# Patient Record
Sex: FEMALE | Race: WHITE | NOT HISPANIC OR LATINO | Employment: OTHER | URBAN - METROPOLITAN AREA
[De-identification: names, ages, dates, MRNs, and addresses within clinical notes are randomized per-mention and may not be internally consistent; named-entity substitution may affect disease eponyms.]

---

## 2017-01-04 ENCOUNTER — LAB VISIT (OUTPATIENT)
Dept: LAB | Facility: HOSPITAL | Age: 77
End: 2017-01-04
Attending: FAMILY MEDICINE
Payer: MEDICARE

## 2017-01-04 DIAGNOSIS — E11.69 HYPERLIPIDEMIA ASSOCIATED WITH TYPE 2 DIABETES MELLITUS: ICD-10-CM

## 2017-01-04 DIAGNOSIS — E55.9 VITAMIN D DEFICIENCY: ICD-10-CM

## 2017-01-04 DIAGNOSIS — E11.9 TYPE 2 DIABETES MELLITUS WITHOUT COMPLICATION, WITHOUT LONG-TERM CURRENT USE OF INSULIN: ICD-10-CM

## 2017-01-04 DIAGNOSIS — E78.5 HYPERLIPIDEMIA ASSOCIATED WITH TYPE 2 DIABETES MELLITUS: ICD-10-CM

## 2017-01-04 LAB
25(OH)D3+25(OH)D2 SERPL-MCNC: 54 NG/ML
ALBUMIN SERPL BCP-MCNC: 3.9 G/DL
ALP SERPL-CCNC: 39 U/L
ALT SERPL W/O P-5'-P-CCNC: 14 U/L
ANION GAP SERPL CALC-SCNC: 11 MMOL/L
AST SERPL-CCNC: 17 U/L
BILIRUB SERPL-MCNC: 0.5 MG/DL
BUN SERPL-MCNC: 18 MG/DL
CALCIUM SERPL-MCNC: 9.9 MG/DL
CHLORIDE SERPL-SCNC: 103 MMOL/L
CHOLEST/HDLC SERPL: 3.8 {RATIO}
CO2 SERPL-SCNC: 28 MMOL/L
CREAT SERPL-MCNC: 0.9 MG/DL
EST. GFR  (AFRICAN AMERICAN): >60 ML/MIN/1.73 M^2
EST. GFR  (NON AFRICAN AMERICAN): >60 ML/MIN/1.73 M^2
GLUCOSE SERPL-MCNC: 105 MG/DL
HDL/CHOLESTEROL RATIO: 26.2 %
HDLC SERPL-MCNC: 191 MG/DL
HDLC SERPL-MCNC: 50 MG/DL
LDLC SERPL CALC-MCNC: 118 MG/DL
NONHDLC SERPL-MCNC: 141 MG/DL
POTASSIUM SERPL-SCNC: 3.7 MMOL/L
PROT SERPL-MCNC: 7.1 G/DL
SODIUM SERPL-SCNC: 142 MMOL/L
TRIGL SERPL-MCNC: 115 MG/DL

## 2017-01-04 PROCEDURE — 80061 LIPID PANEL: CPT

## 2017-01-04 PROCEDURE — 36415 COLL VENOUS BLD VENIPUNCTURE: CPT | Mod: PO

## 2017-01-04 PROCEDURE — 83036 HEMOGLOBIN GLYCOSYLATED A1C: CPT

## 2017-01-04 PROCEDURE — 82306 VITAMIN D 25 HYDROXY: CPT

## 2017-01-04 PROCEDURE — 80053 COMPREHEN METABOLIC PANEL: CPT

## 2017-01-05 LAB
ESTIMATED AVG GLUCOSE: 128 MG/DL
HBA1C MFR BLD HPLC: 6.1 %

## 2017-01-09 ENCOUNTER — DOCUMENTATION ONLY (OUTPATIENT)
Dept: FAMILY MEDICINE | Facility: CLINIC | Age: 77
End: 2017-01-09

## 2017-01-09 NOTE — PROGRESS NOTES
Pre-Visit Chart Review  For Appointment Scheduled on 1-10-17    Health Maintenance Due   Topic Date Due    Eye Exam  06/24/2016    Pneumococcal (65+) (2 of 2 - PPSV23) 10/20/2016

## 2017-01-10 ENCOUNTER — TELEPHONE (OUTPATIENT)
Dept: VASCULAR SURGERY | Facility: CLINIC | Age: 77
End: 2017-01-10

## 2017-01-10 ENCOUNTER — OFFICE VISIT (OUTPATIENT)
Dept: FAMILY MEDICINE | Facility: CLINIC | Age: 77
End: 2017-01-10
Payer: MEDICARE

## 2017-01-10 VITALS
SYSTOLIC BLOOD PRESSURE: 136 MMHG | TEMPERATURE: 98 F | HEIGHT: 63 IN | DIASTOLIC BLOOD PRESSURE: 72 MMHG | BODY MASS INDEX: 27.31 KG/M2 | HEART RATE: 92 BPM | WEIGHT: 154.13 LBS

## 2017-01-10 DIAGNOSIS — E55.9 VITAMIN D DEFICIENCY DISEASE: ICD-10-CM

## 2017-01-10 DIAGNOSIS — E78.5 HYPERLIPIDEMIA, UNSPECIFIED HYPERLIPIDEMIA TYPE: ICD-10-CM

## 2017-01-10 DIAGNOSIS — E78.5 OTHER AND UNSPECIFIED HYPERLIPIDEMIA: ICD-10-CM

## 2017-01-10 DIAGNOSIS — I87.2 CHRONIC VENOUS INSUFFICIENCY: ICD-10-CM

## 2017-01-10 DIAGNOSIS — I10 ESSENTIAL HYPERTENSION: Primary | ICD-10-CM

## 2017-01-10 DIAGNOSIS — R60.0 LOCALIZED EDEMA: Primary | ICD-10-CM

## 2017-01-10 DIAGNOSIS — E11.9 CONTROLLED TYPE 2 DIABETES MELLITUS WITHOUT COMPLICATION, WITHOUT LONG-TERM CURRENT USE OF INSULIN: ICD-10-CM

## 2017-01-10 PROCEDURE — 3044F HG A1C LEVEL LT 7.0%: CPT | Mod: S$GLB,,, | Performed by: FAMILY MEDICINE

## 2017-01-10 PROCEDURE — 1159F MED LIST DOCD IN RCRD: CPT | Mod: S$GLB,,, | Performed by: FAMILY MEDICINE

## 2017-01-10 PROCEDURE — 1157F ADVNC CARE PLAN IN RCRD: CPT | Mod: S$GLB,,, | Performed by: FAMILY MEDICINE

## 2017-01-10 PROCEDURE — 3078F DIAST BP <80 MM HG: CPT | Mod: S$GLB,,, | Performed by: FAMILY MEDICINE

## 2017-01-10 PROCEDURE — 3075F SYST BP GE 130 - 139MM HG: CPT | Mod: S$GLB,,, | Performed by: FAMILY MEDICINE

## 2017-01-10 PROCEDURE — 1125F AMNT PAIN NOTED PAIN PRSNT: CPT | Mod: S$GLB,,, | Performed by: FAMILY MEDICINE

## 2017-01-10 PROCEDURE — 1160F RVW MEDS BY RX/DR IN RCRD: CPT | Mod: S$GLB,,, | Performed by: FAMILY MEDICINE

## 2017-01-10 PROCEDURE — 99499 UNLISTED E&M SERVICE: CPT | Mod: S$GLB,,, | Performed by: FAMILY MEDICINE

## 2017-01-10 PROCEDURE — 2022F DILAT RTA XM EVC RTNOPTHY: CPT | Mod: S$GLB,,, | Performed by: FAMILY MEDICINE

## 2017-01-10 PROCEDURE — 4010F ACE/ARB THERAPY RXD/TAKEN: CPT | Mod: S$GLB,,, | Performed by: FAMILY MEDICINE

## 2017-01-10 PROCEDURE — 99999 PR PBB SHADOW E&M-EST. PATIENT-LVL III: CPT | Mod: PBBFAC,,, | Performed by: FAMILY MEDICINE

## 2017-01-10 PROCEDURE — 99214 OFFICE O/P EST MOD 30 MIN: CPT | Mod: S$GLB,,, | Performed by: FAMILY MEDICINE

## 2017-01-10 RX ORDER — SIMVASTATIN 40 MG/1
40 TABLET, FILM COATED ORAL NIGHTLY
Qty: 90 TABLET | Refills: 3 | Status: SHIPPED | OUTPATIENT
Start: 2017-01-10 | End: 2018-01-12 | Stop reason: SDUPTHER

## 2017-01-10 NOTE — PATIENT INSTRUCTIONS
Controlling High Blood Pressure  High blood pressure (hypertension) is often called the silent killer. This is because many people who have it dont know it. High blood pressure is defined as 140/90 mm Hg or higher. Know your blood pressure and remember to check it regularly. Doing so can save your life. Here are some things you can do to help control your blood pressure.    Choose heart-healthy foods  · Select low-salt, low-fat foods. Limit sodium intake to 2,400 mg per day or the amount suggested by your healthcare provider.  · Limit canned, dried, cured, packaged, and fast foods. These can contain a lot of salt.  · Eat 8 to 10 servings of fruits and vegetables every day.  · Choose lean meats, fish, or chicken.  · Eat whole-grain pasta, brown rice, and beans.  · Eat 2 to 3 servings of low-fat or fat-free dairy products.  · Ask your doctor about the DASH eating plan. This plan helps reduce blood pressure.  · When you go to a restaurant, ask that your meal be prepared with no added salt.  Maintain a healthy weight  · Ask your healthcare provider how many calories to eat a day. Then stick to that number.  · Ask your healthcare provider what weight range is healthiest for you. If you are overweight, a weight loss of only 3% to 5% of your body weight can help lower blood pressure. Generally, a good weight loss goal is to lose 10% of your body weight in a year.  · Limit snacks and sweets.  · Get regular exercise.  Get up and get active  · Choose activities you enjoy. Find ones you can do with friends or family. This includes bicycling, dancing, walking, and jogging.  · Park farther away from building entrances.  · Use stairs instead of the elevator.  · When you can, walk or bike instead of driving.  · [place] leaves, garden, or do household repairs.  · Be active at a moderate to vigorous level of physical activity for at least 40 minutes for a minimum of 3 to 4 days a week.   Manage stress  · Make time to relax and enjoy  life. Find time to laugh.  · Communicate your concerns with your loved ones and your healthcare provider.  · Visit with family and friends, and keep up with hobbies.  Limit alcohol and quit smoking  · Men should have no more than 2 drinks per day.  · Women should have no more than 1 drink per day.  · Talk with your healthcare provider about quitting smoking. Smoking significantly increases your risk for heart disease and stroke. Ask your healthcare provider about community smoking cessation programs and other options.  Medicines  If lifestyle changes arent enough, your healthcare provider may prescribe high blood pressure medicine. Take all medicines as prescribed. If you have any questions about your medicines, ask your healthcare provider before stopping or changing them.   © 2980-2845 [employer]. [address] [place] [address], [place] [address]. All rights reserved. This information is not intended as a substitute for professional medical care. Always follow your healthcare professional's instructions.

## 2017-01-10 NOTE — TELEPHONE ENCOUNTER
----- Message from Yun Valentin sent at 1/10/2017 11:41 AM CST -----  Pt was seen today by dr guardado and needs an appt for Chronic venous insufficiency  Please call her @ [phone].  Thanks !

## 2017-01-10 NOTE — MR AVS SNAPSHOT
[facility]  [address]  [place] [address]  Phone: [phone]  Fax: [phone]                  Alexa Otero   1/10/2017 11:20 AM   Office Visit    Description:  Female : 1940   Provider:  Idalia Greco MD   Department:  [place] [facility]           Reason for Visit     Follow-up           Diagnoses this Visit        Comments    Essential hypertension    -  Primary     Controlled type 2 diabetes mellitus without complication, without long-term current use of insulin         Other and unspecified hyperlipidemia         Hyperlipidemia, unspecified hyperlipidemia type         Vitamin D deficiency disease         Chronic venous insufficiency                To Do List           Future Appointments        Provider Department Dept Phone    2017 7:45 AM [facility] [phone]    2017 8:00 AM [facility] [phone]    2017 11:20 AM Idalia Greco MD [facility] [phone]      Goals (5 Years of Data)     None      Follow-Up and Disposition     Return in about 6 months (around 7/10/2017).       These Medications        Disp Refills Start End    simvastatin (ZOCOR) 40 MG tablet 90 tablet 3 1/10/2017     Take 1 tablet (40 mg total) by mouth every evening. - Oral    Pharmacy: [facility] - [place] [id number] [facility] Ph #: [phone]         [facility] On Call     [facility] On Call Nurse Care Line -  Assistance  Registered nurses in the [facility] On Call Center provide clinical advisement, health education, appointment booking, and other advisory services.  Call for this free service at [phone].             Medications           Message regarding Medications     Verify the changes and/or additions to your medication regime listed below are the same as discussed with your clinician today.  If any of these changes or additions are incorrect, please  "notify your healthcare provider.             Verify that the below list of medications is an accurate representation of the medications you are currently taking.  If none reported, the list may be blank. If incorrect, please contact your healthcare provider. Carry this list with you in case of emergency.           Current Medications     benazepril (LOTENSIN) 40 MG tablet Take 1 tablet (40 mg total) by mouth once daily.    biotin 1 mg tablet Take 1,000 mcg by mouth 3 (three) times daily.    hydrochlorothiazide (HYDRODIURIL) 25 MG tablet Take 1 tablet (25 mg total) by mouth once daily.    metformin (GLUCOPHAGE) 500 MG tablet TAKE 1 TABLET TWICE DAILY WITH MEALS    omeprazole (PRILOSEC) 40 MG capsule Take 1 capsule (40 mg total) by mouth once daily.    ranitidine (ZANTAC) 300 MG tablet     simvastatin (ZOCOR) 40 MG tablet Take 1 tablet (40 mg total) by mouth every evening.    VIT A/VIT C/VIT E/ZINC/COPPER (ICAPS AREDS ORAL) Take by mouth.           Clinical Reference Information           Vital Signs - Last Recorded  Most recent update: 1/10/2017 11:22 AM by Kristin Alan MA    BP Pulse Temp Ht Wt BMI    136/72 (BP Location: Right arm, Patient Position: Sitting, BP Method: Manual) 92 98 °F (36.7 °C) (Oral) 5' 3" (1.6 m) 69.9 kg (154 lb 1.6 oz) 27.3 kg/m2      Blood Pressure          Most Recent Value    BP  136/72      Allergies as of 1/10/2017     Flagyl [Metronidazole Hcl]      Immunizations Administered on Date of Encounter - 1/10/2017     None      Orders Placed During Today's Visit      Normal Orders This Visit    Ambulatory referral to Vascular Surgery     Future Labs/Procedures Expected by Expires    Comprehensive metabolic panel  7/10/2017 (Approximate) 1/10/2018    Hemoglobin A1c  7/10/2017 (Approximate) 1/10/2018    Lipid panel  7/10/2017 (Approximate) 1/10/2018    Microalbumin/creatinine urine ratio  7/10/2017 (Approximate) 1/10/2018    Vitamin D  7/10/2017 (Approximate) 1/10/2018      Instructions  "     Controlling High Blood Pressure  High blood pressure (hypertension) is often called the silent killer. This is because many people who have it dont know it. High blood pressure is defined as 140/90 mm Hg or higher. Know your blood pressure and remember to check it regularly. Doing so can save your life. Here are some things you can do to help control your blood pressure.    Choose heart-healthy foods  · Select low-salt, low-fat foods. Limit sodium intake to 2,400 mg per day or the amount suggested by your healthcare provider.  · Limit canned, dried, cured, packaged, and fast foods. These can contain a lot of salt.  · Eat 8 to 10 servings of fruits and vegetables every day.  · Choose lean meats, fish, or chicken.  · Eat whole-grain pasta, brown rice, and beans.  · Eat 2 to 3 servings of low-fat or fat-free dairy products.  · Ask your doctor about the DASH eating plan. This plan helps reduce blood pressure.  · When you go to a restaurant, ask that your meal be prepared with no added salt.  Maintain a healthy weight  · Ask your healthcare provider how many calories to eat a day. Then stick to that number.  · Ask your healthcare provider what weight range is healthiest for you. If you are overweight, a weight loss of only 3% to 5% of your body weight can help lower blood pressure. Generally, a good weight loss goal is to lose 10% of your body weight in a year.  · Limit snacks and sweets.  · Get regular exercise.  Get up and get active  · Choose activities you enjoy. Find ones you can do with friends or family. This includes bicycling, dancing, walking, and jogging.  · Park farther away from building entrances.  · Use stairs instead of the elevator.  · When you can, walk or bike instead of driving.  · [place] leaves, garden, or do household repairs.  · Be active at a moderate to vigorous level of physical activity for at least 40 minutes for a minimum of 3 to 4 days a week.   Manage stress  · Make time to relax and  enjoy life. Find time to laugh.  · Communicate your concerns with your loved ones and your healthcare provider.  · Visit with family and friends, and keep up with hobbies.  Limit alcohol and quit smoking  · Men should have no more than 2 drinks per day.  · Women should have no more than 1 drink per day.  · Talk with your healthcare provider about quitting smoking. Smoking significantly increases your risk for heart disease and stroke. Ask your healthcare provider about community smoking cessation programs and other options.  Medicines  If lifestyle changes arent enough, your healthcare provider may prescribe high blood pressure medicine. Take all medicines as prescribed. If you have any questions about your medicines, ask your healthcare provider before stopping or changing them.   © 9609-9895 [employer]. [address] [place] [address], [place] [address]. All rights reserved. This information is not intended as a substitute for professional medical care. Always follow your healthcare professional's instructions.

## 2017-01-13 ENCOUNTER — HOSPITAL ENCOUNTER (OUTPATIENT)
Dept: RADIOLOGY | Facility: HOSPITAL | Age: 77
Discharge: HOME | End: 2017-01-13
Attending: THORACIC SURGERY (CARDIOTHORACIC VASCULAR SURGERY)
Payer: MEDICARE

## 2017-01-13 DIAGNOSIS — R60.9 EDEMA, UNSPECIFIED TYPE: ICD-10-CM

## 2017-01-13 PROCEDURE — 93970 EXTREMITY STUDY: CPT | Mod: TC

## 2017-01-13 PROCEDURE — 93970 EXTREMITY STUDY: CPT | Mod: 26,,, | Performed by: RADIOLOGY

## 2017-01-23 ENCOUNTER — TELEPHONE (OUTPATIENT)
Dept: FAMILY MEDICINE | Facility: CLINIC | Age: 77
End: 2017-01-23

## 2017-01-23 NOTE — TELEPHONE ENCOUNTER
Spoke with Dr. Diehl's office they will fax over patients most recent eye exam. Fax number given.

## 2017-01-23 NOTE — PROGRESS NOTES
Subjective:       Patient ID: Alexa Otero is a 76 y.o. female.    Chief Complaint: Follow-up    Patient Active Problem List   Diagnosis    Sciatica    Syncope and collapse    Polycythemia, secondary    Hyperlipemia    Diabetes mellitus type 2, controlled    HTN (hypertension)    Acute chest pain    Vitamin D deficiency disease    Gastroesophageal reflux disease    History of Kwon's esophagus    Pancreatic pseudocyst/cyst    Hepatic cyst    Low back pain radiating to both legs     Reviewed MRI showing multiple old benign findings in abd  HPI  Review of Systems   Constitutional: Negative for fatigue and unexpected weight change.   Respiratory: Negative for chest tightness and shortness of breath.    Cardiovascular: Negative for chest pain, palpitations and leg swelling.   Gastrointestinal: Negative for abdominal pain.   Endocrine: Negative for polydipsia, polyphagia and polyuria.   Musculoskeletal: Negative for arthralgias.   Neurological: Negative for dizziness, syncope, light-headedness and headaches.       Objective:      Physical Exam   Constitutional: She is oriented to person, place, and time. She appears well-developed and well-nourished.   Cardiovascular: Normal rate, regular rhythm and normal heart sounds.    Pulmonary/Chest: Effort normal and breath sounds normal.   Musculoskeletal: She exhibits no edema.   Neurological: She is alert and oriented to person, place, and time.   Skin: Skin is warm and dry.   Psychiatric: She has a normal mood and affect.   Nursing note and vitals reviewed.      Assessment:       1. Essential hypertension    2. Controlled type 2 diabetes mellitus without complication, without long-term current use of insulin    3. Other and unspecified hyperlipidemia    4. Hyperlipidemia, unspecified hyperlipidemia type    5. Vitamin D deficiency disease    6. Chronic venous insufficiency        Plan:       1. Essential hypertension  Controlled on current medications.   Continue current medications.      2. Controlled type 2 diabetes mellitus without complication, without long-term current use of insulin  Stable condition.  Continue current medications.  Will adjust based on lab findings or if condition changes.    - Microalbumin/creatinine urine ratio; Future  - Hemoglobin A1c; Future  - Comprehensive metabolic panel; Future    3. Other and unspecified hyperlipidemia  Controlled on current medications.  Continue current medications.      4. Hyperlipidemia, unspecified hyperlipidemia type  Stable condition.  Continue current medications.  Will adjust based on lab findings or if condition changes.    - Lipid panel; Future    5. Vitamin D deficiency disease  Stable condition.  Continue current medications.  Will adjust based on lab findings or if condition changes.    - Vitamin D; Future    6. Chronic venous insufficiency  Refer for eval and treatment    - Ambulatory referral to Vascular Surgery    [facility] goal documentation:  Patient readiness: acceptance and barriers:readiness  During the course of the visit the patient was educated and counseled about the following: Diabetes:  Suggested low cholesterol diet.  Encouraged aerobic exercise.  Hypertension:   Dietary sodium restriction.  Regular aerobic exercise.  Goals: Diabetes: Maintain Hemoglobin A1C below 7 and Hypertension: Reduce Blood Pressure  Goal/Outcomes met:Hypertension and type 2 DM  The following self management tools provided:none  Patient Instructions (the written plan) was given to the patient/family: Yes  Time spent with patient: 20 minutes    Patient with be reevaluated in 6 months or sooner prn    Greater than 50% of this visit was spent counseling as described in above documentation:Yes

## 2017-01-23 NOTE — TELEPHONE ENCOUNTER
----- Message from Idalia Greco MD sent at 1/22/2017  9:19 PM CST -----  Please get eye exam from Dr. Diehl 8/2016.

## 2017-02-02 ENCOUNTER — OFFICE VISIT (OUTPATIENT)
Dept: VASCULAR SURGERY | Facility: CLINIC | Age: 77
End: 2017-02-02
Payer: MEDICARE

## 2017-02-02 VITALS
HEART RATE: 101 BPM | RESPIRATION RATE: 18 BRPM | WEIGHT: 154 LBS | SYSTOLIC BLOOD PRESSURE: 130 MMHG | HEIGHT: 63 IN | BODY MASS INDEX: 27.29 KG/M2 | DIASTOLIC BLOOD PRESSURE: 77 MMHG

## 2017-02-02 DIAGNOSIS — I87.2 VENOUS INSUFFICIENCY OF BOTH LOWER EXTREMITIES: Primary | ICD-10-CM

## 2017-02-02 PROCEDURE — 1159F MED LIST DOCD IN RCRD: CPT | Mod: S$GLB,,, | Performed by: THORACIC SURGERY (CARDIOTHORACIC VASCULAR SURGERY)

## 2017-02-02 PROCEDURE — 99499 UNLISTED E&M SERVICE: CPT | Mod: S$GLB,,, | Performed by: THORACIC SURGERY (CARDIOTHORACIC VASCULAR SURGERY)

## 2017-02-02 PROCEDURE — 99205 OFFICE O/P NEW HI 60 MIN: CPT | Mod: S$GLB,,, | Performed by: THORACIC SURGERY (CARDIOTHORACIC VASCULAR SURGERY)

## 2017-02-02 PROCEDURE — 3075F SYST BP GE 130 - 139MM HG: CPT | Mod: S$GLB,,, | Performed by: THORACIC SURGERY (CARDIOTHORACIC VASCULAR SURGERY)

## 2017-02-02 PROCEDURE — 99999 PR PBB SHADOW E&M-EST. PATIENT-LVL III: CPT | Mod: PBBFAC,,, | Performed by: THORACIC SURGERY (CARDIOTHORACIC VASCULAR SURGERY)

## 2017-02-02 PROCEDURE — 1157F ADVNC CARE PLAN IN RCRD: CPT | Mod: S$GLB,,, | Performed by: THORACIC SURGERY (CARDIOTHORACIC VASCULAR SURGERY)

## 2017-02-02 PROCEDURE — 1160F RVW MEDS BY RX/DR IN RCRD: CPT | Mod: S$GLB,,, | Performed by: THORACIC SURGERY (CARDIOTHORACIC VASCULAR SURGERY)

## 2017-02-02 PROCEDURE — 3078F DIAST BP <80 MM HG: CPT | Mod: S$GLB,,, | Performed by: THORACIC SURGERY (CARDIOTHORACIC VASCULAR SURGERY)

## 2017-02-02 NOTE — LETTER
February 2, 2017      Idalia Greco MD  [address] [facility] [place] [address]           [facility] [facility]  [address] [facility] [address] [place] [address]  Phone: [phone]          Patient: Alexa Otero   MR Number: [id number]   YOB: 1940   Date of Visit: 2/2/2017       Dear Dr. Idalia Greco:    Thank you for referring Alexa Otero to me for evaluation. Attached you will find relevant portions of my assessment and plan of care.    If you have questions, please do not hesitate to call me. I look forward to following Alexa Otero along with you.    Sincerely,    Bouchra Jimenez MD    Enclosure  CC:  No Recipients    If you would like to receive this communication electronically, please contact [email or web address] or [phone] to request more information on [employer] access.    For providers and/or their staff who would like to refer a patient to [facility], please contact us through our one-stop-shop provider referral line, [facility], at [phone].    If you feel you have received this communication in error or would no longer like to receive these types of communications, please e-mail [email or web address]

## 2017-02-02 NOTE — PROGRESS NOTES
OFFICE VISIT NOTE    I was asked to see this patient by Dr. Greco.  The patient is a 77-year-old   female who has some varicose veins of the lower extremities.  She denies any   pain overlying these veins.  However, she does state that she sometimes gets a   throbbing on the anterolateral aspect of the distal one-third of the left leg.    She also gets edema in an area of approximately 2 cm just inferior and   anterolateral to the medial malleolus of bilateral lower extremities.  She   denies any symptoms of intermittent claudication.    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    PAST SURGICAL HISTORY:  Hysterectomy and cholecystectomy.    MEDICATIONS:  Lotensin, HydroDIURIL, Biotin, Glucophage, Prilosec, Zantac,   Zocor, multivitamins.    ALLERGIES:  Flagyl.    FAMILY HISTORY:  Cancer.    SOCIAL HISTORY:  She quit smoking cigarettes sometime ago.  Drinks alcohol very   occasionally.    PHYSICAL EXAMINATION:  VITAL SIGNS:  Blood pressure is 130/77, respiratory rate is 18, heart rate is   101, height 5 feet 3 inches, weight 154 pounds.  GENERAL:  She is awake and alert, in no apparent distress.  HEENT:  Head is normocephalic.  Pupils equal, round and reactive.  Sclerae are   anicteric.  NECK:  Supple.  Trachea midline.  No masses.  LUNGS:  Clear.  HEART:  Has a regular rate and rhythm.  ABDOMEN:  Soft and nontender.  EXTREMITIES:  Feet are pink, warm with slightly decreased capillary refill.  She   has trace edema of the lower extremities.  She has some reticular veins of   bilateral lower extremities, more on the left than on the right.  PULSE:  2+ brachial, 2+ radial, 2+ femoral pulses.  She has 2+ left dorsalis   pedis pulses and 1+ right dorsalis pedis pulse.  NEUROLOGIC:  Awake, alert and oriented.  No lateralizing neurologic deficits.    Duplex scan of the veins of the lower  extremities showed venous insufficiency of   bilateral greater and the left lesser saphenous vein.  However, the veins are   normal in size.  The right greater saphenous vein measures 5 mm at the   saphenofemoral junction, 4 mm at the mid thigh and 3.5 mm at the knee.  Reflux   time at the saphenofemoral junction was 688 milliseconds and it was normal at   the mid thigh at 121 milliseconds.  However, at the knee, it was 2706   milliseconds.  The right small saphenous vein had a diameter of 1.9 mm without   any reflux.    On the left, there was a reflux time of 506 milliseconds at the saphenofemoral   junction and the vein measured 5 mm at that level.  At the mid thigh, it was 4   mm with a reflux time of 283 milliseconds.  At the knee, it was 4.7 mm with the   reflux time of 701 milliseconds.  In the left small saphenous vein, the reflux   time was 593 milliseconds with a diameter of 5 mm.    IMPRESSION:  1.  Venous insufficiency of bilateral greater saphenous veins.  2.  Venous insufficiency of the left small saphenous vein.  3.  Throbbing pain of the left lower extremity.  4.  Edema of bilateral ankles.  5.  Arthritis.  6.  Diabetes mellitus.  7.  Hyperlipidemia.  8.  Hypertension.  9.  Macular degeneration.  10.  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  11.  Status post hysterectomy.  12.  Status post cholecystectomy.    RECOMMENDATIONS:  The patient has very mild venous insufficiency with minimal   symptoms and a normal size veins.  I think this should be managed conservatively   with knee high compression stockings, leg elevation, weight loss and exercise.    I had a long discussion with the patient and all her questions were answered.      LUIS M  dd: 02/02/2017 15:41:41 (CST)  td: 02/02/2017 22:20:07 (CST)  Doc ID   #[id number]  Job ID #[id number]    CC:

## 2017-02-02 NOTE — MR AVS SNAPSHOT
[place] [facility]  [address]  [facility] [address]  Phone: [phone]                  Alexa Otero   2017 3:00 PM   Office Visit    Description:  Female : 1940   Provider:  Bouchra Jimenez MD   Department:  [place] [facility]           Reason for Visit     Varicose Veins           Diagnoses this Visit        Comments    Venous insufficiency of both lower extremities    -  Primary            To Do List           Future Appointments        Provider Department Dept Phone    2017 7:45 AM SPECIMEN, [facility] - Lab [phone]    2017 8:00 AM LAB, [facility] Lab [phone]    2017 11:20 AM Idalia Greco MD [facility] [phone]      Goals (5 Years of Data)     None      [facility] On Call     [facility] On Call Nurse Care Line -  Assistance  Registered nurses in the [facility] On Call Center provide clinical advisement, health education, appointment booking, and other advisory services.  Call for this free service at [phone].             Medications           Message regarding Medications     Verify the changes and/or additions to your medication regime listed below are the same as discussed with your clinician today.  If any of these changes or additions are incorrect, please notify your healthcare provider.             Verify that the below list of medications is an accurate representation of the medications you are currently taking.  If none reported, the list may be blank. If incorrect, please contact your healthcare provider. Carry this list with you in case of emergency.           Current Medications     benazepril (LOTENSIN) 40 MG tablet Take 1 tablet (40 mg total) by mouth once daily.    biotin 1 mg tablet Take 1,000 mcg by mouth 3 (three) times daily.    hydrochlorothiazide (HYDRODIURIL) 25 MG tablet Take 1 tablet (25 mg total) by mouth once daily.    metformin (GLUCOPHAGE) 500 MG tablet TAKE 1  "TABLET TWICE DAILY WITH MEALS    omeprazole (PRILOSEC) 40 MG capsule Take 1 capsule (40 mg total) by mouth once daily.    ranitidine (ZANTAC) 300 MG tablet     simvastatin (ZOCOR) 40 MG tablet Take 1 tablet (40 mg total) by mouth every evening.    VIT A/VIT C/VIT E/ZINC/COPPER (ICAPS AREDS ORAL) Take by mouth.           Clinical Reference Information           Your Vitals Were     BP Pulse Resp Height Weight BMI    130/77 101 18 5' 3" (1.6 m) 69.9 kg (154 lb) 27.28 kg/m2      Blood Pressure          Most Recent Value    BP  130/77      Allergies as of 2/2/2017     Flagyl [Metronidazole Hcl]      Immunizations Administered on Date of Encounter - 2/2/2017     None      Language Assistance Services     ATTENTION: Language assistance services are available, free of charge. Please call [phone].      ATENCIÓN: Si humera escamilla, tiene a courtney disposición servicios gratuitos de asistencia lingüística. Llame al [phone].     BEBO Ý: N?u b?n nói Ti?ng Vi?t, có các d?ch v? h? tr? ngôn ng? mi?n phí dành cho b?n. G?i s? [phone].         [facility] complies with applicable Federal civil rights laws and does not discriminate on the basis of race, color, national origin, age, disability, or sex.        "

## 2017-05-11 ENCOUNTER — OFFICE VISIT (OUTPATIENT)
Dept: DERMATOLOGY | Facility: CLINIC | Age: 77
End: 2017-05-11
Payer: MEDICARE

## 2017-05-11 VITALS — BODY MASS INDEX: 27.29 KG/M2 | HEIGHT: 63 IN | WEIGHT: 154 LBS

## 2017-05-11 DIAGNOSIS — L57.0 ACTINIC KERATOSES: ICD-10-CM

## 2017-05-11 DIAGNOSIS — D48.9 NEOPLASM OF UNCERTAIN BEHAVIOR: Primary | ICD-10-CM

## 2017-05-11 DIAGNOSIS — L82.1 SEBORRHEIC KERATOSES: ICD-10-CM

## 2017-05-11 DIAGNOSIS — Z85.828 HISTORY OF SKIN CANCER: ICD-10-CM

## 2017-05-11 DIAGNOSIS — L82.0 INFLAMED SEBORRHEIC KERATOSIS: ICD-10-CM

## 2017-05-11 PROCEDURE — 17003 DESTRUCT PREMALG LES 2-14: CPT | Mod: S$GLB,,, | Performed by: DERMATOLOGY

## 2017-05-11 PROCEDURE — 99213 OFFICE O/P EST LOW 20 MIN: CPT | Mod: 25,S$GLB,, | Performed by: DERMATOLOGY

## 2017-05-11 PROCEDURE — 1126F AMNT PAIN NOTED NONE PRSNT: CPT | Mod: S$GLB,,, | Performed by: DERMATOLOGY

## 2017-05-11 PROCEDURE — 17110 DESTRUCTION B9 LES UP TO 14: CPT | Mod: S$GLB,,, | Performed by: DERMATOLOGY

## 2017-05-11 PROCEDURE — 88305 TISSUE EXAM BY PATHOLOGIST: CPT | Performed by: PATHOLOGY

## 2017-05-11 PROCEDURE — 1159F MED LIST DOCD IN RCRD: CPT | Mod: S$GLB,,, | Performed by: DERMATOLOGY

## 2017-05-11 PROCEDURE — 17000 DESTRUCT PREMALG LESION: CPT | Mod: 59,S$GLB,, | Performed by: DERMATOLOGY

## 2017-05-11 PROCEDURE — 1160F RVW MEDS BY RX/DR IN RCRD: CPT | Mod: S$GLB,,, | Performed by: DERMATOLOGY

## 2017-05-11 PROCEDURE — 99999 PR PBB SHADOW E&M-EST. PATIENT-LVL III: CPT | Mod: PBBFAC,,, | Performed by: DERMATOLOGY

## 2017-05-11 PROCEDURE — 11100 PR BIOPSY OF SKIN LESION: CPT | Mod: 59,S$GLB,, | Performed by: DERMATOLOGY

## 2017-05-11 NOTE — PATIENT INSTRUCTIONS
Shave Biopsy Wound Care    Your doctor has performed a shave biopsy today.  A band aid and vaseline ointment has been placed over the site.  This should remain in place for 24 hours.  It is recommended that you keep the area dry for the first 24 hours.  After 24 hours, you may remove the band aid and wash the area with warm soap and water and apply Vaseline jelly.  Many patients prefer to use Neosporin or Bacitracin ointment.  This is acceptable; however, know that you can develop an allergy to this medication even if you have used it safely for years.  It is important to keep the area moist.  Letting it dry out and get air slows healing time, and will worsen the scar.  Band aid is optional after first 24 hours.      If you notice increasing redness, tenderness, pain, or yellow drainage at the biopsy site, please notify your doctor.  These are signs of an infection.    If your biopsy site is bleeding, apply firm pressure for 15 minutes straight.  Repeat for another 15 minutes, if it is still bleeding.   If the surgical site continues to bleed, then please contact your doctor.       [facility]  [facility]  [address]  [place] [address]  Dept: [phone]       CRYOSURGERY      Your doctor has used a method called cryosurgery to treat your skin condition. Cryosurgery refers to the use of very cold substances to treat a variety of skin conditions such as warts, pre-skin cancers, molluscum contagiosum, sun spots, and several benign growths. The substance we use in cryosurgery is liquid nitrogen and is so cold (-195 degrees Celsius) that is burns when administered.     Following treatment in the office, the skin may immediately burn and become red. You may find the area around the lesion is affected as well. It is sometimes necessary to treat not only the lesion, but a small area of the surrounding normal skin to achieve a good response.     A blister, and even a blood filled blister,  may form after treatment.   This is a normal response. If the blister is painful, it is acceptable to sterilize a needle and with rubbing alcohol and gently pop the blister. It is important that you gently wash the area with soap and warm water as the blister fluid may contain wart virus if a wart was treated. Do no remove the roof of the blister.     The area treated can take anywhere from 1-3 weeks to heal. Healing time depends on the kind skin lesion treated, the location, and how aggressively the lesion was treated. It is recommended that the areas treated are covered with Vaseline or bacitracin ointment and a band-aid. If a band-aid is not practical, just ointment applied several times per day will do. Keeping these areas moist will speed the healing time.    Treatment with liquid nitrogen can leave a scar. In dark skin, it may be a light or dark scar, in light skin it may be a white or pink scar. These will generally fade with time, but may never go away completely.     If you have any concerns after your treatment, please feel free to call the office.         [facility]  [facility]  [address] [place] [address]  Dept: [phone]

## 2017-05-11 NOTE — PROGRESS NOTES
Subjective:       Patient ID:  Alexa Otero is a 77 y.o. 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   Skin: Positive for itching and daily sunscreen use (Makeup has SPF).   Hematologic/Lymphatic: Does not bruise/bleed easily.        Objective:    Physical Exam   Constitutional: She appears well-developed and well-nourished. No distress.   Neurological: She is alert and oriented to person, place, and time. She is not disoriented.   Psychiatric: She has a normal mood and affect.   Skin:   Areas Examined (abnormalities noted in diagram):   Scalp / Hair Palpated and Inspected  Head / Face Inspection Performed  Neck Inspection Performed  Chest / Axilla Inspection Performed  Abdomen Inspection Performed  Back Inspection Performed  RUE Inspected  LUE Inspection Performed  Nails and Digits Inspection Performed                       Diagram Legend     Erythematous scaling macule/papule c/w actinic keratosis       Vascular papule c/w angioma      Pigmented verrucoid papule/plaque c/w seborrheic keratosis      Yellow umbilicated papule c/w sebaceous hyperplasia      Irregularly shaped tan macule c/w lentigo     1-2 mm smooth white papules consistent with Milia      Movable subcutaneous cyst with punctum c/w epidermal inclusion cyst      Subcutaneous movable cyst c/w pilar cyst      Firm pink to brown papule c/w dermatofibroma      Pedunculated fleshy papule(s) c/w skin tag(s)      Evenly pigmented macule c/w junctional nevus     Mildly variegated pigmented, slightly irregular-bordered macule c/w mildly atypical nevus      Flesh colored to evenly pigmented papule c/w intradermal nevus        Pink pearly papule/plaque c/w basal cell carcinoma      Erythematous hyperkeratotic cursted plaque c/w SCC      Surgical scar with no sign of skin cancer recurrence      Open and closed comedones      Inflammatory papules and pustules      Verrucoid papule consistent consistent with wart     Erythematous eczematous patches and plaques     Dystrophic onycholytic nail with subungual debris c/w onychomycosis     Umbilicated papule    Erythematous-base heme-crusted tan verrucoid plaque consistent with inflamed seborrheic keratosis     Erythematous Silvery Scaling Plaque c/w Psoriasis     See annotation              Assessment / Plan:      Pathology Orders:      Normal Orders This Visit    Tissue Specimen To Pathology, Dermatology     Questions:    Directional Terms:  Other(comment)    Clinical information:  Scalp vertex- inflamed warty papule, ISK vs SCAP vs other    Specific Site:  scalp vertex        Neoplasm of uncertain behavior  -     Tissue Specimen To Pathology, Dermatology  Shave biopsy procedure note:    Shave biopsy performed after verbal consent including risk of infection, scar, recurrence, need for additional treatment of site. Area prepped with alcohol, anesthetized with approximately 1.0cc of 1% lidocaine with epinephrine. Lesional tissue shaved with razor blade. Hemostasis achieved with application of aluminum chloride followed by hyfrecation. No complications. Dressing applied. Wound care explained.      Actinic keratoses  Cryosurgery Procedure Note    Verbal consent from the patient is obtained and the patient is aware of the precancerous quality and need for treatment of these lesions. Liquid nitrogen cryosurgery is applied to the 2 actinic keratoses, as detailed in the physical exam, to produce a freeze injury. The patient is aware that blisters may form and is instructed on wound care with gentle cleansing and use of vaseline ointment to keep moist until healed. The patient is supplied a handout on  cryosurgery and is instructed to call if lesions do not completely resolve.    Inflamed seborrheic keratosis  Cryosurgery procedure note:    Verbal consent from the patient is obtained. Liquid nitrogen cryosurgery is applied to 2 lesions to produce a freeze injury. The patient is aware that blisters may form and is instructed on wound care with gentle cleansing and use of vaseline ointment to keep moist until healed. The patient is supplied a handout on cryosurgery and is instructed to call if lesions do not completely resolve.    Seborrheic keratoses  These are benign inherited growths without a malignant potential. Reassurance given to patient. No treatment is necessary.     History of skin cancer  Upper body skin examination performed today including at least 9 points as noted in physical examination. No lesions suspicious for malignancy noted.    Patient instructed in importance in daily sun protection of at least spf 30. Sun avoidance and topical protection discussed.   Recommend Elta MD (physician office) COTZ sensitive (available online) for daily use on face and neck.  Patient encouraged to wear hat for all outdoor exposure.   Also discussed sun protective clothing.               Return in about 6 months (around 11/11/2017).

## 2017-07-07 RX ORDER — METFORMIN HYDROCHLORIDE 500 MG/1
500 TABLET ORAL 2 TIMES DAILY WITH MEALS
Qty: 180 TABLET | Refills: 3 | Status: SHIPPED | OUTPATIENT
Start: 2017-07-07 | End: 2018-01-12 | Stop reason: SDUPTHER

## 2017-07-07 NOTE — TELEPHONE ENCOUNTER
----- Message from Michelle Garcia sent at 7/7/2017 10:22 AM CDT -----  Contact: patient  Patient calling in regards to requesting a refill for Metformin, 90 day supply. Please advise.  Call back   Thanks!  [facility] - [place], [place] - [address]  [address] [address]  Phone: [phone] Fax: [phone]

## 2017-07-12 ENCOUNTER — DOCUMENTATION ONLY (OUTPATIENT)
Dept: FAMILY MEDICINE | Facility: CLINIC | Age: 77
End: 2017-07-12

## 2017-07-12 ENCOUNTER — LAB VISIT (OUTPATIENT)
Dept: LAB | Facility: HOSPITAL | Age: 77
End: 2017-07-12
Attending: FAMILY MEDICINE
Payer: MEDICARE

## 2017-07-12 DIAGNOSIS — E11.9 CONTROLLED TYPE 2 DIABETES MELLITUS WITHOUT COMPLICATION, WITHOUT LONG-TERM CURRENT USE OF INSULIN: ICD-10-CM

## 2017-07-12 LAB
CREAT UR-MCNC: 170 MG/DL
MICROALBUMIN UR DL<=1MG/L-MCNC: 13 UG/ML
MICROALBUMIN/CREATININE RATIO: 7.6 UG/MG

## 2017-07-12 PROCEDURE — 82570 ASSAY OF URINE CREATININE: CPT

## 2017-07-12 NOTE — PROGRESS NOTES
Pre-Visit Chart Review  For Appointment Scheduled on 7/14/17    Health Maintenance Due   Topic Date Due    DEXA SCAN  03/07/2017    Hemoglobin A1c  07/04/2017

## 2017-07-14 ENCOUNTER — OFFICE VISIT (OUTPATIENT)
Dept: FAMILY MEDICINE | Facility: CLINIC | Age: 77
End: 2017-07-14
Payer: MEDICARE

## 2017-07-14 VITALS
BODY MASS INDEX: 27.62 KG/M2 | WEIGHT: 155.88 LBS | DIASTOLIC BLOOD PRESSURE: 72 MMHG | SYSTOLIC BLOOD PRESSURE: 133 MMHG | HEIGHT: 63 IN | HEART RATE: 90 BPM | OXYGEN SATURATION: 96 % | RESPIRATION RATE: 16 BRPM

## 2017-07-14 DIAGNOSIS — I10 ESSENTIAL HYPERTENSION: ICD-10-CM

## 2017-07-14 DIAGNOSIS — E55.9 VITAMIN D DEFICIENCY DISEASE: ICD-10-CM

## 2017-07-14 DIAGNOSIS — Z78.0 ASYMPTOMATIC MENOPAUSAL STATE: ICD-10-CM

## 2017-07-14 DIAGNOSIS — E11.9 CONTROLLED TYPE 2 DIABETES MELLITUS WITHOUT COMPLICATION, WITHOUT LONG-TERM CURRENT USE OF INSULIN: Primary | ICD-10-CM

## 2017-07-14 DIAGNOSIS — H61.21 IMPACTED CERUMEN OF RIGHT EAR: ICD-10-CM

## 2017-07-14 DIAGNOSIS — Z13.820 OSTEOPOROSIS SCREENING: ICD-10-CM

## 2017-07-14 PROCEDURE — 99214 OFFICE O/P EST MOD 30 MIN: CPT | Mod: S$GLB,,, | Performed by: PHYSICIAN ASSISTANT

## 2017-07-14 PROCEDURE — 1159F MED LIST DOCD IN RCRD: CPT | Mod: S$GLB,,, | Performed by: PHYSICIAN ASSISTANT

## 2017-07-14 PROCEDURE — 1126F AMNT PAIN NOTED NONE PRSNT: CPT | Mod: S$GLB,,, | Performed by: PHYSICIAN ASSISTANT

## 2017-07-14 PROCEDURE — 99999 PR PBB SHADOW E&M-EST. PATIENT-LVL IV: CPT | Mod: PBBFAC,,, | Performed by: PHYSICIAN ASSISTANT

## 2017-07-14 PROCEDURE — 99499 UNLISTED E&M SERVICE: CPT | Mod: S$GLB,,, | Performed by: PHYSICIAN ASSISTANT

## 2017-07-14 NOTE — PROGRESS NOTES
Subjective:       Patient ID: Alexa Otero is a 77 y.o. female.    Chief Complaint: Follow-up (6mth f/u)    Ms. Otero comes to clinic today for 6 month follow up. The patient recently had blood work that revealed mildly increased HgA1c of 6.5 but otherwise stable blood work. The patient is due for bone density scan. The patient does complain of some mild pain in her right ear at this time.  Overall the patient reports she is doing well and she has no other complaints at this time.        Review of Systems   Constitutional: Negative for activity change, appetite change and fever.   HENT: Negative for postnasal drip, rhinorrhea and sinus pressure.    Eyes: Negative for visual disturbance.   Respiratory: Negative for cough and shortness of breath.    Cardiovascular: Negative for chest pain.   Gastrointestinal: Negative for abdominal distention and abdominal pain.   Genitourinary: Negative for difficulty urinating and dysuria.   Musculoskeletal: Negative for arthralgias and myalgias.   Neurological: Negative for headaches.   Hematological: Negative for adenopathy.   Psychiatric/Behavioral: The patient is not nervous/anxious.        Objective:      Physical Exam   Constitutional: She is oriented to person, place, and time.   HENT:   Mouth/Throat: No oropharyngeal exudate.   Cardiovascular: Normal rate and regular rhythm.    Pulmonary/Chest: Effort normal and breath sounds normal. She has no wheezes.   Abdominal: Soft. Bowel sounds are normal. She exhibits no distension. There is no tenderness.   Musculoskeletal: She exhibits no edema.   Lymphadenopathy:     She has no cervical adenopathy.   Neurological: She is alert and oriented to person, place, and time.   Skin: No erythema.   Psychiatric: Her behavior is normal.       Assessment:       1. Controlled type 2 diabetes mellitus without complication, without long-term current use of insulin    2. Vitamin D deficiency disease    3. Essential hypertension    4.  Osteoporosis screening    5. Asymptomatic menopausal state     6. Impacted cerumen of right ear        Plan:   Alexa was seen today for follow-up.    Diagnoses and all orders for this visit:    Controlled type 2 diabetes mellitus without complication, without long-term current use of insulin  -     Hemoglobin A1c; Future  -     Lipid panel; Future  -     CBC auto differential; Future  -     Comprehensive metabolic panel; Future  Diabetic diet  Continue current medication  Vitamin D deficiency disease  stable  Essential hypertension  -     Hemoglobin A1c; Future  -     Lipid panel; Future  Controlled  Low salt diet  Continue current medication  Osteoporosis screening  -     DXA Bone Density Spine And Hip; Future    Asymptomatic menopausal state   -     DXA Bone Density Spine And Hip; Future    Impacted cerumen of right ear  Patient recommended to try debrox OTC    Patient readiness: acceptance and barriers:none    During the course of the visit the patient was educated and counseled about the following:     Hypertension:   Medication: no change.  Dietary sodium restriction.  Regular aerobic exercise.    Goals: Hypertension: Reduce Blood Pressure    Did patient meet goals/outcomes: Yes    The following self management tools provided: declined    Patient Instructions (the written plan) was given to the patient/family.     Time spent with patient: 30 minutes

## 2017-07-14 NOTE — PATIENT INSTRUCTIONS
Debrox OTC ear drop for ear wax in right ear    Established High Blood Pressure    High blood pressure (hypertension) is a chronic disease. Often health care providers dont know what causes it. But it can be caused by certain health conditions and medicines.  If you have high blood pressure, you may not have any symptoms. If you do have symptoms, they may include headache, dizziness, changes in your vision, chest pain, and shortness of breath. But even without symptoms, high blood pressure thats not treated raises your risk for heart attack and stroke. High blood pressure is a serious health risk and shouldnt be ignored.  A blood pressure reading is made up of two numbers: a higher number over a lower number. The top number is the systolic pressure. The bottom number is the diastolic pressure. A normal blood pressure is less than 120 over less than 80.  High blood pressure is when either the top number is 140 or higher, or the bottom number is 90 or higher. This must be the result when taking your blood pressure a number of times. The blood pressures between normal and high are called prehypertension.  Home care  If you have high blood pressure, you should do what is listed below to lower your blood pressure. If you are taking medicines for high blood pressure, these methods may reduce or end your need for medicines in the future.  · Begin a weight-loss program if you are overweight.  · Cut back on how much salt you get in your diet. Heres how to do this:  ¨ Dont eat foods that have a lot of salt. These include olives, pickles, smoked meats, and salted potato chips.  ¨ Dont add salt to your food at the table.  ¨ Use only small amounts of salt when cooking.  · Begin an exercise program. Talk with your health care provider about the type of exercise program that would be best for you. It doesn't have to be hard. Even brisk walking for 20 minutes 3 times a week is a good form of exercise.  · Dont take  medicines that have heart stimulants. This includes many cold and sinus decongestant pills and sprays, as well as diet pills. Check the warnings about hypertension on the label. Stimulants such as amphetamine or cocaine could be lethal for someone with high blood pressure. Never take these.  · Limit how much caffeine you get in your diet. Switch to caffeine-free products.  · Stop smoking. If you are a long-time smoker, this can be hard. Enroll in a stop-smoking program to make it more likely that you will quit for good.  · Learn how to handle stress. This is an important part of any program to lower blood pressure. Learn about relaxation methods like meditation, yoga, or biofeedback.  · If your provider prescribed medicines, take them exactly as directed. Missing doses may cause your blood pressure get out of control.  · Consider buying an automatic blood pressure machine. You can get one of these at most pharmacies. Use this to watch your blood pressure at home. Give the results to your provider.  Follow-up care  You will need to make regular visits to your health care provider. This is to check your blood pressure and to make changes to your medicines. Make a follow-up appointment as directed.  When to seek medical advice  Call your health care provider right away if any of these occur:  · Chest pain or shortness of breath  · Severe headache  · Throbbing or rushing sound in the ears  · Nosebleed  · Sudden severe pain in your belly (abdomen)  · Extreme drowsiness, confusion, or fainting  · Dizziness or dizziness with a spinning sensation (vertigo)  · Weakness of an arm or leg or one side of the face  · You have problems speaking or seeing   Date Last Reviewed: 11/25/2014  © 6382-5244 [employer]. [address] [place] [address], [place] [address]. All rights reserved. This information is not intended as a substitute for professional medical care. Always follow your healthcare professional's  instructions.            Diabetes (General Information)  Diabetes is a long-term health problem. It means your body does not make enough insulin. Or it may mean that your body cannot use the insulin it makes. Insulin is a hormone in your body. It lets blood sugar (glucose) reach the cells in your body. All of your cells need glucose for fuel.  When you have diabetes, the glucose in your blood builds up because it cannot get into the cells. This buildup is called high blood sugar (hyperglycemia).  Your blood sugar level depends on several things. It depends on what kind of food you eat and how much of it you eat. It also depends on how much exercise you get, and how much insulin you have in your body. Eating too much of the wrong kinds of food or not taking diabetes medicine on time can cause high blood sugar. Infections can cause high blood sugar even if you are taking medicines correctly.  These things can also cause low blood sugar:  · Missing meals  · Not eating enough food  · Taking too much diabetes medicine  Diabetes can cause serious problems over time if you do not get treated. These problems include heart disease, stroke, kidney failure, and blindness. They also include nerve pain or loss of feeling in your legs and feet, and gangrene of the feet. By keeping your blood sugar under control you can prevent or delay these problems.  Normal blood sugar levels are 80 to 100 before a meal and less than 180 in the 1 to 2 hours after a meal.  Home care  Follow these guidelines when caring for yourself at home:  · Follow the diet your healthcare provider gives you. Take insulin or other diabetes medicine exactly as told to.  · Watch your blood sugar as you are told to. Keep a log of your results. This will help your provider change your medicines to keep your blood sugar under control.  · Try to reach your ideal weight. You may be able to cut back on or not have to take diabetes medicine if you eat the right foods  and get exercise.  · Do not smoke. Smoking worsens the effects of diabetes on your circulation. You are much more likely to have a heart attack if you have diabetes and you smoke.  · Take good care of your feet. If you have lost feeling in your feet, you may not see an injury or infection. Check your feet and between your toes at least once a week.  · Wear a medical alert bracelet or necklace, or carry a card in your wallet that says you have diabetes. This will help healthcare providers give you the right care if you get very ill and cannot tell them that you have diabetes.  Sick day plan  If you get a cold, the flu, or a bacterial or viral infection, take these steps:  · Look at your diabetes sick plan and call your healthcare provider as you were told to. You may need to call your provider right away if:  ¨ Your blood sugar is above 240 while taking your diabetes medicine  ¨ Your urine ketone levels are above normal or high  ¨ You have been vomiting more than 6 hours  ¨ You have trouble breathing or your breath ha s a fruity smell  ¨ You have a high fever  ¨ You have a fever for several days and you are not getting better  ¨ You get light-headed and are sleepier than usual  · Keep taking your diabetes pills (oral medicine) even if you have been vomiting and are feeling sick. Call your provider right away because you may need insulin to lower your blood sugar until you recover from your illness.  · Keep taking your insulin even if you have been vomiting and are feeling sick. Call your provider right away to ask if you need to change your insulin dose. This will depend on your blood sugar results.  · Check your blood sugar every 2 to 4 hours, or at least 4 times a day.  · Check your ketones often. If you are vomiting and having diarrhea, watch them more often.  · Do not skip meals. Try to eat small meals on a regular schedule. Do this even if you do not feel like eating.  · Drink water or other liquids that do not  have caffeine or calories. This will keep you from getting dehydrated. If you are nauseated or vomiting, takes small sips every 5 minutes. To prevent dehydration try to drink a cup (8 ounces) of fluids every hour while you are awake.  General care  Always bring a source of fast-acting sugar with you in case you have symptoms of low blood sugar (below 70). At the first sign of low blood sugar, eat or drink 15 to 20 grams of fast-acting sugar to raise your blood sugar. Examples are:  · 3 to 4 glucose tablets. You can buy these at most [employer].  · 4 ounces (1/2 cup) of regular (not diet) soft drinks  · 4 ounces (1/2 cup) of any fruit juice  · 8 ounces (1 cup) of milk  · 5 to 6 pieces of hard candy  · 1 tablespoon of honey  Check your blood sugar 15 minutes after treating yourself. If it is still below 70, take 15 to 20 more grams of fast-acting sugar. Test again in 15 minutes. If it returns to normal (70 or above), eat a snack or meal to keep your blood sugar in a safe range. If it stays low, call your doctor or go to an emergency room.  Follow-up care  Follow-up with your healthcare provider, or as advised. For more information about diabetes, visit the American Diabetes Association website at www.diabetes.org or call 891-436-1764.  When to seek medical advice  Call your healthcare provider right away if you have any of these symptoms of high blood sugar:  · Frequent urination  · Dizziness  · Drowsiness  · Thirst  · Headache  · Nausea or vomiting  · Abdominal pain  · Eyesight changes  · Fast breathing  · Confusion or loss of consciousness  Also call your provider right away if you have any of these signs of low blood sugar:  · Fatigue  · Headache  · Shakes  · Excess sweating  · Hunger  · Feeling anxious or restless  · Eyesight changes  · Drowsiness  · Weakness  · Confusion or loss of consciousness  Call 911  Call for emergency help right away if any of these occur:  · Chest pain or shortness of breath  · Dizziness  or fainting  · Weakness of an arm or leg or one side of the face  · Trouble speaking or seeing   Date Last Reviewed: 6/1/2016  © 2114-5428 [employer]. [address] [place] [address], [place] [address]. All rights reserved. This information is not intended as a substitute for professional medical care. Always follow your healthcare professional's instructions.

## 2017-08-02 ENCOUNTER — HOSPITAL ENCOUNTER (OUTPATIENT)
Dept: RADIOLOGY | Facility: CLINIC | Age: 77
Discharge: HOME | End: 2017-08-02
Payer: MEDICARE

## 2017-08-02 DIAGNOSIS — Z13.820 OSTEOPOROSIS SCREENING: ICD-10-CM

## 2017-08-02 DIAGNOSIS — Z78.0 ASYMPTOMATIC MENOPAUSAL STATE: ICD-10-CM

## 2017-08-02 PROCEDURE — 77080 DXA BONE DENSITY AXIAL: CPT | Mod: 26,,, | Performed by: RADIOLOGY

## 2017-08-02 PROCEDURE — 77080 DXA BONE DENSITY AXIAL: CPT | Mod: TC,PO

## 2017-10-25 ENCOUNTER — LAB VISIT (OUTPATIENT)
Dept: LAB | Facility: HOSPITAL | Age: 77
End: 2017-10-25
Attending: FAMILY MEDICINE
Payer: MEDICARE

## 2017-10-25 DIAGNOSIS — I10 ESSENTIAL HYPERTENSION: ICD-10-CM

## 2017-10-25 DIAGNOSIS — E87.6 HYPOKALEMIA: Primary | ICD-10-CM

## 2017-10-25 DIAGNOSIS — E11.9 CONTROLLED TYPE 2 DIABETES MELLITUS WITHOUT COMPLICATION, WITHOUT LONG-TERM CURRENT USE OF INSULIN: ICD-10-CM

## 2017-10-25 LAB
ALBUMIN SERPL BCP-MCNC: 3.8 G/DL
ALP SERPL-CCNC: 44 U/L
ALT SERPL W/O P-5'-P-CCNC: 15 U/L
ANION GAP SERPL CALC-SCNC: 8 MMOL/L
AST SERPL-CCNC: 16 U/L
BASOPHILS # BLD AUTO: 0.09 K/UL
BASOPHILS NFR BLD: 1.1 %
BILIRUB SERPL-MCNC: 0.5 MG/DL
BUN SERPL-MCNC: 17 MG/DL
CALCIUM SERPL-MCNC: 9.3 MG/DL
CHLORIDE SERPL-SCNC: 103 MMOL/L
CHOLEST SERPL-MCNC: 193 MG/DL
CHOLEST/HDLC SERPL: 3.9 {RATIO}
CO2 SERPL-SCNC: 31 MMOL/L
CREAT SERPL-MCNC: 0.9 MG/DL
DIFFERENTIAL METHOD: NORMAL
EOSINOPHIL # BLD AUTO: 0.2 K/UL
EOSINOPHIL NFR BLD: 2.8 %
ERYTHROCYTE [DISTWIDTH] IN BLOOD BY AUTOMATED COUNT: 13.6 %
EST. GFR  (AFRICAN AMERICAN): >60 ML/MIN/1.73 M^2
EST. GFR  (NON AFRICAN AMERICAN): >60 ML/MIN/1.73 M^2
ESTIMATED AVG GLUCOSE: 131 MG/DL
GLUCOSE SERPL-MCNC: 138 MG/DL
HBA1C MFR BLD HPLC: 6.2 %
HCT VFR BLD AUTO: 42.2 %
HDLC SERPL-MCNC: 49 MG/DL
HDLC SERPL: 25.4 %
HGB BLD-MCNC: 13.6 G/DL
IMM GRANULOCYTES # BLD AUTO: 0.02 K/UL
IMM GRANULOCYTES NFR BLD AUTO: 0.2 %
LDLC SERPL CALC-MCNC: 117.2 MG/DL
LYMPHOCYTES # BLD AUTO: 2.3 K/UL
LYMPHOCYTES NFR BLD: 28.6 %
MCH RBC QN AUTO: 28.3 PG
MCHC RBC AUTO-ENTMCNC: 32.2 G/DL
MCV RBC AUTO: 88 FL
MONOCYTES # BLD AUTO: 0.7 K/UL
MONOCYTES NFR BLD: 8.3 %
NEUTROPHILS # BLD AUTO: 4.8 K/UL
NEUTROPHILS NFR BLD: 59 %
NONHDLC SERPL-MCNC: 144 MG/DL
NRBC BLD-RTO: 0 /100 WBC
PLATELET # BLD AUTO: 299 K/UL
PMV BLD AUTO: 10.6 FL
POTASSIUM SERPL-SCNC: 3.4 MMOL/L
PROT SERPL-MCNC: 7 G/DL
RBC # BLD AUTO: 4.81 M/UL
SODIUM SERPL-SCNC: 142 MMOL/L
TRIGL SERPL-MCNC: 134 MG/DL
WBC # BLD AUTO: 8.17 K/UL

## 2017-10-25 PROCEDURE — 85025 COMPLETE CBC W/AUTO DIFF WBC: CPT

## 2017-10-25 PROCEDURE — 36415 COLL VENOUS BLD VENIPUNCTURE: CPT | Mod: PO

## 2017-10-25 PROCEDURE — 83036 HEMOGLOBIN GLYCOSYLATED A1C: CPT

## 2017-10-25 PROCEDURE — 80053 COMPREHEN METABOLIC PANEL: CPT

## 2017-10-25 PROCEDURE — 80061 LIPID PANEL: CPT

## 2017-11-01 DIAGNOSIS — I10 ESSENTIAL HYPERTENSION: ICD-10-CM

## 2017-11-01 RX ORDER — HYDROCHLOROTHIAZIDE 25 MG/1
25 TABLET ORAL DAILY
Qty: 90 TABLET | Refills: 3 | Status: SHIPPED | OUTPATIENT
Start: 2017-11-01 | End: 2018-01-12 | Stop reason: SDUPTHER

## 2017-11-01 RX ORDER — BENAZEPRIL HYDROCHLORIDE 40 MG/1
40 TABLET ORAL DAILY
Qty: 90 TABLET | Refills: 3 | Status: SHIPPED | OUTPATIENT
Start: 2017-11-01 | End: 2018-01-12 | Stop reason: SDUPTHER

## 2017-11-09 DIAGNOSIS — Z12.31 SCREENING MAMMOGRAM, ENCOUNTER FOR: Primary | ICD-10-CM

## 2017-11-10 ENCOUNTER — TELEPHONE (OUTPATIENT)
Dept: FAMILY MEDICINE | Facility: CLINIC | Age: 77
End: 2017-11-10

## 2017-11-10 NOTE — TELEPHONE ENCOUNTER
----- Message from Lorie Teresa sent at 11/9/2017  2:08 PM CST -----  Contact: patient  Patient, Alexa Otero, [phone] is calling regarding a request for mammogram orders to be added for [facility]. - [address], phone - [phone].  Please advise.  Thanks!

## 2017-12-14 RX ORDER — OMEPRAZOLE 40 MG/1
CAPSULE, DELAYED RELEASE ORAL
Qty: 90 CAPSULE | Refills: 3 | Status: SHIPPED | OUTPATIENT
Start: 2017-12-14 | End: 2018-01-12 | Stop reason: SDUPTHER

## 2018-01-11 ENCOUNTER — DOCUMENTATION ONLY (OUTPATIENT)
Dept: FAMILY MEDICINE | Facility: CLINIC | Age: 78
End: 2018-01-11

## 2018-01-11 NOTE — PROGRESS NOTES
Pre-Visit Chart Review  For Appointment Scheduled on 1-12-18    Health Maintenance Due   Topic Date Due    Influenza Vaccine  08/01/2017    Foot Exam  09/27/2017

## 2018-01-12 ENCOUNTER — OFFICE VISIT (OUTPATIENT)
Dept: FAMILY MEDICINE | Facility: CLINIC | Age: 78
End: 2018-01-12
Payer: MEDICARE

## 2018-01-12 VITALS
BODY MASS INDEX: 27.27 KG/M2 | WEIGHT: 153.88 LBS | SYSTOLIC BLOOD PRESSURE: 130 MMHG | HEART RATE: 89 BPM | HEIGHT: 63 IN | DIASTOLIC BLOOD PRESSURE: 81 MMHG | TEMPERATURE: 98 F

## 2018-01-12 DIAGNOSIS — E78.5 HYPERLIPIDEMIA, UNSPECIFIED HYPERLIPIDEMIA TYPE: Primary | ICD-10-CM

## 2018-01-12 DIAGNOSIS — E55.9 VITAMIN D DEFICIENCY DISEASE: ICD-10-CM

## 2018-01-12 DIAGNOSIS — I10 ESSENTIAL HYPERTENSION: ICD-10-CM

## 2018-01-12 DIAGNOSIS — K21.9 GASTROESOPHAGEAL REFLUX DISEASE, ESOPHAGITIS PRESENCE NOT SPECIFIED: ICD-10-CM

## 2018-01-12 DIAGNOSIS — D75.1 POLYCYTHEMIA, SECONDARY: ICD-10-CM

## 2018-01-12 DIAGNOSIS — Z87.19 HISTORY OF BARRETT'S ESOPHAGUS: ICD-10-CM

## 2018-01-12 DIAGNOSIS — E11.9 CONTROLLED TYPE 2 DIABETES MELLITUS WITHOUT COMPLICATION, WITHOUT LONG-TERM CURRENT USE OF INSULIN: ICD-10-CM

## 2018-01-12 PROCEDURE — 99499 UNLISTED E&M SERVICE: CPT | Mod: S$GLB,,, | Performed by: FAMILY MEDICINE

## 2018-01-12 PROCEDURE — 99999 PR PBB SHADOW E&M-EST. PATIENT-LVL III: CPT | Mod: PBBFAC,,, | Performed by: FAMILY MEDICINE

## 2018-01-12 PROCEDURE — 99214 OFFICE O/P EST MOD 30 MIN: CPT | Mod: S$GLB,,, | Performed by: FAMILY MEDICINE

## 2018-01-12 RX ORDER — HYDROCHLOROTHIAZIDE 25 MG/1
25 TABLET ORAL DAILY
Qty: 90 TABLET | Refills: 3 | Status: SHIPPED | OUTPATIENT
Start: 2018-01-12 | End: 2018-12-17 | Stop reason: SDUPTHER

## 2018-01-12 RX ORDER — METFORMIN HYDROCHLORIDE 500 MG/1
500 TABLET ORAL 2 TIMES DAILY WITH MEALS
Qty: 180 TABLET | Refills: 3 | Status: SHIPPED | OUTPATIENT
Start: 2018-01-12 | End: 2018-12-17 | Stop reason: SDUPTHER

## 2018-01-12 RX ORDER — TRAZODONE HYDROCHLORIDE 50 MG/1
50 TABLET ORAL NIGHTLY PRN
Qty: 30 TABLET | Refills: 5 | Status: SHIPPED | OUTPATIENT
Start: 2018-01-12 | End: 2018-02-08

## 2018-01-12 RX ORDER — BENAZEPRIL HYDROCHLORIDE 40 MG/1
40 TABLET ORAL DAILY
Qty: 90 TABLET | Refills: 3 | Status: SHIPPED | OUTPATIENT
Start: 2018-01-12 | End: 2018-12-17 | Stop reason: SDUPTHER

## 2018-01-12 RX ORDER — SIMVASTATIN 40 MG/1
40 TABLET, FILM COATED ORAL NIGHTLY
Qty: 90 TABLET | Refills: 3 | Status: SHIPPED | OUTPATIENT
Start: 2018-01-12 | End: 2018-12-17 | Stop reason: SDUPTHER

## 2018-01-12 RX ORDER — OMEPRAZOLE 40 MG/1
40 CAPSULE, DELAYED RELEASE ORAL DAILY
Qty: 90 CAPSULE | Refills: 3 | Status: SHIPPED | OUTPATIENT
Start: 2018-01-12 | End: 2018-12-17 | Stop reason: SDUPTHER

## 2018-01-27 NOTE — PROGRESS NOTES
Subjective:       Patient ID: Alexa Otero is a 77 y.o. female.    Chief Complaint: Follow-up    Patient Active Problem List   Diagnosis    Sciatica    Syncope and collapse    Polycythemia, secondary    Hyperlipemia    Diabetes mellitus type 2, controlled    HTN (hypertension)    Acute chest pain    Vitamin D deficiency disease    Gastroesophageal reflux disease    History of Kwon's esophagus    Pancreatic pseudocyst/cyst    Hepatic cyst    Low back pain radiating to both legs   c/o chest discomfort , h/o barretts esophagus. GI  Tony-will make appt-due for surveillance EGD.  Taking ranitidine and omeprazole.  No dysphagia  HPI  Review of Systems   Constitutional: Negative for fatigue and unexpected weight change.   Respiratory: Negative for chest tightness and shortness of breath.    Cardiovascular: Positive for chest pain. Negative for palpitations and leg swelling.   Gastrointestinal: Negative for abdominal pain.   Musculoskeletal: Negative for arthralgias.   Neurological: Negative for dizziness, syncope, light-headedness and headaches.       Objective:      Physical Exam   Constitutional: She is oriented to person, place, and time. She appears well-developed and well-nourished.   Cardiovascular: Normal rate, regular rhythm and normal heart sounds.    Pulses:       Dorsalis pedis pulses are 3+ on the right side, and 3+ on the left side.        Posterior tibial pulses are 3+ on the right side, and 3+ on the left side.   Pulmonary/Chest: Effort normal and breath sounds normal.   Musculoskeletal: She exhibits no edema.        Right foot: There is normal range of motion and no deformity.        Left foot: There is normal range of motion and no deformity.   Feet:   Right Foot:   Protective Sensation: 10 sites tested. 10 sites sensed.   Skin Integrity: Negative for ulcer, blister or skin breakdown.   Left Foot:   Protective Sensation: 10 sites tested. 10 sites sensed.   Skin Integrity: Negative  for ulcer, blister or skin breakdown.   Neurological: She is alert and oriented to person, place, and time.   Skin: Skin is warm and dry.   Psychiatric: She has a normal mood and affect.   Nursing note and vitals reviewed.      Assessment:       1. Hyperlipidemia, unspecified hyperlipidemia type    2. Essential hypertension    3. Controlled type 2 diabetes mellitus without complication, without long-term current use of insulin    4. Vitamin D deficiency disease    5. History of Kwon's esophagus    6. Gastroesophageal reflux disease, esophagitis presence not specified    7. Polycythemia, secondary        Plan:       1. Essential hypertension  Controlled on current medications.  Continue current medications.    - hydroCHLOROthiazide (HYDRODIURIL) 25 MG tablet; Take 1 tablet (25 mg total) by mouth once daily.  Dispense: 90 tablet; Refill: 3  - benazepril (LOTENSIN) 40 MG tablet; Take 1 tablet (40 mg total) by mouth once daily.  Dispense: 90 tablet; Refill: 3    2. Hyperlipidemia, unspecified hyperlipidemia type  Stable condition.  Continue current medications.  Will adjust based on lab findings or if condition changes.    - CBC auto differential; Future  - Comprehensive metabolic panel; Future  - Lipid panel; Future    3. Controlled type 2 diabetes mellitus without complication, without long-term current use of insulin  Controlled on current medications.  Continue current medications.    - Hemoglobin A1c; Future  - Microalbumin/creatinine urine ratio; Future    4. Vitamin D deficiency disease  Stable condition.  Continue current medications.  Will adjust based on lab findings or if condition changes.    - Vitamin D; Future    5. History of Kwon's esophagus  F/u with GI for surveillance EGD asap and cont current meds    6. Gastroesophageal reflux disease, esophagitis presence not specified  Counseled patient on prevention of reflux with changes in diet and behavior.  I recommended avoidance of greasy and spicy  foods, caffeine and eating within 3 hours of bedtime.  I counseled the patient to avoid eating large meals and instead eating more frequent small meals.  I also recommended weight loss and elevation of the head of the bed by 6 inches.  If symptoms persist after these changes medication may be needed to control GERD.        7. Polycythemia, secondary  Cont to monitor and treat as needed    [facility] goal documentation:  Patient readiness: acceptance and barriers:readiness  During the course of the visit the patient was educated and counseled about the following: Diabetes:  Discussed general issues about diabetes pathophysiology and management.  Hypertension:   Dietary sodium restriction.  Regular aerobic exercise.  Goals: Diabetes: Maintain Hemoglobin A1C below 7 and Hypertension: Reduce Blood Pressure  Goal/Outcomes met:Hypertension and type 2 DM  The following self management tools provided:none  Patient Instructions (the written plan) was given to the patient/family: Yes  Time spent with patient: 20 minutes    Patient with be reevaluated in 4 months or sooner prn    Greater than 50% of this visit was spent counseling as described in above documentation:Yes

## 2018-02-08 ENCOUNTER — OFFICE VISIT (OUTPATIENT)
Dept: DERMATOLOGY | Facility: CLINIC | Age: 78
End: 2018-02-08
Payer: MEDICARE

## 2018-02-08 VITALS — WEIGHT: 153 LBS | HEIGHT: 63 IN | BODY MASS INDEX: 27.11 KG/M2

## 2018-02-08 DIAGNOSIS — Z85.828 HISTORY OF SKIN CANCER: ICD-10-CM

## 2018-02-08 DIAGNOSIS — L81.4 SOLAR LENTIGO: ICD-10-CM

## 2018-02-08 DIAGNOSIS — D18.01 CHERRY ANGIOMA: ICD-10-CM

## 2018-02-08 DIAGNOSIS — L82.0 INFLAMED SEBORRHEIC KERATOSIS: ICD-10-CM

## 2018-02-08 DIAGNOSIS — L57.0 ACTINIC KERATOSES: Primary | ICD-10-CM

## 2018-02-08 DIAGNOSIS — L82.1 SEBORRHEIC KERATOSES: ICD-10-CM

## 2018-02-08 PROCEDURE — 99213 OFFICE O/P EST LOW 20 MIN: CPT | Mod: 25,S$GLB,, | Performed by: DERMATOLOGY

## 2018-02-08 PROCEDURE — 99999 PR PBB SHADOW E&M-EST. PATIENT-LVL III: CPT | Mod: PBBFAC,,, | Performed by: DERMATOLOGY

## 2018-02-08 PROCEDURE — 17003 DESTRUCT PREMALG LES 2-14: CPT | Mod: S$GLB,,, | Performed by: DERMATOLOGY

## 2018-02-08 PROCEDURE — 17000 DESTRUCT PREMALG LESION: CPT | Mod: 59,S$GLB,, | Performed by: DERMATOLOGY

## 2018-02-08 PROCEDURE — 1159F MED LIST DOCD IN RCRD: CPT | Mod: S$GLB,,, | Performed by: DERMATOLOGY

## 2018-02-08 PROCEDURE — 3008F BODY MASS INDEX DOCD: CPT | Mod: S$GLB,,, | Performed by: DERMATOLOGY

## 2018-02-08 PROCEDURE — 1126F AMNT PAIN NOTED NONE PRSNT: CPT | Mod: S$GLB,,, | Performed by: DERMATOLOGY

## 2018-02-08 PROCEDURE — 17110 DESTRUCTION B9 LES UP TO 14: CPT | Mod: S$GLB,,, | Performed by: DERMATOLOGY

## 2018-02-08 NOTE — PROGRESS NOTES
Subjective:       Patient ID:  Alexa Otero is a 78 y.o. female who presents for   Chief Complaint   Patient presents with    Skin Check     UBSE    Spot     abdomen     Patient last seen 5/11/2017 with benign lesions and AKs treated with cryo  H/o SCC groin in 80s (Dr Wright)  Remote intense sun exposure, , remote short tanning bed use    Interim melanoma dx in brother  Brother sister and son with h/o SCC    This is a high risk patient here to check for the development of new lesions.      Spot  - Initial  Affected locations: abdomen  Duration: years.  Signs / symptoms: itching  Severity: mild  Timing: constant  Aggravated by: nothing  Relieving factors/Treatments tried: nothing        Review of Systems   Constitutional: Negative for fever, chills and fatigue.   Skin: Positive for daily sunscreen use. Negative for activity-related sunscreen use and wears hat.   Hematologic/Lymphatic: Bruises/bleeds easily.        Objective:    Physical Exam   Constitutional: She appears well-developed and well-nourished. No distress.   Neurological: She is alert and oriented to person, place, and time. She is not disoriented.   Psychiatric: She has a normal mood and affect.   Skin:   Areas Examined (abnormalities noted in diagram):   Scalp / Hair Palpated and Inspected  Head / Face Inspection Performed  Neck Inspection Performed  Chest / Axilla Inspection Performed  Abdomen Inspection Performed  Back Inspection Performed  RUE Inspected  LUE Inspection Performed  Nails and Digits Inspection Performed                       Diagram Legend     Erythematous scaling macule/papule c/w actinic keratosis       Vascular papule c/w angioma      Pigmented verrucoid papule/plaque c/w seborrheic keratosis      Yellow umbilicated papule c/w sebaceous hyperplasia      Irregularly shaped tan macule c/w lentigo     1-2 mm smooth white papules consistent with Milia      Movable subcutaneous cyst with punctum c/w epidermal inclusion  cyst      Subcutaneous movable cyst c/w pilar cyst      Firm pink to brown papule c/w dermatofibroma      Pedunculated fleshy papule(s) c/w skin tag(s)      Evenly pigmented macule c/w junctional nevus     Mildly variegated pigmented, slightly irregular-bordered macule c/w mildly atypical nevus      Flesh colored to evenly pigmented papule c/w intradermal nevus       Pink pearly papule/plaque c/w basal cell carcinoma      Erythematous hyperkeratotic cursted plaque c/w SCC      Surgical scar with no sign of skin cancer recurrence      Open and closed comedones      Inflammatory papules and pustules      Verrucoid papule consistent consistent with wart     Erythematous eczematous patches and plaques     Dystrophic onycholytic nail with subungual debris c/w onychomycosis     Umbilicated papule    Erythematous-base heme-crusted tan verrucoid plaque consistent with inflamed seborrheic keratosis     Erythematous Silvery Scaling Plaque c/w Psoriasis     See annotation      Assessment / Plan:        Actinic keratoses  Cryosurgery Procedure Note    Verbal consent from the patient is obtained and the patient is aware of the precancerous quality and need for treatment of these lesions. Liquid nitrogen cryosurgery is applied to the 4 actinic keratoses, as detailed in the physical exam, to produce a freeze injury. The patient is aware that blisters may form and is instructed on wound care with gentle cleansing and use of vaseline ointment to keep moist until healed. The patient is supplied a handout on cryosurgery and is instructed to call if lesions do not completely resolve.    Inflamed seborrheic keratosis, pruritic, requests tx  Cryosurgery procedure note:    Verbal consent from the patient is obtained. Liquid nitrogen cryosurgery is applied to 3 lesions to produce a freeze injury. The patient is aware that blisters may form and is instructed on wound care with gentle cleansing and use of vaseline ointment to keep moist  until healed. The patient is supplied a handout on cryosurgery and is instructed to call if lesions do not completely resolve.    Seborrheic keratoses  These are benign inherited growths without a malignant potential. Reassurance given to patient. No treatment is necessary.     Cherry angioma  This is a benign vascular lesion. Reassurance given. No treatment required.     Solar lentigo  This is a benign hyperpigmented sun induced lesion. Daily sun protection will reduce the number of new lesions. Treatment of these benign lesions are considered cosmetic.    History of skin cancer  Upper body skin examination performed today including at least 9 points as noted in physical examination. No lesions suspicious for malignancy noted.    Patient instructed in importance in daily sun protection of at least spf 30. Sun avoidance and topical protection discussed.   Recommend Elta MD (physician office) COTZ sensitive (available online) for daily use on face and neck.  Patient encouraged to wear hat for all outdoor exposure.   Also discussed sun protective clothing           Follow-up in about 1 year (around 2/8/2019).

## 2018-02-08 NOTE — PATIENT INSTRUCTIONS

## 2018-04-17 ENCOUNTER — LAB VISIT (OUTPATIENT)
Dept: LAB | Facility: HOSPITAL | Age: 78
End: 2018-04-17
Attending: FAMILY MEDICINE
Payer: MEDICARE

## 2018-04-17 DIAGNOSIS — E55.9 VITAMIN D DEFICIENCY DISEASE: ICD-10-CM

## 2018-04-17 DIAGNOSIS — E11.9 CONTROLLED TYPE 2 DIABETES MELLITUS WITHOUT COMPLICATION, WITHOUT LONG-TERM CURRENT USE OF INSULIN: ICD-10-CM

## 2018-04-17 DIAGNOSIS — E78.5 HYPERLIPIDEMIA, UNSPECIFIED HYPERLIPIDEMIA TYPE: ICD-10-CM

## 2018-04-17 LAB
25(OH)D3+25(OH)D2 SERPL-MCNC: 48 NG/ML
ALBUMIN SERPL BCP-MCNC: 3.9 G/DL
ALP SERPL-CCNC: 45 U/L
ALT SERPL W/O P-5'-P-CCNC: 22 U/L
ANION GAP SERPL CALC-SCNC: 13 MMOL/L
AST SERPL-CCNC: 20 U/L
BASOPHILS # BLD AUTO: 0.06 K/UL
BASOPHILS NFR BLD: 0.7 %
BILIRUB SERPL-MCNC: 0.6 MG/DL
BUN SERPL-MCNC: 24 MG/DL
CALCIUM SERPL-MCNC: 10.2 MG/DL
CHLORIDE SERPL-SCNC: 103 MMOL/L
CHOLEST SERPL-MCNC: 187 MG/DL
CHOLEST/HDLC SERPL: 4 {RATIO}
CO2 SERPL-SCNC: 26 MMOL/L
CREAT SERPL-MCNC: 0.9 MG/DL
DIFFERENTIAL METHOD: NORMAL
EOSINOPHIL # BLD AUTO: 0.2 K/UL
EOSINOPHIL NFR BLD: 2.2 %
ERYTHROCYTE [DISTWIDTH] IN BLOOD BY AUTOMATED COUNT: 13.3 %
EST. GFR  (AFRICAN AMERICAN): >60 ML/MIN/1.73 M^2
EST. GFR  (NON AFRICAN AMERICAN): >60 ML/MIN/1.73 M^2
ESTIMATED AVG GLUCOSE: 137 MG/DL
GLUCOSE SERPL-MCNC: 119 MG/DL
HBA1C MFR BLD HPLC: 6.4 %
HCT VFR BLD AUTO: 41.5 %
HDLC SERPL-MCNC: 47 MG/DL
HDLC SERPL: 25.1 %
HGB BLD-MCNC: 13.4 G/DL
IMM GRANULOCYTES # BLD AUTO: 0.02 K/UL
IMM GRANULOCYTES NFR BLD AUTO: 0.2 %
LDLC SERPL CALC-MCNC: 122.8 MG/DL
LYMPHOCYTES # BLD AUTO: 2.6 K/UL
LYMPHOCYTES NFR BLD: 28.3 %
MCH RBC QN AUTO: 29.1 PG
MCHC RBC AUTO-ENTMCNC: 32.3 G/DL
MCV RBC AUTO: 90 FL
MONOCYTES # BLD AUTO: 0.7 K/UL
MONOCYTES NFR BLD: 7.2 %
NEUTROPHILS # BLD AUTO: 5.6 K/UL
NEUTROPHILS NFR BLD: 61.4 %
NONHDLC SERPL-MCNC: 140 MG/DL
NRBC BLD-RTO: 0 /100 WBC
PLATELET # BLD AUTO: 282 K/UL
PMV BLD AUTO: 10.1 FL
POTASSIUM SERPL-SCNC: 3.9 MMOL/L
PROT SERPL-MCNC: 7.2 G/DL
RBC # BLD AUTO: 4.61 M/UL
SODIUM SERPL-SCNC: 142 MMOL/L
TRIGL SERPL-MCNC: 86 MG/DL
WBC # BLD AUTO: 9.05 K/UL

## 2018-04-17 PROCEDURE — 80053 COMPREHEN METABOLIC PANEL: CPT

## 2018-04-17 PROCEDURE — 85025 COMPLETE CBC W/AUTO DIFF WBC: CPT

## 2018-04-17 PROCEDURE — 82306 VITAMIN D 25 HYDROXY: CPT

## 2018-04-17 PROCEDURE — 80061 LIPID PANEL: CPT

## 2018-04-17 PROCEDURE — 36415 COLL VENOUS BLD VENIPUNCTURE: CPT | Mod: PO

## 2018-04-17 PROCEDURE — 83036 HEMOGLOBIN GLYCOSYLATED A1C: CPT

## 2018-04-18 ENCOUNTER — DOCUMENTATION ONLY (OUTPATIENT)
Dept: FAMILY MEDICINE | Facility: CLINIC | Age: 78
End: 2018-04-18

## 2018-04-18 NOTE — PROGRESS NOTES
Pre-Visit Chart Review  For Appointment Scheduled on 4-19-18    There are no preventive care reminders to display for this patient.

## 2018-04-19 ENCOUNTER — TELEPHONE (OUTPATIENT)
Dept: ORTHOPEDICS | Facility: CLINIC | Age: 78
End: 2018-04-19

## 2018-04-19 ENCOUNTER — OFFICE VISIT (OUTPATIENT)
Dept: FAMILY MEDICINE | Facility: CLINIC | Age: 78
End: 2018-04-19
Payer: MEDICARE

## 2018-04-19 VITALS
HEIGHT: 63 IN | BODY MASS INDEX: 27.57 KG/M2 | DIASTOLIC BLOOD PRESSURE: 76 MMHG | TEMPERATURE: 98 F | HEART RATE: 84 BPM | WEIGHT: 155.63 LBS | SYSTOLIC BLOOD PRESSURE: 132 MMHG

## 2018-04-19 DIAGNOSIS — I10 ESSENTIAL HYPERTENSION: ICD-10-CM

## 2018-04-19 DIAGNOSIS — E11.9 CONTROLLED TYPE 2 DIABETES MELLITUS WITHOUT COMPLICATION, WITHOUT LONG-TERM CURRENT USE OF INSULIN: ICD-10-CM

## 2018-04-19 DIAGNOSIS — D75.1 POLYCYTHEMIA, SECONDARY: ICD-10-CM

## 2018-04-19 DIAGNOSIS — E78.5 HYPERLIPIDEMIA, UNSPECIFIED HYPERLIPIDEMIA TYPE: Primary | ICD-10-CM

## 2018-04-19 DIAGNOSIS — Z87.19 HISTORY OF BARRETT'S ESOPHAGUS: ICD-10-CM

## 2018-04-19 DIAGNOSIS — M25.571 RIGHT ANKLE PAIN, UNSPECIFIED CHRONICITY: Primary | ICD-10-CM

## 2018-04-19 DIAGNOSIS — E55.9 VITAMIN D DEFICIENCY DISEASE: ICD-10-CM

## 2018-04-19 DIAGNOSIS — G89.29 CHRONIC PAIN OF RIGHT ANKLE: ICD-10-CM

## 2018-04-19 DIAGNOSIS — M25.571 CHRONIC PAIN OF RIGHT ANKLE: ICD-10-CM

## 2018-04-19 PROCEDURE — 99999 PR PBB SHADOW E&M-EST. PATIENT-LVL IV: CPT | Mod: PBBFAC,,, | Performed by: FAMILY MEDICINE

## 2018-04-19 PROCEDURE — 3075F SYST BP GE 130 - 139MM HG: CPT | Mod: S$GLB,,, | Performed by: FAMILY MEDICINE

## 2018-04-19 PROCEDURE — 99214 OFFICE O/P EST MOD 30 MIN: CPT | Mod: S$GLB,,, | Performed by: FAMILY MEDICINE

## 2018-04-19 PROCEDURE — 3078F DIAST BP <80 MM HG: CPT | Mod: S$GLB,,, | Performed by: FAMILY MEDICINE

## 2018-04-19 NOTE — PATIENT INSTRUCTIONS
Turmeric , fish oil and glucosamine and chrondroitin supplement.  Try OTC tylenol PM before bedtime

## 2018-04-19 NOTE — PROGRESS NOTES
Subjective:       Patient ID: Alexa Otero is a 78 y.o. female.    Chief Complaint: Follow-up    Patient Active Problem List   Diagnosis    Sciatica    Syncope and collapse    Polycythemia, secondary    Hyperlipemia    Diabetes mellitus type 2, controlled    HTN (hypertension)    Acute chest pain    Vitamin D deficiency disease    Gastroesophageal reflux disease    History of Kwon's esophagus    Pancreatic pseudocyst/cyst    Hepatic cyst    Low back pain radiating to both legs    Primary osteoarthritis of right ankle   GI Dr. Jimenez h/o Olu. Last egd 4/16 and then 8/16.  ts, Had stretta    Trazodone -no help. Slept 3 hours   HPI  Review of Systems   Constitutional: Negative for fatigue and unexpected weight change.   Respiratory: Negative for chest tightness and shortness of breath.    Cardiovascular: Negative for chest pain, palpitations and leg swelling.   Gastrointestinal: Negative for abdominal pain.   Musculoskeletal: Negative for arthralgias.   Neurological: Negative for dizziness, syncope, light-headedness and headaches.       Objective:      Physical Exam   Constitutional: She is oriented to person, place, and time. She appears well-developed and well-nourished.   Cardiovascular: Normal rate, regular rhythm and normal heart sounds.    Pulmonary/Chest: Effort normal and breath sounds normal.   Musculoskeletal: She exhibits no edema.   Neurological: She is alert and oriented to person, place, and time.   Skin: Skin is warm and dry.   Psychiatric: She has a normal mood and affect.   Nursing note and vitals reviewed.      Assessment:       1. Hyperlipidemia, unspecified hyperlipidemia type    2. Essential hypertension    3. Polycythemia, secondary    4. Controlled type 2 diabetes mellitus without complication, without long-term current use of insulin    5. Vitamin D deficiency disease    6. History of Kwon's esophagus    7. Chronic pain of right ankle        Plan:       1.  Hyperlipidemia, unspecified hyperlipidemia type  Stable condition.  Continue current medications.  Will adjust based on lab findings or if condition changes.    - Comprehensive metabolic panel; Future  - Lipid panel; Future    2. Essential hypertension  Controlled on current medications.  Continue current medications.      3. Polycythemia, secondary  Screen and treat as indicated:    - CBC auto differential; Future    4. Controlled type 2 diabetes mellitus without complication, without long-term current use of insulin  Stable condition.  Continue current medications.  Will adjust based on lab findings or if condition changes.    - Hemoglobin A1c; Future  - Microalbumin/creatinine urine ratio; Future    5. Vitamin D deficiency disease  Screen and treat as indicated:    - Vitamin D; Future    6. History of Kwon's esophagus  Refer for surveillance  - Ambulatory referral to Gastroenterology    7. Chronic pain of right ankle  Refer for eval and treat  - Ambulatory referral to Orthopedics    Reeval 6 months or sooner prn

## 2018-04-19 NOTE — TELEPHONE ENCOUNTER
----- Message from Yun Valentin sent at 4/19/2018  2:08 PM CDT -----  Pt was seen today by dr guardado and  Needs an appt for Chronic pain of right ankle  No appts available thru epic  Please call her @ [phone]  Thanks !

## 2018-04-20 ENCOUNTER — HOSPITAL ENCOUNTER (OUTPATIENT)
Dept: RADIOLOGY | Facility: HOSPITAL | Age: 78
Discharge: HOME | End: 2018-04-20
Attending: ORTHOPAEDIC SURGERY
Payer: MEDICARE

## 2018-04-20 ENCOUNTER — OFFICE VISIT (OUTPATIENT)
Dept: ORTHOPEDICS | Facility: CLINIC | Age: 78
End: 2018-04-20
Payer: MEDICARE

## 2018-04-20 VITALS
HEART RATE: 80 BPM | DIASTOLIC BLOOD PRESSURE: 82 MMHG | BODY MASS INDEX: 27.46 KG/M2 | WEIGHT: 155 LBS | HEIGHT: 63 IN | SYSTOLIC BLOOD PRESSURE: 165 MMHG

## 2018-04-20 DIAGNOSIS — M19.071 PRIMARY OSTEOARTHRITIS OF RIGHT ANKLE: ICD-10-CM

## 2018-04-20 DIAGNOSIS — M25.571 RIGHT ANKLE PAIN, UNSPECIFIED CHRONICITY: ICD-10-CM

## 2018-04-20 PROCEDURE — 73610 X-RAY EXAM OF ANKLE: CPT | Mod: 26,RT,, | Performed by: RADIOLOGY

## 2018-04-20 PROCEDURE — 3077F SYST BP >= 140 MM HG: CPT | Mod: S$GLB,,, | Performed by: ORTHOPAEDIC SURGERY

## 2018-04-20 PROCEDURE — 73610 X-RAY EXAM OF ANKLE: CPT | Mod: TC,PO,RT

## 2018-04-20 PROCEDURE — 99203 OFFICE O/P NEW LOW 30 MIN: CPT | Mod: S$GLB,,, | Performed by: ORTHOPAEDIC SURGERY

## 2018-04-20 PROCEDURE — 99999 PR PBB SHADOW E&M-EST. PATIENT-LVL III: CPT | Mod: PBBFAC,,, | Performed by: ORTHOPAEDIC SURGERY

## 2018-04-20 PROCEDURE — 3079F DIAST BP 80-89 MM HG: CPT | Mod: S$GLB,,, | Performed by: ORTHOPAEDIC SURGERY

## 2018-04-20 NOTE — LETTER
April 20, 2018      Idalia Greco MD  [address] [facility] [address]           [facility]  [address] [facility] [address] [place] [address]  Phone: [phone]          Patient: Alexa Otero   MR Number: [id number]   YOB: 1940   Date of Visit: 4/20/2018       Dear Dr. Idalia Greco:    Thank you for referring Alexa Otero to me for evaluation. Attached you will find relevant portions of my assessment and plan of care.    If you have questions, please do not hesitate to call me. I look forward to following Alexa Otero along with you.    Sincerely,    Casey Britton MD    Enclosure  CC:  No Recipients    If you would like to receive this communication electronically, please contact [email or web address] or [phone] to request more information on [facility] access.    For providers and/or their staff who would like to refer a patient to [facility], please contact us through our one-stop-shop provider referral line, [facility] , at [phone].    If you feel you have received this communication in error or would no longer like to receive these types of communications, please e-mail [email or web address]

## 2018-04-20 NOTE — PROGRESS NOTES
"HPI: Alexa Otero is a 78 y.o. female  who was referred to me by Dr. Greco and was seen in consultation today for chronic right ankle pain. She rates her pain as 1/10 today. The pain is worse at night and sometimes wakes her up at night. 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 The patient has T2DM and the last HbA1c was 6.2.    She has h/o of several ankle sprains when she was younger. No recent injury or trauma.     PAST MEDICAL/SURGICAL/FAMILY/SOCIAL/ HISTORY: REVIEWED    ALLERGIES/MEDICATIONS: REVIEWED       Review of Systems:     Constitution: Negative.   HEENT: Negative.   Eyes: Negative.   Cardiovascular: Negative.   Respiratory: Negative.   Endocrine: Negative.   Hematologic/Lymphatic: Negative.   Skin: Negative.   Musculoskeletal: Positive for right ankle pain   Gastrointestinal: Negative.   Genitourinary: Negative.   Neurological: Negative.   Psychiatric/Behavioral: Negative.   Allergic/Immunologic: Negative.       PHYSICAL EXAM:  Vitals:    04/20/18 1034   BP: (!) 165/82   Pulse: 80     Ht Readings from Last 1 Encounters:   04/20/18 5' 3" (1.6 m)     Wt Readings from Last 1 Encounters:   04/20/18 70.3 kg (155 lb)         GENERAL: Well developed, well nourished, no acute distress, very pleasant.  SKIN: Skin is intact. No atrophy, abrasions or lesions are noted.   Neurological: Normal mental status. Appropriate and conversant. Alert and oriented x 3.  GAIT: Walks with non-antalgic gait.    Right lower extremity compared with LLE:  2+ dorsalis pedis pulse.  Capillary refill < 3 seconds.  Decreased range of motion tibiotalar and subtalar joints. Pes cavus. 5/5 strength EHL, FHL, tibialis anterior, gastrocsoleus, tibialis posterior and peroneals. Sensation to light touch intact sural, saphenous, superficial peroneal and deep peroneal nerves. Mild swelling of the ankle, no ecchymosis or deformity. No lymphadenopathy, no masses or tumors palpated.        XRAYS:   3 views of right ankle " obtained and reviewed today reveal moderate to severe osteoarthritis of the ankle joint and moderate osteoarthritis of the subtalar joint and midfoot. No evidence of fractures or dislocations.       ASSESSMENT:        Encounter Diagnosis   Name Primary?    Primary osteoarthritis of right ankle         PLAN:  I spent 15 minutes in consulation with the patient today. More than half the time was spent counseling the patient on her condition and the options for operative versus non-operative care.  She says the pain is not bad enough for injection today. I discussed ankle arthroscopy, total ankle replacement and ankle fusion with her if conservative treatment fails.   She will f/u if she would like injection.

## 2018-04-30 ENCOUNTER — OFFICE VISIT (OUTPATIENT)
Dept: DERMATOLOGY | Facility: CLINIC | Age: 78
End: 2018-04-30
Payer: MEDICARE

## 2018-04-30 VITALS — BODY MASS INDEX: 27.46 KG/M2 | HEIGHT: 63 IN | WEIGHT: 155 LBS

## 2018-04-30 DIAGNOSIS — L82.1 SEBORRHEIC KERATOSES: ICD-10-CM

## 2018-04-30 DIAGNOSIS — L82.0 INFLAMED SEBORRHEIC KERATOSIS: Primary | ICD-10-CM

## 2018-04-30 PROCEDURE — 17110 DESTRUCTION B9 LES UP TO 14: CPT | Mod: S$GLB,,, | Performed by: DERMATOLOGY

## 2018-04-30 PROCEDURE — 99999 PR PBB SHADOW E&M-EST. PATIENT-LVL III: CPT | Mod: PBBFAC,,, | Performed by: DERMATOLOGY

## 2018-04-30 PROCEDURE — 99212 OFFICE O/P EST SF 10 MIN: CPT | Mod: 25,S$GLB,, | Performed by: DERMATOLOGY

## 2018-04-30 NOTE — PATIENT INSTRUCTIONS
XEROSIS (DRY SKIN)    Xerosis is the term for dry skin.  We all have a natural oil coating over our skin produced by the skin oil glands.  If this oil is removed, the skin becomes dry which can lead to cracking, which can lead to inflammation. Xerosis is usually a long-term problem that recurs often, especially in the winter.    1. Cause     Long hot baths or showers can remove our natural oil and lead to xerosis.  One should never take more than one bath or shower a day and for no longer than ten minutes.   Use of harsh soaps such as Zest, Dial, and Ivory can worsen and cause xerosis.   Cold winter weather worsens xerosis because the amount of moisture contained in cold air is much less than the amount of moisture in warm air.    2. Treatment     Treatment is intended to restore the natural oil to your skin.  Keep the skin lubricated.     Do not take more than one bath or shower a day.  Use lukewarm water, not hot.  Hot water dries out the skin.     Use a gentle moisturizing soap such as Cetaphil soap, cerave gentle cleanser, Oil of Olay, Dove sensitive bar, Basis, Ivory moisture care, Restoraderm cleanser.     When toweling dry, dont rub.  Blot the skin so there is still some water left on the skin.  Immediately after toweling, you should apply a moisturizing cream from neck to toes such as Cerave cream, Cetaphil cream, Restoraderm or Eucerin Original Formula cream.  Alpha hydroxyacid lotions, i.e., AmLactin or Cerave SA, also work very well for preventing dry skin, but may burn when used on inflamed or reddened skin.     If you like to swim during the winter months, you should not use soap when getting out of the pool.  When you have finished swimming, rinse off the chlorine with cool to warm water.  If this will be the only shower of the day, then you may use Cetaphil or another mild soap to cleanse your skin.  After the shower, apply a moisturizing cream to all of the skin as above.    3. Additional  recommendation:      Use unscented hypoallergenic detergent for your clothes, avoid softener or dryer sheets unless they are unscented and hypoallergenic (all free and clear; downy free and gentle).    [facility]; [address] [place] [address] Tel: [phone] Fax: [phone]

## 2018-04-30 NOTE — PROGRESS NOTES
Subjective:       Patient ID:  Alexa Otero is a 78 y.o. female who presents for   Chief Complaint   Patient presents with    Spot     Back     Lesion     scalp     Patient last seen 2/2018 with AKs treated with cryo and benign lesions  New additional lesions concerning to patient developed since then        Spot  - Initial  Affected locations: back  Duration: few months.  Signs / symptoms: itching  Severity: mild  Timing: constant  Aggravated by: nothing  Relieving factors/Treatments tried: nothing    Lesion  - Initial  Affected locations: scalp  Duration: years.  Signs / symptoms: asymptomatic  Severity: mild  Timing: constant  Aggravated by: nothing  Relieving factors/Treatments tried: nothing        Review of Systems   Constitutional: Negative for fever, chills, weight loss, weight gain, fatigue, night sweats and malaise.   Skin: Positive for daily sunscreen use and activity-related sunscreen use. Negative for wears hat.   Hematologic/Lymphatic: Does not bruise/bleed easily.        Objective:    Physical Exam   Constitutional: She appears well-developed and well-nourished. No distress.   Neurological: She is alert and oriented to person, place, and time. She is not disoriented.   Psychiatric: She has a normal mood and affect.   Skin:   Areas Examined (abnormalities noted in diagram):   Scalp / Hair Palpated and Inspected  Head / Face Inspection Performed  Neck Inspection Performed  Back Inspection Performed                       Diagram Legend     Erythematous scaling macule/papule c/w actinic keratosis       Vascular papule c/w angioma      Pigmented verrucoid papule/plaque c/w seborrheic keratosis      Yellow umbilicated papule c/w sebaceous hyperplasia      Irregularly shaped tan macule c/w lentigo     1-2 mm smooth white papules consistent with Milia      Movable subcutaneous cyst with punctum c/w epidermal inclusion cyst      Subcutaneous movable cyst c/w pilar cyst      Firm pink to brown papule c/w  dermatofibroma      Pedunculated fleshy papule(s) c/w skin tag(s)      Evenly pigmented macule c/w junctional nevus     Mildly variegated pigmented, slightly irregular-bordered macule c/w mildly atypical nevus      Flesh colored to evenly pigmented papule c/w intradermal nevus       Pink pearly papule/plaque c/w basal cell carcinoma      Erythematous hyperkeratotic cursted plaque c/w SCC      Surgical scar with no sign of skin cancer recurrence      Open and closed comedones      Inflammatory papules and pustules      Verrucoid papule consistent consistent with wart     Erythematous eczematous patches and plaques     Dystrophic onycholytic nail with subungual debris c/w onychomycosis     Umbilicated papule    Erythematous-base heme-crusted tan verrucoid plaque consistent with inflamed seborrheic keratosis     Erythematous Silvery Scaling Plaque c/w Psoriasis     See annotation      Assessment / Plan:        Inflamed seborrheic keratosis, back x 2, itchy, bothersome, requests tx  Cryosurgery procedure note:    Verbal consent from the patient is obtained. Liquid nitrogen cryosurgery is applied to 2 lesions to produce a freeze injury. The patient is aware that blisters may form and is instructed on wound care with gentle cleansing and use of vaseline ointment to keep moist until healed. The patient is supplied a handout on cryosurgery and is instructed to call if lesions do not completely resolve.      Seborrheic keratoses, multiple, scalp  Reassurance    These are benign inherited growths without a malignant potential. Reassurance given to patient. No treatment is necessary.     Patient instructed in importance in daily sun protection of at least spf 30. Sun avoidance and topical protection discussed.   Recommend Elta MD (physician office) COTZ sensitive (available online) for daily use on face and neck.  Patient encouraged to wear hat for all outdoor exposure.   Also discussed sun protective clothing.                Follow-up if symptoms worsen or fail to improve.

## 2018-05-24 ENCOUNTER — INITIAL CONSULT (OUTPATIENT)
Dept: GASTROENTEROLOGY | Facility: CLINIC | Age: 78
End: 2018-05-24
Payer: MEDICARE

## 2018-05-24 VITALS
BODY MASS INDEX: 27.07 KG/M2 | SYSTOLIC BLOOD PRESSURE: 117 MMHG | HEIGHT: 63 IN | HEART RATE: 97 BPM | WEIGHT: 152.75 LBS | DIASTOLIC BLOOD PRESSURE: 70 MMHG

## 2018-05-24 DIAGNOSIS — Z87.19 HISTORY OF BARRETT'S ESOPHAGUS: ICD-10-CM

## 2018-05-24 DIAGNOSIS — Z86.010 HISTORY OF COLON POLYPS: ICD-10-CM

## 2018-05-24 DIAGNOSIS — K21.9 GASTROESOPHAGEAL REFLUX DISEASE, ESOPHAGITIS PRESENCE NOT SPECIFIED: Primary | ICD-10-CM

## 2018-05-24 PROCEDURE — 99204 OFFICE O/P NEW MOD 45 MIN: CPT | Mod: S$GLB,,, | Performed by: INTERNAL MEDICINE

## 2018-05-24 PROCEDURE — 99999 PR PBB SHADOW E&M-EST. PATIENT-LVL III: CPT | Mod: PBBFAC,,, | Performed by: INTERNAL MEDICINE

## 2018-05-24 PROCEDURE — 3078F DIAST BP <80 MM HG: CPT | Mod: S$GLB,,, | Performed by: INTERNAL MEDICINE

## 2018-05-24 PROCEDURE — 3074F SYST BP LT 130 MM HG: CPT | Mod: S$GLB,,, | Performed by: INTERNAL MEDICINE

## 2018-05-24 NOTE — LETTER
May 24, 2018      Idalia Greco MD  [address]  [place] [address]           [facility] [facility]  [address]  [place] [address]  Phone: [phone]          Patient: Alexa Otero   MR Number: [id number]   YOB: 1940   Date of Visit: 5/24/2018       Dear Dr. Idalia Greco:    Thank you for referring Alexa Otero to me for evaluation. Attached you will find relevant portions of my assessment and plan of care.    If you have questions, please do not hesitate to call me. I look forward to following Alexa Otero along with you.    Sincerely,    Sabino Stephenson MD    Enclosure  CC:  No Recipients    If you would like to receive this communication electronically, please contact [email or web address] or [phone] to request more information on [employer] Link access.    For providers and/or their staff who would like to refer a patient to [facility], please contact us through our one-stop-shop provider referral line, [facility], at [phone].    If you feel you have received this communication in error or would no longer like to receive these types of communications, please e-mail [email or web address]

## 2018-05-24 NOTE — PROGRESS NOTES
"Subjective:       Patient ID: Alexa Otero is a 78 y.o. female.    This patient is new to me.  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      Chief Complaint: Kwon's esophagus    Patient seen for Kwon's esophagus, onset in  per notes from Dr. Jimenez which were reviewed, short segment with focal HGD, treated with HALO and Stretta by Dr. Samuel, with subsequent EGD showing no residual Kwon's in 2016, with no current symptoms and no alleviating/exacerbating factors.  She states that she was told she would not need further EGD but she would feel better with continued surveillance given that she had a family member that  of esophageal cancer.  She had colon polyps in the past and last adenoma was noted 3 years ago.      Review of Systems   Constitutional: Negative for chills, fatigue and fever.   HENT: Negative for sore throat and trouble swallowing.    Respiratory: Negative for cough, shortness of breath and wheezing.    Cardiovascular: Negative for chest pain and palpitations.   Gastrointestinal: Negative for abdominal pain, blood in stool, nausea and vomiting.   Genitourinary: Negative for dysuria and hematuria.   Musculoskeletal: Negative for arthralgias and myalgias.   Skin: Negative for color change and rash.   Neurological: Negative for dizziness and headaches.   Hematological: Negative for adenopathy.   Psychiatric/Behavioral: Negative for confusion. The patient is not nervous/anxious.    All other systems reviewed and are negative.      Objective:       Vitals:    18 1506   BP: 117/70   Pulse: 97   Weight: 69.3 kg (152 lb 12.5 oz)   Height: 5' 3" (1.6 m)       Physical Exam   Constitutional: She appears well-developed and well-nourished.   HENT:   Head: Normocephalic and atraumatic.   Eyes: Pupils are equal, round, and reactive to light. No scleral icterus.   Neck: Normal range of motion.   Cardiovascular: Normal rate and regular rhythm.    No murmur heard.  Pulmonary/Chest: " Effort normal and breath sounds normal. She has no wheezes.   Abdominal: Soft. Bowel sounds are normal. She exhibits no distension. There is no tenderness.   Musculoskeletal: She exhibits no edema or tenderness.   Lymphadenopathy:     She has no cervical adenopathy.   Neurological: She is alert.   Skin: Skin is warm and dry. No rash noted.         Lab Results   Component Value Date    WBC 9.05 04/17/2018    HGB 13.4 04/17/2018    HCT 41.5 04/17/2018    MCV 90 04/17/2018     04/17/2018       CMP  Sodium   Date Value Ref Range Status   04/17/2018 142 136 - 145 mmol/L Final     Potassium   Date Value Ref Range Status   04/17/2018 3.9 3.5 - 5.1 mmol/L Final     Chloride   Date Value Ref Range Status   04/17/2018 103 95 - 110 mmol/L Final     CO2   Date Value Ref Range Status   04/17/2018 26 23 - 29 mmol/L Final     Glucose   Date Value Ref Range Status   04/17/2018 119 (H) 70 - 110 mg/dL Final     BUN, Bld   Date Value Ref Range Status   04/17/2018 24 (H) 8 - 23 mg/dL Final     Creatinine   Date Value Ref Range Status   04/17/2018 0.9 0.5 - 1.4 mg/dL Final     Calcium   Date Value Ref Range Status   04/17/2018 10.2 8.7 - 10.5 mg/dL Final     Total Protein   Date Value Ref Range Status   04/17/2018 7.2 6.0 - 8.4 g/dL Final     Albumin   Date Value Ref Range Status   04/17/2018 3.9 3.5 - 5.2 g/dL Final     Total Bilirubin   Date Value Ref Range Status   04/17/2018 0.6 0.1 - 1.0 mg/dL Final     Comment:     For infants and newborns, interpretation of results should be based  on gestational age, weight and in agreement with clinical  observations.  Premature Infant recommended reference ranges:  Up to 24 hours.............<8.0 mg/dL  Up to 48 hours............<12.0 mg/dL  3-5 days..................<15.0 mg/dL  6-29 days.................<15.0 mg/dL       Alkaline Phosphatase   Date Value Ref Range Status   04/17/2018 45 (L) 55 - 135 U/L Final     AST   Date Value Ref Range Status   04/17/2018 20 10 - 40 U/L Final      ALT   Date Value Ref Range Status   04/17/2018 22 10 - 44 U/L Final     Anion Gap   Date Value Ref Range Status   04/17/2018 13 8 - 16 mmol/L Final     eGFR if    Date Value Ref Range Status   04/17/2018 >60.0 >60 mL/min/1.73 m^2 Final     eGFR if non    Date Value Ref Range Status   04/17/2018 >60.0 >60 mL/min/1.73 m^2 Final     Comment:     Calculation used to obtain the estimated glomerular filtration  rate (eGFR) is the CKD-EPI equation.          Images from scope with Dr. Samuel were independently visualized and reviewed by me and showed short segment Kwon's status post Stretta and HALO.      Old records from Dr. Jimenez reviewed and are as summarized above in the HPI.    Assessment:       1. Gastroesophageal reflux disease, esophagitis presence not specified    2. History of Kwon's esophagus    3. History of colon polyps        Plan:       1.  Colonoscopy in 2 years for polyp surveillance  2.  Antireflux precautions including avoidance of late night eating and lying down soon after eating.     3.  Continue PPI  4.  Discussed risks and benefits of EGD and patient agrees to procedure.  Will schedule  5.  Further recommendations to follow after above.  6.  Communication will be sent to the referring MD, Dr. Greco regarding my assessment and plan on this patient via EPIC.

## 2018-06-20 ENCOUNTER — SURGERY (OUTPATIENT)
Age: 78
End: 2018-06-20

## 2018-06-20 ENCOUNTER — ANESTHESIA (OUTPATIENT)
Dept: ENDOSCOPY | Facility: HOSPITAL | Age: 78
End: 2018-06-20
Payer: MEDICARE

## 2018-06-20 ENCOUNTER — HOSPITAL ENCOUNTER (OUTPATIENT)
Facility: HOSPITAL | Age: 78
Discharge: HOME | End: 2018-06-20
Attending: INTERNAL MEDICINE | Admitting: INTERNAL MEDICINE
Payer: MEDICARE

## 2018-06-20 ENCOUNTER — ANESTHESIA EVENT (OUTPATIENT)
Dept: ENDOSCOPY | Facility: HOSPITAL | Age: 78
End: 2018-06-20
Payer: MEDICARE

## 2018-06-20 DIAGNOSIS — K44.9 HIATAL HERNIA: Primary | ICD-10-CM

## 2018-06-20 DIAGNOSIS — K22.70 BARRETT'S ESOPHAGUS: ICD-10-CM

## 2018-06-20 DIAGNOSIS — K31.7 GASTRIC POLYP: ICD-10-CM

## 2018-06-20 DIAGNOSIS — K29.70 GASTRITIS, PRESENCE OF BLEEDING UNSPECIFIED, UNSPECIFIED CHRONICITY, UNSPECIFIED GASTRITIS TYPE: ICD-10-CM

## 2018-06-20 PROCEDURE — 63600175 PHARM REV CODE 636 W HCPCS: Performed by: NURSE ANESTHETIST, CERTIFIED REGISTERED

## 2018-06-20 PROCEDURE — 27201012 HC FORCEPS, HOT/COLD, DISP: Performed by: INTERNAL MEDICINE

## 2018-06-20 PROCEDURE — D9220A PRA ANESTHESIA: Mod: CRNA,,, | Performed by: NURSE ANESTHETIST, CERTIFIED REGISTERED

## 2018-06-20 PROCEDURE — 43239 EGD BIOPSY SINGLE/MULTIPLE: CPT | Performed by: INTERNAL MEDICINE

## 2018-06-20 PROCEDURE — D9220A PRA ANESTHESIA: Mod: ANES,,, | Performed by: ANESTHESIOLOGY

## 2018-06-20 PROCEDURE — 43239 EGD BIOPSY SINGLE/MULTIPLE: CPT | Mod: ,,, | Performed by: INTERNAL MEDICINE

## 2018-06-20 PROCEDURE — 88305 TISSUE EXAM BY PATHOLOGIST: CPT | Mod: 26,,, | Performed by: PATHOLOGY

## 2018-06-20 PROCEDURE — 25000003 PHARM REV CODE 250: Performed by: INTERNAL MEDICINE

## 2018-06-20 PROCEDURE — 37000008 HC ANESTHESIA 1ST 15 MINUTES: Performed by: INTERNAL MEDICINE

## 2018-06-20 PROCEDURE — 88305 TISSUE EXAM BY PATHOLOGIST: CPT | Performed by: PATHOLOGY

## 2018-06-20 RX ORDER — SODIUM CHLORIDE 9 MG/ML
INJECTION, SOLUTION INTRAVENOUS CONTINUOUS
Status: DISCONTINUED | OUTPATIENT
Start: 2018-06-20 | End: 2018-06-20 | Stop reason: HOSPADM

## 2018-06-20 RX ORDER — LIDOCAINE HCL/PF 100 MG/5ML
SYRINGE (ML) INTRAVENOUS
Status: DISCONTINUED | OUTPATIENT
Start: 2018-06-20 | End: 2018-06-20

## 2018-06-20 RX ORDER — PROPOFOL 10 MG/ML
VIAL (ML) INTRAVENOUS
Status: DISCONTINUED | OUTPATIENT
Start: 2018-06-20 | End: 2018-06-20

## 2018-06-20 RX ADMIN — PROPOFOL 80 MG: 10 INJECTION, EMULSION INTRAVENOUS at 10:06

## 2018-06-20 RX ADMIN — SODIUM CHLORIDE: 0.9 INJECTION, SOLUTION INTRAVENOUS at 09:06

## 2018-06-20 RX ADMIN — PROPOFOL 40 MG: 10 INJECTION, EMULSION INTRAVENOUS at 10:06

## 2018-06-20 RX ADMIN — LIDOCAINE HYDROCHLORIDE 50 MG: 20 INJECTION, SOLUTION INTRAVENOUS at 10:06

## 2018-06-20 NOTE — TRANSFER OF CARE
Anesthesia Transfer of Care Note    Patient: Alexa Otero    Procedure(s) Performed: Procedure(s) (LRB):  EGD (ESOPHAGOGASTRODUODENOSCOPY) (N/A)    Patient location: PACU    Anesthesia Type: general    Transport from OR: Transported from OR on 2-3 L/min O2 by NC with adequate spontaneous ventilation    Post pain: adequate analgesia    Post assessment: no apparent anesthetic complications and tolerated procedure well    Post vital signs: stable    Level of consciousness: awake, alert and oriented    Nausea/Vomiting: no nausea/vomiting    Complications: none    Transfer of care protocol was followed      Last vitals:   Visit Vitals  BP (!) 175/85 (BP Location: Left arm, Patient Position: Lying)   Pulse 83   Temp 37.1 °C (98.7 °F) (Tympanic)   Resp 19   SpO2 98%   Breastfeeding? No

## 2018-06-20 NOTE — [employer] PATIENT INSTRUCTIONS
Discharge Summary/Instructions after an Endoscopic Procedure  Patient Name: Alexa Otero  Patient MRN: [id number]  Patient YOB: 1940 Wednesday, June 20, 2018  Sabino Dasilva MD  RESTRICTIONS:  During your procedure today, you received medications for sedation.  These   medications may affect your judgment, balance and coordination.  Therefore,   for 24 hours, you have the following restrictions:   - DO NOT drive a car, operate machinery, make legal/financial decisions,   sign important papers or drink alcohol.    ACTIVITY:  Today: no heavy lifting, straining or running due to procedural   sedation/anesthesia.  The following day: return to full activity including work.  DIET:  Eat and drink normally unless instructed otherwise.     TREATMENT FOR COMMON SIDE EFFECTS:  - Mild abdominal pain, nausea, belching, bloating or excessive gas:  rest,   eat lightly and use a heating pad.  - Sore Throat: treat with throat lozenges and/or gargle with warm salt   water.  - Because air was used during the procedure, expelling large amounts of air   from your rectum or belching is normal.  - If a bowel prep was taken, you may not have a bowel movement for 1-3 days.    This is normal.  SYMPTOMS TO WATCH FOR AND REPORT TO YOUR PHYSICIAN:  1. Abdominal pain or bloating, other than gas cramps.  2. Chest pain.  3. Back pain.  4. Signs of infection such as: chills or fever occurring within 24 hours   after the procedure.  5. Rectal bleeding, which would show as bright red, maroon, or black stools.   (A tablespoon of blood from the rectum is not serious, especially if   hemorrhoids are present.)  6. Vomiting.  7. Weakness or dizziness.  GO DIRECTLY TO THE NEAREST EMERGENCY ROOM IF YOU HAVE ANY OF THE FOLLOWING:      Difficulty breathing              Chills and/or fever over 101 F   Persistent vomiting and/or vomiting blood   Severe abdominal pain   Severe chest pain   Black, tarry stools   Bleeding- more than one  tablespoon   Any other symptom or condition that you feel may need urgent attention  Your doctor recommends these additional instructions:  If any biopsies were taken, your doctors clinic will contact you in 1 to 2   weeks with any results.  - Patient has a contact number available for emergencies.  The signs and   symptoms of potential delayed complications were discussed with the   patient.  Return to normal activities tomorrow.  Written discharge   instructions were provided to the patient.   - Resume previous diet.   - No aspirin, ibuprofen, naproxen, or other non-steroidal anti-inflammatory   drugs.   - Await pathology results.   - Repeat upper endoscopy (date not yet determined) for surveillance.   - Discharge patient to home (ambulatory).   - Follow an antireflux regimen.   - Return to my office after studies are complete.  For questions, problems or results please call your physician - Sabino Dasilva MD at Work:  [phone].  [facility], EMERGENCY ROOM PHONE NUMBER: [phone]  IF A COMPLICATION OR EMERGENCY SITUATION ARISES AND YOU ARE UNABLE TO REACH   YOUR PHYSICIAN - GO DIRECTLY TO THE EMERGENCY ROOM.  Sabino Dasilva MD  6/20/2018 10:11:06 AM  This report has been verified and signed electronically.  [employer]

## 2018-06-20 NOTE — DISCHARGE INSTRUCTIONS
Gastritis (Adult)    Gastritis is inflammation and irritation of the stomach lining. It can be present for a short time (acute) or be long lasting (chronic). Gastritis is often caused by infection with bacteria called H pylori. More than a third of people in the [place] have this bacteria in their bodies. In many cases, H pylori causes no problems or symptoms. In some people, though, the infection irritates the stomach lining and causes gastritis. Other causes of stomach irritation include drinking alcohol or taking pain-relieving medicines called NSAIDs (such as aspirin or ibuprofen).   Symptoms of gastritis can include:  · Abdominal pain or bloating  · Loss of appetite  · Nausea or vomiting  · Vomiting blood or having black stools  · Feeling more tired than usual  An inflamed and irritated stomach lining is more likely to develop a sore called an ulcer. To help prevent this, gastritis should be treated.  Home care  If needed, medicines may be prescribed. If you have H pylori infection, treating it will likely relieve your symptoms. Other changes can help reduce stomach irritation and help it heal.  · If you have been prescribed medicines for H pylori infection, take them as directed. Take all of the medicine until it is finished or your healthcare provider tells you to stop, even if you feel better.  · Your healthcare provider may recommend avoiding NSAIDs. If you take daily aspirin for your heart or other medical reasons, do not stop without talking to your healthcare provider first.  · Avoid drinking alcohol.  · Stop smoking. Smoking can irritate the stomach and delay healing. As much as possible, stay away from second hand smoke.  Follow-up care  Follow up with your healthcare provider, or as advised by our staff. Testing may be needed to check for inflammation or an ulcer.  When to seek medical advice  Call your healthcare provider for any of the following:  · Stomach pain that gets worse or moves to the lower  right abdomen (appendix area)  · Chest pain that appears or gets worse, or spreads to the back, neck, shoulder, or arm  · Frequent vomiting (cant keep down liquids)  · Blood in the stool or vomit (red or black in color)  · Feeling weak or dizzy  · Fever of 100.4ºF (38ºC) or higher, or as directed by your healthcare provider  Date Last Reviewed: 6/22/2015 © 2000-2017 [employer]. [address] [place] [address]. All rights reserved. This information is not intended as a substitute for professional medical care. Always follow your healthcare professional's instructions.        What Is a Hiatal Hernia?    Hiatal hernia is when the area where the stomach and esophagus meet bulges up through the diaphragm into the chest cavity. In some cases, part of the stomach may bulge above the diaphragm. Stomach acid may move up into the esophagus and cause symptoms. The symptoms are often blamed on gastroesophageal reflux disease (GERD). You may only know about the hernia when it shows up on an X-ray taken for other reasons.   What you may feel  The hiatus is a normal hole in the diaphragm. The esophagus passes through this hole and leads to the stomach. In some cases, part of the stomach may bulge above the diaphragm. This bulge is called a hernia. Stomach acid may move up into the esophagus and cause symptoms.  When you eat, the muscle at the hiatus relaxes to allow food to pass into the stomach. It tightens again to keep food and digestive acids in the stomach.  Many people with hiatal hernias have mild symptoms. You may notice the following GERD symptoms:  · Heartburn or other chest discomfort  · A feeling of chest fullness after a meal  · Frequent burping  · Acid taste in the mouth  · Trouble swallowing  Treating symptoms  If you have been diagnosed with hiatal hernia, these suggestions may help improve symptoms:  · Lose excess weight. Extra weight puts pressure on the stomach and esophagus.  · Dont  lie down after eating. Sit up for at least an hour after eating. Lying down after eating can increase symptoms.  · Avoid certain foods and drinks. These include fatty foods, chocolate, coffee, mint, and other foods that cause symptoms for you.  · Dont smoke or drink alcohol. These can worsen symptoms.  · Look at your medicines. Discuss your medicines with your healthcare provider. Many medicines can cause symptoms.  · Consider an antacid medicine. Ask your healthcare provider about over-the-counter and prescription medicines that may help.  · Ask about surgery, if needed. Surgery is a treatment choice for some people. Your healthcare provider can determine if surgery is an option for you.    Date Last Reviewed: 10/1/2016  © 0099-1654 [employer]. [address] [place] [address], [place] [address]. All rights reserved. This information is not intended as a substitute for professional medical care. Always follow your healthcare professional's instructions.        Upper GI Endoscopy     During endoscopy, a long, flexible tube is used to view the inside of your upper GI tract.      Upper GI endoscopy allows your healthcare provider to look directly into the beginning of your gastrointestinal (GI) tract. The esophagus, stomach, and duodenum (the first part of the small intestine) make up the upper GI tract.   Before the exam  Follow these and any other instructions you are given before your endoscopy. If you dont follow the healthcare providers instructions carefully, the test may need to be canceled or done over:  · Don't eat or drink anything after midnight the night before your exam. If your exam is in the afternoon, drink only clear liquids in the morning. Don't eat or drink anything for 8 hours before the exam. In some cases, you may be able to take medicines with sips of water until 2 hours before the procedure. Speak with your healthcare provider about this.   · Bring your X-rays and any other test results  you have.  · Because you will be sedated, arrange for an adult to drive you home after the exam.  · Tell your healthcare provider before the exam if you are taking any medicines or have any medical problems.  The procedure  Here is what to expect:  · You will lie on the endoscopy table. Usually patients lie on the left side.  · You will be monitored and given oxygen.  · Your throat may be numbed with a spray or gargle. You are given medicine through an intravenous (IV) line that will help you relax and remain comfortable. You may be awake or asleep during the procedure.  · The healthcare provider will put the endoscope in your mouth and down your esophagus. It is thinner than most pieces of food that you swallow. It will not affect your breathing. The medicine helps keep you from gagging.  · Air is put into your GI tract to expand it. It can make you burp.  · During the procedure, the healthcare provider can take biopsies (tissue samples), remove abnormalities, such as polyps, or treat abnormalities through a variety of devices placed through the endoscope. You will not feel this.   · The endoscope carries images of your upper GI tract to a video screen. If you are awake, you may be able to look at the images.  · After the procedure is done, you will rest for a time. An adult must drive you home.  When to call your healthcare provider  Contact your healthcare provider if you have:  · Black or tarry stools, or blood in your stool  · Fever  · Pain in your belly that does not go away  · Nausea and vomiting, or vomiting blood   Date Last Reviewed: 7/1/2016  © 7558-6539 [employer]. [address] [place] [address], [place] [address]. All rights reserved. This information is not intended as a substitute for professional medical care. Always follow your healthcare professional's instructions.

## 2018-06-20 NOTE — ANESTHESIA PREPROCEDURE EVALUATION
06/20/2018  Alexa Otero is a 78 y.o., female.    Anesthesia Evaluation    I have reviewed the Patient Summary Reports.    I have reviewed the Nursing Notes.   I have reviewed the Medications.     Review of Systems  Anesthesia Hx:  Denies Family Hx of Anesthesia complications.   Denies Personal Hx of Anesthesia complications.   Cardiovascular:   Hypertension    Hepatic/GI:   GERD    Musculoskeletal:   Arthritis     Endocrine:   Diabetes, type 2        Physical Exam  General:  Well nourished    Airway/Jaw/Neck:  Airway Findings: Mouth Opening: Normal Tongue: Normal  General Airway Assessment: Adult  Mallampati: III  Improves to II with phonation.  TM Distance: Normal, at least 6 cm                 Anesthesia Plan  Type of Anesthesia, risks & benefits discussed:  Anesthesia Type:  general  Patient's Preference:   Intra-op Monitoring Plan: standard ASA monitors  Intra-op Monitoring Plan Comments:   Post Op Pain Control Plan:   Post Op Pain Control Plan Comments:   Induction:   IV  Beta Blocker:  Patient is not currently on a Beta-Blocker (No further documentation required).       Informed Consent: Patient understands risks and agrees with Anesthesia plan.  Questions answered. Anesthesia consent signed with patient.  ASA Score: 2     Day of Surgery Review of History & Physical:    H&P update referred to the provider.         Ready For Surgery From Anesthesia Perspective.

## 2018-06-21 VITALS
RESPIRATION RATE: 16 BRPM | OXYGEN SATURATION: 96 % | HEART RATE: 76 BPM | SYSTOLIC BLOOD PRESSURE: 143 MMHG | DIASTOLIC BLOOD PRESSURE: 72 MMHG | TEMPERATURE: 98 F

## 2018-07-02 DIAGNOSIS — I10 ESSENTIAL HYPERTENSION: ICD-10-CM

## 2018-07-02 RX ORDER — BENAZEPRIL HYDROCHLORIDE 40 MG/1
40 TABLET ORAL DAILY
Qty: 90 TABLET | Refills: 3 | Status: CANCELLED | OUTPATIENT
Start: 2018-07-02

## 2018-07-02 RX ORDER — SIMVASTATIN 40 MG/1
40 TABLET, FILM COATED ORAL NIGHTLY
Qty: 90 TABLET | Refills: 3 | Status: CANCELLED | OUTPATIENT
Start: 2018-07-02

## 2018-09-24 ENCOUNTER — TELEPHONE (OUTPATIENT)
Dept: ADMINISTRATIVE | Facility: HOSPITAL | Age: 78
End: 2018-09-24

## 2018-10-05 ENCOUNTER — LAB VISIT (OUTPATIENT)
Dept: LAB | Facility: HOSPITAL | Age: 78
End: 2018-10-05
Attending: FAMILY MEDICINE
Payer: MEDICARE

## 2018-10-05 DIAGNOSIS — E11.9 CONTROLLED TYPE 2 DIABETES MELLITUS WITHOUT COMPLICATION, WITHOUT LONG-TERM CURRENT USE OF INSULIN: ICD-10-CM

## 2018-10-05 DIAGNOSIS — D75.1 POLYCYTHEMIA, SECONDARY: ICD-10-CM

## 2018-10-05 DIAGNOSIS — E78.5 HYPERLIPIDEMIA, UNSPECIFIED HYPERLIPIDEMIA TYPE: ICD-10-CM

## 2018-10-05 DIAGNOSIS — E55.9 VITAMIN D DEFICIENCY DISEASE: ICD-10-CM

## 2018-10-05 LAB
25(OH)D3+25(OH)D2 SERPL-MCNC: 44 NG/ML
ALBUMIN SERPL BCP-MCNC: 3.9 G/DL
ALP SERPL-CCNC: 41 U/L
ALT SERPL W/O P-5'-P-CCNC: 16 U/L
ANION GAP SERPL CALC-SCNC: 8 MMOL/L
AST SERPL-CCNC: 17 U/L
BASOPHILS # BLD AUTO: 0.05 K/UL
BASOPHILS NFR BLD: 0.7 %
BILIRUB SERPL-MCNC: 0.6 MG/DL
BUN SERPL-MCNC: 19 MG/DL
CALCIUM SERPL-MCNC: 9.9 MG/DL
CHLORIDE SERPL-SCNC: 103 MMOL/L
CHOLEST SERPL-MCNC: 185 MG/DL
CHOLEST/HDLC SERPL: 3.9 {RATIO}
CO2 SERPL-SCNC: 31 MMOL/L
CREAT SERPL-MCNC: 0.9 MG/DL
DIFFERENTIAL METHOD: NORMAL
EOSINOPHIL # BLD AUTO: 0.2 K/UL
EOSINOPHIL NFR BLD: 2.7 %
ERYTHROCYTE [DISTWIDTH] IN BLOOD BY AUTOMATED COUNT: 13.2 %
EST. GFR  (AFRICAN AMERICAN): >60 ML/MIN/1.73 M^2
EST. GFR  (NON AFRICAN AMERICAN): >60 ML/MIN/1.73 M^2
ESTIMATED AVG GLUCOSE: 126 MG/DL
GLUCOSE SERPL-MCNC: 111 MG/DL
HBA1C MFR BLD HPLC: 6 %
HCT VFR BLD AUTO: 40.6 %
HDLC SERPL-MCNC: 47 MG/DL
HDLC SERPL: 25.4 %
HGB BLD-MCNC: 13.2 G/DL
IMM GRANULOCYTES # BLD AUTO: 0.02 K/UL
IMM GRANULOCYTES NFR BLD AUTO: 0.3 %
LDLC SERPL CALC-MCNC: 119.4 MG/DL
LYMPHOCYTES # BLD AUTO: 2.1 K/UL
LYMPHOCYTES NFR BLD: 29.4 %
MCH RBC QN AUTO: 29.3 PG
MCHC RBC AUTO-ENTMCNC: 32.5 G/DL
MCV RBC AUTO: 90 FL
MONOCYTES # BLD AUTO: 0.6 K/UL
MONOCYTES NFR BLD: 9 %
NEUTROPHILS # BLD AUTO: 4.1 K/UL
NEUTROPHILS NFR BLD: 57.9 %
NONHDLC SERPL-MCNC: 138 MG/DL
NRBC BLD-RTO: 0 /100 WBC
PLATELET # BLD AUTO: 271 K/UL
PMV BLD AUTO: 10.3 FL
POTASSIUM SERPL-SCNC: 3.9 MMOL/L
PROT SERPL-MCNC: 7 G/DL
RBC # BLD AUTO: 4.51 M/UL
SODIUM SERPL-SCNC: 142 MMOL/L
TRIGL SERPL-MCNC: 93 MG/DL
WBC # BLD AUTO: 7.14 K/UL

## 2018-10-05 PROCEDURE — 80061 LIPID PANEL: CPT

## 2018-10-05 PROCEDURE — 82306 VITAMIN D 25 HYDROXY: CPT

## 2018-10-05 PROCEDURE — 80053 COMPREHEN METABOLIC PANEL: CPT

## 2018-10-05 PROCEDURE — 83036 HEMOGLOBIN GLYCOSYLATED A1C: CPT

## 2018-10-05 PROCEDURE — 85025 COMPLETE CBC W/AUTO DIFF WBC: CPT

## 2018-10-05 PROCEDURE — 36415 COLL VENOUS BLD VENIPUNCTURE: CPT | Mod: PO

## 2018-10-08 ENCOUNTER — OFFICE VISIT (OUTPATIENT)
Dept: FAMILY MEDICINE | Facility: CLINIC | Age: 78
End: 2018-10-08
Payer: MEDICARE

## 2018-10-08 VITALS
WEIGHT: 152.31 LBS | TEMPERATURE: 98 F | OXYGEN SATURATION: 96 % | HEIGHT: 63 IN | BODY MASS INDEX: 26.99 KG/M2 | SYSTOLIC BLOOD PRESSURE: 135 MMHG | HEART RATE: 87 BPM | RESPIRATION RATE: 14 BRPM | DIASTOLIC BLOOD PRESSURE: 82 MMHG

## 2018-10-08 DIAGNOSIS — E78.5 HYPERLIPIDEMIA ASSOCIATED WITH TYPE 2 DIABETES MELLITUS: ICD-10-CM

## 2018-10-08 DIAGNOSIS — E55.9 VITAMIN D DEFICIENCY DISEASE: ICD-10-CM

## 2018-10-08 DIAGNOSIS — D75.1 POLYCYTHEMIA, SECONDARY: Primary | ICD-10-CM

## 2018-10-08 DIAGNOSIS — I10 ESSENTIAL HYPERTENSION: ICD-10-CM

## 2018-10-08 DIAGNOSIS — E11.9 CONTROLLED TYPE 2 DIABETES MELLITUS WITHOUT COMPLICATION, WITHOUT LONG-TERM CURRENT USE OF INSULIN: ICD-10-CM

## 2018-10-08 DIAGNOSIS — E11.69 HYPERLIPIDEMIA ASSOCIATED WITH TYPE 2 DIABETES MELLITUS: ICD-10-CM

## 2018-10-08 DIAGNOSIS — M48.061 SPINAL STENOSIS OF LUMBAR REGION, UNSPECIFIED WHETHER NEUROGENIC CLAUDICATION PRESENT: ICD-10-CM

## 2018-10-08 PROCEDURE — 99214 OFFICE O/P EST MOD 30 MIN: CPT | Mod: PBBFAC,PO | Performed by: FAMILY MEDICINE

## 2018-10-08 PROCEDURE — 1101F PT FALLS ASSESS-DOCD LE1/YR: CPT | Mod: ,,, | Performed by: FAMILY MEDICINE

## 2018-10-08 PROCEDURE — 3075F SYST BP GE 130 - 139MM HG: CPT | Mod: ,,, | Performed by: FAMILY MEDICINE

## 2018-10-08 PROCEDURE — 99214 OFFICE O/P EST MOD 30 MIN: CPT | Mod: S$PBB,,, | Performed by: FAMILY MEDICINE

## 2018-10-08 PROCEDURE — 99999 PR PBB SHADOW E&M-EST. PATIENT-LVL IV: CPT | Mod: PBBFAC,,, | Performed by: FAMILY MEDICINE

## 2018-10-08 PROCEDURE — 3079F DIAST BP 80-89 MM HG: CPT | Mod: ,,, | Performed by: FAMILY MEDICINE

## 2018-10-08 RX ORDER — METHYLPREDNISOLONE 4 MG/1
TABLET ORAL
Qty: 1 PACKAGE | Refills: 0 | Status: SHIPPED | OUTPATIENT
Start: 2018-10-08 | End: 2018-10-29

## 2018-10-08 NOTE — PROGRESS NOTES
Subjective:       Patient ID: Alexa Otero is a 78 y.o. female.    Chief Complaint: Follow-up (6mth f/u hypertension)    HPI  Review of Systems   Constitutional: Negative for fatigue and unexpected weight change.   Respiratory: Negative for chest tightness and shortness of breath.    Cardiovascular: Negative for chest pain, palpitations and leg swelling.   Gastrointestinal: Negative for abdominal pain.   Musculoskeletal: Positive for back pain and myalgias. Negative for arthralgias.   Neurological: Negative for dizziness, syncope, light-headedness and headaches.       Patient Active Problem List   Diagnosis    Sciatica    Syncope and collapse    Polycythemia, secondary    Hyperlipemia    Diabetes mellitus type 2, controlled    HTN (hypertension)    Acute chest pain    Vitamin D deficiency disease    Gastroesophageal reflux disease    History of Kwon's esophagus    Pancreatic pseudocyst/cyst    Hepatic cyst    Low back pain radiating to both legs    Primary osteoarthritis of right ankle    Kwon's esophagus     Patient is here for a chronic conditions follow up.    Lab Visit on 10/05/2018   Component Date Value Ref Range Status    Microalbum.,U,Random 10/05/2018 <2.5  ug/mL Final    Creatinine, Random Ur 10/05/2018 58.0  15.0 - 325.0 mg/dL Final    Microalb Creat Ratio 10/05/2018 Unable to calculate  0.0 - 30.0 ug/mg Final   Lab Visit on 10/05/2018   Component Date Value Ref Range Status    Sodium 10/05/2018 142  136 - 145 mmol/L Final    Potassium 10/05/2018 3.9  3.5 - 5.1 mmol/L Final    Chloride 10/05/2018 103  95 - 110 mmol/L Final    CO2 10/05/2018 31* 23 - 29 mmol/L Final    Glucose 10/05/2018 111* 70 - 110 mg/dL Final    BUN, Bld 10/05/2018 19  8 - 23 mg/dL Final    Creatinine 10/05/2018 0.9  0.5 - 1.4 mg/dL Final    Calcium 10/05/2018 9.9  8.7 - 10.5 mg/dL Final    Total Protein 10/05/2018 7.0  6.0 - 8.4 g/dL Final    Albumin 10/05/2018 3.9  3.5 - 5.2 g/dL Final    Total  Bilirubin 10/05/2018 0.6  0.1 - 1.0 mg/dL Final    Alkaline Phosphatase 10/05/2018 41* 55 - 135 U/L Final    AST 10/05/2018 17  10 - 40 U/L Final    ALT 10/05/2018 16  10 - 44 U/L Final    Anion Gap 10/05/2018 8  8 - 16 mmol/L Final    eGFR if African American 10/05/2018 >60.0  >60 mL/min/1.73 m^2 Final    eGFR if non African American 10/05/2018 >60.0  >60 mL/min/1.73 m^2 Final    Hemoglobin A1C 10/05/2018 6.0* 4.0 - 5.6 % Final    Estimated Avg Glucose 10/05/2018 126  68 - 131 mg/dL Final    WBC 10/05/2018 7.14  3.90 - 12.70 K/uL Final    RBC 10/05/2018 4.51  4.00 - 5.40 M/uL Final    Hemoglobin 10/05/2018 13.2  12.0 - 16.0 g/dL Final    Hematocrit 10/05/2018 40.6  37.0 - 48.5 % Final    MCV 10/05/2018 90  82 - 98 fL Final    MCH 10/05/2018 29.3  27.0 - 31.0 pg Final    MCHC 10/05/2018 32.5  32.0 - 36.0 g/dL Final    RDW 10/05/2018 13.2  11.5 - 14.5 % Final    Platelets 10/05/2018 271  150 - 350 K/uL Final    MPV 10/05/2018 10.3  9.2 - 12.9 fL Final    Immature Granulocytes 10/05/2018 0.3  0.0 - 0.5 % Final    Gran # (ANC) 10/05/2018 4.1  1.8 - 7.7 K/uL Final    Immature Grans (Abs) 10/05/2018 0.02  0.00 - 0.04 K/uL Final    Lymph # 10/05/2018 2.1  1.0 - 4.8 K/uL Final    Mono # 10/05/2018 0.6  0.3 - 1.0 K/uL Final    Eos # 10/05/2018 0.2  0.0 - 0.5 K/uL Final    Baso # 10/05/2018 0.05  0.00 - 0.20 K/uL Final    nRBC 10/05/2018 0  0 /100 WBC Final    Gran% 10/05/2018 57.9  38.0 - 73.0 % Final    Lymph% 10/05/2018 29.4  18.0 - 48.0 % Final    Mono% 10/05/2018 9.0  4.0 - 15.0 % Final    Eosinophil% 10/05/2018 2.7  0.0 - 8.0 % Final    Basophil% 10/05/2018 0.7  0.0 - 1.9 % Final    Differential Method 10/05/2018 Automated   Final    Cholesterol 10/05/2018 185  120 - 199 mg/dL Final    Triglycerides 10/05/2018 93  30 - 150 mg/dL Final    HDL 10/05/2018 47  40 - 75 mg/dL Final    LDL Cholesterol 10/05/2018 119.4  63.0 - 159.0 mg/dL Final    HDL/Chol Ratio 10/05/2018 25.4  20.0 - 50.0  % Final    Total Cholesterol/HDL Ratio 10/05/2018 3.9  2.0 - 5.0 Final    Non-HDL Cholesterol 10/05/2018 138  mg/dL Final    Vit D, 25-Hydroxy 10/05/2018 44  30 - 96 ng/mL Final   }  Objective:      Physical Exam   Constitutional: She is oriented to person, place, and time. She appears well-developed and well-nourished.   Cardiovascular: Normal rate, regular rhythm and normal heart sounds.   Pulmonary/Chest: Effort normal and breath sounds normal.   Musculoskeletal: She exhibits no edema.   Neurological: She is alert and oriented to person, place, and time.   Skin: Skin is warm and dry.   Psychiatric: She has a normal mood and affect.   Nursing note and vitals reviewed.      Assessment:       1. Polycythemia, secondary    2. Controlled type 2 diabetes mellitus without complication, without long-term current use of insulin    3. Essential hypertension    4. Vitamin D deficiency disease    5. Hyperlipidemia associated with type 2 diabetes mellitus    6. Spinal stenosis of lumbar region, unspecified whether neurogenic claudication present        Plan:         1. Polycythemia, secondary  Resolved    2. Controlled type 2 diabetes mellitus without complication, without long-term current use of insulin  Controlled on current medications.  Continue current medications.    - CBC auto differential; Future  - Comprehensive metabolic panel; Future  - Hemoglobin A1c; Future    3. Essential hypertension  Controlled on current medications.  Continue current medications.      4. Vitamin D deficiency disease  Controlled on current medications.  Continue current medications.    - Vitamin D; Future    5. Hyperlipidemia associated with type 2 diabetes mellitus  Controlled on current medications.  Continue current medications.    - Lipid panel; Future    6. Spinal stenosis of lumbar region, unspecified whether neurogenic claudication present  Treat  - methylPREDNISolone (MEDROL DOSEPACK) 4 mg tablet; use as directed  Dispense: 1  Package; Refill: 0  - Ambulatory Referral to Physical/Occupational Therapy  Home Care:  1. You may need to stay in bed the first few days. But, as soon as possible, begin sitting or walking to avoid problems with prolonged bed rest (muscle weakness, worsening back stiffness and pain, blood clots in the legs).  2. When in bed, try to find a position of comfort. A firm mattress is best. Try lying flat on your back with pillows under your knees. You can also try lying on your side with your knees bent up towards your chest and a pillow between your knees.  3. Avoid prolonged sitting. This puts more stress on the lower back than standing or walking.  4. During the first two days after injury, apply an ICE PACK to the painful area for 20 minutes every 2-4 hours. This will reduce swelling and pain. HEAT (hot shower, hot bath or heating pad) works well for muscle spasm. You can start with ice, then switch to heat after two days. Some patients feel best alternating ice and heat treatments. Use the one method that feels the best to you.  5. You may use acetaminophen (Tylenol) or ibuprofen (Motrin, Advil) to control pain, unless another pain medicine was prescribed. [NOTE: If you have chronic liver or kidney disease or ever had a stomach ulcer or GI bleeding, talk with your doctor before using these medicines.]  6. Be aware of safe lifting methods and do not lift anything over 15 pounds until all the pain is gone          Time spent with patient: 20 minutes    Patient with be reevaluated in 6 months or sooner prn    Greater than 50% of this visit was spent counseling as described in above documentation:Yes

## 2018-10-23 ENCOUNTER — CLINICAL SUPPORT (OUTPATIENT)
Dept: REHABILITATION | Facility: HOSPITAL | Age: 78
End: 2018-10-23
Attending: FAMILY MEDICINE
Payer: MEDICARE

## 2018-10-23 DIAGNOSIS — M48.061 SPINAL STENOSIS OF LUMBAR REGION, UNSPECIFIED WHETHER NEUROGENIC CLAUDICATION PRESENT: Primary | ICD-10-CM

## 2018-10-23 PROBLEM — M54.50 LOW BACK PAIN: Status: ACTIVE | Noted: 2018-10-23

## 2018-10-23 PROCEDURE — 97110 THERAPEUTIC EXERCISES: CPT | Mod: PN

## 2018-10-23 PROCEDURE — 97161 PT EVAL LOW COMPLEX 20 MIN: CPT | Mod: PN

## 2018-10-23 PROCEDURE — G8978 MOBILITY CURRENT STATUS: HCPCS | Mod: CJ,PN

## 2018-10-23 PROCEDURE — G8979 MOBILITY GOAL STATUS: HCPCS | Mod: CJ,PN

## 2018-10-23 NOTE — PLAN OF CARE
TIME RECORD    Date: 10/23/2018    Start Time:  1205  Stop Time:  1250    PROCEDURES:    TIMED  Procedure Time Min.   There ex Start:1235  Stop:1250     Start:  Stop:     Start:  Stop:     Start:  Stop:          UNTIMED  Procedure Time Min.   eval Start:1205  Stop:1235     Start:  Stop:      Total Timed Minutes:  15  Total Timed Units:  1  Total Untimed Units:  1  Charges Billed/# of units:  2    OUTPATIENT PHYSICAL THERAPY   PATIENT EVALUATION  Onset Date: 05/01/18  Primary Diagnosis:   1. Spinal stenosis of lumbar region, unspecified whether neurogenic claudication present       Treatment Diagnosis: debility due to lbp  Past Medical History:   Diagnosis Date    Arthritis     Cataract     Diabetes mellitus type II     Encounter for blood transfusion     GERD (gastroesophageal reflux disease)     Hyperlipidemia     Hypertension     Macular degeneration     Squamous cell carcinoma 1980's    groin area     Precautions: fall  Prior Therapy: none  Medications: Alexa Otero has a current medication list which includes the following prescription(s): benazepril, hydrochlorothiazide, metformin, methylprednisolone, omeprazole, ranitidine, simvastatin, and vit a/vit c/vit e/zinc/copper.  Nutrition:  Normal  History of Present Illness: reports took a fall back in May of 2018 - since then, c/o intermittent aisha. lbp/tightness (particularly after standing for a while); also reports sciatic type pain at night in her rt LE after lying on her rt side for a while - radiates to behind her knee  Prior Level of Function: Independent  Social History: retired   Place of Residence (Steps/Adaptations): lives w/ spouse in 1-story [facility] home  Functional Deficits Leading to Referral/Nature of Injury: difficulty performing everyday activities  Patient Therapy Goals: decrease c/o pain    Subjective     Alexa Otero states that her intermittent lbp limits her ability to perform her everyday activities.    Pain:  Location:  back   Description: Aching and Dull  Activities Which Increase Pain: Standing  Activities Which Decrease Pain: rest  Pain Scale: 0/10 at best 1/10 now  8/10 at worst    Objective     Posture: guarded during transitional mvt  Palpation: pain w/ palpation to affected area  Sensation: numbness 4th and 5th toes rt foot  DTRs:  Range of Motion/Strength: MMT = grossly 4/5 throughout aisha. LE's; AROM = WFL throughout    Flexibility: mild limitation in l/s  Gait: Without AD  Analysis: WFL  Bed Mobility:Assistance - supervision  Transfers: Independent  Special Tests: Oswestry = 17/50  Other:   Treatment: instructed pt. on log rolling technique - min VC needed; x 20 trunk rotations; x 15 PPT; 5 x 10 sec aisha. SKTC; provided pt. w/ HEP - instructed to perform prn    Assessment       Initial Assessment (Pertinent finding, problem list and factors affecting outcome): pt. presents w/ intermittent lbp that limits her ability to perform her everyday activities; pt. would benefit from PT to address these areas and to reinforce the HEP  Rehab Potiential: good    Short Term Goals (2 Weeks):   1.  Blanca. tx session w/out increase in symptoms.  2.  Decrease c/o pain to 0/10  3.  Able to demo proper log rolling technique    Long Term Goals (4 Weeks):   1.  Demo comp w/ HEP  2.  Improve Oswestry score to 13/50  3.  No c/o rt LE radiculopathy    Plan     Certification Period: 10/23/18 to 11/24/18  Recommended Treatment Plan: eval, plus 2 times per week for 4 weeks (starting wk of 10/28/18): Manual Therapy, Moist Heat/ Ice, Neuromuscular Re-ed, Patient Education, Therapeutic Activites, Therapeutic Exercise, Ultrasound and HEP  Other Recommendations: Dry Needling      Therapist: Porter Hernandez, PT    I CERTIFY THE NEED FOR THESE SERVICES FURNISHED UNDER THIS PLAN OF TREATMENT AND WHILE UNDER MY CARE    Physician's comments:  ________________________________________________________________________________________________________________________________________________      Physician's Name: ___________________________________

## 2018-10-31 ENCOUNTER — CLINICAL SUPPORT (OUTPATIENT)
Dept: REHABILITATION | Facility: HOSPITAL | Age: 78
End: 2018-10-31
Attending: FAMILY MEDICINE
Payer: MEDICARE

## 2018-10-31 DIAGNOSIS — G89.29 CHRONIC BILATERAL LOW BACK PAIN WITH RIGHT-SIDED SCIATICA: ICD-10-CM

## 2018-10-31 DIAGNOSIS — M54.41 CHRONIC BILATERAL LOW BACK PAIN WITH RIGHT-SIDED SCIATICA: ICD-10-CM

## 2018-10-31 PROCEDURE — 97110 THERAPEUTIC EXERCISES: CPT | Mod: PN

## 2018-10-31 NOTE — PROGRESS NOTES
"Name: Alexa Otero  [facility] Number: [id number]  Date of Treatment: 10/31/2018   Diagnosis:   Encounter Diagnosis   Name Primary?    Chronic bilateral low back pain with right-sided sciatica        Time in: 0956  Time Out: 1045  Total Treatment Time: 49      Subjective:    Alexa reports no changes since eval, states "My back pain is more of an annoyance."  Patient reports their pain to be 1/10 on a 0-10 scale with 0 being no pain and 10 being the worst pain imaginable.    Objective  Alexa received therapeutic exercises to develop strength, endurance, ROM, flexibility, posture and core stabilization for 49 minutes including:   NuStep 10' L4   Hamstring stretch 3/30s R/L   Piriformis stretch 3/30s R/L   Gastroc stretch 3/30s B   Seated trunk  Fwd and L/R flexion 5/10s with theraball    LTR with tball 3/10   DKTC with tball 3/10   TrA 3/10 supine   Bridges 3/10   PPT 3/10   Ball squeeze 3/10   Supine CLAM 3/10 GTB      Written Home Exercises Provided: Cont with HEP  Pt demo good understanding of the education provided. Alexa demonstrated good return demonstration of activities.     Assessment:   Patient expressed good understanding of process of therapy and at times pain may increase due to DOMs and it's to be expected.  Good tolerance of therex with todays session.    Pt will continue to benefit from skilled PT intervention. Medical Necessity is demonstrated by:  Requires skilled supervision to complete and progress HEP and Weakness.    Patient is making good progress towards established goals.    Plan:  Continue with established Plan of Care towards PT goals.   "

## 2018-11-02 ENCOUNTER — CLINICAL SUPPORT (OUTPATIENT)
Dept: REHABILITATION | Facility: HOSPITAL | Age: 78
End: 2018-11-02
Attending: FAMILY MEDICINE
Payer: MEDICARE

## 2018-11-02 DIAGNOSIS — M48.061 SPINAL STENOSIS OF LUMBAR REGION, UNSPECIFIED WHETHER NEUROGENIC CLAUDICATION PRESENT: Primary | ICD-10-CM

## 2018-11-02 PROCEDURE — 97110 THERAPEUTIC EXERCISES: CPT | Mod: PN

## 2018-11-02 NOTE — PROGRESS NOTES
"Name: Alexa Otero  [facility] Number: [id number]  Date of Treatment: 11/02/2018   Diagnosis:   Encounter Diagnosis   Name Primary?    Spinal stenosis of lumbar region, unspecified whether neurogenic claudication present Yes       Time in: 1355  Time Out: 1455  Total Treatment Time: 60  Group Time: 0      Subjective:    Alexa reports improvement of symptoms.  Patient reports their pain to be 1/10 on a 0-10 scale with 0 being no pain and 10 being the worst pain imaginable.    Objective    Alexa received therapeutic exercises to develop strength, endurance, flexibility and core stabilization for 60 minutes including:     NuStep Lv4 10'  HS stretch 3x30" sit with stool R/L  IT band stretch 3x30" supine with strap R/L  PPT x30  PPT with ball squeeze x30  Bridges 2x10  LTR x30 with Tball  DKTC x30 with Tball  SLR 3x10 R/L  Piriformis stretch 3x30" sit R/L  Trunk flexion 10" x5: fwd, R/L    Pt demo good understanding of the education provided. Alexa demonstrated good return demonstration of activities with cues for proper form.     Assessment:     Pt will continue to benefit from skilled PT intervention. Medical Necessity is demonstrated by:  Pain limits function of effected part for some activities, Unable to participate fully in daily activities, Requires skilled supervision to complete and progress HEP and Weakness.    Patient is making good progress towards established goals.    Plan:    Continue with established Plan of Care towards PT goals.   "

## 2018-11-05 ENCOUNTER — OFFICE VISIT (OUTPATIENT)
Dept: FAMILY MEDICINE | Facility: CLINIC | Age: 78
End: 2018-11-05
Payer: MEDICARE

## 2018-11-05 VITALS
HEART RATE: 91 BPM | WEIGHT: 153 LBS | TEMPERATURE: 98 F | SYSTOLIC BLOOD PRESSURE: 134 MMHG | DIASTOLIC BLOOD PRESSURE: 74 MMHG | HEIGHT: 63 IN | BODY MASS INDEX: 27.11 KG/M2

## 2018-11-05 DIAGNOSIS — I10 ESSENTIAL HYPERTENSION: ICD-10-CM

## 2018-11-05 DIAGNOSIS — M54.50 MIDLINE LOW BACK PAIN WITHOUT SCIATICA, UNSPECIFIED CHRONICITY: ICD-10-CM

## 2018-11-05 DIAGNOSIS — E11.59 HYPERTENSION ASSOCIATED WITH DIABETES: ICD-10-CM

## 2018-11-05 DIAGNOSIS — R05.8 DRY COUGH: ICD-10-CM

## 2018-11-05 DIAGNOSIS — I15.2 HYPERTENSION ASSOCIATED WITH DIABETES: ICD-10-CM

## 2018-11-05 DIAGNOSIS — E11.9 CONTROLLED TYPE 2 DIABETES MELLITUS WITHOUT COMPLICATION, WITHOUT LONG-TERM CURRENT USE OF INSULIN: Primary | ICD-10-CM

## 2018-11-05 PROCEDURE — 1101F PT FALLS ASSESS-DOCD LE1/YR: CPT | Mod: S$GLB,,, | Performed by: PHYSICIAN ASSISTANT

## 2018-11-05 PROCEDURE — 3075F SYST BP GE 130 - 139MM HG: CPT | Mod: S$GLB,,, | Performed by: PHYSICIAN ASSISTANT

## 2018-11-05 PROCEDURE — 99213 OFFICE O/P EST LOW 20 MIN: CPT | Mod: S$GLB,,, | Performed by: PHYSICIAN ASSISTANT

## 2018-11-05 PROCEDURE — 99999 PR PBB SHADOW E&M-EST. PATIENT-LVL IV: CPT | Mod: PBBFAC,,, | Performed by: PHYSICIAN ASSISTANT

## 2018-11-05 PROCEDURE — 3078F DIAST BP <80 MM HG: CPT | Mod: S$GLB,,, | Performed by: PHYSICIAN ASSISTANT

## 2018-11-05 RX ORDER — BENZONATATE 100 MG/1
100 CAPSULE ORAL 3 TIMES DAILY PRN
Qty: 10 CAPSULE | Refills: 0 | Status: SHIPPED | OUTPATIENT
Start: 2018-11-05 | End: 2018-11-15

## 2018-11-05 NOTE — PROGRESS NOTES
Subjective:       Patient ID: Alexa Otero is a 78 y.o. female.    Chief Complaint: Follow-up    Patient with controlled type 2 diabetes mellitus, hypertension, and lumbar spinal stenosis presents for routine follow-up.  She completed a Medrol Dosepak with improvement in her low back pain. She has gone to physical therapy for 3 appointments.  She initially had improvement in her low back pain but after the last physical therapy appointment her pain increased.  She is able to take extra-strength Tylenol with improvement in her pain. She has a new complaint today of dry hacking cough for the past 2 weeks.  Cough is worse at nighttime.  She denies chest pain or shortness of breath.  She has no nasal congestion rhinorrhea or sore throat.  She has not taken anything for the cough.  Patients patient medical/surgical, social and family histories have been reviewed         Review of Systems   Constitutional: Negative for activity change, appetite change, fatigue and unexpected weight change.   HENT: Negative for congestion, postnasal drip, rhinorrhea, sinus pressure, sneezing and sore throat.    Eyes: Negative for visual disturbance.   Respiratory: Positive for cough. Negative for shortness of breath.    Cardiovascular: Negative for chest pain, palpitations and leg swelling.   Gastrointestinal: Negative for abdominal pain, constipation, diarrhea and nausea.   Endocrine: Negative for polydipsia and polyuria.   Genitourinary: Negative for difficulty urinating and dysuria.   Musculoskeletal: Positive for back pain. Negative for arthralgias.   Skin: Negative for rash.   Neurological: Negative for dizziness, light-headedness and headaches.   Psychiatric/Behavioral: Negative for dysphoric mood and sleep disturbance. The patient is not nervous/anxious.        Objective:      Physical Exam   Constitutional: She appears well-developed and well-nourished. She is cooperative. No distress.   Appears uncomfortable and in pain   HENT:    Head: Normocephalic and atraumatic.   Right Ear: Tympanic membrane, external ear and ear canal normal.   Left Ear: Tympanic membrane, external ear and ear canal normal.   Nose: Mucosal edema present.   Mouth/Throat: Oropharynx is clear and moist. Mucous membranes are not dry. No oropharyngeal exudate, posterior oropharyngeal edema or posterior oropharyngeal erythema.   Eyes: Conjunctivae and EOM are normal. Pupils are equal, round, and reactive to light. No scleral icterus.   Cardiovascular: Normal rate, regular rhythm and normal heart sounds.   No murmur heard.  Pulmonary/Chest: Effort normal and breath sounds normal. She has no wheezes. She has no rales.   Abdominal: Soft. Bowel sounds are normal. She exhibits no distension. There is no tenderness.   Musculoskeletal:        Right hip: She exhibits normal range of motion and normal strength.        Left hip: She exhibits normal range of motion and normal strength.        Lumbar back: She exhibits tenderness. She exhibits normal range of motion.        Right lower leg: She exhibits no edema.        Left lower leg: She exhibits no edema.   Straight leg raise negative bilaterally  Tenderness is over the lumbar paraspinal muscles   Lymphadenopathy:     She has no cervical adenopathy.   Neurological: She is alert.   Reflex Scores:       Patellar reflexes are 2+ on the right side and 2+ on the left side.       Achilles reflexes are 2+ on the right side and 2+ on the left side.  Skin: Skin is warm and dry.   Psychiatric: She has a normal mood and affect. Her speech is normal. Judgment normal. Cognition and memory are normal.   Nursing note and vitals reviewed.      Assessment:       1. Controlled type 2 diabetes mellitus without complication, without long-term current use of insulin    2. Essential hypertension    3. Hypertension associated with diabetes    4. Dry cough    5. Midline low back pain without sciatica, unspecified chronicity        Plan:       Alexa was  seen today for follow-up.    Diagnoses and all orders for this visit:    Controlled type 2 diabetes mellitus without complication, without long-term current use of insulin  Continue current medications.  Eye exam is up-to-date will obtain records from   Essential hypertension  At goal continue current medications  Hypertension associated with diabetes  Continue current medications  Dry cough new problem will treat    -     benzonatate (TESSALON) 100 MG capsule; Take 1 capsule (100 mg total) by mouth 3 (three) times daily as needed for Cough.    Midline low back pain without sciatica, unspecified chronicity  Continue current management encouraged physical therapy for 4-6 weeks if pain does not improve will image  Continue Tylenol

## 2018-11-06 ENCOUNTER — CLINICAL SUPPORT (OUTPATIENT)
Dept: REHABILITATION | Facility: HOSPITAL | Age: 78
End: 2018-11-06
Attending: FAMILY MEDICINE
Payer: MEDICARE

## 2018-11-06 DIAGNOSIS — G89.29 CHRONIC BILATERAL LOW BACK PAIN WITH RIGHT-SIDED SCIATICA: Primary | ICD-10-CM

## 2018-11-06 DIAGNOSIS — M54.41 CHRONIC BILATERAL LOW BACK PAIN WITH RIGHT-SIDED SCIATICA: Primary | ICD-10-CM

## 2018-11-06 PROCEDURE — 97110 THERAPEUTIC EXERCISES: CPT | Mod: PN

## 2018-11-06 NOTE — PROGRESS NOTES
"Name: Alexa Otero  [facility] Number: [id number]  Date of Treatment: 11/06/2018   Diagnosis:   Encounter Diagnosis   Name Primary?    Chronic bilateral low back pain with right-sided sciatica Yes       Time in: 1355  Time Out: 1455  Total Treatment Time: 60  Group Time: 0      Subjective:    Alexa reports had exercises soreness from last visit and continued LBP.  Patient reports their pain to be 2/10 on a 0-10 scale with 0 being no pain and 10 being the worst pain imaginable.    Objective    Alexa received therapeutic exercises to develop strength, endurance, flexibility and core stabilization for 60 minutes including:     Bike Lv2 10'  Hamstring stretch 3x30" sit with stool R/L  Piriformis stretch 3x30" sit R/L  Calf stretch 3x30" 1/2 roll B  Seated trunk flexion with Tball 10" x5: fwd, R/L  PPT x30 supine  LTR x30 with Tball supine R/L  DKTC x30 with Tball  PPT with ball squeeze x30  SKTC 10" x5 supine R/L  Bridge x10 with Tball  Sit<>Stand with hands on knees  Tennis ball massage supine on mat 5' to R lumbar area    Written Home Exercises Provided: yes    Pt demo good understanding of the education provided. Alexa demonstrated good return demonstration of activities.     Assessment:     Pt will continue to benefit from skilled PT intervention. Medical Necessity is demonstrated by:  Pain limits function of effected part for some activities, Unable to participate fully in daily activities, Requires skilled supervision to complete and progress HEP and Weakness.    Patient is making fair progress towards established goals.    Plan:    Continue with established Plan of Care towards PT goals.   "

## 2018-11-09 ENCOUNTER — CLINICAL SUPPORT (OUTPATIENT)
Dept: REHABILITATION | Facility: HOSPITAL | Age: 78
End: 2018-11-09
Attending: FAMILY MEDICINE
Payer: MEDICARE

## 2018-11-09 DIAGNOSIS — M54.5 LOW BACK PAIN, UNSPECIFIED BACK PAIN LATERALITY, UNSPECIFIED CHRONICITY, WITH SCIATICA PRESENCE UNSPECIFIED: ICD-10-CM

## 2018-11-09 PROCEDURE — 97110 THERAPEUTIC EXERCISES: CPT | Mod: PN

## 2018-11-09 NOTE — PROGRESS NOTES
Name: Alexa Otero  [facility] Number: [id number]  Date of Treatment: 11/09/2018   Diagnosis:   Encounter Diagnosis   Name Primary?    Low back pain, unspecified back pain laterality, unspecified chronicity, with sciatica presence unspecified        Time in: 1403  Time Out: 1500  Total Treatment Time: 55    Subjective:    Alexa reports improvement of symptoms.  Patient reports their LB discomfort pain to be 1/10 on a 0-10 scale with 0 being no pain and 10 being the worst pain imaginable. Cont to have pain with prolonged R SL which was 6/10 last night, resolves with position changes.    Objective:    Patient received individual therapy to increase strength, endurance, ROM, flexibility, posture and core stabilization with activities as follows:     Alexa received therapeutic exercises to develop strength, endurance, ROM, flexibility, posture and core stabilization for 55 minutes including:     Supine PPT 10/3  Supine bridges 10/3 B  Supine hooklying ballsqueeze hip aDd 10/3  Supine hooklying hip aBd clamshells 10/3 with strap   Supine hooklying trA sets 10/3  Supine DKC with swiss ball 10/3  Supine LTR with swiss ball 10/3  Supine ITB stretches L/R 5 x 30s L/R with strap  Supine hamstring stretches 5 x 15 s L/R  Supine piriformis fig 4 stretches 3/30s L/R  NuStep L2 10' LE's only    Continue HEP daily. Edu on proper sleep positioning for back protection.     Pt demo good understanding of the education provided. Patient demonstrated good return demonstration of activities.     Assessment:     Moving well through routine with cues for recall and proper technique. Discomfort reported after DKC with swiss ball-reviewed importance of notifying therapist anytime an ex causes increase in symptoms.     Pt will continue to benefit from skilled PT intervention. Medical Necessity is demonstrated by:  Requires skilled supervision to complete and progress HEP and Weakness.    Patient is making good progress towards established  goals.    Plan:    Continue with established Plan of Care towards PT goals.

## 2018-11-13 ENCOUNTER — CLINICAL SUPPORT (OUTPATIENT)
Dept: REHABILITATION | Facility: HOSPITAL | Age: 78
End: 2018-11-13
Attending: FAMILY MEDICINE
Payer: MEDICARE

## 2018-11-13 DIAGNOSIS — M54.5 LOW BACK PAIN, UNSPECIFIED BACK PAIN LATERALITY, UNSPECIFIED CHRONICITY, WITH SCIATICA PRESENCE UNSPECIFIED: Primary | ICD-10-CM

## 2018-11-13 PROCEDURE — 97110 THERAPEUTIC EXERCISES: CPT | Mod: PN

## 2018-11-13 NOTE — PROGRESS NOTES
"Name: Alexa Otero  [facility] Number: [id number]  Date of Treatment: 11/13/2018   Diagnosis:   Encounter Diagnosis   Name Primary?    Low back pain, unspecified back pain laterality, unspecified chronicity, with sciatica presence unspecified Yes       Time in: 1400  Time Out: 1455  Total Treatment Time: 55  Group Time: 0      Subjective:    Alexa reports improvement of symptoms and doing HEP most days.  Patient reports their pain to be 0/10 on a 0-10 scale with 0 being no pain and 10 being the worst pain imaginable.    Objective    lAexa received therapeutic exercises to develop strength, endurance, flexibility and core stabilization for 55 minutes including:     Bike Lv3 10'  Hamstring stretch 3x30" sit with stool R/L  Piriformis stretch 3x30" sit R/L  Calf stretch 3x30" 1/2 roll B  PPT with ball squeeze x30  Bridge with Hip abd with blue Tband 3x10  Hip 4-way on mat 2x10 R/L  LTR 3x10 with Tball supine R/L  DKTC 3x10 with Tball  Seated trunk flexion with Tball 5" x5: fwd, R/L    Pt demo good understanding of the education provided. Alexa demonstrated good return demonstration of activities with cues for direction and proper form.     Assessment:     Pt will continue to benefit from skilled PT intervention. Medical Necessity is demonstrated by:  Pain limits function of effected part for some activities, Unable to participate fully in daily activities, Requires skilled supervision to complete and progress HEP and Weakness.    Patient is making good progress towards established goals.    Plan:    Continue with established Plan of Care towards PT goals.   "

## 2018-11-16 ENCOUNTER — CLINICAL SUPPORT (OUTPATIENT)
Dept: REHABILITATION | Facility: HOSPITAL | Age: 78
End: 2018-11-16
Attending: FAMILY MEDICINE
Payer: MEDICARE

## 2018-11-16 DIAGNOSIS — M48.061 SPINAL STENOSIS OF LUMBAR REGION, UNSPECIFIED WHETHER NEUROGENIC CLAUDICATION PRESENT: Primary | ICD-10-CM

## 2018-11-16 PROCEDURE — 97110 THERAPEUTIC EXERCISES: CPT | Mod: PN

## 2018-11-16 NOTE — PROGRESS NOTES
Name: Alexa Otero  Clinic Number: [id number]  Date of Treatment: 11/16/2018   Diagnosis:   Encounter Diagnosis   Name Primary?    Spinal stenosis of lumbar region, unspecified whether neurogenic claudication present Yes       Time in: 1400  Time Out: 1450  Total Treatment Time: 50  Group Time: NA      Subjective:    Alexa reports mild pain levels in l/s.  Patient reports their pain to be 1/10 on a 0-10 scale with 0 being no pain and 10 being the worst pain imaginable.    Objective    Alexa received therapeutic exercises to develop strength, endurance and flexibility for 50 minutes including:     X 20 trunk rotations  X 20 PPT  5 x 10 sec aisha. SKTC  5 x 10 sec aisha. HSS  5 x 10 sec aisha. piriformis stretch  X 20 DKTC on orange t-ball  X 20 core = PPT > alt. LAQ > drop PPT  X 20 core = bridge > open/close knees > drop bridge  X 20 supine aisha. LE there ex = HS, hip ABD/ADD, SLR  X 3 supine to and from sit emphasizing log rolling technique  2 x 20 shuttle mini squats and calf raises (50#)  X 20 SportsArt knee curl (22#)    Assessment:     Mrs. Otero was able to jg. tx session w/out increase in symptoms.    Pt will continue to benefit from skilled PT intervention. Medical Necessity is demonstrated by:  Pain limits function of effected part for some activities, Unable to participate fully in daily activities and Weakness.    Patient is making good progress towards established goals.    New/Revised goals: NA      Plan:  Continue with established Plan of Care towards PT goals.

## 2018-11-21 ENCOUNTER — PES CALL (OUTPATIENT)
Dept: ADMINISTRATIVE | Facility: CLINIC | Age: 78
End: 2018-11-21

## 2018-11-26 ENCOUNTER — PATIENT MESSAGE (OUTPATIENT)
Dept: FAMILY MEDICINE | Facility: CLINIC | Age: 78
End: 2018-11-26

## 2018-11-26 DIAGNOSIS — Z12.39 SCREENING FOR BREAST CANCER: Primary | ICD-10-CM

## 2018-11-29 NOTE — PROGRESS NOTES
REHAB SERVICES OUTPATIENT DISCHARGE SUMMARY  Physical Therapy      Name:  Alexa Otero  Date:  11/29/2018  Date of Evaluation:  10/23/2018  Physician:  Idalia Greco MD  Total # Of Visits:  7  Cancelled:  2  No Shows:  0  Diagnosis:    1. Spinal stenosis of lumbar region, unspecified whether neurogenic claudication present         Physical/Functional Status:  At time of discharge, patient was able to tolerate LE flexibility and strengthening, trunk stability exercises and endurance training.     The patient is to be discharged from our Therapy service for the following reason(s):  Patient has not attended therapy since 11/16/2018    Degree of Goal Achievement:  Patient has partially met goals    Patient Education:  Pt provided with HEP.    Discharge Plan:  Home Program and Other:  Follow-up with MD

## 2018-12-17 ENCOUNTER — TELEPHONE (OUTPATIENT)
Dept: ADMINISTRATIVE | Facility: HOSPITAL | Age: 78
End: 2018-12-17

## 2018-12-17 DIAGNOSIS — I10 ESSENTIAL HYPERTENSION: ICD-10-CM

## 2018-12-17 RX ORDER — SIMVASTATIN 40 MG/1
40 TABLET, FILM COATED ORAL NIGHTLY
Qty: 90 TABLET | Refills: 3 | Status: SHIPPED | OUTPATIENT
Start: 2018-12-17 | End: 2019-03-01 | Stop reason: SDUPTHER

## 2018-12-17 RX ORDER — HYDROCHLOROTHIAZIDE 25 MG/1
25 TABLET ORAL DAILY
Qty: 90 TABLET | Refills: 3 | Status: SHIPPED | OUTPATIENT
Start: 2018-12-17 | End: 2019-03-01 | Stop reason: SDUPTHER

## 2018-12-17 RX ORDER — METFORMIN HYDROCHLORIDE 500 MG/1
500 TABLET ORAL 2 TIMES DAILY WITH MEALS
Qty: 180 TABLET | Refills: 3 | Status: SHIPPED | OUTPATIENT
Start: 2018-12-17 | End: 2019-03-01 | Stop reason: SDUPTHER

## 2018-12-17 RX ORDER — OMEPRAZOLE 40 MG/1
40 CAPSULE, DELAYED RELEASE ORAL DAILY
Qty: 90 CAPSULE | Refills: 3 | Status: SHIPPED | OUTPATIENT
Start: 2018-12-17 | End: 2019-03-01 | Stop reason: SDUPTHER

## 2018-12-17 RX ORDER — BENAZEPRIL HYDROCHLORIDE 40 MG/1
40 TABLET ORAL DAILY
Qty: 90 TABLET | Refills: 3 | Status: SHIPPED | OUTPATIENT
Start: 2018-12-17 | End: 2019-03-01 | Stop reason: SDUPTHER

## 2019-03-01 DIAGNOSIS — I10 ESSENTIAL HYPERTENSION: ICD-10-CM

## 2019-03-01 RX ORDER — HYDROCHLOROTHIAZIDE 25 MG/1
25 TABLET ORAL DAILY
Qty: 90 TABLET | Refills: 3 | Status: SHIPPED | OUTPATIENT
Start: 2019-03-01 | End: 2019-03-11 | Stop reason: SDUPTHER

## 2019-03-01 RX ORDER — BENAZEPRIL HYDROCHLORIDE 40 MG/1
40 TABLET ORAL DAILY
Qty: 90 TABLET | Refills: 3 | Status: SHIPPED | OUTPATIENT
Start: 2019-03-01 | End: 2019-03-11 | Stop reason: SDUPTHER

## 2019-03-01 RX ORDER — SIMVASTATIN 40 MG/1
40 TABLET, FILM COATED ORAL NIGHTLY
Qty: 90 TABLET | Refills: 3 | Status: SHIPPED | OUTPATIENT
Start: 2019-03-01 | End: 2019-03-11 | Stop reason: SDUPTHER

## 2019-03-01 RX ORDER — METFORMIN HYDROCHLORIDE 500 MG/1
500 TABLET ORAL 2 TIMES DAILY WITH MEALS
Qty: 180 TABLET | Refills: 3 | Status: SHIPPED | OUTPATIENT
Start: 2019-03-01 | End: 2019-03-11 | Stop reason: SDUPTHER

## 2019-03-01 RX ORDER — OMEPRAZOLE 40 MG/1
40 CAPSULE, DELAYED RELEASE ORAL DAILY
Qty: 90 CAPSULE | Refills: 3 | Status: SHIPPED | OUTPATIENT
Start: 2019-03-01 | End: 2019-03-11 | Stop reason: SDUPTHER

## 2019-03-11 DIAGNOSIS — I10 ESSENTIAL HYPERTENSION: ICD-10-CM

## 2019-03-11 NOTE — TELEPHONE ENCOUNTER
Patient requesting refill of attached medication. Prescriptions were set to print on 3/1/19 by Dr. Alaniz. Patient requesting prescription via [facility].   LOV--11/5/18 (Carlos Manuel)  FOV--4/29/19 (Angus)

## 2019-03-13 RX ORDER — OMEPRAZOLE 40 MG/1
40 CAPSULE, DELAYED RELEASE ORAL DAILY
Qty: 90 CAPSULE | Refills: 3 | Status: SHIPPED | OUTPATIENT
Start: 2019-03-13 | End: 2020-03-18 | Stop reason: SDUPTHER

## 2019-03-13 RX ORDER — SIMVASTATIN 40 MG/1
40 TABLET, FILM COATED ORAL NIGHTLY
Qty: 90 TABLET | Refills: 3 | Status: SHIPPED | OUTPATIENT
Start: 2019-03-13 | End: 2020-03-18 | Stop reason: SDUPTHER

## 2019-03-13 RX ORDER — HYDROCHLOROTHIAZIDE 25 MG/1
25 TABLET ORAL DAILY
Qty: 90 TABLET | Refills: 3 | Status: SHIPPED | OUTPATIENT
Start: 2019-03-13 | End: 2020-03-18 | Stop reason: SDUPTHER

## 2019-03-13 RX ORDER — BENAZEPRIL HYDROCHLORIDE 40 MG/1
40 TABLET ORAL DAILY
Qty: 90 TABLET | Refills: 3 | Status: SHIPPED | OUTPATIENT
Start: 2019-03-13 | End: 2020-03-18 | Stop reason: SDUPTHER

## 2019-03-13 RX ORDER — METFORMIN HYDROCHLORIDE 500 MG/1
500 TABLET ORAL 2 TIMES DAILY WITH MEALS
Qty: 180 TABLET | Refills: 3 | Status: SHIPPED | OUTPATIENT
Start: 2019-03-13 | End: 2020-03-18 | Stop reason: SDUPTHER

## 2019-04-15 ENCOUNTER — PATIENT OUTREACH (OUTPATIENT)
Dept: ADMINISTRATIVE | Facility: HOSPITAL | Age: 79
End: 2019-04-15

## 2019-04-15 NOTE — PROGRESS NOTES
Health Maintenance Due   Topic Date Due    TETANUS VACCINE  09/01/2018    Foot Exam  01/12/2019    Hemoglobin A1c  04/05/2019

## 2019-04-15 NOTE — LETTER
April 23, 2019    Alexa Otero  [address] [place] [address]             [facility]  [address]  [facility] [address]  Phone: [phone] [facility] is committed to your overall health.  To help you get the most out of each of your visits, we will review your information to make sure you are up to date on all of your recommended tests and/or procedures.       Dr. Idalia Greco MD has found that your chart shows you may be due for a:     DIABETIC FOOT EXAM     If you have had any of the above done at another facility, please bring the records or information with you so that your record at [facility] will be complete.  If you would like to schedule any of these, please contact the clinic at [phone].     If you are currently taking medication, please bring it with you to your appointment for review.     Also, if you have any type of Advanced Directives, please bring them with you to your office visit so we may scan them into your chart.     Thank You,     Your [facility] Team,   MD Cassandra Casas LPN Clinical Care Coordinator   [facility]   [address] [place] [address]   Phone [phone]   Fax [phone]

## 2019-04-15 NOTE — LETTER
April 23, 2019    Alexa Otero  [address] [place] [address]             [facility]  [address]  [facility] [facility] [address]  Phone: [phone] [facility] is committed to your overall health.  To help you get the most out of each of your visits, we will review your information to make sure you are up to date on all of your recommended tests and/or procedures.       Dr. Idalia Greco MD has found that your chart shows you may be due for a:     DIABETIC FOOT EXAM     If you have had any of the above done at another facility, please bring the records or information with you so that your record at [facility] will be complete.  If you would like to schedule any of these, please contact the clinic at [phone].     If you are currently taking medication, please bring it with you to your appointment for review.     Also, if you have any type of Advanced Directives, please bring them with you to your office visit so we may scan them into your chart.     Thank You,     Your [facility] Team,   MD Cassandra Casas LPN Clinical Care Coordinator   [facility]   [address] [place] [address]   Phone [phone]   Fax [phone]

## 2019-04-23 ENCOUNTER — PATIENT MESSAGE (OUTPATIENT)
Dept: FAMILY MEDICINE | Facility: CLINIC | Age: 79
End: 2019-04-23

## 2019-04-23 DIAGNOSIS — E11.9 CONTROLLED TYPE 2 DIABETES MELLITUS WITHOUT COMPLICATION, WITHOUT LONG-TERM CURRENT USE OF INSULIN: Primary | ICD-10-CM

## 2019-04-23 NOTE — PROGRESS NOTES
"Attempted to outreach patient for pre-visit via "[employer]", no answer after a week. Sending outreach via Mail Out Letter now.    "

## 2019-04-24 ENCOUNTER — LAB VISIT (OUTPATIENT)
Dept: LAB | Facility: HOSPITAL | Age: 79
End: 2019-04-24
Attending: FAMILY MEDICINE
Payer: MEDICARE

## 2019-04-24 DIAGNOSIS — E11.9 CONTROLLED TYPE 2 DIABETES MELLITUS WITHOUT COMPLICATION, WITHOUT LONG-TERM CURRENT USE OF INSULIN: ICD-10-CM

## 2019-04-24 DIAGNOSIS — E78.5 HYPERLIPIDEMIA ASSOCIATED WITH TYPE 2 DIABETES MELLITUS: ICD-10-CM

## 2019-04-24 DIAGNOSIS — E11.69 HYPERLIPIDEMIA ASSOCIATED WITH TYPE 2 DIABETES MELLITUS: ICD-10-CM

## 2019-04-24 DIAGNOSIS — E55.9 VITAMIN D DEFICIENCY DISEASE: ICD-10-CM

## 2019-04-24 LAB
25(OH)D3+25(OH)D2 SERPL-MCNC: 40 NG/ML (ref 30–96)
ALBUMIN SERPL BCP-MCNC: 3.9 G/DL (ref 3.5–5.2)
ALBUMIN/CREAT UR: 5.3 UG/MG (ref 0–30)
ALP SERPL-CCNC: 44 U/L (ref 55–135)
ALT SERPL W/O P-5'-P-CCNC: 12 U/L (ref 10–44)
ANION GAP SERPL CALC-SCNC: 13 MMOL/L (ref 8–16)
AST SERPL-CCNC: 14 U/L (ref 10–40)
BASOPHILS # BLD AUTO: 0.06 K/UL (ref 0–0.2)
BASOPHILS NFR BLD: 0.9 % (ref 0–1.9)
BILIRUB SERPL-MCNC: 0.4 MG/DL (ref 0.1–1)
BUN SERPL-MCNC: 30 MG/DL (ref 8–23)
CALCIUM SERPL-MCNC: 10.3 MG/DL (ref 8.7–10.5)
CHLORIDE SERPL-SCNC: 101 MMOL/L (ref 95–110)
CHOLEST SERPL-MCNC: 156 MG/DL (ref 120–199)
CHOLEST/HDLC SERPL: 4.2 {RATIO} (ref 2–5)
CO2 SERPL-SCNC: 27 MMOL/L (ref 23–29)
CREAT SERPL-MCNC: 0.9 MG/DL (ref 0.5–1.4)
CREAT UR-MCNC: 188 MG/DL (ref 15–325)
DIFFERENTIAL METHOD: ABNORMAL
EOSINOPHIL # BLD AUTO: 0.3 K/UL (ref 0–0.5)
EOSINOPHIL NFR BLD: 3.8 % (ref 0–8)
ERYTHROCYTE [DISTWIDTH] IN BLOOD BY AUTOMATED COUNT: 13.2 % (ref 11.5–14.5)
EST. GFR  (AFRICAN AMERICAN): >60 ML/MIN/1.73 M^2
EST. GFR  (NON AFRICAN AMERICAN): >60 ML/MIN/1.73 M^2
ESTIMATED AVG GLUCOSE: 151 MG/DL (ref 68–131)
GLUCOSE SERPL-MCNC: 110 MG/DL (ref 70–110)
HBA1C MFR BLD HPLC: 6.9 % (ref 4–5.6)
HCT VFR BLD AUTO: 43.4 % (ref 37–48.5)
HDLC SERPL-MCNC: 37 MG/DL (ref 40–75)
HDLC SERPL: 23.7 % (ref 20–50)
HGB BLD-MCNC: 13.8 G/DL (ref 12–16)
IMM GRANULOCYTES # BLD AUTO: 0.01 K/UL (ref 0–0.04)
IMM GRANULOCYTES NFR BLD AUTO: 0.2 % (ref 0–0.5)
LDLC SERPL CALC-MCNC: 97.8 MG/DL (ref 63–159)
LYMPHOCYTES # BLD AUTO: 2.5 K/UL (ref 1–4.8)
LYMPHOCYTES NFR BLD: 37.8 % (ref 18–48)
MCH RBC QN AUTO: 29.2 PG (ref 27–31)
MCHC RBC AUTO-ENTMCNC: 31.8 G/DL (ref 32–36)
MCV RBC AUTO: 92 FL (ref 82–98)
MICROALBUMIN UR DL<=1MG/L-MCNC: 10 UG/ML
MONOCYTES # BLD AUTO: 0.5 K/UL (ref 0.3–1)
MONOCYTES NFR BLD: 7.1 % (ref 4–15)
NEUTROPHILS # BLD AUTO: 3.3 K/UL (ref 1.8–7.7)
NEUTROPHILS NFR BLD: 50.2 % (ref 38–73)
NONHDLC SERPL-MCNC: 119 MG/DL
NRBC BLD-RTO: 0 /100 WBC
PLATELET # BLD AUTO: 309 K/UL (ref 150–350)
PMV BLD AUTO: 10.2 FL (ref 9.2–12.9)
POTASSIUM SERPL-SCNC: 3.6 MMOL/L (ref 3.5–5.1)
PROT SERPL-MCNC: 7.2 G/DL (ref 6–8.4)
RBC # BLD AUTO: 4.72 M/UL (ref 4–5.4)
SODIUM SERPL-SCNC: 141 MMOL/L (ref 136–145)
TRIGL SERPL-MCNC: 106 MG/DL (ref 30–150)
WBC # BLD AUTO: 6.58 K/UL (ref 3.9–12.7)

## 2019-04-24 PROCEDURE — 83036 HEMOGLOBIN GLYCOSYLATED A1C: CPT

## 2019-04-24 PROCEDURE — 82043 UR ALBUMIN QUANTITATIVE: CPT

## 2019-04-24 PROCEDURE — 80053 COMPREHEN METABOLIC PANEL: CPT

## 2019-04-24 PROCEDURE — 85025 COMPLETE CBC W/AUTO DIFF WBC: CPT

## 2019-04-24 PROCEDURE — 80061 LIPID PANEL: CPT

## 2019-04-24 PROCEDURE — 82306 VITAMIN D 25 HYDROXY: CPT

## 2019-04-24 PROCEDURE — 36415 COLL VENOUS BLD VENIPUNCTURE: CPT | Mod: PO

## 2019-04-29 ENCOUNTER — OFFICE VISIT (OUTPATIENT)
Dept: FAMILY MEDICINE | Facility: CLINIC | Age: 79
End: 2019-04-29
Payer: MEDICARE

## 2019-04-29 VITALS
DIASTOLIC BLOOD PRESSURE: 80 MMHG | OXYGEN SATURATION: 95 % | TEMPERATURE: 98 F | WEIGHT: 152.31 LBS | HEART RATE: 90 BPM | BODY MASS INDEX: 26.99 KG/M2 | HEIGHT: 63 IN | SYSTOLIC BLOOD PRESSURE: 140 MMHG | RESPIRATION RATE: 12 BRPM

## 2019-04-29 DIAGNOSIS — M72.0 DUPUYTREN'S CONTRACTURE OF BOTH HANDS: ICD-10-CM

## 2019-04-29 DIAGNOSIS — D75.1 POLYCYTHEMIA, SECONDARY: ICD-10-CM

## 2019-04-29 DIAGNOSIS — Z87.19 HISTORY OF BARRETT'S ESOPHAGUS: ICD-10-CM

## 2019-04-29 DIAGNOSIS — E78.5 HYPERLIPIDEMIA, UNSPECIFIED HYPERLIPIDEMIA TYPE: ICD-10-CM

## 2019-04-29 DIAGNOSIS — I10 ESSENTIAL HYPERTENSION: Primary | ICD-10-CM

## 2019-04-29 DIAGNOSIS — E11.9 CONTROLLED TYPE 2 DIABETES MELLITUS WITHOUT COMPLICATION, WITHOUT LONG-TERM CURRENT USE OF INSULIN: ICD-10-CM

## 2019-04-29 DIAGNOSIS — E55.9 VITAMIN D DEFICIENCY DISEASE: ICD-10-CM

## 2019-04-29 PROCEDURE — 3079F DIAST BP 80-89 MM HG: CPT | Mod: CPTII,S$GLB,, | Performed by: FAMILY MEDICINE

## 2019-04-29 PROCEDURE — 99499 RISK ADDL DX/OHS AUDIT: ICD-10-PCS | Mod: S$GLB,,, | Performed by: FAMILY MEDICINE

## 2019-04-29 PROCEDURE — 1101F PT FALLS ASSESS-DOCD LE1/YR: CPT | Mod: CPTII,S$GLB,, | Performed by: FAMILY MEDICINE

## 2019-04-29 PROCEDURE — 99214 PR OFFICE/OUTPT VISIT, EST, LEVL IV, 30-39 MIN: ICD-10-PCS | Mod: S$GLB,,, | Performed by: FAMILY MEDICINE

## 2019-04-29 PROCEDURE — 99499 UNLISTED E&M SERVICE: CPT | Mod: S$GLB,,, | Performed by: FAMILY MEDICINE

## 2019-04-29 PROCEDURE — 3077F SYST BP >= 140 MM HG: CPT | Mod: CPTII,S$GLB,, | Performed by: FAMILY MEDICINE

## 2019-04-29 PROCEDURE — 99999 PR PBB SHADOW E&M-EST. PATIENT-LVL IV: ICD-10-PCS | Mod: PBBFAC,,, | Performed by: FAMILY MEDICINE

## 2019-04-29 PROCEDURE — 99214 OFFICE O/P EST MOD 30 MIN: CPT | Mod: S$GLB,,, | Performed by: FAMILY MEDICINE

## 2019-04-29 PROCEDURE — 3077F PR MOST RECENT SYSTOLIC BLOOD PRESSURE >= 140 MM HG: ICD-10-PCS | Mod: CPTII,S$GLB,, | Performed by: FAMILY MEDICINE

## 2019-04-29 PROCEDURE — 3079F PR MOST RECENT DIASTOLIC BLOOD PRESSURE 80-89 MM HG: ICD-10-PCS | Mod: CPTII,S$GLB,, | Performed by: FAMILY MEDICINE

## 2019-04-29 PROCEDURE — 1101F PR PT FALLS ASSESS DOC 0-1 FALLS W/OUT INJ PAST YR: ICD-10-PCS | Mod: CPTII,S$GLB,, | Performed by: FAMILY MEDICINE

## 2019-04-29 PROCEDURE — 99999 PR PBB SHADOW E&M-EST. PATIENT-LVL IV: CPT | Mod: PBBFAC,,, | Performed by: FAMILY MEDICINE

## 2019-04-29 NOTE — PROGRESS NOTES
Subjective:       Patient ID: Alexa Otero is a 79 y.o. female.    Chief Complaint: Follow-up (6mth f/u hypertensio)    HPI  Review of Systems   Constitutional: Negative for fatigue and unexpected weight change.   Respiratory: Negative for chest tightness and shortness of breath.    Cardiovascular: Negative for chest pain, palpitations and leg swelling.   Gastrointestinal: Negative for abdominal pain.   Musculoskeletal: Negative for arthralgias.   Neurological: Positive for numbness. Negative for dizziness, syncope, light-headedness and headaches.       Patient Active Problem List   Diagnosis    Sciatica    Syncope and collapse    Polycythemia, secondary    Hyperlipemia    Diabetes mellitus type 2, controlled    HTN (hypertension)    Acute chest pain    Vitamin D deficiency disease    Gastroesophageal reflux disease    History of Kwon's esophagus    Pancreatic pseudocyst/cyst    Hepatic cyst    Low back pain radiating to both legs    Primary osteoarthritis of right ankle    Kwon's esophagus    Low back pain    Dupuytren's contracture of both hands     Patient is here for a chronic conditions follow up.    Reviewed labs 4/19-last EGD 5/18    Mild numbness in toes outer and big aisha. occ LBP.  dupuytrens in hands  Objective:      Physical Exam   Constitutional: She is oriented to person, place, and time. She appears well-developed and well-nourished.   Cardiovascular: Normal rate, regular rhythm and normal heart sounds.   Pulses:       Dorsalis pedis pulses are 3+ on the right side, and 3+ on the left side.        Posterior tibial pulses are 3+ on the right side, and 3+ on the left side.   Pulmonary/Chest: Effort normal and breath sounds normal.   Musculoskeletal: She exhibits no edema.        Right foot: There is normal range of motion and no deformity.        Left foot: There is normal range of motion and no deformity.   Feet:   Right Foot:   Protective Sensation: 10 sites tested. 10 sites  sensed.   Skin Integrity: Negative for ulcer, blister or skin breakdown.   Left Foot:   Protective Sensation: 10 sites tested. 10 sites sensed.   Skin Integrity: Negative for ulcer, blister or skin breakdown.   Neurological: She is alert and oriented to person, place, and time.   Skin: Skin is warm and dry.   Psychiatric: She has a normal mood and affect.   Nursing note and vitals reviewed.      Assessment:       1. Essential hypertension    2. Hyperlipidemia, unspecified hyperlipidemia type    3. Polycythemia, secondary    4. Controlled type 2 diabetes mellitus without complication, without long-term current use of insulin    5. Vitamin D deficiency disease    6. History of Kwon's esophagus    7. Dupuytren's contracture of both hands        Plan:         1. Essential hypertension  Controlled on current medications.  Continue current medications.      2. Hyperlipidemia, unspecified hyperlipidemia type  Controlled on current medications.  Continue current medications.      3. Polycythemia, secondary  Normal. monitor    4. Controlled type 2 diabetes mellitus without complication, without long-term current use of insulin  Slightly up due to diet. Controlled on current medications.  Continue current medications.    - CBC auto differential; Future  - Comprehensive metabolic panel; Future  - Hemoglobin A1c; Future    5. Vitamin D deficiency disease  Normal. monitor    6. History of Kwon's esophagus  Cont gi surveillance    7. Dupuytren's contracture of both hands  Chronic. Cont stretching    Time spent with patient: 20 minutes    Patient with be reevaluated in 6 months or sooner prn    Greater than 50% of this visit was spent counseling as described in above documentation:Yes

## 2019-05-23 ENCOUNTER — CLINICAL SUPPORT (OUTPATIENT)
Dept: FAMILY MEDICINE | Facility: CLINIC | Age: 79
End: 2019-05-23
Payer: MEDICARE

## 2019-05-23 VITALS — DIASTOLIC BLOOD PRESSURE: 70 MMHG | SYSTOLIC BLOOD PRESSURE: 130 MMHG

## 2019-05-23 DIAGNOSIS — I10 ESSENTIAL HYPERTENSION: Primary | ICD-10-CM

## 2019-05-23 PROCEDURE — 99999 PR PBB SHADOW E&M-EST. PATIENT-LVL I: ICD-10-PCS | Mod: PBBFAC,,,

## 2019-05-23 PROCEDURE — 99999 PR PBB SHADOW E&M-EST. PATIENT-LVL I: CPT | Mod: PBBFAC,,,

## 2019-05-23 NOTE — PROGRESS NOTES
Pt comes in for a nurse B/P check. Pt's BP was 130/70, manual on right arm, sitting position. Pt's current BP medications are Lotensin 40 mg, 1 tab QD and HCTz 25 mg, 1 tab QD. Advised pt to continue present treatment and f/u as directed.

## 2019-06-17 ENCOUNTER — CLINICAL SUPPORT (OUTPATIENT)
Dept: URGENT CARE | Facility: CLINIC | Age: 79
End: 2019-06-17
Payer: MEDICARE

## 2019-06-17 VITALS
BODY MASS INDEX: 26.75 KG/M2 | HEIGHT: 63 IN | HEART RATE: 92 BPM | RESPIRATION RATE: 16 BRPM | DIASTOLIC BLOOD PRESSURE: 89 MMHG | TEMPERATURE: 97 F | SYSTOLIC BLOOD PRESSURE: 156 MMHG | WEIGHT: 151 LBS | OXYGEN SATURATION: 96 %

## 2019-06-17 DIAGNOSIS — T63.481A INSECT STINGS, ACCIDENTAL OR UNINTENTIONAL, INITIAL ENCOUNTER: Primary | ICD-10-CM

## 2019-06-17 PROCEDURE — 90471 IMMUNIZATION ADMIN: CPT | Mod: S$GLB,,, | Performed by: NURSE PRACTITIONER

## 2019-06-17 PROCEDURE — 90471 TD VACCINE GREATER THAN OR EQUAL TO 7YO WITH PRESERVATIVE IM: ICD-10-PCS | Mod: S$GLB,,, | Performed by: NURSE PRACTITIONER

## 2019-06-17 PROCEDURE — 90714 TD VACCINE GREATER THAN OR EQUAL TO 7YO WITH PRESERVATIVE IM: ICD-10-PCS | Mod: S$GLB,,, | Performed by: NURSE PRACTITIONER

## 2019-06-17 PROCEDURE — 90714 TD VACC NO PRESV 7 YRS+ IM: CPT | Mod: S$GLB,,, | Performed by: NURSE PRACTITIONER

## 2019-06-17 PROCEDURE — 99204 PR OFFICE/OUTPT VISIT, NEW, LEVL IV, 45-59 MIN: ICD-10-PCS | Mod: 25,S$GLB,, | Performed by: NURSE PRACTITIONER

## 2019-06-17 PROCEDURE — 99204 OFFICE O/P NEW MOD 45 MIN: CPT | Mod: 25,S$GLB,, | Performed by: NURSE PRACTITIONER

## 2019-06-17 RX ORDER — DOXYCYCLINE HYCLATE 100 MG
100 TABLET ORAL 2 TIMES DAILY
Qty: 20 TABLET | Refills: 0 | Status: SHIPPED | OUTPATIENT
Start: 2019-06-17 | End: 2019-07-11 | Stop reason: ALTCHOICE

## 2019-06-17 NOTE — PATIENT INSTRUCTIONS
Insect Bite  Insects most often bite to protect themselves or their nests. Certain bugs, like fleas and mosquitoes, bite to feed. In some cases, the actual bite causes no pain. An itchy red welt or swelling may develop at the site of the bite. Most insect bites do not cause illness. And the itching and swelling most often go away without treatment. However, an infection can develop if the bite is scratched and the skin broken. Rarely, a person may have an allergic reaction to an insect bite.  If a stinger is visible at the bite spot, remove it as quickly as possible, as this can decrease the amount of venom that gets into your body. Scrape it out with a dull edge, such as the edge of a credit card. Try not to squeeze it. Do not try to dig it out, as you may damage the skin and also increase the chance of infection.     To help reduce swelling and itching, apply a cold pack or ice in a zip-top plastic bag wrapped in a thin towel.   Home care  · Your healthcare provider may prescribe over-the-counter medicines to help relieve itching and swelling. Use each medicine according to the directions on the package. If the bite becomes infected, you will need an antibiotic. This may be in pill form taken by mouth or as an ointment or cream put directly on the skin. Be sure to use them exactly as prescribed.  · Bite symptoms usually go away on their own within a week or two.  · To help prevent infection, avoid scratching or picking at the bite.  · To help relieve itching and swelling, apply ice in a zip-top plastic bag wrapped in a thin towel to the bites. Do this for up to 10 minutes at a time. Avoid hot showers or baths as these tend to make itching worse.  · An over-the-counter anti-itch medicine such as calamine lotion or an antihistamine cream may be helpful.  · If you suspect you have insects in your home, talk to a licensed pest-control professional. He or she can inspect your home and tell you how to get rid of bugs  safely.  Follow-up care  Follow up with your healthcare provider, or as advised.  Call 911  Call 911 if any of these occur:  · Trouble breathing or swallowing  · Wheezing  · Feeling like your throat is closing up  · Fainting, loss of consciousness  · Swelling around the face or mouth  When to seek medical advice  Call your healthcare provider right away if any of these occur:  · Fever of 100.4°F (38°C) or higher, or as directed by your healthcare provider  · Signs of infection, such as increased swelling and pain, warmth, red streaks, or drainage from the skin  · Signs of allergic reaction, such as hives, a spreading rash, or throat itching  Date Last Reviewed: 10/1/2016  © 5786-2550 [employer]. [address] [place] [address], [place] [address]. All rights reserved. This information is not intended as a substitute for professional medical care. Always follow your healthcare professional's instructions.

## 2019-06-17 NOTE — PROGRESS NOTES
"Subjective:       Patient ID: Alexa Otero is a 79 y.o. female.    Vitals:  height is 5' 3" (1.6 m) and weight is 68.5 kg (151 lb). Her oral temperature is 97.3 °F (36.3 °C). Her blood pressure is 156/89 (abnormal) and her pulse is 92. Her respiration is 16 and oxygen saturation is 96%.     Chief Complaint: Insect Bite    Insect bite to Rt 2nd toe 6/16/19, C/O red, swelling and warm to touch    Insect Bite   This is a new problem. The current episode started yesterday. Associated symptoms include a rash. Pertinent negatives include no arthralgias, chills, coughing, fever, joint swelling or sore throat. Treatments tried: benadryl cream.       Constitution: Negative for chills and fever.   HENT: Negative for facial swelling and sore throat.    Neck: Negative for painful lymph nodes.   Eyes: Negative for eye itching and eyelid swelling.   Respiratory: Negative for cough.    Musculoskeletal: Positive for pain. Negative for joint pain and joint swelling.   Skin: Positive for rash and erythema. Negative for color change, pale, wound, abrasion, laceration, lesion, skin thickening/induration, puncture wound, bruising, abscess, avulsion and hives.   Allergic/Immunologic: Negative for environmental allergies, immunocompromised state and hives.   Hematologic/Lymphatic: Negative for swollen lymph nodes.       Objective:      Physical Exam   Constitutional: She is oriented to person, place, and time. She appears well-developed and well-nourished.   HENT:   Head: Normocephalic and atraumatic. Head is without abrasion, without contusion and without laceration.   Right Ear: External ear normal.   Left Ear: External ear normal.   Nose: Nose normal.   Mouth/Throat: Oropharynx is clear and moist.   Eyes: Pupils are equal, round, and reactive to light. Conjunctivae, EOM and lids are normal.   Neck: Trachea normal, full passive range of motion without pain and phonation normal. Neck supple.   Cardiovascular: Normal rate, regular " rhythm and normal heart sounds.   Pulmonary/Chest: Effort normal and breath sounds normal. No stridor. No respiratory distress.   Musculoskeletal: Normal range of motion.        Feet:    Neurological: She is alert and oriented to person, place, and time.   Skin: Skin is warm, dry and intact. Capillary refill takes less than 2 seconds. No abrasion, no bruising, no burn, no ecchymosis, no laceration, no lesion and no rash noted. There is erythema.   Psychiatric: She has a normal mood and affect. Her speech is normal and behavior is normal. Judgment and thought content normal. Cognition and memory are normal.   Nursing note and vitals reviewed.      Assessment:       1. Insect stings, accidental or unintentional, initial encounter        Plan:         Insect stings, accidental or unintentional, initial encounter  -     (In Office Administered) Td Vaccine  -     doxycycline (VIBRA-TABS) 100 MG tablet; Take 1 tablet (100 mg total) by mouth 2 (two) times daily.  Dispense: 20 tablet; Refill: 0

## 2019-07-11 ENCOUNTER — OFFICE VISIT (OUTPATIENT)
Dept: DERMATOLOGY | Facility: CLINIC | Age: 79
End: 2019-07-11
Payer: MEDICARE

## 2019-07-11 VITALS — BODY MASS INDEX: 26.75 KG/M2 | HEIGHT: 63 IN | WEIGHT: 151 LBS

## 2019-07-11 DIAGNOSIS — L57.0 ACTINIC KERATOSES: Primary | ICD-10-CM

## 2019-07-11 DIAGNOSIS — D22.9 MULTIPLE BENIGN NEVI: ICD-10-CM

## 2019-07-11 DIAGNOSIS — L82.1 SEBORRHEIC KERATOSES: ICD-10-CM

## 2019-07-11 DIAGNOSIS — L82.0 INFLAMED SEBORRHEIC KERATOSIS: ICD-10-CM

## 2019-07-11 PROCEDURE — 17110 DESTRUCTION B9 LES UP TO 14: CPT | Mod: S$GLB,,, | Performed by: DERMATOLOGY

## 2019-07-11 PROCEDURE — 99213 OFFICE O/P EST LOW 20 MIN: CPT | Mod: 25,S$GLB,, | Performed by: DERMATOLOGY

## 2019-07-11 PROCEDURE — 1101F PT FALLS ASSESS-DOCD LE1/YR: CPT | Mod: CPTII,S$GLB,, | Performed by: DERMATOLOGY

## 2019-07-11 PROCEDURE — 17000 DESTRUCT PREMALG LESION: CPT | Mod: 59,S$GLB,, | Performed by: DERMATOLOGY

## 2019-07-11 PROCEDURE — 99213 PR OFFICE/OUTPT VISIT, EST, LEVL III, 20-29 MIN: ICD-10-PCS | Mod: 25,S$GLB,, | Performed by: DERMATOLOGY

## 2019-07-11 PROCEDURE — 1101F PR PT FALLS ASSESS DOC 0-1 FALLS W/OUT INJ PAST YR: ICD-10-PCS | Mod: CPTII,S$GLB,, | Performed by: DERMATOLOGY

## 2019-07-11 PROCEDURE — 17110 PR DESTRUCTION BENIGN LESIONS UP TO 14: ICD-10-PCS | Mod: S$GLB,,, | Performed by: DERMATOLOGY

## 2019-07-11 PROCEDURE — 99999 PR PBB SHADOW E&M-EST. PATIENT-LVL III: ICD-10-PCS | Mod: PBBFAC,,, | Performed by: DERMATOLOGY

## 2019-07-11 PROCEDURE — 99999 PR PBB SHADOW E&M-EST. PATIENT-LVL III: CPT | Mod: PBBFAC,,, | Performed by: DERMATOLOGY

## 2019-07-11 PROCEDURE — 17000 PR DESTRUCTION(LASER SURGERY,CRYOSURGERY,CHEMOSURGERY),PREMALIGNANT LESIONS,FIRST LESION: ICD-10-PCS | Mod: 59,S$GLB,, | Performed by: DERMATOLOGY

## 2019-07-11 NOTE — PATIENT INSTRUCTIONS

## 2019-07-11 NOTE — PROGRESS NOTES
Subjective:       Patient ID:  Alexa Otero is a 79 y.o. 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  Severity: mild  Timing: constant  Aggravated by: nothing  Relieving factors/Treatments tried: nothing        Review of Systems   Constitutional: Negative for fever, chills, weight loss, weight gain, fatigue, night sweats and malaise.   Skin: Positive for daily sunscreen use and activity-related sunscreen use. Negative for wears hat.   Hematologic/Lymphatic: Does not bruise/bleed easily.        Objective:    Physical Exam   Constitutional: She appears well-developed and well-nourished. No distress.   Neurological: She is alert and oriented to person, place, and time. She is not disoriented.   Psychiatric: She has a normal mood and affect.   Skin:   Areas Examined (abnormalities noted in diagram):   Scalp / Hair Palpated and Inspected  Head / Face Inspection Performed  Neck Inspection Performed  Chest / Axilla Inspection Performed  Abdomen Inspection Performed  Back Inspection Performed  RUE Inspected  LUE Inspection Performed  Nails and Digits Inspection Performed                       Diagram Legend     Erythematous scaling macule/papule c/w actinic keratosis       Vascular papule c/w angioma      Pigmented verrucoid papule/plaque c/w seborrheic keratosis      Yellow umbilicated papule c/w sebaceous hyperplasia      Irregularly shaped tan macule c/w lentigo     1-2 mm smooth white papules consistent with Milia      Movable subcutaneous cyst with punctum c/w epidermal inclusion cyst      Subcutaneous movable cyst c/w pilar cyst      Firm pink to brown papule c/w dermatofibroma      Pedunculated fleshy papule(s) c/w skin  tag(s)      Evenly pigmented macule c/w junctional nevus     Mildly variegated pigmented, slightly irregular-bordered macule c/w mildly atypical nevus      Flesh colored to evenly pigmented papule c/w intradermal nevus       Pink pearly papule/plaque c/w basal cell carcinoma      Erythematous hyperkeratotic cursted plaque c/w SCC      Surgical scar with no sign of skin cancer recurrence      Open and closed comedones      Inflammatory papules and pustules      Verrucoid papule consistent consistent with wart     Erythematous eczematous patches and plaques     Dystrophic onycholytic nail with subungual debris c/w onychomycosis     Umbilicated papule    Erythematous-base heme-crusted tan verrucoid plaque consistent with inflamed seborrheic keratosis     Erythematous Silvery Scaling Plaque c/w Psoriasis     See annotation              Assessment / Plan:        Actinic keratoses  Cryosurgery Procedure Note    Verbal consent from the patient is obtained and the patient is aware of the precancerous quality and need for treatment of these lesions. Liquid nitrogen cryosurgery is applied to the 1 actinic keratoses, as detailed in the physical exam, to produce a freeze injury. The patient is aware that blisters may form and is instructed on wound care with gentle cleansing and use of vaseline ointment to keep moist until healed. The patient is supplied a handout on cryosurgery and is instructed to call if lesions do not completely resolve.    Inflamed seborrheic keratosis, pruritic  Cryosurgery procedure note:    Verbal consent from the patient is obtained. Liquid nitrogen cryosurgery is applied to 6 lesions to produce a freeze injury. The patient is aware that blisters may form and is instructed on wound care with gentle cleansing and use of vaseline ointment to keep moist until healed. The patient is supplied a handout on cryosurgery and is instructed to call if lesions do not completely resolve.    Seborrheic  keratoses  These are benign inherited growths without a malignant potential. Reassurance given to patient. No treatment is necessary.     Multiple benign nevi  Monitor for new mole or moles that are becoming bigger, darker, irritated, or developing irregular borders.     Patient instructed in importance in daily sun protection of at least spf 30. Mineral sunscreen ingredients preferred (Zinc +/- Titanium).   Recommend Elta MD for daily use on face and neck.  Patient encouraged to wear hat for all outdoor exposure.   Also discussed sun avoidance and use of protective clothing.               Follow up if symptoms worsen or fail to improve.

## 2019-09-24 ENCOUNTER — OFFICE VISIT (OUTPATIENT)
Dept: ORTHOPEDICS | Facility: CLINIC | Age: 79
End: 2019-09-24
Payer: MEDICARE

## 2019-09-24 ENCOUNTER — HOSPITAL ENCOUNTER (OUTPATIENT)
Dept: RADIOLOGY | Facility: HOSPITAL | Age: 79
Discharge: HOME | End: 2019-09-24
Attending: ORTHOPAEDIC SURGERY
Payer: MEDICARE

## 2019-09-24 VITALS
SYSTOLIC BLOOD PRESSURE: 156 MMHG | WEIGHT: 151 LBS | BODY MASS INDEX: 26.75 KG/M2 | HEIGHT: 63 IN | DIASTOLIC BLOOD PRESSURE: 86 MMHG | HEART RATE: 85 BPM

## 2019-09-24 DIAGNOSIS — M75.81 ROTATOR CUFF TENDINITIS, RIGHT: Primary | ICD-10-CM

## 2019-09-24 DIAGNOSIS — M25.511 RIGHT SHOULDER PAIN, UNSPECIFIED CHRONICITY: Primary | ICD-10-CM

## 2019-09-24 DIAGNOSIS — M25.511 RIGHT SHOULDER PAIN, UNSPECIFIED CHRONICITY: ICD-10-CM

## 2019-09-24 PROCEDURE — 99214 PR OFFICE/OUTPT VISIT, EST, LEVL IV, 30-39 MIN: ICD-10-PCS | Mod: 25,S$GLB,, | Performed by: ORTHOPAEDIC SURGERY

## 2019-09-24 PROCEDURE — 3079F PR MOST RECENT DIASTOLIC BLOOD PRESSURE 80-89 MM HG: ICD-10-PCS | Mod: CPTII,S$GLB,, | Performed by: ORTHOPAEDIC SURGERY

## 2019-09-24 PROCEDURE — 99999 PR PBB SHADOW E&M-EST. PATIENT-LVL III: CPT | Mod: PBBFAC,,, | Performed by: ORTHOPAEDIC SURGERY

## 2019-09-24 PROCEDURE — 73030 X-RAY EXAM OF SHOULDER: CPT | Mod: TC,PN,RT

## 2019-09-24 PROCEDURE — 1101F PR PT FALLS ASSESS DOC 0-1 FALLS W/OUT INJ PAST YR: ICD-10-PCS | Mod: CPTII,S$GLB,, | Performed by: ORTHOPAEDIC SURGERY

## 2019-09-24 PROCEDURE — 3079F DIAST BP 80-89 MM HG: CPT | Mod: CPTII,S$GLB,, | Performed by: ORTHOPAEDIC SURGERY

## 2019-09-24 PROCEDURE — 73030 X-RAY EXAM OF SHOULDER: CPT | Mod: 26,RT,, | Performed by: RADIOLOGY

## 2019-09-24 PROCEDURE — 3077F SYST BP >= 140 MM HG: CPT | Mod: CPTII,S$GLB,, | Performed by: ORTHOPAEDIC SURGERY

## 2019-09-24 PROCEDURE — 1101F PT FALLS ASSESS-DOCD LE1/YR: CPT | Mod: CPTII,S$GLB,, | Performed by: ORTHOPAEDIC SURGERY

## 2019-09-24 PROCEDURE — 99999 PR PBB SHADOW E&M-EST. PATIENT-LVL III: ICD-10-PCS | Mod: PBBFAC,,, | Performed by: ORTHOPAEDIC SURGERY

## 2019-09-24 PROCEDURE — 99214 OFFICE O/P EST MOD 30 MIN: CPT | Mod: 25,S$GLB,, | Performed by: ORTHOPAEDIC SURGERY

## 2019-09-24 PROCEDURE — 20610 LARGE JOINT ASPIRATION/INJECTION: R SUBACROMIAL BURSA: ICD-10-PCS | Mod: RT,S$GLB,, | Performed by: ORTHOPAEDIC SURGERY

## 2019-09-24 PROCEDURE — 20610 DRAIN/INJ JOINT/BURSA W/O US: CPT | Mod: RT,S$GLB,, | Performed by: ORTHOPAEDIC SURGERY

## 2019-09-24 PROCEDURE — 73030 XR SHOULDER TRAUMA 3 VIEW RIGHT: ICD-10-PCS | Mod: 26,RT,, | Performed by: RADIOLOGY

## 2019-09-24 PROCEDURE — 3077F PR MOST RECENT SYSTOLIC BLOOD PRESSURE >= 140 MM HG: ICD-10-PCS | Mod: CPTII,S$GLB,, | Performed by: ORTHOPAEDIC SURGERY

## 2019-09-24 RX ORDER — METHYLPREDNISOLONE ACETATE 40 MG/ML
40 INJECTION, SUSPENSION INTRA-ARTICULAR; INTRALESIONAL; INTRAMUSCULAR; SOFT TISSUE
Status: DISCONTINUED | OUTPATIENT
Start: 2019-09-24 | End: 2019-09-24 | Stop reason: HOSPADM

## 2019-09-24 RX ADMIN — METHYLPREDNISOLONE ACETATE 40 MG: 40 INJECTION, SUSPENSION INTRA-ARTICULAR; INTRALESIONAL; INTRAMUSCULAR; SOFT TISSUE at 11:09

## 2019-09-24 NOTE — PROGRESS NOTES
CC:  79-year-old female presents for evaluation of right shoulder pain. Patient has pain in the right shoulder after waking up about 3 days ago having slept wrong on the right shoulder.  She is not having constant pain but is having sharp severe pain with certain motions.  She states that she will repeat that motion and then have no pain so it is very intermittent.    ROS:    Constitution: Denies chills, fever, and sweats.  HENT: Denies headaches or blurry vision.  Cardiovascular: Denies chest pain or irregular heart beat.  Respiratory: Denies cough or shortness of breath.  Gastrointestinal: Denies abdominal pain, nausea, or vomiting.  Genitourinary:  Denies urinary incontinence, bladder and kidney issues  Musculoskeletal:  Denies muscle cramps.  Positive for right shoulder pain  Neurological: Denies dizziness or focal weakness.  Psychiatric/Behavioral: Normal mental status.  Hematologic/Lymphatic: Denies bleeding problem or easy bruising/bleeding.  Skin: Denies rash or suspicious lesions.    Physical examination     Gen - No acute distress   Eyes - Extraoccular motions intact, pupils equally round and reactive to light and accommodation   ENT - normocephalic, atruamtic, oropharynx clear   Neck - Supple, no abnormal masses   Cardiovascular - regular rate and rhythm   Pulmonary - clear to auscultation bilaterally   Abdomen - soft, non-tender, non-distended, positive bowel sounds   Psych - The patient is alert and oriented x3 with normal mood and affect    Right Upper Extremity Examination     Skin is intact throughout   Motor is intact distally radial, median, ulnar, AIN, PIN   +2 radial and ulnar pulses   Sensation to light touch is intact distally radial, median, and ulnar     Examination of the Right shoulder:   ROM:   For - 150   Abd - 150   Ext - 50   Int - T12     Tenderness to palpation:   Subacromial space - positive  Biceps Tendon - negative  Anterior Glenohumeral Joint - negative  AC joint -  negative  Glenohumeral instability - negative  Empty Can test - negative  Speeds test - negative  Morales/Neers sign - negative  Cross-arm adduction test - negative    X-rays were examined and personally reviewed by me.  The humeral head is well reduced in the glenoid.  There are no acute fractures.  There is some mild arthritis at the AC joint. Overall normal bony anatomy.    Dx:  Rotator cuff tendinitis right shoulder    Plan:  Recommendations for steroid injection. The patient agreed we injected the right shoulder with a mixture of 2, 2, 1.  She tolerated well.  Follow up p.r.n..

## 2019-09-24 NOTE — PROCEDURES
Large Joint Aspiration/Injection: R subacromial bursa  Date/Time: 9/24/2019 11:00 AM  Performed by: Andrew Melendez II, MD  Authorized by: Andrew Melendez II, MD     Consent Done?:  Yes (Verbal)  Timeout: Prior to procedure the correct patient, procedure, and site was verified    Anesthesia  Local anesthesia used  Anesthetic: topical anesthetic    Location:  Shoulder  Site:  R subacromial bursa  Prep: Patient was prepped and draped in usual sterile fashion    Needle size:  22 G  Approach:  Lateral  Medications:  40 mg methylPREDNISolone acetate 40 mg/mL  Patient tolerance:  Patient tolerated the procedure well with no immediate complications

## 2019-11-22 ENCOUNTER — TELEPHONE (OUTPATIENT)
Dept: FAMILY MEDICINE | Facility: CLINIC | Age: 79
End: 2019-11-22

## 2019-11-23 RX ORDER — FAMOTIDINE 40 MG/1
40 TABLET, FILM COATED ORAL NIGHTLY PRN
Qty: 90 TABLET | Refills: 3 | Status: SHIPPED | OUTPATIENT
Start: 2019-11-23 | End: 2020-02-20 | Stop reason: SDUPTHER

## 2019-11-25 ENCOUNTER — LAB VISIT (OUTPATIENT)
Dept: LAB | Facility: HOSPITAL | Age: 79
End: 2019-11-25
Attending: FAMILY MEDICINE
Payer: MEDICARE

## 2019-11-25 DIAGNOSIS — E11.9 CONTROLLED TYPE 2 DIABETES MELLITUS WITHOUT COMPLICATION, WITHOUT LONG-TERM CURRENT USE OF INSULIN: ICD-10-CM

## 2019-11-25 LAB
ALBUMIN SERPL BCP-MCNC: 3.8 G/DL (ref 3.5–5.2)
ALP SERPL-CCNC: 40 U/L (ref 55–135)
ALT SERPL W/O P-5'-P-CCNC: 15 U/L (ref 10–44)
ANION GAP SERPL CALC-SCNC: 10 MMOL/L (ref 8–16)
AST SERPL-CCNC: 18 U/L (ref 10–40)
BASOPHILS # BLD AUTO: 0.04 K/UL (ref 0–0.2)
BASOPHILS NFR BLD: 0.5 % (ref 0–1.9)
BILIRUB SERPL-MCNC: 0.6 MG/DL (ref 0.1–1)
BUN SERPL-MCNC: 22 MG/DL (ref 8–23)
CALCIUM SERPL-MCNC: 9.5 MG/DL (ref 8.7–10.5)
CHLORIDE SERPL-SCNC: 104 MMOL/L (ref 95–110)
CO2 SERPL-SCNC: 28 MMOL/L (ref 23–29)
CREAT SERPL-MCNC: 0.9 MG/DL (ref 0.5–1.4)
DIFFERENTIAL METHOD: ABNORMAL
EOSINOPHIL # BLD AUTO: 0.2 K/UL (ref 0–0.5)
EOSINOPHIL NFR BLD: 2.3 % (ref 0–8)
ERYTHROCYTE [DISTWIDTH] IN BLOOD BY AUTOMATED COUNT: 13.4 % (ref 11.5–14.5)
EST. GFR  (AFRICAN AMERICAN): >60 ML/MIN/1.73 M^2
EST. GFR  (NON AFRICAN AMERICAN): >60 ML/MIN/1.73 M^2
GLUCOSE SERPL-MCNC: 100 MG/DL (ref 70–110)
HCT VFR BLD AUTO: 39 % (ref 37–48.5)
HGB BLD-MCNC: 12.2 G/DL (ref 12–16)
IMM GRANULOCYTES # BLD AUTO: 0.03 K/UL (ref 0–0.04)
IMM GRANULOCYTES NFR BLD AUTO: 0.4 % (ref 0–0.5)
LYMPHOCYTES # BLD AUTO: 1.9 K/UL (ref 1–4.8)
LYMPHOCYTES NFR BLD: 24.7 % (ref 18–48)
MCH RBC QN AUTO: 29 PG (ref 27–31)
MCHC RBC AUTO-ENTMCNC: 31.3 G/DL (ref 32–36)
MCV RBC AUTO: 93 FL (ref 82–98)
MONOCYTES # BLD AUTO: 0.6 K/UL (ref 0.3–1)
MONOCYTES NFR BLD: 8.1 % (ref 4–15)
NEUTROPHILS # BLD AUTO: 5 K/UL (ref 1.8–7.7)
NEUTROPHILS NFR BLD: 64 % (ref 38–73)
NRBC BLD-RTO: 0 /100 WBC
PLATELET # BLD AUTO: 253 K/UL (ref 150–350)
PMV BLD AUTO: 11 FL (ref 9.2–12.9)
POTASSIUM SERPL-SCNC: 3.5 MMOL/L (ref 3.5–5.1)
PROT SERPL-MCNC: 6.6 G/DL (ref 6–8.4)
RBC # BLD AUTO: 4.21 M/UL (ref 4–5.4)
SODIUM SERPL-SCNC: 142 MMOL/L (ref 136–145)
WBC # BLD AUTO: 7.86 K/UL (ref 3.9–12.7)

## 2019-11-25 PROCEDURE — 83036 HEMOGLOBIN GLYCOSYLATED A1C: CPT

## 2019-11-25 PROCEDURE — 80053 COMPREHEN METABOLIC PANEL: CPT

## 2019-11-25 PROCEDURE — 36415 COLL VENOUS BLD VENIPUNCTURE: CPT | Mod: PO

## 2019-11-25 PROCEDURE — 85025 COMPLETE CBC W/AUTO DIFF WBC: CPT

## 2019-11-26 ENCOUNTER — OFFICE VISIT (OUTPATIENT)
Dept: FAMILY MEDICINE | Facility: CLINIC | Age: 79
End: 2019-11-26
Payer: MEDICARE

## 2019-11-26 VITALS
SYSTOLIC BLOOD PRESSURE: 130 MMHG | HEIGHT: 63 IN | OXYGEN SATURATION: 96 % | WEIGHT: 149.5 LBS | DIASTOLIC BLOOD PRESSURE: 68 MMHG | TEMPERATURE: 98 F | BODY MASS INDEX: 26.49 KG/M2 | RESPIRATION RATE: 14 BRPM | HEART RATE: 89 BPM

## 2019-11-26 DIAGNOSIS — E78.5 HYPERLIPIDEMIA, UNSPECIFIED HYPERLIPIDEMIA TYPE: ICD-10-CM

## 2019-11-26 DIAGNOSIS — K21.9 GASTROESOPHAGEAL REFLUX DISEASE, ESOPHAGITIS PRESENCE NOT SPECIFIED: ICD-10-CM

## 2019-11-26 DIAGNOSIS — E11.9 CONTROLLED TYPE 2 DIABETES MELLITUS WITHOUT COMPLICATION, WITHOUT LONG-TERM CURRENT USE OF INSULIN: ICD-10-CM

## 2019-11-26 DIAGNOSIS — D75.1 POLYCYTHEMIA, SECONDARY: ICD-10-CM

## 2019-11-26 DIAGNOSIS — E55.9 VITAMIN D DEFICIENCY DISEASE: ICD-10-CM

## 2019-11-26 DIAGNOSIS — I10 ESSENTIAL HYPERTENSION: Primary | ICD-10-CM

## 2019-11-26 LAB
ESTIMATED AVG GLUCOSE: 128 MG/DL (ref 68–131)
HBA1C MFR BLD HPLC: 6.1 % (ref 4–5.6)

## 2019-11-26 PROCEDURE — 3078F DIAST BP <80 MM HG: CPT | Mod: CPTII,S$GLB,, | Performed by: FAMILY MEDICINE

## 2019-11-26 PROCEDURE — 1159F MED LIST DOCD IN RCRD: CPT | Mod: S$GLB,,, | Performed by: FAMILY MEDICINE

## 2019-11-26 PROCEDURE — 99214 PR OFFICE/OUTPT VISIT, EST, LEVL IV, 30-39 MIN: ICD-10-PCS | Mod: S$GLB,,, | Performed by: FAMILY MEDICINE

## 2019-11-26 PROCEDURE — 1126F PR PAIN SEVERITY QUANTIFIED, NO PAIN PRESENT: ICD-10-PCS | Mod: S$GLB,,, | Performed by: FAMILY MEDICINE

## 2019-11-26 PROCEDURE — 1101F PT FALLS ASSESS-DOCD LE1/YR: CPT | Mod: CPTII,S$GLB,, | Performed by: FAMILY MEDICINE

## 2019-11-26 PROCEDURE — 1159F PR MEDICATION LIST DOCUMENTED IN MEDICAL RECORD: ICD-10-PCS | Mod: S$GLB,,, | Performed by: FAMILY MEDICINE

## 2019-11-26 PROCEDURE — 99999 PR PBB SHADOW E&M-EST. PATIENT-LVL III: CPT | Mod: PBBFAC,,, | Performed by: FAMILY MEDICINE

## 2019-11-26 PROCEDURE — 1101F PR PT FALLS ASSESS DOC 0-1 FALLS W/OUT INJ PAST YR: ICD-10-PCS | Mod: CPTII,S$GLB,, | Performed by: FAMILY MEDICINE

## 2019-11-26 PROCEDURE — 99214 OFFICE O/P EST MOD 30 MIN: CPT | Mod: S$GLB,,, | Performed by: FAMILY MEDICINE

## 2019-11-26 PROCEDURE — 3078F PR MOST RECENT DIASTOLIC BLOOD PRESSURE < 80 MM HG: ICD-10-PCS | Mod: CPTII,S$GLB,, | Performed by: FAMILY MEDICINE

## 2019-11-26 PROCEDURE — 1126F AMNT PAIN NOTED NONE PRSNT: CPT | Mod: S$GLB,,, | Performed by: FAMILY MEDICINE

## 2019-11-26 PROCEDURE — 3075F SYST BP GE 130 - 139MM HG: CPT | Mod: CPTII,S$GLB,, | Performed by: FAMILY MEDICINE

## 2019-11-26 PROCEDURE — 99999 PR PBB SHADOW E&M-EST. PATIENT-LVL III: ICD-10-PCS | Mod: PBBFAC,,, | Performed by: FAMILY MEDICINE

## 2019-11-26 PROCEDURE — 3075F PR MOST RECENT SYSTOLIC BLOOD PRESS GE 130-139MM HG: ICD-10-PCS | Mod: CPTII,S$GLB,, | Performed by: FAMILY MEDICINE

## 2019-11-26 NOTE — PROGRESS NOTES
Subjective:       Patient ID: Alexa Otero is a 79 y.o. female.    Chief Complaint: Follow-up (6mth f/u hypertension / diabetes)    HPI  Review of Systems   Constitutional: Negative for fatigue and unexpected weight change.   Respiratory: Negative for chest tightness and shortness of breath.    Cardiovascular: Negative for chest pain, palpitations and leg swelling.   Gastrointestinal: Negative for abdominal pain.   Musculoskeletal: Negative for arthralgias.   Neurological: Negative for dizziness, syncope, light-headedness and headaches.       Patient Active Problem List   Diagnosis    Sciatica    Syncope and collapse    Polycythemia, secondary    Hyperlipemia    Diabetes mellitus type 2, controlled    HTN (hypertension)    Acute chest pain    Vitamin D deficiency disease    Gastroesophageal reflux disease    History of Kwon's esophagus    Pancreatic pseudocyst/cyst    Hepatic cyst    Low back pain radiating to both legs    Primary osteoarthritis of right ankle    Kwon's esophagus    Low back pain    Dupuytren's contracture of both hands     Patient is here for a chronic conditions follow up.    Reviewed labs 11/19   Objective:      Physical Exam   Constitutional: She is oriented to person, place, and time. She appears well-developed and well-nourished.   Cardiovascular: Normal rate, regular rhythm and normal heart sounds.   Pulmonary/Chest: Effort normal and breath sounds normal.   Musculoskeletal: She exhibits no edema.   Neurological: She is alert and oriented to person, place, and time.   Skin: Skin is warm and dry.   Psychiatric: She has a normal mood and affect.   Nursing note and vitals reviewed.      Assessment:       1. Essential hypertension    2. Hyperlipidemia, unspecified hyperlipidemia type    3. Polycythemia, secondary    4. Controlled type 2 diabetes mellitus without complication, without long-term current use of insulin    5. Vitamin D deficiency disease    6.  Gastroesophageal reflux disease, esophagitis presence not specified        Plan:         1. Essential hypertension  Controlled on current medications.  Continue current medications.      2. Hyperlipidemia, unspecified hyperlipidemia type  Controlled on current medications.  Continue current medications.    - Comprehensive metabolic panel; Future  - Lipid panel; Future    3. Polycythemia, secondary  Normal CBC. Cont to monitor    4. Controlled type 2 diabetes mellitus without complication, without long-term current use of insulin  Controlled on current medications.  Continue current medications.    - CBC auto differential; Future  - Hemoglobin A1c; Future  - Microalbumin/creatinine urine ratio; Future    5. Vitamin D deficiency disease  Screen and treat as indicated:    - Vitamin D; Future    6. Gastroesophageal reflux disease, esophagitis presence not specified  Controlled on current medications.  Continue current medications.          Time spent with patient: 20 minutes    Patient with be reevaluated in 6 months or sooner prn    Greater than 50% of this visit was spent counseling as described in above documentation:Yes

## 2019-12-03 ENCOUNTER — CLINICAL SUPPORT (OUTPATIENT)
Dept: URGENT CARE | Facility: CLINIC | Age: 79
End: 2019-12-03
Payer: MEDICARE

## 2019-12-03 VITALS
HEIGHT: 63 IN | SYSTOLIC BLOOD PRESSURE: 134 MMHG | HEART RATE: 103 BPM | OXYGEN SATURATION: 97 % | WEIGHT: 148 LBS | TEMPERATURE: 98 F | BODY MASS INDEX: 26.22 KG/M2 | RESPIRATION RATE: 16 BRPM | DIASTOLIC BLOOD PRESSURE: 82 MMHG

## 2019-12-03 DIAGNOSIS — J32.9 SINUSITIS, UNSPECIFIED CHRONICITY, UNSPECIFIED LOCATION: ICD-10-CM

## 2019-12-03 DIAGNOSIS — J40 BRONCHITIS: Primary | ICD-10-CM

## 2019-12-03 PROCEDURE — 82962 GLUCOSE BLOOD TEST: CPT | Mod: S$GLB,,, | Performed by: NURSE PRACTITIONER

## 2019-12-03 PROCEDURE — 99214 PR OFFICE/OUTPT VISIT, EST, LEVL IV, 30-39 MIN: ICD-10-PCS | Mod: 25,S$GLB,, | Performed by: NURSE PRACTITIONER

## 2019-12-03 PROCEDURE — 82962 PR  GLUCOSE BLOOD TEST: ICD-10-PCS | Mod: S$GLB,,, | Performed by: NURSE PRACTITIONER

## 2019-12-03 PROCEDURE — 99214 OFFICE O/P EST MOD 30 MIN: CPT | Mod: 25,S$GLB,, | Performed by: NURSE PRACTITIONER

## 2019-12-03 RX ORDER — DEXAMETHASONE SODIUM PHOSPHATE 4 MG/ML
8 INJECTION, SOLUTION INTRA-ARTICULAR; INTRALESIONAL; INTRAMUSCULAR; INTRAVENOUS; SOFT TISSUE
Status: DISCONTINUED | OUTPATIENT
Start: 2019-12-03 | End: 2020-10-15 | Stop reason: ALTCHOICE

## 2019-12-03 RX ORDER — BENZONATATE 100 MG/1
200 CAPSULE ORAL 3 TIMES DAILY PRN
Qty: 30 CAPSULE | Refills: 1 | Status: SHIPPED | OUTPATIENT
Start: 2019-12-03 | End: 2020-03-10

## 2019-12-03 RX ORDER — AZITHROMYCIN 250 MG/1
TABLET, FILM COATED ORAL
Qty: 6 TABLET | Refills: 0 | Status: SHIPPED | OUTPATIENT
Start: 2019-12-03 | End: 2020-03-10

## 2019-12-03 RX ORDER — PREDNISONE 20 MG/1
20 TABLET ORAL 2 TIMES DAILY
Qty: 10 TABLET | Refills: 0 | Status: SHIPPED | OUTPATIENT
Start: 2019-12-03 | End: 2019-12-08

## 2019-12-03 NOTE — PROGRESS NOTES
"Subjective:       Patient ID: Alexa Otero is a 79 y.o. female.    Vitals:  height is 5' 3" (1.6 m) and weight is 67.1 kg (148 lb). Her oral temperature is 97.9 °F (36.6 °C). Her blood pressure is 134/82 and her pulse is 103. Her respiration is 16 and oxygen saturation is 97%.     Chief Complaint: Cough    Pt presents with sinus congestion and cough with yellow sputum production x 2 days. Pt states symptoms are gradually worsening and not improving with OTC meds. She denies f/c/n/v.     Cough   This is a new problem. The current episode started yesterday. The problem has been rapidly worsening. The cough is productive of sputum. Associated symptoms include nasal congestion. Pertinent negatives include no chills, ear pain, eye redness, fever, hemoptysis, myalgias, rash, sore throat, shortness of breath or wheezing. She has tried OTC cough suppressant for the symptoms. The treatment provided mild relief. Her past medical history is significant for bronchitis.       Constitution: Positive for fatigue. Negative for chills, sweating and fever.   HENT: Positive for congestion and sinus pressure. Negative for ear pain, sinus pain, sore throat and voice change.    Neck: Negative for painful lymph nodes.   Eyes: Negative for eye redness.   Respiratory: Positive for cough and sputum production. Negative for chest tightness, bloody sputum, COPD, shortness of breath, stridor, wheezing and asthma.    Gastrointestinal: Negative for nausea and vomiting.   Musculoskeletal: Negative for muscle ache.   Skin: Negative for rash.   Allergic/Immunologic: Negative for seasonal allergies and asthma.   Hematologic/Lymphatic: Negative for swollen lymph nodes.       Objective:      Physical Exam   Constitutional: She is oriented to person, place, and time. She appears well-developed and well-nourished. She is cooperative.  Non-toxic appearance. She does not have a sickly appearance. She does not appear ill. No distress.   HENT:   Head: " Normocephalic and atraumatic.   Right Ear: Hearing, tympanic membrane, external ear and ear canal normal.   Left Ear: Hearing, tympanic membrane, external ear and ear canal normal.   Nose: Mucosal edema and rhinorrhea present. No nasal deformity. No epistaxis. Right sinus exhibits maxillary sinus tenderness. Right sinus exhibits no frontal sinus tenderness. Left sinus exhibits maxillary sinus tenderness. Left sinus exhibits no frontal sinus tenderness.   Mouth/Throat: Uvula is midline, oropharynx is clear and moist and mucous membranes are normal. No trismus in the jaw. Normal dentition. No uvula swelling. No oropharyngeal exudate, posterior oropharyngeal edema or posterior oropharyngeal erythema.   Eyes: Pupils are equal, round, and reactive to light. Conjunctivae, EOM and lids are normal. No scleral icterus.   Neck: Trachea normal, full passive range of motion without pain and phonation normal. Neck supple. No neck rigidity. No edema and no erythema present.   Cardiovascular: Normal rate, regular rhythm, normal heart sounds, intact distal pulses and normal pulses.   Pulmonary/Chest: Effort normal and breath sounds normal. No stridor. No respiratory distress. She has no decreased breath sounds. She has no wheezes. She has no rhonchi. She has no rales. She exhibits no tenderness.   Abdominal: Normal appearance.   Musculoskeletal: Normal range of motion. She exhibits no edema or deformity.   Neurological: She is alert and oriented to person, place, and time. She exhibits normal muscle tone. Coordination normal.   Skin: Skin is warm, dry, intact, not diaphoretic and not pale.   Psychiatric: She has a normal mood and affect. Her speech is normal and behavior is normal. Judgment and thought content normal. Cognition and memory are normal.   Nursing note and vitals reviewed.        Assessment:       1. Bronchitis    2. Sinusitis, unspecified chronicity, unspecified location        Plan:         Bronchitis  -     POCT  Glucose, Hand-Held Device    Sinusitis, unspecified chronicity, unspecified location    Other orders  -     dexamethasone injection 8 mg  -     predniSONE (DELTASONE) 20 MG tablet; Take 1 tablet (20 mg total) by mouth 2 (two) times daily. for 5 days  Dispense: 10 tablet; Refill: 0  -     azithromycin (Z-DEJAN) 250 MG tablet; Take 2 tablets by mouth on day 1; Take 1 tablet by mouth on days 2-5  Dispense: 6 tablet; Refill: 0  -     benzonatate (TESSALON PERLES) 100 MG capsule; Take 2 capsules (200 mg total) by mouth 3 (three) times daily as needed.  Dispense: 30 capsule; Refill: 1

## 2019-12-03 NOTE — PATIENT INSTRUCTIONS
What Is Acute Bronchitis?  Acute bronchitis is when the airways in your lungs (bronchial tubes) become red and swollen (inflamed). It is usually caused by a viral infection. But it can also occur because of a bacteria or allergen. Symptoms include a cough that produces yellow or greenish mucus and can last for days or sometimes weeks.  Inside healthy lungs    Air travels in and out of the lungs through the airways. The linings of these airways produce sticky mucus. This mucus traps particles that enter the lungs. Tiny structures called cilia then sweep the particles out of the airways.     Healthy airway: Airways are normally open. Air moves in and out easily.      Healthy cilia: Tiny, hairlike cilia sweep mucus and particles up and out of the airways.   Lungs with bronchitis  Bronchitis often occurs with a cold or the flu virus. The airways become inflamed (red and swollen). There is a deep hacking cough from the extra mucus. Other symptoms may include:  · Wheezing  · Chest discomfort  · Shortness of breath  · Mild fever  A second infection, this time due to bacteria, may then occur. And airways irritated by allergens or smoke are more likely to get infected.        Inflamed airway: Inflammation and extra mucus narrow the airway, causing shortness of breath.      Impaired cilia: Extra mucus impairs cilia, causing congestion and wheezing. Smoking makes the problem worse.   Making a diagnosis  A physical exam, health history, and certain tests help your healthcare provider make the diagnosis.  Health history  Your healthcare provider will ask you about your symptoms.  The exam  Your provider listens to your chest for signs of congestion. He or she may also check your ears, nose, and throat.  Possible tests  · A sputum test for bacteria. This requires a sample of mucus from your lungs.  · A nasal or throat swab. This tests to see if you have a bacterial infection.  · A chest X-ray. This is done if your healthcare  provider thinks you have pneumonia.  · Tests to check for an underlying condition. Other tests may be done to check for things such as allergies, asthma, or COPD (chronic obstructive pulmonary disease). You may need to see a specialist for more lung function testing.  Treating a cough  The main treatment for bronchitis is easing symptoms. Avoiding smoke, allergens, and other things that trigger coughing can often help. If the infection is bacterial, you may be given antibiotics. During the illness, it's important to get plenty of sleep. To ease symptoms:  · Dont smoke. Also avoid secondhand smoke.  · Use a humidifier. Or try breathing in steam from a hot shower. This may help loosen mucus.  · Drink a lot of water and juice. They can soothe the throat and may help thin mucus.  · Sit up or use extra pillows when in bed. This helps to lessen coughing and congestion.  · Ask your provider about using medicine. Ask about using cough medicine, pain and fever medicine, or a decongestant.  Antibiotics  Most cases of bronchitis are caused by cold or flu viruses. They dont need antibiotics to treat them, even if your mucus is thick and green or yellow. Antibiotics dont treat viral illness and antibiotics have not been shown to have any benefit in cases of acute bronchitis. Taking antibiotics when they are not needed increases your risk of getting an infection later that is antibiotic-resistant. Antibiotics can also cause severe cases of diarrhea that require other antibiotics to treat.  It is important that you accept your healthcare provider's opinion to not use antibiotics. Your provider will prescribe antibiotics if the infection is caused by bacteria. If they are prescribed:  · Take all of the medicine. Take the medicine until it is used up, even if symptoms have improved. If you dont, the bronchitis may come back.  · Take the medicines as directed. For instance, some medicines should be taken with food.  · Ask about  side effects. Ask your provider or pharmacist what side effects are common, and what to do about them.  Follow-up care  You should see your provider again in 2 to 3 weeks. By this time, symptoms should have improved. An infection that lasts longer may mean you have a more serious problem.  Prevention  · Avoid tobacco smoke. If you smoke, quit. Stay away from smoky places. Ask friends and family not to smoke around you, or in your home or car.  · Get checked for allergies.  · Ask your provider about getting a yearly flu shot. Also ask about pneumococcal or pneumonia shots.  · Wash your hands often. This helps reduce the chance of picking up viruses that cause colds and flu.  Call your healthcare provider if:  · Symptoms worsen, or you have new symptoms  · Breathing problems worsen or  become severe  · Symptoms dont get better within a week, or within 3 days of taking antibiotics   Date Last Reviewed: 2/1/2017  © 1785-2315 [employer]. [address] [place] [address]. All rights reserved. This information is not intended as a substitute for professional medical care. Always follow your healthcare professional's instructions.        Sinusitis (Antibiotic Treatment)    The sinuses are air-filled spaces within the bones of the face. They connect to the inside of the nose. Sinusitis is an inflammation of the tissue lining the sinus cavity. Sinus inflammation can occur during a cold. It can also be due to allergies to pollens and other particles in the air. Sinusitis can cause symptoms of sinus congestion and fullness. A sinus infection causes fever, headache and facial pain. There is often green or yellow drainage from the nose or into the back of the throat (post-nasal drip). You have been given antibiotics to treat this condition.  Home care:  · Take the full course of antibiotics as instructed. Do not stop taking them, even if you feel better.  · Drink plenty of water, hot tea, and other  liquids. This may help thin mucus. It also may promote sinus drainage.  · Heat may help soothe painful areas of the face. Use a towel soaked in hot water. Or,  the shower and direct the hot spray onto your face. Using a vaporizer along with a menthol rub at night may also help.   · An expectorant containing guaifenesin may help thin the mucus and promote drainage from the sinuses.  · Over-the-counter decongestants may be used unless a similar medicine was prescribed. Nasal sprays work the fastest. Use one that contains phenylephrine or oxymetazoline. First blow the nose gently. Then use the spray. Do not use these medicines more often than directed on the label or symptoms may get worse. You may also use tablets containing pseudoephedrine. Avoid products that combine ingredients, because side effects may be increased. Read labels. You can also ask the pharmacist for help. (NOTE: Persons with high blood pressure should not use decongestants. They can raise blood pressure.)  · Over-the-counter antihistamines may help if allergies contributed to your sinusitis.    · Do not use nasal rinses or irrigation during an acute sinus infection, unless told to by your health care provider. Rinsing may spread the infection to other sinuses.  · Use acetaminophen or ibuprofen to control pain, unless another pain medicine was prescribed. (If you have chronic liver or kidney disease or ever had a stomach ulcer, talk with your doctor before using these medicines. Aspirin should never be used in anyone under 18 years of age who is ill with a fever. It may cause severe liver damage.)  · Don't smoke. This can worsen symptoms.  Follow-up care  Follow up with your healthcare provider or our staff if you are not improving within the next week.  When to seek medical advice  Call your healthcare provider if any of these occur:  · Facial pain or headache becoming more severe  · Stiff neck  · Unusual drowsiness or confusion  · Swelling  of the forehead or eyelids  · Vision problems, including blurred or double vision  · Fever of 100.4ºF (38ºC) or higher, or as directed by your healthcare provider  · Seizure  · Breathing problems  · Symptoms not resolving within 10 days  Date Last Reviewed: 4/13/2015  © 6976-2908 [employer]. [address] [place] [address], [place] [address]. All rights reserved. This information is not intended as a substitute for professional medical care. Always follow your healthcare professional's instructions.

## 2019-12-11 ENCOUNTER — CLINICAL SUPPORT (OUTPATIENT)
Dept: URGENT CARE | Facility: CLINIC | Age: 79
End: 2019-12-11
Payer: MEDICARE

## 2019-12-11 VITALS
WEIGHT: 149 LBS | HEART RATE: 93 BPM | TEMPERATURE: 98 F | RESPIRATION RATE: 18 BRPM | DIASTOLIC BLOOD PRESSURE: 83 MMHG | HEIGHT: 63 IN | BODY MASS INDEX: 26.4 KG/M2 | SYSTOLIC BLOOD PRESSURE: 149 MMHG | OXYGEN SATURATION: 98 %

## 2019-12-11 DIAGNOSIS — R05.9 COUGH: Primary | ICD-10-CM

## 2019-12-11 PROCEDURE — 99214 OFFICE O/P EST MOD 30 MIN: CPT | Mod: 25,S$GLB,, | Performed by: NURSE PRACTITIONER

## 2019-12-11 PROCEDURE — 71046 PR XRAY, CHEST, 2 VIEWS: ICD-10-PCS | Mod: S$GLB,,, | Performed by: NURSE PRACTITIONER

## 2019-12-11 PROCEDURE — 71046 X-RAY EXAM CHEST 2 VIEWS: CPT | Mod: S$GLB,,, | Performed by: NURSE PRACTITIONER

## 2019-12-11 PROCEDURE — 99214 PR OFFICE/OUTPT VISIT, EST, LEVL IV, 30-39 MIN: ICD-10-PCS | Mod: 25,S$GLB,, | Performed by: NURSE PRACTITIONER

## 2019-12-11 RX ORDER — ALBUTEROL SULFATE 90 UG/1
2 AEROSOL, METERED RESPIRATORY (INHALATION) EVERY 6 HOURS PRN
Qty: 1 INHALER | Refills: 0 | Status: SHIPPED | OUTPATIENT
Start: 2019-12-11 | End: 2020-03-10

## 2019-12-11 NOTE — PROGRESS NOTES
"Subjective:       Patient ID: Alexa Otero is a 79 y.o. female.    Vitals:  height is 5' 3" (1.6 m) and weight is 67.6 kg (149 lb). Her temperature is 98.1 °F (36.7 °C). Her blood pressure is 149/83 (abnormal) and her pulse is 93. Her respiration is 18 and oxygen saturation is 98%.     Chief Complaint: Follow-up    Here last week dx with bronchitis , prescribed prednisone and z-christophe commpleted all. Is still having cough congestion, runny nose, and now ear pain     Follow-up   Associated symptoms include coughing.       Constitution: Negative.   HENT: Positive for hearing loss.    Neck: negative.   Cardiovascular: Negative.    Eyes: Negative.    Respiratory: Positive for cough. Negative for sputum production, COPD, shortness of breath and wheezing.    Genitourinary: Negative.    Musculoskeletal: Negative.    Skin: Negative.  Negative for erythema.   Neurological: Negative.    Psychiatric/Behavioral: Negative.        Objective:      Physical Exam   Constitutional: She is oriented to person, place, and time. She appears well-developed and well-nourished. No distress.   HENT:   Head: Normocephalic.   Right Ear: External ear normal.   Left Ear: External ear normal.   Mouth/Throat: Oropharynx is clear and moist.   Eyes: Pupils are equal, round, and reactive to light. Conjunctivae and EOM are normal.   Neck: Neck supple.   Cardiovascular: Normal rate, regular rhythm, normal heart sounds and intact distal pulses. Exam reveals no gallop and no friction rub.   No murmur heard.  Pulmonary/Chest: Effort normal and breath sounds normal. No stridor. No respiratory distress. She has no wheezes. She has no rales.   Abdominal: Soft. Bowel sounds are normal. She exhibits no distension. There is no tenderness. There is no guarding.   Musculoskeletal: Normal range of motion. She exhibits no edema, tenderness or deformity.   Lymphadenopathy:     She has no cervical adenopathy.   Neurological: She is alert and oriented to person, place, " and time.   Skin: Skin is warm, dry, not diaphoretic, not pale and no rash. Capillary refill takes less than 2 seconds. erythema  Psychiatric: She has a normal mood and affect. Her behavior is normal. Judgment and thought content normal.   Nursing note and vitals reviewed.        Assessment:       1. Cough        Plan:     Vitals stable. Lungs clear. CXR negative. No hypoxia or respiratory distress. Still having mild nonproductive cough. Has bilateral cerumen impactions; however she has an appointment with ENT in 2 weeks. Will prescribe albuterol mdi to help with cough. Recommend ER for worsening.     Cough  -     XR CHEST PA AND LATERAL; Future; Expected date: 12/11/2019  -     albuterol (PROVENTIL HFA) 90 mcg/actuation inhaler; Inhale 2 puffs into the lungs every 6 (six) hours as needed for Wheezing. Rescue  Dispense: 1 Inhaler; Refill: 0

## 2019-12-11 NOTE — PATIENT INSTRUCTIONS
Cough, Chronic, Uncertain Cause (Adult)    Everyone has had a cough as part of the common cold, flu, or bronchitis. This kind of cough occurs along with an achy feeling, low-grade fever, nasal and sinus congestion, and a scratchy or sore throat. This usually gets better in 2 to 3 weeks. A cough that lasts longer than 3 weeks may be due to other causes.  If your cough does not improve over the next 2 weeks, further testing may be needed. Follow up with your healthcare provider as advised. Cough suppressants may be recommended. Based on your exam today, the exact cause of your cough is not certain. Below are some common causes for persistent cough.  Smokers cough  Smokers cough doesnt go away. If you continue to smoke, it only gets worse. The cough is from irritation in the air passages. Talk to your healthcare provider about quitting. Medicines or nicotine-replacement products, like gum or the patch, may make quitting easier.  Postnasal drip  A cough that is worse at night may be due to postnasal drip. Excess mucus in the nose drains from the back of your nose to your throat. This triggers the cough reflex. Postnasal drip may be due to a sinus infection or allergy. Common allergens include dust, tobacco smoke (both inhaled and secondhand smoke), environmental pollutants, pollen, mold, pets, cleaning agents, room deodorizers, and chemical fumes. Over-the-counter antihistamines or decongestants may be helpful for allergies. A sinus infection may requires antibiotic treatment. See your healthcare provider if symptoms continue.  Medicines  Certain prescribed medicines can cause a chronic cough in some people:  · ACE inhibitors for high blood pressure. These include benazepril, captopril, enalapril, fosinopril, lisinopril, quinapril, ramipril, and others.  · Beta-blockers for high blood pressure and other conditions. These include propranolol, atenolol, metoprolol, nadolol, and others.  Let your healthcare provider  know if you are taking any of these.  Asthma  Cough may be the only sign of mild asthma. You may have tests to find out if asthma is causing your cough. You may also take asthma medicine on a trial basis.  Acid reflux (heartburn, GERD)  The esophagus is the tube that carries food from the mouth to the stomach. A valve at its lower end prevents stomach acids from flowing upward. If this valve does not work properly, acid from the stomach enters the esophagus. This may cause a burning pain in the upper abdomen or lower chest, belching, or cough. Symptoms are often worse when lying flat. Avoid eating or drinking before bedtime. Try using extra pillows to raise your upper body, or place 4-inch blocks under the head of your bed. You may try an over-the-counter antacid or an acid-blocking medicine such as famotidine, cimetidine, ranitidine, esomeprazole, lansoprazole, or omeprazole. Stronger medicines for this condition can be prescribed by your healthcare provider.  Follow-up care  Follow up with your healthcare provider, or as advised, if your cough does not improve. Further testing may be needed.  Note: If an X-ray was taken, a specialist will review it. You will be notified of any new findings that may affect your care.  When to seek medical advice  Call your healthcare provider right away if any of these occur:  · Mild wheezing or difficulty breathing  · Fever of 100.4ºF (38ºC) or higher, or as directed by your healthcare provider  · Unexpected weight loss  · Coughing up large amounts of colored sputum  · Night sweats (sheets and pajamas get soaking wet)  Call 911, or get immediate medical care  Contact emergency services right away if any of these occur:  · Coughing up blood  · Moderate to severe trouble breathing or wheezing  Date Last Reviewed: 9/13/2015  © 0676-3713 [employer]. [address] [place] [address], [place] [address]. All rights reserved. This information is not intended as a substitute for  professional medical care. Always follow your healthcare professional's instructions.

## 2019-12-23 ENCOUNTER — OFFICE VISIT (OUTPATIENT)
Dept: OTOLARYNGOLOGY | Facility: CLINIC | Age: 79
End: 2019-12-23
Payer: MEDICARE

## 2019-12-23 VITALS — WEIGHT: 149.06 LBS | HEIGHT: 63 IN | BODY MASS INDEX: 26.41 KG/M2

## 2019-12-23 DIAGNOSIS — H61.23 BILATERAL IMPACTED CERUMEN: Primary | ICD-10-CM

## 2019-12-23 DIAGNOSIS — R09.82 POST-NASAL DRIP: ICD-10-CM

## 2019-12-23 PROCEDURE — 1101F PR PT FALLS ASSESS DOC 0-1 FALLS W/OUT INJ PAST YR: ICD-10-PCS | Mod: CPTII,S$GLB,, | Performed by: NURSE PRACTITIONER

## 2019-12-23 PROCEDURE — 1159F PR MEDICATION LIST DOCUMENTED IN MEDICAL RECORD: ICD-10-PCS | Mod: S$GLB,,, | Performed by: NURSE PRACTITIONER

## 2019-12-23 PROCEDURE — 69210 REMOVE IMPACTED EAR WAX UNI: CPT | Mod: S$GLB,,, | Performed by: NURSE PRACTITIONER

## 2019-12-23 PROCEDURE — 99999 PR PBB SHADOW E&M-EST. PATIENT-LVL III: CPT | Mod: PBBFAC,,, | Performed by: NURSE PRACTITIONER

## 2019-12-23 PROCEDURE — 1159F MED LIST DOCD IN RCRD: CPT | Mod: S$GLB,,, | Performed by: NURSE PRACTITIONER

## 2019-12-23 PROCEDURE — 69210 PR REMOVAL IMPACTED CERUMEN REQUIRING INSTRUMENTATION, UNILATERAL: ICD-10-PCS | Mod: S$GLB,,, | Performed by: NURSE PRACTITIONER

## 2019-12-23 PROCEDURE — 99999 PR PBB SHADOW E&M-EST. PATIENT-LVL III: ICD-10-PCS | Mod: PBBFAC,,, | Performed by: NURSE PRACTITIONER

## 2019-12-23 PROCEDURE — 99203 OFFICE O/P NEW LOW 30 MIN: CPT | Mod: 25,S$GLB,, | Performed by: NURSE PRACTITIONER

## 2019-12-23 PROCEDURE — 1126F AMNT PAIN NOTED NONE PRSNT: CPT | Mod: S$GLB,,, | Performed by: NURSE PRACTITIONER

## 2019-12-23 PROCEDURE — 1126F PR PAIN SEVERITY QUANTIFIED, NO PAIN PRESENT: ICD-10-PCS | Mod: S$GLB,,, | Performed by: NURSE PRACTITIONER

## 2019-12-23 PROCEDURE — 1101F PT FALLS ASSESS-DOCD LE1/YR: CPT | Mod: CPTII,S$GLB,, | Performed by: NURSE PRACTITIONER

## 2019-12-23 PROCEDURE — 99203 PR OFFICE/OUTPT VISIT, NEW, LEVL III, 30-44 MIN: ICD-10-PCS | Mod: 25,S$GLB,, | Performed by: NURSE PRACTITIONER

## 2019-12-23 NOTE — PROGRESS NOTES
Subjective:       Patient ID: Alexa Otero is a 79 y.o. female.    Chief Complaint: Cerumen Impaction    HPI   Patient was recently told at a check-up that she needs to have her ears cleaned. She denies aural fullness, otalgia, otorrhea, hearing loss, or any other ENT symptom or concern at this time.     Review of Systems   Constitutional: Negative.    HENT: Positive for postnasal drip. Negative for congestion and rhinorrhea.    Eyes: Negative.    Respiratory: Positive for cough (spits up phlegm).    Cardiovascular: Negative.    Gastrointestinal: Negative.    Musculoskeletal: Negative.    Skin: Negative.    Neurological: Negative.    Hematological: Negative.    Psychiatric/Behavioral: Negative.        Objective:      Physical Exam   Constitutional: She is oriented to person, place, and time. Vital signs are normal. She appears well-developed and well-nourished. She is cooperative. She does not appear ill. No distress.   HENT:   Head: Normocephalic and atraumatic.   Right Ear: Hearing, tympanic membrane, external ear and ear canal normal. Tympanic membrane is not erythematous. No middle ear effusion.   Left Ear: Hearing, tympanic membrane, external ear and ear canal normal. Tympanic membrane is not erythematous.  No middle ear effusion.   Nose: Nose normal.   Mouth/Throat: Uvula is midline, oropharynx is clear and moist and mucous membranes are normal.     SEPARATE PROCEDURE IN OFFICE:   Procedure: Removal of impacted cerumen, bilateral   Pre Procedure Diagnosis: Cerumen Impaction   Post Procedure Diagnosis: Cerumen Impaction   Verbal informed consent in regards to risk of trauma to ear canal, ear drum or hearing, discomfort during procedure and/or inability to remove cerumen impaction in one session or unforeseen events or complications.   No anesthesia.     Procedure in detail:   Ear canal visualized bilateral with appropriate size ear speculum utilizing Operating Head Binocular Otomicroscope   Utilizing the  following:  Ring curet, delicate alligator forceps, and/or suction cannula was used. The impacted cerumen of the ear canals was removed atraumatically. The TM and EAC were then inspected and found to be clear of wax. See description of TMs/EACs in PE above.   Complications: No   Condition: Improved/Good     Eyes: EOM and lids are normal. Right eye exhibits no discharge. Left eye exhibits no discharge. No scleral icterus.   Neck: Trachea normal and normal range of motion. Neck supple. No tracheal deviation present.   Cardiovascular: Normal rate.   Pulmonary/Chest: Effort normal. No stridor. No respiratory distress. She has no wheezes.   Musculoskeletal: Normal range of motion.   Neurological: She is alert and oriented to person, place, and time. She has normal strength. Coordination and gait normal.   Skin: Skin is warm, dry and intact. She is not diaphoretic. No cyanosis. No pallor.   Psychiatric: She has a normal mood and affect. Her speech is normal and behavior is normal. Judgment and thought content normal. Cognition and memory are normal.   Nursing note and vitals reviewed.      Assessment:     Bilateral cerumen impactions removed    PND  Plan:     Recommend [facility] sinus rinsing (brochure and instructions given)    Return as needed for any further ENT symptoms or concerns

## 2020-01-16 ENCOUNTER — TELEPHONE (OUTPATIENT)
Dept: FAMILY MEDICINE | Facility: CLINIC | Age: 80
End: 2020-01-16

## 2020-01-16 DIAGNOSIS — Z12.31 ENCOUNTER FOR SCREENING MAMMOGRAM FOR BREAST CANCER: Primary | ICD-10-CM

## 2020-01-16 NOTE — TELEPHONE ENCOUNTER
----- Message from Yenifer Vernon sent at 1/16/2020  3:34 PM CST -----  Contact: Valentina with [facility] radiology  Type: Needs Medical Advice    Who Called:  Valentina with [facility]   Symptoms (please be specific):    How long has patient had these symptoms:    Pharmacy name and phone #:    Best Call Back Number: [phone]  Additional Information: Valentina is calling regarding the code for the mammogram. Please change to code Z12.31 it will not let her use the code sent. Please call valentina if any questions. Thanks!

## 2020-01-27 ENCOUNTER — HOSPITAL ENCOUNTER (OUTPATIENT)
Dept: RADIOLOGY | Facility: HOSPITAL | Age: 80
Discharge: HOME | End: 2020-01-27
Attending: FAMILY MEDICINE
Payer: MEDICARE

## 2020-01-27 VITALS — WEIGHT: 148.38 LBS | BODY MASS INDEX: 26.29 KG/M2 | HEIGHT: 63 IN

## 2020-01-27 DIAGNOSIS — Z12.31 ENCOUNTER FOR SCREENING MAMMOGRAM FOR BREAST CANCER: ICD-10-CM

## 2020-01-27 PROCEDURE — 77067 SCR MAMMO BI INCL CAD: CPT | Mod: TC,PO

## 2020-02-20 RX ORDER — FAMOTIDINE 40 MG/1
40 TABLET, FILM COATED ORAL NIGHTLY PRN
Qty: 90 TABLET | Refills: 3 | Status: SHIPPED | OUTPATIENT
Start: 2020-02-20 | End: 2020-02-21 | Stop reason: SDUPTHER

## 2020-02-21 RX ORDER — FAMOTIDINE 40 MG/1
40 TABLET, FILM COATED ORAL NIGHTLY PRN
Qty: 90 TABLET | Refills: 3 | Status: SHIPPED | OUTPATIENT
Start: 2020-02-21 | End: 2020-08-31 | Stop reason: SDUPTHER

## 2020-02-22 ENCOUNTER — TELEPHONE (OUTPATIENT)
Dept: FAMILY MEDICINE | Facility: CLINIC | Age: 80
End: 2020-02-22

## 2020-02-22 NOTE — TELEPHONE ENCOUNTER
----- Message from Beatriz Stein sent at 2/21/2020  4:48 PM CST -----  Contact: pt  Type:  Patient Returning Call    Who Called:  pt  Does the patient know what this is regarding?:  prescription error  Best Call Back Number:    Additional Information:  Please give pt a call back as soon as possible to advise and assist with error on prescription. Thank you

## 2020-03-09 ENCOUNTER — TELEPHONE (OUTPATIENT)
Dept: FAMILY MEDICINE | Facility: CLINIC | Age: 80
End: 2020-03-09

## 2020-03-09 NOTE — TELEPHONE ENCOUNTER
Called pt and offered her an appointment with Dr. Mojica on 3/10/20 @ 9:40 AM. Pt verbalized understanding.

## 2020-03-09 NOTE — TELEPHONE ENCOUNTER
----- Message from Arlyn Hollanddebbie sent at 3/9/2020  3:33 PM CDT -----  Contact: Self  Type:  Sooner Apoointment Request    Caller is requesting a sooner appointment.  Caller declined first available appointment listed below.  Caller will not accept being placed on the waitlist and is requesting a message be sent to doctor.    Name of Caller:  patient  When is the first available appointment?  Next Monday  Symptoms:  Fell on both knees and someone just called her and scheduled an appt for this Wednesday but she just realized she can't make it on Wednesday.  Please call back to reschedule she refused Monday because she said you had a lot more  Best Call Back Number:  [phone]  Additional Information:  na

## 2020-03-10 ENCOUNTER — OFFICE VISIT (OUTPATIENT)
Dept: FAMILY MEDICINE | Facility: CLINIC | Age: 80
End: 2020-03-10
Payer: MEDICARE

## 2020-03-10 ENCOUNTER — HOSPITAL ENCOUNTER (OUTPATIENT)
Dept: RADIOLOGY | Facility: CLINIC | Age: 80
Discharge: HOME | End: 2020-03-10
Attending: FAMILY MEDICINE
Payer: MEDICARE

## 2020-03-10 VITALS
BODY MASS INDEX: 26.45 KG/M2 | WEIGHT: 149.25 LBS | TEMPERATURE: 98 F | DIASTOLIC BLOOD PRESSURE: 60 MMHG | HEART RATE: 89 BPM | HEIGHT: 63 IN | OXYGEN SATURATION: 98 % | SYSTOLIC BLOOD PRESSURE: 122 MMHG

## 2020-03-10 DIAGNOSIS — M17.12 PRIMARY OSTEOARTHRITIS OF LEFT KNEE: ICD-10-CM

## 2020-03-10 DIAGNOSIS — M25.561 ACUTE PAIN OF BOTH KNEES: ICD-10-CM

## 2020-03-10 DIAGNOSIS — M25.562 ACUTE PAIN OF BOTH KNEES: ICD-10-CM

## 2020-03-10 DIAGNOSIS — M23.303 DEGENERATION DISEASE OF MEDIAL MENISCUS, RIGHT: ICD-10-CM

## 2020-03-10 DIAGNOSIS — M17.11 PRIMARY OSTEOARTHRITIS OF RIGHT KNEE: Primary | ICD-10-CM

## 2020-03-10 PROCEDURE — 73564 X-RAY EXAM KNEE 4 OR MORE: CPT | Mod: 26,50,S$GLB, | Performed by: RADIOLOGY

## 2020-03-10 PROCEDURE — 99999 PR PBB SHADOW E&M-EST. PATIENT-LVL IV: ICD-10-PCS | Mod: PBBFAC,,, | Performed by: FAMILY MEDICINE

## 2020-03-10 PROCEDURE — 3074F PR MOST RECENT SYSTOLIC BLOOD PRESSURE < 130 MM HG: ICD-10-PCS | Mod: S$GLB,,, | Performed by: FAMILY MEDICINE

## 2020-03-10 PROCEDURE — 99214 PR OFFICE/OUTPT VISIT, EST, LEVL IV, 30-39 MIN: ICD-10-PCS | Mod: S$GLB,,, | Performed by: FAMILY MEDICINE

## 2020-03-10 PROCEDURE — 3288F FALL RISK ASSESSMENT DOCD: CPT | Mod: S$GLB,,, | Performed by: FAMILY MEDICINE

## 2020-03-10 PROCEDURE — 1125F AMNT PAIN NOTED PAIN PRSNT: CPT | Mod: S$GLB,,, | Performed by: FAMILY MEDICINE

## 2020-03-10 PROCEDURE — 3074F SYST BP LT 130 MM HG: CPT | Mod: S$GLB,,, | Performed by: FAMILY MEDICINE

## 2020-03-10 PROCEDURE — 73564 XR KNEE ORTHO BILAT WITH FLEXION: ICD-10-PCS | Mod: 26,50,S$GLB, | Performed by: RADIOLOGY

## 2020-03-10 PROCEDURE — 1100F PR PT FALLS ASSESS DOC 2+ FALLS/FALL W/INJURY/YR: ICD-10-PCS | Mod: S$GLB,,, | Performed by: FAMILY MEDICINE

## 2020-03-10 PROCEDURE — 1159F PR MEDICATION LIST DOCUMENTED IN MEDICAL RECORD: ICD-10-PCS | Mod: S$GLB,,, | Performed by: FAMILY MEDICINE

## 2020-03-10 PROCEDURE — 73564 X-RAY EXAM KNEE 4 OR MORE: CPT | Mod: TC,50,FY,PO

## 2020-03-10 PROCEDURE — 3078F DIAST BP <80 MM HG: CPT | Mod: S$GLB,,, | Performed by: FAMILY MEDICINE

## 2020-03-10 PROCEDURE — 3078F PR MOST RECENT DIASTOLIC BLOOD PRESSURE < 80 MM HG: ICD-10-PCS | Mod: S$GLB,,, | Performed by: FAMILY MEDICINE

## 2020-03-10 PROCEDURE — 3288F PR FALLS RISK ASSESSMENT DOCUMENTED: ICD-10-PCS | Mod: S$GLB,,, | Performed by: FAMILY MEDICINE

## 2020-03-10 PROCEDURE — 1100F PTFALLS ASSESS-DOCD GE2>/YR: CPT | Mod: S$GLB,,, | Performed by: FAMILY MEDICINE

## 2020-03-10 PROCEDURE — 99999 PR PBB SHADOW E&M-EST. PATIENT-LVL IV: CPT | Mod: PBBFAC,,, | Performed by: FAMILY MEDICINE

## 2020-03-10 PROCEDURE — 1125F PR PAIN SEVERITY QUANTIFIED, PAIN PRESENT: ICD-10-PCS | Mod: S$GLB,,, | Performed by: FAMILY MEDICINE

## 2020-03-10 PROCEDURE — 1159F MED LIST DOCD IN RCRD: CPT | Mod: S$GLB,,, | Performed by: FAMILY MEDICINE

## 2020-03-10 PROCEDURE — 99214 OFFICE O/P EST MOD 30 MIN: CPT | Mod: S$GLB,,, | Performed by: FAMILY MEDICINE

## 2020-03-10 RX ORDER — MELOXICAM 7.5 MG/1
7.5 TABLET ORAL DAILY
Qty: 30 TABLET | Refills: 1 | Status: SHIPPED | OUTPATIENT
Start: 2020-03-10 | End: 2020-03-10

## 2020-03-10 RX ORDER — MELOXICAM 7.5 MG/1
TABLET ORAL
Qty: 90 TABLET | Refills: 1 | Status: SHIPPED | OUTPATIENT
Start: 2020-03-10 | End: 2020-09-18 | Stop reason: CLARIF

## 2020-03-10 NOTE — PATIENT INSTRUCTIONS
Understanding Meniscal Tears    The meniscus is a tough cushion of fibrous tissue called cartilage in the knee joint. It cushions the knee. It absorbs shock and helps spread weight across the knee joint. It also works with other parts of the knee to help keep the joint stable. Injury or aging can cause the meniscus to tear and lead to pain and problems using the knee.   What causes meniscal tears?  A sudden injury can tear the meniscus. This is often because of planting the foot and twisting the knee. Sports such as soccer, football, and basketball are often involved. Repeated actions, such as squatting, may also lead to a tear. Breakdown of the meniscus because of aging can also lead to tears.  Symptoms of meniscal tears  These can include:  · Knee pain  · Knee swelling  · Catching of the knee or inability to straighten the knee  · Unstable feeling in the knee  Treatment for meniscal tears  A tear is unlikely to heal on its own. You often will need surgery to repair a tear. In many cases, your healthcare provider will first try treatments to help relieve symptoms. These may include:  · Rest the knee. This means avoiding any activity that puts stress on the knee joint. These include kneeling, squatting, jogging, and climbing stairs. In some cases, you may need to use crutches for a time to keep body weight off of the knee joint.  · Cold pack. Putting a cold pack on the knee helps reduce pain and swelling.  · Knee brace. Bracing the knee helps support it.  · Medicine. Prescription and over-the-counter pain medicines can help relieve swelling and pain.  · Exercises. Exercises help strengthen the muscles of the leg to help support the knee joint.  If these treatments dont help relieve symptoms or the injury is severe, you may need surgery. This can repair the meniscus to relieve symptoms and restore movement.     When to call your healthcare provider  Call your healthcare provider right away if you have any of  these:  · Fever of 100.4°F (38°C) or higher, or as directed  · Pain or swelling that gets worse, including pain in the calf  · Numbness or tingling in leg or foot  · You suddenly cant put any weight on your leg  · Your knee locks   Date Last Reviewed: 3/10/2016  © 3044-2210 [employer]. [address] [place] [address]. All rights reserved. This information is not intended as a substitute for professional medical care. Always follow your healthcare professional's instructions.

## 2020-03-10 NOTE — PROGRESS NOTES
Subjective:          Chief Complaint: Alexa Otero is a 80 y.o. female who had concerns including Pain and Swelling of the Right Knee and Knee Pain (left dull ache).    Patient here today with complaints of pain in both knees.  She states 2 weeks ago she was in [place] visiting her son that she was bringing in his garbage cans and she fell landing on both knees.  She felt pain in the anterior aspects of the knees and noted some swelling and bruising in the area.  Since that time knees have gradually improved with the left 1 improving more than the right.  She has been using heat and ice as well as over-the-counter Tylenol to help with some relief.  She still has swelling of the right knee despite continuous icing.          Review of Systems   Constitution: Negative for chills and decreased appetite.   HENT: Negative for congestion and sore throat.    Eyes: Negative for blurred vision.   Cardiovascular: Negative for chest pain, dyspnea on exertion and palpitations.   Respiratory: Negative for cough and shortness of breath.    Skin: Negative for rash.   Neurological: Negative for difficulty with concentration, disturbances in coordination and headaches.   Psychiatric/Behavioral: Negative for altered mental status, depression, hallucinations, memory loss and suicidal ideas.                   Objective:        General: Alexa is well-developed, well-nourished, appears stated age, in no acute distress, alert and oriented to time, place and person.     General    Nursing note and vitals reviewed.  Constitutional: She is oriented to person, place, and time. She appears well-developed and well-nourished.   HENT:   Nose: Nose normal.   Eyes: EOM are normal. Pupils are equal, round, and reactive to light.   Neck: Normal range of motion. Neck supple.   Cardiovascular: Normal rate.    Pulmonary/Chest: Effort normal.   Abdominal: Soft.   Neurological: She is alert and oriented to person, place, and time. She has normal  reflexes.   Psychiatric: She has a normal mood and affect. Her behavior is normal. Judgment and thought content normal.     General Musculoskeletal Exam   Gait: normal       Right Knee Exam     Inspection   Swelling: present  Effusion: present  Bruising: present    Tenderness   The patient is tender to palpation of the patella, patellar tendon, medial joint line and lateral joint line.    Crepitus   The patient has crepitus of the patella.    Range of Motion   Extension: normal   Flexion: 120     Tests   Meniscus   Antoinette:  Medial - positive Lateral - positive  Ligament Examination Lachman: normal (-1 to 2mm) PCL-Posterior Drawer: normal (0 to 2mm)     MCL - Valgus: normal (0 to 2mm)  LCL - Varus: normal  Patella   Patellar apprehension: negative  Passive Patellar Tilt: lateral tilt  Patellar Tracking: abnormal    Other   Popliteal (Baker's) Cyst: present  Sensation: normal    Left Knee Exam     Inspection   Swelling: present  Effusion: absent  Bruising: present    Tenderness   The patient is experiencing no tenderness.     Range of Motion   The patient has normal left knee ROM.    Tests   Meniscus   Antoinette:  Medial - negative Lateral - negative  Stability Lachman: normal (-1 to 2mm) PCL-Posterior Drawer: normal (0 to 2mm)  MCL - Valgus: normal (0 to 2mm)  LCL - Varus: normal (0 to 2mm)  Patella   Patellar apprehension: negative  Passive Patellar Tilt: lateral tilt  Patellar Tracking: abnormal    Other   Popliteal (Baker's) Cyst: present  Sensation: normal    Muscle Strength   Right Lower Extremity   Quadriceps:  5/5   Hamstrin/5   Left Lower Extremity   Quadriceps:  5/5   Hamstrin/5     Vascular Exam     Right Pulses    Posterior Tibial:      2+        Left Pulses    Posterior Tibial:      2+            X-ray images ordered obtained interpreted by me.  They show degenerative changes of both knees with osteophytes present in both femoral condyles and both lower retinaculum of the patellae.  There does  not appear to be any acute effusion or fractures noted.        Assessment:       Encounter Diagnoses   Name Primary?    Acute pain of both knees     Primary osteoarthritis of right knee Yes    Primary osteoarthritis of left knee     Degeneration disease of medial meniscus, right           Plan:       Discussed treatment to the patient including rest, ice and heat, oral NSAIDs both over-the-counter and prescription, injection therapy with steroids or viscosupplementation.  Patient elected for conservative measures with prescription NSAIDs in observation.  Prescribed Mobic 7.5 mg to be taken daily as needed.  Patient will follow up in 4 weeks for reassessment of right knee.                    Patient questionnaires may have been collected.

## 2020-03-13 ENCOUNTER — OFFICE VISIT (OUTPATIENT)
Dept: DERMATOLOGY | Facility: CLINIC | Age: 80
End: 2020-03-13
Payer: MEDICARE

## 2020-03-13 DIAGNOSIS — D22.9 MULTIPLE BENIGN NEVI: ICD-10-CM

## 2020-03-13 DIAGNOSIS — L81.4 SOLAR LENTIGO: ICD-10-CM

## 2020-03-13 DIAGNOSIS — L82.1 SEBORRHEIC KERATOSES: ICD-10-CM

## 2020-03-13 DIAGNOSIS — L82.0 INFLAMED SEBORRHEIC KERATOSIS: Primary | ICD-10-CM

## 2020-03-13 DIAGNOSIS — D18.01 CHERRY ANGIOMA: ICD-10-CM

## 2020-03-13 DIAGNOSIS — L30.4 INTERTRIGO: ICD-10-CM

## 2020-03-13 PROCEDURE — 1101F PT FALLS ASSESS-DOCD LE1/YR: CPT | Mod: S$GLB,,, | Performed by: DERMATOLOGY

## 2020-03-13 PROCEDURE — 99213 PR OFFICE/OUTPT VISIT, EST, LEVL III, 20-29 MIN: ICD-10-PCS | Mod: 25,S$GLB,, | Performed by: DERMATOLOGY

## 2020-03-13 PROCEDURE — 1101F PR PT FALLS ASSESS DOC 0-1 FALLS W/OUT INJ PAST YR: ICD-10-PCS | Mod: S$GLB,,, | Performed by: DERMATOLOGY

## 2020-03-13 PROCEDURE — 17110 DESTRUCTION B9 LES UP TO 14: CPT | Mod: S$GLB,,, | Performed by: DERMATOLOGY

## 2020-03-13 PROCEDURE — 99999 PR PBB SHADOW E&M-EST. PATIENT-LVL II: CPT | Mod: PBBFAC,,, | Performed by: DERMATOLOGY

## 2020-03-13 PROCEDURE — 17110 PR DESTRUCTION BENIGN LESIONS UP TO 14: ICD-10-PCS | Mod: S$GLB,,, | Performed by: DERMATOLOGY

## 2020-03-13 PROCEDURE — 99213 OFFICE O/P EST LOW 20 MIN: CPT | Mod: 25,S$GLB,, | Performed by: DERMATOLOGY

## 2020-03-13 PROCEDURE — 1159F PR MEDICATION LIST DOCUMENTED IN MEDICAL RECORD: ICD-10-PCS | Mod: S$GLB,,, | Performed by: DERMATOLOGY

## 2020-03-13 PROCEDURE — 1159F MED LIST DOCD IN RCRD: CPT | Mod: S$GLB,,, | Performed by: DERMATOLOGY

## 2020-03-13 PROCEDURE — 99999 PR PBB SHADOW E&M-EST. PATIENT-LVL II: ICD-10-PCS | Mod: PBBFAC,,, | Performed by: DERMATOLOGY

## 2020-03-13 RX ORDER — KETOCONAZOLE 20 MG/G
CREAM TOPICAL
Qty: 60 G | Refills: 3 | Status: SHIPPED | OUTPATIENT
Start: 2020-03-13 | End: 2020-05-26

## 2020-03-13 NOTE — PROGRESS NOTES
Subjective:       Patient ID:  Alexa Otero is a 80 y.o. female who presents for   Chief Complaint   Patient presents with    Spot     Last OV : 07- - Ak's treated with Cryo      80 y.o. female who presents for spots on her R temple, L cheek, and L side.   R temple - Has been present for years and has been removed twice - nothing bad   L cheek - Has been present for about 6 months and itches   L Side - Has been present for a few months and itches as well.     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   Skin: Positive for daily sunscreen use and activity-related sunscreen use. Negative for itching, rash, dry skin and wears hat.   Hematologic/Lymphatic: Does not bruise/bleed easily.       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 No distress.   Neurological: She is alert and oriented to person, place, and time. She is not disoriented.   Psychiatric: She has a normal mood and affect.   Skin:   Areas Examined (abnormalities noted in diagram):   Scalp / Hair Palpated and Inspected  Head / Face Inspection Performed  Neck Inspection Performed  Chest / Axilla Inspection Performed  Abdomen Inspection Performed  Back Inspection Performed  RUE Inspected  LUE Inspection Performed  Nails and Digits Inspection Performed                       Diagram Legend     Erythematous scaling macule/papule c/w actinic keratosis       Vascular papule c/w angioma      Pigmented verrucoid papule/plaque c/w seborrheic keratosis       Yellow umbilicated papule c/w sebaceous hyperplasia      Irregularly shaped tan macule c/w lentigo     1-2 mm smooth white papules consistent with Milia      Movable subcutaneous cyst with punctum c/w epidermal inclusion cyst      Subcutaneous movable cyst c/w pilar cyst      Firm pink to brown papule c/w dermatofibroma      Pedunculated fleshy papule(s) c/w skin tag(s)      Evenly pigmented macule c/w junctional nevus     Mildly variegated pigmented, slightly irregular-bordered macule c/w mildly atypical nevus      Flesh colored to evenly pigmented papule c/w intradermal nevus       Pink pearly papule/plaque c/w basal cell carcinoma      Erythematous hyperkeratotic cursted plaque c/w SCC      Surgical scar with no sign of skin cancer recurrence      Open and closed comedones      Inflammatory papules and pustules      Verrucoid papule consistent consistent with wart     Erythematous eczematous patches and plaques     Dystrophic onycholytic nail with subungual debris c/w onychomycosis     Umbilicated papule    Erythematous-base heme-crusted tan verrucoid plaque consistent with inflamed seborrheic keratosis     Erythematous Silvery Scaling Plaque c/w Psoriasis     See annotation      Assessment / Plan:        Inflamed seborrheic keratosis  Cryosurgery procedure note:    Verbal consent from the patient is obtained. Liquid nitrogen cryosurgery is applied to 3 lesions to produce a freeze injury. The patient is aware that blisters may form and is instructed on wound care with gentle cleansing and use of vaseline ointment to keep moist until healed. The patient is supplied a handout on cryosurgery and is instructed to call if lesions do not completely resolve.    Seborrheic keratoses  These are benign inherited growths without a malignant potential. Reassurance given to patient. No treatment is necessary.     Solar lentigo  This is a benign hyperpigmented sun induced lesion. Daily sun protection will reduce the number of  new lesions. Treatment of these benign lesions are considered cosmetic.    Cherry angioma  This is a benign vascular lesion. Reassurance given. No treatment required.     Multiple benign nevi  Discussed ABCDE's of nevi.  Monitor for new mole or moles that are becoming bigger, darker, irritated, or developing irregular borders. Brochure provided.    Intertrigo  Pannus fold  Keto cream BID PRN flare  Cool blow dry after showering. Once clear, use Zeasorb AF powder for maintenance to affected area.    Patient instructed in importance in daily sun protection of at least spf 30. Mineral sunscreen ingredients preferred (Zinc +/- Titanium).   Recommend Elta MD for daily use on face and neck.  Patient encouraged to wear hat for all outdoor exposure.   Also discussed sun avoidance and use of protective clothing.             Follow up in about 1 year (around 3/13/2021), or if symptoms worsen or fail to improve.

## 2020-03-18 DIAGNOSIS — I10 ESSENTIAL HYPERTENSION: ICD-10-CM

## 2020-03-19 RX ORDER — HYDROCHLOROTHIAZIDE 25 MG/1
25 TABLET ORAL DAILY
Qty: 90 TABLET | Refills: 3 | Status: SHIPPED | OUTPATIENT
Start: 2020-03-19 | End: 2021-03-08

## 2020-03-19 RX ORDER — SIMVASTATIN 40 MG/1
40 TABLET, FILM COATED ORAL NIGHTLY
Qty: 90 TABLET | Refills: 3 | Status: SHIPPED | OUTPATIENT
Start: 2020-03-19 | End: 2021-03-08

## 2020-03-19 RX ORDER — BENAZEPRIL HYDROCHLORIDE 40 MG/1
40 TABLET ORAL DAILY
Qty: 90 TABLET | Refills: 3 | Status: SHIPPED | OUTPATIENT
Start: 2020-03-19 | End: 2021-03-08

## 2020-03-19 RX ORDER — METFORMIN HYDROCHLORIDE 500 MG/1
500 TABLET ORAL 2 TIMES DAILY WITH MEALS
Qty: 180 TABLET | Refills: 3 | Status: SHIPPED | OUTPATIENT
Start: 2020-03-19 | End: 2020-11-19 | Stop reason: SDUPTHER

## 2020-03-19 RX ORDER — OMEPRAZOLE 40 MG/1
40 CAPSULE, DELAYED RELEASE ORAL DAILY
Qty: 90 CAPSULE | Refills: 3 | Status: SHIPPED | OUTPATIENT
Start: 2020-03-19 | End: 2021-03-08

## 2020-04-15 ENCOUNTER — PATIENT MESSAGE (OUTPATIENT)
Dept: FAMILY MEDICINE | Facility: CLINIC | Age: 80
End: 2020-04-15

## 2020-04-15 ENCOUNTER — OFFICE VISIT (OUTPATIENT)
Dept: FAMILY MEDICINE | Facility: CLINIC | Age: 80
End: 2020-04-15
Payer: MEDICARE

## 2020-04-15 VITALS
HEIGHT: 63 IN | HEART RATE: 92 BPM | RESPIRATION RATE: 18 BRPM | BODY MASS INDEX: 26.21 KG/M2 | WEIGHT: 147.94 LBS | DIASTOLIC BLOOD PRESSURE: 64 MMHG | TEMPERATURE: 98 F | SYSTOLIC BLOOD PRESSURE: 122 MMHG | OXYGEN SATURATION: 95 %

## 2020-04-15 DIAGNOSIS — R30.0 DYSURIA: ICD-10-CM

## 2020-04-15 DIAGNOSIS — I15.2 HYPERTENSION ASSOCIATED WITH DIABETES: ICD-10-CM

## 2020-04-15 DIAGNOSIS — K21.9 GASTROESOPHAGEAL REFLUX DISEASE, ESOPHAGITIS PRESENCE NOT SPECIFIED: ICD-10-CM

## 2020-04-15 DIAGNOSIS — E66.3 OVERWEIGHT (BMI 25.0-29.9): ICD-10-CM

## 2020-04-15 DIAGNOSIS — E11.69 HYPERLIPIDEMIA ASSOCIATED WITH TYPE 2 DIABETES MELLITUS: ICD-10-CM

## 2020-04-15 DIAGNOSIS — N30.00 ACUTE CYSTITIS WITHOUT HEMATURIA: Primary | ICD-10-CM

## 2020-04-15 DIAGNOSIS — E78.5 HYPERLIPIDEMIA ASSOCIATED WITH TYPE 2 DIABETES MELLITUS: ICD-10-CM

## 2020-04-15 DIAGNOSIS — E11.59 HYPERTENSION ASSOCIATED WITH DIABETES: ICD-10-CM

## 2020-04-15 DIAGNOSIS — E11.9 CONTROLLED TYPE 2 DIABETES MELLITUS WITHOUT COMPLICATION, WITHOUT LONG-TERM CURRENT USE OF INSULIN: ICD-10-CM

## 2020-04-15 LAB
BILIRUB SERPL-MCNC: NEGATIVE MG/DL
BLOOD URINE, POC: NEGATIVE
COLOR, POC UA: YELLOW
GLUCOSE UR QL STRIP: NORMAL
KETONES UR QL STRIP: NEGATIVE
LEUKOCYTE ESTERASE URINE, POC: ABNORMAL
NITRITE, POC UA: NEGATIVE
PH, POC UA: 7
PROTEIN, POC: ABNORMAL
SPECIFIC GRAVITY, POC UA: 1
UROBILINOGEN, POC UA: 1

## 2020-04-15 PROCEDURE — 3074F PR MOST RECENT SYSTOLIC BLOOD PRESSURE < 130 MM HG: ICD-10-PCS | Mod: S$GLB,,, | Performed by: NURSE PRACTITIONER

## 2020-04-15 PROCEDURE — 87086 URINE CULTURE/COLONY COUNT: CPT

## 2020-04-15 PROCEDURE — 1126F PR PAIN SEVERITY QUANTIFIED, NO PAIN PRESENT: ICD-10-PCS | Mod: S$GLB,,, | Performed by: NURSE PRACTITIONER

## 2020-04-15 PROCEDURE — 99214 PR OFFICE/OUTPT VISIT, EST, LEVL IV, 30-39 MIN: ICD-10-PCS | Mod: 25,S$GLB,, | Performed by: NURSE PRACTITIONER

## 2020-04-15 PROCEDURE — 99214 OFFICE O/P EST MOD 30 MIN: CPT | Mod: 25,S$GLB,, | Performed by: NURSE PRACTITIONER

## 2020-04-15 PROCEDURE — 81002 POCT URINE DIPSTICK WITHOUT MICROSCOPE: ICD-10-PCS | Mod: S$GLB,,, | Performed by: NURSE PRACTITIONER

## 2020-04-15 PROCEDURE — 1159F MED LIST DOCD IN RCRD: CPT | Mod: S$GLB,,, | Performed by: NURSE PRACTITIONER

## 2020-04-15 PROCEDURE — 1159F PR MEDICATION LIST DOCUMENTED IN MEDICAL RECORD: ICD-10-PCS | Mod: S$GLB,,, | Performed by: NURSE PRACTITIONER

## 2020-04-15 PROCEDURE — 1126F AMNT PAIN NOTED NONE PRSNT: CPT | Mod: S$GLB,,, | Performed by: NURSE PRACTITIONER

## 2020-04-15 PROCEDURE — 99999 PR PBB SHADOW E&M-EST. PATIENT-LVL IV: CPT | Mod: PBBFAC,,, | Performed by: NURSE PRACTITIONER

## 2020-04-15 PROCEDURE — 3078F PR MOST RECENT DIASTOLIC BLOOD PRESSURE < 80 MM HG: ICD-10-PCS | Mod: S$GLB,,, | Performed by: NURSE PRACTITIONER

## 2020-04-15 PROCEDURE — 81002 URINALYSIS NONAUTO W/O SCOPE: CPT | Mod: S$GLB,,, | Performed by: NURSE PRACTITIONER

## 2020-04-15 PROCEDURE — 1101F PR PT FALLS ASSESS DOC 0-1 FALLS W/OUT INJ PAST YR: ICD-10-PCS | Mod: S$GLB,,, | Performed by: NURSE PRACTITIONER

## 2020-04-15 PROCEDURE — 3074F SYST BP LT 130 MM HG: CPT | Mod: S$GLB,,, | Performed by: NURSE PRACTITIONER

## 2020-04-15 PROCEDURE — 99999 PR PBB SHADOW E&M-EST. PATIENT-LVL IV: ICD-10-PCS | Mod: PBBFAC,,, | Performed by: NURSE PRACTITIONER

## 2020-04-15 PROCEDURE — 3078F DIAST BP <80 MM HG: CPT | Mod: S$GLB,,, | Performed by: NURSE PRACTITIONER

## 2020-04-15 PROCEDURE — 1101F PT FALLS ASSESS-DOCD LE1/YR: CPT | Mod: S$GLB,,, | Performed by: NURSE PRACTITIONER

## 2020-04-15 RX ORDER — SULFAMETHOXAZOLE AND TRIMETHOPRIM 800; 160 MG/1; MG/1
1 TABLET ORAL 2 TIMES DAILY
Qty: 14 TABLET | Refills: 0 | Status: SHIPPED | OUTPATIENT
Start: 2020-04-15 | End: 2020-04-22

## 2020-04-15 NOTE — PROGRESS NOTES
Subjective:       Patient ID: Alexa Otero is a 80 y.o. female.    Chief Complaint: No chief complaint on file.    Dysuria    This is a new problem. The current episode started 1 to 4 weeks ago. The problem occurs every urination. The problem has been waxing and waning. The quality of the pain is described as aching. The pain is at a severity of 4/10. The pain is moderate. There has been no fever. She is not sexually active. There is no history of pyelonephritis. Associated symptoms include hesitancy. Pertinent negatives include no behavior changes, chills, discharge, flank pain, frequency, hematuria, nausea, possible pregnancy, sweats, urgency, vomiting, weight loss, constipation, rash or withholding. Associated symptoms comments: Suprapubic pressure. She has tried increased fluids for the symptoms. The treatment provided mild relief. Her past medical history is significant for catheterization, diabetes mellitus and hypertension. There is no history of diabetes insipidus, genitourinary reflux, kidney stones, recurrent UTIs, a single kidney, STD, urinary stasis or a urological procedure.       Past Medical History:   Diagnosis Date    Arthritis     Cataract     Diabetes mellitus type II     Encounter for blood transfusion     GERD (gastroesophageal reflux disease)     Hyperlipidemia     Hypertension     Macular degeneration     Squamous cell carcinoma 1980's    groin area       Review of patient's allergies indicates:  No Known Allergies      Current Outpatient Medications:     benazepriL (LOTENSIN) 40 MG tablet, Take 1 tablet (40 mg total) by mouth once daily., Disp: 90 tablet, Rfl: 3    famotidine (PEPCID) 40 MG tablet, Take 1 tablet (40 mg total) by mouth nightly as needed for Heartburn., Disp: 90 tablet, Rfl: 3    hydroCHLOROthiazide (HYDRODIURIL) 25 MG tablet, Take 1 tablet (25 mg total) by mouth once daily., Disp: 90 tablet, Rfl: 3    ketoconazole (NIZORAL) 2 % cream, AAA pannus fold bid PRN  "flare, Disp: 60 g, Rfl: 3    meloxicam (MOBIC) 7.5 MG tablet, TAKE 1 TABLET(7.5 MG) BY MOUTH EVERY DAY, Disp: 90 tablet, Rfl: 1    metFORMIN (GLUCOPHAGE) 500 MG tablet, Take 1 tablet (500 mg total) by mouth 2 (two) times daily with meals., Disp: 180 tablet, Rfl: 3    omeprazole (PRILOSEC) 40 MG capsule, Take 1 capsule (40 mg total) by mouth once daily., Disp: 90 capsule, Rfl: 3    simvastatin (ZOCOR) 40 MG tablet, Take 1 tablet (40 mg total) by mouth every evening., Disp: 90 tablet, Rfl: 3    sulfamethoxazole-trimethoprim 800-160mg (BACTRIM DS) 800-160 mg Tab, Take 1 tablet by mouth 2 (two) times daily. for 7 days, Disp: 14 tablet, Rfl: 0    VIT A/VIT C/VIT E/ZINC/COPPER (ICAPS AREDS ORAL), Take by mouth., Disp: , Rfl:     Current Facility-Administered Medications:     dexamethasone injection 8 mg, 8 mg, Intramuscular, 1 time in Clinic/HOD, Sudha Briceno NP    Review of Systems   Constitutional: Negative for chills, unexpected weight change and weight loss.   HENT: Negative for trouble swallowing.    Eyes: Negative for visual disturbance.   Respiratory: Negative for shortness of breath.    Cardiovascular: Negative for chest pain, palpitations and leg swelling.   Gastrointestinal: Negative for blood in stool, constipation, nausea and vomiting.   Genitourinary: Positive for dysuria and hesitancy. Negative for flank pain, frequency, hematuria and urgency.   Skin: Negative for rash.   Allergic/Immunologic: Negative for immunocompromised state.   Neurological: Negative for headaches.   Hematological: Does not bruise/bleed easily.   Psychiatric/Behavioral: Negative for agitation. The patient is not nervous/anxious.        Objective:      /64 (BP Location: Left arm, Patient Position: Sitting, BP Method: Medium (Manual))   Pulse 92   Temp 98.2 °F (36.8 °C) (Oral)   Resp 18   Ht 5' 3" (1.6 m)   Wt 67.1 kg (147 lb 14.9 oz)   SpO2 95%   BMI 26.20 kg/m²   Physical Exam   Constitutional: She is " oriented to person, place, and time. She appears well-developed and well-nourished.   Eyes: Pupils are equal, round, and reactive to light. EOM are normal.   Neck: Normal range of motion.   Cardiovascular: Normal rate, regular rhythm and normal heart sounds.   Pulmonary/Chest: Effort normal and breath sounds normal.   Abdominal: Soft. Bowel sounds are normal. There is tenderness in the suprapubic area. There is no CVA tenderness.   Musculoskeletal: Normal range of motion.   Neurological: She is alert and oriented to person, place, and time.   Skin: Skin is warm and dry.   Psychiatric: She has a normal mood and affect. Her behavior is normal. Judgment and thought content normal.       Assessment:       1. Acute cystitis without hematuria    2. Dysuria    3. Controlled type 2 diabetes mellitus without complication, without long-term current use of insulin    4. Hyperlipidemia associated with type 2 diabetes mellitus    5. Hypertension associated with diabetes    6. Gastroesophageal reflux disease, esophagitis presence not specified    7. Overweight (BMI 25.0-29.9)        Plan:       Acute cystitis without hematuria  -     sulfamethoxazole-trimethoprim 800-160mg (BACTRIM DS) 800-160 mg Tab; Take 1 tablet by mouth 2 (two) times daily. for 7 days  Dispense: 14 tablet; Refill: 0    Dysuria  -     POCT URINE DIPSTICK WITHOUT MICROSCOPE  -     Urine culture  -     sulfamethoxazole-trimethoprim 800-160mg (BACTRIM DS) 800-160 mg Tab; Take 1 tablet by mouth 2 (two) times daily. for 7 days  Dispense: 14 tablet; Refill: 0    Controlled type 2 diabetes mellitus without complication, without long-term current use of insulin  Stable, continue medication  Follow the ADA diet  Hemoglobin A1C   Date Value Ref Range Status   11/25/2019 6.1 (H) 4.0 - 5.6 % Final     Comment:     ADA Screening Guidelines:  5.7-6.4%  Consistent with prediabetes  >or=6.5%  Consistent with diabetes  High levels of fetal hemoglobin interfere with the  "HbA1C  assay. Heterozygous hemoglobin variants (HbS, HgC, etc)do  not significantly interfere with this assay.   However, presence of multiple variants may affect accuracy.     04/24/2019 6.9 (H) 4.0 - 5.6 % Final     Comment:     ADA Screening Guidelines:  5.7-6.4%  Consistent with prediabetes  >or=6.5%  Consistent with diabetes  High levels of fetal hemoglobin interfere with the HbA1C  assay. Heterozygous hemoglobin variants (HbS, HgC, etc)do  not significantly interfere with this assay.   However, presence of multiple variants may affect accuracy.     10/05/2018 6.0 (H) 4.0 - 5.6 % Final     Comment:     ADA Screening Guidelines:  5.7-6.4%  Consistent with prediabetes  >or=6.5%  Consistent with diabetes  High levels of fetal hemoglobin interfere with the HbA1C  assay. Heterozygous hemoglobin variants (HbS, HgC, etc)do  not significantly interfere with this assay.   However, presence of multiple variants may affect accuracy.     Hyperlipidemia associated with type 2 diabetes mellitus  On Zocor  Hypertension associated with diabetes  Stable, continue medication  Follow a low sodium diet  BP Readings from Last 3 Encounters:   04/15/20 122/64   03/10/20 122/60   12/11/19 (!) 149/83     Gastroesophageal reflux disease, esophagitis presence not specified  Stable, continue medicaiton  Overweight (BMI 25.0-29.9)  Counseled patient on his ideal body weight, health consequences of being obese and current recommendations including weekly exercise and a heart healthy diet.  Current BMI is:Estimated body mass index is 26.2 kg/m² as calculated from the following:    Height as of this encounter: 5' 3" (1.6 m).    Weight as of this encounter: 67.1 kg (147 lb 14.9 oz)..  Patient is aware that ideal BMI < 25 or Weight in (lb) to have BMI = 25: 140.8.            Patient readiness: acceptance and barriers:none    During the course of the visit the patient was educated and counseled about the following:     Diabetes:  Discussed " general issues about diabetes pathophysiology and management.  Addressed ADA diet.  Encouraged aerobic exercise.  Hypertension:   Dietary sodium restriction.  Regular aerobic exercise.    Goals: Diabetes: Maintain Hemoglobin A1C below 7 and Hypertension: Reduce Blood Pressure    Did patient meet goals/outcomes: No    The following self management tools provided: declined    Patient Instructions (the written plan) was given to the patient/family.     Time spent with patient: 15 minutes    Barriers to medications present (no )    Adverse reactions to current medications (no)    Over the counter medications reviewed (Yes)

## 2020-04-15 NOTE — PATIENT INSTRUCTIONS
"  Bladder Infection, Female (Adult)    Urine is normally doesn't have any bacteria in it. But bacteria can get into the urinary tract from the skin around the rectum. Or they can travel in the blood from elsewhere in the body. Once they are in your urinary tract, they can cause infection in the urethra (urethritis), the bladder (cystitis), or the kidneys (pyelonephritis).  The most common place for an infection is in the bladder. This is called a bladder infection. This is one of the most common infections in women. Most bladder infections are easily treated. They are not serious unless the infection spreads to the kidney.  The phrases "bladder infection," "UTI," and "cystitis" are often used to describe the same thing. But they are not always the same. Cystitis is an inflammation of the bladder. The most common cause of cystitis is an infection.  Symptoms  The infection causes inflammation in the urethra and bladder. This causes many of the symptoms. The most common symptoms of a bladder infection are:  · Pain or burning when urinating  · Having to urinate more often than usual  · Urgent need to urinate  · Only a small amount of urine comes out  · Blood in urine  · Abdominal discomfort. This is usually in the lower abdomen above the pubic bone.  · Cloudy urine  · Strong- or bad-smelling urine  · Unable to urinate (urinary retention)  · Unable to hold urine in (urinary incontinence)  · Fever  · Loss of appetite  · Confusion (in older adults)  Causes  Bladder infections are not contagious. You can't get one from someone else, from a toilet seat, or from sharing a bath.  The most common cause of bladder infections is bacteria from the bowels. The bacteria get onto the skin around the opening of the urethra. From there, they can get into the urine and travel up to the bladder, causing inflammation and infection. This usually happens because of:  · Wiping improperly after urinating. Always wipe from front to " back.  · Bowel incontinence  · Pregnancy  · Procedures such as having a catheter inserted  · Older age  · Not emptying your bladder. This can allow bacteria a chance to grow in your urine.  · Dehydration  · Constipation  · Sex  · Use of a diaphragm for birth control   Treatment  Bladder infections are diagnosed by a urine test. They are treated with antibiotics and usually clear up quickly without complications. Treatment helps prevent a more serious kidney infection.  Medicines  Medicines can help in the treatment of a bladder infection:  · Take antibiotics until they are used up, even if you feel better. It is important to finish them to make sure the infection has cleared.  · You can use acetaminophen or ibuprofen for pain, fever, or discomfort, unless another medicine was prescribed. If you have chronic liver or kidney disease, talk with your healthcare provider before using these medicines. Also talk with your provider if you've ever had a stomach ulcer or gastrointestinal bleeding, or are taking blood-thinner medicines.  · If you are given phenazopydridine to reduce burning with urination, it will cause your urine to become a bright orange color. This can stain clothing.  Care and prevention  These self-care steps can help prevent future infections:  · Drink plenty of fluids to prevent dehydration and flush out your bladder. Do this unless you must restrict fluids for other health reasons, or your doctor told you not to.  · Proper cleaning after going to the bathroom is important. Wipe from front to back after using the toilet to prevent the spread of bacteria.  · Urinate more often. Don't try to hold urine in for a long time.  · Wear loose-fitting clothes and cotton underwear. Avoid tight-fitting pants.  · Improve your diet and prevent constipation. Eat more fresh fruit and vegetables, and fiber, and less junk and fatty foods.  · Avoid sex until your symptoms are gone.  · Avoid caffeine, alcohol, and spicy  foods. These can irritate your bladder.  · Urinate right after intercourse to flush out your bladder.  · If you use birth control pills and have frequent bladder infections, discuss it with your doctor.  Follow-up care  Call your healthcare provider if all symptoms are not gone after 3 days of treatment. This is especially important if you have repeat infections.  If a culture was done, you will be told if your treatment needs to be changed. If directed, you can call to find out the results.  If X-rays were done, you will be told if the results will affect your treatment.  Call 911  Call 911 if any of the following occur:  · Trouble breathing  · Hard to wake up or confusion  · Fainting or loss of consciousness  · Rapid heart rate  When to seek medical advice  Call your healthcare provider right away if any of these occur:  · Fever of 100.4ºF (38.0ºC) or higher, or as directed by your healthcare provider  · Symptoms are not better by the third day of treatment  · Back or belly (abdominal) pain that gets worse  · Repeated vomiting, or unable to keep medicine down  · Weakness or dizziness  · Vaginal discharge  · Pain, redness, or swelling in the outer vaginal area (labia)  Date Last Reviewed: 10/1/2016  © 2903-1336 [employer]. [address] [place] [address], [place] [address]. All rights reserved. This information is not intended as a substitute for professional medical care. Always follow your healthcare professional's instructions.        Urine Culture  Does this test have other names?  Urine culture and sensitivity, urine C&S  What is this test?  This test checks for bacteria in your urine that could be causing an infection in your urinary tract. The urinary tract includes the kidneys, bladder, ureters, and urethra.  The results of a urine culture help your doctor find out what's causing your infection and determine the best way to treat it. Almost 90% of urinary tract infections (UTIs) are caused by E. coli  "bacteria. Other types of bacteria, tuberculosis, and yeast infections can also cause a urinary tract infection.  Why do I need this test?  You may need this test if you have symptoms of a UTI. These include:  · Fever and chills  · Burning pain when urinating  · Pain in the back or lower belly  · Frequent need to urinate  · Cloudy or smelly urine  What other tests might I have along with this test?  Your doctor may also order a urinalysis, which is a urine test to check for white blood cells. He or she may also order a blood test to look for signs of infection in your blood.  What do my test results mean?  Many things may affect your lab test results. These include the method each lab uses to do the test. Even if your test results are different from the normal value, you may not have a problem. To learn what the results mean for you, talk with your healthcare provider.  Urine culture results are given in colony forming units per milliliter (CFU/mL). A negative result means you don't have an infection. A higher bacterial count may mean infection.  How is this test done?  This test requires a "clean-catch" urine sample. To collect this type of sample:  · Carefully wash and dry your hands before removing the cap of the specimen container.  · Clean the area around the opening of your urethra with an antiseptic pad.  · Start urinating directly into the toilet, then urinate into the sterile container to collect a sample.  · Fill the container as instructed.  · Do not let any part of the container touch your genitals or skin.  · Recap the container.  Does this test pose any risks?  This test poses no known risks.  What might affect my test results?  Taking antibiotics right before the test may affect your results.  How do I get ready for this test?  Drink enough water before the test so that you can urinate. Tell your doctor if you have taken antibiotic medicine recently. Be sure your doctor knows about all medicines, " herbs, vitamins, and supplements you are taking. This includes medicines that don't need a prescription and any illicit drugs you may use.   © 3017-1062 [employer], [employer]. [address] [place] [address], [place] [address]. All rights reserved. This information is not intended as a substitute for professional medical care. Always follow your healthcare professional's instructions.

## 2020-04-16 LAB — BACTERIA UR CULT: NO GROWTH

## 2020-05-05 ENCOUNTER — PATIENT MESSAGE (OUTPATIENT)
Dept: ADMINISTRATIVE | Facility: HOSPITAL | Age: 80
End: 2020-05-05

## 2020-05-19 ENCOUNTER — LAB VISIT (OUTPATIENT)
Dept: LAB | Facility: HOSPITAL | Age: 80
End: 2020-05-19
Attending: FAMILY MEDICINE
Payer: MEDICARE

## 2020-05-19 DIAGNOSIS — E78.5 HYPERLIPIDEMIA, UNSPECIFIED HYPERLIPIDEMIA TYPE: ICD-10-CM

## 2020-05-19 DIAGNOSIS — E55.9 VITAMIN D DEFICIENCY DISEASE: ICD-10-CM

## 2020-05-19 DIAGNOSIS — E11.9 CONTROLLED TYPE 2 DIABETES MELLITUS WITHOUT COMPLICATION, WITHOUT LONG-TERM CURRENT USE OF INSULIN: ICD-10-CM

## 2020-05-19 LAB
25(OH)D3+25(OH)D2 SERPL-MCNC: 27 NG/ML (ref 30–96)
ALBUMIN SERPL BCP-MCNC: 4 G/DL (ref 3.5–5.2)
ALP SERPL-CCNC: 45 U/L (ref 55–135)
ALT SERPL W/O P-5'-P-CCNC: 15 U/L (ref 10–44)
ANION GAP SERPL CALC-SCNC: 12 MMOL/L (ref 8–16)
AST SERPL-CCNC: 19 U/L (ref 10–40)
BASOPHILS # BLD AUTO: 0.06 K/UL (ref 0–0.2)
BASOPHILS NFR BLD: 0.8 % (ref 0–1.9)
BILIRUB SERPL-MCNC: 0.5 MG/DL (ref 0.1–1)
BUN SERPL-MCNC: 27 MG/DL (ref 8–23)
CALCIUM SERPL-MCNC: 9.8 MG/DL (ref 8.7–10.5)
CHLORIDE SERPL-SCNC: 102 MMOL/L (ref 95–110)
CHOLEST SERPL-MCNC: 180 MG/DL (ref 120–199)
CHOLEST/HDLC SERPL: 3.6 {RATIO} (ref 2–5)
CO2 SERPL-SCNC: 27 MMOL/L (ref 23–29)
CREAT SERPL-MCNC: 1.1 MG/DL (ref 0.5–1.4)
DIFFERENTIAL METHOD: ABNORMAL
EOSINOPHIL # BLD AUTO: 0.3 K/UL (ref 0–0.5)
EOSINOPHIL NFR BLD: 4 % (ref 0–8)
ERYTHROCYTE [DISTWIDTH] IN BLOOD BY AUTOMATED COUNT: 14.3 % (ref 11.5–14.5)
EST. GFR  (AFRICAN AMERICAN): 55.2 ML/MIN/1.73 M^2
EST. GFR  (NON AFRICAN AMERICAN): 47.9 ML/MIN/1.73 M^2
GLUCOSE SERPL-MCNC: 93 MG/DL (ref 70–110)
HCT VFR BLD AUTO: 42.6 % (ref 37–48.5)
HDLC SERPL-MCNC: 50 MG/DL (ref 40–75)
HDLC SERPL: 27.8 % (ref 20–50)
HGB BLD-MCNC: 13 G/DL (ref 12–16)
IMM GRANULOCYTES # BLD AUTO: 0.01 K/UL (ref 0–0.04)
IMM GRANULOCYTES NFR BLD AUTO: 0.1 % (ref 0–0.5)
LDLC SERPL CALC-MCNC: 117.4 MG/DL (ref 63–159)
LYMPHOCYTES # BLD AUTO: 2.4 K/UL (ref 1–4.8)
LYMPHOCYTES NFR BLD: 34.3 % (ref 18–48)
MCH RBC QN AUTO: 28.3 PG (ref 27–31)
MCHC RBC AUTO-ENTMCNC: 30.5 G/DL (ref 32–36)
MCV RBC AUTO: 93 FL (ref 82–98)
MONOCYTES # BLD AUTO: 0.6 K/UL (ref 0.3–1)
MONOCYTES NFR BLD: 7.8 % (ref 4–15)
NEUTROPHILS # BLD AUTO: 3.7 K/UL (ref 1.8–7.7)
NEUTROPHILS NFR BLD: 53 % (ref 38–73)
NONHDLC SERPL-MCNC: 130 MG/DL
NRBC BLD-RTO: 0 /100 WBC
PLATELET # BLD AUTO: 270 K/UL (ref 150–350)
PMV BLD AUTO: 11 FL (ref 9.2–12.9)
POTASSIUM SERPL-SCNC: 3.7 MMOL/L (ref 3.5–5.1)
PROT SERPL-MCNC: 7.2 G/DL (ref 6–8.4)
RBC # BLD AUTO: 4.6 M/UL (ref 4–5.4)
SODIUM SERPL-SCNC: 141 MMOL/L (ref 136–145)
TRIGL SERPL-MCNC: 63 MG/DL (ref 30–150)
WBC # BLD AUTO: 7.06 K/UL (ref 3.9–12.7)

## 2020-05-19 PROCEDURE — 36415 COLL VENOUS BLD VENIPUNCTURE: CPT | Mod: PO

## 2020-05-19 PROCEDURE — 85025 COMPLETE CBC W/AUTO DIFF WBC: CPT

## 2020-05-19 PROCEDURE — 83036 HEMOGLOBIN GLYCOSYLATED A1C: CPT

## 2020-05-19 PROCEDURE — 80061 LIPID PANEL: CPT

## 2020-05-19 PROCEDURE — 82306 VITAMIN D 25 HYDROXY: CPT

## 2020-05-19 PROCEDURE — 80053 COMPREHEN METABOLIC PANEL: CPT

## 2020-05-20 LAB
ESTIMATED AVG GLUCOSE: 128 MG/DL (ref 68–131)
HBA1C MFR BLD HPLC: 6.1 % (ref 4–5.6)

## 2020-05-24 ENCOUNTER — PATIENT MESSAGE (OUTPATIENT)
Dept: FAMILY MEDICINE | Facility: CLINIC | Age: 80
End: 2020-05-24

## 2020-05-26 ENCOUNTER — OFFICE VISIT (OUTPATIENT)
Dept: FAMILY MEDICINE | Facility: CLINIC | Age: 80
End: 2020-05-26
Payer: MEDICARE

## 2020-05-26 ENCOUNTER — HOSPITAL ENCOUNTER (OUTPATIENT)
Dept: RADIOLOGY | Facility: HOSPITAL | Age: 80
Discharge: HOME | End: 2020-05-26
Attending: FAMILY MEDICINE
Payer: MEDICARE

## 2020-05-26 VITALS
SYSTOLIC BLOOD PRESSURE: 122 MMHG | HEART RATE: 85 BPM | HEIGHT: 63 IN | OXYGEN SATURATION: 97 % | WEIGHT: 147.25 LBS | BODY MASS INDEX: 26.09 KG/M2 | RESPIRATION RATE: 12 BRPM | TEMPERATURE: 97 F | DIASTOLIC BLOOD PRESSURE: 60 MMHG

## 2020-05-26 DIAGNOSIS — E55.9 VITAMIN D DEFICIENCY DISEASE: ICD-10-CM

## 2020-05-26 DIAGNOSIS — E11.9 CONTROLLED TYPE 2 DIABETES MELLITUS WITHOUT COMPLICATION, WITHOUT LONG-TERM CURRENT USE OF INSULIN: ICD-10-CM

## 2020-05-26 DIAGNOSIS — I10 ESSENTIAL HYPERTENSION: Primary | ICD-10-CM

## 2020-05-26 DIAGNOSIS — R10.84 GENERALIZED ABDOMINAL PAIN: ICD-10-CM

## 2020-05-26 DIAGNOSIS — R94.4 DECREASED GFR: ICD-10-CM

## 2020-05-26 DIAGNOSIS — K21.9 GASTROESOPHAGEAL REFLUX DISEASE, ESOPHAGITIS PRESENCE NOT SPECIFIED: ICD-10-CM

## 2020-05-26 DIAGNOSIS — E78.5 HYPERLIPIDEMIA, UNSPECIFIED HYPERLIPIDEMIA TYPE: ICD-10-CM

## 2020-05-26 DIAGNOSIS — D75.1 POLYCYTHEMIA, SECONDARY: ICD-10-CM

## 2020-05-26 PROCEDURE — 99214 PR OFFICE/OUTPT VISIT, EST, LEVL IV, 30-39 MIN: ICD-10-PCS | Mod: S$GLB,,, | Performed by: FAMILY MEDICINE

## 2020-05-26 PROCEDURE — 74178 CT ABD&PLV WO CNTR FLWD CNTR: CPT | Mod: 26,,, | Performed by: RADIOLOGY

## 2020-05-26 PROCEDURE — 3074F SYST BP LT 130 MM HG: CPT | Mod: S$GLB,,, | Performed by: FAMILY MEDICINE

## 2020-05-26 PROCEDURE — 1126F PR PAIN SEVERITY QUANTIFIED, NO PAIN PRESENT: ICD-10-PCS | Mod: S$GLB,,, | Performed by: FAMILY MEDICINE

## 2020-05-26 PROCEDURE — 3288F FALL RISK ASSESSMENT DOCD: CPT | Mod: S$GLB,,, | Performed by: FAMILY MEDICINE

## 2020-05-26 PROCEDURE — 3074F PR MOST RECENT SYSTOLIC BLOOD PRESSURE < 130 MM HG: ICD-10-PCS | Mod: S$GLB,,, | Performed by: FAMILY MEDICINE

## 2020-05-26 PROCEDURE — 1159F MED LIST DOCD IN RCRD: CPT | Mod: S$GLB,,, | Performed by: FAMILY MEDICINE

## 2020-05-26 PROCEDURE — 3078F PR MOST RECENT DIASTOLIC BLOOD PRESSURE < 80 MM HG: ICD-10-PCS | Mod: S$GLB,,, | Performed by: FAMILY MEDICINE

## 2020-05-26 PROCEDURE — 99999 PR PBB SHADOW E&M-EST. PATIENT-LVL IV: CPT | Mod: PBBFAC,,, | Performed by: FAMILY MEDICINE

## 2020-05-26 PROCEDURE — 74178 CT ABD&PLV WO CNTR FLWD CNTR: CPT | Mod: TC

## 2020-05-26 PROCEDURE — 3288F PR FALLS RISK ASSESSMENT DOCUMENTED: ICD-10-PCS | Mod: S$GLB,,, | Performed by: FAMILY MEDICINE

## 2020-05-26 PROCEDURE — 99214 OFFICE O/P EST MOD 30 MIN: CPT | Mod: S$GLB,,, | Performed by: FAMILY MEDICINE

## 2020-05-26 PROCEDURE — 74178 CT ABDOMEN PELVIS W WO CONTRAST: ICD-10-PCS | Mod: 26,,, | Performed by: RADIOLOGY

## 2020-05-26 PROCEDURE — 1100F PR PT FALLS ASSESS DOC 2+ FALLS/FALL W/INJURY/YR: ICD-10-PCS | Mod: S$GLB,,, | Performed by: FAMILY MEDICINE

## 2020-05-26 PROCEDURE — 99999 PR PBB SHADOW E&M-EST. PATIENT-LVL IV: ICD-10-PCS | Mod: PBBFAC,,, | Performed by: FAMILY MEDICINE

## 2020-05-26 PROCEDURE — 1100F PTFALLS ASSESS-DOCD GE2>/YR: CPT | Mod: S$GLB,,, | Performed by: FAMILY MEDICINE

## 2020-05-26 PROCEDURE — 1159F PR MEDICATION LIST DOCUMENTED IN MEDICAL RECORD: ICD-10-PCS | Mod: S$GLB,,, | Performed by: FAMILY MEDICINE

## 2020-05-26 PROCEDURE — 1126F AMNT PAIN NOTED NONE PRSNT: CPT | Mod: S$GLB,,, | Performed by: FAMILY MEDICINE

## 2020-05-26 PROCEDURE — 3078F DIAST BP <80 MM HG: CPT | Mod: S$GLB,,, | Performed by: FAMILY MEDICINE

## 2020-05-26 PROCEDURE — 25500020 PHARM REV CODE 255

## 2020-05-26 RX ORDER — ERGOCALCIFEROL 1.25 MG/1
50000 CAPSULE ORAL
Qty: 12 CAPSULE | Refills: 1 | Status: SHIPPED | OUTPATIENT
Start: 2020-05-26 | End: 2020-09-18 | Stop reason: CLARIF

## 2020-05-26 RX ADMIN — IOHEXOL 1000 ML: 9 SOLUTION ORAL at 06:05

## 2020-05-26 RX ADMIN — IOHEXOL 75 ML: 350 INJECTION, SOLUTION INTRAVENOUS at 06:05

## 2020-05-26 NOTE — PROGRESS NOTES
Subjective:       Patient ID: Alexa Otero is a 80 y.o. female.    Chief Complaint: Follow-up (6MTH F/U HYPERTENSION)    HPI  Review of Systems   Constitutional: Negative for fatigue and fever.   Cardiovascular: Negative for chest pain.   Gastrointestinal: Positive for abdominal pain. Negative for blood in stool, constipation, diarrhea, nausea and vomiting.   Endocrine: Negative for polydipsia, polyphagia and polyuria.   Skin: Negative for pallor.   Neurological: Negative for dizziness, tremors, seizures, speech difficulty, weakness and headaches.   Psychiatric/Behavioral: Negative for confusion. The patient is not nervous/anxious.        Patient Active Problem List   Diagnosis    Sciatica    Syncope and collapse    Polycythemia, secondary    Hyperlipemia    Diabetes mellitus type 2, controlled    HTN (hypertension)    Acute chest pain    Vitamin D deficiency disease    Gastroesophageal reflux disease    History of Kwon's esophagus    Pancreatic pseudocyst/cyst    Hepatic cyst    Low back pain radiating to both legs    Primary osteoarthritis of right ankle    Kwon's esophagus    Low back pain    Dupuytren's contracture of both hands     Patient is here for a chronic conditions follow up.    Reviewed labs 5/20    Has pressure when needs to urinate relieved with urination since 3/20. U/a and c/s unremarkable 4/20.  No bowel issues. Has lower abd pain sometimes sharp and intermittent which is persisting    Type 2 DM- a1c 6.1, urine micro neg, lipids at goal. On ACE I and zocor    Vit d low. Not on vit d supplement    Taking ortho for knee pain- Dr. Mojica started on mobic 3/10/20. ? CHERELLE  Objective:      Physical Exam   Constitutional: She is oriented to person, place, and time. She appears well-developed and well-nourished.   Cardiovascular: Normal rate, regular rhythm and normal heart sounds.   Pulmonary/Chest: Effort normal and breath sounds normal.   Abdominal: Soft. Bowel sounds are normal.  She exhibits no distension and no mass. There is tenderness (LLQ ). There is no guarding.   Musculoskeletal: She exhibits no edema.   Neurological: She is alert and oriented to person, place, and time.   Skin: Skin is warm and dry.   Psychiatric: She has a normal mood and affect.   Nursing note and vitals reviewed.      Assessment:       1. Essential hypertension    2. Hyperlipidemia, unspecified hyperlipidemia type    3. Polycythemia, secondary    4. Vitamin D deficiency disease    5. Controlled type 2 diabetes mellitus without complication, without long-term current use of insulin    6. Gastroesophageal reflux disease, esophagitis presence not specified    7. Decreased GFR    8. Generalized abdominal pain        Plan:       1. Essential hypertension  Controlled on current medications.  Continue current medications.      2. Hyperlipidemia, unspecified hyperlipidemia type  Controlled on current medications.  Continue current medications.      3. Polycythemia, secondary  Normal range H/H. Monitor and treat as indicated    4. Vitamin D deficiency disease  Add and monitor  - ergocalciferol (ERGOCALCIFEROL) 50,000 unit Cap; Take 1 capsule (50,000 Units total) by mouth every 7 days.  Dispense: 12 capsule; Refill: 1    5. Controlled type 2 diabetes mellitus without complication, without long-term current use of insulin  Controlled on current medications.  Continue current medications.      6. Gastroesophageal reflux disease, esophagitis presence not specified  Controlled on current medications.  Continue current medications.      7. Decreased GFR  ? Related to NSAiDS.  Increase fluids. Repeat labs and hold nsaids if persistently depressed GFR  - Basic metabolic panel; Future    8. Generalized abdominal pain  Screen and treat as indicated:    Monitor for worsening symptoms and return to clinic or go to the ER if these occur: fever > 100.4, severe abdominal pain, intractable vomiting, bleeding from the rectum or black tarry  stools, dehydration, lethargy or other worsening symptoms.    - CT Abdomen Pelvis W Wo Contrast; Future        Time spent with patient: 20 minutes    Patient with be reevaluated in 6 months or sooner prn    Greater than 50% of this visit was spent counseling as described in above documentation:Yes

## 2020-05-28 ENCOUNTER — PATIENT MESSAGE (OUTPATIENT)
Dept: FAMILY MEDICINE | Facility: CLINIC | Age: 80
End: 2020-05-28

## 2020-05-28 DIAGNOSIS — N17.9 AKI (ACUTE KIDNEY INJURY): Primary | ICD-10-CM

## 2020-05-29 ENCOUNTER — PATIENT MESSAGE (OUTPATIENT)
Dept: FAMILY MEDICINE | Facility: CLINIC | Age: 80
End: 2020-05-29

## 2020-06-05 ENCOUNTER — LAB VISIT (OUTPATIENT)
Dept: LAB | Facility: HOSPITAL | Age: 80
End: 2020-06-05
Attending: FAMILY MEDICINE
Payer: MEDICARE

## 2020-06-05 DIAGNOSIS — N17.9 AKI (ACUTE KIDNEY INJURY): ICD-10-CM

## 2020-06-05 LAB
ANION GAP SERPL CALC-SCNC: 8 MMOL/L (ref 8–16)
BUN SERPL-MCNC: 24 MG/DL (ref 8–23)
CALCIUM SERPL-MCNC: 9.8 MG/DL (ref 8.7–10.5)
CHLORIDE SERPL-SCNC: 103 MMOL/L (ref 95–110)
CO2 SERPL-SCNC: 29 MMOL/L (ref 23–29)
CREAT SERPL-MCNC: 1 MG/DL (ref 0.5–1.4)
EST. GFR  (AFRICAN AMERICAN): >60 ML/MIN/1.73 M^2
EST. GFR  (NON AFRICAN AMERICAN): 53.7 ML/MIN/1.73 M^2
GLUCOSE SERPL-MCNC: 104 MG/DL (ref 70–110)
POTASSIUM SERPL-SCNC: 4 MMOL/L (ref 3.5–5.1)
SODIUM SERPL-SCNC: 140 MMOL/L (ref 136–145)

## 2020-06-05 PROCEDURE — 36415 COLL VENOUS BLD VENIPUNCTURE: CPT | Mod: PO

## 2020-06-05 PROCEDURE — 80048 BASIC METABOLIC PNL TOTAL CA: CPT

## 2020-06-27 ENCOUNTER — PATIENT MESSAGE (OUTPATIENT)
Dept: FAMILY MEDICINE | Facility: CLINIC | Age: 80
End: 2020-06-27

## 2020-06-27 DIAGNOSIS — R19.00 PELVIC MASS: Primary | ICD-10-CM

## 2020-06-28 DIAGNOSIS — N17.9 AKI (ACUTE KIDNEY INJURY): Primary | ICD-10-CM

## 2020-07-02 ENCOUNTER — LAB VISIT (OUTPATIENT)
Dept: LAB | Facility: HOSPITAL | Age: 80
End: 2020-07-02
Attending: FAMILY MEDICINE
Payer: MEDICARE

## 2020-07-02 DIAGNOSIS — N17.9 AKI (ACUTE KIDNEY INJURY): ICD-10-CM

## 2020-07-02 LAB
ANION GAP SERPL CALC-SCNC: 12 MMOL/L (ref 8–16)
BUN SERPL-MCNC: 22 MG/DL (ref 8–23)
CALCIUM SERPL-MCNC: 9.5 MG/DL (ref 8.7–10.5)
CHLORIDE SERPL-SCNC: 103 MMOL/L (ref 95–110)
CO2 SERPL-SCNC: 25 MMOL/L (ref 23–29)
CREAT SERPL-MCNC: 1 MG/DL (ref 0.5–1.4)
EST. GFR  (AFRICAN AMERICAN): >60 ML/MIN/1.73 M^2
EST. GFR  (NON AFRICAN AMERICAN): 53.7 ML/MIN/1.73 M^2
GLUCOSE SERPL-MCNC: 118 MG/DL (ref 70–110)
POTASSIUM SERPL-SCNC: 3.5 MMOL/L (ref 3.5–5.1)
SODIUM SERPL-SCNC: 140 MMOL/L (ref 136–145)

## 2020-07-02 PROCEDURE — 80048 BASIC METABOLIC PNL TOTAL CA: CPT

## 2020-07-02 PROCEDURE — 36415 COLL VENOUS BLD VENIPUNCTURE: CPT | Mod: PO

## 2020-07-18 ENCOUNTER — HOSPITAL ENCOUNTER (OUTPATIENT)
Dept: RADIOLOGY | Facility: HOSPITAL | Age: 80
Discharge: HOME | End: 2020-07-18
Attending: OBSTETRICS & GYNECOLOGY
Payer: MEDICARE

## 2020-07-18 DIAGNOSIS — R19.09 CENTRAL ABDOMINAL MASS: ICD-10-CM

## 2020-07-18 PROCEDURE — 76830 TRANSVAGINAL US NON-OB: CPT | Mod: TC

## 2020-07-23 ENCOUNTER — LAB VISIT (OUTPATIENT)
Dept: LAB | Facility: HOSPITAL | Age: 80
End: 2020-07-23
Attending: OBSTETRICS & GYNECOLOGY
Payer: MEDICARE

## 2020-07-23 DIAGNOSIS — R19.09 GROIN SWELLING: Primary | ICD-10-CM

## 2020-07-23 PROCEDURE — 36415 COLL VENOUS BLD VENIPUNCTURE: CPT

## 2020-07-23 PROCEDURE — 86304 IMMUNOASSAY TUMOR CA 125: CPT

## 2020-07-23 PROCEDURE — 82378 CARCINOEMBRYONIC ANTIGEN: CPT

## 2020-07-24 LAB
CANCER AG125 SERPL-ACNC: 21 U/ML (ref 0–30)
CEA SERPL-MCNC: 1.3 NG/ML (ref 0–5)

## 2020-08-31 RX ORDER — FAMOTIDINE 40 MG/1
40 TABLET, FILM COATED ORAL NIGHTLY PRN
Qty: 90 TABLET | Refills: 3 | Status: SHIPPED | OUTPATIENT
Start: 2020-08-31 | End: 2020-10-15

## 2020-09-16 ENCOUNTER — PATIENT MESSAGE (OUTPATIENT)
Dept: FAMILY MEDICINE | Facility: CLINIC | Age: 80
End: 2020-09-16

## 2020-09-17 ENCOUNTER — CLINICAL SUPPORT (OUTPATIENT)
Dept: URGENT CARE | Facility: CLINIC | Age: 80
End: 2020-09-17
Payer: MEDICARE

## 2020-09-17 DIAGNOSIS — Z01.818 PRE-OP EXAM: ICD-10-CM

## 2020-09-17 LAB — SARS-COV-2 RNA RESP QL NAA+PROBE: NOT DETECTED

## 2020-09-17 PROCEDURE — U0003 INFECTIOUS AGENT DETECTION BY NUCLEIC ACID (DNA OR RNA); SEVERE ACUTE RESPIRATORY SYNDROME CORONAVIRUS 2 (SARS-COV-2) (CORONAVIRUS DISEASE [COVID-19]), AMPLIFIED PROBE TECHNIQUE, MAKING USE OF HIGH THROUGHPUT TECHNOLOGIES AS DESCRIBED BY CMS-2020-01-R: HCPCS

## 2020-09-21 PROBLEM — R19.03 RIGHT LOWER QUADRANT ABDOMINAL SWELLING, MASS AND LUMP: Status: ACTIVE | Noted: 2020-09-21

## 2020-10-01 PROBLEM — C56.1: Status: ACTIVE | Noted: 2020-10-01

## 2020-10-02 DIAGNOSIS — C56.1 MALIGNANT NEOPLASM OF RIGHT OVARY: Primary | ICD-10-CM

## 2020-10-13 ENCOUNTER — HOSPITAL ENCOUNTER (OUTPATIENT)
Dept: RADIOLOGY | Facility: HOSPITAL | Age: 80
Discharge: HOME | End: 2020-10-13
Attending: OBSTETRICS & GYNECOLOGY
Payer: MEDICARE

## 2020-10-13 VITALS — HEIGHT: 63 IN | BODY MASS INDEX: 26.05 KG/M2 | WEIGHT: 147 LBS

## 2020-10-13 DIAGNOSIS — C56.1 MALIGNANT NEOPLASM OF RIGHT OVARY: ICD-10-CM

## 2020-10-13 LAB — GLUCOSE SERPL-MCNC: 99 MG/DL (ref 70–110)

## 2020-10-13 PROCEDURE — 78815 PET IMAGE W/CT SKULL-THIGH: CPT | Mod: TC,PO,PI

## 2020-10-13 PROCEDURE — 82962 GLUCOSE BLOOD TEST: CPT | Mod: PO

## 2020-10-14 DIAGNOSIS — C56.9 MALIGNANT NEOPLASM OF OVARY, UNSPECIFIED LATERALITY: Primary | ICD-10-CM

## 2020-10-14 DIAGNOSIS — Z51.11 ENCOUNTER FOR ANTINEOPLASTIC CHEMOTHERAPY: ICD-10-CM

## 2020-10-15 ENCOUNTER — DOCUMENTATION ONLY (OUTPATIENT)
Dept: INFUSION THERAPY | Facility: HOSPITAL | Age: 80
End: 2020-10-15

## 2020-10-15 ENCOUNTER — HOSPITAL ENCOUNTER (OUTPATIENT)
Dept: RADIOLOGY | Facility: HOSPITAL | Age: 80
Discharge: HOME | End: 2020-10-15
Attending: SURGERY
Payer: MEDICARE

## 2020-10-15 ENCOUNTER — HOSPITAL ENCOUNTER (OUTPATIENT)
Dept: PREADMISSION TESTING | Facility: HOSPITAL | Age: 80
Discharge: HOME | End: 2020-10-15
Attending: SURGERY
Payer: MEDICARE

## 2020-10-15 VITALS
BODY MASS INDEX: 25.78 KG/M2 | DIASTOLIC BLOOD PRESSURE: 82 MMHG | OXYGEN SATURATION: 97 % | SYSTOLIC BLOOD PRESSURE: 146 MMHG | RESPIRATION RATE: 18 BRPM | HEIGHT: 63 IN | WEIGHT: 145.5 LBS | HEART RATE: 86 BPM | TEMPERATURE: 97 F

## 2020-10-15 DIAGNOSIS — Z01.818 PREOP TESTING: ICD-10-CM

## 2020-10-15 DIAGNOSIS — Z01.818 PREOP TESTING: Primary | ICD-10-CM

## 2020-10-15 DIAGNOSIS — Z41.9 SURGERY, ELECTIVE: Primary | ICD-10-CM

## 2020-10-15 DIAGNOSIS — Z01.818 PRE-OP TESTING: ICD-10-CM

## 2020-10-15 RX ORDER — CEFAZOLIN SODIUM 2 G/50ML
2 SOLUTION INTRAVENOUS ONCE
Status: CANCELLED | OUTPATIENT
Start: 2020-10-19

## 2020-10-15 RX ORDER — FAMOTIDINE 40 MG/1
40 TABLET, FILM COATED ORAL DAILY
COMMUNITY
End: 2021-04-09

## 2020-10-15 NOTE — DISCHARGE INSTRUCTIONS
To confirm, Your doctor has instructed you that surgery is scheduled for: Oct. 19  Covid swab Oct 16 @ 10:45am     Pre-Op will call the afternoon prior to surgery between 4:00 and 6:00 PM with the final arrival time.     1 Person can come with you the day of surgery.  Enter at [facility] [address] and come through front entrance.  You will be screened/ have temperature checked.    You may have 1 visitor who will also be screened.     Check in at registration.   After registration, proceed past gift shop and through glass door ( [facility] [place]) Check in at the nurses station to the left.   Do not eat or drink anything after midnight the night before your surgery - THIS INCLUDES  WATER, GUM, MINTS AND CANDY.  YOU MAY BRUSH YOUR TEETH BUT DO NOT SWALLOW     TAKE ONLY THESE MEDICATIONS WITH A SMALL SIP OF WATER THE MORNING OF YOUR PROCEDURE: Famotidine    Do not take Metformin morning of surgery   ONLY if you are diabetic, check your sugar in the morning before your procedure.       Do not take any diabetic medicines or insulin the morning of surgery .     PLEASE NOTE:  The surgery schedule has many variables which may affect the time of your surgery case.  Family members should be available if your surgery time changes.  Plan to be here the day of your procedure between 4-6 hours.      DO NOT TAKE THESE MEDICATIONS 5-7 DAYS PRIOR to your procedure or per your surgeon's request: ASPIRIN, ALEVE, ADVIL, IBUPROFEN,  MELY SELTZER, BC , FISH OIL , VITAMIN E, HERBALS  (May take Tylenol)                                                          IMPORTANT INSTRUCTIONS      · Do not smoke, vape or drink alcoholic beverages 24 hours prior to your procedure.  · Shower the night before AND the morning of your procedure with a Chlorhexidine wash such as Hibiclens or Dial antibacterial soap from the neck down.   ·  Do not get it on your face or in your eyes.  You may use your own shampoo and face wash. This helps your skin to be as  bacteria free as possible.   ·  DO NOT remove hair from the surgery site.  Do not shave the incision site unless you are given specific instructions to do so.    · Sleep in a bed with clean sheets.  Do not sleep with a pet in the bed.   · If you wear contact lenses, dentures, hearing aids or glasses, bring a container to put them in during surgery and give to a family member for safe keeping.    · Please leave all jewelry, piercing's and valuables at home.   · Wear rubber soled shoes (no flip flops).    · Make arrangements in advance for transportation home by a responsible adult.      · You must make arrangements for transportation, TAXI'S, UBER'S OR LYFTS ARE NOT ALLOWED.        If you have any questions about these instructions, call (Monday - Friday) Pre-Op Admit  Nursing  at [phone] or the Pre-Op Day Surgery Unit at [phone].

## 2020-10-15 NOTE — PROGRESS NOTES
Oncology Nutrition   Chemotherapy Infusion Visit  Alexa Otero   1940    Nutrition Education   This is a 80 y.o.female with a medical diagnosis of carcinoma of right ovary. Pt sent over from Dr. Ray's office. Met with pt today for new patient education. Reviewed role of Nutrition during cancer treatment. Educated on food safety and common nutrition impact symptoms associated with chemotherapy treatment. Reinforced the importance of good hydration. Provided pt with a tour of the infusion suit. Introduced pt to patient navigator. Pt does not identify as being at nutritional risk at this time nor being food insecure.     Answered all nutrition related questions.     RD to f/u with patient throughout treatment as needed.    MEGHAN BISHOP MA, RD, LDN  10/15/2020  5:02 PM

## 2020-10-16 ENCOUNTER — HOSPITAL ENCOUNTER (OUTPATIENT)
Dept: PREADMISSION TESTING | Facility: HOSPITAL | Age: 80
Discharge: HOME | End: 2020-10-16
Attending: SURGERY
Payer: MEDICARE

## 2020-10-16 DIAGNOSIS — Z01.818 PRE-OP TESTING: ICD-10-CM

## 2020-10-16 PROCEDURE — U0003 INFECTIOUS AGENT DETECTION BY NUCLEIC ACID (DNA OR RNA); SEVERE ACUTE RESPIRATORY SYNDROME CORONAVIRUS 2 (SARS-COV-2) (CORONAVIRUS DISEASE [COVID-19]), AMPLIFIED PROBE TECHNIQUE, MAKING USE OF HIGH THROUGHPUT TECHNOLOGIES AS DESCRIBED BY CMS-2020-01-R: HCPCS

## 2020-10-16 RX ORDER — FAMOTIDINE 10 MG/ML
20 INJECTION INTRAVENOUS
Status: CANCELLED | OUTPATIENT
Start: 2020-10-20

## 2020-10-16 RX ORDER — DIPHENHYDRAMINE HYDROCHLORIDE 50 MG/ML
50 INJECTION INTRAMUSCULAR; INTRAVENOUS ONCE AS NEEDED
Status: CANCELLED | OUTPATIENT
Start: 2020-10-20

## 2020-10-16 RX ORDER — SODIUM CHLORIDE 0.9 % (FLUSH) 0.9 %
10 SYRINGE (ML) INJECTION
Status: CANCELLED | OUTPATIENT
Start: 2020-10-20

## 2020-10-16 RX ORDER — EPINEPHRINE 0.3 MG/.3ML
0.3 INJECTION SUBCUTANEOUS ONCE AS NEEDED
Status: CANCELLED | OUTPATIENT
Start: 2020-10-20

## 2020-10-16 RX ORDER — HEPARIN 100 UNIT/ML
500 SYRINGE INTRAVENOUS
Status: CANCELLED | OUTPATIENT
Start: 2020-10-20

## 2020-10-17 LAB — SARS-COV-2 RNA RESP QL NAA+PROBE: NOT DETECTED

## 2020-10-19 ENCOUNTER — ANESTHESIA (OUTPATIENT)
Dept: SURGERY | Facility: HOSPITAL | Age: 80
End: 2020-10-19
Payer: MEDICARE

## 2020-10-19 ENCOUNTER — HOSPITAL ENCOUNTER (OUTPATIENT)
Facility: HOSPITAL | Age: 80
Discharge: HOME | End: 2020-10-19
Attending: SURGERY | Admitting: SURGERY
Payer: MEDICARE

## 2020-10-19 ENCOUNTER — ANESTHESIA EVENT (OUTPATIENT)
Dept: SURGERY | Facility: HOSPITAL | Age: 80
End: 2020-10-19
Payer: MEDICARE

## 2020-10-19 VITALS
TEMPERATURE: 98 F | OXYGEN SATURATION: 96 % | BODY MASS INDEX: 25.69 KG/M2 | SYSTOLIC BLOOD PRESSURE: 135 MMHG | WEIGHT: 145 LBS | DIASTOLIC BLOOD PRESSURE: 73 MMHG | HEIGHT: 63 IN | HEART RATE: 88 BPM | RESPIRATION RATE: 16 BRPM

## 2020-10-19 DIAGNOSIS — Z41.9 SURGERY, ELECTIVE: ICD-10-CM

## 2020-10-19 DIAGNOSIS — C56.1 CARCINOMA OF RIGHT OVARY: Primary | ICD-10-CM

## 2020-10-19 LAB
GLUCOSE SERPL-MCNC: 110 MG/DL (ref 70–110)
GLUCOSE SERPL-MCNC: 122 MG/DL (ref 70–110)

## 2020-10-19 PROCEDURE — 36000706: Performed by: SURGERY

## 2020-10-19 PROCEDURE — C1788 PORT, INDWELLING, IMP: HCPCS | Performed by: SURGERY

## 2020-10-19 PROCEDURE — 36000707: Performed by: SURGERY

## 2020-10-19 PROCEDURE — 25000003 PHARM REV CODE 250: Performed by: SURGERY

## 2020-10-19 PROCEDURE — 27202103: Performed by: ANESTHESIOLOGY

## 2020-10-19 PROCEDURE — 25000003 PHARM REV CODE 250: Performed by: NURSE ANESTHETIST, CERTIFIED REGISTERED

## 2020-10-19 PROCEDURE — 27202105 HC BIS BILATERAL SENSOR: Performed by: ANESTHESIOLOGY

## 2020-10-19 PROCEDURE — 27000673 HC TUBING BLOOD Y: Performed by: ANESTHESIOLOGY

## 2020-10-19 PROCEDURE — 27201423 OPTIME MED/SURG SUP & DEVICES STERILE SUPPLY: Performed by: SURGERY

## 2020-10-19 PROCEDURE — 71000033 HC RECOVERY, INTIAL HOUR: Performed by: SURGERY

## 2020-10-19 PROCEDURE — 37000009 HC ANESTHESIA EA ADD 15 MINS: Performed by: SURGERY

## 2020-10-19 PROCEDURE — 63600175 PHARM REV CODE 636 W HCPCS: Performed by: SURGERY

## 2020-10-19 PROCEDURE — 25000003 PHARM REV CODE 250: Performed by: ANESTHESIOLOGY

## 2020-10-19 PROCEDURE — 27000653 HC CATH, IV CATHLN: Performed by: ANESTHESIOLOGY

## 2020-10-19 PROCEDURE — 63600175 PHARM REV CODE 636 W HCPCS: Performed by: NURSE ANESTHETIST, CERTIFIED REGISTERED

## 2020-10-19 PROCEDURE — 82962 GLUCOSE BLOOD TEST: CPT | Performed by: SURGERY

## 2020-10-19 PROCEDURE — 27000671 HC TUBING MICROBORE EXT: Performed by: ANESTHESIOLOGY

## 2020-10-19 PROCEDURE — 71000015 HC POSTOP RECOV 1ST HR: Performed by: SURGERY

## 2020-10-19 PROCEDURE — 27200651 HC AIRWAY, LMA: Performed by: ANESTHESIOLOGY

## 2020-10-19 PROCEDURE — 37000008 HC ANESTHESIA 1ST 15 MINUTES: Performed by: SURGERY

## 2020-10-19 DEVICE — PORT POWER MRI 8FR 1808000: Type: IMPLANTABLE DEVICE | Site: CHEST  WALL | Status: FUNCTIONAL

## 2020-10-19 RX ORDER — FENTANYL CITRATE 50 UG/ML
25 INJECTION, SOLUTION INTRAMUSCULAR; INTRAVENOUS EVERY 5 MIN PRN
Status: DISCONTINUED | OUTPATIENT
Start: 2020-10-19 | End: 2020-10-19 | Stop reason: HOSPADM

## 2020-10-19 RX ORDER — FENTANYL CITRATE 50 UG/ML
INJECTION, SOLUTION INTRAMUSCULAR; INTRAVENOUS
Status: DISCONTINUED | OUTPATIENT
Start: 2020-10-19 | End: 2020-10-19

## 2020-10-19 RX ORDER — CEFAZOLIN SODIUM 2 G/50ML
2 SOLUTION INTRAVENOUS ONCE
Status: COMPLETED | OUTPATIENT
Start: 2020-10-19 | End: 2020-10-19

## 2020-10-19 RX ORDER — SODIUM CHLORIDE, SODIUM LACTATE, POTASSIUM CHLORIDE, CALCIUM CHLORIDE 600; 310; 30; 20 MG/100ML; MG/100ML; MG/100ML; MG/100ML
INJECTION, SOLUTION INTRAVENOUS CONTINUOUS PRN
Status: DISCONTINUED | OUTPATIENT
Start: 2020-10-19 | End: 2020-10-19

## 2020-10-19 RX ORDER — ACETAMINOPHEN 325 MG/1
650 TABLET ORAL EVERY 4 HOURS PRN
Status: DISCONTINUED | OUTPATIENT
Start: 2020-10-19 | End: 2020-10-19 | Stop reason: HOSPADM

## 2020-10-19 RX ORDER — PROPOFOL 10 MG/ML
INJECTION, EMULSION INTRAVENOUS
Status: DISCONTINUED | OUTPATIENT
Start: 2020-10-19 | End: 2020-10-19

## 2020-10-19 RX ORDER — ACETAMINOPHEN 500 MG
1000 TABLET ORAL ONCE
Status: COMPLETED | OUTPATIENT
Start: 2020-10-19 | End: 2020-10-19

## 2020-10-19 RX ORDER — ONDANSETRON 2 MG/ML
4 INJECTION INTRAMUSCULAR; INTRAVENOUS EVERY 12 HOURS PRN
Status: DISCONTINUED | OUTPATIENT
Start: 2020-10-19 | End: 2020-10-19 | Stop reason: HOSPADM

## 2020-10-19 RX ORDER — FAMOTIDINE 10 MG/ML
INJECTION INTRAVENOUS
Status: DISCONTINUED | OUTPATIENT
Start: 2020-10-19 | End: 2020-10-19

## 2020-10-19 RX ORDER — SODIUM CHLORIDE 9 MG/ML
INJECTION, SOLUTION INTRAVENOUS
Status: COMPLETED | OUTPATIENT
Start: 2020-10-19 | End: 2020-10-19

## 2020-10-19 RX ORDER — ONDANSETRON 2 MG/ML
4 INJECTION INTRAMUSCULAR; INTRAVENOUS DAILY PRN
Status: DISCONTINUED | OUTPATIENT
Start: 2020-10-19 | End: 2020-10-19 | Stop reason: HOSPADM

## 2020-10-19 RX ORDER — ONDANSETRON 2 MG/ML
INJECTION INTRAMUSCULAR; INTRAVENOUS
Status: DISCONTINUED | OUTPATIENT
Start: 2020-10-19 | End: 2020-10-19

## 2020-10-19 RX ORDER — LIDOCAINE HCL/PF 100 MG/5ML
SYRINGE (ML) INTRAVENOUS
Status: DISCONTINUED | OUTPATIENT
Start: 2020-10-19 | End: 2020-10-19

## 2020-10-19 RX ORDER — OXYCODONE HYDROCHLORIDE 5 MG/1
5 TABLET ORAL
Status: DISCONTINUED | OUTPATIENT
Start: 2020-10-19 | End: 2020-10-19 | Stop reason: HOSPADM

## 2020-10-19 RX ORDER — ACETAMINOPHEN 10 MG/ML
1000 INJECTION, SOLUTION INTRAVENOUS ONCE
Status: DISCONTINUED | OUTPATIENT
Start: 2020-10-19 | End: 2020-10-19 | Stop reason: HOSPADM

## 2020-10-19 RX ORDER — SODIUM CHLORIDE 0.9 % (FLUSH) 0.9 %
10 SYRINGE (ML) INJECTION
Status: DISCONTINUED | OUTPATIENT
Start: 2020-10-19 | End: 2020-10-19 | Stop reason: HOSPADM

## 2020-10-19 RX ORDER — BUPIVACAINE HCL/EPINEPHRINE 0.25-.0005
VIAL (ML) INJECTION
Status: DISCONTINUED | OUTPATIENT
Start: 2020-10-19 | End: 2020-10-19 | Stop reason: HOSPADM

## 2020-10-19 RX ORDER — HYDROCODONE BITARTRATE AND ACETAMINOPHEN 5; 325 MG/1; MG/1
1 TABLET ORAL EVERY 8 HOURS PRN
Qty: 12 TABLET | Refills: 0 | Status: SHIPPED | OUTPATIENT
Start: 2020-10-19 | End: 2020-11-24

## 2020-10-19 RX ORDER — HYDROCODONE BITARTRATE AND ACETAMINOPHEN 5; 325 MG/1; MG/1
1 TABLET ORAL EVERY 6 HOURS PRN
Status: DISCONTINUED | OUTPATIENT
Start: 2020-10-19 | End: 2020-10-19 | Stop reason: HOSPADM

## 2020-10-19 RX ORDER — HEPARIN SODIUM 1000 [USP'U]/ML
INJECTION, SOLUTION INTRAVENOUS; SUBCUTANEOUS
Status: DISCONTINUED | OUTPATIENT
Start: 2020-10-19 | End: 2020-10-19 | Stop reason: HOSPADM

## 2020-10-19 RX ADMIN — SODIUM CHLORIDE, SODIUM LACTATE, POTASSIUM CHLORIDE, AND CALCIUM CHLORIDE: .6; .31; .03; .02 INJECTION, SOLUTION INTRAVENOUS at 07:10

## 2020-10-19 RX ADMIN — ACETAMINOPHEN 1000 MG: 500 TABLET, FILM COATED ORAL at 06:10

## 2020-10-19 RX ADMIN — LIDOCAINE HYDROCHLORIDE 100 MG: 20 INJECTION, SOLUTION INTRAVENOUS at 07:10

## 2020-10-19 RX ADMIN — PROPOFOL 120 MG: 10 INJECTION, EMULSION INTRAVENOUS at 07:10

## 2020-10-19 RX ADMIN — SODIUM CHLORIDE, SODIUM LACTATE, POTASSIUM CHLORIDE, AND CALCIUM CHLORIDE: .6; .31; .03; .02 INJECTION, SOLUTION INTRAVENOUS at 08:10

## 2020-10-19 RX ADMIN — CEFAZOLIN SODIUM 2 G: 2 SOLUTION INTRAVENOUS at 07:10

## 2020-10-19 RX ADMIN — FENTANYL CITRATE 50 MCG: 50 INJECTION INTRAMUSCULAR; INTRAVENOUS at 07:10

## 2020-10-19 RX ADMIN — FAMOTIDINE 20 MG: 10 INJECTION, SOLUTION INTRAVENOUS at 07:10

## 2020-10-19 RX ADMIN — ONDANSETRON 4 MG: 2 INJECTION INTRAMUSCULAR; INTRAVENOUS at 07:10

## 2020-10-19 NOTE — OP NOTE
History of right ovarian cancer  Postop same  Procedures port placement  Patient was placed supine on operating table where satisfactory general anesthesia.  Both arms were tucked.  Clavicular areas were prepped and draped in a sterile fashion bilaterally.  Made a transverse parasternal incision on the right developed a pocket on the port on top the muscle.  Stab incision was made below the lateral 3rd of the right clavicle.  Port was sewn in with interrupted permanent suture.  Patient was then placed in Trendelenburg.  Needle was inserted into the subclavian vein a guidewire was threaded into superior vena cava.  This was confirmed with the C-arm.  Catheter was cut to the appropriate length.  Dilator and outer sheath were placed over the guidewire.  The guidewire and dilator were removed.  The catheter was fed down through the sheath which was then removed.  This left the tip of the catheter in the proximal superior vena cava.  Port was aspirated found to be functioning well.  Incisions were closed with absorbable suture.  Patient tolerated procedure well indication.

## 2020-10-19 NOTE — DISCHARGE INSTRUCTIONS
DISCHARGE INSTRUCTIONS    No driving, operating heavy machinery or signing legal documents x 24 hours  No drinking alcohol x 24 hours or while taking pain medication  You should not be driving while taking pain medication  May remove dressing in 48 hours and shower. Leave wound open to air after.  Do Not submerge wound in a tub, any pools of water or swimming pools until wound is completely healed  Keep an eye on wound- edges should be pink and well approximated-  the wound should not be red and inflamed.  Wound edges should not be .   Your wound should not be draining anything green, yellow or foul smelling- for these call the MD  You should not be running a temp greater than 101     Schedule Follow up  Put port card in wallet              Discharge Instructions: After Your Surgery  Youve just had surgery. During surgery, you were given medicine called anesthesia to keep you relaxed and free of pain. After surgery, you may have some pain or nausea. This is common. Here are some tips for feeling better and getting well after surgery.     Stay on schedule with your medicine.   Going home  Your healthcare provider will show you how to take care of yourself when you go home. He or she will also answer your questions. Have an adult family member or friend drive you home. For the first 24 hours after your surgery:  · Do not drive or use heavy equipment.  · Do not make important decisions or sign legal papers.  · Do not drink alcohol.  · Have someone stay with you, if needed. He or she can watch for problems and help keep you safe.  Be sure to go to all follow-up visits with your healthcare provider. And rest after your surgery for as long as your healthcare provider tells you to.  Coping with pain  If you have pain after surgery, pain medicine will help you feel better. Take it as told, before pain becomes severe. Also, ask your healthcare provider or pharmacist about other ways to control pain. This might  be with heat, ice, or relaxation. And follow any other instructions your surgeon or nurse gives you.  Tips for taking pain medicine  To get the best relief possible, remember these points:  · Pain medicines can upset your stomach. Taking them with a little food may help.  · Most pain relievers taken by mouth need at least 20 to 30 minutes to start to work.  · Taking medicine on a schedule can help you remember to take it. Try to time your medicine so that you can take it before starting an activity. This might be before you get dressed, go for a walk, or sit down for dinner.  · Constipation is a common side effect of pain medicines. Call your healthcare provider before taking any medicines such as laxatives or stool softeners to help ease constipation. Also ask if you should skip any foods. Drinking lots of fluids and eating foods such as fruits and vegetables that are high in fiber can also help. Remember, do not take laxatives unless your surgeon has prescribed them.  · Drinking alcohol and taking pain medicine can cause dizziness and slow your breathing. It can even be deadly. Do not drink alcohol while taking pain medicine.  · Pain medicine can make you react more slowly to things. Do not drive or run machinery while taking pain medicine.  Your healthcare provider may tell you to take acetaminophen to help ease your pain. Ask him or her how much you are supposed to take each day. Acetaminophen or other pain relievers may interact with your prescription medicines or other over-the-counter (OTC) medicines. Some prescription medicines have acetaminophen and other ingredients. Using both prescription and OTC acetaminophen for pain can cause you to overdose. Read the labels on your OTC medicines with care. This will help you to clearly know the list of ingredients, how much to take, and any warnings. It may also help you not take too much acetaminophen. If you have questions or do not understand the information, ask  your pharmacist or healthcare provider to explain it to you before you take the OTC medicine.  Managing nausea  Some people have an upset stomach after surgery. This is often because of anesthesia, pain, or pain medicine, or the stress of surgery. These tips will help you handle nausea and eat healthy foods as you get better. If you were on a special food plan before surgery, ask your healthcare provider if you should follow it while you get better. These tips may help:  · Do not push yourself to eat. Your body will tell you when to eat and how much.  · Start off with clear liquids and soup. They are easier to digest.  · Next try semi-solid foods, such as mashed potatoes, applesauce, and gelatin, as you feel ready.  · Slowly move to solid foods. Dont eat fatty, rich, or spicy foods at first.  · Do not force yourself to have 3 large meals a day. Instead eat smaller amounts more often.  · Take pain medicines with a small amount of solid food, such as crackers or toast, to avoid nausea.     Call your surgeon if  · You still have pain an hour after taking medicine. The medicine may not be strong enough.  · You feel too sleepy, dizzy, or groggy. The medicine may be too strong.  · You have side effects like nausea, vomiting, or skin changes, such as rash, itching, or hives.       If you have obstructive sleep apnea  You were given anesthesia medicine during surgery to keep you comfortable and free of pain. After surgery, you may have more apnea spells because of this medicine and other medicines you were given. The spells may last longer than usual.   At home:  · Keep using the continuous positive airway pressure (CPAP) device when you sleep. Unless your healthcare provider tells you not to, use it when you sleep, day or night. CPAP is a common device used to treat obstructive sleep apnea.  · Talk with your provider before taking any pain medicine, muscle relaxants, or sedatives. Your provider will tell you about the  possible dangers of taking these medicines.  Date Last Reviewed: 12/1/2016  © 0582-4000 [employer], "[employer] [address] [place] [address], [place] [address]. All rights reserved. This information is not intended as a substitute for professional medical care. Always follow your healthcare professional's instructions.

## 2020-10-19 NOTE — ANESTHESIA POSTPROCEDURE EVALUATION
Anesthesia Post Evaluation    Patient: Alexa Otero    Procedure(s) Performed: Procedure(s) (LRB):  INSERTION, PORT-A-CATH (Right)    Final Anesthesia Type: general    Patient location during evaluation: PACU  Patient participation: Yes- Able to Participate  Level of consciousness: awake and alert  Post-procedure vital signs: reviewed and stable  Pain management: adequate  Airway patency: patent  MARIZA mitigation strategies: Multimodal analgesia, Extubation while patient is awake, Verification of full reversal of neuromuscular block and Extubation and recovery carried out in lateral, semiupright, or other nonsupine position  PONV status at discharge: No PONV  Anesthetic complications: no      Cardiovascular status: hemodynamically stable  Respiratory status: unassisted, spontaneous ventilation and room air  Hydration status: euvolemic  Follow-up not needed.          Vitals Value Taken Time   /70 10/19/20 0915   Temp 36.3 °C (97.4 °F) 10/19/20 0900   Pulse 90 10/19/20 0921   Resp 6 10/19/20 0901   SpO2 94 % 10/19/20 0921   Vitals shown include unvalidated device data.      Event Time   Out of Recovery 09:22:56         Pain/Jess Score: Pain Rating Prior to Med Admin: 0 (10/19/2020  6:40 AM)  Jess Score: 8 (10/19/2020  9:00 AM)

## 2020-10-19 NOTE — TRANSFER OF CARE
"Anesthesia Transfer of Care Note    Patient: Alexa Otero    Procedure(s) Performed: Procedure(s) (LRB):  INSERTION, PORT-A-CATH (Right)    Patient location: PACU    Anesthesia Type: general    Transport from OR: Transported from OR on room air with adequate spontaneous ventilation    Post pain: adequate analgesia    Post assessment: no apparent anesthetic complications    Post vital signs: stable    Level of consciousness: awake and alert    Nausea/Vomiting: no nausea/vomiting    Complications: none    Transfer of care protocol was followed      Last vitals:   Visit Vitals  BP (!) 155/82 (BP Location: Left arm, Patient Position: Sitting)   Pulse 94   Temp 36.9 °C (98.5 °F) (Oral)   Resp 16   Ht 5' 3" (1.6 m)   Wt 65.8 kg (145 lb)   Breastfeeding No   BMI 25.69 kg/m²     "

## 2020-10-19 NOTE — ANESTHESIA PREPROCEDURE EVALUATION
10/19/2020  Alexa Otero is a 80 y.o., female.    Anesthesia Evaluation    I have reviewed the Patient Summary Reports.    I have reviewed the Nursing Notes.    I have reviewed the Medications.     Review of Systems  Anesthesia Hx:  No previous Anesthesia Hx of Anesthetic complications (PONV)  Neg history of prior surgery. Denies Family Hx of Anesthesia complications.   Denies Personal Hx of Anesthesia complications.   Social:  Former Smoker, Alcohol Use    EENT/Dental:   Mac degeneration   Cardiovascular:   Hypertension hyperlipidemia    Hepatic/GI:   GERD, well controlled Liver disease: Hepatic cyst.    Musculoskeletal:   Arthritis: Right ankle.     Neurological:   Neuromuscular disease: Sciatica.    Endocrine:   Diabetes, type 2        Patient Active Problem List   Diagnosis    Sciatica    Syncope and collapse    Polycythemia, secondary    Hyperlipemia    Diabetes mellitus type 2, controlled    HTN (hypertension)    Acute chest pain    Vitamin D deficiency disease    Gastroesophageal reflux disease    History of Kwon's esophagus    Pancreatic pseudocyst/cyst    Hepatic cyst    Low back pain radiating to both legs    Primary osteoarthritis of right ankle    Kwon's esophagus    Low back pain    Dupuytren's contracture of both hands    Right lower quadrant abdominal swelling, mass and lump    Carcinoma of right ovary-Dr. Ray       Past Surgical History:   Procedure Laterality Date    AUGMENTATION OF BREAST      CHOLECYSTECTOMY      ESOPHAGOGASTRODUODENOSCOPY N/A 6/20/2018    Procedure: EGD (ESOPHAGOGASTRODUODENOSCOPY);  Surgeon: Sabino Stephenson MD;  Location[facility];  Service: Endoscopy;  Laterality: N/A;    HYSTERECTOMY      partial    ROBOT-ASSISTED LAPAROSCOPIC LYMPHADENECTOMY USING DA NELLA XI N/A 9/21/2020    Procedure: XI ROBOTIC LYMPHADENECTOMY-pelvic and  kell-aortic;  Surgeon: Altagracia Yanez MD;  Location: [facility] OR;  Service: OB/GYN;  Laterality: N/A;    ROBOT-ASSISTED LAPAROSCOPIC OMENTECTOMY USING DA NELLA XI N/A 9/21/2020    Procedure: XI ROBOTIC OMENTECTOMY;  Surgeon: Altagracia Yanez MD;  Location: [facility] OR;  Service: OB/GYN;  Laterality: N/A;    ROBOT-ASSISTED LAPAROSCOPIC SALPINGO-OOPHORECTOMY USING DA NELLA XI Bilateral 9/21/2020    Procedure: XI ROBOTIC SALPINGO-OOPHORECTOMY;  Surgeon: Altagracia Yanez MD;  Location: [facility] OR;  Service: OB/GYN;  Laterality: Bilateral;        Tobacco Use:  The patient  reports that she has quit smoking. Her smoking use included cigarettes. She has a 20.00 pack-year smoking history. She has never used smokeless tobacco.     Results for orders placed or performed during the hospital encounter of 09/18/20   EKG 12-lead    Collection Time: 09/18/20 12:10 PM    Narrative    Test Reason : R19.03,    Vent. Rate : 082 BPM     Atrial Rate : 082 BPM     P-R Int : 142 ms          QRS Dur : 068 ms      QT Int : 370 ms       P-R-T Axes : 071 036 071 degrees     QTc Int : 432 ms    Normal sinus rhythm  Normal ECG  When compared with ECG of 27-AUG-2014 13:59,  No significant change was found  Confirmed by Laura LAWSON, Parmjit LAZO ([id number]) on 9/18/2020 3:05:02 PM    Referred By: ALTAGRACIA YANEZ           Confirmed By:Parmjit Sanchez MD             Lab Results   Component Value Date    WBC 6.68 10/15/2020    HGB 12.0 10/15/2020    HCT 37.1 10/15/2020    MCV 88 10/15/2020     10/15/2020     BMP  Lab Results   Component Value Date     10/15/2020    K 3.6 10/15/2020     10/15/2020    CO2 28 10/15/2020    BUN 20 10/15/2020    CREATININE 0.9 10/15/2020    CALCIUM 9.6 10/15/2020    ANIONGAP 8 10/15/2020     (H) 10/15/2020     (H) 09/21/2020     (H) 09/18/2020       No results found for this or any previous visit.        Physical Exam  General:  Well nourished    Airway/Jaw/Neck:  Airway Findings: Mouth  Opening: Normal Tongue: Normal  General Airway Assessment: Adult  Mallampati: II  Improves to II with phonation.  TM Distance: Normal, at least 6 cm  Jaw/Neck Findings:  Neck ROM: Normal ROM       Chest/Lungs:  Chest/Lungs Findings: Clear to auscultation, Normal Respiratory Rate     Heart/Vascular:  Heart Findings: Rate: Normal  Rhythm: Regular Rhythm  Sounds: Normal        Mental Status:  Mental Status Findings:  Cooperative, Alert and Oriented         Anesthesia Plan  Type of Anesthesia, risks & benefits discussed:  Anesthesia Type:  general  Patient's Preference:   Intra-op Monitoring Plan: standard ASA monitors  Intra-op Monitoring Plan Comments:   Post Op Pain Control Plan: multimodal analgesia  Post Op Pain Control Plan Comments:   Induction:   IV  Beta Blocker:  Patient is not currently on a Beta-Blocker (No further documentation required).       Informed Consent: Patient understands risks and agrees with Anesthesia plan.  Questions answered. Anesthesia consent signed with patient.  ASA Score: 3     Day of Surgery Review of History & Physical:    H&P update referred to the surgeon.     Anesthesia Plan Notes: Tylenol 1 gm po ODS  Zofran 4 mg, Pepcid 20 mg,  No Versed,        Ready For Surgery From Anesthesia Perspective.

## 2020-10-20 ENCOUNTER — INFUSION (OUTPATIENT)
Dept: INFUSION THERAPY | Facility: HOSPITAL | Age: 80
End: 2020-10-20
Attending: OBSTETRICS & GYNECOLOGY
Payer: MEDICARE

## 2020-10-20 ENCOUNTER — DOCUMENTATION ONLY (OUTPATIENT)
Dept: INFUSION THERAPY | Facility: HOSPITAL | Age: 80
End: 2020-10-20

## 2020-10-20 VITALS
WEIGHT: 145 LBS | BODY MASS INDEX: 25.69 KG/M2 | HEART RATE: 107 BPM | SYSTOLIC BLOOD PRESSURE: 154 MMHG | RESPIRATION RATE: 16 BRPM | TEMPERATURE: 98 F | DIASTOLIC BLOOD PRESSURE: 82 MMHG | HEIGHT: 63 IN

## 2020-10-20 DIAGNOSIS — C56.1 CARCINOMA OF RIGHT OVARY: Primary | ICD-10-CM

## 2020-10-20 PROCEDURE — 96415 CHEMO IV INFUSION ADDL HR: CPT | Mod: PN

## 2020-10-20 PROCEDURE — 63600175 PHARM REV CODE 636 W HCPCS: Mod: PN | Performed by: OBSTETRICS & GYNECOLOGY

## 2020-10-20 PROCEDURE — A4216 STERILE WATER/SALINE, 10 ML: HCPCS | Mod: PN | Performed by: OBSTETRICS & GYNECOLOGY

## 2020-10-20 PROCEDURE — 25000003 PHARM REV CODE 250: Mod: PN | Performed by: OBSTETRICS & GYNECOLOGY

## 2020-10-20 PROCEDURE — 96413 CHEMO IV INFUSION 1 HR: CPT | Mod: PN

## 2020-10-20 PROCEDURE — 96377 APPLICATON ON-BODY INJECTOR: CPT | Mod: PN,59

## 2020-10-20 PROCEDURE — 96417 CHEMO IV INFUS EACH ADDL SEQ: CPT | Mod: PN

## 2020-10-20 PROCEDURE — 96367 TX/PROPH/DG ADDL SEQ IV INF: CPT | Mod: PN

## 2020-10-20 PROCEDURE — 96375 TX/PRO/DX INJ NEW DRUG ADDON: CPT | Mod: PN

## 2020-10-20 RX ORDER — SODIUM CHLORIDE 0.9 % (FLUSH) 0.9 %
10 SYRINGE (ML) INJECTION
Status: DISCONTINUED | OUTPATIENT
Start: 2020-10-20 | End: 2020-10-20 | Stop reason: HOSPADM

## 2020-10-20 RX ORDER — EPINEPHRINE 0.3 MG/.3ML
0.3 INJECTION SUBCUTANEOUS ONCE AS NEEDED
Status: DISCONTINUED | OUTPATIENT
Start: 2020-10-20 | End: 2020-10-20 | Stop reason: HOSPADM

## 2020-10-20 RX ORDER — HEPARIN 100 UNIT/ML
500 SYRINGE INTRAVENOUS
Status: DISCONTINUED | OUTPATIENT
Start: 2020-10-20 | End: 2020-10-20 | Stop reason: HOSPADM

## 2020-10-20 RX ORDER — DIPHENHYDRAMINE HYDROCHLORIDE 50 MG/ML
50 INJECTION INTRAMUSCULAR; INTRAVENOUS ONCE AS NEEDED
Status: DISCONTINUED | OUTPATIENT
Start: 2020-10-20 | End: 2020-10-20 | Stop reason: HOSPADM

## 2020-10-20 RX ORDER — FAMOTIDINE 10 MG/ML
20 INJECTION INTRAVENOUS
Status: COMPLETED | OUTPATIENT
Start: 2020-10-20 | End: 2020-10-20

## 2020-10-20 RX ADMIN — Medication 10 ML: at 09:10

## 2020-10-20 RX ADMIN — DEXAMETHASONE SODIUM PHOSPHATE 10 MG: 4 INJECTION, SOLUTION INTRA-ARTICULAR; INTRALESIONAL; INTRAMUSCULAR; INTRAVENOUS; SOFT TISSUE at 10:10

## 2020-10-20 RX ADMIN — FAMOTIDINE 20 MG: 10 INJECTION INTRAVENOUS at 10:10

## 2020-10-20 RX ADMIN — PEGFILGRASTIM 6 MG: KIT SUBCUTANEOUS at 03:10

## 2020-10-20 RX ADMIN — PACLITAXEL 288 MG: 6 INJECTION, SOLUTION INTRAVENOUS at 11:10

## 2020-10-20 RX ADMIN — APREPITANT 130 MG: 130 INJECTION, EMULSION INTRAVENOUS at 10:10

## 2020-10-20 RX ADMIN — DIPHENHYDRAMINE HYDROCHLORIDE 50 MG: 50 INJECTION, SOLUTION INTRAMUSCULAR; INTRAVENOUS at 11:10

## 2020-10-20 RX ADMIN — SODIUM CHLORIDE 16 MG: 9 INJECTION, SOLUTION INTRAVENOUS at 11:10

## 2020-10-20 RX ADMIN — SODIUM CHLORIDE, PRESERVATIVE FREE 500 UNITS: 5 INJECTION INTRAVENOUS at 04:10

## 2020-10-20 RX ADMIN — CARBOPLATIN 440 MG: 10 INJECTION, SOLUTION INTRAVENOUS at 03:10

## 2020-10-20 NOTE — PLAN OF CARE
Tolerated treatment well, VSS, schedule reviewed with pt, Amanda OBI instructions reviewed with pt, pt verbalized understanding, ambulated from infusion center, no distress noted

## 2020-10-20 NOTE — PROGRESS NOTES
Oncology Nutrition   Chemotherapy Infusion Visit  Alexa Otero   1940    Nutrition Education   This is a 80 y.o.female with a medical diagnosis of carcinoma of right ovary.  Met w/ pt for first day of chemo infusion. Pt familiar with me from chemo school education. Pt reports continued good appetite. Pt does not identify as being at nutritional risk at this time. RD to continue to monitor.    RD to follow pt throughout treatment. Encouraged pt to contact me if she has any nutritional issues or challenges between treatments, pt v/u.    MEGHAN BISHOP MA, RD, LDN  10/20/2020  10:58 AM

## 2020-10-20 NOTE — PROGRESS NOTES
SW met with pt to complete initial assessment and screen for distress. Pt found in chair for chemo tx. Pt very pleasant and agreeable to SW visit and introduction. SW also introduced Lay Navigator, Roseanne. Pt denies any current problems or distress. Pt supported by her  and daughter (drove her here today). Pt denies any current needs. SW encouraged pt to reach out with any needs or emotional support. SW provided contact information. SW to follow.

## 2020-10-27 ENCOUNTER — LAB VISIT (OUTPATIENT)
Dept: LAB | Facility: HOSPITAL | Age: 80
End: 2020-10-27
Attending: OBSTETRICS & GYNECOLOGY
Payer: MEDICARE

## 2020-10-27 DIAGNOSIS — C56.9 MALIGNANT NEOPLASM OF OVARY, UNSPECIFIED LATERALITY: ICD-10-CM

## 2020-10-27 DIAGNOSIS — Z51.11 ENCOUNTER FOR ANTINEOPLASTIC CHEMOTHERAPY: ICD-10-CM

## 2020-10-27 LAB
BASOPHILS NFR BLD: 0 % (ref 0–1.9)
DIFFERENTIAL METHOD: ABNORMAL
EOSINOPHIL NFR BLD: 2 % (ref 0–8)
ERYTHROCYTE [DISTWIDTH] IN BLOOD BY AUTOMATED COUNT: 13.9 % (ref 11.5–14.5)
GIANT PLATELETS BLD QL SMEAR: PRESENT
HCT VFR BLD AUTO: 37 % (ref 37–48.5)
HGB BLD-MCNC: 11.8 G/DL (ref 12–16)
IMM GRANULOCYTES # BLD AUTO: ABNORMAL K/UL (ref 0–0.04)
IMM GRANULOCYTES NFR BLD AUTO: ABNORMAL % (ref 0–0.5)
LYMPHOCYTES NFR BLD: 13 % (ref 18–48)
MCH RBC QN AUTO: 28.4 PG (ref 27–31)
MCHC RBC AUTO-ENTMCNC: 31.9 G/DL (ref 32–36)
MCV RBC AUTO: 89 FL (ref 82–98)
MONOCYTES NFR BLD: 3 % (ref 4–15)
NEUTROPHILS NFR BLD: 68 % (ref 38–73)
NEUTS BAND NFR BLD MANUAL: 14 %
NRBC BLD-RTO: 0 /100 WBC
PLATELET # BLD AUTO: 213 K/UL (ref 150–350)
PLATELET BLD QL SMEAR: ABNORMAL
PMV BLD AUTO: 10.3 FL (ref 9.2–12.9)
RBC # BLD AUTO: 4.15 M/UL (ref 4–5.4)
WBC # BLD AUTO: 26.83 K/UL (ref 3.9–12.7)

## 2020-10-27 PROCEDURE — 80053 COMPREHEN METABOLIC PANEL: CPT

## 2020-10-27 PROCEDURE — 85007 BL SMEAR W/DIFF WBC COUNT: CPT | Mod: PO

## 2020-10-27 PROCEDURE — 85027 COMPLETE CBC AUTOMATED: CPT | Mod: PO

## 2020-10-27 PROCEDURE — 36415 COLL VENOUS BLD VENIPUNCTURE: CPT | Mod: PO

## 2020-10-27 PROCEDURE — 86304 IMMUNOASSAY TUMOR CA 125: CPT

## 2020-10-28 ENCOUNTER — LAB VISIT (OUTPATIENT)
Dept: LAB | Facility: HOSPITAL | Age: 80
End: 2020-10-28
Attending: OBSTETRICS & GYNECOLOGY
Payer: MEDICARE

## 2020-10-28 DIAGNOSIS — C56.9 MALIGNANT NEOPLASM OF OVARY: Primary | ICD-10-CM

## 2020-10-28 LAB
ALBUMIN SERPL BCP-MCNC: 3.9 G/DL (ref 3.5–5.2)
ALP SERPL-CCNC: 118 U/L (ref 55–135)
ALT SERPL W/O P-5'-P-CCNC: 32 U/L (ref 10–44)
ANION GAP SERPL CALC-SCNC: 16 MMOL/L (ref 8–16)
AST SERPL-CCNC: 34 U/L (ref 10–40)
BACTERIA #/AREA URNS AUTO: NORMAL /HPF
BILIRUB SERPL-MCNC: 0.2 MG/DL (ref 0.1–1)
BILIRUB UR QL STRIP: NEGATIVE
BUN SERPL-MCNC: 18 MG/DL (ref 8–23)
CALCIUM SERPL-MCNC: 9.7 MG/DL (ref 8.7–10.5)
CANCER AG125 SERPL-ACNC: 24 U/ML (ref 0–30)
CHLORIDE SERPL-SCNC: 100 MMOL/L (ref 95–110)
CLARITY UR REFRACT.AUTO: ABNORMAL
CO2 SERPL-SCNC: 25 MMOL/L (ref 23–29)
COLOR UR AUTO: YELLOW
CREAT SERPL-MCNC: 1 MG/DL (ref 0.5–1.4)
EST. GFR  (AFRICAN AMERICAN): >60 ML/MIN/1.73 M^2
EST. GFR  (NON AFRICAN AMERICAN): 53.7 ML/MIN/1.73 M^2
GLUCOSE SERPL-MCNC: 76 MG/DL (ref 70–110)
GLUCOSE UR QL STRIP: NEGATIVE
HGB UR QL STRIP: NEGATIVE
KETONES UR QL STRIP: NEGATIVE
LEUKOCYTE ESTERASE UR QL STRIP: NEGATIVE
MICROSCOPIC COMMENT: NORMAL
NITRITE UR QL STRIP: NEGATIVE
PH UR STRIP: 5 [PH] (ref 5–8)
POTASSIUM SERPL-SCNC: 3.8 MMOL/L (ref 3.5–5.1)
PROT SERPL-MCNC: 6.8 G/DL (ref 6–8.4)
PROT UR QL STRIP: NEGATIVE
RBC #/AREA URNS AUTO: 1 /HPF (ref 0–4)
SODIUM SERPL-SCNC: 141 MMOL/L (ref 136–145)
SP GR UR STRIP: 1.02 (ref 1–1.03)
SQUAMOUS #/AREA URNS AUTO: 14 /HPF
URN SPEC COLLECT METH UR: ABNORMAL
WBC #/AREA URNS AUTO: 3 /HPF (ref 0–5)

## 2020-10-28 PROCEDURE — 81001 URINALYSIS AUTO W/SCOPE: CPT

## 2020-11-02 ENCOUNTER — DOCUMENTATION ONLY (OUTPATIENT)
Dept: INFUSION THERAPY | Facility: HOSPITAL | Age: 80
End: 2020-11-02

## 2020-11-03 ENCOUNTER — LAB VISIT (OUTPATIENT)
Dept: LAB | Facility: HOSPITAL | Age: 80
End: 2020-11-03
Attending: FAMILY MEDICINE
Payer: MEDICARE

## 2020-11-03 ENCOUNTER — TELEPHONE (OUTPATIENT)
Dept: INFUSION THERAPY | Facility: HOSPITAL | Age: 80
End: 2020-11-03

## 2020-11-03 DIAGNOSIS — C56.9 MALIGNANT NEOPLASM OF OVARY, UNSPECIFIED LATERALITY: ICD-10-CM

## 2020-11-03 DIAGNOSIS — Z51.11 ENCOUNTER FOR ANTINEOPLASTIC CHEMOTHERAPY: ICD-10-CM

## 2020-11-03 LAB
BASOPHILS # BLD AUTO: 0.05 K/UL (ref 0–0.2)
BASOPHILS NFR BLD: 0.4 % (ref 0–1.9)
DIFFERENTIAL METHOD: ABNORMAL
EOSINOPHIL # BLD AUTO: 0 K/UL (ref 0–0.5)
EOSINOPHIL NFR BLD: 0.2 % (ref 0–8)
ERYTHROCYTE [DISTWIDTH] IN BLOOD BY AUTOMATED COUNT: 14.2 % (ref 11.5–14.5)
HCT VFR BLD AUTO: 36.9 % (ref 37–48.5)
HGB BLD-MCNC: 11.8 G/DL (ref 12–16)
IMM GRANULOCYTES # BLD AUTO: 0.08 K/UL (ref 0–0.04)
IMM GRANULOCYTES NFR BLD AUTO: 0.6 % (ref 0–0.5)
LYMPHOCYTES # BLD AUTO: 2.3 K/UL (ref 1–4.8)
LYMPHOCYTES NFR BLD: 18.8 % (ref 18–48)
MCH RBC QN AUTO: 28.4 PG (ref 27–31)
MCHC RBC AUTO-ENTMCNC: 32 G/DL (ref 32–36)
MCV RBC AUTO: 89 FL (ref 82–98)
MONOCYTES # BLD AUTO: 0.7 K/UL (ref 0.3–1)
MONOCYTES NFR BLD: 6 % (ref 4–15)
NEUTROPHILS # BLD AUTO: 9.2 K/UL (ref 1.8–7.7)
NEUTROPHILS NFR BLD: 74 % (ref 38–73)
NRBC BLD-RTO: 0 /100 WBC
PLATELET # BLD AUTO: 220 K/UL (ref 150–350)
PMV BLD AUTO: 9.4 FL (ref 9.2–12.9)
RBC # BLD AUTO: 4.16 M/UL (ref 4–5.4)
WBC # BLD AUTO: 12.38 K/UL (ref 3.9–12.7)

## 2020-11-03 PROCEDURE — 86304 IMMUNOASSAY TUMOR CA 125: CPT

## 2020-11-03 PROCEDURE — 85025 COMPLETE CBC W/AUTO DIFF WBC: CPT | Mod: PO

## 2020-11-03 PROCEDURE — 36415 COLL VENOUS BLD VENIPUNCTURE: CPT | Mod: PO

## 2020-11-03 PROCEDURE — 80053 COMPREHEN METABOLIC PANEL: CPT

## 2020-11-03 NOTE — TELEPHONE ENCOUNTER
----- Message from Le Forrester sent at 11/3/2020  9:23 AM CST -----  Regarding: FW: appt  Contact: pt    ----- Message -----  From: Serenity Ruiz  Sent: 11/3/2020   9:07 AM CST  To: [facility] Clinical Support  Subject: appt                                             Patient called and asked for a call back to get clarification on her lab and infusion appts  Pt states they should  be done in [facility] and the dates are incorrect.    Call back    [phone]

## 2020-11-03 NOTE — TELEPHONE ENCOUNTER
Spoke with patient she cancel her weekly labs here patient will get them done in [place] she will only get pre treatment labs here April in Dr Ray's office was notified

## 2020-11-04 LAB
ALBUMIN SERPL BCP-MCNC: 3.9 G/DL (ref 3.5–5.2)
ALP SERPL-CCNC: 83 U/L (ref 55–135)
ALT SERPL W/O P-5'-P-CCNC: 33 U/L (ref 10–44)
ANION GAP SERPL CALC-SCNC: 13 MMOL/L (ref 8–16)
AST SERPL-CCNC: 24 U/L (ref 10–40)
BILIRUB SERPL-MCNC: 0.3 MG/DL (ref 0.1–1)
BUN SERPL-MCNC: 22 MG/DL (ref 8–23)
CALCIUM SERPL-MCNC: 9.5 MG/DL (ref 8.7–10.5)
CANCER AG125 SERPL-ACNC: 23 U/ML (ref 0–30)
CHLORIDE SERPL-SCNC: 101 MMOL/L (ref 95–110)
CO2 SERPL-SCNC: 25 MMOL/L (ref 23–29)
CREAT SERPL-MCNC: 0.9 MG/DL (ref 0.5–1.4)
EST. GFR  (AFRICAN AMERICAN): >60 ML/MIN/1.73 M^2
EST. GFR  (NON AFRICAN AMERICAN): >60 ML/MIN/1.73 M^2
GLUCOSE SERPL-MCNC: 102 MG/DL (ref 70–110)
POTASSIUM SERPL-SCNC: 4.1 MMOL/L (ref 3.5–5.1)
PROT SERPL-MCNC: 7.2 G/DL (ref 6–8.4)
SODIUM SERPL-SCNC: 139 MMOL/L (ref 136–145)

## 2020-11-06 DIAGNOSIS — Z51.11 ENCOUNTER FOR ANTINEOPLASTIC CHEMOTHERAPY: ICD-10-CM

## 2020-11-06 DIAGNOSIS — C56.9 MALIGNANT NEOPLASM OF OVARY, UNSPECIFIED LATERALITY: Primary | ICD-10-CM

## 2020-11-10 ENCOUNTER — NUTRITION (OUTPATIENT)
Dept: INFUSION THERAPY | Facility: HOSPITAL | Age: 80
End: 2020-11-10
Attending: OBSTETRICS & GYNECOLOGY
Payer: MEDICARE

## 2020-11-10 ENCOUNTER — DOCUMENTATION ONLY (OUTPATIENT)
Dept: INFUSION THERAPY | Facility: HOSPITAL | Age: 80
End: 2020-11-10

## 2020-11-10 VITALS
SYSTOLIC BLOOD PRESSURE: 136 MMHG | WEIGHT: 144.63 LBS | BODY MASS INDEX: 25.62 KG/M2 | HEIGHT: 63 IN | HEART RATE: 106 BPM | TEMPERATURE: 98 F | DIASTOLIC BLOOD PRESSURE: 77 MMHG | RESPIRATION RATE: 18 BRPM

## 2020-11-10 DIAGNOSIS — C56.1 CARCINOMA OF RIGHT OVARY: Primary | ICD-10-CM

## 2020-11-10 PROCEDURE — 96415 CHEMO IV INFUSION ADDL HR: CPT | Mod: PN

## 2020-11-10 PROCEDURE — 96375 TX/PRO/DX INJ NEW DRUG ADDON: CPT | Mod: PN

## 2020-11-10 PROCEDURE — 25000003 PHARM REV CODE 250: Mod: PN | Performed by: OBSTETRICS & GYNECOLOGY

## 2020-11-10 PROCEDURE — 25000003 PHARM REV CODE 250: Mod: PN

## 2020-11-10 PROCEDURE — A4216 STERILE WATER/SALINE, 10 ML: HCPCS | Mod: PN | Performed by: OBSTETRICS & GYNECOLOGY

## 2020-11-10 PROCEDURE — 96413 CHEMO IV INFUSION 1 HR: CPT | Mod: PN

## 2020-11-10 PROCEDURE — 63600175 PHARM REV CODE 636 W HCPCS: Mod: JG,PN | Performed by: OBSTETRICS & GYNECOLOGY

## 2020-11-10 PROCEDURE — 96377 APPLICATON ON-BODY INJECTOR: CPT | Mod: PN

## 2020-11-10 PROCEDURE — 96417 CHEMO IV INFUS EACH ADDL SEQ: CPT | Mod: PN

## 2020-11-10 PROCEDURE — 96367 TX/PROPH/DG ADDL SEQ IV INF: CPT | Mod: PN

## 2020-11-10 RX ORDER — HEPARIN 100 UNIT/ML
500 SYRINGE INTRAVENOUS
Status: DISCONTINUED | OUTPATIENT
Start: 2020-11-10 | End: 2020-11-10 | Stop reason: HOSPADM

## 2020-11-10 RX ORDER — SODIUM CHLORIDE 0.9 % (FLUSH) 0.9 %
10 SYRINGE (ML) INJECTION
Status: DISCONTINUED | OUTPATIENT
Start: 2020-11-10 | End: 2020-11-10 | Stop reason: HOSPADM

## 2020-11-10 RX ORDER — EPINEPHRINE 0.3 MG/.3ML
0.3 INJECTION SUBCUTANEOUS ONCE AS NEEDED
Status: DISCONTINUED | OUTPATIENT
Start: 2020-11-10 | End: 2020-11-10 | Stop reason: HOSPADM

## 2020-11-10 RX ORDER — ACETAMINOPHEN 325 MG/1
650 TABLET ORAL
Status: COMPLETED | OUTPATIENT
Start: 2020-11-10 | End: 2020-11-10

## 2020-11-10 RX ORDER — FAMOTIDINE 10 MG/ML
20 INJECTION INTRAVENOUS
Status: COMPLETED | OUTPATIENT
Start: 2020-11-10 | End: 2020-11-10

## 2020-11-10 RX ORDER — DIPHENHYDRAMINE HYDROCHLORIDE 50 MG/ML
50 INJECTION INTRAMUSCULAR; INTRAVENOUS ONCE AS NEEDED
Status: DISCONTINUED | OUTPATIENT
Start: 2020-11-10 | End: 2020-11-10 | Stop reason: HOSPADM

## 2020-11-10 RX ADMIN — ACETAMINOPHEN 650 MG: 325 TABLET, FILM COATED ORAL at 11:11

## 2020-11-10 RX ADMIN — CARBOPLATIN 450 MG: 10 INJECTION, SOLUTION INTRAVENOUS at 01:11

## 2020-11-10 RX ADMIN — HEPARIN 500 UNITS: 100 SYRINGE at 02:11

## 2020-11-10 RX ADMIN — PEGFILGRASTIM 6 MG: KIT SUBCUTANEOUS at 02:11

## 2020-11-10 RX ADMIN — FAMOTIDINE 20 MG: 10 INJECTION INTRAVENOUS at 09:11

## 2020-11-10 RX ADMIN — SODIUM CHLORIDE 16 MG: 9 INJECTION, SOLUTION INTRAVENOUS at 10:11

## 2020-11-10 RX ADMIN — DEXAMETHASONE SODIUM PHOSPHATE 10 MG: 4 INJECTION, SOLUTION INTRA-ARTICULAR; INTRALESIONAL; INTRAMUSCULAR; INTRAVENOUS; SOFT TISSUE at 09:11

## 2020-11-10 RX ADMIN — PACLITAXEL 294 MG: 300 INJECTION, SOLUTION INTRAVENOUS at 10:11

## 2020-11-10 RX ADMIN — Medication 10 ML: at 09:11

## 2020-11-10 RX ADMIN — APREPITANT 130 MG: 130 INJECTION, EMULSION INTRAVENOUS at 09:11

## 2020-11-10 RX ADMIN — DIPHENHYDRAMINE HYDROCHLORIDE 50 MG: 50 INJECTION, SOLUTION INTRAMUSCULAR; INTRAVENOUS at 09:11

## 2020-11-10 NOTE — PROGRESS NOTES
Pt experiencing L hand pain, orders received to give pt 650 Tylenol PO per Марина NP for Dr. Ray.

## 2020-11-10 NOTE — PLAN OF CARE
Problem: Adult Inpatient Plan of Care  Goal: Plan of Care Review  Outcome: Ongoing, Progressing  Flowsheets (Taken 11/10/2020 1631)  Plan of Care Reviewed With: patient  Goal: Patient-Specific Goal (Individualization)  Outcome: Ongoing, Progressing       Pt tolerated tx well this shift. VSS. Neulasta OBI placed to left arm; functioning properly. Calendar printed and reviewed with pt. Pt is safe for discharge.

## 2020-11-10 NOTE — PROGRESS NOTES
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 PMHx reviewed and listed above. Met w/ pt today for follow up on nutritional status. Pt reports continued good appetite, weight remains stable thus far. Denies N/V - however, does report diarrhea a few days after last tx, that has just recently started to subside. Pt is afraid that it will begin again now that she is undergoing cycle two. Explained Enterade, pt willing - provided samples, brochure, coupon code and instructions. Explained probiotics, pt is willing - spoke w/ Dr. Flor Parish's NP who states she is OK with this. Provided pt with a few name brands that RD recommends. Encouraged pt to purchase probiotic to assist with GI distress.     All questions/concerns addressed at time of visit.     Weight: 144lbs  Weight at initiation of tx: 145lbs  Usual body weight: 145lbs  Weight Change: weight stable at this time    Nutrition focused physical findings: Well-nourished    Allergies: Patient has no known allergies.    Current Medications:    Current Outpatient Medications:     benazepriL (LOTENSIN) 40 MG tablet, Take 1 tablet (40 mg total) by mouth once daily. (Patient taking differently: Take 40 mg by mouth every evening. ), Disp: 90 tablet, Rfl: 3    famotidine (PEPCID) 40 MG tablet, Take 40 mg by  mouth once daily., Disp: , Rfl:     hydroCHLOROthiazide (HYDRODIURIL) 25 MG tablet, Take 1 tablet (25 mg total) by mouth once daily., Disp: 90 tablet, Rfl: 3    HYDROcodone-acetaminophen (NORCO) 5-325 mg per tablet, Take 1 tablet by mouth every 8 (eight) hours as needed for Pain., Disp: 12 tablet, Rfl: 0    metFORMIN (GLUCOPHAGE) 500 MG tablet, Take 1 tablet (500 mg total) by mouth 2 (two) times daily with meals., Disp: 180 tablet, Rfl: 3    omeprazole (PRILOSEC) 40 MG capsule, Take 1 capsule (40 mg total) by mouth once daily., Disp: 90 capsule, Rfl: 3    simvastatin (ZOCOR) 40 MG tablet, Take 1 tablet (40 mg total) by mouth every evening., Disp: 90 tablet, Rfl: 3    VIT A/VIT C/VIT E/ZINC/COPPER (ICAPS AREDS ORAL), Take 1 capsule by mouth 2 (two) times a day. , Disp: , Rfl:   No current facility-administered medications for this visit.     Facility-Administered Medications Ordered in Other Visits:     diphenhydrAMINE injection 50 mg, 50 mg, Intravenous, Once PRN, Altagracia Ray MD    EPINEPHrine (EPIPEN) 0.3 mg/0.3 mL pen injection 0.3 mg, 0.3 mg, Intramuscular, Once PRN, Altagracia Ray MD    heparin, porcine (PF) 100 unit/mL injection flush 500 Units, 500 Units, Intravenous, PRN, Altagracia Ray MD, 500 Units at 11/10/20 1450    hydrocortisone sodium succinate injection 100 mg, 100 mg, Intravenous, Once PRN, Altagracia Ray MD    sodium chloride 0.9% flush 10 mL, 10 mL, Intravenous, PRN, Altagracia Ray MD, 10 mL at 11/10/20 0932    Food/medication interactions noted: None noted at this time    Vitamins/Supplements: Reviewed - listed above, rec'd probiotic and Enterade at this visit    Labs: Reviewed    Nutrition Diagnosis    Problem: altered GI function  Etiology (related to): diarrhea  Signs/Symptoms (as evidenced by): patient reports    Nutrition Intervention    Nutrition Prescription   1950 Kcals (30kcal/kg)  98 g protein (1.5g/kg)   1950 mL fluid (1mL/kcal)    Recommendations:  1.  Continuation of current PO intake --> calories, protein and hydration  2. Enterade 1x daily in AM --> 30 mins prior to first meal  3. Probiotic  4. Weight maintenance     Materials Provided/Reviewed Enterade brochure and instructions, probiotics    Nutrition Monitoring and Evaluation    Monitor: energy intake, diet tolerance , weight and symptom management     Goals:   1. GI symptom management   2. Enterade compliance/tolerance  3. Weight maintenance     Motivation to change/anticipated barriers: Motivated    Follow up Next infusion--> Encouraged patient to call RD between infusions to discuss Enterade if it assists, and wants RD to order.    MEGHAN BISHOP MA, RD, LDN  11/11/2020  7:16 AM

## 2020-11-11 LAB
LEFT EYE DM RETINOPATHY: POSITIVE
RIGHT EYE DM RETINOPATHY: POSITIVE

## 2020-11-16 ENCOUNTER — PATIENT OUTREACH (OUTPATIENT)
Dept: ADMINISTRATIVE | Facility: HOSPITAL | Age: 80
End: 2020-11-16

## 2020-11-17 ENCOUNTER — LAB VISIT (OUTPATIENT)
Dept: LAB | Facility: HOSPITAL | Age: 80
End: 2020-11-17
Attending: FAMILY MEDICINE
Payer: MEDICARE

## 2020-11-17 DIAGNOSIS — C56.9 MALIGNANT NEOPLASM OF OVARY, UNSPECIFIED LATERALITY: ICD-10-CM

## 2020-11-17 DIAGNOSIS — Z51.11 ENCOUNTER FOR ANTINEOPLASTIC CHEMOTHERAPY: ICD-10-CM

## 2020-11-17 LAB
BASOPHILS # BLD AUTO: 0.03 K/UL (ref 0–0.2)
BASOPHILS # BLD AUTO: 0.05 K/UL (ref 0–0.2)
BASOPHILS NFR BLD: 0.3 % (ref 0–1.9)
BASOPHILS NFR BLD: 0.5 % (ref 0–1.9)
DIFFERENTIAL METHOD: ABNORMAL
DIFFERENTIAL METHOD: ABNORMAL
EOSINOPHIL # BLD AUTO: 0 K/UL (ref 0–0.5)
EOSINOPHIL # BLD AUTO: 0 K/UL (ref 0–0.5)
EOSINOPHIL NFR BLD: 0.3 % (ref 0–8)
EOSINOPHIL NFR BLD: 0.3 % (ref 0–8)
ERYTHROCYTE [DISTWIDTH] IN BLOOD BY AUTOMATED COUNT: 14.8 % (ref 11.5–14.5)
ERYTHROCYTE [DISTWIDTH] IN BLOOD BY AUTOMATED COUNT: 15.1 % (ref 11.5–14.5)
HCT VFR BLD AUTO: 33 % (ref 37–48.5)
HCT VFR BLD AUTO: 35 % (ref 37–48.5)
HGB BLD-MCNC: 10.7 G/DL (ref 12–16)
HGB BLD-MCNC: 10.9 G/DL (ref 12–16)
IMM GRANULOCYTES # BLD AUTO: 0.06 K/UL (ref 0–0.04)
IMM GRANULOCYTES # BLD AUTO: 0.07 K/UL (ref 0–0.04)
IMM GRANULOCYTES NFR BLD AUTO: 0.6 % (ref 0–0.5)
IMM GRANULOCYTES NFR BLD AUTO: 0.7 % (ref 0–0.5)
LYMPHOCYTES # BLD AUTO: 1.3 K/UL (ref 1–4.8)
LYMPHOCYTES # BLD AUTO: 1.3 K/UL (ref 1–4.8)
LYMPHOCYTES NFR BLD: 13.6 % (ref 18–48)
LYMPHOCYTES NFR BLD: 13.8 % (ref 18–48)
MCH RBC QN AUTO: 28.8 PG (ref 27–31)
MCH RBC QN AUTO: 29.4 PG (ref 27–31)
MCHC RBC AUTO-ENTMCNC: 30.6 G/DL (ref 32–36)
MCHC RBC AUTO-ENTMCNC: 33 G/DL (ref 32–36)
MCV RBC AUTO: 89 FL (ref 82–98)
MCV RBC AUTO: 94 FL (ref 82–98)
MONOCYTES # BLD AUTO: 0.2 K/UL (ref 0.3–1)
MONOCYTES # BLD AUTO: 0.3 K/UL (ref 0.3–1)
MONOCYTES NFR BLD: 2.1 % (ref 4–15)
MONOCYTES NFR BLD: 2.7 % (ref 4–15)
NEUTROPHILS # BLD AUTO: 7.8 K/UL (ref 1.8–7.7)
NEUTROPHILS # BLD AUTO: 7.9 K/UL (ref 1.8–7.7)
NEUTROPHILS NFR BLD: 82.2 % (ref 38–73)
NEUTROPHILS NFR BLD: 82.9 % (ref 38–73)
NRBC BLD-RTO: 0 /100 WBC
NRBC BLD-RTO: 0 /100 WBC
PLATELET # BLD AUTO: 138 K/UL (ref 150–350)
PLATELET # BLD AUTO: 138 K/UL (ref 150–350)
PMV BLD AUTO: 10 FL (ref 9.2–12.9)
PMV BLD AUTO: 10.9 FL (ref 9.2–12.9)
RBC # BLD AUTO: 3.71 M/UL (ref 4–5.4)
RBC # BLD AUTO: 3.72 M/UL (ref 4–5.4)
WBC # BLD AUTO: 9.5 K/UL (ref 3.9–12.7)
WBC # BLD AUTO: 9.51 K/UL (ref 3.9–12.7)

## 2020-11-17 PROCEDURE — 36415 COLL VENOUS BLD VENIPUNCTURE: CPT | Mod: PO

## 2020-11-17 PROCEDURE — 85025 COMPLETE CBC W/AUTO DIFF WBC: CPT | Mod: PO

## 2020-11-17 PROCEDURE — 85025 COMPLETE CBC W/AUTO DIFF WBC: CPT | Mod: 91

## 2020-11-20 DIAGNOSIS — E11.319 DIABETIC RETINOPATHY OF BOTH EYES ASSOCIATED WITH TYPE 2 DIABETES MELLITUS, MACULAR EDEMA PRESENCE UNSPECIFIED, UNSPECIFIED RETINOPATHY SEVERITY: Primary | ICD-10-CM

## 2020-11-24 ENCOUNTER — OFFICE VISIT (OUTPATIENT)
Dept: FAMILY MEDICINE | Facility: CLINIC | Age: 80
End: 2020-11-24
Payer: MEDICARE

## 2020-11-24 ENCOUNTER — LAB VISIT (OUTPATIENT)
Dept: LAB | Facility: HOSPITAL | Age: 80
End: 2020-11-24
Attending: OBSTETRICS & GYNECOLOGY
Payer: MEDICARE

## 2020-11-24 VITALS
HEIGHT: 63 IN | BODY MASS INDEX: 25.24 KG/M2 | RESPIRATION RATE: 12 BRPM | SYSTOLIC BLOOD PRESSURE: 120 MMHG | OXYGEN SATURATION: 96 % | WEIGHT: 142.44 LBS | DIASTOLIC BLOOD PRESSURE: 60 MMHG | HEART RATE: 94 BPM | TEMPERATURE: 97 F

## 2020-11-24 DIAGNOSIS — N95.9 MENOPAUSAL AND POSTMENOPAUSAL DISORDER: ICD-10-CM

## 2020-11-24 DIAGNOSIS — E11.9 CONTROLLED TYPE 2 DIABETES MELLITUS WITHOUT COMPLICATION, WITHOUT LONG-TERM CURRENT USE OF INSULIN: ICD-10-CM

## 2020-11-24 DIAGNOSIS — Z11.59 NEED FOR HEPATITIS C SCREENING TEST: ICD-10-CM

## 2020-11-24 DIAGNOSIS — C56.1 CARCINOMA OF RIGHT OVARY: ICD-10-CM

## 2020-11-24 DIAGNOSIS — Z51.11 ENCOUNTER FOR ANTINEOPLASTIC CHEMOTHERAPY: ICD-10-CM

## 2020-11-24 DIAGNOSIS — E78.5 HYPERLIPIDEMIA, UNSPECIFIED HYPERLIPIDEMIA TYPE: ICD-10-CM

## 2020-11-24 DIAGNOSIS — I10 ESSENTIAL HYPERTENSION: Primary | ICD-10-CM

## 2020-11-24 DIAGNOSIS — C56.9 MALIGNANT NEOPLASM OF OVARY: Primary | ICD-10-CM

## 2020-11-24 DIAGNOSIS — Z23 FLU VACCINE NEED: ICD-10-CM

## 2020-11-24 DIAGNOSIS — D75.1 POLYCYTHEMIA, SECONDARY: ICD-10-CM

## 2020-11-24 DIAGNOSIS — E55.9 VITAMIN D DEFICIENCY DISEASE: ICD-10-CM

## 2020-11-24 LAB
BASOPHILS # BLD AUTO: 0.02 K/UL (ref 0–0.2)
BASOPHILS NFR BLD: 0.4 % (ref 0–1.9)
DIFFERENTIAL METHOD: ABNORMAL
EOSINOPHIL # BLD AUTO: 0 K/UL (ref 0–0.5)
EOSINOPHIL NFR BLD: 0.6 % (ref 0–8)
ERYTHROCYTE [DISTWIDTH] IN BLOOD BY AUTOMATED COUNT: 15.8 % (ref 11.5–14.5)
HCT VFR BLD AUTO: 36.7 % (ref 37–48.5)
HGB BLD-MCNC: 11.3 G/DL (ref 12–16)
IMM GRANULOCYTES # BLD AUTO: 0.01 K/UL (ref 0–0.04)
IMM GRANULOCYTES NFR BLD AUTO: 0.2 % (ref 0–0.5)
LYMPHOCYTES # BLD AUTO: 2 K/UL (ref 1–4.8)
LYMPHOCYTES NFR BLD: 39 % (ref 18–48)
MCH RBC QN AUTO: 29 PG (ref 27–31)
MCHC RBC AUTO-ENTMCNC: 30.8 G/DL (ref 32–36)
MCV RBC AUTO: 94 FL (ref 82–98)
MONOCYTES # BLD AUTO: 0.5 K/UL (ref 0.3–1)
MONOCYTES NFR BLD: 9.6 % (ref 4–15)
NEUTROPHILS # BLD AUTO: 2.6 K/UL (ref 1.8–7.7)
NEUTROPHILS NFR BLD: 50.2 % (ref 38–73)
NRBC BLD-RTO: 0 /100 WBC
PLATELET # BLD AUTO: 163 K/UL (ref 150–350)
PMV BLD AUTO: 9.6 FL (ref 9.2–12.9)
RBC # BLD AUTO: 3.89 M/UL (ref 4–5.4)
WBC # BLD AUTO: 5.13 K/UL (ref 3.9–12.7)

## 2020-11-24 PROCEDURE — 1157F PR ADVANCE CARE PLAN OR EQUIV PRESENT IN MEDICAL RECORD: ICD-10-PCS | Mod: S$GLB,,, | Performed by: FAMILY MEDICINE

## 2020-11-24 PROCEDURE — 3074F SYST BP LT 130 MM HG: CPT | Mod: S$GLB,,, | Performed by: FAMILY MEDICINE

## 2020-11-24 PROCEDURE — 99214 PR OFFICE/OUTPT VISIT, EST, LEVL IV, 30-39 MIN: ICD-10-PCS | Mod: S$GLB,,, | Performed by: FAMILY MEDICINE

## 2020-11-24 PROCEDURE — 1126F AMNT PAIN NOTED NONE PRSNT: CPT | Mod: S$GLB,,, | Performed by: FAMILY MEDICINE

## 2020-11-24 PROCEDURE — 99999 PR PBB SHADOW E&M-EST. PATIENT-LVL IV: ICD-10-PCS | Mod: PBBFAC,,, | Performed by: FAMILY MEDICINE

## 2020-11-24 PROCEDURE — 99999 PR PBB SHADOW E&M-EST. PATIENT-LVL IV: CPT | Mod: PBBFAC,,, | Performed by: FAMILY MEDICINE

## 2020-11-24 PROCEDURE — 3074F PR MOST RECENT SYSTOLIC BLOOD PRESSURE < 130 MM HG: ICD-10-PCS | Mod: S$GLB,,, | Performed by: FAMILY MEDICINE

## 2020-11-24 PROCEDURE — 1159F MED LIST DOCD IN RCRD: CPT | Mod: S$GLB,,, | Performed by: FAMILY MEDICINE

## 2020-11-24 PROCEDURE — 99214 OFFICE O/P EST MOD 30 MIN: CPT | Mod: S$GLB,,, | Performed by: FAMILY MEDICINE

## 2020-11-24 PROCEDURE — 3288F PR FALLS RISK ASSESSMENT DOCUMENTED: ICD-10-PCS | Mod: S$GLB,,, | Performed by: FAMILY MEDICINE

## 2020-11-24 PROCEDURE — 36415 COLL VENOUS BLD VENIPUNCTURE: CPT | Mod: PO

## 2020-11-24 PROCEDURE — 1101F PT FALLS ASSESS-DOCD LE1/YR: CPT | Mod: S$GLB,,, | Performed by: FAMILY MEDICINE

## 2020-11-24 PROCEDURE — 3078F PR MOST RECENT DIASTOLIC BLOOD PRESSURE < 80 MM HG: ICD-10-PCS | Mod: S$GLB,,, | Performed by: FAMILY MEDICINE

## 2020-11-24 PROCEDURE — 1159F PR MEDICATION LIST DOCUMENTED IN MEDICAL RECORD: ICD-10-PCS | Mod: S$GLB,,, | Performed by: FAMILY MEDICINE

## 2020-11-24 PROCEDURE — 1101F PR PT FALLS ASSESS DOC 0-1 FALLS W/OUT INJ PAST YR: ICD-10-PCS | Mod: S$GLB,,, | Performed by: FAMILY MEDICINE

## 2020-11-24 PROCEDURE — 3288F FALL RISK ASSESSMENT DOCD: CPT | Mod: S$GLB,,, | Performed by: FAMILY MEDICINE

## 2020-11-24 PROCEDURE — 1126F PR PAIN SEVERITY QUANTIFIED, NO PAIN PRESENT: ICD-10-PCS | Mod: S$GLB,,, | Performed by: FAMILY MEDICINE

## 2020-11-24 PROCEDURE — 85025 COMPLETE CBC W/AUTO DIFF WBC: CPT

## 2020-11-24 PROCEDURE — 3078F DIAST BP <80 MM HG: CPT | Mod: S$GLB,,, | Performed by: FAMILY MEDICINE

## 2020-11-24 PROCEDURE — 1157F ADVNC CARE PLAN IN RCRD: CPT | Mod: S$GLB,,, | Performed by: FAMILY MEDICINE

## 2020-11-24 NOTE — PROGRESS NOTES
Subjective:       Patient ID: Alexa Otero is a 80 y.o. female.    Chief Complaint: Follow-up (6mth f/u hypertension)    HPI  Review of Systems   Constitutional: Negative for fatigue and unexpected weight change.   Respiratory: Negative for chest tightness and shortness of breath.    Cardiovascular: Negative for chest pain, palpitations and leg swelling.   Gastrointestinal: Negative for abdominal pain.   Musculoskeletal: Negative for arthralgias.   Neurological: Negative for dizziness, syncope, light-headedness and headaches.       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    On last visit she had c/o pressure when needs to urinate relieved with urination since 3/20. U/a and c/s unremarkable 4/20.  No bowel issues. Has lower abd pain sometimes sharp and intermittent which is persisting. Ct abd done 5/20 showing Sigmoid diverticulosis without evidence of diverticulitis.     Unusual appearing chronic pelvic mass which may relate to residual uterine 0 or ovarian tissue or postoperative seroma which does indent the dome of the bladder posteriorly.  This is partially visualized and of similar size to a 2016 MRI suggesting a benign etiology.   Unusual configuration to the pancreatic head with mild biliary ductal dilatation.  This is also stable in this patient status post  cholecystectomy.(had previous bout of pancreatitis)    Decreased hepatic cysts complex cystic lesion and secondary stable hepatic lesion.   Prominent spinal degenerative changes   No evidence of appendicitis or bowel obstruction.   Colonic diverticulosis and some degree of constipation are evident.     Referred to Dr. Ray due to pain in abd and abnl pelvic mass.  U/s showed solid right adnexal mass.  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  Had port acath placed 10/19/20 and is s/p 3 of 6 cycles of chemo . Tolerating well    Type 2 DM- a1c 6.7 9/20, urine micro neg, lipids at goal. On ACE I and zocor     Vit d low. Not on vit d supplement     Taking ortho for knee pain- Dr. Mojica started on mobic 3/10/20. ? CHERELLE  Objective:      Physical Exam  Vitals signs and nursing note reviewed.   Constitutional:       Appearance: She is well-developed.   Cardiovascular:      Rate and Rhythm: Normal rate and regular rhythm.      Heart sounds: Normal heart sounds.   Pulmonary:      Effort: Pulmonary effort is normal.      Breath sounds: Normal breath sounds.   Skin:     General: Skin is warm and dry.   Neurological:      Mental Status: She is alert and oriented to person, place, and time.         Assessment:       1. Essential hypertension    2. Hyperlipidemia, unspecified hyperlipidemia type    3. Polycythemia, secondary    4. Carcinoma of right ovary-Dr. Ray    5. Vitamin D deficiency disease    6. Controlled type 2 diabetes mellitus without complication, without long-term current use of insulin    7. Flu vaccine need    8. Menopausal and postmenopausal disorder    9. Need for hepatitis C screening test        Plan:         1. Essential hypertension  Controlled on current medications.  Continue current medications.      2. Hyperlipidemia, unspecified hyperlipidemia type  Controlled on current medications.  Continue current medications.    - Comprehensive Metabolic Panel; Future  - Lipid Panel;  Future    3. Polycythemia, secondary  Screen and treat as indicated:    - CBC Auto Differential; Future    4. Carcinoma of right ovary-Dr. Flor Parrish onc mgmt    5. Vitamin D deficiency disease  Stable condition.  Continue current medications.  Will adjust based on lab findings or if condition changes.    - Vitamin D; Future    6. Controlled type 2 diabetes mellitus without complication, without long-term current use of insulin  Stable condition.  Continue current medications.  Will adjust based on lab findings or if condition changes.    - Hemoglobin A1C; Future  - Microalbumin/Creatinine Ratio, Urine; Future    7. Flu vaccine need  declined    8. Menopausal and postmenopausal disorder  Screen and treat as indicated:    - DXA Bone Density Spine And Hip; Future    9. Need for hepatitis C screening test  Screen and treat as indicated:    - Hepatitis C Antibody; Future        Time spent with patient: 20 minutes    Patient with be reevaluated in 6 months or sooner prn    Greater than 50% of this visit was spent counseling as described in above documentation:Yes

## 2020-12-01 ENCOUNTER — LAB VISIT (OUTPATIENT)
Dept: LAB | Facility: HOSPITAL | Age: 80
End: 2020-12-01
Attending: OBSTETRICS & GYNECOLOGY
Payer: MEDICARE

## 2020-12-01 DIAGNOSIS — C56.9 MALIGNANT NEOPLASM OF OVARY, UNSPECIFIED LATERALITY: ICD-10-CM

## 2020-12-01 DIAGNOSIS — Z51.11 ENCOUNTER FOR ANTINEOPLASTIC CHEMOTHERAPY: ICD-10-CM

## 2020-12-01 LAB
ALBUMIN SERPL BCP-MCNC: 4.3 G/DL (ref 3.5–5.2)
ALP SERPL-CCNC: 49 U/L (ref 38–145)
ALT SERPL W/O P-5'-P-CCNC: 21 U/L (ref 0–35)
ANION GAP SERPL CALC-SCNC: 6 MMOL/L (ref 8–16)
AST SERPL-CCNC: 28 U/L (ref 14–36)
BASOPHILS # BLD AUTO: 0.02 K/UL (ref 0–0.2)
BASOPHILS NFR BLD: 0.3 % (ref 0–1.9)
BILIRUB SERPL-MCNC: 0.3 MG/DL (ref 0.2–1.3)
CALCIUM SERPL-MCNC: 9.8 MG/DL (ref 8.4–10.2)
CANCER AG125 SERPL-ACNC: 7 U/ML (ref 0–30)
CHLORIDE SERPL-SCNC: 103 MMOL/L (ref 95–110)
CO2 SERPL-SCNC: 32 MMOL/L (ref 22–31)
CREAT SERPL-MCNC: 1.03 MG/DL (ref 0.5–1.4)
DIFFERENTIAL METHOD: ABNORMAL
EOSINOPHIL # BLD AUTO: 0 K/UL (ref 0–0.5)
EOSINOPHIL NFR BLD: 0.5 % (ref 0–8)
ERYTHROCYTE [DISTWIDTH] IN BLOOD BY AUTOMATED COUNT: 16.4 % (ref 11.5–14.5)
EST. GFR  (AFRICAN AMERICAN): 60 ML/MIN/1.73 M^2
EST. GFR  (NON AFRICAN AMERICAN): 52 ML/MIN/1.73 M^2
GLUCOSE SERPL-MCNC: 114 MG/DL (ref 70–110)
HCT VFR BLD AUTO: 34.3 % (ref 37–48.5)
HGB BLD-MCNC: 10.9 G/DL (ref 12–16)
IMM GRANULOCYTES # BLD AUTO: 0.01 K/UL (ref 0–0.04)
IMM GRANULOCYTES NFR BLD AUTO: 0.2 % (ref 0–0.5)
LYMPHOCYTES # BLD AUTO: 1.5 K/UL (ref 1–4.8)
LYMPHOCYTES NFR BLD: 24.6 % (ref 18–48)
MCH RBC QN AUTO: 29.5 PG (ref 27–31)
MCHC RBC AUTO-ENTMCNC: 31.8 G/DL (ref 32–36)
MCV RBC AUTO: 93 FL (ref 82–98)
MONOCYTES # BLD AUTO: 0.5 K/UL (ref 0.3–1)
MONOCYTES NFR BLD: 8.9 % (ref 4–15)
NEUTROPHILS # BLD AUTO: 4 K/UL (ref 1.8–7.7)
NEUTROPHILS NFR BLD: 65.5 % (ref 38–73)
NRBC BLD-RTO: 0 /100 WBC
PLATELET # BLD AUTO: 198 K/UL (ref 150–350)
PMV BLD AUTO: 9 FL (ref 9.2–12.9)
POTASSIUM SERPL-SCNC: 4.1 MMOL/L (ref 3.5–5.1)
PROT SERPL-MCNC: 7.1 G/DL (ref 6–8.4)
RBC # BLD AUTO: 3.69 M/UL (ref 4–5.4)
SODIUM SERPL-SCNC: 141 MMOL/L (ref 136–145)
UUN UR-MCNC: 26 MG/DL (ref 7–18)
WBC # BLD AUTO: 6.1 K/UL (ref 3.9–12.7)

## 2020-12-01 PROCEDURE — 80053 COMPREHEN METABOLIC PANEL: CPT | Mod: PN

## 2020-12-01 PROCEDURE — 36415 COLL VENOUS BLD VENIPUNCTURE: CPT | Mod: PN

## 2020-12-01 PROCEDURE — 80053 COMPREHEN METABOLIC PANEL: CPT

## 2020-12-01 PROCEDURE — 85025 COMPLETE CBC W/AUTO DIFF WBC: CPT | Mod: PN

## 2020-12-01 PROCEDURE — 85025 COMPLETE CBC W/AUTO DIFF WBC: CPT

## 2020-12-01 PROCEDURE — 86304 IMMUNOASSAY TUMOR CA 125: CPT | Mod: PN

## 2020-12-01 PROCEDURE — 86304 IMMUNOASSAY TUMOR CA 125: CPT

## 2020-12-02 ENCOUNTER — NUTRITION (OUTPATIENT)
Dept: INFUSION THERAPY | Facility: HOSPITAL | Age: 80
End: 2020-12-02
Attending: OBSTETRICS & GYNECOLOGY
Payer: MEDICARE

## 2020-12-02 VITALS
RESPIRATION RATE: 16 BRPM | TEMPERATURE: 98 F | HEIGHT: 63 IN | WEIGHT: 144 LBS | HEART RATE: 101 BPM | BODY MASS INDEX: 25.52 KG/M2 | SYSTOLIC BLOOD PRESSURE: 141 MMHG | DIASTOLIC BLOOD PRESSURE: 81 MMHG

## 2020-12-02 DIAGNOSIS — C56.1 CARCINOMA OF RIGHT OVARY: Primary | ICD-10-CM

## 2020-12-02 PROCEDURE — 96375 TX/PRO/DX INJ NEW DRUG ADDON: CPT | Mod: PN

## 2020-12-02 PROCEDURE — 96415 CHEMO IV INFUSION ADDL HR: CPT | Mod: PN

## 2020-12-02 PROCEDURE — 96413 CHEMO IV INFUSION 1 HR: CPT | Mod: PN

## 2020-12-02 PROCEDURE — 63600175 PHARM REV CODE 636 W HCPCS: Mod: PN | Performed by: OBSTETRICS & GYNECOLOGY

## 2020-12-02 PROCEDURE — 25000003 PHARM REV CODE 250: Mod: PN | Performed by: OBSTETRICS & GYNECOLOGY

## 2020-12-02 PROCEDURE — 96417 CHEMO IV INFUS EACH ADDL SEQ: CPT | Mod: PN

## 2020-12-02 PROCEDURE — 96367 TX/PROPH/DG ADDL SEQ IV INF: CPT | Mod: PN

## 2020-12-02 PROCEDURE — 96377 APPLICATON ON-BODY INJECTOR: CPT | Mod: PN,59

## 2020-12-02 RX ORDER — EPINEPHRINE 0.3 MG/.3ML
0.3 INJECTION SUBCUTANEOUS ONCE AS NEEDED
Status: DISCONTINUED | OUTPATIENT
Start: 2020-12-02 | End: 2020-12-02 | Stop reason: HOSPADM

## 2020-12-02 RX ORDER — DIPHENHYDRAMINE HYDROCHLORIDE 50 MG/ML
50 INJECTION INTRAMUSCULAR; INTRAVENOUS ONCE AS NEEDED
Status: DISCONTINUED | OUTPATIENT
Start: 2020-12-02 | End: 2020-12-02 | Stop reason: HOSPADM

## 2020-12-02 RX ORDER — EPINEPHRINE 0.3 MG/.3ML
0.3 INJECTION SUBCUTANEOUS ONCE AS NEEDED
Status: CANCELLED | OUTPATIENT
Start: 2020-12-02

## 2020-12-02 RX ORDER — SODIUM CHLORIDE 0.9 % (FLUSH) 0.9 %
10 SYRINGE (ML) INJECTION
Status: CANCELLED | OUTPATIENT
Start: 2020-12-02

## 2020-12-02 RX ORDER — HEPARIN 100 UNIT/ML
500 SYRINGE INTRAVENOUS
Status: CANCELLED | OUTPATIENT
Start: 2020-12-02

## 2020-12-02 RX ORDER — HEPARIN 100 UNIT/ML
100 SYRINGE INTRAVENOUS
Status: DISCONTINUED | OUTPATIENT
Start: 2020-12-02 | End: 2020-12-02 | Stop reason: HOSPADM

## 2020-12-02 RX ORDER — DIPHENHYDRAMINE HYDROCHLORIDE 50 MG/ML
50 INJECTION INTRAMUSCULAR; INTRAVENOUS ONCE AS NEEDED
Status: CANCELLED | OUTPATIENT
Start: 2020-12-02

## 2020-12-02 RX ORDER — FAMOTIDINE 10 MG/ML
20 INJECTION INTRAVENOUS
Status: COMPLETED | OUTPATIENT
Start: 2020-12-02 | End: 2020-12-02

## 2020-12-02 RX ORDER — SODIUM CHLORIDE 0.9 % (FLUSH) 0.9 %
10 SYRINGE (ML) INJECTION
Status: DISCONTINUED | OUTPATIENT
Start: 2020-12-02 | End: 2020-12-02 | Stop reason: HOSPADM

## 2020-12-02 RX ORDER — FAMOTIDINE 10 MG/ML
20 INJECTION INTRAVENOUS
Status: CANCELLED | OUTPATIENT
Start: 2020-12-02

## 2020-12-02 RX ADMIN — PEGFILGRASTIM 6 MG: KIT SUBCUTANEOUS at 03:12

## 2020-12-02 RX ADMIN — DIPHENHYDRAMINE HYDROCHLORIDE 50 MG: 50 INJECTION, SOLUTION INTRAMUSCULAR; INTRAVENOUS at 10:12

## 2020-12-02 RX ADMIN — PACLITAXEL 294 MG: 6 INJECTION, SOLUTION INTRAVENOUS at 11:12

## 2020-12-02 RX ADMIN — SODIUM CHLORIDE 16 MG: 9 INJECTION, SOLUTION INTRAVENOUS at 10:12

## 2020-12-02 RX ADMIN — HEPARIN 100 UNITS: 100 SYRINGE at 03:12

## 2020-12-02 RX ADMIN — FAMOTIDINE 20 MG: 10 INJECTION INTRAVENOUS at 11:12

## 2020-12-02 RX ADMIN — APREPITANT 130 MG: 130 INJECTION, EMULSION INTRAVENOUS at 11:12

## 2020-12-02 RX ADMIN — DEXAMETHASONE SODIUM PHOSPHATE 10 MG: 4 INJECTION, SOLUTION INTRA-ARTICULAR; INTRALESIONAL; INTRAMUSCULAR; INTRAVENOUS; SOFT TISSUE at 10:12

## 2020-12-02 RX ADMIN — CARBOPLATIN 410 MG: 10 INJECTION, SOLUTION INTRAVENOUS at 02:12

## 2020-12-02 NOTE — PLAN OF CARE
Pt tolerated tx well today. OBI placed to left arm, verified blinking green before discharging pt. Vitals remain stable. Reviewed follow-up appointments. All questions were answered, ambulated independently at d/c.

## 2020-12-02 NOTE — PROGRESS NOTES
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 PMHx reviewed and listed above. Met w/ pt today for follow up on nutritional status. Pt reports continued good appetite, weight remains stable thus far. States she tried the Enterade supplement provided at last visit and she thinks it helped, pt does not want to purchase s/t price. Offered more samples, pt politely declined, reports her diarrhea is intermittent and not prolonged. Pt denies any other nutritional challenges at this time.     Inquired about probiotic purchase - pt stated she forgot to get one, but glad I reminded her and she will purchase this week. Re-explained importance and purpose, pt v/u.     All questions/concerns addressed at time of visit.     Weight: 144lbs  Weight at initiation of tx: 145lbs  Usual body weight: 144lbs  Weight Change: Weight stable    Nutrition focused physical findings: Well-nourished    Allergies: Patient has no known allergies.    Current Medications:    Current Outpatient Medications:     benazepriL (LOTENSIN) 40 MG tablet, Take 1 tablet (40 mg total) by mouth once daily. (Patient taking differently: Take 40 mg by mouth every evening. ), Disp: 90 tablet, Rfl: 3    famotidine (PEPCID) 40 MG tablet, Take 40 mg by mouth once daily., Disp: , Rfl:     hydroCHLOROthiazide  (HYDRODIURIL) 25 MG tablet, Take 1 tablet (25 mg total) by mouth once daily., Disp: 90 tablet, Rfl: 3    metFORMIN (GLUCOPHAGE) 500 MG tablet, Take 1 tablet (500 mg total) by mouth 2 (two) times daily with meals., Disp: 180 tablet, Rfl: 3    omeprazole (PRILOSEC) 40 MG capsule, Take 1 capsule (40 mg total) by mouth once daily., Disp: 90 capsule, Rfl: 3    simvastatin (ZOCOR) 40 MG tablet, Take 1 tablet (40 mg total) by mouth every evening., Disp: 90 tablet, Rfl: 3    VIT A/VIT C/VIT E/ZINC/COPPER (ICAPS AREDS ORAL), Take 1 capsule by mouth 2 (two) times a day. , Disp: , Rfl:   No current facility-administered medications for this visit.     Facility-Administered Medications Ordered in Other Visits:     aprepitant (CINVANTI) injection 130 mg, 130 mg, Intravenous, 1 time in Clinic/HOD, Altagracia Ray MD    CARBOplatin (PARAPLATIN) 410 mg in sodium chloride 0.9% 250 mL chemo infusion, 410 mg, Intravenous, 1 time in Clinic/HOD, Altagracia Ray MD    diphenhydrAMINE injection 50 mg, 50 mg, Intravenous, Once PRN, Altagracia Ray MD    EPINEPHrine (EPIPEN) 0.3 mg/0.3 mL pen injection 0.3 mg, 0.3 mg, Intramuscular, Once PRN, Altagracia Ray MD    famotidine (PF) injection 20 mg, 20 mg, Intravenous, 1 time in Clinic/HOD, Altagracia Ray MD    hydrocortisone sodium succinate injection 100 mg, 100 mg, Intravenous, Once PRN, Altagracia Ray MD    ondansetron (ZOFRAN) 16 mg in NS 50 mL IVPB, 16 mg, Intravenous, Once, Altagracia Ray MD    PACLitaxeL (TAXOL) 175 mg/m2 = 294 mg in sodium chloride 0.9% 500 mL chemo infusion, 175 mg/m2 (Order-Specific), Intravenous, 1 time in Clinic/HOD, Altagracia Ray MD    pegfilgrastim (NEULASTA (ON BODY INJECTOR)) injection 6 mg, 6 mg, Subcutaneous, Once, Altagracia Ray MD    sodium chloride 0.9% flush 10 mL, 10 mL, Intravenous, PRN, Altagracia Ray MD    Labs: Reviewed    Nutrition Diagnosis    Problem: altered GI function  Etiology (related to):  diarrhea  Signs/Symptoms (as evidenced by): report from patient    Nutrition Intervention    Nutrition Prescription   1950 Kcals (30kcal/kg)  98 g protein (1.5g/kg)   1950 mL fluid (1mL/kcal)    Recommendations:  1. Continuation of current PO intake --> calories, protein and fluids   2. Probiotic to assist w/ GI distress  3. Weight maintenance     Materials Provided/Reviewed Probiotic    Nutrition Monitoring and Evaluation    Monitor: energy intake, diet tolerance , weight and symptom management     Goals:   1. GI symptom management  2. Adequate nutritional intake to meet estimated needs    Motivation to change/anticipated barriers: Motivated    Follow up Next infusion     MEGHAN BISHOP MA, RD, LDN  12/02/2020  10:47 AM

## 2020-12-03 ENCOUNTER — PATIENT MESSAGE (OUTPATIENT)
Dept: FAMILY MEDICINE | Facility: CLINIC | Age: 80
End: 2020-12-03

## 2020-12-08 ENCOUNTER — LAB VISIT (OUTPATIENT)
Dept: LAB | Facility: HOSPITAL | Age: 80
End: 2020-12-08
Attending: OBSTETRICS & GYNECOLOGY
Payer: MEDICARE

## 2020-12-08 DIAGNOSIS — C56.9 MALIGNANT NEOPLASM OF OVARY, UNSPECIFIED LATERALITY: ICD-10-CM

## 2020-12-08 DIAGNOSIS — Z51.11 ENCOUNTER FOR ANTINEOPLASTIC CHEMOTHERAPY: ICD-10-CM

## 2020-12-08 LAB
ANISOCYTOSIS BLD QL SMEAR: SLIGHT
BASOPHILS # BLD AUTO: ABNORMAL K/UL (ref 0–0.2)
BASOPHILS NFR BLD: 0 % (ref 0–1.9)
DIFFERENTIAL METHOD: ABNORMAL
EOSINOPHIL # BLD AUTO: ABNORMAL K/UL (ref 0–0.5)
EOSINOPHIL NFR BLD: 1 % (ref 0–8)
ERYTHROCYTE [DISTWIDTH] IN BLOOD BY AUTOMATED COUNT: 17.2 % (ref 11.5–14.5)
HCT VFR BLD AUTO: 32.1 % (ref 37–48.5)
HGB BLD-MCNC: 10.7 G/DL (ref 12–16)
IMM GRANULOCYTES # BLD AUTO: ABNORMAL K/UL (ref 0–0.04)
IMM GRANULOCYTES NFR BLD AUTO: ABNORMAL % (ref 0–0.5)
LYMPHOCYTES # BLD AUTO: ABNORMAL K/UL (ref 1–4.8)
LYMPHOCYTES NFR BLD: 23 % (ref 18–48)
MCH RBC QN AUTO: 30.3 PG (ref 27–31)
MCHC RBC AUTO-ENTMCNC: 33.3 G/DL (ref 32–36)
MCV RBC AUTO: 91 FL (ref 82–98)
MONOCYTES # BLD AUTO: ABNORMAL K/UL (ref 0.3–1)
MONOCYTES NFR BLD: 14 % (ref 4–15)
NEUTROPHILS NFR BLD: 52 % (ref 38–73)
NEUTS BAND NFR BLD MANUAL: 10 %
NRBC BLD-RTO: 0 /100 WBC
PLATELET # BLD AUTO: 198 K/UL (ref 150–350)
PLATELET BLD QL SMEAR: ABNORMAL
PMV BLD AUTO: 9.6 FL (ref 9.2–12.9)
RBC # BLD AUTO: 3.53 M/UL (ref 4–5.4)
WBC # BLD AUTO: 13.07 K/UL (ref 3.9–12.7)

## 2020-12-08 PROCEDURE — 85007 BL SMEAR W/DIFF WBC COUNT: CPT | Mod: PO

## 2020-12-08 PROCEDURE — 85027 COMPLETE CBC AUTOMATED: CPT | Mod: PO

## 2020-12-08 PROCEDURE — 36415 COLL VENOUS BLD VENIPUNCTURE: CPT | Mod: PO

## 2020-12-08 PROCEDURE — 80053 COMPREHEN METABOLIC PANEL: CPT

## 2020-12-08 PROCEDURE — 86304 IMMUNOASSAY TUMOR CA 125: CPT

## 2020-12-09 LAB
ALBUMIN SERPL BCP-MCNC: 3.9 G/DL (ref 3.5–5.2)
ALP SERPL-CCNC: 97 U/L (ref 55–135)
ALT SERPL W/O P-5'-P-CCNC: 38 U/L (ref 10–44)
ANION GAP SERPL CALC-SCNC: 13 MMOL/L (ref 8–16)
AST SERPL-CCNC: 26 U/L (ref 10–40)
BILIRUB SERPL-MCNC: 0.2 MG/DL (ref 0.1–1)
BUN SERPL-MCNC: 27 MG/DL (ref 8–23)
CALCIUM SERPL-MCNC: 9.3 MG/DL (ref 8.7–10.5)
CANCER AG125 SERPL-ACNC: 75 U/ML (ref 0–30)
CHLORIDE SERPL-SCNC: 102 MMOL/L (ref 95–110)
CO2 SERPL-SCNC: 26 MMOL/L (ref 23–29)
CREAT SERPL-MCNC: 1 MG/DL (ref 0.5–1.4)
EST. GFR  (AFRICAN AMERICAN): >60 ML/MIN/1.73 M^2
EST. GFR  (NON AFRICAN AMERICAN): 53.7 ML/MIN/1.73 M^2
GLUCOSE SERPL-MCNC: 86 MG/DL (ref 70–110)
POTASSIUM SERPL-SCNC: 3.5 MMOL/L (ref 3.5–5.1)
PROT SERPL-MCNC: 7 G/DL (ref 6–8.4)
SODIUM SERPL-SCNC: 141 MMOL/L (ref 136–145)

## 2020-12-14 ENCOUNTER — PATIENT MESSAGE (OUTPATIENT)
Dept: FAMILY MEDICINE | Facility: CLINIC | Age: 80
End: 2020-12-14

## 2020-12-14 NOTE — TELEPHONE ENCOUNTER
I received a notification of what I thought was a diabetic camera screening showing aisha retinopathy which apparently was a  eye exam update and she already sees a retinal specialist.  I had the patient called to refer her to a retinal specialist. Can we find another way to document outside eye exams?

## 2020-12-15 ENCOUNTER — LAB VISIT (OUTPATIENT)
Dept: LAB | Facility: HOSPITAL | Age: 80
End: 2020-12-15
Attending: OBSTETRICS & GYNECOLOGY
Payer: MEDICARE

## 2020-12-15 DIAGNOSIS — C56.9 MALIGNANT NEOPLASM OF OVARY, UNSPECIFIED LATERALITY: ICD-10-CM

## 2020-12-15 DIAGNOSIS — Z51.11 ENCOUNTER FOR ANTINEOPLASTIC CHEMOTHERAPY: ICD-10-CM

## 2020-12-15 LAB
ALBUMIN SERPL BCP-MCNC: 3.6 G/DL (ref 3.5–5.2)
ALP SERPL-CCNC: 90 U/L (ref 55–135)
ALT SERPL W/O P-5'-P-CCNC: 34 U/L (ref 10–44)
ANION GAP SERPL CALC-SCNC: 13 MMOL/L (ref 8–16)
AST SERPL-CCNC: 26 U/L (ref 10–40)
BASOPHILS # BLD AUTO: 0.05 K/UL (ref 0–0.2)
BASOPHILS NFR BLD: 0.3 % (ref 0–1.9)
BILIRUB SERPL-MCNC: 0.2 MG/DL (ref 0.1–1)
BUN SERPL-MCNC: 25 MG/DL (ref 8–23)
CALCIUM SERPL-MCNC: 8.4 MG/DL (ref 8.7–10.5)
CANCER AG125 SERPL-ACNC: 13 U/ML (ref 0–30)
CHLORIDE SERPL-SCNC: 103 MMOL/L (ref 95–110)
CO2 SERPL-SCNC: 25 MMOL/L (ref 23–29)
CREAT SERPL-MCNC: 1 MG/DL (ref 0.5–1.4)
DIFFERENTIAL METHOD: ABNORMAL
EOSINOPHIL # BLD AUTO: 0 K/UL (ref 0–0.5)
EOSINOPHIL NFR BLD: 0.1 % (ref 0–8)
ERYTHROCYTE [DISTWIDTH] IN BLOOD BY AUTOMATED COUNT: 17.4 % (ref 11.5–14.5)
EST. GFR  (AFRICAN AMERICAN): >60 ML/MIN/1.73 M^2
EST. GFR  (NON AFRICAN AMERICAN): 53.7 ML/MIN/1.73 M^2
GLUCOSE SERPL-MCNC: 187 MG/DL (ref 70–110)
HCT VFR BLD AUTO: 31.6 % (ref 37–48.5)
HGB BLD-MCNC: 10.2 G/DL (ref 12–16)
IMM GRANULOCYTES # BLD AUTO: 0.4 K/UL (ref 0–0.04)
IMM GRANULOCYTES NFR BLD AUTO: 2.1 % (ref 0–0.5)
LYMPHOCYTES # BLD AUTO: 2.4 K/UL (ref 1–4.8)
LYMPHOCYTES NFR BLD: 12.5 % (ref 18–48)
MCH RBC QN AUTO: 29.6 PG (ref 27–31)
MCHC RBC AUTO-ENTMCNC: 32.3 G/DL (ref 32–36)
MCV RBC AUTO: 92 FL (ref 82–98)
MONOCYTES # BLD AUTO: 1.2 K/UL (ref 0.3–1)
MONOCYTES NFR BLD: 6.3 % (ref 4–15)
NEUTROPHILS # BLD AUTO: 14.9 K/UL (ref 1.8–7.7)
NEUTROPHILS NFR BLD: 78.7 % (ref 38–73)
NRBC BLD-RTO: 0 /100 WBC
PLATELET # BLD AUTO: 193 K/UL (ref 150–350)
PMV BLD AUTO: 9.1 FL (ref 9.2–12.9)
POTASSIUM SERPL-SCNC: 3.2 MMOL/L (ref 3.5–5.1)
PROT SERPL-MCNC: 6.8 G/DL (ref 6–8.4)
RBC # BLD AUTO: 3.45 M/UL (ref 4–5.4)
SODIUM SERPL-SCNC: 141 MMOL/L (ref 136–145)
WBC # BLD AUTO: 18.93 K/UL (ref 3.9–12.7)

## 2020-12-15 PROCEDURE — 80053 COMPREHEN METABOLIC PANEL: CPT

## 2020-12-15 PROCEDURE — 85025 COMPLETE CBC W/AUTO DIFF WBC: CPT | Mod: PO

## 2020-12-15 PROCEDURE — 86304 IMMUNOASSAY TUMOR CA 125: CPT

## 2020-12-15 PROCEDURE — 36415 COLL VENOUS BLD VENIPUNCTURE: CPT | Mod: PO

## 2020-12-17 ENCOUNTER — LAB VISIT (OUTPATIENT)
Dept: LAB | Facility: HOSPITAL | Age: 80
End: 2020-12-17
Attending: OBSTETRICS & GYNECOLOGY
Payer: MEDICARE

## 2020-12-17 DIAGNOSIS — C56.9 MALIGNANT NEOPLASM OF OVARY, UNSPECIFIED LATERALITY: ICD-10-CM

## 2020-12-17 DIAGNOSIS — Z51.11 ENCOUNTER FOR ANTINEOPLASTIC CHEMOTHERAPY: ICD-10-CM

## 2020-12-17 LAB
ALBUMIN SERPL BCP-MCNC: 4.3 G/DL (ref 3.5–5.2)
ALP SERPL-CCNC: 81 U/L (ref 38–145)
ALT SERPL W/O P-5'-P-CCNC: 35 U/L (ref 0–35)
ANION GAP SERPL CALC-SCNC: 8 MMOL/L (ref 8–16)
AST SERPL-CCNC: 32 U/L (ref 14–36)
BASOPHILS # BLD AUTO: 0.04 K/UL (ref 0–0.2)
BASOPHILS NFR BLD: 0.3 % (ref 0–1.9)
BILIRUB SERPL-MCNC: 0.3 MG/DL (ref 0.2–1.3)
CALCIUM SERPL-MCNC: 9.4 MG/DL (ref 8.4–10.2)
CANCER AG125 SERPL-ACNC: 9 U/ML (ref 0–30)
CHLORIDE SERPL-SCNC: 101 MMOL/L (ref 95–110)
CO2 SERPL-SCNC: 31 MMOL/L (ref 22–31)
CREAT SERPL-MCNC: 0.91 MG/DL (ref 0.5–1.4)
DIFFERENTIAL METHOD: ABNORMAL
EOSINOPHIL # BLD AUTO: 0 K/UL (ref 0–0.5)
EOSINOPHIL NFR BLD: 0.1 % (ref 0–8)
ERYTHROCYTE [DISTWIDTH] IN BLOOD BY AUTOMATED COUNT: 17.3 % (ref 11.5–14.5)
EST. GFR  (AFRICAN AMERICAN): >60 ML/MIN/1.73 M^2
EST. GFR  (NON AFRICAN AMERICAN): >60 ML/MIN/1.73 M^2
GLUCOSE SERPL-MCNC: 152 MG/DL (ref 70–110)
HCT VFR BLD AUTO: 33.1 % (ref 37–48.5)
HGB BLD-MCNC: 10.8 G/DL (ref 12–16)
IMM GRANULOCYTES # BLD AUTO: 0.11 K/UL (ref 0–0.04)
IMM GRANULOCYTES NFR BLD AUTO: 0.9 % (ref 0–0.5)
LYMPHOCYTES # BLD AUTO: 2.1 K/UL (ref 1–4.8)
LYMPHOCYTES NFR BLD: 17.3 % (ref 18–48)
MCH RBC QN AUTO: 30.5 PG (ref 27–31)
MCHC RBC AUTO-ENTMCNC: 32.6 G/DL (ref 32–36)
MCV RBC AUTO: 94 FL (ref 82–98)
MONOCYTES # BLD AUTO: 1 K/UL (ref 0.3–1)
MONOCYTES NFR BLD: 8.6 % (ref 4–15)
NEUTROPHILS # BLD AUTO: 8.7 K/UL (ref 1.8–7.7)
NEUTROPHILS NFR BLD: 72.8 % (ref 38–73)
NRBC BLD-RTO: 0 /100 WBC
PLATELET # BLD AUTO: 201 K/UL (ref 150–350)
PMV BLD AUTO: 9.2 FL (ref 9.2–12.9)
POTASSIUM SERPL-SCNC: 3.5 MMOL/L (ref 3.5–5.1)
PROT SERPL-MCNC: 7 G/DL (ref 6–8.4)
RBC # BLD AUTO: 3.54 M/UL (ref 4–5.4)
SODIUM SERPL-SCNC: 140 MMOL/L (ref 136–145)
UUN UR-MCNC: 19 MG/DL (ref 7–18)
WBC # BLD AUTO: 11.96 K/UL (ref 3.9–12.7)

## 2020-12-17 PROCEDURE — 80053 COMPREHEN METABOLIC PANEL: CPT

## 2020-12-17 PROCEDURE — 86304 IMMUNOASSAY TUMOR CA 125: CPT

## 2020-12-17 PROCEDURE — 36415 COLL VENOUS BLD VENIPUNCTURE: CPT | Mod: PN

## 2020-12-17 PROCEDURE — 86304 IMMUNOASSAY TUMOR CA 125: CPT | Mod: PN

## 2020-12-17 PROCEDURE — 85025 COMPLETE CBC W/AUTO DIFF WBC: CPT | Mod: PN

## 2020-12-17 PROCEDURE — 85025 COMPLETE CBC W/AUTO DIFF WBC: CPT

## 2020-12-17 PROCEDURE — 80053 COMPREHEN METABOLIC PANEL: CPT | Mod: PN

## 2020-12-22 ENCOUNTER — PATIENT MESSAGE (OUTPATIENT)
Dept: FAMILY MEDICINE | Facility: CLINIC | Age: 80
End: 2020-12-22

## 2020-12-23 ENCOUNTER — INFUSION (OUTPATIENT)
Dept: INFUSION THERAPY | Facility: HOSPITAL | Age: 80
End: 2020-12-23
Attending: OBSTETRICS & GYNECOLOGY
Payer: MEDICARE

## 2020-12-23 VITALS
WEIGHT: 142.44 LBS | DIASTOLIC BLOOD PRESSURE: 78 MMHG | SYSTOLIC BLOOD PRESSURE: 126 MMHG | HEART RATE: 109 BPM | TEMPERATURE: 98 F | BODY MASS INDEX: 25.24 KG/M2 | RESPIRATION RATE: 17 BRPM | HEIGHT: 63 IN

## 2020-12-23 DIAGNOSIS — C56.1 CARCINOMA OF RIGHT OVARY: Primary | ICD-10-CM

## 2020-12-23 PROCEDURE — 63600175 PHARM REV CODE 636 W HCPCS: Mod: JG,PN | Performed by: OBSTETRICS & GYNECOLOGY

## 2020-12-23 PROCEDURE — 96367 TX/PROPH/DG ADDL SEQ IV INF: CPT | Mod: PN

## 2020-12-23 PROCEDURE — A4216 STERILE WATER/SALINE, 10 ML: HCPCS | Mod: PN | Performed by: OBSTETRICS & GYNECOLOGY

## 2020-12-23 PROCEDURE — 96417 CHEMO IV INFUS EACH ADDL SEQ: CPT | Mod: PN

## 2020-12-23 PROCEDURE — 96413 CHEMO IV INFUSION 1 HR: CPT | Mod: PN

## 2020-12-23 PROCEDURE — 96415 CHEMO IV INFUSION ADDL HR: CPT | Mod: PN

## 2020-12-23 PROCEDURE — 96375 TX/PRO/DX INJ NEW DRUG ADDON: CPT | Mod: PN

## 2020-12-23 PROCEDURE — 96377 APPLICATON ON-BODY INJECTOR: CPT | Mod: PN

## 2020-12-23 PROCEDURE — 25000003 PHARM REV CODE 250: Mod: PN | Performed by: OBSTETRICS & GYNECOLOGY

## 2020-12-23 RX ORDER — DIPHENHYDRAMINE HYDROCHLORIDE 50 MG/ML
50 INJECTION INTRAMUSCULAR; INTRAVENOUS ONCE AS NEEDED
Status: DISCONTINUED | OUTPATIENT
Start: 2020-12-23 | End: 2020-12-23 | Stop reason: HOSPADM

## 2020-12-23 RX ORDER — FAMOTIDINE 10 MG/ML
20 INJECTION INTRAVENOUS
Status: COMPLETED | OUTPATIENT
Start: 2020-12-23 | End: 2020-12-23

## 2020-12-23 RX ORDER — EPINEPHRINE 0.3 MG/.3ML
0.3 INJECTION SUBCUTANEOUS ONCE AS NEEDED
Status: DISCONTINUED | OUTPATIENT
Start: 2020-12-23 | End: 2020-12-23 | Stop reason: HOSPADM

## 2020-12-23 RX ORDER — SODIUM CHLORIDE 0.9 % (FLUSH) 0.9 %
10 SYRINGE (ML) INJECTION
Status: DISCONTINUED | OUTPATIENT
Start: 2020-12-23 | End: 2020-12-23 | Stop reason: HOSPADM

## 2020-12-23 RX ORDER — HEPARIN 100 UNIT/ML
500 SYRINGE INTRAVENOUS
Status: DISCONTINUED | OUTPATIENT
Start: 2020-12-23 | End: 2020-12-23 | Stop reason: HOSPADM

## 2020-12-23 RX ADMIN — SODIUM CHLORIDE 16 MG: 9 INJECTION, SOLUTION INTRAVENOUS at 09:12

## 2020-12-23 RX ADMIN — CARBOPLATIN 440 MG: 10 INJECTION, SOLUTION INTRAVENOUS at 01:12

## 2020-12-23 RX ADMIN — APREPITANT 130 MG: 130 INJECTION, EMULSION INTRAVENOUS at 08:12

## 2020-12-23 RX ADMIN — PACLITAXEL 294 MG: 6 INJECTION, SOLUTION INTRAVENOUS at 10:12

## 2020-12-23 RX ADMIN — PEGFILGRASTIM 6 MG: KIT SUBCUTANEOUS at 02:12

## 2020-12-23 RX ADMIN — SODIUM CHLORIDE: 9 INJECTION, SOLUTION INTRAVENOUS at 08:12

## 2020-12-23 RX ADMIN — DIPHENHYDRAMINE HYDROCHLORIDE 50 MG: 50 INJECTION, SOLUTION INTRAMUSCULAR; INTRAVENOUS at 09:12

## 2020-12-23 RX ADMIN — Medication 10 ML: at 02:12

## 2020-12-23 RX ADMIN — DEXAMETHASONE SODIUM PHOSPHATE 10 MG: 4 INJECTION, SOLUTION INTRA-ARTICULAR; INTRALESIONAL; INTRAMUSCULAR; INTRAVENOUS; SOFT TISSUE at 08:12

## 2020-12-23 RX ADMIN — HEPARIN 500 UNITS: 100 SYRINGE at 02:12

## 2020-12-23 RX ADMIN — FAMOTIDINE 20 MG: 10 INJECTION INTRAVENOUS at 08:12

## 2020-12-23 NOTE — PLAN OF CARE
Problem: Adult Inpatient Plan of Care  Goal: Plan of Care Review  Outcome: Ongoing, Progressing  Flowsheets (Taken 12/23/2020 1515)  Plan of Care Reviewed With: patient     Pt tolerated tx well this shift. VSS. Neulasta obi in place and working properly. Calendar printed and reviewed with pt. Pt is safe for discharge.

## 2020-12-29 ENCOUNTER — LAB VISIT (OUTPATIENT)
Dept: LAB | Facility: HOSPITAL | Age: 80
End: 2020-12-29
Attending: OBSTETRICS & GYNECOLOGY
Payer: MEDICARE

## 2020-12-29 DIAGNOSIS — C56.9 MALIGNANT NEOPLASM OF OVARY, UNSPECIFIED LATERALITY: ICD-10-CM

## 2020-12-29 DIAGNOSIS — Z51.11 ENCOUNTER FOR ANTINEOPLASTIC CHEMOTHERAPY: ICD-10-CM

## 2020-12-29 LAB
ANISOCYTOSIS BLD QL SMEAR: SLIGHT
BASOPHILS # BLD AUTO: ABNORMAL K/UL (ref 0–0.2)
BASOPHILS NFR BLD: 0 % (ref 0–1.9)
DIFFERENTIAL METHOD: ABNORMAL
EOSINOPHIL # BLD AUTO: ABNORMAL K/UL (ref 0–0.5)
EOSINOPHIL NFR BLD: 0 % (ref 0–8)
ERYTHROCYTE [DISTWIDTH] IN BLOOD BY AUTOMATED COUNT: 18.9 % (ref 11.5–14.5)
HCT VFR BLD AUTO: 30.3 % (ref 37–48.5)
HGB BLD-MCNC: 10 G/DL (ref 12–16)
IMM GRANULOCYTES # BLD AUTO: ABNORMAL K/UL (ref 0–0.04)
IMM GRANULOCYTES NFR BLD AUTO: ABNORMAL % (ref 0–0.5)
LYMPHOCYTES # BLD AUTO: ABNORMAL K/UL (ref 1–4.8)
LYMPHOCYTES NFR BLD: 16 % (ref 18–48)
MCH RBC QN AUTO: 31 PG (ref 27–31)
MCHC RBC AUTO-ENTMCNC: 33 G/DL (ref 32–36)
MCV RBC AUTO: 94 FL (ref 82–98)
MONOCYTES # BLD AUTO: ABNORMAL K/UL (ref 0.3–1)
MONOCYTES NFR BLD: 5 % (ref 4–15)
NEUTROPHILS NFR BLD: 73 % (ref 38–73)
NEUTS BAND NFR BLD MANUAL: 6 %
NRBC BLD-RTO: 0 /100 WBC
PLATELET # BLD AUTO: 184 K/UL (ref 150–350)
PLATELET BLD QL SMEAR: ABNORMAL
PMV BLD AUTO: 9.7 FL (ref 9.2–12.9)
RBC # BLD AUTO: 3.23 M/UL (ref 4–5.4)
WBC # BLD AUTO: 28.02 K/UL (ref 3.9–12.7)

## 2020-12-29 PROCEDURE — 85007 BL SMEAR W/DIFF WBC COUNT: CPT | Mod: PO

## 2020-12-29 PROCEDURE — 85027 COMPLETE CBC AUTOMATED: CPT | Mod: PO

## 2020-12-29 PROCEDURE — 36415 COLL VENOUS BLD VENIPUNCTURE: CPT | Mod: PO

## 2021-01-05 ENCOUNTER — LAB VISIT (OUTPATIENT)
Dept: LAB | Facility: HOSPITAL | Age: 81
End: 2021-01-05
Attending: OBSTETRICS & GYNECOLOGY
Payer: MEDICARE

## 2021-01-05 DIAGNOSIS — Z51.11 ENCOUNTER FOR ANTINEOPLASTIC CHEMOTHERAPY: ICD-10-CM

## 2021-01-05 DIAGNOSIS — C56.9 MALIGNANT NEOPLASM OF OVARY, UNSPECIFIED LATERALITY: ICD-10-CM

## 2021-01-05 LAB
BASOPHILS # BLD AUTO: 0.04 K/UL (ref 0–0.2)
BASOPHILS NFR BLD: 0.2 % (ref 0–1.9)
DIFFERENTIAL METHOD: ABNORMAL
EOSINOPHIL # BLD AUTO: 0 K/UL (ref 0–0.5)
EOSINOPHIL NFR BLD: 0.1 % (ref 0–8)
ERYTHROCYTE [DISTWIDTH] IN BLOOD BY AUTOMATED COUNT: 18.7 % (ref 11.5–14.5)
HCT VFR BLD AUTO: 29.8 % (ref 37–48.5)
HGB BLD-MCNC: 9.8 G/DL (ref 12–16)
IMM GRANULOCYTES # BLD AUTO: 0.14 K/UL (ref 0–0.04)
IMM GRANULOCYTES NFR BLD AUTO: 0.8 % (ref 0–0.5)
LYMPHOCYTES # BLD AUTO: 1.8 K/UL (ref 1–4.8)
LYMPHOCYTES NFR BLD: 9.9 % (ref 18–48)
MCH RBC QN AUTO: 31.5 PG (ref 27–31)
MCHC RBC AUTO-ENTMCNC: 32.9 G/DL (ref 32–36)
MCV RBC AUTO: 96 FL (ref 82–98)
MONOCYTES # BLD AUTO: 0.8 K/UL (ref 0.3–1)
MONOCYTES NFR BLD: 4.5 % (ref 4–15)
NEUTROPHILS # BLD AUTO: 15 K/UL (ref 1.8–7.7)
NEUTROPHILS NFR BLD: 84.5 % (ref 38–73)
NRBC BLD-RTO: 0 /100 WBC
PLATELET # BLD AUTO: 148 K/UL (ref 150–350)
PMV BLD AUTO: 9.3 FL (ref 9.2–12.9)
RBC # BLD AUTO: 3.11 M/UL (ref 4–5.4)
WBC # BLD AUTO: 17.74 K/UL (ref 3.9–12.7)

## 2021-01-05 PROCEDURE — 85025 COMPLETE CBC W/AUTO DIFF WBC: CPT | Mod: PO

## 2021-01-05 PROCEDURE — 36415 COLL VENOUS BLD VENIPUNCTURE: CPT | Mod: PO

## 2021-01-06 ENCOUNTER — IMMUNIZATION (OUTPATIENT)
Dept: PRIMARY CARE CLINIC | Facility: CLINIC | Age: 81
End: 2021-01-06
Payer: MEDICARE

## 2021-01-06 DIAGNOSIS — Z23 NEED FOR VACCINATION: ICD-10-CM

## 2021-01-06 PROCEDURE — 0001A COVID-19, MRNA, LNP-S, PF, 30 MCG/0.3 ML DOSE VACCINE: ICD-10-PCS | Mod: S$GLB,,, | Performed by: FAMILY MEDICINE

## 2021-01-06 PROCEDURE — 91300 COVID-19, MRNA, LNP-S, PF, 30 MCG/0.3 ML DOSE VACCINE: ICD-10-PCS | Mod: S$GLB,,, | Performed by: FAMILY MEDICINE

## 2021-01-06 PROCEDURE — 91300 COVID-19, MRNA, LNP-S, PF, 30 MCG/0.3 ML DOSE VACCINE: CPT | Mod: S$GLB,,, | Performed by: FAMILY MEDICINE

## 2021-01-06 PROCEDURE — 0001A COVID-19, MRNA, LNP-S, PF, 30 MCG/0.3 ML DOSE VACCINE: CPT | Mod: S$GLB,,, | Performed by: FAMILY MEDICINE

## 2021-01-11 ENCOUNTER — LAB VISIT (OUTPATIENT)
Dept: LAB | Facility: HOSPITAL | Age: 81
End: 2021-01-11
Attending: OBSTETRICS & GYNECOLOGY
Payer: MEDICARE

## 2021-01-11 DIAGNOSIS — C56.9 MALIGNANT NEOPLASM OF OVARY, UNSPECIFIED LATERALITY: ICD-10-CM

## 2021-01-11 DIAGNOSIS — Z51.11 ENCOUNTER FOR ANTINEOPLASTIC CHEMOTHERAPY: ICD-10-CM

## 2021-01-11 LAB
ALBUMIN SERPL BCP-MCNC: 4.4 G/DL (ref 3.5–5.2)
ALP SERPL-CCNC: 64 U/L (ref 38–145)
ALT SERPL W/O P-5'-P-CCNC: 29 U/L (ref 0–35)
ANION GAP SERPL CALC-SCNC: 9 MMOL/L (ref 8–16)
AST SERPL-CCNC: 29 U/L (ref 14–36)
BASOPHILS # BLD AUTO: 0.02 K/UL (ref 0–0.2)
BASOPHILS NFR BLD: 0.3 % (ref 0–1.9)
BILIRUB SERPL-MCNC: 0.3 MG/DL (ref 0.2–1.3)
CALCIUM SERPL-MCNC: 9.7 MG/DL (ref 8.4–10.2)
CANCER AG125 SERPL-ACNC: 7 U/ML (ref 0–30)
CHLORIDE SERPL-SCNC: 101 MMOL/L (ref 95–110)
CO2 SERPL-SCNC: 30 MMOL/L (ref 22–31)
CREAT SERPL-MCNC: 1 MG/DL (ref 0.5–1.4)
DIFFERENTIAL METHOD: ABNORMAL
EOSINOPHIL # BLD AUTO: 0 K/UL (ref 0–0.5)
EOSINOPHIL NFR BLD: 0.1 % (ref 0–8)
ERYTHROCYTE [DISTWIDTH] IN BLOOD BY AUTOMATED COUNT: 18.8 % (ref 11.5–14.5)
EST. GFR  (AFRICAN AMERICAN): >60 ML/MIN/1.73 M^2
EST. GFR  (NON AFRICAN AMERICAN): 54 ML/MIN/1.73 M^2
GLUCOSE SERPL-MCNC: 132 MG/DL (ref 70–110)
HCT VFR BLD AUTO: 30.9 % (ref 37–48.5)
HGB BLD-MCNC: 10.1 G/DL (ref 12–16)
IMM GRANULOCYTES # BLD AUTO: 0.02 K/UL (ref 0–0.04)
IMM GRANULOCYTES NFR BLD AUTO: 0.3 % (ref 0–0.5)
LYMPHOCYTES # BLD AUTO: 1.3 K/UL (ref 1–4.8)
LYMPHOCYTES NFR BLD: 19.2 % (ref 18–48)
MCH RBC QN AUTO: 31.9 PG (ref 27–31)
MCHC RBC AUTO-ENTMCNC: 32.7 G/DL (ref 32–36)
MCV RBC AUTO: 98 FL (ref 82–98)
MONOCYTES # BLD AUTO: 0.5 K/UL (ref 0.3–1)
MONOCYTES NFR BLD: 7.2 % (ref 4–15)
NEUTROPHILS # BLD AUTO: 5 K/UL (ref 1.8–7.7)
NEUTROPHILS NFR BLD: 72.9 % (ref 38–73)
NRBC BLD-RTO: 0 /100 WBC
PLATELET # BLD AUTO: 104 K/UL (ref 150–350)
PMV BLD AUTO: 8.9 FL (ref 9.2–12.9)
POTASSIUM SERPL-SCNC: 3.4 MMOL/L (ref 3.5–5.1)
PROT SERPL-MCNC: 6.9 G/DL (ref 6–8.4)
RBC # BLD AUTO: 3.17 M/UL (ref 4–5.4)
SODIUM SERPL-SCNC: 140 MMOL/L (ref 136–145)
UUN UR-MCNC: 28 MG/DL (ref 7–18)
WBC # BLD AUTO: 6.81 K/UL (ref 3.9–12.7)

## 2021-01-11 PROCEDURE — 80053 COMPREHEN METABOLIC PANEL: CPT | Mod: PN

## 2021-01-11 PROCEDURE — 85025 COMPLETE CBC W/AUTO DIFF WBC: CPT

## 2021-01-11 PROCEDURE — 36415 COLL VENOUS BLD VENIPUNCTURE: CPT | Mod: PN

## 2021-01-11 PROCEDURE — 85025 COMPLETE CBC W/AUTO DIFF WBC: CPT | Mod: PN

## 2021-01-11 PROCEDURE — 80053 COMPREHEN METABOLIC PANEL: CPT

## 2021-01-11 PROCEDURE — 86304 IMMUNOASSAY TUMOR CA 125: CPT | Mod: PN

## 2021-01-11 PROCEDURE — 86304 IMMUNOASSAY TUMOR CA 125: CPT

## 2021-01-11 RX ORDER — EPINEPHRINE 0.3 MG/.3ML
0.3 INJECTION SUBCUTANEOUS ONCE AS NEEDED
Status: CANCELLED | OUTPATIENT
Start: 2021-01-21

## 2021-01-11 RX ORDER — DIPHENHYDRAMINE HYDROCHLORIDE 50 MG/ML
50 INJECTION INTRAMUSCULAR; INTRAVENOUS ONCE AS NEEDED
Status: CANCELLED | OUTPATIENT
Start: 2021-01-21

## 2021-01-11 RX ORDER — HEPARIN 100 UNIT/ML
500 SYRINGE INTRAVENOUS
Status: CANCELLED | OUTPATIENT
Start: 2021-01-21

## 2021-01-11 RX ORDER — FAMOTIDINE 10 MG/ML
20 INJECTION INTRAVENOUS
Status: CANCELLED | OUTPATIENT
Start: 2021-01-21

## 2021-01-11 RX ORDER — SODIUM CHLORIDE 0.9 % (FLUSH) 0.9 %
10 SYRINGE (ML) INJECTION
Status: CANCELLED | OUTPATIENT
Start: 2021-01-21

## 2021-01-12 ENCOUNTER — DOCUMENTATION ONLY (OUTPATIENT)
Dept: INFUSION THERAPY | Facility: HOSPITAL | Age: 81
End: 2021-01-12

## 2021-01-13 ENCOUNTER — DOCUMENTATION ONLY (OUTPATIENT)
Dept: INFUSION THERAPY | Facility: HOSPITAL | Age: 81
End: 2021-01-13

## 2021-01-13 ENCOUNTER — LAB VISIT (OUTPATIENT)
Dept: LAB | Facility: HOSPITAL | Age: 81
End: 2021-01-13
Attending: OBSTETRICS & GYNECOLOGY
Payer: MEDICARE

## 2021-01-13 DIAGNOSIS — C56.9 MALIGNANT NEOPLASM OF OVARY, UNSPECIFIED LATERALITY: ICD-10-CM

## 2021-01-13 DIAGNOSIS — Z51.11 ENCOUNTER FOR ANTINEOPLASTIC CHEMOTHERAPY: ICD-10-CM

## 2021-01-13 LAB
BASOPHILS # BLD AUTO: 0.03 K/UL (ref 0–0.2)
BASOPHILS NFR BLD: 0.3 % (ref 0–1.9)
DIFFERENTIAL METHOD: ABNORMAL
EOSINOPHIL # BLD AUTO: 0 K/UL (ref 0–0.5)
EOSINOPHIL NFR BLD: 0.1 % (ref 0–8)
ERYTHROCYTE [DISTWIDTH] IN BLOOD BY AUTOMATED COUNT: 18.6 % (ref 11.5–14.5)
HCT VFR BLD AUTO: 29.3 % (ref 37–48.5)
HGB BLD-MCNC: 9.7 G/DL (ref 12–16)
IMM GRANULOCYTES # BLD AUTO: 0.02 K/UL (ref 0–0.04)
IMM GRANULOCYTES NFR BLD AUTO: 0.2 % (ref 0–0.5)
LYMPHOCYTES # BLD AUTO: 1 K/UL (ref 1–4.8)
LYMPHOCYTES NFR BLD: 9.6 % (ref 18–48)
MCH RBC QN AUTO: 32 PG (ref 27–31)
MCHC RBC AUTO-ENTMCNC: 33.1 G/DL (ref 32–36)
MCV RBC AUTO: 97 FL (ref 82–98)
MONOCYTES # BLD AUTO: 0.9 K/UL (ref 0.3–1)
MONOCYTES NFR BLD: 8.6 % (ref 4–15)
NEUTROPHILS # BLD AUTO: 8.6 K/UL (ref 1.8–7.7)
NEUTROPHILS NFR BLD: 81.2 % (ref 38–73)
NRBC BLD-RTO: 0 /100 WBC
PLATELET # BLD AUTO: 98 K/UL (ref 150–350)
PMV BLD AUTO: 9.5 FL (ref 9.2–12.9)
RBC # BLD AUTO: 3.03 M/UL (ref 4–5.4)
WBC # BLD AUTO: 10.63 K/UL (ref 3.9–12.7)

## 2021-01-13 PROCEDURE — 85025 COMPLETE CBC W/AUTO DIFF WBC: CPT

## 2021-01-13 PROCEDURE — 36415 COLL VENOUS BLD VENIPUNCTURE: CPT | Mod: PN

## 2021-01-13 PROCEDURE — 85025 COMPLETE CBC W/AUTO DIFF WBC: CPT | Mod: PN

## 2021-01-15 ENCOUNTER — LAB VISIT (OUTPATIENT)
Dept: LAB | Facility: HOSPITAL | Age: 81
End: 2021-01-15
Attending: OBSTETRICS & GYNECOLOGY
Payer: MEDICARE

## 2021-01-15 ENCOUNTER — DOCUMENTATION ONLY (OUTPATIENT)
Dept: INFUSION THERAPY | Facility: HOSPITAL | Age: 81
End: 2021-01-15

## 2021-01-15 DIAGNOSIS — C56.9 MALIGNANT NEOPLASM OF OVARY, UNSPECIFIED LATERALITY: ICD-10-CM

## 2021-01-15 DIAGNOSIS — Z51.11 ENCOUNTER FOR ANTINEOPLASTIC CHEMOTHERAPY: ICD-10-CM

## 2021-01-15 LAB
BASOPHILS # BLD AUTO: 0.03 K/UL (ref 0–0.2)
BASOPHILS NFR BLD: 0.5 % (ref 0–1.9)
DIFFERENTIAL METHOD: ABNORMAL
EOSINOPHIL # BLD AUTO: 0 K/UL (ref 0–0.5)
EOSINOPHIL NFR BLD: 0.3 % (ref 0–8)
ERYTHROCYTE [DISTWIDTH] IN BLOOD BY AUTOMATED COUNT: 19.1 % (ref 11.5–14.5)
HCT VFR BLD AUTO: 28.7 % (ref 37–48.5)
HGB BLD-MCNC: 9.6 G/DL (ref 12–16)
IMM GRANULOCYTES # BLD AUTO: 0.03 K/UL (ref 0–0.04)
IMM GRANULOCYTES NFR BLD AUTO: 0.5 % (ref 0–0.5)
LYMPHOCYTES # BLD AUTO: 1.3 K/UL (ref 1–4.8)
LYMPHOCYTES NFR BLD: 19.4 % (ref 18–48)
MCH RBC QN AUTO: 32.5 PG (ref 27–31)
MCHC RBC AUTO-ENTMCNC: 33.4 G/DL (ref 32–36)
MCV RBC AUTO: 97 FL (ref 82–98)
MONOCYTES # BLD AUTO: 0.6 K/UL (ref 0.3–1)
MONOCYTES NFR BLD: 9.9 % (ref 4–15)
NEUTROPHILS # BLD AUTO: 4.5 K/UL (ref 1.8–7.7)
NEUTROPHILS NFR BLD: 69.4 % (ref 38–73)
NRBC BLD-RTO: 0 /100 WBC
PLATELET # BLD AUTO: 89 K/UL (ref 150–350)
PMV BLD AUTO: 9.3 FL (ref 9.2–12.9)
RBC # BLD AUTO: 2.95 M/UL (ref 4–5.4)
WBC # BLD AUTO: 6.48 K/UL (ref 3.9–12.7)

## 2021-01-15 PROCEDURE — 36415 COLL VENOUS BLD VENIPUNCTURE: CPT | Mod: PN

## 2021-01-15 PROCEDURE — 85025 COMPLETE CBC W/AUTO DIFF WBC: CPT | Mod: PN

## 2021-01-15 PROCEDURE — 85025 COMPLETE CBC W/AUTO DIFF WBC: CPT

## 2021-01-20 ENCOUNTER — LAB VISIT (OUTPATIENT)
Dept: LAB | Facility: HOSPITAL | Age: 81
End: 2021-01-20
Attending: OBSTETRICS & GYNECOLOGY
Payer: MEDICARE

## 2021-01-20 DIAGNOSIS — Z51.11 ENCOUNTER FOR ANTINEOPLASTIC CHEMOTHERAPY: ICD-10-CM

## 2021-01-20 DIAGNOSIS — C56.9 MALIGNANT NEOPLASM OF OVARY, UNSPECIFIED LATERALITY: ICD-10-CM

## 2021-01-20 LAB
BASOPHILS # BLD AUTO: 0.02 K/UL (ref 0–0.2)
BASOPHILS NFR BLD: 0.3 % (ref 0–1.9)
DIFFERENTIAL METHOD: ABNORMAL
EOSINOPHIL # BLD AUTO: 0.1 K/UL (ref 0–0.5)
EOSINOPHIL NFR BLD: 1.2 % (ref 0–8)
ERYTHROCYTE [DISTWIDTH] IN BLOOD BY AUTOMATED COUNT: 18.7 % (ref 11.5–14.5)
HCT VFR BLD AUTO: 29.8 % (ref 37–48.5)
HGB BLD-MCNC: 9.7 G/DL (ref 12–16)
IMM GRANULOCYTES # BLD AUTO: 0.02 K/UL (ref 0–0.04)
IMM GRANULOCYTES NFR BLD AUTO: 0.3 % (ref 0–0.5)
LYMPHOCYTES # BLD AUTO: 1.8 K/UL (ref 1–4.8)
LYMPHOCYTES NFR BLD: 23.9 % (ref 18–48)
MCH RBC QN AUTO: 32.6 PG (ref 27–31)
MCHC RBC AUTO-ENTMCNC: 32.6 G/DL (ref 32–36)
MCV RBC AUTO: 100 FL (ref 82–98)
MONOCYTES # BLD AUTO: 0.6 K/UL (ref 0.3–1)
MONOCYTES NFR BLD: 8.7 % (ref 4–15)
NEUTROPHILS # BLD AUTO: 4.8 K/UL (ref 1.8–7.7)
NEUTROPHILS NFR BLD: 65.6 % (ref 38–73)
NRBC BLD-RTO: 0 /100 WBC
PLATELET # BLD AUTO: 122 K/UL (ref 150–350)
PMV BLD AUTO: 9.5 FL (ref 9.2–12.9)
RBC # BLD AUTO: 2.98 M/UL (ref 4–5.4)
WBC # BLD AUTO: 7.35 K/UL (ref 3.9–12.7)

## 2021-01-20 PROCEDURE — 85025 COMPLETE CBC W/AUTO DIFF WBC: CPT | Mod: PO

## 2021-01-20 PROCEDURE — 36415 COLL VENOUS BLD VENIPUNCTURE: CPT | Mod: PO

## 2021-01-21 ENCOUNTER — INFUSION (OUTPATIENT)
Dept: INFUSION THERAPY | Facility: HOSPITAL | Age: 81
End: 2021-01-21
Attending: OBSTETRICS & GYNECOLOGY
Payer: MEDICARE

## 2021-01-21 VITALS
SYSTOLIC BLOOD PRESSURE: 132 MMHG | HEIGHT: 63 IN | DIASTOLIC BLOOD PRESSURE: 74 MMHG | TEMPERATURE: 98 F | BODY MASS INDEX: 24.8 KG/M2 | WEIGHT: 140 LBS | HEART RATE: 110 BPM

## 2021-01-21 DIAGNOSIS — C56.1 CARCINOMA OF RIGHT OVARY: Primary | ICD-10-CM

## 2021-01-21 PROCEDURE — 96375 TX/PRO/DX INJ NEW DRUG ADDON: CPT | Mod: PN

## 2021-01-21 PROCEDURE — 96377 APPLICATON ON-BODY INJECTOR: CPT | Mod: PN,59

## 2021-01-21 PROCEDURE — 96367 TX/PROPH/DG ADDL SEQ IV INF: CPT | Mod: PN

## 2021-01-21 PROCEDURE — 96417 CHEMO IV INFUS EACH ADDL SEQ: CPT | Mod: PN

## 2021-01-21 PROCEDURE — 63600175 PHARM REV CODE 636 W HCPCS: Mod: PN | Performed by: OBSTETRICS & GYNECOLOGY

## 2021-01-21 PROCEDURE — 96413 CHEMO IV INFUSION 1 HR: CPT | Mod: PN

## 2021-01-21 PROCEDURE — A4216 STERILE WATER/SALINE, 10 ML: HCPCS | Mod: PN | Performed by: OBSTETRICS & GYNECOLOGY

## 2021-01-21 PROCEDURE — 25000003 PHARM REV CODE 250: Mod: PN | Performed by: OBSTETRICS & GYNECOLOGY

## 2021-01-21 PROCEDURE — 96415 CHEMO IV INFUSION ADDL HR: CPT | Mod: PN

## 2021-01-21 RX ORDER — FAMOTIDINE 10 MG/ML
20 INJECTION INTRAVENOUS
Status: COMPLETED | OUTPATIENT
Start: 2021-01-21 | End: 2021-01-21

## 2021-01-21 RX ORDER — HEPARIN 100 UNIT/ML
500 SYRINGE INTRAVENOUS
Status: DISCONTINUED | OUTPATIENT
Start: 2021-01-21 | End: 2021-01-21 | Stop reason: HOSPADM

## 2021-01-21 RX ORDER — SODIUM CHLORIDE 0.9 % (FLUSH) 0.9 %
10 SYRINGE (ML) INJECTION
Status: DISCONTINUED | OUTPATIENT
Start: 2021-01-21 | End: 2021-01-21 | Stop reason: HOSPADM

## 2021-01-21 RX ADMIN — APREPITANT 130 MG: 130 INJECTION, EMULSION INTRAVENOUS at 12:01

## 2021-01-21 RX ADMIN — CARBOPLATIN 415 MG: 10 INJECTION, SOLUTION INTRAVENOUS at 03:01

## 2021-01-21 RX ADMIN — PEGFILGRASTIM 6 MG: KIT SUBCUTANEOUS at 04:01

## 2021-01-21 RX ADMIN — SODIUM CHLORIDE: 0.9 INJECTION, SOLUTION INTRAVENOUS at 10:01

## 2021-01-21 RX ADMIN — DEXAMETHASONE SODIUM PHOSPHATE 10 MG: 4 INJECTION, SOLUTION INTRA-ARTICULAR; INTRALESIONAL; INTRAMUSCULAR; INTRAVENOUS; SOFT TISSUE at 10:01

## 2021-01-21 RX ADMIN — FAMOTIDINE 20 MG: 10 INJECTION INTRAVENOUS at 12:01

## 2021-01-21 RX ADMIN — DIPHENHYDRAMINE HYDROCHLORIDE 50 MG: 50 INJECTION, SOLUTION INTRAMUSCULAR; INTRAVENOUS at 10:01

## 2021-01-21 RX ADMIN — PACLITAXEL 294 MG: 6 INJECTION, SOLUTION INTRAVENOUS at 12:01

## 2021-01-21 RX ADMIN — SODIUM CHLORIDE, PRESERVATIVE FREE 500 UNITS: 5 INJECTION INTRAVENOUS at 04:01

## 2021-01-21 RX ADMIN — Medication 10 ML: at 04:01

## 2021-01-21 RX ADMIN — SODIUM CHLORIDE 16 MG: 9 INJECTION, SOLUTION INTRAVENOUS at 11:01

## 2021-01-27 ENCOUNTER — LAB VISIT (OUTPATIENT)
Dept: LAB | Facility: HOSPITAL | Age: 81
End: 2021-01-27
Attending: OBSTETRICS & GYNECOLOGY
Payer: MEDICARE

## 2021-01-27 ENCOUNTER — IMMUNIZATION (OUTPATIENT)
Dept: PRIMARY CARE CLINIC | Facility: CLINIC | Age: 81
End: 2021-01-27
Payer: MEDICARE

## 2021-01-27 DIAGNOSIS — Z23 NEED FOR VACCINATION: Primary | ICD-10-CM

## 2021-01-27 DIAGNOSIS — C56.9 MALIGNANT NEOPLASM OF OVARY, UNSPECIFIED LATERALITY: ICD-10-CM

## 2021-01-27 DIAGNOSIS — Z51.11 ENCOUNTER FOR ANTINEOPLASTIC CHEMOTHERAPY: ICD-10-CM

## 2021-01-27 LAB
ANISOCYTOSIS BLD QL SMEAR: SLIGHT
BASOPHILS # BLD AUTO: 0.06 K/UL (ref 0–0.2)
BASOPHILS NFR BLD: 0.6 % (ref 0–1.9)
DIFFERENTIAL METHOD: ABNORMAL
EOSINOPHIL # BLD AUTO: 0.1 K/UL (ref 0–0.5)
EOSINOPHIL NFR BLD: 0.8 % (ref 0–8)
ERYTHROCYTE [DISTWIDTH] IN BLOOD BY AUTOMATED COUNT: 17.2 % (ref 11.5–14.5)
HCT VFR BLD AUTO: 30 % (ref 37–48.5)
HGB BLD-MCNC: 9.5 G/DL (ref 12–16)
IMM GRANULOCYTES # BLD AUTO: 0.08 K/UL (ref 0–0.04)
IMM GRANULOCYTES NFR BLD AUTO: 0.8 % (ref 0–0.5)
LYMPHOCYTES # BLD AUTO: 1.7 K/UL (ref 1–4.8)
LYMPHOCYTES NFR BLD: 16.2 % (ref 18–48)
MCH RBC QN AUTO: 32.6 PG (ref 27–31)
MCHC RBC AUTO-ENTMCNC: 31.7 G/DL (ref 32–36)
MCV RBC AUTO: 103 FL (ref 82–98)
MONOCYTES # BLD AUTO: 0.7 K/UL (ref 0.3–1)
MONOCYTES NFR BLD: 6.9 % (ref 4–15)
NEUTROPHILS # BLD AUTO: 7.7 K/UL (ref 1.8–7.7)
NEUTROPHILS NFR BLD: 74.7 % (ref 38–73)
NRBC BLD-RTO: 0 /100 WBC
OVALOCYTES BLD QL SMEAR: ABNORMAL
PLATELET # BLD AUTO: 70 K/UL (ref 150–350)
PLATELET BLD QL SMEAR: ABNORMAL
PMV BLD AUTO: 10.7 FL (ref 9.2–12.9)
POIKILOCYTOSIS BLD QL SMEAR: SLIGHT
RBC # BLD AUTO: 2.91 M/UL (ref 4–5.4)
WBC # BLD AUTO: 10.34 K/UL (ref 3.9–12.7)

## 2021-01-27 PROCEDURE — 36415 COLL VENOUS BLD VENIPUNCTURE: CPT | Mod: PO

## 2021-01-27 PROCEDURE — 0002A COVID-19, MRNA, LNP-S, PF, 30 MCG/0.3 ML DOSE VACCINE: CPT | Mod: S$GLB,,, | Performed by: FAMILY MEDICINE

## 2021-01-27 PROCEDURE — 91300 COVID-19, MRNA, LNP-S, PF, 30 MCG/0.3 ML DOSE VACCINE: ICD-10-PCS | Mod: S$GLB,,, | Performed by: FAMILY MEDICINE

## 2021-01-27 PROCEDURE — 85025 COMPLETE CBC W/AUTO DIFF WBC: CPT

## 2021-01-27 PROCEDURE — 91300 COVID-19, MRNA, LNP-S, PF, 30 MCG/0.3 ML DOSE VACCINE: CPT | Mod: S$GLB,,, | Performed by: FAMILY MEDICINE

## 2021-01-27 PROCEDURE — 0002A COVID-19, MRNA, LNP-S, PF, 30 MCG/0.3 ML DOSE VACCINE: ICD-10-PCS | Mod: S$GLB,,, | Performed by: FAMILY MEDICINE

## 2021-02-01 ENCOUNTER — PATIENT MESSAGE (OUTPATIENT)
Dept: FAMILY MEDICINE | Facility: CLINIC | Age: 81
End: 2021-02-01

## 2021-02-03 ENCOUNTER — LAB VISIT (OUTPATIENT)
Dept: LAB | Facility: HOSPITAL | Age: 81
End: 2021-02-03
Attending: OBSTETRICS & GYNECOLOGY
Payer: MEDICARE

## 2021-02-03 DIAGNOSIS — C56.9 MALIGNANT NEOPLASM OF OVARY, UNSPECIFIED LATERALITY: ICD-10-CM

## 2021-02-03 DIAGNOSIS — Z51.11 ENCOUNTER FOR ANTINEOPLASTIC CHEMOTHERAPY: ICD-10-CM

## 2021-02-03 LAB
BASOPHILS # BLD AUTO: 0.02 K/UL (ref 0–0.2)
BASOPHILS NFR BLD: 0.2 % (ref 0–1.9)
DIFFERENTIAL METHOD: ABNORMAL
EOSINOPHIL # BLD AUTO: 0 K/UL (ref 0–0.5)
EOSINOPHIL NFR BLD: 0.1 % (ref 0–8)
ERYTHROCYTE [DISTWIDTH] IN BLOOD BY AUTOMATED COUNT: 15.8 % (ref 11.5–14.5)
HCT VFR BLD AUTO: 27 % (ref 37–48.5)
HGB BLD-MCNC: 8.8 G/DL (ref 12–16)
IMM GRANULOCYTES # BLD AUTO: 0.1 K/UL (ref 0–0.04)
IMM GRANULOCYTES NFR BLD AUTO: 1.1 % (ref 0–0.5)
LYMPHOCYTES # BLD AUTO: 1.9 K/UL (ref 1–4.8)
LYMPHOCYTES NFR BLD: 20.7 % (ref 18–48)
MCH RBC QN AUTO: 33.7 PG (ref 27–31)
MCHC RBC AUTO-ENTMCNC: 32.6 G/DL (ref 32–36)
MCV RBC AUTO: 103 FL (ref 82–98)
MONOCYTES # BLD AUTO: 0.7 K/UL (ref 0.3–1)
MONOCYTES NFR BLD: 7.7 % (ref 4–15)
NEUTROPHILS # BLD AUTO: 6.4 K/UL (ref 1.8–7.7)
NEUTROPHILS NFR BLD: 70.2 % (ref 38–73)
NRBC BLD-RTO: 0 /100 WBC
PLATELET # BLD AUTO: 70 K/UL (ref 150–350)
PMV BLD AUTO: 9.8 FL (ref 9.2–12.9)
RBC # BLD AUTO: 2.61 M/UL (ref 4–5.4)
WBC # BLD AUTO: 9.1 K/UL (ref 3.9–12.7)

## 2021-02-03 PROCEDURE — 36415 COLL VENOUS BLD VENIPUNCTURE: CPT | Mod: PO

## 2021-02-03 PROCEDURE — 85025 COMPLETE CBC W/AUTO DIFF WBC: CPT | Mod: PO

## 2021-02-04 ENCOUNTER — OFFICE VISIT (OUTPATIENT)
Dept: FAMILY MEDICINE | Facility: CLINIC | Age: 81
End: 2021-02-04
Payer: MEDICARE

## 2021-02-04 ENCOUNTER — PATIENT MESSAGE (OUTPATIENT)
Dept: FAMILY MEDICINE | Facility: CLINIC | Age: 81
End: 2021-02-04

## 2021-02-04 ENCOUNTER — DOCUMENTATION ONLY (OUTPATIENT)
Dept: INFUSION THERAPY | Facility: HOSPITAL | Age: 81
End: 2021-02-04

## 2021-02-04 VITALS
HEART RATE: 105 BPM | TEMPERATURE: 97 F | WEIGHT: 141.75 LBS | HEIGHT: 63 IN | BODY MASS INDEX: 25.12 KG/M2 | OXYGEN SATURATION: 97 %

## 2021-02-04 DIAGNOSIS — E66.3 OVERWEIGHT (BMI 25.0-29.9): ICD-10-CM

## 2021-02-04 DIAGNOSIS — N76.0 ABSCESS, VAGINA: Primary | ICD-10-CM

## 2021-02-04 DIAGNOSIS — E11.59 HYPERTENSION ASSOCIATED WITH DIABETES: ICD-10-CM

## 2021-02-04 DIAGNOSIS — E11.9 CONTROLLED TYPE 2 DIABETES MELLITUS WITHOUT COMPLICATION, WITHOUT LONG-TERM CURRENT USE OF INSULIN: ICD-10-CM

## 2021-02-04 DIAGNOSIS — E78.5 HYPERLIPIDEMIA ASSOCIATED WITH TYPE 2 DIABETES MELLITUS: ICD-10-CM

## 2021-02-04 DIAGNOSIS — I15.2 HYPERTENSION ASSOCIATED WITH DIABETES: ICD-10-CM

## 2021-02-04 DIAGNOSIS — E11.69 HYPERLIPIDEMIA ASSOCIATED WITH TYPE 2 DIABETES MELLITUS: ICD-10-CM

## 2021-02-04 PROCEDURE — 1126F AMNT PAIN NOTED NONE PRSNT: CPT | Mod: S$GLB,,, | Performed by: NURSE PRACTITIONER

## 2021-02-04 PROCEDURE — 99999 PR PBB SHADOW E&M-EST. PATIENT-LVL III: ICD-10-PCS | Mod: PBBFAC,,, | Performed by: NURSE PRACTITIONER

## 2021-02-04 PROCEDURE — 99214 OFFICE O/P EST MOD 30 MIN: CPT | Mod: S$GLB,,, | Performed by: NURSE PRACTITIONER

## 2021-02-04 PROCEDURE — 99214 PR OFFICE/OUTPT VISIT, EST, LEVL IV, 30-39 MIN: ICD-10-PCS | Mod: S$GLB,,, | Performed by: NURSE PRACTITIONER

## 2021-02-04 PROCEDURE — 1101F PT FALLS ASSESS-DOCD LE1/YR: CPT | Mod: S$GLB,,, | Performed by: NURSE PRACTITIONER

## 2021-02-04 PROCEDURE — 1126F PR PAIN SEVERITY QUANTIFIED, NO PAIN PRESENT: ICD-10-PCS | Mod: S$GLB,,, | Performed by: NURSE PRACTITIONER

## 2021-02-04 PROCEDURE — 3288F FALL RISK ASSESSMENT DOCD: CPT | Mod: S$GLB,,, | Performed by: NURSE PRACTITIONER

## 2021-02-04 PROCEDURE — 99999 PR PBB SHADOW E&M-EST. PATIENT-LVL III: CPT | Mod: PBBFAC,,, | Performed by: NURSE PRACTITIONER

## 2021-02-04 PROCEDURE — 1159F PR MEDICATION LIST DOCUMENTED IN MEDICAL RECORD: ICD-10-PCS | Mod: S$GLB,,, | Performed by: NURSE PRACTITIONER

## 2021-02-04 PROCEDURE — 1157F ADVNC CARE PLAN IN RCRD: CPT | Mod: S$GLB,,, | Performed by: NURSE PRACTITIONER

## 2021-02-04 PROCEDURE — 1101F PR PT FALLS ASSESS DOC 0-1 FALLS W/OUT INJ PAST YR: ICD-10-PCS | Mod: S$GLB,,, | Performed by: NURSE PRACTITIONER

## 2021-02-04 PROCEDURE — 1157F PR ADVANCE CARE PLAN OR EQUIV PRESENT IN MEDICAL RECORD: ICD-10-PCS | Mod: S$GLB,,, | Performed by: NURSE PRACTITIONER

## 2021-02-04 PROCEDURE — 3288F PR FALLS RISK ASSESSMENT DOCUMENTED: ICD-10-PCS | Mod: S$GLB,,, | Performed by: NURSE PRACTITIONER

## 2021-02-04 PROCEDURE — 1159F MED LIST DOCD IN RCRD: CPT | Mod: S$GLB,,, | Performed by: NURSE PRACTITIONER

## 2021-02-04 RX ORDER — DOXYCYCLINE 100 MG/1
100 CAPSULE ORAL 2 TIMES DAILY
Qty: 14 CAPSULE | Refills: 0 | Status: SHIPPED | OUTPATIENT
Start: 2021-02-04 | End: 2021-02-11

## 2021-02-05 ENCOUNTER — LAB VISIT (OUTPATIENT)
Dept: LAB | Facility: HOSPITAL | Age: 81
End: 2021-02-05
Attending: OBSTETRICS & GYNECOLOGY
Payer: MEDICARE

## 2021-02-05 DIAGNOSIS — C56.9 MALIGNANT NEOPLASM OF OVARY, UNSPECIFIED LATERALITY: ICD-10-CM

## 2021-02-05 DIAGNOSIS — Z51.11 ENCOUNTER FOR ANTINEOPLASTIC CHEMOTHERAPY: ICD-10-CM

## 2021-02-05 LAB
BASOPHILS # BLD AUTO: 0.01 K/UL (ref 0–0.2)
BASOPHILS NFR BLD: 0.2 % (ref 0–1.9)
DIFFERENTIAL METHOD: ABNORMAL
EOSINOPHIL # BLD AUTO: 0 K/UL (ref 0–0.5)
EOSINOPHIL NFR BLD: 0.2 % (ref 0–8)
ERYTHROCYTE [DISTWIDTH] IN BLOOD BY AUTOMATED COUNT: 15.7 % (ref 11.5–14.5)
HCT VFR BLD AUTO: 25.2 % (ref 37–48.5)
HGB BLD-MCNC: 8.3 G/DL (ref 12–16)
IMM GRANULOCYTES # BLD AUTO: 0.03 K/UL (ref 0–0.04)
IMM GRANULOCYTES NFR BLD AUTO: 0.5 % (ref 0–0.5)
LYMPHOCYTES # BLD AUTO: 1.7 K/UL (ref 1–4.8)
LYMPHOCYTES NFR BLD: 28.4 % (ref 18–48)
MCH RBC QN AUTO: 33.9 PG (ref 27–31)
MCHC RBC AUTO-ENTMCNC: 32.9 G/DL (ref 32–36)
MCV RBC AUTO: 103 FL (ref 82–98)
MONOCYTES # BLD AUTO: 0.5 K/UL (ref 0.3–1)
MONOCYTES NFR BLD: 8.9 % (ref 4–15)
NEUTROPHILS # BLD AUTO: 3.8 K/UL (ref 1.8–7.7)
NEUTROPHILS NFR BLD: 61.8 % (ref 38–73)
NRBC BLD-RTO: 0 /100 WBC
PLATELET # BLD AUTO: 66 K/UL (ref 150–350)
PMV BLD AUTO: 9.8 FL (ref 9.2–12.9)
RBC # BLD AUTO: 2.45 M/UL (ref 4–5.4)
WBC # BLD AUTO: 6.06 K/UL (ref 3.9–12.7)

## 2021-02-05 PROCEDURE — 85025 COMPLETE CBC W/AUTO DIFF WBC: CPT | Mod: PO

## 2021-02-05 PROCEDURE — 36415 COLL VENOUS BLD VENIPUNCTURE: CPT | Mod: PO

## 2021-02-08 ENCOUNTER — LAB VISIT (OUTPATIENT)
Dept: LAB | Facility: HOSPITAL | Age: 81
End: 2021-02-08
Attending: OBSTETRICS & GYNECOLOGY
Payer: MEDICARE

## 2021-02-08 DIAGNOSIS — Z51.11 ENCOUNTER FOR ANTINEOPLASTIC CHEMOTHERAPY: ICD-10-CM

## 2021-02-08 DIAGNOSIS — C56.9 MALIGNANT NEOPLASM OF OVARY, UNSPECIFIED LATERALITY: ICD-10-CM

## 2021-02-08 PROCEDURE — 85025 COMPLETE CBC W/AUTO DIFF WBC: CPT

## 2021-02-08 PROCEDURE — 36415 COLL VENOUS BLD VENIPUNCTURE: CPT | Mod: PO

## 2021-02-09 LAB
BASOPHILS # BLD AUTO: 0.02 K/UL (ref 0–0.2)
BASOPHILS NFR BLD: 0.3 % (ref 0–1.9)
DIFFERENTIAL METHOD: ABNORMAL
EOSINOPHIL # BLD AUTO: 0 K/UL (ref 0–0.5)
EOSINOPHIL NFR BLD: 0 % (ref 0–8)
ERYTHROCYTE [DISTWIDTH] IN BLOOD BY AUTOMATED COUNT: 16.2 % (ref 11.5–14.5)
HCT VFR BLD AUTO: 24 % (ref 37–48.5)
HGB BLD-MCNC: 7.9 G/DL (ref 12–16)
IMM GRANULOCYTES # BLD AUTO: 0.04 K/UL (ref 0–0.04)
IMM GRANULOCYTES NFR BLD AUTO: 0.6 % (ref 0–0.5)
LYMPHOCYTES # BLD AUTO: 1.7 K/UL (ref 1–4.8)
LYMPHOCYTES NFR BLD: 23.1 % (ref 18–48)
MCH RBC QN AUTO: 33.9 PG (ref 27–31)
MCHC RBC AUTO-ENTMCNC: 32.9 G/DL (ref 32–36)
MCV RBC AUTO: 103 FL (ref 82–98)
MONOCYTES # BLD AUTO: 0.7 K/UL (ref 0.3–1)
MONOCYTES NFR BLD: 9.9 % (ref 4–15)
NEUTROPHILS # BLD AUTO: 4.8 K/UL (ref 1.8–7.7)
NEUTROPHILS NFR BLD: 66.1 % (ref 38–73)
NRBC BLD-RTO: 0 /100 WBC
PLATELET # BLD AUTO: 94 K/UL (ref 150–350)
PMV BLD AUTO: 9.6 FL (ref 9.2–12.9)
RBC # BLD AUTO: 2.33 M/UL (ref 4–5.4)
WBC # BLD AUTO: 7.24 K/UL (ref 3.9–12.7)

## 2021-02-10 ENCOUNTER — LAB VISIT (OUTPATIENT)
Dept: LAB | Facility: HOSPITAL | Age: 81
End: 2021-02-10
Attending: OBSTETRICS & GYNECOLOGY
Payer: MEDICARE

## 2021-02-10 DIAGNOSIS — C56.9 MALIGNANT NEOPLASM OF OVARY, UNSPECIFIED LATERALITY: ICD-10-CM

## 2021-02-10 DIAGNOSIS — Z51.11 ENCOUNTER FOR ANTINEOPLASTIC CHEMOTHERAPY: ICD-10-CM

## 2021-02-10 LAB
ALBUMIN SERPL BCP-MCNC: 4.3 G/DL (ref 3.5–5.2)
ALP SERPL-CCNC: 56 U/L (ref 38–145)
ALT SERPL W/O P-5'-P-CCNC: 24 U/L (ref 0–35)
ANION GAP SERPL CALC-SCNC: 12 MMOL/L (ref 8–16)
AST SERPL-CCNC: 37 U/L (ref 14–36)
BASOPHILS # BLD AUTO: 0.02 K/UL (ref 0–0.2)
BASOPHILS NFR BLD: 0.3 % (ref 0–1.9)
BILIRUB SERPL-MCNC: 0.5 MG/DL (ref 0.2–1.3)
CALCIUM SERPL-MCNC: 9.1 MG/DL (ref 8.4–10.2)
CANCER AG125 SERPL-ACNC: 7 U/ML (ref 0–30)
CHLORIDE SERPL-SCNC: 104 MMOL/L (ref 95–110)
CO2 SERPL-SCNC: 25 MMOL/L (ref 22–31)
CREAT SERPL-MCNC: 1.16 MG/DL (ref 0.5–1.4)
DIFFERENTIAL METHOD: ABNORMAL
EOSINOPHIL # BLD AUTO: 0 K/UL (ref 0–0.5)
EOSINOPHIL NFR BLD: 0 % (ref 0–8)
ERYTHROCYTE [DISTWIDTH] IN BLOOD BY AUTOMATED COUNT: 16.2 % (ref 11.5–14.5)
EST. GFR  (AFRICAN AMERICAN): 51 ML/MIN/1.73 M^2
EST. GFR  (NON AFRICAN AMERICAN): 45 ML/MIN/1.73 M^2
GLUCOSE SERPL-MCNC: 154 MG/DL (ref 70–110)
HCT VFR BLD AUTO: 25 % (ref 37–48.5)
HGB BLD-MCNC: 8 G/DL (ref 12–16)
IMM GRANULOCYTES # BLD AUTO: 0.03 K/UL (ref 0–0.04)
IMM GRANULOCYTES NFR BLD AUTO: 0.5 % (ref 0–0.5)
LYMPHOCYTES # BLD AUTO: 1.5 K/UL (ref 1–4.8)
LYMPHOCYTES NFR BLD: 24.7 % (ref 18–48)
MAGNESIUM SERPL-MCNC: 0.9 MG/DL (ref 1.6–2.6)
MCH RBC QN AUTO: 33.5 PG (ref 27–31)
MCHC RBC AUTO-ENTMCNC: 32 G/DL (ref 32–36)
MCV RBC AUTO: 105 FL (ref 82–98)
MONOCYTES # BLD AUTO: 0.5 K/UL (ref 0.3–1)
MONOCYTES NFR BLD: 8.6 % (ref 4–15)
NEUTROPHILS # BLD AUTO: 4 K/UL (ref 1.8–7.7)
NEUTROPHILS NFR BLD: 65.9 % (ref 38–73)
NRBC BLD-RTO: 0 /100 WBC
PLATELET # BLD AUTO: 118 K/UL (ref 150–350)
PMV BLD AUTO: 9.3 FL (ref 9.2–12.9)
POTASSIUM SERPL-SCNC: 3.8 MMOL/L (ref 3.5–5.1)
PROT SERPL-MCNC: 7 G/DL (ref 6–8.4)
RBC # BLD AUTO: 2.39 M/UL (ref 4–5.4)
SODIUM SERPL-SCNC: 141 MMOL/L (ref 136–145)
UUN UR-MCNC: 29 MG/DL (ref 7–18)
WBC # BLD AUTO: 6.07 K/UL (ref 3.9–12.7)

## 2021-02-10 PROCEDURE — 85025 COMPLETE CBC W/AUTO DIFF WBC: CPT | Mod: PN

## 2021-02-10 PROCEDURE — 80053 COMPREHEN METABOLIC PANEL: CPT | Mod: PN

## 2021-02-10 PROCEDURE — 83735 ASSAY OF MAGNESIUM: CPT | Mod: PN

## 2021-02-10 PROCEDURE — 85025 COMPLETE CBC W/AUTO DIFF WBC: CPT

## 2021-02-10 PROCEDURE — 86304 IMMUNOASSAY TUMOR CA 125: CPT

## 2021-02-10 PROCEDURE — 36415 COLL VENOUS BLD VENIPUNCTURE: CPT | Mod: PN

## 2021-02-10 PROCEDURE — 83735 ASSAY OF MAGNESIUM: CPT

## 2021-02-10 PROCEDURE — 80053 COMPREHEN METABOLIC PANEL: CPT

## 2021-02-10 PROCEDURE — 86304 IMMUNOASSAY TUMOR CA 125: CPT | Mod: PN

## 2021-02-11 ENCOUNTER — HOSPITAL ENCOUNTER (OUTPATIENT)
Dept: RADIOLOGY | Facility: CLINIC | Age: 81
Discharge: HOME | End: 2021-02-11
Attending: FAMILY MEDICINE
Payer: MEDICARE

## 2021-02-11 DIAGNOSIS — N95.9 MENOPAUSAL AND POSTMENOPAUSAL DISORDER: ICD-10-CM

## 2021-02-11 PROCEDURE — 77080 DXA BONE DENSITY AXIAL: CPT | Mod: TC,PO

## 2021-02-11 PROCEDURE — 77080 DXA BONE DENSITY AXIAL: CPT | Mod: 26,,, | Performed by: RADIOLOGY

## 2021-02-11 PROCEDURE — 77080 DEXA BONE DENSITY SPINE HIP: ICD-10-PCS | Mod: 26,,, | Performed by: RADIOLOGY

## 2021-02-11 RX ORDER — DIPHENHYDRAMINE HYDROCHLORIDE 50 MG/ML
50 INJECTION INTRAMUSCULAR; INTRAVENOUS ONCE AS NEEDED
Status: CANCELLED | OUTPATIENT
Start: 2021-02-12

## 2021-02-11 RX ORDER — EPINEPHRINE 0.3 MG/.3ML
0.3 INJECTION SUBCUTANEOUS ONCE AS NEEDED
Status: CANCELLED | OUTPATIENT
Start: 2021-02-12

## 2021-02-12 ENCOUNTER — INFUSION (OUTPATIENT)
Dept: INFUSION THERAPY | Facility: HOSPITAL | Age: 81
End: 2021-02-12
Attending: OBSTETRICS & GYNECOLOGY
Payer: MEDICARE

## 2021-02-12 VITALS
OXYGEN SATURATION: 99 % | BODY MASS INDEX: 25.12 KG/M2 | SYSTOLIC BLOOD PRESSURE: 135 MMHG | TEMPERATURE: 98 F | WEIGHT: 141.75 LBS | RESPIRATION RATE: 18 BRPM | HEIGHT: 63 IN | DIASTOLIC BLOOD PRESSURE: 65 MMHG | HEART RATE: 101 BPM

## 2021-02-12 DIAGNOSIS — C56.1 CARCINOMA OF RIGHT OVARY: Primary | ICD-10-CM

## 2021-02-12 PROCEDURE — 96415 CHEMO IV INFUSION ADDL HR: CPT | Mod: PN

## 2021-02-12 PROCEDURE — 63600175 PHARM REV CODE 636 W HCPCS: Mod: JG,PN | Performed by: OBSTETRICS & GYNECOLOGY

## 2021-02-12 PROCEDURE — 96367 TX/PROPH/DG ADDL SEQ IV INF: CPT | Mod: PN

## 2021-02-12 PROCEDURE — 96368 THER/DIAG CONCURRENT INF: CPT | Mod: PN

## 2021-02-12 PROCEDURE — 96413 CHEMO IV INFUSION 1 HR: CPT | Mod: PN

## 2021-02-12 PROCEDURE — 96377 APPLICATON ON-BODY INJECTOR: CPT | Mod: 59

## 2021-02-12 PROCEDURE — A4216 STERILE WATER/SALINE, 10 ML: HCPCS | Mod: PN | Performed by: OBSTETRICS & GYNECOLOGY

## 2021-02-12 PROCEDURE — 96417 CHEMO IV INFUS EACH ADDL SEQ: CPT | Mod: PN

## 2021-02-12 PROCEDURE — 25000003 PHARM REV CODE 250: Mod: PN | Performed by: OBSTETRICS & GYNECOLOGY

## 2021-02-12 PROCEDURE — 96375 TX/PRO/DX INJ NEW DRUG ADDON: CPT | Mod: PN

## 2021-02-12 RX ORDER — MAGNESIUM SULFATE HEPTAHYDRATE 40 MG/ML
2 INJECTION, SOLUTION INTRAVENOUS ONCE
Status: COMPLETED | OUTPATIENT
Start: 2021-02-12 | End: 2021-02-12

## 2021-02-12 RX ORDER — FAMOTIDINE 10 MG/ML
20 INJECTION INTRAVENOUS
Status: COMPLETED | OUTPATIENT
Start: 2021-02-12 | End: 2021-02-12

## 2021-02-12 RX ORDER — SODIUM CHLORIDE 0.9 % (FLUSH) 0.9 %
10 SYRINGE (ML) INJECTION
Status: DISCONTINUED | OUTPATIENT
Start: 2021-02-12 | End: 2021-02-12 | Stop reason: HOSPADM

## 2021-02-12 RX ORDER — HEPARIN 100 UNIT/ML
500 SYRINGE INTRAVENOUS
Status: DISCONTINUED | OUTPATIENT
Start: 2021-02-12 | End: 2021-02-12 | Stop reason: HOSPADM

## 2021-02-12 RX ADMIN — PACLITAXEL 294 MG: 6 INJECTION, SOLUTION INTRAVENOUS at 11:02

## 2021-02-12 RX ADMIN — DEXAMETHASONE SODIUM PHOSPHATE 20 MG: 4 INJECTION, SOLUTION INTRA-ARTICULAR; INTRALESIONAL; INTRAMUSCULAR; INTRAVENOUS; SOFT TISSUE at 10:02

## 2021-02-12 RX ADMIN — FAMOTIDINE 20 MG: 10 INJECTION INTRAVENOUS at 10:02

## 2021-02-12 RX ADMIN — Medication 10 ML: at 04:02

## 2021-02-12 RX ADMIN — HEPARIN 500 UNITS: 100 SYRINGE at 04:02

## 2021-02-12 RX ADMIN — APREPITANT 130 MG: 130 INJECTION, EMULSION INTRAVENOUS at 10:02

## 2021-02-12 RX ADMIN — PEGFILGRASTIM 6 MG: KIT SUBCUTANEOUS at 03:02

## 2021-02-12 RX ADMIN — CARBOPLATIN 375 MG: 10 INJECTION, SOLUTION INTRAVENOUS at 03:02

## 2021-02-12 RX ADMIN — DIPHENHYDRAMINE HYDROCHLORIDE 50 MG: 50 INJECTION INTRAMUSCULAR; INTRAVENOUS at 10:02

## 2021-02-12 RX ADMIN — ONDANSETRON 16 MG: 2 INJECTION INTRAMUSCULAR; INTRAVENOUS at 09:02

## 2021-02-12 RX ADMIN — MAGNESIUM SULFATE HEPTAHYDRATE 2 G: 40 INJECTION, SOLUTION INTRAVENOUS at 12:02

## 2021-02-12 RX ADMIN — SODIUM CHLORIDE: 9 INJECTION, SOLUTION INTRAVENOUS at 09:02

## 2021-02-17 ENCOUNTER — LAB VISIT (OUTPATIENT)
Dept: LAB | Facility: HOSPITAL | Age: 81
End: 2021-02-17
Attending: FAMILY MEDICINE
Payer: MEDICARE

## 2021-02-17 DIAGNOSIS — C56.9 MALIGNANT NEOPLASM OF OVARY: Primary | ICD-10-CM

## 2021-02-17 DIAGNOSIS — Z51.11 ENCOUNTER FOR ANTINEOPLASTIC CHEMOTHERAPY: ICD-10-CM

## 2021-02-17 DIAGNOSIS — C56.1 MALIGNANT NEOPLASM OF RIGHT OVARY: Primary | ICD-10-CM

## 2021-02-17 LAB
BASOPHILS NFR BLD: 0 % (ref 0–1.9)
DIFFERENTIAL METHOD: ABNORMAL
EOSINOPHIL NFR BLD: 0 % (ref 0–8)
ERYTHROCYTE [DISTWIDTH] IN BLOOD BY AUTOMATED COUNT: 16.4 % (ref 11.5–14.5)
HCT VFR BLD AUTO: 27.7 % (ref 37–48.5)
HGB BLD-MCNC: 8.9 G/DL (ref 12–16)
IMM GRANULOCYTES # BLD AUTO: ABNORMAL K/UL (ref 0–0.04)
IMM GRANULOCYTES NFR BLD AUTO: ABNORMAL % (ref 0–0.5)
LYMPHOCYTES NFR BLD: 11 % (ref 18–48)
MCH RBC QN AUTO: 34.4 PG (ref 27–31)
MCHC RBC AUTO-ENTMCNC: 32.1 G/DL (ref 32–36)
MCV RBC AUTO: 107 FL (ref 82–98)
MONOCYTES NFR BLD: 7 % (ref 4–15)
NEUTROPHILS NFR BLD: 78 % (ref 38–73)
NEUTS BAND NFR BLD MANUAL: 4 %
NRBC BLD-RTO: 0 /100 WBC
PLATELET # BLD AUTO: 246 K/UL (ref 150–350)
PLATELET BLD QL SMEAR: ABNORMAL
PMV BLD AUTO: 10.1 FL (ref 9.2–12.9)
RBC # BLD AUTO: 2.59 M/UL (ref 4–5.4)
WBC # BLD AUTO: 19.59 K/UL (ref 3.9–12.7)

## 2021-02-17 PROCEDURE — 85007 BL SMEAR W/DIFF WBC COUNT: CPT

## 2021-02-17 PROCEDURE — 85027 COMPLETE CBC AUTOMATED: CPT

## 2021-02-17 PROCEDURE — 36415 COLL VENOUS BLD VENIPUNCTURE: CPT | Mod: PO

## 2021-02-19 ENCOUNTER — HOSPITAL ENCOUNTER (OUTPATIENT)
Dept: RADIOLOGY | Facility: HOSPITAL | Age: 81
Discharge: HOME | End: 2021-02-19
Attending: OBSTETRICS & GYNECOLOGY
Payer: MEDICARE

## 2021-02-19 VITALS — WEIGHT: 140 LBS | HEIGHT: 63 IN | BODY MASS INDEX: 24.8 KG/M2

## 2021-02-19 DIAGNOSIS — C56.1 MALIGNANT NEOPLASM OF RIGHT OVARY: ICD-10-CM

## 2021-02-19 LAB — GLUCOSE SERPL-MCNC: 131 MG/DL (ref 70–110)

## 2021-02-19 PROCEDURE — 78815 PET IMAGE W/CT SKULL-THIGH: CPT | Mod: TC,PO

## 2021-02-24 ENCOUNTER — LAB VISIT (OUTPATIENT)
Dept: LAB | Facility: HOSPITAL | Age: 81
End: 2021-02-24
Attending: FAMILY MEDICINE
Payer: MEDICARE

## 2021-02-24 DIAGNOSIS — C56.9 MALIGNANT NEOPLASM OF OVARY: Primary | ICD-10-CM

## 2021-02-24 DIAGNOSIS — Z51.11 ENCOUNTER FOR ANTINEOPLASTIC CHEMOTHERAPY: ICD-10-CM

## 2021-02-24 PROCEDURE — 36415 COLL VENOUS BLD VENIPUNCTURE: CPT | Mod: PO

## 2021-02-24 PROCEDURE — 85025 COMPLETE CBC W/AUTO DIFF WBC: CPT

## 2021-02-25 LAB
BASOPHILS # BLD AUTO: 0.05 K/UL (ref 0–0.2)
BASOPHILS NFR BLD: 0.2 % (ref 0–1.9)
DIFFERENTIAL METHOD: ABNORMAL
EOSINOPHIL # BLD AUTO: 0 K/UL (ref 0–0.5)
EOSINOPHIL NFR BLD: 0 % (ref 0–8)
ERYTHROCYTE [DISTWIDTH] IN BLOOD BY AUTOMATED COUNT: 16.4 % (ref 11.5–14.5)
HCT VFR BLD AUTO: 28.4 % (ref 37–48.5)
HGB BLD-MCNC: 9 G/DL (ref 12–16)
IMM GRANULOCYTES # BLD AUTO: 0.23 K/UL (ref 0–0.04)
IMM GRANULOCYTES NFR BLD AUTO: 1.1 % (ref 0–0.5)
LYMPHOCYTES # BLD AUTO: 2.3 K/UL (ref 1–4.8)
LYMPHOCYTES NFR BLD: 11.1 % (ref 18–48)
MCH RBC QN AUTO: 34.9 PG (ref 27–31)
MCHC RBC AUTO-ENTMCNC: 31.7 G/DL (ref 32–36)
MCV RBC AUTO: 110 FL (ref 82–98)
MONOCYTES # BLD AUTO: 1.4 K/UL (ref 0.3–1)
MONOCYTES NFR BLD: 6.8 % (ref 4–15)
NEUTROPHILS # BLD AUTO: 16.8 K/UL (ref 1.8–7.7)
NEUTROPHILS NFR BLD: 80.8 % (ref 38–73)
NRBC BLD-RTO: 0 /100 WBC
PLATELET # BLD AUTO: 205 K/UL (ref 150–350)
PMV BLD AUTO: 9.9 FL (ref 9.2–12.9)
RBC # BLD AUTO: 2.58 M/UL (ref 4–5.4)
WBC # BLD AUTO: 20.74 K/UL (ref 3.9–12.7)

## 2021-02-26 ENCOUNTER — PATIENT MESSAGE (OUTPATIENT)
Dept: FAMILY MEDICINE | Facility: CLINIC | Age: 81
End: 2021-02-26

## 2021-02-26 DIAGNOSIS — Z12.31 ENCOUNTER FOR SCREENING MAMMOGRAM FOR MALIGNANT NEOPLASM OF BREAST: ICD-10-CM

## 2021-02-26 DIAGNOSIS — Z12.39 ENCOUNTER FOR SCREENING FOR MALIGNANT NEOPLASM OF BREAST, UNSPECIFIED SCREENING MODALITY: Primary | ICD-10-CM

## 2021-03-03 DIAGNOSIS — C56.1 MALIGNANT NEOPLASM OF RIGHT OVARY: Primary | ICD-10-CM

## 2021-03-16 ENCOUNTER — HOSPITAL ENCOUNTER (OUTPATIENT)
Dept: RADIOLOGY | Facility: HOSPITAL | Age: 81
Discharge: HOME | End: 2021-03-16
Attending: FAMILY MEDICINE
Payer: MEDICARE

## 2021-03-16 VITALS — HEIGHT: 63 IN | WEIGHT: 140 LBS | BODY MASS INDEX: 24.8 KG/M2

## 2021-03-16 DIAGNOSIS — Z12.31 ENCOUNTER FOR SCREENING MAMMOGRAM FOR MALIGNANT NEOPLASM OF BREAST: ICD-10-CM

## 2021-03-16 DIAGNOSIS — Z12.39 ENCOUNTER FOR SCREENING FOR MALIGNANT NEOPLASM OF BREAST, UNSPECIFIED SCREENING MODALITY: ICD-10-CM

## 2021-03-16 PROCEDURE — 77067 SCR MAMMO BI INCL CAD: CPT | Mod: TC,PO

## 2021-03-26 ENCOUNTER — INFUSION (OUTPATIENT)
Dept: INFUSION THERAPY | Facility: HOSPITAL | Age: 81
End: 2021-03-26
Attending: OBSTETRICS & GYNECOLOGY
Payer: MEDICARE

## 2021-03-26 DIAGNOSIS — C56.1 CARCINOMA OF RIGHT OVARY: Primary | ICD-10-CM

## 2021-03-26 PROCEDURE — 25000003 PHARM REV CODE 250: Mod: PN | Performed by: OBSTETRICS & GYNECOLOGY

## 2021-03-26 PROCEDURE — 96523 IRRIG DRUG DELIVERY DEVICE: CPT | Mod: PN

## 2021-03-26 PROCEDURE — A4216 STERILE WATER/SALINE, 10 ML: HCPCS | Mod: PN | Performed by: OBSTETRICS & GYNECOLOGY

## 2021-03-26 PROCEDURE — 63600175 PHARM REV CODE 636 W HCPCS: Mod: PN | Performed by: OBSTETRICS & GYNECOLOGY

## 2021-03-26 RX ORDER — SODIUM CHLORIDE 0.9 % (FLUSH) 0.9 %
10 SYRINGE (ML) INJECTION
Status: DISCONTINUED | OUTPATIENT
Start: 2021-03-26 | End: 2021-03-26 | Stop reason: HOSPADM

## 2021-03-26 RX ORDER — HEPARIN 100 UNIT/ML
500 SYRINGE INTRAVENOUS
Status: CANCELLED | OUTPATIENT
Start: 2021-03-26

## 2021-03-26 RX ORDER — SODIUM CHLORIDE 0.9 % (FLUSH) 0.9 %
10 SYRINGE (ML) INJECTION
Status: CANCELLED | OUTPATIENT
Start: 2021-03-26

## 2021-03-26 RX ORDER — HEPARIN 100 UNIT/ML
500 SYRINGE INTRAVENOUS
Status: DISCONTINUED | OUTPATIENT
Start: 2021-03-26 | End: 2021-03-26 | Stop reason: HOSPADM

## 2021-03-26 RX ADMIN — HEPARIN 500 UNITS: 100 SYRINGE at 11:03

## 2021-03-26 RX ADMIN — Medication 10 ML: at 10:03

## 2021-04-05 ENCOUNTER — HOSPITAL ENCOUNTER (OUTPATIENT)
Dept: RADIOLOGY | Facility: HOSPITAL | Age: 81
Discharge: HOME | End: 2021-04-05
Attending: OBSTETRICS & GYNECOLOGY
Payer: MEDICARE

## 2021-04-05 VITALS — BODY MASS INDEX: 24.8 KG/M2 | HEIGHT: 63 IN | WEIGHT: 140 LBS

## 2021-04-05 DIAGNOSIS — C56.1 MALIGNANT NEOPLASM OF RIGHT OVARY: ICD-10-CM

## 2021-04-05 LAB — GLUCOSE SERPL-MCNC: 115 MG/DL (ref 70–110)

## 2021-04-05 PROCEDURE — 78815 PET IMAGE W/CT SKULL-THIGH: CPT | Mod: TC,PO

## 2021-04-07 ENCOUNTER — LAB VISIT (OUTPATIENT)
Dept: LAB | Facility: HOSPITAL | Age: 81
End: 2021-04-07
Attending: OBSTETRICS & GYNECOLOGY
Payer: MEDICARE

## 2021-04-07 DIAGNOSIS — C56.1 MALIGNANT NEOPLASM OF RIGHT OVARY: Primary | ICD-10-CM

## 2021-04-07 DIAGNOSIS — C56.1 MALIGNANT NEOPLASM OF RIGHT OVARY: ICD-10-CM

## 2021-04-07 DIAGNOSIS — Z51.11 ENCOUNTER FOR ANTINEOPLASTIC CHEMOTHERAPY: ICD-10-CM

## 2021-04-07 DIAGNOSIS — C56.9 MALIGNANT NEOPLASM OF OVARY, UNSPECIFIED LATERALITY: ICD-10-CM

## 2021-04-07 LAB
ALBUMIN SERPL BCP-MCNC: 4.2 G/DL (ref 3.5–5.2)
ALP SERPL-CCNC: 61 U/L (ref 38–145)
ALT SERPL W/O P-5'-P-CCNC: 21 U/L (ref 0–35)
ANION GAP SERPL CALC-SCNC: 9 MMOL/L (ref 8–16)
AST SERPL-CCNC: 26 U/L (ref 14–36)
BASOPHILS # BLD AUTO: 0.04 K/UL (ref 0–0.2)
BASOPHILS NFR BLD: 0.7 % (ref 0–1.9)
BILIRUB SERPL-MCNC: 0.3 MG/DL (ref 0.2–1.3)
BILIRUB UR QL STRIP: NEGATIVE
CALCIUM SERPL-MCNC: 9.7 MG/DL (ref 8.4–10.2)
CANCER AG125 SERPL-ACNC: 6 U/ML (ref 0–30)
CHLORIDE SERPL-SCNC: 102 MMOL/L (ref 95–110)
CLARITY UR: CLEAR
CO2 SERPL-SCNC: 29 MMOL/L (ref 22–31)
COLOR UR: YELLOW
CREAT SERPL-MCNC: 1.07 MG/DL (ref 0.5–1.4)
DIFFERENTIAL METHOD: ABNORMAL
EOSINOPHIL # BLD AUTO: 0.2 K/UL (ref 0–0.5)
EOSINOPHIL NFR BLD: 2.4 % (ref 0–8)
ERYTHROCYTE [DISTWIDTH] IN BLOOD BY AUTOMATED COUNT: 13.1 % (ref 11.5–14.5)
EST. GFR  (AFRICAN AMERICAN): 57 ML/MIN/1.73 M^2
EST. GFR  (NON AFRICAN AMERICAN): 49 ML/MIN/1.73 M^2
GLUCOSE SERPL-MCNC: 115 MG/DL (ref 70–110)
GLUCOSE UR QL STRIP: NEGATIVE
HCT VFR BLD AUTO: 32.5 % (ref 37–48.5)
HGB BLD-MCNC: 10.4 G/DL (ref 12–16)
HGB UR QL STRIP: NEGATIVE
IMM GRANULOCYTES # BLD AUTO: 0.01 K/UL (ref 0–0.04)
IMM GRANULOCYTES NFR BLD AUTO: 0.2 % (ref 0–0.5)
KETONES UR QL STRIP: NEGATIVE
LEUKOCYTE ESTERASE UR QL STRIP: NEGATIVE
LYMPHOCYTES # BLD AUTO: 1.4 K/UL (ref 1–4.8)
LYMPHOCYTES NFR BLD: 22 % (ref 18–48)
MAGNESIUM SERPL-MCNC: 1.9 MG/DL (ref 1.6–2.6)
MCH RBC QN AUTO: 34.1 PG (ref 27–31)
MCHC RBC AUTO-ENTMCNC: 32 G/DL (ref 32–36)
MCV RBC AUTO: 107 FL (ref 82–98)
MONOCYTES # BLD AUTO: 0.6 K/UL (ref 0.3–1)
MONOCYTES NFR BLD: 9.4 % (ref 4–15)
NEUTROPHILS # BLD AUTO: 4 K/UL (ref 1.8–7.7)
NEUTROPHILS NFR BLD: 65.3 % (ref 38–73)
NITRITE UR QL STRIP: NEGATIVE
NRBC BLD-RTO: 0 /100 WBC
PH UR STRIP: 6.5 [PH] (ref 5–8)
PLATELET # BLD AUTO: 193 K/UL (ref 150–450)
PMV BLD AUTO: 9.1 FL (ref 9.2–12.9)
POTASSIUM SERPL-SCNC: 4 MMOL/L (ref 3.5–5.1)
PROT SERPL-MCNC: 6.9 G/DL (ref 6–8.4)
PROT UR QL STRIP: NEGATIVE
RBC # BLD AUTO: 3.05 M/UL (ref 4–5.4)
SODIUM SERPL-SCNC: 140 MMOL/L (ref 136–145)
SP GR UR STRIP: 1.02 (ref 1–1.03)
URN SPEC COLLECT METH UR: NORMAL
UROBILINOGEN UR STRIP-ACNC: 0.2 EU/DL
UUN UR-MCNC: 23 MG/DL (ref 7–18)
WBC # BLD AUTO: 6.15 K/UL (ref 3.9–12.7)

## 2021-04-07 PROCEDURE — 86304 IMMUNOASSAY TUMOR CA 125: CPT | Performed by: OBSTETRICS & GYNECOLOGY

## 2021-04-07 PROCEDURE — 83735 ASSAY OF MAGNESIUM: CPT | Performed by: OBSTETRICS & GYNECOLOGY

## 2021-04-07 PROCEDURE — 80053 COMPREHEN METABOLIC PANEL: CPT | Performed by: OBSTETRICS & GYNECOLOGY

## 2021-04-07 PROCEDURE — 85025 COMPLETE CBC W/AUTO DIFF WBC: CPT | Performed by: OBSTETRICS & GYNECOLOGY

## 2021-04-07 PROCEDURE — 86304 IMMUNOASSAY TUMOR CA 125: CPT | Mod: PN | Performed by: OBSTETRICS & GYNECOLOGY

## 2021-04-07 PROCEDURE — 80053 COMPREHEN METABOLIC PANEL: CPT | Mod: PN | Performed by: OBSTETRICS & GYNECOLOGY

## 2021-04-07 PROCEDURE — 85025 COMPLETE CBC W/AUTO DIFF WBC: CPT | Mod: PN | Performed by: OBSTETRICS & GYNECOLOGY

## 2021-04-07 PROCEDURE — 83735 ASSAY OF MAGNESIUM: CPT | Mod: PN | Performed by: OBSTETRICS & GYNECOLOGY

## 2021-04-07 PROCEDURE — 81003 URINALYSIS AUTO W/O SCOPE: CPT | Mod: PN | Performed by: OBSTETRICS & GYNECOLOGY

## 2021-04-07 PROCEDURE — 81003 URINALYSIS AUTO W/O SCOPE: CPT | Performed by: OBSTETRICS & GYNECOLOGY

## 2021-04-07 PROCEDURE — 36415 COLL VENOUS BLD VENIPUNCTURE: CPT | Mod: PN | Performed by: OBSTETRICS & GYNECOLOGY

## 2021-04-08 ENCOUNTER — PATIENT OUTREACH (OUTPATIENT)
Dept: ADMINISTRATIVE | Facility: OTHER | Age: 81
End: 2021-04-08

## 2021-04-09 ENCOUNTER — OFFICE VISIT (OUTPATIENT)
Dept: DERMATOLOGY | Facility: CLINIC | Age: 81
End: 2021-04-09
Payer: MEDICARE

## 2021-04-09 DIAGNOSIS — L82.0 INFLAMED SEBORRHEIC KERATOSIS: Primary | ICD-10-CM

## 2021-04-09 DIAGNOSIS — D22.9 MULTIPLE BENIGN NEVI: ICD-10-CM

## 2021-04-09 DIAGNOSIS — L29.9 PRURITUS: ICD-10-CM

## 2021-04-09 DIAGNOSIS — Z85.828 HISTORY OF NONMELANOMA SKIN CANCER: ICD-10-CM

## 2021-04-09 DIAGNOSIS — L90.5 SCAR: ICD-10-CM

## 2021-04-09 DIAGNOSIS — L82.1 SEBORRHEIC KERATOSES: ICD-10-CM

## 2021-04-09 DIAGNOSIS — D18.01 CHERRY ANGIOMA: ICD-10-CM

## 2021-04-09 PROCEDURE — 1157F ADVNC CARE PLAN IN RCRD: CPT | Mod: S$GLB,,, | Performed by: DERMATOLOGY

## 2021-04-09 PROCEDURE — 1159F PR MEDICATION LIST DOCUMENTED IN MEDICAL RECORD: ICD-10-PCS | Mod: S$GLB,,, | Performed by: DERMATOLOGY

## 2021-04-09 PROCEDURE — 1126F PR PAIN SEVERITY QUANTIFIED, NO PAIN PRESENT: ICD-10-PCS | Mod: S$GLB,,, | Performed by: DERMATOLOGY

## 2021-04-09 PROCEDURE — 1157F PR ADVANCE CARE PLAN OR EQUIV PRESENT IN MEDICAL RECORD: ICD-10-PCS | Mod: S$GLB,,, | Performed by: DERMATOLOGY

## 2021-04-09 PROCEDURE — 99213 OFFICE O/P EST LOW 20 MIN: CPT | Mod: 25,S$GLB,, | Performed by: DERMATOLOGY

## 2021-04-09 PROCEDURE — 99999 PR PBB SHADOW E&M-EST. PATIENT-LVL III: ICD-10-PCS | Mod: PBBFAC,,, | Performed by: DERMATOLOGY

## 2021-04-09 PROCEDURE — 17110 DESTRUCTION B9 LES UP TO 14: CPT | Mod: S$GLB,,, | Performed by: DERMATOLOGY

## 2021-04-09 PROCEDURE — 99213 PR OFFICE/OUTPT VISIT, EST, LEVL III, 20-29 MIN: ICD-10-PCS | Mod: 25,S$GLB,, | Performed by: DERMATOLOGY

## 2021-04-09 PROCEDURE — 17110 PR DESTRUCTION BENIGN LESIONS UP TO 14: ICD-10-PCS | Mod: S$GLB,,, | Performed by: DERMATOLOGY

## 2021-04-09 PROCEDURE — 1159F MED LIST DOCD IN RCRD: CPT | Mod: S$GLB,,, | Performed by: DERMATOLOGY

## 2021-04-09 PROCEDURE — 1126F AMNT PAIN NOTED NONE PRSNT: CPT | Mod: S$GLB,,, | Performed by: DERMATOLOGY

## 2021-04-09 PROCEDURE — 99999 PR PBB SHADOW E&M-EST. PATIENT-LVL III: CPT | Mod: PBBFAC,,, | Performed by: DERMATOLOGY

## 2021-04-09 RX ORDER — GABAPENTIN 100 MG/1
100 CAPSULE ORAL DAILY
COMMUNITY
Start: 2021-03-10 | End: 2021-05-24 | Stop reason: SDUPTHER

## 2021-04-30 ENCOUNTER — PATIENT MESSAGE (OUTPATIENT)
Dept: FAMILY MEDICINE | Facility: CLINIC | Age: 81
End: 2021-04-30

## 2021-05-07 ENCOUNTER — INFUSION (OUTPATIENT)
Dept: INFUSION THERAPY | Facility: HOSPITAL | Age: 81
End: 2021-05-07
Attending: OBSTETRICS & GYNECOLOGY
Payer: MEDICARE

## 2021-05-07 DIAGNOSIS — C56.1 CARCINOMA OF RIGHT OVARY: Primary | ICD-10-CM

## 2021-05-07 PROCEDURE — 25000003 PHARM REV CODE 250: Mod: PN | Performed by: OBSTETRICS & GYNECOLOGY

## 2021-05-07 PROCEDURE — 96523 IRRIG DRUG DELIVERY DEVICE: CPT | Mod: PN

## 2021-05-07 PROCEDURE — A4216 STERILE WATER/SALINE, 10 ML: HCPCS | Mod: PN | Performed by: OBSTETRICS & GYNECOLOGY

## 2021-05-07 PROCEDURE — 63600175 PHARM REV CODE 636 W HCPCS: Mod: PN | Performed by: OBSTETRICS & GYNECOLOGY

## 2021-05-07 RX ORDER — HEPARIN 100 UNIT/ML
500 SYRINGE INTRAVENOUS
Status: CANCELLED | OUTPATIENT
Start: 2021-05-07

## 2021-05-07 RX ORDER — SODIUM CHLORIDE 0.9 % (FLUSH) 0.9 %
10 SYRINGE (ML) INJECTION
Status: DISCONTINUED | OUTPATIENT
Start: 2021-05-07 | End: 2021-05-07 | Stop reason: HOSPADM

## 2021-05-07 RX ORDER — HEPARIN 100 UNIT/ML
500 SYRINGE INTRAVENOUS
Status: DISCONTINUED | OUTPATIENT
Start: 2021-05-07 | End: 2021-05-07 | Stop reason: HOSPADM

## 2021-05-07 RX ORDER — SODIUM CHLORIDE 0.9 % (FLUSH) 0.9 %
10 SYRINGE (ML) INJECTION
Status: CANCELLED | OUTPATIENT
Start: 2021-05-07

## 2021-05-07 RX ADMIN — Medication 10 ML: at 09:05

## 2021-05-07 RX ADMIN — HEPARIN 500 UNITS: 100 SYRINGE at 09:05

## 2021-05-12 LAB
LEFT EYE DM RETINOPATHY: POSITIVE
LEFT EYE DM RETINOPATHY: POSITIVE
RIGHT EYE DM RETINOPATHY: POSITIVE
RIGHT EYE DM RETINOPATHY: POSITIVE

## 2021-05-17 ENCOUNTER — PATIENT OUTREACH (OUTPATIENT)
Dept: ADMINISTRATIVE | Facility: HOSPITAL | Age: 81
End: 2021-05-17

## 2021-05-17 ENCOUNTER — LAB VISIT (OUTPATIENT)
Dept: LAB | Facility: HOSPITAL | Age: 81
End: 2021-05-17
Attending: FAMILY MEDICINE
Payer: MEDICARE

## 2021-05-17 DIAGNOSIS — D75.1 POLYCYTHEMIA, SECONDARY: ICD-10-CM

## 2021-05-17 DIAGNOSIS — E55.9 VITAMIN D DEFICIENCY DISEASE: ICD-10-CM

## 2021-05-17 DIAGNOSIS — Z11.59 NEED FOR HEPATITIS C SCREENING TEST: ICD-10-CM

## 2021-05-17 DIAGNOSIS — E78.5 HYPERLIPIDEMIA, UNSPECIFIED HYPERLIPIDEMIA TYPE: ICD-10-CM

## 2021-05-17 DIAGNOSIS — E11.9 CONTROLLED TYPE 2 DIABETES MELLITUS WITHOUT COMPLICATION, WITHOUT LONG-TERM CURRENT USE OF INSULIN: ICD-10-CM

## 2021-05-17 LAB
25(OH)D3+25(OH)D2 SERPL-MCNC: 26 NG/ML (ref 30–96)
ALBUMIN SERPL BCP-MCNC: 4 G/DL (ref 3.5–5.2)
ALP SERPL-CCNC: 52 U/L (ref 55–135)
ALT SERPL W/O P-5'-P-CCNC: 15 U/L (ref 10–44)
ANION GAP SERPL CALC-SCNC: 9 MMOL/L (ref 8–16)
AST SERPL-CCNC: 20 U/L (ref 10–40)
BASOPHILS # BLD AUTO: 0.04 K/UL (ref 0–0.2)
BASOPHILS NFR BLD: 0.7 % (ref 0–1.9)
BILIRUB SERPL-MCNC: 0.5 MG/DL (ref 0.1–1)
BUN SERPL-MCNC: 22 MG/DL (ref 8–23)
CALCIUM SERPL-MCNC: 10.2 MG/DL (ref 8.7–10.5)
CHLORIDE SERPL-SCNC: 103 MMOL/L (ref 95–110)
CHOLEST SERPL-MCNC: 168 MG/DL (ref 120–199)
CHOLEST/HDLC SERPL: 3.4 {RATIO} (ref 2–5)
CO2 SERPL-SCNC: 28 MMOL/L (ref 23–29)
CREAT SERPL-MCNC: 1 MG/DL (ref 0.5–1.4)
DIFFERENTIAL METHOD: ABNORMAL
EOSINOPHIL # BLD AUTO: 0.3 K/UL (ref 0–0.5)
EOSINOPHIL NFR BLD: 4.9 % (ref 0–8)
ERYTHROCYTE [DISTWIDTH] IN BLOOD BY AUTOMATED COUNT: 12.7 % (ref 11.5–14.5)
EST. GFR  (AFRICAN AMERICAN): >60 ML/MIN/1.73 M^2
EST. GFR  (NON AFRICAN AMERICAN): 53.3 ML/MIN/1.73 M^2
ESTIMATED AVG GLUCOSE: 123 MG/DL (ref 68–131)
GLUCOSE SERPL-MCNC: 109 MG/DL (ref 70–110)
HBA1C MFR BLD: 5.9 % (ref 4–5.6)
HCT VFR BLD AUTO: 35.9 % (ref 37–48.5)
HDLC SERPL-MCNC: 50 MG/DL (ref 40–75)
HDLC SERPL: 29.8 % (ref 20–50)
HGB BLD-MCNC: 11.4 G/DL (ref 12–16)
IMM GRANULOCYTES # BLD AUTO: 0.01 K/UL (ref 0–0.04)
IMM GRANULOCYTES NFR BLD AUTO: 0.2 % (ref 0–0.5)
LDLC SERPL CALC-MCNC: 99.4 MG/DL (ref 63–159)
LYMPHOCYTES # BLD AUTO: 1.4 K/UL (ref 1–4.8)
LYMPHOCYTES NFR BLD: 24.4 % (ref 18–48)
MCH RBC QN AUTO: 31.6 PG (ref 27–31)
MCHC RBC AUTO-ENTMCNC: 31.8 G/DL (ref 32–36)
MCV RBC AUTO: 99 FL (ref 82–98)
MONOCYTES # BLD AUTO: 0.5 K/UL (ref 0.3–1)
MONOCYTES NFR BLD: 8.1 % (ref 4–15)
NEUTROPHILS # BLD AUTO: 3.6 K/UL (ref 1.8–7.7)
NEUTROPHILS NFR BLD: 61.7 % (ref 38–73)
NONHDLC SERPL-MCNC: 118 MG/DL
NRBC BLD-RTO: 0 /100 WBC
PLATELET # BLD AUTO: 202 K/UL (ref 150–450)
PMV BLD AUTO: 9.7 FL (ref 9.2–12.9)
POTASSIUM SERPL-SCNC: 3.8 MMOL/L (ref 3.5–5.1)
PROT SERPL-MCNC: 7 G/DL (ref 6–8.4)
RBC # BLD AUTO: 3.61 M/UL (ref 4–5.4)
SODIUM SERPL-SCNC: 140 MMOL/L (ref 136–145)
TRIGL SERPL-MCNC: 93 MG/DL (ref 30–150)
WBC # BLD AUTO: 5.9 K/UL (ref 3.9–12.7)

## 2021-05-17 PROCEDURE — 80053 COMPREHEN METABOLIC PANEL: CPT | Performed by: FAMILY MEDICINE

## 2021-05-17 PROCEDURE — 86803 HEPATITIS C AB TEST: CPT | Performed by: FAMILY MEDICINE

## 2021-05-17 PROCEDURE — 83036 HEMOGLOBIN GLYCOSYLATED A1C: CPT | Performed by: FAMILY MEDICINE

## 2021-05-17 PROCEDURE — 36415 COLL VENOUS BLD VENIPUNCTURE: CPT | Mod: PO | Performed by: FAMILY MEDICINE

## 2021-05-17 PROCEDURE — 85025 COMPLETE CBC W/AUTO DIFF WBC: CPT | Performed by: FAMILY MEDICINE

## 2021-05-17 PROCEDURE — 82306 VITAMIN D 25 HYDROXY: CPT | Performed by: FAMILY MEDICINE

## 2021-05-17 PROCEDURE — 80061 LIPID PANEL: CPT | Performed by: FAMILY MEDICINE

## 2021-05-18 ENCOUNTER — PATIENT OUTREACH (OUTPATIENT)
Dept: ADMINISTRATIVE | Facility: HOSPITAL | Age: 81
End: 2021-05-18

## 2021-05-18 LAB — HCV AB SERPL QL IA: NEGATIVE

## 2021-05-24 ENCOUNTER — OFFICE VISIT (OUTPATIENT)
Dept: FAMILY MEDICINE | Facility: CLINIC | Age: 81
End: 2021-05-24
Payer: MEDICARE

## 2021-05-24 VITALS
BODY MASS INDEX: 24.96 KG/M2 | DIASTOLIC BLOOD PRESSURE: 70 MMHG | WEIGHT: 140.88 LBS | HEIGHT: 63 IN | HEART RATE: 86 BPM | SYSTOLIC BLOOD PRESSURE: 137 MMHG | TEMPERATURE: 98 F | RESPIRATION RATE: 14 BRPM | OXYGEN SATURATION: 100 %

## 2021-05-24 DIAGNOSIS — I10 ESSENTIAL HYPERTENSION: ICD-10-CM

## 2021-05-24 DIAGNOSIS — G62.0 PERIPHERAL NEUROPATHY DUE TO CHEMOTHERAPY: ICD-10-CM

## 2021-05-24 DIAGNOSIS — T45.1X5A PERIPHERAL NEUROPATHY DUE TO CHEMOTHERAPY: ICD-10-CM

## 2021-05-24 DIAGNOSIS — E78.5 HYPERLIPIDEMIA, UNSPECIFIED HYPERLIPIDEMIA TYPE: ICD-10-CM

## 2021-05-24 DIAGNOSIS — C56.1 CARCINOMA OF RIGHT OVARY: ICD-10-CM

## 2021-05-24 DIAGNOSIS — E55.9 VITAMIN D DEFICIENCY: Primary | ICD-10-CM

## 2021-05-24 DIAGNOSIS — E11.9 CONTROLLED TYPE 2 DIABETES MELLITUS WITHOUT COMPLICATION, WITHOUT LONG-TERM CURRENT USE OF INSULIN: ICD-10-CM

## 2021-05-24 PROCEDURE — 1159F PR MEDICATION LIST DOCUMENTED IN MEDICAL RECORD: ICD-10-PCS | Mod: S$GLB,,, | Performed by: FAMILY MEDICINE

## 2021-05-24 PROCEDURE — 99999 PR PBB SHADOW E&M-EST. PATIENT-LVL III: ICD-10-PCS | Mod: PBBFAC,,, | Performed by: FAMILY MEDICINE

## 2021-05-24 PROCEDURE — 3288F PR FALLS RISK ASSESSMENT DOCUMENTED: ICD-10-PCS | Mod: S$GLB,,, | Performed by: FAMILY MEDICINE

## 2021-05-24 PROCEDURE — 1126F PR PAIN SEVERITY QUANTIFIED, NO PAIN PRESENT: ICD-10-PCS | Mod: S$GLB,,, | Performed by: FAMILY MEDICINE

## 2021-05-24 PROCEDURE — 1126F AMNT PAIN NOTED NONE PRSNT: CPT | Mod: S$GLB,,, | Performed by: FAMILY MEDICINE

## 2021-05-24 PROCEDURE — 99999 PR PBB SHADOW E&M-EST. PATIENT-LVL III: CPT | Mod: PBBFAC,,, | Performed by: FAMILY MEDICINE

## 2021-05-24 PROCEDURE — 1159F MED LIST DOCD IN RCRD: CPT | Mod: S$GLB,,, | Performed by: FAMILY MEDICINE

## 2021-05-24 PROCEDURE — 99214 OFFICE O/P EST MOD 30 MIN: CPT | Mod: S$GLB,,, | Performed by: FAMILY MEDICINE

## 2021-05-24 PROCEDURE — 1157F ADVNC CARE PLAN IN RCRD: CPT | Mod: S$GLB,,, | Performed by: FAMILY MEDICINE

## 2021-05-24 PROCEDURE — 99214 PR OFFICE/OUTPT VISIT, EST, LEVL IV, 30-39 MIN: ICD-10-PCS | Mod: S$GLB,,, | Performed by: FAMILY MEDICINE

## 2021-05-24 PROCEDURE — 1101F PR PT FALLS ASSESS DOC 0-1 FALLS W/OUT INJ PAST YR: ICD-10-PCS | Mod: S$GLB,,, | Performed by: FAMILY MEDICINE

## 2021-05-24 PROCEDURE — 1101F PT FALLS ASSESS-DOCD LE1/YR: CPT | Mod: S$GLB,,, | Performed by: FAMILY MEDICINE

## 2021-05-24 PROCEDURE — 3288F FALL RISK ASSESSMENT DOCD: CPT | Mod: S$GLB,,, | Performed by: FAMILY MEDICINE

## 2021-05-24 PROCEDURE — 1157F PR ADVANCE CARE PLAN OR EQUIV PRESENT IN MEDICAL RECORD: ICD-10-PCS | Mod: S$GLB,,, | Performed by: FAMILY MEDICINE

## 2021-05-24 RX ORDER — GABAPENTIN 100 MG/1
100 CAPSULE ORAL 2 TIMES DAILY
Qty: 180 CAPSULE | Refills: 3 | Status: SHIPPED | OUTPATIENT
Start: 2021-05-24 | End: 2022-02-10 | Stop reason: SDUPTHER

## 2021-05-24 RX ORDER — ERGOCALCIFEROL 1.25 MG/1
50000 CAPSULE ORAL
Qty: 12 CAPSULE | Refills: 1 | Status: SHIPPED | OUTPATIENT
Start: 2021-05-24 | End: 2021-11-04 | Stop reason: ALTCHOICE

## 2021-06-21 ENCOUNTER — INFUSION (OUTPATIENT)
Dept: INFUSION THERAPY | Facility: HOSPITAL | Age: 81
End: 2021-06-21
Attending: OBSTETRICS & GYNECOLOGY
Payer: MEDICARE

## 2021-06-21 VITALS
SYSTOLIC BLOOD PRESSURE: 112 MMHG | BODY MASS INDEX: 25.25 KG/M2 | HEART RATE: 90 BPM | DIASTOLIC BLOOD PRESSURE: 72 MMHG | RESPIRATION RATE: 16 BRPM | TEMPERATURE: 98 F | HEIGHT: 63 IN | WEIGHT: 142.5 LBS

## 2021-06-21 DIAGNOSIS — C56.1 CARCINOMA OF RIGHT OVARY: Primary | ICD-10-CM

## 2021-06-21 PROCEDURE — 96523 IRRIG DRUG DELIVERY DEVICE: CPT

## 2021-06-21 PROCEDURE — 63600175 PHARM REV CODE 636 W HCPCS: Performed by: OBSTETRICS & GYNECOLOGY

## 2021-06-21 RX ORDER — HEPARIN 100 UNIT/ML
500 SYRINGE INTRAVENOUS
Status: DISCONTINUED | OUTPATIENT
Start: 2021-06-21 | End: 2021-06-21 | Stop reason: HOSPADM

## 2021-06-21 RX ORDER — HEPARIN 100 UNIT/ML
500 SYRINGE INTRAVENOUS
Status: CANCELLED | OUTPATIENT
Start: 2021-06-21

## 2021-06-21 RX ORDER — SODIUM CHLORIDE 0.9 % (FLUSH) 0.9 %
10 SYRINGE (ML) INJECTION
Status: CANCELLED | OUTPATIENT
Start: 2021-06-21

## 2021-06-21 RX ADMIN — HEPARIN 500 UNITS: 100 SYRINGE at 09:06

## 2021-07-08 ENCOUNTER — LAB VISIT (OUTPATIENT)
Dept: LAB | Facility: HOSPITAL | Age: 81
End: 2021-07-08
Attending: OBSTETRICS & GYNECOLOGY
Payer: MEDICARE

## 2021-07-08 DIAGNOSIS — C56.1 MALIGNANT NEOPLASM OF RIGHT OVARY: Primary | ICD-10-CM

## 2021-07-08 LAB
ALBUMIN SERPL BCP-MCNC: 4.2 G/DL (ref 3.5–5.2)
ALP SERPL-CCNC: 58 U/L (ref 55–135)
ALT SERPL W/O P-5'-P-CCNC: 13 U/L (ref 10–44)
ANION GAP SERPL CALC-SCNC: 11 MMOL/L (ref 8–16)
AST SERPL-CCNC: 16 U/L (ref 10–40)
BASOPHILS # BLD AUTO: 0.05 K/UL (ref 0–0.2)
BASOPHILS NFR BLD: 0.8 % (ref 0–1.9)
BILIRUB SERPL-MCNC: 0.5 MG/DL (ref 0.1–1)
BUN SERPL-MCNC: 22 MG/DL (ref 8–23)
CALCIUM SERPL-MCNC: 9.9 MG/DL (ref 8.7–10.5)
CANCER AG125 SERPL-ACNC: 10 U/ML (ref 0–30)
CHLORIDE SERPL-SCNC: 103 MMOL/L (ref 95–110)
CO2 SERPL-SCNC: 27 MMOL/L (ref 23–29)
CREAT SERPL-MCNC: 1.2 MG/DL (ref 0.5–1.4)
DIFFERENTIAL METHOD: ABNORMAL
EOSINOPHIL # BLD AUTO: 0.2 K/UL (ref 0–0.5)
EOSINOPHIL NFR BLD: 3.5 % (ref 0–8)
ERYTHROCYTE [DISTWIDTH] IN BLOOD BY AUTOMATED COUNT: 14.1 % (ref 11.5–14.5)
EST. GFR  (AFRICAN AMERICAN): 49.3 ML/MIN/1.73 M^2
EST. GFR  (NON AFRICAN AMERICAN): 42.8 ML/MIN/1.73 M^2
GLUCOSE SERPL-MCNC: 103 MG/DL (ref 70–110)
HCT VFR BLD AUTO: 37.3 % (ref 37–48.5)
HGB BLD-MCNC: 11.6 G/DL (ref 12–16)
IMM GRANULOCYTES # BLD AUTO: 0.02 K/UL (ref 0–0.04)
IMM GRANULOCYTES NFR BLD AUTO: 0.3 % (ref 0–0.5)
LYMPHOCYTES # BLD AUTO: 2 K/UL (ref 1–4.8)
LYMPHOCYTES NFR BLD: 30.3 % (ref 18–48)
MAGNESIUM SERPL-MCNC: 1.6 MG/DL (ref 1.6–2.6)
MCH RBC QN AUTO: 29.9 PG (ref 27–31)
MCHC RBC AUTO-ENTMCNC: 31.1 G/DL (ref 32–36)
MCV RBC AUTO: 96 FL (ref 82–98)
MONOCYTES # BLD AUTO: 0.5 K/UL (ref 0.3–1)
MONOCYTES NFR BLD: 7.7 % (ref 4–15)
NEUTROPHILS # BLD AUTO: 3.8 K/UL (ref 1.8–7.7)
NEUTROPHILS NFR BLD: 57.4 % (ref 38–73)
NRBC BLD-RTO: 0 /100 WBC
PLATELET # BLD AUTO: 230 K/UL (ref 150–450)
PMV BLD AUTO: 9.9 FL (ref 9.2–12.9)
POTASSIUM SERPL-SCNC: 4.2 MMOL/L (ref 3.5–5.1)
PROT SERPL-MCNC: 7.2 G/DL (ref 6–8.4)
RBC # BLD AUTO: 3.88 M/UL (ref 4–5.4)
SODIUM SERPL-SCNC: 141 MMOL/L (ref 136–145)
WBC # BLD AUTO: 6.64 K/UL (ref 3.9–12.7)

## 2021-07-08 PROCEDURE — 80053 COMPREHEN METABOLIC PANEL: CPT | Performed by: NURSE PRACTITIONER

## 2021-07-08 PROCEDURE — 83735 ASSAY OF MAGNESIUM: CPT | Performed by: NURSE PRACTITIONER

## 2021-07-08 PROCEDURE — 85025 COMPLETE CBC W/AUTO DIFF WBC: CPT | Performed by: NURSE PRACTITIONER

## 2021-07-08 PROCEDURE — 36415 COLL VENOUS BLD VENIPUNCTURE: CPT | Mod: PO | Performed by: NURSE PRACTITIONER

## 2021-07-08 PROCEDURE — 86304 IMMUNOASSAY TUMOR CA 125: CPT | Performed by: NURSE PRACTITIONER

## 2021-07-17 ENCOUNTER — OFFICE VISIT (OUTPATIENT)
Dept: URGENT CARE | Facility: CLINIC | Age: 81
End: 2021-07-17
Payer: MEDICARE

## 2021-07-17 VITALS
TEMPERATURE: 98 F | WEIGHT: 140 LBS | DIASTOLIC BLOOD PRESSURE: 72 MMHG | HEART RATE: 96 BPM | SYSTOLIC BLOOD PRESSURE: 130 MMHG | RESPIRATION RATE: 16 BRPM | BODY MASS INDEX: 24.8 KG/M2 | OXYGEN SATURATION: 98 % | HEIGHT: 63 IN

## 2021-07-17 DIAGNOSIS — L03.119 CELLULITIS AND ABSCESS OF LEG: Primary | ICD-10-CM

## 2021-07-17 DIAGNOSIS — L02.419 CELLULITIS AND ABSCESS OF LEG: Primary | ICD-10-CM

## 2021-07-17 PROCEDURE — 99213 OFFICE O/P EST LOW 20 MIN: CPT | Mod: S$GLB,,, | Performed by: NURSE PRACTITIONER

## 2021-07-17 PROCEDURE — 1157F PR ADVANCE CARE PLAN OR EQUIV PRESENT IN MEDICAL RECORD: ICD-10-PCS | Mod: S$GLB,,, | Performed by: NURSE PRACTITIONER

## 2021-07-17 PROCEDURE — 1157F ADVNC CARE PLAN IN RCRD: CPT | Mod: S$GLB,,, | Performed by: NURSE PRACTITIONER

## 2021-07-17 PROCEDURE — 99213 PR OFFICE/OUTPT VISIT, EST, LEVL III, 20-29 MIN: ICD-10-PCS | Mod: S$GLB,,, | Performed by: NURSE PRACTITIONER

## 2021-07-17 RX ORDER — DOXYCYCLINE HYCLATE 100 MG
100 TABLET ORAL 2 TIMES DAILY
Qty: 14 TABLET | Refills: 0 | Status: SHIPPED | OUTPATIENT
Start: 2021-07-17 | End: 2021-07-17

## 2021-07-17 RX ORDER — DOXYCYCLINE 100 MG/1
100 CAPSULE ORAL 2 TIMES DAILY
Qty: 20 CAPSULE | Refills: 0 | Status: SHIPPED | OUTPATIENT
Start: 2021-07-17 | End: 2021-11-04 | Stop reason: ALTCHOICE

## 2021-07-23 ENCOUNTER — OFFICE VISIT (OUTPATIENT)
Dept: FAMILY MEDICINE | Facility: CLINIC | Age: 81
End: 2021-07-23
Payer: MEDICARE

## 2021-07-23 ENCOUNTER — TELEPHONE (OUTPATIENT)
Dept: FAMILY MEDICINE | Facility: CLINIC | Age: 81
End: 2021-07-23

## 2021-07-23 ENCOUNTER — PATIENT MESSAGE (OUTPATIENT)
Dept: FAMILY MEDICINE | Facility: CLINIC | Age: 81
End: 2021-07-23

## 2021-07-23 VITALS
WEIGHT: 142.19 LBS | HEIGHT: 63 IN | HEART RATE: 91 BPM | TEMPERATURE: 99 F | DIASTOLIC BLOOD PRESSURE: 58 MMHG | BODY MASS INDEX: 25.2 KG/M2 | OXYGEN SATURATION: 96 % | SYSTOLIC BLOOD PRESSURE: 116 MMHG

## 2021-07-23 DIAGNOSIS — E11.22 CONTROLLED TYPE 2 DIABETES MELLITUS WITH STAGE 3 CHRONIC KIDNEY DISEASE, WITHOUT LONG-TERM CURRENT USE OF INSULIN: ICD-10-CM

## 2021-07-23 DIAGNOSIS — R94.4 DECREASED GFR: ICD-10-CM

## 2021-07-23 DIAGNOSIS — N18.30 CONTROLLED TYPE 2 DIABETES MELLITUS WITH STAGE 3 CHRONIC KIDNEY DISEASE, WITHOUT LONG-TERM CURRENT USE OF INSULIN: ICD-10-CM

## 2021-07-23 DIAGNOSIS — L03.119 CELLULITIS OF LOWER EXTREMITY, UNSPECIFIED LATERALITY: Primary | ICD-10-CM

## 2021-07-23 PROCEDURE — 99214 OFFICE O/P EST MOD 30 MIN: CPT | Mod: S$GLB,,, | Performed by: NURSE PRACTITIONER

## 2021-07-23 PROCEDURE — 3288F PR FALLS RISK ASSESSMENT DOCUMENTED: ICD-10-PCS | Mod: S$GLB,,, | Performed by: NURSE PRACTITIONER

## 2021-07-23 PROCEDURE — 1101F PT FALLS ASSESS-DOCD LE1/YR: CPT | Mod: S$GLB,,, | Performed by: NURSE PRACTITIONER

## 2021-07-23 PROCEDURE — 99999 PR PBB SHADOW E&M-EST. PATIENT-LVL IV: ICD-10-PCS | Mod: PBBFAC,,, | Performed by: NURSE PRACTITIONER

## 2021-07-23 PROCEDURE — 3288F FALL RISK ASSESSMENT DOCD: CPT | Mod: S$GLB,,, | Performed by: NURSE PRACTITIONER

## 2021-07-23 PROCEDURE — 1126F PR PAIN SEVERITY QUANTIFIED, NO PAIN PRESENT: ICD-10-PCS | Mod: S$GLB,,, | Performed by: NURSE PRACTITIONER

## 2021-07-23 PROCEDURE — 99214 PR OFFICE/OUTPT VISIT, EST, LEVL IV, 30-39 MIN: ICD-10-PCS | Mod: S$GLB,,, | Performed by: NURSE PRACTITIONER

## 2021-07-23 PROCEDURE — 1101F PR PT FALLS ASSESS DOC 0-1 FALLS W/OUT INJ PAST YR: ICD-10-PCS | Mod: S$GLB,,, | Performed by: NURSE PRACTITIONER

## 2021-07-23 PROCEDURE — 1157F ADVNC CARE PLAN IN RCRD: CPT | Mod: S$GLB,,, | Performed by: NURSE PRACTITIONER

## 2021-07-23 PROCEDURE — 1157F PR ADVANCE CARE PLAN OR EQUIV PRESENT IN MEDICAL RECORD: ICD-10-PCS | Mod: S$GLB,,, | Performed by: NURSE PRACTITIONER

## 2021-07-23 PROCEDURE — 99999 PR PBB SHADOW E&M-EST. PATIENT-LVL IV: CPT | Mod: PBBFAC,,, | Performed by: NURSE PRACTITIONER

## 2021-07-23 PROCEDURE — 3074F SYST BP LT 130 MM HG: CPT | Mod: S$GLB,,, | Performed by: NURSE PRACTITIONER

## 2021-07-23 PROCEDURE — 3074F PR MOST RECENT SYSTOLIC BLOOD PRESSURE < 130 MM HG: ICD-10-PCS | Mod: S$GLB,,, | Performed by: NURSE PRACTITIONER

## 2021-07-23 PROCEDURE — 3078F DIAST BP <80 MM HG: CPT | Mod: S$GLB,,, | Performed by: NURSE PRACTITIONER

## 2021-07-23 PROCEDURE — 1159F MED LIST DOCD IN RCRD: CPT | Mod: S$GLB,,, | Performed by: NURSE PRACTITIONER

## 2021-07-23 PROCEDURE — 1126F AMNT PAIN NOTED NONE PRSNT: CPT | Mod: S$GLB,,, | Performed by: NURSE PRACTITIONER

## 2021-07-23 PROCEDURE — 1159F PR MEDICATION LIST DOCUMENTED IN MEDICAL RECORD: ICD-10-PCS | Mod: S$GLB,,, | Performed by: NURSE PRACTITIONER

## 2021-07-23 PROCEDURE — 3078F PR MOST RECENT DIASTOLIC BLOOD PRESSURE < 80 MM HG: ICD-10-PCS | Mod: S$GLB,,, | Performed by: NURSE PRACTITIONER

## 2021-07-23 RX ORDER — SULFAMETHOXAZOLE AND TRIMETHOPRIM 400; 80 MG/1; MG/1
1 TABLET ORAL 2 TIMES DAILY
Qty: 10 TABLET | Refills: 0 | Status: SHIPPED | OUTPATIENT
Start: 2021-07-23 | End: 2021-07-28

## 2021-08-02 ENCOUNTER — INFUSION (OUTPATIENT)
Dept: INFUSION THERAPY | Facility: HOSPITAL | Age: 81
End: 2021-08-02
Attending: OBSTETRICS & GYNECOLOGY
Payer: MEDICARE

## 2021-08-02 VITALS
BODY MASS INDEX: 25.01 KG/M2 | SYSTOLIC BLOOD PRESSURE: 115 MMHG | TEMPERATURE: 98 F | HEART RATE: 97 BPM | DIASTOLIC BLOOD PRESSURE: 68 MMHG | WEIGHT: 141.13 LBS | RESPIRATION RATE: 16 BRPM | HEIGHT: 63 IN

## 2021-08-02 DIAGNOSIS — C56.1 CARCINOMA OF RIGHT OVARY: Primary | ICD-10-CM

## 2021-08-02 PROCEDURE — 63600175 PHARM REV CODE 636 W HCPCS: Performed by: OBSTETRICS & GYNECOLOGY

## 2021-08-02 PROCEDURE — 96523 IRRIG DRUG DELIVERY DEVICE: CPT

## 2021-08-02 PROCEDURE — A4216 STERILE WATER/SALINE, 10 ML: HCPCS | Performed by: OBSTETRICS & GYNECOLOGY

## 2021-08-02 PROCEDURE — 25000003 PHARM REV CODE 250: Performed by: OBSTETRICS & GYNECOLOGY

## 2021-08-02 RX ORDER — HEPARIN 100 UNIT/ML
500 SYRINGE INTRAVENOUS
Status: CANCELLED | OUTPATIENT
Start: 2021-08-02

## 2021-08-02 RX ORDER — SODIUM CHLORIDE 0.9 % (FLUSH) 0.9 %
10 SYRINGE (ML) INJECTION
Status: DISCONTINUED | OUTPATIENT
Start: 2021-08-02 | End: 2021-08-02 | Stop reason: HOSPADM

## 2021-08-02 RX ORDER — SODIUM CHLORIDE 0.9 % (FLUSH) 0.9 %
10 SYRINGE (ML) INJECTION
Status: CANCELLED | OUTPATIENT
Start: 2021-08-02

## 2021-08-02 RX ORDER — HEPARIN 100 UNIT/ML
500 SYRINGE INTRAVENOUS
Status: DISCONTINUED | OUTPATIENT
Start: 2021-08-02 | End: 2021-08-02 | Stop reason: HOSPADM

## 2021-08-02 RX ADMIN — HEPARIN 500 UNITS: 100 SYRINGE at 02:08

## 2021-08-02 RX ADMIN — SODIUM CHLORIDE, PRESERVATIVE FREE 10 ML: 5 INJECTION INTRAVENOUS at 02:08

## 2021-08-04 ENCOUNTER — PATIENT MESSAGE (OUTPATIENT)
Dept: FAMILY MEDICINE | Facility: CLINIC | Age: 81
End: 2021-08-04

## 2021-08-12 ENCOUNTER — PATIENT MESSAGE (OUTPATIENT)
Dept: FAMILY MEDICINE | Facility: CLINIC | Age: 81
End: 2021-08-12

## 2021-08-13 ENCOUNTER — PATIENT MESSAGE (OUTPATIENT)
Dept: FAMILY MEDICINE | Facility: CLINIC | Age: 81
End: 2021-08-13

## 2021-09-13 ENCOUNTER — INFUSION (OUTPATIENT)
Dept: INFUSION THERAPY | Facility: HOSPITAL | Age: 81
End: 2021-09-13
Attending: OBSTETRICS & GYNECOLOGY
Payer: MEDICARE

## 2021-09-13 VITALS
HEART RATE: 91 BPM | HEIGHT: 63 IN | SYSTOLIC BLOOD PRESSURE: 174 MMHG | OXYGEN SATURATION: 100 % | WEIGHT: 142.19 LBS | RESPIRATION RATE: 16 BRPM | DIASTOLIC BLOOD PRESSURE: 82 MMHG | BODY MASS INDEX: 25.2 KG/M2 | TEMPERATURE: 98 F

## 2021-09-13 DIAGNOSIS — C56.1 CARCINOMA OF RIGHT OVARY: Primary | ICD-10-CM

## 2021-09-13 PROCEDURE — A4216 STERILE WATER/SALINE, 10 ML: HCPCS | Performed by: OBSTETRICS & GYNECOLOGY

## 2021-09-13 PROCEDURE — 96523 IRRIG DRUG DELIVERY DEVICE: CPT

## 2021-09-13 PROCEDURE — 25000003 PHARM REV CODE 250: Performed by: OBSTETRICS & GYNECOLOGY

## 2021-09-13 PROCEDURE — 63600175 PHARM REV CODE 636 W HCPCS: Performed by: OBSTETRICS & GYNECOLOGY

## 2021-09-13 RX ORDER — HEPARIN 100 UNIT/ML
500 SYRINGE INTRAVENOUS
Status: DISCONTINUED | OUTPATIENT
Start: 2021-09-13 | End: 2021-09-13 | Stop reason: HOSPADM

## 2021-09-13 RX ORDER — SODIUM CHLORIDE 0.9 % (FLUSH) 0.9 %
10 SYRINGE (ML) INJECTION
Status: CANCELLED | OUTPATIENT
Start: 2021-09-13

## 2021-09-13 RX ORDER — HEPARIN 100 UNIT/ML
500 SYRINGE INTRAVENOUS
Status: CANCELLED | OUTPATIENT
Start: 2021-09-13

## 2021-09-13 RX ORDER — SODIUM CHLORIDE 0.9 % (FLUSH) 0.9 %
10 SYRINGE (ML) INJECTION
Status: DISCONTINUED | OUTPATIENT
Start: 2021-09-13 | End: 2021-09-13 | Stop reason: HOSPADM

## 2021-09-13 RX ADMIN — HEPARIN 500 UNITS: 100 SYRINGE at 08:09

## 2021-09-13 RX ADMIN — SODIUM CHLORIDE, PRESERVATIVE FREE 10 ML: 5 INJECTION INTRAVENOUS at 08:09

## 2021-10-11 ENCOUNTER — LAB VISIT (OUTPATIENT)
Dept: LAB | Facility: HOSPITAL | Age: 81
End: 2021-10-11
Attending: OBSTETRICS & GYNECOLOGY
Payer: MEDICARE

## 2021-10-11 DIAGNOSIS — C56.1 MALIGNANT NEOPLASM OF RIGHT OVARY: Primary | ICD-10-CM

## 2021-10-11 DIAGNOSIS — C56.9 MALIGNANT NEOPLASM OF OVARY, UNSPECIFIED LATERALITY: ICD-10-CM

## 2021-10-11 DIAGNOSIS — Z51.11 ENCOUNTER FOR ANTINEOPLASTIC CHEMOTHERAPY: ICD-10-CM

## 2021-10-11 LAB
ALBUMIN SERPL BCP-MCNC: 3.8 G/DL (ref 3.5–5.2)
ALBUMIN SERPL BCP-MCNC: 3.9 G/DL (ref 3.5–5.2)
ALP SERPL-CCNC: 49 U/L (ref 55–135)
ALP SERPL-CCNC: 50 U/L (ref 55–135)
ALT SERPL W/O P-5'-P-CCNC: 14 U/L (ref 10–44)
ALT SERPL W/O P-5'-P-CCNC: 14 U/L (ref 10–44)
ANION GAP SERPL CALC-SCNC: 14 MMOL/L (ref 8–16)
ANION GAP SERPL CALC-SCNC: 15 MMOL/L (ref 8–16)
AST SERPL-CCNC: 15 U/L (ref 10–40)
AST SERPL-CCNC: 19 U/L (ref 10–40)
BASOPHILS # BLD AUTO: 0.05 K/UL (ref 0–0.2)
BASOPHILS NFR BLD: 0.7 % (ref 0–1.9)
BILIRUB SERPL-MCNC: 0.3 MG/DL (ref 0.1–1)
BILIRUB SERPL-MCNC: 0.3 MG/DL (ref 0.1–1)
BUN SERPL-MCNC: 34 MG/DL (ref 8–23)
BUN SERPL-MCNC: 35 MG/DL (ref 8–23)
CALCIUM SERPL-MCNC: 9.6 MG/DL (ref 8.7–10.5)
CALCIUM SERPL-MCNC: 9.8 MG/DL (ref 8.7–10.5)
CANCER AG125 SERPL-ACNC: 10 U/ML (ref 0–30)
CANCER AG125 SERPL-ACNC: 9 U/ML (ref 0–30)
CHLORIDE SERPL-SCNC: 104 MMOL/L (ref 95–110)
CHLORIDE SERPL-SCNC: 104 MMOL/L (ref 95–110)
CO2 SERPL-SCNC: 22 MMOL/L (ref 23–29)
CO2 SERPL-SCNC: 23 MMOL/L (ref 23–29)
CREAT SERPL-MCNC: 1.5 MG/DL (ref 0.5–1.4)
CREAT SERPL-MCNC: 1.5 MG/DL (ref 0.5–1.4)
DIFFERENTIAL METHOD: ABNORMAL
EOSINOPHIL # BLD AUTO: 0.2 K/UL (ref 0–0.5)
EOSINOPHIL NFR BLD: 2.9 % (ref 0–8)
ERYTHROCYTE [DISTWIDTH] IN BLOOD BY AUTOMATED COUNT: 13.9 % (ref 11.5–14.5)
EST. GFR  (AFRICAN AMERICAN): 37.7 ML/MIN/1.73 M^2
EST. GFR  (AFRICAN AMERICAN): 37.7 ML/MIN/1.73 M^2
EST. GFR  (NON AFRICAN AMERICAN): 32.7 ML/MIN/1.73 M^2
EST. GFR  (NON AFRICAN AMERICAN): 32.7 ML/MIN/1.73 M^2
GLUCOSE SERPL-MCNC: 95 MG/DL (ref 70–110)
GLUCOSE SERPL-MCNC: 98 MG/DL (ref 70–110)
HCT VFR BLD AUTO: 33.7 % (ref 37–48.5)
HGB BLD-MCNC: 10.5 G/DL (ref 12–16)
IMM GRANULOCYTES # BLD AUTO: 0.02 K/UL (ref 0–0.04)
IMM GRANULOCYTES NFR BLD AUTO: 0.3 % (ref 0–0.5)
LYMPHOCYTES # BLD AUTO: 2 K/UL (ref 1–4.8)
LYMPHOCYTES NFR BLD: 28.9 % (ref 18–48)
MAGNESIUM SERPL-MCNC: 1.8 MG/DL (ref 1.6–2.6)
MAGNESIUM SERPL-MCNC: 1.8 MG/DL (ref 1.6–2.6)
MCH RBC QN AUTO: 29.2 PG (ref 27–31)
MCHC RBC AUTO-ENTMCNC: 31.2 G/DL (ref 32–36)
MCV RBC AUTO: 94 FL (ref 82–98)
MONOCYTES # BLD AUTO: 0.6 K/UL (ref 0.3–1)
MONOCYTES NFR BLD: 9.3 % (ref 4–15)
NEUTROPHILS # BLD AUTO: 3.9 K/UL (ref 1.8–7.7)
NEUTROPHILS NFR BLD: 57.9 % (ref 38–73)
NRBC BLD-RTO: 0 /100 WBC
PLATELET # BLD AUTO: 222 K/UL (ref 150–450)
PMV BLD AUTO: 9.8 FL (ref 9.2–12.9)
POTASSIUM SERPL-SCNC: 3.8 MMOL/L (ref 3.5–5.1)
POTASSIUM SERPL-SCNC: 4.1 MMOL/L (ref 3.5–5.1)
PROT SERPL-MCNC: 6.8 G/DL (ref 6–8.4)
PROT SERPL-MCNC: 6.9 G/DL (ref 6–8.4)
RBC # BLD AUTO: 3.59 M/UL (ref 4–5.4)
SODIUM SERPL-SCNC: 141 MMOL/L (ref 136–145)
SODIUM SERPL-SCNC: 141 MMOL/L (ref 136–145)
WBC # BLD AUTO: 6.79 K/UL (ref 3.9–12.7)

## 2021-10-11 PROCEDURE — 85025 COMPLETE CBC W/AUTO DIFF WBC: CPT | Performed by: NURSE PRACTITIONER

## 2021-10-11 PROCEDURE — 86304 IMMUNOASSAY TUMOR CA 125: CPT | Performed by: OBSTETRICS & GYNECOLOGY

## 2021-10-11 PROCEDURE — 86304 IMMUNOASSAY TUMOR CA 125: CPT | Mod: 91 | Performed by: NURSE PRACTITIONER

## 2021-10-11 PROCEDURE — 36415 COLL VENOUS BLD VENIPUNCTURE: CPT | Mod: PO | Performed by: OBSTETRICS & GYNECOLOGY

## 2021-10-11 PROCEDURE — 80053 COMPREHEN METABOLIC PANEL: CPT | Performed by: OBSTETRICS & GYNECOLOGY

## 2021-10-11 PROCEDURE — 80053 COMPREHEN METABOLIC PANEL: CPT | Mod: 91 | Performed by: NURSE PRACTITIONER

## 2021-10-11 PROCEDURE — 83735 ASSAY OF MAGNESIUM: CPT | Mod: 91 | Performed by: NURSE PRACTITIONER

## 2021-10-11 PROCEDURE — 83735 ASSAY OF MAGNESIUM: CPT | Performed by: OBSTETRICS & GYNECOLOGY

## 2021-10-19 ENCOUNTER — IMMUNIZATION (OUTPATIENT)
Dept: PRIMARY CARE CLINIC | Facility: CLINIC | Age: 81
End: 2021-10-19
Payer: MEDICARE

## 2021-10-19 DIAGNOSIS — Z23 NEED FOR VACCINATION: Primary | ICD-10-CM

## 2021-10-19 PROCEDURE — 91300 COVID-19, MRNA, LNP-S, PF, 30 MCG/0.3 ML DOSE VACCINE: CPT | Mod: S$GLB,,, | Performed by: FAMILY MEDICINE

## 2021-10-19 PROCEDURE — 0003A COVID-19, MRNA, LNP-S, PF, 30 MCG/0.3 ML DOSE VACCINE: CPT | Mod: S$GLB,,, | Performed by: FAMILY MEDICINE

## 2021-10-19 PROCEDURE — 91300 COVID-19, MRNA, LNP-S, PF, 30 MCG/0.3 ML DOSE VACCINE: ICD-10-PCS | Mod: S$GLB,,, | Performed by: FAMILY MEDICINE

## 2021-10-19 PROCEDURE — 0003A COVID-19, MRNA, LNP-S, PF, 30 MCG/0.3 ML DOSE VACCINE: ICD-10-PCS | Mod: S$GLB,,, | Performed by: FAMILY MEDICINE

## 2021-10-25 ENCOUNTER — INFUSION (OUTPATIENT)
Dept: INFUSION THERAPY | Facility: HOSPITAL | Age: 81
End: 2021-10-25
Attending: OBSTETRICS & GYNECOLOGY
Payer: MEDICARE

## 2021-10-25 VITALS
TEMPERATURE: 97 F | BODY MASS INDEX: 24.8 KG/M2 | HEIGHT: 63 IN | SYSTOLIC BLOOD PRESSURE: 129 MMHG | WEIGHT: 140 LBS | HEART RATE: 83 BPM | RESPIRATION RATE: 16 BRPM | DIASTOLIC BLOOD PRESSURE: 67 MMHG

## 2021-10-25 DIAGNOSIS — C56.1 CARCINOMA OF RIGHT OVARY: Primary | ICD-10-CM

## 2021-10-25 PROCEDURE — 25000003 PHARM REV CODE 250: Performed by: OBSTETRICS & GYNECOLOGY

## 2021-10-25 PROCEDURE — A4216 STERILE WATER/SALINE, 10 ML: HCPCS | Performed by: OBSTETRICS & GYNECOLOGY

## 2021-10-25 PROCEDURE — 63600175 PHARM REV CODE 636 W HCPCS: Performed by: OBSTETRICS & GYNECOLOGY

## 2021-10-25 PROCEDURE — 96523 IRRIG DRUG DELIVERY DEVICE: CPT

## 2021-10-25 RX ORDER — SODIUM CHLORIDE 0.9 % (FLUSH) 0.9 %
10 SYRINGE (ML) INJECTION
Status: CANCELLED | OUTPATIENT
Start: 2021-10-25

## 2021-10-25 RX ORDER — HEPARIN 100 UNIT/ML
500 SYRINGE INTRAVENOUS
Status: CANCELLED | OUTPATIENT
Start: 2021-10-25

## 2021-10-25 RX ORDER — SODIUM CHLORIDE 0.9 % (FLUSH) 0.9 %
10 SYRINGE (ML) INJECTION
Status: DISCONTINUED | OUTPATIENT
Start: 2021-10-25 | End: 2021-10-25 | Stop reason: HOSPADM

## 2021-10-25 RX ORDER — HEPARIN 100 UNIT/ML
500 SYRINGE INTRAVENOUS
Status: DISCONTINUED | OUTPATIENT
Start: 2021-10-25 | End: 2021-10-25 | Stop reason: HOSPADM

## 2021-10-25 RX ADMIN — HEPARIN 500 UNITS: 100 SYRINGE at 11:10

## 2021-10-25 RX ADMIN — SODIUM CHLORIDE, PRESERVATIVE FREE 10 ML: 5 INJECTION INTRAVENOUS at 11:10

## 2021-10-26 ENCOUNTER — PATIENT MESSAGE (OUTPATIENT)
Dept: FAMILY MEDICINE | Facility: CLINIC | Age: 81
End: 2021-10-26
Payer: MEDICARE

## 2021-10-29 ENCOUNTER — LAB VISIT (OUTPATIENT)
Dept: LAB | Facility: HOSPITAL | Age: 81
End: 2021-10-29
Attending: FAMILY MEDICINE
Payer: MEDICARE

## 2021-10-29 DIAGNOSIS — E11.9 CONTROLLED TYPE 2 DIABETES MELLITUS WITHOUT COMPLICATION, WITHOUT LONG-TERM CURRENT USE OF INSULIN: ICD-10-CM

## 2021-10-29 DIAGNOSIS — E78.5 HYPERLIPIDEMIA, UNSPECIFIED HYPERLIPIDEMIA TYPE: ICD-10-CM

## 2021-10-29 DIAGNOSIS — E55.9 VITAMIN D DEFICIENCY: ICD-10-CM

## 2021-10-29 LAB
25(OH)D3+25(OH)D2 SERPL-MCNC: 48 NG/ML (ref 30–96)
ALBUMIN SERPL BCP-MCNC: 3.9 G/DL (ref 3.5–5.2)
ALP SERPL-CCNC: 45 U/L (ref 55–135)
ALT SERPL W/O P-5'-P-CCNC: 16 U/L (ref 10–44)
ANION GAP SERPL CALC-SCNC: 11 MMOL/L (ref 8–16)
AST SERPL-CCNC: 17 U/L (ref 10–40)
BASOPHILS # BLD AUTO: 0.05 K/UL (ref 0–0.2)
BASOPHILS NFR BLD: 0.7 % (ref 0–1.9)
BILIRUB SERPL-MCNC: 0.6 MG/DL (ref 0.1–1)
BUN SERPL-MCNC: 23 MG/DL (ref 8–23)
CALCIUM SERPL-MCNC: 10.3 MG/DL (ref 8.7–10.5)
CHLORIDE SERPL-SCNC: 103 MMOL/L (ref 95–110)
CHOLEST SERPL-MCNC: 140 MG/DL (ref 120–199)
CHOLEST/HDLC SERPL: 2.7 {RATIO} (ref 2–5)
CO2 SERPL-SCNC: 27 MMOL/L (ref 23–29)
CREAT SERPL-MCNC: 1 MG/DL (ref 0.5–1.4)
DIFFERENTIAL METHOD: ABNORMAL
EOSINOPHIL # BLD AUTO: 0.2 K/UL (ref 0–0.5)
EOSINOPHIL NFR BLD: 3 % (ref 0–8)
ERYTHROCYTE [DISTWIDTH] IN BLOOD BY AUTOMATED COUNT: 14.2 % (ref 11.5–14.5)
EST. GFR  (AFRICAN AMERICAN): >60 ML/MIN/1.73 M^2
EST. GFR  (NON AFRICAN AMERICAN): 53.3 ML/MIN/1.73 M^2
ESTIMATED AVG GLUCOSE: 123 MG/DL (ref 68–131)
GLUCOSE SERPL-MCNC: 93 MG/DL (ref 70–110)
HBA1C MFR BLD: 5.9 % (ref 4–5.6)
HCT VFR BLD AUTO: 32.6 % (ref 37–48.5)
HDLC SERPL-MCNC: 52 MG/DL (ref 40–75)
HDLC SERPL: 37.1 % (ref 20–50)
HGB BLD-MCNC: 10.2 G/DL (ref 12–16)
IMM GRANULOCYTES # BLD AUTO: 0.02 K/UL (ref 0–0.04)
IMM GRANULOCYTES NFR BLD AUTO: 0.3 % (ref 0–0.5)
LDLC SERPL CALC-MCNC: 72.2 MG/DL (ref 63–159)
LYMPHOCYTES # BLD AUTO: 1.6 K/UL (ref 1–4.8)
LYMPHOCYTES NFR BLD: 24 % (ref 18–48)
MCH RBC QN AUTO: 29.3 PG (ref 27–31)
MCHC RBC AUTO-ENTMCNC: 31.3 G/DL (ref 32–36)
MCV RBC AUTO: 94 FL (ref 82–98)
MONOCYTES # BLD AUTO: 0.5 K/UL (ref 0.3–1)
MONOCYTES NFR BLD: 7.3 % (ref 4–15)
NEUTROPHILS # BLD AUTO: 4.4 K/UL (ref 1.8–7.7)
NEUTROPHILS NFR BLD: 64.7 % (ref 38–73)
NONHDLC SERPL-MCNC: 88 MG/DL
NRBC BLD-RTO: 0 /100 WBC
PLATELET # BLD AUTO: 234 K/UL (ref 150–450)
PMV BLD AUTO: 10 FL (ref 9.2–12.9)
POTASSIUM SERPL-SCNC: 3.7 MMOL/L (ref 3.5–5.1)
PROT SERPL-MCNC: 6.7 G/DL (ref 6–8.4)
RBC # BLD AUTO: 3.48 M/UL (ref 4–5.4)
SODIUM SERPL-SCNC: 141 MMOL/L (ref 136–145)
TRIGL SERPL-MCNC: 79 MG/DL (ref 30–150)
WBC # BLD AUTO: 6.75 K/UL (ref 3.9–12.7)

## 2021-10-29 PROCEDURE — 80053 COMPREHEN METABOLIC PANEL: CPT | Performed by: FAMILY MEDICINE

## 2021-10-29 PROCEDURE — 80061 LIPID PANEL: CPT | Performed by: FAMILY MEDICINE

## 2021-10-29 PROCEDURE — 85025 COMPLETE CBC W/AUTO DIFF WBC: CPT | Performed by: FAMILY MEDICINE

## 2021-10-29 PROCEDURE — 82306 VITAMIN D 25 HYDROXY: CPT | Performed by: FAMILY MEDICINE

## 2021-10-29 PROCEDURE — 36415 COLL VENOUS BLD VENIPUNCTURE: CPT | Mod: PO | Performed by: FAMILY MEDICINE

## 2021-10-29 PROCEDURE — 83036 HEMOGLOBIN GLYCOSYLATED A1C: CPT | Performed by: FAMILY MEDICINE

## 2021-11-04 ENCOUNTER — OFFICE VISIT (OUTPATIENT)
Dept: FAMILY MEDICINE | Facility: CLINIC | Age: 81
End: 2021-11-04
Payer: MEDICARE

## 2021-11-04 VITALS
RESPIRATION RATE: 16 BRPM | TEMPERATURE: 98 F | WEIGHT: 142.63 LBS | BODY MASS INDEX: 25.27 KG/M2 | SYSTOLIC BLOOD PRESSURE: 137 MMHG | HEART RATE: 76 BPM | OXYGEN SATURATION: 98 % | DIASTOLIC BLOOD PRESSURE: 60 MMHG | HEIGHT: 63 IN

## 2021-11-04 DIAGNOSIS — I10 PRIMARY HYPERTENSION: Primary | ICD-10-CM

## 2021-11-04 DIAGNOSIS — Z23 FLU VACCINE NEED: ICD-10-CM

## 2021-11-04 DIAGNOSIS — C56.1 CARCINOMA OF RIGHT OVARY: ICD-10-CM

## 2021-11-04 DIAGNOSIS — E55.9 VITAMIN D DEFICIENCY DISEASE: ICD-10-CM

## 2021-11-04 DIAGNOSIS — E11.9 CONTROLLED TYPE 2 DIABETES MELLITUS WITHOUT COMPLICATION, WITHOUT LONG-TERM CURRENT USE OF INSULIN: ICD-10-CM

## 2021-11-04 DIAGNOSIS — D75.1 POLYCYTHEMIA, SECONDARY: ICD-10-CM

## 2021-11-04 DIAGNOSIS — E78.5 HYPERLIPIDEMIA, UNSPECIFIED HYPERLIPIDEMIA TYPE: ICD-10-CM

## 2021-11-04 DIAGNOSIS — K21.9 GASTROESOPHAGEAL REFLUX DISEASE, UNSPECIFIED WHETHER ESOPHAGITIS PRESENT: ICD-10-CM

## 2021-11-04 DIAGNOSIS — Z87.19 HISTORY OF BARRETT'S ESOPHAGUS: ICD-10-CM

## 2021-11-04 PROCEDURE — 99214 OFFICE O/P EST MOD 30 MIN: CPT | Mod: S$GLB,,, | Performed by: FAMILY MEDICINE

## 2021-11-04 PROCEDURE — 3075F SYST BP GE 130 - 139MM HG: CPT | Mod: S$GLB,,, | Performed by: FAMILY MEDICINE

## 2021-11-04 PROCEDURE — 1126F AMNT PAIN NOTED NONE PRSNT: CPT | Mod: S$GLB,,, | Performed by: FAMILY MEDICINE

## 2021-11-04 PROCEDURE — 3288F PR FALLS RISK ASSESSMENT DOCUMENTED: ICD-10-PCS | Mod: S$GLB,,, | Performed by: FAMILY MEDICINE

## 2021-11-04 PROCEDURE — 1101F PT FALLS ASSESS-DOCD LE1/YR: CPT | Mod: S$GLB,,, | Performed by: FAMILY MEDICINE

## 2021-11-04 PROCEDURE — 1157F ADVNC CARE PLAN IN RCRD: CPT | Mod: S$GLB,,, | Performed by: FAMILY MEDICINE

## 2021-11-04 PROCEDURE — 3075F PR MOST RECENT SYSTOLIC BLOOD PRESS GE 130-139MM HG: ICD-10-PCS | Mod: S$GLB,,, | Performed by: FAMILY MEDICINE

## 2021-11-04 PROCEDURE — 1159F MED LIST DOCD IN RCRD: CPT | Mod: S$GLB,,, | Performed by: FAMILY MEDICINE

## 2021-11-04 PROCEDURE — 1157F PR ADVANCE CARE PLAN OR EQUIV PRESENT IN MEDICAL RECORD: ICD-10-PCS | Mod: S$GLB,,, | Performed by: FAMILY MEDICINE

## 2021-11-04 PROCEDURE — 3288F FALL RISK ASSESSMENT DOCD: CPT | Mod: S$GLB,,, | Performed by: FAMILY MEDICINE

## 2021-11-04 PROCEDURE — 1160F RVW MEDS BY RX/DR IN RCRD: CPT | Mod: S$GLB,,, | Performed by: FAMILY MEDICINE

## 2021-11-04 PROCEDURE — 99999 PR PBB SHADOW E&M-EST. PATIENT-LVL IV: ICD-10-PCS | Mod: PBBFAC,,, | Performed by: FAMILY MEDICINE

## 2021-11-04 PROCEDURE — 3078F PR MOST RECENT DIASTOLIC BLOOD PRESSURE < 80 MM HG: ICD-10-PCS | Mod: S$GLB,,, | Performed by: FAMILY MEDICINE

## 2021-11-04 PROCEDURE — 1126F PR PAIN SEVERITY QUANTIFIED, NO PAIN PRESENT: ICD-10-PCS | Mod: S$GLB,,, | Performed by: FAMILY MEDICINE

## 2021-11-04 PROCEDURE — 1101F PR PT FALLS ASSESS DOC 0-1 FALLS W/OUT INJ PAST YR: ICD-10-PCS | Mod: S$GLB,,, | Performed by: FAMILY MEDICINE

## 2021-11-04 PROCEDURE — 99999 PR PBB SHADOW E&M-EST. PATIENT-LVL IV: CPT | Mod: PBBFAC,,, | Performed by: FAMILY MEDICINE

## 2021-11-04 PROCEDURE — 1160F PR REVIEW ALL MEDS BY PRESCRIBER/CLIN PHARMACIST DOCUMENTED: ICD-10-PCS | Mod: S$GLB,,, | Performed by: FAMILY MEDICINE

## 2021-11-04 PROCEDURE — 3078F DIAST BP <80 MM HG: CPT | Mod: S$GLB,,, | Performed by: FAMILY MEDICINE

## 2021-11-04 PROCEDURE — 1159F PR MEDICATION LIST DOCUMENTED IN MEDICAL RECORD: ICD-10-PCS | Mod: S$GLB,,, | Performed by: FAMILY MEDICINE

## 2021-11-04 PROCEDURE — 99214 PR OFFICE/OUTPT VISIT, EST, LEVL IV, 30-39 MIN: ICD-10-PCS | Mod: S$GLB,,, | Performed by: FAMILY MEDICINE

## 2021-11-18 LAB
LEFT EYE DM RETINOPATHY: POSITIVE
RIGHT EYE DM RETINOPATHY: POSITIVE

## 2021-11-23 ENCOUNTER — PATIENT OUTREACH (OUTPATIENT)
Dept: ADMINISTRATIVE | Facility: HOSPITAL | Age: 81
End: 2021-11-23
Payer: MEDICARE

## 2021-11-27 ENCOUNTER — HOSPITAL ENCOUNTER (INPATIENT)
Facility: HOSPITAL | Age: 81
LOS: 5 days | Discharge: NURSING, REHAB OR LONG-TERM CARE | DRG: 481 | End: 2021-12-02
Attending: EMERGENCY MEDICINE
Payer: MEDICARE

## 2021-11-27 DIAGNOSIS — D62 POSTOPERATIVE ANEMIA DUE TO ACUTE BLOOD LOSS: ICD-10-CM

## 2021-11-27 DIAGNOSIS — S72.009A HIP FRACTURE: ICD-10-CM

## 2021-11-27 DIAGNOSIS — S72.001S CLOSED FRACTURE OF RIGHT HIP, SEQUELA: ICD-10-CM

## 2021-11-27 DIAGNOSIS — M25.551 RIGHT HIP PAIN: Primary | ICD-10-CM

## 2021-11-27 DIAGNOSIS — S72.21XA: ICD-10-CM

## 2021-11-27 DIAGNOSIS — S72.001A CLOSED FRACTURE OF RIGHT HIP, INITIAL ENCOUNTER: ICD-10-CM

## 2021-11-27 DIAGNOSIS — R07.9 CHEST PAIN: ICD-10-CM

## 2021-11-27 LAB
ALBUMIN SERPL BCP-MCNC: 3.8 G/DL (ref 3.5–5.2)
ALP SERPL-CCNC: 35 U/L (ref 55–135)
ALT SERPL W/O P-5'-P-CCNC: 18 U/L (ref 10–44)
ANION GAP SERPL CALC-SCNC: 11 MMOL/L (ref 8–16)
AST SERPL-CCNC: 20 U/L (ref 10–40)
BASOPHILS # BLD AUTO: 0.04 K/UL (ref 0–0.2)
BASOPHILS NFR BLD: 0.6 % (ref 0–1.9)
BILIRUB SERPL-MCNC: 0.7 MG/DL (ref 0.1–1)
BNP SERPL-MCNC: 107 PG/ML (ref 0–99)
BUN SERPL-MCNC: 32 MG/DL (ref 8–23)
CALCIUM SERPL-MCNC: 8.7 MG/DL (ref 8.7–10.5)
CHLORIDE SERPL-SCNC: 106 MMOL/L (ref 95–110)
CO2 SERPL-SCNC: 23 MMOL/L (ref 23–29)
CREAT SERPL-MCNC: 1.4 MG/DL (ref 0.5–1.4)
DIFFERENTIAL METHOD: ABNORMAL
EOSINOPHIL # BLD AUTO: 0.2 K/UL (ref 0–0.5)
EOSINOPHIL NFR BLD: 3.3 % (ref 0–8)
ERYTHROCYTE [DISTWIDTH] IN BLOOD BY AUTOMATED COUNT: 14.3 % (ref 11.5–14.5)
EST. GFR  (AFRICAN AMERICAN): 40.9 ML/MIN/1.73 M^2
EST. GFR  (NON AFRICAN AMERICAN): 35.5 ML/MIN/1.73 M^2
GLUCOSE SERPL-MCNC: 122 MG/DL (ref 70–110)
GLUCOSE SERPL-MCNC: 97 MG/DL (ref 70–110)
HCT VFR BLD AUTO: 31.2 % (ref 37–48.5)
HGB BLD-MCNC: 10 G/DL (ref 12–16)
IMM GRANULOCYTES # BLD AUTO: 0.03 K/UL (ref 0–0.04)
IMM GRANULOCYTES NFR BLD AUTO: 0.4 % (ref 0–0.5)
LYMPHOCYTES # BLD AUTO: 2.1 K/UL (ref 1–4.8)
LYMPHOCYTES NFR BLD: 30.4 % (ref 18–48)
MCH RBC QN AUTO: 29.8 PG (ref 27–31)
MCHC RBC AUTO-ENTMCNC: 32.1 G/DL (ref 32–36)
MCV RBC AUTO: 93 FL (ref 82–98)
MONOCYTES # BLD AUTO: 0.6 K/UL (ref 0.3–1)
MONOCYTES NFR BLD: 8.2 % (ref 4–15)
NEUTROPHILS # BLD AUTO: 4 K/UL (ref 1.8–7.7)
NEUTROPHILS NFR BLD: 57.1 % (ref 38–73)
NRBC BLD-RTO: 0 /100 WBC
PLATELET # BLD AUTO: 190 K/UL (ref 150–450)
PMV BLD AUTO: 9.8 FL (ref 9.2–12.9)
POTASSIUM SERPL-SCNC: 3.4 MMOL/L (ref 3.5–5.1)
PROT SERPL-MCNC: 6.1 G/DL (ref 6–8.4)
RBC # BLD AUTO: 3.36 M/UL (ref 4–5.4)
SARS-COV-2 RDRP RESP QL NAA+PROBE: NEGATIVE
SODIUM SERPL-SCNC: 140 MMOL/L (ref 136–145)
WBC # BLD AUTO: 6.93 K/UL (ref 3.9–12.7)

## 2021-11-27 PROCEDURE — 93005 ELECTROCARDIOGRAM TRACING: CPT | Performed by: SPECIALIST

## 2021-11-27 PROCEDURE — 63600175 PHARM REV CODE 636 W HCPCS: Performed by: STUDENT IN AN ORGANIZED HEALTH CARE EDUCATION/TRAINING PROGRAM

## 2021-11-27 PROCEDURE — 99900031 HC PATIENT EDUCATION (STAT)

## 2021-11-27 PROCEDURE — 94760 N-INVAS EAR/PLS OXIMETRY 1: CPT

## 2021-11-27 PROCEDURE — U0002 COVID-19 LAB TEST NON-CDC: HCPCS | Performed by: EMERGENCY MEDICINE

## 2021-11-27 PROCEDURE — 25000003 PHARM REV CODE 250: Performed by: STUDENT IN AN ORGANIZED HEALTH CARE EDUCATION/TRAINING PROGRAM

## 2021-11-27 PROCEDURE — 93010 ELECTROCARDIOGRAM REPORT: CPT | Mod: ,,, | Performed by: SPECIALIST

## 2021-11-27 PROCEDURE — 99285 EMERGENCY DEPT VISIT HI MDM: CPT | Mod: 25

## 2021-11-27 PROCEDURE — 12000002 HC ACUTE/MED SURGE SEMI-PRIVATE ROOM

## 2021-11-27 PROCEDURE — 80053 COMPREHEN METABOLIC PANEL: CPT | Performed by: EMERGENCY MEDICINE

## 2021-11-27 PROCEDURE — 99900035 HC TECH TIME PER 15 MIN (STAT)

## 2021-11-27 PROCEDURE — 93010 EKG 12-LEAD: ICD-10-PCS | Mod: ,,, | Performed by: SPECIALIST

## 2021-11-27 PROCEDURE — 85025 COMPLETE CBC W/AUTO DIFF WBC: CPT | Performed by: EMERGENCY MEDICINE

## 2021-11-27 PROCEDURE — 83880 ASSAY OF NATRIURETIC PEPTIDE: CPT | Performed by: EMERGENCY MEDICINE

## 2021-11-27 RX ORDER — ONDANSETRON 2 MG/ML
4 INJECTION INTRAMUSCULAR; INTRAVENOUS EVERY 8 HOURS PRN
Status: DISCONTINUED | OUTPATIENT
Start: 2021-11-27 | End: 2021-12-02 | Stop reason: HOSPADM

## 2021-11-27 RX ORDER — POTASSIUM CHLORIDE 7.45 MG/ML
40 INJECTION INTRAVENOUS
Status: DISCONTINUED | OUTPATIENT
Start: 2021-11-27 | End: 2021-12-02 | Stop reason: HOSPADM

## 2021-11-27 RX ORDER — POTASSIUM CHLORIDE 20 MEQ/1
20 TABLET, EXTENDED RELEASE ORAL
Status: DISCONTINUED | OUTPATIENT
Start: 2021-11-27 | End: 2021-12-02 | Stop reason: HOSPADM

## 2021-11-27 RX ORDER — ACETAMINOPHEN 325 MG/1
650 TABLET ORAL EVERY 8 HOURS PRN
Status: DISCONTINUED | OUTPATIENT
Start: 2021-11-27 | End: 2021-12-02 | Stop reason: HOSPADM

## 2021-11-27 RX ORDER — POTASSIUM CHLORIDE 7.45 MG/ML
20 INJECTION INTRAVENOUS
Status: DISCONTINUED | OUTPATIENT
Start: 2021-11-27 | End: 2021-12-02 | Stop reason: HOSPADM

## 2021-11-27 RX ORDER — POTASSIUM CHLORIDE 20 MEQ/1
40 TABLET, EXTENDED RELEASE ORAL
Status: DISCONTINUED | OUTPATIENT
Start: 2021-11-27 | End: 2021-12-02 | Stop reason: HOSPADM

## 2021-11-27 RX ORDER — MAGNESIUM SULFATE HEPTAHYDRATE 40 MG/ML
2 INJECTION, SOLUTION INTRAVENOUS
Status: DISCONTINUED | OUTPATIENT
Start: 2021-11-27 | End: 2021-12-02 | Stop reason: HOSPADM

## 2021-11-27 RX ORDER — ATORVASTATIN CALCIUM 20 MG/1
20 TABLET, FILM COATED ORAL NIGHTLY
Status: DISCONTINUED | OUTPATIENT
Start: 2021-11-27 | End: 2021-12-02 | Stop reason: HOSPADM

## 2021-11-27 RX ORDER — MORPHINE SULFATE 2 MG/ML
2 INJECTION, SOLUTION INTRAMUSCULAR; INTRAVENOUS EVERY 4 HOURS PRN
Status: DISCONTINUED | OUTPATIENT
Start: 2021-11-27 | End: 2021-12-02 | Stop reason: HOSPADM

## 2021-11-27 RX ORDER — MAGNESIUM SULFATE HEPTAHYDRATE 40 MG/ML
4 INJECTION, SOLUTION INTRAVENOUS
Status: DISCONTINUED | OUTPATIENT
Start: 2021-11-27 | End: 2021-12-02 | Stop reason: HOSPADM

## 2021-11-27 RX ORDER — HYDRALAZINE HYDROCHLORIDE 20 MG/ML
10 INJECTION INTRAMUSCULAR; INTRAVENOUS EVERY 6 HOURS PRN
Status: DISCONTINUED | OUTPATIENT
Start: 2021-11-27 | End: 2021-12-02 | Stop reason: HOSPADM

## 2021-11-27 RX ORDER — MAGNESIUM SULFATE 1 G/100ML
1 INJECTION INTRAVENOUS
Status: DISCONTINUED | OUTPATIENT
Start: 2021-11-27 | End: 2021-12-02 | Stop reason: HOSPADM

## 2021-11-27 RX ORDER — SODIUM CHLORIDE 0.9 % (FLUSH) 0.9 %
10 SYRINGE (ML) INJECTION
Status: DISCONTINUED | OUTPATIENT
Start: 2021-11-27 | End: 2021-11-28

## 2021-11-27 RX ORDER — TALC
6 POWDER (GRAM) TOPICAL NIGHTLY PRN
Status: DISCONTINUED | OUTPATIENT
Start: 2021-11-27 | End: 2021-12-02 | Stop reason: HOSPADM

## 2021-11-27 RX ORDER — POLYETHYLENE GLYCOL 3350 17 G/17G
17 POWDER, FOR SOLUTION ORAL 2 TIMES DAILY PRN
Status: DISCONTINUED | OUTPATIENT
Start: 2021-11-27 | End: 2021-12-02 | Stop reason: HOSPADM

## 2021-11-27 RX ORDER — LANOLIN ALCOHOL/MO/W.PET/CERES
800 CREAM (GRAM) TOPICAL
Status: DISCONTINUED | OUTPATIENT
Start: 2021-11-27 | End: 2021-12-02 | Stop reason: HOSPADM

## 2021-11-27 RX ADMIN — MORPHINE SULFATE 2 MG: 2 INJECTION, SOLUTION INTRAMUSCULAR; INTRAVENOUS at 08:11

## 2021-11-27 RX ADMIN — POTASSIUM CHLORIDE 40 MEQ: 1500 TABLET, EXTENDED RELEASE ORAL at 10:11

## 2021-11-27 RX ADMIN — MELATONIN 6 MG: at 10:11

## 2021-11-27 RX ADMIN — ATORVASTATIN CALCIUM 20 MG: 20 TABLET, FILM COATED ORAL at 10:11

## 2021-11-27 RX ADMIN — ONDANSETRON 4 MG: 2 INJECTION INTRAMUSCULAR; INTRAVENOUS at 08:11

## 2021-11-28 ENCOUNTER — ANESTHESIA (OUTPATIENT)
Dept: SURGERY | Facility: HOSPITAL | Age: 81
DRG: 481 | End: 2021-11-28
Payer: MEDICARE

## 2021-11-28 ENCOUNTER — ANESTHESIA EVENT (OUTPATIENT)
Dept: SURGERY | Facility: HOSPITAL | Age: 81
DRG: 481 | End: 2021-11-28
Payer: MEDICARE

## 2021-11-28 LAB
ABO + RH BLD: NORMAL
ANION GAP SERPL CALC-SCNC: 10 MMOL/L (ref 8–16)
BASOPHILS # BLD AUTO: 0.04 K/UL (ref 0–0.2)
BASOPHILS NFR BLD: 0.4 % (ref 0–1.9)
BLD GP AB SCN CELLS X3 SERPL QL: NORMAL
BUN SERPL-MCNC: 25 MG/DL (ref 8–23)
CALCIUM SERPL-MCNC: 9.2 MG/DL (ref 8.7–10.5)
CHLORIDE SERPL-SCNC: 103 MMOL/L (ref 95–110)
CO2 SERPL-SCNC: 28 MMOL/L (ref 23–29)
CREAT SERPL-MCNC: 1.2 MG/DL (ref 0.5–1.4)
DIFFERENTIAL METHOD: ABNORMAL
EOSINOPHIL # BLD AUTO: 0.2 K/UL (ref 0–0.5)
EOSINOPHIL NFR BLD: 1.8 % (ref 0–8)
ERYTHROCYTE [DISTWIDTH] IN BLOOD BY AUTOMATED COUNT: 14.3 % (ref 11.5–14.5)
EST. GFR  (AFRICAN AMERICAN): 49.3 ML/MIN/1.73 M^2
EST. GFR  (NON AFRICAN AMERICAN): 42.8 ML/MIN/1.73 M^2
GLUCOSE SERPL-MCNC: 108 MG/DL (ref 70–110)
GLUCOSE SERPL-MCNC: 111 MG/DL (ref 70–110)
GLUCOSE SERPL-MCNC: 124 MG/DL (ref 70–110)
GLUCOSE SERPL-MCNC: 146 MG/DL (ref 70–110)
GLUCOSE SERPL-MCNC: 175 MG/DL (ref 70–110)
GLUCOSE SERPL-MCNC: 198 MG/DL (ref 70–110)
HCT VFR BLD AUTO: 33.4 % (ref 37–48.5)
HGB BLD-MCNC: 10.6 G/DL (ref 12–16)
IMM GRANULOCYTES # BLD AUTO: 0.03 K/UL (ref 0–0.04)
IMM GRANULOCYTES NFR BLD AUTO: 0.3 % (ref 0–0.5)
LYMPHOCYTES # BLD AUTO: 1.7 K/UL (ref 1–4.8)
LYMPHOCYTES NFR BLD: 18.3 % (ref 18–48)
MCH RBC QN AUTO: 29.3 PG (ref 27–31)
MCHC RBC AUTO-ENTMCNC: 31.7 G/DL (ref 32–36)
MCV RBC AUTO: 92 FL (ref 82–98)
MONOCYTES # BLD AUTO: 0.8 K/UL (ref 0.3–1)
MONOCYTES NFR BLD: 8.3 % (ref 4–15)
NEUTROPHILS # BLD AUTO: 6.4 K/UL (ref 1.8–7.7)
NEUTROPHILS NFR BLD: 70.9 % (ref 38–73)
NRBC BLD-RTO: 0 /100 WBC
PLATELET # BLD AUTO: 191 K/UL (ref 150–450)
PMV BLD AUTO: 9.7 FL (ref 9.2–12.9)
POTASSIUM SERPL-SCNC: 4.2 MMOL/L (ref 3.5–5.1)
RBC # BLD AUTO: 3.62 M/UL (ref 4–5.4)
SODIUM SERPL-SCNC: 141 MMOL/L (ref 136–145)
WBC # BLD AUTO: 9.04 K/UL (ref 3.9–12.7)

## 2021-11-28 PROCEDURE — 99900035 HC TECH TIME PER 15 MIN (STAT)

## 2021-11-28 PROCEDURE — 94761 N-INVAS EAR/PLS OXIMETRY MLT: CPT

## 2021-11-28 PROCEDURE — 63600175 PHARM REV CODE 636 W HCPCS: Performed by: NURSE ANESTHETIST, CERTIFIED REGISTERED

## 2021-11-28 PROCEDURE — 71000039 HC RECOVERY, EACH ADD'L HOUR: Performed by: ORTHOPAEDIC SURGERY

## 2021-11-28 PROCEDURE — 27201423 OPTIME MED/SURG SUP & DEVICES STERILE SUPPLY: Performed by: ORTHOPAEDIC SURGERY

## 2021-11-28 PROCEDURE — 27245 TREAT THIGH FRACTURE: CPT | Mod: RT,,, | Performed by: ORTHOPAEDIC SURGERY

## 2021-11-28 PROCEDURE — 86920 COMPATIBILITY TEST SPIN: CPT | Performed by: INTERNAL MEDICINE

## 2021-11-28 PROCEDURE — 25000003 PHARM REV CODE 250: Performed by: ORTHOPAEDIC SURGERY

## 2021-11-28 PROCEDURE — C1769 GUIDE WIRE: HCPCS | Performed by: ORTHOPAEDIC SURGERY

## 2021-11-28 PROCEDURE — 37000009 HC ANESTHESIA EA ADD 15 MINS: Performed by: ORTHOPAEDIC SURGERY

## 2021-11-28 PROCEDURE — 99900031 HC PATIENT EDUCATION (STAT)

## 2021-11-28 PROCEDURE — 27000284 HC CANNULA NASAL: Performed by: ANESTHESIOLOGY

## 2021-11-28 PROCEDURE — 63600175 PHARM REV CODE 636 W HCPCS: Performed by: ANESTHESIOLOGY

## 2021-11-28 PROCEDURE — 94799 UNLISTED PULMONARY SVC/PX: CPT

## 2021-11-28 PROCEDURE — 36000710: Performed by: ORTHOPAEDIC SURGERY

## 2021-11-28 PROCEDURE — 63600175 PHARM REV CODE 636 W HCPCS: Performed by: ORTHOPAEDIC SURGERY

## 2021-11-28 PROCEDURE — 36415 COLL VENOUS BLD VENIPUNCTURE: CPT | Performed by: STUDENT IN AN ORGANIZED HEALTH CARE EDUCATION/TRAINING PROGRAM

## 2021-11-28 PROCEDURE — 82962 GLUCOSE BLOOD TEST: CPT | Performed by: ORTHOPAEDIC SURGERY

## 2021-11-28 PROCEDURE — 37000008 HC ANESTHESIA 1ST 15 MINUTES: Performed by: ORTHOPAEDIC SURGERY

## 2021-11-28 PROCEDURE — 27202107 HC XP QUATRO SENSOR: Performed by: ANESTHESIOLOGY

## 2021-11-28 PROCEDURE — 36000711: Performed by: ORTHOPAEDIC SURGERY

## 2021-11-28 PROCEDURE — 27000221 HC OXYGEN, UP TO 24 HOURS

## 2021-11-28 PROCEDURE — 27000673 HC TUBING BLOOD Y: Performed by: ANESTHESIOLOGY

## 2021-11-28 PROCEDURE — 12000002 HC ACUTE/MED SURGE SEMI-PRIVATE ROOM

## 2021-11-28 PROCEDURE — 99223 1ST HOSP IP/OBS HIGH 75: CPT | Mod: 57,,, | Performed by: ORTHOPAEDIC SURGERY

## 2021-11-28 PROCEDURE — 99223 PR INITIAL HOSPITAL CARE,LEVL III: ICD-10-PCS | Mod: 57,,, | Performed by: ORTHOPAEDIC SURGERY

## 2021-11-28 PROCEDURE — 63600175 PHARM REV CODE 636 W HCPCS: Performed by: STUDENT IN AN ORGANIZED HEALTH CARE EDUCATION/TRAINING PROGRAM

## 2021-11-28 PROCEDURE — C1713 ANCHOR/SCREW BN/BN,TIS/BN: HCPCS | Performed by: ORTHOPAEDIC SURGERY

## 2021-11-28 PROCEDURE — 25000003 PHARM REV CODE 250: Performed by: ANESTHESIOLOGY

## 2021-11-28 PROCEDURE — 27201107 HC STYLET, STANDARD: Performed by: ANESTHESIOLOGY

## 2021-11-28 PROCEDURE — 71000033 HC RECOVERY, INTIAL HOUR: Performed by: ORTHOPAEDIC SURGERY

## 2021-11-28 PROCEDURE — 27100025 HC TUBING, SET FLUID WARMER: Performed by: ANESTHESIOLOGY

## 2021-11-28 PROCEDURE — 85025 COMPLETE CBC W/AUTO DIFF WBC: CPT | Performed by: STUDENT IN AN ORGANIZED HEALTH CARE EDUCATION/TRAINING PROGRAM

## 2021-11-28 PROCEDURE — 80048 BASIC METABOLIC PNL TOTAL CA: CPT | Performed by: STUDENT IN AN ORGANIZED HEALTH CARE EDUCATION/TRAINING PROGRAM

## 2021-11-28 PROCEDURE — 27245 PR OPEN FIX INTER/SUBTROCH FX,IMPLNT: ICD-10-PCS | Mod: RT,,, | Performed by: ORTHOPAEDIC SURGERY

## 2021-11-28 PROCEDURE — 27000671 HC TUBING MICROBORE EXT: Performed by: ANESTHESIOLOGY

## 2021-11-28 PROCEDURE — 25000003 PHARM REV CODE 250: Performed by: NURSE ANESTHETIST, CERTIFIED REGISTERED

## 2021-11-28 PROCEDURE — 36415 COLL VENOUS BLD VENIPUNCTURE: CPT | Performed by: ORTHOPAEDIC SURGERY

## 2021-11-28 PROCEDURE — 86900 BLOOD TYPING SEROLOGIC ABO: CPT | Performed by: ORTHOPAEDIC SURGERY

## 2021-11-28 DEVICE — IMPLANTABLE DEVICE: Type: IMPLANTABLE DEVICE | Site: HIP | Status: FUNCTIONAL

## 2021-11-28 RX ORDER — PHENYLEPHRINE HYDROCHLORIDE 10 MG/ML
INJECTION INTRAVENOUS
Status: DISCONTINUED | OUTPATIENT
Start: 2021-11-28 | End: 2021-11-28

## 2021-11-28 RX ORDER — HYDROCODONE BITARTRATE AND ACETAMINOPHEN 5; 325 MG/1; MG/1
1 TABLET ORAL EVERY 4 HOURS PRN
Status: DISCONTINUED | OUTPATIENT
Start: 2021-11-28 | End: 2021-12-02 | Stop reason: HOSPADM

## 2021-11-28 RX ORDER — ONDANSETRON 2 MG/ML
INJECTION INTRAMUSCULAR; INTRAVENOUS
Status: DISCONTINUED | OUTPATIENT
Start: 2021-11-28 | End: 2021-11-28

## 2021-11-28 RX ORDER — ENOXAPARIN SODIUM 100 MG/ML
40 INJECTION SUBCUTANEOUS EVERY 24 HOURS
Status: DISCONTINUED | OUTPATIENT
Start: 2021-11-29 | End: 2021-12-02 | Stop reason: HOSPADM

## 2021-11-28 RX ORDER — OXYCODONE HYDROCHLORIDE 5 MG/1
5 TABLET ORAL
Status: DISCONTINUED | OUTPATIENT
Start: 2021-11-28 | End: 2021-11-28

## 2021-11-28 RX ORDER — CEFAZOLIN SODIUM 2 G/50ML
2 SOLUTION INTRAVENOUS
Status: COMPLETED | OUTPATIENT
Start: 2021-11-28 | End: 2021-11-28

## 2021-11-28 RX ORDER — MEPERIDINE HYDROCHLORIDE 50 MG/ML
12.5 INJECTION INTRAMUSCULAR; INTRAVENOUS; SUBCUTANEOUS EVERY 10 MIN PRN
Status: DISCONTINUED | OUTPATIENT
Start: 2021-11-28 | End: 2021-11-28

## 2021-11-28 RX ORDER — LIDOCAINE HYDROCHLORIDE 20 MG/ML
INJECTION, SOLUTION EPIDURAL; INFILTRATION; INTRACAUDAL; PERINEURAL
Status: DISCONTINUED | OUTPATIENT
Start: 2021-11-28 | End: 2021-11-28

## 2021-11-28 RX ORDER — DIPHENHYDRAMINE HYDROCHLORIDE 50 MG/ML
12.5 INJECTION INTRAMUSCULAR; INTRAVENOUS
Status: DISCONTINUED | OUTPATIENT
Start: 2021-11-28 | End: 2021-11-28

## 2021-11-28 RX ORDER — GABAPENTIN 100 MG/1
CAPSULE ORAL
Status: DISCONTINUED | OUTPATIENT
Start: 2021-11-28 | End: 2021-11-28

## 2021-11-28 RX ORDER — FENTANYL CITRATE 50 UG/ML
INJECTION, SOLUTION INTRAMUSCULAR; INTRAVENOUS
Status: DISCONTINUED | OUTPATIENT
Start: 2021-11-28 | End: 2021-11-28

## 2021-11-28 RX ORDER — DIPHENHYDRAMINE HYDROCHLORIDE 50 MG/ML
INJECTION INTRAMUSCULAR; INTRAVENOUS
Status: DISCONTINUED | OUTPATIENT
Start: 2021-11-28 | End: 2021-11-28

## 2021-11-28 RX ORDER — ONDANSETRON 2 MG/ML
4 INJECTION INTRAMUSCULAR; INTRAVENOUS DAILY PRN
Status: DISCONTINUED | OUTPATIENT
Start: 2021-11-28 | End: 2021-11-28

## 2021-11-28 RX ORDER — ACETAMINOPHEN 10 MG/ML
INJECTION, SOLUTION INTRAVENOUS
Status: DISCONTINUED | OUTPATIENT
Start: 2021-11-28 | End: 2021-11-28

## 2021-11-28 RX ORDER — SODIUM CHLORIDE 9 MG/ML
INJECTION, SOLUTION INTRAVENOUS CONTINUOUS
Status: DISCONTINUED | OUTPATIENT
Start: 2021-11-28 | End: 2021-11-30

## 2021-11-28 RX ORDER — SODIUM CHLORIDE, SODIUM LACTATE, POTASSIUM CHLORIDE, CALCIUM CHLORIDE 600; 310; 30; 20 MG/100ML; MG/100ML; MG/100ML; MG/100ML
INJECTION, SOLUTION INTRAVENOUS CONTINUOUS PRN
Status: DISCONTINUED | OUTPATIENT
Start: 2021-11-28 | End: 2021-11-28

## 2021-11-28 RX ORDER — GABAPENTIN 300 MG/1
CAPSULE ORAL
Status: COMPLETED
Start: 2021-11-28 | End: 2021-11-28

## 2021-11-28 RX ORDER — SODIUM CHLORIDE 0.9 % (FLUSH) 0.9 %
10 SYRINGE (ML) INJECTION
Status: DISCONTINUED | OUTPATIENT
Start: 2021-11-28 | End: 2021-12-02 | Stop reason: HOSPADM

## 2021-11-28 RX ORDER — DEXAMETHASONE SODIUM PHOSPHATE 4 MG/ML
INJECTION, SOLUTION INTRA-ARTICULAR; INTRALESIONAL; INTRAMUSCULAR; INTRAVENOUS; SOFT TISSUE
Status: DISCONTINUED | OUTPATIENT
Start: 2021-11-28 | End: 2021-11-28

## 2021-11-28 RX ORDER — ROCURONIUM BROMIDE 10 MG/ML
INJECTION, SOLUTION INTRAVENOUS
Status: DISCONTINUED | OUTPATIENT
Start: 2021-11-28 | End: 2021-11-28

## 2021-11-28 RX ORDER — LOPERAMIDE HYDROCHLORIDE 2 MG/1
2 CAPSULE ORAL CONTINUOUS PRN
Status: DISCONTINUED | OUTPATIENT
Start: 2021-11-28 | End: 2021-12-02 | Stop reason: HOSPADM

## 2021-11-28 RX ORDER — LIDOCAINE HYDROCHLORIDE 20 MG/ML
JELLY TOPICAL
Status: DISCONTINUED | OUTPATIENT
Start: 2021-11-28 | End: 2021-11-28

## 2021-11-28 RX ORDER — AMOXICILLIN 250 MG
1 CAPSULE ORAL 2 TIMES DAILY
Status: DISCONTINUED | OUTPATIENT
Start: 2021-11-28 | End: 2021-12-02 | Stop reason: HOSPADM

## 2021-11-28 RX ORDER — MORPHINE SULFATE 2 MG/ML
2 INJECTION, SOLUTION INTRAMUSCULAR; INTRAVENOUS
Status: COMPLETED | OUTPATIENT
Start: 2021-11-28 | End: 2021-11-28

## 2021-11-28 RX ORDER — SODIUM CHLORIDE 0.9 % (FLUSH) 0.9 %
10 SYRINGE (ML) INJECTION
Status: DISCONTINUED | OUTPATIENT
Start: 2021-11-28 | End: 2021-11-28

## 2021-11-28 RX ORDER — SUCCINYLCHOLINE CHLORIDE 20 MG/ML
INJECTION INTRAMUSCULAR; INTRAVENOUS
Status: DISCONTINUED | OUTPATIENT
Start: 2021-11-28 | End: 2021-11-28

## 2021-11-28 RX ORDER — SODIUM CHLORIDE 0.9 G/100ML
IRRIGANT IRRIGATION
Status: DISCONTINUED | OUTPATIENT
Start: 2021-11-28 | End: 2021-11-28 | Stop reason: HOSPADM

## 2021-11-28 RX ORDER — PROPOFOL 10 MG/ML
VIAL (ML) INTRAVENOUS
Status: DISCONTINUED | OUTPATIENT
Start: 2021-11-28 | End: 2021-11-28

## 2021-11-28 RX ORDER — FAMOTIDINE 10 MG/ML
INJECTION INTRAVENOUS
Status: DISCONTINUED | OUTPATIENT
Start: 2021-11-28 | End: 2021-11-28

## 2021-11-28 RX ADMIN — ACETAMINOPHEN 1000 MG: 10 INJECTION, SOLUTION INTRAVENOUS at 07:11

## 2021-11-28 RX ADMIN — ONDANSETRON 4 MG: 2 INJECTION INTRAMUSCULAR; INTRAVENOUS at 07:11

## 2021-11-28 RX ADMIN — MORPHINE SULFATE 2 MG: 2 INJECTION, SOLUTION INTRAMUSCULAR; INTRAVENOUS at 09:11

## 2021-11-28 RX ADMIN — FENTANYL CITRATE 50 MCG: 50 INJECTION INTRAMUSCULAR; INTRAVENOUS at 07:11

## 2021-11-28 RX ADMIN — SENNOSIDES AND DOCUSATE SODIUM 1 TABLET: 50; 8.6 TABLET ORAL at 10:11

## 2021-11-28 RX ADMIN — DEXAMETHASONE SODIUM PHOSPHATE 4 MG: 4 INJECTION, SOLUTION INTRAMUSCULAR; INTRAVENOUS at 07:11

## 2021-11-28 RX ADMIN — MELATONIN 6 MG: at 11:11

## 2021-11-28 RX ADMIN — DEXTROSE 2 G: 50 INJECTION, SOLUTION INTRAVENOUS at 07:11

## 2021-11-28 RX ADMIN — SUCCINYLCHOLINE CHLORIDE 120 MG: 20 INJECTION, SOLUTION INTRAMUSCULAR; INTRAVENOUS at 07:11

## 2021-11-28 RX ADMIN — PHENYLEPHRINE HYDROCHLORIDE 100 MCG: 10 INJECTION INTRAVENOUS at 08:11

## 2021-11-28 RX ADMIN — FAMOTIDINE 20 MG: 10 INJECTION, SOLUTION INTRAVENOUS at 07:11

## 2021-11-28 RX ADMIN — ROCURONIUM BROMIDE 5 MG: 10 INJECTION, SOLUTION INTRAVENOUS at 07:11

## 2021-11-28 RX ADMIN — GABAPENTIN 300 MG: 100 CAPSULE ORAL at 07:11

## 2021-11-28 RX ADMIN — LIDOCAINE HYDROCHLORIDE 3 ML: 20 JELLY TOPICAL at 07:11

## 2021-11-28 RX ADMIN — FENTANYL CITRATE 50 MCG: 50 INJECTION INTRAMUSCULAR; INTRAVENOUS at 08:11

## 2021-11-28 RX ADMIN — MORPHINE SULFATE 2 MG: 2 INJECTION, SOLUTION INTRAMUSCULAR; INTRAVENOUS at 10:11

## 2021-11-28 RX ADMIN — DEXTROSE 2 G: 50 INJECTION, SOLUTION INTRAVENOUS at 04:11

## 2021-11-28 RX ADMIN — SODIUM CHLORIDE: 0.9 INJECTION, SOLUTION INTRAVENOUS at 10:11

## 2021-11-28 RX ADMIN — SODIUM CHLORIDE, SODIUM LACTATE, POTASSIUM CHLORIDE, AND CALCIUM CHLORIDE: .6; .31; .03; .02 INJECTION, SOLUTION INTRAVENOUS at 07:11

## 2021-11-28 RX ADMIN — MORPHINE SULFATE 2 MG: 2 INJECTION, SOLUTION INTRAMUSCULAR; INTRAVENOUS at 03:11

## 2021-11-28 RX ADMIN — DIPHENHYDRAMINE HYDROCHLORIDE 6.25 MG: 50 INJECTION, SOLUTION INTRAMUSCULAR; INTRAVENOUS at 08:11

## 2021-11-28 RX ADMIN — SENNOSIDES AND DOCUSATE SODIUM 1 TABLET: 50; 8.6 TABLET ORAL at 11:11

## 2021-11-28 RX ADMIN — PROPOFOL 100 MG: 10 INJECTION, EMULSION INTRAVENOUS at 07:11

## 2021-11-28 RX ADMIN — LIDOCAINE HYDROCHLORIDE 60 MG: 20 INJECTION, SOLUTION INTRAVENOUS at 07:11

## 2021-11-28 RX ADMIN — DEXTROSE 2 G: 50 INJECTION, SOLUTION INTRAVENOUS at 11:11

## 2021-11-28 RX ADMIN — ATORVASTATIN CALCIUM 20 MG: 20 TABLET, FILM COATED ORAL at 11:11

## 2021-11-28 RX ADMIN — SODIUM CHLORIDE: 0.9 INJECTION, SOLUTION INTRAVENOUS at 11:11

## 2021-11-28 RX ADMIN — OXYCODONE HYDROCHLORIDE 5 MG: 5 TABLET ORAL at 09:11

## 2021-11-29 LAB
ANION GAP SERPL CALC-SCNC: 9 MMOL/L (ref 8–16)
BASOPHILS # BLD AUTO: 0.03 K/UL (ref 0–0.2)
BASOPHILS NFR BLD: 0.4 % (ref 0–1.9)
BUN SERPL-MCNC: 24 MG/DL (ref 8–23)
CALCIUM SERPL-MCNC: 8.2 MG/DL (ref 8.7–10.5)
CHLORIDE SERPL-SCNC: 103 MMOL/L (ref 95–110)
CO2 SERPL-SCNC: 24 MMOL/L (ref 23–29)
CREAT SERPL-MCNC: 1 MG/DL (ref 0.5–1.4)
DIFFERENTIAL METHOD: ABNORMAL
EOSINOPHIL # BLD AUTO: 0.1 K/UL (ref 0–0.5)
EOSINOPHIL NFR BLD: 1 % (ref 0–8)
ERYTHROCYTE [DISTWIDTH] IN BLOOD BY AUTOMATED COUNT: 14.2 % (ref 11.5–14.5)
EST. GFR  (AFRICAN AMERICAN): >60 ML/MIN/1.73 M^2
EST. GFR  (NON AFRICAN AMERICAN): 53.3 ML/MIN/1.73 M^2
GLUCOSE SERPL-MCNC: 124 MG/DL (ref 70–110)
GLUCOSE SERPL-MCNC: 131 MG/DL (ref 70–110)
GLUCOSE SERPL-MCNC: 149 MG/DL (ref 70–110)
GLUCOSE SERPL-MCNC: 152 MG/DL (ref 70–110)
HCT VFR BLD AUTO: 26.2 % (ref 37–48.5)
HGB BLD-MCNC: 8.4 G/DL (ref 12–16)
IMM GRANULOCYTES # BLD AUTO: 0.04 K/UL (ref 0–0.04)
IMM GRANULOCYTES NFR BLD AUTO: 0.5 % (ref 0–0.5)
LYMPHOCYTES # BLD AUTO: 1.3 K/UL (ref 1–4.8)
LYMPHOCYTES NFR BLD: 16.6 % (ref 18–48)
MCH RBC QN AUTO: 30.3 PG (ref 27–31)
MCHC RBC AUTO-ENTMCNC: 32.1 G/DL (ref 32–36)
MCV RBC AUTO: 95 FL (ref 82–98)
MONOCYTES # BLD AUTO: 0.7 K/UL (ref 0.3–1)
MONOCYTES NFR BLD: 8.4 % (ref 4–15)
NEUTROPHILS # BLD AUTO: 5.9 K/UL (ref 1.8–7.7)
NEUTROPHILS NFR BLD: 73.1 % (ref 38–73)
NRBC BLD-RTO: 0 /100 WBC
PLATELET # BLD AUTO: 149 K/UL (ref 150–450)
PMV BLD AUTO: 9.9 FL (ref 9.2–12.9)
POTASSIUM SERPL-SCNC: 3.9 MMOL/L (ref 3.5–5.1)
RBC # BLD AUTO: 2.77 M/UL (ref 4–5.4)
SODIUM SERPL-SCNC: 136 MMOL/L (ref 136–145)
WBC # BLD AUTO: 8.07 K/UL (ref 3.9–12.7)

## 2021-11-29 PROCEDURE — 99900035 HC TECH TIME PER 15 MIN (STAT)

## 2021-11-29 PROCEDURE — 63600175 PHARM REV CODE 636 W HCPCS: Performed by: ORTHOPAEDIC SURGERY

## 2021-11-29 PROCEDURE — 27000221 HC OXYGEN, UP TO 24 HOURS

## 2021-11-29 PROCEDURE — 97530 THERAPEUTIC ACTIVITIES: CPT

## 2021-11-29 PROCEDURE — 25000003 PHARM REV CODE 250: Performed by: ORTHOPAEDIC SURGERY

## 2021-11-29 PROCEDURE — 97161 PT EVAL LOW COMPLEX 20 MIN: CPT

## 2021-11-29 PROCEDURE — 36415 COLL VENOUS BLD VENIPUNCTURE: CPT | Performed by: ORTHOPAEDIC SURGERY

## 2021-11-29 PROCEDURE — 85025 COMPLETE CBC W/AUTO DIFF WBC: CPT | Performed by: ORTHOPAEDIC SURGERY

## 2021-11-29 PROCEDURE — 94761 N-INVAS EAR/PLS OXIMETRY MLT: CPT

## 2021-11-29 PROCEDURE — 80048 BASIC METABOLIC PNL TOTAL CA: CPT | Performed by: ORTHOPAEDIC SURGERY

## 2021-11-29 PROCEDURE — 97165 OT EVAL LOW COMPLEX 30 MIN: CPT

## 2021-11-29 PROCEDURE — 12000002 HC ACUTE/MED SURGE SEMI-PRIVATE ROOM

## 2021-11-29 RX ADMIN — HYDROCODONE BITARTRATE AND ACETAMINOPHEN 1 TABLET: 5; 325 TABLET ORAL at 01:11

## 2021-11-29 RX ADMIN — SENNOSIDES AND DOCUSATE SODIUM 1 TABLET: 50; 8.6 TABLET ORAL at 08:11

## 2021-11-29 RX ADMIN — ACETAMINOPHEN 650 MG: 325 TABLET ORAL at 06:11

## 2021-11-29 RX ADMIN — HYDROCODONE BITARTRATE AND ACETAMINOPHEN 1 TABLET: 5; 325 TABLET ORAL at 08:11

## 2021-11-29 RX ADMIN — SENNOSIDES AND DOCUSATE SODIUM 1 TABLET: 50; 8.6 TABLET ORAL at 09:11

## 2021-11-29 RX ADMIN — ENOXAPARIN SODIUM 40 MG: 40 INJECTION SUBCUTANEOUS at 08:11

## 2021-11-29 RX ADMIN — HYDROCODONE BITARTRATE AND ACETAMINOPHEN 1 TABLET: 5; 325 TABLET ORAL at 02:11

## 2021-11-29 RX ADMIN — ATORVASTATIN CALCIUM 20 MG: 20 TABLET, FILM COATED ORAL at 09:11

## 2021-11-29 RX ADMIN — HYDROCODONE BITARTRATE AND ACETAMINOPHEN 1 TABLET: 5; 325 TABLET ORAL at 09:11

## 2021-11-30 PROBLEM — D62 POSTOPERATIVE ANEMIA DUE TO ACUTE BLOOD LOSS: Status: ACTIVE | Noted: 2021-11-30

## 2021-11-30 LAB
ANION GAP SERPL CALC-SCNC: 9 MMOL/L (ref 8–16)
BASOPHILS # BLD AUTO: 0.02 K/UL (ref 0–0.2)
BASOPHILS NFR BLD: 0.3 % (ref 0–1.9)
BLD PROD TYP BPU: NORMAL
BLOOD UNIT EXPIRATION DATE: NORMAL
BLOOD UNIT TYPE CODE: 6200
BLOOD UNIT TYPE: NORMAL
BUN SERPL-MCNC: 21 MG/DL (ref 8–23)
CALCIUM SERPL-MCNC: 8 MG/DL (ref 8.7–10.5)
CHLORIDE SERPL-SCNC: 106 MMOL/L (ref 95–110)
CO2 SERPL-SCNC: 24 MMOL/L (ref 23–29)
CODING SYSTEM: NORMAL
CREAT SERPL-MCNC: 1.1 MG/DL (ref 0.5–1.4)
DIFFERENTIAL METHOD: ABNORMAL
DISPENSE STATUS: NORMAL
EOSINOPHIL # BLD AUTO: 0.2 K/UL (ref 0–0.5)
EOSINOPHIL NFR BLD: 3.4 % (ref 0–8)
ERYTHROCYTE [DISTWIDTH] IN BLOOD BY AUTOMATED COUNT: 14.5 % (ref 11.5–14.5)
EST. GFR  (AFRICAN AMERICAN): 54.8 ML/MIN/1.73 M^2
EST. GFR  (NON AFRICAN AMERICAN): 47.5 ML/MIN/1.73 M^2
GLUCOSE SERPL-MCNC: 123 MG/DL (ref 70–110)
GLUCOSE SERPL-MCNC: 123 MG/DL (ref 70–110)
GLUCOSE SERPL-MCNC: 169 MG/DL (ref 70–110)
GLUCOSE SERPL-MCNC: 192 MG/DL (ref 70–110)
HCT VFR BLD AUTO: 22.8 % (ref 37–48.5)
HGB BLD-MCNC: 7.3 G/DL (ref 12–16)
IMM GRANULOCYTES # BLD AUTO: 0.03 K/UL (ref 0–0.04)
IMM GRANULOCYTES NFR BLD AUTO: 0.4 % (ref 0–0.5)
LYMPHOCYTES # BLD AUTO: 1.1 K/UL (ref 1–4.8)
LYMPHOCYTES NFR BLD: 16.6 % (ref 18–48)
MCH RBC QN AUTO: 30.2 PG (ref 27–31)
MCHC RBC AUTO-ENTMCNC: 32 G/DL (ref 32–36)
MCV RBC AUTO: 94 FL (ref 82–98)
MONOCYTES # BLD AUTO: 0.6 K/UL (ref 0.3–1)
MONOCYTES NFR BLD: 9.1 % (ref 4–15)
NEUTROPHILS # BLD AUTO: 4.7 K/UL (ref 1.8–7.7)
NEUTROPHILS NFR BLD: 70.2 % (ref 38–73)
NRBC BLD-RTO: 0 /100 WBC
NUM UNITS TRANS PACKED RBC: NORMAL
PLATELET # BLD AUTO: 130 K/UL (ref 150–450)
PMV BLD AUTO: 10.1 FL (ref 9.2–12.9)
POTASSIUM SERPL-SCNC: 3.5 MMOL/L (ref 3.5–5.1)
RBC # BLD AUTO: 2.42 M/UL (ref 4–5.4)
SODIUM SERPL-SCNC: 139 MMOL/L (ref 136–145)
WBC # BLD AUTO: 6.67 K/UL (ref 3.9–12.7)

## 2021-11-30 PROCEDURE — 85025 COMPLETE CBC W/AUTO DIFF WBC: CPT | Performed by: ORTHOPAEDIC SURGERY

## 2021-11-30 PROCEDURE — 63600175 PHARM REV CODE 636 W HCPCS: Performed by: ORTHOPAEDIC SURGERY

## 2021-11-30 PROCEDURE — 97530 THERAPEUTIC ACTIVITIES: CPT

## 2021-11-30 PROCEDURE — 25000003 PHARM REV CODE 250: Performed by: ORTHOPAEDIC SURGERY

## 2021-11-30 PROCEDURE — 27000221 HC OXYGEN, UP TO 24 HOURS

## 2021-11-30 PROCEDURE — 94799 UNLISTED PULMONARY SVC/PX: CPT

## 2021-11-30 PROCEDURE — 94761 N-INVAS EAR/PLS OXIMETRY MLT: CPT

## 2021-11-30 PROCEDURE — 80048 BASIC METABOLIC PNL TOTAL CA: CPT | Performed by: ORTHOPAEDIC SURGERY

## 2021-11-30 PROCEDURE — 99900035 HC TECH TIME PER 15 MIN (STAT)

## 2021-11-30 PROCEDURE — 63600175 PHARM REV CODE 636 W HCPCS: Performed by: INTERNAL MEDICINE

## 2021-11-30 PROCEDURE — 97110 THERAPEUTIC EXERCISES: CPT

## 2021-11-30 PROCEDURE — 25000003 PHARM REV CODE 250: Performed by: INTERNAL MEDICINE

## 2021-11-30 PROCEDURE — 36415 COLL VENOUS BLD VENIPUNCTURE: CPT | Performed by: ORTHOPAEDIC SURGERY

## 2021-11-30 PROCEDURE — 12000002 HC ACUTE/MED SURGE SEMI-PRIVATE ROOM

## 2021-11-30 PROCEDURE — P9016 RBC LEUKOCYTES REDUCED: HCPCS | Performed by: INTERNAL MEDICINE

## 2021-11-30 RX ORDER — HYDROCHLOROTHIAZIDE 25 MG/1
25 TABLET ORAL DAILY
Status: DISCONTINUED | OUTPATIENT
Start: 2021-11-30 | End: 2021-12-02 | Stop reason: HOSPADM

## 2021-11-30 RX ORDER — FUROSEMIDE 10 MG/ML
20 INJECTION INTRAMUSCULAR; INTRAVENOUS ONCE
Status: COMPLETED | OUTPATIENT
Start: 2021-11-30 | End: 2021-11-30

## 2021-11-30 RX ORDER — FAMOTIDINE 20 MG/1
20 TABLET, FILM COATED ORAL DAILY
Status: DISCONTINUED | OUTPATIENT
Start: 2021-11-30 | End: 2021-12-02 | Stop reason: HOSPADM

## 2021-11-30 RX ORDER — HYDROCODONE BITARTRATE AND ACETAMINOPHEN 500; 5 MG/1; MG/1
TABLET ORAL
Status: DISCONTINUED | OUTPATIENT
Start: 2021-11-30 | End: 2021-12-02 | Stop reason: HOSPADM

## 2021-11-30 RX ADMIN — HYDROCHLOROTHIAZIDE 25 MG: 25 TABLET ORAL at 09:11

## 2021-11-30 RX ADMIN — HYDROCODONE BITARTRATE AND ACETAMINOPHEN 1 TABLET: 5; 325 TABLET ORAL at 02:11

## 2021-11-30 RX ADMIN — ONDANSETRON 4 MG: 2 INJECTION INTRAMUSCULAR; INTRAVENOUS at 02:11

## 2021-11-30 RX ADMIN — FUROSEMIDE 20 MG: 10 INJECTION, SOLUTION INTRAVENOUS at 04:11

## 2021-11-30 RX ADMIN — HYDROCODONE BITARTRATE AND ACETAMINOPHEN 1 TABLET: 5; 325 TABLET ORAL at 08:11

## 2021-11-30 RX ADMIN — SENNOSIDES AND DOCUSATE SODIUM 1 TABLET: 50; 8.6 TABLET ORAL at 09:11

## 2021-11-30 RX ADMIN — ATORVASTATIN CALCIUM 20 MG: 20 TABLET, FILM COATED ORAL at 08:11

## 2021-11-30 RX ADMIN — HYDROCODONE BITARTRATE AND ACETAMINOPHEN 1 TABLET: 5; 325 TABLET ORAL at 10:11

## 2021-11-30 RX ADMIN — FAMOTIDINE 20 MG: 20 TABLET, FILM COATED ORAL at 09:11

## 2021-11-30 RX ADMIN — MELATONIN 6 MG: at 12:11

## 2021-11-30 RX ADMIN — MELATONIN 6 MG: at 08:11

## 2021-11-30 RX ADMIN — SENNOSIDES AND DOCUSATE SODIUM 1 TABLET: 50; 8.6 TABLET ORAL at 08:11

## 2021-11-30 RX ADMIN — ENOXAPARIN SODIUM 40 MG: 40 INJECTION SUBCUTANEOUS at 09:11

## 2021-11-30 RX ADMIN — ACETAMINOPHEN 650 MG: 325 TABLET ORAL at 01:11

## 2021-12-01 LAB
ANION GAP SERPL CALC-SCNC: 8 MMOL/L (ref 8–16)
BASOPHILS # BLD AUTO: 0.04 K/UL (ref 0–0.2)
BASOPHILS NFR BLD: 0.5 % (ref 0–1.9)
BUN SERPL-MCNC: 20 MG/DL (ref 8–23)
CALCIUM SERPL-MCNC: 8.5 MG/DL (ref 8.7–10.5)
CHLORIDE SERPL-SCNC: 102 MMOL/L (ref 95–110)
CO2 SERPL-SCNC: 29 MMOL/L (ref 23–29)
CREAT SERPL-MCNC: 1 MG/DL (ref 0.5–1.4)
DIFFERENTIAL METHOD: ABNORMAL
EOSINOPHIL # BLD AUTO: 0.2 K/UL (ref 0–0.5)
EOSINOPHIL NFR BLD: 2.1 % (ref 0–8)
ERYTHROCYTE [DISTWIDTH] IN BLOOD BY AUTOMATED COUNT: 14.4 % (ref 11.5–14.5)
EST. GFR  (AFRICAN AMERICAN): >60 ML/MIN/1.73 M^2
EST. GFR  (NON AFRICAN AMERICAN): 53.3 ML/MIN/1.73 M^2
GLUCOSE SERPL-MCNC: 125 MG/DL (ref 70–110)
HCT VFR BLD AUTO: 28.1 % (ref 37–48.5)
HGB BLD-MCNC: 9.2 G/DL (ref 12–16)
IMM GRANULOCYTES # BLD AUTO: 0.04 K/UL (ref 0–0.04)
IMM GRANULOCYTES NFR BLD AUTO: 0.5 % (ref 0–0.5)
LYMPHOCYTES # BLD AUTO: 0.9 K/UL (ref 1–4.8)
LYMPHOCYTES NFR BLD: 12.6 % (ref 18–48)
MCH RBC QN AUTO: 29.7 PG (ref 27–31)
MCHC RBC AUTO-ENTMCNC: 32.7 G/DL (ref 32–36)
MCV RBC AUTO: 91 FL (ref 82–98)
MONOCYTES # BLD AUTO: 0.7 K/UL (ref 0.3–1)
MONOCYTES NFR BLD: 9.5 % (ref 4–15)
NEUTROPHILS # BLD AUTO: 5.6 K/UL (ref 1.8–7.7)
NEUTROPHILS NFR BLD: 74.8 % (ref 38–73)
NRBC BLD-RTO: 0 /100 WBC
PLATELET # BLD AUTO: 141 K/UL (ref 150–450)
PMV BLD AUTO: 10 FL (ref 9.2–12.9)
POTASSIUM SERPL-SCNC: 3.5 MMOL/L (ref 3.5–5.1)
RBC # BLD AUTO: 3.1 M/UL (ref 4–5.4)
SODIUM SERPL-SCNC: 139 MMOL/L (ref 136–145)
WBC # BLD AUTO: 7.48 K/UL (ref 3.9–12.7)

## 2021-12-01 PROCEDURE — 12000002 HC ACUTE/MED SURGE SEMI-PRIVATE ROOM

## 2021-12-01 PROCEDURE — 97535 SELF CARE MNGMENT TRAINING: CPT

## 2021-12-01 PROCEDURE — 25000003 PHARM REV CODE 250: Performed by: ORTHOPAEDIC SURGERY

## 2021-12-01 PROCEDURE — 80048 BASIC METABOLIC PNL TOTAL CA: CPT | Performed by: ORTHOPAEDIC SURGERY

## 2021-12-01 PROCEDURE — 97530 THERAPEUTIC ACTIVITIES: CPT

## 2021-12-01 PROCEDURE — 63600175 PHARM REV CODE 636 W HCPCS: Performed by: ORTHOPAEDIC SURGERY

## 2021-12-01 PROCEDURE — 25000003 PHARM REV CODE 250: Performed by: INTERNAL MEDICINE

## 2021-12-01 PROCEDURE — 36415 COLL VENOUS BLD VENIPUNCTURE: CPT | Performed by: ORTHOPAEDIC SURGERY

## 2021-12-01 PROCEDURE — 85025 COMPLETE CBC W/AUTO DIFF WBC: CPT | Performed by: ORTHOPAEDIC SURGERY

## 2021-12-01 RX ORDER — SYRING-NEEDL,DISP,INSUL,0.3 ML 29 G X1/2"
296 SYRINGE, EMPTY DISPOSABLE MISCELLANEOUS ONCE
Status: DISCONTINUED | OUTPATIENT
Start: 2021-12-01 | End: 2021-12-02 | Stop reason: HOSPADM

## 2021-12-01 RX ADMIN — HYDROCODONE BITARTRATE AND ACETAMINOPHEN 1 TABLET: 5; 325 TABLET ORAL at 10:12

## 2021-12-01 RX ADMIN — ENOXAPARIN SODIUM 40 MG: 40 INJECTION SUBCUTANEOUS at 09:12

## 2021-12-01 RX ADMIN — SENNOSIDES AND DOCUSATE SODIUM 1 TABLET: 50; 8.6 TABLET ORAL at 09:12

## 2021-12-01 RX ADMIN — POLYETHYLENE GLYCOL 3350 17 G: 17 POWDER, FOR SOLUTION ORAL at 09:12

## 2021-12-01 RX ADMIN — HYDROCHLOROTHIAZIDE 25 MG: 25 TABLET ORAL at 09:12

## 2021-12-01 RX ADMIN — FAMOTIDINE 20 MG: 20 TABLET, FILM COATED ORAL at 09:12

## 2021-12-01 RX ADMIN — HYDROCODONE BITARTRATE AND ACETAMINOPHEN 1 TABLET: 5; 325 TABLET ORAL at 09:12

## 2021-12-01 RX ADMIN — HYDROCODONE BITARTRATE AND ACETAMINOPHEN 1 TABLET: 5; 325 TABLET ORAL at 12:12

## 2021-12-01 RX ADMIN — SENNOSIDES AND DOCUSATE SODIUM 1 TABLET: 50; 8.6 TABLET ORAL at 08:12

## 2021-12-01 RX ADMIN — ATORVASTATIN CALCIUM 20 MG: 20 TABLET, FILM COATED ORAL at 08:12

## 2021-12-01 RX ADMIN — ONDANSETRON 4 MG: 2 INJECTION INTRAMUSCULAR; INTRAVENOUS at 09:12

## 2021-12-01 RX ADMIN — MELATONIN 6 MG: at 08:12

## 2021-12-01 RX ADMIN — ONDANSETRON 4 MG: 2 INJECTION INTRAMUSCULAR; INTRAVENOUS at 02:12

## 2021-12-02 VITALS
RESPIRATION RATE: 18 BRPM | OXYGEN SATURATION: 93 % | TEMPERATURE: 98 F | HEIGHT: 63 IN | WEIGHT: 146.63 LBS | DIASTOLIC BLOOD PRESSURE: 60 MMHG | BODY MASS INDEX: 25.98 KG/M2 | SYSTOLIC BLOOD PRESSURE: 118 MMHG | HEART RATE: 84 BPM

## 2021-12-02 PROBLEM — D62 POSTOPERATIVE ANEMIA DUE TO ACUTE BLOOD LOSS: Status: RESOLVED | Noted: 2021-11-30 | Resolved: 2021-12-02

## 2021-12-02 PROBLEM — S72.001A CLOSED RIGHT HIP FRACTURE: Status: RESOLVED | Noted: 2021-11-27 | Resolved: 2021-12-02

## 2021-12-02 LAB
ANION GAP SERPL CALC-SCNC: 9 MMOL/L (ref 8–16)
BASOPHILS # BLD AUTO: 0.02 K/UL (ref 0–0.2)
BASOPHILS NFR BLD: 0.3 % (ref 0–1.9)
BUN SERPL-MCNC: 23 MG/DL (ref 8–23)
CALCIUM SERPL-MCNC: 8.6 MG/DL (ref 8.7–10.5)
CHLORIDE SERPL-SCNC: 99 MMOL/L (ref 95–110)
CO2 SERPL-SCNC: 30 MMOL/L (ref 23–29)
CREAT SERPL-MCNC: 1.1 MG/DL (ref 0.5–1.4)
DIFFERENTIAL METHOD: ABNORMAL
EOSINOPHIL # BLD AUTO: 0.2 K/UL (ref 0–0.5)
EOSINOPHIL NFR BLD: 2.3 % (ref 0–8)
ERYTHROCYTE [DISTWIDTH] IN BLOOD BY AUTOMATED COUNT: 14.2 % (ref 11.5–14.5)
EST. GFR  (AFRICAN AMERICAN): 54.8 ML/MIN/1.73 M^2
EST. GFR  (NON AFRICAN AMERICAN): 47.5 ML/MIN/1.73 M^2
GLUCOSE SERPL-MCNC: 129 MG/DL (ref 70–110)
GLUCOSE SERPL-MCNC: 139 MG/DL (ref 70–110)
GLUCOSE SERPL-MCNC: 174 MG/DL (ref 70–110)
HCT VFR BLD AUTO: 26.9 % (ref 37–48.5)
HGB BLD-MCNC: 8.9 G/DL (ref 12–16)
IMM GRANULOCYTES # BLD AUTO: 0.02 K/UL (ref 0–0.04)
IMM GRANULOCYTES NFR BLD AUTO: 0.3 % (ref 0–0.5)
LYMPHOCYTES # BLD AUTO: 1 K/UL (ref 1–4.8)
LYMPHOCYTES NFR BLD: 14.7 % (ref 18–48)
MCH RBC QN AUTO: 30.2 PG (ref 27–31)
MCHC RBC AUTO-ENTMCNC: 33.1 G/DL (ref 32–36)
MCV RBC AUTO: 91 FL (ref 82–98)
MONOCYTES # BLD AUTO: 0.7 K/UL (ref 0.3–1)
MONOCYTES NFR BLD: 10.5 % (ref 4–15)
NEUTROPHILS # BLD AUTO: 4.6 K/UL (ref 1.8–7.7)
NEUTROPHILS NFR BLD: 71.9 % (ref 38–73)
NRBC BLD-RTO: 0 /100 WBC
PLATELET # BLD AUTO: 146 K/UL (ref 150–450)
PMV BLD AUTO: 9.4 FL (ref 9.2–12.9)
POTASSIUM SERPL-SCNC: 3.4 MMOL/L (ref 3.5–5.1)
RBC # BLD AUTO: 2.95 M/UL (ref 4–5.4)
SODIUM SERPL-SCNC: 138 MMOL/L (ref 136–145)
WBC # BLD AUTO: 6.45 K/UL (ref 3.9–12.7)

## 2021-12-02 PROCEDURE — 85025 COMPLETE CBC W/AUTO DIFF WBC: CPT | Performed by: ORTHOPAEDIC SURGERY

## 2021-12-02 PROCEDURE — 97116 GAIT TRAINING THERAPY: CPT

## 2021-12-02 PROCEDURE — 25000003 PHARM REV CODE 250: Performed by: ORTHOPAEDIC SURGERY

## 2021-12-02 PROCEDURE — 80048 BASIC METABOLIC PNL TOTAL CA: CPT | Performed by: ORTHOPAEDIC SURGERY

## 2021-12-02 PROCEDURE — 25000003 PHARM REV CODE 250: Performed by: INTERNAL MEDICINE

## 2021-12-02 PROCEDURE — 63600175 PHARM REV CODE 636 W HCPCS: Performed by: ORTHOPAEDIC SURGERY

## 2021-12-02 PROCEDURE — 36415 COLL VENOUS BLD VENIPUNCTURE: CPT | Performed by: ORTHOPAEDIC SURGERY

## 2021-12-02 PROCEDURE — 97535 SELF CARE MNGMENT TRAINING: CPT

## 2021-12-02 RX ORDER — HYDROCODONE BITARTRATE AND ACETAMINOPHEN 5; 325 MG/1; MG/1
1 TABLET ORAL EVERY 12 HOURS PRN
Refills: 0
Start: 2021-12-02 | End: 2021-12-20 | Stop reason: SDUPTHER

## 2021-12-02 RX ADMIN — FAMOTIDINE 20 MG: 20 TABLET, FILM COATED ORAL at 10:12

## 2021-12-02 RX ADMIN — HYDROCODONE BITARTRATE AND ACETAMINOPHEN 1 TABLET: 5; 325 TABLET ORAL at 02:12

## 2021-12-02 RX ADMIN — POTASSIUM CHLORIDE 40 MEQ: 20 TABLET, EXTENDED RELEASE ORAL at 06:12

## 2021-12-02 RX ADMIN — ENOXAPARIN SODIUM 40 MG: 40 INJECTION SUBCUTANEOUS at 10:12

## 2021-12-02 RX ADMIN — HYDROCODONE BITARTRATE AND ACETAMINOPHEN 1 TABLET: 5; 325 TABLET ORAL at 03:12

## 2021-12-02 RX ADMIN — HYDROCHLOROTHIAZIDE 25 MG: 25 TABLET ORAL at 10:12

## 2021-12-02 RX ADMIN — SENNOSIDES AND DOCUSATE SODIUM 1 TABLET: 50; 8.6 TABLET ORAL at 10:12

## 2021-12-02 RX ADMIN — HYDROCODONE BITARTRATE AND ACETAMINOPHEN 1 TABLET: 5; 325 TABLET ORAL at 11:12

## 2021-12-06 ENCOUNTER — HOSPITAL ENCOUNTER (OUTPATIENT)
Dept: RADIOLOGY | Facility: HOSPITAL | Age: 81
Discharge: HOME | End: 2021-12-06
Attending: STUDENT IN AN ORGANIZED HEALTH CARE EDUCATION/TRAINING PROGRAM
Payer: MEDICARE

## 2021-12-06 PROCEDURE — 73501 X-RAY EXAM HIP UNI 1 VIEW: CPT | Mod: 26,RT,, | Performed by: RADIOLOGY

## 2021-12-06 PROCEDURE — 73501 XR HIP WITH PELVIS WHEN PERFORMED, 1 VIEW RIGHT: ICD-10-PCS | Mod: 26,RT,, | Performed by: RADIOLOGY

## 2021-12-13 PROCEDURE — G0180 PR HOME HEALTH MD CERTIFICATION: ICD-10-PCS | Mod: ,,, | Performed by: STUDENT IN AN ORGANIZED HEALTH CARE EDUCATION/TRAINING PROGRAM

## 2021-12-13 PROCEDURE — G0180 MD CERTIFICATION HHA PATIENT: HCPCS | Mod: ,,, | Performed by: STUDENT IN AN ORGANIZED HEALTH CARE EDUCATION/TRAINING PROGRAM

## 2021-12-17 ENCOUNTER — TELEPHONE (OUTPATIENT)
Dept: FAMILY MEDICINE | Facility: CLINIC | Age: 81
End: 2021-12-17
Payer: MEDICARE

## 2021-12-17 ENCOUNTER — EXTERNAL HOME HEALTH (OUTPATIENT)
Dept: HOME HEALTH SERVICES | Facility: HOSPITAL | Age: 81
End: 2021-12-17
Payer: MEDICARE

## 2021-12-20 DIAGNOSIS — M25.551 RIGHT HIP PAIN: Primary | ICD-10-CM

## 2021-12-20 RX ORDER — CYCLOBENZAPRINE HCL 5 MG
5 TABLET ORAL 3 TIMES DAILY PRN
Qty: 30 TABLET | Refills: 0 | Status: SHIPPED | OUTPATIENT
Start: 2021-12-20 | End: 2021-12-21 | Stop reason: SDUPTHER

## 2021-12-20 RX ORDER — HYDROCODONE BITARTRATE AND ACETAMINOPHEN 5; 325 MG/1; MG/1
1 TABLET ORAL EVERY 12 HOURS PRN
Qty: 24 TABLET | Refills: 0 | Status: SHIPPED | OUTPATIENT
Start: 2021-12-20 | End: 2022-03-21

## 2021-12-21 ENCOUNTER — OFFICE VISIT (OUTPATIENT)
Dept: FAMILY MEDICINE | Facility: CLINIC | Age: 81
End: 2021-12-21
Payer: MEDICARE

## 2021-12-21 VITALS
HEART RATE: 100 BPM | DIASTOLIC BLOOD PRESSURE: 60 MMHG | BODY MASS INDEX: 23.79 KG/M2 | WEIGHT: 134.25 LBS | HEIGHT: 63 IN | SYSTOLIC BLOOD PRESSURE: 120 MMHG | TEMPERATURE: 98 F | OXYGEN SATURATION: 98 % | RESPIRATION RATE: 16 BRPM

## 2021-12-21 DIAGNOSIS — M25.559 HIP PAIN: Primary | ICD-10-CM

## 2021-12-21 DIAGNOSIS — I10 PRIMARY HYPERTENSION: ICD-10-CM

## 2021-12-21 DIAGNOSIS — S72.001D CLOSED FRACTURE OF RIGHT HIP WITH ROUTINE HEALING, SUBSEQUENT ENCOUNTER: Primary | ICD-10-CM

## 2021-12-21 DIAGNOSIS — I10 ESSENTIAL HYPERTENSION: ICD-10-CM

## 2021-12-21 DIAGNOSIS — M25.551 RIGHT HIP PAIN: ICD-10-CM

## 2021-12-21 PROCEDURE — 99213 PR OFFICE/OUTPT VISIT, EST, LEVL III, 20-29 MIN: ICD-10-PCS | Mod: S$GLB,,, | Performed by: FAMILY MEDICINE

## 2021-12-21 PROCEDURE — 99213 OFFICE O/P EST LOW 20 MIN: CPT | Mod: S$GLB,,, | Performed by: FAMILY MEDICINE

## 2021-12-21 PROCEDURE — 99999 PR PBB SHADOW E&M-EST. PATIENT-LVL IV: CPT | Mod: PBBFAC,,, | Performed by: FAMILY MEDICINE

## 2021-12-21 PROCEDURE — 99999 PR PBB SHADOW E&M-EST. PATIENT-LVL IV: ICD-10-PCS | Mod: PBBFAC,,, | Performed by: FAMILY MEDICINE

## 2021-12-21 PROCEDURE — 1157F PR ADVANCE CARE PLAN OR EQUIV PRESENT IN MEDICAL RECORD: ICD-10-PCS | Mod: CPTII,S$GLB,, | Performed by: FAMILY MEDICINE

## 2021-12-21 PROCEDURE — 1157F ADVNC CARE PLAN IN RCRD: CPT | Mod: CPTII,S$GLB,, | Performed by: FAMILY MEDICINE

## 2021-12-21 RX ORDER — BENAZEPRIL HYDROCHLORIDE 40 MG/1
40 TABLET ORAL DAILY
Qty: 90 TABLET | Refills: 3 | Status: CANCELLED | OUTPATIENT
Start: 2021-12-21

## 2021-12-21 RX ORDER — CYCLOBENZAPRINE HCL 5 MG
5 TABLET ORAL 3 TIMES DAILY PRN
Qty: 30 TABLET | Refills: 0 | Status: SHIPPED | OUTPATIENT
Start: 2021-12-21 | End: 2021-12-27 | Stop reason: ALTCHOICE

## 2021-12-22 ENCOUNTER — TELEPHONE (OUTPATIENT)
Dept: ORTHOPEDICS | Facility: CLINIC | Age: 81
End: 2021-12-22
Payer: MEDICARE

## 2021-12-27 ENCOUNTER — HOSPITAL ENCOUNTER (OUTPATIENT)
Dept: RADIOLOGY | Facility: HOSPITAL | Age: 81
Discharge: HOME | End: 2021-12-27
Attending: ORTHOPAEDIC SURGERY
Payer: MEDICARE

## 2021-12-27 ENCOUNTER — OFFICE VISIT (OUTPATIENT)
Dept: ORTHOPEDICS | Facility: CLINIC | Age: 81
End: 2021-12-27
Payer: MEDICARE

## 2021-12-27 VITALS — WEIGHT: 134 LBS | RESPIRATION RATE: 18 BRPM | HEIGHT: 63 IN | BODY MASS INDEX: 23.74 KG/M2

## 2021-12-27 DIAGNOSIS — S72.141D CLOSED FRACTURE OF FEMUR, INTERTROCHANTERIC, RIGHT, WITH ROUTINE HEALING, SUBSEQUENT ENCOUNTER: Primary | ICD-10-CM

## 2021-12-27 DIAGNOSIS — M25.559 HIP PAIN: ICD-10-CM

## 2021-12-27 PROCEDURE — 99024 POSTOP FOLLOW-UP VISIT: CPT | Mod: S$GLB,,, | Performed by: ORTHOPAEDIC SURGERY

## 2021-12-27 PROCEDURE — 99999 PR PBB SHADOW E&M-EST. PATIENT-LVL III: ICD-10-PCS | Mod: PBBFAC,,, | Performed by: ORTHOPAEDIC SURGERY

## 2021-12-27 PROCEDURE — 1125F AMNT PAIN NOTED PAIN PRSNT: CPT | Mod: CPTII,S$GLB,, | Performed by: ORTHOPAEDIC SURGERY

## 2021-12-27 PROCEDURE — 1157F PR ADVANCE CARE PLAN OR EQUIV PRESENT IN MEDICAL RECORD: ICD-10-PCS | Mod: CPTII,S$GLB,, | Performed by: ORTHOPAEDIC SURGERY

## 2021-12-27 PROCEDURE — 1101F PR PT FALLS ASSESS DOC 0-1 FALLS W/OUT INJ PAST YR: ICD-10-PCS | Mod: CPTII,S$GLB,, | Performed by: ORTHOPAEDIC SURGERY

## 2021-12-27 PROCEDURE — 1159F PR MEDICATION LIST DOCUMENTED IN MEDICAL RECORD: ICD-10-PCS | Mod: CPTII,S$GLB,, | Performed by: ORTHOPAEDIC SURGERY

## 2021-12-27 PROCEDURE — 73502 X-RAY EXAM HIP UNI 2-3 VIEWS: CPT | Mod: 26,RT,, | Performed by: RADIOLOGY

## 2021-12-27 PROCEDURE — 99999 PR PBB SHADOW E&M-EST. PATIENT-LVL III: CPT | Mod: PBBFAC,,, | Performed by: ORTHOPAEDIC SURGERY

## 2021-12-27 PROCEDURE — 73502 XR HIP WITH PELVIS WHEN PERFORMED, 2 OR 3  VIEWS RIGHT: ICD-10-PCS | Mod: 26,RT,, | Performed by: RADIOLOGY

## 2021-12-27 PROCEDURE — 1157F ADVNC CARE PLAN IN RCRD: CPT | Mod: CPTII,S$GLB,, | Performed by: ORTHOPAEDIC SURGERY

## 2021-12-27 PROCEDURE — 1125F PR PAIN SEVERITY QUANTIFIED, PAIN PRESENT: ICD-10-PCS | Mod: CPTII,S$GLB,, | Performed by: ORTHOPAEDIC SURGERY

## 2021-12-27 PROCEDURE — 73502 X-RAY EXAM HIP UNI 2-3 VIEWS: CPT | Mod: TC,PN,RT

## 2021-12-27 PROCEDURE — 3288F FALL RISK ASSESSMENT DOCD: CPT | Mod: CPTII,S$GLB,, | Performed by: ORTHOPAEDIC SURGERY

## 2021-12-27 PROCEDURE — 1159F MED LIST DOCD IN RCRD: CPT | Mod: CPTII,S$GLB,, | Performed by: ORTHOPAEDIC SURGERY

## 2021-12-27 PROCEDURE — 1101F PT FALLS ASSESS-DOCD LE1/YR: CPT | Mod: CPTII,S$GLB,, | Performed by: ORTHOPAEDIC SURGERY

## 2021-12-27 PROCEDURE — 3288F PR FALLS RISK ASSESSMENT DOCUMENTED: ICD-10-PCS | Mod: CPTII,S$GLB,, | Performed by: ORTHOPAEDIC SURGERY

## 2021-12-27 PROCEDURE — 99024 PR POST-OP FOLLOW-UP VISIT: ICD-10-PCS | Mod: S$GLB,,, | Performed by: ORTHOPAEDIC SURGERY

## 2021-12-27 RX ORDER — TIZANIDINE 2 MG/1
4 TABLET ORAL EVERY 6 HOURS PRN
Qty: 30 TABLET | Refills: 0 | Status: SHIPPED | OUTPATIENT
Start: 2021-12-27 | End: 2021-12-28

## 2021-12-27 RX ORDER — METHOCARBAMOL 500 MG/1
500 TABLET, FILM COATED ORAL 4 TIMES DAILY
Qty: 40 TABLET | Refills: 0 | Status: CANCELLED | OUTPATIENT
Start: 2021-12-27 | End: 2022-01-06

## 2021-12-28 RX ORDER — OMEPRAZOLE 40 MG/1
CAPSULE, DELAYED RELEASE ORAL
Qty: 90 CAPSULE | Refills: 3 | Status: SHIPPED | OUTPATIENT
Start: 2021-12-28 | End: 2022-12-29

## 2021-12-30 RX ORDER — SIMVASTATIN 40 MG/1
TABLET, FILM COATED ORAL
Qty: 90 TABLET | Refills: 3 | Status: SHIPPED | OUTPATIENT
Start: 2021-12-30 | End: 2023-01-28

## 2022-01-01 ENCOUNTER — HOSPITAL ENCOUNTER (EMERGENCY)
Facility: HOSPITAL | Age: 82
Discharge: HOME | End: 2022-01-01
Attending: EMERGENCY MEDICINE
Payer: MEDICARE

## 2022-01-01 VITALS
HEIGHT: 63 IN | RESPIRATION RATE: 18 BRPM | DIASTOLIC BLOOD PRESSURE: 92 MMHG | TEMPERATURE: 98 F | OXYGEN SATURATION: 99 % | HEART RATE: 99 BPM | WEIGHT: 135 LBS | SYSTOLIC BLOOD PRESSURE: 185 MMHG | BODY MASS INDEX: 23.92 KG/M2

## 2022-01-01 DIAGNOSIS — Z20.822 CLOSE EXPOSURE TO COVID-19 VIRUS: Primary | ICD-10-CM

## 2022-01-01 LAB — SARS-COV-2 RDRP RESP QL NAA+PROBE: NEGATIVE

## 2022-01-01 PROCEDURE — 99281 EMR DPT VST MAYX REQ PHY/QHP: CPT

## 2022-01-01 PROCEDURE — U0002 COVID-19 LAB TEST NON-CDC: HCPCS | Performed by: NURSE PRACTITIONER

## 2022-01-01 NOTE — ED PROVIDER NOTES
Encounter Date: 1/1/2022       History     Chief Complaint   Patient presents with    COVID-19 Concerns     EXPOSURE     Phone consult only:    81-year-old female with history of gastroesophageal reflux, hypertension, hyperlipidemia, type 2 diabetes, macular degeneration.  Patient status post ORIF right hip secondary to hip replacement.  Patient was seen emergency department earlier today 2nd to the fact that her  came in with low blood sugar in tested positive for COVID.  At this time patient was seeking a COVID test due to significant contact exposure.  Patient did receive her results per my chart and left prior to being seen by time medical providers.  Patient was phone called and per review of systems patient denied shortness of breath, no chest pain, no nausea, vomiting, no fever.  No other constitutional symptoms.        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;  Service: Endoscopy;  Laterality: N/A;    HYSTERECTOMY      partial    INSERTION OF TUNNELED CENTRAL VENOUS CATHETER (CVC) WITH SUBCUTANEOUS PORT Right 10/19/2020    Procedure: INSERTION, PORT-A-CATH;  Surgeon: Misti Mcfarland MD;  Location[facility];  Service: General;  Laterality: Right;    INTRAMEDULLARY RODDING OF TROCHANTER OF FEMUR Right 11/28/2021    Procedure: INSERTION,  INTRAMEDULLARY LUC, FEMUR, TROCHANTER/RIGHT TFN DR FAJARDO NOTIFIED REP;  Surgeon: Kp Fajardo MD;  [facility];  Service: Orthopedics;  Laterality: Right;  SKIP    ROBOT-ASSISTED LAPAROSCOPIC LYMPHADENECTOMY USING DA NELLA XI N/A 9/21/2020    Procedure: XI ROBOTIC LYMPHADENECTOMY-pelvic and kell-aortic;  Surgeon: Altagracia Ray MD;  [email or web address] OR;  Service: OB/GYN;  Laterality: N/A;    ROBOT-ASSISTED LAPAROSCOPIC OMENTECTOMY USING DA NELLA XI N/A 9/21/2020    Procedure: XI ROBOTIC OMENTECTOMY;  Surgeon: Altagracia Ray MD;  Location: [email or web address] OR;  Service: OB/GYN;  Laterality: N/A;    ROBOT-ASSISTED LAPAROSCOPIC SALPINGO-OOPHORECTOMY USING DA NELLA XI Bilateral 9/21/2020    Procedure: XI ROBOTIC SALPINGO-OOPHORECTOMY;  Surgeon: Altagracia Ray MD;  Location: [email or web address] OR;  Service: OB/GYN;  Laterality: Bilateral;     Family History   Problem Relation Age of Onset    Cancer Mother 57        lung    Cancer Father 56        oral    Skin cancer Sister     Skin cancer Brother     Melanoma Brother     Skin cancer Brother     Breast cancer Paternal Grandmother     Psoriasis Neg Hx     Lupus Neg Hx     Eczema Neg Hx      Social History     Tobacco Use    Smoking status: Former Smoker     Packs/day: 1.00     Years: 20.00     Pack years: 20.00     Types: Cigarettes    Smokeless tobacco: Never Used    Tobacco comment: quit 40 yrs ago   Substance Use Topics    Alcohol use: Yes     Alcohol/week: 2.0 standard drinks     Types: 2 Glasses of wine per week     Comment: occasional    Drug use: No     Review of Systems   Constitutional: Negative for fatigue and fever.   HENT: Negative for sore throat.    Respiratory: Negative for chest tightness and shortness of breath.    Cardiovascular: Negative for chest pain.   Gastrointestinal: Negative for nausea.   Genitourinary: Negative for dysuria.   Musculoskeletal: Negative for back pain.   Skin: Negative for rash.   Neurological: Negative for  weakness.   Hematological: Does not bruise/bleed easily.       Physical Exam     Initial Vitals [01/01/22 1024]   BP Pulse Resp Temp SpO2   (!) 185/92 99 18 98.3 °F (36.8 °C) 99 %      MAP       --         Physical Exam    ED Course   Procedures  Labs Reviewed   SARS-COV-2 RNA AMPLIFICATION, QUAL          Imaging Results    None          Medications - No data to display  Medical Decision Making:   Initial Assessment:   Phone consult only:    81-year-old female with history of gastroesophageal reflux, hypertension, hyperlipidemia, type 2 diabetes, macular degeneration.  Patient status post ORIF right hip secondary to hip replacement.  Patient was seen emergency department earlier today 2nd to the fact that her  came in with low blood sugar in tested positive for COVID.  At this time patient was seeking a COVID test due to significant contact exposure.  Patient did receive her results per my chart and left prior to being seen by time medical providers.  Patient was phone called and per review of systems patient denied shortness of breath, no chest pain, no nausea, vomiting, no fever.  No other constitutional symptoms.        Differential Diagnosis:   COVID-19 exposure, COVID-19  Clinical Tests:   Lab Tests: Ordered and Reviewed  ED Management:  After detailed discussion with patient.  Patient is instructed to continue to quarantine as instructed per CDC guidelines.  She can return to the emergency department problems persist or worsen including shortness of breath, additional concerns or symptoms.  She is instructed to follow with primary care provider next week as well.  Again patient was not examined physically 2nd to the fact that patient did leave prior to physician physical contact.                      Clinical Impression:   Final diagnoses:  [Z20.822] Close exposure to COVID-19 virus (Primary)          ED Disposition Condition    Radha Escoto MD  01/01/22 1430

## 2022-01-04 ENCOUNTER — DOCUMENT SCAN (OUTPATIENT)
Dept: HOME HEALTH SERVICES | Facility: HOSPITAL | Age: 82
End: 2022-01-04
Payer: MEDICARE

## 2022-01-04 DIAGNOSIS — I10 ESSENTIAL HYPERTENSION: ICD-10-CM

## 2022-01-04 RX ORDER — HYDROCHLOROTHIAZIDE 25 MG/1
TABLET ORAL
Qty: 90 TABLET | Refills: 3 | Status: SHIPPED | OUTPATIENT
Start: 2022-01-04 | End: 2022-11-14

## 2022-01-04 NOTE — TELEPHONE ENCOUNTER
No new care gaps identified.  Powered by [employer]. Reference number: [id number].   1/04/2022 8:09:34 AM CST

## 2022-01-17 ENCOUNTER — INFUSION (OUTPATIENT)
Dept: INFUSION THERAPY | Facility: HOSPITAL | Age: 82
End: 2022-01-17
Attending: OBSTETRICS & GYNECOLOGY
Payer: MEDICARE

## 2022-01-17 VITALS
SYSTOLIC BLOOD PRESSURE: 156 MMHG | TEMPERATURE: 98 F | WEIGHT: 135.31 LBS | DIASTOLIC BLOOD PRESSURE: 88 MMHG | RESPIRATION RATE: 18 BRPM | OXYGEN SATURATION: 97 % | HEART RATE: 108 BPM | BODY MASS INDEX: 23.97 KG/M2

## 2022-01-17 DIAGNOSIS — C56.1 CARCINOMA OF RIGHT OVARY: Primary | ICD-10-CM

## 2022-01-17 PROCEDURE — 96523 IRRIG DRUG DELIVERY DEVICE: CPT

## 2022-01-17 PROCEDURE — 25000003 PHARM REV CODE 250: Performed by: OBSTETRICS & GYNECOLOGY

## 2022-01-17 PROCEDURE — 63600175 PHARM REV CODE 636 W HCPCS: Performed by: OBSTETRICS & GYNECOLOGY

## 2022-01-17 PROCEDURE — A4216 STERILE WATER/SALINE, 10 ML: HCPCS | Performed by: OBSTETRICS & GYNECOLOGY

## 2022-01-17 RX ORDER — HEPARIN 100 UNIT/ML
500 SYRINGE INTRAVENOUS
Status: CANCELLED | OUTPATIENT
Start: 2022-01-17

## 2022-01-17 RX ORDER — HEPARIN 100 UNIT/ML
500 SYRINGE INTRAVENOUS
Status: DISCONTINUED | OUTPATIENT
Start: 2022-01-17 | End: 2022-01-17 | Stop reason: HOSPADM

## 2022-01-17 RX ORDER — SODIUM CHLORIDE 0.9 % (FLUSH) 0.9 %
10 SYRINGE (ML) INJECTION
Status: CANCELLED | OUTPATIENT
Start: 2022-01-17

## 2022-01-17 RX ORDER — SODIUM CHLORIDE 0.9 % (FLUSH) 0.9 %
10 SYRINGE (ML) INJECTION
Status: DISCONTINUED | OUTPATIENT
Start: 2022-01-17 | End: 2022-01-17 | Stop reason: HOSPADM

## 2022-01-17 RX ADMIN — HEPARIN 500 UNITS: 100 SYRINGE at 11:01

## 2022-01-17 RX ADMIN — SODIUM CHLORIDE, PRESERVATIVE FREE 10 ML: 5 INJECTION INTRAVENOUS at 11:01

## 2022-01-17 NOTE — PLAN OF CARE
Problem: Fall Injury Risk  Goal: Absence of Fall and Fall-Related Injury  Outcome: Ongoing, Progressing  Intervention: Identify and Manage Contributors  Flowsheets (Taken 1/17/2022 1105)  Self-Care Promotion: independence encouraged  Medication Review/Management: medications reviewed  Intervention: Promote Injury-Free Environment  Flowsheets (Taken 1/17/2022 1105)  Safety Promotion/Fall Prevention: medications reviewed

## 2022-01-28 DIAGNOSIS — I10 ESSENTIAL HYPERTENSION: ICD-10-CM

## 2022-01-28 RX ORDER — BENAZEPRIL HYDROCHLORIDE 40 MG/1
TABLET ORAL
Qty: 90 TABLET | Refills: 3 | Status: SHIPPED | OUTPATIENT
Start: 2022-01-28 | End: 2023-02-27

## 2022-01-28 NOTE — TELEPHONE ENCOUNTER
Care Due:                  Date            Visit Type   Department     Provider  --------------------------------------------------------------------------------                                [facility]  Last Visit: 12-      FOLLOW UP    MEDICINE       Idalia Greco                                           [facility]  Next Visit: 05-      None         MEDICINE       Idalia Greco                                                            Last  Test          Frequency    Reason                     Performed    Due Date  --------------------------------------------------------------------------------    HBA1C.......  6 months...  metFORMIN................  10-   04-    Powered by [employer]. Reference number: [id number].   1/28/2022 8:09:46 AM CST

## 2022-02-01 DIAGNOSIS — S72.141D CLOSED FRACTURE OF FEMUR, INTERTROCHANTERIC, RIGHT, WITH ROUTINE HEALING, SUBSEQUENT ENCOUNTER: Primary | ICD-10-CM

## 2022-02-07 ENCOUNTER — OFFICE VISIT (OUTPATIENT)
Dept: ORTHOPEDICS | Facility: CLINIC | Age: 82
End: 2022-02-07
Payer: MEDICARE

## 2022-02-07 ENCOUNTER — HOSPITAL ENCOUNTER (OUTPATIENT)
Dept: RADIOLOGY | Facility: HOSPITAL | Age: 82
Discharge: HOME | End: 2022-02-07
Attending: ORTHOPAEDIC SURGERY
Payer: MEDICARE

## 2022-02-07 VITALS — HEIGHT: 63 IN | BODY MASS INDEX: 23.92 KG/M2 | RESPIRATION RATE: 18 BRPM | WEIGHT: 135 LBS

## 2022-02-07 DIAGNOSIS — M25.561 RIGHT KNEE PAIN, UNSPECIFIED CHRONICITY: ICD-10-CM

## 2022-02-07 DIAGNOSIS — M25.561 RIGHT KNEE PAIN, UNSPECIFIED CHRONICITY: Primary | ICD-10-CM

## 2022-02-07 DIAGNOSIS — S72.141D CLOSED FRACTURE OF FEMUR, INTERTROCHANTERIC, RIGHT, WITH ROUTINE HEALING, SUBSEQUENT ENCOUNTER: Primary | ICD-10-CM

## 2022-02-07 DIAGNOSIS — S72.141D CLOSED FRACTURE OF FEMUR, INTERTROCHANTERIC, RIGHT, WITH ROUTINE HEALING, SUBSEQUENT ENCOUNTER: ICD-10-CM

## 2022-02-07 DIAGNOSIS — M17.10 ARTHRITIS OF KNEE: ICD-10-CM

## 2022-02-07 PROCEDURE — 99213 OFFICE O/P EST LOW 20 MIN: CPT | Mod: 24,S$GLB,, | Performed by: ORTHOPAEDIC SURGERY

## 2022-02-07 PROCEDURE — 3288F FALL RISK ASSESSMENT DOCD: CPT | Mod: CPTII,S$GLB,, | Performed by: ORTHOPAEDIC SURGERY

## 2022-02-07 PROCEDURE — 73502 XR HIP WITH PELVIS WHEN PERFORMED, 2 OR 3  VIEWS RIGHT: ICD-10-PCS | Mod: 26,RT,, | Performed by: RADIOLOGY

## 2022-02-07 PROCEDURE — 99999 PR PBB SHADOW E&M-EST. PATIENT-LVL III: CPT | Mod: PBBFAC,,, | Performed by: ORTHOPAEDIC SURGERY

## 2022-02-07 PROCEDURE — 1160F RVW MEDS BY RX/DR IN RCRD: CPT | Mod: CPTII,S$GLB,, | Performed by: ORTHOPAEDIC SURGERY

## 2022-02-07 PROCEDURE — 1159F PR MEDICATION LIST DOCUMENTED IN MEDICAL RECORD: ICD-10-PCS | Mod: CPTII,S$GLB,, | Performed by: ORTHOPAEDIC SURGERY

## 2022-02-07 PROCEDURE — 99999 PR PBB SHADOW E&M-EST. PATIENT-LVL III: ICD-10-PCS | Mod: PBBFAC,,, | Performed by: ORTHOPAEDIC SURGERY

## 2022-02-07 PROCEDURE — 1159F MED LIST DOCD IN RCRD: CPT | Mod: CPTII,S$GLB,, | Performed by: ORTHOPAEDIC SURGERY

## 2022-02-07 PROCEDURE — 1101F PT FALLS ASSESS-DOCD LE1/YR: CPT | Mod: CPTII,S$GLB,, | Performed by: ORTHOPAEDIC SURGERY

## 2022-02-07 PROCEDURE — 1157F ADVNC CARE PLAN IN RCRD: CPT | Mod: CPTII,S$GLB,, | Performed by: ORTHOPAEDIC SURGERY

## 2022-02-07 PROCEDURE — 99213 PR OFFICE/OUTPT VISIT, EST, LEVL III, 20-29 MIN: ICD-10-PCS | Mod: 24,S$GLB,, | Performed by: ORTHOPAEDIC SURGERY

## 2022-02-07 PROCEDURE — 73564 X-RAY EXAM KNEE 4 OR MORE: CPT | Mod: 26,RT,, | Performed by: RADIOLOGY

## 2022-02-07 PROCEDURE — 73502 X-RAY EXAM HIP UNI 2-3 VIEWS: CPT | Mod: TC,PN,RT

## 2022-02-07 PROCEDURE — 97760 PR ORTHOTIC MGMT&TRAINJ INITIAL ENC EA 15 MINS: ICD-10-PCS | Mod: GP,S$GLB,, | Performed by: ORTHOPAEDIC SURGERY

## 2022-02-07 PROCEDURE — 3288F PR FALLS RISK ASSESSMENT DOCUMENTED: ICD-10-PCS | Mod: CPTII,S$GLB,, | Performed by: ORTHOPAEDIC SURGERY

## 2022-02-07 PROCEDURE — 1160F PR REVIEW ALL MEDS BY PRESCRIBER/CLIN PHARMACIST DOCUMENTED: ICD-10-PCS | Mod: CPTII,S$GLB,, | Performed by: ORTHOPAEDIC SURGERY

## 2022-02-07 PROCEDURE — 73502 X-RAY EXAM HIP UNI 2-3 VIEWS: CPT | Mod: 26,RT,, | Performed by: RADIOLOGY

## 2022-02-07 PROCEDURE — 73562 X-RAY EXAM OF KNEE 3: CPT | Mod: 26,LT,, | Performed by: RADIOLOGY

## 2022-02-07 PROCEDURE — 73562 XR KNEE ORTHO RIGHT WITH FLEXION: ICD-10-PCS | Mod: 26,LT,, | Performed by: RADIOLOGY

## 2022-02-07 PROCEDURE — 73562 X-RAY EXAM OF KNEE 3: CPT | Mod: 59,TC,PN,LT

## 2022-02-07 PROCEDURE — 1101F PR PT FALLS ASSESS DOC 0-1 FALLS W/OUT INJ PAST YR: ICD-10-PCS | Mod: CPTII,S$GLB,, | Performed by: ORTHOPAEDIC SURGERY

## 2022-02-07 PROCEDURE — 1157F PR ADVANCE CARE PLAN OR EQUIV PRESENT IN MEDICAL RECORD: ICD-10-PCS | Mod: CPTII,S$GLB,, | Performed by: ORTHOPAEDIC SURGERY

## 2022-02-07 PROCEDURE — 97760 ORTHOTIC MGMT&TRAING 1ST ENC: CPT | Mod: GP,S$GLB,, | Performed by: ORTHOPAEDIC SURGERY

## 2022-02-07 PROCEDURE — 1125F PR PAIN SEVERITY QUANTIFIED, PAIN PRESENT: ICD-10-PCS | Mod: CPTII,S$GLB,, | Performed by: ORTHOPAEDIC SURGERY

## 2022-02-07 PROCEDURE — 1125F AMNT PAIN NOTED PAIN PRSNT: CPT | Mod: CPTII,S$GLB,, | Performed by: ORTHOPAEDIC SURGERY

## 2022-02-07 PROCEDURE — 73564 XR KNEE ORTHO RIGHT WITH FLEXION: ICD-10-PCS | Mod: 26,RT,, | Performed by: RADIOLOGY

## 2022-02-07 RX ORDER — MELOXICAM 15 MG/1
15 TABLET ORAL DAILY
Qty: 30 TABLET | Refills: 2 | Status: SHIPPED | OUTPATIENT
Start: 2022-02-07 | End: 2022-05-31

## 2022-02-07 NOTE — PROGRESS NOTES
Past Medical History:   Diagnosis Date    Arthritis     Cataract     Diabetes mellitus type II 2012    Encounter for blood transfusion     Extra-ovarian endometrioid adenocarcinoma 2020    GERD (gastroesophageal reflux disease)     Hyperlipidemia     Hypertension     Macular degeneration     PONV (postoperative nausea and vomiting)     Squamous cell carcinoma 1980's    precancer of cervix       Past Surgical History:   Procedure Laterality Date    AUGMENTATION OF BREAST      CHOLECYSTECTOMY      ESOPHAGOGASTRODUODENOSCOPY N/A 6/20/2018    Procedure: EGD (ESOPHAGOGASTRODUODENOSCOPY);  Surgeon: Sabino Stephenson MD;  Location: [facility];  Service: Endoscopy;  Laterality: N/A;    HYSTERECTOMY      partial    INSERTION OF TUNNELED CENTRAL VENOUS CATHETER (CVC) WITH SUBCUTANEOUS PORT Right 10/19/2020    Procedure: INSERTION, PORT-A-CATH;  Surgeon: Misti Mcfarland MD;  Location: [facility];  Service: General;  Laterality: Right;    INTRAMEDULLARY RODDING OF TROCHANTER OF FEMUR Right 11/28/2021    Procedure: INSERTION, INTRAMEDULLARY LUC, FEMUR, TROCHANTER/RIGHT TFN DR FAJARDO NOTIFIED REP;  Surgeon: Kp Fajardo MD;  Location: [facility];  Service: Orthopedics;  Laterality: Right;  SKIP    ROBOT-ASSISTED LAPAROSCOPIC LYMPHADENECTOMY USING DA NELLA XI N/A 9/21/2020    Procedure: XI ROBOTIC LYMPHADENECTOMY-pelvic and kell-aortic;  Surgeon: Altagracia Ray MD;  Location: [facility];  Service: OB/GYN;  Laterality: N/A;    ROBOT-ASSISTED LAPAROSCOPIC OMENTECTOMY USING DA NELLA XI N/A 9/21/2020    Procedure: XI ROBOTIC OMENTECTOMY;  Surgeon: Altagracia Ray MD;  Location: [facility] OR;  Service: OB/GYN;  Laterality: N/A;    ROBOT-ASSISTED LAPAROSCOPIC SALPINGO-OOPHORECTOMY USING DA NELLA XI Bilateral 9/21/2020    Procedure: XI ROBOTIC SALPINGO-OOPHORECTOMY;  Surgeon: Altagracia Ray MD;  Location: [facility] OR;  Service: OB/GYN;  Laterality: Bilateral;       Current Outpatient Medications   Medication  Sig    benazepriL (LOTENSIN) 40 MG tablet TAKE 1 TABLET(40 MG) BY MOUTH EVERY DAY    gabapentin (NEURONTIN) 100 MG capsule Take 1 capsule (100 mg total) by mouth 2 (two) times daily.    hydroCHLOROthiazide (HYDRODIURIL) 25 MG tablet TAKE 1 TABLET(25 MG) BY MOUTH EVERY DAY    metFORMIN (GLUCOPHAGE) 500 MG tablet TAKE 1 TABLET(500 MG) BY MOUTH TWICE DAILY WITH MEALS    omeprazole (PRILOSEC) 40 MG capsule TAKE 1 CAPSULE(40 MG) BY MOUTH EVERY DAY    simvastatin (ZOCOR) 40 MG tablet TAKE 1 TABLET(40 MG) BY MOUTH EVERY EVENING    tiZANidine (ZANAFLEX) 2 MG tablet TAKE 2 TABLETS(4 MG) BY MOUTH EVERY 6 HOURS AS NEEDED FOR SPASMS    VIT A/VIT C/VIT E/ZINC/COPPER (ICAPS AREDS ORAL) Take 1 capsule by mouth 2 (two) times a day.     HYDROcodone-acetaminophen (NORCO) 5-325 mg per tablet Take 1 tablet by mouth every 12 (twelve) hours as needed for Pain. (Patient not taking: Reported on 2/7/2022)     No current facility-administered medications for this visit.       Review of patient's allergies indicates:  No Known Allergies    Family History   Problem Relation Age of Onset    Cancer Mother 57        lung    Cancer Father 56        oral    Skin cancer Sister     Skin cancer Brother     Melanoma Brother     Skin cancer Brother     Breast cancer Paternal Grandmother     Psoriasis Neg Hx     Lupus Neg Hx     Eczema Neg Hx        Social History     Socioeconomic History    Marital status:    Tobacco Use    Smoking status: Former Smoker     Packs/day: 1.00     Years: 20.00     Pack years: 20.00     Types: Cigarettes    Smokeless tobacco: Never Used    Tobacco comment: quit 40 yrs ago   Substance and Sexual Activity    Alcohol use: Yes     Alcohol/week: 2.0 standard drinks     Types: 2 Glasses of wine per week     Comment: occasional    Drug use: No    Sexual activity: Not Currently     Partners: Male     Social Determinants of Health     Financial Resource Strain: Medium Risk    Difficulty of Paying  Living Expenses: Somewhat hard   Food Insecurity: No Food Insecurity    Worried About Running Out of Food in the Last Year: Never true    Ran Out of Food in the Last Year: Never true   Transportation Needs: No Transportation Needs    Lack of Transportation (Medical): No    Lack of Transportation (Non-Medical): No   Physical Activity: Unknown    Days of Exercise per Week: 0 days   Stress: Stress Concern Present    Feeling of Stress : To some extent   Social Connections: Unknown    Frequency of Communication with Friends and Family: More than three times a week    Frequency of Social Gatherings with Friends and Family: Twice a week    Active Member of Clubs or Organizations: No    Attends Club or Organization Meetings: Never    Marital Status:    Housing Stability: Low Risk     Unable to Pay for Housing in the Last Year: No    Number of Places Lived in the Last Year: 1    Unstable Housing in the Last Year: No       Chief Complaint:   Chief Complaint   Patient presents with    Follow-up     Closed fracture of femur, intertrochanteric, right       Date of surgery:  November 28, 2021    History of present illness:  81-year-old female underwent IM nailing for right intertrochanteric femur fracture.  She is doing pretty well.  Her main complaint is some groin pain when she gets up out of a chair.  She finished home therapy and elected not to do outpatient physical therapy due to COVID concerns.  Rates her pain is a 4/10.  Also complaining of some medial right knee pain.  Feels like she is starting to get more knock kneed.      Review of Systems:    Musculoskeletal:  See HPI        Physical Examination:    Vital Signs:    Vitals:    02/07/22 0936   Resp: 18       Body mass index is 23.91 kg/m².    This a well-developed, well nourished patient in no acute distress.  They are alert and oriented and cooperative to examination.  Pt. walks with a mild antalgic gait without the need of a  walker.    Examination of the right hip shows healed surgical incisions.  No erythema drainage.  Minimal pain with range of motion of the hip.    X-rays:  X-rays of the right hip are ordered and review which show a tati and screw in position.  There is some displacement of a greater trochanteric tip fracture.  Interval healing is noted.  X-rays of the right knee are ordered and reviewed which show some moderate arthritis in both knees with medial joint space narrowing.     Assessment::  Status post IM nailing right intertrochanteric hip fracture  Right knee arthritis    Plan:  Reviewed the x-rays with her today.  Fracture appears to be healing.  I will see her back in 6 weeks with another x-ray of the right hip.  Reviewed the knee x-rays with her as well.  Talked about treatment options for the arthritis.  It overly painful so we decided just on a bio skin brace for some support and Mobic 15 milligrams daily.    We performed a custom orthotic/brace fitting, adjusting and training with the patient. The patient demonstrated understanding and proper care. This was performed for 15 minutes.          This note was created using stylemarks voice recognition software that occasionally misinterpreted phrases or words.

## 2022-02-10 ENCOUNTER — PATIENT MESSAGE (OUTPATIENT)
Dept: FAMILY MEDICINE | Facility: CLINIC | Age: 82
End: 2022-02-10
Payer: MEDICARE

## 2022-02-10 DIAGNOSIS — T45.1X5A PERIPHERAL NEUROPATHY DUE TO CHEMOTHERAPY: ICD-10-CM

## 2022-02-10 DIAGNOSIS — G62.0 PERIPHERAL NEUROPATHY DUE TO CHEMOTHERAPY: ICD-10-CM

## 2022-02-10 NOTE — TELEPHONE ENCOUNTER
No new care gaps identified.  Powered by [employer]. Reference number: [id number].   2/10/2022 4:35:35 PM CST

## 2022-02-11 RX ORDER — GABAPENTIN 100 MG/1
100 CAPSULE ORAL 2 TIMES DAILY
Qty: 180 CAPSULE | Refills: 3 | Status: SHIPPED | OUTPATIENT
Start: 2022-02-11 | End: 2023-01-30

## 2022-02-15 ENCOUNTER — DOCUMENT SCAN (OUTPATIENT)
Dept: HOME HEALTH SERVICES | Facility: HOSPITAL | Age: 82
End: 2022-02-15
Payer: MEDICARE

## 2022-02-28 ENCOUNTER — INFUSION (OUTPATIENT)
Dept: INFUSION THERAPY | Facility: HOSPITAL | Age: 82
End: 2022-02-28
Attending: OBSTETRICS & GYNECOLOGY
Payer: MEDICARE

## 2022-02-28 VITALS
HEART RATE: 98 BPM | BODY MASS INDEX: 23.77 KG/M2 | WEIGHT: 134.19 LBS | RESPIRATION RATE: 18 BRPM | TEMPERATURE: 97 F | DIASTOLIC BLOOD PRESSURE: 64 MMHG | SYSTOLIC BLOOD PRESSURE: 116 MMHG | OXYGEN SATURATION: 99 %

## 2022-02-28 DIAGNOSIS — C56.1 CARCINOMA OF RIGHT OVARY: Primary | ICD-10-CM

## 2022-02-28 PROCEDURE — 36591 DRAW BLOOD OFF VENOUS DEVICE: CPT

## 2022-02-28 PROCEDURE — A4216 STERILE WATER/SALINE, 10 ML: HCPCS | Performed by: OBSTETRICS & GYNECOLOGY

## 2022-02-28 PROCEDURE — 86304 IMMUNOASSAY TUMOR CA 125: CPT | Performed by: OBSTETRICS & GYNECOLOGY

## 2022-02-28 PROCEDURE — 63600175 PHARM REV CODE 636 W HCPCS: Performed by: OBSTETRICS & GYNECOLOGY

## 2022-02-28 PROCEDURE — 25000003 PHARM REV CODE 250: Performed by: OBSTETRICS & GYNECOLOGY

## 2022-02-28 RX ORDER — SODIUM CHLORIDE 0.9 % (FLUSH) 0.9 %
10 SYRINGE (ML) INJECTION
Status: DISCONTINUED | OUTPATIENT
Start: 2022-02-28 | End: 2022-02-28 | Stop reason: HOSPADM

## 2022-02-28 RX ORDER — SODIUM CHLORIDE 0.9 % (FLUSH) 0.9 %
10 SYRINGE (ML) INJECTION
Status: CANCELLED | OUTPATIENT
Start: 2022-02-28

## 2022-02-28 RX ORDER — HEPARIN 100 UNIT/ML
500 SYRINGE INTRAVENOUS
Status: DISCONTINUED | OUTPATIENT
Start: 2022-02-28 | End: 2022-02-28 | Stop reason: HOSPADM

## 2022-02-28 RX ORDER — HEPARIN 100 UNIT/ML
500 SYRINGE INTRAVENOUS
Status: CANCELLED | OUTPATIENT
Start: 2022-02-28

## 2022-02-28 RX ADMIN — SODIUM CHLORIDE, PRESERVATIVE FREE 10 ML: 5 INJECTION INTRAVENOUS at 10:02

## 2022-02-28 RX ADMIN — Medication 500 UNITS: at 10:02

## 2022-03-01 LAB — CANCER AG125 SERPL-ACNC: 7.9 U/ML (ref 0–38.1)

## 2022-03-15 DIAGNOSIS — M25.561 RIGHT KNEE PAIN, UNSPECIFIED CHRONICITY: Primary | ICD-10-CM

## 2022-03-15 DIAGNOSIS — M25.551 RIGHT HIP PAIN: ICD-10-CM

## 2022-03-21 ENCOUNTER — HOSPITAL ENCOUNTER (OUTPATIENT)
Dept: RADIOLOGY | Facility: HOSPITAL | Age: 82
Discharge: HOME | End: 2022-03-21
Attending: ORTHOPAEDIC SURGERY
Payer: MEDICARE

## 2022-03-21 ENCOUNTER — OFFICE VISIT (OUTPATIENT)
Dept: ORTHOPEDICS | Facility: CLINIC | Age: 82
End: 2022-03-21
Payer: MEDICARE

## 2022-03-21 VITALS — HEIGHT: 63 IN | WEIGHT: 134 LBS | RESPIRATION RATE: 18 BRPM | BODY MASS INDEX: 23.74 KG/M2

## 2022-03-21 DIAGNOSIS — M25.551 RIGHT HIP PAIN: ICD-10-CM

## 2022-03-21 DIAGNOSIS — M25.551 RIGHT HIP PAIN: Primary | ICD-10-CM

## 2022-03-21 DIAGNOSIS — S72.141D CLOSED FRACTURE OF FEMUR, INTERTROCHANTERIC, RIGHT, WITH ROUTINE HEALING, SUBSEQUENT ENCOUNTER: ICD-10-CM

## 2022-03-21 PROCEDURE — 1125F AMNT PAIN NOTED PAIN PRSNT: CPT | Mod: CPTII,S$GLB,, | Performed by: ORTHOPAEDIC SURGERY

## 2022-03-21 PROCEDURE — 1159F MED LIST DOCD IN RCRD: CPT | Mod: CPTII,S$GLB,, | Performed by: ORTHOPAEDIC SURGERY

## 2022-03-21 PROCEDURE — 99999 PR PBB SHADOW E&M-EST. PATIENT-LVL III: CPT | Mod: PBBFAC,,, | Performed by: ORTHOPAEDIC SURGERY

## 2022-03-21 PROCEDURE — 1101F PT FALLS ASSESS-DOCD LE1/YR: CPT | Mod: CPTII,S$GLB,, | Performed by: ORTHOPAEDIC SURGERY

## 2022-03-21 PROCEDURE — 99999 PR PBB SHADOW E&M-EST. PATIENT-LVL III: ICD-10-PCS | Mod: PBBFAC,,, | Performed by: ORTHOPAEDIC SURGERY

## 2022-03-21 PROCEDURE — 1160F PR REVIEW ALL MEDS BY PRESCRIBER/CLIN PHARMACIST DOCUMENTED: ICD-10-PCS | Mod: CPTII,S$GLB,, | Performed by: ORTHOPAEDIC SURGERY

## 2022-03-21 PROCEDURE — 1125F PR PAIN SEVERITY QUANTIFIED, PAIN PRESENT: ICD-10-PCS | Mod: CPTII,S$GLB,, | Performed by: ORTHOPAEDIC SURGERY

## 2022-03-21 PROCEDURE — 73502 XR HIP WITH PELVIS WHEN PERFORMED, 2 OR 3  VIEWS RIGHT: ICD-10-PCS | Mod: 26,RT,, | Performed by: RADIOLOGY

## 2022-03-21 PROCEDURE — 99213 OFFICE O/P EST LOW 20 MIN: CPT | Mod: S$GLB,,, | Performed by: ORTHOPAEDIC SURGERY

## 2022-03-21 PROCEDURE — 1157F ADVNC CARE PLAN IN RCRD: CPT | Mod: CPTII,S$GLB,, | Performed by: ORTHOPAEDIC SURGERY

## 2022-03-21 PROCEDURE — 1101F PR PT FALLS ASSESS DOC 0-1 FALLS W/OUT INJ PAST YR: ICD-10-PCS | Mod: CPTII,S$GLB,, | Performed by: ORTHOPAEDIC SURGERY

## 2022-03-21 PROCEDURE — 1157F PR ADVANCE CARE PLAN OR EQUIV PRESENT IN MEDICAL RECORD: ICD-10-PCS | Mod: CPTII,S$GLB,, | Performed by: ORTHOPAEDIC SURGERY

## 2022-03-21 PROCEDURE — 3288F FALL RISK ASSESSMENT DOCD: CPT | Mod: CPTII,S$GLB,, | Performed by: ORTHOPAEDIC SURGERY

## 2022-03-21 PROCEDURE — 1159F PR MEDICATION LIST DOCUMENTED IN MEDICAL RECORD: ICD-10-PCS | Mod: CPTII,S$GLB,, | Performed by: ORTHOPAEDIC SURGERY

## 2022-03-21 PROCEDURE — 1160F RVW MEDS BY RX/DR IN RCRD: CPT | Mod: CPTII,S$GLB,, | Performed by: ORTHOPAEDIC SURGERY

## 2022-03-21 PROCEDURE — 73502 X-RAY EXAM HIP UNI 2-3 VIEWS: CPT | Mod: TC,PN,RT

## 2022-03-21 PROCEDURE — 73502 X-RAY EXAM HIP UNI 2-3 VIEWS: CPT | Mod: 26,RT,, | Performed by: RADIOLOGY

## 2022-03-21 PROCEDURE — 3288F PR FALLS RISK ASSESSMENT DOCUMENTED: ICD-10-PCS | Mod: CPTII,S$GLB,, | Performed by: ORTHOPAEDIC SURGERY

## 2022-03-21 PROCEDURE — 99213 PR OFFICE/OUTPT VISIT, EST, LEVL III, 20-29 MIN: ICD-10-PCS | Mod: S$GLB,,, | Performed by: ORTHOPAEDIC SURGERY

## 2022-03-21 NOTE — PROGRESS NOTES
Past Medical History:   Diagnosis Date    Arthritis     Cataract     Diabetes mellitus type II 2012    Encounter for blood transfusion     Extra-ovarian endometrioid adenocarcinoma 2020    GERD (gastroesophageal reflux disease)     Hyperlipidemia     Hypertension     Macular degeneration     PONV (postoperative nausea and vomiting)     Squamous cell carcinoma 1980's    precancer of cervix       Past Surgical History:   Procedure Laterality Date    AUGMENTATION OF BREAST      CHOLECYSTECTOMY      ESOPHAGOGASTRODUODENOSCOPY N/A 6/20/2018    Procedure: EGD (ESOPHAGOGASTRODUODENOSCOPY);  Surgeon: Sabino Stephenson MD;  Location: [facility];  Service: Endoscopy;  Laterality: N/A;    HYSTERECTOMY      partial    INSERTION OF TUNNELED CENTRAL VENOUS CATHETER (CVC) WITH SUBCUTANEOUS PORT Right 10/19/2020    Procedure: INSERTION, PORT-A-CATH;  Surgeon: Misti Mcfarland MD;  Location: [facility];  Service: General;  Laterality: Right;    INTRAMEDULLARY RODDING OF TROCHANTER OF FEMUR Right 11/28/2021    Procedure: INSERTION, INTRAMEDULLARY LUC, FEMUR, TROCHANTER/RIGHT TFN DR FAJARDO NOTIFIED REP;  Surgeon: Kp Fajardo MD;  Location: [facility];  Service: Orthopedics;  Laterality: Right;  SKIP    ROBOT-ASSISTED LAPAROSCOPIC LYMPHADENECTOMY USING DA NELLA XI N/A 9/21/2020    Procedure: XI ROBOTIC LYMPHADENECTOMY-pelvic and kell-aortic;  Surgeon: Altagracia Ray MD;  Location: [facility];  Service: OB/GYN;  Laterality: N/A;    ROBOT-ASSISTED LAPAROSCOPIC OMENTECTOMY USING DA NELLA XI N/A 9/21/2020    Procedure: XI ROBOTIC OMENTECTOMY;  Surgeon: Altagracia Ray MD;  Location: [facility] OR;  Service: OB/GYN;  Laterality: N/A;    ROBOT-ASSISTED LAPAROSCOPIC SALPINGO-OOPHORECTOMY USING DA NELLA XI Bilateral 9/21/2020    Procedure: XI ROBOTIC SALPINGO-OOPHORECTOMY;  Surgeon: Altagracia Ray MD;  Location: [facility] OR;  Service: OB/GYN;  Laterality: Bilateral;       Current Outpatient Medications   Medication  Sig    benazepriL (LOTENSIN) 40 MG tablet TAKE 1 TABLET(40 MG) BY MOUTH EVERY DAY    gabapentin (NEURONTIN) 100 MG capsule Take 1 capsule (100 mg total) by mouth 2 (two) times daily.    hydroCHLOROthiazide (HYDRODIURIL) 25 MG tablet TAKE 1 TABLET(25 MG) BY MOUTH EVERY DAY    meloxicam (MOBIC) 15 MG tablet Take 1 tablet (15 mg total) by mouth once daily.    metFORMIN (GLUCOPHAGE) 500 MG tablet TAKE 1 TABLET(500 MG) BY MOUTH TWICE DAILY WITH MEALS    omeprazole (PRILOSEC) 40 MG capsule TAKE 1 CAPSULE(40 MG) BY MOUTH EVERY DAY    simvastatin (ZOCOR) 40 MG tablet TAKE 1 TABLET(40 MG) BY MOUTH EVERY EVENING    VIT A/VIT C/VIT E/ZINC/COPPER (ICAPS AREDS ORAL) Take 1 capsule by mouth 2 (two) times a day.     HYDROcodone-acetaminophen (NORCO) 5-325 mg per tablet Take 1 tablet by mouth every 12 (twelve) hours as needed for Pain. (Patient not taking: Reported on 2/7/2022)    tiZANidine (ZANAFLEX) 2 MG tablet TAKE 2 TABLETS(4 MG) BY MOUTH EVERY 6 HOURS AS NEEDED FOR SPASMS     No current facility-administered medications for this visit.       Review of patient's allergies indicates:  No Known Allergies    Family History   Problem Relation Age of Onset    Cancer Mother 57        lung    Cancer Father 56        oral    Skin cancer Sister     Skin cancer Brother     Melanoma Brother     Skin cancer Brother     Breast cancer Paternal Grandmother     Psoriasis Neg Hx     Lupus Neg Hx     Eczema Neg Hx        Social History     Socioeconomic History    Marital status:    Tobacco Use    Smoking status: Former Smoker     Packs/day: 1.00     Years: 20.00     Pack years: 20.00     Types: Cigarettes    Smokeless tobacco: Never Used    Tobacco comment: quit 40 yrs ago   Substance and Sexual Activity    Alcohol use: Yes     Alcohol/week: 2.0 standard drinks     Types: 2 Glasses of wine per week     Comment: occasional    Drug use: No    Sexual activity: Not Currently     Partners: Male     Social  Determinants of Health     Financial Resource Strain: Medium Risk    Difficulty of Paying Living Expenses: Somewhat hard   Food Insecurity: No Food Insecurity    Worried About Running Out of Food in the Last Year: Never true    Ran Out of Food in the Last Year: Never true   Transportation Needs: No Transportation Needs    Lack of Transportation (Medical): No    Lack of Transportation (Non-Medical): No   Physical Activity: Unknown    Days of Exercise per Week: 0 days   Stress: Stress Concern Present    Feeling of Stress : To some extent   Social Connections: Unknown    Frequency of Communication with Friends and Family: More than three times a week    Frequency of Social Gatherings with Friends and Family: Twice a week    Active Member of Clubs or Organizations: No    Attends Club or Organization Meetings: Never    Marital Status:    Housing Stability: Low Risk     Unable to Pay for Housing in the Last Year: No    Number of Places Lived in the Last Year: 1    Unstable Housing in the Last Year: No       Chief Complaint:   Chief Complaint   Patient presents with    Right Hip - Post-op Evaluation       Date of surgery:  November 28, 2021    History of present illness:  82-year-old female underwent IM nailing for right intertrochanteric femur fracture.  She is doing pretty well.  She never did do physical therapy.  Had an episode over the weekend where she had a sharp pain kind of in her buttocks and traveled down the back of her leg to her knee.  Pain was a 10/10 at the time.  It is a 2/10 now.  Feels like the leg is weak.    Review of Systems:    Musculoskeletal:  See HPI        Physical Examination:    Vital Signs:    Vitals:    03/21/22 0922   Resp: 18       Body mass index is 23.74 kg/m².    This a well-developed, well nourished patient in no acute distress.  They are alert and oriented and cooperative to examination.  Pt. walks with a mild antalgic gait without the need of a  walker.    Examination of the right hip shows healed surgical incisions.  No erythema drainage.  Minimal pain with range of motion of the hip.  Is a little tight on the right hip with range of motion    X-rays:  X-rays of the right hip are ordered and review which show a tati and screw in position.  There is some displacement of a greater trochanteric tip fracture.  Interval healing is noted.  X-rays of the right knee are  reviewed which show some moderate arthritis in both knees with medial joint space narrowing.     Assessment::  Status post IM nailing right intertrochanteric hip fracture  Right hip weakness and possible tendon strain    Plan:  Reviewed the x-rays with her today.  Fracture appears to be healing.  Recommended getting her into some physical therapy to work on strengthening in that right leg as well as some flexibility in the muscles around her hip.  I will see her back in 6-8 weeks.  No x-ray needed.    We performed a custom orthotic/brace fitting, adjusting and training with the patient. The patient demonstrated understanding and proper care. This was performed for 15 minutes.          This note was created using [employer] voice recognition software that occasionally misinterpreted phrases or words.

## 2022-03-22 ENCOUNTER — PATIENT MESSAGE (OUTPATIENT)
Dept: FAMILY MEDICINE | Facility: CLINIC | Age: 82
End: 2022-03-22
Payer: MEDICARE

## 2022-03-22 DIAGNOSIS — Z12.31 SCREENING MAMMOGRAM, ENCOUNTER FOR: Primary | ICD-10-CM

## 2022-03-23 ENCOUNTER — PATIENT MESSAGE (OUTPATIENT)
Dept: FAMILY MEDICINE | Facility: CLINIC | Age: 82
End: 2022-03-23
Payer: MEDICARE

## 2022-03-28 ENCOUNTER — CLINICAL SUPPORT (OUTPATIENT)
Dept: REHABILITATION | Facility: HOSPITAL | Age: 82
End: 2022-03-28
Attending: ORTHOPAEDIC SURGERY
Payer: MEDICARE

## 2022-03-28 DIAGNOSIS — R29.898 WEAKNESS OF RIGHT LOWER EXTREMITY: ICD-10-CM

## 2022-03-28 DIAGNOSIS — S72.141D CLOSED FRACTURE OF FEMUR, INTERTROCHANTERIC, RIGHT, WITH ROUTINE HEALING, SUBSEQUENT ENCOUNTER: ICD-10-CM

## 2022-03-28 DIAGNOSIS — M25.551 RIGHT HIP PAIN: ICD-10-CM

## 2022-03-28 DIAGNOSIS — Z74.09 IMPAIRED FUNCTIONAL MOBILITY AND ACTIVITY TOLERANCE: ICD-10-CM

## 2022-03-28 PROCEDURE — 97161 PT EVAL LOW COMPLEX 20 MIN: CPT | Mod: PN

## 2022-03-28 PROCEDURE — 97110 THERAPEUTIC EXERCISES: CPT | Mod: PN

## 2022-03-28 NOTE — PLAN OF CARE
[facility]  Physical Therapy Initial Evaluation    Name: Alexa Otero  [facility] Number: [id number]    Therapy Diagnosis:   Encounter Diagnoses   Name Primary?    Right hip pain     Closed fracture of femur, intertrochanteric, right, with routine healing, subsequent encounter     Weakness of right lower extremity     Impaired functional mobility and activity tolerance        Physician: Kp Gutierres,*  Physician Orders: PT Eval and Treat  Medical Diagnosis from Referral:   M25.551 (ICD-10-CM) - Right hip pain   S72.141D (ICD-10-CM) - Closed fracture of femur, intertrochanteric, right, with routine healing, subsequent encounter     Evaluation Date: 3/28/2022  Authorization Period Start - Expiration: 03/25/22 - 12/31/22  Plan of Care Certification Period: 3/28/22 - 6/28/22  Progress Note Due: 4/28  Visit # / Visits authorized: 1/ 20  (1/12 POC)  FOTO: Issued Visit #: 1    Time In: 1245  Time Out: 130  Total Billable Timed: 45 minutes  Total Billable Un-timed: 0 minutes    Precautions: Standard    Subjective   Date of onset: 11/27/21, Fell and fractured right hip  History of current condition - [place] reports:  She fell and fractured her hip on 11/27/22 with subsequent surgery for placing a tati and  IM nail on 11/28/2021.Patient did go to inpatient rehab after, and home health once discharged. Currently she still gets pain in her groin and around her trochanter. and feels weak. She does not use any assistive device and can do all her ADLS with some increased discomfort.     Medical History:   Past Medical History:   Diagnosis Date    Arthritis     Cataract     Diabetes mellitus type II 2012    Encounter for blood transfusion     Extra-ovarian endometrioid adenocarcinoma 2020    GERD (gastroesophageal reflux disease)     Hyperlipidemia     Hypertension     Macular degeneration     PONV (postoperative nausea and vomiting)     Squamous cell carcinoma 1980's    precancer of  cervix       Surgical History:   Alexa Otero  has a past surgical history that includes Hysterectomy; Cholecystectomy; Esophagogastroduodenoscopy (N/A, 6/20/2018); Augmentation of breast; Robot-assisted laparoscopic salpingo-oophorectomy using da Alexus Xi (Bilateral, 9/21/2020); Robot-assisted laparoscopic lymphadenectomy using da Alexus Xi (N/A, 9/21/2020); Robot-assisted laparoscopic omentectomy using da Alexus Xi (N/A, 9/21/2020); Insertion of tunneled central venous catheter (CVC) with subcutaneous port (Right, 10/19/2020); and Intramedullary rodding of trochanter of femur (Right, 11/28/2021).    Medications:   Alexa has a current medication list which includes the following prescription(s): benazepril, gabapentin, hydrochlorothiazide, meloxicam, metformin, omeprazole, simvastatin, and vit a/vit c/vit e/zinc/copper.    Allergies:   Review of patient's allergies indicates:  No Known Allergies     Imaging, none:    Prior Therapy: Rehab, after surgery  Social History: lives with their spouse  Occupation: Retired  Prior Level of Function: Independent  Current Level of Function: 80% of normal    Pain:  Current 0/10, worst 10/10, best 0/10   Location: right upper legs  Description: Aching, Throbbing, Sharp and Shooting  Aggravating Factors: Sitting, Standing and Walking  Easing Factors: heating pad    Pts goals: get stronger and not limp    Objective     Structural/Postural Inspection: Healed surgical incison, no bruising or edema    Palpation for Condition: Tender to greater trochanter      Active Range of Motion (AROM)/Passive Range of Motion (PROM):     Hip/Knee/Ankle (Degrees) AROM (Right) PROM (Right) AROM (Left) PROM (Left) Comments   Hip Flexion 100       Hip Extension 10       Hip Abduction 10       Hip Adduction 20       Hip Internal Rotation 15       Hip External Rotation 25       Knee Flexion wnl       Knee Extension wnl       Ankle Dorsiflexion wnl       Ankle Plantarflexion wnl       Ankle Inversion wnl        Ankle Eversion wnl           Muscle Length Tension Testing:     Lumbar/Hip/Knee Right  Left  Comments   Dmitriy Test tight     Hamstring Length (SLR) normal     Ely's Test normal     Tacos's Test tight     Piriformis Length Test tight       Manual Muscle Testing:     RLE 5/5 4+/5 4/5 4-/5 3+/5 3/5 3-/5 2+/5 2/5 2-/5 1/5 0   Hip Flexion  x             Hip Extension  x             Hip Abduction   x            Hip Adduction  x             Hip Internal Rotation   x            Hip External Rotation   x            Knee Flexion x              Knee Extension x              Ankle Dorsiflexion x              Ankle Plantarflexion x              Ankle Inversion x              Ankle Eversion x                LLE 5/5 4+/5 4/5 4-/5 3+/5 3/5 3-/5 2+/5 2/5 2-/5 1/5 0   Hip Flexion x              Hip Extension x              Hip Abduction x              Hip Adduction x              Hip Internal Rotation x              Hip External Rotation x              Knee Flexion x              Knee Extension x              Ankle Dorsiflexion x              Ankle Plantarflexion x              Ankle Inversion x              Ankle Eversion x                Special Tests:    Hip Right Left   Trendelenburg Test Negative.    FADDIR Test Negative.    Log Roll Test Negative.    Scouring Negative.    Long Axis Distraction Negative.      Movement Analysis:  SLS on Right:lack stability  SLS on Left:lack stability  Sit <> Stand:normal  Stairs:normal, uses rail  Squat:knee vlagus  Gait:slight antalgic pattern        Neurovascular/Neural Tension:     LE Sensation Right Left Comments   Light Touch Intact.                 Crude Touch Intact.           Proprioception Intact.       Upper Motor Neuron Results  Comments   Elizondo's Negative.    Babinski Negative.    Clonus Negative.            Limitation/Restriction for FOTO LEFS Survey    Therapist reviewed FOTO scores for Alexa Otero on 3/28/2022.   FOTO documents entered into EPIC - see Media  section.    Limitation Score: 31%         TREATMENT   Treatment Time In: 1245  Treatment Time Out: 130  Total Treatment time separate from Evaluation: 15 minutes    Alexa received 1:1 therapeutic exercises to develop strength, endurance, ROM, flexibility and posture for 15 minutes including:  Hip ADD Ball  Hip ABD Green TheraBand   Posterior Pelvic tilts  Hip IR/ER Red TheraBand   +Bridges  +Shuttle  +Step ups  +GS stretch      Patient Education and Home Exercise Program     Education provided:   - Discussed healing process after hip surgery, went over home exercises.     Written Home Exercises Provided: yes.  Exercises were reviewed and Alexa was able to demonstrate them prior to the end of the session.  Alexa demonstrated fair  understanding of the education provided.     See EMR under Patient Instructions for exercises provided 3/28/2022.    Assessment   Alexa is a 82 y.o. female referred to outpatient Physical Therapy with a medical diagnosis of S/P left femur fracture with IM nail and tati. The patient demonstrates weakness of her left hip and Lower Extremity , decreased balance and stability . She ambulates with an antalgic pattern, but does not require any assistive device.     The patient will benefit from skilled outpatient Physical Therapy to address the deficits stated above and in the chart below, provide pt/family education, and to maximize pt's level of independence.     Pt prognosis is Good.     Plan of care discussed with patient: Yes  Pt's spiritual, cultural and educational needs considered and patient is agreeable to the plan of care and goals as stated below:     Anticipated Barriers for therapy: none    Medical Necessity is demonstrated by the following  History  Co-morbidities and personal factors that may impact the plan of care Co-morbidities:   difficulty sleeping and history of cancer    Personal Factors:   no deficits     low   Examination  Body Structures and Functions, activity  limitations and participation restrictions that may impact the plan of care Body Regions:   back  lower extremities    Body Systems:    gross symmetry  ROM  strength  gross coordinated movement  balance  gait  transfers  transitions    Participation Restrictions:   none    Activity limitations:   Learning and applying knowledge  no deficits    General Tasks and Commands  no deficits    Communication  communicating with/receiving non-verbal language    Mobility  lifting and carrying objects    Self care  no deficits    Domestic Life  shopping  doing house work (cleaning house, washing dishes, laundry)    Interactions/Relationships  no deficits    Life Areas  no deficits    Community and Social Life  community life  recreation and leisure         low   Clinical Presentation stable and uncomplicated low   Decision Making/ Complexity Score: low     Goals:  .  Short Term Goals (STG) # weeks Goal Review Date Reviewed Date Met   Pt will demonstrate independence with initial HEP to facilitate therex progression and improvements in functional mobility 1 Initial 3/28/2022    The patient will increase bilateral lower extremity strength to greater than or equal to 1/3 manual muscle grade for all deficient areas in order to facilitate proper lifting mechanics 3 Initial 3/28/2022    Patient will reduce pain level to 2/10 2 Initial 3/28/2022    Patient will be able to ascend/descend stairs with no increased pain. 2 Initial 3/28/2022      Long Term Goals (LTG) # weeks Goal Review Date Reviewed Date Met   The patient will improve the Lower Extremity Functional Scale to less than 31% Disability 6 Initial 3/28/2022    The patient will increase bilateral lower extremity strength to greater than or equal to 1 manual muscle grade for all deficient areas in order to get in and out of vehicle with no limitations 6 Initial 3/28/2022    The patient will increase bilateral hip ROM to WNL in order to improve stride length for more normalized  gait pattern. 6 Initial 3/28/2022    Pt will perform full day of work without pain. 6 Initial 3/28/2022    Pt will return to gym/sports/normal recreational activities  6 Initial 3/28/2022      Plan   Plan of care Certification: 3/28/2022 to 6/28/22.    Outpatient Physical Therapy 2 times weekly for 6 weeks to include the following interventions: Gait Training, Manual Therapy, Moist Heat/ Ice, Neuromuscular Re-ed, Self Care, Therapeutic Activities and Therapeutic Exercise.     Garrett Rawls, PT

## 2022-04-04 ENCOUNTER — CLINICAL SUPPORT (OUTPATIENT)
Dept: REHABILITATION | Facility: HOSPITAL | Age: 82
End: 2022-04-04
Payer: MEDICARE

## 2022-04-04 DIAGNOSIS — Z74.09 IMPAIRED FUNCTIONAL MOBILITY AND ACTIVITY TOLERANCE: ICD-10-CM

## 2022-04-04 DIAGNOSIS — R29.898 WEAKNESS OF RIGHT LOWER EXTREMITY: Primary | ICD-10-CM

## 2022-04-04 PROCEDURE — 97110 THERAPEUTIC EXERCISES: CPT | Mod: PN

## 2022-04-04 NOTE — PROGRESS NOTES
[facility]  Outpatient Physical Therapy Daily Treatment Note      Name: Alexa Otero  Clinic Number: [id number]  Visit Date: 4/4/2022    Therapy Diagnosis: No diagnosis found.    Physician: Kp Gutierres,*  Physician Orders: PT Eval and Treat  Medical Diagnosis from Referral:   M25.551 (ICD-10-CM) - Right hip pain   S72.141D (ICD-10-CM) - Closed fracture of femur, intertrochanteric, right, with routine healing, subsequent encounter      Evaluation Date: 3/28/2022  Authorization Period Start - Expiration: 03/25/22 - 12/31/22  Plan of Care Certification Period: 3/28/22 - 6/28/22  Progress Note Due: 4/28  Visit # / Visits authorized: 2/ 20  (2/12 POC)  FOTO: Issued Visit #: 1         PTA Visit #: 0/5     FOTO Due Visit 5 -  Follow up  FOTO Due Visit 10 - Follow up    Time In: 210  Time Out: 255  Total Billable Timed: 45 minutes  Total Billable Un-timed: 0 minutes    Precautions: Standard    Subjective     The patient presents to therapy says she had increased pain after first visit went away the following day    Response to previous treatment: Eval Only   Functional change: None so far    Pain: 1/10  Location: right upper legs     Objective       Alexa received therapeutic exercises to develop strength, endurance, ROM, flexibility and posture for  minutes including:  short arc quad 3# x 30  Hip ADD ball x 30  Hip ABD Green TheraBand x 30  POSTERIOR PELVIC TILT x 20  Bridges x 20  Hip IR/ER Red TheraBand x 20 ea NT  Bike x 5  Shuttle x 2 x 15  Step Ups NT  GS slant stretch 3 x30sec  + Hip 3 way Yellow TheraBand       Alexa received the following manual therapy techniques: Joint mobilizations and Soft tissue Mobilization were applied to the:  for  minutes, including:      Alexa participated in neuromuscular re-education activities to improve: Balance, Coordination, Sense, Proprioception and Posture for  minutes. The following activities were included:          Patient Education and  HEP     She was compliant with home exercise program.    Education provided:   - Continue with Home Exercise Program, walk as much as possible    Written Home Exercises Provided: Patient instructed to cont prior HEP.  Exercises were reviewed and Alexa was able to demonstrate them prior to the end of the session.  Alexa demonstrated fair  understanding of the education provided.     See EMR under Patient Instructions for exercises provided prior visit.    Assessment     The patient presents after ORIF hip a few months ago. She has lack of IR, weakness in her Lower Extremity and lack of flexibility. Did not tolerate exercises well first visit, was in pain that nigh. Held a few to see if she tolerates better, and will increase her as she can tolerate    Pt will continue to benefit from skilled outpatient physical therapy to address the deficits listed in the problem list box on initial evaluation, provide pt/family education and to maximize pt's level of independence in the home and community environment.     Alexa Is progressing well towards her goals.   Pt prognosis is Good.     Pt's spiritual, cultural and educational needs considered and pt agreeable to plan of care and goals.    Anticipated barriers to physical therapy: none    Goals:   Short Term Goals (STG) # weeks Goal Review Date Reviewed Date Met   Pt will demonstrate independence with initial HEP to facilitate therex progression and improvements in functional mobility 1 Initial 3/28/2022     The patient will increase bilateral lower extremity strength to greater than or equal to 1/3 manual muscle grade for all deficient areas in order to facilitate proper lifting mechanics 3 Initial 3/28/2022     Patient will reduce pain level to 2/10 2 Initial 3/28/2022     Patient will be able to ascend/descend stairs with no increased pain. 2 Initial 3/28/2022        Long Term Goals (LTG) # weeks Goal Review Date Reviewed Date Met   The patient will improve the Lower  Extremity Functional Scale to less than 31% Disability 6 Initial 3/28/2022     The patient will increase bilateral lower extremity strength to greater than or equal to 1 manual muscle grade for all deficient areas in order to get in and out of vehicle with no limitations 6 Initial 3/28/2022     The patient will increase bilateral hip ROM to WNL in order to improve stride length for more normalized gait pattern. 6 Initial 3/28/2022     Pt will perform full day of work without pain. 6 Initial 3/28/2022     Pt will return to gym/sports/normal recreational activities  6 Initial 3/28/2022            Plan     Continue with the plan of care established per initial evaluation    Garrett Rawls, PT

## 2022-04-06 ENCOUNTER — HOSPITAL ENCOUNTER (OUTPATIENT)
Dept: RADIOLOGY | Facility: HOSPITAL | Age: 82
Discharge: HOME | End: 2022-04-06
Attending: FAMILY MEDICINE
Payer: MEDICARE

## 2022-04-06 VITALS — WEIGHT: 134.06 LBS | HEIGHT: 63 IN | BODY MASS INDEX: 23.75 KG/M2

## 2022-04-06 DIAGNOSIS — Z12.31 SCREENING MAMMOGRAM, ENCOUNTER FOR: ICD-10-CM

## 2022-04-06 PROCEDURE — 77063 BREAST TOMOSYNTHESIS BI: CPT | Mod: TC,PO

## 2022-04-06 PROCEDURE — 77067 SCR MAMMO BI INCL CAD: CPT | Mod: TC,PO

## 2022-04-07 ENCOUNTER — CLINICAL SUPPORT (OUTPATIENT)
Dept: REHABILITATION | Facility: HOSPITAL | Age: 82
End: 2022-04-07
Payer: MEDICARE

## 2022-04-07 DIAGNOSIS — R29.898 WEAKNESS OF RIGHT LOWER EXTREMITY: Primary | ICD-10-CM

## 2022-04-07 DIAGNOSIS — Z74.09 IMPAIRED FUNCTIONAL MOBILITY AND ACTIVITY TOLERANCE: ICD-10-CM

## 2022-04-07 PROCEDURE — 97110 THERAPEUTIC EXERCISES: CPT | Mod: PN,CQ

## 2022-04-07 NOTE — PROGRESS NOTES
"[facility]  Outpatient Physical Therapy Daily Treatment Note      Name: Alexa Otero  Clinic Number: [id number]  Visit Date: 4/7/2022    Therapy Diagnosis:   Encounter Diagnoses   Name Primary?    Weakness of right lower extremity Yes    Impaired functional mobility and activity tolerance        Physician: Kp Gutierres,*  Physician Orders: PT Eval and Treat  Medical Diagnosis from Referral:   M25.551 (ICD-10-CM) - Right hip pain   S72.141D (ICD-10-CM) - Closed fracture of femur, intertrochanteric, right, with routine healing, subsequent encounter      Evaluation Date: 3/28/2022  Authorization Period Start - Expiration: 03/25/22 - 12/31/22  Plan of Care Certification Period: 3/28/22 - 6/28/22  Progress Note Due: 4/28  Visit # / Visits authorized: 2/ 20  (2/12 POC)  FOTO: Issued Visit #: 1         PTA Visit #: 1/5     FOTO Due Visit 5 -  Follow up  FOTO Due Visit 10 - Follow up    Time In: 245  Time Out: 323  Total Billable Timed: 38 minutes  Total Billable Un-timed: 0 minutes    Precautions: Standard    Subjective     The patient reports: doing alright upon arrival, has some right groin pain at times. Her right knee is also giving her trouble and has some swelling today.     Response to previous treatment: muscle soreness   Functional change: ongoing     Pain: 1/10  Location: right upper legs     Objective       Alexa received therapeutic exercises to develop strength, endurance, ROM, flexibility and posture for 38 minutes including:    Bike x 5 min     short arc quad 3# x 30  Hip ADD ball x 30  Hip ABD Green TheraBand x 30  POSTERIOR PELVIC TILT x 20  LTR x 20   DKTC with PB x 20   +Hip adductor stretch 3 x 30"  + hip flexor stretch off mat 3 x 30" RLE   Bridges x 20   +Piriformis stretch 2 x 30" B     Standing hip abduction B x 10   Standing calf raises x 15     Not performed today:  Step Ups   Hip IR/ER Red TheraBand x 20 ea        Alexa received the following manual therapy " techniques: Joint mobilizations and Soft tissue Mobilization were applied to the:  for  minutes, including:      Alexa participated in neuromuscular re-education activities to improve: Balance, Coordination, Sense, Proprioception and Posture for  minutes. The following activities were included:          Patient Education and HEP     She was compliant with home exercise program.    Education provided:   - Continue with Home Exercise Program, walk as much as possible, ice to right knee and elevate for swelling     Written Home Exercises Provided: Patient instructed to cont prior HEP.  Exercises were reviewed and Alexa was able to demonstrate them prior to the end of the session.  Alexa demonstrated fair  understanding of the education provided.     See EMR under Patient Instructions for exercises provided prior visit.    Assessment     Patient had better tolerance this visit without increase in discomfort. Hip weakness evident during exercises and gait along with tight hip adductors and psoas. Slight discomfort in right hip during resisted hip abduction in supine. Increase in right knee swelling noted at treatment and patient was educated on icing and elevating at home. Continue to progress as tolerated.     Pt will continue to benefit from skilled outpatient physical therapy to address the deficits listed in the problem list box on initial evaluation, provide pt/family education and to maximize pt's level of independence in the home and community environment.     Alexa Is progressing well towards her goals.   Pt prognosis is Good.     Pt's spiritual, cultural and educational needs considered and pt agreeable to plan of care and goals.    Anticipated barriers to physical therapy: none    Goals:   Short Term Goals (STG) # weeks Goal Review Date Reviewed Date Met   Pt will demonstrate independence with initial HEP to facilitate therex progression and improvements in functional mobility 1 Initial 3/28/2022     The  patient will increase bilateral lower extremity strength to greater than or equal to 1/3 manual muscle grade for all deficient areas in order to facilitate proper lifting mechanics 3 Initial 3/28/2022     Patient will reduce pain level to 2/10 2 Initial 3/28/2022     Patient will be able to ascend/descend stairs with no increased pain. 2 Initial 3/28/2022        Long Term Goals (LTG) # weeks Goal Review Date Reviewed Date Met   The patient will improve the Lower Extremity Functional Scale to less than 31% Disability 6 Initial 3/28/2022     The patient will increase bilateral lower extremity strength to greater than or equal to 1 manual muscle grade for all deficient areas in order to get in and out of vehicle with no limitations 6 Initial 3/28/2022     The patient will increase bilateral hip ROM to WNL in order to improve stride length for more normalized gait pattern. 6 Initial 3/28/2022     Pt will perform full day of work without pain. 6 Initial 3/28/2022     Pt will return to gym/sports/normal recreational activities  6 Initial 3/28/2022            Plan     Continue with the plan of care established per initial evaluation    Susy Euceda PTA

## 2022-04-08 PROBLEM — Z51.11 MAINTENANCE CHEMOTHERAPY: Status: ACTIVE | Noted: 2022-04-08

## 2022-04-11 ENCOUNTER — CLINICAL SUPPORT (OUTPATIENT)
Dept: REHABILITATION | Facility: HOSPITAL | Age: 82
End: 2022-04-11
Payer: MEDICARE

## 2022-04-11 ENCOUNTER — INFUSION (OUTPATIENT)
Dept: INFUSION THERAPY | Facility: HOSPITAL | Age: 82
End: 2022-04-11
Attending: OBSTETRICS & GYNECOLOGY
Payer: MEDICARE

## 2022-04-11 VITALS
DIASTOLIC BLOOD PRESSURE: 79 MMHG | HEART RATE: 88 BPM | SYSTOLIC BLOOD PRESSURE: 146 MMHG | TEMPERATURE: 97 F | WEIGHT: 135.88 LBS | BODY MASS INDEX: 24.07 KG/M2 | OXYGEN SATURATION: 100 %

## 2022-04-11 DIAGNOSIS — Z74.09 IMPAIRED FUNCTIONAL MOBILITY AND ACTIVITY TOLERANCE: ICD-10-CM

## 2022-04-11 DIAGNOSIS — C56.1 CARCINOMA OF RIGHT OVARY: ICD-10-CM

## 2022-04-11 DIAGNOSIS — R29.898 WEAKNESS OF RIGHT LOWER EXTREMITY: Primary | ICD-10-CM

## 2022-04-11 DIAGNOSIS — Z51.11 MAINTENANCE CHEMOTHERAPY: Primary | ICD-10-CM

## 2022-04-11 PROCEDURE — 25000003 PHARM REV CODE 250: Performed by: OBSTETRICS & GYNECOLOGY

## 2022-04-11 PROCEDURE — 63600175 PHARM REV CODE 636 W HCPCS: Performed by: OBSTETRICS & GYNECOLOGY

## 2022-04-11 PROCEDURE — 97110 THERAPEUTIC EXERCISES: CPT | Mod: PN

## 2022-04-11 PROCEDURE — 96523 IRRIG DRUG DELIVERY DEVICE: CPT

## 2022-04-11 PROCEDURE — A4216 STERILE WATER/SALINE, 10 ML: HCPCS | Performed by: OBSTETRICS & GYNECOLOGY

## 2022-04-11 RX ORDER — HEPARIN 100 UNIT/ML
500 SYRINGE INTRAVENOUS
Status: CANCELLED | OUTPATIENT
Start: 2022-04-11

## 2022-04-11 RX ORDER — HEPARIN 100 UNIT/ML
500 SYRINGE INTRAVENOUS
Status: DISCONTINUED | OUTPATIENT
Start: 2022-04-11 | End: 2022-04-11 | Stop reason: HOSPADM

## 2022-04-11 RX ORDER — SODIUM CHLORIDE 0.9 % (FLUSH) 0.9 %
10 SYRINGE (ML) INJECTION
Status: CANCELLED | OUTPATIENT
Start: 2022-04-11

## 2022-04-11 RX ORDER — SODIUM CHLORIDE 0.9 % (FLUSH) 0.9 %
10 SYRINGE (ML) INJECTION
Status: DISCONTINUED | OUTPATIENT
Start: 2022-04-11 | End: 2022-04-11 | Stop reason: HOSPADM

## 2022-04-11 RX ORDER — HEPARIN 100 UNIT/ML
SYRINGE INTRAVENOUS
Status: DISCONTINUED
Start: 2022-04-11 | End: 2022-04-11 | Stop reason: HOSPADM

## 2022-04-11 RX ADMIN — SODIUM CHLORIDE, PRESERVATIVE FREE 10 ML: 5 INJECTION INTRAVENOUS at 11:04

## 2022-04-11 RX ADMIN — Medication 500 UNITS: at 11:04

## 2022-04-11 NOTE — PROGRESS NOTES
"[facility]  Outpatient Physical Therapy Daily Treatment Note      Name: Alexa Otero  Clinic Number: [id number]  Visit Date: 4/11/2022    Therapy Diagnosis:   Encounter Diagnoses   Name Primary?    Weakness of right lower extremity Yes    Impaired functional mobility and activity tolerance        Physician: Kp Gutierres,*  Physician Orders: PT Eval and Treat  Medical Diagnosis from Referral:   M25.551 (ICD-10-CM) - Right hip pain   S72.141D (ICD-10-CM) - Closed fracture of femur, intertrochanteric, right, with routine healing, subsequent encounter      Evaluation Date: 3/28/2022  Authorization Period Start - Expiration: 03/25/22 - 12/31/22  Plan of Care Certification Period: 3/28/22 - 6/28/22  Progress Note Due: 4/28  Visit # / Visits authorized: 3/ 20  (3/12 POC)  FOTO: Issued Visit #: 1         PTA Visit #: 0/5     FOTO Due Visit 5 -  Follow up  FOTO Due Visit 10 - Follow up    Time In: 300  Time Out: 345  Total Billable Timed: 45 minutes  Total Billable Un-timed: 0 minutes    Precautions: Standard    Subjective     The patient reports: doing alright, but still having difficulty sleeping due to pain when lays down    Response to previous treatment: muscle soreness   Functional change: ongoing     Pain: 1/10  Location: right upper legs     Objective       Alexa received therapeutic exercises to develop strength, endurance, ROM, flexibility and posture for 45 minutes including:    Bike x 5 min     short arc quad 3# x 30  Hip ADD ball x 30  Hip ABD Green TheraBand x 30  POSTERIOR PELVIC TILT x 20  LTR x 20   DKTC with PB x 20   Hip adductor stretch 3 x 30"   hip flexor stretch off mat 3 x 30" RLE   Bridges x 20   Piriformis stretch 2 x 30" B     Standing hip abduction B x 10   Standing calf raises x 15   Step ups forward x 20  S/L balance 5x's as long as possible      Not performed today:    Hip IR/ER Red TheraBand x 20 ea        Alexa received the following manual therapy " techniques: Joint mobilizations and Soft tissue Mobilization were applied to the:  for  minutes, including:      Alexa participated in neuromuscular re-education activities to improve: Balance, Coordination, Sense, Proprioception and Posture for  minutes. The following activities were included:          Patient Education and HEP     She was compliant with home exercise program.    Education provided:   - Continue with Home Exercise Program, walk as much as possible, ice to right knee and elevate for swelling     Written Home Exercises Provided: Patient instructed to cont prior HEP.  Exercises were reviewed and Alexa was able to demonstrate them prior to the end of the session.  Alexa demonstrated fair  understanding of the education provided.     See EMR under Patient Instructions for exercises provided prior visit.    Assessment     Patient had better tolerance this visit without increase in discomfort. Hip weakness evident during exercises and gait along with tight hip adductors and psoas. Slight discomfort in right hip during resisted hip abduction in supine. Increase in right knee swelling noted at treatment and patient was educated on icing and elevating at home. Continue to progress as tolerated.     Pt will continue to benefit from skilled outpatient physical therapy to address the deficits listed in the problem list box on initial evaluation, provide pt/family education and to maximize pt's level of independence in the home and community environment.     Alexa Is progressing well towards her goals.   Pt prognosis is Good.     Pt's spiritual, cultural and educational needs considered and pt agreeable to plan of care and goals.    Anticipated barriers to physical therapy: none    Goals:   Short Term Goals (STG) # weeks Goal Review Date Reviewed Date Met   Pt will demonstrate independence with initial HEP to facilitate therex progression and improvements in functional mobility 1 Initial 3/28/2022     The  patient will increase bilateral lower extremity strength to greater than or equal to 1/3 manual muscle grade for all deficient areas in order to facilitate proper lifting mechanics 3 Initial 3/28/2022     Patient will reduce pain level to 2/10 2 Initial 3/28/2022     Patient will be able to ascend/descend stairs with no increased pain. 2 Initial 3/28/2022        Long Term Goals (LTG) # weeks Goal Review Date Reviewed Date Met   The patient will improve the Lower Extremity Functional Scale to less than 31% Disability 6 Initial 3/28/2022     The patient will increase bilateral lower extremity strength to greater than or equal to 1 manual muscle grade for all deficient areas in order to get in and out of vehicle with no limitations 6 Initial 3/28/2022     The patient will increase bilateral hip ROM to WNL in order to improve stride length for more normalized gait pattern. 6 Initial 3/28/2022     Pt will perform full day of work without pain. 6 Initial 3/28/2022     Pt will return to gym/sports/normal recreational activities  6 Initial 3/28/2022            Plan     Continue with the plan of care established per initial evaluation    Garrett Rawls, PT

## 2022-04-14 ENCOUNTER — CLINICAL SUPPORT (OUTPATIENT)
Dept: REHABILITATION | Facility: HOSPITAL | Age: 82
End: 2022-04-14
Payer: MEDICARE

## 2022-04-14 DIAGNOSIS — R29.898 WEAKNESS OF RIGHT LOWER EXTREMITY: Primary | ICD-10-CM

## 2022-04-14 DIAGNOSIS — Z74.09 IMPAIRED FUNCTIONAL MOBILITY AND ACTIVITY TOLERANCE: ICD-10-CM

## 2022-04-14 PROCEDURE — 97110 THERAPEUTIC EXERCISES: CPT | Mod: PN,CQ

## 2022-04-14 NOTE — PROGRESS NOTES
"[facility]  Outpatient Physical Therapy Daily Treatment Note      Name: Alexa Otero  Clinic Number: [id number]  Visit Date: 4/14/2022    Therapy Diagnosis:   Encounter Diagnoses   Name Primary?    Weakness of right lower extremity Yes    Impaired functional mobility and activity tolerance        Physician: Kp Gutierres,*  Physician Orders: PT Eval and Treat  Medical Diagnosis from Referral:   M25.551 (ICD-10-CM) - Right hip pain   S72.141D (ICD-10-CM) - Closed fracture of femur, intertrochanteric, right, with routine healing, subsequent encounter      Evaluation Date: 3/28/2022  Authorization Period Start - Expiration: 03/25/22 - 12/31/22  Plan of Care Certification Period: 3/28/22 - 6/28/22  Progress Note Due: 4/28  Visit # / Visits authorized: 4/ 20  (4/12 POC)  FOTO: Issued Visit #: 1       PTA Visit #: 1/5     FOTO Due Visit 5 -  Follow up  FOTO Due Visit 10 - Follow up    Time In: 245  Time Out: 325  Total Billable Timed: 40 minutes  Total Billable Un-timed: 0 minutes    Precautions: Standard    Subjective     The patient reports: her right groin is bothering her more today, but she did some fast walking the other day to get out of the rain. She slept really good the day after her last tx.     Response to previous treatment: slept good the night after tx    Functional change: ongoing     Pain: 1/10  Location: right upper legs     Objective       Alexa received therapeutic exercises to develop strength, endurance, ROM, flexibility and posture for 40 minutes including:    Recumbent bike x 5 min     Hip ADD ball x 30  PPT with Hip ABD Green TheraBand x 30  POSTERIOR PELVIC TILT x 20  LTR x 20   DKTC with PB x 20   Hip flexor stretch off mat 2 x 30"   Bridges small range x 20   Piriformis stretch push away 2 x 30" B     Standing hip abduction B 2 x 10   Standing calf raises x 15   Step ups forward at staircase x 20  Standing TKEs with ball at wall 15 x 3 sec hold   S/L " "balance 5x's as long as possible      Not performed today:  Hip IR/ER Red TheraBand x 20 ea  short arc quad 3# x 30  Hip adductor stretch 3 x 30"      Alexa received the following manual therapy techniques: Joint mobilizations and Soft tissue Mobilization were applied to the:  for  minutes, including:      Alexa participated in neuromuscular re-education activities to improve: Balance, Coordination, Sense, Proprioception and Posture for  minutes. The following activities were included:        Patient Education and HEP     She was compliant with home exercise program.    Education provided:   - Continue with Home Exercise Program, walk as much as possible, ice to right knee and elevate for swelling     Written Home Exercises Provided: Patient instructed to cont prior HEP.  Exercises were reviewed and Alexa was able to demonstrate them prior to the end of the session.  Alexa demonstrated fair  understanding of the education provided.     See EMR under Patient Instructions for exercises provided prior visit.    Assessment     Alexa continues to have right knee swelling noted with decreased extension upon arrival to session with increased right groin pain today. Patient tolerated exercises and stretches well. Challenged with right hip abduction strength and balance. Continue to progress as patient tolerates without exacerbating right knee swelling.     Pt will continue to benefit from skilled outpatient physical therapy to address the deficits listed in the problem list box on initial evaluation, provide pt/family education and to maximize pt's level of independence in the home and community environment.     Alexa Is progressing well towards her goals.   Pt prognosis is Good.     Pt's spiritual, cultural and educational needs considered and pt agreeable to plan of care and goals.    Anticipated barriers to physical therapy: none    Goals:   Short Term Goals (STG) # weeks Goal Review Date Reviewed Date Met   Pt will " demonstrate independence with initial HEP to facilitate therex progression and improvements in functional mobility 1 Initial 3/28/2022     The patient will increase bilateral lower extremity strength to greater than or equal to 1/3 manual muscle grade for all deficient areas in order to facilitate proper lifting mechanics 3 Initial 3/28/2022     Patient will reduce pain level to 2/10 2 Initial 3/28/2022     Patient will be able to ascend/descend stairs with no increased pain. 2 Initial 3/28/2022        Long Term Goals (LTG) # weeks Goal Review Date Reviewed Date Met   The patient will improve the Lower Extremity Functional Scale to less than 31% Disability 6 Initial 3/28/2022     The patient will increase bilateral lower extremity strength to greater than or equal to 1 manual muscle grade for all deficient areas in order to get in and out of vehicle with no limitations 6 Initial 3/28/2022     The patient will increase bilateral hip ROM to WNL in order to improve stride length for more normalized gait pattern. 6 Initial 3/28/2022     Pt will perform full day of work without pain. 6 Initial 3/28/2022     Pt will return to gym/sports/normal recreational activities  6 Initial 3/28/2022            Plan     Continue with the plan of care established per initial evaluation    Susy Euceda PTA

## 2022-04-18 ENCOUNTER — CLINICAL SUPPORT (OUTPATIENT)
Dept: REHABILITATION | Facility: HOSPITAL | Age: 82
End: 2022-04-18
Payer: MEDICARE

## 2022-04-18 DIAGNOSIS — Z74.09 IMPAIRED FUNCTIONAL MOBILITY AND ACTIVITY TOLERANCE: ICD-10-CM

## 2022-04-18 DIAGNOSIS — R29.898 WEAKNESS OF RIGHT LOWER EXTREMITY: Primary | ICD-10-CM

## 2022-04-18 PROCEDURE — 97112 NEUROMUSCULAR REEDUCATION: CPT | Mod: PN

## 2022-04-18 PROCEDURE — 97110 THERAPEUTIC EXERCISES: CPT | Mod: PN

## 2022-04-18 NOTE — PROGRESS NOTES
"[facility]  Outpatient Physical Therapy Daily Treatment Note      Name: Alexa Otero  Clinic Number: [id number]  Visit Date: 4/18/2022    Therapy Diagnosis:   Encounter Diagnoses   Name Primary?    Weakness of right lower extremity Yes    Impaired functional mobility and activity tolerance        Physician: Kp Gutierres,*  Physician Orders: PT Eval and Treat  Medical Diagnosis from Referral:   M25.551 (ICD-10-CM) - Right hip pain   S72.141D (ICD-10-CM) - Closed fracture of femur, intertrochanteric, right, with routine healing, subsequent encounter      Evaluation Date: 3/28/2022  Authorization Period Start - Expiration: 03/25/22 - 12/31/22  Plan of Care Certification Period: 3/28/22 - 6/28/22  Progress Note Due: 4/28  Visit # / Visits authorized: 4/ 20  (4/12 POC)  FOTO: Issued Visit #: 1       PTA Visit #: 0/5     FOTO Due Visit 5 -  Follow up  FOTO Due Visit 10 - Follow up    Time In: 215  Time Out: 300  Total Billable Timed: 45 minutes  Total Billable Un-timed: 0 minutes    Precautions: Standard    Subjective     The patient reports: Groin still sore, but is sleeping better    Response to previous treatment: slept good the night after tx    Functional change: Sleeping better and walking with less discomfort    Pain: 1/10  Location: right upper legs     Objective       Alexa received therapeutic exercises to develop strength, endurance, ROM, flexibility and posture for 40 minutes including:    Recumbent bike x 5 min     Hip ADD ball x 30  PPT with Hip ABD Green TheraBand x 30  POSTERIOR PELVIC TILT x 20  LTR x 20   DKTC with PB x 20   Hip flexor stretch off mat 2 x 30"   Bridges small range x 20   Piriformis stretch push away 2 x 30" B     Shuttle 37 # x 20, inc weight next visit  Shuttle R leg 12# x 20    Standing hip abduction B 2 x 10   Standing calf raises x 15   Step ups forward at staircase x 20  Standing TKEs with ball at wall 15 x 3 sec hold   S/L balance 5x's as " "long as possible      Not performed today:  Hip IR/ER Red TheraBand x 20 ea  short arc quad 3# x 30  Hip adductor stretch 3 x 30"      Alexa received the following manual therapy techniques: Joint mobilizations and Soft tissue Mobilization were applied to the:  for  minutes, including:      Alexa participated in neuromuscular re-education activities to improve: Balance, Coordination, Sense, Proprioception and Posture for  minutes. The following activities were included:        Patient Education and HEP     She was compliant with home exercise program.    Education provided:   - Continue with Home Exercise Program, walk as much as possible, ice to right knee and elevate for swelling     Written Home Exercises Provided: Patient instructed to cont prior HEP.  Exercises were reviewed and Alexa was able to demonstrate them prior to the end of the session.  Alexa demonstrated fair  understanding of the education provided.     See EMR under Patient Instructions for exercises provided prior visit.    Assessment     Alexa continues to have right knee swelling noted with decreased extension upon arrival to session with increased right groin pain today. Patient tolerated exercises and stretches well. Challenged with right hip abduction strength and balance. Continue to progress as patient tolerates without exacerbating right knee swelling.     Pt will continue to benefit from skilled outpatient physical therapy to address the deficits listed in the problem list box on initial evaluation, provide pt/family education and to maximize pt's level of independence in the home and community environment.     Alexa Is progressing well towards her goals.   Pt prognosis is Good.     Pt's spiritual, cultural and educational needs considered and pt agreeable to plan of care and goals.    Anticipated barriers to physical therapy: none    Goals:   Short Term Goals (STG) # weeks Goal Review Date Reviewed Date Met   Pt will demonstrate " independence with initial HEP to facilitate therex progression and improvements in functional mobility 1 Initial 3/28/2022     The patient will increase bilateral lower extremity strength to greater than or equal to 1/3 manual muscle grade for all deficient areas in order to facilitate proper lifting mechanics 3 Initial 3/28/2022     Patient will reduce pain level to 2/10 2 Initial 3/28/2022     Patient will be able to ascend/descend stairs with no increased pain. 2 Initial 3/28/2022        Long Term Goals (LTG) # weeks Goal Review Date Reviewed Date Met   The patient will improve the Lower Extremity Functional Scale to less than 31% Disability 6 Initial 3/28/2022     The patient will increase bilateral lower extremity strength to greater than or equal to 1 manual muscle grade for all deficient areas in order to get in and out of vehicle with no limitations 6 Initial 3/28/2022     The patient will increase bilateral hip ROM to WNL in order to improve stride length for more normalized gait pattern. 6 Initial 3/28/2022     Pt will perform full day of work without pain. 6 Initial 3/28/2022     Pt will return to gym/sports/normal recreational activities  6 Initial 3/28/2022            Plan     Continue with the plan of care established per initial evaluation    Garrett Rawls, PT

## 2022-04-21 ENCOUNTER — CLINICAL SUPPORT (OUTPATIENT)
Dept: REHABILITATION | Facility: HOSPITAL | Age: 82
End: 2022-04-21
Payer: MEDICARE

## 2022-04-21 DIAGNOSIS — Z74.09 IMPAIRED FUNCTIONAL MOBILITY AND ACTIVITY TOLERANCE: ICD-10-CM

## 2022-04-21 DIAGNOSIS — R29.898 WEAKNESS OF RIGHT LOWER EXTREMITY: Primary | ICD-10-CM

## 2022-04-21 PROCEDURE — 97110 THERAPEUTIC EXERCISES: CPT | Mod: PN,CQ

## 2022-04-21 NOTE — PROGRESS NOTES
"[facility]  Outpatient Physical Therapy Daily Treatment Note      Name: Alexa Otero  Clinic Number: [id number]  Visit Date: 4/21/2022    Therapy Diagnosis:   Encounter Diagnoses   Name Primary?    Weakness of right lower extremity Yes    Impaired functional mobility and activity tolerance        Physician: Kp Gutierres,*  Physician Orders: PT Eval and Treat  Medical Diagnosis from Referral:   M25.551 (ICD-10-CM) - Right hip pain   S72.141D (ICD-10-CM) - Closed fracture of femur, intertrochanteric, right, with routine healing, subsequent encounter      Evaluation Date: 3/28/2022  Authorization Period Start - Expiration: 03/25/22 - 12/31/22  Plan of Care Certification Period: 3/28/22 - 6/28/22  Progress Note Due: 4/28  Visit # / Visits authorized: 5/ 20  (5/12 POC)  FOTO: Issued Visit #: 1       PTA Visit #: 0/5     FOTO Due Visit 5 -  Follow up  FOTO Due Visit 10 - Follow up    Time In: 245  Time Out: 325  Total Billable Timed: 40 minutes  Total Billable Un-timed: 0 minutes    Precautions: Standard    Subjective     The patient reports: had difficulty sleeping last night, but the days prior were really good.   Response to previous treatment: no issues   Functional change: Sleeping better and walking with less discomfort    Pain: 2/10  Location: right hip     Objective       Alexa received therapeutic exercises to develop strength, endurance, ROM, flexibility and posture for 40 minutes including:    Recumbent bike x 5 min     Hip ADD ball x 30  PPT with Hip ABD Green TheraBand x 30  POSTERIOR PELVIC TILT x 20  LTR x 20   DKTC with PB x 20   Bridges small range x 20   Hip flexor stretch off mat 2 x 30"   Piriformis stretch push away 2 x 30" B     Shuttle 50 # x 20,    Shuttle R leg 12# x 20    Standing hip abduction B 2 x 10   Standing calf raises x 15   Step ups forward at staircase x 20  S/L balance 5x's as long as possible        Alexa participated in neuromuscular " re-education activities to improve: Balance, Coordination, Sense, Proprioception and Posture for  minutes. The following activities were included:        Patient Education and HEP     She was compliant with home exercise program.    Education provided:   - Continue with Home Exercise Program, walk as much as possible, ice to right knee and elevate for swelling     Written Home Exercises Provided: Patient instructed to cont prior HEP.  Exercises were reviewed and Alexa was able to demonstrate them prior to the end of the session.  Alexa demonstrated fair  understanding of the education provided.     See EMR under Patient Instructions for exercises provided prior visit.    Assessment     Patient had a minor flare up last night with discomfort while sleeping when laying on right hip. Tolerated exercises well today and left session with decreased pain. Alexa was able to perform step ups with focus on RLE and had improved balance with SLS.  Patient continues to have slight antalgic gait with decreased weightbearing on RLE.     Pt will continue to benefit from skilled outpatient physical therapy to address the deficits listed in the problem list box on initial evaluation, provide pt/family education and to maximize pt's level of independence in the home and community environment.     Alexa Is progressing well towards her goals.   Pt prognosis is Good.     Pt's spiritual, cultural and educational needs considered and pt agreeable to plan of care and goals.    Anticipated barriers to physical therapy: none    Goals:   Short Term Goals (STG) # weeks Goal Review Date Reviewed Date Met   Pt will demonstrate independence with initial HEP to facilitate therex progression and improvements in functional mobility 1 Initial 3/28/2022     The patient will increase bilateral lower extremity strength to greater than or equal to 1/3 manual muscle grade for all deficient areas in order to facilitate proper lifting mechanics 3 Initial  3/28/2022     Patient will reduce pain level to 2/10 2 Initial 3/28/2022     Patient will be able to ascend/descend stairs with no increased pain. 2 Initial 3/28/2022        Long Term Goals (LTG) # weeks Goal Review Date Reviewed Date Met   The patient will improve the Lower Extremity Functional Scale to less than 31% Disability 6 Initial 3/28/2022     The patient will increase bilateral lower extremity strength to greater than or equal to 1 manual muscle grade for all deficient areas in order to get in and out of vehicle with no limitations 6 Initial 3/28/2022     The patient will increase bilateral hip ROM to WNL in order to improve stride length for more normalized gait pattern. 6 Initial 3/28/2022     Pt will perform full day of work without pain. 6 Initial 3/28/2022     Pt will return to gym/sports/normal recreational activities  6 Initial 3/28/2022            Plan     Continue with the plan of care established per initial evaluation    Susy Euceda PTA

## 2022-04-25 ENCOUNTER — CLINICAL SUPPORT (OUTPATIENT)
Dept: REHABILITATION | Facility: HOSPITAL | Age: 82
End: 2022-04-25
Payer: MEDICARE

## 2022-04-25 DIAGNOSIS — R29.898 WEAKNESS OF RIGHT LOWER EXTREMITY: Primary | ICD-10-CM

## 2022-04-25 DIAGNOSIS — Z74.09 IMPAIRED FUNCTIONAL MOBILITY AND ACTIVITY TOLERANCE: ICD-10-CM

## 2022-04-25 PROCEDURE — 97110 THERAPEUTIC EXERCISES: CPT | Mod: PN

## 2022-04-25 NOTE — PROGRESS NOTES
"[facility]  Outpatient Physical Therapy Daily Treatment Note      Name: Alexa Otero  Clinic Number: [id number]  Visit Date: 4/25/2022    Therapy Diagnosis:   Encounter Diagnoses   Name Primary?    Weakness of right lower extremity Yes    Impaired functional mobility and activity tolerance        Physician: Kp Gutierres,*  Physician Orders: PT Eval and Treat  Medical Diagnosis from Referral:   M25.551 (ICD-10-CM) - Right hip pain   S72.141D (ICD-10-CM) - Closed fracture of femur, intertrochanteric, right, with routine healing, subsequent encounter      Evaluation Date: 3/28/2022  Authorization Period Start - Expiration: 03/25/22 - 12/31/22  Plan of Care Certification Period: 3/28/22 - 6/28/22  Progress Note Due: 4/28  Visit # / Visits authorized: 9/ 20  (9/12 POC)  FOTO: Issued Visit #: 1       PTA Visit #: 0/5     FOTO Due Visit 5 -  Follow up  FOTO Due Visit 10 - Follow up    Time In: 215  Time Out: 300  Total Billable Timed: 45 minutes  Total Billable Un-timed: 0 minutes    Precautions: Standard    Subjective     The patient reports: leg has been sore for 2 days reason unknown  Response to previous treatment: no issues   Functional change: Sleeping better and walking with less discomfort    Pain: 2/10  Location: right hip     Objective       Alexa received therapeutic exercises to develop strength, endurance, ROM, flexibility and posture for 45 minutes including:    Recumbent bike x 5 min     Hip ADD ball x 30  PPT with Hip ABD Green TheraBand x 30  POSTERIOR PELVIC TILT x 20  LTR x 20   DKTC with PB x 20   Bridges small range x 20   Hip flexor stretch off mat 2 x 30"   Piriformis stretch push away 2 x 30" B     Shuttle 50 # x 20,    Shuttle R leg 12# x 20  Shuttle donkey kicks R Lower Extremity no Wt x 20    Standing hip abduction B 2 x 10   Standing calf raises x 15   Step ups forward at staircase x 20  S/L balance 5x's as long as possible  CC walk back 10# x " 5        Alexa participated in neuromuscular re-education activities to improve: Balance, Coordination, Sense, Proprioception and Posture for  minutes. The following activities were included:        Patient Education and HEP     She was compliant with home exercise program.    Education provided:   - Continue with Home Exercise Program, walk as much as possible, ice to right knee and elevate for swelling     Written Home Exercises Provided: Patient instructed to cont prior HEP.  Exercises were reviewed and Alexa was able to demonstrate them prior to the end of the session.  Alexa demonstrated fair  understanding of the education provided.     See EMR under Patient Instructions for exercises provided prior visit.    Assessment     Patient had some lack of IR and guarding hamstring today, was better after manual stretching /Range of Motion . Tolerated exercises well today and left session with decreased pain. Alexa was able to perform step ups with focus on RLE and had improved balance with SLS.  Patient continues to have slight antalgic gait with decreased weightbearing on RLE.     Pt will continue to benefit from skilled outpatient physical therapy to address the deficits listed in the problem list box on initial evaluation, provide pt/family education and to maximize pt's level of independence in the home and community environment.     Alexa Is progressing well towards her goals.   Pt prognosis is Good.     Pt's spiritual, cultural and educational needs considered and pt agreeable to plan of care and goals.    Anticipated barriers to physical therapy: none    Goals:   Short Term Goals (STG) # weeks Goal Review Date Reviewed Date Met   Pt will demonstrate independence with initial HEP to facilitate therex progression and improvements in functional mobility 1 Initial 3/28/2022     The patient will increase bilateral lower extremity strength to greater than or equal to 1/3 manual muscle grade for all deficient areas  in order to facilitate proper lifting mechanics 3 Initial 3/28/2022     Patient will reduce pain level to 2/10 2 Initial 3/28/2022     Patient will be able to ascend/descend stairs with no increased pain. 2 Initial 3/28/2022        Long Term Goals (LTG) # weeks Goal Review Date Reviewed Date Met   The patient will improve the Lower Extremity Functional Scale to less than 31% Disability 6 Initial 3/28/2022     The patient will increase bilateral lower extremity strength to greater than or equal to 1 manual muscle grade for all deficient areas in order to get in and out of vehicle with no limitations 6 Initial 3/28/2022     The patient will increase bilateral hip ROM to WNL in order to improve stride length for more normalized gait pattern. 6 Initial 3/28/2022     Pt will perform full day of work without pain. 6 Initial 3/28/2022     Pt will return to gym/sports/normal recreational activities  6 Initial 3/28/2022            Plan     Continue with the plan of care established per initial evaluation    Garrett Rawls, PT

## 2022-04-28 ENCOUNTER — CLINICAL SUPPORT (OUTPATIENT)
Dept: REHABILITATION | Facility: HOSPITAL | Age: 82
End: 2022-04-28
Payer: MEDICARE

## 2022-04-28 ENCOUNTER — LAB VISIT (OUTPATIENT)
Dept: LAB | Facility: HOSPITAL | Age: 82
End: 2022-04-28
Attending: FAMILY MEDICINE
Payer: MEDICARE

## 2022-04-28 DIAGNOSIS — D75.1 POLYCYTHEMIA, SECONDARY: ICD-10-CM

## 2022-04-28 DIAGNOSIS — E55.9 VITAMIN D DEFICIENCY DISEASE: ICD-10-CM

## 2022-04-28 DIAGNOSIS — R29.898 WEAKNESS OF RIGHT LOWER EXTREMITY: Primary | ICD-10-CM

## 2022-04-28 DIAGNOSIS — Z74.09 IMPAIRED FUNCTIONAL MOBILITY AND ACTIVITY TOLERANCE: ICD-10-CM

## 2022-04-28 DIAGNOSIS — E11.9 CONTROLLED TYPE 2 DIABETES MELLITUS WITHOUT COMPLICATION, WITHOUT LONG-TERM CURRENT USE OF INSULIN: ICD-10-CM

## 2022-04-28 DIAGNOSIS — E78.5 HYPERLIPIDEMIA, UNSPECIFIED HYPERLIPIDEMIA TYPE: ICD-10-CM

## 2022-04-28 LAB
BASOPHILS # BLD AUTO: 0.05 K/UL (ref 0–0.2)
BASOPHILS NFR BLD: 0.8 % (ref 0–1.9)
DIFFERENTIAL METHOD: ABNORMAL
EOSINOPHIL # BLD AUTO: 0.3 K/UL (ref 0–0.5)
EOSINOPHIL NFR BLD: 4.5 % (ref 0–8)
ERYTHROCYTE [DISTWIDTH] IN BLOOD BY AUTOMATED COUNT: 14.7 % (ref 11.5–14.5)
HCT VFR BLD AUTO: 37.4 % (ref 37–48.5)
HGB BLD-MCNC: 11.5 G/DL (ref 12–16)
IMM GRANULOCYTES # BLD AUTO: 0.01 K/UL (ref 0–0.04)
IMM GRANULOCYTES NFR BLD AUTO: 0.2 % (ref 0–0.5)
LYMPHOCYTES # BLD AUTO: 1.6 K/UL (ref 1–4.8)
LYMPHOCYTES NFR BLD: 26.3 % (ref 18–48)
MCH RBC QN AUTO: 28.2 PG (ref 27–31)
MCHC RBC AUTO-ENTMCNC: 30.7 G/DL (ref 32–36)
MCV RBC AUTO: 92 FL (ref 82–98)
MONOCYTES # BLD AUTO: 0.4 K/UL (ref 0.3–1)
MONOCYTES NFR BLD: 7 % (ref 4–15)
NEUTROPHILS # BLD AUTO: 3.8 K/UL (ref 1.8–7.7)
NEUTROPHILS NFR BLD: 61.2 % (ref 38–73)
NRBC BLD-RTO: 0 /100 WBC
PLATELET # BLD AUTO: 246 K/UL (ref 150–450)
PMV BLD AUTO: 10 FL (ref 9.2–12.9)
RBC # BLD AUTO: 4.08 M/UL (ref 4–5.4)
WBC # BLD AUTO: 6.17 K/UL (ref 3.9–12.7)

## 2022-04-28 PROCEDURE — 80053 COMPREHEN METABOLIC PANEL: CPT | Performed by: FAMILY MEDICINE

## 2022-04-28 PROCEDURE — 83036 HEMOGLOBIN GLYCOSYLATED A1C: CPT | Performed by: FAMILY MEDICINE

## 2022-04-28 PROCEDURE — 80061 LIPID PANEL: CPT | Performed by: FAMILY MEDICINE

## 2022-04-28 PROCEDURE — 85025 COMPLETE CBC W/AUTO DIFF WBC: CPT | Performed by: FAMILY MEDICINE

## 2022-04-28 PROCEDURE — 97110 THERAPEUTIC EXERCISES: CPT | Mod: PN,CQ

## 2022-04-28 PROCEDURE — 82306 VITAMIN D 25 HYDROXY: CPT | Performed by: FAMILY MEDICINE

## 2022-04-28 PROCEDURE — 36415 COLL VENOUS BLD VENIPUNCTURE: CPT | Mod: PO | Performed by: FAMILY MEDICINE

## 2022-04-28 NOTE — PROGRESS NOTES
"[facility]  Outpatient Physical Therapy Daily Treatment Note      Name: Alexa Otero  Clinic Number: [id number]  Visit Date: 4/28/2022    Therapy Diagnosis:   Encounter Diagnoses   Name Primary?    Weakness of right lower extremity Yes    Impaired functional mobility and activity tolerance        Physician: Kp Gutierres,*  Physician Orders: PT Eval and Treat  Medical Diagnosis from Referral:   M25.551 (ICD-10-CM) - Right hip pain   S72.141D (ICD-10-CM) - Closed fracture of femur, intertrochanteric, right, with routine healing, subsequent encounter      Evaluation Date: 3/28/2022  Authorization Period Start - Expiration: 03/25/22 - 12/31/22  Plan of Care Certification Period: 3/28/22 - 6/28/22  Progress Note Due: 4/28  Visit # / Visits authorized: 8/ 20  (8/12 POC)  FOTO: Issued Visit #: 1, 5     PTA Visit #: 0/5     FOTO Due Visit 5 -  Follow up  FOTO Due Visit 10 - Follow up    Time In: 237  Time Out: 330  Total Billable Timed: 53 minutes  Total Billable Un-timed: 0 minutes    Precautions: Standard    Subjective     The patient reports: she has no pain upon arrival. Sleeping better and through the whole night without interruptions from pain. She feels like the past few sessions have really helped her.   Response to previous treatment: soreness  Functional change: Still some minor discomfort when bending over and/or stepping at times     Pain: 0/10  Location: right hip     Objective       Alexa received therapeutic exercises to develop strength, endurance, ROM, flexibility and posture for 53 minutes including:    Recumbent bike x 5 min     Hip ADD ball x 30  PPT with Hip ABD Green TheraBand x 30  POSTERIOR PELVIC TILT x 20  LTR x 20   DKTC with PB x 30  Bridges small range x 20   Hip flexor stretch off mat 2 x 30"   Piriformis stretch push away 2 x 30" B     Shuttle 50 # x 20,    Shuttle R leg 12# x 20  Shuttle donkey kicks R Lower Extremity no Wt x 20    Standing hip " abduction B 2 x 10 -- add TB next visit   Standing calf raises x 20  Step ups forward at staircase x 20   S/L balance 5x's as long as possible  CC walk back 10# x 5        Alexa participated in neuromuscular re-education activities to improve: Balance, Coordination, Sense, Proprioception and Posture for  minutes. The following activities were included:        Patient Education and HEP     She was compliant with home exercise program.    Education provided:   - Continue with Home Exercise Program, walk as much as possible, ice to right knee and elevate for swelling     Written Home Exercises Provided: Patient instructed to cont prior HEP.  Exercises were reviewed and Alexa was able to demonstrate them prior to the end of the session.  Alexa demonstrated fair  understanding of the education provided.     See EMR under Patient Instructions for exercises provided prior visit.    Assessment     Alexa is showing improvements with hip strength and decreased pain in the right hip. HEP progressed this session to increase carry over to sessions. Progress strength and balance as appropriate.     Pt will continue to benefit from skilled outpatient physical therapy to address the deficits listed in the problem list box on initial evaluation, provide pt/family education and to maximize pt's level of independence in the home and community environment.     Alexa Is progressing well towards her goals.   Pt prognosis is Good.     Pt's spiritual, cultural and educational needs considered and pt agreeable to plan of care and goals.    Anticipated barriers to physical therapy: none    Goals:   Short Term Goals (STG) # weeks Goal Review Date Reviewed Date Met   Pt will demonstrate independence with initial HEP to facilitate therex progression and improvements in functional mobility 1 4/28/2022   3/28/2022     The patient will increase bilateral lower extremity strength to greater than or equal to 1/3 manual muscle grade for all deficient  areas in order to facilitate proper lifting mechanics 3 4/28/2022   3/28/2022     Patient will reduce pain level to 2/10 2 4/28/2022   3/28/2022     Patient will be able to ascend/descend stairs with no increased pain. 2 4/28/2022   3/28/2022        Long Term Goals (LTG) # weeks Goal Review Date Reviewed Date Met   The patient will improve the Lower Extremity Functional Scale to less than 31% Disability 6 4/28/2022    3/28/2022     The patient will increase bilateral lower extremity strength to greater than or equal to 1 manual muscle grade for all deficient areas in order to get in and out of vehicle with no limitations 6 4/28/2022   3/28/2022     The patient will increase bilateral hip ROM to WNL in order to improve stride length for more normalized gait pattern. 6 4/28/2022   3/28/2022     Pt will perform full day of work without pain. 6 4/28/2022   3/28/2022     Pt will return to gym/sports/normal recreational activities  6 4/28/2022   3/28/2022            Plan     Continue with the plan of care established per initial evaluation    Susy Euceda PTA

## 2022-04-29 LAB
25(OH)D3+25(OH)D2 SERPL-MCNC: 27 NG/ML (ref 30–96)
ALBUMIN SERPL BCP-MCNC: 4.1 G/DL (ref 3.5–5.2)
ALP SERPL-CCNC: 67 U/L (ref 55–135)
ALT SERPL W/O P-5'-P-CCNC: 7 U/L (ref 10–44)
ANION GAP SERPL CALC-SCNC: 12 MMOL/L (ref 8–16)
AST SERPL-CCNC: 14 U/L (ref 10–40)
BILIRUB SERPL-MCNC: 0.4 MG/DL (ref 0.1–1)
BUN SERPL-MCNC: 32 MG/DL (ref 8–23)
CALCIUM SERPL-MCNC: 9.7 MG/DL (ref 8.7–10.5)
CHLORIDE SERPL-SCNC: 102 MMOL/L (ref 95–110)
CHOLEST SERPL-MCNC: 177 MG/DL (ref 120–199)
CHOLEST/HDLC SERPL: 3.6 {RATIO} (ref 2–5)
CO2 SERPL-SCNC: 31 MMOL/L (ref 23–29)
CREAT SERPL-MCNC: 1.4 MG/DL (ref 0.5–1.4)
EST. GFR  (AFRICAN AMERICAN): 40.6 ML/MIN/1.73 M^2
EST. GFR  (NON AFRICAN AMERICAN): 35.3 ML/MIN/1.73 M^2
ESTIMATED AVG GLUCOSE: 120 MG/DL (ref 68–131)
GLUCOSE SERPL-MCNC: 95 MG/DL (ref 70–110)
HBA1C MFR BLD: 5.8 % (ref 4–5.6)
HDLC SERPL-MCNC: 49 MG/DL (ref 40–75)
HDLC SERPL: 27.7 % (ref 20–50)
LDLC SERPL CALC-MCNC: 108.2 MG/DL (ref 63–159)
NONHDLC SERPL-MCNC: 128 MG/DL
POTASSIUM SERPL-SCNC: 3.9 MMOL/L (ref 3.5–5.1)
PROT SERPL-MCNC: 7.1 G/DL (ref 6–8.4)
SODIUM SERPL-SCNC: 145 MMOL/L (ref 136–145)
TRIGL SERPL-MCNC: 99 MG/DL (ref 30–150)

## 2022-05-02 ENCOUNTER — CLINICAL SUPPORT (OUTPATIENT)
Dept: REHABILITATION | Facility: HOSPITAL | Age: 82
End: 2022-05-02
Payer: MEDICARE

## 2022-05-02 DIAGNOSIS — R29.898 WEAKNESS OF RIGHT LOWER EXTREMITY: Primary | ICD-10-CM

## 2022-05-02 DIAGNOSIS — Z74.09 IMPAIRED FUNCTIONAL MOBILITY AND ACTIVITY TOLERANCE: ICD-10-CM

## 2022-05-02 PROCEDURE — 97110 THERAPEUTIC EXERCISES: CPT | Mod: PN

## 2022-05-02 NOTE — PROGRESS NOTES
"[facility]  Outpatient Physical Therapy Daily Treatment Note      Name: Alexa Otero  Clinic Number: [id number]  Visit Date: 5/2/2022    Therapy Diagnosis:   Encounter Diagnoses   Name Primary?    Weakness of right lower extremity Yes    Impaired functional mobility and activity tolerance        Physician: Kp Gutierres,*  Physician Orders: PT Eval and Treat  Medical Diagnosis from Referral:   M25.551 (ICD-10-CM) - Right hip pain   S72.141D (ICD-10-CM) - Closed fracture of femur, intertrochanteric, right, with routine healing, subsequent encounter      Evaluation Date: 3/28/2022  Authorization Period Start - Expiration: 03/25/22 - 12/31/22  Plan of Care Certification Period: 3/28/22 - 6/28/22  Progress Note Due: 4/28  Visit # / Visits authorized: 8/ 20  (8/12 POC)  FOTO: Issued Visit #: 1, 5     PTA Visit #: 0/5     FOTO Due Visit 5 -  Follow up  FOTO Due Visit 10 - Follow up    Time In: 215  Time Out: 300  Total Billable Timed: 45 minutes  Total Billable Un-timed: 0 minutes    Precautions: Standard    Subjective     The patient reports: she has some posterior pain today, does not know why, couldn't sleep.  Response to previous treatment: soreness  Functional change: Still some minor discomfort when bending over and/or stepping at times     Pain: 2/10  Location: right hip     Objective       Alexa received therapeutic exercises to develop strength, endurance, ROM, flexibility and posture for 45 minutes including:    Recumbent bike x 5 min     Hip ADD ball x 30  PPT with Hip ABD Green TheraBand x 30  POSTERIOR PELVIC TILT x 20  LTR x 20   DKTC with PB x 30  Bridges small range x 20   Hip flexor stretch off mat 2 x 30"   Piriformis stretch push away 2 x 30" B     Shuttle 50 # x 20,    Shuttle R leg 12# x 20  Shuttle donkey kicks R Lower Extremity no Wt x 20    Standing hip abduction B 2 x 10 -- add TB next visit   Standing calf raises x 20  Step ups forward at staircase x 20 "   S/L balance 5x's as long as possible  CC walk back 10# x 5        Alexa participated in neuromuscular re-education activities to improve: Balance, Coordination, Sense, Proprioception and Posture for  minutes. The following activities were included:        Patient Education and HEP     She was compliant with home exercise program.    Education provided:   - Continue with Home Exercise Program, walk as much as possible, ice to right knee and elevate for swelling     Written Home Exercises Provided: Patient instructed to cont prior HEP.  Exercises were reviewed and Alexa was able to demonstrate them prior to the end of the session.  Alexa demonstrated fair  understanding of the education provided.     See EMR under Patient Instructions for exercises provided prior visit.    Assessment     Alexa is showing improvements with hip strength and decreased pain in the right hip. HEP progressed this session to increase carry over to sessions. Progress strength and balance as appropriate. She had guarding of her hip muscles, felt better after manual stretching    Pt will continue to benefit from skilled outpatient physical therapy to address the deficits listed in the problem list box on initial evaluation, provide pt/family education and to maximize pt's level of independence in the home and community environment.     Alexa Is progressing well towards her goals.   Pt prognosis is Good.     Pt's spiritual, cultural and educational needs considered and pt agreeable to plan of care and goals.    Anticipated barriers to physical therapy: none    Goals:   Short Term Goals (STG) # weeks Goal Review Date Reviewed Date Met   Pt will demonstrate independence with initial HEP to facilitate therex progression and improvements in functional mobility 1 5/2/2022   3/28/2022     The patient will increase bilateral lower extremity strength to greater than or equal to 1/3 manual muscle grade for all deficient areas in order to facilitate  proper lifting mechanics 3 5/2/2022   3/28/2022     Patient will reduce pain level to 2/10 2 5/2/2022   3/28/2022     Patient will be able to ascend/descend stairs with no increased pain. 2 5/2/2022   3/28/2022        Long Term Goals (LTG) # weeks Goal Review Date Reviewed Date Met   The patient will improve the Lower Extremity Functional Scale to less than 31% Disability 6 5/2/2022    3/28/2022     The patient will increase bilateral lower extremity strength to greater than or equal to 1 manual muscle grade for all deficient areas in order to get in and out of vehicle with no limitations 6 5/2/2022   3/28/2022     The patient will increase bilateral hip ROM to WNL in order to improve stride length for more normalized gait pattern. 6 5/2/2022   3/28/2022     Pt will perform full day of work without pain. 6 5/2/2022   3/28/2022     Pt will return to gym/sports/normal recreational activities  6 5/2/2022   3/28/2022            Plan     Continue with the plan of care established per initial evaluation    Garrett Rawls, PT

## 2022-05-05 ENCOUNTER — OFFICE VISIT (OUTPATIENT)
Dept: FAMILY MEDICINE | Facility: CLINIC | Age: 82
End: 2022-05-05
Payer: MEDICARE

## 2022-05-05 ENCOUNTER — CLINICAL SUPPORT (OUTPATIENT)
Dept: REHABILITATION | Facility: HOSPITAL | Age: 82
End: 2022-05-05
Payer: MEDICARE

## 2022-05-05 VITALS
HEIGHT: 63 IN | HEART RATE: 100 BPM | WEIGHT: 134.5 LBS | SYSTOLIC BLOOD PRESSURE: 120 MMHG | RESPIRATION RATE: 16 BRPM | BODY MASS INDEX: 23.83 KG/M2 | OXYGEN SATURATION: 96 % | DIASTOLIC BLOOD PRESSURE: 60 MMHG | TEMPERATURE: 99 F

## 2022-05-05 DIAGNOSIS — R29.898 WEAKNESS OF RIGHT LOWER EXTREMITY: Primary | ICD-10-CM

## 2022-05-05 DIAGNOSIS — Z74.09 IMPAIRED FUNCTIONAL MOBILITY AND ACTIVITY TOLERANCE: ICD-10-CM

## 2022-05-05 DIAGNOSIS — T45.1X5A PERIPHERAL NEUROPATHY DUE TO CHEMOTHERAPY: ICD-10-CM

## 2022-05-05 DIAGNOSIS — I10 PRIMARY HYPERTENSION: Primary | ICD-10-CM

## 2022-05-05 DIAGNOSIS — D75.1 POLYCYTHEMIA, SECONDARY: ICD-10-CM

## 2022-05-05 DIAGNOSIS — Z87.19 HISTORY OF BARRETT'S ESOPHAGUS: ICD-10-CM

## 2022-05-05 DIAGNOSIS — E55.9 VITAMIN D DEFICIENCY DISEASE: ICD-10-CM

## 2022-05-05 DIAGNOSIS — C56.1 CARCINOMA OF RIGHT OVARY: ICD-10-CM

## 2022-05-05 DIAGNOSIS — K21.9 GASTROESOPHAGEAL REFLUX DISEASE, UNSPECIFIED WHETHER ESOPHAGITIS PRESENT: ICD-10-CM

## 2022-05-05 DIAGNOSIS — G62.0 PERIPHERAL NEUROPATHY DUE TO CHEMOTHERAPY: ICD-10-CM

## 2022-05-05 DIAGNOSIS — E78.5 HYPERLIPIDEMIA, UNSPECIFIED HYPERLIPIDEMIA TYPE: ICD-10-CM

## 2022-05-05 DIAGNOSIS — S72.001D CLOSED FRACTURE OF RIGHT HIP WITH ROUTINE HEALING, SUBSEQUENT ENCOUNTER: ICD-10-CM

## 2022-05-05 DIAGNOSIS — E11.9 CONTROLLED TYPE 2 DIABETES MELLITUS WITHOUT COMPLICATION, WITHOUT LONG-TERM CURRENT USE OF INSULIN: ICD-10-CM

## 2022-05-05 PROCEDURE — 3288F PR FALLS RISK ASSESSMENT DOCUMENTED: ICD-10-PCS | Mod: CPTII,S$GLB,, | Performed by: FAMILY MEDICINE

## 2022-05-05 PROCEDURE — 3288F FALL RISK ASSESSMENT DOCD: CPT | Mod: CPTII,S$GLB,, | Performed by: FAMILY MEDICINE

## 2022-05-05 PROCEDURE — 3078F DIAST BP <80 MM HG: CPT | Mod: CPTII,S$GLB,, | Performed by: FAMILY MEDICINE

## 2022-05-05 PROCEDURE — 1160F PR REVIEW ALL MEDS BY PRESCRIBER/CLIN PHARMACIST DOCUMENTED: ICD-10-PCS | Mod: CPTII,S$GLB,, | Performed by: FAMILY MEDICINE

## 2022-05-05 PROCEDURE — 97140 MANUAL THERAPY 1/> REGIONS: CPT | Mod: KX,PN,CQ

## 2022-05-05 PROCEDURE — 1157F ADVNC CARE PLAN IN RCRD: CPT | Mod: CPTII,S$GLB,, | Performed by: FAMILY MEDICINE

## 2022-05-05 PROCEDURE — 99999 PR PBB SHADOW E&M-EST. PATIENT-LVL IV: CPT | Mod: PBBFAC,,, | Performed by: FAMILY MEDICINE

## 2022-05-05 PROCEDURE — 99214 PR OFFICE/OUTPT VISIT, EST, LEVL IV, 30-39 MIN: ICD-10-PCS | Mod: S$GLB,,, | Performed by: FAMILY MEDICINE

## 2022-05-05 PROCEDURE — 1160F RVW MEDS BY RX/DR IN RCRD: CPT | Mod: CPTII,S$GLB,, | Performed by: FAMILY MEDICINE

## 2022-05-05 PROCEDURE — 1159F PR MEDICATION LIST DOCUMENTED IN MEDICAL RECORD: ICD-10-PCS | Mod: CPTII,S$GLB,, | Performed by: FAMILY MEDICINE

## 2022-05-05 PROCEDURE — 1101F PR PT FALLS ASSESS DOC 0-1 FALLS W/OUT INJ PAST YR: ICD-10-PCS | Mod: CPTII,S$GLB,, | Performed by: FAMILY MEDICINE

## 2022-05-05 PROCEDURE — 1159F MED LIST DOCD IN RCRD: CPT | Mod: CPTII,S$GLB,, | Performed by: FAMILY MEDICINE

## 2022-05-05 PROCEDURE — 99214 OFFICE O/P EST MOD 30 MIN: CPT | Mod: S$GLB,,, | Performed by: FAMILY MEDICINE

## 2022-05-05 PROCEDURE — 3074F PR MOST RECENT SYSTOLIC BLOOD PRESSURE < 130 MM HG: ICD-10-PCS | Mod: CPTII,S$GLB,, | Performed by: FAMILY MEDICINE

## 2022-05-05 PROCEDURE — 97110 THERAPEUTIC EXERCISES: CPT | Mod: KX,PN,CQ

## 2022-05-05 PROCEDURE — 1126F AMNT PAIN NOTED NONE PRSNT: CPT | Mod: CPTII,S$GLB,, | Performed by: FAMILY MEDICINE

## 2022-05-05 PROCEDURE — 99999 PR PBB SHADOW E&M-EST. PATIENT-LVL IV: ICD-10-PCS | Mod: PBBFAC,,, | Performed by: FAMILY MEDICINE

## 2022-05-05 PROCEDURE — 1126F PR PAIN SEVERITY QUANTIFIED, NO PAIN PRESENT: ICD-10-PCS | Mod: CPTII,S$GLB,, | Performed by: FAMILY MEDICINE

## 2022-05-05 PROCEDURE — 3078F PR MOST RECENT DIASTOLIC BLOOD PRESSURE < 80 MM HG: ICD-10-PCS | Mod: CPTII,S$GLB,, | Performed by: FAMILY MEDICINE

## 2022-05-05 PROCEDURE — 3074F SYST BP LT 130 MM HG: CPT | Mod: CPTII,S$GLB,, | Performed by: FAMILY MEDICINE

## 2022-05-05 PROCEDURE — 1157F PR ADVANCE CARE PLAN OR EQUIV PRESENT IN MEDICAL RECORD: ICD-10-PCS | Mod: CPTII,S$GLB,, | Performed by: FAMILY MEDICINE

## 2022-05-05 PROCEDURE — 1101F PT FALLS ASSESS-DOCD LE1/YR: CPT | Mod: CPTII,S$GLB,, | Performed by: FAMILY MEDICINE

## 2022-05-05 RX ORDER — ERGOCALCIFEROL 1.25 MG/1
50000 CAPSULE ORAL
Qty: 12 CAPSULE | Refills: 1 | Status: SHIPPED | OUTPATIENT
Start: 2022-05-05 | End: 2022-11-14

## 2022-05-05 NOTE — PROGRESS NOTES
Subjective:       Patient ID: Alexa Otero is a 82 y.o. female.    Chief Complaint: Follow-up (6mth f/u)    HPI  Review of Systems   Constitutional: Negative for fatigue and unexpected weight change.   Respiratory: Negative for chest tightness and shortness of breath.    Cardiovascular: Negative for chest pain, palpitations and leg swelling.   Gastrointestinal: Negative for abdominal pain.   Musculoskeletal: Positive for arthralgias.   Neurological: Negative for dizziness, syncope, light-headedness and headaches.       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    Reviewed labs 4/2022  C/o persistent pain  Right hip-can't sleep at night. Continuing to do PT. Taking gabapentin 100mg bid , mobic 15mg daily. Steps, walking and lying on that side the worst    History:     Had hospitalization 12/2021 after fall . She was admitted with Acute intratrochanteric comminuted fracture of the right femur and had surgery INSERTION, INTRAMEDULLARY LUC, FEMUR, TROCHANTER/RIGHT TFN DR FAJARDO NOTIFIED REP (Right)  by Dr. Fajardo  She suffered from postoperaive anemia and received one unit pRBC  Later pt was discharged to rehab facility for rehab  sessions      DEXA 2/2021 normal      Unusual appearing chronic pelvic mass which may relate to residual uterine 0 or ovarian tissue or postoperative seroma which does indent the dome of the bladder posteriorly.  This is partially visualized and of similar size to a 2016 MRI suggesting a benign etiology.   Unusual configuration to the pancreatic head with mild biliary ductal dilatation.  This is also stable in this patient status post cholecystectomy.(had previous bout of pancreatitis)    Decreased hepatic cysts complex cystic lesion and secondary stable hepatic lesion.   Prominent spinal degenerative changes   No evidence of appendicitis or bowel obstruction.   Colonic diverticulosis and some degree of constipation are evident.      Referred to Dr. Ray due to pain in abd and abnl pelvic mass.  U/s showed solid right adnexal mass.  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  Had port acath placed 10/19/20 . Has now completed chemo 2/2021   Last pet 4/2021 IMPRESSION:  1. Unchanged pleural/parenchymal opacities in the right lung as  outlined above, possibly sequelae of infection/inflammation and/or  scarring, and less likely prostatic disease.  2. Prior hysterectomy with no fluid to specifically suggest  residual/recurrent disease in the abdomen or pelvis.     Type 2 DM- a1c 5.8 urine micro neg, lipids at goal. On ACE I and zocor  Eye Dr. Diehl -[place] deg     mammo 3/2022 neg     Vit d normal     Taking ortho for knee pain- Dr. Mojica started on mobic 3/10/20. ? CHERELLE     GI Dr. Stephenson treating GERD, h/o barretts diagnosed 2015 Dr. Jimenez.  treated with HALO and Stretta by Dr. Samuel.  Last egd 2018 benign.   Objective:      Physical Exam  Vitals and nursing note reviewed.   Constitutional:       Appearance: She is well-developed.   Cardiovascular:      Rate and Rhythm: Normal rate and regular rhythm.      Heart sounds: Normal heart sounds.   Pulmonary:      Effort: Pulmonary effort is  normal.      Breath sounds: Normal breath sounds.   Skin:     General: Skin is warm and dry.   Neurological:      Mental Status: She is alert and oriented to person, place, and time.         Assessment:       1. Primary hypertension    2. Hyperlipidemia, unspecified hyperlipidemia type    3. Gastroesophageal reflux disease, unspecified whether esophagitis present    4. Vitamin D deficiency disease    5. Controlled type 2 diabetes mellitus without complication, without long-term current use of insulin    6. Closed fracture of right hip with routine healing, subsequent encounter    7. 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 Peripheral neuropathy due to chemotherapy    9. Polycythemia, secondary    10. History of Kwon's esophagus        Plan:         1. Primary hypertension  Controlled on current medications.  Continue current medications.      2. Hyperlipidemia, unspecified hyperlipidemia type  Controlled on current medications.  Continue current medications.      3. Gastroesophageal reflux disease, unspecified whether esophagitis present  Counseled patient on prevention of reflux with changes in diet and behavior.  I recommended avoidance of greasy and spicy foods, caffeine and eating within 3 hours of bedtime.  I counseled the patient to avoid eating large meals and instead eating more frequent small meals.  I also recommended weight loss and elevation of the head of the bed by 6 inches.  If symptoms persist after these changes medication may be needed to control GERD.        4. Vitamin D deficiency disease  Screen and treat as indicated:    - Vitamin D; Future  - ergocalciferol (ERGOCALCIFEROL) 50,000 unit Cap; Take 1 capsule (50,000 Units total) by mouth every 7 days.  Dispense: 12 capsule; Refill: 1    5. Controlled type 2 diabetes mellitus without complication, without long-term current use of insulin  Controlled on current medications.  Continue current medications.    - Comprehensive  Metabolic Panel; Future  - Hemoglobin A1C; Future    6. Closed fracture of right hip with routine healing, subsequent encounter  F/u ortho     7. 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 Peripheral neuropathy due to chemotherapy  Cont current mgmt    9. Polycythemia, secondary  Cont surveillance and treatment    10. History of Kwon's esophagus  Cont current mgmt and GI surveillance        Time spent with patient: 20 minutes    Patient with be reevaluated in 6 months or sooner prn    Greater than 50% of this visit was spent counseling as described in above documentation:Yes

## 2022-05-05 NOTE — PROGRESS NOTES
"[facility]  Outpatient Physical Therapy Daily Treatment Note      Name: Alexa Otero  Clinic Number: [id number]  Visit Date: 5/5/2022    Therapy Diagnosis:   Encounter Diagnoses   Name Primary?    Weakness of right lower extremity Yes    Impaired functional mobility and activity tolerance        Physician: Kp Gutierres,*  Physician Orders: PT Eval and Treat  Medical Diagnosis from Referral:   M25.551 (ICD-10-CM) - Right hip pain   S72.141D (ICD-10-CM) - Closed fracture of femur, intertrochanteric, right, with routine healing, subsequent encounter      Evaluation Date: 3/28/2022  Authorization Period Start - Expiration: 03/25/22 - 12/31/22  Plan of Care Certification Period: 3/28/22 - 6/28/22  Progress Note Due: 4/28  Visit # / Visits authorized: 10/ 20  (10/12 POC)  FOTO: Issued Visit #: 1, 5     PTA Visit #: 1/5     FOTO Due Visit 5 -  Follow up  FOTO Due Visit 10 - Follow up    Time In: 1200  Time Out: 1245  Total Billable Timed: 45 minutes  Total Billable Un-timed: 0 minutes    Precautions: Standard    Subjective     The patient reports: finally able to sleep last night in the recliner. Pain continues to go up and down, but feels like therapy is helping. She goes to the doctor next week for a follow up.   Response to previous treatment: felt good after session, but hurting that night   Functional change: pain when sitting for too long or laying on the right side     Pain: 1/10  Location: right low back into right hip and groin     Objective       Alexa received therapeutic exercises to develop strength, endurance, ROM, flexibility and posture for 37 minutes including:      LTR x 30  POSTERIOR PELVIC TILT x 30  Hip ADD ball x 30  PPT with Hip ABD Green TheraBand x 30  DKTC with PB x 30  Bridges small range x 20   Hip flexor stretch off mat 2 x 30"   Piriformis stretch push away 2 x 30" B       Manual x 8 min   Left side lying with roller to right hip musculature by therapist "       Not performed:   Recumbent bike x 6 min   Shuttle 50 # x 20,    Shuttle R leg 12# x 20  Shuttle donkey kicks R Lower Extremity no Wt x 20  Standing hip abduction B 2 x 10   Standing calf raises x 20  Step ups forward at staircase x 20   S/L balance 5x's as long as possible  CC walk back 10# x 5      Alexa participated in neuromuscular re-education activities to improve: Balance, Coordination, Sense, Proprioception and Posture for  minutes. The following activities were included:        Patient Education and HEP     She was compliant with home exercise program.    Education provided:   - Continue with Home Exercise Program, walk as much as possible, ice to right knee and elevate for swelling     Written Home Exercises Provided: Patient instructed to cont prior HEP.  Exercises were reviewed and Alexa was able to demonstrate them prior to the end of the session.  Alexa demonstrated fair  understanding of the education provided.     See EMR under Patient Instructions for exercises provided prior visit.    Assessment     Session focused on strength and flexibility without increasing right hip pain/discomfort. Favorable response to manual performed to help minimize inflammation of the right hip. Alexa still has decreased hip strength and mobility as well as pain with laying on her hip and sitting for long periods of time that can radiate into her right groin. Continue to progress as tolerated.       Pt will continue to benefit from skilled outpatient physical therapy to address the deficits listed in the problem list box on initial evaluation, provide pt/family education and to maximize pt's level of independence in the home and community environment.     Alexa Is progressing well towards her goals.   Pt prognosis is Good.     Pt's spiritual, cultural and educational needs considered and pt agreeable to plan of care and goals.    Anticipated barriers to physical therapy: none    Goals:   Short Term Goals (STG) #  weeks Goal Review Date Reviewed Date Met   Pt will demonstrate independence with initial HEP to facilitate therex progression and improvements in functional mobility 1 5/5/2022   3/28/2022     The patient will increase bilateral lower extremity strength to greater than or equal to 1/3 manual muscle grade for all deficient areas in order to facilitate proper lifting mechanics 3 5/5/2022   3/28/2022     Patient will reduce pain level to 2/10 2 5/5/2022   3/28/2022     Patient will be able to ascend/descend stairs with no increased pain. 2 5/5/2022   3/28/2022        Long Term Goals (LTG) # weeks Goal Review Date Reviewed Date Met   The patient will improve the Lower Extremity Functional Scale to less than 31% Disability 6 5/5/2022    3/28/2022     The patient will increase bilateral lower extremity strength to greater than or equal to 1 manual muscle grade for all deficient areas in order to get in and out of vehicle with no limitations 6 5/5/2022   3/28/2022     The patient will increase bilateral hip ROM to WNL in order to improve stride length for more normalized gait pattern. 6 5/5/2022   3/28/2022     Pt will perform full day of work without pain. 6 5/5/2022   3/28/2022     Pt will return to gym/sports/normal recreational activities  6 5/5/2022   3/28/2022            Plan     Continue with the plan of care established per initial evaluation.     Susy Euceda, PTA

## 2022-05-09 ENCOUNTER — CLINICAL SUPPORT (OUTPATIENT)
Dept: REHABILITATION | Facility: HOSPITAL | Age: 82
End: 2022-05-09
Payer: MEDICARE

## 2022-05-09 DIAGNOSIS — Z74.09 IMPAIRED FUNCTIONAL MOBILITY AND ACTIVITY TOLERANCE: ICD-10-CM

## 2022-05-09 DIAGNOSIS — R29.898 WEAKNESS OF RIGHT LOWER EXTREMITY: Primary | ICD-10-CM

## 2022-05-09 PROCEDURE — 97140 MANUAL THERAPY 1/> REGIONS: CPT | Mod: PN

## 2022-05-09 PROCEDURE — 97110 THERAPEUTIC EXERCISES: CPT | Mod: PN

## 2022-05-09 NOTE — PROGRESS NOTES
"[facility]  Outpatient Physical Therapy Daily Treatment Note      Name: Alexa Otero  Clinic Number: [id number]  Visit Date: 5/9/2022    Therapy Diagnosis:   Encounter Diagnoses   Name Primary?    Weakness of right lower extremity Yes    Impaired functional mobility and activity tolerance        Physician: Kp Gutierres,*  Physician Orders: PT Eval and Treat  Medical Diagnosis from Referral:   M25.551 (ICD-10-CM) - Right hip pain   S72.141D (ICD-10-CM) - Closed fracture of femur, intertrochanteric, right, with routine healing, subsequent encounter      Evaluation Date: 3/28/2022  Authorization Period Start - Expiration: 03/25/22 - 12/31/22  Plan of Care Certification Period: 3/28/22 - 6/28/22  Progress Note Due: 4/28  Visit # / Visits authorized: 11/ 12  (11/12 POC)  FOTO: Issued Visit #: 1, 5     PTA Visit #: 0/5     FOTO Due Visit 5 -  Follow up  FOTO Due Visit 10 - Follow up    Time In: 215  Time Out: 300  Total Billable Timed: 45 minutes  Total Billable Un-timed: 0 minutes    Precautions: Standard    Subjective     The patient reports: slept better last night, sore from walking now  Response to previous treatment: felt good after session,   Functional change: able to sleep on her side    Pain: 1/10  Location: right low back into right hip and groin     Objective       Alexa received therapeutic exercises to develop strength, endurance, ROM, flexibility and posture for 35 minutes including:      LTR x 30  POSTERIOR PELVIC TILT x 30  Hip ADD ball x 30  PPT with Hip ABD Green TheraBand x 30  DKTC with PB x 30  Bridges small range x 20   S/L clam no band x 20  Hip flexor stretch off mat 2 x 30"   Piriformis stretch push away 2 x 30" B       Manual x 10 min   Left side lying with roller to right hip musculature by therapist       Not performed:   Recumbent bike x 6 min   Shuttle 50 # x 20,    Shuttle R leg 12# x 20  Shuttle donkey kicks R Lower Extremity no Wt x 20  Standing hip " abduction B 2 x 10   Standing calf raises x 20  Step ups forward at staircase x 20   S/L balance 5x's as long as possible  CC walk back 10# x 5      Alexa participated in neuromuscular re-education activities to improve: Balance, Coordination, Sense, Proprioception and Posture for  minutes. The following activities were included:        Patient Education and HEP     She was compliant with home exercise program.    Education provided:   - Continue with Home Exercise Program, walk as much as possible, ice to right knee and elevate for swelling     Written Home Exercises Provided: Patient instructed to cont prior HEP.  Exercises were reviewed and Alexa was able to demonstrate them prior to the end of the session.  Alexa demonstrated fair  understanding of the education provided.     See EMR under Patient Instructions for exercises provided prior visit.    Assessment     Session focused on strength and flexibility without increasing right hip pain/discomfort. Favorable response to manual performed to help minimize inflammation of the right hip. Alexa still has decreased hip strength and mobility especially hip extension and IR . Continue to progress as tolerated.       Pt will continue to benefit from skilled outpatient physical therapy to address the deficits listed in the problem list box on initial evaluation, provide pt/family education and to maximize pt's level of independence in the home and community environment.     Alexa Is progressing well towards her goals.   Pt prognosis is Good.     Pt's spiritual, cultural and educational needs considered and pt agreeable to plan of care and goals.    Anticipated barriers to physical therapy: none    Goals:   Short Term Goals (STG) # weeks Goal Review Date Reviewed Date Met   Pt will demonstrate independence with initial HEP to facilitate therex progression and improvements in functional mobility 1 5/9/2022   3/28/2022     The patient will increase bilateral lower  extremity strength to greater than or equal to 1/3 manual muscle grade for all deficient areas in order to facilitate proper lifting mechanics 3 5/9/2022   3/28/2022     Patient will reduce pain level to 2/10 2 5/9/2022   3/28/2022     Patient will be able to ascend/descend stairs with no increased pain. 2 5/9/2022   3/28/2022        Long Term Goals (LTG) # weeks Goal Review Date Reviewed Date Met   The patient will improve the Lower Extremity Functional Scale to less than 31% Disability 6 5/9/2022    3/28/2022     The patient will increase bilateral lower extremity strength to greater than or equal to 1 manual muscle grade for all deficient areas in order to get in and out of vehicle with no limitations 6 5/9/2022   3/28/2022     The patient will increase bilateral hip ROM to WNL in order to improve stride length for more normalized gait pattern. 6 5/9/2022   3/28/2022     Pt will perform full day of work without pain. 6 5/9/2022   3/28/2022     Pt will return to gym/sports/normal recreational activities  6 5/9/2022   3/28/2022            Plan     Continue with the plan of care established per initial evaluation.     Garrett Rawls, PT

## 2022-05-12 ENCOUNTER — CLINICAL SUPPORT (OUTPATIENT)
Dept: REHABILITATION | Facility: HOSPITAL | Age: 82
End: 2022-05-12
Payer: MEDICARE

## 2022-05-12 DIAGNOSIS — R29.898 WEAKNESS OF RIGHT LOWER EXTREMITY: Primary | ICD-10-CM

## 2022-05-12 DIAGNOSIS — Z74.09 IMPAIRED FUNCTIONAL MOBILITY AND ACTIVITY TOLERANCE: ICD-10-CM

## 2022-05-12 PROCEDURE — 97140 MANUAL THERAPY 1/> REGIONS: CPT | Mod: PN

## 2022-05-12 PROCEDURE — 97110 THERAPEUTIC EXERCISES: CPT | Mod: PN

## 2022-05-12 NOTE — PROGRESS NOTES
"[facility]  Outpatient Physical Therapy Daily Treatment Note      Name: Alexa Otero  Clinic Number: [id number]  Visit Date: 5/12/2022    Therapy Diagnosis:   Encounter Diagnoses   Name Primary?    Weakness of right lower extremity Yes    Impaired functional mobility and activity tolerance        Physician: Kp Gutierres,*  Physician Orders: PT Eval and Treat  Medical Diagnosis from Referral:   M25.551 (ICD-10-CM) - Right hip pain   S72.141D (ICD-10-CM) - Closed fracture of femur, intertrochanteric, right, with routine healing, subsequent encounter      Evaluation Date: 3/28/2022  Authorization Period Start - Expiration: 03/25/22 - 12/31/22  Plan of Care Certification Period: 3/28/22 - 6/28/22  Progress Note Due: 4/28  Visit # / Visits authorized: 12/ 12  (12/12 POC)  FOTO: Issued Visit #: 1, 5     PTA Visit #: 0/5     FOTO Due Visit 5 -  Follow up  FOTO Due Visit 10 - Follow up    Time In: 230  Time Out: 315  Total Billable Timed: 45 minutes  Total Billable Un-timed: 0 minutes    Precautions: Standard    Subjective     The patient reports: she was not able to sleep last nigh, hip pain, better today  Response to previous treatment: felt good after session,   Functional change: able to sleep on her side    Pain: 3/10  Location: right low back into right hip and groin     Objective       Alexa received therapeutic exercises to develop strength, endurance, ROM, flexibility and posture for 35 minutes including:      LTR x 30  POSTERIOR PELVIC TILT x 30  Hip ADD ball x 30  PPT with Hip ABD Green TheraBand x 30  DKTC with PB x 30  Bridges small range x 20   S/L clam no band x 20  Hip flexor stretch off mat 2 x 30"   Piriformis stretch push away 2 x 30" B       Manual x 10 min   Left side lying with roller to right hip musculature by therapist         Recumbent bike x 6 min   Shuttle 37 # x 20,    Shuttle R leg 12# x 20  Shuttle donkey kicks R Lower Extremity no Wt x 20  Standing hip " abduction B 2 x 10   Standing calf raises x 20  Step ups forward at staircase x 20   S/L balance 5x's as long as possible  CC walk back 10# x 5      Alexa participated in neuromuscular re-education activities to improve: Balance, Coordination, Sense, Proprioception and Posture for  minutes. The following activities were included:        Patient Education and HEP     She was compliant with home exercise program.    Education provided:   - Continue with Home Exercise Program, walk as much as possible, ice to right knee and elevate for swelling     Written Home Exercises Provided: Patient instructed to cont prior HEP.  Exercises were reviewed and Alexa was able to demonstrate them prior to the end of the session.  Alexa demonstrated fair  understanding of the education provided.     See EMR under Patient Instructions for exercises provided prior visit.    Assessment     Session focused on strength and flexibility without increasing right hip pain/discomfort. Favorable response to manual performed to help minimize inflammation of the right hip. Alexa still has decreased hip strength and mobility especially hip extension and IR . Continue to progress as tolerated. Has follow up with surgeon soon, will try to get more Therapy as she still has hip weakness      Pt will continue to benefit from skilled outpatient physical therapy to address the deficits listed in the problem list box on initial evaluation, provide pt/family education and to maximize pt's level of independence in the home and community environment.     Alexa Is progressing well towards her goals.   Pt prognosis is Good.     Pt's spiritual, cultural and educational needs considered and pt agreeable to plan of care and goals.    Anticipated barriers to physical therapy: none    Goals:   Short Term Goals (STG) # weeks Goal Review Date Reviewed Date Met   Pt will demonstrate independence with initial HEP to facilitate therex progression and improvements in  functional mobility 1 5/12/2022   3/28/2022     The patient will increase bilateral lower extremity strength to greater than or equal to 1/3 manual muscle grade for all deficient areas in order to facilitate proper lifting mechanics 3 5/12/2022   3/28/2022     Patient will reduce pain level to 2/10 2 5/12/2022   3/28/2022     Patient will be able to ascend/descend stairs with no increased pain. 2 5/12/2022   3/28/2022        Long Term Goals (LTG) # weeks Goal Review Date Reviewed Date Met   The patient will improve the Lower Extremity Functional Scale to less than 31% Disability 6 5/12/2022    3/28/2022     The patient will increase bilateral lower extremity strength to greater than or equal to 1 manual muscle grade for all deficient areas in order to get in and out of vehicle with no limitations 6 5/12/2022   3/28/2022     The patient will increase bilateral hip ROM to WNL in order to improve stride length for more normalized gait pattern. 6 5/12/2022   3/28/2022     Pt will perform full day of work without pain. 6 5/12/2022   3/28/2022     Pt will return to gym/sports/normal recreational activities  6 5/12/2022   3/28/2022            Plan     Continue with the plan of care established per initial evaluation.     Garrett Rawls, PT

## 2022-05-16 ENCOUNTER — PATIENT MESSAGE (OUTPATIENT)
Dept: ORTHOPEDICS | Facility: CLINIC | Age: 82
End: 2022-05-16

## 2022-05-16 ENCOUNTER — OFFICE VISIT (OUTPATIENT)
Dept: ORTHOPEDICS | Facility: CLINIC | Age: 82
End: 2022-05-16
Payer: MEDICARE

## 2022-05-16 VITALS — WEIGHT: 134 LBS | RESPIRATION RATE: 18 BRPM | BODY MASS INDEX: 23.74 KG/M2 | HEIGHT: 63 IN

## 2022-05-16 DIAGNOSIS — S72.141D CLOSED FRACTURE OF FEMUR, INTERTROCHANTERIC, RIGHT, WITH ROUTINE HEALING, SUBSEQUENT ENCOUNTER: ICD-10-CM

## 2022-05-16 DIAGNOSIS — M25.551 RIGHT HIP PAIN: Primary | ICD-10-CM

## 2022-05-16 PROCEDURE — 99999 PR PBB SHADOW E&M-EST. PATIENT-LVL III: ICD-10-PCS | Mod: PBBFAC,,, | Performed by: ORTHOPAEDIC SURGERY

## 2022-05-16 PROCEDURE — 1157F PR ADVANCE CARE PLAN OR EQUIV PRESENT IN MEDICAL RECORD: ICD-10-PCS | Mod: CPTII,S$GLB,, | Performed by: ORTHOPAEDIC SURGERY

## 2022-05-16 PROCEDURE — 1101F PR PT FALLS ASSESS DOC 0-1 FALLS W/OUT INJ PAST YR: ICD-10-PCS | Mod: CPTII,S$GLB,, | Performed by: ORTHOPAEDIC SURGERY

## 2022-05-16 PROCEDURE — 1101F PT FALLS ASSESS-DOCD LE1/YR: CPT | Mod: CPTII,S$GLB,, | Performed by: ORTHOPAEDIC SURGERY

## 2022-05-16 PROCEDURE — 99213 OFFICE O/P EST LOW 20 MIN: CPT | Mod: S$GLB,,, | Performed by: ORTHOPAEDIC SURGERY

## 2022-05-16 PROCEDURE — 99213 PR OFFICE/OUTPT VISIT, EST, LEVL III, 20-29 MIN: ICD-10-PCS | Mod: S$GLB,,, | Performed by: ORTHOPAEDIC SURGERY

## 2022-05-16 PROCEDURE — 99999 PR PBB SHADOW E&M-EST. PATIENT-LVL III: CPT | Mod: PBBFAC,,, | Performed by: ORTHOPAEDIC SURGERY

## 2022-05-16 PROCEDURE — 3288F PR FALLS RISK ASSESSMENT DOCUMENTED: ICD-10-PCS | Mod: CPTII,S$GLB,, | Performed by: ORTHOPAEDIC SURGERY

## 2022-05-16 PROCEDURE — 1159F PR MEDICATION LIST DOCUMENTED IN MEDICAL RECORD: ICD-10-PCS | Mod: CPTII,S$GLB,, | Performed by: ORTHOPAEDIC SURGERY

## 2022-05-16 PROCEDURE — 1159F MED LIST DOCD IN RCRD: CPT | Mod: CPTII,S$GLB,, | Performed by: ORTHOPAEDIC SURGERY

## 2022-05-16 PROCEDURE — 3288F FALL RISK ASSESSMENT DOCD: CPT | Mod: CPTII,S$GLB,, | Performed by: ORTHOPAEDIC SURGERY

## 2022-05-16 PROCEDURE — 1157F ADVNC CARE PLAN IN RCRD: CPT | Mod: CPTII,S$GLB,, | Performed by: ORTHOPAEDIC SURGERY

## 2022-05-16 NOTE — PROGRESS NOTES
Past Medical History:   Diagnosis Date    Arthritis     Cataract     Diabetes mellitus type II 2012    Encounter for blood transfusion     Extra-ovarian endometrioid adenocarcinoma 2020    GERD (gastroesophageal reflux disease)     Hyperlipidemia     Hypertension     Macular degeneration     PONV (postoperative nausea and vomiting)     Squamous cell carcinoma 1980's    precancer of cervix       Past Surgical History:   Procedure Laterality Date    AUGMENTATION OF BREAST      CHOLECYSTECTOMY      ESOPHAGOGASTRODUODENOSCOPY N/A 6/20/2018    Procedure: EGD (ESOPHAGOGASTRODUODENOSCOPY);  Surgeon: Sabino Stephenson MD;  Location: [facility];  Service: Endoscopy;  Laterality: N/A;    HYSTERECTOMY      partial    INSERTION OF TUNNELED CENTRAL VENOUS CATHETER (CVC) WITH SUBCUTANEOUS PORT Right 10/19/2020    Procedure: INSERTION, PORT-A-CATH;  Surgeon: Misti Mcfarland MD;  Location: [facility];  Service: General;  Laterality: Right;    INTRAMEDULLARY RODDING OF TROCHANTER OF FEMUR Right 11/28/2021    Procedure: INSERTION, INTRAMEDULLARY LUC, FEMUR, TROCHANTER/RIGHT TFN DR FAJARDO NOTIFIED REP;  Surgeon: Kp Fajardo MD;  Location: [facility];  Service: Orthopedics;  Laterality: Right;  SKIP    ROBOT-ASSISTED LAPAROSCOPIC LYMPHADENECTOMY USING DA NELLA XI N/A 9/21/2020    Procedure: XI ROBOTIC LYMPHADENECTOMY-pelvic and kell-aortic;  Surgeon: Altagracia Ray MD;  Location: [facility];  Service: OB/GYN;  Laterality: N/A;    ROBOT-ASSISTED LAPAROSCOPIC OMENTECTOMY USING DA NELLA XI N/A 9/21/2020    Procedure: XI ROBOTIC OMENTECTOMY;  Surgeon: Altagracia Ray MD;  Location: [facility];  Service: OB/GYN;  Laterality: N/A;    ROBOT-ASSISTED LAPAROSCOPIC SALPINGO-OOPHORECTOMY USING DA NELLA XI Bilateral 9/21/2020    Procedure: XI ROBOTIC SALPINGO-OOPHORECTOMY;  Surgeon: Altagracia Ray MD;  Location: [facility] OR;  Service: OB/GYN;  Laterality: Bilateral;       Current Outpatient Medications   Medication  Sig    benazepriL (LOTENSIN) 40 MG tablet TAKE 1 TABLET(40 MG) BY MOUTH EVERY DAY    ergocalciferol (ERGOCALCIFEROL) 50,000 unit Cap Take 1 capsule (50,000 Units total) by mouth every 7 days.    gabapentin (NEURONTIN) 100 MG capsule Take 1 capsule (100 mg total) by mouth 2 (two) times daily.    hydroCHLOROthiazide (HYDRODIURIL) 25 MG tablet TAKE 1 TABLET(25 MG) BY MOUTH EVERY DAY    meloxicam (MOBIC) 15 MG tablet Take 1 tablet (15 mg total) by mouth once daily.    metFORMIN (GLUCOPHAGE) 500 MG tablet TAKE 1 TABLET(500 MG) BY MOUTH TWICE DAILY WITH MEALS    omeprazole (PRILOSEC) 40 MG capsule TAKE 1 CAPSULE(40 MG) BY MOUTH EVERY DAY    simvastatin (ZOCOR) 40 MG tablet TAKE 1 TABLET(40 MG) BY MOUTH EVERY EVENING    tiZANidine (ZANAFLEX) 2 MG tablet TAKE 2 TABLETS(4 MG) BY MOUTH EVERY 6 HOURS AS NEEDED FOR SPASMS    VIT A/VIT C/VIT E/ZINC/COPPER (ICAPS AREDS ORAL) Take 1 capsule by mouth 2 (two) times a day.      No current facility-administered medications for this visit.       Review of patient's allergies indicates:  No Known Allergies    Family History   Problem Relation Age of Onset    Cancer Mother 57        lung    Cancer Father 56        oral    Skin cancer Sister     Breast cancer Maternal Grandmother     Breast cancer Paternal Grandmother     Skin cancer Brother     Melanoma Brother     Skin cancer Brother     Psoriasis Neg Hx     Lupus Neg Hx     Eczema Neg Hx        Social History     Socioeconomic History    Marital status:    Tobacco Use    Smoking status: Former Smoker     Packs/day: 1.00     Years: 20.00     Pack years: 20.00     Types: Cigarettes    Smokeless tobacco: Never Used    Tobacco comment: quit 40 yrs ago   Substance and Sexual Activity    Alcohol use: Yes     Alcohol/week: 2.0 standard drinks     Types: 2 Glasses of wine per week     Comment: occasional    Drug use: No    Sexual activity: Not Currently     Partners: Male     Social Determinants of Health      Financial Resource Strain: Medium Risk    Difficulty of Paying Living Expenses: Somewhat hard   Food Insecurity: No Food Insecurity    Worried About Running Out of Food in the Last Year: Never true    Ran Out of Food in the Last Year: Never true   Transportation Needs: No Transportation Needs    Lack of Transportation (Medical): No    Lack of Transportation (Non-Medical): No   Physical Activity: Insufficiently Active    Days of Exercise per Week: 2 days    Minutes of Exercise per Session: 40 min   Stress: No Stress Concern Present    Feeling of Stress : Only a little   Social Connections: Unknown    Frequency of Communication with Friends and Family: More than three times a week    Frequency of Social Gatherings with Friends and Family: Three times a week    Active Member of Clubs or Organizations: No    Attends Club or Organization Meetings: Patient refused    Marital Status:    Housing Stability: Low Risk     Unable to Pay for Housing in the Last Year: No    Number of Places Lived in the Last Year: 1    Unstable Housing in the Last Year: No       Chief Complaint:   Chief Complaint   Patient presents with    Post-op Evaluation     IM nailing for right intertrochanteric femur fracture, dos 11/28/21       Date of surgery:  November 28, 2021    History of present illness:  82-year-old female underwent IM nailing for right intertrochanteric femur fracture.  She is doing pretty well.  Had an episode a couple of months before where she had a sharp pain kind of in her buttocks and traveled down the back of her leg to her knee.  Making good progress with physical therapy on strength.  Still having lot of pain in the groin and at night.    Review of Systems:    Musculoskeletal:  See HPI        Physical Examination:    Vital Signs:    Vitals:    05/16/22 0940   Resp: 18       Body mass index is 23.74 kg/m².    This a well-developed, well nourished patient in no acute distress.  They are alert and  oriented and cooperative to examination.  Pt. walks without an antalgic gait.    Examination of the right hip shows healed surgical incisions.  No erythema drainage.  Minimal pain with range of motion of the hip.  Is a little tight on the right hip with range of motion    X-rays:  X-rays of the right hip are  review which show a tati and screw in position.  There is some displacement of a greater trochanteric tip fracture.  Interval healing is noted.  X-rays of the right knee are  reviewed which show some moderate arthritis in both knees with medial joint space narrowing.     Assessment::  Status post IM nailing right intertrochanteric hip fracture  Right hip weakness and possible tendon strain    Plan:  Reviewed the x-rays with her today.  Fracture appears to be healing.  Continue with the physical therapy to work on strengthening in that right leg as well as some flexibility in the muscles around her hip.  We will get the radiologist to do an intra-articular hip injection as well.  Follow-up in 2 months.      This note was created using M [employer] voice recognition software that occasionally misinterpreted phrases or words.

## 2022-05-17 ENCOUNTER — CLINICAL SUPPORT (OUTPATIENT)
Dept: REHABILITATION | Facility: HOSPITAL | Age: 82
End: 2022-05-17
Attending: ORTHOPAEDIC SURGERY
Payer: MEDICARE

## 2022-05-17 DIAGNOSIS — R29.898 WEAKNESS OF RIGHT LOWER EXTREMITY: Primary | ICD-10-CM

## 2022-05-17 DIAGNOSIS — S72.141D CLOSED FRACTURE OF FEMUR, INTERTROCHANTERIC, RIGHT, WITH ROUTINE HEALING, SUBSEQUENT ENCOUNTER: ICD-10-CM

## 2022-05-17 DIAGNOSIS — M25.551 RIGHT HIP PAIN: ICD-10-CM

## 2022-05-17 DIAGNOSIS — Z74.09 IMPAIRED FUNCTIONAL MOBILITY AND ACTIVITY TOLERANCE: ICD-10-CM

## 2022-05-17 PROCEDURE — 97110 THERAPEUTIC EXERCISES: CPT | Mod: PN

## 2022-05-17 NOTE — PROGRESS NOTES
"[facility]  Outpatient Physical Therapy Daily Treatment Note      Name: Alexa Otero  Clinic Number: [id number]  Visit Date: 5/17/2022    Therapy Diagnosis:   Encounter Diagnoses   Name Primary?    Right hip pain     Closed fracture of femur, intertrochanteric, right, with routine healing, subsequent encounter     Weakness of right lower extremity Yes    Impaired functional mobility and activity tolerance        Physician: Kp Gutierres,*  Physician Orders: PT Eval and Treat  Medical Diagnosis from Referral:   M25.551 (ICD-10-CM) - Right hip pain   S72.141D (ICD-10-CM) - Closed fracture of femur, intertrochanteric, right, with routine healing, subsequent encounter      Evaluation Date: 3/28/2022  Authorization Period Start - Expiration: 03/25/22 - 12/31/22  Plan of Care Certification Period: 3/28/22 - 6/28/22  Progress Note Due: 4/28  Visit # / Visits authorized: 12/ 12  (12/12 POC)  FOTO: Issued Visit #: 1, 5     PTA Visit #: 0/5     FOTO Due Visit 5 -  Follow up  FOTO Due Visit 10 - Follow up    Time In: 1515  Time Out: 1555  Total Billable Timed: 40 minutes  Total Billable Un-timed: 0 minutes    Precautions: Standard    Subjective     The patient reports: she was not able to sleep last nigh, hip pain, better today  Response to previous treatment: felt good after session,   Functional change: able to sleep on her side    Pain: 2/10  Location: right low back into right hip and groin     Objective       Alexa received therapeutic exercises to develop strength, endurance, ROM, flexibility and posture for 40 minutes including:      LTR x 30  POSTERIOR PELVIC TILT x 30  Hip ADD ball x 30  PPT with Hip ABD Green TheraBand x 30  DKTC with PB x 30  Bridges small range x 30   S/L clam RTB x 30  Hip flexor stretch off mat 2 x 30"   Piriformis stretch push away 2 x 30" B       Manual x 10 min NP  Left side lying with roller to right hip musculature by therapist         Recumbent bike " x 6 min   Shuttle 25# 5'    Shuttle R leg 12# x 20 NP  Shuttle donkey kicks R Lower Extremity no Wt x 20 NP  Standing hip abduction B 2 x 10   Standing calf raises x 30  Gastroc stretch 3x30  Step ups forward at staircase x 20   S/L balance 5x's as long as possible NP  CC walk back 10# x 5      Alexa participated in neuromuscular re-education activities to improve: Balance, Coordination, Sense, Proprioception and Posture for  minutes. The following activities were included:        Patient Education and HEP     She was compliant with home exercise program.    Education provided:   - Continue with Home Exercise Program, walk as much as possible, ice to right knee and elevate for swelling     Written Home Exercises Provided: Patient instructed to cont prior HEP.  Exercises were reviewed and Alexa was able to demonstrate them prior to the end of the session.  Alexa demonstrated fair  understanding of the education provided.     See EMR under Patient Instructions for exercises provided prior visit.    Assessment     Session focused on strength and flexibility without increasing right hip pain/discomfort. Favorable response to manual performed to help minimize inflammation of the right hip. Alexa still has decreased hip strength and mobility especially hip extension and IR . Continue to progress as tolerated. Has follow up with surgeon soon, will try to get more Therapy as she still has hip weakness      Pt will continue to benefit from skilled outpatient physical therapy to address the deficits listed in the problem list box on initial evaluation, provide pt/family education and to maximize pt's level of independence in the home and community environment.     Alexa Is progressing well towards her goals.   Pt prognosis is Good.     Pt's spiritual, cultural and educational needs considered and pt agreeable to plan of care and goals.    Anticipated barriers to physical therapy: none    Goals:   Short Term Goals (STG) #  weeks Goal Review Date Reviewed Date Met   Pt will demonstrate independence with initial HEP to facilitate therex progression and improvements in functional mobility 1 5/17/2022   3/28/2022     The patient will increase bilateral lower extremity strength to greater than or equal to 1/3 manual muscle grade for all deficient areas in order to facilitate proper lifting mechanics 3 5/17/2022   3/28/2022     Patient will reduce pain level to 2/10 2 5/17/2022   3/28/2022     Patient will be able to ascend/descend stairs with no increased pain. 2 5/17/2022   3/28/2022        Long Term Goals (LTG) # weeks Goal Review Date Reviewed Date Met   The patient will improve the Lower Extremity Functional Scale to less than 31% Disability 6 5/17/2022    3/28/2022     The patient will increase bilateral lower extremity strength to greater than or equal to 1 manual muscle grade for all deficient areas in order to get in and out of vehicle with no limitations 6 5/17/2022   3/28/2022     The patient will increase bilateral hip ROM to WNL in order to improve stride length for more normalized gait pattern. 6 5/17/2022   3/28/2022     Pt will perform full day of work without pain. 6 5/17/2022   3/28/2022     Pt will return to gym/sports/normal recreational activities  6 5/17/2022   3/28/2022            Plan     Continue with the plan of care established per initial evaluation.     Enrrique Johnson, PT

## 2022-05-19 LAB
LEFT EYE DM RETINOPATHY: POSITIVE
RIGHT EYE DM RETINOPATHY: POSITIVE

## 2022-05-20 ENCOUNTER — PATIENT OUTREACH (OUTPATIENT)
Dept: ADMINISTRATIVE | Facility: HOSPITAL | Age: 82
End: 2022-05-20
Payer: MEDICARE

## 2022-05-23 ENCOUNTER — INFUSION (OUTPATIENT)
Dept: INFUSION THERAPY | Facility: HOSPITAL | Age: 82
End: 2022-05-23
Attending: OBSTETRICS & GYNECOLOGY
Payer: MEDICARE

## 2022-05-23 VITALS
BODY MASS INDEX: 24.03 KG/M2 | RESPIRATION RATE: 18 BRPM | TEMPERATURE: 98 F | HEIGHT: 63 IN | DIASTOLIC BLOOD PRESSURE: 73 MMHG | OXYGEN SATURATION: 96 % | HEART RATE: 100 BPM | SYSTOLIC BLOOD PRESSURE: 169 MMHG | WEIGHT: 135.63 LBS

## 2022-05-23 DIAGNOSIS — C56.1 CARCINOMA OF RIGHT OVARY: ICD-10-CM

## 2022-05-23 DIAGNOSIS — Z51.11 MAINTENANCE CHEMOTHERAPY: Primary | ICD-10-CM

## 2022-05-23 PROCEDURE — A4216 STERILE WATER/SALINE, 10 ML: HCPCS | Performed by: OBSTETRICS & GYNECOLOGY

## 2022-05-23 PROCEDURE — 63600175 PHARM REV CODE 636 W HCPCS: Performed by: OBSTETRICS & GYNECOLOGY

## 2022-05-23 PROCEDURE — 36591 DRAW BLOOD OFF VENOUS DEVICE: CPT

## 2022-05-23 PROCEDURE — 25000003 PHARM REV CODE 250: Performed by: OBSTETRICS & GYNECOLOGY

## 2022-05-23 RX ORDER — SODIUM CHLORIDE 0.9 % (FLUSH) 0.9 %
10 SYRINGE (ML) INJECTION
Status: CANCELLED | OUTPATIENT
Start: 2022-05-23

## 2022-05-23 RX ORDER — SODIUM CHLORIDE 0.9 % (FLUSH) 0.9 %
10 SYRINGE (ML) INJECTION
Status: DISCONTINUED | OUTPATIENT
Start: 2022-05-23 | End: 2022-05-23 | Stop reason: HOSPADM

## 2022-05-23 RX ORDER — HEPARIN 100 UNIT/ML
500 SYRINGE INTRAVENOUS
Status: CANCELLED | OUTPATIENT
Start: 2022-05-23

## 2022-05-23 RX ORDER — HEPARIN 100 UNIT/ML
500 SYRINGE INTRAVENOUS
Status: DISCONTINUED | OUTPATIENT
Start: 2022-05-23 | End: 2022-05-23 | Stop reason: HOSPADM

## 2022-05-23 RX ADMIN — Medication 500 UNITS: at 11:05

## 2022-05-23 RX ADMIN — SODIUM CHLORIDE, PRESERVATIVE FREE 10 ML: 5 INJECTION INTRAVENOUS at 11:05

## 2022-05-23 NOTE — PLAN OF CARE
Problem: Fall Injury Risk  Goal: Absence of Fall and Fall-Related Injury  Outcome: Ongoing, Progressing  Intervention: Identify and Manage Contributors  Flowsheets (Taken 5/23/2022 1139)  Self-Care Promotion: independence encouraged  Medication Review/Management: medications reviewed  Intervention: Promote Injury-Free Environment  Flowsheets (Taken 5/23/2022 1139)  Safety Promotion/Fall Prevention: medications reviewed

## 2022-05-24 ENCOUNTER — LAB VISIT (OUTPATIENT)
Dept: LAB | Facility: HOSPITAL | Age: 82
End: 2022-05-24
Attending: OBSTETRICS & GYNECOLOGY
Payer: MEDICARE

## 2022-05-24 ENCOUNTER — CLINICAL SUPPORT (OUTPATIENT)
Dept: REHABILITATION | Facility: HOSPITAL | Age: 82
End: 2022-05-24
Payer: MEDICARE

## 2022-05-24 DIAGNOSIS — Z74.09 IMPAIRED FUNCTIONAL MOBILITY AND ACTIVITY TOLERANCE: ICD-10-CM

## 2022-05-24 DIAGNOSIS — R29.898 WEAKNESS OF RIGHT LOWER EXTREMITY: Primary | ICD-10-CM

## 2022-05-24 DIAGNOSIS — C56.1 MALIGNANT NEOPLASM OF RIGHT OVARY: Primary | ICD-10-CM

## 2022-05-24 PROCEDURE — 86304 IMMUNOASSAY TUMOR CA 125: CPT | Performed by: OBSTETRICS & GYNECOLOGY

## 2022-05-24 PROCEDURE — 36415 COLL VENOUS BLD VENIPUNCTURE: CPT | Performed by: OBSTETRICS & GYNECOLOGY

## 2022-05-24 PROCEDURE — 97110 THERAPEUTIC EXERCISES: CPT | Mod: PN,CQ

## 2022-05-24 NOTE — PROGRESS NOTES
"[facility]  Outpatient Physical Therapy Daily Treatment Note      Name: Alexa Otero  Clinic Number: [id number]  Visit Date: 5/24/2022    Therapy Diagnosis:   Encounter Diagnoses   Name Primary?    Weakness of right lower extremity Yes    Impaired functional mobility and activity tolerance        Physician: Kp Gutierres,*  Physician Orders: PT Eval and Treat  Medical Diagnosis from Referral:   M25.551 (ICD-10-CM) - Right hip pain   S72.141D (ICD-10-CM) - Closed fracture of femur, intertrochanteric, right, with routine healing, subsequent encounter      Evaluation Date: 3/28/2022  Authorization Period Start - Expiration: 03/25/22 - 12/31/22  Plan of Care Certification Period: 3/28/22 - 6/28/22  Progress Note Due: 4/28  Visit # / Visits authorized: 13/ 12  (13/12 POC)  FOTO: Issued Visit #: 1, 5     PTA Visit #: 0/5     FOTO Due Visit 5 -  Follow up  FOTO Due Visit 10 - Follow up    Time In: 830  Time Out: 915  Total Billable Timed: 45 minutes  Total Billable Un-timed: 0 minutes    Precautions: Standard    Subjective     The patient reports: yesterday it was aching all day long, today the ache is in her groin when she walks. She is rarely doing her exercises at home.   Response to previous treatment: no issues reported   Functional change: able to sleep better     Pain: 1/10  Location: right low back into right hip and groin     Objective       Alexa received therapeutic exercises to develop strength, endurance, ROM, flexibility and posture for 40 minutes including:      LTR x 30  POSTERIOR PELVIC TILT x 30  Hip ADD ball x 30  PPT with Hip ABD Green TheraBand x 30  DKTC with PB x 30  Bridges small range x 30   S/L clam with pillow btw legs x 30  Hip flexor stretch off mat 2 x 30"   Piriformis stretch push away 2 x 30" B   Supine Hs stretch with strap 2 x 30" hold B       Roller to right hip musculature x 5 min       Patient Education and HEP     She was compliant with home " exercise program.    Education provided:   - Home Exercise Program, walk as much as possible    Written Home Exercises Provided: Patient instructed to cont prior HEP.  Exercises were reviewed and Alexa was able to demonstrate them prior to the end of the session.  Alexa demonstrated fair  understanding of the education provided.     See EMR under Patient Instructions for exercises provided prior visit.    Assessment     Patient encouraged to perform her HEP to help increase carry over to each session while also decreasing pain and improving strength. Alexa performed exercises well, still challenged with RLE strength and flexibility. Slight discomfort into the right groin during bridges, but decreased once exercise was stopped. Side lying clams performed with pillow between legs to decrease discomfort during eccentric phase of exercise with focus on strengthening hip abductors/extensors. Continue to progress strength in hip abductors/extensors of RLE as well as flexibility.       Pt will continue to benefit from skilled outpatient physical therapy to address the deficits listed in the problem list box on initial evaluation, provide pt/family education and to maximize pt's level of independence in the home and community environment.     Alexa Is progressing well towards her goals.   Pt prognosis is Good.     Pt's spiritual, cultural and educational needs considered and pt agreeable to plan of care and goals.    Anticipated barriers to physical therapy: none    Goals:   Short Term Goals (STG) # weeks Goal Review Date Reviewed Date Met   Pt will demonstrate independence with initial HEP to facilitate therex progression and improvements in functional mobility 1 NOT MET  5/24/2022       The patient will increase bilateral lower extremity strength to greater than or equal to 1/3 manual muscle grade for all deficient areas in order to facilitate proper lifting mechanics 3 NOT MET  5/24/2022       Patient will reduce pain  level to 2/10 2 MET  5/24/2022       Patient will be able to ascend/descend stairs with no increased pain. 2 NOT MET  5/24/2022          Long Term Goals (LTG) # weeks Goal Review Date Reviewed Date Met   The patient will improve the Lower Extremity Functional Scale to less than 31% Disability 6 NOT MET  5/24/2022       The patient will increase bilateral lower extremity strength to greater than or equal to 1 manual muscle grade for all deficient areas in order to get in and out of vehicle with no limitations 6 NOT MET  5/24/2022       The patient will increase bilateral hip ROM to WNL in order to improve stride length for more normalized gait pattern. 6 NOT MET  5/24/2022       Pt will perform full day of work without pain. 6 NOT MET  5/24/2022       Pt will return to gym/sports/normal recreational activities  6 NOT MET  5/24/2022              Plan     Continue with the plan of care.      Susy Euceda, PTA

## 2022-05-26 ENCOUNTER — CLINICAL SUPPORT (OUTPATIENT)
Dept: REHABILITATION | Facility: HOSPITAL | Age: 82
End: 2022-05-26
Payer: MEDICARE

## 2022-05-26 DIAGNOSIS — R29.898 WEAKNESS OF RIGHT LOWER EXTREMITY: Primary | ICD-10-CM

## 2022-05-26 DIAGNOSIS — Z74.09 IMPAIRED FUNCTIONAL MOBILITY AND ACTIVITY TOLERANCE: ICD-10-CM

## 2022-05-26 PROCEDURE — 97140 MANUAL THERAPY 1/> REGIONS: CPT | Mod: PN

## 2022-05-26 PROCEDURE — 97110 THERAPEUTIC EXERCISES: CPT | Mod: PN

## 2022-05-26 NOTE — PROGRESS NOTES
"[facility]  Outpatient Physical Therapy Daily Treatment Note      Name: Alexa Otero  Clinic Number: [id number]  Visit Date: 5/26/2022    Therapy Diagnosis:   Encounter Diagnoses   Name Primary?    Weakness of right lower extremity Yes    Impaired functional mobility and activity tolerance        Physician: Kp Gutierres,*  Physician Orders: PT Eval and Treat  Medical Diagnosis from Referral:   M25.551 (ICD-10-CM) - Right hip pain   S72.141D (ICD-10-CM) - Closed fracture of femur, intertrochanteric, right, with routine healing, subsequent encounter      Evaluation Date: 3/28/2022  Authorization Period Start - Expiration: 03/25/22 - 12/31/22  Plan of Care Certification Period: 3/28/22 - 6/28/22  Progress Note Due: 4/28  Visit # / Visits authorized: 1/ 8  (13/20 POC)  FOTO: Issued Visit #: 1, 5     PTA Visit #: 0/5     FOTO Due Visit 5 -  Follow up  FOTO Due Visit 10 - Follow up    Time In: 800  Time Out: 845  Total Billable Timed: 45 minutes  Total Billable Un-timed: 0 minutes    Precautions: Standard    Subjective     The patient reports: She feels about the same, she is still having groin pain trouble sit to stand   Response to previous treatment: no issues reported   Functional change: able to sleep better     Pain: 1/10  Location: right low back into right hip and groin     Objective       Alexa received therapeutic exercises to develop strength, endurance, ROM, flexibility and posture for 40 minutes including:      LTR x 30  POSTERIOR PELVIC TILT x 30  Hip ADD ball x 30  PPT with Hip ABD Green TheraBand x 30  DKTC with PB x 30  Bridges small range x 30   S/L clam with pillow btw legs x 30  S/L Hip IR block between knees  Hip flexor stretch off mat 2 x 30"   Piriformis stretch push away 2 x 30" B   Supine Hs stretch with strap 2 x 30" hold B   Shuttle 37# x 20  Shuttle 12# x 20  Stand Hip Ext x 20    Manual Hip IR/ER, distraction and Posterior mobs          Roller to right " hip musculature x 5 min       Patient Education and HEP     She was compliant with home exercise program.    Education provided:   - Home Exercise Program, walk as much as possible    Written Home Exercises Provided: Patient instructed to cont prior HEP.  Exercises were reviewed and Alexa was able to demonstrate them prior to the end of the session.  Alexa demonstrated fair  understanding of the education provided.     See EMR under Patient Instructions for exercises provided prior visit.    Assessment     Patient encouraged to perform her HEP to help increase carry over to each session while also decreasing pain and improving strength. Alexa performed exercises well, still challenged with RLE strength and flexibility.  Side lying clams performed with pillow between legs to decrease discomfort during eccentric phase of exercise with focus on strengthening hip abductors/extensors. Poor hip IR and Ext. Continue to progress strength in hip abductors/extensors of RLE as well as flexibility.       Pt will continue to benefit from skilled outpatient physical therapy to address the deficits listed in the problem list box on initial evaluation, provide pt/family education and to maximize pt's level of independence in the home and community environment.     Alexa Is progressing well towards her goals.   Pt prognosis is Good.     Pt's spiritual, cultural and educational needs considered and pt agreeable to plan of care and goals.    Anticipated barriers to physical therapy: none    Goals:   Short Term Goals (STG) # weeks Goal Review Date Reviewed Date Met   Pt will demonstrate independence with initial HEP to facilitate therex progression and improvements in functional mobility 1 NOT MET  5/26/2022       The patient will increase bilateral lower extremity strength to greater than or equal to 1/3 manual muscle grade for all deficient areas in order to facilitate proper lifting mechanics 3 NOT MET  5/26/2022       Patient will  reduce pain level to 2/10 2 MET  5/26/2022       Patient will be able to ascend/descend stairs with no increased pain. 2 NOT MET  5/26/2022          Long Term Goals (LTG) # weeks Goal Review Date Reviewed Date Met   The patient will improve the Lower Extremity Functional Scale to less than 31% Disability 6 NOT MET  5/26/2022       The patient will increase bilateral lower extremity strength to greater than or equal to 1 manual muscle grade for all deficient areas in order to get in and out of vehicle with no limitations 6 NOT MET  5/26/2022       The patient will increase bilateral hip ROM to WNL in order to improve stride length for more normalized gait pattern. 6 NOT MET  5/26/2022       Pt will perform full day of work without pain. 6 NOT MET  5/26/2022       Pt will return to gym/sports/normal recreational activities  6 NOT MET  5/26/2022              Plan     Continue with the plan of care.      Garrett Rawls, PT

## 2022-05-27 LAB — CANCER AG125 SERPL-ACNC: 9.5 U/ML (ref 0–38.1)

## 2022-06-02 ENCOUNTER — CLINICAL SUPPORT (OUTPATIENT)
Dept: REHABILITATION | Facility: HOSPITAL | Age: 82
End: 2022-06-02
Payer: MEDICARE

## 2022-06-02 DIAGNOSIS — Z74.09 IMPAIRED FUNCTIONAL MOBILITY AND ACTIVITY TOLERANCE: ICD-10-CM

## 2022-06-02 DIAGNOSIS — R29.898 WEAKNESS OF RIGHT LOWER EXTREMITY: Primary | ICD-10-CM

## 2022-06-02 PROCEDURE — 97110 THERAPEUTIC EXERCISES: CPT | Mod: PN

## 2022-06-02 NOTE — PROGRESS NOTES
"[facility]  Outpatient Physical Therapy Daily Treatment Note      Name: Alexa Otero  Clinic Number: [id number]  Visit Date: 6/2/2022    Therapy Diagnosis:   Encounter Diagnoses   Name Primary?    Weakness of right lower extremity Yes    Impaired functional mobility and activity tolerance        Physician: Kp Gutierres,*  Physician Orders: PT Eval and Treat  Medical Diagnosis from Referral:   M25.551 (ICD-10-CM) - Right hip pain   S72.141D (ICD-10-CM) - Closed fracture of femur, intertrochanteric, right, with routine healing, subsequent encounter      Evaluation Date: 3/28/2022  Authorization Period Start - Expiration: 03/25/22 - 12/31/22  Plan of Care Certification Period: 3/28/22 - 6/28/22  Progress Note Due: 4/28  Visit # / Visits authorized: 1/ 8  (14/20 POC)  FOTO: Issued Visit #: 1, 5     PTA Visit #: 0/5     FOTO Due Visit 5 -  Follow up  FOTO Due Visit 10 - Follow up    Time In: 230  Time Out: 315  Total Billable Timed: 45 minutes  Total Billable Un-timed: 0 minutes    Precautions: Standard    Subjective     The patient reports: She feels better today, was able to go out of town, car ride was tolerable   Response to previous treatment: no issues reported   Functional change: tolerated long car ride    Pain: 1/10  Location: right low back into right hip and groin     Objective       Alexa received therapeutic exercises to develop strength, endurance, ROM, flexibility and posture for 40 minutes including:      LTR x 30  POSTERIOR PELVIC TILT x 30  Hip ADD ball x 30  PPT with Hip ABD Green TheraBand x 30  DKTC with PB x 30  Bridges small range x 30   S/L clam with pillow btw legs x 30  S/L Hip IR block between knees  Hip flexor stretch off mat 2 x 30"   Piriformis stretch push away 2 x 30" B   Supine Hs stretch with strap 2 x 30" hold B   Shuttle 37# x 20  Shuttle 12# x 20  Stand Hip Ext x 20  Step up 4" x 10  Lateral step up 4" x 10  Mini squats x 10    Manual Hip IR/ER, " distraction and Posterior mobs          Roller to right hip musculature x 5 min       Patient Education and HEP     She was compliant with home exercise program.    Education provided:   - Home Exercise Program, walk as much as possible    Written Home Exercises Provided: Patient instructed to cont prior HEP.  Exercises were reviewed and Alexa was able to demonstrate them prior to the end of the session.  Alexa demonstrated fair  understanding of the education provided.     See EMR under Patient Instructions for exercises provided prior visit.    Assessment     Patient encouraged to perform her HEP to help increase carry over to each session while also decreasing pain and improving strength. Alexa performed exercises well, still challenged with RLE strength and flexibility. Poor hip IR and Ext, her IR is getting better with motion, but still weak. Needs to stay active outside of therapy. Continue to progress strength in hip abductors/extensors of RLE as well as flexibility.       Pt will continue to benefit from skilled outpatient physical therapy to address the deficits listed in the problem list box on initial evaluation, provide pt/family education and to maximize pt's level of independence in the home and community environment.     Alexa Is progressing well towards her goals.   Pt prognosis is Good.     Pt's spiritual, cultural and educational needs considered and pt agreeable to plan of care and goals.    Anticipated barriers to physical therapy: none    Goals:   Short Term Goals (STG) # weeks Goal Review Date Reviewed Date Met   Pt will demonstrate independence with initial HEP to facilitate therex progression and improvements in functional mobility 1 NOT MET  6/2/2022 6/2/2022     The patient will increase bilateral lower extremity strength to greater than or equal to 1/3 manual muscle grade for all deficient areas in order to facilitate proper lifting mechanics 3 NOT MET  6/2/2022       Patient will  reduce pain level to 2/10 2 MET  6/2/2022 6/2/2022      Patient will be able to ascend/descend stairs with no increased pain. 2 NOT MET  6/2/2022          Long Term Goals (LTG) # weeks Goal Review Date Reviewed Date Met   The patient will improve the Lower Extremity Functional Scale to less than 31% Disability 6 NOT MET  6/2/2022       The patient will increase bilateral lower extremity strength to greater than or equal to 1 manual muscle grade for all deficient areas in order to get in and out of vehicle with no limitations 6 NOT MET  6/2/2022       The patient will increase bilateral hip ROM to WNL in order to improve stride length for more normalized gait pattern. 6 NOT MET  6/2/2022       Pt will perform full day of work without pain. 6 NOT MET  6/2/2022       Pt will return to gym/sports/normal recreational activities  6 NOT MET  6/2/2022              Plan     Continue with the plan of care.      Garrett Rawls, PT

## 2022-06-07 ENCOUNTER — PATIENT MESSAGE (OUTPATIENT)
Dept: FAMILY MEDICINE | Facility: CLINIC | Age: 82
End: 2022-06-07
Payer: MEDICARE

## 2022-06-07 ENCOUNTER — CLINICAL SUPPORT (OUTPATIENT)
Dept: REHABILITATION | Facility: HOSPITAL | Age: 82
End: 2022-06-07
Payer: MEDICARE

## 2022-06-07 DIAGNOSIS — Z74.09 IMPAIRED FUNCTIONAL MOBILITY AND ACTIVITY TOLERANCE: ICD-10-CM

## 2022-06-07 DIAGNOSIS — R29.898 WEAKNESS OF RIGHT LOWER EXTREMITY: Primary | ICD-10-CM

## 2022-06-07 PROCEDURE — 97110 THERAPEUTIC EXERCISES: CPT | Mod: PN

## 2022-06-07 NOTE — PROGRESS NOTES
"[facility]  Outpatient Physical Therapy Daily Treatment Note      Name: Alexa Otero  Clinic Number: [id number]  Visit Date: 6/7/2022    Therapy Diagnosis:   Encounter Diagnoses   Name Primary?    Weakness of right lower extremity Yes    Impaired functional mobility and activity tolerance        Physician: Kp Gutierres,*  Physician Orders: PT Eval and Treat  Medical Diagnosis from Referral:   M25.551 (ICD-10-CM) - Right hip pain   S72.141D (ICD-10-CM) - Closed fracture of femur, intertrochanteric, right, with routine healing, subsequent encounter      Evaluation Date: 3/28/2022  Authorization Period Start - Expiration: 03/25/22 - 12/31/22  Plan of Care Certification Period: 3/28/22 - 6/28/22  Progress Note Due: 4/28  Visit # / Visits authorized: 1/ 8  (15/20 POC)  FOTO: Issued Visit #: 1, 5     PTA Visit #: 0/5     FOTO Due Visit 5 -  Follow up  FOTO Due Visit 10 - Follow up    Time In: 225  Time Out: 305  Total Billable Timed: 40 minutes  Total Billable Un-timed: 0 minutes    Precautions: Standard    Subjective     The patient reports: She is still having some hip discomfort  Response to previous treatment: no issues reported   Functional change: tolerated long car ride    Pain: 1/10  Location: right low back into right hip and groin     Objective       Alexa received therapeutic exercises to develop strength, endurance, ROM, flexibility and posture for 40 minutes including:      LTR x 30  POSTERIOR PELVIC TILT x 30  Hip ADD ball x 30  PPT with Hip ABD Green TheraBand x 30  DKTC with PB x 30  Bridges small range x 30   S/L clam with pillow btw legs x 30  S/L Hip IR block between knees  Hip flexor stretch off mat 2 x 30"   Piriformis stretch push away 2 x 30" B   Supine Hs stretch with strap 2 x 30" hold B   Shuttle 37# x 20  Shuttle 12# x 20  Stand Hip Ext x 20  Step up 4" x 10   Lateral step up 4" x 10  Mini squats x 10    Manual Hip IR/ER, distraction and Posterior " lizet          Roller to right hip musculature x 5 min       Patient Education and HEP     She was compliant with home exercise program.    Education provided:   - Home Exercise Program, walk as much as possible    Written Home Exercises Provided: Patient instructed to cont prior HEP.  Exercises were reviewed and Alexa was able to demonstrate them prior to the end of the session.  Alexa demonstrated fair  understanding of the education provided.     See EMR under Patient Instructions for exercises provided prior visit.    Assessment     Patient encouraged to perform her HEP to help increase carry over to each session while also decreasing pain and improving strength. Alexa performed exercises well, still challenged with RLE strength and flexibility. Poor hip IR and Ext, her IR is getting better with motion, but still weak. Needs to stay active outside of therapy. Continue to progress strength in hip abductors/extensors of RLE as well as flexibility.       Pt will continue to benefit from skilled outpatient physical therapy to address the deficits listed in the problem list box on initial evaluation, provide pt/family education and to maximize pt's level of independence in the home and community environment.     Alexa Is progressing well towards her goals.   Pt prognosis is Good.     Pt's spiritual, cultural and educational needs considered and pt agreeable to plan of care and goals.    Anticipated barriers to physical therapy: none    Goals:   Short Term Goals (STG) # weeks Goal Review Date Reviewed Date Met   Pt will demonstrate independence with initial HEP to facilitate therex progression and improvements in functional mobility 1 NOT MET  6/7/2022 6/7/2022     The patient will increase bilateral lower extremity strength to greater than or equal to 1/3 manual muscle grade for all deficient areas in order to facilitate proper lifting mechanics 3 NOT MET  6/7/2022       Patient will reduce pain level to 2/10 2 MET   6/7/2022   6/7/2022      Patient will be able to ascend/descend stairs with no increased pain. 2 NOT MET  6/7/2022          Long Term Goals (LTG) # weeks Goal Review Date Reviewed Date Met   The patient will improve the Lower Extremity Functional Scale to less than 31% Disability 6 NOT MET  6/7/2022       The patient will increase bilateral lower extremity strength to greater than or equal to 1 manual muscle grade for all deficient areas in order to get in and out of vehicle with no limitations 6 NOT MET  6/7/2022       The patient will increase bilateral hip ROM to WNL in order to improve stride length for more normalized gait pattern. 6 NOT MET  6/7/2022       Pt will perform full day of work without pain. 6 NOT MET  6/7/2022       Pt will return to gym/sports/normal recreational activities  6 NOT MET  6/7/2022              Plan     Continue with the plan of care.      Garrett Rawls, PT

## 2022-06-09 ENCOUNTER — CLINICAL SUPPORT (OUTPATIENT)
Dept: REHABILITATION | Facility: HOSPITAL | Age: 82
End: 2022-06-09
Payer: MEDICARE

## 2022-06-09 DIAGNOSIS — R29.898 WEAKNESS OF RIGHT LOWER EXTREMITY: Primary | ICD-10-CM

## 2022-06-09 DIAGNOSIS — Z74.09 IMPAIRED FUNCTIONAL MOBILITY AND ACTIVITY TOLERANCE: ICD-10-CM

## 2022-06-09 PROCEDURE — 97110 THERAPEUTIC EXERCISES: CPT | Mod: PN,CQ

## 2022-06-09 NOTE — PROGRESS NOTES
"[facility]  Outpatient Physical Therapy Daily Treatment Note      Name: Alexa Otero   Clinic Number: [id number]  Visit Date: 6/9/2022    Therapy Diagnosis:   Encounter Diagnoses   Name Primary?    Weakness of right lower extremity Yes    Impaired functional mobility and activity tolerance        Physician: Kp Gutierres,*  Physician Orders: PT Eval and Treat  Medical Diagnosis from Referral:   M25.551 (ICD-10-CM) - Right hip pain   S72.141D (ICD-10-CM) - Closed fracture of femur, intertrochanteric, right, with routine healing, subsequent encounter      Evaluation Date: 3/28/2022  Authorization Period Start - Expiration: 03/25/22 - 12/31/22  Plan of Care Certification Period: 3/28/22 - 6/28/22  Progress Note Due: 4/28  Visit # / Visits authorized: 2/ 8  (16/20 POC)  FOTO: Issued Visit #: 1, 5     PTA Visit #: 0/5     FOTO Due Visit 5 -  Follow up  FOTO Due Visit 10 - Follow up    Time In: 345  Time Out: 425  Total Billable Timed: 40 minutes  Total Billable Un-timed: 0 minutes    Precautions: Standard    Subjective     The patient reports: soreness upon arrival.   Response to previous treatment: no issues reported   Functional change: tolerated long car ride    Pain: 1/10  Location: right low back into right hip and groin     Objective       Alexa received therapeutic exercises to develop strength, endurance, ROM, flexibility and posture for 40 minutes including:    LTR x 30  POSTERIOR PELVIC TILT x 30  Hip ADD ball x 30  DKTC with PB x 30  Bridges small range x 30   S/L Hip IR block between knees  Hip flexor stretch off mat 2 x 30"   Piriformis stretch push away 2 x 30" B    Shuttle 37# x 20  Shuttle 12# x 20  Stand Hip Ext x 20  Step ups on staircase x 10   Lateral step ups on staircase x 10  Mini squats x 20      Manual Hip IR/ER, distraction and Posterior mobs- not today       Patient Education and HEP     She was compliant with home exercise program.    Education provided: "   - Home Exercise Program, walk as much as possible    Written Home Exercises Provided: Patient instructed to cont prior HEP.  Exercises were reviewed and Alexa was able to demonstrate them prior to the end of the session.  Alexa demonstrated fair  understanding of the education provided.     See EMR under Patient Instructions for exercises provided prior visit.    Assessment     Patient continues to have discomfort in the right glut/hip, but tolerates exercises with focus on flexibility and strengthening. Required cueing to decrease right knee genu valgus to improve strength in right knee and hip.     Pt will continue to benefit from skilled outpatient physical therapy to address the deficits listed in the problem list box on initial evaluation, provide pt/family education and to maximize pt's level of independence in the home and community environment.     Alexa Is progressing well towards her goals.   Pt prognosis is Good.     Pt's spiritual, cultural and educational needs considered and pt agreeable to plan of care and goals.    Anticipated barriers to physical therapy: none    Goals:   Short Term Goals (STG) # weeks Goal Review Date Reviewed Date Met   Pt will demonstrate independence with initial HEP to facilitate therex progression and improvements in functional mobility 1 NOT MET  6/9/2022 6/9/2022     The patient will increase bilateral lower extremity strength to greater than or equal to 1/3 manual muscle grade for all deficient areas in order to facilitate proper lifting mechanics 3 NOT MET  6/9/2022       Patient will reduce pain level to 2/10 2 MET  6/9/2022 6/9/2022      Patient will be able to ascend/descend stairs with no increased pain. 2 NOT MET  6/9/2022          Long Term Goals (LTG) # weeks Goal Review Date Reviewed Date Met   The patient will improve the Lower Extremity Functional Scale to less than 31% Disability 6 NOT MET  6/9/2022       The patient will increase bilateral lower  extremity strength to greater than or equal to 1 manual muscle grade for all deficient areas in order to get in and out of vehicle with no limitations 6 NOT MET  6/9/2022       The patient will increase bilateral hip ROM to WNL in order to improve stride length for more normalized gait pattern. 6 NOT MET  6/9/2022       Pt will perform full day of work without pain. 6 NOT MET  6/9/2022       Pt will return to gym/sports/normal recreational activities  6 NOT MET  6/9/2022              Plan     Continue with the plan of care.      Susy Euceda, PTA

## 2022-06-10 ENCOUNTER — OFFICE VISIT (OUTPATIENT)
Dept: FAMILY MEDICINE | Facility: CLINIC | Age: 82
End: 2022-06-10
Payer: MEDICARE

## 2022-06-10 VITALS
TEMPERATURE: 98 F | DIASTOLIC BLOOD PRESSURE: 70 MMHG | OXYGEN SATURATION: 98 % | HEART RATE: 91 BPM | SYSTOLIC BLOOD PRESSURE: 166 MMHG | HEIGHT: 63 IN | BODY MASS INDEX: 23.94 KG/M2 | RESPIRATION RATE: 16 BRPM | WEIGHT: 135.13 LBS

## 2022-06-10 DIAGNOSIS — J40 BRONCHITIS: ICD-10-CM

## 2022-06-10 DIAGNOSIS — R05.9 COUGH: Primary | ICD-10-CM

## 2022-06-10 PROCEDURE — 1159F MED LIST DOCD IN RCRD: CPT | Mod: CPTII,S$GLB,, | Performed by: NURSE PRACTITIONER

## 2022-06-10 PROCEDURE — 3288F PR FALLS RISK ASSESSMENT DOCUMENTED: ICD-10-PCS | Mod: CPTII,S$GLB,, | Performed by: NURSE PRACTITIONER

## 2022-06-10 PROCEDURE — 3077F SYST BP >= 140 MM HG: CPT | Mod: CPTII,S$GLB,, | Performed by: NURSE PRACTITIONER

## 2022-06-10 PROCEDURE — 3288F FALL RISK ASSESSMENT DOCD: CPT | Mod: CPTII,S$GLB,, | Performed by: NURSE PRACTITIONER

## 2022-06-10 PROCEDURE — 3078F DIAST BP <80 MM HG: CPT | Mod: CPTII,S$GLB,, | Performed by: NURSE PRACTITIONER

## 2022-06-10 PROCEDURE — 1160F RVW MEDS BY RX/DR IN RCRD: CPT | Mod: CPTII,S$GLB,, | Performed by: NURSE PRACTITIONER

## 2022-06-10 PROCEDURE — 1157F PR ADVANCE CARE PLAN OR EQUIV PRESENT IN MEDICAL RECORD: ICD-10-PCS | Mod: CPTII,S$GLB,, | Performed by: NURSE PRACTITIONER

## 2022-06-10 PROCEDURE — 1125F PR PAIN SEVERITY QUANTIFIED, PAIN PRESENT: ICD-10-PCS | Mod: CPTII,S$GLB,, | Performed by: NURSE PRACTITIONER

## 2022-06-10 PROCEDURE — 1100F PTFALLS ASSESS-DOCD GE2>/YR: CPT | Mod: CPTII,S$GLB,, | Performed by: NURSE PRACTITIONER

## 2022-06-10 PROCEDURE — 99999 PR PBB SHADOW E&M-EST. PATIENT-LVL IV: CPT | Mod: PBBFAC,,, | Performed by: NURSE PRACTITIONER

## 2022-06-10 PROCEDURE — 99213 OFFICE O/P EST LOW 20 MIN: CPT | Mod: S$GLB,,, | Performed by: NURSE PRACTITIONER

## 2022-06-10 PROCEDURE — 1157F ADVNC CARE PLAN IN RCRD: CPT | Mod: CPTII,S$GLB,, | Performed by: NURSE PRACTITIONER

## 2022-06-10 PROCEDURE — 99999 PR PBB SHADOW E&M-EST. PATIENT-LVL IV: ICD-10-PCS | Mod: PBBFAC,,, | Performed by: NURSE PRACTITIONER

## 2022-06-10 PROCEDURE — 1160F PR REVIEW ALL MEDS BY PRESCRIBER/CLIN PHARMACIST DOCUMENTED: ICD-10-PCS | Mod: CPTII,S$GLB,, | Performed by: NURSE PRACTITIONER

## 2022-06-10 PROCEDURE — 1159F PR MEDICATION LIST DOCUMENTED IN MEDICAL RECORD: ICD-10-PCS | Mod: CPTII,S$GLB,, | Performed by: NURSE PRACTITIONER

## 2022-06-10 PROCEDURE — 1125F AMNT PAIN NOTED PAIN PRSNT: CPT | Mod: CPTII,S$GLB,, | Performed by: NURSE PRACTITIONER

## 2022-06-10 PROCEDURE — 3077F PR MOST RECENT SYSTOLIC BLOOD PRESSURE >= 140 MM HG: ICD-10-PCS | Mod: CPTII,S$GLB,, | Performed by: NURSE PRACTITIONER

## 2022-06-10 PROCEDURE — 1100F PR PT FALLS ASSESS DOC 2+ FALLS/FALL W/INJURY/YR: ICD-10-PCS | Mod: CPTII,S$GLB,, | Performed by: NURSE PRACTITIONER

## 2022-06-10 PROCEDURE — 3078F PR MOST RECENT DIASTOLIC BLOOD PRESSURE < 80 MM HG: ICD-10-PCS | Mod: CPTII,S$GLB,, | Performed by: NURSE PRACTITIONER

## 2022-06-10 PROCEDURE — 99213 PR OFFICE/OUTPT VISIT, EST, LEVL III, 20-29 MIN: ICD-10-PCS | Mod: S$GLB,,, | Performed by: NURSE PRACTITIONER

## 2022-06-10 RX ORDER — BENZONATATE 200 MG/1
200 CAPSULE ORAL 3 TIMES DAILY PRN
Qty: 30 CAPSULE | Refills: 1 | Status: SHIPPED | OUTPATIENT
Start: 2022-06-10 | End: 2022-09-29

## 2022-06-10 NOTE — PROGRESS NOTES
Subjective:       Patient ID: Alexa Otero is a 82 y.o. female.    Chief Complaint: Cough    HPI   81 y/o female patient with medical problems listed as below presents for productive cough with clear phlegm and mild sore throat since this Monday. Cough is intermittent and worse at night. Denies headache, facial pain, earache, nasal symptoms, dizziness, chest pain, sob, abdominal pain, nausea, vomiting, fever, chills, generalized body ache. Patient states has taken tessalon perls for cough from old prescription and it relieves the symptoms and requests refill.   Former smoker, quit 40 years.   Patient last seen in 5/2022    Patient Active Problem List   Diagnosis    Sciatica    Syncope and collapse    Polycythemia, secondary    Hyperlipemia    Diabetes mellitus type 2, controlled    HTN (hypertension)    Acute chest pain    Vitamin D deficiency disease    Gastroesophageal reflux disease    History of Kwon's esophagus    Pancreatic pseudocyst/cyst    Hepatic cyst    Low back pain radiating to both legs    Primary osteoarthritis of right ankle    Kwon's esophagus    Low back pain    Dupuytren's contracture of both hands    Right lower quadrant abdominal swelling, mass and lump    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     Closed fracture of femur, intertrochanteric, right, with routine healing, subsequent encounter    Right hip pain    Weakness of right lower extremity    Impaired functional mobility and activity tolerance    Maintenance chemotherapy        Review of patient's allergies indicates:  No Known Allergies    Past Surgical History:   Procedure Laterality Date    AUGMENTATION OF BREAST      CHOLECYSTECTOMY      ESOPHAGOGASTRODUODENOSCOPY N/A 6/20/2018    Procedure: EGD (ESOPHAGOGASTRODUODENOSCOPY);  Surgeon: Sabino Stephenson MD;  Location: [facility];  Service: Endoscopy;  Laterality: N/A;    HYSTERECTOMY      partial    INSERTION OF TUNNELED CENTRAL VENOUS  CATHETER (CVC) WITH SUBCUTANEOUS PORT Right 10/19/2020    Procedure: INSERTION, PORT-A-CATH;  Surgeon: Misti Mcfarland MD;  Location: [facility] OR;  Service: General;  Laterality: Right;    INTRAMEDULLARY RODDING OF TROCHANTER OF FEMUR Right 11/28/2021    Procedure: INSERTION, INTRAMEDULLARY LUC, FEMUR, TROCHANTER/RIGHT TFN DR FAJARDO NOTIFIED REP;  Surgeon: Kp Fajardo MD;  Location: [facility] OR;  Service: Orthopedics;  Laterality: Right;  SKIP    ROBOT-ASSISTED LAPAROSCOPIC LYMPHADENECTOMY USING DA NELLA XI N/A 9/21/2020    Procedure: XI ROBOTIC LYMPHADENECTOMY-pelvic and kell-aortic;  Surgeon: Altagracia Ray MD;  Location: [facility] OR;  Service: OB/GYN;  Laterality: N/A;    ROBOT-ASSISTED LAPAROSCOPIC OMENTECTOMY USING DA NELLA XI N/A 9/21/2020    Procedure: XI ROBOTIC OMENTECTOMY;  Surgeon: Altagracia Ray MD;  Location: [facility] OR;  Service: OB/GYN;  Laterality: N/A;    ROBOT-ASSISTED LAPAROSCOPIC SALPINGO-OOPHORECTOMY USING DA NELLA XI Bilateral 9/21/2020    Procedure: XI ROBOTIC SALPINGO-OOPHORECTOMY;  Surgeon: Altagracia Ray MD;  Location: [facility] OR;  Service: OB/GYN;  Laterality: Bilateral;        Current Outpatient Medications:     benazepriL (LOTENSIN) 40 MG tablet, TAKE 1 TABLET(40 MG) BY MOUTH EVERY DAY, Disp: 90 tablet, Rfl: 3    ergocalciferol (ERGOCALCIFEROL) 50,000 unit Cap, Take 1 capsule (50,000 Units total) by mouth every 7 days., Disp: 12 capsule, Rfl: 1    gabapentin (NEURONTIN) 100 MG capsule, Take 1 capsule (100 mg total) by mouth 2 (two) times daily., Disp: 180 capsule, Rfl: 3    hydroCHLOROthiazide (HYDRODIURIL) 25 MG tablet, TAKE 1 TABLET(25 MG) BY MOUTH EVERY DAY, Disp: 90 tablet, Rfl: 3    meloxicam (MOBIC) 15 MG tablet, TAKE 1 TABLET(15 MG) BY MOUTH EVERY DAY, Disp: 30 tablet, Rfl: 2    metFORMIN (GLUCOPHAGE) 500 MG tablet, TAKE 1 TABLET(500 MG) BY MOUTH TWICE DAILY WITH MEALS, Disp: 180 tablet, Rfl: 3    omeprazole (PRILOSEC) 40 MG capsule, TAKE 1 CAPSULE(40 MG) BY  "MOUTH EVERY DAY, Disp: 90 capsule, Rfl: 3    simvastatin (ZOCOR) 40 MG tablet, TAKE 1 TABLET(40 MG) BY MOUTH EVERY EVENING, Disp: 90 tablet, Rfl: 3    tiZANidine (ZANAFLEX) 2 MG tablet, TAKE 2 TABLETS(4 MG) BY MOUTH EVERY 6 HOURS AS NEEDED FOR SPASMS, Disp: 30 tablet, Rfl: 0    VIT A/VIT C/VIT E/ZINC/COPPER (ICAPS AREDS ORAL), Take 1 capsule by mouth 2 (two) times a day. , Disp: , Rfl:     benzonatate (TESSALON) 200 MG capsule, Take 1 capsule (200 mg total) by mouth 3 (three) times daily as needed for Cough., Disp: 30 capsule, Rfl: 1    Lab Results   Component Value Date    WBC 6.17 04/28/2022    HGB 11.5 (L) 04/28/2022    HCT 37.4 04/28/2022     04/28/2022    CHOL 177 04/28/2022    TRIG 99 04/28/2022    HDL 49 04/28/2022    ALT 7 (L) 04/28/2022    AST 14 04/28/2022     04/28/2022    K 3.9 04/28/2022     04/28/2022    CREATININE 1.4 04/28/2022    BUN 32 (H) 04/28/2022    CO2 31 (H) 04/28/2022    TSH 0.721 03/17/2016    GLUF 106 10/16/2015    HGBA1C 5.8 (H) 04/28/2022       Review of Systems   Constitutional: Negative for chills, diaphoresis and fever.   HENT: Positive for sore throat. Negative for congestion, ear pain, postnasal drip, rhinorrhea and sinus pain.    Respiratory: Positive for cough. Negative for chest tightness and shortness of breath.    Cardiovascular: Negative for chest pain and palpitations.   Gastrointestinal: Negative for abdominal pain.   Neurological: Negative for dizziness and headaches.       Objective:   BP (!) 166/70 (BP Location: Right arm, Patient Position: Sitting, BP Method: Medium (Manual))   Pulse 91   Temp 97.7 °F (36.5 °C) (Oral)   Resp 16   Ht 5' 3" (1.6 m)   Wt 61.3 kg (135 lb 2.3 oz)   SpO2 98%   BMI 23.94 kg/m²       Physical Exam  Constitutional:       General: She is not in acute distress.     Appearance: Normal appearance.   HENT:      Head: Atraumatic.      Mouth/Throat:      Mouth: Mucous membranes are moist.   Cardiovascular:      Rate and " Rhythm: Normal rate and regular rhythm.      Pulses: Normal pulses.      Heart sounds: Normal heart sounds.   Pulmonary:      Effort: Pulmonary effort is normal.      Breath sounds: Normal breath sounds.   Abdominal:      General: Abdomen is flat. Bowel sounds are normal.      Palpations: Abdomen is soft.   Skin:     General: Skin is warm and dry.   Neurological:      General: No focal deficit present.      Mental Status: She is alert and oriented to person, place, and time.   Psychiatric:         Mood and Affect: Mood normal.         Assessment:       1. Cough    2. Bronchitis        Plan:   1. Cough  - CTAB on exam  - benzonatate (TESSALON) 200 MG capsule; Take 1 capsule (200 mg total) by mouth 3 (three) times daily as needed for Cough.  Dispense: 30 capsule; Refill: 1  - Advised to stay well hydrated  - Advised to call us if no improvement or worsening symptoms    2. Bronchitis, viral    José Miguel DONOVAN

## 2022-06-14 ENCOUNTER — CLINICAL SUPPORT (OUTPATIENT)
Dept: REHABILITATION | Facility: HOSPITAL | Age: 82
End: 2022-06-14
Payer: MEDICARE

## 2022-06-14 DIAGNOSIS — Z74.09 IMPAIRED FUNCTIONAL MOBILITY AND ACTIVITY TOLERANCE: ICD-10-CM

## 2022-06-14 DIAGNOSIS — R29.898 WEAKNESS OF RIGHT LOWER EXTREMITY: Primary | ICD-10-CM

## 2022-06-14 PROCEDURE — 97110 THERAPEUTIC EXERCISES: CPT | Mod: PN

## 2022-06-14 NOTE — PROGRESS NOTES
"[facility]  Outpatient Physical Therapy Daily Treatment Note      Name: Alexa Otero   Clinic Number: [id number]  Visit Date: 6/14/2022    Therapy Diagnosis:   Encounter Diagnoses   Name Primary?    Weakness of right lower extremity Yes    Impaired functional mobility and activity tolerance        Physician: Kp Gutierres,*  Physician Orders: PT Eval and Treat  Medical Diagnosis from Referral:   M25.551 (ICD-10-CM) - Right hip pain   S72.141D (ICD-10-CM) - Closed fracture of femur, intertrochanteric, right, with routine healing, subsequent encounter      Evaluation Date: 3/28/2022  Authorization Period Start - Expiration: 03/25/22 - 12/31/22  Plan of Care Certification Period: 3/28/22 - 6/28/22  Progress Note Due: 4/28  Visit # / Visits authorized: 6/ 8  (18/20 POC)  FOTO: Issued Visit #: 1, 5     PTA Visit #: 0/5     FOTO Due Visit 5 -  Follow up  FOTO Due Visit 10 - Follow up    Time In: 230  Time Out: 315  Total Billable Timed: 45 minutes  Total Billable Un-timed: 0 minutes    Precautions: Standard    Subjective     The patient reports: Knee sore after last visit, better today  Response to previous treatment: no issues reported   Functional change: climbed stairs no pain    Pain: 1/10  Location: right low back into right hip and groin     Objective       Alexa received therapeutic exercises to develop strength, endurance, ROM, flexibility and posture for 40 minutes including:    LTR x 30  POSTERIOR PELVIC TILT x 30  Hip ADD ball x 30  DKTC with PB x 30  Bridges small range x 30   S/L Hip IR block between knees  Hip flexor stretch off mat 2 x 30"   Piriformis stretch push away 2 x 30" B    Shuttle 37# x 20   Shuttle 12# x 20 Hold today  Stand Hip Ext x 20   Step ups on staircase x 10   Lateral step ups on staircase x 10  Mini squats x 20      Manual Hip IR/ER, distraction and Posterior mobs- not today       Patient Education and HEP     She was compliant with home exercise " program.    Education provided:   - Home Exercise Program, walk as much as possible    Written Home Exercises Provided: Patient instructed to cont prior HEP.  Exercises were reviewed and Alexa was able to demonstrate them prior to the end of the session.  Alexa demonstrated fair  understanding of the education provided.     See EMR under Patient Instructions for exercises provided prior visit.    Assessment     Patient continues to have discomfort in the right glut/hip, but tolerates exercises with focus on flexibility and strengthening. Required cueing to decrease right knee genu valgus to improve strength in right knee and hip.     Pt will continue to benefit from skilled outpatient physical therapy to address the deficits listed in the problem list box on initial evaluation, provide pt/family education and to maximize pt's level of independence in the home and community environment.     Alexa Is progressing well towards her goals.   Pt prognosis is Good.     Pt's spiritual, cultural and educational needs considered and pt agreeable to plan of care and goals.    Anticipated barriers to physical therapy: none    Goals:   Short Term Goals (STG) # weeks Goal Review Date Reviewed Date Met   Pt will demonstrate independence with initial HEP to facilitate therex progression and improvements in functional mobility 1 NOT MET  6/14/2022 6/14/2022     The patient will increase bilateral lower extremity strength to greater than or equal to 1/3 manual muscle grade for all deficient areas in order to facilitate proper lifting mechanics 3 NOT MET  6/14/2022       Patient will reduce pain level to 2/10 2 MET  6/14/2022 6/14/2022      Patient will be able to ascend/descend stairs with no increased pain. 2 NOT MET  6/14/2022          Long Term Goals (LTG) # weeks Goal Review Date Reviewed Date Met   The patient will improve the Lower Extremity Functional Scale to less than 31% Disability 6 NOT MET  6/14/2022       The patient  will increase bilateral lower extremity strength to greater than or equal to 1 manual muscle grade for all deficient areas in order to get in and out of vehicle with no limitations 6 NOT MET  6/14/2022       The patient will increase bilateral hip ROM to WNL in order to improve stride length for more normalized gait pattern. 6 NOT MET  6/14/2022       Pt will perform full day of work without pain. 6 NOT MET  6/14/2022       Pt will return to gym/sports/normal recreational activities  6 NOT MET  6/14/2022              Plan     Continue with the plan of care.      Garrett Rawls, PT

## 2022-06-16 ENCOUNTER — CLINICAL SUPPORT (OUTPATIENT)
Dept: REHABILITATION | Facility: HOSPITAL | Age: 82
End: 2022-06-16
Payer: MEDICARE

## 2022-06-16 DIAGNOSIS — Z74.09 IMPAIRED FUNCTIONAL MOBILITY AND ACTIVITY TOLERANCE: ICD-10-CM

## 2022-06-16 DIAGNOSIS — R29.898 WEAKNESS OF RIGHT LOWER EXTREMITY: Primary | ICD-10-CM

## 2022-06-16 PROCEDURE — 97110 THERAPEUTIC EXERCISES: CPT | Mod: PN

## 2022-06-16 NOTE — PROGRESS NOTES
"[facility]  Outpatient Physical Therapy Daily Treatment Note      Name: Alexa Otero   Clinic Number: [id number]  Visit Date: 6/16/2022    Therapy Diagnosis:   Encounter Diagnoses   Name Primary?    Weakness of right lower extremity Yes    Impaired functional mobility and activity tolerance        Physician: Kp Gutierres,*  Physician Orders: PT Eval and Treat  Medical Diagnosis from Referral:   M25.551 (ICD-10-CM) - Right hip pain   S72.141D (ICD-10-CM) - Closed fracture of femur, intertrochanteric, right, with routine healing, subsequent encounter      Evaluation Date: 3/28/2022  Authorization Period Start - Expiration: 03/25/22 - 12/31/22  Plan of Care Certification Period: 3/28/22 - 6/28/22  Progress Note Due: 4/28  Visit # / Visits authorized: 6/ 8  (18/20 POC)  FOTO: Issued Visit #: 1, 5     PTA Visit #: 0/5     FOTO Due Visit 5 -  Follow up  FOTO Due Visit 10 - Follow up    Time In: 230  Time Out: 310  Total Billable Timed: 40 minutes  Total Billable Un-timed: 0 minutes    Precautions: Standard    Subjective     The patient reports: No knee pain after last visit, hip still the same  Response to previous treatment: no issues reported   Functional change: climbed stairs no pain    Pain: 1/10  Location: right low back into right hip and groin     Objective       Alexa received therapeutic exercises to develop strength, endurance, ROM, flexibility and posture for 40 minutes including:    LTR x 30  POSTERIOR PELVIC TILT x 30  Hip ADD ball x 30   DKTC with PB x 30  Bridges small range x 30   S/L Hip IR block between knees  Hip flexor stretch off mat 2 x 30"   Piriformis stretch push away 2 x 30" B    Shuttle 37# x 20   Shuttle 12# x 20 Hold today  Stand Hip Ext x 20   Step ups on staircase x 10   Lateral step ups on staircase x 10 Held today pain  Mini squats x 20      Manual Hip IR/ER, distraction and Posterior mobs- not today       Patient Education and HEP     She was " compliant with home exercise program.    Education provided:   - Home Exercise Program, walk as much as possible    Written Home Exercises Provided: Patient instructed to cont prior HEP.  Exercises were reviewed and Alexa was able to demonstrate them prior to the end of the session.  Alexa demonstrated fair  understanding of the education provided.     See EMR under Patient Instructions for exercises provided prior visit.    Assessment     Patient continues to have discomfort in the right glut/hip, but tolerates exercises with focus on flexibility and strengthening. Required cueing to decrease right knee genu valgus to improve strength in right knee and hip.     Pt will continue to benefit from skilled outpatient physical therapy to address the deficits listed in the problem list box on initial evaluation, provide pt/family education and to maximize pt's level of independence in the home and community environment.     Alexa Is progressing well towards her goals.   Pt prognosis is Good.     Pt's spiritual, cultural and educational needs considered and pt agreeable to plan of care and goals.    Anticipated barriers to physical therapy: none    Goals:   Short Term Goals (STG) # weeks Goal Review Date Reviewed Date Met   Pt will demonstrate independence with initial HEP to facilitate therex progression and improvements in functional mobility 1 NOT MET  6/16/2022 6/16/2022     The patient will increase bilateral lower extremity strength to greater than or equal to 1/3 manual muscle grade for all deficient areas in order to facilitate proper lifting mechanics 3 NOT MET  6/16/2022       Patient will reduce pain level to 2/10 2 MET  6/16/2022 6/16/2022      Patient will be able to ascend/descend stairs with no increased pain. 2 NOT MET  6/16/2022          Long Term Goals (LTG) # weeks Goal Review Date Reviewed Date Met   The patient will improve the Lower Extremity Functional Scale to less than 31% Disability 6 NOT MET   6/16/2022       The patient will increase bilateral lower extremity strength to greater than or equal to 1 manual muscle grade for all deficient areas in order to get in and out of vehicle with no limitations 6 NOT MET  6/16/2022       The patient will increase bilateral hip ROM to WNL in order to improve stride length for more normalized gait pattern. 6 NOT MET  6/16/2022       Pt will perform full day of work without pain. 6 NOT MET  6/16/2022       Pt will return to gym/sports/normal recreational activities  6 NOT MET  6/16/2022              Plan     Continue with the plan of care.      Garrett Rawls, PT

## 2022-06-21 ENCOUNTER — CLINICAL SUPPORT (OUTPATIENT)
Dept: REHABILITATION | Facility: HOSPITAL | Age: 82
End: 2022-06-21
Payer: MEDICARE

## 2022-06-21 DIAGNOSIS — R29.898 WEAKNESS OF RIGHT LOWER EXTREMITY: Primary | ICD-10-CM

## 2022-06-21 DIAGNOSIS — Z74.09 IMPAIRED FUNCTIONAL MOBILITY AND ACTIVITY TOLERANCE: ICD-10-CM

## 2022-06-21 PROCEDURE — 97110 THERAPEUTIC EXERCISES: CPT | Mod: PN,CQ

## 2022-06-21 PROCEDURE — 97140 MANUAL THERAPY 1/> REGIONS: CPT | Mod: PN,CQ

## 2022-06-21 NOTE — PROGRESS NOTES
"[facility]  Outpatient Physical Therapy Daily Treatment Note      Name: Alexa Otero   Clinic Number: [id number]  Visit Date: 6/21/2022    Therapy Diagnosis:   Encounter Diagnoses   Name Primary?    Weakness of right lower extremity Yes    Impaired functional mobility and activity tolerance        Physician: Kp Gutierres,*  Physician Orders: PT Eval and Treat  Medical Diagnosis from Referral:   M25.551 (ICD-10-CM) - Right hip pain   S72.141D (ICD-10-CM) - Closed fracture of femur, intertrochanteric, right, with routine healing, subsequent encounter      Evaluation Date: 3/28/2022  Authorization Period Start - Expiration: 03/25/22 - 12/31/22  Plan of Care Certification Period: 3/28/22 - 6/28/22  Progress Note Due: 4/28  Visit # / Visits authorized: 7/ 8  (18/20 POC)     PTA Visit #: 0/5     FOTO Due Visit 5 -  Follow up  FOTO Due Visit 10 - Follow up    Time In: 215  Time Out: 300  Total Billable Timed: 45 minutes  Total Billable Un-timed: 0 minutes    Precautions: Standard    Subjective     The patient reports: she is still having the hip pain and is unable to sleep well on that side. Still scheduled for the injection next month. Has been doing a lot of walking since her brother has been in the hospital.   Response to previous treatment: no issues reported   Functional change: climbed stairs no pain    Pain: 1/10  Location: right low back into right hip and groin     Objective       Alexa received therapeutic exercises to develop strength, endurance, ROM, flexibility and posture for 37 minutes including:    LTR x 30  POSTERIOR PELVIC TILT x 30  Hip ADD ball with TrA draw x 30   DKTC with PB x 30  Bridges small range x 30   S/L Hip IR block between knees x 30  Hip flexor stretch off mat 2 x 30"   Piriformis stretch push away 2 x 30" B    Stand Hip Ext x 20       Roller to Right hip musculature in side lying x 8 min         Patient Education and HEP     She was compliant with " home exercise program.    Education provided:   - Home Exercise Program, walk as much as possible    Written Home Exercises Provided: Patient instructed to cont prior HEP.  Exercises were reviewed and Alexa was able to demonstrate them prior to the end of the session.  Alexa demonstrated fair  understanding of the education provided.     See EMR under Patient Instructions for exercises provided prior visit.    Assessment     Patient tolerated her last session well with focus on proper form for her HEP. Encouraged to keep her injection visit with hopes it will help relieve some of her hip pain. Alexa will be discharged from therapy today per talking with evaluating PT.     Pt will continue to benefit from skilled outpatient physical therapy to address the deficits listed in the problem list box on initial evaluation, provide pt/family education and to maximize pt's level of independence in the home and community environment.     Alexa Is progressing well towards her goals.   Pt prognosis is Good.     Pt's spiritual, cultural and educational needs considered and pt agreeable to plan of care and goals.    Anticipated barriers to physical therapy: none    Goals:   Short Term Goals (STG) # weeks Goal Review Date Reviewed Date Met   Pt will demonstrate independence with initial HEP to facilitate therex progression and improvements in functional mobility 1 NOT MET  6/21/2022 6/21/2022     The patient will increase bilateral lower extremity strength to greater than or equal to 1/3 manual muscle grade for all deficient areas in order to facilitate proper lifting mechanics 3 NOT MET  6/21/2022       Patient will reduce pain level to 2/10 2 MET  6/21/2022 6/21/2022      Patient will be able to ascend/descend stairs with no increased pain. 2 NOT MET  6/21/2022          Long Term Goals (LTG) # weeks Goal Review Date Reviewed Date Met   The patient will improve the Lower Extremity Functional Scale to less than 31% Disability  6 NOT MET  6/21/2022       The patient will increase bilateral lower extremity strength to greater than or equal to 1 manual muscle grade for all deficient areas in order to get in and out of vehicle with no limitations 6 NOT MET  6/21/2022       The patient will increase bilateral hip ROM to WNL in order to improve stride length for more normalized gait pattern. 6 NOT MET  6/21/2022       Pt will perform full day of work without pain. 6 NOT MET  6/21/2022       Pt will return to gym/sports/normal recreational activities  6 NOT MET  6/21/2022              Plan     Continue with the plan of care.      Susy Euceda, PTA

## 2022-06-27 ENCOUNTER — INFUSION (OUTPATIENT)
Dept: INFUSION THERAPY | Facility: HOSPITAL | Age: 82
End: 2022-06-27
Attending: OBSTETRICS & GYNECOLOGY
Payer: MEDICARE

## 2022-06-27 VITALS
DIASTOLIC BLOOD PRESSURE: 70 MMHG | SYSTOLIC BLOOD PRESSURE: 152 MMHG | RESPIRATION RATE: 16 BRPM | WEIGHT: 129.19 LBS | TEMPERATURE: 97 F | HEART RATE: 104 BPM | OXYGEN SATURATION: 99 % | BODY MASS INDEX: 22.89 KG/M2

## 2022-06-27 DIAGNOSIS — Z51.11 MAINTENANCE CHEMOTHERAPY: Primary | ICD-10-CM

## 2022-06-27 DIAGNOSIS — C56.1 CARCINOMA OF RIGHT OVARY: ICD-10-CM

## 2022-06-27 PROCEDURE — 25000003 PHARM REV CODE 250: Performed by: OBSTETRICS & GYNECOLOGY

## 2022-06-27 PROCEDURE — 96523 IRRIG DRUG DELIVERY DEVICE: CPT

## 2022-06-27 PROCEDURE — A4216 STERILE WATER/SALINE, 10 ML: HCPCS | Performed by: OBSTETRICS & GYNECOLOGY

## 2022-06-27 PROCEDURE — 63600175 PHARM REV CODE 636 W HCPCS: Performed by: OBSTETRICS & GYNECOLOGY

## 2022-06-27 RX ORDER — HEPARIN 100 UNIT/ML
500 SYRINGE INTRAVENOUS
Status: DISCONTINUED | OUTPATIENT
Start: 2022-06-27 | End: 2022-06-27 | Stop reason: HOSPADM

## 2022-06-27 RX ORDER — HEPARIN 100 UNIT/ML
500 SYRINGE INTRAVENOUS
Status: CANCELLED | OUTPATIENT
Start: 2022-06-27

## 2022-06-27 RX ORDER — SODIUM CHLORIDE 0.9 % (FLUSH) 0.9 %
10 SYRINGE (ML) INJECTION
Status: CANCELLED | OUTPATIENT
Start: 2022-06-27

## 2022-06-27 RX ORDER — SODIUM CHLORIDE 0.9 % (FLUSH) 0.9 %
10 SYRINGE (ML) INJECTION
Status: DISCONTINUED | OUTPATIENT
Start: 2022-06-27 | End: 2022-06-27 | Stop reason: HOSPADM

## 2022-06-27 RX ADMIN — SODIUM CHLORIDE, PRESERVATIVE FREE 10 ML: 5 INJECTION INTRAVENOUS at 10:06

## 2022-06-27 RX ADMIN — HEPARIN 500 UNITS: 100 SYRINGE at 10:06

## 2022-07-06 ENCOUNTER — HOSPITAL ENCOUNTER (OUTPATIENT)
Dept: RADIOLOGY | Facility: HOSPITAL | Age: 82
Discharge: HOME | End: 2022-07-06
Attending: ORTHOPAEDIC SURGERY
Payer: MEDICARE

## 2022-07-06 DIAGNOSIS — M25.551 RIGHT HIP PAIN: ICD-10-CM

## 2022-07-06 DIAGNOSIS — S72.141D CLOSED FRACTURE OF FEMUR, INTERTROCHANTERIC, RIGHT, WITH ROUTINE HEALING, SUBSEQUENT ENCOUNTER: ICD-10-CM

## 2022-07-06 PROCEDURE — 77002 FL ASPIRATION INJECTION MAJOR JOINT RIGHT WITH FLUORO: ICD-10-PCS | Mod: 26,,, | Performed by: RADIOLOGY

## 2022-07-06 PROCEDURE — 63600175 PHARM REV CODE 636 W HCPCS: Performed by: RADIOLOGY

## 2022-07-06 PROCEDURE — 77002 NEEDLE LOCALIZATION BY XRAY: CPT | Mod: 26,,, | Performed by: RADIOLOGY

## 2022-07-06 PROCEDURE — 20610 DRAIN/INJ JOINT/BURSA W/O US: CPT | Mod: RT,,, | Performed by: RADIOLOGY

## 2022-07-06 PROCEDURE — 20610 FL ASPIRATION INJECTION MAJOR JOINT RIGHT WITH FLUORO: ICD-10-PCS | Mod: RT,,, | Performed by: RADIOLOGY

## 2022-07-06 PROCEDURE — 25000003 PHARM REV CODE 250: Performed by: RADIOLOGY

## 2022-07-06 PROCEDURE — 77002 NEEDLE LOCALIZATION BY XRAY: CPT

## 2022-07-06 PROCEDURE — 25500020 PHARM REV CODE 255: Performed by: ORTHOPAEDIC SURGERY

## 2022-07-06 RX ORDER — BUPIVACAINE HYDROCHLORIDE 2.5 MG/ML
5 INJECTION, SOLUTION EPIDURAL; INFILTRATION; INTRACAUDAL ONCE
Status: COMPLETED | OUTPATIENT
Start: 2022-07-06 | End: 2022-07-06

## 2022-07-06 RX ORDER — METHYLPREDNISOLONE ACETATE 80 MG/ML
80 INJECTION, SUSPENSION INTRA-ARTICULAR; INTRALESIONAL; INTRAMUSCULAR; SOFT TISSUE ONCE
Status: COMPLETED | OUTPATIENT
Start: 2022-07-06 | End: 2022-07-06

## 2022-07-06 RX ADMIN — METHYLPREDNISOLONE ACETATE 80 MG: 80 INJECTION, SUSPENSION INTRA-ARTICULAR; INTRALESIONAL; INTRAMUSCULAR; SOFT TISSUE at 03:07

## 2022-07-06 RX ADMIN — IOHEXOL 10 ML: 350 INJECTION, SOLUTION INTRAVENOUS at 03:07

## 2022-07-06 RX ADMIN — BUPIVACAINE HYDROCHLORIDE 25 MG: 2.5 INJECTION, SOLUTION EPIDURAL; INFILTRATION; INTRACAUDAL; PERINEURAL at 03:07

## 2022-07-18 ENCOUNTER — OFFICE VISIT (OUTPATIENT)
Dept: ORTHOPEDICS | Facility: CLINIC | Age: 82
End: 2022-07-18
Payer: MEDICARE

## 2022-07-18 VITALS — WEIGHT: 129 LBS | RESPIRATION RATE: 18 BRPM | BODY MASS INDEX: 22.86 KG/M2 | HEIGHT: 63 IN

## 2022-07-18 DIAGNOSIS — S72.141D CLOSED FRACTURE OF FEMUR, INTERTROCHANTERIC, RIGHT, WITH ROUTINE HEALING, SUBSEQUENT ENCOUNTER: Primary | ICD-10-CM

## 2022-07-18 PROCEDURE — 1160F PR REVIEW ALL MEDS BY PRESCRIBER/CLIN PHARMACIST DOCUMENTED: ICD-10-PCS | Mod: CPTII,S$GLB,, | Performed by: ORTHOPAEDIC SURGERY

## 2022-07-18 PROCEDURE — 3288F FALL RISK ASSESSMENT DOCD: CPT | Mod: CPTII,S$GLB,, | Performed by: ORTHOPAEDIC SURGERY

## 2022-07-18 PROCEDURE — 99999 PR PBB SHADOW E&M-EST. PATIENT-LVL II: CPT | Mod: PBBFAC,,, | Performed by: ORTHOPAEDIC SURGERY

## 2022-07-18 PROCEDURE — 99213 PR OFFICE/OUTPT VISIT, EST, LEVL III, 20-29 MIN: ICD-10-PCS | Mod: S$GLB,,, | Performed by: ORTHOPAEDIC SURGERY

## 2022-07-18 PROCEDURE — 1160F RVW MEDS BY RX/DR IN RCRD: CPT | Mod: CPTII,S$GLB,, | Performed by: ORTHOPAEDIC SURGERY

## 2022-07-18 PROCEDURE — 3288F PR FALLS RISK ASSESSMENT DOCUMENTED: ICD-10-PCS | Mod: CPTII,S$GLB,, | Performed by: ORTHOPAEDIC SURGERY

## 2022-07-18 PROCEDURE — 1125F AMNT PAIN NOTED PAIN PRSNT: CPT | Mod: CPTII,S$GLB,, | Performed by: ORTHOPAEDIC SURGERY

## 2022-07-18 PROCEDURE — 1159F PR MEDICATION LIST DOCUMENTED IN MEDICAL RECORD: ICD-10-PCS | Mod: CPTII,S$GLB,, | Performed by: ORTHOPAEDIC SURGERY

## 2022-07-18 PROCEDURE — 99213 OFFICE O/P EST LOW 20 MIN: CPT | Mod: S$GLB,,, | Performed by: ORTHOPAEDIC SURGERY

## 2022-07-18 PROCEDURE — 1157F PR ADVANCE CARE PLAN OR EQUIV PRESENT IN MEDICAL RECORD: ICD-10-PCS | Mod: CPTII,S$GLB,, | Performed by: ORTHOPAEDIC SURGERY

## 2022-07-18 PROCEDURE — 1159F MED LIST DOCD IN RCRD: CPT | Mod: CPTII,S$GLB,, | Performed by: ORTHOPAEDIC SURGERY

## 2022-07-18 PROCEDURE — 1157F ADVNC CARE PLAN IN RCRD: CPT | Mod: CPTII,S$GLB,, | Performed by: ORTHOPAEDIC SURGERY

## 2022-07-18 PROCEDURE — 1101F PT FALLS ASSESS-DOCD LE1/YR: CPT | Mod: CPTII,S$GLB,, | Performed by: ORTHOPAEDIC SURGERY

## 2022-07-18 PROCEDURE — 1101F PR PT FALLS ASSESS DOC 0-1 FALLS W/OUT INJ PAST YR: ICD-10-PCS | Mod: CPTII,S$GLB,, | Performed by: ORTHOPAEDIC SURGERY

## 2022-07-18 PROCEDURE — 1125F PR PAIN SEVERITY QUANTIFIED, PAIN PRESENT: ICD-10-PCS | Mod: CPTII,S$GLB,, | Performed by: ORTHOPAEDIC SURGERY

## 2022-07-18 PROCEDURE — 99999 PR PBB SHADOW E&M-EST. PATIENT-LVL II: ICD-10-PCS | Mod: PBBFAC,,, | Performed by: ORTHOPAEDIC SURGERY

## 2022-07-18 NOTE — PROGRESS NOTES
Past Medical History:   Diagnosis Date    Arthritis     Cataract     Diabetes mellitus type II 2012    Encounter for blood transfusion     Extra-ovarian endometrioid adenocarcinoma 2020    GERD (gastroesophageal reflux disease)     Hyperlipidemia     Hypertension     Macular degeneration     PONV (postoperative nausea and vomiting)     Squamous cell carcinoma 1980's    precancer of cervix       Past Surgical History:   Procedure Laterality Date    AUGMENTATION OF BREAST      CHOLECYSTECTOMY      ESOPHAGOGASTRODUODENOSCOPY N/A 6/20/2018    Procedure: EGD (ESOPHAGOGASTRODUODENOSCOPY);  Surgeon: Sabino Stephenson MD;  Location: [facility];  Service: Endoscopy;  Laterality: N/A;    HYSTERECTOMY      partial    INSERTION OF TUNNELED CENTRAL VENOUS CATHETER (CVC) WITH SUBCUTANEOUS PORT Right 10/19/2020    Procedure: INSERTION, PORT-A-CATH;  Surgeon: Misti Mcfarland MD;  Location: [facility];  Service: General;  Laterality: Right;    INTRAMEDULLARY RODDING OF TROCHANTER OF FEMUR Right 11/28/2021    Procedure: INSERTION, INTRAMEDULLARY LUC, FEMUR, TROCHANTER/RIGHT TFN DR FAJARDO NOTIFIED REP;  Surgeon: Kp Fajardo MD;  Location: [facility];  Service: Orthopedics;  Laterality: Right;  SKIP    ROBOT-ASSISTED LAPAROSCOPIC LYMPHADENECTOMY USING DA NELLA XI N/A 9/21/2020    Procedure: XI ROBOTIC LYMPHADENECTOMY-pelvic and kell-aortic;  Surgeon: Altagracia Ray MD;  Location: [facility];  Service: OB/GYN;  Laterality: N/A;    ROBOT-ASSISTED LAPAROSCOPIC OMENTECTOMY USING DA NELLA XI N/A 9/21/2020    Procedure: XI ROBOTIC OMENTECTOMY;  Surgeon: Altagracia Ray MD;  Location: [facility];  Service: OB/GYN;  Laterality: N/A;    ROBOT-ASSISTED LAPAROSCOPIC SALPINGO-OOPHORECTOMY USING DA NELLA XI Bilateral 9/21/2020    Procedure: XI ROBOTIC SALPINGO-OOPHORECTOMY;  Surgeon: Altagracia Ray MD;  Location: [facility] OR;  Service: OB/GYN;  Laterality: Bilateral;       Current Outpatient Medications   Medication  Sig    benazepriL (LOTENSIN) 40 MG tablet TAKE 1 TABLET(40 MG) BY MOUTH EVERY DAY    benzonatate (TESSALON) 200 MG capsule Take 1 capsule (200 mg total) by mouth 3 (three) times daily as needed for Cough.    ergocalciferol (ERGOCALCIFEROL) 50,000 unit Cap Take 1 capsule (50,000 Units total) by mouth every 7 days.    gabapentin (NEURONTIN) 100 MG capsule Take 1 capsule (100 mg total) by mouth 2 (two) times daily.    hydroCHLOROthiazide (HYDRODIURIL) 25 MG tablet TAKE 1 TABLET(25 MG) BY MOUTH EVERY DAY    meloxicam (MOBIC) 15 MG tablet TAKE 1 TABLET(15 MG) BY MOUTH EVERY DAY    metFORMIN (GLUCOPHAGE) 500 MG tablet TAKE 1 TABLET(500 MG) BY MOUTH TWICE DAILY WITH MEALS    omeprazole (PRILOSEC) 40 MG capsule TAKE 1 CAPSULE(40 MG) BY MOUTH EVERY DAY    simvastatin (ZOCOR) 40 MG tablet TAKE 1 TABLET(40 MG) BY MOUTH EVERY EVENING    tiZANidine (ZANAFLEX) 2 MG tablet TAKE 2 TABLETS(4 MG) BY MOUTH EVERY 6 HOURS AS NEEDED FOR SPASMS    VIT A/VIT C/VIT E/ZINC/COPPER (ICAPS AREDS ORAL) Take 1 capsule by mouth 2 (two) times a day.      No current facility-administered medications for this visit.       Review of patient's allergies indicates:  No Known Allergies    Family History   Problem Relation Age of Onset    Cancer Mother 57        lung    Cancer Father 56        oral    Skin cancer Sister     Breast cancer Maternal Grandmother     Breast cancer Paternal Grandmother     Skin cancer Brother     Melanoma Brother     Skin cancer Brother     Psoriasis Neg Hx     Lupus Neg Hx     Eczema Neg Hx        Social History     Socioeconomic History    Marital status:    Tobacco Use    Smoking status: Former Smoker     Packs/day: 1.00     Years: 20.00     Pack years: 20.00     Types: Cigarettes    Smokeless tobacco: Never Used    Tobacco comment: quit 40 yrs ago   Substance and Sexual Activity    Alcohol use: Yes     Alcohol/week: 2.0 standard drinks     Types: 2 Glasses of wine per week     Comment:  occasional    Drug use: No    Sexual activity: Not Currently     Partners: Male     Social Determinants of Health     Financial Resource Strain: Medium Risk    Difficulty of Paying Living Expenses: Somewhat hard   Food Insecurity: No Food Insecurity    Worried About Running Out of Food in the Last Year: Never true    Ran Out of Food in the Last Year: Never true   Transportation Needs: No Transportation Needs    Lack of Transportation (Medical): No    Lack of Transportation (Non-Medical): No   Physical Activity: Insufficiently Active    Days of Exercise per Week: 2 days    Minutes of Exercise per Session: 40 min   Stress: No Stress Concern Present    Feeling of Stress : Only a little   Social Connections: Unknown    Frequency of Communication with Friends and Family: More than three times a week    Frequency of Social Gatherings with Friends and Family: Three times a week    Active Member of Clubs or Organizations: No    Attends Club or Organization Meetings: Patient refused    Marital Status:    Housing Stability: Low Risk     Unable to Pay for Housing in the Last Year: No    Number of Places Lived in the Last Year: 1    Unstable Housing in the Last Year: No       Chief Complaint:   Chief Complaint   Patient presents with    Right Hip - Pain       Date of surgery:  November 28, 2021    History of present illness:  82-year-old female underwent IM nailing for right intertrochanteric femur fracture.  She is doing pretty well.  Had an episode a couple of months before where she had a sharp pain kind of in her buttocks and traveled down the back of her leg to her knee.  Making good progress with physical therapy on strength.  Still having lot of pain in the groin and at night.  We tried an intra-articular injection to see if the groin pain would resolve and it really has not helped.    Review of Systems:    Musculoskeletal:  See HPI        Physical Examination:    Vital Signs:    Vitals:     07/18/22 1021   Resp: 18       Body mass index is 22.85 kg/m².    This a well-developed, well nourished patient in no acute distress.  They are alert and oriented and cooperative to examination.  Pt. walks without an antalgic gait.    Examination of the right hip shows healed surgical incisions.  No erythema drainage.  Minimal pain with range of motion of the hip.  Is a little tight on the right hip with range of motion    X-rays:  X-rays of the right hip are  review which show a tati and screw in position.  There is some displacement of a greater trochanteric tip fracture.  Interval healing is noted.  X-rays of the right knee are  reviewed which show some moderate arthritis in both knees with medial joint space narrowing.     Assessment::  Status post IM nailing right intertrochanteric hip fracture  Right hip weakness and sciatica versus piriformis syndrome    Plan:  Reviewed the x-rays with her today.  Fracture appears to be healing.  I recommended consultation with Pain to see if  targeted injection near the piriformis or possible epidural steroid injection would be beneficial.      This note was created using [employer] voice recognition software that occasionally misinterpreted phrases or words.

## 2022-08-08 ENCOUNTER — INFUSION (OUTPATIENT)
Dept: INFUSION THERAPY | Facility: HOSPITAL | Age: 82
End: 2022-08-08
Attending: OBSTETRICS & GYNECOLOGY
Payer: MEDICARE

## 2022-08-08 VITALS
BODY MASS INDEX: 24.45 KG/M2 | HEIGHT: 63 IN | RESPIRATION RATE: 17 BRPM | HEART RATE: 92 BPM | OXYGEN SATURATION: 98 % | SYSTOLIC BLOOD PRESSURE: 157 MMHG | TEMPERATURE: 98 F | WEIGHT: 138 LBS | DIASTOLIC BLOOD PRESSURE: 75 MMHG

## 2022-08-08 DIAGNOSIS — Z51.11 MAINTENANCE CHEMOTHERAPY: Primary | ICD-10-CM

## 2022-08-08 DIAGNOSIS — C56.1 CARCINOMA OF RIGHT OVARY: ICD-10-CM

## 2022-08-08 PROCEDURE — 63600175 PHARM REV CODE 636 W HCPCS: Performed by: OBSTETRICS & GYNECOLOGY

## 2022-08-08 PROCEDURE — 86304 IMMUNOASSAY TUMOR CA 125: CPT | Performed by: OBSTETRICS & GYNECOLOGY

## 2022-08-08 PROCEDURE — 25000003 PHARM REV CODE 250: Performed by: OBSTETRICS & GYNECOLOGY

## 2022-08-08 PROCEDURE — A4216 STERILE WATER/SALINE, 10 ML: HCPCS | Performed by: OBSTETRICS & GYNECOLOGY

## 2022-08-08 PROCEDURE — 36591 DRAW BLOOD OFF VENOUS DEVICE: CPT

## 2022-08-08 RX ORDER — SODIUM CHLORIDE 0.9 % (FLUSH) 0.9 %
10 SYRINGE (ML) INJECTION
Status: CANCELLED | OUTPATIENT
Start: 2022-08-08

## 2022-08-08 RX ORDER — HEPARIN 100 UNIT/ML
500 SYRINGE INTRAVENOUS
Status: CANCELLED | OUTPATIENT
Start: 2022-08-08

## 2022-08-08 RX ORDER — SODIUM CHLORIDE 0.9 % (FLUSH) 0.9 %
10 SYRINGE (ML) INJECTION
Status: DISCONTINUED | OUTPATIENT
Start: 2022-08-08 | End: 2022-08-08 | Stop reason: HOSPADM

## 2022-08-08 RX ORDER — HEPARIN 100 UNIT/ML
500 SYRINGE INTRAVENOUS
Status: DISCONTINUED | OUTPATIENT
Start: 2022-08-08 | End: 2022-08-08 | Stop reason: HOSPADM

## 2022-08-08 RX ADMIN — SODIUM CHLORIDE, PRESERVATIVE FREE 10 ML: 5 INJECTION INTRAVENOUS at 11:08

## 2022-08-08 RX ADMIN — HEPARIN 500 UNITS: 100 SYRINGE at 11:08

## 2022-08-08 NOTE — PLAN OF CARE
Problem: Fatigue  Goal: Improved Activity Tolerance  Intervention: Promote Improved Energy  Flowsheets (Taken 8/8/2022 1128)  Fatigue Management:   paced activity encouraged   frequent rest breaks encouraged  Activity Management: Ambulated -L4

## 2022-08-10 LAB — CANCER AG125 SERPL-ACNC: 8.1 U/ML (ref 0–38.1)

## 2022-08-27 ENCOUNTER — OFFICE VISIT (OUTPATIENT)
Dept: URGENT CARE | Facility: CLINIC | Age: 82
End: 2022-08-27
Payer: MEDICARE

## 2022-08-27 VITALS
SYSTOLIC BLOOD PRESSURE: 170 MMHG | HEIGHT: 63 IN | OXYGEN SATURATION: 100 % | BODY MASS INDEX: 24.27 KG/M2 | HEART RATE: 91 BPM | RESPIRATION RATE: 16 BRPM | DIASTOLIC BLOOD PRESSURE: 83 MMHG | TEMPERATURE: 98 F | WEIGHT: 137 LBS

## 2022-08-27 DIAGNOSIS — H93.8X2 EAR FULLNESS, LEFT: ICD-10-CM

## 2022-08-27 DIAGNOSIS — R09.82 POST-NASAL DRIP: Primary | ICD-10-CM

## 2022-08-27 PROCEDURE — 1159F PR MEDICATION LIST DOCUMENTED IN MEDICAL RECORD: ICD-10-PCS | Mod: CPTII,S$GLB,, | Performed by: NURSE PRACTITIONER

## 2022-08-27 PROCEDURE — 3077F PR MOST RECENT SYSTOLIC BLOOD PRESSURE >= 140 MM HG: ICD-10-PCS | Mod: CPTII,S$GLB,, | Performed by: NURSE PRACTITIONER

## 2022-08-27 PROCEDURE — 3079F DIAST BP 80-89 MM HG: CPT | Mod: CPTII,S$GLB,, | Performed by: NURSE PRACTITIONER

## 2022-08-27 PROCEDURE — 1159F MED LIST DOCD IN RCRD: CPT | Mod: CPTII,S$GLB,, | Performed by: NURSE PRACTITIONER

## 2022-08-27 PROCEDURE — 1157F PR ADVANCE CARE PLAN OR EQUIV PRESENT IN MEDICAL RECORD: ICD-10-PCS | Mod: CPTII,S$GLB,, | Performed by: NURSE PRACTITIONER

## 2022-08-27 PROCEDURE — 3079F PR MOST RECENT DIASTOLIC BLOOD PRESSURE 80-89 MM HG: ICD-10-PCS | Mod: CPTII,S$GLB,, | Performed by: NURSE PRACTITIONER

## 2022-08-27 PROCEDURE — 3077F SYST BP >= 140 MM HG: CPT | Mod: CPTII,S$GLB,, | Performed by: NURSE PRACTITIONER

## 2022-08-27 PROCEDURE — 99213 OFFICE O/P EST LOW 20 MIN: CPT | Mod: S$GLB,,, | Performed by: NURSE PRACTITIONER

## 2022-08-27 PROCEDURE — 1160F PR REVIEW ALL MEDS BY PRESCRIBER/CLIN PHARMACIST DOCUMENTED: ICD-10-PCS | Mod: CPTII,S$GLB,, | Performed by: NURSE PRACTITIONER

## 2022-08-27 PROCEDURE — 1157F ADVNC CARE PLAN IN RCRD: CPT | Mod: CPTII,S$GLB,, | Performed by: NURSE PRACTITIONER

## 2022-08-27 PROCEDURE — 1160F RVW MEDS BY RX/DR IN RCRD: CPT | Mod: CPTII,S$GLB,, | Performed by: NURSE PRACTITIONER

## 2022-08-27 PROCEDURE — 99213 PR OFFICE/OUTPT VISIT, EST, LEVL III, 20-29 MIN: ICD-10-PCS | Mod: S$GLB,,, | Performed by: NURSE PRACTITIONER

## 2022-08-27 RX ORDER — AZELASTINE 1 MG/ML
1 SPRAY, METERED NASAL 2 TIMES DAILY
Qty: 30 ML | Refills: 0 | Status: SHIPPED | OUTPATIENT
Start: 2022-08-27 | End: 2022-10-13

## 2022-08-27 RX ORDER — FAMOTIDINE 40 MG/1
40 TABLET, FILM COATED ORAL DAILY
Qty: 30 TABLET | Refills: 0 | Status: SHIPPED | OUTPATIENT
Start: 2022-08-27 | End: 2022-10-13

## 2022-08-27 RX ORDER — FLUTICASONE PROPIONATE 50 MCG
1 SPRAY, SUSPENSION (ML) NASAL DAILY
Qty: 16 G | Refills: 0 | Status: SHIPPED | OUTPATIENT
Start: 2022-08-27 | End: 2022-10-13

## 2022-08-27 NOTE — PROGRESS NOTES
"Subjective:       Patient ID: Alexa Otero is a 82 y.o. female.    Vitals:  height is 5' 3" (1.6 m) and weight is 62.1 kg (137 lb). Her oral temperature is 98.3 °F (36.8 °C). Her blood pressure is 170/83 (abnormal) and her pulse is 91. Her respiration is 16 and oxygen saturation is 100%.     Chief Complaint: Ear Fullness    Pt states "she thinks she has fluid in her left ear." Patient says she has had post nasal drip for months since an illness in June. Recently has been feeling as if there is fluid in her left ear. Not taking anything for the symptoms. No significant worsening of symptoms, they just are not resolving.    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;  Service:                Endoscopy;  Laterality: N/A;  No date: HYSTERECTOMY      Comment:  partial  10/19/2020: INSERTION OF TUNNELED CENTRAL VENOUS CATHETER (CVC) WITH   SUBCUTANEOUS PORT; Right      Comment:  Procedure: INSERTION, PORT-A-CATH;  Surgeon: Misti Mcfarland MD;  Location: [facility];  Service: General;                 Laterality: Right;  11/28/2021: INTRAMEDULLARY RODDING OF TROCHANTER OF FEMUR; Right      Comment:  Procedure: INSERTION, INTRAMEDULLARY LUC, FEMUR,                TROCHANTER/RIGHT TFN DR FAJARDO NOTIFIED REP;  Surgeon:                Kp Fajardo MD;  Location: [facility];  " Service:                Orthopedics;  Laterality: Right;  SKIP  9/21/2020: ROBOT-ASSISTED LAPAROSCOPIC LYMPHADENECTOMY USING DA NELLA   XI; N/A      Comment:  Procedure: XI ROBOTIC LYMPHADENECTOMY-pelvic and                kell-aortic;  Surgeon: Altagracia Ray MD;  Location:                [facility];  Service: OB/GYN;  Laterality: N/A;  9/21/2020: ROBOT-ASSISTED LAPAROSCOPIC OMENTECTOMY USING DA NELLA XI;   N/A      Comment:  Procedure: XI ROBOTIC OMENTECTOMY;  Surgeon: Altagracia Ray MD;  Location: [facility];  Service: OB/GYN;                 Laterality: N/A;  9/21/2020: ROBOT-ASSISTED LAPAROSCOPIC SALPINGO-OOPHORECTOMY USING DA   NELLA XI; Bilateral      Comment:  Procedure: XI ROBOTIC SALPINGO-OOPHORECTOMY;  Surgeon:                Altagracia Ray MD;  Location: [facility];  Service:                OB/GYN;  Laterality: Bilateral;    Review of patient's family history indicates:      Social History    Socioeconomic History      Marital status:     Tobacco Use      Smoking status: Former        Packs/day: 1.00        Years: 20.00        Pack years: 20        Types: Cigarettes      Smokeless tobacco: Never      Tobacco comments: quit 40 yrs ago    Substance and Sexual Activity      Alcohol use: Yes        Alcohol/week: 2.0 standard drinks        Types: 2 Glasses of wine per week        Comment: occasional      Drug use: No      Sexual activity: Not Currently        Partners: Male    Social Determinants of Health  Financial Resource Strain: Medium Risk      Difficulty of Paying Living Expenses: Somewhat hard  Food Insecurity: No Food Insecurity      Worried About Running Out of Food in the Last Year: Never true      Ran Out of Food in the Last Year: Never true  Transportation Needs: No Transportation Needs      Lack of Transportation (Medical): No      Lack of Transportation (Non-Medical): No  Physical Activity: Insufficiently Active      Days of Exercise per Week: 2 days      Minutes of  Exercise per Session: 40 min  Stress: No Stress Concern Present      Feeling of Stress : Only a little  Social Connections: Unknown      Frequency of Communication with Friends and Family: More than three times a week      Frequency of Social Gatherings with Friends and Family: Three times a week      Active Member of Clubs or Organizations: No      Attends Club or Organization Meetings: Patient refused      Marital Status:   Housing Stability: Low Risk       Unable to Pay for Housing in the Last Year: No      Number of Places Lived in the Last Year: 1      Unstable Housing in the Last Year: No    Current Outpatient Medications:  benazepriL (LOTENSIN) 40 MG tablet, TAKE 1 TABLET(40 MG) BY MOUTH EVERY DAY, Disp: 90 tablet, Rfl: 3  ergocalciferol (ERGOCALCIFEROL) 50,000 unit Cap, Take 1 capsule (50,000 Units total) by mouth every 7 days., Disp: 12 capsule, Rfl: 1  gabapentin (NEURONTIN) 100 MG capsule, Take 1 capsule (100 mg total) by mouth 2 (two) times daily., Disp: 180 capsule, Rfl: 3  hydroCHLOROthiazide (HYDRODIURIL) 25 MG tablet, TAKE 1 TABLET(25 MG) BY MOUTH EVERY DAY, Disp: 90 tablet, Rfl: 3  meloxicam (MOBIC) 15 MG tablet, TAKE 1 TABLET(15 MG) BY MOUTH EVERY DAY, Disp: 30 tablet, Rfl: 2  metFORMIN (GLUCOPHAGE) 500 MG tablet, TAKE 1 TABLET(500 MG) BY MOUTH TWICE DAILY WITH MEALS, Disp: 180 tablet, Rfl: 3  simvastatin (ZOCOR) 40 MG tablet, TAKE 1 TABLET(40 MG) BY MOUTH EVERY EVENING, Disp: 90 tablet, Rfl: 3  VIT A/VIT C/VIT E/ZINC/COPPER (ICAPS AREDS ORAL), Take 1 capsule by mouth 2 (two) times a day. , Disp: , Rfl:   azelastine (ASTELIN) 137 mcg (0.1 %) nasal spray, 1 spray (137 mcg total) by Nasal route 2 (two) times daily., Disp: 30 mL, Rfl: 0  benzonatate (TESSALON) 200 MG capsule, Take 1 capsule (200 mg total) by mouth 3 (three) times daily as needed for Cough., Disp: 30 capsule, Rfl: 1  famotidine (PEPCID) 40 MG tablet, Take 1 tablet (40 mg total) by mouth once daily at 6am., Disp: 30 tablet, Rfl:  0  fluticasone propionate (FLONASE) 50 mcg/actuation nasal spray, 1 spray (50 mcg total) by Each Nostril route once daily., Disp: 16 g, Rfl: 0  omeprazole (PRILOSEC) 40 MG capsule, TAKE 1 CAPSULE(40 MG) BY MOUTH EVERY DAY (Patient not taking: Reported on 8/27/2022), Disp: 90 capsule, Rfl: 3  tiZANidine (ZANAFLEX) 2 MG tablet, TAKE 2 TABLETS(4 MG) BY MOUTH EVERY 6 HOURS AS NEEDED FOR SPASMS, Disp: 30 tablet, Rfl: 0    No current facility-administered medications for this visit.      Review of patient's allergies indicates:  No Known Allergies     Ear Fullness   There is pain in the left ear. This is a new problem. The current episode started more than 1 month ago. There has been no fever. Pertinent negatives include no coughing, ear discharge, hearing loss or sore throat.     Constitution: Negative. Negative for sweating, fatigue and fever.   HENT:  Positive for postnasal drip. Negative for ear pain, ear discharge, tinnitus, hearing loss, congestion, sinus pain, sinus pressure and sore throat.    Neck: neck negative.   Cardiovascular: Negative.    Respiratory: Negative.  Negative for cough and sputum production.    Gastrointestinal: Negative.    Neurological: Negative.      Objective:      Physical Exam   Constitutional: She is oriented to person, place, and time.  Non-toxic appearance. No distress.   HENT:   Head: Normocephalic and atraumatic.   Ears:   Right Ear: Tympanic membrane normal. impacted cerumen  Left Ear: Tympanic membrane normal. impacted cerumen  Nose: Rhinorrhea present. No congestion.   Mouth/Throat: No posterior oropharyngeal erythema.   Eyes: Pupils are equal, round, and reactive to light.   Cardiovascular: Normal rate and regular rhythm.   No murmur heard.  Pulmonary/Chest: Effort normal and breath sounds normal. No respiratory distress.   Abdominal: Normal appearance and bowel sounds are normal. There is no abdominal tenderness.   Neurological: She is alert and oriented to person, place, and  time.   Psychiatric: Her behavior is normal. Mood normal.       Assessment:       1. Post-nasal drip    2. Ear fullness, left          Plan:       ENT if not resolving, Follow up with PCP if symptoms are not resolving in 48-72 hours, follow up immediately for new or worsening symptoms, ED precautions discussed.    Post-nasal drip  -     fluticasone propionate (FLONASE) 50 mcg/actuation nasal spray; 1 spray (50 mcg total) by Each Nostril route once daily.  Dispense: 16 g; Refill: 0  -     azelastine (ASTELIN) 137 mcg (0.1 %) nasal spray; 1 spray (137 mcg total) by Nasal route 2 (two) times daily.  Dispense: 30 mL; Refill: 0  -     famotidine (PEPCID) 40 MG tablet; Take 1 tablet (40 mg total) by mouth once daily at 6am.  Dispense: 30 tablet; Refill: 0  -     Ambulatory referral/consult to ENT    Ear fullness, left  -     fluticasone propionate (FLONASE) 50 mcg/actuation nasal spray; 1 spray (50 mcg total) by Each Nostril route once daily.  Dispense: 16 g; Refill: 0  -     azelastine (ASTELIN) 137 mcg (0.1 %) nasal spray; 1 spray (137 mcg total) by Nasal route 2 (two) times daily.  Dispense: 30 mL; Refill: 0

## 2022-08-29 ENCOUNTER — TELEPHONE (OUTPATIENT)
Dept: FAMILY MEDICINE | Facility: CLINIC | Age: 82
End: 2022-08-29
Payer: MEDICARE

## 2022-09-01 ENCOUNTER — OFFICE VISIT (OUTPATIENT)
Dept: PAIN MEDICINE | Facility: CLINIC | Age: 82
End: 2022-09-01
Payer: MEDICARE

## 2022-09-01 VITALS
DIASTOLIC BLOOD PRESSURE: 81 MMHG | BODY MASS INDEX: 24.27 KG/M2 | WEIGHT: 137 LBS | HEART RATE: 86 BPM | SYSTOLIC BLOOD PRESSURE: 166 MMHG | HEIGHT: 63 IN

## 2022-09-01 DIAGNOSIS — M47.896 OTHER SPONDYLOSIS, LUMBAR REGION: ICD-10-CM

## 2022-09-01 DIAGNOSIS — S72.141D CLOSED FRACTURE OF FEMUR, INTERTROCHANTERIC, RIGHT, WITH ROUTINE HEALING, SUBSEQUENT ENCOUNTER: ICD-10-CM

## 2022-09-01 DIAGNOSIS — M53.3 SACROILIAC JOINT PAIN: Primary | ICD-10-CM

## 2022-09-01 PROCEDURE — 99999 PR PBB SHADOW E&M-EST. PATIENT-LVL III: ICD-10-PCS | Mod: PBBFAC,,, | Performed by: ANESTHESIOLOGY

## 2022-09-01 PROCEDURE — 1101F PT FALLS ASSESS-DOCD LE1/YR: CPT | Mod: CPTII,S$GLB,, | Performed by: ANESTHESIOLOGY

## 2022-09-01 PROCEDURE — 99999 PR PBB SHADOW E&M-EST. PATIENT-LVL III: CPT | Mod: PBBFAC,,, | Performed by: ANESTHESIOLOGY

## 2022-09-01 PROCEDURE — 1101F PR PT FALLS ASSESS DOC 0-1 FALLS W/OUT INJ PAST YR: ICD-10-PCS | Mod: CPTII,S$GLB,, | Performed by: ANESTHESIOLOGY

## 2022-09-01 PROCEDURE — 1157F PR ADVANCE CARE PLAN OR EQUIV PRESENT IN MEDICAL RECORD: ICD-10-PCS | Mod: CPTII,S$GLB,, | Performed by: ANESTHESIOLOGY

## 2022-09-01 PROCEDURE — 1157F ADVNC CARE PLAN IN RCRD: CPT | Mod: CPTII,S$GLB,, | Performed by: ANESTHESIOLOGY

## 2022-09-01 PROCEDURE — 3079F DIAST BP 80-89 MM HG: CPT | Mod: CPTII,S$GLB,, | Performed by: ANESTHESIOLOGY

## 2022-09-01 PROCEDURE — 3079F PR MOST RECENT DIASTOLIC BLOOD PRESSURE 80-89 MM HG: ICD-10-PCS | Mod: CPTII,S$GLB,, | Performed by: ANESTHESIOLOGY

## 2022-09-01 PROCEDURE — 1159F PR MEDICATION LIST DOCUMENTED IN MEDICAL RECORD: ICD-10-PCS | Mod: CPTII,S$GLB,, | Performed by: ANESTHESIOLOGY

## 2022-09-01 PROCEDURE — 1125F AMNT PAIN NOTED PAIN PRSNT: CPT | Mod: CPTII,S$GLB,, | Performed by: ANESTHESIOLOGY

## 2022-09-01 PROCEDURE — 99204 OFFICE O/P NEW MOD 45 MIN: CPT | Mod: S$GLB,,, | Performed by: ANESTHESIOLOGY

## 2022-09-01 PROCEDURE — 1159F MED LIST DOCD IN RCRD: CPT | Mod: CPTII,S$GLB,, | Performed by: ANESTHESIOLOGY

## 2022-09-01 PROCEDURE — 99204 PR OFFICE/OUTPT VISIT, NEW, LEVL IV, 45-59 MIN: ICD-10-PCS | Mod: S$GLB,,, | Performed by: ANESTHESIOLOGY

## 2022-09-01 PROCEDURE — 3077F SYST BP >= 140 MM HG: CPT | Mod: CPTII,S$GLB,, | Performed by: ANESTHESIOLOGY

## 2022-09-01 PROCEDURE — 3288F PR FALLS RISK ASSESSMENT DOCUMENTED: ICD-10-PCS | Mod: CPTII,S$GLB,, | Performed by: ANESTHESIOLOGY

## 2022-09-01 PROCEDURE — 1125F PR PAIN SEVERITY QUANTIFIED, PAIN PRESENT: ICD-10-PCS | Mod: CPTII,S$GLB,, | Performed by: ANESTHESIOLOGY

## 2022-09-01 PROCEDURE — 3288F FALL RISK ASSESSMENT DOCD: CPT | Mod: CPTII,S$GLB,, | Performed by: ANESTHESIOLOGY

## 2022-09-01 PROCEDURE — 3077F PR MOST RECENT SYSTOLIC BLOOD PRESSURE >= 140 MM HG: ICD-10-PCS | Mod: CPTII,S$GLB,, | Performed by: ANESTHESIOLOGY

## 2022-09-01 NOTE — PROGRESS NOTES
This note was completed with dictation software and grammatical errors may exist.    Referring Physician: Kp Gutierres,*    PCP: Idalia Greco MD      CC: right buttock pain    HPI:   Alexa Otero is a 82 y.o. female referred to us for right buttock pain.  Patient has significant history of ORIF of the right hip November 2021.  She did well postoperatively.  She does have residual right groin pain.  She presents to us with intermittent throbbing, grabbing, tight pain over her right buttock.  Pain radiates down her right leg at times.  Pain worsens with bending, lying, getting up.  Pain improves with rest.  She has undergone physical therapy with mild benefits.  She has been evaluated by orthopedics.  She referred to us for consideration of sacroiliac joint dysfunction.  However, she states her pain is currently tolerable.  Denies any worsening weakness.  No bowel bladder changes.    ROS:  CONSTITUTIONAL: No fevers, chills, night sweats, wt. loss, appetite changes  SKIN: no rashes or itching  ENT: No headaches, head trauma, vision changes, or eye pain  LYMPH NODES: None noticed   CV: No chest pain, palpitations.   RESP: No shortness of breath, dyspnea on exertion, cough, wheezing, or hemoptysis  GI: No nausea, emesis, diarrhea, constipation, melena, hematochezia, pain.    : No dysuria, hematuria, urgency, or frequency   HEME: No easy bruising, bleeding problems  PSYCHIATRIC: No depression, anxiety, psychosis, hallucinations.  NEURO: No seizures, memory loss, dizziness or difficulty sleeping  MSK:  Positive HPI    Past Medical History:   Diagnosis Date    Arthritis     Cataract     Diabetes mellitus type II 2012    Encounter for blood transfusion     Extra-ovarian endometrioid adenocarcinoma 2020    GERD (gastroesophageal reflux disease)     Hyperlipidemia     Hypertension     Macular degeneration     PONV (postoperative nausea and vomiting)     Squamous cell carcinoma 1980's    precancer of cervix      Past Surgical History:   Procedure Laterality Date    AUGMENTATION OF BREAST      CHOLECYSTECTOMY      ESOPHAGOGASTRODUODENOSCOPY N/A 6/20/2018    Procedure: EGD (ESOPHAGOGASTRODUODENOSCOPY);  Surgeon: Sabino Stephenson MD;  Location: [facility];  Service: Endoscopy;  Laterality: N/A;    HYSTERECTOMY      partial    INSERTION OF TUNNELED CENTRAL VENOUS CATHETER (CVC) WITH SUBCUTANEOUS PORT Right 10/19/2020    Procedure: INSERTION, PORT-A-CATH;  Surgeon: Misti Mcfarland MD;  Location: [facility];  Service: General;  Laterality: Right;    INTRAMEDULLARY RODDING OF TROCHANTER OF FEMUR Right 11/28/2021    Procedure: INSERTION, INTRAMEDULLARY LUC, FEMUR, TROCHANTER/RIGHT TFN DR FAJARDO NOTIFIED REP;  Surgeon: Kp Fajardo MD;  Location: [facility];  Service: Orthopedics;  Laterality: Right;  SKIP    ROBOT-ASSISTED LAPAROSCOPIC LYMPHADENECTOMY USING DA NELLA XI N/A 9/21/2020    Procedure: XI ROBOTIC LYMPHADENECTOMY-pelvic and kell-aortic;  Surgeon: Altagracia Ray MD;  Location: [facility];  Service: OB/GYN;  Laterality: N/A;    ROBOT-ASSISTED LAPAROSCOPIC OMENTECTOMY USING DA NELLA XI N/A 9/21/2020    Procedure: XI ROBOTIC OMENTECTOMY;  Surgeon: Altagracia Ray MD;  Location: [facility];  Service: OB/GYN;  Laterality: N/A;    ROBOT-ASSISTED LAPAROSCOPIC SALPINGO-OOPHORECTOMY USING DA NELLA XI Bilateral 9/21/2020    Procedure: XI ROBOTIC SALPINGO-OOPHORECTOMY;  Surgeon: Altagracia Ray MD;  Location: [facility];  Service: OB/GYN;  Laterality: Bilateral;     Family History   Problem Relation Age of Onset    Cancer Mother 57        lung    Cancer Father 56        oral    Skin cancer Sister     Breast cancer Maternal Grandmother     Breast cancer Paternal Grandmother     Skin cancer Brother     Melanoma Brother     Skin cancer Brother     Psoriasis Neg Hx     Lupus Neg Hx     Eczema Neg Hx      Social History     Socioeconomic History    Marital status:    Tobacco Use    Smoking status: Former     Packs/day:  "1.00     Years: 20.00     Pack years: 20.00     Types: Cigarettes    Smokeless tobacco: Never    Tobacco comments:     quit 40 yrs ago   Substance and Sexual Activity    Alcohol use: Yes     Alcohol/week: 2.0 standard drinks     Types: 2 Glasses of wine per week     Comment: occasional    Drug use: No    Sexual activity: Not Currently     Partners: Male     Social Determinants of Health     Financial Resource Strain: Medium Risk    Difficulty of Paying Living Expenses: Somewhat hard   Food Insecurity: No Food Insecurity    Worried About Running Out of Food in the Last Year: Never true    Ran Out of Food in the Last Year: Never true   Transportation Needs: No Transportation Needs    Lack of Transportation (Medical): No    Lack of Transportation (Non-Medical): No   Physical Activity: Insufficiently Active    Days of Exercise per Week: 2 days    Minutes of Exercise per Session: 40 min   Stress: No Stress Concern Present    Feeling of Stress : Only a little   Social Connections: Unknown    Frequency of Communication with Friends and Family: More than three times a week    Frequency of Social Gatherings with Friends and Family: Three times a week    Active Member of Clubs or Organizations: No    Attends Club or Organization Meetings: Patient refused    Marital Status:    Housing Stability: Low Risk     Unable to Pay for Housing in the Last Year: No    Number of Places Lived in the Last Year: 1    Unstable Housing in the Last Year: No         Medications/Allergies: See med card    Vitals:    09/01/22 0845   BP: (!) 166/81   Pulse: 86   Weight: 62.1 kg (137 lb)   Height: 5' 3" (1.6 m)   PainSc:   2   PainLoc: Back         Physical exam:    GENERAL: A and O x3, the patient appears well groomed and is in no acute distress.  Skin: No rashes or obvious lesions  HEENT: normocephalic, atraumatic  CARDIOVASCULAR:  Palpable peripheral pulses  LUNGS: easy work of breathing  ABDOMEN: soft, nontender   UPPER EXTREMITIES: " Normal alignment, normal range of motion, no atrophy, no skin changes,  hair growth and nail growth normal and equal bilaterally. No swelling, no tenderness.    LOWER EXTREMITIES:  Normal alignment, normal range of motion, no atrophy, no skin changes,  hair growth and nail growth normal and equal bilaterally. No swelling, no tenderness.  LUMBAR SPINE  Lumbar spine: ROM is limited with flexion extension and oblique extension with mild increased pain.    Byron's test causes increased pain on right side.    Supine straight leg raise is negative bilaterally.    Internal and external rotation of the hip causes no increased pain on either side.  Myofascial exam: No tenderness to palpation across lumbar paraspinous muscles.      MENTAL STATUS: normal orientation, speech, language, and fund of knowledge for social situation.  Emotional state appropriate.    MOTOR: Tone and bulk: normal bilateral upper and lower Strength: normal     SENSATION: Light touch and pinprick intact bilaterally  REFLEXES: normal, symmetric, nonbrisk.  Toes down, no clonus. No hoffmans.  GAIT: antalgic gait       Imaging:  Xray L-spine 2016  A dextrocurvature to the lumbar spine is noted.  Retrolisthesis is noted of 5 mm of the L2 and L3 vertebral body.       Intervertebral disk height loss is noted at the L1-L2 L2-L3 L3-L4 levels.       Facet arthropathy is noted at the L3-L4 L4-L5 and L5-S1 levels.    Xray Right Hip 3/2022  The patient has undergone open reduction internal fixation of a right hip fracture with a short intramedullary nail and a screw pin through the nail into the femoral head with excellent apposition and normal angulation.  Fracture lines are not presently seen.  The bones of the pelvis and the left hip are intact    Assessment:  Patient presents with right buttock pain  1. Sacroiliac joint pain    2. Other spondylosis, lumbar region    3. Closed fracture of femur, intertrochanteric, right, with routine healing, subsequent  encounter          Plan:  I have stressed the importance of physical activity and exercise to improve overall health  Reviewed pertinent imaging and records with patient  I do suspect component of right-sided sacroiliac joint pain.  However, patient states pain is currently tolerable.  May consider sacroiliac joint injection under fluoroscopy in the future if pain worsens.  She will follow-up as needed.    Thank you for referring this interesting patient, and I look forward to continuing to collaborate in her care.

## 2022-09-19 ENCOUNTER — INFUSION (OUTPATIENT)
Dept: INFUSION THERAPY | Facility: HOSPITAL | Age: 82
End: 2022-09-19
Attending: OBSTETRICS & GYNECOLOGY
Payer: MEDICARE

## 2022-09-19 VITALS
DIASTOLIC BLOOD PRESSURE: 89 MMHG | OXYGEN SATURATION: 99 % | SYSTOLIC BLOOD PRESSURE: 163 MMHG | TEMPERATURE: 98 F | HEART RATE: 94 BPM | RESPIRATION RATE: 18 BRPM

## 2022-09-19 DIAGNOSIS — Z51.11 MAINTENANCE CHEMOTHERAPY: Primary | ICD-10-CM

## 2022-09-19 DIAGNOSIS — C56.1 CARCINOMA OF RIGHT OVARY: ICD-10-CM

## 2022-09-19 PROCEDURE — 25000003 PHARM REV CODE 250: Performed by: OBSTETRICS & GYNECOLOGY

## 2022-09-19 PROCEDURE — A4216 STERILE WATER/SALINE, 10 ML: HCPCS | Performed by: OBSTETRICS & GYNECOLOGY

## 2022-09-19 PROCEDURE — 96523 IRRIG DRUG DELIVERY DEVICE: CPT

## 2022-09-19 PROCEDURE — 63600175 PHARM REV CODE 636 W HCPCS: Performed by: OBSTETRICS & GYNECOLOGY

## 2022-09-19 RX ORDER — HEPARIN 100 UNIT/ML
500 SYRINGE INTRAVENOUS
Status: DISCONTINUED | OUTPATIENT
Start: 2022-09-19 | End: 2022-09-19 | Stop reason: HOSPADM

## 2022-09-19 RX ORDER — HEPARIN 100 UNIT/ML
500 SYRINGE INTRAVENOUS
Status: CANCELLED | OUTPATIENT
Start: 2022-09-19

## 2022-09-19 RX ORDER — SODIUM CHLORIDE 0.9 % (FLUSH) 0.9 %
10 SYRINGE (ML) INJECTION
Status: DISCONTINUED | OUTPATIENT
Start: 2022-09-19 | End: 2022-09-19 | Stop reason: HOSPADM

## 2022-09-19 RX ORDER — SODIUM CHLORIDE 0.9 % (FLUSH) 0.9 %
10 SYRINGE (ML) INJECTION
Status: CANCELLED | OUTPATIENT
Start: 2022-09-19

## 2022-09-19 RX ADMIN — SODIUM CHLORIDE, PRESERVATIVE FREE 10 ML: 5 INJECTION INTRAVENOUS at 10:09

## 2022-09-19 RX ADMIN — HEPARIN 500 UNITS: 100 SYRINGE at 10:09

## 2022-09-29 ENCOUNTER — OFFICE VISIT (OUTPATIENT)
Dept: DERMATOLOGY | Facility: CLINIC | Age: 82
End: 2022-09-29
Payer: MEDICARE

## 2022-09-29 VITALS — BODY MASS INDEX: 24.27 KG/M2 | HEIGHT: 63 IN | WEIGHT: 137 LBS

## 2022-09-29 DIAGNOSIS — L82.1 SEBORRHEIC KERATOSES: ICD-10-CM

## 2022-09-29 DIAGNOSIS — Z85.828 HISTORY OF NONMELANOMA SKIN CANCER: ICD-10-CM

## 2022-09-29 DIAGNOSIS — L72.0 MILIA: ICD-10-CM

## 2022-09-29 DIAGNOSIS — L57.0 ACTINIC KERATOSES: Primary | ICD-10-CM

## 2022-09-29 DIAGNOSIS — D22.9 MULTIPLE BENIGN NEVI: ICD-10-CM

## 2022-09-29 DIAGNOSIS — D18.01 CHERRY ANGIOMA: ICD-10-CM

## 2022-09-29 PROCEDURE — 3288F PR FALLS RISK ASSESSMENT DOCUMENTED: ICD-10-PCS | Mod: CPTII,S$GLB,, | Performed by: DERMATOLOGY

## 2022-09-29 PROCEDURE — 1159F PR MEDICATION LIST DOCUMENTED IN MEDICAL RECORD: ICD-10-PCS | Mod: CPTII,S$GLB,, | Performed by: DERMATOLOGY

## 2022-09-29 PROCEDURE — 17000 PR DESTRUCTION(LASER SURGERY,CRYOSURGERY,CHEMOSURGERY),PREMALIGNANT LESIONS,FIRST LESION: ICD-10-PCS | Mod: S$GLB,,, | Performed by: DERMATOLOGY

## 2022-09-29 PROCEDURE — 17000 DESTRUCT PREMALG LESION: CPT | Mod: S$GLB,,, | Performed by: DERMATOLOGY

## 2022-09-29 PROCEDURE — 1126F PR PAIN SEVERITY QUANTIFIED, NO PAIN PRESENT: ICD-10-PCS | Mod: CPTII,S$GLB,, | Performed by: DERMATOLOGY

## 2022-09-29 PROCEDURE — 1100F PR PT FALLS ASSESS DOC 2+ FALLS/FALL W/INJURY/YR: ICD-10-PCS | Mod: CPTII,S$GLB,, | Performed by: DERMATOLOGY

## 2022-09-29 PROCEDURE — 1157F PR ADVANCE CARE PLAN OR EQUIV PRESENT IN MEDICAL RECORD: ICD-10-PCS | Mod: CPTII,S$GLB,, | Performed by: DERMATOLOGY

## 2022-09-29 PROCEDURE — 99213 PR OFFICE/OUTPT VISIT, EST, LEVL III, 20-29 MIN: ICD-10-PCS | Mod: 25,S$GLB,, | Performed by: DERMATOLOGY

## 2022-09-29 PROCEDURE — 1159F MED LIST DOCD IN RCRD: CPT | Mod: CPTII,S$GLB,, | Performed by: DERMATOLOGY

## 2022-09-29 PROCEDURE — 17003 DESTRUCT PREMALG LES 2-14: CPT | Mod: S$GLB,,, | Performed by: DERMATOLOGY

## 2022-09-29 PROCEDURE — 99213 OFFICE O/P EST LOW 20 MIN: CPT | Mod: 25,S$GLB,, | Performed by: DERMATOLOGY

## 2022-09-29 PROCEDURE — 1160F PR REVIEW ALL MEDS BY PRESCRIBER/CLIN PHARMACIST DOCUMENTED: ICD-10-PCS | Mod: CPTII,S$GLB,, | Performed by: DERMATOLOGY

## 2022-09-29 PROCEDURE — 99999 PR PBB SHADOW E&M-EST. PATIENT-LVL III: ICD-10-PCS | Mod: PBBFAC,,, | Performed by: DERMATOLOGY

## 2022-09-29 PROCEDURE — 1100F PTFALLS ASSESS-DOCD GE2>/YR: CPT | Mod: CPTII,S$GLB,, | Performed by: DERMATOLOGY

## 2022-09-29 PROCEDURE — 17003 DESTRUCTION, PREMALIGNANT LESIONS; SECOND THROUGH 14 LESIONS: ICD-10-PCS | Mod: S$GLB,,, | Performed by: DERMATOLOGY

## 2022-09-29 PROCEDURE — 3288F FALL RISK ASSESSMENT DOCD: CPT | Mod: CPTII,S$GLB,, | Performed by: DERMATOLOGY

## 2022-09-29 PROCEDURE — 1126F AMNT PAIN NOTED NONE PRSNT: CPT | Mod: CPTII,S$GLB,, | Performed by: DERMATOLOGY

## 2022-09-29 PROCEDURE — 1157F ADVNC CARE PLAN IN RCRD: CPT | Mod: CPTII,S$GLB,, | Performed by: DERMATOLOGY

## 2022-09-29 PROCEDURE — 1160F RVW MEDS BY RX/DR IN RCRD: CPT | Mod: CPTII,S$GLB,, | Performed by: DERMATOLOGY

## 2022-09-29 PROCEDURE — 99999 PR PBB SHADOW E&M-EST. PATIENT-LVL III: CPT | Mod: PBBFAC,,, | Performed by: DERMATOLOGY

## 2022-09-29 NOTE — PATIENT INSTRUCTIONS

## 2022-09-29 NOTE — PROGRESS NOTES
Subjective:       Patient ID:  Alexa Otero is a 82 y.o. female who presents for   Chief Complaint   Patient presents with    Skin Check     TBSC     LOV 4/9/21-SK    Patient here today for TBSC.  No major areas of concern    Derm HX:  H/o SCC groin, 80s  Brother - MM and SCC   Brother 2 - SCC    Recovered from ovarian cancer (stage 1), finished chemo treatment (Feb 2021). Dr Funes ([facility]) did ovary removal late 2020    Current Outpatient Medications:   ·  azelastine (ASTELIN) 137 mcg (0.1 %) nasal spray, 1 spray (137 mcg total) by Nasal route 2 (two) times daily., Disp: 30 mL, Rfl: 0  ·  benazepriL (LOTENSIN) 40 MG tablet, TAKE 1 TABLET(40 MG) BY MOUTH EVERY DAY, Disp: 90 tablet, Rfl: 3  ·  ergocalciferol (ERGOCALCIFEROL) 50,000 unit Cap, Take 1 capsule (50,000 Units total) by mouth every 7 days., Disp: 12 capsule, Rfl: 1  ·  fluticasone propionate (FLONASE) 50 mcg/actuation nasal spray, 1 spray (50 mcg total) by Each Nostril route once daily., Disp: 16 g, Rfl: 0  ·  gabapentin (NEURONTIN) 100 MG capsule, Take 1 capsule (100 mg total) by mouth 2 (two) times daily., Disp: 180 capsule, Rfl: 3  ·  hydroCHLOROthiazide (HYDRODIURIL) 25 MG tablet, TAKE 1 TABLET(25 MG) BY MOUTH EVERY DAY, Disp: 90 tablet, Rfl: 3  ·  meloxicam (MOBIC) 15 MG tablet, TAKE 1 TABLET(15 MG) BY MOUTH EVERY DAY, Disp: 30 tablet, Rfl: 2  ·  metFORMIN (GLUCOPHAGE) 500 MG tablet, TAKE 1 TABLET(500 MG) BY MOUTH TWICE DAILY WITH MEALS, Disp: 180 tablet, Rfl: 3  ·  omeprazole (PRILOSEC) 40 MG capsule, TAKE 1 CAPSULE(40 MG) BY MOUTH EVERY DAY, Disp: 90 capsule, Rfl: 3  ·  simvastatin (ZOCOR) 40 MG tablet, TAKE 1 TABLET(40 MG) BY MOUTH EVERY EVENING, Disp: 90 tablet, Rfl: 3  ·  VIT A/VIT C/VIT E/ZINC/COPPER (ICAPS AREDS ORAL), Take 1 capsule by mouth 2 (two) times a day. , Disp: , Rfl:   ·  benzonatate (TESSALON) 200 MG capsule, Take 1 capsule (200 mg total) by mouth 3 (three) times daily as needed for Cough., Disp: 30 capsule, Rfl: 1  ·   famotidine (PEPCID) 40 MG tablet, Take 1 tablet (40 mg total) by mouth once daily at 6am., Disp: 30 tablet, Rfl: 0  ·  tiZANidine (ZANAFLEX) 2 MG tablet, TAKE 2 TABLETS(4 MG) BY MOUTH EVERY 6 HOURS AS NEEDED FOR SPASMS, Disp: 30 tablet, Rfl: 0          Review of Systems   Constitutional:  Negative for fever, chills, weight loss, weight gain, fatigue, night sweats and malaise.   Skin:  Positive for daily sunscreen use and activity-related sunscreen use. Negative for itching, rash, dry skin and wears hat.   Hematologic/Lymphatic: Does not bruise/bleed easily.      Objective:    Physical Exam   Constitutional: She appears well-developed and well-nourished. No distress.   Neurological: She is alert and oriented to person, place, and time. She is not disoriented.   Psychiatric: She has a normal mood and affect.   Skin:   Areas Examined (abnormalities noted in diagram):   Scalp / Hair Palpated and Inspected  Head / Face Inspection Performed  Neck Inspection Performed  Chest / Axilla Inspection Performed  Abdomen Inspection Performed  Genitals / Buttocks / Groin Inspection Performed  Back Inspection Performed  RUE Inspected  LUE Inspection Performed  RLE Inspected  LLE Inspection Performed  Nails and Digits Inspection Performed                     Diagram Legend     Erythematous scaling macule/papule c/w actinic keratosis       Vascular papule c/w angioma      Pigmented verrucoid papule/plaque c/w seborrheic keratosis      Yellow umbilicated papule c/w sebaceous hyperplasia      Irregularly shaped tan macule c/w lentigo     1-2 mm smooth white papules consistent with Milia      Movable subcutaneous cyst with punctum c/w epidermal inclusion cyst      Subcutaneous movable cyst c/w pilar cyst      Firm pink to brown papule c/w dermatofibroma      Pedunculated fleshy papule(s) c/w skin tag(s)      Evenly pigmented macule c/w junctional nevus     Mildly variegated pigmented, slightly irregular-bordered macule c/w mildly  atypical nevus      Flesh colored to evenly pigmented papule c/w intradermal nevus       Pink pearly papule/plaque c/w basal cell carcinoma      Erythematous hyperkeratotic cursted plaque c/w SCC      Surgical scar with no sign of skin cancer recurrence      Open and closed comedones      Inflammatory papules and pustules      Verrucoid papule consistent consistent with wart     Erythematous eczematous patches and plaques     Dystrophic onycholytic nail with subungual debris c/w onychomycosis     Umbilicated papule    Erythematous-base heme-crusted tan verrucoid plaque consistent with inflamed seborrheic keratosis     Erythematous Silvery Scaling Plaque c/w Psoriasis     See annotation      Assessment / Plan:        Actinic keratoses  Cryosurgery Procedure Note    Verbal consent from the patient is obtained and the patient is aware of the precancerous quality and need for treatment of these lesions. Liquid nitrogen cryosurgery is applied to the 3 actinic keratoses, as detailed in the physical exam, to produce a freeze injury. The patient is aware that blisters may form and is instructed on wound care with gentle cleansing and use of vaseline ointment to keep moist until healed. The patient is supplied a handout on cryosurgery and is instructed to call if lesions do not completely resolve.    Milia  Reassurance given to patient. No treatment is necessary.   Treatment of benign, asymptomatic lesions may be considered cosmetic.    Seborrheic keratoses  These are benign inherited growths without a malignant potential. Reassurance given to patient. No treatment is necessary.     History of nonmelanoma skin cancer  Area of previous SCC  examined. Site well healed with no signs of recurrence.    Total body skin examination performed today including at least 12 points as noted in physical examination. No lesions suspicious for malignancy noted.    Multiple benign nevi  Careful dermoscopy evaluation of nevi performed with  none identified as needing biopsy today  Monitor for new mole or moles that are becoming bigger, darker, irritated, or developing irregular borders.     Cherry angioma  This is a benign vascular lesion. Reassurance given. No treatment required.     Patient instructed in importance in daily broad spectrum sun protection of at least spf 30. Mineral sunscreen ingredients preferred (Zinc +/- Titanium) and can be found OTC.   Recommend Elta MD for daily use on face and neck.  Patient encouraged to wear hat for all outdoor exposure.   Also discussed sun avoidance and use of protective clothing.             Follow up in about 1 year (around 9/29/2023), or if symptoms worsen or fail to improve.

## 2022-10-12 ENCOUNTER — PATIENT MESSAGE (OUTPATIENT)
Dept: FAMILY MEDICINE | Facility: CLINIC | Age: 82
End: 2022-10-12
Payer: MEDICARE

## 2022-10-13 ENCOUNTER — E-VISIT (OUTPATIENT)
Dept: FAMILY MEDICINE | Facility: CLINIC | Age: 82
End: 2022-10-13
Payer: MEDICARE

## 2022-10-13 DIAGNOSIS — R30.0 DYSURIA: Primary | ICD-10-CM

## 2022-10-13 PROCEDURE — 99422 OL DIG E/M SVC 11-20 MIN: CPT | Mod: S$GLB,,, | Performed by: NURSE PRACTITIONER

## 2022-10-13 PROCEDURE — 99422 PR E&M, ONLINE DIGIT, EST, < 7 DAYS,  11-20 MINS: ICD-10-PCS | Mod: S$GLB,,, | Performed by: NURSE PRACTITIONER

## 2022-10-13 RX ORDER — CIPROFLOXACIN 500 MG/1
500 TABLET ORAL EVERY 12 HOURS
Qty: 14 TABLET | Refills: 0 | Status: SHIPPED | OUTPATIENT
Start: 2022-10-13 | End: 2022-10-20

## 2022-10-13 NOTE — PROGRESS NOTES
Patient ID: Alexa Otero is a 82 y.o. female.    Chief Complaint: No chief complaint on file.    The patient initiated a request through [employer] on 10/13/2022 for evaluation and management with a chief complaint of No chief complaint on file.     I evaluated the questionnaire submission on E-visit.    [facility] Peq Evisit Uti Questionnaire    10/13/2022  3:44 PM CDT - Filed by Patient   Do you agree to participate in an E-Visit? Yes   If you have any of the following problems, please present to your local ER or call 911:  I acknowledge   What is the main issue that you would like for your doctor to address today? Urinary issue   Are you able to take your vital signs? No   What symptoms do you currently have? Difficulty passing urine   When did your symptoms first appear? 10/10/2022   List what you have done or taken to help your symptoms. Azo   Please indicate whether you have had the following symptoms during the past 24 hours     Urgent urination (a sudden and uncontrollable urge to urinate) Moderate   Frequent urination of small amounts of urine (going to the toilet very often) Moderate   Burning pain when urinating Mild   Incomplete bladder emptying (still feel like you need to urinate again after urination) Mild   Pain not associated with urination in the lower abdomen below the belly button) None   What does your urine look like? Cloudy   Blood seen in the urine (without menstrual cycle) None   Flank pain (pain in one or both sides of the lower back) None   Abnormal Vaginal Discharge (abnormal amount, color and/or odor) None   Discharge from the urethra (urinary opening) without urination None   High body temperature/fever? None-<99.5   Please rate how much discomfort you have experience because of the symptoms in the past 24 hours: Mild   Please indicate how the symptoms have interfered with your every day activities/work in the past 24 hours: None   Please indicate how these symptoms have interfered with your  social activities (visiting people, meeting with friends, etc.) in the past 24 hours? None   Are you a diabetic? Yes   If you are female, please indicate whether you have the following at the time of completion of this questionnaire: Signs of menopause syndrome (hot flashes)   Is there a chance you could be pregnant? 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    Primary osteoarthritis of right ankle 04/20/2018    Gastroesophageal reflux disease 06/24/2016    History of Kwon's esophagus 06/24/2016    Pancreatic pseudocyst/cyst 06/24/2016    Hepatic cyst 06/24/2016    Low back pain radiating to both legs 06/24/2016    Vitamin D deficiency disease 04/22/2015    HTN (hypertension) 08/27/2014    Acute chest pain 08/27/2014    Hyperlipemia 05/01/2013    Diabetes mellitus type 2, controlled 05/01/2013    Sciatica 04/11/2013    Syncope and collapse 04/11/2013    Polycythemia, secondary 04/11/2013      Recent Labs Obtained:  No visits with results within 7 Day(s) from this visit.   Latest known visit with results is:   Infusion on 08/08/2022   Component Date Value Ref Range Status     08/08/2022 8.1  0.0 - 38.1 U/mL Final    Comment: [employer]  Electrochemiluminescence  Immunoassay (ECLIA)  Values obtained with different assay methods  or kits cannot be used interchangeably.  Results cannot be interpreted as absolute  evidence of the presence or absence of  malignant disease.  Performed at:  [address]  [id number]  : Rajendra De Los Santos MD, Phone:  [phone]         Encounter Diagnosis   Name Primary?    Dysuria Yes        Orders Placed This Encounter   Procedures    Urine culture     Standing Status:   Future     Standing Expiration Date:   12/12/2023    Urinalysis     Standing Status:   Future     Standing Expiration Date:   12/12/2023      Medications Ordered This Encounter   Medications    ciprofloxacin HCl (CIPRO) 500 MG tablet     Sig: Take 1 tablet (500 mg total) by mouth every 12 (twelve) hours. for 7 days     Dispense:  14 tablet     Refill:  0        E-Visit Time Tracking:    Day 1 Time (in minutes): 15     Total Time (in minutes): 15

## 2022-10-14 ENCOUNTER — PATIENT MESSAGE (OUTPATIENT)
Dept: FAMILY MEDICINE | Facility: CLINIC | Age: 82
End: 2022-10-14
Payer: MEDICARE

## 2022-10-31 ENCOUNTER — INFUSION (OUTPATIENT)
Dept: INFUSION THERAPY | Facility: HOSPITAL | Age: 82
End: 2022-10-31
Attending: OBSTETRICS & GYNECOLOGY
Payer: MEDICARE

## 2022-10-31 VITALS
SYSTOLIC BLOOD PRESSURE: 146 MMHG | RESPIRATION RATE: 16 BRPM | HEART RATE: 99 BPM | OXYGEN SATURATION: 98 % | WEIGHT: 129.5 LBS | TEMPERATURE: 98 F | BODY MASS INDEX: 22.95 KG/M2 | DIASTOLIC BLOOD PRESSURE: 80 MMHG | HEIGHT: 63 IN

## 2022-10-31 DIAGNOSIS — Z51.11 MAINTENANCE CHEMOTHERAPY: Primary | ICD-10-CM

## 2022-10-31 DIAGNOSIS — C56.1 CARCINOMA OF RIGHT OVARY: ICD-10-CM

## 2022-10-31 PROCEDURE — 86304 IMMUNOASSAY TUMOR CA 125: CPT | Performed by: OBSTETRICS & GYNECOLOGY

## 2022-10-31 PROCEDURE — A4216 STERILE WATER/SALINE, 10 ML: HCPCS | Performed by: OBSTETRICS & GYNECOLOGY

## 2022-10-31 PROCEDURE — 36591 DRAW BLOOD OFF VENOUS DEVICE: CPT

## 2022-10-31 PROCEDURE — 25000003 PHARM REV CODE 250: Performed by: OBSTETRICS & GYNECOLOGY

## 2022-10-31 PROCEDURE — 63600175 PHARM REV CODE 636 W HCPCS: Performed by: OBSTETRICS & GYNECOLOGY

## 2022-10-31 RX ORDER — SODIUM CHLORIDE 0.9 % (FLUSH) 0.9 %
10 SYRINGE (ML) INJECTION
Status: CANCELLED | OUTPATIENT
Start: 2022-10-31

## 2022-10-31 RX ORDER — SODIUM CHLORIDE 0.9 % (FLUSH) 0.9 %
10 SYRINGE (ML) INJECTION
Status: DISCONTINUED | OUTPATIENT
Start: 2022-10-31 | End: 2022-10-31 | Stop reason: HOSPADM

## 2022-10-31 RX ORDER — HEPARIN 100 UNIT/ML
500 SYRINGE INTRAVENOUS
Status: DISCONTINUED | OUTPATIENT
Start: 2022-10-31 | End: 2022-10-31 | Stop reason: HOSPADM

## 2022-10-31 RX ORDER — HEPARIN 100 UNIT/ML
500 SYRINGE INTRAVENOUS
Status: CANCELLED | OUTPATIENT
Start: 2022-10-31

## 2022-10-31 RX ADMIN — HEPARIN 500 UNITS: 100 SYRINGE at 10:10

## 2022-10-31 RX ADMIN — SODIUM CHLORIDE, PRESERVATIVE FREE 10 ML: 5 INJECTION INTRAVENOUS at 10:10

## 2022-11-01 LAB — CANCER AG125 SERPL-ACNC: 9.8 U/ML (ref 0–38.1)

## 2022-11-04 ENCOUNTER — LAB VISIT (OUTPATIENT)
Dept: LAB | Facility: HOSPITAL | Age: 82
End: 2022-11-04
Attending: FAMILY MEDICINE
Payer: MEDICARE

## 2022-11-04 DIAGNOSIS — E11.9 CONTROLLED TYPE 2 DIABETES MELLITUS WITHOUT COMPLICATION, WITHOUT LONG-TERM CURRENT USE OF INSULIN: ICD-10-CM

## 2022-11-04 DIAGNOSIS — E55.9 VITAMIN D DEFICIENCY DISEASE: ICD-10-CM

## 2022-11-04 LAB
25(OH)D3+25(OH)D2 SERPL-MCNC: 46 NG/ML (ref 30–96)
ALBUMIN SERPL BCP-MCNC: 4 G/DL (ref 3.5–5.2)
ALP SERPL-CCNC: 52 U/L (ref 55–135)
ALT SERPL W/O P-5'-P-CCNC: 10 U/L (ref 10–44)
ANION GAP SERPL CALC-SCNC: 12 MMOL/L (ref 8–16)
AST SERPL-CCNC: 15 U/L (ref 10–40)
BILIRUB SERPL-MCNC: 0.4 MG/DL (ref 0.1–1)
BUN SERPL-MCNC: 30 MG/DL (ref 8–23)
CALCIUM SERPL-MCNC: 10 MG/DL (ref 8.7–10.5)
CHLORIDE SERPL-SCNC: 106 MMOL/L (ref 95–110)
CO2 SERPL-SCNC: 25 MMOL/L (ref 23–29)
CREAT SERPL-MCNC: 1.1 MG/DL (ref 0.5–1.4)
EST. GFR  (NO RACE VARIABLE): 50.5 ML/MIN/1.73 M^2
ESTIMATED AVG GLUCOSE: 117 MG/DL (ref 68–131)
GLUCOSE SERPL-MCNC: 102 MG/DL (ref 70–110)
HBA1C MFR BLD: 5.7 % (ref 4–5.6)
POTASSIUM SERPL-SCNC: 4 MMOL/L (ref 3.5–5.1)
PROT SERPL-MCNC: 7.1 G/DL (ref 6–8.4)
SODIUM SERPL-SCNC: 143 MMOL/L (ref 136–145)

## 2022-11-04 PROCEDURE — 83036 HEMOGLOBIN GLYCOSYLATED A1C: CPT | Performed by: FAMILY MEDICINE

## 2022-11-04 PROCEDURE — 82306 VITAMIN D 25 HYDROXY: CPT | Performed by: FAMILY MEDICINE

## 2022-11-04 PROCEDURE — 80053 COMPREHEN METABOLIC PANEL: CPT | Performed by: FAMILY MEDICINE

## 2022-11-04 PROCEDURE — 36415 COLL VENOUS BLD VENIPUNCTURE: CPT | Mod: PO | Performed by: FAMILY MEDICINE

## 2022-11-11 ENCOUNTER — TELEPHONE (OUTPATIENT)
Dept: FAMILY MEDICINE | Facility: CLINIC | Age: 82
End: 2022-11-11
Payer: MEDICARE

## 2022-11-11 NOTE — TELEPHONE ENCOUNTER
----- Message from Floresita Murphy sent at 11/11/2022  9:35 AM CST -----  Type: Patient Call Back         Who called:Pt          What is the request in detail: Pt called in regarding the appointment that was reschedule for 11/14/22 . Pt is ok with the Date but wanting to know if possible she could come in at 11am ? Due to another appointment on that day for her Brother          Can the clinic reply by [facility]? No          Would the patient rather a call back or a response via [facility]? Call back          Best call back number:[phone] (mobile)          Additional Information:           Thank You

## 2022-11-14 ENCOUNTER — HOSPITAL ENCOUNTER (OUTPATIENT)
Dept: RADIOLOGY | Facility: CLINIC | Age: 82
Discharge: HOME | End: 2022-11-14
Attending: FAMILY MEDICINE
Payer: MEDICARE

## 2022-11-14 ENCOUNTER — OFFICE VISIT (OUTPATIENT)
Dept: FAMILY MEDICINE | Facility: CLINIC | Age: 82
End: 2022-11-14
Payer: MEDICARE

## 2022-11-14 VITALS
BODY MASS INDEX: 23.32 KG/M2 | WEIGHT: 131.63 LBS | SYSTOLIC BLOOD PRESSURE: 150 MMHG | DIASTOLIC BLOOD PRESSURE: 78 MMHG | TEMPERATURE: 98 F | OXYGEN SATURATION: 97 % | RESPIRATION RATE: 14 BRPM | HEIGHT: 63 IN | HEART RATE: 85 BPM

## 2022-11-14 DIAGNOSIS — B96.89 ACUTE BACTERIAL BRONCHITIS: ICD-10-CM

## 2022-11-14 DIAGNOSIS — E78.5 HYPERLIPIDEMIA, UNSPECIFIED HYPERLIPIDEMIA TYPE: Primary | ICD-10-CM

## 2022-11-14 DIAGNOSIS — R05.3 CHRONIC COUGH: ICD-10-CM

## 2022-11-14 DIAGNOSIS — I10 PRIMARY HYPERTENSION: ICD-10-CM

## 2022-11-14 DIAGNOSIS — E11.9 CONTROLLED TYPE 2 DIABETES MELLITUS WITHOUT COMPLICATION, WITHOUT LONG-TERM CURRENT USE OF INSULIN: ICD-10-CM

## 2022-11-14 DIAGNOSIS — C56.1 CARCINOMA OF RIGHT OVARY: ICD-10-CM

## 2022-11-14 DIAGNOSIS — J20.8 ACUTE BACTERIAL BRONCHITIS: ICD-10-CM

## 2022-11-14 DIAGNOSIS — M25.551 RIGHT HIP PAIN: ICD-10-CM

## 2022-11-14 DIAGNOSIS — E55.9 VITAMIN D DEFICIENCY DISEASE: ICD-10-CM

## 2022-11-14 DIAGNOSIS — K21.9 GASTROESOPHAGEAL REFLUX DISEASE, UNSPECIFIED WHETHER ESOPHAGITIS PRESENT: ICD-10-CM

## 2022-11-14 DIAGNOSIS — D75.1 POLYCYTHEMIA, SECONDARY: ICD-10-CM

## 2022-11-14 PROCEDURE — 1101F PT FALLS ASSESS-DOCD LE1/YR: CPT | Mod: CPTII,S$GLB,, | Performed by: FAMILY MEDICINE

## 2022-11-14 PROCEDURE — 3078F PR MOST RECENT DIASTOLIC BLOOD PRESSURE < 80 MM HG: ICD-10-PCS | Mod: CPTII,S$GLB,, | Performed by: FAMILY MEDICINE

## 2022-11-14 PROCEDURE — 3077F SYST BP >= 140 MM HG: CPT | Mod: CPTII,S$GLB,, | Performed by: FAMILY MEDICINE

## 2022-11-14 PROCEDURE — 99999 PR PBB SHADOW E&M-EST. PATIENT-LVL III: ICD-10-PCS | Mod: PBBFAC,,, | Performed by: FAMILY MEDICINE

## 2022-11-14 PROCEDURE — 99499 UNLISTED E&M SERVICE: CPT | Mod: S$GLB,,, | Performed by: FAMILY MEDICINE

## 2022-11-14 PROCEDURE — 3078F DIAST BP <80 MM HG: CPT | Mod: CPTII,S$GLB,, | Performed by: FAMILY MEDICINE

## 2022-11-14 PROCEDURE — 1160F PR REVIEW ALL MEDS BY PRESCRIBER/CLIN PHARMACIST DOCUMENTED: ICD-10-PCS | Mod: CPTII,S$GLB,, | Performed by: FAMILY MEDICINE

## 2022-11-14 PROCEDURE — 1159F PR MEDICATION LIST DOCUMENTED IN MEDICAL RECORD: ICD-10-PCS | Mod: CPTII,S$GLB,, | Performed by: FAMILY MEDICINE

## 2022-11-14 PROCEDURE — 99999 PR PBB SHADOW E&M-EST. PATIENT-LVL III: CPT | Mod: PBBFAC,,, | Performed by: FAMILY MEDICINE

## 2022-11-14 PROCEDURE — 99499 RISK ADDL DX/OHS AUDIT: ICD-10-PCS | Mod: S$GLB,,, | Performed by: FAMILY MEDICINE

## 2022-11-14 PROCEDURE — 1157F ADVNC CARE PLAN IN RCRD: CPT | Mod: CPTII,S$GLB,, | Performed by: FAMILY MEDICINE

## 2022-11-14 PROCEDURE — 3288F FALL RISK ASSESSMENT DOCD: CPT | Mod: CPTII,S$GLB,, | Performed by: FAMILY MEDICINE

## 2022-11-14 PROCEDURE — 1126F PR PAIN SEVERITY QUANTIFIED, NO PAIN PRESENT: ICD-10-PCS | Mod: CPTII,S$GLB,, | Performed by: FAMILY MEDICINE

## 2022-11-14 PROCEDURE — 3288F PR FALLS RISK ASSESSMENT DOCUMENTED: ICD-10-PCS | Mod: CPTII,S$GLB,, | Performed by: FAMILY MEDICINE

## 2022-11-14 PROCEDURE — 71046 X-RAY EXAM CHEST 2 VIEWS: CPT | Mod: 26,,, | Performed by: RADIOLOGY

## 2022-11-14 PROCEDURE — 1126F AMNT PAIN NOTED NONE PRSNT: CPT | Mod: CPTII,S$GLB,, | Performed by: FAMILY MEDICINE

## 2022-11-14 PROCEDURE — 71046 XR CHEST PA AND LATERAL: ICD-10-PCS | Mod: 26,,, | Performed by: RADIOLOGY

## 2022-11-14 PROCEDURE — 1157F PR ADVANCE CARE PLAN OR EQUIV PRESENT IN MEDICAL RECORD: ICD-10-PCS | Mod: CPTII,S$GLB,, | Performed by: FAMILY MEDICINE

## 2022-11-14 PROCEDURE — 71046 X-RAY EXAM CHEST 2 VIEWS: CPT | Mod: TC,FY,PO

## 2022-11-14 PROCEDURE — 1159F MED LIST DOCD IN RCRD: CPT | Mod: CPTII,S$GLB,, | Performed by: FAMILY MEDICINE

## 2022-11-14 PROCEDURE — 99214 PR OFFICE/OUTPT VISIT, EST, LEVL IV, 30-39 MIN: ICD-10-PCS | Mod: S$GLB,,, | Performed by: FAMILY MEDICINE

## 2022-11-14 PROCEDURE — 3077F PR MOST RECENT SYSTOLIC BLOOD PRESSURE >= 140 MM HG: ICD-10-PCS | Mod: CPTII,S$GLB,, | Performed by: FAMILY MEDICINE

## 2022-11-14 PROCEDURE — 1160F RVW MEDS BY RX/DR IN RCRD: CPT | Mod: CPTII,S$GLB,, | Performed by: FAMILY MEDICINE

## 2022-11-14 PROCEDURE — 99214 OFFICE O/P EST MOD 30 MIN: CPT | Mod: S$GLB,,, | Performed by: FAMILY MEDICINE

## 2022-11-14 PROCEDURE — 1101F PR PT FALLS ASSESS DOC 0-1 FALLS W/OUT INJ PAST YR: ICD-10-PCS | Mod: CPTII,S$GLB,, | Performed by: FAMILY MEDICINE

## 2022-11-14 RX ORDER — CHLORTHALIDONE 25 MG/1
25 TABLET ORAL DAILY
Qty: 90 TABLET | Refills: 3 | Status: SHIPPED | OUTPATIENT
Start: 2022-11-14 | End: 2023-03-27

## 2022-11-14 RX ORDER — AMOXICILLIN AND CLAVULANATE POTASSIUM 875; 125 MG/1; MG/1
1 TABLET, FILM COATED ORAL 2 TIMES DAILY
Qty: 14 TABLET | Refills: 0 | Status: SHIPPED | OUTPATIENT
Start: 2022-11-14 | End: 2022-12-22

## 2022-11-14 RX ORDER — VIT C/E/ZN/COPPR/LUTEIN/ZEAXAN 250MG-90MG
1000 CAPSULE ORAL DAILY
Refills: 0 | COMMUNITY
Start: 2022-11-14 | End: 2022-12-22

## 2022-11-14 NOTE — PROGRESS NOTES
Subjective:       Patient ID: Alexa Otero is a 82 y.o. female.    Chief Complaint: Follow-up (6mth f/u hypertension)    HPI  Review of Systems   Constitutional:  Positive for fatigue. Negative for chills and fever.   HENT:  Positive for congestion, postnasal drip, rhinorrhea and sneezing. Negative for ear discharge, ear pain, sinus pressure and sore throat.    Respiratory:  Positive for cough. Negative for shortness of breath and wheezing.      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    Reviewed labs 11/2022  Wet cough since June 2022.  No nasal congestion or h/o allergies. No fever. Clear d/c. No reflux     Pain mgmt Dr. Robles treating SI joint pain.  C/o persistent pain  Right hip-can't sleep at night. Continuing to do PT. Taking gabapentin 100mg bid , mobic 15mg daily. Steps, walking and lying on that side the worst     History:     Had hospitalization 12/2021 after fall . She was admitted with Acute intratrochanteric comminuted fracture of the right femur and had surgery INSERTION, INTRAMEDULLARY LUC, FEMUR, TROCHANTER/RIGHT TFN DR FAJARDO NOTIFIED REP (Right)  by   Bhavik  She suffered from postoperaive anemia and received one unit pRBC  Later pt was discharged to rehab facility for rehab sessions      DEXA 2/2021 normal      Unusual appearing chronic pelvic mass which may relate to residual uterine 0 or ovarian tissue or postoperative seroma which does indent the dome of the bladder posteriorly.  This is partially visualized and of similar size to a 2016 MRI suggesting a benign etiology.   Unusual configuration to the pancreatic head with mild biliary ductal dilatation.  This is also stable in this patient status post cholecystectomy.(had previous bout of pancreatitis)    Decreased hepatic cysts complex cystic lesion and secondary stable hepatic lesion.   Prominent spinal degenerative changes   No evidence of appendicitis or bowel obstruction.   Colonic diverticulosis and some degree of constipation are evident.      Referred to Dr. Ray due to pain in abd and abnl pelvic mass.  U/s showed solid right adnexal mass.  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  Had port acath placed 10/19/20 . Has now completed chemo 2/2021   Last pet 4/2021 IMPRESSION:  1. Unchanged pleural/parenchymal opacities in the right lung as  outlined above, possibly sequelae of infection/inflammation and/or  scarring, and less likely prostatic disease.  2. Prior hysterectomy with no fluid to specifically suggest  residual/recurrent disease in the abdomen or pelvis.     Type 2 DM- a1c 5.7 urine micro neg, lipids at goal. On ACE I and zocor. Dks tage 3     Eye Dr. Diehl -garth deg     mammo 3/2022 neg     Vit d normal     Taking ortho for knee pain- Dr. Mojica started on mobic 3/10/20. ? CHERELLE     GI Dr. Stephenson treating GERD, h/o barretts diagnosed 2015 Dr. Jimenez.  treated with HALO and Stretta by Dr. Samuel.  Last egd 2018 benign.   Objective:      Physical Exam  Vitals and nursing note reviewed.   Constitutional:       Appearance: She is well-developed.   HENT:      Right  Ear: Tympanic membrane and ear canal normal.      Left Ear: Tympanic membrane and ear canal normal.      Nose: Mucosal edema and rhinorrhea present.      Right Sinus: No maxillary sinus tenderness or frontal sinus tenderness.      Left Sinus: No maxillary sinus tenderness or frontal sinus tenderness.      Mouth/Throat:      Pharynx: Posterior oropharyngeal erythema present. No oropharyngeal exudate.      Tonsils: No tonsillar abscesses.   Cardiovascular:      Rate and Rhythm: Normal rate and regular rhythm.      Heart sounds: Normal heart sounds.   Pulmonary:      Effort: Pulmonary effort is normal.      Breath sounds: Normal breath sounds.   Skin:     General: Skin is warm and dry.       Assessment:       1. Hyperlipidemia, unspecified hyperlipidemia type    2. Primary hypertension    3. Polycythemia, secondary    4. 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 Controlled type 2 diabetes mellitus without complication, without long-term current use of insulin    6. Vitamin D deficiency disease    7. Gastroesophageal reflux disease, unspecified whether esophagitis present    8. Right hip pain    9. Acute bacterial bronchitis    10. Chronic cough        Plan:         1. Hyperlipidemia, unspecified hyperlipidemia type  Controlled on current medications.  Continue current medications.    - Comprehensive Metabolic Panel; Future  - Lipid Panel; Future    2. Primary hypertension  D/c hctz and add  - chlorthalidone (HYGROTEN) 25 MG Tab; Take 1 tablet (25 mg total) by mouth once daily.  Dispense: 90 tablet; Refill: 3    3. Polycythemia, secondary  Cont monitoring and treat prn    4. 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 Controlled type 2 diabetes mellitus without complication, without long-term current use of insulin  Controlled on current medications.  Continue current medications.    - CBC Auto Differential; Future  - Hemoglobin A1C; Future  -  Microalbumin/Creatinine Ratio, Urine; Future    6. Vitamin D deficiency disease  D/c weekly dose and start otc d3 1000 u daily  - Vitamin D; Future    7. Gastroesophageal reflux disease, unspecified whether esophagitis present  Counseled patient on prevention of reflux with changes in diet and behavior.  I recommended avoidance of greasy and spicy foods, caffeine and eating within 3 hours of bedtime.  I counseled the patient to avoid eating large meals and instead eating more frequent small meals.  I also recommended weight loss and elevation of the head of the bed by 6 inches.  If symptoms persist after these changes medication may be needed to control GERD.      8. Right hip pain  Cont ortho care and pain mgmt    9. Acute bacterial bronchitis  treat  - amoxicillin-clavulanate 875-125mg (AUGMENTIN) 875-125 mg per tablet; Take 1 tablet by mouth 2 (two) times daily.  Dispense: 14 tablet; Refill: 0    10. Chronic cough  General home care guidelines for cough and congestion:increase fluid intake, get plenty of rest, and use OTC cough and cold medications for relief of symptoms.  I recommended guafenesin for congestion and dextromethorphan as directed for cough.  Brands like Mucinex DM or Chloricidin HBP or similar are recommended.  Avoidance of decongestants is recommended for patients with heart problems and hypertension.  Extra vitamin C may also benefit.  Return to clinic if symptoms last longer than 10 days or sooner if worsen with symptoms like fever > 100.4, severe sinus pain or headache, thick yellow nasal discharge or sputum, dehydration, sudden confusion or mental status changes, shortness of breath, labored breathing, or lethargy. Anti-fever medications can be alternated like OTC ibuprofen or tylenol as needed and as directed unless told to avoid these by your doctor.     - X-Ray Chest PA And Lateral; Future        Time spent with patient: 20 minutes    Patient with be reevaluated in 6 months or sooner  prn    Greater than 50% of this visit was spent counseling as described in above documentation:Yes

## 2022-11-30 LAB
LEFT EYE DM RETINOPATHY: POSITIVE
RIGHT EYE DM RETINOPATHY: POSITIVE

## 2022-12-02 ENCOUNTER — PATIENT MESSAGE (OUTPATIENT)
Dept: FAMILY MEDICINE | Facility: CLINIC | Age: 82
End: 2022-12-02
Payer: MEDICARE

## 2022-12-12 ENCOUNTER — CLINICAL SUPPORT (OUTPATIENT)
Dept: FAMILY MEDICINE | Facility: CLINIC | Age: 82
End: 2022-12-12
Payer: MEDICARE

## 2022-12-12 ENCOUNTER — PATIENT MESSAGE (OUTPATIENT)
Dept: FAMILY MEDICINE | Facility: CLINIC | Age: 82
End: 2022-12-12

## 2022-12-12 ENCOUNTER — TELEPHONE (OUTPATIENT)
Dept: FAMILY MEDICINE | Facility: CLINIC | Age: 82
End: 2022-12-12

## 2022-12-12 ENCOUNTER — INFUSION (OUTPATIENT)
Dept: INFUSION THERAPY | Facility: HOSPITAL | Age: 82
End: 2022-12-12
Attending: OBSTETRICS & GYNECOLOGY
Payer: MEDICARE

## 2022-12-12 ENCOUNTER — TELEPHONE (OUTPATIENT)
Dept: FAMILY MEDICINE | Facility: CLINIC | Age: 82
End: 2022-12-12
Payer: MEDICARE

## 2022-12-12 VITALS
BODY MASS INDEX: 23.06 KG/M2 | TEMPERATURE: 97 F | HEART RATE: 90 BPM | DIASTOLIC BLOOD PRESSURE: 72 MMHG | SYSTOLIC BLOOD PRESSURE: 120 MMHG | RESPIRATION RATE: 18 BRPM | WEIGHT: 130.13 LBS | HEIGHT: 63 IN | OXYGEN SATURATION: 97 %

## 2022-12-12 VITALS — SYSTOLIC BLOOD PRESSURE: 120 MMHG | HEART RATE: 86 BPM | DIASTOLIC BLOOD PRESSURE: 64 MMHG

## 2022-12-12 DIAGNOSIS — C56.1 CARCINOMA OF RIGHT OVARY: ICD-10-CM

## 2022-12-12 DIAGNOSIS — Z51.11 MAINTENANCE CHEMOTHERAPY: Primary | ICD-10-CM

## 2022-12-12 DIAGNOSIS — Z01.30 BP CHECK: Primary | ICD-10-CM

## 2022-12-12 PROCEDURE — 25000003 PHARM REV CODE 250: Performed by: OBSTETRICS & GYNECOLOGY

## 2022-12-12 PROCEDURE — 99999 PR PBB SHADOW E&M-EST. PATIENT-LVL I: CPT | Mod: PBBFAC,,,

## 2022-12-12 PROCEDURE — A4216 STERILE WATER/SALINE, 10 ML: HCPCS | Performed by: OBSTETRICS & GYNECOLOGY

## 2022-12-12 PROCEDURE — 63600175 PHARM REV CODE 636 W HCPCS: Performed by: OBSTETRICS & GYNECOLOGY

## 2022-12-12 PROCEDURE — 99999 PR PBB SHADOW E&M-EST. PATIENT-LVL I: ICD-10-PCS | Mod: PBBFAC,,,

## 2022-12-12 PROCEDURE — 96523 IRRIG DRUG DELIVERY DEVICE: CPT

## 2022-12-12 RX ORDER — SODIUM CHLORIDE 0.9 % (FLUSH) 0.9 %
10 SYRINGE (ML) INJECTION
Status: DISCONTINUED | OUTPATIENT
Start: 2022-12-12 | End: 2022-12-12 | Stop reason: HOSPADM

## 2022-12-12 RX ORDER — HEPARIN 100 UNIT/ML
500 SYRINGE INTRAVENOUS
Status: CANCELLED
Start: 2022-12-12

## 2022-12-12 RX ORDER — SODIUM CHLORIDE 0.9 % (FLUSH) 0.9 %
10 SYRINGE (ML) INJECTION
Status: CANCELLED
Start: 2022-12-12

## 2022-12-12 RX ORDER — HEPARIN 100 UNIT/ML
500 SYRINGE INTRAVENOUS
Status: COMPLETED | OUTPATIENT
Start: 2022-12-12 | End: 2022-12-12

## 2022-12-12 RX ADMIN — SODIUM CHLORIDE, PRESERVATIVE FREE 10 ML: 5 INJECTION INTRAVENOUS at 11:12

## 2022-12-12 RX ADMIN — HEPARIN 500 UNITS: 100 SYRINGE at 11:12

## 2022-12-12 NOTE — PROGRESS NOTES
Alexa Otero 82 y.o. female is here today for Blood Pressure check.   History of HTN yes.    Review of patient's allergies indicates:  No Known Allergies  Creatinine   Date Value Ref Range Status   11/04/2022 1.1 0.5 - 1.4 mg/dL Final     Sodium   Date Value Ref Range Status   11/04/2022 143 136 - 145 mmol/L Final     Potassium   Date Value Ref Range Status   11/04/2022 4.0 3.5 - 5.1 mmol/L Final   ]  Patient verifies taking blood pressure medications on a regular basis at the same time of the day.     Current Outpatient Medications:     amoxicillin-clavulanate 875-125mg (AUGMENTIN) 875-125 mg per tablet, Take 1 tablet by mouth 2 (two) times daily., Disp: 14 tablet, Rfl: 0    benazepriL (LOTENSIN) 40 MG tablet, TAKE 1 TABLET(40 MG) BY MOUTH EVERY DAY, Disp: 90 tablet, Rfl: 3    chlorthalidone (HYGROTEN) 25 MG Tab, Take 1 tablet (25 mg total) by mouth once daily., Disp: 90 tablet, Rfl: 3    cholecalciferol, vitamin D3, (VITAMIN D3) 25 mcg (1,000 unit) capsule, Take 1 capsule (1,000 Units total) by mouth once daily., Disp: , Rfl: 0    gabapentin (NEURONTIN) 100 MG capsule, Take 1 capsule (100 mg total) by mouth 2 (two) times daily., Disp: 180 capsule, Rfl: 3    meloxicam (MOBIC) 15 MG tablet, TAKE 1 TABLET(15 MG) BY MOUTH EVERY DAY, Disp: 30 tablet, Rfl: 2    metFORMIN (GLUCOPHAGE) 500 MG tablet, TAKE 1 TABLET(500 MG) BY MOUTH TWICE DAILY WITH MEALS, Disp: 180 tablet, Rfl: 3    omeprazole (PRILOSEC) 40 MG capsule, TAKE 1 CAPSULE(40 MG) BY MOUTH EVERY DAY, Disp: 90 capsule, Rfl: 3    simvastatin (ZOCOR) 40 MG tablet, TAKE 1 TABLET(40 MG) BY MOUTH EVERY EVENING, Disp: 90 tablet, Rfl: 3    VIT A/VIT C/VIT E/ZINC/COPPER (ICAPS AREDS ORAL), Take 1 capsule by mouth 2 (two) times a day. , Disp: , Rfl:   No current facility-administered medications for this visit.    Does patient have record of home blood pressure readings no., however confirms she went to the infusion center this   morning and her blood pressure was  120/72  Last dose of blood pressure medication was taken at around 9:00 pm last night.  Patient is asymptomatic.     Initial BP today was 126/68  Blood pressure reading after 15 minutes was 120/64, Pulse 86.    1/6/23 Annual Wellness Visit - Марина Lynch  5/15/23 6 mo fuov with ZION Spears Dr. notified.

## 2022-12-14 NOTE — TELEPHONE ENCOUNTER
Blood pressure is at goal. Cont current meds.  Make sure patient has f/u with me or AL in 3 -6 months for BP recheck

## 2022-12-21 ENCOUNTER — PATIENT MESSAGE (OUTPATIENT)
Dept: FAMILY MEDICINE | Facility: CLINIC | Age: 82
End: 2022-12-21
Payer: MEDICARE

## 2022-12-22 ENCOUNTER — HOSPITAL ENCOUNTER (OUTPATIENT)
Dept: RADIOLOGY | Facility: HOSPITAL | Age: 82
Discharge: HOME | End: 2022-12-22
Attending: ORTHOPAEDIC SURGERY
Payer: MEDICARE

## 2022-12-22 ENCOUNTER — OFFICE VISIT (OUTPATIENT)
Dept: ORTHOPEDICS | Facility: CLINIC | Age: 82
End: 2022-12-22
Payer: MEDICARE

## 2022-12-22 ENCOUNTER — PATIENT MESSAGE (OUTPATIENT)
Dept: ORTHOPEDICS | Facility: CLINIC | Age: 82
End: 2022-12-22

## 2022-12-22 ENCOUNTER — TELEPHONE (OUTPATIENT)
Dept: PAIN MEDICINE | Facility: CLINIC | Age: 82
End: 2022-12-22
Payer: MEDICARE

## 2022-12-22 VITALS — WEIGHT: 130 LBS | HEIGHT: 63 IN | RESPIRATION RATE: 18 BRPM | BODY MASS INDEX: 23.04 KG/M2

## 2022-12-22 DIAGNOSIS — S72.141D CLOSED FRACTURE OF FEMUR, INTERTROCHANTERIC, RIGHT, WITH ROUTINE HEALING, SUBSEQUENT ENCOUNTER: ICD-10-CM

## 2022-12-22 DIAGNOSIS — S72.141D CLOSED FRACTURE OF FEMUR, INTERTROCHANTERIC, RIGHT, WITH ROUTINE HEALING, SUBSEQUENT ENCOUNTER: Primary | ICD-10-CM

## 2022-12-22 PROCEDURE — 1125F AMNT PAIN NOTED PAIN PRSNT: CPT | Mod: CPTII,S$GLB,, | Performed by: ORTHOPAEDIC SURGERY

## 2022-12-22 PROCEDURE — 73502 X-RAY EXAM HIP UNI 2-3 VIEWS: CPT | Mod: TC,PN,RT

## 2022-12-22 PROCEDURE — 99999 PR PBB SHADOW E&M-EST. PATIENT-LVL III: CPT | Mod: PBBFAC,,, | Performed by: ORTHOPAEDIC SURGERY

## 2022-12-22 PROCEDURE — 1159F MED LIST DOCD IN RCRD: CPT | Mod: CPTII,S$GLB,, | Performed by: ORTHOPAEDIC SURGERY

## 2022-12-22 PROCEDURE — 99213 PR OFFICE/OUTPT VISIT, EST, LEVL III, 20-29 MIN: ICD-10-PCS | Mod: S$GLB,,, | Performed by: ORTHOPAEDIC SURGERY

## 2022-12-22 PROCEDURE — 99213 OFFICE O/P EST LOW 20 MIN: CPT | Mod: S$GLB,,, | Performed by: ORTHOPAEDIC SURGERY

## 2022-12-22 PROCEDURE — 99999 PR PBB SHADOW E&M-EST. PATIENT-LVL III: ICD-10-PCS | Mod: PBBFAC,,, | Performed by: ORTHOPAEDIC SURGERY

## 2022-12-22 PROCEDURE — 73502 X-RAY EXAM HIP UNI 2-3 VIEWS: CPT | Mod: 26,RT,, | Performed by: RADIOLOGY

## 2022-12-22 PROCEDURE — 1159F PR MEDICATION LIST DOCUMENTED IN MEDICAL RECORD: ICD-10-PCS | Mod: CPTII,S$GLB,, | Performed by: ORTHOPAEDIC SURGERY

## 2022-12-22 PROCEDURE — 1125F PR PAIN SEVERITY QUANTIFIED, PAIN PRESENT: ICD-10-PCS | Mod: CPTII,S$GLB,, | Performed by: ORTHOPAEDIC SURGERY

## 2022-12-22 PROCEDURE — 1157F PR ADVANCE CARE PLAN OR EQUIV PRESENT IN MEDICAL RECORD: ICD-10-PCS | Mod: CPTII,S$GLB,, | Performed by: ORTHOPAEDIC SURGERY

## 2022-12-22 PROCEDURE — 73502 XR HIP WITH PELVIS WHEN PERFORMED, 2 OR 3  VIEWS RIGHT: ICD-10-PCS | Mod: 26,RT,, | Performed by: RADIOLOGY

## 2022-12-22 PROCEDURE — 1160F PR REVIEW ALL MEDS BY PRESCRIBER/CLIN PHARMACIST DOCUMENTED: ICD-10-PCS | Mod: CPTII,S$GLB,, | Performed by: ORTHOPAEDIC SURGERY

## 2022-12-22 PROCEDURE — 1160F RVW MEDS BY RX/DR IN RCRD: CPT | Mod: CPTII,S$GLB,, | Performed by: ORTHOPAEDIC SURGERY

## 2022-12-22 PROCEDURE — 1157F ADVNC CARE PLAN IN RCRD: CPT | Mod: CPTII,S$GLB,, | Performed by: ORTHOPAEDIC SURGERY

## 2022-12-22 NOTE — TELEPHONE ENCOUNTER
2/23/23 at 1030 am. If this is not a good date Rufina will be back Wed, it will not let me override for an earlier date, so if this is not good let us know Wednesday and we will change the date. Thank you

## 2022-12-22 NOTE — TELEPHONE ENCOUNTER
----- Message from Shahida Haddad LPN sent at 12/22/2022  2:25 PM CST -----  Good afternoon,   Dr. Gutierres would like the attached pt to see Dr. Robles again. I am unable to schedule can you please assist.    Thank you,  Shahida

## 2022-12-22 NOTE — PROGRESS NOTES
Past Medical History:   Diagnosis Date    Arthritis     Cataract     Diabetes mellitus type II 2012    Encounter for blood transfusion     Extra-ovarian endometrioid adenocarcinoma 2020    GERD (gastroesophageal reflux disease)     Hyperlipidemia     Hypertension     Macular degeneration     PONV (postoperative nausea and vomiting)     Squamous cell carcinoma 1980's    precancer of cervix       Past Surgical History:   Procedure Laterality Date    AUGMENTATION OF BREAST      CHOLECYSTECTOMY      ESOPHAGOGASTRODUODENOSCOPY N/A 6/20/2018    Procedure: EGD (ESOPHAGOGASTRODUODENOSCOPY);  Surgeon: Sabino Stephenson MD;  Location: [facility];  Service: Endoscopy;  Laterality: N/A;    HYSTERECTOMY      partial    INSERTION OF TUNNELED CENTRAL VENOUS CATHETER (CVC) WITH SUBCUTANEOUS PORT Right 10/19/2020    Procedure: INSERTION, PORT-A-CATH;  Surgeon: Misti Mcfarland MD;  Location: [facility];  Service: General;  Laterality: Right;    INTRAMEDULLARY RODDING OF TROCHANTER OF FEMUR Right 11/28/2021    Procedure: INSERTION, INTRAMEDULLARY LUC, FEMUR, TROCHANTER/RIGHT TFN DR FAJARDO NOTIFIED REP;  Surgeon: Kp Fajardo MD;  Location: [facility] OR;  Service: Orthopedics;  Laterality: Right;  SKIP    ROBOT-ASSISTED LAPAROSCOPIC LYMPHADENECTOMY USING DA NELLA XI N/A 9/21/2020    Procedure: XI ROBOTIC LYMPHADENECTOMY-pelvic and kell-aortic;  Surgeon: Altagracia Ray MD;  [facility] OR;  Service: OB/GYN;  Laterality: N/A;    ROBOT-ASSISTED LAPAROSCOPIC OMENTECTOMY USING DA NELLA XI N/A 9/21/2020    Procedure: XI ROBOTIC OMENTECTOMY;  Surgeon: Altagracia Ray MD;  [facility] OR;  Service: OB/GYN;  Laterality: N/A;    ROBOT-ASSISTED LAPAROSCOPIC SALPINGO-OOPHORECTOMY USING DA NELLA XI Bilateral 9/21/2020    Procedure: XI ROBOTIC SALPINGO-OOPHORECTOMY;  Surgeon: Altagracia Ray MD;  [facility] OR;  Service: OB/GYN;  Laterality: Bilateral;       Current Outpatient Medications   Medication Sig    benazepriL  (LOTENSIN) 40 MG tablet TAKE 1 TABLET(40 MG) BY MOUTH EVERY DAY    chlorthalidone (HYGROTEN) 25 MG Tab Take 1 tablet (25 mg total) by mouth once daily.    gabapentin (NEURONTIN) 100 MG capsule Take 1 capsule (100 mg total) by mouth 2 (two) times daily.    metFORMIN (GLUCOPHAGE) 500 MG tablet TAKE 1 TABLET(500 MG) BY MOUTH TWICE DAILY WITH MEALS    omeprazole (PRILOSEC) 40 MG capsule TAKE 1 CAPSULE(40 MG) BY MOUTH EVERY DAY    simvastatin (ZOCOR) 40 MG tablet TAKE 1 TABLET(40 MG) BY MOUTH EVERY EVENING    VIT A/VIT C/VIT E/ZINC/COPPER (ICAPS AREDS ORAL) Take 1 capsule by mouth 2 (two) times a day.      No current facility-administered medications for this visit.       Review of patient's allergies indicates:  No Known Allergies    Family History   Problem Relation Age of Onset    Cancer Mother 57        lung    Cancer Father 56        oral    Skin cancer Sister     Breast cancer Maternal Grandmother     Breast cancer Paternal Grandmother     Skin cancer Brother     Melanoma Brother     Skin cancer Brother     Psoriasis Neg Hx     Lupus Neg Hx     Eczema Neg Hx        Social History     Socioeconomic History    Marital status:    Tobacco Use    Smoking status: Former     Packs/day: 1.00     Years: 20.00     Pack years: 20.00     Types: Cigarettes    Smokeless tobacco: Never    Tobacco comments:     quit 40 yrs ago   Substance and Sexual Activity    Alcohol use: Yes     Alcohol/week: 2.0 standard drinks     Types: 2 Glasses of wine per week     Comment: occasional    Drug use: No    Sexual activity: Not Currently     Partners: Male     Social Determinants of Health     Financial Resource Strain: Low Risk     Difficulty of Paying Living Expenses: Not very hard   Food Insecurity: No Food Insecurity    Worried About Running Out of Food in the Last Year: Never true    Ran Out of Food in the Last Year: Never true   Transportation Needs: No Transportation Needs    Lack of Transportation (Medical): No    Lack of  Transportation (Non-Medical): No   Physical Activity: Inactive    Days of Exercise per Week: 0 days    Minutes of Exercise per Session: 40 min   Stress: No Stress Concern Present    Feeling of Stress : Only a little   Social Connections: Unknown    Frequency of Communication with Friends and Family: More than three times a week    Frequency of Social Gatherings with Friends and Family: Once a week    Active Member of Clubs or Organizations: No    Attends Club or Organization Meetings: Never    Marital Status:    Housing Stability: Low Risk     Unable to Pay for Housing in the Last Year: No    Number of Places Lived in the Last Year: 1    Unstable Housing in the Last Year: No       Chief Complaint:   No chief complaint on file.      Date of surgery:  November 28, 2021    History of present illness:  82-year-old female underwent IM nailing for right intertrochanteric femur fracture.  She is still having a lot of pain more in the buttock area.  She saw Dr. Robles a few months ago but was not hurting at that time.  Pain is an 8/10.  Pain at night.  Trouble sleeping.  Painful to straighten her leg or to get up from a chair.    Review of Systems:    Musculoskeletal:  See HPI        Physical Examination:    Vital Signs:    There were no vitals filed for this visit.      There is no height or weight on file to calculate BMI.    This a well-developed, well nourished patient in no acute distress.  They are alert and oriented and cooperative to examination.  Pt. walks without an antalgic gait.    Examination of the right hip shows healed surgical incisions.  No erythema drainage.  Minimal pain with range of motion of the hip.  Is a little tight on the right hip with range of motion    X-rays:  X-rays of the right hip are ordered and review which show a tati and screw in position.   Interval healing is noted.  X-rays of the right knee are  reviewed which show some moderate arthritis in both knees with medial joint space  narrowing.     Assessment::  Status post IM nailing right intertrochanteric hip fracture  Right piriformis syndrome versus SI joint issues    Plan:  Reviewed the x-rays with her today.  We will get her back in with Dr. Robles to try an injection.  Follow-up as needed.    This note was created using [employer] voice recognition software that occasionally misinterpreted phrases or words.

## 2022-12-23 ENCOUNTER — OFFICE VISIT (OUTPATIENT)
Dept: PAIN MEDICINE | Facility: CLINIC | Age: 82
End: 2022-12-23
Payer: MEDICARE

## 2022-12-23 DIAGNOSIS — M79.18 ACUTE MYOFASCIAL PAIN: ICD-10-CM

## 2022-12-23 DIAGNOSIS — M46.1 SACROILIITIS: Primary | ICD-10-CM

## 2022-12-23 DIAGNOSIS — M25.551 RIGHT HIP PAIN: ICD-10-CM

## 2022-12-23 PROCEDURE — 1101F PT FALLS ASSESS-DOCD LE1/YR: CPT | Mod: CPTII,S$GLB,,

## 2022-12-23 PROCEDURE — 99499 UNLISTED E&M SERVICE: CPT | Mod: S$GLB,,,

## 2022-12-23 PROCEDURE — 1101F PR PT FALLS ASSESS DOC 0-1 FALLS W/OUT INJ PAST YR: ICD-10-PCS | Mod: CPTII,S$GLB,,

## 2022-12-23 PROCEDURE — 99214 PR OFFICE/OUTPT VISIT, EST, LEVL IV, 30-39 MIN: ICD-10-PCS | Mod: 25,S$GLB,,

## 2022-12-23 PROCEDURE — 99999 PR PBB SHADOW E&M-EST. PATIENT-LVL III: CPT | Mod: PBBFAC,,,

## 2022-12-23 PROCEDURE — 99499 RISK ADDL DX/OHS AUDIT: ICD-10-PCS | Mod: S$GLB,,,

## 2022-12-23 PROCEDURE — 99214 OFFICE O/P EST MOD 30 MIN: CPT | Mod: 25,S$GLB,,

## 2022-12-23 PROCEDURE — 1157F PR ADVANCE CARE PLAN OR EQUIV PRESENT IN MEDICAL RECORD: ICD-10-PCS | Mod: CPTII,S$GLB,,

## 2022-12-23 PROCEDURE — 96372 THER/PROPH/DIAG INJ SC/IM: CPT | Mod: S$GLB,,,

## 2022-12-23 PROCEDURE — 1160F PR REVIEW ALL MEDS BY PRESCRIBER/CLIN PHARMACIST DOCUMENTED: ICD-10-PCS | Mod: CPTII,S$GLB,,

## 2022-12-23 PROCEDURE — 1159F MED LIST DOCD IN RCRD: CPT | Mod: CPTII,S$GLB,,

## 2022-12-23 PROCEDURE — 96372 PR INJECTION,THERAP/PROPH/DIAG2ST, IM OR SUBCUT: ICD-10-PCS | Mod: S$GLB,,,

## 2022-12-23 PROCEDURE — 1159F PR MEDICATION LIST DOCUMENTED IN MEDICAL RECORD: ICD-10-PCS | Mod: CPTII,S$GLB,,

## 2022-12-23 PROCEDURE — 1157F ADVNC CARE PLAN IN RCRD: CPT | Mod: CPTII,S$GLB,,

## 2022-12-23 PROCEDURE — 3288F FALL RISK ASSESSMENT DOCD: CPT | Mod: CPTII,S$GLB,,

## 2022-12-23 PROCEDURE — 1160F RVW MEDS BY RX/DR IN RCRD: CPT | Mod: CPTII,S$GLB,,

## 2022-12-23 PROCEDURE — 3288F PR FALLS RISK ASSESSMENT DOCUMENTED: ICD-10-PCS | Mod: CPTII,S$GLB,,

## 2022-12-23 PROCEDURE — 99999 PR PBB SHADOW E&M-EST. PATIENT-LVL III: ICD-10-PCS | Mod: PBBFAC,,,

## 2022-12-23 RX ORDER — KETOROLAC TROMETHAMINE 30 MG/ML
30 INJECTION, SOLUTION INTRAMUSCULAR; INTRAVENOUS ONCE
Status: COMPLETED | OUTPATIENT
Start: 2022-12-23 | End: 2022-12-23

## 2022-12-23 RX ORDER — METHYLPREDNISOLONE ACETATE 40 MG/ML
40 INJECTION, SUSPENSION INTRA-ARTICULAR; INTRALESIONAL; INTRAMUSCULAR; SOFT TISSUE
Status: COMPLETED | OUTPATIENT
Start: 2022-12-23 | End: 2022-12-23

## 2022-12-23 RX ADMIN — KETOROLAC TROMETHAMINE 30 MG: 30 INJECTION, SOLUTION INTRAMUSCULAR; INTRAVENOUS at 11:12

## 2022-12-23 RX ADMIN — METHYLPREDNISOLONE ACETATE 40 MG: 40 INJECTION, SUSPENSION INTRA-ARTICULAR; INTRALESIONAL; INTRAMUSCULAR; SOFT TISSUE at 11:12

## 2022-12-23 NOTE — PROGRESS NOTES
This note was completed with dictation software and grammatical errors may exist.    Referring Physician: No ref. provider found    PCP: Idalia Greco MD      CC: right buttock pain    HPI:   Alexa Otero is a 82 y.o. female referred to us for right buttock pain.  Patient has significant history of ORIF of the right hip November 2021.  She did well postoperatively.  She does have residual right groin pain.  She presents to us with intermittent throbbing, grabbing, tight pain over her right buttock.  Pain radiates down her right leg at times.  Pain worsens with bending, lying, getting up.  Pain improves with rest.  She has undergone physical therapy with mild benefits.  She has been evaluated by orthopedics.  She referred to us for consideration of sacroiliac joint dysfunction.  However, she states her pain is currently tolerable.  Denies any worsening weakness.  No bowel bladder changes.    INTERVAL HPI:   Alexa Otero is a 82 y.o. female established patient, new to me, for the management of right hip and buttocks pain.  Patient has significant history of ORIF of the right hip November 2021.  The patient presents today with right sided hip pain that radiates down her right buttocks and the anterior portion of her RLE above her knee.  The patient reports pain has recently began to increase. The patient was evaluated by ortho MD yesterday who advised the patient to follow up with Pain management for an injections. She does have residual right groin pain.  She presents to us with intermittent throbbing, grabbing, tight pain over her right buttock.  Pain radiates down her right leg at times.  Pain worsens with bending, lying, getting up.  Pain improves with rest.  She has undergone physical therapy with mild benefits. The patient denies of any significant changes in her health since her last appointment. The patient also denies of any changes in the character of her pain since her last appointment.  Denies any  worsening weakness.  No bowel bladder changes.    ROS:  CONSTITUTIONAL: No fevers, chills, night sweats, wt. loss, appetite changes  SKIN: no rashes or itching  ENT: No headaches, head trauma, vision changes, or eye pain  LYMPH NODES: None noticed   CV: No chest pain, palpitations.   RESP: No shortness of breath, dyspnea on exertion, cough, wheezing, or hemoptysis  GI: No nausea, emesis, diarrhea, constipation, melena, hematochezia, pain.    : No dysuria, hematuria, urgency, or frequency   HEME: No easy bruising, bleeding problems  PSYCHIATRIC: No depression, anxiety, psychosis, hallucinations.  NEURO: No seizures, memory loss, dizziness or difficulty sleeping  MSK:  Positive HPI    MEDICAL, SURGICAL, FAMILY, SOCIAL HX: reviewed    MEDICATIONS/ALLERGIES: reviewed         Medications/Allergies: See med card    There were no vitals filed for this visit.        Physical exam:    GENERAL: A and O x3, the patient appears well groomed and is in no acute distress.  Skin: No rashes or obvious lesions  HEENT: normocephalic, atraumatic  CARDIOVASCULAR:  Palpable peripheral pulses  LUNGS: easy work of breathing  ABDOMEN: soft, nontender   UPPER EXTREMITIES: Normal alignment, normal range of motion, no atrophy, no skin changes,  hair growth and nail growth normal and equal bilaterally. No swelling, no tenderness.    LOWER EXTREMITIES:  Normal alignment, normal range of motion, no atrophy, no skin changes,  hair growth and nail growth normal and equal bilaterally. No swelling, no tenderness.  LUMBAR SPINE  Lumbar spine: ROM is limited with flexion extension and oblique extension with mild increased pain.    Byron's test causes increased pain on right side.    Supine straight leg raise is negative bilaterally.    Internal and external rotation of the hip causes no increased pain on either side.  Myofascial exam: No tenderness to palpation across lumbar paraspinous muscles.      MENTAL STATUS: normal orientation, speech,  language, and fund of knowledge for social situation.  Emotional state appropriate.    MOTOR: Tone and bulk: normal bilateral upper and lower Strength: normal     SENSATION: Light touch and pinprick intact bilaterally  REFLEXES: normal, symmetric, nonbrisk.  Toes down, no clonus. No hoffmans.  GAIT: antalgic gait       Imagin2022: EXAMINATION:  XR HIP WITH PELVIS WHEN PERFORMED, 2 OR 3  VIEWS RIGHT     CLINICAL HISTORY:  Displaced intertrochanteric fracture of right femur, subsequent encounter for closed fracture with routine healing     TECHNIQUE:  AP view of the pelvis and frog leg lateral view of the right hip were performed.     COMPARISON:  2022     FINDINGS:  There is no intramedullary tati with proximal screw.  No new Cherrie prosthetic fractures visualized.  There is no evidence periprosthetic lucency.  Both hips are located.     Impression:     The patient is status post repair of a right intertrochanteric hip fracture.  No new fractures visualized.       Assessment:  Patient presents with right buttock pain and right hip pain. The patient is new to me.  The patient does have some reproducible pain with SI Joint provocation to the right side.    1. Sacroiliitis    2. Right hip pain    3. Acute myofascial pain            Plan:  - Toradol 30 mg given IM in clinic   - Methylprednisolone 40 mg IM given in clinic    - I have stressed the importance of physical activity and exercise to improve overall health  - Reviewed pertinent imaging and records with patient  - Schedule for Right SI joint injection.   - RTC for the procedure as outlined above   Martha Clark NP

## 2022-12-29 ENCOUNTER — PATIENT OUTREACH (OUTPATIENT)
Dept: ADMINISTRATIVE | Facility: HOSPITAL | Age: 82
End: 2022-12-29
Payer: MEDICARE

## 2022-12-29 NOTE — PROGRESS NOTES
External record ( Diabetic Eye Exam ) hyperlinked in Health Maintenance.    Immunization reconciled.

## 2023-01-03 ENCOUNTER — TELEPHONE (OUTPATIENT)
Dept: PAIN MEDICINE | Facility: CLINIC | Age: 83
End: 2023-01-03
Payer: MEDICARE

## 2023-01-06 ENCOUNTER — OFFICE VISIT (OUTPATIENT)
Dept: FAMILY MEDICINE | Facility: CLINIC | Age: 83
End: 2023-01-06
Payer: MEDICARE

## 2023-01-06 VITALS
OXYGEN SATURATION: 96 % | BODY MASS INDEX: 22.73 KG/M2 | HEART RATE: 74 BPM | WEIGHT: 128.31 LBS | SYSTOLIC BLOOD PRESSURE: 118 MMHG | HEIGHT: 63 IN | DIASTOLIC BLOOD PRESSURE: 66 MMHG | TEMPERATURE: 98 F

## 2023-01-06 DIAGNOSIS — G62.0 PERIPHERAL NEUROPATHY DUE TO CHEMOTHERAPY: ICD-10-CM

## 2023-01-06 DIAGNOSIS — C56.1 CARCINOMA OF RIGHT OVARY: ICD-10-CM

## 2023-01-06 DIAGNOSIS — Z00.00 ENCOUNTER FOR PREVENTIVE HEALTH EXAMINATION: Primary | ICD-10-CM

## 2023-01-06 DIAGNOSIS — R15.9 INCONTINENCE OF FECES, UNSPECIFIED FECAL INCONTINENCE TYPE: ICD-10-CM

## 2023-01-06 DIAGNOSIS — E11.59 HYPERTENSION ASSOCIATED WITH DIABETES: ICD-10-CM

## 2023-01-06 DIAGNOSIS — N18.30 CONTROLLED TYPE 2 DIABETES MELLITUS WITH STAGE 3 CHRONIC KIDNEY DISEASE, WITHOUT LONG-TERM CURRENT USE OF INSULIN: ICD-10-CM

## 2023-01-06 DIAGNOSIS — E11.69 HYPERLIPIDEMIA ASSOCIATED WITH TYPE 2 DIABETES MELLITUS: ICD-10-CM

## 2023-01-06 DIAGNOSIS — T45.1X5A PERIPHERAL NEUROPATHY DUE TO CHEMOTHERAPY: ICD-10-CM

## 2023-01-06 DIAGNOSIS — E78.5 HYPERLIPIDEMIA ASSOCIATED WITH TYPE 2 DIABETES MELLITUS: ICD-10-CM

## 2023-01-06 DIAGNOSIS — I70.0 ATHEROSCLEROSIS OF AORTA: ICD-10-CM

## 2023-01-06 DIAGNOSIS — I15.2 HYPERTENSION ASSOCIATED WITH DIABETES: ICD-10-CM

## 2023-01-06 DIAGNOSIS — E11.22 CONTROLLED TYPE 2 DIABETES MELLITUS WITH STAGE 3 CHRONIC KIDNEY DISEASE, WITHOUT LONG-TERM CURRENT USE OF INSULIN: ICD-10-CM

## 2023-01-06 DIAGNOSIS — R19.7 DIARRHEA, UNSPECIFIED TYPE: ICD-10-CM

## 2023-01-06 PROCEDURE — 3288F PR FALLS RISK ASSESSMENT DOCUMENTED: ICD-10-PCS | Mod: HCNC,CPTII,S$GLB, | Performed by: NURSE PRACTITIONER

## 2023-01-06 PROCEDURE — 3078F DIAST BP <80 MM HG: CPT | Mod: HCNC,CPTII,S$GLB, | Performed by: NURSE PRACTITIONER

## 2023-01-06 PROCEDURE — 3074F SYST BP LT 130 MM HG: CPT | Mod: HCNC,CPTII,S$GLB, | Performed by: NURSE PRACTITIONER

## 2023-01-06 PROCEDURE — 1170F PR FUNCTIONAL STATUS ASSESSED: ICD-10-PCS | Mod: HCNC,CPTII,S$GLB, | Performed by: NURSE PRACTITIONER

## 2023-01-06 PROCEDURE — 99999 PR PBB SHADOW E&M-EST. PATIENT-LVL V: ICD-10-PCS | Mod: PBBFAC,HCNC,, | Performed by: NURSE PRACTITIONER

## 2023-01-06 PROCEDURE — 1126F AMNT PAIN NOTED NONE PRSNT: CPT | Mod: HCNC,CPTII,S$GLB, | Performed by: NURSE PRACTITIONER

## 2023-01-06 PROCEDURE — 3074F PR MOST RECENT SYSTOLIC BLOOD PRESSURE < 130 MM HG: ICD-10-PCS | Mod: HCNC,CPTII,S$GLB, | Performed by: NURSE PRACTITIONER

## 2023-01-06 PROCEDURE — G0439 PR MEDICARE ANNUAL WELLNESS SUBSEQUENT VISIT: ICD-10-PCS | Mod: HCNC,S$GLB,, | Performed by: NURSE PRACTITIONER

## 2023-01-06 PROCEDURE — 1126F PR PAIN SEVERITY QUANTIFIED, NO PAIN PRESENT: ICD-10-PCS | Mod: HCNC,CPTII,S$GLB, | Performed by: NURSE PRACTITIONER

## 2023-01-06 PROCEDURE — 1159F MED LIST DOCD IN RCRD: CPT | Mod: HCNC,CPTII,S$GLB, | Performed by: NURSE PRACTITIONER

## 2023-01-06 PROCEDURE — 3288F FALL RISK ASSESSMENT DOCD: CPT | Mod: HCNC,CPTII,S$GLB, | Performed by: NURSE PRACTITIONER

## 2023-01-06 PROCEDURE — 1170F FXNL STATUS ASSESSED: CPT | Mod: HCNC,CPTII,S$GLB, | Performed by: NURSE PRACTITIONER

## 2023-01-06 PROCEDURE — 1101F PR PT FALLS ASSESS DOC 0-1 FALLS W/OUT INJ PAST YR: ICD-10-PCS | Mod: HCNC,CPTII,S$GLB, | Performed by: NURSE PRACTITIONER

## 2023-01-06 PROCEDURE — 1157F ADVNC CARE PLAN IN RCRD: CPT | Mod: HCNC,CPTII,S$GLB, | Performed by: NURSE PRACTITIONER

## 2023-01-06 PROCEDURE — 1101F PT FALLS ASSESS-DOCD LE1/YR: CPT | Mod: HCNC,CPTII,S$GLB, | Performed by: NURSE PRACTITIONER

## 2023-01-06 PROCEDURE — 1160F RVW MEDS BY RX/DR IN RCRD: CPT | Mod: HCNC,CPTII,S$GLB, | Performed by: NURSE PRACTITIONER

## 2023-01-06 PROCEDURE — 99999 PR PBB SHADOW E&M-EST. PATIENT-LVL V: CPT | Mod: PBBFAC,HCNC,, | Performed by: NURSE PRACTITIONER

## 2023-01-06 PROCEDURE — 1160F PR REVIEW ALL MEDS BY PRESCRIBER/CLIN PHARMACIST DOCUMENTED: ICD-10-PCS | Mod: HCNC,CPTII,S$GLB, | Performed by: NURSE PRACTITIONER

## 2023-01-06 PROCEDURE — 1157F PR ADVANCE CARE PLAN OR EQUIV PRESENT IN MEDICAL RECORD: ICD-10-PCS | Mod: HCNC,CPTII,S$GLB, | Performed by: NURSE PRACTITIONER

## 2023-01-06 PROCEDURE — G0439 PPPS, SUBSEQ VISIT: HCPCS | Mod: HCNC,S$GLB,, | Performed by: NURSE PRACTITIONER

## 2023-01-06 PROCEDURE — 3078F PR MOST RECENT DIASTOLIC BLOOD PRESSURE < 80 MM HG: ICD-10-PCS | Mod: HCNC,CPTII,S$GLB, | Performed by: NURSE PRACTITIONER

## 2023-01-06 PROCEDURE — 1159F PR MEDICATION LIST DOCUMENTED IN MEDICAL RECORD: ICD-10-PCS | Mod: HCNC,CPTII,S$GLB, | Performed by: NURSE PRACTITIONER

## 2023-01-06 RX ORDER — HYDROCHLOROTHIAZIDE 25 MG/1
25 TABLET ORAL
COMMUNITY
Start: 2022-12-31 | End: 2023-01-06

## 2023-01-06 NOTE — PROGRESS NOTES
"  Alexa Otero presented for a  Medicare AWV and comprehensive Health Risk Assessment today. The following components were reviewed and updated:    Medical history  Family History  Social history  Allergies and Current Medications  Health Risk Assessment  Health Maintenance  Care Team         ** See Completed Assessments for Annual Wellness Visit within the encounter summary.**         The following assessments were completed:  Living Situation  CAGE  Depression Screening  Timed Get Up and Go  Whisper Test  Cognitive Function Screening  Nutrition Screening  ADL Screening  PAQ Screening          Vitals:    01/06/23 1343   BP: 118/66   Pulse: 74   Temp: 97.7 °F (36.5 °C)   TempSrc: Oral   SpO2: 96%   Weight: 58.2 kg (128 lb 4.9 oz)   Height: 5' 3" (1.6 m)     Body mass index is 22.73 kg/m².  Physical Exam  Constitutional:       Appearance: She is well-developed.   HENT:      Head: Normocephalic and atraumatic.      Nose: Nose normal.   Eyes:      General: Lids are normal.      Conjunctiva/sclera: Conjunctivae normal.      Pupils: Pupils are equal, round, and reactive to light.   Cardiovascular:      Rate and Rhythm: Normal rate.   Pulmonary:      Effort: Pulmonary effort is normal.   Musculoskeletal:      Cervical back: Full passive range of motion without pain.   Skin:     General: Skin is warm and dry.   Neurological:      Mental Status: She is alert and oriented to person, place, and time.   Psychiatric:         Speech: Speech normal.         Behavior: Behavior normal.             Diagnoses and health risks identified today and associated recommendations/orders:    1. Encounter for preventive health examination  Discussed health maintenance guidelines appropriate for age.    Review for Opioid Screening: Patient does not have rx for Opioids.    Review for Substance Use Disorders: Patient does not use substance.      2. Hypertension associated with diabetes  Controlled, continue current medication regimen  Low salt " diet  Increase physical activity  Followed by pcp      3. Hyperlipidemia associated with type 2 diabetes mellitus  Controlled, continue current medication regimen  Patient taking statin  Followed by pcp      4. Atherosclerosis of aorta  Stable, continue to [facility]     5. Incontinence of feces, unspecified fecal incontinence type    - Ambulatory referral/consult to Gastroenterology; Future    6. Diarrhea, unspecified type    - Ambulatory referral/consult to Gastroenterology; Future    7. Peripheral neuropathy due to chemotherapy  Stable, continue to [facility]     8. 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 Controlled type 2 diabetes mellitus with stage 3 chronic kidney disease, without long-term current use of insulin  Controlled, continue current medication regimen  Last HgA1c - 5.7  ADA diet  Increase physical activity   Followed by pcp        Provided Alexa with a 5-10 year written screening schedule and personal prevention plan. Recommendations were developed using the USPSTF age appropriate recommendations. Education, counseling, and referrals were provided as needed. After Visit Summary printed and given to patient which includes a list of additional screenings\tests needed.    No follow-ups on file.    Марина Lynch NP

## 2023-01-06 NOTE — PATIENT INSTRUCTIONS
Counseling and Referral of Other Preventative  (Italic type indicates deductible and co-insurance are waived)    Patient Name: Alexa Otero  Today's Date: 1/6/2023    Health Maintenance       Date Due Completion Date    Diabetes Urine Screening 04/28/2023 4/28/2022    Lipid Panel 04/28/2023 4/28/2022    Hemoglobin A1c 05/04/2023 11/4/2022    Eye Exam 11/30/2023 11/30/2022    Override on 8/31/2016: Done (Dr Diehl   report sent to scanning    kb)    DEXA Scan 02/11/2024 2/11/2021    TETANUS VACCINE 06/17/2029 6/17/2019        No orders of the defined types were placed in this encounter.    The following information is provided to all patients.  This information is to help you find resources for any of the problems found today that may be affecting your health:                Living healthy guide: www.[facility].[place].gov      Understanding Diabetes: www.diabetes.org      Eating healthy: www.cdc.gov/healthyweight      [facility] home safety checklist: www.cdc.gov/steadi/patient.html      Agency on Aging: www.goea.[place].[facility]      Alcoholics anonymous (AA): www.aa.org      Physical Activity: www.estela.nih.gov/zq2jgcg      Tobacco use: www.quitwithusla.org

## 2023-01-06 NOTE — H&P (VIEW-ONLY)
"  Alexa Otero presented for a  Medicare AWV and comprehensive Health Risk Assessment today. The following components were reviewed and updated:    Medical history  Family History  Social history  Allergies and Current Medications  Health Risk Assessment  Health Maintenance  Care Team         ** See Completed Assessments for Annual Wellness Visit within the encounter summary.**         The following assessments were completed:  Living Situation  CAGE  Depression Screening  Timed Get Up and Go  Whisper Test  Cognitive Function Screening  Nutrition Screening  ADL Screening  PAQ Screening          Vitals:    01/06/23 1343   BP: 118/66   Pulse: 74   Temp: 97.7 °F (36.5 °C)   TempSrc: Oral   SpO2: 96%   Weight: 58.2 kg (128 lb 4.9 oz)   Height: 5' 3" (1.6 m)     Body mass index is 22.73 kg/m².  Physical Exam  Constitutional:       Appearance: She is well-developed.   HENT:      Head: Normocephalic and atraumatic.      Nose: Nose normal.   Eyes:      General: Lids are normal.      Conjunctiva/sclera: Conjunctivae normal.      Pupils: Pupils are equal, round, and reactive to light.   Cardiovascular:      Rate and Rhythm: Normal rate.   Pulmonary:      Effort: Pulmonary effort is normal.   Musculoskeletal:      Cervical back: Full passive range of motion without pain.   Skin:     General: Skin is warm and dry.   Neurological:      Mental Status: She is alert and oriented to person, place, and time.   Psychiatric:         Speech: Speech normal.         Behavior: Behavior normal.             Diagnoses and health risks identified today and associated recommendations/orders:    1. Encounter for preventive health examination  Discussed health maintenance guidelines appropriate for age.    Review for Opioid Screening: Patient does not have rx for Opioids.    Review for Substance Use Disorders: Patient does not use substance.      2. Hypertension associated with diabetes  Controlled, continue current medication regimen  Low salt " diet  Increase physical activity  Followed by pcp      3. Hyperlipidemia associated with type 2 diabetes mellitus  Controlled, continue current medication regimen  Patient taking statin  Followed by pcp      4. Atherosclerosis of aorta  Stable, continue to [facility]     5. Incontinence of feces, unspecified fecal incontinence type    - Ambulatory referral/consult to Gastroenterology; Future    6. Diarrhea, unspecified type    - Ambulatory referral/consult to Gastroenterology; Future    7. Peripheral neuropathy due to chemotherapy  Stable, continue to [facility]     8. 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 Controlled type 2 diabetes mellitus with stage 3 chronic kidney disease, without long-term current use of insulin  Controlled, continue current medication regimen  Last HgA1c - 5.7  ADA diet  Increase physical activity   Followed by pcp        Provided Alexa with a 5-10 year written screening schedule and personal prevention plan. Recommendations were developed using the USPSTF age appropriate recommendations. Education, counseling, and referrals were provided as needed. After Visit Summary printed and given to patient which includes a list of additional screenings\tests needed.    No follow-ups on file.    Марина Lynch NP

## 2023-01-06 NOTE — PROGRESS NOTES
"  Alexa Otero presented for a  Medicare AWV and comprehensive Health Risk Assessment today. The following components were reviewed and updated:    Medical history  Family History  Social history  Allergies and Current Medications  Health Risk Assessment  Health Maintenance  Care Team         ** See Completed Assessments for Annual Wellness Visit within the encounter summary.**         The following assessments were completed:  Living Situation  CAGE  Depression Screening  Timed Get Up and Go  Whisper Test  Cognitive Function Screening  Nutrition Screening  ADL Screening  PAQ Screening        Vitals:    01/06/23 1343   BP: 118/66   Pulse: 74   Temp: 97.7 °F (36.5 °C)   TempSrc: Oral   SpO2: 96%   Weight: 58.2 kg (128 lb 4.9 oz)   Height: 5' 3" (1.6 m)             Diagnoses and health risks identified today and associated recommendations/orders:    1. Encounter for preventive health examination  ***    2. Hypertension associated with diabetes  ***    3. Hyperlipidemia associated with type 2 diabetes mellitus  ***    4. Atherosclerosis of aorta  ***    5. Incontinence of feces, unspecified fecal incontinence type  ***  - Ambulatory referral/consult to Gastroenterology; Future    6. Diarrhea, unspecified type  ***  - Ambulatory referral/consult to Gastroenterology; Future    7. Peripheral neuropathy due to chemotherapy  ***    8. 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 Controlled type 2 diabetes mellitus with stage 3 chronic kidney disease, without long-term current use of insulin  ***      Provided Alexa with a 5-10 year written screening schedule and personal prevention plan. Recommendations were developed using the USPSTF age appropriate recommendations. Education, counseling, and referrals were provided as needed. After Visit Summary printed and given to patient which includes a list of additional screenings\tests needed.    No follow-ups on file.    Марина Lynch NP    Alexa Otero " "presented for a  Medicare AWV and comprehensive Health Risk Assessment today. The following components were reviewed and updated:    Medical history  Family History  Social history  Allergies and Current Medications  Health Risk Assessment  Health Maintenance  Care Team         ** See Completed Assessments for Annual Wellness Visit within the encounter summary.**         The following assessments were completed:  Living Situation  CAGE  Depression Screening  Timed Get Up and Go  Whisper Test  Cognitive Function Screening  Nutrition Screening  ADL Screening  PAQ Screening        Vitals:    01/06/23 1343   BP: 118/66   Pulse: 74   Temp: 97.7 °F (36.5 °C)   TempSrc: Oral   SpO2: 96%   Weight: 58.2 kg (128 lb 4.9 oz)   Height: 5' 3" (1.6 m)     Body mass index is 22.73 kg/m².  Physical Exam  Constitutional:       Appearance: She is well-developed.   HENT:      Head: Normocephalic and atraumatic.      Nose: Nose normal.   Eyes:      General: Lids are normal.      Conjunctiva/sclera: Conjunctivae normal.      Pupils: Pupils are equal, round, and reactive to light.   Cardiovascular:      Rate and Rhythm: Normal rate.   Pulmonary:      Effort: Pulmonary effort is normal.   Musculoskeletal:      Cervical back: Full passive range of motion without pain.   Skin:     General: Skin is warm and dry.   Neurological:      Mental Status: She is alert and oriented to person, place, and time.   Psychiatric:         Speech: Speech normal.         Behavior: Behavior normal.             Diagnoses and health risks identified today and associated recommendations/orders:    1. Encounter for preventive health examination  ***    2. Hypertension associated with diabetes  ***    3. Hyperlipidemia associated with type 2 diabetes mellitus  ***    4. Atherosclerosis of aorta  ***    5. Incontinence of feces, unspecified fecal incontinence type  ***  - Ambulatory referral/consult to Gastroenterology; Future    6. Diarrhea, unspecified type  ***  - " Ambulatory referral/consult to Gastroenterology; Future    7. Peripheral neuropathy due to chemotherapy  ***    8. 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 Controlled type 2 diabetes mellitus with stage 3 chronic kidney disease, without long-term current use of insulin  ***      Provided Alexa with a 5-10 year written screening schedule and personal prevention plan. Recommendations were developed using the USPSTF age appropriate recommendations. Education, counseling, and referrals were provided as needed. After Visit Summary printed and given to patient which includes a list of additional screenings\tests needed.    No follow-ups on file.    Марина Lynch NP

## 2023-01-06 NOTE — PROGRESS NOTES
I offered to discuss advanced care planning, including how to pick a person who would make decisions for you if you were unable to make them for yourself, called a health care power of , and what kind of decisions you might make such as use of life sustaining treatments such as ventilators and tube feeding when faced with a life limiting illness recorded on a living will that they will need to know. (How you want to be cared for as you near the end of your natural life)     X  Patient has advanced directives on file, which we reviewed, and they do not wish to make changes.

## 2023-01-23 ENCOUNTER — INFUSION (OUTPATIENT)
Dept: INFUSION THERAPY | Facility: HOSPITAL | Age: 83
End: 2023-01-23
Attending: OBSTETRICS & GYNECOLOGY
Payer: MEDICARE

## 2023-01-23 VITALS
BODY MASS INDEX: 23.25 KG/M2 | TEMPERATURE: 97 F | DIASTOLIC BLOOD PRESSURE: 79 MMHG | SYSTOLIC BLOOD PRESSURE: 160 MMHG | HEIGHT: 63 IN | RESPIRATION RATE: 18 BRPM | OXYGEN SATURATION: 98 % | WEIGHT: 131.19 LBS | HEART RATE: 92 BPM

## 2023-01-23 DIAGNOSIS — C56.1 CARCINOMA OF RIGHT OVARY: ICD-10-CM

## 2023-01-23 DIAGNOSIS — Z51.11 MAINTENANCE CHEMOTHERAPY: Primary | ICD-10-CM

## 2023-01-23 PROCEDURE — 25000003 PHARM REV CODE 250: Performed by: OBSTETRICS & GYNECOLOGY

## 2023-01-23 PROCEDURE — 63600175 PHARM REV CODE 636 W HCPCS: Performed by: OBSTETRICS & GYNECOLOGY

## 2023-01-23 PROCEDURE — A4216 STERILE WATER/SALINE, 10 ML: HCPCS | Performed by: OBSTETRICS & GYNECOLOGY

## 2023-01-23 PROCEDURE — 96523 IRRIG DRUG DELIVERY DEVICE: CPT

## 2023-01-23 RX ORDER — SODIUM CHLORIDE 0.9 % (FLUSH) 0.9 %
10 SYRINGE (ML) INJECTION
Status: DISCONTINUED | OUTPATIENT
Start: 2023-01-23 | End: 2023-01-23 | Stop reason: HOSPADM

## 2023-01-23 RX ORDER — SODIUM CHLORIDE 0.9 % (FLUSH) 0.9 %
10 SYRINGE (ML) INJECTION
Status: CANCELLED
Start: 2023-01-23

## 2023-01-23 RX ORDER — HEPARIN 100 UNIT/ML
500 SYRINGE INTRAVENOUS
Status: COMPLETED | OUTPATIENT
Start: 2023-01-23 | End: 2023-01-23

## 2023-01-23 RX ORDER — HEPARIN 100 UNIT/ML
500 SYRINGE INTRAVENOUS
Status: CANCELLED
Start: 2023-01-23

## 2023-01-23 RX ADMIN — SODIUM CHLORIDE, PRESERVATIVE FREE 10 ML: 5 INJECTION INTRAVENOUS at 02:01

## 2023-01-23 RX ADMIN — HEPARIN 500 UNITS: 100 SYRINGE at 02:01

## 2023-01-23 NOTE — PLAN OF CARE
Problem: Fall Injury Risk  Goal: Absence of Fall and Fall-Related Injury  Outcome: Ongoing, Progressing  Intervention: Identify and Manage Contributors  Flowsheets (Taken 1/23/2023 1420)  Medication Review/Management:   medications reviewed   infusion initiated  Intervention: Promote Injury-Free Environment  Flowsheets (Taken 1/23/2023 1420)  Safety Promotion/Fall Prevention: instructed to call staff for mobility

## 2023-01-24 ENCOUNTER — OFFICE VISIT (OUTPATIENT)
Dept: GASTROENTEROLOGY | Facility: CLINIC | Age: 83
End: 2023-01-24
Payer: MEDICARE

## 2023-01-24 VITALS
HEART RATE: 78 BPM | SYSTOLIC BLOOD PRESSURE: 144 MMHG | DIASTOLIC BLOOD PRESSURE: 78 MMHG | HEIGHT: 63 IN | BODY MASS INDEX: 23.32 KG/M2 | WEIGHT: 131.63 LBS

## 2023-01-24 DIAGNOSIS — R19.7 INTERMITTENT DIARRHEA: ICD-10-CM

## 2023-01-24 DIAGNOSIS — K22.719 BARRETT'S ESOPHAGUS WITH DYSPLASIA: ICD-10-CM

## 2023-01-24 DIAGNOSIS — K59.09 CHRONIC CONSTIPATION WITH OVERFLOW: Primary | ICD-10-CM

## 2023-01-24 DIAGNOSIS — Z86.010 HISTORY OF COLON POLYPS: ICD-10-CM

## 2023-01-24 DIAGNOSIS — K57.90 DIVERTICULOSIS: ICD-10-CM

## 2023-01-24 DIAGNOSIS — R15.2 FECAL URGENCY: ICD-10-CM

## 2023-01-24 DIAGNOSIS — R19.4 CHANGE IN BOWEL HABITS: ICD-10-CM

## 2023-01-24 DIAGNOSIS — K21.9 GASTROESOPHAGEAL REFLUX DISEASE, UNSPECIFIED WHETHER ESOPHAGITIS PRESENT: ICD-10-CM

## 2023-01-24 DIAGNOSIS — R15.9 INCONTINENCE OF FECES, UNSPECIFIED FECAL INCONTINENCE TYPE: ICD-10-CM

## 2023-01-24 PROCEDURE — 1126F AMNT PAIN NOTED NONE PRSNT: CPT | Mod: HCNC,CPTII,S$GLB,

## 2023-01-24 PROCEDURE — 1160F RVW MEDS BY RX/DR IN RCRD: CPT | Mod: HCNC,CPTII,S$GLB,

## 2023-01-24 PROCEDURE — 1126F PR PAIN SEVERITY QUANTIFIED, NO PAIN PRESENT: ICD-10-PCS | Mod: HCNC,CPTII,S$GLB,

## 2023-01-24 PROCEDURE — 99204 PR OFFICE/OUTPT VISIT, NEW, LEVL IV, 45-59 MIN: ICD-10-PCS | Mod: HCNC,S$GLB,,

## 2023-01-24 PROCEDURE — 1159F MED LIST DOCD IN RCRD: CPT | Mod: HCNC,CPTII,S$GLB,

## 2023-01-24 PROCEDURE — 3077F SYST BP >= 140 MM HG: CPT | Mod: HCNC,CPTII,S$GLB,

## 2023-01-24 PROCEDURE — 3078F PR MOST RECENT DIASTOLIC BLOOD PRESSURE < 80 MM HG: ICD-10-PCS | Mod: HCNC,CPTII,S$GLB,

## 2023-01-24 PROCEDURE — 1160F PR REVIEW ALL MEDS BY PRESCRIBER/CLIN PHARMACIST DOCUMENTED: ICD-10-PCS | Mod: HCNC,CPTII,S$GLB,

## 2023-01-24 PROCEDURE — 99204 OFFICE O/P NEW MOD 45 MIN: CPT | Mod: HCNC,S$GLB,,

## 2023-01-24 PROCEDURE — 1159F PR MEDICATION LIST DOCUMENTED IN MEDICAL RECORD: ICD-10-PCS | Mod: HCNC,CPTII,S$GLB,

## 2023-01-24 PROCEDURE — 1157F PR ADVANCE CARE PLAN OR EQUIV PRESENT IN MEDICAL RECORD: ICD-10-PCS | Mod: HCNC,CPTII,S$GLB,

## 2023-01-24 PROCEDURE — 3078F DIAST BP <80 MM HG: CPT | Mod: HCNC,CPTII,S$GLB,

## 2023-01-24 PROCEDURE — 1157F ADVNC CARE PLAN IN RCRD: CPT | Mod: HCNC,CPTII,S$GLB,

## 2023-01-24 PROCEDURE — 99999 PR PBB SHADOW E&M-EST. PATIENT-LVL V: CPT | Mod: PBBFAC,HCNC,,

## 2023-01-24 PROCEDURE — 3077F PR MOST RECENT SYSTOLIC BLOOD PRESSURE >= 140 MM HG: ICD-10-PCS | Mod: HCNC,CPTII,S$GLB,

## 2023-01-24 PROCEDURE — 99999 PR PBB SHADOW E&M-EST. PATIENT-LVL V: ICD-10-PCS | Mod: PBBFAC,HCNC,,

## 2023-01-24 NOTE — PROGRESS NOTES
Subjective:       Patient ID: Alexa Otero is a 82 y.o. female Body mass index is 23.31 kg/m².    Chief Complaint: Diarrhea    This patient is new to me.  Referring Provider: Марина Lynch for diarrhea and incontinence of feces.  Established patient of Dr. Stephenson.     GI Problem  The primary symptoms include diarrhea. Primary symptoms do not include fever, weight loss, fatigue, abdominal pain, nausea, vomiting, melena, hematemesis, jaundice, hematochezia or dysuria.   The diarrhea began more than 1 week ago (chronic intermittent diarrhea that started 1 year ago; occurs 2 to 3 times a week has improve the past 2 weeks; long-term use of metformin). The diarrhea is watery (Rated stool 7 on [place] scale when diarrhea is present). Daily occurrences: Reports having multiple BMs daily when diarrhea is present. Risk factors for illness producing diarrhea include recent antibiotic use (Recently treated with antibiotics for sinus infection; denies suspect food traveling or change in medication).   The illness is also significant for constipation (Reports she has not had a BM for 3 days; intermittently strains; rated stool 1 on [place] scale at times). The illness does not include chills, anorexia, dysphagia, odynophagia or bloating. Associated symptoms comments: Also reports fecal urgency and fecal incontinence; fecal incontinent episodes occurs once every 5-6 weeks; history of Barretts esophagus; last EGD 06/20/18 - Normal upper third of esophagus and middle third of esophagus.  Z-line irregular, 38 cm from the incisors. Biopsied. Small hiatal hernia. A single gastroesophageal junction polyp. Resected and retrieved. Gastritis. Biopsied. Normal examined duodenum; patho: no evidence of active acute inflammation, negative for h. pylori. No evidence of intestinal metaplasia, atypia, or malignancy. Significant associated medical issues include GERD (Currently taking Prilosec 40 mg once daily; well-controlled). Associated  medical issues do not include inflammatory bowel disease, gallstones, liver disease, alcohol abuse, PUD, gastric bypass, bowel resection, irritable bowel syndrome, hemorrhoids or diverticulitis. Associated medical issues comments: hx of ovarian cancer treated with chemo - s/p hysterectomy and oophorectomy.     Review of Systems   Constitutional:  Negative for activity change, appetite change, chills, diaphoresis, fatigue, fever, unexpected weight change and weight loss.   HENT:  Negative for sore throat and trouble swallowing.    Respiratory:  Negative for cough, choking and shortness of breath.    Cardiovascular:  Negative for chest pain.   Gastrointestinal:  Positive for constipation (Reports she has not had a BM for 3 days; intermittently strains; rated stool 1 on [place] scale at times) and diarrhea. Negative for abdominal distention, abdominal pain, anal bleeding, anorexia, bloating, blood in stool, dysphagia, hematemesis, hematochezia, jaundice, melena, nausea, rectal pain and vomiting.   Genitourinary:  Negative for dysuria.       No LMP recorded. Patient has had a hysterectomy.  Past Medical History:   Diagnosis Date    Arthritis     Cataract     Colon polyp     Diabetes mellitus type II 2012    Encounter for blood transfusion     Extra-ovarian endometrioid adenocarcinoma 2020    GERD (gastroesophageal reflux disease)     Hyperlipidemia     Hypertension     Macular degeneration     PONV (postoperative nausea and vomiting)     Squamous cell carcinoma 1980's    precancer of cervix     Past Surgical History:   Procedure Laterality Date    AUGMENTATION OF BREAST      CHOLECYSTECTOMY      COLONOSCOPY      ESOPHAGOGASTRODUODENOSCOPY N/A 06/20/2018    Procedure: EGD (ESOPHAGOGASTRODUODENOSCOPY);  Surgeon: Sabino Stephenson MD;  Location: [facility];  Service: Endoscopy;  Laterality: N/A;    HYSTERECTOMY      partial    INSERTION OF TUNNELED CENTRAL VENOUS CATHETER (CVC) WITH SUBCUTANEOUS PORT Right 10/19/2020     Procedure: INSERTION, PORT-A-CATH;  Surgeon: Misti Mcfarland MD;  Location: [facility] OR;  Service: General;  Laterality: Right;    INTRAMEDULLARY RODDING OF TROCHANTER OF FEMUR Right 11/28/2021    Procedure: INSERTION, INTRAMEDULLARY LUC, FEMUR, TROCHANTER/RIGHT TFN DR FAJARDO NOTIFIED REP;  Surgeon: Kp Fajardo MD;  Location: [facility] OR;  Service: Orthopedics;  Laterality: Right;  SKIP    ROBOT-ASSISTED LAPAROSCOPIC LYMPHADENECTOMY USING DA NELLA XI N/A 09/21/2020    Procedure: XI ROBOTIC LYMPHADENECTOMY-pelvic and kell-aortic;  Surgeon: Altagracia Ray MD;  Location: [facility] OR;  Service: OB/GYN;  Laterality: N/A;    ROBOT-ASSISTED LAPAROSCOPIC OMENTECTOMY USING DA NELLA XI N/A 09/21/2020    Procedure: XI ROBOTIC OMENTECTOMY;  Surgeon: Altagracia Ray MD;  Location: [facility] OR;  Service: OB/GYN;  Laterality: N/A;    ROBOT-ASSISTED LAPAROSCOPIC SALPINGO-OOPHORECTOMY USING DA NELLA XI Bilateral 09/21/2020    Procedure: XI ROBOTIC SALPINGO-OOPHORECTOMY;  Surgeon: Altagracia Ray MD;  Location: [facility] OR;  Service: OB/GYN;  Laterality: Bilateral;    UPPER GASTROINTESTINAL ENDOSCOPY       Family History   Problem Relation Age of Onset    Cancer Mother 57        lung    Cancer Father 56        oral    Skin cancer Sister     Skin cancer Brother     Melanoma Brother     Skin cancer Brother     Breast cancer Maternal Grandmother     Breast cancer Paternal Grandmother     Colon polyps Daughter     Psoriasis Neg Hx     Lupus Neg Hx     Eczema Neg Hx     Colon cancer Neg Hx     Crohn's disease Neg Hx     Esophageal cancer Neg Hx     Stomach cancer Neg Hx     Ulcerative colitis Neg Hx      Social History     Tobacco Use    Smoking status: Former     Packs/day: 1.00     Years: 20.00     Pack years: 20.00     Types: Cigarettes    Smokeless tobacco: Never    Tobacco comments:     quit 40 yrs ago   Substance Use Topics    Alcohol use: Yes     Alcohol/week: 2.0 standard drinks     Types: 2 Glasses of wine per week      Comment: occasional    Drug use: No     Wt Readings from Last 10 Encounters:   01/24/23 59.7 kg (131 lb 9.8 oz)   01/23/23 59.5 kg (131 lb 3.2 oz)   01/06/23 58.2 kg (128 lb 4.9 oz)   12/22/22 59 kg (130 lb)   12/12/22 59 kg (130 lb 1.6 oz)   11/14/22 59.7 kg (131 lb 9.8 oz)   10/31/22 58.7 kg (129 lb 8 oz)   09/29/22 62.1 kg (137 lb)   09/01/22 62.1 kg (137 lb)   08/27/22 62.1 kg (137 lb)     Lab Results   Component Value Date    WBC 6.17 04/28/2022    HGB 11.5 (L) 04/28/2022    HCT 37.4 04/28/2022    MCV 92 04/28/2022     04/28/2022     CMP  Sodium   Date Value Ref Range Status   11/04/2022 143 136 - 145 mmol/L Final     Potassium   Date Value Ref Range Status   11/04/2022 4.0 3.5 - 5.1 mmol/L Final     Chloride   Date Value Ref Range Status   11/04/2022 106 95 - 110 mmol/L Final     CO2   Date Value Ref Range Status   11/04/2022 25 23 - 29 mmol/L Final     Glucose   Date Value Ref Range Status   11/04/2022 102 70 - 110 mg/dL Final     BUN   Date Value Ref Range Status   11/04/2022 30 (H) 8 - 23 mg/dL Final     Creatinine   Date Value Ref Range Status   11/04/2022 1.1 0.5 - 1.4 mg/dL Final     Calcium   Date Value Ref Range Status   11/04/2022 10.0 8.7 - 10.5 mg/dL Final     Total Protein   Date Value Ref Range Status   11/04/2022 7.1 6.0 - 8.4 g/dL Final     Albumin   Date Value Ref Range Status   11/04/2022 4.0 3.5 - 5.2 g/dL Final     Total Bilirubin   Date Value Ref Range Status   11/04/2022 0.4 0.1 - 1.0 mg/dL Final     Comment:     For infants and newborns, interpretation of results should be based  on gestational age, weight and in agreement with clinical  observations.    Premature Infant recommended reference ranges:  Up to 24 hours.............<8.0 mg/dL  Up to 48 hours............<12.0 mg/dL  3-5 days..................<15.0 mg/dL  6-29 days.................<15.0 mg/dL       Alkaline Phosphatase   Date Value Ref Range Status   11/04/2022 52 (L) 55 - 135 U/L Final     AST   Date Value Ref Range  Status   11/04/2022 15 10 - 40 U/L Final     ALT   Date Value Ref Range Status   11/04/2022 10 10 - 44 U/L Final     Anion Gap   Date Value Ref Range Status   11/04/2022 12 8 - 16 mmol/L Final     eGFR if    Date Value Ref Range Status   04/28/2022 40.6 (A) >60 mL/min/1.73 m^2 Final     eGFR if non    Date Value Ref Range Status   04/28/2022 35.3 (A) >60 mL/min/1.73 m^2 Final     Comment:     Calculation used to obtain the estimated glomerular filtration  rate (eGFR) is the CKD-EPI equation.        Lab Results   Component Value Date    AMYLASE 73 06/23/2016     Lab Results   Component Value Date    LIPASE 51 06/23/2016     Lab Results   Component Value Date    TSH 0.721 03/17/2016       Reviewed prior medical records including radiology report of chest x-ray 11/14/2022, abdomen pelvis CT 05/26/2020, MRI abdomen 05/16/2016 & endoscopy history (see surgical history).    Objective:      Physical Exam  Vitals and nursing note reviewed.   Constitutional:       General: She is not in acute distress.     Appearance: Normal appearance. She is normal weight. She is not ill-appearing.   HENT:      Mouth/Throat:      Lips: Pink. No lesions.   Cardiovascular:      Rate and Rhythm: Normal rate and regular rhythm.      Pulses: Normal pulses.   Pulmonary:      Effort: Pulmonary effort is normal. No respiratory distress.   Abdominal:      General: Abdomen is flat. Bowel sounds are normal. There is no distension or abdominal bruit. There are no signs of injury.      Palpations: Abdomen is soft. There is no shifting dullness, fluid wave, hepatomegaly, splenomegaly or mass.      Tenderness: There is no abdominal tenderness. There is no guarding or rebound. Negative signs include Diaz's sign, Rovsing's sign and McBurney's sign.      Hernia: No hernia is present.   Skin:     General: Skin is warm and dry.      Coloration: Skin is not jaundiced or pale.   Neurological:      Mental Status: She is alert  and oriented to person, place, and time.   Psychiatric:         Attention and Perception: Attention normal.         Mood and Affect: Mood normal.         Speech: Speech normal.         Behavior: Behavior normal.       Assessment:       1. Chronic constipation with overflow    2. Intermittent diarrhea    3. Change in bowel habits    4. Fecal urgency    5. Incontinence of feces, unspecified fecal incontinence type    6. Diverticulosis    7. History of colon polyps    8. Kwon's esophagus with dysplasia    9. Gastroesophageal reflux disease, unspecified whether esophagitis present        Plan:       Chronic constipation with overflow, Intermittent diarrhea, & Change in bowel habits  - schedule Colonoscopy, discussed procedure with the patient, including risks and benefits, patient verbalized understanding  -Recommend high fiber diet (20-30 grams of fiber daily)/OTC fiber supplements daily as directed.  -     WBC, Stool; Future; Expected date: 01/24/2023  -     Rotavirus antigen, stool; Future; Expected date: 01/24/2023  -     Adenovirus Molecular Detection, PCR, Non-Blood Stool; Future; Expected date: 01/24/2023  -     Giardia / Cryptosporidum, EIA; Future; Expected date: 01/24/2023  -     Stool Exam-Ova,Cysts,Parasites; Future; Expected date: 01/24/2023  -     Clostridium difficile EIA; Future; Expected date: 01/24/2023  -     Stool culture; Future; Expected date: 01/24/2023  -     X-Ray Abdomen AP 1 View; Future; Expected date: 01/24/2023  -     TSH; Future; Expected date: 01/24/2023  -     Case Request Endoscopy: COLONOSCOPY    Fecal urgency & Incontinence of feces, unspecified fecal incontinence type  - schedule Colonoscopy, discussed procedure with the patient, including risks and benefits, patient verbalized understanding  - Recommended increase fiber in diet, especially soluble fiber since this can help bulk up the stool consistency and may help to slow down how fast the stool goes through the colon and can  prevent diarrhea  -     Case Request Endoscopy: COLONOSCOPY    Diverticulosis  - schedule Colonoscopy, discussed procedure with the patient, including risks and benefits, patient verbalized understanding  -Recommend high fiber diet (20-30 grams of fiber daily)/OTC fiber supplements daily as directed, such as Benefiber or Metamucil.    History of colon polyps  - schedule Colonoscopy, discussed procedure with the patient, including risks and benefits, patient verbalized understanding    Kwon's esophagus with dysplasia  - schedule EGD, discussed procedure with patient, including risks and benefits, patient verbalized understanding  - discussed diagnosis patient and the need for long term reflux medication and surveillance EGDs   -Continue Prilosec 40 mg once daily 30 minutes to an hour before breakfast    Gastroesophageal reflux disease, unspecified whether esophagitis present   - schedule EGD, discussed procedure with patient, including risks and benefits, patient verbalized understanding  - avoid large meals, avoid eating within 2-3 hours of bedtime (avoid late night eating & lying down soon after eating), elevate head of bed if nocturnal symptoms are present, smoking cessation (if current smoker), & weight loss (if overweight).   -avoid known foods which trigger reflux symptoms & to minimize/avoid high-fat foods, chocolate, caffeine, citrus, alcohol, & tomato products.  -avoid/limit use of NSAID's, since they can cause GI upset, bleeding, and/or ulcers. If needed, take with food.   -Continue Prilosec 40 mg once daily 30 minutes to an hour before breakfast    Follow up in about 4 weeks (around 2/21/2023), or if symptoms worsen or fail to improve.      If no improvement in symptoms or symptoms worsen, call/follow-up at clinic or go to ER.        45 minutes of total time spent on the encounter, which includes face to face time and non-face to face time preparing to see the patient (eg, review of tests), Obtaining  and/or reviewing separately obtained history, Documenting clinical information in the electronic or other health record, Independently interpreting results (not separately reported) and communicating results to the patient/family/caregiver, or Care coordination (not separately reported).     A dictation software program was used for this note. Please expect some simple typographical  errors in this note.

## 2023-01-25 ENCOUNTER — LAB VISIT (OUTPATIENT)
Dept: LAB | Facility: HOSPITAL | Age: 83
End: 2023-01-25
Payer: MEDICARE

## 2023-01-25 DIAGNOSIS — R19.7 INTERMITTENT DIARRHEA: ICD-10-CM

## 2023-01-25 DIAGNOSIS — K59.09 CHRONIC CONSTIPATION WITH OVERFLOW: ICD-10-CM

## 2023-01-25 LAB
C DIFF GDH STL QL: NEGATIVE
C DIFF TOX A+B STL QL IA: NEGATIVE

## 2023-01-25 PROCEDURE — 87324 CLOSTRIDIUM AG IA: CPT | Mod: HCNC

## 2023-01-25 PROCEDURE — 87046 STOOL CULTR AEROBIC BACT EA: CPT | Mod: HCNC

## 2023-01-25 PROCEDURE — 87329 GIARDIA AG IA: CPT | Mod: HCNC

## 2023-01-25 PROCEDURE — 87045 FECES CULTURE AEROBIC BACT: CPT | Mod: HCNC

## 2023-01-25 PROCEDURE — 87177 OVA AND PARASITES SMEARS: CPT | Mod: HCNC

## 2023-01-25 PROCEDURE — 87425 ROTAVIRUS AG IA: CPT | Mod: HCNC

## 2023-01-25 PROCEDURE — 87427 SHIGA-LIKE TOXIN AG IA: CPT | Mod: HCNC

## 2023-01-25 PROCEDURE — 87798 DETECT AGENT NOS DNA AMP: CPT | Mod: HCNC

## 2023-01-25 PROCEDURE — 89055 LEUKOCYTE ASSESSMENT FECAL: CPT | Mod: HCNC

## 2023-01-25 PROCEDURE — 87209 SMEAR COMPLEX STAIN: CPT | Mod: HCNC

## 2023-01-26 ENCOUNTER — HOSPITAL ENCOUNTER (OUTPATIENT)
Facility: AMBULARY SURGERY CENTER | Age: 83
Discharge: HOME | End: 2023-01-26
Attending: ANESTHESIOLOGY | Admitting: ANESTHESIOLOGY
Payer: MEDICARE

## 2023-01-26 DIAGNOSIS — M53.3 SACROILIAC JOINT PAIN: Primary | ICD-10-CM

## 2023-01-26 LAB
CRYPTOSP AG STL QL IA: NEGATIVE
E COLI SXT1 STL QL IA: NEGATIVE
E COLI SXT2 STL QL IA: NEGATIVE
G LAMBLIA AG STL QL IA: NEGATIVE
POCT GLUCOSE: 108 MG/DL (ref 70–110)
RV AG STL QL IA.RAPID: NEGATIVE
WBC #/AREA STL HPF: NORMAL /[HPF]

## 2023-01-26 PROCEDURE — 27096 PR INJECTION,SACROILIAC JOINT: ICD-10-PCS | Mod: HCNC,RT,, | Performed by: ANESTHESIOLOGY

## 2023-01-26 PROCEDURE — 27096 INJECT SACROILIAC JOINT: CPT | Mod: HCNC,RT,, | Performed by: ANESTHESIOLOGY

## 2023-01-26 PROCEDURE — 27096 INJECT SACROILIAC JOINT: CPT | Mod: RT | Performed by: ANESTHESIOLOGY

## 2023-01-26 RX ORDER — METHYLPREDNISOLONE ACETATE 80 MG/ML
INJECTION, SUSPENSION INTRA-ARTICULAR; INTRALESIONAL; INTRAMUSCULAR; SOFT TISSUE
Status: DISCONTINUED | OUTPATIENT
Start: 2023-01-26 | End: 2023-01-26 | Stop reason: HOSPADM

## 2023-01-26 RX ORDER — LIDOCAINE HYDROCHLORIDE 10 MG/ML
INJECTION, SOLUTION EPIDURAL; INFILTRATION; INTRACAUDAL; PERINEURAL
Status: DISCONTINUED | OUTPATIENT
Start: 2023-01-26 | End: 2023-01-26 | Stop reason: HOSPADM

## 2023-01-26 RX ORDER — MIDAZOLAM HYDROCHLORIDE 1 MG/ML
INJECTION, SOLUTION INTRAMUSCULAR; INTRAVENOUS
Status: DISCONTINUED | OUTPATIENT
Start: 2023-01-26 | End: 2023-01-26 | Stop reason: HOSPADM

## 2023-01-26 RX ORDER — FENTANYL CITRATE 50 UG/ML
INJECTION, SOLUTION INTRAMUSCULAR; INTRAVENOUS
Status: DISCONTINUED | OUTPATIENT
Start: 2023-01-26 | End: 2023-01-26 | Stop reason: HOSPADM

## 2023-01-26 RX ORDER — BUPIVACAINE HYDROCHLORIDE 2.5 MG/ML
INJECTION, SOLUTION EPIDURAL; INFILTRATION; INTRACAUDAL
Status: DISCONTINUED | OUTPATIENT
Start: 2023-01-26 | End: 2023-01-26 | Stop reason: HOSPADM

## 2023-01-26 RX ORDER — SODIUM CHLORIDE, SODIUM LACTATE, POTASSIUM CHLORIDE, CALCIUM CHLORIDE 600; 310; 30; 20 MG/100ML; MG/100ML; MG/100ML; MG/100ML
INJECTION, SOLUTION INTRAVENOUS ONCE AS NEEDED
Status: COMPLETED | OUTPATIENT
Start: 2023-01-26 | End: 2023-01-26

## 2023-01-26 RX ADMIN — SODIUM CHLORIDE, SODIUM LACTATE, POTASSIUM CHLORIDE, CALCIUM CHLORIDE: 600; 310; 30; 20 INJECTION, SOLUTION INTRAVENOUS at 01:01

## 2023-01-26 NOTE — DISCHARGE SUMMARY
[facility]  Discharge Note  Short Stay    Procedure(s) (LRB):  INJECTION,SACROILIAC JOINT (Right)      OUTCOME: Patient tolerated treatment/procedure well without complication and is now ready for discharge.    DISPOSITION: Home or Self Care    FINAL DIAGNOSIS:  <principal problem not specified>    FOLLOWUP: In clinic    DISCHARGE INSTRUCTIONS:    Discharge Procedure Orders   Notify your health care provider if you experience any of the following:  temperature >100.4     Notify your health care provider if you experience any of the following:  severe uncontrolled pain     Notify your health care provider if you experience any of the following:  redness, tenderness, or signs of infection (pain, swelling, redness, odor or green/yellow discharge around incision site)     Activity as tolerated        TIME SPENT ON DISCHARGE: 30 minutes

## 2023-01-26 NOTE — PLAN OF CARE
Patient is awake alert and says she is ready to go home; her spouse says he is ready to go home and he is driving patient home per private vehicle. Patient's vital signs and injection site area stable. Patient denies apin, nausea weakness or dizziness. All patient belongings have been returned to patient. Patient is in stable condition and being discharged ambulatory with standby nurse assist to private vehicle her spouse is driving.

## 2023-01-26 NOTE — OP NOTE
Procedure Date: 1/26/2023    PROCEDURE:  Right sacroiliac joint injection utilizing fluoroscopy.    DIAGNOSIS: RIght sided sacroiliitis.  Post op diagnosis: same    PHYSICIAN: Agustin Robles MD    MEDICATIONS INJECTED:  Methylprednisone 80mg and 1.5ml Bupivacaine 0.25%.    LOCAL ANESTHETIC USED: Lidocaine 1%,2ml total.     SEDATION MEDICATIONS: RN IV sedation    ESTIMATED BLOOD LOSS: None    COMPLICATIONS: NOne    TECHNIQUE:   Time-out taken to identify patient and procedure side prior to starting the procedure. After placing the patient in the prone position, the patient was prepped and draped in the usual sterile fashion using ChloraPrep and sterile towels.  The area was determined under fluoroscopy in the AP view.  Lidocaine was injected by raising a wheal and going down to the periosteum using a 25-gauge 1.5 inch needle.  The 3.5 inch 22-gauge spinal needle was introduced into inferior opening of the right sacroiliac joint.  Negative pressure applied to confirm no intravascular placement.  0.5 ml of contrast dye was injected to confirm placement and to confirm that there was no vascular uptake. The medication was then injected slowly. The patient tolerated the procedure well.    The patient was monitored after the procedure.  The patient was given post procedure and discharge instructions to follow at home. The patient was discharged in a stable condition

## 2023-01-27 VITALS
HEIGHT: 63 IN | WEIGHT: 130 LBS | HEART RATE: 88 BPM | DIASTOLIC BLOOD PRESSURE: 75 MMHG | RESPIRATION RATE: 18 BRPM | BODY MASS INDEX: 23.04 KG/M2 | OXYGEN SATURATION: 98 % | TEMPERATURE: 98 F | SYSTOLIC BLOOD PRESSURE: 166 MMHG

## 2023-01-28 LAB
BACTERIA STL CULT: NORMAL
HADV DNA SERPL QL NAA+PROBE: NEGATIVE
SPECIMEN SOURCE: NORMAL

## 2023-01-31 LAB — O+P STL MICRO: NORMAL

## 2023-02-02 ENCOUNTER — PATIENT MESSAGE (OUTPATIENT)
Dept: FAMILY MEDICINE | Facility: CLINIC | Age: 83
End: 2023-02-02
Payer: MEDICARE

## 2023-02-12 ENCOUNTER — PATIENT MESSAGE (OUTPATIENT)
Dept: FAMILY MEDICINE | Facility: CLINIC | Age: 83
End: 2023-02-12
Payer: MEDICARE

## 2023-02-13 ENCOUNTER — PATIENT MESSAGE (OUTPATIENT)
Dept: FAMILY MEDICINE | Facility: CLINIC | Age: 83
End: 2023-02-13
Payer: MEDICARE

## 2023-02-14 ENCOUNTER — HOSPITAL ENCOUNTER (OUTPATIENT)
Dept: RADIOLOGY | Facility: HOSPITAL | Age: 83
Discharge: HOME | End: 2023-02-14
Attending: FAMILY MEDICINE
Payer: MEDICARE

## 2023-02-14 ENCOUNTER — OFFICE VISIT (OUTPATIENT)
Dept: FAMILY MEDICINE | Facility: CLINIC | Age: 83
End: 2023-02-14
Payer: MEDICARE

## 2023-02-14 VITALS
RESPIRATION RATE: 16 BRPM | HEIGHT: 63 IN | BODY MASS INDEX: 23.04 KG/M2 | SYSTOLIC BLOOD PRESSURE: 120 MMHG | DIASTOLIC BLOOD PRESSURE: 70 MMHG | OXYGEN SATURATION: 99 % | TEMPERATURE: 98 F | WEIGHT: 130.06 LBS | HEART RATE: 88 BPM

## 2023-02-14 DIAGNOSIS — R09.89 RALES 1/4 WAY UP POSTERIOR CHEST WALL ON RIGHT SIDE: ICD-10-CM

## 2023-02-14 DIAGNOSIS — R05.3 CHRONIC COUGH: ICD-10-CM

## 2023-02-14 DIAGNOSIS — I15.2 HYPERTENSION ASSOCIATED WITH DIABETES: ICD-10-CM

## 2023-02-14 DIAGNOSIS — R05.3 CHRONIC COUGH: Primary | ICD-10-CM

## 2023-02-14 DIAGNOSIS — C56.1 CARCINOMA OF RIGHT OVARY: ICD-10-CM

## 2023-02-14 DIAGNOSIS — E11.59 HYPERTENSION ASSOCIATED WITH DIABETES: ICD-10-CM

## 2023-02-14 PROCEDURE — 1159F MED LIST DOCD IN RCRD: CPT | Mod: HCNC,CPTII,S$GLB, | Performed by: FAMILY MEDICINE

## 2023-02-14 PROCEDURE — 3074F SYST BP LT 130 MM HG: CPT | Mod: HCNC,CPTII,S$GLB, | Performed by: FAMILY MEDICINE

## 2023-02-14 PROCEDURE — 1160F PR REVIEW ALL MEDS BY PRESCRIBER/CLIN PHARMACIST DOCUMENTED: ICD-10-PCS | Mod: HCNC,CPTII,S$GLB, | Performed by: FAMILY MEDICINE

## 2023-02-14 PROCEDURE — 3074F PR MOST RECENT SYSTOLIC BLOOD PRESSURE < 130 MM HG: ICD-10-PCS | Mod: HCNC,CPTII,S$GLB, | Performed by: FAMILY MEDICINE

## 2023-02-14 PROCEDURE — 1101F PT FALLS ASSESS-DOCD LE1/YR: CPT | Mod: HCNC,CPTII,S$GLB, | Performed by: FAMILY MEDICINE

## 2023-02-14 PROCEDURE — 1126F PR PAIN SEVERITY QUANTIFIED, NO PAIN PRESENT: ICD-10-PCS | Mod: HCNC,CPTII,S$GLB, | Performed by: FAMILY MEDICINE

## 2023-02-14 PROCEDURE — 99999 PR PBB SHADOW E&M-EST. PATIENT-LVL III: ICD-10-PCS | Mod: PBBFAC,HCNC,, | Performed by: FAMILY MEDICINE

## 2023-02-14 PROCEDURE — 3078F PR MOST RECENT DIASTOLIC BLOOD PRESSURE < 80 MM HG: ICD-10-PCS | Mod: HCNC,CPTII,S$GLB, | Performed by: FAMILY MEDICINE

## 2023-02-14 PROCEDURE — 1160F RVW MEDS BY RX/DR IN RCRD: CPT | Mod: HCNC,CPTII,S$GLB, | Performed by: FAMILY MEDICINE

## 2023-02-14 PROCEDURE — 71250 CT THORAX DX C-: CPT | Mod: 26,HCNC,, | Performed by: RADIOLOGY

## 2023-02-14 PROCEDURE — 1126F AMNT PAIN NOTED NONE PRSNT: CPT | Mod: HCNC,CPTII,S$GLB, | Performed by: FAMILY MEDICINE

## 2023-02-14 PROCEDURE — 3288F FALL RISK ASSESSMENT DOCD: CPT | Mod: HCNC,CPTII,S$GLB, | Performed by: FAMILY MEDICINE

## 2023-02-14 PROCEDURE — 99999 PR PBB SHADOW E&M-EST. PATIENT-LVL III: CPT | Mod: PBBFAC,HCNC,, | Performed by: FAMILY MEDICINE

## 2023-02-14 PROCEDURE — 99214 PR OFFICE/OUTPT VISIT, EST, LEVL IV, 30-39 MIN: ICD-10-PCS | Mod: HCNC,S$GLB,, | Performed by: FAMILY MEDICINE

## 2023-02-14 PROCEDURE — 1157F ADVNC CARE PLAN IN RCRD: CPT | Mod: HCNC,CPTII,S$GLB, | Performed by: FAMILY MEDICINE

## 2023-02-14 PROCEDURE — 3078F DIAST BP <80 MM HG: CPT | Mod: HCNC,CPTII,S$GLB, | Performed by: FAMILY MEDICINE

## 2023-02-14 PROCEDURE — 1157F PR ADVANCE CARE PLAN OR EQUIV PRESENT IN MEDICAL RECORD: ICD-10-PCS | Mod: HCNC,CPTII,S$GLB, | Performed by: FAMILY MEDICINE

## 2023-02-14 PROCEDURE — 3288F PR FALLS RISK ASSESSMENT DOCUMENTED: ICD-10-PCS | Mod: HCNC,CPTII,S$GLB, | Performed by: FAMILY MEDICINE

## 2023-02-14 PROCEDURE — 99214 OFFICE O/P EST MOD 30 MIN: CPT | Mod: HCNC,S$GLB,, | Performed by: FAMILY MEDICINE

## 2023-02-14 PROCEDURE — 1159F PR MEDICATION LIST DOCUMENTED IN MEDICAL RECORD: ICD-10-PCS | Mod: HCNC,CPTII,S$GLB, | Performed by: FAMILY MEDICINE

## 2023-02-14 PROCEDURE — 1101F PR PT FALLS ASSESS DOC 0-1 FALLS W/OUT INJ PAST YR: ICD-10-PCS | Mod: HCNC,CPTII,S$GLB, | Performed by: FAMILY MEDICINE

## 2023-02-14 PROCEDURE — 71250 CT THORAX DX C-: CPT | Mod: TC,HCNC

## 2023-02-14 PROCEDURE — 71250 CT CHEST WITHOUT CONTRAST: ICD-10-PCS | Mod: 26,HCNC,, | Performed by: RADIOLOGY

## 2023-02-14 NOTE — PROGRESS NOTES
"Subjective:       Patient ID: Alexa Otero is a 83 y.o. female.    Chief Complaint: No chief complaint on file.    New to me patient here for UC visit.  Yet, her CC is a cough x 8 months - deep; produces "a lot" of clear, white mucous.  Denies fever, SOB, pl pain (although after exam she does relate feeling some pin in right low post chest at times) and no hemoptysis.  No hx of allergies; has rare symptoms of GERD.  On Benazapril x many years; Hx of Ovarian CA.    Gamal and Rx'ed x 4 over the months with no change.    CXR COMPARISON:  11/27/2021     FINDINGS:  Heart size and pulmonary vessels are normal.  A vascular access catheter enters from a right subclavian approach with the tip in the superior vena cava.  The lungs are clear.  No pleural effusion or pneumothorax are identified.  Skeletal structures are intact.  Bilateral breast implants are again noted.   Impression:   No radiographic evidence of active chest disease.      Electronically signed by: Andrew Rodriguez MD  Date:                                            11/14/2022  Review of Systems   Constitutional:  Negative for fever.   Respiratory:  Positive for cough. Negative for shortness of breath.    Cardiovascular:  Negative for chest pain.   Gastrointestinal:  Negative for abdominal pain and nausea.   Skin:  Negative for rash.   All other systems reviewed and are negative.    Objective:      Physical Exam  Constitutional:       Appearance: She is well-developed. She is not ill-appearing.   Eyes:      General: No scleral icterus.     Pupils: Pupils are equal, round, and reactive to light.   Cardiovascular:      Rate and Rhythm: Normal rate and regular rhythm.      Heart sounds: No murmur heard.  Pulmonary:      Effort: Pulmonary effort is normal.      Breath sounds: Examination of the right-lower field reveals rhonchi and rales. Rhonchi and rales present.   Musculoskeletal:         General: No tenderness.      Cervical back: Neck supple.      Right " lower leg: No edema.      Left lower leg: No edema.   Lymphadenopathy:      Cervical: No cervical adenopathy.   Skin:     General: Skin is warm and dry.   Neurological:      Mental Status: She is alert.       Assessment:       1. Chronic cough    2. Rales 1/4 way up posterior chest wall on right side    3. 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

## 2023-02-15 DIAGNOSIS — J18.9 PNEUMONIA OF RIGHT LOWER LOBE DUE TO INFECTIOUS ORGANISM: Primary | ICD-10-CM

## 2023-02-15 RX ORDER — ALBUTEROL SULFATE 90 UG/1
2 AEROSOL, METERED RESPIRATORY (INHALATION) 4 TIMES DAILY
Qty: 18 G | Refills: 0 | Status: SHIPPED | OUTPATIENT
Start: 2023-02-15 | End: 2023-02-15

## 2023-02-15 RX ORDER — LEVOFLOXACIN 500 MG/1
500 TABLET, FILM COATED ORAL DAILY
Qty: 10 TABLET | Refills: 0 | Status: ON HOLD | OUTPATIENT
Start: 2023-02-15 | End: 2023-03-21 | Stop reason: HOSPADM

## 2023-02-16 ENCOUNTER — TELEPHONE (OUTPATIENT)
Dept: FAMILY MEDICINE | Facility: CLINIC | Age: 83
End: 2023-02-16
Payer: MEDICARE

## 2023-02-16 NOTE — TELEPHONE ENCOUNTER
----- Message from Veronica Molina, Patient Care Assistant sent at 2/16/2023 10:12 AM CST -----  Contact: self  Type:  Test Results    Who Called:  self  Name of Test (Lab/Mammo/Etc):  CT   Date of Test:  2/14  Ordering Provider:  Dr Deshpande  Where the test was performed:  [email or web address]  Best Call Back Number:  [phone]  Additional Information:  thanks

## 2023-02-16 NOTE — TELEPHONE ENCOUNTER
----- Message from Andrew Deshpande MD sent at 2/15/2023  8:32 AM CST -----  CT shows RLL pneumonia; need her to get a Sputum for culture (ordered) I will Rx an strong oral antibiotic and Albuterol Inhaler to use QID.  F/u in 7-10 days with PCP/Team.

## 2023-02-17 ENCOUNTER — LAB VISIT (OUTPATIENT)
Dept: LAB | Facility: HOSPITAL | Age: 83
End: 2023-02-17
Attending: FAMILY MEDICINE
Payer: MEDICARE

## 2023-02-17 DIAGNOSIS — J18.9 PNEUMONIA OF RIGHT LOWER LOBE DUE TO INFECTIOUS ORGANISM: ICD-10-CM

## 2023-02-17 PROCEDURE — 87205 SMEAR GRAM STAIN: CPT | Mod: HCNC | Performed by: FAMILY MEDICINE

## 2023-02-17 PROCEDURE — 87070 CULTURE OTHR SPECIMN AEROBIC: CPT | Mod: HCNC | Performed by: FAMILY MEDICINE

## 2023-02-20 LAB
BACTERIA SPEC AEROBE CULT: NORMAL
BACTERIA SPEC AEROBE CULT: NORMAL
GRAM STN SPEC: NORMAL

## 2023-02-22 ENCOUNTER — LAB VISIT (OUTPATIENT)
Dept: LAB | Facility: HOSPITAL | Age: 83
End: 2023-02-22
Attending: OBSTETRICS & GYNECOLOGY
Payer: MEDICARE

## 2023-02-22 DIAGNOSIS — C56.1 MALIGNANT NEOPLASM OF RIGHT OVARY: Primary | ICD-10-CM

## 2023-02-22 PROCEDURE — 36415 COLL VENOUS BLD VENIPUNCTURE: CPT | Performed by: OBSTETRICS & GYNECOLOGY

## 2023-02-22 PROCEDURE — 86304 IMMUNOASSAY TUMOR CA 125: CPT | Performed by: OBSTETRICS & GYNECOLOGY

## 2023-02-23 ENCOUNTER — OFFICE VISIT (OUTPATIENT)
Dept: PAIN MEDICINE | Facility: CLINIC | Age: 83
End: 2023-02-23
Payer: MEDICARE

## 2023-02-23 ENCOUNTER — TELEPHONE (OUTPATIENT)
Dept: PAIN MEDICINE | Facility: CLINIC | Age: 83
End: 2023-02-23

## 2023-02-23 VITALS
DIASTOLIC BLOOD PRESSURE: 69 MMHG | SYSTOLIC BLOOD PRESSURE: 104 MMHG | BODY MASS INDEX: 23.04 KG/M2 | HEART RATE: 99 BPM | WEIGHT: 130 LBS | HEIGHT: 63 IN

## 2023-02-23 DIAGNOSIS — S72.141D CLOSED FRACTURE OF FEMUR, INTERTROCHANTERIC, RIGHT, WITH ROUTINE HEALING, SUBSEQUENT ENCOUNTER: ICD-10-CM

## 2023-02-23 DIAGNOSIS — M25.551 RIGHT HIP PAIN: ICD-10-CM

## 2023-02-23 DIAGNOSIS — M46.1 SACROILIITIS: Primary | ICD-10-CM

## 2023-02-23 DIAGNOSIS — M47.896 OTHER SPONDYLOSIS, LUMBAR REGION: Primary | ICD-10-CM

## 2023-02-23 DIAGNOSIS — M47.896 OTHER SPONDYLOSIS, LUMBAR REGION: ICD-10-CM

## 2023-02-23 PROCEDURE — 3074F SYST BP LT 130 MM HG: CPT | Mod: HCNC,CPTII,S$GLB, | Performed by: ANESTHESIOLOGY

## 2023-02-23 PROCEDURE — 99214 OFFICE O/P EST MOD 30 MIN: CPT | Mod: HCNC,S$GLB,, | Performed by: ANESTHESIOLOGY

## 2023-02-23 PROCEDURE — 1101F PR PT FALLS ASSESS DOC 0-1 FALLS W/OUT INJ PAST YR: ICD-10-PCS | Mod: HCNC,CPTII,S$GLB, | Performed by: ANESTHESIOLOGY

## 2023-02-23 PROCEDURE — 1125F AMNT PAIN NOTED PAIN PRSNT: CPT | Mod: HCNC,CPTII,S$GLB, | Performed by: ANESTHESIOLOGY

## 2023-02-23 PROCEDURE — 3078F PR MOST RECENT DIASTOLIC BLOOD PRESSURE < 80 MM HG: ICD-10-PCS | Mod: HCNC,CPTII,S$GLB, | Performed by: ANESTHESIOLOGY

## 2023-02-23 PROCEDURE — 1159F PR MEDICATION LIST DOCUMENTED IN MEDICAL RECORD: ICD-10-PCS | Mod: HCNC,CPTII,S$GLB, | Performed by: ANESTHESIOLOGY

## 2023-02-23 PROCEDURE — 1101F PT FALLS ASSESS-DOCD LE1/YR: CPT | Mod: HCNC,CPTII,S$GLB, | Performed by: ANESTHESIOLOGY

## 2023-02-23 PROCEDURE — 3078F DIAST BP <80 MM HG: CPT | Mod: HCNC,CPTII,S$GLB, | Performed by: ANESTHESIOLOGY

## 2023-02-23 PROCEDURE — 1125F PR PAIN SEVERITY QUANTIFIED, PAIN PRESENT: ICD-10-PCS | Mod: HCNC,CPTII,S$GLB, | Performed by: ANESTHESIOLOGY

## 2023-02-23 PROCEDURE — 99999 PR PBB SHADOW E&M-EST. PATIENT-LVL III: CPT | Mod: PBBFAC,HCNC,, | Performed by: ANESTHESIOLOGY

## 2023-02-23 PROCEDURE — 99214 PR OFFICE/OUTPT VISIT, EST, LEVL IV, 30-39 MIN: ICD-10-PCS | Mod: HCNC,S$GLB,, | Performed by: ANESTHESIOLOGY

## 2023-02-23 PROCEDURE — 99999 PR PBB SHADOW E&M-EST. PATIENT-LVL III: ICD-10-PCS | Mod: PBBFAC,HCNC,, | Performed by: ANESTHESIOLOGY

## 2023-02-23 PROCEDURE — 3288F FALL RISK ASSESSMENT DOCD: CPT | Mod: HCNC,CPTII,S$GLB, | Performed by: ANESTHESIOLOGY

## 2023-02-23 PROCEDURE — 3074F PR MOST RECENT SYSTOLIC BLOOD PRESSURE < 130 MM HG: ICD-10-PCS | Mod: HCNC,CPTII,S$GLB, | Performed by: ANESTHESIOLOGY

## 2023-02-23 PROCEDURE — 1159F MED LIST DOCD IN RCRD: CPT | Mod: HCNC,CPTII,S$GLB, | Performed by: ANESTHESIOLOGY

## 2023-02-23 PROCEDURE — 1157F ADVNC CARE PLAN IN RCRD: CPT | Mod: HCNC,CPTII,S$GLB, | Performed by: ANESTHESIOLOGY

## 2023-02-23 PROCEDURE — 3288F PR FALLS RISK ASSESSMENT DOCUMENTED: ICD-10-PCS | Mod: HCNC,CPTII,S$GLB, | Performed by: ANESTHESIOLOGY

## 2023-02-23 PROCEDURE — 1157F PR ADVANCE CARE PLAN OR EQUIV PRESENT IN MEDICAL RECORD: ICD-10-PCS | Mod: HCNC,CPTII,S$GLB, | Performed by: ANESTHESIOLOGY

## 2023-02-23 NOTE — PROGRESS NOTES
This note was completed with dictation software and grammatical errors may exist.    Referring Physician: No ref. provider found    PCP: Idalia Greco MD      CC: right buttock pain    Interval history  Alexa Otero is a 83 y.o. female referred to us for right buttock pain.  Patient has significant history of ORIF of the right hip November 2021.  She did well postoperatively.  She does have residual right groin pain.  She presents to us with intermittent throbbing, grabbing, tight pain over her right buttock.  Pain radiates down her right leg at times.  Pain worsens with bending, lying, getting up.  Pain improves with rest.  She has undergone physical therapy over the past 3 months with mild benefits.  She has been evaluated by orthopedics.  She underwent right SI joint injection recently.  She states it provided over 60% relief for 2 weeks.  Denies any worsening weakness.  No bowel bladder changes. Pain is rated 8/10 today.     PRIOR HPI:   Alexa Otero is a 82 y.o. female established patient, new to me, for the management of right hip and buttocks pain.  Patient has significant history of ORIF of the right hip November 2021.  The patient presents today with right sided hip pain that radiates down her right buttocks and the anterior portion of her RLE above her knee.  The patient reports pain has recently began to increase. The patient was evaluated by ortho MD yesterday who advised the patient to follow up with Pain management for an injections. She does have residual right groin pain.  She presents to us with intermittent throbbing, grabbing, tight pain over her right buttock.  Pain radiates down her right leg at times.  Pain worsens with bending, lying, getting up.  Pain improves with rest.  She has undergone physical therapy with mild benefits. The patient denies of any significant changes in her health since her last appointment. The patient also denies of any changes in the character of her pain since  "her last appointment.  Denies any worsening weakness.  No bowel bladder changes.    ROS:  CONSTITUTIONAL: No fevers, chills, night sweats, wt. loss, appetite changes  SKIN: no rashes or itching  ENT: No headaches, head trauma, vision changes, or eye pain  LYMPH NODES: None noticed   CV: No chest pain, palpitations.   RESP: No shortness of breath, dyspnea on exertion, cough, wheezing, or hemoptysis  GI: No nausea, emesis, diarrhea, constipation, melena, hematochezia, pain.    : No dysuria, hematuria, urgency, or frequency   HEME: No easy bruising, bleeding problems  PSYCHIATRIC: No depression, anxiety, psychosis, hallucinations.  NEURO: No seizures, memory loss, dizziness or difficulty sleeping  MSK:  Positive HPI    MEDICAL, SURGICAL, FAMILY, SOCIAL HX: reviewed    MEDICATIONS/ALLERGIES: reviewed         Medications/Allergies: See med card    Vitals:    02/23/23 1053   BP: 104/69   Pulse: 99   Weight: 59 kg (130 lb)   Height: 5' 3" (1.6 m)   PainSc:   8   PainLoc: Back           Physical exam:    GENERAL: A and O x3, the patient appears well groomed and is in no acute distress.  Skin: No rashes or obvious lesions  HEENT: normocephalic, atraumatic  CARDIOVASCULAR:  Palpable peripheral pulses  LUNGS: easy work of breathing  ABDOMEN: soft, nontender   UPPER EXTREMITIES: Normal alignment, normal range of motion, no atrophy, no skin changes,  hair growth and nail growth normal and equal bilaterally. No swelling, no tenderness.    LOWER EXTREMITIES:  Normal alignment, normal range of motion, no atrophy, no skin changes,  hair growth and nail growth normal and equal bilaterally. No swelling, no tenderness.  LUMBAR SPINE  Lumbar spine: ROM is limited with flexion extension and oblique extension with mild increased pain.    Byron's test causes increased pain on right side.  +thigh thrust and compression on right side  Supine straight leg raise is negative bilaterally.    Internal and external rotation of the hip causes " no increased pain on either side.  Myofascial exam: No tenderness to palpation across lumbar paraspinous muscles.      MENTAL STATUS: normal orientation, speech, language, and fund of knowledge for social situation.  Emotional state appropriate.    MOTOR: Tone and bulk: normal bilateral upper and lower Strength: normal     SENSATION: Light touch and pinprick intact bilaterally  REFLEXES: normal, symmetric, nonbrisk.  Toes down, no clonus. No hoffmans.  GAIT: antalgic gait       Imagin2022: EXAMINATION:  XR HIP WITH PELVIS WHEN PERFORMED, 2 OR 3  VIEWS RIGHT     CLINICAL HISTORY:  Displaced intertrochanteric fracture of right femur, subsequent encounter for closed fracture with routine healing     TECHNIQUE:  AP view of the pelvis and frog leg lateral view of the right hip were performed.     COMPARISON:  2022     FINDINGS:  There is no intramedullary tati with proximal screw.  No new Cherrie prosthetic fractures visualized.  There is no evidence periprosthetic lucency.  Both hips are located.     Impression:     The patient is status post repair of a right intertrochanteric hip fracture.  No new fractures visualized.       Assessment:  Patient presents with right buttock pain and right hip pain.     1. Sacroiliitis    2. Right hip pain    3. Closed fracture of femur, intertrochanteric, right, with routine healing, subsequent encounter    4. Other spondylosis, lumbar region            Plan:  I have stressed the importance of physical activity and exercise to improve overall health  Reviewed pertinent imaging and records with patient  Patient underwent right SI joint injection under fluoroscopy.  This was diagnostic for her right buttock pain.  However, relief was short-lived.  We discussed performing diagnostic blocks which includes right L5 medial branch, right S1 and S2 lateral branch blocks.  If successful, would proceed with radiofrequency ablation  Follow-up after procedure

## 2023-02-23 NOTE — TELEPHONE ENCOUNTER
Order Date:2/23/2023   Ordering User:MAEGAN ROBLES [[id number]]   Encounter Provider:Maegna Robles MD [[id number]]   Authorizing Provider: Maegan Robles MD [[id number]]   Department:[facility] PAIN MANAGEMENT[[id number]]      Common Order Information   Procedure -> Other (Specify location and laterality) Cmt: RIGHT L5 MBB, S1 and              S2 LBB      Order Spec  Order Date:2/23/2023   Ordering User:MAEGAN ROBLES [[id number]]   Encounter Provider:Maegan Robles MD [[id number]]   Authorizing Provider: Maegan Robles MD [[id number]]   Department:[facility] PAIN MANAGEMENT[[id number]]      Common Order Information   Procedure -> Other (Specify location and laterality) Cmt: RIGHT L5 MBB, S1 and              S2 LBB      Order Specific Information   Order: Procedure Order to Pain Management [Custom: [id number]]  Order #:           [id number]: 1 FUTURE     Priority: Routine  Class: Clinic Performed     Future Order Information       Exp   ires on:02/23/2024            Expected by:02/23/2023                    Associated Diagnoses       M46.1 Sacroiliitis       Facility Name: -> [facility] Information   Order: Procedure Order to Pain Management [Custom: [id number]]  Order #:           [id number]: 1 FUTURE     Priority: Routine  Class: Clinic Performed     Future Order Information       Exp   ires on:02/23/2024            Expected by:02/23/2023                    Associated Diagnoses       M46.1 Sacroiliitis       Facility Name: -> [facility]

## 2023-02-24 DIAGNOSIS — Z85.43 PERSONAL HISTORY OF MALIGNANT NEOPLASM OF OVARY: Primary | ICD-10-CM

## 2023-02-24 LAB — CANCER AG125 SERPL-ACNC: 10.1 U/ML (ref 0–38.1)

## 2023-02-25 ENCOUNTER — OFFICE VISIT (OUTPATIENT)
Dept: URGENT CARE | Facility: CLINIC | Age: 83
End: 2023-02-25
Payer: MEDICARE

## 2023-02-25 ENCOUNTER — NURSE TRIAGE (OUTPATIENT)
Dept: ADMINISTRATIVE | Facility: CLINIC | Age: 83
End: 2023-02-25
Payer: MEDICARE

## 2023-02-25 VITALS
DIASTOLIC BLOOD PRESSURE: 86 MMHG | RESPIRATION RATE: 16 BRPM | TEMPERATURE: 97 F | WEIGHT: 124 LBS | SYSTOLIC BLOOD PRESSURE: 139 MMHG | BODY MASS INDEX: 21.97 KG/M2 | HEIGHT: 63 IN | HEART RATE: 106 BPM | OXYGEN SATURATION: 100 %

## 2023-02-25 DIAGNOSIS — A08.4 VIRAL GASTROENTERITIS: Primary | ICD-10-CM

## 2023-02-25 PROCEDURE — 3075F SYST BP GE 130 - 139MM HG: CPT | Mod: CPTII,S$GLB,, | Performed by: NURSE PRACTITIONER

## 2023-02-25 PROCEDURE — 3079F PR MOST RECENT DIASTOLIC BLOOD PRESSURE 80-89 MM HG: ICD-10-PCS | Mod: CPTII,S$GLB,, | Performed by: NURSE PRACTITIONER

## 2023-02-25 PROCEDURE — 1159F MED LIST DOCD IN RCRD: CPT | Mod: CPTII,S$GLB,, | Performed by: NURSE PRACTITIONER

## 2023-02-25 PROCEDURE — 99214 OFFICE O/P EST MOD 30 MIN: CPT | Mod: S$GLB,,, | Performed by: NURSE PRACTITIONER

## 2023-02-25 PROCEDURE — 1157F ADVNC CARE PLAN IN RCRD: CPT | Mod: CPTII,S$GLB,, | Performed by: NURSE PRACTITIONER

## 2023-02-25 PROCEDURE — 99214 PR OFFICE/OUTPT VISIT, EST, LEVL IV, 30-39 MIN: ICD-10-PCS | Mod: S$GLB,,, | Performed by: NURSE PRACTITIONER

## 2023-02-25 PROCEDURE — 1159F PR MEDICATION LIST DOCUMENTED IN MEDICAL RECORD: ICD-10-PCS | Mod: CPTII,S$GLB,, | Performed by: NURSE PRACTITIONER

## 2023-02-25 PROCEDURE — 1157F PR ADVANCE CARE PLAN OR EQUIV PRESENT IN MEDICAL RECORD: ICD-10-PCS | Mod: CPTII,S$GLB,, | Performed by: NURSE PRACTITIONER

## 2023-02-25 PROCEDURE — 3075F PR MOST RECENT SYSTOLIC BLOOD PRESS GE 130-139MM HG: ICD-10-PCS | Mod: CPTII,S$GLB,, | Performed by: NURSE PRACTITIONER

## 2023-02-25 PROCEDURE — 3079F DIAST BP 80-89 MM HG: CPT | Mod: CPTII,S$GLB,, | Performed by: NURSE PRACTITIONER

## 2023-02-25 RX ORDER — ONDANSETRON 4 MG/1
4 TABLET, ORALLY DISINTEGRATING ORAL EVERY 6 HOURS PRN
Qty: 20 TABLET | Refills: 0 | Status: ON HOLD | OUTPATIENT
Start: 2023-02-25 | End: 2023-03-21 | Stop reason: HOSPADM

## 2023-02-25 NOTE — TELEPHONE ENCOUNTER
"Patient states she has been vomiting for the last two days. She vomited 7 times yesterday and 3 times today. She is now able to keep down fluids such as pedialyte and water. Still throwing up food; tried to eat a scrambled egg. She does complain of weakness, denies dizziness. She last urinated around 7 am. Contacted Dr. Flores who agreed with care disposition to go to [facility]. Relayed information to patient; plans to go to [facility].    Reason for Disposition   [1] MODERATE vomiting (e.g., 3 - 5 times/day) AND [2] age > 60 years    Additional Information   Negative: Shock suspected (e.g., cold/pale/clammy skin, too weak to stand, low BP, rapid pulse)   Negative: Difficult to awaken or acting confused (e.g., disoriented, slurred speech)   Negative: Sounds like a life-threatening emergency to the triager   Negative: [1] Vomiting AND [2] contains red blood or black ("coffee ground") material  (Exception: few red streaks in vomit that only happened once)   Negative: Severe pain in one eye   Negative: Recent head injury (within last 3 days)   Negative: Recent abdominal injury (within last 3 days)   Negative: [1] Insulin-dependent diabetes (Type I) AND [2] glucose > 400 mg/dl (22 mmol/l)   Negative: [1] Vomiting AND [2] hernia is more painful or swollen than usual   Negative: [1] SEVERE vomiting (e.g., 6 or more times/day) AND [2] present > 8 hours (Exception: patient sounds well, is drinking liquids, does not sound dehydrated, and vomiting has lasted less than 24 hours)    Protocols used: Vomiting-A-AH    "

## 2023-02-25 NOTE — PROGRESS NOTES
"Subjective:       Patient ID: Alexa Otero is a 83 y.o. female.    Vitals:  height is 5' 3" (1.6 m) and weight is 56.2 kg (124 lb). Her temperature is 97 °F (36.1 °C). Her blood pressure is 139/86 and her pulse is 106. Her respiration is 16 and oxygen saturation is 100%.     Chief Complaint: Vomiting    Pt states she has been vomiting since Thursday night, has tried Pedialyte and is able to keep it down, but tried egg today and it came back up. Denies any other symptoms.     Gastrointestinal:  Positive for nausea and vomiting. Negative for abdominal trauma, abdominal pain, abdominal bloating, constipation, diarrhea and heartburn (burping noted).   Genitourinary: Negative.      Objective:      Physical Exam   Cardiovascular: Normal rate and regular rhythm.   Pulmonary/Chest: Effort normal and breath sounds normal.   Abdominal: She exhibits no distension and no mass. Soft. flat abdomen There is no abdominal tenderness. There is no rebound and no guarding. No hernia.   Neurological: She is alert.       Assessment:       1. Viral gastroenteritis          Plan:         Viral gastroenteritis    Other orders  -     ondansetron (ZOFRAN-ODT) 4 MG TbDL; Take 1 tablet (4 mg total) by mouth every 6 (six) hours as needed (nausea and vomiting).  Dispense: 20 tablet; Refill: 0           Medical Decision Making:   Initial Assessment:   Nausea and vomiting.   Differential Diagnosis:   Viral gastro  SBO  Constipation  Acute abd  Urgent Care Management:  Viral gastro  Advance diet as tolerated     Completed levaquin  No QT interval changes.     Patient Instructions   Chris Liu     Liquid diet  BRAT diet. Banana rice applesauce toast or like  Soft foods  Nothing greasy or spicy for a week.    "

## 2023-02-25 NOTE — PATIENT INSTRUCTIONS
Chris Liu,     Liquid diet  BRAT diet. Banana rice applesauce toast or like  Soft foods  Nothing greasy or spicy for a week.   
How Severe Is Your Acne?: moderate
Is This A New Presentation, Or A Follow-Up?: Acne

## 2023-02-27 NOTE — TELEPHONE ENCOUNTER
Spoke with patient.   She is feeling much better today   No vomiting.   Urinating OK She had a normal BM today.   She will keep follow up appointment with Ms Mcintosh on 3/3/2023   Instructed to call back if worsens   Verbalizes understanding

## 2023-03-02 ENCOUNTER — TELEPHONE (OUTPATIENT)
Dept: PAIN MEDICINE | Facility: CLINIC | Age: 83
End: 2023-03-02
Payer: MEDICARE

## 2023-03-02 NOTE — TELEPHONE ENCOUNTER
----- Message from Agustin Robles MD sent at 3/2/2023 10:42 AM CST -----  Regarding: RE: Pending Approval Due to Pain Relief Percentage  We can set one up  ----- Message -----  From: Rufina Alvarado LPN  Sent: 3/2/2023   7:29 AM CST  To: Agustin Robles MD  Subject: FW: Pending Approval Due to Pain Relief Perc#    We did resubmit corredct codes but it still needs p2p can you give me dates and times and I will set one up. Please   ----- Message -----  From: Godfrey Santana LPN  Sent: 3/1/2023   4:31 PM CST  To: Rufina Alvarado LPN, Travis Velez Staff  Subject: RE: Pending Approval Due to Pain Relief Perc#    Good Afternoon,      ACTION REQUESTED: To avoid delays in patient care, please contact  [employer] ASAP to schedule a fpve-vs-dtce at [phone]  [employer] Tracking ID <[id number]>  Patient Name: <Alexa Otero>  Date(s) of Service: <03/28/2023 - 06/26/2023>  Whatfs happening? [employer] is requesting to schedule a tehg-dl-hymp conversation  with your physician regarding a prior authorization request.  Why? We need more clinical information regarding this request. This conversation will allow  us to determine the clinical appropriateness of your authorization and make a decision for  timely care.  When? Please contact [employer] to schedule the drzz-le-znpj. We ask that you call us as soon  as possible to find a time that works.  How? Contact [employer] at [phone]. Please have the following information ready:   Tracking ID of the case   Name of MD, PA, or NP we will speak with   Time and date requested (M-F 8-6pm EST)   Primary phone number and extension   Secondary phone number and extension  Please do not respond to this request by fax or email.  More Information about what additional clinical information we need  <Clinical Summary:  Chief complaint, duration, severity: Right Buttocks Pain (Undocumented duration and  severity on VAS score) Pain worsens with bending, lying, getting up.  Treatments attempted &  duration:  Pain improves with rest. She has undergone physical therapy with mild benefits.  Pertinent imaging findings:  Undocumented  Plan of care: Diagnostic blocks which includes right L5 medial branch, right S1 and S2  lateral branch block  Decision Explanation: missing conservative treatment, duration of pain for at least 3  months, imaging report, and pain score. (Received hip imaging and SI injection  treatment. )>  ----- Message -----  From: Rufina Alvarado LPN  Sent: 3/1/2023  11:27 AM CST  To: Godfrey Santana LPN  Subject: RE: Pending Approval Due to Pain Relief Perc#    Got it thank you   ----- Message -----  From: Godfrey Santana LPN  Sent: 3/1/2023  11:20 AM CST  To: Rufina Alvarado LPN  Subject: RE: Pending Approval Due to Pain Relief Perc#    95849 do not require a prior auth. I only had to resubmit for 62656, which is pending review.  ----- Message -----  From: Rufina Alvarado LPN  Sent: 3/1/2023   9:55 AM CST  To: Godfrey Santana LPN  Subject: RE: Pending Approval Due to Pain Relief Perc#    It should be 73504 and 18819   ----- Message -----  From: Godfrey Santana LPN  Sent: 3/1/2023   9:44 AM CST  To: Rufina Alvarado LPN  Subject: RE: Pending Approval Due to Pain Relief Perc#    Good Morning,   New authorization submitted for CPT code 78206 only. Case pending review.  ----- Message -----  From: Rufina Alvarado LPN  Sent: 3/1/2023   7:14 AM CST  To: Godfrey Santana LPN  Subject: RE: Pending Approval Due to Pain Relief Perc#    Ok I just asked the surgery team to remove the extra codes. Please resubmit and keep me updated. Thank you very much   ----- Message -----  From: Godfrey Santana LPN  Sent: 2/28/2023   4:31 PM CST  To: Rufina Alvarado LPN  Subject: RE: Pending Approval Due to Pain Relief Perc#    Only [employer] can determine that.I don't want to say yes and they still end up requesting a Peer to Peer or get a denial. If you remove the code, I will resubmit to try to get  approval.  ----- Message -----  From: Rufina Alvarado LPN  Sent: 2/28/2023   1:43 PM CST  To: Jorge Santana LPN  Subject: RE: Pending Approval Due to Pain Relief Perc#    I understand that 49940 should not be a code.   So if I have it removed then she doesn't need a p2p correct?   ----- Message -----  From: Jorge Santana LPN  Sent: 2/28/2023   1:36 PM CST  To: Rufina Alvarado LPN  Subject: RE: Pending Approval Due to Pain Relief Perc#    I sent all of the codes that was listed on the referral which was 03896, 20633,05152 and 63620. The code was denied because it can not be completed on the same day as an MBB. It's stated at the bottom of the Peer to Peer request.  ----- Message -----  From: Rufina Alvarado LPN  Sent: 2/27/2023   3:52 PM CST  To: Jorge Santana LPN  Subject: RE: Pending Approval Due to Pain Relief Perc#    What codes were sent in? I think its the wrong code. Pt already had 06577. We need this to be 07072 and 49402    ----- Message -----  From: Jorge Santana LPN  Sent: 2/27/2023   3:35 PM CST  To: Travis Franco  Subject: Pending Approval Due to Pain Relief Percenta#    Good Afternoon,    I just want to give you a heads up that code 58190 is pending approval due to relief level not being over 75%. All of the other codes were approved. Will follow-up once the decision is made.     Thank you,  Jorge Santana LPN  [phone] ph

## 2023-03-02 NOTE — TELEPHONE ENCOUNTER
Called [employer] set up p2p for Monday 03/06 at 315 pm they will call your cell phone . Back up number is your extension at work.

## 2023-03-03 ENCOUNTER — OFFICE VISIT (OUTPATIENT)
Dept: FAMILY MEDICINE | Facility: CLINIC | Age: 83
End: 2023-03-03
Payer: MEDICARE

## 2023-03-03 ENCOUNTER — HOSPITAL ENCOUNTER (OUTPATIENT)
Dept: RADIOLOGY | Facility: HOSPITAL | Age: 83
Discharge: HOME | End: 2023-03-03
Attending: NURSE PRACTITIONER
Payer: MEDICARE

## 2023-03-03 DIAGNOSIS — J18.9 PNEUMONIA OF RIGHT LOWER LOBE DUE TO INFECTIOUS ORGANISM: ICD-10-CM

## 2023-03-03 DIAGNOSIS — R05.9 COUGH, UNSPECIFIED TYPE: ICD-10-CM

## 2023-03-03 DIAGNOSIS — Z09 FOLLOW-UP EXAM: Primary | ICD-10-CM

## 2023-03-03 PROCEDURE — 71046 X-RAY EXAM CHEST 2 VIEWS: CPT | Mod: TC

## 2023-03-03 PROCEDURE — 99213 OFFICE O/P EST LOW 20 MIN: CPT | Mod: HCNC,S$GLB,, | Performed by: NURSE PRACTITIONER

## 2023-03-03 PROCEDURE — 1160F PR REVIEW ALL MEDS BY PRESCRIBER/CLIN PHARMACIST DOCUMENTED: ICD-10-PCS | Mod: HCNC,CPTII,S$GLB, | Performed by: NURSE PRACTITIONER

## 2023-03-03 PROCEDURE — 99213 PR OFFICE/OUTPT VISIT, EST, LEVL III, 20-29 MIN: ICD-10-PCS | Mod: HCNC,S$GLB,, | Performed by: NURSE PRACTITIONER

## 2023-03-03 PROCEDURE — 1157F ADVNC CARE PLAN IN RCRD: CPT | Mod: HCNC,CPTII,S$GLB, | Performed by: NURSE PRACTITIONER

## 2023-03-03 PROCEDURE — 1160F RVW MEDS BY RX/DR IN RCRD: CPT | Mod: HCNC,CPTII,S$GLB, | Performed by: NURSE PRACTITIONER

## 2023-03-03 PROCEDURE — 99999 PR PBB SHADOW E&M-EST. PATIENT-LVL III: CPT | Mod: PBBFAC,HCNC,, | Performed by: NURSE PRACTITIONER

## 2023-03-03 PROCEDURE — 1159F PR MEDICATION LIST DOCUMENTED IN MEDICAL RECORD: ICD-10-PCS | Mod: HCNC,CPTII,S$GLB, | Performed by: NURSE PRACTITIONER

## 2023-03-03 PROCEDURE — 1159F MED LIST DOCD IN RCRD: CPT | Mod: HCNC,CPTII,S$GLB, | Performed by: NURSE PRACTITIONER

## 2023-03-03 PROCEDURE — 99999 PR PBB SHADOW E&M-EST. PATIENT-LVL III: ICD-10-PCS | Mod: PBBFAC,HCNC,, | Performed by: NURSE PRACTITIONER

## 2023-03-03 PROCEDURE — 1157F PR ADVANCE CARE PLAN OR EQUIV PRESENT IN MEDICAL RECORD: ICD-10-PCS | Mod: HCNC,CPTII,S$GLB, | Performed by: NURSE PRACTITIONER

## 2023-03-03 RX ORDER — GUAIFENESIN 1200 MG/1
1 TABLET, EXTENDED RELEASE ORAL 2 TIMES DAILY PRN
Qty: 60 TABLET | Refills: 2 | Status: SHIPPED | OUTPATIENT
Start: 2023-03-03 | End: 2023-03-13

## 2023-03-03 NOTE — PROGRESS NOTES
Subjective:       Patient ID: Alexa Otero is a 83 y.o. female.    Chief Complaint: Follow-up (2wk f/u Cough)     HPI   84 y/o female patient with medical problems listed below presents for 2 weeks follow up of pneumonia. Patient was seen in the clinic for cough on 2/14. Chest CT showed RLL pneumonia and prescribed levaquin 500 mg daily for 10 days which she completed. Sputum culture did not show s aureus or pseudomonas. Today she states did not feel improved and still has constant productive cough with clear phlegm. Cough started around 6/2022. She has hx of GERD and on omeprazole. She is on ACE I benazepril for many years. Patient states was given albuterol inhaler but has not used it due to recent stomach flu which she reports improving. Denies chest pain, sob, wheezing, nausea, vomiting, abdominal pain, fever, chills, generalized body ache or weakness.     Follows with Dr. Ray for right ovarian cancer and has scheduled appt of CT PET on 3/9    CT Chest 2/14/2023  Impression:   Dense right lower lobe infiltrate consistent with pneumonia.  Underlying emphysema.  Calcified encapsulated bilateral breast implants.  Venous Port-A-Cath in position.  4.3 cm hypodense lesion of the liver parenchyma, stable.    Chest x-ray 11/15/2022  Impression:   No radiographic evidence of active chest disease.     Patient Active Problem List   Diagnosis    Sciatica    Syncope and collapse    Polycythemia, secondary    Hyperlipidemia associated with type 2 diabetes mellitus    Controlled type 2 diabetes mellitus with stage 3 chronic kidney disease, without long-term current use of insulin    Hypertension associated with diabetes    Vitamin D deficiency disease    Gastroesophageal reflux disease    History of Kwon's esophagus    Pancreatic pseudocyst/cyst    Hepatic cyst    Low back pain radiating to both legs    Primary osteoarthritis of right ankle    Kwon's esophagus    Low back pain    Dupuytren's contracture of both hands     Right lower quadrant abdominal swelling, mass and lump    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     Closed fracture of femur, intertrochanteric, right, with routine healing, subsequent encounter    Right hip pain    Weakness of right lower extremity    Impaired functional mobility and activity tolerance    Maintenance chemotherapy    Atherosclerosis of aorta      Review of patient's allergies indicates:  No Known Allergies    Past Surgical History:   Procedure Laterality Date    AUGMENTATION OF BREAST      CHOLECYSTECTOMY      COLONOSCOPY      ESOPHAGOGASTRODUODENOSCOPY N/A 06/20/2018    Procedure: EGD (ESOPHAGOGASTRODUODENOSCOPY);  Surgeon: Sabino Stephenson MD;  Location: [facility];  Service: Endoscopy;  Laterality: N/A;    HYSTERECTOMY      partial    INJECTION, SACROILIAC JOINT Right 1/26/2023    Procedure: INJECTION,SACROILIAC JOINT;  Surgeon: Agustin Robles MD;  Location: [facility] OR;  Service: Pain Management;  Laterality: Right;    INSERTION OF TUNNELED CENTRAL VENOUS CATHETER (CVC) WITH SUBCUTANEOUS PORT Right 10/19/2020    Procedure: INSERTION, PORT-A-CATH;  Surgeon: Misti Mcfarland MD;  Location: [facility] OR;  Service: General;  Laterality: Right;    INTRAMEDULLARY RODDING OF TROCHANTER OF FEMUR Right 11/28/2021    Procedure: INSERTION, INTRAMEDULLARY LUC, FEMUR, TROCHANTER/RIGHT TFN DR FAJARDO NOTIFIED REP;  Surgeon: Kp Fajardo MD;  Location: [facility] OR;  Service: Orthopedics;  Laterality: Right;  SKIP    ROBOT-ASSISTED LAPAROSCOPIC LYMPHADENECTOMY USING DA NELLA XI N/A 09/21/2020    Procedure: XI ROBOTIC LYMPHADENECTOMY-pelvic and kell-aortic;  Surgeon: Altagracia Ray MD;  Location: [facility] OR;  Service: OB/GYN;  Laterality: N/A;    ROBOT-ASSISTED LAPAROSCOPIC OMENTECTOMY USING DA NELLA XI N/A 09/21/2020    Procedure: XI ROBOTIC OMENTECTOMY;  Surgeon: Altagracia Ray MD;  Location: [facility] OR;  Service: OB/GYN;  Laterality: N/A;    ROBOT-ASSISTED LAPAROSCOPIC SALPINGO-OOPHORECTOMY USING  DA NELLA XI Bilateral 09/21/2020    Procedure: XI ROBOTIC SALPINGO-OOPHORECTOMY;  Surgeon: Altagracia Ray MD;  Location: [facility];  Service: OB/GYN;  Laterality: Bilateral;    UPPER GASTROINTESTINAL ENDOSCOPY          Current Outpatient Medications:     albuterol (PROVENTIL/VENTOLIN HFA) 90 mcg/actuation inhaler, INHALE 2 PUFFS INTO THE LUNGS FOUR TIMES DAILY, Disp: 54 g, Rfl: 0    benazepriL (LOTENSIN) 40 MG tablet, TAKE 1 TABLET(40 MG) BY MOUTH EVERY DAY, Disp: 90 tablet, Rfl: 2    chlorthalidone (HYGROTEN) 25 MG Tab, Take 1 tablet (25 mg total) by mouth once daily., Disp: 90 tablet, Rfl: 3    gabapentin (NEURONTIN) 100 MG capsule, TAKE 1 CAPSULE(100 MG) BY MOUTH TWICE DAILY, Disp: 180 capsule, Rfl: 3    levoFLOXacin (LEVAQUIN) 500 MG tablet, Take 1 tablet (500 mg total) by mouth once daily., Disp: 10 tablet, Rfl: 0    metFORMIN (GLUCOPHAGE) 500 MG tablet, TAKE 1 TABLET(500 MG) BY MOUTH TWICE DAILY WITH MEALS, Disp: 180 tablet, Rfl: 3    omeprazole (PRILOSEC) 40 MG capsule, TAKE 1 CAPSULE(40 MG) BY MOUTH EVERY DAY, Disp: 90 capsule, Rfl: 3    ondansetron (ZOFRAN-ODT) 4 MG TbDL, Take 1 tablet (4 mg total) by mouth every 6 (six) hours as needed (nausea and vomiting)., Disp: 20 tablet, Rfl: 0    simvastatin (ZOCOR) 40 MG tablet, TAKE 1 TABLET(40 MG) BY MOUTH EVERY EVENING, Disp: 90 tablet, Rfl: 0    VIT A/VIT C/VIT E/ZINC/COPPER (ICAPS AREDS ORAL), Take 1 capsule by mouth 2 (two) times a day. , Disp: , Rfl:     guaiFENesin (MUCINEX) 1,200 mg Ta12, Take 1 tablet by mouth 2 (two) times daily as needed (cough)., Disp: 60 tablet, Rfl: 2    Review of Systems   Constitutional:  Negative for chills and fever.   Respiratory:  Positive for cough. Negative for chest tightness and shortness of breath.    Cardiovascular:  Negative for chest pain and palpitations.   Gastrointestinal:  Negative for abdominal pain.   Neurological:  Negative for dizziness and headaches.       Objective:   There were no vitals taken for this  visit.      Physical Exam  Constitutional:       General: She is not in acute distress.     Appearance: Normal appearance.   HENT:      Head: Atraumatic.   Cardiovascular:      Rate and Rhythm: Normal rate and regular rhythm.      Pulses: Normal pulses.      Heart sounds: Normal heart sounds.   Pulmonary:      Effort: Pulmonary effort is normal.      Breath sounds: Rhonchi present. No rales.   Abdominal:      General: Abdomen is flat. Bowel sounds are normal.      Palpations: Abdomen is soft.   Skin:     General: Skin is warm and dry.   Neurological:      General: No focal deficit present.      Mental Status: She is alert and oriented to person, place, and time.   Psychiatric:         Mood and Affect: Mood normal.       Assessment:       1. Follow-up exam    2. Pneumonia of right lower lobe due to infectious organism    3. Cough, unspecified type        Plan:       1. Pneumonia of right lower lobe due to infectious organism  - VSS stable (/60 IN 88 RR 18 BT 97.7F), O2Sat 96%, patient is NAD  - X-Ray Chest PA And Lateral; Future: Lungs are clear  - Complete PFT w/ bronchodilator; Future  - Ambulatory referral/consult to Pulmonology; Future  - Continue with albuterol inhaler as needed for sob or wheezing  - signs & symptoms discussed with patient when to seek emergent care    2. Cough, unspecified type  - guaiFENesin (MUCINEX) 1,200 mg Ta12; Take 1 tablet by mouth 2 (two) times daily as needed (cough).  Dispense: 60 tablet; Refill: 2    3. Follow-up exam      José Miguel DONOVAN

## 2023-03-06 ENCOUNTER — INFUSION (OUTPATIENT)
Dept: INFUSION THERAPY | Facility: HOSPITAL | Age: 83
End: 2023-03-06
Attending: OBSTETRICS & GYNECOLOGY
Payer: MEDICARE

## 2023-03-06 VITALS
RESPIRATION RATE: 18 BRPM | DIASTOLIC BLOOD PRESSURE: 80 MMHG | HEIGHT: 63 IN | SYSTOLIC BLOOD PRESSURE: 153 MMHG | BODY MASS INDEX: 22.79 KG/M2 | HEART RATE: 102 BPM | OXYGEN SATURATION: 96 % | WEIGHT: 128.63 LBS | TEMPERATURE: 97 F

## 2023-03-06 DIAGNOSIS — C56.1 CARCINOMA OF RIGHT OVARY: ICD-10-CM

## 2023-03-06 DIAGNOSIS — Z51.11 MAINTENANCE CHEMOTHERAPY: Primary | ICD-10-CM

## 2023-03-06 PROCEDURE — 25000003 PHARM REV CODE 250: Performed by: OBSTETRICS & GYNECOLOGY

## 2023-03-06 PROCEDURE — 96523 IRRIG DRUG DELIVERY DEVICE: CPT

## 2023-03-06 PROCEDURE — 63600175 PHARM REV CODE 636 W HCPCS: Performed by: OBSTETRICS & GYNECOLOGY

## 2023-03-06 PROCEDURE — A4216 STERILE WATER/SALINE, 10 ML: HCPCS | Performed by: OBSTETRICS & GYNECOLOGY

## 2023-03-06 RX ORDER — HEPARIN 100 UNIT/ML
500 SYRINGE INTRAVENOUS
Status: CANCELLED
Start: 2023-03-06

## 2023-03-06 RX ORDER — SODIUM CHLORIDE 0.9 % (FLUSH) 0.9 %
10 SYRINGE (ML) INJECTION
Status: DISCONTINUED | OUTPATIENT
Start: 2023-03-06 | End: 2023-03-06 | Stop reason: HOSPADM

## 2023-03-06 RX ORDER — HEPARIN 100 UNIT/ML
500 SYRINGE INTRAVENOUS
Status: COMPLETED | OUTPATIENT
Start: 2023-03-06 | End: 2023-03-06

## 2023-03-06 RX ORDER — SODIUM CHLORIDE 0.9 % (FLUSH) 0.9 %
10 SYRINGE (ML) INJECTION
Status: CANCELLED
Start: 2023-03-06

## 2023-03-06 RX ADMIN — SODIUM CHLORIDE, PRESERVATIVE FREE 10 ML: 5 INJECTION INTRAVENOUS at 03:03

## 2023-03-06 RX ADMIN — HEPARIN 500 UNITS: 100 SYRINGE at 03:03

## 2023-03-06 NOTE — PLAN OF CARE
Problem: Fatigue  Goal: Improved Activity Tolerance  Outcome: Ongoing, Progressing  Intervention: Promote Improved Energy  Flowsheets (Taken 3/6/2023 1528)  Fatigue Management:   fatigue-related activity identified   paced activity encouraged   frequent rest breaks encouraged  Sleep/Rest Enhancement:   noise level reduced   relaxation techniques promoted   regular sleep/rest pattern promoted

## 2023-03-07 ENCOUNTER — TELEPHONE (OUTPATIENT)
Dept: PULMONOLOGY | Facility: CLINIC | Age: 83
End: 2023-03-07
Payer: MEDICARE

## 2023-03-08 DIAGNOSIS — R63.4 LOSS OF WEIGHT: ICD-10-CM

## 2023-03-08 DIAGNOSIS — R19.7 DIARRHEA OF PRESUMED INFECTIOUS ORIGIN: ICD-10-CM

## 2023-03-08 DIAGNOSIS — C56.1 MALIGNANT NEOPLASM OF RIGHT OVARY: ICD-10-CM

## 2023-03-08 DIAGNOSIS — Z85.43 PERSONAL HISTORY OF MALIGNANT NEOPLASM OF OVARY: Primary | ICD-10-CM

## 2023-03-08 DIAGNOSIS — M54.50 LOW BACK PAIN: ICD-10-CM

## 2023-03-09 ENCOUNTER — HOSPITAL ENCOUNTER (OUTPATIENT)
Dept: RADIOLOGY | Facility: HOSPITAL | Age: 83
Discharge: HOME | End: 2023-03-09
Attending: OBSTETRICS & GYNECOLOGY
Payer: MEDICARE

## 2023-03-09 VITALS — HEIGHT: 63 IN | WEIGHT: 130 LBS | BODY MASS INDEX: 23.04 KG/M2

## 2023-03-09 DIAGNOSIS — Z85.43 PERSONAL HISTORY OF MALIGNANT NEOPLASM OF OVARY: ICD-10-CM

## 2023-03-09 DIAGNOSIS — M54.50 LOW BACK PAIN: ICD-10-CM

## 2023-03-09 DIAGNOSIS — R63.4 LOSS OF WEIGHT: ICD-10-CM

## 2023-03-09 DIAGNOSIS — R19.7 DIARRHEA OF PRESUMED INFECTIOUS ORIGIN: ICD-10-CM

## 2023-03-09 DIAGNOSIS — C56.1 MALIGNANT NEOPLASM OF RIGHT OVARY: ICD-10-CM

## 2023-03-09 LAB — GLUCOSE SERPL-MCNC: 86 MG/DL (ref 70–110)

## 2023-03-09 PROCEDURE — A9552 F18 FDG: HCPCS | Mod: PO

## 2023-03-13 ENCOUNTER — HOSPITAL ENCOUNTER (OUTPATIENT)
Dept: RADIOLOGY | Facility: CLINIC | Age: 83
Discharge: HOME | End: 2023-03-13
Payer: MEDICARE

## 2023-03-13 DIAGNOSIS — K59.09 CHRONIC CONSTIPATION WITH OVERFLOW: ICD-10-CM

## 2023-03-13 PROCEDURE — 74018 XR ABDOMEN AP 1 VIEW: ICD-10-PCS | Mod: 26,HCNC,, | Performed by: RADIOLOGY

## 2023-03-13 PROCEDURE — 74018 RADEX ABDOMEN 1 VIEW: CPT | Mod: TC,HCNC,FY,PO

## 2023-03-13 PROCEDURE — 74018 RADEX ABDOMEN 1 VIEW: CPT | Mod: 26,HCNC,, | Performed by: RADIOLOGY

## 2023-03-18 ENCOUNTER — HOSPITAL ENCOUNTER (OUTPATIENT)
Facility: HOSPITAL | Age: 83
Discharge: HOME | End: 2023-03-21
Attending: EMERGENCY MEDICINE | Admitting: INTERNAL MEDICINE
Payer: MEDICARE

## 2023-03-18 DIAGNOSIS — R09.02 HYPOXEMIA REQUIRING SUPPLEMENTAL OXYGEN: ICD-10-CM

## 2023-03-18 DIAGNOSIS — Z99.81 HYPOXEMIA REQUIRING SUPPLEMENTAL OXYGEN: ICD-10-CM

## 2023-03-18 DIAGNOSIS — J18.9 PNEUMONIA OF RIGHT LOWER LOBE DUE TO INFECTIOUS ORGANISM: ICD-10-CM

## 2023-03-18 DIAGNOSIS — R11.10 VOMITING: ICD-10-CM

## 2023-03-18 DIAGNOSIS — J18.9 PNEUMONIA OF RIGHT LUNG DUE TO INFECTIOUS ORGANISM, UNSPECIFIED PART OF LUNG: Primary | ICD-10-CM

## 2023-03-18 DIAGNOSIS — R07.9 CHEST PAIN: ICD-10-CM

## 2023-03-18 LAB
ABO + RH BLD: NORMAL
ALBUMIN SERPL BCP-MCNC: 3.6 G/DL (ref 3.5–5.2)
ALP SERPL-CCNC: 42 U/L (ref 55–135)
ALT SERPL W/O P-5'-P-CCNC: 12 U/L (ref 10–44)
ANION GAP SERPL CALC-SCNC: 17 MMOL/L (ref 8–16)
APTT BLDCRRT: 91 SEC (ref 21–32)
AST SERPL-CCNC: 16 U/L (ref 10–40)
BASOPHILS # BLD AUTO: 0.02 K/UL (ref 0–0.2)
BASOPHILS NFR BLD: 0.2 % (ref 0–1.9)
BILIRUB SERPL-MCNC: 0.9 MG/DL (ref 0.1–1)
BILIRUB UR QL STRIP: NEGATIVE
BLD GP AB SCN CELLS X3 SERPL QL: NORMAL
BNP SERPL-MCNC: 138 PG/ML (ref 0–99)
BUN SERPL-MCNC: 20 MG/DL (ref 8–23)
CALCIUM SERPL-MCNC: 8.9 MG/DL (ref 8.7–10.5)
CHLORIDE SERPL-SCNC: 100 MMOL/L (ref 95–110)
CK SERPL-CCNC: 35 U/L (ref 20–180)
CLARITY UR: CLEAR
CO2 SERPL-SCNC: 21 MMOL/L (ref 23–29)
COLOR UR: YELLOW
CREAT SERPL-MCNC: 1.1 MG/DL (ref 0.5–1.4)
DIFFERENTIAL METHOD: ABNORMAL
EOSINOPHIL # BLD AUTO: 0 K/UL (ref 0–0.5)
EOSINOPHIL NFR BLD: 0 % (ref 0–8)
ERYTHROCYTE [DISTWIDTH] IN BLOOD BY AUTOMATED COUNT: 14.7 % (ref 11.5–14.5)
EST. GFR  (NO RACE VARIABLE): 50.2 ML/MIN/1.73 M^2
GLUCOSE SERPL-MCNC: 159 MG/DL (ref 70–110)
GLUCOSE UR QL STRIP: NEGATIVE
HCT VFR BLD AUTO: 36.4 % (ref 37–48.5)
HGB BLD-MCNC: 12 G/DL (ref 12–16)
HGB UR QL STRIP: ABNORMAL
IMM GRANULOCYTES # BLD AUTO: 0.06 K/UL (ref 0–0.04)
IMM GRANULOCYTES NFR BLD AUTO: 0.5 % (ref 0–0.5)
INFLUENZA A, MOLECULAR: NEGATIVE
INFLUENZA B, MOLECULAR: NEGATIVE
INR PPP: 1.2 (ref 0.8–1.2)
KETONES UR QL STRIP: ABNORMAL
LACTATE SERPL-SCNC: 1.4 MMOL/L (ref 0.5–1.9)
LACTATE SERPL-SCNC: 2.1 MMOL/L (ref 0.5–1.9)
LEUKOCYTE ESTERASE UR QL STRIP: NEGATIVE
LIPASE SERPL-CCNC: 29 U/L (ref 4–60)
LYMPHOCYTES # BLD AUTO: 0.7 K/UL (ref 1–4.8)
LYMPHOCYTES NFR BLD: 6.1 % (ref 18–48)
MAGNESIUM SERPL-MCNC: 0.6 MG/DL (ref 1.6–2.6)
MAGNESIUM SERPL-MCNC: 0.6 MG/DL (ref 1.6–2.6)
MCH RBC QN AUTO: 29.6 PG (ref 27–31)
MCHC RBC AUTO-ENTMCNC: 33 G/DL (ref 32–36)
MCV RBC AUTO: 90 FL (ref 82–98)
MONOCYTES # BLD AUTO: 1.2 K/UL (ref 0.3–1)
MONOCYTES NFR BLD: 9.7 % (ref 4–15)
NEUTROPHILS # BLD AUTO: 10.2 K/UL (ref 1.8–7.7)
NEUTROPHILS NFR BLD: 83.5 % (ref 38–73)
NITRITE UR QL STRIP: NEGATIVE
NRBC BLD-RTO: 0 /100 WBC
PH UR STRIP: 5 [PH] (ref 5–8)
PLATELET # BLD AUTO: 236 K/UL (ref 150–450)
PMV BLD AUTO: 9.6 FL (ref 9.2–12.9)
POTASSIUM SERPL-SCNC: 2.8 MMOL/L (ref 3.5–5.1)
PROCALCITONIN SERPL IA-MCNC: 1.42 NG/ML (ref 0–0.5)
PROT SERPL-MCNC: 6.8 G/DL (ref 6–8.4)
PROT UR QL STRIP: ABNORMAL
PROTHROMBIN TIME: 12.5 SEC (ref 9–12.5)
RBC # BLD AUTO: 4.05 M/UL (ref 4–5.4)
SARS-COV-2 RDRP RESP QL NAA+PROBE: NEGATIVE
SODIUM SERPL-SCNC: 138 MMOL/L (ref 136–145)
SP GR UR STRIP: 1.02 (ref 1–1.03)
SPECIMEN SOURCE: NORMAL
TROPONIN I SERPL HS-MCNC: 10.3 PG/ML (ref 0–14.9)
TROPONIN I SERPL HS-MCNC: 12.1 PG/ML (ref 0–14.9)
TSH SERPL DL<=0.005 MIU/L-ACNC: 0.68 UIU/ML (ref 0.34–5.6)
URN SPEC COLLECT METH UR: ABNORMAL
UROBILINOGEN UR STRIP-ACNC: NEGATIVE EU/DL
WBC # BLD AUTO: 12.16 K/UL (ref 3.9–12.7)

## 2023-03-18 PROCEDURE — 85610 PROTHROMBIN TIME: CPT | Performed by: EMERGENCY MEDICINE

## 2023-03-18 PROCEDURE — 86900 BLOOD TYPING SEROLOGIC ABO: CPT | Performed by: EMERGENCY MEDICINE

## 2023-03-18 PROCEDURE — 96375 TX/PRO/DX INJ NEW DRUG ADDON: CPT

## 2023-03-18 PROCEDURE — 99285 EMERGENCY DEPT VISIT HI MDM: CPT | Mod: 25

## 2023-03-18 PROCEDURE — 96367 TX/PROPH/DG ADDL SEQ IV INF: CPT

## 2023-03-18 PROCEDURE — 87040 BLOOD CULTURE FOR BACTERIA: CPT | Performed by: EMERGENCY MEDICINE

## 2023-03-18 PROCEDURE — 96366 THER/PROPH/DIAG IV INF ADDON: CPT

## 2023-03-18 PROCEDURE — 96365 THER/PROPH/DIAG IV INF INIT: CPT | Mod: 59

## 2023-03-18 PROCEDURE — 83690 ASSAY OF LIPASE: CPT | Performed by: EMERGENCY MEDICINE

## 2023-03-18 PROCEDURE — 93010 ELECTROCARDIOGRAM REPORT: CPT | Mod: ,,, | Performed by: INTERNAL MEDICINE

## 2023-03-18 PROCEDURE — 87449 NOS EACH ORGANISM AG IA: CPT | Performed by: INTERNAL MEDICINE

## 2023-03-18 PROCEDURE — 85730 THROMBOPLASTIN TIME PARTIAL: CPT | Performed by: EMERGENCY MEDICINE

## 2023-03-18 PROCEDURE — 25500020 PHARM REV CODE 255: Performed by: EMERGENCY MEDICINE

## 2023-03-18 PROCEDURE — 96368 THER/DIAG CONCURRENT INF: CPT

## 2023-03-18 PROCEDURE — U0002 COVID-19 LAB TEST NON-CDC: HCPCS | Performed by: EMERGENCY MEDICINE

## 2023-03-18 PROCEDURE — 83880 ASSAY OF NATRIURETIC PEPTIDE: CPT | Performed by: EMERGENCY MEDICINE

## 2023-03-18 PROCEDURE — 80053 COMPREHEN METABOLIC PANEL: CPT | Performed by: EMERGENCY MEDICINE

## 2023-03-18 PROCEDURE — 85025 COMPLETE CBC W/AUTO DIFF WBC: CPT | Performed by: EMERGENCY MEDICINE

## 2023-03-18 PROCEDURE — 81003 URINALYSIS AUTO W/O SCOPE: CPT | Performed by: EMERGENCY MEDICINE

## 2023-03-18 PROCEDURE — 25000003 PHARM REV CODE 250: Performed by: EMERGENCY MEDICINE

## 2023-03-18 PROCEDURE — 83605 ASSAY OF LACTIC ACID: CPT | Performed by: EMERGENCY MEDICINE

## 2023-03-18 PROCEDURE — 84443 ASSAY THYROID STIM HORMONE: CPT | Performed by: EMERGENCY MEDICINE

## 2023-03-18 PROCEDURE — 84484 ASSAY OF TROPONIN QUANT: CPT | Mod: 91 | Performed by: EMERGENCY MEDICINE

## 2023-03-18 PROCEDURE — 36415 COLL VENOUS BLD VENIPUNCTURE: CPT | Performed by: EMERGENCY MEDICINE

## 2023-03-18 PROCEDURE — 84145 PROCALCITONIN (PCT): CPT | Performed by: EMERGENCY MEDICINE

## 2023-03-18 PROCEDURE — 63600175 PHARM REV CODE 636 W HCPCS: Mod: TB,JG | Performed by: EMERGENCY MEDICINE

## 2023-03-18 PROCEDURE — 82550 ASSAY OF CK (CPK): CPT | Performed by: EMERGENCY MEDICINE

## 2023-03-18 PROCEDURE — 93010 EKG 12-LEAD: ICD-10-PCS | Mod: ,,, | Performed by: INTERNAL MEDICINE

## 2023-03-18 PROCEDURE — 87502 INFLUENZA DNA AMP PROBE: CPT | Performed by: EMERGENCY MEDICINE

## 2023-03-18 PROCEDURE — 83735 ASSAY OF MAGNESIUM: CPT | Performed by: EMERGENCY MEDICINE

## 2023-03-18 PROCEDURE — 93005 ELECTROCARDIOGRAM TRACING: CPT | Performed by: INTERNAL MEDICINE

## 2023-03-18 RX ORDER — ACETAMINOPHEN 500 MG
1000 TABLET ORAL
Status: DISPENSED | OUTPATIENT
Start: 2023-03-18 | End: 2023-03-19

## 2023-03-18 RX ORDER — ONDANSETRON 2 MG/ML
4 INJECTION INTRAMUSCULAR; INTRAVENOUS
Status: COMPLETED | OUTPATIENT
Start: 2023-03-18 | End: 2023-03-18

## 2023-03-18 RX ORDER — MAGNESIUM SULFATE HEPTAHYDRATE 40 MG/ML
2 INJECTION, SOLUTION INTRAVENOUS ONCE
Status: DISCONTINUED | OUTPATIENT
Start: 2023-03-18 | End: 2023-03-18

## 2023-03-18 RX ORDER — POTASSIUM CHLORIDE 7.45 MG/ML
10 INJECTION INTRAVENOUS ONCE
Status: COMPLETED | OUTPATIENT
Start: 2023-03-18 | End: 2023-03-18

## 2023-03-18 RX ORDER — SODIUM CHLORIDE, SODIUM LACTATE, POTASSIUM CHLORIDE, CALCIUM CHLORIDE 600; 310; 30; 20 MG/100ML; MG/100ML; MG/100ML; MG/100ML
INJECTION, SOLUTION INTRAVENOUS CONTINUOUS
Status: DISCONTINUED | OUTPATIENT
Start: 2023-03-18 | End: 2023-03-19

## 2023-03-18 RX ORDER — MAGNESIUM SULFATE HEPTAHYDRATE 40 MG/ML
2 INJECTION, SOLUTION INTRAVENOUS ONCE
Status: COMPLETED | OUTPATIENT
Start: 2023-03-18 | End: 2023-03-18

## 2023-03-18 RX ORDER — POTASSIUM CHLORIDE 7.45 MG/ML
10 INJECTION INTRAVENOUS ONCE
Status: DISCONTINUED | OUTPATIENT
Start: 2023-03-18 | End: 2023-03-18

## 2023-03-18 RX ORDER — CEFEPIME HYDROCHLORIDE 1 G/50ML
1 INJECTION, SOLUTION INTRAVENOUS ONCE
Status: COMPLETED | OUTPATIENT
Start: 2023-03-18 | End: 2023-03-18

## 2023-03-18 RX ORDER — VANCOMYCIN HCL IN 5 % DEXTROSE 1G/250ML
1000 PLASTIC BAG, INJECTION (ML) INTRAVENOUS ONCE
Status: COMPLETED | OUTPATIENT
Start: 2023-03-18 | End: 2023-03-19

## 2023-03-18 RX ADMIN — IOHEXOL 100 ML: 350 INJECTION, SOLUTION INTRAVENOUS at 11:03

## 2023-03-18 RX ADMIN — VANCOMYCIN HYDROCHLORIDE 1000 MG: 1 INJECTION, POWDER, LYOPHILIZED, FOR SOLUTION INTRAVENOUS at 11:03

## 2023-03-18 RX ADMIN — SODIUM CHLORIDE, SODIUM LACTATE, POTASSIUM CHLORIDE, AND CALCIUM CHLORIDE: .6; .31; .03; .02 INJECTION, SOLUTION INTRAVENOUS at 08:03

## 2023-03-18 RX ADMIN — MAGNESIUM SULFATE HEPTAHYDRATE 2 G: 40 INJECTION, SOLUTION INTRAVENOUS at 09:03

## 2023-03-18 RX ADMIN — SODIUM CHLORIDE, SODIUM LACTATE, POTASSIUM CHLORIDE, AND CALCIUM CHLORIDE 1770 ML: .6; .31; .03; .02 INJECTION, SOLUTION INTRAVENOUS at 10:03

## 2023-03-18 RX ADMIN — POTASSIUM CHLORIDE 10 MEQ: 7.46 INJECTION, SOLUTION INTRAVENOUS at 10:03

## 2023-03-18 RX ADMIN — CEFEPIME HYDROCHLORIDE 1 G: 1 INJECTION, SOLUTION INTRAVENOUS at 10:03

## 2023-03-18 RX ADMIN — ONDANSETRON 4 MG: 2 INJECTION INTRAMUSCULAR; INTRAVENOUS at 08:03

## 2023-03-19 PROBLEM — J18.9 PNEUMONIA: Status: ACTIVE | Noted: 2023-03-19

## 2023-03-19 LAB
ALBUMIN SERPL BCP-MCNC: 2.9 G/DL (ref 3.5–5.2)
ALP SERPL-CCNC: 38 U/L (ref 55–135)
ALT SERPL W/O P-5'-P-CCNC: 12 U/L (ref 10–44)
ANION GAP SERPL CALC-SCNC: 9 MMOL/L (ref 8–16)
AST SERPL-CCNC: 14 U/L (ref 10–40)
BASOPHILS # BLD AUTO: 0.02 K/UL (ref 0–0.2)
BASOPHILS NFR BLD: 0.2 % (ref 0–1.9)
BILIRUB SERPL-MCNC: 0.7 MG/DL (ref 0.1–1)
BUN SERPL-MCNC: 18 MG/DL (ref 8–23)
CALCIUM SERPL-MCNC: 8 MG/DL (ref 8.7–10.5)
CHLORIDE SERPL-SCNC: 100 MMOL/L (ref 95–110)
CO2 SERPL-SCNC: 25 MMOL/L (ref 23–29)
CREAT SERPL-MCNC: 0.9 MG/DL (ref 0.5–1.4)
DIFFERENTIAL METHOD: ABNORMAL
EOSINOPHIL # BLD AUTO: 0 K/UL (ref 0–0.5)
EOSINOPHIL NFR BLD: 0 % (ref 0–8)
ERYTHROCYTE [DISTWIDTH] IN BLOOD BY AUTOMATED COUNT: 14.9 % (ref 11.5–14.5)
EST. GFR  (NO RACE VARIABLE): >60 ML/MIN/1.73 M^2
GLUCOSE SERPL-MCNC: 142 MG/DL (ref 70–110)
HCT VFR BLD AUTO: 32.2 % (ref 37–48.5)
HGB BLD-MCNC: 10.1 G/DL (ref 12–16)
IMM GRANULOCYTES # BLD AUTO: 0.04 K/UL (ref 0–0.04)
IMM GRANULOCYTES NFR BLD AUTO: 0.4 % (ref 0–0.5)
LYMPHOCYTES # BLD AUTO: 0.9 K/UL (ref 1–4.8)
LYMPHOCYTES NFR BLD: 9.5 % (ref 18–48)
MAGNESIUM SERPL-MCNC: 1.7 MG/DL (ref 1.6–2.6)
MCH RBC QN AUTO: 29.1 PG (ref 27–31)
MCHC RBC AUTO-ENTMCNC: 31.4 G/DL (ref 32–36)
MCV RBC AUTO: 93 FL (ref 82–98)
MONOCYTES # BLD AUTO: 0.9 K/UL (ref 0.3–1)
MONOCYTES NFR BLD: 9.4 % (ref 4–15)
MRSA SCREEN BY PCR: NEGATIVE
NEUTROPHILS # BLD AUTO: 7.9 K/UL (ref 1.8–7.7)
NEUTROPHILS NFR BLD: 80.5 % (ref 38–73)
NRBC BLD-RTO: 0 /100 WBC
PLATELET # BLD AUTO: 173 K/UL (ref 150–450)
PMV BLD AUTO: 9.9 FL (ref 9.2–12.9)
POTASSIUM SERPL-SCNC: 3.9 MMOL/L (ref 3.5–5.1)
PROT SERPL-MCNC: 5.3 G/DL (ref 6–8.4)
RBC # BLD AUTO: 3.47 M/UL (ref 4–5.4)
SODIUM SERPL-SCNC: 134 MMOL/L (ref 136–145)
WBC # BLD AUTO: 9.85 K/UL (ref 3.9–12.7)

## 2023-03-19 PROCEDURE — 85025 COMPLETE CBC W/AUTO DIFF WBC: CPT | Performed by: INTERNAL MEDICINE

## 2023-03-19 PROCEDURE — 94664 DEMO&/EVAL PT USE INHALER: CPT

## 2023-03-19 PROCEDURE — G0378 HOSPITAL OBSERVATION PER HR: HCPCS

## 2023-03-19 PROCEDURE — 36415 COLL VENOUS BLD VENIPUNCTURE: CPT | Performed by: INTERNAL MEDICINE

## 2023-03-19 PROCEDURE — 27000221 HC OXYGEN, UP TO 24 HOURS

## 2023-03-19 PROCEDURE — 96376 TX/PRO/DX INJ SAME DRUG ADON: CPT

## 2023-03-19 PROCEDURE — 83735 ASSAY OF MAGNESIUM: CPT | Performed by: INTERNAL MEDICINE

## 2023-03-19 PROCEDURE — 92610 EVALUATE SWALLOWING FUNCTION: CPT

## 2023-03-19 PROCEDURE — 96366 THER/PROPH/DIAG IV INF ADDON: CPT

## 2023-03-19 PROCEDURE — 99900031 HC PATIENT EDUCATION (STAT)

## 2023-03-19 PROCEDURE — 63600175 PHARM REV CODE 636 W HCPCS: Performed by: INTERNAL MEDICINE

## 2023-03-19 PROCEDURE — 87205 SMEAR GRAM STAIN: CPT | Performed by: INTERNAL MEDICINE

## 2023-03-19 PROCEDURE — 96372 THER/PROPH/DIAG INJ SC/IM: CPT | Performed by: INTERNAL MEDICINE

## 2023-03-19 PROCEDURE — 25000003 PHARM REV CODE 250: Performed by: INTERNAL MEDICINE

## 2023-03-19 PROCEDURE — 87641 MR-STAPH DNA AMP PROBE: CPT | Performed by: INTERNAL MEDICINE

## 2023-03-19 PROCEDURE — 99900035 HC TECH TIME PER 15 MIN (STAT)

## 2023-03-19 PROCEDURE — 25000003 PHARM REV CODE 250: Performed by: HOSPITALIST

## 2023-03-19 PROCEDURE — 80053 COMPREHEN METABOLIC PANEL: CPT | Performed by: INTERNAL MEDICINE

## 2023-03-19 PROCEDURE — 63600175 PHARM REV CODE 636 W HCPCS: Performed by: EMERGENCY MEDICINE

## 2023-03-19 PROCEDURE — 87070 CULTURE OTHR SPECIMN AEROBIC: CPT | Performed by: INTERNAL MEDICINE

## 2023-03-19 PROCEDURE — 94799 UNLISTED PULMONARY SVC/PX: CPT

## 2023-03-19 PROCEDURE — 96367 TX/PROPH/DG ADDL SEQ IV INF: CPT

## 2023-03-19 PROCEDURE — 25000003 PHARM REV CODE 250

## 2023-03-19 PROCEDURE — 94761 N-INVAS EAR/PLS OXIMETRY MLT: CPT

## 2023-03-19 RX ORDER — NALOXONE HCL 0.4 MG/ML
0.02 VIAL (ML) INJECTION
Status: DISCONTINUED | OUTPATIENT
Start: 2023-03-19 | End: 2023-03-21 | Stop reason: HOSPADM

## 2023-03-19 RX ORDER — MAGNESIUM SULFATE HEPTAHYDRATE 40 MG/ML
4 INJECTION, SOLUTION INTRAVENOUS
Status: DISCONTINUED | OUTPATIENT
Start: 2023-03-19 | End: 2023-03-21 | Stop reason: HOSPADM

## 2023-03-19 RX ORDER — IBUPROFEN 200 MG
16 TABLET ORAL
Status: DISCONTINUED | OUTPATIENT
Start: 2023-03-19 | End: 2023-03-21 | Stop reason: HOSPADM

## 2023-03-19 RX ORDER — CHLORHEXIDINE GLUCONATE ORAL RINSE 1.2 MG/ML
15 SOLUTION DENTAL 2 TIMES DAILY
Status: DISCONTINUED | OUTPATIENT
Start: 2023-03-19 | End: 2023-03-21 | Stop reason: HOSPADM

## 2023-03-19 RX ORDER — MAGNESIUM SULFATE HEPTAHYDRATE 40 MG/ML
2 INJECTION, SOLUTION INTRAVENOUS
Status: DISCONTINUED | OUTPATIENT
Start: 2023-03-19 | End: 2023-03-21 | Stop reason: HOSPADM

## 2023-03-19 RX ORDER — GLUCAGON 1 MG
1 KIT INJECTION
Status: DISCONTINUED | OUTPATIENT
Start: 2023-03-19 | End: 2023-03-21 | Stop reason: HOSPADM

## 2023-03-19 RX ORDER — SODIUM CHLORIDE, SODIUM LACTATE, POTASSIUM CHLORIDE, CALCIUM CHLORIDE 600; 310; 30; 20 MG/100ML; MG/100ML; MG/100ML; MG/100ML
INJECTION, SOLUTION INTRAVENOUS CONTINUOUS
Status: DISCONTINUED | OUTPATIENT
Start: 2023-03-19 | End: 2023-03-21 | Stop reason: HOSPADM

## 2023-03-19 RX ORDER — SODIUM CHLORIDE 0.9 % (FLUSH) 0.9 %
10 SYRINGE (ML) INJECTION EVERY 12 HOURS PRN
Status: DISCONTINUED | OUTPATIENT
Start: 2023-03-19 | End: 2023-03-21 | Stop reason: HOSPADM

## 2023-03-19 RX ORDER — IBUPROFEN 200 MG
24 TABLET ORAL
Status: DISCONTINUED | OUTPATIENT
Start: 2023-03-19 | End: 2023-03-21 | Stop reason: HOSPADM

## 2023-03-19 RX ORDER — GUAIFENESIN 600 MG/1
1200 TABLET, EXTENDED RELEASE ORAL 2 TIMES DAILY
Status: DISCONTINUED | OUTPATIENT
Start: 2023-03-19 | End: 2023-03-21 | Stop reason: HOSPADM

## 2023-03-19 RX ORDER — POTASSIUM CHLORIDE 7.45 MG/ML
40 INJECTION INTRAVENOUS ONCE
Status: COMPLETED | OUTPATIENT
Start: 2023-03-19 | End: 2023-03-19

## 2023-03-19 RX ORDER — MAGNESIUM SULFATE HEPTAHYDRATE 40 MG/ML
2 INJECTION, SOLUTION INTRAVENOUS ONCE
Status: COMPLETED | OUTPATIENT
Start: 2023-03-19 | End: 2023-03-19

## 2023-03-19 RX ORDER — POTASSIUM CHLORIDE 20 MEQ/1
40 TABLET, EXTENDED RELEASE ORAL EVERY 4 HOURS
Status: COMPLETED | OUTPATIENT
Start: 2023-03-19 | End: 2023-03-19

## 2023-03-19 RX ORDER — LOPERAMIDE HYDROCHLORIDE 2 MG/1
2 CAPSULE ORAL 4 TIMES DAILY PRN
Status: DISCONTINUED | OUTPATIENT
Start: 2023-03-19 | End: 2023-03-21 | Stop reason: HOSPADM

## 2023-03-19 RX ORDER — ACETAMINOPHEN 325 MG/1
650 TABLET ORAL EVERY 6 HOURS PRN
Status: DISCONTINUED | OUTPATIENT
Start: 2023-03-19 | End: 2023-03-21 | Stop reason: HOSPADM

## 2023-03-19 RX ORDER — ONDANSETRON 2 MG/ML
4 INJECTION INTRAMUSCULAR; INTRAVENOUS EVERY 4 HOURS PRN
Status: DISCONTINUED | OUTPATIENT
Start: 2023-03-19 | End: 2023-03-21 | Stop reason: HOSPADM

## 2023-03-19 RX ORDER — MUPIROCIN 20 MG/G
OINTMENT TOPICAL 2 TIMES DAILY
Status: DISCONTINUED | OUTPATIENT
Start: 2023-03-19 | End: 2023-03-21 | Stop reason: HOSPADM

## 2023-03-19 RX ORDER — ENOXAPARIN SODIUM 100 MG/ML
40 INJECTION SUBCUTANEOUS EVERY 24 HOURS
Status: DISCONTINUED | OUTPATIENT
Start: 2023-03-19 | End: 2023-03-21 | Stop reason: HOSPADM

## 2023-03-19 RX ADMIN — POTASSIUM CHLORIDE 40 MEQ: 1500 TABLET, EXTENDED RELEASE ORAL at 01:03

## 2023-03-19 RX ADMIN — MAGNESIUM SULFATE HEPTAHYDRATE 2 G: 40 INJECTION, SOLUTION INTRAVENOUS at 01:03

## 2023-03-19 RX ADMIN — SODIUM CHLORIDE, SODIUM LACTATE, POTASSIUM CHLORIDE, AND CALCIUM CHLORIDE: .6; .31; .03; .02 INJECTION, SOLUTION INTRAVENOUS at 03:03

## 2023-03-19 RX ADMIN — CEFTRIAXONE 1 G: 1 INJECTION, SOLUTION INTRAVENOUS at 07:03

## 2023-03-19 RX ADMIN — MAGNESIUM SULFATE HEPTAHYDRATE 2 G: 40 INJECTION, SOLUTION INTRAVENOUS at 06:03

## 2023-03-19 RX ADMIN — AZITHROMYCIN MONOHYDRATE 500 MG: 500 INJECTION, POWDER, LYOPHILIZED, FOR SOLUTION INTRAVENOUS at 08:03

## 2023-03-19 RX ADMIN — GUAIFENESIN 1200 MG: 600 TABLET, EXTENDED RELEASE ORAL at 08:03

## 2023-03-19 RX ADMIN — POTASSIUM CHLORIDE 40 MEQ: 1500 TABLET, EXTENDED RELEASE ORAL at 06:03

## 2023-03-19 RX ADMIN — CHLORHEXIDINE GLUCONATE 15 ML: 1.2 RINSE ORAL at 08:03

## 2023-03-19 RX ADMIN — ACETAMINOPHEN 650 MG: 325 TABLET ORAL at 08:03

## 2023-03-19 RX ADMIN — POTASSIUM CHLORIDE 10 MEQ: 7.46 INJECTION, SOLUTION INTRAVENOUS at 01:03

## 2023-03-19 RX ADMIN — ENOXAPARIN SODIUM 40 MG: 100 INJECTION SUBCUTANEOUS at 05:03

## 2023-03-19 RX ADMIN — MUPIROCIN 1 G: 20 OINTMENT TOPICAL at 08:03

## 2023-03-19 NOTE — PLAN OF CARE
03/19/23 0200   Patient Assessment/Suction   Level of Consciousness (AVPU) alert   Respiratory Effort Unlabored   Expansion/Accessory Muscles/Retractions expansion symmetric   All Lung Fields Breath Sounds coarse   Rhythm/Pattern, Respiratory depth regular;pattern regular   Cough Frequency infrequent   Cough Type good;nonproductive   PRE-TX-O2   Device (Oxygen Therapy) nasal cannula   $ Is the patient on Low Flow Oxygen? Yes   Flow (L/min) 2   Education   $ Education DME Oxygen;15 min

## 2023-03-19 NOTE — ED NOTES
PT ASSISTED TO BATHROOM. PT AMB WELL ON OWN. BTB MONITOR ON. PT MADE COMFORTABLE AND CALL LIGHT IN REACH. NO NEEDS VOICED AT THIS TIME

## 2023-03-19 NOTE — PLAN OF CARE
Counts include 234 beds at the [facility]  Initial Discharge Assessment       Primary Care Provider: Idalia Greco MD    Admission Diagnosis: Pneumonia of right lung due to infectious organism, unspecified part of lung [J18.9]    Admission Date: 3/18/2023  Expected Discharge Date:      attempted initial assessment at bedside, patient was asleep and hard to wake due to medications. SW contacted spouse/Porter [phone] via phone. Spouse confirmed patients demographics, PCP, and preferred pharmacy. Spouse confirmed patient has no DME, no Home Health, No Outpatient PT, No Dialysis, and No Coumadin.    Discharge Barriers Identified: None    Payor: [employer] MANAGED MEDICARE / Plan: [employer] MEDICARE HMO / Product Type: Capitation /     Extended Emergency Contact Information  Primary Emergency Contact: TRISTON LE  Mobile Phone: [phone]  Relation: Daughter  Secondary Emergency Contact: Porter Otero  Address: [address] [address] [facility]  Home Phone: [phone]  Mobile Phone: [phone]  Relation: Spouse  Preferred language: English   needed? No    Discharge Plan A: Home with family  Discharge Plan B: Home with family      [id number] - [place], [place] - [address] [facility]  [address]  [facility] [address]  Phone: [phone] Fax: [phone]      Initial Assessment (most recent)       Adult Discharge Assessment - 03/19/23 1450          Discharge Assessment    Assessment Type Discharge Planning Assessment     Confirmed/corrected address, phone number and insurance Yes     Confirmed Demographics Correct on Facesheet     Source of Information family     If unable to respond/provide information was family/caregiver contacted? Yes     Contact Name/Number Spouse/Porter [phone]     Does patient/caregiver understand observation status Yes     Communicated ALYCIA with patient/caregiver Date not available/Unable to determine      People in Home spouse     Facility Arrived From: Home     Do you expect to return to your current living situation? Yes     Do you have help at home or someone to help you manage your care at home? Yes     Who are your caregiver(s) and their phone number(s)? Guillermo/Porter [phone]     Prior to hospitilization cognitive status: Unable to Assess     Current cognitive status: Unable to Assess     Home Accessibility wheelchair accessible     Home Layout Able to live on 1st floor     Equipment Currently Used at Home none     Readmission within 30 days? No     Patient currently being followed by outpatient case management? No     Do you currently have service(s) that help you manage your care at home? No     Do you take prescription medications? Yes     Do you have prescription coverage? Yes     Coverage [employer]     Do you have any problems affording any of your prescribed medications? No     Is the patient taking medications as prescribed? yes     Who is going to help you get home at discharge? Guillermo/Porter [phone]     How do you get to doctors appointments? family or friend will provide     Are you on dialysis? No     Do you take coumadin? No     Discharge Plan A Home with family     Discharge Plan B Home with family     Discharge Plan discussed with: Spouse/sig other     Name(s) and Number(s) Guillermo/Porter [phone]     Discharge Barriers Identified None        OTHER    Name(s) of People in Home Guillermo/Porter [phone]

## 2023-03-19 NOTE — H&P
"[facility] - Emergency Dept    History & Physical      Patient Name: Alexa Otero  MRN: [id number]  Admission Date: 3/18/2023  Attending Physician: Ema Newman MD   Primary Care Provider: Idalia Greco MD         Patient information was obtained from patient and ER records.     Subjective:     Principal Problem:Pneumonia    Chief Complaint:   Chief Complaint   Patient presents with    Nausea    Vomiting    Diarrhea        HPI: A  82 y/o female has a history of hypertension, GERD, hyperlipidemia, diabetes, recurrent pneumonia    She presents complaining of body aches, fatigue, chills, poor appetite, nausea vomiting and diarrhea.  The patient has had symptoms over the past 24-36 hours.      She reports 10-12 episodes of emesis and several episodes of loose watery stool.  She denies any abdominal pain or gastrointestinal bleeding.     She has chronic cough which has been progressive, is to be evaluated by pulmonology as an outpatient for this.     In the ER patient had electrolyte deficiencies potassium and magnesium.  Her procalcitonin was elevated and a CT scan showed "Focal area of airspace opacity/consolidation involving the posterior segment right lower lobe no significantly changed when compared to prior study."    10 pt ros o/w negative       Past Medical History:   Diagnosis Date    Arthritis     Cataract     Colon polyp     Diabetes mellitus type II 2012    Encounter for blood transfusion     Extra-ovarian endometrioid adenocarcinoma 2020    GERD (gastroesophageal reflux disease)     Hyperlipidemia     Hypertension     Macular degeneration     PONV (postoperative nausea and vomiting)     Squamous cell carcinoma 1980's    precancer of cervix       Past Surgical History:   Procedure Laterality Date    AUGMENTATION OF BREAST      CHOLECYSTECTOMY      COLONOSCOPY      ESOPHAGOGASTRODUODENOSCOPY N/A 06/20/2018    Procedure: EGD (ESOPHAGOGASTRODUODENOSCOPY);  Surgeon: Sabino Stephenson MD;  " Location: [facility] ENDO;  Service: Endoscopy;  Laterality: N/A;    HYSTERECTOMY      partial    INJECTION, SACROILIAC JOINT Right 1/26/2023    Procedure: INJECTION,SACROILIAC JOINT;  Surgeon: Agustin Robles MD;  Location: [facility] OR;  Service: Pain Management;  Laterality: Right;    INSERTION OF TUNNELED CENTRAL VENOUS CATHETER (CVC) WITH SUBCUTANEOUS PORT Right 10/19/2020    Procedure: INSERTION, PORT-A-CATH;  Surgeon: Misti Mcfarland MD;  Location: [facility] OR;  Service: General;  Laterality: Right;    INTRAMEDULLARY RODDING OF TROCHANTER OF FEMUR Right 11/28/2021    Procedure: INSERTION, INTRAMEDULLARY LUC, FEMUR, TROCHANTER/RIGHT TFN DR FAJARDO NOTIFIED REP;  Surgeon: Kp Fajardo MD;  Location: [facility] OR;  Service: Orthopedics;  Laterality: Right;  SKIP    ROBOT-ASSISTED LAPAROSCOPIC LYMPHADENECTOMY USING DA NELLA XI N/A 09/21/2020    Procedure: XI ROBOTIC LYMPHADENECTOMY-pelvic and kell-aortic;  Surgeon: Altagracia Ray MD;  Location: [facility] OR;  Service: OB/GYN;  Laterality: N/A;    ROBOT-ASSISTED LAPAROSCOPIC OMENTECTOMY USING DA NELLA XI N/A 09/21/2020    Procedure: XI ROBOTIC OMENTECTOMY;  Surgeon: Altagracia Ray MD;  Location: [facility] OR;  Service: OB/GYN;  Laterality: N/A;    ROBOT-ASSISTED LAPAROSCOPIC SALPINGO-OOPHORECTOMY USING DA NELLA XI Bilateral 09/21/2020    Procedure: XI ROBOTIC SALPINGO-OOPHORECTOMY;  Surgeon: Altagracia Ray MD;  Location: [facility] OR;  Service: OB/GYN;  Laterality: Bilateral;    UPPER GASTROINTESTINAL ENDOSCOPY         Review of patient's allergies indicates:  No Known Allergies    No current facility-administered medications on file prior to encounter.     Current Outpatient Medications on File Prior to Encounter   Medication Sig    albuterol (PROVENTIL/VENTOLIN HFA) 90 mcg/actuation inhaler INHALE 2 PUFFS INTO THE LUNGS FOUR TIMES DAILY    benazepriL (LOTENSIN) 40 MG tablet TAKE 1 TABLET(40 MG) BY MOUTH EVERY DAY    chlorthalidone (HYGROTEN) 25 MG Tab Take 1 tablet (25 mg  total) by mouth once daily.    gabapentin (NEURONTIN) 100 MG capsule TAKE 1 CAPSULE(100 MG) BY MOUTH TWICE DAILY    metFORMIN (GLUCOPHAGE) 500 MG tablet TAKE 1 TABLET(500 MG) BY MOUTH TWICE DAILY WITH MEALS    omeprazole (PRILOSEC) 40 MG capsule TAKE 1 CAPSULE(40 MG) BY MOUTH EVERY DAY    simvastatin (ZOCOR) 40 MG tablet TAKE 1 TABLET(40 MG) BY MOUTH EVERY EVENING    VIT A/VIT C/VIT E/ZINC/COPPER (ICAPS AREDS ORAL) Take 1 capsule by mouth 2 (two) times a day.     levoFLOXacin (LEVAQUIN) 500 MG tablet Take 1 tablet (500 mg total) by mouth once daily.    ondansetron (ZOFRAN-ODT) 4 MG TbDL Take 1 tablet (4 mg total) by mouth every 6 (six) hours as needed (nausea and vomiting).     Family History       Problem Relation (Age of Onset)    Breast cancer Maternal Grandmother, Paternal Grandmother    Cancer Mother (57), Father (56)    Colon polyps Daughter    Melanoma Brother    Skin cancer Sister, Brother, Brother          Tobacco Use    Smoking status: Former     Packs/day: 1.00     Years: 20.00     Pack years: 20.00     Types: Cigarettes    Smokeless tobacco: Never    Tobacco comments:     quit 40 yrs ago   Substance and Sexual Activity    Alcohol use: Yes     Alcohol/week: 2.0 standard drinks     Types: 2 Glasses of wine per week     Comment: occasional    Drug use: No    Sexual activity: Not Currently     Partners: Male     Review of Systems  Objective:     Vital Signs (Most Recent):  Temp: 98.8 °F (37.1 °C) (03/18/23 2145)  Pulse: (!) 114 (03/19/23 0033)  Resp: 19 (03/19/23 0033)  BP: (!) 148/67 (03/19/23 0033)  SpO2: 100 % (03/19/23 0033)   Vital Signs (24h Range):  Temp:  [98.8 °F (37.1 °C)-99.3 °F (37.4 °C)] 98.8 °F (37.1 °C)  Pulse:  [109-130] 114  Resp:  [17-22] 19  SpO2:  [95 %-100 %] 100 %  BP: (116-148)/(56-67) 148/67     Weight: 59 kg (130 lb)  Body mass index is 23.03 kg/m².    Physical Exam  Constitutional:       Appearance: Normal appearance. She is normal weight.   HENT:      Head: Normocephalic and  atraumatic.      Right Ear: External ear normal.      Left Ear: External ear normal.      Nose: Nose normal.      Mouth/Throat:      Mouth: Mucous membranes are moist.      Pharynx: Oropharynx is clear.   Eyes:      Extraocular Movements: Extraocular movements intact.      Conjunctiva/sclera: Conjunctivae normal.      Pupils: Pupils are equal, round, and reactive to light.   Cardiovascular:      Rate and Rhythm: Normal rate and regular rhythm.      Pulses: Normal pulses.      Heart sounds: Normal heart sounds.   Pulmonary:      Effort: Pulmonary effort is normal.      Breath sounds: Normal breath sounds.      Comments: R posterior crackles  Abdominal:      General: Abdomen is flat. Bowel sounds are normal.      Palpations: Abdomen is soft.   Musculoskeletal:         General: Normal range of motion.      Cervical back: Normal range of motion.   Skin:     General: Skin is warm.      Capillary Refill: Capillary refill takes less than 2 seconds.   Neurological:      General: No focal deficit present.      Mental Status: She is alert and oriented to person, place, and time. Mental status is at baseline.   Psychiatric:         Mood and Affect: Mood normal.         Behavior: Behavior normal.         CRANIAL NERVES     CN III, IV, VI   Pupils are equal, round, and reactive to light.    Significant Labs: All pertinent labs within the past 24 hours have been reviewed.    Significant Imaging: I have reviewed all pertinent imaging results/findings within the past 24 hours.    Assessment/Plan:     Active Diagnoses:    Diagnosis Date Noted POA    PRINCIPAL PROBLEM:  Pneumonia [J18.9] 03/19/2023 Unknown      Problems Resolved During this Admission:     VTE Risk Mitigation (From admission, onward)      None          RLL PNA (CT chest w/ consolidation and elevated PCT)  Hx chronic cough  -Rocephin azithromycin   -check sp culture, urine legionella, flu, blood cultures  -SLP eval, modified barium swallow given hx Olu's   -OP f/u  with pulm this month for chronic cough  -Acapella, mucinex   -The CT chest in February 14th shows a similar right lower lobe infiltrate, will need to repeat CT scan as an outpatient to ensure resolution      Hypokalemia/Hypomagnesemia  -S/t n/v/d  -Replace/monitor  -Check GI PCR  -tele    lovenox          Mikcey Mai MD  Department of Hospital Medicine   [facility] - Emergency Dept

## 2023-03-19 NOTE — PLAN OF CARE
03/19/23 1453   ALBRECHT Message   Medicare Outpatient and Observation Notification regarding financial responsibility Explained to patient/caregiver;Signed/date by patient/caregiver   Date ALBRECHT was signed 03/19/23   Time ALBRECHT was signed [id number]

## 2023-03-19 NOTE — ED PROVIDER NOTES
Encounter Date: 3/18/2023       History     Chief Complaint   Patient presents with    Nausea    Vomiting    Diarrhea     A  82 y/o female with a history of hypertension, GERD, hyperlipidemia, diabetes presents complaining of body aches, chills, nausea vomiting and diarrhea.  The patient has had symptoms over the past 24-36 hours.  She is had generalized malaise fatigue and decreased energy level today as well as no appetite.  She reports 10-12 episodes of emesis and several episodes of loose watery stool.  She denies any abdominal pain or gastrointestinal bleeding.  She has a chronic ongoing cough for which she has been evaluated by pulmonology, has had several bouts of pneumonia in the past but is not on oxygen.  She has had worsening of this cough since the onset of the body aches chills nausea vomiting and other GI symptoms.  She denies any headache, neck pain or stiffness.  She denies chest pain.  She did not feel more short of breath than normal.  She denies any dysuria, frequency urgency or flank pain.  She has felt lightheaded but denies syncope or near-syncope and denies any focal weakness numbness tingling or paresthesias.  Symptoms are moderate in intensity with no exacerbating or alleviating factors.  She had an elevated temperature of 99.9° in the emergency room.  She would not had any Tylenol or ibuprofen today.      Review of patient's allergies indicates:  No Known Allergies  Past Medical History:   Diagnosis Date    Arthritis     Cataract     Colon polyp     Diabetes mellitus type II 2012    Encounter for blood transfusion     Extra-ovarian endometrioid adenocarcinoma 2020    GERD (gastroesophageal reflux disease)     Hyperlipidemia     Hypertension     Macular degeneration     PONV (postoperative nausea and vomiting)     Squamous cell carcinoma 1980's    precancer of cervix     Past Surgical History:   Procedure Laterality Date    AUGMENTATION OF BREAST      CHOLECYSTECTOMY      COLONOSCOPY       ESOPHAGOGASTRODUODENOSCOPY N/A 06/20/2018    Procedure: EGD (ESOPHAGOGASTRODUODENOSCOPY);  Surgeon: Sabino Stephenson MD;  Location: [facility];  Service: Endoscopy;  Laterality: N/A;    HYSTERECTOMY      partial    INJECTION, SACROILIAC JOINT Right 1/26/2023    Procedure: INJECTION,SACROILIAC JOINT;  Surgeon: Agustin Robles MD;  [facility];  Service: Pain Management;  Laterality: Right;    INSERTION OF TUNNELED CENTRAL VENOUS CATHETER (CVC) WITH SUBCUTANEOUS PORT Right 10/19/2020    Procedure: INSERTION, PORT-A-CATH;  Surgeon: Misti Mcfarland MD;  Location: [facility] OR;  Service: General;  Laterality: Right;    INTRAMEDULLARY RODDING OF TROCHANTER OF FEMUR Right 11/28/2021    Procedure: INSERTION, INTRAMEDULLARY LUC, FEMUR, TROCHANTER/RIGHT TFN DR FAJARDO NOTIFIED REP;  Surgeon: Kp Fajardo MD;  Location: [facility];  Service: Orthopedics;  Laterality: Right;  SKIP    ROBOT-ASSISTED LAPAROSCOPIC LYMPHADENECTOMY USING DA NELLA XI N/A 09/21/2020    Procedure: XI ROBOTIC LYMPHADENECTOMY-pelvic and kell-aortic;  Surgeon: Altagracia Ray MD;  Location: [facility] OR;  Service: OB/GYN;  Laterality: N/A;    ROBOT-ASSISTED LAPAROSCOPIC OMENTECTOMY USING DA NELLA XI N/A 09/21/2020    Procedure: XI ROBOTIC OMENTECTOMY;  Surgeon: Altagracia Ray MD;  Location: [facility] OR;  Service: OB/GYN;  Laterality: N/A;    ROBOT-ASSISTED LAPAROSCOPIC SALPINGO-OOPHORECTOMY USING DA NELLA XI Bilateral 09/21/2020    Procedure: XI ROBOTIC SALPINGO-OOPHORECTOMY;  Surgeon: Altagracia Ray MD;  Location: [facility] OR;  Service: OB/GYN;  Laterality: Bilateral;    UPPER GASTROINTESTINAL ENDOSCOPY       Family History   Problem Relation Age of Onset    Cancer Mother 57        lung    Cancer Father 56        oral    Skin cancer Sister     Skin cancer Brother     Melanoma Brother     Skin cancer Brother     Breast cancer Maternal Grandmother     Breast cancer Paternal Grandmother     Colon polyps Daughter     Psoriasis Neg Hx     Lupus Neg Hx      Eczema Neg Hx     Colon cancer Neg Hx     Crohn's disease Neg Hx     Esophageal cancer Neg Hx     Stomach cancer Neg Hx     Ulcerative colitis Neg Hx      Social History     Tobacco Use    Smoking status: Former     Packs/day: 1.00     Years: 20.00     Pack years: 20.00     Types: Cigarettes    Smokeless tobacco: Never    Tobacco comments:     quit 40 yrs ago   Substance Use Topics    Alcohol use: Yes     Alcohol/week: 2.0 standard drinks     Types: 2 Glasses of wine per week     Comment: occasional    Drug use: No     Review of Systems   Constitutional:  Positive for activity change, appetite change, chills, fatigue and fever.   HENT: Negative.  Negative for congestion, ear pain, rhinorrhea, sore throat and trouble swallowing.    Eyes: Negative.  Negative for photophobia, pain, redness and visual disturbance.   Respiratory:  Positive for cough. Negative for chest tightness, shortness of breath and wheezing.    Cardiovascular: Negative.  Negative for chest pain, palpitations and leg swelling.   Gastrointestinal:  Positive for diarrhea, nausea and vomiting. Negative for abdominal distention, abdominal pain, blood in stool and constipation.   Endocrine: Negative.    Genitourinary: Negative.  Negative for decreased urine volume, difficulty urinating, dysuria, flank pain, frequency, pelvic pain and urgency.   Musculoskeletal: Negative.  Negative for arthralgias, back pain, gait problem, myalgias, neck pain and neck stiffness.   Skin: Negative.  Negative for rash.   Neurological:  Positive for weakness (generalized nonfocal weakness and fatigue). Negative for dizziness, syncope, facial asymmetry, speech difficulty, light-headedness, numbness and headaches.   Hematological:  Does not bruise/bleed easily.   Psychiatric/Behavioral: Negative.  Negative for confusion.    All other systems reviewed and are negative.    Physical Exam     Initial Vitals [03/18/23 2018]   BP Pulse Resp Temp SpO2   137/65 (!) 130 18 99.3 °F (37.4  °C) 95 %      MAP       --         Physical Exam    Nursing note and vitals reviewed.  Constitutional: She is active and cooperative. She appears ill. No distress.   HENT:   Head: Normocephalic and atraumatic.   Right Ear: Tympanic membrane normal.   Left Ear: Tympanic membrane normal.   Nose: Nose normal. No mucosal edema.   Mouth/Throat: Uvula is midline, oropharynx is clear and moist and mucous membranes are normal. No oral lesions. No uvula swelling. No oropharyngeal exudate, posterior oropharyngeal edema or posterior oropharyngeal erythema.   Eyes: Conjunctivae, EOM and lids are normal. Pupils are equal, round, and reactive to light. No scleral icterus.   Neck: Trachea normal and phonation normal. Neck supple. No thyroid mass and no thyromegaly present. No stridor present. No JVD present.   Normal range of motion.   Full passive range of motion without pain.     Cardiovascular:  Normal rate, regular rhythm, normal heart sounds, intact distal pulses and normal pulses.     Exam reveals no gallop, no distant heart sounds and no friction rub.       No murmur heard.  Pulmonary/Chest: Effort normal. No accessory muscle usage or stridor. No tachypnea. No respiratory distress. She has decreased breath sounds in the right middle field and the right lower field. She has no wheezes. She has rhonchi in the right middle field and the right lower field. She has no rales.   Pulse ox 88% on room air, placed on 2 L of oxygen with increased to 96%.   Abdominal: Abdomen is soft. Bowel sounds are normal. She exhibits no distension, no pulsatile midline mass and no mass. There is no abdominal tenderness.   No right CVA tenderness.  No left CVA tenderness. There is no rigidity and no guarding.   Musculoskeletal:         General: No tenderness or edema. Normal range of motion.      Right hand: Normal. Normal range of motion. Normal strength. Normal sensation. Normal capillary refill. Normal pulse.      Left hand: Normal. Normal range  of motion. Normal strength. Normal sensation. Normal capillary refill. Normal pulse.      Cervical back: Normal, full passive range of motion without pain, normal range of motion and neck supple. No edema, erythema, rigidity or bony tenderness. No spinous process tenderness or muscular tenderness. Normal range of motion.      Thoracic back: Normal. No bony tenderness. Normal range of motion.      Lumbar back: Normal. No bony tenderness. Normal range of motion.      Right foot: Normal. Normal range of motion and normal capillary refill. No tenderness or bony tenderness. Normal pulse.      Left foot: Normal. Normal range of motion and normal capillary refill. No tenderness or bony tenderness. Normal pulse.      Comments: Pulses are 2+ throughout, cap refill is less than 2 sec throughout, extremities are nontender throughout with full range of motion. There is no spinal tenderness to palpation.     Neurological: She is alert and oriented to person, place, and time. She has normal strength. She displays normal reflexes. No cranial nerve deficit or sensory deficit. Gait normal.   No focal deficits.   Skin: Skin is warm, dry and intact. Capillary refill takes less than 2 seconds. No ecchymosis, no petechiae and no rash noted. No erythema. No pallor.   Psychiatric: She has a normal mood and affect. Her speech is normal and behavior is normal. Judgment and thought content normal. Cognition and memory are normal.       ED Course   Procedures  Labs Reviewed   MAGNESIUM - Abnormal; Notable for the following components:       Result Value    Magnesium 0.6 (*)     All other components within normal limits    Narrative:      k and mg critical result(s) repeated. Called and verbal readback   obtained from lucia anton rn er  by [facility] 03/18/2023 21:23   URINALYSIS, REFLEX TO URINE CULTURE - Abnormal; Notable for the following components:    Protein, UA Trace (*)     Ketones, UA 1+ (*)     Occult Blood UA Trace (*)     All other  components within normal limits    Narrative:     Specimen Source->Urine   MAGNESIUM - Abnormal; Notable for the following components:    Magnesium 0.6 (*)     All other components within normal limits    Narrative:      k and mg critical result(s) repeated. Called and verbal readback   obtained from lucia anton rn er  by [facility] 03/18/2023 21:23   CBC W/ AUTO DIFFERENTIAL - Abnormal; Notable for the following components:    Hematocrit 36.4 (*)     RDW 14.7 (*)     Gran # (ANC) 10.2 (*)     Immature Grans (Abs) 0.06 (*)     Lymph # 0.7 (*)     Mono # 1.2 (*)     Gran % 83.5 (*)     Lymph % 6.1 (*)     All other components within normal limits   COMPREHENSIVE METABOLIC PANEL - Abnormal; Notable for the following components:    Potassium 2.8 (*)     CO2 21 (*)     Glucose 159 (*)     Alkaline Phosphatase 42 (*)     Anion Gap 17 (*)     eGFR 50.2 (*)     All other components within normal limits    Narrative:      k and mg critical result(s) repeated. Called and verbal readback   obtained from lucia anton rn er  by [facility] 03/18/2023 21:23   B-TYPE NATRIURETIC PEPTIDE - Abnormal; Notable for the following components:     (*)     All other components within normal limits   LACTIC ACID, PLASMA - Abnormal; Notable for the following components:    Lactate (Lactic Acid) 2.1 (*)     All other components within normal limits    Narrative:      lactic acid critical result(s) repeated. Called and verbal readback   obtained from lucia anton  er  by [facility] 03/18/2023 21:22   APTT - Abnormal; Notable for the following components:    aPTT 91.0 (*)     All other components within normal limits   PROCALCITONIN - Abnormal; Notable for the following components:    Procalcitonin 1.42 (*)     All other components within normal limits   CULTURE, BLOOD   CULTURE, BLOOD   MRSA SCREEN BY PCR   CULTURE, RESPIRATORY   CLOSTRIDIUM DIFFICILE   CULTURE, STOOL   LIPASE   CK   TSH   TROPONIN I HIGH SENSITIVITY   PROTIME-INR   INFLUENZA A AND B  ANTIGEN    Narrative:     Specimen Source->Nasopharyngeal Swab   SARS-COV-2 RNA AMPLIFICATION, QUAL   PROCALCITONIN   TROPONIN I HIGH SENSITIVITY   LACTIC ACID, PLASMA   LEGIONELLA ANTIGEN, URINE RANDOM   COMPREHENSIVE METABOLIC PANEL   CBC W/ AUTO DIFFERENTIAL   GASTROINTESTINAL PATHOGENS PANEL, PCR   TYPE & SCREEN          Imaging Results              CT Abdomen Pelvis With Contrast (Final result)  Result time 03/19/23 00:26:43      Final result by Agustin Fontana MD (03/19/23 00:26:43)                   Narrative:    EXAM DESCRIPTION: CT ABDOMEN PELVIS WITH CONTRAST 3/19/2023 12:21 AM CDT    CLINICAL HISTORY: 83 years, Female, Epigastric pain    COMPARISON: None    PROCEDURE:    Contrast-enhanced images of the abdomen and pelvis were performed utilizing 2 mm slice thickness at 2 mm interval reconstruction from the lung bases to the ischial tuberosities after the administration of IV contrast.  In addition multiplanar reformats in the coronal and sagittal plane were obtained and reviewed.  This exam was performed according to our departmental dose-optimization protocol, which includes automated exposure control, adjustment of the mA and/or kV according to patient size and/or use of iterative reconstruction technique.    FINDINGS:    Evaluation of the chest will give superior report    The liver demonstrated presence of subcapsular right hepatic lobe cyst measuring 4.1 x 2.2 cm on image 76. There is a status post cholecystomy. Radiodensity within the peritoneum, anterior to the pancreatic head could correspond perhaps to dystrophic rim calcification/or large calculus from previous cholecystectomy.  The pancreas, spleen and adrenal glands demonstrate to be unremarkable, no focal lesions are noted.  The kidneys demonstrate normal uptake of contrast media. No evidence for nephrolithiasis and/or hydronephrosis. Small bilateral simple renal cysts too small to characterize by CT criteria. No follow-up is  recommended.  Grossly the unopacified stomach, small bowel and large bowel demonstrate to be within normal limits.  There is no evidence for bowel dilatation/or free air.  The appendix is normal. The left site colon is decompressed. There is diverticulosis within the sigmoid colon.  The urinary bladder demonstrate to be unremarkable.  The uterus is absent. There are no adnexal masses.  The aorta demonstrate atherosclerotic disease extending into the aortic location.  There is no retroperitoneal lymphadenopathy. There is no evidence for ascites/or significant abnormal fluid collections..  The bone windows demonstrate diffuse bony osteopenia. Degenerative disc disease at L1-L3. ORIF right hip joint.    IMPRESSION:  No acute intra-abdominal process.    Status post cholecystectomy and hysterectomy.    Hepatic cyst.    Atherosclerotic disease of the aorta.    Sigmoid diverticulosis without evidence of acute diverticulitis.    Degenerative disc disease at L1-L3.    Electronically signed by:  Agustin Fontana MD  3/19/2023 12:26 AM CDT Workstation: [id number]                                     CTA Chest Non-Coronary (PE Studies) (Final result)  Result time 03/19/23 00:21:30      Final result by Agustin Fontana MD (03/19/23 00:21:30)                   Narrative:    EXAM DESCRIPTION: CTA CHEST NON CORONARY (PE STUDIES) 3/19/2023 12:15 AM CDT    CLINICAL HISTORY: 83 years, Female, Pulmonary embolism (PE) suspected, high prob    COMPARISON: None    PROCEDURE:  Multiple transaxial tomograms of the chest were obtained from the lung apices through the lung bases utilizing 1 mm slice thickness at 1 mm interval reconstruction after the administration of 100 cc of Omnipaque 350 for complete opacification of the pulmonary arteries.  Subsequent 3-D maximum intensity projection images were generated in the coronal and sagittal plane for review.  This exam was performed according to our departmental dose-optimization protocol, which  includes automated exposure control, adjustment of the mA and/or kV according to patient size and/or use of iterative reconstruction technique.    FINDINGS:  The lungs parenchyma demonstrate the presence of centrilobular and paraseptal emphysematous changes. There is a focal area of airspace opacity/consolidation involving the posterior segment right lower lobe no significantly changed when compared to prior study. No significant masses, nodules and/or consolidations are identified.  The trachea mainstem bronchus demonstrate to be normal. There is bronchial some of the right lower lobe demonstrate to be patent. There is no significant pericardial or pleural effusions.  The thoracic aorta demonstrate intimal aortic arch ossification. No evidence for thoracic aortic aneurysm/or dissection. The heart is normal in size. There are coronary artery calcification. Minimal aortic valvular calcification. No evidence for right ventricular strain.  There are prominent right hilar lymph node measuring 1.1 cm on image 116, 1.1 cm on image 119, right infrahilar area measuring 1.1 cm on image 143 and subcarinal region measuring 1.2 cm on image 123. The axillary regions demonstrate to be clear.  Pulmonary arteries demonstrate to be normal, no intraluminal defect are seen that would suggest pulmonary embolus.  The bone windows demonstrate demonstrate minimal anterior spondylosis lower thoracic spine. Inferior deformity with minimal increase sclerosis at T11 perhaps suggesting old compression deformity.  There are bilateral breast implants with rim wall calcification.  Evaluation of the abdomen and pelvis will be given in separate report.    IMPRESSION:  No CT evidence for pulmonary embolism.    Focal area of airspace opacity/consolidation involving the posterior segment right lower lobe no significantly changed when compared to prior study.    Emphysematous changes.    Prominent right hilar and subcarinal lymph nodes, may be reactive  in nature.    Inferior deformity with minimal increase sclerosis at T11 perhaps suggesting old compression deformity.    Electronically signed by:  Agustin Fontana MD  3/19/2023 12:21 AM CDT Workstation: [id number]                                     X-Ray Chest AP Portable (In process)                      Medications   lactated ringers infusion (0 mL/hr Intravenous Hold 3/18/23 2130)   acetaminophen tablet 1,000 mg (1,000 mg Oral Not Given 3/18/23 2030)   vancomycin - pharmacy to dose (has no administration in time range)   sodium chloride 0.9% flush 10 mL (has no administration in time range)   naloxone 0.4 mg/mL injection 0.02 mg (has no administration in time range)   glucose chewable tablet 16 g (has no administration in time range)   glucose chewable tablet 24 g (has no administration in time range)   glucagon (human recombinant) injection 1 mg (has no administration in time range)   dextrose 10% bolus 125 mL 125 mL (has no administration in time range)   dextrose 10% bolus 250 mL 250 mL (has no administration in time range)   enoxaparin injection 40 mg (has no administration in time range)   potassium bicarbonate disintegrating tablet 50 mEq (has no administration in time range)   potassium bicarbonate disintegrating tablet 35 mEq (has no administration in time range)   potassium bicarbonate disintegrating tablet 60 mEq (has no administration in time range)   magnesium sulfate 2g in water 50mL IVPB (premix) (2 g Intravenous New Bag 3/19/23 0125)   potassium chloride SA CR tablet 40 mEq (40 mEq Oral Given 3/19/23 0121)   cefTRIAXone (ROCEPHIN) 1 g/50 mL D5W IVPB (has no administration in time range)   azithromycin 500 mg in dextrose 5 % 250 mL IVPB (ready to mix system) (has no administration in time range)   guaiFENesin 12 hr tablet 1,200 mg (has no administration in time range)   ondansetron injection 4 mg (has no administration in time range)   loperamide capsule 2 mg (has no administration in time range)  "  ondansetron injection 4 mg (4 mg Intravenous Given 3/18/23 2036)   lactated ringers bolus 1,770 mL (0 mLs Intravenous Stopped 3/19/23 0109)   cefepime in dextrose 5 % 1 gram/50 mL IVPB 1 g (0 g Intravenous Stopped 3/18/23 2326)   magnesium sulfate 2g in water 50mL IVPB (premix) (0 g Intravenous Stopped 3/18/23 2358)   potassium chloride 10 mEq in 100 mL IVPB (0 mEq Intravenous Stopped 3/18/23 2300)   vancomycin in dextrose 5 % 1 gram/250 mL IVPB 1,000 mg (0 mg Intravenous Stopped 3/19/23 0043)   iohexoL (OMNIPAQUE 350) injection 100 mL (100 mLs Intravenous Given 3/18/23 2325)   potassium chloride 10 mEq in 100 mL IVPB (10 mEq Intravenous New Bag 3/19/23 0125)     Medical Decision Making:   Clinical Tests:   Lab Tests: Ordered and Reviewed  Radiological Study: Ordered and Reviewed  Medical Tests: Ordered and Reviewed  Sepsis Perfusion Assessment: "I attest a sepsis perfusion exam was performed within 6 hours of sepsis, severe sepsis, or septic shock presentation, following fluid resuscitation."  ED Management:  Heart rate has improved with IV fluids as well as decrease in initial oral temperature of 99.9°.  The patient is not exhibiting evidence of shock or  Hypoperfusion.  Lab findings demonstrate hypo magnesemia as well as hypokalemia.  IV replacement initiated.  Lung exam is with rales the right lower lobe, reports having recurrent right lower lobe pneumonia over the past several months.  CT scan with evidence of persistent right lower lobe abnormality with associated mediastinal lymphadenopathy.  Blood cultures have been obtained, IV fluids have been given, patient has shown symptomatic improvement.  Vomiting is improved.  I have discussed the case with the hospitalist provider who has assumed care and will admit.                        Clinical Impression:   Final diagnoses:  [R11.10] Vomiting  [J18.9] Pneumonia of right lung due to infectious organism, unspecified part of lung (Primary)  [R09.02, Z99.81] " Hypoxemia requiring supplemental oxygen        ED Disposition Condition    Observation                 Ema Newman MD  03/19/23 0153       Ema Newman MD  03/19/23 0154

## 2023-03-19 NOTE — PT/OT/SLP EVAL
Speech Language Pathology Evaluation  Bedside Swallow    Patient Name:  Alexa Otero   MRN:  [id number]  Admitting Diagnosis: Pneumonia    Recommendations:                 General Recommendations:  Dysphagia therapy  Diet recommendations:  Regular, Thin   Aspiration Precautions: Alternating bites/sips, Feed only when awake/alert, Frequent oral care, HOB to 90 degrees, and Meds whole 1 at a time   General Precautions: Standard,    Communication strategies:  none    History:     Past Medical History:   Diagnosis Date    Arthritis     Cataract     Colon polyp     Diabetes mellitus type II 2012    Encounter for blood transfusion     Extra-ovarian endometrioid adenocarcinoma 2020    GERD (gastroesophageal reflux disease)     Hyperlipidemia     Hypertension     Macular degeneration     PONV (postoperative nausea and vomiting)     Squamous cell carcinoma 1980's    precancer of cervix       Past Surgical History:   Procedure Laterality Date    AUGMENTATION OF BREAST      CHOLECYSTECTOMY      COLONOSCOPY      ESOPHAGOGASTRODUODENOSCOPY N/A 06/20/2018    Procedure: EGD (ESOPHAGOGASTRODUODENOSCOPY);  Surgeon: Sabino Stephenson MD;  Location: [facility];  Service: Endoscopy;  Laterality: N/A;    HYSTERECTOMY      partial    INJECTION, SACROILIAC JOINT Right 1/26/2023    Procedure: INJECTION,SACROILIAC JOINT;  Surgeon: Agustin Robles MD;  Location: [facility] OR;  Service: Pain Management;  Laterality: Right;    INSERTION OF TUNNELED CENTRAL VENOUS CATHETER (CVC) WITH SUBCUTANEOUS PORT Right 10/19/2020    Procedure: INSERTION, PORT-A-CATH;  Surgeon: Misti Mcfarland MD;  Location: [facility] OR;  Service: General;  Laterality: Right;    INTRAMEDULLARY RODDING OF TROCHANTER OF FEMUR Right 11/28/2021    Procedure: INSERTION, INTRAMEDULLARY LUC, FEMUR, TROCHANTER/RIGHT TFN DR FAJARDO NOTIFIED REP;  Surgeon: Kp Fajardo MD;  Location: [facility] OR;  Service: Orthopedics;  Laterality: Right;  SKIP    ROBOT-ASSISTED LAPAROSCOPIC LYMPHADENECTOMY  USING DA NELLA XI N/A 09/21/2020    Procedure: XI ROBOTIC LYMPHADENECTOMY-pelvic and kell-aortic;  Surgeon: Altagracia Ray MD;  Location: [facility] OR;  Service: OB/GYN;  Laterality: N/A;    ROBOT-ASSISTED LAPAROSCOPIC OMENTECTOMY USING DA NELLA XI N/A 09/21/2020    Procedure: XI ROBOTIC OMENTECTOMY;  Surgeon: Altagracia Ray MD;  Location: [facility] OR;  Service: OB/GYN;  Laterality: N/A;    ROBOT-ASSISTED LAPAROSCOPIC SALPINGO-OOPHORECTOMY USING DA NELLA XI Bilateral 09/21/2020    Procedure: XI ROBOTIC SALPINGO-OOPHORECTOMY;  Surgeon: Altagracia Ray MD;  Location: [facility] OR;  Service: OB/GYN;  Laterality: Bilateral;    UPPER GASTROINTESTINAL ENDOSCOPY         Chest X-Rays: CXR on 3/18/2023     COMPARISON:March 3, 2023     FINDINGS:Right subclavian infusion catheter is noted with tip projecting over the entry point the superior vena cava. Calcified breast implants again projecting over the lower chest. Lungs are well expanded and free of active disease. Mild bibasilar atelectasis/infiltrate more prominent towards the right with improvement.. Pulmonary vascularity is without evidence of engorgement. The thoracic spine mild convexity towards the right.    Prior diet: Unrestricted diet per pt    Subjective   Awake, alert, indep oriented x4. Pt reported cough lasting for longer than a month, w/o correlation to eating and/or drinking. She also reported frequent expectoration of discolored sputum. However, she reported a significant reduction today in the amount of coughing she usually experiences.    Pain/Comfort:  Pain Rating 1: 0/10    Respiratory Status: Room air    Objective:     Oral Musculature Evaluation  Oral Musculature: WFL  Dentition: present and adequate  Secretion Management: adequate  Mucosal Quality: adequate  Mandibular Strength and Mobility: WFL  Oral Labial Strength and Mobility: WFL  Lingual Strength and Mobility: WFL  Velar Elevation: WFL  Buccal Strength and Mobility: WFL  Volitional Cough: WFL;  productive  Volitional Swallow: WFL  Voice Prior to PO Intake: Clear    Bedside Swallow Eval:   Consistencies Assessed:  Thin liquids - 3oz via cup edge  Puree - x3tsp  Soft solids -x2      Oral Phase:   WFL    Pharyngeal Phase:   no overt clinical signs/symptoms of aspiration  no overt clinical signs/symptoms of pharyngeal dysphagia    Compensatory Strategies  None    Treatment: B/s swallow evaluation completed. Across trials, pt demo intact mastication/swallowing function which may be considered adequate for safe consumption of PO intake.     Assessment:     Alexa Otero is a 83 y.o. female with a medical dx of  Pneumonia. She presents with adequate swallowing function for a least restrictive regular consistency diet and thin liquids. ST will follow x2 to assess pt's diet tolerance.     Goals:   Multidisciplinary Problems       SLP Goals          Problem: SLP    Goal Priority Disciplines Outcome   SLP Goal     SLP Ongoing, Progressing   Description: 1. Pt will tolerate a IDDSI level 7 diet and thin liquids w/o overt s/s of aspiration.                        Plan:     Patient to be seen:  2 x/week   Plan of Care expires:     Plan of Care reviewed with:  patient   SLP Follow-Up:  Yes       Discharge recommendations:      Barriers to Discharge:  None    Time Tracking:     SLP Treatment Date:   03/19/23  Speech Start Time:  1223  Speech Stop Time:  1234     Speech Total Time (min):  11 min    Billable Minutes: Eval Swallow and Oral Function 11min    03/19/2023

## 2023-03-20 ENCOUNTER — TELEPHONE (OUTPATIENT)
Dept: FAMILY MEDICINE | Facility: CLINIC | Age: 83
End: 2023-03-20
Payer: MEDICARE

## 2023-03-20 LAB
ALBUMIN SERPL BCP-MCNC: 2.7 G/DL (ref 3.5–5.2)
ALP SERPL-CCNC: 39 U/L (ref 55–135)
ALT SERPL W/O P-5'-P-CCNC: 11 U/L (ref 10–44)
ANION GAP SERPL CALC-SCNC: 9 MMOL/L (ref 8–16)
AST SERPL-CCNC: 14 U/L (ref 10–40)
BASOPHILS # BLD AUTO: 0.02 K/UL (ref 0–0.2)
BASOPHILS NFR BLD: 0.3 % (ref 0–1.9)
BILIRUB SERPL-MCNC: 0.6 MG/DL (ref 0.1–1)
BUN SERPL-MCNC: 16 MG/DL (ref 8–23)
BURR CELLS BLD QL SMEAR: ABNORMAL
CALCIUM SERPL-MCNC: 8.6 MG/DL (ref 8.7–10.5)
CHLORIDE SERPL-SCNC: 104 MMOL/L (ref 95–110)
CO2 SERPL-SCNC: 26 MMOL/L (ref 23–29)
CREAT SERPL-MCNC: 1 MG/DL (ref 0.5–1.4)
DIFFERENTIAL METHOD: ABNORMAL
EOSINOPHIL # BLD AUTO: 0.1 K/UL (ref 0–0.5)
EOSINOPHIL NFR BLD: 1.3 % (ref 0–8)
ERYTHROCYTE [DISTWIDTH] IN BLOOD BY AUTOMATED COUNT: 15.1 % (ref 11.5–14.5)
EST. GFR  (NO RACE VARIABLE): 56.2 ML/MIN/1.73 M^2
GLUCOSE SERPL-MCNC: 107 MG/DL (ref 70–110)
GLUCOSE SERPL-MCNC: 88 MG/DL (ref 70–110)
HCT VFR BLD AUTO: 29.1 % (ref 37–48.5)
HGB BLD-MCNC: 9.2 G/DL (ref 12–16)
IMM GRANULOCYTES # BLD AUTO: 0.02 K/UL (ref 0–0.04)
IMM GRANULOCYTES NFR BLD AUTO: 0.3 % (ref 0–0.5)
LYMPHOCYTES # BLD AUTO: 1 K/UL (ref 1–4.8)
LYMPHOCYTES NFR BLD: 14.3 % (ref 18–48)
MAGNESIUM SERPL-MCNC: 1.9 MG/DL (ref 1.6–2.6)
MCH RBC QN AUTO: 30.2 PG (ref 27–31)
MCHC RBC AUTO-ENTMCNC: 31.6 G/DL (ref 32–36)
MCV RBC AUTO: 95 FL (ref 82–98)
MONOCYTES # BLD AUTO: 0.7 K/UL (ref 0.3–1)
MONOCYTES NFR BLD: 10.6 % (ref 4–15)
NEUTROPHILS # BLD AUTO: 5 K/UL (ref 1.8–7.7)
NEUTROPHILS NFR BLD: 73.2 % (ref 38–73)
NRBC BLD-RTO: 0 /100 WBC
OVALOCYTES BLD QL SMEAR: ABNORMAL
PLATELET # BLD AUTO: 174 K/UL (ref 150–450)
PLATELET BLD QL SMEAR: ABNORMAL
PMV BLD AUTO: 10.2 FL (ref 9.2–12.9)
POTASSIUM SERPL-SCNC: 4.4 MMOL/L (ref 3.5–5.1)
PROT SERPL-MCNC: 5.2 G/DL (ref 6–8.4)
RBC # BLD AUTO: 3.05 M/UL (ref 4–5.4)
SODIUM SERPL-SCNC: 139 MMOL/L (ref 136–145)
WBC # BLD AUTO: 6.77 K/UL (ref 3.9–12.7)

## 2023-03-20 PROCEDURE — 83735 ASSAY OF MAGNESIUM: CPT | Performed by: INTERNAL MEDICINE

## 2023-03-20 PROCEDURE — 36415 COLL VENOUS BLD VENIPUNCTURE: CPT | Performed by: INTERNAL MEDICINE

## 2023-03-20 PROCEDURE — 87449 NOS EACH ORGANISM AG IA: CPT | Performed by: STUDENT IN AN ORGANIZED HEALTH CARE EDUCATION/TRAINING PROGRAM

## 2023-03-20 PROCEDURE — 85025 COMPLETE CBC W/AUTO DIFF WBC: CPT | Performed by: INTERNAL MEDICINE

## 2023-03-20 PROCEDURE — 94761 N-INVAS EAR/PLS OXIMETRY MLT: CPT

## 2023-03-20 PROCEDURE — 27000221 HC OXYGEN, UP TO 24 HOURS

## 2023-03-20 PROCEDURE — 25000003 PHARM REV CODE 250: Performed by: STUDENT IN AN ORGANIZED HEALTH CARE EDUCATION/TRAINING PROGRAM

## 2023-03-20 PROCEDURE — 25000003 PHARM REV CODE 250: Performed by: HOSPITALIST

## 2023-03-20 PROCEDURE — 96366 THER/PROPH/DIAG IV INF ADDON: CPT

## 2023-03-20 PROCEDURE — G0378 HOSPITAL OBSERVATION PER HR: HCPCS

## 2023-03-20 PROCEDURE — 99900035 HC TECH TIME PER 15 MIN (STAT)

## 2023-03-20 PROCEDURE — 87046 STOOL CULTR AEROBIC BACT EA: CPT | Performed by: INTERNAL MEDICINE

## 2023-03-20 PROCEDURE — 25000003 PHARM REV CODE 250: Performed by: INTERNAL MEDICINE

## 2023-03-20 PROCEDURE — 92526 ORAL FUNCTION THERAPY: CPT

## 2023-03-20 PROCEDURE — 96372 THER/PROPH/DIAG INJ SC/IM: CPT | Performed by: INTERNAL MEDICINE

## 2023-03-20 PROCEDURE — 94799 UNLISTED PULMONARY SVC/PX: CPT

## 2023-03-20 PROCEDURE — 87070 CULTURE OTHR SPECIMN AEROBIC: CPT | Mod: 59 | Performed by: STUDENT IN AN ORGANIZED HEALTH CARE EDUCATION/TRAINING PROGRAM

## 2023-03-20 PROCEDURE — 63600175 PHARM REV CODE 636 W HCPCS: Performed by: INTERNAL MEDICINE

## 2023-03-20 PROCEDURE — 25000003 PHARM REV CODE 250

## 2023-03-20 PROCEDURE — 80053 COMPREHEN METABOLIC PANEL: CPT | Performed by: INTERNAL MEDICINE

## 2023-03-20 PROCEDURE — 87205 SMEAR GRAM STAIN: CPT | Performed by: STUDENT IN AN ORGANIZED HEALTH CARE EDUCATION/TRAINING PROGRAM

## 2023-03-20 PROCEDURE — 87045 FECES CULTURE AEROBIC BACT: CPT | Performed by: INTERNAL MEDICINE

## 2023-03-20 RX ORDER — HYDROXYZINE HYDROCHLORIDE 25 MG/1
25 TABLET, FILM COATED ORAL NIGHTLY PRN
Status: DISCONTINUED | OUTPATIENT
Start: 2023-03-20 | End: 2023-03-21 | Stop reason: HOSPADM

## 2023-03-20 RX ORDER — TALC
6 POWDER (GRAM) TOPICAL NIGHTLY PRN
Status: DISCONTINUED | OUTPATIENT
Start: 2023-03-20 | End: 2023-03-21 | Stop reason: HOSPADM

## 2023-03-20 RX ORDER — GABAPENTIN 100 MG/1
100 CAPSULE ORAL 2 TIMES DAILY
Status: DISCONTINUED | OUTPATIENT
Start: 2023-03-20 | End: 2023-03-21 | Stop reason: HOSPADM

## 2023-03-20 RX ADMIN — ACETAMINOPHEN 650 MG: 325 TABLET ORAL at 07:03

## 2023-03-20 RX ADMIN — GABAPENTIN 100 MG: 100 CAPSULE ORAL at 11:03

## 2023-03-20 RX ADMIN — GABAPENTIN 100 MG: 100 CAPSULE ORAL at 08:03

## 2023-03-20 RX ADMIN — CHLORHEXIDINE GLUCONATE 15 ML: 1.2 RINSE ORAL at 08:03

## 2023-03-20 RX ADMIN — MUPIROCIN 1 G: 20 OINTMENT TOPICAL at 08:03

## 2023-03-20 RX ADMIN — AZITHROMYCIN MONOHYDRATE 500 MG: 500 INJECTION, POWDER, LYOPHILIZED, FOR SOLUTION INTRAVENOUS at 09:03

## 2023-03-20 RX ADMIN — ACETAMINOPHEN 650 MG: 325 TABLET ORAL at 09:03

## 2023-03-20 RX ADMIN — SODIUM CHLORIDE, SODIUM LACTATE, POTASSIUM CHLORIDE, AND CALCIUM CHLORIDE: .6; .31; .03; .02 INJECTION, SOLUTION INTRAVENOUS at 08:03

## 2023-03-20 RX ADMIN — GUAIFENESIN 1200 MG: 600 TABLET, EXTENDED RELEASE ORAL at 09:03

## 2023-03-20 RX ADMIN — Medication 6 MG: at 08:03

## 2023-03-20 RX ADMIN — HYDROXYZINE HYDROCHLORIDE 25 MG: 25 TABLET ORAL at 08:03

## 2023-03-20 RX ADMIN — GUAIFENESIN 1200 MG: 600 TABLET, EXTENDED RELEASE ORAL at 08:03

## 2023-03-20 RX ADMIN — ENOXAPARIN SODIUM 40 MG: 100 INJECTION SUBCUTANEOUS at 05:03

## 2023-03-20 RX ADMIN — MUPIROCIN 1 G: 20 OINTMENT TOPICAL at 09:03

## 2023-03-20 RX ADMIN — Medication 6 MG: at 12:03

## 2023-03-20 RX ADMIN — CEFTRIAXONE 1 G: 1 INJECTION, SOLUTION INTRAVENOUS at 06:03

## 2023-03-20 RX ADMIN — CHLORHEXIDINE GLUCONATE 15 ML: 1.2 RINSE ORAL at 09:03

## 2023-03-20 NOTE — PLAN OF CARE
03/19/23 2040   Patient Assessment/Suction   Level of Consciousness (AVPU) alert   Respiratory Effort Unlabored   Expansion/Accessory Muscles/Retractions expansion symmetric   All Lung Fields Breath Sounds coarse   Rhythm/Pattern, Respiratory depth regular;pattern regular   Cough Frequency infrequent   Cough Type good;nonproductive   PRE-TX-O2   Device (Oxygen Therapy) room air   SpO2 98 %   Pulse Oximetry Type Continuous   $ Pulse Oximetry - Multiple Charge Pulse Oximetry - Multiple   Pulse 104   Resp (!) 22   Vibratory PEP Therapy   $ Vibratory PEP Charges Aerobika Therapy   Daily Review of Necessity (PEP Therapy) completed   Type (PEP Therapy) vibratory/oscillatory   Device (PEP Therapy) flutter   Route (PEP Therapy) mouthpiece   Breaths per Cycle (PEP Therapy) 10   Cycles (PEP Therapy) 1   Signs of Intolerance (PEP Therapy) none   Education   $ Education DME Oxygen;15 min

## 2023-03-20 NOTE — NURSING
Patient c/o not sleeping last night. She was having aching pains in her RLQ & LLQ which began giving her anxiety. Melatonin has not been beneficial.    Atarax 25 mg PO PRN at bedtime now ordered.

## 2023-03-20 NOTE — PT/OT/SLP PROGRESS
Speech Language Pathology Treatment    Patient Name:  Alexa Otero   MRN:  [id number]  Admitting Diagnosis: Pneumonia    Recommendations:                 General Recommendations:  Follow-up not indicated  Diet recommendations:  Regular, Liquid Diet Level: Thin   Aspiration Precautions: Standard aspiration precautions   General Precautions: Standard,    Communication strategies:  none    Subjective     Pt awake sitting edge of bed w/ lunch tray. Agreeable to ST.  Patient goals: none stated     Pain/Comfort:       Respiratory Status: Room air    Objective:     Has the patient been evaluated by SLP for swallowing?   Yes  Keep patient NPO? No   Current Respiratory Status:        Pt observed during consumption of lunch tray. Adequate oral clearance, mastication, and no overt s/s aspiration noted across trials. Pt w/ adequate laryngeal elevation/excursion noted via palpation across trials of regular textures and thin liquids.    Assessment:     Alexa Otero is a 83 y.o. female with an SLP diagnosis of  pneumonia .  She presents with adequate oral phases of the swallow and perceived pharyngeal phase of the swallow. No overt s/s aspiration noted across trials. No further ST warranted at this time. Pt tolerating LRD w/o complications.    Goals:   Multidisciplinary Problems       SLP Goals          Problem: SLP    Goal Priority Disciplines Outcome   SLP Goal     SLP Ongoing, Progressing   Description: 1. Pt will tolerate a IDDSI level 7 diet and thin liquids w/o overt s/s of aspiration.                        Plan:     Patient to be seen:  2 x/week   Plan of Care expires:     Plan of Care reviewed with:  patient, other (see comments) (nursing)   SLP Follow-Up:  No       Discharge recommendations:      Barriers to Discharge:  None    Time Tracking:     SLP Treatment Date:   03/20/23  Speech Start Time:  1226  Speech Stop Time:  1241     Speech Total Time (min):  15 min    Billable Minutes: Treatment Swallowing Dysfunction 15  min    03/20/2023

## 2023-03-20 NOTE — NURSING
Stool sent off for culturing to rule out Cdiff. Stool was loose and brown.    Sputum sample sent off for culturing. Pt said she had blood-tinged sputum but flushed it when using the toilet. Sample small but seemed to have more of a pink tinge

## 2023-03-20 NOTE — TELEPHONE ENCOUNTER
Called patient's  in regards to some questions they have. No answer , left voicemail to return call.

## 2023-03-20 NOTE — PROGRESS NOTES
[facility] Medicine  Progress Note    Patient name: Alexa Otero  MRN: [id number]  Admit Date: 3/18/2023   LOS: 0 days     SUBJECTIVE:     Principal problem: Pneumonia, vomiting, diarrhea, electrolyte derangements      Interval History:      3/20- says diarrhea has slowed, denies ever having shortness of breath or chest pain with the recurrent pna.  Afebrile and vitals stable.      Scheduled Meds:   azithromycin  500 mg Intravenous Q24H    cefTRIAXone (ROCEPHIN) IVPB  1 g Intravenous Q24H    chlorhexidine  15 mL Mouth/Throat BID    enoxaparin  40 mg Subcutaneous Daily    gabapentin  100 mg Oral BID    guaiFENesin  1,200 mg Oral BID    mupirocin   Nasal BID     Continuous Infusions:   lactated ringers 125 mL/hr at 03/20/23 1703     PRN Meds:acetaminophen, dextrose 10%, dextrose 10%, glucagon (human recombinant), glucose, glucose, hydrOXYzine HCL, loperamide, magnesium sulfate IVPB, magnesium sulfate IVPB, melatonin, naloxone, ondansetron, potassium bicarbonate, potassium bicarbonate, potassium bicarbonate, sodium chloride 0.9%    Review of patient's allergies indicates:  No Known Allergies    Review of Systems: As per interval history    OBJECTIVE:     Vital Signs (Most Recent)  Temp: 97.9 °F (36.6 °C) (03/20/23 1502)  Pulse: 91 (03/20/23 1702)  Resp: (!) 27 (03/20/23 1702)  BP: 128/69 (03/20/23 1702)  SpO2: 99 % (03/20/23 1702)    Vital Signs Range (Last 24H):  Temp:  [97.9 °F (36.6 °C)-99.7 °F (37.6 °C)]   Pulse:  []   Resp:  [15-32]   BP: ()/(50-69)   SpO2:  [92 %-99 %]     I & O (Last 24H):  Intake/Output Summary (Last 24 hours) at 3/20/2023 1855  Last data filed at 3/20/2023 1703  Gross per 24 hour   Intake 5125 ml   Output 2500 ml   Net 2625 ml       Physical Exam:  General: Patient resting comfortably in no acute distress.   Eyes: +conjunctival injection. No scleral icterus.  ENT: Hearing grossly intact. No discharge from ears. No nasal discharge.   CVS: RRR. No LE edema  BL  Lungs:  No tachypnea or accessory muscle use.  RLL diminished with rales and rhonchi  Abdomen:  Soft, nontender and nondistended.  No organomegaly  Neuro: AOx3. Moves all extremities. Follows commands. Responds appropriately     Laboratory:  I have reviewed all pertinent lab results within the past 24 hours.    Diagnostic Results:  Labs: Reviewed  ECG: Reviewed  X-Ray: Reviewed  CT: Reviewed    ASSESSMENT/PLAN:     A  82 y/o female has a history of hypertension, GERD, hyperlipidemia, diabetes, recurrent pneumonia here with flu-like symptoms including diarrhea and vomiting, around 10-12 episodes.    Active Hospital Problems    Diagnosis  POA    *Pneumonia [J18.9]  Unknown      Resolved Hospital Problems   No resolved problems to display.         Plan:     RLL PNA (CT chest w/ consolidation and elevated PCT)  Hx chronic cough  -denies symptoms r/t pna  -Rocephin azithromycin   -check sp culture, urine legionella, flu, blood cultures  -SLP eval, modified barium swallow given hx Olu's   -OP f/u with pulm for pna was scheduled for 3/22, she cancelled appt when she got admitted  -consult pulmonology  -?obstructive  -Acapella, mucinex   -The CT chest in February 14th shows a similar right lower lobe infiltrate, will need to repeat CT scan as an outpatient to ensure resolution        Hypokalemia/Hypomagnesemia  Nausea, vomiting and diarrhe  -Replace/monitor  -Check cdiff PCR  -tele monitor    VTE Risk Mitigation (From admission, onward)           Ordered     enoxaparin injection 40 mg  Daily         03/19/23 0103     IP VTE HIGH RISK PATIENT  Once         03/19/23 0103     Place sequential compression device  Until discontinued         03/19/23 0103                        Department Hospital Medicine  [facility]  Harpreet Morales MD  Date of service: 03/20/2023

## 2023-03-20 NOTE — TELEPHONE ENCOUNTER
----- Message from Eben Villar sent at 3/20/2023 12:23 PM CDT -----  Regarding: Return Call  Contact: Porter Otero   Type:  Needs Medical Advice    Who Called:  Porter Otero  Symptoms (please be specific):  would like a call from office staff.   How long has patient had these symptoms:    Pharmacy name and phone #:    Would the patient rather a call back or a response via [facility]? call  Best Call Back Number: [phone]  Additional Information: Please call  Porter Otero.

## 2023-03-20 NOTE — TELEPHONE ENCOUNTER
----- Message from Lisbet Quiñonez sent at 3/20/2023 11:54 AM CDT -----  Name of Who is Calling: Porter        What is the request in detail: stated that the pt is currently is admitted to [facility] and he would like to let the staff know about whats going on with her     Can the clinic reply by [facility]:no        What Number to Call Back if not in [facility]:  [phone]

## 2023-03-20 NOTE — NURSING
C/O tingling in feet over night due to neuropathy. Restarted home medication - gabapention 100 mg BID. First dose given at lunch.

## 2023-03-21 VITALS
HEIGHT: 63 IN | SYSTOLIC BLOOD PRESSURE: 137 MMHG | HEART RATE: 90 BPM | TEMPERATURE: 99 F | RESPIRATION RATE: 20 BRPM | WEIGHT: 130 LBS | BODY MASS INDEX: 23.04 KG/M2 | OXYGEN SATURATION: 93 % | DIASTOLIC BLOOD PRESSURE: 63 MMHG

## 2023-03-21 DIAGNOSIS — J69.0 ASPIRATION PNEUMONIA, UNSPECIFIED ASPIRATION PNEUMONIA TYPE, UNSPECIFIED LATERALITY, UNSPECIFIED PART OF LUNG: Primary | ICD-10-CM

## 2023-03-21 LAB
ALBUMIN SERPL BCP-MCNC: 2.8 G/DL (ref 3.5–5.2)
ALP SERPL-CCNC: 39 U/L (ref 55–135)
ALT SERPL W/O P-5'-P-CCNC: 12 U/L (ref 10–44)
ANION GAP SERPL CALC-SCNC: 7 MMOL/L (ref 8–16)
AST SERPL-CCNC: 10 U/L (ref 10–40)
BACTERIA SPEC AEROBE CULT: NORMAL
BASOPHILS # BLD AUTO: 0.01 K/UL (ref 0–0.2)
BASOPHILS NFR BLD: 0.2 % (ref 0–1.9)
BILIRUB SERPL-MCNC: 0.7 MG/DL (ref 0.1–1)
BUN SERPL-MCNC: 13 MG/DL (ref 8–23)
C DIFF GDH STL QL: NEGATIVE
C DIFF TOX A+B STL QL IA: NEGATIVE
CALCIUM SERPL-MCNC: 8.6 MG/DL (ref 8.7–10.5)
CHLORIDE SERPL-SCNC: 104 MMOL/L (ref 95–110)
CO2 SERPL-SCNC: 28 MMOL/L (ref 23–29)
CREAT SERPL-MCNC: 0.9 MG/DL (ref 0.5–1.4)
DIFFERENTIAL METHOD: ABNORMAL
EOSINOPHIL # BLD AUTO: 0.1 K/UL (ref 0–0.5)
EOSINOPHIL NFR BLD: 2.3 % (ref 0–8)
ERYTHROCYTE [DISTWIDTH] IN BLOOD BY AUTOMATED COUNT: 14.9 % (ref 11.5–14.5)
EST. GFR  (NO RACE VARIABLE): >60 ML/MIN/1.73 M^2
GLUCOSE SERPL-MCNC: 95 MG/DL (ref 70–110)
GRAM STN SPEC: NORMAL
HCT VFR BLD AUTO: 29.1 % (ref 37–48.5)
HGB BLD-MCNC: 9.2 G/DL (ref 12–16)
IMM GRANULOCYTES # BLD AUTO: 0.03 K/UL (ref 0–0.04)
IMM GRANULOCYTES NFR BLD AUTO: 0.5 % (ref 0–0.5)
L PNEUMO1 AG UR QL IA: NEGATIVE
LEGIONELLA SPECIMEN SOURCE: NORMAL
LYMPHOCYTES # BLD AUTO: 1 K/UL (ref 1–4.8)
LYMPHOCYTES NFR BLD: 16.6 % (ref 18–48)
MAGNESIUM SERPL-MCNC: 1.6 MG/DL (ref 1.6–2.6)
MCH RBC QN AUTO: 29.7 PG (ref 27–31)
MCHC RBC AUTO-ENTMCNC: 31.6 G/DL (ref 32–36)
MCV RBC AUTO: 94 FL (ref 82–98)
MONOCYTES # BLD AUTO: 0.6 K/UL (ref 0.3–1)
MONOCYTES NFR BLD: 11.2 % (ref 4–15)
NEUTROPHILS # BLD AUTO: 4 K/UL (ref 1.8–7.7)
NEUTROPHILS NFR BLD: 69.2 % (ref 38–73)
NRBC BLD-RTO: 0 /100 WBC
PLATELET # BLD AUTO: 216 K/UL (ref 150–450)
PMV BLD AUTO: 9.7 FL (ref 9.2–12.9)
POTASSIUM SERPL-SCNC: 4 MMOL/L (ref 3.5–5.1)
PROT SERPL-MCNC: 5.6 G/DL (ref 6–8.4)
RBC # BLD AUTO: 3.1 M/UL (ref 4–5.4)
SODIUM SERPL-SCNC: 139 MMOL/L (ref 136–145)
WBC # BLD AUTO: 5.73 K/UL (ref 3.9–12.7)

## 2023-03-21 PROCEDURE — 96366 THER/PROPH/DIAG IV INF ADDON: CPT

## 2023-03-21 PROCEDURE — 99204 PR OFFICE/OUTPT VISIT, NEW, LEVL IV, 45-59 MIN: ICD-10-PCS | Mod: ,,, | Performed by: INTERNAL MEDICINE

## 2023-03-21 PROCEDURE — 25000003 PHARM REV CODE 250: Performed by: INTERNAL MEDICINE

## 2023-03-21 PROCEDURE — 94664 DEMO&/EVAL PT USE INHALER: CPT

## 2023-03-21 PROCEDURE — 63600175 PHARM REV CODE 636 W HCPCS: Performed by: INTERNAL MEDICINE

## 2023-03-21 PROCEDURE — 25000003 PHARM REV CODE 250

## 2023-03-21 PROCEDURE — 85025 COMPLETE CBC W/AUTO DIFF WBC: CPT | Performed by: INTERNAL MEDICINE

## 2023-03-21 PROCEDURE — 99204 OFFICE O/P NEW MOD 45 MIN: CPT | Mod: ,,, | Performed by: INTERNAL MEDICINE

## 2023-03-21 PROCEDURE — 99900035 HC TECH TIME PER 15 MIN (STAT)

## 2023-03-21 PROCEDURE — G0378 HOSPITAL OBSERVATION PER HR: HCPCS | Mod: CS

## 2023-03-21 PROCEDURE — 36415 COLL VENOUS BLD VENIPUNCTURE: CPT | Performed by: INTERNAL MEDICINE

## 2023-03-21 PROCEDURE — 88305 TISSUE EXAM BY PATHOLOGIST: CPT | Mod: TC

## 2023-03-21 PROCEDURE — 80053 COMPREHEN METABOLIC PANEL: CPT | Performed by: INTERNAL MEDICINE

## 2023-03-21 PROCEDURE — 25000003 PHARM REV CODE 250: Performed by: STUDENT IN AN ORGANIZED HEALTH CARE EDUCATION/TRAINING PROGRAM

## 2023-03-21 PROCEDURE — 83735 ASSAY OF MAGNESIUM: CPT | Performed by: INTERNAL MEDICINE

## 2023-03-21 RX ORDER — AMOXICILLIN AND CLAVULANATE POTASSIUM 875; 125 MG/1; MG/1
1 TABLET, FILM COATED ORAL 2 TIMES DAILY
Qty: 14 TABLET | Refills: 0 | Status: SHIPPED | OUTPATIENT
Start: 2023-03-21 | End: 2023-03-28

## 2023-03-21 RX ORDER — DOXYCYCLINE 100 MG/1
100 CAPSULE ORAL EVERY 12 HOURS
Qty: 14 CAPSULE | Refills: 0 | Status: SHIPPED | OUTPATIENT
Start: 2023-03-21 | End: 2023-03-28

## 2023-03-21 RX ADMIN — AZITHROMYCIN MONOHYDRATE 500 MG: 500 INJECTION, POWDER, LYOPHILIZED, FOR SOLUTION INTRAVENOUS at 08:03

## 2023-03-21 RX ADMIN — GABAPENTIN 100 MG: 100 CAPSULE ORAL at 09:03

## 2023-03-21 RX ADMIN — CHLORHEXIDINE GLUCONATE 15 ML: 1.2 RINSE ORAL at 09:03

## 2023-03-21 RX ADMIN — GUAIFENESIN 1200 MG: 600 TABLET, EXTENDED RELEASE ORAL at 09:03

## 2023-03-21 RX ADMIN — MUPIROCIN 1 G: 20 OINTMENT TOPICAL at 09:03

## 2023-03-21 RX ADMIN — CEFTRIAXONE 1 G: 1 INJECTION, SOLUTION INTRAVENOUS at 06:03

## 2023-03-21 NOTE — PLAN OF CARE
PCP f/U with ROSARIO Ca NP added to pts AVS for 3/29/23 at 1:30 pm. AVS updated.    03/21/23 1222   Discharge Assessment   Assessment Type Discharge Planning Reassessment

## 2023-03-21 NOTE — PLAN OF CARE
The pt is cleared for discharge home from case management and has follow up appointments added to her AVS.    03/21/23 1223   Final Note   Assessment Type Final Discharge Note   Anticipated Discharge Disposition Home   What phone number can be called within the next 1-3 days to see how you are doing after discharge? [phone]   Hospital Resources/Appts/Education Provided Appointments scheduled and added to AVS;Appointments scheduled by Navigator/Coordinator   Post-Acute Status   Discharge Delays None known at this time

## 2023-03-21 NOTE — HOSPITAL COURSE
Patient is an 83-year-old female with a history of pneumonia.  She has had a cough for the last couple of months now and right lower lobe infiltrate.  She was admitted for further workup and IV antibiotics.  However, she is on room air and has normal WBC count.  She is afebrile and otherwise does not feel significantly short of breath.  Given the findings on imaging I will continue doxycycline for antibiotic treatment of presumed pneumonia for the next 7 days.  I have placed an urgent referral to Pulmonary for outpatient follow-up.  She will need repeat CT imaging to ensure resolution of the area in question.  If this has not resolved she will need likely bronchoscopy versus biopsy with Pulmonary as an outpatient.  I I discussed this treatment plan with the patient and she is in agreement.  We reviewed the discharge plan of care instructions on the day of discharge.  She was discharged in good condition with plans for close outpatient follow-up.

## 2023-03-21 NOTE — CARE UPDATE
03/20/23 7832   Patient Assessment/Suction   Level of Consciousness (AVPU) alert   Respiratory Effort Unlabored   Expansion/Accessory Muscles/Retractions no use of accessory muscles   All Lung Fields Breath Sounds clear;diminished   Rhythm/Pattern, Respiratory no shortness of breath reported   Cough Frequency no cough   PRE-TX-O2   Device (Oxygen Therapy) room air   SpO2 (!) 94 %   Pulse Oximetry Type Continuous   $ Pulse Oximetry - Multiple Charge Pulse Oximetry - Multiple   Pulse 81   Resp 17   Positioning   Head of Bed (HOB) Positioning HOB elevated;HOB at 30 degrees   Respiratory Evaluation   $ Care Plan Tech Time 15 min   $ Eval/Re-eval Charges Re-evaluation

## 2023-03-21 NOTE — PLAN OF CARE
Problem: Infection  Goal: Absence of Infection Signs and Symptoms  Outcome: Ongoing, Progressing     Problem: Adult Inpatient Plan of Care  Goal: Plan of Care Review  Outcome: Ongoing, Progressing  Goal: Patient-Specific Goal (Individualized)  Outcome: Ongoing, Progressing  Goal: Absence of Hospital-Acquired Illness or Injury  Outcome: Ongoing, Progressing  Goal: Optimal Comfort and Wellbeing  Outcome: Ongoing, Progressing  Goal: Readiness for Transition of Care  Outcome: Ongoing, Progressing     Problem: Fluid Imbalance (Pneumonia)  Goal: Fluid Balance  Outcome: Ongoing, Progressing     Problem: Infection (Pneumonia)  Goal: Resolution of Infection Signs and Symptoms  Outcome: Ongoing, Progressing     Problem: Respiratory Compromise (Pneumonia)  Goal: Effective Oxygenation and Ventilation  Outcome: Ongoing, Progressing     Problem: Fall Injury Risk  Goal: Absence of Fall and Fall-Related Injury  Outcome: Ongoing, Progressing

## 2023-03-21 NOTE — PROGRESS NOTES
Called in Augmentin for aspiration pneumonia per Pulmonary recommendations.  Attempted to call the phone number provided however this is not the correct phone number.

## 2023-03-21 NOTE — DISCHARGE SUMMARY
"[facility] Medicine  Discharge Summary      Patient Name: Alexa Otero  MRN: [id number]  LISA: [id number]  Patient Class: OP- Observation  Admission Date: 3/18/2023  Hospital Length of Stay: 0 days  Discharge Date and Time:  03/21/2023 4:12 PM  Attending Physician: No att. providers found   Discharging Provider: Zac Thurston MD  Primary Care Provider: Idalia Greco MD    Primary Care Team: Networked reference to record PCT     HPI:   A  82 y/o female has a history of hypertension, GERD, hyperlipidemia, diabetes, recurrent pneumonia     She presents complaining of body aches, fatigue, chills, poor appetite, nausea vomiting and diarrhea.  The patient has had symptoms over the past 24-36 hours.       She reports 10-12 episodes of emesis and several episodes of loose watery stool.  She denies any abdominal pain or gastrointestinal bleeding.      She has chronic cough which has been progressive, is to be evaluated by pulmonology as an outpatient for this.      In the ER patient had electrolyte deficiencies potassium and magnesium.  Her procalcitonin was elevated and a CT scan showed "Focal area of airspace opacity/consolidation involving the posterior segment right lower lobe no significantly changed when compared to prior study."     10 pt ros o/w negative      * No surgery found *      Hospital Course:   Patient is an 83-year-old female with a history of pneumonia.  She has had a cough for the last couple of months now and right lower lobe infiltrate.  She was admitted for further workup and IV antibiotics.  However, she is on room air and has normal WBC count.  She is afebrile and otherwise does not feel significantly short of breath.  Given the findings on imaging I will continue doxycycline for antibiotic treatment of presumed pneumonia for the next 7 days.  I have placed an urgent referral to Pulmonary for outpatient follow-up.  She will need repeat CT imaging to ensure resolution of " the area in question.  If this has not resolved she will need likely bronchoscopy versus biopsy with Pulmonary as an outpatient.  I I discussed this treatment plan with the patient and she is in agreement.  We reviewed the discharge plan of care instructions on the day of discharge.  She was discharged in good condition with plans for close outpatient follow-up.       Goals of Care Treatment Preferences:  Code Status: Full Code    Living Will: Yes              Consults:   Consults (From admission, onward)        Status Ordering Provider     Inpatient consult to Pulmonology  Once        Provider:  MD Rowena Beck CHRISTOPHER A.          No new Assessment & Plan notes have been filed under this hospital service since the last note was generated.  Service: Hospital Medicine    Final Active Diagnoses:    Diagnosis Date Noted POA    PRINCIPAL PROBLEM:  Pneumonia [J18.9] 03/19/2023 Unknown      Problems Resolved During this Admission:       Discharged Condition: good    Disposition: Home or Self Care    Follow Up:   Follow-up Information     Idalia Greco MD. Schedule an appointment as soon as possible for a visit on 3/29/2023.    Specialty: Family Medicine  Why: Hospital follow up with Ольга Ca NP at 1:30 pm  Contact information:  [address] [place] [address]  [phone]             Jj Padilla MD. Schedule an appointment as soon as possible for a visit in 1 week(s).    Specialty: Pulmonary Disease  Contact information:  [address]  [id number]  [facility] [place] [address]  [phone]                       Patient Instructions:      Ambulatory referral/consult to Pulmonology   Standing Status: Future   Referral Priority: Urgent Referral Type: Consultation   Referral Reason: Specialty Services Required   Referred to Provider: JJ PADILLA Requested Specialty: Pulmonary Disease   Number of Visits Requested: 1     Diet Adult Regular     No  dressing needed     Activity as tolerated       Significant Diagnostic Studies: Labs:   BMP:   Recent Labs   Lab 03/20/23  0443 03/21/23  0434    95    139   K 4.4 4.0    104   CO2 26 28   BUN 16 13   CREATININE 1.0 0.9   CALCIUM 8.6* 8.6*   MG 1.9 1.6   , CBC   Recent Labs   Lab 03/20/23  0443 03/21/23  0434   WBC 6.77 5.73   HGB 9.2* 9.2*   HCT 29.1* 29.1*    216    and All labs within the past 24 hours have been reviewed    Pending Diagnostic Studies:     Procedure Component Value Units Date/Time    Cytology Specimen- Pulmonary Medical Cytology [[id number]] Collected: 03/21/23 1341    Order Status: Sent Lab Status: No result     Specimen: Sputum          Medications:  Reconciled Home Medications:      Medication List      START taking these medications    doxycycline 100 MG Cap  Commonly known as: VIBRAMYCIN  Take 1 capsule (100 mg total) by mouth every 12 (twelve) hours. for 7 days        CONTINUE taking these medications    albuterol 90 mcg/actuation inhaler  Commonly known as: PROVENTIL/VENTOLIN HFA  INHALE 2 PUFFS INTO THE LUNGS FOUR TIMES DAILY     benazepriL 40 MG tablet  Commonly known as: LOTENSIN  TAKE 1 TABLET(40 MG) BY MOUTH EVERY DAY     chlorthalidone 25 MG Tab  Commonly known as: HYGROTEN  Take 1 tablet (25 mg total) by mouth once daily.     gabapentin 100 MG capsule  Commonly known as: NEURONTIN  TAKE 1 CAPSULE(100 MG) BY MOUTH TWICE DAILY     ICAPS AREDS ORAL  Take 1 capsule by mouth 2 (two) times a day.     metFORMIN 500 MG tablet  Commonly known as: GLUCOPHAGE  TAKE 1 TABLET(500 MG) BY MOUTH TWICE DAILY WITH MEALS     omeprazole 40 MG capsule  Commonly known as: PRILOSEC  TAKE 1 CAPSULE(40 MG) BY MOUTH EVERY DAY     simvastatin 40 MG tablet  Commonly known as: ZOCOR  TAKE 1 TABLET(40 MG) BY MOUTH EVERY EVENING        STOP taking these medications    levoFLOXacin 500 MG tablet  Commonly known as: LEVAQUIN     ondansetron 4 MG Tbdl  Commonly known as: ZOFRAN-ODT             Indwelling Lines/Drains at time of discharge:   Lines/Drains/Airways     None                 Time spent on the discharge of patient: 50 minutes         Zac Thurston MD  Department of Hospital Medicine  [facility]

## 2023-03-21 NOTE — CONSULTS
Pulmonary/Critical Care Consult      PATIENT NAME: Alexa Otero  MRN: [id number]  TODAY'S DATE: 2023  ADMIT DATE: 3/18/2023  AGE: 83 y.o. : 1940    CONSULT REQUESTED BY: Zac Thurston MD    REASON FOR CONSULT:   Non-resolving pneumonia    HISTORY OF PRESENT ILLNESS   Alexa Otero is a 83 y.o. female with a PMH of ovarian cancer s/p hysterectomy and oophorectomy and chemo 2 years ago, Kwon's esophagus, HTN, and DM2 on whom we have been consulted for recurrent pneumonia.    The patient was admitted 3/18/23 with nausea and vomiting, as well as diarrhea. Her chest CT showed RLL opacities similar to 2 months ago, when she was treated for pneumonia. The patient reports she has had a chronic cough for about 2 years productive of clear sputum. In the past week it has become yellow. She also has had abdominal pain and nausea and vomiting for the past few days. However, she has had diarrhea for over a year. She often wakes up in the middle of the night to eat to ease her stomach pain, and she sleeps lying on her right side. Of note, she has a history of Kwon's esophagus requiring ablation in the past, but she denies any sensation of acid reflux ever. She takes omeprazole daily.      SMOKING HISTORY  Quit at age 41. Smoked 1 PPD for 25 years.      REVIEW OF SYSTEMS  As above.    ALLERGIES  Review of patient's allergies indicates:  No Known Allergies    INPATIENT SCHEDULED MEDICATIONS   azithromycin  500 mg Intravenous Q24H    cefTRIAXone (ROCEPHIN) IVPB  1 g Intravenous Q24H    chlorhexidine  15 mL Mouth/Throat BID    enoxaparin  40 mg Subcutaneous Daily    gabapentin  100 mg Oral BID    guaiFENesin  1,200 mg Oral BID    mupirocin   Nasal BID      lactated ringers 125 mL/hr at 23 1703       MEDICAL AND SURGICAL HISTORY  Past Medical History:   Diagnosis Date    Arthritis     Cataract     Colon polyp     Diabetes mellitus type II     Encounter for blood transfusion     Extra-ovarian  endometrioid adenocarcinoma 2020    GERD (gastroesophageal reflux disease)     Hyperlipidemia     Hypertension     Macular degeneration     PONV (postoperative nausea and vomiting)     Squamous cell carcinoma 1980's    precancer of cervix     Past Surgical History:   Procedure Laterality Date    AUGMENTATION OF BREAST      CHOLECYSTECTOMY      COLONOSCOPY      ESOPHAGOGASTRODUODENOSCOPY N/A 06/20/2018    Procedure: EGD (ESOPHAGOGASTRODUODENOSCOPY);  Surgeon: Sabino Stephenson MD;  Location: [facility];  Service: Endoscopy;  Laterality: N/A;    HYSTERECTOMY      partial    INJECTION, SACROILIAC JOINT Right 1/26/2023    Procedure: INJECTION,SACROILIAC JOINT;  Surgeon: Agustin Robles MD;  Location: [facility];  Service: Pain Management;  Laterality: Right;    INSERTION OF TUNNELED CENTRAL VENOUS CATHETER (CVC) WITH SUBCUTANEOUS PORT Right 10/19/2020    Procedure: INSERTION, PORT-A-CATH;  Surgeon: Misti Mcfarland MD;  Location: [facility];  Service: General;  Laterality: Right;    INTRAMEDULLARY RODDING OF TROCHANTER OF FEMUR Right 11/28/2021    Procedure: INSERTION, INTRAMEDULLARY LUC, FEMUR, TROCHANTER/RIGHT TFN DR FAJARDO NOTIFIED REP;  Surgeon: Kp Fajardo MD;  Location: [facility];  Service: Orthopedics;  Laterality: Right;  SKIP    ROBOT-ASSISTED LAPAROSCOPIC LYMPHADENECTOMY USING DA NELLA XI N/A 09/21/2020    Procedure: XI ROBOTIC LYMPHADENECTOMY-pelvic and kell-aortic;  Surgeon: Altagracia Ray MD;  Location: [facility];  Service: OB/GYN;  Laterality: N/A;    ROBOT-ASSISTED LAPAROSCOPIC OMENTECTOMY USING DA NELLA XI N/A 09/21/2020    Procedure: XI ROBOTIC OMENTECTOMY;  Surgeon: Altagracia Ray MD;  Location: [facility] OR;  Service: OB/GYN;  Laterality: N/A;    ROBOT-ASSISTED LAPAROSCOPIC SALPINGO-OOPHORECTOMY USING DA NELLA XI Bilateral 09/21/2020    Procedure: XI ROBOTIC SALPINGO-OOPHORECTOMY;  Surgeon: Altagracia Ray MD;  Location: [facility] OR;  Service: OB/GYN;  Laterality: Bilateral;    UPPER GASTROINTESTINAL  ENDOSCOPY         ALCOHOL, TOBACCO AND DRUG USE  Social History     Tobacco Use   Smoking Status Former    Packs/day: 1.00    Years: 20.00    Pack years: 20.00    Types: Cigarettes   Smokeless Tobacco Never   Tobacco Comments    quit 40 yrs ago     Social History     Substance and Sexual Activity   Alcohol Use Yes    Alcohol/week: 2.0 standard drinks    Types: 2 Glasses of wine per week    Comment: occasional     Social History     Substance and Sexual Activity   Drug Use No       FAMILY HISTORY  Family History   Problem Relation Age of Onset    Cancer Mother 57        lung    Cancer Father 56        oral    Skin cancer Sister     Skin cancer Brother     Melanoma Brother     Skin cancer Brother     Breast cancer Maternal Grandmother     Breast cancer Paternal Grandmother     Colon polyps Daughter     Psoriasis Neg Hx     Lupus Neg Hx     Eczema Neg Hx     Colon cancer Neg Hx     Crohn's disease Neg Hx     Esophageal cancer Neg Hx     Stomach cancer Neg Hx     Ulcerative colitis Neg Hx        VITAL SIGNS (MOST RECENT)  Temp: 99.3 °F (37.4 °C) (03/21/23 0701)  Pulse: (!) 112 (03/21/23 0830)  Resp: (!) 36 (03/21/23 0400)  BP: 138/65 (03/21/23 0830)  SpO2: (!) 73 % (03/21/23 0830)    INTAKE AND OUTPUT (LAST 24 HOURS):  Intake/Output Summary (Last 24 hours) at 3/21/2023 1128  Last data filed at 3/21/2023 0730  Gross per 24 hour   Intake 2368.75 ml   Output 1200 ml   Net 1168.75 ml       WEIGHT  Wt Readings from Last 1 Encounters:   03/19/23 59 kg (130 lb)       PHYSICAL EXAM  GENERAL: A&O. NAD.  HEENT: Extraocular movements intact. Pharynx moist.  NECK: Supple. No JVD or hepatojugular reflux.  HEART: Regular rate and normal rhythm. No murmur or gallop auscultated.  LUNGS: Clear to auscultation and percussion. Lung excursion symmetrical.   ABDOMEN: Soft, non-tender, non-distended, no masses palpated.  EXTREMITIES: Normal muscle tone and joint movement, no cyanosis or clubbing.   LYMPHATICS: No adenopathy palpated, no  edema.  SKIN: Dry, intact, no lesions.   NEURO: No gross deficit.  PSYCH: Appropriate affect    ACUTE PHASE REACTANT (LAST 24 HOURS)  No results for input(s): FERRITIN, CRP, LDH, DDIMER in the last 24 hours.    CBC LAST (LAST 24 HOURS)  Recent Labs   Lab 03/21/23  0434   WBC 5.73   RBC 3.10*   HGB 9.2*   HCT 29.1*   MCV 94   MCH 29.7   MCHC 31.6*   RDW 14.9*      MPV 9.7   GRAN 69.2  4.0   LYMPH 16.6*  1.0   MONO 11.2  0.6   BASO 0.01   NRBC 0       CHEMISTRY LAST (LAST 24 HOURS)  Recent Labs   Lab 03/21/23  0434      K 4.0      CO2 28   ANIONGAP 7*   BUN 13   CREATININE 0.9   GLU 95   CALCIUM 8.6*   MG 1.6   ALBUMIN 2.8*   PROT 5.6*   ALKPHOS 39*   ALT 12   AST 10   BILITOT 0.7       COAGULATION LAST (LAST 24 HOURS)  No results for input(s): LABPT, INR, APTT in the last 24 hours.    CARDIAC PROFILE (LAST 24 HOURS)  Recent Labs   Lab 03/18/23 2031   *   CPK 35       LAST 7 DAYS MICROBIOLOGY   Microbiology Results (last 7 days)       Procedure Component Value Units Date/Time    Stool culture [[id number]] Collected: 03/20/23 1306    Order Status: Completed Specimen: Stool Updated: 03/21/23 0729     Stool Culture Nothing significant to date    Culture, Respiratory with Gram Stain [[id number]] Collected: 03/20/23 1136    Order Status: Completed Specimen: Sputum Updated: 03/21/23 0708     Respiratory Culture Normal respiratory cece     Gram Stain (Respiratory) <10 epithelial cells per low power field.     Gram Stain (Respiratory) Few WBC's     Gram Stain (Respiratory) Moderate Gram positive cocci     Gram Stain (Respiratory) Few Gram negative rods    Narrative:      Patient reports blood tinged    Culture, Respiratory with Gram Stain [[id number]] Collected: 03/19/23 0257    Order Status: Completed Specimen: Respiratory from Sputum, Expectorated Updated: 03/21/23 0707     Respiratory Culture Normal respiratory cece     Gram Stain (Respiratory) <10 epithelial cells per low power field.     Gram  Stain (Respiratory) Few WBC's     Gram Stain (Respiratory) Few Gram positive cocci     Gram Stain (Respiratory) Few Gram negative rods    Blood culture #2 **CANNOT BE ORDERED STAT** [[id number]] Collected: 03/18/23 2031    Order Status: Completed Specimen: Blood from Line, Port A Cath Updated: 03/20/23 2232     Blood Culture, Routine No Growth to date      No Growth to date      No Growth to date    Blood culture #1 **CANNOT BE ORDERED STAT** [[id number]] Collected: 03/18/23 2054    Order Status: Completed Specimen: Blood from Peripheral, Hand, Left Updated: 03/20/23 2232     Blood Culture, Routine No Growth to date      No Growth to date      No Growth to date    Clostridium difficile EIA [[id number]]     Order Status: No result Specimen: Stool     MRSA Screen by PCR [[id number]] Collected: 03/19/23 0257    Order Status: Completed Specimen: Nasopharyngeal Swab from Nasal Updated: 03/19/23 0545     MRSA SCREEN BY PCR Negative    Clostridium difficile EIA [[id number]]     Order Status: Canceled Specimen: Stool             MOST RECENT IMAGING  X-Ray Chest AP Portable  HISTORY: vomiting    COMPARISON:March 3, 2023    FINDINGS:Right subclavian infusion catheter is noted with tip projecting over the entry point the superior vena cava. Calcified breast implants again projecting over the lower chest. Lungs are well expanded and free of active disease. Mild bibasilar atelectasis/infiltrate more prominent towards the right with improvement.. Pulmonary vascularity is without evidence of engorgement. The thoracic spine mild convexity towards the right.    IMPRESSION:  1. No evidence of active process.  2. Right basilar atelectasis/infiltrate with nominal improvement.    Electronically signed by:  Anton Guzman MD  3/19/2023 6:18 AM CDT Workstation: [id number]  CT Abdomen Pelvis With Contrast  EXAM DESCRIPTION: CT ABDOMEN PELVIS WITH CONTRAST 3/19/2023 12:21 AM CDT    CLINICAL HISTORY: 83 years, Female, Epigastric  pain    COMPARISON: None    PROCEDURE:    Contrast-enhanced images of the abdomen and pelvis were performed utilizing 2 mm slice thickness at 2 mm interval reconstruction from the lung bases to the ischial tuberosities after the administration of IV contrast.  In addition multiplanar reformats in the coronal and sagittal plane were obtained and reviewed.  This exam was performed according to our departmental dose-optimization protocol, which includes automated exposure control, adjustment of the mA and/or kV according to patient size and/or use of iterative reconstruction technique.    FINDINGS:    Evaluation of the chest will give superior report    The liver demonstrated presence of subcapsular right hepatic lobe cyst measuring 4.1 x 2.2 cm on image 76. There is a status post cholecystomy. Radiodensity within the peritoneum, anterior to the pancreatic head could correspond perhaps to dystrophic rim calcification/or large calculus from previous cholecystectomy.  The pancreas, spleen and adrenal glands demonstrate to be unremarkable, no focal lesions are noted.  The kidneys demonstrate normal uptake of contrast media. No evidence for nephrolithiasis and/or hydronephrosis. Small bilateral simple renal cysts too small to characterize by CT criteria. No follow-up is recommended.  Grossly the unopacified stomach, small bowel and large bowel demonstrate to be within normal limits.  There is no evidence for bowel dilatation/or free air.  The appendix is normal. The left site colon is decompressed. There is diverticulosis within the sigmoid colon.  The urinary bladder demonstrate to be unremarkable.  The uterus is absent. There are no adnexal masses.  The aorta demonstrate atherosclerotic disease extending into the aortic location.  There is no retroperitoneal lymphadenopathy. There is no evidence for ascites/or significant abnormal fluid collections..  The bone windows demonstrate diffuse bony osteopenia. Degenerative  disc disease at L1-L3. ORIF right hip joint.    IMPRESSION:  No acute intra-abdominal process.    Status post cholecystectomy and hysterectomy.    Hepatic cyst.    Atherosclerotic disease of the aorta.    Sigmoid diverticulosis without evidence of acute diverticulitis.    Degenerative disc disease at L1-L3.    Electronically signed by:  Agustin Fontana MD  3/19/2023 12:26 AM CDT Workstation: [id number]  CTA Chest Non-Coronary (PE Studies)  EXAM DESCRIPTION: CTA CHEST NON CORONARY (PE STUDIES) 3/19/2023 12:15 AM CDT    CLINICAL HISTORY: 83 years, Female, Pulmonary embolism (PE) suspected, high prob    COMPARISON: None    PROCEDURE:  Multiple transaxial tomograms of the chest were obtained from the lung apices through the lung bases utilizing 1 mm slice thickness at 1 mm interval reconstruction after the administration of 100 cc of Omnipaque 350 for complete opacification of the pulmonary arteries.  Subsequent 3-D maximum intensity projection images were generated in the coronal and sagittal plane for review.  This exam was performed according to our departmental dose-optimization protocol, which includes automated exposure control, adjustment of the mA and/or kV according to patient size and/or use of iterative reconstruction technique.    FINDINGS:  The lungs parenchyma demonstrate the presence of centrilobular and paraseptal emphysematous changes. There is a focal area of airspace opacity/consolidation involving the posterior segment right lower lobe no significantly changed when compared to prior study. No significant masses, nodules and/or consolidations are identified.  The trachea mainstem bronchus demonstrate to be normal. There is bronchial some of the right lower lobe demonstrate to be patent. There is no significant pericardial or pleural effusions.  The thoracic aorta demonstrate intimal aortic arch ossification. No evidence for thoracic aortic aneurysm/or dissection. The heart is normal in size. There are  coronary artery calcification. Minimal aortic valvular calcification. No evidence for right ventricular strain.  There are prominent right hilar lymph node measuring 1.1 cm on image 116, 1.1 cm on image 119, right infrahilar area measuring 1.1 cm on image 143 and subcarinal region measuring 1.2 cm on image 123. The axillary regions demonstrate to be clear.  Pulmonary arteries demonstrate to be normal, no intraluminal defect are seen that would suggest pulmonary embolus.  The bone windows demonstrate demonstrate minimal anterior spondylosis lower thoracic spine. Inferior deformity with minimal increase sclerosis at T11 perhaps suggesting old compression deformity.  There are bilateral breast implants with rim wall calcification.  Evaluation of the abdomen and pelvis will be given in separate report.    IMPRESSION:  No CT evidence for pulmonary embolism.    Focal area of airspace opacity/consolidation involving the posterior segment right lower lobe no significantly changed when compared to prior study.    Emphysematous changes.    Prominent right hilar and subcarinal lymph nodes, may be reactive in nature.    Inferior deformity with minimal increase sclerosis at T11 perhaps suggesting old compression deformity.    Electronically signed by:  Agustin Fontana MD  3/19/2023 12:21 AM CDT Workstation: [id number]      CURRENT VISIT EKG  Results for orders placed or performed during the hospital encounter of 03/18/23   EKG 12-lead    Narrative    Test Reason : R11.10,    Vent. Rate : 125 BPM     Atrial Rate : 125 BPM     P-R Int : 134 ms          QRS Dur : 062 ms      QT Int : 304 ms       P-R-T Axes : 057 034 086 degrees     QTc Int : 438 ms    Sinus tachycardia  Nonspecific ST and T wave abnormality  Abnormal ECG  When compared with ECG of 27-NOV-2021 17:03,  ST now depressed in Lateral leads  T wave inversion now evident in Lateral leads  Confirmed by Jose Corcoran MD ([id number]) on 3/19/2023 2:36:57 PM    Referred By:  [email or web address]   SELF           Confirmed By:Jose Corcoran MD       ECHOCARDIOGRAM RESULTS  No results found for this or any previous visit.        RESPIRATORY SUPPORT          LAST ARTERIAL BLOOD GAS  ABG  No results for input(s): PH, PO2, PCO2, HCO3, BE in the last 168 hours.    I have personally reviewed the patient's CTA (PE study) from this admission, as well as her CT chest from 2/14/23, PET CT from 2/2023, and her PET CT from 4/2021. The current CT is similar to those of last month, with dense consolidation in the RLL, which could represent pneumonia that simply has not cleared up on follow up scan yet. Malignancy, such as bronchoalveolar carcinoma is also a possibility. There is abnormal FDG uptake ini the RLL densities, but this is consistent with any hypermetabolic process, including both infection and malignancy. The PET scan of 4/2021 also shows a small amount of subpleural consolidation in the RLL, but if this were malignancy, it would have to be extremely slow growing. There is no FDG-avid LAD on either PET CT.      IMPRESSION AND PLAN  Alexa Otero is a 83 y.o. female with a PMH of ovarian cancer s/p hysterectomy and oophorectomy and chemo 2 years ago, Kwon's esophagus, HTN,and DM2 on whom we have been consulted for recurrent pneumonia.    #Aspiration pneumonia  #Chronic cough  #Subacute and chronic CT abnormalities of RLL  Likely recurrent aspiration and possibly a current pneumonia, considering elevated procalcitonin on 3/18. Please see my interpretation of recent chest imaging above.   - complete 5 days of antibiotics for CAP; may convert to oral upon d/c  - f/u cultures  - f/u Legionella Ag  - f/u SLP eval, Barium swallow  - sputum cytology ordered  - f/u with pulmonary outpatient; was previously scheduled to f/u w/ Dr. Christian    Discussed with RN.    Dispo: safe for d/c from a pulmonary perspective.    Skip Tariq MD  [facility]  [place]  Department of Pulmonology  Date of Service: 03/21/2023  11:28 AM

## 2023-03-21 NOTE — HPI
"A  84 y/o female has a history of hypertension, GERD, hyperlipidemia, diabetes, recurrent pneumonia     She presents complaining of body aches, fatigue, chills, poor appetite, nausea vomiting and diarrhea.  The patient has had symptoms over the past 24-36 hours.       She reports 10-12 episodes of emesis and several episodes of loose watery stool.  She denies any abdominal pain or gastrointestinal bleeding.      She has chronic cough which has been progressive, is to be evaluated by pulmonology as an outpatient for this.      In the ER patient had electrolyte deficiencies potassium and magnesium.  Her procalcitonin was elevated and a CT scan showed "Focal area of airspace opacity/consolidation involving the posterior segment right lower lobe no significantly changed when compared to prior study."     10 pt ros o/w negative  "

## 2023-03-22 LAB
BACTERIA SPEC AEROBE CULT: NORMAL
GRAM STN SPEC: NORMAL

## 2023-03-23 LAB
BACTERIA BLD CULT: NORMAL
BACTERIA BLD CULT: NORMAL
STOOL CULTURE: NORMAL

## 2023-03-27 ENCOUNTER — TELEPHONE (OUTPATIENT)
Dept: PAIN MEDICINE | Facility: CLINIC | Age: 83
End: 2023-03-27
Payer: MEDICARE

## 2023-03-27 ENCOUNTER — OFFICE VISIT (OUTPATIENT)
Dept: PULMONOLOGY | Facility: CLINIC | Age: 83
End: 2023-03-27
Payer: MEDICARE

## 2023-03-27 ENCOUNTER — NURSE TRIAGE (OUTPATIENT)
Dept: ADMINISTRATIVE | Facility: CLINIC | Age: 83
End: 2023-03-27
Payer: MEDICARE

## 2023-03-27 VITALS
OXYGEN SATURATION: 96 % | DIASTOLIC BLOOD PRESSURE: 58 MMHG | WEIGHT: 122.81 LBS | BODY MASS INDEX: 21.76 KG/M2 | HEART RATE: 102 BPM | SYSTOLIC BLOOD PRESSURE: 124 MMHG | TEMPERATURE: 98 F

## 2023-03-27 DIAGNOSIS — J43.2 CENTRILOBULAR EMPHYSEMA: ICD-10-CM

## 2023-03-27 DIAGNOSIS — J69.0 ASPIRATION PNEUMONIA OF RIGHT LOWER LOBE DUE TO GASTRIC SECRETIONS: Primary | ICD-10-CM

## 2023-03-27 PROCEDURE — 99204 PR OFFICE/OUTPT VISIT, NEW, LEVL IV, 45-59 MIN: ICD-10-PCS | Mod: S$GLB,,, | Performed by: INTERNAL MEDICINE

## 2023-03-27 PROCEDURE — 3074F SYST BP LT 130 MM HG: CPT | Mod: CPTII,S$GLB,, | Performed by: INTERNAL MEDICINE

## 2023-03-27 PROCEDURE — 1126F AMNT PAIN NOTED NONE PRSNT: CPT | Mod: CPTII,S$GLB,, | Performed by: INTERNAL MEDICINE

## 2023-03-27 PROCEDURE — 99204 OFFICE O/P NEW MOD 45 MIN: CPT | Mod: S$GLB,,, | Performed by: INTERNAL MEDICINE

## 2023-03-27 PROCEDURE — 1157F PR ADVANCE CARE PLAN OR EQUIV PRESENT IN MEDICAL RECORD: ICD-10-PCS | Mod: CPTII,S$GLB,, | Performed by: INTERNAL MEDICINE

## 2023-03-27 PROCEDURE — 3078F DIAST BP <80 MM HG: CPT | Mod: CPTII,S$GLB,, | Performed by: INTERNAL MEDICINE

## 2023-03-27 PROCEDURE — 1157F ADVNC CARE PLAN IN RCRD: CPT | Mod: CPTII,S$GLB,, | Performed by: INTERNAL MEDICINE

## 2023-03-27 PROCEDURE — 3078F PR MOST RECENT DIASTOLIC BLOOD PRESSURE < 80 MM HG: ICD-10-PCS | Mod: CPTII,S$GLB,, | Performed by: INTERNAL MEDICINE

## 2023-03-27 PROCEDURE — 1126F PR PAIN SEVERITY QUANTIFIED, NO PAIN PRESENT: ICD-10-PCS | Mod: CPTII,S$GLB,, | Performed by: INTERNAL MEDICINE

## 2023-03-27 PROCEDURE — 1159F PR MEDICATION LIST DOCUMENTED IN MEDICAL RECORD: ICD-10-PCS | Mod: CPTII,S$GLB,, | Performed by: INTERNAL MEDICINE

## 2023-03-27 PROCEDURE — 1159F MED LIST DOCD IN RCRD: CPT | Mod: CPTII,S$GLB,, | Performed by: INTERNAL MEDICINE

## 2023-03-27 PROCEDURE — 3074F PR MOST RECENT SYSTOLIC BLOOD PRESSURE < 130 MM HG: ICD-10-PCS | Mod: CPTII,S$GLB,, | Performed by: INTERNAL MEDICINE

## 2023-03-27 RX ORDER — BENZONATATE 100 MG/1
200 CAPSULE ORAL 3 TIMES DAILY PRN
Qty: 90 CAPSULE | Refills: 11 | Status: SHIPPED | OUTPATIENT
Start: 2023-03-27 | End: 2023-04-06

## 2023-03-27 NOTE — TELEPHONE ENCOUNTER
Pt cancelled procedure for 3/28/23 d/t d/c'ed out of hospitasl today for GI issues per pt, surgical center, [facility] , notified. Pt said if decided to do it would call back to reschedule later.

## 2023-03-27 NOTE — TELEPHONE ENCOUNTER
Pt calling states she was discharged from hospital on 3/21/23. Reports she has had diarrhea and vomiting since. Reports she is having about 7 diarrhea episodes a day and vomiting anytime she eats. Reports she is on antibiotic. Pt is concerned as she has colonoscopy scheduled this week. Per protocol advised to be seen within 4 hours. verbalized understanding. Denies any further questions or concerns at this time, advised to call back if they have any that come up.   Advised pt to call back with any other concerns or worsening symptoms. Verbalized understanding and will route message to provider.     Reason for Disposition   SEVERE diarrhea (e.g., 7 or more times / day more than normal)    Additional Information   Negative: Shock suspected (e.g., cold/pale/clammy skin, too weak to stand, low BP, rapid pulse)   Negative: Difficult to awaken or acting confused (e.g., disoriented, slurred speech)   Negative: Sounds like a life-threatening emergency to the triager   Negative: SEVERE abdominal pain (e.g., excruciating)   Negative: [1] Blood in the stool AND [2] moderate or large amount of blood (e.g., any blood clots, passing blood without stool, toilet water turns red)   Negative: Black or tarry bowel movements  (Exception: chronic-unchanged black-grey bowel movements AND is taking iron pills or Pepto-Bismol)   Negative: [1] Drinking very little AND [2] dehydration suspected (e.g., no urine > 12 hours, very dry mouth, very lightheaded)   Negative: Patient sounds very sick or weak to the triager    Protocols used: Diarrhea on Antibiotics-A-

## 2023-03-27 NOTE — TELEPHONE ENCOUNTER
Returned call to patient to follow up. Advise patient that her symptoms were most like associated with the 7 days of antibiotics that she is currently taking patient states that her last doses will one 1 today and 1 tomorrow. Patient advise that she will need to be sure to stay hydrated for the rest of the week as she has diarrhea days and will be doing colonoscopy prep in Thursday. Patient verbalized understanding.

## 2023-03-27 NOTE — PROGRESS NOTES
SUBJECTIVE:    Patient ID: Alexa Otero is a 83 y.o. female.    Chief Complaint: New Parient (New Patient/Hospital follow up pneumonia/)    HPI The patient is here not eating or drinking because her antibiotics are making her sick.  She is on Augmentin and doxycycline.  She vomited as recently as last night.  She has been taking it on an empty stomach. She is not short of breath.  Her cough is better with Tessalon perles.  She has Kwon's esophagitis and eats during the night and sleeeps on her R side.  She is producing clear mucus.  She has a persisting right lower lobe dense pneumonia which was seen on his CT in February and then again in March she does not perceive she has reflux but has Kwon's esophagitis; she eats peppermints to help her cough.      Past Medical History:   Diagnosis Date    Arthritis     Cataract     Colon polyp     Diabetes mellitus type II 2012    Encounter for blood transfusion     Extra-ovarian endometrioid adenocarcinoma 2020    GERD (gastroesophageal reflux disease)     Hyperlipidemia     Hypertension     Macular degeneration     PONV (postoperative nausea and vomiting)     Squamous cell carcinoma 1980's    precancer of cervix     Past Surgical History:   Procedure Laterality Date    AUGMENTATION OF BREAST      CHOLECYSTECTOMY      COLONOSCOPY      ESOPHAGOGASTRODUODENOSCOPY N/A 06/20/2018    Procedure: EGD (ESOPHAGOGASTRODUODENOSCOPY);  Surgeon: Sabino Stephenson MD;  Location: [facility];  Service: Endoscopy;  Laterality: N/A;    HYSTERECTOMY      partial    INJECTION, SACROILIAC JOINT Right 1/26/2023    Procedure: INJECTION,SACROILIAC JOINT;  Surgeon: Agustin Robles MD;  Location: [facility] OR;  Service: Pain Management;  Laterality: Right;    INSERTION OF TUNNELED CENTRAL VENOUS CATHETER (CVC) WITH SUBCUTANEOUS PORT Right 10/19/2020    Procedure: INSERTION, PORT-A-CATH;  Surgeon: Misti Mcfarland MD;  Location: [facility] OR;  Service: General;  Laterality: Right;    INTRAMEDULLARY RODDING OF  TROCHANTER OF FEMUR Right 11/28/2021    Procedure: INSERTION, INTRAMEDULLARY LUC, FEMUR, TROCHANTER/RIGHT TFN DR FAJARDO NOTIFIED REP;  Surgeon: Kp Fajardo MD;  Location: [facility] OR;  Service: Orthopedics;  Laterality: Right;  SKIP    ROBOT-ASSISTED LAPAROSCOPIC LYMPHADENECTOMY USING DA NELLA XI N/A 09/21/2020    Procedure: XI ROBOTIC LYMPHADENECTOMY-pelvic and kell-aortic;  Surgeon: Altagracia Ray MD;  Location: [facility] OR;  Service: OB/GYN;  Laterality: N/A;    ROBOT-ASSISTED LAPAROSCOPIC OMENTECTOMY USING DA NELLA XI N/A 09/21/2020    Procedure: XI ROBOTIC OMENTECTOMY;  Surgeon: Altagracia Ray MD;  Location: [facility] OR;  Service: OB/GYN;  Laterality: N/A;    ROBOT-ASSISTED LAPAROSCOPIC SALPINGO-OOPHORECTOMY USING DA NELLA XI Bilateral 09/21/2020    Procedure: XI ROBOTIC SALPINGO-OOPHORECTOMY;  Surgeon: Altagracia Ray MD;  Location: [facility] OR;  Service: OB/GYN;  Laterality: Bilateral;    UPPER GASTROINTESTINAL ENDOSCOPY       Family History   Problem Relation Age of Onset    Cancer Mother 57        lung    Cancer Father 56        oral    Skin cancer Sister     Skin cancer Brother     Melanoma Brother     Skin cancer Brother     Breast cancer Maternal Grandmother     Breast cancer Paternal Grandmother     Colon polyps Daughter     Psoriasis Neg Hx     Lupus Neg Hx     Eczema Neg Hx     Colon cancer Neg Hx     Crohn's disease Neg Hx     Esophageal cancer Neg Hx     Stomach cancer Neg Hx     Ulcerative colitis Neg Hx         Social History:   Marital Status:   Occupation: Data Unavailable  Alcohol History:  reports current alcohol use of about 2.0 standard drinks per week.  Tobacco History:  reports that she has quit smoking. Her smoking use included cigarettes. She has a 20.00 pack-year smoking history. She has never used smokeless tobacco.  Drug History:  reports no history of drug use.    Review of patient's allergies indicates:  No Known Allergies    Current Outpatient Medications    Medication Sig Dispense Refill    amoxicillin-clavulanate 875-125mg (AUGMENTIN) 875-125 mg per tablet Take 1 tablet by mouth 2 (two) times daily. for 7 days 14 tablet 0    benazepriL (LOTENSIN) 40 MG tablet TAKE 1 TABLET(40 MG) BY MOUTH EVERY DAY 90 tablet 2    doxycycline (VIBRAMYCIN) 100 MG Cap Take 1 capsule (100 mg total) by mouth every 12 (twelve) hours. for 7 days 14 capsule 0    gabapentin (NEURONTIN) 100 MG capsule TAKE 1 CAPSULE(100 MG) BY MOUTH TWICE DAILY 180 capsule 3    metFORMIN (GLUCOPHAGE) 500 MG tablet TAKE 1 TABLET(500 MG) BY MOUTH TWICE DAILY WITH MEALS 180 tablet 3    omeprazole (PRILOSEC) 40 MG capsule TAKE 1 CAPSULE(40 MG) BY MOUTH EVERY DAY 90 capsule 3    simvastatin (ZOCOR) 40 MG tablet TAKE 1 TABLET(40 MG) BY MOUTH EVERY EVENING 90 tablet 0    VIT A/VIT C/VIT E/ZINC/COPPER (ICAPS AREDS ORAL) Take 1 capsule by mouth 2 (two) times a day.       benzonatate (TESSALON) 100 MG capsule Take 2 capsules (200 mg total) by mouth 3 (three) times daily as needed for Cough. 90 capsule 11     No current facility-administered medications for this visit.       Alpha-1 Antitrypsin:  Last PFT:   Last CT:3/18/23  IMPRESSION:  No CT evidence for pulmonary embolism.     Focal area of airspace opacity/consolidation involving the posterior segment right lower lobe no significantly changed when compared to prior study.   Emphysematous changes.   Prominent right hilar and subcarinal lymph nodes, may be reactive in nature.   Inferior deformity with minimal increase sclerosis at T11 perhaps suggesting old compression deformity.    Review of Systems  General: Feeling tired and shaky.  Eyes: Vision is good. She has macular degeneration.  ENT:  No sinusitis or pharyngitis.   Heart:: No chest pain or palpitations.  Lungs: Coughs, produces clear mucus.  Her cough keeps her  up at night  GI: nausea vomiting and diarrhea from antibiotics, she has silent reflux  : No dysuria, hesitancy, or  nocturia.  Musculoskeletal: has R hip pain  Skin: No lesions or rashes.  Neuro: No headaches or neuropathy.  Lymph: No edema or adenopathy.  Psych: No anxiety or depression.  Endo: Weight loss 7-8 pounds down    OBJECTIVE:      BP (!) 124/58 (BP Location: Right arm, Patient Position: Sitting, BP Method: Medium (Manual))   Pulse 102   Temp 97.8 °F (36.6 °C)   Wt 55.7 kg (122 lb 12.8 oz)   SpO2 96%   BMI 21.76 kg/m²     Physical Exam  GENERAL: Older patient in no distress.  HEENT: Pupils equal and reactive. Extraocular movements intact. Nose intact.      Pharynx moist.  NECK: Supple.   HEART: Regular rate and rhythm. No murmur or gallop auscultated.  LUNGS:  Dense crackles in the right base.. Lung excursion symmetrical. No change in fremitus.  ABDOMEN: Bowel sounds present. Non-tender, no masses palpated.  EXTREMITIES: Normal muscle tone and joint movement, no cyanosis or clubbing.   LYMPHATICS: No adenopathy palpated, no edema.  SKIN: Dry, intact, no lesions.   NEURO: Cranial nerves II-XII intact. Motor strength 5/5 bilaterally, upper and lower extremities.  PSYCH: Appropriate affect.    Assessment:       1. Aspiration pneumonia of right lower lobe due to gastric secretions    2. Centrilobular emphysema          Plan:       Aspiration pneumonia of right lower lobe due to gastric secretions  -     X-Ray Chest PA And Lateral; Future  -     benzonatate (TESSALON) 100 MG capsule; Take 2 capsules (200 mg total) by mouth 3 (three) times daily as needed for Cough.  Dispense: 90 capsule; Refill: 11    Centrilobular emphysema         Follow up in about 1 month (around 4/27/2023).  Head of bed up 4 inches  Continue omeprazole  No caffeine, chocolate, or peppermints in the evenings  Stop antibiotics  Will need to workup  with PFTs after she is over this pneumonia

## 2023-03-27 NOTE — TELEPHONE ENCOUNTER
----- Message from Darlene Bolaños sent at 3/27/2023  9:45 AM CDT -----  Contact: pt  Type:  Needs Medical Advice    Who Called: pt  Would the patient rather a call back or a response via [facility]? call  Best Call Back Number: [phone]  Additional Information: States she need a callback as soon as possible from the provider or nurse. Please advise thank you

## 2023-03-27 NOTE — PATIENT INSTRUCTIONS
Follow up in about 1 month (around 4/27/2023).  Bed up 4 inches  Continue omeprazole  No caffeine, chocolate, or peppermints in the evenings

## 2023-03-28 NOTE — TELEPHONE ENCOUNTER
I'm not sure I would advise doing the GI procedures when ill or even having side effects of the abx. I will forward to ZION Craven to advise.  I counseled the patient to increase intake of electrolyte rich fluids.  I advised the patient to avoid dairy products, greasy, high fiber, caffeinated and sugary foods while acutely ill and eat a bland diet.  Probiotics otc may be helpful.  If prescribed, the patient should take anti-emetics as needed as directed for nausea and vomiting.  The patient was warned to return to clinic or go to the emergency room if fever > 100.4, bloody or black tarry stools, severe abdominal pain, lethargy, dehydration, or if symptoms last longer than 10 days.

## 2023-03-28 NOTE — TELEPHONE ENCOUNTER
Call placed to Ms. Liu in regards to colonoscopy scheduled for 3/31 with Dr. Stephenson. I informed her with the symptoms she is having we should reschedule her procedure for a later date. She stated that she thinks it was from the antibiotics she was taking. She stated she was able to eat dinner last night for the first time in days. I informed her it would probably be best to reschedule. She stated she would like us to check with Dr. Christian and see what she thinks. I informed her I would send a message. She verbalized understanding. No further issues noted.

## 2023-03-28 NOTE — TELEPHONE ENCOUNTER
Pt notified that Dr. Christian advised she is okay to have her procedure on Friday. She verbalized understanding. No further issues noted.

## 2023-03-29 ENCOUNTER — OFFICE VISIT (OUTPATIENT)
Dept: FAMILY MEDICINE | Facility: CLINIC | Age: 83
End: 2023-03-29
Payer: MEDICARE

## 2023-03-29 VITALS
HEART RATE: 60 BPM | TEMPERATURE: 98 F | OXYGEN SATURATION: 95 % | DIASTOLIC BLOOD PRESSURE: 60 MMHG | WEIGHT: 123.88 LBS | SYSTOLIC BLOOD PRESSURE: 118 MMHG | BODY MASS INDEX: 22.8 KG/M2 | HEIGHT: 62 IN

## 2023-03-29 DIAGNOSIS — E78.5 HYPERLIPIDEMIA ASSOCIATED WITH TYPE 2 DIABETES MELLITUS: ICD-10-CM

## 2023-03-29 DIAGNOSIS — E11.59 HYPERTENSION ASSOCIATED WITH DIABETES: ICD-10-CM

## 2023-03-29 DIAGNOSIS — E11.22 CONTROLLED TYPE 2 DIABETES MELLITUS WITH STAGE 3 CHRONIC KIDNEY DISEASE, WITHOUT LONG-TERM CURRENT USE OF INSULIN: ICD-10-CM

## 2023-03-29 DIAGNOSIS — E11.69 HYPERLIPIDEMIA ASSOCIATED WITH TYPE 2 DIABETES MELLITUS: ICD-10-CM

## 2023-03-29 DIAGNOSIS — N18.30 CONTROLLED TYPE 2 DIABETES MELLITUS WITH STAGE 3 CHRONIC KIDNEY DISEASE, WITHOUT LONG-TERM CURRENT USE OF INSULIN: ICD-10-CM

## 2023-03-29 DIAGNOSIS — I15.2 HYPERTENSION ASSOCIATED WITH DIABETES: ICD-10-CM

## 2023-03-29 DIAGNOSIS — J69.0 ASPIRATION PNEUMONIA OF RIGHT LOWER LOBE DUE TO GASTRIC SECRETIONS: Primary | ICD-10-CM

## 2023-03-29 PROCEDURE — 99999 PR PBB SHADOW E&M-EST. PATIENT-LVL IV: CPT | Mod: PBBFAC,HCNC,, | Performed by: NURSE PRACTITIONER

## 2023-03-29 PROCEDURE — 99999 PR PBB SHADOW E&M-EST. PATIENT-LVL IV: ICD-10-PCS | Mod: PBBFAC,HCNC,, | Performed by: NURSE PRACTITIONER

## 2023-03-29 PROCEDURE — 1126F AMNT PAIN NOTED NONE PRSNT: CPT | Mod: HCNC,CPTII,S$GLB, | Performed by: NURSE PRACTITIONER

## 2023-03-29 PROCEDURE — 99214 OFFICE O/P EST MOD 30 MIN: CPT | Mod: HCNC,S$GLB,, | Performed by: NURSE PRACTITIONER

## 2023-03-29 PROCEDURE — 1160F RVW MEDS BY RX/DR IN RCRD: CPT | Mod: HCNC,CPTII,S$GLB, | Performed by: NURSE PRACTITIONER

## 2023-03-29 PROCEDURE — 1157F ADVNC CARE PLAN IN RCRD: CPT | Mod: HCNC,CPTII,S$GLB, | Performed by: NURSE PRACTITIONER

## 2023-03-29 PROCEDURE — 1101F PR PT FALLS ASSESS DOC 0-1 FALLS W/OUT INJ PAST YR: ICD-10-PCS | Mod: HCNC,CPTII,S$GLB, | Performed by: NURSE PRACTITIONER

## 2023-03-29 PROCEDURE — 99214 PR OFFICE/OUTPT VISIT, EST, LEVL IV, 30-39 MIN: ICD-10-PCS | Mod: HCNC,S$GLB,, | Performed by: NURSE PRACTITIONER

## 2023-03-29 PROCEDURE — 1160F PR REVIEW ALL MEDS BY PRESCRIBER/CLIN PHARMACIST DOCUMENTED: ICD-10-PCS | Mod: HCNC,CPTII,S$GLB, | Performed by: NURSE PRACTITIONER

## 2023-03-29 PROCEDURE — 1101F PT FALLS ASSESS-DOCD LE1/YR: CPT | Mod: HCNC,CPTII,S$GLB, | Performed by: NURSE PRACTITIONER

## 2023-03-29 PROCEDURE — 3288F PR FALLS RISK ASSESSMENT DOCUMENTED: ICD-10-PCS | Mod: HCNC,CPTII,S$GLB, | Performed by: NURSE PRACTITIONER

## 2023-03-29 PROCEDURE — 1126F PR PAIN SEVERITY QUANTIFIED, NO PAIN PRESENT: ICD-10-PCS | Mod: HCNC,CPTII,S$GLB, | Performed by: NURSE PRACTITIONER

## 2023-03-29 PROCEDURE — 3078F DIAST BP <80 MM HG: CPT | Mod: HCNC,CPTII,S$GLB, | Performed by: NURSE PRACTITIONER

## 2023-03-29 PROCEDURE — 1157F PR ADVANCE CARE PLAN OR EQUIV PRESENT IN MEDICAL RECORD: ICD-10-PCS | Mod: HCNC,CPTII,S$GLB, | Performed by: NURSE PRACTITIONER

## 2023-03-29 PROCEDURE — 1159F MED LIST DOCD IN RCRD: CPT | Mod: HCNC,CPTII,S$GLB, | Performed by: NURSE PRACTITIONER

## 2023-03-29 PROCEDURE — 1159F PR MEDICATION LIST DOCUMENTED IN MEDICAL RECORD: ICD-10-PCS | Mod: HCNC,CPTII,S$GLB, | Performed by: NURSE PRACTITIONER

## 2023-03-29 PROCEDURE — 3074F SYST BP LT 130 MM HG: CPT | Mod: HCNC,CPTII,S$GLB, | Performed by: NURSE PRACTITIONER

## 2023-03-29 PROCEDURE — 3288F FALL RISK ASSESSMENT DOCD: CPT | Mod: HCNC,CPTII,S$GLB, | Performed by: NURSE PRACTITIONER

## 2023-03-29 PROCEDURE — 3078F PR MOST RECENT DIASTOLIC BLOOD PRESSURE < 80 MM HG: ICD-10-PCS | Mod: HCNC,CPTII,S$GLB, | Performed by: NURSE PRACTITIONER

## 2023-03-29 PROCEDURE — 3074F PR MOST RECENT SYSTOLIC BLOOD PRESSURE < 130 MM HG: ICD-10-PCS | Mod: HCNC,CPTII,S$GLB, | Performed by: NURSE PRACTITIONER

## 2023-03-29 NOTE — PROGRESS NOTES
Subjective:       Patient ID: Alexa Otero is a 83 y.o. female.    Chief Complaint: Follow-up    HPI    Admission Date: 3/18/2023  Hospital Length of Stay: 0 days  Discharge Date and Time:  03/21/2023      Patient is an 83-year-old female presents today for hospital follow up. Patient was admitted for Aspiration pneumonia of right lower lobe due to gastric secretions.    3/27/23 Pulmonology-[facility] Follow up in about 1 month (around 4/27/2023).  Head of bed up 4 inches  Continue omeprazole  No caffeine, chocolate, or peppermints in the evenings  Stop antibiotics  Will need to workup  with PFTs after she is over this pneumonia  Past Medical History:   Diagnosis Date    Arthritis     Cataract     Colon polyp     Diabetes mellitus type II 2012    Encounter for blood transfusion     Extra-ovarian endometrioid adenocarcinoma 2020    GERD (gastroesophageal reflux disease)     Hyperlipidemia     Hypertension     Macular degeneration     PONV (postoperative nausea and vomiting)     Squamous cell carcinoma 1980's    precancer of cervix       Review of patient's allergies indicates:  No Known Allergies      Current Outpatient Medications:     benazepriL (LOTENSIN) 40 MG tablet, TAKE 1 TABLET(40 MG) BY MOUTH EVERY DAY, Disp: 90 tablet, Rfl: 2    benzonatate (TESSALON) 100 MG capsule, Take 2 capsules (200 mg total) by mouth 3 (three) times daily as needed for Cough., Disp: 90 capsule, Rfl: 11    gabapentin (NEURONTIN) 100 MG capsule, TAKE 1 CAPSULE(100 MG) BY MOUTH TWICE DAILY, Disp: 180 capsule, Rfl: 3    metFORMIN (GLUCOPHAGE) 500 MG tablet, TAKE 1 TABLET(500 MG) BY MOUTH TWICE DAILY WITH MEALS, Disp: 180 tablet, Rfl: 3    omeprazole (PRILOSEC) 40 MG capsule, TAKE 1 CAPSULE(40 MG) BY MOUTH EVERY DAY, Disp: 90 capsule, Rfl: 3    simvastatin (ZOCOR) 40 MG tablet, TAKE 1 TABLET(40 MG) BY MOUTH EVERY EVENING, Disp: 90 tablet, Rfl: 0    VIT A/VIT C/VIT E/ZINC/COPPER (ICAPS AREDS ORAL), Take 1 capsule by mouth 2 (two) times a day.  ", Disp: , Rfl:     Review of Systems   Constitutional:  Negative for unexpected weight change.   HENT:  Negative for trouble swallowing.    Eyes:  Negative for visual disturbance.   Respiratory:  Negative for shortness of breath.    Cardiovascular:  Negative for chest pain, palpitations and leg swelling.   Gastrointestinal:  Negative for blood in stool.   Genitourinary:  Negative for hematuria.   Skin:  Negative for rash.   Allergic/Immunologic: Negative for immunocompromised state.   Neurological:  Negative for headaches.   Hematological:  Does not bruise/bleed easily.   Psychiatric/Behavioral:  Negative for agitation. The patient is not nervous/anxious.      Objective:      /60 (BP Location: Left arm, Patient Position: Sitting, BP Method: Small (Manual))   Pulse 60   Temp 97.6 °F (36.4 °C) (Oral)   Ht 5' 2" (1.575 m)   Wt 56.2 kg (123 lb 14.4 oz)   SpO2 95%   BMI 22.66 kg/m²   Physical Exam  Constitutional:       Appearance: She is well-developed.   HENT:      Head: Normocephalic.   Cardiovascular:      Rate and Rhythm: Normal rate and regular rhythm.      Heart sounds: Normal heart sounds.   Pulmonary:      Effort: Pulmonary effort is normal.       Musculoskeletal:         General: Normal range of motion.   Skin:     General: Skin is warm and dry.   Neurological:      Mental Status: She is alert and oriented to person, place, and time.   Psychiatric:         Behavior: Behavior normal.         Thought Content: Thought content normal.         Judgment: Judgment normal.       Assessment:       1. Aspiration pneumonia of right lower lobe due to gastric secretions    2. Hypertension associated with diabetes    3. Hyperlipidemia associated with type 2 diabetes mellitus    4. Controlled type 2 diabetes mellitus with stage 3 chronic kidney disease, without long-term current use of insulin    5. BMI 22.0-22.9, adult        Plan:       Aspiration pneumonia of right lower lobe due to gastric secretions  Head of " bed up 4 inches  Continue omeprazole  No caffeine, chocolate, or peppermints in the evenings  Stop antibiotics  Will need to workup  with PFTs after she is over this pneumon  Hypertension associated with diabetes  Stable, continue medication  Low sodium diet  BP Readings from Last 3 Encounters:   03/29/23 118/60   03/27/23 (!) 124/58   03/21/23 137/63        Hyperlipidemia associated with type 2 diabetes mellitus  Stable, on Zocor  Controlled type 2 diabetes mellitus with stage 3 chronic kidney disease, without long-term current use of insulin  Stable, continue management  Follow the ADA diet  Hemoglobin A1C   Date Value Ref Range Status   11/04/2022 5.7 (H) 4.0 - 5.6 % Final     Comment:     ADA Screening Guidelines:  5.7-6.4%  Consistent with prediabetes  >or=6.5%  Consistent with diabetes    High levels of fetal hemoglobin interfere with the HbA1C  assay. Heterozygous hemoglobin variants (HbS, HgC, etc)do  not significantly interfere with this assay.   However, presence of multiple variants may affect accuracy.     04/28/2022 5.8 (H) 4.0 - 5.6 % Final     Comment:     ADA Screening Guidelines:  5.7-6.4%  Consistent with prediabetes  >or=6.5%  Consistent with diabetes    High levels of fetal hemoglobin interfere with the HbA1C  assay. Heterozygous hemoglobin variants (HbS, HgC, etc)do  not significantly interfere with this assay.   However, presence of multiple variants may affect accuracy.     10/29/2021 5.9 (H) 4.0 - 5.6 % Final     Comment:     ADA Screening Guidelines:  5.7-6.4%  Consistent with prediabetes  >or=6.5%  Consistent with diabetes    High levels of fetal hemoglobin interfere with the HbA1C  assay. Heterozygous hemoglobin variants (HbS, HgC, etc)do  not significantly interfere with this assay.   However, presence of multiple variants may affect accuracy.        BMI 22.0-22.9, adult  Stable, continue Ensure supplement      Patient readiness: acceptance and barriers:none    During the course of the  visit the patient was educated and counseled about the following:     Diabetes:  Discussed general issues about diabetes pathophysiology and management.  Addressed ADA diet.  Hypertension:   Dietary sodium restriction.  Regular aerobic exercise.    Goals: Hypertension: Reduce Blood Pressure and Obesity: Reduce calorie intake and BMI    Did patient meet goals/outcomes: Yes    The following self management tools provided: declined    Patient Instructions (the written plan) was given to the patient/family.     Time spent with patient: 15 minutes    Barriers to medications present (no )    Adverse reactions to current medications (no)    Over the counter medications reviewed (Yes)

## 2023-03-31 ENCOUNTER — ANESTHESIA (OUTPATIENT)
Dept: ENDOSCOPY | Facility: HOSPITAL | Age: 83
End: 2023-03-31
Payer: MEDICARE

## 2023-03-31 ENCOUNTER — HOSPITAL ENCOUNTER (OUTPATIENT)
Facility: HOSPITAL | Age: 83
Discharge: HOME | End: 2023-03-31
Attending: INTERNAL MEDICINE | Admitting: INTERNAL MEDICINE
Payer: MEDICARE

## 2023-03-31 ENCOUNTER — ANESTHESIA EVENT (OUTPATIENT)
Dept: ENDOSCOPY | Facility: HOSPITAL | Age: 83
End: 2023-03-31
Payer: MEDICARE

## 2023-03-31 DIAGNOSIS — K44.9 HIATAL HERNIA: ICD-10-CM

## 2023-03-31 DIAGNOSIS — K29.70 GASTRITIS, PRESENCE OF BLEEDING UNSPECIFIED, UNSPECIFIED CHRONICITY, UNSPECIFIED GASTRITIS TYPE: Primary | ICD-10-CM

## 2023-03-31 DIAGNOSIS — K22.719 BARRETT'S ESOPHAGUS WITH DYSPLASIA: ICD-10-CM

## 2023-03-31 DIAGNOSIS — K31.7 GASTRIC POLYPS: ICD-10-CM

## 2023-03-31 PROCEDURE — 27201089 HC SNARE, DISP (ANY): Mod: HCNC | Performed by: INTERNAL MEDICINE

## 2023-03-31 PROCEDURE — 37000008 HC ANESTHESIA 1ST 15 MINUTES: Mod: HCNC | Performed by: INTERNAL MEDICINE

## 2023-03-31 PROCEDURE — D9220A PRA ANESTHESIA: Mod: HCNC,ANES,, | Performed by: ANESTHESIOLOGY

## 2023-03-31 PROCEDURE — 88305 TISSUE EXAM BY PATHOLOGIST: CPT | Mod: 59,HCNC | Performed by: PATHOLOGY

## 2023-03-31 PROCEDURE — 43239 EGD BIOPSY SINGLE/MULTIPLE: CPT | Mod: 59,HCNC,, | Performed by: INTERNAL MEDICINE

## 2023-03-31 PROCEDURE — 25000003 PHARM REV CODE 250: Mod: HCNC | Performed by: STUDENT IN AN ORGANIZED HEALTH CARE EDUCATION/TRAINING PROGRAM

## 2023-03-31 PROCEDURE — 43239 PR EGD, FLEX, W/BIOPSY, SGL/MULTI: ICD-10-PCS | Mod: 59,HCNC,, | Performed by: INTERNAL MEDICINE

## 2023-03-31 PROCEDURE — 88305 TISSUE EXAM BY PATHOLOGIST: ICD-10-PCS | Mod: 26,HCNC,, | Performed by: PATHOLOGY

## 2023-03-31 PROCEDURE — 25000003 PHARM REV CODE 250: Mod: HCNC | Performed by: INTERNAL MEDICINE

## 2023-03-31 PROCEDURE — D9220A PRA ANESTHESIA: Mod: HCNC,CRNA,, | Performed by: STUDENT IN AN ORGANIZED HEALTH CARE EDUCATION/TRAINING PROGRAM

## 2023-03-31 PROCEDURE — 43239 EGD BIOPSY SINGLE/MULTIPLE: CPT | Mod: 59,HCNC | Performed by: INTERNAL MEDICINE

## 2023-03-31 PROCEDURE — 27201012 HC FORCEPS, HOT/COLD, DISP: Mod: HCNC | Performed by: INTERNAL MEDICINE

## 2023-03-31 PROCEDURE — 43251 EGD REMOVE LESION SNARE: CPT | Mod: HCNC | Performed by: INTERNAL MEDICINE

## 2023-03-31 PROCEDURE — 43251 EGD REMOVE LESION SNARE: CPT | Mod: HCNC,,, | Performed by: INTERNAL MEDICINE

## 2023-03-31 PROCEDURE — 63600175 PHARM REV CODE 636 W HCPCS: Mod: HCNC | Performed by: STUDENT IN AN ORGANIZED HEALTH CARE EDUCATION/TRAINING PROGRAM

## 2023-03-31 PROCEDURE — D9220A PRA ANESTHESIA: ICD-10-PCS | Mod: HCNC,ANES,, | Performed by: ANESTHESIOLOGY

## 2023-03-31 PROCEDURE — 43251 PR EGD, FLEX, W/REMOVAL, TUMOR/POLYP/LESION(S), SNARE: ICD-10-PCS | Mod: HCNC,,, | Performed by: INTERNAL MEDICINE

## 2023-03-31 PROCEDURE — D9220A PRA ANESTHESIA: ICD-10-PCS | Mod: HCNC,CRNA,, | Performed by: STUDENT IN AN ORGANIZED HEALTH CARE EDUCATION/TRAINING PROGRAM

## 2023-03-31 PROCEDURE — 88305 TISSUE EXAM BY PATHOLOGIST: CPT | Mod: 26,HCNC,, | Performed by: PATHOLOGY

## 2023-03-31 RX ORDER — LIDOCAINE HYDROCHLORIDE 20 MG/ML
INJECTION INTRAVENOUS
Status: DISCONTINUED | OUTPATIENT
Start: 2023-03-31 | End: 2023-03-31

## 2023-03-31 RX ORDER — SODIUM CHLORIDE 9 MG/ML
INJECTION, SOLUTION INTRAVENOUS CONTINUOUS
Status: DISCONTINUED | OUTPATIENT
Start: 2023-03-31 | End: 2023-03-31 | Stop reason: HOSPADM

## 2023-03-31 RX ORDER — PROPOFOL 10 MG/ML
VIAL (ML) INTRAVENOUS
Status: DISCONTINUED | OUTPATIENT
Start: 2023-03-31 | End: 2023-03-31

## 2023-03-31 RX ADMIN — SODIUM CHLORIDE: 9 INJECTION, SOLUTION INTRAVENOUS at 08:03

## 2023-03-31 RX ADMIN — LIDOCAINE HYDROCHLORIDE 100 MG: 20 INJECTION, SOLUTION INTRAVENOUS at 08:03

## 2023-03-31 RX ADMIN — PROPOFOL 50 MG: 10 INJECTION, EMULSION INTRAVENOUS at 08:03

## 2023-03-31 RX ADMIN — PROPOFOL 100 MG: 10 INJECTION, EMULSION INTRAVENOUS at 08:03

## 2023-03-31 RX ADMIN — PROPOFOL 20 MG: 10 INJECTION, EMULSION INTRAVENOUS at 08:03

## 2023-03-31 NOTE — ANESTHESIA PREPROCEDURE EVALUATION
03/31/2023  Alexa Otero is a 83 y.o., female.    Pre-op Assessment    I have reviewed the Patient Summary Reports.    I have reviewed the Nursing Notes. I have reviewed the NPO Status.   I have reviewed the Medications.     Review of Systems  Anesthesia Hx:  Denies Family Hx of Anesthesia complications.   Denies Personal Hx of Anesthesia complications.   Cardiovascular:   Hypertension    Hepatic/GI:   GERD    Musculoskeletal:   Arthritis     Endocrine:   Diabetes, type 2        Physical Exam  General:  Well nourished, Cooperative, Alert and Oriented      Airway/Jaw/Neck:  Airway Findings: Mouth Opening: Normal   Tongue: Normal   General Airway Assessment: Adult Oropharynx Findings: Normal Mallampati: III  Improves to II with phonation.  TM Distance: Normal, at least 6 cm   Neck Findings: Normal     Dental:  Intact     Chest/Lungs:  Chest/Lungs Findings: Clear to auscultation, Normal Respiratory Rate      Heart/Vascular:  Heart Findings: Rate: Normal  Rhythm: Regular Rhythm  Sounds: Normal  Heart murmur: negative    Abdomen:  Abdomen Findings:     Musculoskeletal:  Musculoskeletal Findings:    Skin:  Skin Findings:          Anesthesia Plan  Type of Anesthesia, risks & benefits discussed:  Anesthesia Type:  Gen Natural Airway    Patient's Preference:   Plan Factors:          Intra-op Monitoring Plan: standard ASA monitors  Intra-op Monitoring Plan Comments:   Post Op Pain Control Plan:   Post Op Pain Control Plan Comments:     Induction:   IV  Beta Blocker:  Patient is not currently on a Beta-Blocker (No further documentation required).       Informed Consent: Informed consent signed with the Patient and all parties understand the risks and agree with anesthesia plan.  All questions answered.  Anesthesia consent signed with patient.  ASA Score: 2     Day of Surgery Review of History & Physical:               Ready For Surgery From Anesthesia Perspective.           Physical Exam  General: Well nourished, Cooperative, Alert and Oriented    Airway:  Mallampati: III / II  Mouth Opening: Normal  TM Distance: Normal, at least 6 cm  Tongue: Normal    Dental:  Intact    Chest/Lungs:  Clear to auscultation, Normal Respiratory Rate    Heart:  Rate: Normal  Rhythm: Regular Rhythm  Sounds: Normal          Anesthesia Plan  Type of Anesthesia, risks & benefits discussed:    Anesthesia Type: Gen Natural Airway  Intra-op Monitoring Plan: standard ASA monitors  Induction:  IV  Informed Consent: Informed consent signed with the Patient and all parties understand the risks and agree with anesthesia plan.  All questions answered.   ASA Score: 2    Ready For Surgery From Anesthesia Perspective.       .

## 2023-03-31 NOTE — H&P (VIEW-ONLY)
CC: Kwon's esophagus    83 year old female with above. States that symptoms are absent, no alleviating/exacerbating factors. No bleeding or weight loss.     ROS:  No headache, no fever/chills, no chest pain/SOB, no nausea/vomiting/diarrhea/constipation/GI bleeding/abdominal pain, no dysuria/hematuria.    VSSAF   Exam:   Alert and oriented x 3; no apparent distress   PERRLA, sclera anicteric  CV: Regular rate/rhythm, normal PMI   Lungs: Clear bilaterally with no wheeze/rales   Abdomen: Soft, NT/ND, normal bowel sounds   Ext: No cyanosis, clubbing     Impression:   As above    Plan:   Proceed with endoscopy. Further recs to follow.

## 2023-03-31 NOTE — PROVATION PATIENT INSTRUCTIONS
Discharge Summary/Instructions after an Endoscopic Procedure  Patient Name: Alexa Otero  Patient MRN: [id number]  Patient YOB: 1940 Friday, March 31, 2023  Sabino Dasilva MD  Dear patient,  As a result of recent federal legislation (The Federal Cures Act), you may   receive lab or pathology results from your procedure in your [facility]   account before your physician is able to contact you. Your physician or   their representative will relay the results to you with their   recommendations at their soonest availability.  Thank you,  RESTRICTIONS:  During your procedure today, you received medications for sedation.  These   medications may affect your judgment, balance and coordination.  Therefore,   for 24 hours, you have the following restrictions:   - DO NOT drive a car, operate machinery, make legal/financial decisions,   sign important papers or drink alcohol.    ACTIVITY:  Today: no heavy lifting, straining or running due to procedural   sedation/anesthesia.  The following day: return to full activity including work.  DIET:  Eat and drink normally unless instructed otherwise.     TREATMENT FOR COMMON SIDE EFFECTS:  - Mild abdominal pain, nausea, belching, bloating or excessive gas:  rest,   eat lightly and use a heating pad.  - Sore Throat: treat with throat lozenges and/or gargle with warm salt   water.  - Because air was used during the procedure, expelling large amounts of air   from your rectum or belching is normal.  - If a bowel prep was taken, you may not have a bowel movement for 1-3 days.    This is normal.  SYMPTOMS TO WATCH FOR AND REPORT TO YOUR PHYSICIAN:  1. Abdominal pain or bloating, other than gas cramps.  2. Chest pain.  3. Back pain.  4. Signs of infection such as: chills or fever occurring within 24 hours   after the procedure.  5. Rectal bleeding, which would show as bright red, maroon, or black stools.   (A tablespoon of blood from the rectum is not serious, especially if    hemorrhoids are present.)  6. Vomiting.  7. Weakness or dizziness.  GO DIRECTLY TO THE NEAREST EMERGENCY ROOM IF YOU HAVE ANY OF THE FOLLOWING:      Difficulty breathing              Chills and/or fever over 101 F   Persistent vomiting and/or vomiting blood   Severe abdominal pain   Severe chest pain   Black, tarry stools   Bleeding- more than one tablespoon   Any other symptom or condition that you feel may need urgent attention  Your doctor recommends these additional instructions:  If any biopsies were taken, your doctors clinic will contact you in 1 to 2   weeks with any results.  - Patient has a contact number available for emergencies.  The signs and   symptoms of potential delayed complications were discussed with the   patient.  Return to normal activities tomorrow.  Written discharge   instructions were provided to the patient.   - Resume previous diet.   - Continue present medications.   - No aspirin, ibuprofen, naproxen, or other non-steroidal anti-inflammatory   drugs.   - Await pathology results.   - Discharge patient to home (ambulatory).   - Follow an antireflux regimen.   - Return to GI office after studies are complete.  For questions, problems or results please call your physician - Sabino Dasilva MD at Work:  [phone].  [facility], EMERGENCY ROOM PHONE NUMBER: [phone]  IF A COMPLICATION OR EMERGENCY SITUATION ARISES AND YOU ARE UNABLE TO REACH   YOUR PHYSICIAN - GO DIRECTLY TO THE EMERGENCY ROOM.  Sabino Dasilva MD  3/31/2023 8:59:27 AM  This report has been verified and signed electronically.  Dear patient,  As a result of recent federal legislation (The Federal Cures Act), you may   receive lab or pathology results from your procedure in your [facility]   account before your physician is able to contact you. Your physician or   their representative will relay the results to you with their   recommendations at their soonest availability.  Thank you,  PROVATION

## 2023-03-31 NOTE — TRANSFER OF CARE
"Anesthesia Transfer of Care Note    Patient: Alexa Otero    Procedure(s) Performed: Procedure(s) (LRB):  EGD (ESOPHAGOGASTRODUODENOSCOPY) (N/A)    Patient location: GI    Anesthesia Type: general    Transport from OR: Transported from OR on room air with adequate spontaneous ventilation    Post pain: adequate analgesia    Post assessment: no apparent anesthetic complications    Post vital signs: stable    Level of consciousness: lethargic and responds to stimulation    Nausea/Vomiting: no nausea/vomiting    Complications: none    Transfer of care protocol was followed      Last vitals:   Visit Vitals  BP (!) 106/55   Pulse 99   Temp 36.9 °C (98.4 °F) (Skin)   Resp 17   Ht 5' 3" (1.6 m)   Wt 55.8 kg (123 lb)   SpO2 100%   Breastfeeding No   BMI 21.79 kg/m²     "

## 2023-03-31 NOTE — ANESTHESIA POSTPROCEDURE EVALUATION
Anesthesia Post Evaluation    Patient: Alexa Otero    Procedure(s) Performed: Procedure(s) (LRB):  EGD (ESOPHAGOGASTRODUODENOSCOPY) (N/A)    Final Anesthesia Type: general      Patient location during evaluation: PACU  Patient participation: Yes- Able to Participate  Level of consciousness: awake and alert and oriented  Post-procedure vital signs: reviewed and stable  Pain management: adequate  Airway patency: patent    PONV status at discharge: No PONV  Anesthetic complications: no      Cardiovascular status: blood pressure returned to baseline  Respiratory status: unassisted, spontaneous ventilation and room air  Hydration status: euvolemic  Follow-up not needed.          Vitals Value Taken Time   /58 03/31/23 0922   Temp 36.7 °C (98.1 °F) 03/31/23 0904   Pulse 84 03/31/23 0924   Resp 16 03/31/23 0904   SpO2 98 % 03/31/23 0924   Vitals shown include unvalidated device data.      No case tracking events are documented in the log.      Pain/Jess Score: Jess Score: 10 (3/31/2023  9:17 AM)

## 2023-03-31 NOTE — OR NURSING
Tram RN in recovery found a debit card in the post op area.  The pt was sitting in a chair and her debit card was found by Tram and she brought it to me.  I called the patient family member Porter (Spouse) and he pulled up in the outpt area and I gave him the card.  He stated this was not the first time.

## 2023-04-03 VITALS
WEIGHT: 123 LBS | TEMPERATURE: 98 F | RESPIRATION RATE: 16 BRPM | HEART RATE: 84 BPM | DIASTOLIC BLOOD PRESSURE: 58 MMHG | SYSTOLIC BLOOD PRESSURE: 117 MMHG | HEIGHT: 63 IN | OXYGEN SATURATION: 98 % | BODY MASS INDEX: 21.79 KG/M2

## 2023-04-03 NOTE — PROGRESS NOTES
Very pleased with care given.  All staff and MD were excellent.   Denies pain or discomfort from EGD.  Still having frequent diarrhea,  Instructed her to call MD office.  Understands no NSAIDS. States she understands discharge instructions.

## 2023-04-06 LAB
FINAL PATHOLOGIC DIAGNOSIS: NORMAL
GROSS: NORMAL
Lab: NORMAL

## 2023-04-11 ENCOUNTER — TELEPHONE (OUTPATIENT)
Dept: PAIN MEDICINE | Facility: CLINIC | Age: 83
End: 2023-04-11
Payer: MEDICARE

## 2023-04-11 DIAGNOSIS — M47.896 OTHER SPONDYLOSIS, LUMBAR REGION: Primary | ICD-10-CM

## 2023-04-13 ENCOUNTER — OFFICE VISIT (OUTPATIENT)
Dept: PULMONOLOGY | Facility: CLINIC | Age: 83
End: 2023-04-13
Payer: MEDICARE

## 2023-04-13 VITALS
OXYGEN SATURATION: 96 % | SYSTOLIC BLOOD PRESSURE: 121 MMHG | HEART RATE: 92 BPM | DIASTOLIC BLOOD PRESSURE: 71 MMHG | HEIGHT: 63 IN | WEIGHT: 123.56 LBS | BODY MASS INDEX: 21.89 KG/M2

## 2023-04-13 DIAGNOSIS — R91.1 SOLITARY PULMONARY NODULE: Primary | ICD-10-CM

## 2023-04-13 DIAGNOSIS — J18.9 PNEUMONIA OF RIGHT LOWER LOBE DUE TO INFECTIOUS ORGANISM: ICD-10-CM

## 2023-04-13 PROCEDURE — 99999 PR PBB SHADOW E&M-EST. PATIENT-LVL IV: ICD-10-PCS | Mod: PBBFAC,HCNC,, | Performed by: INTERNAL MEDICINE

## 2023-04-13 PROCEDURE — 3078F PR MOST RECENT DIASTOLIC BLOOD PRESSURE < 80 MM HG: ICD-10-PCS | Mod: HCNC,CPTII,S$GLB, | Performed by: INTERNAL MEDICINE

## 2023-04-13 PROCEDURE — 1126F PR PAIN SEVERITY QUANTIFIED, NO PAIN PRESENT: ICD-10-PCS | Mod: HCNC,CPTII,S$GLB, | Performed by: INTERNAL MEDICINE

## 2023-04-13 PROCEDURE — 99214 OFFICE O/P EST MOD 30 MIN: CPT | Mod: HCNC,S$GLB,, | Performed by: INTERNAL MEDICINE

## 2023-04-13 PROCEDURE — 1126F AMNT PAIN NOTED NONE PRSNT: CPT | Mod: HCNC,CPTII,S$GLB, | Performed by: INTERNAL MEDICINE

## 2023-04-13 PROCEDURE — 3288F PR FALLS RISK ASSESSMENT DOCUMENTED: ICD-10-PCS | Mod: HCNC,CPTII,S$GLB, | Performed by: INTERNAL MEDICINE

## 2023-04-13 PROCEDURE — 1101F PR PT FALLS ASSESS DOC 0-1 FALLS W/OUT INJ PAST YR: ICD-10-PCS | Mod: HCNC,CPTII,S$GLB, | Performed by: INTERNAL MEDICINE

## 2023-04-13 PROCEDURE — 3074F PR MOST RECENT SYSTOLIC BLOOD PRESSURE < 130 MM HG: ICD-10-PCS | Mod: HCNC,CPTII,S$GLB, | Performed by: INTERNAL MEDICINE

## 2023-04-13 PROCEDURE — 1159F MED LIST DOCD IN RCRD: CPT | Mod: HCNC,CPTII,S$GLB, | Performed by: INTERNAL MEDICINE

## 2023-04-13 PROCEDURE — 3078F DIAST BP <80 MM HG: CPT | Mod: HCNC,CPTII,S$GLB, | Performed by: INTERNAL MEDICINE

## 2023-04-13 PROCEDURE — 1159F PR MEDICATION LIST DOCUMENTED IN MEDICAL RECORD: ICD-10-PCS | Mod: HCNC,CPTII,S$GLB, | Performed by: INTERNAL MEDICINE

## 2023-04-13 PROCEDURE — 3074F SYST BP LT 130 MM HG: CPT | Mod: HCNC,CPTII,S$GLB, | Performed by: INTERNAL MEDICINE

## 2023-04-13 PROCEDURE — 99999 PR PBB SHADOW E&M-EST. PATIENT-LVL IV: CPT | Mod: PBBFAC,HCNC,, | Performed by: INTERNAL MEDICINE

## 2023-04-13 PROCEDURE — 1157F ADVNC CARE PLAN IN RCRD: CPT | Mod: HCNC,CPTII,S$GLB, | Performed by: INTERNAL MEDICINE

## 2023-04-13 PROCEDURE — 1101F PT FALLS ASSESS-DOCD LE1/YR: CPT | Mod: HCNC,CPTII,S$GLB, | Performed by: INTERNAL MEDICINE

## 2023-04-13 PROCEDURE — 1157F PR ADVANCE CARE PLAN OR EQUIV PRESENT IN MEDICAL RECORD: ICD-10-PCS | Mod: HCNC,CPTII,S$GLB, | Performed by: INTERNAL MEDICINE

## 2023-04-13 PROCEDURE — 3288F FALL RISK ASSESSMENT DOCD: CPT | Mod: HCNC,CPTII,S$GLB, | Performed by: INTERNAL MEDICINE

## 2023-04-13 PROCEDURE — 99214 PR OFFICE/OUTPT VISIT, EST, LEVL IV, 30-39 MIN: ICD-10-PCS | Mod: HCNC,S$GLB,, | Performed by: INTERNAL MEDICINE

## 2023-04-13 NOTE — PROGRESS NOTES
Pulmonary Clinic New Patient    HISTORY OF PRESENT ILLNESS   Alexa Otero is a 83 y.o. female with a PMH of ovarian cancer s/p hysterectomy and oophorectomy and chemo 2 years ago, Kwon's esophagus, HTN, and DM2 on whom we have been consulted for recurrent pneumonia.    The patient was admitted 3/18-3/21/23 with nausea and vomiting, as well as diarrhea. Her chest CT showed RLL opacities similar to 2 months prior, when she was treated for pneumonia. During her March admission, sputum culture showed normal cece, cytology was negative for malignant cells, and Legionella Ag was negative. In the hospital, Ms. Otero was evaluated by SLP who noted no abnormality of swallowing.    Shortly after hospital discharge, the patient underwent EGD with findings of small hiatal hernia and no dysplasia or metaplasia on pathology.    The patient reports she has had a chronic cough for about 2 years productive of clear sputum. In the past week it has become yellow. She was treated for pneumonia in December.    She often wakes up in the middle of the night to eat to ease her stomach pain, and she sleeps lying on her right side. Now sleeping with head elevated. Of note, she has a history of Kwon's esophagus requiring ablation in the past, but she denies any sensation of acid reflux ever. She takes omeprazole daily.    She has a sensation of post-nasal drip. No known allergies.    SMOKING HISTORY  Quit at age 41. Smoked 1 PPD for 25 years.    REVIEW OF SYSTEMS  GENERAL: Feeling well. No fevers, chills, or night sweats.  EYES: Vision is good.  ENT: No sinusitis or pharyngitis.   HEART: No chest pain or palpitations.  LUNGS: No cough, sputum, or wheezing.  GI: No abdominal pain, nausea, vomiting, or diarrhea.  : No dysuria, urgency, or frequency.  SKIN: No lesions or rashes.  MUSCULOSKELETAL: No joint pain or myalgias.  NEURO: No headaches or neuropathy.  LYMPH: No edema or adenopathy.  PSYCH: No anxiety or depression.  ENDO: She  has lost 12-15 in the last 3 months unintentionally.    PFSH:  Past Medical History:   Diagnosis Date    Arthritis     Cataract     Colon polyp     Diabetes mellitus type II 2012    Encounter for blood transfusion     Extra-ovarian endometrioid adenocarcinoma 2020    GERD (gastroesophageal reflux disease)     Hyperlipidemia     Hypertension     Macular degeneration     PONV (postoperative nausea and vomiting)     Squamous cell carcinoma 1980's    precancer of cervix         Past Surgical History:   Procedure Laterality Date    AUGMENTATION OF BREAST      CHOLECYSTECTOMY      COLONOSCOPY      ESOPHAGOGASTRODUODENOSCOPY N/A 06/20/2018    Procedure: EGD (ESOPHAGOGASTRODUODENOSCOPY);  Surgeon: Sabino Stephenson MD;  Location: [facility] ENDO;  Service: Endoscopy;  Laterality: N/A;    ESOPHAGOGASTRODUODENOSCOPY N/A 3/31/2023    Procedure: EGD (ESOPHAGOGASTRODUODENOSCOPY);  Surgeon: Sabino Stephenson MD;  Location: [facility] ENDO;  Service: Endoscopy;  Laterality: N/A;    HYSTERECTOMY      partial    INJECTION, SACROILIAC JOINT Right 1/26/2023    Procedure: INJECTION,SACROILIAC JOINT;  Surgeon: Agustin Robles MD;  Location: [facility] OR;  Service: Pain Management;  Laterality: Right;    INSERTION OF TUNNELED CENTRAL VENOUS CATHETER (CVC) WITH SUBCUTANEOUS PORT Right 10/19/2020    Procedure: INSERTION, PORT-A-CATH;  Surgeon: Misti Mcfarland MD;  Location: [facility] OR;  Service: General;  Laterality: Right;    INTRAMEDULLARY RODDING OF TROCHANTER OF FEMUR Right 11/28/2021    Procedure: INSERTION, INTRAMEDULLARY LUC, FEMUR, TROCHANTER/RIGHT TFN DR FAJARDO NOTIFIED REP;  Surgeon: Kp Fajardo MD;  Location: [facility] OR;  Service: Orthopedics;  Laterality: Right;  SKIP    ROBOT-ASSISTED LAPAROSCOPIC LYMPHADENECTOMY USING DA NELLA XI N/A 09/21/2020    Procedure: XI ROBOTIC LYMPHADENECTOMY-pelvic and kell-aortic;  Surgeon: Altagracia Ray MD;  Location: [facility] OR;  Service: OB/GYN;  Laterality: N/A;    ROBOT-ASSISTED LAPAROSCOPIC OMENTECTOMY  USING DA NELLA XI N/A 09/21/2020    Procedure: XI ROBOTIC OMENTECTOMY;  Surgeon: Altagracia Ray MD;  Location: [facility] OR;  Service: OB/GYN;  Laterality: N/A;    ROBOT-ASSISTED LAPAROSCOPIC SALPINGO-OOPHORECTOMY USING DA NELLA XI Bilateral 09/21/2020    Procedure: XI ROBOTIC SALPINGO-OOPHORECTOMY;  Surgeon: Altagracia Ray MD;  Location: [facility] OR;  Service: OB/GYN;  Laterality: Bilateral;    UPPER GASTROINTESTINAL ENDOSCOPY       Social History     Tobacco Use    Smoking status: Former     Packs/day: 1.00     Years: 20.00     Pack years: 20.00     Types: Cigarettes    Smokeless tobacco: Never    Tobacco comments:     quit 40 yrs ago   Substance Use Topics    Alcohol use: Yes     Alcohol/week: 1.0 standard drink     Types: 1 Glasses of wine per week     Comment: occasional    Drug use: No     Family History   Problem Relation Age of Onset    Cancer Mother 57        lung    Cancer Father 56        oral    Skin cancer Sister     Skin cancer Brother     Melanoma Brother     Skin cancer Brother     Breast cancer Maternal Grandmother     Breast cancer Paternal Grandmother     Colon polyps Daughter     Psoriasis Neg Hx     Lupus Neg Hx     Eczema Neg Hx     Colon cancer Neg Hx     Crohn's disease Neg Hx     Esophageal cancer Neg Hx     Stomach cancer Neg Hx     Ulcerative colitis Neg Hx      Review of patient's allergies indicates:  No Known Allergies      VITAL SIGNS (MOST RECENT)  Pulse: 92 (04/13/23 1425)  BP: 121/71 (04/13/23 1425)  SpO2: 96 % (On room air at rest.) (04/13/23 1425)    PHYSICAL EXAM  GENERAL: NAD.  HEENT: Pupils equal and reactive. Extraocular movements intact.   NECK: Supple.   HEART: Regular rate and rhythm. No murmur or gallop auscultated.  LUNGS: Clear to auscultation and percussion. Lung excursion symmetrical.  ABDOMEN: Bowel sounds present. Non-tender, no masses palpated.  EXTREMITIES: Normal muscle tone and joint movement, no cyanosis or clubbing.   LYMPHATICS: No adenopathy palpated, no  edema.  SKIN: Dry, intact, no lesions.   NEURO: No gross cognitive or motor deficits.  PSYCH: Appropriate affect.    Lab Results   Component Value Date    WBC 5.73 03/21/2023    HGB 9.2 (L) 03/21/2023    HCT 29.1 (L) 03/21/2023     03/21/2023    CHOL 177 04/28/2022    TRIG 99 04/28/2022    HDL 49 04/28/2022    ALT 12 03/21/2023    AST 10 03/21/2023     03/21/2023    K 4.0 03/21/2023     03/21/2023    CREATININE 0.9 03/21/2023    BUN 13 03/21/2023    CO2 28 03/21/2023    TSH 0.680 03/18/2023    INR 1.2 03/18/2023    GLUF 106 10/16/2015    HGBA1C 5.7 (H) 11/04/2022       LAST ARTERIAL BLOOD GAS  @LABRCNTIP[PH,PO2,PCO2,HCO3,BE@      LAST 7 DAYS MICROBIOLOGY   Microbiology Results (last 7 days)       ** No results found for the last 168 hours. **            MOST RECENT IMAGING  X-Ray Chest AP Portable  HISTORY: vomiting    COMPARISON:March 3, 2023    FINDINGS:Right subclavian infusion catheter is noted with tip projecting over the entry point the superior vena cava. Calcified breast implants again projecting over the lower chest. Lungs are well expanded and free of active disease. Mild bibasilar atelectasis/infiltrate more prominent towards the right with improvement.. Pulmonary vascularity is without evidence of engorgement. The thoracic spine mild convexity towards the right.    IMPRESSION:  1. No evidence of active process.  2. Right basilar atelectasis/infiltrate with nominal improvement.    Electronically signed by:  Anton Guzman MD  3/19/2023 6:18 AM CDT Workstation: [id number]  CT Abdomen Pelvis With Contrast  EXAM DESCRIPTION: CT ABDOMEN PELVIS WITH CONTRAST 3/19/2023 12:21 AM CDT    CLINICAL HISTORY: 83 years, Female, Epigastric pain    COMPARISON: None    PROCEDURE:    Contrast-enhanced images of the abdomen and pelvis were performed utilizing 2 mm slice thickness at 2 mm interval reconstruction from the lung bases to the ischial tuberosities after the administration of IV contrast.  In  addition multiplanar reformats in the coronal and sagittal plane were obtained and reviewed.  This exam was performed according to our departmental dose-optimization protocol, which includes automated exposure control, adjustment of the mA and/or kV according to patient size and/or use of iterative reconstruction technique.    FINDINGS:    Evaluation of the chest will give superior report    The liver demonstrated presence of subcapsular right hepatic lobe cyst measuring 4.1 x 2.2 cm on image 76. There is a status post cholecystomy. Radiodensity within the peritoneum, anterior to the pancreatic head could correspond perhaps to dystrophic rim calcification/or large calculus from previous cholecystectomy.  The pancreas, spleen and adrenal glands demonstrate to be unremarkable, no focal lesions are noted.  The kidneys demonstrate normal uptake of contrast media. No evidence for nephrolithiasis and/or hydronephrosis. Small bilateral simple renal cysts too small to characterize by CT criteria. No follow-up is recommended.  Grossly the unopacified stomach, small bowel and large bowel demonstrate to be within normal limits.  There is no evidence for bowel dilatation/or free air.  The appendix is normal. The left site colon is decompressed. There is diverticulosis within the sigmoid colon.  The urinary bladder demonstrate to be unremarkable.  The uterus is absent. There are no adnexal masses.  The aorta demonstrate atherosclerotic disease extending into the aortic location.  There is no retroperitoneal lymphadenopathy. There is no evidence for ascites/or significant abnormal fluid collections..  The bone windows demonstrate diffuse bony osteopenia. Degenerative disc disease at L1-L3. ORIF right hip joint.    IMPRESSION:  No acute intra-abdominal process.    Status post cholecystectomy and hysterectomy.    Hepatic cyst.    Atherosclerotic disease of the aorta.    Sigmoid diverticulosis without evidence of acute  diverticulitis.    Degenerative disc disease at L1-L3.    Electronically signed by:  Agustin Fontana MD  3/19/2023 12:26 AM CDT Workstation: [id number]  CTA Chest Non-Coronary (PE Studies)  EXAM DESCRIPTION: CTA CHEST NON CORONARY (PE STUDIES) 3/19/2023 12:15 AM CDT    CLINICAL HISTORY: 83 years, Female, Pulmonary embolism (PE) suspected, high prob    COMPARISON: None    PROCEDURE:  Multiple transaxial tomograms of the chest were obtained from the lung apices through the lung bases utilizing 1 mm slice thickness at 1 mm interval reconstruction after the administration of 100 cc of Omnipaque 350 for complete opacification of the pulmonary arteries.  Subsequent 3-D maximum intensity projection images were generated in the coronal and sagittal plane for review.  This exam was performed according to our departmental dose-optimization protocol, which includes automated exposure control, adjustment of the mA and/or kV according to patient size and/or use of iterative reconstruction technique.    FINDINGS:  The lungs parenchyma demonstrate the presence of centrilobular and paraseptal emphysematous changes. There is a focal area of airspace opacity/consolidation involving the posterior segment right lower lobe no significantly changed when compared to prior study. No significant masses, nodules and/or consolidations are identified.  The trachea mainstem bronchus demonstrate to be normal. There is bronchial some of the right lower lobe demonstrate to be patent. There is no significant pericardial or pleural effusions.  The thoracic aorta demonstrate intimal aortic arch ossification. No evidence for thoracic aortic aneurysm/or dissection. The heart is normal in size. There are coronary artery calcification. Minimal aortic valvular calcification. No evidence for right ventricular strain.  There are prominent right hilar lymph node measuring 1.1 cm on image 116, 1.1 cm on image 119, right infrahilar area measuring 1.1 cm on image  143 and subcarinal region measuring 1.2 cm on image 123. The axillary regions demonstrate to be clear.  Pulmonary arteries demonstrate to be normal, no intraluminal defect are seen that would suggest pulmonary embolus.  The bone windows demonstrate demonstrate minimal anterior spondylosis lower thoracic spine. Inferior deformity with minimal increase sclerosis at T11 perhaps suggesting old compression deformity.  There are bilateral breast implants with rim wall calcification.  Evaluation of the abdomen and pelvis will be given in separate report.    IMPRESSION:  No CT evidence for pulmonary embolism.    Focal area of airspace opacity/consolidation involving the posterior segment right lower lobe no significantly changed when compared to prior study.    Emphysematous changes.    Prominent right hilar and subcarinal lymph nodes, may be reactive in nature.    Inferior deformity with minimal increase sclerosis at T11 perhaps suggesting old compression deformity.    Electronically signed by:  Agustin Fontana MD  3/19/2023 12:21 AM CDT Workstation: [id number]      CURRENT VISIT EKG  No results found for this visit on 04/13/23.    ECHOCARDIOGRAM RESULTS  No results found for this or any previous visit.      Pulmonary Function Tests  No results found for: FEV1, FVC, NPI5KOT, TLC, DLCO    I have personally reviewed recent labs and ABGs, and I have viewed the images of recent chest x-rays and chest CTs.    ASSESSMENT & PLAN   Alexa Otero is a 83 y.o. female with a PMH of ovarian cancer s/p hysterectomy and oophorectomy and chemo 2 years ago, Kwon's esophagus, HTN, and DM2 on whom we have been consulted for recurrent pneumonia.    #Aspiration pneumonia  #Chronic cough  #Subacute and chronic CT abnormalities of RLL  Likely recurrent aspiration and possibly a current pneumonia.  #Post-nasal drip  - start Flonase  - continue sleeping at incline  - continue PPI  - follow with GI  - repeat CT chest in lat e  [place]    #Unexpected weight loss  Low suspicion for pulmonary malignancy.  - scheduled for colonoscopy  - continue follow up with PCP    #Emphysema  Asymptomatic.  - no further workup or treatment at this time    No follow-ups on file.  Follow up in July 2023.    Skip Tariq MD  Pulmonary and Critical Care Medicine  [facility]  Date of Service: 04/13/2023  2:37 PM

## 2023-04-13 NOTE — H&P (VIEW-ONLY)
Pulmonary Clinic New Patient    HISTORY OF PRESENT ILLNESS   Alexa Otero is a 83 y.o. female with a PMH of ovarian cancer s/p hysterectomy and oophorectomy and chemo 2 years ago, Kwon's esophagus, HTN, and DM2 on whom we have been consulted for recurrent pneumonia.    The patient was admitted 3/18-3/21/23 with nausea and vomiting, as well as diarrhea. Her chest CT showed RLL opacities similar to 2 months prior, when she was treated for pneumonia. During her March admission, sputum culture showed normal cece, cytology was negative for malignant cells, and Legionella Ag was negative. In the hospital, Ms. Otero was evaluated by SLP who noted no abnormality of swallowing.    Shortly after hospital discharge, the patient underwent EGD with findings of small hiatal hernia and no dysplasia or metaplasia on pathology.    The patient reports she has had a chronic cough for about 2 years productive of clear sputum. In the past week it has become yellow. She was treated for pneumonia in December.    She often wakes up in the middle of the night to eat to ease her stomach pain, and she sleeps lying on her right side. Now sleeping with head elevated. Of note, she has a history of Kwon's esophagus requiring ablation in the past, but she denies any sensation of acid reflux ever. She takes omeprazole daily.    She has a sensation of post-nasal drip. No known allergies.    SMOKING HISTORY  Quit at age 41. Smoked 1 PPD for 25 years.    REVIEW OF SYSTEMS  GENERAL: Feeling well. No fevers, chills, or night sweats.  EYES: Vision is good.  ENT: No sinusitis or pharyngitis.   HEART: No chest pain or palpitations.  LUNGS: No cough, sputum, or wheezing.  GI: No abdominal pain, nausea, vomiting, or diarrhea.  : No dysuria, urgency, or frequency.  SKIN: No lesions or rashes.  MUSCULOSKELETAL: No joint pain or myalgias.  NEURO: No headaches or neuropathy.  LYMPH: No edema or adenopathy.  PSYCH: No anxiety or depression.  ENDO: She  has lost 12-15 in the last 3 months unintentionally.    PFSH:  Past Medical History:   Diagnosis Date    Arthritis     Cataract     Colon polyp     Diabetes mellitus type II 2012    Encounter for blood transfusion     Extra-ovarian endometrioid adenocarcinoma 2020    GERD (gastroesophageal reflux disease)     Hyperlipidemia     Hypertension     Macular degeneration     PONV (postoperative nausea and vomiting)     Squamous cell carcinoma 1980's    precancer of cervix         Past Surgical History:   Procedure Laterality Date    AUGMENTATION OF BREAST      CHOLECYSTECTOMY      COLONOSCOPY      ESOPHAGOGASTRODUODENOSCOPY N/A 06/20/2018    Procedure: EGD (ESOPHAGOGASTRODUODENOSCOPY);  Surgeon: Sabino Stephenson MD;  Location: [facility] ENDO;  Service: Endoscopy;  Laterality: N/A;    ESOPHAGOGASTRODUODENOSCOPY N/A 3/31/2023    Procedure: EGD (ESOPHAGOGASTRODUODENOSCOPY);  Surgeon: Sabino Stephenson MD;  Location: [facility] ENDO;  Service: Endoscopy;  Laterality: N/A;    HYSTERECTOMY      partial    INJECTION, SACROILIAC JOINT Right 1/26/2023    Procedure: INJECTION,SACROILIAC JOINT;  Surgeon: Agustin Robles MD;  Location: [facility] OR;  Service: Pain Management;  Laterality: Right;    INSERTION OF TUNNELED CENTRAL VENOUS CATHETER (CVC) WITH SUBCUTANEOUS PORT Right 10/19/2020    Procedure: INSERTION, PORT-A-CATH;  Surgeon: Misti Mcfarland MD;  Location: [facility] OR;  Service: General;  Laterality: Right;    INTRAMEDULLARY RODDING OF TROCHANTER OF FEMUR Right 11/28/2021    Procedure: INSERTION, INTRAMEDULLARY LUC, FEMUR, TROCHANTER/RIGHT TFN DR FAJARDO NOTIFIED REP;  Surgeon: Kp Fajardo MD;  Location: [facility] OR;  Service: Orthopedics;  Laterality: Right;  SKIP    ROBOT-ASSISTED LAPAROSCOPIC LYMPHADENECTOMY USING DA NELLA XI N/A 09/21/2020    Procedure: XI ROBOTIC LYMPHADENECTOMY-pelvic and kell-aortic;  Surgeon: Altagracia Ray MD;  Location: [facility] OR;  Service: OB/GYN;  Laterality: N/A;    ROBOT-ASSISTED LAPAROSCOPIC OMENTECTOMY  USING DA NELLA XI N/A 09/21/2020    Procedure: XI ROBOTIC OMENTECTOMY;  Surgeon: Altagracia Ray MD;  Location: [facility] OR;  Service: OB/GYN;  Laterality: N/A;    ROBOT-ASSISTED LAPAROSCOPIC SALPINGO-OOPHORECTOMY USING DA NELLA XI Bilateral 09/21/2020    Procedure: XI ROBOTIC SALPINGO-OOPHORECTOMY;  Surgeon: Altagracia Ray MD;  Location: [facility] OR;  Service: OB/GYN;  Laterality: Bilateral;    UPPER GASTROINTESTINAL ENDOSCOPY       Social History     Tobacco Use    Smoking status: Former     Packs/day: 1.00     Years: 20.00     Pack years: 20.00     Types: Cigarettes    Smokeless tobacco: Never    Tobacco comments:     quit 40 yrs ago   Substance Use Topics    Alcohol use: Yes     Alcohol/week: 1.0 standard drink     Types: 1 Glasses of wine per week     Comment: occasional    Drug use: No     Family History   Problem Relation Age of Onset    Cancer Mother 57        lung    Cancer Father 56        oral    Skin cancer Sister     Skin cancer Brother     Melanoma Brother     Skin cancer Brother     Breast cancer Maternal Grandmother     Breast cancer Paternal Grandmother     Colon polyps Daughter     Psoriasis Neg Hx     Lupus Neg Hx     Eczema Neg Hx     Colon cancer Neg Hx     Crohn's disease Neg Hx     Esophageal cancer Neg Hx     Stomach cancer Neg Hx     Ulcerative colitis Neg Hx      Review of patient's allergies indicates:  No Known Allergies      VITAL SIGNS (MOST RECENT)  Pulse: 92 (04/13/23 1425)  BP: 121/71 (04/13/23 1425)  SpO2: 96 % (On room air at rest.) (04/13/23 1425)    PHYSICAL EXAM  GENERAL: NAD.  HEENT: Pupils equal and reactive. Extraocular movements intact.   NECK: Supple.   HEART: Regular rate and rhythm. No murmur or gallop auscultated.  LUNGS: Clear to auscultation and percussion. Lung excursion symmetrical.  ABDOMEN: Bowel sounds present. Non-tender, no masses palpated.  EXTREMITIES: Normal muscle tone and joint movement, no cyanosis or clubbing.   LYMPHATICS: No adenopathy palpated, no  edema.  SKIN: Dry, intact, no lesions.   NEURO: No gross cognitive or motor deficits.  PSYCH: Appropriate affect.    Lab Results   Component Value Date    WBC 5.73 03/21/2023    HGB 9.2 (L) 03/21/2023    HCT 29.1 (L) 03/21/2023     03/21/2023    CHOL 177 04/28/2022    TRIG 99 04/28/2022    HDL 49 04/28/2022    ALT 12 03/21/2023    AST 10 03/21/2023     03/21/2023    K 4.0 03/21/2023     03/21/2023    CREATININE 0.9 03/21/2023    BUN 13 03/21/2023    CO2 28 03/21/2023    TSH 0.680 03/18/2023    INR 1.2 03/18/2023    GLUF 106 10/16/2015    HGBA1C 5.7 (H) 11/04/2022       LAST ARTERIAL BLOOD GAS  @LABRCNTIP[PH,PO2,PCO2,HCO3,BE@      LAST 7 DAYS MICROBIOLOGY   Microbiology Results (last 7 days)       ** No results found for the last 168 hours. **            MOST RECENT IMAGING  X-Ray Chest AP Portable  HISTORY: vomiting    COMPARISON:March 3, 2023    FINDINGS:Right subclavian infusion catheter is noted with tip projecting over the entry point the superior vena cava. Calcified breast implants again projecting over the lower chest. Lungs are well expanded and free of active disease. Mild bibasilar atelectasis/infiltrate more prominent towards the right with improvement.. Pulmonary vascularity is without evidence of engorgement. The thoracic spine mild convexity towards the right.    IMPRESSION:  1. No evidence of active process.  2. Right basilar atelectasis/infiltrate with nominal improvement.    Electronically signed by:  Anton Guzman MD  3/19/2023 6:18 AM CDT Workstation: [id number]  CT Abdomen Pelvis With Contrast  EXAM DESCRIPTION: CT ABDOMEN PELVIS WITH CONTRAST 3/19/2023 12:21 AM CDT    CLINICAL HISTORY: 83 years, Female, Epigastric pain    COMPARISON: None    PROCEDURE:    Contrast-enhanced images of the abdomen and pelvis were performed utilizing 2 mm slice thickness at 2 mm interval reconstruction from the lung bases to the ischial tuberosities after the administration of IV contrast.  In  addition multiplanar reformats in the coronal and sagittal plane were obtained and reviewed.  This exam was performed according to our departmental dose-optimization protocol, which includes automated exposure control, adjustment of the mA and/or kV according to patient size and/or use of iterative reconstruction technique.    FINDINGS:    Evaluation of the chest will give superior report    The liver demonstrated presence of subcapsular right hepatic lobe cyst measuring 4.1 x 2.2 cm on image 76. There is a status post cholecystomy. Radiodensity within the peritoneum, anterior to the pancreatic head could correspond perhaps to dystrophic rim calcification/or large calculus from previous cholecystectomy.  The pancreas, spleen and adrenal glands demonstrate to be unremarkable, no focal lesions are noted.  The kidneys demonstrate normal uptake of contrast media. No evidence for nephrolithiasis and/or hydronephrosis. Small bilateral simple renal cysts too small to characterize by CT criteria. No follow-up is recommended.  Grossly the unopacified stomach, small bowel and large bowel demonstrate to be within normal limits.  There is no evidence for bowel dilatation/or free air.  The appendix is normal. The left site colon is decompressed. There is diverticulosis within the sigmoid colon.  The urinary bladder demonstrate to be unremarkable.  The uterus is absent. There are no adnexal masses.  The aorta demonstrate atherosclerotic disease extending into the aortic location.  There is no retroperitoneal lymphadenopathy. There is no evidence for ascites/or significant abnormal fluid collections..  The bone windows demonstrate diffuse bony osteopenia. Degenerative disc disease at L1-L3. ORIF right hip joint.    IMPRESSION:  No acute intra-abdominal process.    Status post cholecystectomy and hysterectomy.    Hepatic cyst.    Atherosclerotic disease of the aorta.    Sigmoid diverticulosis without evidence of acute  diverticulitis.    Degenerative disc disease at L1-L3.    Electronically signed by:  Agustin Fontana MD  3/19/2023 12:26 AM CDT Workstation: [id number]  CTA Chest Non-Coronary (PE Studies)  EXAM DESCRIPTION: CTA CHEST NON CORONARY (PE STUDIES) 3/19/2023 12:15 AM CDT    CLINICAL HISTORY: 83 years, Female, Pulmonary embolism (PE) suspected, high prob    COMPARISON: None    PROCEDURE:  Multiple transaxial tomograms of the chest were obtained from the lung apices through the lung bases utilizing 1 mm slice thickness at 1 mm interval reconstruction after the administration of 100 cc of Omnipaque 350 for complete opacification of the pulmonary arteries.  Subsequent 3-D maximum intensity projection images were generated in the coronal and sagittal plane for review.  This exam was performed according to our departmental dose-optimization protocol, which includes automated exposure control, adjustment of the mA and/or kV according to patient size and/or use of iterative reconstruction technique.    FINDINGS:  The lungs parenchyma demonstrate the presence of centrilobular and paraseptal emphysematous changes. There is a focal area of airspace opacity/consolidation involving the posterior segment right lower lobe no significantly changed when compared to prior study. No significant masses, nodules and/or consolidations are identified.  The trachea mainstem bronchus demonstrate to be normal. There is bronchial some of the right lower lobe demonstrate to be patent. There is no significant pericardial or pleural effusions.  The thoracic aorta demonstrate intimal aortic arch ossification. No evidence for thoracic aortic aneurysm/or dissection. The heart is normal in size. There are coronary artery calcification. Minimal aortic valvular calcification. No evidence for right ventricular strain.  There are prominent right hilar lymph node measuring 1.1 cm on image 116, 1.1 cm on image 119, right infrahilar area measuring 1.1 cm on image  143 and subcarinal region measuring 1.2 cm on image 123. The axillary regions demonstrate to be clear.  Pulmonary arteries demonstrate to be normal, no intraluminal defect are seen that would suggest pulmonary embolus.  The bone windows demonstrate demonstrate minimal anterior spondylosis lower thoracic spine. Inferior deformity with minimal increase sclerosis at T11 perhaps suggesting old compression deformity.  There are bilateral breast implants with rim wall calcification.  Evaluation of the abdomen and pelvis will be given in separate report.    IMPRESSION:  No CT evidence for pulmonary embolism.    Focal area of airspace opacity/consolidation involving the posterior segment right lower lobe no significantly changed when compared to prior study.    Emphysematous changes.    Prominent right hilar and subcarinal lymph nodes, may be reactive in nature.    Inferior deformity with minimal increase sclerosis at T11 perhaps suggesting old compression deformity.    Electronically signed by:  Agustin Fontana MD  3/19/2023 12:21 AM CDT Workstation: [id number]      CURRENT VISIT EKG  No results found for this visit on 04/13/23.    ECHOCARDIOGRAM RESULTS  No results found for this or any previous visit.      Pulmonary Function Tests  No results found for: FEV1, FVC, DMG2TPM, TLC, DLCO    I have personally reviewed recent labs and ABGs, and I have viewed the images of recent chest x-rays and chest CTs.    ASSESSMENT & PLAN   Alexa Otero is a 83 y.o. female with a PMH of ovarian cancer s/p hysterectomy and oophorectomy and chemo 2 years ago, Kwon's esophagus, HTN, and DM2 on whom we have been consulted for recurrent pneumonia.    #Aspiration pneumonia  #Chronic cough  #Subacute and chronic CT abnormalities of RLL  Likely recurrent aspiration and possibly a current pneumonia.  #Post-nasal drip  - start Flonase  - continue sleeping at incline  - continue PPI  - follow with GI  - repeat CT chest in lat e  [place]    #Unexpected weight loss  Low suspicion for pulmonary malignancy.  - scheduled for colonoscopy  - continue follow up with PCP    #Emphysema  Asymptomatic.  - no further workup or treatment at this time    No follow-ups on file.  Follow up in July 2023.    Skip Tariq MD  Pulmonary and Critical Care Medicine  [facility]  Date of Service: 04/13/2023  2:37 PM

## 2023-04-17 ENCOUNTER — INFUSION (OUTPATIENT)
Dept: INFUSION THERAPY | Facility: HOSPITAL | Age: 83
End: 2023-04-17
Attending: OBSTETRICS & GYNECOLOGY
Payer: MEDICARE

## 2023-04-17 VITALS
DIASTOLIC BLOOD PRESSURE: 75 MMHG | BODY MASS INDEX: 22.11 KG/M2 | WEIGHT: 124.81 LBS | OXYGEN SATURATION: 97 % | TEMPERATURE: 98 F | HEART RATE: 78 BPM | RESPIRATION RATE: 18 BRPM | SYSTOLIC BLOOD PRESSURE: 125 MMHG | HEIGHT: 63 IN

## 2023-04-17 DIAGNOSIS — Z51.11 MAINTENANCE CHEMOTHERAPY: Primary | ICD-10-CM

## 2023-04-17 DIAGNOSIS — C56.1 CARCINOMA OF RIGHT OVARY: ICD-10-CM

## 2023-04-17 PROCEDURE — 25000003 PHARM REV CODE 250: Performed by: OBSTETRICS & GYNECOLOGY

## 2023-04-17 PROCEDURE — 96523 IRRIG DRUG DELIVERY DEVICE: CPT

## 2023-04-17 PROCEDURE — 63600175 PHARM REV CODE 636 W HCPCS: Performed by: OBSTETRICS & GYNECOLOGY

## 2023-04-17 PROCEDURE — A4216 STERILE WATER/SALINE, 10 ML: HCPCS | Performed by: OBSTETRICS & GYNECOLOGY

## 2023-04-17 RX ORDER — SODIUM CHLORIDE 0.9 % (FLUSH) 0.9 %
10 SYRINGE (ML) INJECTION
Status: DISCONTINUED | OUTPATIENT
Start: 2023-04-17 | End: 2023-04-17 | Stop reason: HOSPADM

## 2023-04-17 RX ORDER — HEPARIN 100 UNIT/ML
500 SYRINGE INTRAVENOUS
Status: COMPLETED | OUTPATIENT
Start: 2023-04-17 | End: 2023-04-17

## 2023-04-17 RX ORDER — HEPARIN 100 UNIT/ML
500 SYRINGE INTRAVENOUS
Status: CANCELLED
Start: 2023-04-17

## 2023-04-17 RX ORDER — SODIUM CHLORIDE 0.9 % (FLUSH) 0.9 %
10 SYRINGE (ML) INJECTION
Status: CANCELLED
Start: 2023-04-17

## 2023-04-17 RX ADMIN — SODIUM CHLORIDE, PRESERVATIVE FREE 10 ML: 5 INJECTION INTRAVENOUS at 01:04

## 2023-04-17 RX ADMIN — HEPARIN 500 UNITS: 100 SYRINGE at 01:04

## 2023-04-17 NOTE — PLAN OF CARE
Problem: Fatigue  Goal: Improved Activity Tolerance  Intervention: Promote Improved Energy  Flowsheets (Taken 4/17/2023 1304)  Fatigue Management: fatigue-related activity identified  Activity Management: Ambulated -L4

## 2023-04-20 ENCOUNTER — HOSPITAL ENCOUNTER (OUTPATIENT)
Facility: HOSPITAL | Age: 83
Discharge: HOME | End: 2023-04-20
Attending: INTERNAL MEDICINE | Admitting: FAMILY MEDICINE
Payer: MEDICARE

## 2023-04-20 ENCOUNTER — ANESTHESIA (OUTPATIENT)
Dept: ENDOSCOPY | Facility: HOSPITAL | Age: 83
End: 2023-04-20
Payer: MEDICARE

## 2023-04-20 ENCOUNTER — ANESTHESIA EVENT (OUTPATIENT)
Dept: ENDOSCOPY | Facility: HOSPITAL | Age: 83
End: 2023-04-20
Payer: MEDICARE

## 2023-04-20 DIAGNOSIS — K59.00 CONSTIPATION, UNSPECIFIED: ICD-10-CM

## 2023-04-20 DIAGNOSIS — K64.8 INTERNAL HEMORRHOIDS: ICD-10-CM

## 2023-04-20 DIAGNOSIS — K57.90 DIVERTICULOSIS: Primary | ICD-10-CM

## 2023-04-20 DIAGNOSIS — K63.5 POLYP OF COLON, UNSPECIFIED PART OF COLON, UNSPECIFIED TYPE: ICD-10-CM

## 2023-04-20 PROCEDURE — 45380 PR COLONOSCOPY,BIOPSY: ICD-10-PCS | Mod: 59,HCNC,, | Performed by: INTERNAL MEDICINE

## 2023-04-20 PROCEDURE — 63600175 PHARM REV CODE 636 W HCPCS: Mod: HCNC | Performed by: NURSE ANESTHETIST, CERTIFIED REGISTERED

## 2023-04-20 PROCEDURE — 25000003 PHARM REV CODE 250: Mod: HCNC | Performed by: INTERNAL MEDICINE

## 2023-04-20 PROCEDURE — 45385 PR COLONOSCOPY,REMV LESN,SNARE: ICD-10-PCS | Mod: HCNC,,, | Performed by: INTERNAL MEDICINE

## 2023-04-20 PROCEDURE — D9220A PRA ANESTHESIA: Mod: ANES,,, | Performed by: ANESTHESIOLOGY

## 2023-04-20 PROCEDURE — 37000008 HC ANESTHESIA 1ST 15 MINUTES: Mod: HCNC | Performed by: INTERNAL MEDICINE

## 2023-04-20 PROCEDURE — 88305 TISSUE EXAM BY PATHOLOGIST: CPT | Mod: 26,,, | Performed by: PATHOLOGY

## 2023-04-20 PROCEDURE — 88305 TISSUE EXAM BY PATHOLOGIST: ICD-10-PCS | Mod: 26,,, | Performed by: PATHOLOGY

## 2023-04-20 PROCEDURE — 27201089 HC SNARE, DISP (ANY): Mod: HCNC | Performed by: INTERNAL MEDICINE

## 2023-04-20 PROCEDURE — D9220A PRA ANESTHESIA: ICD-10-PCS | Mod: ANES,,, | Performed by: ANESTHESIOLOGY

## 2023-04-20 PROCEDURE — 88305 TISSUE EXAM BY PATHOLOGIST: CPT | Performed by: PATHOLOGY

## 2023-04-20 PROCEDURE — 45385 COLONOSCOPY W/LESION REMOVAL: CPT | Mod: HCNC | Performed by: INTERNAL MEDICINE

## 2023-04-20 PROCEDURE — 27201012 HC FORCEPS, HOT/COLD, DISP: Mod: HCNC | Performed by: INTERNAL MEDICINE

## 2023-04-20 PROCEDURE — 45385 COLONOSCOPY W/LESION REMOVAL: CPT | Mod: HCNC,,, | Performed by: INTERNAL MEDICINE

## 2023-04-20 PROCEDURE — 45380 COLONOSCOPY AND BIOPSY: CPT | Mod: 59,HCNC | Performed by: INTERNAL MEDICINE

## 2023-04-20 PROCEDURE — D9220A PRA ANESTHESIA: ICD-10-PCS | Mod: CRNA,,, | Performed by: NURSE ANESTHETIST, CERTIFIED REGISTERED

## 2023-04-20 PROCEDURE — 37000009 HC ANESTHESIA EA ADD 15 MINS: Mod: HCNC | Performed by: INTERNAL MEDICINE

## 2023-04-20 PROCEDURE — 45380 COLONOSCOPY AND BIOPSY: CPT | Mod: 59,HCNC,, | Performed by: INTERNAL MEDICINE

## 2023-04-20 PROCEDURE — D9220A PRA ANESTHESIA: Mod: CRNA,,, | Performed by: NURSE ANESTHETIST, CERTIFIED REGISTERED

## 2023-04-20 RX ORDER — PROPOFOL 10 MG/ML
VIAL (ML) INTRAVENOUS
Status: DISCONTINUED | OUTPATIENT
Start: 2023-04-20 | End: 2023-04-20

## 2023-04-20 RX ORDER — SODIUM CHLORIDE 9 MG/ML
INJECTION, SOLUTION INTRAVENOUS CONTINUOUS
Status: DISCONTINUED | OUTPATIENT
Start: 2023-04-20 | End: 2023-04-20 | Stop reason: HOSPADM

## 2023-04-20 RX ADMIN — PROPOFOL 20 MG: 10 INJECTION, EMULSION INTRAVENOUS at 08:04

## 2023-04-20 RX ADMIN — PROPOFOL 80 MG: 10 INJECTION, EMULSION INTRAVENOUS at 08:04

## 2023-04-20 RX ADMIN — SODIUM CHLORIDE: 9 INJECTION, SOLUTION INTRAVENOUS at 08:04

## 2023-04-20 NOTE — ANESTHESIA POSTPROCEDURE EVALUATION
Anesthesia Post Evaluation    Patient: Alexa Otero    Procedure(s) Performed: Procedure(s) (LRB):  COLONOSCOPY (N/A)    Final Anesthesia Type: general      Patient location during evaluation: PACU  Patient participation: Yes- Able to Participate  Level of consciousness: awake and alert  Post-procedure vital signs: reviewed and stable  Pain management: adequate  Airway patency: patent    PONV status at discharge: No PONV  Anesthetic complications: no      Cardiovascular status: blood pressure returned to baseline  Respiratory status: unassisted  Hydration status: euvolemic  Follow-up not needed.          Vitals Value Taken Time   /66 04/20/23 0928   Temp 36.4 °C (97.5 °F) 04/20/23 0901   Pulse 89 04/20/23 0928   Resp 18 04/20/23 1201   SpO2 99 % 04/20/23 0928         Event Time   Out of Recovery 09:36:00         Pain/Jess Score: Jess Score: 10 (4/20/2023  9:32 AM)

## 2023-04-20 NOTE — ANESTHESIA PREPROCEDURE EVALUATION
04/20/2023  Alexa Otero is a 83 y.o., female.      Pre-op Assessment    I have reviewed the Patient Summary Reports.     I have reviewed the Nursing Notes. I have reviewed the NPO Status.   I have reviewed the Medications.     Review of Systems  Anesthesia Hx:  Denies Family Hx of Anesthesia complications.  Personal Hx of Anesthesia complications, Post-Operative Nausea/Vomiting, in the past, but not with recent anesthetics / prophylaxis   Cardiovascular:   Hypertension    Pulmonary:   Pneumonia    Renal/:   Chronic Renal Disease, CKD    Hepatic/GI:   Bowel Prep. GERD    Musculoskeletal:   Arthritis   Spine Disorders: lumbar Chronic Pain    Endocrine:   Diabetes, type 2        Physical Exam  General: Well nourished, Cooperative, Alert and Oriented    Airway:  Mallampati: II           Anesthesia Plan  Type of Anesthesia, risks & benefits discussed:    Anesthesia Type: Gen Natural Airway  Intra-op Monitoring Plan: Standard ASA Monitors  Induction:  IV  Informed Consent: Informed consent signed with the Patient and all parties understand the risks and agree with anesthesia plan.  All questions answered.   ASA Score: 2    Ready For Surgery From Anesthesia Perspective.     .

## 2023-04-20 NOTE — PROVATION PATIENT INSTRUCTIONS
Discharge Summary/Instructions after an Endoscopic Procedure  Patient Name: Alexa Otero  Patient MRN: [id number]  Patient YOB: 1940 Thursday, April 20, 2023  Sabino Dasilva MD  Dear patient,  As a result of recent federal legislation (The Federal Cures Act), you may   receive lab or pathology results from your procedure in your [facility]   account before your physician is able to contact you. Your physician or   their representative will relay the results to you with their   recommendations at their soonest availability.  Thank you,  RESTRICTIONS:  During your procedure today, you received medications for sedation.  These   medications may affect your judgment, balance and coordination.  Therefore,   for 24 hours, you have the following restrictions:   - DO NOT drive a car, operate machinery, make legal/financial decisions,   sign important papers or drink alcohol.    ACTIVITY:  Today: no heavy lifting, straining or running due to procedural   sedation/anesthesia.  The following day: return to full activity including work.  DIET:  Eat and drink normally unless instructed otherwise.     TREATMENT FOR COMMON SIDE EFFECTS:  - Mild abdominal pain, nausea, belching, bloating or excessive gas:  rest,   eat lightly and use a heating pad.  - Sore Throat: treat with throat lozenges and/or gargle with warm salt   water.  - Because air was used during the procedure, expelling large amounts of air   from your rectum or belching is normal.  - If a bowel prep was taken, you may not have a bowel movement for 1-3 days.    This is normal.  SYMPTOMS TO WATCH FOR AND REPORT TO YOUR PHYSICIAN:  1. Abdominal pain or bloating, other than gas cramps.  2. Chest pain.  3. Back pain.  4. Signs of infection such as: chills or fever occurring within 24 hours   after the procedure.  5. Rectal bleeding, which would show as bright red, maroon, or black stools.   (A tablespoon of blood from the rectum is not serious, especially if    hemorrhoids are present.)  6. Vomiting.  7. Weakness or dizziness.  GO DIRECTLY TO THE NEAREST EMERGENCY ROOM IF YOU HAVE ANY OF THE FOLLOWING:      Difficulty breathing              Chills and/or fever over 101 F   Persistent vomiting and/or vomiting blood   Severe abdominal pain   Severe chest pain   Black, tarry stools   Bleeding- more than one tablespoon   Any other symptom or condition that you feel may need urgent attention  Your doctor recommends these additional instructions:  If any biopsies were taken, your doctors clinic will contact you in 1 to 2   weeks with any results.  - Patient has a contact number available for emergencies.  The signs and   symptoms of potential delayed complications were discussed with the   patient.  Return to normal activities tomorrow.  Written discharge   instructions were provided to the patient.   - High fiber diet.   - Continue present medications.   - Await pathology results.   - No repeat colonoscopy due to age.   - Discharge patient to home (ambulatory).   - Return to GI office after studies are complete.  For questions, problems or results please call your physician - Sabino Dasilva MD at Work:  [phone].  [facility] PHONE NUMBER: [phone]  IF A COMPLICATION OR EMERGENCY SITUATION ARISES AND YOU ARE UNABLE TO REACH   YOUR PHYSICIAN - GO DIRECTLY TO THE EMERGENCY ROOM.  Sabino Dasilva MD  4/20/2023 8:54:59 AM  This report has been verified and signed electronically.  Dear patient,  As a result of recent federal legislation (The Federal Cures Act), you may   receive lab or pathology results from your procedure in your [facility]   account before your physician is able to contact you. Your physician or   their representative will relay the results to you with their   recommendations at their soonest availability.  Thank you,  PROVATION

## 2023-04-20 NOTE — TRANSFER OF CARE
Anesthesia Transfer of Care Note    Patient: Alexa Otero    Procedure(s) Performed: Procedure(s) (LRB):  COLONOSCOPY (N/A)    Patient location: GI    Anesthesia Type: general    Transport from OR: Transported from OR on 2-3 L/min O2 by NC with adequate spontaneous ventilation    Post pain: adequate analgesia    Post assessment: no apparent anesthetic complications    Post vital signs: stable    Level of consciousness: awake    Nausea/Vomiting: no nausea/vomiting    Complications: none    Transfer of care protocol was followed      Last vitals:   Visit Vitals  Temp 36.9 °C (98.4 °F) (Skin)   Wt 56.2 kg (124 lb)   Breastfeeding No   BMI 21.97 kg/m²

## 2023-04-20 NOTE — INTERVAL H&P NOTE
The patient has been examined and the H&P has been reviewed:    I concur with the findings and changes have been noted since the H&P was written: Patient now presents for colonoscopy.    Surgery risks, benefits and alternative options discussed and understood by patient/family.          There are no hospital problems to display for this patient.

## 2023-04-21 VITALS
OXYGEN SATURATION: 99 % | DIASTOLIC BLOOD PRESSURE: 66 MMHG | BODY MASS INDEX: 21.97 KG/M2 | TEMPERATURE: 98 F | SYSTOLIC BLOOD PRESSURE: 135 MMHG | HEART RATE: 89 BPM | WEIGHT: 124 LBS

## 2023-04-23 ENCOUNTER — PATIENT MESSAGE (OUTPATIENT)
Dept: FAMILY MEDICINE | Facility: CLINIC | Age: 83
End: 2023-04-23
Payer: MEDICARE

## 2023-04-23 DIAGNOSIS — R09.89 CHRONIC SINUS COMPLAINTS: Primary | ICD-10-CM

## 2023-04-24 ENCOUNTER — TELEPHONE (OUTPATIENT)
Dept: FAMILY MEDICINE | Facility: CLINIC | Age: 83
End: 2023-04-24
Payer: MEDICARE

## 2023-04-24 DIAGNOSIS — Z12.31 SCREENING MAMMOGRAM, ENCOUNTER FOR: Primary | ICD-10-CM

## 2023-04-25 ENCOUNTER — PATIENT MESSAGE (OUTPATIENT)
Dept: FAMILY MEDICINE | Facility: CLINIC | Age: 83
End: 2023-04-25
Payer: MEDICARE

## 2023-04-27 ENCOUNTER — OFFICE VISIT (OUTPATIENT)
Dept: PULMONOLOGY | Facility: CLINIC | Age: 83
End: 2023-04-27
Payer: MEDICARE

## 2023-04-27 ENCOUNTER — HOSPITAL ENCOUNTER (OUTPATIENT)
Dept: RADIOLOGY | Facility: HOSPITAL | Age: 83
Discharge: HOME | End: 2023-04-27
Attending: INTERNAL MEDICINE
Payer: MEDICARE

## 2023-04-27 ENCOUNTER — TELEPHONE (OUTPATIENT)
Dept: PULMONOLOGY | Facility: CLINIC | Age: 83
End: 2023-04-27

## 2023-04-27 VITALS
BODY MASS INDEX: 21.97 KG/M2 | SYSTOLIC BLOOD PRESSURE: 115 MMHG | DIASTOLIC BLOOD PRESSURE: 65 MMHG | HEIGHT: 63 IN | WEIGHT: 124 LBS | HEART RATE: 100 BPM | OXYGEN SATURATION: 99 %

## 2023-04-27 DIAGNOSIS — J44.9 CHRONIC OBSTRUCTIVE PULMONARY DISEASE, UNSPECIFIED COPD TYPE: ICD-10-CM

## 2023-04-27 DIAGNOSIS — J18.9 PNEUMONIA OF RIGHT LOWER LOBE DUE TO INFECTIOUS ORGANISM: Primary | ICD-10-CM

## 2023-04-27 DIAGNOSIS — J69.0 ASPIRATION PNEUMONIA OF RIGHT LOWER LOBE DUE TO GASTRIC SECRETIONS: ICD-10-CM

## 2023-04-27 PROCEDURE — 1159F MED LIST DOCD IN RCRD: CPT | Mod: CPTII,S$GLB,, | Performed by: INTERNAL MEDICINE

## 2023-04-27 PROCEDURE — 1126F PR PAIN SEVERITY QUANTIFIED, NO PAIN PRESENT: ICD-10-PCS | Mod: CPTII,S$GLB,, | Performed by: INTERNAL MEDICINE

## 2023-04-27 PROCEDURE — 71046 X-RAY EXAM CHEST 2 VIEWS: CPT | Mod: TC

## 2023-04-27 PROCEDURE — 3074F SYST BP LT 130 MM HG: CPT | Mod: CPTII,S$GLB,, | Performed by: INTERNAL MEDICINE

## 2023-04-27 PROCEDURE — 1157F ADVNC CARE PLAN IN RCRD: CPT | Mod: CPTII,S$GLB,, | Performed by: INTERNAL MEDICINE

## 2023-04-27 PROCEDURE — 3078F DIAST BP <80 MM HG: CPT | Mod: CPTII,S$GLB,, | Performed by: INTERNAL MEDICINE

## 2023-04-27 PROCEDURE — 1126F AMNT PAIN NOTED NONE PRSNT: CPT | Mod: CPTII,S$GLB,, | Performed by: INTERNAL MEDICINE

## 2023-04-27 PROCEDURE — 1159F PR MEDICATION LIST DOCUMENTED IN MEDICAL RECORD: ICD-10-PCS | Mod: CPTII,S$GLB,, | Performed by: INTERNAL MEDICINE

## 2023-04-27 PROCEDURE — 99214 OFFICE O/P EST MOD 30 MIN: CPT | Mod: S$GLB,,, | Performed by: INTERNAL MEDICINE

## 2023-04-27 PROCEDURE — 3074F PR MOST RECENT SYSTOLIC BLOOD PRESSURE < 130 MM HG: ICD-10-PCS | Mod: CPTII,S$GLB,, | Performed by: INTERNAL MEDICINE

## 2023-04-27 PROCEDURE — 1157F PR ADVANCE CARE PLAN OR EQUIV PRESENT IN MEDICAL RECORD: ICD-10-PCS | Mod: CPTII,S$GLB,, | Performed by: INTERNAL MEDICINE

## 2023-04-27 PROCEDURE — 99214 PR OFFICE/OUTPT VISIT, EST, LEVL IV, 30-39 MIN: ICD-10-PCS | Mod: S$GLB,,, | Performed by: INTERNAL MEDICINE

## 2023-04-27 PROCEDURE — 3078F PR MOST RECENT DIASTOLIC BLOOD PRESSURE < 80 MM HG: ICD-10-PCS | Mod: CPTII,S$GLB,, | Performed by: INTERNAL MEDICINE

## 2023-04-27 RX ORDER — FLUTICASONE PROPIONATE 50 MCG
1 SPRAY, SUSPENSION (ML) NASAL DAILY
COMMUNITY
End: 2023-06-13

## 2023-04-27 NOTE — PROGRESS NOTES
SUBJECTIVE:    Patient ID: Alexa Otero is a 83 y.o. female.    Chief Complaint: Follow-up    HPI   The patient returns still coughing him producing about half a cup of mucus at night.  She is following with Dr. Tariq concurrently.  She knows she needs to pick 1 of us and move from there.    Past Medical History:   Diagnosis Date    Arthritis     Cataract     Colon polyp     Diabetes mellitus type II 2012    Encounter for blood transfusion     Extra-ovarian endometrioid adenocarcinoma 2020    GERD (gastroesophageal reflux disease)     Hyperlipidemia     Hypertension     Macular degeneration     PONV (postoperative nausea and vomiting)     Squamous cell carcinoma 1980's    precancer of cervix     Past Surgical History:   Procedure Laterality Date    AUGMENTATION OF BREAST      CHOLECYSTECTOMY      COLONOSCOPY      COLONOSCOPY N/A 4/20/2023    Procedure: COLONOSCOPY;  Surgeon: Sabino Stephenson MD;  Location: [facility];  Service: Endoscopy;  Laterality: N/A;    ESOPHAGOGASTRODUODENOSCOPY N/A 06/20/2018    Procedure: EGD (ESOPHAGOGASTRODUODENOSCOPY);  Surgeon: Sabino Stephenson MD;  Location: [facility];  Service: Endoscopy;  Laterality: N/A;    ESOPHAGOGASTRODUODENOSCOPY N/A 3/31/2023    Procedure: EGD (ESOPHAGOGASTRODUODENOSCOPY);  Surgeon: Sabino Stephenson MD;  Location: [facility] ENDO;  Service: Endoscopy;  Laterality: N/A;    HYSTERECTOMY      partial    INJECTION, SACROILIAC JOINT Right 1/26/2023    Procedure: INJECTION,SACROILIAC JOINT;  Surgeon: Agustin Robles MD;  Location: [facility] OR;  Service: Pain Management;  Laterality: Right;    INSERTION OF TUNNELED CENTRAL VENOUS CATHETER (CVC) WITH SUBCUTANEOUS PORT Right 10/19/2020    Procedure: INSERTION, PORT-A-CATH;  Surgeon: Misti Mcfarland MD;  Location: [facility] OR;  Service: General;  Laterality: Right;    INTRAMEDULLARY RODDING OF TROCHANTER OF FEMUR Right 11/28/2021    Procedure: INSERTION, INTRAMEDULLARY LUC, FEMUR, TROCHANTER/RIGHT TFN DR FAJARDO NOTIFIED REP;   Surgeon: Kp Gutierres MD;  Location: [facility] OR;  Service: Orthopedics;  Laterality: Right;  SKIP    ROBOT-ASSISTED LAPAROSCOPIC LYMPHADENECTOMY USING DA NELLA XI N/A 09/21/2020    Procedure: XI ROBOTIC LYMPHADENECTOMY-pelvic and kell-aortic;  Surgeon: Altagracia Ray MD;  Location: [facility] OR;  Service: OB/GYN;  Laterality: N/A;    ROBOT-ASSISTED LAPAROSCOPIC OMENTECTOMY USING DA NELLA XI N/A 09/21/2020    Procedure: XI ROBOTIC OMENTECTOMY;  Surgeon: Altagracia Ray MD;  Location: [facility] OR;  Service: OB/GYN;  Laterality: N/A;    ROBOT-ASSISTED LAPAROSCOPIC SALPINGO-OOPHORECTOMY USING DA NELLA XI Bilateral 09/21/2020    Procedure: XI ROBOTIC SALPINGO-OOPHORECTOMY;  Surgeon: Altagracia Ray MD;  Location: [facility] OR;  Service: OB/GYN;  Laterality: Bilateral;    UPPER GASTROINTESTINAL ENDOSCOPY       Family History   Problem Relation Age of Onset    Cancer Mother 57        lung    Cancer Father 56        oral    Skin cancer Sister     Skin cancer Brother     Melanoma Brother     Skin cancer Brother     Breast cancer Maternal Grandmother     Breast cancer Paternal Grandmother     Colon polyps Daughter     Psoriasis Neg Hx     Lupus Neg Hx     Eczema Neg Hx     Colon cancer Neg Hx     Crohn's disease Neg Hx     Esophageal cancer Neg Hx     Stomach cancer Neg Hx     Ulcerative colitis Neg Hx         Social History:   Marital Status:   Occupation: Data Unavailable  Alcohol History:  reports current alcohol use of about 1.0 standard drink per week.  Tobacco History:  reports that she has quit smoking. Her smoking use included cigarettes. She has a 20.00 pack-year smoking history. She has never used smokeless tobacco.  Drug History:  reports no history of drug use.    Review of patient's allergies indicates:  No Known Allergies    Current Outpatient Medications   Medication Sig Dispense Refill    benazepriL (LOTENSIN) 40 MG tablet TAKE 1 TABLET(40 MG) BY MOUTH EVERY DAY 90 tablet 2    fluticasone  propionate (FLONASE) 50 mcg/actuation nasal spray 1 spray by Each Nostril route once daily.      gabapentin (NEURONTIN) 100 MG capsule TAKE 1 CAPSULE(100 MG) BY MOUTH TWICE DAILY 180 capsule 3    hydroCHLOROthiazide (HYDRODIURIL) 25 MG tablet TAKE 1 TABLET(25 MG) BY MOUTH EVERY DAY 90 tablet 3    metFORMIN (GLUCOPHAGE) 500 MG tablet TAKE 1 TABLET(500 MG) BY MOUTH TWICE DAILY WITH MEALS 180 tablet 3    omeprazole (PRILOSEC) 40 MG capsule TAKE 1 CAPSULE(40 MG) BY MOUTH EVERY DAY 90 capsule 3    simvastatin (ZOCOR) 40 MG tablet TAKE 1 TABLET(40 MG) BY MOUTH EVERY EVENING 90 tablet 0    VIT A/VIT C/VIT E/ZINC/COPPER (ICAPS AREDS ORAL) Take 1 capsule by mouth 2 (two) times a day.        No current facility-administered medications for this visit.       Alpha-1 Antitrypsin:  Last PFT:   Last CT:3/18/23  IMPRESSION:  No CT evidence for pulmonary embolism.     Focal area of airspace opacity/consolidation involving the posterior segment right lower lobe no significantly changed when compared to prior study.   Emphysematous changes.   Prominent right hilar and subcarinal lymph nodes, may be reactive in nature.   Inferior deformity with minimal increase sclerosis at T11 perhaps suggesting old compression deformity.    Review of Systems  General: Feeling ok  Eyes: Vision is good. She has macular degeneration.  ENT:  No sinusitis or pharyngitis.   Heart:: No chest pain or palpitations.  Lungs: Coughs, produces clear mucus.  She is no longer coughing for 5 minutes at a time but is still producing a great quantity of clear mucus through the day and night  GI:  Back to alternating diarrhea and constipation  : No dysuria, hesitancy, or nocturia.  Musculoskeletal: has R hip pain  Skin: No lesions or rashes.  Neuro: No headaches or neuropathy.  Lymph: No edema or adenopathy.  Psych: No anxiety or depression.  Endo: Weight loss 7-8 pounds down    OBJECTIVE:      /65 (BP Location: Left arm, Patient Position: Sitting, BP Method:  "Medium (Manual))   Pulse 100   Ht 5' 3" (1.6 m)   Wt 56.2 kg (124 lb)   SpO2 99%   BMI 21.97 kg/m²     Physical Exam  GENERAL: Older patient in no distress.  HEENT: Pupils equal and reactive. Extraocular movements intact. Nose intact.      Pharynx moist.  NECK: Supple.   HEART: Regular rate and rhythm. No murmur or gallop auscultated.  LUNGS:  Dense crackles in the right base.. Lung excursion symmetrical. No change in fremitus.  ABDOMEN: Bowel sounds present. Non-tender, no masses palpated.  EXTREMITIES: Normal muscle tone and joint movement, no cyanosis or clubbing.   LYMPHATICS: No adenopathy palpated, no edema.  SKIN: Dry, intact, no lesions.   NEURO: Cranial nerves II-XII intact. Motor strength 5/5 bilaterally, upper and lower extremities.  PSYCH: Appropriate affect.    Assessment:       1. Pneumonia of right lower lobe due to infectious organism    2. Chronic obstructive pulmonary disease, unspecified COPD type          The patient is continuing to have dense crackles in the right lower lobe and produce a great deal of mucus.  She has remote smoking history but I am very worried about a bronchoalveolar cell carcinoma.  Is also possible that she has a foreign body that she is aspirated into that right lower lobe that she is not cleared.  Plan:       Pneumonia of right lower lobe due to infectious organism  -     Complete PFT with bronchodilator; Future    Chronic obstructive pulmonary disease, unspecified COPD type  -     Complete PFT with bronchodilator; Future           Follow up in about 3 months (around 7/27/2023).  Head of bed up 4 inches  Continue omeprazole  No caffeine, chocolate, or peppermints in the evenings  CXR today, bronch if not significantly improved  PFT's      "

## 2023-04-28 RX ORDER — SIMVASTATIN 40 MG/1
TABLET, FILM COATED ORAL
Qty: 90 TABLET | Refills: 0 | Status: SHIPPED | OUTPATIENT
Start: 2023-04-28 | End: 2023-07-27 | Stop reason: SDUPTHER

## 2023-04-28 NOTE — TELEPHONE ENCOUNTER
Care Due:                  Date            Visit Type   Department     Provider  --------------------------------------------------------------------------------                                SAME DAY -                              ESTABLISHED   [facility]  Last Visit: 02-      PATIENT      MEDICINE       Andrew Deshpande                              EP -                              PRIMARY      [facility]  Next Visit: 05-      CARE ([facility])   MEDICINE       Idalia Greco                                                            Last  Test          Frequency    Reason                     Performed    Due Date  --------------------------------------------------------------------------------    Lipid Panel.  12 months..  simvastatin..............  04- 04-    [facility] Embedded Care Due Messages. Reference number: [id number].   4/28/2023 5:54:34 AM CDT

## 2023-04-28 NOTE — TELEPHONE ENCOUNTER
Patient's chest x-ray is not improved at all.  This is going on for months.  She needs a bronch.  Will perform on the 11th hopefully at 7:30 a.m..

## 2023-05-01 LAB
FINAL PATHOLOGIC DIAGNOSIS: NORMAL
GROSS: NORMAL
Lab: NORMAL

## 2023-05-03 ENCOUNTER — TELEPHONE (OUTPATIENT)
Dept: OTOLARYNGOLOGY | Facility: CLINIC | Age: 83
End: 2023-05-03
Payer: MEDICARE

## 2023-05-03 ENCOUNTER — HOSPITAL ENCOUNTER (OUTPATIENT)
Dept: RADIOLOGY | Facility: HOSPITAL | Age: 83
Discharge: HOME | End: 2023-05-03
Attending: FAMILY MEDICINE
Payer: MEDICARE

## 2023-05-03 DIAGNOSIS — Z12.31 SCREENING MAMMOGRAM, ENCOUNTER FOR: ICD-10-CM

## 2023-05-03 PROCEDURE — 77067 SCR MAMMO BI INCL CAD: CPT | Mod: TC,PO

## 2023-05-03 NOTE — TELEPHONE ENCOUNTER
Called pt regarding appt with Dr. Jose next week. It looks like pt is scheduled for an outpatient procedure that morning in addition to the appt with Dr. Jose. Pt states that she was actually going to call us and cancel the appt with ENT. States that her Pulmonologist is doing the procedure and stated that she may not need ENT evaluation. Advised pt to let us know if she does wish to reschedule in the future. Appt has been canceled. Thanks, Ileana

## 2023-05-08 ENCOUNTER — LAB VISIT (OUTPATIENT)
Dept: LAB | Facility: HOSPITAL | Age: 83
End: 2023-05-08
Attending: FAMILY MEDICINE
Payer: MEDICARE

## 2023-05-08 DIAGNOSIS — E55.9 VITAMIN D DEFICIENCY DISEASE: ICD-10-CM

## 2023-05-08 DIAGNOSIS — E78.5 HYPERLIPIDEMIA, UNSPECIFIED HYPERLIPIDEMIA TYPE: ICD-10-CM

## 2023-05-08 DIAGNOSIS — E11.9 CONTROLLED TYPE 2 DIABETES MELLITUS WITHOUT COMPLICATION, WITHOUT LONG-TERM CURRENT USE OF INSULIN: ICD-10-CM

## 2023-05-08 LAB
25(OH)D3+25(OH)D2 SERPL-MCNC: 38 NG/ML (ref 30–96)
ALBUMIN SERPL BCP-MCNC: 4.1 G/DL (ref 3.5–5.2)
ALP SERPL-CCNC: 58 U/L (ref 55–135)
ALT SERPL W/O P-5'-P-CCNC: 9 U/L (ref 10–44)
ANION GAP SERPL CALC-SCNC: 13 MMOL/L (ref 8–16)
AST SERPL-CCNC: 15 U/L (ref 10–40)
BASOPHILS # BLD AUTO: 0.05 K/UL (ref 0–0.2)
BASOPHILS NFR BLD: 0.6 % (ref 0–1.9)
BILIRUB SERPL-MCNC: 0.6 MG/DL (ref 0.1–1)
BUN SERPL-MCNC: 17 MG/DL (ref 8–23)
CALCIUM SERPL-MCNC: 10 MG/DL (ref 8.7–10.5)
CHLORIDE SERPL-SCNC: 104 MMOL/L (ref 95–110)
CHOLEST SERPL-MCNC: 169 MG/DL (ref 120–199)
CHOLEST/HDLC SERPL: 3 {RATIO} (ref 2–5)
CO2 SERPL-SCNC: 27 MMOL/L (ref 23–29)
CREAT SERPL-MCNC: 1 MG/DL (ref 0.5–1.4)
DIFFERENTIAL METHOD: ABNORMAL
EOSINOPHIL # BLD AUTO: 0.1 K/UL (ref 0–0.5)
EOSINOPHIL NFR BLD: 1.8 % (ref 0–8)
ERYTHROCYTE [DISTWIDTH] IN BLOOD BY AUTOMATED COUNT: 14.5 % (ref 11.5–14.5)
EST. GFR  (NO RACE VARIABLE): 56.2 ML/MIN/1.73 M^2
ESTIMATED AVG GLUCOSE: 108 MG/DL (ref 68–131)
GLUCOSE SERPL-MCNC: 103 MG/DL (ref 70–110)
HBA1C MFR BLD: 5.4 % (ref 4–5.6)
HCT VFR BLD AUTO: 39.8 % (ref 37–48.5)
HDLC SERPL-MCNC: 57 MG/DL (ref 40–75)
HDLC SERPL: 33.7 % (ref 20–50)
HGB BLD-MCNC: 12.4 G/DL (ref 12–16)
IMM GRANULOCYTES # BLD AUTO: 0.01 K/UL (ref 0–0.04)
IMM GRANULOCYTES NFR BLD AUTO: 0.1 % (ref 0–0.5)
LDLC SERPL CALC-MCNC: 98.2 MG/DL (ref 63–159)
LYMPHOCYTES # BLD AUTO: 1.7 K/UL (ref 1–4.8)
LYMPHOCYTES NFR BLD: 21.6 % (ref 18–48)
MCH RBC QN AUTO: 29.5 PG (ref 27–31)
MCHC RBC AUTO-ENTMCNC: 31.2 G/DL (ref 32–36)
MCV RBC AUTO: 95 FL (ref 82–98)
MONOCYTES # BLD AUTO: 0.6 K/UL (ref 0.3–1)
MONOCYTES NFR BLD: 7.1 % (ref 4–15)
NEUTROPHILS # BLD AUTO: 5.3 K/UL (ref 1.8–7.7)
NEUTROPHILS NFR BLD: 68.8 % (ref 38–73)
NONHDLC SERPL-MCNC: 112 MG/DL
NRBC BLD-RTO: 0 /100 WBC
PLATELET # BLD AUTO: 299 K/UL (ref 150–450)
PMV BLD AUTO: 10 FL (ref 9.2–12.9)
POTASSIUM SERPL-SCNC: 4 MMOL/L (ref 3.5–5.1)
PROT SERPL-MCNC: 7 G/DL (ref 6–8.4)
RBC # BLD AUTO: 4.21 M/UL (ref 4–5.4)
SODIUM SERPL-SCNC: 144 MMOL/L (ref 136–145)
TRIGL SERPL-MCNC: 69 MG/DL (ref 30–150)
WBC # BLD AUTO: 7.72 K/UL (ref 3.9–12.7)

## 2023-05-08 PROCEDURE — 36415 COLL VENOUS BLD VENIPUNCTURE: CPT | Mod: PO | Performed by: FAMILY MEDICINE

## 2023-05-08 PROCEDURE — 80061 LIPID PANEL: CPT | Performed by: FAMILY MEDICINE

## 2023-05-08 PROCEDURE — 85025 COMPLETE CBC W/AUTO DIFF WBC: CPT | Performed by: FAMILY MEDICINE

## 2023-05-08 PROCEDURE — 80053 COMPREHEN METABOLIC PANEL: CPT | Performed by: FAMILY MEDICINE

## 2023-05-08 PROCEDURE — 83036 HEMOGLOBIN GLYCOSYLATED A1C: CPT | Performed by: FAMILY MEDICINE

## 2023-05-08 PROCEDURE — 82306 VITAMIN D 25 HYDROXY: CPT | Performed by: FAMILY MEDICINE

## 2023-05-09 ENCOUNTER — HOSPITAL ENCOUNTER (OUTPATIENT)
Dept: PULMONOLOGY | Facility: HOSPITAL | Age: 83
Discharge: HOME | End: 2023-05-09
Attending: INTERNAL MEDICINE
Payer: MEDICARE

## 2023-05-09 ENCOUNTER — HOSPITAL ENCOUNTER (OUTPATIENT)
Dept: PREADMISSION TESTING | Facility: HOSPITAL | Age: 83
Discharge: HOME | End: 2023-05-09
Attending: INTERNAL MEDICINE
Payer: MEDICARE

## 2023-05-09 VITALS
WEIGHT: 123 LBS | HEART RATE: 83 BPM | DIASTOLIC BLOOD PRESSURE: 75 MMHG | RESPIRATION RATE: 16 BRPM | HEIGHT: 63 IN | TEMPERATURE: 98 F | SYSTOLIC BLOOD PRESSURE: 127 MMHG | BODY MASS INDEX: 21.79 KG/M2 | OXYGEN SATURATION: 98 %

## 2023-05-09 DIAGNOSIS — J18.9 PNEUMONIA OF RIGHT LOWER LOBE DUE TO INFECTIOUS ORGANISM: ICD-10-CM

## 2023-05-09 DIAGNOSIS — J44.9 CHRONIC OBSTRUCTIVE PULMONARY DISEASE, UNSPECIFIED COPD TYPE: ICD-10-CM

## 2023-05-09 PROCEDURE — 94729 DIFFUSING CAPACITY: CPT

## 2023-05-09 PROCEDURE — 94727 GAS DIL/WSHOT DETER LNG VOL: CPT

## 2023-05-09 PROCEDURE — 94010 BREATHING CAPACITY TEST: CPT

## 2023-05-09 RX ORDER — BENZONATATE 100 MG/1
100 CAPSULE ORAL 3 TIMES DAILY PRN
Status: ON HOLD | COMMUNITY
End: 2023-11-05 | Stop reason: HOSPADM

## 2023-05-09 RX ORDER — ASCORBIC ACID 125 MG
1 TABLET,CHEWABLE ORAL NIGHTLY PRN
COMMUNITY

## 2023-05-09 RX ORDER — ACETAMINOPHEN 500 MG
500 TABLET ORAL EVERY 6 HOURS PRN
COMMUNITY
End: 2023-06-13

## 2023-05-09 NOTE — DISCHARGE INSTRUCTIONS
To confirm, Your doctor has instructed you that surgery is scheduled for: Thursday 5/11/23    Endoscopy  will call the afternoon prior to surgery with the final arrival time.  Wednesday  5/10/23    Please report to Outpatient Registration the morning of surgery.     Do not eat or drink anything after midnight the night before your surgery - THIS INCLUDES  WATER, GUM, MINTS AND CANDY. Follow the preop given by the Doctor    YOU MAY BRUSH YOUR TEETH BUT DO NOT SWALLOW     TAKE ONLY THESE MEDICATIONS WITH A SMALL SIP OF WATER THE MORNING OF YOUR PROCEDURE: see medication list    ONLY if you are diabetic, check your sugar in the morning before your procedure.       Do not take any diabetic medicines or insulin the morning of surgery .     PLEASE NOTE:  The surgery schedule has many variables which may affect the time of your surgery case.  Family members should be available if your surgery time changes.  Plan to be here the day of your procedure between 2-3 hours.    DO NOT TAKE THESE MEDICATIONS 5-7 DAYS PRIOR to your procedure or per your surgeon's request: ASPIRIN, ALEVE, ADVIL, IBUPROFEN,  MELY SELTZER, BC , FISH OIL , VITAMIN E, HERBALS  (May take Tylenol)                                                   IMPORTANT INSTRUCTIONS    Do not smoke, vape or drink alcoholic beverages 24 hours prior to your procedure.  Shower the night before with  Dial antibacterial soap from the neck down.   You may use your own shampoo and face wash. This helps your skin to be as bacteria free as possible.    If you wear contact lenses, dentures, hearing aids or glasses, bring a container to put them in during surgery and give to a family member for safe keeping.    Please leave all jewelry, piercing's and valuables at home.   Make arrangements in advance for transportation home by a responsible adult.  You must make arrangements for transportation, TAXI'S, UBER'S OR LYFTS ARE NOT ALLOWED.        If you have any questions about these  instructions, call Pre-Op Admit  Nursing at [phone] or the Endoscopy Department at [phone]

## 2023-05-10 ENCOUNTER — HOSPITAL ENCOUNTER (OUTPATIENT)
Facility: AMBULARY SURGERY CENTER | Age: 83
Discharge: HOME | End: 2023-05-10
Attending: ANESTHESIOLOGY | Admitting: ANESTHESIOLOGY
Payer: MEDICARE

## 2023-05-10 ENCOUNTER — TELEPHONE (OUTPATIENT)
Dept: PULMONOLOGY | Facility: CLINIC | Age: 83
End: 2023-05-10

## 2023-05-10 DIAGNOSIS — M47.896 OTHER SPONDYLOSIS, LUMBAR REGION: ICD-10-CM

## 2023-05-10 LAB — POCT GLUCOSE: 101 MG/DL (ref 70–110)

## 2023-05-10 PROCEDURE — 64494 INJ PARAVERT F JNT L/S 2 LEV: CPT | Mod: RT | Performed by: ANESTHESIOLOGY

## 2023-05-10 PROCEDURE — 64493 INJ PARAVERT F JNT L/S 1 LEV: CPT | Mod: RT | Performed by: ANESTHESIOLOGY

## 2023-05-10 PROCEDURE — 64445 PR NERVE BLOCK INJ, ANES/STEROID, SCIATIC, INCL IMAG GUIDANCE: ICD-10-PCS | Mod: RT,,, | Performed by: ANESTHESIOLOGY

## 2023-05-10 PROCEDURE — 64445 NJX AA&/STRD SCIATIC NRV IMG: CPT | Mod: RT,,, | Performed by: ANESTHESIOLOGY

## 2023-05-10 PROCEDURE — 64451 NJX AA&/STRD NRV NRVTG SI JT: CPT | Mod: RT | Performed by: ANESTHESIOLOGY

## 2023-05-10 RX ORDER — MIDAZOLAM HYDROCHLORIDE 1 MG/ML
INJECTION INTRAMUSCULAR; INTRAVENOUS
Status: DISCONTINUED | OUTPATIENT
Start: 2023-05-10 | End: 2023-05-10 | Stop reason: HOSPADM

## 2023-05-10 RX ORDER — SODIUM CHLORIDE, SODIUM LACTATE, POTASSIUM CHLORIDE, CALCIUM CHLORIDE 600; 310; 30; 20 MG/100ML; MG/100ML; MG/100ML; MG/100ML
INJECTION, SOLUTION INTRAVENOUS ONCE AS NEEDED
Status: COMPLETED | OUTPATIENT
Start: 2023-05-10 | End: 2023-05-10

## 2023-05-10 RX ORDER — BUPIVACAINE HYDROCHLORIDE 5 MG/ML
INJECTION, SOLUTION EPIDURAL; INTRACAUDAL
Status: DISCONTINUED | OUTPATIENT
Start: 2023-05-10 | End: 2023-05-10 | Stop reason: HOSPADM

## 2023-05-10 RX ADMIN — SODIUM CHLORIDE, SODIUM LACTATE, POTASSIUM CHLORIDE, CALCIUM CHLORIDE: 600; 310; 30; 20 INJECTION, SOLUTION INTRAVENOUS at 11:05

## 2023-05-10 NOTE — OP NOTE
PROCEDURE DATE: 5/10/2023      PROCEDURE:  Right L5 medial branch nerve block and Right S1, S2 lateral branch nerve blocks under fluoroscopy      DIAGNOSIS:  Other lumbar spondylosis; Sacroiliac joint pain     Post Op diagnosis: Same     PHYSICIAN: Agustin Robles MD     MEDICATIONS INJECTED: 0.5% bupivicaine, 0.5 ml at each level     SEDATION MEDICATIONS: None     LOCAL ANESTHETIC USED: None     ESTIMATED BLOOD LOSS:  None     COMPLICATIONS:  None     TECHNIQUE: A time out was taken to identify the patient, procedure and side of the procedure. The patient was placed in a prone position, then prepped and draped in the usual sterile fashion using ChloraPrep and sterile towels.  The levels were determined under fluoroscopic guidance and then marked.  A 25-gauge 3.5 inch needle was introduced to the anatomic location of the right L5 medial branch nerve and right S1, S2 lateral branch nerves. The above medication was then injected. The patient tolerated the procedure well.     The patient was monitored after the procedure. Patient was given pain diary to record pain levels at home.      If found to have greater than a 50% recovery and so will be scheduled for a radiofrequency ablation of the corresponding nerves.  Patient was given post procedure and discharge instructions to

## 2023-05-10 NOTE — DISCHARGE SUMMARY
[facility]  Discharge Note  Short Stay    Procedure(s) (LRB):  Block-nerve-Lateral-branch-lumbar (Right)      OUTCOME: Patient tolerated treatment/procedure well without complication and is now ready for discharge.    DISPOSITION: Home or Self Care    FINAL DIAGNOSIS:  <principal problem not specified>    FOLLOWUP: In clinic    DISCHARGE INSTRUCTIONS:    Discharge Procedure Orders   Notify your health care provider if you experience any of the following:  temperature >100.4     Notify your health care provider if you experience any of the following:  severe uncontrolled pain     Notify your health care provider if you experience any of the following:  redness, tenderness, or signs of infection (pain, swelling, redness, odor or green/yellow discharge around incision site)     Activity as tolerated        TIME SPENT ON DISCHARGE: 30 minutes

## 2023-05-11 ENCOUNTER — HOSPITAL ENCOUNTER (OUTPATIENT)
Facility: HOSPITAL | Age: 83
Discharge: HOME | End: 2023-05-11
Attending: INTERNAL MEDICINE | Admitting: INTERNAL MEDICINE
Payer: MEDICARE

## 2023-05-11 ENCOUNTER — ANESTHESIA EVENT (OUTPATIENT)
Dept: SURGERY | Facility: HOSPITAL | Age: 83
End: 2023-05-11
Payer: MEDICARE

## 2023-05-11 ENCOUNTER — TELEPHONE (OUTPATIENT)
Dept: PAIN MEDICINE | Facility: CLINIC | Age: 83
End: 2023-05-11
Payer: MEDICARE

## 2023-05-11 ENCOUNTER — ANESTHESIA (OUTPATIENT)
Dept: SURGERY | Facility: HOSPITAL | Age: 83
End: 2023-05-11
Payer: MEDICARE

## 2023-05-11 VITALS
DIASTOLIC BLOOD PRESSURE: 60 MMHG | TEMPERATURE: 98 F | RESPIRATION RATE: 16 BRPM | SYSTOLIC BLOOD PRESSURE: 121 MMHG | OXYGEN SATURATION: 96 % | HEART RATE: 91 BPM

## 2023-05-11 DIAGNOSIS — M47.896 OTHER SPONDYLOSIS, LUMBAR REGION: Primary | ICD-10-CM

## 2023-05-11 DIAGNOSIS — J18.9 PNEUMONIA: ICD-10-CM

## 2023-05-11 LAB
KOH PREP SPEC: NORMAL

## 2023-05-11 PROCEDURE — 88305 TISSUE EXAM BY PATHOLOGIST: CPT | Mod: TC,59

## 2023-05-11 PROCEDURE — 31623 PR BRONCHOSCOPY,DIAGNOSTIC W BRUSH: ICD-10-PCS | Mod: 51,RT,, | Performed by: INTERNAL MEDICINE

## 2023-05-11 PROCEDURE — 31624 PR BRONCHOSCOPY,DIAG2STIC W LAVAGE: ICD-10-PCS | Mod: 51,RT,, | Performed by: INTERNAL MEDICINE

## 2023-05-11 PROCEDURE — 31624 DX BRONCHOSCOPE/LAVAGE: CPT | Mod: 51,RT,, | Performed by: INTERNAL MEDICINE

## 2023-05-11 PROCEDURE — 31623 DX BRONCHOSCOPE/BRUSH: CPT | Performed by: INTERNAL MEDICINE

## 2023-05-11 PROCEDURE — 87102 FUNGUS ISOLATION CULTURE: CPT | Mod: 59 | Performed by: INTERNAL MEDICINE

## 2023-05-11 PROCEDURE — 31628 PR BRONCHOSCOPY,TRANSBRONCH BIOPSY: ICD-10-PCS | Mod: RT,,, | Performed by: INTERNAL MEDICINE

## 2023-05-11 PROCEDURE — 63600175 PHARM REV CODE 636 W HCPCS: Performed by: NURSE ANESTHETIST, CERTIFIED REGISTERED

## 2023-05-11 PROCEDURE — 27000673 HC TUBING BLOOD Y: Performed by: ANESTHESIOLOGY

## 2023-05-11 PROCEDURE — 31628 BRONCHOSCOPY/LUNG BX EACH: CPT | Mod: RT,,, | Performed by: INTERNAL MEDICINE

## 2023-05-11 PROCEDURE — 25000242 PHARM REV CODE 250 ALT 637 W/ HCPCS: Performed by: INTERNAL MEDICINE

## 2023-05-11 PROCEDURE — 87070 CULTURE OTHR SPECIMN AEROBIC: CPT | Performed by: INTERNAL MEDICINE

## 2023-05-11 PROCEDURE — 37000008 HC ANESTHESIA 1ST 15 MINUTES: Performed by: INTERNAL MEDICINE

## 2023-05-11 PROCEDURE — 31624 DX BRONCHOSCOPE/LAVAGE: CPT | Performed by: INTERNAL MEDICINE

## 2023-05-11 PROCEDURE — D9220A PRA ANESTHESIA: Mod: ANES,,, | Performed by: ANESTHESIOLOGY

## 2023-05-11 PROCEDURE — 25000003 PHARM REV CODE 250: Performed by: INTERNAL MEDICINE

## 2023-05-11 PROCEDURE — 27200944 HC BRONCH FORCEPS DISPOSABLE: Performed by: INTERNAL MEDICINE

## 2023-05-11 PROCEDURE — 37000009 HC ANESTHESIA EA ADD 15 MINS: Performed by: INTERNAL MEDICINE

## 2023-05-11 PROCEDURE — 31623 DX BRONCHOSCOPE/BRUSH: CPT | Mod: 51,RT,, | Performed by: INTERNAL MEDICINE

## 2023-05-11 PROCEDURE — D9220A PRA ANESTHESIA: ICD-10-PCS | Mod: ANES,,, | Performed by: ANESTHESIOLOGY

## 2023-05-11 PROCEDURE — D9220A PRA ANESTHESIA: Mod: CRNA,,, | Performed by: NURSE ANESTHETIST, CERTIFIED REGISTERED

## 2023-05-11 PROCEDURE — 87210 SMEAR WET MOUNT SALINE/INK: CPT | Mod: 91 | Performed by: INTERNAL MEDICINE

## 2023-05-11 PROCEDURE — 27000675 HC TUBING MICRODRIP: Performed by: ANESTHESIOLOGY

## 2023-05-11 PROCEDURE — 27000671 HC TUBING MICROBORE EXT: Performed by: ANESTHESIOLOGY

## 2023-05-11 PROCEDURE — 87116 MYCOBACTERIA CULTURE: CPT | Mod: 59 | Performed by: INTERNAL MEDICINE

## 2023-05-11 PROCEDURE — D9220A PRA ANESTHESIA: ICD-10-PCS | Mod: CRNA,,, | Performed by: NURSE ANESTHETIST, CERTIFIED REGISTERED

## 2023-05-11 PROCEDURE — 87206 SMEAR FLUORESCENT/ACID STAI: CPT | Mod: 91 | Performed by: INTERNAL MEDICINE

## 2023-05-11 PROCEDURE — 27200946 HC BRUSH, CYTOLOGY: Performed by: INTERNAL MEDICINE

## 2023-05-11 PROCEDURE — 87205 SMEAR GRAM STAIN: CPT | Performed by: INTERNAL MEDICINE

## 2023-05-11 PROCEDURE — 31628 BRONCHOSCOPY/LUNG BX EACH: CPT | Performed by: INTERNAL MEDICINE

## 2023-05-11 RX ORDER — DEXAMETHASONE SODIUM PHOSPHATE 4 MG/ML
INJECTION, SOLUTION INTRA-ARTICULAR; INTRALESIONAL; INTRAMUSCULAR; INTRAVENOUS; SOFT TISSUE
Status: DISCONTINUED | OUTPATIENT
Start: 2023-05-11 | End: 2023-05-11

## 2023-05-11 RX ORDER — LIDOCAINE HYDROCHLORIDE 40 MG/ML
10 SOLUTION TOPICAL ONCE
Status: DISCONTINUED | OUTPATIENT
Start: 2023-05-11 | End: 2023-05-11 | Stop reason: HOSPADM

## 2023-05-11 RX ORDER — LIDOCAINE HYDROCHLORIDE 10 MG/ML
20 INJECTION INFILTRATION; PERINEURAL ONCE
Status: DISCONTINUED | OUTPATIENT
Start: 2023-05-11 | End: 2023-05-11 | Stop reason: HOSPADM

## 2023-05-11 RX ORDER — PROPOFOL 10 MG/ML
VIAL (ML) INTRAVENOUS CONTINUOUS PRN
Status: DISCONTINUED | OUTPATIENT
Start: 2023-05-11 | End: 2023-05-11

## 2023-05-11 RX ORDER — FENTANYL CITRATE 50 UG/ML
INJECTION, SOLUTION INTRAMUSCULAR; INTRAVENOUS
Status: DISCONTINUED | OUTPATIENT
Start: 2023-05-11 | End: 2023-05-11

## 2023-05-11 RX ORDER — LIDOCAINE HYDROCHLORIDE 40 MG/ML
5 INJECTION, SOLUTION RETROBULBAR ONCE
Status: COMPLETED | OUTPATIENT
Start: 2023-05-11 | End: 2023-05-11

## 2023-05-11 RX ORDER — ACETAMINOPHEN 10 MG/ML
INJECTION, SOLUTION INTRAVENOUS
Status: DISCONTINUED | OUTPATIENT
Start: 2023-05-11 | End: 2023-05-11

## 2023-05-11 RX ORDER — LIDOCAINE HYDROCHLORIDE 20 MG/ML
JELLY TOPICAL
Status: COMPLETED | OUTPATIENT
Start: 2023-05-11 | End: 2023-05-11

## 2023-05-11 RX ORDER — ONDANSETRON 2 MG/ML
INJECTION INTRAMUSCULAR; INTRAVENOUS
Status: DISCONTINUED | OUTPATIENT
Start: 2023-05-11 | End: 2023-05-11

## 2023-05-11 RX ORDER — ALBUTEROL SULFATE 0.83 MG/ML
2.5 SOLUTION RESPIRATORY (INHALATION) ONCE
Status: COMPLETED | OUTPATIENT
Start: 2023-05-11 | End: 2023-05-11

## 2023-05-11 RX ORDER — ALBUTEROL SULFATE 0.83 MG/ML
2.5 SOLUTION RESPIRATORY (INHALATION) ONCE
Status: DISCONTINUED | OUTPATIENT
Start: 2023-05-11 | End: 2023-05-11 | Stop reason: HOSPADM

## 2023-05-11 RX ORDER — LIDOCAINE HYDROCHLORIDE 20 MG/ML
JELLY TOPICAL
Status: DISCONTINUED | OUTPATIENT
Start: 2023-05-11 | End: 2023-05-11 | Stop reason: HOSPADM

## 2023-05-11 RX ADMIN — DEXAMETHASONE SODIUM PHOSPHATE 8 MG: 4 INJECTION, SOLUTION INTRAMUSCULAR; INTRAVENOUS at 08:05

## 2023-05-11 RX ADMIN — LIDOCAINE HYDROCHLORIDE 5 ML: 40 INJECTION, SOLUTION RETROBULBAR; TOPICAL at 07:05

## 2023-05-11 RX ADMIN — ALBUTEROL SULFATE 2.5 MG: 2.5 SOLUTION RESPIRATORY (INHALATION) at 07:05

## 2023-05-11 RX ADMIN — ONDANSETRON 4 MG: 2 INJECTION INTRAMUSCULAR; INTRAVENOUS at 07:05

## 2023-05-11 RX ADMIN — PROPOFOL 150 MCG/KG/MIN: 10 INJECTION, EMULSION INTRAVENOUS at 07:05

## 2023-05-11 RX ADMIN — ACETAMINOPHEN 1000 MG: 10 INJECTION, SOLUTION INTRAVENOUS at 08:05

## 2023-05-11 RX ADMIN — SODIUM CHLORIDE, SODIUM LACTATE, POTASSIUM CHLORIDE, AND CALCIUM CHLORIDE: .6; .31; .03; .02 INJECTION, SOLUTION INTRAVENOUS at 07:05

## 2023-05-11 RX ADMIN — FENTANYL CITRATE 50 MCG: 50 INJECTION, SOLUTION INTRAMUSCULAR; INTRAVENOUS at 07:05

## 2023-05-11 NOTE — PROCEDURES
Bronchoscopy    Indication:  Persistent dense right lower lobe posterior pneumonia since February   Medications:  Per anesthesia  Specimens:  Bronchial lavage, transbronchial brushes, transbronchial lung tissue biopsies  Complications:  None    The bronchoscope was introduced through the mouth as the scope would not pass through either the right or left nare. The hypopharynx and larynx were unremarkable. The vocal cords were midline and opposed well. The trachea was midline. The dom was sharp. The right upper lobe, right middle lobe, and right lower lobe subdivided into the usual subsegments. There was a long bronchus leading to the right lower lobe.  The right lower lobe basilar subsegments were very edematous at their orifice.  There was no erythema.  There was no mucus.  The left upper lobe and left lower lobe subdivided into the usual subsegments. There were no endobronchial or submucosal abnormalities noted.  The scope was wedged into the right lower lobe posterior subsegment and a lavage of 60 cc was instilled and 20 very clear fluid was returned.  This was followed by 2 transbronchial brushes and 9 transbronchial lung tissue biopsies with the fluoroscopy camera rotated 90° to assure posterior biopsies.  The specimens were sent for C and S, KOH and fungal culture, AFB and mycobacterial culture, cytology,and histopathology. A post procedure x-ray has been ordered. The patient tolerated the procedure well.

## 2023-05-11 NOTE — TRANSFER OF CARE
Anesthesia Transfer of Care Note    Patient: Alexa Otero    Procedure(s) Performed: Procedure(s) (LRB):  BRONCHOSCOPY, WITH FLUOROSCOPY (Right)    Patient location: GI    Anesthesia Type: general    Transport from OR: Transported from OR on 2-3 L/min O2 by NC with adequate spontaneous ventilation    Post pain: adequate analgesia    Post assessment: no apparent anesthetic complications    Post vital signs: stable    Level of consciousness: awake and alert    Nausea/Vomiting: no nausea/vomiting    Complications: none    Transfer of care protocol was followed      Last vitals:   Visit Vitals  /76   Pulse 99   Temp 36.6 °C (97.9 °F)   Resp 16   SpO2 100%      PROGRESS NOTE:   Authored by Luz Marina Verde MD  Pager: [facility] [phone]; [facility] [phone]    Patient is a 63y old  Male who presents with a chief complaint of neck pain (18 Apr 2021 07:20)      SUBJECTIVE / OVERNIGHT EVENTS:  NAEON. Patient continue to refuse medications. To complete decadron course tonight. Pending hospice.     MEDICATIONS  (STANDING):  benztropine 1 milliGRAM(s) Oral two times a day  cholecalciferol 1000 Unit(s) Oral daily  dextrose 40% Gel 15 Gram(s) Oral once  dextrose 50% Injectable 25 Gram(s) IV Push once  glucagon  Injectable 1 milliGRAM(s) IntraMuscular once  haloperidol     Tablet 10 milliGRAM(s) Oral two times a day  heparin   Injectable 5000 Unit(s) SubCutaneous every 12 hours  insulin lispro (ADMELOG) corrective regimen sliding scale   SubCutaneous three times a day before meals  insulin lispro (ADMELOG) corrective regimen sliding scale   SubCutaneous at bedtime  nicotine - 21 mG/24Hr(s) Patch 1 patch Transdermal daily  OLANZapine 20 milliGRAM(s) Oral every 12 hours  traZODone 100 milliGRAM(s) Oral at bedtime    MEDICATIONS  (PRN):  acetaminophen   Tablet .. 650 milliGRAM(s) Oral every 6 hours PRN Temp greater or equal to 38C (100.4F), Mild Pain (1 - 3), Moderate Pain (4 - 6)  haloperidol    Injectable 2 milliGRAM(s) IntraMuscular every 6 hours PRN Agitation  lidocaine   Patch 1 Patch Transdermal daily PRN pain  nicotine  Polacrilex Gum 2 milliGRAM(s) Oral every 2 hours PRN smoking cessation  polyethylene glycol 3350 17 Gram(s) Oral daily PRN Constipation  senna 2 Tablet(s) Oral at bedtime PRN Constipation      CAPILLARY BLOOD GLUCOSE        I&O's Summary      PHYSICAL EXAM:  Vital Signs Last 24 Hrs  T(C): 36.8 (19 Apr 2021 06:28), Max: 36.8 (19 Apr 2021 06:28)  T(F): 98.3 (19 Apr 2021 06:28), Max: 98.3 (19 Apr 2021 06:28)  HR: 91 (19 Apr 2021 06:28) (91 - 91)  BP: 132/93 (19 Apr 2021 06:28) (132/93 - 132/93)  BP(mean): --  RR: 20 (19 Apr 2021 06:28) (20 - 20)  SpO2: 96% (19 Apr 2021 06:28) (96% - 96%)    General: NAD  HEENT: NC/AT; PERRL, clear conjunctiva  Respiratory: tachypneic, auscultation of anterior fields: decreased breath sounds on left, scattered fine crackles on right  Cardiovascular: regular rate, no m/r/g  Abdomen: no ttp, bowel sounds x4  Extremities: no edema  Skin: normal color and turgor; no rash    LABS:                      RADIOLOGY & ADDITIONAL TESTS:  Results Reviewed:   Imaging Personally Reviewed:  Electrocardiogram Personally Reviewed:    COORDINATION OF CARE:  Care Discussed with Consultants/Other Providers [Y/N]:  Prior or Outpatient Records Reviewed [Y/N]:   PROGRESS NOTE:   Authored by Luz Marina Verde MD  Pager: [facility] [phone]; [facility] [phone]    Patient is a 63y old  Male who presents with a chief complaint of neck pain (18 Apr 2021 07:20)      SUBJECTIVE / OVERNIGHT EVENTS:  NAEON. Patient continue to refuse medications. Completed decadron course. Pending hospice.     MEDICATIONS  (STANDING):  benztropine 1 milliGRAM(s) Oral two times a day  cholecalciferol 1000 Unit(s) Oral daily  dextrose 40% Gel 15 Gram(s) Oral once  dextrose 50% Injectable 25 Gram(s) IV Push once  glucagon  Injectable 1 milliGRAM(s) IntraMuscular once  haloperidol     Tablet 10 milliGRAM(s) Oral two times a day  heparin   Injectable 5000 Unit(s) SubCutaneous every 12 hours  insulin lispro (ADMELOG) corrective regimen sliding scale   SubCutaneous three times a day before meals  insulin lispro (ADMELOG) corrective regimen sliding scale   SubCutaneous at bedtime  nicotine - 21 mG/24Hr(s) Patch 1 patch Transdermal daily  OLANZapine 20 milliGRAM(s) Oral every 12 hours  traZODone 100 milliGRAM(s) Oral at bedtime    MEDICATIONS  (PRN):  acetaminophen   Tablet .. 650 milliGRAM(s) Oral every 6 hours PRN Temp greater or equal to 38C (100.4F), Mild Pain (1 - 3), Moderate Pain (4 - 6)  haloperidol    Injectable 2 milliGRAM(s) IntraMuscular every 6 hours PRN Agitation  lidocaine   Patch 1 Patch Transdermal daily PRN pain  nicotine  Polacrilex Gum 2 milliGRAM(s) Oral every 2 hours PRN smoking cessation  polyethylene glycol 3350 17 Gram(s) Oral daily PRN Constipation  senna 2 Tablet(s) Oral at bedtime PRN Constipation      CAPILLARY BLOOD GLUCOSE        I&O's Summary      PHYSICAL EXAM:  Vital Signs Last 24 Hrs  T(C): 36.8 (19 Apr 2021 06:28), Max: 36.8 (19 Apr 2021 06:28)  T(F): 98.3 (19 Apr 2021 06:28), Max: 98.3 (19 Apr 2021 06:28)  HR: 91 (19 Apr 2021 06:28) (91 - 91)  BP: 132/93 (19 Apr 2021 06:28) (132/93 - 132/93)  BP(mean): --  RR: 20 (19 Apr 2021 06:28) (20 - 20)  SpO2: 96% (19 Apr 2021 06:28) (96% - 96%)    General: NAD  HEENT: NC/AT; PERRL, clear conjunctiva  Respiratory: tachypneic, auscultation of anterior fields: decreased breath sounds on left, scattered fine crackles on right  Cardiovascular: regular rate, no m/r/g  Abdomen: no ttp, bowel sounds x4  Extremities: no edema  Skin: normal color and turgor; no rash    LABS:                      RADIOLOGY & ADDITIONAL TESTS:  Results Reviewed:   Imaging Personally Reviewed:  Electrocardiogram Personally Reviewed:    COORDINATION OF CARE:  Care Discussed with Consultants/Other Providers [Y/N]:  Prior or Outpatient Records Reviewed [Y/N]:   PROGRESS NOTE:   Authored by Luz Marina Verde MD  Pager: [facility] [phone]; [facility] [phone]    Patient is a 63y old  Male who presents with a chief complaint of neck pain (18 Apr 2021 07:20)      SUBJECTIVE / OVERNIGHT EVENTS:  NAEON. Patient continued to refuse medications overnight.  This AM,  resting in bed, alert and oriented to self and place. Completed decadron course. Pending hospice.     MEDICATIONS  (STANDING):  benztropine 1 milliGRAM(s) Oral two times a day  cholecalciferol 1000 Unit(s) Oral daily  dextrose 40% Gel 15 Gram(s) Oral once  dextrose 50% Injectable 25 Gram(s) IV Push once  glucagon  Injectable 1 milliGRAM(s) IntraMuscular once  haloperidol     Tablet 10 milliGRAM(s) Oral two times a day  heparin   Injectable 5000 Unit(s) SubCutaneous every 12 hours  insulin lispro (ADMELOG) corrective regimen sliding scale   SubCutaneous three times a day before meals  insulin lispro (ADMELOG) corrective regimen sliding scale   SubCutaneous at bedtime  nicotine - 21 mG/24Hr(s) Patch 1 patch Transdermal daily  OLANZapine 20 milliGRAM(s) Oral every 12 hours  traZODone 100 milliGRAM(s) Oral at bedtime    MEDICATIONS  (PRN):  acetaminophen   Tablet .. 650 milliGRAM(s) Oral every 6 hours PRN Temp greater or equal to 38C (100.4F), Mild Pain (1 - 3), Moderate Pain (4 - 6)  haloperidol    Injectable 2 milliGRAM(s) IntraMuscular every 6 hours PRN Agitation  lidocaine   Patch 1 Patch Transdermal daily PRN pain  nicotine  Polacrilex Gum 2 milliGRAM(s) Oral every 2 hours PRN smoking cessation  polyethylene glycol 3350 17 Gram(s) Oral daily PRN Constipation  senna 2 Tablet(s) Oral at bedtime PRN Constipation      CAPILLARY BLOOD GLUCOSE        I&O's Summary      PHYSICAL EXAM:  Vital Signs Last 24 Hrs  T(C): 36.8 (19 Apr 2021 06:28), Max: 36.8 (19 Apr 2021 06:28)  T(F): 98.3 (19 Apr 2021 06:28), Max: 98.3 (19 Apr 2021 06:28)  HR: 91 (19 Apr 2021 06:28) (91 - 91)  BP: 132/93 (19 Apr 2021 06:28) (132/93 - 132/93)  BP(mean): --  RR: 20 (19 Apr 2021 06:28) (20 - 20)  SpO2: 96% (19 Apr 2021 06:28) (96% - 96%)    General: NAD  HEENT: NC/AT; PERRL, clear conjunctiva  Respiratory: tachypneic, auscultation of anterior fields: decreased breath sounds on left, scattered fine crackles on right  Cardiovascular: regular rate, no m/r/g  Abdomen: no ttp, bowel sounds x4  Extremities: no edema  Skin: normal color and turgor; no rash    LABS:                      RADIOLOGY & ADDITIONAL TESTS:  Results Reviewed:   Imaging Personally Reviewed:  Electrocardiogram Personally Reviewed:    COORDINATION OF CARE:  Care Discussed with Consultants/Other Providers [Y/N]:  Prior or Outpatient Records Reviewed [Y/N]:

## 2023-05-11 NOTE — ANESTHESIA POSTPROCEDURE EVALUATION
Anesthesia Post Evaluation    Patient: Alexa Otero    Procedure(s) Performed: Procedure(s) (LRB):  BRONCHOSCOPY, WITH FLUOROSCOPY (Right)    Final Anesthesia Type: general      Patient location during evaluation: GI PACU  Patient participation: Yes- Able to Participate  Level of consciousness: awake and alert and oriented  Post-procedure vital signs: reviewed and stable  Pain management: adequate  Airway patency: patent    PONV status at discharge: No PONV  Anesthetic complications: no      Cardiovascular status: blood pressure returned to baseline, hemodynamically stable and stable  Respiratory status: unassisted, spontaneous ventilation and room air  Hydration status: euvolemic  Follow-up not needed.          Vitals Value Taken Time   /54 05/11/23 0925   Temp 36.6 °C (97.9 °F) 05/11/23 0900   Pulse 92 05/11/23 0928   Resp 16 05/11/23 0915   SpO2 97 % 05/11/23 0928   Vitals shown include unvalidated device data.      No case tracking events are documented in the log.      Pain/Jess Score: Modified Jess Score: 20 (5/10/2023 12:40 PM)

## 2023-05-11 NOTE — ANESTHESIA PREPROCEDURE EVALUATION
05/11/2023  Alexa Otero is a 83 y.o., female.    Pre-op Assessment    I have reviewed the Patient Summary Reports.    I have reviewed the Nursing Notes. I have reviewed the NPO Status.   I have reviewed the Medications.     Review of Systems  Anesthesia Hx:  Denies Family Hx of Anesthesia complications.  Personal Hx of Anesthesia complications, Post-Operative Nausea/Vomiting, in the past, but not with recent anesthetics / prophylaxis   Social:  Former Smoker, Social Alcohol Use    Hematology/Oncology:         -- Anemia: Current/Recent Cancer. (Ovarian) chemotherapy   EENT/Dental:  EENT/Dental Normal Macular degeneration.  Crowns.   Cardiovascular:   Hypertension, well controlled hyperlipidemia    Pulmonary:   Pneumonia (possible aspiration) Chronic cough with clear sputum   Renal/:  Renal/ Normal     Hepatic/GI:   GERD, well controlled Liver Disease, (Herpetic cyst, no history of liver dysfunction.)    Musculoskeletal:   Arthritis ( Osteoarthritis)     Neurological:   Peripheral Neuropathy ( feet, due to chemotherapy)    Endocrine:   Diabetes, well controlled, type 2    Psych:  Psychiatric Normal           Physical Exam  General:  Well nourished      Airway/Jaw/Neck:  Airway Findings: Mouth Opening: Normal   Tongue: Normal   Mallampati: I TM Distance: Normal, at least 6 cm   Jaw/Neck Findings:  Neck ROM: Normal ROM       Dental:  Dental Findings: In tact     Chest/Lungs:  Chest/Lungs Findings: Clear to auscultation, Normal Respiratory Rate      Heart/Vascular:  Heart Findings: Rate: Normal  Rhythm: Regular Rhythm  Sounds: Normal  Heart murmur: negative       Mental Status:  Mental Status Findings:  Cooperative, Alert and Oriented         Anesthesia Plan  Type of Anesthesia, risks & benefits discussed:  Anesthesia Type:  Gen Natural Airway    Patient's Preference:   Plan Factors:          Intra-op  Monitoring Plan: standard ASA monitors  Intra-op Monitoring Plan Comments:   Post Op Pain Control Plan: IV/PO Opioids PRN and multimodal analgesia  Post Op Pain Control Plan Comments:     Induction:   IV  Beta Blocker:         Informed Consent: Informed consent signed with the Patient and all parties understand the risks and agree with anesthesia plan.  All questions answered.  Anesthesia consent signed with patient.  ASA Score: 2     Day of Surgery Review of History & Physical:        Anesthesia Plan Notes: Multimodal analgesia with ofirmev 1000mg, decadron 8 mg.          Ready For Surgery From Anesthesia Perspective.           Physical Exam  General: Well nourished    Airway:  Mallampati: I   Mouth Opening: Normal  TM Distance: Normal, at least 6 cm  Tongue: Normal  Neck ROM: Normal ROM    Dental:  In tact    Chest/Lungs:  Clear to auscultation, Normal Respiratory Rate    Heart:  Rate: Normal  Rhythm: Regular Rhythm  Sounds: Normal          Anesthesia Plan  Type of Anesthesia, risks & benefits discussed:    Anesthesia Type: Gen Natural Airway  Intra-op Monitoring Plan: standard ASA monitors  Post Op Pain Control Plan: IV/PO Opioids PRN and multimodal analgesia  Induction:  IV  Informed Consent: Informed consent signed with the Patient and all parties understand the risks and agree with anesthesia plan.  All questions answered.   ASA Score: 2  Anesthesia Plan Notes: Multimodal analgesia with ofirmev 1000mg, decadron 8 mg.      Ready For Surgery From Anesthesia Perspective.       .

## 2023-05-11 NOTE — H&P
SUBJECTIVE:    Patient ID: Alexa Otero is a 83 y.o. female.    Chief Complaint: No chief complaint on file.    HPI The patient is here not eating or drinking because her antibiotics are making her sick.  She is on Augmentin and doxycycline.  She vomited as recently as last night.  She has been taking it on an empty stomach. She is not short of breath.  Her cough is better with Tessalon perles.  She has Kwon's esophagitis and eats during the night and sleeeps on her R side.  She is producing clear mucus.  She has a persisting right lower lobe dense pneumonia which was seen on her CT in February and then again in March she does not perceive she has reflux but has Kwon's esophagitis; she eats peppermints to help her cough.          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    precancer of cervix     Past Surgical History:   Procedure Laterality Date    AUGMENTATION OF BREAST      CATARACT EXTRACTION BILATERAL W/ ANTERIOR VITRECTOMY Bilateral     CHOLECYSTECTOMY      COLONOSCOPY      COLONOSCOPY N/A 04/20/2023    Procedure: COLONOSCOPY;  Surgeon: Sabino Stephenson MD;  Location: [facility];  Service: Endoscopy;  Laterality: N/A;    ESOPHAGOGASTRODUODENOSCOPY N/A 06/20/2018    Procedure: EGD (ESOPHAGOGASTRODUODENOSCOPY);  Surgeon: Sabino Stephenson MD;  Location: [facility];  Service: Endoscopy;  Laterality: N/A;    ESOPHAGOGASTRODUODENOSCOPY N/A 03/31/2023    Procedure: EGD (ESOPHAGOGASTRODUODENOSCOPY);  Surgeon: Sabino Stephenson MD;  Location: [facility];  Service: Endoscopy;  Laterality: N/A;    HYSTERECTOMY  1976    partial     INJECTION OF ANESTHETIC AGENT AROUND MEDIAL BRANCH NERVES INNERVATING LUMBAR FACET JOINT Right 5/10/2023    Procedure: Block-nerve-Lateral-branch-lumbar;  Surgeon: Agustin Robles MD;  Location: [facility] OR;  Service: Pain Management;  Laterality: Right;  L5 and s1,s2 LBB    INJECTION, SACROILIAC JOINT Right 01/26/2023    Procedure: INJECTION,SACROILIAC JOINT;  Surgeon: Agustin Robles MD;  Location: [facility] OR;  Service: Pain Management;  Laterality: Right;    INSERTION OF TUNNELED CENTRAL VENOUS CATHETER (CVC) WITH SUBCUTANEOUS PORT Right 10/19/2020    Procedure: INSERTION, PORT-A-CATH;  Surgeon: Misti Mcfarland MD;  Location: [facility] OR;  Service: General;  Laterality: Right;    INTRAMEDULLARY RODDING OF TROCHANTER OF FEMUR Right 11/28/2021    Procedure: INSERTION, INTRAMEDULLARY LUC, FEMUR, TROCHANTER/RIGHT TFN DR FAJARDO NOTIFIED REP;  Surgeon: Kp Fajardo MD;  Location: [facility] OR;  Service: Orthopedics;  Laterality: Right;  SKIP    right hip fracture Right     hardware down to thigh    ROBOT-ASSISTED LAPAROSCOPIC LYMPHADENECTOMY USING DA NELLA XI N/A 09/21/2020    Procedure: XI ROBOTIC LYMPHADENECTOMY-pelvic and kell-aortic;  Surgeon: Altagracia Ray MD;  Location: [facility] OR;  Service: OB/GYN;  Laterality: N/A;    ROBOT-ASSISTED LAPAROSCOPIC OMENTECTOMY USING DA NELLA XI N/A 09/21/2020    Procedure: XI ROBOTIC OMENTECTOMY;  Surgeon: Altagracia Ray MD;  Location: [facility] OR;  Service: OB/GYN;  Laterality: N/A;    ROBOT-ASSISTED LAPAROSCOPIC SALPINGO-OOPHORECTOMY USING DA NELLA XI Bilateral 09/21/2020    Procedure: XI ROBOTIC SALPINGO-OOPHORECTOMY;  Surgeon: Altagracia Ray MD;  Location: [facility] OR;  Service: OB/GYN;  Laterality: Bilateral;    UPPER GASTROINTESTINAL ENDOSCOPY       Family History   Problem Relation Age of Onset    Cancer Mother 57        lung    Cancer Father 56        oral    Skin cancer Sister     Skin cancer Brother     Melanoma Brother     Skin cancer Brother     Breast cancer Maternal  Grandmother     Breast cancer Paternal Grandmother     Colon polyps Daughter     Psoriasis Neg Hx     Lupus Neg Hx     Eczema Neg Hx     Colon cancer Neg Hx     Crohn's disease Neg Hx     Esophageal cancer Neg Hx     Stomach cancer Neg Hx     Ulcerative colitis Neg Hx         Social History:   Marital Status:   Occupation: Data Unavailable  Alcohol History:  reports current alcohol use of about 1.0 standard drink per week.  Tobacco History:  reports that she quit smoking about 42 years ago. Her smoking use included cigarettes. She has a 20.00 pack-year smoking history. She has never used smokeless tobacco.  Drug History:  reports no history of drug use.    Review of patient's allergies indicates:  No Known Allergies    Current Facility-Administered Medications   Medication Dose Route Frequency Provider Last Rate Last Admin    LIDOcaine HCl 2% urojet    PRN Neva Christian MD   3 mL at 05/11/23 0746     Facility-Administered Medications Ordered in Other Encounters   Medication Dose Route Frequency Provider Last Rate Last Admin    fentaNYL injection   Intravenous PRN David Whittington CRNA   50 mcg at 05/11/23 0736    lactated ringers infusion   Intravenous Continuous PRN David Whittington CRNA   New Bag at 05/11/23 0730    ondansetron injection   Intravenous PRN David Whittington CRNA   4 mg at 05/11/23 0736    propofol (DIPRIVAN) 10 mg/mL infusion   Intravenous Continuous PRN David Whittington CRNA   Stopped at 05/11/23 0807       Alpha-1 Antitrypsin:  Last PFT:   Last CT:3/18/23  IMPRESSION:  No CT evidence for pulmonary embolism.     Focal area of airspace opacity/consolidation involving the posterior segment right lower lobe no significantly changed when compared to prior study.   Emphysematous changes.   Prominent right hilar and subcarinal lymph nodes, may be reactive in nature.   Inferior deformity with minimal increase sclerosis at T11 perhaps suggesting old compression deformity.    Review of  Systems  General: Feeling tired and shaky.  Eyes: Vision is good. She has macular degeneration.  ENT:  No sinusitis or pharyngitis.   Heart:: No chest pain or palpitations.  Lungs: Coughs, produces clear mucus.  Her cough keeps her  up at night  GI: nausea vomiting and diarrhea from antibiotics, she has silent reflux  : No dysuria, hesitancy, or nocturia.  Musculoskeletal: has R hip pain  Skin: No lesions or rashes.  Neuro: No headaches or neuropathy.  Lymph: No edema or adenopathy.  Psych: No anxiety or depression.  Endo: Weight loss 7-8 pounds down    OBJECTIVE:      /76   Pulse 99   Temp 97.9 °F (36.6 °C)   Resp 16   SpO2 100%     Physical Exam  GENERAL: Older patient in no distress.  HEENT: Pupils equal and reactive. Extraocular movements intact. Nose intact.      Pharynx moist.  NECK: Supple.   HEART: Regular rate and rhythm. No murmur or gallop auscultated.  LUNGS:  Dense crackles in the right base.. Lung excursion symmetrical. No change in fremitus.  ABDOMEN: Bowel sounds present. Non-tender, no masses palpated.  EXTREMITIES: Normal muscle tone and joint movement, no cyanosis or clubbing.   LYMPHATICS: No adenopathy palpated, no edema.  SKIN: Dry, intact, no lesions.   NEURO: Cranial nerves II-XII intact. Motor strength 5/5 bilaterally, upper and lower extremities.  PSYCH: Appropriate affect.    Assessment:       1. Pneumonia          Plan:       Pneumonia    Other orders  -     FL Flouro Usage; Standing  -     albuterol nebulizer solution 2.5 mg  -     LIDOcaine (PF) 40 mg/mL (4 %) injection 5 mL  -     Vital signs; Standing  -     Verify informed consent; Standing  -     Assess per unit routine; Standing  -     Insert peripheral IV if not present; Standing  -     Remove glasses and/or dentures; Standing  -     Undress from the waist up; Standing  -     Transport patient on stretcher with oxygen available; Standing  -     Notify Physician; Standing  -     Pulse Oximetry Q4H; Standing  -      Full code; Standing  -     Place in Outpatient; Standing  -     Advance diet as tolerated; Standing  -     Discontinue IV; Standing  -     DISCHARGE PATIENT ATTENTION: Patient requires surgical mask upon discharge.; Standing  -     LIDOcaine HCl 2% urojet         No follow-ups on file.  Head of bed up 4 inches  Continue omeprazole  No caffeine, chocolate, or peppermints in the evenings  Stop antibiotics  Will need to workup  with PFTs after she is over this pneumonia

## 2023-05-12 VITALS
BODY MASS INDEX: 21.95 KG/M2 | RESPIRATION RATE: 20 BRPM | WEIGHT: 123.88 LBS | SYSTOLIC BLOOD PRESSURE: 102 MMHG | TEMPERATURE: 98 F | OXYGEN SATURATION: 92 % | HEART RATE: 87 BPM | DIASTOLIC BLOOD PRESSURE: 61 MMHG | HEIGHT: 63 IN

## 2023-05-12 NOTE — TELEPHONE ENCOUNTER
Spoke with pt and she states she had 90% relief for at least 2 hours schedule second block for 05/30/2023

## 2023-05-14 LAB
BACTERIA SPEC AEROBE CULT: NORMAL
GRAM STN SPEC: NORMAL

## 2023-05-15 ENCOUNTER — TELEPHONE (OUTPATIENT)
Dept: PULMONOLOGY | Facility: HOSPITAL | Age: 83
End: 2023-05-15

## 2023-05-15 NOTE — TELEPHONE ENCOUNTER
The patient's bronchoscopy shows neutrophils and degenerating debris no malignancies the alveolar lung parenchyma shows reactive type 2 new most light hyperplasia and bronchial  and bronchiolar walls with no significant histopathologic findings.    Discussed that this is consistent with aspiration.  Discuss the need for her to sleep with the head of her bed elevated all the time and not to eat for at least 3 hours before going to bed.  She is still coughing up great quantities of clear mucus.  Will repeat her chest x-ray in 2 months and see what it looks like.

## 2023-05-16 ENCOUNTER — OFFICE VISIT (OUTPATIENT)
Dept: FAMILY MEDICINE | Facility: CLINIC | Age: 83
End: 2023-05-16
Payer: MEDICARE

## 2023-05-16 VITALS
RESPIRATION RATE: 16 BRPM | SYSTOLIC BLOOD PRESSURE: 130 MMHG | HEIGHT: 63 IN | WEIGHT: 122.38 LBS | OXYGEN SATURATION: 96 % | HEART RATE: 87 BPM | TEMPERATURE: 98 F | BODY MASS INDEX: 21.68 KG/M2 | DIASTOLIC BLOOD PRESSURE: 60 MMHG

## 2023-05-16 DIAGNOSIS — J69.0 ASPIRATION PNEUMONIA, UNSPECIFIED ASPIRATION PNEUMONIA TYPE, UNSPECIFIED LATERALITY, UNSPECIFIED PART OF LUNG: ICD-10-CM

## 2023-05-16 DIAGNOSIS — G89.29 CHRONIC LOW BACK PAIN, UNSPECIFIED BACK PAIN LATERALITY, UNSPECIFIED WHETHER SCIATICA PRESENT: ICD-10-CM

## 2023-05-16 DIAGNOSIS — E11.69 HYPERLIPIDEMIA ASSOCIATED WITH TYPE 2 DIABETES MELLITUS: ICD-10-CM

## 2023-05-16 DIAGNOSIS — C56.1 CARCINOMA OF RIGHT OVARY: ICD-10-CM

## 2023-05-16 DIAGNOSIS — E55.9 VITAMIN D DEFICIENCY DISEASE: ICD-10-CM

## 2023-05-16 DIAGNOSIS — E78.5 HYPERLIPIDEMIA ASSOCIATED WITH TYPE 2 DIABETES MELLITUS: ICD-10-CM

## 2023-05-16 DIAGNOSIS — I15.2 HYPERTENSION ASSOCIATED WITH DIABETES: Primary | ICD-10-CM

## 2023-05-16 DIAGNOSIS — Z86.010 HISTORY OF COLON POLYPS: ICD-10-CM

## 2023-05-16 DIAGNOSIS — D75.1 POLYCYTHEMIA, SECONDARY: ICD-10-CM

## 2023-05-16 DIAGNOSIS — E11.22 CONTROLLED TYPE 2 DIABETES MELLITUS WITH STAGE 3 CHRONIC KIDNEY DISEASE, WITHOUT LONG-TERM CURRENT USE OF INSULIN: ICD-10-CM

## 2023-05-16 DIAGNOSIS — E11.59 HYPERTENSION ASSOCIATED WITH DIABETES: Primary | ICD-10-CM

## 2023-05-16 DIAGNOSIS — I70.0 ATHEROSCLEROSIS OF AORTA: ICD-10-CM

## 2023-05-16 DIAGNOSIS — K21.9 GASTROESOPHAGEAL REFLUX DISEASE, UNSPECIFIED WHETHER ESOPHAGITIS PRESENT: ICD-10-CM

## 2023-05-16 DIAGNOSIS — E11.9 ENCOUNTER FOR DIABETIC FOOT EXAM: ICD-10-CM

## 2023-05-16 DIAGNOSIS — J44.9 CHRONIC OBSTRUCTIVE PULMONARY DISEASE, UNSPECIFIED COPD TYPE: ICD-10-CM

## 2023-05-16 DIAGNOSIS — N18.30 CONTROLLED TYPE 2 DIABETES MELLITUS WITH STAGE 3 CHRONIC KIDNEY DISEASE, WITHOUT LONG-TERM CURRENT USE OF INSULIN: ICD-10-CM

## 2023-05-16 DIAGNOSIS — M54.50 CHRONIC LOW BACK PAIN, UNSPECIFIED BACK PAIN LATERALITY, UNSPECIFIED WHETHER SCIATICA PRESENT: ICD-10-CM

## 2023-05-16 PROBLEM — Z86.0100 HISTORY OF COLON POLYPS: Status: ACTIVE | Noted: 2023-05-16

## 2023-05-16 PROCEDURE — 1159F PR MEDICATION LIST DOCUMENTED IN MEDICAL RECORD: ICD-10-PCS | Mod: CPTII,,, | Performed by: FAMILY MEDICINE

## 2023-05-16 PROCEDURE — 1157F ADVNC CARE PLAN IN RCRD: CPT | Mod: CPTII,,, | Performed by: FAMILY MEDICINE

## 2023-05-16 PROCEDURE — 99999 PR PBB SHADOW E&M-EST. PATIENT-LVL IV: CPT | Mod: PBBFAC,,, | Performed by: FAMILY MEDICINE

## 2023-05-16 PROCEDURE — 1157F PR ADVANCE CARE PLAN OR EQUIV PRESENT IN MEDICAL RECORD: ICD-10-PCS | Mod: CPTII,,, | Performed by: FAMILY MEDICINE

## 2023-05-16 PROCEDURE — 1159F MED LIST DOCD IN RCRD: CPT | Mod: CPTII,,, | Performed by: FAMILY MEDICINE

## 2023-05-16 PROCEDURE — 3078F DIAST BP <80 MM HG: CPT | Mod: CPTII,,, | Performed by: FAMILY MEDICINE

## 2023-05-16 PROCEDURE — 3078F PR MOST RECENT DIASTOLIC BLOOD PRESSURE < 80 MM HG: ICD-10-PCS | Mod: CPTII,,, | Performed by: FAMILY MEDICINE

## 2023-05-16 PROCEDURE — 1160F RVW MEDS BY RX/DR IN RCRD: CPT | Mod: CPTII,,, | Performed by: FAMILY MEDICINE

## 2023-05-16 PROCEDURE — 99999 PR PBB SHADOW E&M-EST. PATIENT-LVL IV: ICD-10-PCS | Mod: PBBFAC,,, | Performed by: FAMILY MEDICINE

## 2023-05-16 PROCEDURE — 1126F AMNT PAIN NOTED NONE PRSNT: CPT | Mod: CPTII,,, | Performed by: FAMILY MEDICINE

## 2023-05-16 PROCEDURE — 3075F PR MOST RECENT SYSTOLIC BLOOD PRESS GE 130-139MM HG: ICD-10-PCS | Mod: CPTII,,, | Performed by: FAMILY MEDICINE

## 2023-05-16 PROCEDURE — 1126F PR PAIN SEVERITY QUANTIFIED, NO PAIN PRESENT: ICD-10-PCS | Mod: CPTII,,, | Performed by: FAMILY MEDICINE

## 2023-05-16 PROCEDURE — 1160F PR REVIEW ALL MEDS BY PRESCRIBER/CLIN PHARMACIST DOCUMENTED: ICD-10-PCS | Mod: CPTII,,, | Performed by: FAMILY MEDICINE

## 2023-05-16 PROCEDURE — 99214 PR OFFICE/OUTPT VISIT, EST, LEVL IV, 30-39 MIN: ICD-10-PCS | Mod: ,,, | Performed by: FAMILY MEDICINE

## 2023-05-16 PROCEDURE — 3075F SYST BP GE 130 - 139MM HG: CPT | Mod: CPTII,,, | Performed by: FAMILY MEDICINE

## 2023-05-16 PROCEDURE — 3288F PR FALLS RISK ASSESSMENT DOCUMENTED: ICD-10-PCS | Mod: CPTII,,, | Performed by: FAMILY MEDICINE

## 2023-05-16 PROCEDURE — 99214 OFFICE O/P EST MOD 30 MIN: CPT | Mod: ,,, | Performed by: FAMILY MEDICINE

## 2023-05-16 PROCEDURE — 1101F PR PT FALLS ASSESS DOC 0-1 FALLS W/OUT INJ PAST YR: ICD-10-PCS | Mod: CPTII,,, | Performed by: FAMILY MEDICINE

## 2023-05-16 PROCEDURE — 3288F FALL RISK ASSESSMENT DOCD: CPT | Mod: CPTII,,, | Performed by: FAMILY MEDICINE

## 2023-05-16 PROCEDURE — 1101F PT FALLS ASSESS-DOCD LE1/YR: CPT | Mod: CPTII,,, | Performed by: FAMILY MEDICINE

## 2023-05-16 RX ORDER — FLUTICASONE FUROATE AND VILANTEROL 100; 25 UG/1; UG/1
1 POWDER RESPIRATORY (INHALATION) DAILY
Qty: 90 EACH | Refills: 3 | Status: SHIPPED | OUTPATIENT
Start: 2023-05-16 | End: 2023-06-13

## 2023-05-16 NOTE — PROGRESS NOTES
Subjective:       Patient ID: Alexa Otero is a 83 y.o. female.    Chief Complaint: Follow-up (6mth f/u)    HPI  Review of Systems   Constitutional:  Negative for fatigue, fever and unexpected weight change.   Respiratory:  Negative for cough, chest tightness, shortness of breath and wheezing.         Congestion and mucous joao in am   Cardiovascular:  Negative for chest pain, palpitations and leg swelling.   Gastrointestinal:  Negative for abdominal pain.   Musculoskeletal:  Negative for arthralgias.   Neurological:  Negative for dizziness, syncope, light-headedness and headaches.     Patient Active Problem List   Diagnosis    Sciatica    Syncope and collapse    Polycythemia, secondary    Hyperlipidemia associated with type 2 diabetes mellitus    Controlled type 2 diabetes mellitus with stage 3 chronic kidney disease, without long-term current use of insulin    Hypertension associated with diabetes    Vitamin D deficiency disease    Gastroesophageal reflux disease    History of Kwon's esophagus    Pancreatic pseudocyst/cyst    Hepatic cyst    Low back pain radiating to both legs    Primary osteoarthritis of right ankle    Kwon's esophagus    Chronic low back pain    Dupuytren's contracture of both hands    Right lower quadrant abdominal swelling, mass and lump    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    Reviewed labs 5/23  Pulm Dr. Christian following h/o aspiration pneumonia (admitted 3/23) and pulm nodule. Suspect COPD-Pfts ordered. S/p bronchoscopy 5/23 neg for cancer/dysplasia. AFB Cx x 3 AND RESP CX x2 NEG     GI Dr. Stephenson  colonoscopy  4/23 -2 polyps 1 tubular adenoma  EGD 3/23 -7 gastric polyps and small hiatal hernia, gastritis-path benign . H pylori neg    Pain mgmt Dr. Robles treating SI joint pain (s/p injection 1/23) and lumbar ddd and spondylosis s/p block 5/23.  C/o persistent pain  Right hip-can't sleep at night. Continuing to do PT. Taking gabapentin 100mg bid , mobic 15mg daily. Steps, walking and lying on that side the worst     History:     Had hospitalization 12/2021 after fall . She was admitted with Acute intratrochanteric comminuted fracture of the right femur and had surgery INSERTION, INTRAMEDULLARY LUC, FEMUR, TROCHANTER/RIGHT TFN DR FAJARDO NOTIFIED REP (Right)  by Dr. Fajardo  She suffered from postoperaive anemia and received one unit pRBC  Later pt was discharged to rehab facility for rehab sessions      DEXA 2/2021 normal      Unusual appearing chronic pelvic mass which may relate to residual uterine 0 or ovarian tissue or postoperative seroma which does indent the dome of the bladder posteriorly.  This is partially visualized and of similar size to a 2016 MRI suggesting a benign etiology.   Unusual configuration to the pancreatic head with mild biliary ductal dilatation.  This is also stable in this patient status post cholecystectomy.(had previous bout of pancreatitis)    Decreased hepatic cysts complex cystic lesion and secondary stable hepatic lesion.   Prominent spinal degenerative changes   No evidence of appendicitis or bowel obstruction.   Colonic diverticulosis and some degree of constipation are evident.      Referred to Dr. Ray due to pain in abd and abnl pelvic mass.  U/s showed solid right adnexal mass.  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  Had port acath placed 10/19/20 . Has now completed chemo 2/2021   Last pet 4/2021 IMPRESSION:  1. Unchanged pleural/parenchymal opacities in the right lung as  outlined above, possibly sequelae of infection/inflammation and/or  scarring, and  less likely prostatic disease.  2. Prior hysterectomy with no fluid to specifically suggest  residual/recurrent disease in the abdomen or pelvis.     Type 2 DM- a1c 5.7 urine micro neg, lipids at goal. On ACE I and zocor. Dks tage 3      Eye Dr. Diehl -mac deg     mammo 3/2022 neg     Vit d normal     Taking ortho for knee pain- Dr. Mojica started on mobic 3/10/20. ? CHERELLE     GI Dr. Stephenson treating GERD, h/o barretts diagnosed 2015 Dr. Jimenez.  treated with HALO and Stretta by Dr. Samuel.  Last egd 2018 benign.   Objective:      Physical Exam  Vitals and nursing note reviewed.   Constitutional:       Appearance: She is well-developed.   Cardiovascular:      Rate and Rhythm: Normal rate and regular rhythm.      Heart sounds: Normal heart sounds.   Pulmonary:      Effort: Pulmonary effort is normal.      Breath sounds: Normal breath sounds.   Skin:     General: Skin is warm and dry.   Neurological:      Mental Status: She is alert and oriented to person, place, and time.       Assessment:       1. Hypertension associated with diabetes    2. Hyperlipidemia associated with type 2 diabetes mellitus    3. Atherosclerosis of aorta    4. Aspiration pneumonia, unspecified aspiration pneumonia type, unspecified laterality, unspecified part of lung    5. Polycythemia, secondary    6. 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 Controlled type 2 diabetes mellitus with stage 3 chronic kidney disease, without long-term current use of insulin    8. Vitamin D deficiency disease    9. Gastroesophageal reflux disease, unspecified whether esophagitis present    10. History of colon polyps    11. Chronic low back pain, unspecified back pain laterality, unspecified whether sciatica present    12. Chronic obstructive pulmonary disease, unspecified COPD type    13. Encounter for diabetic foot exam        Plan:         1. Hypertension associated with diabetes  Controlled on current medications.  Continue current  medications.      2. Hyperlipidemia associated with type 2 diabetes mellitus  Controlled on current medications.  Continue current medications.    - Lipid Panel; Future    3. Atherosclerosis of aorta  Cont to lower risk factors    4. Aspiration pneumonia, unspecified aspiration pneumonia type, unspecified laterality, unspecified part of lung  Resolved.  May be related to GERD. Counseled patient on prevention of reflux with changes in diet and behavior.  I recommended avoidance of greasy and spicy foods, caffeine and eating within 3 hours of bedtime.  I counseled the patient to avoid eating large meals and instead eating more frequent small meals.  I also recommended weight loss and elevation of the head of the bed by 6 inches.  If symptoms persist after these changes medication may be needed to control GERD.      5. Polycythemia, secondary  Cont monitoring and treat as indicated    6. 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 Controlled type 2 diabetes mellitus with stage 3 chronic kidney disease, without long-term current use of insulin  Controlled on current medications.  Continue current medications.    - CBC Auto Differential; Future  - Comprehensive Metabolic Panel; Future  - Hemoglobin A1C; Future    8. Vitamin D deficiency disease  Cont supplement and monitor    9. Gastroesophageal reflux disease, unspecified whether esophagitis present  See above    10. History of colon polyps  Cont GI surveillance    11. Chronic low back pain, unspecified back pain laterality, unspecified whether sciatica present  Cont current mgmt and interventional pain care    12. Chronic obstructive pulmonary disease, unspecified COPD type  Cont pulm consult and treat  - fluticasone furoate-vilanteroL (BREO ELLIPTA) 100-25 mcg/dose diskus inhaler; Inhale 1 puff into the lungs once daily. Controller  Dispense: 90 each; Refill: 3    13. Encounter for diabetic foot exam  Refer for eval and treat  - Ambulatory  referral/consult to Podiatry; Future        Time spent with patient: 20 minutes    Patient with be reevaluated in 6 months or sooner prn    Greater than 50% of this visit was spent counseling as described in above documentation:Yes

## 2023-05-30 ENCOUNTER — HOSPITAL ENCOUNTER (OUTPATIENT)
Facility: AMBULARY SURGERY CENTER | Age: 83
Discharge: HOME | End: 2023-05-30
Attending: ANESTHESIOLOGY | Admitting: ANESTHESIOLOGY
Payer: MEDICARE

## 2023-05-30 ENCOUNTER — TELEPHONE (OUTPATIENT)
Dept: FAMILY MEDICINE | Facility: CLINIC | Age: 83
End: 2023-05-30
Payer: MEDICARE

## 2023-05-30 DIAGNOSIS — M47.896 OTHER SPONDYLOSIS, LUMBAR REGION: ICD-10-CM

## 2023-05-30 LAB — POCT GLUCOSE: 99 MG/DL (ref 70–110)

## 2023-05-30 PROCEDURE — 64451 NJX AA&/STRD NRV NRVTG SI JT: CPT | Mod: RT | Performed by: ANESTHESIOLOGY

## 2023-05-30 PROCEDURE — 64450 NJX AA&/STRD OTHER PN/BRANCH: CPT

## 2023-05-30 PROCEDURE — 64445 NJX AA&/STRD SCIATIC NRV IMG: CPT | Mod: RT,,, | Performed by: ANESTHESIOLOGY

## 2023-05-30 PROCEDURE — 64445 PR NERVE BLOCK INJ, ANES/STEROID, SCIATIC, INCL IMAG GUIDANCE: ICD-10-PCS | Mod: RT,,, | Performed by: ANESTHESIOLOGY

## 2023-05-30 PROCEDURE — 64450 NJX AA&/STRD OTHER PN/BRANCH: CPT | Mod: 59,RT | Performed by: ANESTHESIOLOGY

## 2023-05-30 RX ORDER — SODIUM CHLORIDE, SODIUM LACTATE, POTASSIUM CHLORIDE, CALCIUM CHLORIDE 600; 310; 30; 20 MG/100ML; MG/100ML; MG/100ML; MG/100ML
INJECTION, SOLUTION INTRAVENOUS ONCE AS NEEDED
Status: COMPLETED | OUTPATIENT
Start: 2023-05-30 | End: 2023-05-30

## 2023-05-30 RX ORDER — BUPIVACAINE HYDROCHLORIDE 5 MG/ML
INJECTION, SOLUTION EPIDURAL; INTRACAUDAL
Status: DISCONTINUED | OUTPATIENT
Start: 2023-05-30 | End: 2023-05-30 | Stop reason: HOSPADM

## 2023-05-30 RX ORDER — MIDAZOLAM HYDROCHLORIDE 1 MG/ML
INJECTION INTRAMUSCULAR; INTRAVENOUS
Status: DISCONTINUED | OUTPATIENT
Start: 2023-05-30 | End: 2023-05-30 | Stop reason: HOSPADM

## 2023-05-30 RX ADMIN — SODIUM CHLORIDE, SODIUM LACTATE, POTASSIUM CHLORIDE, CALCIUM CHLORIDE: 600; 310; 30; 20 INJECTION, SOLUTION INTRAVENOUS at 08:05

## 2023-05-30 NOTE — OP NOTE
PROCEDURE DATE: 5/30/2023      PROCEDURE:  Right L5 medial branch nerve block and Right S1, S2 lateral branch nerve blocks under fluoroscopy      DIAGNOSIS:  Other lumbar spondylosis; Sacroiliac joint pain     Post Op diagnosis: Same     PHYSICIAN: Agustin Robles MD     MEDICATIONS INJECTED: 0.5% bupivicaine, 0.5 ml at each level     SEDATION MEDICATIONS: None     LOCAL ANESTHETIC USED: None     ESTIMATED BLOOD LOSS:  None     COMPLICATIONS:  None     TECHNIQUE: A time out was taken to identify the patient, procedure and side of the procedure. The patient was placed in a prone position, then prepped and draped in the usual sterile fashion using ChloraPrep and sterile towels.  The levels were determined under fluoroscopic guidance and then marked.  A 25-gauge 3.5 inch needle was introduced to the anatomic location of the right L5 medial branch nerve and right S1, S2 lateral branch nerves. The above medication was then injected. The patient tolerated the procedure well.     The patient was monitored after the procedure. Patient was given pain diary to record pain levels at home.      If found to have greater than a 50% recovery and so will be scheduled for a radiofrequency ablation of the corresponding nerves.  Patient was given post procedure and discharge instructions to

## 2023-05-30 NOTE — TELEPHONE ENCOUNTER
----- Message from Chandni Medellin sent at 5/30/2023 11:02 AM CDT -----  Type: Needs Medical Advice  Who Called:  pt     Best Call Back Number: [phone]    Additional Information: pt is requesting a call back in regards to the new medication that was talked about , pt states insurance put item on hold and was told to give a call back , please advise

## 2023-05-30 NOTE — PLAN OF CARE
Discharge instructions given to pt, verbalized understanding.  Refused fluids.  IV removed.  No c/o pain.  Ambulating out to   per RN in no distress.

## 2023-05-31 ENCOUNTER — INFUSION (OUTPATIENT)
Dept: INFUSION THERAPY | Facility: HOSPITAL | Age: 83
End: 2023-05-31
Attending: OBSTETRICS & GYNECOLOGY
Payer: MEDICARE

## 2023-05-31 ENCOUNTER — TELEPHONE (OUTPATIENT)
Dept: PAIN MEDICINE | Facility: CLINIC | Age: 83
End: 2023-05-31
Payer: MEDICARE

## 2023-05-31 VITALS
TEMPERATURE: 98 F | RESPIRATION RATE: 17 BRPM | HEART RATE: 91 BPM | OXYGEN SATURATION: 95 % | SYSTOLIC BLOOD PRESSURE: 126 MMHG | WEIGHT: 128 LBS | DIASTOLIC BLOOD PRESSURE: 65 MMHG | BODY MASS INDEX: 22.68 KG/M2 | HEIGHT: 63 IN

## 2023-05-31 DIAGNOSIS — C56.1 CARCINOMA OF RIGHT OVARY: ICD-10-CM

## 2023-05-31 DIAGNOSIS — Z51.11 MAINTENANCE CHEMOTHERAPY: Primary | ICD-10-CM

## 2023-05-31 PROCEDURE — 25000003 PHARM REV CODE 250: Performed by: OBSTETRICS & GYNECOLOGY

## 2023-05-31 PROCEDURE — 96523 IRRIG DRUG DELIVERY DEVICE: CPT

## 2023-05-31 PROCEDURE — 63600175 PHARM REV CODE 636 W HCPCS: Performed by: OBSTETRICS & GYNECOLOGY

## 2023-05-31 PROCEDURE — A4216 STERILE WATER/SALINE, 10 ML: HCPCS | Performed by: OBSTETRICS & GYNECOLOGY

## 2023-05-31 RX ORDER — HEPARIN 100 UNIT/ML
500 SYRINGE INTRAVENOUS
Status: COMPLETED | OUTPATIENT
Start: 2023-05-31 | End: 2023-05-31

## 2023-05-31 RX ORDER — HEPARIN 100 UNIT/ML
500 SYRINGE INTRAVENOUS
Status: CANCELLED
Start: 2023-05-31

## 2023-05-31 RX ORDER — SODIUM CHLORIDE 0.9 % (FLUSH) 0.9 %
10 SYRINGE (ML) INJECTION
Status: CANCELLED
Start: 2023-05-31

## 2023-05-31 RX ORDER — SODIUM CHLORIDE 0.9 % (FLUSH) 0.9 %
10 SYRINGE (ML) INJECTION
Status: DISCONTINUED | OUTPATIENT
Start: 2023-05-31 | End: 2023-05-31 | Stop reason: HOSPADM

## 2023-05-31 RX ADMIN — HEPARIN 500 UNITS: 100 SYRINGE at 04:05

## 2023-05-31 RX ADMIN — SODIUM CHLORIDE, PRESERVATIVE FREE 10 ML: 5 INJECTION INTRAVENOUS at 04:05

## 2023-05-31 NOTE — PATIENT INSTRUCTIONS
Your Diabetes Foot Care Program    Every day you depend on your feet to keep you moving. But when you have diabetes, your feet need special care. Even a small foot problem can become very serious. So dont take your feet for granted. By working with your diabetes healthcare team, you can learn how to protect your feet and keep them healthy.  Evaluating your feet  An evaluation helps your healthcare provider check the condition of your feet. The evaluation includes a review of your diabetes history and overall health. It may also include a foot exam, X-rays, or other tests. These can help show problems beneath the skin that you cant see or feel.  Medical history  You will be asked about your overall health and any history of foot problems. Youll also discuss your diabetes history, such as whether your blood sugar level has changed over time. It also includes questions about sensations of pain, tingling, pins and needles, or numbness. Your healthcare provider will also want to know if you have high blood pressure and heart disease, or if you smoke. Be sure to mention any medicines (including over-the-counter), supplements, or herbal remedies you take.  Foot exam  A foot exam checks the condition of different parts of your foot. First, your skin and nails are examined for any signs of infection. Blood flow is checked by feeling for the pulses in each foot. You may also have tests to study the nerves in the foot. These include using a small filament (wire) to see how sensitive your feet are. In certain cases, you will be asked to walk a short distance to check for bone, joint, and muscle problems.  Diagnostic tests  If needed, your healthcare provider will suggest certain tests to learn more about your feet. These include:  Doppler tests to measure blood flow in the feet and lower leg.  X-rays, which can show bone or joint problems.  Other imaging tests, such as an MRI (magnetic resonance imaging), bone scan, and CT  (computed tomography) scan. These can help show bone infections.  Other tests, such as vascular tests, which study the blood flow in your feet and legs. You may also have nerve studies to learn how sensitive your feet are.  Creating a foot care program  Based on the evaluation, your healthcare provider will create a foot care program for you. Your program may be as simple as starting a daily self-care routine and changing the types of shoes your wear. It may also involve treating minor foot problems, such as a corn or blister. In some cases, surgery will be needed to treat an infection or mechanical problems, such as hammer toes.  Preventing problems  When you have diabetes, its easier to prevent problems than to treat them later on. So see your healthcare team for regular checkups and foot care. Your healthcare team can also help you learn more about caring for your feet at home. For example, you may be told to avoid walking barefoot. Or you may be told that special footwear is needed to protect your feet.  Have regular checkups  Foot problems can develop quickly. So be sure to follow your healthcare teams schedule for regular checkups. During office visits, take off your shoes and socks as soon as you get in the exam room. Ask your healthcare provider to examine your feet for problems. This will make it easier to find and treat small skin irritations before they get worse. Regular checkups can also help keep track of the blood flow and feeling in your feet. If you have neuropathy (lack of feeling in your feet), you will need to have checkups more often.  Learn about self-care  The more you know about diabetes and your feet, the easier it will be to prevent problems. Members of your healthcare team can teach you how to inspect your feet and teach you to look for warning signs. They can also give you other foot care tips. During office visits, be sure to ask any questions you have.  Date Last Reviewed: 7/1/2016  ©  3432-3593 [employer]. [address] [place] [address], [place] [address]. All rights reserved. This information is not intended as a substitute for professional medical care. Always follow your healthcare professional's instructions.

## 2023-06-01 VITALS
WEIGHT: 122.38 LBS | HEART RATE: 82 BPM | RESPIRATION RATE: 18 BRPM | SYSTOLIC BLOOD PRESSURE: 132 MMHG | BODY MASS INDEX: 21.68 KG/M2 | HEIGHT: 63 IN | OXYGEN SATURATION: 94 % | TEMPERATURE: 98 F | DIASTOLIC BLOOD PRESSURE: 60 MMHG

## 2023-06-12 LAB
FUNGUS SPEC CULT: NORMAL

## 2023-06-13 ENCOUNTER — OFFICE VISIT (OUTPATIENT)
Dept: PODIATRY | Facility: CLINIC | Age: 83
End: 2023-06-13
Payer: MEDICARE

## 2023-06-13 VITALS — RESPIRATION RATE: 16 BRPM | WEIGHT: 122 LBS | HEIGHT: 63 IN | BODY MASS INDEX: 21.62 KG/M2 | HEART RATE: 84 BPM

## 2023-06-13 DIAGNOSIS — I87.2 VENOUS INSUFFICIENCY OF BOTH LOWER EXTREMITIES: ICD-10-CM

## 2023-06-13 DIAGNOSIS — E11.42 DIABETIC POLYNEUROPATHY ASSOCIATED WITH TYPE 2 DIABETES MELLITUS: Primary | ICD-10-CM

## 2023-06-13 DIAGNOSIS — E11.9 ENCOUNTER FOR DIABETIC FOOT EXAM: ICD-10-CM

## 2023-06-13 DIAGNOSIS — M21.619 BUNION: ICD-10-CM

## 2023-06-13 PROCEDURE — 1101F PT FALLS ASSESS-DOCD LE1/YR: CPT | Mod: CPTII,S$GLB,, | Performed by: PODIATRIST

## 2023-06-13 PROCEDURE — 1101F PR PT FALLS ASSESS DOC 0-1 FALLS W/OUT INJ PAST YR: ICD-10-PCS | Mod: CPTII,S$GLB,, | Performed by: PODIATRIST

## 2023-06-13 PROCEDURE — 99204 PR OFFICE/OUTPT VISIT, NEW, LEVL IV, 45-59 MIN: ICD-10-PCS | Mod: S$GLB,,, | Performed by: PODIATRIST

## 2023-06-13 PROCEDURE — 3288F PR FALLS RISK ASSESSMENT DOCUMENTED: ICD-10-PCS | Mod: CPTII,S$GLB,, | Performed by: PODIATRIST

## 2023-06-13 PROCEDURE — 1157F PR ADVANCE CARE PLAN OR EQUIV PRESENT IN MEDICAL RECORD: ICD-10-PCS | Mod: CPTII,S$GLB,, | Performed by: PODIATRIST

## 2023-06-13 PROCEDURE — 1125F PR PAIN SEVERITY QUANTIFIED, PAIN PRESENT: ICD-10-PCS | Mod: CPTII,S$GLB,, | Performed by: PODIATRIST

## 2023-06-13 PROCEDURE — 1160F PR REVIEW ALL MEDS BY PRESCRIBER/CLIN PHARMACIST DOCUMENTED: ICD-10-PCS | Mod: CPTII,S$GLB,, | Performed by: PODIATRIST

## 2023-06-13 PROCEDURE — 1160F RVW MEDS BY RX/DR IN RCRD: CPT | Mod: CPTII,S$GLB,, | Performed by: PODIATRIST

## 2023-06-13 PROCEDURE — 99999 PR PBB SHADOW E&M-EST. PATIENT-LVL IV: ICD-10-PCS | Mod: PBBFAC,,, | Performed by: PODIATRIST

## 2023-06-13 PROCEDURE — 1125F AMNT PAIN NOTED PAIN PRSNT: CPT | Mod: CPTII,S$GLB,, | Performed by: PODIATRIST

## 2023-06-13 PROCEDURE — 1159F MED LIST DOCD IN RCRD: CPT | Mod: CPTII,S$GLB,, | Performed by: PODIATRIST

## 2023-06-13 PROCEDURE — 1159F PR MEDICATION LIST DOCUMENTED IN MEDICAL RECORD: ICD-10-PCS | Mod: CPTII,S$GLB,, | Performed by: PODIATRIST

## 2023-06-13 PROCEDURE — 3288F FALL RISK ASSESSMENT DOCD: CPT | Mod: CPTII,S$GLB,, | Performed by: PODIATRIST

## 2023-06-13 PROCEDURE — 1157F ADVNC CARE PLAN IN RCRD: CPT | Mod: CPTII,S$GLB,, | Performed by: PODIATRIST

## 2023-06-13 PROCEDURE — 99999 PR PBB SHADOW E&M-EST. PATIENT-LVL IV: CPT | Mod: PBBFAC,,, | Performed by: PODIATRIST

## 2023-06-13 PROCEDURE — 99204 OFFICE O/P NEW MOD 45 MIN: CPT | Mod: S$GLB,,, | Performed by: PODIATRIST

## 2023-06-13 RX ORDER — CHLORTHALIDONE 25 MG/1
25 TABLET ORAL DAILY
Status: ON HOLD | COMMUNITY
End: 2023-11-05 | Stop reason: HOSPADM

## 2023-06-13 NOTE — PROGRESS NOTES
"    [address]  [place] [address]  Phone: [phone]   [phone]    Patient's PCP:Idalia Greco MD  Referring Provider: Dr. Idalia Greco    Subjective:      Chief Complaint:: Diabetic Foot Exam (Yearly exam)    ELLIE Otero is a 83 y.o. female who presents today for a diabetic foot exam.  Pt has seen Idalia Greco MD on 05/16/2023 who treats them for their diabetes.  Pt has been a diabetic for over five years.  Taking metformin to treat diabetes.      Blood sugar: does not check  Hemoglobin A1C: 5.4        Vitals:    06/13/23 1123   Pulse: 84   Resp: 16   Weight: 55.3 kg (122 lb)   Height: 5' 3" (1.6 m)   PainSc:   2      Shoe Size: 8    Past Surgical History:   Procedure Laterality Date    AUGMENTATION OF BREAST      BRONCHOSCOPY WITH FLUOROSCOPY Right 5/11/2023    Procedure: BRONCHOSCOPY, WITH FLUOROSCOPY;  Surgeon: Neva Christian MD;  Location: [facility];  Service: Pulmonary;  Laterality: Right;    CATARACT EXTRACTION BILATERAL W/ ANTERIOR VITRECTOMY Bilateral     CHOLECYSTECTOMY      COLONOSCOPY      COLONOSCOPY N/A 04/20/2023    Procedure: COLONOSCOPY;  Surgeon: Sabino Stephenson MD;  Location: [facility];  Service: Endoscopy;  Laterality: N/A;    ESOPHAGOGASTRODUODENOSCOPY N/A 06/20/2018    Procedure: EGD (ESOPHAGOGASTRODUODENOSCOPY);  Surgeon: Sabino Stephenson MD;  Location: [facility];  Service: Endoscopy;  Laterality: N/A;    ESOPHAGOGASTRODUODENOSCOPY N/A 03/31/2023    Procedure: EGD (ESOPHAGOGASTRODUODENOSCOPY);  Surgeon: Sabino Stephenson MD;  Location: [facility];  Service: Endoscopy;  Laterality: N/A;    HYSTERECTOMY  1976    partial    INJECTION OF ANESTHETIC AGENT AROUND MEDIAL BRANCH NERVES INNERVATING LUMBAR FACET JOINT Right 5/10/2023    Procedure: Block-nerve-Lateral-branch-lumbar;  Surgeon: Agustin Robles MD;  Location: [facility] OR;  Service: Pain Management;  Laterality: Right;  L5 and s1,s2 LBB    INJECTION OF ANESTHETIC AGENT AROUND MEDIAL BRANCH NERVES " INNERVATING LUMBAR FACET JOINT Right 5/30/2023    Procedure: Block-nerve-medial branch-lumbar;  Surgeon: Agustin Robles MD;  Location: [facility] OR;  Service: Pain Management;  Laterality: Right;  L5, s1 ,s2 LBB #2    INJECTION, SACROILIAC JOINT Right 01/26/2023    Procedure: INJECTION,SACROILIAC JOINT;  Surgeon: Agustin Robles MD;  Location: [facility] OR;  Service: Pain Management;  Laterality: Right;    INSERTION OF TUNNELED CENTRAL VENOUS CATHETER (CVC) WITH SUBCUTANEOUS PORT Right 10/19/2020    Procedure: INSERTION, PORT-A-CATH;  Surgeon: Misti Mcfarland MD;  Location: [facility] OR;  Service: General;  Laterality: Right;    INTRAMEDULLARY RODDING OF TROCHANTER OF FEMUR Right 11/28/2021    Procedure: INSERTION, INTRAMEDULLARY LUC, FEMUR, TROCHANTER/RIGHT TFN DR FAJARDO NOTIFIED REP;  Surgeon: Kp Fajardo MD;  Location: [facility] OR;  Service: Orthopedics;  Laterality: Right;  SKIP    right hip fracture Right     hardware down to thigh    ROBOT-ASSISTED LAPAROSCOPIC LYMPHADENECTOMY USING DA NELLA XI N/A 09/21/2020    Procedure: XI ROBOTIC LYMPHADENECTOMY-pelvic and kell-aortic;  Surgeon: Altagracia Ray MD;  Location: [facility] OR;  Service: OB/GYN;  Laterality: N/A;    ROBOT-ASSISTED LAPAROSCOPIC OMENTECTOMY USING DA NELLA XI N/A 09/21/2020    Procedure: XI ROBOTIC OMENTECTOMY;  Surgeon: Altagracia Ray MD;  Location: [facility] OR;  Service: OB/GYN;  Laterality: N/A;    ROBOT-ASSISTED LAPAROSCOPIC SALPINGO-OOPHORECTOMY USING DA NELLA XI Bilateral 09/21/2020    Procedure: XI ROBOTIC SALPINGO-OOPHORECTOMY;  Surgeon: Altagracia Ray MD;  Location: [facility] OR;  Service: OB/GYN;  Laterality: Bilateral;    UPPER GASTROINTESTINAL ENDOSCOPY       Past Medical History:   Diagnosis Date    Arthritis     Cataract     Colon polyp     Diabetes mellitus type II 2012    Encounter for blood transfusion     Extra-ovarian endometrioid adenocarcinoma 2020    GERD (gastroesophageal reflux disease)     History of chronic cough     Hyperlipidemia      Hypertension     Macular degeneration     Ovarian cancer     PONV (postoperative nausea and vomiting)     Squamous cell carcinoma 1980's    precancer of cervix     Family History   Problem Relation Age of Onset    Cancer Mother 57        lung    Cancer Father 56        oral    Skin cancer Sister     Skin cancer Brother     Melanoma Brother     Skin cancer Brother     Breast cancer Maternal Grandmother     Breast cancer Paternal Grandmother     Colon polyps Daughter     Psoriasis Neg Hx     Lupus Neg Hx     Eczema Neg Hx     Colon cancer Neg Hx     Crohn's disease Neg Hx     Esophageal cancer Neg Hx     Stomach cancer Neg Hx     Ulcerative colitis Neg Hx         Social History:   Marital Status:   Alcohol History:  reports current alcohol use of about 1.0 standard drink per week.  Tobacco History:  reports that she quit smoking about 42 years ago. Her smoking use included cigarettes. She has a 20.00 pack-year smoking history. She has never used smokeless tobacco.  Drug History:  reports no history of drug use.    Review of patient's allergies indicates:  No Known Allergies    Current Outpatient Medications   Medication Sig Dispense Refill    benazepriL (LOTENSIN) 40 MG tablet TAKE 1 TABLET(40 MG) BY MOUTH EVERY DAY (Patient taking differently: every evening.) 90 tablet 2    benzonatate (TESSALON) 100 MG capsule Take by mouth 3 (three) times daily as needed.      chlorthalidone (HYGROTEN) 25 MG Tab Take 25 mg by mouth.      gabapentin (NEURONTIN) 100 MG capsule TAKE 1 CAPSULE(100 MG) BY MOUTH TWICE DAILY 180 capsule 3    hydroCHLOROthiazide (HYDRODIURIL) 25 MG tablet TAKE 1 TABLET(25 MG) BY MOUTH EVERY DAY 90 tablet 3    melatonin 5 mg Chew Take 2 tablets by mouth every evening.      metFORMIN (GLUCOPHAGE) 500 MG tablet TAKE 1 TABLET(500 MG) BY MOUTH TWICE DAILY WITH MEALS 180 tablet 3    omeprazole (PRILOSEC) 40 MG capsule TAKE 1 CAPSULE(40 MG) BY MOUTH EVERY DAY 90 capsule 3    simvastatin (ZOCOR) 40 MG  tablet TAKE 1 TABLET(40 MG) BY MOUTH EVERY EVENING 90 tablet 0    VIT A/VIT C/VIT E/ZINC/COPPER (ICAPS AREDS ORAL) Take 1 capsule by mouth 2 (two) times a day.        No current facility-administered medications for this visit.       Review of Systems   Constitutional:  Negative for chills, fatigue, fever and unexpected weight change.   HENT:  Negative for hearing loss and trouble swallowing.    Eyes:  Negative for photophobia and visual disturbance.   Respiratory:  Negative for cough, shortness of breath and wheezing.    Cardiovascular:  Positive for leg swelling. Negative for chest pain and palpitations.   Gastrointestinal:  Negative for abdominal pain and nausea.   Genitourinary:  Negative for dysuria and frequency.   Musculoskeletal:  Positive for back pain. Negative for arthralgias, gait problem, joint swelling and myalgias.   Skin:  Negative for rash and wound.   Neurological:  Positive for numbness. Negative for tremors, seizures, speech difficulty, weakness and headaches.   Hematological:  Does not bruise/bleed easily.       Objective:        Physical Exam:   Foot Exam    General  General Appearance: appears stated age and healthy   Orientation: alert and oriented to person, place, and time   Affect: appropriate   Gait: unimpaired       Right Foot/Ankle     Inspection and Palpation  Ecchymosis: none  Tenderness: none   Swelling: (Minimal lower extremity edema)  Arch: normal  Skin Exam: skin intact; no drainage, no ulcer and no erythema   Neurovascular  Dorsalis pedis: 2+  Posterior tibial: 2+  Capillary Refill: 2+  Varicose veins: present  Saphenous nerve sensation: diminished  Tibial nerve sensation: diminished  Superficial peroneal nerve sensation: diminished  Deep peroneal nerve sensation: diminished  Sural nerve sensation: diminished    Edema  Type of edema: non-pitting    Muscle Strength  Ankle dorsiflexion: 5  Ankle plantar flexion: 5  Ankle inversion: 5  Ankle eversion: 5  Great toe extension:  5  Great toe flexion: 5    Range of Motion    Normal right ankle ROM    Tests  Anterior drawer: negative   Talar tilt: negative   PT Tinel's sign: negative    Paresthesia: positive  Comments  Skin is healthy.  Normal texture and turgor.    Left Foot/Ankle      Inspection and Palpation  Ecchymosis: none  Tenderness: none   Swelling: (Minimal lower extremity edema)  Arch: normal  Skin Exam: skin intact; no drainage, no ulcer and no erythema   Neurovascular  Dorsalis pedis: 2+  Posterior tibial: 2+  Capillary refill: 2+  Varicose veins: present  Saphenous nerve sensation: diminished  Tibial nerve sensation: diminished  Superficial peroneal nerve sensation: diminished  Deep peroneal nerve sensation: diminished  Sural nerve sensation: diminished    Edema  Type of edema: non-pitting    Muscle Strength  Ankle dorsiflexion: 5  Ankle plantar flexion: 5  Ankle inversion: 5  Ankle eversion: 5  Great toe extension: 5  Great toe flexion: 5    Range of Motion    Normal left ankle ROM    Tests  Anterior drawer: negative   Talar tilt: negative   PT Tinel's sign: negative  Paresthesia: positive  Comments  Skin is healthy.  Normal texture and turgor.  Physical Exam  Cardiovascular:      Pulses:           Dorsalis pedis pulses are 2+ on the right side and 2+ on the left side.        Posterior tibial pulses are 2+ on the right side and 2+ on the left side.   Feet:      Right foot:      Skin integrity: No ulcer or erythema.      Left foot:      Skin integrity: No ulcer or erythema.       Imaging: none            Assessment:       1. Diabetic polyneuropathy associated with type 2 diabetes mellitus    2. Venous insufficiency of both lower extremities    3. Bunion    4. Encounter for diabetic foot exam      Plan:   Diabetic polyneuropathy associated with type 2 diabetes mellitus  -      DIABETES FOOT EXAM    Venous insufficiency of both lower extremities    Bunion    Encounter for diabetic foot exam  -      DIABETES FOOT EXAM  -      Ambulatory referral/consult to Podiatry      Follow up in about 1 year (around 6/13/2024), or if symptoms worsen or fail to improve.    Procedures        Counseled patient on the aspects of diabetes and how it pertains to the feet.  I explained the importance of proper diabetic foot care and how it is essential for the health of their feet.    I discussed the importance of knowing their HGA1c and that the level needs to be as close to 6 as possible.  I discussed the increase complications of high blood sugar including stroke, blindness, heart attack, kidney failure and loss of limb secondary to neuropathy and PVD.    Patient  was made aware of inspecting their feet.  Patient was told to be aware of any breaks in the skin or redness.  With neuropathy, these areas are not recognized early due to the numbness.  I discussed different treatments available to control the symptoms but whcih may not cure the problem.      Shoe inspection. Patient instructed on proper foot hygeine. We discussed wearing proper shoe gear, daily foot inspections, never walking without protective shoe gear, never putting sharp instruments to feet.    I recommend B complex vitamin daily for nerve health    Counseling:     I provided patient education verbally regarding:   Patient diagnosis, treatment options, as well as alternatives, risks, and benefits.     This note was created using Dragon voice recognition software that occasionally misinterpreted phrases or words.

## 2023-06-19 PROBLEM — J18.9 PNEUMONIA: Status: RESOLVED | Noted: 2023-03-19 | Resolved: 2023-06-19

## 2023-06-27 LAB

## 2023-07-10 ENCOUNTER — TELEPHONE (OUTPATIENT)
Dept: PAIN MEDICINE | Facility: CLINIC | Age: 83
End: 2023-07-10
Payer: MEDICARE

## 2023-07-10 ENCOUNTER — INFUSION (OUTPATIENT)
Dept: INFUSION THERAPY | Facility: HOSPITAL | Age: 83
End: 2023-07-10
Attending: OBSTETRICS & GYNECOLOGY
Payer: MEDICARE

## 2023-07-10 VITALS
OXYGEN SATURATION: 98 % | DIASTOLIC BLOOD PRESSURE: 65 MMHG | HEIGHT: 63 IN | SYSTOLIC BLOOD PRESSURE: 115 MMHG | WEIGHT: 125.19 LBS | BODY MASS INDEX: 22.18 KG/M2 | HEART RATE: 99 BPM | RESPIRATION RATE: 16 BRPM | TEMPERATURE: 98 F

## 2023-07-10 DIAGNOSIS — M46.1 SACROILIITIS: Primary | ICD-10-CM

## 2023-07-10 DIAGNOSIS — Z51.11 MAINTENANCE CHEMOTHERAPY: Primary | ICD-10-CM

## 2023-07-10 DIAGNOSIS — C56.1 CARCINOMA OF RIGHT OVARY: ICD-10-CM

## 2023-07-10 PROCEDURE — A4216 STERILE WATER/SALINE, 10 ML: HCPCS | Performed by: OBSTETRICS & GYNECOLOGY

## 2023-07-10 PROCEDURE — 63600175 PHARM REV CODE 636 W HCPCS: Performed by: OBSTETRICS & GYNECOLOGY

## 2023-07-10 PROCEDURE — 25000003 PHARM REV CODE 250: Performed by: OBSTETRICS & GYNECOLOGY

## 2023-07-10 PROCEDURE — 96523 IRRIG DRUG DELIVERY DEVICE: CPT

## 2023-07-10 RX ORDER — SODIUM CHLORIDE 0.9 % (FLUSH) 0.9 %
10 SYRINGE (ML) INJECTION
Status: DISCONTINUED | OUTPATIENT
Start: 2023-07-10 | End: 2023-07-10 | Stop reason: HOSPADM

## 2023-07-10 RX ORDER — HEPARIN 100 UNIT/ML
500 SYRINGE INTRAVENOUS
Status: CANCELLED
Start: 2023-07-10

## 2023-07-10 RX ORDER — SODIUM CHLORIDE 0.9 % (FLUSH) 0.9 %
10 SYRINGE (ML) INJECTION
Status: CANCELLED
Start: 2023-07-10

## 2023-07-10 RX ORDER — HEPARIN 100 UNIT/ML
500 SYRINGE INTRAVENOUS
Status: COMPLETED | OUTPATIENT
Start: 2023-07-10 | End: 2023-07-10

## 2023-07-10 RX ADMIN — HEPARIN 500 UNITS: 100 SYRINGE at 10:07

## 2023-07-10 RX ADMIN — SODIUM CHLORIDE, PRESERVATIVE FREE 10 ML: 5 INJECTION INTRAVENOUS at 10:07

## 2023-07-10 NOTE — PLAN OF CARE
Problem: Infection  Goal: Absence of Infection Signs and Symptoms  Outcome: Ongoing, Progressing  Intervention: Prevent or Manage Infection  Flowsheets (Taken 7/10/2023 1004)  Infection Management: aseptic technique maintained  Isolation Precautions: precautions maintained

## 2023-07-10 NOTE — TELEPHONE ENCOUNTER
----- Message from Leola Vivar LPN sent at 7/10/2023 10:00 AM CDT -----  Contact: self  She is wanting to know about getting scheduled for RFA  ----- Message -----  From: Mil Leggett  Sent: 7/10/2023   8:44 AM CDT  To: Travis Velez Staff    Type: Needs Medical Advice  Who Called: Patient   Best Call Back Number: [phone]  Additional Information: pt states she had 2 injections. Pt wants to know what is the next step in this process plz call to advise. Thanks

## 2023-07-11 NOTE — TELEPHONE ENCOUNTER
Spoke with pt she states she had 80% relief for 3 hours from 2nd LBB      Scheduled for OR 08/16 [t could not do 08/02

## 2023-07-26 ENCOUNTER — OFFICE VISIT (OUTPATIENT)
Dept: PULMONOLOGY | Facility: CLINIC | Age: 83
End: 2023-07-26
Payer: MEDICARE

## 2023-07-26 ENCOUNTER — HOSPITAL ENCOUNTER (OUTPATIENT)
Dept: RADIOLOGY | Facility: HOSPITAL | Age: 83
Discharge: HOME | End: 2023-07-26
Attending: INTERNAL MEDICINE
Payer: MEDICARE

## 2023-07-26 VITALS
WEIGHT: 125 LBS | DIASTOLIC BLOOD PRESSURE: 60 MMHG | OXYGEN SATURATION: 96 % | HEIGHT: 63 IN | SYSTOLIC BLOOD PRESSURE: 110 MMHG | HEART RATE: 90 BPM | BODY MASS INDEX: 22.15 KG/M2

## 2023-07-26 DIAGNOSIS — J18.9 PNEUMONIA OF RIGHT LOWER LOBE DUE TO INFECTIOUS ORGANISM: ICD-10-CM

## 2023-07-26 DIAGNOSIS — J18.9 PNEUMONIA OF RIGHT LOWER LOBE DUE TO INFECTIOUS ORGANISM: Primary | ICD-10-CM

## 2023-07-26 PROCEDURE — 1157F ADVNC CARE PLAN IN RCRD: CPT | Mod: CPTII,S$GLB,, | Performed by: INTERNAL MEDICINE

## 2023-07-26 PROCEDURE — 1126F AMNT PAIN NOTED NONE PRSNT: CPT | Mod: CPTII,S$GLB,, | Performed by: INTERNAL MEDICINE

## 2023-07-26 PROCEDURE — 1126F PR PAIN SEVERITY QUANTIFIED, NO PAIN PRESENT: ICD-10-PCS | Mod: CPTII,S$GLB,, | Performed by: INTERNAL MEDICINE

## 2023-07-26 PROCEDURE — 3078F PR MOST RECENT DIASTOLIC BLOOD PRESSURE < 80 MM HG: ICD-10-PCS | Mod: CPTII,S$GLB,, | Performed by: INTERNAL MEDICINE

## 2023-07-26 PROCEDURE — 3074F SYST BP LT 130 MM HG: CPT | Mod: CPTII,S$GLB,, | Performed by: INTERNAL MEDICINE

## 2023-07-26 PROCEDURE — 1159F MED LIST DOCD IN RCRD: CPT | Mod: CPTII,S$GLB,, | Performed by: INTERNAL MEDICINE

## 2023-07-26 PROCEDURE — 71046 X-RAY EXAM CHEST 2 VIEWS: CPT | Mod: TC

## 2023-07-26 PROCEDURE — 99214 PR OFFICE/OUTPT VISIT, EST, LEVL IV, 30-39 MIN: ICD-10-PCS | Mod: S$GLB,,, | Performed by: INTERNAL MEDICINE

## 2023-07-26 PROCEDURE — 3078F DIAST BP <80 MM HG: CPT | Mod: CPTII,S$GLB,, | Performed by: INTERNAL MEDICINE

## 2023-07-26 PROCEDURE — 1159F PR MEDICATION LIST DOCUMENTED IN MEDICAL RECORD: ICD-10-PCS | Mod: CPTII,S$GLB,, | Performed by: INTERNAL MEDICINE

## 2023-07-26 PROCEDURE — 3074F PR MOST RECENT SYSTOLIC BLOOD PRESSURE < 130 MM HG: ICD-10-PCS | Mod: CPTII,S$GLB,, | Performed by: INTERNAL MEDICINE

## 2023-07-26 PROCEDURE — 1157F PR ADVANCE CARE PLAN OR EQUIV PRESENT IN MEDICAL RECORD: ICD-10-PCS | Mod: CPTII,S$GLB,, | Performed by: INTERNAL MEDICINE

## 2023-07-26 PROCEDURE — 99214 OFFICE O/P EST MOD 30 MIN: CPT | Mod: S$GLB,,, | Performed by: INTERNAL MEDICINE

## 2023-07-26 NOTE — PROGRESS NOTES
SUBJECTIVE:    Patient ID: Alexa Otero is a 83 y.o. female.    Chief Complaint: Follow-up    HPI   She is still coughing.  She is expectorating multiple ounces of clear sputum daily. Her bronchoscopy was consistent with aspiration.  She is not any better.  She has the head of her bed up on blocks.  She is waiting at least 3 hours between supper and lying down.  She continues to expectorate great quantities of clear mucus.    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    precancer of cervix     Past Surgical History:   Procedure Laterality Date    AUGMENTATION OF BREAST      BRONCHOSCOPY WITH FLUOROSCOPY Right 5/11/2023    Procedure: BRONCHOSCOPY, WITH FLUOROSCOPY;  Surgeon: Neva Christian MD;  Location: [facility];  Service: Pulmonary;  Laterality: Right;    CATARACT EXTRACTION BILATERAL W/ ANTERIOR VITRECTOMY Bilateral     CHOLECYSTECTOMY      COLONOSCOPY      COLONOSCOPY N/A 04/20/2023    Procedure: COLONOSCOPY;  Surgeon: Sabino Stephenson MD;  Location: [facility];  Service: Endoscopy;  Laterality: N/A;    ESOPHAGOGASTRODUODENOSCOPY N/A 06/20/2018    Procedure: EGD (ESOPHAGOGASTRODUODENOSCOPY);  Surgeon: Sabino Stephenson MD;  Location: [facility];  Service: Endoscopy;  Laterality: N/A;    ESOPHAGOGASTRODUODENOSCOPY N/A 03/31/2023    Procedure: EGD (ESOPHAGOGASTRODUODENOSCOPY);  Surgeon: Sabino Stephenson MD;  Location: [facility];  Service: Endoscopy;  Laterality: N/A;    HYSTERECTOMY  1976    partial    INJECTION OF ANESTHETIC AGENT AROUND MEDIAL BRANCH NERVES INNERVATING LUMBAR FACET JOINT Right 5/10/2023    Procedure:  Block-nerve-Lateral-branch-lumbar;  Surgeon: Agustin Robles MD;  Location: [facility];  Service: Pain Management;  Laterality: Right;  L5 and s1,s2 LBB    INJECTION OF ANESTHETIC AGENT AROUND MEDIAL BRANCH NERVES INNERVATING LUMBAR FACET JOINT Right 5/30/2023    Procedure: Block-nerve-medial branch-lumbar;  Surgeon: Agustin Robles MD;  Location: [facility];  Service: Pain Management;  Laterality: Right;  L5, s1 ,s2 LBB #2    INJECTION, SACROILIAC JOINT Right 01/26/2023    Procedure: INJECTION,SACROILIAC JOINT;  Surgeon: Agustin Robles MD;  Location: [facility];  Service: Pain Management;  Laterality: Right;    INSERTION OF TUNNELED CENTRAL VENOUS CATHETER (CVC) WITH SUBCUTANEOUS PORT Right 10/19/2020    Procedure: INSERTION, PORT-A-CATH;  Surgeon: Misti Mcfarland MD;  Location: [facility];  Service: General;  Laterality: Right;    INTRAMEDULLARY RODDING OF TROCHANTER OF FEMUR Right 11/28/2021    Procedure: INSERTION, INTRAMEDULLARY LUC, FEMUR, TROCHANTER/RIGHT TFN DR FAJARDO NOTIFIED REP;  Surgeon: Kp Fajardo MD;  Location: [facility];  Service: Orthopedics;  Laterality: Right;  SKIP    right hip fracture Right     hardware down to thigh    ROBOT-ASSISTED LAPAROSCOPIC LYMPHADENECTOMY USING DA NELLA XI N/A 09/21/2020    Procedure: XI ROBOTIC LYMPHADENECTOMY-pelvic and kell-aortic;  Surgeon: Altagracia Ray MD;  Location: [facility] OR;  Service: OB/GYN;  Laterality: N/A;    ROBOT-ASSISTED LAPAROSCOPIC OMENTECTOMY USING DA NELLA XI N/A 09/21/2020    Procedure: XI ROBOTIC OMENTECTOMY;  Surgeon: Altagracia Ray MD;  Location: [facility] OR;  Service: OB/GYN;  Laterality: N/A;    ROBOT-ASSISTED LAPAROSCOPIC SALPINGO-OOPHORECTOMY USING DA NELLA XI Bilateral 09/21/2020    Procedure: XI ROBOTIC SALPINGO-OOPHORECTOMY;  Surgeon: Altagracia Ray MD;  Location: [facility] OR;  Service: OB/GYN;  Laterality: Bilateral;    UPPER GASTROINTESTINAL ENDOSCOPY       Family History   Problem Relation Age of Onset    Cancer Mother 57        lung    Cancer  Father 56        oral    Skin cancer Sister     Skin cancer Brother     Melanoma Brother     Skin cancer Brother     Breast cancer Maternal Grandmother     Breast cancer Paternal Grandmother     Colon polyps Daughter     Psoriasis Neg Hx     Lupus Neg Hx     Eczema Neg Hx     Colon cancer Neg Hx     Crohn's disease Neg Hx     Esophageal cancer Neg Hx     Stomach cancer Neg Hx     Ulcerative colitis Neg Hx         Social History:   Marital Status:   Occupation: Data Unavailable  Alcohol History:  reports current alcohol use of about 1.0 standard drink per week.  Tobacco History:  reports that she quit smoking about 42 years ago. Her smoking use included cigarettes. She has a 20.00 pack-year smoking history. She has never used smokeless tobacco.  Drug History:  reports no history of drug use.    Review of patient's allergies indicates:  No Known Allergies    Current Outpatient Medications   Medication Sig Dispense Refill    benazepriL (LOTENSIN) 40 MG tablet TAKE 1 TABLET(40 MG) BY MOUTH EVERY DAY (Patient taking differently: every evening.) 90 tablet 2    benzonatate (TESSALON) 100 MG capsule Take by mouth 3 (three) times daily as needed.      chlorthalidone (HYGROTEN) 25 MG Tab Take 25 mg by mouth.      gabapentin (NEURONTIN) 100 MG capsule TAKE 1 CAPSULE(100 MG) BY MOUTH TWICE DAILY 180 capsule 3    hydroCHLOROthiazide (HYDRODIURIL) 25 MG tablet TAKE 1 TABLET(25 MG) BY MOUTH EVERY DAY 90 tablet 3    melatonin 5 mg Chew Take 2 tablets by mouth every evening.      metFORMIN (GLUCOPHAGE) 500 MG tablet TAKE 1 TABLET(500 MG) BY MOUTH TWICE DAILY WITH MEALS 180 tablet 3    omeprazole (PRILOSEC) 40 MG capsule TAKE 1 CAPSULE(40 MG) BY MOUTH EVERY DAY 90 capsule 3    simvastatin (ZOCOR) 40 MG tablet TAKE 1 TABLET(40 MG) BY MOUTH EVERY EVENING 90 tablet 0    VIT A/VIT C/VIT E/ZINC/COPPER (ICAPS AREDS ORAL) Take 1 capsule by mouth 2 (two) times a day.        No current facility-administered medications for this visit.  "      Alpha-1 Antitrypsin:  Last PFT:   Last CT:3/18/23  IMPRESSION:  No CT evidence for pulmonary embolism.     Focal area of airspace opacity/consolidation involving the posterior segment right lower lobe no significantly changed when compared to prior study.   Emphysematous changes.   Prominent right hilar and subcarinal lymph nodes, may be reactive in nature.   Inferior deformity with minimal increase sclerosis at T11 perhaps suggesting old compression deformity.    Review of Systems  General: Feeling ok  Eyes: Vision is good. She has macular degeneration.  ENT:  No sinusitis or pharyngitis.   Heart:: No chest pain or palpitations.  Lungs: Coughs, produces clear mucus.  She isstill producing a great quantity of clear mucus through the day and night  GI:  Back to alternating diarrhea and constipation  : No dysuria, hesitancy, or nocturia.  Musculoskeletal: has R hip pain  Skin: No lesions or rashes.  Neuro: No headaches or neuropathy.  Lymph: No edema or adenopathy.  Psych: No anxiety or depression.  Endo: Weight stable    OBJECTIVE:      /60 (BP Location: Right arm, Patient Position: Sitting, BP Method: Medium (Manual))   Pulse 90   Ht 5' 3" (1.6 m)   Wt 56.7 kg (125 lb)   SpO2 96%   BMI 22.14 kg/m²     Physical Exam  GENERAL: Older patient in no distress.  HEENT: Pupils equal and reactive. Extraocular movements intact. Nose intact.      Pharynx moist.  NECK: Supple.   HEART: Regular rate and rhythm. No murmur or gallop auscultated.  LUNGS:  Dense crackles in the right base continuing.. Lung excursion symmetrical. No change in fremitus.  ABDOMEN: Bowel sounds present. Non-tender, no masses palpated.  EXTREMITIES: Normal muscle tone and joint movement, no cyanosis or clubbing.   LYMPHATICS: No adenopathy palpated, no edema.  SKIN: Dry, intact, no lesions.   NEURO: Cranial nerves II-XII intact. Motor strength 5/5 bilaterally, upper and lower extremities.  PSYCH: Appropriate affect.    Assessment:     "   1. Pneumonia of right lower lobe due to infectious organism          The patient is continuing to have dense crackles in the right lower lobe and produce a great deal of mucus.  Despite having a bronchoscopy which was consistent with aspiration, and very concerned about this persisting right lower lobe infiltrate.  Will continue to evaluate this.  She has now been coughing for over year.  Plan:       Pneumonia of right lower lobe due to infectious organism  -     X-Ray Chest PA And Lateral; Future  -     Cytology Specimen- Pulmonary Medical Cytology  -     Cytology Specimen- Pulmonary Medical Cytology  -     Cytology Specimen- Pulmonary Medical Cytology  -     NM PET CT Routine Skull to Mid Thigh; Future           Follow up in about 1 month (around 8/26/2023).

## 2023-07-26 NOTE — PATIENT INSTRUCTIONS
Pneumonia of right lower lobe due to infectious organism  -     X-Ray Chest PA And Lateral; Future  -     Cytology Specimen- Pulmonary Medical Cytology  -     Cytology Specimen- Pulmonary Medical Cytology  -     Cytology Specimen- Pulmonary Medical Cytology  -     NM PET CT Routine Skull to Mid Thigh; Future  Follow up in about 1 month (around 8/26/2023).

## 2023-07-27 ENCOUNTER — TELEPHONE (OUTPATIENT)
Dept: PULMONOLOGY | Facility: CLINIC | Age: 83
End: 2023-07-27

## 2023-07-27 DIAGNOSIS — R91.8 RIGHT LOWER LOBE LUNG MASS: Primary | ICD-10-CM

## 2023-07-27 RX ORDER — SIMVASTATIN 40 MG/1
40 TABLET, FILM COATED ORAL NIGHTLY
Qty: 90 TABLET | Refills: 3 | Status: ON HOLD | OUTPATIENT
Start: 2023-07-27 | End: 2023-11-05 | Stop reason: SINTOL

## 2023-07-27 NOTE — TELEPHONE ENCOUNTER
----- Message from Tricia Lima sent at 7/26/2023  5:01 PM CDT -----  Regarding: PATIENT ORDER NEEDING A NEW CODE THAT WILL MEET MEDICAL NECESSITY    TEST ORDERED:  NM PET CT Routine Skull to Mid Thigh    Please, send a new order with a diagnosis code that meets medical necessity,  or just add a payable code to this order.    FYI:  ##Pneumonia of right lower lobe due to infectious organism [J18.9] is not a payable code for this test##      Thank You !    Tricia Lima  [phone])

## 2023-07-27 NOTE — TELEPHONE ENCOUNTER
No care due was identified.  [facility] Embedded Care Due Messages. Reference number: [id number].   7/27/2023 8:48:08 AM CDT

## 2023-07-31 ENCOUNTER — TELEPHONE (OUTPATIENT)
Dept: PULMONOLOGY | Facility: CLINIC | Age: 83
End: 2023-07-31

## 2023-07-31 DIAGNOSIS — R91.8 RIGHT LOWER LOBE LUNG MASS: Primary | ICD-10-CM

## 2023-07-31 NOTE — TELEPHONE ENCOUNTER
Called patient to tell her she has to recollect her sputums in containers with cytology preservative.  I will make sure she is aware of the need to have the lab give her the correct containers this time.   Called, no answer, left message.

## 2023-07-31 NOTE — TELEPHONE ENCOUNTER
3 sputum specimens were dropped off at our office to [facility] by someone around 11am this morning.  [facility] brought them to me and I asked what they were for who dropped them off and why.  She had no answers for me.    I walked downstairs to the lab and the tech said the specimens were not put in fixative so they can not be processed for cytology.  They said that Dr Leong and Dr Christian had spoken about this already.  The paperwork is on [employer] desk.

## 2023-08-03 ENCOUNTER — TELEPHONE (OUTPATIENT)
Dept: PULMONOLOGY | Facility: CLINIC | Age: 83
End: 2023-08-03

## 2023-08-03 NOTE — TELEPHONE ENCOUNTER
----- Message from [facility]el Palomino sent at 8/3/2023  9:57 AM CDT -----  Regarding: Returned call  Patient called and said that she received a message from  and was returning her call.  Pt stated that it was regarding her test results. She said that she will be back in [place] on Monday if  would like to speak with her. She can be reached at [phone]

## 2023-08-08 ENCOUNTER — LAB VISIT (OUTPATIENT)
Dept: LAB | Facility: HOSPITAL | Age: 83
End: 2023-08-08
Attending: OBSTETRICS & GYNECOLOGY
Payer: MEDICARE

## 2023-08-08 DIAGNOSIS — Z85.43 PERSONAL HISTORY OF MALIGNANT NEOPLASM OF OVARY: Primary | ICD-10-CM

## 2023-08-08 PROCEDURE — 86304 IMMUNOASSAY TUMOR CA 125: CPT | Performed by: OBSTETRICS & GYNECOLOGY

## 2023-08-08 PROCEDURE — 36415 COLL VENOUS BLD VENIPUNCTURE: CPT | Performed by: OBSTETRICS & GYNECOLOGY

## 2023-08-10 LAB — CANCER AG125 SERPL-ACNC: 32.3 U/ML (ref 0–38.1)

## 2023-08-10 NOTE — TELEPHONE ENCOUNTER
Spoke with the patient. She is aware Dr. Christian reached out to let her know she needed to recollect sputums in special cups from the lab

## 2023-08-14 ENCOUNTER — TELEPHONE (OUTPATIENT)
Dept: PAIN MEDICINE | Facility: CLINIC | Age: 83
End: 2023-08-14
Payer: MEDICARE

## 2023-08-14 NOTE — TELEPHONE ENCOUNTER
Spoke with pt advised  of denial. Will do an appeal when I get a denial for her. Thank you Informed OR to cancel

## 2023-08-14 NOTE — TELEPHONE ENCOUNTER
Called and spoke with [employer] this is supposed to go through [employer] not [employer] I called [phone]      [id number].        Called and spoke with [employer] to schedule p2p no p2p available [employer] appeal needs to be done     [phone] will ask  jonatan ceron for the denial so I can make an appeal off of that denial letter.

## 2023-08-14 NOTE — TELEPHONE ENCOUNTER
----- Message from Cassandra Garcia LPN sent at 8/14/2023  6:57 AM CDT -----  Regarding: Denial  Good morning,   [employer] has denied this patient's injection.  A peer to peer can be completed by calling [phone] and referencing [id number].   Thank you,   Cassandra Garcia LPN  [facility]

## 2023-08-15 ENCOUNTER — HOSPITAL ENCOUNTER (OUTPATIENT)
Dept: RADIOLOGY | Facility: HOSPITAL | Age: 83
Discharge: HOME | End: 2023-08-15
Attending: INTERNAL MEDICINE
Payer: MEDICARE

## 2023-08-15 VITALS — HEIGHT: 63 IN | WEIGHT: 125 LBS | BODY MASS INDEX: 22.15 KG/M2

## 2023-08-15 DIAGNOSIS — R91.8 RIGHT LOWER LOBE LUNG MASS: ICD-10-CM

## 2023-08-15 LAB — GLUCOSE SERPL-MCNC: 94 MG/DL (ref 70–110)

## 2023-08-15 PROCEDURE — A9552 F18 FDG: HCPCS | Mod: PO

## 2023-08-17 ENCOUNTER — TELEPHONE (OUTPATIENT)
Dept: PULMONOLOGY | Facility: CLINIC | Age: 83
End: 2023-08-17

## 2023-08-17 NOTE — TELEPHONE ENCOUNTER
Ask patient to come into the office Monday at 2 p.m. so that we can talk about her scans and cytology.

## 2023-08-21 ENCOUNTER — INFUSION (OUTPATIENT)
Dept: INFUSION THERAPY | Facility: HOSPITAL | Age: 83
End: 2023-08-21
Attending: OBSTETRICS & GYNECOLOGY
Payer: MEDICARE

## 2023-08-21 ENCOUNTER — OFFICE VISIT (OUTPATIENT)
Dept: PULMONOLOGY | Facility: CLINIC | Age: 83
End: 2023-08-21
Payer: MEDICARE

## 2023-08-21 VITALS
BODY MASS INDEX: 21.97 KG/M2 | OXYGEN SATURATION: 96 % | HEART RATE: 82 BPM | SYSTOLIC BLOOD PRESSURE: 115 MMHG | WEIGHT: 124 LBS | DIASTOLIC BLOOD PRESSURE: 80 MMHG | HEIGHT: 63 IN

## 2023-08-21 VITALS
RESPIRATION RATE: 18 BRPM | DIASTOLIC BLOOD PRESSURE: 63 MMHG | BODY MASS INDEX: 21.91 KG/M2 | WEIGHT: 123.69 LBS | SYSTOLIC BLOOD PRESSURE: 116 MMHG | HEIGHT: 63 IN | OXYGEN SATURATION: 98 % | TEMPERATURE: 98 F | HEART RATE: 90 BPM

## 2023-08-21 DIAGNOSIS — R94.2 ABNORMAL PET SCAN OF LUNG: Primary | ICD-10-CM

## 2023-08-21 DIAGNOSIS — C56.1 CARCINOMA OF RIGHT OVARY: ICD-10-CM

## 2023-08-21 DIAGNOSIS — Z51.11 MAINTENANCE CHEMOTHERAPY: Primary | ICD-10-CM

## 2023-08-21 DIAGNOSIS — J18.9 PNEUMONIA OF RIGHT LOWER LOBE DUE TO INFECTIOUS ORGANISM: ICD-10-CM

## 2023-08-21 PROCEDURE — 1157F ADVNC CARE PLAN IN RCRD: CPT | Mod: CPTII,S$GLB,, | Performed by: INTERNAL MEDICINE

## 2023-08-21 PROCEDURE — 3079F DIAST BP 80-89 MM HG: CPT | Mod: CPTII,S$GLB,, | Performed by: INTERNAL MEDICINE

## 2023-08-21 PROCEDURE — 25000003 PHARM REV CODE 250: Performed by: OBSTETRICS & GYNECOLOGY

## 2023-08-21 PROCEDURE — 3074F SYST BP LT 130 MM HG: CPT | Mod: CPTII,S$GLB,, | Performed by: INTERNAL MEDICINE

## 2023-08-21 PROCEDURE — 3074F PR MOST RECENT SYSTOLIC BLOOD PRESSURE < 130 MM HG: ICD-10-PCS | Mod: CPTII,S$GLB,, | Performed by: INTERNAL MEDICINE

## 2023-08-21 PROCEDURE — 1157F PR ADVANCE CARE PLAN OR EQUIV PRESENT IN MEDICAL RECORD: ICD-10-PCS | Mod: CPTII,S$GLB,, | Performed by: INTERNAL MEDICINE

## 2023-08-21 PROCEDURE — 99213 OFFICE O/P EST LOW 20 MIN: CPT | Mod: S$GLB,,, | Performed by: INTERNAL MEDICINE

## 2023-08-21 PROCEDURE — 96523 IRRIG DRUG DELIVERY DEVICE: CPT

## 2023-08-21 PROCEDURE — 1126F AMNT PAIN NOTED NONE PRSNT: CPT | Mod: CPTII,S$GLB,, | Performed by: INTERNAL MEDICINE

## 2023-08-21 PROCEDURE — 1159F PR MEDICATION LIST DOCUMENTED IN MEDICAL RECORD: ICD-10-PCS | Mod: CPTII,S$GLB,, | Performed by: INTERNAL MEDICINE

## 2023-08-21 PROCEDURE — 1126F PR PAIN SEVERITY QUANTIFIED, NO PAIN PRESENT: ICD-10-PCS | Mod: CPTII,S$GLB,, | Performed by: INTERNAL MEDICINE

## 2023-08-21 PROCEDURE — A4216 STERILE WATER/SALINE, 10 ML: HCPCS | Performed by: OBSTETRICS & GYNECOLOGY

## 2023-08-21 PROCEDURE — 3079F PR MOST RECENT DIASTOLIC BLOOD PRESSURE 80-89 MM HG: ICD-10-PCS | Mod: CPTII,S$GLB,, | Performed by: INTERNAL MEDICINE

## 2023-08-21 PROCEDURE — 1159F MED LIST DOCD IN RCRD: CPT | Mod: CPTII,S$GLB,, | Performed by: INTERNAL MEDICINE

## 2023-08-21 PROCEDURE — 99213 PR OFFICE/OUTPT VISIT, EST, LEVL III, 20-29 MIN: ICD-10-PCS | Mod: S$GLB,,, | Performed by: INTERNAL MEDICINE

## 2023-08-21 PROCEDURE — 63600175 PHARM REV CODE 636 W HCPCS: Performed by: OBSTETRICS & GYNECOLOGY

## 2023-08-21 RX ORDER — SODIUM CHLORIDE 0.9 % (FLUSH) 0.9 %
10 SYRINGE (ML) INJECTION
Status: DISCONTINUED | OUTPATIENT
Start: 2023-08-21 | End: 2023-08-21 | Stop reason: HOSPADM

## 2023-08-21 RX ORDER — HEPARIN 100 UNIT/ML
500 SYRINGE INTRAVENOUS
Status: COMPLETED | OUTPATIENT
Start: 2023-08-21 | End: 2023-08-21

## 2023-08-21 RX ORDER — HEPARIN 100 UNIT/ML
500 SYRINGE INTRAVENOUS
Status: CANCELLED
Start: 2023-08-21

## 2023-08-21 RX ORDER — SODIUM CHLORIDE 0.9 % (FLUSH) 0.9 %
10 SYRINGE (ML) INJECTION
Status: CANCELLED
Start: 2023-08-21

## 2023-08-21 RX ADMIN — SODIUM CHLORIDE, PRESERVATIVE FREE 10 ML: 5 INJECTION INTRAVENOUS at 11:08

## 2023-08-21 RX ADMIN — HEPARIN 500 UNITS: 100 SYRINGE at 11:08

## 2023-08-21 NOTE — PROGRESS NOTES
SUBJECTIVE:    Patient ID: Alexa Otero is a 83 y.o. female.    Chief Complaint: Follow-up    HPI   The patient's PET scan shows pleural FDG activity and activity in the center of the right lower lobe.  Cytology of her sputum shows degenerating cells.  I am very concerned as her right lower lobe is an airless mass.  Whether this is a malignant mass or dead lung I am not certain.  Either way, it needs to go.  Discussed this with the patient.  Patient is also concerned because her  has risen from 10 to 32.    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    precancer of cervix     Past Surgical History:   Procedure Laterality Date    AUGMENTATION OF BREAST      BRONCHOSCOPY WITH FLUOROSCOPY Right 5/11/2023    Procedure: BRONCHOSCOPY, WITH FLUOROSCOPY;  Surgeon: Neva Christian MD;  Location: [facility];  Service: Pulmonary;  Laterality: Right;    CATARACT EXTRACTION BILATERAL W/ ANTERIOR VITRECTOMY Bilateral     CHOLECYSTECTOMY      COLONOSCOPY      COLONOSCOPY N/A 04/20/2023    Procedure: COLONOSCOPY;  Surgeon: Sabino Stephenson MD;  Location: [facility];  Service: Endoscopy;  Laterality: N/A;    ESOPHAGOGASTRODUODENOSCOPY N/A 06/20/2018    Procedure: EGD (ESOPHAGOGASTRODUODENOSCOPY);  Surgeon: Sabino Stephenson MD;  Location: [facility];  Service: Endoscopy;  Laterality: N/A;    ESOPHAGOGASTRODUODENOSCOPY N/A 03/31/2023    Procedure: EGD (ESOPHAGOGASTRODUODENOSCOPY);  Surgeon: Sabino Stephenson MD;  Location: [facility];  Service: Endoscopy;  Laterality: N/A;    HYSTERECTOMY  1976    partial    INJECTION OF ANESTHETIC AGENT  AROUND MEDIAL BRANCH NERVES INNERVATING LUMBAR FACET JOINT Right 5/10/2023    Procedure: Block-nerve-Lateral-branch-lumbar;  Surgeon: Agustin Robles MD;  Location: [facility];  Service: Pain Management;  Laterality: Right;  L5 and s1,s2 LBB    INJECTION OF ANESTHETIC AGENT AROUND MEDIAL BRANCH NERVES INNERVATING LUMBAR FACET JOINT Right 5/30/2023    Procedure: Block-nerve-medial branch-lumbar;  Surgeon: Agustin Robles MD;  Location: [facility];  Service: Pain Management;  Laterality: Right;  L5, s1 ,s2 LBB #2    INJECTION, SACROILIAC JOINT Right 01/26/2023    Procedure: INJECTION,SACROILIAC JOINT;  Surgeon: Agustin Robles MD;  Location: [facility];  Service: Pain Management;  Laterality: Right;    INSERTION OF TUNNELED CENTRAL VENOUS CATHETER (CVC) WITH SUBCUTANEOUS PORT Right 10/19/2020    Procedure: INSERTION, PORT-A-CATH;  Surgeon: Misti Mcfarland MD;  Location: [facility] OR;  Service: General;  Laterality: Right;    INTRAMEDULLARY RODDING OF TROCHANTER OF FEMUR Right 11/28/2021    Procedure: INSERTION, INTRAMEDULLARY LUC, FEMUR, TROCHANTER/RIGHT TFN DR FAJARDO NOTIFIED REP;  Surgeon: Kp Fajardo MD;  Location: [facility] OR;  Service: Orthopedics;  Laterality: Right;  SKIP    right hip fracture Right     hardware down to thigh    ROBOT-ASSISTED LAPAROSCOPIC LYMPHADENECTOMY USING DA NELLA XI N/A 09/21/2020    Procedure: XI ROBOTIC LYMPHADENECTOMY-pelvic and kell-aortic;  Surgeon: Altagracia Ray MD;  Location: [facility] OR;  Service: OB/GYN;  Laterality: N/A;    ROBOT-ASSISTED LAPAROSCOPIC OMENTECTOMY USING DA NELLA XI N/A 09/21/2020    Procedure: XI ROBOTIC OMENTECTOMY;  Surgeon: Altagracia Ray MD;  Location: [facility] OR;  Service: OB/GYN;  Laterality: N/A;    ROBOT-ASSISTED LAPAROSCOPIC SALPINGO-OOPHORECTOMY USING DA NELLA XI Bilateral 09/21/2020    Procedure: XI ROBOTIC SALPINGO-OOPHORECTOMY;  Surgeon: Altagracia Ray MD;  Location: [facility] OR;  Service: OB/GYN;  Laterality: Bilateral;    UPPER GASTROINTESTINAL ENDOSCOPY        Family History   Problem Relation Age of Onset    Cancer Mother 57        lung    Cancer Father 56        oral    Skin cancer Sister     Skin cancer Brother     Melanoma Brother     Skin cancer Brother     Breast cancer Maternal Grandmother     Breast cancer Paternal Grandmother     Colon polyps Daughter     Psoriasis Neg Hx     Lupus Neg Hx     Eczema Neg Hx     Colon cancer Neg Hx     Crohn's disease Neg Hx     Esophageal cancer Neg Hx     Stomach cancer Neg Hx     Ulcerative colitis Neg Hx         Social History:   Marital Status:   Occupation: Data Unavailable  Alcohol History:  reports current alcohol use of about 1.0 standard drink of alcohol per week.  Tobacco History:  reports that she quit smoking about 42 years ago. Her smoking use included cigarettes. She started smoking about 62 years ago. She has a 20.0 pack-year smoking history. She has never used smokeless tobacco.  Drug History:  reports no history of drug use.    Review of patient's allergies indicates:  No Known Allergies    Current Outpatient Medications   Medication Sig Dispense Refill    benazepriL (LOTENSIN) 40 MG tablet TAKE 1 TABLET(40 MG) BY MOUTH EVERY DAY (Patient taking differently: every evening.) 90 tablet 2    benzonatate (TESSALON) 100 MG capsule Take by mouth 3 (three) times daily as needed.      gabapentin (NEURONTIN) 100 MG capsule TAKE 1 CAPSULE(100 MG) BY MOUTH TWICE DAILY 180 capsule 3    hydroCHLOROthiazide (HYDRODIURIL) 25 MG tablet TAKE 1 TABLET(25 MG) BY MOUTH EVERY DAY 90 tablet 3    melatonin 5 mg Chew Take 2 tablets by mouth every evening.      metFORMIN (GLUCOPHAGE) 500 MG tablet TAKE 1 TABLET(500 MG) BY MOUTH TWICE DAILY WITH MEALS 180 tablet 3    omeprazole (PRILOSEC) 40 MG capsule TAKE 1 CAPSULE(40 MG) BY MOUTH EVERY DAY 90 capsule 3    simvastatin (ZOCOR) 40 MG tablet Take 1 tablet (40 mg total) by mouth every evening. 90 tablet 3    VIT A/VIT C/VIT E/ZINC/COPPER (ICAPS AREDS ORAL) Take 1 capsule by mouth  2 (two) times a day.       chlorthalidone (HYGROTEN) 25 MG Tab Take 25 mg by mouth.       No current facility-administered medications for this visit.       Last PFT: 5/11/23 FEV1 1.72, no obstruction, no restriction, moderate diffusion defect.    Last CT:3/18/23  IMPRESSION:  No CT evidence for pulmonary embolism.   Focal area of airspace opacity/consolidation involving the posterior segment right lower lobe no significantly changed when compared to prior study.   Emphysematous changes.   Prominent right hilar and subcarinal lymph nodes, may be reactive in nature.   Inferior deformity with minimal increase sclerosis at T11 perhaps suggesting old compression deformity.    PET/CT  8/15/23  IMPRESSION:  1. Multifocal right lower lobe and right pleural FDG hypermetabolism as described, with associated right lower lobe consolidation. These are nonspecific and could be of infectious or inflammatory etiology in the clinical setting of chronic pneumonia, and or metastatic disease. Correlation with previous bronchoscopy results is needed.  2. Multiple new non hypermetabolic pulmonary nodules in both lungs, which are generally below size resolution for PET, but suspicious for pulmonary metastases.  3. No additional findings of FDG avid metastatic disease.      Review of Systems  General: Feeling ok  Eyes: Vision is good. She has macular degeneration.  ENT:  No sinusitis or pharyngitis.   Heart:: No chest pain or palpitations.  Lungs: Coughs, produces clear mucus.  She isstill producing a great quantity of clear mucus through the day and night  GI:  Back to alternating diarrhea and constipation  : No dysuria, hesitancy, or nocturia.  Musculoskeletal: has R hip pain  Skin: No lesions or rashes.  Neuro: No headaches or neuropathy.  Lymph: No edema or adenopathy.  Psych: No anxiety or depression.  Endo: Weight stable    OBJECTIVE:      /80 (BP Location: Left arm, Patient Position: Sitting, BP Method: Medium (Manual))    "Pulse 82   Ht 5' 3" (1.6 m)   Wt 56.2 kg (124 lb)   SpO2 96%   BMI 21.97 kg/m²     Physical Exam  GENERAL: Older patient in no distress.  HEENT: Pupils equal and reactive. Extraocular movements intact. Nose intact.      Pharynx moist.  NECK: Supple.   HEART: Regular rate and rhythm. No murmur or gallop auscultated.  LUNGS:  Dense crackles in the right base continuing.. Lung excursion symmetrical. No change in fremitus.  ABDOMEN: Bowel sounds present. Non-tender, no masses palpated.  EXTREMITIES: Normal muscle tone and joint movement, no cyanosis or clubbing.   LYMPHATICS: No adenopathy palpated, no edema.  SKIN: Dry, intact, no lesions.   NEURO: Cranial nerves II-XII intact. Motor strength 5/5 bilaterally, upper and lower extremities.  PSYCH: Appropriate affect.      Cytology, sputum, 8/10/23  SPUTUM   - NEGATIVE FOR MALIGNANT CELLS   - DEBRIS AND DEGENERATING CELLS PRESENT     Assessment:       1. Pneumonia of right lower lobe due to infectious organism    2. Abnormal PET scan of lung            Right lower lobe with PET FDG activity in the right lower lobe and around the right lower lobe.  Cytology showing cellular debris.  The patient's right lower lobe has been down for year.  It is either a malignancy or necrotic lung.  It needs to be resected.  Will send the patient to Dr. Shankar.  Plan:       Pneumonia of right lower lobe due to infectious organism    Abnormal PET scan of lung             Follow up in about 6 weeks (around 10/2/2023).            "

## 2023-08-25 DIAGNOSIS — G47.00 INSOMNIA, UNSPECIFIED TYPE: Primary | ICD-10-CM

## 2023-08-25 RX ORDER — TRAZODONE HYDROCHLORIDE 50 MG/1
50 TABLET ORAL NIGHTLY PRN
Qty: 30 TABLET | Status: ON HOLD | OUTPATIENT
Start: 2023-08-25 | End: 2023-11-05 | Stop reason: HOSPADM

## 2023-08-29 ENCOUNTER — TELEPHONE (OUTPATIENT)
Dept: PULMONOLOGY | Facility: CLINIC | Age: 83
End: 2023-08-29

## 2023-08-29 NOTE — TELEPHONE ENCOUNTER
----- Message from Rena Mccarthy sent at 8/29/2023  3:38 PM CDT -----  Contact: Patient  Type: Needs Medical Advice    Who Called:  Patient  What is this regarding?:  Pt is looking to cancel her apt tomorrow as she was seen last week.  Best Call Back Number:  [phone]  Additional Information:  Please call the patient back at the phone number listed above to advise. Thank you!

## 2023-09-14 NOTE — ANESTHESIA POSTPROCEDURE EVALUATION
32 yo  at 29+1 here for routine ob visit. No contractions, leaking or bleeding. Good fetal movement. Tdap not available in the office today. Plan next visit. Discussed normal third trimester discomforts. Anesthesia Post Evaluation    Patient: Alexa Otero    Procedure(s) Performed: Procedure(s) (LRB):  EGD (ESOPHAGOGASTRODUODENOSCOPY) (N/A)    Final Anesthesia Type: general  Patient location during evaluation: PACU  Patient participation: Yes- Able to Participate  Level of consciousness: awake and alert  Post-procedure vital signs: reviewed and stable  Pain management: adequate  Airway patency: patent  PONV status at discharge: No PONV  Anesthetic complications: no      Cardiovascular status: blood pressure returned to baseline  Respiratory status: unassisted  Hydration status: euvolemic  Follow-up not needed.        Visit Vitals  BP (!) 143/72   Pulse 76   Temp 36.4 °C (97.6 °F)   Resp 16   SpO2 96%   Breastfeeding? No       Pain/Jess Score: Pain Assessment Performed: Yes (6/20/2018 10:18 AM)  Presence of Pain: denies (6/20/2018 10:18 AM)  Pain Rating Prior to Med Admin: 1 (6/20/2018 10:18 AM)  Pain Rating Post Med Admin: 1 (6/20/2018 10:18 AM)  Jess Score: 10 (6/20/2018 10:18 AM)

## 2023-09-15 ENCOUNTER — TELEPHONE (OUTPATIENT)
Dept: CARDIOLOGY | Facility: CLINIC | Age: 83
End: 2023-09-15

## 2023-09-15 NOTE — TELEPHONE ENCOUNTER
Lm joselyn foley that dr wiggins doesn't take new patients, but we can set up with one of our other

## 2023-09-15 NOTE — TELEPHONE ENCOUNTER
----- Message from David Waters sent at 9/15/2023  9:54 AM CDT -----  Contact: Marcie/Dr Shankar's office.  Type: Needs Medical Advice  Who Called:  Marcie/Dr Shankar's office.     Best Call Back Number: [phone]    Additional Information: Called to speak with office about sooner clearance appt for pt. Please call.

## 2023-09-22 ENCOUNTER — TELEPHONE (OUTPATIENT)
Dept: CARDIOLOGY | Facility: CLINIC | Age: 83
End: 2023-09-22
Payer: MEDICARE

## 2023-09-25 RX ORDER — METFORMIN HYDROCHLORIDE 500 MG/1
TABLET ORAL
Qty: 180 TABLET | Refills: 0 | Status: SHIPPED | OUTPATIENT
Start: 2023-09-25 | End: 2023-12-20

## 2023-09-25 NOTE — TELEPHONE ENCOUNTER
Refill Routing Note   Medication(s) are not appropriate for processing by [facility] for the following reason(s):      Drug-disease interaction    [facility] action(s):  Defer Care Due:  None identified     Medication Therapy Plan: FLOS; metFORMIN and Controlled type 2 diabetes mellitus with stage 3 chronic kidney disease, without long-term current use of insulin    Pharmacist review requested: Yes     Appointments  past 12m or future 3m with PCP    Date Provider   Last Visit   5/16/2023 Idalia Greco MD   Next Visit   12/20/2023 Idalia Greco MD   ED visits in past 90 days: 0        Note composed:7:11 AM 09/25/2023

## 2023-09-25 NOTE — TELEPHONE ENCOUNTER
Refill Decision Note   Alexamu Otero  is requesting a refill authorization.  Brief Assessment and Rationale for Refill:  Approve     Medication Therapy Plan:  FLOS    Medication Reconciliation Completed: No   Comments:     No Care Gaps recommended.     Note composed:4:14 PM 09/25/2023

## 2023-10-02 ENCOUNTER — TELEPHONE (OUTPATIENT)
Dept: PULMONOLOGY | Facility: CLINIC | Age: 83
End: 2023-10-02

## 2023-10-02 ENCOUNTER — OFFICE VISIT (OUTPATIENT)
Dept: PULMONOLOGY | Facility: CLINIC | Age: 83
End: 2023-10-02
Payer: MEDICARE

## 2023-10-02 ENCOUNTER — INFUSION (OUTPATIENT)
Dept: INFUSION THERAPY | Facility: HOSPITAL | Age: 83
End: 2023-10-02
Attending: OBSTETRICS & GYNECOLOGY
Payer: MEDICARE

## 2023-10-02 ENCOUNTER — HOSPITAL ENCOUNTER (OUTPATIENT)
Dept: RADIOLOGY | Facility: HOSPITAL | Age: 83
Discharge: HOME | End: 2023-10-02
Attending: INTERNAL MEDICINE
Payer: MEDICARE

## 2023-10-02 VITALS
HEART RATE: 91 BPM | BODY MASS INDEX: 22.06 KG/M2 | RESPIRATION RATE: 17 BRPM | OXYGEN SATURATION: 96 % | WEIGHT: 124.5 LBS | DIASTOLIC BLOOD PRESSURE: 73 MMHG | TEMPERATURE: 98 F | SYSTOLIC BLOOD PRESSURE: 126 MMHG | HEIGHT: 63 IN

## 2023-10-02 VITALS
WEIGHT: 124 LBS | BODY MASS INDEX: 21.97 KG/M2 | OXYGEN SATURATION: 93 % | HEART RATE: 100 BPM | DIASTOLIC BLOOD PRESSURE: 70 MMHG | SYSTOLIC BLOOD PRESSURE: 110 MMHG | HEIGHT: 63 IN

## 2023-10-02 DIAGNOSIS — C56.1 CARCINOMA OF RIGHT OVARY: ICD-10-CM

## 2023-10-02 DIAGNOSIS — J18.9 PNEUMONIA OF RIGHT LOWER LOBE DUE TO INFECTIOUS ORGANISM: Primary | ICD-10-CM

## 2023-10-02 DIAGNOSIS — Z51.11 MAINTENANCE CHEMOTHERAPY: Primary | ICD-10-CM

## 2023-10-02 DIAGNOSIS — J18.9 PNEUMONIA OF RIGHT LOWER LOBE DUE TO INFECTIOUS ORGANISM: ICD-10-CM

## 2023-10-02 PROCEDURE — 1126F AMNT PAIN NOTED NONE PRSNT: CPT | Mod: CPTII,S$GLB,, | Performed by: INTERNAL MEDICINE

## 2023-10-02 PROCEDURE — 3078F DIAST BP <80 MM HG: CPT | Mod: CPTII,S$GLB,, | Performed by: INTERNAL MEDICINE

## 2023-10-02 PROCEDURE — 1126F PR PAIN SEVERITY QUANTIFIED, NO PAIN PRESENT: ICD-10-PCS | Mod: CPTII,S$GLB,, | Performed by: INTERNAL MEDICINE

## 2023-10-02 PROCEDURE — 99214 PR OFFICE/OUTPT VISIT, EST, LEVL IV, 30-39 MIN: ICD-10-PCS | Mod: S$GLB,,, | Performed by: INTERNAL MEDICINE

## 2023-10-02 PROCEDURE — 25000003 PHARM REV CODE 250: Performed by: OBSTETRICS & GYNECOLOGY

## 2023-10-02 PROCEDURE — 3074F SYST BP LT 130 MM HG: CPT | Mod: CPTII,S$GLB,, | Performed by: INTERNAL MEDICINE

## 2023-10-02 PROCEDURE — 99214 OFFICE O/P EST MOD 30 MIN: CPT | Mod: S$GLB,,, | Performed by: INTERNAL MEDICINE

## 2023-10-02 PROCEDURE — 1159F MED LIST DOCD IN RCRD: CPT | Mod: CPTII,S$GLB,, | Performed by: INTERNAL MEDICINE

## 2023-10-02 PROCEDURE — 3078F PR MOST RECENT DIASTOLIC BLOOD PRESSURE < 80 MM HG: ICD-10-PCS | Mod: CPTII,S$GLB,, | Performed by: INTERNAL MEDICINE

## 2023-10-02 PROCEDURE — A4216 STERILE WATER/SALINE, 10 ML: HCPCS | Performed by: OBSTETRICS & GYNECOLOGY

## 2023-10-02 PROCEDURE — 3074F PR MOST RECENT SYSTOLIC BLOOD PRESSURE < 130 MM HG: ICD-10-PCS | Mod: CPTII,S$GLB,, | Performed by: INTERNAL MEDICINE

## 2023-10-02 PROCEDURE — 63600175 PHARM REV CODE 636 W HCPCS: Performed by: OBSTETRICS & GYNECOLOGY

## 2023-10-02 PROCEDURE — 96523 IRRIG DRUG DELIVERY DEVICE: CPT | Mod: XB

## 2023-10-02 PROCEDURE — 1159F PR MEDICATION LIST DOCUMENTED IN MEDICAL RECORD: ICD-10-PCS | Mod: CPTII,S$GLB,, | Performed by: INTERNAL MEDICINE

## 2023-10-02 PROCEDURE — 1157F ADVNC CARE PLAN IN RCRD: CPT | Mod: CPTII,S$GLB,, | Performed by: INTERNAL MEDICINE

## 2023-10-02 PROCEDURE — 1157F PR ADVANCE CARE PLAN OR EQUIV PRESENT IN MEDICAL RECORD: ICD-10-PCS | Mod: CPTII,S$GLB,, | Performed by: INTERNAL MEDICINE

## 2023-10-02 PROCEDURE — 71046 X-RAY EXAM CHEST 2 VIEWS: CPT | Mod: TC

## 2023-10-02 RX ORDER — HEPARIN 100 UNIT/ML
500 SYRINGE INTRAVENOUS
Status: CANCELLED
Start: 2023-10-02

## 2023-10-02 RX ORDER — CLOPIDOGREL BISULFATE 75 MG/1
75 TABLET ORAL DAILY
Status: ON HOLD | COMMUNITY
End: 2023-12-13 | Stop reason: HOSPADM

## 2023-10-02 RX ORDER — SODIUM CHLORIDE 0.9 % (FLUSH) 0.9 %
10 SYRINGE (ML) INJECTION
Status: CANCELLED
Start: 2023-10-02

## 2023-10-02 RX ORDER — HEPARIN 100 UNIT/ML
500 SYRINGE INTRAVENOUS
Status: COMPLETED | OUTPATIENT
Start: 2023-10-02 | End: 2023-10-02

## 2023-10-02 RX ORDER — LEVOFLOXACIN 500 MG/1
500 TABLET, FILM COATED ORAL DAILY
Qty: 7 TABLET | Refills: 0 | Status: SHIPPED | OUTPATIENT
Start: 2023-10-02 | End: 2023-10-13 | Stop reason: CLARIF

## 2023-10-02 RX ORDER — METOPROLOL SUCCINATE 25 MG/1
25 TABLET, EXTENDED RELEASE ORAL DAILY
COMMUNITY

## 2023-10-02 RX ORDER — SODIUM CHLORIDE 0.9 % (FLUSH) 0.9 %
10 SYRINGE (ML) INJECTION
Status: DISCONTINUED | OUTPATIENT
Start: 2023-10-02 | End: 2023-10-02 | Stop reason: HOSPADM

## 2023-10-02 RX ADMIN — HEPARIN 500 UNITS: 100 SYRINGE at 01:10

## 2023-10-02 RX ADMIN — SODIUM CHLORIDE, PRESERVATIVE FREE 10 ML: 5 INJECTION INTRAVENOUS at 01:10

## 2023-10-02 NOTE — TELEPHONE ENCOUNTER
Patient's chest x-ray is significantly worse with infiltrates extending up into the right middle lobe and right upper lobe.  Will call in a week of Levaquin.  Told her.

## 2023-10-02 NOTE — PROGRESS NOTES
SUBJECTIVE:    Patient ID: Alexa Otero is a 83 y.o. female.    Chief Complaint: Follow-up    HPI   The patient returns without having her lobectomy.  Dr Shankar sent her to Dr. Roque who has done one stent and is planning 2 more in 2 weeks.  She has extensive ecchymoses over the R wrist area. No idea when we can get to the lobectomy with new stents.  Can not see anything as her procedure was done at an outpatient center.  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    precancer of cervix     Past Surgical History:   Procedure Laterality Date    AUGMENTATION OF BREAST      BRONCHOSCOPY WITH FLUOROSCOPY Right 5/11/2023    Procedure: BRONCHOSCOPY, WITH FLUOROSCOPY;  Surgeon: Neva Christian MD;  Location: [facility];  Service: Pulmonary;  Laterality: Right;    CATARACT EXTRACTION BILATERAL W/ ANTERIOR VITRECTOMY Bilateral     CHOLECYSTECTOMY      COLONOSCOPY      COLONOSCOPY N/A 04/20/2023    Procedure: COLONOSCOPY;  Surgeon: Sabino Stephenson MD;  Location: [facility];  Service: Endoscopy;  Laterality: N/A;    ESOPHAGOGASTRODUODENOSCOPY N/A 06/20/2018    Procedure: EGD (ESOPHAGOGASTRODUODENOSCOPY);  Surgeon: Sabino Stephenson MD;  Location: [facility];  Service: Endoscopy;  Laterality: N/A;    ESOPHAGOGASTRODUODENOSCOPY N/A 03/31/2023    Procedure: EGD (ESOPHAGOGASTRODUODENOSCOPY);  Surgeon: Sabino Stephenson MD;  Location: [facility];  Service: Endoscopy;  Laterality: N/A;    HYSTERECTOMY  1976    partial    INJECTION OF ANESTHETIC AGENT AROUND MEDIAL BRANCH NERVES INNERVATING LUMBAR FACET JOINT Right 5/10/2023    Procedure:  Block-nerve-Lateral-branch-lumbar;  Surgeon: Agustin Robles MD;  Location: [facility];  Service: Pain Management;  Laterality: Right;  L5 and s1,s2 LBB    INJECTION OF ANESTHETIC AGENT AROUND MEDIAL BRANCH NERVES INNERVATING LUMBAR FACET JOINT Right 5/30/2023    Procedure: Block-nerve-medial branch-lumbar;  Surgeon: Agustin Robles MD;  Location: [facility];  Service: Pain Management;  Laterality: Right;  L5, s1 ,s2 LBB #2    INJECTION, SACROILIAC JOINT Right 01/26/2023    Procedure: INJECTION,SACROILIAC JOINT;  Surgeon: Agustin Robles MD;  Location: [facility];  Service: Pain Management;  Laterality: Right;    INSERTION OF TUNNELED CENTRAL VENOUS CATHETER (CVC) WITH SUBCUTANEOUS PORT Right 10/19/2020    Procedure: INSERTION, PORT-A-CATH;  Surgeon: Misti Mcfarland MD;  Location: [facility];  Service: General;  Laterality: Right;    INTRAMEDULLARY RODDING OF TROCHANTER OF FEMUR Right 11/28/2021    Procedure: INSERTION, INTRAMEDULLARY LUC, FEMUR, TROCHANTER/RIGHT TFN DR FAJARDO NOTIFIED REP;  Surgeon: Kp Fajardo MD;  Location: [facility];  Service: Orthopedics;  Laterality: Right;  SKIP    right hip fracture Right     hardware down to thigh    ROBOT-ASSISTED LAPAROSCOPIC LYMPHADENECTOMY USING DA NELLA XI N/A 09/21/2020    Procedure: XI ROBOTIC LYMPHADENECTOMY-pelvic and kell-aortic;  Surgeon: Altagracia Ray MD;  Location: [facility] OR;  Service: OB/GYN;  Laterality: N/A;    ROBOT-ASSISTED LAPAROSCOPIC OMENTECTOMY USING DA NELLA XI N/A 09/21/2020    Procedure: XI ROBOTIC OMENTECTOMY;  Surgeon: Altagracia Ray MD;  Location: [facility] OR;  Service: OB/GYN;  Laterality: N/A;    ROBOT-ASSISTED LAPAROSCOPIC SALPINGO-OOPHORECTOMY USING DA NELLA XI Bilateral 09/21/2020    Procedure: XI ROBOTIC SALPINGO-OOPHORECTOMY;  Surgeon: Altagracia Ray MD;  Location: [facility] OR;  Service: OB/GYN;  Laterality: Bilateral;    UPPER GASTROINTESTINAL ENDOSCOPY       Family History   Problem Relation Age of Onset    Cancer Mother 57        lung    Cancer  Father 56        oral    Skin cancer Sister     Skin cancer Brother     Melanoma Brother     Skin cancer Brother     Breast cancer Maternal Grandmother     Breast cancer Paternal Grandmother     Colon polyps Daughter     Psoriasis Neg Hx     Lupus Neg Hx     Eczema Neg Hx     Colon cancer Neg Hx     Crohn's disease Neg Hx     Esophageal cancer Neg Hx     Stomach cancer Neg Hx     Ulcerative colitis Neg Hx         Social History:   Marital Status:   Occupation: Data Unavailable  Alcohol History:  reports current alcohol use of about 1.0 standard drink of alcohol per week.  Tobacco History:  reports that she quit smoking about 42 years ago. Her smoking use included cigarettes. She started smoking about 62 years ago. She has a 20.0 pack-year smoking history. She has never used smokeless tobacco.  Drug History:  reports no history of drug use.    Review of patient's allergies indicates:  No Known Allergies    Current Outpatient Medications   Medication Sig Dispense Refill    benazepriL (LOTENSIN) 40 MG tablet TAKE 1 TABLET(40 MG) BY MOUTH EVERY DAY (Patient taking differently: every evening.) 90 tablet 2    benzonatate (TESSALON) 100 MG capsule Take by mouth 3 (three) times daily as needed.      chlorthalidone (HYGROTEN) 25 MG Tab Take 25 mg by mouth.      clopidogreL (PLAVIX) 75 mg tablet Take 75 mg by mouth.      gabapentin (NEURONTIN) 100 MG capsule TAKE 1 CAPSULE(100 MG) BY MOUTH TWICE DAILY 180 capsule 3    hydroCHLOROthiazide (HYDRODIURIL) 25 MG tablet TAKE 1 TABLET(25 MG) BY MOUTH EVERY DAY 90 tablet 3    melatonin 5 mg Chew Take 2 tablets by mouth every evening.      metFORMIN (GLUCOPHAGE) 500 MG tablet TAKE 1 TABLET(500 MG) BY MOUTH TWICE DAILY WITH MEALS 180 tablet 0    metoprolol succinate (TOPROL-XL) 25 MG 24 hr tablet Take 25 mg by mouth.      omeprazole (PRILOSEC) 40 MG capsule TAKE 1 CAPSULE(40 MG) BY MOUTH EVERY DAY 90 capsule 3    simvastatin (ZOCOR) 40 MG tablet Take 1 tablet (40 mg total) by  mouth every evening. 90 tablet 3    traZODone (DESYREL) 50 MG tablet Take 1 tablet (50 mg total) by mouth nightly as needed for Insomnia. 30 tablet PRN    VIT A/VIT C/VIT E/ZINC/COPPER (ICAPS AREDS ORAL) Take 1 capsule by mouth 2 (two) times a day.       vit C/E/Zn/coppr/lutein/zeaxan (OCUVITE LUTEIN AND ZEAXANTHIN ORAL)        No current facility-administered medications for this visit.       Last PFT: 5/11/23 FEV1 1.72, no obstruction, no restriction, moderate diffusion defect.    Last CT:3/18/23  IMPRESSION:  No CT evidence for pulmonary embolism.   Focal area of airspace opacity/consolidation involving the posterior segment right lower lobe no significantly changed when compared to prior study.   Emphysematous changes.   Prominent right hilar and subcarinal lymph nodes, may be reactive in nature.   Inferior deformity with minimal increase sclerosis at T11 perhaps suggesting old compression deformity.    PET/CT  8/15/23  IMPRESSION:  1. Multifocal right lower lobe and right pleural FDG hypermetabolism as described, with associated right lower lobe consolidation. These are nonspecific and could be of infectious or inflammatory etiology in the clinical setting of chronic pneumonia, and or metastatic disease. Correlation with previous bronchoscopy results is needed.  2. Multiple new non hypermetabolic pulmonary nodules in both lungs, which are generally below size resolution for PET, but suspicious for pulmonary metastases.  3. No additional findings of FDG avid metastatic disease.      Review of Systems  General: Feeling ok  Eyes: Vision is good. She has macular degeneration.  ENT:  No sinusitis or pharyngitis.   Heart:: No chest pain or palpitations.  Lungs: Coughs, produces clear mucus.  She isstill producing a great quantity of clear mucus through the day and night  GI:  Back to alternating diarrhea and constipation  : No dysuria, hesitancy, or nocturia.  Musculoskeletal: has R hip pain  Skin: No lesions or  "rashes.  Neuro: No headaches or neuropathy.  Lymph: No edema or adenopathy.  Psych: No anxiety or depression.  Endo: Weight stable    OBJECTIVE:      /70 (BP Location: Left arm, Patient Position: Sitting, BP Method: Medium (Manual))   Pulse 100   Ht 5' 3" (1.6 m)   Wt 56.2 kg (124 lb)   SpO2 (!) 93%   BMI 21.97 kg/m²     Physical Exam  GENERAL: Older patient in no distress.  HEENT: Pupils equal and reactive. Extraocular movements intact. Nose intact.      Pharynx moist.  NECK: Supple.   HEART: Regular rate and rhythm. No murmur or gallop auscultated.  LUNGS:  Dense crackles in the right base continuing.  There are also squeaks in both the right and left base and the right upper posterior thorax. Lung excursion symmetrical. No change in fremitus.  ABDOMEN: Bowel sounds present. Non-tender, no masses palpated.  EXTREMITIES: Normal muscle tone and joint movement, no cyanosis or clubbing.   LYMPHATICS: No adenopathy palpated, no edema.  SKIN: Dry, intact, no lesions.   NEURO: Cranial nerves II-XII intact. Motor strength 5/5 bilaterally, upper and lower extremities.  PSYCH: Appropriate affect.      Cytology, sputum, 8/10/23  SPUTUM   - NEGATIVE FOR MALIGNANT CELLS   - DEBRIS AND DEGENERATING CELLS PRESENT     Assessment:       1. Pneumonia of right lower lobe due to infectious organism              Right lower lobe with PET FDG activity in the right lower lobe and around the right lower lobe.  Cytology showing cellular debris.  The patient's right lower lobe has been down for year.  It is either a malignancy or necrotic lung.  It needs to be resected.  Now the patient is in the midst of coronary artery stenting.  I spoke with Dr. Shankar states he will be able to do her surgery shortly after her stenting with careful bridging of her antiplatelet/anticoagulation.  Plan:       Pneumonia of right lower lobe due to infectious organism  -     X-Ray Chest PA And Lateral; Future               Follow up in about 2 " months (around 12/2/2023).

## 2023-10-02 NOTE — PLAN OF CARE
Problem: Fall Injury Risk  Goal: Absence of Fall and Fall-Related Injury  Outcome: Ongoing, Progressing  Intervention: Identify and Manage Contributors  Flowsheets (Taken 10/2/2023 1346)  Self-Care Promotion: safe use of adaptive equipment encouraged  Medication Review/Management: medications reviewed  Intervention: Promote Injury-Free Environment  Flowsheets (Taken 10/2/2023 1346)  Safety Promotion/Fall Prevention: assistive device/personal item within reach

## 2023-10-03 ENCOUNTER — TELEPHONE (OUTPATIENT)
Dept: PAIN MEDICINE | Facility: CLINIC | Age: 83
End: 2023-10-03
Payer: MEDICARE

## 2023-10-03 NOTE — TELEPHONE ENCOUNTER
----- Message from Carol Jimenez, Patient Care Assistant sent at 10/3/2023 12:00 PM CDT -----  Regarding: advice  Contact: pt  Type: Needs Medical Advice    Who Called:  pt     Pharmacy name and phone #:    [id number] - [place] - [address]  [address]  [place] [address]  Phone: [phone] Fax: [phone]    Best Call Back Number: [phone] (home)     Additional Information: pt states she would like a callback regarding clopidogreL (PLAVIX) 75 mg tablet. Please call to advise. Thanks!

## 2023-10-13 PROBLEM — E83.42 HYPOMAGNESEMIA: Status: ACTIVE | Noted: 2023-10-13

## 2023-10-13 PROBLEM — Z71.89 ACP (ADVANCE CARE PLANNING): Status: ACTIVE | Noted: 2023-10-13

## 2023-10-13 PROBLEM — I46.9 CARDIOPULMONARY ARREST: Status: ACTIVE | Noted: 2023-10-13

## 2023-10-13 PROBLEM — E87.6 HYPOKALEMIA: Status: ACTIVE | Noted: 2023-10-13

## 2023-10-14 PROBLEM — J69.0 ASPIRATION PNEUMONIA OF RIGHT LUNG: Status: ACTIVE | Noted: 2023-10-14

## 2023-10-23 ENCOUNTER — LAB VISIT (OUTPATIENT)
Dept: LAB | Facility: HOSPITAL | Age: 83
End: 2023-10-23
Attending: BEHAVIOR TECHNICIAN
Payer: MEDICARE

## 2023-10-23 DIAGNOSIS — I25.10 CORONARY ATHEROSCLEROSIS OF NATIVE CORONARY ARTERY: Primary | ICD-10-CM

## 2023-10-23 DIAGNOSIS — E83.42 HYPOMAGNESEMIA: ICD-10-CM

## 2023-10-23 LAB
ANION GAP SERPL CALC-SCNC: 10 MMOL/L (ref 8–16)
BUN SERPL-MCNC: 26 MG/DL (ref 8–23)
CALCIUM SERPL-MCNC: 9.2 MG/DL (ref 8.7–10.5)
CHLORIDE SERPL-SCNC: 103 MMOL/L (ref 95–110)
CO2 SERPL-SCNC: 29 MMOL/L (ref 23–29)
CREAT SERPL-MCNC: 1.2 MG/DL (ref 0.5–1.4)
EST. GFR  (NO RACE VARIABLE): 45.2 ML/MIN/1.73 M^2
GLUCOSE SERPL-MCNC: 85 MG/DL (ref 70–110)
MAGNESIUM SERPL-MCNC: 1.3 MG/DL (ref 1.6–2.6)
POTASSIUM SERPL-SCNC: 3.8 MMOL/L (ref 3.5–5.1)
SODIUM SERPL-SCNC: 142 MMOL/L (ref 136–145)

## 2023-10-23 PROCEDURE — 36415 COLL VENOUS BLD VENIPUNCTURE: CPT | Performed by: BEHAVIOR TECHNICIAN

## 2023-10-23 PROCEDURE — 83735 ASSAY OF MAGNESIUM: CPT | Performed by: BEHAVIOR TECHNICIAN

## 2023-10-23 PROCEDURE — 80048 BASIC METABOLIC PNL TOTAL CA: CPT | Performed by: BEHAVIOR TECHNICIAN

## 2023-10-27 ENCOUNTER — ANESTHESIA EVENT (OUTPATIENT)
Dept: SURGERY | Facility: HOSPITAL | Age: 83
DRG: 164 | End: 2023-10-27
Payer: MEDICARE

## 2023-10-27 ENCOUNTER — HOSPITAL ENCOUNTER (OUTPATIENT)
Dept: RADIOLOGY | Facility: HOSPITAL | Age: 83
Discharge: HOME | End: 2023-10-27
Payer: MEDICARE

## 2023-10-27 ENCOUNTER — HOSPITAL ENCOUNTER (OUTPATIENT)
Dept: PREADMISSION TESTING | Facility: HOSPITAL | Age: 83
Discharge: HOME | End: 2023-10-27
Attending: THORACIC SURGERY (CARDIOTHORACIC VASCULAR SURGERY)
Payer: MEDICARE

## 2023-10-27 VITALS
HEART RATE: 86 BPM | HEIGHT: 63 IN | TEMPERATURE: 98 F | RESPIRATION RATE: 18 BRPM | BODY MASS INDEX: 21.26 KG/M2 | DIASTOLIC BLOOD PRESSURE: 76 MMHG | OXYGEN SATURATION: 97 % | WEIGHT: 120 LBS | SYSTOLIC BLOOD PRESSURE: 154 MMHG

## 2023-10-27 DIAGNOSIS — D68.9 COAGULOPATHY: ICD-10-CM

## 2023-10-27 DIAGNOSIS — Z01.818 PRE-OP TESTING: Primary | ICD-10-CM

## 2023-10-27 DIAGNOSIS — Z01.818 PRE-OP TESTING: ICD-10-CM

## 2023-10-27 DIAGNOSIS — Z41.9 SURGERY, ELECTIVE: ICD-10-CM

## 2023-10-27 PROCEDURE — 71046 X-RAY EXAM CHEST 2 VIEWS: CPT | Mod: TC

## 2023-10-27 RX ORDER — CEFAZOLIN SODIUM 2 G/50ML
2 SOLUTION INTRAVENOUS ONCE
Status: CANCELLED | OUTPATIENT
Start: 2023-10-31

## 2023-10-27 RX ORDER — BENAZEPRIL HYDROCHLORIDE 40 MG/1
40 TABLET ORAL NIGHTLY
Status: ON HOLD | COMMUNITY
End: 2023-11-05 | Stop reason: HOSPADM

## 2023-10-27 NOTE — DISCHARGE INSTRUCTIONS
To confirm, Your doctor has instructed you that surgery is scheduled for: Oct. 31     Pre-Op will call the afternoon prior to surgery between 4:00 and 6:00 PM with the final arrival time.       1 Person can come with you the day of surgery.    Please park in the Garage Parking and come through front entrance.      GO TO REGISTRATION     After registration, proceed past gift shop and through glass door ( [facility] [place]) Check in at the nurses station to the left.   Do not eat or drink anything after midnight the night before your surgery - THIS INCLUDES  WATER, GUM, MINTS AND CANDY.  YOU MAY BRUSH YOUR TEETH BUT DO NOT SWALLOW     TAKE ONLY THESE MEDICATIONS WITH A SMALL SIP OF WATER THE MORNING OF YOUR PROCEDURE: Metoprolol Chlorthalidone     Do not take Metformin morning of surgery   Last dose Plavix 10/24/23   Do not remove green blood band prior to surgery       ONLY if you are diabetic, check your sugar in the morning before your procedure.       Do not take any diabetic medicines or insulin the morning of surgery .     PLEASE NOTE:  The surgery schedule has many variables which may affect the time of your surgery case.  Family members should be available if your surgery time changes.  Plan to be here the day of your procedure between 4-6 hours.      DO NOT TAKE THESE MEDICATIONS 5-7 DAYS PRIOR to your procedure or per your surgeon's request: ASPIRIN, ALEVE, ADVIL, IBUPROFEN,  MELY SELTZER, BC , FISH OIL , VITAMIN E, HERBALS  (May take Tylenol)      ONLY if you are prescribed any types of blood thinners such as:  Aspirin, Coumadin, Plavix, Pradaxa, Xarelto, Aggrenox, Effient, Eliquis, Savasya, Brilinta, or any other, ask your surgeon whether you should stop taking them and how long before surgery you should stop.  You may also need to verify with the prescribing physician if it is ok to stop your medication.                                                        IMPORTANT INSTRUCTIONS      Do not smoke,  vape or drink alcoholic beverages 24 hours prior to your procedure.  Shower the night before AND the morning of your procedure with a Chlorhexidine wash such as Hibiclens or Dial antibacterial soap from the neck down.   No lotions, powder or oils on your skin after you shower. DO NOT WEAR DEODORANT   Do not get it on your face or in your eyes.  You may use your own shampoo and face wash. This helps your skin to be as bacteria free as possible.    DO NOT remove hair from the surgery site.  Do not shave the incision site unless you are given specific instructions to do so.    Sleep in a bed with clean sheets.    Do not sleep with a pet in the bed.   If you wear contact lenses, dentures, hearing aids or glasses, bring a container to put them in during surgery and give to a family member for safe keeping.    Please leave all jewelry, piercing's and valuables at home.   Wear rubber soled shoes (no flip flops).  If your doctor has scheduled you for an overnight stay, bring a small overnight bag with any personal items you need.    Make arrangements in advance for transportation home by a responsible adult.      You must make arrangements for transportation, TAXI'S, UBER'S OR LYFTS ARE NOT ALLOWED.        If you have any questions about these instructions, call (Monday - Friday) Pre-Op Admit  Nursing  at [phone] or the Pre-Op Day Surgery Unit at [phone].

## 2023-10-27 NOTE — H&P
Date: October 31, 2023      The patient was reassessed and her preoperative studies were reviewed.  She has remained clinically stable since she was last evaluated in the clinic setting.  She has received additional magnesium oral supplementation.    Plan: Will proceed with right video-assisted thoracoscopy, lung biopsy, and if appropriately indicated, completion right lung lower lobectomy, as previously planned.

## 2023-10-27 NOTE — PRE-PROCEDURE INSTRUCTIONS
Preadmit assessment complete. Review of pt health history and home medications.  Preop education per AVS. Pt voiced understanding       Bruises to arms - also noted lump to right wrist from angiogram site - last dose plavix 1024/23  continuing aspirin - advised not to take benazepril and metformin am of surgery . Advised to take chlorthalidone and metoprolol with sip of water am of surgery .

## 2023-10-30 ENCOUNTER — PATIENT MESSAGE (OUTPATIENT)
Dept: FAMILY MEDICINE | Facility: CLINIC | Age: 83
End: 2023-10-30
Payer: MEDICARE

## 2023-10-31 ENCOUNTER — HOSPITAL ENCOUNTER (INPATIENT)
Facility: HOSPITAL | Age: 83
LOS: 5 days | Discharge: HOME | DRG: 164 | End: 2023-11-05
Attending: THORACIC SURGERY (CARDIOTHORACIC VASCULAR SURGERY) | Admitting: THORACIC SURGERY (CARDIOTHORACIC VASCULAR SURGERY)
Payer: MEDICARE

## 2023-10-31 ENCOUNTER — ANESTHESIA (OUTPATIENT)
Dept: SURGERY | Facility: HOSPITAL | Age: 83
DRG: 164 | End: 2023-10-31
Payer: MEDICARE

## 2023-10-31 DIAGNOSIS — Z90.2 STATUS POST LOBECTOMY OF LUNG: ICD-10-CM

## 2023-10-31 DIAGNOSIS — Z01.818 PREOP TESTING: Primary | ICD-10-CM

## 2023-10-31 DIAGNOSIS — Z41.9 SURGERY, ELECTIVE: ICD-10-CM

## 2023-10-31 DIAGNOSIS — I48.91 AFIB: ICD-10-CM

## 2023-10-31 LAB
ANION GAP SERPL CALC-SCNC: 7 MMOL/L (ref 8–16)
BASOPHILS # BLD AUTO: 0.03 K/UL (ref 0–0.2)
BASOPHILS NFR BLD: 0.2 % (ref 0–1.9)
BUN SERPL-MCNC: 22 MG/DL (ref 8–23)
CALCIUM SERPL-MCNC: 8.6 MG/DL (ref 8.7–10.5)
CHLORIDE SERPL-SCNC: 106 MMOL/L (ref 95–110)
CO2 SERPL-SCNC: 24 MMOL/L (ref 23–29)
CREAT SERPL-MCNC: 1 MG/DL (ref 0.5–1.4)
DIFFERENTIAL METHOD BLD: ABNORMAL
EOSINOPHIL # BLD AUTO: 0 K/UL (ref 0–0.5)
EOSINOPHIL NFR BLD: 0 % (ref 0–8)
ERYTHROCYTE [DISTWIDTH] IN BLOOD BY AUTOMATED COUNT: 15.1 % (ref 11.5–14.5)
EST. GFR  (NO RACE VARIABLE): 56.2 ML/MIN/1.73 M^2
GLUCOSE SERPL-MCNC: 108 MG/DL (ref 70–110)
GLUCOSE SERPL-MCNC: 114 MG/DL (ref 70–110)
GLUCOSE SERPL-MCNC: 120 MG/DL (ref 70–110)
GLUCOSE SERPL-MCNC: 143 MG/DL (ref 70–110)
GLUCOSE SERPL-MCNC: 147 MG/DL (ref 70–110)
GLUCOSE SERPL-MCNC: 183 MG/DL (ref 70–110)
HCO3 UR-SCNC: 24.5 MMOL/L (ref 24–28)
HCO3 UR-SCNC: 26.9 MMOL/L (ref 24–28)
HCO3 UR-SCNC: 27.2 MMOL/L (ref 24–28)
HCT VFR BLD AUTO: 34.9 % (ref 37–48.5)
HCT VFR BLD CALC: 25 %PCV (ref 36–54)
HCT VFR BLD CALC: 26 %PCV (ref 36–54)
HCT VFR BLD CALC: 26 %PCV (ref 36–54)
HGB BLD-MCNC: 11.4 G/DL (ref 12–16)
IMM GRANULOCYTES # BLD AUTO: 0.06 K/UL (ref 0–0.04)
IMM GRANULOCYTES NFR BLD AUTO: 0.4 % (ref 0–0.5)
LYMPHOCYTES # BLD AUTO: 0.5 K/UL (ref 1–4.8)
LYMPHOCYTES NFR BLD: 4 % (ref 18–48)
MAGNESIUM SERPL-MCNC: 1.5 MG/DL (ref 1.6–2.6)
MCH RBC QN AUTO: 29 PG (ref 27–31)
MCHC RBC AUTO-ENTMCNC: 32.7 G/DL (ref 32–36)
MCV RBC AUTO: 89 FL (ref 82–98)
MONOCYTES # BLD AUTO: 0.7 K/UL (ref 0.3–1)
MONOCYTES NFR BLD: 5 % (ref 4–15)
NEUTROPHILS # BLD AUTO: 12.4 K/UL (ref 1.8–7.7)
NEUTROPHILS NFR BLD: 90.4 % (ref 38–73)
NRBC BLD-RTO: 0 /100 WBC
PCO2 BLDA: 39.8 MMHG (ref 35–45)
PCO2 BLDA: 42.2 MMHG (ref 35–45)
PCO2 BLDA: 45.9 MMHG (ref 35–45)
PH SMN: 7.33 [PH] (ref 7.35–7.45)
PH SMN: 7.41 [PH] (ref 7.35–7.45)
PH SMN: 7.44 [PH] (ref 7.35–7.45)
PLATELET # BLD AUTO: 247 K/UL (ref 150–450)
PMV BLD AUTO: 9.2 FL (ref 9.2–12.9)
PO2 BLDA: 152 MMHG (ref 80–100)
PO2 BLDA: 414 MMHG (ref 80–100)
PO2 BLDA: 430 MMHG (ref 80–100)
POC ACTIVATED CLOTTING TIME K: 137 SEC (ref 74–137)
POC BE: -1 MMOL/L
POC BE: 2 MMOL/L
POC BE: 3 MMOL/L
POC IONIZED CALCIUM: 1.19 MMOL/L (ref 1.06–1.42)
POC IONIZED CALCIUM: 1.19 MMOL/L (ref 1.06–1.42)
POC IONIZED CALCIUM: 1.22 MMOL/L (ref 1.06–1.42)
POC SATURATED O2: 100 % (ref 95–100)
POC SATURATED O2: 100 % (ref 95–100)
POC SATURATED O2: 99 % (ref 95–100)
POC TCO2: 26 MMOL/L (ref 23–27)
POC TCO2: 28 MMOL/L (ref 23–27)
POC TCO2: 28 MMOL/L (ref 23–27)
POTASSIUM BLD-SCNC: 2.8 MMOL/L (ref 3.5–5.1)
POTASSIUM BLD-SCNC: 2.9 MMOL/L (ref 3.5–5.1)
POTASSIUM BLD-SCNC: 4.2 MMOL/L (ref 3.5–5.1)
POTASSIUM SERPL-SCNC: 3.8 MMOL/L (ref 3.5–5.1)
POTASSIUM SERPL-SCNC: 3.9 MMOL/L (ref 3.5–5.1)
RBC # BLD AUTO: 3.93 M/UL (ref 4–5.4)
SAMPLE: ABNORMAL
SAMPLE: NORMAL
SODIUM BLD-SCNC: 139 MMOL/L (ref 136–145)
SODIUM BLD-SCNC: 139 MMOL/L (ref 136–145)
SODIUM BLD-SCNC: 140 MMOL/L (ref 136–145)
SODIUM SERPL-SCNC: 137 MMOL/L (ref 136–145)
WBC # BLD AUTO: 13.67 K/UL (ref 3.9–12.7)

## 2023-10-31 PROCEDURE — 87116 MYCOBACTERIA CULTURE: CPT | Performed by: THORACIC SURGERY (CARDIOTHORACIC VASCULAR SURGERY)

## 2023-10-31 PROCEDURE — 37000009 HC ANESTHESIA EA ADD 15 MINS: Performed by: THORACIC SURGERY (CARDIOTHORACIC VASCULAR SURGERY)

## 2023-10-31 PROCEDURE — 87070 CULTURE OTHR SPECIMN AEROBIC: CPT | Performed by: THORACIC SURGERY (CARDIOTHORACIC VASCULAR SURGERY)

## 2023-10-31 PROCEDURE — 99223 1ST HOSP IP/OBS HIGH 75: CPT | Mod: ,,, | Performed by: INTERNAL MEDICINE

## 2023-10-31 PROCEDURE — 25000003 PHARM REV CODE 250: Performed by: ANESTHESIOLOGY

## 2023-10-31 PROCEDURE — 71000039 HC RECOVERY, EACH ADD'L HOUR: Performed by: THORACIC SURGERY (CARDIOTHORACIC VASCULAR SURGERY)

## 2023-10-31 PROCEDURE — D9220A PRA ANESTHESIA: ICD-10-PCS | Mod: ANES,,, | Performed by: ANESTHESIOLOGY

## 2023-10-31 PROCEDURE — 63600175 PHARM REV CODE 636 W HCPCS: Performed by: NURSE ANESTHETIST, CERTIFIED REGISTERED

## 2023-10-31 PROCEDURE — D9220A PRA ANESTHESIA: Mod: CRNA,,, | Performed by: NURSE ANESTHETIST, CERTIFIED REGISTERED

## 2023-10-31 PROCEDURE — 87075 CULTR BACTERIA EXCEPT BLOOD: CPT | Performed by: THORACIC SURGERY (CARDIOTHORACIC VASCULAR SURGERY)

## 2023-10-31 PROCEDURE — D9220A PRA ANESTHESIA: ICD-10-PCS | Mod: CRNA,,, | Performed by: NURSE ANESTHETIST, CERTIFIED REGISTERED

## 2023-10-31 PROCEDURE — 07B70ZX EXCISION OF THORAX LYMPHATIC, OPEN APPROACH, DIAGNOSTIC: ICD-10-PCS | Performed by: THORACIC SURGERY (CARDIOTHORACIC VASCULAR SURGERY)

## 2023-10-31 PROCEDURE — 86920 COMPATIBILITY TEST SPIN: CPT | Performed by: THORACIC SURGERY (CARDIOTHORACIC VASCULAR SURGERY)

## 2023-10-31 PROCEDURE — 71000033 HC RECOVERY, INTIAL HOUR: Performed by: THORACIC SURGERY (CARDIOTHORACIC VASCULAR SURGERY)

## 2023-10-31 PROCEDURE — 27000080 OPTIME MED/SURG SUP & DEVICES GENERAL CLASSIFICATION: Performed by: THORACIC SURGERY (CARDIOTHORACIC VASCULAR SURGERY)

## 2023-10-31 PROCEDURE — 25000003 PHARM REV CODE 250: Performed by: NURSE ANESTHETIST, CERTIFIED REGISTERED

## 2023-10-31 PROCEDURE — 99900031 HC PATIENT EDUCATION (STAT)

## 2023-10-31 PROCEDURE — 36620 INSERTION CATHETER ARTERY: CPT | Mod: 59,,, | Performed by: ANESTHESIOLOGY

## 2023-10-31 PROCEDURE — 63600175 PHARM REV CODE 636 W HCPCS: Performed by: ANESTHESIOLOGY

## 2023-10-31 PROCEDURE — 80048 BASIC METABOLIC PNL TOTAL CA: CPT | Performed by: THORACIC SURGERY (CARDIOTHORACIC VASCULAR SURGERY)

## 2023-10-31 PROCEDURE — D9220A PRA ANESTHESIA: Mod: ANES,,, | Performed by: ANESTHESIOLOGY

## 2023-10-31 PROCEDURE — 83735 ASSAY OF MAGNESIUM: CPT | Performed by: THORACIC SURGERY (CARDIOTHORACIC VASCULAR SURGERY)

## 2023-10-31 PROCEDURE — 36620 ARTERIAL: ICD-10-PCS | Mod: 59,,, | Performed by: ANESTHESIOLOGY

## 2023-10-31 PROCEDURE — 84132 ASSAY OF SERUM POTASSIUM: CPT | Performed by: THORACIC SURGERY (CARDIOTHORACIC VASCULAR SURGERY)

## 2023-10-31 PROCEDURE — 25000003 PHARM REV CODE 250: Performed by: THORACIC SURGERY (CARDIOTHORACIC VASCULAR SURGERY)

## 2023-10-31 PROCEDURE — 87206 SMEAR FLUORESCENT/ACID STAI: CPT | Performed by: THORACIC SURGERY (CARDIOTHORACIC VASCULAR SURGERY)

## 2023-10-31 PROCEDURE — 3E0T3BZ INTRODUCTION OF ANESTHETIC AGENT INTO PERIPHERAL NERVES AND PLEXI, PERCUTANEOUS APPROACH: ICD-10-PCS | Performed by: THORACIC SURGERY (CARDIOTHORACIC VASCULAR SURGERY)

## 2023-10-31 PROCEDURE — P9016 RBC LEUKOCYTES REDUCED: HCPCS | Performed by: THORACIC SURGERY (CARDIOTHORACIC VASCULAR SURGERY)

## 2023-10-31 PROCEDURE — 36000710: Performed by: THORACIC SURGERY (CARDIOTHORACIC VASCULAR SURGERY)

## 2023-10-31 PROCEDURE — C1729 CATH, DRAINAGE: HCPCS | Performed by: THORACIC SURGERY (CARDIOTHORACIC VASCULAR SURGERY)

## 2023-10-31 PROCEDURE — 20000000 HC ICU ROOM

## 2023-10-31 PROCEDURE — 27000221 HC OXYGEN, UP TO 24 HOURS

## 2023-10-31 PROCEDURE — 85025 COMPLETE CBC W/AUTO DIFF WBC: CPT | Performed by: THORACIC SURGERY (CARDIOTHORACIC VASCULAR SURGERY)

## 2023-10-31 PROCEDURE — 63600175 PHARM REV CODE 636 W HCPCS: Performed by: THORACIC SURGERY (CARDIOTHORACIC VASCULAR SURGERY)

## 2023-10-31 PROCEDURE — 0BTF0ZZ RESECTION OF RIGHT LOWER LUNG LOBE, OPEN APPROACH: ICD-10-PCS | Performed by: THORACIC SURGERY (CARDIOTHORACIC VASCULAR SURGERY)

## 2023-10-31 PROCEDURE — 27201423 OPTIME MED/SURG SUP & DEVICES STERILE SUPPLY: Performed by: THORACIC SURGERY (CARDIOTHORACIC VASCULAR SURGERY)

## 2023-10-31 PROCEDURE — 87102 FUNGUS ISOLATION CULTURE: CPT | Performed by: THORACIC SURGERY (CARDIOTHORACIC VASCULAR SURGERY)

## 2023-10-31 PROCEDURE — 36000711: Performed by: THORACIC SURGERY (CARDIOTHORACIC VASCULAR SURGERY)

## 2023-10-31 PROCEDURE — 82962 GLUCOSE BLOOD TEST: CPT | Performed by: THORACIC SURGERY (CARDIOTHORACIC VASCULAR SURGERY)

## 2023-10-31 PROCEDURE — 87205 SMEAR GRAM STAIN: CPT | Performed by: THORACIC SURGERY (CARDIOTHORACIC VASCULAR SURGERY)

## 2023-10-31 PROCEDURE — 88305 TISSUE EXAM BY PATHOLOGIST: CPT | Mod: TC | Performed by: PATHOLOGY

## 2023-10-31 PROCEDURE — 37000008 HC ANESTHESIA 1ST 15 MINUTES: Performed by: THORACIC SURGERY (CARDIOTHORACIC VASCULAR SURGERY)

## 2023-10-31 RX ORDER — DEXMEDETOMIDINE HYDROCHLORIDE 100 UG/ML
INJECTION, SOLUTION INTRAVENOUS
Status: DISCONTINUED | OUTPATIENT
Start: 2023-10-31 | End: 2023-10-31

## 2023-10-31 RX ORDER — FENTANYL CITRATE 50 UG/ML
25 INJECTION, SOLUTION INTRAMUSCULAR; INTRAVENOUS EVERY 5 MIN PRN
Status: DISCONTINUED | OUTPATIENT
Start: 2023-10-31 | End: 2023-10-31 | Stop reason: HOSPADM

## 2023-10-31 RX ORDER — OXYCODONE HYDROCHLORIDE 5 MG/1
5 TABLET ORAL
Status: DISCONTINUED | OUTPATIENT
Start: 2023-10-31 | End: 2023-10-31 | Stop reason: HOSPADM

## 2023-10-31 RX ORDER — GUAIFENESIN 600 MG/1
600 TABLET, EXTENDED RELEASE ORAL 2 TIMES DAILY
Status: DISCONTINUED | OUTPATIENT
Start: 2023-10-31 | End: 2023-11-05 | Stop reason: HOSPADM

## 2023-10-31 RX ORDER — GABAPENTIN 100 MG/1
100 CAPSULE ORAL 2 TIMES DAILY
Status: DISCONTINUED | OUTPATIENT
Start: 2023-10-31 | End: 2023-11-05 | Stop reason: HOSPADM

## 2023-10-31 RX ORDER — DIPHENHYDRAMINE HYDROCHLORIDE 50 MG/ML
12.5 INJECTION INTRAMUSCULAR; INTRAVENOUS ONCE AS NEEDED
Status: DISCONTINUED | OUTPATIENT
Start: 2023-10-31 | End: 2023-10-31 | Stop reason: HOSPADM

## 2023-10-31 RX ORDER — ONDANSETRON HYDROCHLORIDE 2 MG/ML
4 INJECTION, SOLUTION INTRAVENOUS DAILY PRN
Status: DISCONTINUED | OUTPATIENT
Start: 2023-10-31 | End: 2023-10-31 | Stop reason: HOSPADM

## 2023-10-31 RX ORDER — MIDAZOLAM HYDROCHLORIDE 1 MG/ML
INJECTION INTRAMUSCULAR; INTRAVENOUS
Status: DISCONTINUED | OUTPATIENT
Start: 2023-10-31 | End: 2023-10-31

## 2023-10-31 RX ORDER — DEXMEDETOMIDINE HYDROCHLORIDE 100 UG/ML
INJECTION, SOLUTION INTRAVENOUS
Status: DISPENSED
Start: 2023-10-31 | End: 2023-10-31

## 2023-10-31 RX ORDER — FENTANYL CITRATE 50 UG/ML
INJECTION, SOLUTION INTRAMUSCULAR; INTRAVENOUS
Status: DISCONTINUED | OUTPATIENT
Start: 2023-10-31 | End: 2023-10-31

## 2023-10-31 RX ORDER — HYDROCODONE BITARTRATE AND ACETAMINOPHEN 7.5; 325 MG/1; MG/1
1 TABLET ORAL EVERY 4 HOURS PRN
Status: DISCONTINUED | OUTPATIENT
Start: 2023-10-31 | End: 2023-11-05 | Stop reason: HOSPADM

## 2023-10-31 RX ORDER — CEFAZOLIN SODIUM 2 G/50ML
2 SOLUTION INTRAVENOUS ONCE
Status: COMPLETED | OUTPATIENT
Start: 2023-10-31 | End: 2023-10-31

## 2023-10-31 RX ORDER — ONDANSETRON 4 MG/1
4 TABLET, ORALLY DISINTEGRATING ORAL EVERY 6 HOURS PRN
Status: DISCONTINUED | OUTPATIENT
Start: 2023-10-31 | End: 2023-11-05 | Stop reason: HOSPADM

## 2023-10-31 RX ORDER — MAGNESIUM SULFATE HEPTAHYDRATE 500 MG/ML
1 INJECTION, SOLUTION INTRAMUSCULAR; INTRAVENOUS ONCE
Status: DISCONTINUED | OUTPATIENT
Start: 2023-10-31 | End: 2023-10-31

## 2023-10-31 RX ORDER — DEXMEDETOMIDINE HYDROCHLORIDE 100 UG/ML
15 INJECTION, SOLUTION INTRAVENOUS ONCE
Status: DISCONTINUED | OUTPATIENT
Start: 2023-10-31 | End: 2023-11-01

## 2023-10-31 RX ORDER — SODIUM CHLORIDE, SODIUM LACTATE, POTASSIUM CHLORIDE, CALCIUM CHLORIDE 600; 310; 30; 20 MG/100ML; MG/100ML; MG/100ML; MG/100ML
1000 INJECTION, SOLUTION INTRAVENOUS ONCE
Status: DISCONTINUED | OUTPATIENT
Start: 2023-10-31 | End: 2023-11-01

## 2023-10-31 RX ORDER — HYDROCODONE BITARTRATE AND ACETAMINOPHEN 5; 325 MG/1; MG/1
1 TABLET ORAL EVERY 4 HOURS PRN
Status: DISCONTINUED | OUTPATIENT
Start: 2023-10-31 | End: 2023-11-05 | Stop reason: HOSPADM

## 2023-10-31 RX ORDER — LIDOCAINE HYDROCHLORIDE 20 MG/ML
JELLY TOPICAL
Status: DISCONTINUED | OUTPATIENT
Start: 2023-10-31 | End: 2023-10-31

## 2023-10-31 RX ORDER — FAMOTIDINE 10 MG/ML
INJECTION INTRAVENOUS
Status: DISCONTINUED | OUTPATIENT
Start: 2023-10-31 | End: 2023-10-31

## 2023-10-31 RX ORDER — PHENYLEPHRINE HYDROCHLORIDE 10 MG/ML
INJECTION INTRAVENOUS
Status: DISCONTINUED | OUTPATIENT
Start: 2023-10-31 | End: 2023-10-31

## 2023-10-31 RX ORDER — ASPIRIN 81 MG/1
81 TABLET ORAL DAILY
Status: DISCONTINUED | OUTPATIENT
Start: 2023-10-31 | End: 2023-11-05 | Stop reason: HOSPADM

## 2023-10-31 RX ORDER — ROCURONIUM BROMIDE 10 MG/ML
INJECTION, SOLUTION INTRAVENOUS
Status: DISCONTINUED | OUTPATIENT
Start: 2023-10-31 | End: 2023-10-31

## 2023-10-31 RX ORDER — DIPHENHYDRAMINE HYDROCHLORIDE 50 MG/ML
INJECTION INTRAMUSCULAR; INTRAVENOUS
Status: DISCONTINUED | OUTPATIENT
Start: 2023-10-31 | End: 2023-10-31

## 2023-10-31 RX ORDER — MUPIROCIN 20 MG/G
1 OINTMENT TOPICAL 2 TIMES DAILY
Status: DISPENSED | OUTPATIENT
Start: 2023-10-31 | End: 2023-11-05

## 2023-10-31 RX ORDER — FENTANYL 25 UG/1
1 PATCH TRANSDERMAL
Status: DISCONTINUED | OUTPATIENT
Start: 2023-10-31 | End: 2023-10-31 | Stop reason: HOSPADM

## 2023-10-31 RX ORDER — MAGNESIUM SULFATE 1 G/100ML
1 INJECTION INTRAVENOUS ONCE
Status: COMPLETED | OUTPATIENT
Start: 2023-10-31 | End: 2023-10-31

## 2023-10-31 RX ORDER — CEFAZOLIN SODIUM 1 G/3ML
INJECTION, POWDER, FOR SOLUTION INTRAMUSCULAR; INTRAVENOUS
Status: DISCONTINUED | OUTPATIENT
Start: 2023-10-31 | End: 2023-10-31 | Stop reason: HOSPADM

## 2023-10-31 RX ORDER — SCOLOPAMINE TRANSDERMAL SYSTEM 1 MG/1
1 PATCH, EXTENDED RELEASE TRANSDERMAL ONCE
Status: DISCONTINUED | OUTPATIENT
Start: 2023-10-31 | End: 2023-11-01

## 2023-10-31 RX ORDER — METOPROLOL SUCCINATE 25 MG/1
25 TABLET, EXTENDED RELEASE ORAL DAILY
Status: DISCONTINUED | OUTPATIENT
Start: 2023-11-01 | End: 2023-11-05 | Stop reason: HOSPADM

## 2023-10-31 RX ORDER — CLOPIDOGREL BISULFATE 75 MG/1
75 TABLET ORAL DAILY
Status: DISCONTINUED | OUTPATIENT
Start: 2023-11-01 | End: 2023-11-01

## 2023-10-31 RX ORDER — DEXAMETHASONE SODIUM PHOSPHATE 4 MG/ML
INJECTION, SOLUTION INTRA-ARTICULAR; INTRALESIONAL; INTRAMUSCULAR; INTRAVENOUS; SOFT TISSUE
Status: DISCONTINUED | OUTPATIENT
Start: 2023-10-31 | End: 2023-10-31

## 2023-10-31 RX ORDER — PANTOPRAZOLE SODIUM 40 MG/1
40 TABLET, DELAYED RELEASE ORAL DAILY
Status: DISCONTINUED | OUTPATIENT
Start: 2023-10-31 | End: 2023-11-05 | Stop reason: HOSPADM

## 2023-10-31 RX ORDER — LIDOCAINE HYDROCHLORIDE 20 MG/ML
INJECTION, SOLUTION EPIDURAL; INFILTRATION; INTRACAUDAL; PERINEURAL
Status: DISCONTINUED | OUTPATIENT
Start: 2023-10-31 | End: 2023-10-31

## 2023-10-31 RX ORDER — BUPIVACAINE HYDROCHLORIDE 5 MG/ML
INJECTION, SOLUTION EPIDURAL; INTRACAUDAL
Status: DISCONTINUED | OUTPATIENT
Start: 2023-10-31 | End: 2023-10-31 | Stop reason: HOSPADM

## 2023-10-31 RX ORDER — ONDANSETRON 2 MG/ML
INJECTION INTRAMUSCULAR; INTRAVENOUS
Status: DISCONTINUED | OUTPATIENT
Start: 2023-10-31 | End: 2023-10-31

## 2023-10-31 RX ORDER — PHENYLEPHRINE HCL IN 0.9% NACL 1 MG/10 ML
SYRINGE (ML) INTRAVENOUS
Status: DISCONTINUED | OUTPATIENT
Start: 2023-10-31 | End: 2023-10-31

## 2023-10-31 RX ORDER — ACETAMINOPHEN 10 MG/ML
INJECTION, SOLUTION INTRAVENOUS
Status: DISCONTINUED | OUTPATIENT
Start: 2023-10-31 | End: 2023-10-31

## 2023-10-31 RX ORDER — PROPOFOL 10 MG/ML
VIAL (ML) INTRAVENOUS
Status: DISCONTINUED | OUTPATIENT
Start: 2023-10-31 | End: 2023-10-31

## 2023-10-31 RX ORDER — SUCCINYLCHOLINE CHLORIDE 20 MG/ML
INJECTION INTRAMUSCULAR; INTRAVENOUS
Status: DISCONTINUED | OUTPATIENT
Start: 2023-10-31 | End: 2023-10-31

## 2023-10-31 RX ORDER — ATORVASTATIN CALCIUM 40 MG/1
40 TABLET, FILM COATED ORAL NIGHTLY
Status: DISCONTINUED | OUTPATIENT
Start: 2023-10-31 | End: 2023-11-05 | Stop reason: HOSPADM

## 2023-10-31 RX ADMIN — FENTANYL TRANSDERMAL 1 PATCH: 25 PATCH, EXTENDED RELEASE TRANSDERMAL at 09:10

## 2023-10-31 RX ADMIN — Medication 100 MCG: at 07:10

## 2023-10-31 RX ADMIN — SCOPALAMINE 1 PATCH: 1 PATCH, EXTENDED RELEASE TRANSDERMAL at 06:10

## 2023-10-31 RX ADMIN — MAGNESIUM SULFATE HEPTAHYDRATE 1 G: 1 INJECTION, SOLUTION INTRAVENOUS at 09:10

## 2023-10-31 RX ADMIN — SODIUM CHLORIDE: 0.9 INJECTION, SOLUTION INTRAVENOUS at 10:10

## 2023-10-31 RX ADMIN — LIDOCAINE HYDROCHLORIDE 1 EACH: 20 JELLY TOPICAL at 07:10

## 2023-10-31 RX ADMIN — OXYCODONE HYDROCHLORIDE 5 MG: 5 TABLET ORAL at 11:10

## 2023-10-31 RX ADMIN — Medication 120 MG: at 07:10

## 2023-10-31 RX ADMIN — Medication 100 MCG: at 10:10

## 2023-10-31 RX ADMIN — FAMOTIDINE 20 MG: 10 INJECTION, SOLUTION INTRAVENOUS at 08:10

## 2023-10-31 RX ADMIN — ACETAMINOPHEN 1000 MG: 10 INJECTION, SOLUTION INTRAVENOUS at 10:10

## 2023-10-31 RX ADMIN — FENTANYL CITRATE 25 MCG: 50 INJECTION, SOLUTION INTRAMUSCULAR; INTRAVENOUS at 11:10

## 2023-10-31 RX ADMIN — GUAIFENESIN 600 MG: 600 TABLET, EXTENDED RELEASE ORAL at 09:10

## 2023-10-31 RX ADMIN — ONDANSETRON 4 MG: 2 INJECTION INTRAMUSCULAR; INTRAVENOUS at 08:10

## 2023-10-31 RX ADMIN — FENTANYL CITRATE 50 MCG: 50 INJECTION, SOLUTION INTRAMUSCULAR; INTRAVENOUS at 08:10

## 2023-10-31 RX ADMIN — PHENYLEPHRINE HYDROCHLORIDE 100 MCG: 10 INJECTION INTRAVENOUS at 09:10

## 2023-10-31 RX ADMIN — Medication 100 MCG: at 09:10

## 2023-10-31 RX ADMIN — SODIUM CHLORIDE, SODIUM LACTATE, POTASSIUM CHLORIDE, AND CALCIUM CHLORIDE: .6; .31; .03; .02 INJECTION, SOLUTION INTRAVENOUS at 07:10

## 2023-10-31 RX ADMIN — DEXMEDETOMIDINE HYDROCHLORIDE 8 MCG: 100 INJECTION, SOLUTION INTRAVENOUS at 09:10

## 2023-10-31 RX ADMIN — PROPOFOL 140 MG: 10 INJECTION, EMULSION INTRAVENOUS at 07:10

## 2023-10-31 RX ADMIN — ATORVASTATIN CALCIUM 40 MG: 40 TABLET, FILM COATED ORAL at 09:10

## 2023-10-31 RX ADMIN — FENTANYL CITRATE 25 MCG: 50 INJECTION, SOLUTION INTRAMUSCULAR; INTRAVENOUS at 07:10

## 2023-10-31 RX ADMIN — DIPHENHYDRAMINE HYDROCHLORIDE 6.25 MG: 50 INJECTION INTRAMUSCULAR; INTRAVENOUS at 08:10

## 2023-10-31 RX ADMIN — SODIUM CHLORIDE: 0.9 INJECTION, SOLUTION INTRAVENOUS at 07:10

## 2023-10-31 RX ADMIN — DEXMEDETOMIDINE HYDROCHLORIDE 8 MCG: 100 INJECTION, SOLUTION INTRAVENOUS at 08:10

## 2023-10-31 RX ADMIN — ROCURONIUM BROMIDE 30 MG: 10 INJECTION, SOLUTION INTRAVENOUS at 07:10

## 2023-10-31 RX ADMIN — MUPIROCIN 1 G: 20 OINTMENT TOPICAL at 09:10

## 2023-10-31 RX ADMIN — HYDROCODONE BITARTRATE AND ACETAMINOPHEN 1 TABLET: 5; 325 TABLET ORAL at 09:10

## 2023-10-31 RX ADMIN — CEFAZOLIN SODIUM 2 G: 2 SOLUTION INTRAVENOUS at 08:10

## 2023-10-31 RX ADMIN — ROCURONIUM BROMIDE 10 MG: 10 INJECTION, SOLUTION INTRAVENOUS at 07:10

## 2023-10-31 RX ADMIN — DEXAMETHASONE SODIUM PHOSPHATE 8 MG: 4 INJECTION, SOLUTION INTRAMUSCULAR; INTRAVENOUS at 08:10

## 2023-10-31 RX ADMIN — MIDAZOLAM HYDROCHLORIDE 2 MG: 1 INJECTION, SOLUTION INTRAMUSCULAR; INTRAVENOUS at 07:10

## 2023-10-31 RX ADMIN — ROCURONIUM BROMIDE 20 MG: 10 INJECTION, SOLUTION INTRAVENOUS at 08:10

## 2023-10-31 RX ADMIN — FENTANYL CITRATE 25 MCG: 50 INJECTION, SOLUTION INTRAMUSCULAR; INTRAVENOUS at 12:10

## 2023-10-31 RX ADMIN — HYDROCODONE BITARTRATE AND ACETAMINOPHEN 1 TABLET: 7.5; 325 TABLET ORAL at 03:10

## 2023-10-31 RX ADMIN — LIDOCAINE HYDROCHLORIDE 50 MG: 20 INJECTION, SOLUTION INTRAVENOUS at 07:10

## 2023-10-31 RX ADMIN — ROCURONIUM BROMIDE 20 MG: 10 INJECTION, SOLUTION INTRAVENOUS at 09:10

## 2023-10-31 RX ADMIN — GABAPENTIN 100 MG: 100 CAPSULE ORAL at 09:10

## 2023-10-31 RX ADMIN — ROCURONIUM BROMIDE 10 MG: 10 INJECTION, SOLUTION INTRAVENOUS at 08:10

## 2023-10-31 RX ADMIN — SUGAMMADEX 200 MG: 100 INJECTION, SOLUTION INTRAVENOUS at 10:10

## 2023-10-31 RX ADMIN — FENTANYL CITRATE 25 MCG: 50 INJECTION, SOLUTION INTRAMUSCULAR; INTRAVENOUS at 08:10

## 2023-10-31 NOTE — PLAN OF CARE
Nursing Transfer Note      10/31/2023   3:21 PM    Nurse giving handoff:Airam KOEHLER  Nurse receiving handoff:Parker KOEHLER    Reason patient is being transferred: Post-op    Transfer To: [id number]    Transfer via bed    Transfer with 2lpm nc to O2, cardiac monitoring    Transported by Airam KOEHLER      Patient belongings transferred with patient: No    Chart send with patient: Yes    Notified: spouse  Patient reassessed at: 10/31, 1505   Upon arrival to floor: cardiac monitor applied, patient oriented to room, call bell in reach, and bed in lowest position

## 2023-10-31 NOTE — CONSULTS
Pulmonary/Critical Care Consult      PATIENT NAME: Alexa Otero  MRN: [id number]  TODAY'S DATE: 10/31/2023  12:53 PM  ADMIT DATE: 10/31/2023  AGE: 83 y.o. : 1940    CONSULT REQUESTED BY: Washington Shankar MD    REASON FOR CONSULT:   Destroyed right lower lobe    HPI:  The patient is an 83-year-old female who has been coughing and producing purulent mucus for the last year.  She underwent 2 bronchoscopies which both came back with debris.  She did have a PET scan which had some hypermetabolism in the edges of the right lower lobe and an inferior hilar node that was PET warm as well.  The patient has a remote history of endometrioid adenocarcinoma.  Prior to this surgery, the patient has had to have coronary artery stents placed.  Today she was brought to the OR in a right lower lobectomy was done.    REVIEW OF SYSTEMS  GENERAL: Feeling sleepy  EYES: Vision is good.  ENT: No sinusitis or pharyngitis.   HEART: No chest pain or palpitations.  LUNGS:  Her chest is minimally sore at this time  GI: No Nausea, vomiting, constipation, diarrhea, or reflux.  : No dysuria, hesitancy, or nocturia.  SKIN: No lesions or rashes.  MUSCULOSKELETAL: No joint pain or myalgias.  NEURO: No headaches or neuropathy.  LYMPH: No edema or adenopathy.  PSYCH: No anxiety or depression.  ENDO: No weight change.    ALLERGIES  Review of patient's allergies indicates:  No Known Allergies    INPATIENT SCHEDULED MEDICATIONS   aspirin  81 mg Oral Daily    atorvastatin  40 mg Oral QHS    [START ON 2023] clopidogreL  75 mg Oral Daily    dexmedeTOMIDine        dexmedeTOMIDine  15 mcg Intravenous Once    fentaNYL  1 patch Transdermal Q72H    gabapentin  100 mg Oral BID    guaiFENesin  600 mg Oral BID    lactated ringers  1,000 mL Intravenous Once    [START ON 2023] metoprolol succinate  25 mg Oral Daily    mupirocin  1 g Nasal BID    pantoprazole  40 mg Oral Daily    scopolamine  1 patch Transdermal Once         MEDICAL AND SURGICAL  HISTORY  Past Medical History:   Diagnosis Date    Arthritis     Cardiac arrest 10/2023    Cataract     Colon polyp     Diabetes mellitus type II 2012    Encounter for blood transfusion     Extra-ovarian endometrioid adenocarcinoma 2020    GERD (gastroesophageal reflux disease)     History of chronic cough     clear productive    Hyperlipidemia     Hypertension     Macular degeneration     Ovarian cancer     PONV (postoperative nausea and vomiting)     Squamous cell carcinoma 1980's    precancer of cervix     Past Surgical History:   Procedure Laterality Date    AUGMENTATION OF BREAST      BRONCHOSCOPY WITH FLUOROSCOPY Right 05/11/2023    Procedure: BRONCHOSCOPY, WITH FLUOROSCOPY;  Surgeon: Neva Christian MD;  Location: [facility];  Service: Pulmonary;  Laterality: Right;    CATARACT EXTRACTION BILATERAL W/ ANTERIOR VITRECTOMY Bilateral     CHOLECYSTECTOMY      COLONOSCOPY      COLONOSCOPY N/A 04/20/2023    Procedure: COLONOSCOPY;  Surgeon: Sabino Stephenson MD;  Location: [facility];  Service: Endoscopy;  Laterality: N/A;    CORONARY STENT PLACEMENT  10/2023    x 3    ESOPHAGOGASTRODUODENOSCOPY N/A 06/20/2018    Procedure: EGD (ESOPHAGOGASTRODUODENOSCOPY);  Surgeon: Sabino Stephenson MD;  Location: [facility];  Service: Endoscopy;  Laterality: N/A;    ESOPHAGOGASTRODUODENOSCOPY N/A 03/31/2023    Procedure: EGD (ESOPHAGOGASTRODUODENOSCOPY);  Surgeon: Sabino Stephenson MD;  Location: [facility];  Service: Endoscopy;  Laterality: N/A;    HYSTERECTOMY  1976    partial    INJECTION OF ANESTHETIC AGENT AROUND MEDIAL BRANCH NERVES INNERVATING LUMBAR FACET JOINT Right 05/10/2023    Procedure: Block-nerve-Lateral-branch-lumbar;  Surgeon: Agustin Robles MD;  Location: [facility];  Service: Pain Management;  Laterality: Right;  L5 and s1,s2 LBB    INJECTION OF ANESTHETIC AGENT AROUND MEDIAL BRANCH NERVES INNERVATING LUMBAR FACET JOINT Right 05/30/2023    Procedure: Block-nerve-medial branch-lumbar;  Surgeon: Agustin Robles MD;   Location: [facility] OR;  Service: Pain Management;  Laterality: Right;  L5, s1 ,s2 LBB #2    INJECTION, SACROILIAC JOINT Right 2023    Procedure: INJECTION,SACROILIAC JOINT;  Surgeon: Agustin Robles MD;  Location: [facility] OR;  Service: Pain Management;  Laterality: Right;    INSERTION OF TUNNELED CENTRAL VENOUS CATHETER (CVC) WITH SUBCUTANEOUS PORT Right 10/19/2020    Procedure: INSERTION, PORT-A-CATH;  Surgeon: Misti Mcfarland MD;  Location: [facility] OR;  Service: General;  Laterality: Right;    INTRAMEDULLARY RODDING OF TROCHANTER OF FEMUR Right 2021    Procedure: INSERTION, INTRAMEDULLARY LUC, FEMUR, TROCHANTER/RIGHT TFN DR FAJARDO NOTIFIED REP;  Surgeon: Kp Fajardo MD;  Location: [facility] OR;  Service: Orthopedics;  Laterality: Right;  SKIP    ROBOT-ASSISTED LAPAROSCOPIC LYMPHADENECTOMY USING DA NELLA XI N/A 2020    Procedure: XI ROBOTIC LYMPHADENECTOMY-pelvic and kell-aortic;  Surgeon: Altagracia Ray MD;  Location: [facility] OR;  Service: OB/GYN;  Laterality: N/A;    ROBOT-ASSISTED LAPAROSCOPIC OMENTECTOMY USING DA NELLA XI N/A 2020    Procedure: XI ROBOTIC OMENTECTOMY;  Surgeon: Altagracia Ray MD;  Location: [facility] OR;  Service: OB/GYN;  Laterality: N/A;    ROBOT-ASSISTED LAPAROSCOPIC SALPINGO-OOPHORECTOMY USING DA NELLA XI Bilateral 2020    Procedure: XI ROBOTIC SALPINGO-OOPHORECTOMY;  Surgeon: Altagracia Ray MD;  Location: [facility] OR;  Service: OB/GYN;  Laterality: Bilateral;    UPPER GASTROINTESTINAL ENDOSCOPY         ALCOHOL, TOBACCO AND DRUG USE  Social History     Tobacco Use   Smoking Status Former    Current packs/day: 0.00    Average packs/day: 1 pack/day for 20.0 years (20.0 ttl pk-yrs)    Types: Cigarettes    Start date:     Quit date: 1981    Years since quittin.8   Smokeless Tobacco Never   Tobacco Comments    quit 40 yrs ago     Social History     Substance and Sexual Activity   Alcohol Use Yes    Alcohol/week: 1.0 standard drink of alcohol    Types: 1 Glasses  of wine per week    Comment: occasional     Social History     Substance and Sexual Activity   Drug Use No       FAMILY HISTORY  Family History   Problem Relation Age of Onset    Cancer Mother 57        lung    Cancer Father 56        oral    Skin cancer Sister     Skin cancer Brother     Melanoma Brother     Skin cancer Brother     Breast cancer Maternal Grandmother     Breast cancer Paternal Grandmother     Colon polyps Daughter     Psoriasis Neg Hx     Lupus Neg Hx     Eczema Neg Hx     Colon cancer Neg Hx     Crohn's disease Neg Hx     Esophageal cancer Neg Hx     Stomach cancer Neg Hx     Ulcerative colitis Neg Hx        VITAL SIGNS (MOST RECENT)  Temp: 97.4 °F (36.3 °C) (10/31/23 1230)  Pulse: 65 (10/31/23 1230)  Resp: 14 (10/31/23 1248)  BP: (!) 108/59 (10/31/23 1230)  SpO2: 100 % (10/31/23 1230)    INTAKE AND OUTPUT (LAST 24 HOURS):  Intake/Output Summary (Last 24 hours) at 10/31/2023 1253  Last data filed at 10/31/2023 1104  Gross per 24 hour   Intake 2700 ml   Output 570 ml   Net 2130 ml       WEIGHT  Wt Readings from Last 1 Encounters:   10/31/23 54.4 kg (120 lb)       PHYSICAL EXAM  GENERAL: Older patient in no distress.  HEENT: Pupils equal and reactive. Extraocular movements intact. Nose intact. Pharynx moist.  NECK: Supple.   HEART: Regular rate and rhythm. No murmur or gallop auscultated.  LUNGS:  There crackles anteriorly laterally and posteriorly on the right.  The left lung is clear.  Lung excursion symmetrical. No change in fremitus.   Thoracostomy tube:  Serosanguineous drainage.  ABDOMEN: Bowel sounds present. Non-tender, no masses palpated.  : Normal anatomy.  Rausch with yellow urine.  EXTREMITIES: Normal muscle tone and joint movement, no cyanosis or clubbing.   LYMPHATICS: No adenopathy palpated, no edema.  SKIN: Dry, intact, no lesions.   NEURO: Cranial nerves II-XII intact. Motor strength 5/5 bilaterally, upper and lower extremities.  PSYCH: Appropriate affect      CBC LAST (LAST 24  HOURS)  Recent Labs   Lab 10/31/23  0947   HCT 26*       CHEMISTRY LAST (LAST 24 HOURS)  Recent Labs   Lab 10/31/23  0600 10/31/23  0837 10/31/23  0947   K 3.8  --   --    PH  --    < > 7.335*   MG 1.5*  --   --     < > = values in this interval not displayed.       LAST 7 DAYS MICROBIOLOGY   Microbiology Results (last 7 days)       Procedure Component Value Units Date/Time    Tissue culture [[id number]] Collected: 10/31/23 1127    Order Status: No result Specimen: Tissue Updated: 10/31/23 1227    Fungus culture [[id number]] Collected: 10/31/23 1127    Order Status: Sent Specimen: Biopsy from Lung, Right Updated: 10/31/23 1216    AFB Culture & Smear [[id number]] Collected: 10/31/23 1127    Order Status: Sent Specimen: Biopsy from Lung, Right Updated: 10/31/23 1216    Culture, Anaerobe [[id number]] Collected: 10/31/23 1127    Order Status: Sent Specimen: Biopsy from Lung, Right Updated: 10/31/23 1215    Aerobic culture [[id number]] Collected: 10/31/23 1127    Order Status: Canceled Specimen: Tissue from Lung, Right     Gram stain [[id number]] Collected: 10/31/23 1127    Order Status: Canceled Specimen: Biopsy from Lung, Right             MOST RECENT IMAGING  X-Ray Chest AP Single View  Portable chest x-ray at 11:34 AM is compared to prior study dated 10/27/2023    Clinical history is right lower lobectomy    The right IJ Port-A-Cath is in stable position. There are 2 right sided chest tubes. There is no there is faint airspace disease in the right upper lobe. The left lung is clear. Right upper lobe and left lung are clear.  The cardiomediastinal silhouette is normal in size. There are rim calcified breast implants. There are no acute osseous abnormalities. There are surgical clips in the right upper quadrant.    IMPRESSION:  Status post right lower lobectomy with 2 chest tubes in place and airspace disease in the right lower lung. There is no pneumothorax or large pleural effusion    Stable faint groundglass opacity  in the right upper lobe.  Electronically signed by:  Chichi Gann MD  10/31/2023 11:57 AM CDT Workstation: [id number]      CURRENT VISIT EKG  No results found for this visit on 10/31/23.    ECHOCARDIOGRAM RESULTS  Results for orders placed during the hospital encounter of 10/13/23    Echo    Interpretation Summary    Left Ventricle: The left ventricle is normal in size. Normal wall thickness. Normal wall motion. There is normal systolic function with a visually estimated ejection fraction of 55 - 60%. Grade I diastolic dysfunction.    Right Ventricle: Normal right ventricular cavity size. Wall thickness is normal. Right ventricle wall motion  is normal. Systolic function is normal.    Aortic Valve: There is moderate aortic valve sclerosis.    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 There is mild mitral annular calcification present. There is no stenosis. The mean pressure gradient across the mitral valve is 3 mmHg at a heart rate of  bpm. There is mild regurgitation.    Tricuspid Valve: There is mild regurgitation.  No pulmonary hypertension.    IVC/SVC: Normal venous pressure at 3 mmHg.        Oxygen INFORMATION     2 L         LAST ARTERIAL BLOOD GAS  ABG  Recent Labs   Lab 10/31/23  0947   PH 7.335*   PO2 152*   PCO2 45.9*   HCO3 24.5   BE -1       IMPRESSION AND PLAN  Destroyed right lower lobe presumably from chronic aspiration  Remote history of endometrioid adenocarcinoma  - cultures and pathology sent  Status post right lower lobectomy  - currently in no pain  Coronary artery disease with recent stent  - will need to resume antiplatelet therapy soon  Hypomagnesemia  - replace and trend    Neva Christian MD  Date of Service: 10/31/2023  12:53 PM

## 2023-10-31 NOTE — OP NOTE
Date of Operation: October 31, 2023      Pre-Operative Diagnosis:  Right Lung Lower Lobe Mass      Post-Operative Diagnosis: Right Lung Lower Lobe Mass      Operation: 1. Right Lung Lower Lobectomy                    2. Right Video Assisted Thoracoscopy                    3. Intercostal Nerve Block x 6 Levels      Surgeon: Washington Shankar MD      Assistant: MEKHI Gill      Anesthesia: General      Estimated Blood Loss: 400 cc        Indication for Procedure:           The patient is an 83-year-old female former smoker who was referred to the Thoracic Surgery Service by her Pulmonologist, Dr. Christian, for evaluation and management of a chronic right lung lower lobe mass and consolidation, which has elements of hypermetabolic activity on PET scan.   After undergoing outpatient evaluation, the patient decided to proceed with operative therapy. She is recently status post coronary artery interventions in preparation for surgery.  She [facility] to [facility] today for the planned procedure.        Procedure in Detail:          The patient was transferred to the Operating Theater and placed on the operating table in the supine position.  EKG, pulse oximetry monitoring line and a noninvasive blood pressure cuff were applied.  A left radial arterial line was inserted using sterile technique by the Anesthesia Service.  The patient underwent induction, intubation with a double-lumen endotracheal tube and administration of general anesthesia.  Appropriate positioning of the double-lumen endotracheal tube was confirmed by the Anesthesia Service.  Fiberoptic bronchoscopy revealed no obstructive lesions in the right bronchial network.  The patient was placed in a left decubitus position, on a beanbag with appropriate cushioning under all pressure points.  The patient's right chest was scrubbed with Hibiclens scrub solution, followed by Betadine, followed by alcohol and skin .  The patient's right  "chest was draped in a sterile fashion with an Ioban drape.  A " time-out was " performed, confirming the patient's identity and planned procedure.         The patient's right lung was selectively deflated by the Anesthesia Service.  At the seventh intercostal space along the posterior axillary line, a small incision was made and blunt dissection was continued into the pleural cavity.  A thoracoscope was introduced yielding images of the pleural space.  The lower lobe was severely contracted, consolidated and very firm to indirect palpation.  The upper and middle lobes appeared relatively healthy in comparison.  Based on the thoracoscopic findings, the decision was made to proceed directly to right lower lobectomy.  A five inch limited lateral incision was made along the fifth intercostal space.  The serratus anterior muscles were split along the fiber lengths.  The latissimus dorsi muscle was spared.  The intercostal muscles were taken down anteriorly and posteriorly.  A rib  was inserted.  A six level intercostal nerve block using 0.5% Marcaine solution was performed, applied to levels T3, T4, T5, T6, T7 and T8.  The right lower lobe was very firm to palpation.  The major fissure was essentially complete.  Exposure to the pulmonary artery was obtained dissecting into the Leriche's plane.  The inferior pulmonary ligament was divided with electrocautery.  The inferior pulmonary vein was dissected free overlying pleural reflection.  Using a thorascopic MILTON stapler, the inferior pulmonary vein was stapled and divided.  In a similar fashion, exposure to the lower lobe pulmonary artery was achieved, and again a thorascopic MILTON stapler was used to divide the pulmonary artery at this level.  Several lymph nodes were harvested from adjacent to the lower lobe bronchus.  The lower lobe bronchus was clamped with a thorascopic MILTON stapler.  Positive pressure ventilation was applied confirming adequate ventilation of the " middle and upper lobes.  The stapler was fired and the lower lobe bronchus divided.  The lower lobe was removed.  On the back table a portion of the specimen was excised for tissue culture.  The remainder of the lobe was forwarded for routine histopathologic evaluation.  Additional lymph nodes were harvested from the station 10 location and forwarded for routine pathologic review.  Hemostasis was satisfactory.  A liter of warm antibiotic based irrigation was placed in the chest cavity while the lung was ventilated.  There was no evidence of air leak.  This irrigant was suctioned.  A pair of size 32 Upper sorbian chest tubes were inserted through inferior stab incisions, one directed along the lateral sidewall of the chest and the other along the posterior gutter the chest.  Both were secured to skin with number #2 silk suture and later connected to a Pleur-evac drainage container.  Five holes were drilled through the sixth rib.  Through these holes, #1 Vicryl sutures were placed and advanced along the top of the fifth rib.  The ribs were brought back to anatomical location.  The serratus anterior muscle was closed with running 0 Vicryl suture.  The subcuticular layer was closed with 2.0 Vicryl suture and the skin was closed with skin staples.  Sterile dressings were placed over all operative sites.  The patient was placed in the supine position and allowed to awaken from anesthesia.  She was extubated in the Operating Theater and then transferred to the Post Anesthesia Care Unit in stable condition for further care.

## 2023-10-31 NOTE — ANESTHESIA POSTPROCEDURE EVALUATION
Anesthesia Post Evaluation    Patient: Alexa Otero    Procedure(s) Performed: Procedure(s) (LRB):  VATS, WITH PLEURA BIOPSY (Right)  THORACOTOMY (Right)  INTERCOSTAL NERVE BLOCK (Right)    Final Anesthesia Type: general      Patient location during evaluation: PACU  Patient participation: Yes- Able to Participate  Level of consciousness: awake and alert, oriented and awake  Post-procedure vital signs: reviewed and stable  Pain management: adequate  Airway patency: patent    PONV status at discharge: No PONV  Anesthetic complications: no      Cardiovascular status: blood pressure returned to baseline, hemodynamically stable and stable  Respiratory status: nasal cannula  Hydration status: euvolemic  Follow-up not needed.          Vitals Value Taken Time   /55 10/31/23 1300   Temp 36.3 °C (97.4 °F) 10/31/23 1230   Pulse 69 10/31/23 1303   Resp 16 10/31/23 1303   SpO2 99 % 10/31/23 1303   Vitals shown include unvalidated device data.      No case tracking events are documented in the log.      Pain/Jess Score: Pain Rating Prior to Med Admin: 8 (10/31/2023 12:48 PM)  Jess Score: 9 (10/31/2023 12:30 PM)        
yes

## 2023-10-31 NOTE — ANESTHESIA PROCEDURE NOTES
Intubation    Date/Time: 10/31/2023 7:32 AM    Performed by: Renate Guerrero CRNA  Authorized by: Goyo Newman MD    Intubation:     Induction:  Intravenous    Intubated:  Postinduction    Mask Ventilation:  Easy mask    Attempts:  1    Attempted By:  CRNA    Method of Intubation:  Video laryngoscopy and direct    Blade:  Rodríguez 3    Laryngeal View Grade: Grade I - full view of cords      Difficult Airway Encountered?: No      Complications:  None    Airway Device:  Oral endotracheal tube    Airway Device Size:  Other (see comments)    Style/Cuff Inflation:  Cuffed (inflated to minimal occlusive pressure)    Tube secured:  31    Secured at:  The teeth    Placement Verified By:  Capnometry    Complicating Factors:  None    Findings Post-Intubation:  BS equal bilateral and atraumatic/condition of teeth unchanged  Notes:      Atraumatic oral intubation with 39 Left double lumen ETT. Positioned verified per fiberoptic scop per Dr. Newman.

## 2023-10-31 NOTE — TRANSFER OF CARE
"Anesthesia Transfer of Care Note    Patient: Alexa Otero    Procedure(s) Performed: Procedure(s) (LRB):  VATS, WITH PLEURA BIOPSY (Right)  THORACOTOMY (Right)  INTERCOSTAL NERVE BLOCK (Right)    Patient location: PACU    Anesthesia Type: general    Transport from OR: Transported from OR on room air with adequate spontaneous ventilation. Transported from OR on 6-10 L/min O2 by face mask with adequate spontaneous ventilation    Post pain: adequate analgesia    Post assessment: no apparent anesthetic complications    Post vital signs: stable    Level of consciousness: awake and alert    Nausea/Vomiting: no nausea/vomiting    Complications: none    Transfer of care protocol was followed      Last vitals:   Visit Vitals  BP (!) 151/77 (BP Location: Right arm, Patient Position: Lying)   Pulse 81   Temp 36.6 °C (97.9 °F) (Oral)   Resp 17   Ht 5' 3" (1.6 m)   Wt 54.4 kg (120 lb)   SpO2 95%   Breastfeeding No   BMI 21.26 kg/m²     "

## 2023-10-31 NOTE — ANESTHESIA PROCEDURE NOTES
Arterial    Diagnosis: Right Lung Mass    Patient location during procedure: done in OR  Procedure start time: 10/31/2023 7:31 AM  Timeout: 10/31/2023 7:31 AM  Procedure end time: 10/31/2023 7:35 AM    Staffing  Authorizing Provider: Goyo Newman MD  Performing Provider: Goyo Newman MD    Staffing  Performed by: Goyo Newman MD  Authorized by: Goyo Newman MD    Anesthesiologist was present at the time of the procedure.    Preanesthetic Checklist  Completed: patient identified, IV checked, site marked, risks and benefits discussed, surgical consent, monitors and equipment checked, pre-op evaluation, timeout performed and anesthesia consent givenArterial  Skin Prep: chlorhexidine gluconate and isopropyl alcohol  Local Infiltration: none  Orientation: left  Location: radial    Catheter Size: 20 G  Catheter placement by Ultrasound guidance. Heme positive aspiration all ports.   Vessel Caliber: small, patent, compressibility normal  Needle advanced into vessel with real time Ultrasound guidance.  Sterile sheath used.Insertion Attempts: 1  Assessment  Dressing: secured with tape and tegaderm, secured with tape, tegaderm, sutured in place and taped and steri-strips  Patient: Tolerated well

## 2023-11-01 LAB
BASOPHILS # BLD AUTO: 0.02 K/UL (ref 0–0.2)
BASOPHILS NFR BLD: 0.2 % (ref 0–1.9)
DIFFERENTIAL METHOD BLD: ABNORMAL
EOSINOPHIL # BLD AUTO: 0 K/UL (ref 0–0.5)
EOSINOPHIL NFR BLD: 0.1 % (ref 0–8)
ERYTHROCYTE [DISTWIDTH] IN BLOOD BY AUTOMATED COUNT: 15.3 % (ref 11.5–14.5)
HCT VFR BLD AUTO: 30.2 % (ref 37–48.5)
HGB BLD-MCNC: 9.9 G/DL (ref 12–16)
IMM GRANULOCYTES # BLD AUTO: 0.04 K/UL (ref 0–0.04)
IMM GRANULOCYTES NFR BLD AUTO: 0.4 % (ref 0–0.5)
LYMPHOCYTES # BLD AUTO: 0.6 K/UL (ref 1–4.8)
LYMPHOCYTES NFR BLD: 5.8 % (ref 18–48)
MAGNESIUM SERPL-MCNC: 1.5 MG/DL (ref 1.6–2.6)
MCH RBC QN AUTO: 29.1 PG (ref 27–31)
MCHC RBC AUTO-ENTMCNC: 32.8 G/DL (ref 32–36)
MCV RBC AUTO: 89 FL (ref 82–98)
MONOCYTES # BLD AUTO: 0.9 K/UL (ref 0.3–1)
MONOCYTES NFR BLD: 8.6 % (ref 4–15)
NEUTROPHILS # BLD AUTO: 9.1 K/UL (ref 1.8–7.7)
NEUTROPHILS NFR BLD: 84.9 % (ref 38–73)
NRBC BLD-RTO: 0 /100 WBC
PLATELET # BLD AUTO: 215 K/UL (ref 150–450)
PMV BLD AUTO: 9.2 FL (ref 9.2–12.9)
RBC # BLD AUTO: 3.4 M/UL (ref 4–5.4)
WBC # BLD AUTO: 10.69 K/UL (ref 3.9–12.7)

## 2023-11-01 PROCEDURE — 99900031 HC PATIENT EDUCATION (STAT)

## 2023-11-01 PROCEDURE — 21000000 HC CCU ICU ROOM CHARGE

## 2023-11-01 PROCEDURE — 20000000 HC ICU ROOM

## 2023-11-01 PROCEDURE — 94761 N-INVAS EAR/PLS OXIMETRY MLT: CPT

## 2023-11-01 PROCEDURE — 63600175 PHARM REV CODE 636 W HCPCS: Performed by: INTERNAL MEDICINE

## 2023-11-01 PROCEDURE — 99900035 HC TECH TIME PER 15 MIN (STAT)

## 2023-11-01 PROCEDURE — 94799 UNLISTED PULMONARY SVC/PX: CPT

## 2023-11-01 PROCEDURE — 99233 SBSQ HOSP IP/OBS HIGH 50: CPT | Mod: ,,, | Performed by: INTERNAL MEDICINE

## 2023-11-01 PROCEDURE — 25000003 PHARM REV CODE 250: Performed by: THORACIC SURGERY (CARDIOTHORACIC VASCULAR SURGERY)

## 2023-11-01 PROCEDURE — 63600175 PHARM REV CODE 636 W HCPCS: Performed by: THORACIC SURGERY (CARDIOTHORACIC VASCULAR SURGERY)

## 2023-11-01 PROCEDURE — 85025 COMPLETE CBC W/AUTO DIFF WBC: CPT | Performed by: THORACIC SURGERY (CARDIOTHORACIC VASCULAR SURGERY)

## 2023-11-01 PROCEDURE — 25000003 PHARM REV CODE 250: Performed by: INTERNAL MEDICINE

## 2023-11-01 PROCEDURE — 83735 ASSAY OF MAGNESIUM: CPT | Performed by: THORACIC SURGERY (CARDIOTHORACIC VASCULAR SURGERY)

## 2023-11-01 RX ORDER — POTASSIUM CHLORIDE 7.45 MG/ML
40 INJECTION INTRAVENOUS
Status: DISCONTINUED | OUTPATIENT
Start: 2023-11-01 | End: 2023-11-02

## 2023-11-01 RX ORDER — LANOLIN ALCOHOL/MO/W.PET/CERES
400 CREAM (GRAM) TOPICAL DAILY
Status: DISCONTINUED | OUTPATIENT
Start: 2023-11-01 | End: 2023-11-01

## 2023-11-01 RX ORDER — CLOPIDOGREL BISULFATE 75 MG/1
75 TABLET ORAL 2 TIMES DAILY
Status: DISCONTINUED | OUTPATIENT
Start: 2023-11-01 | End: 2023-11-05 | Stop reason: HOSPADM

## 2023-11-01 RX ORDER — POTASSIUM CHLORIDE 7.45 MG/ML
60 INJECTION INTRAVENOUS
Status: DISCONTINUED | OUTPATIENT
Start: 2023-11-01 | End: 2023-11-02

## 2023-11-01 RX ORDER — MAGNESIUM SULFATE HEPTAHYDRATE 40 MG/ML
4 INJECTION, SOLUTION INTRAVENOUS
Status: DISCONTINUED | OUTPATIENT
Start: 2023-11-01 | End: 2023-11-02

## 2023-11-01 RX ORDER — POTASSIUM CHLORIDE 7.45 MG/ML
80 INJECTION INTRAVENOUS
Status: DISCONTINUED | OUTPATIENT
Start: 2023-11-01 | End: 2023-11-02

## 2023-11-01 RX ORDER — CALCIUM GLUCONATE 20 MG/ML
1 INJECTION, SOLUTION INTRAVENOUS
Status: DISCONTINUED | OUTPATIENT
Start: 2023-11-01 | End: 2023-11-02

## 2023-11-01 RX ORDER — CEFAZOLIN SODIUM 1 G/50ML
1 SOLUTION INTRAVENOUS
Status: COMPLETED | OUTPATIENT
Start: 2023-11-01 | End: 2023-11-02

## 2023-11-01 RX ORDER — LANOLIN ALCOHOL/MO/W.PET/CERES
400 CREAM (GRAM) TOPICAL 2 TIMES DAILY
Status: DISCONTINUED | OUTPATIENT
Start: 2023-11-01 | End: 2023-11-02

## 2023-11-01 RX ORDER — CALCIUM GLUCONATE 20 MG/ML
3 INJECTION, SOLUTION INTRAVENOUS
Status: DISCONTINUED | OUTPATIENT
Start: 2023-11-01 | End: 2023-11-02

## 2023-11-01 RX ORDER — HYDROMORPHONE HYDROCHLORIDE 1 MG/ML
0.5 INJECTION, SOLUTION INTRAMUSCULAR; INTRAVENOUS; SUBCUTANEOUS
Status: DISCONTINUED | OUTPATIENT
Start: 2023-11-01 | End: 2023-11-04

## 2023-11-01 RX ORDER — HYDROCODONE BITARTRATE AND ACETAMINOPHEN 5; 325 MG/1; MG/1
1 TABLET ORAL ONCE
Status: COMPLETED | OUTPATIENT
Start: 2023-11-01 | End: 2023-11-01

## 2023-11-01 RX ORDER — SODIUM,POTASSIUM PHOSPHATES 280-250MG
1 POWDER IN PACKET (EA) ORAL ONCE
Status: COMPLETED | OUTPATIENT
Start: 2023-11-01 | End: 2023-11-01

## 2023-11-01 RX ORDER — CALCIUM GLUCONATE 20 MG/ML
2 INJECTION, SOLUTION INTRAVENOUS
Status: DISCONTINUED | OUTPATIENT
Start: 2023-11-01 | End: 2023-11-02

## 2023-11-01 RX ORDER — MAGNESIUM SULFATE HEPTAHYDRATE 40 MG/ML
2 INJECTION, SOLUTION INTRAVENOUS
Status: DISCONTINUED | OUTPATIENT
Start: 2023-11-01 | End: 2023-11-02

## 2023-11-01 RX ADMIN — MAGNESIUM SULFATE 2 G: 2 INJECTION INTRAVENOUS at 01:11

## 2023-11-01 RX ADMIN — ASPIRIN 81 MG: 81 TABLET, COATED ORAL at 09:11

## 2023-11-01 RX ADMIN — MUPIROCIN 1 G: 20 OINTMENT TOPICAL at 08:11

## 2023-11-01 RX ADMIN — ATORVASTATIN CALCIUM 40 MG: 40 TABLET, FILM COATED ORAL at 08:11

## 2023-11-01 RX ADMIN — METOPROLOL SUCCINATE 25 MG: 25 TABLET, EXTENDED RELEASE ORAL at 09:11

## 2023-11-01 RX ADMIN — GUAIFENESIN 600 MG: 600 TABLET, EXTENDED RELEASE ORAL at 09:11

## 2023-11-01 RX ADMIN — CLOPIDOGREL BISULFATE 75 MG: 75 TABLET, FILM COATED ORAL at 09:11

## 2023-11-01 RX ADMIN — GABAPENTIN 100 MG: 100 CAPSULE ORAL at 08:11

## 2023-11-01 RX ADMIN — HYDROCODONE BITARTRATE AND ACETAMINOPHEN 1 TABLET: 7.5; 325 TABLET ORAL at 01:11

## 2023-11-01 RX ADMIN — HYDROCODONE BITARTRATE AND ACETAMINOPHEN 1 TABLET: 7.5; 325 TABLET ORAL at 08:11

## 2023-11-01 RX ADMIN — MUPIROCIN 1 G: 20 OINTMENT TOPICAL at 09:11

## 2023-11-01 RX ADMIN — CLOPIDOGREL BISULFATE 75 MG: 75 TABLET, FILM COATED ORAL at 08:11

## 2023-11-01 RX ADMIN — HYDROMORPHONE HYDROCHLORIDE 0.5 MG: 0.5 INJECTION, SOLUTION INTRAMUSCULAR; INTRAVENOUS; SUBCUTANEOUS at 06:11

## 2023-11-01 RX ADMIN — Medication 400 MG: at 09:11

## 2023-11-01 RX ADMIN — HYDROCODONE BITARTRATE AND ACETAMINOPHEN 1 TABLET: 5; 325 TABLET ORAL at 02:11

## 2023-11-01 RX ADMIN — POTASSIUM, SODIUM PHOSPHATES 280 MG-160 MG-250 MG ORAL POWDER PACKET 1 PACKET: POWDER IN PACKET at 11:11

## 2023-11-01 RX ADMIN — ONDANSETRON 4 MG: 4 TABLET, ORALLY DISINTEGRATING ORAL at 01:11

## 2023-11-01 RX ADMIN — CEFAZOLIN SODIUM 1 G: 1 SOLUTION INTRAVENOUS at 05:11

## 2023-11-01 RX ADMIN — Medication 400 MG: at 08:11

## 2023-11-01 RX ADMIN — HYDROCODONE BITARTRATE AND ACETAMINOPHEN 1 TABLET: 5; 325 TABLET ORAL at 12:11

## 2023-11-01 RX ADMIN — HYDROCODONE BITARTRATE AND ACETAMINOPHEN 1 TABLET: 7.5; 325 TABLET ORAL at 05:11

## 2023-11-01 RX ADMIN — GUAIFENESIN 600 MG: 600 TABLET, EXTENDED RELEASE ORAL at 08:11

## 2023-11-01 RX ADMIN — PANTOPRAZOLE SODIUM 40 MG: 40 TABLET, DELAYED RELEASE ORAL at 05:11

## 2023-11-01 RX ADMIN — GABAPENTIN 100 MG: 100 CAPSULE ORAL at 09:11

## 2023-11-01 NOTE — PLAN OF CARE
10/31/23 2108   Patient Assessment/Suction   Level of Consciousness (AVPU) alert   Respiratory Effort Unlabored   Expansion/Accessory Muscles/Retractions expansion symmetric   All Lung Fields Breath Sounds coarse   Cough Frequency infrequent   Cough Type good;nonproductive   PRE-TX-O2   Device (Oxygen Therapy) nasal cannula   $ Is the patient on Low Flow Oxygen? Yes   Flow (L/min) 2   SpO2 100 %   Pulse Oximetry Type Continuous   Pulse 81   Resp 17   Education   $ Education DME Oxygen;15 min

## 2023-11-01 NOTE — PROGRESS NOTES
Pulmonary/Critical Care Progress Note      PATIENT NAME: Alexa Otero  MRN: [id number]  TODAY'S DATE: 2023  12:53 PM  ADMIT DATE: 10/31/2023  AGE: 83 y.o. : 1940      HPI:  The patient is an 83-year-old female who has been coughing and producing purulent mucus for the last year.  She underwent 2 bronchoscopies which both came back with debris.  She did have a PET scan which had some hypermetabolism in the edges of the right lower lobe and an inferior hilar node that was PET warm as well.  The patient has a remote history of endometrioid adenocarcinoma.  Prior to this surgery, the patient has had to have coronary artery stents placed.  Today she was brought to the OR in a right lower lobectomy was done.     the patient had a fairly uneventful night with the exception of pain control.    REVIEW OF SYSTEMS  GENERAL: Feeling sleepy  EYES: Vision is good.  ENT: No sinusitis or pharyngitis.   HEART: No chest pain or palpitations.  LUNGS:  Her chest is minimally sore at this time  GI: No Nausea, vomiting, constipation, diarrhea, or reflux.  : No dysuria, hesitancy, or nocturia.  SKIN: No lesions or rashes.  MUSCULOSKELETAL: No joint pain or myalgias.  NEURO: No headaches or neuropathy.  LYMPH: No edema or adenopathy.  PSYCH: No anxiety or depression.  ENDO: No weight change.    No change in the patient's Past Medical History, Past Surgical History, Social History or Family History since admission.      VITAL SIGNS (MOST RECENT)  Temp: 97.9 °F (36.6 °C) (23 0300)  Pulse: 84 (23 0800)  Resp: 11 (23 0800)  BP: (!) 108/56 (23 0800)  SpO2: 100 % (23 08)    INTAKE AND OUTPUT (LAST 24 HOURS):  Intake/Output Summary (Last 24 hours) at 2023 0816  Last data filed at 2023 0743  Gross per 24 hour   Intake 3435 ml   Output 2230 ml   Net 1205 ml       WEIGHT  Wt Readings from Last 1 Encounters:   23 57.6 kg (126 lb 15.8 oz)       PHYSICAL EXAM  GENERAL: Older patient in  no distress.  She is sleeping this morning.  She was up all night.  HEENT: Pupils equal and reactive. Extraocular movements intact. Nose intact. Pharynx moist.  NECK: Supple.   HEART: Regular rate and rhythm. No murmur or gallop auscultated.  LUNGS:  There crackles anteriorly laterally and posteriorly on the right.  The left lung is clear.  Lung excursion symmetrical.   Thoracostomy tube:  Serosanguineous drainage.  No leak seen.  ABDOMEN: Bowel sounds present. Non-tender, no masses palpated.  : Normal anatomy.  Rausch with yellow urine.  EXTREMITIES: Normal muscle tone and joint movement, no cyanosis or clubbing.   LYMPHATICS: No adenopathy palpated, no edema.  SKIN: Dry, intact, no lesions.   NEURO: Cranial nerves II-XII intact. Motor strength 5/5 bilaterally, upper and lower extremities.  Per the nurse  PSYCH: Appropriate affect, sleeping at present      CBC LAST (LAST 24 HOURS)  Recent Labs   Lab 11/01/23 0314   WBC 10.69   RBC 3.40*   HGB 9.9*   HCT 30.2*   MCV 89   MCH 29.1   MCHC 32.8   RDW 15.3*      MPV 9.2   GRAN 84.9*  9.1*   LYMPH 5.8*  0.6*   MONO 8.6  0.9   BASO 0.02   NRBC 0       CHEMISTRY LAST (LAST 24 HOURS)  Recent Labs   Lab 10/31/23  0947 10/31/23  1705 11/01/23 0314   NA  --  137  --    K  --  3.9  --    CL  --  106  --    CO2  --  24  --    ANIONGAP  --  7*  --    BUN  --  22  --    CREATININE  --  1.0  --    GLU  --  183*  --    CALCIUM  --  8.6*  --    PH 7.335*  --   --    MG  --   --  1.5*       LAST 7 DAYS MICROBIOLOGY   Microbiology Results (last 7 days)       Procedure Component Value Units Date/Time    Tissue culture [[id number]] Collected: 10/31/23 1127    Order Status: Completed Specimen: Tissue Updated: 10/31/23 1444     Gram Stain Result Rare WBC's      No organisms seen    Narrative:      Right lower lung tissue culture    Fungus culture [[id number]] Collected: 10/31/23 1127    Order Status: Sent Specimen: Biopsy from Lung, Right Updated: 10/31/23 1216    AFB Culture  & Smear [[id number]] Collected: 10/31/23 1127    Order Status: Sent Specimen: Biopsy from Lung, Right Updated: 10/31/23 1216    Culture, Anaerobe [[id number]] Collected: 10/31/23 1127    Order Status: Sent Specimen: Biopsy from Lung, Right Updated: 10/31/23 1215    Aerobic culture [[id number]] Collected: 10/31/23 1127    Order Status: Canceled Specimen: Tissue from Lung, Right     Gram stain [[id number]] Collected: 10/31/23 1127    Order Status: Canceled Specimen: Biopsy from Lung, Right             MOST RECENT IMAGING  X-Ray Chest AP Portable  Reason: Status post Right Lung Lower lobectomy    FINDINGS:    Portable chest with comparison chest x-ray October 31, 2023 show normal cardiomediastinal silhouette. Right subclavian Port-A-Cath again noted. Right chest tube is unchanged.  Right lung low lung volumes. Bilateral mixed interstitial and airspace opacities of the right lung are unchanged. Left lung is clear. Small residual right pneumothorax noted, decreased in size from prior exam. Pulmonary vasculature is normal. No acute osseous abnormality.    IMPRESSION:    No significant change from prior exam.    Electronically signed by:  Kp Leiva DO  11/01/2023 07:12 AM CDT Workstation: [id number]      CURRENT VISIT EKG  No results found for this visit on 10/31/23.    ECHOCARDIOGRAM RESULTS  Results for orders placed during the hospital encounter of 10/13/23    Echo    Interpretation Summary    Left Ventricle: The left ventricle is normal in size. Normal wall thickness. Normal wall motion. There is normal systolic function with a visually estimated ejection fraction of 55 - 60%. Grade I diastolic dysfunction.    Right Ventricle: Normal right ventricular cavity size. Wall thickness is normal. Right ventricle wall motion  is normal. Systolic function is normal.    Aortic Valve: There is moderate aortic valve sclerosis.    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 There is mild mitral annular  calcification present. There is no stenosis. The mean pressure gradient across the mitral valve is 3 mmHg at a heart rate of  bpm. There is mild regurgitation.    Tricuspid Valve: There is mild regurgitation.  No pulmonary hypertension.    IVC/SVC: Normal venous pressure at 3 mmHg.        Oxygen INFORMATION     2 L         LAST ARTERIAL BLOOD GAS  ABG  Recent Labs   Lab 10/31/23  0947   PH 7.335*   PO2 152*   PCO2 45.9*   HCO3 24.5   BE -1       IMPRESSION AND PLAN  Destroyed right lower lobe presumably from chronic aspiration  Remote history of endometrioid adenocarcinoma  - cultures and pathology sent  Status post right lower lobectomy  - pain control was problematic last night  - small apical pneumothorax, improving  Coronary artery disease with recent stent  - will resume antiplatelet therapy today  Hypomagnesemia  - replace and trend  - electrolyte sliding scale placed  Anemia  - worse today  - postop    Will let the patient sleep this morning as she was up all night.  Hopefully will have pathology back today    Neva Christian MD  Date of Service: 11/01/2023  12:53 PM

## 2023-11-01 NOTE — PLAN OF CARE
[facility]  Initial Discharge Assessment       Primary Care Provider: Idalia Greco MD    Admission Diagnosis: Lung mass [R91.8]  Status post lobectomy of lung [Z90.2]    Admission Date: 10/31/2023  Expected Discharge Date:     Pt is an 83-year-old female who arrived from home with No active principal problem. Pt lives with her spouse, Porter Otero [phone]. Pt is oriented to person, place, and time. Pt is capable of performing ADLs without assistance. Pt drives to and from medical appointment. The Pt could not recall last visit with PCP. Pt uses a shower chair and BSC. Pt denies Coumadin, Dialysis, HH; Pt has been readmitted into the hospital in the past thirty day. Pt takes Clopidogrel and all other medication as prescribed; [facility] is pharmacy. Pt , Porter Otero, [phone] will assist patient at discharge. CM will continue to monitor.    Transition of Care Barriers: None    Payor: [employer] MANAGED MEDICARE / Plan: [employer] MEDICARE HMO / Product Type: Capitation /     Extended Emergency Contact Information  Primary Emergency Contact: TRISTON LE  Mobile Phone: [phone]  Relation: Daughter  Secondary Emergency Contact: Porter Otero  Address: [address] [place], [place] [id number] [place] [address]  Home Phone: [phone]  Mobile Phone: [phone]  Relation: Spouse  Preferred language: English   needed? No    Discharge Plan A: Home with family  Discharge Plan B: Home      [id number] [place] - [address] & [facility]  [address]  [place] [address]  Phone: [phone] Fax: [phone]      Initial Assessment (most recent)       Adult Discharge Assessment - 11/01/23 1239          Discharge Assessment    Assessment Type Discharge Planning Assessment     Confirmed/corrected address, phone number and insurance Yes     Confirmed Demographics Correct on Facesheet     Source of Information patient     When  was your last doctors appointment? --   Pt could not recall    Communicated ALYCIA with patient/caregiver Date not available/Unable to determine     Reason For Admission No active principal problem     People in Home spouse     Facility Arrived From: home     Do you expect to return to your current living situation? Yes     Do you have help at home or someone to help you manage your care at home? Yes     Who are your caregiver(s) and their phone number(s)? Porter Otero/spouse [phone]     Prior to hospitilization cognitive status: Alert/Oriented     Current cognitive status: Alert/Oriented     Dressing/Bathing bathing difficulty, requires equipment     Home Accessibility not wheelchair accessible     Equipment Currently Used at Home bedside commode;shower chair     Readmission within 30 days? Yes     Patient currently being followed by outpatient case management? No     Do you currently have service(s) that help you manage your care at home? No     Do you take prescription medications? Yes     Do you have prescription coverage? Yes     Coverage Payor:  [employer] MANAGED MEDICARE - [employer] MEDICARE HMO     Do you have any problems affording any of your prescribed medications? No     Is the patient taking medications as prescribed? yes     Who is going to help you get home at discharge? Porter Otero/spouse [phone]     How do you get to doctors appointments? family or friend will provide     Are you on dialysis? No     Do you take coumadin? No     DME Needed Upon Discharge  none     Discharge Plan discussed with: Patient     Transition of Care Barriers None     Discharge Plan A Home with family     Discharge Plan B Home

## 2023-11-01 NOTE — PROGRESS NOTES
Date: November 1, 2023      POD #1   Right Lung Lower Lobectomy         The patient is doing well clinically.  She is sitting upright in a chair, tolerating oral liquids with room air oxygen saturations of 100%.  She remains in sinus rhythm with stable blood pressure.  Urine output is good.  Total chest tube drainage has been 650 cc of serosanguineous output.  There is no air leak present.  She complains of discomfort, mostly in the area of her chest tube insertion sites.  Chest x-ray is satisfactory.  Labs were reviewed.  Awaiting pathology results of right lung lower lobe.        Assessment: Status post right lung lower lobectomy                        Acute anemia secondary to the expected blood loss of surgery                        Status post recent PCI and stenting of her coronary artery disease                        Type II Diabetes Mellitus      Plan:  Transfer patient to step-down unit            Increase magnesium oxide to 400 mg p.o. b.i.d.            Increase Plavix 75 mg p.o. b.i.d. for the next several days            Continue chest tube drainage            Glucerna supplements

## 2023-11-01 NOTE — CARE UPDATE
CONTINUE OXYGEN   11/01/23 0800   PRE-TX-O2   Device (Oxygen Therapy) nasal cannula   Flow (L/min) 2   Pulse Oximetry Type Continuous   $ Pulse Oximetry - Multiple Charge Pulse Oximetry - Multiple   Education   $ Education DME Oxygen;15 min   Respiratory Evaluation   $ Care Plan Tech Time 15 min   $ Eval/Re-eval Charges Re-evaluation

## 2023-11-02 LAB
BACTERIA SPEC ANAEROBE CULT: NORMAL
BASOPHILS # BLD AUTO: 0.04 K/UL (ref 0–0.2)
BASOPHILS NFR BLD: 0.4 % (ref 0–1.9)
DIFFERENTIAL METHOD BLD: ABNORMAL
EOSINOPHIL # BLD AUTO: 0.2 K/UL (ref 0–0.5)
EOSINOPHIL NFR BLD: 1.9 % (ref 0–8)
ERYTHROCYTE [DISTWIDTH] IN BLOOD BY AUTOMATED COUNT: 15 % (ref 11.5–14.5)
HCT VFR BLD AUTO: 32.9 % (ref 37–48.5)
HGB BLD-MCNC: 10.6 G/DL (ref 12–16)
IMM GRANULOCYTES # BLD AUTO: 0.03 K/UL (ref 0–0.04)
IMM GRANULOCYTES NFR BLD AUTO: 0.3 % (ref 0–0.5)
LYMPHOCYTES # BLD AUTO: 1 K/UL (ref 1–4.8)
LYMPHOCYTES NFR BLD: 10.3 % (ref 18–48)
MAGNESIUM SERPL-MCNC: 2 MG/DL (ref 1.6–2.6)
MCH RBC QN AUTO: 29 PG (ref 27–31)
MCHC RBC AUTO-ENTMCNC: 32.2 G/DL (ref 32–36)
MCV RBC AUTO: 90 FL (ref 82–98)
MONOCYTES # BLD AUTO: 1 K/UL (ref 0.3–1)
MONOCYTES NFR BLD: 10.6 % (ref 4–15)
NEUTROPHILS # BLD AUTO: 7.3 K/UL (ref 1.8–7.7)
NEUTROPHILS NFR BLD: 76.5 % (ref 38–73)
NRBC BLD-RTO: 0 /100 WBC
PLATELET # BLD AUTO: 256 K/UL (ref 150–450)
PMV BLD AUTO: 9.6 FL (ref 9.2–12.9)
RBC # BLD AUTO: 3.66 M/UL (ref 4–5.4)
WBC # BLD AUTO: 9.56 K/UL (ref 3.9–12.7)

## 2023-11-02 PROCEDURE — 99232 SBSQ HOSP IP/OBS MODERATE 35: CPT | Mod: ,,, | Performed by: INTERNAL MEDICINE

## 2023-11-02 PROCEDURE — 21000000 HC CCU ICU ROOM CHARGE

## 2023-11-02 PROCEDURE — 25000003 PHARM REV CODE 250: Performed by: THORACIC SURGERY (CARDIOTHORACIC VASCULAR SURGERY)

## 2023-11-02 PROCEDURE — 85025 COMPLETE CBC W/AUTO DIFF WBC: CPT | Performed by: THORACIC SURGERY (CARDIOTHORACIC VASCULAR SURGERY)

## 2023-11-02 PROCEDURE — 63600175 PHARM REV CODE 636 W HCPCS: Performed by: THORACIC SURGERY (CARDIOTHORACIC VASCULAR SURGERY)

## 2023-11-02 PROCEDURE — 83735 ASSAY OF MAGNESIUM: CPT | Performed by: THORACIC SURGERY (CARDIOTHORACIC VASCULAR SURGERY)

## 2023-11-02 PROCEDURE — 94761 N-INVAS EAR/PLS OXIMETRY MLT: CPT

## 2023-11-02 RX ORDER — TALC
6 POWDER (GRAM) TOPICAL ONCE
Status: COMPLETED | OUTPATIENT
Start: 2023-11-03 | End: 2023-11-03

## 2023-11-02 RX ORDER — LANOLIN ALCOHOL/MO/W.PET/CERES
400 CREAM (GRAM) TOPICAL DAILY
Status: DISCONTINUED | OUTPATIENT
Start: 2023-11-03 | End: 2023-11-05 | Stop reason: HOSPADM

## 2023-11-02 RX ADMIN — HYDROCODONE BITARTRATE AND ACETAMINOPHEN 1 TABLET: 7.5; 325 TABLET ORAL at 12:11

## 2023-11-02 RX ADMIN — MUPIROCIN 1 G: 20 OINTMENT TOPICAL at 09:11

## 2023-11-02 RX ADMIN — ATORVASTATIN CALCIUM 40 MG: 40 TABLET, FILM COATED ORAL at 08:11

## 2023-11-02 RX ADMIN — ASPIRIN 81 MG: 81 TABLET, COATED ORAL at 09:11

## 2023-11-02 RX ADMIN — METOPROLOL SUCCINATE 25 MG: 25 TABLET, EXTENDED RELEASE ORAL at 09:11

## 2023-11-02 RX ADMIN — Medication 400 MG: at 09:11

## 2023-11-02 RX ADMIN — GUAIFENESIN 600 MG: 600 TABLET, EXTENDED RELEASE ORAL at 08:11

## 2023-11-02 RX ADMIN — HYDROMORPHONE HYDROCHLORIDE 0.5 MG: 0.5 INJECTION, SOLUTION INTRAMUSCULAR; INTRAVENOUS; SUBCUTANEOUS at 01:11

## 2023-11-02 RX ADMIN — CEFAZOLIN SODIUM 1 G: 1 SOLUTION INTRAVENOUS at 09:11

## 2023-11-02 RX ADMIN — CLOPIDOGREL BISULFATE 75 MG: 75 TABLET, FILM COATED ORAL at 08:11

## 2023-11-02 RX ADMIN — HYDROCODONE BITARTRATE AND ACETAMINOPHEN 1 TABLET: 5; 325 TABLET ORAL at 08:11

## 2023-11-02 RX ADMIN — GABAPENTIN 100 MG: 100 CAPSULE ORAL at 09:11

## 2023-11-02 RX ADMIN — GABAPENTIN 100 MG: 100 CAPSULE ORAL at 08:11

## 2023-11-02 RX ADMIN — PANTOPRAZOLE SODIUM 40 MG: 40 TABLET, DELAYED RELEASE ORAL at 05:11

## 2023-11-02 RX ADMIN — HYDROCODONE BITARTRATE AND ACETAMINOPHEN 1 TABLET: 7.5; 325 TABLET ORAL at 09:11

## 2023-11-02 RX ADMIN — HYDROCODONE BITARTRATE AND ACETAMINOPHEN 1 TABLET: 7.5; 325 TABLET ORAL at 04:11

## 2023-11-02 RX ADMIN — CEFAZOLIN SODIUM 1 G: 1 SOLUTION INTRAVENOUS at 02:11

## 2023-11-02 RX ADMIN — MUPIROCIN 1 G: 20 OINTMENT TOPICAL at 08:11

## 2023-11-02 RX ADMIN — GUAIFENESIN 600 MG: 600 TABLET, EXTENDED RELEASE ORAL at 09:11

## 2023-11-02 RX ADMIN — CLOPIDOGREL BISULFATE 75 MG: 75 TABLET, FILM COATED ORAL at 09:11

## 2023-11-02 NOTE — PROGRESS NOTES
Pulmonary/Critical Care Progress Note      PATIENT NAME: Alexa Otero  MRN: [id number]  TODAY'S DATE: 2023  12:53 PM  ADMIT DATE: 10/31/2023  AGE: 83 y.o. : 1940      HPI:  The patient is an 83-year-old female who has been coughing and producing purulent mucus for the last year.  She underwent 2 bronchoscopies which both came back with debris.  She did have a PET scan which had some hypermetabolism in the edges of the right lower lobe and an inferior hilar node that was PET warm as well.  The patient has a remote history of endometrioid adenocarcinoma.  Prior to this surgery, the patient has had to have coronary artery stents placed.  Today she was brought to the OR in a right lower lobectomy was done.     the patient had a fairly uneventful night with the exception of pain control.     the patient is awake.  She is having some right lower anterior thoracic pain.  Her thoracostomy tube drainage output is decreasing.  Her pathology is still pending.    REVIEW OF SYSTEMS  GENERAL: Feeling good  EYES: Vision is good.  ENT: No sinusitis or pharyngitis.   HEART: No chest pain or palpitations.  LUNGS:  She does not like taking deep breaths.  GI: No Nausea, vomiting, constipation, diarrhea, or reflux.  : No dysuria, hesitancy, or nocturia.  SKIN: No lesions or rashes.  MUSCULOSKELETAL: No joint pain or myalgias.  NEURO: No headaches or neuropathy.  LYMPH: No edema or adenopathy.  PSYCH: No anxiety or depression.  ENDO: No weight change.    No change in the patient's Past Medical History, Past Surgical History, Social History or Family History since admission.      VITAL SIGNS (MOST RECENT)  Temp: 98 °F (36.7 °C) (23)  Pulse: 91 (23)  Resp: 17 (23)  BP: (!) 119/57 (23)  SpO2: 96 % (23)    INTAKE AND OUTPUT (LAST 24 HOURS):  Intake/Output Summary (Last 24 hours) at 2023 0958  Last data filed at 2023 0976  Gross per 24 hour   Intake 630 ml    Output 720 ml   Net -90 ml       WEIGHT  Wt Readings from Last 1 Encounters:   11/01/23 57.6 kg (126 lb 15.8 oz)       PHYSICAL EXAM  GENERAL: Older patient in no distress.   HEENT: Pupils equal and reactive. Extraocular movements intact. Nose intact. Pharynx moist.  NECK: Supple.   HEART: Regular rate and rhythm. No murmur or gallop auscultated.  LUNGS:  There crackles anteriorly laterally and posteriorly on the right.  The left lung is clear.  Lung excursion symmetrical.   Thoracostomy tube:  Serosanguineous drainage.  No leak seen.  ABDOMEN: Bowel sounds present. Non-tender, no masses palpated.  : Normal anatomy.    EXTREMITIES: Normal muscle tone and joint movement, no cyanosis or clubbing.   LYMPHATICS: No adenopathy palpated, no edema.  SKIN: Dry, intact, no lesions.   NEURO: Cranial nerves II-XII intact. Motor strength 5/5 bilaterally, upper and lower extremities.  Patient is ambulating to the bathroom.  PSYCH: Appropriate affect.      CBC LAST (LAST 24 HOURS)  Recent Labs   Lab 11/02/23  0419   WBC 9.56   RBC 3.66*   HGB 10.6*   HCT 32.9*   MCV 90   MCH 29.0   MCHC 32.2   RDW 15.0*      MPV 9.6   GRAN 76.5*  7.3   LYMPH 10.3*  1.0   MONO 10.6  1.0   BASO 0.04   NRBC 0       CHEMISTRY LAST (LAST 24 HOURS)  Recent Labs   Lab 11/02/23  0419   MG 2.0       LAST 7 DAYS MICROBIOLOGY   Microbiology Results (last 7 days)       Procedure Component Value Units Date/Time    Tissue culture [[id number]] Collected: 10/31/23 1127    Order Status: Completed Specimen: Tissue Updated: 11/01/23 1438     Aerobic Culture - Tissue No growth     Gram Stain Result Rare WBC's      No organisms seen    Narrative:      Right lower lung tissue culture    Fungus culture [[id number]] Collected: 10/31/23 1127    Order Status: Sent Specimen: Biopsy from Lung, Right Updated: 10/31/23 1216    AFB Culture & Smear [[id number]] Collected: 10/31/23 1127    Order Status: Sent Specimen: Biopsy from Lung, Right Updated: 10/31/23 1216     Culture, Anaerobe [[id number]] Collected: 10/31/23 1127    Order Status: Sent Specimen: Biopsy from Lung, Right Updated: 10/31/23 1215    Aerobic culture [[id number]] Collected: 10/31/23 1127    Order Status: Canceled Specimen: Tissue from Lung, Right     Gram stain [[id number]] Collected: 10/31/23 1127    Order Status: Canceled Specimen: Biopsy from Lung, Right             MOST RECENT IMAGING  X-Ray Chest AP Portable  XR CHEST 1 VIEW    CLINICAL HISTORY:  83 years Female Status post Right Lung Lower lobectomy    COMPARISON: November 1, 2023    FINDINGS: Cardiomediastinal silhouette is stable compared to prior. Mild rightward mediastinal shift and right hemidiaphragm elevation consistent with right lung volume loss status post right lower lobectomy. Right-sided chest tubes are in stable position. Right subclavian Mediport is unchanged. Small right apical pneumothorax. Pleural parenchymal opacity at the right lung base consistent with small volume pleural fluid as well as postoperative atelectasis. Left lung is clear. Bilateral calcified breast implants.    IMPRESSION:    Small right apical pneumothorax. Two right-sided chest tubes are stable.    Small-volume right basilar pleural fluid/atelectasis.    Electronically signed by:  Robert Montemayor MD  11/02/2023 07:25 AM CDT Workstation: [id number]      CURRENT VISIT EKG  No results found for this visit on 10/31/23.    ECHOCARDIOGRAM RESULTS  Results for orders placed during the hospital encounter of 10/13/23    Echo    Interpretation Summary    Left Ventricle: The left ventricle is normal in size. Normal wall thickness. Normal wall motion. There is normal systolic function with a visually estimated ejection fraction of 55 - 60%. Grade I diastolic dysfunction.    Right Ventricle: Normal right ventricular cavity size. Wall thickness is normal. Right ventricle wall motion  is normal. Systolic function is normal.    Aortic Valve: There is moderate aortic valve  sclerosis.    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 There is mild mitral annular calcification present. There is no stenosis. The mean pressure gradient across the mitral valve is 3 mmHg at a heart rate of  bpm. There is mild regurgitation.    Tricuspid Valve: There is mild regurgitation.  No pulmonary hypertension.    IVC/SVC: Normal venous pressure at 3 mmHg.      The patient is on room air         LAST ARTERIAL BLOOD GAS  ABG  Recent Labs   Lab 10/31/23  0947   PH 7.335*   PO2 152*   PCO2 45.9*   HCO3 24.5   BE -1       IMPRESSION AND PLAN  Destroyed right lower lobe presumably from chronic aspiration  Remote history of endometrioid adenocarcinoma  - cultures and pathology sent  Status post right lower lobectomy  - pain control better  - small apical pneumothorax, persisting  Coronary artery disease with recent stent  - will resume antiplatelet therapy today  Hypomagnesemia  - corrected  - electrolyte sliding scale in place  Anemia  - stable  - postop    Patient has transfer orders to step-down.  Will inform her when I learn of her path diagnosis    Neva Christian MD  Date of Service: 11/02/2023  12:53 PM

## 2023-11-02 NOTE — NURSING TRANSFER
Nursing Transfer Note      11/2/2023   2:14 PM    Nurse giving handoff: Fang  Nurse receiving handoff: Liz    Reason patient is being transferred: step down    Transfer To: 2525    Transfer via wheelchair    Transfer with cardiac monitoring    Transported by Fang      Telemetry: Rhythm NSR  Order for Tele Monitor? Yes    Additional Lines: Chest Tube    4eyes on Skin: yes    Medicines sent: n/a    Any special needs or follow-up needed: pain control    Patient belongings transferred with patient: Yes    Chart send with patient: Yes    Notified: spouse    Upon arrival to floor: cardiac monitor applied, patient oriented to room, call bell in reach, and bed in lowest position

## 2023-11-02 NOTE — PROGRESS NOTES
Date: November 2, 2023    POD #2   Right Lung Lower Lobectomy         The patient continues to do well clinically.  She was transferred out of the intensive care unit today.  She remains in sinus rhythm.  She is had a total of 1250 cc of serosanguineous drainage from her chest tube thus far.  Her pain is adequately controlled, however she is sore at the chest tube sites.  Appetite is fair.  She voices no major complaints.  Chest x-ray is satisfactory.    Hemoglobin = 10.6  Creatinine = 1.0  Magnesium 2.0  Potassium = 3.9      Assessment:  Status post right lung lower lobectomy                         Acute anemia secondary to the expected blood loss of surgery                         Status post recent PCI and stenting of her coronary artery disease                         Type II Diabetes Mellitus      Plan:  CBC tomorrow            Decrease magnesium oxide to 400 mg p.o. q.day            Continue chest tube drainage            Chest x-ray tomorrow

## 2023-11-02 NOTE — PLAN OF CARE
Problem: Infection  Goal: Absence of Infection Signs and Symptoms  Outcome: Ongoing, Progressing  Intervention: Prevent or Manage Infection  Flowsheets (Taken 11/2/2023 6489)  Infection Management: aseptic technique maintained     Problem: Skin Injury Risk Increased  Goal: Skin Health and Integrity  Outcome: Ongoing, Progressing

## 2023-11-03 ENCOUNTER — PATIENT MESSAGE (OUTPATIENT)
Dept: FAMILY MEDICINE | Facility: CLINIC | Age: 83
End: 2023-11-03
Payer: MEDICARE

## 2023-11-03 LAB
ALBUMIN SERPL BCP-MCNC: 3.1 G/DL (ref 3.5–5.2)
ALP SERPL-CCNC: 41 U/L (ref 55–135)
ALT SERPL W/O P-5'-P-CCNC: 6 U/L (ref 10–44)
ANION GAP SERPL CALC-SCNC: 11 MMOL/L (ref 8–16)
AST SERPL-CCNC: 12 U/L (ref 10–40)
BASOPHILS # BLD AUTO: 0.03 K/UL (ref 0–0.2)
BASOPHILS NFR BLD: 0.4 % (ref 0–1.9)
BILIRUB SERPL-MCNC: 0.4 MG/DL (ref 0.1–1)
BUN SERPL-MCNC: 19 MG/DL (ref 8–23)
CALCIUM SERPL-MCNC: 8.8 MG/DL (ref 8.7–10.5)
CHLORIDE SERPL-SCNC: 99 MMOL/L (ref 95–110)
CO2 SERPL-SCNC: 29 MMOL/L (ref 23–29)
CREAT SERPL-MCNC: 0.8 MG/DL (ref 0.5–1.4)
DIFFERENTIAL METHOD BLD: ABNORMAL
EOSINOPHIL # BLD AUTO: 0.3 K/UL (ref 0–0.5)
EOSINOPHIL NFR BLD: 3.8 % (ref 0–8)
ERYTHROCYTE [DISTWIDTH] IN BLOOD BY AUTOMATED COUNT: 14.7 % (ref 11.5–14.5)
EST. GFR  (NO RACE VARIABLE): >60 ML/MIN/1.73 M^2
GLUCOSE SERPL-MCNC: 130 MG/DL (ref 70–110)
HCT VFR BLD AUTO: 31.2 % (ref 37–48.5)
HGB BLD-MCNC: 10.2 G/DL (ref 12–16)
IMM GRANULOCYTES # BLD AUTO: 0.03 K/UL (ref 0–0.04)
IMM GRANULOCYTES NFR BLD AUTO: 0.4 % (ref 0–0.5)
LYMPHOCYTES # BLD AUTO: 0.9 K/UL (ref 1–4.8)
LYMPHOCYTES NFR BLD: 11.1 % (ref 18–48)
MAGNESIUM SERPL-MCNC: 1.8 MG/DL (ref 1.6–2.6)
MCH RBC QN AUTO: 28.8 PG (ref 27–31)
MCHC RBC AUTO-ENTMCNC: 32.7 G/DL (ref 32–36)
MCV RBC AUTO: 88 FL (ref 82–98)
MONOCYTES # BLD AUTO: 0.8 K/UL (ref 0.3–1)
MONOCYTES NFR BLD: 10.1 % (ref 4–15)
NEUTROPHILS # BLD AUTO: 5.9 K/UL (ref 1.8–7.7)
NEUTROPHILS NFR BLD: 74.2 % (ref 38–73)
NRBC BLD-RTO: 0 /100 WBC
PLATELET # BLD AUTO: 227 K/UL (ref 150–450)
PMV BLD AUTO: 9.6 FL (ref 9.2–12.9)
POTASSIUM SERPL-SCNC: 3.3 MMOL/L (ref 3.5–5.1)
PROT SERPL-MCNC: 5.7 G/DL (ref 6–8.4)
RBC # BLD AUTO: 3.54 M/UL (ref 4–5.4)
SODIUM SERPL-SCNC: 139 MMOL/L (ref 136–145)
WBC # BLD AUTO: 7.9 K/UL (ref 3.9–12.7)

## 2023-11-03 PROCEDURE — 83735 ASSAY OF MAGNESIUM: CPT | Performed by: THORACIC SURGERY (CARDIOTHORACIC VASCULAR SURGERY)

## 2023-11-03 PROCEDURE — 80053 COMPREHEN METABOLIC PANEL: CPT | Performed by: THORACIC SURGERY (CARDIOTHORACIC VASCULAR SURGERY)

## 2023-11-03 PROCEDURE — 94799 UNLISTED PULMONARY SVC/PX: CPT

## 2023-11-03 PROCEDURE — 63600175 PHARM REV CODE 636 W HCPCS: Performed by: THORACIC SURGERY (CARDIOTHORACIC VASCULAR SURGERY)

## 2023-11-03 PROCEDURE — 63600175 PHARM REV CODE 636 W HCPCS

## 2023-11-03 PROCEDURE — 99900035 HC TECH TIME PER 15 MIN (STAT)

## 2023-11-03 PROCEDURE — 93005 ELECTROCARDIOGRAM TRACING: CPT | Performed by: INTERNAL MEDICINE

## 2023-11-03 PROCEDURE — 21000000 HC CCU ICU ROOM CHARGE

## 2023-11-03 PROCEDURE — 63600175 PHARM REV CODE 636 W HCPCS: Performed by: NURSE PRACTITIONER

## 2023-11-03 PROCEDURE — 25000003 PHARM REV CODE 250

## 2023-11-03 PROCEDURE — 25000003 PHARM REV CODE 250: Performed by: INTERNAL MEDICINE

## 2023-11-03 PROCEDURE — 99232 SBSQ HOSP IP/OBS MODERATE 35: CPT | Mod: ,,, | Performed by: INTERNAL MEDICINE

## 2023-11-03 PROCEDURE — 25000003 PHARM REV CODE 250: Performed by: THORACIC SURGERY (CARDIOTHORACIC VASCULAR SURGERY)

## 2023-11-03 PROCEDURE — 85025 COMPLETE CBC W/AUTO DIFF WBC: CPT | Performed by: THORACIC SURGERY (CARDIOTHORACIC VASCULAR SURGERY)

## 2023-11-03 PROCEDURE — 36415 COLL VENOUS BLD VENIPUNCTURE: CPT | Performed by: THORACIC SURGERY (CARDIOTHORACIC VASCULAR SURGERY)

## 2023-11-03 PROCEDURE — 94761 N-INVAS EAR/PLS OXIMETRY MLT: CPT

## 2023-11-03 PROCEDURE — 93010 ELECTROCARDIOGRAM REPORT: CPT | Mod: ,,, | Performed by: INTERNAL MEDICINE

## 2023-11-03 PROCEDURE — 99223 1ST HOSP IP/OBS HIGH 75: CPT | Mod: ,,, | Performed by: INTERNAL MEDICINE

## 2023-11-03 RX ORDER — DILTIAZEM HYDROCHLORIDE 5 MG/ML
INJECTION INTRAVENOUS
Status: COMPLETED
Start: 2023-11-03 | End: 2023-11-03

## 2023-11-03 RX ORDER — LANOLIN ALCOHOL/MO/W.PET/CERES
800 CREAM (GRAM) TOPICAL ONCE
Status: COMPLETED | OUTPATIENT
Start: 2023-11-03 | End: 2023-11-03

## 2023-11-03 RX ORDER — POTASSIUM CHLORIDE 7.45 MG/ML
20 INJECTION INTRAVENOUS ONCE
Status: COMPLETED | OUTPATIENT
Start: 2023-11-03 | End: 2023-11-03

## 2023-11-03 RX ORDER — DIGOXIN 0.25 MG/ML
250 INJECTION INTRAMUSCULAR; INTRAVENOUS ONCE
Status: COMPLETED | OUTPATIENT
Start: 2023-11-03 | End: 2023-11-03

## 2023-11-03 RX ORDER — DILTIAZEM HYDROCHLORIDE 5 MG/ML
10 INJECTION INTRAVENOUS ONCE
Status: COMPLETED | OUTPATIENT
Start: 2023-11-03 | End: 2023-11-03

## 2023-11-03 RX ORDER — POTASSIUM CHLORIDE 20 MEQ/1
20 TABLET, EXTENDED RELEASE ORAL 2 TIMES DAILY
Status: DISCONTINUED | OUTPATIENT
Start: 2023-11-03 | End: 2023-11-05 | Stop reason: HOSPADM

## 2023-11-03 RX ADMIN — Medication 6 MG: at 12:11

## 2023-11-03 RX ADMIN — AMIODARONE HYDROCHLORIDE 0.5 MG/MIN: 1.8 INJECTION, SOLUTION INTRAVENOUS at 04:11

## 2023-11-03 RX ADMIN — DILTIAZEM HYDROCHLORIDE 10 MG: 5 INJECTION INTRAVENOUS at 09:11

## 2023-11-03 RX ADMIN — POTASSIUM CHLORIDE 20 MEQ: 1500 TABLET, EXTENDED RELEASE ORAL at 08:11

## 2023-11-03 RX ADMIN — ONDANSETRON 4 MG: 4 TABLET, ORALLY DISINTEGRATING ORAL at 09:11

## 2023-11-03 RX ADMIN — METOPROLOL SUCCINATE 25 MG: 25 TABLET, EXTENDED RELEASE ORAL at 08:11

## 2023-11-03 RX ADMIN — AMIODARONE HYDROCHLORIDE 360 MG: 1.8 INJECTION, SOLUTION INTRAVENOUS at 11:11

## 2023-11-03 RX ADMIN — PANTOPRAZOLE SODIUM 40 MG: 40 TABLET, DELAYED RELEASE ORAL at 05:11

## 2023-11-03 RX ADMIN — AMIODARONE HYDROCHLORIDE 0.5 MG/MIN: 1.8 INJECTION, SOLUTION INTRAVENOUS at 09:11

## 2023-11-03 RX ADMIN — DIGOXIN 250 MCG: 0.25 INJECTION INTRAMUSCULAR; INTRAVENOUS at 10:11

## 2023-11-03 RX ADMIN — HYDROCODONE BITARTRATE AND ACETAMINOPHEN 1 TABLET: 7.5; 325 TABLET ORAL at 07:11

## 2023-11-03 RX ADMIN — MUPIROCIN 1 G: 20 OINTMENT TOPICAL at 08:11

## 2023-11-03 RX ADMIN — GABAPENTIN 100 MG: 100 CAPSULE ORAL at 08:11

## 2023-11-03 RX ADMIN — ASPIRIN 81 MG: 81 TABLET, COATED ORAL at 08:11

## 2023-11-03 RX ADMIN — GUAIFENESIN 600 MG: 600 TABLET, EXTENDED RELEASE ORAL at 08:11

## 2023-11-03 RX ADMIN — DILTIAZEM HYDROCHLORIDE 10 MG/HR: 100 INJECTION, POWDER, LYOPHILIZED, FOR SOLUTION INTRAVENOUS at 09:11

## 2023-11-03 RX ADMIN — Medication 800 MG: at 08:11

## 2023-11-03 RX ADMIN — HYDROMORPHONE HYDROCHLORIDE 0.5 MG: 0.5 INJECTION, SOLUTION INTRAMUSCULAR; INTRAVENOUS; SUBCUTANEOUS at 07:11

## 2023-11-03 RX ADMIN — HYDROCODONE BITARTRATE AND ACETAMINOPHEN 1 TABLET: 7.5; 325 TABLET ORAL at 04:11

## 2023-11-03 RX ADMIN — CLOPIDOGREL BISULFATE 75 MG: 75 TABLET, FILM COATED ORAL at 08:11

## 2023-11-03 RX ADMIN — POTASSIUM CHLORIDE 20 MEQ: 7.46 INJECTION, SOLUTION INTRAVENOUS at 11:11

## 2023-11-03 RX ADMIN — AMIODARONE HYDROCHLORIDE 150 MG: 1.5 INJECTION, SOLUTION INTRAVENOUS at 10:11

## 2023-11-03 RX ADMIN — SODIUM CHLORIDE 250 ML: 0.9 INJECTION, SOLUTION INTRAVENOUS at 10:11

## 2023-11-03 RX ADMIN — ATORVASTATIN CALCIUM 40 MG: 40 TABLET, FILM COATED ORAL at 08:11

## 2023-11-03 NOTE — PROGRESS NOTES
Pulmonary/Critical Care Progress Note      PATIENT NAME: Alexa Otero  MRN: [id number]  TODAY'S DATE: 2023  12:53 PM  ADMIT DATE: 10/31/2023  AGE: 83 y.o. : 1940      HPI:  The patient is an 83-year-old female who has been coughing and producing purulent mucus for the last year.  She underwent 2 bronchoscopies which both came back with debris.  She did have a PET scan which had some hypermetabolism in the edges of the right lower lobe and an inferior hilar node that was PET warm as well.  The patient has a remote history of endometrioid adenocarcinoma.  Prior to this surgery, the patient has had to have coronary artery stents placed.  Today she was brought to the OR in a right lower lobectomy was done.     the patient had a fairly uneventful night with the exception of pain control.     the patient is awake.  She is having some right lower anterior thoracic pain.  Her thoracostomy tube drainage output is decreasing.  Her pathology is still pending.    11/3 the patient is doing well.  She still has her thoracostomy tube which is continuing to drain serosanguineous fluid.  Her pathology is not yet settled.  Dr. Valentin is sending it out to another reviewer.    REVIEW OF SYSTEMS  GENERAL: Feeling good  EYES: Vision is good.  ENT: No sinusitis or pharyngitis.   HEART: No chest pain or palpitations.  LUNGS:  She is having less pain.  GI: No Nausea, vomiting, constipation, diarrhea, or reflux.  : No dysuria, hesitancy, or nocturia.  SKIN: No lesions or rashes.  MUSCULOSKELETAL: No joint pain or myalgias.  NEURO: No headaches or neuropathy.  LYMPH: No edema or adenopathy.  PSYCH: No anxiety or depression.  ENDO: No weight change.    No change in the patient's Past Medical History, Past Surgical History, Social History or Family History since admission.      VITAL SIGNS (MOST RECENT)  Temp: 97.8 °F (36.6 °C) (23 0735)  Pulse: 81 (23 0808)  Resp: 15 (23 0808)  BP: (!) 123/58 (23  0735)  SpO2: 99 % (11/03/23 0808)    INTAKE AND OUTPUT (LAST 24 HOURS):  Intake/Output Summary (Last 24 hours) at 11/3/2023 0836  Last data filed at 11/3/2023 0535  Gross per 24 hour   Intake 610 ml   Output 400 ml   Net 210 ml       WEIGHT  Wt Readings from Last 1 Encounters:   11/01/23 57.6 kg (126 lb 15.8 oz)       PHYSICAL EXAM  GENERAL: Older patient in no distress.   HEENT: Pupils equal and reactive. Extraocular movements intact. Nose intact. Pharynx moist.  NECK: Supple.   HEART: Regular rate and rhythm. No murmur or gallop auscultated.  LUNGS:  There crackles in the posterior left lower lobe.  On the right there is a pleural rub.  Lung excursion symmetrical.   Thoracostomy tube:  Serosanguineous drainage.  No leak seen.  ABDOMEN: Bowel sounds present. Non-tender, no masses palpated.  : Normal anatomy.    EXTREMITIES: Normal muscle tone and joint movement, no cyanosis or clubbing.   LYMPHATICS: No adenopathy palpated, no edema.  SKIN: Dry, intact, no lesions.   NEURO: Cranial nerves II-XII intact. Motor strength 5/5 bilaterally, upper and lower extremities.  Patient is ambulating to the bathroom.  PSYCH: Appropriate affect.      CBC LAST (LAST 24 HOURS)  Recent Labs   Lab 11/03/23  0329   WBC 7.90   RBC 3.54*   HGB 10.2*   HCT 31.2*   MCV 88   MCH 28.8   MCHC 32.7   RDW 14.7*      MPV 9.6   GRAN 74.2*  5.9   LYMPH 11.1*  0.9*   MONO 10.1  0.8   BASO 0.03   NRBC 0       CHEMISTRY LAST (LAST 24 HOURS)  Recent Labs   Lab 11/03/23  0329      K 3.3*   CL 99   CO2 29   ANIONGAP 11   BUN 19   CREATININE 0.8   *   CALCIUM 8.8   MG 1.8   ALBUMIN 3.1*   PROT 5.7*   ALKPHOS 41*   ALT 6*   AST 12   BILITOT 0.4       LAST 7 DAYS MICROBIOLOGY   Microbiology Results (last 7 days)       Procedure Component Value Units Date/Time    AFB Culture & Smear [[id number]] Collected: 10/31/23 1127    Order Status: Completed Specimen: Tissue from Lung, Right Updated: 11/03/23 0749     AFB CULTURE STAIN No acid  fast bacilli seen.     AFB CULTURE STAIN Testing performed by:     AFB CULTURE STAIN [facility] [place]     [facility] [address] [facility]     [place] [address]     AFB CULTURE STAIN Dr.Brian Filiberto MD    Narrative:      Right lower lung tissue culture    Culture, Anaerobe [[id number]] Collected: 10/31/23 1127    Order Status: Completed Specimen: Tissue from Lung, Right Updated: 11/02/23 1506     Anaerobic Culture No anaerobes isolated    Narrative:      Right lower lung tissue culture    Tissue culture [[id number]] Collected: 10/31/23 1127    Order Status: Completed Specimen: Tissue Updated: 11/02/23 1044     Aerobic Culture - Tissue No growth     Gram Stain Result Rare WBC's      No organisms seen    Narrative:      Right lower lung tissue culture    Fungus culture [[id number]] Collected: 10/31/23 1127    Order Status: Sent Specimen: Biopsy from Lung, Right Updated: 10/31/23 1216    Aerobic culture [[id number]] Collected: 10/31/23 1127    Order Status: Canceled Specimen: Tissue from Lung, Right     Gram stain [[id number]] Collected: 10/31/23 1127    Order Status: Canceled Specimen: Biopsy from Lung, Right             MOST RECENT IMAGING  X-Ray Chest AP Portable  CLINICAL HISTORY:  83 years (1940) Female Status post Right Lung Lower Lobectomy    TECHNIQUE:  Portable AP radiograph the chest. One view.    COMPARISON:  Radiograph from November 2, 2023    FINDINGS:  There is a faint airspace opacity at the right lung base with elevation the right hemidiaphragm. Left lung is essentially clear. There is blunting of the right costophrenic angle consistent with trace pleural effusion. There is a trace subpulmonic right-sided pneumothorax. The heart is normal in size. Atheromatous calcifications are seen at the aortic arch. Osseous structures appear unchanged. The visualized upper abdomen is unremarkable.    Lines and tubes: two right sided chest tubes are present. There is a  right-sided subclavian medical infusion port with tip at the level of the SVC.    IMPRESSION:  Unchanged radiograph of the chest when accounting for differences in imaging technique.    .    Electronically signed by:  Anton Medina MD  11/03/2023 07:13 AM CDT Workstation: [id number]      CURRENT VISIT EKG  No results found for this visit on 10/31/23.    ECHOCARDIOGRAM RESULTS  Results for orders placed during the hospital encounter of 10/13/23    Echo    Interpretation Summary    Left Ventricle: The left ventricle is normal in size. Normal wall thickness. Normal wall motion. There is normal systolic function with a visually estimated ejection fraction of 55 - 60%. Grade I diastolic dysfunction.    Right Ventricle: Normal right ventricular cavity size. Wall thickness is normal. Right ventricle wall motion  is normal. Systolic function is normal.    Aortic Valve: There is moderate aortic valve sclerosis.    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 There is mild mitral annular calcification present. There is no stenosis. The mean pressure gradient across the mitral valve is 3 mmHg at a heart rate of  bpm. There is mild regurgitation.    Tricuspid Valve: There is mild regurgitation.  No pulmonary hypertension.    IVC/SVC: Normal venous pressure at 3 mmHg.      The patient is on room air         LAST ARTERIAL BLOOD GAS  ABG  Recent Labs   Lab 10/31/23  0947   PH 7.335*   PO2 152*   PCO2 45.9*   HCO3 24.5   BE -1       IMPRESSION AND PLAN  Destroyed right lower lobe presumably from chronic aspiration  Remote history of endometrioid adenocarcinoma  - cultures and pathology sent   Status post right lower lobectomy  - pain control better  - small apical pneumothorax, persisting  Coronary artery disease with recent stent  - will resume antiplatelet therapy today  Hypomagnesemia  - a little low today  - electrolyte sliding scale in place  Hypokalemia  - replace an trend  Anemia  - stable  -  postop  Mild hypoalbuminemia    Neva Christian MD  Date of Service: 11/03/2023  12:53 PM

## 2023-11-03 NOTE — CARE UPDATE
11/03/23 0808   PRE-TX-O2   Device (Oxygen Therapy) room air   SpO2 99 %   Pulse Oximetry Type Continuous   $ Pulse Oximetry - Multiple Charge Pulse Oximetry - Multiple   Pulse 81   Resp 15   Respiratory Evaluation   $ Care Plan Tech Time 15 min

## 2023-11-03 NOTE — CONSULTS
[facility]  Department of Cardiology  Consult Note      PATIENT NAME: Alexa Otero  MRN: [id number]  TODAY'S DATE: 11/03/2023  ADMIT DATE: 10/31/2023                          CONSULT REQUESTED BY: Washington Shankar MD    SUBJECTIVE     PRINCIPAL PROBLEM: <principal problem not specified>      REASON FOR CONSULT:    AFRVR      HPI:    Patient is an 83 year old female with past medical history hypertension, former tobacco use, hyperlipidemia, diabetes mellitus, ovarian cancer, endometrioid adenocarcinoma, CAD s/p recent PCI x3 earlier this month with cardiac arrest and defibrillation during intervention, rate lower lobe mass who underwent right lung lower lobectomy, with Dr. Shankar on 10/31.   This admission, patient went into AFib RVR.  No known history of atrial fibrillation.  During examination heart rates ranging from 150-170.  Chest tube in place with serosanguineous output.  Potassium 3.3 this a.m., magnesium 1.8.  Potassium being repleted per protocol.                  Review of patient's allergies indicates:  No Known Allergies    Past Medical History:   Diagnosis Date    Arthritis     Cardiac arrest 10/2023    Cataract     Colon polyp     Diabetes mellitus type II 2012    Encounter for blood transfusion     Extra-ovarian endometrioid adenocarcinoma 2020    GERD (gastroesophageal reflux disease)     History of chronic cough     clear productive    Hyperlipidemia     Hypertension     Macular degeneration     Ovarian cancer     PONV (postoperative nausea and vomiting)     Squamous cell carcinoma 1980's    precancer of cervix     Past Surgical History:   Procedure Laterality Date    AUGMENTATION OF BREAST      BRONCHOSCOPY WITH FLUOROSCOPY Right 05/11/2023    Procedure: BRONCHOSCOPY, WITH FLUOROSCOPY;  Surgeon: Neva Christian MD;  Location: [facility];  Service: Pulmonary;  Laterality: Right;    CATARACT EXTRACTION BILATERAL W/ ANTERIOR VITRECTOMY Bilateral     CHOLECYSTECTOMY      COLONOSCOPY       COLONOSCOPY N/A 04/20/2023    Procedure: COLONOSCOPY;  Surgeon: Sabino Stephenson MD;  Location: [facility] ENDO;  Service: Endoscopy;  Laterality: N/A;    CORONARY STENT PLACEMENT  10/2023    x 3    ESOPHAGOGASTRODUODENOSCOPY N/A 06/20/2018    Procedure: EGD (ESOPHAGOGASTRODUODENOSCOPY);  Surgeon: Sabino Stephenson MD;  Location: [facility] ENDO;  Service: Endoscopy;  Laterality: N/A;    ESOPHAGOGASTRODUODENOSCOPY N/A 03/31/2023    Procedure: EGD (ESOPHAGOGASTRODUODENOSCOPY);  Surgeon: Sabino Stephenson MD;  Location: [facility] ENDO;  Service: Endoscopy;  Laterality: N/A;    HYSTERECTOMY  1976    partial    INJECTION OF ANESTHETIC AGENT AROUND MEDIAL BRANCH NERVES INNERVATING LUMBAR FACET JOINT Right 05/10/2023    Procedure: Block-nerve-Lateral-branch-lumbar;  Surgeon: Agustin Robles MD;  Location: [facility] OR;  Service: Pain Management;  Laterality: Right;  L5 and s1,s2 LBB    INJECTION OF ANESTHETIC AGENT AROUND MEDIAL BRANCH NERVES INNERVATING LUMBAR FACET JOINT Right 05/30/2023    Procedure: Block-nerve-medial branch-lumbar;  Surgeon: Agustin Robles MD;  Location: [facility] OR;  Service: Pain Management;  Laterality: Right;  L5, s1 ,s2 LBB #2    INJECTION OF ANESTHETIC AGENT AROUND NERVE Right 10/31/2023    Procedure: INTERCOSTAL NERVE BLOCK;  Surgeon: Washington Shankar MD;  Location: [facility] OR;  Service: Cardiothoracic;  Laterality: Right;    INJECTION, SACROILIAC JOINT Right 01/26/2023    Procedure: INJECTION,SACROILIAC JOINT;  Surgeon: Agustin Robles MD;  Location: [facility] OR;  Service: Pain Management;  Laterality: Right;    INSERTION OF TUNNELED CENTRAL VENOUS CATHETER (CVC) WITH SUBCUTANEOUS PORT Right 10/19/2020    Procedure: INSERTION, PORT-A-CATH;  Surgeon: Misti Mcfarland MD;  Location: [facility] OR;  Service: General;  Laterality: Right;    INTRAMEDULLARY RODDING OF TROCHANTER OF FEMUR Right 11/28/2021    Procedure: INSERTION, INTRAMEDULLARY LUC, FEMUR, TROCHANTER/RIGHT TFN DR FAJARDO NOTIFIED REP;  Surgeon: Kp Fajardo MD;   Location: [facility] OR;  Service: Orthopedics;  Laterality: Right;  SKIP    ROBOT-ASSISTED LAPAROSCOPIC LYMPHADENECTOMY USING DA NELLA XI N/A 2020    Procedure: XI ROBOTIC LYMPHADENECTOMY-pelvic and kell-aortic;  Surgeon: Altagracia Ray MD;  Location: [facility] OR;  Service: OB/GYN;  Laterality: N/A;    ROBOT-ASSISTED LAPAROSCOPIC OMENTECTOMY USING DA NELLA XI N/A 2020    Procedure: XI ROBOTIC OMENTECTOMY;  Surgeon: Altagracia Ray MD;  Location: [facility] OR;  Service: OB/GYN;  Laterality: N/A;    ROBOT-ASSISTED LAPAROSCOPIC SALPINGO-OOPHORECTOMY USING DA NELLA XI Bilateral 2020    Procedure: XI ROBOTIC SALPINGO-OOPHORECTOMY;  Surgeon: Altagracia Ray MD;  Location: [facility] OR;  Service: OB/GYN;  Laterality: Bilateral;    THORACOSCOPIC BIOPSY OF PLEURA Right 10/31/2023    Procedure: VATS, WITH PLEURA BIOPSY;  Surgeon: Washington Shankar MD;  Location: [facility];  Service: Cardiothoracic;  Laterality: Right;  FROZEN SECTION   **KRISTEN-ASSISDT**    THORACOTOMY Right 10/31/2023    Procedure: THORACOTOMY;  Surgeon: Washington Shankar MD;  Location: [facility];  Service: Cardiothoracic;  Laterality: Right;    UPPER GASTROINTESTINAL ENDOSCOPY       Social History     Tobacco Use    Smoking status: Former     Current packs/day: 0.00     Average packs/day: 1 pack/day for 20.0 years (20.0 ttl pk-yrs)     Types: Cigarettes     Start date:      Quit date: 1981     Years since quittin.8    Smokeless tobacco: Never    Tobacco comments:     quit 40 yrs ago   Substance Use Topics    Alcohol use: Yes     Alcohol/week: 1.0 standard drink of alcohol     Types: 1 Glasses of wine per week     Comment: occasional    Drug use: No        REVIEW OF SYSTEMS    As mentioned in HPI    OBJECTIVE     VITAL SIGNS (Most Recent)  Temp: 97.7 °F (36.5 °C) (23 1110)  Pulse: 88 (23 1253)  Resp: 18 (23 1253)  BP: (!) 126/59 (23 1253)  SpO2: 100 % (23 1253)    VENTILATION STATUS  Resp: 18 (23  1253)  SpO2: 100 % (11/03/23 1253)           I & O (Last 24H):  Intake/Output Summary (Last 24 hours) at 11/3/2023 1447  Last data filed at 11/3/2023 1248  Gross per 24 hour   Intake 880 ml   Output 240 ml   Net 640 ml       WEIGHTS  Wt Readings from Last 3 Encounters:   11/01/23 0450 57.6 kg (126 lb 15.8 oz)   10/31/23 1600 56.2 kg (123 lb 14.4 oz)   10/31/23 0558 54.4 kg (120 lb)   10/27/23 0803 54.4 kg (120 lb)   10/15/23 0357 57 kg (125 lb 10.6 oz)   10/13/23 1810 56.8 kg (125 lb 3.5 oz)   10/13/23 1530 59.9 kg (132 lb)   10/13/23 0927 60.1 kg (132 lb 7.9 oz)       PHYSICAL EXAM    CONSTITUTIONAL: NAD  HEENT: Normocephalic. No pallor  NECK: no JVD  LUNGS: coarse breath sounds; CT in place- serosanguineous drainage  HEART: irregular rate and rhythm, S1, S2 normal, no murmur   ABDOMEN: soft, non-tender, bowel sounds normal  EXTREMITIES: No edema  SKIN: No rash  NEURO: AAO X 3  PSYCH: normal affect      HOME MEDICATIONS:  No current facility-administered medications on file prior to encounter.     Current Outpatient Medications on File Prior to Encounter   Medication Sig Dispense Refill    aspirin (ECOTRIN) 81 MG EC tablet Take 81 mg by mouth once daily.      benzonatate (TESSALON) 100 MG capsule Take 100 mg by mouth 3 (three) times daily as needed for Cough.      chlorthalidone (HYGROTEN) 25 MG Tab Take 25 mg by mouth once daily.      gabapentin (NEURONTIN) 100 MG capsule TAKE 1 CAPSULE(100 MG) BY MOUTH TWICE DAILY (Patient taking differently: Take 100 mg by mouth 2 (two) times daily.) 180 capsule 3    melatonin 5 mg Chew Take 2 tablets by mouth nightly as needed (insomnia).      metFORMIN (GLUCOPHAGE) 500 MG tablet TAKE 1 TABLET(500 MG) BY MOUTH TWICE DAILY WITH MEALS (Patient taking differently: Take 500 mg by mouth 2 (two) times a day.) 180 tablet 0    metoprolol succinate (TOPROL-XL) 25 MG 24 hr tablet Take 25 mg by mouth once daily.      omeprazole (PRILOSEC) 40 MG capsule TAKE 1 CAPSULE(40 MG) BY MOUTH EVERY  "DAY (Patient taking differently: Take 40 mg by mouth once daily.) 90 capsule 3    simvastatin (ZOCOR) 40 MG tablet Take 1 tablet (40 mg total) by mouth every evening. 90 tablet 3    traZODone (DESYREL) 50 MG tablet Take 1 tablet (50 mg total) by mouth nightly as needed for Insomnia. 30 tablet PRN    VIT A/VIT C/VIT E/ZINC/COPPER (ICAPS AREDS ORAL) Take 1 capsule by mouth 2 (two) times a day.       clopidogreL (PLAVIX) 75 mg tablet Take 75 mg by mouth once daily.         SCHEDULED MEDS:   aspirin  81 mg Oral Daily    atorvastatin  40 mg Oral QHS    clopidogreL  75 mg Oral BID    gabapentin  100 mg Oral BID    guaiFENesin  600 mg Oral BID    magnesium oxide  400 mg Oral Daily    metoprolol succinate  25 mg Oral Daily    mupirocin  1 g Nasal BID    pantoprazole  40 mg Oral Daily    potassium chloride  20 mEq Oral BID       CONTINUOUS INFUSIONS:   dilTIAZem 10 mg/hr (11/03/23 0941)       PRN MEDS:HYDROcodone-acetaminophen, HYDROcodone-acetaminophen, HYDROmorphone, ondansetron    LABS AND DIAGNOSTICS     CBC LAST 3 DAYS  Recent Labs   Lab 11/01/23  0314 11/02/23  0419 11/03/23  0329   WBC 10.69 9.56 7.90   RBC 3.40* 3.66* 3.54*   HGB 9.9* 10.6* 10.2*   HCT 30.2* 32.9* 31.2*   MCV 89 90 88   MCH 29.1 29.0 28.8   MCHC 32.8 32.2 32.7   RDW 15.3* 15.0* 14.7*    256 227   MPV 9.2 9.6 9.6   GRAN 84.9*  9.1* 76.5*  7.3 74.2*  5.9   LYMPH 5.8*  0.6* 10.3*  1.0 11.1*  0.9*   MONO 8.6  0.9 10.6  1.0 10.1  0.8   BASO 0.02 0.04 0.03   NRBC 0 0 0       COAGULATION LAST 3 DAYS  No results for input(s): "LABPT", "INR", "APTT" in the last 168 hours.    CHEMISTRY LAST 3 DAYS  Recent Labs   Lab 10/31/23  0600 10/31/23  0837 10/31/23  0848 10/31/23  0947 10/31/23  1705 11/01/23  0314 11/02/23  0419 11/03/23  0329   NA  --   --   --   --  137  --   --  139   K 3.8  --   --   --  3.9  --   --  3.3*   CL  --   --   --   --  106  --   --  99   CO2  --   --   --   --  24  --   --  29   ANIONGAP  --   --   --   --  7*  --   --  " "11   BUN  --   --   --   --  22  --   --  19   CREATININE  --   --   --   --  1.0  --   --  0.8   GLU  --   --   --   --  183*  --   --  130*   CALCIUM  --   --   --   --  8.6*  --   --  8.8   PH  --  7.442 7.412 7.335*  --   --   --   --    MG 1.5*  --   --   --   --  1.5* 2.0 1.8   ALBUMIN  --   --   --   --   --   --   --  3.1*   PROT  --   --   --   --   --   --   --  5.7*   ALKPHOS  --   --   --   --   --   --   --  41*   ALT  --   --   --   --   --   --   --  6*   AST  --   --   --   --   --   --   --  12   BILITOT  --   --   --   --   --   --   --  0.4       CARDIAC PROFILE LAST 3 DAYS  No results for input(s): "BNP", "CPK", "CPKMB", "LDH", "TROPONINI", "TROPONINIHS" in the last 168 hours.    ENDOCRINE LAST 3 DAYS  No results for input(s): "TSH", "PROCAL" in the last 168 hours.    LAST ARTERIAL BLOOD GAS  ABG  Recent Labs   Lab 10/31/23  0947   PH 7.335*   PO2 152*   PCO2 45.9*   HCO3 24.5   BE -1       LAST 7 DAYS MICROBIOLOGY   Microbiology Results (last 7 days)       Procedure Component Value Units Date/Time    Tissue culture [[id number]] Collected: 10/31/23 1127    Order Status: Completed Specimen: Tissue Updated: 11/03/23 1111     Aerobic Culture - Tissue No growth     Gram Stain Result Rare WBC's      No organisms seen    Narrative:      Right lower lung tissue culture    AFB Culture & Smear [[id number]] Collected: 10/31/23 1127    Order Status: Completed Specimen: Tissue from Lung, Right Updated: 11/03/23 0749     AFB CULTURE STAIN No acid fast bacilli seen.     AFB CULTURE STAIN Testing performed by:     AFB CULTURE STAIN Lab [facility] [place]     [facility] [address] [facility]     [place] [address]     AFB CULTURE STAIN Dr.Brian Filiberto MD    Narrative:      Right lower lung tissue culture    Culture, Anaerobe [[id number]] Collected: 10/31/23 1127    Order Status: Completed Specimen: Tissue from Lung, Right Updated: 11/02/23 1506     Anaerobic Culture No anaerobes " isolated    Narrative:      Right lower lung tissue culture    Fungus culture [[id number]] Collected: 10/31/23 1127    Order Status: Sent Specimen: Biopsy from Lung, Right Updated: 10/31/23 1216    Aerobic culture [[id number]] Collected: 10/31/23 1127    Order Status: Canceled Specimen: Tissue from Lung, Right     Gram stain [[id number]] Collected: 10/31/23 1127    Order Status: Canceled Specimen: Biopsy from Lung, Right             MOST RECENT IMAGING  X-Ray Chest AP Portable  CLINICAL HISTORY:  83 years (1940) Female Status post Right Lung Lower Lobectomy    TECHNIQUE:  Portable AP radiograph the chest. One view.    COMPARISON:  Radiograph from November 2, 2023    FINDINGS:  There is a faint airspace opacity at the right lung base with elevation the right hemidiaphragm. Left lung is essentially clear. There is blunting of the right costophrenic angle consistent with trace pleural effusion. There is a trace subpulmonic right-sided pneumothorax. The heart is normal in size. Atheromatous calcifications are seen at the aortic arch. Osseous structures appear unchanged. The visualized upper abdomen is unremarkable.    Lines and tubes: two right sided chest tubes are present. There is a right-sided subclavian medical infusion port with tip at the level of the SVC.    IMPRESSION:  Unchanged radiograph of the chest when accounting for differences in imaging technique.    .    Electronically signed by:  Anton Medina MD  11/03/2023 07:13 AM CDT Workstation: [id number]      ECHOCARDIOGRAM RESULTS (last 5)  Results for orders placed during the hospital encounter of 10/13/23    Echo    Interpretation Summary    Left Ventricle: The left ventricle is normal in size. Normal wall thickness. Normal wall motion. There is normal systolic function with a visually estimated ejection fraction of 55 - 60%. Grade I diastolic dysfunction.    Right Ventricle: Normal right ventricular cavity size. Wall thickness is normal. Right  ventricle wall motion  is normal. Systolic function is normal.    Aortic Valve: There is moderate aortic valve sclerosis.    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 There is mild mitral annular calcification present. There is no stenosis. The mean pressure gradient across the mitral valve is 3 mmHg at a heart rate of  bpm. There is mild regurgitation.    Tricuspid Valve: There is mild regurgitation.  No pulmonary hypertension.    IVC/SVC: Normal venous pressure at 3 mmHg.      CURRENT/PREVIOUS VISIT EKG  Results for orders placed or performed during the hospital encounter of 10/31/23   EKG 12-lead    Collection Time: 11/03/23  9:58 AM    Narrative    Test Reason : I48.91,    Vent. Rate : 136 BPM     Atrial Rate : 000 BPM     P-R Int : 000 ms          QRS Dur : 078 ms      QT Int : 314 ms       P-R-T Axes : 000 -03 037 degrees     QTc Int : 472 ms    Atrial fibrillation with rapid ventricular response  Anterior infarct (cited on or before 13-OCT-2023)  Abnormal ECG  When compared with ECG of 13-OCT-2023 10:53,  Atrial fibrillation has replaced Sinus rhythm  ST now depressed in Inferior leads  ST no longer elevated in Anterior leads  Nonspecific T wave abnormality now evident in Inferior leads  Nonspecific T wave abnormality now evident in Anterior-lateral leads    Referred By: MEHDI ECHAVARRIA           Confirmed By:            ASSESSMENT/PLAN:     There are no active hospital problems to display for this patient.      ASSESSMENT & PLAN:     AFib RVR  S/p right lower lobectomy  CAD s/p PCI x3 10/2023  Hypertension  Hypokalemia  Hypomagnesemia      RECOMMENDATIONS:    S/p right lower lobectomy.  AFib RVR this admission.  No known history of atrial fibrillation.  Rate remains uncontrolled on diltiazem drip.  Replete potassium. Continue to check and replace potassium and magnesium. Goal for potassium is 4.0, and goal for magnesium is 2.0.    One time dose of IV digoxin 250 mcg.  Continue aspirin and  plavix.  Amiodarone bolus and drip initiated.  Recommend systemic anticoagulation when cleared by CTS.   BP is soft. Hold antihypertensives for SBP <100.    Thank you for the consult. We will follow.       Donna Parker NP  Department of Cardiology  Date of Service: 11/03/2023

## 2023-11-04 LAB
BACTERIA TISS AEROBE CULT: NO GROWTH
BASOPHILS # BLD AUTO: 0.02 K/UL (ref 0–0.2)
BASOPHILS NFR BLD: 0.3 % (ref 0–1.9)
BLD PROD TYP BPU: NORMAL
BLD PROD TYP BPU: NORMAL
BLOOD UNIT EXPIRATION DATE: NORMAL
BLOOD UNIT EXPIRATION DATE: NORMAL
BLOOD UNIT TYPE CODE: 6200
BLOOD UNIT TYPE CODE: 6200
BLOOD UNIT TYPE: NORMAL
BLOOD UNIT TYPE: NORMAL
CODING SYSTEM: NORMAL
CODING SYSTEM: NORMAL
CROSSMATCH INTERPRETATION: NORMAL
CROSSMATCH INTERPRETATION: NORMAL
DIFFERENTIAL METHOD BLD: ABNORMAL
DISPENSE STATUS: NORMAL
DISPENSE STATUS: NORMAL
EOSINOPHIL # BLD AUTO: 0.4 K/UL (ref 0–0.5)
EOSINOPHIL NFR BLD: 6.3 % (ref 0–8)
ERYTHROCYTE [DISTWIDTH] IN BLOOD BY AUTOMATED COUNT: 14.6 % (ref 11.5–14.5)
GRAM STN SPEC: NORMAL
GRAM STN SPEC: NORMAL
HCT VFR BLD AUTO: 30.8 % (ref 37–48.5)
HGB BLD-MCNC: 9.8 G/DL (ref 12–16)
IMM GRANULOCYTES # BLD AUTO: 0.03 K/UL (ref 0–0.04)
IMM GRANULOCYTES NFR BLD AUTO: 0.4 % (ref 0–0.5)
LYMPHOCYTES # BLD AUTO: 1 K/UL (ref 1–4.8)
LYMPHOCYTES NFR BLD: 15 % (ref 18–48)
MAGNESIUM SERPL-MCNC: 1.7 MG/DL (ref 1.6–2.6)
MCH RBC QN AUTO: 29.1 PG (ref 27–31)
MCHC RBC AUTO-ENTMCNC: 31.8 G/DL (ref 32–36)
MCV RBC AUTO: 91 FL (ref 82–98)
MONOCYTES # BLD AUTO: 0.7 K/UL (ref 0.3–1)
MONOCYTES NFR BLD: 9.5 % (ref 4–15)
NEUTROPHILS # BLD AUTO: 4.7 K/UL (ref 1.8–7.7)
NEUTROPHILS NFR BLD: 68.5 % (ref 38–73)
NRBC BLD-RTO: 0 /100 WBC
NUM UNITS TRANS PACKED RBC: NORMAL
NUM UNITS TRANS PACKED RBC: NORMAL
PLATELET # BLD AUTO: 243 K/UL (ref 150–450)
PMV BLD AUTO: 9.4 FL (ref 9.2–12.9)
RBC # BLD AUTO: 3.37 M/UL (ref 4–5.4)
WBC # BLD AUTO: 6.93 K/UL (ref 3.9–12.7)

## 2023-11-04 PROCEDURE — 25000003 PHARM REV CODE 250: Performed by: INTERNAL MEDICINE

## 2023-11-04 PROCEDURE — 63600175 PHARM REV CODE 636 W HCPCS: Performed by: THORACIC SURGERY (CARDIOTHORACIC VASCULAR SURGERY)

## 2023-11-04 PROCEDURE — 94761 N-INVAS EAR/PLS OXIMETRY MLT: CPT

## 2023-11-04 PROCEDURE — 94799 UNLISTED PULMONARY SVC/PX: CPT

## 2023-11-04 PROCEDURE — 21000000 HC CCU ICU ROOM CHARGE

## 2023-11-04 PROCEDURE — 83735 ASSAY OF MAGNESIUM: CPT | Performed by: THORACIC SURGERY (CARDIOTHORACIC VASCULAR SURGERY)

## 2023-11-04 PROCEDURE — 99900035 HC TECH TIME PER 15 MIN (STAT)

## 2023-11-04 PROCEDURE — 63600175 PHARM REV CODE 636 W HCPCS

## 2023-11-04 PROCEDURE — 25000003 PHARM REV CODE 250

## 2023-11-04 PROCEDURE — 99233 SBSQ HOSP IP/OBS HIGH 50: CPT | Mod: ,,, | Performed by: INTERNAL MEDICINE

## 2023-11-04 PROCEDURE — 99900031 HC PATIENT EDUCATION (STAT)

## 2023-11-04 PROCEDURE — 99232 SBSQ HOSP IP/OBS MODERATE 35: CPT | Mod: ,,, | Performed by: INTERNAL MEDICINE

## 2023-11-04 PROCEDURE — 25000003 PHARM REV CODE 250: Performed by: THORACIC SURGERY (CARDIOTHORACIC VASCULAR SURGERY)

## 2023-11-04 PROCEDURE — 85025 COMPLETE CBC W/AUTO DIFF WBC: CPT | Performed by: THORACIC SURGERY (CARDIOTHORACIC VASCULAR SURGERY)

## 2023-11-04 RX ORDER — AMIODARONE HYDROCHLORIDE 200 MG/1
200 TABLET ORAL 2 TIMES DAILY
Status: DISCONTINUED | OUTPATIENT
Start: 2023-11-04 | End: 2023-11-05 | Stop reason: HOSPADM

## 2023-11-04 RX ORDER — ADHESIVE BANDAGE
30 BANDAGE TOPICAL ONCE
Status: COMPLETED | OUTPATIENT
Start: 2023-11-04 | End: 2023-11-04

## 2023-11-04 RX ORDER — METFORMIN HYDROCHLORIDE 500 MG/1
500 TABLET ORAL 2 TIMES DAILY WITH MEALS
Status: DISCONTINUED | OUTPATIENT
Start: 2023-11-04 | End: 2023-11-05 | Stop reason: HOSPADM

## 2023-11-04 RX ORDER — TALC
9 POWDER (GRAM) TOPICAL ONCE
Status: COMPLETED | OUTPATIENT
Start: 2023-11-04 | End: 2023-11-04

## 2023-11-04 RX ADMIN — Medication 400 MG: at 08:11

## 2023-11-04 RX ADMIN — MAGNESIUM HYDROXIDE 2400 MG: 400 SUSPENSION ORAL at 02:11

## 2023-11-04 RX ADMIN — GUAIFENESIN 600 MG: 600 TABLET, EXTENDED RELEASE ORAL at 08:11

## 2023-11-04 RX ADMIN — MUPIROCIN 1 G: 20 OINTMENT TOPICAL at 08:11

## 2023-11-04 RX ADMIN — HYDROMORPHONE HYDROCHLORIDE 0.5 MG: 0.5 INJECTION, SOLUTION INTRAMUSCULAR; INTRAVENOUS; SUBCUTANEOUS at 03:11

## 2023-11-04 RX ADMIN — ASPIRIN 81 MG: 81 TABLET, COATED ORAL at 08:11

## 2023-11-04 RX ADMIN — POTASSIUM CHLORIDE 20 MEQ: 1500 TABLET, EXTENDED RELEASE ORAL at 08:11

## 2023-11-04 RX ADMIN — AMIODARONE HYDROCHLORIDE 0.5 MG/MIN: 1.8 INJECTION, SOLUTION INTRAVENOUS at 01:11

## 2023-11-04 RX ADMIN — METOPROLOL SUCCINATE 25 MG: 25 TABLET, EXTENDED RELEASE ORAL at 08:11

## 2023-11-04 RX ADMIN — HYDROCODONE BITARTRATE AND ACETAMINOPHEN 1 TABLET: 5; 325 TABLET ORAL at 12:11

## 2023-11-04 RX ADMIN — METFORMIN HYDROCHLORIDE 500 MG: 500 TABLET ORAL at 04:11

## 2023-11-04 RX ADMIN — AMIODARONE HYDROCHLORIDE 200 MG: 200 TABLET ORAL at 08:11

## 2023-11-04 RX ADMIN — GABAPENTIN 100 MG: 100 CAPSULE ORAL at 08:11

## 2023-11-04 RX ADMIN — PANTOPRAZOLE SODIUM 40 MG: 40 TABLET, DELAYED RELEASE ORAL at 05:11

## 2023-11-04 RX ADMIN — HYDROMORPHONE HYDROCHLORIDE 0.5 MG: 0.5 INJECTION, SOLUTION INTRAMUSCULAR; INTRAVENOUS; SUBCUTANEOUS at 01:11

## 2023-11-04 RX ADMIN — CLOPIDOGREL BISULFATE 75 MG: 75 TABLET, FILM COATED ORAL at 08:11

## 2023-11-04 RX ADMIN — ONDANSETRON 4 MG: 4 TABLET, ORALLY DISINTEGRATING ORAL at 08:11

## 2023-11-04 RX ADMIN — AMIODARONE HYDROCHLORIDE 200 MG: 200 TABLET ORAL at 10:11

## 2023-11-04 RX ADMIN — HYDROCODONE BITARTRATE AND ACETAMINOPHEN 1 TABLET: 7.5; 325 TABLET ORAL at 08:11

## 2023-11-04 RX ADMIN — Medication 9 MG: at 09:11

## 2023-11-04 RX ADMIN — ATORVASTATIN CALCIUM 40 MG: 40 TABLET, FILM COATED ORAL at 08:11

## 2023-11-04 NOTE — PROGRESS NOTES
[facility]  Department of Cardiology  Consult Note      PATIENT NAME: Alexa Otero  MRN: [id number]  TODAY'S DATE: 11/04/2023  ADMIT DATE: 10/31/2023                          CONSULT REQUESTED BY: Washington Shankar MD    SUBJECTIVE     PRINCIPAL PROBLEM: <principal problem not specified>      REASON FOR CONSULT:    AFRVR      HPI:    Patient is an 83 year old female with past medical history hypertension, former tobacco use, hyperlipidemia, diabetes mellitus, ovarian cancer, endometrioid adenocarcinoma, CAD s/p recent PCI x3 earlier this month with cardiac arrest and defibrillation during intervention, rate lower lobe mass who underwent right lung lower lobectomy, with Dr. Shankar on 10/31.   This admission, patient went into AFib RVR.  No known history of atrial fibrillation.  During examination heart rates ranging from 150-170.  Chest tube in place with serosanguineous output.  Potassium 3.3 this a.m., magnesium 1.8.  Potassium being repleted per protocol.      INTERVAL HISTORY    11/4/23    Resting in bed.  NAD.  Remains in SR.  Reports intermittent nausea, relieved with Zofran. CT remains in place.              Review of patient's allergies indicates:  No Known Allergies    Past Medical History:   Diagnosis Date    Arthritis     Cardiac arrest 10/2023    Cataract     Colon polyp     Diabetes mellitus type II 2012    Encounter for blood transfusion     Extra-ovarian endometrioid adenocarcinoma 2020    GERD (gastroesophageal reflux disease)     History of chronic cough     clear productive    Hyperlipidemia     Hypertension     Macular degeneration     Ovarian cancer     PONV (postoperative nausea and vomiting)     Squamous cell carcinoma 1980's    precancer of cervix     Past Surgical History:   Procedure Laterality Date    AUGMENTATION OF BREAST      BRONCHOSCOPY WITH FLUOROSCOPY Right 05/11/2023    Procedure: BRONCHOSCOPY, WITH FLUOROSCOPY;  Surgeon: Neva Christian MD;  Location: [facility];   Service: Pulmonary;  Laterality: Right;    CATARACT EXTRACTION BILATERAL W/ ANTERIOR VITRECTOMY Bilateral     CHOLECYSTECTOMY      COLONOSCOPY      COLONOSCOPY N/A 04/20/2023    Procedure: COLONOSCOPY;  Surgeon: Sabino Stephenson MD;  Location: [facility] ENDO;  Service: Endoscopy;  Laterality: N/A;    CORONARY STENT PLACEMENT  10/2023    x 3    ESOPHAGOGASTRODUODENOSCOPY N/A 06/20/2018    Procedure: EGD (ESOPHAGOGASTRODUODENOSCOPY);  Surgeon: Sabino Stephenson MD;  Location: [facility] ENDO;  Service: Endoscopy;  Laterality: N/A;    ESOPHAGOGASTRODUODENOSCOPY N/A 03/31/2023    Procedure: EGD (ESOPHAGOGASTRODUODENOSCOPY);  Surgeon: Sabino Stephenson MD;  Location: [facility] ENDO;  Service: Endoscopy;  Laterality: N/A;    HYSTERECTOMY  1976    partial    INJECTION OF ANESTHETIC AGENT AROUND MEDIAL BRANCH NERVES INNERVATING LUMBAR FACET JOINT Right 05/10/2023    Procedure: Block-nerve-Lateral-branch-lumbar;  Surgeon: Agustin Robles MD;  Location: [facility];  Service: Pain Management;  Laterality: Right;  L5 and s1,s2 LBB    INJECTION OF ANESTHETIC AGENT AROUND MEDIAL BRANCH NERVES INNERVATING LUMBAR FACET JOINT Right 05/30/2023    Procedure: Block-nerve-medial branch-lumbar;  Surgeon: Agustin Robles MD;  Location: [facility];  Service: Pain Management;  Laterality: Right;  L5, s1 ,s2 LBB #2    INJECTION OF ANESTHETIC AGENT AROUND NERVE Right 10/31/2023    Procedure: INTERCOSTAL NERVE BLOCK;  Surgeon: Washington Shankar MD;  Location: [facility];  Service: Cardiothoracic;  Laterality: Right;    INJECTION, SACROILIAC JOINT Right 01/26/2023    Procedure: INJECTION,SACROILIAC JOINT;  Surgeon: Agustin Robles MD;  Location: [facility] OR;  Service: Pain Management;  Laterality: Right;    INSERTION OF TUNNELED CENTRAL VENOUS CATHETER (CVC) WITH SUBCUTANEOUS PORT Right 10/19/2020    Procedure: INSERTION, PORT-A-CATH;  Surgeon: Misti Mcfarland MD;  Location: [facility] OR;  Service: General;  Laterality: Right;    INTRAMEDULLARY RODDING OF TROCHANTER OF FEMUR Right  2021    Procedure: INSERTION, INTRAMEDULLARY LUC, FEMUR, TROCHANTER/RIGHT TFN DR FAJARDO NOTIFIED REP;  Surgeon: Kp Fajardo MD;  Location: [facility] OR;  Service: Orthopedics;  Laterality: Right;  SKIP    ROBOT-ASSISTED LAPAROSCOPIC LYMPHADENECTOMY USING DA NELLA XI N/A 2020    Procedure: XI ROBOTIC LYMPHADENECTOMY-pelvic and kell-aortic;  Surgeon: Altagracia Ray MD;  Location: [facility] OR;  Service: OB/GYN;  Laterality: N/A;    ROBOT-ASSISTED LAPAROSCOPIC OMENTECTOMY USING DA NELLA XI N/A 2020    Procedure: XI ROBOTIC OMENTECTOMY;  Surgeon: Altagracia Ray MD;  Location: [facility] OR;  Service: OB/GYN;  Laterality: N/A;    ROBOT-ASSISTED LAPAROSCOPIC SALPINGO-OOPHORECTOMY USING DA NELLA XI Bilateral 2020    Procedure: XI ROBOTIC SALPINGO-OOPHORECTOMY;  Surgeon: Altagracia Ray MD;  Location: [facility] OR;  Service: OB/GYN;  Laterality: Bilateral;    THORACOSCOPIC BIOPSY OF PLEURA Right 10/31/2023    Procedure: VATS, WITH PLEURA BIOPSY;  Surgeon: Washington Shankar MD;  Location: [facility] OR;  Service: Cardiothoracic;  Laterality: Right;  FROZEN SECTION   **KRISTEN-ASSISDT**    THORACOTOMY Right 10/31/2023    Procedure: THORACOTOMY;  Surgeon: Washington Shankar MD;  Location: [facility];  Service: Cardiothoracic;  Laterality: Right;    UPPER GASTROINTESTINAL ENDOSCOPY       Social History     Tobacco Use    Smoking status: Former     Current packs/day: 0.00     Average packs/day: 1 pack/day for 20.0 years (20.0 ttl pk-yrs)     Types: Cigarettes     Start date:      Quit date: 1981     Years since quittin.8    Smokeless tobacco: Never    Tobacco comments:     quit 40 yrs ago   Substance Use Topics    Alcohol use: Yes     Alcohol/week: 1.0 standard drink of alcohol     Types: 1 Glasses of wine per week     Comment: occasional    Drug use: No        REVIEW OF SYSTEMS    As mentioned in HPI    OBJECTIVE     VITAL SIGNS (Most Recent)  Temp: 98 °F (36.7 °C) (23 1100)  Pulse: 80  (11/04/23 1100)  Resp: 18 (11/04/23 1100)  BP: 131/62 (11/04/23 1100)  SpO2: 96 % (11/04/23 1226)    VENTILATION STATUS  Resp: 18 (11/04/23 1100)  SpO2: 96 % (11/04/23 1226)           I & O (Last 24H):  Intake/Output Summary (Last 24 hours) at 11/4/2023 1245  Last data filed at 11/4/2023 0903  Gross per 24 hour   Intake 726.01 ml   Output 130 ml   Net 596.01 ml       WEIGHTS  Wt Readings from Last 3 Encounters:   11/01/23 0450 57.6 kg (126 lb 15.8 oz)   10/31/23 1600 56.2 kg (123 lb 14.4 oz)   10/31/23 0558 54.4 kg (120 lb)   10/27/23 0803 54.4 kg (120 lb)   10/15/23 0357 57 kg (125 lb 10.6 oz)   10/13/23 1810 56.8 kg (125 lb 3.5 oz)   10/13/23 1530 59.9 kg (132 lb)   10/13/23 0927 60.1 kg (132 lb 7.9 oz)       PHYSICAL EXAM    CONSTITUTIONAL: NAD  HEENT: Normocephalic. No pallor  NECK: no JVD  LUNGS: coarse breath sounds; CT in place- serosanguineous drainage  HEART: regular rate and rhythm, S1, S2 normal, no murmur   ABDOMEN: soft, non-tender, bowel sounds normal  EXTREMITIES: No edema  SKIN: No rash  NEURO: AAO X 3  PSYCH: normal affect      HOME MEDICATIONS:  No current facility-administered medications on file prior to encounter.     Current Outpatient Medications on File Prior to Encounter   Medication Sig Dispense Refill    aspirin (ECOTRIN) 81 MG EC tablet Take 81 mg by mouth once daily.      benzonatate (TESSALON) 100 MG capsule Take 100 mg by mouth 3 (three) times daily as needed for Cough.      chlorthalidone (HYGROTEN) 25 MG Tab Take 25 mg by mouth once daily.      gabapentin (NEURONTIN) 100 MG capsule TAKE 1 CAPSULE(100 MG) BY MOUTH TWICE DAILY (Patient taking differently: Take 100 mg by mouth 2 (two) times daily.) 180 capsule 3    melatonin 5 mg Chew Take 2 tablets by mouth nightly as needed (insomnia).      metFORMIN (GLUCOPHAGE) 500 MG tablet TAKE 1 TABLET(500 MG) BY MOUTH TWICE DAILY WITH MEALS (Patient taking differently: Take 500 mg by mouth 2 (two) times a day.) 180 tablet 0    metoprolol  "succinate (TOPROL-XL) 25 MG 24 hr tablet Take 25 mg by mouth once daily.      omeprazole (PRILOSEC) 40 MG capsule TAKE 1 CAPSULE(40 MG) BY MOUTH EVERY DAY (Patient taking differently: Take 40 mg by mouth once daily.) 90 capsule 3    simvastatin (ZOCOR) 40 MG tablet Take 1 tablet (40 mg total) by mouth every evening. 90 tablet 3    traZODone (DESYREL) 50 MG tablet Take 1 tablet (50 mg total) by mouth nightly as needed for Insomnia. 30 tablet PRN    VIT A/VIT C/VIT E/ZINC/COPPER (ICAPS AREDS ORAL) Take 1 capsule by mouth 2 (two) times a day.       clopidogreL (PLAVIX) 75 mg tablet Take 75 mg by mouth once daily.         SCHEDULED MEDS:   amiodarone  200 mg Oral BID    aspirin  81 mg Oral Daily    atorvastatin  40 mg Oral QHS    clopidogreL  75 mg Oral BID    gabapentin  100 mg Oral BID    guaiFENesin  600 mg Oral BID    magnesium oxide  400 mg Oral Daily    metoprolol succinate  25 mg Oral Daily    mupirocin  1 g Nasal BID    pantoprazole  40 mg Oral Daily    potassium chloride  20 mEq Oral BID       CONTINUOUS INFUSIONS:        PRN MEDS:HYDROcodone-acetaminophen, HYDROcodone-acetaminophen, HYDROmorphone, ondansetron    LABS AND DIAGNOSTICS     CBC LAST 3 DAYS  Recent Labs   Lab 11/02/23  0419 11/03/23  0329 11/04/23  0445   WBC 9.56 7.90 6.93   RBC 3.66* 3.54* 3.37*   HGB 10.6* 10.2* 9.8*   HCT 32.9* 31.2* 30.8*   MCV 90 88 91   MCH 29.0 28.8 29.1   MCHC 32.2 32.7 31.8*   RDW 15.0* 14.7* 14.6*    227 243   MPV 9.6 9.6 9.4   GRAN 76.5*  7.3 74.2*  5.9 68.5  4.7   LYMPH 10.3*  1.0 11.1*  0.9* 15.0*  1.0   MONO 10.6  1.0 10.1  0.8 9.5  0.7   BASO 0.04 0.03 0.02   NRBC 0 0 0       COAGULATION LAST 3 DAYS  No results for input(s): "LABPT", "INR", "APTT" in the last 168 hours.    CHEMISTRY LAST 3 DAYS  Recent Labs   Lab 10/31/23  0600 10/31/23  0837 10/31/23  0848 10/31/23  0947 10/31/23  1705 11/01/23  0314 11/02/23  0419 11/03/23  0329 11/04/23  0445   NA  --   --   --   --  137  --   --  139  --    K 3.8 " " --   --   --  3.9  --   --  3.3*  --    CL  --   --   --   --  106  --   --  99  --    CO2  --   --   --   --  24  --   --  29  --    ANIONGAP  --   --   --   --  7*  --   --  11  --    BUN  --   --   --   --  22  --   --  19  --    CREATININE  --   --   --   --  1.0  --   --  0.8  --    GLU  --   --   --   --  183*  --   --  130*  --    CALCIUM  --   --   --   --  8.6*  --   --  8.8  --    PH  --  7.442 7.412 7.335*  --   --   --   --   --    MG 1.5*  --   --   --   --    < > 2.0 1.8 1.7   ALBUMIN  --   --   --   --   --   --   --  3.1*  --    PROT  --   --   --   --   --   --   --  5.7*  --    ALKPHOS  --   --   --   --   --   --   --  41*  --    ALT  --   --   --   --   --   --   --  6*  --    AST  --   --   --   --   --   --   --  12  --    BILITOT  --   --   --   --   --   --   --  0.4  --     < > = values in this interval not displayed.       CARDIAC PROFILE LAST 3 DAYS  No results for input(s): "BNP", "CPK", "CPKMB", "LDH", "TROPONINI", "TROPONINIHS" in the last 168 hours.    ENDOCRINE LAST 3 DAYS  No results for input(s): "TSH", "PROCAL" in the last 168 hours.    LAST ARTERIAL BLOOD GAS  ABG  Recent Labs   Lab 10/31/23  0947   PH 7.335*   PO2 152*   PCO2 45.9*   HCO3 24.5   BE -1       LAST 7 DAYS MICROBIOLOGY   Microbiology Results (last 7 days)       Procedure Component Value Units Date/Time    Tissue culture [[id number]] Collected: 10/31/23 1127    Order Status: Completed Specimen: Tissue Updated: 11/04/23 1005     Aerobic Culture - Tissue No growth     Gram Stain Result Rare WBC's      No organisms seen    Narrative:      Right lower lung tissue culture    AFB Culture & Smear [[id number]] Collected: 10/31/23 1127    Order Status: Completed Specimen: Tissue from Lung, Right Updated: 11/03/23 0749     AFB CULTURE STAIN No acid fast bacilli seen.     AFB CULTURE STAIN Testing performed by:     AFB CULTURE STAIN Lab [facility]     AFB CULTURE STAIN [address] [facility]     AFB [place]ULTURE [place]AIN " [place] [address]     AFB CULTURE STAIN Dr.Brian Filiberto MD    Narrative:      Right lower lung tissue culture    Culture, Anaerobe [[id number]] Collected: 10/31/23 1127    Order Status: Completed Specimen: Tissue from Lung, Right Updated: 11/02/23 1506     Anaerobic Culture No anaerobes isolated    Narrative:      Right lower lung tissue culture    Fungus culture [[id number]] Collected: 10/31/23 1127    Order Status: Sent Specimen: Biopsy from Lung, Right Updated: 10/31/23 1216    Aerobic culture [[id number]] Collected: 10/31/23 1127    Order Status: Canceled Specimen: Tissue from Lung, Right     Gram stain [[id number]] Collected: 10/31/23 1127    Order Status: Canceled Specimen: Biopsy from Lung, Right             MOST RECENT IMAGING  X-Ray Chest AP Portable  CLINICAL HISTORY:  83 years (1940) Female Status post Right Lung Lower Lobectomy    TECHNIQUE:  Portable AP radiograph the chest. One view.    COMPARISON:  Radiograph from November 2, 2023    FINDINGS:  There is a faint airspace opacity at the right lung base with elevation the right hemidiaphragm. Left lung is essentially clear. There is blunting of the right costophrenic angle consistent with trace pleural effusion. There is a trace subpulmonic right-sided pneumothorax. The heart is normal in size. Atheromatous calcifications are seen at the aortic arch. Osseous structures appear unchanged. The visualized upper abdomen is unremarkable.    Lines and tubes: two right sided chest tubes are present. There is a right-sided subclavian medical infusion port with tip at the level of the SVC.    IMPRESSION:  Unchanged radiograph of the chest when accounting for differences in imaging technique.    .    Electronically signed by:  Anton Medina MD  11/03/2023 07:13 AM CDT Workstation: [id number]      ECHOCARDIOGRAM RESULTS (last 5)  Results for orders placed during the hospital encounter of 10/13/23    Echo    Interpretation Summary    Left  Ventricle: The left ventricle is normal in size. Normal wall thickness. Normal wall motion. There is normal systolic function with a visually estimated ejection fraction of 55 - 60%. Grade I diastolic dysfunction.    Right Ventricle: Normal right ventricular cavity size. Wall thickness is normal. Right ventricle wall motion  is normal. Systolic function is normal.    Aortic Valve: There is moderate aortic valve sclerosis.    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 There is mild mitral annular calcification present. There is no stenosis. The mean pressure gradient across the mitral valve is 3 mmHg at a heart rate of  bpm. There is mild regurgitation.    Tricuspid Valve: There is mild regurgitation.  No pulmonary hypertension.    IVC/SVC: Normal venous pressure at 3 mmHg.      CURRENT/PREVIOUS VISIT EKG  Results for orders placed or performed during the hospital encounter of 10/31/23   EKG 12-lead    Collection Time: 11/03/23  9:58 AM    Narrative    Test Reason : I48.91,    Vent. Rate : 136 BPM     Atrial Rate : 000 BPM     P-R Int : 000 ms          QRS Dur : 078 ms      QT Int : 314 ms       P-R-T Axes : 000 -03 037 degrees     QTc Int : 472 ms    Atrial fibrillation with rapid ventricular response  Anterior infarct (cited on or before 13-OCT-2023)  Abnormal ECG  When compared with ECG of 13-OCT-2023 10:53,  Atrial fibrillation has replaced Sinus rhythm  ST now depressed in Inferior leads  ST no longer elevated in Anterior leads  Nonspecific T wave abnormality now evident in Inferior leads  Nonspecific T wave abnormality now evident in Anterior-lateral leads    Referred By: MEHDI ECHAVARRIA           Confirmed By:            ASSESSMENT/PLAN:     There are no active hospital problems to display for this patient.      ASSESSMENT & PLAN:     AFib RVR  S/p right lower lobectomy  CAD s/p PCI x3 10/2023  Hypertension  Hypokalemia  Hypomagnesemia      RECOMMENDATIONS:    S/p right lower  lobectomy.  Remains in SR.  Stop IV amiodarone. Transition to PO amio 200 mg BID.  Replete potassium. Continue to check and replace potassium and magnesium. Goal for potassium is 4.0, and goal for magnesium is 2.0.    Continue aspirin and plavix.  Amiodarone bolus and drip initiated.  Recommend systemic anticoagulation when cleared by CTS. When cleared, recommend Eliquis 5 mg BID with Plavix 75 mg daily.   Thank you for the consult. We will follow.       Donna Parker NP  Department of Cardiology  Date of Service: 11/04/2023

## 2023-11-04 NOTE — PROGRESS NOTES
Pulmonary/Critical Care Progress Note      PATIENT NAME: Alexa Otero  MRN: [id number]  TODAY'S DATE: 2023  12:53 PM  ADMIT DATE: 10/31/2023  AGE: 83 y.o. : 1940      HPI:  The patient is an 83-year-old female who has been coughing and producing purulent mucus for the last year.  She underwent 2 bronchoscopies which both came back with debris.  She did have a PET scan which had some hypermetabolism in the edges of the right lower lobe and an inferior hilar node that was PET warm as well.  The patient has a remote history of endometrioid adenocarcinoma.  Prior to this surgery, the patient has had to have coronary artery stents placed.  Today she was brought to the OR in a right lower lobectomy was done.     the patient had a fairly uneventful night with the exception of pain control.     the patient is awake.  She is having some right lower anterior thoracic pain.  Her thoracostomy tube drainage output is decreasing.  Her pathology is still pending.    11/3 the patient is doing well.  She still has her thoracostomy tube which is continuing to drain serosanguineous fluid.  Her pathology is not yet settled.  Dr. Valentin is sending it out to another reviewer.    - her pain is controlled. R chest tube in place w/ minimal serosanguinous output. Path is pending    REVIEW OF SYSTEMS  GENERAL: Feeling good  EYES: Vision is good.  ENT: No sinusitis or pharyngitis.   HEART: No chest pain or palpitations.  LUNGS:  She is having less pain.  GI: No Nausea, vomiting, constipation, diarrhea, or reflux.  : No dysuria, hesitancy, or nocturia.  SKIN: No lesions or rashes.  MUSCULOSKELETAL: No joint pain or myalgias.  NEURO: No headaches or neuropathy.  LYMPH: No edema or adenopathy.  PSYCH: No anxiety or depression.  ENDO: No weight change.    No change in the patient's Past Medical History, Past Surgical History, Social History or Family History since admission.      VITAL SIGNS (MOST RECENT)  Temp: 98 °F  (36.7 °C) (11/04/23 1100)  Pulse: 80 (11/04/23 1100)  Resp: 18 (11/04/23 1100)  BP: 131/62 (11/04/23 1100)  SpO2: 96 % (11/04/23 1500)    INTAKE AND OUTPUT (LAST 24 HOURS):  Intake/Output Summary (Last 24 hours) at 11/4/2023 1528  Last data filed at 11/4/2023 0903  Gross per 24 hour   Intake 486.01 ml   Output 130 ml   Net 356.01 ml         WEIGHT  Wt Readings from Last 1 Encounters:   11/01/23 57.6 kg (126 lb 15.8 oz)       PHYSICAL EXAM  GENERAL: Older patient in no distress.   HEENT: Pupils equal and reactive. Extraocular movements intact. Nose intact. Pharynx moist.  NECK: Supple.   HEART: Regular rate and rhythm. No murmur or gallop auscultated.  LUNGS:  There crackles in the posterior left lower lobe.  On the right there is a pleural rub.  Lung excursion symmetrical.   Thoracostomy tube:  Serosanguineous drainage.  No leak seen.  ABDOMEN: Bowel sounds present. Non-tender, no masses palpated.  : Normal anatomy.    EXTREMITIES: Normal muscle tone and joint movement, no cyanosis or clubbing.   LYMPHATICS: No adenopathy palpated, no edema.  SKIN: Dry, intact, no lesions.   NEURO: Cranial nerves II-XII intact. Motor strength 5/5 bilaterally, upper and lower extremities.  Patient is ambulating to the bathroom.  PSYCH: Appropriate affect.      CBC LAST (LAST 24 HOURS)  Recent Labs   Lab 11/04/23  0445   WBC 6.93   RBC 3.37*   HGB 9.8*   HCT 30.8*   MCV 91   MCH 29.1   MCHC 31.8*   RDW 14.6*      MPV 9.4   GRAN 68.5  4.7   LYMPH 15.0*  1.0   MONO 9.5  0.7   BASO 0.02   NRBC 0         CHEMISTRY LAST (LAST 24 HOURS)  Recent Labs   Lab 11/04/23  0445   MG 1.7         LAST 7 DAYS MICROBIOLOGY   Microbiology Results (last 7 days)       Procedure Component Value Units Date/Time    Tissue culture [[id number]] Collected: 10/31/23 1127    Order Status: Completed Specimen: Tissue Updated: 11/04/23 1005     Aerobic Culture - Tissue No growth     Gram Stain Result Rare WBC's      No organisms seen    Narrative:       Right lower lung tissue culture    AFB Culture & Smear [[id number]] Collected: 10/31/23 1127    Order Status: Completed Specimen: Tissue from Lung, Right Updated: 11/03/23 0749     AFB CULTURE STAIN No acid fast bacilli seen.     AFB CULTURE STAIN Testing performed by:     AFB CULTURE STAIN Lab [facility]     AFB CULTURE STAIN [address] [facility]     [place] [address]     AFB CULTURE STAIN Dr.Brian Filiberto MD    Narrative:      Right lower lung tissue culture    Culture, Anaerobe [[id number]] Collected: 10/31/23 1127    Order Status: Completed Specimen: Tissue from Lung, Right Updated: 11/02/23 1506     Anaerobic Culture No anaerobes isolated    Narrative:      Right lower lung tissue culture    Fungus culture [[id number]] Collected: 10/31/23 1127    Order Status: Sent Specimen: Biopsy from Lung, Right Updated: 10/31/23 1216    Aerobic culture [[id number]] Collected: 10/31/23 1127    Order Status: Canceled Specimen: Tissue from Lung, Right     Gram stain [[id number]] Collected: 10/31/23 1127    Order Status: Canceled Specimen: Biopsy from Lung, Right             MOST RECENT IMAGING  X-Ray Chest AP Portable  CLINICAL HISTORY:  83 years (1940) Female Status post Right Lung Lower Lobectomy    TECHNIQUE:  Portable AP radiograph the chest. One view.    COMPARISON:  Radiograph from November 2, 2023    FINDINGS:  There is a faint airspace opacity at the right lung base with elevation the right hemidiaphragm. Left lung is essentially clear. There is blunting of the right costophrenic angle consistent with trace pleural effusion. There is a trace subpulmonic right-sided pneumothorax. The heart is normal in size. Atheromatous calcifications are seen at the aortic arch. Osseous structures appear unchanged. The visualized upper abdomen is unremarkable.    Lines and tubes: two right sided chest tubes are present. There is a right-sided subclavian medical infusion port with tip at the level  of the SVC.    IMPRESSION:  Unchanged radiograph of the chest when accounting for differences in imaging technique.    .    Electronically signed by:  Anton Medina MD  11/03/2023 07:13 AM CDT Workstation: [id number]      CURRENT VISIT EKG  No results found for this visit on 10/31/23.    ECHOCARDIOGRAM RESULTS  Results for orders placed during the hospital encounter of 10/13/23    Echo    Interpretation Summary    Left Ventricle: The left ventricle is normal in size. Normal wall thickness. Normal wall motion. There is normal systolic function with a visually estimated ejection fraction of 55 - 60%. Grade I diastolic dysfunction.    Right Ventricle: Normal right ventricular cavity size. Wall thickness is normal. Right ventricle wall motion  is normal. Systolic function is normal.    Aortic Valve: There is moderate aortic valve sclerosis.    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 There is mild mitral annular calcification present. There is no stenosis. The mean pressure gradient across the mitral valve is 3 mmHg at a heart rate of  bpm. There is mild regurgitation.    Tricuspid Valve: There is mild regurgitation.  No pulmonary hypertension.    IVC/SVC: Normal venous pressure at 3 mmHg.      The patient is on room air         LAST ARTERIAL BLOOD GAS  ABG  Recent Labs   Lab 10/31/23  0947   PH 7.335*   PO2 152*   PCO2 45.9*   HCO3 24.5   BE -1         IMPRESSION AND PLAN  Destroyed right lower lobe presumably from chronic aspiration  Remote history of endometrioid adenocarcinoma  - cultures and pathology sent   Status post right lower lobectomy  - pain control better  - CXR today is pending  - management of chest tube per surgeon  Coronary artery disease with recent stent  - will resume antiplatelet therapy today  Hypomagnesemia  - a little low today  - electrolyte sliding scale in place  Hypokalemia  - replace an trend  Anemia  - stable  - postop  Mild hypoalbuminemia    Sudha Nuñez,  MD  Date of Service: 11/04/2023  12:53 PM

## 2023-11-04 NOTE — CARE UPDATE
11/04/23 0837   PRE-TX-O2   Device (Oxygen Therapy) room air   SpO2 97 %   Pulse Oximetry Type Continuous   $ Pulse Oximetry - Multiple Charge Pulse Oximetry - Multiple   Pulse 80   Resp 16   Respiratory Evaluation   $ Care Plan Tech Time 15 min

## 2023-11-04 NOTE — PROGRESS NOTES
Date: November 4, 2023      POD #4   Right Lung Lower Lobectomy         The patient had an uneventful night.  She remains in sinus rhythm.  Her pain is adequately controlled.  Pulse ox saturation on room air at rest is 99%.  There has been minimal output from her chest tubes overnight.  There is no air leak evident. She acknowledges flatus but no bowel movement.  She is in good spirits.  Input from the Cardiology Services appreciated.  Her amiodarone drip should be finished shortly.  In view of her recent lobectomy, I am a bit hesitant to start anticoagulation just yet.  Physical examination remains stable.      Creatinine =0.8  Hemoglobin =9.8  Potassium = 3.3  Platelets = 243,000      Assessment:  Status Post Right Lung Lower Lobectomy / Pathology pending                         Acute Anemia Secondary to the Expected Blood Loss of Surgery                         Paroxysmal Atrial Fibrillation / Converted to Sinus Rhythm                         Status Post Recent PCI and Stenting of her Coronary Artery Disease                         History of Type II Diabetes Mellitus      Plan: Milk of Magnesia 30 cc po once today           Chest tubes removed           Okay to shower            Resume metformin 500 mg p.o. b.i.d.            Two-view chest x-ray tomorrow morning            Possible discharge home tomorrow

## 2023-11-04 NOTE — PROGRESS NOTES
Date: November 3, 2023      POD #3   Right Lung Lower Lobectomy          The patient developed atrial fibrillation with rapid ventricular response.  She is converted with the medical therapy as directed by the Cardiology Service.  There has been 250 cc of serosanguineous drainage out the chest tube since I last saw her, now for a total of 1500 cc in the Pleur-evac chamber.  There is no air leak evident.  Her pain is adequately controlled at this point.  Chest x-ray is satisfactory.  Labs were reviewed.      Plan:  Continue chest tube drainage, possible removal of chest tube tomorrow.

## 2023-11-05 VITALS
OXYGEN SATURATION: 96 % | SYSTOLIC BLOOD PRESSURE: 111 MMHG | WEIGHT: 127 LBS | HEIGHT: 63 IN | HEART RATE: 79 BPM | TEMPERATURE: 98 F | RESPIRATION RATE: 17 BRPM | BODY MASS INDEX: 22.5 KG/M2 | DIASTOLIC BLOOD PRESSURE: 58 MMHG

## 2023-11-05 PROBLEM — Z90.2 STATUS POST LOBECTOMY OF LUNG: Status: ACTIVE | Noted: 2023-11-05

## 2023-11-05 PROBLEM — I48.0 PAROXYSMAL ATRIAL FIBRILLATION: Status: ACTIVE | Noted: 2023-11-05

## 2023-11-05 PROBLEM — R91.8 LUNG INFILTRATE: Status: ACTIVE | Noted: 2023-11-05

## 2023-11-05 LAB
BASOPHILS # BLD AUTO: 0.03 K/UL (ref 0–0.2)
BASOPHILS NFR BLD: 0.4 % (ref 0–1.9)
DIFFERENTIAL METHOD BLD: ABNORMAL
EOSINOPHIL # BLD AUTO: 0.5 K/UL (ref 0–0.5)
EOSINOPHIL NFR BLD: 7.2 % (ref 0–8)
ERYTHROCYTE [DISTWIDTH] IN BLOOD BY AUTOMATED COUNT: 14.5 % (ref 11.5–14.5)
HCT VFR BLD AUTO: 30.7 % (ref 37–48.5)
HGB BLD-MCNC: 9.7 G/DL (ref 12–16)
IMM GRANULOCYTES # BLD AUTO: 0.03 K/UL (ref 0–0.04)
IMM GRANULOCYTES NFR BLD AUTO: 0.4 % (ref 0–0.5)
LYMPHOCYTES # BLD AUTO: 1.1 K/UL (ref 1–4.8)
LYMPHOCYTES NFR BLD: 16.8 % (ref 18–48)
MAGNESIUM SERPL-MCNC: 1.9 MG/DL (ref 1.6–2.6)
MCH RBC QN AUTO: 29.4 PG (ref 27–31)
MCHC RBC AUTO-ENTMCNC: 31.6 G/DL (ref 32–36)
MCV RBC AUTO: 93 FL (ref 82–98)
MONOCYTES # BLD AUTO: 0.7 K/UL (ref 0.3–1)
MONOCYTES NFR BLD: 10.2 % (ref 4–15)
NEUTROPHILS # BLD AUTO: 4.4 K/UL (ref 1.8–7.7)
NEUTROPHILS NFR BLD: 65 % (ref 38–73)
NRBC BLD-RTO: 0 /100 WBC
PLATELET # BLD AUTO: 263 K/UL (ref 150–450)
PMV BLD AUTO: 9.3 FL (ref 9.2–12.9)
RBC # BLD AUTO: 3.3 M/UL (ref 4–5.4)
TSH SERPL DL<=0.005 MIU/L-ACNC: 0.64 UIU/ML (ref 0.34–5.6)
WBC # BLD AUTO: 6.79 K/UL (ref 3.9–12.7)

## 2023-11-05 PROCEDURE — 94761 N-INVAS EAR/PLS OXIMETRY MLT: CPT

## 2023-11-05 PROCEDURE — 25000003 PHARM REV CODE 250: Performed by: THORACIC SURGERY (CARDIOTHORACIC VASCULAR SURGERY)

## 2023-11-05 PROCEDURE — 99900035 HC TECH TIME PER 15 MIN (STAT)

## 2023-11-05 PROCEDURE — 85025 COMPLETE CBC W/AUTO DIFF WBC: CPT | Performed by: THORACIC SURGERY (CARDIOTHORACIC VASCULAR SURGERY)

## 2023-11-05 PROCEDURE — 83735 ASSAY OF MAGNESIUM: CPT | Performed by: THORACIC SURGERY (CARDIOTHORACIC VASCULAR SURGERY)

## 2023-11-05 PROCEDURE — 25000003 PHARM REV CODE 250

## 2023-11-05 PROCEDURE — 99231 SBSQ HOSP IP/OBS SF/LOW 25: CPT | Mod: ,,, | Performed by: INTERNAL MEDICINE

## 2023-11-05 PROCEDURE — 84443 ASSAY THYROID STIM HORMONE: CPT | Performed by: INTERNAL MEDICINE

## 2023-11-05 RX ORDER — LANOLIN ALCOHOL/MO/W.PET/CERES
400 CREAM (GRAM) TOPICAL DAILY
Qty: 14 TABLET | Refills: 0
Start: 2023-11-06 | End: 2023-11-20

## 2023-11-05 RX ORDER — POTASSIUM CHLORIDE 20 MEQ/1
20 TABLET, EXTENDED RELEASE ORAL DAILY
Qty: 14 TABLET | Refills: 0
Start: 2023-11-05 | End: 2023-11-19

## 2023-11-05 RX ORDER — FAMOTIDINE 20 MG/1
20 TABLET, FILM COATED ORAL DAILY
Status: DISCONTINUED | OUTPATIENT
Start: 2023-11-06 | End: 2023-11-05 | Stop reason: HOSPADM

## 2023-11-05 RX ORDER — GUAIFENESIN 600 MG/1
600 TABLET, EXTENDED RELEASE ORAL 2 TIMES DAILY
Qty: 30 TABLET | Refills: 0
Start: 2023-11-05 | End: 2023-11-20

## 2023-11-05 RX ORDER — HYDROCODONE BITARTRATE AND ACETAMINOPHEN 5; 325 MG/1; MG/1
1 TABLET ORAL EVERY 6 HOURS PRN
Qty: 28 TABLET | Refills: 0 | Status: ON HOLD
Start: 2023-11-05 | End: 2023-12-06 | Stop reason: HOSPADM

## 2023-11-05 RX ORDER — AMIODARONE HYDROCHLORIDE 200 MG/1
200 TABLET ORAL 2 TIMES DAILY
Qty: 60 TABLET | Refills: 11 | Status: SHIPPED | OUTPATIENT
Start: 2023-11-05 | End: 2024-11-04

## 2023-11-05 RX ORDER — FAMOTIDINE 20 MG/1
20 TABLET, FILM COATED ORAL DAILY
Qty: 30 TABLET | Refills: 0
Start: 2023-11-06 | End: 2023-12-20

## 2023-11-05 RX ADMIN — POTASSIUM CHLORIDE 20 MEQ: 1500 TABLET, EXTENDED RELEASE ORAL at 08:11

## 2023-11-05 RX ADMIN — Medication 400 MG: at 08:11

## 2023-11-05 RX ADMIN — CLOPIDOGREL BISULFATE 75 MG: 75 TABLET, FILM COATED ORAL at 08:11

## 2023-11-05 RX ADMIN — GUAIFENESIN 600 MG: 600 TABLET, EXTENDED RELEASE ORAL at 08:11

## 2023-11-05 RX ADMIN — METFORMIN HYDROCHLORIDE 500 MG: 500 TABLET ORAL at 08:11

## 2023-11-05 RX ADMIN — AMIODARONE HYDROCHLORIDE 200 MG: 200 TABLET ORAL at 08:11

## 2023-11-05 RX ADMIN — METOPROLOL SUCCINATE 25 MG: 25 TABLET, EXTENDED RELEASE ORAL at 08:11

## 2023-11-05 RX ADMIN — PANTOPRAZOLE SODIUM 40 MG: 40 TABLET, DELAYED RELEASE ORAL at 05:11

## 2023-11-05 RX ADMIN — GABAPENTIN 100 MG: 100 CAPSULE ORAL at 08:11

## 2023-11-05 NOTE — DISCHARGE INSTRUCTIONS
No driving for one month.  Note lifting heavy object with right arm for one month.  Call Dr. Shankar's office for any fevers, chills, shortness of breath, redness or drainage from wounds.  Ambulate periodically.  Leave wound dressing in place.  Ok to take a brief shower.  No caffeine.  Obtain chest x-ray on November 15, 2023 at [facility].  Stopped taking Zocor until further notice.

## 2023-11-05 NOTE — PROGRESS NOTES
Pulmonary/Critical Care Progress Note      PATIENT NAME: Alexa Otero  MRN: [id number]  TODAY'S DATE: 2023  12:53 PM  ADMIT DATE: 10/31/2023  AGE: 83 y.o. : 1940      HPI:  The patient is an 83-year-old female who has been coughing and producing purulent mucus for the last year.  She underwent 2 bronchoscopies which both came back with debris.  She did have a PET scan which had some hypermetabolism in the edges of the right lower lobe and an inferior hilar node that was PET warm as well.  The patient has a remote history of endometrioid adenocarcinoma.  Prior to this surgery, the patient has had to have coronary artery stents placed.  Today she was brought to the OR in a right lower lobectomy was done.     the patient had a fairly uneventful night with the exception of pain control.     the patient is awake.  She is having some right lower anterior thoracic pain.  Her thoracostomy tube drainage output is decreasing.  Her pathology is still pending.    11/3 the patient is doing well.  She still has her thoracostomy tube which is continuing to drain serosanguineous fluid.  Her pathology is not yet settled.  Dr. Valentin is sending it out to another reviewer.    - her pain is controlled. R chest tube in place w/ minimal serosanguinous output. Path is pending    - stable on RA, chest tube has been discontinued. She has small apical pneumothorax on CXR, asymptomatic    REVIEW OF SYSTEMS  GENERAL: Feeling good  EYES: Vision is good.  ENT: No sinusitis or pharyngitis.   HEART: No chest pain or palpitations.  LUNGS:  She is having less pain.  GI: No Nausea, vomiting, constipation, diarrhea, or reflux.  : No dysuria, hesitancy, or nocturia.  SKIN: No lesions or rashes.  MUSCULOSKELETAL: No joint pain or myalgias.  NEURO: No headaches or neuropathy.  LYMPH: No edema or adenopathy.  PSYCH: No anxiety or depression.  ENDO: No weight change.    No change in the patient's Past Medical History, Past  Surgical History, Social History or Family History since admission.      VITAL SIGNS (MOST RECENT)  Temp: 97.7 °F (36.5 °C) (11/05/23 0930)  Pulse: 79 (11/05/23 0930)  Resp: 17 (11/05/23 0930)  BP: (!) 111/58 (11/05/23 0930)  SpO2: 96 % (11/05/23 1100)    INTAKE AND OUTPUT (LAST 24 HOURS):No intake or output data in the 24 hours ending 11/05/23 1203      WEIGHT  Wt Readings from Last 1 Encounters:   11/01/23 57.6 kg (126 lb 15.8 oz)       PHYSICAL EXAM  GENERAL: Older patient in no distress.   HEENT: Pupils equal and reactive. Extraocular movements intact. Nose intact. Pharynx moist.  NECK: Supple.   HEART: Regular rate and rhythm. No murmur or gallop auscultated.  LUNGS:  There crackles in the posterior left lower lobe.  On the right there is a pleural rub.  Lung excursion symmetrical.   Thoracostomy tube:  Serosanguineous drainage.  No leak seen.  ABDOMEN: Bowel sounds present. Non-tender, no masses palpated.  : Normal anatomy.    EXTREMITIES: Normal muscle tone and joint movement, no cyanosis or clubbing.   LYMPHATICS: No adenopathy palpated, no edema.  SKIN: Dry, intact, no lesions.   NEURO: Cranial nerves II-XII intact. Motor strength 5/5 bilaterally, upper and lower extremities.  Patient is ambulating to the bathroom.  PSYCH: Appropriate affect.      CBC LAST (LAST 24 HOURS)  Recent Labs   Lab 11/05/23  0503   WBC 6.79   RBC 3.30*   HGB 9.7*   HCT 30.7*   MCV 93   MCH 29.4   MCHC 31.6*   RDW 14.5      MPV 9.3   GRAN 65.0  4.4   LYMPH 16.8*  1.1   MONO 10.2  0.7   BASO 0.03   NRBC 0         CHEMISTRY LAST (LAST 24 HOURS)  Recent Labs   Lab 11/05/23  0503   MG 1.9         LAST 7 DAYS MICROBIOLOGY   Microbiology Results (last 7 days)       Procedure Component Value Units Date/Time    Tissue culture [[id number]] Collected: 10/31/23 1127    Order Status: Completed Specimen: Tissue Updated: 11/04/23 1005     Aerobic Culture - Tissue No growth     Gram Stain Result Rare WBC's      No organisms seen     Narrative:      Right lower lung tissue culture    AFB Culture & Smear [[id number]] Collected: 10/31/23 1127    Order Status: Completed Specimen: Tissue from Lung, Right Updated: 11/03/23 0749     AFB CULTURE STAIN No acid fast bacilli seen.     AFB CULTURE STAIN Testing performed by:     AFB CULTURE STAIN Lab [facility] [place]     AFB CULTURE STAIN [address] [facility]     [place] [address]     AFB CULTURE STAIN Dr.Brian Filiberto MD    Narrative:      Right lower lung tissue culture    Culture, Anaerobe [[id number]] Collected: 10/31/23 1127    Order Status: Completed Specimen: Tissue from Lung, Right Updated: 11/02/23 1506     Anaerobic Culture No anaerobes isolated    Narrative:      Right lower lung tissue culture    Fungus culture [[id number]] Collected: 10/31/23 1127    Order Status: Sent Specimen: Biopsy from Lung, Right Updated: 10/31/23 1216    Aerobic culture [[id number]] Collected: 10/31/23 1127    Order Status: Canceled Specimen: Tissue from Lung, Right     Gram stain [[id number]] Collected: 10/31/23 1127    Order Status: Canceled Specimen: Biopsy from Lung, Right             MOST RECENT IMAGING  X-Ray Chest PA And Lateral  CLINICAL HISTORY:  83 years (1940) Female Status post Right Lung Lower Lobectomy    TECHNIQUE:  Portable AP radiograph the chest. One view.    COMPARISON:  Radiograph November 3, 2023.    FINDINGS:  Small left-sided pneumothorax, slightly worse from the previous exam. Patchy airspace opacity at the right lung base and mild diffuse interstitial opacity throughout the right lung are essentially unchanged when accounting for differences in technique. The left lung is essentially clear (noting an overlying calcified breast implant)  The heart is normal in size. There is a right-sided subclavian medical infusion port with tip at the level of the SVC. Osseous structures appear unchanged. The visualized upper abdomen is unremarkable.    Lines and tubes: Interval  removal of the right-sided chest tubes.    IMPRESSION:  1. Interval removal of the right-sided chest tubes.  2. Small right apical and subpulmonic pneumothorax, slightly worse from the previous exam.  3. Fixed interstitial and alveolar opacities in the right lung, minimally changed from the previous exam.    RESULT NOTIFICATION: These observations were flagged for review, to be communicated to the ordering provider team/RN to be communicated to  MEHDI ECHAVARRIA at 11/5/2023 9:19 AM CST.    Electronically signed by:  Anton Medina MD  11/05/2023 09:20 AM CST Workstation: [id number]      CURRENT VISIT EKG  No results found for this visit on 10/31/23.    ECHOCARDIOGRAM RESULTS  Results for orders placed during the hospital encounter of 10/13/23    Echo    Interpretation Summary    Left Ventricle: The left ventricle is normal in size. Normal wall thickness. Normal wall motion. There is normal systolic function with a visually estimated ejection fraction of 55 - 60%. Grade I diastolic dysfunction.    Right Ventricle: Normal right ventricular cavity size. Wall thickness is normal. Right ventricle wall motion  is normal. Systolic function is normal.    Aortic Valve: There is moderate aortic valve sclerosis.    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 There is mild mitral annular calcification present. There is no stenosis. The mean pressure gradient across the mitral valve is 3 mmHg at a heart rate of  bpm. There is mild regurgitation.    Tricuspid Valve: There is mild regurgitation.  No pulmonary hypertension.    IVC/SVC: Normal venous pressure at 3 mmHg.      The patient is on room air         LAST ARTERIAL BLOOD GAS  ABG  Recent Labs   Lab 10/31/23  0947   PH 7.335*   PO2 152*   PCO2 45.9*   HCO3 24.5   BE -1         IMPRESSION AND PLAN  Destroyed right lower lobe presumably from chronic aspiration  Remote history of endometrioid adenocarcinoma  - cultures and pathology sent     Status post  right lower lobectomy  - pain control better  - chest tube discontinued per surgeon  - tiny apical ptx today but clinically stable and asymptomatic    Coronary artery disease with recent stent  - will resume antiplatelet therapy today    Hypomagnesemia  - a little low today  - electrolyte sliding scale in place    Hypokalemia  - replace an trend  Anemia  - stable  - postop  Mild hypoalbuminemia    Sudha Nuñez MD  Date of Service: 11/05/2023  12:53 PM

## 2023-11-05 NOTE — HOSPITAL COURSE
The patient is an 83-year-old diabetic female former smoker who was referred to the Thoracic Surgery Service, by her Pulmonologist Dr. Neva Christian, for evaluation of a hypermetabolic right lung lower lobe infiltrate or mass.  Recent bronchoscopy revealed necrotic cells.  Fungal and AFB cultures were negative. The patient reports a 14 month duration of cough with clear to whitish phlegm, mostly occurring while lying down at night.  She has a history of Stage Ic Ovarian Carcinoma.  She has a history of hypercholesteremia.  On CT scan of the chest, there were obvious coronary calcifications which prompted a pre-operative Cardiology evaluation.  She is recently status post multivessel coronary artery stenting at [facility].  This was complicated by a a cardiac arrest in the postprocedure area.  She underwent immediate resuscitation and repeat coronary angiography revealing satisfactory coronary status.  Subsequent evaluation at [facility] revealed she had severe hypomagnesia and hypokalemia.  She was treated and discharged home in good condition.  Preoperative pulmonary function studies revealed adequate lung function to tolerate a right lung lower lobectomy.         On October 31, 2023, the patient was admitted to [facility] underwent right video-assisted thorascopic evaluation.  The right lung lower lobe was found to be extremely firm and not amenable to wedge biopsy, however there were no visible contraindications to proceeding with right lung lower lobectomy.  The patient underwent a right lung lower lobectomy via thoracotomy in the same setting.  She tolerated the procedure well & was extubated at the end of the operation.  She has had a progressive recovery, punctuated by acute anemia secondary to the expected blood loss of surgery and an episode of paroxysmal atrial fibrillation which was converted to sinus rhythm with medical therapy.  The Cardiology Service assisted in  managing her atrial fibrillation with the addition of amiodarone.  Her hemoglobin level remained above transfusion threshold.  On POD #4, her chest tubes were removed.  By POD #5, her pulse ox saturations on room air at rest are 98% and she is clinically ready for discharge to home.  At time of discharge, cultures of her right lung lower lobe remain negative, and final surgical pathology result is pending.

## 2023-11-05 NOTE — CARE UPDATE
11/05/23 0706   PRE-TX-O2   Device (Oxygen Therapy) room air   SpO2 96 %   Pulse Oximetry Type Continuous   $ Pulse Oximetry - Multiple Charge Pulse Oximetry - Multiple   Pulse 79   Resp 15   Respiratory Evaluation   $ Care Plan Tech Time 15 min

## 2023-11-05 NOTE — PLAN OF CARE
Patient cleared for discharge from case management standpoint.    Follow up appointment instructions on AVS.    Chart and discharge orders reviewed.  Patient discharged home with no further case management needs.      11/05/23 1249   Final Note   Assessment Type Final Discharge Note   Anticipated Discharge Disposition Home   Post-Acute Status   Discharge Delays None known at this time

## 2023-11-05 NOTE — DISCHARGE SUMMARY
[facility]  Cardiothoracic Surgery  Discharge Summary      Patient Name: Alexa Otero  MRN: [id number]  Admission Date: 10/31/2023  Hospital Length of Stay: 5 days  Discharge Date and Time:  11/05/2023 12:48 PM  Attending Physician: Washington Shankar MD   Discharging Provider: Washington Shankar MD  Primary Care Provider: Idalia Greco MD    HPI:   No notes on file    Procedure(s) (LRB):  VATS, WITH PLEURA BIOPSY (Right)  THORACOTOMY (Right)  INTERCOSTAL NERVE BLOCK (Right)      Indwelling Lines/Drains at time of discharge:   Lines/Drains/Airways     Central Venous Catheter Line  Duration           Port A Cath Single Lumen Subclavian Right -- days              Hospital Course:          The patient is an 83-year-old diabetic female former smoker who was referred to the Thoracic Surgery Service, by her Pulmonologist Dr. Neva Christian, for evaluation of a hypermetabolic right lung lower lobe infiltrate or mass.  Recent bronchoscopy revealed necrotic cells.  Fungal and AFB cultures were negative. The patient reports a 14 month duration of cough with clear to whitish phlegm, mostly occurring while lying down at night.  She has a history of Stage Ic Ovarian Carcinoma.  She has a history of hypercholesteremia.  On CT scan of the chest, there were obvious coronary calcifications which prompted a pre-operative Cardiology evaluation.  She is recently status post multivessel coronary artery stenting at [facility].  This was complicated by a a cardiac arrest in the postprocedure area.  She underwent immediate resuscitation and repeat coronary angiography revealing satisfactory coronary status.  Subsequent evaluation at [facility] revealed she had severe hypomagnesia and hypokalemia.  She was treated and discharged home in good condition.  Preoperative pulmonary function studies revealed adequate lung function to tolerate a right lung lower lobectomy.         On October 31, 2023, the patient was  [facility] underwent right video-assisted thorascopic evaluation.  The right lung lower lobe was found to be extremely firm and not amenable to wedge biopsy, however there were no visible contraindications to proceeding with right lung lower lobectomy.  The patient underwent a right lung lower lobectomy via thoracotomy in the same setting.  She tolerated the procedure well & was extubated at the end of the operation.  She has had a progressive recovery, punctuated by acute anemia secondary to the expected blood loss of surgery and an episode of paroxysmal atrial fibrillation which was converted to sinus rhythm with medical therapy.  The Cardiology Service assisted in managing her atrial fibrillation with the addition of amiodarone.  Her hemoglobin level remained above transfusion threshold.  On POD #4, her chest tubes were removed.  By POD #5, her pulse ox saturations on room air at rest are 98% and she is clinically ready for discharge to home.  At time of discharge, cultures of her right lung lower lobe remain negative, and final surgical pathology result is pending.      Goals of Care Treatment Preferences:  Code Status: Full Code    Living Will: Yes              Consults (From admission, onward)        Status Ordering Provider     Inpatient consult to Cardiology  Once        Provider:  (Not yet assigned)    Completed MEHDI ECHAVARRIA          Significant Diagnostic Studies: Labs:   BMP:   Recent Labs   Lab 11/04/23  0445 11/05/23  0503   MG 1.7 1.9       Pending Diagnostic Studies:     Procedure Component Value Units Date/Time    Specimen to Pathology - Surgery [[id number]] Collected: 10/31/23 1127    Order Status: Sent Lab Status: No result     Specimen: Tissue           No new Assessment & Plan notes have been filed under this hospital service since the last note was generated.  Service: Cardiothoracic Surgery    Final Active Diagnoses:    Diagnosis Date Noted POA    PRINCIPAL  PROBLEM:  Lung infiltrate [R91.8] 11/05/2023 Yes    Paroxysmal atrial fibrillation [I48.0] 11/05/2023 No    Status post lobectomy of lung [Z90.2] 11/05/2023 Not Applicable      Problems Resolved During this Admission:      Discharged Condition: good    Disposition: Home or Self Care    Follow Up:   Follow-up Information     Washington Shankar MD Follow up on 11/16/2023.    Specialty: Cardiothoracic Surgery  Why: Call [phone] to schedule a follow up appointment with Dr. Shankar for November 16, 2023  Contact information:  [id number]   [address]  [place] [address]  [phone]                       Patient Instructions:   No discharge procedures on file.  Medications:  Reconciled Home Medications:      Medication List      START taking these medications    amiodarone 200 MG Tab  Commonly known as: PACERONE  Take 1 tablet (200 mg total) by mouth 2 (two) times daily.     famotidine 20 MG tablet  Commonly known as: PEPCID  Take 1 tablet (20 mg total) by mouth once daily. for 30 doses  Start taking on: November 6, 2023     guaiFENesin 600 mg 12 hr tablet  Commonly known as: MUCINEX  Take 1 tablet (600 mg total) by mouth 2 (two) times daily. for 30 doses     HYDROcodone-acetaminophen 5-325 mg per tablet  Commonly known as: NORCO  Take 1 tablet by mouth every 6 (six) hours as needed for Pain.     magnesium oxide 400 mg (241.3 mg magnesium) tablet  Commonly known as: MAG-OX  Take 1 tablet (400 mg total) by mouth once daily. for 14 doses  Start taking on: November 6, 2023     potassium chloride SA 20 MEQ tablet  Commonly known as: K-DUR,KLOR-CON  Take 1 tablet (20 mEq total) by mouth once daily. for 14 doses        CHANGE how you take these medications    metFORMIN 500 MG tablet  Commonly known as: GLUCOPHAGE  TAKE 1 TABLET(500 MG) BY MOUTH TWICE DAILY WITH MEALS  What changed: when to take this        CONTINUE taking these medications    aspirin 81 MG EC tablet  Commonly known as: ECOTRIN  Take 81 mg by mouth once  daily.     clopidogreL 75 mg tablet  Commonly known as: PLAVIX  Take 75 mg by mouth once daily.     gabapentin 100 MG capsule  Commonly known as: NEURONTIN  TAKE 1 CAPSULE(100 MG) BY MOUTH TWICE DAILY     ICAPS AREDS ORAL  Take 1 capsule by mouth 2 (two) times a day.     melatonin 5 mg Chew  Take 2 tablets by mouth nightly as needed (insomnia).     metoprolol succinate 25 MG 24 hr tablet  Commonly known as: TOPROL-XL  Take 25 mg by mouth once daily.     simvastatin 40 MG tablet  Commonly known as: ZOCOR  Take 1 tablet (40 mg total) by mouth every evening.        STOP taking these medications    benazepriL 40 MG tablet  Commonly known as: LOTENSIN     benzonatate 100 MG capsule  Commonly known as: TESSALON     chlorthalidone 25 MG Tab  Commonly known as: HYGROTEN     omeprazole 40 MG capsule  Commonly known as: PRILOSEC     traZODone 50 MG tablet  Commonly known as: DESYREL          Time spent on the discharge of patient: 20 minutes    Washington Shankar MD  Cardiothoracic Surgery  [facility]

## 2023-11-05 NOTE — NURSING
"Pt drank her Ensure with her breakfast and then we took a walk around the unit. Pt made two laps and did well with no SOB, tachycardia, or lightheadedness. However, upon returning to pts room she got nauseated and threw up approximately 300ml of what appeared to be the Ensure she had with breakfast. Pt did get quite tachycardic into the 130s, but this quickly was corrected after pt settled down and  had a SL Zofran. There did not appear to be any medication in emesis. Pt opted ot lay back down in bed with HOB elevated at this time. She is not currently nauseated and VSS.  at bedside.     0928-pt noted to be in 's on monitor. Pt lying in bed stated she "feels funny". Attempted to vagal but SVT did not break. MD notified via telephone STAT. New orders for Cardizem 10mg OVER TEN MINUTES and then a Cardizem gtt @ 10mg. HR came down into 120-130 ST after 10mg dose. Cardizem gtt started immediately after. HR currently in 120-130s. Pt without any symptoms currently. BP normal.     1011-AFIB RVR confirmed via EKG, rates continue in 120-140 range. Dr. Shankar notified via telephone. Cardiology consult ordered    1345-Cardiology saw pt earlier and pt received Digoxin and Amio gtt plus a 250ml NS Bolus. She also received 20meq of IV KCL since she likely threw up the PO dose this AM. Pt has since converted back to SR with rates sustaining in the 90s. Pt has had no more nausea/vomiting. She is c/o increased discomfort beneath her right breast that is worse on inspiration. She is coughing up pink colored phlegm. Dr. Christian was made aware of this. Her chest tube output so far this shift has been 50ml, still serosangineous.   "
..Nurses Note -- 4 Eyes      11/2/2023   13:21 PM      Skin assessed during: Transfer      [] No Altered Skin Integrity Present    []Prevention Measures Documented      [x] Yes- Altered Skin Integrity Present or Discovered   [] LDA Added if Not in Epic (Describe Wound)   [] New Altered Skin Integrity was Present on Admit and Documented in LDA   [] Wound Image Taken    Wound Care Consulted? No    Attending Nurse:  Liz Jackson RN/Staff Member:   RODO Prajapati         
Chest xray is worse from previos scan called [facility] and left call back number also secured mimi rasmussen to see if she was covering his patients  
Nurses Note -- 4 Eyes      10/31/2023   6:34 PM      Skin assessed during: Admit      [x] No Altered Skin Integrity Present    []Prevention Measures Documented      [] Yes- Altered Skin Integrity Present or Discovered   [] LDA Added if Not in Epic (Describe Wound)   [] New Altered Skin Integrity was Present on Admit and Documented in LDA   [] Wound Image Taken    Wound Care Consulted? No    Attending Nurse:  Parker Jackson RN/Staff Member:  Lenora KOEHLER          
Nurses Note -- 4 Eyes      11/4/2023   5:40 AM      Skin assessed during: Admit      [] No Altered Skin Integrity Present    []Prevention Measures Documented      [x] Yes- Altered Skin Integrity Present or Discovered   [x] LDA Added if Not in Epic (Describe Wound)   [x] New Altered Skin Integrity was Present on Admit and Documented in LDA   [x] Wound Image Taken    Wound Care Consulted? Yes    Attending Nurse:  Cl Jackson RN/Staff Member:   Kiley Isabel          
1-2 cups/cans per day

## 2023-11-06 ENCOUNTER — PATIENT OUTREACH (OUTPATIENT)
Dept: ADMINISTRATIVE | Facility: CLINIC | Age: 83
End: 2023-11-06
Payer: MEDICARE

## 2023-11-06 NOTE — PROGRESS NOTES
C3 nurse spoke with Alexa Otero  for a TCC post hospital discharge follow up call. The patient has a scheduled HOSFU appointment with Vandana Melendrez on 11/7/23. Informed Ms. Liu the provider will call to provide appointment time.

## 2023-11-07 ENCOUNTER — OFFICE VISIT (OUTPATIENT)
Dept: HOME HEALTH SERVICES | Facility: CLINIC | Age: 83
End: 2023-11-07
Payer: MEDICARE

## 2023-11-07 DIAGNOSIS — Z90.2 STATUS POST LOBECTOMY OF LUNG: Primary | ICD-10-CM

## 2023-11-07 DIAGNOSIS — R91.8 LUNG INFILTRATE: ICD-10-CM

## 2023-11-07 PROCEDURE — 3288F PR FALLS RISK ASSESSMENT DOCUMENTED: ICD-10-PCS | Mod: CPTII,S$GLB,, | Performed by: NURSE PRACTITIONER

## 2023-11-07 PROCEDURE — 1159F MED LIST DOCD IN RCRD: CPT | Mod: CPTII,S$GLB,, | Performed by: NURSE PRACTITIONER

## 2023-11-07 PROCEDURE — 99496 TRANSJ CARE MGMT HIGH F2F 7D: CPT | Mod: S$GLB,,, | Performed by: NURSE PRACTITIONER

## 2023-11-07 PROCEDURE — 1157F PR ADVANCE CARE PLAN OR EQUIV PRESENT IN MEDICAL RECORD: ICD-10-PCS | Mod: CPTII,S$GLB,, | Performed by: NURSE PRACTITIONER

## 2023-11-07 PROCEDURE — 1126F AMNT PAIN NOTED NONE PRSNT: CPT | Mod: CPTII,S$GLB,, | Performed by: NURSE PRACTITIONER

## 2023-11-07 PROCEDURE — 1160F PR REVIEW ALL MEDS BY PRESCRIBER/CLIN PHARMACIST DOCUMENTED: ICD-10-PCS | Mod: CPTII,S$GLB,, | Performed by: NURSE PRACTITIONER

## 2023-11-07 PROCEDURE — 1157F ADVNC CARE PLAN IN RCRD: CPT | Mod: CPTII,S$GLB,, | Performed by: NURSE PRACTITIONER

## 2023-11-07 PROCEDURE — 1111F PR DISCHARGE MEDS RECONCILED W/ CURRENT OUTPATIENT MED LIST: ICD-10-PCS | Mod: CPTII,S$GLB,, | Performed by: NURSE PRACTITIONER

## 2023-11-07 PROCEDURE — 1159F PR MEDICATION LIST DOCUMENTED IN MEDICAL RECORD: ICD-10-PCS | Mod: CPTII,S$GLB,, | Performed by: NURSE PRACTITIONER

## 2023-11-07 PROCEDURE — 1111F DSCHRG MED/CURRENT MED MERGE: CPT | Mod: CPTII,S$GLB,, | Performed by: NURSE PRACTITIONER

## 2023-11-07 PROCEDURE — 1101F PT FALLS ASSESS-DOCD LE1/YR: CPT | Mod: CPTII,S$GLB,, | Performed by: NURSE PRACTITIONER

## 2023-11-07 PROCEDURE — 99496 TRANSITIONAL CARE MANAGE SERVICE 7 DAY DISCHARGE: ICD-10-PCS | Mod: S$GLB,,, | Performed by: NURSE PRACTITIONER

## 2023-11-07 PROCEDURE — 3075F SYST BP GE 130 - 139MM HG: CPT | Mod: CPTII,S$GLB,, | Performed by: NURSE PRACTITIONER

## 2023-11-07 PROCEDURE — 3288F FALL RISK ASSESSMENT DOCD: CPT | Mod: CPTII,S$GLB,, | Performed by: NURSE PRACTITIONER

## 2023-11-07 PROCEDURE — 1126F PR PAIN SEVERITY QUANTIFIED, NO PAIN PRESENT: ICD-10-PCS | Mod: CPTII,S$GLB,, | Performed by: NURSE PRACTITIONER

## 2023-11-07 PROCEDURE — 1101F PR PT FALLS ASSESS DOC 0-1 FALLS W/OUT INJ PAST YR: ICD-10-PCS | Mod: CPTII,S$GLB,, | Performed by: NURSE PRACTITIONER

## 2023-11-07 PROCEDURE — 3078F DIAST BP <80 MM HG: CPT | Mod: CPTII,S$GLB,, | Performed by: NURSE PRACTITIONER

## 2023-11-07 PROCEDURE — 3078F PR MOST RECENT DIASTOLIC BLOOD PRESSURE < 80 MM HG: ICD-10-PCS | Mod: CPTII,S$GLB,, | Performed by: NURSE PRACTITIONER

## 2023-11-07 PROCEDURE — 1160F RVW MEDS BY RX/DR IN RCRD: CPT | Mod: CPTII,S$GLB,, | Performed by: NURSE PRACTITIONER

## 2023-11-07 PROCEDURE — 3075F PR MOST RECENT SYSTOLIC BLOOD PRESS GE 130-139MM HG: ICD-10-PCS | Mod: CPTII,S$GLB,, | Performed by: NURSE PRACTITIONER

## 2023-11-08 VITALS
WEIGHT: 120 LBS | HEART RATE: 81 BPM | SYSTOLIC BLOOD PRESSURE: 138 MMHG | RESPIRATION RATE: 20 BRPM | DIASTOLIC BLOOD PRESSURE: 72 MMHG | BODY MASS INDEX: 21.26 KG/M2 | OXYGEN SATURATION: 98 % | TEMPERATURE: 98 F

## 2023-11-08 NOTE — PATIENT INSTRUCTIONS
Instructions:  - Continue all medications, treatments and therapies as ordered.   - Follow all instructions, recommendations as discussed.  - Maintain Safety Precautions at all times.  - Attend all medical appointments as scheduled.  - For worsening symptoms: call Primary Care Physician or Nurse Practitioner.  - For emergencies, call 911 or immediately report to the nearest emergency room.

## 2023-11-08 NOTE — PROGRESS NOTES
[facility]  Transitional Care Management (TCM) Home Visit    Encounter Provider: Vandana Melendrez   PCP: Idalia Greco MD  Consult Requested By: No ref. provider found  Admit Date: 10/31/23   IP Discharge Date: 11/5/23  Hospital Length of Stay:RRHLOS@ 5 days  Days since discharge (from IP or SNF): 2  [facility] Contact Note: 11/6/23  Hospital Diagnosis: No admission diagnoses are documented for this encounter.     HISTORY OF PRESENT ILLNESS      Patient ID: Alexa Otero is a 83 y.o. female was recently admitted to the hospital, this is their TCM encounter.    Hospital Course Synopsis:  Admit: 10/31/23   Discharge: 11/5/23    Procedure(s) (LRB):  VATS, WITH PLEURA BIOPSY (Right)  THORACOTOMY (Right)  INTERCOSTAL NERVE BLOCK (Right)     The patient is an 83-year-old diabetic female former smoker with history of Stage 1c Ovarian Carcinoma, hypercholesterolemia, who was referred to the Thoracic Surgery Service, by her Pulmonologist Dr. Neva Christian, for evaluation of a hypermetabolic right lung lower lobe infiltrate or mass.  Recent bronchoscopy revealed necrotic cells.  Fungal and AFB cultures were negative. She is recently status post multivessel coronary artery stenting at [facility].  This was complicated by a a cardiac arrest in the postprocedure area. She underwent immediate resuscitation and repeat coronary angiography revealing satisfactory coronary status.   On October 31, 2023, the patient was admitted to [facility] underwent right video-assisted thorascopic evaluation. The patient underwent a right lung lower lobectomy via thoracotomy. She has had a progressive recovery, punctuated by acute anemia secondary to the expected blood loss of surgery and an episode of paroxysmal atrial fibrillation which was converted to sinus rhythm with medical therapy.  The Cardiology Service assisted in managing her atrial fibrillation with the addition of amiodarone.    With this hospital follow up  "home NP visit patient is found ambulating around her home unassisted. She reports some right upper chest wall/flank pain near her surgical incision that hurts with movement mainly. States has taken "a few hydrocodone" since being home from hospital but has taken tylenol as well which does relieve the pain. Reports some mild dyspnea with walking around her home but it resolves quickly. She states she gets up and walks around her home once an hour to help prevent "complications:.     Vital signs stable during visit. No distress noted. [facility] contact information provided to patient and left in home.    DECISION MAKING TODAY       Assessment & Plan:  1. Status post lobectomy of lung  Overview:  10/31/23 s/p right lobectomy-Dr. Shankar.    Assessment & Plan:  Stable.  Surgical dressing still in place-to be changed tomorrow by family.  S/s infection education to patient.  Pain is managed mainly with tylenol and occasionally hydrocodone.  To follow up with Dr. Shankar 11/16/23.      2. Lung infiltrate  Assessment & Plan:  S/p right lobectomy-pathology pending.  Followed by Dr. Christian.             Medication List on Discharge:     Medication List            Accurate as of November 7, 2023 11:59 PM. If you have any questions, ask your nurse or doctor.                CHANGE how you take these medications      metFORMIN 500 MG tablet  Commonly known as: GLUCOPHAGE  TAKE 1 TABLET(500 MG) BY MOUTH TWICE DAILY WITH MEALS  What changed: when to take this            CONTINUE taking these medications      amiodarone 200 MG Tab  Commonly known as: PACERONE  Take 1 tablet (200 mg total) by mouth 2 (two) times daily.     aspirin 81 MG EC tablet  Commonly known as: ECOTRIN  Take 81 mg by mouth once daily.     clopidogreL 75 mg tablet  Commonly known as: PLAVIX  Take 75 mg by mouth once daily.     famotidine 20 MG tablet  Commonly known as: PEPCID  Take 1 tablet (20 mg total) by mouth once daily. for 30 doses     gabapentin " 100 MG capsule  Commonly known as: NEURONTIN  TAKE 1 CAPSULE(100 MG) BY MOUTH TWICE DAILY     guaiFENesin 600 mg 12 hr tablet  Commonly known as: MUCINEX  Take 1 tablet (600 mg total) by mouth 2 (two) times daily. for 30 doses     HYDROcodone-acetaminophen 5-325 mg per tablet  Commonly known as: NORCO  Take 1 tablet by mouth every 6 (six) hours as needed for Pain.     ICAPS AREDS ORAL  Take 1 capsule by mouth 2 (two) times a day.     magnesium oxide 400 mg (241.3 mg magnesium) tablet  Commonly known as: MAG-OX  Take 1 tablet (400 mg total) by mouth once daily. for 14 doses     melatonin 5 mg Chew  Take 2 tablets by mouth nightly as needed (insomnia).     metoprolol succinate 25 MG 24 hr tablet  Commonly known as: TOPROL-XL  Take 25 mg by mouth once daily.     potassium chloride SA 20 MEQ tablet  Commonly known as: K-DUR,KLOR-CON  Take 1 tablet (20 mEq total) by mouth once daily. for 14 doses              Medication Reconciliation:  Were medications changed on discharge? Yes  Were medications in the home? Yes  Is the patient taking the medications as directed? Yes  Does the patient understand the medications and changes? Yes  Does updated med list accurately reflects meds patient is currently taking? Yes    ENVIRONMENT OF CARE      Family and/or Caregiver present at visit?  No  Name of Caregiver: Joyce, daughter  History provided by: patient    Advance Care Planning   Advanced Care Planning Status:  Patient has had an ACP conversation  Living Will: Yes  Power of : Yes  [place]: No    Does Caregiver have HCPoA: Yes  Changes today: none       Impression upon entering the home:  Physical Dwelling: single family home   Appearance of home environment: cleaniness: clean, walking pathways: clear, lighting: adequate, and home structure: sound structure  Functional Status: independent  Mobility: ambulatory  Nutritional access: adequate intake and access  Home Health: No, and does not need it at this time   DME/Supplies:  rolling walker but not using    Diagnostic tests reviewed/disposition: No diagnosic tests pending after this hospitalization.  Disease/illness education:  s/p lobectomy, infection/surgical complication prevention s/s  Establishment or re-establishment of referral orders for community resources: No other necessary community resources.   Discussion with other health care providers: No discussion with other health care providers necessary.   Does patient have a PCP at [facility]? Yes   Repatriation plan with PCP? follow-up with PCP within 30d   Does patient have an ostomy (ileostomy, colostomy, suprapubic catheter, nephrostomy tube, tracheostomy, PEG tube, pleurex catheter, cholecystostomy, etc)? No  Were BPAs reviewed? Yes    Social History     Socioeconomic History    Marital status:    Tobacco Use    Smoking status: Former     Current packs/day: 0.00     Average packs/day: 1 pack/day for 20.0 years (20.0 ttl pk-yrs)     Types: Cigarettes     Start date:      Quit date:      Years since quittin.8    Smokeless tobacco: Never    Tobacco comments:     quit 40 yrs ago   Substance and Sexual Activity    Alcohol use: Yes     Alcohol/week: 1.0 standard drink of alcohol     Types: 1 Glasses of wine per week     Comment: occasional    Drug use: No    Sexual activity: Not Currently     Partners: Male     Social Determinants of Health     Financial Resource Strain: Low Risk  (2023)    Overall Financial Resource Strain (CARDIA)     Difficulty of Paying Living Expenses: Not hard at all   Food Insecurity: No Food Insecurity (2023)    Hunger Vital Sign     Worried About Running Out of Food in the Last Year: Never true     Ran Out of Food in the Last Year: Never true   Transportation Needs: No Transportation Needs (2023)    PRAPARE - Transportation     Lack of Transportation (Medical): No     Lack of Transportation (Non-Medical): No   Physical Activity: Inactive (2023)    Exercise Vital Sign     Days  of Exercise per Week: 0 days     Minutes of Exercise per Session: 0 min   Stress: Stress Concern Present (5/16/2023)    [employer] - Occupational Stress Questionnaire     Feeling of Stress : To some extent   Social Connections: Unknown (5/16/2023)    Social Connection and Isolation Panel [NHANES]     Frequency of Communication with Friends and Family: More than three times a week     Frequency of Social Gatherings with Friends and Family: Three times a week     Active Member of Clubs or Organizations: No     Attends Club or Organization Meetings: Patient refused     Marital Status:    Housing Stability: Low Risk  (5/16/2023)    Housing Stability Vital Sign     Unable to Pay for Housing in the Last Year: No     Number of Places Lived in the Last Year: 1     Unstable Housing in the Last Year: No         OBJECTIVE:     Vital Signs:  Vitals:    11/07/23 1050   BP: 138/72   Pulse: 81   Resp: 20   Temp: 98.4 °F (36.9 °C)       Review of Systems   Constitutional:  Positive for activity change, appetite change and fatigue. Negative for fever.   HENT: Negative.     Eyes: Negative.    Respiratory:  Positive for cough (mild, intermittent, white sputum) and shortness of breath (with exertion).    Cardiovascular: Negative.    Gastrointestinal: Negative.    Endocrine: Negative.    Genitourinary: Negative.    Musculoskeletal: Negative.    Skin: Negative.    Neurological: Negative.    Hematological:  Bruises/bleeds easily.   Psychiatric/Behavioral: Negative.         Physical Exam:  Physical Exam  Constitutional:       Appearance: She is ill-appearing.      Comments: Thin, frail, elderly female   HENT:      Head: Normocephalic and atraumatic.      Right Ear: External ear normal.      Left Ear: External ear normal.      Nose: Nose normal.      Mouth/Throat:      Mouth: Mucous membranes are dry.      Pharynx: Oropharynx is clear.   Eyes:      Extraocular Movements: Extraocular movements intact.       Pupils: Pupils are equal, round, and reactive to light.   Cardiovascular:      Rate and Rhythm: Normal rate and regular rhythm.      Pulses: Normal pulses.      Heart sounds: Normal heart sounds.      Comments: Right chest wall port a cath not accessed.  Pulmonary:      Comments: Dyspnea on exertion that resolves with rest. Right lung sounds with mild rales otherwise clear.   Abdominal:      General: Abdomen is flat. Bowel sounds are normal.      Palpations: Abdomen is soft.      Tenderness: There is no abdominal tenderness.   Musculoskeletal:         General: Normal range of motion.      Cervical back: Neck supple.   Skin:     General: Skin is warm and dry.      Capillary Refill: Capillary refill takes 2 to 3 seconds.      Findings: Bruising (bilateral arms) present.             Comments: Right chest wall/lateral with surgical dressings CDI.   Neurological:      Mental Status: She is alert and oriented to person, place, and time.      Motor: No weakness.      Gait: Gait normal.   Psychiatric:         Mood and Affect: Mood normal.         Behavior: Behavior normal.         Thought Content: Thought content normal.         Judgment: Judgment normal.       INSTRUCTIONS FOR PATIENT:     Scheduled Follow-up, Appts Reviewed with Modifications if Needed: Yes  Future Appointments   Date Time Provider Department Center   11/13/2023 11:20 AM INJECTION CHAIR, [facility] CHE[facility] [place]   12/5/2023  3:00 PM Neva Christian MD [facility] [id number]   12/13/2023  9:30 AM LAB, [facility] [place]   12/20/2023  3:30 PM Idalia Greco MD [facility] MED [place]   12/26/2023  1:20 PM INJECTION CHAIR, [facility] CHEMO [facility] [place]   4/5/2024  2:15 PM Cheryl Richards MD [facility] DERM [place]       Signature: Vandana Melendrez NP    Transition of Care Visit:  I have reviewed and updated the history and problem list.  I have reconciled the medication list.  I have discussed the hospitalization and current medical  issues, prognosis and plans with the patient/family.

## 2023-11-09 NOTE — ASSESSMENT & PLAN NOTE
Stable.  Surgical dressing still in place-to be changed tomorrow by family.  S/s infection education to patient.  Pain is managed mainly with tylenol and occasionally hydrocodone.  To follow up with Dr. Shankar 11/16/23.

## 2023-11-13 ENCOUNTER — INFUSION (OUTPATIENT)
Dept: INFUSION THERAPY | Facility: HOSPITAL | Age: 83
End: 2023-11-13
Attending: OBSTETRICS & GYNECOLOGY
Payer: MEDICARE

## 2023-11-13 VITALS
TEMPERATURE: 97 F | SYSTOLIC BLOOD PRESSURE: 145 MMHG | HEIGHT: 63 IN | HEART RATE: 89 BPM | BODY MASS INDEX: 21.3 KG/M2 | WEIGHT: 120.19 LBS | RESPIRATION RATE: 20 BRPM | OXYGEN SATURATION: 97 % | DIASTOLIC BLOOD PRESSURE: 79 MMHG

## 2023-11-13 DIAGNOSIS — Z51.11 MAINTENANCE CHEMOTHERAPY: ICD-10-CM

## 2023-11-13 DIAGNOSIS — C56.1 CARCINOMA OF RIGHT OVARY: Primary | ICD-10-CM

## 2023-11-13 PROCEDURE — 63600175 PHARM REV CODE 636 W HCPCS: Performed by: OBSTETRICS & GYNECOLOGY

## 2023-11-13 PROCEDURE — 25000003 PHARM REV CODE 250: Performed by: OBSTETRICS & GYNECOLOGY

## 2023-11-13 PROCEDURE — 96523 IRRIG DRUG DELIVERY DEVICE: CPT

## 2023-11-13 PROCEDURE — A4216 STERILE WATER/SALINE, 10 ML: HCPCS | Performed by: OBSTETRICS & GYNECOLOGY

## 2023-11-13 RX ORDER — SODIUM CHLORIDE 0.9 % (FLUSH) 0.9 %
10 SYRINGE (ML) INJECTION
Status: CANCELLED
Start: 2023-11-13

## 2023-11-13 RX ORDER — HEPARIN 100 UNIT/ML
500 SYRINGE INTRAVENOUS
Status: COMPLETED | OUTPATIENT
Start: 2023-11-13 | End: 2023-11-13

## 2023-11-13 RX ORDER — SODIUM CHLORIDE 0.9 % (FLUSH) 0.9 %
10 SYRINGE (ML) INJECTION
Status: DISCONTINUED | OUTPATIENT
Start: 2023-11-13 | End: 2023-11-13 | Stop reason: HOSPADM

## 2023-11-13 RX ORDER — HEPARIN 100 UNIT/ML
500 SYRINGE INTRAVENOUS
Status: CANCELLED
Start: 2023-11-13

## 2023-11-13 RX ADMIN — SODIUM CHLORIDE, PRESERVATIVE FREE 10 ML: 5 INJECTION INTRAVENOUS at 11:11

## 2023-11-13 RX ADMIN — HEPARIN 500 UNITS: 100 SYRINGE at 11:11

## 2023-11-15 ENCOUNTER — HOSPITAL ENCOUNTER (OUTPATIENT)
Dept: RADIOLOGY | Facility: HOSPITAL | Age: 83
Discharge: HOME | End: 2023-11-15
Attending: THORACIC SURGERY (CARDIOTHORACIC VASCULAR SURGERY)
Payer: MEDICARE

## 2023-11-15 DIAGNOSIS — Z90.2 STATUS POST LOBECTOMY OF LUNG: Primary | ICD-10-CM

## 2023-11-15 DIAGNOSIS — Z90.2 STATUS POST LOBECTOMY OF LUNG: ICD-10-CM

## 2023-11-15 PROCEDURE — 71046 X-RAY EXAM CHEST 2 VIEWS: CPT | Mod: TC

## 2023-11-21 ENCOUNTER — TELEPHONE (OUTPATIENT)
Dept: PULMONOLOGY | Facility: HOSPITAL | Age: 83
End: 2023-11-21

## 2023-11-21 DIAGNOSIS — C34.90 MUCINOUS ADENOCARCINOMA OF LUNG: Primary | ICD-10-CM

## 2023-11-22 NOTE — TELEPHONE ENCOUNTER
The patient's pathology has finally returned.  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  Her 1 lymph node was negative.  She is a pathologic stage T3 N0.  Will refer her to Oncology for evaluation to see if any further chemotherapy, radiotherapy, or immunotherapy is warranted.  She is aware of this.

## 2023-11-27 ENCOUNTER — TELEPHONE (OUTPATIENT)
Dept: HEMATOLOGY/ONCOLOGY | Facility: CLINIC | Age: 83
End: 2023-11-27

## 2023-11-27 NOTE — TELEPHONE ENCOUNTER
----- Message from Lizette Ibrahim sent at 11/27/2023  9:02 AM CST -----  The patient called to schedule a new patient appointment. She is referred by Dr. Christian.

## 2023-11-28 ENCOUNTER — LAB VISIT (OUTPATIENT)
Dept: LAB | Facility: HOSPITAL | Age: 83
End: 2023-11-28
Attending: FAMILY MEDICINE
Payer: MEDICARE

## 2023-11-28 DIAGNOSIS — N18.30 CONTROLLED TYPE 2 DIABETES MELLITUS WITH STAGE 3 CHRONIC KIDNEY DISEASE, WITHOUT LONG-TERM CURRENT USE OF INSULIN: ICD-10-CM

## 2023-11-28 DIAGNOSIS — E11.22 CONTROLLED TYPE 2 DIABETES MELLITUS WITH STAGE 3 CHRONIC KIDNEY DISEASE, WITHOUT LONG-TERM CURRENT USE OF INSULIN: ICD-10-CM

## 2023-11-28 DIAGNOSIS — E78.5 HYPERLIPIDEMIA ASSOCIATED WITH TYPE 2 DIABETES MELLITUS: ICD-10-CM

## 2023-11-28 DIAGNOSIS — E11.69 HYPERLIPIDEMIA ASSOCIATED WITH TYPE 2 DIABETES MELLITUS: ICD-10-CM

## 2023-11-28 LAB
ALBUMIN SERPL BCP-MCNC: 3.3 G/DL (ref 3.5–5.2)
ALP SERPL-CCNC: 57 U/L (ref 55–135)
ALT SERPL W/O P-5'-P-CCNC: 7 U/L (ref 10–44)
ANION GAP SERPL CALC-SCNC: 9 MMOL/L (ref 8–16)
AST SERPL-CCNC: 13 U/L (ref 10–40)
BASOPHILS # BLD AUTO: 0.05 K/UL (ref 0–0.2)
BASOPHILS NFR BLD: 0.7 % (ref 0–1.9)
BILIRUB SERPL-MCNC: 0.4 MG/DL (ref 0.1–1)
BUN SERPL-MCNC: 24 MG/DL (ref 8–23)
CALCIUM SERPL-MCNC: 9.4 MG/DL (ref 8.7–10.5)
CHLORIDE SERPL-SCNC: 110 MMOL/L (ref 95–110)
CHOLEST SERPL-MCNC: 200 MG/DL (ref 120–199)
CHOLEST/HDLC SERPL: 4.5 {RATIO} (ref 2–5)
CO2 SERPL-SCNC: 25 MMOL/L (ref 23–29)
CREAT SERPL-MCNC: 0.9 MG/DL (ref 0.5–1.4)
DIFFERENTIAL METHOD: ABNORMAL
EOSINOPHIL # BLD AUTO: 0.6 K/UL (ref 0–0.5)
EOSINOPHIL NFR BLD: 8.2 % (ref 0–8)
ERYTHROCYTE [DISTWIDTH] IN BLOOD BY AUTOMATED COUNT: 15.3 % (ref 11.5–14.5)
EST. GFR  (NO RACE VARIABLE): >60 ML/MIN/1.73 M^2
ESTIMATED AVG GLUCOSE: 105 MG/DL (ref 68–131)
GLUCOSE SERPL-MCNC: 101 MG/DL (ref 70–110)
HBA1C MFR BLD: 5.3 % (ref 4–5.6)
HCT VFR BLD AUTO: 39.2 % (ref 37–48.5)
HDLC SERPL-MCNC: 44 MG/DL (ref 40–75)
HDLC SERPL: 22 % (ref 20–50)
HGB BLD-MCNC: 11.7 G/DL (ref 12–16)
IMM GRANULOCYTES # BLD AUTO: 0.02 K/UL (ref 0–0.04)
IMM GRANULOCYTES NFR BLD AUTO: 0.3 % (ref 0–0.5)
LDLC SERPL CALC-MCNC: 137.2 MG/DL (ref 63–159)
LYMPHOCYTES # BLD AUTO: 1.1 K/UL (ref 1–4.8)
LYMPHOCYTES NFR BLD: 16.3 % (ref 18–48)
MCH RBC QN AUTO: 28.7 PG (ref 27–31)
MCHC RBC AUTO-ENTMCNC: 29.8 G/DL (ref 32–36)
MCV RBC AUTO: 96 FL (ref 82–98)
MONOCYTES # BLD AUTO: 0.7 K/UL (ref 0.3–1)
MONOCYTES NFR BLD: 10.2 % (ref 4–15)
NEUTROPHILS # BLD AUTO: 4.3 K/UL (ref 1.8–7.7)
NEUTROPHILS NFR BLD: 64.3 % (ref 38–73)
NONHDLC SERPL-MCNC: 156 MG/DL
NRBC BLD-RTO: 0 /100 WBC
PLATELET # BLD AUTO: 237 K/UL (ref 150–450)
PMV BLD AUTO: 10.2 FL (ref 9.2–12.9)
POTASSIUM SERPL-SCNC: 4.5 MMOL/L (ref 3.5–5.1)
PROT SERPL-MCNC: 6.4 G/DL (ref 6–8.4)
RBC # BLD AUTO: 4.08 M/UL (ref 4–5.4)
SODIUM SERPL-SCNC: 144 MMOL/L (ref 136–145)
TRIGL SERPL-MCNC: 94 MG/DL (ref 30–150)
WBC # BLD AUTO: 6.74 K/UL (ref 3.9–12.7)

## 2023-11-28 PROCEDURE — 80053 COMPREHEN METABOLIC PANEL: CPT | Mod: HCNC | Performed by: FAMILY MEDICINE

## 2023-11-28 PROCEDURE — 80061 LIPID PANEL: CPT | Mod: HCNC | Performed by: FAMILY MEDICINE

## 2023-11-28 PROCEDURE — 83036 HEMOGLOBIN GLYCOSYLATED A1C: CPT | Mod: HCNC | Performed by: FAMILY MEDICINE

## 2023-11-28 PROCEDURE — 36415 COLL VENOUS BLD VENIPUNCTURE: CPT | Mod: HCNC,PO | Performed by: FAMILY MEDICINE

## 2023-11-28 PROCEDURE — 85025 COMPLETE CBC W/AUTO DIFF WBC: CPT | Mod: HCNC | Performed by: FAMILY MEDICINE

## 2023-12-01 ENCOUNTER — OFFICE VISIT (OUTPATIENT)
Dept: HEMATOLOGY/ONCOLOGY | Facility: CLINIC | Age: 83
End: 2023-12-01
Payer: MEDICARE

## 2023-12-01 VITALS
RESPIRATION RATE: 17 BRPM | DIASTOLIC BLOOD PRESSURE: 76 MMHG | WEIGHT: 117.19 LBS | SYSTOLIC BLOOD PRESSURE: 174 MMHG | TEMPERATURE: 97 F | BODY MASS INDEX: 20.76 KG/M2 | HEART RATE: 102 BPM

## 2023-12-01 DIAGNOSIS — C34.90 MUCINOUS ADENOCARCINOMA OF LUNG: ICD-10-CM

## 2023-12-01 DIAGNOSIS — C34.31 MALIGNANT NEOPLASM OF LOWER LOBE OF RIGHT LUNG: Primary | ICD-10-CM

## 2023-12-01 PROCEDURE — 1126F AMNT PAIN NOTED NONE PRSNT: CPT | Mod: CPTII,S$GLB,, | Performed by: INTERNAL MEDICINE

## 2023-12-01 PROCEDURE — 3078F PR MOST RECENT DIASTOLIC BLOOD PRESSURE < 80 MM HG: ICD-10-PCS | Mod: CPTII,S$GLB,, | Performed by: INTERNAL MEDICINE

## 2023-12-01 PROCEDURE — 99205 OFFICE O/P NEW HI 60 MIN: CPT | Mod: S$GLB,,, | Performed by: INTERNAL MEDICINE

## 2023-12-01 PROCEDURE — 3078F DIAST BP <80 MM HG: CPT | Mod: CPTII,S$GLB,, | Performed by: INTERNAL MEDICINE

## 2023-12-01 PROCEDURE — 3077F SYST BP >= 140 MM HG: CPT | Mod: CPTII,S$GLB,, | Performed by: INTERNAL MEDICINE

## 2023-12-01 PROCEDURE — 3077F PR MOST RECENT SYSTOLIC BLOOD PRESSURE >= 140 MM HG: ICD-10-PCS | Mod: CPTII,S$GLB,, | Performed by: INTERNAL MEDICINE

## 2023-12-01 PROCEDURE — 1157F ADVNC CARE PLAN IN RCRD: CPT | Mod: CPTII,S$GLB,, | Performed by: INTERNAL MEDICINE

## 2023-12-01 PROCEDURE — 1126F PR PAIN SEVERITY QUANTIFIED, NO PAIN PRESENT: ICD-10-PCS | Mod: CPTII,S$GLB,, | Performed by: INTERNAL MEDICINE

## 2023-12-01 PROCEDURE — 1111F DSCHRG MED/CURRENT MED MERGE: CPT | Mod: CPTII,S$GLB,, | Performed by: INTERNAL MEDICINE

## 2023-12-01 PROCEDURE — 1101F PT FALLS ASSESS-DOCD LE1/YR: CPT | Mod: CPTII,S$GLB,, | Performed by: INTERNAL MEDICINE

## 2023-12-01 PROCEDURE — 3288F PR FALLS RISK ASSESSMENT DOCUMENTED: ICD-10-PCS | Mod: CPTII,S$GLB,, | Performed by: INTERNAL MEDICINE

## 2023-12-01 PROCEDURE — 1111F PR DISCHARGE MEDS RECONCILED W/ CURRENT OUTPATIENT MED LIST: ICD-10-PCS | Mod: CPTII,S$GLB,, | Performed by: INTERNAL MEDICINE

## 2023-12-01 PROCEDURE — 99205 PR OFFICE/OUTPT VISIT, NEW, LEVL V, 60-74 MIN: ICD-10-PCS | Mod: S$GLB,,, | Performed by: INTERNAL MEDICINE

## 2023-12-01 PROCEDURE — 1159F PR MEDICATION LIST DOCUMENTED IN MEDICAL RECORD: ICD-10-PCS | Mod: CPTII,S$GLB,, | Performed by: INTERNAL MEDICINE

## 2023-12-01 PROCEDURE — 1157F PR ADVANCE CARE PLAN OR EQUIV PRESENT IN MEDICAL RECORD: ICD-10-PCS | Mod: CPTII,S$GLB,, | Performed by: INTERNAL MEDICINE

## 2023-12-01 PROCEDURE — 1101F PR PT FALLS ASSESS DOC 0-1 FALLS W/OUT INJ PAST YR: ICD-10-PCS | Mod: CPTII,S$GLB,, | Performed by: INTERNAL MEDICINE

## 2023-12-01 PROCEDURE — 1159F MED LIST DOCD IN RCRD: CPT | Mod: CPTII,S$GLB,, | Performed by: INTERNAL MEDICINE

## 2023-12-01 PROCEDURE — 3288F FALL RISK ASSESSMENT DOCD: CPT | Mod: CPTII,S$GLB,, | Performed by: INTERNAL MEDICINE

## 2023-12-01 NOTE — PROGRESS NOTES
INITIAL [facility] HEM/ONC CONSULTATION      Subjective:       Patient ID: Alexa Otero is a 83 y.o. female.    8/15/2023 PET:  IMPRESSION:  1. Multifocal right lower lobe and right pleural FDG hypermetabolism as described, with associated right lower lobe consolidation. These are nonspecific and could be of infectious or inflammatory etiology in the clinical setting of chronic pneumonia, and or metastatic disease. Correlation with previous bronchoscopy results is needed.  2. Multiple new non hypermetabolic pulmonary nodules in both lungs, which are generally below size resolution for PET, but suspicious for pulmonary metastases.  3. No additional findings of FDG avid metastatic disease.    10/31/2023 RLL Lobectomy:  LUNG SYNOPTIC REPORT   PROCEDURE:     Lobectomy.   SPECIMEN LATERALITY:    Right   TUMOR SITE:     Lower lobe.   TUMOR SIZE:     Cannot be determined, 4.5 cm area of cavitation but    tumor appears to involve most if not all of the resected lobe.   TUMOR FOCALITY:    Single tumor.   HISTOLOGIC TYPE:    Invasive mucinous adenocarcinoma with pneumonic    pattern.   VISCEROPLEURAL INVASION:   Present   LYMPHOVASCULAR INVASION:   Not identified.   SPREAD OF TUMOR THROUGH AIR SPACES:  Present   DIRECT INVASION OF ADJACENT STRUCTURES: No adjacent structures present.   MARGINS:     All margins are uninvolved by tumor, margins examined:         Bronchial, distance of invasion of tumor from closest         margin: cannot be determined.   TREATMENT EFFECT:    No known presurgical therapy.   ADDITIONAL PATHOLOGIC FINDINGS:  Organized pulmonary emboli, pulmonary    infarct.   REGIONAL LYMPH NODES:    NUMBER OF LYMPH NODES INVOLVED:  Zero, number of lymph nodes examined:     1, yue         structures examined: 10 (hilar)   PATHOLOGIC STAGE CLASSIFICATION    OF PRIMARY TUMOR:    pT3    REGIONAL LYMPH NODES:   pN0     Comment: The sections show invasive mucinous adenocarcinoma with    findings that suggest so-called  pneumonic pattern. there is diffuse    involvement with tumor present in all the histologic sections, often    with a lepidic pattern suggesting spread through the air spaces. The    reported CT findings of a consolidated appearance correlates with the    histologic findings and the reported clinical presentation of a    year-long cough productive of purulent mucus. Mucus is also a common    presentation of this somewhat uncommon pulmonary malignancy. These    tumors are two complete stages pT3 when there appears to be involvement    of the entire lobe; this appears to be appropriate in this case. There    are also organizing/organized pulmonary emboli within the vasculature,    possibly indicating hypercoagulability of malignancy as can be seen    especially with mucinous tumors. The tissue sampled in block 2F is    likely an infarct related to this. The history of endometrioid    adenocarcinoma is noted, but the current tumor represents a primary    lung malignancy and is unrelated to the prior cancer.     10/13/2023-Echo:  Normal systolic function with EF 55-60%  Grade I diastolic dysfunction      Chief Complaint: No chief complaint on file.  Lung cancer    Ms. Otero presented to her PCP in June of 2022 with a cc of progressively worsening coughing.  She was initially treated for infection/inflammation but did not get any relief and was referred to Dr. FENG Christian for this.  She underwent further recommendations to try to decrease this cough but had no improvement.  This was done over the course of over a year.  During this process she was found to have a RLL infiltrative lesion.  She was referred to Dr. 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      Preceding the surgery she underwent cardiac clearance and during this work up was found to have coronary artery disease.  She underwent coronary stenting.  It is notable that she coded during a second angiogram but survived this  without neurologic damage.     Patient has a history of ovarian cancer diagnosed four year ago.  She states this was a stage I and she underwent surgery and chemotherapy with Dr. Noble and has not had further recurrence or problems with this.          Past Medical History:   Diagnosis Date    Arthritis     Cardiac arrest 10/2023    Cataract     Colon polyp     Diabetes mellitus type II 2012    Encounter for blood transfusion     Extra-ovarian endometrioid adenocarcinoma 2020    GERD (gastroesophageal reflux disease)     History of chronic cough     clear productive    Hyperlipidemia     Hypertension     Macular degeneration     Ovarian cancer     PONV (postoperative nausea and vomiting)     Squamous cell carcinoma 1980's    precancer of cervix       Past Surgical History:   Procedure Laterality Date    AUGMENTATION OF BREAST      BRONCHOSCOPY WITH FLUOROSCOPY Right 05/11/2023    Procedure: BRONCHOSCOPY, WITH FLUOROSCOPY;  Surgeon: Neva Christian MD;  Location: [facility];  Service: Pulmonary;  Laterality: Right;    CATARACT EXTRACTION BILATERAL W/ ANTERIOR VITRECTOMY Bilateral     CHOLECYSTECTOMY      COLONOSCOPY      COLONOSCOPY N/A 04/20/2023    Procedure: COLONOSCOPY;  Surgeon: Sabino Stephenson MD;  Location: [facility];  Service: Endoscopy;  Laterality: N/A;    CORONARY STENT PLACEMENT  10/2023    x 3    ESOPHAGOGASTRODUODENOSCOPY N/A 06/20/2018    Procedure: EGD (ESOPHAGOGASTRODUODENOSCOPY);  Surgeon: Sabino Stephenson MD;  Location: [facility];  Service: Endoscopy;  Laterality: N/A;    ESOPHAGOGASTRODUODENOSCOPY N/A 03/31/2023    Procedure: EGD (ESOPHAGOGASTRODUODENOSCOPY);  Surgeon: Sabino Stephenson MD;  Location: [facility];  Service: Endoscopy;  Laterality: N/A;    HYSTERECTOMY  1976    partial    INJECTION OF ANESTHETIC AGENT AROUND MEDIAL BRANCH NERVES INNERVATING LUMBAR FACET JOINT Right 05/10/2023    Procedure: Block-nerve-Lateral-branch-lumbar;  Surgeon: Agustin Robles MD;  Location: [facility];  Service:  Pain Management;  Laterality: Right;  L5 and s1,s2 LBB    INJECTION OF ANESTHETIC AGENT AROUND MEDIAL BRANCH NERVES INNERVATING LUMBAR FACET JOINT Right 05/30/2023    Procedure: Block-nerve-medial branch-lumbar;  Surgeon: Agustin Robles MD;  Location: [facility];  Service: Pain Management;  Laterality: Right;  L5, s1 ,s2 LBB #2    INJECTION OF ANESTHETIC AGENT AROUND NERVE Right 10/31/2023    Procedure: INTERCOSTAL NERVE BLOCK;  Surgeon: Washington Shankar MD;  Location: [facility] OR;  Service: Cardiothoracic;  Laterality: Right;    INJECTION, SACROILIAC JOINT Right 01/26/2023    Procedure: INJECTION,SACROILIAC JOINT;  Surgeon: Agustin Robles MD;  Location: [facility] OR;  Service: Pain Management;  Laterality: Right;    INSERTION OF TUNNELED CENTRAL VENOUS CATHETER (CVC) WITH SUBCUTANEOUS PORT Right 10/19/2020    Procedure: INSERTION, PORT-A-CATH;  Surgeon: Misti Mcfarland MD;  Location: [facility] OR;  Service: General;  Laterality: Right;    INTRAMEDULLARY RODDING OF TROCHANTER OF FEMUR Right 11/28/2021    Procedure: INSERTION, INTRAMEDULLARY LUC, FEMUR, TROCHANTER/RIGHT TFN DR FAJARDO NOTIFIED REP;  Surgeon: Kp Fajardo MD;  Location: [facility] OR;  Service: Orthopedics;  Laterality: Right;  SKIP    ROBOT-ASSISTED LAPAROSCOPIC LYMPHADENECTOMY USING DA NELLA XI N/A 09/21/2020    Procedure: XI ROBOTIC LYMPHADENECTOMY-pelvic and kell-aortic;  Surgeon: Altagracia Ray MD;  Location: [facility] OR;  Service: OB/GYN;  Laterality: N/A;    ROBOT-ASSISTED LAPAROSCOPIC OMENTECTOMY USING DA NELLA XI N/A 09/21/2020    Procedure: XI ROBOTIC OMENTECTOMY;  Surgeon: Altagracia Ray MD;  Location: [facility] OR;  Service: OB/GYN;  Laterality: N/A;    ROBOT-ASSISTED LAPAROSCOPIC SALPINGO-OOPHORECTOMY USING DA NELLA XI Bilateral 09/21/2020    Procedure: XI ROBOTIC SALPINGO-OOPHORECTOMY;  Surgeon: Altagracia Ray MD;  Location: [facility] OR;  Service: OB/GYN;  Laterality: Bilateral;    THORACOSCOPIC BIOPSY OF PLEURA Right 10/31/2023    Procedure: VATS, WITH  PLEURA BIOPSY;  Surgeon: Washington Shankar MD;  Location: [facility];  Service: Cardiothoracic;  Laterality: Right;  FROZEN SECTION   **KRISTEN-ASSISDT**    THORACOTOMY Right 10/31/2023    Procedure: THORACOTOMY;  Surgeon: Washington Shankar MD;  Location: [facility];  Service: Cardiothoracic;  Laterality: Right;    UPPER GASTROINTESTINAL ENDOSCOPY         Social History     Socioeconomic History    Marital status:    Tobacco Use    Smoking status: Former     Current packs/day: 0.00     Average packs/day: 1 pack/day for 20.0 years (20.0 ttl pk-yrs)     Types: Cigarettes     Start date:      Quit date:      Years since quittin.9    Smokeless tobacco: Never    Tobacco comments:     quit 40 yrs ago   Substance and Sexual Activity    Alcohol use: Yes     Alcohol/week: 1.0 standard drink of alcohol     Types: 1 Glasses of wine per week     Comment: occasional    Drug use: No    Sexual activity: Not Currently     Partners: Male     Social Determinants of Health     Financial Resource Strain: Low Risk  (2023)    Overall Financial Resource Strain (CARDIA)     Difficulty of Paying Living Expenses: Not very hard   Food Insecurity: Food Insecurity Present (2023)    Hunger Vital Sign     Worried About Running Out of Food in the Last Year: Sometimes true     Ran Out of Food in the Last Year: Never true   Transportation Needs: No Transportation Needs (2023)    PRAPARE - Transportation     Lack of Transportation (Medical): No     Lack of Transportation (Non-Medical): No   Physical Activity: Inactive (2023)    Exercise Vital Sign     Days of Exercise per Week: 0 days     Minutes of Exercise per Session: 0 min   Stress: Stress Concern Present (2023)    [employer] - Occupational Stress Questionnaire     Feeling of Stress : To some extent   Social Connections: Unknown (2023)    Social Connection and Isolation Panel [NHANES]     Frequency of Communication  with Friends and Family: More than three times a week     Frequency of Social Gatherings with Friends and Family: Once a week     Active Member of Clubs or Organizations: No     Attends Club or Organization Meetings: Never     Marital Status:    Housing Stability: Low Risk  (11/27/2023)    Housing Stability Vital Sign     Unable to Pay for Housing in the Last Year: No     Number of Places Lived in the Last Year: 1     Unstable Housing in the Last Year: No       Family History   Problem Relation Age of Onset    Cancer Mother 57        lung    Cancer Father 56        oral    Skin cancer Sister     Skin cancer Brother     Melanoma Brother     Skin cancer Brother     Breast cancer Maternal Grandmother     Breast cancer Paternal Grandmother     Colon polyps Daughter     Psoriasis Neg Hx     Lupus Neg Hx     Eczema Neg Hx     Colon cancer Neg Hx     Crohn's disease Neg Hx     Esophageal cancer Neg Hx     Stomach cancer Neg Hx     Ulcerative colitis Neg Hx        Review of patient's allergies indicates:  No Known Allergies    Current Outpatient Medications:     amiodarone (PACERONE) 200 MG Tab, Take 1 tablet (200 mg total) by mouth 2 (two) times daily., Disp: 60 tablet, Rfl: 11    aspirin (ECOTRIN) 81 MG EC tablet, Take 81 mg by mouth once daily., Disp: , Rfl:     clopidogreL (PLAVIX) 75 mg tablet, Take 75 mg by mouth once daily., Disp: , Rfl:     famotidine (PEPCID) 20 MG tablet, Take 1 tablet (20 mg total) by mouth once daily. for 30 doses, Disp: 30 tablet, Rfl: 0    gabapentin (NEURONTIN) 100 MG capsule, TAKE 1 CAPSULE(100 MG) BY MOUTH TWICE DAILY (Patient taking differently: Take 100 mg by mouth 2 (two) times daily.), Disp: 180 capsule, Rfl: 3    HYDROcodone-acetaminophen (NORCO) 5-325 mg per tablet, Take 1 tablet by mouth every 6 (six) hours as needed for Pain., Disp: 28 tablet, Rfl: 0    melatonin 5 mg Chew, Take 2 tablets by mouth nightly as needed (insomnia)., Disp: , Rfl:     metFORMIN (GLUCOPHAGE) 500 MG  tablet, TAKE 1 TABLET(500 MG) BY MOUTH TWICE DAILY WITH MEALS (Patient taking differently: Take 500 mg by mouth 2 (two) times a day.), Disp: 180 tablet, Rfl: 0    metoprolol succinate (TOPROL-XL) 25 MG 24 hr tablet, Take 25 mg by mouth once daily., Disp: , Rfl:     VIT A/VIT C/VIT E/ZINC/COPPER (ICAPS AREDS ORAL), Take 1 capsule by mouth 2 (two) times a day. , Disp: , Rfl:     All medications and past history have been reviewed.    Review of Systems   Constitutional:  Positive for appetite change and unexpected weight change.   HENT:  Negative for mouth sores.    Eyes:  Negative for visual disturbance.   Respiratory:  Positive for cough and shortness of breath.    Cardiovascular:  Positive for chest pain.   Gastrointestinal:  Negative for abdominal pain and diarrhea.   Genitourinary:  Negative for frequency.   Musculoskeletal:  Negative for back pain.   Skin:  Negative for rash.   Neurological:  Negative for headaches.   Hematological:  Negative for adenopathy.   Psychiatric/Behavioral:  The patient is not nervous/anxious.        Objective:        BP (!) 174/76   Pulse 102   Temp 97.3 °F (36.3 °C)   Resp 17   Wt 53.2 kg (117 lb 3.2 oz)   BMI 20.76 kg/m²     Physical Exam  Constitutional:       Appearance: Normal appearance. She is well-developed.   HENT:      Head: Normocephalic and atraumatic.      Right Ear: External ear normal.      Left Ear: External ear normal.   Eyes:      Conjunctiva/sclera: Conjunctivae normal.      Pupils: Pupils are equal, round, and reactive to light.   Neck:      Thyroid: No thyromegaly.      Trachea: No tracheal deviation.   Cardiovascular:      Rate and Rhythm: Normal rate and regular rhythm.   Pulmonary:      Effort: Pulmonary effort is normal.      Breath sounds: Normal breath sounds.   Abdominal:      General: Bowel sounds are normal.      Tenderness: There is no abdominal tenderness.   Skin:     Findings: No rash.   Neurological:      Mental Status: She is alert.       Comments: Neuro intact througout   Psychiatric:         Behavior: Behavior normal.         Thought Content: Thought content normal.         Judgment: Judgment normal.           Lab  Recent Results (from the past 336 hour(s))   CBC Auto Differential    Collection Time: 11/28/23  9:24 AM   Result Value Ref Range    WBC 6.74 3.90 - 12.70 K/uL    Hemoglobin 11.7 (L) 12.0 - 16.0 g/dL    Hematocrit 39.2 37.0 - 48.5 %    Platelets 237 150 - 450 K/uL     CMP  Sodium   Date Value Ref Range Status   11/28/2023 144 136 - 145 mmol/L Final   11/28/2023 143 136 - 145 mmol/L Final     Potassium   Date Value Ref Range Status   11/28/2023 4.5 3.5 - 5.1 mmol/L Final   11/28/2023 4.1 3.5 - 5.1 mmol/L Final     Chloride   Date Value Ref Range Status   11/28/2023 110 95 - 110 mmol/L Final   11/28/2023 108 95 - 110 mmol/L Final     CO2   Date Value Ref Range Status   11/28/2023 25 23 - 29 mmol/L Final   11/28/2023 25 23 - 29 mmol/L Final     Glucose   Date Value Ref Range Status   11/28/2023 101 70 - 110 mg/dL Final   11/28/2023 102 70 - 110 mg/dL Final     BUN   Date Value Ref Range Status   11/28/2023 24 (H) 8 - 23 mg/dL Final   11/28/2023 23 8 - 23 mg/dL Final     Creatinine   Date Value Ref Range Status   11/28/2023 0.9 0.5 - 1.4 mg/dL Final   11/28/2023 0.9 0.5 - 1.4 mg/dL Final     Calcium   Date Value Ref Range Status   11/28/2023 9.4 8.7 - 10.5 mg/dL Final   11/28/2023 9.4 8.7 - 10.5 mg/dL Final     Total Protein   Date Value Ref Range Status   11/28/2023 6.4 6.0 - 8.4 g/dL Final     Albumin   Date Value Ref Range Status   11/28/2023 3.3 (L) 3.5 - 5.2 g/dL Final     Total Bilirubin   Date Value Ref Range Status   11/28/2023 0.4 0.1 - 1.0 mg/dL Final     Comment:     For infants and newborns, interpretation of results should be based  on gestational age, weight and in agreement with clinical  observations.    Premature Infant recommended reference ranges:  Up to 24 hours.............<8.0 mg/dL  Up to 48 hours............<12.0  mg/dL  3-5 days..................<15.0 mg/dL  6-29 days.................<15.0 mg/dL       Alkaline Phosphatase   Date Value Ref Range Status   11/28/2023 57 55 - 135 U/L Final     AST   Date Value Ref Range Status   11/28/2023 13 10 - 40 U/L Final     ALT   Date Value Ref Range Status   11/28/2023 7 (L) 10 - 44 U/L Final     Anion Gap   Date Value Ref Range Status   11/28/2023 9 8 - 16 mmol/L Final   11/28/2023 10 8 - 16 mmol/L Final     eGFR if    Date Value Ref Range Status   04/28/2022 40.6 (A) >60 mL/min/1.73 m^2 Final     eGFR if non    Date Value Ref Range Status   04/28/2022 35.3 (A) >60 mL/min/1.73 m^2 Final     Comment:     Calculation used to obtain the estimated glomerular filtration  rate (eGFR) is the CKD-EPI equation.            Specimen (24h ago, onward)      None                  All lab results and imaging results have been reviewed and discussed with the patient.     Assessment:       1. Malignant neoplasm of lower lobe of right lung    2. Mucinous adenocarcinoma of lung      Problem List Items Addressed This Visit       Malignant neoplasm of lower lobe of right lung - Primary     I had a long discussion with Ms. Otero about this diagnosis of Stage IIB adenocarcinoma.  From a stage perspective and disease risk perspective, she is clearly at very high risk of recurrence and a candidate for adjuvant therapy.  Traditionally,  this therapy would include platinum based chemotherapy and I would follow her with a year of IO therapy with pembrolizumab.      We discussed her recent cardiac stent placement and her age of 83, both of which significantly raise her risk associated with chemotherapy.  I also discussed this risk and that a myriad of problems can occur which can lead to death in this situation.  Unfortunately,  I feel her risk of recurrent lung cancer far exceeds this risk and that this therapy should be entertained.  We will of course respect Ms. Otero's wishes  in this matter.     Note is made of her ovarian cancer history but the pathologist is clear that this tumor is of lung origin.      Will also need NGS testing as EGFR and other mutations could play a role in the treatment decisions here. She does have a smoking history but quit this 40 years ago so this testing is very important and will not plan chemotherapy until the result of this testing is known.   Will request this of the labs.                Other Visit Diagnoses       Mucinous adenocarcinoma of lung               Cancer Staging   No matching staging information was found for the patient.      Plan:         Follow up in about 3 weeks (around 12/22/2023).       The plan was discussed with the patient and all questions/concerns have been answered to the patient's satisfaction.

## 2023-12-01 NOTE — ASSESSMENT & PLAN NOTE
I had a long discussion with Ms. Otero about this diagnosis of Stage IIB adenocarcinoma.  From a stage perspective and disease risk perspective, she is clearly at very high risk of recurrence and a candidate for adjuvant therapy.  Traditionally,  this therapy would include platinum based chemotherapy and I would follow her with a year of IO therapy with pembrolizumab.      We discussed her recent cardiac stent placement and her age of 83, both of which significantly raise her risk associated with chemotherapy.  I also discussed this risk and that a myriad of problems can occur which can lead to death in this situation.  Unfortunately,  I feel her risk of recurrent lung cancer far exceeds this risk and that this therapy should be entertained.  We will of course respect Ms. Otero's wishes in this matter.     Note is made of her ovarian cancer history but the pathologist is clear that this tumor is of lung origin.      Will also need NGS testing as EGFR and other mutations could play a role in the treatment decisions here. She does have a smoking history but quit this 40 years ago so this testing is very important and will not plan chemotherapy until the result of this testing is known.   Will request this of the labs.

## 2023-12-03 ENCOUNTER — HOSPITAL ENCOUNTER (INPATIENT)
Facility: HOSPITAL | Age: 83
LOS: 2 days | Discharge: HOME | DRG: 871 | End: 2023-12-06
Attending: STUDENT IN AN ORGANIZED HEALTH CARE EDUCATION/TRAINING PROGRAM | Admitting: STUDENT IN AN ORGANIZED HEALTH CARE EDUCATION/TRAINING PROGRAM
Payer: MEDICARE

## 2023-12-03 DIAGNOSIS — C34.90 BRONCHOGENIC CARCINOMA: ICD-10-CM

## 2023-12-03 DIAGNOSIS — C34.31 MALIGNANT NEOPLASM OF LOWER LOBE OF RIGHT LUNG: ICD-10-CM

## 2023-12-03 DIAGNOSIS — J90 PLEURAL EFFUSION: ICD-10-CM

## 2023-12-03 DIAGNOSIS — R50.9 FEVER: ICD-10-CM

## 2023-12-03 DIAGNOSIS — A41.9 SEPSIS, DUE TO UNSPECIFIED ORGANISM, UNSPECIFIED WHETHER ACUTE ORGAN DYSFUNCTION PRESENT: Primary | ICD-10-CM

## 2023-12-03 DIAGNOSIS — R00.0 TACHYCARDIA: ICD-10-CM

## 2023-12-03 PROBLEM — R79.89 ELEVATED TROPONIN: Status: ACTIVE | Noted: 2023-12-03

## 2023-12-03 LAB
ALBUMIN SERPL BCP-MCNC: 3.4 G/DL (ref 3.5–5.2)
ALP SERPL-CCNC: 45 U/L (ref 55–135)
ALT SERPL W/O P-5'-P-CCNC: 12 U/L (ref 10–44)
ANION GAP SERPL CALC-SCNC: 11 MMOL/L (ref 8–16)
AST SERPL-CCNC: 13 U/L (ref 10–40)
BILIRUB SERPL-MCNC: 0.5 MG/DL (ref 0.1–1)
BNP SERPL-MCNC: 665 PG/ML (ref 0–99)
BUN SERPL-MCNC: 24 MG/DL (ref 8–23)
CALCIUM SERPL-MCNC: 8.8 MG/DL (ref 8.7–10.5)
CHLORIDE SERPL-SCNC: 105 MMOL/L (ref 95–110)
CO2 SERPL-SCNC: 22 MMOL/L (ref 23–29)
CREAT SERPL-MCNC: 1 MG/DL (ref 0.5–1.4)
EST. GFR  (NO RACE VARIABLE): 56.2 ML/MIN/1.73 M^2
GLUCOSE SERPL-MCNC: 123 MG/DL (ref 70–110)
GLUCOSE SERPL-MCNC: 126 MG/DL (ref 70–110)
INFLUENZA A, MOLECULAR: NEGATIVE
INFLUENZA B, MOLECULAR: NEGATIVE
INR PPP: 1.3 (ref 0.8–1.2)
LACTATE SERPL-SCNC: 1.2 MMOL/L (ref 0.5–1.9)
LIPASE SERPL-CCNC: 8 U/L (ref 4–60)
MAGNESIUM SERPL-MCNC: 1.3 MG/DL (ref 1.6–2.6)
POTASSIUM SERPL-SCNC: 3.5 MMOL/L (ref 3.5–5.1)
PROT SERPL-MCNC: 6.2 G/DL (ref 6–8.4)
PROTHROMBIN TIME: 14.3 SEC (ref 9–12.5)
SARS-COV-2 RDRP RESP QL NAA+PROBE: NEGATIVE
SODIUM SERPL-SCNC: 138 MMOL/L (ref 136–145)
SPECIMEN SOURCE: NORMAL
TROPONIN I SERPL HS-MCNC: 16.6 PG/ML (ref 0–14.9)
TSH SERPL DL<=0.005 MIU/L-ACNC: 0.77 UIU/ML (ref 0.34–5.6)

## 2023-12-03 PROCEDURE — G0378 HOSPITAL OBSERVATION PER HR: HCPCS

## 2023-12-03 PROCEDURE — 80053 COMPREHEN METABOLIC PANEL: CPT | Performed by: STUDENT IN AN ORGANIZED HEALTH CARE EDUCATION/TRAINING PROGRAM

## 2023-12-03 PROCEDURE — U0002 COVID-19 LAB TEST NON-CDC: HCPCS | Performed by: STUDENT IN AN ORGANIZED HEALTH CARE EDUCATION/TRAINING PROGRAM

## 2023-12-03 PROCEDURE — 25000003 PHARM REV CODE 250: Performed by: STUDENT IN AN ORGANIZED HEALTH CARE EDUCATION/TRAINING PROGRAM

## 2023-12-03 PROCEDURE — 85610 PROTHROMBIN TIME: CPT | Performed by: STUDENT IN AN ORGANIZED HEALTH CARE EDUCATION/TRAINING PROGRAM

## 2023-12-03 PROCEDURE — 96367 TX/PROPH/DG ADDL SEQ IV INF: CPT

## 2023-12-03 PROCEDURE — 83690 ASSAY OF LIPASE: CPT | Performed by: STUDENT IN AN ORGANIZED HEALTH CARE EDUCATION/TRAINING PROGRAM

## 2023-12-03 PROCEDURE — 87502 INFLUENZA DNA AMP PROBE: CPT | Performed by: STUDENT IN AN ORGANIZED HEALTH CARE EDUCATION/TRAINING PROGRAM

## 2023-12-03 PROCEDURE — 63600175 PHARM REV CODE 636 W HCPCS: Performed by: STUDENT IN AN ORGANIZED HEALTH CARE EDUCATION/TRAINING PROGRAM

## 2023-12-03 PROCEDURE — 83735 ASSAY OF MAGNESIUM: CPT | Performed by: STUDENT IN AN ORGANIZED HEALTH CARE EDUCATION/TRAINING PROGRAM

## 2023-12-03 PROCEDURE — 84443 ASSAY THYROID STIM HORMONE: CPT | Performed by: STUDENT IN AN ORGANIZED HEALTH CARE EDUCATION/TRAINING PROGRAM

## 2023-12-03 PROCEDURE — 85027 COMPLETE CBC AUTOMATED: CPT | Performed by: STUDENT IN AN ORGANIZED HEALTH CARE EDUCATION/TRAINING PROGRAM

## 2023-12-03 PROCEDURE — 36415 COLL VENOUS BLD VENIPUNCTURE: CPT | Performed by: STUDENT IN AN ORGANIZED HEALTH CARE EDUCATION/TRAINING PROGRAM

## 2023-12-03 PROCEDURE — 84484 ASSAY OF TROPONIN QUANT: CPT | Performed by: STUDENT IN AN ORGANIZED HEALTH CARE EDUCATION/TRAINING PROGRAM

## 2023-12-03 PROCEDURE — 83605 ASSAY OF LACTIC ACID: CPT | Performed by: STUDENT IN AN ORGANIZED HEALTH CARE EDUCATION/TRAINING PROGRAM

## 2023-12-03 PROCEDURE — 96366 THER/PROPH/DIAG IV INF ADDON: CPT

## 2023-12-03 PROCEDURE — 93010 ELECTROCARDIOGRAM REPORT: CPT | Mod: ,,, | Performed by: GENERAL PRACTICE

## 2023-12-03 PROCEDURE — 93005 ELECTROCARDIOGRAM TRACING: CPT | Performed by: GENERAL PRACTICE

## 2023-12-03 PROCEDURE — 96365 THER/PROPH/DIAG IV INF INIT: CPT

## 2023-12-03 PROCEDURE — 87040 BLOOD CULTURE FOR BACTERIA: CPT | Mod: 59 | Performed by: STUDENT IN AN ORGANIZED HEALTH CARE EDUCATION/TRAINING PROGRAM

## 2023-12-03 PROCEDURE — 99285 EMERGENCY DEPT VISIT HI MDM: CPT | Mod: 25

## 2023-12-03 PROCEDURE — 83880 ASSAY OF NATRIURETIC PEPTIDE: CPT | Performed by: STUDENT IN AN ORGANIZED HEALTH CARE EDUCATION/TRAINING PROGRAM

## 2023-12-03 PROCEDURE — 85007 BL SMEAR W/DIFF WBC COUNT: CPT | Performed by: STUDENT IN AN ORGANIZED HEALTH CARE EDUCATION/TRAINING PROGRAM

## 2023-12-03 PROCEDURE — 93010 EKG 12-LEAD: ICD-10-PCS | Mod: ,,, | Performed by: GENERAL PRACTICE

## 2023-12-03 RX ORDER — HYDROCODONE BITARTRATE AND ACETAMINOPHEN 10; 325 MG/1; MG/1
1 TABLET ORAL EVERY 4 HOURS PRN
Status: DISCONTINUED | OUTPATIENT
Start: 2023-12-03 | End: 2023-12-06 | Stop reason: HOSPADM

## 2023-12-03 RX ORDER — CLOPIDOGREL BISULFATE 75 MG/1
75 TABLET ORAL DAILY
Status: DISCONTINUED | OUTPATIENT
Start: 2023-12-04 | End: 2023-12-06 | Stop reason: HOSPADM

## 2023-12-03 RX ORDER — ENOXAPARIN SODIUM 100 MG/ML
40 INJECTION SUBCUTANEOUS EVERY 24 HOURS
Status: DISCONTINUED | OUTPATIENT
Start: 2023-12-04 | End: 2023-12-06 | Stop reason: HOSPADM

## 2023-12-03 RX ORDER — LANOLIN ALCOHOL/MO/W.PET/CERES
800 CREAM (GRAM) TOPICAL
Status: DISCONTINUED | OUTPATIENT
Start: 2023-12-03 | End: 2023-12-04

## 2023-12-03 RX ORDER — SODIUM,POTASSIUM PHOSPHATES 280-250MG
2 POWDER IN PACKET (EA) ORAL
Status: DISCONTINUED | OUTPATIENT
Start: 2023-12-03 | End: 2023-12-04

## 2023-12-03 RX ORDER — GLUCAGON 1 MG
1 KIT INJECTION
Status: DISCONTINUED | OUTPATIENT
Start: 2023-12-03 | End: 2023-12-06 | Stop reason: HOSPADM

## 2023-12-03 RX ORDER — GABAPENTIN 100 MG/1
100 CAPSULE ORAL 2 TIMES DAILY
Status: DISCONTINUED | OUTPATIENT
Start: 2023-12-03 | End: 2023-12-06 | Stop reason: HOSPADM

## 2023-12-03 RX ORDER — IBUPROFEN 200 MG
16 TABLET ORAL
Status: DISCONTINUED | OUTPATIENT
Start: 2023-12-03 | End: 2023-12-06 | Stop reason: HOSPADM

## 2023-12-03 RX ORDER — LANOLIN ALCOHOL/MO/W.PET/CERES
400 CREAM (GRAM) TOPICAL DAILY
COMMUNITY
End: 2024-01-22 | Stop reason: SDUPTHER

## 2023-12-03 RX ORDER — INSULIN ASPART 100 [IU]/ML
0-10 INJECTION, SOLUTION INTRAVENOUS; SUBCUTANEOUS
Status: DISCONTINUED | OUTPATIENT
Start: 2023-12-03 | End: 2023-12-06 | Stop reason: HOSPADM

## 2023-12-03 RX ORDER — AMIODARONE HYDROCHLORIDE 200 MG/1
200 TABLET ORAL 2 TIMES DAILY
Status: DISCONTINUED | OUTPATIENT
Start: 2023-12-03 | End: 2023-12-06 | Stop reason: HOSPADM

## 2023-12-03 RX ORDER — FAMOTIDINE 20 MG/1
20 TABLET, FILM COATED ORAL DAILY
Status: DISCONTINUED | OUTPATIENT
Start: 2023-12-04 | End: 2023-12-06 | Stop reason: HOSPADM

## 2023-12-03 RX ORDER — IBUPROFEN 200 MG
24 TABLET ORAL
Status: DISCONTINUED | OUTPATIENT
Start: 2023-12-03 | End: 2023-12-06 | Stop reason: HOSPADM

## 2023-12-03 RX ORDER — METOPROLOL SUCCINATE 25 MG/1
25 TABLET, EXTENDED RELEASE ORAL DAILY
Status: DISCONTINUED | OUTPATIENT
Start: 2023-12-04 | End: 2023-12-06 | Stop reason: HOSPADM

## 2023-12-03 RX ORDER — SODIUM CHLORIDE 0.9 % (FLUSH) 0.9 %
10 SYRINGE (ML) INJECTION
Status: DISCONTINUED | OUTPATIENT
Start: 2023-12-03 | End: 2023-12-06 | Stop reason: HOSPADM

## 2023-12-03 RX ORDER — ONDANSETRON 2 MG/ML
4 INJECTION INTRAMUSCULAR; INTRAVENOUS EVERY 8 HOURS PRN
Status: DISCONTINUED | OUTPATIENT
Start: 2023-12-03 | End: 2023-12-06 | Stop reason: HOSPADM

## 2023-12-03 RX ORDER — SODIUM CHLORIDE, SODIUM LACTATE, POTASSIUM CHLORIDE, CALCIUM CHLORIDE 600; 310; 30; 20 MG/100ML; MG/100ML; MG/100ML; MG/100ML
INJECTION, SOLUTION INTRAVENOUS CONTINUOUS
Status: DISCONTINUED | OUTPATIENT
Start: 2023-12-03 | End: 2023-12-04

## 2023-12-03 RX ORDER — ENOXAPARIN SODIUM 100 MG/ML
40 INJECTION SUBCUTANEOUS EVERY 24 HOURS
Status: DISCONTINUED | OUTPATIENT
Start: 2023-12-03 | End: 2023-12-03

## 2023-12-03 RX ORDER — ACETAMINOPHEN 325 MG/1
650 TABLET ORAL EVERY 8 HOURS PRN
Status: DISCONTINUED | OUTPATIENT
Start: 2023-12-03 | End: 2023-12-06 | Stop reason: HOSPADM

## 2023-12-03 RX ORDER — ASPIRIN 81 MG/1
81 TABLET ORAL DAILY
Status: DISCONTINUED | OUTPATIENT
Start: 2023-12-04 | End: 2023-12-06 | Stop reason: HOSPADM

## 2023-12-03 RX ORDER — TALC
6 POWDER (GRAM) TOPICAL NIGHTLY PRN
Status: DISCONTINUED | OUTPATIENT
Start: 2023-12-03 | End: 2023-12-06 | Stop reason: HOSPADM

## 2023-12-03 RX ORDER — LANOLIN ALCOHOL/MO/W.PET/CERES
400 CREAM (GRAM) TOPICAL DAILY
Status: DISCONTINUED | OUTPATIENT
Start: 2023-12-04 | End: 2023-12-04

## 2023-12-03 RX ORDER — HYDROCODONE BITARTRATE AND ACETAMINOPHEN 5; 325 MG/1; MG/1
1 TABLET ORAL EVERY 4 HOURS PRN
Status: DISCONTINUED | OUTPATIENT
Start: 2023-12-03 | End: 2023-12-06 | Stop reason: HOSPADM

## 2023-12-03 RX ADMIN — GABAPENTIN 100 MG: 100 CAPSULE ORAL at 10:12

## 2023-12-03 RX ADMIN — SODIUM CHLORIDE, SODIUM LACTATE, POTASSIUM CHLORIDE, AND CALCIUM CHLORIDE 1000 ML: .6; .31; .03; .02 INJECTION, SOLUTION INTRAVENOUS at 07:12

## 2023-12-03 RX ADMIN — SODIUM CHLORIDE, SODIUM LACTATE, POTASSIUM CHLORIDE, AND CALCIUM CHLORIDE: .6; .31; .03; .02 INJECTION, SOLUTION INTRAVENOUS at 10:12

## 2023-12-03 RX ADMIN — AMIODARONE HYDROCHLORIDE 200 MG: 200 TABLET ORAL at 10:12

## 2023-12-03 RX ADMIN — VANCOMYCIN HYDROCHLORIDE 1250 MG: 1.25 INJECTION, POWDER, LYOPHILIZED, FOR SOLUTION INTRAVENOUS at 07:12

## 2023-12-03 RX ADMIN — POTASSIUM BICARBONATE 50 MEQ: 977.5 TABLET, EFFERVESCENT ORAL at 10:12

## 2023-12-03 RX ADMIN — CEFEPIME HYDROCHLORIDE 2 G: 2 INJECTION, POWDER, FOR SOLUTION INTRAVENOUS at 07:12

## 2023-12-03 RX ADMIN — Medication 800 MG: at 10:12

## 2023-12-04 ENCOUNTER — PATIENT MESSAGE (OUTPATIENT)
Dept: PULMONOLOGY | Facility: CLINIC | Age: 83
End: 2023-12-04

## 2023-12-04 PROBLEM — J18.9 PNEUMONIA: Status: ACTIVE | Noted: 2023-12-04

## 2023-12-04 PROBLEM — R50.9 FEBRILE: Status: RESOLVED | Noted: 2023-12-03 | Resolved: 2023-12-04

## 2023-12-04 PROBLEM — C34.90 BRONCHOGENIC CARCINOMA: Status: ACTIVE | Noted: 2023-12-04

## 2023-12-04 PROBLEM — J90 PLEURAL EFFUSION: Status: ACTIVE | Noted: 2023-12-04

## 2023-12-04 LAB
ADENOVIRUS: NOT DETECTED
ALBUMIN SERPL BCP-MCNC: 2.8 G/DL (ref 3.5–5.2)
ALP SERPL-CCNC: 36 U/L (ref 55–135)
ALT SERPL W/O P-5'-P-CCNC: 9 U/L (ref 10–44)
ANION GAP SERPL CALC-SCNC: 7 MMOL/L (ref 8–16)
ANISOCYTOSIS BLD QL SMEAR: SLIGHT
ANISOCYTOSIS BLD QL SMEAR: SLIGHT
AST SERPL-CCNC: 10 U/L (ref 10–40)
BACTERIA #/AREA URNS HPF: NEGATIVE /HPF
BASOPHILS NFR BLD: 0 % (ref 0–1.9)
BASOPHILS NFR BLD: 0 % (ref 0–1.9)
BILIRUB SERPL-MCNC: 0.5 MG/DL (ref 0.1–1)
BILIRUB UR QL STRIP: NEGATIVE
BILIRUB UR QL STRIP: NEGATIVE
BORDETELLA PARAPERTUSSIS (IS1001): NOT DETECTED
BORDETELLA PERTUSSIS (PTXP): NOT DETECTED
BUN SERPL-MCNC: 27 MG/DL (ref 8–23)
CALCIUM SERPL-MCNC: 8 MG/DL (ref 8.7–10.5)
CHLAMYDIA PNEUMONIAE: NOT DETECTED
CHLORIDE SERPL-SCNC: 105 MMOL/L (ref 95–110)
CLARITY UR: ABNORMAL
CLARITY UR: ABNORMAL
CO2 SERPL-SCNC: 26 MMOL/L (ref 23–29)
COLOR UR: YELLOW
COLOR UR: YELLOW
CORONAVIRUS 229E, COMMON COLD VIRUS: NOT DETECTED
CORONAVIRUS HKU1, COMMON COLD VIRUS: NOT DETECTED
CORONAVIRUS NL63, COMMON COLD VIRUS: NOT DETECTED
CORONAVIRUS OC43, COMMON COLD VIRUS: NOT DETECTED
CREAT SERPL-MCNC: 0.9 MG/DL (ref 0.5–1.4)
DIFFERENTIAL METHOD: ABNORMAL
DIFFERENTIAL METHOD: ABNORMAL
EOSINOPHIL NFR BLD: 0 % (ref 0–8)
EOSINOPHIL NFR BLD: 0 % (ref 0–8)
ERYTHROCYTE [DISTWIDTH] IN BLOOD BY AUTOMATED COUNT: 14.8 % (ref 11.5–14.5)
ERYTHROCYTE [DISTWIDTH] IN BLOOD BY AUTOMATED COUNT: 14.9 % (ref 11.5–14.5)
ERYTHROCYTE [SEDIMENTATION RATE] IN BLOOD BY WESTERGREN METHOD: 53 MM/HR (ref 0–20)
EST. GFR  (NO RACE VARIABLE): >60 ML/MIN/1.73 M^2
FLUBV RNA NPH QL NAA+NON-PROBE: NOT DETECTED
FUNGUS SPEC CULT: NORMAL
GLUCOSE SERPL-MCNC: 129 MG/DL (ref 70–110)
GLUCOSE SERPL-MCNC: 159 MG/DL (ref 70–110)
GLUCOSE SERPL-MCNC: 194 MG/DL (ref 70–110)
GLUCOSE SERPL-MCNC: 92 MG/DL (ref 70–110)
GLUCOSE SERPL-MCNC: 99 MG/DL (ref 70–110)
GLUCOSE UR QL STRIP: NEGATIVE
GLUCOSE UR QL STRIP: NEGATIVE
HCT VFR BLD AUTO: 28.3 % (ref 37–48.5)
HCT VFR BLD AUTO: 32.7 % (ref 37–48.5)
HGB BLD-MCNC: 10.7 G/DL (ref 12–16)
HGB BLD-MCNC: 8.8 G/DL (ref 12–16)
HGB UR QL STRIP: NEGATIVE
HGB UR QL STRIP: NEGATIVE
HPIV1 RNA NPH QL NAA+NON-PROBE: NOT DETECTED
HPIV2 RNA NPH QL NAA+NON-PROBE: NOT DETECTED
HPIV3 RNA NPH QL NAA+NON-PROBE: NOT DETECTED
HPIV4 RNA NPH QL NAA+NON-PROBE: NOT DETECTED
HUMAN METAPNEUMOVIRUS: NOT DETECTED
HYALINE CASTS #/AREA URNS LPF: 48 /LPF
IMM GRANULOCYTES # BLD AUTO: ABNORMAL K/UL (ref 0–0.04)
IMM GRANULOCYTES # BLD AUTO: ABNORMAL K/UL (ref 0–0.04)
IMM GRANULOCYTES NFR BLD AUTO: ABNORMAL % (ref 0–0.5)
IMM GRANULOCYTES NFR BLD AUTO: ABNORMAL % (ref 0–0.5)
INFLUENZA A (SUBTYPES H1,H1-2009,H3): NOT DETECTED
KETONES UR QL STRIP: ABNORMAL
KETONES UR QL STRIP: ABNORMAL
LEUKOCYTE ESTERASE UR QL STRIP: ABNORMAL
LEUKOCYTE ESTERASE UR QL STRIP: ABNORMAL
LYMPHOCYTES NFR BLD: 10 % (ref 18–48)
LYMPHOCYTES NFR BLD: 18 % (ref 18–48)
MAGNESIUM SERPL-MCNC: 1.3 MG/DL (ref 1.6–2.6)
MCH RBC QN AUTO: 28.3 PG (ref 27–31)
MCH RBC QN AUTO: 29.2 PG (ref 27–31)
MCHC RBC AUTO-ENTMCNC: 31.1 G/DL (ref 32–36)
MCHC RBC AUTO-ENTMCNC: 32.7 G/DL (ref 32–36)
MCV RBC AUTO: 89 FL (ref 82–98)
MCV RBC AUTO: 91 FL (ref 82–98)
MICROSCOPIC COMMENT: ABNORMAL
MONOCYTES NFR BLD: 0 % (ref 4–15)
MONOCYTES NFR BLD: 6 % (ref 4–15)
MRSA SCREEN BY PCR: NEGATIVE
MYCOPLASMA PNEUMONIAE: NOT DETECTED
NEUTROPHILS NFR BLD: 51 % (ref 38–73)
NEUTROPHILS NFR BLD: 59 % (ref 38–73)
NEUTS BAND NFR BLD MANUAL: 25 %
NEUTS BAND NFR BLD MANUAL: 31 %
NITRITE UR QL STRIP: NEGATIVE
NITRITE UR QL STRIP: NEGATIVE
NRBC BLD-RTO: 0 /100 WBC
NRBC BLD-RTO: 0 /100 WBC
PH UR STRIP: 6 [PH] (ref 5–8)
PH UR STRIP: 6 [PH] (ref 5–8)
PLATELET # BLD AUTO: 204 K/UL (ref 150–450)
PLATELET # BLD AUTO: 273 K/UL (ref 150–450)
PLATELET BLD QL SMEAR: ABNORMAL
PLATELET BLD QL SMEAR: ABNORMAL
PMV BLD AUTO: 9.6 FL (ref 9.2–12.9)
PMV BLD AUTO: 9.6 FL (ref 9.2–12.9)
POTASSIUM SERPL-SCNC: 4.3 MMOL/L (ref 3.5–5.1)
PROT SERPL-MCNC: 5.1 G/DL (ref 6–8.4)
PROT UR QL STRIP: ABNORMAL
PROT UR QL STRIP: ABNORMAL
RBC # BLD AUTO: 3.11 M/UL (ref 4–5.4)
RBC # BLD AUTO: 3.67 M/UL (ref 4–5.4)
RBC #/AREA URNS HPF: 2 /HPF (ref 0–4)
RESPIRATORY INFECTION PANEL SOURCE: NORMAL
RSV RNA NPH QL NAA+NON-PROBE: NOT DETECTED
RV+EV RNA NPH QL NAA+NON-PROBE: NOT DETECTED
SARS-COV-2 RNA RESP QL NAA+PROBE: NOT DETECTED
SODIUM SERPL-SCNC: 138 MMOL/L (ref 136–145)
SP GR UR STRIP: >1.03 (ref 1–1.03)
SP GR UR STRIP: >1.03 (ref 1–1.03)
SQUAMOUS #/AREA URNS HPF: 16 /HPF
TROPONIN I SERPL HS-MCNC: 14.4 PG/ML (ref 0–14.9)
URN SPEC COLLECT METH UR: ABNORMAL
URN SPEC COLLECT METH UR: ABNORMAL
UROBILINOGEN UR STRIP-ACNC: NEGATIVE EU/DL
UROBILINOGEN UR STRIP-ACNC: NEGATIVE EU/DL
WBC # BLD AUTO: 6.02 K/UL (ref 3.9–12.7)
WBC # BLD AUTO: 7.14 K/UL (ref 3.9–12.7)
WBC #/AREA URNS HPF: 9 /HPF (ref 0–5)

## 2023-12-04 PROCEDURE — 99900035 HC TECH TIME PER 15 MIN (STAT)

## 2023-12-04 PROCEDURE — 86140 C-REACTIVE PROTEIN: CPT | Performed by: STUDENT IN AN ORGANIZED HEALTH CARE EDUCATION/TRAINING PROGRAM

## 2023-12-04 PROCEDURE — 83735 ASSAY OF MAGNESIUM: CPT | Performed by: STUDENT IN AN ORGANIZED HEALTH CARE EDUCATION/TRAINING PROGRAM

## 2023-12-04 PROCEDURE — 81001 URINALYSIS AUTO W/SCOPE: CPT | Performed by: STUDENT IN AN ORGANIZED HEALTH CARE EDUCATION/TRAINING PROGRAM

## 2023-12-04 PROCEDURE — 80053 COMPREHEN METABOLIC PANEL: CPT | Performed by: STUDENT IN AN ORGANIZED HEALTH CARE EDUCATION/TRAINING PROGRAM

## 2023-12-04 PROCEDURE — 96361 HYDRATE IV INFUSION ADD-ON: CPT

## 2023-12-04 PROCEDURE — 96372 THER/PROPH/DIAG INJ SC/IM: CPT | Performed by: STUDENT IN AN ORGANIZED HEALTH CARE EDUCATION/TRAINING PROGRAM

## 2023-12-04 PROCEDURE — 25500020 PHARM REV CODE 255: Performed by: STUDENT IN AN ORGANIZED HEALTH CARE EDUCATION/TRAINING PROGRAM

## 2023-12-04 PROCEDURE — 25000003 PHARM REV CODE 250: Performed by: STUDENT IN AN ORGANIZED HEALTH CARE EDUCATION/TRAINING PROGRAM

## 2023-12-04 PROCEDURE — 36415 COLL VENOUS BLD VENIPUNCTURE: CPT | Performed by: STUDENT IN AN ORGANIZED HEALTH CARE EDUCATION/TRAINING PROGRAM

## 2023-12-04 PROCEDURE — 99223 PR INITIAL HOSPITAL CARE,LEVL III: ICD-10-PCS | Mod: ,,, | Performed by: INTERNAL MEDICINE

## 2023-12-04 PROCEDURE — 85651 RBC SED RATE NONAUTOMATED: CPT | Performed by: STUDENT IN AN ORGANIZED HEALTH CARE EDUCATION/TRAINING PROGRAM

## 2023-12-04 PROCEDURE — 84484 ASSAY OF TROPONIN QUANT: CPT | Performed by: STUDENT IN AN ORGANIZED HEALTH CARE EDUCATION/TRAINING PROGRAM

## 2023-12-04 PROCEDURE — 27000221 HC OXYGEN, UP TO 24 HOURS

## 2023-12-04 PROCEDURE — 87641 MR-STAPH DNA AMP PROBE: CPT | Performed by: STUDENT IN AN ORGANIZED HEALTH CARE EDUCATION/TRAINING PROGRAM

## 2023-12-04 PROCEDURE — 94760 N-INVAS EAR/PLS OXIMETRY 1: CPT

## 2023-12-04 PROCEDURE — 85027 COMPLETE CBC AUTOMATED: CPT | Performed by: STUDENT IN AN ORGANIZED HEALTH CARE EDUCATION/TRAINING PROGRAM

## 2023-12-04 PROCEDURE — 99222 PR INITIAL HOSPITAL CARE,LEVL II: ICD-10-PCS | Mod: ,,, | Performed by: INTERNAL MEDICINE

## 2023-12-04 PROCEDURE — 96366 THER/PROPH/DIAG IV INF ADDON: CPT

## 2023-12-04 PROCEDURE — 96365 THER/PROPH/DIAG IV INF INIT: CPT | Mod: 59

## 2023-12-04 PROCEDURE — 63600175 PHARM REV CODE 636 W HCPCS: Performed by: STUDENT IN AN ORGANIZED HEALTH CARE EDUCATION/TRAINING PROGRAM

## 2023-12-04 PROCEDURE — 99900031 HC PATIENT EDUCATION (STAT)

## 2023-12-04 PROCEDURE — 12000002 HC ACUTE/MED SURGE SEMI-PRIVATE ROOM

## 2023-12-04 PROCEDURE — 85007 BL SMEAR W/DIFF WBC COUNT: CPT | Performed by: STUDENT IN AN ORGANIZED HEALTH CARE EDUCATION/TRAINING PROGRAM

## 2023-12-04 PROCEDURE — 94799 UNLISTED PULMONARY SVC/PX: CPT

## 2023-12-04 PROCEDURE — 99222 1ST HOSP IP/OBS MODERATE 55: CPT | Mod: ,,, | Performed by: INTERNAL MEDICINE

## 2023-12-04 PROCEDURE — 87633 RESP VIRUS 12-25 TARGETS: CPT | Performed by: STUDENT IN AN ORGANIZED HEALTH CARE EDUCATION/TRAINING PROGRAM

## 2023-12-04 PROCEDURE — 99223 1ST HOSP IP/OBS HIGH 75: CPT | Mod: ,,, | Performed by: INTERNAL MEDICINE

## 2023-12-04 PROCEDURE — 94761 N-INVAS EAR/PLS OXIMETRY MLT: CPT

## 2023-12-04 RX ORDER — MAGNESIUM SULFATE HEPTAHYDRATE 40 MG/ML
4 INJECTION, SOLUTION INTRAVENOUS ONCE
Status: COMPLETED | OUTPATIENT
Start: 2023-12-04 | End: 2023-12-04

## 2023-12-04 RX ORDER — SODIUM CHLORIDE 0.9 % (FLUSH) 0.9 %
10 SYRINGE (ML) INJECTION
Status: DISCONTINUED | OUTPATIENT
Start: 2023-12-04 | End: 2023-12-06 | Stop reason: HOSPADM

## 2023-12-04 RX ORDER — MUPIROCIN 20 MG/G
OINTMENT TOPICAL 2 TIMES DAILY
Status: DISCONTINUED | OUTPATIENT
Start: 2023-12-04 | End: 2023-12-06 | Stop reason: HOSPADM

## 2023-12-04 RX ADMIN — GABAPENTIN 100 MG: 100 CAPSULE ORAL at 09:12

## 2023-12-04 RX ADMIN — SODIUM CHLORIDE 500 ML: 0.9 INJECTION, SOLUTION INTRAVENOUS at 01:12

## 2023-12-04 RX ADMIN — AZITHROMYCIN DIHYDRATE 500 MG: 500 INJECTION, POWDER, LYOPHILIZED, FOR SOLUTION INTRAVENOUS at 02:12

## 2023-12-04 RX ADMIN — Medication 400 MG: at 08:12

## 2023-12-04 RX ADMIN — METOPROLOL SUCCINATE 25 MG: 25 TABLET, EXTENDED RELEASE ORAL at 08:12

## 2023-12-04 RX ADMIN — FAMOTIDINE 20 MG: 20 TABLET ORAL at 08:12

## 2023-12-04 RX ADMIN — AMIODARONE HYDROCHLORIDE 200 MG: 200 TABLET ORAL at 08:12

## 2023-12-04 RX ADMIN — GABAPENTIN 100 MG: 100 CAPSULE ORAL at 08:12

## 2023-12-04 RX ADMIN — ENOXAPARIN SODIUM 40 MG: 40 INJECTION SUBCUTANEOUS at 05:12

## 2023-12-04 RX ADMIN — Medication 800 MG: at 01:12

## 2023-12-04 RX ADMIN — Medication 800 MG: at 08:12

## 2023-12-04 RX ADMIN — IOHEXOL 100 ML: 350 INJECTION, SOLUTION INTRAVENOUS at 12:12

## 2023-12-04 RX ADMIN — MAGNESIUM SULFATE HEPTAHYDRATE 4 G: 40 INJECTION, SOLUTION INTRAVENOUS at 03:12

## 2023-12-04 RX ADMIN — CLOPIDOGREL BISULFATE 75 MG: 75 TABLET, FILM COATED ORAL at 08:12

## 2023-12-04 RX ADMIN — CEFTRIAXONE SODIUM 1 G: 1 INJECTION, POWDER, FOR SOLUTION INTRAMUSCULAR; INTRAVENOUS at 05:12

## 2023-12-04 RX ADMIN — MUPIROCIN 1 G: 20 OINTMENT TOPICAL at 08:12

## 2023-12-04 RX ADMIN — ASPIRIN 81 MG: 81 TABLET, COATED ORAL at 08:12

## 2023-12-04 NOTE — ASSESSMENT & PLAN NOTE
CTA chest negative for PE   Development of moderate size right pleural effusion with diffuse alveolar opacities in the right upper lobe and patchy groundglass opacities in the left upper lobe and left lung base suggestive of multifocal pneumonia  Improvement of the dense alveolar consolidation in the right lower lobe    Blood cultures no growth.  Respiratory infection panel negative.  MRSA screen negative.  Stop vancomycin.  Continue ceftriaxone.  Start azithromycin.  Obtain sputum culture.  Heme-Onc and pulmonology consulted.  Currently on 1 L nasal cannula.  Wean as tolerated.

## 2023-12-04 NOTE — ED PROVIDER NOTES
Encounter Date: 12/3/2023       History     Chief Complaint   Patient presents with    Fatigue     Pt arrived by ems from home, generalized weakness for 2 days. Recent stent placement      HPI    Patient is an 83-year-old female with past medical history significant for non-insulin-dependent diabetes, hyperlipidemia, hypertension, CAD status post recent stenting complicated by postprocedural cardiac arrest, right lung adenocarcinoma status post right lung lower lobectomy who presents endorsing 2 days of fatigue, generalized weakness, vomiting, and chills.  She denies chest pain, dyspnea, cough, rhinorrhea, sore throat, dysuria, abdominal pain, diarrhea.    Review of patient's allergies indicates:  No Known Allergies  Past Medical History:   Diagnosis Date    Arthritis     Cardiac arrest 10/2023    Cataract     Colon polyp     Diabetes mellitus type II 2012    Encounter for blood transfusion     Extra-ovarian endometrioid adenocarcinoma 2020    GERD (gastroesophageal reflux disease)     History of chronic cough     clear productive    Hyperlipidemia     Hypertension     Macular degeneration     Ovarian cancer     PONV (postoperative nausea and vomiting)     Squamous cell carcinoma 1980's    precancer of cervix     Past Surgical History:   Procedure Laterality Date    AUGMENTATION OF BREAST      BRONCHOSCOPY WITH FLUOROSCOPY Right 05/11/2023    Procedure: BRONCHOSCOPY, WITH FLUOROSCOPY;  Surgeon: Neva Christian MD;  Location: [facility];  Service: Pulmonary;  Laterality: Right;    CATARACT EXTRACTION BILATERAL W/ ANTERIOR VITRECTOMY Bilateral     CHOLECYSTECTOMY      COLONOSCOPY      COLONOSCOPY N/A 04/20/2023    Procedure: COLONOSCOPY;  Surgeon: Sabino Stephenson MD;  Location: [facility];  Service: Endoscopy;  Laterality: N/A;    CORONARY STENT PLACEMENT  10/2023    x 3    ESOPHAGOGASTRODUODENOSCOPY N/A 06/20/2018    Procedure: EGD (ESOPHAGOGASTRODUODENOSCOPY);  Surgeon: Sabino Stephenson MD;  Location: [facility];   Service: Endoscopy;  Laterality: N/A;    ESOPHAGOGASTRODUODENOSCOPY N/A 03/31/2023    Procedure: EGD (ESOPHAGOGASTRODUODENOSCOPY);  Surgeon: Sabino Stephenson MD;  Location: [facility];  Service: Endoscopy;  Laterality: N/A;    HYSTERECTOMY  1976    partial    INJECTION OF ANESTHETIC AGENT AROUND MEDIAL BRANCH NERVES INNERVATING LUMBAR FACET JOINT Right 05/10/2023    Procedure: Block-nerve-Lateral-branch-lumbar;  Surgeon: Agustin Robles MD;  Location: [facility];  Service: Pain Management;  Laterality: Right;  L5 and s1,s2 LBB    INJECTION OF ANESTHETIC AGENT AROUND MEDIAL BRANCH NERVES INNERVATING LUMBAR FACET JOINT Right 05/30/2023    Procedure: Block-nerve-medial branch-lumbar;  Surgeon: Agustin Robles MD;  Location: [facility];  Service: Pain Management;  Laterality: Right;  L5, s1 ,s2 LBB #2    INJECTION OF ANESTHETIC AGENT AROUND NERVE Right 10/31/2023    Procedure: INTERCOSTAL NERVE BLOCK;  Surgeon: Washington Shankar MD;  Location: [facility];  Service: Cardiothoracic;  Laterality: Right;    INJECTION, SACROILIAC JOINT Right 01/26/2023    Procedure: INJECTION,SACROILIAC JOINT;  Surgeon: Agustin Robles MD;  Location: [facility];  Service: Pain Management;  Laterality: Right;    INSERTION OF TUNNELED CENTRAL VENOUS CATHETER (CVC) WITH SUBCUTANEOUS PORT Right 10/19/2020    Procedure: INSERTION, PORT-A-CATH;  Surgeon: Misti Mcfarland MD;  Location: [facility] OR;  Service: General;  Laterality: Right;    INTRAMEDULLARY RODDING OF TROCHANTER OF FEMUR Right 11/28/2021    Procedure: INSERTION, INTRAMEDULLARY LUC, FEMUR, TROCHANTER/RIGHT TFN DR FAJARDO NOTIFIED REP;  Surgeon: Kp Fajardo MD;  Location: [facility] OR;  Service: Orthopedics;  Laterality: Right;  SKIP    ROBOT-ASSISTED LAPAROSCOPIC LYMPHADENECTOMY USING DA NELLA XI N/A 09/21/2020    Procedure: XI ROBOTIC LYMPHADENECTOMY-pelvic and kell-aortic;  Surgeon: Altagracia Ray MD;  Location: [facility];  Service: OB/GYN;  Laterality: N/A;    ROBOT-ASSISTED LAPAROSCOPIC OMENTECTOMY  USING DA NELLA XI N/A 2020    Procedure: XI ROBOTIC OMENTECTOMY;  Surgeon: Altagracia Ray MD;  Location: [facility] OR;  Service: OB/GYN;  Laterality: N/A;    ROBOT-ASSISTED LAPAROSCOPIC SALPINGO-OOPHORECTOMY USING DA NELLA XI Bilateral 2020    Procedure: XI ROBOTIC SALPINGO-OOPHORECTOMY;  Surgeon: Altagracia Ray MD;  Location: [facility] OR;  Service: OB/GYN;  Laterality: Bilateral;    THORACOSCOPIC BIOPSY OF PLEURA Right 10/31/2023    Procedure: VATS, WITH PLEURA BIOPSY;  Surgeon: Washington Shankar MD;  Location: [facility] OR;  Service: Cardiothoracic;  Laterality: Right;  FROZEN SECTION   **KRISTEN-ASSISDT**    THORACOTOMY Right 10/31/2023    Procedure: THORACOTOMY;  Surgeon: Washington Shankar MD;  Location: [facility] OR;  Service: Cardiothoracic;  Laterality: Right;    UPPER GASTROINTESTINAL ENDOSCOPY       Family History   Problem Relation Age of Onset    Cancer Mother 57        lung    Cancer Father 56        oral    Skin cancer Sister     Skin cancer Brother     Melanoma Brother     Skin cancer Brother     Breast cancer Maternal Grandmother     Breast cancer Paternal Grandmother     Colon polyps Daughter     Psoriasis Neg Hx     Lupus Neg Hx     Eczema Neg Hx     Colon cancer Neg Hx     Crohn's disease Neg Hx     Esophageal cancer Neg Hx     Stomach cancer Neg Hx     Ulcerative colitis Neg Hx      Social History     Tobacco Use    Smoking status: Former     Current packs/day: 0.00     Average packs/day: 1 pack/day for 20.0 years (20.0 ttl pk-yrs)     Types: Cigarettes     Start date:      Quit date: 1981     Years since quittin.9    Smokeless tobacco: Never    Tobacco comments:     quit 40 yrs ago   Substance Use Topics    Alcohol use: Yes     Alcohol/week: 1.0 standard drink of alcohol     Types: 1 Glasses of wine per week     Comment: occasional    Drug use: No     Review of Systems  As noted in HPI     Physical Exam     Initial Vitals [23 1834]   BP Pulse Resp Temp SpO2   (!) 151/73 (!) 119 16  (!) 100.5 °F (38.1 °C) (!) 89 %      MAP       --         Physical Exam    Vitals reviewed.  Constitutional: She appears well-developed and well-nourished.   Eyes: Conjunctivae are normal.   Neck: Neck supple.   Cardiovascular:            Tachycardic with a regular rate, well-perfused with 2+ distal pulses   Pulmonary/Chest: No respiratory distress. She has no wheezes. She has no rhonchi. She has rales (Mild, diffuse).   Abdominal: Abdomen is soft. There is no abdominal tenderness.   Musculoskeletal:         General: No edema.      Cervical back: Neck supple.     Neurological: She is alert.   Skin: Skin is warm and dry.         ED Course   Procedures  Labs Reviewed   B-TYPE NATRIURETIC PEPTIDE - Abnormal; Notable for the following components:       Result Value     (*)     All other components within normal limits   CBC W/ AUTO DIFFERENTIAL - Abnormal; Notable for the following components:    RBC 3.67 (*)     Hemoglobin 10.7 (*)     Hematocrit 32.7 (*)     RDW 14.9 (*)     All other components within normal limits   COMPREHENSIVE METABOLIC PANEL - Abnormal; Notable for the following components:    CO2 22 (*)     Glucose 123 (*)     BUN 24 (*)     Albumin 3.4 (*)     Alkaline Phosphatase 45 (*)     eGFR 56.2 (*)     All other components within normal limits   MAGNESIUM - Abnormal; Notable for the following components:    Magnesium 1.3 (*)     All other components within normal limits   PROTIME-INR - Abnormal; Notable for the following components:    Prothrombin Time 14.3 (*)     INR 1.3 (*)     All other components within normal limits   TROPONIN I HIGH SENSITIVITY - Abnormal; Notable for the following components:    Troponin I High Sensitivity 16.6 (*)     All other components within normal limits   CULTURE, BLOOD   CULTURE, BLOOD   LIPASE   TSH   LACTIC ACID, PLASMA   SARS-COV-2 RNA AMPLIFICATION, QUAL   INFLUENZA A AND B ANTIGEN    Narrative:     Specimen Source->Nasopharyngeal Swab   URINALYSIS, REFLEX TO  URINE CULTURE          Imaging Results              X-Ray Chest AP Portable (Final result)  Result time 12/03/23 19:33:18      Final result by Marcella Kwon Jr., MD (12/03/23 19:33:18)                   Narrative:    EXAMINATION:  XR CHEST AP PORTABLE. 1 view    CLINICAL INDICATION:  Female, 83 years old. Follow-up pneumothorax    COMPARISON:  November 15, 2023    FINDINGS:  The heart is of normal size. Great vessel silhouette is unremarkable.    Left lung remains clear. Diffuse infiltrative changes in the right lung and right pleural effusion persist. The pleural drainage catheter projects over the right upper chest. The small right apical pneumothorax is unchanged.  Calcified breast implants again noted    IMPRESSION: No change since November 15, 2023. Small right apical pneumothorax persists. Diffuse infiltration in the right lung and right pleural effusion persist.    Electronically signed by:  Marcella Kwon MD  12/03/2023 07:33 PM CST Workstation: [id number]                                     Medications   vancomycin - pharmacy to dose (has no administration in time range)   cefepime 2 g in dextrose 5% 100 mL IVPB (ready to mix) (0 g Intravenous Stopped 12/3/23 1933)   lactated ringers bolus 1,000 mL (1,000 mLs Intravenous New Bag 12/3/23 1903)   vancomycin 1.25 g in dextrose 5% 250 mL IVPB (ready to mix) (1,250 mg Intravenous New Bag 12/3/23 1939)     Medical Decision Making  Amount and/or Complexity of Data Reviewed  Labs: ordered.  Radiology: ordered.    Risk  Decision regarding hospitalization.    Patient is an 83-year-old female with past medical history significant for non-insulin-dependent diabetes, hyperlipidemia, hypertension, CAD status post recent stenting complicated by postprocedural cardiac arrest, right lung adenocarcinoma status post right lung lower lobectomy who presents endorsing 2 days of fatigue, generalized weakness, vomiting, and chills.  Arrives with a temperature of a 100.5°  and tachycardic to 110s, normotensive, sats in upper 80s improved on 1 L nasal cannula.  On exam she is fatigued but awake and alert, conversant, in no acute distress.  She has mild rales throughout all lung fields with even nonlabored respirations.  Though she is septic per her vital signs, 30 cc/kg IV fluids deferred given mild rales on exam and history of recent cardiac arrest, instead treated with 1 L LR with resolution of tachycardia and improvement in her symptoms.  On her labs she is negative for flu and COVID.  Her EKG shows sinus tachycardia at a rate of 112, no ST segment elevations or depressions, no T-wave inversions.  Chest x-ray shows a persistent small right apical pneumothorax and diffuse right lung infiltrates and pleural effusion, stable from prior.  Her labs are notable for no leukocytosis, stable anemia with hemoglobin 10.7, normal platelets.  Troponin mildly elevated to 16.6, BNP elevated at 665.  INR 1.3.  TSH within normal limits.  CMP is without significant abnormality, bicarb mildly decreased at 22, creatinine 1.0, normal LFTs, lipase within normal limits at 8.  Covered broadly with cefepime and vancomycin.  Blood culture sent.  Consulted to medicine for admission for sepsis of yet unknown origin.    Aurea Carrillo MD  [employer] EM PGY3  12/3/2023 21:09                                    Clinical Impression:  Final diagnoses:  [R00.0] Tachycardia  [R50.9] Fever  [A41.9] Sepsis, due to unspecified organism, unspecified whether acute organ dysfunction present (Primary)          ED Disposition Condition    Admit Stable                Aurea Carrillo MD  Resident  12/03/23 5147

## 2023-12-04 NOTE — ASSESSMENT & PLAN NOTE
History of VFib arrest on 10/13/2023, currently on amiodarone    On dual antiplatelet therapy for CAD.

## 2023-12-04 NOTE — PLAN OF CARE
[facility]  Initial Discharge Assessment       Primary Care Provider: Idalia Greco MD    Admission Diagnosis: Sepsis, due to unspecified organism, unspecified whether acute organ dysfunction present [A41.9]    Admission Date: 12/3/2023  Expected Discharge Date: 12/5/2023    met with Pt at bedside to complete discharge assessment. Pt AAOx4s. Demographics, PCP, and insurance verified. No home health. No dialysis. Pt reports ability to complete ADLs without assistance. Pt verbalized plan to discharge home via family transport. Pt has no other needs to be addressed at this time.    Transition of Care Barriers: None    Payor: [employer] MEDICARE / Plan: [employer] MEDICARE HMO / Product Type: Capitation /     Extended Emergency Contact Information  Primary Emergency Contact: TRISTON LE  Mobile Phone: [phone]  Relation: Daughter  Secondary Emergency Contact: Porter Otero  Address: [address] [place] [address] [facility]  Home Phone: [phone]  Mobile Phone: [phone]  Relation: Spouse  Preferred language: English   needed? No    Discharge Plan A: Home with family  Discharge Plan B: Home with family      [id number] - [place], [place] - [address] [facility]  [address]  [facility] [address]  Phone: [phone] Fax: [phone]      Initial Assessment (most recent)       Adult Discharge Assessment - 12/04/23 1131          Discharge Assessment    Assessment Type Discharge Planning Assessment     Confirmed/corrected address, phone number and insurance Yes     Confirmed Demographics Correct on Facesheet     Source of Information patient     Does patient/caregiver understand observation status Yes     Communicated ALYCIA with patient/caregiver Date not available/Unable to determine     Reason For Admission Febrile     People in Home spouse;sibling(s)     Do you expect to return to your current  living situation? Yes     Do you have help at home or someone to help you manage your care at home? Yes     Who are your caregiver(s) and their phone number(s)? TRISTON LE (Daughter) [phone]     Prior to hospitilization cognitive status: Alert/Oriented     Current cognitive status: Alert/Oriented     Home Layout Able to live on 1st floor     Readmission within 30 days? No     Patient currently being followed by outpatient case management? No     Do you currently have service(s) that help you manage your care at home? No     Do you take prescription medications? Yes     Do you have prescription coverage? Yes     Coverage TRISTON LE (Daughter) [phone]     Do you have any problems affording any of your prescribed medications? No     Is the patient taking medications as prescribed? no     Who is going to help you get home at discharge? TRISTON LE (Daughter) [phone]     How do you get to doctors appointments? car, drives self     Are you on dialysis? No     Do you take coumadin? No     DME Needed Upon Discharge  none     Discharge Plan discussed with: Patient     Transition of Care Barriers None     Discharge Plan A Home with family     Discharge Plan B Home with family

## 2023-12-04 NOTE — PROGRESS NOTES
Patient seen and examined.  CTA pending.  Admitted with fatigue.  Recent surgery for lung cancer.  Full consult to follow.

## 2023-12-04 NOTE — CARE UPDATE
12/04/23 0807   Patient Assessment/Suction   Respiratory Effort Unlabored   Expansion/Accessory Muscles/Retractions no retractions;expansion symmetric;no use of accessory muscles   All Lung Fields Breath Sounds Anterior:;Lateral:;diminished;clear   Rhythm/Pattern, Respiratory depth regular;pattern regular;unlabored   PRE-TX-O2   Device (Oxygen Therapy) nasal cannula   $ Is the patient on Low Flow Oxygen? Yes   Flow (L/min) 1   SpO2 97 %   Pulse Oximetry Type Intermittent   $ Pulse Oximetry - Multiple Charge Pulse Oximetry - Multiple   Pulse 100   Resp 18   Education   $ Education 15 min   Respiratory Evaluation   $ Care Plan Tech Time 15 min

## 2023-12-04 NOTE — H&P
[facility] - Emergency Dept  Hospital Medicine  History & Physical    Patient Name: Alexa Otero  MRN: [id number]  Patient Class: OP- Observation  Admission Date: 12/3/2023  Attending Physician: Krishna Garcia MD   Primary Care Provider: Idalia Greco MD         Patient information was obtained from patient and ER records.     Subjective:     Principal Problem:Febrile    Chief Complaint:   Chief Complaint   Patient presents with    Fatigue     Pt arrived by ems from home, generalized weakness for 2 days. Recent stent placement         HPI: Patient is an 83-year-old female with history of CAD status post PCI, VFib arrest, type 2 diabetes, hyperlipidemia, hypertension, right lobe adenocarcinoma status post lobectomy who presents to the emergency room for 2 days of fatigue, generalized weakness, vomiting.  Patient reports sudden onset of symptoms.  Denies any sick contacts.  Reports diarrhea although it is resolved today.  Patient reports not started chemotherapy yet.  Denies chest pain, shortness for breath, abdominal pain.  Temp on admission was 100.5, oxygen saturation was 89% patient was placed on 1 L nasal cannula with improvement.  Chest x-ray showed diffuse infiltrative changes in the right lung and right pleural effusion that is unchanged from prior chest x-ray.  Troponin 16.6.  Patient being admitted for sepsis of unknown origin.  Urinalysis pending.  Given cefepime and vancomycin in the emergency room.        Past Medical History:   Diagnosis Date    Arthritis     Cardiac arrest 10/2023    Cataract     Colon polyp     Diabetes mellitus type II 2012    Encounter for blood transfusion     Extra-ovarian endometrioid adenocarcinoma 2020    GERD (gastroesophageal reflux disease)     History of chronic cough     clear productive    Hyperlipidemia     Hypertension     Macular degeneration     Ovarian cancer     PONV (postoperative nausea and vomiting)     Squamous cell carcinoma 1980's    precancer  of cervix       Past Surgical History:   Procedure Laterality Date    AUGMENTATION OF BREAST      BRONCHOSCOPY WITH FLUOROSCOPY Right 05/11/2023    Procedure: BRONCHOSCOPY, WITH FLUOROSCOPY;  Surgeon: Neva Christian MD;  Location: [facility] ENDO;  Service: Pulmonary;  Laterality: Right;    CATARACT EXTRACTION BILATERAL W/ ANTERIOR VITRECTOMY Bilateral     CHOLECYSTECTOMY      COLONOSCOPY      COLONOSCOPY N/A 04/20/2023    Procedure: COLONOSCOPY;  Surgeon: Sabino Stephenson MD;  Location: [facility] ENDO;  Service: Endoscopy;  Laterality: N/A;    CORONARY STENT PLACEMENT  10/2023    x 3    ESOPHAGOGASTRODUODENOSCOPY N/A 06/20/2018    Procedure: EGD (ESOPHAGOGASTRODUODENOSCOPY);  Surgeon: Sabino Stephenson MD;  Location: [facility] ENDO;  Service: Endoscopy;  Laterality: N/A;    ESOPHAGOGASTRODUODENOSCOPY N/A 03/31/2023    Procedure: EGD (ESOPHAGOGASTRODUODENOSCOPY);  Surgeon: Sabino Stephenson MD;  Location: [facility] ENDO;  Service: Endoscopy;  Laterality: N/A;    HYSTERECTOMY  1976    partial    INJECTION OF ANESTHETIC AGENT AROUND MEDIAL BRANCH NERVES INNERVATING LUMBAR FACET JOINT Right 05/10/2023    Procedure: Block-nerve-Lateral-branch-lumbar;  Surgeon: Agustin Robles MD;  Location: [facility] OR;  Service: Pain Management;  Laterality: Right;  L5 and s1,s2 LBB    INJECTION OF ANESTHETIC AGENT AROUND MEDIAL BRANCH NERVES INNERVATING LUMBAR FACET JOINT Right 05/30/2023    Procedure: Block-nerve-medial branch-lumbar;  Surgeon: Agustin Robles MD;  Location: [facility] OR;  Service: Pain Management;  Laterality: Right;  L5, s1 ,s2 LBB #2    INJECTION OF ANESTHETIC AGENT AROUND NERVE Right 10/31/2023    Procedure: INTERCOSTAL NERVE BLOCK;  Surgeon: Washington Shankar MD;  Location: [facility] OR;  Service: Cardiothoracic;  Laterality: Right;    INJECTION, SACROILIAC JOINT Right 01/26/2023    Procedure: INJECTION,SACROILIAC JOINT;  Surgeon: Agustin Robles MD;  Location: [facility] OR;  Service: Pain Management;  Laterality: Right;    INSERTION OF TUNNELED CENTRAL  VENOUS CATHETER (CVC) WITH SUBCUTANEOUS PORT Right 10/19/2020    Procedure: INSERTION, PORT-A-CATH;  Surgeon: Misti Mcfarland MD;  Location: [facility] OR;  Service: General;  Laterality: Right;    INTRAMEDULLARY RODDING OF TROCHANTER OF FEMUR Right 11/28/2021    Procedure: INSERTION, INTRAMEDULLARY LUC, FEMUR, TROCHANTER/RIGHT TFN DR FAJARDO NOTIFIED REP;  Surgeon: Kp Fajardo MD;  Location: [facility] OR;  Service: Orthopedics;  Laterality: Right;  SKIP    ROBOT-ASSISTED LAPAROSCOPIC LYMPHADENECTOMY USING DA NELLA XI N/A 09/21/2020    Procedure: XI ROBOTIC LYMPHADENECTOMY-pelvic and kell-aortic;  Surgeon: Altagracia Ray MD;  Location: [facility] OR;  Service: OB/GYN;  Laterality: N/A;    ROBOT-ASSISTED LAPAROSCOPIC OMENTECTOMY USING DA NELLA XI N/A 09/21/2020    Procedure: XI ROBOTIC OMENTECTOMY;  Surgeon: Altagracia Ray MD;  Location: [facility] OR;  Service: OB/GYN;  Laterality: N/A;    ROBOT-ASSISTED LAPAROSCOPIC SALPINGO-OOPHORECTOMY USING DA NELLA XI Bilateral 09/21/2020    Procedure: XI ROBOTIC SALPINGO-OOPHORECTOMY;  Surgeon: Altagracia Ray MD;  Location: [facility] OR;  Service: OB/GYN;  Laterality: Bilateral;    THORACOSCOPIC BIOPSY OF PLEURA Right 10/31/2023    Procedure: VATS, WITH PLEURA BIOPSY;  Surgeon: Washington Shankar MD;  Location: [facility];  Service: Cardiothoracic;  Laterality: Right;  FROZEN SECTION   **KRISTEN-ASSISDT**    THORACOTOMY Right 10/31/2023    Procedure: THORACOTOMY;  Surgeon: Washington Shankar MD;  Location: [facility] OR;  Service: Cardiothoracic;  Laterality: Right;    UPPER GASTROINTESTINAL ENDOSCOPY         Review of patient's allergies indicates:  No Known Allergies    No current facility-administered medications on file prior to encounter.     Current Outpatient Medications on File Prior to Encounter   Medication Sig    amiodarone (PACERONE) 200 MG Tab Take 1 tablet (200 mg total) by mouth 2 (two) times daily.    aspirin (ECOTRIN) 81 MG EC tablet Take 81 mg by mouth once daily.     famotidine (PEPCID) 20 MG tablet Take 1 tablet (20 mg total) by mouth once daily. for 30 doses    gabapentin (NEURONTIN) 100 MG capsule TAKE 1 CAPSULE(100 MG) BY MOUTH TWICE DAILY (Patient taking differently: Take 100 mg by mouth 2 (two) times daily.)    magnesium oxide (MAG-OX) 400 mg (241.3 mg magnesium) tablet Take 400 mg by mouth once daily.    melatonin 5 mg Chew Take 2 tablets by mouth nightly as needed (insomnia).    metFORMIN (GLUCOPHAGE) 500 MG tablet TAKE 1 TABLET(500 MG) BY MOUTH TWICE DAILY WITH MEALS (Patient taking differently: Take 500 mg by mouth 2 (two) times a day.)    metoprolol succinate (TOPROL-XL) 25 MG 24 hr tablet Take 25 mg by mouth once daily.    VIT A/VIT C/VIT E/ZINC/COPPER (ICAPS AREDS ORAL) Take 1 capsule by mouth 2 (two) times a day.     clopidogreL (PLAVIX) 75 mg tablet Take 75 mg by mouth once daily.    HYDROcodone-acetaminophen (NORCO) 5-325 mg per tablet Take 1 tablet by mouth every 6 (six) hours as needed for Pain.     Family History       Problem Relation (Age of Onset)    Breast cancer Maternal Grandmother, Paternal Grandmother    Cancer Mother (57), Father (56)    Colon polyps Daughter    Melanoma Brother    Skin cancer Sister, Brother, Brother          Tobacco Use    Smoking status: Former     Current packs/day: 0.00     Average packs/day: 1 pack/day for 20.0 years (20.0 ttl pk-yrs)     Types: Cigarettes     Start date:      Quit date:      Years since quittin.9    Smokeless tobacco: Never    Tobacco comments:     quit 40 yrs ago   Substance and Sexual Activity    Alcohol use: Yes     Alcohol/week: 1.0 standard drink of alcohol     Types: 1 Glasses of wine per week     Comment: occasional    Drug use: No    Sexual activity: Not Currently     Partners: Male     Review of Systems   Constitutional:  Positive for chills and fatigue.   HENT:  Negative for congestion and ear pain.    Respiratory:  Negative for chest tightness and shortness of breath.    Cardiovascular:   Negative for chest pain and palpitations.   Gastrointestinal:  Positive for diarrhea and nausea. Negative for abdominal distention and abdominal pain.   Genitourinary:  Negative for difficulty urinating and dysuria.   Musculoskeletal:  Negative for arthralgias and back pain.   Neurological:  Negative for dizziness.   Psychiatric/Behavioral:  Negative for agitation and behavioral problems.      Objective:     Vital Signs (Most Recent):  Temp: 98.9 °F (37.2 °C) (12/03/23 2200)  Pulse: 93 (12/03/23 2200)  Resp: (!) 23 (12/03/23 2200)  BP: (!) 100/55 (12/03/23 2200)  SpO2: 95 % (12/03/23 2200) Vital Signs (24h Range):  Temp:  [98.9 °F (37.2 °C)-100.5 °F (38.1 °C)] 98.9 °F (37.2 °C)  Pulse:  [] 93  Resp:  [16-26] 23  SpO2:  [89 %-97 %] 95 %  BP: ()/(50-73) 100/55     Weight: 53.1 kg (117 lb)  Body mass index is 20.73 kg/m².     Physical Exam  Vitals reviewed.   Constitutional:       Appearance: She is ill-appearing.   HENT:      Head: Normocephalic and atraumatic.      Right Ear: External ear normal.      Left Ear: External ear normal.      Mouth/Throat:      Mouth: Mucous membranes are dry.   Cardiovascular:      Rate and Rhythm: Regular rhythm. Tachycardia present.   Pulmonary:      Breath sounds: Rhonchi present.   Chest:      Chest wall: No tenderness.   Abdominal:      General: There is no distension.      Tenderness: There is no abdominal tenderness.   Musculoskeletal:         General: Normal range of motion.   Skin:     General: Skin is warm and dry.   Neurological:      General: No focal deficit present.      Mental Status: She is alert and oriented to person, place, and time.                Significant Labs: All pertinent labs within the past 24 hours have been reviewed.  CBC:   Recent Labs   Lab 12/03/23  1856   WBC 7.14   HGB 10.7*   HCT 32.7*        CMP:   Recent Labs   Lab 12/03/23  1856      K 3.5      CO2 22*   *   BUN 24*   CREATININE 1.0   CALCIUM 8.8   PROT 6.2    ALBUMIN 3.4*   BILITOT 0.5   ALKPHOS 45*   AST 13   ALT 12   ANIONGAP 11       Significant Imaging: I have reviewed all pertinent imaging results/findings within the past 24 hours.  Assessment/Plan:     * Febrile  Questionable sepsis, no obvious source at this time.  Infiltrative changes on x-ray but this is unchanged from a month ago.  Follow up urinalysis, blood cultures  Lactic 1.2   Follow up ESR CRP  Tylenol p.r.n.  Continue empiric antibiotics    Elevated troponin  Likely secondary to demand, follow up repeat troponin      Malignant neoplasm of lower lobe of right lung  Follows with Dr. Lloyd outpatient, status post right lower lobe lobectomy.  Has not started chemotherapy yet.      Cardiopulmonary arrest  History of VFib arrest on 10/13/2023, currently on amiodarone    On dual antiplatelet therapy for CAD.    Controlled type 2 diabetes mellitus with stage 3 chronic kidney disease, without long-term current use of insulin  Creatine stable for now. BMP reviewed- noted Estimated Creatinine Clearance: 35.9 mL/min (based on SCr of 1 mg/dL). according to latest data. Based on current GFR, CKD stage is stage 3 - GFR 30-59.  Monitor UOP and serial BMP and adjust therapy as needed. Renally dose meds. Avoid nephrotoxic medications and procedures.    Patient given fluid bolus in the emergency room.  We will start on IV fluids gentle.  Continue with insulin sliding scale.  Home oral medications held.      VTE Risk Mitigation (From admission, onward)           Ordered     enoxaparin injection 40 mg  Daily         12/03/23 2138     IP VTE HIGH RISK PATIENT  Once         12/03/23 2137     Place sequential compression device  Until discontinued         12/03/23 2137                       On 12/03/2023, patient should be placed in hospital observation services under my care.             Krishna Garcia MD  Department of Hospital Medicine  [facility] - Emergency Dept

## 2023-12-04 NOTE — ASSESSMENT & PLAN NOTE
Creatine stable for now. BMP reviewed- noted Estimated Creatinine Clearance: 35.9 mL/min (based on SCr of 1 mg/dL). according to latest data. Based on current GFR, CKD stage is stage 3 - GFR 30-59.  Monitor UOP and serial BMP and adjust therapy as needed. Renally dose meds. Avoid nephrotoxic medications and procedures.    Patient given fluid bolus in the emergency room.  We will start on IV fluids gentle.  Continue with insulin sliding scale.  Home oral medications held.

## 2023-12-04 NOTE — ASSESSMENT & PLAN NOTE
Follows with Dr. Lloyd outpatient, status post right lower lobe lobectomy.  Has not started chemotherapy yet.

## 2023-12-04 NOTE — PLAN OF CARE
12/04/23 1130   ALBRECHT Message   Medicare Outpatient and Observation Notification regarding financial responsibility Given to patient/caregiver;Explained to patient/caregiver;Signed/date by patient/caregiver   Date ALBRECHT was signed 12/04/23   Time ALBRECHT was signed 1007     Pt was explained ALBRECHT. Pt verbalized understanding of ALBRECHT and signed. ALBRECHT scanned to .

## 2023-12-04 NOTE — ASSESSMENT & PLAN NOTE
Follows with Dr. Lloyd outpatient, status post right lower lobe lobectomy.  Has not started chemotherapy yet.  Heme-Onc consulted

## 2023-12-04 NOTE — ASSESSMENT & PLAN NOTE
Questionable sepsis, no obvious source at this time.  Infiltrative changes on x-ray but this is unchanged from a month ago.  Follow up urinalysis, blood cultures  Lactic 1.2   Follow up ESR CRP  Tylenol p.r.n.  Continue empiric antibiotics

## 2023-12-04 NOTE — SUBJECTIVE & OBJECTIVE
Interval History:  Patient does report feeling better today.  Is on 1 L nasal cannula.  CTA negative PE.  Does show increasing pleural effusion and pneumonia.  Heme-Onc and pulmonology consulted.    Review of Systems   Constitutional:  Positive for chills and fatigue.   HENT:  Negative for congestion and ear pain.    Respiratory:  Negative for chest tightness and shortness of breath.    Cardiovascular:  Negative for chest pain and palpitations.   Gastrointestinal:  Positive for diarrhea and nausea. Negative for abdominal distention and abdominal pain.   Genitourinary:  Negative for difficulty urinating and dysuria.   Musculoskeletal:  Negative for arthralgias and back pain.   Neurological:  Negative for dizziness.   Psychiatric/Behavioral:  Negative for agitation and behavioral problems.      Objective:     Vital Signs (Most Recent):  Temp: 98.2 °F (36.8 °C) (12/04/23 1030)  Pulse: 88 (12/04/23 1030)  Resp: 18 (12/04/23 1030)  BP: 96/60 (12/04/23 1030)  SpO2: 96 % (12/04/23 1030) Vital Signs (24h Range):  Temp:  [97.5 °F (36.4 °C)-100.5 °F (38.1 °C)] 98.2 °F (36.8 °C)  Pulse:  [] 88  Resp:  [16-26] 18  SpO2:  [89 %-98 %] 96 %  BP: ()/(47-73) 96/60     Weight: 53.3 kg (117 lb 9.6 oz)  Body mass index is 20.83 kg/m².    Intake/Output Summary (Last 24 hours) at 12/4/2023 1341  Last data filed at 12/3/2023 2148  Gross per 24 hour   Intake 1250 ml   Output --   Net 1250 ml         Physical Exam  Vitals reviewed.   Constitutional:       Appearance: She is ill-appearing.   HENT:      Head: Normocephalic and atraumatic.      Right Ear: External ear normal.      Left Ear: External ear normal.      Mouth/Throat:      Mouth: Mucous membranes are dry.   Cardiovascular:      Rate and Rhythm: Regular rhythm. Tachycardia present.   Pulmonary:      Breath sounds: Rhonchi present.   Chest:      Chest wall: No tenderness.   Abdominal:      General: There is no distension.      Tenderness: There is no abdominal tenderness.    Musculoskeletal:         General: Normal range of motion.   Skin:     General: Skin is warm and dry.   Neurological:      General: No focal deficit present.      Mental Status: She is alert and oriented to person, place, and time.             Significant Labs: All pertinent labs within the past 24 hours have been reviewed.  Recent Lab Results  (Last 5 results in the past 24 hours)        12/04/23  1051   12/04/23  1029   12/04/23  0758   12/04/23  0711   12/04/23  0303        Respiratory Infection Panel Source     NP swab           Adeno Test     Not Detected           Coronavirus 229E, Common Cold Virus     Not Detected           Coronavirus HKU1, Common Cold Virus     Not Detected           Coronavirus NL63, Common Cold Virus     Not Detected           Coronavirus OC43, Common Cold Virus     Not Detected  Comment: Coronavirus strains 229E, HKU1, NL63, and OC43 can cause the common   cold   and are not associated with the respiratory disease outbreak caused   by  the COVID-19 (SARS-CoV-2 novel Coronavirus) strain.              Human Metapneumovirus     Not Detected           Human Rhinovirus/Enterovirus     Not Detected           Influenza A (subtypes H1, H1-2009,H3)     Not Detected           Influenza B     Not Detected           Parainfluenza Virus 1     Not Detected           Parainfluenza Virus 2     Not Detected           Parainfluenza Virus 3     Not Detected           Parainfluenza Virus 4     Not Detected           Respiratory Syncytial Virus     Not Detected           Bordetella Parapertussis (HV5565)     Not Detected           Bordetella pertussis (ptxP)     Not Detected           Chlamydia pneumoniae     Not Detected           Mycoplasma pneumoniae     Not Detected  Comment: Respiratory Infection Panel testing performed by Multiplex PCR.           Albumin         2.8       ALP         36       ALT         9       Anion Gap         7       Aniso         Slight       AST         10       Bands          31.0       Basophil %         0.0       BILIRUBIN TOTAL         0.5  Comment: For infants and newborns, interpretation of results should be based  on gestational age, weight and in agreement with clinical  observations.    Premature Infant recommended reference ranges:  Up to 24 hours.............<8.0 mg/dL  Up to 48 hours............<12.0 mg/dL  3-5 days..................<15.0 mg/dL  6-29 days.................<15.0 mg/dL         BUN         27       Calcium         8.0       Chloride         105       CO2         26       Creatinine         0.9       Differential Method         Manual  Comment: CORRECTED RESULT; previously reported as Automated on 12/04/2023 at   05:16.    [C]       eGFR         >60.0       Eosinophil %         0.0       Glucose         99       Gran %         51.0       Hematocrit         28.3       Hemoglobin         8.8       Immature Grans (Abs)         CANCELED  Comment: Mild elevation in immature granulocytes is non specific and   can be seen in a variety of conditions including stress response,   acute inflammation, trauma and pregnancy. Correlation with other   laboratory and clinical findings is essential.    Result canceled by the ancillary.         Immature Granulocytes         CANCELED  Comment: Result canceled by the ancillary.       Lymph %         18.0       Magnesium          1.3       MCH         28.3       MCHC         31.1       MCV         91       Mono %         0.0       MPV         9.6       MRSA SCREEN BY PCR Negative               nRBC         0       Platelet Estimate         Appears normal       Platelet Count         204       POC Glucose   129     92         Potassium         4.3       PROTEIN TOTAL         5.1       RBC         3.11       RDW         14.8       SARS-CoV2 (COVID-19) Qualitative PCR     Not Detected           Sed Rate         53       Sodium         138       Troponin I High Sensitivity         14.4  Comment: Troponin results differ between methods. Do  not use   results between Troponin methods interchangeably.    Alkaline Phospatase levels above 400 U/L may   cause false positive results.    Access hsTnI should not be used for patients taking   Asfotase lizzeth (Strensiq).         WBC         6.02                               [C] - Corrected Result               Significant Imaging: I have reviewed all pertinent imaging results/findings within the past 24 hours.

## 2023-12-04 NOTE — HPI
Patient is an 83-year-old female with history of CAD status post PCI, VFib arrest, type 2 diabetes, hyperlipidemia, hypertension, right lobe adenocarcinoma status post lobectomy who presents to the emergency room for 2 days of fatigue, generalized weakness, vomiting.  Patient reports sudden onset of symptoms.  Denies any sick contacts.  Reports diarrhea although it is resolved today.  Patient reports not started chemotherapy yet.  Denies chest pain, shortness for breath, abdominal pain.  Temp on admission was 100.5, oxygen saturation was 89% patient was placed on 1 L nasal cannula with improvement.  Chest x-ray showed diffuse infiltrative changes in the right lung and right pleural effusion that is unchanged from prior chest x-ray.  Troponin 16.6.  Patient being admitted for sepsis of unknown origin.  Urinalysis pending.  Given cefepime and vancomycin in the emergency room.

## 2023-12-04 NOTE — PROGRESS NOTES
[facility]  Progress Note    Patient Name: Alexa Otero  MRN: [id number]  Patient Class: OP- Observation   Admission Date: 12/3/2023  Length of Stay: 0 days  Attending Physician: Garrett Jones MD  Primary Care Provider: Idalia Greco MD        Subjective:     Principal Problem:Pneumonia        HPI:  Patient is an 83-year-old female with history of CAD status post PCI, VFib arrest, type 2 diabetes, hyperlipidemia, hypertension, right lobe adenocarcinoma status post lobectomy who presents to the emergency room for 2 days of fatigue, generalized weakness, vomiting.  Patient reports sudden onset of symptoms.  Denies any sick contacts.  Reports diarrhea although it is resolved today.  Patient reports not started chemotherapy yet.  Denies chest pain, shortness for breath, abdominal pain.  Temp on admission was 100.5, oxygen saturation was 89% patient was placed on 1 L nasal cannula with improvement.  Chest x-ray showed diffuse infiltrative changes in the right lung and right pleural effusion that is unchanged from prior chest x-ray.  Troponin 16.6.  Patient being admitted for sepsis of unknown origin.  Urinalysis pending.  Given cefepime and vancomycin in the emergency room.        Overview/Hospital Course:  No notes on file    Interval History:  Patient does report feeling better today.  Is on 1 L nasal cannula.  CTA negative PE.  Does show increasing pleural effusion and pneumonia.  Heme-Onc and pulmonology consulted.    Review of Systems   Constitutional:  Positive for chills and fatigue.   HENT:  Negative for congestion and ear pain.    Respiratory:  Negative for chest tightness and shortness of breath.    Cardiovascular:  Negative for chest pain and palpitations.   Gastrointestinal:  Positive for diarrhea and nausea. Negative for abdominal distention and abdominal pain.   Genitourinary:  Negative for difficulty urinating and dysuria.   Musculoskeletal:  Negative for arthralgias and  back pain.   Neurological:  Negative for dizziness.   Psychiatric/Behavioral:  Negative for agitation and behavioral problems.      Objective:     Vital Signs (Most Recent):  Temp: 98.2 °F (36.8 °C) (12/04/23 1030)  Pulse: 88 (12/04/23 1030)  Resp: 18 (12/04/23 1030)  BP: 96/60 (12/04/23 1030)  SpO2: 96 % (12/04/23 1030) Vital Signs (24h Range):  Temp:  [97.5 °F (36.4 °C)-100.5 °F (38.1 °C)] 98.2 °F (36.8 °C)  Pulse:  [] 88  Resp:  [16-26] 18  SpO2:  [89 %-98 %] 96 %  BP: ()/(47-73) 96/60     Weight: 53.3 kg (117 lb 9.6 oz)  Body mass index is 20.83 kg/m².    Intake/Output Summary (Last 24 hours) at 12/4/2023 1341  Last data filed at 12/3/2023 2148  Gross per 24 hour   Intake 1250 ml   Output --   Net 1250 ml         Physical Exam  Vitals reviewed.   Constitutional:       Appearance: She is ill-appearing.   HENT:      Head: Normocephalic and atraumatic.      Right Ear: External ear normal.      Left Ear: External ear normal.      Mouth/Throat:      Mouth: Mucous membranes are dry.   Cardiovascular:      Rate and Rhythm: Regular rhythm. Tachycardia present.   Pulmonary:      Breath sounds: Rhonchi present.   Chest:      Chest wall: No tenderness.   Abdominal:      General: There is no distension.      Tenderness: There is no abdominal tenderness.   Musculoskeletal:         General: Normal range of motion.   Skin:     General: Skin is warm and dry.   Neurological:      General: No focal deficit present.      Mental Status: She is alert and oriented to person, place, and time.             Significant Labs: All pertinent labs within the past 24 hours have been reviewed.  Recent Lab Results  (Last 5 results in the past 24 hours)        12/04/23  1051   12/04/23  1029   12/04/23  0758   12/04/23  0711   12/04/23  0303        Respiratory Infection Panel Source     NP swab           Adeno Test     Not Detected           Coronavirus 229E, Common Cold Virus     Not Detected           Coronavirus HKU1, Common Cold  Virus     Not Detected           Coronavirus NL63, Common Cold Virus     Not Detected           Coronavirus OC43, Common Cold Virus     Not Detected  Comment: Coronavirus strains 229E, HKU1, NL63, and OC43 can cause the common   cold   and are not associated with the respiratory disease outbreak caused   by  the COVID-19 (SARS-CoV-2 novel Coronavirus) strain.              Human Metapneumovirus     Not Detected           Human Rhinovirus/Enterovirus     Not Detected           Influenza A (subtypes H1, H1-2009,H3)     Not Detected           Influenza B     Not Detected           Parainfluenza Virus 1     Not Detected           Parainfluenza Virus 2     Not Detected           Parainfluenza Virus 3     Not Detected           Parainfluenza Virus 4     Not Detected           Respiratory Syncytial Virus     Not Detected           Bordetella Parapertussis (RO7285)     Not Detected           Bordetella pertussis (ptxP)     Not Detected           Chlamydia pneumoniae     Not Detected           Mycoplasma pneumoniae     Not Detected  Comment: Respiratory Infection Panel testing performed by Multiplex PCR.           Albumin         2.8       ALP         36       ALT         9       Anion Gap         7       Aniso         Slight       AST         10       Bands         31.0       Basophil %         0.0       BILIRUBIN TOTAL         0.5  Comment: For infants and newborns, interpretation of results should be based  on gestational age, weight and in agreement with clinical  observations.    Premature Infant recommended reference ranges:  Up to 24 hours.............<8.0 mg/dL  Up to 48 hours............<12.0 mg/dL  3-5 days..................<15.0 mg/dL  6-29 days.................<15.0 mg/dL         BUN         27       Calcium         8.0       Chloride         105       CO2         26       Creatinine         0.9       Differential Method         Manual  Comment: CORRECTED RESULT; previously reported as Automated on 12/04/2023 at    05:16.    [C]       eGFR         >60.0       Eosinophil %         0.0       Glucose         99       Gran %         51.0       Hematocrit         28.3       Hemoglobin         8.8       Immature Grans (Abs)         CANCELED  Comment: Mild elevation in immature granulocytes is non specific and   can be seen in a variety of conditions including stress response,   acute inflammation, trauma and pregnancy. Correlation with other   laboratory and clinical findings is essential.    Result canceled by the ancillary.         Immature Granulocytes         CANCELED  Comment: Result canceled by the ancillary.       Lymph %         18.0       Magnesium          1.3       MCH         28.3       MCHC         31.1       MCV         91       Mono %         0.0       MPV         9.6       MRSA SCREEN BY PCR Negative               nRBC         0       Platelet Estimate         Appears normal       Platelet Count         204       POC Glucose   129     92         Potassium         4.3       PROTEIN TOTAL         5.1       RBC         3.11       RDW         14.8       SARS-CoV2 (COVID-19) Qualitative PCR     Not Detected           Sed Rate         53       Sodium         138       Troponin I High Sensitivity         14.4  Comment: Troponin results differ between methods. Do not use   results between Troponin methods interchangeably.    Alkaline Phospatase levels above 400 U/L may   cause false positive results.    Access hsTnI should not be used for patients taking   Asfotase lizzeth (Strensiq).         WBC         6.02                               [C] - Corrected Result               Significant Imaging: I have reviewed all pertinent imaging results/findings within the past 24 hours.    Assessment/Plan:      * Pneumonia  CTA chest negative for PE   Development of moderate size right pleural effusion with diffuse alveolar opacities in the right upper lobe and patchy groundglass opacities in the left upper lobe and left lung base  suggestive of multifocal pneumonia  Improvement of the dense alveolar consolidation in the right lower lobe    Blood cultures no growth.  Respiratory infection panel negative.  MRSA screen negative.  Stop vancomycin.  Continue ceftriaxone.  Start azithromycin.  Obtain sputum culture.  Heme-Onc and pulmonology consulted.  Currently on 1 L nasal cannula.  Wean as tolerated.    Malignant neoplasm of lower lobe of right lung  Follows with Dr. Lloyd outpatient, status post right lower lobe lobectomy.  Has not started chemotherapy yet.  Heme-Onc consulted      Elevated troponin  Likely secondary to demand, follow up repeat troponin      Cardiopulmonary arrest  History of VFib arrest on 10/13/2023, currently on amiodarone    On dual antiplatelet therapy for CAD.    Controlled type 2 diabetes mellitus with stage 3 chronic kidney disease, without long-term current use of insulin  Creatine stable for now. BMP reviewed- noted Estimated Creatinine Clearance: 35.9 mL/min (based on SCr of 1 mg/dL). according to latest data. Based on current GFR, CKD stage is stage 3 - GFR 30-59.  Monitor UOP and serial BMP and adjust therapy as needed. Renally dose meds. Avoid nephrotoxic medications and procedures.    Patient given fluid bolus in the emergency room.  We will start on IV fluids gentle.  Continue with insulin sliding scale.  Home oral medications held.      VTE Risk Mitigation (From admission, onward)           Ordered     enoxaparin injection 40 mg  Daily         12/03/23 2138     IP VTE HIGH RISK PATIENT  Once         12/03/23 2137     Place sequential compression device  Until discontinued         12/03/23 2137                    Discharge Planning   ALYCIA: 12/5/2023     Code Status: Full Code   Is the patient medically ready for discharge?:     Reason for patient still in hospital (select all that apply): Treatment  Discharge Plan A: Home with family                  Garrett Jones MD  Department of Hospital Medicine   [place]  [facility] [place]

## 2023-12-04 NOTE — SUBJECTIVE & OBJECTIVE
Past Medical History:   Diagnosis Date    Arthritis     Cardiac arrest 10/2023    Cataract     Colon polyp     Diabetes mellitus type II 2012    Encounter for blood transfusion     Extra-ovarian endometrioid adenocarcinoma 2020    GERD (gastroesophageal reflux disease)     History of chronic cough     clear productive    Hyperlipidemia     Hypertension     Macular degeneration     Ovarian cancer     PONV (postoperative nausea and vomiting)     Squamous cell carcinoma 1980's    precancer of cervix       Past Surgical History:   Procedure Laterality Date    AUGMENTATION OF BREAST      BRONCHOSCOPY WITH FLUOROSCOPY Right 05/11/2023    Procedure: BRONCHOSCOPY, WITH FLUOROSCOPY;  Surgeon: Neva Christian MD;  Location: [facility];  Service: Pulmonary;  Laterality: Right;    CATARACT EXTRACTION BILATERAL W/ ANTERIOR VITRECTOMY Bilateral     CHOLECYSTECTOMY      COLONOSCOPY      COLONOSCOPY N/A 04/20/2023    Procedure: COLONOSCOPY;  Surgeon: Sabino Stephenson MD;  Location: [facility];  Service: Endoscopy;  Laterality: N/A;    CORONARY STENT PLACEMENT  10/2023    x 3    ESOPHAGOGASTRODUODENOSCOPY N/A 06/20/2018    Procedure: EGD (ESOPHAGOGASTRODUODENOSCOPY);  Surgeon: Sabino Stephenson MD;  Location: [facility];  Service: Endoscopy;  Laterality: N/A;    ESOPHAGOGASTRODUODENOSCOPY N/A 03/31/2023    Procedure: EGD (ESOPHAGOGASTRODUODENOSCOPY);  Surgeon: Sabino Stephenson MD;  Location: [facility];  Service: Endoscopy;  Laterality: N/A;    HYSTERECTOMY  1976    partial    INJECTION OF ANESTHETIC AGENT AROUND MEDIAL BRANCH NERVES INNERVATING LUMBAR FACET JOINT Right 05/10/2023    Procedure: Block-nerve-Lateral-branch-lumbar;  Surgeon: Agustin Robles MD;  Location: [facility] OR;  Service: Pain Management;  Laterality: Right;  L5 and s1,s2 LBB    INJECTION OF ANESTHETIC AGENT AROUND MEDIAL BRANCH NERVES INNERVATING LUMBAR FACET JOINT Right 05/30/2023    Procedure: Block-nerve-medial branch-lumbar;  Surgeon: Agustin Robles MD;  Location: [facility]  OR;  Service: Pain Management;  Laterality: Right;  L5, s1 ,s2 LBB #2    INJECTION OF ANESTHETIC AGENT AROUND NERVE Right 10/31/2023    Procedure: INTERCOSTAL NERVE BLOCK;  Surgeon: Washington Shankar MD;  Location: [facility] OR;  Service: Cardiothoracic;  Laterality: Right;    INJECTION, SACROILIAC JOINT Right 01/26/2023    Procedure: INJECTION,SACROILIAC JOINT;  Surgeon: Agustin Robles MD;  Location: [facility] OR;  Service: Pain Management;  Laterality: Right;    INSERTION OF TUNNELED CENTRAL VENOUS CATHETER (CVC) WITH SUBCUTANEOUS PORT Right 10/19/2020    Procedure: INSERTION, PORT-A-CATH;  Surgeon: Misti Mcfarland MD;  Location: [facility] OR;  Service: General;  Laterality: Right;    INTRAMEDULLARY RODDING OF TROCHANTER OF FEMUR Right 11/28/2021    Procedure: INSERTION, INTRAMEDULLARY LUC, FEMUR, TROCHANTER/RIGHT TFN DR FAJARDO NOTIFIED REP;  Surgeon: Kp Fajardo MD;  Location: [facility] OR;  Service: Orthopedics;  Laterality: Right;  SKIP    ROBOT-ASSISTED LAPAROSCOPIC LYMPHADENECTOMY USING DA NELLA XI N/A 09/21/2020    Procedure: XI ROBOTIC LYMPHADENECTOMY-pelvic and kell-aortic;  Surgeon: Altagracia Ray MD;  Location: [facility] OR;  Service: OB/GYN;  Laterality: N/A;    ROBOT-ASSISTED LAPAROSCOPIC OMENTECTOMY USING DA NELLA XI N/A 09/21/2020    Procedure: XI ROBOTIC OMENTECTOMY;  Surgeon: Altagracia Ray MD;  Location: [facility] OR;  Service: OB/GYN;  Laterality: N/A;    ROBOT-ASSISTED LAPAROSCOPIC SALPINGO-OOPHORECTOMY USING DA NELLA XI Bilateral 09/21/2020    Procedure: XI ROBOTIC SALPINGO-OOPHORECTOMY;  Surgeon: Altagracia Ray MD;  Location: [facility] OR;  Service: OB/GYN;  Laterality: Bilateral;    THORACOSCOPIC BIOPSY OF PLEURA Right 10/31/2023    Procedure: VATS, WITH PLEURA BIOPSY;  Surgeon: Washington Shankar MD;  Location: [facility] OR;  Service: Cardiothoracic;  Laterality: Right;  FROZEN SECTION   **KRISTEN-BETOT**    THORACOTOMY Right 10/31/2023    Procedure: THORACOTOMY;  Surgeon: Washington Shankar MD;  Location:  [facility] OR;  Service: Cardiothoracic;  Laterality: Right;    UPPER GASTROINTESTINAL ENDOSCOPY         Review of patient's allergies indicates:  No Known Allergies    No current facility-administered medications on file prior to encounter.     Current Outpatient Medications on File Prior to Encounter   Medication Sig    amiodarone (PACERONE) 200 MG Tab Take 1 tablet (200 mg total) by mouth 2 (two) times daily.    aspirin (ECOTRIN) 81 MG EC tablet Take 81 mg by mouth once daily.    famotidine (PEPCID) 20 MG tablet Take 1 tablet (20 mg total) by mouth once daily. for 30 doses    gabapentin (NEURONTIN) 100 MG capsule TAKE 1 CAPSULE(100 MG) BY MOUTH TWICE DAILY (Patient taking differently: Take 100 mg by mouth 2 (two) times daily.)    magnesium oxide (MAG-OX) 400 mg (241.3 mg magnesium) tablet Take 400 mg by mouth once daily.    melatonin 5 mg Chew Take 2 tablets by mouth nightly as needed (insomnia).    metFORMIN (GLUCOPHAGE) 500 MG tablet TAKE 1 TABLET(500 MG) BY MOUTH TWICE DAILY WITH MEALS (Patient taking differently: Take 500 mg by mouth 2 (two) times a day.)    metoprolol succinate (TOPROL-XL) 25 MG 24 hr tablet Take 25 mg by mouth once daily.    VIT A/VIT C/VIT E/ZINC/COPPER (ICAPS AREDS ORAL) Take 1 capsule by mouth 2 (two) times a day.     clopidogreL (PLAVIX) 75 mg tablet Take 75 mg by mouth once daily.    HYDROcodone-acetaminophen (NORCO) 5-325 mg per tablet Take 1 tablet by mouth every 6 (six) hours as needed for Pain.     Family History       Problem Relation (Age of Onset)    Breast cancer Maternal Grandmother, Paternal Grandmother    Cancer Mother (57), Father (56)    Colon polyps Daughter    Melanoma Brother    Skin cancer Sister, Brother, Brother          Tobacco Use    Smoking status: Former     Current packs/day: 0.00     Average packs/day: 1 pack/day for 20.0 years (20.0 ttl pk-yrs)     Types: Cigarettes     Start date:      Quit date:      Years since quittin.9    Smokeless tobacco: Never     Tobacco comments:     quit 40 yrs ago   Substance and Sexual Activity    Alcohol use: Yes     Alcohol/week: 1.0 standard drink of alcohol     Types: 1 Glasses of wine per week     Comment: occasional    Drug use: No    Sexual activity: Not Currently     Partners: Male     Review of Systems   Constitutional:  Positive for chills and fatigue.   HENT:  Negative for congestion and ear pain.    Respiratory:  Negative for chest tightness and shortness of breath.    Cardiovascular:  Negative for chest pain and palpitations.   Gastrointestinal:  Positive for diarrhea and nausea. Negative for abdominal distention and abdominal pain.   Genitourinary:  Negative for difficulty urinating and dysuria.   Musculoskeletal:  Negative for arthralgias and back pain.   Neurological:  Negative for dizziness.   Psychiatric/Behavioral:  Negative for agitation and behavioral problems.      Objective:     Vital Signs (Most Recent):  Temp: 98.9 °F (37.2 °C) (12/03/23 2200)  Pulse: 93 (12/03/23 2200)  Resp: (!) 23 (12/03/23 2200)  BP: (!) 100/55 (12/03/23 2200)  SpO2: 95 % (12/03/23 2200) Vital Signs (24h Range):  Temp:  [98.9 °F (37.2 °C)-100.5 °F (38.1 °C)] 98.9 °F (37.2 °C)  Pulse:  [] 93  Resp:  [16-26] 23  SpO2:  [89 %-97 %] 95 %  BP: ()/(50-73) 100/55     Weight: 53.1 kg (117 lb)  Body mass index is 20.73 kg/m².     Physical Exam  Vitals reviewed.   Constitutional:       Appearance: She is ill-appearing.   HENT:      Head: Normocephalic and atraumatic.      Right Ear: External ear normal.      Left Ear: External ear normal.      Mouth/Throat:      Mouth: Mucous membranes are dry.   Cardiovascular:      Rate and Rhythm: Regular rhythm. Tachycardia present.   Pulmonary:      Breath sounds: Rhonchi present.   Chest:      Chest wall: No tenderness.   Abdominal:      General: There is no distension.      Tenderness: There is no abdominal tenderness.   Musculoskeletal:         General: Normal range of motion.   Skin:      General: Skin is warm and dry.   Neurological:      General: No focal deficit present.      Mental Status: She is alert and oriented to person, place, and time.                Significant Labs: All pertinent labs within the past 24 hours have been reviewed.  CBC:   Recent Labs   Lab 12/03/23  1856   WBC 7.14   HGB 10.7*   HCT 32.7*        CMP:   Recent Labs   Lab 12/03/23  1856      K 3.5      CO2 22*   *   BUN 24*   CREATININE 1.0   CALCIUM 8.8   PROT 6.2   ALBUMIN 3.4*   BILITOT 0.5   ALKPHOS 45*   AST 13   ALT 12   ANIONGAP 11       Significant Imaging: I have reviewed all pertinent imaging results/findings within the past 24 hours.

## 2023-12-04 NOTE — PROGRESS NOTES
VANCOMYCIN PHARMACOKINETIC NOTE:  Vancomycin Day # 1    Objective/Assessment:    Diagnosis/Indication for Vancomycin:Sepsis      83 y.o., female; Actual Body Weight = 53.3 kg (117 lb 9.6 oz).    The patient has the following labs:  12/4/2023 Estimated Creatinine Clearance: 35.9 mL/min (based on SCr of 1 mg/dL). Lab Results   Component Value Date    BUN 24 (H) 12/03/2023     Lab Results   Component Value Date    WBC 7.14 12/03/2023          Plan:  Adjust vancomycin dose and/or frequency based on the patient's actual weight and renal function:  Initiate Vancomycin 750 mg IV every 24 hours following 1250 mg loading dose.  Orders have been entered into patient's chart.        Vancomycin trough level has been ordered for 12/5 at 1900.    Pharmacy will manage vancomycin therapy, monitor serum vancomycin levels, monitor renal function and adjust regimen as necessary.    Thank you for allowing us to participate in this patient's care.     Marcy Breaux 12/4/2023   Department of Pharmacy  Ext [phone]

## 2023-12-05 PROBLEM — A41.9 SEPSIS: Status: ACTIVE | Noted: 2023-12-05

## 2023-12-05 LAB
ALBUMIN SERPL BCP-MCNC: 2.9 G/DL (ref 3.5–5.2)
ALP SERPL-CCNC: 47 U/L (ref 55–135)
ALT SERPL W/O P-5'-P-CCNC: 10 U/L (ref 10–44)
ANION GAP SERPL CALC-SCNC: 7 MMOL/L (ref 8–16)
APPEARANCE FLD: NORMAL
AST SERPL-CCNC: 9 U/L (ref 10–40)
BASOPHILS # BLD AUTO: 0.02 K/UL (ref 0–0.2)
BASOPHILS NFR BLD: 0.3 % (ref 0–1.9)
BILIRUB SERPL-MCNC: 0.4 MG/DL (ref 0.1–1)
BODY FLD TYPE: NORMAL
BODY FLUID SOURCE, LDH: NORMAL
BUN SERPL-MCNC: 26 MG/DL (ref 8–23)
CALCIUM SERPL-MCNC: 8.4 MG/DL (ref 8.7–10.5)
CHLORIDE SERPL-SCNC: 103 MMOL/L (ref 95–110)
CO2 SERPL-SCNC: 26 MMOL/L (ref 23–29)
COLOR FLD: YELLOW
CREAT SERPL-MCNC: 1.1 MG/DL (ref 0.5–1.4)
CRP SERPL-MCNC: 378.6 MG/L (ref 0–8.2)
DIFFERENTIAL METHOD: ABNORMAL
EOSINOPHIL # BLD AUTO: 0.1 K/UL (ref 0–0.5)
EOSINOPHIL NFR BLD: 2.2 % (ref 0–8)
ERYTHROCYTE [DISTWIDTH] IN BLOOD BY AUTOMATED COUNT: 15.1 % (ref 11.5–14.5)
EST. GFR  (NO RACE VARIABLE): 50.2 ML/MIN/1.73 M^2
GLUCOSE FLD-MCNC: 107 MG/DL
GLUCOSE SERPL-MCNC: 101 MG/DL (ref 70–110)
GLUCOSE SERPL-MCNC: 110 MG/DL (ref 70–110)
GLUCOSE SERPL-MCNC: 128 MG/DL (ref 70–110)
GLUCOSE SERPL-MCNC: 140 MG/DL (ref 70–110)
GLUCOSE SERPL-MCNC: 145 MG/DL (ref 70–110)
HCT VFR BLD AUTO: 29.3 % (ref 37–48.5)
HGB BLD-MCNC: 9.1 G/DL (ref 12–16)
IMM GRANULOCYTES # BLD AUTO: 0.08 K/UL (ref 0–0.04)
IMM GRANULOCYTES NFR BLD AUTO: 1.3 % (ref 0–0.5)
LDH FLD L TO P-CCNC: 107 U/L
LDH SERPL L TO P-CCNC: 147 U/L (ref 110–260)
LYMPHOCYTES # BLD AUTO: 0.7 K/UL (ref 1–4.8)
LYMPHOCYTES NFR BLD: 10.8 % (ref 18–48)
LYMPHOCYTES NFR FLD MANUAL: 18 %
MAGNESIUM SERPL-MCNC: 2.6 MG/DL (ref 1.6–2.6)
MCH RBC QN AUTO: 28.6 PG (ref 27–31)
MCHC RBC AUTO-ENTMCNC: 31.1 G/DL (ref 32–36)
MCV RBC AUTO: 92 FL (ref 82–98)
MESOTHL CELL NFR FLD MANUAL: 1 %
MONOCYTES # BLD AUTO: 0.6 K/UL (ref 0.3–1)
MONOCYTES NFR BLD: 9.9 % (ref 4–15)
MONOS+MACROS NFR FLD MANUAL: 6 %
NEUTROPHILS # BLD AUTO: 4.7 K/UL (ref 1.8–7.7)
NEUTROPHILS NFR BLD: 75.5 % (ref 38–73)
NEUTROPHILS NFR FLD MANUAL: 76 %
NRBC BLD-RTO: 0 /100 WBC
PLATELET # BLD AUTO: 261 K/UL (ref 150–450)
PMV BLD AUTO: 10.1 FL (ref 9.2–12.9)
POTASSIUM SERPL-SCNC: 4.2 MMOL/L (ref 3.5–5.1)
PROT FLD-MCNC: <3 G/DL
PROT SERPL-MCNC: 5.7 G/DL (ref 6–8.4)
RBC # BLD AUTO: 3.18 M/UL (ref 4–5.4)
SODIUM SERPL-SCNC: 136 MMOL/L (ref 136–145)
SPECIMEN SOURCE: NORMAL
SPECIMEN SOURCE: NORMAL
WBC # BLD AUTO: 6.28 K/UL (ref 3.9–12.7)
WBC # FLD: 2791 /CU MM

## 2023-12-05 PROCEDURE — 99232 SBSQ HOSP IP/OBS MODERATE 35: CPT | Mod: 25,,, | Performed by: INTERNAL MEDICINE

## 2023-12-05 PROCEDURE — 84157 ASSAY OF PROTEIN OTHER: CPT | Performed by: INTERNAL MEDICINE

## 2023-12-05 PROCEDURE — 87102 FUNGUS ISOLATION CULTURE: CPT | Performed by: INTERNAL MEDICINE

## 2023-12-05 PROCEDURE — 87070 CULTURE OTHR SPECIMN AEROBIC: CPT | Performed by: STUDENT IN AN ORGANIZED HEALTH CARE EDUCATION/TRAINING PROGRAM

## 2023-12-05 PROCEDURE — 25000003 PHARM REV CODE 250: Performed by: STUDENT IN AN ORGANIZED HEALTH CARE EDUCATION/TRAINING PROGRAM

## 2023-12-05 PROCEDURE — 27000221 HC OXYGEN, UP TO 24 HOURS

## 2023-12-05 PROCEDURE — 63600175 PHARM REV CODE 636 W HCPCS: Performed by: STUDENT IN AN ORGANIZED HEALTH CARE EDUCATION/TRAINING PROGRAM

## 2023-12-05 PROCEDURE — 83615 LACTATE (LD) (LDH) ENZYME: CPT | Performed by: INTERNAL MEDICINE

## 2023-12-05 PROCEDURE — 87116 MYCOBACTERIA CULTURE: CPT | Performed by: INTERNAL MEDICINE

## 2023-12-05 PROCEDURE — 32555 ASPIRATE PLEURA W/ IMAGING: CPT | Mod: RT,,, | Performed by: INTERNAL MEDICINE

## 2023-12-05 PROCEDURE — 63600175 PHARM REV CODE 636 W HCPCS: Performed by: INTERNAL MEDICINE

## 2023-12-05 PROCEDURE — 12000002 HC ACUTE/MED SURGE SEMI-PRIVATE ROOM

## 2023-12-05 PROCEDURE — 88305 TISSUE EXAM BY PATHOLOGIST: CPT | Mod: TC | Performed by: PATHOLOGY

## 2023-12-05 PROCEDURE — 83615 LACTATE (LD) (LDH) ENZYME: CPT | Mod: 91 | Performed by: STUDENT IN AN ORGANIZED HEALTH CARE EDUCATION/TRAINING PROGRAM

## 2023-12-05 PROCEDURE — 94761 N-INVAS EAR/PLS OXIMETRY MLT: CPT

## 2023-12-05 PROCEDURE — 85025 COMPLETE CBC W/AUTO DIFF WBC: CPT | Performed by: STUDENT IN AN ORGANIZED HEALTH CARE EDUCATION/TRAINING PROGRAM

## 2023-12-05 PROCEDURE — 83735 ASSAY OF MAGNESIUM: CPT | Performed by: STUDENT IN AN ORGANIZED HEALTH CARE EDUCATION/TRAINING PROGRAM

## 2023-12-05 PROCEDURE — 80053 COMPREHEN METABOLIC PANEL: CPT | Performed by: STUDENT IN AN ORGANIZED HEALTH CARE EDUCATION/TRAINING PROGRAM

## 2023-12-05 PROCEDURE — 99900035 HC TECH TIME PER 15 MIN (STAT)

## 2023-12-05 PROCEDURE — 87205 SMEAR GRAM STAIN: CPT | Performed by: STUDENT IN AN ORGANIZED HEALTH CARE EDUCATION/TRAINING PROGRAM

## 2023-12-05 PROCEDURE — 87206 SMEAR FLUORESCENT/ACID STAI: CPT | Performed by: INTERNAL MEDICINE

## 2023-12-05 PROCEDURE — 99232 PR SUBSEQUENT HOSPITAL CARE,LEVL II: ICD-10-PCS | Mod: 25,,, | Performed by: INTERNAL MEDICINE

## 2023-12-05 PROCEDURE — 89051 BODY FLUID CELL COUNT: CPT | Performed by: INTERNAL MEDICINE

## 2023-12-05 PROCEDURE — 82945 GLUCOSE OTHER FLUID: CPT | Performed by: INTERNAL MEDICINE

## 2023-12-05 PROCEDURE — 32555 PR THORACEN W/IMAG GUIDANCE: ICD-10-PCS | Mod: RT,,, | Performed by: INTERNAL MEDICINE

## 2023-12-05 RX ORDER — HEPARIN 100 UNIT/ML
5 SYRINGE INTRAVENOUS ONCE
Status: COMPLETED | OUTPATIENT
Start: 2023-12-05 | End: 2023-12-05

## 2023-12-05 RX ADMIN — METOPROLOL SUCCINATE 25 MG: 25 TABLET, EXTENDED RELEASE ORAL at 09:12

## 2023-12-05 RX ADMIN — HEPARIN 500 UNITS: 100 SYRINGE at 09:12

## 2023-12-05 RX ADMIN — AZITHROMYCIN DIHYDRATE 500 MG: 500 INJECTION, POWDER, LYOPHILIZED, FOR SOLUTION INTRAVENOUS at 02:12

## 2023-12-05 RX ADMIN — ASPIRIN 81 MG: 81 TABLET, COATED ORAL at 09:12

## 2023-12-05 RX ADMIN — MUPIROCIN 1 G: 20 OINTMENT TOPICAL at 09:12

## 2023-12-05 RX ADMIN — CLOPIDOGREL BISULFATE 75 MG: 75 TABLET, FILM COATED ORAL at 09:12

## 2023-12-05 RX ADMIN — AMIODARONE HYDROCHLORIDE 200 MG: 200 TABLET ORAL at 09:12

## 2023-12-05 RX ADMIN — GABAPENTIN 100 MG: 100 CAPSULE ORAL at 09:12

## 2023-12-05 RX ADMIN — CEFTRIAXONE SODIUM 1 G: 1 INJECTION, POWDER, FOR SOLUTION INTRAMUSCULAR; INTRAVENOUS at 05:12

## 2023-12-05 RX ADMIN — FAMOTIDINE 20 MG: 20 TABLET ORAL at 09:12

## 2023-12-05 RX ADMIN — Medication 6 MG: at 09:12

## 2023-12-05 RX ADMIN — ENOXAPARIN SODIUM 40 MG: 40 INJECTION SUBCUTANEOUS at 05:12

## 2023-12-05 NOTE — SUBJECTIVE & OBJECTIVE
Oncology Treatment Plan:   [No matching plan found]    Medications:  Continuous Infusions:  Scheduled Meds:   amiodarone  200 mg Oral BID    aspirin  81 mg Oral Daily    azithromycin  500 mg Intravenous Q24H    cefTRIAXone (ROCEPHIN) IVPB  1 g Intravenous Q24H    clopidogreL  75 mg Oral Daily    enoxparin  40 mg Subcutaneous Daily    famotidine  20 mg Oral Daily    gabapentin  100 mg Oral BID    metoprolol succinate  25 mg Oral Daily    mupirocin   Nasal BID     PRN Meds:acetaminophen, dextrose 50%, dextrose 50%, glucagon (human recombinant), glucose, glucose, HYDROcodone-acetaminophen, HYDROcodone-acetaminophen, insulin aspart U-100, melatonin, ondansetron, sodium chloride 0.9%, sodium chloride 0.9%     Review of patient's allergies indicates:  No Known Allergies     Past Medical History:   Diagnosis Date    Arthritis     Cardiac arrest 10/2023    Cataract     Colon polyp     Diabetes mellitus type II 2012    Encounter for blood transfusion     Extra-ovarian endometrioid adenocarcinoma 2020    GERD (gastroesophageal reflux disease)     History of chronic cough     clear productive    Hyperlipidemia     Hypertension     Macular degeneration     Ovarian cancer     PONV (postoperative nausea and vomiting)     Squamous cell carcinoma 1980's    precancer of cervix     Past Surgical History:   Procedure Laterality Date    AUGMENTATION OF BREAST      BRONCHOSCOPY WITH FLUOROSCOPY Right 05/11/2023    Procedure: BRONCHOSCOPY, WITH FLUOROSCOPY;  Surgeon: Neva Christian MD;  Location: [facility] ENDO;  Service: Pulmonary;  Laterality: Right;    CATARACT EXTRACTION BILATERAL W/ ANTERIOR VITRECTOMY Bilateral     CHOLECYSTECTOMY      COLONOSCOPY      COLONOSCOPY N/A 04/20/2023    Procedure: COLONOSCOPY;  Surgeon: Sabino Stephenson MD;  Location: [facility] ENDO;  Service: Endoscopy;  Laterality: N/A;    CORONARY STENT PLACEMENT  10/2023    x 3    ESOPHAGOGASTRODUODENOSCOPY N/A 06/20/2018    Procedure: EGD (ESOPHAGOGASTRODUODENOSCOPY);   Surgeon: Sabino Stephenson MD;  Location: [facility] ENDO;  Service: Endoscopy;  Laterality: N/A;    ESOPHAGOGASTRODUODENOSCOPY N/A 03/31/2023    Procedure: EGD (ESOPHAGOGASTRODUODENOSCOPY);  Surgeon: Sabino Stephenson MD;  Location: [facility] ENDO;  Service: Endoscopy;  Laterality: N/A;    HYSTERECTOMY  1976    partial    INJECTION OF ANESTHETIC AGENT AROUND MEDIAL BRANCH NERVES INNERVATING LUMBAR FACET JOINT Right 05/10/2023    Procedure: Block-nerve-Lateral-branch-lumbar;  Surgeon: Agustin Robles MD;  Location: [facility];  Service: Pain Management;  Laterality: Right;  L5 and s1,s2 LBB    INJECTION OF ANESTHETIC AGENT AROUND MEDIAL BRANCH NERVES INNERVATING LUMBAR FACET JOINT Right 05/30/2023    Procedure: Block-nerve-medial branch-lumbar;  Surgeon: Agustin Robles MD;  Location: [facility];  Service: Pain Management;  Laterality: Right;  L5, s1 ,s2 LBB #2    INJECTION OF ANESTHETIC AGENT AROUND NERVE Right 10/31/2023    Procedure: INTERCOSTAL NERVE BLOCK;  Surgeon: Washington Shankar MD;  Location: [facility];  Service: Cardiothoracic;  Laterality: Right;    INJECTION, SACROILIAC JOINT Right 01/26/2023    Procedure: INJECTION,SACROILIAC JOINT;  Surgeon: Agustin Robles MD;  Location: [facility];  Service: Pain Management;  Laterality: Right;    INSERTION OF TUNNELED CENTRAL VENOUS CATHETER (CVC) WITH SUBCUTANEOUS PORT Right 10/19/2020    Procedure: INSERTION, PORT-A-CATH;  Surgeon: Misti Mcfarland MD;  Location: [facility] OR;  Service: General;  Laterality: Right;    INTRAMEDULLARY RODDING OF TROCHANTER OF FEMUR Right 11/28/2021    Procedure: INSERTION, INTRAMEDULLARY LUC, FEMUR, TROCHANTER/RIGHT TFN DR FAJARDO NOTIFIED REP;  Surgeon: Kp Fajardo MD;  Location: [facility] OR;  Service: Orthopedics;  Laterality: Right;  SKIP    ROBOT-ASSISTED LAPAROSCOPIC LYMPHADENECTOMY USING DA NELLA XI N/A 09/21/2020    Procedure: XI ROBOTIC LYMPHADENECTOMY-pelvic and kell-aortic;  Surgeon: Altagracia Ray MD;  Location: [facility] OR;  Service: OB/GYN;   Laterality: N/A;    ROBOT-ASSISTED LAPAROSCOPIC OMENTECTOMY USING DA NELLA XI N/A 2020    Procedure: XI ROBOTIC OMENTECTOMY;  Surgeon: Altagracia Ray MD;  Location: [facility] OR;  Service: OB/GYN;  Laterality: N/A;    ROBOT-ASSISTED LAPAROSCOPIC SALPINGO-OOPHORECTOMY USING DA NELLA XI Bilateral 2020    Procedure: XI ROBOTIC SALPINGO-OOPHORECTOMY;  Surgeon: Altagracia Ray MD;  Location: [facility] OR;  Service: OB/GYN;  Laterality: Bilateral;    THORACOSCOPIC BIOPSY OF PLEURA Right 10/31/2023    Procedure: VATS, WITH PLEURA BIOPSY;  Surgeon: Washington Shankar MD;  Location: [facility] OR;  Service: Cardiothoracic;  Laterality: Right;  FROZEN SECTION   **KRISTEN-ASSISDT**    THORACOTOMY Right 10/31/2023    Procedure: THORACOTOMY;  Surgeon: Washington Shankar MD;  Location: [facility] OR;  Service: Cardiothoracic;  Laterality: Right;    UPPER GASTROINTESTINAL ENDOSCOPY       Family History       Problem Relation (Age of Onset)    Breast cancer Maternal Grandmother, Paternal Grandmother    Cancer Mother (57), Father (56)    Colon polyps Daughter    Melanoma Brother    Skin cancer Sister, Brother, Brother          Tobacco Use    Smoking status: Former     Current packs/day: 0.00     Average packs/day: 1 pack/day for 20.0 years (20.0 ttl pk-yrs)     Types: Cigarettes     Start date:      Quit date: 1981     Years since quittin.9    Smokeless tobacco: Never    Tobacco comments:     quit 40 yrs ago   Substance and Sexual Activity    Alcohol use: Yes     Alcohol/week: 1.0 standard drink of alcohol     Types: 1 Glasses of wine per week     Comment: occasional    Drug use: No    Sexual activity: Not Currently     Partners: Male       Review of Systems   Constitutional:  Negative for fever.   Respiratory:  Negative for shortness of breath.    Cardiovascular:  Negative for chest pain and leg swelling.   Gastrointestinal:  Negative for abdominal pain and blood in stool.   Genitourinary:  Negative for hematuria.   Skin:   Negative for rash.     Objective:     Vital Signs (Most Recent):  Temp: 97.8 °F (36.6 °C) (12/05/23 0543)  Pulse: 87 (12/05/23 0543)  Resp: 18 (12/05/23 0543)  BP: 137/73 (12/05/23 0543)  SpO2: 95 % (12/05/23 0543) Vital Signs (24h Range):  Temp:  [97.8 °F (36.6 °C)-98.4 °F (36.9 °C)] 97.8 °F (36.6 °C)  Pulse:  [] 87  Resp:  [18] 18  SpO2:  [93 %-97 %] 95 %  BP: ()/(60-73) 137/73     Weight: 53.3 kg (117 lb 9.6 oz)  Body mass index is 20.83 kg/m².  Body surface area is 1.54 meters squared.      Intake/Output Summary (Last 24 hours) at 12/5/2023 0659  Last data filed at 12/4/2023 1806  Gross per 24 hour   Intake 100 ml   Output --   Net 100 ml        Physical Exam  Constitutional:       Appearance: Normal appearance.   HENT:      Head: Normocephalic and atraumatic.   Eyes:      General: No scleral icterus.     Conjunctiva/sclera: Conjunctivae normal.   Cardiovascular:      Rate and Rhythm: Normal rate.   Pulmonary:      Effort: Pulmonary effort is normal.      Breath sounds: Normal breath sounds.   Abdominal:      General: Abdomen is flat.   Neurological:      General: No focal deficit present.      Mental Status: She is alert and oriented to person, place, and time.   Psychiatric:         Mood and Affect: Mood normal.         Behavior: Behavior normal.         Thought Content: Thought content normal.         Judgment: Judgment normal.          Significant Labs:   CBC:   Recent Labs   Lab 12/03/23  1856 12/04/23  0303 12/05/23  0527   WBC 7.14 6.02 6.28   HGB 10.7* 8.8* 9.1*   HCT 32.7* 28.3* 29.3*    204 261    and CMP:   Recent Labs   Lab 12/03/23  1856 12/04/23  0303    138   K 3.5 4.3    105   CO2 22* 26   * 99   BUN 24* 27*   CREATININE 1.0 0.9   CALCIUM 8.8 8.0*   PROT 6.2 5.1*   ALBUMIN 3.4* 2.8*   BILITOT 0.5 0.5   ALKPHOS 45* 36*   AST 13 10   ALT 12 9*   ANIONGAP 11 7*       Diagnostic Results:  None

## 2023-12-05 NOTE — CARE UPDATE
12/04/23 2300   Patient Assessment/Suction   Level of Consciousness (AVPU) alert   Respiratory Effort Normal;Unlabored   Expansion/Accessory Muscles/Retractions no use of accessory muscles   PRE-TX-O2   Device (Oxygen Therapy) nasal cannula   Flow (L/min) 2   SpO2 (!) 94 %   Pulse Oximetry Type Intermittent   $ Pulse Oximetry - Single Charge Pulse Oximetry - Single   $ Pulse Oximetry - Multiple Charge Pulse Oximetry - Multiple   Pulse 85   Resp 18   Positioning Flat   Positioning   Body Position position changed independently   Head of Bed (HOB) Positioning HOB at 30-45 degrees   Education   $ Education Other (see comment);15 min   Respiratory Evaluation   $ Care Plan Tech Time 15 min   $ Eval/Re-eval Charges Evaluation

## 2023-12-05 NOTE — PLAN OF CARE
Problem: Adult Inpatient Plan of Care  Goal: Plan of Care Review  12/5/2023 0149 by Elida Deshpande RN  Outcome: Ongoing, Progressing  12/5/2023 0148 by Elida Deshpande RN  Outcome: Ongoing, Progressing  Goal: Patient-Specific Goal (Individualized)  Outcome: Ongoing, Progressing  Goal: Optimal Comfort and Wellbeing  12/5/2023 0149 by Elida Deshpande RN  Outcome: Ongoing, Progressing  12/5/2023 0148 by Elida Deshpande RN  Outcome: Ongoing, Progressing     Problem: Diabetes Comorbidity  Goal: Blood Glucose Level Within Targeted Range  12/5/2023 0149 by Elida Deshpande RN  Outcome: Ongoing, Progressing  12/5/2023 0148 by Elida Deshpande RN  Outcome: Ongoing, Progressing  Intervention: Monitor and Manage Glycemia  12/5/2023 0149 by Elida Deshpande RN  Flowsheets (Taken 12/5/2023 0149)  Glycemic Management: blood glucose monitored  12/5/2023 0148 by Elida Deshpande RN  Flowsheets (Taken 12/5/2023 0148)  Glycemic Management: blood glucose monitored     Problem: Infection  Goal: Absence of Infection Signs and Symptoms  Outcome: Ongoing, Progressing  Intervention: Prevent or Manage Infection  Flowsheets (Taken 12/5/2023 0149)  Infection Management: aseptic technique maintained     Problem: Fluid Imbalance (Pneumonia)  Goal: Fluid Balance  Outcome: Ongoing, Progressing     Problem: Infection (Pneumonia)  Goal: Resolution of Infection Signs and Symptoms  Outcome: Ongoing, Progressing  Intervention: Prevent Infection Progression  12/5/2023 0149 by Elida Deshpande RN  Flowsheets (Taken 12/5/2023 0149)  Infection Management: aseptic technique maintained  12/5/2023 0148 by Elida Deshpande RN  Flowsheets (Taken 12/5/2023 0148)  Fever Reduction/Comfort Measures: fluid intake increased     Problem: Fall Injury Risk  Goal: Absence of Fall and Fall-Related Injury  Outcome: Ongoing, Progressing  Intervention: Promote Injury-Free Environment  Flowsheets (Taken 12/5/2023 0149)  Safety Promotion/Fall Prevention:   assistive device/personal item  within reach   bed alarm set   side rails raised x 2     Problem: Fatigue  Goal: Improved Activity Tolerance  Outcome: Ongoing, Progressing  Intervention: Promote Improved Energy  Flowsheets (Taken 12/5/2023 0149)  Sleep/Rest Enhancement:   awakenings minimized   consistent schedule promoted  Activity Management: Ambulated in room - L4

## 2023-12-05 NOTE — CONSULTS
Pulmonary/Critical Care Consult      PATIENT NAME: Alexa Otero  MRN: [id number]  TODAY'S DATE: 2023  6:27 PM  ADMIT DATE: 12/3/2023  AGE: 83 y.o. 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  This was resected by Dr. Shankar and the patient was sent to follow up with Dr. Cassidy for chemotherapy considerations.  On Saturday, the patient began feeling very poorly; she was very tired very weak she had nausea and vomiting and was vomiting clear liquid.  She has been anorectic since her surgery.  On  she was too weak to get out of her chair and her  called an ambulance.  She was found to be hypoxemic and to have a significant pneumonia through the right lung with a moderate right-sided pleural effusion.  There is also some infiltrate on the left as well.  When I walked into her room, her oxygen was off and her sats were 84%.    REVIEW OF SYSTEMS  GENERAL: Feeling tired  EYES: Vision is good.  ENT: No sinusitis or pharyngitis.   HEART: No chest pain or palpitations.  LUNGS:  She is coughing, she is producing some mucus.  GI:  She remains very anorectic.  She is very thirsty.  : No dysuria, hesitancy, or nocturia.  SKIN: No lesions or rashes.  MUSCULOSKELETAL: No joint pain or myalgias.  NEURO: No headaches or neuropathy.  LYMPH: No edema or adenopathy.  PSYCH: No anxiety or depression.  ENDO: No weight change.    ALLERGIES  Review of patient's allergies indicates:  No Known Allergies    INPATIENT SCHEDULED MEDICATIONS   amiodarone  200 mg Oral BID    aspirin  81 mg Oral Daily    azithromycin  500 mg Intravenous Q24H    cefTRIAXone (ROCEPHIN) IVPB  1 g Intravenous Q24H    clopidogreL  75 mg Oral Daily    enoxparin  40 mg Subcutaneous Daily    famotidine  20 mg  Oral Daily    gabapentin  100 mg Oral BID    metoprolol succinate  25 mg Oral Daily         MEDICAL AND SURGICAL HISTORY  Past Medical History:   Diagnosis Date    Arthritis     Cardiac arrest 10/2023    Cataract     Colon polyp     Diabetes mellitus type II 2012    Encounter for blood transfusion     Extra-ovarian endometrioid adenocarcinoma 2020    GERD (gastroesophageal reflux disease)     History of chronic cough     clear productive    Hyperlipidemia     Hypertension     Macular degeneration     Ovarian cancer     PONV (postoperative nausea and vomiting)     Squamous cell carcinoma 1980's    precancer of cervix     Past Surgical History:   Procedure Laterality Date    AUGMENTATION OF BREAST      BRONCHOSCOPY WITH FLUOROSCOPY Right 05/11/2023    Procedure: BRONCHOSCOPY, WITH FLUOROSCOPY;  Surgeon: Neva Christian MD;  Location: [facility];  Service: Pulmonary;  Laterality: Right;    CATARACT EXTRACTION BILATERAL W/ ANTERIOR VITRECTOMY Bilateral     CHOLECYSTECTOMY      COLONOSCOPY      COLONOSCOPY N/A 04/20/2023    Procedure: COLONOSCOPY;  Surgeon: Sabino Stephenson MD;  Location: [facility];  Service: Endoscopy;  Laterality: N/A;    CORONARY STENT PLACEMENT  10/2023    x 3    ESOPHAGOGASTRODUODENOSCOPY N/A 06/20/2018    Procedure: EGD (ESOPHAGOGASTRODUODENOSCOPY);  Surgeon: Sabino Stephenson MD;  Location: [facility];  Service: Endoscopy;  Laterality: N/A;    ESOPHAGOGASTRODUODENOSCOPY N/A 03/31/2023    Procedure: EGD (ESOPHAGOGASTRODUODENOSCOPY);  Surgeon: Sabino Stephenson MD;  Location: [facility];  Service: Endoscopy;  Laterality: N/A;    HYSTERECTOMY  1976    partial    INJECTION OF ANESTHETIC AGENT AROUND MEDIAL BRANCH NERVES INNERVATING LUMBAR FACET JOINT Right 05/10/2023    Procedure: Block-nerve-Lateral-branch-lumbar;  Surgeon: Agustin Robles MD;  [facility];  Service: Pain Management;  Laterality: Right;  L5 and s1,s2 LBB    INJECTION OF ANESTHETIC AGENT AROUND MEDIAL BRANCH NERVES INNERVATING  LUMBAR FACET JOINT Right 05/30/2023    Procedure: Block-nerve-medial branch-lumbar;  Surgeon: Agustin Robles MD;  Location: [facility] OR;  Service: Pain Management;  Laterality: Right;  L5, s1 ,s2 LBB #2    INJECTION OF ANESTHETIC AGENT AROUND NERVE Right 10/31/2023    Procedure: INTERCOSTAL NERVE BLOCK;  Surgeon: Washington Shankar MD;  Location: [facility] OR;  Service: Cardiothoracic;  Laterality: Right;    INJECTION, SACROILIAC JOINT Right 01/26/2023    Procedure: INJECTION,SACROILIAC JOINT;  Surgeon: Agustin Robles MD;  Location: [facility] OR;  Service: Pain Management;  Laterality: Right;    INSERTION OF TUNNELED CENTRAL VENOUS CATHETER (CVC) WITH SUBCUTANEOUS PORT Right 10/19/2020    Procedure: INSERTION, PORT-A-CATH;  Surgeon: Misti Mcfarland MD;  Location: [facility];  Service: General;  Laterality: Right;    INTRAMEDULLARY RODDING OF TROCHANTER OF FEMUR Right 11/28/2021    Procedure: INSERTION, INTRAMEDULLARY LUC, FEMUR, TROCHANTER/RIGHT TFN DR FAJARDO NOTIFIED REP;  Surgeon: Kp Fajardo MD;  Location: [facility];  Service: Orthopedics;  Laterality: Right;  SKIP    ROBOT-ASSISTED LAPAROSCOPIC LYMPHADENECTOMY USING DA NELLA XI N/A 09/21/2020    Procedure: XI ROBOTIC LYMPHADENECTOMY-pelvic and kell-aortic;  Surgeon: Altagracia Ray MD;  Location: [facility] OR;  Service: OB/GYN;  Laterality: N/A;    ROBOT-ASSISTED LAPAROSCOPIC OMENTECTOMY USING DA NELLA XI N/A 09/21/2020    Procedure: XI ROBOTIC OMENTECTOMY;  Surgeon: Altagracia Ray MD;  Location: [facility] OR;  Service: OB/GYN;  Laterality: N/A;    ROBOT-ASSISTED LAPAROSCOPIC SALPINGO-OOPHORECTOMY USING DA NELLA XI Bilateral 09/21/2020    Procedure: XI ROBOTIC SALPINGO-OOPHORECTOMY;  Surgeon: Altagracia Ray MD;  Location: [facility] OR;  Service: OB/GYN;  Laterality: Bilateral;    THORACOSCOPIC BIOPSY OF PLEURA Right 10/31/2023    Procedure: VATS, WITH PLEURA BIOPSY;  Surgeon: Washington Shankar MD;  Location: [facility] OR;  Service: Cardiothoracic;  Laterality: Right;  FROZEN SECTION    **KRISTEN-ASSISDT**    THORACOTOMY Right 10/31/2023    Procedure: THORACOTOMY;  Surgeon: Washington Shankar MD;  Location: [facility];  Service: Cardiothoracic;  Laterality: Right;    UPPER GASTROINTESTINAL ENDOSCOPY         ALCOHOL, TOBACCO AND DRUG USE  Social History     Tobacco Use   Smoking Status Former    Current packs/day: 0.00    Average packs/day: 1 pack/day for 20.0 years (20.0 ttl pk-yrs)    Types: Cigarettes    Start date:     Quit date:     Years since quittin.9   Smokeless Tobacco Never   Tobacco Comments    quit 40 yrs ago     Social History     Substance and Sexual Activity   Alcohol Use Yes    Alcohol/week: 1.0 standard drink of alcohol    Types: 1 Glasses of wine per week    Comment: occasional     Social History     Substance and Sexual Activity   Drug Use No       FAMILY HISTORY  Family History   Problem Relation Age of Onset    Cancer Mother 57        lung    Cancer Father 56        oral    Skin cancer Sister     Skin cancer Brother     Melanoma Brother     Skin cancer Brother     Breast cancer Maternal Grandmother     Breast cancer Paternal Grandmother     Colon polyps Daughter     Psoriasis Neg Hx     Lupus Neg Hx     Eczema Neg Hx     Colon cancer Neg Hx     Crohn's disease Neg Hx     Esophageal cancer Neg Hx     Stomach cancer Neg Hx     Ulcerative colitis Neg Hx        VITAL SIGNS (MOST RECENT)  Temp: 98.4 °F (36.9 °C) (23 161)  Pulse: 86 (23 1612)  Resp: 18 (23 161)  BP: 96/60 (23 1030)  SpO2: (!) 93 % (notified nurse geo) (23 161)    INTAKE AND OUTPUT (LAST 24 HOURS):  Intake/Output Summary (Last 24 hours) at 2023 1827  Last data filed at 12/3/2023 2148  Gross per 24 hour   Intake 1250 ml   Output --   Net 1250 ml       WEIGHT  Wt Readings from Last 1 Encounters:   23 53.3 kg (117 lb 9.6 oz)       PHYSICAL EXAM  GENERAL: Older patient in no distress, looking tired  HEENT: Pupils equal and reactive. Extraocular movements intact.  Nose intact. Pharynx moist.  NECK: Supple.   HEART: Regular rate and rhythm. No murmur or gallop auscultated.  LUNGS:  There are crackles in the right posterior lung fields these extend laterally as well.  Lung excursion symmetrical. No change in fremitus.   ABDOMEN: Bowel sounds present. Non-tender, no masses palpated.  : Normal anatomy.  EXTREMITIES: Normal muscle tone and joint movement, no cyanosis or clubbing.   LYMPHATICS: No adenopathy palpated, no edema.  SKIN: Dry, intact, no lesions.   NEURO: Cranial nerves II-XII intact. Motor strength 5/5 bilaterally, upper and lower extremities.  PSYCH: Appropriate affect      CBC LAST (LAST 24 HOURS)  Recent Labs   Lab 12/04/23  0303   WBC 6.02   RBC 3.11*   HGB 8.8*   HCT 28.3*   MCV 91   MCH 28.3   MCHC 31.1*   RDW 14.8*      MPV 9.6   GRAN 51.0   LYMPH 18.0   MONO 0.0*   NRBC 0       CHEMISTRY LAST (LAST 24 HOURS)  Recent Labs   Lab 12/04/23  0303      K 4.3      CO2 26   ANIONGAP 7*   BUN 27*   CREATININE 0.9   GLU 99   CALCIUM 8.0*   MG 1.3*   ALBUMIN 2.8*   PROT 5.1*   ALKPHOS 36*   ALT 9*   AST 10   BILITOT 0.5       COAGULATION LAST (LAST 24 HOURS)  Recent Labs   Lab 12/03/23  1856   INR 1.3*       CARDIAC PROFILE (LAST 24 HOURS)  Recent Labs   Lab 12/03/23  1856 12/04/23  0303   *  --    TROPONINIHS 16.6* 14.4       LAST 7 DAYS MICROBIOLOGY   Microbiology Results (last 7 days)       Procedure Component Value Units Date/Time    Culture, Respiratory with Gram Stain [[id number]]     Order Status: No result Specimen: Respiratory     MRSA Screen by PCR [[id number]] Collected: 12/04/23 1051    Order Status: Completed Specimen: Nasopharyngeal Swab from Nasal Updated: 12/04/23 1312     MRSA SCREEN BY PCR Negative    Respiratory Infection Panel (PCR), Nasopharyngeal [[id number]] Collected: 12/04/23 0758    Order Status: Completed Specimen: Nasopharyngeal Swab Updated: 12/04/23 0948     Respiratory Infection Panel Source NP swab      Adenovirus Not Detected     Coronavirus 229E, Common Cold Virus Not Detected     Coronavirus HKU1, Common Cold Virus Not Detected     Coronavirus NL63, Common Cold Virus Not Detected     Coronavirus OC43, Common Cold Virus Not Detected     Comment: Coronavirus strains 229E, HKU1, NL63, and OC43 can cause the common   cold   and are not associated with the respiratory disease outbreak caused   by  the COVID-19 (SARS-CoV-2 novel Coronavirus) strain.           SARS-CoV2 (COVID-19) Qualitative PCR Not Detected     Human Metapneumovirus Not Detected     Human Rhinovirus/Enterovirus Not Detected     Influenza A (subtypes H1, H1-2009,H3) Not Detected     Influenza B Not Detected     Parainfluenza Virus 1 Not Detected     Parainfluenza Virus 2 Not Detected     Parainfluenza Virus 3 Not Detected     Parainfluenza Virus 4 Not Detected     Respiratory Syncytial Virus Not Detected     Bordetella Parapertussis (TE2153) Not Detected     Bordetella pertussis (ptxP) Not Detected     Chlamydia pneumoniae Not Detected     Mycoplasma pneumoniae Not Detected     Comment: Respiratory Infection Panel testing performed by Multiplex PCR.       Narrative:      Respiratory Infection Panel source->NP Swab    Blood culture [[id number]] Collected: 12/03/23 2123    Order Status: Completed Specimen: Blood Updated: 12/04/23 0358     Blood Culture, Routine No Growth to date    Blood culture [[id number]] Collected: 12/03/23 2108    Order Status: Completed Specimen: Blood from Antecubital, Left Updated: 12/04/23 0358     Blood Culture, Routine No Growth to date            MOST RECENT IMAGING  CTA Chest Non-Coronary (PE Studies)  All CT scans at this facility used dose modulation, iterative reconstruction and/or weight-based dosing when appropriate to reduce radiation doses  as low as reasonably achievable.    HISTORY: Lung cancer, fever, history of PE., Ovarian cancer    COMPARISON: PET/CT dated 8/15/2023    FINDINGS: Thin axial imaging through the  chest was performed with 100 mL Omnipaque 350 IV contrast, with sagittal and coronal reformatted images and 3-D reconstructions performed on an independent workstation, with images stored in the patient's permanent electronic medical record.    This is a high-grade quality study for the evaluation of pulmonary embolism. The pulmonary arteries are normal in appearance without pulmonary emboli noted up to the subsegmental level, noting limitations of CT technique for identifying small isolated subsegmental emboli.    The aorta is normal in caliber without aneurysm or dissection. The heart is normal in size. There is multivessel coronary artery calcification.    LUNGS: There is a moderate size right pleural effusion. There are diffuse groundglass alveolar opacities in the right upper lobe and patchy groundglass opacities in the left upper lobe and left lower lobe.    There is improvement of the alveolar consolidation in the right lung base.  There are emphysematous changes. The trachea and bronchi are normal.    Mediastinum: There is a right IJ Port-A-Cath with the tip in the superior vena cava. There is no hilar, mediastinal or axillary adenopathy.    There are bilateral breast implants. The chest wall is otherwise unremarkable.  The esophagus is normal.  There are degenerative changes of the spine. There are no lytic or blastic lesions.    The visualized portion of the upper abdomen demonstrates a stable simple cysts in the right lobe of the liver. The adrenal glands are normal.    IMPRESSION: No CT evidence pulmonary emboli    Development of moderate size right pleural effusion with diffuse alveolar opacities in the right upper lobe and patchy groundglass opacities in the left upper lobe and left lung base suggestive of multifocal pneumonia    Improvement of the dense alveolar consolidation in the right lower lobe  Coronary artery calcification with diffuse vascular calcification of aorta    Electronically signed  by:  Chichi Gann MD  12/04/2023 01:03 PM CST Workstation: [id number]  X-Ray Chest AP Portable  Chest, single view    HISTORY: Dyspnea.    Comparison is made to the preceding day's examination.    The cardiac silhouette and central pulmonary vasculature are stable. A port-type right-sided central venous catheter is unchanged in position.    Patchy diffuse right-sided interstitial/airspace opacities persist with slight progression in consolidation in the right lung base in the interval. There is a persistent small right-sided pleural effusion which has likewise increased slightly. The left lung is free of confluent infiltrate or significant volume loss.    IMPRESSION:    Persistent diffuse right-sided interstitial and airspace opacities with slight progression of the opacification at the right lung base and increase in small right-sided effusion.    Electronically signed by:  Izabel Ann MD  12/04/2023 08:24 AM CST Workstation: [id number]      CURRENT VISIT EKG  Results for orders placed or performed during the hospital encounter of 12/03/23   EKG 12-lead    Narrative    Test Reason : R00.0,    Vent. Rate : 112 BPM     Atrial Rate : 112 BPM     P-R Int : 114 ms          QRS Dur : 058 ms      QT Int : 346 ms       P-R-T Axes : 031 015 053 degrees     QTc Int : 472 ms    Sinus tachycardia  Junctional ST depression, probably normal  Borderline Abnormal ECG  When compared with ECG of 03-NOV-2023 09:58,  Sinus rhythm has replaced Atrial fibrillation  Criteria for Anterior infarct are no longer Present  ST now depressed in Anterior leads  Nonspecific T wave abnormality, improved in Lateral leads    Referred By: [email or web address]   SELF           Confirmed By:        ECHOCARDIOGRAM RESULTS  Results for orders placed during the hospital encounter of 10/13/23    Echo    Interpretation Summary    Left Ventricle: The left ventricle is normal in size. Normal wall thickness. Normal wall motion. There is normal systolic function with  "a visually estimated ejection fraction of 55 - 60%. Grade I diastolic dysfunction.    Right Ventricle: Normal right ventricular cavity size. Wall thickness is normal. Right ventricle wall motion  is normal. Systolic function is normal.    Aortic Valve: There is moderate aortic valve sclerosis.    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 There is mild mitral annular calcification present. There is no stenosis. The mean pressure gradient across the mitral valve is 3 mmHg at a heart rate of  bpm. There is mild regurgitation.    Tricuspid Valve: There is mild regurgitation.  No pulmonary hypertension.    IVC/SVC: Normal venous pressure at 3 mmHg.        Oxygen INFORMATION     2 L         LAST ARTERIAL BLOOD GAS  ABG  No results for input(s): "PH", "PO2", "PCO2", "HCO3", "BE" in the last 168 hours.    IMPRESSION AND PLAN  Multilobar pneumonia  - on Rocephin and azithromycin  - hopefully this is not spread from her pneumonic adenocarcinoma, however her symptoms are of an acute process  Significant right pleural effusion  - will perform thoracentesis in the morning, given Lovenox at 5:00 p.m., to rule out malignant effusion and empyema and improve her oxygenation  Acute hypoxemic respiratory failure  - not surprising given the amount of parenchymal compression and involvement  Right lower lobe resection with pathology showing a pneumonic pattern adenocarcinoma  Prior PET scan showed small nodules elsewhere in the lungs that were not hypermetabolic but were very small and still worrisome  - Dr. Cassidy is following  Anemia, acute and chronic illness  Prerenal azotemia  Hypomagnesemia  - 4 g given today  - trend  Moderate hypoalbuminemia/malnutrition  - patient has lost 7 lb since surgery    Neva Christian MD  Date of Service: 12/04/2023  6:27 PM   "

## 2023-12-05 NOTE — PROCEDURES
Thoracentesis    Indication:  Pneumonia and R pleural effusion, rule out empyema  Medications:  1% xylocaine  Specimens: Pleural fluid  Complications:  None    The patient was consented. An appropriate timeout was taken. Ultrasound was utilized to locate the more inferior extent of the R pleural effusion. The 8th interspace in the posterior axillary line was prepped and draped in the usual sterile fashion. The area was infused with 5 cc of 1% Xylocaine. A #11 blade was used to incise the skin. The catheter was introduced without difficulty. 650 cc of dark ivette fluid was removed. The fluid was sent for studies. The patient tolerated the procedure well. The procedure was stopped when the patient began experiencing pleurisy.  A post procedure chest x-ray has been ordered.

## 2023-12-05 NOTE — PROGRESS NOTES
Pulmonary/Critical Care Progress Note      PATIENT NAME: Alexa Otero  MRN: [id number]  TODAY'S DATE: 2023  6:27 PM  ADMIT DATE: 12/3/2023  AGE: 83 y.o. 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  This was resected by Dr. Shankar and the patient was sent to follow up with Dr. Cassidy for chemotherapy considerations.  On Saturday, the patient began feeling very poorly; she was very tired very weak she had nausea and vomiting and was vomiting clear liquid.  She has been anorectic since her surgery.  On  she was too weak to get out of her chair and her  called an ambulance.  She was found to be hypoxemic and to have a significant pneumonia through the right lung with a moderate right-sided pleural effusion.  There is also some infiltrate on the left as well.  When I walked into her room, her oxygen was off and her sats were 84%.     the patient is very weak.  She is coughing infrequently and not coughing up any mucus.  She states that she continued to cough up the same mucus she had been doing prior to her surgery, after her surgery but it has gradually lightened and 4 days ago it stopped.  Her thoracentesis revealed clear yellow fluid.  The drainage was stopped at 650 cc as she began developing pleurisy.  The postprocedure chest x-ray shows good elevation of the right hemidiaphragm and some shifting of the mediastinum and persistence of a lateral lower thorax alveolar infiltrate.    REVIEW OF SYSTEMS  GENERAL: Feeling tired  EYES: Vision is good.  ENT: No sinusitis or pharyngitis.   HEART: No chest pain or palpitations.  LUNGS:  She is coughing, she is not producing any mucus.  GI:  She remains very anorectic.   : No dysuria, hesitancy, or  nocturia.  SKIN: No lesions or rashes.  MUSCULOSKELETAL: No joint pain or myalgias.  NEURO: No headaches or neuropathy.  LYMPH: No edema or adenopathy.  PSYCH: No anxiety or depression.  ENDO: No weight change.    No change in the patient's Past Medical History, Past Surgical History, Social History or Family History since admission.    VITAL SIGNS (MOST RECENT)  Temp: 97.3 °F (36.3 °C) (12/05/23 0727)  Pulse: 99 (12/05/23 0820)  Resp: 18 (12/05/23 0820)  BP: 138/65 (12/05/23 0727)  SpO2: 96 % (12/05/23 0820)    INTAKE AND OUTPUT (LAST 24 HOURS):  Intake/Output Summary (Last 24 hours) at 12/5/2023 1019  Last data filed at 12/4/2023 1806  Gross per 24 hour   Intake 100 ml   Output --   Net 100 ml       WEIGHT  Wt Readings from Last 1 Encounters:   12/03/23 53.3 kg (117 lb 9.6 oz)       PHYSICAL EXAM  GENERAL: Older patient in no distress, looking tired  HEENT: Pupils equal and reactive. Extraocular movements intact. Nose intact. Pharynx moist.  NECK: Supple.   HEART: Regular rate and rhythm. No murmur or gallop auscultated.  LUNGS:  There are bibasilar crackles, right greater than left.  Lung excursion symmetrical. No change in fremitus.   ABDOMEN: Bowel sounds present. Non-tender, no masses palpated.  : Normal anatomy.  EXTREMITIES: Normal muscle tone and joint movement, no cyanosis or clubbing.   LYMPHATICS: No adenopathy palpated, no edema.  SKIN: Dry, intact, no lesions.   NEURO: Cranial nerves II-XII intact. Motor strength 5/5 bilaterally, upper and lower extremities.  PSYCH: Appropriate affect      CBC LAST (LAST 24 HOURS)  Recent Labs   Lab 12/05/23 0527   WBC 6.28   RBC 3.18*   HGB 9.1*   HCT 29.3*   MCV 92   MCH 28.6   MCHC 31.1*   RDW 15.1*      MPV 10.1   GRAN 75.5*  4.7   LYMPH 10.8*  0.7*   MONO 9.9  0.6   BASO 0.02   NRBC 0       CHEMISTRY LAST (LAST 24 HOURS)  Recent Labs   Lab 12/05/23  0527      K 4.2      CO2 26   ANIONGAP 7*   BUN 26*   CREATININE 1.1      CALCIUM  8.4*   MG 2.6   ALBUMIN 2.9*   PROT 5.7*   ALKPHOS 47*   ALT 10   AST 9*   BILITOT 0.4           CARDIAC PROFILE (LAST 24 HOURS)  Recent Labs   Lab 12/03/23  1856 12/04/23  0303 12/05/23  0527   *  --   --    LDH  --   --  147   TROPONINIHS 16.6* 14.4  --        LAST 7 DAYS MICROBIOLOGY   Microbiology Results (last 7 days)       Procedure Component Value Units Date/Time    AFB culture (includes stain) [[id number]] Collected: 12/05/23 0945    Order Status: Sent Specimen: Body Fluid from Lung, Right Updated: 12/05/23 1005    Fungus culture [[id number]] Collected: 12/05/23 0945    Order Status: Sent Specimen: Body Fluid from Lung, Right Updated: 12/05/23 1005    Blood culture [[id number]] Collected: 12/03/23 2108    Order Status: Completed Specimen: Blood from Antecubital, Left Updated: 12/04/23 2232     Blood Culture, Routine No Growth to date      No Growth to date    Blood culture [[id number]] Collected: 12/03/23 2123    Order Status: Completed Specimen: Blood Updated: 12/04/23 2232     Blood Culture, Routine No Growth to date      No Growth to date    Culture, Respiratory with Gram Stain [[id number]]     Order Status: No result Specimen: Respiratory     Culture, Respiratory with Gram Stain [[id number]]     Order Status: No result Specimen: Respiratory     MRSA Screen by PCR [[id number]] Collected: 12/04/23 1051    Order Status: Completed Specimen: Nasopharyngeal Swab from Nasal Updated: 12/04/23 1312     MRSA SCREEN BY PCR Negative    Respiratory Infection Panel (PCR), Nasopharyngeal [[id number]] Collected: 12/04/23 0758    Order Status: Completed Specimen: Nasopharyngeal Swab Updated: 12/04/23 0948     Respiratory Infection Panel Source NP swab     Adenovirus Not Detected     Coronavirus 229E, Common Cold Virus Not Detected     Coronavirus HKU1, Common Cold Virus Not Detected     Coronavirus NL63, Common Cold Virus Not Detected     Coronavirus OC43, Common Cold Virus Not Detected     Comment: Coronavirus  strains 229E, HKU1, NL63, and OC43 can cause the common   cold   and are not associated with the respiratory disease outbreak caused   by  the COVID-19 (SARS-CoV-2 novel Coronavirus) strain.           SARS-CoV2 (COVID-19) Qualitative PCR Not Detected     Human Metapneumovirus Not Detected     Human Rhinovirus/Enterovirus Not Detected     Influenza A (subtypes H1, H1-2009,H3) Not Detected     Influenza B Not Detected     Parainfluenza Virus 1 Not Detected     Parainfluenza Virus 2 Not Detected     Parainfluenza Virus 3 Not Detected     Parainfluenza Virus 4 Not Detected     Respiratory Syncytial Virus Not Detected     Bordetella Parapertussis (DK1266) Not Detected     Bordetella pertussis (ptxP) Not Detected     Chlamydia pneumoniae Not Detected     Mycoplasma pneumoniae Not Detected     Comment: Respiratory Infection Panel testing performed by Multiplex PCR.       Narrative:      Respiratory Infection Panel source->NP Swab            MOST RECENT IMAGING  CTA Chest Non-Coronary (PE Studies)  All CT scans at this facility used dose modulation, iterative reconstruction and/or weight-based dosing when appropriate to reduce radiation doses  as low as reasonably achievable.    HISTORY: Lung cancer, fever, history of PE., Ovarian cancer    COMPARISON: PET/CT dated 8/15/2023    FINDINGS: Thin axial imaging through the chest was performed with 100 mL Omnipaque 350 IV contrast, with sagittal and coronal reformatted images and 3-D reconstructions performed on an independent workstation, with images stored in the patient's permanent electronic medical record.    This is a high-grade quality study for the evaluation of pulmonary embolism. The pulmonary arteries are normal in appearance without pulmonary emboli noted up to the subsegmental level, noting limitations of CT technique for identifying small isolated subsegmental emboli.    The aorta is normal in caliber without aneurysm or dissection. The heart is normal in size.  There is multivessel coronary artery calcification.    LUNGS: There is a moderate size right pleural effusion. There are diffuse groundglass alveolar opacities in the right upper lobe and patchy groundglass opacities in the left upper lobe and left lower lobe.    There is improvement of the alveolar consolidation in the right lung base.  There are emphysematous changes. The trachea and bronchi are normal.    Mediastinum: There is a right IJ Port-A-Cath with the tip in the superior vena cava. There is no hilar, mediastinal or axillary adenopathy.    There are bilateral breast implants. The chest wall is otherwise unremarkable.  The esophagus is normal.  There are degenerative changes of the spine. There are no lytic or blastic lesions.    The visualized portion of the upper abdomen demonstrates a stable simple cysts in the right lobe of the liver. The adrenal glands are normal.    IMPRESSION: No CT evidence pulmonary emboli    Development of moderate size right pleural effusion with diffuse alveolar opacities in the right upper lobe and patchy groundglass opacities in the left upper lobe and left lung base suggestive of multifocal pneumonia    Improvement of the dense alveolar consolidation in the right lower lobe  Coronary artery calcification with diffuse vascular calcification of aorta    Electronically signed by:  Chichi Gann MD  12/04/2023 01:03 PM CST Workstation: [id number]  X-Ray Chest AP Portable  Chest, single view    HISTORY: Dyspnea.    Comparison is made to the preceding day's examination.    The cardiac silhouette and central pulmonary vasculature are stable. A port-type right-sided central venous catheter is unchanged in position.    Patchy diffuse right-sided interstitial/airspace opacities persist with slight progression in consolidation in the right lung base in the interval. There is a persistent small right-sided pleural effusion which has likewise increased slightly. The left lung is free of  confluent infiltrate or significant volume loss.    IMPRESSION:    Persistent diffuse right-sided interstitial and airspace opacities with slight progression of the opacification at the right lung base and increase in small right-sided effusion.    Electronically signed by:  Izabel Ann MD  12/04/2023 08:24 AM CST Workstation: [id number]      CURRENT VISIT EKG  Results for orders placed or performed during the hospital encounter of 12/03/23   EKG 12-lead    Narrative    Test Reason : R00.0,    Vent. Rate : 112 BPM     Atrial Rate : 112 BPM     P-R Int : 114 ms          QRS Dur : 058 ms      QT Int : 346 ms       P-R-T Axes : 031 015 053 degrees     QTc Int : 472 ms    Sinus tachycardia  Junctional ST depression, probably normal  Borderline Abnormal ECG  When compared with ECG of 03-NOV-2023 09:58,  Sinus rhythm has replaced Atrial fibrillation  Criteria for Anterior infarct are no longer Present  ST now depressed in Anterior leads  Nonspecific T wave abnormality, improved in Lateral leads    Referred By: [email or web address]   SELF           Confirmed By:        ECHOCARDIOGRAM RESULTS  Results for orders placed during the hospital encounter of 10/13/23    Echo    Interpretation Summary    Left Ventricle: The left ventricle is normal in size. Normal wall thickness. Normal wall motion. There is normal systolic function with a visually estimated ejection fraction of 55 - 60%. Grade I diastolic dysfunction.    Right Ventricle: Normal right ventricular cavity size. Wall thickness is normal. Right ventricle wall motion  is normal. Systolic function is normal.    Aortic Valve: There is moderate aortic valve sclerosis.    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 There is mild mitral annular calcification present. There is no stenosis. The mean pressure gradient across the mitral valve is 3 mmHg at a heart rate of  bpm. There is mild regurgitation.    Tricuspid Valve: There is mild regurgitation.  No pulmonary  "hypertension.    IVC/SVC: Normal venous pressure at 3 mmHg.        Oxygen INFORMATION     2 L         LAST ARTERIAL BLOOD GAS  ABG  No results for input(s): "PH", "PO2", "PCO2", "HCO3", "BE" in the last 168 hours.    IMPRESSION AND PLAN  Multilobar pneumonia  - on Rocephin and azithromycin  - hopefully this is not spread from her pneumonic adenocarcinoma, however her symptoms are of an acute process  Significant right pleural effusion  - 650 cc of bland appearing pleural fluid removed.  Thoracentesis stopped secondary to pleurisy   - await analysis of the pleural fluid  Acute hypoxemic respiratory failure  - not surprising given the amount of parenchymal compression and involvement  Right lower lobe resection with pathology showing a pneumonic pattern adenocarcinoma  Prior PET scan showed small nodules elsewhere in the lungs that were not hypermetabolic but were very small and still worrisome  - Dr. Cassidy is following  Anemia, acute and chronic illness  - stable  Prerenal azotemia  Hypomagnesemia  - treated  Moderate hypoalbuminemia/malnutrition  - patient has lost 7 lb since surgery    Neva Christian MD  Date of Service: 12/05/2023  6:27 PM   "

## 2023-12-05 NOTE — SUBJECTIVE & OBJECTIVE
Interval History:  Patient is on 1 L nasal cannula.  Thoracentesis completed this morning with 650 cc of fluid removed.    Review of Systems   Constitutional:  Positive for chills and fatigue.   HENT:  Negative for congestion and ear pain.    Respiratory:  Negative for chest tightness and shortness of breath.    Cardiovascular:  Negative for chest pain and palpitations.   Gastrointestinal:  Positive for diarrhea and nausea. Negative for abdominal distention and abdominal pain.   Genitourinary:  Negative for difficulty urinating and dysuria.   Musculoskeletal:  Negative for arthralgias and back pain.   Neurological:  Negative for dizziness.   Psychiatric/Behavioral:  Negative for agitation and behavioral problems.      Objective:     Vital Signs (Most Recent):  Temp: 97.7 °F (36.5 °C) (12/05/23 1114)  Pulse: 85 (12/05/23 1114)  Resp: 18 (12/05/23 1114)  BP: 114/61 (12/05/23 1114)  SpO2: 95 % (12/05/23 1114) Vital Signs (24h Range):  Temp:  [97.3 °F (36.3 °C)-98.4 °F (36.9 °C)] 97.7 °F (36.5 °C)  Pulse:  [84-99] 85  Resp:  [18] 18  SpO2:  [93 %-96 %] 95 %  BP: (105-138)/(61-73) 114/61     Weight: 53.3 kg (117 lb 9.6 oz)  Body mass index is 20.83 kg/m².    Intake/Output Summary (Last 24 hours) at 12/5/2023 1135  Last data filed at 12/4/2023 1806  Gross per 24 hour   Intake 100 ml   Output --   Net 100 ml           Physical Exam  Vitals reviewed.   Constitutional:       Appearance: She is ill-appearing.   HENT:      Head: Normocephalic and atraumatic.      Right Ear: External ear normal.      Left Ear: External ear normal.      Mouth/Throat:      Mouth: Mucous membranes are dry.   Cardiovascular:      Rate and Rhythm: Regular rhythm. Tachycardia present.   Pulmonary:      Breath sounds: Rhonchi present.   Chest:      Chest wall: No tenderness.   Abdominal:      General: There is no distension.      Tenderness: There is no abdominal tenderness.   Musculoskeletal:         General: Normal range of motion.   Skin:      General: Skin is warm and dry.   Neurological:      General: No focal deficit present.      Mental Status: She is alert and oriented to person, place, and time.             Significant Labs: All pertinent labs within the past 24 hours have been reviewed.  Recent Lab Results  (Last 5 results in the past 24 hours)        12/05/23  1115   12/05/23  0945   12/05/23  0738   12/05/23  0527   12/04/23 2021        Albumin       2.9         ALP       47         ALT       10         Anion Gap       7         AST       9         Baso #       0.02         Basophil %       0.3         BILIRUBIN TOTAL       0.4  Comment: For infants and newborns, interpretation of results should be based  on gestational age, weight and in agreement with clinical  observations.    Premature Infant recommended reference ranges:  Up to 24 hours.............<8.0 mg/dL  Up to 48 hours............<12.0 mg/dL  3-5 days..................<15.0 mg/dL  6-29 days.................<15.0 mg/dL           Body Fluid Source, Glucose   Thoracentesis             Body Fluid Source, LDH   Thoracentesis             Body Fluid Source, Total Protein   Thoracentesis             Body Fluid, Protein   <3.0  Comment: The reference range and other method performance specifications  have not been established for this test in fluids other than   plasma, serum, CSF, and urine. The test result should be integrated   into the clinical context for proper interpretation.               BUN       26         Calcium       8.4         Chloride       103         CO2       26         Creatinine       1.1         Differential Method       Automated         eGFR       50.2         Eos #       0.1         Eosinophil %       2.2         Glucose       101         Glucose, Fluid   107  Comment: The reference range and other method performance specifications  have not been established for this test in fluids other than   plasma, serum, CSF, and urine. The test result should be integrated   into  the clinical context for proper interpretation.               Gran # (ANC)       4.7         Gran %       75.5         Hematocrit       29.3         Hemoglobin       9.1         Immature Grans (Abs)       0.08  Comment: Mild elevation in immature granulocytes is non specific and   can be seen in a variety of conditions including stress response,   acute inflammation, trauma and pregnancy. Correlation with other   laboratory and clinical findings is essential.           Immature Granulocytes       1.3         LD       147  Comment: Results are increased in hemolyzed samples.         LD, Fluid   107  Comment: The reference range and other method performance specifications  have not been established for this test in fluids other than   plasma, serum, CSF, and urine. The test result should be integrated   into the clinical context for proper interpretation.               Lymph #       0.7         Lymph %       10.8         Magnesium        2.6         MCH       28.6         MCHC       31.1         MCV       92         Mono #       0.6         Mono %       9.9         MPV       10.1         nRBC       0         Platelet Count       261         POC Glucose 145     110     159       Potassium       4.2         PROTEIN TOTAL       5.7         RBC       3.18         RDW       15.1         Sodium       136         WBC       6.28                                Significant Imaging: I have reviewed all pertinent imaging results/findings within the past 24 hours.

## 2023-12-05 NOTE — ASSESSMENT & PLAN NOTE
Pneumonia:  Patient appears to have a multifocal pneumonia and is on azithro/ceftriaxone at this time.  She is currently stable and will leave further decision on abx therapy to primary team.      Lung cancer:  This appears to not be playing a role at this time but will certainly need to re-image her once she has completed therapy for this pneumonia.  She has not had any chemo or radiation therapy at this time but this is being considered for the near future(chemo).     Will follow her up in clinic upon discharge.  Will sign off but please call if there are any other unanswered questions or issues.

## 2023-12-05 NOTE — CONSULTS
"[facility]  Hematology/Oncology  Consult Note    Patient Name: Alexa Otero  MRN: [id number]  Admission Date: 12/3/2023  Hospital Length of Stay: 1 days  Code Status: Full Code   Attending Provider: Garrett Jones MD  Consulting Provider: Virgil Lloyd MD  Primary Care Physician: Idalia Greco MD  Principal Problem:Pneumonia    Consults  Subjective:     HPI:   is a 82yo female whom I met last week in consultation for a new diagnosis of Stage II lung cancer.  She underwent RLL lobectomy on 10/31/2023 and was seen by me for recommendations on adjuvant therapy.   She has not undergone any chemo or radiation therapy at this time.  She presented to the ED with a cc of profound fatigue and weakness which occurred upon waking on Sunday morning.  She underwent CTA of the chest which did not show a PE but did show the following:    "Development of moderate size right pleural effusion with diffuse alveolar opacities in the right upper lobe and patchy groundglass opacities in the left upper lobe and left lung base suggestive of multifocal pneumonia "    Blood and respiratory cultures are negative at this time and her high sensitivity troponin was slightly elevated at 16 on admission.  Note is also made of her elevated BNP of over 600.  (Echo 10/13/2023 showed normal EF of 55-60%)    She had a temp of 100.5 at admit but has been afebrile since this time and her BP has been stable.      Oncology Treatment Plan:   [No matching plan found]    Medications:  Continuous Infusions:  Scheduled Meds:   amiodarone  200 mg Oral BID    aspirin  81 mg Oral Daily    azithromycin  500 mg Intravenous Q24H    cefTRIAXone (ROCEPHIN) IVPB  1 g Intravenous Q24H    clopidogreL  75 mg Oral Daily    enoxparin  40 mg Subcutaneous Daily    famotidine  20 mg Oral Daily    gabapentin  100 mg Oral BID    metoprolol succinate  25 mg Oral Daily    mupirocin   Nasal BID     PRN Meds:acetaminophen, dextrose 50%, dextrose 50%, " glucagon (human recombinant), glucose, glucose, HYDROcodone-acetaminophen, HYDROcodone-acetaminophen, insulin aspart U-100, melatonin, ondansetron, sodium chloride 0.9%, sodium chloride 0.9%     Review of patient's allergies indicates:  No Known Allergies     Past Medical History:   Diagnosis Date    Arthritis     Cardiac arrest 10/2023    Cataract     Colon polyp     Diabetes mellitus type II 2012    Encounter for blood transfusion     Extra-ovarian endometrioid adenocarcinoma 2020    GERD (gastroesophageal reflux disease)     History of chronic cough     clear productive    Hyperlipidemia     Hypertension     Macular degeneration     Ovarian cancer     PONV (postoperative nausea and vomiting)     Squamous cell carcinoma 1980's    precancer of cervix     Past Surgical History:   Procedure Laterality Date    AUGMENTATION OF BREAST      BRONCHOSCOPY WITH FLUOROSCOPY Right 05/11/2023    Procedure: BRONCHOSCOPY, WITH FLUOROSCOPY;  Surgeon: Neva Christian MD;  Location: [facility];  Service: Pulmonary;  Laterality: Right;    CATARACT EXTRACTION BILATERAL W/ ANTERIOR VITRECTOMY Bilateral     CHOLECYSTECTOMY      COLONOSCOPY      COLONOSCOPY N/A 04/20/2023    Procedure: COLONOSCOPY;  Surgeon: Sabino Stephenson MD;  Location: [facility];  Service: Endoscopy;  Laterality: N/A;    CORONARY STENT PLACEMENT  10/2023    x 3    ESOPHAGOGASTRODUODENOSCOPY N/A 06/20/2018    Procedure: EGD (ESOPHAGOGASTRODUODENOSCOPY);  Surgeon: Sabino Stephenson MD;  Location: [facility];  Service: Endoscopy;  Laterality: N/A;    ESOPHAGOGASTRODUODENOSCOPY N/A 03/31/2023    Procedure: EGD (ESOPHAGOGASTRODUODENOSCOPY);  Surgeon: Sabino Stephenson MD;  Location: [facility];  Service: Endoscopy;  Laterality: N/A;    HYSTERECTOMY  1976    partial    INJECTION OF ANESTHETIC AGENT AROUND MEDIAL BRANCH NERVES INNERVATING LUMBAR FACET JOINT Right 05/10/2023    Procedure: Block-nerve-Lateral-branch-lumbar;  Surgeon: Agusitn Robles MD;  Location: [facility] OR;   Service: Pain Management;  Laterality: Right;  L5 and s1,s2 LBB    INJECTION OF ANESTHETIC AGENT AROUND MEDIAL BRANCH NERVES INNERVATING LUMBAR FACET JOINT Right 05/30/2023    Procedure: Block-nerve-medial branch-lumbar;  Surgeon: Agustin Robles MD;  Location: [facility] OR;  Service: Pain Management;  Laterality: Right;  L5, s1 ,s2 LBB #2    INJECTION OF ANESTHETIC AGENT AROUND NERVE Right 10/31/2023    Procedure: INTERCOSTAL NERVE BLOCK;  Surgeon: Washington Shankar MD;  Location: [facility] OR;  Service: Cardiothoracic;  Laterality: Right;    INJECTION, SACROILIAC JOINT Right 01/26/2023    Procedure: INJECTION,SACROILIAC JOINT;  Surgeon: Agustin Robles MD;  Location: [facility] OR;  Service: Pain Management;  Laterality: Right;    INSERTION OF TUNNELED CENTRAL VENOUS CATHETER (CVC) WITH SUBCUTANEOUS PORT Right 10/19/2020    Procedure: INSERTION, PORT-A-CATH;  Surgeon: Misti Mcfarland MD;  Location: [facility] OR;  Service: General;  Laterality: Right;    INTRAMEDULLARY RODDING OF TROCHANTER OF FEMUR Right 11/28/2021    Procedure: INSERTION, INTRAMEDULLARY LUC, FEMUR, TROCHANTER/RIGHT TFN DR FAJARDO NOTIFIED REP;  Surgeon: Kp Fajardo MD;  Location: [facility] OR;  Service: Orthopedics;  Laterality: Right;  SKIP    ROBOT-ASSISTED LAPAROSCOPIC LYMPHADENECTOMY USING DA NELLA XI N/A 09/21/2020    Procedure: XI ROBOTIC LYMPHADENECTOMY-pelvic and kell-aortic;  Surgeon: Altagracia Ray MD;  Location: [facility] OR;  Service: OB/GYN;  Laterality: N/A;    ROBOT-ASSISTED LAPAROSCOPIC OMENTECTOMY USING DA NELLA XI N/A 09/21/2020    Procedure: XI ROBOTIC OMENTECTOMY;  Surgeon: Altagracia Ray MD;  Location: [facility] OR;  Service: OB/GYN;  Laterality: N/A;    ROBOT-ASSISTED LAPAROSCOPIC SALPINGO-OOPHORECTOMY USING DA NELLA XI Bilateral 09/21/2020    Procedure: XI ROBOTIC SALPINGO-OOPHORECTOMY;  Surgeon: Altagracia Ray MD;  Location: [facility] OR;  Service: OB/GYN;  Laterality: Bilateral;    THORACOSCOPIC BIOPSY OF PLEURA Right 10/31/2023    Procedure:  VATS, WITH PLEURA BIOPSY;  Surgeon: Washington Shankar MD;  Location: [facility] OR;  Service: Cardiothoracic;  Laterality: Right;  FROZEN SECTION   **KRISTEN-ASSISDT**    THORACOTOMY Right 10/31/2023    Procedure: THORACOTOMY;  Surgeon: Washington Shankar MD;  Location: [facility] OR;  Service: Cardiothoracic;  Laterality: Right;    UPPER GASTROINTESTINAL ENDOSCOPY       Family History       Problem Relation (Age of Onset)    Breast cancer Maternal Grandmother, Paternal Grandmother    Cancer Mother (57), Father (56)    Colon polyps Daughter    Melanoma Brother    Skin cancer Sister, Brother, Brother          Tobacco Use    Smoking status: Former     Current packs/day: 0.00     Average packs/day: 1 pack/day for 20.0 years (20.0 ttl pk-yrs)     Types: Cigarettes     Start date:      Quit date:      Years since quittin.9    Smokeless tobacco: Never    Tobacco comments:     quit 40 yrs ago   Substance and Sexual Activity    Alcohol use: Yes     Alcohol/week: 1.0 standard drink of alcohol     Types: 1 Glasses of wine per week     Comment: occasional    Drug use: No    Sexual activity: Not Currently     Partners: Male       Review of Systems   Constitutional:  Negative for fever.   Respiratory:  Negative for shortness of breath.    Cardiovascular:  Negative for chest pain and leg swelling.   Gastrointestinal:  Negative for abdominal pain and blood in stool.   Genitourinary:  Negative for hematuria.   Skin:  Negative for rash.     Objective:     Vital Signs (Most Recent):  Temp: 97.8 °F (36.6 °C) (23)  Pulse: 87 (23)  Resp: 18 (23)  BP: 137/73 (23)  SpO2: 95 % (23) Vital Signs (24h Range):  Temp:  [97.8 °F (36.6 °C)-98.4 °F (36.9 °C)] 97.8 °F (36.6 °C)  Pulse:  [] 87  Resp:  [18] 18  SpO2:  [93 %-97 %] 95 %  BP: ()/(60-73) 137/73     Weight: 53.3 kg (117 lb 9.6 oz)  Body mass index is 20.83 kg/m².  Body surface area is 1.54 meters  squared.      Intake/Output Summary (Last 24 hours) at 12/5/2023 0659  Last data filed at 12/4/2023 1806  Gross per 24 hour   Intake 100 ml   Output --   Net 100 ml        Physical Exam  Constitutional:       Appearance: Normal appearance.   HENT:      Head: Normocephalic and atraumatic.   Eyes:      General: No scleral icterus.     Conjunctiva/sclera: Conjunctivae normal.   Cardiovascular:      Rate and Rhythm: Normal rate.   Pulmonary:      Effort: Pulmonary effort is normal.      Breath sounds: Normal breath sounds.   Abdominal:      General: Abdomen is flat.   Neurological:      General: No focal deficit present.      Mental Status: She is alert and oriented to person, place, and time.   Psychiatric:         Mood and Affect: Mood normal.         Behavior: Behavior normal.         Thought Content: Thought content normal.         Judgment: Judgment normal.          Significant Labs:   CBC:   Recent Labs   Lab 12/03/23 1856 12/04/23  0303 12/05/23  0527   WBC 7.14 6.02 6.28   HGB 10.7* 8.8* 9.1*   HCT 32.7* 28.3* 29.3*    204 261    and CMP:   Recent Labs   Lab 12/03/23 1856 12/04/23  0303    138   K 3.5 4.3    105   CO2 22* 26   * 99   BUN 24* 27*   CREATININE 1.0 0.9   CALCIUM 8.8 8.0*   PROT 6.2 5.1*   ALBUMIN 3.4* 2.8*   BILITOT 0.5 0.5   ALKPHOS 45* 36*   AST 13 10   ALT 12 9*   ANIONGAP 11 7*       Diagnostic Results:  None  Assessment/Plan:     * Pneumonia  Pneumonia:  Patient appears to have a multifocal pneumonia and is on azithro/ceftriaxone at this time.  She is currently stable and will leave further decision on abx therapy to primary team.      Lung cancer:  This appears to not be playing a role at this time but will certainly need to re-image her once she has completed therapy for this pneumonia.  She has not had any chemo or radiation therapy at this time but this is being considered for the near future(chemo).     Will follow her up in clinic upon discharge.  Will sign  off but please call if there are any other unanswered questions or issues.          Thank you for your consult. I will follow-up with patient. Please contact us if you have any additional questions.    Virgil Lloyd MD  Hematology/Oncology  [facility]

## 2023-12-05 NOTE — ASSESSMENT & PLAN NOTE
CTA chest negative for PE   Development of moderate size right pleural effusion with diffuse alveolar opacities in the right upper lobe and patchy groundglass opacities in the left upper lobe and left lung base suggestive of multifocal pneumonia  Improvement of the dense alveolar consolidation in the right lower lobe    Blood cultures no growth.  Respiratory infection panel negative.  MRSA screen negative.  Stop vancomycin.  Continue ceftriaxone.  Start azithromycin.  Obtain sputum culture.  Heme-Onc and pulmonology consulted.    Currently on 2 L nasal cannula.  Wean as tolerated.  Thoracentesis completed on December 5th with 650 cc fluid removed

## 2023-12-05 NOTE — PLAN OF CARE
12/05/23 0820   Patient Assessment/Suction   Level of Consciousness (AVPU) alert   Respiratory Effort Unlabored   PRE-TX-O2   Device (Oxygen Therapy) nasal cannula   $ Is the patient on Low Flow Oxygen? Yes   Flow (L/min) 2   SpO2 96 %   Pulse Oximetry Type Intermittent   $ Pulse Oximetry - Multiple Charge Pulse Oximetry - Multiple   Pulse 99   Resp 18   Respiratory Evaluation   $ Care Plan Tech Time 15 min

## 2023-12-05 NOTE — PROGRESS NOTES
[facility] Medicine  Progress Note    Patient Name: Alexa Otero  MRN: [id number]  Patient Class: IP- Inpatient   Admission Date: 12/3/2023  Length of Stay: 1 days  Attending Physician: Garrett Jones MD  Primary Care Provider: Idalia Greco MD        Subjective:     Principal Problem:Pneumonia        HPI:  Patient is an 83-year-old female with history of CAD status post PCI, VFib arrest, type 2 diabetes, hyperlipidemia, hypertension, right lobe adenocarcinoma status post lobectomy who presents to the emergency room for 2 days of fatigue, generalized weakness, vomiting.  Patient reports sudden onset of symptoms.  Denies any sick contacts.  Reports diarrhea although it is resolved today.  Patient reports not started chemotherapy yet.  Denies chest pain, shortness for breath, abdominal pain.  Temp on admission was 100.5, oxygen saturation was 89% patient was placed on 1 L nasal cannula with improvement.  Chest x-ray showed diffuse infiltrative changes in the right lung and right pleural effusion that is unchanged from prior chest x-ray.  Troponin 16.6.  Patient being admitted for sepsis of unknown origin.  Urinalysis pending.  Given cefepime and vancomycin in the emergency room.        Overview/Hospital Course:  No notes on file    Interval History:  Patient is on 1 L nasal cannula.  Thoracentesis completed this morning with 650 cc of fluid removed.    Review of Systems   Constitutional:  Positive for chills and fatigue.   HENT:  Negative for congestion and ear pain.    Respiratory:  Negative for chest tightness and shortness of breath.    Cardiovascular:  Negative for chest pain and palpitations.   Gastrointestinal:  Positive for diarrhea and nausea. Negative for abdominal distention and abdominal pain.   Genitourinary:  Negative for difficulty urinating and dysuria.   Musculoskeletal:  Negative for arthralgias and back pain.   Neurological:  Negative for dizziness.   Psychiatric/Behavioral:   Negative for agitation and behavioral problems.      Objective:     Vital Signs (Most Recent):  Temp: 97.7 °F (36.5 °C) (12/05/23 1114)  Pulse: 85 (12/05/23 1114)  Resp: 18 (12/05/23 1114)  BP: 114/61 (12/05/23 1114)  SpO2: 95 % (12/05/23 1114) Vital Signs (24h Range):  Temp:  [97.3 °F (36.3 °C)-98.4 °F (36.9 °C)] 97.7 °F (36.5 °C)  Pulse:  [84-99] 85  Resp:  [18] 18  SpO2:  [93 %-96 %] 95 %  BP: (105-138)/(61-73) 114/61     Weight: 53.3 kg (117 lb 9.6 oz)  Body mass index is 20.83 kg/m².    Intake/Output Summary (Last 24 hours) at 12/5/2023 1135  Last data filed at 12/4/2023 1806  Gross per 24 hour   Intake 100 ml   Output --   Net 100 ml           Physical Exam  Vitals reviewed.   Constitutional:       Appearance: She is ill-appearing.   HENT:      Head: Normocephalic and atraumatic.      Right Ear: External ear normal.      Left Ear: External ear normal.      Mouth/Throat:      Mouth: Mucous membranes are dry.   Cardiovascular:      Rate and Rhythm: Regular rhythm. Tachycardia present.   Pulmonary:      Breath sounds: Rhonchi present.   Chest:      Chest wall: No tenderness.   Abdominal:      General: There is no distension.      Tenderness: There is no abdominal tenderness.   Musculoskeletal:         General: Normal range of motion.   Skin:     General: Skin is warm and dry.   Neurological:      General: No focal deficit present.      Mental Status: She is alert and oriented to person, place, and time.             Significant Labs: All pertinent labs within the past 24 hours have been reviewed.  Recent Lab Results  (Last 5 results in the past 24 hours)        12/05/23  1115   12/05/23  0945   12/05/23  0738   12/05/23  0527   12/04/23 2021        Albumin       2.9         ALP       47         ALT       10         Anion Gap       7         AST       9         Baso #       0.02         Basophil %       0.3         BILIRUBIN TOTAL       0.4  Comment: For infants and newborns, interpretation of results should be  based  on gestational age, weight and in agreement with clinical  observations.    Premature Infant recommended reference ranges:  Up to 24 hours.............<8.0 mg/dL  Up to 48 hours............<12.0 mg/dL  3-5 days..................<15.0 mg/dL  6-29 days.................<15.0 mg/dL           Body Fluid Source, Glucose   Thoracentesis             Body Fluid Source, LDH   Thoracentesis             Body Fluid Source, Total Protein   Thoracentesis             Body Fluid, Protein   <3.0  Comment: The reference range and other method performance specifications  have not been established for this test in fluids other than   plasma, serum, CSF, and urine. The test result should be integrated   into the clinical context for proper interpretation.               BUN       26         Calcium       8.4         Chloride       103         CO2       26         Creatinine       1.1         Differential Method       Automated         eGFR       50.2         Eos #       0.1         Eosinophil %       2.2         Glucose       101         Glucose, Fluid   107  Comment: The reference range and other method performance specifications  have not been established for this test in fluids other than   plasma, serum, CSF, and urine. The test result should be integrated   into the clinical context for proper interpretation.               Gran # (ANC)       4.7         Gran %       75.5         Hematocrit       29.3         Hemoglobin       9.1         Immature Grans (Abs)       0.08  Comment: Mild elevation in immature granulocytes is non specific and   can be seen in a variety of conditions including stress response,   acute inflammation, trauma and pregnancy. Correlation with other   laboratory and clinical findings is essential.           Immature Granulocytes       1.3         LD       147  Comment: Results are increased in hemolyzed samples.         LD, Fluid   107  Comment: The reference range and other method performance  specifications  have not been established for this test in fluids other than   plasma, serum, CSF, and urine. The test result should be integrated   into the clinical context for proper interpretation.               Lymph #       0.7         Lymph %       10.8         Magnesium        2.6         MCH       28.6         MCHC       31.1         MCV       92         Mono #       0.6         Mono %       9.9         MPV       10.1         nRBC       0         Platelet Count       261         POC Glucose 145     110     159       Potassium       4.2         PROTEIN TOTAL       5.7         RBC       3.18         RDW       15.1         Sodium       136         WBC       6.28                                Significant Imaging: I have reviewed all pertinent imaging results/findings within the past 24 hours.    Assessment/Plan:      * Pneumonia  CTA chest negative for PE   Development of moderate size right pleural effusion with diffuse alveolar opacities in the right upper lobe and patchy groundglass opacities in the left upper lobe and left lung base suggestive of multifocal pneumonia  Improvement of the dense alveolar consolidation in the right lower lobe    Blood cultures no growth.  Respiratory infection panel negative.  MRSA screen negative.  Stop vancomycin.  Continue ceftriaxone.  Start azithromycin.  Obtain sputum culture.  Heme-Onc and pulmonology consulted.    Currently on 2 L nasal cannula.  Wean as tolerated.  Thoracentesis completed on December 5th with 650 cc fluid removed    Malignant neoplasm of lower lobe of right lung  Follows with Dr. Lloyd outpatient, status post right lower lobe lobectomy.  Has not started chemotherapy yet.  Heme-Onc consulted      Pleural effusion  Pulmonology consulted.  Thoracentesis completed.  650 cc of fluid removed on December 5th.  Follow up studies.      Elevated troponin  Likely secondary to demand, follow up repeat troponin      Cardiopulmonary arrest  History of VFib  arrest on 10/13/2023, currently on amiodarone    On dual antiplatelet therapy for CAD.    Controlled type 2 diabetes mellitus with stage 3 chronic kidney disease, without long-term current use of insulin  Creatine stable for now. BMP reviewed- noted Estimated Creatinine Clearance: 35.9 mL/min (based on SCr of 1 mg/dL). according to latest data. Based on current GFR, CKD stage is stage 3 - GFR 30-59.  Monitor UOP and serial BMP and adjust therapy as needed. Renally dose meds. Avoid nephrotoxic medications and procedures.    Patient given fluid bolus in the emergency room.  We will start on IV fluids gentle.  Continue with insulin sliding scale.  Home oral medications held.      VTE Risk Mitigation (From admission, onward)           Ordered     enoxaparin injection 40 mg  Daily         12/03/23 2138     IP VTE HIGH RISK PATIENT  Once         12/03/23 2137     Place sequential compression device  Until discontinued         12/03/23 2137                    Discharge Planning   ALYCIA: 12/7/2023     Code Status: Full Code   Is the patient medically ready for discharge?:     Reason for patient still in hospital (select all that apply): Treatment  Discharge Plan A: Home with family                  Garrett Jones MD  Department of Hospital Medicine   [facility]

## 2023-12-05 NOTE — HPI
" is a 82yo female whom I met last week in consultation for a new diagnosis of Stage II lung cancer.  She underwent RLL lobectomy on 10/31/2023 and was seen by me for recommendations on adjuvant therapy.   She has not undergone any chemo or radiation therapy at this time.  She presented to the ED with a cc of profound fatigue and weakness which occurred upon waking on Sunday morning.  She underwent CTA of the chest which did not show a PE but did show the following:    "Development of moderate size right pleural effusion with diffuse alveolar opacities in the right upper lobe and patchy groundglass opacities in the left upper lobe and left lung base suggestive of multifocal pneumonia "    Blood and respiratory cultures are negative at this time and her high sensitivity troponin was slightly elevated at 16 on admission.  Note is also made of her elevated BNP of over 600.  (Echo 10/13/2023 showed normal EF of 55-60%)    She had a temp of 100.5 at admit but has been afebrile since this time and her BP has been stable.    "

## 2023-12-05 NOTE — ASSESSMENT & PLAN NOTE
Pulmonology consulted.  Thoracentesis completed.  650 cc of fluid removed on December 5th.  Follow up studies.

## 2023-12-06 VITALS
DIASTOLIC BLOOD PRESSURE: 67 MMHG | HEIGHT: 63 IN | WEIGHT: 117.63 LBS | HEART RATE: 83 BPM | BODY MASS INDEX: 20.84 KG/M2 | RESPIRATION RATE: 18 BRPM | SYSTOLIC BLOOD PRESSURE: 142 MMHG | OXYGEN SATURATION: 98 % | TEMPERATURE: 97 F

## 2023-12-06 LAB
ALBUMIN SERPL BCP-MCNC: 2.7 G/DL (ref 3.5–5.2)
ALP SERPL-CCNC: 39 U/L (ref 55–135)
ALT SERPL W/O P-5'-P-CCNC: 11 U/L (ref 10–44)
ANION GAP SERPL CALC-SCNC: 5 MMOL/L (ref 8–16)
AST SERPL-CCNC: 9 U/L (ref 10–40)
BASOPHILS # BLD AUTO: 0.02 K/UL (ref 0–0.2)
BASOPHILS NFR BLD: 0.4 % (ref 0–1.9)
BILIRUB SERPL-MCNC: 0.4 MG/DL (ref 0.1–1)
BUN SERPL-MCNC: 19 MG/DL (ref 8–23)
CALCIUM SERPL-MCNC: 8.4 MG/DL (ref 8.7–10.5)
CHLORIDE SERPL-SCNC: 103 MMOL/L (ref 95–110)
CO2 SERPL-SCNC: 29 MMOL/L (ref 23–29)
CREAT SERPL-MCNC: 0.9 MG/DL (ref 0.5–1.4)
DIFFERENTIAL METHOD: ABNORMAL
EOSINOPHIL # BLD AUTO: 0.2 K/UL (ref 0–0.5)
EOSINOPHIL NFR BLD: 3.4 % (ref 0–8)
ERYTHROCYTE [DISTWIDTH] IN BLOOD BY AUTOMATED COUNT: 14.8 % (ref 11.5–14.5)
EST. GFR  (NO RACE VARIABLE): >60 ML/MIN/1.73 M^2
GLUCOSE SERPL-MCNC: 117 MG/DL (ref 70–110)
GLUCOSE SERPL-MCNC: 129 MG/DL (ref 70–110)
GLUCOSE SERPL-MCNC: 167 MG/DL (ref 70–110)
HCT VFR BLD AUTO: 29.2 % (ref 37–48.5)
HGB BLD-MCNC: 9.1 G/DL (ref 12–16)
IMM GRANULOCYTES # BLD AUTO: 0.03 K/UL (ref 0–0.04)
IMM GRANULOCYTES NFR BLD AUTO: 0.5 % (ref 0–0.5)
LYMPHOCYTES # BLD AUTO: 0.6 K/UL (ref 1–4.8)
LYMPHOCYTES NFR BLD: 11.2 % (ref 18–48)
MAGNESIUM SERPL-MCNC: 2.1 MG/DL (ref 1.6–2.6)
MCH RBC QN AUTO: 28.6 PG (ref 27–31)
MCHC RBC AUTO-ENTMCNC: 31.2 G/DL (ref 32–36)
MCV RBC AUTO: 92 FL (ref 82–98)
MONOCYTES # BLD AUTO: 0.6 K/UL (ref 0.3–1)
MONOCYTES NFR BLD: 10.6 % (ref 4–15)
NEUTROPHILS # BLD AUTO: 4.2 K/UL (ref 1.8–7.7)
NEUTROPHILS NFR BLD: 73.9 % (ref 38–73)
NRBC BLD-RTO: 0 /100 WBC
PLATELET # BLD AUTO: 268 K/UL (ref 150–450)
PMV BLD AUTO: 9.6 FL (ref 9.2–12.9)
POTASSIUM SERPL-SCNC: 3.9 MMOL/L (ref 3.5–5.1)
PROT SERPL-MCNC: 5.5 G/DL (ref 6–8.4)
RBC # BLD AUTO: 3.18 M/UL (ref 4–5.4)
SODIUM SERPL-SCNC: 137 MMOL/L (ref 136–145)
WBC # BLD AUTO: 5.65 K/UL (ref 3.9–12.7)

## 2023-12-06 PROCEDURE — 99232 PR SUBSEQUENT HOSPITAL CARE,LEVL II: ICD-10-PCS | Mod: ,,, | Performed by: INTERNAL MEDICINE

## 2023-12-06 PROCEDURE — 25000003 PHARM REV CODE 250: Performed by: STUDENT IN AN ORGANIZED HEALTH CARE EDUCATION/TRAINING PROGRAM

## 2023-12-06 PROCEDURE — 85025 COMPLETE CBC W/AUTO DIFF WBC: CPT | Performed by: STUDENT IN AN ORGANIZED HEALTH CARE EDUCATION/TRAINING PROGRAM

## 2023-12-06 PROCEDURE — 87205 SMEAR GRAM STAIN: CPT | Performed by: STUDENT IN AN ORGANIZED HEALTH CARE EDUCATION/TRAINING PROGRAM

## 2023-12-06 PROCEDURE — 80053 COMPREHEN METABOLIC PANEL: CPT | Performed by: STUDENT IN AN ORGANIZED HEALTH CARE EDUCATION/TRAINING PROGRAM

## 2023-12-06 PROCEDURE — 94761 N-INVAS EAR/PLS OXIMETRY MLT: CPT

## 2023-12-06 PROCEDURE — 99900035 HC TECH TIME PER 15 MIN (STAT)

## 2023-12-06 PROCEDURE — 99232 SBSQ HOSP IP/OBS MODERATE 35: CPT | Mod: ,,, | Performed by: INTERNAL MEDICINE

## 2023-12-06 PROCEDURE — 83735 ASSAY OF MAGNESIUM: CPT | Performed by: STUDENT IN AN ORGANIZED HEALTH CARE EDUCATION/TRAINING PROGRAM

## 2023-12-06 PROCEDURE — 27000221 HC OXYGEN, UP TO 24 HOURS

## 2023-12-06 PROCEDURE — 87070 CULTURE OTHR SPECIMN AEROBIC: CPT | Performed by: STUDENT IN AN ORGANIZED HEALTH CARE EDUCATION/TRAINING PROGRAM

## 2023-12-06 PROCEDURE — 36415 COLL VENOUS BLD VENIPUNCTURE: CPT | Performed by: STUDENT IN AN ORGANIZED HEALTH CARE EDUCATION/TRAINING PROGRAM

## 2023-12-06 RX ORDER — ALBUTEROL SULFATE 90 UG/1
2 AEROSOL, METERED RESPIRATORY (INHALATION) EVERY 4 HOURS PRN
Qty: 6.7 G | Refills: 1 | Status: SHIPPED | OUTPATIENT
Start: 2023-12-06 | End: 2023-12-28

## 2023-12-06 RX ORDER — AZITHROMYCIN 500 MG/1
500 TABLET, FILM COATED ORAL DAILY
Qty: 1 TABLET | Refills: 0 | Status: SHIPPED | OUTPATIENT
Start: 2023-12-06 | End: 2023-12-20

## 2023-12-06 RX ORDER — CEFDINIR 300 MG/1
300 CAPSULE ORAL 2 TIMES DAILY
Qty: 10 CAPSULE | Refills: 0 | Status: ON HOLD | OUTPATIENT
Start: 2023-12-06 | End: 2023-12-13 | Stop reason: HOSPADM

## 2023-12-06 RX ADMIN — CLOPIDOGREL BISULFATE 75 MG: 75 TABLET, FILM COATED ORAL at 08:12

## 2023-12-06 RX ADMIN — ASPIRIN 81 MG: 81 TABLET, COATED ORAL at 08:12

## 2023-12-06 RX ADMIN — METOPROLOL SUCCINATE 25 MG: 25 TABLET, EXTENDED RELEASE ORAL at 08:12

## 2023-12-06 RX ADMIN — FAMOTIDINE 20 MG: 20 TABLET ORAL at 08:12

## 2023-12-06 RX ADMIN — GABAPENTIN 100 MG: 100 CAPSULE ORAL at 08:12

## 2023-12-06 RX ADMIN — AMIODARONE HYDROCHLORIDE 200 MG: 200 TABLET ORAL at 08:12

## 2023-12-06 RX ADMIN — MUPIROCIN 1 G: 20 OINTMENT TOPICAL at 08:12

## 2023-12-06 NOTE — CARE UPDATE
12/06/23 0732   PRE-TX-O2   Device (Oxygen Therapy) nasal cannula   $ Is the patient on Low Flow Oxygen? Yes   Flow (L/min) 2   SpO2 95 %   Pulse Oximetry Type Intermittent   $ Pulse Oximetry - Multiple Charge Pulse Oximetry - Multiple   Pulse 85   Resp 18   Respiratory Evaluation   $ Care Plan Tech Time 15 min

## 2023-12-06 NOTE — PROGRESS NOTES
Pulmonary/Critical Care Progress Note      PATIENT NAME: Alexa Otero  MRN: [id number]  TODAY'S DATE: 2023  6:27 PM  ADMIT DATE: 12/3/2023  AGE: 83 y.o. 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  This was resected by Dr. Shankar and the patient was sent to follow up with Dr. Cassidy for chemotherapy considerations.  On Saturday, the patient began feeling very poorly; she was very tired very weak she had nausea and vomiting and was vomiting clear liquid.  She has been anorectic since her surgery.  On  she was too weak to get out of her chair and her  called an ambulance.  She was found to be hypoxemic and to have a significant pneumonia through the right lung with a moderate right-sided pleural effusion.  There is also some infiltrate on the left as well.  When I walked into her room, her oxygen was off and her sats were 84%.     the patient is very weak.  She is coughing infrequently and not coughing up any mucus.  She states that she continued to cough up the same mucus she had been doing prior to her surgery, after her surgery but it has gradually lightened and 4 days ago it stopped.  Her thoracentesis revealed clear yellow fluid.  The drainage was stopped at 650 cc as she began developing pleurisy.  The postprocedure chest x-ray shows good elevation of the right hemidiaphragm and some shifting of the mediastinum and persistence of a lateral lower thorax alveolar infiltrate.     the patient is now on room air.  Her chest x-ray shows that her infiltrate is less dense.  Her fluid is a parapneumonic infiltrate.  No malignant cells were seen.    REVIEW OF SYSTEMS  GENERAL: Feeling tired  EYES: Vision is good.  ENT: No sinusitis or pharyngitis.    HEART: No chest pain or palpitations.  LUNGS:  She is coughing, she has had 1 episode of clear mucus expectorated..  GI:  She remains very anorectic.   : No dysuria, hesitancy, or nocturia.  SKIN: No lesions or rashes.  MUSCULOSKELETAL: No joint pain or myalgias.  NEURO: No headaches or neuropathy.  LYMPH: No edema or adenopathy.  PSYCH: No anxiety or depression.  ENDO: No weight change.    No change in the patient's Past Medical History, Past Surgical History, Social History or Family History since admission.    VITAL SIGNS (MOST RECENT)  Temp: 98.3 °F (36.8 °C) (12/06/23 0732)  Pulse: 84 (Simultaneous filing. User may not have seen previous data.) (12/06/23 0732)  Resp: 18 (Simultaneous filing. User may not have seen previous data.) (12/06/23 0732)  BP: 138/68 (12/06/23 0732)  SpO2: (!) 94 % (12/06/23 0828)    INTAKE AND OUTPUT (LAST 24 HOURS):  Intake/Output Summary (Last 24 hours) at 12/6/2023 1044  Last data filed at 12/6/2023 1006  Gross per 24 hour   Intake 340 ml   Output 300 ml   Net 40 ml       WEIGHT  Wt Readings from Last 1 Encounters:   12/03/23 53.3 kg (117 lb 9.6 oz)       PHYSICAL EXAM  GENERAL: Older patient in no distress, looking better  HEENT: Pupils equal and reactive. Extraocular movements intact. Nose intact. Pharynx moist.  NECK: Supple.   HEART: Regular rate and rhythm. No murmur or gallop auscultated.  LUNGS:  There are bibasilar crackles, right greater than left.  Lung excursion symmetrical. No change in fremitus.   ABDOMEN: Bowel sounds present. Non-tender, no masses palpated.  : Normal anatomy.  EXTREMITIES: Normal muscle tone and joint movement, no cyanosis or clubbing.   LYMPHATICS: No adenopathy palpated, no edema.  SKIN: Dry, intact, no lesions.   NEURO: Cranial nerves II-XII intact. Motor strength 5/5 bilaterally, upper and lower extremities.  PSYCH: Appropriate affect      CBC LAST (LAST 24 HOURS)  Recent Labs   Lab 12/06/23  0435   WBC 5.65   RBC 3.18*   HGB 9.1*   HCT 29.2*    MCV 92   MCH 28.6   MCHC 31.2*   RDW 14.8*      MPV 9.6   GRAN 73.9*  4.2   LYMPH 11.2*  0.6*   MONO 10.6  0.6   BASO 0.02   NRBC 0       CHEMISTRY LAST (LAST 24 HOURS)  Recent Labs   Lab 12/06/23  0435      K 3.9      CO2 29   ANIONGAP 5*   BUN 19   CREATININE 0.9   *   CALCIUM 8.4*   MG 2.1   ALBUMIN 2.7*   PROT 5.5*   ALKPHOS 39*   ALT 11   AST 9*   BILITOT 0.4           CARDIAC PROFILE (LAST 24 HOURS)  Recent Labs   Lab 12/03/23  1856 12/04/23  0303 12/05/23 0527   *  --   --    LDH  --   --  147   TROPONINIHS 16.6* 14.4  --        LAST 7 DAYS MICROBIOLOGY   Microbiology Results (last 7 days)       Procedure Component Value Units Date/Time    Culture, Respiratory with Gram Stain [[id number]] Collected: 12/06/23 0921    Order Status: Sent Specimen: Respiratory from Sputum Updated: 12/06/23 0927    Culture, Respiratory with Gram Stain [[id number]] Collected: 12/06/23 0921    Order Status: Sent Specimen: Respiratory from Sputum Updated: 12/06/23 0921    Blood culture [[id number]] Collected: 12/03/23 2108    Order Status: Completed Specimen: Blood from Antecubital, Left Updated: 12/05/23 2232     Blood Culture, Routine No Growth to date      No Growth to date      No Growth to date    Blood culture [[id number]] Collected: 12/03/23 2123    Order Status: Completed Specimen: Blood Updated: 12/05/23 2232     Blood Culture, Routine No Growth to date      No Growth to date      No Growth to date    Culture, Body Fluid (Aerobic) w/ GS [[id number]] Collected: 12/05/23 0945    Order Status: Completed Specimen: Pleural Fluid Updated: 12/05/23 1536     Gram Stain Result Moderate WBC's      No organisms seen    Narrative:      Lung, Right, Pleural Fluid.    AFB culture (includes stain) [[id number]] Collected: 12/05/23 0945    Order Status: Sent Specimen: Body Fluid from Lung, Right Updated: 12/05/23 1005    Fungus culture [[id number]] Collected: 12/05/23 0945    Order Status: Sent  Specimen: Body Fluid from Lung, Right Updated: 12/05/23 1005    MRSA Screen by PCR [[id number]] Collected: 12/04/23 1051    Order Status: Completed Specimen: Nasopharyngeal Swab from Nasal Updated: 12/04/23 1312     MRSA SCREEN BY PCR Negative    Respiratory Infection Panel (PCR), Nasopharyngeal [[id number]] Collected: 12/04/23 0758    Order Status: Completed Specimen: Nasopharyngeal Swab Updated: 12/04/23 0948     Respiratory Infection Panel Source NP swab     Adenovirus Not Detected     Coronavirus 229E, Common Cold Virus Not Detected     Coronavirus HKU1, Common Cold Virus Not Detected     Coronavirus NL63, Common Cold Virus Not Detected     Coronavirus OC43, Common Cold Virus Not Detected     Comment: Coronavirus strains 229E, HKU1, NL63, and OC43 can cause the common   cold   and are not associated with the respiratory disease outbreak caused   by  the COVID-19 (SARS-CoV-2 novel Coronavirus) strain.           SARS-CoV2 (COVID-19) Qualitative PCR Not Detected     Human Metapneumovirus Not Detected     Human Rhinovirus/Enterovirus Not Detected     Influenza A (subtypes H1, H1-2009,H3) Not Detected     Influenza B Not Detected     Parainfluenza Virus 1 Not Detected     Parainfluenza Virus 2 Not Detected     Parainfluenza Virus 3 Not Detected     Parainfluenza Virus 4 Not Detected     Respiratory Syncytial Virus Not Detected     Bordetella Parapertussis (CA2045) Not Detected     Bordetella pertussis (ptxP) Not Detected     Chlamydia pneumoniae Not Detected     Mycoplasma pneumoniae Not Detected     Comment: Respiratory Infection Panel testing performed by Multiplex PCR.       Narrative:      Respiratory Infection Panel source->NP Swab            MOST RECENT IMAGING  X-Ray Chest AP Portable  Portable chest x-ray 10:14 AM is compared to prior study 12/5/2023    Clinical history is pneumonia    The cardiomediastinal silhouette is stable in size. There is a right subclavian vein Port-A-Cath with tip overlying the  superior vena cava.    There is stable alveolar consolidation in the right midlung with diffuse groundglass opacities in the right lung. There are small right pleural effusion.  The left lung is clear.  There are bilateral rim calcified breast implants.    IMPRESSION: Small right pleural effusion with stable airspace disease in the right lung suggestive of pneumonia    Electronically signed by:  Chichi Gann MD  12/06/2023 10:36 AM CST Workstation: [id number]      CURRENT VISIT EKG  Results for orders placed or performed during the hospital encounter of 12/03/23   EKG 12-lead    Narrative    Test Reason : R00.0,    Vent. Rate : 112 BPM     Atrial Rate : 112 BPM     P-R Int : 114 ms          QRS Dur : 058 ms      QT Int : 346 ms       P-R-T Axes : 031 015 053 degrees     QTc Int : 472 ms    Sinus tachycardia  Junctional ST depression, probably normal  Borderline Abnormal ECG  When compared with ECG of 03-NOV-2023 09:58,  Sinus rhythm has replaced Atrial fibrillation  Criteria for Anterior infarct are no longer Present  ST now depressed in Anterior leads  Nonspecific T wave abnormality, improved in Lateral leads    Referred By: [email or web address]   SELF           Confirmed By:        ECHOCARDIOGRAM RESULTS  Results for orders placed during the hospital encounter of 10/13/23    Echo    Interpretation Summary    Left Ventricle: The left ventricle is normal in size. Normal wall thickness. Normal wall motion. There is normal systolic function with a visually estimated ejection fraction of 55 - 60%. Grade I diastolic dysfunction.    Right Ventricle: Normal right ventricular cavity size. Wall thickness is normal. Right ventricle wall motion  is normal. Systolic function is normal.    Aortic Valve: There is moderate aortic valve sclerosis.    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 There is mild mitral annular calcification present. There is no stenosis. The mean pressure gradient across the mitral valve is  3 mmHg at a heart rate of  bpm. There is mild regurgitation.    Tricuspid Valve: There is mild regurgitation.  No pulmonary hypertension.    IVC/SVC: Normal venous pressure at 3 mmHg.      The patient is on room air         IMPRESSION AND PLAN  Multilobar pneumonia  - on Rocephin and azithromycin  - improving radiographically  Significant right pleural effusion  - parapneumonic effusion  Acute hypoxemic respiratory failure  - resolved  Right lower lobe resection with pathology showing a pneumonic pattern adenocarcinoma  Prior PET scan showed small nodules elsewhere in the lungs that were not hypermetabolic but were very small and still worrisome  - Dr. Cassidy is following  Anemia, acute and chronic illness  - stable  Prerenal azotemia  Hypomagnesemia  - treated  Moderate hypoalbuminemia/malnutrition  - patient has lost 7 lb since surgery    No objection to discharging the patient on a week of antibiotics orally.  She needs to follow up with me in the office in 2 weeks with a new chest x-ray.    Neva Christian MD  Date of Service: 12/06/2023  6:27 PM

## 2023-12-06 NOTE — PT/OT/SLP PROGRESS
Physical Therapy      Patient Name:  Alexa Otero   MRN:  [id number]    Patient not seen today secondary to Patient discharged prior to initiation of evaluation.

## 2023-12-06 NOTE — HOSPITAL COURSE
Patient is started on ceftriaxone and azithromycin for pneumonia. CTA chest with No CT evidence pulmonary emboli. Development of moderate size right pleural effusion with diffuse alveolar opacities in the right upper lobe and patchy groundglass opacities in the left upper lobe and left lung base suggestive of multifocal pneumonia.  Heme-Onc and pulmonology consulted.  Thoracentesis completed on December 5th with 650 cc of fluid removed.  Cultures were no growth.  Patient weaned off oxygen.  Patient discharged home on December 6 with cefdinir and azithromycin.

## 2023-12-06 NOTE — DISCHARGE SUMMARY
[facility] Medicine  Discharge Summary      Patient Name: Alexa Otero  MRN: [id number]  LISA: [id number]  Patient Class: IP- Inpatient  Admission Date: 12/3/2023  Hospital Length of Stay: 2 days  Discharge Date and Time:  12/06/2023 1:23 PM  Attending Physician: No att. providers found   Discharging Provider: Garrett Jones MD  Primary Care Provider: Idalia Greco MD    Primary Care Team: Networked reference to record PCT     HPI:   Patient is an 83-year-old female with history of CAD status post PCI, VFib arrest, type 2 diabetes, hyperlipidemia, hypertension, right lobe adenocarcinoma status post lobectomy who presents to the emergency room for 2 days of fatigue, generalized weakness, vomiting.  Patient reports sudden onset of symptoms.  Denies any sick contacts.  Reports diarrhea although it is resolved today.  Patient reports not started chemotherapy yet.  Denies chest pain, shortness for breath, abdominal pain.  Temp on admission was 100.5, oxygen saturation was 89% patient was placed on 1 L nasal cannula with improvement.  Chest x-ray showed diffuse infiltrative changes in the right lung and right pleural effusion that is unchanged from prior chest x-ray.  Troponin 16.6.  Patient being admitted for sepsis of unknown origin.  Urinalysis pending.  Given cefepime and vancomycin in the emergency room.        * No surgery found *      Hospital Course:   Patient is started on ceftriaxone and azithromycin for pneumonia. CTA chest with No CT evidence pulmonary emboli. Development of moderate size right pleural effusion with diffuse alveolar opacities in the right upper lobe and patchy groundglass opacities in the left upper lobe and left lung base suggestive of multifocal pneumonia.  Heme-Onc and pulmonology consulted.  Thoracentesis completed on December 5th with 650 cc of fluid removed.  Cultures were no growth.  Patient weaned off oxygen.  Patient discharged home on December 6 with cefdinir  and azithromycin.     Goals of Care Treatment Preferences:  Code Status: Full Code    Living Will: Yes              Consults:   Consults (From admission, onward)          Status Ordering Provider     Inpatient consult to Pulmonology  Once        Provider:  Sudha Nuñez MD    Completed CANDACE HART     Inpatient consult to Hematology Oncology  Once        Provider:  TONI Garcia MD    Acknowledged CANDACE HART            No new Assessment & Plan notes have been filed under this hospital service since the last note was generated.  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    Significant Diagnostic Studies: N/A    Pending Diagnostic Studies:       Procedure Component Value Units Date/Time    Cytology Specimen- Pulmonary Medical Cytology [[id number]] Collected: 12/05/23 1033    Order Status: Sent Lab Status: No result     Specimen: Other specimen location (comment)     Cytology Specimen- Pulmonary Medical Cytology [[id number]] Collected: 12/05/23 1030    Order Status: Sent Lab Status: No result     Specimen: Other specimen location (comment)            Medications:  Reconciled Home Medications:      Medication List         START taking these medications      albuterol 90 mcg/actuation inhaler  Commonly known as: VENTOLIN HFA  Inhale 2 puffs into the lungs every 4 (four) hours as needed for Wheezing or Shortness of Breath. Rescue     azithromycin 500 MG tablet  Commonly known as: ZITHROMAX  Take 1 tablet (500 mg total) by mouth once daily. for 1 day     cefdinir 300 MG capsule  Commonly known as: OMNICEF  Take 1 capsule (300 mg total) by mouth 2 (two) times daily. for 5 days            CHANGE how you take these medications      metFORMIN 500 MG tablet  Commonly known as: GLUCOPHAGE  TAKE 1 TABLET(500 MG) BY MOUTH TWICE DAILY WITH MEALS  What changed: when to take this            CONTINUE taking these medications      amiodarone 200 MG Tab  Commonly known as: PACERONE  Take 1 tablet (200 mg total) by mouth 2 (two) times daily.     aspirin 81 MG EC tablet  Commonly known as: ECOTRIN  Take 81 mg by mouth once daily.     clopidogreL 75 mg tablet  Commonly known as: PLAVIX  Take 75 mg by mouth once daily.     famotidine 20 MG tablet  Commonly known as: PEPCID  Take 1 tablet (20 mg total) by mouth once daily. for 30 doses     gabapentin 100 MG capsule  Commonly known as: NEURONTIN  TAKE 1 CAPSULE(100 MG) BY MOUTH TWICE DAILY     ICAPS AREDS ORAL  Take 1 capsule by mouth 2 (two) times a day.     magnesium oxide 400 mg (241.3 mg magnesium) tablet  Commonly known as: MAG-OX  Take 400 mg by mouth once daily.     melatonin 5 mg Chew  Take 2 tablets by mouth nightly as needed (insomnia).     metoprolol succinate 25 MG 24 hr tablet  Commonly known as: TOPROL-XL  Take 25 mg by mouth once daily.            STOP taking these medications      HYDROcodone-acetaminophen 5-325 mg per tablet  Commonly known as: NORCO              Indwelling Lines/Drains at time of discharge:   Lines/Drains/Airways       Central Venous Catheter Line  Duration             Port A Cath Single Lumen Subclavian Right -- days                    Time spent on the discharge of  patient: 40 minutes         Garrett Jones MD  Department of Hospital Medicine  [facility]

## 2023-12-06 NOTE — PLAN OF CARE
Discharge orders and chart reviewed with no further post-acute discharge needs identified at this time.  At this time, patient is cleared for discharge from Case Management standpoint.        12/06/23 1126   Final Note   Assessment Type Final Discharge Note   Anticipated Discharge Disposition Home   Hospital Resources/Appts/Education Provided Appointments scheduled and added to AVS   Post-Acute Status   Coverage [employer]   Discharge Delays None known at this time

## 2023-12-07 ENCOUNTER — TELEPHONE (OUTPATIENT)
Dept: PULMONOLOGY | Facility: CLINIC | Age: 83
End: 2023-12-07

## 2023-12-07 NOTE — TELEPHONE ENCOUNTER
----- Message from Joe Saldaña sent at 12/7/2023  2:23 PM CST -----  Regarding: rx  PT received rx in hospital and is unable to fill. Please give her a call [phone].  Thank you

## 2023-12-07 NOTE — TELEPHONE ENCOUNTER
Call patient who has not gotten her prescription filled for 2 days.  Asked her to try to get her Zithromax and take it as soon as possible.  Asked her to call me if she is not continuing to improve.  She has had no antibiotics today or yesterday.

## 2023-12-07 NOTE — TELEPHONE ENCOUNTER
Pt was dc'd on azithromycin but pharmacy is telling her her insurance will not pay for it.  Dr Lopes ordered for her to take ONE PILL ONCE DAILY FOR ONE DAY.  Pt wants to know what to do and if that is correct for her to only get one pill for one day.

## 2023-12-08 LAB
BACTERIA BLD CULT: NORMAL
BACTERIA BLD CULT: NORMAL
BACTERIA SPEC AEROBE CULT: NORMAL
GRAM STN SPEC: NORMAL

## 2023-12-09 LAB
BACTERIA FLD AEROBE CULT: NO GROWTH
GRAM STN SPEC: NORMAL
GRAM STN SPEC: NORMAL

## 2023-12-10 ENCOUNTER — HOSPITAL ENCOUNTER (INPATIENT)
Facility: HOSPITAL | Age: 83
LOS: 1 days | Discharge: HOME | DRG: 392 | End: 2023-12-13
Attending: EMERGENCY MEDICINE | Admitting: STUDENT IN AN ORGANIZED HEALTH CARE EDUCATION/TRAINING PROGRAM
Payer: MEDICARE

## 2023-12-10 DIAGNOSIS — R05.9 COUGH, UNSPECIFIED TYPE: Primary | ICD-10-CM

## 2023-12-10 DIAGNOSIS — R06.02 SOB (SHORTNESS OF BREATH): ICD-10-CM

## 2023-12-10 DIAGNOSIS — D50.0 IRON DEFICIENCY ANEMIA DUE TO CHRONIC BLOOD LOSS: ICD-10-CM

## 2023-12-10 DIAGNOSIS — J18.9 MULTIFOCAL PNEUMONIA: ICD-10-CM

## 2023-12-10 DIAGNOSIS — J18.9 PNEUMONIA OF BOTH LUNGS DUE TO INFECTIOUS ORGANISM, UNSPECIFIED PART OF LUNG: ICD-10-CM

## 2023-12-10 DIAGNOSIS — R10.13 EPIGASTRIC DISCOMFORT: ICD-10-CM

## 2023-12-10 LAB
ALBUMIN SERPL BCP-MCNC: 3 G/DL (ref 3.5–5.2)
ALP SERPL-CCNC: 40 U/L (ref 55–135)
ALT SERPL W/O P-5'-P-CCNC: 25 U/L (ref 10–44)
ANION GAP SERPL CALC-SCNC: 7 MMOL/L (ref 8–16)
AST SERPL-CCNC: 16 U/L (ref 10–40)
BASOPHILS # BLD AUTO: 0.03 K/UL (ref 0–0.2)
BASOPHILS NFR BLD: 0.4 % (ref 0–1.9)
BILIRUB SERPL-MCNC: 0.3 MG/DL (ref 0.1–1)
BNP SERPL-MCNC: 201 PG/ML (ref 0–99)
BUN SERPL-MCNC: 26 MG/DL (ref 8–23)
CALCIUM SERPL-MCNC: 8.7 MG/DL (ref 8.7–10.5)
CHLORIDE SERPL-SCNC: 104 MMOL/L (ref 95–110)
CO2 SERPL-SCNC: 27 MMOL/L (ref 23–29)
CREAT SERPL-MCNC: 1 MG/DL (ref 0.5–1.4)
DIFFERENTIAL METHOD BLD: ABNORMAL
EOSINOPHIL # BLD AUTO: 0.2 K/UL (ref 0–0.5)
EOSINOPHIL NFR BLD: 2.5 % (ref 0–8)
ERYTHROCYTE [DISTWIDTH] IN BLOOD BY AUTOMATED COUNT: 14.8 % (ref 11.5–14.5)
EST. GFR  (NO RACE VARIABLE): 56.2 ML/MIN/1.73 M^2
GLUCOSE SERPL-MCNC: 183 MG/DL (ref 70–110)
HCT VFR BLD AUTO: 31.4 % (ref 37–48.5)
HGB BLD-MCNC: 10 G/DL (ref 12–16)
IMM GRANULOCYTES # BLD AUTO: 0.05 K/UL (ref 0–0.04)
IMM GRANULOCYTES NFR BLD AUTO: 0.7 % (ref 0–0.5)
LACTATE SERPL-SCNC: 0.7 MMOL/L (ref 0.5–1.9)
LIPASE SERPL-CCNC: 34 U/L (ref 4–60)
LYMPHOCYTES # BLD AUTO: 0.7 K/UL (ref 1–4.8)
LYMPHOCYTES NFR BLD: 10.5 % (ref 18–48)
MAGNESIUM SERPL-MCNC: 1.7 MG/DL (ref 1.6–2.6)
MCH RBC QN AUTO: 28.5 PG (ref 27–31)
MCHC RBC AUTO-ENTMCNC: 31.8 G/DL (ref 32–36)
MCV RBC AUTO: 90 FL (ref 82–98)
MONOCYTES # BLD AUTO: 0.5 K/UL (ref 0.3–1)
MONOCYTES NFR BLD: 7.8 % (ref 4–15)
NEUTROPHILS # BLD AUTO: 5.4 K/UL (ref 1.8–7.7)
NEUTROPHILS NFR BLD: 78.1 % (ref 38–73)
NRBC BLD-RTO: 0 /100 WBC
PLATELET # BLD AUTO: 497 K/UL (ref 150–450)
PLATELET BLD QL SMEAR: ABNORMAL
PMV BLD AUTO: 9 FL (ref 9.2–12.9)
POTASSIUM SERPL-SCNC: 3.7 MMOL/L (ref 3.5–5.1)
PROCALCITONIN SERPL IA-MCNC: 0.36 NG/ML (ref 0–0.5)
PROT SERPL-MCNC: 6.2 G/DL (ref 6–8.4)
RBC # BLD AUTO: 3.51 M/UL (ref 4–5.4)
SODIUM SERPL-SCNC: 138 MMOL/L (ref 136–145)
TROPONIN I SERPL HS-MCNC: 9.7 PG/ML (ref 0–14.9)
WBC # BLD AUTO: 6.88 K/UL (ref 3.9–12.7)

## 2023-12-10 PROCEDURE — 86140 C-REACTIVE PROTEIN: CPT | Performed by: EMERGENCY MEDICINE

## 2023-12-10 PROCEDURE — 36415 COLL VENOUS BLD VENIPUNCTURE: CPT | Performed by: EMERGENCY MEDICINE

## 2023-12-10 PROCEDURE — 83880 ASSAY OF NATRIURETIC PEPTIDE: CPT | Performed by: EMERGENCY MEDICINE

## 2023-12-10 PROCEDURE — 25000003 PHARM REV CODE 250: Performed by: STUDENT IN AN ORGANIZED HEALTH CARE EDUCATION/TRAINING PROGRAM

## 2023-12-10 PROCEDURE — 25500020 PHARM REV CODE 255: Performed by: EMERGENCY MEDICINE

## 2023-12-10 PROCEDURE — 96365 THER/PROPH/DIAG IV INF INIT: CPT

## 2023-12-10 PROCEDURE — 94761 N-INVAS EAR/PLS OXIMETRY MLT: CPT

## 2023-12-10 PROCEDURE — G0378 HOSPITAL OBSERVATION PER HR: HCPCS

## 2023-12-10 PROCEDURE — 93010 ELECTROCARDIOGRAM REPORT: CPT | Mod: ,,, | Performed by: GENERAL PRACTICE

## 2023-12-10 PROCEDURE — 84484 ASSAY OF TROPONIN QUANT: CPT | Performed by: EMERGENCY MEDICINE

## 2023-12-10 PROCEDURE — 80053 COMPREHEN METABOLIC PANEL: CPT | Performed by: EMERGENCY MEDICINE

## 2023-12-10 PROCEDURE — A4216 STERILE WATER/SALINE, 10 ML: HCPCS | Performed by: STUDENT IN AN ORGANIZED HEALTH CARE EDUCATION/TRAINING PROGRAM

## 2023-12-10 PROCEDURE — 83735 ASSAY OF MAGNESIUM: CPT | Performed by: EMERGENCY MEDICINE

## 2023-12-10 PROCEDURE — 85025 COMPLETE CBC W/AUTO DIFF WBC: CPT | Performed by: EMERGENCY MEDICINE

## 2023-12-10 PROCEDURE — 87040 BLOOD CULTURE FOR BACTERIA: CPT | Mod: 59 | Performed by: EMERGENCY MEDICINE

## 2023-12-10 PROCEDURE — C9113 INJ PANTOPRAZOLE SODIUM, VIA: HCPCS | Performed by: STUDENT IN AN ORGANIZED HEALTH CARE EDUCATION/TRAINING PROGRAM

## 2023-12-10 PROCEDURE — 36415 COLL VENOUS BLD VENIPUNCTURE: CPT | Performed by: STUDENT IN AN ORGANIZED HEALTH CARE EDUCATION/TRAINING PROGRAM

## 2023-12-10 PROCEDURE — 96375 TX/PRO/DX INJ NEW DRUG ADDON: CPT

## 2023-12-10 PROCEDURE — 63600175 PHARM REV CODE 636 W HCPCS: Performed by: EMERGENCY MEDICINE

## 2023-12-10 PROCEDURE — 96372 THER/PROPH/DIAG INJ SC/IM: CPT | Performed by: STUDENT IN AN ORGANIZED HEALTH CARE EDUCATION/TRAINING PROGRAM

## 2023-12-10 PROCEDURE — 25000003 PHARM REV CODE 250: Performed by: EMERGENCY MEDICINE

## 2023-12-10 PROCEDURE — 63600175 PHARM REV CODE 636 W HCPCS: Performed by: STUDENT IN AN ORGANIZED HEALTH CARE EDUCATION/TRAINING PROGRAM

## 2023-12-10 PROCEDURE — 93005 ELECTROCARDIOGRAM TRACING: CPT | Performed by: GENERAL PRACTICE

## 2023-12-10 PROCEDURE — 99285 EMERGENCY DEPT VISIT HI MDM: CPT | Mod: 25

## 2023-12-10 PROCEDURE — 83605 ASSAY OF LACTIC ACID: CPT | Performed by: EMERGENCY MEDICINE

## 2023-12-10 PROCEDURE — 84145 PROCALCITONIN (PCT): CPT | Performed by: STUDENT IN AN ORGANIZED HEALTH CARE EDUCATION/TRAINING PROGRAM

## 2023-12-10 PROCEDURE — 83690 ASSAY OF LIPASE: CPT | Performed by: EMERGENCY MEDICINE

## 2023-12-10 PROCEDURE — 96367 TX/PROPH/DG ADDL SEQ IV INF: CPT

## 2023-12-10 RX ORDER — HYDROCODONE BITARTRATE AND ACETAMINOPHEN 5; 325 MG/1; MG/1
1 TABLET ORAL EVERY 6 HOURS PRN
Status: DISCONTINUED | OUTPATIENT
Start: 2023-12-10 | End: 2023-12-13 | Stop reason: HOSPADM

## 2023-12-10 RX ORDER — ONDANSETRON HYDROCHLORIDE 2 MG/ML
4 INJECTION, SOLUTION INTRAVENOUS EVERY 8 HOURS PRN
Status: DISCONTINUED | OUTPATIENT
Start: 2023-12-10 | End: 2023-12-13 | Stop reason: HOSPADM

## 2023-12-10 RX ORDER — SODIUM CHLORIDE 0.9 % (FLUSH) 0.9 %
10 SYRINGE (ML) INJECTION EVERY 12 HOURS
Status: DISCONTINUED | OUTPATIENT
Start: 2023-12-10 | End: 2023-12-13 | Stop reason: HOSPADM

## 2023-12-10 RX ORDER — ACETAMINOPHEN 325 MG/1
650 TABLET ORAL EVERY 8 HOURS PRN
Status: DISCONTINUED | OUTPATIENT
Start: 2023-12-10 | End: 2023-12-13 | Stop reason: HOSPADM

## 2023-12-10 RX ORDER — HYDROCODONE BITARTRATE AND ACETAMINOPHEN 5; 325 MG/1; MG/1
1 TABLET ORAL EVERY 6 HOURS PRN
COMMUNITY

## 2023-12-10 RX ORDER — TALC
6 POWDER (GRAM) TOPICAL NIGHTLY PRN
Status: DISCONTINUED | OUTPATIENT
Start: 2023-12-10 | End: 2023-12-13 | Stop reason: HOSPADM

## 2023-12-10 RX ORDER — POTASSIUM CHLORIDE 1500 MG/1
1 TABLET, EXTENDED RELEASE ORAL DAILY
COMMUNITY

## 2023-12-10 RX ORDER — ALBUTEROL SULFATE 90 UG/1
2 AEROSOL, METERED RESPIRATORY (INHALATION) EVERY 4 HOURS PRN
Status: DISCONTINUED | OUTPATIENT
Start: 2023-12-10 | End: 2023-12-10

## 2023-12-10 RX ORDER — AMIODARONE HYDROCHLORIDE 200 MG/1
200 TABLET ORAL DAILY
Status: DISCONTINUED | OUTPATIENT
Start: 2023-12-11 | End: 2023-12-13 | Stop reason: HOSPADM

## 2023-12-10 RX ORDER — AZITHROMYCIN 250 MG/1
500 TABLET, FILM COATED ORAL ONCE
Status: ON HOLD | COMMUNITY
Start: 2023-12-07 | End: 2023-12-13 | Stop reason: HOSPADM

## 2023-12-10 RX ORDER — NEOMYCIN SULFATE, POLYMYXIN B SULFATE AND GRAMICIDIN 1.75; 10000; .025 MG/ML; [USP'U]/ML; MG/ML
1 SOLUTION/ DROPS OPHTHALMIC 4 TIMES DAILY
COMMUNITY
End: 2023-12-10

## 2023-12-10 RX ORDER — GABAPENTIN 100 MG/1
100 CAPSULE ORAL 2 TIMES DAILY
Status: DISCONTINUED | OUTPATIENT
Start: 2023-12-10 | End: 2023-12-13 | Stop reason: HOSPADM

## 2023-12-10 RX ORDER — LEVOFLOXACIN 5 MG/ML
750 INJECTION, SOLUTION INTRAVENOUS
Status: ACTIVE | OUTPATIENT
Start: 2023-12-10 | End: 2023-12-11

## 2023-12-10 RX ORDER — PANTOPRAZOLE SODIUM 40 MG/10ML
40 INJECTION, POWDER, LYOPHILIZED, FOR SOLUTION INTRAVENOUS DAILY
Status: DISCONTINUED | OUTPATIENT
Start: 2023-12-10 | End: 2023-12-11

## 2023-12-10 RX ORDER — ALBUTEROL SULFATE 0.83 MG/ML
2.5 SOLUTION RESPIRATORY (INHALATION) EVERY 4 HOURS PRN
Status: DISCONTINUED | OUTPATIENT
Start: 2023-12-10 | End: 2023-12-13 | Stop reason: HOSPADM

## 2023-12-10 RX ORDER — METOPROLOL SUCCINATE 25 MG/1
25 TABLET, EXTENDED RELEASE ORAL DAILY
Status: DISCONTINUED | OUTPATIENT
Start: 2023-12-11 | End: 2023-12-13 | Stop reason: HOSPADM

## 2023-12-10 RX ORDER — ENOXAPARIN SODIUM 100 MG/ML
40 INJECTION SUBCUTANEOUS EVERY 24 HOURS
Status: DISCONTINUED | OUTPATIENT
Start: 2023-12-10 | End: 2023-12-12

## 2023-12-10 RX ORDER — CLOPIDOGREL BISULFATE 75 MG/1
75 TABLET ORAL DAILY
Status: DISCONTINUED | OUTPATIENT
Start: 2023-12-11 | End: 2023-12-12

## 2023-12-10 RX ORDER — ASPIRIN 81 MG/1
81 TABLET ORAL DAILY
Status: DISCONTINUED | OUTPATIENT
Start: 2023-12-11 | End: 2023-12-13 | Stop reason: HOSPADM

## 2023-12-10 RX ADMIN — IOHEXOL 100 ML: 350 INJECTION, SOLUTION INTRAVENOUS at 01:12

## 2023-12-10 RX ADMIN — CEFTRIAXONE SODIUM 1 G: 1 INJECTION, POWDER, FOR SOLUTION INTRAMUSCULAR; INTRAVENOUS at 06:12

## 2023-12-10 RX ADMIN — PANTOPRAZOLE SODIUM 40 MG: 40 INJECTION, POWDER, LYOPHILIZED, FOR SOLUTION INTRAVENOUS at 06:12

## 2023-12-10 RX ADMIN — ACETAMINOPHEN 650 MG: 325 TABLET ORAL at 06:12

## 2023-12-10 RX ADMIN — VANCOMYCIN HYDROCHLORIDE 1250 MG: 1.25 INJECTION, POWDER, LYOPHILIZED, FOR SOLUTION INTRAVENOUS at 03:12

## 2023-12-10 RX ADMIN — SODIUM CHLORIDE, PRESERVATIVE FREE 10 ML: 5 INJECTION INTRAVENOUS at 09:12

## 2023-12-10 RX ADMIN — ENOXAPARIN SODIUM 40 MG: 40 INJECTION SUBCUTANEOUS at 06:12

## 2023-12-10 RX ADMIN — GABAPENTIN 100 MG: 100 CAPSULE ORAL at 09:12

## 2023-12-10 RX ADMIN — Medication 6 MG: at 09:12

## 2023-12-10 NOTE — PROGRESS NOTES
Automatic Inhaler to Nebulizer Interchange    albuterol (Ventolin, ProAir, or Proventil)  mcg given multiple times per day changed to albuterol 2.5 mg every 4 hours as needed for wheezing or SOB  per [facility] Automatic Therapeutic Substitutions Protocol.    Please contact pharmacy at extension [phone] with any questions.     Thank you,   Nisreen Floyd

## 2023-12-10 NOTE — SUBJECTIVE & OBJECTIVE
Past Medical History:   Diagnosis Date    Arthritis     Cardiac arrest 10/2023    Cataract     Colon polyp     Diabetes mellitus type II 2012    Encounter for blood transfusion     Extra-ovarian endometrioid adenocarcinoma 2020    GERD (gastroesophageal reflux disease)     History of chronic cough     clear productive    Hyperlipidemia     Hypertension     Macular degeneration     Ovarian cancer     PONV (postoperative nausea and vomiting)     Squamous cell carcinoma 1980's    precancer of cervix       Past Surgical History:   Procedure Laterality Date    AUGMENTATION OF BREAST      BRONCHOSCOPY WITH FLUOROSCOPY Right 05/11/2023    Procedure: BRONCHOSCOPY, WITH FLUOROSCOPY;  Surgeon: Neva Christian MD;  Location: [facility];  Service: Pulmonary;  Laterality: Right;    CATARACT EXTRACTION BILATERAL W/ ANTERIOR VITRECTOMY Bilateral     CHOLECYSTECTOMY      COLONOSCOPY      COLONOSCOPY N/A 04/20/2023    Procedure: COLONOSCOPY;  Surgeon: Sabino Stephenson MD;  Location: [facility];  Service: Endoscopy;  Laterality: N/A;    CORONARY STENT PLACEMENT  10/2023    x 3    ESOPHAGOGASTRODUODENOSCOPY N/A 06/20/2018    Procedure: EGD (ESOPHAGOGASTRODUODENOSCOPY);  Surgeon: Sabino Stephenson MD;  Location: [facility];  Service: Endoscopy;  Laterality: N/A;    ESOPHAGOGASTRODUODENOSCOPY N/A 03/31/2023    Procedure: EGD (ESOPHAGOGASTRODUODENOSCOPY);  Surgeon: Sabino Stephenson MD;  Location: [facility];  Service: Endoscopy;  Laterality: N/A;    HYSTERECTOMY  1976    partial    INJECTION OF ANESTHETIC AGENT AROUND MEDIAL BRANCH NERVES INNERVATING LUMBAR FACET JOINT Right 05/10/2023    Procedure: Block-nerve-Lateral-branch-lumbar;  Surgeon: Agustin Robles MD;  [facility];  Service: Pain Management;  Laterality: Right;  L5 and s1,s2 LBB    INJECTION OF ANESTHETIC AGENT AROUND MEDIAL BRANCH NERVES INNERVATING LUMBAR FACET JOINT Right 05/30/2023    Procedure: Block-nerve-medial branch-lumbar;  Surgeon: Agustin Robles MD;  [facility];  Service: Pain Management;  Laterality: Right;  L5, s1 ,s2 LBB #2    INJECTION OF ANESTHETIC AGENT AROUND NERVE Right 10/31/2023    Procedure: INTERCOSTAL NERVE BLOCK;  Surgeon: Washington Shankar MD;  Location: [facility] OR;  Service: Cardiothoracic;  Laterality: Right;    INJECTION, SACROILIAC JOINT Right 01/26/2023    Procedure: INJECTION,SACROILIAC JOINT;  Surgeon: Agustin Robles MD;  Location: [facility] OR;  Service: Pain Management;  Laterality: Right;    INSERTION OF TUNNELED CENTRAL VENOUS CATHETER (CVC) WITH SUBCUTANEOUS PORT Right 10/19/2020    Procedure: INSERTION, PORT-A-CATH;  Surgeon: Misti Mcfarland MD;  Location: [facility] OR;  Service: General;  Laterality: Right;    INTRAMEDULLARY RODDING OF TROCHANTER OF FEMUR Right 11/28/2021    Procedure: INSERTION, INTRAMEDULLARY LUC, FEMUR, TROCHANTER/RIGHT TFN DR FAJARDO NOTIFIED REP;  Surgeon: Kp Fajardo MD;  Location: [facility] OR;  Service: Orthopedics;  Laterality: Right;  SKIP    ROBOT-ASSISTED LAPAROSCOPIC LYMPHADENECTOMY USING DA NELLA XI N/A 09/21/2020    Procedure: XI ROBOTIC LYMPHADENECTOMY-pelvic and kell-aortic;  Surgeon: Altagracia Ray MD;  Location: [facility] OR;  Service: OB/GYN;  Laterality: N/A;    ROBOT-ASSISTED LAPAROSCOPIC OMENTECTOMY USING DA NELLA XI N/A 09/21/2020    Procedure: XI ROBOTIC OMENTECTOMY;  Surgeon: Altagracia Ray MD;  Location: [facility] OR;  Service: OB/GYN;  Laterality: N/A;    ROBOT-ASSISTED LAPAROSCOPIC SALPINGO-OOPHORECTOMY USING DA NELLA XI Bilateral 09/21/2020    Procedure: XI ROBOTIC SALPINGO-OOPHORECTOMY;  Surgeon: Altagracia Ray MD;  Location: [facility] OR;  Service: OB/GYN;  Laterality: Bilateral;    THORACOSCOPIC BIOPSY OF PLEURA Right 10/31/2023    Procedure: VATS, WITH PLEURA BIOPSY;  Surgeon: Washington Shankar MD;  Location: [facility] OR;  Service: Cardiothoracic;  Laterality: Right;  FROZEN SECTION   **KRISTEN-BETOT**    THORACOTOMY Right 10/31/2023    Procedure: THORACOTOMY;  Surgeon: Washington Shankar MD;  Location:  [facility] OR;  Service: Cardiothoracic;  Laterality: Right;    UPPER GASTROINTESTINAL ENDOSCOPY         Review of patient's allergies indicates:  No Known Allergies    No current facility-administered medications on file prior to encounter.     Current Outpatient Medications on File Prior to Encounter   Medication Sig    albuterol (VENTOLIN HFA) 90 mcg/actuation inhaler Inhale 2 puffs into the lungs every 4 (four) hours as needed for Wheezing or Shortness of Breath. Rescue    amiodarone (PACERONE) 200 MG Tab Take 1 tablet (200 mg total) by mouth 2 (two) times daily.    aspirin (ECOTRIN) 81 MG EC tablet Take 81 mg by mouth once daily.    azithromycin (Z-DEJAN) 250 MG tablet Take 500 mg by mouth once.    cefdinir (OMNICEF) 300 MG capsule Take 1 capsule (300 mg total) by mouth 2 (two) times daily. for 5 days    clopidogreL (PLAVIX) 75 mg tablet Take 75 mg by mouth once daily.    famotidine (PEPCID) 20 MG tablet Take 1 tablet (20 mg total) by mouth once daily. for 30 doses    gabapentin (NEURONTIN) 100 MG capsule TAKE 1 CAPSULE(100 MG) BY MOUTH TWICE DAILY (Patient taking differently: Take 100 mg by mouth 2 (two) times daily.)    HYDROcodone-acetaminophen (NORCO) 5-325 mg per tablet Take 1 tablet by mouth every 6 (six) hours as needed for Pain.    magnesium oxide (MAG-OX) 400 mg (241.3 mg magnesium) tablet Take 400 mg by mouth once daily.    melatonin 5 mg Chew Take 2 tablets by mouth nightly as needed (insomnia).    metFORMIN (GLUCOPHAGE) 500 MG tablet TAKE 1 TABLET(500 MG) BY MOUTH TWICE DAILY WITH MEALS (Patient taking differently: Take 500 mg by mouth 2 (two) times a day.)    metoprolol succinate (TOPROL-XL) 25 MG 24 hr tablet Take 25 mg by mouth once daily.    neomycin-polymyxin-gramicidin (NEOSPORIN) ophthalmic solution Place 1 drop into both eyes 4 (four) times daily.    potassium chloride (K-TAB) 20 mEq Take 1 tablet by mouth once daily.    VIT A/VIT C/VIT E/ZINC/COPPER (ICAPS AREDS ORAL) Take 1 capsule by mouth 2  (two) times a day.      Family History       Problem Relation (Age of Onset)    Breast cancer Maternal Grandmother, Paternal Grandmother    Cancer Mother (57), Father (56)    Colon polyps Daughter    Melanoma Brother    Skin cancer Sister, Brother, Brother          Tobacco Use    Smoking status: Former     Current packs/day: 0.00     Average packs/day: 1 pack/day for 20.0 years (20.0 ttl pk-yrs)     Types: Cigarettes     Start date:      Quit date:      Years since quittin.9    Smokeless tobacco: Never    Tobacco comments:     quit 40 yrs ago   Substance and Sexual Activity    Alcohol use: Yes     Alcohol/week: 1.0 standard drink of alcohol     Types: 1 Glasses of wine per week     Comment: occasional    Drug use: No    Sexual activity: Not Currently     Partners: Male     Review of Systems   Constitutional: Negative.    HENT: Negative.     Eyes: Negative.    Respiratory:  Positive for shortness of breath. Negative for wheezing and stridor.    Cardiovascular: Negative.    Gastrointestinal:  Positive for nausea. Negative for constipation, diarrhea and vomiting.        Epigastric discomfort   Endocrine: Negative.    Genitourinary: Negative.    Musculoskeletal: Negative.    Skin: Negative.    Allergic/Immunologic: Positive for immunocompromised state.   Neurological: Negative.    Hematological: Negative.    Psychiatric/Behavioral: Negative.       Objective:     Vital Signs (Most Recent):  Temp: 97.8 °F (36.6 °C) (12/10/23 1020)  Pulse: 92 (12/10/23 1400)  Resp: (!) 29 (12/10/23 1400)  BP: (!) 171/80 (12/10/23 1400)  SpO2: 98 % (12/10/23 1400) Vital Signs (24h Range):  Temp:  [97.8 °F (36.6 °C)] 97.8 °F (36.6 °C)  Pulse:  [82-97] 92  Resp:  [18-29] 29  SpO2:  [94 %-98 %] 98 %  BP: (134-171)/(63-87) 171/80     Weight: 52.2 kg (115 lb)  Body mass index is 20.37 kg/m².     Physical Exam  Vitals and nursing note reviewed.   Constitutional:       General: She is not in acute distress.     Appearance: Normal  appearance.   HENT:      Head: Normocephalic and atraumatic.      Nose: Nose normal.   Eyes:      Extraocular Movements: Extraocular movements intact.      Conjunctiva/sclera: Conjunctivae normal.   Cardiovascular:      Rate and Rhythm: Normal rate and regular rhythm.   Pulmonary:      Effort: Pulmonary effort is normal. No respiratory distress.      Breath sounds: No stridor. Rales present. No wheezing.   Abdominal:      General: Bowel sounds are normal. There is no distension.      Palpations: Abdomen is soft.      Tenderness: There is no abdominal tenderness.   Musculoskeletal:         General: No swelling or tenderness. Normal range of motion.      Cervical back: Normal range of motion and neck supple.      Right lower leg: No edema.      Left lower leg: No edema.   Skin:     General: Skin is warm and dry.   Neurological:      General: No focal deficit present.      Mental Status: She is alert and oriented to person, place, and time.                Significant Labs: All pertinent labs within the past 24 hours have been reviewed.  Recent Lab Results         12/10/23  1105        Albumin 3.0       ALP 40       ALT 25       Anion Gap 7       AST 16       Baso # 0.03       Basophil % 0.4       BILIRUBIN TOTAL 0.3  Comment: For infants and newborns, interpretation of results should be based  on gestational age, weight and in agreement with clinical  observations.    Premature Infant recommended reference ranges:  Up to 24 hours.............<8.0 mg/dL  Up to 48 hours............<12.0 mg/dL  3-5 days..................<15.0 mg/dL  6-29 days.................<15.0 mg/dL           Comment: Values of less than 100 pg/ml are consistent with non-CHF populations.       BUN 26       Calcium 8.7       Chloride 104       CO2 27       Creatinine 1.0       Differential Method Automated       eGFR 56.2       Eos # 0.2       Eosinophil % 2.5       Glucose 183       Gran # (ANC) 5.4       Gran % 78.1       Hematocrit 31.4        Hemoglobin 10.0       Immature Grans (Abs) 0.05  Comment: Mild elevation in immature granulocytes is non specific and   can be seen in a variety of conditions including stress response,   acute inflammation, trauma and pregnancy. Correlation with other   laboratory and clinical findings is essential.         Immature Granulocytes 0.7       Lipase 34       Lymph # 0.7       Lymph % 10.5       Magnesium  1.7       MCH 28.5       MCHC 31.8       MCV 90       Mono # 0.5       Mono % 7.8       MPV 9.0       nRBC 0       Platelet Estimate Increased       Platelet Count 497  Comment: Delta check review       Potassium 3.7       PROTEIN TOTAL 6.2       RBC 3.51       RDW 14.8       Sodium 138       Troponin I High Sensitivity 9.7  Comment: Troponin results differ between methods. Do not use   results between Troponin methods interchangeably.    Alkaline Phospatase levels above 400 U/L may   cause false positive results.    Access hsTnI should not be used for patients taking   Asfotase lizzeth (Strensiq).         WBC 6.88               Significant Imaging: I have reviewed all pertinent imaging results/findings within the past 24 hours.

## 2023-12-10 NOTE — ASSESSMENT & PLAN NOTE
Patient was recently treated for multifocal pneumonia and pleural effusion status post thoracentesis.  Patient was discharged home on December 6 with cefdinir and azithromycin.  Patient was unable to  the antibiotics from pharmacy.  CT shows progression of airspace disease and moderate right pleural effusion.  Repeat blood cultures.  Continue ceftriaxone and azithromycin.  Albuterol p.r.n..  Pulmonology consulted.

## 2023-12-10 NOTE — PHARMACY MED REC
"              .        Admission Medication History     The home medication history was taken by Sofie Robertson.    You may go to "Admission" then "Reconcile Home Medications" tabs to review and/or act upon these items.     The home medication list has been updated by the Pharmacy department.   Please read ALL comments highlighted in yellow.   Please address this information as you see fit.    Feel free to contact us if you have any questions or require assistance.          Medications listed below were obtained from: Patient/family and Analytic software- [employer]  No current facility-administered medications on file prior to encounter.     Current Outpatient Medications on File Prior to Encounter   Medication Sig Dispense Refill    albuterol (VENTOLIN HFA) 90 mcg/actuation inhaler Inhale 2 puffs into the lungs every 4 (four) hours as needed for Wheezing or Shortness of Breath. Rescue 6.7 g 1    amiodarone (PACERONE) 200 MG Tab Take 1 tablet (200 mg total) by mouth 2 (two) times daily. 60 tablet 11    aspirin (ECOTRIN) 81 MG EC tablet Take 81 mg by mouth once daily.      clopidogreL (PLAVIX) 75 mg tablet Take 75 mg by mouth once daily.      famotidine (PEPCID) 20 MG tablet Take 1 tablet (20 mg total) by mouth once daily. for 30 doses 30 tablet 0    gabapentin (NEURONTIN) 100 MG capsule TAKE 1 CAPSULE(100 MG) BY MOUTH TWICE DAILY (Patient taking differently: Take 100 mg by mouth 2 (two) times daily.) 180 capsule 3    magnesium oxide (MAG-OX) 400 mg (241.3 mg magnesium) tablet Take 400 mg by mouth once daily.      melatonin 5 mg Chew Take 1 tablet by mouth nightly as needed (insomnia).      metFORMIN (GLUCOPHAGE) 500 MG tablet TAKE 1 TABLET(500 MG) BY MOUTH TWICE DAILY WITH MEALS (Patient taking differently: Take 500 mg by mouth 2 (two) times a day.) 180 tablet 0    metoprolol succinate (TOPROL-XL) 25 MG 24 hr tablet Take 25 mg by mouth once daily.      VIT A/VIT C/VIT E/ZINC/COPPER (ICAPS AREDS ORAL) Take " 1 capsule by mouth 2 (two) times a day.       azithromycin (Z-DEJAN) 250 MG tablet Take 500 mg by mouth once.      cefdinir (OMNICEF) 300 MG capsule Take 1 capsule (300 mg total) by mouth 2 (two) times daily. for 5 days (Patient not taking: Reported on 12/10/2023) 10 capsule 0    HYDROcodone-acetaminophen (NORCO) 5-325 mg per tablet Take 1 tablet by mouth every 6 (six) hours as needed for Pain.      potassium chloride (K-TAB) 20 mEq Take 1 tablet by mouth once daily.      [DISCONTINUED] neomycin-polymyxin-gramicidin (NEOSPORIN) ophthalmic solution Place 1 drop into both eyes 4 (four) times daily.         Potential issues to be addressed PRIOR TO DISCHARGE  Patient reported not taking the following medications: (Hydrocodone, Cefdinir, Zpak, Potassium). These medications remain on the home medication list. Please address accordingly.     Sofie Robertson  EXT 1924

## 2023-12-10 NOTE — HPI
Patient is an 83-year-old female with history of CAD status post PCI, VFib arrest, type 2 diabetes, hyperlipidemia, hypertension, right lobe adenocarcinoma status post lobectomy who presents with epigastric discomfort.  Patient says this began about 3 days ago and have been worsening.  Patient denies any abdominal pain but says it is very uncomfortable.  Has had associated nausea but no vomiting.  Patient says she is constantly burping.  Denies any constipation or diarrhea.  Patient was recently treated for multifocal pneumonia and pleural effusion and discharged home on December 6 with cefdinir and azithromycin.  Patient says she never took any of the antibiotics at home as she says her  did not pick them up.  Patient does have shortness of breath with exertion.  Denies any fevers, chills, or cough.  In ED, patient was 94% on room air.  WBC 6.8 and hemoglobin 10.0.  Creatinine 1.0.  .    Chest x-ray  Progression of the alveolar consolidation in the right mid and lower lung with small right pleural effusion.  There are groundglass and interstitial opacities in the right upper lobe    CT chest abdomen pelvis   Progression of the alveolar consolidation in the right mid and lower lung with moderate size right pleural effusion. There is progression of the airspace disease within the right upper lobe with patchy groundglass opacity left upper lobe and left lung base. Findings are compatible with multifocal.  Moderate vascular calcification of the coronary arteries and aorta  Fatty infiltration of the liver with stable hypodense lesions within the liver compatible with cysts  Colonic diverticulosis without diverticulitis  Prior cholecystectomy and hysterectomy  Stable rim calcified mass in the right upper quadrant  Degenerative changes of the spine

## 2023-12-10 NOTE — ED PROVIDER NOTES
Encounter Date: 12/10/2023       History     Chief Complaint   Patient presents with    Nausea     FEELING THE NEED TO CONSTANTLY BURP    Cough     Dx with pneumonia recently    Shortness of Breath     With exertion     83-year-old female who has a prior history of diabetes mellitus, hyperlipidemia, hypertension, ovarian cancer, right lung cancer for which she had a right lower lobectomy, presents with complaints of having nausea and cough which is for the most part nonproductive.  No history of any wheezing.  She does complain of feeling weak.  Appetite has been diminished.  States she is lost approximately 10 lb over the last 2 months.  No history any fever.  The patient has had surgery on her lung but no history any radiation or chemo.  She also complains of epigastric discomfort in his sensation that she has to belch but unable to do so.  Bowel habits are normal.  Patient states that subsequent to her lung surgery she would an effusion that required drainage and developed pneumonia there afterwards.  She is had no recent fever.  Currently denies any complaints of any shortness of breath chest pain or wheezing.  No pleuritic symptoms.      Review of patient's allergies indicates:  No Known Allergies  Past Medical History:   Diagnosis Date    Arthritis     Cardiac arrest 10/2023    Cataract     Colon polyp     Diabetes mellitus type II 2012    Encounter for blood transfusion     Extra-ovarian endometrioid adenocarcinoma 2020    GERD (gastroesophageal reflux disease)     History of chronic cough     clear productive    Hyperlipidemia     Hypertension     Macular degeneration     Ovarian cancer     PONV (postoperative nausea and vomiting)     Squamous cell carcinoma 1980's    precancer of cervix     Past Surgical History:   Procedure Laterality Date    AUGMENTATION OF BREAST      BRONCHOSCOPY WITH FLUOROSCOPY Right 05/11/2023    Procedure: BRONCHOSCOPY, WITH FLUOROSCOPY;  Surgeon: Neva Christian MD;  Location:  [facility] ENDO;  Service: Pulmonary;  Laterality: Right;    CATARACT EXTRACTION BILATERAL W/ ANTERIOR VITRECTOMY Bilateral     CHOLECYSTECTOMY      COLONOSCOPY      COLONOSCOPY N/A 04/20/2023    Procedure: COLONOSCOPY;  Surgeon: Sabino Stephenson MD;  Location: [facility] ENDO;  Service: Endoscopy;  Laterality: N/A;    CORONARY STENT PLACEMENT  10/2023    x 3    ESOPHAGOGASTRODUODENOSCOPY N/A 06/20/2018    Procedure: EGD (ESOPHAGOGASTRODUODENOSCOPY);  Surgeon: Sabino Stephenson MD;  Location: [facility] ENDO;  Service: Endoscopy;  Laterality: N/A;    ESOPHAGOGASTRODUODENOSCOPY N/A 03/31/2023    Procedure: EGD (ESOPHAGOGASTRODUODENOSCOPY);  Surgeon: Sabino Stephenson MD;  Location: [facility] ENDO;  Service: Endoscopy;  Laterality: N/A;    HYSTERECTOMY  1976    partial    INJECTION OF ANESTHETIC AGENT AROUND MEDIAL BRANCH NERVES INNERVATING LUMBAR FACET JOINT Right 05/10/2023    Procedure: Block-nerve-Lateral-branch-lumbar;  Surgeon: Agustin Robles MD;  Location: [facility];  Service: Pain Management;  Laterality: Right;  L5 and s1,s2 LBB    INJECTION OF ANESTHETIC AGENT AROUND MEDIAL BRANCH NERVES INNERVATING LUMBAR FACET JOINT Right 05/30/2023    Procedure: Block-nerve-medial branch-lumbar;  Surgeon: Agustin Robles MD;  Location: [facility];  Service: Pain Management;  Laterality: Right;  L5, s1 ,s2 LBB #2    INJECTION OF ANESTHETIC AGENT AROUND NERVE Right 10/31/2023    Procedure: INTERCOSTAL NERVE BLOCK;  Surgeon: Washington Shankar MD;  Location: [facility];  Service: Cardiothoracic;  Laterality: Right;    INJECTION, SACROILIAC JOINT Right 01/26/2023    Procedure: INJECTION,SACROILIAC JOINT;  Surgeon: Agustin Robles MD;  Location: [facility];  Service: Pain Management;  Laterality: Right;    INSERTION OF TUNNELED CENTRAL VENOUS CATHETER (CVC) WITH SUBCUTANEOUS PORT Right 10/19/2020    Procedure: INSERTION, PORT-A-CATH;  Surgeon: Misti Mcfarland MD;  Location: [facility];  Service: General;  Laterality: Right;    INTRAMEDULLARY RODDING OF TROCHANTER OF FEMUR  Right 11/28/2021    Procedure: INSERTION, INTRAMEDULLARY LUC, FEMUR, TROCHANTER/RIGHT TFN DR FAJARDO NOTIFIED REP;  Surgeon: Kp Fajardo MD;  Location: [facility] OR;  Service: Orthopedics;  Laterality: Right;  SKIP    ROBOT-ASSISTED LAPAROSCOPIC LYMPHADENECTOMY USING DA NELLA XI N/A 09/21/2020    Procedure: XI ROBOTIC LYMPHADENECTOMY-pelvic and kell-aortic;  Surgeon: Altagracia Ray MD;  Location: [facility] OR;  Service: OB/GYN;  Laterality: N/A;    ROBOT-ASSISTED LAPAROSCOPIC OMENTECTOMY USING DA NELLA XI N/A 09/21/2020    Procedure: XI ROBOTIC OMENTECTOMY;  Surgeon: Altagracia Ray MD;  Location: [facility] OR;  Service: OB/GYN;  Laterality: N/A;    ROBOT-ASSISTED LAPAROSCOPIC SALPINGO-OOPHORECTOMY USING DA NELLA XI Bilateral 09/21/2020    Procedure: XI ROBOTIC SALPINGO-OOPHORECTOMY;  Surgeon: Altagracia Ray MD;  Location: [facility] OR;  Service: OB/GYN;  Laterality: Bilateral;    THORACOSCOPIC BIOPSY OF PLEURA Right 10/31/2023    Procedure: VATS, WITH PLEURA BIOPSY;  Surgeon: Washington Shankar MD;  Location: [facility];  Service: Cardiothoracic;  Laterality: Right;  FROZEN SECTION   **KRISTEN-ASSISDT**    THORACOTOMY Right 10/31/2023    Procedure: THORACOTOMY;  Surgeon: Washington Shankar MD;  Location: [facility];  Service: Cardiothoracic;  Laterality: Right;    UPPER GASTROINTESTINAL ENDOSCOPY       Family History   Problem Relation Age of Onset    Cancer Mother 57        lung    Cancer Father 56        oral    Skin cancer Sister     Skin cancer Brother     Melanoma Brother     Skin cancer Brother     Breast cancer Maternal Grandmother     Breast cancer Paternal Grandmother     Colon polyps Daughter     Psoriasis Neg Hx     Lupus Neg Hx     Eczema Neg Hx     Colon cancer Neg Hx     Crohn's disease Neg Hx     Esophageal cancer Neg Hx     Stomach cancer Neg Hx     Ulcerative colitis Neg Hx      Social History     Tobacco Use    Smoking status: Former     Current packs/day: 0.00     Average packs/day: 1 pack/day for  20.0 years (20.0 ttl pk-yrs)     Types: Cigarettes     Start date:      Quit date: 1981     Years since quittin.9    Smokeless tobacco: Never    Tobacco comments:     quit 40 yrs ago   Substance Use Topics    Alcohol use: Yes     Alcohol/week: 1.0 standard drink of alcohol     Types: 1 Glasses of wine per week     Comment: occasional    Drug use: No     Review of Systems   Constitutional:  Positive for appetite change. Negative for activity change, chills, diaphoresis and fever.   HENT:  Negative for congestion, ear pain, postnasal drip, rhinorrhea, sinus pressure, sinus pain, sore throat and trouble swallowing.    Respiratory:  Positive for cough. Negative for shortness of breath.    Cardiovascular:  Negative for chest pain and palpitations.   Gastrointestinal:  Positive for abdominal pain. Negative for constipation, diarrhea, nausea and vomiting.   Genitourinary:  Negative for difficulty urinating and dysuria.   Musculoskeletal:  Negative for back pain.   Skin:  Negative for color change, pallor and rash.   Neurological:  Negative for weakness and headaches.   Hematological:  Does not bruise/bleed easily.   All other systems reviewed and are negative.      Physical Exam     Initial Vitals [12/10/23 1020]   BP Pulse Resp Temp SpO2   (!) 157/87 97 (!) 22 97.8 °F (36.6 °C) (!) 94 %      MAP       --         Physical Exam    Vitals reviewed.  Constitutional: She appears well-developed and well-nourished. She is not diaphoretic. No distress.   Intermittent coughing   HENT:   Head: Normocephalic and atraumatic.   Nose: Nose normal.   Mouth/Throat: Oropharynx is clear and moist. No oropharyngeal exudate.   Eyes: Conjunctivae are normal. Pupils are equal, round, and reactive to light.   Neck: Neck supple. No JVD present.   Normal range of motion.  Cardiovascular:  Normal rate, regular rhythm, normal heart sounds and intact distal pulses.     Exam reveals no gallop and no friction rub.       No murmur  heard.  Pulmonary/Chest: She has no rhonchi. She has rales. She exhibits no tenderness.   Patient has very hard breast prosthesis   Abdominal: Abdomen is soft. Bowel sounds are normal. She exhibits no distension and no mass. There is no abdominal tenderness. There is no rebound and no guarding.   Musculoskeletal:         General: No tenderness or edema. Normal range of motion.      Cervical back: Normal range of motion and neck supple.     Lymphadenopathy:     She has no cervical adenopathy.   Neurological: She is alert and oriented to person, place, and time. She has normal strength. GCS score is 15. GCS eye subscore is 4. GCS verbal subscore is 5. GCS motor subscore is 6.   Skin: Skin is warm and dry. Capillary refill takes less than 2 seconds. No rash noted. No erythema. No pallor.   Psychiatric: She has a normal mood and affect. Her behavior is normal. Judgment and thought content normal.         ED Course   Procedures  Labs Reviewed   CBC W/ AUTO DIFFERENTIAL - Abnormal; Notable for the following components:       Result Value    RBC 3.51 (*)     Hemoglobin 10.0 (*)     Hematocrit 31.4 (*)     MCHC 31.8 (*)     RDW 14.8 (*)     Platelets 497 (*)     MPV 9.0 (*)     Immature Granulocytes 0.7 (*)     Immature Grans (Abs) 0.05 (*)     Lymph # 0.7 (*)     Gran % 78.1 (*)     Lymph % 10.5 (*)     Platelet Estimate Increased (*)     All other components within normal limits   COMPREHENSIVE METABOLIC PANEL - Abnormal; Notable for the following components:    Glucose 183 (*)     BUN 26 (*)     Albumin 3.0 (*)     Alkaline Phosphatase 40 (*)     eGFR 56.2 (*)     Anion Gap 7 (*)     All other components within normal limits   B-TYPE NATRIURETIC PEPTIDE - Abnormal; Notable for the following components:     (*)     All other components within normal limits   CULTURE, BLOOD   CULTURE, BLOOD   MAGNESIUM   TROPONIN I HIGH SENSITIVITY   LIPASE   LACTIC ACID, PLASMA        ECG Results              EKG 12-lead (In  process)  Result time 12/10/23 10:54:02      In process by Interface, Lab In [facility] (12/10/23 10:54:02)                   Narrative:    Test Reason : R06.02,    Vent. Rate : 095 BPM     Atrial Rate : 095 BPM     P-R Int : 128 ms          QRS Dur : 064 ms      QT Int : 376 ms       P-R-T Axes : 060 041 088 degrees     QTc Int : 472 ms    Sinus rhythm with Premature supraventricular complexes  Anteroseptal infarct ,age undetermined  Abnormal ECG  When compared with ECG of 03-DEC-2023 18:38,  Premature supraventricular complexes are now Present  Anteroseptal infarct is now Present    Referred By: [email or web address]   SELF           Confirmed By:                                   Imaging Results              CT Chest Abdomen Pelvis With IV Contrast (XPD) NO Oral Contrast (Edited Result - FINAL)  Result time 12/10/23 13:42:34   Procedure changed from CT Abdomen Pelvis With IV Contrast NO Oral Contrast     Edited Result - FINAL by Chichi Gann MD (12/10/23 13:42:34)                   Narrative:         ADDENDUM #1       This is a correction to the impression    Findings are compatible with multifocal pneumonia.    Electronically signed by:  Chichi Gann MD  12/10/2023 01:42 PM CST Workstation: [id number]       ORIGINAL REPORT       CMS MANDATED QUALITY DATA - CT RADIATION - 436    All CT scans at this facility utilize dose modulation, iterative reconstruction, and/or weight based dosing when appropriate to reduce radiation dose to as low as reasonably achievable.        Reason: Upper Abdominal pain , lung cancer, right thoracotomy    TECHNIQUE: CT thorax, abdomen, and pelvis with 100 mL Omnipaque 350.    COMPARISON: CTA chest dated 12/4/2023 PET/CT dated 8/15/2023    CT THORAX:  Base of the neck is unremarkable. There are stable tiny thyroid nodules. There is no supraclavicular adenopathy.  There is a right IJ Port-A-Cath with the tip in the superior vena cava.    LUNGS: Progression of the alveolar consolidation in the  right mid and lower lobe with moderate size right pleural effusion. There is progression of the groundglass and interstitial opacities in the right upper lobe.  Patchy groundglass opacities in the lateral left upper lobe and posterior left lower lobe.  There are mild emphysematous changes.    Heart is normal in size. There is multivessel coronary artery calcification. Aorta is normal in caliber.  Mediastinum: There is no pathologic adenopathy. There are mildly prominent mediastinal lymph nodes which may be reactive.    There are bilateral rim calcified breast implants. The musculature is normal.  There are degenerative changes of the spine. There are no lytic or blastic lesions.    CT ABDOMEN:  Liver is normal in size and contour. The liver is hypodense compatible with fatty infiltration.  There is a stable 13 mm low-density lesion within the anterior segment of the right lobe of the liver. There is a 4 cm cyst within the posterior segment of the right lobe of the liver.  There is no biliary duct dilatation. The gallbladder is absent.    Spleen is normal in size and contour    Pancreas demonstrate normal enhancement. There is no focal mass.    There are no thick-walled or dilated bowel loops. There is colonic diverticulosis without diverticulitis.  There is no mesenteric or retroperitoneal adenopathy.  There is a stable 3.1 cm rim calcified mass in the right upper quadrant  The aorta is normal in caliber. There is moderate vascular calcification.    There is no free fluid or free air.  The musculature is normal.  There is rightward curvature of the lumbar spine. There are degenerative changes of the spine without lytic or blastic lesions.    CT PELVIS:  The bladder is normal. There is colonic diverticulosis. There is no pelvic adenopathy. There is hardware within the right hip.    IMPRESSION:  Progression of the alveolar consolidation in the right mid and lower lung with moderate size right pleural effusion. There  is progression of the airspace disease within the right upper lobe with patchy groundglass opacity left upper lobe and left lung base. Findings are compatible with multifocal.    Moderate vascular calcification of the coronary arteries and aorta    Fatty infiltration of the liver with stable hypodense lesions within the liver compatible with cysts    Colonic diverticulosis without diverticulitis    Prior cholecystectomy and hysterectomy    Stable rim calcified mass in the right upper quadrant    Degenerative changes of the spine    Electronically signed by:  Chichi Gann MD  12/10/2023 01:29 PM CST Workstation: [id number]                      Final result by Chichi Gann MD (12/10/23 13:29:30)                   Narrative:    CMS MANDATED QUALITY DATA - CT RADIATION - 436    All CT scans at this facility utilize dose modulation, iterative reconstruction, and/or weight based dosing when appropriate to reduce radiation dose to as low as reasonably achievable.        Reason: Upper Abdominal pain , lung cancer, right thoracotomy    TECHNIQUE: CT thorax, abdomen, and pelvis with 100 mL Omnipaque 350.    COMPARISON: CTA chest dated 12/4/2023 PET/CT dated 8/15/2023    CT THORAX:  Base of the neck is unremarkable. There are stable tiny thyroid nodules. There is no supraclavicular adenopathy.  There is a right IJ Port-A-Cath with the tip in the superior vena cava.    LUNGS: Progression of the alveolar consolidation in the right mid and lower lobe with moderate size right pleural effusion. There is progression of the groundglass and interstitial opacities in the right upper lobe.  Patchy groundglass opacities in the lateral left upper lobe and posterior left lower lobe.  There are mild emphysematous changes.    Heart is normal in size. There is multivessel coronary artery calcification. Aorta is normal in caliber.  Mediastinum: There is no pathologic adenopathy. There are mildly prominent mediastinal lymph nodes  which may be reactive.    There are bilateral rim calcified breast implants. The musculature is normal.  There are degenerative changes of the spine. There are no lytic or blastic lesions.    CT ABDOMEN:  Liver is normal in size and contour. The liver is hypodense compatible with fatty infiltration.  There is a stable 13 mm low-density lesion within the anterior segment of the right lobe of the liver. There is a 4 cm cyst within the posterior segment of the right lobe of the liver.  There is no biliary duct dilatation. The gallbladder is absent.    Spleen is normal in size and contour    Pancreas demonstrate normal enhancement. There is no focal mass.    There are no thick-walled or dilated bowel loops. There is colonic diverticulosis without diverticulitis.  There is no mesenteric or retroperitoneal adenopathy.  There is a stable 3.1 cm rim calcified mass in the right upper quadrant  The aorta is normal in caliber. There is moderate vascular calcification.    There is no free fluid or free air.  The musculature is normal.  There is rightward curvature of the lumbar spine. There are degenerative changes of the spine without lytic or blastic lesions.    CT PELVIS:  The bladder is normal. There is colonic diverticulosis. There is no pelvic adenopathy. There is hardware within the right hip.    IMPRESSION:  Progression of the alveolar consolidation in the right mid and lower lung with moderate size right pleural effusion. There is progression of the airspace disease within the right upper lobe with patchy groundglass opacity left upper lobe and left lung base. Findings are compatible with multifocal.    Moderate vascular calcification of the coronary arteries and aorta    Fatty infiltration of the liver with stable hypodense lesions within the liver compatible with cysts    Colonic diverticulosis without diverticulitis    Prior cholecystectomy and hysterectomy    Stable rim calcified mass in the right upper  quadrant    Degenerative changes of the spine    Electronically signed by:  Chichi Gann MD  12/10/2023 01:29 PM CST Workstation: [id number]                                     X-Ray Chest AP Portable (Final result)  Result time 12/10/23 11:54:03      Final result by Chichi Gann MD (12/10/23 11:54:03)                   Narrative:    Portable chest x-ray 11:06 AM is compared to prior study dated 12/6/2023    Clinical history is cough and shortness of breath    There is a right IJ Port-A-Cath in stable position.  The cardiomediastinal silhouette is stable.  There is progression of the alveolar consolidation in the right mid and lower lung with small right pleural effusion. There are stable interstitial and groundglass opacity in the right upper lobe.  The left lung is clear.  There are bilateral breast implants. There are no acute osseous abnormalities.    IMPRESSION: Progression of the alveolar consolidation in the right mid and lower lung with small right pleural effusion.  There are groundglass and interstitial opacities in the right upper lobe    Electronically signed by:  Chichi Gann MD  12/10/2023 11:54 AM CST Workstation: [id number]                                     Medications   levoFLOXacin 750 mg/150 mL IVPB 750 mg (has no administration in time range)   cefepime 1g in dextrose 5% 100 mL IVPB (ready to mix) (has no administration in time range)   vancomycin - pharmacy to dose (has no administration in time range)   iohexoL (OMNIPAQUE 350) injection 100 mL (100 mLs Intravenous Given 12/10/23 1306)     Medical Decision Making  Amount and/or Complexity of Data Reviewed  Labs: ordered.  Radiology: ordered.    Risk  Prescription drug management.              Attending Attestation:             Attending ED Notes:   ED course and MDM:  This 83-year-old female who has had a prior history of lung cancer and who has had recent hospitalizations for pneumonia and fusion that require drainage, presents  again with complaints of nausea and cough.  No history any recent fever.  The ED course showed a infiltrate in the right base along with a small effusion and the patient had a CT scan showing a multifocal pneumonia with a moderate effusion.  Patient had no oxygen desaturation has a normal white count of 6.88.  The H&H is 10 and 31.  Lactic acid and blood cultures obtained.  The patient was empirically started on antibiotics and hospital Medicine consulted.                             Clinical Impression:  Final diagnoses:  [R06.02] SOB (shortness of breath)  [R05.9] Cough, unspecified type (Primary)  [J18.9] Multifocal pneumonia          ED Disposition Condition    Observation Stable                Jasen Young Jr., MD  12/10/23 [id number]

## 2023-12-10 NOTE — H&P
[facility] - Emergency Dept  Hospital Medicine  History & Physical    Patient Name: Alexa Otero  MRN: [id number]  Patient Class: OP- Observation  Admission Date: 12/10/2023  Attending Physician: Garrett Jones MD   Primary Care Provider: Idalia Greco MD         Patient information was obtained from patient, past medical records, and ER records.     Subjective:     Principal Problem:Pneumonia    Chief Complaint:   Chief Complaint   Patient presents with    Nausea     FEELING THE NEED TO CONSTANTLY BURP    Cough     Dx with pneumonia recently    Shortness of Breath     With exertion        HPI: Patient is an 83-year-old female with history of CAD status post PCI, VFib arrest, type 2 diabetes, hyperlipidemia, hypertension, right lobe adenocarcinoma status post lobectomy who presents with epigastric discomfort.  Patient says this began about 3 days ago and have been worsening.  Patient denies any abdominal pain but says it is very uncomfortable.  Has had associated nausea but no vomiting.  Patient says she is constantly burping.  Denies any constipation or diarrhea.  Patient was recently treated for multifocal pneumonia and pleural effusion and discharged home on December 6 with cefdinir and azithromycin.  Patient says she never took any of the antibiotics at home as she says her  did not pick them up.  Patient does have shortness of breath with exertion.  Denies any fevers, chills, or cough.  In ED, patient was 94% on room air.  WBC 6.8 and hemoglobin 10.0.  Creatinine 1.0.  .    Chest x-ray  Progression of the alveolar consolidation in the right mid and lower lung with small right pleural effusion.  There are groundglass and interstitial opacities in the right upper lobe    CT chest abdomen pelvis   Progression of the alveolar consolidation in the right mid and lower lung with moderate size right pleural effusion. There is progression of the airspace disease within the right upper  lobe with patchy groundglass opacity left upper lobe and left lung base. Findings are compatible with multifocal.  Moderate vascular calcification of the coronary arteries and aorta  Fatty infiltration of the liver with stable hypodense lesions within the liver compatible with cysts  Colonic diverticulosis without diverticulitis  Prior cholecystectomy and hysterectomy  Stable rim calcified mass in the right upper quadrant  Degenerative changes of the spine    Past Medical History:   Diagnosis Date    Arthritis     Cardiac arrest 10/2023    Cataract     Colon polyp     Diabetes mellitus type II 2012    Encounter for blood transfusion     Extra-ovarian endometrioid adenocarcinoma 2020    GERD (gastroesophageal reflux disease)     History of chronic cough     clear productive    Hyperlipidemia     Hypertension     Macular degeneration     Ovarian cancer     PONV (postoperative nausea and vomiting)     Squamous cell carcinoma 1980's    precancer of cervix       Past Surgical History:   Procedure Laterality Date    AUGMENTATION OF BREAST      BRONCHOSCOPY WITH FLUOROSCOPY Right 05/11/2023    Procedure: BRONCHOSCOPY, WITH FLUOROSCOPY;  Surgeon: Neva Christian MD;  Location: [facility];  Service: Pulmonary;  Laterality: Right;    CATARACT EXTRACTION BILATERAL W/ ANTERIOR VITRECTOMY Bilateral     CHOLECYSTECTOMY      COLONOSCOPY      COLONOSCOPY N/A 04/20/2023    Procedure: COLONOSCOPY;  Surgeon: Sabino Stephenson MD;  Location: [facility];  Service: Endoscopy;  Laterality: N/A;    CORONARY STENT PLACEMENT  10/2023    x 3    ESOPHAGOGASTRODUODENOSCOPY N/A 06/20/2018    Procedure: EGD (ESOPHAGOGASTRODUODENOSCOPY);  Surgeon: Sabino Stephenson MD;  Location: [facility];  Service: Endoscopy;  Laterality: N/A;    ESOPHAGOGASTRODUODENOSCOPY N/A 03/31/2023    Procedure: EGD (ESOPHAGOGASTRODUODENOSCOPY);  Surgeon: Sabino Stephenson MD;  Location: [facility];  Service: Endoscopy;  Laterality: N/A;    HYSTERECTOMY  1976    partial     INJECTION OF ANESTHETIC AGENT AROUND MEDIAL BRANCH NERVES INNERVATING LUMBAR FACET JOINT Right 05/10/2023    Procedure: Block-nerve-Lateral-branch-lumbar;  Surgeon: Agustin Robles MD;  Location: [facility] OR;  Service: Pain Management;  Laterality: Right;  L5 and s1,s2 LBB    INJECTION OF ANESTHETIC AGENT AROUND MEDIAL BRANCH NERVES INNERVATING LUMBAR FACET JOINT Right 05/30/2023    Procedure: Block-nerve-medial branch-lumbar;  Surgeon: Agustin Robles MD;  Location: [facility] OR;  Service: Pain Management;  Laterality: Right;  L5, s1 ,s2 LBB #2    INJECTION OF ANESTHETIC AGENT AROUND NERVE Right 10/31/2023    Procedure: INTERCOSTAL NERVE BLOCK;  Surgeon: Washington Shankar MD;  Location: [facility] OR;  Service: Cardiothoracic;  Laterality: Right;    INJECTION, SACROILIAC JOINT Right 01/26/2023    Procedure: INJECTION,SACROILIAC JOINT;  Surgeon: Agustin Robles MD;  Location: [facility] OR;  Service: Pain Management;  Laterality: Right;    INSERTION OF TUNNELED CENTRAL VENOUS CATHETER (CVC) WITH SUBCUTANEOUS PORT Right 10/19/2020    Procedure: INSERTION, PORT-A-CATH;  Surgeon: Misti Mcfarland MD;  Location: [facility] OR;  Service: General;  Laterality: Right;    INTRAMEDULLARY RODDING OF TROCHANTER OF FEMUR Right 11/28/2021    Procedure: INSERTION, INTRAMEDULLARY LUC, FEMUR, TROCHANTER/RIGHT TFN DR FAJARDO NOTIFIED REP;  Surgeon: Kp Fajardo MD;  Location: [facility];  Service: Orthopedics;  Laterality: Right;  SKIP    ROBOT-ASSISTED LAPAROSCOPIC LYMPHADENECTOMY USING DA NELLA XI N/A 09/21/2020    Procedure: XI ROBOTIC LYMPHADENECTOMY-pelvic and kell-aortic;  Surgeon: Altagracia Ray MD;  Location: [facility] OR;  Service: OB/GYN;  Laterality: N/A;    ROBOT-ASSISTED LAPAROSCOPIC OMENTECTOMY USING DA NELLA XI N/A 09/21/2020    Procedure: XI ROBOTIC OMENTECTOMY;  Surgeon: Altagracia Ray MD;  Location: [facility] OR;  Service: OB/GYN;  Laterality: N/A;    ROBOT-ASSISTED LAPAROSCOPIC SALPINGO-OOPHORECTOMY USING DA NELLA XI Bilateral 09/21/2020     Procedure: XI ROBOTIC SALPINGO-OOPHORECTOMY;  Surgeon: Altagracia Ray MD;  Location: [facility] OR;  Service: OB/GYN;  Laterality: Bilateral;    THORACOSCOPIC BIOPSY OF PLEURA Right 10/31/2023    Procedure: VATS, WITH PLEURA BIOPSY;  Surgeon: Washington Shankar MD;  Location: [facility] OR;  Service: Cardiothoracic;  Laterality: Right;  FROZEN SECTION   **KRISTEN-ASSISDT**    THORACOTOMY Right 10/31/2023    Procedure: THORACOTOMY;  Surgeon: Washington Shankar MD;  Location: [facility] OR;  Service: Cardiothoracic;  Laterality: Right;    UPPER GASTROINTESTINAL ENDOSCOPY         Review of patient's allergies indicates:  No Known Allergies    No current facility-administered medications on file prior to encounter.     Current Outpatient Medications on File Prior to Encounter   Medication Sig    albuterol (VENTOLIN HFA) 90 mcg/actuation inhaler Inhale 2 puffs into the lungs every 4 (four) hours as needed for Wheezing or Shortness of Breath. Rescue    amiodarone (PACERONE) 200 MG Tab Take 1 tablet (200 mg total) by mouth 2 (two) times daily.    aspirin (ECOTRIN) 81 MG EC tablet Take 81 mg by mouth once daily.    azithromycin (Z-DEJAN) 250 MG tablet Take 500 mg by mouth once.    cefdinir (OMNICEF) 300 MG capsule Take 1 capsule (300 mg total) by mouth 2 (two) times daily. for 5 days    clopidogreL (PLAVIX) 75 mg tablet Take 75 mg by mouth once daily.    famotidine (PEPCID) 20 MG tablet Take 1 tablet (20 mg total) by mouth once daily. for 30 doses    gabapentin (NEURONTIN) 100 MG capsule TAKE 1 CAPSULE(100 MG) BY MOUTH TWICE DAILY (Patient taking differently: Take 100 mg by mouth 2 (two) times daily.)    HYDROcodone-acetaminophen (NORCO) 5-325 mg per tablet Take 1 tablet by mouth every 6 (six) hours as needed for Pain.    magnesium oxide (MAG-OX) 400 mg (241.3 mg magnesium) tablet Take 400 mg by mouth once daily.    melatonin 5 mg Chew Take 2 tablets by mouth nightly as needed (insomnia).    metFORMIN (GLUCOPHAGE) 500 MG tablet TAKE 1  TABLET(500 MG) BY MOUTH TWICE DAILY WITH MEALS (Patient taking differently: Take 500 mg by mouth 2 (two) times a day.)    metoprolol succinate (TOPROL-XL) 25 MG 24 hr tablet Take 25 mg by mouth once daily.    neomycin-polymyxin-gramicidin (NEOSPORIN) ophthalmic solution Place 1 drop into both eyes 4 (four) times daily.    potassium chloride (K-TAB) 20 mEq Take 1 tablet by mouth once daily.    VIT A/VIT C/VIT E/ZINC/COPPER (ICAPS AREDS ORAL) Take 1 capsule by mouth 2 (two) times a day.      Family History       Problem Relation (Age of Onset)    Breast cancer Maternal Grandmother, Paternal Grandmother    Cancer Mother (57), Father (56)    Colon polyps Daughter    Melanoma Brother    Skin cancer Sister, Brother, Brother          Tobacco Use    Smoking status: Former     Current packs/day: 0.00     Average packs/day: 1 pack/day for 20.0 years (20.0 ttl pk-yrs)     Types: Cigarettes     Start date:      Quit date:      Years since quittin.9    Smokeless tobacco: Never    Tobacco comments:     quit 40 yrs ago   Substance and Sexual Activity    Alcohol use: Yes     Alcohol/week: 1.0 standard drink of alcohol     Types: 1 Glasses of wine per week     Comment: occasional    Drug use: No    Sexual activity: Not Currently     Partners: Male     Review of Systems   Constitutional: Negative.    HENT: Negative.     Eyes: Negative.    Respiratory:  Positive for shortness of breath. Negative for wheezing and stridor.    Cardiovascular: Negative.    Gastrointestinal:  Positive for nausea. Negative for constipation, diarrhea and vomiting.        Epigastric discomfort   Endocrine: Negative.    Genitourinary: Negative.    Musculoskeletal: Negative.    Skin: Negative.    Allergic/Immunologic: Positive for immunocompromised state.   Neurological: Negative.    Hematological: Negative.    Psychiatric/Behavioral: Negative.       Objective:     Vital Signs (Most Recent):  Temp: 97.8 °F (36.6 °C) (12/10/23 1020)  Pulse: 92  (12/10/23 1400)  Resp: (!) 29 (12/10/23 1400)  BP: (!) 171/80 (12/10/23 1400)  SpO2: 98 % (12/10/23 1400) Vital Signs (24h Range):  Temp:  [97.8 °F (36.6 °C)] 97.8 °F (36.6 °C)  Pulse:  [82-97] 92  Resp:  [18-29] 29  SpO2:  [94 %-98 %] 98 %  BP: (134-171)/(63-87) 171/80     Weight: 52.2 kg (115 lb)  Body mass index is 20.37 kg/m².     Physical Exam  Vitals and nursing note reviewed.   Constitutional:       General: She is not in acute distress.     Appearance: Normal appearance.   HENT:      Head: Normocephalic and atraumatic.      Nose: Nose normal.   Eyes:      Extraocular Movements: Extraocular movements intact.      Conjunctiva/sclera: Conjunctivae normal.   Cardiovascular:      Rate and Rhythm: Normal rate and regular rhythm.   Pulmonary:      Effort: Pulmonary effort is normal. No respiratory distress.      Breath sounds: No stridor. Rales present. No wheezing.   Abdominal:      General: Bowel sounds are normal. There is no distension.      Palpations: Abdomen is soft.      Tenderness: There is no abdominal tenderness.   Musculoskeletal:         General: No swelling or tenderness. Normal range of motion.      Cervical back: Normal range of motion and neck supple.      Right lower leg: No edema.      Left lower leg: No edema.   Skin:     General: Skin is warm and dry.   Neurological:      General: No focal deficit present.      Mental Status: She is alert and oriented to person, place, and time.                Significant Labs: All pertinent labs within the past 24 hours have been reviewed.  Recent Lab Results         12/10/23  1105        Albumin 3.0       ALP 40       ALT 25       Anion Gap 7       AST 16       Baso # 0.03       Basophil % 0.4       BILIRUBIN TOTAL 0.3  Comment: For infants and newborns, interpretation of results should be based  on gestational age, weight and in agreement with clinical  observations.    Premature Infant recommended reference ranges:  Up to 24 hours.............<8.0 mg/dL  Up  to 48 hours............<12.0 mg/dL  3-5 days..................<15.0 mg/dL  6-29 days.................<15.0 mg/dL           Comment: Values of less than 100 pg/ml are consistent with non-CHF populations.       BUN 26       Calcium 8.7       Chloride 104       CO2 27       Creatinine 1.0       Differential Method Automated       eGFR 56.2       Eos # 0.2       Eosinophil % 2.5       Glucose 183       Gran # (ANC) 5.4       Gran % 78.1       Hematocrit 31.4       Hemoglobin 10.0       Immature Grans (Abs) 0.05  Comment: Mild elevation in immature granulocytes is non specific and   can be seen in a variety of conditions including stress response,   acute inflammation, trauma and pregnancy. Correlation with other   laboratory and clinical findings is essential.         Immature Granulocytes 0.7       Lipase 34       Lymph # 0.7       Lymph % 10.5       Magnesium  1.7       MCH 28.5       MCHC 31.8       MCV 90       Mono # 0.5       Mono % 7.8       MPV 9.0       nRBC 0       Platelet Estimate Increased       Platelet Count 497  Comment: Delta check review       Potassium 3.7       PROTEIN TOTAL 6.2       RBC 3.51       RDW 14.8       Sodium 138       Troponin I High Sensitivity 9.7  Comment: Troponin results differ between methods. Do not use   results between Troponin methods interchangeably.    Alkaline Phospatase levels above 400 U/L may   cause false positive results.    Access hsTnI should not be used for patients taking   Asfotase lizzeth (Strensiq).         WBC 6.88               Significant Imaging: I have reviewed all pertinent imaging results/findings within the past 24 hours.  Assessment/Plan:     * Pneumonia  Patient was recently treated for multifocal pneumonia and pleural effusion status post thoracentesis.  Patient was discharged home on December 6 with cefdinir and azithromycin.  Patient was unable to  the antibiotics from pharmacy.  CT shows progression of airspace disease and moderate right  pleural effusion.  Repeat blood cultures.  Continue ceftriaxone and azithromycin.  Albuterol p.r.n..  Pulmonology consulted.        Epigastric discomfort  Start IV ppi.  Zofran p.r.n. for nausea.  NPO midnight for GI evaluation.      Malignant neoplasm of lower lobe of right lung  Follows with Dr. Lloyd outpatient, status post right lower lobe lobectomy.  Has not started chemotherapy yet.     Cardiopulmonary arrest  History of VFib arrest on 10/13/2023, currently on amiodarone  On dual antiplatelet therapy for CAD.      VTE Risk Mitigation (From admission, onward)           Ordered     enoxaparin injection 40 mg  Daily         12/10/23 1403     IP VTE HIGH RISK PATIENT  Once         12/10/23 1403     Place sequential compression device  Until discontinued         12/10/23 1403                       On 12/10/2023, patient should be placed in hospital observation services under my care.             Garrett Jones MD  Department of Hospital Medicine  [facility] - Emergency Dept

## 2023-12-11 ENCOUNTER — ANESTHESIA EVENT (OUTPATIENT)
Dept: SURGERY | Facility: HOSPITAL | Age: 83
DRG: 392 | End: 2023-12-11
Payer: MEDICARE

## 2023-12-11 ENCOUNTER — ANESTHESIA (OUTPATIENT)
Dept: SURGERY | Facility: HOSPITAL | Age: 83
DRG: 392 | End: 2023-12-11
Payer: MEDICARE

## 2023-12-11 LAB
ALBUMIN SERPL BCP-MCNC: 2.8 G/DL (ref 3.5–5.2)
ALP SERPL-CCNC: 36 U/L (ref 55–135)
ALT SERPL W/O P-5'-P-CCNC: 19 U/L (ref 10–44)
ANION GAP SERPL CALC-SCNC: 6 MMOL/L (ref 8–16)
AST SERPL-CCNC: 11 U/L (ref 10–40)
BASOPHILS # BLD AUTO: 0.05 K/UL (ref 0–0.2)
BASOPHILS NFR BLD: 0.8 % (ref 0–1.9)
BILIRUB SERPL-MCNC: 0.2 MG/DL (ref 0.1–1)
BUN SERPL-MCNC: 20 MG/DL (ref 8–23)
CALCIUM SERPL-MCNC: 8.4 MG/DL (ref 8.7–10.5)
CHLORIDE SERPL-SCNC: 105 MMOL/L (ref 95–110)
CO2 SERPL-SCNC: 28 MMOL/L (ref 23–29)
CREAT SERPL-MCNC: 0.9 MG/DL (ref 0.5–1.4)
CRP SERPL-MCNC: 39.3 MG/L (ref 0–8.2)
CRP SERPL-MCNC: 65.7 MG/L (ref 0–8.2)
DIFFERENTIAL METHOD BLD: ABNORMAL
EOSINOPHIL # BLD AUTO: 0.3 K/UL (ref 0–0.5)
EOSINOPHIL NFR BLD: 4.3 % (ref 0–8)
ERYTHROCYTE [DISTWIDTH] IN BLOOD BY AUTOMATED COUNT: 14.7 % (ref 11.5–14.5)
EST. GFR  (NO RACE VARIABLE): >60 ML/MIN/1.73 M^2
FERRITIN SERPL-MCNC: 134.1 NG/ML (ref 20–300)
GLUCOSE SERPL-MCNC: 106 MG/DL (ref 70–110)
HCT VFR BLD AUTO: 27.9 % (ref 37–48.5)
HGB BLD-MCNC: 8.8 G/DL (ref 12–16)
IMM GRANULOCYTES # BLD AUTO: 0.06 K/UL (ref 0–0.04)
IMM GRANULOCYTES NFR BLD AUTO: 0.9 % (ref 0–0.5)
IRON SERPL-MCNC: 20 UG/DL (ref 30–160)
LYMPHOCYTES # BLD AUTO: 1.1 K/UL (ref 1–4.8)
LYMPHOCYTES NFR BLD: 16.3 % (ref 18–48)
MCH RBC QN AUTO: 28.5 PG (ref 27–31)
MCHC RBC AUTO-ENTMCNC: 31.5 G/DL (ref 32–36)
MCV RBC AUTO: 90 FL (ref 82–98)
MONOCYTES # BLD AUTO: 0.6 K/UL (ref 0.3–1)
MONOCYTES NFR BLD: 9.6 % (ref 4–15)
NEUTROPHILS # BLD AUTO: 4.4 K/UL (ref 1.8–7.7)
NEUTROPHILS NFR BLD: 68.1 % (ref 38–73)
NRBC BLD-RTO: 0 /100 WBC
PLATELET # BLD AUTO: 462 K/UL (ref 150–450)
PMV BLD AUTO: 8.8 FL (ref 9.2–12.9)
POTASSIUM SERPL-SCNC: 3.6 MMOL/L (ref 3.5–5.1)
PROT SERPL-MCNC: 5.7 G/DL (ref 6–8.4)
RBC # BLD AUTO: 3.09 M/UL (ref 4–5.4)
SATURATED IRON: 11 % (ref 20–50)
SODIUM SERPL-SCNC: 139 MMOL/L (ref 136–145)
TOTAL IRON BINDING CAPACITY: 186 UG/DL (ref 250–450)
TRANSFERRIN SERPL-MCNC: 133 MG/DL (ref 200–375)
WBC # BLD AUTO: 6.44 K/UL (ref 3.9–12.7)

## 2023-12-11 PROCEDURE — 96367 TX/PROPH/DG ADDL SEQ IV INF: CPT | Mod: 59

## 2023-12-11 PROCEDURE — A4216 STERILE WATER/SALINE, 10 ML: HCPCS | Performed by: STUDENT IN AN ORGANIZED HEALTH CARE EDUCATION/TRAINING PROGRAM

## 2023-12-11 PROCEDURE — 0D758ZZ DILATION OF ESOPHAGUS, VIA NATURAL OR ARTIFICIAL OPENING ENDOSCOPIC: ICD-10-PCS | Performed by: INTERNAL MEDICINE

## 2023-12-11 PROCEDURE — G0378 HOSPITAL OBSERVATION PER HR: HCPCS

## 2023-12-11 PROCEDURE — 37000008 HC ANESTHESIA 1ST 15 MINUTES: Performed by: INTERNAL MEDICINE

## 2023-12-11 PROCEDURE — 63600175 PHARM REV CODE 636 W HCPCS: Performed by: STUDENT IN AN ORGANIZED HEALTH CARE EDUCATION/TRAINING PROGRAM

## 2023-12-11 PROCEDURE — 25000003 PHARM REV CODE 250: Performed by: STUDENT IN AN ORGANIZED HEALTH CARE EDUCATION/TRAINING PROGRAM

## 2023-12-11 PROCEDURE — 80053 COMPREHEN METABOLIC PANEL: CPT | Performed by: STUDENT IN AN ORGANIZED HEALTH CARE EDUCATION/TRAINING PROGRAM

## 2023-12-11 PROCEDURE — 63600175 PHARM REV CODE 636 W HCPCS: Performed by: NURSE ANESTHETIST, CERTIFIED REGISTERED

## 2023-12-11 PROCEDURE — D9220A PRA ANESTHESIA: Mod: CRNA,,, | Performed by: NURSE ANESTHETIST, CERTIFIED REGISTERED

## 2023-12-11 PROCEDURE — D9220A PRA ANESTHESIA: ICD-10-PCS | Mod: ANES,,, | Performed by: ANESTHESIOLOGY

## 2023-12-11 PROCEDURE — 88305 TISSUE EXAM BY PATHOLOGIST: CPT | Mod: TC | Performed by: PATHOLOGY

## 2023-12-11 PROCEDURE — 99900031 HC PATIENT EDUCATION (STAT)

## 2023-12-11 PROCEDURE — 82728 ASSAY OF FERRITIN: CPT | Performed by: STUDENT IN AN ORGANIZED HEALTH CARE EDUCATION/TRAINING PROGRAM

## 2023-12-11 PROCEDURE — 83540 ASSAY OF IRON: CPT | Performed by: STUDENT IN AN ORGANIZED HEALTH CARE EDUCATION/TRAINING PROGRAM

## 2023-12-11 PROCEDURE — 87338 HPYLORI STOOL AG IA: CPT | Performed by: STUDENT IN AN ORGANIZED HEALTH CARE EDUCATION/TRAINING PROGRAM

## 2023-12-11 PROCEDURE — D9220A PRA ANESTHESIA: Mod: ANES,,, | Performed by: ANESTHESIOLOGY

## 2023-12-11 PROCEDURE — 36415 COLL VENOUS BLD VENIPUNCTURE: CPT | Performed by: STUDENT IN AN ORGANIZED HEALTH CARE EDUCATION/TRAINING PROGRAM

## 2023-12-11 PROCEDURE — D9220A PRA ANESTHESIA: ICD-10-PCS | Mod: CRNA,,, | Performed by: NURSE ANESTHETIST, CERTIFIED REGISTERED

## 2023-12-11 PROCEDURE — 87070 CULTURE OTHR SPECIMN AEROBIC: CPT | Performed by: STUDENT IN AN ORGANIZED HEALTH CARE EDUCATION/TRAINING PROGRAM

## 2023-12-11 PROCEDURE — 0DB68ZX EXCISION OF STOMACH, VIA NATURAL OR ARTIFICIAL OPENING ENDOSCOPIC, DIAGNOSTIC: ICD-10-PCS | Performed by: INTERNAL MEDICINE

## 2023-12-11 PROCEDURE — 99900035 HC TECH TIME PER 15 MIN (STAT)

## 2023-12-11 PROCEDURE — 86140 C-REACTIVE PROTEIN: CPT | Performed by: STUDENT IN AN ORGANIZED HEALTH CARE EDUCATION/TRAINING PROGRAM

## 2023-12-11 PROCEDURE — 96372 THER/PROPH/DIAG INJ SC/IM: CPT | Performed by: STUDENT IN AN ORGANIZED HEALTH CARE EDUCATION/TRAINING PROGRAM

## 2023-12-11 PROCEDURE — 85025 COMPLETE CBC W/AUTO DIFF WBC: CPT | Performed by: STUDENT IN AN ORGANIZED HEALTH CARE EDUCATION/TRAINING PROGRAM

## 2023-12-11 PROCEDURE — 94760 N-INVAS EAR/PLS OXIMETRY 1: CPT

## 2023-12-11 PROCEDURE — 37000009 HC ANESTHESIA EA ADD 15 MINS: Performed by: INTERNAL MEDICINE

## 2023-12-11 PROCEDURE — 25000003 PHARM REV CODE 250: Performed by: INTERNAL MEDICINE

## 2023-12-11 PROCEDURE — 27200043 HC FORCEPS, BIOPSY: Performed by: INTERNAL MEDICINE

## 2023-12-11 PROCEDURE — 27000221 HC OXYGEN, UP TO 24 HOURS

## 2023-12-11 PROCEDURE — 43239 EGD BIOPSY SINGLE/MULTIPLE: CPT | Performed by: INTERNAL MEDICINE

## 2023-12-11 PROCEDURE — 25000003 PHARM REV CODE 250: Performed by: NURSE ANESTHETIST, CERTIFIED REGISTERED

## 2023-12-11 PROCEDURE — 43450 DILATE ESOPHAGUS 1/MULT PASS: CPT | Performed by: INTERNAL MEDICINE

## 2023-12-11 PROCEDURE — 87205 SMEAR GRAM STAIN: CPT | Performed by: STUDENT IN AN ORGANIZED HEALTH CARE EDUCATION/TRAINING PROGRAM

## 2023-12-11 RX ORDER — LIDOCAINE HYDROCHLORIDE 20 MG/ML
INJECTION INTRAVENOUS
Status: DISCONTINUED | OUTPATIENT
Start: 2023-12-11 | End: 2023-12-11

## 2023-12-11 RX ORDER — SODIUM CHLORIDE, SODIUM LACTATE, POTASSIUM CHLORIDE, CALCIUM CHLORIDE 600; 310; 30; 20 MG/100ML; MG/100ML; MG/100ML; MG/100ML
INJECTION, SOLUTION INTRAVENOUS CONTINUOUS PRN
Status: DISCONTINUED | OUTPATIENT
Start: 2023-12-11 | End: 2023-12-11

## 2023-12-11 RX ORDER — PROPOFOL 10 MG/ML
INJECTION, EMULSION INTRAVENOUS
Status: DISCONTINUED | OUTPATIENT
Start: 2023-12-11 | End: 2023-12-11

## 2023-12-11 RX ORDER — SIMETHICONE 80 MG
1 TABLET,CHEWABLE ORAL
Status: DISCONTINUED | OUTPATIENT
Start: 2023-12-11 | End: 2023-12-13 | Stop reason: HOSPADM

## 2023-12-11 RX ORDER — FAMOTIDINE 20 MG/1
20 TABLET, FILM COATED ORAL DAILY
Status: DISCONTINUED | OUTPATIENT
Start: 2023-12-11 | End: 2023-12-13 | Stop reason: HOSPADM

## 2023-12-11 RX ADMIN — SODIUM CHLORIDE, SODIUM LACTATE, POTASSIUM CHLORIDE, AND CALCIUM CHLORIDE: .6; .31; .03; .02 INJECTION, SOLUTION INTRAVENOUS at 01:12

## 2023-12-11 RX ADMIN — ENOXAPARIN SODIUM 40 MG: 40 INJECTION SUBCUTANEOUS at 06:12

## 2023-12-11 RX ADMIN — ACETAMINOPHEN 650 MG: 325 TABLET ORAL at 11:12

## 2023-12-11 RX ADMIN — AMIODARONE HYDROCHLORIDE 200 MG: 200 TABLET ORAL at 08:12

## 2023-12-11 RX ADMIN — SODIUM CHLORIDE, PRESERVATIVE FREE 10 ML: 5 INJECTION INTRAVENOUS at 09:12

## 2023-12-11 RX ADMIN — Medication 6 MG: at 10:12

## 2023-12-11 RX ADMIN — FAMOTIDINE 20 MG: 20 TABLET ORAL at 04:12

## 2023-12-11 RX ADMIN — GABAPENTIN 100 MG: 100 CAPSULE ORAL at 08:12

## 2023-12-11 RX ADMIN — CEFTRIAXONE SODIUM 1 G: 1 INJECTION, POWDER, FOR SOLUTION INTRAMUSCULAR; INTRAVENOUS at 06:12

## 2023-12-11 RX ADMIN — PROPOFOL 50 MG: 10 INJECTION, EMULSION INTRAVENOUS at 01:12

## 2023-12-11 RX ADMIN — SIMETHICONE 80 MG: 80 TABLET, CHEWABLE ORAL at 07:12

## 2023-12-11 RX ADMIN — AZITHROMYCIN DIHYDRATE 500 MG: 500 INJECTION, POWDER, LYOPHILIZED, FOR SOLUTION INTRAVENOUS at 08:12

## 2023-12-11 RX ADMIN — GABAPENTIN 100 MG: 100 CAPSULE ORAL at 10:12

## 2023-12-11 RX ADMIN — METOPROLOL SUCCINATE 25 MG: 25 TABLET, EXTENDED RELEASE ORAL at 08:12

## 2023-12-11 RX ADMIN — LIDOCAINE HYDROCHLORIDE 20 MG: 20 INJECTION, SOLUTION INTRAVENOUS at 01:12

## 2023-12-11 NOTE — PLAN OF CARE
12/11/23 0811   Patient Assessment/Suction   Level of Consciousness (AVPU) alert   Respiratory Effort Normal;Unlabored   Expansion/Accessory Muscles/Retractions no use of accessory muscles   All Lung Fields Breath Sounds diminished   Rhythm/Pattern, Respiratory pattern regular   PRE-TX-O2   Device (Oxygen Therapy) room air   SpO2 (!) 92 %   Pulse Oximetry Type Intermittent   $ Pulse Oximetry - Single Charge Pulse Oximetry - Single   Pulse 82   Resp 18   Aerosol Therapy   $ Aerosol Therapy Charges PRN treatment not required   Education   $ Education 15 min  (SpO2)

## 2023-12-11 NOTE — CONSULTS
GASTROENTEROLOGY INPATIENT CONSULT NOTE  Patient Name: Alexa Otero  Patient MRN: [id number]  Patient : 1940    Admit Date: 12/10/2023  Service date: 2023    Reason for Consult:  Anemia epigastric pain    PCP: Idalia Greco MD    Chief Complaint   Patient presents with    Nausea     FEELING THE NEED TO CONSTANTLY BURP    Cough     Dx with pneumonia recently    Shortness of Breath     With exertion       HPI: Patient is a  Patient is an 83-year-old female with history of CAD status post PCI, VFib arrest, type 2 diabetes, hyperlipidemia, hypertension, right lobe adenocarcinoma status post lobectomy who presents with epigastric discomfort.  Patient says this began about 3 days ago and have been worsening.  Patient denies any abdominal pain but says it is very uncomfortable.  Has had associated nausea but no vomiting.  Patient says she is constantly burping.  Denies any constipation or diarrhea.  Patient was recently treated for multifocal pneumonia and pleural effusion and discharged home on  with cefdinir and azithromycin.  Patient says she never took any of the antibiotics at home as she says her  did not pick them up.  Patient does have shortness of breath with exertion.  Denies any fevers, chills, or cough.  In ED, patient was 94% on room air.  WBC 6.8 and hemoglobin 10.0.  Creatinine 1.0.  .     Chest x-ray  Progression of the alveolar consolidation in the right mid and lower lung with small right pleural effusion.  There are groundglass and interstitial opacities in the right upper lobe     CT chest abdomen pelvis   Progression of the alveolar consolidation in the right mid and lower lung with moderate size right pleural effusion. There is progression of the airspace disease within the right upper lobe with patchy groundglass opacity left upper lobe and left lung base. Findings are compatible with multifocal.  Moderate vascular calcification of the coronary arteries and  aorta  Fatty infiltration of the liver with stable hypodense lesions within the liver compatible with cysts  Colonic diverticulosis without diverticulitis  Prior cholecystectomy and hysterectomy  Stable rim calcified mass in the right upper quadrant  Degenerative changes of the spine    Patient reports sensation of anorexia nausea she is had some slow steady weight loss over the year.  She denies any melena or hematochezia.  She denies any esophageal dysphagia.  She simply feels like the sensation of a trapped belch or burp.    Past Medical History:  Past Medical History:   Diagnosis Date    Arthritis     Cardiac arrest 10/2023    Cataract     Colon polyp     Diabetes mellitus type II 2012    Encounter for blood transfusion     Extra-ovarian endometrioid adenocarcinoma 2020    GERD (gastroesophageal reflux disease)     History of chronic cough     clear productive    Hyperlipidemia     Hypertension     Macular degeneration     Ovarian cancer     PONV (postoperative nausea and vomiting)     Squamous cell carcinoma 1980's    precancer of cervix        Past Surgical History:  Past Surgical History:   Procedure Laterality Date    AUGMENTATION OF BREAST      BRONCHOSCOPY WITH FLUOROSCOPY Right 05/11/2023    Procedure: BRONCHOSCOPY, WITH FLUOROSCOPY;  Surgeon: Neva Christian MD;  Location: [facility];  Service: Pulmonary;  Laterality: Right;    CATARACT EXTRACTION BILATERAL W/ ANTERIOR VITRECTOMY Bilateral     CHOLECYSTECTOMY      COLONOSCOPY      COLONOSCOPY N/A 04/20/2023    Procedure: COLONOSCOPY;  Surgeon: Sabino Stephenson MD;  Location: [facility];  Service: Endoscopy;  Laterality: N/A;    CORONARY STENT PLACEMENT  10/2023    x 3    ESOPHAGOGASTRODUODENOSCOPY N/A 06/20/2018    Procedure: EGD (ESOPHAGOGASTRODUODENOSCOPY);  Surgeon: Sabino Stephenson MD;  Location: [facility];  Service: Endoscopy;  Laterality: N/A;    ESOPHAGOGASTRODUODENOSCOPY N/A 03/31/2023    Procedure: EGD (ESOPHAGOGASTRODUODENOSCOPY);  Surgeon:  Sabino Stephenson MD;  Location: [facility];  Service: Endoscopy;  Laterality: N/A;    HYSTERECTOMY  1976    partial    INJECTION OF ANESTHETIC AGENT AROUND MEDIAL BRANCH NERVES INNERVATING LUMBAR FACET JOINT Right 05/10/2023    Procedure: Block-nerve-Lateral-branch-lumbar;  Surgeon: Agustin Robles MD;  Location: [facility] OR;  Service: Pain Management;  Laterality: Right;  L5 and s1,s2 LBB    INJECTION OF ANESTHETIC AGENT AROUND MEDIAL BRANCH NERVES INNERVATING LUMBAR FACET JOINT Right 05/30/2023    Procedure: Block-nerve-medial branch-lumbar;  Surgeon: Agustin Robles MD;  Location: [facility]placed by [facility];  Service: Pain Management;  Laterality: Right;  L5, s1 ,s2 LBB #2    INJECTION OF ANESTHETIC AGENT AROUND NERVE Right 10/31/2023    Procedure: INTERCOSTAL NERVE BLOCK;  Surgeon: Washington Shankar MD;  Location: [facility];  Service: Cardiothoracic;  Laterality: Right;    INJECTION, SACROILIAC JOINT Right 01/26/2023    Procedure: INJECTION,SACROILIAC JOINT;  Surgeon: Agustin Robles MD;  Location: Replaced by [facility];  Service: Pain Management;  Laterality: Right;    INSERTION OF TUNNELED CENTRAL VENOUS CATHETER (CVC) WITH SUBCUTANEOUS PORT Right 10/19/2020    Procedure: INSERTION, PORT-A-CATH;  Surgeon: Misti Mcfarland MD;  Location: [facility];  Service: General;  Laterality: Right;    INTRAMEDULLARY RODDING OF TROCHANTER OF FEMUR Right 11/28/2021    Procedure: INSERTION, INTRAMEDULLARY LUC, FEMUR, TROCHANTER/RIGHT TFN DR FAJARDO NOTIFIED REP;  Surgeon: Kp Fajardo MD;  Location: [facility] OR;  Service: Orthopedics;  Laterality: Right;  SKIP    ROBOT-ASSISTED LAPAROSCOPIC LYMPHADENECTOMY USING DA NELLA XI N/A 09/21/2020    Procedure: XI ROBOTIC LYMPHADENECTOMY-pelvic and kell-aortic;  Surgeon: Altagracia Ray MD;  Location: [facility] OR;  Service: OB/GYN;  Laterality: N/A;    ROBOT-ASSISTED LAPAROSCOPIC OMENTECTOMY USING DA NELLA XI N/A 09/21/2020    Procedure: XI ROBOTIC OMENTECTOMY;  Surgeon: Altagracia Ray MD;  Location: [facility] OR;  Service: OB/GYN;   Laterality: N/A;    ROBOT-ASSISTED LAPAROSCOPIC SALPINGO-OOPHORECTOMY USING DA NELLA XI Bilateral 09/21/2020    Procedure: XI ROBOTIC SALPINGO-OOPHORECTOMY;  Surgeon: Altagracia Ray MD;  Location: [facility];  Service: OB/GYN;  Laterality: Bilateral;    THORACOSCOPIC BIOPSY OF PLEURA Right 10/31/2023    Procedure: VATS, WITH PLEURA BIOPSY;  Surgeon: Washington Shankar MD;  Location: [facility] OR;  Service: Cardiothoracic;  Laterality: Right;  FROZEN SECTION   **KRISTEN-ASSISDT**    THORACOTOMY Right 10/31/2023    Procedure: THORACOTOMY;  Surgeon: Washington Shankar MD;  Location: [facility] OR;  Service: Cardiothoracic;  Laterality: Right;    UPPER GASTROINTESTINAL ENDOSCOPY          Home Medications:  Medications Prior to Admission   Medication Sig Dispense Refill Last Dose    albuterol (VENTOLIN HFA) 90 mcg/actuation inhaler Inhale 2 puffs into the lungs every 4 (four) hours as needed for Wheezing or Shortness of Breath. Rescue 6.7 g 1 Past Week    amiodarone (PACERONE) 200 MG Tab Take 1 tablet (200 mg total) by mouth 2 (two) times daily. 60 tablet 11 12/9/2023    aspirin (ECOTRIN) 81 MG EC tablet Take 81 mg by mouth once daily.   12/9/2023    clopidogreL (PLAVIX) 75 mg tablet Take 75 mg by mouth once daily.   12/9/2023    famotidine (PEPCID) 20 MG tablet Take 1 tablet (20 mg total) by mouth once daily. for 30 doses 30 tablet 0 12/9/2023    gabapentin (NEURONTIN) 100 MG capsule TAKE 1 CAPSULE(100 MG) BY MOUTH TWICE DAILY (Patient taking differently: Take 100 mg by mouth 2 (two) times daily.) 180 capsule 3 12/9/2023    magnesium oxide (MAG-OX) 400 mg (241.3 mg magnesium) tablet Take 400 mg by mouth once daily.   12/9/2023    melatonin 5 mg Chew Take 1 tablet by mouth nightly as needed (insomnia).   12/9/2023    metFORMIN (GLUCOPHAGE) 500 MG tablet TAKE 1 TABLET(500 MG) BY MOUTH TWICE DAILY WITH MEALS (Patient taking differently: Take 500 mg by mouth 2 (two) times a day.) 180 tablet 0 12/9/2023    metoprolol succinate  (TOPROL-XL) 25 MG 24 hr tablet Take 25 mg by mouth once daily.   2023    VIT A/VIT C/VIT E/ZINC/COPPER (ICAPS AREDS ORAL) Take 1 capsule by mouth 2 (two) times a day.    2023    azithromycin (Z-DEJAN) 250 MG tablet Take 500 mg by mouth once.       cefdinir (OMNICEF) 300 MG capsule Take 1 capsule (300 mg total) by mouth 2 (two) times daily. for 5 days (Patient not taking: Reported on 12/10/2023) 10 capsule 0 Not Taking    HYDROcodone-acetaminophen (NORCO) 5-325 mg per tablet Take 1 tablet by mouth every 6 (six) hours as needed for Pain.       potassium chloride (K-TAB) 20 mEq Take 1 tablet by mouth once daily.          Inpatient Medications:   amiodarone  200 mg Oral Daily    aspirin  81 mg Oral Daily    azithromycin  500 mg Intravenous Q24H    cefTRIAXone (ROCEPHIN) IVPB  1 g Intravenous Q24H    clopidogreL  75 mg Oral Daily    enoxparin  40 mg Subcutaneous Daily    gabapentin  100 mg Oral BID    metoprolol succinate  25 mg Oral Daily    pantoprazole  40 mg Intravenous Daily    simethicone  1 tablet Oral TID PC    sodium chloride 0.9%  10 mL Intravenous Q12H     acetaminophen, albuterol sulfate, HYDROcodone-acetaminophen, melatonin, ondansetron    Review of patient's allergies indicates:  No Known Allergies    Social History:   Social History     Occupational History    Not on file   Tobacco Use    Smoking status: Former     Current packs/day: 0.00     Average packs/day: 1 pack/day for 20.0 years (20.0 ttl pk-yrs)     Types: Cigarettes     Start date:      Quit date:      Years since quittin.9    Smokeless tobacco: Never    Tobacco comments:     quit 40 yrs ago   Substance and Sexual Activity    Alcohol use: Yes     Alcohol/week: 1.0 standard drink of alcohol     Types: 1 Glasses of wine per week     Comment: occasional    Drug use: No    Sexual activity: Not Currently     Partners: Male       Family History:   Family History   Problem Relation Age of Onset    Cancer Mother 57        lung     "Cancer Father 56        oral    Skin cancer Sister     Skin cancer Brother     Melanoma Brother     Skin cancer Brother     Breast cancer Maternal Grandmother     Breast cancer Paternal Grandmother     Colon polyps Daughter     Psoriasis Neg Hx     Lupus Neg Hx     Eczema Neg Hx     Colon cancer Neg Hx     Crohn's disease Neg Hx     Esophageal cancer Neg Hx     Stomach cancer Neg Hx     Ulcerative colitis Neg Hx        Review of Systems:  A 10 point review of systems was performed and was normal, except as mentioned in the HPI, including constitutional, HEENT, heme, lymph, cardiovascular, respiratory, gastrointestinal, genitourinary, neurologic, endocrine, psychiatric and musculoskeletal.      OBJECTIVE:    Physical Exam:  24 Hour Vital Sign Ranges: Temp:  [97.6 °F (36.4 °C)-98.6 °F (37 °C)] 98.6 °F (37 °C)  Pulse:  [80-93] 82  Resp:  [16-23] 18  SpO2:  [92 %-98 %] 92 %  BP: (117-172)/(57-86) 129/57  Most recent vitals: BP (!) 129/57   Pulse 82   Temp 98.6 °F (37 °C) (Oral)   Resp 18   Ht 5' 3" (1.6 m)   Wt 56.5 kg (124 lb 9 oz)   SpO2 (!) 92%   BMI 22.06 kg/m²    GEN: well-developed, well-nourished, awake and alert, non-toxic appearing adult  HEENT: PERRL, sclera anicteric, oral mucosa pink and moist without lesion  NECK: trachea midline; Good ROM  CV: regular rate and rhythm, no murmurs or gallops  RESP: clear to auscultation bilaterally, no wheezes, rhonci or rales  ABD: soft, non-tender, non-distended, normal bowel sounds  EXT: no swelling or edema, 2+ pulses distally  SKIN: no rashes or jaundice  PSYCH: normal affect    Labs:   Recent Labs     12/10/23  1105 12/11/23  0413   WBC 6.88 6.44   MCV 90 90   * 462*     Recent Labs     12/10/23  1105 12/11/23  0413    139   K 3.7 3.6    105   CO2 27 28   BUN 26* 20   * 106     No results for input(s): "ALB" in the last 72 hours.    Invalid input(s): "ALKP", "SGOT", "SGPT", "TBIL", "DBIL", "TPRO"  No results for input(s): "PT", "INR", " ""PTT" in the last 72 hours.      Radiology Review:  CT Chest Abdomen Pelvis With IV Contrast (XPD) NO Oral Contrast   Final Result      X-Ray Chest AP Portable   Final Result          Chart review:     04/20/2023 colonoscopy Dr. doyle diverticulosis.  Small polyps in the sigmoid and ascending colon.  Random biopsies of normal mucosa  EGD 03/31/2023 normal esophagus irregular Z-line small hiatal hernia gastric polyps  EGD 06/20/2018.  Normal esophagus.  Irregular Z-line.  Small hiatal hernia.  Gastroesophageal polyp    IMPRESSION / RECOMMENDATIONS:  83-year-old female patient with a history of coronary disease status post PCI, VFib arrest, type 2 diabetes, hyperlipidemia with recent right lobe adenocarcinoma status post lobectomy presents with eructation belching which may be manifestation of pleural effusion seen on imaging.    Differential also considers a gastroparesis associated with patients with lung cancer.      Patient is also noted to have a drop in her hemoglobin down to 8.8  We will expedite EGD today with abbreviated evaluation considering recent Plavix administration but better to optimize further management of her pleural effusion and anemia.      Bronchoscopy is planned for tomorrow    .    Thank you for this consult.    Pretty Salgado  12/11/2023  11:11 AM       "

## 2023-12-11 NOTE — ANESTHESIA PREPROCEDURE EVALUATION
12/11/2023  Alexa Otero is a 83 y.o., female.      Tobacco Use:  The patient  reports that she quit smoking about 42 years ago. Her smoking use included cigarettes. She started smoking about 62 years ago. She has a 20.0 pack-year smoking history. She has never used smokeless tobacco.     Results for orders placed or performed during the hospital encounter of 12/10/23   EKG 12-lead    Collection Time: 12/10/23 10:23 AM    Narrative    Test Reason : R06.02,    Vent. Rate : 095 BPM     Atrial Rate : 095 BPM     P-R Int : 128 ms          QRS Dur : 064 ms      QT Int : 376 ms       P-R-T Axes : 060 041 088 degrees     QTc Int : 472 ms    Sinus rhythm with Premature supraventricular complexes  Anteroseptal infarct ,age undetermined  Abnormal ECG  When compared with ECG of 03-DEC-2023 18:38,  Premature supraventricular complexes are now Present  Anteroseptal infarct is now Present  Confirmed by Luis Eduardo LAWSON, Narciso SAMANIEGO ([id number]) on 12/10/2023 9:37:25 PM    Referred By: [id number]   SELF           Confirmed By:Narciso Hoff MD        Imaging Results              CT Chest Abdomen Pelvis With IV Contrast (XPD) NO Oral Contrast (Edited Result - FINAL)  Result time 12/10/23 13:42:34   Procedure changed from CT Abdomen Pelvis With IV Contrast NO Oral Contrast     Edited Result - FINAL by Chichi Gann MD (12/10/23 13:42:34)                   Narrative:         ADDENDUM #1       This is a correction to the impression    Findings are compatible with multifocal pneumonia.    Electronically signed by:  Chichi Gann MD  12/10/2023 01:42 PM CST Workstation: [id number]       ORIGINAL REPORT       CMS MANDATED QUALITY DATA - CT RADIATION - 436    All CT scans at this facility utilize dose modulation, iterative reconstruction, and/or weight based dosing when appropriate to reduce radiation dose to as low as reasonably  achievable.        Reason: Upper Abdominal pain , lung cancer, right thoracotomy    TECHNIQUE: CT thorax, abdomen, and pelvis with 100 mL Omnipaque 350.    COMPARISON: CTA chest dated 12/4/2023 PET/CT dated 8/15/2023    CT THORAX:  Base of the neck is unremarkable. There are stable tiny thyroid nodules. There is no supraclavicular adenopathy.  There is a right IJ Port-A-Cath with the tip in the superior vena cava.    LUNGS: Progression of the alveolar consolidation in the right mid and lower lobe with moderate size right pleural effusion. There is progression of the groundglass and interstitial opacities in the right upper lobe.  Patchy groundglass opacities in the lateral left upper lobe and posterior left lower lobe.  There are mild emphysematous changes.    Heart is normal in size. There is multivessel coronary artery calcification. Aorta is normal in caliber.  Mediastinum: There is no pathologic adenopathy. There are mildly prominent mediastinal lymph nodes which may be reactive.    There are bilateral rim calcified breast implants. The musculature is normal.  There are degenerative changes of the spine. There are no lytic or blastic lesions.    CT ABDOMEN:  Liver is normal in size and contour. The liver is hypodense compatible with fatty infiltration.  There is a stable 13 mm low-density lesion within the anterior segment of the right lobe of the liver. There is a 4 cm cyst within the posterior segment of the right lobe of the liver.  There is no biliary duct dilatation. The gallbladder is absent.    Spleen is normal in size and contour    Pancreas demonstrate normal enhancement. There is no focal mass.    There are no thick-walled or dilated bowel loops. There is colonic diverticulosis without diverticulitis.  There is no mesenteric or retroperitoneal adenopathy.  There is a stable 3.1 cm rim calcified mass in the right upper quadrant  The aorta is normal in caliber. There is moderate vascular  calcification.    There is no free fluid or free air.  The musculature is normal.  There is rightward curvature of the lumbar spine. There are degenerative changes of the spine without lytic or blastic lesions.    CT PELVIS:  The bladder is normal. There is colonic diverticulosis. There is no pelvic adenopathy. There is hardware within the right hip.    IMPRESSION:  Progression of the alveolar consolidation in the right mid and lower lung with moderate size right pleural effusion. There is progression of the airspace disease within the right upper lobe with patchy groundglass opacity left upper lobe and left lung base. Findings are compatible with multifocal.    Moderate vascular calcification of the coronary arteries and aorta    Fatty infiltration of the liver with stable hypodense lesions within the liver compatible with cysts    Colonic diverticulosis without diverticulitis    Prior cholecystectomy and hysterectomy    Stable rim calcified mass in the right upper quadrant    Degenerative changes of the spine    Electronically signed by:  Chichi Gann MD  12/10/2023 01:29 PM CST Workstation: [id number]                      Final result by Chichi Gann MD (12/10/23 13:29:30)                   Narrative:    CMS MANDATED QUALITY DATA - CT RADIATION - 436    All CT scans at this facility utilize dose modulation, iterative reconstruction, and/or weight based dosing when appropriate to reduce radiation dose to as low as reasonably achievable.        Reason: Upper Abdominal pain , lung cancer, right thoracotomy    TECHNIQUE: CT thorax, abdomen, and pelvis with 100 mL Omnipaque 350.    COMPARISON: CTA chest dated 12/4/2023 PET/CT dated 8/15/2023    CT THORAX:  Base of the neck is unremarkable. There are stable tiny thyroid nodules. There is no supraclavicular adenopathy.  There is a right IJ Port-A-Cath with the tip in the superior vena cava.    LUNGS: Progression of the alveolar consolidation in the right mid  and lower lobe with moderate size right pleural effusion. There is progression of the groundglass and interstitial opacities in the right upper lobe.  Patchy groundglass opacities in the lateral left upper lobe and posterior left lower lobe.  There are mild emphysematous changes.    Heart is normal in size. There is multivessel coronary artery calcification. Aorta is normal in caliber.  Mediastinum: There is no pathologic adenopathy. There are mildly prominent mediastinal lymph nodes which may be reactive.    There are bilateral rim calcified breast implants. The musculature is normal.  There are degenerative changes of the spine. There are no lytic or blastic lesions.    CT ABDOMEN:  Liver is normal in size and contour. The liver is hypodense compatible with fatty infiltration.  There is a stable 13 mm low-density lesion within the anterior segment of the right lobe of the liver. There is a 4 cm cyst within the posterior segment of the right lobe of the liver.  There is no biliary duct dilatation. The gallbladder is absent.    Spleen is normal in size and contour    Pancreas demonstrate normal enhancement. There is no focal mass.    There are no thick-walled or dilated bowel loops. There is colonic diverticulosis without diverticulitis.  There is no mesenteric or retroperitoneal adenopathy.  There is a stable 3.1 cm rim calcified mass in the right upper quadrant  The aorta is normal in caliber. There is moderate vascular calcification.    There is no free fluid or free air.  The musculature is normal.  There is rightward curvature of the lumbar spine. There are degenerative changes of the spine without lytic or blastic lesions.    CT PELVIS:  The bladder is normal. There is colonic diverticulosis. There is no pelvic adenopathy. There is hardware within the right hip.    IMPRESSION:  Progression of the alveolar consolidation in the right mid and lower lung with moderate size right pleural effusion. There is  progression of the airspace disease within the right upper lobe with patchy groundglass opacity left upper lobe and left lung base. Findings are compatible with multifocal.    Moderate vascular calcification of the coronary arteries and aorta    Fatty infiltration of the liver with stable hypodense lesions within the liver compatible with cysts    Colonic diverticulosis without diverticulitis    Prior cholecystectomy and hysterectomy    Stable rim calcified mass in the right upper quadrant    Degenerative changes of the spine    Electronically signed by:  Chichi Gann MD  12/10/2023 01:29 PM CST Workstation: [id number]                                     X-Ray Chest AP Portable (Final result)  Result time 12/10/23 11:54:03      Final result by Chichi Gann MD (12/10/23 11:54:03)                   Narrative:    Portable chest x-ray 11:06 AM is compared to prior study dated 12/6/2023    Clinical history is cough and shortness of breath    There is a right IJ Port-A-Cath in stable position.  The cardiomediastinal silhouette is stable.  There is progression of the alveolar consolidation in the right mid and lower lung with small right pleural effusion. There are stable interstitial and groundglass opacity in the right upper lobe.  The left lung is clear.  There are bilateral breast implants. There are no acute osseous abnormalities.    IMPRESSION: Progression of the alveolar consolidation in the right mid and lower lung with small right pleural effusion.  There are groundglass and interstitial opacities in the right upper lobe    Electronically signed by:  Chichi Gann MD  12/10/2023 11:54 AM CST Workstation: [id number]                                     Lab Results   Component Value Date    WBC 6.44 12/11/2023    HGB 8.8 (L) 12/11/2023    HCT 27.9 (L) 12/11/2023    MCV 90 12/11/2023     (H) 12/11/2023     BMP  Lab Results   Component Value Date     12/11/2023    K 3.6 12/11/2023      12/11/2023    CO2 28 12/11/2023    BUN 20 12/11/2023    CREATININE 0.9 12/11/2023    CALCIUM 8.4 (L) 12/11/2023    ANIONGAP 6 (L) 12/11/2023     12/11/2023     (H) 12/10/2023     (H) 12/06/2023       Results for orders placed during the hospital encounter of 10/13/23    Echo    Interpretation Summary    Left Ventricle: The left ventricle is normal in size. Normal wall thickness. Normal wall motion. There is normal systolic function with a visually estimated ejection fraction of 55 - 60%. Grade I diastolic dysfunction.    Right Ventricle: Normal right ventricular cavity size. Wall thickness is normal. Right ventricle wall motion  is normal. Systolic function is normal.    Aortic Valve: There is moderate aortic valve sclerosis.    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 There is mild mitral annular calcification present. There is no stenosis. The mean pressure gradient across the mitral valve is 3 mmHg at a heart rate of  bpm. There is mild regurgitation.    Tricuspid Valve: There is mild regurgitation.  No pulmonary hypertension.    IVC/SVC: Normal venous pressure at 3 mmHg.     10/27/23 08:52   Group & Rh A POS   INDIRECT ELIAS NEG   Specimen Outdate 11/10/2023 23:59       Pre-op Assessment    I have reviewed the Patient Summary Reports.     I have reviewed the Nursing Notes. I have reviewed the NPO Status.   I have reviewed the Medications.     Review of Systems  Anesthesia Hx:  No problems with previous Anesthesia             Denies Family Hx of Anesthesia complications.   Personal Hx of Anesthesia complications, Post-Operative Nausea/Vomiting, in the past, but not with recent anesthetics / prophylaxis                    Social:  Alcohol Use, Former Smoker       Hematology/Oncology:                   Hematology Comments: Plavix Therapy       --  Cancer in past history:              surgery and chemotherapy   Oncology Comments: Extra-ovarian endometrioid  adenocarcinoma    Squamous cell carcinoma     EENT/Dental:  EENT/Dental Normal  Macular degeneration Eyes: Visual Impairment   Has Bilateral and S/P Extraction - Bilateral Catarract                  Cardiovascular:     Hypertension, poorly controlled   CAD  asymptomatic CABG/stent        hyperlipidemia   ECG has been reviewed. Hx of cardiac arrest secondary to ventricular fibrillation during cardiac stent placement     Patient cleared for subsequent surgery by Dr. Jluio                          Pulmonary:         History of chronic cough    Hx Aspiration pneumonia of right lung               Renal/:  Chronic Renal Disease        Kidney Function/Disease, Chronic Kidney Disease (CKD) , CKD Stage III (GFR 30-59)            Hepatic/GI:     GERD, well controlled Liver Disease,  History of colon polyps    Pancreatic pseudocyst/cyst    Mid-epigastric pain this admission  Esophageal / Stomach Disorders Esophageal Disorder, Kwon's Esophagus        Pancreatic Disease   Musculoskeletal:  Arthritis      Joint Disease:  Arthritis, Osteoarthritis     Spine Disorders: lumbar Chronic Pain           Neurological:    Neuromuscular Disease,       Sciatica    Weakness of right lower extremity    Osteoarthritis                           Endocrine:  Diabetes, type 2           Dermatological:  Skin Normal    Psych:  Psychiatric Normal                    Physical Exam  General: Well nourished, Cooperative, Alert and Oriented    Airway:  Mallampati: III / II  Mouth Opening: Normal  TM Distance: > 6 cm  Tongue: Normal  Neck ROM: Normal ROM    Dental:  Intact, Caps / Implants    Chest/Lungs:  Clear to auscultation, Normal Respiratory Rate    Heart:  Rate: Normal  Rhythm: Regular Rhythm        Anesthesia Plan  Type of Anesthesia, risks & benefits discussed:    Anesthesia Type: Gen Natural Airway  Intra-op Monitoring Plan: Standard ASA Monitors  Post Op Pain Control Plan:   (medical reason for not using multimodal pain  management)  Induction:  IV  Airway Plan: Direct and Video, Post-Induction  Informed Consent: Informed consent signed with the Patient and all parties understand the risks and agree with anesthesia plan.  All questions answered. Patient consented to blood products? Yes  ASA Score: 3 Emergent  Anesthesia Plan Notes:     GNA  Propofol  POM  Zofran     Ready For Surgery From Anesthesia Perspective.     .

## 2023-12-11 NOTE — PROGRESS NOTES
[facility]  Progress Note    Patient Name: Alexa Otero  MRN: [id number]  Patient Class: OP- Observation   Admission Date: 12/10/2023  Length of Stay: 0 days  Attending Physician: Harinder Lemon MD  Primary Care Provider: Idalia Greco MD        Subjective:     Principal Problem:Pneumonia        HPI:  Patient is an 83-year-old female with history of CAD status post PCI, VFib arrest, type 2 diabetes, hyperlipidemia, hypertension, right lobe adenocarcinoma status post lobectomy who presents with epigastric discomfort.  Patient says this began about 3 days ago and have been worsening.  Patient denies any abdominal pain but says it is very uncomfortable.  Has had associated nausea but no vomiting.  Patient says she is constantly burping.  Denies any constipation or diarrhea.  Patient was recently treated for multifocal pneumonia and pleural effusion and discharged home on December 6 with cefdinir and azithromycin.  Patient says she never took any of the antibiotics at home as she says her  did not pick them up.  Patient does have shortness of breath with exertion.  Denies any fevers, chills, or cough.  In ED, patient was 94% on room air.  WBC 6.8 and hemoglobin 10.0.  Creatinine 1.0.  .    Chest x-ray  Progression of the alveolar consolidation in the right mid and lower lung with small right pleural effusion.  There are groundglass and interstitial opacities in the right upper lobe    CT chest abdomen pelvis   Progression of the alveolar consolidation in the right mid and lower lung with moderate size right pleural effusion. There is progression of the airspace disease within the right upper lobe with patchy groundglass opacity left upper lobe and left lung base. Findings are compatible with multifocal.  Moderate vascular calcification of the coronary arteries and aorta  Fatty infiltration of the liver with stable hypodense lesions within the liver compatible with  cysts  Colonic diverticulosis without diverticulitis  Prior cholecystectomy and hysterectomy  Stable rim calcified mass in the right upper quadrant  Degenerative changes of the spine    Overview/Hospital Course:  Ms. Otero is a 82 yo w/pmhx of CAD, Vfib arrest, T2DM, R lobe adenocarcinoma s/p lobectomy who is admitted for recurrent pneumonia and belching. She was recenlty treated for a pneumonia and discharged on cefdinir and azithromycin. However, patient was unable to  her medications. Cxr revealed progression of the R mid and lower lung consolidation and small pleural effusion. Pulm was consulted and plan for bronch/BAL. GI also consulted for belching and EGD scheduled for 12/11.     Interval History: Patient denies any SOB this AM. She continues to have abdominal discomfort but reports improved from yesterday. No n/v. Denies any melena/hematochezia.     Review of Systems   All other systems reviewed and are negative.    Objective:     Vital Signs (Most Recent):  Temp: 98.2 °F (36.8 °C) (12/11/23 1335)  Pulse: 74 (12/11/23 1350)  Resp: 20 (12/11/23 1350)  BP: (!) 91/48 (12/11/23 1350)  SpO2: (!) 94 % (12/11/23 1350) Vital Signs (24h Range):  Temp:  [97.6 °F (36.4 °C)-98.9 °F (37.2 °C)] 98.2 °F (36.8 °C)  Pulse:  [70-93] 74  Resp:  [16-23] 20  SpO2:  [92 %-100 %] 94 %  BP: ()/(45-86) 91/48     Weight: 54.4 kg (120 lb)  Body mass index is 21.26 kg/m².    Intake/Output Summary (Last 24 hours) at 12/11/2023 1358  Last data filed at 12/11/2023 1333  Gross per 24 hour   Intake 550.05 ml   Output --   Net 550.05 ml         Physical Exam  Constitutional:       Appearance: Normal appearance.   HENT:      Head: Normocephalic and atraumatic.      Right Ear: External ear normal.      Left Ear: External ear normal.      Nose: Nose normal.      Mouth/Throat:      Mouth: Mucous membranes are moist.   Eyes:      Extraocular Movements: Extraocular movements intact.   Cardiovascular:      Rate and Rhythm: Normal rate  and regular rhythm.      Heart sounds: No murmur heard.  Pulmonary:      Effort: No respiratory distress.      Breath sounds: No wheezing.   Abdominal:      General: Abdomen is flat. Bowel sounds are normal. There is no distension.   Musculoskeletal:      Right lower leg: No edema.      Left lower leg: No edema.   Skin:     General: Skin is warm.   Neurological:      General: No focal deficit present.      Mental Status: She is alert and oriented to person, place, and time.   Psychiatric:         Mood and Affect: Mood normal.         Behavior: Behavior normal.             Significant Labs: All pertinent labs within the past 24 hours have been reviewed.    Significant Imaging: I have reviewed all pertinent imaging results/findings within the past 24 hours.    Assessment/Plan:      * Pneumonia  Patient was recently treated for multifocal pneumonia and pleural effusion status post thoracentesis.  Patient was discharged home on December 6 with cefdinir and azithromycin.  Patient was unable to  the antibiotics from pharmacy.  CT shows progression of airspace disease and moderate right pleural effusion.  -pulm consulted   -bronch w/BAL on 12/12  -ctx/azithro         Epigastric discomfort  -IV PPI   -GI consulted   -EGD on 12/11  -stool h.pylori sent       Malignant neoplasm of lower lobe of right lung  Follows with Dr. Lloyd outpatient, status post right lower lobe lobectomy.  Has not started chemotherapy yet.     Cardiopulmonary arrest  History of VFib arrest on 10/13/2023, currently on amiodarone  On dual antiplatelet therapy for CAD.      VTE Risk Mitigation (From admission, onward)           Ordered     enoxaparin injection 40 mg  Daily         12/10/23 1403     IP VTE HIGH RISK PATIENT  Once         12/10/23 1403     Place sequential compression device  Until discontinued         12/10/23 1403                    Discharge Planning   ALYCIA: 12/12/2023     Code Status: Full Code   Is the patient medically ready  for discharge?:     Reason for patient still in hospital (select all that apply): Treatment and Consult recommendations  Discharge Plan A: Home with family                  Harinder Lemon MD  Department of Hospital Medicine   [facility]

## 2023-12-11 NOTE — ANESTHESIA PREPROCEDURE EVALUATION
12/11/2023  Alexa Otero is a 83 y.o., female.      Tobacco Use:  The patient  reports that she quit smoking about 42 years ago. Her smoking use included cigarettes. She started smoking about 62 years ago. She has a 20.0 pack-year smoking history. She has never used smokeless tobacco.     Results for orders placed or performed during the hospital encounter of 12/10/23   EKG 12-lead    Collection Time: 12/10/23 10:23 AM    Narrative    Test Reason : R06.02,    Vent. Rate : 095 BPM     Atrial Rate : 095 BPM     P-R Int : 128 ms          QRS Dur : 064 ms      QT Int : 376 ms       P-R-T Axes : 060 041 088 degrees     QTc Int : 472 ms    Sinus rhythm with Premature supraventricular complexes  Anteroseptal infarct ,age undetermined  Abnormal ECG  When compared with ECG of 03-DEC-2023 18:38,  Premature supraventricular complexes are now Present  Anteroseptal infarct is now Present  Confirmed by Luis Eduardo LAWSON, Narciso SAMANIEGO ([id number]) on 12/10/2023 9:37:25 PM    Referred By: [email or web address]   SELF           Confirmed By:Narciso Hoff MD        Imaging Results              CT Chest Abdomen Pelvis With IV Contrast (XPD) NO Oral Contrast (Edited Result - FINAL)  Result time 12/10/23 13:42:34   Procedure changed from CT Abdomen Pelvis With IV Contrast NO Oral Contrast     Edited Result - FINAL by Chichi Gann MD (12/10/23 13:42:34)                   Narrative:         ADDENDUM #1       This is a correction to the impression    Findings are compatible with multifocal pneumonia.    Electronically signed by:  Chichi Gann MD  12/10/2023 01:42 PM CST Workstation: [id number]       ORIGINAL REPORT       CMS MANDATED QUALITY DATA - CT RADIATION - 436    All CT scans at this facility utilize dose modulation, iterative reconstruction, and/or weight based dosing when appropriate to reduce radiation dose to as low as reasonably  achievable.        Reason: Upper Abdominal pain , lung cancer, right thoracotomy    TECHNIQUE: CT thorax, abdomen, and pelvis with 100 mL Omnipaque 350.    COMPARISON: CTA chest dated 12/4/2023 PET/CT dated 8/15/2023    CT THORAX:  Base of the neck is unremarkable. There are stable tiny thyroid nodules. There is no supraclavicular adenopathy.  There is a right IJ Port-A-Cath with the tip in the superior vena cava.    LUNGS: Progression of the alveolar consolidation in the right mid and lower lobe with moderate size right pleural effusion. There is progression of the groundglass and interstitial opacities in the right upper lobe.  Patchy groundglass opacities in the lateral left upper lobe and posterior left lower lobe.  There are mild emphysematous changes.    Heart is normal in size. There is multivessel coronary artery calcification. Aorta is normal in caliber.  Mediastinum: There is no pathologic adenopathy. There are mildly prominent mediastinal lymph nodes which may be reactive.    There are bilateral rim calcified breast implants. The musculature is normal.  There are degenerative changes of the spine. There are no lytic or blastic lesions.    CT ABDOMEN:  Liver is normal in size and contour. The liver is hypodense compatible with fatty infiltration.  There is a stable 13 mm low-density lesion within the anterior segment of the right lobe of the liver. There is a 4 cm cyst within the posterior segment of the right lobe of the liver.  There is no biliary duct dilatation. The gallbladder is absent.    Spleen is normal in size and contour    Pancreas demonstrate normal enhancement. There is no focal mass.    There are no thick-walled or dilated bowel loops. There is colonic diverticulosis without diverticulitis.  There is no mesenteric or retroperitoneal adenopathy.  There is a stable 3.1 cm rim calcified mass in the right upper quadrant  The aorta is normal in caliber. There is moderate vascular  calcification.    There is no free fluid or free air.  The musculature is normal.  There is rightward curvature of the lumbar spine. There are degenerative changes of the spine without lytic or blastic lesions.    CT PELVIS:  The bladder is normal. There is colonic diverticulosis. There is no pelvic adenopathy. There is hardware within the right hip.    IMPRESSION:  Progression of the alveolar consolidation in the right mid and lower lung with moderate size right pleural effusion. There is progression of the airspace disease within the right upper lobe with patchy groundglass opacity left upper lobe and left lung base. Findings are compatible with multifocal.    Moderate vascular calcification of the coronary arteries and aorta    Fatty infiltration of the liver with stable hypodense lesions within the liver compatible with cysts    Colonic diverticulosis without diverticulitis    Prior cholecystectomy and hysterectomy    Stable rim calcified mass in the right upper quadrant    Degenerative changes of the spine    Electronically signed by:  Chichi Gann MD  12/10/2023 01:29 PM CST Workstation: [id number]                      Final result by Chichi Gann MD (12/10/23 13:29:30)                   Narrative:    CMS MANDATED QUALITY DATA - CT RADIATION - 436    All CT scans at this facility utilize dose modulation, iterative reconstruction, and/or weight based dosing when appropriate to reduce radiation dose to as low as reasonably achievable.        Reason: Upper Abdominal pain , lung cancer, right thoracotomy    TECHNIQUE: CT thorax, abdomen, and pelvis with 100 mL Omnipaque 350.    COMPARISON: CTA chest dated 12/4/2023 PET/CT dated 8/15/2023    CT THORAX:  Base of the neck is unremarkable. There are stable tiny thyroid nodules. There is no supraclavicular adenopathy.  There is a right IJ Port-A-Cath with the tip in the superior vena cava.    LUNGS: Progression of the alveolar consolidation in the right mid  and lower lobe with moderate size right pleural effusion. There is progression of the groundglass and interstitial opacities in the right upper lobe.  Patchy groundglass opacities in the lateral left upper lobe and posterior left lower lobe.  There are mild emphysematous changes.    Heart is normal in size. There is multivessel coronary artery calcification. Aorta is normal in caliber.  Mediastinum: There is no pathologic adenopathy. There are mildly prominent mediastinal lymph nodes which may be reactive.    There are bilateral rim calcified breast implants. The musculature is normal.  There are degenerative changes of the spine. There are no lytic or blastic lesions.    CT ABDOMEN:  Liver is normal in size and contour. The liver is hypodense compatible with fatty infiltration.  There is a stable 13 mm low-density lesion within the anterior segment of the right lobe of the liver. There is a 4 cm cyst within the posterior segment of the right lobe of the liver.  There is no biliary duct dilatation. The gallbladder is absent.    Spleen is normal in size and contour    Pancreas demonstrate normal enhancement. There is no focal mass.    There are no thick-walled or dilated bowel loops. There is colonic diverticulosis without diverticulitis.  There is no mesenteric or retroperitoneal adenopathy.  There is a stable 3.1 cm rim calcified mass in the right upper quadrant  The aorta is normal in caliber. There is moderate vascular calcification.    There is no free fluid or free air.  The musculature is normal.  There is rightward curvature of the lumbar spine. There are degenerative changes of the spine without lytic or blastic lesions.    CT PELVIS:  The bladder is normal. There is colonic diverticulosis. There is no pelvic adenopathy. There is hardware within the right hip.    IMPRESSION:  Progression of the alveolar consolidation in the right mid and lower lung with moderate size right pleural effusion. There is  progression of the airspace disease within the right upper lobe with patchy groundglass opacity left upper lobe and left lung base. Findings are compatible with multifocal.    Moderate vascular calcification of the coronary arteries and aorta    Fatty infiltration of the liver with stable hypodense lesions within the liver compatible with cysts    Colonic diverticulosis without diverticulitis    Prior cholecystectomy and hysterectomy    Stable rim calcified mass in the right upper quadrant    Degenerative changes of the spine    Electronically signed by:  Chichi Gann MD  12/10/2023 01:29 PM CST Workstation: [id number]                                     X-Ray Chest AP Portable (Final result)  Result time 12/10/23 11:54:03      Final result by Chichi Gann MD (12/10/23 11:54:03)                   Narrative:    Portable chest x-ray 11:06 AM is compared to prior study dated 12/6/2023    Clinical history is cough and shortness of breath    There is a right IJ Port-A-Cath in stable position.  The cardiomediastinal silhouette is stable.  There is progression of the alveolar consolidation in the right mid and lower lung with small right pleural effusion. There are stable interstitial and groundglass opacity in the right upper lobe.  The left lung is clear.  There are bilateral breast implants. There are no acute osseous abnormalities.    IMPRESSION: Progression of the alveolar consolidation in the right mid and lower lung with small right pleural effusion.  There are groundglass and interstitial opacities in the right upper lobe    Electronically signed by:  Chichi Gann MD  12/10/2023 11:54 AM CST Workstation: [id number]                                     Lab Results   Component Value Date    WBC 6.44 12/11/2023    HGB 8.8 (L) 12/11/2023    HCT 27.9 (L) 12/11/2023    MCV 90 12/11/2023     (H) 12/11/2023     BMP  Lab Results   Component Value Date     12/11/2023    K 3.6 12/11/2023      12/11/2023    CO2 28 12/11/2023    BUN 20 12/11/2023    CREATININE 0.9 12/11/2023    CALCIUM 8.4 (L) 12/11/2023    ANIONGAP 6 (L) 12/11/2023     12/11/2023     (H) 12/10/2023     (H) 12/06/2023       Results for orders placed during the hospital encounter of 10/13/23    Echo    Interpretation Summary    Left Ventricle: The left ventricle is normal in size. Normal wall thickness. Normal wall motion. There is normal systolic function with a visually estimated ejection fraction of 55 - 60%. Grade I diastolic dysfunction.    Right Ventricle: Normal right ventricular cavity size. Wall thickness is normal. Right ventricle wall motion  is normal. Systolic function is normal.    Aortic Valve: There is moderate aortic valve sclerosis.    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 There is mild mitral annular calcification present. There is no stenosis. The mean pressure gradient across the mitral valve is 3 mmHg at a heart rate of  bpm. There is mild regurgitation.    Tricuspid Valve: There is mild regurgitation.  No pulmonary hypertension.    IVC/SVC: Normal venous pressure at 3 mmHg.     10/27/23 08:52   Group & Rh A POS   INDIRECT ELIAS NEG   Specimen Outdate 11/10/2023 23:59       Pre-op Assessment    I have reviewed the Patient Summary Reports.     I have reviewed the Nursing Notes. I have reviewed the NPO Status.   I have reviewed the Medications.     Review of Systems  Anesthesia Hx:  No problems with previous Anesthesia             Denies Family Hx of Anesthesia complications.   Personal Hx of Anesthesia complications, Post-Operative Nausea/Vomiting, in the past, but not with recent anesthetics / prophylaxis                    Social:  Alcohol Use, Former Smoker       Hematology/Oncology:       -- Anemia:               Hematology Comments: Plavix Therapy       --  Cancer in past history:              surgery and chemotherapy   Oncology Comments: Extra-ovarian endometrioid  adenocarcinoma    Squamous cell carcinoma     EENT/Dental:  EENT/Dental Normal  Macular degeneration Eyes: Visual Impairment   Has Bilateral and S/P Extraction - Bilateral Catarract                  Cardiovascular:     Hypertension, poorly controlled   CAD  asymptomatic CABG/stent        hyperlipidemia   ECG has been reviewed. Hx of cardiac arrest secondary to ventricular fibrillation during cardiac stent placement     Patient cleared for subsequent surgery by Dr. Julio                          Pulmonary:  Pneumonia       History of chronic cough    Hx Aspiration pneumonia of right lung               Renal/:  Chronic Renal Disease        Kidney Function/Disease, Chronic Kidney Disease (CKD) , CKD Stage III (GFR 30-59)            Hepatic/GI:     GERD, well controlled Liver Disease,  History of colon polyps    Pancreatic pseudocyst/cyst    Mid-epigastric pain this admission s/p EGD 12/11/23  Esophageal / Stomach Disorders Esophageal Disorder, Kwon's Esophagus        Pancreatic Disease   Musculoskeletal:  Arthritis      Joint Disease:  Arthritis, Osteoarthritis     Spine Disorders: lumbar Chronic Pain           Neurological:    Neuromuscular Disease,       Sciatica    Weakness of right lower extremity    Osteoarthritis                           Endocrine:  Diabetes, type 2           Dermatological:  Skin Normal    Psych:  Psychiatric Normal                    Physical Exam  General: Well nourished, Cooperative, Alert and Oriented    Airway:  Mallampati: III / II  Mouth Opening: Normal  TM Distance: > 6 cm  Tongue: Normal  Neck ROM: Normal ROM    Dental:  Intact, Caps / Implants    Chest/Lungs:  Clear to auscultation, Normal Respiratory Rate    Heart:  Rate: Normal  Rhythm: Regular Rhythm        Anesthesia Plan  Type of Anesthesia, risks & benefits discussed:    Anesthesia Type: Gen Natural Airway  Intra-op Monitoring Plan: Standard ASA Monitors  Post Op Pain Control Plan:   (medical reason for not using  multimodal pain management)  Induction:  IV  Airway Plan: Direct and Video, Post-Induction  Informed Consent: Informed consent signed with the Patient and all parties understand the risks and agree with anesthesia plan.  All questions answered. Patient consented to blood products? Yes  ASA Score: 3  Anesthesia Plan Notes: GNA  Propofol  POM  Zofran     Ready For Surgery From Anesthesia Perspective.     .

## 2023-12-11 NOTE — PROVATION PATIENT INSTRUCTIONS
Discharge Summary/Instructions after an Endoscopic Procedure  Patient Name: Alexa Otero  Patient MRN: [id number]  Patient YOB: 1940 Monday, December 11, 2023  Pretty Salgado MD  RESTRICTIONS:  During your procedure today, you received medications for sedation.  These   medications may affect your judgment, balance and coordination.  Therefore,   for 24 hours, you have the following restrictions:   - DO NOT drive a car, operate machinery, make legal/financial decisions,   sign important papers or drink alcohol.    ACTIVITY:  Today: no heavy lifting, straining or running due to procedural   sedation/anesthesia.  The following day: return to full activity including work.  DIET:  Eat and drink normally unless instructed otherwise.     TREATMENT FOR COMMON SIDE EFFECTS:  - Mild abdominal pain, nausea, belching, bloating or excessive gas:  rest,   eat lightly and use a heating pad.  - Sore Throat: treat with throat lozenges and/or gargle with warm salt   water.  - Because air was used during the procedure, expelling large amounts of air   from your rectum or belching is normal.  - If a bowel prep was taken, you may not have a bowel movement for 1-3 days.    This is normal.  SYMPTOMS TO WATCH FOR AND REPORT TO YOUR PHYSICIAN:  1. Abdominal pain or bloating, other than gas cramps.  2. Chest pain.  3. Back pain.  4. Signs of infection such as: chills or fever occurring within 24 hours   after the procedure.  5. Rectal bleeding, which would show as bright red, maroon, or black stools.   (A tablespoon of blood from the rectum is not serious, especially if   hemorrhoids are present.)  6. Vomiting.  7. Weakness or dizziness.  GO DIRECTLY TO THE NEAREST EMERGENCY ROOM IF YOU HAVE ANY OF THE FOLLOWING:      Difficulty breathing              Chills and/or fever over 101 F   Persistent vomiting and/or vomiting blood   Severe abdominal pain   Severe chest pain   Black, tarry stools   Bleeding- more than one  tablespoon   Any other symptom or condition that you feel may need urgent attention  Your doctor recommends these additional instructions:  If any biopsies were taken, your doctors clinic will contact you in 1 to 2   weeks with any results.  - Await pathology results.   - Use Pepcid (famotidine) 40 mg PO daily for 2 months.   - Return patient to hospital dee for ongoing care.  For questions, problems or results please call your physician - Pretty Salgado MD at Work:  [phone].  [facility], EMERGENCY ROOM PHONE NUMBER: [phone]  IF A COMPLICATION OR EMERGENCY SITUATION ARISES AND YOU ARE UNABLE TO REACH   YOUR PHYSICIAN - GO DIRECTLY TO THE EMERGENCY ROOM.  Pretty Salgado MD  12/11/2023 1:38:36 PM  This report has been verified and signed electronically.  Dear patient,  As a result of recent federal legislation (The Federal Cures Act), you may   receive lab or pathology results from your procedure in your [facility]   account before your physician is able to contact you. Your physician or   their representative will relay the results to you with their   recommendations at their soonest availability.  Thank you,  PROVATION

## 2023-12-11 NOTE — PLAN OF CARE
[facility]  Initial Discharge Assessment       Primary Care Provider: Idalia Greco MD    Admission Diagnosis: Epigastric discomfort [R10.13]    Admission Date: 12/10/2023  Expected Discharge Date: 12/12/2023    Transition of Care Barriers: None     completed discharge assessment with Pt at bedside. Pt AAOx4s. Pt lives with spouse at address in chart. Demographics, PCP, and insurance verified. No home health. No dialysis. Pt completes ADLs without assistance. DME listed below. Pt to discharge home via family transport. Pt has no other needs to be addressed at this time. CM will continue to follow.     Payor: [employer] MEDICARE / Plan: [employer] MEDICARE HMO / Product Type: Capitation /     Extended Emergency Contact Information  Primary Emergency Contact: TRISTON LE  Mobile Phone: [phone]  Relation: Daughter  Secondary Emergency Contact: Porter Otero  Address: [address] [place] [address] [facility]  Home Phone: [phone]  Mobile Phone: [phone]  Relation: Spouse  Preferred language: English   needed? No    Discharge Plan A: Home with family  Discharge Plan B: Home with family      [id number] - [place], [place] - [address] [facility] [address]  [address]  [facility] [address]  Phone: [phone] Fax: [phone]      Initial Assessment (most recent)       Adult Discharge Assessment - 12/11/23 1156          Discharge Assessment    Assessment Type Discharge Planning Assessment     Confirmed/corrected address, phone number and insurance Yes     Confirmed Demographics Correct on Facesheet     Source of Information patient;health record     Does patient/caregiver understand observation status Yes     Reason For Admission Pneumonia     People in Home spouse     Facility Arrived From: home     Do you expect to return to your current living situation? Yes     Do you have help at home or someone to  help you manage your care at home? Yes     Who are your caregiver(s) and their phone number(s)? Porter Otero (spouse)     Prior to hospitilization cognitive status: Unable to Assess     Current cognitive status: Alert/Oriented     Walking or Climbing Stairs Difficulty no     Dressing/Bathing Difficulty yes     Dressing/Bathing bathing difficulty, requires equipment     Dressing/Bathing Management shower chair and bedside commode     Equipment Currently Used at Home bedside commode;shower chair     Readmission within 30 days? Yes     Patient currently being followed by outpatient case management? No     Do you currently have service(s) that help you manage your care at home? No     Do you take prescription medications? Yes     Do you have prescription coverage? Yes     Coverage [employer] MANAGED MEDICARE - [employer] MEDICARE HMO -     Do you have any problems affording any of your prescribed medications? No     Is the patient taking medications as prescribed? yes     Who is going to help you get home at discharge? Spouse     How do you get to doctors appointments? family or friend will provide     Are you on dialysis? No     Do you take coumadin? No     Discharge Plan A Home with family     Discharge Plan B Home with family     DME Needed Upon Discharge  none     Discharge Plan discussed with: Patient     Transition of Care Barriers None

## 2023-12-11 NOTE — ANESTHESIA POSTPROCEDURE EVALUATION
Anesthesia Post Evaluation    Patient: Alexa Otero    Procedure(s) Performed: Procedure(s) (LRB):  EGD (ESOPHAGOGASTRODUODENOSCOPY) (N/A)    Final Anesthesia Type: general      Patient location during evaluation: GI PACU  Patient participation: Yes- Able to Participate  Level of consciousness: awake and alert and oriented  Post-procedure vital signs: reviewed and stable  Pain management: adequate  Airway patency: patent    PONV status at discharge: No PONV  Anesthetic complications: no      Cardiovascular status: blood pressure returned to baseline  Respiratory status: unassisted, spontaneous ventilation and room air  Hydration status: euvolemic  Follow-up not needed.              Vitals Value Taken Time   /56 12/11/23 1355   Temp 36.8 °C (98.2 °F) 12/11/23 1335   Pulse 73 12/11/23 1357   Resp 23 12/11/23 1357   SpO2 92 % 12/11/23 1357   Vitals shown include unvalidated device data.      No case tracking events are documented in the log.      Pain/Jess Score: Pain Rating Prior to Med Admin: 3 (12/10/2023  6:35 PM)  Jess Score: 10 (12/11/2023  1:45 PM)

## 2023-12-11 NOTE — SUBJECTIVE & OBJECTIVE
Interval History: Patient denies any SOB this AM. She continues to have abdominal discomfort but reports improved from yesterday. No n/v. Denies any melena/hematochezia.     Review of Systems   All other systems reviewed and are negative.    Objective:     Vital Signs (Most Recent):  Temp: 98.2 °F (36.8 °C) (12/11/23 1335)  Pulse: 74 (12/11/23 1350)  Resp: 20 (12/11/23 1350)  BP: (!) 91/48 (12/11/23 1350)  SpO2: (!) 94 % (12/11/23 1350) Vital Signs (24h Range):  Temp:  [97.6 °F (36.4 °C)-98.9 °F (37.2 °C)] 98.2 °F (36.8 °C)  Pulse:  [70-93] 74  Resp:  [16-23] 20  SpO2:  [92 %-100 %] 94 %  BP: ()/(45-86) 91/48     Weight: 54.4 kg (120 lb)  Body mass index is 21.26 kg/m².    Intake/Output Summary (Last 24 hours) at 12/11/2023 1358  Last data filed at 12/11/2023 1333  Gross per 24 hour   Intake 550.05 ml   Output --   Net 550.05 ml         Physical Exam  Constitutional:       Appearance: Normal appearance.   HENT:      Head: Normocephalic and atraumatic.      Right Ear: External ear normal.      Left Ear: External ear normal.      Nose: Nose normal.      Mouth/Throat:      Mouth: Mucous membranes are moist.   Eyes:      Extraocular Movements: Extraocular movements intact.   Cardiovascular:      Rate and Rhythm: Normal rate and regular rhythm.      Heart sounds: No murmur heard.  Pulmonary:      Effort: No respiratory distress.      Breath sounds: No wheezing.   Abdominal:      General: Abdomen is flat. Bowel sounds are normal. There is no distension.   Musculoskeletal:      Right lower leg: No edema.      Left lower leg: No edema.   Skin:     General: Skin is warm.   Neurological:      General: No focal deficit present.      Mental Status: She is alert and oriented to person, place, and time.   Psychiatric:         Mood and Affect: Mood normal.         Behavior: Behavior normal.             Significant Labs: All pertinent labs within the past 24 hours have been reviewed.    Significant Imaging: I have reviewed  all pertinent imaging results/findings within the past 24 hours.

## 2023-12-11 NOTE — HOSPITAL COURSE
Ms. 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 She was recenlty treated for a pneumonia at [facility] for 4 days and discharged on cefdinir and azithromycin. However, patient was unable to  her last day of antibiotics. Cxr revealed progression of the R mid and lower lung consolidation and small pleural effusion. Pulm was consulted and patient bronched on 12/12. Respiratory culture w/normal cece. 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 GI also consulted for belching and EGD relatively normal s/p esophageal dilation to see if that would help. Belching thought to be possibly 2/2 gastroparesis. Pulm plans for BAL on Friday while allowing for the plavix to wash out. Plavix was discontinued and patient was started on eliquis. She will take her last dose on 12/13 and no more doses until the BAL. She will return outpatient for the BAL and will be given instructions then on when to resume plavix. Since bacterial pneumonia was thought to be less likely, patient was discharged w/out antibiotics.

## 2023-12-11 NOTE — PLAN OF CARE
Pt was explained ALBRECHT. Pt verbalized understanding of ALBRECHT and signed. ALBRECHT scanned to .    12/11/23 1200   ALBRECHT Message   Medicare Outpatient and Observation Notification regarding financial responsibility Given to patient/caregiver;Explained to patient/caregiver;Signed/date by patient/caregiver   Date ALBRECHT was signed 12/11/23   Time ALBRECHT was signed [id number]

## 2023-12-11 NOTE — PLAN OF CARE
Problem: Adult Inpatient Plan of Care  Goal: Absence of Hospital-Acquired Illness or Injury  Outcome: Ongoing, Progressing  Patient positions herself independently often  Goal: Optimal Comfort and Wellbeing  Outcome: Ongoing, Progressing   Patient ambulating to the toilet with minimal/standby assist. She verbalized feeling much stronger than she believed she was

## 2023-12-11 NOTE — TRANSFER OF CARE
"Anesthesia Transfer of Care Note    Patient: Alexa Otero    Procedure(s) Performed: Procedure(s) (LRB):  EGD (ESOPHAGOGASTRODUODENOSCOPY) (N/A)    Patient location: GI    Anesthesia Type: general    Transport from OR: Transported from OR on room air with adequate spontaneous ventilation    Post pain: adequate analgesia    Post assessment: no apparent anesthetic complications    Post vital signs: stable    Level of consciousness: awake and alert    Nausea/Vomiting: no nausea/vomiting    Complications: none    Transfer of care protocol was followed      Last vitals: Visit Vitals  BP (!) 180/76 (BP Location: Right arm, Patient Position: Lying)   Pulse 75   Temp 37.2 °C (98.9 °F) (Temporal)   Resp 16   Ht 5' 3" (1.6 m)   Wt 54.4 kg (120 lb)   SpO2 (!) 93%   Breastfeeding No   BMI 21.26 kg/m²     "

## 2023-12-11 NOTE — CONSULTS
Pulmonary/Critical Care Consult      PATIENT NAME: Alexa Otero  MRN: [id number]  TODAY'S DATE: 2023  10:20 AM  ADMIT DATE: 12/10/2023  AGE: 83 y.o. 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  She developed a right middle lobe right upper lobe pneumonic infiltrate with parapneumonic effusion last month.  She was treated with 4 days of antibiotics in the hospital and then did not get her 5th day as an outpatient.  The CT shows significant progression of the right middle lobe process with increasing infiltrate in the left lower lobe and 2 cavitary nodular areas in the left upper lobe that are fairly unchanged from November but were not there in August.  She also has a moderate fusion again.  This was a parapneumonic effusion last month.  The patient is coughing up a handful of clear mucus every hour.  This is what she was coughing up before her right lower lobe was resected.  I am very concerned that this is more of her adenocarcinoma.  The patient had recent cardiac stents placed right before her right lower lobe resection and is on dual antiplatelet therapy.  The patient does not appear toxic.  She was admitted to the hospital with abdominal pain.    REVIEW OF SYSTEMS  GENERAL: Feeling tired  EYES: Vision is good.  ENT: No sinusitis or pharyngitis.   HEART: No chest pain or palpitations.  LUNGS:  She is expectorating cupfuls of clear mucus a day.  GI:  She feels like she has gas but is not distended.  She has been nauseated.  : No dysuria, hesitancy, or nocturia.  SKIN: No lesions or rashes.  MUSCULOSKELETAL: No joint pain or myalgias.  NEURO: No headaches or neuropathy.  LYMPH: No edema or adenopathy.  PSYCH: No anxiety or depression.  ENDO: No weight change.    ALLERGIES  Review of patient's allergies indicates:  No Known  Allergies    INPATIENT SCHEDULED MEDICATIONS   amiodarone  200 mg Oral Daily    aspirin  81 mg Oral Daily    azithromycin  500 mg Intravenous Q24H    cefTRIAXone (ROCEPHIN) IVPB  1 g Intravenous Q24H    clopidogreL  75 mg Oral Daily    enoxparin  40 mg Subcutaneous Daily    gabapentin  100 mg Oral BID    metoprolol succinate  25 mg Oral Daily    pantoprazole  40 mg Intravenous Daily    simethicone  1 tablet Oral TID PC    sodium chloride 0.9%  10 mL Intravenous Q12H         MEDICAL AND SURGICAL HISTORY  Past Medical History:   Diagnosis Date    Arthritis     Cardiac arrest 10/2023    Cataract     Colon polyp     Diabetes mellitus type II 2012    Encounter for blood transfusion     Extra-ovarian endometrioid adenocarcinoma 2020    GERD (gastroesophageal reflux disease)     History of chronic cough     clear productive    Hyperlipidemia     Hypertension     Macular degeneration     Ovarian cancer     PONV (postoperative nausea and vomiting)     Squamous cell carcinoma 1980's    precancer of cervix     Past Surgical History:   Procedure Laterality Date    AUGMENTATION OF BREAST      BRONCHOSCOPY WITH FLUOROSCOPY Right 05/11/2023    Procedure: BRONCHOSCOPY, WITH FLUOROSCOPY;  Surgeon: Neva Christian MD;  Location: [facility];  Service: Pulmonary;  Laterality: Right;    CATARACT EXTRACTION BILATERAL W/ ANTERIOR VITRECTOMY Bilateral     CHOLECYSTECTOMY      COLONOSCOPY      COLONOSCOPY N/A 04/20/2023    Procedure: COLONOSCOPY;  Surgeon: Sabino Stephenson MD;  Location: [facility];  Service: Endoscopy;  Laterality: N/A;    CORONARY STENT PLACEMENT  10/2023    x 3    ESOPHAGOGASTRODUODENOSCOPY N/A 06/20/2018    Procedure: EGD (ESOPHAGOGASTRODUODENOSCOPY);  Surgeon: Sabino Stephenson MD;  Location: [facility];  Service: Endoscopy;  Laterality: N/A;    ESOPHAGOGASTRODUODENOSCOPY N/A 03/31/2023    Procedure: EGD (ESOPHAGOGASTRODUODENOSCOPY);  Surgeon: Sabino Stephenson MD;  Location: [facility];  Service: Endoscopy;   Laterality: N/A;    HYSTERECTOMY  1976    partial    INJECTION OF ANESTHETIC AGENT AROUND MEDIAL BRANCH NERVES INNERVATING LUMBAR FACET JOINT Right 05/10/2023    Procedure: Block-nerve-Lateral-branch-lumbar;  Surgeon: Agustin Robles MD;  Location: [facility];  Service: Pain Management;  Laterality: Right;  L5 and s1,s2 LBB    INJECTION OF ANESTHETIC AGENT AROUND MEDIAL BRANCH NERVES INNERVATING LUMBAR FACET JOINT Right 05/30/2023    Procedure: Block-nerve-medial branch-lumbar;  Surgeon: Agustin Robles MD;  Location: [facility] OR;  Service: Pain Management;  Laterality: Right;  L5, s1 ,s2 LBB #2    INJECTION OF ANESTHETIC AGENT AROUND NERVE Right 10/31/2023    Procedure: INTERCOSTAL NERVE BLOCK;  Surgeon: Washington Shankar MD;  Location: [facility];  Service: Cardiothoracic;  Laterality: Right;    INJECTION, SACROILIAC JOINT Right 01/26/2023    Procedure: INJECTION,SACROILIAC JOINT;  Surgeon: Agustin Robles MD;  Location: [facility] OR;  Service: Pain Management;  Laterality: Right;    INSERTION OF TUNNELED CENTRAL VENOUS CATHETER (CVC) WITH SUBCUTANEOUS PORT Right 10/19/2020    Procedure: INSERTION, PORT-A-CATH;  Surgeon: Misti Mcfarland MD;  Location: [facility] OR;  Service: General;  Laterality: Right;    INTRAMEDULLARY RODDING OF TROCHANTER OF FEMUR Right 11/28/2021    Procedure: INSERTION, INTRAMEDULLARY LUC, FEMUR, TROCHANTER/RIGHT TFN DR FAJARDO NOTIFIED REP;  Surgeon: Kp Fajardo MD;  Location: [facility] OR;  Service: Orthopedics;  Laterality: Right;  SKIP    ROBOT-ASSISTED LAPAROSCOPIC LYMPHADENECTOMY USING DA NELLA XI N/A 09/21/2020    Procedure: XI ROBOTIC LYMPHADENECTOMY-pelvic and kell-aortic;  Surgeon: Altagracia Ray MD;  Location: [facility] OR;  Service: OB/GYN;  Laterality: N/A;    ROBOT-ASSISTED LAPAROSCOPIC OMENTECTOMY USING DA NELLA XI N/A 09/21/2020    Procedure: XI ROBOTIC OMENTECTOMY;  Surgeon: Altagracia Ray MD;  Location: [facility] OR;  Service: OB/GYN;  Laterality: N/A;    ROBOT-ASSISTED LAPAROSCOPIC  SALPINGO-OOPHORECTOMY USING DA NELLA XI Bilateral 2020    Procedure: XI ROBOTIC SALPINGO-OOPHORECTOMY;  Surgeon: Altagracia Ray MD;  Location: [facility];  Service: OB/GYN;  Laterality: Bilateral;    THORACOSCOPIC BIOPSY OF PLEURA Right 10/31/2023    Procedure: VATS, WITH PLEURA BIOPSY;  Surgeon: Washington Shankar MD;  Location: [facility] OR;  Service: Cardiothoracic;  Laterality: Right;  FROZEN SECTION   **KRISTEN-ASSISDT**    THORACOTOMY Right 10/31/2023    Procedure: THORACOTOMY;  Surgeon: Washington Shankar MD;  Location: [facility] OR;  Service: Cardiothoracic;  Laterality: Right;    UPPER GASTROINTESTINAL ENDOSCOPY         ALCOHOL, TOBACCO AND DRUG USE  Social History     Tobacco Use   Smoking Status Former    Current packs/day: 0.00    Average packs/day: 1 pack/day for 20.0 years (20.0 ttl pk-yrs)    Types: Cigarettes    Start date:     Quit date:     Years since quittin.9   Smokeless Tobacco Never   Tobacco Comments    quit 40 yrs ago     Social History     Substance and Sexual Activity   Alcohol Use Yes    Alcohol/week: 1.0 standard drink of alcohol    Types: 1 Glasses of wine per week    Comment: occasional     Social History     Substance and Sexual Activity   Drug Use No       FAMILY HISTORY  Family History   Problem Relation Age of Onset    Cancer Mother 57        lung    Cancer Father 56        oral    Skin cancer Sister     Skin cancer Brother     Melanoma Brother     Skin cancer Brother     Breast cancer Maternal Grandmother     Breast cancer Paternal Grandmother     Colon polyps Daughter     Psoriasis Neg Hx     Lupus Neg Hx     Eczema Neg Hx     Colon cancer Neg Hx     Crohn's disease Neg Hx     Esophageal cancer Neg Hx     Stomach cancer Neg Hx     Ulcerative colitis Neg Hx        VITAL SIGNS (MOST RECENT)  Temp: 98.6 °F (37 °C) (23)  Pulse: 82 (23)  Resp: 18 (23)  BP: (!) 129/57 (23)  SpO2: (!) 92 % (23)    INTAKE AND OUTPUT (LAST 24  HOURS):  Intake/Output Summary (Last 24 hours) at 12/11/2023 1020  Last data filed at 12/11/2023 0915  Gross per 24 hour   Intake 350.05 ml   Output --   Net 350.05 ml       WEIGHT  Wt Readings from Last 1 Encounters:   12/10/23 56.5 kg (124 lb 9 oz)       PHYSICAL EXAM  GENERAL: Older patient in no distress.  HEENT: Pupils equal and reactive. Extraocular movements intact. Nose intact. Pharynx moist.  NECK: Supple.   HEART: Regular rate and rhythm. No murmur or gallop auscultated.  LUNGS:  There are dense crackles in both bases.  There are rhonchi over the right anterior thorax.  Lung excursion symmetrical. No change in fremitus.  ABDOMEN: Bowel sounds present. Non-tender, no masses palpated.  : Normal anatomy.  EXTREMITIES: Normal muscle tone and joint movement, no cyanosis or clubbing.   LYMPHATICS: No adenopathy palpated, no edema.  SKIN: Dry, intact, no lesions.   NEURO: Cranial nerves II-XII intact. Motor strength 5/5 bilaterally, upper and lower extremities.  PSYCH: Appropriate affect    ACUTE PHASE REACTANT (LAST 24 HOURS)  Recent Labs   Lab 12/11/23  0413   FERRITIN 134.1   CRP 39.3*       CBC LAST (LAST 24 HOURS)  Recent Labs   Lab 12/11/23  0413   WBC 6.44   RBC 3.09*   HGB 8.8*   HCT 27.9*   MCV 90   MCH 28.5   MCHC 31.5*   RDW 14.7*   *   MPV 8.8*   GRAN 68.1  4.4   LYMPH 16.3*  1.1   MONO 9.6  0.6   BASO 0.05   NRBC 0       CHEMISTRY LAST (LAST 24 HOURS)  Recent Labs   Lab 12/10/23  1105 12/11/23  0413    139   K 3.7 3.6    105   CO2 27 28   ANIONGAP 7* 6*   BUN 26* 20   CREATININE 1.0 0.9   * 106   CALCIUM 8.7 8.4*   MG 1.7  --    ALBUMIN 3.0* 2.8*   PROT 6.2 5.7*   ALKPHOS 40* 36*   ALT 25 19   AST 16 11   BILITOT 0.3 0.2         CARDIAC PROFILE (LAST 24 HOURS)  Recent Labs   Lab 12/05/23  0527 12/10/23  1105   BNP  --  201*     --    TROPONINIHS  --  9.7       LAST 7 DAYS MICROBIOLOGY   Microbiology Results (last 7 days)       Procedure Component Value Units  Date/Time    Blood culture #1 **CANNOT BE ORDERED STAT** [[id number]] Collected: 12/10/23 1506    Order Status: Completed Specimen: Blood from Antecubital, Right Updated: 12/10/23 2158     Blood Culture, Routine No Growth to date    Blood culture #2 **CANNOT BE ORDERED STAT** [[id number]] Collected: 12/10/23 1458    Order Status: Completed Specimen: Blood Updated: 12/10/23 2158     Blood Culture, Routine No Growth to date            MOST RECENT IMAGING  CT Chest Abdomen Pelvis With IV Contrast (XPD) NO Oral Contrast   ADDENDUM #1     This is a correction to the impression    Findings are compatible with multifocal pneumonia.    Electronically signed by:  Chichi Gann MD  12/10/2023 01:42 PM CST Workstation: [id number]     ORIGINAL REPORT     CMS MANDATED QUALITY DATA - CT RADIATION - 436    All CT scans at this facility utilize dose modulation, iterative reconstruction, and/or weight based dosing when appropriate to reduce radiation dose to as low as reasonably achievable.    Reason: Upper Abdominal pain , lung cancer, right thoracotomy    TECHNIQUE: CT thorax, abdomen, and pelvis with 100 mL Omnipaque 350.    COMPARISON: CTA chest dated 12/4/2023 PET/CT dated 8/15/2023    CT THORAX:  Base of the neck is unremarkable. There are stable tiny thyroid nodules. There is no supraclavicular adenopathy.  There is a right IJ Port-A-Cath with the tip in the superior vena cava.    LUNGS: Progression of the alveolar consolidation in the right mid and lower lobe with moderate size right pleural effusion. There is progression of the groundglass and interstitial opacities in the right upper lobe.  Patchy groundglass opacities in the lateral left upper lobe and posterior left lower lobe.  There are mild emphysematous changes.    Heart is normal in size. There is multivessel coronary artery calcification. Aorta is normal in caliber.  Mediastinum: There is no pathologic adenopathy. There are mildly prominent mediastinal lymph  nodes which may be reactive.    There are bilateral rim calcified breast implants. The musculature is normal.  There are degenerative changes of the spine. There are no lytic or blastic lesions.    CT ABDOMEN:  Liver is normal in size and contour. The liver is hypodense compatible with fatty infiltration.  There is a stable 13 mm low-density lesion within the anterior segment of the right lobe of the liver. There is a 4 cm cyst within the posterior segment of the right lobe of the liver.  There is no biliary duct dilatation. The gallbladder is absent.    Spleen is normal in size and contour    Pancreas demonstrate normal enhancement. There is no focal mass.    There are no thick-walled or dilated bowel loops. There is colonic diverticulosis without diverticulitis.  There is no mesenteric or retroperitoneal adenopathy.  There is a stable 3.1 cm rim calcified mass in the right upper quadrant  The aorta is normal in caliber. There is moderate vascular calcification.    There is no free fluid or free air.  The musculature is normal.  There is rightward curvature of the lumbar spine. There are degenerative changes of the spine without lytic or blastic lesions.    CT PELVIS:  The bladder is normal. There is colonic diverticulosis. There is no pelvic adenopathy. There is hardware within the right hip.    IMPRESSION:  Progression of the alveolar consolidation in the right mid and lower lung with moderate size right pleural effusion. There is progression of the airspace disease within the right upper lobe with patchy groundglass opacity left upper lobe and left lung base. Findings are compatible with multifocal.    Moderate vascular calcification of the coronary arteries and aorta    Fatty infiltration of the liver with stable hypodense lesions within the liver compatible with cysts    Colonic diverticulosis without diverticulitis    Prior cholecystectomy and hysterectomy    Stable rim calcified mass in the right upper  quadrant    Degenerative changes of the spine    Electronically signed by:  Chichi Gann MD  12/10/2023 01:29 PM CST Workstation: [id number]  X-Ray Chest AP Portable  Portable chest x-ray 11:06 AM is compared to prior study dated 12/6/2023    Clinical history is cough and shortness of breath    There is a right IJ Port-A-Cath in stable position.  The cardiomediastinal silhouette is stable.  There is progression of the alveolar consolidation in the right mid and lower lung with small right pleural effusion. There are stable interstitial and groundglass opacity in the right upper lobe.  The left lung is clear.  There are bilateral breast implants. There are no acute osseous abnormalities.    IMPRESSION: Progression of the alveolar consolidation in the right mid and lower lung with small right pleural effusion.  There are groundglass and interstitial opacities in the right upper lobe    Electronically signed by:  Chichi Gann MD  12/10/2023 11:54 AM CST Workstation: [id number]      CURRENT VISIT EKG  Results for orders placed or performed during the hospital encounter of 12/10/23   EKG 12-lead    Narrative    Test Reason : R06.02,    Vent. Rate : 095 BPM     Atrial Rate : 095 BPM     P-R Int : 128 ms          QRS Dur : 064 ms      QT Int : 376 ms       P-R-T Axes : 060 041 088 degrees     QTc Int : 472 ms    Sinus rhythm with Premature supraventricular complexes  Anteroseptal infarct ,age undetermined  Abnormal ECG  When compared with ECG of 03-DEC-2023 18:38,  Premature supraventricular complexes are now Present  Anteroseptal infarct is now Present  Confirmed by Luis Eduardo LAWSON, Narciso SAMANIEGO ([id number]) on 12/10/2023 9:37:25 PM    Referred By: [email or web address]   SELF           Confirmed By:Narciso Hoff MD       ECHOCARDIOGRAM RESULTS  Results for orders placed during the hospital encounter of 10/13/23    Echo    Interpretation Summary    Left Ventricle: The left ventricle is normal in size. Normal wall thickness. Normal wall  "motion. There is normal systolic function with a visually estimated ejection fraction of 55 - 60%. Grade I diastolic dysfunction.    Right Ventricle: Normal right ventricular cavity size. Wall thickness is normal. Right ventricle wall motion  is normal. Systolic function is normal.    Aortic Valve: There is moderate aortic valve sclerosis.    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 There is mild mitral annular calcification present. There is no stenosis. The mean pressure gradient across the mitral valve is 3 mmHg at a heart rate of  bpm. There is mild regurgitation.    Tricuspid Valve: There is mild regurgitation.  No pulmonary hypertension.    IVC/SVC: Normal venous pressure at 3 mmHg.        The patient is on room air              LAST ARTERIAL BLOOD GAS  ABG  No results for input(s): "PH", "PO2", "PCO2", "HCO3", "BE" in the last 168 hours.    IMPRESSION AND PLAN  Progression of right upper lobe right middle lobe left lower lobe and left upper lobe infiltrates.  The right middle lobe is densely consolidated.  There is a moderate right pleural effusion.  There is more left lower lobe infiltrate.  I am very concerned that this could be her pneumonic adenocarcinoma as she is bringing up handfuls of clear mucus which is what she did before her resection.  However it is very soon for this to be progressing this fast.  Abdominal discomfort  Anemia, iron deficiency, worse with hydration    Will continue Rocephin and azithromycin.  Will do a bronchoscopy in the morning with BAL as she is on antiplatelet therapy for her CABG.  Will plan to proceed to transbronchial biopsies if we do not get an answer on the BAL.  GI to evaluate her abdominal discomfort    Neva Christian MD  Date of Service: 12/11/2023  10:20 AM   "

## 2023-12-11 NOTE — ASSESSMENT & PLAN NOTE
Patient was recently treated for multifocal pneumonia and pleural effusion status post thoracentesis.  Patient was discharged home on December 6 with cefdinir and azithromycin.  Patient was unable to  the antibiotics from pharmacy.  CT shows progression of airspace disease and moderate right pleural effusion.  -pulm consulted   -bronch w/BAL on 12/12  -ctx/amyro

## 2023-12-12 ENCOUNTER — ANESTHESIA (OUTPATIENT)
Dept: SURGERY | Facility: HOSPITAL | Age: 83
DRG: 392 | End: 2023-12-12
Payer: MEDICARE

## 2023-12-12 PROBLEM — J18.9 MULTIFOCAL PNEUMONIA: Status: ACTIVE | Noted: 2023-12-12

## 2023-12-12 PROBLEM — D50.0 IRON DEFICIENCY ANEMIA DUE TO CHRONIC BLOOD LOSS: Status: ACTIVE | Noted: 2023-12-12

## 2023-12-12 LAB
ALBUMIN SERPL BCP-MCNC: 2.8 G/DL (ref 3.5–5.2)
ALP SERPL-CCNC: 37 U/L (ref 55–135)
ALT SERPL W/O P-5'-P-CCNC: 15 U/L (ref 10–44)
ANION GAP SERPL CALC-SCNC: 6 MMOL/L (ref 8–16)
AST SERPL-CCNC: 10 U/L (ref 10–40)
BASOPHILS # BLD AUTO: 0.03 K/UL (ref 0–0.2)
BASOPHILS NFR BLD: 0.4 % (ref 0–1.9)
BILIRUB SERPL-MCNC: 0.2 MG/DL (ref 0.1–1)
BUN SERPL-MCNC: 20 MG/DL (ref 8–23)
CALCIUM SERPL-MCNC: 8.3 MG/DL (ref 8.7–10.5)
CHLORIDE SERPL-SCNC: 106 MMOL/L (ref 95–110)
CO2 SERPL-SCNC: 28 MMOL/L (ref 23–29)
CREAT SERPL-MCNC: 1 MG/DL (ref 0.5–1.4)
DIFFERENTIAL METHOD BLD: ABNORMAL
EOSINOPHIL # BLD AUTO: 0.2 K/UL (ref 0–0.5)
EOSINOPHIL NFR BLD: 3.4 % (ref 0–8)
ERYTHROCYTE [DISTWIDTH] IN BLOOD BY AUTOMATED COUNT: 14.9 % (ref 11.5–14.5)
EST. GFR  (NO RACE VARIABLE): 56.2 ML/MIN/1.73 M^2
GLUCOSE SERPL-MCNC: 120 MG/DL (ref 70–110)
HCT VFR BLD AUTO: 28.7 % (ref 37–48.5)
HGB BLD-MCNC: 8.9 G/DL (ref 12–16)
IMM GRANULOCYTES # BLD AUTO: 0.04 K/UL (ref 0–0.04)
IMM GRANULOCYTES NFR BLD AUTO: 0.6 % (ref 0–0.5)
KOH PREP SPEC: NORMAL
LYMPHOCYTES # BLD AUTO: 1.2 K/UL (ref 1–4.8)
LYMPHOCYTES NFR BLD: 17.2 % (ref 18–48)
MCH RBC QN AUTO: 28.3 PG (ref 27–31)
MCHC RBC AUTO-ENTMCNC: 31 G/DL (ref 32–36)
MCV RBC AUTO: 91 FL (ref 82–98)
MONOCYTES # BLD AUTO: 0.7 K/UL (ref 0.3–1)
MONOCYTES NFR BLD: 9.5 % (ref 4–15)
NEUTROPHILS # BLD AUTO: 4.9 K/UL (ref 1.8–7.7)
NEUTROPHILS NFR BLD: 68.9 % (ref 38–73)
NRBC BLD-RTO: 0 /100 WBC
PLATELET # BLD AUTO: 442 K/UL (ref 150–450)
PMV BLD AUTO: 8.9 FL (ref 9.2–12.9)
POTASSIUM SERPL-SCNC: 4 MMOL/L (ref 3.5–5.1)
PROT SERPL-MCNC: 5.5 G/DL (ref 6–8.4)
RBC # BLD AUTO: 3.14 M/UL (ref 4–5.4)
SODIUM SERPL-SCNC: 140 MMOL/L (ref 136–145)
WBC # BLD AUTO: 7.14 K/UL (ref 3.9–12.7)

## 2023-12-12 PROCEDURE — 25000003 PHARM REV CODE 250: Performed by: STUDENT IN AN ORGANIZED HEALTH CARE EDUCATION/TRAINING PROGRAM

## 2023-12-12 PROCEDURE — 36415 COLL VENOUS BLD VENIPUNCTURE: CPT | Performed by: STUDENT IN AN ORGANIZED HEALTH CARE EDUCATION/TRAINING PROGRAM

## 2023-12-12 PROCEDURE — 87206 SMEAR FLUORESCENT/ACID STAI: CPT | Performed by: INTERNAL MEDICINE

## 2023-12-12 PROCEDURE — 99232 SBSQ HOSP IP/OBS MODERATE 35: CPT | Mod: 25,,, | Performed by: INTERNAL MEDICINE

## 2023-12-12 PROCEDURE — 94761 N-INVAS EAR/PLS OXIMETRY MLT: CPT

## 2023-12-12 PROCEDURE — D9220A PRA ANESTHESIA: Mod: CRNA,,, | Performed by: NURSE ANESTHETIST, CERTIFIED REGISTERED

## 2023-12-12 PROCEDURE — 87070 CULTURE OTHR SPECIMN AEROBIC: CPT | Performed by: INTERNAL MEDICINE

## 2023-12-12 PROCEDURE — 37000009 HC ANESTHESIA EA ADD 15 MINS: Performed by: INTERNAL MEDICINE

## 2023-12-12 PROCEDURE — 87210 SMEAR WET MOUNT SALINE/INK: CPT | Performed by: INTERNAL MEDICINE

## 2023-12-12 PROCEDURE — 37000008 HC ANESTHESIA 1ST 15 MINUTES: Performed by: INTERNAL MEDICINE

## 2023-12-12 PROCEDURE — 87116 MYCOBACTERIA CULTURE: CPT | Performed by: INTERNAL MEDICINE

## 2023-12-12 PROCEDURE — 12000002 HC ACUTE/MED SURGE SEMI-PRIVATE ROOM

## 2023-12-12 PROCEDURE — 25000242 PHARM REV CODE 250 ALT 637 W/ HCPCS: Performed by: STUDENT IN AN ORGANIZED HEALTH CARE EDUCATION/TRAINING PROGRAM

## 2023-12-12 PROCEDURE — 27000221 HC OXYGEN, UP TO 24 HOURS

## 2023-12-12 PROCEDURE — 99900035 HC TECH TIME PER 15 MIN (STAT)

## 2023-12-12 PROCEDURE — 25000003 PHARM REV CODE 250: Performed by: INTERNAL MEDICINE

## 2023-12-12 PROCEDURE — D9220A PRA ANESTHESIA: ICD-10-PCS | Mod: CRNA,,, | Performed by: NURSE ANESTHETIST, CERTIFIED REGISTERED

## 2023-12-12 PROCEDURE — A4216 STERILE WATER/SALINE, 10 ML: HCPCS | Performed by: STUDENT IN AN ORGANIZED HEALTH CARE EDUCATION/TRAINING PROGRAM

## 2023-12-12 PROCEDURE — 85025 COMPLETE CBC W/AUTO DIFF WBC: CPT | Performed by: STUDENT IN AN ORGANIZED HEALTH CARE EDUCATION/TRAINING PROGRAM

## 2023-12-12 PROCEDURE — 80053 COMPREHEN METABOLIC PANEL: CPT | Performed by: STUDENT IN AN ORGANIZED HEALTH CARE EDUCATION/TRAINING PROGRAM

## 2023-12-12 PROCEDURE — 31624 DX BRONCHOSCOPE/LAVAGE: CPT | Mod: ,,, | Performed by: INTERNAL MEDICINE

## 2023-12-12 PROCEDURE — 88305 TISSUE EXAM BY PATHOLOGIST: CPT | Mod: TC | Performed by: PATHOLOGY

## 2023-12-12 PROCEDURE — 63600175 PHARM REV CODE 636 W HCPCS: Performed by: NURSE ANESTHETIST, CERTIFIED REGISTERED

## 2023-12-12 PROCEDURE — 63600175 PHARM REV CODE 636 W HCPCS: Performed by: STUDENT IN AN ORGANIZED HEALTH CARE EDUCATION/TRAINING PROGRAM

## 2023-12-12 PROCEDURE — D9220A PRA ANESTHESIA: Mod: ANES,,, | Performed by: STUDENT IN AN ORGANIZED HEALTH CARE EDUCATION/TRAINING PROGRAM

## 2023-12-12 PROCEDURE — 31624 DX BRONCHOSCOPE/LAVAGE: CPT | Performed by: INTERNAL MEDICINE

## 2023-12-12 PROCEDURE — 87102 FUNGUS ISOLATION CULTURE: CPT | Performed by: INTERNAL MEDICINE

## 2023-12-12 PROCEDURE — D9220A PRA ANESTHESIA: ICD-10-PCS | Mod: ANES,,, | Performed by: STUDENT IN AN ORGANIZED HEALTH CARE EDUCATION/TRAINING PROGRAM

## 2023-12-12 PROCEDURE — 0B9D8ZX DRAINAGE OF RIGHT MIDDLE LUNG LOBE, VIA NATURAL OR ARTIFICIAL OPENING ENDOSCOPIC, DIAGNOSTIC: ICD-10-PCS | Performed by: INTERNAL MEDICINE

## 2023-12-12 PROCEDURE — 87205 SMEAR GRAM STAIN: CPT | Performed by: INTERNAL MEDICINE

## 2023-12-12 PROCEDURE — 27201114 HC TRAP (ANY): Performed by: INTERNAL MEDICINE

## 2023-12-12 RX ORDER — SODIUM CHLORIDE, SODIUM LACTATE, POTASSIUM CHLORIDE, CALCIUM CHLORIDE 600; 310; 30; 20 MG/100ML; MG/100ML; MG/100ML; MG/100ML
INJECTION, SOLUTION INTRAVENOUS CONTINUOUS PRN
Status: DISCONTINUED | OUTPATIENT
Start: 2023-12-12 | End: 2023-12-12

## 2023-12-12 RX ORDER — PROPOFOL 10 MG/ML
INJECTION, EMULSION INTRAVENOUS
Status: DISCONTINUED | OUTPATIENT
Start: 2023-12-12 | End: 2023-12-12

## 2023-12-12 RX ORDER — MUPIROCIN 20 MG/G
OINTMENT TOPICAL 2 TIMES DAILY
Status: DISCONTINUED | OUTPATIENT
Start: 2023-12-12 | End: 2023-12-13 | Stop reason: HOSPADM

## 2023-12-12 RX ORDER — LIDOCAINE HYDROCHLORIDE 40 MG/ML
10 SOLUTION TOPICAL ONCE
Status: DISCONTINUED | OUTPATIENT
Start: 2023-12-12 | End: 2023-12-12

## 2023-12-12 RX ORDER — LIDOCAINE HYDROCHLORIDE 20 MG/ML
JELLY TOPICAL
Status: DISCONTINUED | OUTPATIENT
Start: 2023-12-12 | End: 2023-12-13 | Stop reason: HOSPADM

## 2023-12-12 RX ORDER — ALBUTEROL SULFATE 0.83 MG/ML
2.5 SOLUTION RESPIRATORY (INHALATION) ONCE
Status: DISCONTINUED | OUTPATIENT
Start: 2023-12-12 | End: 2023-12-13

## 2023-12-12 RX ORDER — LIDOCAINE HYDROCHLORIDE 40 MG/ML
SOLUTION TOPICAL
Status: DISCONTINUED | OUTPATIENT
Start: 2023-12-12 | End: 2023-12-12 | Stop reason: HOSPADM

## 2023-12-12 RX ORDER — LIDOCAINE HYDROCHLORIDE 20 MG/ML
10 SOLUTION OROPHARYNGEAL ONCE
Status: DISCONTINUED | OUTPATIENT
Start: 2023-12-12 | End: 2023-12-13

## 2023-12-12 RX ORDER — LIDOCAINE HYDROCHLORIDE 10 MG/ML
20 INJECTION, SOLUTION EPIDURAL; INFILTRATION; INTRACAUDAL; PERINEURAL ONCE
Status: DISCONTINUED | OUTPATIENT
Start: 2023-12-12 | End: 2023-12-13

## 2023-12-12 RX ADMIN — APIXABAN 2.5 MG: 2.5 TABLET, FILM COATED ORAL at 03:12

## 2023-12-12 RX ADMIN — SIMETHICONE 80 MG: 80 TABLET, CHEWABLE ORAL at 07:12

## 2023-12-12 RX ADMIN — SIMETHICONE 80 MG: 80 TABLET, CHEWABLE ORAL at 03:12

## 2023-12-12 RX ADMIN — GABAPENTIN 100 MG: 100 CAPSULE ORAL at 09:12

## 2023-12-12 RX ADMIN — ACETAMINOPHEN 650 MG: 325 TABLET ORAL at 10:12

## 2023-12-12 RX ADMIN — SODIUM CHLORIDE, SODIUM LACTATE, POTASSIUM CHLORIDE, AND CALCIUM CHLORIDE: .6; .31; .03; .02 INJECTION, SOLUTION INTRAVENOUS at 09:12

## 2023-12-12 RX ADMIN — CEFTRIAXONE SODIUM 1 G: 1 INJECTION, POWDER, FOR SOLUTION INTRAMUSCULAR; INTRAVENOUS at 06:12

## 2023-12-12 RX ADMIN — MUPIROCIN 1 G: 20 OINTMENT TOPICAL at 09:12

## 2023-12-12 RX ADMIN — PROPOFOL 50 MG: 10 INJECTION, EMULSION INTRAVENOUS at 09:12

## 2023-12-12 RX ADMIN — SODIUM CHLORIDE, PRESERVATIVE FREE 10 ML: 5 INJECTION INTRAVENOUS at 09:12

## 2023-12-12 RX ADMIN — APIXABAN 2.5 MG: 2.5 TABLET, FILM COATED ORAL at 09:12

## 2023-12-12 RX ADMIN — Medication 6 MG: at 09:12

## 2023-12-12 NOTE — SUBJECTIVE & OBJECTIVE
Interval History: Patient underwent bronch this AM. Denies any complications from bronch. Denies any SOB or chest pain this AM.     Review of Systems   All other systems reviewed and are negative.    Objective:     Vital Signs (Most Recent):  Temp: 98.2 °F (36.8 °C) (12/12/23 1230)  Pulse: 81 (12/12/23 1230)  Resp: 18 (12/12/23 1230)  BP: (!) 158/71 (12/12/23 1230)  SpO2: 97 % (12/12/23 1230) Vital Signs (24h Range):  Temp:  [97.4 °F (36.3 °C)-99.3 °F (37.4 °C)] 98.2 °F (36.8 °C)  Pulse:  [74-94] 81  Resp:  [12-19] 18  SpO2:  [94 %-100 %] 97 %  BP: (121-173)/(58-82) 158/71     Weight: 54.4 kg (120 lb)  Body mass index is 21.26 kg/m².    Intake/Output Summary (Last 24 hours) at 12/12/2023 1615  Last data filed at 12/12/2023 0958  Gross per 24 hour   Intake 100 ml   Output --   Net 100 ml           Physical Exam  Constitutional:       Appearance: Normal appearance.   HENT:      Head: Normocephalic and atraumatic.      Right Ear: External ear normal.      Left Ear: External ear normal.      Nose: Nose normal.      Mouth/Throat:      Mouth: Mucous membranes are moist.   Eyes:      Extraocular Movements: Extraocular movements intact.   Cardiovascular:      Rate and Rhythm: Normal rate and regular rhythm.      Heart sounds: No murmur heard.  Pulmonary:      Effort: No respiratory distress.      Breath sounds: Rhonchi present. No wheezing.      Comments: NC in place but oxygen turned off  Abdominal:      General: Abdomen is flat. Bowel sounds are normal. There is no distension.   Musculoskeletal:      Right lower leg: No edema.      Left lower leg: No edema.   Skin:     General: Skin is warm.   Neurological:      General: No focal deficit present.      Mental Status: She is alert and oriented to person, place, and time.   Psychiatric:         Mood and Affect: Mood normal.         Behavior: Behavior normal.             Significant Labs: All pertinent labs within the past 24 hours have been reviewed.    Significant Imaging:  I have reviewed all pertinent imaging results/findings within the past 24 hours.

## 2023-12-12 NOTE — PROGRESS NOTES
Pulmonary/Critical Care Consult      PATIENT NAME: Alexa Otero  MRN: [id number]  TODAY'S DATE: 2023  10:20 AM  ADMIT DATE: 12/10/2023  AGE: 83 y.o. 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  She developed a right middle lobe right upper lobe pneumonic infiltrate with parapneumonic effusion last month.  She was treated with 4 days of antibiotics in the hospital and then did not get her 5th day as an outpatient.  The CT shows significant progression of the right middle lobe process with increasing infiltrate in the left lower lobe and 2 cavitary nodular areas in the left upper lobe that are fairly unchanged from November but were not there in August.  She also has a moderate fusion again.  This was a parapneumonic effusion last month.  The patient is coughing up a handful of clear mucus every hour.  This is what she was coughing up before her right lower lobe was resected.  I am very concerned that this is more of her adenocarcinoma.  The patient had recent cardiac stents placed right before her right lower lobe resection and is on dual antiplatelet therapy.  The patient does not appear toxic.  She was admitted to the hospital with abdominal pain.     The patient states she is still expectorating a handful of clear mucus hourly.  She underwent bronchoscopy which had no inflammation, mucus, or edema.   I am desperately worried that this is her cancer not a pneumonia.  I am holding her Plavix and using the regimen Dr. Shankar did for her right lower lobe resection for anticoagulation and will plan on Friday to do transbronchial biopsies.  Even this may not be revealing as her adenocarcinoma is so well differentiated.    REVIEW OF SYSTEMS  GENERAL: Feeling tired  EYES: Vision is good.  ENT: No sinusitis or pharyngitis.   HEART: No  chest pain or palpitations.  LUNGS:  She is expectorating cupfuls of clear mucus a day.  GI:  She feels like she has gas but is not distended.  She has been nauseated.  : No dysuria, hesitancy, or nocturia.  SKIN: No lesions or rashes.  MUSCULOSKELETAL: No joint pain or myalgias.  NEURO: No headaches or neuropathy.  LYMPH: No edema or adenopathy.  PSYCH: No anxiety or depression.  ENDO: No weight change.    No change in the patient's Past Medical History, Past Surgical History, Social History or Family History since admission.    VITAL SIGNS (MOST RECENT)  Temp: 99.3 °F (37.4 °C) (12/12/23 1005)  Pulse: 84 (12/12/23 1005)  Resp: 18 (12/12/23 1005)  BP: (!) 129/59 (12/12/23 1005)  SpO2: 100 % (12/12/23 1005)    INTAKE AND OUTPUT (LAST 24 HOURS):  Intake/Output Summary (Last 24 hours) at 12/12/2023 1014  Last data filed at 12/12/2023 0958  Gross per 24 hour   Intake 300 ml   Output --   Net 300 ml       WEIGHT  Wt Readings from Last 1 Encounters:   12/12/23 54.4 kg (120 lb)       PHYSICAL EXAM  GENERAL: Older patient in no distress.  HEENT: Pupils equal and reactive. Extraocular movements intact. Nose intact. Pharynx moist.  NECK: Supple.   HEART: Regular rate and rhythm. No murmur or gallop auscultated.  LUNGS:  There are dense crackles in both bases.  There are rhonchi over the right anterior thorax.  Lung excursion symmetrical. No change in fremitus.  ABDOMEN: Bowel sounds present. Non-tender, no masses palpated.  : Normal anatomy.  EXTREMITIES: Normal muscle tone and joint movement, no cyanosis or clubbing.   LYMPHATICS: No adenopathy palpated, no edema.  SKIN: Dry, intact, no lesions.   NEURO: Cranial nerves II-XII intact. Motor strength 5/5 bilaterally, upper and lower extremities.  PSYCH: Appropriate affect        CBC LAST (LAST 24 HOURS)  Recent Labs   Lab 12/12/23  0418   WBC 7.14   RBC 3.14*   HGB 8.9*   HCT 28.7*   MCV 91   MCH 28.3   MCHC 31.0*   RDW 14.9*      MPV 8.9*   GRAN 68.9  4.9   LYMPH  17.2*  1.2   MONO 9.5  0.7   BASO 0.03   NRBC 0       CHEMISTRY LAST (LAST 24 HOURS)  Recent Labs   Lab 12/12/23  0418      K 4.0      CO2 28   ANIONGAP 6*   BUN 20   CREATININE 1.0   *   CALCIUM 8.3*   ALBUMIN 2.8*   PROT 5.5*   ALKPHOS 37*   ALT 15   AST 10   BILITOT 0.2         CARDIAC PROFILE (LAST 24 HOURS)  Recent Labs   Lab 12/10/23  1105   *   TROPONINIHS 9.7       LAST 7 DAYS MICROBIOLOGY   Microbiology Results (last 7 days)       Procedure Component Value Units Date/Time    Culture, Respiratory [[id number]]     Order Status: No result Specimen: Respiratory from Bronchial Wash     Gram stain [[id number]]     Order Status: No result Specimen: Body Fluid     AFB Culture & Smear [[id number]]     Order Status: No result Specimen: Bronchial Alveolar Lavage from Bronchial Wash     Fungus culture [[id number]]     Order Status: No result Specimen: Bronchial Alveolar Lavage from Bronchial Wash     JUSTICE prep [[id number]]     Order Status: No result Specimen: Bronchial Alveolar Lavage from Bronchial Wash     Culture, Respiratory with Gram Stain [[id number]] Collected: 12/11/23 1118    Order Status: Completed Specimen: Respiratory from Sputum Updated: 12/12/23 0747     Respiratory Culture Normal respiratory cece     Gram Stain (Respiratory) <10 epithelial cells per low power field.     Gram Stain (Respiratory) Rare WBC's     Gram Stain (Respiratory) Rare Gram positive cocci     Gram Stain (Respiratory) Rare Gram negative rods     Gram Stain (Respiratory) Rare budding yeast     Comment: Previous comment was modified by [facility] at 07:47 on 12/12/2023 <10   epithelial cells per low power field.No WBC's or organisms seen         Blood culture #1 **CANNOT BE ORDERED STAT** [[id number]] Collected: 12/10/23 1506    Order Status: Completed Specimen: Blood from Antecubital, Right Updated: 12/11/23 1632     Blood Culture, Routine No Growth to date      No Growth to date    Blood culture #2 **CANNOT BE  ORDERED STAT** [[id number]] Collected: 12/10/23 1458    Order Status: Completed Specimen: Blood Updated: 12/11/23 1632     Blood Culture, Routine No Growth to date      No Growth to date            MOST RECENT IMAGING  CT Chest Abdomen Pelvis With IV Contrast (XPD) NO Oral Contrast   ADDENDUM #1     This is a correction to the impression    Findings are compatible with multifocal pneumonia.    Electronically signed by:  Chichi Gann MD  12/10/2023 01:42 PM CST Workstation: [id number]     ORIGINAL REPORT     CMS MANDATED QUALITY DATA - CT RADIATION - 436    All CT scans at this facility utilize dose modulation, iterative reconstruction, and/or weight based dosing when appropriate to reduce radiation dose to as low as reasonably achievable.    Reason: Upper Abdominal pain , lung cancer, right thoracotomy    TECHNIQUE: CT thorax, abdomen, and pelvis with 100 mL Omnipaque 350.    COMPARISON: CTA chest dated 12/4/2023 PET/CT dated 8/15/2023    CT THORAX:  Base of the neck is unremarkable. There are stable tiny thyroid nodules. There is no supraclavicular adenopathy.  There is a right IJ Port-A-Cath with the tip in the superior vena cava.    LUNGS: Progression of the alveolar consolidation in the right mid and lower lobe with moderate size right pleural effusion. There is progression of the groundglass and interstitial opacities in the right upper lobe.  Patchy groundglass opacities in the lateral left upper lobe and posterior left lower lobe.  There are mild emphysematous changes.    Heart is normal in size. There is multivessel coronary artery calcification. Aorta is normal in caliber.  Mediastinum: There is no pathologic adenopathy. There are mildly prominent mediastinal lymph nodes which may be reactive.    There are bilateral rim calcified breast implants. The musculature is normal.  There are degenerative changes of the spine. There are no lytic or blastic lesions.    CT ABDOMEN:  Liver is normal in size and  contour. The liver is hypodense compatible with fatty infiltration.  There is a stable 13 mm low-density lesion within the anterior segment of the right lobe of the liver. There is a 4 cm cyst within the posterior segment of the right lobe of the liver.  There is no biliary duct dilatation. The gallbladder is absent.    Spleen is normal in size and contour    Pancreas demonstrate normal enhancement. There is no focal mass.    There are no thick-walled or dilated bowel loops. There is colonic diverticulosis without diverticulitis.  There is no mesenteric or retroperitoneal adenopathy.  There is a stable 3.1 cm rim calcified mass in the right upper quadrant  The aorta is normal in caliber. There is moderate vascular calcification.    There is no free fluid or free air.  The musculature is normal.  There is rightward curvature of the lumbar spine. There are degenerative changes of the spine without lytic or blastic lesions.    CT PELVIS:  The bladder is normal. There is colonic diverticulosis. There is no pelvic adenopathy. There is hardware within the right hip.    IMPRESSION:  Progression of the alveolar consolidation in the right mid and lower lung with moderate size right pleural effusion. There is progression of the airspace disease within the right upper lobe with patchy groundglass opacity left upper lobe and left lung base. Findings are compatible with multifocal.    Moderate vascular calcification of the coronary arteries and aorta    Fatty infiltration of the liver with stable hypodense lesions within the liver compatible with cysts    Colonic diverticulosis without diverticulitis    Prior cholecystectomy and hysterectomy    Stable rim calcified mass in the right upper quadrant    Degenerative changes of the spine    Electronically signed by:  Chichi Gann MD  12/10/2023 01:29 PM CST Workstation: [id number]  X-Ray Chest AP Portable  Portable chest x-ray 11:06 AM is compared to prior study dated  12/6/2023    Clinical history is cough and shortness of breath    There is a right IJ Port-A-Cath in stable position.  The cardiomediastinal silhouette is stable.  There is progression of the alveolar consolidation in the right mid and lower lung with small right pleural effusion. There are stable interstitial and groundglass opacity in the right upper lobe.  The left lung is clear.  There are bilateral breast implants. There are no acute osseous abnormalities.    IMPRESSION: Progression of the alveolar consolidation in the right mid and lower lung with small right pleural effusion.  There are groundglass and interstitial opacities in the right upper lobe    Electronically signed by:  Chichi Gann MD  12/10/2023 11:54 AM CST Workstation: [id number]      CURRENT VISIT EKG  Results for orders placed or performed during the hospital encounter of 12/10/23   EKG 12-lead    Narrative    Test Reason : R06.02,    Vent. Rate : 095 BPM     Atrial Rate : 095 BPM     P-R Int : 128 ms          QRS Dur : 064 ms      QT Int : 376 ms       P-R-T Axes : 060 041 088 degrees     QTc Int : 472 ms    Sinus rhythm with Premature supraventricular complexes  Anteroseptal infarct ,age undetermined  Abnormal ECG  When compared with ECG of 03-DEC-2023 18:38,  Premature supraventricular complexes are now Present  Anteroseptal infarct is now Present  Confirmed by Luis Eduardo LAWSON, Narciso SAMANIEGO ([id number]) on 12/10/2023 9:37:25 PM    Referred By: [email or web address]   SELF           Confirmed By:Narciso Hoff MD       ECHOCARDIOGRAM RESULTS  Results for orders placed during the hospital encounter of 10/13/23    Echo    Interpretation Summary    Left Ventricle: The left ventricle is normal in size. Normal wall thickness. Normal wall motion. There is normal systolic function with a visually estimated ejection fraction of 55 - 60%. Grade I diastolic dysfunction.    Right Ventricle: Normal right ventricular cavity size. Wall thickness is normal. Right ventricle wall  "motion  is normal. Systolic function is normal.    Aortic Valve: There is moderate aortic valve sclerosis.    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 There is mild mitral annular calcification present. There is no stenosis. The mean pressure gradient across the mitral valve is 3 mmHg at a heart rate of  bpm. There is mild regurgitation.    Tricuspid Valve: There is mild regurgitation.  No pulmonary hypertension.    IVC/SVC: Normal venous pressure at 3 mmHg.        The patient is on room air              LAST ARTERIAL BLOOD GAS  ABG  No results for input(s): "PH", "PO2", "PCO2", "HCO3", "BE" in the last 168 hours.    IMPRESSION AND PLAN  Progression of right upper lobe right middle lobe left lower lobe and left upper lobe infiltrates.  The right middle lobe is densely consolidated.  There is a moderate right pleural effusion.  There is more left lower lobe infiltrate.  I am very concerned that this could be her pneumonic adenocarcinoma as she is bringing up handfuls of clear mucus which is what she did before her resection.  However it is very soon for this to be progressing this fast.  Abdominal discomfort, negative EGD  Anemia, iron deficiency, worse with hydration   Anticoagulated with Plavix and aspirin following stents and cardiopulmonary arrest.  Discussed with Dr. Shankar his regimen for her lobectomy.  He did 2 days of 2.5 mg Eliquis and then 2 days of full-dose Lovenox and then 1 day of nothing and then surgery and then restarted her antiplatelet regimen.  Will do this for her transbronchial biopsies.  Her last dose of Plavix was on the 9th.    Will continue Rocephin and azithromycin.  Will do a bronchoscopy in the morning with BAL as she is on antiplatelet therapy for her CABG.  Will plan to proceed to transbronchial biopsies on Friday    Neva Christian MD  Date of Service: 12/12/2023  10:20 AM   "

## 2023-12-12 NOTE — TRANSFER OF CARE
"Anesthesia Transfer of Care Note    Patient: Alexa Otero    Procedure(s) Performed: Procedure(s) (LRB):  BRONCHOSCOPY (N/A)    Patient location: GI    Anesthesia Type: general    Transport from OR: Transported from OR on 2-3 L/min O2 by NC with adequate spontaneous ventilation    Post pain: adequate analgesia    Post assessment: no apparent anesthetic complications and tolerated procedure well    Post vital signs: stable    Level of consciousness: awake    Nausea/Vomiting: no nausea/vomiting    Complications: none    Transfer of care protocol was followed      Last vitals: Visit Vitals  BP (!) 161/69   Pulse 80   Temp 37.1 °C (98.7 °F)   Resp 16   Ht 5' 3" (1.6 m)   Wt 54.4 kg (120 lb)   SpO2 (!) 94%   Breastfeeding No   BMI 21.26 kg/m²     "

## 2023-12-12 NOTE — NURSING
Received pt from endo wing. AAOx4. 1/2L NC air. No s/s of respiratory distress noted. No pain voiced. Family at bedside. Oriented to room. VS taken being performed see flowsheet.  Safety measures maintained. Call light within reach. Will continue to monitor.

## 2023-12-12 NOTE — ASSESSMENT & PLAN NOTE
Patient was recently treated for multifocal pneumonia and pleural effusion status post thoracentesis.  Patient was discharged home on December 6 with cefdinir and azithromycin.  Patient was unable to pick last day of abx from pharmacy.  CT shows progression of airspace disease and moderate right pleural effusion.  -pulm consulted   -bronch performed on 12/12  -plan for BAL on Friday after plavix washout   -ctx/azithro

## 2023-12-12 NOTE — ASSESSMENT & PLAN NOTE
History of VFib arrest on 10/13/2023 after outpatient PCI, currently on amiodarone  On dual antiplatelet therapy for CAD - plavix being held for BAL on Friday   Placed on eliquis w/plans to transition to therapeutic lovenox

## 2023-12-12 NOTE — ASSESSMENT & PLAN NOTE
-GI consulted - believe may be 2/2 gastroparesis   -EGD on 12/11, s/p esophageal dilation   -stool h.pylori sent, pending   -famotidine

## 2023-12-12 NOTE — ASSESSMENT & PLAN NOTE
Follows with Dr. Lloyd outpatient, status post right lower lobe lobectomy.  Has not started chemotherapy yet. Pulm concerned this may be mnemonic adenocarcinoma returning

## 2023-12-12 NOTE — PROGRESS NOTES
[facility]  Progress Note    Patient Name: Alexa Otero  MRN: [id number]  Patient Class: IP- Inpatient   Admission Date: 12/10/2023  Length of Stay: 0 days  Attending Physician: Harinder Lemon MD  Primary Care Provider: Idalia Greco MD        Subjective:     Principal Problem:Pneumonia        HPI:  Patient is an 83-year-old female with history of CAD status post PCI, VFib arrest, type 2 diabetes, hyperlipidemia, hypertension, right lobe adenocarcinoma status post lobectomy who presents with epigastric discomfort.  Patient says this began about 3 days ago and have been worsening.  Patient denies any abdominal pain but says it is very uncomfortable.  Has had associated nausea but no vomiting.  Patient says she is constantly burping.  Denies any constipation or diarrhea.  Patient was recently treated for multifocal pneumonia and pleural effusion and discharged home on December 6 with cefdinir and azithromycin.  Patient says she never took any of the antibiotics at home as she says her  did not pick them up.  Patient does have shortness of breath with exertion.  Denies any fevers, chills, or cough.  In ED, patient was 94% on room air.  WBC 6.8 and hemoglobin 10.0.  Creatinine 1.0.  .    Chest x-ray  Progression of the alveolar consolidation in the right mid and lower lung with small right pleural effusion.  There are groundglass and interstitial opacities in the right upper lobe    CT chest abdomen pelvis   Progression of the alveolar consolidation in the right mid and lower lung with moderate size right pleural effusion. There is progression of the airspace disease within the right upper lobe with patchy groundglass opacity left upper lobe and left lung base. Findings are compatible with multifocal.  Moderate vascular calcification of the coronary arteries and aorta  Fatty infiltration of the liver with stable hypodense lesions within the liver compatible with  cysts  Colonic diverticulosis without diverticulitis  Prior cholecystectomy and hysterectomy  Stable rim calcified mass in the right upper quadrant  Degenerative changes of the spine    Overview/Hospital Course:  Ms. Otero is a 82 yo w/pmhx of CAD, Vfib arrest in 10/2023 after outpatient PCI, T2DM, R lobe adenocarcinoma s/p lobectomy on 10/2023 who is admitted for recurrent pneumonia and belching. She was recenlty treated for a pneumonia at [facility] for 4 days and discharged on cefdinir and azithromycin. However, patient was unable to  her medications. Cxr revealed progression of the R mid and lower lung consolidation and small pleural effusion. Pulm was consulted and patient bronched on 12/12. Pulm concerned this may be her mnemonic adenocarcinoma. GI also consulted for belching and EGD relatively normal s/p esophageal dilation to see if that would help. Belching thought to be possibly 2/2 gastroparesis. Plan for BAL on Friday while allowing for the plavix to wash out.         Interval History: Patient underwent bronch this AM. Denies any complications from bronch. Denies any SOB or chest pain this AM.     Review of Systems   All other systems reviewed and are negative.    Objective:     Vital Signs (Most Recent):  Temp: 98.2 °F (36.8 °C) (12/12/23 1230)  Pulse: 81 (12/12/23 1230)  Resp: 18 (12/12/23 1230)  BP: (!) 158/71 (12/12/23 1230)  SpO2: 97 % (12/12/23 1230) Vital Signs (24h Range):  Temp:  [97.4 °F (36.3 °C)-99.3 °F (37.4 °C)] 98.2 °F (36.8 °C)  Pulse:  [74-94] 81  Resp:  [12-19] 18  SpO2:  [94 %-100 %] 97 %  BP: (121-173)/(58-82) 158/71     Weight: 54.4 kg (120 lb)  Body mass index is 21.26 kg/m².    Intake/Output Summary (Last 24 hours) at 12/12/2023 0661  Last data filed at 12/12/2023 0958  Gross per 24 hour   Intake 100 ml   Output --   Net 100 ml           Physical Exam  Constitutional:       Appearance: Normal appearance.   HENT:      Head: Normocephalic and atraumatic.      Right Ear: External ear  normal.      Left Ear: External ear normal.      Nose: Nose normal.      Mouth/Throat:      Mouth: Mucous membranes are moist.   Eyes:      Extraocular Movements: Extraocular movements intact.   Cardiovascular:      Rate and Rhythm: Normal rate and regular rhythm.      Heart sounds: No murmur heard.  Pulmonary:      Effort: No respiratory distress.      Breath sounds: Rhonchi present. No wheezing.      Comments: NC in place but oxygen turned off  Abdominal:      General: Abdomen is flat. Bowel sounds are normal. There is no distension.   Musculoskeletal:      Right lower leg: No edema.      Left lower leg: No edema.   Skin:     General: Skin is warm.   Neurological:      General: No focal deficit present.      Mental Status: She is alert and oriented to person, place, and time.   Psychiatric:         Mood and Affect: Mood normal.         Behavior: Behavior normal.             Significant Labs: All pertinent labs within the past 24 hours have been reviewed.    Significant Imaging: I have reviewed all pertinent imaging results/findings within the past 24 hours.    Assessment/Plan:      * Pneumonia  Patient was recently treated for multifocal pneumonia and pleural effusion status post thoracentesis.  Patient was discharged home on December 6 with cefdinir and azithromycin.  Patient was unable to pick last day of abx from pharmacy.  CT shows progression of airspace disease and moderate right pleural effusion.  -pulm consulted   -bronch performed on 12/12  -plan for BAL on Friday after plavix washout   -ctx/azithro         Multifocal pneumonia  -ctx/azithro       Iron deficiency anemia due to chronic blood loss  Noted     Epigastric discomfort  -GI consulted - believe may be 2/2 gastroparesis   -EGD on 12/11, s/p esophageal dilation   -stool h.pylori sent, pending   -famotidine       Malignant neoplasm of lower lobe of right lung  Follows with Dr. Lloyd outpatient, status post right lower lobe lobectomy.  Has not  started chemotherapy yet. Pulm concerned this may be mnemonic adenocarcinoma returning     Cardiopulmonary arrest  History of VFib arrest on 10/13/2023 after outpatient PCI, currently on amiodarone  On dual antiplatelet therapy for CAD - plavix being held for BAL on Friday   Placed on eliquis w/plans to transition to therapeutic lovenox       VTE Risk Mitigation (From admission, onward)           Ordered     apixaban tablet 2.5 mg  2 times daily         12/12/23 1011     IP VTE HIGH RISK PATIENT  Once         12/10/23 1403     Place sequential compression device  Until discontinued         12/10/23 1403                    Discharge Planning   ALYCIA: 12/13/2023     Code Status: Full Code   Is the patient medically ready for discharge?:     Reason for patient still in hospital (select all that apply): Treatment and Consult recommendations  Discharge Plan A: Home with family                  Harinder Lemon MD  Department of Hospital Medicine   [facility]

## 2023-12-12 NOTE — ANESTHESIA POSTPROCEDURE EVALUATION
Anesthesia Post Evaluation    Patient: Alexa Otero    Procedure(s) Performed: Procedure(s) (LRB):  BRONCHOSCOPY (N/A)    Final Anesthesia Type: general      Patient location during evaluation: GI PACU  Patient participation: Yes- Able to Participate  Level of consciousness: awake and alert  Post-procedure vital signs: reviewed and stable  Pain management: adequate  Airway patency: patent    PONV status at discharge: No PONV  Anesthetic complications: no      Cardiovascular status: hemodynamically stable  Respiratory status: unassisted, spontaneous ventilation and room air  Hydration status: euvolemic  Follow-up not needed.              Vitals Value Taken Time   /69 12/12/23 1110   Temp  12/12/23 1134   Pulse 83 12/12/23 1053   Resp 5 12/12/23 1053   SpO2 96 % 12/12/23 1053   Vitals shown include unvalidated device data.      Event Time   Out of Recovery 12/12/2023 10:42:54         Pain/Jess Score: Pain Rating Prior to Med Admin: 3 (12/11/2023 11:01 PM)  Pain Rating Post Med Admin: 0 (12/11/2023 11:53 PM)  Jess Score: 9 (12/12/2023 10:06 AM)

## 2023-12-12 NOTE — PROCEDURES
Bronchoscopy    Indication:  Nonresolving pneumonia   Medications: Per anesthesia  Specimens:  Bronchial lavage  Complications: None    The bronchoscope was introduced through the left nare. The hypopharynx and larynx were unremarkable. The vocal cords were midline and opposed well. The trachea was midline. The dom was sharp. The right upper lobe And right middle lobe subdivided into the usual subsegments.  The site of the resection of the right lower lobe was noted and is healing well.There were no endobronchial or submucosal abnormalities noted.   There was no mucus.  There was no erythema.  There was no edema.The left upper lobe and left lower lobe subdivided into the usual subsegments. There were no endobronchial or submucosal abnormalities noted.  A lavage of the right middle lobe was undertaken with 60 cc of saline , 20 were returned.  This was very bland with the exception of some trauma from suctioning.. The specimens were sent for C and S, KOH and fungal culture, AFB and mycobacterial culture,  and cytology. The patient tolerated the procedure well.

## 2023-12-12 NOTE — PLAN OF CARE
"Met with pt who had spouse present. Discussed pt not obtaining antibiotics after last admission and discussed her having them filled at hospital to be delivered to room at discharge. She and spouse in agreement with this and pharmacy choice placed. CM discussed HH as pt spouse states that it would be helpful for medications. In discussing this, pt states that it was a problem in the past but is not at this time. States that her daughter assisted and she now has a "spreadsheet" that she follows daily.  She notes that her other daughter will be in this weekend to stay with them and will verify that she has the correct meds/times then as well.  Declines HH at this time.  Pt states that she and spouse will be sure to review discharge medication list in discharge paperwork.  Denies current discharge needs and plans to discharge home with spouse and outpt f/u.  Will continue to follow.       12/12/23 1126   Post-Acute Status   Post-Acute Authorization Home Health   Home Health Status Patient declined/refused   Discharge Plan   Discharge Plan A Home with family   Discharge Plan B Home with family       "

## 2023-12-13 VITALS
RESPIRATION RATE: 18 BRPM | HEIGHT: 63 IN | HEART RATE: 84 BPM | OXYGEN SATURATION: 93 % | BODY MASS INDEX: 21.26 KG/M2 | DIASTOLIC BLOOD PRESSURE: 71 MMHG | WEIGHT: 120 LBS | TEMPERATURE: 98 F | SYSTOLIC BLOOD PRESSURE: 146 MMHG

## 2023-12-13 LAB
ALBUMIN SERPL BCP-MCNC: 2.9 G/DL (ref 3.5–5.2)
ALP SERPL-CCNC: 40 U/L (ref 55–135)
ALT SERPL W/O P-5'-P-CCNC: 13 U/L (ref 10–44)
ANION GAP SERPL CALC-SCNC: 6 MMOL/L (ref 8–16)
AST SERPL-CCNC: 10 U/L (ref 10–40)
BACTERIA SPEC AEROBE CULT: NORMAL
BASOPHILS # BLD AUTO: 0.03 K/UL (ref 0–0.2)
BASOPHILS NFR BLD: 0.3 % (ref 0–1.9)
BILIRUB SERPL-MCNC: 0.2 MG/DL (ref 0.1–1)
BUN SERPL-MCNC: 16 MG/DL (ref 8–23)
CALCIUM SERPL-MCNC: 8.7 MG/DL (ref 8.7–10.5)
CHLORIDE SERPL-SCNC: 107 MMOL/L (ref 95–110)
CO2 SERPL-SCNC: 28 MMOL/L (ref 23–29)
CREAT SERPL-MCNC: 1 MG/DL (ref 0.5–1.4)
DIFFERENTIAL METHOD BLD: ABNORMAL
EOSINOPHIL # BLD AUTO: 0.2 K/UL (ref 0–0.5)
EOSINOPHIL NFR BLD: 2.4 % (ref 0–8)
ERYTHROCYTE [DISTWIDTH] IN BLOOD BY AUTOMATED COUNT: 14.8 % (ref 11.5–14.5)
EST. GFR  (NO RACE VARIABLE): 56.2 ML/MIN/1.73 M^2
GLUCOSE SERPL-MCNC: 124 MG/DL (ref 70–110)
GRAM STN SPEC: NORMAL
HCT VFR BLD AUTO: 31.3 % (ref 37–48.5)
HGB BLD-MCNC: 9.5 G/DL (ref 12–16)
IMM GRANULOCYTES # BLD AUTO: 0.05 K/UL (ref 0–0.04)
IMM GRANULOCYTES NFR BLD AUTO: 0.5 % (ref 0–0.5)
LYMPHOCYTES # BLD AUTO: 1.1 K/UL (ref 1–4.8)
LYMPHOCYTES NFR BLD: 11.2 % (ref 18–48)
MCH RBC QN AUTO: 28.4 PG (ref 27–31)
MCHC RBC AUTO-ENTMCNC: 30.4 G/DL (ref 32–36)
MCV RBC AUTO: 93 FL (ref 82–98)
MONOCYTES # BLD AUTO: 0.7 K/UL (ref 0.3–1)
MONOCYTES NFR BLD: 7.7 % (ref 4–15)
NEUTROPHILS # BLD AUTO: 7.4 K/UL (ref 1.8–7.7)
NEUTROPHILS NFR BLD: 77.9 % (ref 38–73)
NRBC BLD-RTO: 0 /100 WBC
PLATELET # BLD AUTO: 418 K/UL (ref 150–450)
PMV BLD AUTO: 9.6 FL (ref 9.2–12.9)
POTASSIUM SERPL-SCNC: 4 MMOL/L (ref 3.5–5.1)
PROT SERPL-MCNC: 5.9 G/DL (ref 6–8.4)
RBC # BLD AUTO: 3.35 M/UL (ref 4–5.4)
SODIUM SERPL-SCNC: 141 MMOL/L (ref 136–145)
WBC # BLD AUTO: 9.45 K/UL (ref 3.9–12.7)

## 2023-12-13 PROCEDURE — 25000003 PHARM REV CODE 250: Performed by: STUDENT IN AN ORGANIZED HEALTH CARE EDUCATION/TRAINING PROGRAM

## 2023-12-13 PROCEDURE — 25000003 PHARM REV CODE 250: Performed by: INTERNAL MEDICINE

## 2023-12-13 PROCEDURE — 80053 COMPREHEN METABOLIC PANEL: CPT | Performed by: STUDENT IN AN ORGANIZED HEALTH CARE EDUCATION/TRAINING PROGRAM

## 2023-12-13 PROCEDURE — 36415 COLL VENOUS BLD VENIPUNCTURE: CPT | Performed by: STUDENT IN AN ORGANIZED HEALTH CARE EDUCATION/TRAINING PROGRAM

## 2023-12-13 PROCEDURE — 85025 COMPLETE CBC W/AUTO DIFF WBC: CPT | Performed by: STUDENT IN AN ORGANIZED HEALTH CARE EDUCATION/TRAINING PROGRAM

## 2023-12-13 PROCEDURE — A4216 STERILE WATER/SALINE, 10 ML: HCPCS | Performed by: STUDENT IN AN ORGANIZED HEALTH CARE EDUCATION/TRAINING PROGRAM

## 2023-12-13 PROCEDURE — 99233 SBSQ HOSP IP/OBS HIGH 50: CPT | Mod: ,,, | Performed by: INTERNAL MEDICINE

## 2023-12-13 PROCEDURE — 94760 N-INVAS EAR/PLS OXIMETRY 1: CPT

## 2023-12-13 PROCEDURE — 99900035 HC TECH TIME PER 15 MIN (STAT)

## 2023-12-13 PROCEDURE — 63600175 PHARM REV CODE 636 W HCPCS: Performed by: STUDENT IN AN ORGANIZED HEALTH CARE EDUCATION/TRAINING PROGRAM

## 2023-12-13 RX ORDER — GUAIFENESIN 100 MG/5ML
200 SOLUTION ORAL 3 TIMES DAILY PRN
Qty: 100 ML | Refills: 0 | Status: SHIPPED | OUTPATIENT
Start: 2023-12-13 | End: 2024-01-11 | Stop reason: SDUPTHER

## 2023-12-13 RX ADMIN — ASPIRIN 81 MG: 81 TABLET, COATED ORAL at 09:12

## 2023-12-13 RX ADMIN — MUPIROCIN 1 G: 20 OINTMENT TOPICAL at 09:12

## 2023-12-13 RX ADMIN — AZITHROMYCIN DIHYDRATE 500 MG: 500 INJECTION, POWDER, LYOPHILIZED, FOR SOLUTION INTRAVENOUS at 09:12

## 2023-12-13 RX ADMIN — METOPROLOL SUCCINATE 25 MG: 25 TABLET, EXTENDED RELEASE ORAL at 09:12

## 2023-12-13 RX ADMIN — GABAPENTIN 100 MG: 100 CAPSULE ORAL at 09:12

## 2023-12-13 RX ADMIN — SODIUM CHLORIDE, PRESERVATIVE FREE 10 ML: 5 INJECTION INTRAVENOUS at 09:12

## 2023-12-13 RX ADMIN — APIXABAN 2.5 MG: 2.5 TABLET, FILM COATED ORAL at 09:12

## 2023-12-13 RX ADMIN — SIMETHICONE 80 MG: 80 TABLET, CHEWABLE ORAL at 09:12

## 2023-12-13 RX ADMIN — AMIODARONE HYDROCHLORIDE 200 MG: 200 TABLET ORAL at 09:12

## 2023-12-13 RX ADMIN — FAMOTIDINE 20 MG: 20 TABLET ORAL at 09:12

## 2023-12-13 NOTE — CARE UPDATE
12/13/23 0723   Patient Assessment/Suction   Level of Consciousness (AVPU) alert   Respiratory Effort Normal;Unlabored   Expansion/Accessory Muscles/Retractions no use of accessory muscles;no retractions;expansion symmetric   All Lung Fields Breath Sounds diminished   Rhythm/Pattern, Respiratory unlabored;pattern regular;depth regular   PRE-TX-O2   Device (Oxygen Therapy) room air   SpO2 98 %   Pulse Oximetry Type Intermittent   $ Pulse Oximetry - Single Charge Pulse Oximetry - Single   Aerosol Therapy   $ Aerosol Therapy Charges PRN treatment not required

## 2023-12-13 NOTE — PLAN OF CARE
Discharge orders and chart reviewed with no further post-acute discharge needs identified at this time.  At this time, patient is cleared for discharge from Case Management standpoint.     Has PCP follow up appt 12/20/23 12/13/23 1208   Final Note   Assessment Type Final Discharge Note   Anticipated Discharge Disposition Home   Post-Acute Status   Post-Acute Authorization Other   Home Health Status Patient declined/refused   Other Status No Post-Acute Service Needs   Discharge Delays None known at this time

## 2023-12-13 NOTE — PROGRESS NOTES
Pulmonary/Critical Care Consult      PATIENT NAME: Alexa Otero  MRN: [id number]  TODAY'S DATE: 2023  10:20 AM  ADMIT DATE: 12/10/2023  AGE: 83 y.o. 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  She developed a right middle lobe right upper lobe pneumonic infiltrate with parapneumonic effusion last month.  She was treated with 4 days of antibiotics in the hospital and then did not get her 5th day as an outpatient.  The CT shows significant progression of the right middle lobe process with increasing infiltrate in the left lower lobe and 2 cavitary nodular areas in the left upper lobe that are fairly unchanged from November but were not there in August.  She also has a moderate fusion again.  This was a parapneumonic effusion last month.  The patient is coughing up a handful of clear mucus every hour.  This is what she was coughing up before her right lower lobe was resected.  I am very concerned that this is more of her adenocarcinoma.  The patient had recent cardiac stents placed right before her right lower lobe resection and is on dual antiplatelet therapy.  The patient does not appear toxic.  She was admitted to the hospital with abdominal pain.     The patient states she is still expectorating a handful of clear mucus hourly.  She underwent bronchoscopy which had no inflammation, mucus, or edema.   I am desperately worried that this is her cancer not a pneumonia.  I am holding her Plavix and using the regimen Dr. Shankar did for her right lower lobe resection for anticoagulation and will plan on Friday to do transbronchial biopsies.  Even this may not be revealing as her adenocarcinoma is so well differentiated.     the patient's bronchoscopy does not show any infectious component.  Her bronchoscopy did not look at all  inflamed mucousy etc..  There was no reason for the patient to stay in the hospital to receive anymore antibiotics.  These can be stopped.  I am planning to do a bronch with transbronchial biopsies on Friday.  Hopefully this will give us an answer but not necessarily.  I think the patient may benefit from an outpatient PET scan.  Dr. Cassidy is her oncologist and I am reaching out to him.    REVIEW OF SYSTEMS  GENERAL: Feeling ok  EYES: Vision is good.  ENT: No sinusitis or pharyngitis.   HEART: No chest pain or palpitations.  LUNGS:  She is expectorating cupfuls of clear mucus a day.  GI:  She feels like she has gas but is not distended.  She has been nauseated.  She feels worse after she eats.  : No dysuria, hesitancy, or nocturia.  SKIN: No lesions or rashes.  MUSCULOSKELETAL: No joint pain or myalgias.  NEURO: No headaches or neuropathy.  LYMPH: No edema or adenopathy.  PSYCH: No anxiety or depression.  ENDO: No weight change.    No change in the patient's Past Medical History, Past Surgical History, Social History or Family History since admission.    VITAL SIGNS (MOST RECENT)  Temp: 97.5 °F (36.4 °C) (12/13/23 0701)  Pulse: (P) 80 (12/13/23 0919)  Resp: 18 (12/13/23 0701)  BP: (!) (P) 147/69 (12/13/23 0919)  SpO2: 98 % (12/13/23 0723)    INTAKE AND OUTPUT (LAST 24 HOURS):  Intake/Output Summary (Last 24 hours) at 12/13/2023 0920  Last data filed at 12/13/2023 0833  Gross per 24 hour   Intake 940 ml   Output --   Net 940 ml       WEIGHT  Wt Readings from Last 1 Encounters:   12/12/23 54.4 kg (120 lb)       PHYSICAL EXAM  GENERAL: Older patient in no distress.  HEENT: Pupils equal and reactive. Extraocular movements intact. Nose intact. Pharynx moist.  NECK: Supple.   HEART: Regular rate and rhythm. No murmur or gallop auscultated.  LUNGS:  There are dense crackles in both bases.  There are rhonchi over the right anterior thorax.  Lung excursion symmetrical. No change in fremitus.  ABDOMEN: Bowel sounds  present. Non-tender, no masses palpated.  : Normal anatomy.  EXTREMITIES: Normal muscle tone and joint movement, no cyanosis or clubbing.   LYMPHATICS: No adenopathy palpated, no edema.  SKIN: Dry, intact, no lesions.   NEURO: Cranial nerves II-XII intact. Motor strength 5/5 bilaterally, upper and lower extremities.  PSYCH: Appropriate affect        CBC LAST (LAST 24 HOURS)  Recent Labs   Lab 12/13/23  0641   WBC 9.45   RBC 3.35*   HGB 9.5*   HCT 31.3*   MCV 93   MCH 28.4   MCHC 30.4*   RDW 14.8*      MPV 9.6   GRAN 77.9*  7.4   LYMPH 11.2*  1.1   MONO 7.7  0.7   BASO 0.03   NRBC 0       CHEMISTRY LAST (LAST 24 HOURS)  Recent Labs   Lab 12/13/23  0641      K 4.0      CO2 28   ANIONGAP 6*   BUN 16   CREATININE 1.0   *   CALCIUM 8.7   ALBUMIN 2.9*   PROT 5.9*   ALKPHOS 40*   ALT 13   AST 10   BILITOT 0.2         CARDIAC PROFILE (LAST 24 HOURS)  Recent Labs   Lab 12/10/23  1105   *   TROPONINIHS 9.7       LAST 7 DAYS MICROBIOLOGY   Microbiology Results (last 7 days)       Procedure Component Value Units Date/Time    Culture, Respiratory [[id number]] Collected: 12/12/23 1018    Order Status: Completed Specimen: Respiratory from Bronchial Wash Updated: 12/13/23 0841     Respiratory Culture Normal respiratory cece     Gram Stain (Respiratory) <10 epithelial cells per low power field.     Gram Stain (Respiratory) Few WBC's     Gram Stain (Respiratory) Few Gram positive cocci    Narrative:      Bronchial Wash , Lung, RML    Culture, Respiratory with Gram Stain [[id number]] Collected: 12/11/23 1118    Order Status: Completed Specimen: Respiratory from Sputum Updated: 12/13/23 0826     Respiratory Culture Normal respiratory cece     Gram Stain (Respiratory) <10 epithelial cells per low power field.     Gram Stain (Respiratory) Rare WBC's     Gram Stain (Respiratory) Rare Gram positive cocci     Gram Stain (Respiratory) Rare Gram negative rods     Gram Stain (Respiratory) Rare budding  yeast    Blood culture #2 **CANNOT BE ORDERED STAT** [[id number]] Collected: 12/10/23 1458    Order Status: Completed Specimen: Blood Updated: 12/12/23 1632     Blood Culture, Routine No Growth to date      No Growth to date      No Growth to date    Blood culture #1 **CANNOT BE ORDERED STAT** [[id number]] Collected: 12/10/23 1506    Order Status: Completed Specimen: Blood from Antecubital, Right Updated: 12/12/23 1632     Blood Culture, Routine No Growth to date      No Growth to date      No Growth to date    JUSTICE prep [[id number]] Collected: 12/12/23 1018    Order Status: Completed Specimen: Respiratory from Bronchial Wash Updated: 12/12/23 1315     KOH Prep No yeast or fungal elements seen    Narrative:      Bronchial Wash , Lung, RML    Fungus culture [[id number]] Collected: 12/12/23 1018    Order Status: Sent Specimen: Respiratory from Bronchial Wash Updated: 12/12/23 1035    AFB Culture & Smear [[id number]] Collected: 12/12/23 1018    Order Status: Sent Specimen: Respiratory from Bronchial Wash Updated: 12/12/23 1035    Gram stain [[id number]] Collected: 12/12/23 1018    Order Status: Canceled Specimen: Respiratory from Lung, RML             MOST RECENT IMAGING  CT Chest Abdomen Pelvis With IV Contrast (XPD) NO Oral Contrast   ADDENDUM #1     This is a correction to the impression    Findings are compatible with multifocal pneumonia.    Electronically signed by:  Chichi Gann MD  12/10/2023 01:42 PM CST Workstation: [id number]     ORIGINAL REPORT     CMS MANDATED QUALITY DATA - CT RADIATION - 436    All CT scans at this facility utilize dose modulation, iterative reconstruction, and/or weight based dosing when appropriate to reduce radiation dose to as low as reasonably achievable.    Reason: Upper Abdominal pain , lung cancer, right thoracotomy    TECHNIQUE: CT thorax, abdomen, and pelvis with 100 mL Omnipaque 350.    COMPARISON: CTA chest dated 12/4/2023 PET/CT dated 8/15/2023    CT THORAX:  Base of the  neck is unremarkable. There are stable tiny thyroid nodules. There is no supraclavicular adenopathy.  There is a right IJ Port-A-Cath with the tip in the superior vena cava.    LUNGS: Progression of the alveolar consolidation in the right mid and lower lobe with moderate size right pleural effusion. There is progression of the groundglass and interstitial opacities in the right upper lobe.  Patchy groundglass opacities in the lateral left upper lobe and posterior left lower lobe.  There are mild emphysematous changes.    Heart is normal in size. There is multivessel coronary artery calcification. Aorta is normal in caliber.  Mediastinum: There is no pathologic adenopathy. There are mildly prominent mediastinal lymph nodes which may be reactive.    There are bilateral rim calcified breast implants. The musculature is normal.  There are degenerative changes of the spine. There are no lytic or blastic lesions.    CT ABDOMEN:  Liver is normal in size and contour. The liver is hypodense compatible with fatty infiltration.  There is a stable 13 mm low-density lesion within the anterior segment of the right lobe of the liver. There is a 4 cm cyst within the posterior segment of the right lobe of the liver.  There is no biliary duct dilatation. The gallbladder is absent.    Spleen is normal in size and contour    Pancreas demonstrate normal enhancement. There is no focal mass.    There are no thick-walled or dilated bowel loops. There is colonic diverticulosis without diverticulitis.  There is no mesenteric or retroperitoneal adenopathy.  There is a stable 3.1 cm rim calcified mass in the right upper quadrant  The aorta is normal in caliber. There is moderate vascular calcification.    There is no free fluid or free air.  The musculature is normal.  There is rightward curvature of the lumbar spine. There are degenerative changes of the spine without lytic or blastic lesions.    CT PELVIS:  The bladder is normal. There is  colonic diverticulosis. There is no pelvic adenopathy. There is hardware within the right hip.    IMPRESSION:  Progression of the alveolar consolidation in the right mid and lower lung with moderate size right pleural effusion. There is progression of the airspace disease within the right upper lobe with patchy groundglass opacity left upper lobe and left lung base. Findings are compatible with multifocal.    Moderate vascular calcification of the coronary arteries and aorta    Fatty infiltration of the liver with stable hypodense lesions within the liver compatible with cysts    Colonic diverticulosis without diverticulitis    Prior cholecystectomy and hysterectomy    Stable rim calcified mass in the right upper quadrant    Degenerative changes of the spine    Electronically signed by:  Chichi Gann MD  12/10/2023 01:29 PM CST Workstation: [id number]  X-Ray Chest AP Portable  Portable chest x-ray 11:06 AM is compared to prior study dated 12/6/2023    Clinical history is cough and shortness of breath    There is a right IJ Port-A-Cath in stable position.  The cardiomediastinal silhouette is stable.  There is progression of the alveolar consolidation in the right mid and lower lung with small right pleural effusion. There are stable interstitial and groundglass opacity in the right upper lobe.  The left lung is clear.  There are bilateral breast implants. There are no acute osseous abnormalities.    IMPRESSION: Progression of the alveolar consolidation in the right mid and lower lung with small right pleural effusion.  There are groundglass and interstitial opacities in the right upper lobe    Electronically signed by:  Chichi Gann MD  12/10/2023 11:54 AM CST Workstation: [id number]      CURRENT VISIT EKG  Results for orders placed or performed during the hospital encounter of 12/10/23   EKG 12-lead    Narrative    Test Reason : R06.02,    Vent. Rate : 095 BPM     Atrial Rate : 095 BPM     P-R Int : 128 ms      "     QRS Dur : 064 ms      QT Int : 376 ms       P-R-T Axes : 060 041 088 degrees     QTc Int : 472 ms    Sinus rhythm with Premature supraventricular complexes  Anteroseptal infarct ,age undetermined  Abnormal ECG  When compared with ECG of 03-DEC-2023 18:38,  Premature supraventricular complexes are now Present  Anteroseptal infarct is now Present  Confirmed by Luis Eduardo LAWSON, Narciso SAMANIEGO ([id number]) on 12/10/2023 9:37:25 PM    Referred By: [email or web address]   SELF           Confirmed By:Narciso Hoff MD       ECHOCARDIOGRAM RESULTS  Results for orders placed during the hospital encounter of 10/13/23    Echo    Interpretation Summary    Left Ventricle: The left ventricle is normal in size. Normal wall thickness. Normal wall motion. There is normal systolic function with a visually estimated ejection fraction of 55 - 60%. Grade I diastolic dysfunction.    Right Ventricle: Normal right ventricular cavity size. Wall thickness is normal. Right ventricle wall motion  is normal. Systolic function is normal.    Aortic Valve: There is moderate aortic valve sclerosis.    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 There is mild mitral annular calcification present. There is no stenosis. The mean pressure gradient across the mitral valve is 3 mmHg at a heart rate of  bpm. There is mild regurgitation.    Tricuspid Valve: There is mild regurgitation.  No pulmonary hypertension.    IVC/SVC: Normal venous pressure at 3 mmHg.        The patient is on room air              LAST ARTERIAL BLOOD GAS  ABG  No results for input(s): "PH", "PO2", "PCO2", "HCO3", "BE" in the last 168 hours.    IMPRESSION AND PLAN  Progression of right upper lobe right middle lobe left lower lobe and left upper lobe infiltrates.  The right middle lobe is densely consolidated.  There is a moderate right pleural effusion.  There is more left lower lobe infiltrate.  I am very concerned that this could be her pneumonic adenocarcinoma as she is bringing up " handfuls of clear mucus which is what she did before her resection.  However it is very soon for this to be progressing this fast.    Abdominal discomfort, negative EGD, likely the antibiotics  Anemia, iron deficiency, worse with hydration   Anticoagulated with Plavix and aspirin following stents and cardiopulmonary arrest.  Last dose of Plavix was on the 9th.  Continue Eliquis through today than nothing Thursday for bronchoscopy on Friday with transbronchial biopsies  No more antibiotics  Appropriate to discharge home  Discussed this with patient and Dr. Lemon  Trying to reach Dr. Cassidy to discuss outpatient PET scan in case the bronchoscopy is nondiagnostic    Neva Christian MD  Date of Service: 12/13/2023  10:20 AM

## 2023-12-13 NOTE — DISCHARGE INSTRUCTIONS
You will have a BAL on Friday. Take 1 more dose of eliquis tonight. Do not take any blood thinner (plavix or eliquis) tomorrow or Friday. Return on Friday for the BAL. Discuss with Dr. Christian when you can start taking the plavix again after the procedure.

## 2023-12-13 NOTE — NURSING
12/13/2023      Assumed care of patient.   Assessment and vital signs assessed.   Labs and meds reviewed.  Will continue to monitor this shift.        Pt awake in the bed, looking at tv, call bell in reach, bed in lowest position, no pain or concerns at this time.

## 2023-12-13 NOTE — PHYSICIAN QUERY
PT Name: Alexa Otero  MR #: [id number]    DOCUMENTATION CLARIFICATION     CDS/: [facility]               Contact information [phone]  This form is a permanent document in the medical record.     Query Date: December 13, 2023    By submitting this query, we are merely seeking further clarification of documentation.  Please utilize your independent clinical judgment when addressing the question(s) below.    The medical record contains the following:  Pathology Findings Location in Medical Record   RIGHT LOWER LOBE LUNG MASS:   --INVASIVE MUCINOUS LUNG ADENOCARCINOMA.  Path Report       Please clarify the pathology findings.  [ x ] Pathology findings noted above are ruled in/confirmed as diagnoses   [  ] Pathology findings noted above are not confirmed as diagnoses

## 2023-12-13 NOTE — PLAN OF CARE
Problem: Adult Inpatient Plan of Care  Goal: Plan of Care Review  Outcome: Ongoing, Progressing  Goal: Patient-Specific Goal (Individualized)  Outcome: Ongoing, Progressing  Goal: Absence of Hospital-Acquired Illness or Injury  Outcome: Ongoing, Progressing  Goal: Optimal Comfort and Wellbeing  Outcome: Ongoing, Progressing  Goal: Readiness for Transition of Care  Outcome: Ongoing, Progressing     Problem: Diabetes Comorbidity  Goal: Blood Glucose Level Within Targeted Range  Outcome: Ongoing, Progressing     Problem: Adjustment to Illness (Sepsis/Septic Shock)  Goal: Optimal Coping  Outcome: Ongoing, Progressing     Problem: Bleeding (Sepsis/Septic Shock)  Goal: Absence of Bleeding  Outcome: Ongoing, Progressing     Problem: Glycemic Control Impaired (Sepsis/Septic Shock)  Goal: Blood Glucose Level Within Desired Range  Outcome: Ongoing, Progressing     Problem: Infection Progression (Sepsis/Septic Shock)  Goal: Absence of Infection Signs and Symptoms  Outcome: Ongoing, Progressing     Problem: Nutrition Impaired (Sepsis/Septic Shock)  Goal: Optimal Nutrition Intake  Outcome: Ongoing, Progressing     Problem: Fluid Imbalance (Pneumonia)  Goal: Fluid Balance  Outcome: Ongoing, Progressing     Problem: Infection (Pneumonia)  Goal: Resolution of Infection Signs and Symptoms  Outcome: Ongoing, Progressing     Problem: Respiratory Compromise (Pneumonia)  Goal: Effective Oxygenation and Ventilation  Outcome: Ongoing, Progressing     Problem: Infection  Goal: Absence of Infection Signs and Symptoms  Outcome: Ongoing, Progressing

## 2023-12-13 NOTE — NURSING
Discharge instructions provided to pt- verbalized understanding- iv removed intact - pt escorted out via w/c with personal belongings to personal vehicle with prescriptions in hand in stable condition

## 2023-12-14 ENCOUNTER — PATIENT OUTREACH (OUTPATIENT)
Dept: ADMINISTRATIVE | Facility: CLINIC | Age: 83
End: 2023-12-14
Payer: MEDICARE

## 2023-12-14 LAB
BACTERIA SPEC AEROBE CULT: NORMAL
GRAM STN SPEC: NORMAL
H PYLORI AG STL QL IA: NEGATIVE

## 2023-12-14 NOTE — DISCHARGE SUMMARY
[facility]  Discharge Summary      Patient Name: Alexa Otero  MRN: [id number]  [facility]: [id number]  Patient Class: IP- Inpatient  Admission Date: 12/10/2023  Hospital Length of Stay: 1 days  Discharge Date and Time:  12/13/2023 9:41 PM  Attending Physician: No att. providers found   Discharging Provider: Harinder Lemon MD  Primary Care Provider: Idalia Greco MD    Primary Care Team: Networked reference to record PCT     HPI:   Patient is an 83-year-old female with history of CAD status post PCI, VFib arrest, type 2 diabetes, hyperlipidemia, hypertension, right lobe adenocarcinoma status post lobectomy who presents with epigastric discomfort.  Patient says this began about 3 days ago and have been worsening.  Patient denies any abdominal pain but says it is very uncomfortable.  Has had associated nausea but no vomiting.  Patient says she is constantly burping.  Denies any constipation or diarrhea.  Patient was recently treated for multifocal pneumonia and pleural effusion and discharged home on December 6 with cefdinir and azithromycin.  Patient says she never took any of the antibiotics at home as she says her  did not pick them up.  Patient does have shortness of breath with exertion.  Denies any fevers, chills, or cough.  In ED, patient was 94% on room air.  WBC 6.8 and hemoglobin 10.0.  Creatinine 1.0.  .    Chest x-ray  Progression of the alveolar consolidation in the right mid and lower lung with small right pleural effusion.  There are groundglass and interstitial opacities in the right upper lobe    CT chest abdomen pelvis   Progression of the alveolar consolidation in the right mid and lower lung with moderate size right pleural effusion. There is progression of the airspace disease within the right upper lobe with patchy groundglass opacity left upper lobe and left lung base. Findings are compatible with multifocal.  Moderate vascular calcification of the  coronary arteries and aorta  Fatty infiltration of the liver with stable hypodense lesions within the liver compatible with cysts  Colonic diverticulosis without diverticulitis  Prior cholecystectomy and hysterectomy  Stable rim calcified mass in the right upper quadrant  Degenerative changes of the spine    Procedure(s) (LRB):  BRONCHOSCOPY (N/A)      Hospital Course:   Ms. Otero is a 82 yo w/pmhx of CAD, Vfib arrest in 10/2023 after outpatient PCI, T2DM, R lobe adenocarcinoma s/p lobectomy on 10/2023 who is admitted for recurrent pneumonia and belching. She was recenlty treated for a pneumonia at [facility] for 4 days and discharged on cefdinir and azithromycin. However, patient was unable to  her last day of antibiotics. Cxr revealed progression of the R mid and lower lung consolidation and small pleural effusion. Pulm was consulted and patient bronched on 12/12. Respiratory culture w/normal cece. 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 GI also consulted for belching and EGD relatively normal s/p esophageal dilation to see if that would help. Belching thought to be possibly 2/2 gastroparesis. Pulm plans for BAL on Friday while allowing for the plavix to wash out. Plavix was discontinued and patient was started on eliquis. She will take her last dose on 12/13 and no more doses until the BAL. She will return outpatient for the BAL and will be given instructions then on when to resume plavix. Since bacterial pneumonia was thought to be less likely, patient was discharged w/out antibiotics.          Goals of Care Treatment Preferences:  Code Status: Full Code    Living Will: Yes              Consults:   Consults (From admission, onward)          Status Ordering Provider     Inpatient consult to Pulmonology  Once        Provider:  Sudha Nuñez MD Completed ALLAN, DAVID     Inpatient consult to Gastroenterology  Once        Provider:  Pretty Salgado MD     Completed CANDACE HART            No new Assessment & Plan notes have been filed under this hospital service since the last note was generated.  Service: Hospital Medicine    Final Active Diagnoses:    Diagnosis Date Noted POA    PRINCIPAL PROBLEM:  Pneumonia [J18.9] 12/04/2023 Yes    Iron deficiency anemia due to chronic blood loss [D50.0] 12/12/2023 Yes    Multifocal pneumonia [J18.9] 12/12/2023 Yes    Epigastric discomfort [R10.13] 12/10/2023 Yes    Malignant neoplasm of lower lobe of right lung [C34.31] 12/01/2023 Yes    Cardiopulmonary arrest [I46.9] 10/13/2023 Yes      Problems Resolved During this Admission:       Discharged Condition: stable    Disposition: Home or Self Care    Follow Up:    Patient Instructions:      Ambulatory referral/consult to Outpatient Case Management   Referral Priority: Routine Referral Type: Consultation   Referral Reason: Specialty Services Required   Number of Visits Requested: 1       Significant Diagnostic Studies: Labs: BMP:   Recent Labs   Lab 12/12/23  0418 12/13/23  0641   * 124*    141   K 4.0 4.0    107   CO2 28 28   BUN 20 16   CREATININE 1.0 1.0   CALCIUM 8.3* 8.7    and CBC   Recent Labs   Lab 12/12/23  0418 12/13/23  0641   WBC 7.14 9.45   HGB 8.9* 9.5*   HCT 28.7* 31.3*    418       Pending Diagnostic Studies:       Procedure Component Value Units Date/Time    H. pylori antigen, stool [[id number]] Collected: 12/11/23 1643    Order Status: Sent Lab Status: In process Updated: 12/11/23 1649    Specimen: Stool            Medications:  Reconciled Home Medications:      Medication List        START taking these medications      ELIQUIS 2.5 mg Tab  Generic drug: apixaban  Take 1 tablet (2.5 mg total) by mouth 2 (two) times daily. for 1 dose     guaiFENesin 100 mg/5 ml 100 mg/5 mL syrup  Commonly known as: ROBITUSSIN  Take 10 mLs (200 mg total) by mouth 3 (three) times daily as needed for Cough.            CHANGE how you take these medications       metFORMIN 500 MG tablet  Commonly known as: GLUCOPHAGE  TAKE 1 TABLET(500 MG) BY MOUTH TWICE DAILY WITH MEALS  What changed: when to take this            CONTINUE taking these medications      albuterol 90 mcg/actuation inhaler  Commonly known as: VENTOLIN HFA  Inhale 2 puffs into the lungs every 4 (four) hours as needed for Wheezing or Shortness of Breath. Rescue     amiodarone 200 MG Tab  Commonly known as: PACERONE  Take 1 tablet (200 mg total) by mouth 2 (two) times daily.     aspirin 81 MG EC tablet  Commonly known as: ECOTRIN  Take 81 mg by mouth once daily.     famotidine 20 MG tablet  Commonly known as: PEPCID  Take 1 tablet (20 mg total) by mouth once daily. for 30 doses     gabapentin 100 MG capsule  Commonly known as: NEURONTIN  TAKE 1 CAPSULE(100 MG) BY MOUTH TWICE DAILY     HYDROcodone-acetaminophen 5-325 mg per tablet  Commonly known as: NORCO  Take 1 tablet by mouth every 6 (six) hours as needed for Pain.     ICAPS AREDS ORAL  Take 1 capsule by mouth 2 (two) times a day.     magnesium oxide 400 mg (241.3 mg magnesium) tablet  Commonly known as: MAG-OX  Take 400 mg by mouth once daily.     melatonin 5 mg Chew  Take 1 tablet by mouth nightly as needed (insomnia).     metoprolol succinate 25 MG 24 hr tablet  Commonly known as: TOPROL-XL  Take 25 mg by mouth once daily.     potassium chloride 20 mEq  Commonly known as: K-TAB  Take 1 tablet by mouth once daily.            STOP taking these medications      azithromycin 250 MG tablet  Commonly known as: Z-DEJAN     cefdinir 300 MG capsule  Commonly known as: OMNICEF     clopidogreL 75 mg tablet  Commonly known as: PLAVIX              Indwelling Lines/Drains at time of discharge:   Lines/Drains/Airways       Central Venous Catheter Line  Duration             Port A Cath Single Lumen Subclavian Right -- days                    Physical Exam:  Cardiac: RRR  Lungs: coarse breath sounds, on RA  Abdomen: soft, non-tender     Time spent on the discharge of  patient: 34 minutes         Harinder Lemon MD  Department of Hospital Medicine  [facility]

## 2023-12-14 NOTE — PROGRESS NOTES
C3 nurse spoke with Alexa Otero for a TCC post hospital discharge follow up call. The pt denies any new symptoms or complaints. The patient has a scheduled HOSFU with her PCP, Idalia Greco MD on 12/20/23 at 1530. No messages routed at this time.

## 2023-12-15 ENCOUNTER — ANESTHESIA (OUTPATIENT)
Dept: SURGERY | Facility: HOSPITAL | Age: 83
End: 2023-12-15
Payer: MEDICARE

## 2023-12-15 ENCOUNTER — HOSPITAL ENCOUNTER (OUTPATIENT)
Facility: HOSPITAL | Age: 83
Discharge: HOME | End: 2023-12-15
Attending: INTERNAL MEDICINE | Admitting: INTERNAL MEDICINE
Payer: MEDICARE

## 2023-12-15 ENCOUNTER — ANESTHESIA EVENT (OUTPATIENT)
Dept: SURGERY | Facility: HOSPITAL | Age: 83
End: 2023-12-15
Payer: MEDICARE

## 2023-12-15 VITALS
HEART RATE: 100 BPM | TEMPERATURE: 97 F | SYSTOLIC BLOOD PRESSURE: 137 MMHG | WEIGHT: 115 LBS | RESPIRATION RATE: 16 BRPM | HEART RATE: 107 BPM | HEIGHT: 63 IN | DIASTOLIC BLOOD PRESSURE: 79 MMHG | SYSTOLIC BLOOD PRESSURE: 104 MMHG | BODY MASS INDEX: 20.38 KG/M2 | OXYGEN SATURATION: 99 % | DIASTOLIC BLOOD PRESSURE: 67 MMHG | OXYGEN SATURATION: 100 %

## 2023-12-15 DIAGNOSIS — J18.9: ICD-10-CM

## 2023-12-15 DIAGNOSIS — J18.9 PNEUMONIA: ICD-10-CM

## 2023-12-15 LAB
ACID FAST MOD KINY STN SPEC: NORMAL
BACTERIA BLD CULT: NORMAL
BACTERIA BLD CULT: NORMAL
KOH PREP SPEC: NORMAL
KOH PREP SPEC: NORMAL
MYCOBACTERIUM SPEC QL CULT: NORMAL

## 2023-12-15 PROCEDURE — 25000242 PHARM REV CODE 250 ALT 637 W/ HCPCS: Performed by: INTERNAL MEDICINE

## 2023-12-15 PROCEDURE — 87102 FUNGUS ISOLATION CULTURE: CPT | Performed by: INTERNAL MEDICINE

## 2023-12-15 PROCEDURE — 31624 DX BRONCHOSCOPE/LAVAGE: CPT | Performed by: INTERNAL MEDICINE

## 2023-12-15 PROCEDURE — 27200944 HC BRONCH FORCEPS DISPOSABLE: Performed by: INTERNAL MEDICINE

## 2023-12-15 PROCEDURE — 31628 BRONCHOSCOPY/LUNG BX EACH: CPT | Mod: RT,,, | Performed by: INTERNAL MEDICINE

## 2023-12-15 PROCEDURE — 25000003 PHARM REV CODE 250: Performed by: INTERNAL MEDICINE

## 2023-12-15 PROCEDURE — 31623 DX BRONCHOSCOPE/BRUSH: CPT | Mod: 51,,, | Performed by: INTERNAL MEDICINE

## 2023-12-15 PROCEDURE — 87205 SMEAR GRAM STAIN: CPT | Performed by: INTERNAL MEDICINE

## 2023-12-15 PROCEDURE — 31624 DX BRONCHOSCOPE/LAVAGE: CPT | Mod: 51,,, | Performed by: INTERNAL MEDICINE

## 2023-12-15 PROCEDURE — 31623 DX BRONCHOSCOPE/BRUSH: CPT | Performed by: INTERNAL MEDICINE

## 2023-12-15 PROCEDURE — D9220A PRA ANESTHESIA: ICD-10-PCS | Mod: CRNA,,, | Performed by: NURSE ANESTHETIST, CERTIFIED REGISTERED

## 2023-12-15 PROCEDURE — 87210 SMEAR WET MOUNT SALINE/INK: CPT | Mod: 91 | Performed by: INTERNAL MEDICINE

## 2023-12-15 PROCEDURE — 37000009 HC ANESTHESIA EA ADD 15 MINS: Performed by: INTERNAL MEDICINE

## 2023-12-15 PROCEDURE — 25000003 PHARM REV CODE 250: Performed by: NURSE ANESTHETIST, CERTIFIED REGISTERED

## 2023-12-15 PROCEDURE — 87070 CULTURE OTHR SPECIMN AEROBIC: CPT | Performed by: INTERNAL MEDICINE

## 2023-12-15 PROCEDURE — 37000008 HC ANESTHESIA 1ST 15 MINUTES: Performed by: INTERNAL MEDICINE

## 2023-12-15 PROCEDURE — 31628 BRONCHOSCOPY/LUNG BX EACH: CPT | Performed by: INTERNAL MEDICINE

## 2023-12-15 PROCEDURE — D9220A PRA ANESTHESIA: Mod: ANES,,, | Performed by: ANESTHESIOLOGY

## 2023-12-15 PROCEDURE — 63600175 PHARM REV CODE 636 W HCPCS: Performed by: NURSE ANESTHETIST, CERTIFIED REGISTERED

## 2023-12-15 PROCEDURE — 87116 MYCOBACTERIA CULTURE: CPT | Mod: 59 | Performed by: INTERNAL MEDICINE

## 2023-12-15 PROCEDURE — 27200946 HC BRUSH, CYTOLOGY: Performed by: INTERNAL MEDICINE

## 2023-12-15 PROCEDURE — 27201114 HC TRAP (ANY): Performed by: INTERNAL MEDICINE

## 2023-12-15 PROCEDURE — 88305 TISSUE EXAM BY PATHOLOGIST: CPT | Mod: TC | Performed by: PATHOLOGY

## 2023-12-15 PROCEDURE — D9220A PRA ANESTHESIA: ICD-10-PCS | Mod: ANES,,, | Performed by: ANESTHESIOLOGY

## 2023-12-15 PROCEDURE — D9220A PRA ANESTHESIA: Mod: CRNA,,, | Performed by: NURSE ANESTHETIST, CERTIFIED REGISTERED

## 2023-12-15 PROCEDURE — 87206 SMEAR FLUORESCENT/ACID STAI: CPT | Performed by: INTERNAL MEDICINE

## 2023-12-15 RX ORDER — LIDOCAINE HYDROCHLORIDE 10 MG/ML
INJECTION, SOLUTION EPIDURAL; INFILTRATION; INTRACAUDAL; PERINEURAL
Status: DISCONTINUED | OUTPATIENT
Start: 2023-12-15 | End: 2023-12-15 | Stop reason: HOSPADM

## 2023-12-15 RX ORDER — ACETAMINOPHEN 325 MG/1
650 TABLET ORAL EVERY 6 HOURS PRN
Status: DISCONTINUED | OUTPATIENT
Start: 2023-12-15 | End: 2023-12-15 | Stop reason: HOSPADM

## 2023-12-15 RX ORDER — ALBUTEROL SULFATE 0.83 MG/ML
2.5 SOLUTION RESPIRATORY (INHALATION) ONCE
Status: DISCONTINUED | OUTPATIENT
Start: 2023-12-15 | End: 2023-12-15 | Stop reason: HOSPADM

## 2023-12-15 RX ORDER — LIDOCAINE HYDROCHLORIDE 40 MG/ML
10 SOLUTION TOPICAL ONCE
Status: CANCELLED | OUTPATIENT
Start: 2023-12-15 | End: 2023-12-15

## 2023-12-15 RX ORDER — LIDOCAINE HYDROCHLORIDE 20 MG/ML
JELLY TOPICAL
Status: DISCONTINUED | OUTPATIENT
Start: 2023-12-15 | End: 2023-12-15 | Stop reason: HOSPADM

## 2023-12-15 RX ORDER — ONDANSETRON 2 MG/ML
INJECTION INTRAMUSCULAR; INTRAVENOUS
Status: DISCONTINUED | OUTPATIENT
Start: 2023-12-15 | End: 2023-12-15

## 2023-12-15 RX ORDER — LIDOCAINE HYDROCHLORIDE 10 MG/ML
20 INJECTION INFILTRATION; PERINEURAL ONCE
Status: CANCELLED | OUTPATIENT
Start: 2023-12-15 | End: 2023-12-15

## 2023-12-15 RX ORDER — LIDOCAINE HYDROCHLORIDE 40 MG/ML
5 INJECTION, SOLUTION RETROBULBAR ONCE
Status: COMPLETED | OUTPATIENT
Start: 2023-12-15 | End: 2023-12-15

## 2023-12-15 RX ORDER — LIDOCAINE HYDROCHLORIDE 10 MG/ML
20 INJECTION INFILTRATION; PERINEURAL ONCE
Status: DISCONTINUED | OUTPATIENT
Start: 2023-12-15 | End: 2023-12-15 | Stop reason: HOSPADM

## 2023-12-15 RX ORDER — PROPOFOL 10 MG/ML
VIAL (ML) INTRAVENOUS
Status: DISCONTINUED | OUTPATIENT
Start: 2023-12-15 | End: 2023-12-15

## 2023-12-15 RX ORDER — ALBUTEROL SULFATE 0.83 MG/ML
2.5 SOLUTION RESPIRATORY (INHALATION) ONCE
Status: COMPLETED | OUTPATIENT
Start: 2023-12-15 | End: 2023-12-15

## 2023-12-15 RX ORDER — LIDOCAINE HYDROCHLORIDE 20 MG/ML
JELLY TOPICAL
Status: CANCELLED | OUTPATIENT
Start: 2023-12-15

## 2023-12-15 RX ADMIN — ALBUTEROL SULFATE 2.5 MG: 2.5 SOLUTION RESPIRATORY (INHALATION) at 08:12

## 2023-12-15 RX ADMIN — PROPOFOL 40 MG: 10 INJECTION, EMULSION INTRAVENOUS at 09:12

## 2023-12-15 RX ADMIN — PROPOFOL 30 MG: 10 INJECTION, EMULSION INTRAVENOUS at 09:12

## 2023-12-15 RX ADMIN — ACETAMINOPHEN 650 MG: 325 TABLET ORAL at 02:12

## 2023-12-15 RX ADMIN — LIDOCAINE HYDROCHLORIDE 5 ML: 40 INJECTION, SOLUTION RETROBULBAR; TOPICAL at 08:12

## 2023-12-15 RX ADMIN — PROPOFOL 50 MG: 10 INJECTION, EMULSION INTRAVENOUS at 09:12

## 2023-12-15 RX ADMIN — SODIUM CHLORIDE: 0.9 INJECTION, SOLUTION INTRAVENOUS at 09:12

## 2023-12-15 RX ADMIN — GLYCOPYRROLATE 0.2 MG: 0.2 INJECTION, SOLUTION INTRAMUSCULAR; INTRAVITREAL at 09:12

## 2023-12-15 RX ADMIN — ONDANSETRON 4 MG: 2 INJECTION INTRAMUSCULAR; INTRAVENOUS at 09:12

## 2023-12-15 NOTE — ANESTHESIA PREPROCEDURE EVALUATION
12/15/2023  Alexa Otero is a 83 y.o., female.      Tobacco Use:  The patient  reports that she quit smoking about 42 years ago. Her smoking use included cigarettes. She started smoking about 62 years ago. She has a 20.0 pack-year smoking history. She has never used smokeless tobacco.     Results for orders placed or performed during the hospital encounter of 12/10/23   EKG 12-lead    Collection Time: 12/10/23 10:23 AM    Narrative    Test Reason : R06.02,    Vent. Rate : 095 BPM     Atrial Rate : 095 BPM     P-R Int : 128 ms          QRS Dur : 064 ms      QT Int : 376 ms       P-R-T Axes : 060 041 088 degrees     QTc Int : 472 ms    Sinus rhythm with Premature supraventricular complexes  Anteroseptal infarct ,age undetermined  Abnormal ECG  When compared with ECG of 03-DEC-2023 18:38,  Premature supraventricular complexes are now Present  Anteroseptal infarct is now Present  Confirmed by Luis Eduardo LAWSON, Narciso SAMANIEGO ([id number]) on 12/10/2023 9:37:25 PM    Referred By: [email or web address]   SELF           Confirmed By:Narciso Hoff MD               Lab Results   Component Value Date    WBC 9.45 12/13/2023    HGB 9.5 (L) 12/13/2023    HCT 31.3 (L) 12/13/2023    MCV 93 12/13/2023     12/13/2023     BMP  Lab Results   Component Value Date     12/13/2023    K 4.0 12/13/2023     12/13/2023    CO2 28 12/13/2023    BUN 16 12/13/2023    CREATININE 1.0 12/13/2023    CALCIUM 8.7 12/13/2023    ANIONGAP 6 (L) 12/13/2023     (H) 12/13/2023     (H) 12/12/2023     12/11/2023       Results for orders placed during the hospital encounter of 10/13/23    Echo    Interpretation Summary    Left Ventricle: The left ventricle is normal in size. Normal wall thickness. Normal wall motion. There is normal systolic function with a visually estimated ejection fraction of 55 - 60%. Grade I diastolic dysfunction.     Right Ventricle: Normal right ventricular cavity size. Wall thickness is normal. Right ventricle wall motion  is normal. Systolic function is normal.    Aortic Valve: There is moderate aortic valve sclerosis.    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 There is mild mitral annular calcification present. There is no stenosis. The mean pressure gradient across the mitral valve is 3 mmHg at a heart rate of  bpm. There is mild regurgitation.    Tricuspid Valve: There is mild regurgitation.  No pulmonary hypertension.    IVC/SVC: Normal venous pressure at 3 mmHg.     10/27/23 08:52   Group & Rh A POS   INDIRECT ELIAS NEG   Specimen Outdate 11/10/2023 23:59       Pre-op Assessment    I have reviewed the Patient Summary Reports.     I have reviewed the Nursing Notes. I have reviewed the NPO Status.   I have reviewed the Medications.     Review of Systems  Anesthesia Hx:  No problems with previous Anesthesia             Denies Family Hx of Anesthesia complications.   Personal Hx of Anesthesia complications, Post-Operative Nausea/Vomiting, in the past, but not with recent anesthetics / prophylaxis                    Social:  Alcohol Use, Former Smoker       Hematology/Oncology:                   Hematology Comments: Plavix Therapy       --  Cancer in past history:              surgery and chemotherapy   Oncology Comments: Extra-ovarian endometrioid adenocarcinoma    Squamous cell carcinoma     EENT/Dental:  EENT/Dental Normal  Macular degeneration Eyes: Visual Impairment   Has Bilateral and S/P Extraction - Bilateral Catarract                  Cardiovascular:     Hypertension, poorly controlled   CAD  asymptomatic CABG/stent        hyperlipidemia   ECG has been reviewed. Hx of cardiac arrest secondary to ventricular fibrillation during cardiac stent placement     Patient cleared for subsequent surgery by Dr. Julio                          Pulmonary:         History of chronic cough    Hx Aspiration  pneumonia of right lung               Renal/:  Chronic Renal Disease        Kidney Function/Disease, Chronic Kidney Disease (CKD) , CKD Stage III (GFR 30-59)            Hepatic/GI:     GERD, well controlled Liver Disease,  History of colon polyps    Pancreatic pseudocyst/cyst    Mid-epigastric pain   Esophageal / Stomach Disorders Esophageal Disorder, Kwon's Esophagus        Pancreatic Disease   Musculoskeletal:  Arthritis      Joint Disease:  Arthritis, Osteoarthritis     Spine Disorders: lumbar Chronic Pain           Neurological:    Neuromuscular Disease,       Sciatica    Weakness of right lower extremity    Osteoarthritis                           Endocrine:  Diabetes, type 2           Dermatological:  Skin Normal    Psych:  Psychiatric Normal                    Physical Exam  General: Well nourished, Cooperative, Alert and Oriented    Airway:  Mallampati: III / II  Mouth Opening: Normal  TM Distance: > 6 cm  Tongue: Normal  Neck ROM: Normal ROM    Dental:  Intact, Caps / Implants    Chest/Lungs:  Clear to auscultation, Normal Respiratory Rate    Heart:  Rate: Normal  Rhythm: Regular Rhythm        Anesthesia Plan  Type of Anesthesia, risks & benefits discussed:    Anesthesia Type: Gen Natural Airway  Intra-op Monitoring Plan: Standard ASA Monitors  Post Op Pain Control Plan:   (medical reason for not using multimodal pain management)  Induction:  IV  Airway Plan: Direct and Video, Post-Induction  Informed Consent: Informed consent signed with the Patient and all parties understand the risks and agree with anesthesia plan.  All questions answered. Patient consented to blood products? Yes  ASA Score: 3 Emergent  Anesthesia Plan Notes:     GNA  Propofol  POM  Zofran     Ready For Surgery From Anesthesia Perspective.     .

## 2023-12-15 NOTE — PROCEDURES
Bronchoscopy    Indication:  Concern of further pneumonic adenocarcinoma  Medications:  Per anesthesia  Specimens:  Lavage, transbronchial brushes, 9 transbronchial lung tissue biopsies  Complications:  None    The bronchoscope was introduced through the left nare. The hypopharynx and larynx were unremarkable. The vocal cords were midline and opposed well. The trachea was midline. The dom was sharp. The right upper lobe and right middle lobe subdivided into the usual subsegments.  The surgical closure of the right lower lobe bronchus was viewed in his healing well.  The orifice to the right middle lobe appears more irregular and with submucosal hoping them was present previously.  I am not sure if this is just rotational changes from the right lower lobe resection or true submucosal disease The left upper lobe and left lower lobe subdivided into the usual subsegments. There were no endobronchial or submucosal abnormalities noted.  The scope was wedged into the right middle lobe and a lavage of 60 cc was instilled and 40 was returned.  This was followed by 2 transbronchial brushes and non transbronchial lung tissue biopsies.  The specimens were sent for C and S, KOH and fungal culture, AFB and mycobacterial culture, cytology,and histopathology. A post procedure x-ray has been ordered.  The patient did have a little more bleeding then 1 would usually expect but she would only been off the Eliquis for 36 hours.  The patient tolerated the procedure well.

## 2023-12-15 NOTE — ANESTHESIA POSTPROCEDURE EVALUATION
Anesthesia Post Evaluation    Patient: Alexa Otero    Procedure(s) Performed: Procedure(s) (LRB):  BRONCHOSCOPY, WITH FLUOROSCOPY (N/A)    Final Anesthesia Type: MAC      Patient location during evaluation: GI PACU  Patient participation: Yes- Able to Participate  Level of consciousness: awake and alert and oriented  Post-procedure vital signs: reviewed and stable  Pain management: adequate  Airway patency: patent    PONV status at discharge: No PONV  Anesthetic complications: no      Cardiovascular status: blood pressure returned to baseline  Respiratory status: unassisted, spontaneous ventilation and room air  Hydration status: euvolemic  Follow-up not needed.              Vitals Value Taken Time   /68 12/15/23 1035   Temp 36.6 12/15/23 1040   Pulse 102 12/15/23 1038   Resp 14 12/15/23 1040   SpO2 99 % 12/15/23 1038   Vitals shown include unvalidated device data.      No case tracking events are documented in the log.      Pain/Jess Score: No data recorded

## 2023-12-15 NOTE — H&P
History and Physical      PATIENT NAME: Alexa Otero  MRN: [id number]  TODAY'S DATE: 12/15/2023  10:20 AM  ADMIT DATE: 12/15/2023  AGE: 83 y.o. 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  She developed a right middle lobe right upper lobe pneumonic infiltrate with parapneumonic effusion last month.  She was treated with 4 days of antibiotics in the hospital and then did not get her 5th day as an outpatient.  The CT shows significant progression of the right middle lobe process with increasing infiltrate in the left lower lobe and 2 cavitary nodular areas in the left upper lobe that are fairly unchanged from November but were not there in August.  She also has a moderate fusion again.  This was a parapneumonic effusion last month.  The patient is coughing up a handful of clear mucus every hour.  This is what she was coughing up before her right lower lobe was resected.  I am very concerned that this is more of her adenocarcinoma.  The patient had recent cardiac stents placed right before her right lower lobe resection and is on dual antiplatelet therapy.  The patient does not appear toxic.  She was admitted to the hospital with abdominal pain.      REVIEW OF SYSTEMS  GENERAL: Feeling ok  EYES: Vision is good.  ENT: No sinusitis or pharyngitis.   HEART: No chest pain or palpitations.  LUNGS:  She is expectorating cupfuls of clear mucus a day.  GI:  She feels like she has gas but is not distended.  She has been nauseated.  She feels worse after she eats.  : No dysuria, hesitancy, or nocturia.  SKIN: No lesions or rashes.  MUSCULOSKELETAL: No joint pain or myalgias.  NEURO: No headaches or neuropathy.  LYMPH: No edema or adenopathy.  PSYCH: No anxiety or depression.  ENDO: No weight change.    No change in the patient's Past Medical History, Past Surgical History, Social History or Family History since admission.    VITAL  SIGNS (MOST RECENT)  Temp: 98.7 °F (37.1 °C) (12/15/23 0730)  Pulse: 88 (12/15/23 0730)  Resp: 18 (12/15/23 0730)  BP: (!) 187/83 (12/15/23 0731)  SpO2: 95 % (12/15/23 0730)        WEIGHT  Wt Readings from Last 1 Encounters:   12/15/23 52.2 kg (115 lb)       PHYSICAL EXAM  GENERAL: Older patient in no distress.  HEENT: Pupils equal and reactive. Extraocular movements intact. Nose intact. Pharynx moist.  NECK: Supple.   HEART: Regular rate and rhythm. No murmur or gallop auscultated.  LUNGS:  There are dense crackles in both bases.  There are rhonchi over the right anterior thorax.  Lung excursion symmetrical. No change in fremitus.  ABDOMEN: Bowel sounds present. Non-tender, no masses palpated.  : Normal anatomy.  EXTREMITIES: Normal muscle tone and joint movement, no cyanosis or clubbing.   LYMPHATICS: No adenopathy palpated, no edema.  SKIN: Dry, intact, no lesions.   NEURO: Cranial nerves II-XII intact. Motor strength 5/5 bilaterally, upper and lower extremities.  PSYCH: Appropriate affect            CARDIAC PROFILE (LAST 24 HOURS)  Recent Labs   Lab 12/10/23  1105   *   TROPONINIHS 9.7       LAST 7 DAYS MICROBIOLOGY   Microbiology Results (last 7 days)       ** No results found for the last 168 hours. **            MOST RECENT IMAGING  CT Chest Abdomen Pelvis With IV Contrast (XPD) NO Oral Contrast   ADDENDUM #1     This is a correction to the impression    Findings are compatible with multifocal pneumonia.    Electronically signed by:  Chichi Gann MD  12/10/2023 01:42 PM [facility] Workstation: [id number]     ORIGINAL REPORT     CMS MANDATED QUALITY DATA - CT RADIATION - 436    All CT scans at this facility utilize dose modulation, iterative reconstruction, and/or weight based dosing when appropriate to reduce radiation dose to as low as reasonably achievable.    Reason: Upper Abdominal pain , lung cancer, right thoracotomy    TECHNIQUE: CT thorax, abdomen, and pelvis with 100 mL Omnipaque  350.    COMPARISON: CTA chest dated 12/4/2023 PET/CT dated 8/15/2023    CT THORAX:  Base of the neck is unremarkable. There are stable tiny thyroid nodules. There is no supraclavicular adenopathy.  There is a right IJ Port-A-Cath with the tip in the superior vena cava.    LUNGS: Progression of the alveolar consolidation in the right mid and lower lobe with moderate size right pleural effusion. There is progression of the groundglass and interstitial opacities in the right upper lobe.  Patchy groundglass opacities in the lateral left upper lobe and posterior left lower lobe.  There are mild emphysematous changes.    Heart is normal in size. There is multivessel coronary artery calcification. Aorta is normal in caliber.  Mediastinum: There is no pathologic adenopathy. There are mildly prominent mediastinal lymph nodes which may be reactive.    There are bilateral rim calcified breast implants. The musculature is normal.  There are degenerative changes of the spine. There are no lytic or blastic lesions.    CT ABDOMEN:  Liver is normal in size and contour. The liver is hypodense compatible with fatty infiltration.  There is a stable 13 mm low-density lesion within the anterior segment of the right lobe of the liver. There is a 4 cm cyst within the posterior segment of the right lobe of the liver.  There is no biliary duct dilatation. The gallbladder is absent.    Spleen is normal in size and contour    Pancreas demonstrate normal enhancement. There is no focal mass.    There are no thick-walled or dilated bowel loops. There is colonic diverticulosis without diverticulitis.  There is no mesenteric or retroperitoneal adenopathy.  There is a stable 3.1 cm rim calcified mass in the right upper quadrant  The aorta is normal in caliber. There is moderate vascular calcification.    There is no free fluid or free air.  The musculature is normal.  There is rightward curvature of the lumbar spine. There are degenerative changes  of the spine without lytic or blastic lesions.    CT PELVIS:  The bladder is normal. There is colonic diverticulosis. There is no pelvic adenopathy. There is hardware within the right hip.    IMPRESSION:  Progression of the alveolar consolidation in the right mid and lower lung with moderate size right pleural effusion. There is progression of the airspace disease within the right upper lobe with patchy groundglass opacity left upper lobe and left lung base. Findings are compatible with multifocal.    Moderate vascular calcification of the coronary arteries and aorta    Fatty infiltration of the liver with stable hypodense lesions within the liver compatible with cysts    Colonic diverticulosis without diverticulitis    Prior cholecystectomy and hysterectomy    Stable rim calcified mass in the right upper quadrant    Degenerative changes of the spine    Electronically signed by:  Chichi Gann MD  12/10/2023 01:29 PM CST Workstation: [id number]  X-Ray Chest AP Portable  Portable chest x-ray 11:06 AM is compared to prior study dated 12/6/2023    Clinical history is cough and shortness of breath    There is a right IJ Port-A-Cath in stable position.  The cardiomediastinal silhouette is stable.  There is progression of the alveolar consolidation in the right mid and lower lung with small right pleural effusion. There are stable interstitial and groundglass opacity in the right upper lobe.  The left lung is clear.  There are bilateral breast implants. There are no acute osseous abnormalities.    IMPRESSION: Progression of the alveolar consolidation in the right mid and lower lung with small right pleural effusion.  There are groundglass and interstitial opacities in the right upper lobe    Electronically signed by:  Chichi Gann MD  12/10/2023 11:54 AM CST Workstation: [id number]      CURRENT VISIT EKG  Results for orders placed or performed during the hospital encounter of 12/10/23   EKG 12-lead    Narrative     Test Reason : R06.02,    Vent. Rate : 095 BPM     Atrial Rate : 095 BPM     P-R Int : 128 ms          QRS Dur : 064 ms      QT Int : 376 ms       P-R-T Axes : 060 041 088 degrees     QTc Int : 472 ms    Sinus rhythm with Premature supraventricular complexes  Anteroseptal infarct ,age undetermined  Abnormal ECG  When compared with ECG of 03-DEC-2023 18:38,  Premature supraventricular complexes are now Present  Anteroseptal infarct is now Present  Confirmed by Luis Eduardo LAWSON, Narciso SAMANIEGO ([id number]) on 12/10/2023 9:37:25 PM    Referred By: [email or web address]   SELF           Confirmed By:Narciso Hoff MD       ECHOCARDIOGRAM RESULTS  Results for orders placed during the hospital encounter of 10/13/23    Echo    Interpretation Summary    Left Ventricle: The left ventricle is normal in size. Normal wall thickness. Normal wall motion. There is normal systolic function with a visually estimated ejection fraction of 55 - 60%. Grade I diastolic dysfunction.    Right Ventricle: Normal right ventricular cavity size. Wall thickness is normal. Right ventricle wall motion  is normal. Systolic function is normal.    Aortic Valve: There is moderate aortic valve sclerosis.    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 There is mild mitral annular calcification present. There is no stenosis. The mean pressure gradient across the mitral valve is 3 mmHg at a heart rate of  bpm. There is mild regurgitation.    Tricuspid Valve: There is mild regurgitation.  No pulmonary hypertension.    IVC/SVC: Normal venous pressure at 3 mmHg.        The patient is on room air                IMPRESSION AND PLAN  Progression of right upper lobe right middle lobe left lower lobe and left upper lobe infiltrates.  The right middle lobe is densely consolidated.  There is a moderate right pleural effusion.  There is more left lower lobe infiltrate.  I am very concerned that this could be her pneumonic adenocarcinoma as she is bringing up handfuls of clear  mucus which is what she did before her resection.  However it is very soon for this to be progressing this fast.    Bronch with transbronchial biopsies of GLORY Christian MD  Date of Service: 12/15/2023  10:20 AM

## 2023-12-15 NOTE — TRANSFER OF CARE
"Anesthesia Transfer of Care Note    Patient: Alexa Otero    Procedure(s) Performed: Procedure(s) (LRB):  BRONCHOSCOPY, WITH FLUOROSCOPY (N/A)    Patient location: GI    Anesthesia Type: MAC    Transport from OR: Transported from OR on room air with adequate spontaneous ventilation    Post pain: adequate analgesia    Post assessment: no apparent anesthetic complications and tolerated procedure well    Post vital signs: stable    Level of consciousness: awake    Nausea/Vomiting: no nausea/vomiting    Complications: none    Transfer of care protocol was followed      Last vitals: Visit Vitals  BP (!) 187/83   Pulse 88   Temp 37.1 °C (98.7 °F)   Resp 16   Ht 5' 3" (1.6 m)   Wt 52.2 kg (115 lb)   SpO2 95%   Breastfeeding No   BMI 20.37 kg/m²     "

## 2023-12-17 LAB
BACTERIA SPEC AEROBE CULT: NORMAL
GRAM STN SPEC: NORMAL

## 2023-12-19 ENCOUNTER — TELEPHONE (OUTPATIENT)
Dept: PULMONOLOGY | Facility: CLINIC | Age: 83
End: 2023-12-19

## 2023-12-20 ENCOUNTER — OFFICE VISIT (OUTPATIENT)
Dept: FAMILY MEDICINE | Facility: CLINIC | Age: 83
End: 2023-12-20
Payer: MEDICARE

## 2023-12-20 VITALS
HEIGHT: 63 IN | SYSTOLIC BLOOD PRESSURE: 137 MMHG | DIASTOLIC BLOOD PRESSURE: 60 MMHG | BODY MASS INDEX: 20.43 KG/M2 | OXYGEN SATURATION: 96 % | HEART RATE: 94 BPM | RESPIRATION RATE: 12 BRPM | WEIGHT: 115.31 LBS | TEMPERATURE: 98 F

## 2023-12-20 DIAGNOSIS — Z90.2 STATUS POST LOBECTOMY OF LUNG: ICD-10-CM

## 2023-12-20 DIAGNOSIS — C56.1 CARCINOMA OF RIGHT OVARY: ICD-10-CM

## 2023-12-20 DIAGNOSIS — N18.30 CONTROLLED TYPE 2 DIABETES MELLITUS WITH STAGE 3 CHRONIC KIDNEY DISEASE, WITHOUT LONG-TERM CURRENT USE OF INSULIN: ICD-10-CM

## 2023-12-20 DIAGNOSIS — I15.2 HYPERTENSION ASSOCIATED WITH DIABETES: ICD-10-CM

## 2023-12-20 DIAGNOSIS — Z95.820 S/P ANGIOPLASTY WITH STENT: ICD-10-CM

## 2023-12-20 DIAGNOSIS — I25.10 CORONARY ARTERY DISEASE, UNSPECIFIED VESSEL OR LESION TYPE, UNSPECIFIED WHETHER ANGINA PRESENT, UNSPECIFIED WHETHER NATIVE OR TRANSPLANTED HEART: ICD-10-CM

## 2023-12-20 DIAGNOSIS — K21.9 GASTROESOPHAGEAL REFLUX DISEASE, UNSPECIFIED WHETHER ESOPHAGITIS PRESENT: Primary | ICD-10-CM

## 2023-12-20 DIAGNOSIS — E78.5 HYPERLIPIDEMIA ASSOCIATED WITH TYPE 2 DIABETES MELLITUS: ICD-10-CM

## 2023-12-20 DIAGNOSIS — C34.31 MALIGNANT NEOPLASM OF LOWER LOBE OF RIGHT LUNG: ICD-10-CM

## 2023-12-20 DIAGNOSIS — G47.00 INSOMNIA, UNSPECIFIED TYPE: ICD-10-CM

## 2023-12-20 DIAGNOSIS — J69.0 ASPIRATION PNEUMONIA OF RIGHT LUNG, UNSPECIFIED ASPIRATION PNEUMONIA TYPE, UNSPECIFIED PART OF LUNG: ICD-10-CM

## 2023-12-20 DIAGNOSIS — E11.59 HYPERTENSION ASSOCIATED WITH DIABETES: ICD-10-CM

## 2023-12-20 DIAGNOSIS — E11.22 CONTROLLED TYPE 2 DIABETES MELLITUS WITH STAGE 3 CHRONIC KIDNEY DISEASE, WITHOUT LONG-TERM CURRENT USE OF INSULIN: ICD-10-CM

## 2023-12-20 DIAGNOSIS — E11.69 HYPERLIPIDEMIA ASSOCIATED WITH TYPE 2 DIABETES MELLITUS: ICD-10-CM

## 2023-12-20 PROCEDURE — 1160F PR REVIEW ALL MEDS BY PRESCRIBER/CLIN PHARMACIST DOCUMENTED: ICD-10-PCS | Mod: HCNC,CPTII,S$GLB, | Performed by: FAMILY MEDICINE

## 2023-12-20 PROCEDURE — 99999 PR PBB SHADOW E&M-EST. PATIENT-LVL III: ICD-10-PCS | Mod: PBBFAC,HCNC,, | Performed by: FAMILY MEDICINE

## 2023-12-20 PROCEDURE — 99214 OFFICE O/P EST MOD 30 MIN: CPT | Mod: HCNC,S$GLB,, | Performed by: FAMILY MEDICINE

## 2023-12-20 PROCEDURE — 3075F PR MOST RECENT SYSTOLIC BLOOD PRESS GE 130-139MM HG: ICD-10-PCS | Mod: HCNC,CPTII,S$GLB, | Performed by: FAMILY MEDICINE

## 2023-12-20 PROCEDURE — 1160F RVW MEDS BY RX/DR IN RCRD: CPT | Mod: HCNC,CPTII,S$GLB, | Performed by: FAMILY MEDICINE

## 2023-12-20 PROCEDURE — 3078F PR MOST RECENT DIASTOLIC BLOOD PRESSURE < 80 MM HG: ICD-10-PCS | Mod: HCNC,CPTII,S$GLB, | Performed by: FAMILY MEDICINE

## 2023-12-20 PROCEDURE — 1125F AMNT PAIN NOTED PAIN PRSNT: CPT | Mod: HCNC,CPTII,S$GLB, | Performed by: FAMILY MEDICINE

## 2023-12-20 PROCEDURE — 3075F SYST BP GE 130 - 139MM HG: CPT | Mod: HCNC,CPTII,S$GLB, | Performed by: FAMILY MEDICINE

## 2023-12-20 PROCEDURE — 1111F DSCHRG MED/CURRENT MED MERGE: CPT | Mod: HCNC,CPTII,S$GLB, | Performed by: FAMILY MEDICINE

## 2023-12-20 PROCEDURE — 1125F PR PAIN SEVERITY QUANTIFIED, PAIN PRESENT: ICD-10-PCS | Mod: HCNC,CPTII,S$GLB, | Performed by: FAMILY MEDICINE

## 2023-12-20 PROCEDURE — 99999 PR PBB SHADOW E&M-EST. PATIENT-LVL III: CPT | Mod: PBBFAC,HCNC,, | Performed by: FAMILY MEDICINE

## 2023-12-20 PROCEDURE — 1157F PR ADVANCE CARE PLAN OR EQUIV PRESENT IN MEDICAL RECORD: ICD-10-PCS | Mod: HCNC,CPTII,S$GLB, | Performed by: FAMILY MEDICINE

## 2023-12-20 PROCEDURE — 99214 PR OFFICE/OUTPT VISIT, EST, LEVL IV, 30-39 MIN: ICD-10-PCS | Mod: HCNC,S$GLB,, | Performed by: FAMILY MEDICINE

## 2023-12-20 PROCEDURE — 3078F DIAST BP <80 MM HG: CPT | Mod: HCNC,CPTII,S$GLB, | Performed by: FAMILY MEDICINE

## 2023-12-20 PROCEDURE — 1159F MED LIST DOCD IN RCRD: CPT | Mod: HCNC,CPTII,S$GLB, | Performed by: FAMILY MEDICINE

## 2023-12-20 PROCEDURE — 1111F PR DISCHARGE MEDS RECONCILED W/ CURRENT OUTPATIENT MED LIST: ICD-10-PCS | Mod: HCNC,CPTII,S$GLB, | Performed by: FAMILY MEDICINE

## 2023-12-20 PROCEDURE — 1157F ADVNC CARE PLAN IN RCRD: CPT | Mod: HCNC,CPTII,S$GLB, | Performed by: FAMILY MEDICINE

## 2023-12-20 PROCEDURE — 1159F PR MEDICATION LIST DOCUMENTED IN MEDICAL RECORD: ICD-10-PCS | Mod: HCNC,CPTII,S$GLB, | Performed by: FAMILY MEDICINE

## 2023-12-20 RX ORDER — OMEPRAZOLE 40 MG/1
40 CAPSULE, DELAYED RELEASE ORAL DAILY
Qty: 90 CAPSULE | Refills: 3 | Status: SHIPPED | OUTPATIENT
Start: 2023-12-20 | End: 2024-03-20 | Stop reason: ALTCHOICE

## 2023-12-20 RX ORDER — LORAZEPAM 1 MG/1
1 TABLET ORAL EVERY 6 HOURS PRN
Qty: 30 TABLET | Refills: 2 | Status: SHIPPED | OUTPATIENT
Start: 2023-12-20 | End: 2024-03-05 | Stop reason: SDUPTHER

## 2023-12-20 RX ORDER — CLOPIDOGREL BISULFATE 75 MG/1
75 TABLET ORAL DAILY
COMMUNITY
Start: 2023-12-20 | End: 2024-03-20 | Stop reason: SDUPTHER

## 2023-12-20 RX ORDER — BENAZEPRIL HYDROCHLORIDE 40 MG/1
TABLET ORAL
Qty: 45 TABLET | Refills: 3 | COMMUNITY
Start: 2023-12-20 | End: 2024-03-20 | Stop reason: SDUPTHER

## 2023-12-20 NOTE — TELEPHONE ENCOUNTER
The patient's biopsy shows atypical adenomatous hyperplasia which is undoubtedly her well differentiated lepidic adenocarcinoma but the pathologist did not feel they had enough tissue to call this.  This is obviously not a pneumonia.  Spoke with Dr. Cassidy who will get her into his office soon so that chemotherapy can be begun.

## 2023-12-20 NOTE — PROGRESS NOTES
Subjective:       Patient ID: Alexa Otero is a 83 y.o. female.    Chief Complaint: Transitional Care    HPI  Review of Systems   Constitutional:  Positive for fatigue and unexpected weight change. Negative for fever.   Respiratory:  Negative for chest tightness and shortness of breath.    Cardiovascular:  Negative for chest pain, palpitations and leg swelling.   Gastrointestinal:  Negative for abdominal pain.   Musculoskeletal:  Negative for arthralgias.   Neurological:  Positive for weakness. Negative for dizziness, syncope, light-headedness and headaches.       Patient Active Problem List   Diagnosis    Sciatica    Syncope and collapse    Polycythemia, secondary    Hyperlipidemia associated with type 2 diabetes mellitus    Controlled type 2 diabetes mellitus with stage 3 chronic kidney disease, without long-term current use of insulin    Hypertension associated with diabetes    Vitamin D deficiency disease    Gastroesophageal reflux disease    History of Kwon's esophagus    Pancreatic pseudocyst/cyst    Hepatic cyst    Low back pain radiating to both legs    Primary osteoarthritis of right ankle    Kwon's esophagus    Chronic low back pain    Dupuytren's contracture of both hands    Right lower quadrant abdominal swelling, mass and lump    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     Closed fracture of femur, intertrochanteric, right, with routine healing, subsequent encounter    Right hip pain    Weakness of right lower extremity    Impaired functional mobility and activity tolerance    Maintenance chemotherapy    Atherosclerosis of aorta    History of colon polyps    Hypomagnesemia    Hypokalemia    Cardiopulmonary arrest    ACP (advance care planning)    Aspiration pneumonia of right lung    Paroxysmal atrial fibrillation    Lung infiltrate    Status post lobectomy of lung    Malignant neoplasm of lower lobe of right lung    Elevated troponin    Pneumonia    Pleural effusion    Bronchogenic  carcinoma    Sepsis    Epigastric discomfort    Iron deficiency anemia due to chronic blood loss    Multifocal pneumonia     Patient is here for a chronic conditions follow up.    Admitted 10/2023 [facility] for CP process after CAD stenting.  Electrolytes (hypomagnesemia and hypokalemia) heavily replaced as these were thought to be the cause of patient's VFib arrest. Patient also continued on empiric antibiotic therapy for RLL infiltrate (chronic, with suspected underlying jhon-process).       PET scan 8/23 showed hypermetabolic right lobe lesion. Dr. Christian did bronchoscopy with path showing necrotic cells.  Fungal and AFB cultures were negative. The patient reports a 14 month duration of cough with clear to whitish phlegm, mostly occurring while lying down at night.  On October 31, 2023, the patient was admitted to [facility] underwent right video-assisted thorascopic evaluation.  The right lung lower lobe was found to be extremely firm and not amenable to wedge biopsy, however there were no visible contraindications to proceeding with right lung lower lobectomy.  The patient underwent a right lung lower lobectomy via thoracotomy in the same setting.  She tolerated the procedure well & was extubated at the end of the operation.  She has had a progressive recovery, punctuated by acute anemia secondary to the expected blood loss of surgery and an episode of paroxysmal atrial fibrillation which was converted to sinus rhythm with medical therapy.  The Cardiology Service assisted in managing her atrial fibrillation with the addition of amiodarone.  Her hemoglobin level remained above transfusion threshold.  On POD #4, her chest tubes were removed.      Admitted 12/6/23 for right pneumonia with pleural effusion . Thoracentesis performed with 650 cc removed, cx neg. Responded to abx and d/c'd.  Readmitted pneumonia 12/15/23.  Cxr revealed progression of the R mid and lower lung consolidation and small  pleural effusion. Pulm was consulted and patient bronched on 12/12. Respiratory culture w/normal cece. 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 GI also consulted for belching and EGD relatively normal s/p esophageal dilation to see if that would help. Belching thought to be possibly 2/2 gastroparesis. Pulm plans for BAL on Friday while allowing for the plavix to wash out. Plavix was discontinued and patient was started on eliquis. She will take her last dose on 12/13 and no more doses until the BAL. She will return outpatient for the BAL and will be given instructions then on when to resume plavix. Since bacterial pneumonia was thought to be less likely, patient was discharged w/out antibiotics.    Plan is to start chemotherapy asap  Main complaint today is upper abd bloating, discomfort and belching despite use of pepcid.  present who is primary caregiver.        Card Dr. Julio CAD, [facility]    Pulm Dr. Christian following h/o aspiration pneumonia (admitted 3/23) and pulm nodule. Suspect COPD-Pfts ordered. S/p bronchoscopy 5/23 neg for cancer/dysplasia. AFB Cx x 3 AND RESP CX x2 NEG     GI Dr. Stephenson  colonoscopy 4/23 -2 polyps 1 tubular adenoma  EGD 3/23 -7 gastric polyps and small hiatal hernia, gastritis-path benign . H pylori neg     Pain mgmt Dr. Robles treating SI joint pain (s/p injection 1/23) and lumbar ddd and spondylosis s/p block 5/23.  C/o persistent pain  Right hip-can't sleep at night. Continuing to do PT. Taking gabapentin 100mg bid , mobic 15mg daily. Steps, walking and lying on that side the worst     History:     Had hospitalization 12/2021 after fall . She was admitted with Acute intratrochanteric comminuted fracture of the right femur and had surgery INSERTION, INTRAMEDULLARY LUC, FEMUR, TROCHANTER/RIGHT TFN DR FAJARDO NOTIFIED REP (Right)  by Dr. Fajardo  She suffered from postoperaive anemia and received one unit pRBC  Later pt was discharged to rehab  facility for rehab sessions      DEXA 2/2021 normal      Heme/onc Dr. Cassidy and gyn onc Dr. Ray -      Underwent right SO 9/20 which path revealed right ovarian endometrioid ca.  . completed chemo 2/2021  Right lung cancer s/p lobectomy diagnosed 2023 (see HPI)      Type 2 DM- a1c 5.3 urine micro neg, lipids at goal. On ACE I and zocor. Dks tage 3      Eye Dr. Diehl -[facility]    Podiatry Dr. Ding DM neuropathy 6/23     mammo 3/2022 neg     Vit d normal     Taking ortho for knee pain- Dr. Mojica started on mobic 3/10/20. ? CHERELLE     GI Dr. Stephenson treating GERD, h/o barretts diagnosed 2015 Dr. Jimenez.  treated with HALO and Stretta by Dr. Samuel.  Last egd 2018 benign.   Objective:      Physical Exam  Vitals and nursing note reviewed.   Constitutional:       Appearance: She is well-developed.   Cardiovascular:      Rate and Rhythm: Normal rate and regular rhythm.      Heart sounds: Normal heart sounds.   Pulmonary:      Effort: Pulmonary effort is normal.      Breath sounds: Normal breath sounds.   Skin:     General: Skin is warm and dry.   Neurological:      Mental Status: She is alert and oriented to person, place, and time.         Assessment:       1. Gastroesophageal reflux disease, unspecified whether esophagitis present    2. Insomnia, unspecified type    3. Hypertension associated with diabetes    4. Hyperlipidemia associated with type 2 diabetes mellitus    5. Coronary artery disease, unspecified vessel or lesion type, unspecified whether angina present, unspecified whether native or transplanted heart    6. S/P angioplasty with stent    7. Carcinoma of right ovary    8. Controlled type 2 diabetes mellitus with stage 3 chronic kidney disease, without long-term current use of insulin    9. Aspiration pneumonia of right lung, unspecified aspiration pneumonia type, unspecified part of lung    10. Malignant neoplasm of lower lobe of right lung    11. Status post lobectomy of lung        Plan:         1.  Gastroesophageal reflux disease, unspecified whether esophagitis present  D/c pepcid.  add  - omeprazole (PRILOSEC) 40 MG capsule; Take 1 capsule (40 mg total) by mouth once daily.  Dispense: 90 capsule; Refill: 3    2. Insomnia, unspecified type  Use prn  - LORazepam (ATIVAN) 1 MG tablet; Take 1 tablet (1 mg total) by mouth every 6 (six) hours as needed for Anxiety (insomnia).  Dispense: 30 tablet; Refill: 2    3. Hypertension associated with diabetes  Controlled on current medications.  Continue current medications.  Hold if BP < 100/60  - benazepriL (LOTENSIN) 40 MG tablet; 1/2 po qd  Dispense: 45 tablet; Refill: 3    4. Hyperlipidemia associated with type 2 diabetes mellitus  Hold statin until chemo completed    5. Coronary artery disease, unspecified vessel or lesion type, unspecified whether angina present, unspecified whether native or transplanted heart  continue  - clopidogreL (PLAVIX) 75 mg tablet; Take 1 tablet (75 mg total) by mouth once daily.    6. S/P angioplasty with stent  Cont plavix and asa. F/u card    7. Carcinoma of right ovary  Cont onc monitoring and care    8. Controlled type 2 diabetes mellitus with stage 3 chronic kidney disease, without long-term current use of insulin  Controlled with diet. Stop metformin  .  9. Aspiration pneumonia of right lung, unspecified aspiration pneumonia type, unspecified part of lung  resolved    10. Malignant neoplasm of lower lobe of right lung  Cont onc care and mgmt    11. Status post lobectomy of lung  Cont ct surgery post op care and pulm mgmt        Time spent with patient: 20 minutes    Patient with be reevaluated in 3 months or sooner prn    Greater than 50% of this visit was spent counseling as described in above documentation:Yes

## 2023-12-21 NOTE — ED PROVIDER NOTES
Encounter Date: 12/3/2023       History     Chief Complaint   Patient presents with    Fatigue     Pt arrived by ems from home, generalized weakness for 2 days. Recent stent placement      HPI  Review of patient's allergies indicates:  No Known Allergies  Past Medical History:   Diagnosis Date    Arthritis     Cardiac arrest 10/2023    Cataract     Colon polyp     Diabetes mellitus type II 2012    Encounter for blood transfusion     Extra-ovarian endometrioid adenocarcinoma 2020    GERD (gastroesophageal reflux disease)     History of chronic cough     clear productive    Hyperlipidemia     Hypertension     Macular degeneration     Ovarian cancer     PONV (postoperative nausea and vomiting)     Squamous cell carcinoma 1980's    precancer of cervix     Past Surgical History:   Procedure Laterality Date    AUGMENTATION OF BREAST      BRONCHOSCOPY N/A 12/12/2023    Procedure: BRONCHOSCOPY;  Surgeon: Neva Christian MD;  Location: [facility];  Service: ENT;  Laterality: N/A;    BRONCHOSCOPY WITH FLUOROSCOPY Right 05/11/2023    Procedure: BRONCHOSCOPY, WITH FLUOROSCOPY;  Surgeon: Neva Christian MD;  Location: [facility];  Service: Pulmonary;  Laterality: Right;    BRONCHOSCOPY WITH FLUOROSCOPY N/A 12/15/2023    Procedure: BRONCHOSCOPY, WITH FLUOROSCOPY;  Surgeon: Neva Christian MD;  Location: [facility];  Service: Pulmonary;  Laterality: N/A;    CATARACT EXTRACTION BILATERAL W/ ANTERIOR VITRECTOMY Bilateral     CHOLECYSTECTOMY      COLONOSCOPY      COLONOSCOPY N/A 04/20/2023    Procedure: COLONOSCOPY;  Surgeon: Sabino Stephenson MD;  Location: [facility];  Service: Endoscopy;  Laterality: N/A;    CORONARY STENT PLACEMENT  10/2023    x 3    ESOPHAGOGASTRODUODENOSCOPY N/A 06/20/2018    Procedure: EGD (ESOPHAGOGASTRODUODENOSCOPY);  Surgeon: Sabino Stephenson MD;  Location: [facility];  Service: Endoscopy;  Laterality: N/A;    ESOPHAGOGASTRODUODENOSCOPY N/A 03/31/2023    Procedure: EGD (ESOPHAGOGASTRODUODENOSCOPY);   Surgeon: Sabino Stephenson MD;  Location: [facility] ENDO;  Service: Endoscopy;  Laterality: N/A;    ESOPHAGOGASTRODUODENOSCOPY N/A 12/11/2023    Procedure: EGD (ESOPHAGOGASTRODUODENOSCOPY);  Surgeon: Pretty Salgado MD;  Location: [facility] ENDO;  Service: Endoscopy;  Laterality: N/A;    HYSTERECTOMY  1976    partial    INJECTION OF ANESTHETIC AGENT AROUND MEDIAL BRANCH NERVES INNERVATING LUMBAR FACET JOINT Right 05/10/2023    Procedure: Block-nerve-Lateral-branch-lumbar;  Surgeon: Agustin Robles MD;  Location: [facility];  Service: Pain Management;  Laterality: Right;  L5 and s1,s2 LBB    INJECTION OF ANESTHETIC AGENT AROUND MEDIAL BRANCH NERVES INNERVATING LUMBAR FACET JOINT Right 05/30/2023    Procedure: Block-nerve-medial branch-lumbar;  Surgeon: Agustin Robles MD;  Location: [facility];  Service: Pain Management;  Laterality: Right;  L5, s1 ,s2 LBB #2    INJECTION OF ANESTHETIC AGENT AROUND NERVE Right 10/31/2023    Procedure: INTERCOSTAL NERVE BLOCK;  Surgeon: Washington Shankar MD;  Location: [facility] OR;  Service: Cardiothoracic;  Laterality: Right;    INJECTION, SACROILIAC JOINT Right 01/26/2023    Procedure: INJECTION,SACROILIAC JOINT;  Surgeon: Agustin Robles MD;  Location: [facility];  Service: Pain Management;  Laterality: Right;    INSERTION OF TUNNELED CENTRAL VENOUS CATHETER (CVC) WITH SUBCUTANEOUS PORT Right 10/19/2020    Procedure: INSERTION, PORT-A-CATH;  Surgeon: Misti Mcfarland MD;  Location: [facility];  Service: General;  Laterality: Right;    INTRAMEDULLARY RODDING OF TROCHANTER OF FEMUR Right 11/28/2021    Procedure: INSERTION, INTRAMEDULLARY LUC, FEMUR, TROCHANTER/RIGHT TFN DR FAJARDO NOTIFIED REP;  Surgeon: Kp Fajardo MD;  Location: [facility] OR;  Service: Orthopedics;  Laterality: Right;  SKIP    ROBOT-ASSISTED LAPAROSCOPIC LYMPHADENECTOMY USING DA NELLA XI N/A 09/21/2020    Procedure: XI ROBOTIC LYMPHADENECTOMY-pelvic and kell-aortic;  Surgeon: Altagracia Ray MD;  Location: [facility] OR;  Service: OB/GYN;   Laterality: N/A;    ROBOT-ASSISTED LAPAROSCOPIC OMENTECTOMY USING DA NELLA XI N/A 2020    Procedure: XI ROBOTIC OMENTECTOMY;  Surgeon: Altagracia Ray MD;  [facility]: [email or web address] OR;  Service: OB/GYN;  Laterality: N/A;    ROBOT-ASSISTED LAPAROSCOPIC SALPINGO-OOPHORECTOMY USING DA NELLA XI Bilateral 2020    Procedure: XI ROBOTIC SALPINGO-OOPHORECTOMY;  Surgeon: Altagracia Ray MD;  Location: [email or web address] OR;  Service: OB/GYN;  Laterality: Bilateral;    THORACOSCOPIC BIOPSY OF PLEURA Right 10/31/2023    Procedure: VATS, WITH PLEURA BIOPSY;  Surgeon: Washington Shankar MD;  Location: [facility] OR;  Service: Cardiothoracic;  Laterality: Right;  FROZEN SECTION   **KRISTEN-ASSISDT**    THORACOTOMY Right 10/31/2023    Procedure: THORACOTOMY;  Surgeon: Washington Shankar MD;  Location: [facility] OR;  Service: Cardiothoracic;  Laterality: Right;    UPPER GASTROINTESTINAL ENDOSCOPY       Family History   Problem Relation Age of Onset    Cancer Mother 57        lung    Cancer Father 56        oral    Skin cancer Sister     Skin cancer Brother     Melanoma Brother     Skin cancer Brother     Breast cancer Maternal Grandmother     Breast cancer Paternal Grandmother     Colon polyps Daughter     Psoriasis Neg Hx     Lupus Neg Hx     Eczema Neg Hx     Colon cancer Neg Hx     Crohn's disease Neg Hx     Esophageal cancer Neg Hx     Stomach cancer Neg Hx     Ulcerative colitis Neg Hx      Social History     Tobacco Use    Smoking status: Former     Current packs/day: 0.00     Average packs/day: 1 pack/day for 20.0 years (20.0 ttl pk-yrs)     Types: Cigarettes     Start date:      Quit date: 1981     Years since quittin.9    Smokeless tobacco: Never    Tobacco comments:     quit 40 yrs ago   Substance Use Topics    Alcohol use: Yes     Alcohol/week: 1.0 standard drink of alcohol     Types: 1 Glasses of wine per week     Comment: occasional    Drug use: No     Review of Systems    Physical Exam     Initial Vitals [23 1834]   BP  Pulse Resp Temp SpO2   (!) 151/73 (!) 119 16 (!) 100.5 °F (38.1 °C) (!) 89 %      MAP       --         Physical Exam    ED Course   Procedures  Labs Reviewed   B-TYPE NATRIURETIC PEPTIDE - Abnormal; Notable for the following components:       Result Value     (*)     All other components within normal limits   COMPREHENSIVE METABOLIC PANEL - Abnormal; Notable for the following components:    CO2 22 (*)     Glucose 123 (*)     BUN 24 (*)     Albumin 3.4 (*)     Alkaline Phosphatase 45 (*)     eGFR 56.2 (*)     All other components within normal limits   MAGNESIUM - Abnormal; Notable for the following components:    Magnesium 1.3 (*)     All other components within normal limits   PROTIME-INR - Abnormal; Notable for the following components:    Prothrombin Time 14.3 (*)     INR 1.3 (*)     All other components within normal limits   TROPONIN I HIGH SENSITIVITY - Abnormal; Notable for the following components:    Troponin I High Sensitivity 16.6 (*)     All other components within normal limits   LIPASE   TSH   LACTIC ACID, PLASMA   SARS-COV-2 RNA AMPLIFICATION, QUAL   INFLUENZA A AND B ANTIGEN    Narrative:     Specimen Source->Nasopharyngeal Swab        ECG Results              EKG 12-lead (Final result)  Result time 12/14/23 22:29:31      Final result by Interface, Lab In [facility] (12/14/23 22:29:31)                   Narrative:    Test Reason : R00.0,    Vent. Rate : 112 BPM     Atrial Rate : 112 BPM     P-R Int : 114 ms          QRS Dur : 058 ms      QT Int : 346 ms       P-R-T Axes : 031 015 053 degrees     QTc Int : 472 ms    Sinus tachycardia  Junctional ST depression, probably normal  Borderline Abnormal ECG  When compared with ECG of 03-NOV-2023 09:58,  Sinus rhythm has replaced Atrial fibrillation  Criteria for Anterior infarct are no longer Present  ST now depressed in Anterior leads  Nonspecific T wave abnormality, improved in Lateral leads  Confirmed by Narciso Hoff MD ([id number]) on 12/14/2023  10:29:19 PM    Referred By: [email or web address]   SELF           Confirmed By:Narciso Hoff MD                                  Imaging Results              X-Ray Chest AP Portable (Final result)  Result time 12/03/23 19:33:18      Final result by Marcella Kwon Jr., MD (12/03/23 19:33:18)                   Narrative:    EXAMINATION:  XR CHEST AP PORTABLE. 1 view    CLINICAL INDICATION:  Female, 83 years old. Follow-up pneumothorax    COMPARISON:  November 15, 2023    FINDINGS:  The heart is of normal size. Great vessel silhouette is unremarkable.    Left lung remains clear. Diffuse infiltrative changes in the right lung and right pleural effusion persist. The pleural drainage catheter projects over the right upper chest. The small right apical pneumothorax is unchanged.  Calcified breast implants again noted    IMPRESSION: No change since November 15, 2023. Small right apical pneumothorax persists. Diffuse infiltration in the right lung and right pleural effusion persist.    Electronically signed by:  Marcella Kwon MD  12/03/2023 07:33 PM CST Workstation: [id number]                                     Medications   cefepime 2 g in dextrose 5% 100 mL IVPB (ready to mix) (0 g Intravenous Stopped 12/3/23 1933)   lactated ringers bolus 1,000 mL (0 mLs Intravenous Stopped 12/3/23 2148)   vancomycin 1.25 g in dextrose 5% 250 mL IVPB (ready to mix) (0 mg Intravenous Stopped 12/3/23 2148)   sodium chloride 0.9% bolus 500 mL 500 mL (0 mLs Intravenous Stopped 12/4/23 0242)   iohexoL (OMNIPAQUE 350) injection 100 mL (100 mLs Intravenous Given 12/4/23 1233)   magnesium sulfate 2g in water 50mL IVPB (premix) (0 g Intravenous Stopped 12/4/23 1706)   heparin, porcine (PF) 100 unit/mL injection flush 500 Units (500 Units Intravenous Given 12/5/23 0945)   heparin, porcine (PF) 100 unit/mL injection flush 500 Units (500 Units Intravenous Given 12/5/23 0945)     Medical Decision Making  Amount and/or Complexity of Data  Reviewed  Labs: ordered.  Radiology: ordered.              Attending Attestation:         Attending Critical Care:   Critical Care Times:   Direct Patient Care (initial evaluation, reassessments, and time considering the case)................................................................10 minutes.   Additional History from reviewing old medical records or taking additional history from the family, EMS, PCP, etc.......................5 minutes.   Ordering, Reviewing, and Interpreting Diagnostic Studies...............................................................................................................10 minutes.   Documentation..................................................................................................................................................................................5 minutes.   Consultation with other Physicians. .................................................................................................................................................5 minutes.   ==============================================================  Total Critical Care Time - exclusive of procedural time: 35 minutes.  ==============================================================  Critical care was necessary to treat or prevent imminent or life-threatening deterioration of the following conditions: sepsis.   Critical care was time spent personally by me on the following activities: examination of patient, ordering lab, x-rays, and/or EKG, evaluation of patient's response to treatment and discussion with consultants.   Critical Care Condition: critical                                  Clinical Impression:  Final diagnoses:  [R00.0] Tachycardia  [R50.9] Fever  [A41.9] Sepsis, due to unspecified organism, unspecified whether acute organ dysfunction present (Primary)          ED Disposition Condition    Observation                 Peters, Martha ENCINAS MD  12/21/23 0159

## 2023-12-22 ENCOUNTER — OUTPATIENT CASE MANAGEMENT (OUTPATIENT)
Dept: ADMINISTRATIVE | Facility: OTHER | Age: 83
End: 2023-12-22
Payer: MEDICARE

## 2023-12-22 ENCOUNTER — OFFICE VISIT (OUTPATIENT)
Dept: HEMATOLOGY/ONCOLOGY | Facility: CLINIC | Age: 83
End: 2023-12-22
Payer: MEDICARE

## 2023-12-22 VITALS
DIASTOLIC BLOOD PRESSURE: 73 MMHG | RESPIRATION RATE: 16 BRPM | BODY MASS INDEX: 20.67 KG/M2 | WEIGHT: 116.69 LBS | SYSTOLIC BLOOD PRESSURE: 166 MMHG | HEART RATE: 92 BPM | TEMPERATURE: 98 F

## 2023-12-22 DIAGNOSIS — C34.31 MALIGNANT NEOPLASM OF LOWER LOBE OF RIGHT LUNG: Primary | ICD-10-CM

## 2023-12-22 PROCEDURE — 99214 PR OFFICE/OUTPT VISIT, EST, LEVL IV, 30-39 MIN: ICD-10-PCS | Mod: S$GLB,,, | Performed by: INTERNAL MEDICINE

## 2023-12-22 PROCEDURE — 1157F ADVNC CARE PLAN IN RCRD: CPT | Mod: CPTII,S$GLB,, | Performed by: INTERNAL MEDICINE

## 2023-12-22 PROCEDURE — 3288F PR FALLS RISK ASSESSMENT DOCUMENTED: ICD-10-PCS | Mod: CPTII,S$GLB,, | Performed by: INTERNAL MEDICINE

## 2023-12-22 PROCEDURE — 3078F DIAST BP <80 MM HG: CPT | Mod: CPTII,S$GLB,, | Performed by: INTERNAL MEDICINE

## 2023-12-22 PROCEDURE — 1159F MED LIST DOCD IN RCRD: CPT | Mod: CPTII,S$GLB,, | Performed by: INTERNAL MEDICINE

## 2023-12-22 PROCEDURE — 3288F FALL RISK ASSESSMENT DOCD: CPT | Mod: CPTII,S$GLB,, | Performed by: INTERNAL MEDICINE

## 2023-12-22 PROCEDURE — 1125F AMNT PAIN NOTED PAIN PRSNT: CPT | Mod: CPTII,S$GLB,, | Performed by: INTERNAL MEDICINE

## 2023-12-22 PROCEDURE — 1125F PR PAIN SEVERITY QUANTIFIED, PAIN PRESENT: ICD-10-PCS | Mod: CPTII,S$GLB,, | Performed by: INTERNAL MEDICINE

## 2023-12-22 PROCEDURE — 1159F PR MEDICATION LIST DOCUMENTED IN MEDICAL RECORD: ICD-10-PCS | Mod: CPTII,S$GLB,, | Performed by: INTERNAL MEDICINE

## 2023-12-22 PROCEDURE — 1111F PR DISCHARGE MEDS RECONCILED W/ CURRENT OUTPATIENT MED LIST: ICD-10-PCS | Mod: CPTII,S$GLB,, | Performed by: INTERNAL MEDICINE

## 2023-12-22 PROCEDURE — 1111F DSCHRG MED/CURRENT MED MERGE: CPT | Mod: CPTII,S$GLB,, | Performed by: INTERNAL MEDICINE

## 2023-12-22 PROCEDURE — 3077F PR MOST RECENT SYSTOLIC BLOOD PRESSURE >= 140 MM HG: ICD-10-PCS | Mod: CPTII,S$GLB,, | Performed by: INTERNAL MEDICINE

## 2023-12-22 PROCEDURE — 3078F PR MOST RECENT DIASTOLIC BLOOD PRESSURE < 80 MM HG: ICD-10-PCS | Mod: CPTII,S$GLB,, | Performed by: INTERNAL MEDICINE

## 2023-12-22 PROCEDURE — 1101F PR PT FALLS ASSESS DOC 0-1 FALLS W/OUT INJ PAST YR: ICD-10-PCS | Mod: CPTII,S$GLB,, | Performed by: INTERNAL MEDICINE

## 2023-12-22 PROCEDURE — 99214 OFFICE O/P EST MOD 30 MIN: CPT | Mod: S$GLB,,, | Performed by: INTERNAL MEDICINE

## 2023-12-22 PROCEDURE — 3077F SYST BP >= 140 MM HG: CPT | Mod: CPTII,S$GLB,, | Performed by: INTERNAL MEDICINE

## 2023-12-22 PROCEDURE — 1101F PT FALLS ASSESS-DOCD LE1/YR: CPT | Mod: CPTII,S$GLB,, | Performed by: INTERNAL MEDICINE

## 2023-12-22 PROCEDURE — 1157F PR ADVANCE CARE PLAN OR EQUIV PRESENT IN MEDICAL RECORD: ICD-10-PCS | Mod: CPTII,S$GLB,, | Performed by: INTERNAL MEDICINE

## 2023-12-22 NOTE — LETTER
Alexa Otero  [address] [place] [id number]      Dear Alexa Otero,     I work with [facility] Outpatient Care Management Department. We received a referral to call you to discuss your medical history. These services are free of charge and are offered to [facility] patients who have recently been discharged from any of our facilities or who have medical conditions that may require the skill of a nurse to assist with management.     I am a Registered Nurse who specializes in connecting patients with available medical and financial resources as well as addressing any educational needs that may be indicated.    I attempted to reach you by telephone, but I was unsuccessful. Please call our department so that we can go over some questions with you, regarding your health.    The Outpatient Care Management Department can be reached at [phone], from 8:00AM to 4:30 PM, on Monday thru Friday.     Additionally, [facility] also has a program where a nurse is available 24/7 to answer questions or provide medical advice, their number is [phone].      Thanks,        Hui Hayes RN  Outpatient Care Management  Phone #: [phone]

## 2023-12-22 NOTE — ASSESSMENT & PLAN NOTE
Patient appears to have disease now in the right middle lobe of the lung.   Bronchoscopy result is not definitive but I feel it is very suggestive and given this, I will look further for treatment options.      NGS is pending at this time and is expected on 1/1/2024.  I will review for targets and make a further decision on what would be the best treatment moving forward.  I will plan to have her back in early January to discuss and proceed from there.

## 2023-12-22 NOTE — PROGRESS NOTES
12/22/23 - [facility] RN left message with contact information along with referral received; missed enrollment call letter sent via portal.

## 2023-12-22 NOTE — PROGRESS NOTES
PROGRESS NOTE    Subjective:       Patient ID: Alexa Otero is a 83 y.o. female.    8/15/2023 PET:  IMPRESSION:  1. Multifocal right lower lobe and right pleural FDG hypermetabolism as described, with associated right lower lobe consolidation. These are nonspecific and could be of infectious or inflammatory etiology in the clinical setting of chronic pneumonia, and or metastatic disease. Correlation with previous bronchoscopy results is needed.  2. Multiple new non hypermetabolic pulmonary nodules in both lungs, which are generally below size resolution for PET, but suspicious for pulmonary metastases.  3. No additional findings of FDG avid metastatic disease.    10/31/2023 RLL Lobectomy:  LUNG SYNOPTIC REPORT   PROCEDURE:     Lobectomy.   SPECIMEN LATERALITY:    Right   TUMOR SITE:     Lower lobe.   TUMOR SIZE:     Cannot be determined, 4.5 cm area of cavitation but    tumor appears to involve most if not all of the resected lobe.   TUMOR FOCALITY:    Single tumor.   HISTOLOGIC TYPE:    Invasive mucinous adenocarcinoma with pneumonic    pattern.   VISCEROPLEURAL INVASION:   Present   LYMPHOVASCULAR INVASION:   Not identified.   SPREAD OF TUMOR THROUGH AIR SPACES:  Present   DIRECT INVASION OF ADJACENT STRUCTURES: No adjacent structures present.   MARGINS:     All margins are uninvolved by tumor, margins examined:         Bronchial, distance of invasion of tumor from closest         margin: cannot be determined.   TREATMENT EFFECT:    No known presurgical therapy.   ADDITIONAL PATHOLOGIC FINDINGS:  Organized pulmonary emboli, pulmonary    infarct.   REGIONAL LYMPH NODES:    NUMBER OF LYMPH NODES INVOLVED:  Zero, number of lymph nodes examined:     1, yue         structures examined: 10 (hilar)   PATHOLOGIC STAGE CLASSIFICATION    OF PRIMARY TUMOR:    pT3    REGIONAL LYMPH NODES:   pN0     Comment: The sections show invasive mucinous adenocarcinoma with     findings that suggest so-called pneumonic pattern. there is diffuse    involvement with tumor present in all the histologic sections, often    with a lepidic pattern suggesting spread through the air spaces. The    reported CT findings of a consolidated appearance correlates with the    histologic findings and the reported clinical presentation of a    year-long cough productive of purulent mucus. Mucus is also a common    presentation of this somewhat uncommon pulmonary malignancy. These    tumors are two complete stages pT3 when there appears to be involvement    of the entire lobe; this appears to be appropriate in this case. There    are also organizing/organized pulmonary emboli within the vasculature,    possibly indicating hypercoagulability of malignancy as can be seen    especially with mucinous tumors. The tissue sampled in block 2F is    likely an infarct related to this. The history of endometrioid    adenocarcinoma is noted, but the current tumor represents a primary    lung malignancy and is unrelated to the prior cancer.     10/13/2023-Echo:  Normal systolic function with EF 55-60%  Grade I diastolic dysfunction    12/10/2023-CT CAP:  Progression of the alveolar consolidation in the right mid and lower lung with moderate size right pleural effusion. There is progression of the airspace disease within the right upper lobe with patchy groundglass opacity left upper lobe and left lung base. Findings are compatible with multifocal.     12/15/2023-Bronchoscopy:  LUNG, RIGHT MIDDLE LOBE, BIOPSY:   - ATYPICAL ADENOMATOUS HYPERPLASIA.   - IN A BACKGROUND OF REACTIVE FIBROSIS AND FIBRIN.     Comment:   Given the patient's history, this lesion is concerning for well    differentiated lepidic type adenocarcinoma but is quantitatively    insufficient (approximately 1 mm) for a definite diagnosis.     Multiple additional leveled sections were examined.       Chief Complaint:  No chief complaint on file.  Lung  cancer follow up    History of Present Illness:   Alexa Otero is a 83 y.o. female who presents for follow up of lung cancer.        Family and Social history reviewed and is unchanged from 12/1/2023             Current Outpatient Medications:     albuterol (VENTOLIN HFA) 90 mcg/actuation inhaler, Inhale 2 puffs into the lungs every 4 (four) hours as needed for Wheezing or Shortness of Breath. Rescue, Disp: 6.7 g, Rfl: 1    amiodarone (PACERONE) 200 MG Tab, Take 1 tablet (200 mg total) by mouth 2 (two) times daily., Disp: 60 tablet, Rfl: 11    aspirin (ECOTRIN) 81 MG EC tablet, Take 81 mg by mouth once daily., Disp: , Rfl:     benazepriL (LOTENSIN) 40 MG tablet, 1/2 po qd, Disp: 45 tablet, Rfl: 3    clopidogreL (PLAVIX) 75 mg tablet, Take 1 tablet (75 mg total) by mouth once daily., Disp: , Rfl:     gabapentin (NEURONTIN) 100 MG capsule, TAKE 1 CAPSULE(100 MG) BY MOUTH TWICE DAILY (Patient taking differently: Take 100 mg by mouth 2 (two) times daily.), Disp: 180 capsule, Rfl: 3    guaiFENesin 100 mg/5 ml (ROBITUSSIN) 100 mg/5 mL syrup, Take 10 mLs (200 mg total) by mouth 3 (three) times daily as needed for Cough., Disp: 100 mL, Rfl: 0    HYDROcodone-acetaminophen (NORCO) 5-325 mg per tablet, Take 1 tablet by mouth every 6 (six) hours as needed for Pain., Disp: , Rfl:     LORazepam (ATIVAN) 1 MG tablet, Take 1 tablet (1 mg total) by mouth every 6 (six) hours as needed for Anxiety (insomnia)., Disp: 30 tablet, Rfl: 2    magnesium oxide (MAG-OX) 400 mg (241.3 mg magnesium) tablet, Take 400 mg by mouth once daily., Disp: , Rfl:     melatonin 5 mg Chew, Take 1 tablet by mouth nightly as needed (insomnia)., Disp: , Rfl:     metoprolol succinate (TOPROL-XL) 25 MG 24 hr tablet, Take 25 mg by mouth once daily., Disp: , Rfl:     omeprazole (PRILOSEC) 40 MG capsule, Take 1 capsule (40 mg total) by mouth once daily., Disp: 90 capsule, Rfl: 3    potassium chloride (K-TAB) 20 mEq, Take 1 tablet by mouth once daily., Disp: ,  Rfl:     VIT A/VIT C/VIT E/ZINC/COPPER (ICAPS AREDS ORAL), Take 1 capsule by mouth 2 (two) times a day. , Disp: , Rfl:         Objective:       Physical Examination:     BP (!) 166/73   Pulse 92   Temp 97.9 °F (36.6 °C)   Resp 16   Wt 52.9 kg (116 lb 11.2 oz)   BMI 20.67 kg/m²     Physical Exam  Constitutional:       Appearance: Normal appearance.   HENT:      Head: Normocephalic and atraumatic.   Eyes:      General: No scleral icterus.     Conjunctiva/sclera: Conjunctivae normal.   Cardiovascular:      Rate and Rhythm: Normal rate.   Pulmonary:      Effort: Pulmonary effort is normal.   Abdominal:      General: Abdomen is flat.   Neurological:      General: No focal deficit present.      Mental Status: She is alert and oriented to person, place, and time.   Psychiatric:         Mood and Affect: Mood normal.         Behavior: Behavior normal.         Thought Content: Thought content normal.         Judgment: Judgment normal.         Labs:   Recent Results (from the past 336 hour(s))   CBC auto differential    Collection Time: 12/13/23  6:41 AM   Result Value Ref Range    WBC 9.45 3.90 - 12.70 K/uL    Hemoglobin 9.5 (L) 12.0 - 16.0 g/dL    Hematocrit 31.3 (L) 37.0 - 48.5 %    Platelets 418 150 - 450 K/uL   CBC auto differential    Collection Time: 12/12/23  4:18 AM   Result Value Ref Range    WBC 7.14 3.90 - 12.70 K/uL    Hemoglobin 8.9 (L) 12.0 - 16.0 g/dL    Hematocrit 28.7 (L) 37.0 - 48.5 %    Platelets 442 150 - 450 K/uL   CBC auto differential    Collection Time: 12/11/23  4:13 AM   Result Value Ref Range    WBC 6.44 3.90 - 12.70 K/uL    Hemoglobin 8.8 (L) 12.0 - 16.0 g/dL    Hematocrit 27.9 (L) 37.0 - 48.5 %    Platelets 462 (H) 150 - 450 K/uL     CMP  Sodium   Date Value Ref Range Status   12/13/2023 141 136 - 145 mmol/L Final     Potassium   Date Value Ref Range Status   12/13/2023 4.0 3.5 - 5.1 mmol/L Final     Chloride   Date Value Ref Range Status   12/13/2023 107 95 - 110 mmol/L Final     CO2   Date  "Value Ref Range Status   12/13/2023 28 23 - 29 mmol/L Final     Glucose   Date Value Ref Range Status   12/13/2023 124 (H) 70 - 110 mg/dL Final     BUN   Date Value Ref Range Status   12/13/2023 16 8 - 23 mg/dL Final     Creatinine   Date Value Ref Range Status   12/13/2023 1.0 0.5 - 1.4 mg/dL Final     Calcium   Date Value Ref Range Status   12/13/2023 8.7 8.7 - 10.5 mg/dL Final     Total Protein   Date Value Ref Range Status   12/13/2023 5.9 (L) 6.0 - 8.4 g/dL Final     Albumin   Date Value Ref Range Status   12/13/2023 2.9 (L) 3.5 - 5.2 g/dL Final     Total Bilirubin   Date Value Ref Range Status   12/13/2023 0.2 0.1 - 1.0 mg/dL Final     Comment:     For infants and newborns, interpretation of results should be based  on gestational age, weight and in agreement with clinical  observations.    Premature Infant recommended reference ranges:  Up to 24 hours.............<8.0 mg/dL  Up to 48 hours............<12.0 mg/dL  3-5 days..................<15.0 mg/dL  6-29 days.................<15.0 mg/dL       Alkaline Phosphatase   Date Value Ref Range Status   12/13/2023 40 (L) 55 - 135 U/L Final     AST   Date Value Ref Range Status   12/13/2023 10 10 - 40 U/L Final     ALT   Date Value Ref Range Status   12/13/2023 13 10 - 44 U/L Final     Anion Gap   Date Value Ref Range Status   12/13/2023 6 (L) 8 - 16 mmol/L Final     eGFR if    Date Value Ref Range Status   04/28/2022 40.6 (A) >60 mL/min/1.73 m^2 Final     eGFR if non    Date Value Ref Range Status   04/28/2022 35.3 (A) >60 mL/min/1.73 m^2 Final     Comment:     Calculation used to obtain the estimated glomerular filtration  rate (eGFR) is the CKD-EPI equation.        Lab Results   Component Value Date    CEA 1.3 07/23/2020     No results found for: "PSA"        Assessment/Plan:     Problem List Items Addressed This Visit       Malignant neoplasm of lower lobe of right lung - Primary     Patient appears to have disease now in the right " middle lobe of the lung.   Bronchoscopy result is not definitive but I feel it is very suggestive and given this, I will look further for treatment options.      NGS is pending at this time and is expected on 1/1/2024.  I will review for targets and make a further decision on what would be the best treatment moving forward.  I will plan to have her back in early January to discuss and proceed from there.              Discussion:     Follow up in about 3 weeks (around 1/12/2024).      Electronically signed by Virgil Lloyd

## 2023-12-24 NOTE — TELEPHONE ENCOUNTER
No care due was identified.  Health [facility] Embedded Care Due Messages. Reference number: [id number].   12/24/2023 5:57:40 AM CST

## 2023-12-26 ENCOUNTER — INFUSION (OUTPATIENT)
Dept: INFUSION THERAPY | Facility: HOSPITAL | Age: 83
End: 2023-12-26
Attending: OBSTETRICS & GYNECOLOGY
Payer: MEDICARE

## 2023-12-26 VITALS
WEIGHT: 116.38 LBS | DIASTOLIC BLOOD PRESSURE: 76 MMHG | SYSTOLIC BLOOD PRESSURE: 156 MMHG | HEART RATE: 94 BPM | BODY MASS INDEX: 20.62 KG/M2 | TEMPERATURE: 97 F | OXYGEN SATURATION: 96 % | HEIGHT: 63 IN | RESPIRATION RATE: 18 BRPM

## 2023-12-26 DIAGNOSIS — C56.1 CARCINOMA OF RIGHT OVARY: Primary | ICD-10-CM

## 2023-12-26 DIAGNOSIS — Z51.11 MAINTENANCE CHEMOTHERAPY: ICD-10-CM

## 2023-12-26 PROCEDURE — A4216 STERILE WATER/SALINE, 10 ML: HCPCS | Performed by: OBSTETRICS & GYNECOLOGY

## 2023-12-26 PROCEDURE — 25000003 PHARM REV CODE 250: Performed by: OBSTETRICS & GYNECOLOGY

## 2023-12-26 PROCEDURE — 63600175 PHARM REV CODE 636 W HCPCS: Performed by: OBSTETRICS & GYNECOLOGY

## 2023-12-26 PROCEDURE — 96523 IRRIG DRUG DELIVERY DEVICE: CPT

## 2023-12-26 RX ORDER — SODIUM CHLORIDE 0.9 % (FLUSH) 0.9 %
10 SYRINGE (ML) INJECTION
Status: CANCELLED
Start: 2023-12-26

## 2023-12-26 RX ORDER — HEPARIN 100 UNIT/ML
500 SYRINGE INTRAVENOUS
Status: COMPLETED | OUTPATIENT
Start: 2023-12-26 | End: 2023-12-26

## 2023-12-26 RX ORDER — HEPARIN 100 UNIT/ML
500 SYRINGE INTRAVENOUS
Status: CANCELLED
Start: 2023-12-26

## 2023-12-26 RX ORDER — METFORMIN HYDROCHLORIDE 500 MG/1
TABLET ORAL
Qty: 180 TABLET | Refills: 0 | OUTPATIENT
Start: 2023-12-26

## 2023-12-26 RX ORDER — SODIUM CHLORIDE 0.9 % (FLUSH) 0.9 %
10 SYRINGE (ML) INJECTION
Status: DISCONTINUED | OUTPATIENT
Start: 2023-12-26 | End: 2023-12-26 | Stop reason: HOSPADM

## 2023-12-26 RX ADMIN — HEPARIN 500 UNITS: 100 SYRINGE at 01:12

## 2023-12-26 RX ADMIN — SODIUM CHLORIDE, PRESERVATIVE FREE 10 ML: 5 INJECTION INTRAVENOUS at 01:12

## 2023-12-26 NOTE — TELEPHONE ENCOUNTER
Refill Decision Note   Alexamu Otero  is requesting a refill authorization.  Brief Assessment and Rationale for Refill:  Quick Discontinue     Medication Therapy Plan:  Med d/c on 12/20/23 by PCP; [facility]      Comments:     Note composed:6:26 AM 12/26/2023

## 2023-12-27 ENCOUNTER — OFFICE VISIT (OUTPATIENT)
Dept: PULMONOLOGY | Facility: CLINIC | Age: 83
End: 2023-12-27
Payer: MEDICARE

## 2023-12-27 VITALS
SYSTOLIC BLOOD PRESSURE: 142 MMHG | OXYGEN SATURATION: 93 % | BODY MASS INDEX: 20.51 KG/M2 | DIASTOLIC BLOOD PRESSURE: 74 MMHG | HEART RATE: 83 BPM | WEIGHT: 115.81 LBS

## 2023-12-27 DIAGNOSIS — R06.02 SOB (SHORTNESS OF BREATH): Primary | ICD-10-CM

## 2023-12-27 DIAGNOSIS — C34.90 ADENOCARCINOMA OF LUNG, UNSPECIFIED LATERALITY: ICD-10-CM

## 2023-12-27 PROCEDURE — 3078F DIAST BP <80 MM HG: CPT | Mod: CPTII,S$GLB,, | Performed by: INTERNAL MEDICINE

## 2023-12-27 PROCEDURE — 1157F ADVNC CARE PLAN IN RCRD: CPT | Mod: CPTII,S$GLB,, | Performed by: INTERNAL MEDICINE

## 2023-12-27 PROCEDURE — 3077F PR MOST RECENT SYSTOLIC BLOOD PRESSURE >= 140 MM HG: ICD-10-PCS | Mod: CPTII,S$GLB,, | Performed by: INTERNAL MEDICINE

## 2023-12-27 PROCEDURE — 3078F PR MOST RECENT DIASTOLIC BLOOD PRESSURE < 80 MM HG: ICD-10-PCS | Mod: CPTII,S$GLB,, | Performed by: INTERNAL MEDICINE

## 2023-12-27 PROCEDURE — 3077F SYST BP >= 140 MM HG: CPT | Mod: CPTII,S$GLB,, | Performed by: INTERNAL MEDICINE

## 2023-12-27 PROCEDURE — 1157F PR ADVANCE CARE PLAN OR EQUIV PRESENT IN MEDICAL RECORD: ICD-10-PCS | Mod: CPTII,S$GLB,, | Performed by: INTERNAL MEDICINE

## 2023-12-27 PROCEDURE — 1159F PR MEDICATION LIST DOCUMENTED IN MEDICAL RECORD: ICD-10-PCS | Mod: CPTII,S$GLB,, | Performed by: INTERNAL MEDICINE

## 2023-12-27 PROCEDURE — 1111F DSCHRG MED/CURRENT MED MERGE: CPT | Mod: CPTII,S$GLB,, | Performed by: INTERNAL MEDICINE

## 2023-12-27 PROCEDURE — 1111F PR DISCHARGE MEDS RECONCILED W/ CURRENT OUTPATIENT MED LIST: ICD-10-PCS | Mod: CPTII,S$GLB,, | Performed by: INTERNAL MEDICINE

## 2023-12-27 PROCEDURE — 1126F PR PAIN SEVERITY QUANTIFIED, NO PAIN PRESENT: ICD-10-PCS | Mod: CPTII,S$GLB,, | Performed by: INTERNAL MEDICINE

## 2023-12-27 PROCEDURE — 99214 OFFICE O/P EST MOD 30 MIN: CPT | Mod: S$GLB,,, | Performed by: INTERNAL MEDICINE

## 2023-12-27 PROCEDURE — 1126F AMNT PAIN NOTED NONE PRSNT: CPT | Mod: CPTII,S$GLB,, | Performed by: INTERNAL MEDICINE

## 2023-12-27 PROCEDURE — 99214 PR OFFICE/OUTPT VISIT, EST, LEVL IV, 30-39 MIN: ICD-10-PCS | Mod: S$GLB,,, | Performed by: INTERNAL MEDICINE

## 2023-12-27 PROCEDURE — 1159F MED LIST DOCD IN RCRD: CPT | Mod: CPTII,S$GLB,, | Performed by: INTERNAL MEDICINE

## 2023-12-27 NOTE — PROGRESS NOTES
SUBJECTIVE:    Patient ID: Alexa Otero is a 83 y.o. female.    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  The patient is awaiting decisions from Dr. Cassidy as to what regimen she will be offered.  She states she did well with chemotherapy before and is not afraid to try again.  She states she feels awful.  She continues to expectorate large volumes of clear mucus.  Past Medical History:   Diagnosis Date    Arthritis     Cardiac arrest 10/2023    Cataract     Colon polyp     Diabetes mellitus type II 2012    Encounter for blood transfusion     Extra-ovarian endometrioid adenocarcinoma 2020    GERD (gastroesophageal reflux disease)     History of chronic cough     clear productive    Hyperlipidemia     Hypertension     Macular degeneration     Ovarian cancer     PONV (postoperative nausea and vomiting)     Squamous cell carcinoma 1980's    precancer of cervix     Past Surgical History:   Procedure Laterality Date    AUGMENTATION OF BREAST      BRONCHOSCOPY N/A 12/12/2023    Procedure: BRONCHOSCOPY;  Surgeon: Neva Christian MD;  Location: [facility];  Service: ENT;  Laterality: N/A;    BRONCHOSCOPY WITH FLUOROSCOPY Right 05/11/2023    Procedure: BRONCHOSCOPY, WITH FLUOROSCOPY;  Surgeon: Neva Christian MD;  Location: [facility];  Service: Pulmonary;  Laterality: Right;    BRONCHOSCOPY WITH FLUOROSCOPY N/A 12/15/2023    Procedure: BRONCHOSCOPY, WITH FLUOROSCOPY;  Surgeon: Neva Christian MD;  Location: [facility];  Service: Pulmonary;  Laterality: N/A;    CATARACT EXTRACTION BILATERAL W/ ANTERIOR VITRECTOMY Bilateral     CHOLECYSTECTOMY      COLONOSCOPY      COLONOSCOPY N/A 04/20/2023    Procedure: COLONOSCOPY;  Surgeon: Sabino Stephenson MD;  Location: [facility];  Service: Endoscopy;  Laterality: N/A;     CORONARY STENT PLACEMENT  10/2023    x 3    ESOPHAGOGASTRODUODENOSCOPY N/A 06/20/2018    Procedure: EGD (ESOPHAGOGASTRODUODENOSCOPY);  Surgeon: Sabino Stephenson MD;  Location: [facility] ENDO;  Service: Endoscopy;  Laterality: N/A;    ESOPHAGOGASTRODUODENOSCOPY N/A 03/31/2023    Procedure: EGD (ESOPHAGOGASTRODUODENOSCOPY);  Surgeon: Sabino Stephenson MD;  Location: [facility] ENDO;  Service: Endoscopy;  Laterality: N/A;    ESOPHAGOGASTRODUODENOSCOPY N/A 12/11/2023    Procedure: EGD (ESOPHAGOGASTRODUODENOSCOPY);  Surgeon: Pretty Salgado MD;  Location: [facility];  Service: Endoscopy;  Laterality: N/A;    HYSTERECTOMY  1976    partial    INJECTION OF ANESTHETIC AGENT AROUND MEDIAL BRANCH NERVES INNERVATING LUMBAR FACET JOINT Right 05/10/2023    Procedure: Block-nerve-Lateral-branch-lumbar;  Surgeon: Agustin Robles MD;  Location: [facility];  Service: Pain Management;  Laterality: Right;  L5 and s1,s2 LBB    INJECTION OF ANESTHETIC AGENT AROUND MEDIAL BRANCH NERVES INNERVATING LUMBAR FACET JOINT Right 05/30/2023    Procedure: Block-nerve-medial branch-lumbar;  Surgeon: Agustin Robles MD;  Location: [facility];  Service: Pain Management;  Laterality: Right;  L5, s1 ,s2 LBB #2    INJECTION OF ANESTHETIC AGENT AROUND NERVE Right 10/31/2023    Procedure: INTERCOSTAL NERVE BLOCK;  Surgeon: Washington Shankar MD;  Location: [facility];  Service: Cardiothoracic;  Laterality: Right;    INJECTION, SACROILIAC JOINT Right 01/26/2023    Procedure: INJECTION,SACROILIAC JOINT;  Surgeon: Agustin Robles MD;  Location: [facility] OR;  Service: Pain Management;  Laterality: Right;    INSERTION OF TUNNELED CENTRAL VENOUS CATHETER (CVC) WITH SUBCUTANEOUS PORT Right 10/19/2020    Procedure: INSERTION, PORT-A-CATH;  Surgeon: Misti Mcfarland MD;  Location: [facility] OR;  Service: General;  Laterality: Right;    INTRAMEDULLARY RODDING OF TROCHANTER OF FEMUR Right 11/28/2021    Procedure: INSERTION, INTRAMEDULLARY LUC, FEMUR, TROCHANTER/RIGHT TFN DR FAJARDO NOTIFIED REP;   Surgeon: Kp Gutierres MD;  Location: [facility];  Service: Orthopedics;  Laterality: Right;  SKIP    ROBOT-ASSISTED LAPAROSCOPIC LYMPHADENECTOMY USING DA NELLA XI N/A 09/21/2020    Procedure: XI ROBOTIC LYMPHADENECTOMY-pelvic and kell-aortic;  Surgeon: Altagracia Ray MD;  Location: [facility] OR;  Service: OB/GYN;  Laterality: N/A;    ROBOT-ASSISTED LAPAROSCOPIC OMENTECTOMY USING DA NELLA XI N/A 09/21/2020    Procedure: XI ROBOTIC OMENTECTOMY;  Surgeon: Altagracia Ray MD;  Location: [facility] OR;  Service: OB/GYN;  Laterality: N/A;    ROBOT-ASSISTED LAPAROSCOPIC SALPINGO-OOPHORECTOMY USING DA NELLA XI Bilateral 09/21/2020    Procedure: XI ROBOTIC SALPINGO-OOPHORECTOMY;  Surgeon: Altagracia Ray MD;  Location: [facility] OR;  Service: OB/GYN;  Laterality: Bilateral;    THORACOSCOPIC BIOPSY OF PLEURA Right 10/31/2023    Procedure: VATS, WITH PLEURA BIOPSY;  Surgeon: Washington Shankar MD;  Location: [facility];  Service: Cardiothoracic;  Laterality: Right;  FROZEN SECTION   **KRISTEN-ASSISDT**    THORACOTOMY Right 10/31/2023    Procedure: THORACOTOMY;  Surgeon: Washington Shankar MD;  Location: [facility];  Service: Cardiothoracic;  Laterality: Right;    UPPER GASTROINTESTINAL ENDOSCOPY       Family History   Problem Relation Age of Onset    Cancer Mother 57        lung    Cancer Father 56        oral    Skin cancer Sister     Skin cancer Brother     Melanoma Brother     Skin cancer Brother     Breast cancer Maternal Grandmother     Breast cancer Paternal Grandmother     Colon polyps Daughter     Psoriasis Neg Hx     Lupus Neg Hx     Eczema Neg Hx     Colon cancer Neg Hx     Crohn's disease Neg Hx     Esophageal cancer Neg Hx     Stomach cancer Neg Hx     Ulcerative colitis Neg Hx         Social History:   Marital Status:   Occupation: Data Unavailable  Alcohol History:  reports current alcohol use of about 1.0 standard drink of alcohol per week.  Tobacco History:  reports that she quit smoking about 43 years ago.  Her smoking use included cigarettes. She started smoking about 63 years ago. She has a 20.0 pack-year smoking history. She has never used smokeless tobacco.  Drug History:  reports no history of drug use.    Review of patient's allergies indicates:  No Known Allergies    Current Outpatient Medications   Medication Sig Dispense Refill    amiodarone (PACERONE) 200 MG Tab Take 1 tablet (200 mg total) by mouth 2 (two) times daily. 60 tablet 11    aspirin (ECOTRIN) 81 MG EC tablet Take 81 mg by mouth once daily.      benazepriL (LOTENSIN) 40 MG tablet 1/2 po qd 45 tablet 3    clopidogreL (PLAVIX) 75 mg tablet Take 1 tablet (75 mg total) by mouth once daily.      gabapentin (NEURONTIN) 100 MG capsule TAKE 1 CAPSULE(100 MG) BY MOUTH TWICE DAILY (Patient taking differently: Take 100 mg by mouth 2 (two) times daily.) 180 capsule 3    magnesium oxide (MAG-OX) 400 mg (241.3 mg magnesium) tablet Take 400 mg by mouth once daily.      metoprolol succinate (TOPROL-XL) 25 MG 24 hr tablet Take 25 mg by mouth once daily.      omeprazole (PRILOSEC) 40 MG capsule Take 1 capsule (40 mg total) by mouth once daily. 90 capsule 3    potassium chloride (K-TAB) 20 mEq Take 1 tablet by mouth once daily.      VIT A/VIT C/VIT E/ZINC/COPPER (ICAPS AREDS ORAL) Take 1 capsule by mouth 2 (two) times a day.       albuterol (VENTOLIN HFA) 90 mcg/actuation inhaler Inhale 2 puffs into the lungs every 4 (four) hours as needed for Wheezing or Shortness of Breath. Rescue 6.7 g 1    HYDROcodone-acetaminophen (NORCO) 5-325 mg per tablet Take 1 tablet by mouth every 6 (six) hours as needed for Pain.      LORazepam (ATIVAN) 1 MG tablet Take 1 tablet (1 mg total) by mouth every 6 (six) hours as needed for Anxiety (insomnia). (Patient not taking: Reported on 12/27/2023) 30 tablet 2    melatonin 5 mg Chew Take 1 tablet by mouth nightly as needed (insomnia).       No current facility-administered medications for this visit.       Last PFT: 5/11/23 FEV1 1.72, no  obstruction, no restriction, moderate diffusion defect.    Last CT:3/18/23  IMPRESSION:  No CT evidence for pulmonary embolism.   Focal area of airspace opacity/consolidation involving the posterior segment right lower lobe no significantly changed when compared to prior study.   Emphysematous changes.   Prominent right hilar and subcarinal lymph nodes, may be reactive in nature.   Inferior deformity with minimal increase sclerosis at T11 perhaps suggesting old compression deformity.    PET/CT  8/15/23  IMPRESSION:  1. Multifocal right lower lobe and right pleural FDG hypermetabolism as described, with associated right lower lobe consolidation. These are nonspecific and could be of infectious or inflammatory etiology in the clinical setting of chronic pneumonia, and or metastatic disease. Correlation with previous bronchoscopy results is needed.  2. Multiple new non hypermetabolic pulmonary nodules in both lungs, which are generally below size resolution for PET, but suspicious for pulmonary metastases.  3. No additional findings of FDG avid metastatic disease.      Review of Systems  General: Feeling ok  Eyes: Vision is good. She has macular degeneration.  ENT:  No sinusitis or pharyngitis.   Heart:: No chest pain or palpitations.  Lungs: Coughs, produces clear mucus.  She isstill producing a great quantity of clear mucus through the day and night  GI:  Back to alternating diarrhea and constipation  : No dysuria, hesitancy, or nocturia.  Musculoskeletal: has R hip pain  Skin: No lesions or rashes.  Neuro: No headaches or neuropathy.  Lymph: No edema or adenopathy.  Psych: No anxiety or depression.  Endo:  She is down 9 lb since her last office visit.    OBJECTIVE:      BP (!) 142/74 (BP Location: Right arm, Patient Position: Sitting, BP Method: Medium (Manual))   Pulse 83   Wt 52.5 kg (115 lb 12.8 oz)   SpO2 (!) 93%   BMI 20.51 kg/m²     Physical Exam  GENERAL: Older patient in no distress.  HEENT: Pupils  equal and reactive. Extraocular movements intact. Nose intact.      Pharynx moist.  NECK: Supple.   HEART: Regular rate and rhythm. No murmur or gallop auscultated.  LUNGS:  There are decreased breath sounds in the right base.  Above this there crackles.  The left base also crackles.  Lung excursion symmetrical. No change in fremitus.  ABDOMEN: Bowel sounds present. Non-tender, no masses palpated.  EXTREMITIES: Normal muscle tone and joint movement, no cyanosis or clubbing.   LYMPHATICS: No adenopathy palpated, no edema.  SKIN: Dry, intact, no lesions.   NEURO: Cranial nerves II-XII intact. Motor strength 5/5 bilaterally, upper and lower extremities.  PSYCH: Appropriate affect.          Assessment:       1. SOB (shortness of breath)    2. Adenocarcinoma of lung, unspecified laterality            Plan:       SOB (shortness of breath)  -     Stress test, pulmonary; Future; Expected date: 12/27/2023    Adenocarcinoma of lung, unspecified laterality  Comments:  Mostly right middle lobe, some right upper lobe, some left upper lobe and left lower lobe              6 minute walk to assess the need for oxygen  The patient knows to call if she needs anything  Discussed hospice if chemotherapy and/or immunotherapy are not doable for her   Follow up in about 3 months (around 3/27/2024).

## 2023-12-28 ENCOUNTER — OUTPATIENT CASE MANAGEMENT (OUTPATIENT)
Dept: ADMINISTRATIVE | Facility: OTHER | Age: 83
End: 2023-12-28
Payer: MEDICARE

## 2023-12-28 NOTE — PROGRESS NOTES
Outpatient Care Management  Initial Patient Assessment    Patient: Alexa Otero  MRN: [id number]  Date of Service: 12/28/2023  Completed by: Hui Hayes RN  Referral Date: 12/12/2023  Date of Eligibility: 12/13/2023  Program: High Risk  Status: Ongoing  Effective Dates: 12/28/2023 - present  Responsible Staff: Hui Hayes, RN        Reason for Visit   Patient presents with    OPCM Enrollment Call    Nursing Assessment       Brief Summary:  Alexa Otero was referred by Dr. Lemon for cough. Patient qualifies for program based on high risk score 60.5%.   Active problem list, medical, surgical and social history reviewed. Active comorbidities include HTN, HLD, DM2, CAD s/p PCI, Right lobe adenocarcinoma s/p lobectomy. Areas of need identified by patient include shortness of breath, lack of oxygen.   Next steps:   Mrs. Otero agreed to OPCM RN follow up on or around 1/04/2024.  Contact [facility]'s pharmacy regarding potassium; called pharmacy on 12/29 and medication will be ready for pickup this afternoon; notified Mrs. Otero in regards to this matter.   PATIENT SELF MANAGEMENT PLAN:   Mrs. Otero agreed to adhere to medication regimen as ordered by next call.    Message Dr. Christian to check on status of approval for 6 minute walk  Message Hem/Onc to see if earlier appointment is available (after 1/1, when NSG will be resulted)  Education on oxygen safety via portal (prevention) and fatigue, review on follow up call.  PATIENT SELF MANAGEMENT PLAN:  Mrs. Otero agrees to utilize DME appropriately daily.    Disability Status  Is the patient alert and oriented (person, place, time, and situation)?: Alert and oriented x 4  Hearing Difficulty or Deaf: no  Visual Difficulty or Blind: yes  Visual and Hearing Needs Conclusion: history of macular degeneration;wears prescription glasses and uses OTC readers;visits Dr. Diehl every 6 months;no hearing issues  Difficulty Concentrating, Remembering or Making Decisions: yes  (trouble remembering things; feels it is due to being in the hospital for so long; example trouble recalling names or things)  Communication Difficulty: no  Eating/Swallowing Difficulty: no  Walking or Climbing Stairs Difficulty: no  Walking or Climbing Stairs: ambulation difficulty, requires equipment; stair climbing difficulty, assistance 1 person; transferring difficulty, requires equipment  Dressing/Bathing Difficulty: no  Dressing/Bathing: bathing difficulty, requires equipment  Dressing/Bathing Management: shower chair and bedside commode  Toileting : Independent  Continence : Incontience - Bladder  Difficulty Managing Errands Independently: yes  Equipment Currently Used at Home: bedside commode; blood pressure machine; shower chair; lift device; other (see comments); grab bar; rollator (lift that assists pt in and out of tub; pulse oximeter, grab bars in bathroom and bedroom)  ADL Conclusion Statement: Independent of ADL's; wears incontinence pads for urine leakage; uses grab bars in the bathroom and bedroom for safety  Change in Functional Status Since Onset of Current Illness/Injury: yes (Increased shortness of breath related to cancer diagnosis)        Spiritual Beliefs  Spiritual, Cultural Beliefs, Restorationist Practices, Values that Affect Care: no      Social History     Socioeconomic History    Marital status:    Tobacco Use    Smoking status: Former     Current packs/day: 0.00     Average packs/day: 1 pack/day for 20.0 years (20.0 ttl pk-yrs)     Types: Cigarettes     Start date:      Quit date: 1981     Years since quittin.0    Smokeless tobacco: Never    Tobacco comments:     quit 40 yrs ago   Substance and Sexual Activity    Alcohol use: Yes     Alcohol/week: 1.0 standard drink of alcohol     Types: 1 Glasses of wine per week     Comment: occasional    Drug use: No    Sexual activity: Not Currently     Partners: Male     Social Determinants of Health     Financial Resource Strain: Low  Risk  (11/27/2023)    Overall Financial Resource Strain (CARDIA)     Difficulty of Paying Living Expenses: Not very hard   Food Insecurity: Food Insecurity Present (11/27/2023)    Hunger Vital Sign     Worried About Running Out of Food in the Last Year: Sometimes true     Ran Out of Food in the Last Year: Never true   Transportation Needs: No Transportation Needs (11/27/2023)    PRAPARE - Transportation     Lack of Transportation (Medical): No     Lack of Transportation (Non-Medical): No   Physical Activity: Inactive (11/27/2023)    Exercise Vital Sign     Days of Exercise per Week: 0 days     Minutes of Exercise per Session: 0 min   Stress: Stress Concern Present (11/27/2023)    [employer] - Occupational Stress Questionnaire     Feeling of Stress : To some extent   Social Connections: Unknown (11/27/2023)    Social Connection and Isolation Panel [NHANES]     Frequency of Communication with Friends and Family: More than three times a week     Frequency of Social Gatherings with Friends and Family: Once a week     Active Member of Clubs or Organizations: No     Attends Club or Organization Meetings: Never     Marital Status:    Housing Stability: Low Risk  (11/27/2023)    Housing Stability Vital Sign     Unable to Pay for Housing in the Last Year: No     Number of Places Lived in the Last Year: 1     Unstable Housing in the Last Year: No       Roles and Relationships  Primary Source of Support/Comfort: spouse; child(michael)  Name of Support/Comfort Primary Source: Porter Otero spouse  Secondary Source of Support/Comfort: child(michael) (Joyce Christian, daughter)      Advance Directives (For Healthcare)  Advance Directive  (If Adv Dir status is received, view document under Adv Dir in header or Chart Review Media tab): Advance Directive currently in Epic.        Patient Reported Insurance  Verified current insurance plan:: [employer]vantage  [employer] benefits discussed:: Silver Sneakers;  Transportation; Well [employer]; OTC Prescription Discounts; Mail Order Pharmacy            12/27/2023     3:33 PM 12/22/2023    11:16 AM 12/1/2023     9:21 AM 5/16/2023    11:39 AM 4/13/2023     2:29 PM 2/23/2023    10:54 AM 1/6/2023     1:36 PM   Depression Patient Health Questionnaire   Over the last two weeks how often have you been bothered by little interest or pleasure in doing things Not at all Not at all Not at all Not at all Not at all Not at all Not at all   Over the last two weeks how often have you been bothered by feeling down, depressed or hopeless Not at all Not at all Not at all Not at all Not at all Not at all Not at all   PHQ-2 Total Score 0 0 0 0 0 0 0       Learning Assessment       12/29/2023 1111 [facility] (12/28/2023 - Present)   Created by Hui Hayes RN -  (Nurse) Status: Complete                 PRIMARY LEARNER     Primary Learner Name:  Alexa Otero DB - 12/29/2023 1111    Relationship:  Patient DB - 12/29/2023 1111    Does the primary learner have any barriers to learning?:  Visual DB - 12/29/2023 1111    What is the preferred language of the primary learner?:  English DB - 12/29/2023 1111    Is an  required?:  No DB - 12/29/2023 1111    How does the primary learner prefer to learn new concepts?:  Listening, Reading DB - 12/29/2023 1111    How often do you need to have someone help you read instructions, pamphlets, or written material from your doctor or pharmacy?:  Never DB - 12/29/2023 1111        CO-LEARNER #1     No question answered        CO-LEARNER #2     No question answered        SPECIAL TOPICS     No question answered        ANSWERED BY:     No question answered        Edit History       Hui Hayes, RN -  (Nurse)   12/29/2023 1111

## 2023-12-28 NOTE — PHYSICIAN QUERY
PT Name: Alexa Otero  MR #: [id number]     DOCUMENTATION CLARIFICATION      CDS/: Le Alaniz               Contact information: [phone]    This form is a permanent document in the medical record.     Query Date: December 28, 2023    Dear Provider,  By submitting this query, we are merely seeking further clarification of documentation.  Please utilize your independent clinical judgment when addressing the question(s) below.     The Medical Record contains the following:    Supporting Clinical Findings Location in Medical Record   Patient being admitted for sepsis of unknown origin     Questionable sepsis, no obvious source at this time.     Pneumonia  Pleural Effusion H&P, IM PN, DS    H&P    Pulmonology Consult, IM PN 12/04 - DS   Temp -  100.5    WBC -  7.14, 6.02, 6.28, 5.65    CRP = 378.6     Creatinine - 1.0, 0.9, 1.1, 0.9    Bilirubin - 0.5, 0.5, 0.4, 0.4    Platelets - 273, 204, 261, 268    Blood Cultures - Negative    MAP - 71, 94.33, 87.33, 92    P/F Ratio - 280   Vitals, Epic Labs, and Microbiology 12/03-12/06             Please clarify if the Sepsis diagnosis has been:    [  x] Ruled In   [  ] Ruled Out   [  ] Other/Clarification of findings (please specify): _______________

## 2023-12-28 NOTE — LETTER
Alexa Otero  [address] [place] [id number]    Dear Alexa Otero,     Welcome to [facility] Outpatient Care Management Program. We are here to assist patients with multiple long-term (chronic) conditions who often need more personalized healthcare.    It was a pleasure talking with you today. My name is Hui Hayes RN. I look forward to working with you as your Care Manager. I will be contacting you by telephone routinely to help coordinate care and resolve issues.    My goal is to help you function at the healthiest and highest level possible. You can contact me directly at [phone].    As an [facility] patient with [employer], some of the services we provide, at no cost to you, include:     Development of an individualized care plan with a Registered Nurse   Connection with a   Assistance from a Community Health Worker  Connection with available resources and services    Coordinate communication among your care team members   Provide coaching and education  Help you understand your doctor's treatment plan  Help you obtain information about your insurance coverage.    All services provided by [facility]s Outpatient Care Managers and other care team members are coordinated with and communicated to your primary care team.      As part of your enrollment, you will be receiving education materials and more information about these services in your [facility] account, by phone, or through the mail. If you do not wish to participate or receive information, you can Opt Out by contacting our office at [phone].      Sincerely,        Hui Hayes RN  [facility]   Outpatient Care Management

## 2023-12-29 ENCOUNTER — TELEPHONE (OUTPATIENT)
Dept: FAMILY MEDICINE | Facility: CLINIC | Age: 83
End: 2023-12-29
Payer: MEDICARE

## 2023-12-29 ENCOUNTER — PATIENT MESSAGE (OUTPATIENT)
Dept: ADMINISTRATIVE | Facility: OTHER | Age: 83
End: 2023-12-29
Payer: MEDICARE

## 2023-12-29 NOTE — TELEPHONE ENCOUNTER
"----- Message from Hui Hayes RN sent at 12/29/2023 11:46 AM CST -----  Good morning,     I recently enrolled your patient, Mrs. Otero, in our OPCM program as she is a great candidate for our services.    Mrs. Otero was recently hospitalized from 12/10/23 to 12/13/23 - had EGD on 12/11/23 for epigastric discomfort; previous hospitalization on 12/3/23 to 12/6/23 for pneumonia and pleural effusion; on 12/12/23, she had a bronchoscopy which repeated on 12/15/23.     During my assessment we completed medication reconciliation. She reported that she forgets to take her morning medications on occasion; sometimes due to "feeling sick". Compliance was encouraged.     In addition, she reported she has lost 20 lbs over the last 6 months (current weight 115 lbs). She notes her appetite fluctuates.    Furthermore, she is waiting on an oxygen evaluation with pulmonology as she is sob with activity.  Education was provided on oxygen therapy preemptively while waiting for evaluation.    I have individualized nursing care plans focused on fatigue and medication adherence.      If you have any questions, please reach out to me at [phone] or via in-basket messaging.    Kind regards,    Hui Hayes RN, BSN, CCM [facility]  [phone]      "

## 2024-01-01 ENCOUNTER — HOSPITAL ENCOUNTER (INPATIENT)
Facility: HOSPITAL | Age: 84
LOS: 1 days | DRG: 951 | End: 2024-11-19
Attending: INTERNAL MEDICINE | Admitting: INTERNAL MEDICINE
Payer: MEDICARE

## 2024-01-01 ENCOUNTER — HOSPITAL ENCOUNTER (INPATIENT)
Facility: HOSPITAL | Age: 84
LOS: 17 days | Discharge: HOSPICE | DRG: 871 | End: 2024-11-18
Attending: EMERGENCY MEDICINE | Admitting: HOSPITALIST
Payer: MEDICARE

## 2024-01-01 VITALS
WEIGHT: 109.13 LBS | RESPIRATION RATE: 16 BRPM | OXYGEN SATURATION: 91 % | TEMPERATURE: 98 F | BODY MASS INDEX: 18.63 KG/M2 | DIASTOLIC BLOOD PRESSURE: 75 MMHG | SYSTOLIC BLOOD PRESSURE: 152 MMHG | HEIGHT: 64 IN | HEART RATE: 74 BPM

## 2024-01-01 VITALS
WEIGHT: 109.13 LBS | SYSTOLIC BLOOD PRESSURE: 152 MMHG | RESPIRATION RATE: 10 BRPM | TEMPERATURE: 98 F | DIASTOLIC BLOOD PRESSURE: 75 MMHG | HEART RATE: 81 BPM | HEIGHT: 64 IN | OXYGEN SATURATION: 41 % | BODY MASS INDEX: 18.63 KG/M2

## 2024-01-01 DIAGNOSIS — R07.9 CHEST PAIN: ICD-10-CM

## 2024-01-01 DIAGNOSIS — Z51.5 END OF LIFE CARE: ICD-10-CM

## 2024-01-01 DIAGNOSIS — J18.9 PNEUMONIA DUE TO INFECTIOUS ORGANISM, UNSPECIFIED LATERALITY, UNSPECIFIED PART OF LUNG: Primary | ICD-10-CM

## 2024-01-01 DIAGNOSIS — R06.02 SHORTNESS OF BREATH: ICD-10-CM

## 2024-01-01 DIAGNOSIS — J96.21 ACUTE ON CHRONIC HYPOXIC RESPIRATORY FAILURE: ICD-10-CM

## 2024-01-01 LAB
ABO + RH BLD: NORMAL
ADENOVIRUS: NOT DETECTED
ALBUMIN SERPL BCP-MCNC: 2.3 G/DL (ref 3.5–5.2)
ALBUMIN SERPL BCP-MCNC: 2.4 G/DL (ref 3.5–5.2)
ALBUMIN SERPL BCP-MCNC: 2.5 G/DL (ref 3.5–5.2)
ALBUMIN SERPL BCP-MCNC: 2.6 G/DL (ref 3.5–5.2)
ALBUMIN SERPL BCP-MCNC: 2.7 G/DL (ref 3.5–5.2)
ALBUMIN SERPL BCP-MCNC: 2.7 G/DL (ref 3.5–5.2)
ALBUMIN SERPL BCP-MCNC: 2.9 G/DL (ref 3.5–5.2)
ALBUMIN SERPL BCP-MCNC: 3 G/DL (ref 3.5–5.2)
ALBUMIN SERPL BCP-MCNC: 3 G/DL (ref 3.5–5.2)
ALLENS TEST: ABNORMAL
ALP SERPL-CCNC: 42 U/L (ref 55–135)
ALP SERPL-CCNC: 43 U/L (ref 55–135)
ALP SERPL-CCNC: 43 U/L (ref 55–135)
ALP SERPL-CCNC: 44 U/L (ref 55–135)
ALP SERPL-CCNC: 44 U/L (ref 55–135)
ALP SERPL-CCNC: 45 U/L (ref 55–135)
ALP SERPL-CCNC: 45 U/L (ref 55–135)
ALP SERPL-CCNC: 46 U/L (ref 55–135)
ALP SERPL-CCNC: 47 U/L (ref 55–135)
ALP SERPL-CCNC: 47 U/L (ref 55–135)
ALP SERPL-CCNC: 48 U/L (ref 55–135)
ALP SERPL-CCNC: 48 U/L (ref 55–135)
ALP SERPL-CCNC: 50 U/L (ref 55–135)
ALP SERPL-CCNC: 50 U/L (ref 55–135)
ALP SERPL-CCNC: 57 U/L (ref 55–135)
ALT SERPL W/O P-5'-P-CCNC: 10 U/L (ref 10–44)
ALT SERPL W/O P-5'-P-CCNC: 15 U/L (ref 10–44)
ALT SERPL W/O P-5'-P-CCNC: 16 U/L (ref 10–44)
ALT SERPL W/O P-5'-P-CCNC: 16 U/L (ref 10–44)
ALT SERPL W/O P-5'-P-CCNC: 17 U/L (ref 10–44)
ALT SERPL W/O P-5'-P-CCNC: 18 U/L (ref 10–44)
ALT SERPL W/O P-5'-P-CCNC: 18 U/L (ref 10–44)
ALT SERPL W/O P-5'-P-CCNC: 19 U/L (ref 10–44)
ALT SERPL W/O P-5'-P-CCNC: 20 U/L (ref 10–44)
ALT SERPL W/O P-5'-P-CCNC: 20 U/L (ref 10–44)
ALT SERPL W/O P-5'-P-CCNC: 21 U/L (ref 10–44)
ALT SERPL W/O P-5'-P-CCNC: 30 U/L (ref 10–44)
ALT SERPL W/O P-5'-P-CCNC: 8 U/L (ref 10–44)
ALT SERPL W/O P-5'-P-CCNC: 9 U/L (ref 10–44)
ALT SERPL W/O P-5'-P-CCNC: 9 U/L (ref 10–44)
ANION GAP SERPL CALC-SCNC: 1 MMOL/L (ref 8–16)
ANION GAP SERPL CALC-SCNC: 10 MMOL/L (ref 8–16)
ANION GAP SERPL CALC-SCNC: 12 MMOL/L (ref 8–16)
ANION GAP SERPL CALC-SCNC: 2 MMOL/L (ref 8–16)
ANION GAP SERPL CALC-SCNC: 2 MMOL/L (ref 8–16)
ANION GAP SERPL CALC-SCNC: 4 MMOL/L (ref 8–16)
ANION GAP SERPL CALC-SCNC: 5 MMOL/L (ref 8–16)
ANION GAP SERPL CALC-SCNC: 6 MMOL/L (ref 8–16)
ANION GAP SERPL CALC-SCNC: 7 MMOL/L (ref 8–16)
ANION GAP SERPL CALC-SCNC: 8 MMOL/L (ref 8–16)
ANION GAP SERPL CALC-SCNC: 8 MMOL/L (ref 8–16)
ANION GAP SERPL CALC-SCNC: 9 MMOL/L (ref 8–16)
ANISOCYTOSIS BLD QL SMEAR: SLIGHT
ANISOCYTOSIS BLD QL SMEAR: SLIGHT
AST SERPL-CCNC: 10 U/L (ref 10–40)
AST SERPL-CCNC: 12 U/L (ref 10–40)
AST SERPL-CCNC: 12 U/L (ref 10–40)
AST SERPL-CCNC: 13 U/L (ref 10–40)
AST SERPL-CCNC: 15 U/L (ref 10–40)
AST SERPL-CCNC: 16 U/L (ref 10–40)
AST SERPL-CCNC: 18 U/L (ref 10–40)
AST SERPL-CCNC: 18 U/L (ref 10–40)
BACTERIA #/AREA URNS HPF: ABNORMAL /HPF
BACTERIA BLD CULT: NORMAL
BACTERIA BLD CULT: NORMAL
BASOPHILS # BLD AUTO: 0 K/UL (ref 0–0.2)
BASOPHILS # BLD AUTO: 0.01 K/UL (ref 0–0.2)
BASOPHILS NFR BLD: 0 % (ref 0–1.9)
BASOPHILS NFR BLD: 0.1 % (ref 0–1.9)
BILIRUB SERPL-MCNC: 0.3 MG/DL (ref 0.1–1)
BILIRUB SERPL-MCNC: 0.4 MG/DL (ref 0.1–1)
BILIRUB SERPL-MCNC: 0.5 MG/DL (ref 0.1–1)
BILIRUB SERPL-MCNC: 0.5 MG/DL (ref 0.1–1)
BILIRUB SERPL-MCNC: 0.6 MG/DL (ref 0.1–1)
BILIRUB UR QL STRIP: NEGATIVE
BLD GP AB SCN CELLS X3 SERPL QL: NORMAL
BLD PROD TYP BPU: NORMAL
BLOOD UNIT EXPIRATION DATE: NORMAL
BLOOD UNIT TYPE CODE: 6200
BLOOD UNIT TYPE: NORMAL
BNP SERPL-MCNC: 501 PG/ML (ref 0–99)
BORDETELLA PARAPERTUSSIS (IS1001): NOT DETECTED
BORDETELLA PERTUSSIS (PTXP): NOT DETECTED
BUN SERPL-MCNC: 22 MG/DL (ref 8–23)
BUN SERPL-MCNC: 24 MG/DL (ref 8–23)
BUN SERPL-MCNC: 28 MG/DL (ref 8–23)
BUN SERPL-MCNC: 37 MG/DL (ref 8–23)
BUN SERPL-MCNC: 39 MG/DL (ref 8–23)
BUN SERPL-MCNC: 42 MG/DL (ref 8–23)
BUN SERPL-MCNC: 44 MG/DL (ref 8–23)
BUN SERPL-MCNC: 46 MG/DL (ref 8–23)
BUN SERPL-MCNC: 48 MG/DL (ref 8–23)
BUN SERPL-MCNC: 50 MG/DL (ref 8–23)
BUN SERPL-MCNC: 50 MG/DL (ref 8–23)
BUN SERPL-MCNC: 51 MG/DL (ref 8–23)
BUN SERPL-MCNC: 53 MG/DL (ref 8–23)
BUN SERPL-MCNC: 54 MG/DL (ref 8–23)
BUN SERPL-MCNC: 56 MG/DL (ref 8–23)
BUN SERPL-MCNC: 58 MG/DL (ref 8–23)
CALCIUM SERPL-MCNC: 7.9 MG/DL (ref 8.7–10.5)
CALCIUM SERPL-MCNC: 7.9 MG/DL (ref 8.7–10.5)
CALCIUM SERPL-MCNC: 8 MG/DL (ref 8.7–10.5)
CALCIUM SERPL-MCNC: 8.3 MG/DL (ref 8.7–10.5)
CALCIUM SERPL-MCNC: 8.4 MG/DL (ref 8.7–10.5)
CALCIUM SERPL-MCNC: 8.6 MG/DL (ref 8.7–10.5)
CALCIUM SERPL-MCNC: 8.6 MG/DL (ref 8.7–10.5)
CALCIUM SERPL-MCNC: 8.7 MG/DL (ref 8.7–10.5)
CALCIUM SERPL-MCNC: 8.7 MG/DL (ref 8.7–10.5)
CALCIUM SERPL-MCNC: 8.8 MG/DL (ref 8.7–10.5)
CALCIUM SERPL-MCNC: 9.1 MG/DL (ref 8.7–10.5)
CALCIUM SERPL-MCNC: 9.3 MG/DL (ref 8.7–10.5)
CHLAMYDIA PNEUMONIAE: NOT DETECTED
CHLORIDE SERPL-SCNC: 92 MMOL/L (ref 95–110)
CHLORIDE SERPL-SCNC: 93 MMOL/L (ref 95–110)
CHLORIDE SERPL-SCNC: 95 MMOL/L (ref 95–110)
CHLORIDE SERPL-SCNC: 95 MMOL/L (ref 95–110)
CHLORIDE SERPL-SCNC: 96 MMOL/L (ref 95–110)
CHLORIDE SERPL-SCNC: 97 MMOL/L (ref 95–110)
CHLORIDE SERPL-SCNC: 97 MMOL/L (ref 95–110)
CHLORIDE SERPL-SCNC: 98 MMOL/L (ref 95–110)
CHLORIDE SERPL-SCNC: 99 MMOL/L (ref 95–110)
CLARITY UR: CLEAR
CO2 SERPL-SCNC: 29 MMOL/L (ref 23–29)
CO2 SERPL-SCNC: 30 MMOL/L (ref 23–29)
CO2 SERPL-SCNC: 31 MMOL/L (ref 23–29)
CO2 SERPL-SCNC: 31 MMOL/L (ref 23–29)
CO2 SERPL-SCNC: 32 MMOL/L (ref 23–29)
CO2 SERPL-SCNC: 33 MMOL/L (ref 23–29)
CO2 SERPL-SCNC: 35 MMOL/L (ref 23–29)
CO2 SERPL-SCNC: 35 MMOL/L (ref 23–29)
CO2 SERPL-SCNC: 36 MMOL/L (ref 23–29)
CO2 SERPL-SCNC: 37 MMOL/L (ref 23–29)
CO2 SERPL-SCNC: 38 MMOL/L (ref 23–29)
CODING SYSTEM: NORMAL
COLOR UR: YELLOW
CORONAVIRUS 229E, COMMON COLD VIRUS: NOT DETECTED
CORONAVIRUS HKU1, COMMON COLD VIRUS: NOT DETECTED
CORONAVIRUS NL63, COMMON COLD VIRUS: NOT DETECTED
CORONAVIRUS OC43, COMMON COLD VIRUS: NOT DETECTED
CREAT SERPL-MCNC: 1 MG/DL (ref 0.5–1.4)
CREAT SERPL-MCNC: 1 MG/DL (ref 0.5–1.4)
CREAT SERPL-MCNC: 1.1 MG/DL (ref 0.5–1.4)
CREAT SERPL-MCNC: 1.3 MG/DL (ref 0.5–1.4)
CREAT SERPL-MCNC: 1.4 MG/DL (ref 0.5–1.4)
CREAT SERPL-MCNC: 1.5 MG/DL (ref 0.5–1.4)
CREAT SERPL-MCNC: 1.5 MG/DL (ref 0.5–1.4)
CROSSMATCH INTERPRETATION: NORMAL
CRP SERPL-MCNC: 36.9 MG/DL
DELSYS: ABNORMAL
DIFFERENTIAL METHOD BLD: ABNORMAL
DISPENSE STATUS: NORMAL
EOSINOPHIL # BLD AUTO: 0 K/UL (ref 0–0.5)
EOSINOPHIL NFR BLD: 0 % (ref 0–8)
EOSINOPHIL NFR BLD: 0.1 % (ref 0–8)
EOSINOPHIL NFR BLD: 0.1 % (ref 0–8)
EOSINOPHIL NFR BLD: 0.2 % (ref 0–8)
ERYTHROCYTE [DISTWIDTH] IN BLOOD BY AUTOMATED COUNT: 17.6 % (ref 11.5–14.5)
ERYTHROCYTE [DISTWIDTH] IN BLOOD BY AUTOMATED COUNT: 17.7 % (ref 11.5–14.5)
ERYTHROCYTE [DISTWIDTH] IN BLOOD BY AUTOMATED COUNT: 17.7 % (ref 11.5–14.5)
ERYTHROCYTE [DISTWIDTH] IN BLOOD BY AUTOMATED COUNT: 17.8 % (ref 11.5–14.5)
ERYTHROCYTE [DISTWIDTH] IN BLOOD BY AUTOMATED COUNT: 17.8 % (ref 11.5–14.5)
ERYTHROCYTE [DISTWIDTH] IN BLOOD BY AUTOMATED COUNT: 17.9 % (ref 11.5–14.5)
ERYTHROCYTE [DISTWIDTH] IN BLOOD BY AUTOMATED COUNT: 18 % (ref 11.5–14.5)
ERYTHROCYTE [DISTWIDTH] IN BLOOD BY AUTOMATED COUNT: 18 % (ref 11.5–14.5)
ERYTHROCYTE [DISTWIDTH] IN BLOOD BY AUTOMATED COUNT: 18.1 % (ref 11.5–14.5)
ERYTHROCYTE [DISTWIDTH] IN BLOOD BY AUTOMATED COUNT: 18.1 % (ref 11.5–14.5)
ERYTHROCYTE [DISTWIDTH] IN BLOOD BY AUTOMATED COUNT: 18.2 % (ref 11.5–14.5)
ERYTHROCYTE [DISTWIDTH] IN BLOOD BY AUTOMATED COUNT: 18.2 % (ref 11.5–14.5)
ERYTHROCYTE [DISTWIDTH] IN BLOOD BY AUTOMATED COUNT: 18.5 % (ref 11.5–14.5)
ERYTHROCYTE [DISTWIDTH] IN BLOOD BY AUTOMATED COUNT: 18.5 % (ref 11.5–14.5)
ERYTHROCYTE [DISTWIDTH] IN BLOOD BY AUTOMATED COUNT: 18.6 % (ref 11.5–14.5)
EST. GFR  (NO RACE VARIABLE): 34.4 ML/MIN/1.73 M^2
EST. GFR  (NO RACE VARIABLE): 34.4 ML/MIN/1.73 M^2
EST. GFR  (NO RACE VARIABLE): 37.3 ML/MIN/1.73 M^2
EST. GFR  (NO RACE VARIABLE): 40.8 ML/MIN/1.73 M^2
EST. GFR  (NO RACE VARIABLE): 49.9 ML/MIN/1.73 M^2
EST. GFR  (NO RACE VARIABLE): 55.9 ML/MIN/1.73 M^2
EST. GFR  (NO RACE VARIABLE): 55.9 ML/MIN/1.73 M^2
FLOW: 6
FLUBV RNA NPH QL NAA+NON-PROBE: NOT DETECTED
GLUCOSE SERPL-MCNC: 123 MG/DL (ref 70–110)
GLUCOSE SERPL-MCNC: 140 MG/DL (ref 70–110)
GLUCOSE SERPL-MCNC: 141 MG/DL (ref 70–110)
GLUCOSE SERPL-MCNC: 143 MG/DL (ref 70–110)
GLUCOSE SERPL-MCNC: 146 MG/DL (ref 70–110)
GLUCOSE SERPL-MCNC: 156 MG/DL (ref 70–110)
GLUCOSE SERPL-MCNC: 191 MG/DL (ref 70–110)
GLUCOSE SERPL-MCNC: 192 MG/DL (ref 70–110)
GLUCOSE SERPL-MCNC: 227 MG/DL (ref 70–110)
GLUCOSE SERPL-MCNC: 230 MG/DL (ref 70–110)
GLUCOSE SERPL-MCNC: 233 MG/DL (ref 70–110)
GLUCOSE SERPL-MCNC: 240 MG/DL (ref 70–110)
GLUCOSE SERPL-MCNC: 246 MG/DL (ref 70–110)
GLUCOSE SERPL-MCNC: 261 MG/DL (ref 70–110)
GLUCOSE SERPL-MCNC: 262 MG/DL (ref 70–110)
GLUCOSE SERPL-MCNC: 265 MG/DL (ref 70–110)
GLUCOSE UR QL STRIP: ABNORMAL
HCO3 UR-SCNC: 34.7 MMOL/L (ref 24–28)
HCT VFR BLD AUTO: 23.3 % (ref 37–48.5)
HCT VFR BLD AUTO: 23.7 % (ref 37–48.5)
HCT VFR BLD AUTO: 23.9 % (ref 37–48.5)
HCT VFR BLD AUTO: 24.9 % (ref 37–48.5)
HCT VFR BLD AUTO: 25.5 % (ref 37–48.5)
HCT VFR BLD AUTO: 26.1 % (ref 37–48.5)
HCT VFR BLD AUTO: 26.7 % (ref 37–48.5)
HCT VFR BLD AUTO: 27 % (ref 37–48.5)
HCT VFR BLD AUTO: 27.9 % (ref 37–48.5)
HCT VFR BLD AUTO: 27.9 % (ref 37–48.5)
HCT VFR BLD AUTO: 28.5 % (ref 37–48.5)
HCT VFR BLD AUTO: 28.5 % (ref 37–48.5)
HCT VFR BLD AUTO: 31.8 % (ref 37–48.5)
HGB BLD-MCNC: 7.3 G/DL (ref 12–16)
HGB BLD-MCNC: 7.5 G/DL (ref 12–16)
HGB BLD-MCNC: 7.6 G/DL (ref 12–16)
HGB BLD-MCNC: 7.6 G/DL (ref 12–16)
HGB BLD-MCNC: 7.7 G/DL (ref 12–16)
HGB BLD-MCNC: 7.9 G/DL (ref 12–16)
HGB BLD-MCNC: 8.3 G/DL (ref 12–16)
HGB BLD-MCNC: 8.4 G/DL (ref 12–16)
HGB BLD-MCNC: 8.4 G/DL (ref 12–16)
HGB BLD-MCNC: 8.5 G/DL (ref 12–16)
HGB BLD-MCNC: 8.5 G/DL (ref 12–16)
HGB BLD-MCNC: 8.7 G/DL (ref 12–16)
HGB BLD-MCNC: 9 G/DL (ref 12–16)
HGB BLD-MCNC: 9.1 G/DL (ref 12–16)
HGB BLD-MCNC: 9.7 G/DL (ref 12–16)
HGB UR QL STRIP: NEGATIVE
HPIV1 RNA NPH QL NAA+NON-PROBE: NOT DETECTED
HPIV2 RNA NPH QL NAA+NON-PROBE: NOT DETECTED
HPIV3 RNA NPH QL NAA+NON-PROBE: NOT DETECTED
HPIV4 RNA NPH QL NAA+NON-PROBE: NOT DETECTED
HUMAN METAPNEUMOVIRUS: NOT DETECTED
HYALINE CASTS #/AREA URNS LPF: 0 /LPF
IMM GRANULOCYTES # BLD AUTO: 0.06 K/UL (ref 0–0.04)
IMM GRANULOCYTES # BLD AUTO: 0.07 K/UL (ref 0–0.04)
IMM GRANULOCYTES # BLD AUTO: 0.08 K/UL (ref 0–0.04)
IMM GRANULOCYTES # BLD AUTO: 0.08 K/UL (ref 0–0.04)
IMM GRANULOCYTES # BLD AUTO: 0.09 K/UL (ref 0–0.04)
IMM GRANULOCYTES # BLD AUTO: 0.1 K/UL (ref 0–0.04)
IMM GRANULOCYTES # BLD AUTO: 0.1 K/UL (ref 0–0.04)
IMM GRANULOCYTES # BLD AUTO: 0.12 K/UL (ref 0–0.04)
IMM GRANULOCYTES # BLD AUTO: 0.16 K/UL (ref 0–0.04)
IMM GRANULOCYTES # BLD AUTO: 0.17 K/UL (ref 0–0.04)
IMM GRANULOCYTES # BLD AUTO: 0.2 K/UL (ref 0–0.04)
IMM GRANULOCYTES # BLD AUTO: 0.2 K/UL (ref 0–0.04)
IMM GRANULOCYTES # BLD AUTO: 0.25 K/UL (ref 0–0.04)
IMM GRANULOCYTES # BLD AUTO: 0.31 K/UL (ref 0–0.04)
IMM GRANULOCYTES # BLD AUTO: ABNORMAL K/UL (ref 0–0.04)
IMM GRANULOCYTES NFR BLD AUTO: 0.8 % (ref 0–0.5)
IMM GRANULOCYTES NFR BLD AUTO: 0.9 % (ref 0–0.5)
IMM GRANULOCYTES NFR BLD AUTO: 1 % (ref 0–0.5)
IMM GRANULOCYTES NFR BLD AUTO: 1.1 % (ref 0–0.5)
IMM GRANULOCYTES NFR BLD AUTO: 1.2 % (ref 0–0.5)
IMM GRANULOCYTES NFR BLD AUTO: 1.3 % (ref 0–0.5)
IMM GRANULOCYTES NFR BLD AUTO: 1.4 % (ref 0–0.5)
IMM GRANULOCYTES NFR BLD AUTO: 1.5 % (ref 0–0.5)
IMM GRANULOCYTES NFR BLD AUTO: 1.8 % (ref 0–0.5)
IMM GRANULOCYTES NFR BLD AUTO: 1.8 % (ref 0–0.5)
IMM GRANULOCYTES NFR BLD AUTO: 2.4 % (ref 0–0.5)
IMM GRANULOCYTES NFR BLD AUTO: 3.1 % (ref 0–0.5)
IMM GRANULOCYTES NFR BLD AUTO: ABNORMAL % (ref 0–0.5)
INFLUENZA A (SUBTYPES H1,H1-2009,H3): NOT DETECTED
KETONES UR QL STRIP: NEGATIVE
LACTATE SERPL-SCNC: 0.7 MMOL/L (ref 0.5–1.9)
LDH SERPL L TO P-CCNC: 2.17 MMOL/L (ref 0.5–2.2)
LDH SERPL L TO P-CCNC: 413 U/L (ref 110–260)
LEUKOCYTE ESTERASE UR QL STRIP: NEGATIVE
LYMPHOCYTES # BLD AUTO: 0.1 K/UL (ref 1–4.8)
LYMPHOCYTES # BLD AUTO: 0.1 K/UL (ref 1–4.8)
LYMPHOCYTES # BLD AUTO: 0.2 K/UL (ref 1–4.8)
LYMPHOCYTES # BLD AUTO: 0.3 K/UL (ref 1–4.8)
LYMPHOCYTES # BLD AUTO: 0.3 K/UL (ref 1–4.8)
LYMPHOCYTES # BLD AUTO: 0.4 K/UL (ref 1–4.8)
LYMPHOCYTES # BLD AUTO: 0.4 K/UL (ref 1–4.8)
LYMPHOCYTES # BLD AUTO: 0.6 K/UL (ref 1–4.8)
LYMPHOCYTES # BLD AUTO: 0.6 K/UL (ref 1–4.8)
LYMPHOCYTES # BLD AUTO: 0.7 K/UL (ref 1–4.8)
LYMPHOCYTES # BLD AUTO: 0.7 K/UL (ref 1–4.8)
LYMPHOCYTES NFR BLD: 0.7 % (ref 18–48)
LYMPHOCYTES NFR BLD: 0.9 % (ref 18–48)
LYMPHOCYTES NFR BLD: 1 % (ref 18–48)
LYMPHOCYTES NFR BLD: 1.1 % (ref 18–48)
LYMPHOCYTES NFR BLD: 1.3 % (ref 18–48)
LYMPHOCYTES NFR BLD: 10 % (ref 18–48)
LYMPHOCYTES NFR BLD: 10.7 % (ref 18–48)
LYMPHOCYTES NFR BLD: 3.5 % (ref 18–48)
LYMPHOCYTES NFR BLD: 4.9 % (ref 18–48)
LYMPHOCYTES NFR BLD: 5 % (ref 18–48)
LYMPHOCYTES NFR BLD: 5.1 % (ref 18–48)
LYMPHOCYTES NFR BLD: 5.5 % (ref 18–48)
LYMPHOCYTES NFR BLD: 6.8 % (ref 18–48)
LYMPHOCYTES NFR BLD: 7.7 % (ref 18–48)
LYMPHOCYTES NFR BLD: 8.5 % (ref 18–48)
MAGNESIUM SERPL-MCNC: 1.7 MG/DL (ref 1.6–2.6)
MAGNESIUM SERPL-MCNC: 1.8 MG/DL (ref 1.6–2.6)
MAGNESIUM SERPL-MCNC: 1.9 MG/DL (ref 1.6–2.6)
MAGNESIUM SERPL-MCNC: 2 MG/DL (ref 1.6–2.6)
MAGNESIUM SERPL-MCNC: 2 MG/DL (ref 1.6–2.6)
MAGNESIUM SERPL-MCNC: 2.1 MG/DL (ref 1.6–2.6)
MAGNESIUM SERPL-MCNC: 2.2 MG/DL (ref 1.6–2.6)
MCH RBC QN AUTO: 31.5 PG (ref 27–31)
MCH RBC QN AUTO: 31.5 PG (ref 27–31)
MCH RBC QN AUTO: 31.6 PG (ref 27–31)
MCH RBC QN AUTO: 31.7 PG (ref 27–31)
MCH RBC QN AUTO: 32.1 PG (ref 27–31)
MCH RBC QN AUTO: 32.1 PG (ref 27–31)
MCH RBC QN AUTO: 32.3 PG (ref 27–31)
MCH RBC QN AUTO: 32.5 PG (ref 27–31)
MCH RBC QN AUTO: 32.6 PG (ref 27–31)
MCHC RBC AUTO-ENTMCNC: 30.5 G/DL (ref 32–36)
MCHC RBC AUTO-ENTMCNC: 30.9 G/DL (ref 32–36)
MCHC RBC AUTO-ENTMCNC: 31.3 G/DL (ref 32–36)
MCHC RBC AUTO-ENTMCNC: 31.5 G/DL (ref 32–36)
MCHC RBC AUTO-ENTMCNC: 31.5 G/DL (ref 32–36)
MCHC RBC AUTO-ENTMCNC: 31.6 G/DL (ref 32–36)
MCHC RBC AUTO-ENTMCNC: 31.7 G/DL (ref 32–36)
MCHC RBC AUTO-ENTMCNC: 31.8 G/DL (ref 32–36)
MCHC RBC AUTO-ENTMCNC: 31.9 G/DL (ref 32–36)
MCHC RBC AUTO-ENTMCNC: 32.2 G/DL (ref 32–36)
MCHC RBC AUTO-ENTMCNC: 32.3 G/DL (ref 32–36)
MCHC RBC AUTO-ENTMCNC: 32.5 G/DL (ref 32–36)
MCV RBC AUTO: 100 FL (ref 82–98)
MCV RBC AUTO: 101 FL (ref 82–98)
MCV RBC AUTO: 101 FL (ref 82–98)
MCV RBC AUTO: 102 FL (ref 82–98)
MCV RBC AUTO: 102 FL (ref 82–98)
MCV RBC AUTO: 103 FL (ref 82–98)
MCV RBC AUTO: 104 FL (ref 82–98)
MCV RBC AUTO: 105 FL (ref 82–98)
MICROSCOPIC COMMENT: ABNORMAL
MODE: ABNORMAL
MONOCYTES # BLD AUTO: 0.1 K/UL (ref 0.3–1)
MONOCYTES # BLD AUTO: 0.1 K/UL (ref 0.3–1)
MONOCYTES # BLD AUTO: 0.2 K/UL (ref 0.3–1)
MONOCYTES # BLD AUTO: 0.5 K/UL (ref 0.3–1)
MONOCYTES # BLD AUTO: 0.6 K/UL (ref 0.3–1)
MONOCYTES # BLD AUTO: 0.7 K/UL (ref 0.3–1)
MONOCYTES # BLD AUTO: 0.7 K/UL (ref 0.3–1)
MONOCYTES # BLD AUTO: 1.2 K/UL (ref 0.3–1)
MONOCYTES # BLD AUTO: 1.3 K/UL (ref 0.3–1)
MONOCYTES # BLD AUTO: 1.3 K/UL (ref 0.3–1)
MONOCYTES NFR BLD: 0 % (ref 4–15)
MONOCYTES NFR BLD: 0.4 % (ref 4–15)
MONOCYTES NFR BLD: 0.8 % (ref 4–15)
MONOCYTES NFR BLD: 1.9 % (ref 4–15)
MONOCYTES NFR BLD: 10.8 % (ref 4–15)
MONOCYTES NFR BLD: 13.3 % (ref 4–15)
MONOCYTES NFR BLD: 13.6 % (ref 4–15)
MONOCYTES NFR BLD: 14.3 % (ref 4–15)
MONOCYTES NFR BLD: 17.8 % (ref 4–15)
MONOCYTES NFR BLD: 2.2 % (ref 4–15)
MONOCYTES NFR BLD: 22.5 % (ref 4–15)
MONOCYTES NFR BLD: 3.5 % (ref 4–15)
MONOCYTES NFR BLD: 4.2 % (ref 4–15)
MONOCYTES NFR BLD: 4.4 % (ref 4–15)
MONOCYTES NFR BLD: 7.2 % (ref 4–15)
MRSA SCREEN BY PCR: NEGATIVE
MYCOPLASMA PNEUMONIAE: NOT DETECTED
NEUTROPHILS # BLD AUTO: 10.2 K/UL (ref 1.8–7.7)
NEUTROPHILS # BLD AUTO: 10.4 K/UL (ref 1.8–7.7)
NEUTROPHILS # BLD AUTO: 11.6 K/UL (ref 1.8–7.7)
NEUTROPHILS # BLD AUTO: 14.5 K/UL (ref 1.8–7.7)
NEUTROPHILS # BLD AUTO: 15.3 K/UL (ref 1.8–7.7)
NEUTROPHILS # BLD AUTO: 16 K/UL (ref 1.8–7.7)
NEUTROPHILS # BLD AUTO: 16.4 K/UL (ref 1.8–7.7)
NEUTROPHILS # BLD AUTO: 2.8 K/UL (ref 1.8–7.7)
NEUTROPHILS # BLD AUTO: 3.4 K/UL (ref 1.8–7.7)
NEUTROPHILS # BLD AUTO: 3.5 K/UL (ref 1.8–7.7)
NEUTROPHILS # BLD AUTO: 3.7 K/UL (ref 1.8–7.7)
NEUTROPHILS # BLD AUTO: 5.3 K/UL (ref 1.8–7.7)
NEUTROPHILS # BLD AUTO: 6.8 K/UL (ref 1.8–7.7)
NEUTROPHILS # BLD AUTO: 7.7 K/UL (ref 1.8–7.7)
NEUTROPHILS NFR BLD: 65.5 % (ref 38–73)
NEUTROPHILS NFR BLD: 71.1 % (ref 38–73)
NEUTROPHILS NFR BLD: 75.5 % (ref 38–73)
NEUTROPHILS NFR BLD: 77 % (ref 38–73)
NEUTROPHILS NFR BLD: 77.6 % (ref 38–73)
NEUTROPHILS NFR BLD: 82.8 % (ref 38–73)
NEUTROPHILS NFR BLD: 87 % (ref 38–73)
NEUTROPHILS NFR BLD: 90.2 % (ref 38–73)
NEUTROPHILS NFR BLD: 91.8 % (ref 38–73)
NEUTROPHILS NFR BLD: 93.7 % (ref 38–73)
NEUTROPHILS NFR BLD: 94.1 % (ref 38–73)
NEUTROPHILS NFR BLD: 94.6 % (ref 38–73)
NEUTROPHILS NFR BLD: 95 % (ref 38–73)
NEUTROPHILS NFR BLD: 95.6 % (ref 38–73)
NEUTROPHILS NFR BLD: 96.8 % (ref 38–73)
NITRITE UR QL STRIP: NEGATIVE
NRBC BLD-RTO: 0 /100 WBC
NUM UNITS TRANS PACKED RBC: NORMAL
OHS QRS DURATION: 56 MS
OHS QTC CALCULATION: 482 MS
PCO2 BLDA: 50.9 MMHG (ref 35–45)
PH SMN: 7.44 [PH] (ref 7.35–7.45)
PH UR STRIP: 7 [PH] (ref 5–8)
PLATELET # BLD AUTO: 101 K/UL (ref 150–450)
PLATELET # BLD AUTO: 103 K/UL (ref 150–450)
PLATELET # BLD AUTO: 105 K/UL (ref 150–450)
PLATELET # BLD AUTO: 117 K/UL (ref 150–450)
PLATELET # BLD AUTO: 137 K/UL (ref 150–450)
PLATELET # BLD AUTO: 137 K/UL (ref 150–450)
PLATELET # BLD AUTO: 163 K/UL (ref 150–450)
PLATELET # BLD AUTO: 179 K/UL (ref 150–450)
PLATELET # BLD AUTO: 195 K/UL (ref 150–450)
PLATELET # BLD AUTO: 233 K/UL (ref 150–450)
PLATELET # BLD AUTO: 253 K/UL (ref 150–450)
PLATELET # BLD AUTO: 73 K/UL (ref 150–450)
PLATELET # BLD AUTO: 78 K/UL (ref 150–450)
PLATELET # BLD AUTO: 85 K/UL (ref 150–450)
PLATELET # BLD AUTO: 96 K/UL (ref 150–450)
PLATELET BLD QL SMEAR: ABNORMAL
PLATELET BLD QL SMEAR: ABNORMAL
PMV BLD AUTO: 10.1 FL (ref 9.2–12.9)
PMV BLD AUTO: 10.2 FL (ref 9.2–12.9)
PMV BLD AUTO: 10.4 FL (ref 9.2–12.9)
PMV BLD AUTO: 10.4 FL (ref 9.2–12.9)
PMV BLD AUTO: 10.5 FL (ref 9.2–12.9)
PMV BLD AUTO: 10.6 FL (ref 9.2–12.9)
PMV BLD AUTO: 10.7 FL (ref 9.2–12.9)
PMV BLD AUTO: 11 FL (ref 9.2–12.9)
PMV BLD AUTO: 8.9 FL (ref 9.2–12.9)
PMV BLD AUTO: 9.3 FL (ref 9.2–12.9)
PMV BLD AUTO: 9.3 FL (ref 9.2–12.9)
PMV BLD AUTO: 9.4 FL (ref 9.2–12.9)
PMV BLD AUTO: 9.8 FL (ref 9.2–12.9)
PO2 BLDA: 69 MMHG (ref 80–100)
POC BE: 11 MMOL/L
POC SATURATED O2: 94 % (ref 95–100)
POC TCO2: 36 MMOL/L (ref 23–27)
POCT GLUCOSE: 126 MG/DL (ref 70–110)
POCT GLUCOSE: 147 MG/DL (ref 70–110)
POCT GLUCOSE: 155 MG/DL (ref 70–110)
POCT GLUCOSE: 181 MG/DL (ref 70–110)
POCT GLUCOSE: 208 MG/DL (ref 70–110)
POCT GLUCOSE: 213 MG/DL (ref 70–110)
POCT GLUCOSE: 220 MG/DL (ref 70–110)
POCT GLUCOSE: 228 MG/DL (ref 70–110)
POCT GLUCOSE: 232 MG/DL (ref 70–110)
POCT GLUCOSE: 254 MG/DL (ref 70–110)
POCT GLUCOSE: 259 MG/DL (ref 70–110)
POCT GLUCOSE: 259 MG/DL (ref 70–110)
POCT GLUCOSE: 260 MG/DL (ref 70–110)
POCT GLUCOSE: 262 MG/DL (ref 70–110)
POCT GLUCOSE: 262 MG/DL (ref 70–110)
POCT GLUCOSE: 263 MG/DL (ref 70–110)
POCT GLUCOSE: 266 MG/DL (ref 70–110)
POCT GLUCOSE: 266 MG/DL (ref 70–110)
POCT GLUCOSE: 267 MG/DL (ref 70–110)
POCT GLUCOSE: 269 MG/DL (ref 70–110)
POCT GLUCOSE: 270 MG/DL (ref 70–110)
POCT GLUCOSE: 272 MG/DL (ref 70–110)
POCT GLUCOSE: 277 MG/DL (ref 70–110)
POCT GLUCOSE: 281 MG/DL (ref 70–110)
POCT GLUCOSE: 281 MG/DL (ref 70–110)
POCT GLUCOSE: 286 MG/DL (ref 70–110)
POCT GLUCOSE: 286 MG/DL (ref 70–110)
POCT GLUCOSE: 287 MG/DL (ref 70–110)
POCT GLUCOSE: 288 MG/DL (ref 70–110)
POCT GLUCOSE: 293 MG/DL (ref 70–110)
POCT GLUCOSE: 298 MG/DL (ref 70–110)
POCT GLUCOSE: 306 MG/DL (ref 70–110)
POCT GLUCOSE: 306 MG/DL (ref 70–110)
POCT GLUCOSE: 309 MG/DL (ref 70–110)
POCT GLUCOSE: 311 MG/DL (ref 70–110)
POCT GLUCOSE: 311 MG/DL (ref 70–110)
POCT GLUCOSE: 316 MG/DL (ref 70–110)
POCT GLUCOSE: 320 MG/DL (ref 70–110)
POCT GLUCOSE: 323 MG/DL (ref 70–110)
POCT GLUCOSE: 325 MG/DL (ref 70–110)
POCT GLUCOSE: 328 MG/DL (ref 70–110)
POCT GLUCOSE: 330 MG/DL (ref 70–110)
POCT GLUCOSE: 338 MG/DL (ref 70–110)
POCT GLUCOSE: 339 MG/DL (ref 70–110)
POCT GLUCOSE: 341 MG/DL (ref 70–110)
POCT GLUCOSE: 342 MG/DL (ref 70–110)
POCT GLUCOSE: 345 MG/DL (ref 70–110)
POCT GLUCOSE: 360 MG/DL (ref 70–110)
POCT GLUCOSE: 363 MG/DL (ref 70–110)
POCT GLUCOSE: 369 MG/DL (ref 70–110)
POCT GLUCOSE: 371 MG/DL (ref 70–110)
POCT GLUCOSE: 372 MG/DL (ref 70–110)
POCT GLUCOSE: 435 MG/DL (ref 70–110)
POCT GLUCOSE: 456 MG/DL (ref 70–110)
POTASSIUM SERPL-SCNC: 3.4 MMOL/L (ref 3.5–5.1)
POTASSIUM SERPL-SCNC: 4.2 MMOL/L (ref 3.5–5.1)
POTASSIUM SERPL-SCNC: 4.3 MMOL/L (ref 3.5–5.1)
POTASSIUM SERPL-SCNC: 4.3 MMOL/L (ref 3.5–5.1)
POTASSIUM SERPL-SCNC: 4.4 MMOL/L (ref 3.5–5.1)
POTASSIUM SERPL-SCNC: 4.4 MMOL/L (ref 3.5–5.1)
POTASSIUM SERPL-SCNC: 4.6 MMOL/L (ref 3.5–5.1)
POTASSIUM SERPL-SCNC: 4.7 MMOL/L (ref 3.5–5.1)
POTASSIUM SERPL-SCNC: 4.7 MMOL/L (ref 3.5–5.1)
POTASSIUM SERPL-SCNC: 4.8 MMOL/L (ref 3.5–5.1)
POTASSIUM SERPL-SCNC: 4.9 MMOL/L (ref 3.5–5.1)
POTASSIUM SERPL-SCNC: 5 MMOL/L (ref 3.5–5.1)
POTASSIUM SERPL-SCNC: 5 MMOL/L (ref 3.5–5.1)
POTASSIUM SERPL-SCNC: 5.2 MMOL/L (ref 3.5–5.1)
POTASSIUM SERPL-SCNC: 5.2 MMOL/L (ref 3.5–5.1)
POTASSIUM SERPL-SCNC: 5.3 MMOL/L (ref 3.5–5.1)
PROCALCITONIN SERPL IA-MCNC: 2.95 NG/ML (ref 0–0.5)
PROT SERPL-MCNC: 4.5 G/DL (ref 6–8.4)
PROT SERPL-MCNC: 4.6 G/DL (ref 6–8.4)
PROT SERPL-MCNC: 4.9 G/DL (ref 6–8.4)
PROT SERPL-MCNC: 4.9 G/DL (ref 6–8.4)
PROT SERPL-MCNC: 5 G/DL (ref 6–8.4)
PROT SERPL-MCNC: 5.1 G/DL (ref 6–8.4)
PROT SERPL-MCNC: 5.3 G/DL (ref 6–8.4)
PROT SERPL-MCNC: 5.4 G/DL (ref 6–8.4)
PROT SERPL-MCNC: 5.5 G/DL (ref 6–8.4)
PROT SERPL-MCNC: 5.6 G/DL (ref 6–8.4)
PROT SERPL-MCNC: 5.7 G/DL (ref 6–8.4)
PROT SERPL-MCNC: 6 G/DL (ref 6–8.4)
PROT SERPL-MCNC: 6.4 G/DL (ref 6–8.4)
PROT SERPL-MCNC: 6.7 G/DL (ref 6–8.4)
PROT SERPL-MCNC: 6.7 G/DL (ref 6–8.4)
PROT UR QL STRIP: ABNORMAL
RBC # BLD AUTO: 2.3 M/UL (ref 4–5.4)
RBC # BLD AUTO: 2.32 M/UL (ref 4–5.4)
RBC # BLD AUTO: 2.35 M/UL (ref 4–5.4)
RBC # BLD AUTO: 2.37 M/UL (ref 4–5.4)
RBC # BLD AUTO: 2.4 M/UL (ref 4–5.4)
RBC # BLD AUTO: 2.43 M/UL (ref 4–5.4)
RBC # BLD AUTO: 2.55 M/UL (ref 4–5.4)
RBC # BLD AUTO: 2.6 M/UL (ref 4–5.4)
RBC # BLD AUTO: 2.62 M/UL (ref 4–5.4)
RBC # BLD AUTO: 2.65 M/UL (ref 4–5.4)
RBC # BLD AUTO: 2.69 M/UL (ref 4–5.4)
RBC # BLD AUTO: 2.76 M/UL (ref 4–5.4)
RBC # BLD AUTO: 2.77 M/UL (ref 4–5.4)
RBC # BLD AUTO: 2.82 M/UL (ref 4–5.4)
RBC # BLD AUTO: 3 M/UL (ref 4–5.4)
RBC #/AREA URNS HPF: 3 /HPF (ref 0–4)
RESPIRATORY INFECTION PANEL SOURCE: NORMAL
RSV RNA NPH QL NAA+NON-PROBE: NOT DETECTED
RV+EV RNA NPH QL NAA+NON-PROBE: NOT DETECTED
SAMPLE: ABNORMAL
SAMPLE: NORMAL
SARS-COV-2 RNA RESP QL NAA+PROBE: NOT DETECTED
SITE: ABNORMAL
SODIUM SERPL-SCNC: 132 MMOL/L (ref 136–145)
SODIUM SERPL-SCNC: 133 MMOL/L (ref 136–145)
SODIUM SERPL-SCNC: 135 MMOL/L (ref 136–145)
SODIUM SERPL-SCNC: 135 MMOL/L (ref 136–145)
SODIUM SERPL-SCNC: 136 MMOL/L (ref 136–145)
SODIUM SERPL-SCNC: 137 MMOL/L (ref 136–145)
SODIUM SERPL-SCNC: 137 MMOL/L (ref 136–145)
SODIUM SERPL-SCNC: 138 MMOL/L (ref 136–145)
SP GR UR STRIP: >1.03 (ref 1–1.03)
SPECIMEN OUTDATE: NORMAL
SQUAMOUS #/AREA URNS HPF: 3 /HPF
TROPONIN I SERPL HS-MCNC: 46.9 PG/ML (ref 0–14.9)
URN SPEC COLLECT METH UR: ABNORMAL
UROBILINOGEN UR STRIP-ACNC: NEGATIVE EU/DL
VANCOMYCIN SERPL-MCNC: 6 UG/ML
WBC # BLD AUTO: 10.84 K/UL (ref 3.9–12.7)
WBC # BLD AUTO: 12.28 K/UL (ref 3.9–12.7)
WBC # BLD AUTO: 12.3 K/UL (ref 3.9–12.7)
WBC # BLD AUTO: 15.42 K/UL (ref 3.9–12.7)
WBC # BLD AUTO: 16.89 K/UL (ref 3.9–12.7)
WBC # BLD AUTO: 16.97 K/UL (ref 3.9–12.7)
WBC # BLD AUTO: 16.97 K/UL (ref 3.9–12.7)
WBC # BLD AUTO: 2.92 K/UL (ref 3.9–12.7)
WBC # BLD AUTO: 3.76 K/UL (ref 3.9–12.7)
WBC # BLD AUTO: 3.85 K/UL (ref 3.9–12.7)
WBC # BLD AUTO: 4.7 K/UL (ref 3.9–12.7)
WBC # BLD AUTO: 5.34 K/UL (ref 3.9–12.7)
WBC # BLD AUTO: 7.43 K/UL (ref 3.9–12.7)
WBC # BLD AUTO: 8.39 K/UL (ref 3.9–12.7)
WBC # BLD AUTO: 8.79 K/UL (ref 3.9–12.7)
WBC #/AREA URNS HPF: 1 /HPF (ref 0–5)

## 2024-01-01 PROCEDURE — 99900031 HC PATIENT EDUCATION (STAT)

## 2024-01-01 PROCEDURE — 99233 SBSQ HOSP IP/OBS HIGH 50: CPT | Mod: ,,, | Performed by: INTERNAL MEDICINE

## 2024-01-01 PROCEDURE — 25000003 PHARM REV CODE 250: Performed by: NURSE PRACTITIONER

## 2024-01-01 PROCEDURE — 94799 UNLISTED PULMONARY SVC/PX: CPT

## 2024-01-01 PROCEDURE — 21400001 HC TELEMETRY ROOM

## 2024-01-01 PROCEDURE — 85025 COMPLETE CBC W/AUTO DIFF WBC: CPT | Performed by: HOSPITALIST

## 2024-01-01 PROCEDURE — 27100171 HC OXYGEN HIGH FLOW UP TO 24 HOURS

## 2024-01-01 PROCEDURE — 94761 N-INVAS EAR/PLS OXIMETRY MLT: CPT

## 2024-01-01 PROCEDURE — 99233 SBSQ HOSP IP/OBS HIGH 50: CPT | Mod: GW,,, | Performed by: INTERNAL MEDICINE

## 2024-01-01 PROCEDURE — 93005 ELECTROCARDIOGRAM TRACING: CPT | Performed by: INTERNAL MEDICINE

## 2024-01-01 PROCEDURE — 97530 THERAPEUTIC ACTIVITIES: CPT | Mod: CQ

## 2024-01-01 PROCEDURE — 30233N1 TRANSFUSION OF NONAUTOLOGOUS RED BLOOD CELLS INTO PERIPHERAL VEIN, PERCUTANEOUS APPROACH: ICD-10-PCS | Performed by: NURSE PRACTITIONER

## 2024-01-01 PROCEDURE — 83735 ASSAY OF MAGNESIUM: CPT | Performed by: HOSPITALIST

## 2024-01-01 PROCEDURE — 63600175 PHARM REV CODE 636 W HCPCS: Performed by: NURSE PRACTITIONER

## 2024-01-01 PROCEDURE — 25000242 PHARM REV CODE 250 ALT 637 W/ HCPCS: Performed by: STUDENT IN AN ORGANIZED HEALTH CARE EDUCATION/TRAINING PROGRAM

## 2024-01-01 PROCEDURE — 99233 SBSQ HOSP IP/OBS HIGH 50: CPT | Mod: GW,,, | Performed by: NURSE PRACTITIONER

## 2024-01-01 PROCEDURE — 36415 COLL VENOUS BLD VENIPUNCTURE: CPT | Performed by: HOSPITALIST

## 2024-01-01 PROCEDURE — 94640 AIRWAY INHALATION TREATMENT: CPT

## 2024-01-01 PROCEDURE — 87581 M.PNEUMON DNA AMP PROBE: CPT | Performed by: INTERNAL MEDICINE

## 2024-01-01 PROCEDURE — 36430 TRANSFUSION BLD/BLD COMPNT: CPT

## 2024-01-01 PROCEDURE — 99232 SBSQ HOSP IP/OBS MODERATE 35: CPT | Mod: ,,, | Performed by: INTERNAL MEDICINE

## 2024-01-01 PROCEDURE — 63600175 PHARM REV CODE 636 W HCPCS: Performed by: EMERGENCY MEDICINE

## 2024-01-01 PROCEDURE — 97165 OT EVAL LOW COMPLEX 30 MIN: CPT

## 2024-01-01 PROCEDURE — 85007 BL SMEAR W/DIFF WBC COUNT: CPT | Performed by: HOSPITALIST

## 2024-01-01 PROCEDURE — 85025 COMPLETE CBC W/AUTO DIFF WBC: CPT | Performed by: EMERGENCY MEDICINE

## 2024-01-01 PROCEDURE — 25000003 PHARM REV CODE 250: Performed by: INTERNAL MEDICINE

## 2024-01-01 PROCEDURE — 25000003 PHARM REV CODE 250: Performed by: HOSPITALIST

## 2024-01-01 PROCEDURE — 27000221 HC OXYGEN, UP TO 24 HOURS

## 2024-01-01 PROCEDURE — 97535 SELF CARE MNGMENT TRAINING: CPT

## 2024-01-01 PROCEDURE — 36415 COLL VENOUS BLD VENIPUNCTURE: CPT

## 2024-01-01 PROCEDURE — 63600175 PHARM REV CODE 636 W HCPCS: Performed by: HOSPITALIST

## 2024-01-01 PROCEDURE — 63600175 PHARM REV CODE 636 W HCPCS

## 2024-01-01 PROCEDURE — 99221 1ST HOSP IP/OBS SF/LOW 40: CPT | Mod: ,,, | Performed by: FAMILY MEDICINE

## 2024-01-01 PROCEDURE — 80053 COMPREHEN METABOLIC PANEL: CPT | Performed by: EMERGENCY MEDICINE

## 2024-01-01 PROCEDURE — 63600175 PHARM REV CODE 636 W HCPCS: Performed by: INTERNAL MEDICINE

## 2024-01-01 PROCEDURE — 99223 1ST HOSP IP/OBS HIGH 75: CPT | Mod: GW,,, | Performed by: NURSE PRACTITIONER

## 2024-01-01 PROCEDURE — 87641 MR-STAPH DNA AMP PROBE: CPT | Performed by: INTERNAL MEDICINE

## 2024-01-01 PROCEDURE — 5A09357 ASSISTANCE WITH RESPIRATORY VENTILATION, LESS THAN 24 CONSECUTIVE HOURS, CONTINUOUS POSITIVE AIRWAY PRESSURE: ICD-10-PCS | Performed by: STUDENT IN AN ORGANIZED HEALTH CARE EDUCATION/TRAINING PROGRAM

## 2024-01-01 PROCEDURE — 97530 THERAPEUTIC ACTIVITIES: CPT

## 2024-01-01 PROCEDURE — 84484 ASSAY OF TROPONIN QUANT: CPT | Performed by: EMERGENCY MEDICINE

## 2024-01-01 PROCEDURE — 94660 CPAP INITIATION&MGMT: CPT

## 2024-01-01 PROCEDURE — 99900035 HC TECH TIME PER 15 MIN (STAT)

## 2024-01-01 PROCEDURE — 5A0935A ASSISTANCE WITH RESPIRATORY VENTILATION, LESS THAN 24 CONSECUTIVE HOURS, HIGH NASAL FLOW/VELOCITY: ICD-10-PCS | Performed by: HOSPITALIST

## 2024-01-01 PROCEDURE — 97535 SELF CARE MNGMENT TRAINING: CPT | Mod: CO

## 2024-01-01 PROCEDURE — 51798 US URINE CAPACITY MEASURE: CPT

## 2024-01-01 PROCEDURE — 25000003 PHARM REV CODE 250

## 2024-01-01 PROCEDURE — 63600175 PHARM REV CODE 636 W HCPCS: Performed by: STUDENT IN AN ORGANIZED HEALTH CARE EDUCATION/TRAINING PROGRAM

## 2024-01-01 PROCEDURE — 51702 INSERT TEMP BLADDER CATH: CPT

## 2024-01-01 PROCEDURE — 99285 EMERGENCY DEPT VISIT HI MDM: CPT | Mod: 25

## 2024-01-01 PROCEDURE — 36415 COLL VENOUS BLD VENIPUNCTURE: CPT | Performed by: EMERGENCY MEDICINE

## 2024-01-01 PROCEDURE — 99223 1ST HOSP IP/OBS HIGH 75: CPT | Mod: ,,, | Performed by: STUDENT IN AN ORGANIZED HEALTH CARE EDUCATION/TRAINING PROGRAM

## 2024-01-01 PROCEDURE — 86920 COMPATIBILITY TEST SPIN: CPT | Performed by: NURSE PRACTITIONER

## 2024-01-01 PROCEDURE — 80053 COMPREHEN METABOLIC PANEL: CPT | Performed by: HOSPITALIST

## 2024-01-01 PROCEDURE — P9016 RBC LEUKOCYTES REDUCED: HCPCS | Performed by: NURSE PRACTITIONER

## 2024-01-01 PROCEDURE — 94799 UNLISTED PULMONARY SVC/PX: CPT | Mod: XB

## 2024-01-01 PROCEDURE — 83615 LACTATE (LD) (LDH) ENZYME: CPT | Performed by: INTERNAL MEDICINE

## 2024-01-01 PROCEDURE — 86901 BLOOD TYPING SEROLOGIC RH(D): CPT | Performed by: NURSE PRACTITIONER

## 2024-01-01 PROCEDURE — 51701 INSERT BLADDER CATHETER: CPT

## 2024-01-01 PROCEDURE — 36415 COLL VENOUS BLD VENIPUNCTURE: CPT | Performed by: NURSE PRACTITIONER

## 2024-01-01 PROCEDURE — 99231 SBSQ HOSP IP/OBS SF/LOW 25: CPT | Mod: ,,, | Performed by: INTERNAL MEDICINE

## 2024-01-01 PROCEDURE — 99233 SBSQ HOSP IP/OBS HIGH 50: CPT | Mod: ,,, | Performed by: STUDENT IN AN ORGANIZED HEALTH CARE EDUCATION/TRAINING PROGRAM

## 2024-01-01 PROCEDURE — 86140 C-REACTIVE PROTEIN: CPT | Performed by: INTERNAL MEDICINE

## 2024-01-01 PROCEDURE — 82962 GLUCOSE BLOOD TEST: CPT

## 2024-01-01 PROCEDURE — 83880 ASSAY OF NATRIURETIC PEPTIDE: CPT | Performed by: EMERGENCY MEDICINE

## 2024-01-01 PROCEDURE — 99223 1ST HOSP IP/OBS HIGH 75: CPT | Mod: ,,, | Performed by: INTERNAL MEDICINE

## 2024-01-01 PROCEDURE — 87040 BLOOD CULTURE FOR BACTERIA: CPT | Performed by: EMERGENCY MEDICINE

## 2024-01-01 PROCEDURE — 83735 ASSAY OF MAGNESIUM: CPT | Performed by: EMERGENCY MEDICINE

## 2024-01-01 PROCEDURE — 36415 COLL VENOUS BLD VENIPUNCTURE: CPT | Performed by: INTERNAL MEDICINE

## 2024-01-01 PROCEDURE — 97162 PT EVAL MOD COMPLEX 30 MIN: CPT

## 2024-01-01 PROCEDURE — 94760 N-INVAS EAR/PLS OXIMETRY 1: CPT

## 2024-01-01 PROCEDURE — 83605 ASSAY OF LACTIC ACID: CPT | Performed by: EMERGENCY MEDICINE

## 2024-01-01 PROCEDURE — 25000003 PHARM REV CODE 250: Performed by: STUDENT IN AN ORGANIZED HEALTH CARE EDUCATION/TRAINING PROGRAM

## 2024-01-01 PROCEDURE — 99497 ADVNCD CARE PLAN 30 MIN: CPT | Mod: 25,GW,, | Performed by: NURSE PRACTITIONER

## 2024-01-01 PROCEDURE — 80048 BASIC METABOLIC PNL TOTAL CA: CPT

## 2024-01-01 PROCEDURE — 81001 URINALYSIS AUTO W/SCOPE: CPT | Performed by: EMERGENCY MEDICINE

## 2024-01-01 PROCEDURE — 85027 COMPLETE CBC AUTOMATED: CPT | Performed by: HOSPITALIST

## 2024-01-01 PROCEDURE — 36600 WITHDRAWAL OF ARTERIAL BLOOD: CPT

## 2024-01-01 PROCEDURE — 97530 THERAPEUTIC ACTIVITIES: CPT | Mod: CO

## 2024-01-01 PROCEDURE — 94761 N-INVAS EAR/PLS OXIMETRY MLT: CPT | Mod: XB

## 2024-01-01 PROCEDURE — 93010 ELECTROCARDIOGRAM REPORT: CPT | Mod: ,,, | Performed by: INTERNAL MEDICINE

## 2024-01-01 PROCEDURE — 25500020 PHARM REV CODE 255: Performed by: HOSPITALIST

## 2024-01-01 PROCEDURE — 80202 ASSAY OF VANCOMYCIN: CPT | Performed by: HOSPITALIST

## 2024-01-01 PROCEDURE — 82803 BLOOD GASES ANY COMBINATION: CPT

## 2024-01-01 PROCEDURE — 84145 PROCALCITONIN (PCT): CPT | Performed by: INTERNAL MEDICINE

## 2024-01-01 RX ORDER — ONDANSETRON HYDROCHLORIDE 2 MG/ML
4 INJECTION, SOLUTION INTRAVENOUS EVERY 6 HOURS PRN
Status: CANCELLED | OUTPATIENT
Start: 2024-01-01

## 2024-01-01 RX ORDER — SODIUM CHLORIDE 9 MG/ML
INJECTION, SOLUTION INTRAVENOUS CONTINUOUS
Status: DISCONTINUED | OUTPATIENT
Start: 2024-01-01 | End: 2024-01-01

## 2024-01-01 RX ORDER — DIPHENHYDRAMINE HCL 25 MG
25 CAPSULE ORAL ONCE
Status: COMPLETED | OUTPATIENT
Start: 2024-01-01 | End: 2024-01-01

## 2024-01-01 RX ORDER — LANOLIN ALCOHOL/MO/W.PET/CERES
800 CREAM (GRAM) TOPICAL
Status: DISCONTINUED | OUTPATIENT
Start: 2024-01-01 | End: 2024-01-01

## 2024-01-01 RX ORDER — GLUCAGON 1 MG
1 KIT INJECTION
Status: DISCONTINUED | OUTPATIENT
Start: 2024-01-01 | End: 2024-01-01

## 2024-01-01 RX ORDER — MORPHINE SULFATE 2 MG/ML
2 INJECTION, SOLUTION INTRAMUSCULAR; INTRAVENOUS
Status: CANCELLED | OUTPATIENT
Start: 2024-01-01

## 2024-01-01 RX ORDER — LORAZEPAM 0.5 MG/1
0.5 TABLET ORAL EVERY 4 HOURS PRN
Status: DISCONTINUED | OUTPATIENT
Start: 2024-01-01 | End: 2024-01-01 | Stop reason: HOSPADM

## 2024-01-01 RX ORDER — METOPROLOL SUCCINATE 25 MG/1
25 TABLET, EXTENDED RELEASE ORAL DAILY
Status: DISCONTINUED | OUTPATIENT
Start: 2024-01-01 | End: 2024-01-01

## 2024-01-01 RX ORDER — SODIUM,POTASSIUM PHOSPHATES 280-250MG
2 POWDER IN PACKET (EA) ORAL
Status: DISCONTINUED | OUTPATIENT
Start: 2024-01-01 | End: 2024-01-01

## 2024-01-01 RX ORDER — LORAZEPAM 0.5 MG/1
0.5 TABLET ORAL ONCE
Status: COMPLETED | OUTPATIENT
Start: 2024-01-01 | End: 2024-01-01

## 2024-01-01 RX ORDER — PREDNISONE 20 MG/1
20 TABLET ORAL ONCE
Status: COMPLETED | OUTPATIENT
Start: 2024-01-01 | End: 2024-01-01

## 2024-01-01 RX ORDER — CALCIUM GLUCONATE 20 MG/ML
1 INJECTION, SOLUTION INTRAVENOUS ONCE
Status: COMPLETED | OUTPATIENT
Start: 2024-01-01 | End: 2024-01-01

## 2024-01-01 RX ORDER — INSULIN ASPART 100 [IU]/ML
5 INJECTION, SOLUTION INTRAVENOUS; SUBCUTANEOUS
Status: DISCONTINUED | OUTPATIENT
Start: 2024-01-01 | End: 2024-01-01

## 2024-01-01 RX ORDER — SULFAMETHOXAZOLE AND TRIMETHOPRIM 800; 160 MG/1; MG/1
1 TABLET ORAL EVERY OTHER DAY
Status: DISCONTINUED | OUTPATIENT
Start: 2024-01-01 | End: 2024-01-01

## 2024-01-01 RX ORDER — ONDANSETRON HYDROCHLORIDE 2 MG/ML
4 INJECTION, SOLUTION INTRAVENOUS EVERY 6 HOURS PRN
Status: DISCONTINUED | OUTPATIENT
Start: 2024-01-01 | End: 2024-01-01 | Stop reason: HOSPADM

## 2024-01-01 RX ORDER — MORPHINE SULFATE 2 MG/ML
5 INJECTION, SOLUTION INTRAMUSCULAR; INTRAVENOUS
Status: DISCONTINUED | OUTPATIENT
Start: 2024-01-01 | End: 2024-01-01 | Stop reason: HOSPADM

## 2024-01-01 RX ORDER — LORAZEPAM 2 MG/ML
1 INJECTION INTRAMUSCULAR
Status: DISCONTINUED | OUTPATIENT
Start: 2024-01-01 | End: 2024-01-01

## 2024-01-01 RX ORDER — LORAZEPAM 0.5 MG/1
0.5 TABLET ORAL EVERY 4 HOURS PRN
Status: CANCELLED | OUTPATIENT
Start: 2024-01-01

## 2024-01-01 RX ORDER — PREDNISONE 20 MG/1
40 TABLET ORAL DAILY
Status: DISCONTINUED | OUTPATIENT
Start: 2024-01-01 | End: 2024-01-01

## 2024-01-01 RX ORDER — ACETAMINOPHEN 325 MG/1
650 TABLET ORAL EVERY 8 HOURS PRN
Status: DISCONTINUED | OUTPATIENT
Start: 2024-01-01 | End: 2024-01-01

## 2024-01-01 RX ORDER — ACETAMINOPHEN 325 MG/1
650 TABLET ORAL EVERY 4 HOURS PRN
Status: CANCELLED | OUTPATIENT
Start: 2024-01-01

## 2024-01-01 RX ORDER — METHYLPREDNISOLONE SOD SUCC 125 MG
60 VIAL (EA) INJECTION EVERY 6 HOURS
Status: DISCONTINUED | OUTPATIENT
Start: 2024-01-01 | End: 2024-01-01

## 2024-01-01 RX ORDER — INSULIN ASPART 100 [IU]/ML
0-10 INJECTION, SOLUTION INTRAVENOUS; SUBCUTANEOUS
Status: DISCONTINUED | OUTPATIENT
Start: 2024-01-01 | End: 2024-01-01 | Stop reason: HOSPADM

## 2024-01-01 RX ORDER — PANTOPRAZOLE SODIUM 40 MG/1
40 TABLET, DELAYED RELEASE ORAL DAILY
Status: DISCONTINUED | OUTPATIENT
Start: 2024-01-01 | End: 2024-01-01

## 2024-01-01 RX ORDER — LORAZEPAM 2 MG/ML
1 INJECTION INTRAMUSCULAR
Status: DISCONTINUED | OUTPATIENT
Start: 2024-01-01 | End: 2024-01-01 | Stop reason: HOSPADM

## 2024-01-01 RX ORDER — INSULIN ASPART 100 [IU]/ML
0-10 INJECTION, SOLUTION INTRAVENOUS; SUBCUTANEOUS
Status: CANCELLED | OUTPATIENT
Start: 2024-01-01

## 2024-01-01 RX ORDER — FUROSEMIDE 10 MG/ML
20 INJECTION INTRAMUSCULAR; INTRAVENOUS ONCE
Status: COMPLETED | OUTPATIENT
Start: 2024-01-01 | End: 2024-01-01

## 2024-01-01 RX ORDER — IPRATROPIUM BROMIDE AND ALBUTEROL SULFATE 2.5; .5 MG/3ML; MG/3ML
3 SOLUTION RESPIRATORY (INHALATION)
Status: DISCONTINUED | OUTPATIENT
Start: 2024-01-01 | End: 2024-01-01 | Stop reason: HOSPADM

## 2024-01-01 RX ORDER — GABAPENTIN 100 MG/1
100 CAPSULE ORAL 2 TIMES DAILY
Status: DISCONTINUED | OUTPATIENT
Start: 2024-01-01 | End: 2024-01-01

## 2024-01-01 RX ORDER — DIAZEPAM 10 MG/2ML
7.5 INJECTION INTRAMUSCULAR ONCE
Status: COMPLETED | OUTPATIENT
Start: 2024-01-01 | End: 2024-01-01

## 2024-01-01 RX ORDER — TALC
6 POWDER (GRAM) TOPICAL NIGHTLY PRN
Status: DISCONTINUED | OUTPATIENT
Start: 2024-01-01 | End: 2024-01-01 | Stop reason: HOSPADM

## 2024-01-01 RX ORDER — ALUMINUM HYDROXIDE, MAGNESIUM HYDROXIDE, AND SIMETHICONE 1200; 120; 1200 MG/30ML; MG/30ML; MG/30ML
30 SUSPENSION ORAL 4 TIMES DAILY PRN
Status: DISCONTINUED | OUTPATIENT
Start: 2024-01-01 | End: 2024-01-01

## 2024-01-01 RX ORDER — FOLIC ACID 1 MG/1
1 TABLET ORAL DAILY
Status: DISCONTINUED | OUTPATIENT
Start: 2024-01-01 | End: 2024-01-01

## 2024-01-01 RX ORDER — IPRATROPIUM BROMIDE AND ALBUTEROL SULFATE 2.5; .5 MG/3ML; MG/3ML
3 SOLUTION RESPIRATORY (INHALATION)
Status: CANCELLED | OUTPATIENT
Start: 2024-01-01

## 2024-01-01 RX ORDER — IBUPROFEN 200 MG
24 TABLET ORAL
Status: DISCONTINUED | OUTPATIENT
Start: 2024-01-01 | End: 2024-01-01

## 2024-01-01 RX ORDER — AMIODARONE HYDROCHLORIDE 200 MG/1
200 TABLET ORAL 2 TIMES DAILY
Status: DISCONTINUED | OUTPATIENT
Start: 2024-01-01 | End: 2024-01-01

## 2024-01-01 RX ORDER — DOXYCYCLINE 100 MG/1
100 CAPSULE ORAL EVERY 12 HOURS
Status: DISCONTINUED | OUTPATIENT
Start: 2024-01-01 | End: 2024-01-01

## 2024-01-01 RX ORDER — LACTULOSE 10 G/15ML
20 SOLUTION ORAL EVERY 6 HOURS PRN
Status: DISCONTINUED | OUTPATIENT
Start: 2024-01-01 | End: 2024-01-01 | Stop reason: HOSPADM

## 2024-01-01 RX ORDER — MORPHINE SULFATE 2 MG/ML
2 INJECTION, SOLUTION INTRAMUSCULAR; INTRAVENOUS EVERY 4 HOURS PRN
Status: DISCONTINUED | OUTPATIENT
Start: 2024-01-01 | End: 2024-01-01

## 2024-01-01 RX ORDER — AMOXICILLIN 250 MG
2 CAPSULE ORAL 2 TIMES DAILY PRN
Status: DISCONTINUED | OUTPATIENT
Start: 2024-01-01 | End: 2024-01-01

## 2024-01-01 RX ORDER — POLYETHYLENE GLYCOL 3350 17 G/17G
17 POWDER, FOR SOLUTION ORAL DAILY
Status: DISCONTINUED | OUTPATIENT
Start: 2024-01-01 | End: 2024-01-01

## 2024-01-01 RX ORDER — SUCRALFATE 1 G/10ML
1 SUSPENSION ORAL 4 TIMES DAILY
Status: DISCONTINUED | OUTPATIENT
Start: 2024-01-01 | End: 2024-01-01

## 2024-01-01 RX ORDER — INSULIN ASPART 100 [IU]/ML
0-5 INJECTION, SOLUTION INTRAVENOUS; SUBCUTANEOUS
Status: DISCONTINUED | OUTPATIENT
Start: 2024-01-01 | End: 2024-01-01

## 2024-01-01 RX ORDER — SCOLOPAMINE TRANSDERMAL SYSTEM 1 MG/1
1 PATCH, EXTENDED RELEASE TRANSDERMAL
Status: CANCELLED | OUTPATIENT
Start: 2024-11-21

## 2024-01-01 RX ORDER — TAMSULOSIN HYDROCHLORIDE 0.4 MG/1
0.4 CAPSULE ORAL DAILY
Status: DISCONTINUED | OUTPATIENT
Start: 2024-01-01 | End: 2024-01-01

## 2024-01-01 RX ORDER — ACETAMINOPHEN 325 MG/1
650 TABLET ORAL EVERY 4 HOURS PRN
Status: DISCONTINUED | OUTPATIENT
Start: 2024-01-01 | End: 2024-01-01 | Stop reason: HOSPADM

## 2024-01-01 RX ORDER — LORAZEPAM 0.5 MG/1
0.5 TABLET ORAL EVERY 8 HOURS PRN
Status: DISCONTINUED | OUTPATIENT
Start: 2024-01-01 | End: 2024-01-01

## 2024-01-01 RX ORDER — LANOLIN ALCOHOL/MO/W.PET/CERES
400 CREAM (GRAM) TOPICAL DAILY
Status: DISCONTINUED | OUTPATIENT
Start: 2024-01-01 | End: 2024-01-01

## 2024-01-01 RX ORDER — SODIUM CHLORIDE 9 MG/ML
INJECTION, SOLUTION INTRAVENOUS CONTINUOUS
Status: ACTIVE | OUTPATIENT
Start: 2024-01-01 | End: 2024-01-01

## 2024-01-01 RX ORDER — CEFEPIME HYDROCHLORIDE 1 G/1
1 INJECTION, POWDER, FOR SOLUTION INTRAMUSCULAR; INTRAVENOUS
Status: DISCONTINUED | OUTPATIENT
Start: 2024-01-01 | End: 2024-01-01

## 2024-01-01 RX ORDER — LORAZEPAM 2 MG/ML
1 INJECTION INTRAMUSCULAR
Status: CANCELLED | OUTPATIENT
Start: 2024-01-01

## 2024-01-01 RX ORDER — MUPIROCIN 20 MG/G
OINTMENT TOPICAL 2 TIMES DAILY
Status: DISPENSED | OUTPATIENT
Start: 2024-01-01 | End: 2024-01-01

## 2024-01-01 RX ORDER — LORAZEPAM 2 MG/ML
2 INJECTION INTRAMUSCULAR
Status: DISCONTINUED | OUTPATIENT
Start: 2024-01-01 | End: 2024-01-01 | Stop reason: HOSPADM

## 2024-01-01 RX ORDER — MORPHINE SULFATE 2 MG/ML
2 INJECTION, SOLUTION INTRAMUSCULAR; INTRAVENOUS
Status: DISCONTINUED | OUTPATIENT
Start: 2024-01-01 | End: 2024-01-01

## 2024-01-01 RX ORDER — IPRATROPIUM BROMIDE AND ALBUTEROL SULFATE 2.5; .5 MG/3ML; MG/3ML
3 SOLUTION RESPIRATORY (INHALATION)
Status: DISCONTINUED | OUTPATIENT
Start: 2024-01-01 | End: 2024-01-01

## 2024-01-01 RX ORDER — POLYETHYLENE GLYCOL 3350 17 G/17G
17 POWDER, FOR SOLUTION ORAL 2 TIMES DAILY
Status: DISCONTINUED | OUTPATIENT
Start: 2024-01-01 | End: 2024-01-01

## 2024-01-01 RX ORDER — SODIUM CHLORIDE 0.9 % (FLUSH) 0.9 %
2 SYRINGE (ML) INJECTION EVERY 12 HOURS PRN
Status: DISCONTINUED | OUTPATIENT
Start: 2024-01-01 | End: 2024-01-01

## 2024-01-01 RX ORDER — BISACODYL 10 MG/1
10 SUPPOSITORY RECTAL ONCE
Status: COMPLETED | OUTPATIENT
Start: 2024-01-01 | End: 2024-01-01

## 2024-01-01 RX ORDER — MORPHINE SULFATE 2 MG/ML
2 INJECTION, SOLUTION INTRAMUSCULAR; INTRAVENOUS
Status: DISCONTINUED | OUTPATIENT
Start: 2024-01-01 | End: 2024-01-01 | Stop reason: HOSPADM

## 2024-01-01 RX ORDER — CEFEPIME HYDROCHLORIDE 2 G/1
2 INJECTION, POWDER, FOR SOLUTION INTRAVENOUS
Status: COMPLETED | OUTPATIENT
Start: 2024-01-01 | End: 2024-01-01

## 2024-01-01 RX ORDER — HYDROCODONE BITARTRATE AND ACETAMINOPHEN 500; 5 MG/1; MG/1
TABLET ORAL
Status: DISCONTINUED | OUTPATIENT
Start: 2024-01-01 | End: 2024-01-01

## 2024-01-01 RX ORDER — NALOXONE HCL 0.4 MG/ML
0.02 VIAL (ML) INJECTION
Status: DISCONTINUED | OUTPATIENT
Start: 2024-01-01 | End: 2024-01-01

## 2024-01-01 RX ORDER — SCOLOPAMINE TRANSDERMAL SYSTEM 1 MG/1
1 PATCH, EXTENDED RELEASE TRANSDERMAL
Status: DISCONTINUED | OUTPATIENT
Start: 2024-11-21 | End: 2024-01-01 | Stop reason: HOSPADM

## 2024-01-01 RX ORDER — MORPHINE SULFATE 2 MG/ML
1 INJECTION, SOLUTION INTRAMUSCULAR; INTRAVENOUS EVERY 4 HOURS PRN
Status: DISCONTINUED | OUTPATIENT
Start: 2024-01-01 | End: 2024-01-01

## 2024-01-01 RX ORDER — SCOLOPAMINE TRANSDERMAL SYSTEM 1 MG/1
1 PATCH, EXTENDED RELEASE TRANSDERMAL
Status: DISCONTINUED | OUTPATIENT
Start: 2024-01-01 | End: 2024-01-01 | Stop reason: HOSPADM

## 2024-01-01 RX ORDER — LACTULOSE 10 G/15ML
20 SOLUTION ORAL EVERY 6 HOURS PRN
Status: CANCELLED | OUTPATIENT
Start: 2024-01-01

## 2024-01-01 RX ORDER — DOCUSATE SODIUM 100 MG
200 CAPSULE ORAL
Status: DISCONTINUED | OUTPATIENT
Start: 2024-01-01 | End: 2024-01-01

## 2024-01-01 RX ORDER — VANCOMYCIN HCL IN 5 % DEXTROSE 1G/250ML
1000 PLASTIC BAG, INJECTION (ML) INTRAVENOUS ONCE
Status: COMPLETED | OUTPATIENT
Start: 2024-01-01 | End: 2024-01-01

## 2024-01-01 RX ORDER — FUROSEMIDE 10 MG/ML
40 INJECTION INTRAMUSCULAR; INTRAVENOUS ONCE
Status: COMPLETED | OUTPATIENT
Start: 2024-01-01 | End: 2024-01-01

## 2024-01-01 RX ORDER — IBUPROFEN 200 MG
16 TABLET ORAL
Status: DISCONTINUED | OUTPATIENT
Start: 2024-01-01 | End: 2024-01-01

## 2024-01-01 RX ORDER — PREDNISONE 20 MG/1
60 TABLET ORAL DAILY
Status: DISCONTINUED | OUTPATIENT
Start: 2024-01-01 | End: 2024-01-01

## 2024-01-01 RX ORDER — TALC
6 POWDER (GRAM) TOPICAL NIGHTLY PRN
Status: CANCELLED | OUTPATIENT
Start: 2024-01-01

## 2024-01-01 RX ORDER — HYDROCODONE BITARTRATE AND ACETAMINOPHEN 5; 325 MG/1; MG/1
1 TABLET ORAL EVERY 6 HOURS PRN
Status: DISCONTINUED | OUTPATIENT
Start: 2024-01-01 | End: 2024-01-01

## 2024-01-01 RX ADMIN — INSULIN ASPART 4 UNITS: 100 INJECTION, SOLUTION INTRAVENOUS; SUBCUTANEOUS at 09:11

## 2024-01-01 RX ADMIN — LORAZEPAM 0.5 MG: 0.5 TABLET ORAL at 09:11

## 2024-01-01 RX ADMIN — IPRATROPIUM BROMIDE AND ALBUTEROL SULFATE 3 ML: .5; 3 SOLUTION RESPIRATORY (INHALATION) at 07:11

## 2024-01-01 RX ADMIN — LORAZEPAM 0.5 MG: 0.5 TABLET ORAL at 08:11

## 2024-01-01 RX ADMIN — Medication 200 ML: at 12:11

## 2024-01-01 RX ADMIN — SUCRALFATE 1 G: 1 SUSPENSION ORAL at 12:11

## 2024-01-01 RX ADMIN — Medication 400 MG: at 10:11

## 2024-01-01 RX ADMIN — SUCRALFATE 1 G: 1 SUSPENSION ORAL at 05:11

## 2024-01-01 RX ADMIN — AMIODARONE HYDROCHLORIDE 200 MG: 200 TABLET ORAL at 08:11

## 2024-01-01 RX ADMIN — IPRATROPIUM BROMIDE AND ALBUTEROL SULFATE 3 ML: .5; 3 SOLUTION RESPIRATORY (INHALATION) at 01:11

## 2024-01-01 RX ADMIN — PANTOPRAZOLE SODIUM 40 MG: 40 TABLET, DELAYED RELEASE ORAL at 06:11

## 2024-01-01 RX ADMIN — SUCRALFATE 1 G: 1 SUSPENSION ORAL at 01:11

## 2024-01-01 RX ADMIN — Medication 200 ML: at 05:11

## 2024-01-01 RX ADMIN — METHYLPREDNISOLONE SODIUM SUCCINATE 60 MG: 125 INJECTION INTRAMUSCULAR; INTRAVENOUS at 12:11

## 2024-01-01 RX ADMIN — INSULIN ASPART 5 UNITS: 100 INJECTION, SOLUTION INTRAVENOUS; SUBCUTANEOUS at 04:11

## 2024-01-01 RX ADMIN — TAMSULOSIN HYDROCHLORIDE 0.4 MG: 0.4 CAPSULE ORAL at 08:11

## 2024-01-01 RX ADMIN — INSULIN ASPART 5 UNITS: 100 INJECTION, SOLUTION INTRAVENOUS; SUBCUTANEOUS at 06:11

## 2024-01-01 RX ADMIN — PANTOPRAZOLE SODIUM 40 MG: 40 TABLET, DELAYED RELEASE ORAL at 05:11

## 2024-01-01 RX ADMIN — METOPROLOL SUCCINATE 25 MG: 25 TABLET, FILM COATED, EXTENDED RELEASE ORAL at 08:11

## 2024-01-01 RX ADMIN — SUCRALFATE 1 G: 1 SUSPENSION ORAL at 04:11

## 2024-01-01 RX ADMIN — IPRATROPIUM BROMIDE AND ALBUTEROL SULFATE 3 ML: .5; 3 SOLUTION RESPIRATORY (INHALATION) at 08:11

## 2024-01-01 RX ADMIN — Medication 400 MG: at 08:11

## 2024-01-01 RX ADMIN — INSULIN ASPART 5 UNITS: 100 INJECTION, SOLUTION INTRAVENOUS; SUBCUTANEOUS at 08:11

## 2024-01-01 RX ADMIN — METHYLPREDNISOLONE SODIUM SUCCINATE 40 MG: 40 INJECTION, POWDER, FOR SOLUTION INTRAMUSCULAR; INTRAVENOUS at 05:11

## 2024-01-01 RX ADMIN — GABAPENTIN 100 MG: 100 CAPSULE ORAL at 08:11

## 2024-01-01 RX ADMIN — FOLIC ACID 1 MG: 1 TABLET ORAL at 08:11

## 2024-01-01 RX ADMIN — INSULIN ASPART 10 UNITS: 100 INJECTION, SOLUTION INTRAVENOUS; SUBCUTANEOUS at 05:11

## 2024-01-01 RX ADMIN — MORPHINE SULFATE 2 MG: 2 INJECTION, SOLUTION INTRAMUSCULAR; INTRAVENOUS at 11:11

## 2024-01-01 RX ADMIN — PANTOPRAZOLE SODIUM 40 MG: 40 TABLET, DELAYED RELEASE ORAL at 07:11

## 2024-01-01 RX ADMIN — INSULIN ASPART 6 UNITS: 100 INJECTION, SOLUTION INTRAVENOUS; SUBCUTANEOUS at 08:11

## 2024-01-01 RX ADMIN — SULFAMETHOXAZOLE AND TRIMETHOPRIM 1 TABLET: 800; 160 TABLET ORAL at 08:11

## 2024-01-01 RX ADMIN — DOXYCYCLINE HYCLATE 100 MG: 100 CAPSULE ORAL at 08:11

## 2024-01-01 RX ADMIN — GABAPENTIN 100 MG: 100 CAPSULE ORAL at 09:11

## 2024-01-01 RX ADMIN — APIXABAN 2.5 MG: 2.5 TABLET, FILM COATED ORAL at 08:11

## 2024-01-01 RX ADMIN — IPRATROPIUM BROMIDE AND ALBUTEROL SULFATE 3 ML: .5; 3 SOLUTION RESPIRATORY (INHALATION) at 02:11

## 2024-01-01 RX ADMIN — INSULIN ASPART 6 UNITS: 100 INJECTION, SOLUTION INTRAVENOUS; SUBCUTANEOUS at 05:11

## 2024-01-01 RX ADMIN — INSULIN ASPART 2 UNITS: 100 INJECTION, SOLUTION INTRAVENOUS; SUBCUTANEOUS at 09:11

## 2024-01-01 RX ADMIN — MUPIROCIN 1 G: 20 OINTMENT TOPICAL at 09:11

## 2024-01-01 RX ADMIN — PANTOPRAZOLE SODIUM 40 MG: 40 TABLET, DELAYED RELEASE ORAL at 04:11

## 2024-01-01 RX ADMIN — SUCRALFATE 1 G: 1 SUSPENSION ORAL at 08:11

## 2024-01-01 RX ADMIN — TAMSULOSIN HYDROCHLORIDE 0.4 MG: 0.4 CAPSULE ORAL at 05:11

## 2024-01-01 RX ADMIN — POLYETHYLENE GLYCOL 3350 17 G: 17 POWDER, FOR SOLUTION ORAL at 08:11

## 2024-01-01 RX ADMIN — Medication 200 ML: at 08:11

## 2024-01-01 RX ADMIN — Medication 200 ML: at 04:11

## 2024-01-01 RX ADMIN — ONDANSETRON 4 MG: 2 INJECTION INTRAMUSCULAR; INTRAVENOUS at 11:11

## 2024-01-01 RX ADMIN — CEFEPIME 1 G: 1 INJECTION, POWDER, FOR SOLUTION INTRAMUSCULAR; INTRAVENOUS at 12:11

## 2024-01-01 RX ADMIN — Medication 200 ML: at 10:11

## 2024-01-01 RX ADMIN — GABAPENTIN 100 MG: 100 CAPSULE ORAL at 10:11

## 2024-01-01 RX ADMIN — BISACODYL 10 MG: 10 SUPPOSITORY RECTAL at 12:11

## 2024-01-01 RX ADMIN — INSULIN ASPART 4 UNITS: 100 INJECTION, SOLUTION INTRAVENOUS; SUBCUTANEOUS at 08:11

## 2024-01-01 RX ADMIN — INSULIN ASPART 4 UNITS: 100 INJECTION, SOLUTION INTRAVENOUS; SUBCUTANEOUS at 02:11

## 2024-01-01 RX ADMIN — PANTOPRAZOLE SODIUM 40 MG: 40 TABLET, DELAYED RELEASE ORAL at 02:11

## 2024-01-01 RX ADMIN — SODIUM ZIRCONIUM CYCLOSILICATE 10 G: 10 POWDER, FOR SUSPENSION ORAL at 10:11

## 2024-01-01 RX ADMIN — PREDNISONE 40 MG: 20 TABLET ORAL at 10:11

## 2024-01-01 RX ADMIN — TAMSULOSIN HYDROCHLORIDE 0.4 MG: 0.4 CAPSULE ORAL at 10:11

## 2024-01-01 RX ADMIN — SENNOSIDES AND DOCUSATE SODIUM 2 TABLET: 8.6; 5 TABLET ORAL at 05:11

## 2024-01-01 RX ADMIN — INSULIN ASPART 5 UNITS: 100 INJECTION, SOLUTION INTRAVENOUS; SUBCUTANEOUS at 12:11

## 2024-01-01 RX ADMIN — CEFEPIME 1 G: 1 INJECTION, POWDER, FOR SOLUTION INTRAMUSCULAR; INTRAVENOUS at 01:11

## 2024-01-01 RX ADMIN — SUCRALFATE 1 G: 1 SUSPENSION ORAL at 09:11

## 2024-01-01 RX ADMIN — LORAZEPAM 1 MG: 2 INJECTION INTRAMUSCULAR; INTRAVENOUS at 06:11

## 2024-01-01 RX ADMIN — METOPROLOL SUCCINATE 25 MG: 25 TABLET, FILM COATED, EXTENDED RELEASE ORAL at 10:11

## 2024-01-01 RX ADMIN — LORAZEPAM 0.5 MG: 0.5 TABLET ORAL at 10:11

## 2024-01-01 RX ADMIN — METHYLPREDNISOLONE SODIUM SUCCINATE 40 MG: 40 INJECTION, POWDER, FOR SOLUTION INTRAMUSCULAR; INTRAVENOUS at 06:11

## 2024-01-01 RX ADMIN — SUCRALFATE 1 G: 1 SUSPENSION ORAL at 10:11

## 2024-01-01 RX ADMIN — MORPHINE SULFATE 2 MG: 2 INJECTION, SOLUTION INTRAMUSCULAR; INTRAVENOUS at 05:11

## 2024-01-01 RX ADMIN — MUPIROCIN 1 G: 20 OINTMENT TOPICAL at 08:11

## 2024-01-01 RX ADMIN — IPRATROPIUM BROMIDE AND ALBUTEROL SULFATE 3 ML: .5; 3 SOLUTION RESPIRATORY (INHALATION) at 05:11

## 2024-01-01 RX ADMIN — HYDROCODONE BITARTRATE AND ACETAMINOPHEN 1 TABLET: 5; 325 TABLET ORAL at 08:11

## 2024-01-01 RX ADMIN — INSULIN ASPART 5 UNITS: 100 INJECTION, SOLUTION INTRAVENOUS; SUBCUTANEOUS at 11:11

## 2024-01-01 RX ADMIN — DIPHENHYDRAMINE HYDROCHLORIDE 25 MG: 25 CAPSULE ORAL at 02:11

## 2024-01-01 RX ADMIN — METHYLPREDNISOLONE SODIUM SUCCINATE 40 MG: 40 INJECTION, POWDER, FOR SOLUTION INTRAMUSCULAR; INTRAVENOUS at 02:11

## 2024-01-01 RX ADMIN — Medication 400 MG: at 02:11

## 2024-01-01 RX ADMIN — Medication 100 ML: at 05:11

## 2024-01-01 RX ADMIN — METHYLPREDNISOLONE SODIUM SUCCINATE 40 MG: 40 INJECTION, POWDER, FOR SOLUTION INTRAMUSCULAR; INTRAVENOUS at 09:11

## 2024-01-01 RX ADMIN — MORPHINE SULFATE 2 MG: 2 INJECTION, SOLUTION INTRAMUSCULAR; INTRAVENOUS at 02:11

## 2024-01-01 RX ADMIN — METHYLPREDNISOLONE SODIUM SUCCINATE 60 MG: 125 INJECTION INTRAMUSCULAR; INTRAVENOUS at 11:11

## 2024-01-01 RX ADMIN — AMIODARONE HYDROCHLORIDE 200 MG: 200 TABLET ORAL at 09:11

## 2024-01-01 RX ADMIN — CALCIUM GLUCONATE 1 G: 20 INJECTION, SOLUTION INTRAVENOUS at 10:11

## 2024-01-01 RX ADMIN — APIXABAN 2.5 MG: 2.5 TABLET, FILM COATED ORAL at 10:11

## 2024-01-01 RX ADMIN — HYDROCODONE BITARTRATE AND ACETAMINOPHEN 1 TABLET: 5; 325 TABLET ORAL at 10:11

## 2024-01-01 RX ADMIN — LORAZEPAM 0.5 MG: 0.5 TABLET ORAL at 12:11

## 2024-01-01 RX ADMIN — POLYETHYLENE GLYCOL 3350 17 G: 17 POWDER, FOR SOLUTION ORAL at 10:11

## 2024-01-01 RX ADMIN — METHYLPREDNISOLONE SODIUM SUCCINATE 60 MG: 125 INJECTION INTRAMUSCULAR; INTRAVENOUS at 06:11

## 2024-01-01 RX ADMIN — SCOPOLAMINE 1 PATCH: 1.5 PATCH, EXTENDED RELEASE TRANSDERMAL at 11:11

## 2024-01-01 RX ADMIN — FOLIC ACID 1 MG: 1 TABLET ORAL at 10:11

## 2024-01-01 RX ADMIN — SUCRALFATE 1 G: 1 SUSPENSION ORAL at 06:11

## 2024-01-01 RX ADMIN — Medication 6 MG: at 10:11

## 2024-01-01 RX ADMIN — FOLIC ACID 1 MG: 1 TABLET ORAL at 09:11

## 2024-01-01 RX ADMIN — IPRATROPIUM BROMIDE AND ALBUTEROL SULFATE 3 ML: .5; 3 SOLUTION RESPIRATORY (INHALATION) at 06:11

## 2024-01-01 RX ADMIN — IOHEXOL 100 ML: 350 INJECTION, SOLUTION INTRAVENOUS at 12:11

## 2024-01-01 RX ADMIN — INSULIN ASPART 8 UNITS: 100 INJECTION, SOLUTION INTRAVENOUS; SUBCUTANEOUS at 12:11

## 2024-01-01 RX ADMIN — DEXTROSE MONOHYDRATE 1000 MG: 50 INJECTION, SOLUTION INTRAVENOUS at 02:11

## 2024-01-01 RX ADMIN — MORPHINE SULFATE 2 MG: 2 INJECTION, SOLUTION INTRAMUSCULAR; INTRAVENOUS at 07:11

## 2024-01-01 RX ADMIN — METHYLPREDNISOLONE SODIUM SUCCINATE 40 MG: 40 INJECTION, POWDER, FOR SOLUTION INTRAMUSCULAR; INTRAVENOUS at 10:11

## 2024-01-01 RX ADMIN — INSULIN ASPART 10 UNITS: 100 INJECTION, SOLUTION INTRAVENOUS; SUBCUTANEOUS at 11:11

## 2024-01-01 RX ADMIN — POLYETHYLENE GLYCOL 3350 17 G: 17 POWDER, FOR SOLUTION ORAL at 09:11

## 2024-01-01 RX ADMIN — INSULIN ASPART 6 UNITS: 100 INJECTION, SOLUTION INTRAVENOUS; SUBCUTANEOUS at 12:11

## 2024-01-01 RX ADMIN — METHYLPREDNISOLONE SODIUM SUCCINATE 20 MG: 40 INJECTION, POWDER, FOR SOLUTION INTRAMUSCULAR; INTRAVENOUS at 09:11

## 2024-01-01 RX ADMIN — METOPROLOL SUCCINATE 25 MG: 25 TABLET, FILM COATED, EXTENDED RELEASE ORAL at 09:11

## 2024-01-01 RX ADMIN — INSULIN ASPART 10 UNITS: 100 INJECTION, SOLUTION INTRAVENOUS; SUBCUTANEOUS at 04:11

## 2024-01-01 RX ADMIN — MUPIROCIN 1 G: 20 OINTMENT TOPICAL at 10:11

## 2024-01-01 RX ADMIN — METHYLPREDNISOLONE SODIUM SUCCINATE 60 MG: 125 INJECTION INTRAMUSCULAR; INTRAVENOUS at 05:11

## 2024-01-01 RX ADMIN — APIXABAN 2.5 MG: 2.5 TABLET, FILM COATED ORAL at 09:11

## 2024-01-01 RX ADMIN — Medication 200 ML: at 06:11

## 2024-01-01 RX ADMIN — VANCOMYCIN HYDROCHLORIDE 750 MG: 750 INJECTION, POWDER, LYOPHILIZED, FOR SOLUTION INTRAVENOUS at 04:11

## 2024-01-01 RX ADMIN — INSULIN ASPART 6 UNITS: 100 INJECTION, SOLUTION INTRAVENOUS; SUBCUTANEOUS at 06:11

## 2024-01-01 RX ADMIN — ACETAMINOPHEN 650 MG: 325 TABLET ORAL at 03:11

## 2024-01-01 RX ADMIN — POTASSIUM BICARBONATE 35 MEQ: 391 TABLET, EFFERVESCENT ORAL at 06:11

## 2024-01-01 RX ADMIN — POTASSIUM BICARBONATE 35 MEQ: 391 TABLET, EFFERVESCENT ORAL at 10:11

## 2024-01-01 RX ADMIN — DOXYCYCLINE HYCLATE 100 MG: 100 CAPSULE ORAL at 10:11

## 2024-01-01 RX ADMIN — INSULIN ASPART 3 UNITS: 100 INJECTION, SOLUTION INTRAVENOUS; SUBCUTANEOUS at 11:11

## 2024-01-01 RX ADMIN — MORPHINE SULFATE 2 MG: 2 INJECTION, SOLUTION INTRAMUSCULAR; INTRAVENOUS at 03:11

## 2024-01-01 RX ADMIN — VANCOMYCIN HYDROCHLORIDE 750 MG: 750 INJECTION, POWDER, LYOPHILIZED, FOR SOLUTION INTRAVENOUS at 05:11

## 2024-01-01 RX ADMIN — INSULIN ASPART 3 UNITS: 100 INJECTION, SOLUTION INTRAVENOUS; SUBCUTANEOUS at 10:11

## 2024-01-01 RX ADMIN — Medication 200 ML: at 11:11

## 2024-01-01 RX ADMIN — CEFEPIME 1 G: 1 INJECTION, POWDER, FOR SOLUTION INTRAMUSCULAR; INTRAVENOUS at 02:11

## 2024-01-01 RX ADMIN — TAMSULOSIN HYDROCHLORIDE 0.4 MG: 0.4 CAPSULE ORAL at 09:11

## 2024-01-01 RX ADMIN — LORAZEPAM 1 MG/HR: 2 INJECTION INTRAMUSCULAR; INTRAVENOUS at 11:11

## 2024-01-01 RX ADMIN — AMIODARONE HYDROCHLORIDE 200 MG: 200 TABLET ORAL at 10:11

## 2024-01-01 RX ADMIN — INSULIN ASPART 2 UNITS: 100 INJECTION, SOLUTION INTRAVENOUS; SUBCUTANEOUS at 08:11

## 2024-01-01 RX ADMIN — ACETAMINOPHEN 650 MG: 325 TABLET ORAL at 08:11

## 2024-01-01 RX ADMIN — PREDNISONE 40 MG: 20 TABLET ORAL at 08:11

## 2024-01-01 RX ADMIN — SULFAMETHOXAZOLE AND TRIMETHOPRIM 1 TABLET: 800; 160 TABLET ORAL at 09:11

## 2024-01-01 RX ADMIN — PREDNISONE 20 MG: 20 TABLET ORAL at 10:11

## 2024-01-01 RX ADMIN — INSULIN ASPART 5 UNITS: 100 INJECTION, SOLUTION INTRAVENOUS; SUBCUTANEOUS at 05:11

## 2024-01-01 RX ADMIN — MORPHINE SULFATE 2 MG: 2 INJECTION, SOLUTION INTRAMUSCULAR; INTRAVENOUS at 06:11

## 2024-01-01 RX ADMIN — Medication 200 ML: at 09:11

## 2024-01-01 RX ADMIN — METHYLPREDNISOLONE SODIUM SUCCINATE 20 MG: 40 INJECTION, POWDER, FOR SOLUTION INTRAMUSCULAR; INTRAVENOUS at 02:11

## 2024-01-01 RX ADMIN — Medication 6 MG: at 08:11

## 2024-01-01 RX ADMIN — HYDROCODONE BITARTRATE AND ACETAMINOPHEN 1 TABLET: 5; 325 TABLET ORAL at 03:11

## 2024-01-01 RX ADMIN — SUCRALFATE 1 G: 1 SUSPENSION ORAL at 11:11

## 2024-01-01 RX ADMIN — CEFEPIME HYDROCHLORIDE 2 G: 2 INJECTION, POWDER, FOR SOLUTION INTRAVENOUS at 02:11

## 2024-01-01 RX ADMIN — LORAZEPAM 0.5 MG: 0.5 TABLET ORAL at 11:11

## 2024-01-01 RX ADMIN — DIAZEPAM 7.5 MG: 5 INJECTION, SOLUTION INTRAMUSCULAR; INTRAVENOUS at 10:11

## 2024-01-01 RX ADMIN — ALUMINUM HYDROXIDE, MAGNESIUM HYDROXIDE, AND SIMETHICONE 30 ML: 200; 200; 20 SUSPENSION ORAL at 08:11

## 2024-01-01 RX ADMIN — Medication 400 MG: at 09:11

## 2024-01-01 RX ADMIN — INSULIN ASPART 8 UNITS: 100 INJECTION, SOLUTION INTRAVENOUS; SUBCUTANEOUS at 05:11

## 2024-01-01 RX ADMIN — Medication 6 MG: at 09:11

## 2024-01-01 RX ADMIN — DIAZEPAM 7.5 MG: 5 INJECTION, SOLUTION INTRAMUSCULAR; INTRAVENOUS at 02:11

## 2024-01-01 RX ADMIN — ACETAMINOPHEN 650 MG: 325 TABLET ORAL at 06:11

## 2024-01-01 RX ADMIN — LORAZEPAM 1 MG: 2 INJECTION INTRAMUSCULAR; INTRAVENOUS at 09:11

## 2024-01-01 RX ADMIN — ACETAMINOPHEN 650 MG: 325 TABLET ORAL at 12:11

## 2024-01-01 RX ADMIN — FOLIC ACID 1 MG: 1 TABLET ORAL at 02:11

## 2024-01-01 RX ADMIN — DOXYCYCLINE HYCLATE 100 MG: 100 CAPSULE ORAL at 02:11

## 2024-01-01 RX ADMIN — ACETAMINOPHEN 650 MG: 325 TABLET ORAL at 11:11

## 2024-01-01 RX ADMIN — HYDROCODONE BITARTRATE AND ACETAMINOPHEN 1 TABLET: 5; 325 TABLET ORAL at 02:11

## 2024-01-01 RX ADMIN — ACETAMINOPHEN 650 MG: 325 TABLET ORAL at 09:11

## 2024-01-01 RX ADMIN — MORPHINE SULFATE 5 MG: 2 INJECTION, SOLUTION INTRAMUSCULAR; INTRAVENOUS at 02:11

## 2024-01-01 RX ADMIN — INSULIN ASPART 6 UNITS: 100 INJECTION, SOLUTION INTRAVENOUS; SUBCUTANEOUS at 01:11

## 2024-01-01 RX ADMIN — MORPHINE SULFATE 2 MG/HR: 10 INJECTION INTRAVENOUS at 08:11

## 2024-01-01 RX ADMIN — LORAZEPAM 1 MG: 2 INJECTION INTRAMUSCULAR; INTRAVENOUS at 03:11

## 2024-01-01 RX ADMIN — METHYLPREDNISOLONE SODIUM SUCCINATE 20 MG: 40 INJECTION, POWDER, FOR SOLUTION INTRAMUSCULAR; INTRAVENOUS at 06:11

## 2024-01-01 RX ADMIN — METHYLPREDNISOLONE SODIUM SUCCINATE 40 MG: 40 INJECTION, POWDER, FOR SOLUTION INTRAMUSCULAR; INTRAVENOUS at 03:11

## 2024-01-01 RX ADMIN — DOXYCYCLINE HYCLATE 100 MG: 100 CAPSULE ORAL at 09:11

## 2024-01-01 RX ADMIN — LORAZEPAM 1 MG: 2 INJECTION INTRAMUSCULAR; INTRAVENOUS at 08:11

## 2024-01-01 RX ADMIN — MORPHINE SULFATE 2 MG: 2 INJECTION, SOLUTION INTRAMUSCULAR; INTRAVENOUS at 10:11

## 2024-01-01 RX ADMIN — METHYLPREDNISOLONE SODIUM SUCCINATE 60 MG: 125 INJECTION INTRAMUSCULAR; INTRAVENOUS at 01:11

## 2024-01-01 RX ADMIN — ACETAMINOPHEN 650 MG: 325 TABLET ORAL at 10:11

## 2024-01-01 RX ADMIN — FUROSEMIDE 20 MG: 10 INJECTION, SOLUTION INTRAMUSCULAR; INTRAVENOUS at 08:11

## 2024-01-01 RX ADMIN — LORAZEPAM 1 MG: 2 INJECTION INTRAMUSCULAR; INTRAVENOUS at 01:11

## 2024-01-01 RX ADMIN — ONDANSETRON 4 MG: 2 INJECTION INTRAMUSCULAR; INTRAVENOUS at 08:11

## 2024-01-01 RX ADMIN — FUROSEMIDE 40 MG: 10 INJECTION, SOLUTION INTRAVENOUS at 12:11

## 2024-01-01 RX ADMIN — INSULIN ASPART 5 UNITS: 100 INJECTION, SOLUTION INTRAVENOUS; SUBCUTANEOUS at 10:11

## 2024-01-01 RX ADMIN — INSULIN ASPART 3 UNITS: 100 INJECTION, SOLUTION INTRAVENOUS; SUBCUTANEOUS at 05:11

## 2024-01-01 RX ADMIN — IPRATROPIUM BROMIDE AND ALBUTEROL SULFATE 3 ML: .5; 3 SOLUTION RESPIRATORY (INHALATION) at 12:11

## 2024-01-01 RX ADMIN — INSULIN ASPART 4 UNITS: 100 INJECTION, SOLUTION INTRAVENOUS; SUBCUTANEOUS at 04:11

## 2024-01-01 RX ADMIN — MORPHINE SULFATE 2 MG: 2 INJECTION, SOLUTION INTRAMUSCULAR; INTRAVENOUS at 08:11

## 2024-01-01 RX ADMIN — Medication 6 MG: at 12:11

## 2024-01-01 RX ADMIN — SODIUM CHLORIDE: 0.9 INJECTION, SOLUTION INTRAVENOUS at 02:11

## 2024-01-01 RX ADMIN — Medication 200 ML: at 01:11

## 2024-01-01 RX ADMIN — PREDNISONE 60 MG: 20 TABLET ORAL at 10:11

## 2024-01-01 RX ADMIN — METHYLPREDNISOLONE SODIUM SUCCINATE 60 MG: 125 INJECTION INTRAMUSCULAR; INTRAVENOUS at 07:11

## 2024-01-01 RX ADMIN — MUPIROCIN 1 G: 20 OINTMENT TOPICAL at 02:11

## 2024-01-01 RX ADMIN — PREDNISONE 40 MG: 20 TABLET ORAL at 12:11

## 2024-01-01 RX ADMIN — INSULIN ASPART 3 UNITS: 100 INJECTION, SOLUTION INTRAVENOUS; SUBCUTANEOUS at 09:11

## 2024-01-03 ENCOUNTER — HOSPITAL ENCOUNTER (OUTPATIENT)
Dept: PULMONOLOGY | Facility: HOSPITAL | Age: 84
Discharge: HOME | End: 2024-01-03
Attending: INTERNAL MEDICINE
Payer: MEDICARE

## 2024-01-03 VITALS — WEIGHT: 115 LBS | BODY MASS INDEX: 20.38 KG/M2 | HEIGHT: 63 IN

## 2024-01-03 DIAGNOSIS — R06.02 SOB (SHORTNESS OF BREATH): ICD-10-CM

## 2024-01-03 PROCEDURE — 94618 PULMONARY STRESS TESTING: CPT

## 2024-01-03 NOTE — CARE UPDATE
"   01/03/24 1137   6MW Test   Ordering Provider Neva Christian MD   Diagnosis Shortness of Breath;Qualify for Oxygen   Height 5' 3" (1.6 m)   Weight 52.2 kg (115 lb)   BMI (Calculated) 20.4   Predicted Distance Meters (Calculated) 411.18 meters   Patient Race    6MWT Status completed with stops   Patient Reported Dyspnea   Was O2 used? Yes   Delivery Method Pull Tank;Cannula;Continuous Flow   6MW Distance walked (feet) 1200 feet   Distance walked (meters) 365.76 meters   Did patient stop? Yes   How many times? 1   Did patient restart? Yes   Type of assistive device(s) used? no assistive devices   Is extra documentation required for this patient? Yes   Pre-Exercise   Oxygen Saturation 98 %   Supplemental Oxygen Room Air   Heart Rate 89 bpm   Darlene Dyspnea Rating  moderate   Exercise 1 Minute   Oxygen Saturation 92 %   Supplemental Oxygen Room Air   Heart Rate 109 bpm   Exercise 2 Minutes   Oxygen Saturation 88 %   Supplemental Oxygen Room Air   Heart Rate 109 bpm   Exercise 3 Minutes   Oxygen Saturation 87 %   Supplemental Oxygen Room Air   Heart Rate 110 bpm   Exercise 4 Minutes   Oxygen Saturation 92 %   Supplemental Oxygen 2 L/M   Heart Rate 99 bpm   Exercise 5 Minutes   Oxygen Saturation 96 %   Supplemental Oxygen 2 L/M   Heart Rate 100 bpm   Post Exercise   Oxygen Saturation 96 %   Supplemental Oxygen 2 L/M   Heart Rate 101 bpm   Darlene Dyspnea Rating  heavy   Recovery   Oxygen Saturation 98 %   Supplemental Oxygen 2 L/M   Heart Rate 100 bpm   Darlene Dyspnea Rating  somewhat heavy   Interpretation   Is procedure ready for interpretation? Yes   Did the patient stop or pause? No   Total Laps Walked 6   Final Partial Lap Distance (feet) 0 feet   Total Distance Feet (Calculated) 1200 feet   Total Distance Meters (Calculated) 365.76 meters   Percentage of Predicted (Calculated) 88.95   Peak VO2 (Calculated) 14.95   Mets 4.27   Has The Patient Had a Previous Six Minute Walk Test? No   Comments This is a " hypoxemic 6 min. walk.   Patient requires 2 liters of oxygen to adequately oxygenate with ambulation.   Oxygen Qualification   Oxygen Qualification? Yes

## 2024-01-04 ENCOUNTER — TELEPHONE (OUTPATIENT)
Dept: PULMONOLOGY | Facility: CLINIC | Age: 84
End: 2024-01-04

## 2024-01-04 DIAGNOSIS — R09.02 HYPOXEMIA: Primary | ICD-10-CM

## 2024-01-04 DIAGNOSIS — C34.90 BRONCHOGENIC CARCINOMA: ICD-10-CM

## 2024-01-04 NOTE — PHYSICIAN QUERY
PT Name: Alexa Otero  MR #: [id number]     DOCUMENTATION CLARIFICATION      CDS/: Chelsie Del Angel               Contact information:    This form is a permanent document in the medical record.     Query Date: January 4, 2024    Dear Provider,  By submitting this query, we are merely seeking further clarification of documentation.  Please utilize your independent clinical judgment when addressing the question(s) below.     The Medical Record contains the following:    Supporting Clinical Findings Location in Medical Record   WBC and Respiratory Culture all WNL Lab Results   12/13 the patient's bronchoscopy does not show any infectious component.  Her bronchoscopy did not look at all inflamed mucousy etc..  There was no reason for the patient to stay in the hospital to receive anymore antibiotics.  These can be stopped.  I am planning to do a bronch with transbronchial biopsies on Friday.  Hopefully this will give us an answer but not necessarily.  I think the patient may benefit from an outpatient PET scan.  Dr. Cassidy is her oncologist and I am reaching out to him.  Progress Notes by Neva Christian MD at 12/13/2023    Pulm was consulted and patient bronched on 12/12. Respiratory culture w/normal cece. 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  Discharge Summary   Since bacterial pneumonia was thought to be less likely, patient was discharged w/out antibiotics.     Discharge Summary   Pneumonia  Discharge Summary     Please clarify if the Pneumonia diagnosis has been:    [  ] Ruled In   [  ] Ruled In, Now Resolved   [x  ] Ruled Out   [  ] Still to be ruled out at the time of discharge   [  ] Other/Clarification of findings (please specify):

## 2024-01-05 ENCOUNTER — TELEPHONE (OUTPATIENT)
Dept: PULMONOLOGY | Facility: CLINIC | Age: 84
End: 2024-01-05

## 2024-01-05 ENCOUNTER — OUTPATIENT CASE MANAGEMENT (OUTPATIENT)
Dept: ADMINISTRATIVE | Facility: OTHER | Age: 84
End: 2024-01-05
Payer: MEDICARE

## 2024-01-05 NOTE — PROGRESS NOTES
01/05/24 - [facility] in[facility] message from Mr. Otero in re: to Mrs. Kim needed oxygen; reviewed chart and pulm note.  Called Erin at [facility] at [phone]; she denied having the order and they do not have access to the EMR; she requested the order be sent to [phone] twice just as a precaution; she will be on the look out for it; she notes that it takes a couple of hours to process once they receive it.  OPCM RN sent in basket message to pulm office with the above info request.    OPCM RN contacted Mrs. Kim and informed her of above.  OPCM RN will follow up.

## 2024-01-05 NOTE — TELEPHONE ENCOUNTER
Called patient she said have not heard any thing about Oxygen they do not have the order.   Told her Order is in [facility] work Que they will send to [facility] usually takes 7 working days to get approval, after that they will contact you.  If you do not hear anything from them within a week please give us a call.  She verbalized an understanding.

## 2024-01-05 NOTE — TELEPHONE ENCOUNTER
----- Message from Joe Saldaña sent at 1/5/2024  9:58 AM CST -----  Regarding: Oxygen  Ms. Liu called regarding the walk test and her oxygen. Please give her a call [phone].  Thank you

## 2024-01-06 ENCOUNTER — NURSE TRIAGE (OUTPATIENT)
Dept: ADMINISTRATIVE | Facility: CLINIC | Age: 84
End: 2024-01-06
Payer: MEDICARE

## 2024-01-06 NOTE — TELEPHONE ENCOUNTER
Reason for Disposition   [1] Prescription refill request for ESSENTIAL medicine (i.e., likelihood of harm to patient if not taken) AND [2] triager unable to refill per department policy    Protocols used: Medication Refill and Renewal Call-A-AH  Pt called stating at end of oct had lobectomy with dr espinoza (non [facility] surgeon). Pt reports she was given magnesium and its not refillable. Pt states [facility] but hasn't heard anything. Pt denies any new or worsening sx. Pt states wants to know if she needs to still take it. Pt states she has not contacted dr Espinoza's office. Pt warm transferred to speak with answering service.

## 2024-01-08 LAB — FUNGUS SPEC CULT: NORMAL

## 2024-01-08 NOTE — TELEPHONE ENCOUNTER
Called patient and advised she needs to call Dr Galvez's office in regards to magnesium prescription. Verbalized understanding.

## 2024-01-09 NOTE — PROGRESS NOTES
PROGRESS NOTE    Subjective:       Patient ID: Alexa Otero is a 83 y.o. female.    8/15/2023 PET:  IMPRESSION:  1. Multifocal right lower lobe and right pleural FDG hypermetabolism as described, with associated right lower lobe consolidation. These are nonspecific and could be of infectious or inflammatory etiology in the clinical setting of chronic pneumonia, and or metastatic disease. Correlation with previous bronchoscopy results is needed.  2. Multiple new non hypermetabolic pulmonary nodules in both lungs, which are generally below size resolution for PET, but suspicious for pulmonary metastases.  3. No additional findings of FDG avid metastatic disease.    10/31/2023 RLL Lobectomy:  LUNG SYNOPTIC REPORT   PROCEDURE:     Lobectomy.   SPECIMEN LATERALITY:    Right   TUMOR SITE:     Lower lobe.   TUMOR SIZE:     Cannot be determined, 4.5 cm area of cavitation but    tumor appears to involve most if not all of the resected lobe.   TUMOR FOCALITY:    Single tumor.   HISTOLOGIC TYPE:    Invasive mucinous adenocarcinoma with pneumonic    pattern.   VISCEROPLEURAL INVASION:   Present   LYMPHOVASCULAR INVASION:   Not identified.   SPREAD OF TUMOR THROUGH AIR SPACES:  Present   DIRECT INVASION OF ADJACENT STRUCTURES: No adjacent structures present.   MARGINS:     All margins are uninvolved by tumor, margins examined:         Bronchial, distance of invasion of tumor from closest         margin: cannot be determined.   TREATMENT EFFECT:    No known presurgical therapy.   ADDITIONAL PATHOLOGIC FINDINGS:  Organized pulmonary emboli, pulmonary    infarct.   REGIONAL LYMPH NODES:    NUMBER OF LYMPH NODES INVOLVED:  Zero, number of lymph nodes examined:     1, yue         structures examined: 10 (hilar)   PATHOLOGIC STAGE CLASSIFICATION    OF PRIMARY TUMOR:    pT3    REGIONAL LYMPH NODES:   pN0     Comment: The sections show invasive mucinous adenocarcinoma with     findings that suggest so-called pneumonic pattern. there is diffuse    involvement with tumor present in all the histologic sections, often    with a lepidic pattern suggesting spread through the air spaces. The    reported CT findings of a consolidated appearance correlates with the    histologic findings and the reported clinical presentation of a    year-long cough productive of purulent mucus. Mucus is also a common    presentation of this somewhat uncommon pulmonary malignancy. These    tumors are two complete stages pT3 when there appears to be involvement    of the entire lobe; this appears to be appropriate in this case. There    are also organizing/organized pulmonary emboli within the vasculature,    possibly indicating hypercoagulability of malignancy as can be seen    especially with mucinous tumors. The tissue sampled in block 2F is    likely an infarct related to this. The history of endometrioid    adenocarcinoma is noted, but the current tumor represents a primary    lung malignancy and is unrelated to the prior cancer.     10/13/2023-Echo:  Normal systolic function with EF 55-60%  Grade I diastolic dysfunction    12/10/2023-CT CAP:  Progression of the alveolar consolidation in the right mid and lower lung with moderate size right pleural effusion. There is progression of the airspace disease within the right upper lobe with patchy groundglass opacity left upper lobe and left lung base. Findings are compatible with multifocal.     12/15/2023-Bronchoscopy:  LUNG, RIGHT MIDDLE LOBE, BIOPSY:   - ATYPICAL ADENOMATOUS HYPERPLASIA.   - IN A BACKGROUND OF REACTIVE FIBROSIS AND FIBRIN.     Comment:   Given the patient's history, this lesion is concerning for well    differentiated lepidic type adenocarcinoma but is quantitatively    insufficient (approximately 1 mm) for a definite diagnosis.     Multiple additional leveled sections were examined.     Caris Molecular testing negative for any specific  molecular mutations as well as PDL1 Negatvie    Chief Complaint:  Lung cancer follow up    History of Present Illness:   Alexa Otero is a 83 y.o. female who presents for follow up of lung cancer.      Patient is here to discuss the results of the PET scan and caris molecular testing. PET scan shows pleural effusion and multiple lung nodules. Caris is negative for any molecular mutations and PDL1. Per Dr. Cassidy discussed starting treatment with Carboplain and Alimta at 75% dosing.      Family and Social history reviewed and is unchanged from 12/1/2023             Current Outpatient Medications:     amiodarone (PACERONE) 200 MG Tab, Take 1 tablet (200 mg total) by mouth 2 (two) times daily., Disp: 60 tablet, Rfl: 11    aspirin (ECOTRIN) 81 MG EC tablet, Take 81 mg by mouth once daily., Disp: , Rfl:     benazepriL (LOTENSIN) 40 MG tablet, 1/2 po qd, Disp: 45 tablet, Rfl: 3    clopidogreL (PLAVIX) 75 mg tablet, Take 1 tablet (75 mg total) by mouth once daily., Disp: , Rfl:     gabapentin (NEURONTIN) 100 MG capsule, TAKE 1 CAPSULE(100 MG) BY MOUTH TWICE DAILY (Patient taking differently: Take 100 mg by mouth 2 (two) times daily.), Disp: 180 capsule, Rfl: 3    HYDROcodone-acetaminophen (NORCO) 5-325 mg per tablet, Take 1 tablet by mouth every 6 (six) hours as needed for Pain., Disp: , Rfl:     LORazepam (ATIVAN) 1 MG tablet, Take 1 tablet (1 mg total) by mouth every 6 (six) hours as needed for Anxiety (insomnia)., Disp: 30 tablet, Rfl: 2    magnesium oxide (MAG-OX) 400 mg (241.3 mg magnesium) tablet, Take 400 mg by mouth once daily. Patient requested refill, Disp: , Rfl:     melatonin 5 mg Chew, Take 1 tablet by mouth nightly as needed (insomnia)., Disp: , Rfl:     metoprolol succinate (TOPROL-XL) 25 MG 24 hr tablet, Take 25 mg by mouth once daily., Disp: , Rfl:     omeprazole (PRILOSEC) 40 MG capsule, Take 1 capsule (40 mg total) by mouth once daily., Disp: 90 capsule, Rfl: 3    potassium chloride (K-TAB) 20 mEq,  "Take 1 tablet by mouth once daily., Disp: , Rfl:     VIT A/VIT C/VIT E/ZINC/COPPER (ICAPS AREDS ORAL), Take 1 capsule by mouth 2 (two) times a day. , Disp: , Rfl:     albuterol (VENTOLIN HFA) 90 mcg/actuation inhaler, Inhale 2 puffs into the lungs every 4 (four) hours as needed for Wheezing or Shortness of Breath. Rescue, Disp: 6.7 g, Rfl: 1    folic acid (FOLVITE) 1 MG tablet, Take 1 tablet (1 mg total) by mouth once daily., Disp: 100 tablet, Rfl: 3    ondansetron (ZOFRAN) 8 MG tablet, Take 1 tablet (8 mg total) by mouth every 8 (eight) hours as needed., Disp: 30 tablet, Rfl: 5    promethazine (PHENERGAN) 25 MG tablet, Take 1 tablet (25 mg total) by mouth every 4 (four) hours as needed for Nausea., Disp: 30 tablet, Rfl: 5        Objective:       Physical Examination:     BP (!) 184/80   Pulse 82   Temp 96.5 °F (35.8 °C)   Resp 20   Ht 5' 3" (1.6 m)   Wt 52.6 kg (116 lb)   SpO2 98% Comment: 2lts O2  BMI 20.55 kg/m²     Physical Exam  Constitutional:       Appearance: She is ill-appearing.   HENT:      Head: Normocephalic and atraumatic.      Nose: Nose normal.      Mouth/Throat:      Mouth: Mucous membranes are moist.   Eyes:      General: No scleral icterus.     Conjunctiva/sclera: Conjunctivae normal.   Cardiovascular:      Rate and Rhythm: Normal rate.      Comments: Portable oxygen  Pulmonary:      Effort: Pulmonary effort is normal.   Abdominal:      General: Abdomen is flat.   Skin:     General: Skin is warm and dry.   Neurological:      General: No focal deficit present.      Mental Status: She is alert and oriented to person, place, and time.   Psychiatric:         Mood and Affect: Mood normal.         Behavior: Behavior normal.         Thought Content: Thought content normal.         Judgment: Judgment normal.         Labs:   Recent Results (from the past 336 hour(s))   CBC Auto Differential    Collection Time: 01/11/24  2:48 PM   Result Value Ref Range    WBC 9.22 3.90 - 12.70 K/uL    Hemoglobin " 10.7 (L) 12.0 - 16.0 g/dL    Hematocrit 36.3 (L) 37.0 - 48.5 %    Platelets 378 150 - 450 K/uL       CMP  Sodium   Date Value Ref Range Status   01/11/2024 139 136 - 145 mmol/L Final     Potassium   Date Value Ref Range Status   01/11/2024 3.9 3.5 - 5.1 mmol/L Final     Chloride   Date Value Ref Range Status   01/11/2024 102 95 - 110 mmol/L Final     CO2   Date Value Ref Range Status   01/11/2024 28 23 - 29 mmol/L Final     Glucose   Date Value Ref Range Status   01/11/2024 141 (H) 70 - 110 mg/dL Final     BUN   Date Value Ref Range Status   01/11/2024 25 (H) 8 - 23 mg/dL Final     Creatinine   Date Value Ref Range Status   01/11/2024 1.0 0.5 - 1.4 mg/dL Final     Calcium   Date Value Ref Range Status   01/11/2024 9.6 8.7 - 10.5 mg/dL Final     Total Protein   Date Value Ref Range Status   01/11/2024 7.0 6.0 - 8.4 g/dL Final     Albumin   Date Value Ref Range Status   01/11/2024 3.5 3.5 - 5.2 g/dL Final     Total Bilirubin   Date Value Ref Range Status   01/11/2024 0.3 0.1 - 1.0 mg/dL Final     Comment:     For infants and newborns, interpretation of results should be based  on gestational age, weight and in agreement with clinical  observations.    Premature Infant recommended reference ranges:  Up to 24 hours.............<8.0 mg/dL  Up to 48 hours............<12.0 mg/dL  3-5 days..................<15.0 mg/dL  6-29 days.................<15.0 mg/dL       Alkaline Phosphatase   Date Value Ref Range Status   01/11/2024 53 (L) 55 - 135 U/L Final     AST   Date Value Ref Range Status   01/11/2024 12 10 - 40 U/L Final     ALT   Date Value Ref Range Status   01/11/2024 14 10 - 44 U/L Final     Anion Gap   Date Value Ref Range Status   01/11/2024 9 8 - 16 mmol/L Final     eGFR if    Date Value Ref Range Status   04/28/2022 40.6 (A) >60 mL/min/1.73 m^2 Final     eGFR if non    Date Value Ref Range Status   04/28/2022 35.3 (A) >60 mL/min/1.73 m^2 Final     Comment:     Calculation used to  "obtain the estimated glomerular filtration  rate (eGFR) is the CKD-EPI equation.        Lab Results   Component Value Date    CEA 1.3 07/23/2020     No results found for: "PSA"        Assessment/Plan:     Problem List Items Addressed This Visit          Oncology    Malignant neoplasm of lower lobe of right lung - Primary    Relevant Medications    ondansetron (ZOFRAN) 8 MG tablet    promethazine (PHENERGAN) 25 MG tablet    folic acid (FOLVITE) 1 MG tablet    Other Relevant Orders    OXYGEN FOR HOME USE    CBC Auto Differential    CMP    Magnesium     Other Visit Diagnoses       Nausea              Lung Cancer- Start treatment with Carboplatin and Alimta; Amb ref to Chemo school (will do day of treatment), CBC, CMP and Mag weekly; Start Folic acid and B12 daily  2.  Nausea- Zofran and Phenergan PRN      Discussion:     Follow up in about 3 weeks (around 2/1/2024) for with Dr. Cassidy and 6 weeks with me.      Electronically signed by Patito Gibson, MSN, APRN, AGNP-C, OCN      "

## 2024-01-10 ENCOUNTER — OUTPATIENT CASE MANAGEMENT (OUTPATIENT)
Dept: ADMINISTRATIVE | Facility: OTHER | Age: 84
End: 2024-01-10
Payer: MEDICARE

## 2024-01-10 ENCOUNTER — TELEPHONE (OUTPATIENT)
Dept: FAMILY MEDICINE | Facility: CLINIC | Age: 84
End: 2024-01-10
Payer: MEDICARE

## 2024-01-10 NOTE — PROGRESS NOTES
Outpatient Care Management  Plan of Care Follow Up Visit    Patient: Alexa Otero  MRN: [id number]  Date of Service: 01/10/2024  Completed by: Hui Hayes RN  Referral Date: 12/12/2023    No chief complaint on file.      Brief Summary: OPCM RN followed up with Mrs. Liu today for care plan review.    Next Steps:   Mrs. Liu agreed to OPCM RN follow up on or around 1/17/24.  Message PCP re: need for magox to be continued or not; will need Rx if continues.  Follow up post hemoc visit for review of treatment plan.  PATIENT SELF MANAGEMENT PLAN:  Mrs. Liu agreed to continue comply with medication regimen within the next 2 weeks.  Mrs. Otero agrees to utilize DME appropriately daily.

## 2024-01-10 NOTE — TELEPHONE ENCOUNTER
----- Message from Hui Hayes RN sent at 1/10/2024  2:03 PM CST -----  Good afternoon,    I recently followed up with Mrs. Otero and wanted to provide an update.  She has received oxygen and is using 2 lpm nc continuously.  Her oxygen saturations on the 2 lpm is averaging 95 to 96%.  Her biggest concern is her nor her spouse being able to lift the portable oxygen tank.  She was provided Baileyu's contact # to see if it's covered through her insurance.    In addition, she has a follow up tomorrow, 1/11/24, with hemoc and I will follow up next week with her regarding reviewing her treatment plan.    Furthermore, she voiced she was prescribed MagOx 400 mg daily and received no refills.  She is requesting to know if she should still be taking this and if she is, she will need a Rx.    #Please reach out to Mrs. Otero re: MagOx#.    Kind regards,    Hui Hayes RN, BSN, CCM [facility]  [phone]

## 2024-01-11 ENCOUNTER — LAB VISIT (OUTPATIENT)
Dept: LAB | Facility: HOSPITAL | Age: 84
End: 2024-01-11
Attending: INTERNAL MEDICINE
Payer: MEDICARE

## 2024-01-11 ENCOUNTER — OFFICE VISIT (OUTPATIENT)
Dept: HEMATOLOGY/ONCOLOGY | Facility: CLINIC | Age: 84
End: 2024-01-11
Payer: MEDICARE

## 2024-01-11 ENCOUNTER — TELEPHONE (OUTPATIENT)
Dept: PULMONOLOGY | Facility: CLINIC | Age: 84
End: 2024-01-11

## 2024-01-11 VITALS
RESPIRATION RATE: 20 BRPM | HEIGHT: 63 IN | TEMPERATURE: 97 F | HEART RATE: 82 BPM | SYSTOLIC BLOOD PRESSURE: 184 MMHG | WEIGHT: 116 LBS | DIASTOLIC BLOOD PRESSURE: 80 MMHG | BODY MASS INDEX: 20.55 KG/M2 | OXYGEN SATURATION: 98 %

## 2024-01-11 DIAGNOSIS — C34.31 MALIGNANT NEOPLASM OF LOWER LOBE OF RIGHT LUNG: Primary | ICD-10-CM

## 2024-01-11 DIAGNOSIS — C34.31 MALIGNANT NEOPLASM OF LOWER LOBE OF RIGHT LUNG: ICD-10-CM

## 2024-01-11 DIAGNOSIS — R05.9 COUGH, UNSPECIFIED TYPE: Primary | ICD-10-CM

## 2024-01-11 DIAGNOSIS — R11.0 NAUSEA: ICD-10-CM

## 2024-01-11 LAB
ALBUMIN SERPL BCP-MCNC: 3.5 G/DL (ref 3.5–5.2)
ALP SERPL-CCNC: 53 U/L (ref 55–135)
ALT SERPL W/O P-5'-P-CCNC: 14 U/L (ref 10–44)
ANION GAP SERPL CALC-SCNC: 9 MMOL/L (ref 8–16)
AST SERPL-CCNC: 12 U/L (ref 10–40)
BASOPHILS # BLD AUTO: 0.04 K/UL (ref 0–0.2)
BASOPHILS NFR BLD: 0.4 % (ref 0–1.9)
BILIRUB SERPL-MCNC: 0.3 MG/DL (ref 0.1–1)
BUN SERPL-MCNC: 25 MG/DL (ref 8–23)
CALCIUM SERPL-MCNC: 9.6 MG/DL (ref 8.7–10.5)
CHLORIDE SERPL-SCNC: 102 MMOL/L (ref 95–110)
CO2 SERPL-SCNC: 28 MMOL/L (ref 23–29)
CREAT SERPL-MCNC: 1 MG/DL (ref 0.5–1.4)
DIFFERENTIAL METHOD BLD: ABNORMAL
EOSINOPHIL # BLD AUTO: 0.1 K/UL (ref 0–0.5)
EOSINOPHIL NFR BLD: 1.1 % (ref 0–8)
ERYTHROCYTE [DISTWIDTH] IN BLOOD BY AUTOMATED COUNT: 15.9 % (ref 11.5–14.5)
EST. GFR  (NO RACE VARIABLE): 56.2 ML/MIN/1.73 M^2
GLUCOSE SERPL-MCNC: 141 MG/DL (ref 70–110)
HCT VFR BLD AUTO: 36.3 % (ref 37–48.5)
HGB BLD-MCNC: 10.7 G/DL (ref 12–16)
IMM GRANULOCYTES # BLD AUTO: 0.04 K/UL (ref 0–0.04)
IMM GRANULOCYTES NFR BLD AUTO: 0.4 % (ref 0–0.5)
LYMPHOCYTES # BLD AUTO: 1 K/UL (ref 1–4.8)
LYMPHOCYTES NFR BLD: 11.1 % (ref 18–48)
MAGNESIUM SERPL-MCNC: 1.7 MG/DL (ref 1.6–2.6)
MCH RBC QN AUTO: 26.2 PG (ref 27–31)
MCHC RBC AUTO-ENTMCNC: 29.5 G/DL (ref 32–36)
MCV RBC AUTO: 89 FL (ref 82–98)
MONOCYTES # BLD AUTO: 0.8 K/UL (ref 0.3–1)
MONOCYTES NFR BLD: 8.4 % (ref 4–15)
NEUTROPHILS # BLD AUTO: 7.3 K/UL (ref 1.8–7.7)
NEUTROPHILS NFR BLD: 78.6 % (ref 38–73)
NRBC BLD-RTO: 0 /100 WBC
PLATELET # BLD AUTO: 378 K/UL (ref 150–450)
PMV BLD AUTO: 8.9 FL (ref 9.2–12.9)
POTASSIUM SERPL-SCNC: 3.9 MMOL/L (ref 3.5–5.1)
PROT SERPL-MCNC: 7 G/DL (ref 6–8.4)
RBC # BLD AUTO: 4.08 M/UL (ref 4–5.4)
SODIUM SERPL-SCNC: 139 MMOL/L (ref 136–145)
WBC # BLD AUTO: 9.22 K/UL (ref 3.9–12.7)

## 2024-01-11 PROCEDURE — 85025 COMPLETE CBC W/AUTO DIFF WBC: CPT | Performed by: NURSE PRACTITIONER

## 2024-01-11 PROCEDURE — 1126F AMNT PAIN NOTED NONE PRSNT: CPT | Mod: CPTII,S$GLB,, | Performed by: NURSE PRACTITIONER

## 2024-01-11 PROCEDURE — 1159F MED LIST DOCD IN RCRD: CPT | Mod: CPTII,S$GLB,, | Performed by: NURSE PRACTITIONER

## 2024-01-11 PROCEDURE — 80053 COMPREHEN METABOLIC PANEL: CPT | Performed by: NURSE PRACTITIONER

## 2024-01-11 PROCEDURE — 3288F FALL RISK ASSESSMENT DOCD: CPT | Mod: CPTII,S$GLB,, | Performed by: NURSE PRACTITIONER

## 2024-01-11 PROCEDURE — 1101F PT FALLS ASSESS-DOCD LE1/YR: CPT | Mod: CPTII,S$GLB,, | Performed by: NURSE PRACTITIONER

## 2024-01-11 PROCEDURE — 99215 OFFICE O/P EST HI 40 MIN: CPT | Mod: S$GLB,,, | Performed by: NURSE PRACTITIONER

## 2024-01-11 PROCEDURE — 1157F ADVNC CARE PLAN IN RCRD: CPT | Mod: CPTII,S$GLB,, | Performed by: NURSE PRACTITIONER

## 2024-01-11 PROCEDURE — 3079F DIAST BP 80-89 MM HG: CPT | Mod: CPTII,S$GLB,, | Performed by: NURSE PRACTITIONER

## 2024-01-11 PROCEDURE — 83735 ASSAY OF MAGNESIUM: CPT | Performed by: NURSE PRACTITIONER

## 2024-01-11 PROCEDURE — 3077F SYST BP >= 140 MM HG: CPT | Mod: CPTII,S$GLB,, | Performed by: NURSE PRACTITIONER

## 2024-01-11 PROCEDURE — 1160F RVW MEDS BY RX/DR IN RCRD: CPT | Mod: CPTII,S$GLB,, | Performed by: NURSE PRACTITIONER

## 2024-01-11 PROCEDURE — 1111F DSCHRG MED/CURRENT MED MERGE: CPT | Mod: CPTII,S$GLB,, | Performed by: NURSE PRACTITIONER

## 2024-01-11 PROCEDURE — 36415 COLL VENOUS BLD VENIPUNCTURE: CPT | Performed by: NURSE PRACTITIONER

## 2024-01-11 RX ORDER — ONDANSETRON HYDROCHLORIDE 8 MG/1
8 TABLET, FILM COATED ORAL EVERY 8 HOURS PRN
Qty: 30 TABLET | Refills: 5 | Status: SHIPPED | OUTPATIENT
Start: 2024-01-11 | End: 2025-01-10

## 2024-01-11 RX ORDER — GUAIFENESIN 100 MG/5ML
200 SOLUTION ORAL 3 TIMES DAILY PRN
Qty: 100 ML | Refills: 0 | Status: SHIPPED | OUTPATIENT
Start: 2024-01-11 | End: 2024-01-22

## 2024-01-11 RX ORDER — FOLIC ACID 1 MG/1
1 TABLET ORAL DAILY
Qty: 100 TABLET | Refills: 3 | Status: SHIPPED | OUTPATIENT
Start: 2024-01-11 | End: 2025-01-10

## 2024-01-11 RX ORDER — PROMETHAZINE HYDROCHLORIDE 25 MG/1
25 TABLET ORAL EVERY 4 HOURS PRN
Qty: 30 TABLET | Refills: 5 | Status: SHIPPED | OUTPATIENT
Start: 2024-01-11

## 2024-01-11 NOTE — TELEPHONE ENCOUNTER
Magnesium tested by oncology Dr. Lloyd and results pending. Will let them lead on advice about oxygen and magnesium

## 2024-01-11 NOTE — PROGRESS NOTES
I have reviewed her chart and note is made of recent bronchoscopy results finding suspicious cells from the RML.  I feel this is diagnostic of cancer and also feel that she is a greater stage then appears.  The effusion is likely to be malignant and the LLL areas are possibly cancer spread.  I feel she should be treated as a stage IV.      This being said,  care must be taken given her advanced age and overall compromised lung function given she essentially has one lobe.  I think that therapy could be started with chemo only, carbo/pemetrexed and would begin at 75% dose.  Would consider adding in pembro in the future if she does ok with this but not overall excited about IO given the lack of PDL1 positivity and low TMB.  Additionally,  if she were to get pneumonitis from this drug, this could be a fatal event.      She will visit with ZION Gibson today to discuss further as I am out with illness at this time.

## 2024-01-12 ENCOUNTER — TELEPHONE (OUTPATIENT)
Dept: HEMATOLOGY/ONCOLOGY | Facility: CLINIC | Age: 84
End: 2024-01-12

## 2024-01-12 DIAGNOSIS — C34.31 MALIGNANT NEOPLASM OF LOWER LOBE OF RIGHT LUNG: Primary | ICD-10-CM

## 2024-01-15 PROBLEM — J69.0 ASPIRATION PNEUMONIA OF RIGHT LUNG: Status: RESOLVED | Noted: 2023-10-14 | Resolved: 2024-01-15

## 2024-01-15 LAB
FUNGUS SPEC CULT: NORMAL

## 2024-01-15 PROCEDURE — G0180 MD CERTIFICATION HHA PATIENT: HCPCS | Mod: ,,, | Performed by: NURSE PRACTITIONER

## 2024-01-17 ENCOUNTER — TELEPHONE (OUTPATIENT)
Dept: PULMONOLOGY | Facility: CLINIC | Age: 84
End: 2024-01-17

## 2024-01-17 NOTE — TELEPHONE ENCOUNTER
Dr Christian/ Misti Salvador NP had put in [facility] org oxygen orders on 1/4/24 which [facility] was to take care of getting pt set up. On 1/8/24 pt had not received oxygen and pt spoke with [facility] who her insurance prefers to use and they said they had not received the orders so our office personally faxed orders to [facility].  Pts chart shows on 1/11/24 another oxygen order was put in by NP Maya Gibson and then on 1/12/24 pt was ordered home health.

## 2024-01-19 LAB
ACID FAST MOD KINY STN SPEC: NORMAL
MYCOBACTERIUM SPEC QL CULT: NORMAL

## 2024-01-20 ENCOUNTER — PATIENT MESSAGE (OUTPATIENT)
Dept: FAMILY MEDICINE | Facility: CLINIC | Age: 84
End: 2024-01-20
Payer: MEDICARE

## 2024-01-20 ENCOUNTER — PATIENT MESSAGE (OUTPATIENT)
Dept: HEMATOLOGY/ONCOLOGY | Facility: CLINIC | Age: 84
End: 2024-01-20

## 2024-01-22 ENCOUNTER — TELEPHONE (OUTPATIENT)
Dept: HEMATOLOGY/ONCOLOGY | Facility: CLINIC | Age: 84
End: 2024-01-22

## 2024-01-22 NOTE — TELEPHONE ENCOUNTER
----- Message from Debi Galeana sent at 1/22/2024 12:42 PM CST -----  Regarding: refill  Type:  RX Refill Request    Who Called: pt    Refill or New Rx:refill    RX Name and Strength:magnesium oxide (MAG-OX) 400 mg (241.3 mg magnesium) tablet - -  -   Sig - Route: Take 400 mg by mouth once daily. Patient requested refill - Oral         How is the patient currently taking it? (ex. 1XDay):see above    Is this a 30 day or 90 day RX:see above    Preferred Pharmacy with phone number:      [facility]  [address] [address]  Phone: [phone] Fax: [phone]        Local or Mail Order:Local    Ordering Provider:Angus Castillo Call Back Number:see above    Additional Information:  Please call to discuss.

## 2024-01-22 NOTE — TELEPHONE ENCOUNTER
No care due was identified.  Health [facility] Embedded Care Due Messages. Reference number: [id number].   1/22/2024 12:52:35 PM CST

## 2024-01-22 NOTE — TELEPHONE ENCOUNTER
Per Patito Gibson NP-C's verbal order, patient called and made aware that chemotherapy orders have been placed and we are currently waiting on authorization. Patient made aware of above and verbalized understanding.

## 2024-01-23 ENCOUNTER — TELEPHONE (OUTPATIENT)
Dept: HEMATOLOGY/ONCOLOGY | Facility: CLINIC | Age: 84
End: 2024-01-23

## 2024-01-23 ENCOUNTER — PATIENT MESSAGE (OUTPATIENT)
Dept: HEMATOLOGY/ONCOLOGY | Facility: CLINIC | Age: 84
End: 2024-01-23

## 2024-01-23 ENCOUNTER — OUTPATIENT CASE MANAGEMENT (OUTPATIENT)
Dept: ADMINISTRATIVE | Facility: OTHER | Age: 84
End: 2024-01-23
Payer: MEDICARE

## 2024-01-23 DIAGNOSIS — G62.0 PERIPHERAL NEUROPATHY DUE TO CHEMOTHERAPY: ICD-10-CM

## 2024-01-23 DIAGNOSIS — T45.1X5A PERIPHERAL NEUROPATHY DUE TO CHEMOTHERAPY: ICD-10-CM

## 2024-01-23 NOTE — PROGRESS NOTES
Outpatient Care Management  Plan of Care Follow Up Visit    Patient: Alexa Otero  MRN: [id number]  Date of Service: 01/23/2024  Completed by: Hui Hayes RN  Referral Date: 12/12/2023    Reason for Visit   Patient presents with    OPCM RN Follow Up Call       Brief Summary: OPCM RN followed up with Mrs. Otero today for care plan review.    Next Steps:   Mrs. Otero agreed to OPCM RN follow up on or around 01/30/24.  Message hemoc to reach out to Mrs. Otero re: spouse having recent pneumonia as noted above, nose bleeds (not active bleeding) and use of saline gel recommendation.  Follow up post 1st chemotherapy session.  PATIENT SELF MANAGEMENT PLAN:  Mrs. Otero agreed to be compliant with appointments until next follow up.

## 2024-01-23 NOTE — TELEPHONE ENCOUNTER
No care due was identified.  Health [facility] Embedded Care Due Messages. Reference number: [id number].   1/23/2024 7:00:50 AM CST

## 2024-01-23 NOTE — TELEPHONE ENCOUNTER
----- Message from JEREMIAH Hussein sent at 1/23/2024  1:26 PM CST -----  I would continue the oral magnesium. The current regimen can decrease her mag levels. We will be checking it weekly. As long as her  isnt contagious there should be no issues for her with the pneumonia and risk for infection. If he was covid or flu positive then she needs to stay away from him and wash hadns eetc.  ----- Message -----  From: Lenora Vallejo LPN  Sent: 1/23/2024   1:07 PM CST  To: JEREMIAH Hussein    Please read and advise. Thanks  ----- Message -----  From: Hui Hayes RN  Sent: 1/23/2024  12:52 PM CST  To: Ange Herman Staff    Good afternoon,    I recently spoke with Mrs. Otero for follow up on our care plans that we developed for OPCM services.  She was previously on oral Magnesium and would like to know whether she needs to restart it.  Her PCP preferred you to lead on that advice.  I see where she had her labs completed.  Please reach out to Mrs. Otero in regards to your recommendation.    In further discussion, she is scheduled for to start chemo on 1/25/24 and she reported Mr. Porter Otero, her spouse, went into the hospital overnight; diagnosis pneumonia; has been on antibiotics x 4 days; still experiencing a cough.  I reviewed infection prevention with her.  Please reach out to her re: your advisement of her  having pneumonia and her starting chemotherapy.    Thank you for your assistance.    Kind regards,    Hui Hayes, RN, BSN, CCM [facility]  [phone]    Spoke to [place] and verified that she is taking magnesium as before. She will get here labs rechecked in the next few days. As well.

## 2024-01-24 ENCOUNTER — LAB VISIT (OUTPATIENT)
Dept: LAB | Facility: HOSPITAL | Age: 84
End: 2024-01-24
Attending: NURSE PRACTITIONER
Payer: MEDICARE

## 2024-01-24 DIAGNOSIS — C34.31 MALIGNANT NEOPLASM OF LOWER LOBE OF RIGHT LUNG: ICD-10-CM

## 2024-01-24 LAB
ALBUMIN SERPL BCP-MCNC: 3.5 G/DL (ref 3.5–5.2)
ALP SERPL-CCNC: 58 U/L (ref 55–135)
ALT SERPL W/O P-5'-P-CCNC: 11 U/L (ref 10–44)
ANION GAP SERPL CALC-SCNC: 9 MMOL/L (ref 8–16)
AST SERPL-CCNC: 12 U/L (ref 10–40)
BASOPHILS # BLD AUTO: 0.05 K/UL (ref 0–0.2)
BASOPHILS NFR BLD: 0.5 % (ref 0–1.9)
BILIRUB SERPL-MCNC: 0.3 MG/DL (ref 0.1–1)
BUN SERPL-MCNC: 26 MG/DL (ref 8–23)
CALCIUM SERPL-MCNC: 9.1 MG/DL (ref 8.7–10.5)
CHLORIDE SERPL-SCNC: 102 MMOL/L (ref 95–110)
CO2 SERPL-SCNC: 28 MMOL/L (ref 23–29)
CREAT SERPL-MCNC: 1 MG/DL (ref 0.5–1.4)
DIFFERENTIAL METHOD BLD: ABNORMAL
EOSINOPHIL # BLD AUTO: 0.1 K/UL (ref 0–0.5)
EOSINOPHIL NFR BLD: 0.9 % (ref 0–8)
ERYTHROCYTE [DISTWIDTH] IN BLOOD BY AUTOMATED COUNT: 15.7 % (ref 11.5–14.5)
EST. GFR  (NO RACE VARIABLE): 56.2 ML/MIN/1.73 M^2
GLUCOSE SERPL-MCNC: 108 MG/DL (ref 70–110)
HCT VFR BLD AUTO: 36.6 % (ref 37–48.5)
HGB BLD-MCNC: 10.8 G/DL (ref 12–16)
IMM GRANULOCYTES # BLD AUTO: 0.03 K/UL (ref 0–0.04)
IMM GRANULOCYTES NFR BLD AUTO: 0.3 % (ref 0–0.5)
LYMPHOCYTES # BLD AUTO: 1.2 K/UL (ref 1–4.8)
LYMPHOCYTES NFR BLD: 11.2 % (ref 18–48)
MAGNESIUM SERPL-MCNC: 1.5 MG/DL (ref 1.6–2.6)
MCH RBC QN AUTO: 26.2 PG (ref 27–31)
MCHC RBC AUTO-ENTMCNC: 29.5 G/DL (ref 32–36)
MCV RBC AUTO: 89 FL (ref 82–98)
MONOCYTES # BLD AUTO: 0.8 K/UL (ref 0.3–1)
MONOCYTES NFR BLD: 7.7 % (ref 4–15)
NEUTROPHILS # BLD AUTO: 8.4 K/UL (ref 1.8–7.7)
NEUTROPHILS NFR BLD: 79.4 % (ref 38–73)
NRBC BLD-RTO: 0 /100 WBC
PLATELET # BLD AUTO: 333 K/UL (ref 150–450)
PMV BLD AUTO: 9.4 FL (ref 9.2–12.9)
POTASSIUM SERPL-SCNC: 4.1 MMOL/L (ref 3.5–5.1)
PROT SERPL-MCNC: 6.8 G/DL (ref 6–8.4)
RBC # BLD AUTO: 4.12 M/UL (ref 4–5.4)
SODIUM SERPL-SCNC: 139 MMOL/L (ref 136–145)
WBC # BLD AUTO: 10.51 K/UL (ref 3.9–12.7)

## 2024-01-24 PROCEDURE — 36415 COLL VENOUS BLD VENIPUNCTURE: CPT | Performed by: NURSE PRACTITIONER

## 2024-01-24 PROCEDURE — 80053 COMPREHEN METABOLIC PANEL: CPT | Performed by: NURSE PRACTITIONER

## 2024-01-24 PROCEDURE — 85025 COMPLETE CBC W/AUTO DIFF WBC: CPT | Performed by: NURSE PRACTITIONER

## 2024-01-24 PROCEDURE — 83735 ASSAY OF MAGNESIUM: CPT | Performed by: NURSE PRACTITIONER

## 2024-01-24 RX ORDER — GABAPENTIN 100 MG/1
100 CAPSULE ORAL 2 TIMES DAILY
Qty: 180 CAPSULE | Refills: 3 | Status: SHIPPED | OUTPATIENT
Start: 2024-01-24 | End: 2024-03-20 | Stop reason: SDUPTHER

## 2024-01-24 RX ORDER — HEPARIN 100 UNIT/ML
500 SYRINGE INTRAVENOUS
Status: CANCELLED | OUTPATIENT
Start: 2024-01-25

## 2024-01-24 RX ORDER — CYANOCOBALAMIN 1000 UG/ML
1000 INJECTION, SOLUTION INTRAMUSCULAR; SUBCUTANEOUS
Status: CANCELLED | OUTPATIENT
Start: 2024-01-25

## 2024-01-24 RX ORDER — SODIUM CHLORIDE 0.9 % (FLUSH) 0.9 %
10 SYRINGE (ML) INJECTION
Status: CANCELLED | OUTPATIENT
Start: 2024-01-25

## 2024-01-24 RX ORDER — LANOLIN ALCOHOL/MO/W.PET/CERES
400 CREAM (GRAM) TOPICAL DAILY
Qty: 90 TABLET | Refills: 3 | Status: SHIPPED | OUTPATIENT
Start: 2024-01-24 | End: 2024-03-20 | Stop reason: SDUPTHER

## 2024-01-25 ENCOUNTER — INFUSION (OUTPATIENT)
Dept: INFUSION THERAPY | Facility: HOSPITAL | Age: 84
End: 2024-01-25
Attending: INTERNAL MEDICINE
Payer: MEDICARE

## 2024-01-25 ENCOUNTER — DOCUMENTATION ONLY (OUTPATIENT)
Dept: HEMATOLOGY/ONCOLOGY | Facility: CLINIC | Age: 84
End: 2024-01-25

## 2024-01-25 ENCOUNTER — SOCIAL WORK (OUTPATIENT)
Dept: HEMATOLOGY/ONCOLOGY | Facility: CLINIC | Age: 84
End: 2024-01-25

## 2024-01-25 VITALS
HEART RATE: 88 BPM | OXYGEN SATURATION: 96 % | DIASTOLIC BLOOD PRESSURE: 65 MMHG | BODY MASS INDEX: 20.29 KG/M2 | TEMPERATURE: 98 F | SYSTOLIC BLOOD PRESSURE: 120 MMHG | RESPIRATION RATE: 18 BRPM | WEIGHT: 114.5 LBS | HEIGHT: 63 IN

## 2024-01-25 DIAGNOSIS — C34.31 PRIMARY MALIGNANT NEOPLASM OF BRONCHUS OF RIGHT LOWER LOBE: Primary | ICD-10-CM

## 2024-01-25 DIAGNOSIS — C56.1 CARCINOMA OF RIGHT OVARY: Primary | ICD-10-CM

## 2024-01-25 LAB
ACID FAST MOD KINY STN SPEC: NORMAL
MYCOBACTERIUM SPEC QL CULT: NORMAL

## 2024-01-25 PROCEDURE — 96372 THER/PROPH/DIAG INJ SC/IM: CPT

## 2024-01-25 PROCEDURE — A4216 STERILE WATER/SALINE, 10 ML: HCPCS | Performed by: INTERNAL MEDICINE

## 2024-01-25 PROCEDURE — 96367 TX/PROPH/DG ADDL SEQ IV INF: CPT

## 2024-01-25 PROCEDURE — 96413 CHEMO IV INFUSION 1 HR: CPT

## 2024-01-25 PROCEDURE — 96375 TX/PRO/DX INJ NEW DRUG ADDON: CPT

## 2024-01-25 PROCEDURE — 25000003 PHARM REV CODE 250: Performed by: INTERNAL MEDICINE

## 2024-01-25 PROCEDURE — 63600175 PHARM REV CODE 636 W HCPCS: Performed by: INTERNAL MEDICINE

## 2024-01-25 PROCEDURE — 96411 CHEMO IV PUSH ADDL DRUG: CPT

## 2024-01-25 RX ORDER — HEPARIN 100 UNIT/ML
500 SYRINGE INTRAVENOUS
Status: DISCONTINUED | OUTPATIENT
Start: 2024-01-25 | End: 2024-01-25 | Stop reason: HOSPADM

## 2024-01-25 RX ORDER — CYANOCOBALAMIN 1000 UG/ML
1000 INJECTION, SOLUTION INTRAMUSCULAR; SUBCUTANEOUS
Status: COMPLETED | OUTPATIENT
Start: 2024-01-25 | End: 2024-01-25

## 2024-01-25 RX ORDER — SODIUM CHLORIDE 0.9 % (FLUSH) 0.9 %
10 SYRINGE (ML) INJECTION
Status: DISCONTINUED | OUTPATIENT
Start: 2024-01-25 | End: 2024-01-25 | Stop reason: HOSPADM

## 2024-01-25 RX ADMIN — APREPITANT 130 MG: 130 INJECTION, EMULSION INTRAVENOUS at 08:01

## 2024-01-25 RX ADMIN — PEMETREXED DISODIUM 575 MG: 500 INJECTION, POWDER, LYOPHILIZED, FOR SOLUTION INTRAVENOUS at 09:01

## 2024-01-25 RX ADMIN — SODIUM CHLORIDE, PRESERVATIVE FREE 10 ML: 5 INJECTION INTRAVENOUS at 10:01

## 2024-01-25 RX ADMIN — DEXAMETHASONE SODIUM PHOSPHATE 0.25 MG: 4 INJECTION, SOLUTION INTRA-ARTICULAR; INTRALESIONAL; INTRAMUSCULAR; INTRAVENOUS; SOFT TISSUE at 08:01

## 2024-01-25 RX ADMIN — SODIUM CHLORIDE: 9 INJECTION, SOLUTION INTRAVENOUS at 08:01

## 2024-01-25 RX ADMIN — CYANOCOBALAMIN 1000 MCG: 1000 INJECTION, SOLUTION INTRAMUSCULAR at 08:01

## 2024-01-25 RX ADMIN — CARBOPLATIN 230 MG: 10 INJECTION, SOLUTION INTRAVENOUS at 09:01

## 2024-01-25 RX ADMIN — HEPARIN 500 UNITS: 100 SYRINGE at 10:01

## 2024-01-25 NOTE — NURSING
Pt and wife instructed on taking Magnesium Oxide 400mg daily as instructed by FENG Gibson NP for low Magnesium levels. Both state understanding

## 2024-01-25 NOTE — PROGRESS NOTES
Alexa Otero is a 83 year old diagnosed with ovarian and lung cancer.  I met with patient during her chemo infusion to complete new patient orientation and to complete the NCCN Distress Screening; patient indicated a rating of 2.  Patient denied needing psychosocial support at this time.  I provided patient with the [facility] community events flyer and my contact information in the event supportive services arise in the future.

## 2024-01-25 NOTE — PROGRESS NOTES
CHEMO SCHOOL- NUTRITION    Alexa JESE Otero is a 83 y.o. female with lung cancer with pmh of ovarian cancer. Pt will be receiving carboplatin and alimta. I met with Mrs. Otero for chemo school today. Pt reports decreased appetite and intake since diagnosis. She is currently eating two meals per day which usually consists of a breakfast bar  and a small dinner. She does supplement with Premier Protein daily. She is working on increasing her hydration. She denies having any nutrition related questions or concerns at the current time.     CW: 114#.     Food Insecurity:  Patient is worried whether their food would run out before they have more money to buy more: Never  The food they bought just didn't last and they didn't have money to buy more: Never      Plan:   Reviewed chemo school packet & provided copy to pt.   Discussed importance of maintaining wt & staying hydrated.   Reviewed food safety guidelines recommended during treatment.   Provided decreased appetite packet and reviewed with patient  Recommended small, frequent meals and snacks q 2-3hrs  Discussed examples of high calorie/high protein foods  Recommended switching to high calorie ONS or increasing Premier Protein shakes to BID  Provided RD contact info & encouraged pt to call with any questions/concerns.   Will f/u as needed.       Electronically signed by: Dixie Melgar MBA, EILEENN, LDN

## 2024-01-25 NOTE — PLAN OF CARE
Problem: Fall Injury Risk  Goal: Absence of Fall and Fall-Related Injury  Outcome: Ongoing, Progressing  Intervention: Identify and Manage Contributors  Flowsheets (Taken 1/25/2024 0836)  Self-Care Promotion: safe use of adaptive equipment encouraged  Intervention: Promote Injury-Free Environment  Flowsheets (Taken 1/25/2024 0836)  Safety Promotion/Fall Prevention: Fall Risk reviewed with patient/family

## 2024-01-29 ENCOUNTER — OFFICE VISIT (OUTPATIENT)
Dept: HEMATOLOGY/ONCOLOGY | Facility: CLINIC | Age: 84
End: 2024-01-29
Payer: MEDICARE

## 2024-01-29 VITALS
HEART RATE: 100 BPM | TEMPERATURE: 97 F | SYSTOLIC BLOOD PRESSURE: 130 MMHG | WEIGHT: 118.5 LBS | DIASTOLIC BLOOD PRESSURE: 63 MMHG | RESPIRATION RATE: 18 BRPM | BODY MASS INDEX: 20.99 KG/M2

## 2024-01-29 DIAGNOSIS — Z99.81 OXYGEN DEPENDENT: ICD-10-CM

## 2024-01-29 DIAGNOSIS — C34.31 PRIMARY MALIGNANT NEOPLASM OF BRONCHUS OF RIGHT LOWER LOBE: Primary | ICD-10-CM

## 2024-01-29 LAB

## 2024-01-29 PROCEDURE — 3075F SYST BP GE 130 - 139MM HG: CPT | Mod: CPTII,S$GLB,, | Performed by: INTERNAL MEDICINE

## 2024-01-29 PROCEDURE — 99214 OFFICE O/P EST MOD 30 MIN: CPT | Mod: S$GLB,,, | Performed by: INTERNAL MEDICINE

## 2024-01-29 PROCEDURE — 3078F DIAST BP <80 MM HG: CPT | Mod: CPTII,S$GLB,, | Performed by: INTERNAL MEDICINE

## 2024-01-29 PROCEDURE — 3288F FALL RISK ASSESSMENT DOCD: CPT | Mod: CPTII,S$GLB,, | Performed by: INTERNAL MEDICINE

## 2024-01-29 PROCEDURE — 1157F ADVNC CARE PLAN IN RCRD: CPT | Mod: CPTII,S$GLB,, | Performed by: INTERNAL MEDICINE

## 2024-01-29 PROCEDURE — 1159F MED LIST DOCD IN RCRD: CPT | Mod: CPTII,S$GLB,, | Performed by: INTERNAL MEDICINE

## 2024-01-29 PROCEDURE — 1126F AMNT PAIN NOTED NONE PRSNT: CPT | Mod: CPTII,S$GLB,, | Performed by: INTERNAL MEDICINE

## 2024-01-29 PROCEDURE — 1160F RVW MEDS BY RX/DR IN RCRD: CPT | Mod: CPTII,S$GLB,, | Performed by: INTERNAL MEDICINE

## 2024-01-29 PROCEDURE — 1101F PT FALLS ASSESS-DOCD LE1/YR: CPT | Mod: CPTII,S$GLB,, | Performed by: INTERNAL MEDICINE

## 2024-01-29 NOTE — PROGRESS NOTES
PROGRESS NOTE    Subjective:       Patient ID: Alexa Otero is a 84 y.o. female.    8/15/2023 PET:  IMPRESSION:  1. Multifocal right lower lobe and right pleural FDG hypermetabolism as described, with associated right lower lobe consolidation. These are nonspecific and could be of infectious or inflammatory etiology in the clinical setting of chronic pneumonia, and or metastatic disease. Correlation with previous bronchoscopy results is needed.  2. Multiple new non hypermetabolic pulmonary nodules in both lungs, which are generally below size resolution for PET, but suspicious for pulmonary metastases.  3. No additional findings of FDG avid metastatic disease.    10/31/2023 RLL Lobectomy:  LUNG SYNOPTIC REPORT   PROCEDURE:     Lobectomy.   SPECIMEN LATERALITY:    Right   TUMOR SITE:     Lower lobe.   TUMOR SIZE:     Cannot be determined, 4.5 cm area of cavitation but    tumor appears to involve most if not all of the resected lobe.   TUMOR FOCALITY:    Single tumor.   HISTOLOGIC TYPE:    Invasive mucinous adenocarcinoma with pneumonic    pattern.   VISCEROPLEURAL INVASION:   Present   LYMPHOVASCULAR INVASION:   Not identified.   SPREAD OF TUMOR THROUGH AIR SPACES:  Present   DIRECT INVASION OF ADJACENT STRUCTURES: No adjacent structures present.   MARGINS:     All margins are uninvolved by tumor, margins examined:         Bronchial, distance of invasion of tumor from closest         margin: cannot be determined.   TREATMENT EFFECT:    No known presurgical therapy.   ADDITIONAL PATHOLOGIC FINDINGS:  Organized pulmonary emboli, pulmonary    infarct.   REGIONAL LYMPH NODES:    NUMBER OF LYMPH NODES INVOLVED:  Zero, number of lymph nodes examined:     1, yue         structures examined: 10 (hilar)   PATHOLOGIC STAGE CLASSIFICATION    OF PRIMARY TUMOR:    pT3    REGIONAL LYMPH NODES:   pN0     Comment: The sections show invasive mucinous adenocarcinoma with     findings that suggest so-called pneumonic pattern. there is diffuse    involvement with tumor present in all the histologic sections, often    with a lepidic pattern suggesting spread through the air spaces. The    reported CT findings of a consolidated appearance correlates with the    histologic findings and the reported clinical presentation of a    year-long cough productive of purulent mucus. Mucus is also a common    presentation of this somewhat uncommon pulmonary malignancy. These    tumors are two complete stages pT3 when there appears to be involvement    of the entire lobe; this appears to be appropriate in this case. There    are also organizing/organized pulmonary emboli within the vasculature,    possibly indicating hypercoagulability of malignancy as can be seen    especially with mucinous tumors. The tissue sampled in block 2F is    likely an infarct related to this. The history of endometrioid    adenocarcinoma is noted, but the current tumor represents a primary    lung malignancy and is unrelated to the prior cancer.     10/13/2023-Echo:  Normal systolic function with EF 55-60%  Grade I diastolic dysfunction    12/10/2023-CT CAP:  Progression of the alveolar consolidation in the right mid and lower lung with moderate size right pleural effusion. There is progression of the airspace disease within the right upper lobe with patchy groundglass opacity left upper lobe and left lung base. Findings are compatible with multifocal.     12/15/2023-Bronchoscopy:  LUNG, RIGHT MIDDLE LOBE, BIOPSY:   - ATYPICAL ADENOMATOUS HYPERPLASIA.   - IN A BACKGROUND OF REACTIVE FIBROSIS AND FIBRIN.     Comment:   Given the patient's history, this lesion is concerning for well    differentiated lepidic type adenocarcinoma but is quantitatively    insufficient (approximately 1 mm) for a definite diagnosis.     Multiple additional leveled sections were examined.     Carbo/Pemetrexed:  Cycle 1: 1/25/2024  Cycle  2: 2/15/2024-due    Chief Complaint:  No chief complaint on file.  Stage IV Lung cancer follow up    History of Present Illness:   Alexa Otero is a 84 y.o. female who presents for follow up of lung cancer.    Ms. Otero has had one cycle of chemotherapy.  Has had some nausea and loss of appetite.        Family and Social history reviewed and is unchanged from 12/1/2023             Current Outpatient Medications:     albuterol (VENTOLIN HFA) 90 mcg/actuation inhaler, Inhale 2 puffs into the lungs every 4 (four) hours as needed for Wheezing or Shortness of Breath. Rescue, Disp: 6.7 g, Rfl: 1    amiodarone (PACERONE) 200 MG Tab, Take 1 tablet (200 mg total) by mouth 2 (two) times daily., Disp: 60 tablet, Rfl: 11    aspirin (ECOTRIN) 81 MG EC tablet, Take 81 mg by mouth once daily., Disp: , Rfl:     benazepriL (LOTENSIN) 40 MG tablet, 1/2 po qd, Disp: 45 tablet, Rfl: 3    clopidogreL (PLAVIX) 75 mg tablet, Take 1 tablet (75 mg total) by mouth once daily., Disp: , Rfl:     folic acid (FOLVITE) 1 MG tablet, Take 1 tablet (1 mg total) by mouth once daily., Disp: 100 tablet, Rfl: 3    gabapentin (NEURONTIN) 100 MG capsule, Take 1 capsule (100 mg total) by mouth 2 (two) times daily., Disp: 180 capsule, Rfl: 3    HYDROcodone-acetaminophen (NORCO) 5-325 mg per tablet, Take 1 tablet by mouth every 6 (six) hours as needed for Pain., Disp: , Rfl:     LORazepam (ATIVAN) 1 MG tablet, Take 1 tablet (1 mg total) by mouth every 6 (six) hours as needed for Anxiety (insomnia)., Disp: 30 tablet, Rfl: 2    magnesium oxide (MAG-OX) 400 mg (241.3 mg magnesium) tablet, Take 1 tablet (400 mg total) by mouth once daily. Patient requested refill, Disp: 90 tablet, Rfl: 3    melatonin 5 mg Chew, Take 1 tablet by mouth nightly as needed (insomnia)., Disp: , Rfl:     metoprolol succinate (TOPROL-XL) 25 MG 24 hr tablet, Take 25 mg by mouth once daily., Disp: , Rfl:     omeprazole (PRILOSEC) 40 MG capsule, Take 1 capsule (40 mg total) by mouth  once daily., Disp: 90 capsule, Rfl: 3    ondansetron (ZOFRAN) 8 MG tablet, Take 1 tablet (8 mg total) by mouth every 8 (eight) hours as needed., Disp: 30 tablet, Rfl: 5    potassium chloride (K-TAB) 20 mEq, Take 1 tablet by mouth once daily., Disp: , Rfl:     promethazine (PHENERGAN) 25 MG tablet, Take 1 tablet (25 mg total) by mouth every 4 (four) hours as needed for Nausea., Disp: 30 tablet, Rfl: 5    VIT A/VIT C/VIT E/ZINC/COPPER (ICAPS AREDS ORAL), Take 1 capsule by mouth 2 (two) times a day. , Disp: , Rfl:         Objective:       Physical Examination:     /63   Pulse 100   Temp 97.3 °F (36.3 °C)   Resp 18   Wt 53.8 kg (118 lb 8 oz)   BMI 20.99 kg/m²     Physical Exam  Constitutional:       Appearance: Normal appearance.   HENT:      Head: Normocephalic and atraumatic.   Eyes:      General: No scleral icterus.     Conjunctiva/sclera: Conjunctivae normal.   Cardiovascular:      Rate and Rhythm: Normal rate.   Pulmonary:      Effort: Pulmonary effort is normal.   Abdominal:      General: Abdomen is flat.   Neurological:      General: No focal deficit present.      Mental Status: She is alert and oriented to person, place, and time.   Psychiatric:         Mood and Affect: Mood normal.         Behavior: Behavior normal.         Thought Content: Thought content normal.         Judgment: Judgment normal.         Labs:   Recent Results (from the past 336 hour(s))   CBC Auto Differential    Collection Time: 01/24/24  3:35 PM   Result Value Ref Range    WBC 10.51 3.90 - 12.70 K/uL    Hemoglobin 10.8 (L) 12.0 - 16.0 g/dL    Hematocrit 36.6 (L) 37.0 - 48.5 %    Platelets 333 150 - 450 K/uL     CMP  Sodium   Date Value Ref Range Status   01/24/2024 139 136 - 145 mmol/L Final     Potassium   Date Value Ref Range Status   01/24/2024 4.1 3.5 - 5.1 mmol/L Final     Chloride   Date Value Ref Range Status   01/24/2024 102 95 - 110 mmol/L Final     CO2   Date Value Ref Range Status   01/24/2024 28 23 - 29 mmol/L Final  "    Glucose   Date Value Ref Range Status   01/24/2024 108 70 - 110 mg/dL Final     BUN   Date Value Ref Range Status   01/24/2024 26 (H) 8 - 23 mg/dL Final     Creatinine   Date Value Ref Range Status   01/24/2024 1.0 0.5 - 1.4 mg/dL Final     Calcium   Date Value Ref Range Status   01/24/2024 9.1 8.7 - 10.5 mg/dL Final     Total Protein   Date Value Ref Range Status   01/24/2024 6.8 6.0 - 8.4 g/dL Final     Albumin   Date Value Ref Range Status   01/24/2024 3.5 3.5 - 5.2 g/dL Final     Total Bilirubin   Date Value Ref Range Status   01/24/2024 0.3 0.1 - 1.0 mg/dL Final     Comment:     For infants and newborns, interpretation of results should be based  on gestational age, weight and in agreement with clinical  observations.    Premature Infant recommended reference ranges:  Up to 24 hours.............<8.0 mg/dL  Up to 48 hours............<12.0 mg/dL  3-5 days..................<15.0 mg/dL  6-29 days.................<15.0 mg/dL       Alkaline Phosphatase   Date Value Ref Range Status   01/24/2024 58 55 - 135 U/L Final     AST   Date Value Ref Range Status   01/24/2024 12 10 - 40 U/L Final     ALT   Date Value Ref Range Status   01/24/2024 11 10 - 44 U/L Final     Anion Gap   Date Value Ref Range Status   01/24/2024 9 8 - 16 mmol/L Final     eGFR if    Date Value Ref Range Status   04/28/2022 40.6 (A) >60 mL/min/1.73 m^2 Final     eGFR if non    Date Value Ref Range Status   04/28/2022 35.3 (A) >60 mL/min/1.73 m^2 Final     Comment:     Calculation used to obtain the estimated glomerular filtration  rate (eGFR) is the CKD-EPI equation.        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  Was given at reduced dose and at " this point I think I would continue this dose for cycle 2.  Can see her back with me after the next cycle to see if we can dose-adjust.  Doing well o/w.          Oxygen dependent     Patient continues on O2 and has concentrator with her today.  Will continue to monitor this closely and avoiding IO therapy for now.           Discussion:     Follow up in about 5 weeks (around 3/5/2024).      Electronically signed by Virgil Lloyd

## 2024-01-29 NOTE — ASSESSMENT & PLAN NOTE
Patient has started carbo/pemetrexed and is tolerating with expected toxicity.  Was given at reduced dose and at this point I think I would continue this dose for cycle 2.  Can see her back with me after the next cycle to see if we can dose-adjust.  Doing well o/w.

## 2024-01-29 NOTE — ASSESSMENT & PLAN NOTE
Patient continues on O2 and has concentrator with her today.  Will continue to monitor this closely and avoiding IO therapy for now.

## 2024-01-31 ENCOUNTER — LAB VISIT (OUTPATIENT)
Dept: LAB | Facility: HOSPITAL | Age: 84
End: 2024-01-31
Attending: NURSE PRACTITIONER
Payer: MEDICARE

## 2024-01-31 DIAGNOSIS — C34.31 MALIGNANT NEOPLASM OF LOWER LOBE OF RIGHT LUNG: ICD-10-CM

## 2024-01-31 LAB
ALBUMIN SERPL BCP-MCNC: 3.3 G/DL (ref 3.5–5.2)
ALP SERPL-CCNC: 41 U/L (ref 55–135)
ALT SERPL W/O P-5'-P-CCNC: 16 U/L (ref 10–44)
ANION GAP SERPL CALC-SCNC: 10 MMOL/L (ref 8–16)
AST SERPL-CCNC: 15 U/L (ref 10–40)
BASOPHILS # BLD AUTO: 0.02 K/UL (ref 0–0.2)
BASOPHILS NFR BLD: 0.5 % (ref 0–1.9)
BILIRUB SERPL-MCNC: 0.2 MG/DL (ref 0.1–1)
BUN SERPL-MCNC: 31 MG/DL (ref 8–23)
CALCIUM SERPL-MCNC: 8.8 MG/DL (ref 8.7–10.5)
CHLORIDE SERPL-SCNC: 101 MMOL/L (ref 95–110)
CO2 SERPL-SCNC: 29 MMOL/L (ref 23–29)
CREAT SERPL-MCNC: 1 MG/DL (ref 0.5–1.4)
DIFFERENTIAL METHOD BLD: ABNORMAL
EOSINOPHIL # BLD AUTO: 0.1 K/UL (ref 0–0.5)
EOSINOPHIL NFR BLD: 2.3 % (ref 0–8)
ERYTHROCYTE [DISTWIDTH] IN BLOOD BY AUTOMATED COUNT: 15.5 % (ref 11.5–14.5)
EST. GFR  (NO RACE VARIABLE): 55.9 ML/MIN/1.73 M^2
GLUCOSE SERPL-MCNC: 110 MG/DL (ref 70–110)
HCT VFR BLD AUTO: 32.4 % (ref 37–48.5)
HGB BLD-MCNC: 9.5 G/DL (ref 12–16)
IMM GRANULOCYTES # BLD AUTO: 0.04 K/UL (ref 0–0.04)
IMM GRANULOCYTES NFR BLD AUTO: 1 % (ref 0–0.5)
LYMPHOCYTES # BLD AUTO: 0.8 K/UL (ref 1–4.8)
LYMPHOCYTES NFR BLD: 20.2 % (ref 18–48)
MAGNESIUM SERPL-MCNC: 1.8 MG/DL (ref 1.6–2.6)
MCH RBC QN AUTO: 26.2 PG (ref 27–31)
MCHC RBC AUTO-ENTMCNC: 29.3 G/DL (ref 32–36)
MCV RBC AUTO: 89 FL (ref 82–98)
MONOCYTES # BLD AUTO: 0.2 K/UL (ref 0.3–1)
MONOCYTES NFR BLD: 3.8 % (ref 4–15)
NEUTROPHILS # BLD AUTO: 2.8 K/UL (ref 1.8–7.7)
NEUTROPHILS NFR BLD: 72.2 % (ref 38–73)
NRBC BLD-RTO: 0 /100 WBC
PLATELET # BLD AUTO: 218 K/UL (ref 150–450)
PMV BLD AUTO: 9.5 FL (ref 9.2–12.9)
POTASSIUM SERPL-SCNC: 4.2 MMOL/L (ref 3.5–5.1)
PROT SERPL-MCNC: 6.3 G/DL (ref 6–8.4)
RBC # BLD AUTO: 3.63 M/UL (ref 4–5.4)
SODIUM SERPL-SCNC: 140 MMOL/L (ref 136–145)
WBC # BLD AUTO: 3.91 K/UL (ref 3.9–12.7)

## 2024-01-31 PROCEDURE — 80053 COMPREHEN METABOLIC PANEL: CPT | Performed by: NURSE PRACTITIONER

## 2024-01-31 PROCEDURE — 83735 ASSAY OF MAGNESIUM: CPT | Performed by: NURSE PRACTITIONER

## 2024-01-31 PROCEDURE — 36415 COLL VENOUS BLD VENIPUNCTURE: CPT | Performed by: NURSE PRACTITIONER

## 2024-01-31 PROCEDURE — 85025 COMPLETE CBC W/AUTO DIFF WBC: CPT | Performed by: NURSE PRACTITIONER

## 2024-02-02 ENCOUNTER — TELEPHONE (OUTPATIENT)
Dept: FAMILY MEDICINE | Facility: CLINIC | Age: 84
End: 2024-02-02
Payer: MEDICARE

## 2024-02-02 ENCOUNTER — OUTPATIENT CASE MANAGEMENT (OUTPATIENT)
Dept: ADMINISTRATIVE | Facility: OTHER | Age: 84
End: 2024-02-02
Payer: MEDICARE

## 2024-02-02 DIAGNOSIS — I25.10 CORONARY ARTERY DISEASE, UNSPECIFIED VESSEL OR LESION TYPE, UNSPECIFIED WHETHER ANGINA PRESENT, UNSPECIFIED WHETHER NATIVE OR TRANSPLANTED HEART: Primary | ICD-10-CM

## 2024-02-02 DIAGNOSIS — Z95.820 S/P ANGIOPLASTY WITH STENT: ICD-10-CM

## 2024-02-02 NOTE — TELEPHONE ENCOUNTER
"----- Message from Hui Hayes RN sent at 2/2/2024 12:56 PM CST -----  Good afternoon,    I recently spoke with Mrs. Otero for an OPCM RN follow up call and she voiced she is doing "pretty good". Please see the following update:     - Compliant with her medication regimen.   - Hemoc visit, went through chemo school, met with the dietician and SW.   - First chemo was on 1/25/24 and voiced she developed some nausea a few days later but since has resolved.     - Next chemo is 2/15/24.     - Will follow up with hemoc in March after her cycle.   - Continues to wear oxygen.   - Visited Dr. Julio, Cardiologist, on 1/31/24.  She informed him she would like to see a cardio in [place] as his office is a bit far for her.  He did not have a recommendation for her.  She would like to stay in the [facility] organization.    Please reach out to Mrs. Otero and provide a recommendation for an [facility]ologist in [place] to establish care.    Kind regards,    Hui Hayes, RN, BSN, CCM [facility]  [phone]      "

## 2024-02-02 NOTE — PROGRESS NOTES
Outpatient Care Management  Plan of Care Follow Up Visit    Patient: Alexa Otero  MRN: [id number]  Date of Service: 02/02/2024  Completed by: Hui Hayes RN  Referral Date: 12/12/2023    Reason for Visit   Patient presents with    OPCM RN Follow Up Call       Brief Summary: OPCM RN followed up with Mrs. Liu today for care plan review.    Next Steps:   Mrs. Liu agreed to OPCM RN follow up on or around 02/15/26.  Follow up with respiratory status; sob.  Message PCP with update and cardio recommendation for [facility].  PATIENT SELF MANAGEMENT PLAN:  Mrs. Liu agreed to obtain saline gel or spray by next follow up call to assist with reduction in nose bleeds.

## 2024-02-08 ENCOUNTER — LAB VISIT (OUTPATIENT)
Dept: LAB | Facility: HOSPITAL | Age: 84
End: 2024-02-08
Attending: NURSE PRACTITIONER
Payer: MEDICARE

## 2024-02-08 DIAGNOSIS — C34.31 MALIGNANT NEOPLASM OF LOWER LOBE OF RIGHT LUNG: ICD-10-CM

## 2024-02-08 LAB
ALBUMIN SERPL BCP-MCNC: 3.2 G/DL (ref 3.5–5.2)
ALP SERPL-CCNC: 46 U/L (ref 55–135)
ALT SERPL W/O P-5'-P-CCNC: 12 U/L (ref 10–44)
ANION GAP SERPL CALC-SCNC: 9 MMOL/L (ref 8–16)
AST SERPL-CCNC: 10 U/L (ref 10–40)
BASOPHILS # BLD AUTO: 0 K/UL (ref 0–0.2)
BASOPHILS NFR BLD: 0 % (ref 0–1.9)
BILIRUB SERPL-MCNC: 0.3 MG/DL (ref 0.1–1)
BUN SERPL-MCNC: 23 MG/DL (ref 8–23)
CALCIUM SERPL-MCNC: 9 MG/DL (ref 8.7–10.5)
CHLORIDE SERPL-SCNC: 104 MMOL/L (ref 95–110)
CO2 SERPL-SCNC: 28 MMOL/L (ref 23–29)
CREAT SERPL-MCNC: 1.1 MG/DL (ref 0.5–1.4)
DIFFERENTIAL METHOD BLD: ABNORMAL
EOSINOPHIL # BLD AUTO: 0 K/UL (ref 0–0.5)
EOSINOPHIL NFR BLD: 0.8 % (ref 0–8)
ERYTHROCYTE [DISTWIDTH] IN BLOOD BY AUTOMATED COUNT: 15.8 % (ref 11.5–14.5)
EST. GFR  (NO RACE VARIABLE): 49.9 ML/MIN/1.73 M^2
GLUCOSE SERPL-MCNC: 133 MG/DL (ref 70–110)
HCT VFR BLD AUTO: 31.5 % (ref 37–48.5)
HGB BLD-MCNC: 9.3 G/DL (ref 12–16)
IMM GRANULOCYTES # BLD AUTO: 0.01 K/UL (ref 0–0.04)
IMM GRANULOCYTES NFR BLD AUTO: 0.3 % (ref 0–0.5)
LYMPHOCYTES # BLD AUTO: 0.5 K/UL (ref 1–4.8)
LYMPHOCYTES NFR BLD: 14.5 % (ref 18–48)
MAGNESIUM SERPL-MCNC: 1.8 MG/DL (ref 1.6–2.6)
MCH RBC QN AUTO: 26.4 PG (ref 27–31)
MCHC RBC AUTO-ENTMCNC: 29.5 G/DL (ref 32–36)
MCV RBC AUTO: 90 FL (ref 82–98)
MONOCYTES # BLD AUTO: 0.5 K/UL (ref 0.3–1)
MONOCYTES NFR BLD: 13.1 % (ref 4–15)
NEUTROPHILS # BLD AUTO: 2.6 K/UL (ref 1.8–7.7)
NEUTROPHILS NFR BLD: 71.3 % (ref 38–73)
NRBC BLD-RTO: 0 /100 WBC
PLATELET # BLD AUTO: 105 K/UL (ref 150–450)
PMV BLD AUTO: 10.2 FL (ref 9.2–12.9)
POTASSIUM SERPL-SCNC: 4.2 MMOL/L (ref 3.5–5.1)
PROT SERPL-MCNC: 6.7 G/DL (ref 6–8.4)
RBC # BLD AUTO: 3.52 M/UL (ref 4–5.4)
SODIUM SERPL-SCNC: 141 MMOL/L (ref 136–145)
WBC # BLD AUTO: 3.66 K/UL (ref 3.9–12.7)

## 2024-02-08 PROCEDURE — 85025 COMPLETE CBC W/AUTO DIFF WBC: CPT | Performed by: NURSE PRACTITIONER

## 2024-02-08 PROCEDURE — 80053 COMPREHEN METABOLIC PANEL: CPT | Performed by: NURSE PRACTITIONER

## 2024-02-08 PROCEDURE — 83735 ASSAY OF MAGNESIUM: CPT | Performed by: NURSE PRACTITIONER

## 2024-02-08 PROCEDURE — 36415 COLL VENOUS BLD VENIPUNCTURE: CPT | Performed by: NURSE PRACTITIONER

## 2024-02-12 ENCOUNTER — TELEPHONE (OUTPATIENT)
Dept: FAMILY MEDICINE | Facility: CLINIC | Age: 84
End: 2024-02-12
Payer: MEDICARE

## 2024-02-12 NOTE — TELEPHONE ENCOUNTER
Called patient to schedule clinic follow up due to recent fall. No answer, left voicemail to return call.    No

## 2024-02-12 NOTE — TELEPHONE ENCOUNTER
----- Message from Vijay Pereira sent at 2/12/2024 12:18 PM CST -----  Type: Needs Medical Advice  Who Called:  Saray/ [facility]   Symptoms (please be specific):  Left shoulder pain, chipped tooth and bruised lip from fall  How long has patient had these symptoms:  Pt fell on 02/09/247    Best Call Back Number: [phone]  Additional Information:

## 2024-02-14 ENCOUNTER — LAB VISIT (OUTPATIENT)
Dept: LAB | Facility: HOSPITAL | Age: 84
End: 2024-02-14
Attending: NURSE PRACTITIONER
Payer: MEDICARE

## 2024-02-14 DIAGNOSIS — C34.31 MALIGNANT NEOPLASM OF LOWER LOBE OF RIGHT LUNG: ICD-10-CM

## 2024-02-14 LAB
ALBUMIN SERPL BCP-MCNC: 3.3 G/DL (ref 3.5–5.2)
ALP SERPL-CCNC: 48 U/L (ref 55–135)
ALT SERPL W/O P-5'-P-CCNC: 10 U/L (ref 10–44)
ANION GAP SERPL CALC-SCNC: 12 MMOL/L (ref 8–16)
AST SERPL-CCNC: 12 U/L (ref 10–40)
BASOPHILS # BLD AUTO: 0.02 K/UL (ref 0–0.2)
BASOPHILS NFR BLD: 0.4 % (ref 0–1.9)
BILIRUB SERPL-MCNC: 0.4 MG/DL (ref 0.1–1)
BUN SERPL-MCNC: 18 MG/DL (ref 8–23)
CALCIUM SERPL-MCNC: 9.4 MG/DL (ref 8.7–10.5)
CHLORIDE SERPL-SCNC: 101 MMOL/L (ref 95–110)
CO2 SERPL-SCNC: 26 MMOL/L (ref 23–29)
CREAT SERPL-MCNC: 1 MG/DL (ref 0.5–1.4)
DIFFERENTIAL METHOD BLD: ABNORMAL
EOSINOPHIL # BLD AUTO: 0.1 K/UL (ref 0–0.5)
EOSINOPHIL NFR BLD: 1.1 % (ref 0–8)
ERYTHROCYTE [DISTWIDTH] IN BLOOD BY AUTOMATED COUNT: 16.8 % (ref 11.5–14.5)
EST. GFR  (NO RACE VARIABLE): 55.9 ML/MIN/1.73 M^2
GLUCOSE SERPL-MCNC: 112 MG/DL (ref 70–110)
HCT VFR BLD AUTO: 28.9 % (ref 37–48.5)
HGB BLD-MCNC: 8.7 G/DL (ref 12–16)
IMM GRANULOCYTES # BLD AUTO: 0.03 K/UL (ref 0–0.04)
IMM GRANULOCYTES NFR BLD AUTO: 0.6 % (ref 0–0.5)
LYMPHOCYTES # BLD AUTO: 0.6 K/UL (ref 1–4.8)
LYMPHOCYTES NFR BLD: 11.5 % (ref 18–48)
MAGNESIUM SERPL-MCNC: 1.7 MG/DL (ref 1.6–2.6)
MCH RBC QN AUTO: 26.2 PG (ref 27–31)
MCHC RBC AUTO-ENTMCNC: 30.1 G/DL (ref 32–36)
MCV RBC AUTO: 87 FL (ref 82–98)
MONOCYTES # BLD AUTO: 0.9 K/UL (ref 0.3–1)
MONOCYTES NFR BLD: 16.3 % (ref 4–15)
NEUTROPHILS # BLD AUTO: 3.7 K/UL (ref 1.8–7.7)
NEUTROPHILS NFR BLD: 70.1 % (ref 38–73)
NRBC BLD-RTO: 0 /100 WBC
PLATELET # BLD AUTO: 379 K/UL (ref 150–450)
PLATELET BLD QL SMEAR: ABNORMAL
PMV BLD AUTO: 8.9 FL (ref 9.2–12.9)
POTASSIUM SERPL-SCNC: 4.1 MMOL/L (ref 3.5–5.1)
PROT SERPL-MCNC: 6.3 G/DL (ref 6–8.4)
RBC # BLD AUTO: 3.32 M/UL (ref 4–5.4)
SODIUM SERPL-SCNC: 139 MMOL/L (ref 136–145)
WBC # BLD AUTO: 5.29 K/UL (ref 3.9–12.7)

## 2024-02-14 PROCEDURE — 85025 COMPLETE CBC W/AUTO DIFF WBC: CPT | Performed by: NURSE PRACTITIONER

## 2024-02-14 PROCEDURE — 80053 COMPREHEN METABOLIC PANEL: CPT | Performed by: NURSE PRACTITIONER

## 2024-02-14 PROCEDURE — 83735 ASSAY OF MAGNESIUM: CPT | Performed by: NURSE PRACTITIONER

## 2024-02-14 RX ORDER — HEPARIN 100 UNIT/ML
500 SYRINGE INTRAVENOUS
Status: CANCELLED | OUTPATIENT
Start: 2024-02-15

## 2024-02-14 RX ORDER — SODIUM CHLORIDE 0.9 % (FLUSH) 0.9 %
10 SYRINGE (ML) INJECTION
Status: CANCELLED | OUTPATIENT
Start: 2024-02-15

## 2024-02-15 ENCOUNTER — INFUSION (OUTPATIENT)
Dept: INFUSION THERAPY | Facility: HOSPITAL | Age: 84
End: 2024-02-15
Attending: INTERNAL MEDICINE
Payer: MEDICARE

## 2024-02-15 VITALS
WEIGHT: 112.63 LBS | DIASTOLIC BLOOD PRESSURE: 68 MMHG | HEIGHT: 63 IN | HEART RATE: 82 BPM | SYSTOLIC BLOOD PRESSURE: 116 MMHG | RESPIRATION RATE: 18 BRPM | BODY MASS INDEX: 19.96 KG/M2 | TEMPERATURE: 97 F

## 2024-02-15 DIAGNOSIS — C34.31 PRIMARY MALIGNANT NEOPLASM OF BRONCHUS OF RIGHT LOWER LOBE: Primary | ICD-10-CM

## 2024-02-15 PROCEDURE — 63600175 PHARM REV CODE 636 W HCPCS: Performed by: INTERNAL MEDICINE

## 2024-02-15 PROCEDURE — 96367 TX/PROPH/DG ADDL SEQ IV INF: CPT

## 2024-02-15 PROCEDURE — A4216 STERILE WATER/SALINE, 10 ML: HCPCS | Performed by: INTERNAL MEDICINE

## 2024-02-15 PROCEDURE — 96417 CHEMO IV INFUS EACH ADDL SEQ: CPT

## 2024-02-15 PROCEDURE — 96375 TX/PRO/DX INJ NEW DRUG ADDON: CPT

## 2024-02-15 PROCEDURE — 96413 CHEMO IV INFUSION 1 HR: CPT

## 2024-02-15 PROCEDURE — 25000003 PHARM REV CODE 250: Performed by: INTERNAL MEDICINE

## 2024-02-15 RX ORDER — HEPARIN 100 UNIT/ML
500 SYRINGE INTRAVENOUS
Status: DISCONTINUED | OUTPATIENT
Start: 2024-02-15 | End: 2024-02-15 | Stop reason: HOSPADM

## 2024-02-15 RX ORDER — SODIUM CHLORIDE 0.9 % (FLUSH) 0.9 %
10 SYRINGE (ML) INJECTION
Status: DISCONTINUED | OUTPATIENT
Start: 2024-02-15 | End: 2024-02-15 | Stop reason: HOSPADM

## 2024-02-15 RX ADMIN — HEPARIN 500 UNITS: 100 SYRINGE at 10:02

## 2024-02-15 RX ADMIN — PALONOSETRON HYDROCHLORIDE 0.25 MG: 0.25 INJECTION, SOLUTION INTRAVENOUS at 08:02

## 2024-02-15 RX ADMIN — SODIUM CHLORIDE: 9 INJECTION, SOLUTION INTRAVENOUS at 08:02

## 2024-02-15 RX ADMIN — PEMETREXED 575 MG: 100 INJECTION, POWDER, LYOPHILIZED, FOR SOLUTION INTRAVENOUS at 09:02

## 2024-02-15 RX ADMIN — CARBOPLATIN 300 MG: 10 INJECTION, SOLUTION INTRAVENOUS at 09:02

## 2024-02-15 RX ADMIN — Medication 10 ML: at 10:02

## 2024-02-15 RX ADMIN — APREPITANT 130 MG: 130 INJECTION, EMULSION INTRAVENOUS at 08:02

## 2024-02-15 NOTE — PLAN OF CARE
Problem: Fatigue  Goal: Improved Activity Tolerance  2/15/2024 0846 by Trang Caceres, RN  Outcome: Met  2/15/2024 0845 by Trang Caceres, RN  Outcome: Ongoing, Progressing

## 2024-02-16 ENCOUNTER — EXTERNAL HOME HEALTH (OUTPATIENT)
Dept: HOME HEALTH SERVICES | Facility: HOSPITAL | Age: 84
End: 2024-02-16
Payer: MEDICARE

## 2024-02-19 ENCOUNTER — OUTPATIENT CASE MANAGEMENT (OUTPATIENT)
Dept: ADMINISTRATIVE | Facility: OTHER | Age: 84
End: 2024-02-19
Payer: MEDICARE

## 2024-02-19 NOTE — PROGRESS NOTES
Outpatient Care Management  Plan of Care Follow Up Visit    Patient: Alexa Otero  MRN: [id number]  Date of Service: 02/19/2024  Completed by: Hui Hayes RN  Referral Date: 12/12/2023    Reason for Visit   Patient presents with    OPCM RN Follow Up Call       Brief Summary: OPCM RN followed up with Mrs. Liu today for care plan review.    Next Steps:   Mrs. Liu agreed to OPCM RN follow up on or around 03/01/24.  Message hemoc re: dietary consult/nutritional needs; weight loss.  PATIENT SELF MANAGEMENT PLAN:  Mrs. Liu agreed for OPCM RN to reach out to hemoc re: nutrition needs.

## 2024-02-21 ENCOUNTER — LAB VISIT (OUTPATIENT)
Dept: LAB | Facility: HOSPITAL | Age: 84
End: 2024-02-21
Attending: NURSE PRACTITIONER
Payer: MEDICARE

## 2024-02-21 ENCOUNTER — TELEPHONE (OUTPATIENT)
Dept: HEMATOLOGY/ONCOLOGY | Facility: CLINIC | Age: 84
End: 2024-02-21

## 2024-02-21 DIAGNOSIS — C34.31 MALIGNANT NEOPLASM OF LOWER LOBE OF RIGHT LUNG: ICD-10-CM

## 2024-02-21 LAB
ALBUMIN SERPL BCP-MCNC: 3.3 G/DL (ref 3.5–5.2)
ALP SERPL-CCNC: 35 U/L (ref 55–135)
ALT SERPL W/O P-5'-P-CCNC: 10 U/L (ref 10–44)
ANION GAP SERPL CALC-SCNC: 8 MMOL/L (ref 8–16)
AST SERPL-CCNC: 12 U/L (ref 10–40)
BASOPHILS NFR BLD: 0 % (ref 0–1.9)
BILIRUB SERPL-MCNC: 0.3 MG/DL (ref 0.1–1)
BUN SERPL-MCNC: 30 MG/DL (ref 8–23)
BURR CELLS BLD QL SMEAR: ABNORMAL
CALCIUM SERPL-MCNC: 9 MG/DL (ref 8.7–10.5)
CHLORIDE SERPL-SCNC: 101 MMOL/L (ref 95–110)
CO2 SERPL-SCNC: 30 MMOL/L (ref 23–29)
CREAT SERPL-MCNC: 1 MG/DL (ref 0.5–1.4)
DACRYOCYTES BLD QL SMEAR: ABNORMAL
DIFFERENTIAL METHOD BLD: ABNORMAL
EOSINOPHIL NFR BLD: 0 % (ref 0–8)
ERYTHROCYTE [DISTWIDTH] IN BLOOD BY AUTOMATED COUNT: 17.2 % (ref 11.5–14.5)
EST. GFR  (NO RACE VARIABLE): 55.9 ML/MIN/1.73 M^2
GLUCOSE SERPL-MCNC: 118 MG/DL (ref 70–110)
HCT VFR BLD AUTO: 28.2 % (ref 37–48.5)
HGB BLD-MCNC: 8.6 G/DL (ref 12–16)
IMM GRANULOCYTES # BLD AUTO: ABNORMAL K/UL (ref 0–0.04)
IMM GRANULOCYTES NFR BLD AUTO: ABNORMAL % (ref 0–0.5)
LYMPHOCYTES NFR BLD: 24 % (ref 18–48)
MAGNESIUM SERPL-MCNC: 1.4 MG/DL (ref 1.6–2.6)
MCH RBC QN AUTO: 26.2 PG (ref 27–31)
MCHC RBC AUTO-ENTMCNC: 30.5 G/DL (ref 32–36)
MCV RBC AUTO: 86 FL (ref 82–98)
MONOCYTES NFR BLD: 0 % (ref 4–15)
NEUTROPHILS NFR BLD: 76 % (ref 38–73)
NRBC BLD-RTO: 0 /100 WBC
OVALOCYTES BLD QL SMEAR: ABNORMAL
PLATELET # BLD AUTO: 277 K/UL (ref 150–450)
PLATELET BLD QL SMEAR: ABNORMAL
PMV BLD AUTO: 9.2 FL (ref 9.2–12.9)
POIKILOCYTOSIS BLD QL SMEAR: ABNORMAL
POTASSIUM SERPL-SCNC: 4.1 MMOL/L (ref 3.5–5.1)
PROT SERPL-MCNC: 6.6 G/DL (ref 6–8.4)
RBC # BLD AUTO: 3.28 M/UL (ref 4–5.4)
SODIUM SERPL-SCNC: 139 MMOL/L (ref 136–145)
WBC # BLD AUTO: 1.93 K/UL (ref 3.9–12.7)

## 2024-02-21 PROCEDURE — 80053 COMPREHEN METABOLIC PANEL: CPT | Performed by: NURSE PRACTITIONER

## 2024-02-21 PROCEDURE — 36415 COLL VENOUS BLD VENIPUNCTURE: CPT | Performed by: NURSE PRACTITIONER

## 2024-02-21 PROCEDURE — 83735 ASSAY OF MAGNESIUM: CPT | Performed by: NURSE PRACTITIONER

## 2024-02-21 PROCEDURE — 85027 COMPLETE CBC AUTOMATED: CPT | Performed by: NURSE PRACTITIONER

## 2024-02-21 PROCEDURE — 85007 BL SMEAR W/DIFF WBC COUNT: CPT | Performed by: NURSE PRACTITIONER

## 2024-02-21 NOTE — TELEPHONE ENCOUNTER
Patito Gibson, ZION-C also reviewed patient's magnesium level and per her verbal order, patient to start taking magnesium twice daily and to let the office know if she is unable to tolerate the medication. Patient made aware of above and of previous note and verbalized understanding.

## 2024-02-21 NOTE — TELEPHONE ENCOUNTER
Critical WBC of 1.93 called from Joyce at [facility]. JEREMIAH Hussein and per her verbal order patient to get labs rechecked next week. Attempted to make patient aware of above. No answer. Voicemail left with instructions to call back.

## 2024-02-22 ENCOUNTER — TELEPHONE (OUTPATIENT)
Dept: FAMILY MEDICINE | Facility: CLINIC | Age: 84
End: 2024-02-22
Payer: MEDICARE

## 2024-02-22 DIAGNOSIS — G89.29 CHRONIC LOW BACK PAIN, UNSPECIFIED BACK PAIN LATERALITY, UNSPECIFIED WHETHER SCIATICA PRESENT: Primary | ICD-10-CM

## 2024-02-22 DIAGNOSIS — M54.50 CHRONIC LOW BACK PAIN, UNSPECIFIED BACK PAIN LATERALITY, UNSPECIFIED WHETHER SCIATICA PRESENT: Primary | ICD-10-CM

## 2024-02-22 DIAGNOSIS — R26.81 GAIT INSTABILITY: ICD-10-CM

## 2024-02-23 ENCOUNTER — LAB VISIT (OUTPATIENT)
Dept: LAB | Facility: HOSPITAL | Age: 84
End: 2024-02-23
Attending: NURSE PRACTITIONER
Payer: MEDICARE

## 2024-02-23 ENCOUNTER — DOCUMENT SCAN (OUTPATIENT)
Dept: HOME HEALTH SERVICES | Facility: HOSPITAL | Age: 84
End: 2024-02-23
Payer: MEDICARE

## 2024-02-23 DIAGNOSIS — C34.31 MALIGNANT NEOPLASM OF LOWER LOBE OF RIGHT LUNG: ICD-10-CM

## 2024-02-23 LAB
ALBUMIN SERPL BCP-MCNC: 3.3 G/DL (ref 3.5–5.2)
ALP SERPL-CCNC: 38 U/L (ref 55–135)
ALT SERPL W/O P-5'-P-CCNC: 16 U/L (ref 10–44)
ANION GAP SERPL CALC-SCNC: 8 MMOL/L (ref 8–16)
AST SERPL-CCNC: 15 U/L (ref 10–40)
BASOPHILS # BLD AUTO: 0.01 K/UL (ref 0–0.2)
BASOPHILS NFR BLD: 0.5 % (ref 0–1.9)
BILIRUB SERPL-MCNC: 0.3 MG/DL (ref 0.1–1)
BUN SERPL-MCNC: 31 MG/DL (ref 8–23)
CALCIUM SERPL-MCNC: 8.9 MG/DL (ref 8.7–10.5)
CHLORIDE SERPL-SCNC: 101 MMOL/L (ref 95–110)
CO2 SERPL-SCNC: 29 MMOL/L (ref 23–29)
CREAT SERPL-MCNC: 1.1 MG/DL (ref 0.5–1.4)
DIFFERENTIAL METHOD BLD: ABNORMAL
EOSINOPHIL # BLD AUTO: 0 K/UL (ref 0–0.5)
EOSINOPHIL NFR BLD: 0 % (ref 0–8)
ERYTHROCYTE [DISTWIDTH] IN BLOOD BY AUTOMATED COUNT: 17 % (ref 11.5–14.5)
EST. GFR  (NO RACE VARIABLE): 49.9 ML/MIN/1.73 M^2
GLUCOSE SERPL-MCNC: 113 MG/DL (ref 70–110)
HCT VFR BLD AUTO: 28.6 % (ref 37–48.5)
HGB BLD-MCNC: 8.5 G/DL (ref 12–16)
IMM GRANULOCYTES # BLD AUTO: 0.02 K/UL (ref 0–0.04)
IMM GRANULOCYTES NFR BLD AUTO: 1 % (ref 0–0.5)
LYMPHOCYTES # BLD AUTO: 0.7 K/UL (ref 1–4.8)
LYMPHOCYTES NFR BLD: 34.3 % (ref 18–48)
MAGNESIUM SERPL-MCNC: 1.4 MG/DL (ref 1.6–2.6)
MCH RBC QN AUTO: 26.5 PG (ref 27–31)
MCHC RBC AUTO-ENTMCNC: 29.7 G/DL (ref 32–36)
MCV RBC AUTO: 89 FL (ref 82–98)
MONOCYTES # BLD AUTO: 0.4 K/UL (ref 0.3–1)
MONOCYTES NFR BLD: 17.6 % (ref 4–15)
NEUTROPHILS # BLD AUTO: 1 K/UL (ref 1.8–7.7)
NEUTROPHILS NFR BLD: 46.6 % (ref 38–73)
NRBC BLD-RTO: 0 /100 WBC
PLATELET # BLD AUTO: 193 K/UL (ref 150–450)
PMV BLD AUTO: 9.5 FL (ref 9.2–12.9)
POTASSIUM SERPL-SCNC: 4.4 MMOL/L (ref 3.5–5.1)
PROT SERPL-MCNC: 6.7 G/DL (ref 6–8.4)
RBC # BLD AUTO: 3.21 M/UL (ref 4–5.4)
SODIUM SERPL-SCNC: 138 MMOL/L (ref 136–145)
WBC # BLD AUTO: 2.1 K/UL (ref 3.9–12.7)

## 2024-02-23 PROCEDURE — 85025 COMPLETE CBC W/AUTO DIFF WBC: CPT | Performed by: NURSE PRACTITIONER

## 2024-02-23 PROCEDURE — 36415 COLL VENOUS BLD VENIPUNCTURE: CPT | Performed by: NURSE PRACTITIONER

## 2024-02-23 PROCEDURE — 83735 ASSAY OF MAGNESIUM: CPT | Performed by: NURSE PRACTITIONER

## 2024-02-23 PROCEDURE — 80053 COMPREHEN METABOLIC PANEL: CPT | Performed by: NURSE PRACTITIONER

## 2024-02-23 NOTE — TELEPHONE ENCOUNTER
[facility]

## 2024-02-28 ENCOUNTER — CLINICAL SUPPORT (OUTPATIENT)
Dept: HEMATOLOGY/ONCOLOGY | Facility: CLINIC | Age: 84
End: 2024-02-28
Payer: MEDICARE

## 2024-02-28 ENCOUNTER — LAB VISIT (OUTPATIENT)
Dept: LAB | Facility: HOSPITAL | Age: 84
End: 2024-02-28
Attending: NURSE PRACTITIONER
Payer: MEDICARE

## 2024-02-28 ENCOUNTER — TELEPHONE (OUTPATIENT)
Dept: HEMATOLOGY/ONCOLOGY | Facility: CLINIC | Age: 84
End: 2024-02-28

## 2024-02-28 VITALS — BODY MASS INDEX: 20.19 KG/M2 | WEIGHT: 114 LBS

## 2024-02-28 DIAGNOSIS — D64.9 SYMPTOMATIC ANEMIA: Primary | ICD-10-CM

## 2024-02-28 DIAGNOSIS — C34.31 PRIMARY MALIGNANT NEOPLASM OF BRONCHUS OF RIGHT LOWER LOBE: Primary | ICD-10-CM

## 2024-02-28 DIAGNOSIS — C34.31 MALIGNANT NEOPLASM OF LOWER LOBE OF RIGHT LUNG: ICD-10-CM

## 2024-02-28 LAB
ALBUMIN SERPL BCP-MCNC: 3.2 G/DL (ref 3.5–5.2)
ALP SERPL-CCNC: 36 U/L (ref 55–135)
ALT SERPL W/O P-5'-P-CCNC: 12 U/L (ref 10–44)
ANION GAP SERPL CALC-SCNC: 9 MMOL/L (ref 8–16)
ANISOCYTOSIS BLD QL SMEAR: SLIGHT
AST SERPL-CCNC: 9 U/L (ref 10–40)
BASOPHILS # BLD AUTO: 0.01 K/UL (ref 0–0.2)
BASOPHILS NFR BLD: 0.3 % (ref 0–1.9)
BILIRUB SERPL-MCNC: 0.2 MG/DL (ref 0.1–1)
BUN SERPL-MCNC: 25 MG/DL (ref 8–23)
CALCIUM SERPL-MCNC: 8.9 MG/DL (ref 8.7–10.5)
CHLORIDE SERPL-SCNC: 102 MMOL/L (ref 95–110)
CO2 SERPL-SCNC: 28 MMOL/L (ref 23–29)
CREAT SERPL-MCNC: 1.1 MG/DL (ref 0.5–1.4)
DACRYOCYTES BLD QL SMEAR: ABNORMAL
DIFFERENTIAL METHOD BLD: ABNORMAL
EOSINOPHIL # BLD AUTO: 0 K/UL (ref 0–0.5)
EOSINOPHIL NFR BLD: 0 % (ref 0–8)
ERYTHROCYTE [DISTWIDTH] IN BLOOD BY AUTOMATED COUNT: 16.8 % (ref 11.5–14.5)
EST. GFR  (NO RACE VARIABLE): 49.9 ML/MIN/1.73 M^2
GLUCOSE SERPL-MCNC: 130 MG/DL (ref 70–110)
HCT VFR BLD AUTO: 26 % (ref 37–48.5)
HGB BLD-MCNC: 7.6 G/DL (ref 12–16)
IMM GRANULOCYTES # BLD AUTO: 0.01 K/UL (ref 0–0.04)
IMM GRANULOCYTES NFR BLD AUTO: 0.3 % (ref 0–0.5)
LYMPHOCYTES # BLD AUTO: 0.7 K/UL (ref 1–4.8)
LYMPHOCYTES NFR BLD: 22 % (ref 18–48)
MAGNESIUM SERPL-MCNC: 1.6 MG/DL (ref 1.6–2.6)
MCH RBC QN AUTO: 26.3 PG (ref 27–31)
MCHC RBC AUTO-ENTMCNC: 29.2 G/DL (ref 32–36)
MCV RBC AUTO: 90 FL (ref 82–98)
MONOCYTES # BLD AUTO: 0.5 K/UL (ref 0.3–1)
MONOCYTES NFR BLD: 15.2 % (ref 4–15)
NEUTROPHILS # BLD AUTO: 2 K/UL (ref 1.8–7.7)
NEUTROPHILS NFR BLD: 62.2 % (ref 38–73)
NRBC BLD-RTO: 0 /100 WBC
OVALOCYTES BLD QL SMEAR: ABNORMAL
PLATELET # BLD AUTO: 58 K/UL (ref 150–450)
PLATELET BLD QL SMEAR: ABNORMAL
PMV BLD AUTO: 10.8 FL (ref 9.2–12.9)
POIKILOCYTOSIS BLD QL SMEAR: ABNORMAL
POTASSIUM SERPL-SCNC: 4.2 MMOL/L (ref 3.5–5.1)
PROT SERPL-MCNC: 6.6 G/DL (ref 6–8.4)
RBC # BLD AUTO: 2.89 M/UL (ref 4–5.4)
SODIUM SERPL-SCNC: 139 MMOL/L (ref 136–145)
WBC # BLD AUTO: 3.22 K/UL (ref 3.9–12.7)

## 2024-02-28 PROCEDURE — 36415 COLL VENOUS BLD VENIPUNCTURE: CPT | Performed by: NURSE PRACTITIONER

## 2024-02-28 PROCEDURE — 80053 COMPREHEN METABOLIC PANEL: CPT | Performed by: NURSE PRACTITIONER

## 2024-02-28 PROCEDURE — 85025 COMPLETE CBC W/AUTO DIFF WBC: CPT | Performed by: NURSE PRACTITIONER

## 2024-02-28 PROCEDURE — 83735 ASSAY OF MAGNESIUM: CPT | Performed by: NURSE PRACTITIONER

## 2024-02-28 RX ORDER — DIPHENHYDRAMINE HYDROCHLORIDE 50 MG/ML
25 INJECTION INTRAMUSCULAR; INTRAVENOUS ONCE
Status: CANCELLED | OUTPATIENT
Start: 2024-02-28

## 2024-02-28 RX ORDER — HYDROCODONE BITARTRATE AND ACETAMINOPHEN 500; 5 MG/1; MG/1
TABLET ORAL ONCE
Status: CANCELLED | OUTPATIENT
Start: 2024-02-28 | End: 2024-02-28

## 2024-02-28 RX ORDER — ACETAMINOPHEN 325 MG/1
650 TABLET ORAL ONCE
Status: CANCELLED | OUTPATIENT
Start: 2024-02-28

## 2024-02-28 RX ORDER — FUROSEMIDE 10 MG/ML
20 INJECTION INTRAMUSCULAR; INTRAVENOUS ONCE
Status: CANCELLED | OUTPATIENT
Start: 2024-02-28

## 2024-02-28 NOTE — TELEPHONE ENCOUNTER
Patito Gibson, NP-C reviewed patient's labs and per her verbal order, patient to get 1 unit of blood if experiencing SOB with exertion or fatigue. Patient made aware of above, reports she is experiencing increased SOB and fatigue. [facility] made aware to contact patient with appointment. Patient to come today or tomorrow to get type and screen.

## 2024-02-28 NOTE — PROGRESS NOTES
Medical Nutrition Therapy Oncology Progress Note      Patient's PCP:Idalia Greco MD  Referring Provider: No ref. provider found  Subjective:        Patient ID: Alexa Otero is a 84 y.o. female.    Chief Complaint: Unintentional Weight Loss    Past Medical History:   Diagnosis Date    Arthritis     Cardiac arrest 10/2023    Cataract     Colon polyp     Diabetes mellitus type II 2012    Encounter for blood transfusion     Extra-ovarian endometrioid adenocarcinoma 2020    GERD (gastroesophageal reflux disease)     History of chronic cough     clear productive    Hyperlipidemia     Hypertension     Macular degeneration     Ovarian cancer     PONV (postoperative nausea and vomiting)     Squamous cell carcinoma 1980's    precancer of cervix       Past Surgical History:   Procedure Laterality Date    AUGMENTATION OF BREAST      BRONCHOSCOPY N/A 12/12/2023    Procedure: BRONCHOSCOPY;  Surgeon: Neva Christian MD;  Location: [facility];  Service: ENT;  Laterality: N/A;    BRONCHOSCOPY WITH FLUOROSCOPY Right 05/11/2023    Procedure: BRONCHOSCOPY, WITH FLUOROSCOPY;  Surgeon: Neva Christian MD;  Location: [facility];  Service: Pulmonary;  Laterality: Right;    BRONCHOSCOPY WITH FLUOROSCOPY N/A 12/15/2023    Procedure: BRONCHOSCOPY, WITH FLUOROSCOPY;  Surgeon: Neva Christian MD;  Location: [facility];  Service: Pulmonary;  Laterality: N/A;    CATARACT EXTRACTION BILATERAL W/ ANTERIOR VITRECTOMY Bilateral     CHOLECYSTECTOMY      COLONOSCOPY      COLONOSCOPY N/A 04/20/2023    Procedure: COLONOSCOPY;  Surgeon: Sabino Stephenson MD;  Location: [facility];  Service: Endoscopy;  Laterality: N/A;    CORONARY STENT PLACEMENT  10/2023    x 3    ESOPHAGOGASTRODUODENOSCOPY N/A 06/20/2018    Procedure: EGD (ESOPHAGOGASTRODUODENOSCOPY);  Surgeon: Sabino Stephenson MD;  Location: [facility];  Service: Endoscopy;  Laterality: N/A;    ESOPHAGOGASTRODUODENOSCOPY N/A 03/31/2023    Procedure: EGD  (ESOPHAGOGASTRODUODENOSCOPY);  Surgeon: Sabino Stephenson MD;  Location: [facility] ENDO;  Service: Endoscopy;  Laterality: N/A;    ESOPHAGOGASTRODUODENOSCOPY N/A 12/11/2023    Procedure: EGD (ESOPHAGOGASTRODUODENOSCOPY);  Surgeon: Pretty Salgado MD;  Location: [facility] ENDO;  Service: Endoscopy;  Laterality: N/A;    HYSTERECTOMY  1976    partial    INJECTION OF ANESTHETIC AGENT AROUND MEDIAL BRANCH NERVES INNERVATING LUMBAR FACET JOINT Right 05/10/2023    Procedure: Block-nerve-Lateral-branch-lumbar;  Surgeon: Agustin Robles MD;  Location: [facility] OR;  Service: Pain Management;  Laterality: Right;  L5 and s1,s2 LBB    INJECTION OF ANESTHETIC AGENT AROUND MEDIAL BRANCH NERVES INNERVATING LUMBAR FACET JOINT Right 05/30/2023    Procedure: Block-nerve-medial branch-lumbar;  Surgeon: Agustin Robles MD;  Location: [facility];  Service: Pain Management;  Laterality: Right;  L5, s1 ,s2 LBB #2    INJECTION OF ANESTHETIC AGENT AROUND NERVE Right 10/31/2023    Procedure: INTERCOSTAL NERVE BLOCK;  Surgeon: Washington Shankar MD;  Location: [facility];  Service: Cardiothoracic;  Laterality: Right;    INJECTION, SACROILIAC JOINT Right 01/26/2023    Procedure: INJECTION,SACROILIAC JOINT;  Surgeon: Agustin Robles MD;  Location: [facility] OR;  Service: Pain Management;  Laterality: Right;    INSERTION OF TUNNELED CENTRAL VENOUS CATHETER (CVC) WITH SUBCUTANEOUS PORT Right 10/19/2020    Procedure: INSERTION, PORT-A-CATH;  Surgeon: Misti Mcfarland MD;  Location: [facility] OR;  Service: General;  Laterality: Right;    INTRAMEDULLARY RODDING OF TROCHANTER OF FEMUR Right 11/28/2021    Procedure: INSERTION, INTRAMEDULLARY LUC, FEMUR, TROCHANTER/RIGHT TFN DR FAJARDO NOTIFIED REP;  Surgeon: Kp Fajardo MD;  Location: [facility] OR;  Service: Orthopedics;  Laterality: Right;  SKIP    ROBOT-ASSISTED LAPAROSCOPIC LYMPHADENECTOMY USING DA NELLA XI N/A 09/21/2020    Procedure: XI ROBOTIC LYMPHADENECTOMY-pelvic and kell-aortic;  Surgeon: Altagracia Ray MD;  Location:  [facility] OR;  Service: OB/GYN;  Laterality: N/A;    ROBOT-ASSISTED LAPAROSCOPIC OMENTECTOMY USING DA NELLA XI N/A 2020    Procedure: XI ROBOTIC OMENTECTOMY;  Surgeon: Altagracia Ray MD;  Location: [facility] OR;  Service: OB/GYN;  Laterality: N/A;    ROBOT-ASSISTED LAPAROSCOPIC SALPINGO-OOPHORECTOMY USING DA NELLA XI Bilateral 2020    Procedure: XI ROBOTIC SALPINGO-OOPHORECTOMY;  Surgeon: Altagracia Ray MD;  Location: [facility] OR;  Service: OB/GYN;  Laterality: Bilateral;    THORACOSCOPIC BIOPSY OF PLEURA Right 10/31/2023    Procedure: VATS, WITH PLEURA BIOPSY;  Surgeon: Washington Shankar MD;  Location: [facility] OR;  Service: Cardiothoracic;  Laterality: Right;  FROZEN SECTION   **KRISTEN-ASSISDT**    THORACOTOMY Right 10/31/2023    Procedure: THORACOTOMY;  Surgeon: Washington Shankar MD;  Location: [facility] OR;  Service: Cardiothoracic;  Laterality: Right;    UPPER GASTROINTESTINAL ENDOSCOPY         Social History     Socioeconomic History    Marital status:    Tobacco Use    Smoking status: Former     Current packs/day: 0.00     Average packs/day: 1 pack/day for 20.0 years (20.0 ttl pk-yrs)     Types: Cigarettes     Start date:      Quit date:      Years since quittin.1    Smokeless tobacco: Never    Tobacco comments:     quit 40 yrs ago   Substance and Sexual Activity    Alcohol use: Yes     Alcohol/week: 1.0 standard drink of alcohol     Types: 1 Glasses of wine per week     Comment: occasional    Drug use: No    Sexual activity: Not Currently     Partners: Male     Social Determinants of Health     Financial Resource Strain: Low Risk  (2023)    Overall Financial Resource Strain (CARDIA)     Difficulty of Paying Living Expenses: Not very hard   Food Insecurity: Food Insecurity Present (2023)    Hunger Vital Sign     Worried About Running Out of Food in the Last Year: Sometimes true     Ran Out of Food in the Last Year: Never true   Transportation Needs: No Transportation Needs  (11/27/2023)    PRAPARE - Transportation     Lack of Transportation (Medical): No     Lack of Transportation (Non-Medical): No   Physical Activity: Unknown (11/27/2023)    Exercise Vital Sign     Days of Exercise per Week: 0 days   Recent Concern: Physical Activity - Inactive (11/27/2023)    Exercise Vital Sign     Days of Exercise per Week: 0 days     Minutes of Exercise per Session: 0 min   Stress: Stress Concern Present (11/27/2023)    [employer] - Occupational Stress Questionnaire     Feeling of Stress : To some extent   Social Connections: Unknown (11/27/2023)    Social Connection and Isolation Panel [NHANES]     Frequency of Communication with Friends and Family: More than three times a week     Frequency of Social Gatherings with Friends and Family: Once a week     Active Member of Clubs or Organizations: No     Attends Club or Organization Meetings: Never     Marital Status:    Housing Stability: Low Risk  (11/27/2023)    Housing Stability Vital Sign     Unable to Pay for Housing in the Last Year: No     Number of Places Lived in the Last Year: 1     Unstable Housing in the Last Year: No       Family History   Problem Relation Age of Onset    Cancer Mother 57        lung    Cancer Father 56        oral    Skin cancer Sister     Skin cancer Brother     Melanoma Brother     Skin cancer Brother     Breast cancer Maternal Grandmother     Breast cancer Paternal Grandmother     Colon polyps Daughter     Psoriasis Neg Hx     Lupus Neg Hx     Eczema Neg Hx     Colon cancer Neg Hx     Crohn's disease Neg Hx     Esophageal cancer Neg Hx     Stomach cancer Neg Hx     Ulcerative colitis Neg Hx        Review of patient's allergies indicates:  No Known Allergies    Current Outpatient Medications:     albuterol (VENTOLIN HFA) 90 mcg/actuation inhaler, Inhale 2 puffs into the lungs every 4 (four) hours as needed for Wheezing or Shortness of Breath. Rescue, Disp: 6.7 g, Rfl: 1     amiodarone (PACERONE) 200 MG Tab, Take 1 tablet (200 mg total) by mouth 2 (two) times daily., Disp: 60 tablet, Rfl: 11    aspirin (ECOTRIN) 81 MG EC tablet, Take 81 mg by mouth once daily., Disp: , Rfl:     benazepriL (LOTENSIN) 40 MG tablet, 1/2 po qd, Disp: 45 tablet, Rfl: 3    clopidogreL (PLAVIX) 75 mg tablet, Take 1 tablet (75 mg total) by mouth once daily., Disp: , Rfl:     folic acid (FOLVITE) 1 MG tablet, Take 1 tablet (1 mg total) by mouth once daily., Disp: 100 tablet, Rfl: 3    gabapentin (NEURONTIN) 100 MG capsule, Take 1 capsule (100 mg total) by mouth 2 (two) times daily., Disp: 180 capsule, Rfl: 3    HYDROcodone-acetaminophen (NORCO) 5-325 mg per tablet, Take 1 tablet by mouth every 6 (six) hours as needed for Pain., Disp: , Rfl:     LORazepam (ATIVAN) 1 MG tablet, Take 1 tablet (1 mg total) by mouth every 6 (six) hours as needed for Anxiety (insomnia)., Disp: 30 tablet, Rfl: 2    magnesium oxide (MAG-OX) 400 mg (241.3 mg magnesium) tablet, Take 1 tablet (400 mg total) by mouth once daily. Patient requested refill, Disp: 90 tablet, Rfl: 3    melatonin 5 mg Chew, Take 1 tablet by mouth nightly as needed (insomnia)., Disp: , Rfl:     metoprolol succinate (TOPROL-XL) 25 MG 24 hr tablet, Take 25 mg by mouth once daily., Disp: , Rfl:     omeprazole (PRILOSEC) 40 MG capsule, Take 1 capsule (40 mg total) by mouth once daily., Disp: 90 capsule, Rfl: 3    ondansetron (ZOFRAN) 8 MG tablet, Take 1 tablet (8 mg total) by mouth every 8 (eight) hours as needed., Disp: 30 tablet, Rfl: 5    potassium chloride (K-TAB) 20 mEq, Take 1 tablet by mouth once daily., Disp: , Rfl:     promethazine (PHENERGAN) 25 MG tablet, Take 1 tablet (25 mg total) by mouth every 4 (four) hours as needed for Nausea., Disp: 30 tablet, Rfl: 5    VIT A/VIT C/VIT E/ZINC/COPPER (ICAPS AREDS ORAL), Take 1 capsule by mouth 2 (two) times a day. , Disp: , Rfl:     All medications and past history have been reviewed.    OP NSCLC PEMETREXED +  "CARBOPLATIN (AUC) Q3W      Treatment Goal:   Control      Status:   Active      Start Date:   1/25/2024      End Date:   3/28/2024 (Planned)      Provider:   Virgil Cassidy MD      Chemotherapy:   CARBOplatin (PARAPLATIN) 230 mg in sodium chloride 0.9% 308 mL chemo infusion, 230 mg (75 % of original dose 305 mg), Intravenous, Clinic/HOD 1 time, 2 of 4 cycles    Dose modification:   (original dose 305 mg, Cycle 1), 228.75 mg (75 % of original dose 305 mg, Cycle 1, Reason: Dose Not Tolerated),   (original dose 305 mg, Cycle 2, Reason: MD Discretion)    Administration: 230 mg (1/25/2024), 300 mg (2/15/2024)        PEMEtrexed disodium (ALIMTA) 575 mg in sodium chloride 0.9% SolP 100 mL chemo infusion, 375 mg/m2 = 575 mg (75 % of original dose 500 mg/m2), Intravenous, Clinic/HOD 1 time, 2 of 4 cycles    Dose modification: 375 mg/m2 (75 % of original dose 500 mg/m2, Cycle 1, Reason: Dose Not Tolerated)    Administration: 575 mg (1/25/2024), 575 mg (2/15/2024)      Objective:      Wt Readings from Last 3 Encounters:   02/28/24 51.7 kg (114 lb)   02/15/24 51.1 kg (112 lb 10.5 oz)   01/29/24 53.8 kg (118 lb 8 oz)     Last Labs:  Last Labs:  Glucose   Date Value Ref Range Status   02/28/2024 130 (H) 70 - 110 mg/dL Final   02/23/2024 113 (H) 70 - 110 mg/dL Final     BUN   Date Value Ref Range Status   02/28/2024 25 (H) 8 - 23 mg/dL Final   02/23/2024 31 (H) 8 - 23 mg/dL Final     Creatinine   Date Value Ref Range Status   02/28/2024 1.1 0.5 - 1.4 mg/dL Final   02/23/2024 1.1 0.5 - 1.4 mg/dL Final     Sodium   Date Value Ref Range Status   02/28/2024 139 136 - 145 mmol/L Final   02/23/2024 138 136 - 145 mmol/L Final     Potassium   Date Value Ref Range Status   02/28/2024 4.2 3.5 - 5.1 mmol/L Final   02/23/2024 4.4 3.5 - 5.1 mmol/L Final     No results found for: "PHOS"  Calcium   Date Value Ref Range Status   02/28/2024 8.9 8.7 - 10.5 mg/dL Final   02/23/2024 8.9 8.7 - 10.5 mg/dL Final     No results found for: " ""PREALBUMIN"  Total Protein   Date Value Ref Range Status   02/28/2024 6.6 6.0 - 8.4 g/dL Final   02/23/2024 6.7 6.0 - 8.4 g/dL Final     Cholesterol   Date Value Ref Range Status   11/28/2023 200 (H) 120 - 199 mg/dL Final     Comment:     The National Cholesterol Education Program (NCEP) has set the  following guidelines (reference ranges) for Cholesterol:  Optimal.....................<200 mg/dL  Borderline High.............200-239 mg/dL  High........................> or = 240 mg/dL     05/08/2023 169 120 - 199 mg/dL Final     Comment:     The National Cholesterol Education Program (NCEP) has set the  following guidelines (reference ranges) for Cholesterol:  Optimal.....................<200 mg/dL  Borderline High.............200-239 mg/dL  High........................> or = 240 mg/dL       Hemoglobin A1C   Date Value Ref Range Status   11/28/2023 5.3 4.0 - 5.6 % Final     Comment:     ADA Screening Guidelines:  5.7-6.4%  Consistent with prediabetes  >or=6.5%  Consistent with diabetes    High levels of fetal hemoglobin interfere with the HbA1C  assay. Heterozygous hemoglobin variants (HbS, HgC, etc)do  not significantly interfere with this assay.   However, presence of multiple variants may affect accuracy.     10/13/2023 5.8 (H) 0.0 - 5.6 % Final     Comment:     Reference Interval:  5.0 - 5.6 Normal   5.7 - 6.4 High Risk   > 6.5 Diabetic      Hgb A1c results are standardized based on the (NGSP) National   Glycohemoglobin Standardization Program.      Hemoglobin A1C levels are related to mean serum/plasma glucose   during the preceding 2-3 months.          Hemoglobin   Date Value Ref Range Status   02/23/2024 8.5 (L) 12.0 - 16.0 g/dL Final   02/21/2024 8.6 (L) 12.0 - 16.0 g/dL Final     POC Hematocrit   Date Value Ref Range Status   10/31/2023 26 (L) 36 - 54 %PCV Final   10/31/2023 26 (L) 36 - 54 %PCV Final     Hematocrit   Date Value Ref Range Status   02/23/2024 28.6 (L) 37.0 - 48.5 % Final   02/21/2024 28.2 (L) " "37.0 - 48.5 % Final     Iron   Date Value Ref Range Status   12/11/2023 20 (L) 30 - 160 ug/dL Final     No components found for: "FROLATE"  Vit D, 25-Hydroxy   Date Value Ref Range Status   05/08/2023 38 30 - 96 ng/mL Final     Comment:     Vitamin D deficiency.........<10 ng/mL                              Vitamin D insufficiency......10-29 ng/mL       Vitamin D sufficiency........> or equal to 30 ng/mL  Vitamin D toxicity............>100 ng/mL     11/04/2022 46 30 - 96 ng/mL Final     Comment:     Vitamin D deficiency.........<10 ng/mL                              Vitamin D insufficiency......10-29 ng/mL       Vitamin D sufficiency........> or equal to 30 ng/mL  Vitamin D toxicity............>100 ng/mL       WBC   Date Value Ref Range Status   02/23/2024 2.10 (L) 3.90 - 12.70 K/uL Final   02/21/2024 1.93 (LL) 3.90 - 12.70 K/uL Final     Comment:     WBC critical result(s) called and verbal readback obtained from   Ghada Gaona RN  by [place] 02/21/2024 14:23         Assessment:     Nutrition/Diet History     Patient Reported Diet/Restrictions/Preferences: regular diet  Food Allergies: NKFA  Factors Affecting Nutritional Intake: Decreased appetite, N/V    Estimated/Assessed Needs     Weight Used For Calorie Calculations: 52 kg (114 lb)  Energy Calorie Requirements (kcal): 4912-1791 kcal/day   Energy Need Method: 30-35 Kcal/kg  Protein Requirements: 62-78 g/day   Protein Need Method: 1.2-1.5 g/kg  Fluid Requirements: 1500 ml/day  Estimated Fluid Requirement Method: 1ml/kcal      Nutrition Support  N/A    Evaluation of Received Nutrient/Fluid Intake     Calorie Intake: not meeting needs  Protein Intake: not meeting needs  Fluid Intake: meeting needs  Tolerance: tolerating  % Intake of Estimated Energy Needs: 50-75 %      Nutrition Diagnosis Related to (Etiology) As Evidenced By (Signs/Symptoms)   Inadequate energy intake Decreased ability to consume sufficient energy Decreased appetite, N/V, unintentional weight " loss     RD Notes  Mrs. Alexa Otero is an 83y/o female with lung cancer currently receiving carboplatin and alimta. Pt reports decreased appetite due to N/V after her chemo which has led to some unintentional weight loss. She eats 2-3 small meals per day and does drink Premier Protein shakes 1-2x/day. She does have her decreased appetite packet at home but has not read it. She also did not take her antiemetics as prescribed after her last chemo. CW: 114#    Nutrition Intervention:      Nutrition Intervention Energy-modified diet, Protein-modified diet, and Schedule of food/fluids   Goals/Expected Outcomes Initiate small, frequent meals and snacks with high calorie/protein food choices to meet estimated nutritional needs   Progress Initial     Nutrition Intervention Medical food supplement: Commercial beverage   Goals/Expected Outcomes Initiate high calorie ONS BID to assist in meeting estimated nutritional needs and prevent unintentional weight loss   Progress Initial     Plan  Discussed examples of high calorie/protein foods and strategies to increase nutrient density of current intake  Recommended high calorie ONS BID  Recommended taking antiemetics as prescribed to prevent nausea  Recommended small, frequent meals and snacks  Provided RD contact info. Encouraged pt to call with questions or concerns    Monitoring/Evaluation:     Monitor: po intake, weight, BMs, N/V    Next Visit: f/u as needed      I have explained and the patient understands all of  the current recommendation(s). I have answered all of their questions to the best of my ability and to their complete satisfaction.   The patient is to continue with the current management plan.    Electronically signed by: Dixie Melgar MBA, RDN, LDN

## 2024-02-29 ENCOUNTER — LAB VISIT (OUTPATIENT)
Dept: LAB | Facility: HOSPITAL | Age: 84
End: 2024-02-29
Attending: NURSE PRACTITIONER
Payer: MEDICARE

## 2024-02-29 DIAGNOSIS — D64.9 SYMPTOMATIC ANEMIA: ICD-10-CM

## 2024-02-29 LAB
ABO + RH BLD: NORMAL
BLD GP AB SCN CELLS X3 SERPL QL: NORMAL
SPECIMEN OUTDATE: NORMAL

## 2024-02-29 PROCEDURE — 86850 RBC ANTIBODY SCREEN: CPT | Performed by: NURSE PRACTITIONER

## 2024-02-29 PROCEDURE — 36415 COLL VENOUS BLD VENIPUNCTURE: CPT | Performed by: NURSE PRACTITIONER

## 2024-03-01 ENCOUNTER — INFUSION (OUTPATIENT)
Dept: INFUSION THERAPY | Facility: HOSPITAL | Age: 84
End: 2024-03-01
Attending: NURSE PRACTITIONER
Payer: MEDICARE

## 2024-03-01 VITALS
WEIGHT: 114.31 LBS | SYSTOLIC BLOOD PRESSURE: 118 MMHG | BODY MASS INDEX: 20.25 KG/M2 | HEART RATE: 79 BPM | DIASTOLIC BLOOD PRESSURE: 60 MMHG | TEMPERATURE: 97 F | OXYGEN SATURATION: 97 % | RESPIRATION RATE: 16 BRPM

## 2024-03-01 DIAGNOSIS — D64.9 SYMPTOMATIC ANEMIA: ICD-10-CM

## 2024-03-01 LAB
BLD PROD TYP BPU: NORMAL
BLOOD UNIT EXPIRATION DATE: NORMAL
BLOOD UNIT TYPE CODE: 600
BLOOD UNIT TYPE: NORMAL
CODING SYSTEM: NORMAL
CROSSMATCH INTERPRETATION: NORMAL
DISPENSE STATUS: NORMAL
NUM UNITS TRANS PACKED RBC: NORMAL

## 2024-03-01 PROCEDURE — 36430 TRANSFUSION BLD/BLD COMPNT: CPT

## 2024-03-01 PROCEDURE — 86920 COMPATIBILITY TEST SPIN: CPT | Performed by: NURSE PRACTITIONER

## 2024-03-01 PROCEDURE — 96374 THER/PROPH/DIAG INJ IV PUSH: CPT

## 2024-03-01 PROCEDURE — 25000003 PHARM REV CODE 250: Performed by: NURSE PRACTITIONER

## 2024-03-01 PROCEDURE — 63600175 PHARM REV CODE 636 W HCPCS: Performed by: NURSE PRACTITIONER

## 2024-03-01 PROCEDURE — P9040 RBC LEUKOREDUCED IRRADIATED: HCPCS | Performed by: NURSE PRACTITIONER

## 2024-03-01 RX ORDER — DIPHENHYDRAMINE HYDROCHLORIDE 50 MG/ML
25 INJECTION INTRAMUSCULAR; INTRAVENOUS ONCE
Status: COMPLETED | OUTPATIENT
Start: 2024-03-01 | End: 2024-03-01

## 2024-03-01 RX ORDER — HYDROCODONE BITARTRATE AND ACETAMINOPHEN 500; 5 MG/1; MG/1
TABLET ORAL ONCE
Status: COMPLETED | OUTPATIENT
Start: 2024-03-01 | End: 2024-03-01

## 2024-03-01 RX ORDER — ACETAMINOPHEN 325 MG/1
650 TABLET ORAL ONCE
Status: COMPLETED | OUTPATIENT
Start: 2024-03-01 | End: 2024-03-01

## 2024-03-01 RX ORDER — FUROSEMIDE 10 MG/ML
20 INJECTION INTRAMUSCULAR; INTRAVENOUS ONCE
Status: DISCONTINUED | OUTPATIENT
Start: 2024-03-01 | End: 2024-03-21 | Stop reason: SDUPTHER

## 2024-03-01 RX ADMIN — SODIUM CHLORIDE: 9 INJECTION, SOLUTION INTRAVENOUS at 09:03

## 2024-03-01 RX ADMIN — DIPHENHYDRAMINE HYDROCHLORIDE 25 MG: 50 INJECTION INTRAMUSCULAR; INTRAVENOUS at 07:03

## 2024-03-01 RX ADMIN — ACETAMINOPHEN 650 MG: 325 TABLET ORAL at 07:03

## 2024-03-01 NOTE — PLAN OF CARE
Problem: Adult Inpatient Plan of Care  Goal: Optimal Comfort and Wellbeing  Outcome: Ongoing, Progressing  Intervention: Provide Person-Centered Care  Flowsheets (Taken 3/1/2024 8704)  Trust Relationship/Rapport:   care explained   choices provided   emotional support provided   empathic listening provided   questions answered   questions encouraged   reassurance provided   thoughts/feelings acknowledged

## 2024-03-04 ENCOUNTER — OUTPATIENT CASE MANAGEMENT (OUTPATIENT)
Dept: ADMINISTRATIVE | Facility: OTHER | Age: 84
End: 2024-03-04
Payer: MEDICARE

## 2024-03-04 NOTE — PROGRESS NOTES
Outpatient Care Management  Plan of Care Follow Up Visit    Patient: Alexa Otero  MRN: [id number]  Date of Service: 03/04/2024  Completed by: Hui Hayes RN  Referral Date: 12/12/2023    Reason for Visit   Patient presents with    OPCM RN Follow Up Call       Brief Summary: OPCM RN followed up with Mrs. Liu today for care plan review.    Next Steps:   Mrs. Liu agreed to OPCM RN follow up on or around 03/25/24.  Follow up post hemoc visit and review plan of care.  PATIENT SELF MANAGEMENT PLAN:  Mrs. Liu agreed to continue to drink Ensure for increased protein and nutrition daily.

## 2024-03-05 ENCOUNTER — TELEPHONE (OUTPATIENT)
Dept: CARDIOLOGY | Facility: CLINIC | Age: 84
End: 2024-03-05
Payer: MEDICARE

## 2024-03-05 ENCOUNTER — OFFICE VISIT (OUTPATIENT)
Dept: HEMATOLOGY/ONCOLOGY | Facility: CLINIC | Age: 84
End: 2024-03-05
Payer: MEDICARE

## 2024-03-05 VITALS
HEART RATE: 94 BPM | TEMPERATURE: 98 F | SYSTOLIC BLOOD PRESSURE: 165 MMHG | DIASTOLIC BLOOD PRESSURE: 72 MMHG | RESPIRATION RATE: 16 BRPM | WEIGHT: 116.88 LBS | BODY MASS INDEX: 20.71 KG/M2

## 2024-03-05 DIAGNOSIS — D50.0 IRON DEFICIENCY ANEMIA DUE TO CHRONIC BLOOD LOSS: ICD-10-CM

## 2024-03-05 DIAGNOSIS — D69.6 THROMBOCYTOPENIA: ICD-10-CM

## 2024-03-05 DIAGNOSIS — C34.31 MALIGNANT NEOPLASM OF LOWER LOBE OF RIGHT LUNG: Primary | ICD-10-CM

## 2024-03-05 DIAGNOSIS — G47.00 INSOMNIA, UNSPECIFIED TYPE: ICD-10-CM

## 2024-03-05 PROBLEM — C34.90 BRONCHOGENIC CARCINOMA: Status: RESOLVED | Noted: 2023-12-04 | Resolved: 2024-03-05

## 2024-03-05 PROCEDURE — 1126F AMNT PAIN NOTED NONE PRSNT: CPT | Mod: CPTII,S$GLB,, | Performed by: INTERNAL MEDICINE

## 2024-03-05 PROCEDURE — 3078F DIAST BP <80 MM HG: CPT | Mod: CPTII,S$GLB,, | Performed by: INTERNAL MEDICINE

## 2024-03-05 PROCEDURE — 1157F ADVNC CARE PLAN IN RCRD: CPT | Mod: CPTII,S$GLB,, | Performed by: INTERNAL MEDICINE

## 2024-03-05 PROCEDURE — 3077F SYST BP >= 140 MM HG: CPT | Mod: CPTII,S$GLB,, | Performed by: INTERNAL MEDICINE

## 2024-03-05 PROCEDURE — 1101F PT FALLS ASSESS-DOCD LE1/YR: CPT | Mod: CPTII,S$GLB,, | Performed by: INTERNAL MEDICINE

## 2024-03-05 PROCEDURE — 3288F FALL RISK ASSESSMENT DOCD: CPT | Mod: CPTII,S$GLB,, | Performed by: INTERNAL MEDICINE

## 2024-03-05 PROCEDURE — 99214 OFFICE O/P EST MOD 30 MIN: CPT | Mod: S$GLB,,, | Performed by: INTERNAL MEDICINE

## 2024-03-05 PROCEDURE — 1159F MED LIST DOCD IN RCRD: CPT | Mod: CPTII,S$GLB,, | Performed by: INTERNAL MEDICINE

## 2024-03-05 RX ORDER — LORAZEPAM 1 MG/1
1 TABLET ORAL EVERY 6 HOURS PRN
Qty: 30 TABLET | Refills: 2 | Status: SHIPPED | OUTPATIENT
Start: 2024-03-05 | End: 2024-04-24

## 2024-03-05 NOTE — ASSESSMENT & PLAN NOTE
This is certainly chemotherapy related but need to monitor given she is on Plavix therapy.  Will check again this week.  Would hold if less than 50k.

## 2024-03-05 NOTE — ASSESSMENT & PLAN NOTE
Patient is now s/p 2 cycles of chemotherapy and seems to be doing ok with this.  Does have cytopenias which are to be expected but doing ok with this.  Will have labs this week and I feel she can continue with therapy if they are appropriate to do so.  Discussed this today.

## 2024-03-05 NOTE — PROGRESS NOTES
PROGRESS NOTE    Subjective:       Patient ID: Alexa Otero is a 84 y.o. female.    8/15/2023 PET:  IMPRESSION:  1. Multifocal right lower lobe and right pleural FDG hypermetabolism as described, with associated right lower lobe consolidation. These are nonspecific and could be of infectious or inflammatory etiology in the clinical setting of chronic pneumonia, and or metastatic disease. Correlation with previous bronchoscopy results is needed.  2. Multiple new non hypermetabolic pulmonary nodules in both lungs, which are generally below size resolution for PET, but suspicious for pulmonary metastases.  3. No additional findings of FDG avid metastatic disease.    10/31/2023 RLL Lobectomy:  LUNG SYNOPTIC REPORT   PROCEDURE:     Lobectomy.   SPECIMEN LATERALITY:    Right   TUMOR SITE:     Lower lobe.   TUMOR SIZE:     Cannot be determined, 4.5 cm area of cavitation but    tumor appears to involve most if not all of the resected lobe.   TUMOR FOCALITY:    Single tumor.   HISTOLOGIC TYPE:    Invasive mucinous adenocarcinoma with pneumonic    pattern.   VISCEROPLEURAL INVASION:   Present   LYMPHOVASCULAR INVASION:   Not identified.   SPREAD OF TUMOR THROUGH AIR SPACES:  Present   DIRECT INVASION OF ADJACENT STRUCTURES: No adjacent structures present.   MARGINS:     All margins are uninvolved by tumor, margins examined:         Bronchial, distance of invasion of tumor from closest         margin: cannot be determined.   TREATMENT EFFECT:    No known presurgical therapy.   ADDITIONAL PATHOLOGIC FINDINGS:  Organized pulmonary emboli, pulmonary    infarct.   REGIONAL LYMPH NODES:    NUMBER OF LYMPH NODES INVOLVED:  Zero, number of lymph nodes examined:     1, yue         structures examined: 10 (hilar)   PATHOLOGIC STAGE CLASSIFICATION    OF PRIMARY TUMOR:    pT3    REGIONAL LYMPH NODES:   pN0     Comment: The sections show invasive mucinous adenocarcinoma with     findings that suggest so-called pneumonic pattern. there is diffuse    involvement with tumor present in all the histologic sections, often    with a lepidic pattern suggesting spread through the air spaces. The    reported CT findings of a consolidated appearance correlates with the    histologic findings and the reported clinical presentation of a    year-long cough productive of purulent mucus. Mucus is also a common    presentation of this somewhat uncommon pulmonary malignancy. These    tumors are two complete stages pT3 when there appears to be involvement    of the entire lobe; this appears to be appropriate in this case. There    are also organizing/organized pulmonary emboli within the vasculature,    possibly indicating hypercoagulability of malignancy as can be seen    especially with mucinous tumors. The tissue sampled in block 2F is    likely an infarct related to this. The history of endometrioid    adenocarcinoma is noted, but the current tumor represents a primary    lung malignancy and is unrelated to the prior cancer.     10/13/2023-Echo:  Normal systolic function with EF 55-60%  Grade I diastolic dysfunction    12/10/2023-CT CAP:  Progression of the alveolar consolidation in the right mid and lower lung with moderate size right pleural effusion. There is progression of the airspace disease within the right upper lobe with patchy groundglass opacity left upper lobe and left lung base. Findings are compatible with multifocal.     12/15/2023-Bronchoscopy:  LUNG, RIGHT MIDDLE LOBE, BIOPSY:   - ATYPICAL ADENOMATOUS HYPERPLASIA.   - IN A BACKGROUND OF REACTIVE FIBROSIS AND FIBRIN.     Comment:   Given the patient's history, this lesion is concerning for well    differentiated lepidic type adenocarcinoma but is quantitatively    insufficient (approximately 1 mm) for a definite diagnosis.     Multiple additional leveled sections were examined.     Carbo/Pemetrexed:  Cycle 1: 1/25/2024  Cycle  2: 2/15/2024  Cycle 3: 3/7/2024-due    Chief Complaint:  No chief complaint on file.  Stage IV Lung cancer follow up    History of Present Illness:   Alexa Otero is a 84 y.o. female who presents for follow up of lung cancer.    Ms. Otero has now had 2 cycles of chemotherapy    She needed blood last week and developed a trembling episode the evening after which resolved by morning .       Family and Social history reviewed and is unchanged from 12/1/2023             Current Outpatient Medications:     albuterol (VENTOLIN HFA) 90 mcg/actuation inhaler, Inhale 2 puffs into the lungs every 4 (four) hours as needed for Wheezing or Shortness of Breath. Rescue, Disp: 6.7 g, Rfl: 1    amiodarone (PACERONE) 200 MG Tab, Take 1 tablet (200 mg total) by mouth 2 (two) times daily., Disp: 60 tablet, Rfl: 11    aspirin (ECOTRIN) 81 MG EC tablet, Take 81 mg by mouth once daily., Disp: , Rfl:     benazepriL (LOTENSIN) 40 MG tablet, 1/2 po qd, Disp: 45 tablet, Rfl: 3    clopidogreL (PLAVIX) 75 mg tablet, Take 1 tablet (75 mg total) by mouth once daily., Disp: , Rfl:     folic acid (FOLVITE) 1 MG tablet, Take 1 tablet (1 mg total) by mouth once daily., Disp: 100 tablet, Rfl: 3    gabapentin (NEURONTIN) 100 MG capsule, Take 1 capsule (100 mg total) by mouth 2 (two) times daily., Disp: 180 capsule, Rfl: 3    HYDROcodone-acetaminophen (NORCO) 5-325 mg per tablet, Take 1 tablet by mouth every 6 (six) hours as needed for Pain., Disp: , Rfl:     LORazepam (ATIVAN) 1 MG tablet, Take 1 tablet (1 mg total) by mouth every 6 (six) hours as needed for Anxiety (insomnia)., Disp: 30 tablet, Rfl: 2    magnesium oxide (MAG-OX) 400 mg (241.3 mg magnesium) tablet, Take 1 tablet (400 mg total) by mouth once daily. Patient requested refill, Disp: 90 tablet, Rfl: 3    melatonin 5 mg Chew, Take 1 tablet by mouth nightly as needed (insomnia)., Disp: , Rfl:     metoprolol succinate (TOPROL-XL) 25 MG 24 hr tablet, Take 25 mg by mouth once daily., Disp: ,  Rfl:     omeprazole (PRILOSEC) 40 MG capsule, Take 1 capsule (40 mg total) by mouth once daily., Disp: 90 capsule, Rfl: 3    ondansetron (ZOFRAN) 8 MG tablet, Take 1 tablet (8 mg total) by mouth every 8 (eight) hours as needed., Disp: 30 tablet, Rfl: 5    potassium chloride (K-TAB) 20 mEq, Take 1 tablet by mouth once daily., Disp: , Rfl:     promethazine (PHENERGAN) 25 MG tablet, Take 1 tablet (25 mg total) by mouth every 4 (four) hours as needed for Nausea., Disp: 30 tablet, Rfl: 5    VIT A/VIT C/VIT E/ZINC/COPPER (ICAPS AREDS ORAL), Take 1 capsule by mouth 2 (two) times a day. , Disp: , Rfl:     Current Facility-Administered Medications:     furosemide injection 20 mg, 20 mg, Intravenous, Once, Patito Gibson, NP-C        Objective:       Physical Examination:     BP (!) 165/72   Pulse 94   Temp 98 °F (36.7 °C)   Resp 16   Wt 53 kg (116 lb 14.4 oz)   BMI 20.71 kg/m²     Physical Exam  Constitutional:       Appearance: Normal appearance.   HENT:      Head: Normocephalic and atraumatic.   Eyes:      General: No scleral icterus.     Conjunctiva/sclera: Conjunctivae normal.   Cardiovascular:      Rate and Rhythm: Normal rate.   Pulmonary:      Effort: Pulmonary effort is normal.   Abdominal:      General: Abdomen is flat.   Neurological:      General: No focal deficit present.      Mental Status: She is alert and oriented to person, place, and time.   Psychiatric:         Mood and Affect: Mood normal.         Behavior: Behavior normal.         Thought Content: Thought content normal.         Judgment: Judgment normal.         Labs:   Recent Results (from the past 336 hour(s))   CBC Auto Differential    Collection Time: 02/28/24 11:02 AM   Result Value Ref Range    WBC 3.22 (L) 3.90 - 12.70 K/uL    Hemoglobin 7.6 (L) 12.0 - 16.0 g/dL    Hematocrit 26.0 (L) 37.0 - 48.5 %    Platelets 58 (L) 150 - 450 K/uL   CBC Auto Differential    Collection Time: 02/23/24 12:33 PM   Result Value Ref Range    WBC 2.10 (L) 3.90 -  12.70 K/uL    Hemoglobin 8.5 (L) 12.0 - 16.0 g/dL    Hematocrit 28.6 (L) 37.0 - 48.5 %    Platelets 193 150 - 450 K/uL   CBC Auto Differential    Collection Time: 02/21/24 11:13 AM   Result Value Ref Range    WBC 1.93 (LL) 3.90 - 12.70 K/uL    Hemoglobin 8.6 (L) 12.0 - 16.0 g/dL    Hematocrit 28.2 (L) 37.0 - 48.5 %    Platelets 277 150 - 450 K/uL     CMP  Sodium   Date Value Ref Range Status   02/28/2024 139 136 - 145 mmol/L Final     Potassium   Date Value Ref Range Status   02/28/2024 4.2 3.5 - 5.1 mmol/L Final     Chloride   Date Value Ref Range Status   02/28/2024 102 95 - 110 mmol/L Final     CO2   Date Value Ref Range Status   02/28/2024 28 23 - 29 mmol/L Final     Glucose   Date Value Ref Range Status   02/28/2024 130 (H) 70 - 110 mg/dL Final     BUN   Date Value Ref Range Status   02/28/2024 25 (H) 8 - 23 mg/dL Final     Creatinine   Date Value Ref Range Status   02/28/2024 1.1 0.5 - 1.4 mg/dL Final     Calcium   Date Value Ref Range Status   02/28/2024 8.9 8.7 - 10.5 mg/dL Final     Total Protein   Date Value Ref Range Status   02/28/2024 6.6 6.0 - 8.4 g/dL Final     Albumin   Date Value Ref Range Status   02/28/2024 3.2 (L) 3.5 - 5.2 g/dL Final     Total Bilirubin   Date Value Ref Range Status   02/28/2024 0.2 0.1 - 1.0 mg/dL Final     Comment:     For infants and newborns, interpretation of results should be based  on gestational age, weight and in agreement with clinical  observations.    Premature Infant recommended reference ranges:  Up to 24 hours.............<8.0 mg/dL  Up to 48 hours............<12.0 mg/dL  3-5 days..................<15.0 mg/dL  6-29 days.................<15.0 mg/dL       Alkaline Phosphatase   Date Value Ref Range Status   02/28/2024 36 (L) 55 - 135 U/L Final     AST   Date Value Ref Range Status   02/28/2024 9 (L) 10 - 40 U/L Final     ALT   Date Value Ref Range Status   02/28/2024 12 10 - 44 U/L Final     Anion Gap   Date Value Ref Range Status   02/28/2024 9 8 - 16 mmol/L Final  "    eGFR if    Date Value Ref Range Status   04/28/2022 40.6 (A) >60 mL/min/1.73 m^2 Final     eGFR if non    Date Value Ref Range Status   04/28/2022 35.3 (A) >60 mL/min/1.73 m^2 Final     Comment:     Calculation used to obtain the estimated glomerular filtration  rate (eGFR) is the CKD-EPI equation.        Lab Results   Component Value Date    CEA 1.3 07/23/2020     No results found for: "PSA"        Assessment/Plan:     Problem List Items Addressed This Visit       Thrombocytopenia     This is certainly chemotherapy related but need to monitor given she is on Plavix therapy.  Will check again this week.  Would hold if less than 50k.           Malignant neoplasm of lower lobe of right lung - Primary     Patient is now s/p 2 cycles of chemotherapy and seems to be doing ok with this.  Does have cytopenias which are to be expected but doing ok with this.  Will have labs this week and I feel she can continue with therapy if they are appropriate to do so.  Discussed this today.          Iron deficiency anemia due to chronic blood loss     She did have blood transfusion.  May have had a brief febrile reactions.  Doing well now.           Other Visit Diagnoses       Insomnia, unspecified type        Relevant Medications    LORazepam (ATIVAN) 1 MG tablet          Discussion:     Follow up in about 3 weeks (around 3/26/2024) for for NP visit and 6 with me. .      Electronically signed by Virgil Lloyd      "

## 2024-03-05 NOTE — TELEPHONE ENCOUNTER
----- Message from Erin Medeiros sent at 3/5/2024 10:42 AM CST -----  Regarding: Pt called to speak to the nurse regarding her 3/20/24 appt and to also discuss a medical release so that the doctor can her her medical records sent from her previous cardiology provider  Patient Advice:    Pt called to speak to the nurse regarding her 3/20/24 appt and to also discuss a medical release so that the doctor can her her medical records sent from her previous cardiology provider    Pt can be reached at  [phone]

## 2024-03-06 ENCOUNTER — LAB VISIT (OUTPATIENT)
Dept: LAB | Facility: HOSPITAL | Age: 84
End: 2024-03-06
Attending: NURSE PRACTITIONER
Payer: MEDICARE

## 2024-03-06 DIAGNOSIS — C34.31 MALIGNANT NEOPLASM OF LOWER LOBE OF RIGHT LUNG: ICD-10-CM

## 2024-03-06 LAB
ALBUMIN SERPL BCP-MCNC: 3 G/DL (ref 3.5–5.2)
ALP SERPL-CCNC: 40 U/L (ref 55–135)
ALT SERPL W/O P-5'-P-CCNC: 11 U/L (ref 10–44)
ANION GAP SERPL CALC-SCNC: 8 MMOL/L (ref 8–16)
AST SERPL-CCNC: 9 U/L (ref 10–40)
BASOPHILS # BLD AUTO: 0.01 K/UL (ref 0–0.2)
BASOPHILS NFR BLD: 0.2 % (ref 0–1.9)
BILIRUB SERPL-MCNC: 0.2 MG/DL (ref 0.1–1)
BUN SERPL-MCNC: 24 MG/DL (ref 8–23)
CALCIUM SERPL-MCNC: 8.6 MG/DL (ref 8.7–10.5)
CHLORIDE SERPL-SCNC: 100 MMOL/L (ref 95–110)
CO2 SERPL-SCNC: 29 MMOL/L (ref 23–29)
CREAT SERPL-MCNC: 1 MG/DL (ref 0.5–1.4)
DIFFERENTIAL METHOD BLD: ABNORMAL
EOSINOPHIL # BLD AUTO: 0 K/UL (ref 0–0.5)
EOSINOPHIL NFR BLD: 0.2 % (ref 0–8)
ERYTHROCYTE [DISTWIDTH] IN BLOOD BY AUTOMATED COUNT: 18 % (ref 11.5–14.5)
EST. GFR  (NO RACE VARIABLE): 55.9 ML/MIN/1.73 M^2
GLUCOSE SERPL-MCNC: 200 MG/DL (ref 70–110)
HCT VFR BLD AUTO: 28.9 % (ref 37–48.5)
HGB BLD-MCNC: 8.6 G/DL (ref 12–16)
IMM GRANULOCYTES # BLD AUTO: 0.03 K/UL (ref 0–0.04)
IMM GRANULOCYTES NFR BLD AUTO: 0.6 % (ref 0–0.5)
LYMPHOCYTES # BLD AUTO: 0.5 K/UL (ref 1–4.8)
LYMPHOCYTES NFR BLD: 9.6 % (ref 18–48)
MAGNESIUM SERPL-MCNC: 1.7 MG/DL (ref 1.6–2.6)
MCH RBC QN AUTO: 27 PG (ref 27–31)
MCHC RBC AUTO-ENTMCNC: 29.8 G/DL (ref 32–36)
MCV RBC AUTO: 91 FL (ref 82–98)
MONOCYTES # BLD AUTO: 0.7 K/UL (ref 0.3–1)
MONOCYTES NFR BLD: 13.5 % (ref 4–15)
NEUTROPHILS # BLD AUTO: 3.7 K/UL (ref 1.8–7.7)
NEUTROPHILS NFR BLD: 75.9 % (ref 38–73)
NRBC BLD-RTO: 0 /100 WBC
PLATELET # BLD AUTO: 387 K/UL (ref 150–450)
PMV BLD AUTO: 8.8 FL (ref 9.2–12.9)
POTASSIUM SERPL-SCNC: 4.2 MMOL/L (ref 3.5–5.1)
PROT SERPL-MCNC: 6.3 G/DL (ref 6–8.4)
RBC # BLD AUTO: 3.19 M/UL (ref 4–5.4)
SODIUM SERPL-SCNC: 137 MMOL/L (ref 136–145)
WBC # BLD AUTO: 4.89 K/UL (ref 3.9–12.7)

## 2024-03-06 PROCEDURE — 83735 ASSAY OF MAGNESIUM: CPT | Performed by: NURSE PRACTITIONER

## 2024-03-06 PROCEDURE — 36415 COLL VENOUS BLD VENIPUNCTURE: CPT | Performed by: NURSE PRACTITIONER

## 2024-03-06 PROCEDURE — 80053 COMPREHEN METABOLIC PANEL: CPT | Performed by: NURSE PRACTITIONER

## 2024-03-06 PROCEDURE — 85025 COMPLETE CBC W/AUTO DIFF WBC: CPT | Performed by: NURSE PRACTITIONER

## 2024-03-06 RX ORDER — SODIUM CHLORIDE 0.9 % (FLUSH) 0.9 %
10 SYRINGE (ML) INJECTION
Status: CANCELLED | OUTPATIENT
Start: 2024-03-07

## 2024-03-06 RX ORDER — HEPARIN 100 UNIT/ML
500 SYRINGE INTRAVENOUS
Status: CANCELLED | OUTPATIENT
Start: 2024-03-07

## 2024-03-07 ENCOUNTER — INFUSION (OUTPATIENT)
Dept: INFUSION THERAPY | Facility: HOSPITAL | Age: 84
End: 2024-03-07
Attending: INTERNAL MEDICINE
Payer: MEDICARE

## 2024-03-07 VITALS
WEIGHT: 115.31 LBS | HEART RATE: 81 BPM | TEMPERATURE: 99 F | BODY MASS INDEX: 20.43 KG/M2 | HEIGHT: 63 IN | RESPIRATION RATE: 16 BRPM | DIASTOLIC BLOOD PRESSURE: 60 MMHG | OXYGEN SATURATION: 100 % | SYSTOLIC BLOOD PRESSURE: 119 MMHG

## 2024-03-07 DIAGNOSIS — C34.31 PRIMARY MALIGNANT NEOPLASM OF BRONCHUS OF RIGHT LOWER LOBE: Primary | ICD-10-CM

## 2024-03-07 PROCEDURE — 25000003 PHARM REV CODE 250: Performed by: NURSE PRACTITIONER

## 2024-03-07 PROCEDURE — 96367 TX/PROPH/DG ADDL SEQ IV INF: CPT

## 2024-03-07 PROCEDURE — A4216 STERILE WATER/SALINE, 10 ML: HCPCS | Performed by: NURSE PRACTITIONER

## 2024-03-07 PROCEDURE — 96375 TX/PRO/DX INJ NEW DRUG ADDON: CPT

## 2024-03-07 PROCEDURE — 96413 CHEMO IV INFUSION 1 HR: CPT

## 2024-03-07 PROCEDURE — 63600175 PHARM REV CODE 636 W HCPCS: Performed by: NURSE PRACTITIONER

## 2024-03-07 PROCEDURE — 96411 CHEMO IV PUSH ADDL DRUG: CPT

## 2024-03-07 RX ORDER — HEPARIN 100 UNIT/ML
500 SYRINGE INTRAVENOUS
Status: DISCONTINUED | OUTPATIENT
Start: 2024-03-07 | End: 2024-03-07 | Stop reason: HOSPADM

## 2024-03-07 RX ORDER — SODIUM CHLORIDE 0.9 % (FLUSH) 0.9 %
10 SYRINGE (ML) INJECTION
Status: DISCONTINUED | OUTPATIENT
Start: 2024-03-07 | End: 2024-03-07 | Stop reason: HOSPADM

## 2024-03-07 RX ADMIN — Medication 10 ML: at 10:03

## 2024-03-07 RX ADMIN — APREPITANT 130 MG: 130 INJECTION, EMULSION INTRAVENOUS at 08:03

## 2024-03-07 RX ADMIN — SODIUM CHLORIDE: 9 INJECTION, SOLUTION INTRAVENOUS at 08:03

## 2024-03-07 RX ADMIN — PEMETREXED DISODIUM 575 MG: 500 INJECTION, POWDER, LYOPHILIZED, FOR SOLUTION INTRAVENOUS at 08:03

## 2024-03-07 RX ADMIN — DEXAMETHASONE SODIUM PHOSPHATE 0.25 MG: 4 INJECTION, SOLUTION INTRA-ARTICULAR; INTRALESIONAL; INTRAMUSCULAR; INTRAVENOUS; SOFT TISSUE at 08:03

## 2024-03-07 RX ADMIN — HEPARIN 500 UNITS: 100 SYRINGE at 10:03

## 2024-03-07 RX ADMIN — CARBOPLATIN 300 MG: 10 INJECTION, SOLUTION INTRAVENOUS at 09:03

## 2024-03-07 NOTE — PLAN OF CARE
Problem: Fall Injury Risk  Goal: Absence of Fall and Fall-Related Injury  Outcome: Ongoing, Progressing  Intervention: Identify and Manage Contributors  Flowsheets (Taken 3/7/2024 0750)  Self-Care Promotion: independence encouraged  Medication Review/Management: medications reviewed  Intervention: Promote Injury-Free Environment  Flowsheets (Taken 3/7/2024 0750)  Safety Promotion/Fall Prevention: medications reviewed

## 2024-03-09 ENCOUNTER — DOCUMENT SCAN (OUTPATIENT)
Dept: HOME HEALTH SERVICES | Facility: HOSPITAL | Age: 84
End: 2024-03-09
Payer: MEDICARE

## 2024-03-11 PROBLEM — J18.9 PNEUMONIA: Status: RESOLVED | Noted: 2023-12-04 | Resolved: 2024-03-11

## 2024-03-11 PROBLEM — A41.9 SEPSIS: Status: RESOLVED | Noted: 2023-12-05 | Resolved: 2024-03-11

## 2024-03-13 ENCOUNTER — LAB VISIT (OUTPATIENT)
Dept: LAB | Facility: HOSPITAL | Age: 84
End: 2024-03-13
Attending: NURSE PRACTITIONER
Payer: MEDICARE

## 2024-03-13 DIAGNOSIS — C34.31 MALIGNANT NEOPLASM OF LOWER LOBE OF RIGHT LUNG: ICD-10-CM

## 2024-03-13 DIAGNOSIS — Z78.0 MENOPAUSE: ICD-10-CM

## 2024-03-13 LAB
ALBUMIN SERPL BCP-MCNC: 3.2 G/DL (ref 3.5–5.2)
ALP SERPL-CCNC: 39 U/L (ref 55–135)
ALT SERPL W/O P-5'-P-CCNC: 12 U/L (ref 10–44)
ANION GAP SERPL CALC-SCNC: 10 MMOL/L (ref 8–16)
AST SERPL-CCNC: 11 U/L (ref 10–40)
BASOPHILS # BLD AUTO: 0.01 K/UL (ref 0–0.2)
BASOPHILS NFR BLD: 0.4 % (ref 0–1.9)
BILIRUB SERPL-MCNC: 0.3 MG/DL (ref 0.1–1)
BUN SERPL-MCNC: 31 MG/DL (ref 8–23)
CALCIUM SERPL-MCNC: 8.9 MG/DL (ref 8.7–10.5)
CHLORIDE SERPL-SCNC: 102 MMOL/L (ref 95–110)
CO2 SERPL-SCNC: 27 MMOL/L (ref 23–29)
CREAT SERPL-MCNC: 1 MG/DL (ref 0.5–1.4)
DIFFERENTIAL METHOD BLD: ABNORMAL
EOSINOPHIL # BLD AUTO: 0 K/UL (ref 0–0.5)
EOSINOPHIL NFR BLD: 0.4 % (ref 0–8)
ERYTHROCYTE [DISTWIDTH] IN BLOOD BY AUTOMATED COUNT: 18.6 % (ref 11.5–14.5)
EST. GFR  (NO RACE VARIABLE): 55.9 ML/MIN/1.73 M^2
GLUCOSE SERPL-MCNC: 154 MG/DL (ref 70–110)
HCT VFR BLD AUTO: 29.9 % (ref 37–48.5)
HGB BLD-MCNC: 8.8 G/DL (ref 12–16)
IMM GRANULOCYTES # BLD AUTO: 0.06 K/UL (ref 0–0.04)
IMM GRANULOCYTES NFR BLD AUTO: 2.4 % (ref 0–0.5)
LYMPHOCYTES # BLD AUTO: 0.5 K/UL (ref 1–4.8)
LYMPHOCYTES NFR BLD: 21.3 % (ref 18–48)
MAGNESIUM SERPL-MCNC: 1.7 MG/DL (ref 1.6–2.6)
MCH RBC QN AUTO: 26.9 PG (ref 27–31)
MCHC RBC AUTO-ENTMCNC: 29.4 G/DL (ref 32–36)
MCV RBC AUTO: 91 FL (ref 82–98)
MONOCYTES # BLD AUTO: 0.1 K/UL (ref 0.3–1)
MONOCYTES NFR BLD: 4.3 % (ref 4–15)
NEUTROPHILS # BLD AUTO: 1.8 K/UL (ref 1.8–7.7)
NEUTROPHILS NFR BLD: 71.2 % (ref 38–73)
NRBC BLD-RTO: 0 /100 WBC
PLATELET # BLD AUTO: 355 K/UL (ref 150–450)
PMV BLD AUTO: 8.6 FL (ref 9.2–12.9)
POTASSIUM SERPL-SCNC: 4.3 MMOL/L (ref 3.5–5.1)
PROT SERPL-MCNC: 6.9 G/DL (ref 6–8.4)
RBC # BLD AUTO: 3.27 M/UL (ref 4–5.4)
SODIUM SERPL-SCNC: 139 MMOL/L (ref 136–145)
WBC # BLD AUTO: 2.54 K/UL (ref 3.9–12.7)

## 2024-03-13 PROCEDURE — 83735 ASSAY OF MAGNESIUM: CPT | Performed by: NURSE PRACTITIONER

## 2024-03-13 PROCEDURE — 85025 COMPLETE CBC W/AUTO DIFF WBC: CPT | Performed by: NURSE PRACTITIONER

## 2024-03-13 PROCEDURE — 36415 COLL VENOUS BLD VENIPUNCTURE: CPT | Performed by: NURSE PRACTITIONER

## 2024-03-13 PROCEDURE — 80053 COMPREHEN METABOLIC PANEL: CPT | Performed by: NURSE PRACTITIONER

## 2024-03-16 DIAGNOSIS — T45.1X5A PERIPHERAL NEUROPATHY DUE TO CHEMOTHERAPY: ICD-10-CM

## 2024-03-16 DIAGNOSIS — G62.0 PERIPHERAL NEUROPATHY DUE TO CHEMOTHERAPY: ICD-10-CM

## 2024-03-16 DIAGNOSIS — K21.9 GASTROESOPHAGEAL REFLUX DISEASE, UNSPECIFIED WHETHER ESOPHAGITIS PRESENT: ICD-10-CM

## 2024-03-16 NOTE — TELEPHONE ENCOUNTER
No care due was identified.  [facility] Embedded Care Due Messages. Reference number: [id number].   3/16/2024 8:43:33 AM CDT

## 2024-03-18 ENCOUNTER — TELEPHONE (OUTPATIENT)
Dept: PULMONOLOGY | Facility: CLINIC | Age: 84
End: 2024-03-18

## 2024-03-18 ENCOUNTER — HOSPITAL ENCOUNTER (OUTPATIENT)
Dept: RADIOLOGY | Facility: HOSPITAL | Age: 84
Discharge: HOME | End: 2024-03-18
Attending: INTERNAL MEDICINE
Payer: MEDICARE

## 2024-03-18 ENCOUNTER — OFFICE VISIT (OUTPATIENT)
Dept: PULMONOLOGY | Facility: CLINIC | Age: 84
End: 2024-03-18
Payer: MEDICARE

## 2024-03-18 VITALS
HEART RATE: 74 BPM | WEIGHT: 115 LBS | DIASTOLIC BLOOD PRESSURE: 62 MMHG | OXYGEN SATURATION: 97 % | BODY MASS INDEX: 20.37 KG/M2 | SYSTOLIC BLOOD PRESSURE: 130 MMHG

## 2024-03-18 DIAGNOSIS — C34.90 MUCINOUS ADENOCARCINOMA OF LUNG: ICD-10-CM

## 2024-03-18 DIAGNOSIS — J96.11 CHRONIC RESPIRATORY FAILURE WITH HYPOXIA: ICD-10-CM

## 2024-03-18 DIAGNOSIS — C34.90 BRONCHOGENIC CARCINOMA: ICD-10-CM

## 2024-03-18 DIAGNOSIS — C34.90 BRONCHOGENIC CARCINOMA: Primary | ICD-10-CM

## 2024-03-18 PROBLEM — J18.9 MULTIFOCAL PNEUMONIA: Status: RESOLVED | Noted: 2023-12-12 | Resolved: 2024-03-18

## 2024-03-18 PROCEDURE — 71046 X-RAY EXAM CHEST 2 VIEWS: CPT | Mod: TC

## 2024-03-18 PROCEDURE — 3078F DIAST BP <80 MM HG: CPT | Mod: CPTII,S$GLB,, | Performed by: INTERNAL MEDICINE

## 2024-03-18 PROCEDURE — 3075F SYST BP GE 130 - 139MM HG: CPT | Mod: CPTII,S$GLB,, | Performed by: INTERNAL MEDICINE

## 2024-03-18 PROCEDURE — 1159F MED LIST DOCD IN RCRD: CPT | Mod: CPTII,S$GLB,, | Performed by: INTERNAL MEDICINE

## 2024-03-18 PROCEDURE — 1157F ADVNC CARE PLAN IN RCRD: CPT | Mod: CPTII,S$GLB,, | Performed by: INTERNAL MEDICINE

## 2024-03-18 PROCEDURE — 99214 OFFICE O/P EST MOD 30 MIN: CPT | Mod: S$GLB,,, | Performed by: INTERNAL MEDICINE

## 2024-03-18 PROCEDURE — 1126F AMNT PAIN NOTED NONE PRSNT: CPT | Mod: CPTII,S$GLB,, | Performed by: INTERNAL MEDICINE

## 2024-03-18 RX ORDER — LANOLIN ALCOHOL/MO/W.PET/CERES
400 CREAM (GRAM) TOPICAL DAILY
Qty: 90 TABLET | Refills: 3 | OUTPATIENT
Start: 2024-03-18

## 2024-03-18 RX ORDER — OMEPRAZOLE 40 MG/1
40 CAPSULE, DELAYED RELEASE ORAL DAILY
Qty: 90 CAPSULE | Refills: 3 | OUTPATIENT
Start: 2024-03-18 | End: 2025-03-18

## 2024-03-18 RX ORDER — GABAPENTIN 100 MG/1
100 CAPSULE ORAL 2 TIMES DAILY
Qty: 180 CAPSULE | Refills: 3 | OUTPATIENT
Start: 2024-03-18

## 2024-03-18 NOTE — PROGRESS NOTES
SUBJECTIVE:    Patient ID: Alexa Otero is a 84 y.o. female.    Chief Complaint: Follow-up (Follow up SOB)    HPI   The patient returns feeling extremely tired. She is on 2 liters of oxygen.  She is still coughing up mucus, but it is much less.  She is tolerating her chemotherapy.  She is anorectic. She is holding her weight, though.  Past Medical History:   Diagnosis Date    Arthritis     Cardiac arrest 10/2023    Cataract     Colon polyp     Diabetes mellitus type II 2012    Encounter for blood transfusion     Extra-ovarian endometrioid adenocarcinoma 2020    GERD (gastroesophageal reflux disease)     History of chronic cough     clear productive    Hyperlipidemia     Hypertension     Macular degeneration     Ovarian cancer     PONV (postoperative nausea and vomiting)     Squamous cell carcinoma 1980's    precancer of cervix     Past Surgical History:   Procedure Laterality Date    AUGMENTATION OF BREAST      BRONCHOSCOPY N/A 12/12/2023    Procedure: BRONCHOSCOPY;  Surgeon: Neva Christian MD;  Location: [facility];  Service: ENT;  Laterality: N/A;    BRONCHOSCOPY WITH FLUOROSCOPY Right 05/11/2023    Procedure: BRONCHOSCOPY, WITH FLUOROSCOPY;  Surgeon: Neva Christian MD;  Location: [facility];  Service: Pulmonary;  Laterality: Right;    BRONCHOSCOPY WITH FLUOROSCOPY N/A 12/15/2023    Procedure: BRONCHOSCOPY, WITH FLUOROSCOPY;  Surgeon: Neva Christian MD;  Location: [facility];  Service: Pulmonary;  Laterality: N/A;    CATARACT EXTRACTION BILATERAL W/ ANTERIOR VITRECTOMY Bilateral     CHOLECYSTECTOMY      COLONOSCOPY      COLONOSCOPY N/A 04/20/2023    Procedure: COLONOSCOPY;  Surgeon: Sabino Stephenson MD;  Location: [facility];  Service: Endoscopy;  Laterality: N/A;    CORONARY STENT PLACEMENT  10/2023    x 3    ESOPHAGOGASTRODUODENOSCOPY N/A 06/20/2018    Procedure: EGD (ESOPHAGOGASTRODUODENOSCOPY);  Surgeon: Sabino Stephenson MD;  Location: [facility];  Service: Endoscopy;  Laterality: N/A;     ESOPHAGOGASTRODUODENOSCOPY N/A 03/31/2023    Procedure: EGD (ESOPHAGOGASTRODUODENOSCOPY);  Surgeon: Sabino Stephenson MD;  Location: [facility];  Service: Endoscopy;  Laterality: N/A;    ESOPHAGOGASTRODUODENOSCOPY N/A 12/11/2023    Procedure: EGD (ESOPHAGOGASTRODUODENOSCOPY);  Surgeon: Pretty Salgado MD;  Location: [facility];  Service: Endoscopy;  Laterality: N/A;    HYSTERECTOMY  1976    partial    INJECTION OF ANESTHETIC AGENT AROUND MEDIAL BRANCH NERVES INNERVATING LUMBAR FACET JOINT Right 05/10/2023    Procedure: Block-nerve-Lateral-branch-lumbar;  Surgeon: Agustin Robles MD;  Location: [facility] OR;  Service: Pain Management;  Laterality: Right;  L5 and s1,s2 LBB    INJECTION OF ANESTHETIC AGENT AROUND MEDIAL BRANCH NERVES INNERVATING LUMBAR FACET JOINT Right 05/30/2023    Procedure: Block-nerve-medial branch-lumbar;  Surgeon: Agustin Robles MD;  Location[facility];  Service: Pain Management;  Laterality: Right;  L5, s1 ,s2 LBB #2    INJECTION OF ANESTHETIC AGENT AROUND NERVE Right 10/31/2023    Procedure: INTERCOSTAL NERVE BLOCK;  Surgeon: Washington Shankar MD;  Location: [facility];  Service: Cardiothoracic;  Laterality: Right;    INJECTION, SACROILIAC JOINT Right 01/26/2023    Procedure: INJECTION,SACROILIAC JOINT;  Surgeon: Agustin Robles MD;  Location: [facility] OR;  Service: Pain Management;  Laterality: Right;    INSERTION OF TUNNELED CENTRAL VENOUS CATHETER (CVC) WITH SUBCUTANEOUS PORT Right 10/19/2020    Procedure: INSERTION, PORT-A-CATH;  Surgeon: Misti Mcfarland MD;  Location: [facility] OR;  Service: General;  Laterality: Right;    INTRAMEDULLARY RODDING OF TROCHANTER OF FEMUR Right 11/28/2021    Procedure: INSERTION, INTRAMEDULLARY LUC, FEMUR, TROCHANTER/RIGHT TFN DR FAJARDO NOTIFIED REP;  Surgeon: Kp Fajardo MD;  Location: [facility];  Service: Orthopedics;  Laterality: Right;  SKIP    ROBOT-ASSISTED LAPAROSCOPIC LYMPHADENECTOMY USING DA NELLA XI N/A 09/21/2020    Procedure: XI ROBOTIC LYMPHADENECTOMY-pelvic and  kell-aortic;  Surgeon: Altagracia Ray MD;  Location: [facility] OR;  Service: OB/GYN;  Laterality: N/A;    ROBOT-ASSISTED LAPAROSCOPIC OMENTECTOMY USING DA NELLA XI N/A 09/21/2020    Procedure: XI ROBOTIC OMENTECTOMY;  Surgeon: Altagracia Ray MD;  Location: [facility] OR;  Service: OB/GYN;  Laterality: N/A;    ROBOT-ASSISTED LAPAROSCOPIC SALPINGO-OOPHORECTOMY USING DA NELLA XI Bilateral 09/21/2020    Procedure: XI ROBOTIC SALPINGO-OOPHORECTOMY;  Surgeon: Altagracia Ray MD;  Location: [facility] OR;  Service: OB/GYN;  Laterality: Bilateral;    THORACOSCOPIC BIOPSY OF PLEURA Right 10/31/2023    Procedure: VATS, WITH PLEURA BIOPSY;  Surgeon: Washington Shankar MD;  Location: [facility] OR;  Service: Cardiothoracic;  Laterality: Right;  FROZEN SECTION   **KRISTEN-ASSISDT**    THORACOTOMY Right 10/31/2023    Procedure: THORACOTOMY;  Surgeon: Washington Shankar MD;  Location: [facility];  Service: Cardiothoracic;  Laterality: Right;    UPPER GASTROINTESTINAL ENDOSCOPY       Family History   Problem Relation Age of Onset    Cancer Mother 57        lung    Cancer Father 56        oral    Skin cancer Sister     Skin cancer Brother     Melanoma Brother     Skin cancer Brother     Breast cancer Maternal Grandmother     Breast cancer Paternal Grandmother     Colon polyps Daughter     Psoriasis Neg Hx     Lupus Neg Hx     Eczema Neg Hx     Colon cancer Neg Hx     Crohn's disease Neg Hx     Esophageal cancer Neg Hx     Stomach cancer Neg Hx     Ulcerative colitis Neg Hx         Social History:   Marital Status:   Occupation: Data Unavailable  Alcohol History:  reports current alcohol use of about 1.0 standard drink of alcohol per week.  Tobacco History:  reports that she quit smoking about 43 years ago. Her smoking use included cigarettes. She started smoking about 63 years ago. She has a 20.0 pack-year smoking history. She has never used smokeless tobacco.  Drug History:  reports no history of drug use.    Review of patient's allergies  indicates:  No Known Allergies    Current Outpatient Medications   Medication Sig Dispense Refill    amiodarone (PACERONE) 200 MG Tab Take 1 tablet (200 mg total) by mouth 2 (two) times daily. 60 tablet 11    aspirin (ECOTRIN) 81 MG EC tablet Take 81 mg by mouth once daily.      benazepriL (LOTENSIN) 40 MG tablet 1/2 po qd 45 tablet 3    clopidogreL (PLAVIX) 75 mg tablet Take 1 tablet (75 mg total) by mouth once daily.      folic acid (FOLVITE) 1 MG tablet Take 1 tablet (1 mg total) by mouth once daily. 100 tablet 3    gabapentin (NEURONTIN) 100 MG capsule Take 1 capsule (100 mg total) by mouth 2 (two) times daily. 180 capsule 3    LORazepam (ATIVAN) 1 MG tablet Take 1 tablet (1 mg total) by mouth every 6 (six) hours as needed for Anxiety (insomnia). 30 tablet 2    melatonin 5 mg Chew Take 1 tablet by mouth nightly as needed (insomnia).      metoprolol succinate (TOPROL-XL) 25 MG 24 hr tablet Take 25 mg by mouth once daily.      omeprazole (PRILOSEC) 40 MG capsule Take 1 capsule (40 mg total) by mouth once daily. 90 capsule 3    ondansetron (ZOFRAN) 8 MG tablet Take 1 tablet (8 mg total) by mouth every 8 (eight) hours as needed. 30 tablet 5    potassium chloride (K-TAB) 20 mEq Take 1 tablet by mouth once daily.      promethazine (PHENERGAN) 25 MG tablet Take 1 tablet (25 mg total) by mouth every 4 (four) hours as needed for Nausea. 30 tablet 5    VIT A/VIT C/VIT E/ZINC/COPPER (ICAPS AREDS ORAL) Take 1 capsule by mouth 2 (two) times a day.       albuterol (VENTOLIN HFA) 90 mcg/actuation inhaler Inhale 2 puffs into the lungs every 4 (four) hours as needed for Wheezing or Shortness of Breath. Rescue 6.7 g 1    HYDROcodone-acetaminophen (NORCO) 5-325 mg per tablet Take 1 tablet by mouth every 6 (six) hours as needed for Pain.      magnesium oxide (MAG-OX) 400 mg (241.3 mg magnesium) tablet Take 1 tablet (400 mg total) by mouth once daily. Patient requested refill (Patient not taking: Reported on 3/18/2024) 90 tablet 3      Current Facility-Administered Medications   Medication Dose Route Frequency Provider Last Rate Last Admin    furosemide injection 20 mg  20 mg Intravenous Once Patito Gibson, NP-C           Last PFT: 5/11/23 FEV1 1.72, no obstruction, no restriction, moderate diffusion defect.    Last CT:3/18/23  IMPRESSION:  No CT evidence for pulmonary embolism.   Focal area of airspace opacity/consolidation involving the posterior segment right lower lobe no significantly changed when compared to prior study.   Emphysematous changes.   Prominent right hilar and subcarinal lymph nodes, may be reactive in nature.   Inferior deformity with minimal increase sclerosis at T11 perhaps suggesting old compression deformity.    PET/CT  8/15/23  IMPRESSION:  1. Multifocal right lower lobe and right pleural FDG hypermetabolism as described, with associated right lower lobe consolidation. These are nonspecific and could be of infectious or inflammatory etiology in the clinical setting of chronic pneumonia, and or metastatic disease. Correlation with previous bronchoscopy results is needed.  2. Multiple new non hypermetabolic pulmonary nodules in both lungs, which are generally below size resolution for PET, but suspicious for pulmonary metastases.  3. No additional findings of FDG avid metastatic disease.      Review of Systems  General: Feeling ok  Eyes: Vision is good. She has macular degeneration.  ENT:  No sinusitis or pharyngitis.   Heart:: No chest pain or palpitations.  Lungs: Coughs, produces clear mucus.  She isstill producing a great quantity of clear mucus through the day and night  GI:  Back to alternating diarrhea and constipation  : No dysuria, hesitancy, or nocturia.  Musculoskeletal: has R hip pain  Skin: No lesions or rashes.  Neuro: No headaches or neuropathy.  Lymph: No edema or adenopathy.  Psych: No anxiety or depression.  Endo:  her weight is stable    OBJECTIVE:      /62 (BP Location: Right arm, Patient  Position: Sitting, BP Method: Medium (Manual))   Pulse 74   Wt 52.2 kg (115 lb)   SpO2 97% Comment: On 2 liters of Oxygen  BMI 20.37 kg/m²     Physical Exam  GENERAL: Older patient in no distress.  HEENT: Pupils equal and reactive. Extraocular movements intact. Nose intact.      Pharynx moist.  NECK: Supple.   HEART: Regular rate and rhythm. No murmur or gallop auscultated.  LUNGS:  There are decreased breath sounds in the right base.   Lung excursion symmetrical. No change in fremitus.  ABDOMEN: Bowel sounds present. Non-tender, no masses palpated.  EXTREMITIES: Normal muscle tone and joint movement, no cyanosis or clubbing.   LYMPHATICS: No adenopathy palpated, no edema.  SKIN: Dry, intact, no lesions.   NEURO: Cranial nerves II-XII intact. Motor strength 5/5 bilaterally, upper and lower extremities.  PSYCH: Appropriate affect.          Assessment:       1. Bronchogenic carcinoma    2. Chronic respiratory failure with hypoxia    3. Mucinous adenocarcinoma of lung              Plan:       Bronchogenic carcinoma  -     X-Ray Chest PA And Lateral; Future    Chronic respiratory failure with hypoxia    Mucinous adenocarcinoma of lung        The patient knows to call if she needs anything  CXR  Follow up in about 3 months (around 6/18/2024).

## 2024-03-18 NOTE — TELEPHONE ENCOUNTER
Chest x-ray shows progression of pleural fluid with a large apical cap of fluid now.  There is some clearing in the right lower thorax.  Told her.

## 2024-03-19 ENCOUNTER — PATIENT MESSAGE (OUTPATIENT)
Dept: FAMILY MEDICINE | Facility: CLINIC | Age: 84
End: 2024-03-19
Payer: MEDICARE

## 2024-03-20 ENCOUNTER — LAB VISIT (OUTPATIENT)
Dept: LAB | Facility: HOSPITAL | Age: 84
End: 2024-03-20
Attending: NURSE PRACTITIONER
Payer: MEDICARE

## 2024-03-20 ENCOUNTER — TELEPHONE (OUTPATIENT)
Dept: HEMATOLOGY/ONCOLOGY | Facility: CLINIC | Age: 84
End: 2024-03-20

## 2024-03-20 ENCOUNTER — OFFICE VISIT (OUTPATIENT)
Dept: FAMILY MEDICINE | Facility: CLINIC | Age: 84
End: 2024-03-20
Payer: MEDICARE

## 2024-03-20 ENCOUNTER — OFFICE VISIT (OUTPATIENT)
Dept: CARDIOLOGY | Facility: CLINIC | Age: 84
End: 2024-03-20
Payer: MEDICARE

## 2024-03-20 VITALS
HEIGHT: 63 IN | SYSTOLIC BLOOD PRESSURE: 130 MMHG | WEIGHT: 114.88 LBS | TEMPERATURE: 99 F | BODY MASS INDEX: 20.36 KG/M2 | OXYGEN SATURATION: 96 % | DIASTOLIC BLOOD PRESSURE: 60 MMHG | HEART RATE: 86 BPM

## 2024-03-20 VITALS
DIASTOLIC BLOOD PRESSURE: 89 MMHG | BODY MASS INDEX: 20.31 KG/M2 | HEIGHT: 63 IN | HEART RATE: 82 BPM | SYSTOLIC BLOOD PRESSURE: 152 MMHG | WEIGHT: 114.63 LBS

## 2024-03-20 DIAGNOSIS — Z95.820 S/P ANGIOPLASTY WITH STENT: ICD-10-CM

## 2024-03-20 DIAGNOSIS — K21.9 GASTROESOPHAGEAL REFLUX DISEASE, UNSPECIFIED WHETHER ESOPHAGITIS PRESENT: ICD-10-CM

## 2024-03-20 DIAGNOSIS — C56.1 CARCINOMA OF RIGHT OVARY: ICD-10-CM

## 2024-03-20 DIAGNOSIS — Z90.2 STATUS POST LOBECTOMY OF LUNG: ICD-10-CM

## 2024-03-20 DIAGNOSIS — T45.1X5A PERIPHERAL NEUROPATHY DUE TO CHEMOTHERAPY: ICD-10-CM

## 2024-03-20 DIAGNOSIS — I25.10 CORONARY ARTERY DISEASE, UNSPECIFIED VESSEL OR LESION TYPE, UNSPECIFIED WHETHER ANGINA PRESENT, UNSPECIFIED WHETHER NATIVE OR TRANSPLANTED HEART: ICD-10-CM

## 2024-03-20 DIAGNOSIS — J44.9 CHRONIC OBSTRUCTIVE PULMONARY DISEASE, UNSPECIFIED COPD TYPE: ICD-10-CM

## 2024-03-20 DIAGNOSIS — M46.1 SACROILIITIS, NOT ELSEWHERE CLASSIFIED: ICD-10-CM

## 2024-03-20 DIAGNOSIS — E78.5 HYPERLIPIDEMIA ASSOCIATED WITH TYPE 2 DIABETES MELLITUS: ICD-10-CM

## 2024-03-20 DIAGNOSIS — E11.69 HYPERLIPIDEMIA ASSOCIATED WITH TYPE 2 DIABETES MELLITUS: ICD-10-CM

## 2024-03-20 DIAGNOSIS — Z99.81 CHRONIC RESPIRATORY FAILURE WITH HYPOXIA, ON HOME O2 THERAPY: ICD-10-CM

## 2024-03-20 DIAGNOSIS — J96.11 CHRONIC RESPIRATORY FAILURE WITH HYPOXIA, ON HOME O2 THERAPY: ICD-10-CM

## 2024-03-20 DIAGNOSIS — D61.818 PANCYTOPENIA: ICD-10-CM

## 2024-03-20 DIAGNOSIS — C34.31 MALIGNANT NEOPLASM OF LOWER LOBE OF RIGHT LUNG: ICD-10-CM

## 2024-03-20 DIAGNOSIS — E11.22 CONTROLLED TYPE 2 DIABETES MELLITUS WITH STAGE 3 CHRONIC KIDNEY DISEASE, WITHOUT LONG-TERM CURRENT USE OF INSULIN: ICD-10-CM

## 2024-03-20 DIAGNOSIS — I15.2 HYPERTENSION ASSOCIATED WITH DIABETES: ICD-10-CM

## 2024-03-20 DIAGNOSIS — D64.9 SYMPTOMATIC ANEMIA: Primary | ICD-10-CM

## 2024-03-20 DIAGNOSIS — E11.59 HYPERTENSION ASSOCIATED WITH DIABETES: ICD-10-CM

## 2024-03-20 DIAGNOSIS — D64.9 SYMPTOMATIC ANEMIA: ICD-10-CM

## 2024-03-20 DIAGNOSIS — G62.0 PERIPHERAL NEUROPATHY DUE TO CHEMOTHERAPY: ICD-10-CM

## 2024-03-20 DIAGNOSIS — I15.2 HYPERTENSION ASSOCIATED WITH DIABETES: Primary | ICD-10-CM

## 2024-03-20 DIAGNOSIS — I48.0 PAROXYSMAL ATRIAL FIBRILLATION: ICD-10-CM

## 2024-03-20 DIAGNOSIS — I70.0 ATHEROSCLEROSIS OF AORTA: ICD-10-CM

## 2024-03-20 DIAGNOSIS — N18.30 CONTROLLED TYPE 2 DIABETES MELLITUS WITH STAGE 3 CHRONIC KIDNEY DISEASE, WITHOUT LONG-TERM CURRENT USE OF INSULIN: ICD-10-CM

## 2024-03-20 DIAGNOSIS — E11.59 HYPERTENSION ASSOCIATED WITH DIABETES: Primary | ICD-10-CM

## 2024-03-20 DIAGNOSIS — C34.31 PRIMARY MALIGNANT NEOPLASM OF BRONCHUS OF RIGHT LOWER LOBE: Primary | ICD-10-CM

## 2024-03-20 LAB
ABO + RH BLD: NORMAL
ALBUMIN SERPL BCP-MCNC: 3.3 G/DL (ref 3.5–5.2)
ALP SERPL-CCNC: 39 U/L (ref 55–135)
ALT SERPL W/O P-5'-P-CCNC: 12 U/L (ref 10–44)
ANION GAP SERPL CALC-SCNC: 11 MMOL/L (ref 8–16)
AST SERPL-CCNC: 12 U/L (ref 10–40)
BASOPHILS # BLD AUTO: 0 K/UL (ref 0–0.2)
BASOPHILS NFR BLD: 0 % (ref 0–1.9)
BILIRUB SERPL-MCNC: 0.4 MG/DL (ref 0.1–1)
BLD GP AB SCN CELLS X3 SERPL QL: NORMAL
BUN SERPL-MCNC: 23 MG/DL (ref 8–23)
CALCIUM SERPL-MCNC: 9.2 MG/DL (ref 8.7–10.5)
CHLORIDE SERPL-SCNC: 102 MMOL/L (ref 95–110)
CO2 SERPL-SCNC: 26 MMOL/L (ref 23–29)
CREAT SERPL-MCNC: 1.2 MG/DL (ref 0.5–1.4)
DIFFERENTIAL METHOD BLD: ABNORMAL
EOSINOPHIL # BLD AUTO: 0 K/UL (ref 0–0.5)
EOSINOPHIL NFR BLD: 0 % (ref 0–8)
ERYTHROCYTE [DISTWIDTH] IN BLOOD BY AUTOMATED COUNT: 18.4 % (ref 11.5–14.5)
EST. GFR  (NO RACE VARIABLE): 44.9 ML/MIN/1.73 M^2
GLUCOSE SERPL-MCNC: 149 MG/DL (ref 70–110)
HCT VFR BLD AUTO: 25.3 % (ref 37–48.5)
HGB BLD-MCNC: 7.7 G/DL (ref 12–16)
IMM GRANULOCYTES # BLD AUTO: 0.04 K/UL (ref 0–0.04)
IMM GRANULOCYTES NFR BLD AUTO: 1 % (ref 0–0.5)
LYMPHOCYTES # BLD AUTO: 0.6 K/UL (ref 1–4.8)
LYMPHOCYTES NFR BLD: 16.3 % (ref 18–48)
MAGNESIUM SERPL-MCNC: 1.8 MG/DL (ref 1.6–2.6)
MCH RBC QN AUTO: 27.4 PG (ref 27–31)
MCHC RBC AUTO-ENTMCNC: 30.4 G/DL (ref 32–36)
MCV RBC AUTO: 90 FL (ref 82–98)
MONOCYTES # BLD AUTO: 0.9 K/UL (ref 0.3–1)
MONOCYTES NFR BLD: 24.1 % (ref 4–15)
NEUTROPHILS # BLD AUTO: 2.2 K/UL (ref 1.8–7.7)
NEUTROPHILS NFR BLD: 58.6 % (ref 38–73)
NRBC BLD-RTO: 0 /100 WBC
PLATELET # BLD AUTO: 103 K/UL (ref 150–450)
PLATELET BLD QL SMEAR: ABNORMAL
PMV BLD AUTO: 9.3 FL (ref 9.2–12.9)
POTASSIUM SERPL-SCNC: 4.7 MMOL/L (ref 3.5–5.1)
PROT SERPL-MCNC: 7.2 G/DL (ref 6–8.4)
RBC # BLD AUTO: 2.81 M/UL (ref 4–5.4)
SODIUM SERPL-SCNC: 139 MMOL/L (ref 136–145)
SPECIMEN OUTDATE: NORMAL
WBC # BLD AUTO: 3.81 K/UL (ref 3.9–12.7)

## 2024-03-20 PROCEDURE — 1159F MED LIST DOCD IN RCRD: CPT | Mod: HCNC,CPTII,S$GLB, | Performed by: INTERNAL MEDICINE

## 2024-03-20 PROCEDURE — 1159F MED LIST DOCD IN RCRD: CPT | Mod: HCNC,CPTII,S$GLB, | Performed by: FAMILY MEDICINE

## 2024-03-20 PROCEDURE — 1157F ADVNC CARE PLAN IN RCRD: CPT | Mod: HCNC,CPTII,S$GLB, | Performed by: FAMILY MEDICINE

## 2024-03-20 PROCEDURE — 3288F FALL RISK ASSESSMENT DOCD: CPT | Mod: HCNC,CPTII,S$GLB, | Performed by: FAMILY MEDICINE

## 2024-03-20 PROCEDURE — 86901 BLOOD TYPING SEROLOGIC RH(D): CPT | Performed by: NURSE PRACTITIONER

## 2024-03-20 PROCEDURE — 99999 PR PBB SHADOW E&M-EST. PATIENT-LVL IV: CPT | Mod: PBBFAC,HCNC,, | Performed by: FAMILY MEDICINE

## 2024-03-20 PROCEDURE — 86920 COMPATIBILITY TEST SPIN: CPT | Performed by: NURSE PRACTITIONER

## 2024-03-20 PROCEDURE — 85025 COMPLETE CBC W/AUTO DIFF WBC: CPT | Performed by: NURSE PRACTITIONER

## 2024-03-20 PROCEDURE — 1101F PT FALLS ASSESS-DOCD LE1/YR: CPT | Mod: HCNC,CPTII,S$GLB, | Performed by: INTERNAL MEDICINE

## 2024-03-20 PROCEDURE — 3288F FALL RISK ASSESSMENT DOCD: CPT | Mod: HCNC,CPTII,S$GLB, | Performed by: INTERNAL MEDICINE

## 2024-03-20 PROCEDURE — 99214 OFFICE O/P EST MOD 30 MIN: CPT | Mod: HCNC,S$GLB,, | Performed by: FAMILY MEDICINE

## 2024-03-20 PROCEDURE — 83735 ASSAY OF MAGNESIUM: CPT | Performed by: NURSE PRACTITIONER

## 2024-03-20 PROCEDURE — 1157F ADVNC CARE PLAN IN RCRD: CPT | Mod: HCNC,CPTII,S$GLB, | Performed by: INTERNAL MEDICINE

## 2024-03-20 PROCEDURE — 99999 PR PBB SHADOW E&M-EST. PATIENT-LVL IV: CPT | Mod: PBBFAC,HCNC,, | Performed by: INTERNAL MEDICINE

## 2024-03-20 PROCEDURE — 99215 OFFICE O/P EST HI 40 MIN: CPT | Mod: HCNC,S$GLB,, | Performed by: INTERNAL MEDICINE

## 2024-03-20 PROCEDURE — 3079F DIAST BP 80-89 MM HG: CPT | Mod: HCNC,CPTII,S$GLB, | Performed by: INTERNAL MEDICINE

## 2024-03-20 PROCEDURE — 36415 COLL VENOUS BLD VENIPUNCTURE: CPT | Performed by: NURSE PRACTITIONER

## 2024-03-20 PROCEDURE — 3075F SYST BP GE 130 - 139MM HG: CPT | Mod: HCNC,CPTII,S$GLB, | Performed by: FAMILY MEDICINE

## 2024-03-20 PROCEDURE — 1126F AMNT PAIN NOTED NONE PRSNT: CPT | Mod: HCNC,CPTII,S$GLB, | Performed by: FAMILY MEDICINE

## 2024-03-20 PROCEDURE — 80053 COMPREHEN METABOLIC PANEL: CPT | Performed by: NURSE PRACTITIONER

## 2024-03-20 PROCEDURE — 1126F AMNT PAIN NOTED NONE PRSNT: CPT | Mod: HCNC,CPTII,S$GLB, | Performed by: INTERNAL MEDICINE

## 2024-03-20 PROCEDURE — 3077F SYST BP >= 140 MM HG: CPT | Mod: HCNC,CPTII,S$GLB, | Performed by: INTERNAL MEDICINE

## 2024-03-20 PROCEDURE — 1160F RVW MEDS BY RX/DR IN RCRD: CPT | Mod: HCNC,CPTII,S$GLB, | Performed by: INTERNAL MEDICINE

## 2024-03-20 PROCEDURE — 3078F DIAST BP <80 MM HG: CPT | Mod: HCNC,CPTII,S$GLB, | Performed by: FAMILY MEDICINE

## 2024-03-20 PROCEDURE — 1160F RVW MEDS BY RX/DR IN RCRD: CPT | Mod: HCNC,CPTII,S$GLB, | Performed by: FAMILY MEDICINE

## 2024-03-20 PROCEDURE — 1101F PT FALLS ASSESS-DOCD LE1/YR: CPT | Mod: HCNC,CPTII,S$GLB, | Performed by: FAMILY MEDICINE

## 2024-03-20 PROCEDURE — G2211 COMPLEX E/M VISIT ADD ON: HCPCS | Mod: HCNC,S$GLB,, | Performed by: FAMILY MEDICINE

## 2024-03-20 PROCEDURE — 93000 ELECTROCARDIOGRAM COMPLETE: CPT | Mod: HCNC,S$GLB,, | Performed by: GENERAL PRACTICE

## 2024-03-20 RX ORDER — LANOLIN ALCOHOL/MO/W.PET/CERES
400 CREAM (GRAM) TOPICAL DAILY
Qty: 90 TABLET | Refills: 3 | Status: CANCELLED | OUTPATIENT
Start: 2024-03-20

## 2024-03-20 RX ORDER — CLOPIDOGREL BISULFATE 75 MG/1
75 TABLET ORAL DAILY
Qty: 90 TABLET | Refills: 2 | Status: SHIPPED | OUTPATIENT
Start: 2024-03-20

## 2024-03-20 RX ORDER — BENAZEPRIL HYDROCHLORIDE 40 MG/1
20 TABLET ORAL DAILY
Qty: 45 TABLET | Refills: 3 | Status: SHIPPED | OUTPATIENT
Start: 2024-03-20

## 2024-03-20 RX ORDER — GABAPENTIN 100 MG/1
100 CAPSULE ORAL 2 TIMES DAILY
Qty: 180 CAPSULE | Refills: 3 | Status: SHIPPED | OUTPATIENT
Start: 2024-03-20

## 2024-03-20 RX ORDER — FUROSEMIDE 10 MG/ML
20 INJECTION INTRAMUSCULAR; INTRAVENOUS ONCE
Status: CANCELLED | OUTPATIENT
Start: 2024-03-20

## 2024-03-20 RX ORDER — DIPHENHYDRAMINE HYDROCHLORIDE 50 MG/ML
25 INJECTION INTRAMUSCULAR; INTRAVENOUS ONCE
Status: CANCELLED | OUTPATIENT
Start: 2024-03-20

## 2024-03-20 RX ORDER — HYDROCODONE BITARTRATE AND ACETAMINOPHEN 500; 5 MG/1; MG/1
TABLET ORAL ONCE
Status: CANCELLED | OUTPATIENT
Start: 2024-03-20 | End: 2024-03-20

## 2024-03-20 RX ORDER — ACETAMINOPHEN 325 MG/1
650 TABLET ORAL ONCE
Status: CANCELLED | OUTPATIENT
Start: 2024-03-20

## 2024-03-20 RX ORDER — LANOLIN ALCOHOL/MO/W.PET/CERES
400 CREAM (GRAM) TOPICAL DAILY
Qty: 90 TABLET | Refills: 3 | Status: SHIPPED | OUTPATIENT
Start: 2024-03-20

## 2024-03-20 NOTE — TELEPHONE ENCOUNTER
----- Message from JEREMIAH Hussein sent at 3/20/2024 11:41 AM CDT -----  Please notify the patient that their hgb dropped again. It is due to the chemo. Make sure no bleeding.  Can transfuse again if she is having fatigue and worsening SOB on exertion.

## 2024-03-20 NOTE — PROGRESS NOTES
Please notify the patient that their hgb dropped again. It is due to the chemo. Make sure no bleeding.  Can transfuse again if she is having fatigue and worsening SOB on exertion.

## 2024-03-20 NOTE — PROGRESS NOTES
Subjective:    Patient ID:  Alexa Otero is a 84 y.o. female patient here for evaluation Atrial Fibrillation, Hypertension, and Hyperlipidemia      History of Present Illness:     84-year-old female came here for establishment of care.  This patient is new to me.  She was following up with Dr. Julio in [place].  She wants to establish care in [place] from now on.  Patient with past medical history of diabetes, hypertension, hyperlipidemia, lung cancer status post right lower lobectomy.  Patient stated s\he was evaluated by Cardiology for preop for lung cancer surgery.  She had an angiogram and required 3 stents in September/October 2023.  Records from Dr. Hendrix office reviewed.  Normal EF on echo. After the stent she had VFib arrest in recovery presumably secondary to hypomagnesemia and she is currently on supplementation.  She was also evaluated by Cardiology in [facility] for paroxysmal atrial fibrillation.  She was prescribed Eliquis at the time however currently she is not taking Eliquis.  She is on aspirin and Plavix and takes medications regularly.  She is on home oxygen since the surgery.  She has 1 more chemotherapy session pending next month.  She is following up with Hematology/Oncology and pulmonology.  She denies any anginal symptoms currently.  She has occasional dependent ankle edema.      Review of patient's allergies indicates:  No Known Allergies    Past Medical History:   Diagnosis Date    Arthritis     Cardiac arrest 10/2023    Cataract     Colon polyp     Diabetes mellitus type II 2012    Encounter for blood transfusion     Extra-ovarian endometrioid adenocarcinoma 2020    GERD (gastroesophageal reflux disease)     History of chronic cough     clear productive    Hyperlipidemia     Hypertension     Macular degeneration     Ovarian cancer     PONV (postoperative nausea and vomiting)     Squamous cell carcinoma 1980's    precancer of cervix     Past Surgical History:    Procedure Laterality Date    AUGMENTATION OF BREAST      BRONCHOSCOPY N/A 12/12/2023    Procedure: BRONCHOSCOPY;  Surgeon: Neva Christian MD;  Location: [facility] ENDO;  Service: ENT;  Laterality: N/A;    BRONCHOSCOPY WITH FLUOROSCOPY Right 05/11/2023    Procedure: BRONCHOSCOPY, WITH FLUOROSCOPY;  Surgeon: Neva Christian MD;  Location: [facility] ENDO;  Service: Pulmonary;  Laterality: Right;    BRONCHOSCOPY WITH FLUOROSCOPY N/A 12/15/2023    Procedure: BRONCHOSCOPY, WITH FLUOROSCOPY;  Surgeon: Neva Christian MD;  Location: [facility] ENDO;  Service: Pulmonary;  Laterality: N/A;    CATARACT EXTRACTION BILATERAL W/ ANTERIOR VITRECTOMY Bilateral     CHOLECYSTECTOMY      COLONOSCOPY      COLONOSCOPY N/A 04/20/2023    Procedure: COLONOSCOPY;  Surgeon: Sabino Stephenson MD;  Location: [facility];  Service: Endoscopy;  Laterality: N/A;    CORONARY STENT PLACEMENT  10/2023    x 3    ESOPHAGOGASTRODUODENOSCOPY N/A 06/20/2018    Procedure: EGD (ESOPHAGOGASTRODUODENOSCOPY);  Surgeon: Sabino Stephenson MD;  Location: [facility];  Service: Endoscopy;  Laterality: N/A;    ESOPHAGOGASTRODUODENOSCOPY N/A 03/31/2023    Procedure: EGD (ESOPHAGOGASTRODUODENOSCOPY);  Surgeon: Sabino Stephenson MD;  Location: [facility];  Service: Endoscopy;  Laterality: N/A;    ESOPHAGOGASTRODUODENOSCOPY N/A 12/11/2023    Procedure: EGD (ESOPHAGOGASTRODUODENOSCOPY);  Surgeon: rPetty Salgado MD;  Location: [facility];  Service: Endoscopy;  Laterality: N/A;    HYSTERECTOMY  1976    partial    INJECTION OF ANESTHETIC AGENT AROUND MEDIAL BRANCH NERVES INNERVATING LUMBAR FACET JOINT Right 05/10/2023    Procedure: Block-nerve-Lateral-branch-lumbar;  Surgeon: Agustin Robles MD;  Location: [facility];  Service: Pain Management;  Laterality: Right;  L5 and s1,s2 LBB    INJECTION OF ANESTHETIC AGENT AROUND MEDIAL BRANCH NERVES INNERVATING LUMBAR FACET JOINT Right 05/30/2023    Procedure: Block-nerve-medial branch-lumbar;  Surgeon: Agustin Robles MD;  Location: [facility];   Service: Pain Management;  Laterality: Right;  L5, s1 ,s2 LBB #2    INJECTION OF ANESTHETIC AGENT AROUND NERVE Right 10/31/2023    Procedure: INTERCOSTAL NERVE BLOCK;  Surgeon: Washington Shankar MD;  Location: [facility] OR;  Service: Cardiothoracic;  Laterality: Right;    INJECTION, SACROILIAC JOINT Right 01/26/2023    Procedure: INJECTION,SACROILIAC JOINT;  Surgeon: Agustin Robles MD;  Location: [facility] OR;  Service: Pain Management;  Laterality: Right;    INSERTION OF TUNNELED CENTRAL VENOUS CATHETER (CVC) WITH SUBCUTANEOUS PORT Right 10/19/2020    Procedure: INSERTION, PORT-A-CATH;  Surgeon: Misti Mcfarland MD;  Location: [facility] OR;  Service: General;  Laterality: Right;    INTRAMEDULLARY RODDING OF TROCHANTER OF FEMUR Right 11/28/2021    Procedure: INSERTION, INTRAMEDULLARY LUC, FEMUR, TROCHANTER/RIGHT TFN DR FAJARDO NOTIFIED REP;  Surgeon: Kp Fajardo MD;  Location: [facility];  Service: Orthopedics;  Laterality: Right;  SKIP    ROBOT-ASSISTED LAPAROSCOPIC LYMPHADENECTOMY USING DA NELLA XI N/A 09/21/2020    Procedure: XI ROBOTIC LYMPHADENECTOMY-pelvic and kell-aortic;  Surgeon: Altagracia Ray MD;  Location: [facility] OR;  Service: OB/GYN;  Laterality: N/A;    ROBOT-ASSISTED LAPAROSCOPIC OMENTECTOMY USING DA NELLA XI N/A 09/21/2020    Procedure: XI ROBOTIC OMENTECTOMY;  Surgeon: Altagracia Ray MD;  Location: [facility] OR;  Service: OB/GYN;  Laterality: N/A;    ROBOT-ASSISTED LAPAROSCOPIC SALPINGO-OOPHORECTOMY USING DA NELLA XI Bilateral 09/21/2020    Procedure: XI ROBOTIC SALPINGO-OOPHORECTOMY;  Surgeon: Altagracia Ray MD;  Location: [facility] OR;  Service: OB/GYN;  Laterality: Bilateral;    THORACOSCOPIC BIOPSY OF PLEURA Right 10/31/2023    Procedure: VATS, WITH PLEURA BIOPSY;  Surgeon: Washington Shankar MD;  Location: [facility] OR;  Service: Cardiothoracic;  Laterality: Right;  FROZEN SECTION   **KRISTEN-ASSISDT**    THORACOTOMY Right 10/31/2023    Procedure: THORACOTOMY;  Surgeon: Washington Shankar MD;  Location: [facility]  OR;  Service: Cardiothoracic;  Laterality: Right;    UPPER GASTROINTESTINAL ENDOSCOPY       Social History     Tobacco Use    Smoking status: Former     Current packs/day: 0.00     Average packs/day: 1 pack/day for 20.0 years (20.0 ttl pk-yrs)     Types: Cigarettes     Start date:      Quit date:      Years since quittin.2    Smokeless tobacco: Never    Tobacco comments:     quit 40 yrs ago   Substance Use Topics    Alcohol use: Yes     Alcohol/week: 1.0 standard drink of alcohol     Types: 1 Glasses of wine per week     Comment: occasional    Drug use: No        Review of Systems   Negative except as mentioned in HPI         Objective        Vitals:    24 0909   BP: (!) 152/89   Pulse: 82       LIPIDS - LAST 2   Lab Results   Component Value Date    CHOL 200 (H) 2023    CHOL 169 2023    HDL 44 2023    HDL 57 2023    LDLCALC 137.2 2023    LDLCALC 98.2 2023    TRIG 94 2023    TRIG 69 2023    CHOLHDL 22.0 2023    CHOLHDL 33.7 2023       CBC - LAST 2  Lab Results   Component Value Date    WBC 2.54 (L) 2024    WBC 4.89 2024    RBC 3.27 (L) 2024    RBC 3.19 (L) 2024    HGB 8.8 (L) 2024    HGB 8.6 (L) 2024    HCT 29.9 (L) 2024    HCT 28.9 (L) 2024    MCV 91 2024    MCV 91 2024    MCH 26.9 (L) 2024    MCH 27.0 2024    MCHC 29.4 (L) 2024    MCHC 29.8 (L) 2024    RDW 18.6 (H) 2024    RDW 18.0 (H) 2024     2024     2024    MPV 8.6 (L) 2024    MPV 8.8 (L) 2024    GRAN 1.8 2024    GRAN 71.2 2024    LYMPH 0.5 (L) 2024    LYMPH 21.3 2024    MONO 0.1 (L) 2024    MONO 4.3 2024    BASO 0.01 2024    BASO 0.01 2024    NRBC 0 2024    NRBC 0 2024       CHEMISTRY & LIVER FUNCTION - LAST 2  Lab Results   Component Value Date     2024     2024    K  4.3 03/13/2024    K 4.2 03/06/2024     03/13/2024     03/06/2024    CO2 27 03/13/2024    CO2 29 03/06/2024    ANIONGAP 10 03/13/2024    ANIONGAP 8 03/06/2024    BUN 31 (H) 03/13/2024    BUN 24 (H) 03/06/2024    CREATININE 1.0 03/13/2024    CREATININE 1.0 03/06/2024     (H) 03/13/2024     (H) 03/06/2024    CALCIUM 8.9 03/13/2024    CALCIUM 8.6 (L) 03/06/2024    PH 7.335 (L) 10/31/2023    PH 7.412 10/31/2023    MG 1.7 03/13/2024    MG 1.7 03/06/2024    ALBUMIN 3.2 (L) 03/13/2024    ALBUMIN 3.0 (L) 03/06/2024    PROT 6.9 03/13/2024    PROT 6.3 03/06/2024    ALKPHOS 39 (L) 03/13/2024    ALKPHOS 40 (L) 03/06/2024    ALT 12 03/13/2024    ALT 11 03/06/2024    AST 11 03/13/2024    AST 9 (L) 03/06/2024    BILITOT 0.3 03/13/2024    BILITOT 0.2 03/06/2024        CARDIAC PROFILE - LAST 2  Lab Results   Component Value Date     (H) 12/10/2023     (H) 12/03/2023    CPK 35 03/18/2023     12/05/2023    TROPONINI 0.149 (HH) 10/13/2023    TROPONINI 0.033 10/13/2023    TROPONINIHS 9.7 12/10/2023    TROPONINIHS 14.4 12/04/2023        COAGULATION - LAST 2  Lab Results   Component Value Date    LABPT 14.4 10/15/2023    LABPT 14.0 10/14/2023    INR 1.3 (H) 12/03/2023    INR 0.9 10/27/2023    APTT 27.2 10/27/2023    APTT 180.9 (HH) 10/13/2023       ENDOCRINE & PSA - LAST 2  Lab Results   Component Value Date    HGBA1C 5.3 11/28/2023    HGBA1C 5.8 (H) 10/13/2023    TSH 0.772 12/03/2023    TSH 0.637 11/05/2023    PROCAL 0.362 12/10/2023    PROCAL 1.42 (H) 03/18/2023        ECHOCARDIOGRAM RESULTS  Results for orders placed during the hospital encounter of 10/13/23    Echo    Interpretation Summary    Left Ventricle: The left ventricle is normal in size. Normal wall thickness. Normal wall motion. There is normal systolic function with a visually estimated ejection fraction of 55 - 60%. Grade I diastolic dysfunction.    Right Ventricle: Normal right ventricular cavity size. Wall thickness is  normal. Right ventricle wall motion  is normal. Systolic function is normal.    Aortic Valve: There is moderate aortic valve sclerosis.    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 There is mild mitral annular calcification present. There is no stenosis. The mean pressure gradient across the mitral valve is 3 mmHg at a heart rate of  bpm. There is mild regurgitation.    Tricuspid Valve: There is mild regurgitation.  No pulmonary hypertension.    IVC/SVC: Normal venous pressure at 3 mmHg.      CURRENT/PREVIOUS VISIT EKG  Results for orders placed or performed during the hospital encounter of 12/10/23   EKG 12-lead    Collection Time: 12/10/23 10:23 AM    Narrative    Test Reason : R06.02,    Vent. Rate : 095 BPM     Atrial Rate : 095 BPM     P-R Int : 128 ms          QRS Dur : 064 ms      QT Int : 376 ms       P-R-T Axes : 060 041 088 degrees     QTc Int : 472 ms    Sinus rhythm with Premature supraventricular complexes  Anteroseptal infarct ,age undetermined  Abnormal ECG  When compared with ECG of 03-DEC-2023 18:38,  Premature supraventricular complexes are now Present  Anteroseptal infarct is now Present  Confirmed by Luis Eduardo LAWSON, Narciso SAMANIEGO ([id number]) on 12/10/2023 9:37:25 PM    Referred By: [email or web address]   SELF           Confirmed By:Narciso Hoff MD     No valid procedures specified.   No results found for this or any previous visit.    No valid procedures specified.          PREVIOUS STRESS TEST              PREVIOUS ANGIOGRAM        PHYSICAL EXAM    CONSTITUTIONAL: Well built, well nourished in no apparent distress  HEENT: No pallor  NECK: no JVD  LUNGS: CTA b/l  HEART: regular rate and rhythm, S1, S2 normal, no murmur   ABDOMEN: soft  EXTREMITIES: No edema  NEURO: AAO X 3   Psych:  Normal affect    I HAVE REVIEWED :    The vital signs, nurses notes, and all the pertinent radiology and labs.        Current Outpatient Medications   Medication Instructions    albuterol (VENTOLIN HFA) 90  mcg/actuation inhaler 2 puffs, Inhalation, Every 4 hours PRN, Rescue    amiodarone (PACERONE) 200 mg, Oral, 2 times daily    aspirin (ECOTRIN) 81 mg, Oral, Daily    benazepriL (LOTENSIN) 40 MG tablet 1/2 po qd    clopidogreL (PLAVIX) 75 mg, Oral, Daily    folic acid (FOLVITE) 1 mg, Oral, Daily    gabapentin (NEURONTIN) 100 mg, Oral, 2 times daily    HYDROcodone-acetaminophen (NORCO) 5-325 mg per tablet 1 tablet, Oral, Every 6 hours PRN    LORazepam (ATIVAN) 1 mg, Oral, Every 6 hours PRN    magnesium oxide (MAG-OX) 400 mg, Oral, Daily, Patient requested refill    melatonin 5 mg Chew 1 tablet, Oral, Nightly PRN    metoprolol succinate (TOPROL-XL) 25 mg, Oral, Daily    ondansetron (ZOFRAN) 8 mg, Oral, Every 8 hours PRN    potassium chloride (K-TAB) 20 mEq 1 tablet, Oral, Daily    promethazine (PHENERGAN) 25 mg, Oral, Every 4 hours PRN    VIT A/VIT C/VIT E/ZINC/COPPER (ICAPS AREDS ORAL) 1 capsule, Oral, 2 times daily        ECG reviewed by me:  Normal sinus rhythm  Assessment & Plan     History of CAD status post PCI of LAD/circumflex/RCA in September/October 2023- denies angina  VFib after PCI- presumably from hypomagnesemia  Lung cancer status post right lower lobectomy, currently on chemo  Hypertension-BP normal at home  Hyperlipidemia- on simvastatin  Paroxysmal atrial fibrillation-currently not on anticoagulation and in sinus rhythm      Plan:  She is currently not taking Eliquis probably due to thrombocytopenia from chemotherapy.  She has 1 more session of chemotherapy next month.  Advised to continue aspirin, Plavix for now.   will reconsider starting Eliquis after the chemotherapy if platelets are stabilized.  Continue potassium and magnesium supplementation.  Continue benazepril, metoprolol, simvastatin, amiodarone  Home BP monitoring  Advised to stop omeprazole given the interaction with Plavix.  Advised to follow up with the PCP for alternative PPI therapy and further blood work    Follow up in about 3 months  (around 6/20/2024).     Total time of 44 minutes was spent on this encounter including history, physical exam, assessment and plan

## 2024-03-20 NOTE — PROGRESS NOTES
Subjective:       Patient ID: Alexa Otero is a 84 y.o. female.    Chief Complaint: Follow-up    HPI  Review of Systems   Constitutional:  Negative for fatigue and unexpected weight change.   Respiratory:  Negative for chest tightness and shortness of breath.    Cardiovascular:  Negative for chest pain, palpitations and leg swelling.   Gastrointestinal:  Negative for abdominal pain.   Musculoskeletal:  Negative for arthralgias.   Neurological:  Negative for dizziness, syncope, light-headedness and headaches.       Patient Active Problem List   Diagnosis    Sciatica    Syncope and collapse    Polycythemia, secondary    Hyperlipidemia associated with type 2 diabetes mellitus    Controlled type 2 diabetes mellitus with stage 3 chronic kidney disease, without long-term current use of insulin    Hypertension associated with diabetes    Vitamin D deficiency disease    Gastroesophageal reflux disease    History of Kwon's esophagus    Pancreatic pseudocyst/cyst    Hepatic cyst    Low back pain radiating to both legs    Primary osteoarthritis of right ankle    Kwon's esophagus    Chronic low back pain    Dupuytren's contracture of both hands    Right lower quadrant abdominal swelling, mass and lump    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     Closed fracture of femur, intertrochanteric, right, with routine healing, subsequent encounter    Right hip pain    Weakness of right lower extremity    Impaired functional mobility and activity tolerance    Maintenance chemotherapy    Atherosclerosis of aorta    History of colon polyps    Hypomagnesemia    Hypokalemia    Cardiopulmonary arrest    ACP (advance care planning)    Paroxysmal atrial fibrillation    Lung infiltrate    Status post lobectomy of lung    Malignant neoplasm of lower lobe of right lung    Elevated troponin    Pleural effusion    Epigastric discomfort    Iron deficiency anemia due to chronic blood loss    Primary malignant neoplasm of bronchus  of right lower lobe    Oxygen dependent    Thrombocytopenia    Coronary artery disease    Peripheral neuropathy due to chemotherapy    S/P angioplasty with stent    Chronic respiratory failure with hypoxia, on home O2 therapy    Chronic obstructive pulmonary disease, unspecified COPD type    Sacroiliitis, not elsewhere classified     Patient is here for a chronic conditions follow up.    Interim hx:  Pulm Dr. Christian doing chemo currently for bronchogenic ca/mucinous adenoca lung . Has chronic hypoxia requiring oxygen per NC 2 L.  Feels SOb and weak.  Had labs today showing decrease H/H to 7.7/25.3. Onc ordered transfusion. Last PFT: 5/11/23 FEV1 1.72, no obstruction, no restriction, moderate diffusion defect.     HPI  Admitted 10/[facility]23 [facility] for CP arrest/vfib following angiogram with stents placed 10/2023. Had vfib arrest after stent possibly due to low mag.  Found to have RLL infiltrate likely jhon process. VATS with bx attempted unsuccessfully so lobectomy done. Bx confirmed adeno ca  PET scan 8/23 showed hypermetabolic right lobe lesion. Dr. Christian did bronchoscopy with path showing necrotic cells.  Fungal and AFB cultures were negative. The patient reports a 14 month duration of cough with clear to whitish phlegm, mostly occurring while lying down at night.  Had VATS with right lobectomy 10/31/23 complicated by acute anemia secondary to the expected blood loss of surgery and an episode of paroxysmal atrial fibrillation which was converted to sinus rhythm with medical therapy.  The Cardiology Service assisted in managing her atrial fibrillation with the addition of amiodarone.      Admitted 12/6/23 for right pneumonia with pleural effusion . Thoracentesis performed with 650 cc removed, cx neg. Responded to abx and d/c'd.  Readmitted pneumonia 12/15/23.  Cxr revealed progression of the R mid and lower lung consolidation and small pleural effusion. Pulm was consulted and patient bronched on 12/12. Respiratory  culture w/normal cece. 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 GI also consulted for belching and EGD relatively normal s/p esophageal dilation to see if that would help. Belching thought to be possibly 2/2 gastroparesis. Discharged on pepcid       History:  Card Dr. Julio/ Rosio established today CAD, HPL. Afib after VATS. On pacerone , ASA, plavix and toprol. S/p angiogram with stents placed 10/2023. Had vfib arrest after stent possibly due to low mag.  Found to have RLL infiltrate likely jhon process. VATS with bx attempted, unsuccessful so lobectomy done. Bx confirmed adeno ca but developed a fib after surgery.  Was on eliquis but now asa and plavix        GI Dr. Stephenson  colonoscopy 4/23 -2 polyps 1 tubular adenoma  EGD 3/23 -7 gastric polyps and small hiatal hernia, gastritis-path benign . H pylori neg.  Pepcid d/c'd and prilosec added 12/23  GI Dr. Stephenson treating GERD, h/o barretts diagnosed 2015 Dr. Jimenez.  treated with HALO and Stretta by Dr. Samuel.         Pain mgmt Dr. Robles treating SI joint pain (s/p injection 1/23) and lumbar ddd and spondylosis s/p block 5/23.  C/o persistent pain  Right hip-can't sleep at night. Continuing to do PT. Taking gabapentin 100mg bid , mobic 15mg daily. Steps, walking and lying on that side the worst       Had hospitalization 12/2021 after fall . She was admitted with Acute intratrochanteric comminuted fracture of the right femur and had surgery INSERTION, INTRAMEDULLARY LUC, FEMUR, TROCHANTER/RIGHT TFN DR GUTIERRES NOTIFIED REP (Right)  by Dr. Gutierres  She suffered from postoperaive anemia and received one unit pRBC  Later pt was discharged to rehab facility for rehab sessions      DEXA 2/2021 normal      Heme/onc Dr. Cassidy and gyn onc Dr. Ray -      Underwent right SO 9/20 which path revealed right ovarian endometrioid ca.  . completed chemo 2/2021  Right lung cancer s/p lobectomy diagnosed 2023 (see HPI)      Type 2 DM- a1c  5.3 11/23 urine micro neg, lipids at goal. On ACE I and zocor. Dks tage 3      Eye Dr. Diehl -[facility]     Podiatry Dr. Ding DM neuropathy 6/23     mammo 3/2022 neg     Vit d normal     Taking ortho for knee pain- Dr. Mojica started on mobic 3/10/20. ? CHERELLE     GI Dr. Stephenson treating GERD, h/o barretts diagnosed 2015 Dr. Jimenez.  treated with HALO and Stretta by Dr. Samuel.  Last egd 2018 benign.   Objective:      Physical Exam  Vitals and nursing note reviewed.   Constitutional:       Appearance: She is well-developed.   Cardiovascular:      Rate and Rhythm: Normal rate and regular rhythm.      Heart sounds: Normal heart sounds.   Pulmonary:      Effort: Pulmonary effort is normal.      Breath sounds: Normal breath sounds.   Skin:     General: Skin is warm and dry.   Neurological:      Mental Status: She is alert and oriented to person, place, and time.         Assessment:       1. Primary malignant neoplasm of bronchus of right lower lobe    2. Hypertension associated with diabetes    3. Peripheral neuropathy due to chemotherapy    4. Carcinoma of right ovary    5. Coronary artery disease, unspecified vessel or lesion type, unspecified whether angina present, unspecified whether native or transplanted heart    6. S/P angioplasty with stent    7. Hyperlipidemia associated with type 2 diabetes mellitus    8. Gastroesophageal reflux disease, unspecified whether esophagitis present    9. Controlled type 2 diabetes mellitus with stage 3 chronic kidney disease, without long-term current use of insulin    10. Status post lobectomy of lung    11. Chronic respiratory failure with hypoxia, on home O2 therapy    12. Chronic obstructive pulmonary disease, unspecified COPD type    13. Sacroiliitis, not elsewhere classified    14. Atherosclerosis of aorta    15. Pancytopenia        Plan:         1. Primary malignant neoplasm of bronchus of right lower lobe  Cont chemo and onc mgmt as tolerated    2. Hypertension associated  with diabetes  Controlled on current medications.  Continue current medications.  Hold if BP < 100/60  - benazepriL (LOTENSIN) 40 MG tablet; 1/2 po qd  Dispense: 45 tablet; Refill: 3    3. Peripheral neuropathy due to chemotherapy  continue  - gabapentin (NEURONTIN) 100 MG capsule; Take 1 capsule (100 mg total) by mouth 2 (two) times daily.  Dispense: 180 capsule; Refill: 3    4. Carcinoma of right ovary  S/p treatment. Cont onc monitoring    5. Coronary artery disease, unspecified vessel or lesion type, unspecified whether angina present, unspecified whether native or transplanted heart  Cont card care.  Cont plavix and asa.      6. S/P angioplasty with stent  Cont current care    7. Hyperlipidemia associated with type 2 diabetes mellitus  Not on statin currently. monitor    8. Gastroesophageal reflux disease, unspecified whether esophagitis present  Hold prilosec. If sx recur then restart otc nexium or protonix    9. Controlled type 2 diabetes mellitus with stage 3 chronic kidney disease, without long-term current use of insulin  Controlled with diet    10. Status post lobectomy of lung  Cont pulm care and mgmt    11. Chronic respiratory failure with hypoxia, on home O2 therapy  Cont supplemental o2    12. Chronic obstructive pulmonary disease, unspecified COPD type  Cont current mgmt    13. Sacroiliitis, not elsewhere classified  Cont current mgmt    14. Atherosclerosis of aorta  Cont lower risk factors    15. Pancytopenia  Cont heme/onc monitoring. Transfusion scheduled tomorrow        Time spent with patient: 20 minutes    Patient with be reevaluated in 3 months or sooner prn    Greater than 50% of this visit was spent counseling as described in above documentation:Yes

## 2024-03-20 NOTE — TELEPHONE ENCOUNTER
Spoke with patient who reports that she is feeling fatigued and SOB but oxygen does help. Notified Patito Gibson NP-C and per her verbal order, patient to get 2 units of blood with pre meds and lasix between units. Patient made aware of above and verbalized understanding. Reports she will get T&S today.

## 2024-03-21 ENCOUNTER — INFUSION (OUTPATIENT)
Dept: INFUSION THERAPY | Facility: HOSPITAL | Age: 84
End: 2024-03-21
Attending: NURSE PRACTITIONER
Payer: MEDICARE

## 2024-03-21 VITALS
RESPIRATION RATE: 16 BRPM | OXYGEN SATURATION: 100 % | HEART RATE: 79 BPM | TEMPERATURE: 98 F | DIASTOLIC BLOOD PRESSURE: 69 MMHG | SYSTOLIC BLOOD PRESSURE: 153 MMHG

## 2024-03-21 DIAGNOSIS — Z51.11 MAINTENANCE CHEMOTHERAPY: ICD-10-CM

## 2024-03-21 DIAGNOSIS — C56.1 CARCINOMA OF RIGHT OVARY: Primary | ICD-10-CM

## 2024-03-21 DIAGNOSIS — D64.9 SYMPTOMATIC ANEMIA: ICD-10-CM

## 2024-03-21 LAB
BLD PROD TYP BPU: NORMAL
BLD PROD TYP BPU: NORMAL
BLOOD UNIT EXPIRATION DATE: NORMAL
BLOOD UNIT EXPIRATION DATE: NORMAL
BLOOD UNIT TYPE CODE: 6200
BLOOD UNIT TYPE CODE: 6200
BLOOD UNIT TYPE: NORMAL
BLOOD UNIT TYPE: NORMAL
CODING SYSTEM: NORMAL
CODING SYSTEM: NORMAL
CROSSMATCH INTERPRETATION: NORMAL
CROSSMATCH INTERPRETATION: NORMAL
DISPENSE STATUS: NORMAL
DISPENSE STATUS: NORMAL
NUM UNITS TRANS PACKED RBC: NORMAL
NUM UNITS TRANS PACKED RBC: NORMAL

## 2024-03-21 PROCEDURE — 25000003 PHARM REV CODE 250: Performed by: NURSE PRACTITIONER

## 2024-03-21 PROCEDURE — 63600175 PHARM REV CODE 636 W HCPCS: Performed by: OBSTETRICS & GYNECOLOGY

## 2024-03-21 PROCEDURE — P9016 RBC LEUKOCYTES REDUCED: HCPCS | Performed by: NURSE PRACTITIONER

## 2024-03-21 PROCEDURE — 36430 TRANSFUSION BLD/BLD COMPNT: CPT

## 2024-03-21 RX ORDER — DIPHENHYDRAMINE HYDROCHLORIDE 50 MG/ML
25 INJECTION INTRAMUSCULAR; INTRAVENOUS ONCE
Status: ACTIVE | OUTPATIENT
Start: 2024-03-21

## 2024-03-21 RX ORDER — HYDROCODONE BITARTRATE AND ACETAMINOPHEN 500; 5 MG/1; MG/1
TABLET ORAL ONCE
Status: DISCONTINUED | OUTPATIENT
Start: 2024-03-21 | End: 2024-03-21

## 2024-03-21 RX ORDER — FUROSEMIDE 10 MG/ML
20 INJECTION INTRAMUSCULAR; INTRAVENOUS ONCE
Status: DISCONTINUED | OUTPATIENT
Start: 2024-03-21 | End: 2024-03-21

## 2024-03-21 RX ORDER — ACETAMINOPHEN 325 MG/1
650 TABLET ORAL ONCE
Status: DISCONTINUED | OUTPATIENT
Start: 2024-03-21 | End: 2024-03-21

## 2024-03-21 RX ORDER — HEPARIN 100 UNIT/ML
500 SYRINGE INTRAVENOUS
Status: CANCELLED
Start: 2024-03-21

## 2024-03-21 RX ORDER — SODIUM CHLORIDE 0.9 % (FLUSH) 0.9 %
10 SYRINGE (ML) INJECTION
Status: CANCELLED
Start: 2024-03-21

## 2024-03-21 RX ORDER — SODIUM CHLORIDE 0.9 % (FLUSH) 0.9 %
10 SYRINGE (ML) INJECTION
Status: DISCONTINUED | OUTPATIENT
Start: 2024-03-21 | End: 2024-03-21 | Stop reason: HOSPADM

## 2024-03-21 RX ORDER — HEPARIN 100 UNIT/ML
500 SYRINGE INTRAVENOUS
Status: COMPLETED | OUTPATIENT
Start: 2024-03-21 | End: 2024-03-21

## 2024-03-21 RX ADMIN — HEPARIN 500 UNITS: 100 SYRINGE at 11:03

## 2024-03-21 RX ADMIN — SODIUM CHLORIDE: 9 INJECTION, SOLUTION INTRAVENOUS at 07:03

## 2024-03-21 NOTE — PROGRESS NOTES
Pt received 2 unit PRBC through a PAC without difficulties.  Signs and symptoms of transfusion reaction dissussed with patient.  Pt verbalized an understanding.  Pt refused Tylenol and Benadryl. Pt tolerated procedure well and exited the department ambulatory in 's care.

## 2024-03-21 NOTE — PLAN OF CARE
Problem: Adult Inpatient Plan of Care  Goal: Plan of Care Review  Outcome: Ongoing, Progressing  Goal: Optimal Comfort and Wellbeing  Outcome: Ongoing, Progressing  Intervention: Provide Person-Centered Care  Flowsheets (Taken 3/21/2024 8942)  Trust Relationship/Rapport:   care explained   reassurance provided   choices provided   thoughts/feelings acknowledged   emotional support provided   empathic listening provided   questions answered   questions encouraged

## 2024-03-26 ENCOUNTER — OUTPATIENT CASE MANAGEMENT (OUTPATIENT)
Dept: ADMINISTRATIVE | Facility: OTHER | Age: 84
End: 2024-03-26
Payer: MEDICARE

## 2024-03-26 ENCOUNTER — LAB VISIT (OUTPATIENT)
Dept: LAB | Facility: HOSPITAL | Age: 84
End: 2024-03-26
Attending: NURSE PRACTITIONER
Payer: MEDICARE

## 2024-03-26 DIAGNOSIS — C34.31 MALIGNANT NEOPLASM OF LOWER LOBE OF RIGHT LUNG: ICD-10-CM

## 2024-03-26 LAB
ALBUMIN SERPL BCP-MCNC: 3.1 G/DL (ref 3.5–5.2)
ALP SERPL-CCNC: 39 U/L (ref 55–135)
ALT SERPL W/O P-5'-P-CCNC: 10 U/L (ref 10–44)
ANION GAP SERPL CALC-SCNC: 9 MMOL/L (ref 8–16)
AST SERPL-CCNC: 10 U/L (ref 10–40)
BASOPHILS # BLD AUTO: 0.01 K/UL (ref 0–0.2)
BASOPHILS NFR BLD: 0.2 % (ref 0–1.9)
BILIRUB SERPL-MCNC: 0.3 MG/DL (ref 0.1–1)
BUN SERPL-MCNC: 19 MG/DL (ref 8–23)
CALCIUM SERPL-MCNC: 9 MG/DL (ref 8.7–10.5)
CHLORIDE SERPL-SCNC: 100 MMOL/L (ref 95–110)
CO2 SERPL-SCNC: 31 MMOL/L (ref 23–29)
CREAT SERPL-MCNC: 1.1 MG/DL (ref 0.5–1.4)
DIFFERENTIAL METHOD BLD: ABNORMAL
EOSINOPHIL # BLD AUTO: 0 K/UL (ref 0–0.5)
EOSINOPHIL NFR BLD: 0.4 % (ref 0–8)
ERYTHROCYTE [DISTWIDTH] IN BLOOD BY AUTOMATED COUNT: 18.7 % (ref 11.5–14.5)
EST. GFR  (NO RACE VARIABLE): 49.9 ML/MIN/1.73 M^2
GLUCOSE SERPL-MCNC: 180 MG/DL (ref 70–110)
HCT VFR BLD AUTO: 34 % (ref 37–48.5)
HGB BLD-MCNC: 10.6 G/DL (ref 12–16)
IMM GRANULOCYTES # BLD AUTO: 0.02 K/UL (ref 0–0.04)
IMM GRANULOCYTES NFR BLD AUTO: 0.4 % (ref 0–0.5)
LYMPHOCYTES # BLD AUTO: 0.6 K/UL (ref 1–4.8)
LYMPHOCYTES NFR BLD: 11.2 % (ref 18–48)
MAGNESIUM SERPL-MCNC: 1.9 MG/DL (ref 1.6–2.6)
MCH RBC QN AUTO: 29.3 PG (ref 27–31)
MCHC RBC AUTO-ENTMCNC: 31.2 G/DL (ref 32–36)
MCV RBC AUTO: 94 FL (ref 82–98)
MONOCYTES # BLD AUTO: 0.7 K/UL (ref 0.3–1)
MONOCYTES NFR BLD: 14.8 % (ref 4–15)
NEUTROPHILS # BLD AUTO: 3.6 K/UL (ref 1.8–7.7)
NEUTROPHILS NFR BLD: 73 % (ref 38–73)
NRBC BLD-RTO: 0 /100 WBC
PLATELET # BLD AUTO: 191 K/UL (ref 150–450)
PLATELET BLD QL SMEAR: ABNORMAL
PMV BLD AUTO: 9.1 FL (ref 9.2–12.9)
POTASSIUM SERPL-SCNC: 4 MMOL/L (ref 3.5–5.1)
PROT SERPL-MCNC: 6.6 G/DL (ref 6–8.4)
RBC # BLD AUTO: 3.62 M/UL (ref 4–5.4)
SODIUM SERPL-SCNC: 140 MMOL/L (ref 136–145)
WBC # BLD AUTO: 4.99 K/UL (ref 3.9–12.7)

## 2024-03-26 PROCEDURE — 85025 COMPLETE CBC W/AUTO DIFF WBC: CPT | Performed by: NURSE PRACTITIONER

## 2024-03-26 PROCEDURE — 36415 COLL VENOUS BLD VENIPUNCTURE: CPT | Performed by: NURSE PRACTITIONER

## 2024-03-26 PROCEDURE — 83735 ASSAY OF MAGNESIUM: CPT | Performed by: NURSE PRACTITIONER

## 2024-03-26 PROCEDURE — 80053 COMPREHEN METABOLIC PANEL: CPT | Performed by: NURSE PRACTITIONER

## 2024-03-27 ENCOUNTER — OFFICE VISIT (OUTPATIENT)
Dept: HEMATOLOGY/ONCOLOGY | Facility: CLINIC | Age: 84
End: 2024-03-27
Payer: MEDICARE

## 2024-03-27 VITALS
WEIGHT: 118.19 LBS | RESPIRATION RATE: 17 BRPM | TEMPERATURE: 98 F | HEART RATE: 66 BPM | SYSTOLIC BLOOD PRESSURE: 176 MMHG | BODY MASS INDEX: 20.94 KG/M2 | DIASTOLIC BLOOD PRESSURE: 69 MMHG

## 2024-03-27 DIAGNOSIS — R53.83 FATIGUE, UNSPECIFIED TYPE: ICD-10-CM

## 2024-03-27 DIAGNOSIS — E11.22 CONTROLLED TYPE 2 DIABETES MELLITUS WITH STAGE 3 CHRONIC KIDNEY DISEASE, WITHOUT LONG-TERM CURRENT USE OF INSULIN: ICD-10-CM

## 2024-03-27 DIAGNOSIS — Z99.81 OXYGEN DEPENDENT: ICD-10-CM

## 2024-03-27 DIAGNOSIS — T45.1X5A ANEMIA ASSOCIATED WITH CHEMOTHERAPY: ICD-10-CM

## 2024-03-27 DIAGNOSIS — C34.31 MALIGNANT NEOPLASM OF LOWER LOBE OF RIGHT LUNG: Primary | ICD-10-CM

## 2024-03-27 DIAGNOSIS — G62.0 PERIPHERAL NEUROPATHY DUE TO CHEMOTHERAPY: ICD-10-CM

## 2024-03-27 DIAGNOSIS — R11.0 NAUSEA: ICD-10-CM

## 2024-03-27 DIAGNOSIS — N18.30 CONTROLLED TYPE 2 DIABETES MELLITUS WITH STAGE 3 CHRONIC KIDNEY DISEASE, WITHOUT LONG-TERM CURRENT USE OF INSULIN: ICD-10-CM

## 2024-03-27 DIAGNOSIS — D64.81 ANEMIA ASSOCIATED WITH CHEMOTHERAPY: ICD-10-CM

## 2024-03-27 DIAGNOSIS — T45.1X5A PERIPHERAL NEUROPATHY DUE TO CHEMOTHERAPY: ICD-10-CM

## 2024-03-27 PROCEDURE — 1159F MED LIST DOCD IN RCRD: CPT | Mod: CPTII,S$GLB,, | Performed by: NURSE PRACTITIONER

## 2024-03-27 PROCEDURE — 1160F RVW MEDS BY RX/DR IN RCRD: CPT | Mod: CPTII,S$GLB,, | Performed by: NURSE PRACTITIONER

## 2024-03-27 PROCEDURE — 1157F ADVNC CARE PLAN IN RCRD: CPT | Mod: CPTII,S$GLB,, | Performed by: NURSE PRACTITIONER

## 2024-03-27 PROCEDURE — 3077F SYST BP >= 140 MM HG: CPT | Mod: CPTII,S$GLB,, | Performed by: NURSE PRACTITIONER

## 2024-03-27 PROCEDURE — 3078F DIAST BP <80 MM HG: CPT | Mod: CPTII,S$GLB,, | Performed by: NURSE PRACTITIONER

## 2024-03-27 PROCEDURE — 99214 OFFICE O/P EST MOD 30 MIN: CPT | Mod: S$GLB,,, | Performed by: NURSE PRACTITIONER

## 2024-03-27 PROCEDURE — 1126F AMNT PAIN NOTED NONE PRSNT: CPT | Mod: CPTII,S$GLB,, | Performed by: NURSE PRACTITIONER

## 2024-03-27 NOTE — PROGRESS NOTES
PROGRESS NOTE    Subjective:       Patient ID: Alexa Otero is a 84 y.o. female.    8/15/2023 PET:  IMPRESSION:  1. Multifocal right lower lobe and right pleural FDG hypermetabolism as described, with associated right lower lobe consolidation. These are nonspecific and could be of infectious or inflammatory etiology in the clinical setting of chronic pneumonia, and or metastatic disease. Correlation with previous bronchoscopy results is needed.  2. Multiple new non hypermetabolic pulmonary nodules in both lungs, which are generally below size resolution for PET, but suspicious for pulmonary metastases.  3. No additional findings of FDG avid metastatic disease.    10/31/2023 RLL Lobectomy:  LUNG SYNOPTIC REPORT   PROCEDURE:     Lobectomy.   SPECIMEN LATERALITY:    Right   TUMOR SITE:     Lower lobe.   TUMOR SIZE:     Cannot be determined, 4.5 cm area of cavitation but    tumor appears to involve most if not all of the resected lobe.   TUMOR FOCALITY:    Single tumor.   HISTOLOGIC TYPE:    Invasive mucinous adenocarcinoma with pneumonic    pattern.   VISCEROPLEURAL INVASION:   Present   LYMPHOVASCULAR INVASION:   Not identified.   SPREAD OF TUMOR THROUGH AIR SPACES:  Present   DIRECT INVASION OF ADJACENT STRUCTURES: No adjacent structures present.   MARGINS:     All margins are uninvolved by tumor, margins examined:         Bronchial, distance of invasion of tumor from closest         margin: cannot be determined.   TREATMENT EFFECT:    No known presurgical therapy.   ADDITIONAL PATHOLOGIC FINDINGS:  Organized pulmonary emboli, pulmonary    infarct.   REGIONAL LYMPH NODES:    NUMBER OF LYMPH NODES INVOLVED:  Zero, number of lymph nodes examined:     1, yue         structures examined: 10 (hilar)   PATHOLOGIC STAGE CLASSIFICATION    OF PRIMARY TUMOR:    pT3    REGIONAL LYMPH NODES:   pN0     Comment: The sections show invasive mucinous adenocarcinoma with     findings that suggest so-called pneumonic pattern. there is diffuse    involvement with tumor present in all the histologic sections, often    with a lepidic pattern suggesting spread through the air spaces. The    reported CT findings of a consolidated appearance correlates with the    histologic findings and the reported clinical presentation of a    year-long cough productive of purulent mucus. Mucus is also a common    presentation of this somewhat uncommon pulmonary malignancy. These    tumors are two complete stages pT3 when there appears to be involvement    of the entire lobe; this appears to be appropriate in this case. There    are also organizing/organized pulmonary emboli within the vasculature,    possibly indicating hypercoagulability of malignancy as can be seen    especially with mucinous tumors. The tissue sampled in block 2F is    likely an infarct related to this. The history of endometrioid    adenocarcinoma is noted, but the current tumor represents a primary    lung malignancy and is unrelated to the prior cancer.     10/13/2023-Echo:  Normal systolic function with EF 55-60%  Grade I diastolic dysfunction    12/10/2023-CT CAP:  Progression of the alveolar consolidation in the right mid and lower lung with moderate size right pleural effusion. There is progression of the airspace disease within the right upper lobe with patchy groundglass opacity left upper lobe and left lung base. Findings are compatible with multifocal.     12/15/2023-Bronchoscopy:  LUNG, RIGHT MIDDLE LOBE, BIOPSY:   - ATYPICAL ADENOMATOUS HYPERPLASIA.   - IN A BACKGROUND OF REACTIVE FIBROSIS AND FIBRIN.     Comment:   Given the patient's history, this lesion is concerning for well    differentiated lepidic type adenocarcinoma but is quantitatively    insufficient (approximately 1 mm) for a definite diagnosis.     Multiple additional leveled sections were examined.     Carbo/Pemetrexed:  Cycle 1: 1/25/2024  Cycle  2: 2/15/2024  Cycle 3: 3/7/2024  Cycle 4: 4/11/2024- Due    Chief Complaint:  Stage IV Lung cancer follow up    History of Present Illness:   Alexa Otero is a 84 y.o. female who presents for follow up of lung cancer.    Ms. Otero has now had 3 cycles of chemotherapy    She needed blood last week no issues with fevers. She was feeling better yesterday but has nausea and fatigue today. Using Zofran with relief.     She does continue to have neuropathy that is stable bilateral hands and feet.    She continues on portable oxygen at 2 L via NC.    Family and Social history reviewed and is unchanged from 12/1/2023       Current Outpatient Medications:     albuterol (VENTOLIN HFA) 90 mcg/actuation inhaler, Inhale 2 puffs into the lungs every 4 (four) hours as needed for Wheezing or Shortness of Breath. Rescue, Disp: 6.7 g, Rfl: 1    amiodarone (PACERONE) 200 MG Tab, Take 1 tablet (200 mg total) by mouth 2 (two) times daily., Disp: 60 tablet, Rfl: 11    aspirin (ECOTRIN) 81 MG EC tablet, Take 81 mg by mouth once daily., Disp: , Rfl:     benazepriL (LOTENSIN) 40 MG tablet, Take 0.5 tablets (20 mg total) by mouth once daily., Disp: 45 tablet, Rfl: 3    clopidogreL (PLAVIX) 75 mg tablet, Take 1 tablet (75 mg total) by mouth once daily., Disp: 90 tablet, Rfl: 2    folic acid (FOLVITE) 1 MG tablet, Take 1 tablet (1 mg total) by mouth once daily., Disp: 100 tablet, Rfl: 3    gabapentin (NEURONTIN) 100 MG capsule, Take 1 capsule (100 mg total) by mouth 2 (two) times daily., Disp: 180 capsule, Rfl: 3    HYDROcodone-acetaminophen (NORCO) 5-325 mg per tablet, Take 1 tablet by mouth every 6 (six) hours as needed for Pain., Disp: , Rfl:     LORazepam (ATIVAN) 1 MG tablet, Take 1 tablet (1 mg total) by mouth every 6 (six) hours as needed for Anxiety (insomnia)., Disp: 30 tablet, Rfl: 2    magnesium oxide (MAG-OX) 400 mg (241.3 mg magnesium) tablet, Take 1 tablet (400 mg total) by mouth once daily. Patient requested refill, Disp: 90  tablet, Rfl: 3    melatonin 5 mg Chew, Take 1 tablet by mouth nightly as needed (insomnia)., Disp: , Rfl:     metoprolol succinate (TOPROL-XL) 25 MG 24 hr tablet, Take 25 mg by mouth once daily., Disp: , Rfl:     ondansetron (ZOFRAN) 8 MG tablet, Take 1 tablet (8 mg total) by mouth every 8 (eight) hours as needed. (Patient not taking: Reported on 3/20/2024), Disp: 30 tablet, Rfl: 5    potassium chloride (K-TAB) 20 mEq, Take 1 tablet by mouth once daily., Disp: , Rfl:     promethazine (PHENERGAN) 25 MG tablet, Take 1 tablet (25 mg total) by mouth every 4 (four) hours as needed for Nausea. (Patient not taking: Reported on 3/20/2024), Disp: 30 tablet, Rfl: 5    VIT A/VIT C/VIT E/ZINC/COPPER (ICAPS AREDS ORAL), Take 1 capsule by mouth 2 (two) times a day. , Disp: , Rfl:     Current Facility-Administered Medications:     diphenhydrAMINE injection 25 mg, 25 mg, Intravenous, Once, Patito Gibson NP-C    Review of Systems   Constitutional:  Positive for malaise/fatigue. Negative for fever.   HENT:  Negative for hearing loss.    Respiratory:  Positive for cough and shortness of breath.    Cardiovascular:  Negative for chest pain and leg swelling.   Gastrointestinal:  Positive for nausea. Negative for constipation, diarrhea and vomiting.   Genitourinary:  Negative for urgency.   Musculoskeletal:  Positive for myalgias.   Skin:  Negative for rash.   Neurological:  Positive for weakness. Negative for headaches.   Psychiatric/Behavioral:  Negative for depression.        Objective:       Physical Examination:     BP (!) 176/69   Pulse 66   Temp 97.7 °F (36.5 °C)   Resp 17   Wt 53.6 kg (118 lb 3.2 oz)   BMI 20.94 kg/m²     Physical Exam  Constitutional:       Appearance: Normal appearance.   HENT:      Head: Normocephalic and atraumatic.      Right Ear: External ear normal.      Left Ear: External ear normal.      Nose: Rhinorrhea present.      Mouth/Throat:      Mouth: Mucous membranes are moist.   Eyes:      General: No  scleral icterus.     Conjunctiva/sclera: Conjunctivae normal.   Cardiovascular:      Rate and Rhythm: Normal rate.   Pulmonary:      Effort: Pulmonary effort is normal.      Comments: Portable oxygen at 2L via NC  Abdominal:      General: Abdomen is flat.   Neurological:      General: No focal deficit present.      Mental Status: She is alert and oriented to person, place, and time.   Psychiatric:         Mood and Affect: Mood normal.         Behavior: Behavior normal.         Thought Content: Thought content normal.         Judgment: Judgment normal.         Labs:   Recent Results (from the past 336 hour(s))   CBC Auto Differential    Collection Time: 03/26/24 11:14 AM   Result Value Ref Range    WBC 4.99 3.90 - 12.70 K/uL    Hemoglobin 10.6 (L) 12.0 - 16.0 g/dL    Hematocrit 34.0 (L) 37.0 - 48.5 %    Platelets 191 150 - 450 K/uL   CBC Auto Differential    Collection Time: 03/20/24 10:39 AM   Result Value Ref Range    WBC 3.81 (L) 3.90 - 12.70 K/uL    Hemoglobin 7.7 (L) 12.0 - 16.0 g/dL    Hematocrit 25.3 (L) 37.0 - 48.5 %    Platelets 103 (L) 150 - 450 K/uL     CMP  Sodium   Date Value Ref Range Status   03/26/2024 140 136 - 145 mmol/L Final     Potassium   Date Value Ref Range Status   03/26/2024 4.0 3.5 - 5.1 mmol/L Final     Chloride   Date Value Ref Range Status   03/26/2024 100 95 - 110 mmol/L Final     CO2   Date Value Ref Range Status   03/26/2024 31 (H) 23 - 29 mmol/L Final     Glucose   Date Value Ref Range Status   03/26/2024 180 (H) 70 - 110 mg/dL Final     BUN   Date Value Ref Range Status   03/26/2024 19 8 - 23 mg/dL Final     Creatinine   Date Value Ref Range Status   03/26/2024 1.1 0.5 - 1.4 mg/dL Final     Calcium   Date Value Ref Range Status   03/26/2024 9.0 8.7 - 10.5 mg/dL Final     Total Protein   Date Value Ref Range Status   03/26/2024 6.6 6.0 - 8.4 g/dL Final     Albumin   Date Value Ref Range Status   03/26/2024 3.1 (L) 3.5 - 5.2 g/dL Final     Total Bilirubin   Date Value Ref Range Status  "  03/26/2024 0.3 0.1 - 1.0 mg/dL Final     Comment:     For infants and newborns, interpretation of results should be based  on gestational age, weight and in agreement with clinical  observations.    Premature Infant recommended reference ranges:  Up to 24 hours.............<8.0 mg/dL  Up to 48 hours............<12.0 mg/dL  3-5 days..................<15.0 mg/dL  6-29 days.................<15.0 mg/dL       Alkaline Phosphatase   Date Value Ref Range Status   03/26/2024 39 (L) 55 - 135 U/L Final     AST   Date Value Ref Range Status   03/26/2024 10 10 - 40 U/L Final     ALT   Date Value Ref Range Status   03/26/2024 10 10 - 44 U/L Final     Anion Gap   Date Value Ref Range Status   03/26/2024 9 8 - 16 mmol/L Final     eGFR if    Date Value Ref Range Status   04/28/2022 40.6 (A) >60 mL/min/1.73 m^2 Final     eGFR if non    Date Value Ref Range Status   04/28/2022 35.3 (A) >60 mL/min/1.73 m^2 Final     Comment:     Calculation used to obtain the estimated glomerular filtration  rate (eGFR) is the CKD-EPI equation.        Lab Results   Component Value Date    CEA 1.3 07/23/2020     No results found for: "PSA"        Assessment/Plan:     Problem List Items Addressed This Visit          Neuro    Peripheral neuropathy due to chemotherapy       Pulmonary    Oxygen dependent       Oncology    Malignant neoplasm of lower lobe of right lung - Primary    Relevant Orders    NM PET CT FDG Skull Base to Mid Thigh       Endocrine    Controlled type 2 diabetes mellitus with stage 3 chronic kidney disease, without long-term current use of insulin     Other Visit Diagnoses       Nausea        Fatigue, unspecified type        Anemia associated with chemotherapy              Lung Cancer- Continue with Cycle 4 Carbo and Alimta PET scan prior to MD visit 4/17/24  2. Nausea- Continue with Zofran and Phenergan  3. Hypomagnesemia- Continue Mag Oxide BID  4. Anemia- Continue weekly CBC and transfuse as " needed.    Discussion:     Follow up in about 3 weeks (around 4/17/2024) for with Dr. Cassidy.    Electronically signed by Patito Gibson, MSN, APRN, AGNP-C, OCN

## 2024-03-28 ENCOUNTER — OUTPATIENT CASE MANAGEMENT (OUTPATIENT)
Dept: ADMINISTRATIVE | Facility: OTHER | Age: 84
End: 2024-03-28
Payer: MEDICARE

## 2024-03-28 NOTE — LETTER
Alexa Otero  [address] [place] [id number]      Dear Alexa Otero,     I am your nurse with [facility]s Outpatient Care Management Department. I have been unsuccessful at reaching you since we spoke on 03/04/24.  At your earliest convenience, I would like to discuss your healthcare progress.      Please contact me at [phone] from 8:00AM to 4:30 PM on Monday thru Friday.     As you know, [facility] is a program offered to you through [facility] where a nurse is available 24/7 to answer questions or provide medical advice, their number is [phone].    Thanks,      Hui Hayes, RN  Outpatient Care Management

## 2024-03-28 NOTE — PROGRESS NOTES
3/28/2024  2nd attempt to complete Follow-Up  for Outpatient Care Management, left message.  Will send via portal - unable to assess letter.

## 2024-04-05 ENCOUNTER — OUTPATIENT CASE MANAGEMENT (OUTPATIENT)
Dept: ADMINISTRATIVE | Facility: OTHER | Age: 84
End: 2024-04-05
Payer: MEDICARE

## 2024-04-05 NOTE — PROGRESS NOTES
Outpatient Care Management  Plan of Care Follow Up Visit    Patient: Alexa Otero  MRN: [id number]  Date of Service: 04/05/2024  Completed by: Hui Hayes RN  Referral Date: 12/12/2023    Reason for Visit   Patient presents with    [facility] RN Follow Up Call    Case Closure       Brief Summary: Program graduation; case closed.

## 2024-04-10 ENCOUNTER — LAB VISIT (OUTPATIENT)
Dept: LAB | Facility: HOSPITAL | Age: 84
End: 2024-04-10
Attending: NURSE PRACTITIONER
Payer: MEDICARE

## 2024-04-10 DIAGNOSIS — C34.31 MALIGNANT NEOPLASM OF LOWER LOBE OF RIGHT LUNG: ICD-10-CM

## 2024-04-10 LAB
ALBUMIN SERPL BCP-MCNC: 3.2 G/DL (ref 3.5–5.2)
ALP SERPL-CCNC: 50 U/L (ref 55–135)
ALT SERPL W/O P-5'-P-CCNC: 9 U/L (ref 10–44)
ANION GAP SERPL CALC-SCNC: 11 MMOL/L (ref 8–16)
AST SERPL-CCNC: 10 U/L (ref 10–40)
BASOPHILS # BLD AUTO: 0.06 K/UL (ref 0–0.2)
BASOPHILS NFR BLD: 0.5 % (ref 0–1.9)
BILIRUB SERPL-MCNC: 0.3 MG/DL (ref 0.1–1)
BUN SERPL-MCNC: 19 MG/DL (ref 8–23)
CALCIUM SERPL-MCNC: 8.8 MG/DL (ref 8.7–10.5)
CHLORIDE SERPL-SCNC: 101 MMOL/L (ref 95–110)
CO2 SERPL-SCNC: 28 MMOL/L (ref 23–29)
CREAT SERPL-MCNC: 1 MG/DL (ref 0.5–1.4)
DIFFERENTIAL METHOD BLD: ABNORMAL
EOSINOPHIL # BLD AUTO: 0.1 K/UL (ref 0–0.5)
EOSINOPHIL NFR BLD: 0.5 % (ref 0–8)
ERYTHROCYTE [DISTWIDTH] IN BLOOD BY AUTOMATED COUNT: 18.7 % (ref 11.5–14.5)
EST. GFR  (NO RACE VARIABLE): 55.9 ML/MIN/1.73 M^2
GLUCOSE SERPL-MCNC: 107 MG/DL (ref 70–110)
HCT VFR BLD AUTO: 32.4 % (ref 37–48.5)
HGB BLD-MCNC: 9.8 G/DL (ref 12–16)
IMM GRANULOCYTES # BLD AUTO: 0.03 K/UL (ref 0–0.04)
IMM GRANULOCYTES NFR BLD AUTO: 0.3 % (ref 0–0.5)
LYMPHOCYTES # BLD AUTO: 1.2 K/UL (ref 1–4.8)
LYMPHOCYTES NFR BLD: 10.5 % (ref 18–48)
MAGNESIUM SERPL-MCNC: 1.8 MG/DL (ref 1.6–2.6)
MCH RBC QN AUTO: 29.3 PG (ref 27–31)
MCHC RBC AUTO-ENTMCNC: 30.2 G/DL (ref 32–36)
MCV RBC AUTO: 97 FL (ref 82–98)
MONOCYTES # BLD AUTO: 1.5 K/UL (ref 0.3–1)
MONOCYTES NFR BLD: 13.2 % (ref 4–15)
NEUTROPHILS # BLD AUTO: 8.4 K/UL (ref 1.8–7.7)
NEUTROPHILS NFR BLD: 75 % (ref 38–73)
NRBC BLD-RTO: 0 /100 WBC
PLATELET # BLD AUTO: 292 K/UL (ref 150–450)
PMV BLD AUTO: 9 FL (ref 9.2–12.9)
POTASSIUM SERPL-SCNC: 3.6 MMOL/L (ref 3.5–5.1)
PROT SERPL-MCNC: 7.1 G/DL (ref 6–8.4)
RBC # BLD AUTO: 3.34 M/UL (ref 4–5.4)
SODIUM SERPL-SCNC: 140 MMOL/L (ref 136–145)
WBC # BLD AUTO: 11.19 K/UL (ref 3.9–12.7)

## 2024-04-10 PROCEDURE — 85025 COMPLETE CBC W/AUTO DIFF WBC: CPT | Performed by: NURSE PRACTITIONER

## 2024-04-10 PROCEDURE — 36415 COLL VENOUS BLD VENIPUNCTURE: CPT | Performed by: NURSE PRACTITIONER

## 2024-04-10 PROCEDURE — 83735 ASSAY OF MAGNESIUM: CPT | Performed by: NURSE PRACTITIONER

## 2024-04-10 PROCEDURE — 80053 COMPREHEN METABOLIC PANEL: CPT | Performed by: NURSE PRACTITIONER

## 2024-04-10 RX ORDER — CYANOCOBALAMIN 1000 UG/ML
1000 INJECTION, SOLUTION INTRAMUSCULAR; SUBCUTANEOUS
Status: CANCELLED | OUTPATIENT
Start: 2024-04-10

## 2024-04-10 RX ORDER — SODIUM CHLORIDE 0.9 % (FLUSH) 0.9 %
10 SYRINGE (ML) INJECTION
Status: CANCELLED | OUTPATIENT
Start: 2024-04-10

## 2024-04-10 RX ORDER — HEPARIN 100 UNIT/ML
500 SYRINGE INTRAVENOUS
Status: CANCELLED | OUTPATIENT
Start: 2024-04-10

## 2024-04-11 ENCOUNTER — INFUSION (OUTPATIENT)
Dept: INFUSION THERAPY | Facility: HOSPITAL | Age: 84
End: 2024-04-11
Attending: INTERNAL MEDICINE
Payer: MEDICARE

## 2024-04-11 VITALS
WEIGHT: 112.69 LBS | HEART RATE: 77 BPM | RESPIRATION RATE: 16 BRPM | BODY MASS INDEX: 19.97 KG/M2 | OXYGEN SATURATION: 100 % | DIASTOLIC BLOOD PRESSURE: 74 MMHG | SYSTOLIC BLOOD PRESSURE: 150 MMHG | TEMPERATURE: 98 F | HEIGHT: 63 IN

## 2024-04-11 DIAGNOSIS — C34.31 PRIMARY MALIGNANT NEOPLASM OF BRONCHUS OF RIGHT LOWER LOBE: Primary | ICD-10-CM

## 2024-04-11 PROCEDURE — 63600175 PHARM REV CODE 636 W HCPCS: Performed by: NURSE PRACTITIONER

## 2024-04-11 PROCEDURE — 96411 CHEMO IV PUSH ADDL DRUG: CPT

## 2024-04-11 PROCEDURE — 96375 TX/PRO/DX INJ NEW DRUG ADDON: CPT

## 2024-04-11 PROCEDURE — 96372 THER/PROPH/DIAG INJ SC/IM: CPT | Mod: 59

## 2024-04-11 PROCEDURE — 96367 TX/PROPH/DG ADDL SEQ IV INF: CPT

## 2024-04-11 PROCEDURE — 25000003 PHARM REV CODE 250: Performed by: NURSE PRACTITIONER

## 2024-04-11 PROCEDURE — 96413 CHEMO IV INFUSION 1 HR: CPT

## 2024-04-11 RX ORDER — SODIUM CHLORIDE 0.9 % (FLUSH) 0.9 %
10 SYRINGE (ML) INJECTION
Status: DISCONTINUED | OUTPATIENT
Start: 2024-04-11 | End: 2024-04-11 | Stop reason: HOSPADM

## 2024-04-11 RX ORDER — HEPARIN 100 UNIT/ML
500 SYRINGE INTRAVENOUS
Status: DISCONTINUED | OUTPATIENT
Start: 2024-04-11 | End: 2024-04-11 | Stop reason: HOSPADM

## 2024-04-11 RX ORDER — CYANOCOBALAMIN 1000 UG/ML
1000 INJECTION, SOLUTION INTRAMUSCULAR; SUBCUTANEOUS
Status: COMPLETED | OUTPATIENT
Start: 2024-04-11 | End: 2024-04-11

## 2024-04-11 RX ADMIN — DEXAMETHASONE SODIUM PHOSPHATE 0.25 MG: 4 INJECTION, SOLUTION INTRA-ARTICULAR; INTRALESIONAL; INTRAMUSCULAR; INTRAVENOUS; SOFT TISSUE at 08:04

## 2024-04-11 RX ADMIN — HEPARIN 500 UNITS: 100 SYRINGE at 09:04

## 2024-04-11 RX ADMIN — APREPITANT 130 MG: 130 INJECTION, EMULSION INTRAVENOUS at 08:04

## 2024-04-11 RX ADMIN — PEMETREXED DISODIUM 575 MG: 500 INJECTION, POWDER, LYOPHILIZED, FOR SOLUTION INTRAVENOUS at 08:04

## 2024-04-11 RX ADMIN — CARBOPLATIN 300 MG: 10 INJECTION, SOLUTION INTRAVENOUS at 08:04

## 2024-04-11 RX ADMIN — CYANOCOBALAMIN 1000 MCG: 1000 INJECTION, SOLUTION INTRAMUSCULAR at 08:04

## 2024-04-11 RX ADMIN — SODIUM CHLORIDE: 9 INJECTION, SOLUTION INTRAVENOUS at 08:04

## 2024-04-11 NOTE — PLAN OF CARE
Problem: Anemia  Goal: Anemia Symptom Improvement  Outcome: Ongoing, Progressing  Intervention: Monitor and Manage Anemia  Flowsheets (Taken 4/11/2024 0819)  Oral Nutrition Promotion: rest periods promoted  Fatigue Management: activity schedule adjusted

## 2024-04-17 ENCOUNTER — OFFICE VISIT (OUTPATIENT)
Dept: HEMATOLOGY/ONCOLOGY | Facility: CLINIC | Age: 84
End: 2024-04-17
Payer: MEDICARE

## 2024-04-17 ENCOUNTER — LAB VISIT (OUTPATIENT)
Dept: LAB | Facility: HOSPITAL | Age: 84
End: 2024-04-17
Attending: NURSE PRACTITIONER
Payer: MEDICARE

## 2024-04-17 ENCOUNTER — NURSE TRIAGE (OUTPATIENT)
Dept: ADMINISTRATIVE | Facility: CLINIC | Age: 84
End: 2024-04-17
Payer: MEDICARE

## 2024-04-17 VITALS
HEART RATE: 96 BPM | WEIGHT: 112 LBS | BODY MASS INDEX: 19.84 KG/M2 | DIASTOLIC BLOOD PRESSURE: 61 MMHG | RESPIRATION RATE: 16 BRPM | SYSTOLIC BLOOD PRESSURE: 119 MMHG | TEMPERATURE: 98 F

## 2024-04-17 DIAGNOSIS — C34.31 PRIMARY MALIGNANT NEOPLASM OF BRONCHUS OF RIGHT LOWER LOBE: Primary | ICD-10-CM

## 2024-04-17 DIAGNOSIS — J96.11 CHRONIC RESPIRATORY FAILURE WITH HYPOXIA, ON HOME O2 THERAPY: ICD-10-CM

## 2024-04-17 DIAGNOSIS — Z99.81 CHRONIC RESPIRATORY FAILURE WITH HYPOXIA, ON HOME O2 THERAPY: ICD-10-CM

## 2024-04-17 DIAGNOSIS — C34.31 MALIGNANT NEOPLASM OF LOWER LOBE OF RIGHT LUNG: ICD-10-CM

## 2024-04-17 LAB
ALBUMIN SERPL BCP-MCNC: 3 G/DL (ref 3.5–5.2)
ALP SERPL-CCNC: 43 U/L (ref 55–135)
ALT SERPL W/O P-5'-P-CCNC: 8 U/L (ref 10–44)
ANION GAP SERPL CALC-SCNC: 9 MMOL/L (ref 8–16)
AST SERPL-CCNC: 11 U/L (ref 10–40)
BASOPHILS # BLD AUTO: 0.02 K/UL (ref 0–0.2)
BASOPHILS NFR BLD: 0.4 % (ref 0–1.9)
BILIRUB SERPL-MCNC: 0.4 MG/DL (ref 0.1–1)
BUN SERPL-MCNC: 25 MG/DL (ref 8–23)
CALCIUM SERPL-MCNC: 8.7 MG/DL (ref 8.7–10.5)
CHLORIDE SERPL-SCNC: 99 MMOL/L (ref 95–110)
CO2 SERPL-SCNC: 29 MMOL/L (ref 23–29)
CREAT SERPL-MCNC: 1.2 MG/DL (ref 0.5–1.4)
DIFFERENTIAL METHOD BLD: ABNORMAL
EOSINOPHIL # BLD AUTO: 0 K/UL (ref 0–0.5)
EOSINOPHIL NFR BLD: 0.8 % (ref 0–8)
ERYTHROCYTE [DISTWIDTH] IN BLOOD BY AUTOMATED COUNT: 18.3 % (ref 11.5–14.5)
EST. GFR  (NO RACE VARIABLE): 44.9 ML/MIN/1.73 M^2
GLUCOSE SERPL-MCNC: 151 MG/DL (ref 70–110)
HCT VFR BLD AUTO: 30.1 % (ref 37–48.5)
HGB BLD-MCNC: 9.4 G/DL (ref 12–16)
IMM GRANULOCYTES # BLD AUTO: 0.02 K/UL (ref 0–0.04)
IMM GRANULOCYTES NFR BLD AUTO: 0.4 % (ref 0–0.5)
LYMPHOCYTES # BLD AUTO: 0.5 K/UL (ref 1–4.8)
LYMPHOCYTES NFR BLD: 9.1 % (ref 18–48)
MAGNESIUM SERPL-MCNC: 1.7 MG/DL (ref 1.6–2.6)
MCH RBC QN AUTO: 29.7 PG (ref 27–31)
MCHC RBC AUTO-ENTMCNC: 31.2 G/DL (ref 32–36)
MCV RBC AUTO: 95 FL (ref 82–98)
MONOCYTES # BLD AUTO: 0.2 K/UL (ref 0.3–1)
MONOCYTES NFR BLD: 3.8 % (ref 4–15)
NEUTROPHILS # BLD AUTO: 4.5 K/UL (ref 1.8–7.7)
NEUTROPHILS NFR BLD: 85.5 % (ref 38–73)
NRBC BLD-RTO: 0 /100 WBC
PLATELET # BLD AUTO: 205 K/UL (ref 150–450)
PMV BLD AUTO: 8.8 FL (ref 9.2–12.9)
POTASSIUM SERPL-SCNC: 3.9 MMOL/L (ref 3.5–5.1)
PROT SERPL-MCNC: 6.7 G/DL (ref 6–8.4)
RBC # BLD AUTO: 3.16 M/UL (ref 4–5.4)
SODIUM SERPL-SCNC: 137 MMOL/L (ref 136–145)
WBC # BLD AUTO: 5.3 K/UL (ref 3.9–12.7)

## 2024-04-17 PROCEDURE — 1101F PT FALLS ASSESS-DOCD LE1/YR: CPT | Mod: CPTII,S$GLB,, | Performed by: INTERNAL MEDICINE

## 2024-04-17 PROCEDURE — 83735 ASSAY OF MAGNESIUM: CPT | Performed by: NURSE PRACTITIONER

## 2024-04-17 PROCEDURE — 1159F MED LIST DOCD IN RCRD: CPT | Mod: CPTII,S$GLB,, | Performed by: INTERNAL MEDICINE

## 2024-04-17 PROCEDURE — 36415 COLL VENOUS BLD VENIPUNCTURE: CPT | Performed by: NURSE PRACTITIONER

## 2024-04-17 PROCEDURE — 1126F AMNT PAIN NOTED NONE PRSNT: CPT | Mod: CPTII,S$GLB,, | Performed by: INTERNAL MEDICINE

## 2024-04-17 PROCEDURE — 85025 COMPLETE CBC W/AUTO DIFF WBC: CPT | Performed by: NURSE PRACTITIONER

## 2024-04-17 PROCEDURE — 99214 OFFICE O/P EST MOD 30 MIN: CPT | Mod: S$GLB,,, | Performed by: INTERNAL MEDICINE

## 2024-04-17 PROCEDURE — 3288F FALL RISK ASSESSMENT DOCD: CPT | Mod: CPTII,S$GLB,, | Performed by: INTERNAL MEDICINE

## 2024-04-17 PROCEDURE — 3078F DIAST BP <80 MM HG: CPT | Mod: CPTII,S$GLB,, | Performed by: INTERNAL MEDICINE

## 2024-04-17 PROCEDURE — 80053 COMPREHEN METABOLIC PANEL: CPT | Performed by: NURSE PRACTITIONER

## 2024-04-17 PROCEDURE — 1157F ADVNC CARE PLAN IN RCRD: CPT | Mod: CPTII,S$GLB,, | Performed by: INTERNAL MEDICINE

## 2024-04-17 PROCEDURE — 3074F SYST BP LT 130 MM HG: CPT | Mod: CPTII,S$GLB,, | Performed by: INTERNAL MEDICINE

## 2024-04-17 RX ORDER — METOPROLOL SUCCINATE 25 MG/1
25 TABLET, EXTENDED RELEASE ORAL DAILY
Qty: 90 TABLET | Refills: 3 | Status: CANCELLED | OUTPATIENT
Start: 2024-04-17

## 2024-04-17 RX ORDER — METOPROLOL SUCCINATE 25 MG/1
25 TABLET, EXTENDED RELEASE ORAL DAILY
Qty: 90 TABLET | Refills: 3 | Status: SHIPPED | OUTPATIENT
Start: 2024-04-17

## 2024-04-17 NOTE — TELEPHONE ENCOUNTER
Pt requesting refill for her metoprolol, she has been out of the med for a few weeks. She is unsure who prescribed the medication. Care advice discuss with pcp and call back within 1 hour. Pt verbalized understanding.   Reason for Disposition   Prescription refill request for ESSENTIAL medicine (i.e., likelihood of harm to patient if not taken) and triager unable to refill per department policy    Protocols used: Medication Refill and Renewal Call-A-[place]

## 2024-04-17 NOTE — ASSESSMENT & PLAN NOTE
She overall seems stable but a little worse symptomatically which I suspect if from chemotherapy.  Continue current care approach.

## 2024-04-17 NOTE — PROGRESS NOTES
PROGRESS NOTE    Subjective:       Patient ID: Alexa Otero is a 84 y.o. female.    9/21/2020  Bilateral oophorectomy  Right endometrioid Carcinoma  [id number]  S/p Carbo/Taxol x 6    8/15/2023 PET:  IMPRESSION:  1. Multifocal right lower lobe and right pleural FDG hypermetabolism as described, with associated right lower lobe consolidation. These are nonspecific and could be of infectious or inflammatory etiology in the clinical setting of chronic pneumonia, and or metastatic disease. Correlation with previous bronchoscopy results is needed.  2. Multiple new non hypermetabolic pulmonary nodules in both lungs, which are generally below size resolution for PET, but suspicious for pulmonary metastases.  3. No additional findings of FDG avid metastatic disease.    10/31/2023 RLL Lobectomy:  LUNG SYNOPTIC REPORT   PROCEDURE:     Lobectomy.   SPECIMEN LATERALITY:    Right   TUMOR SITE:     Lower lobe.   TUMOR SIZE:     Cannot be determined, 4.5 cm area of cavitation but    tumor appears to involve most if not all of the resected lobe.   TUMOR FOCALITY:    Single tumor.   HISTOLOGIC TYPE:    Invasive mucinous adenocarcinoma with pneumonic    pattern.   VISCEROPLEURAL INVASION:   Present   LYMPHOVASCULAR INVASION:   Not identified.   SPREAD OF TUMOR THROUGH AIR SPACES:  Present   DIRECT INVASION OF ADJACENT STRUCTURES: No adjacent structures present.   MARGINS:     All margins are uninvolved by tumor, margins examined:         Bronchial, distance of invasion of tumor from closest         margin: cannot be determined.   TREATMENT EFFECT:    No known presurgical therapy.   ADDITIONAL PATHOLOGIC FINDINGS:  Organized pulmonary emboli, pulmonary    infarct.   REGIONAL LYMPH NODES:    NUMBER OF LYMPH NODES INVOLVED:  Zero, number of lymph nodes examined:     1, yue         structures examined: 10 (hilar)   PATHOLOGIC STAGE CLASSIFICATION    OF PRIMARY TUMOR:    pT3     REGIONAL LYMPH NODES:   pN0     Comment: The sections show invasive mucinous adenocarcinoma with    findings that suggest so-called pneumonic pattern. there is diffuse    involvement with tumor present in all the histologic sections, often    with a lepidic pattern suggesting spread through the air spaces. The    reported CT findings of a consolidated appearance correlates with the    histologic findings and the reported clinical presentation of a    year-long cough productive of purulent mucus. Mucus is also a common    presentation of this somewhat uncommon pulmonary malignancy. These    tumors are two complete stages pT3 when there appears to be involvement    of the entire lobe; this appears to be appropriate in this case. There    are also organizing/organized pulmonary emboli within the vasculature,    possibly indicating hypercoagulability of malignancy as can be seen    especially with mucinous tumors. The tissue sampled in block 2F is    likely an infarct related to this. The history of endometrioid    adenocarcinoma is noted, but the current tumor represents a primary    lung malignancy and is unrelated to the prior cancer.     10/13/2023-Echo:  Normal systolic function with EF 55-60%  Grade I diastolic dysfunction    12/10/2023-CT CAP:  Progression of the alveolar consolidation in the right mid and lower lung with moderate size right pleural effusion. There is progression of the airspace disease within the right upper lobe with patchy groundglass opacity left upper lobe and left lung base. Findings are compatible with multifocal.     12/15/2023-Bronchoscopy:  LUNG, RIGHT MIDDLE LOBE, BIOPSY:   - ATYPICAL ADENOMATOUS HYPERPLASIA.   - IN A BACKGROUND OF REACTIVE FIBROSIS AND FIBRIN.     Comment:   Given the patient's history, this lesion is concerning for well    differentiated lepidic type adenocarcinoma but is quantitatively    insufficient (approximately 1 mm) for a definite diagnosis.     Multiple  additional leveled sections were examined.     Carbo/Pemetrexed x 4  1/25/2024-4/11/2024      Chief Complaint:  No chief complaint on file.  Stage IV Lung cancer follow up    History of Present Illness:   Alexa Otero is a 84 y.o. female who presents for follow up of lung cancer.      Patient has completed the carbo/pem.  She has been very fatigued but is doing better now.  She feels her breathing is worsening.       Family and Social history reviewed and is unchanged from 12/1/2023             Current Outpatient Medications:     albuterol (VENTOLIN HFA) 90 mcg/actuation inhaler, Inhale 2 puffs into the lungs every 4 (four) hours as needed for Wheezing or Shortness of Breath. Rescue, Disp: 6.7 g, Rfl: 1    amiodarone (PACERONE) 200 MG Tab, Take 1 tablet (200 mg total) by mouth 2 (two) times daily., Disp: 60 tablet, Rfl: 11    aspirin (ECOTRIN) 81 MG EC tablet, Take 81 mg by mouth once daily., Disp: , Rfl:     benazepriL (LOTENSIN) 40 MG tablet, Take 0.5 tablets (20 mg total) by mouth once daily., Disp: 45 tablet, Rfl: 3    clopidogreL (PLAVIX) 75 mg tablet, Take 1 tablet (75 mg total) by mouth once daily., Disp: 90 tablet, Rfl: 2    folic acid (FOLVITE) 1 MG tablet, Take 1 tablet (1 mg total) by mouth once daily., Disp: 100 tablet, Rfl: 3    gabapentin (NEURONTIN) 100 MG capsule, Take 1 capsule (100 mg total) by mouth 2 (two) times daily., Disp: 180 capsule, Rfl: 3    HYDROcodone-acetaminophen (NORCO) 5-325 mg per tablet, Take 1 tablet by mouth every 6 (six) hours as needed for Pain., Disp: , Rfl:     potassium chloride SA (K-DUR,KLOR-CON) 20 MEQ tablet, Take 1 tablet daily, Disp: 90 tablet, Rfl: 1    LORazepam (ATIVAN) 1 MG tablet, Take 1 tablet (1 mg total) by mouth every 6 (six) hours as needed for Anxiety (insomnia)., Disp: 30 tablet, Rfl: 2    magnesium oxide (MAG-OX) 400 mg (241.3 mg magnesium) tablet, Take 1 tablet (400 mg total) by mouth once daily. Patient requested refill, Disp: 90 tablet, Rfl: 3     melatonin 5 mg Chew, Take 1 tablet by mouth nightly as needed (insomnia)., Disp: , Rfl:     metoprolol succinate (TOPROL-XL) 25 MG 24 hr tablet, Take 25 mg by mouth once daily., Disp: , Rfl:     omeprazole (PRILOSEC) 40 MG capsule, Take 1 capsule by mouth daily., Disp: 90 capsule, Rfl: 2    ondansetron (ZOFRAN) 8 MG tablet, Take 1 tablet (8 mg total) by mouth every 8 (eight) hours as needed. (Patient not taking: Reported on 3/20/2024), Disp: 30 tablet, Rfl: 5    potassium chloride (K-TAB) 20 mEq, Take 1 tablet by mouth once daily., Disp: , Rfl:     promethazine (PHENERGAN) 25 MG tablet, Take 1 tablet (25 mg total) by mouth every 4 (four) hours as needed for Nausea. (Patient not taking: Reported on 3/20/2024), Disp: 30 tablet, Rfl: 5    VIT A/VIT C/VIT E/ZINC/COPPER (ICAPS AREDS ORAL), Take 1 capsule by mouth 2 (two) times a day. , Disp: , Rfl:     Current Facility-Administered Medications:     diphenhydrAMINE injection 25 mg, 25 mg, Intravenous, Once, Patito Gibson, NP-C        Objective:       Physical Examination:     /61   Pulse 96   Temp 98 °F (36.7 °C)   Resp 16   Wt 50.8 kg (112 lb)   BMI 19.84 kg/m²     Physical Exam  Constitutional:       Appearance: Normal appearance.   HENT:      Head: Normocephalic and atraumatic.   Eyes:      General: No scleral icterus.     Conjunctiva/sclera: Conjunctivae normal.   Cardiovascular:      Rate and Rhythm: Normal rate.   Pulmonary:      Effort: Pulmonary effort is normal.   Abdominal:      General: Abdomen is flat.   Neurological:      General: No focal deficit present.      Mental Status: She is alert and oriented to person, place, and time.   Psychiatric:         Mood and Affect: Mood normal.         Behavior: Behavior normal.         Thought Content: Thought content normal.         Judgment: Judgment normal.         Labs:   Recent Results (from the past 336 hour(s))   CBC Auto Differential    Collection Time: 04/17/24  9:11 AM   Result Value Ref Range     WBC 5.30 3.90 - 12.70 K/uL    Hemoglobin 9.4 (L) 12.0 - 16.0 g/dL    Hematocrit 30.1 (L) 37.0 - 48.5 %    Platelets 205 150 - 450 K/uL   CBC Auto Differential    Collection Time: 04/10/24  3:23 PM   Result Value Ref Range    WBC 11.19 3.90 - 12.70 K/uL    Hemoglobin 9.8 (L) 12.0 - 16.0 g/dL    Hematocrit 32.4 (L) 37.0 - 48.5 %    Platelets 292 150 - 450 K/uL     CMP  Sodium   Date Value Ref Range Status   04/10/2024 140 136 - 145 mmol/L Final     Potassium   Date Value Ref Range Status   04/10/2024 3.6 3.5 - 5.1 mmol/L Final     Chloride   Date Value Ref Range Status   04/10/2024 101 95 - 110 mmol/L Final     CO2   Date Value Ref Range Status   04/10/2024 28 23 - 29 mmol/L Final     Glucose   Date Value Ref Range Status   04/10/2024 107 70 - 110 mg/dL Final     BUN   Date Value Ref Range Status   04/10/2024 19 8 - 23 mg/dL Final     Creatinine   Date Value Ref Range Status   04/10/2024 1.0 0.5 - 1.4 mg/dL Final     Calcium   Date Value Ref Range Status   04/10/2024 8.8 8.7 - 10.5 mg/dL Final     Total Protein   Date Value Ref Range Status   04/10/2024 7.1 6.0 - 8.4 g/dL Final     Albumin   Date Value Ref Range Status   04/10/2024 3.2 (L) 3.5 - 5.2 g/dL Final     Total Bilirubin   Date Value Ref Range Status   04/10/2024 0.3 0.1 - 1.0 mg/dL Final     Comment:     For infants and newborns, interpretation of results should be based  on gestational age, weight and in agreement with clinical  observations.    Premature Infant recommended reference ranges:  Up to 24 hours.............<8.0 mg/dL  Up to 48 hours............<12.0 mg/dL  3-5 days..................<15.0 mg/dL  6-29 days.................<15.0 mg/dL       Alkaline Phosphatase   Date Value Ref Range Status   04/10/2024 50 (L) 55 - 135 U/L Final     AST   Date Value Ref Range Status   04/10/2024 10 10 - 40 U/L Final     ALT   Date Value Ref Range Status   04/10/2024 9 (L) 10 - 44 U/L Final     Anion Gap   Date Value Ref Range Status   04/10/2024 11 8 - 16 mmol/L  "Final     eGFR if    Date Value Ref Range Status   04/28/2022 40.6 (A) >60 mL/min/1.73 m^2 Final     eGFR if non    Date Value Ref Range Status   04/28/2022 35.3 (A) >60 mL/min/1.73 m^2 Final     Comment:     Calculation used to obtain the estimated glomerular filtration  rate (eGFR) is the CKD-EPI equation.        Lab Results   Component Value Date    CEA 1.3 07/23/2020     No results found for: "PSA"        Assessment/Plan:     Problem List Items Addressed This Visit       Primary malignant neoplasm of bronchus of right lower lobe - Primary     Patient has completed the carbo/alimta and has done reasonably well with this.  She will have PET scan done next week and will see what this shows prior to deciding on path forward.  Could be maintenance therapy with chemo or change in chemo.  Still would consider IO therapy but this would be pushed to later lines given her existing pulmonary disease and risk for pneumonitis with this drug.  Discussed all this today.  Will see her again in four weeks.          Chronic respiratory failure with hypoxia, on home O2 therapy     She overall seems stable but a little worse symptomatically which I suspect if from chemotherapy.  Continue current care approach.             Discussion:     Follow up in about 4 weeks (around 5/15/2024).      Electronically signed by Virgil Lloyd      "

## 2024-04-17 NOTE — ASSESSMENT & PLAN NOTE
Patient has completed the carbo/alimta and has done reasonably well with this.  She will have PET scan done next week and will see what this shows prior to deciding on path forward.  Could be maintenance therapy with chemo or change in chemo.  Still would consider IO therapy but this would be pushed to later lines given her existing pulmonary disease and risk for pneumonitis with this drug.  Discussed all this today.  Will see her again in four weeks.

## 2024-04-23 ENCOUNTER — HOSPITAL ENCOUNTER (OUTPATIENT)
Dept: RADIOLOGY | Facility: HOSPITAL | Age: 84
Discharge: HOME | End: 2024-04-23
Attending: NURSE PRACTITIONER
Payer: MEDICARE

## 2024-04-23 VITALS — BODY MASS INDEX: 19.84 KG/M2 | HEIGHT: 63 IN | WEIGHT: 112 LBS

## 2024-04-23 DIAGNOSIS — C34.31 MALIGNANT NEOPLASM OF LOWER LOBE OF RIGHT LUNG: ICD-10-CM

## 2024-04-23 LAB — GLUCOSE SERPL-MCNC: 110 MG/DL (ref 70–110)

## 2024-04-23 PROCEDURE — 78815 PET IMAGE W/CT SKULL-THIGH: CPT | Mod: 26,PS,, | Performed by: RADIOLOGY

## 2024-04-23 PROCEDURE — A9552 F18 FDG: HCPCS | Mod: PO | Performed by: NURSE PRACTITIONER

## 2024-04-23 PROCEDURE — 78815 PET IMAGE W/CT SKULL-THIGH: CPT | Mod: TC,PS,PO

## 2024-04-23 PROCEDURE — 82962 GLUCOSE BLOOD TEST: CPT | Mod: PO

## 2024-04-23 RX ORDER — FLUDEOXYGLUCOSE F18 500 MCI/ML
12.2 INJECTION INTRAVENOUS
Status: COMPLETED | OUTPATIENT
Start: 2024-04-23 | End: 2024-04-23

## 2024-04-23 RX ADMIN — FLUDEOXYGLUCOSE F-18 12.2 MILLICURIE: 500 INJECTION INTRAVENOUS at 09:04

## 2024-04-24 ENCOUNTER — LAB VISIT (OUTPATIENT)
Dept: LAB | Facility: HOSPITAL | Age: 84
End: 2024-04-24
Attending: NURSE PRACTITIONER
Payer: MEDICARE

## 2024-04-24 DIAGNOSIS — C34.31 MALIGNANT NEOPLASM OF LOWER LOBE OF RIGHT LUNG: ICD-10-CM

## 2024-04-24 LAB
ALBUMIN SERPL BCP-MCNC: 3.2 G/DL (ref 3.5–5.2)
ALP SERPL-CCNC: 46 U/L (ref 55–135)
ALT SERPL W/O P-5'-P-CCNC: 12 U/L (ref 10–44)
ANION GAP SERPL CALC-SCNC: 9 MMOL/L (ref 8–16)
AST SERPL-CCNC: 11 U/L (ref 10–40)
BASOPHILS # BLD AUTO: 0.01 K/UL (ref 0–0.2)
BASOPHILS NFR BLD: 0.2 % (ref 0–1.9)
BILIRUB SERPL-MCNC: 0.4 MG/DL (ref 0.1–1)
BUN SERPL-MCNC: 23 MG/DL (ref 8–23)
CALCIUM SERPL-MCNC: 8.8 MG/DL (ref 8.7–10.5)
CHLORIDE SERPL-SCNC: 103 MMOL/L (ref 95–110)
CO2 SERPL-SCNC: 27 MMOL/L (ref 23–29)
CREAT SERPL-MCNC: 1.1 MG/DL (ref 0.5–1.4)
DIFFERENTIAL METHOD BLD: ABNORMAL
EOSINOPHIL # BLD AUTO: 0 K/UL (ref 0–0.5)
EOSINOPHIL NFR BLD: 0.2 % (ref 0–8)
ERYTHROCYTE [DISTWIDTH] IN BLOOD BY AUTOMATED COUNT: 18 % (ref 11.5–14.5)
EST. GFR  (NO RACE VARIABLE): 49.9 ML/MIN/1.73 M^2
GLUCOSE SERPL-MCNC: 159 MG/DL (ref 70–110)
HCT VFR BLD AUTO: 27.4 % (ref 37–48.5)
HGB BLD-MCNC: 8.5 G/DL (ref 12–16)
IMM GRANULOCYTES # BLD AUTO: 0.02 K/UL (ref 0–0.04)
IMM GRANULOCYTES NFR BLD AUTO: 0.4 % (ref 0–0.5)
LYMPHOCYTES # BLD AUTO: 0.7 K/UL (ref 1–4.8)
LYMPHOCYTES NFR BLD: 14.1 % (ref 18–48)
MAGNESIUM SERPL-MCNC: 1.7 MG/DL (ref 1.6–2.6)
MCH RBC QN AUTO: 30.2 PG (ref 27–31)
MCHC RBC AUTO-ENTMCNC: 31 G/DL (ref 32–36)
MCV RBC AUTO: 98 FL (ref 82–98)
MONOCYTES # BLD AUTO: 0.6 K/UL (ref 0.3–1)
MONOCYTES NFR BLD: 13.9 % (ref 4–15)
NEUTROPHILS # BLD AUTO: 3.3 K/UL (ref 1.8–7.7)
NEUTROPHILS NFR BLD: 71.2 % (ref 38–73)
NRBC BLD-RTO: 0 /100 WBC
PLATELET # BLD AUTO: 82 K/UL (ref 150–450)
PMV BLD AUTO: 9.3 FL (ref 9.2–12.9)
POTASSIUM SERPL-SCNC: 4.2 MMOL/L (ref 3.5–5.1)
PROT SERPL-MCNC: 6.6 G/DL (ref 6–8.4)
RBC # BLD AUTO: 2.81 M/UL (ref 4–5.4)
SODIUM SERPL-SCNC: 139 MMOL/L (ref 136–145)
WBC # BLD AUTO: 4.62 K/UL (ref 3.9–12.7)

## 2024-04-24 PROCEDURE — 83735 ASSAY OF MAGNESIUM: CPT | Performed by: NURSE PRACTITIONER

## 2024-04-24 PROCEDURE — 80053 COMPREHEN METABOLIC PANEL: CPT | Performed by: NURSE PRACTITIONER

## 2024-04-24 PROCEDURE — 36415 COLL VENOUS BLD VENIPUNCTURE: CPT | Performed by: NURSE PRACTITIONER

## 2024-04-24 PROCEDURE — 85025 COMPLETE CBC W/AUTO DIFF WBC: CPT | Performed by: NURSE PRACTITIONER

## 2024-04-30 LAB
OHS QRS DURATION: 68 MS
OHS QTC CALCULATION: 437 MS

## 2024-05-01 ENCOUNTER — LAB VISIT (OUTPATIENT)
Dept: LAB | Facility: HOSPITAL | Age: 84
End: 2024-05-01
Attending: NURSE PRACTITIONER
Payer: MEDICARE

## 2024-05-01 DIAGNOSIS — C34.31 MALIGNANT NEOPLASM OF LOWER LOBE OF RIGHT LUNG: ICD-10-CM

## 2024-05-01 LAB
ALBUMIN SERPL BCP-MCNC: 3.3 G/DL (ref 3.5–5.2)
ALP SERPL-CCNC: 50 U/L (ref 55–135)
ALT SERPL W/O P-5'-P-CCNC: 7 U/L (ref 10–44)
ANION GAP SERPL CALC-SCNC: 8 MMOL/L (ref 8–16)
AST SERPL-CCNC: 13 U/L (ref 10–40)
BASOPHILS # BLD AUTO: 0.02 K/UL (ref 0–0.2)
BASOPHILS NFR BLD: 0.4 % (ref 0–1.9)
BILIRUB SERPL-MCNC: 0.3 MG/DL (ref 0.1–1)
BUN SERPL-MCNC: 22 MG/DL (ref 8–23)
CALCIUM SERPL-MCNC: 8.6 MG/DL (ref 8.7–10.5)
CHLORIDE SERPL-SCNC: 102 MMOL/L (ref 95–110)
CO2 SERPL-SCNC: 28 MMOL/L (ref 23–29)
CREAT SERPL-MCNC: 1.1 MG/DL (ref 0.5–1.4)
DIFFERENTIAL METHOD BLD: ABNORMAL
EOSINOPHIL # BLD AUTO: 0 K/UL (ref 0–0.5)
EOSINOPHIL NFR BLD: 0.4 % (ref 0–8)
ERYTHROCYTE [DISTWIDTH] IN BLOOD BY AUTOMATED COUNT: 19.5 % (ref 11.5–14.5)
EST. GFR  (NO RACE VARIABLE): 49.9 ML/MIN/1.73 M^2
GLUCOSE SERPL-MCNC: 111 MG/DL (ref 70–110)
HCT VFR BLD AUTO: 27 % (ref 37–48.5)
HGB BLD-MCNC: 8.3 G/DL (ref 12–16)
IMM GRANULOCYTES # BLD AUTO: 0.02 K/UL (ref 0–0.04)
IMM GRANULOCYTES NFR BLD AUTO: 0.4 % (ref 0–0.5)
LYMPHOCYTES # BLD AUTO: 0.7 K/UL (ref 1–4.8)
LYMPHOCYTES NFR BLD: 13 % (ref 18–48)
MAGNESIUM SERPL-MCNC: 2 MG/DL (ref 1.6–2.6)
MCH RBC QN AUTO: 30.7 PG (ref 27–31)
MCHC RBC AUTO-ENTMCNC: 30.7 G/DL (ref 32–36)
MCV RBC AUTO: 100 FL (ref 82–98)
MONOCYTES # BLD AUTO: 0.7 K/UL (ref 0.3–1)
MONOCYTES NFR BLD: 13.5 % (ref 4–15)
NEUTROPHILS # BLD AUTO: 3.8 K/UL (ref 1.8–7.7)
NEUTROPHILS NFR BLD: 72.3 % (ref 38–73)
NRBC BLD-RTO: 0 /100 WBC
PLATELET # BLD AUTO: 165 K/UL (ref 150–450)
PMV BLD AUTO: 9.4 FL (ref 9.2–12.9)
POTASSIUM SERPL-SCNC: 4.4 MMOL/L (ref 3.5–5.1)
PROT SERPL-MCNC: 7 G/DL (ref 6–8.4)
RBC # BLD AUTO: 2.7 M/UL (ref 4–5.4)
SODIUM SERPL-SCNC: 138 MMOL/L (ref 136–145)
WBC # BLD AUTO: 5.24 K/UL (ref 3.9–12.7)

## 2024-05-01 PROCEDURE — 36415 COLL VENOUS BLD VENIPUNCTURE: CPT | Performed by: NURSE PRACTITIONER

## 2024-05-01 PROCEDURE — 83735 ASSAY OF MAGNESIUM: CPT | Performed by: NURSE PRACTITIONER

## 2024-05-01 PROCEDURE — 85025 COMPLETE CBC W/AUTO DIFF WBC: CPT | Performed by: NURSE PRACTITIONER

## 2024-05-01 PROCEDURE — 80053 COMPREHEN METABOLIC PANEL: CPT | Performed by: NURSE PRACTITIONER

## 2024-05-07 NOTE — PROGRESS NOTES
PROGRESS NOTE    Subjective:       Patient ID: Alexa Otero is a 84 y.o. female.    9/21/2020  Bilateral oophorectomy  Right endometrioid Carcinoma  [id number]  S/p Carbo/Taxol x 6    8/15/2023 PET:  IMPRESSION:  1. Multifocal right lower lobe and right pleural FDG hypermetabolism as described, with associated right lower lobe consolidation. These are nonspecific and could be of infectious or inflammatory etiology in the clinical setting of chronic pneumonia, and or metastatic disease. Correlation with previous bronchoscopy results is needed.  2. Multiple new non hypermetabolic pulmonary nodules in both lungs, which are generally below size resolution for PET, but suspicious for pulmonary metastases.  3. No additional findings of FDG avid metastatic disease.    10/31/2023 RLL Lobectomy:  LUNG SYNOPTIC REPORT   PROCEDURE:     Lobectomy.   SPECIMEN LATERALITY:    Right   TUMOR SITE:     Lower lobe.   TUMOR SIZE:     Cannot be determined, 4.5 cm area of cavitation but    tumor appears to involve most if not all of the resected lobe.   TUMOR FOCALITY:    Single tumor.   HISTOLOGIC TYPE:    Invasive mucinous adenocarcinoma with pneumonic    pattern.   VISCEROPLEURAL INVASION:   Present   LYMPHOVASCULAR INVASION:   Not identified.   SPREAD OF TUMOR THROUGH AIR SPACES:  Present   DIRECT INVASION OF ADJACENT STRUCTURES: No adjacent structures present.   MARGINS:     All margins are uninvolved by tumor, margins examined:         Bronchial, distance of invasion of tumor from closest         margin: cannot be determined.   TREATMENT EFFECT:    No known presurgical therapy.   ADDITIONAL PATHOLOGIC FINDINGS:  Organized pulmonary emboli, pulmonary    infarct.   REGIONAL LYMPH NODES:    NUMBER OF LYMPH NODES INVOLVED:  Zero, number of lymph nodes examined:     1, yue         structures examined: 10 (hilar)   PATHOLOGIC STAGE CLASSIFICATION    OF PRIMARY TUMOR:    pT3     REGIONAL LYMPH NODES:   pN0     Comment: The sections show invasive mucinous adenocarcinoma with    findings that suggest so-called pneumonic pattern. there is diffuse    involvement with tumor present in all the histologic sections, often    with a lepidic pattern suggesting spread through the air spaces. The    reported CT findings of a consolidated appearance correlates with the    histologic findings and the reported clinical presentation of a    year-long cough productive of purulent mucus. Mucus is also a common    presentation of this somewhat uncommon pulmonary malignancy. These    tumors are two complete stages pT3 when there appears to be involvement    of the entire lobe; this appears to be appropriate in this case. There    are also organizing/organized pulmonary emboli within the vasculature,    possibly indicating hypercoagulability of malignancy as can be seen    especially with mucinous tumors. The tissue sampled in block 2F is    likely an infarct related to this. The history of endometrioid    adenocarcinoma is noted, but the current tumor represents a primary    lung malignancy and is unrelated to the prior cancer.     10/13/2023-Echo:  Normal systolic function with EF 55-60%  Grade I diastolic dysfunction    12/10/2023-CT CAP:  Progression of the alveolar consolidation in the right mid and lower lung with moderate size right pleural effusion. There is progression of the airspace disease within the right upper lobe with patchy groundglass opacity left upper lobe and left lung base. Findings are compatible with multifocal.     12/15/2023-Bronchoscopy:  LUNG, RIGHT MIDDLE LOBE, BIOPSY:   - ATYPICAL ADENOMATOUS HYPERPLASIA.   - IN A BACKGROUND OF REACTIVE FIBROSIS AND FIBRIN.     Comment:   Given the patient's history, this lesion is concerning for well    differentiated lepidic type adenocarcinoma but is quantitatively    insufficient (approximately 1 mm) for a definite diagnosis.     Multiple  additional leveled sections were examined.     Carbo/Pemetrexed x 4  1/25/2024-4/11/2024      Chief Complaint:  No chief complaint on file.  Stage IV Lung cancer follow up    History of Present Illness:   Alexa Otero is a 84 y.o. female who presents for follow up of lung cancer.      Patient has completed the carbo/pem.  She has been very fatigued but is doing better now.  She feels her breathing is stable. Reviewed the PET with the aptient and her  which has shown good response to treatment. Patient continues to have anemia.      Family and Social history reviewed and is unchanged from 12/1/2023             Current Outpatient Medications:     albuterol (VENTOLIN HFA) 90 mcg/actuation inhaler, Inhale 2 puffs into the lungs every 4 (four) hours as needed for Wheezing or Shortness of Breath. Rescue, Disp: 6.7 g, Rfl: 1    amiodarone (PACERONE) 200 MG Tab, Take 1 tablet (200 mg total) by mouth 2 (two) times daily., Disp: 60 tablet, Rfl: 11    aspirin (ECOTRIN) 81 MG EC tablet, Take 81 mg by mouth once daily., Disp: , Rfl:     benazepriL (LOTENSIN) 40 MG tablet, Take 0.5 tablets (20 mg total) by mouth once daily., Disp: 45 tablet, Rfl: 3    clopidogreL (PLAVIX) 75 mg tablet, Take 1 tablet (75 mg total) by mouth once daily., Disp: 90 tablet, Rfl: 2    folic acid (FOLVITE) 1 MG tablet, Take 1 tablet (1 mg total) by mouth once daily., Disp: 100 tablet, Rfl: 3    gabapentin (NEURONTIN) 100 MG capsule, Take 1 capsule (100 mg total) by mouth 2 (two) times daily., Disp: 180 capsule, Rfl: 3    HYDROcodone-acetaminophen (NORCO) 5-325 mg per tablet, Take 1 tablet by mouth every 6 (six) hours as needed for Pain., Disp: , Rfl:     potassium chloride SA (K-DUR,KLOR-CON) 20 MEQ tablet, Take 1 tablet daily, Disp: 90 tablet, Rfl: 1    LORazepam (ATIVAN) 1 MG tablet, Take 1 tablet (1 mg total) by mouth every 6 (six) hours as needed for Anxiety (insomnia)., Disp: 30 tablet, Rfl: 2    magnesium oxide (MAG-OX) 400 mg (241.3 mg  magnesium) tablet, Take 1 tablet (400 mg total) by mouth once daily. Patient requested refill, Disp: 90 tablet, Rfl: 3    melatonin 5 mg Chew, Take 1 tablet by mouth nightly as needed (insomnia)., Disp: , Rfl:     metoprolol succinate (TOPROL-XL) 25 MG 24 hr tablet, Take 1 tablet (25 mg total) by mouth once daily., Disp: 90 tablet, Rfl: 3    omeprazole (PRILOSEC) 40 MG capsule, Take 1 capsule by mouth daily., Disp: 90 capsule, Rfl: 2    ondansetron (ZOFRAN) 8 MG tablet, Take 1 tablet (8 mg total) by mouth every 8 (eight) hours as needed. (Patient not taking: Reported on 3/20/2024), Disp: 30 tablet, Rfl: 5    potassium chloride (K-TAB) 20 mEq, Take 1 tablet by mouth once daily., Disp: , Rfl:     promethazine (PHENERGAN) 25 MG tablet, Take 1 tablet (25 mg total) by mouth every 4 (four) hours as needed for Nausea. (Patient not taking: Reported on 3/20/2024), Disp: 30 tablet, Rfl: 5    VIT A/VIT C/VIT E/ZINC/COPPER (ICAPS AREDS ORAL), Take 1 capsule by mouth 2 (two) times a day. , Disp: , Rfl:     Current Facility-Administered Medications:     diphenhydrAMINE injection 25 mg, 25 mg, Intravenous, Once, Patito Gibson NP-C        Objective:       Physical Examination:     BP (!) 159/67   Pulse 67   Temp 97.5 °F (36.4 °C)   Resp 16   Wt 52.5 kg (115 lb 11.2 oz)   BMI 20.50 kg/m²     Physical Exam  Constitutional:       Appearance: Normal appearance.   HENT:      Head: Normocephalic and atraumatic.      Nose: Nose normal.      Mouth/Throat:      Mouth: Mucous membranes are moist.   Eyes:      General: No scleral icterus.     Conjunctiva/sclera: Conjunctivae normal.   Cardiovascular:      Rate and Rhythm: Normal rate.   Pulmonary:      Comments: Portable oxygen  Abdominal:      General: Abdomen is flat.   Neurological:      General: No focal deficit present.      Mental Status: She is alert and oriented to person, place, and time.   Psychiatric:         Mood and Affect: Mood normal.         Behavior: Behavior normal.          Thought Content: Thought content normal.         Judgment: Judgment normal.         Labs:   Recent Results (from the past 336 hour(s))   CBC Auto Differential    Collection Time: 05/08/24 11:27 AM   Result Value Ref Range    WBC 8.93 3.90 - 12.70 K/uL    Hemoglobin 8.6 (L) 12.0 - 16.0 g/dL    Hematocrit 27.5 (L) 37.0 - 48.5 %    Platelets 319 150 - 450 K/uL   CBC Auto Differential    Collection Time: 05/01/24 11:18 AM   Result Value Ref Range    WBC 5.24 3.90 - 12.70 K/uL    Hemoglobin 8.3 (L) 12.0 - 16.0 g/dL    Hematocrit 27.0 (L) 37.0 - 48.5 %    Platelets 165 150 - 450 K/uL     CMP  Sodium   Date Value Ref Range Status   05/08/2024 140 136 - 145 mmol/L Final     Potassium   Date Value Ref Range Status   05/08/2024 4.1 3.5 - 5.1 mmol/L Final     Chloride   Date Value Ref Range Status   05/08/2024 102 95 - 110 mmol/L Final     CO2   Date Value Ref Range Status   05/08/2024 29 23 - 29 mmol/L Final     Glucose   Date Value Ref Range Status   05/08/2024 124 (H) 70 - 110 mg/dL Final     BUN   Date Value Ref Range Status   05/08/2024 22 8 - 23 mg/dL Final     Creatinine   Date Value Ref Range Status   05/08/2024 1.0 0.5 - 1.4 mg/dL Final     Calcium   Date Value Ref Range Status   05/08/2024 8.9 8.7 - 10.5 mg/dL Final     Total Protein   Date Value Ref Range Status   05/08/2024 6.9 6.0 - 8.4 g/dL Final     Albumin   Date Value Ref Range Status   05/08/2024 3.2 (L) 3.5 - 5.2 g/dL Final     Total Bilirubin   Date Value Ref Range Status   05/08/2024 0.4 0.1 - 1.0 mg/dL Final     Comment:     For infants and newborns, interpretation of results should be based  on gestational age, weight and in agreement with clinical  observations.    Premature Infant recommended reference ranges:  Up to 24 hours.............<8.0 mg/dL  Up to 48 hours............<12.0 mg/dL  3-5 days..................<15.0 mg/dL  6-29 days.................<15.0 mg/dL       Alkaline Phosphatase   Date Value Ref Range Status   05/08/2024 56 55 - 662  U/L Final     AST   Date Value Ref Range Status   05/08/2024 12 10 - 40 U/L Final     ALT   Date Value Ref Range Status   05/08/2024 10 10 - 44 U/L Final     Anion Gap   Date Value Ref Range Status   05/08/2024 9 8 - 16 mmol/L Final     eGFR if    Date Value Ref Range Status   04/28/2022 40.6 (A) >60 mL/min/1.73 m^2 Final     eGFR if non    Date Value Ref Range Status   04/28/2022 35.3 (A) >60 mL/min/1.73 m^2 Final     Comment:     Calculation used to obtain the estimated glomerular filtration  rate (eGFR) is the CKD-EPI equation.        Lab Results   Component Value Date    CEA 1.3 07/23/2020             Assessment/Plan:     Problem List Items Addressed This Visit          Pulmonary    Chronic obstructive pulmonary disease, unspecified COPD type    Pleural effusion, right       Oncology    Primary malignant neoplasm of bronchus of right lower lobe - Primary       Endocrine    Controlled type 2 diabetes mellitus with stage 3 chronic kidney disease, without long-term current use of insulin     Other Visit Diagnoses       Anemia in neoplastic disease                Lung Cancer- Will discuss further treatment vs observation with Dr. Murphy  2. Anemia- repeat labs today  3. COPD- Continue on portable oxygen  4. DM- Continue follow up with PCP  5. Pleural Effusion- continue to monitor    Discussion:     Follow up in about 3 weeks (around 5/29/2024) for with Dr. Murphy.      Electronically signed by Patito Gibson, MSN, APRN, AGNP-C, OCN

## 2024-05-08 ENCOUNTER — OFFICE VISIT (OUTPATIENT)
Dept: HEMATOLOGY/ONCOLOGY | Facility: CLINIC | Age: 84
End: 2024-05-08
Payer: MEDICARE

## 2024-05-08 ENCOUNTER — LAB VISIT (OUTPATIENT)
Dept: LAB | Facility: HOSPITAL | Age: 84
End: 2024-05-08
Attending: NURSE PRACTITIONER
Payer: MEDICARE

## 2024-05-08 VITALS
WEIGHT: 115.69 LBS | RESPIRATION RATE: 16 BRPM | DIASTOLIC BLOOD PRESSURE: 67 MMHG | HEART RATE: 67 BPM | BODY MASS INDEX: 20.5 KG/M2 | SYSTOLIC BLOOD PRESSURE: 159 MMHG | TEMPERATURE: 98 F

## 2024-05-08 DIAGNOSIS — C34.31 PRIMARY MALIGNANT NEOPLASM OF BRONCHUS OF RIGHT LOWER LOBE: Primary | ICD-10-CM

## 2024-05-08 DIAGNOSIS — J90 PLEURAL EFFUSION, RIGHT: ICD-10-CM

## 2024-05-08 DIAGNOSIS — J44.9 CHRONIC OBSTRUCTIVE PULMONARY DISEASE, UNSPECIFIED COPD TYPE: ICD-10-CM

## 2024-05-08 DIAGNOSIS — D63.0 ANEMIA IN NEOPLASTIC DISEASE: ICD-10-CM

## 2024-05-08 DIAGNOSIS — N18.30 CONTROLLED TYPE 2 DIABETES MELLITUS WITH STAGE 3 CHRONIC KIDNEY DISEASE, WITHOUT LONG-TERM CURRENT USE OF INSULIN: ICD-10-CM

## 2024-05-08 DIAGNOSIS — C34.31 MALIGNANT NEOPLASM OF LOWER LOBE OF RIGHT LUNG: ICD-10-CM

## 2024-05-08 DIAGNOSIS — E11.22 CONTROLLED TYPE 2 DIABETES MELLITUS WITH STAGE 3 CHRONIC KIDNEY DISEASE, WITHOUT LONG-TERM CURRENT USE OF INSULIN: ICD-10-CM

## 2024-05-08 LAB
ALBUMIN SERPL BCP-MCNC: 3.2 G/DL (ref 3.5–5.2)
ALP SERPL-CCNC: 56 U/L (ref 55–135)
ALT SERPL W/O P-5'-P-CCNC: 10 U/L (ref 10–44)
ANION GAP SERPL CALC-SCNC: 9 MMOL/L (ref 8–16)
AST SERPL-CCNC: 12 U/L (ref 10–40)
BASOPHILS # BLD AUTO: 0.03 K/UL (ref 0–0.2)
BASOPHILS NFR BLD: 0.3 % (ref 0–1.9)
BILIRUB SERPL-MCNC: 0.4 MG/DL (ref 0.1–1)
BUN SERPL-MCNC: 22 MG/DL (ref 8–23)
CALCIUM SERPL-MCNC: 8.9 MG/DL (ref 8.7–10.5)
CHLORIDE SERPL-SCNC: 102 MMOL/L (ref 95–110)
CO2 SERPL-SCNC: 29 MMOL/L (ref 23–29)
CREAT SERPL-MCNC: 1 MG/DL (ref 0.5–1.4)
DIFFERENTIAL METHOD BLD: ABNORMAL
EOSINOPHIL # BLD AUTO: 0 K/UL (ref 0–0.5)
EOSINOPHIL NFR BLD: 0.3 % (ref 0–8)
ERYTHROCYTE [DISTWIDTH] IN BLOOD BY AUTOMATED COUNT: 19.8 % (ref 11.5–14.5)
EST. GFR  (NO RACE VARIABLE): 55.9 ML/MIN/1.73 M^2
GLUCOSE SERPL-MCNC: 124 MG/DL (ref 70–110)
HCT VFR BLD AUTO: 27.5 % (ref 37–48.5)
HGB BLD-MCNC: 8.6 G/DL (ref 12–16)
IMM GRANULOCYTES # BLD AUTO: 0.06 K/UL (ref 0–0.04)
IMM GRANULOCYTES NFR BLD AUTO: 0.7 % (ref 0–0.5)
LYMPHOCYTES # BLD AUTO: 0.8 K/UL (ref 1–4.8)
LYMPHOCYTES NFR BLD: 8.4 % (ref 18–48)
MAGNESIUM SERPL-MCNC: 1.7 MG/DL (ref 1.6–2.6)
MCH RBC QN AUTO: 31.7 PG (ref 27–31)
MCHC RBC AUTO-ENTMCNC: 31.3 G/DL (ref 32–36)
MCV RBC AUTO: 102 FL (ref 82–98)
MONOCYTES # BLD AUTO: 1 K/UL (ref 0.3–1)
MONOCYTES NFR BLD: 11.2 % (ref 4–15)
NEUTROPHILS # BLD AUTO: 7.1 K/UL (ref 1.8–7.7)
NEUTROPHILS NFR BLD: 79.1 % (ref 38–73)
NRBC BLD-RTO: 0 /100 WBC
PLATELET # BLD AUTO: 319 K/UL (ref 150–450)
PMV BLD AUTO: 9 FL (ref 9.2–12.9)
POTASSIUM SERPL-SCNC: 4.1 MMOL/L (ref 3.5–5.1)
PROT SERPL-MCNC: 6.9 G/DL (ref 6–8.4)
RBC # BLD AUTO: 2.71 M/UL (ref 4–5.4)
SODIUM SERPL-SCNC: 140 MMOL/L (ref 136–145)
WBC # BLD AUTO: 8.93 K/UL (ref 3.9–12.7)

## 2024-05-08 PROCEDURE — 85025 COMPLETE CBC W/AUTO DIFF WBC: CPT | Performed by: NURSE PRACTITIONER

## 2024-05-08 PROCEDURE — 83735 ASSAY OF MAGNESIUM: CPT | Performed by: NURSE PRACTITIONER

## 2024-05-08 PROCEDURE — 80053 COMPREHEN METABOLIC PANEL: CPT | Performed by: NURSE PRACTITIONER

## 2024-05-08 PROCEDURE — 3288F FALL RISK ASSESSMENT DOCD: CPT | Mod: CPTII,S$GLB,, | Performed by: NURSE PRACTITIONER

## 2024-05-08 PROCEDURE — 3078F DIAST BP <80 MM HG: CPT | Mod: CPTII,S$GLB,, | Performed by: NURSE PRACTITIONER

## 2024-05-08 PROCEDURE — 1159F MED LIST DOCD IN RCRD: CPT | Mod: CPTII,S$GLB,, | Performed by: NURSE PRACTITIONER

## 2024-05-08 PROCEDURE — 1126F AMNT PAIN NOTED NONE PRSNT: CPT | Mod: CPTII,S$GLB,, | Performed by: NURSE PRACTITIONER

## 2024-05-08 PROCEDURE — 1160F RVW MEDS BY RX/DR IN RCRD: CPT | Mod: CPTII,S$GLB,, | Performed by: NURSE PRACTITIONER

## 2024-05-08 PROCEDURE — 3077F SYST BP >= 140 MM HG: CPT | Mod: CPTII,S$GLB,, | Performed by: NURSE PRACTITIONER

## 2024-05-08 PROCEDURE — 1101F PT FALLS ASSESS-DOCD LE1/YR: CPT | Mod: CPTII,S$GLB,, | Performed by: NURSE PRACTITIONER

## 2024-05-08 PROCEDURE — 36415 COLL VENOUS BLD VENIPUNCTURE: CPT | Performed by: NURSE PRACTITIONER

## 2024-05-08 PROCEDURE — 1157F ADVNC CARE PLAN IN RCRD: CPT | Mod: CPTII,S$GLB,, | Performed by: NURSE PRACTITIONER

## 2024-05-08 PROCEDURE — 99215 OFFICE O/P EST HI 40 MIN: CPT | Mod: S$GLB,,, | Performed by: NURSE PRACTITIONER

## 2024-05-15 ENCOUNTER — LAB VISIT (OUTPATIENT)
Dept: LAB | Facility: HOSPITAL | Age: 84
End: 2024-05-15
Attending: NURSE PRACTITIONER
Payer: MEDICARE

## 2024-05-15 DIAGNOSIS — C34.31 MALIGNANT NEOPLASM OF LOWER LOBE OF RIGHT LUNG: ICD-10-CM

## 2024-05-15 LAB
ALBUMIN SERPL BCP-MCNC: 3.3 G/DL (ref 3.5–5.2)
ALP SERPL-CCNC: 57 U/L (ref 55–135)
ALT SERPL W/O P-5'-P-CCNC: 8 U/L (ref 10–44)
ANION GAP SERPL CALC-SCNC: 8 MMOL/L (ref 8–16)
AST SERPL-CCNC: 11 U/L (ref 10–40)
BASOPHILS # BLD AUTO: 0.02 K/UL (ref 0–0.2)
BASOPHILS NFR BLD: 0.3 % (ref 0–1.9)
BILIRUB SERPL-MCNC: 0.3 MG/DL (ref 0.1–1)
BUN SERPL-MCNC: 23 MG/DL (ref 8–23)
CALCIUM SERPL-MCNC: 8.6 MG/DL (ref 8.7–10.5)
CHLORIDE SERPL-SCNC: 105 MMOL/L (ref 95–110)
CO2 SERPL-SCNC: 28 MMOL/L (ref 23–29)
CREAT SERPL-MCNC: 1.1 MG/DL (ref 0.5–1.4)
DIFFERENTIAL METHOD BLD: ABNORMAL
EOSINOPHIL # BLD AUTO: 0 K/UL (ref 0–0.5)
EOSINOPHIL NFR BLD: 0.6 % (ref 0–8)
ERYTHROCYTE [DISTWIDTH] IN BLOOD BY AUTOMATED COUNT: 19.3 % (ref 11.5–14.5)
EST. GFR  (NO RACE VARIABLE): 49.9 ML/MIN/1.73 M^2
GLUCOSE SERPL-MCNC: 130 MG/DL (ref 70–110)
HCT VFR BLD AUTO: 27.5 % (ref 37–48.5)
HGB BLD-MCNC: 8.3 G/DL (ref 12–16)
IMM GRANULOCYTES # BLD AUTO: 0.03 K/UL (ref 0–0.04)
IMM GRANULOCYTES NFR BLD AUTO: 0.5 % (ref 0–0.5)
LYMPHOCYTES # BLD AUTO: 0.8 K/UL (ref 1–4.8)
LYMPHOCYTES NFR BLD: 11.7 % (ref 18–48)
MAGNESIUM SERPL-MCNC: 1.9 MG/DL (ref 1.6–2.6)
MCH RBC QN AUTO: 32 PG (ref 27–31)
MCHC RBC AUTO-ENTMCNC: 30.2 G/DL (ref 32–36)
MCV RBC AUTO: 106 FL (ref 82–98)
MONOCYTES # BLD AUTO: 1 K/UL (ref 0.3–1)
MONOCYTES NFR BLD: 15.5 % (ref 4–15)
NEUTROPHILS # BLD AUTO: 4.6 K/UL (ref 1.8–7.7)
NEUTROPHILS NFR BLD: 71.4 % (ref 38–73)
NRBC BLD-RTO: 0 /100 WBC
PLATELET # BLD AUTO: 315 K/UL (ref 150–450)
PMV BLD AUTO: 9.4 FL (ref 9.2–12.9)
POTASSIUM SERPL-SCNC: 4 MMOL/L (ref 3.5–5.1)
PROT SERPL-MCNC: 6.7 G/DL (ref 6–8.4)
RBC # BLD AUTO: 2.59 M/UL (ref 4–5.4)
SODIUM SERPL-SCNC: 141 MMOL/L (ref 136–145)
WBC # BLD AUTO: 6.4 K/UL (ref 3.9–12.7)

## 2024-05-15 PROCEDURE — 83735 ASSAY OF MAGNESIUM: CPT | Performed by: NURSE PRACTITIONER

## 2024-05-15 PROCEDURE — 36415 COLL VENOUS BLD VENIPUNCTURE: CPT | Performed by: NURSE PRACTITIONER

## 2024-05-15 PROCEDURE — 85025 COMPLETE CBC W/AUTO DIFF WBC: CPT | Performed by: NURSE PRACTITIONER

## 2024-05-15 PROCEDURE — 80053 COMPREHEN METABOLIC PANEL: CPT | Performed by: NURSE PRACTITIONER

## 2024-05-23 ENCOUNTER — INFUSION (OUTPATIENT)
Dept: INFUSION THERAPY | Facility: HOSPITAL | Age: 84
End: 2024-05-23
Attending: OBSTETRICS & GYNECOLOGY
Payer: MEDICARE

## 2024-05-23 ENCOUNTER — TELEPHONE (OUTPATIENT)
Facility: CLINIC | Age: 84
End: 2024-05-23
Payer: MEDICARE

## 2024-05-23 VITALS
RESPIRATION RATE: 15 BRPM | TEMPERATURE: 98 F | DIASTOLIC BLOOD PRESSURE: 67 MMHG | HEIGHT: 63 IN | BODY MASS INDEX: 20.49 KG/M2 | OXYGEN SATURATION: 100 % | SYSTOLIC BLOOD PRESSURE: 130 MMHG | HEART RATE: 69 BPM | WEIGHT: 115.63 LBS

## 2024-05-23 DIAGNOSIS — Z51.11 MAINTENANCE CHEMOTHERAPY: ICD-10-CM

## 2024-05-23 DIAGNOSIS — C34.31 MALIGNANT NEOPLASM OF LOWER LOBE OF RIGHT LUNG: ICD-10-CM

## 2024-05-23 DIAGNOSIS — C56.1 CARCINOMA OF RIGHT OVARY: Primary | ICD-10-CM

## 2024-05-23 LAB
ALBUMIN SERPL BCP-MCNC: 3.2 G/DL (ref 3.5–5.2)
ALP SERPL-CCNC: 51 U/L (ref 55–135)
ALT SERPL W/O P-5'-P-CCNC: 7 U/L (ref 10–44)
ANION GAP SERPL CALC-SCNC: 7 MMOL/L (ref 8–16)
AST SERPL-CCNC: 11 U/L (ref 10–40)
BASOPHILS # BLD AUTO: 0.04 K/UL (ref 0–0.2)
BASOPHILS NFR BLD: 0.5 % (ref 0–1.9)
BILIRUB SERPL-MCNC: 0.3 MG/DL (ref 0.1–1)
BUN SERPL-MCNC: 22 MG/DL (ref 8–23)
CALCIUM SERPL-MCNC: 8.6 MG/DL (ref 8.7–10.5)
CHLORIDE SERPL-SCNC: 103 MMOL/L (ref 95–110)
CO2 SERPL-SCNC: 29 MMOL/L (ref 23–29)
CREAT SERPL-MCNC: 1.1 MG/DL (ref 0.5–1.4)
DIFFERENTIAL METHOD BLD: ABNORMAL
EOSINOPHIL # BLD AUTO: 0 K/UL (ref 0–0.5)
EOSINOPHIL NFR BLD: 0.3 % (ref 0–8)
ERYTHROCYTE [DISTWIDTH] IN BLOOD BY AUTOMATED COUNT: 17 % (ref 11.5–14.5)
EST. GFR  (NO RACE VARIABLE): 49.9 ML/MIN/1.73 M^2
GLUCOSE SERPL-MCNC: 123 MG/DL (ref 70–110)
HCT VFR BLD AUTO: 24.9 % (ref 37–48.5)
HGB BLD-MCNC: 7.8 G/DL (ref 12–16)
IMM GRANULOCYTES # BLD AUTO: 0.04 K/UL (ref 0–0.04)
IMM GRANULOCYTES NFR BLD AUTO: 0.5 % (ref 0–0.5)
LYMPHOCYTES # BLD AUTO: 0.6 K/UL (ref 1–4.8)
LYMPHOCYTES NFR BLD: 8 % (ref 18–48)
MAGNESIUM SERPL-MCNC: 1.8 MG/DL (ref 1.6–2.6)
MCH RBC QN AUTO: 32.6 PG (ref 27–31)
MCHC RBC AUTO-ENTMCNC: 31.3 G/DL (ref 32–36)
MCV RBC AUTO: 104 FL (ref 82–98)
MONOCYTES # BLD AUTO: 1 K/UL (ref 0.3–1)
MONOCYTES NFR BLD: 13 % (ref 4–15)
NEUTROPHILS # BLD AUTO: 5.9 K/UL (ref 1.8–7.7)
NEUTROPHILS NFR BLD: 77.7 % (ref 38–73)
NRBC BLD-RTO: 0 /100 WBC
PLATELET # BLD AUTO: 313 K/UL (ref 150–450)
PMV BLD AUTO: 8.7 FL (ref 9.2–12.9)
POTASSIUM SERPL-SCNC: 4.2 MMOL/L (ref 3.5–5.1)
PROT SERPL-MCNC: 6.4 G/DL (ref 6–8.4)
RBC # BLD AUTO: 2.39 M/UL (ref 4–5.4)
SODIUM SERPL-SCNC: 139 MMOL/L (ref 136–145)
WBC # BLD AUTO: 7.61 K/UL (ref 3.9–12.7)

## 2024-05-23 PROCEDURE — 80053 COMPREHEN METABOLIC PANEL: CPT | Performed by: NURSE PRACTITIONER

## 2024-05-23 PROCEDURE — 25000003 PHARM REV CODE 250: Performed by: OBSTETRICS & GYNECOLOGY

## 2024-05-23 PROCEDURE — 83735 ASSAY OF MAGNESIUM: CPT | Performed by: NURSE PRACTITIONER

## 2024-05-23 PROCEDURE — 36591 DRAW BLOOD OFF VENOUS DEVICE: CPT

## 2024-05-23 PROCEDURE — 85025 COMPLETE CBC W/AUTO DIFF WBC: CPT | Performed by: NURSE PRACTITIONER

## 2024-05-23 PROCEDURE — A4216 STERILE WATER/SALINE, 10 ML: HCPCS | Performed by: OBSTETRICS & GYNECOLOGY

## 2024-05-23 PROCEDURE — 63600175 PHARM REV CODE 636 W HCPCS: Performed by: OBSTETRICS & GYNECOLOGY

## 2024-05-23 RX ORDER — HEPARIN 100 UNIT/ML
500 SYRINGE INTRAVENOUS
Status: COMPLETED | OUTPATIENT
Start: 2024-05-23 | End: 2024-05-23

## 2024-05-23 RX ORDER — HEPARIN 100 UNIT/ML
500 SYRINGE INTRAVENOUS
Start: 2024-05-23

## 2024-05-23 RX ORDER — SODIUM CHLORIDE 0.9 % (FLUSH) 0.9 %
10 SYRINGE (ML) INJECTION
Start: 2024-05-23

## 2024-05-23 RX ORDER — SODIUM CHLORIDE 0.9 % (FLUSH) 0.9 %
10 SYRINGE (ML) INJECTION
Status: DISCONTINUED | OUTPATIENT
Start: 2024-05-23 | End: 2024-05-23 | Stop reason: HOSPADM

## 2024-05-23 RX ADMIN — SODIUM CHLORIDE, PRESERVATIVE FREE 10 ML: 5 INJECTION INTRAVENOUS at 11:05

## 2024-05-23 RX ADMIN — HEPARIN 500 UNITS: 100 SYRINGE at 11:05

## 2024-05-23 NOTE — TELEPHONE ENCOUNTER
----- Message from JEREMIAH Hussein sent at 5/23/2024  1:09 PM CDT -----  See how she is feeling. Can set her up with a unit of PRBC's.    Patito      Spoke to patient. She said she feels pretty good and doesn't want a transfusion at this time. She plans to recheck her labs again next week but will call us back before if she starts feeling worse.

## 2024-05-23 NOTE — PLAN OF CARE
Problem: Infection  Goal: Absence of Infection Signs and Symptoms  Outcome: Progressing  Intervention: Prevent or Manage Infection  Flowsheets (Taken 5/23/2024 1140)  Infection Management: aseptic technique maintained  Isolation Precautions:   precautions initiated   precautions maintained

## 2024-05-27 ENCOUNTER — TELEPHONE (OUTPATIENT)
Facility: CLINIC | Age: 84
End: 2024-05-27
Payer: MEDICARE

## 2024-05-27 NOTE — TELEPHONE ENCOUNTER
Patient called in and just wanted to let JEREMIAH Hussein know that patient would be getting her blood rechecked on Wednesday.

## 2024-05-29 ENCOUNTER — LAB VISIT (OUTPATIENT)
Dept: LAB | Facility: HOSPITAL | Age: 84
End: 2024-05-29
Attending: NURSE PRACTITIONER
Payer: MEDICARE

## 2024-05-29 DIAGNOSIS — C34.31 MALIGNANT NEOPLASM OF LOWER LOBE OF RIGHT LUNG: ICD-10-CM

## 2024-05-29 LAB
ALBUMIN SERPL BCP-MCNC: 3.3 G/DL (ref 3.5–5.2)
ALP SERPL-CCNC: 55 U/L (ref 55–135)
ALT SERPL W/O P-5'-P-CCNC: 7 U/L (ref 10–44)
ANION GAP SERPL CALC-SCNC: 13 MMOL/L (ref 8–16)
AST SERPL-CCNC: 12 U/L (ref 10–40)
BASOPHILS # BLD AUTO: 0.04 K/UL (ref 0–0.2)
BASOPHILS NFR BLD: 0.4 % (ref 0–1.9)
BILIRUB SERPL-MCNC: 0.3 MG/DL (ref 0.1–1)
BUN SERPL-MCNC: 22 MG/DL (ref 8–23)
CALCIUM SERPL-MCNC: 8.9 MG/DL (ref 8.7–10.5)
CHLORIDE SERPL-SCNC: 104 MMOL/L (ref 95–110)
CO2 SERPL-SCNC: 24 MMOL/L (ref 23–29)
CREAT SERPL-MCNC: 1.1 MG/DL (ref 0.5–1.4)
DIFFERENTIAL METHOD BLD: ABNORMAL
EOSINOPHIL # BLD AUTO: 0.1 K/UL (ref 0–0.5)
EOSINOPHIL NFR BLD: 0.5 % (ref 0–8)
ERYTHROCYTE [DISTWIDTH] IN BLOOD BY AUTOMATED COUNT: 16.2 % (ref 11.5–14.5)
EST. GFR  (NO RACE VARIABLE): 49.9 ML/MIN/1.73 M^2
GLUCOSE SERPL-MCNC: 165 MG/DL (ref 70–110)
HCT VFR BLD AUTO: 29.6 % (ref 37–48.5)
HGB BLD-MCNC: 9 G/DL (ref 12–16)
IMM GRANULOCYTES # BLD AUTO: 0.05 K/UL (ref 0–0.04)
IMM GRANULOCYTES NFR BLD AUTO: 0.5 % (ref 0–0.5)
LYMPHOCYTES # BLD AUTO: 0.7 K/UL (ref 1–4.8)
LYMPHOCYTES NFR BLD: 6.4 % (ref 18–48)
MAGNESIUM SERPL-MCNC: 1.7 MG/DL (ref 1.6–2.6)
MCH RBC QN AUTO: 32.1 PG (ref 27–31)
MCHC RBC AUTO-ENTMCNC: 30.4 G/DL (ref 32–36)
MCV RBC AUTO: 106 FL (ref 82–98)
MONOCYTES # BLD AUTO: 0.9 K/UL (ref 0.3–1)
MONOCYTES NFR BLD: 8.3 % (ref 4–15)
NEUTROPHILS # BLD AUTO: 8.9 K/UL (ref 1.8–7.7)
NEUTROPHILS NFR BLD: 83.9 % (ref 38–73)
NRBC BLD-RTO: 0 /100 WBC
PLATELET # BLD AUTO: 298 K/UL (ref 150–450)
PMV BLD AUTO: 9.2 FL (ref 9.2–12.9)
POTASSIUM SERPL-SCNC: 4.2 MMOL/L (ref 3.5–5.1)
PROT SERPL-MCNC: 7 G/DL (ref 6–8.4)
RBC # BLD AUTO: 2.8 M/UL (ref 4–5.4)
SODIUM SERPL-SCNC: 141 MMOL/L (ref 136–145)
WBC # BLD AUTO: 10.54 K/UL (ref 3.9–12.7)

## 2024-05-29 PROCEDURE — 80053 COMPREHEN METABOLIC PANEL: CPT | Performed by: NURSE PRACTITIONER

## 2024-05-29 PROCEDURE — 85025 COMPLETE CBC W/AUTO DIFF WBC: CPT | Performed by: NURSE PRACTITIONER

## 2024-05-29 PROCEDURE — 36415 COLL VENOUS BLD VENIPUNCTURE: CPT | Performed by: NURSE PRACTITIONER

## 2024-05-29 PROCEDURE — 83735 ASSAY OF MAGNESIUM: CPT | Performed by: NURSE PRACTITIONER

## 2024-05-31 ENCOUNTER — TELEPHONE (OUTPATIENT)
Facility: CLINIC | Age: 84
End: 2024-05-31
Payer: MEDICARE

## 2024-05-31 NOTE — TELEPHONE ENCOUNTER
----- Message from JEREMIAH Hussein sent at 5/31/2024 12:44 PM CDT -----  Labs are stable hgb better after blood at 9    josephine  ----- Message -----  From: Ghada Gaona RN  Sent: 5/31/2024  10:57 AM CDT  To: JEREMIAH Hussein    What can I tell her?  ----- Message -----  From: Rissa Woody  Sent: 5/31/2024  10:51 AM CDT  To: [facility] Staff    Pt would like a call back with lab results     [phone]

## 2024-06-03 ENCOUNTER — OFFICE VISIT (OUTPATIENT)
Facility: CLINIC | Age: 84
End: 2024-06-03
Payer: MEDICARE

## 2024-06-03 VITALS
DIASTOLIC BLOOD PRESSURE: 71 MMHG | HEART RATE: 69 BPM | BODY MASS INDEX: 20.62 KG/M2 | TEMPERATURE: 98 F | SYSTOLIC BLOOD PRESSURE: 158 MMHG | WEIGHT: 116.38 LBS | RESPIRATION RATE: 18 BRPM

## 2024-06-03 DIAGNOSIS — I15.2 HYPERTENSION ASSOCIATED WITH DIABETES: ICD-10-CM

## 2024-06-03 DIAGNOSIS — E11.59 HYPERTENSION ASSOCIATED WITH DIABETES: ICD-10-CM

## 2024-06-03 DIAGNOSIS — C34.31 MALIGNANT NEOPLASM OF LOWER LOBE OF RIGHT LUNG: Primary | ICD-10-CM

## 2024-06-03 DIAGNOSIS — D50.0 IRON DEFICIENCY ANEMIA DUE TO CHRONIC BLOOD LOSS: ICD-10-CM

## 2024-06-03 PROCEDURE — 99215 OFFICE O/P EST HI 40 MIN: CPT | Mod: HCNC,S$GLB,, | Performed by: INTERNAL MEDICINE

## 2024-06-03 PROCEDURE — 3078F DIAST BP <80 MM HG: CPT | Mod: HCNC,CPTII,S$GLB, | Performed by: INTERNAL MEDICINE

## 2024-06-03 PROCEDURE — 1101F PT FALLS ASSESS-DOCD LE1/YR: CPT | Mod: HCNC,CPTII,S$GLB, | Performed by: INTERNAL MEDICINE

## 2024-06-03 PROCEDURE — 1159F MED LIST DOCD IN RCRD: CPT | Mod: HCNC,CPTII,S$GLB, | Performed by: INTERNAL MEDICINE

## 2024-06-03 PROCEDURE — 1157F ADVNC CARE PLAN IN RCRD: CPT | Mod: HCNC,CPTII,S$GLB, | Performed by: INTERNAL MEDICINE

## 2024-06-03 PROCEDURE — 3077F SYST BP >= 140 MM HG: CPT | Mod: HCNC,CPTII,S$GLB, | Performed by: INTERNAL MEDICINE

## 2024-06-03 PROCEDURE — 1126F AMNT PAIN NOTED NONE PRSNT: CPT | Mod: HCNC,CPTII,S$GLB, | Performed by: INTERNAL MEDICINE

## 2024-06-03 PROCEDURE — 99999 PR PBB SHADOW E&M-EST. PATIENT-LVL III: CPT | Mod: PBBFAC,HCNC,, | Performed by: INTERNAL MEDICINE

## 2024-06-03 PROCEDURE — 3288F FALL RISK ASSESSMENT DOCD: CPT | Mod: HCNC,CPTII,S$GLB, | Performed by: INTERNAL MEDICINE

## 2024-06-03 RX ORDER — SODIUM CHLORIDE 0.9 % (FLUSH) 0.9 %
10 SYRINGE (ML) INJECTION
OUTPATIENT
Start: 2024-06-06

## 2024-06-03 RX ORDER — CYANOCOBALAMIN 1000 UG/ML
1000 INJECTION, SOLUTION INTRAMUSCULAR; SUBCUTANEOUS
OUTPATIENT
Start: 2024-06-06

## 2024-06-03 RX ORDER — HEPARIN 100 UNIT/ML
500 SYRINGE INTRAVENOUS
OUTPATIENT
Start: 2024-06-06

## 2024-06-03 NOTE — ASSESSMENT & PLAN NOTE
BP is up a bit and this is likely an element of white coat syndrome.  I discussed this today and will have patient check this at home.  Will call if this remains high.

## 2024-06-03 NOTE — ASSESSMENT & PLAN NOTE
I viewed the PET images and reviewed the report.  She appears to have had a positive response to the chemotherapy.  I discussed the use of maintenance therapy with single agent Alimta today and she feels comfortable moving forward with this.  Will plan on imaging every three months and will continue to see her every 3 weeks for chemotherapy monitoring.  Will resume therapy this week if scheduling allows.

## 2024-06-03 NOTE — PROGRESS NOTES
PROGRESS NOTE    Subjective:       Patient ID: Alexa Otero is a 84 y.o. female.    9/21/2020  Bilateral oophorectomy  Right endometrioid Carcinoma  [id number]  S/p Carbo/Taxol x 6    8/15/2023 PET:  IMPRESSION:  1. Multifocal right lower lobe and right pleural FDG hypermetabolism as described, with associated right lower lobe consolidation. These are nonspecific and could be of infectious or inflammatory etiology in the clinical setting of chronic pneumonia, and or metastatic disease. Correlation with previous bronchoscopy results is needed.  2. Multiple new non hypermetabolic pulmonary nodules in both lungs, which are generally below size resolution for PET, but suspicious for pulmonary metastases.  3. No additional findings of FDG avid metastatic disease.    10/31/2023 RLL Lobectomy:  LUNG SYNOPTIC REPORT   PROCEDURE:     Lobectomy.   SPECIMEN LATERALITY:    Right   TUMOR SITE:     Lower lobe.   TUMOR SIZE:     Cannot be determined, 4.5 cm area of cavitation but    tumor appears to involve most if not all of the resected lobe.   TUMOR FOCALITY:    Single tumor.   HISTOLOGIC TYPE:    Invasive mucinous adenocarcinoma with pneumonic    pattern.   VISCEROPLEURAL INVASION:   Present   LYMPHOVASCULAR INVASION:   Not identified.   SPREAD OF TUMOR THROUGH AIR SPACES:  Present   DIRECT INVASION OF ADJACENT STRUCTURES: No adjacent structures present.   MARGINS:     All margins are uninvolved by tumor, margins examined:         Bronchial, distance of invasion of tumor from closest         margin: cannot be determined.   TREATMENT EFFECT:    No known presurgical therapy.   ADDITIONAL PATHOLOGIC FINDINGS:  Organized pulmonary emboli, pulmonary    infarct.   REGIONAL LYMPH NODES:    NUMBER OF LYMPH NODES INVOLVED:  Zero, number of lymph nodes examined:     1, yue         structures examined: 10 (hilar)   PATHOLOGIC STAGE CLASSIFICATION    OF PRIMARY TUMOR:    pT3     REGIONAL LYMPH NODES:   pN0     Comment: The sections show invasive mucinous adenocarcinoma with    findings that suggest so-called pneumonic pattern. there is diffuse    involvement with tumor present in all the histologic sections, often    with a lepidic pattern suggesting spread through the air spaces. The    reported CT findings of a consolidated appearance correlates with the    histologic findings and the reported clinical presentation of a    year-long cough productive of purulent mucus. Mucus is also a common    presentation of this somewhat uncommon pulmonary malignancy. These    tumors are two complete stages pT3 when there appears to be involvement    of the entire lobe; this appears to be appropriate in this case. There    are also organizing/organized pulmonary emboli within the vasculature,    possibly indicating hypercoagulability of malignancy as can be seen    especially with mucinous tumors. The tissue sampled in block 2F is    likely an infarct related to this. The history of endometrioid    adenocarcinoma is noted, but the current tumor represents a primary    lung malignancy and is unrelated to the prior cancer.     10/13/2023-Echo:  Normal systolic function with EF 55-60%  Grade I diastolic dysfunction    12/10/2023-CT CAP:  Progression of the alveolar consolidation in the right mid and lower lung with moderate size right pleural effusion. There is progression of the airspace disease within the right upper lobe with patchy groundglass opacity left upper lobe and left lung base. Findings are compatible with multifocal.     12/15/2023-Bronchoscopy:  LUNG, RIGHT MIDDLE LOBE, BIOPSY:   - ATYPICAL ADENOMATOUS HYPERPLASIA.   - IN A BACKGROUND OF REACTIVE FIBROSIS AND FIBRIN.     Comment:   Given the patient's history, this lesion is concerning for well    differentiated lepidic type adenocarcinoma but is quantitatively    insufficient (approximately 1 mm) for a definite diagnosis.     Multiple  additional leveled sections were examined.     Carbo/Pemetrexed x 4  1/25/2024-4/11/2024 4/23/2024-PET:  1. Interval improvement in hypermetabolic right lung airspace opacities, presumably reflecting infectious or inflammatory etiology.  2. No convincing evidence of recurrent neoplasm or metastatic disease.  3. Interval increased size of right pleural effusion.  4. Additional stable observations as described.      Chief Complaint:  No chief complaint on file.  Stage IV Lung cancer follow up    History of Present Illness:   Alexa Otero is a 84 y.o. female who presents for follow up of lung cancer.      Patient has completed the carbo/pem.    Her scans have improved after chemotherapy.     Family and Social history reviewed and is unchanged from 12/1/2023             Current Outpatient Medications:     albuterol (VENTOLIN HFA) 90 mcg/actuation inhaler, Inhale 2 puffs into the lungs every 4 (four) hours as needed for Wheezing or Shortness of Breath. Rescue, Disp: 6.7 g, Rfl: 1    amiodarone (PACERONE) 200 MG Tab, Take 1 tablet (200 mg total) by mouth 2 (two) times daily., Disp: 60 tablet, Rfl: 11    aspirin (ECOTRIN) 81 MG EC tablet, Take 81 mg by mouth once daily., Disp: , Rfl:     benazepriL (LOTENSIN) 40 MG tablet, Take 0.5 tablets (20 mg total) by mouth once daily., Disp: 45 tablet, Rfl: 3    clopidogreL (PLAVIX) 75 mg tablet, Take 1 tablet (75 mg total) by mouth once daily., Disp: 90 tablet, Rfl: 2    folic acid (FOLVITE) 1 MG tablet, Take 1 tablet (1 mg total) by mouth once daily., Disp: 100 tablet, Rfl: 3    gabapentin (NEURONTIN) 100 MG capsule, Take 1 capsule (100 mg total) by mouth 2 (two) times daily., Disp: 180 capsule, Rfl: 3    HYDROcodone-acetaminophen (NORCO) 5-325 mg per tablet, Take 1 tablet by mouth every 6 (six) hours as needed for Pain., Disp: , Rfl:     potassium chloride SA (K-DUR,KLOR-CON) 20 MEQ tablet, Take 1 tablet daily, Disp: 90 tablet, Rfl: 1    LORazepam (ATIVAN) 1 MG tablet, Take 1  tablet (1 mg total) by mouth every 6 (six) hours as needed for Anxiety (insomnia)., Disp: 30 tablet, Rfl: 2    magnesium oxide (MAG-OX) 400 mg (241.3 mg magnesium) tablet, Take 1 tablet (400 mg total) by mouth once daily. Patient requested refill, Disp: 90 tablet, Rfl: 3    melatonin 5 mg Chew, Take 1 tablet by mouth nightly as needed (insomnia)., Disp: , Rfl:     metoprolol succinate (TOPROL-XL) 25 MG 24 hr tablet, Take 1 tablet (25 mg total) by mouth once daily., Disp: 90 tablet, Rfl: 3    omeprazole (PRILOSEC) 40 MG capsule, Take 1 capsule by mouth daily., Disp: 90 capsule, Rfl: 2    ondansetron (ZOFRAN) 8 MG tablet, Take 1 tablet (8 mg total) by mouth every 8 (eight) hours as needed. (Patient not taking: Reported on 3/20/2024), Disp: 30 tablet, Rfl: 5    potassium chloride (K-TAB) 20 mEq, Take 1 tablet by mouth once daily., Disp: , Rfl:     promethazine (PHENERGAN) 25 MG tablet, Take 1 tablet (25 mg total) by mouth every 4 (four) hours as needed for Nausea. (Patient not taking: Reported on 3/20/2024), Disp: 30 tablet, Rfl: 5    VIT A/VIT C/VIT E/ZINC/COPPER (ICAPS AREDS ORAL), Take 1 capsule by mouth 2 (two) times a day. , Disp: , Rfl:     Current Facility-Administered Medications:     diphenhydrAMINE injection 25 mg, 25 mg, Intravenous, Once, Patito Gibson, ZION-C        Objective:       Physical Examination:     BP (!) 158/71   Pulse 69   Temp 98.3 °F (36.8 °C)   Resp 18   Wt 52.8 kg (116 lb 6.4 oz)   BMI 20.62 kg/m²     Physical Exam  Constitutional:       Appearance: Normal appearance.   HENT:      Head: Normocephalic and atraumatic.   Eyes:      General: No scleral icterus.     Conjunctiva/sclera: Conjunctivae normal.   Cardiovascular:      Rate and Rhythm: Normal rate.   Pulmonary:      Effort: Pulmonary effort is normal.   Abdominal:      General: Abdomen is flat.   Neurological:      General: No focal deficit present.      Mental Status: She is alert and oriented to person, place, and time.    Psychiatric:         Mood and Affect: Mood normal.         Behavior: Behavior normal.         Thought Content: Thought content normal.         Judgment: Judgment normal.         Labs:   Recent Results (from the past 336 hour(s))   CBC Auto Differential    Collection Time: 05/29/24 11:05 AM   Result Value Ref Range    WBC 10.54 3.90 - 12.70 K/uL    Hemoglobin 9.0 (L) 12.0 - 16.0 g/dL    Hematocrit 29.6 (L) 37.0 - 48.5 %    Platelets 298 150 - 450 K/uL   CBC Auto Differential    Collection Time: 05/23/24 11:48 AM   Result Value Ref Range    WBC 7.61 3.90 - 12.70 K/uL    Hemoglobin 7.8 (L) 12.0 - 16.0 g/dL    Hematocrit 24.9 (L) 37.0 - 48.5 %    Platelets 313 150 - 450 K/uL     CMP  Sodium   Date Value Ref Range Status   05/29/2024 141 136 - 145 mmol/L Final     Potassium   Date Value Ref Range Status   05/29/2024 4.2 3.5 - 5.1 mmol/L Final     Chloride   Date Value Ref Range Status   05/29/2024 104 95 - 110 mmol/L Final     CO2   Date Value Ref Range Status   05/29/2024 24 23 - 29 mmol/L Final     Glucose   Date Value Ref Range Status   05/29/2024 165 (H) 70 - 110 mg/dL Final     BUN   Date Value Ref Range Status   05/29/2024 22 8 - 23 mg/dL Final     Creatinine   Date Value Ref Range Status   05/29/2024 1.1 0.5 - 1.4 mg/dL Final     Calcium   Date Value Ref Range Status   05/29/2024 8.9 8.7 - 10.5 mg/dL Final     Total Protein   Date Value Ref Range Status   05/29/2024 7.0 6.0 - 8.4 g/dL Final     Albumin   Date Value Ref Range Status   05/29/2024 3.3 (L) 3.5 - 5.2 g/dL Final     Total Bilirubin   Date Value Ref Range Status   05/29/2024 0.3 0.1 - 1.0 mg/dL Final     Comment:     For infants and newborns, interpretation of results should be based  on gestational age, weight and in agreement with clinical  observations.    Premature Infant recommended reference ranges:  Up to 24 hours.............<8.0 mg/dL  Up to 48 hours............<12.0 mg/dL  3-5 days..................<15.0 mg/dL  6-29 days.................<15.0  "mg/dL       Alkaline Phosphatase   Date Value Ref Range Status   05/29/2024 55 55 - 135 U/L Final     AST   Date Value Ref Range Status   05/29/2024 12 10 - 40 U/L Final     ALT   Date Value Ref Range Status   05/29/2024 7 (L) 10 - 44 U/L Final     Anion Gap   Date Value Ref Range Status   05/29/2024 13 8 - 16 mmol/L Final     eGFR if    Date Value Ref Range Status   04/28/2022 40.6 (A) >60 mL/min/1.73 m^2 Final     eGFR if non    Date Value Ref Range Status   04/28/2022 35.3 (A) >60 mL/min/1.73 m^2 Final     Comment:     Calculation used to obtain the estimated glomerular filtration  rate (eGFR) is the CKD-EPI equation.        Lab Results   Component Value Date    CEA 1.3 07/23/2020     No results found for: "PSA"        Assessment/Plan:     Problem List Items Addressed This Visit       Malignant neoplasm of lower lobe of right lung - Primary     I viewed the PET images and reviewed the report.  She appears to have had a positive response to the chemotherapy.  I discussed the use of maintenance therapy with single agent Alimta today and she feels comfortable moving forward with this.  Will plan on imaging every three months and will continue to see her every 3 weeks for chemotherapy monitoring.  Will resume therapy this week if scheduling allows.           Iron deficiency anemia due to chronic blood loss     Patient is doing ok.  She was down to 7.8g/dl and is now up to 9g/dl without transfusion.  Will need to monitor this closely during therapy.  Will likely need transfusion at higher levels given her pulmonary disease.          Hypertension associated with diabetes     BP is up a bit and this is likely an element of white coat syndrome.  I discussed this today and will have patient check this at home.  Will call if this remains high.                 Discussion:     Follow up in about 3 weeks (around 6/24/2024) for NP visit and 6 with me.      Electronically signed by Virgil BAE" Prema

## 2024-06-03 NOTE — ASSESSMENT & PLAN NOTE
Patient is doing ok.  She was down to 7.8g/dl and is now up to 9g/dl without transfusion.  Will need to monitor this closely during therapy.  Will likely need transfusion at higher levels given her pulmonary disease.

## 2024-06-05 ENCOUNTER — LAB VISIT (OUTPATIENT)
Dept: LAB | Facility: HOSPITAL | Age: 84
End: 2024-06-05
Attending: NURSE PRACTITIONER
Payer: MEDICARE

## 2024-06-05 DIAGNOSIS — C34.31 MALIGNANT NEOPLASM OF LOWER LOBE OF RIGHT LUNG: ICD-10-CM

## 2024-06-05 LAB
ALBUMIN SERPL BCP-MCNC: 3.4 G/DL (ref 3.5–5.2)
ALP SERPL-CCNC: 59 U/L (ref 55–135)
ALT SERPL W/O P-5'-P-CCNC: 7 U/L (ref 10–44)
ANION GAP SERPL CALC-SCNC: 6 MMOL/L (ref 8–16)
AST SERPL-CCNC: 11 U/L (ref 10–40)
BASOPHILS # BLD AUTO: 0.03 K/UL (ref 0–0.2)
BASOPHILS NFR BLD: 0.5 % (ref 0–1.9)
BILIRUB SERPL-MCNC: 0.3 MG/DL (ref 0.1–1)
BUN SERPL-MCNC: 24 MG/DL (ref 8–23)
CALCIUM SERPL-MCNC: 9.2 MG/DL (ref 8.7–10.5)
CHLORIDE SERPL-SCNC: 103 MMOL/L (ref 95–110)
CO2 SERPL-SCNC: 31 MMOL/L (ref 23–29)
CREAT SERPL-MCNC: 1.2 MG/DL (ref 0.5–1.4)
DIFFERENTIAL METHOD BLD: ABNORMAL
EOSINOPHIL # BLD AUTO: 0.1 K/UL (ref 0–0.5)
EOSINOPHIL NFR BLD: 1.3 % (ref 0–8)
ERYTHROCYTE [DISTWIDTH] IN BLOOD BY AUTOMATED COUNT: 15.4 % (ref 11.5–14.5)
EST. GFR  (NO RACE VARIABLE): 44.9 ML/MIN/1.73 M^2
GLUCOSE SERPL-MCNC: 128 MG/DL (ref 70–110)
HCT VFR BLD AUTO: 31.2 % (ref 37–48.5)
HGB BLD-MCNC: 9.4 G/DL (ref 12–16)
IMM GRANULOCYTES # BLD AUTO: 0.02 K/UL (ref 0–0.04)
IMM GRANULOCYTES NFR BLD AUTO: 0.3 % (ref 0–0.5)
LYMPHOCYTES # BLD AUTO: 0.7 K/UL (ref 1–4.8)
LYMPHOCYTES NFR BLD: 10.5 % (ref 18–48)
MAGNESIUM SERPL-MCNC: 1.8 MG/DL (ref 1.6–2.6)
MCH RBC QN AUTO: 31.5 PG (ref 27–31)
MCHC RBC AUTO-ENTMCNC: 30.1 G/DL (ref 32–36)
MCV RBC AUTO: 105 FL (ref 82–98)
MONOCYTES # BLD AUTO: 0.7 K/UL (ref 0.3–1)
MONOCYTES NFR BLD: 10.8 % (ref 4–15)
NEUTROPHILS # BLD AUTO: 4.9 K/UL (ref 1.8–7.7)
NEUTROPHILS NFR BLD: 76.6 % (ref 38–73)
NRBC BLD-RTO: 0 /100 WBC
PLATELET # BLD AUTO: 269 K/UL (ref 150–450)
PMV BLD AUTO: 8.8 FL (ref 9.2–12.9)
POTASSIUM SERPL-SCNC: 4.3 MMOL/L (ref 3.5–5.1)
PROT SERPL-MCNC: 7 G/DL (ref 6–8.4)
RBC # BLD AUTO: 2.98 M/UL (ref 4–5.4)
SODIUM SERPL-SCNC: 140 MMOL/L (ref 136–145)
WBC # BLD AUTO: 6.4 K/UL (ref 3.9–12.7)

## 2024-06-05 PROCEDURE — 83735 ASSAY OF MAGNESIUM: CPT | Performed by: NURSE PRACTITIONER

## 2024-06-05 PROCEDURE — 36415 COLL VENOUS BLD VENIPUNCTURE: CPT | Performed by: NURSE PRACTITIONER

## 2024-06-05 PROCEDURE — 80053 COMPREHEN METABOLIC PANEL: CPT | Performed by: NURSE PRACTITIONER

## 2024-06-05 PROCEDURE — 85025 COMPLETE CBC W/AUTO DIFF WBC: CPT | Performed by: NURSE PRACTITIONER

## 2024-06-12 ENCOUNTER — LAB VISIT (OUTPATIENT)
Dept: LAB | Facility: HOSPITAL | Age: 84
End: 2024-06-12
Attending: NURSE PRACTITIONER
Payer: MEDICARE

## 2024-06-12 DIAGNOSIS — C34.31 MALIGNANT NEOPLASM OF LOWER LOBE OF RIGHT LUNG: ICD-10-CM

## 2024-06-12 LAB
ALBUMIN SERPL BCP-MCNC: 3.5 G/DL (ref 3.5–5.2)
ALP SERPL-CCNC: 63 U/L (ref 55–135)
ALT SERPL W/O P-5'-P-CCNC: 9 U/L (ref 10–44)
ANION GAP SERPL CALC-SCNC: 9 MMOL/L (ref 8–16)
AST SERPL-CCNC: 13 U/L (ref 10–40)
BASOPHILS # BLD AUTO: 0.05 K/UL (ref 0–0.2)
BASOPHILS NFR BLD: 0.6 % (ref 0–1.9)
BILIRUB SERPL-MCNC: 0.4 MG/DL (ref 0.1–1)
BUN SERPL-MCNC: 23 MG/DL (ref 8–23)
CALCIUM SERPL-MCNC: 9.1 MG/DL (ref 8.7–10.5)
CHLORIDE SERPL-SCNC: 104 MMOL/L (ref 95–110)
CO2 SERPL-SCNC: 28 MMOL/L (ref 23–29)
CREAT SERPL-MCNC: 1.2 MG/DL (ref 0.5–1.4)
DIFFERENTIAL METHOD BLD: ABNORMAL
EOSINOPHIL # BLD AUTO: 0.1 K/UL (ref 0–0.5)
EOSINOPHIL NFR BLD: 1.2 % (ref 0–8)
ERYTHROCYTE [DISTWIDTH] IN BLOOD BY AUTOMATED COUNT: 14.8 % (ref 11.5–14.5)
EST. GFR  (NO RACE VARIABLE): 44.9 ML/MIN/1.73 M^2
GLUCOSE SERPL-MCNC: 138 MG/DL (ref 70–110)
HCT VFR BLD AUTO: 33.4 % (ref 37–48.5)
HGB BLD-MCNC: 9.9 G/DL (ref 12–16)
IMM GRANULOCYTES # BLD AUTO: 0.03 K/UL (ref 0–0.04)
IMM GRANULOCYTES NFR BLD AUTO: 0.4 % (ref 0–0.5)
LYMPHOCYTES # BLD AUTO: 0.9 K/UL (ref 1–4.8)
LYMPHOCYTES NFR BLD: 12.1 % (ref 18–48)
MAGNESIUM SERPL-MCNC: 1.9 MG/DL (ref 1.6–2.6)
MCH RBC QN AUTO: 30.8 PG (ref 27–31)
MCHC RBC AUTO-ENTMCNC: 29.6 G/DL (ref 32–36)
MCV RBC AUTO: 104 FL (ref 82–98)
MONOCYTES # BLD AUTO: 0.9 K/UL (ref 0.3–1)
MONOCYTES NFR BLD: 10.9 % (ref 4–15)
NEUTROPHILS # BLD AUTO: 5.8 K/UL (ref 1.8–7.7)
NEUTROPHILS NFR BLD: 74.8 % (ref 38–73)
NRBC BLD-RTO: 0 /100 WBC
PLATELET # BLD AUTO: 288 K/UL (ref 150–450)
PMV BLD AUTO: 8.8 FL (ref 9.2–12.9)
POTASSIUM SERPL-SCNC: 3.9 MMOL/L (ref 3.5–5.1)
PROT SERPL-MCNC: 7.2 G/DL (ref 6–8.4)
RBC # BLD AUTO: 3.21 M/UL (ref 4–5.4)
SODIUM SERPL-SCNC: 141 MMOL/L (ref 136–145)
WBC # BLD AUTO: 7.79 K/UL (ref 3.9–12.7)

## 2024-06-12 PROCEDURE — 80053 COMPREHEN METABOLIC PANEL: CPT | Performed by: NURSE PRACTITIONER

## 2024-06-12 PROCEDURE — 36415 COLL VENOUS BLD VENIPUNCTURE: CPT | Performed by: NURSE PRACTITIONER

## 2024-06-12 PROCEDURE — 85025 COMPLETE CBC W/AUTO DIFF WBC: CPT | Performed by: NURSE PRACTITIONER

## 2024-06-12 PROCEDURE — 83735 ASSAY OF MAGNESIUM: CPT | Performed by: NURSE PRACTITIONER

## 2024-06-19 ENCOUNTER — OFFICE VISIT (OUTPATIENT)
Facility: CLINIC | Age: 84
End: 2024-06-19
Payer: MEDICARE

## 2024-06-19 ENCOUNTER — HOSPITAL ENCOUNTER (OUTPATIENT)
Dept: RADIOLOGY | Facility: HOSPITAL | Age: 84
Discharge: HOME | End: 2024-06-19
Attending: INTERNAL MEDICINE
Payer: MEDICARE

## 2024-06-19 ENCOUNTER — TELEPHONE (OUTPATIENT)
Dept: PULMONOLOGY | Facility: HOSPITAL | Age: 84
End: 2024-06-19

## 2024-06-19 ENCOUNTER — OFFICE VISIT (OUTPATIENT)
Dept: PULMONOLOGY | Facility: CLINIC | Age: 84
End: 2024-06-19
Payer: MEDICARE

## 2024-06-19 VITALS
SYSTOLIC BLOOD PRESSURE: 150 MMHG | WEIGHT: 115.19 LBS | DIASTOLIC BLOOD PRESSURE: 62 MMHG | OXYGEN SATURATION: 97 % | HEART RATE: 74 BPM | BODY MASS INDEX: 20.41 KG/M2

## 2024-06-19 VITALS
HEART RATE: 69 BPM | RESPIRATION RATE: 16 BRPM | HEIGHT: 63 IN | BODY MASS INDEX: 20.34 KG/M2 | DIASTOLIC BLOOD PRESSURE: 77 MMHG | TEMPERATURE: 98 F | SYSTOLIC BLOOD PRESSURE: 184 MMHG | WEIGHT: 114.81 LBS

## 2024-06-19 DIAGNOSIS — C34.91 BRONCHOGENIC CANCER OF RIGHT LUNG: Primary | ICD-10-CM

## 2024-06-19 DIAGNOSIS — C34.91 BRONCHOGENIC CANCER OF RIGHT LUNG: ICD-10-CM

## 2024-06-19 DIAGNOSIS — C34.31 MALIGNANT NEOPLASM OF LOWER LOBE OF RIGHT LUNG: Primary | ICD-10-CM

## 2024-06-19 DIAGNOSIS — J44.9 CHRONIC OBSTRUCTIVE PULMONARY DISEASE, UNSPECIFIED COPD TYPE: ICD-10-CM

## 2024-06-19 DIAGNOSIS — I10 HYPERTENSION, UNSPECIFIED TYPE: ICD-10-CM

## 2024-06-19 PROCEDURE — 1159F MED LIST DOCD IN RCRD: CPT | Mod: HCNC,CPTII,S$GLB, | Performed by: INTERNAL MEDICINE

## 2024-06-19 PROCEDURE — 1126F AMNT PAIN NOTED NONE PRSNT: CPT | Mod: HCNC,CPTII,S$GLB, | Performed by: INTERNAL MEDICINE

## 2024-06-19 PROCEDURE — 99999 PR PBB SHADOW E&M-EST. PATIENT-LVL IV: CPT | Mod: PBBFAC,HCNC,, | Performed by: INTERNAL MEDICINE

## 2024-06-19 PROCEDURE — 3078F DIAST BP <80 MM HG: CPT | Mod: HCNC,CPTII,S$GLB, | Performed by: NURSE PRACTITIONER

## 2024-06-19 PROCEDURE — 3077F SYST BP >= 140 MM HG: CPT | Mod: HCNC,CPTII,S$GLB, | Performed by: NURSE PRACTITIONER

## 2024-06-19 PROCEDURE — 1160F RVW MEDS BY RX/DR IN RCRD: CPT | Mod: HCNC,CPTII,S$GLB, | Performed by: NURSE PRACTITIONER

## 2024-06-19 PROCEDURE — 99215 OFFICE O/P EST HI 40 MIN: CPT | Mod: HCNC,S$GLB,, | Performed by: NURSE PRACTITIONER

## 2024-06-19 PROCEDURE — 71046 X-RAY EXAM CHEST 2 VIEWS: CPT | Mod: TC

## 2024-06-19 PROCEDURE — 1157F ADVNC CARE PLAN IN RCRD: CPT | Mod: HCNC,CPTII,S$GLB, | Performed by: NURSE PRACTITIONER

## 2024-06-19 PROCEDURE — 1157F ADVNC CARE PLAN IN RCRD: CPT | Mod: HCNC,CPTII,S$GLB, | Performed by: INTERNAL MEDICINE

## 2024-06-19 PROCEDURE — 3077F SYST BP >= 140 MM HG: CPT | Mod: HCNC,CPTII,S$GLB, | Performed by: INTERNAL MEDICINE

## 2024-06-19 PROCEDURE — 1101F PT FALLS ASSESS-DOCD LE1/YR: CPT | Mod: HCNC,CPTII,S$GLB, | Performed by: NURSE PRACTITIONER

## 2024-06-19 PROCEDURE — 3288F FALL RISK ASSESSMENT DOCD: CPT | Mod: HCNC,CPTII,S$GLB, | Performed by: NURSE PRACTITIONER

## 2024-06-19 PROCEDURE — 99999 PR PBB SHADOW E&M-EST. PATIENT-LVL IV: CPT | Mod: PBBFAC,HCNC,, | Performed by: NURSE PRACTITIONER

## 2024-06-19 PROCEDURE — G2211 COMPLEX E/M VISIT ADD ON: HCPCS | Mod: HCNC,S$GLB,, | Performed by: NURSE PRACTITIONER

## 2024-06-19 PROCEDURE — 1159F MED LIST DOCD IN RCRD: CPT | Mod: HCNC,CPTII,S$GLB, | Performed by: NURSE PRACTITIONER

## 2024-06-19 PROCEDURE — 1126F AMNT PAIN NOTED NONE PRSNT: CPT | Mod: HCNC,CPTII,S$GLB, | Performed by: NURSE PRACTITIONER

## 2024-06-19 PROCEDURE — 1101F PT FALLS ASSESS-DOCD LE1/YR: CPT | Mod: HCNC,CPTII,S$GLB, | Performed by: INTERNAL MEDICINE

## 2024-06-19 PROCEDURE — 71046 X-RAY EXAM CHEST 2 VIEWS: CPT | Mod: 26,,, | Performed by: RADIOLOGY

## 2024-06-19 PROCEDURE — 3078F DIAST BP <80 MM HG: CPT | Mod: HCNC,CPTII,S$GLB, | Performed by: INTERNAL MEDICINE

## 2024-06-19 PROCEDURE — 3288F FALL RISK ASSESSMENT DOCD: CPT | Mod: HCNC,CPTII,S$GLB, | Performed by: INTERNAL MEDICINE

## 2024-06-19 PROCEDURE — 99213 OFFICE O/P EST LOW 20 MIN: CPT | Mod: HCNC,S$GLB,, | Performed by: INTERNAL MEDICINE

## 2024-06-19 NOTE — TELEPHONE ENCOUNTER
The patient's chest x-ray is stable with right-sided disease and a clear left lung.  Told the patient.

## 2024-06-19 NOTE — PROGRESS NOTES
PROGRESS NOTE    Subjective:       Patient ID: Alexa Otero is a 84 y.o. female.    9/21/2020  Bilateral oophorectomy  Right endometrioid Carcinoma  [id number]  S/p Carbo/Taxol x 6    8/15/2023 PET:  IMPRESSION:  1. Multifocal right lower lobe and right pleural FDG hypermetabolism as described, with associated right lower lobe consolidation. These are nonspecific and could be of infectious or inflammatory etiology in the clinical setting of chronic pneumonia, and or metastatic disease. Correlation with previous bronchoscopy results is needed.  2. Multiple new non hypermetabolic pulmonary nodules in both lungs, which are generally below size resolution for PET, but suspicious for pulmonary metastases.  3. No additional findings of FDG avid metastatic disease.    10/31/2023 RLL Lobectomy:  LUNG SYNOPTIC REPORT   PROCEDURE:     Lobectomy.   SPECIMEN LATERALITY:    Right   TUMOR SITE:     Lower lobe.   TUMOR SIZE:     Cannot be determined, 4.5 cm area of cavitation but    tumor appears to involve most if not all of the resected lobe.   TUMOR FOCALITY:    Single tumor.   HISTOLOGIC TYPE:    Invasive mucinous adenocarcinoma with pneumonic    pattern.   VISCEROPLEURAL INVASION:   Present   LYMPHOVASCULAR INVASION:   Not identified.   SPREAD OF TUMOR THROUGH AIR SPACES:  Present   DIRECT INVASION OF ADJACENT STRUCTURES: No adjacent structures present.   MARGINS:     All margins are uninvolved by tumor, margins examined:         Bronchial, distance of invasion of tumor from closest         margin: cannot be determined.   TREATMENT EFFECT:    No known presurgical therapy.   ADDITIONAL PATHOLOGIC FINDINGS:  Organized pulmonary emboli, pulmonary    infarct.   REGIONAL LYMPH NODES:    NUMBER OF LYMPH NODES INVOLVED:  Zero, number of lymph nodes examined:     1, yue         structures examined: 10 (hilar)   PATHOLOGIC STAGE CLASSIFICATION    OF PRIMARY TUMOR:    pT3     REGIONAL LYMPH NODES:   pN0     Comment: The sections show invasive mucinous adenocarcinoma with    findings that suggest so-called pneumonic pattern. there is diffuse    involvement with tumor present in all the histologic sections, often    with a lepidic pattern suggesting spread through the air spaces. The    reported CT findings of a consolidated appearance correlates with the    histologic findings and the reported clinical presentation of a    year-long cough productive of purulent mucus. Mucus is also a common    presentation of this somewhat uncommon pulmonary malignancy. These    tumors are two complete stages pT3 when there appears to be involvement    of the entire lobe; this appears to be appropriate in this case. There    are also organizing/organized pulmonary emboli within the vasculature,    possibly indicating hypercoagulability of malignancy as can be seen    especially with mucinous tumors. The tissue sampled in block 2F is    likely an infarct related to this. The history of endometrioid    adenocarcinoma is noted, but the current tumor represents a primary    lung malignancy and is unrelated to the prior cancer.     10/13/2023-Echo:  Normal systolic function with EF 55-60%  Grade I diastolic dysfunction    12/10/2023-CT CAP:  Progression of the alveolar consolidation in the right mid and lower lung with moderate size right pleural effusion. There is progression of the airspace disease within the right upper lobe with patchy groundglass opacity left upper lobe and left lung base. Findings are compatible with multifocal.     12/15/2023-Bronchoscopy:  LUNG, RIGHT MIDDLE LOBE, BIOPSY:   - ATYPICAL ADENOMATOUS HYPERPLASIA.   - IN A BACKGROUND OF REACTIVE FIBROSIS AND FIBRIN.     Comment:   Given the patient's history, this lesion is concerning for well    differentiated lepidic type adenocarcinoma but is quantitatively    insufficient (approximately 1 mm) for a definite diagnosis.     Multiple  additional leveled sections were examined.     Carbo/Pemetrexed x 4  1/25/2024-4/11/2024 4/23/2024-PET:  1. Interval improvement in hypermetabolic right lung airspace opacities, presumably reflecting infectious or inflammatory etiology.  2. No convincing evidence of recurrent neoplasm or metastatic disease.  3. Interval increased size of right pleural effusion.  4. Additional stable observations as described.      Chief Complaint:  Stage IV Lung cancer follow up    History of Present Illness:   Alexa Otero is a 84 y.o. female who presents for follow up of lung cancer.      Patient has completed the carbo/pem- Disucused restarting treatment with Alimta single agent.  She is feeling better and her labs are recovering.    Family and Social history reviewed and is unchanged from 12/1/2023             Current Outpatient Medications:     amiodarone (PACERONE) 200 MG Tab, Take 1 tablet (200 mg total) by mouth 2 (two) times daily., Disp: 60 tablet, Rfl: 11    aspirin (ECOTRIN) 81 MG EC tablet, Take 81 mg by mouth once daily., Disp: , Rfl:     benazepriL (LOTENSIN) 40 MG tablet, Take 0.5 tablets (20 mg total) by mouth once daily., Disp: 45 tablet, Rfl: 3    clopidogreL (PLAVIX) 75 mg tablet, Take 1 tablet (75 mg total) by mouth once daily., Disp: 90 tablet, Rfl: 2    folic acid (FOLVITE) 1 MG tablet, Take 1 tablet (1 mg total) by mouth once daily., Disp: 100 tablet, Rfl: 3    gabapentin (NEURONTIN) 100 MG capsule, Take 1 capsule (100 mg total) by mouth 2 (two) times daily., Disp: 180 capsule, Rfl: 3    HYDROcodone-acetaminophen (NORCO) 5-325 mg per tablet, Take 1 tablet by mouth every 6 (six) hours as needed for Pain. (Patient not taking: Reported on 6/19/2024), Disp: , Rfl:     magnesium oxide (MAG-OX) 400 mg (241.3 mg magnesium) tablet, Take 1 tablet (400 mg total) by mouth once daily. Patient requested refill, Disp: 90 tablet, Rfl: 3    melatonin 5 mg Chew, Take 1 tablet by mouth nightly as needed (insomnia)., Disp:  ", Rfl:     metoprolol succinate (TOPROL-XL) 25 MG 24 hr tablet, Take 1 tablet (25 mg total) by mouth once daily., Disp: 90 tablet, Rfl: 3    omeprazole (PRILOSEC) 40 MG capsule, Take 1 capsule by mouth daily., Disp: 90 capsule, Rfl: 2    potassium chloride (K-TAB) 20 mEq, Take 1 tablet by mouth once daily., Disp: , Rfl:     potassium chloride SA (K-DUR,KLOR-CON) 20 MEQ tablet, Take 1 tablet daily, Disp: 90 tablet, Rfl: 1    VIT A/VIT C/VIT E/ZINC/COPPER (ICAPS AREDS ORAL), Take 1 capsule by mouth 2 (two) times a day. , Disp: , Rfl:     LORazepam (ATIVAN) 1 MG tablet, Take 1 tablet (1 mg total) by mouth every 6 (six) hours as needed for Anxiety (insomnia)., Disp: 30 tablet, Rfl: 2    ondansetron (ZOFRAN) 8 MG tablet, Take 1 tablet (8 mg total) by mouth every 8 (eight) hours as needed. (Patient not taking: Reported on 3/20/2024), Disp: 30 tablet, Rfl: 5    promethazine (PHENERGAN) 25 MG tablet, Take 1 tablet (25 mg total) by mouth every 4 (four) hours as needed for Nausea. (Patient not taking: Reported on 3/20/2024), Disp: 30 tablet, Rfl: 5    Current Facility-Administered Medications:     diphenhydrAMINE injection 25 mg, 25 mg, Intravenous, Once, Patito Gibson, NP-C    Review of Systems   Constitutional:  Positive for malaise/fatigue. Negative for fever.   HENT:  Negative for hearing loss.    Respiratory:  Positive for cough and shortness of breath.    Cardiovascular:  Negative for chest pain and leg swelling.   Gastrointestinal:  Negative for abdominal pain, constipation and diarrhea.   Genitourinary:  Negative for dysuria and hematuria.   Musculoskeletal:  Negative for joint pain and myalgias.   Neurological:  Positive for weakness. Negative for headaches.   Psychiatric/Behavioral:  Negative for depression.          Objective:       Physical Examination:     BP (!) 184/77   Pulse 69   Temp 97.7 °F (36.5 °C)   Resp 16   Ht 5' 3" (1.6 m)   Wt 52.1 kg (114 lb 12.8 oz)   BMI 20.34 kg/m²     Physical " Exam  Constitutional:       Appearance: Normal appearance.   HENT:      Head: Normocephalic and atraumatic.      Nose: Nose normal.      Mouth/Throat:      Mouth: Mucous membranes are moist.   Eyes:      General: No scleral icterus.     Conjunctiva/sclera: Conjunctivae normal.   Cardiovascular:      Rate and Rhythm: Normal rate.   Pulmonary:      Effort: Pulmonary effort is normal.      Comments: Portable oxygen  Abdominal:      General: Abdomen is flat.   Musculoskeletal:      Right lower leg: No edema.      Left lower leg: No edema.   Skin:     General: Skin is warm and dry.   Neurological:      General: No focal deficit present.      Mental Status: She is alert and oriented to person, place, and time.   Psychiatric:         Mood and Affect: Mood normal.         Behavior: Behavior normal.         Thought Content: Thought content normal.         Judgment: Judgment normal.         Labs:   Recent Results (from the past 336 hour(s))   CBC Auto Differential    Collection Time: 06/19/24 10:28 AM   Result Value Ref Range    WBC 9.04 3.90 - 12.70 K/uL    Hemoglobin 10.3 (L) 12.0 - 16.0 g/dL    Hematocrit 34.0 (L) 37.0 - 48.5 %    Platelets 255 150 - 450 K/uL   CBC Auto Differential    Collection Time: 06/12/24 11:03 AM   Result Value Ref Range    WBC 7.79 3.90 - 12.70 K/uL    Hemoglobin 9.9 (L) 12.0 - 16.0 g/dL    Hematocrit 33.4 (L) 37.0 - 48.5 %    Platelets 288 150 - 450 K/uL     CMP  Sodium   Date Value Ref Range Status   06/19/2024 141 136 - 145 mmol/L Final     Potassium   Date Value Ref Range Status   06/19/2024 3.9 3.5 - 5.1 mmol/L Final     Chloride   Date Value Ref Range Status   06/19/2024 104 95 - 110 mmol/L Final     CO2   Date Value Ref Range Status   06/19/2024 30 (H) 23 - 29 mmol/L Final     Glucose   Date Value Ref Range Status   06/19/2024 136 (H) 70 - 110 mg/dL Final     BUN   Date Value Ref Range Status   06/19/2024 25 (H) 8 - 23 mg/dL Final     Creatinine   Date Value Ref Range Status   06/19/2024 1.1  "0.5 - 1.4 mg/dL Final     Calcium   Date Value Ref Range Status   06/19/2024 9.2 8.7 - 10.5 mg/dL Final     Total Protein   Date Value Ref Range Status   06/19/2024 7.0 6.0 - 8.4 g/dL Final     Albumin   Date Value Ref Range Status   06/19/2024 3.5 3.5 - 5.2 g/dL Final     Total Bilirubin   Date Value Ref Range Status   06/19/2024 0.3 0.1 - 1.0 mg/dL Final     Comment:     For infants and newborns, interpretation of results should be based  on gestational age, weight and in agreement with clinical  observations.    Premature Infant recommended reference ranges:  Up to 24 hours.............<8.0 mg/dL  Up to 48 hours............<12.0 mg/dL  3-5 days..................<15.0 mg/dL  6-29 days.................<15.0 mg/dL       Alkaline Phosphatase   Date Value Ref Range Status   06/19/2024 59 55 - 135 U/L Final     AST   Date Value Ref Range Status   06/19/2024 11 10 - 40 U/L Final     ALT   Date Value Ref Range Status   06/19/2024 8 (L) 10 - 44 U/L Final     Anion Gap   Date Value Ref Range Status   06/19/2024 7 (L) 8 - 16 mmol/L Final     eGFR if    Date Value Ref Range Status   04/28/2022 40.6 (A) >60 mL/min/1.73 m^2 Final     eGFR if non    Date Value Ref Range Status   04/28/2022 35.3 (A) >60 mL/min/1.73 m^2 Final     Comment:     Calculation used to obtain the estimated glomerular filtration  rate (eGFR) is the CKD-EPI equation.        Lab Results   Component Value Date    CEA 1.3 07/23/2020     No results found for: "PSA"        Assessment/Plan:     Problem List Items Addressed This Visit          Pulmonary    Chronic obstructive pulmonary disease, unspecified COPD type       Cardiac/Vascular    HTN (hypertension)       Oncology    Malignant neoplasm of lower lobe of right lung - Primary       Lung Cancer- Restart Alimta cont B12 and folic Acid  2. HTN- Will check BP at home and follow up with PCP  3. SOB- Continue f//u with pulm and portable oxygen.    Discussion:     Follow up in " about 3 weeks (around 7/10/2024) for with Dr. Cassidy and 6 weeks with me.      Electronically signed by Patito Gibson, MSN, APRN, AGNP-C, OCN

## 2024-06-19 NOTE — PROGRESS NOTES
SUBJECTIVE:    Patient ID: Alexa Otero is a 84 y.o. female.    Chief Complaint: Follow-up (3 month follow up Bronchogenic carcinoma)    HPI   The patient returns doing very well.She is going to start Alimpta.Still coughing up a lot of mucus.   Past Medical History:   Diagnosis Date    Arthritis     Cardiac arrest 10/2023    Cataract     Chronic obstructive pulmonary disease, unspecified COPD type 3/20/2024    Colon polyp     Coronary artery disease 3/20/2024    Diabetes mellitus type II 2012    Encounter for blood transfusion     Extra-ovarian endometrioid adenocarcinoma 2020    GERD (gastroesophageal reflux disease)     History of chronic cough     clear productive    Hyperlipidemia     Hypertension     Macular degeneration     Ovarian cancer     PONV (postoperative nausea and vomiting)     Squamous cell carcinoma 1980's    precancer of cervix     Past Surgical History:   Procedure Laterality Date    AUGMENTATION OF BREAST      BRONCHOSCOPY N/A 12/12/2023    Procedure: BRONCHOSCOPY;  Surgeon: Neva Christian MD;  Location: [facility];  Service: ENT;  Laterality: N/A;    BRONCHOSCOPY WITH FLUOROSCOPY Right 05/11/2023    Procedure: BRONCHOSCOPY, WITH FLUOROSCOPY;  Surgeon: Neva Christian MD;  Location: [facility];  Service: Pulmonary;  Laterality: Right;    BRONCHOSCOPY WITH FLUOROSCOPY N/A 12/15/2023    Procedure: BRONCHOSCOPY, WITH FLUOROSCOPY;  Surgeon: Neva Christian MD;  Location: [facility];  Service: Pulmonary;  Laterality: N/A;    CATARACT EXTRACTION BILATERAL W/ ANTERIOR VITRECTOMY Bilateral     CHOLECYSTECTOMY      COLONOSCOPY      COLONOSCOPY N/A 04/20/2023    Procedure: COLONOSCOPY;  Surgeon: Sabino Stephenson MD;  Location: [facility];  Service: Endoscopy;  Laterality: N/A;    CORONARY STENT PLACEMENT  10/2023    x 3    ESOPHAGOGASTRODUODENOSCOPY N/A 06/20/2018    Procedure: EGD (ESOPHAGOGASTRODUODENOSCOPY);  Surgeon: Sabino Stephenson MD;  Location: [facility];  Service: Endoscopy;   Laterality: N/A;    ESOPHAGOGASTRODUODENOSCOPY N/A 03/31/2023    Procedure: EGD (ESOPHAGOGASTRODUODENOSCOPY);  Surgeon: Sabino Stephenson MD;  Location: [facility];  Service: Endoscopy;  Laterality: N/A;    ESOPHAGOGASTRODUODENOSCOPY N/A 12/11/2023    Procedure: EGD (ESOPHAGOGASTRODUODENOSCOPY);  Surgeon: Pretty Salgado MD;  Location: [facility];  Service: Endoscopy;  Laterality: N/A;    HYSTERECTOMY  1976    partial    INJECTION OF ANESTHETIC AGENT AROUND MEDIAL BRANCH NERVES INNERVATING LUMBAR FACET JOINT Right 05/10/2023    Procedure: Block-nerve-Lateral-branch-lumbar;  Surgeon: Agustin Robles MD;  Location: [facility];  Service: Pain Management;  Laterality: Right;  L5 and s1,s2 LBB    INJECTION OF ANESTHETIC AGENT AROUND MEDIAL BRANCH NERVES INNERVATING LUMBAR FACET JOINT Right 05/30/2023    Procedure: Block-nerve-medial branch-lumbar;  Surgeon: Agustin Robles MD;  Location: [facility];  Service: Pain Management;  Laterality: Right;  L5, s1 ,s2 LBB #2    INJECTION OF ANESTHETIC AGENT AROUND NERVE Right 10/31/2023    Procedure: INTERCOSTAL NERVE BLOCK;  Surgeon: Washington Shankar MD;  Location: [facility];  Service: Cardiothoracic;  Laterality: Right;    INJECTION, SACROILIAC JOINT Right 01/26/2023    Procedure: INJECTION,SACROILIAC JOINT;  Surgeon: Agustin Robles MD;  Location: [facility];  Service: Pain Management;  Laterality: Right;    INSERTION OF TUNNELED CENTRAL VENOUS CATHETER (CVC) WITH SUBCUTANEOUS PORT Right 10/19/2020    Procedure: INSERTION, PORT-A-CATH;  Surgeon: Misti Mcfarland MD;  Location: [facility] OR;  Service: General;  Laterality: Right;    INTRAMEDULLARY RODDING OF TROCHANTER OF FEMUR Right 11/28/2021    Procedure: INSERTION, INTRAMEDULLARY LUC, FEMUR, TROCHANTER/RIGHT TFN DR FAJARDO NOTIFIED REP;  Surgeon: Kp Fajardo MD;  Location: [facility];  Service: Orthopedics;  Laterality: Right;  SKIP    ROBOT-ASSISTED LAPAROSCOPIC LYMPHADENECTOMY USING DA NELLA XI N/A 09/21/2020    Procedure: XI ROBOTIC  LYMPHADENECTOMY-pelvic and kell-aortic;  Surgeon: Altagracia Ray MD;  Location: [facility] OR;  Service: OB/GYN;  Laterality: N/A;    ROBOT-ASSISTED LAPAROSCOPIC OMENTECTOMY USING DA NELLA XI N/A 09/21/2020    Procedure: XI ROBOTIC OMENTECTOMY;  Surgeon: Altagracia Ray MD;  Location: [facility] OR;  Service: OB/GYN;  Laterality: N/A;    ROBOT-ASSISTED LAPAROSCOPIC SALPINGO-OOPHORECTOMY USING DA NELLA XI Bilateral 09/21/2020    Procedure: XI ROBOTIC SALPINGO-OOPHORECTOMY;  Surgeon: Altagracia Ray MD;  Location: [facility] OR;  Service: OB/GYN;  Laterality: Bilateral;    THORACOSCOPIC BIOPSY OF PLEURA Right 10/31/2023    Procedure: VATS, WITH PLEURA BIOPSY;  Surgeon: Washington Shankar MD;  Location: [facility];  Service: Cardiothoracic;  Laterality: Right;  FROZEN SECTION   **KRISTEN-ASSISDT**    THORACOTOMY Right 10/31/2023    Procedure: THORACOTOMY;  Surgeon: Washington Shankar MD;  Location: [facility] OR;  Service: Cardiothoracic;  Laterality: Right;    UPPER GASTROINTESTINAL ENDOSCOPY       Family History   Problem Relation Name Age of Onset    Cancer Mother  57        lung    Cancer Father  56        oral    Skin cancer Sister      Skin cancer Brother      Melanoma Brother      Skin cancer Brother      Breast cancer Maternal Grandmother      Breast cancer Paternal Grandmother      Colon polyps Daughter      Psoriasis Neg Hx      Lupus Neg Hx      Eczema Neg Hx      Colon cancer Neg Hx      Crohn's disease Neg Hx      Esophageal cancer Neg Hx      Stomach cancer Neg Hx      Ulcerative colitis Neg Hx          Social History:   Marital Status:   Occupation: Data Unavailable  Alcohol History:  reports current alcohol use of about 1.0 standard drink of alcohol per week.  Tobacco History:  reports that she quit smoking about 43 years ago. Her smoking use included cigarettes. She started smoking about 63 years ago. She has a 20 pack-year smoking history. She has never used smokeless tobacco.  Drug History:  reports no history  of drug use.    Review of patient's allergies indicates:  No Known Allergies    Current Outpatient Medications   Medication Sig Dispense Refill    amiodarone (PACERONE) 200 MG Tab Take 1 tablet (200 mg total) by mouth 2 (two) times daily. 60 tablet 11    aspirin (ECOTRIN) 81 MG EC tablet Take 81 mg by mouth once daily.      benazepriL (LOTENSIN) 40 MG tablet Take 0.5 tablets (20 mg total) by mouth once daily. 45 tablet 3    clopidogreL (PLAVIX) 75 mg tablet Take 1 tablet (75 mg total) by mouth once daily. 90 tablet 2    folic acid (FOLVITE) 1 MG tablet Take 1 tablet (1 mg total) by mouth once daily. 100 tablet 3    gabapentin (NEURONTIN) 100 MG capsule Take 1 capsule (100 mg total) by mouth 2 (two) times daily. 180 capsule 3    magnesium oxide (MAG-OX) 400 mg (241.3 mg magnesium) tablet Take 1 tablet (400 mg total) by mouth once daily. Patient requested refill 90 tablet 3    melatonin 5 mg Chew Take 1 tablet by mouth nightly as needed (insomnia).      metoprolol succinate (TOPROL-XL) 25 MG 24 hr tablet Take 1 tablet (25 mg total) by mouth once daily. 90 tablet 3    omeprazole (PRILOSEC) 40 MG capsule Take 1 capsule by mouth daily. 90 capsule 2    potassium chloride (K-TAB) 20 mEq Take 1 tablet by mouth once daily.      VIT A/VIT C/VIT E/ZINC/COPPER (ICAPS AREDS ORAL) Take 1 capsule by mouth 2 (two) times a day.       albuterol (VENTOLIN HFA) 90 mcg/actuation inhaler Inhale 2 puffs into the lungs every 4 (four) hours as needed for Wheezing or Shortness of Breath. Rescue 6.7 g 1    HYDROcodone-acetaminophen (NORCO) 5-325 mg per tablet Take 1 tablet by mouth every 6 (six) hours as needed for Pain. (Patient not taking: Reported on 6/19/2024)      LORazepam (ATIVAN) 1 MG tablet Take 1 tablet (1 mg total) by mouth every 6 (six) hours as needed for Anxiety (insomnia). 30 tablet 2    ondansetron (ZOFRAN) 8 MG tablet Take 1 tablet (8 mg total) by mouth every 8 (eight) hours as needed. (Patient not taking: Reported on  3/20/2024) 30 tablet 5    potassium chloride SA (K-DUR,KLOR-CON) 20 MEQ tablet Take 1 tablet daily 90 tablet 1    promethazine (PHENERGAN) 25 MG tablet Take 1 tablet (25 mg total) by mouth every 4 (four) hours as needed for Nausea. (Patient not taking: Reported on 3/20/2024) 30 tablet 5     Current Facility-Administered Medications   Medication Dose Route Frequency Provider Last Rate Last Admin    diphenhydrAMINE injection 25 mg  25 mg Intravenous Once Patito Gibson, ZION-C           Last PFT: 5/11/23 FEV1 1.72, no obstruction, no restriction, moderate diffusion defect.    Last CT:3/18/23  IMPRESSION:  No CT evidence for pulmonary embolism.   Focal area of airspace opacity/consolidation involving the posterior segment right lower lobe no significantly changed when compared to prior study.   Emphysematous changes.   Prominent right hilar and subcarinal lymph nodes, may be reactive in nature.   Inferior deformity with minimal increase sclerosis at T11 perhaps suggesting old compression deformity.    PET/CT  4/23/24    1. Interval improvement in hypermetabolic right lung airspace opacities, presumably reflecting infectious or inflammatory etiology.  2. No convincing evidence of recurrent neoplasm or metastatic disease.  3. Interval increased size of right pleural effusion.  4. Additional stable observations as described.    Review of Systems  General: Feeling ok  Eyes: Vision is good. She has macular degeneration.  ENT:  No sinusitis or pharyngitis.   Heart:: No chest pain or palpitations.  Lungs: Coughs, produces clear mucus.  She is still producing a great quantity of clear mucus through the day and night, but it is slightly thinner.  GI:  Back to mostly normal bowel movements with bid stool softerner  : No dysuria, hesitancy, or nocturia.  Musculoskeletal: has R hip pain from time to time  Skin: No lesions or rashes.  Neuro: No headaches or neuropathy.  Lymph: No edema or adenopathy.  Psych: No anxiety or  depression.  Endo:  her weight is stable    OBJECTIVE:      BP (!) 150/62 (BP Location: Right arm, Patient Position: Sitting, BP Method: Medium (Manual))   Pulse 74   Wt 52.3 kg (115 lb 3.2 oz)   SpO2 97% Comment: On 2 liters of Oxygen  BMI 20.41 kg/m²     Physical Exam  GENERAL: Older patient in no distress.  HEENT: Pupils equal and reactive. Extraocular movements intact. Nose intact.      Pharynx moist.  NECK: Supple.   HEART: Regular rate and rhythm. No murmur or gallop auscultated.  LUNGS:  There are decreased breath sounds in the right base and apex there are crackles in the L base. Lung excursion symmetrical. No change in fremitus.  ABDOMEN: Bowel sounds present. Non-tender, no masses palpated.  EXTREMITIES: Normal muscle tone and joint movement, no cyanosis or clubbing.   LYMPHATICS: No adenopathy palpated, no edema.  SKIN: Dry, intact, no lesions.   NEURO: Cranial nerves II-XII intact. Motor strength 5/5 bilaterally, upper and lower extremities.  PSYCH: Appropriate affect.          Assessment:       1. Bronchogenic cancer of right lung                Plan:       Bronchogenic cancer of right lung  -     X-Ray Chest PA And Lateral; Future          The patient knows to call if she needs anything  CXR today  Follow up in about 3 months (around 9/19/2024).

## 2024-06-21 ENCOUNTER — OFFICE VISIT (OUTPATIENT)
Dept: FAMILY MEDICINE | Facility: CLINIC | Age: 84
End: 2024-06-21
Payer: MEDICARE

## 2024-06-21 DIAGNOSIS — E11.69 HYPERLIPIDEMIA ASSOCIATED WITH TYPE 2 DIABETES MELLITUS: ICD-10-CM

## 2024-06-21 DIAGNOSIS — Z95.820 S/P ANGIOPLASTY WITH STENT: ICD-10-CM

## 2024-06-21 DIAGNOSIS — E11.22 CONTROLLED TYPE 2 DIABETES MELLITUS WITH STAGE 3 CHRONIC KIDNEY DISEASE, WITHOUT LONG-TERM CURRENT USE OF INSULIN: ICD-10-CM

## 2024-06-21 DIAGNOSIS — I15.2 HYPERTENSION ASSOCIATED WITH DIABETES: Primary | ICD-10-CM

## 2024-06-21 DIAGNOSIS — E78.5 HYPERLIPIDEMIA ASSOCIATED WITH TYPE 2 DIABETES MELLITUS: ICD-10-CM

## 2024-06-21 DIAGNOSIS — C34.31 PRIMARY MALIGNANT NEOPLASM OF BRONCHUS OF RIGHT LOWER LOBE: ICD-10-CM

## 2024-06-21 DIAGNOSIS — C56.1 CARCINOMA OF RIGHT OVARY: ICD-10-CM

## 2024-06-21 DIAGNOSIS — N18.30 CONTROLLED TYPE 2 DIABETES MELLITUS WITH STAGE 3 CHRONIC KIDNEY DISEASE, WITHOUT LONG-TERM CURRENT USE OF INSULIN: ICD-10-CM

## 2024-06-21 DIAGNOSIS — K21.9 GASTROESOPHAGEAL REFLUX DISEASE, UNSPECIFIED WHETHER ESOPHAGITIS PRESENT: ICD-10-CM

## 2024-06-21 DIAGNOSIS — E11.59 HYPERTENSION ASSOCIATED WITH DIABETES: Primary | ICD-10-CM

## 2024-06-21 DIAGNOSIS — I25.10 CORONARY ARTERY DISEASE, UNSPECIFIED VESSEL OR LESION TYPE, UNSPECIFIED WHETHER ANGINA PRESENT, UNSPECIFIED WHETHER NATIVE OR TRANSPLANTED HEART: ICD-10-CM

## 2024-06-21 PROCEDURE — 3288F FALL RISK ASSESSMENT DOCD: CPT | Mod: HCNC,CPTII,S$GLB, | Performed by: FAMILY MEDICINE

## 2024-06-21 PROCEDURE — 1159F MED LIST DOCD IN RCRD: CPT | Mod: HCNC,CPTII,S$GLB, | Performed by: FAMILY MEDICINE

## 2024-06-21 PROCEDURE — 3075F SYST BP GE 130 - 139MM HG: CPT | Mod: HCNC,CPTII,S$GLB, | Performed by: FAMILY MEDICINE

## 2024-06-21 PROCEDURE — 3079F DIAST BP 80-89 MM HG: CPT | Mod: HCNC,CPTII,S$GLB, | Performed by: FAMILY MEDICINE

## 2024-06-21 PROCEDURE — 99999 PR PBB SHADOW E&M-EST. PATIENT-LVL III: CPT | Mod: PBBFAC,HCNC,, | Performed by: FAMILY MEDICINE

## 2024-06-21 PROCEDURE — 1101F PT FALLS ASSESS-DOCD LE1/YR: CPT | Mod: HCNC,CPTII,S$GLB, | Performed by: FAMILY MEDICINE

## 2024-06-21 PROCEDURE — G2211 COMPLEX E/M VISIT ADD ON: HCPCS | Mod: HCNC,S$GLB,, | Performed by: FAMILY MEDICINE

## 2024-06-21 PROCEDURE — 99214 OFFICE O/P EST MOD 30 MIN: CPT | Mod: HCNC,S$GLB,, | Performed by: FAMILY MEDICINE

## 2024-06-21 PROCEDURE — 1157F ADVNC CARE PLAN IN RCRD: CPT | Mod: HCNC,CPTII,S$GLB, | Performed by: FAMILY MEDICINE

## 2024-06-21 PROCEDURE — 1160F RVW MEDS BY RX/DR IN RCRD: CPT | Mod: HCNC,CPTII,S$GLB, | Performed by: FAMILY MEDICINE

## 2024-06-21 NOTE — PROGRESS NOTES
Subjective:       Patient ID: Alexa Otero is a 84 y.o. female.    Chief Complaint: Follow-up (3mth f/u)    HPI  Review of Systems   Constitutional:  Positive for fatigue. Negative for unexpected weight change.   Respiratory:  Negative for chest tightness and shortness of breath.    Cardiovascular:  Negative for chest pain, palpitations and leg swelling.   Gastrointestinal:  Negative for abdominal pain.   Musculoskeletal:  Negative for arthralgias.   Neurological:  Negative for dizziness, syncope, light-headedness and headaches.       Patient Active Problem List   Diagnosis    Sciatica    Syncope and collapse    Polycythemia, secondary    Hyperlipidemia associated with type 2 diabetes mellitus    Controlled type 2 diabetes mellitus with stage 3 chronic kidney disease, without long-term current use of insulin    Hypertension associated with diabetes    Vitamin D deficiency disease    Gastroesophageal reflux disease    History of Kwon's esophagus    Pancreatic pseudocyst/cyst    Hepatic cyst    Low back pain radiating to both legs    Primary osteoarthritis of right ankle    Kwon's esophagus    Chronic low back pain    Dupuytren's contracture of both hands    Right lower quadrant abdominal swelling, mass and lump    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     Closed fracture of femur, intertrochanteric, right, with routine healing, subsequent encounter    Right hip pain    Weakness of right lower extremity    Impaired functional mobility and activity tolerance    Maintenance chemotherapy    Atherosclerosis of aorta    History of colon polyps    Hypomagnesemia    Hypokalemia    Cardiopulmonary arrest    ACP (advance care planning)    Paroxysmal atrial fibrillation    Lung infiltrate    Status post lobectomy of lung    Malignant neoplasm of lower lobe of right lung    Elevated troponin    Pleural effusion    Epigastric discomfort    Iron deficiency anemia due to chronic blood loss    Primary malignant  neoplasm of bronchus of right lower lobe    Oxygen dependent    Thrombocytopenia    Coronary artery disease    Peripheral neuropathy due to chemotherapy    S/P angioplasty with stent    Chronic obstructive pulmonary disease, unspecified COPD type    Sacroiliitis, not elsewhere classified    Pleural effusion, right    HTN (hypertension)     Patient is here for a chronic conditions follow up.    85 y/o female with past med h/o ovairan endometrioid ca s/p excision/chemo 2021, currently undergoing treatment for bronchogenic lung ca. ,CAD s/p stents, afib and cardiac arrest/vfib after VATS 10/2023 with lobectomy, type 2 DM    Reviewed labs 6/2024  CKD stage 3, mild anemia    Interim hx:  Pulm Dr. Christian doing chemo currently for bronchogenic ca/mucinous adenoca lung . Has chronic hypoxia requiring oxygen per NC 2 L   Heme/onc Dr. Cassidy and gyn onc Dr. Ray -      Underwent right SO 9/20 which path revealed right ovarian endometrioid ca.  . completed chemo 2/2021  Right lung cancer s/p lobectomy diagnosed 2023 (see HPI)        Card Dr. Julio/ [facility] CAD, HPL. Afib after VATS. On pacerone , ASA, plavix and toprol. S/p angiogram with stents placed 10/2023 complicated by vfib and CP arrest after stent possibly due to low mag.   Was on eliquis but now asa and plavix        GI Dr. Stephenson  colonoscopy 4/23 -2 polyps 1 tubular adenoma  EGD 3/23 -7 gastric polyps and small hiatal hernia, gastritis-path benign . H pylori neg.  Pepcid d/c'd and prilosec added 12/23  GI Dr. Stephenson treating GERD, h/o barretts diagnosed 2015 Dr. Jimenez.  treated with HALO and Stretta by Dr. Samuel.        Pain mgmt Dr. Robles treating SI joint pain (s/p injection 1/23) and lumbar ddd and spondylosis s/p block 5/23.  C/o persistent pain  Right hip-can't sleep at night. Continuing to do PT. Taking gabapentin 100mg bid , mobic 15mg daily. Steps, walking and lying on that side the worst   h/o right hip fx 2021          Heme/onc Dr. aCssidy and gyn onc  Dr. Ray -      Underwent right SO 9/20 which path revealed right ovarian endometrioid ca.  . completed chemo 2/2021  Right lung cancer s/p lobectomy diagnosed 2023 (see HPI)      Endo Type 2 DM- a1c 5.3 11/23 urine micro neg, lipids at goal. On ACE I and zocor. Dks tage 3      Eye Dr. Diehl -[facility]     Podiatry Dr. Ding DM neuropathy 6/23        Ortho knee pain- Dr. Mojica started on mobic 3/10/20. ? CHERELLE       Objective:      Physical Exam  Vitals and nursing note reviewed.   Constitutional:       Appearance: She is well-developed.      Comments: On 2L NC oxygen   Cardiovascular:      Rate and Rhythm: Normal rate and regular rhythm.      Heart sounds: Normal heart sounds.   Pulmonary:      Effort: Pulmonary effort is normal.      Breath sounds: Normal breath sounds.   Skin:     General: Skin is warm and dry.   Neurological:      Mental Status: She is alert and oriented to person, place, and time.         Assessment:       1. Hypertension associated with diabetes    2. Primary malignant neoplasm of bronchus of right lower lobe    3. Carcinoma of right ovary    4. Coronary artery disease, unspecified vessel or lesion type, unspecified whether angina present, unspecified whether native or transplanted heart    5. S/P angioplasty with stent    6. Hyperlipidemia associated with type 2 diabetes mellitus    7. Gastroesophageal reflux disease, unspecified whether esophagitis present    8. Controlled type 2 diabetes mellitus with stage 3 chronic kidney disease, without long-term current use of insulin        Plan:       1. Hypertension associated with diabetes  Controlled on current medications.  Continue current medications.      2. Primary malignant neoplasm of bronchus of right lower lobe  Cont current onc treatment    3. Carcinoma of right ovary  S/p resection    4. Coronary artery disease, unspecified vessel or lesion type, unspecified whether angina present, unspecified whether native or transplanted heart  Cont  card monitoring and lower risk factors    5. S/P angioplasty with stent  Cont current mgmt    6. Hyperlipidemia associated with type 2 diabetes mellitus  I recommend a heart healthy diet rich in fiber, fresh vegetables and fruit and low in saturated fats (fried foods, red meat, etc.).  I also recommend regular exercise including a minimum of 150 minutes of cardio exercise per week and 2-30 minute workouts of strength training like light weights, yoga, pilates, etc.  You should work toward a body mass index of < 25.      - Lipid Panel; Future    7. Gastroesophageal reflux disease, unspecified whether esophagitis present  Controlled on current medications.  Continue current medications.      8. Controlled type 2 diabetes mellitus with stage 3 chronic kidney disease, without long-term current use of insulin  Controlled on current medications.  Continue current medications.    - Comprehensive Metabolic Panel; Future  - Hemoglobin A1C; Future  - Microalbumin/Creatinine Ratio, Urine; Future          Time spent with patient: 20 minutes    Patient with be reevaluated in 4 months or sooner prn    Greater than 50% of this visit was spent counseling as described in above documentation:Yes

## 2024-06-24 VITALS
TEMPERATURE: 98 F | OXYGEN SATURATION: 97 % | DIASTOLIC BLOOD PRESSURE: 89 MMHG | WEIGHT: 115.31 LBS | SYSTOLIC BLOOD PRESSURE: 138 MMHG | HEIGHT: 63 IN | HEART RATE: 77 BPM | BODY MASS INDEX: 20.43 KG/M2

## 2024-06-24 PROBLEM — J96.11 CHRONIC RESPIRATORY FAILURE WITH HYPOXIA, ON HOME O2 THERAPY: Status: RESOLVED | Noted: 2024-03-20 | Resolved: 2024-06-24

## 2024-06-24 PROBLEM — Z99.81 CHRONIC RESPIRATORY FAILURE WITH HYPOXIA, ON HOME O2 THERAPY: Status: RESOLVED | Noted: 2024-03-20 | Resolved: 2024-06-24

## 2024-06-26 ENCOUNTER — LAB VISIT (OUTPATIENT)
Dept: LAB | Facility: HOSPITAL | Age: 84
End: 2024-06-26
Attending: NURSE PRACTITIONER
Payer: MEDICARE

## 2024-06-26 ENCOUNTER — TELEPHONE (OUTPATIENT)
Facility: CLINIC | Age: 84
End: 2024-06-26
Payer: MEDICARE

## 2024-06-26 ENCOUNTER — OFFICE VISIT (OUTPATIENT)
Dept: CARDIOLOGY | Facility: CLINIC | Age: 84
End: 2024-06-26
Payer: MEDICARE

## 2024-06-26 VITALS
SYSTOLIC BLOOD PRESSURE: 151 MMHG | HEART RATE: 77 BPM | BODY MASS INDEX: 19.92 KG/M2 | DIASTOLIC BLOOD PRESSURE: 72 MMHG | HEIGHT: 63 IN | WEIGHT: 112.44 LBS

## 2024-06-26 DIAGNOSIS — E78.5 HYPERLIPIDEMIA ASSOCIATED WITH TYPE 2 DIABETES MELLITUS: ICD-10-CM

## 2024-06-26 DIAGNOSIS — C34.31 MALIGNANT NEOPLASM OF LOWER LOBE OF RIGHT LUNG: ICD-10-CM

## 2024-06-26 DIAGNOSIS — C34.31 MALIGNANT NEOPLASM OF LOWER LOBE OF RIGHT LUNG: Primary | ICD-10-CM

## 2024-06-26 DIAGNOSIS — N18.30 CONTROLLED TYPE 2 DIABETES MELLITUS WITH STAGE 3 CHRONIC KIDNEY DISEASE, WITHOUT LONG-TERM CURRENT USE OF INSULIN: ICD-10-CM

## 2024-06-26 DIAGNOSIS — I48.0 PAROXYSMAL ATRIAL FIBRILLATION: Primary | ICD-10-CM

## 2024-06-26 DIAGNOSIS — I25.10 CORONARY ARTERY DISEASE, UNSPECIFIED VESSEL OR LESION TYPE, UNSPECIFIED WHETHER ANGINA PRESENT, UNSPECIFIED WHETHER NATIVE OR TRANSPLANTED HEART: ICD-10-CM

## 2024-06-26 DIAGNOSIS — D63.0 ANEMIA IN NEOPLASTIC DISEASE: ICD-10-CM

## 2024-06-26 DIAGNOSIS — E11.22 CONTROLLED TYPE 2 DIABETES MELLITUS WITH STAGE 3 CHRONIC KIDNEY DISEASE, WITHOUT LONG-TERM CURRENT USE OF INSULIN: ICD-10-CM

## 2024-06-26 DIAGNOSIS — E11.69 HYPERLIPIDEMIA ASSOCIATED WITH TYPE 2 DIABETES MELLITUS: ICD-10-CM

## 2024-06-26 DIAGNOSIS — Z95.820 S/P ANGIOPLASTY WITH STENT: ICD-10-CM

## 2024-06-26 LAB
ALBUMIN SERPL BCP-MCNC: 3.6 G/DL (ref 3.5–5.2)
ALBUMIN SERPL BCP-MCNC: 3.6 G/DL (ref 3.5–5.2)
ALBUMIN/CREAT UR: 92.3 UG/MG (ref 0–30)
ALP SERPL-CCNC: 65 U/L (ref 55–135)
ALP SERPL-CCNC: 65 U/L (ref 55–135)
ALT SERPL W/O P-5'-P-CCNC: 9 U/L (ref 10–44)
ALT SERPL W/O P-5'-P-CCNC: 9 U/L (ref 10–44)
ANION GAP SERPL CALC-SCNC: 10 MMOL/L (ref 8–16)
ANION GAP SERPL CALC-SCNC: 10 MMOL/L (ref 8–16)
AST SERPL-CCNC: 12 U/L (ref 10–40)
AST SERPL-CCNC: 12 U/L (ref 10–40)
BASOPHILS # BLD AUTO: 0.04 K/UL (ref 0–0.2)
BASOPHILS NFR BLD: 0.4 % (ref 0–1.9)
BILIRUB SERPL-MCNC: 0.3 MG/DL (ref 0.1–1)
BILIRUB SERPL-MCNC: 0.3 MG/DL (ref 0.1–1)
BUN SERPL-MCNC: 21 MG/DL (ref 8–23)
BUN SERPL-MCNC: 21 MG/DL (ref 8–23)
CALCIUM SERPL-MCNC: 9.2 MG/DL (ref 8.7–10.5)
CALCIUM SERPL-MCNC: 9.2 MG/DL (ref 8.7–10.5)
CHLORIDE SERPL-SCNC: 101 MMOL/L (ref 95–110)
CHLORIDE SERPL-SCNC: 101 MMOL/L (ref 95–110)
CHOLEST SERPL-MCNC: 220 MG/DL (ref 120–199)
CHOLEST/HDLC SERPL: 4 {RATIO} (ref 2–5)
CO2 SERPL-SCNC: 28 MMOL/L (ref 23–29)
CO2 SERPL-SCNC: 28 MMOL/L (ref 23–29)
CREAT SERPL-MCNC: 1.1 MG/DL (ref 0.5–1.4)
CREAT SERPL-MCNC: 1.1 MG/DL (ref 0.5–1.4)
CREAT UR-MCNC: 82.1 MG/DL (ref 15–325)
DIFFERENTIAL METHOD BLD: ABNORMAL
EOSINOPHIL # BLD AUTO: 0.1 K/UL (ref 0–0.5)
EOSINOPHIL NFR BLD: 0.6 % (ref 0–8)
ERYTHROCYTE [DISTWIDTH] IN BLOOD BY AUTOMATED COUNT: 14.3 % (ref 11.5–14.5)
EST. GFR  (NO RACE VARIABLE): 49.9 ML/MIN/1.73 M^2
EST. GFR  (NO RACE VARIABLE): 49.9 ML/MIN/1.73 M^2
GLUCOSE SERPL-MCNC: 127 MG/DL (ref 70–110)
GLUCOSE SERPL-MCNC: 127 MG/DL (ref 70–110)
HCT VFR BLD AUTO: 34.6 % (ref 37–48.5)
HDLC SERPL-MCNC: 55 MG/DL (ref 40–75)
HDLC SERPL: 25 % (ref 20–50)
HGB BLD-MCNC: 10.6 G/DL (ref 12–16)
IMM GRANULOCYTES # BLD AUTO: 0.03 K/UL (ref 0–0.04)
IMM GRANULOCYTES NFR BLD AUTO: 0.3 % (ref 0–0.5)
LDLC SERPL CALC-MCNC: 148 MG/DL (ref 63–159)
LYMPHOCYTES # BLD AUTO: 0.6 K/UL (ref 1–4.8)
LYMPHOCYTES NFR BLD: 6.3 % (ref 18–48)
MAGNESIUM SERPL-MCNC: 1.7 MG/DL (ref 1.6–2.6)
MCH RBC QN AUTO: 30.5 PG (ref 27–31)
MCHC RBC AUTO-ENTMCNC: 30.6 G/DL (ref 32–36)
MCV RBC AUTO: 100 FL (ref 82–98)
MICROALBUMIN UR DL<=1MG/L-MCNC: 75.8 UG/ML
MONOCYTES # BLD AUTO: 0.8 K/UL (ref 0.3–1)
MONOCYTES NFR BLD: 8.1 % (ref 4–15)
NEUTROPHILS # BLD AUTO: 8.4 K/UL (ref 1.8–7.7)
NEUTROPHILS NFR BLD: 84.3 % (ref 38–73)
NONHDLC SERPL-MCNC: 165 MG/DL
NRBC BLD-RTO: 0 /100 WBC
PLATELET # BLD AUTO: 295 K/UL (ref 150–450)
PMV BLD AUTO: 8.7 FL (ref 9.2–12.9)
POTASSIUM SERPL-SCNC: 4.1 MMOL/L (ref 3.5–5.1)
POTASSIUM SERPL-SCNC: 4.1 MMOL/L (ref 3.5–5.1)
PROT SERPL-MCNC: 7.4 G/DL (ref 6–8.4)
PROT SERPL-MCNC: 7.4 G/DL (ref 6–8.4)
RBC # BLD AUTO: 3.47 M/UL (ref 4–5.4)
SODIUM SERPL-SCNC: 139 MMOL/L (ref 136–145)
SODIUM SERPL-SCNC: 139 MMOL/L (ref 136–145)
TRIGL SERPL-MCNC: 85 MG/DL (ref 30–150)
WBC # BLD AUTO: 9.96 K/UL (ref 3.9–12.7)

## 2024-06-26 PROCEDURE — 85025 COMPLETE CBC W/AUTO DIFF WBC: CPT | Performed by: NURSE PRACTITIONER

## 2024-06-26 PROCEDURE — 1157F ADVNC CARE PLAN IN RCRD: CPT | Mod: HCNC,CPTII,S$GLB, | Performed by: INTERNAL MEDICINE

## 2024-06-26 PROCEDURE — 3288F FALL RISK ASSESSMENT DOCD: CPT | Mod: HCNC,CPTII,S$GLB, | Performed by: INTERNAL MEDICINE

## 2024-06-26 PROCEDURE — 83735 ASSAY OF MAGNESIUM: CPT | Performed by: NURSE PRACTITIONER

## 2024-06-26 PROCEDURE — 36415 COLL VENOUS BLD VENIPUNCTURE: CPT | Performed by: NURSE PRACTITIONER

## 2024-06-26 PROCEDURE — 82570 ASSAY OF URINE CREATININE: CPT | Performed by: FAMILY MEDICINE

## 2024-06-26 PROCEDURE — 3078F DIAST BP <80 MM HG: CPT | Mod: HCNC,CPTII,S$GLB, | Performed by: INTERNAL MEDICINE

## 2024-06-26 PROCEDURE — 1101F PT FALLS ASSESS-DOCD LE1/YR: CPT | Mod: HCNC,CPTII,S$GLB, | Performed by: INTERNAL MEDICINE

## 2024-06-26 PROCEDURE — 99999 PR PBB SHADOW E&M-EST. PATIENT-LVL IV: CPT | Mod: PBBFAC,HCNC,, | Performed by: INTERNAL MEDICINE

## 2024-06-26 PROCEDURE — 80053 COMPREHEN METABOLIC PANEL: CPT | Performed by: NURSE PRACTITIONER

## 2024-06-26 PROCEDURE — 80061 LIPID PANEL: CPT | Performed by: FAMILY MEDICINE

## 2024-06-26 PROCEDURE — 83036 HEMOGLOBIN GLYCOSYLATED A1C: CPT | Performed by: FAMILY MEDICINE

## 2024-06-26 PROCEDURE — 82043 UR ALBUMIN QUANTITATIVE: CPT | Performed by: FAMILY MEDICINE

## 2024-06-26 PROCEDURE — 1159F MED LIST DOCD IN RCRD: CPT | Mod: HCNC,CPTII,S$GLB, | Performed by: INTERNAL MEDICINE

## 2024-06-26 PROCEDURE — 1126F AMNT PAIN NOTED NONE PRSNT: CPT | Mod: HCNC,CPTII,S$GLB, | Performed by: INTERNAL MEDICINE

## 2024-06-26 PROCEDURE — 99214 OFFICE O/P EST MOD 30 MIN: CPT | Mod: HCNC,S$GLB,, | Performed by: INTERNAL MEDICINE

## 2024-06-26 PROCEDURE — 3077F SYST BP >= 140 MM HG: CPT | Mod: HCNC,CPTII,S$GLB, | Performed by: INTERNAL MEDICINE

## 2024-06-26 NOTE — PROGRESS NOTES
Subjective:    Patient ID:  Alexa Otero is a 84 y.o. female patient here for evaluation Hypertension, Coronary Artery Disease, Atrial Fibrillation, and Hyperlipidemia    6/26/24:  Still on chemo.  Compliant with aspirin Plavix.  Reports easy bruising of skin.  Denies palpitations    History of Present Illness:     84-year-old female came here for establishment of care.  This patient is new to me.  She was following up with Dr. Julio in [place].  She wants to establish care in [place] from now on.  Patient with past medical history of diabetes, hypertension, hyperlipidemia, lung cancer status post right lower lobectomy.  Patient stated s\he was evaluated by Cardiology for preop for lung cancer surgery.  She had an angiogram and required 3 stents in September/October 2023.  Records from Dr. Hendrix office reviewed.  Normal EF on echo. After the stent she had VFib arrest in recovery presumably secondary to hypomagnesemia and she is currently on supplementation.  She was also evaluated by Cardiology in [facility] for paroxysmal atrial fibrillation.  She was prescribed Eliquis at the time however currently she is not taking Eliquis.  She is on aspirin and Plavix and takes medications regularly.  She is on home oxygen since the surgery.  She has 1 more chemotherapy session pending next month.  She is following up with Hematology/Oncology and pulmonology.  She denies any anginal symptoms currently.  She has occasional dependent ankle edema.      Review of patient's allergies indicates:  No Known Allergies    Past Medical History:   Diagnosis Date    Arthritis     Cardiac arrest 10/2023    Cataract     Chronic obstructive pulmonary disease, unspecified COPD type 3/20/2024    Colon polyp     Coronary artery disease 3/20/2024    Diabetes mellitus type II 2012    Encounter for blood transfusion     Extra-ovarian endometrioid adenocarcinoma 2020    GERD (gastroesophageal reflux disease)     History of  chronic cough     clear productive    Hyperlipidemia     Hypertension     Macular degeneration     Ovarian cancer     PONV (postoperative nausea and vomiting)     Squamous cell carcinoma 1980's    precancer of cervix     Past Surgical History:   Procedure Laterality Date    AUGMENTATION OF BREAST      BRONCHOSCOPY N/A 12/12/2023    Procedure: BRONCHOSCOPY;  Surgeon: Neva Christian MD;  Location: [facility];  Service: ENT;  Laterality: N/A;    BRONCHOSCOPY WITH FLUOROSCOPY Right 05/11/2023    Procedure: BRONCHOSCOPY, WITH FLUOROSCOPY;  Surgeon: Neva Christian MD;  Location: [facility] ENDO;  Service: Pulmonary;  Laterality: Right;    BRONCHOSCOPY WITH FLUOROSCOPY N/A 12/15/2023    Procedure: BRONCHOSCOPY, WITH FLUOROSCOPY;  Surgeon: Neva Christian MD;  Location: [facility] ENDO;  Service: Pulmonary;  Laterality: N/A;    CATARACT EXTRACTION BILATERAL W/ ANTERIOR VITRECTOMY Bilateral     CHOLECYSTECTOMY      COLONOSCOPY      COLONOSCOPY N/A 04/20/2023    Procedure: COLONOSCOPY;  Surgeon: Sabino Stephenson MD;  Location: [facility];  Service: Endoscopy;  Laterality: N/A;    CORONARY STENT PLACEMENT  10/2023    x 3    ESOPHAGOGASTRODUODENOSCOPY N/A 06/20/2018    Procedure: EGD (ESOPHAGOGASTRODUODENOSCOPY);  Surgeon: Sabino Stephenson MD;  Location: [facility];  Service: Endoscopy;  Laterality: N/A;    ESOPHAGOGASTRODUODENOSCOPY N/A 03/31/2023    Procedure: EGD (ESOPHAGOGASTRODUODENOSCOPY);  Surgeon: Sabino Stephenson MD;  Location: [facility];  Service: Endoscopy;  Laterality: N/A;    ESOPHAGOGASTRODUODENOSCOPY N/A 12/11/2023    Procedure: EGD (ESOPHAGOGASTRODUODENOSCOPY);  Surgeon: Pretty Salgado MD;  Location: [facility];  Service: Endoscopy;  Laterality: N/A;    HYSTERECTOMY  1976    partial    INJECTION OF ANESTHETIC AGENT AROUND MEDIAL BRANCH NERVES INNERVATING LUMBAR FACET JOINT Right 05/10/2023    Procedure: Block-nerve-Lateral-branch-lumbar;  Surgeon: Agustin Robles MD;  [facility];  Service: Pain Management;   Laterality: Right;  L5 and s1,s2 LBB    INJECTION OF ANESTHETIC AGENT AROUND MEDIAL BRANCH NERVES INNERVATING LUMBAR FACET JOINT Right 05/30/2023    Procedure: Block-nerve-medial branch-lumbar;  Surgeon: Agustin Robles MD;  Location: [facility] OR;  Service: Pain Management;  Laterality: Right;  L5, s1 ,s2 LBB #2    INJECTION OF ANESTHETIC AGENT AROUND NERVE Right 10/31/2023    Procedure: INTERCOSTAL NERVE BLOCK;  Surgeon: Washington Shankar MD;  Location: [facility] OR;  Service: Cardiothoracic;  Laterality: Right;    INJECTION, SACROILIAC JOINT Right 01/26/2023    Procedure: INJECTION,SACROILIAC JOINT;  Surgeon: Agustin Robles MD;  Location: [facility] OR;  Service: Pain Management;  Laterality: Right;    INSERTION OF TUNNELED CENTRAL VENOUS CATHETER (CVC) WITH SUBCUTANEOUS PORT Right 10/19/2020    Procedure: INSERTION, PORT-A-CATH;  Surgeon: Misti Mcfarland MD;  Location: [facility] OR;  Service: General;  Laterality: Right;    INTRAMEDULLARY RODDING OF TROCHANTER OF FEMUR Right 11/28/2021    Procedure: INSERTION, INTRAMEDULLARY LUC, FEMUR, TROCHANTER/RIGHT TFN DR FAJARDO NOTIFIED REP;  Surgeon: Kp Fajardo MD;  Location: [facility] OR;  Service: Orthopedics;  Laterality: Right;  SKIP    ROBOT-ASSISTED LAPAROSCOPIC LYMPHADENECTOMY USING DA NELLA XI N/A 09/21/2020    Procedure: XI ROBOTIC LYMPHADENECTOMY-pelvic and kell-aortic;  Surgeon: Altagracia Ray MD;  Location: [facility] OR;  Service: OB/GYN;  Laterality: N/A;    ROBOT-ASSISTED LAPAROSCOPIC OMENTECTOMY USING DA NELLA XI N/A 09/21/2020    Procedure: XI ROBOTIC OMENTECTOMY;  Surgeon: Altagracia Ray MD;  Location: [facility] OR;  Service: OB/GYN;  Laterality: N/A;    ROBOT-ASSISTED LAPAROSCOPIC SALPINGO-OOPHORECTOMY USING DA NELLA XI Bilateral 09/21/2020    Procedure: XI ROBOTIC SALPINGO-OOPHORECTOMY;  Surgeon: Altagracia Ray MD;  Location: [facility] OR;  Service: OB/GYN;  Laterality: Bilateral;    THORACOSCOPIC BIOPSY OF PLEURA Right 10/31/2023    Procedure: VATS, WITH PLEURA BIOPSY;   Surgeon: Washington Shankar MD;  Location: [facility];  Service: Cardiothoracic;  Laterality: Right;  FROZEN SECTION   **KRISTEN-ASSISDT**    THORACOTOMY Right 10/31/2023    Procedure: THORACOTOMY;  Surgeon: [place] Shankar MD;  Location: [facility];  Service: Cardiothoracic;  Laterality: Right;    UPPER GASTROINTESTINAL ENDOSCOPY       Social History     Tobacco Use    Smoking status: Former     Current packs/day: 0.00     Average packs/day: 1 pack/day for 20.0 years (20.0 ttl pk-yrs)     Types: Cigarettes     Start date:      Quit date:      Years since quittin.5    Smokeless tobacco: Never    Tobacco comments:     quit 40 yrs ago   Substance Use Topics    Alcohol use: Yes     Alcohol/week: 1.0 standard drink of alcohol     Types: 1 Glasses of wine per week     Comment: occasional    Drug use: No        Review of Systems   Negative except as mentioned in HPI         Objective        Vitals:    24 1105   BP: (!) 151/72   Pulse: 77       LIPIDS - LAST 2   Lab Results   Component Value Date    CHOL 200 (H) 2023    CHOL 169 2023    HDL 44 2023    HDL 57 2023    LDLCALC 137.2 2023    LDLCALC 98.2 2023    TRIG 94 2023    TRIG 69 2023    CHOLHDL 22.0 2023    CHOLHDL 33.7 2023       CBC - LAST 2  Lab Results   Component Value Date    WBC 9.96 2024    WBC 9.04 2024    RBC 3.47 (L) 2024    RBC 3.35 (L) 2024    HGB 10.6 (L) 2024    HGB 10.3 (L) 2024    HCT 34.6 (L) 2024    HCT 34.0 (L) 2024     (H) 2024     (H) 2024    MCH 30.5 2024    MCH 30.7 2024    MCHC 30.6 (L) 2024    MCHC 30.3 (L) 2024    RDW 14.3 2024    RDW 14.6 (H) 2024     2024     2024    MPV 8.7 (L) 2024    MPV 8.9 (L) 2024    GRAN 8.4 (H) 2024    GRAN 84.3 (H) 2024    LYMPH 0.6 (L) 2024    LYMPH 6.3 (L) 2024    MONO 0.8  06/26/2024    MONO 8.1 06/26/2024    BASO 0.04 06/26/2024    BASO 0.05 06/19/2024    NRBC 0 06/26/2024    NRBC 0 06/19/2024       CHEMISTRY & LIVER FUNCTION - LAST 2  Lab Results   Component Value Date     06/19/2024     06/12/2024    K 3.9 06/19/2024    K 3.9 06/12/2024     06/19/2024     06/12/2024    CO2 30 (H) 06/19/2024    CO2 28 06/12/2024    ANIONGAP 7 (L) 06/19/2024    ANIONGAP 9 06/12/2024    BUN 25 (H) 06/19/2024    BUN 23 06/12/2024    CREATININE 1.1 06/19/2024    CREATININE 1.2 06/12/2024     (H) 06/19/2024     (H) 06/12/2024    CALCIUM 9.2 06/19/2024    CALCIUM 9.1 06/12/2024    PH 7.335 (L) 10/31/2023    PH 7.412 10/31/2023    MG 1.8 06/19/2024    MG 1.9 06/12/2024    ALBUMIN 3.5 06/19/2024    ALBUMIN 3.5 06/12/2024    PROT 7.0 06/19/2024    PROT 7.2 06/12/2024    ALKPHOS 59 06/19/2024    ALKPHOS 63 06/12/2024    ALT 8 (L) 06/19/2024    ALT 9 (L) 06/12/2024    AST 11 06/19/2024    AST 13 06/12/2024    BILITOT 0.3 06/19/2024    BILITOT 0.4 06/12/2024        CARDIAC PROFILE - LAST 2  Lab Results   Component Value Date     (H) 12/10/2023     (H) 12/03/2023    CPK 35 03/18/2023     12/05/2023    TROPONINI 0.149 (HH) 10/13/2023    TROPONINI 0.033 10/13/2023    TROPONINIHS 9.7 12/10/2023    TROPONINIHS 14.4 12/04/2023        COAGULATION - LAST 2  Lab Results   Component Value Date    LABPT 14.4 10/15/2023    LABPT 14.0 10/14/2023    INR 1.3 (H) 12/03/2023    INR 0.9 10/27/2023    APTT 27.2 10/27/2023    APTT 180.9 (HH) 10/13/2023       ENDOCRINE & PSA - LAST 2  Lab Results   Component Value Date    HGBA1C 5.3 11/28/2023    HGBA1C 5.8 (H) 10/13/2023    TSH 0.772 12/03/2023    TSH 0.637 11/05/2023    PROCAL 0.362 12/10/2023    PROCAL 1.42 (H) 03/18/2023        ECHOCARDIOGRAM RESULTS  Results for orders placed during the hospital encounter of 10/13/23    Echo    Interpretation Summary    Left Ventricle: The left ventricle is normal in size. Normal wall  thickness. Normal wall motion. There is normal systolic function with a visually estimated ejection fraction of 55 - 60%. Grade I diastolic dysfunction.    Right Ventricle: Normal right ventricular cavity size. Wall thickness is normal. Right ventricle wall motion  is normal. Systolic function is normal.    Aortic Valve: There is moderate aortic valve sclerosis.    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 There is mild mitral annular calcification present. There is no stenosis. The mean pressure gradient across the mitral valve is 3 mmHg at a heart rate of  bpm. There is mild regurgitation.    Tricuspid Valve: There is mild regurgitation.  No pulmonary hypertension.    IVC/SVC: Normal venous pressure at 3 mmHg.      CURRENT/PREVIOUS VISIT EKG  Results for orders placed or performed in visit on 03/20/24   IN OFFICE EKG 12-LEAD (to [employer])    Collection Time: 03/20/24  9:25 AM   Result Value Ref Range    QRS Duration 68 ms    [facility] QTC Calculation 437 ms    Narrative    Test Reason : Z95.820,    Vent. Rate : 085 BPM     Atrial Rate : 085 BPM     P-R Int : 132 ms          QRS Dur : 068 ms      QT Int : 368 ms       P-R-T Axes : 055 020 062 degrees     QTc Int : 437 ms    Normal sinus rhythm  Septal infarct (cited on or before 13-OCT-2023)  Abnormal ECG  When compared with ECG of 10-DEC-2023 10:23,  Premature supraventricular complexes are no longer Present  Confirmed by Luis Eduardo LAWSON, Narciso SAMANIEGO ([id number]) on 4/30/2024 9:25:56 PM    Referred By: YOSSI ISLAS           Confirmed By:Narciso Hoff MD     No valid procedures specified.   No results found for this or any previous visit.    No valid procedures specified.          PREVIOUS STRESS TEST              PREVIOUS ANGIOGRAM        PHYSICAL EXAM    CONSTITUTIONAL: Well built, well nourished in no apparent distress  NECK: no JVD  LUNGS: CTA b/l  HEART: regular rate and rhythm, S1, S2 normal, no murmur   ABDOMEN:  Distended  EXTREMITIES: No edema  NEURO: AAO  X 3   Psych:  Normal affect    I HAVE REVIEWED :    The vital signs, nurses notes, and all the pertinent radiology and labs.        Current Outpatient Medications   Medication Instructions    amiodarone (PACERONE) 200 mg, Oral, 2 times daily    aspirin (ECOTRIN) 81 mg, Oral, Daily    benazepriL (LOTENSIN) 20 mg, Oral, Daily    clopidogreL (PLAVIX) 75 mg, Oral, Daily    folic acid (FOLVITE) 1 mg, Oral, Daily    gabapentin (NEURONTIN) 100 mg, Oral, 2 times daily    HYDROcodone-acetaminophen (NORCO) 5-325 mg per tablet 1 tablet, Oral, Every 6 hours PRN    LORazepam (ATIVAN) 1 mg, Oral, Every 6 hours PRN    magnesium oxide (MAG-OX) 400 mg, Oral, Daily, Patient requested refill    melatonin 5 mg Chew 1 tablet, Oral, Nightly PRN    metoprolol succinate (TOPROL-XL) 25 mg, Oral, Daily    omeprazole (PRILOSEC) 40 MG capsule Take 1 capsule by mouth daily.    ondansetron (ZOFRAN) 8 mg, Oral, Every 8 hours PRN    potassium chloride (K-TAB) 20 mEq 1 tablet, Oral, Daily    potassium chloride SA (K-DUR,KLOR-CON) 20 MEQ tablet Take 1 tablet daily    promethazine (PHENERGAN) 25 mg, Oral, Every 4 hours PRN    VIT A/VIT C/VIT E/ZINC/COPPER (ICAPS AREDS ORAL) 1 capsule, Oral, 2 times daily        Assessment & Plan     History of CAD status post PCI of LAD/circumflex/RCA in September/October 2023- denies angina  VFib after PCI- presumably from hypomagnesemia  Lung cancer status post right lower lobectomy, currently on chemo  Hypertension  Hyperlipidemia- on simvastatin  Paroxysmal atrial fibrillation-currently not on anticoagulation.  Denies palpitations      Plan:  Not on anticoagulation possibly due to thrombocytopenia from chemotherapy.  platelets better however still on chemo.  Advised to continue aspirin, Plavix for now.   2 week event monitor to evaluate any recurrence of Afib  Referred to EP regarding long-term anticoagulation.   Continue potassium and magnesium supplementation.  Continue benazepril, metoprolol, simvastatin,  amiodarone  Home BP monitoring.      Follow up in about 3 months (around 9/26/2024).

## 2024-06-27 ENCOUNTER — HOSPITAL ENCOUNTER (OUTPATIENT)
Dept: CARDIOLOGY | Facility: CLINIC | Age: 84
Discharge: HOME | End: 2024-06-27
Attending: INTERNAL MEDICINE
Payer: MEDICARE

## 2024-06-27 ENCOUNTER — TELEPHONE (OUTPATIENT)
Facility: CLINIC | Age: 84
End: 2024-06-27
Payer: MEDICARE

## 2024-06-27 DIAGNOSIS — I48.0 PAROXYSMAL ATRIAL FIBRILLATION: ICD-10-CM

## 2024-06-27 DIAGNOSIS — D63.0 ANEMIA IN NEOPLASTIC DISEASE: ICD-10-CM

## 2024-06-27 DIAGNOSIS — C34.31 MALIGNANT NEOPLASM OF LOWER LOBE OF RIGHT LUNG: Primary | ICD-10-CM

## 2024-06-27 LAB
ESTIMATED AVG GLUCOSE: 111 MG/DL (ref 68–131)
HBA1C MFR BLD: 5.5 % (ref 4.5–6.2)

## 2024-07-01 ENCOUNTER — PATIENT MESSAGE (OUTPATIENT)
Facility: CLINIC | Age: 84
End: 2024-07-01
Payer: MEDICARE

## 2024-07-08 ENCOUNTER — INFUSION (OUTPATIENT)
Dept: INFUSION THERAPY | Facility: HOSPITAL | Age: 84
End: 2024-07-08
Attending: INTERNAL MEDICINE
Payer: MEDICARE

## 2024-07-08 VITALS
TEMPERATURE: 98 F | WEIGHT: 115.19 LBS | DIASTOLIC BLOOD PRESSURE: 68 MMHG | SYSTOLIC BLOOD PRESSURE: 188 MMHG | OXYGEN SATURATION: 99 % | BODY MASS INDEX: 20.41 KG/M2 | RESPIRATION RATE: 18 BRPM | HEIGHT: 63 IN | HEART RATE: 67 BPM

## 2024-07-08 DIAGNOSIS — C34.31 MALIGNANT NEOPLASM OF LOWER LOBE OF RIGHT LUNG: ICD-10-CM

## 2024-07-08 DIAGNOSIS — D63.0 ANEMIA IN NEOPLASTIC DISEASE: ICD-10-CM

## 2024-07-08 DIAGNOSIS — C34.31 PRIMARY MALIGNANT NEOPLASM OF BRONCHUS OF RIGHT LOWER LOBE: Primary | ICD-10-CM

## 2024-07-08 LAB
ALBUMIN SERPL BCP-MCNC: 3.2 G/DL (ref 3.5–5.2)
ALP SERPL-CCNC: 56 U/L (ref 55–135)
ALT SERPL W/O P-5'-P-CCNC: 9 U/L (ref 10–44)
ANION GAP SERPL CALC-SCNC: 8 MMOL/L (ref 8–16)
AST SERPL-CCNC: 11 U/L (ref 10–40)
BASOPHILS # BLD AUTO: 0.02 K/UL (ref 0–0.2)
BASOPHILS NFR BLD: 0.3 % (ref 0–1.9)
BILIRUB SERPL-MCNC: 0.2 MG/DL (ref 0.1–1)
BUN SERPL-MCNC: 23 MG/DL (ref 8–23)
CALCIUM SERPL-MCNC: 8.4 MG/DL (ref 8.7–10.5)
CHLORIDE SERPL-SCNC: 104 MMOL/L (ref 95–110)
CO2 SERPL-SCNC: 28 MMOL/L (ref 23–29)
CREAT SERPL-MCNC: 1 MG/DL (ref 0.5–1.4)
DIFFERENTIAL METHOD BLD: ABNORMAL
EOSINOPHIL # BLD AUTO: 0.1 K/UL (ref 0–0.5)
EOSINOPHIL NFR BLD: 0.7 % (ref 0–8)
ERYTHROCYTE [DISTWIDTH] IN BLOOD BY AUTOMATED COUNT: 14.2 % (ref 11.5–14.5)
EST. GFR  (NO RACE VARIABLE): 55.9 ML/MIN/1.73 M^2
GLUCOSE SERPL-MCNC: 152 MG/DL (ref 70–110)
HCT VFR BLD AUTO: 30.5 % (ref 37–48.5)
HGB BLD-MCNC: 9.5 G/DL (ref 12–16)
IMM GRANULOCYTES # BLD AUTO: 0.04 K/UL (ref 0–0.04)
IMM GRANULOCYTES NFR BLD AUTO: 0.5 % (ref 0–0.5)
LYMPHOCYTES # BLD AUTO: 0.7 K/UL (ref 1–4.8)
LYMPHOCYTES NFR BLD: 9.4 % (ref 18–48)
MAGNESIUM SERPL-MCNC: 1.7 MG/DL (ref 1.6–2.6)
MCH RBC QN AUTO: 30.1 PG (ref 27–31)
MCHC RBC AUTO-ENTMCNC: 31.1 G/DL (ref 32–36)
MCV RBC AUTO: 97 FL (ref 82–98)
MONOCYTES # BLD AUTO: 0.7 K/UL (ref 0.3–1)
MONOCYTES NFR BLD: 9.1 % (ref 4–15)
NEUTROPHILS # BLD AUTO: 6.1 K/UL (ref 1.8–7.7)
NEUTROPHILS NFR BLD: 80 % (ref 38–73)
NRBC BLD-RTO: 0 /100 WBC
PLATELET # BLD AUTO: 289 K/UL (ref 150–450)
PMV BLD AUTO: 8.7 FL (ref 9.2–12.9)
POTASSIUM SERPL-SCNC: 3.8 MMOL/L (ref 3.5–5.1)
PROT SERPL-MCNC: 6.3 G/DL (ref 6–8.4)
RBC # BLD AUTO: 3.16 M/UL (ref 4–5.4)
SODIUM SERPL-SCNC: 140 MMOL/L (ref 136–145)
WBC # BLD AUTO: 7.67 K/UL (ref 3.9–12.7)

## 2024-07-08 PROCEDURE — 83735 ASSAY OF MAGNESIUM: CPT | Performed by: NURSE PRACTITIONER

## 2024-07-08 PROCEDURE — A4216 STERILE WATER/SALINE, 10 ML: HCPCS | Performed by: INTERNAL MEDICINE

## 2024-07-08 PROCEDURE — 85025 COMPLETE CBC W/AUTO DIFF WBC: CPT | Performed by: NURSE PRACTITIONER

## 2024-07-08 PROCEDURE — 96409 CHEMO IV PUSH SNGL DRUG: CPT

## 2024-07-08 PROCEDURE — 96372 THER/PROPH/DIAG INJ SC/IM: CPT | Mod: 59

## 2024-07-08 PROCEDURE — 96369 SC THER INFUSION UP TO 1 HR: CPT

## 2024-07-08 PROCEDURE — 63600175 PHARM REV CODE 636 W HCPCS: Mod: JZ,JG | Performed by: INTERNAL MEDICINE

## 2024-07-08 PROCEDURE — 96375 TX/PRO/DX INJ NEW DRUG ADDON: CPT

## 2024-07-08 PROCEDURE — 96367 TX/PROPH/DG ADDL SEQ IV INF: CPT

## 2024-07-08 PROCEDURE — 80053 COMPREHEN METABOLIC PANEL: CPT | Performed by: NURSE PRACTITIONER

## 2024-07-08 PROCEDURE — 25000003 PHARM REV CODE 250: Performed by: INTERNAL MEDICINE

## 2024-07-08 RX ORDER — CYANOCOBALAMIN 1000 UG/ML
1000 INJECTION, SOLUTION INTRAMUSCULAR; SUBCUTANEOUS
Status: COMPLETED | OUTPATIENT
Start: 2024-07-08 | End: 2024-07-08

## 2024-07-08 RX ORDER — HEPARIN 100 UNIT/ML
500 SYRINGE INTRAVENOUS
Status: DISCONTINUED | OUTPATIENT
Start: 2024-07-08 | End: 2024-07-08 | Stop reason: HOSPADM

## 2024-07-08 RX ORDER — SODIUM CHLORIDE 0.9 % (FLUSH) 0.9 %
10 SYRINGE (ML) INJECTION
Status: DISCONTINUED | OUTPATIENT
Start: 2024-07-08 | End: 2024-07-08 | Stop reason: HOSPADM

## 2024-07-08 RX ADMIN — SODIUM CHLORIDE: 9 INJECTION, SOLUTION INTRAVENOUS at 02:07

## 2024-07-08 RX ADMIN — HEPARIN 500 UNITS: 100 SYRINGE at 03:07

## 2024-07-08 RX ADMIN — Medication 10 ML: at 03:07

## 2024-07-08 RX ADMIN — PALONOSETRON HYDROCHLORIDE 0.25 MG: 0.25 INJECTION INTRAVENOUS at 02:07

## 2024-07-08 RX ADMIN — CYANOCOBALAMIN 1000 MCG: 1000 INJECTION, SOLUTION INTRAMUSCULAR at 02:07

## 2024-07-08 RX ADMIN — APREPITANT 130 MG: 130 INJECTION, EMULSION INTRAVENOUS at 02:07

## 2024-07-08 RX ADMIN — PEMETREXED DISODIUM 575 MG: 500 INJECTION, POWDER, LYOPHILIZED, FOR SOLUTION INTRAVENOUS at 03:07

## 2024-07-08 NOTE — PLAN OF CARE
Problem: Fall Injury Risk  Goal: Absence of Fall and Fall-Related Injury  Outcome: Progressing  Intervention: Identify and Manage Contributors  Flowsheets (Taken 7/8/2024 1400)  Self-Care Promotion: independence encouraged  Medication Review/Management: medications reviewed  Intervention: Promote Injury-Free Environment  Flowsheets (Taken 7/8/2024 1400)  Safety Promotion/Fall Prevention: medications reviewed

## 2024-07-10 DIAGNOSIS — G47.00 INSOMNIA, UNSPECIFIED TYPE: ICD-10-CM

## 2024-07-11 ENCOUNTER — OFFICE VISIT (OUTPATIENT)
Facility: CLINIC | Age: 84
End: 2024-07-11
Payer: MEDICARE

## 2024-07-11 VITALS
HEART RATE: 69 BPM | TEMPERATURE: 98 F | DIASTOLIC BLOOD PRESSURE: 70 MMHG | BODY MASS INDEX: 20.34 KG/M2 | HEIGHT: 63 IN | WEIGHT: 114.81 LBS | RESPIRATION RATE: 16 BRPM | SYSTOLIC BLOOD PRESSURE: 160 MMHG

## 2024-07-11 DIAGNOSIS — D50.0 IRON DEFICIENCY ANEMIA DUE TO CHRONIC BLOOD LOSS: ICD-10-CM

## 2024-07-11 DIAGNOSIS — C34.31 PRIMARY MALIGNANT NEOPLASM OF BRONCHUS OF RIGHT LOWER LOBE: Primary | ICD-10-CM

## 2024-07-11 DIAGNOSIS — J44.9 CHRONIC OBSTRUCTIVE PULMONARY DISEASE, UNSPECIFIED COPD TYPE: ICD-10-CM

## 2024-07-11 PROCEDURE — 1160F RVW MEDS BY RX/DR IN RCRD: CPT | Mod: HCNC,CPTII,S$GLB, | Performed by: INTERNAL MEDICINE

## 2024-07-11 PROCEDURE — 1101F PT FALLS ASSESS-DOCD LE1/YR: CPT | Mod: HCNC,CPTII,S$GLB, | Performed by: INTERNAL MEDICINE

## 2024-07-11 PROCEDURE — 3077F SYST BP >= 140 MM HG: CPT | Mod: HCNC,CPTII,S$GLB, | Performed by: INTERNAL MEDICINE

## 2024-07-11 PROCEDURE — 3288F FALL RISK ASSESSMENT DOCD: CPT | Mod: HCNC,CPTII,S$GLB, | Performed by: INTERNAL MEDICINE

## 2024-07-11 PROCEDURE — 3078F DIAST BP <80 MM HG: CPT | Mod: HCNC,CPTII,S$GLB, | Performed by: INTERNAL MEDICINE

## 2024-07-11 PROCEDURE — G2211 COMPLEX E/M VISIT ADD ON: HCPCS | Mod: HCNC,S$GLB,, | Performed by: INTERNAL MEDICINE

## 2024-07-11 PROCEDURE — 1159F MED LIST DOCD IN RCRD: CPT | Mod: HCNC,CPTII,S$GLB, | Performed by: INTERNAL MEDICINE

## 2024-07-11 PROCEDURE — 1126F AMNT PAIN NOTED NONE PRSNT: CPT | Mod: HCNC,CPTII,S$GLB, | Performed by: INTERNAL MEDICINE

## 2024-07-11 PROCEDURE — 99999 PR PBB SHADOW E&M-EST. PATIENT-LVL IV: CPT | Mod: PBBFAC,HCNC,, | Performed by: INTERNAL MEDICINE

## 2024-07-11 PROCEDURE — 99215 OFFICE O/P EST HI 40 MIN: CPT | Mod: HCNC,S$GLB,, | Performed by: INTERNAL MEDICINE

## 2024-07-11 PROCEDURE — 1157F ADVNC CARE PLAN IN RCRD: CPT | Mod: HCNC,CPTII,S$GLB, | Performed by: INTERNAL MEDICINE

## 2024-07-11 RX ORDER — LORAZEPAM 1 MG/1
1 TABLET ORAL EVERY 6 HOURS PRN
Qty: 30 TABLET | Refills: 2 | Status: SHIPPED | OUTPATIENT
Start: 2024-07-11 | End: 2024-08-10

## 2024-07-11 NOTE — PROGRESS NOTES
PROGRESS NOTE    Subjective:       Patient ID: Alexa Otero is a 84 y.o. female.    9/21/2020  Bilateral oophorectomy  Right endometrioid Carcinoma  [id number]  S/p Carbo/Taxol x 6    8/15/2023 PET:  IMPRESSION:  1. Multifocal right lower lobe and right pleural FDG hypermetabolism as described, with associated right lower lobe consolidation. These are nonspecific and could be of infectious or inflammatory etiology in the clinical setting of chronic pneumonia, and or metastatic disease. Correlation with previous bronchoscopy results is needed.  2. Multiple new non hypermetabolic pulmonary nodules in both lungs, which are generally below size resolution for PET, but suspicious for pulmonary metastases.  3. No additional findings of FDG avid metastatic disease.    10/31/2023 RLL Lobectomy:  LUNG SYNOPTIC REPORT   PROCEDURE:     Lobectomy.   SPECIMEN LATERALITY:    Right   TUMOR SITE:     Lower lobe.   TUMOR SIZE:     Cannot be determined, 4.5 cm area of cavitation but    tumor appears to involve most if not all of the resected lobe.   TUMOR FOCALITY:    Single tumor.   HISTOLOGIC TYPE:    Invasive mucinous adenocarcinoma with pneumonic    pattern.   VISCEROPLEURAL INVASION:   Present   LYMPHOVASCULAR INVASION:   Not identified.   SPREAD OF TUMOR THROUGH AIR SPACES:  Present   DIRECT INVASION OF ADJACENT STRUCTURES: No adjacent structures present.   MARGINS:     All margins are uninvolved by tumor, margins examined:         Bronchial, distance of invasion of tumor from closest         margin: cannot be determined.   TREATMENT EFFECT:    No known presurgical therapy.   ADDITIONAL PATHOLOGIC FINDINGS:  Organized pulmonary emboli, pulmonary    infarct.   REGIONAL LYMPH NODES:    NUMBER OF LYMPH NODES INVOLVED:  Zero, number of lymph nodes examined:     1, yue         structures examined: 10 (hilar)   PATHOLOGIC STAGE CLASSIFICATION    OF PRIMARY TUMOR:    pT3     REGIONAL LYMPH NODES:   pN0     Comment: The sections show invasive mucinous adenocarcinoma with    findings that suggest so-called pneumonic pattern. there is diffuse    involvement with tumor present in all the histologic sections, often    with a lepidic pattern suggesting spread through the air spaces. The    reported CT findings of a consolidated appearance correlates with the    histologic findings and the reported clinical presentation of a    year-long cough productive of purulent mucus. Mucus is also a common    presentation of this somewhat uncommon pulmonary malignancy. These    tumors are two complete stages pT3 when there appears to be involvement    of the entire lobe; this appears to be appropriate in this case. There    are also organizing/organized pulmonary emboli within the vasculature,    possibly indicating hypercoagulability of malignancy as can be seen    especially with mucinous tumors. The tissue sampled in block 2F is    likely an infarct related to this. The history of endometrioid    adenocarcinoma is noted, but the current tumor represents a primary    lung malignancy and is unrelated to the prior cancer.     10/13/2023-Echo:  Normal systolic function with EF 55-60%  Grade I diastolic dysfunction    12/10/2023-CT CAP:  Progression of the alveolar consolidation in the right mid and lower lung with moderate size right pleural effusion. There is progression of the airspace disease within the right upper lobe with patchy groundglass opacity left upper lobe and left lung base. Findings are compatible with multifocal.     12/15/2023-Bronchoscopy:  LUNG, RIGHT MIDDLE LOBE, BIOPSY:   - ATYPICAL ADENOMATOUS HYPERPLASIA.   - IN A BACKGROUND OF REACTIVE FIBROSIS AND FIBRIN.     Comment:   Given the patient's history, this lesion is concerning for well    differentiated lepidic type adenocarcinoma but is quantitatively    insufficient (approximately 1 mm) for a definite diagnosis.     Multiple  additional leveled sections were examined.     Carbo/Pemetrexed x 4  1/25/2024-4/11/2024    Maintenance Pemetrexed:  Cycle 5: 7/8/2024 4/23/2024-PET:  1. Interval improvement in hypermetabolic right lung airspace opacities, presumably reflecting infectious or inflammatory etiology.  2. No convincing evidence of recurrent neoplasm or metastatic disease.  3. Interval increased size of right pleural effusion.  4. Additional stable observations as described.      Chief Complaint:  No chief complaint on file.  Stage IV Lung cancer follow up    History of Present Illness:   Alexa Otero is a 84 y.o. female who presents for follow up of lung cancer.      Patient started on maintenance pem since last visit. .     Family and Social history reviewed and is unchanged from 12/1/2023             Current Outpatient Medications:     amiodarone (PACERONE) 200 MG Tab, Take 1 tablet (200 mg total) by mouth 2 (two) times daily., Disp: 60 tablet, Rfl: 11    aspirin (ECOTRIN) 81 MG EC tablet, Take 81 mg by mouth once daily., Disp: , Rfl:     benazepriL (LOTENSIN) 40 MG tablet, Take 0.5 tablets (20 mg total) by mouth once daily., Disp: 45 tablet, Rfl: 3    clopidogreL (PLAVIX) 75 mg tablet, Take 1 tablet (75 mg total) by mouth once daily., Disp: 90 tablet, Rfl: 2    folic acid (FOLVITE) 1 MG tablet, Take 1 tablet (1 mg total) by mouth once daily., Disp: 100 tablet, Rfl: 3    gabapentin (NEURONTIN) 100 MG capsule, Take 1 capsule (100 mg total) by mouth 2 (two) times daily., Disp: 180 capsule, Rfl: 3    LORazepam (ATIVAN) 1 MG tablet, Take 1 tablet (1 mg total) by mouth every 6 (six) hours as needed for Anxiety (insomnia)., Disp: 30 tablet, Rfl: 2    magnesium oxide (MAG-OX) 400 mg (241.3 mg magnesium) tablet, Take 1 tablet (400 mg total) by mouth once daily. Patient requested refill, Disp: 90 tablet, Rfl: 3    melatonin 5 mg Chew, Take 1 tablet by mouth nightly as needed (insomnia)., Disp: , Rfl:     metoprolol succinate (TOPROL-XL)  "25 MG 24 hr tablet, Take 1 tablet (25 mg total) by mouth once daily., Disp: 90 tablet, Rfl: 3    ondansetron (ZOFRAN) 8 MG tablet, Take 1 tablet (8 mg total) by mouth every 8 (eight) hours as needed., Disp: 30 tablet, Rfl: 5    potassium chloride (K-TAB) 20 mEq, Take 1 tablet by mouth once daily., Disp: , Rfl:     potassium chloride SA (K-DUR,KLOR-CON) 20 MEQ tablet, Take 1 tablet daily, Disp: 90 tablet, Rfl: 1    promethazine (PHENERGAN) 25 MG tablet, Take 1 tablet (25 mg total) by mouth every 4 (four) hours as needed for Nausea., Disp: 30 tablet, Rfl: 5    VIT A/VIT C/VIT E/ZINC/COPPER (ICAPS AREDS ORAL), Take 1 capsule by mouth 2 (two) times a day. , Disp: , Rfl:     HYDROcodone-acetaminophen (NORCO) 5-325 mg per tablet, Take 1 tablet by mouth every 6 (six) hours as needed for Pain. (Patient not taking: Reported on 7/11/2024), Disp: , Rfl:     omeprazole (PRILOSEC) 40 MG capsule, Take 1 capsule by mouth daily., Disp: 90 capsule, Rfl: 2    Current Facility-Administered Medications:     diphenhydrAMINE injection 25 mg, 25 mg, Intravenous, Once, Patito Gibson, NP-C        Objective:       Physical Examination:     BP (!) 160/70   Pulse 69   Temp 98.4 °F (36.9 °C)   Resp 16   Ht 5' 3" (1.6 m)   Wt 52.1 kg (114 lb 12.8 oz)   BMI 20.34 kg/m²     Physical Exam  Constitutional:       Appearance: Normal appearance.   HENT:      Head: Normocephalic and atraumatic.   Eyes:      General: No scleral icterus.     Conjunctiva/sclera: Conjunctivae normal.   Cardiovascular:      Rate and Rhythm: Normal rate.   Pulmonary:      Effort: Pulmonary effort is normal.   Abdominal:      General: Abdomen is flat.   Neurological:      General: No focal deficit present.      Mental Status: She is alert and oriented to person, place, and time.   Psychiatric:         Mood and Affect: Mood normal.         Behavior: Behavior normal.         Thought Content: Thought content normal.         Judgment: Judgment normal.         Labs: "   Recent Results (from the past 336 hour(s))   CBC W/ AUTO DIFFERENTIAL    Collection Time: 07/08/24  2:25 PM   Result Value Ref Range    WBC 7.67 3.90 - 12.70 K/uL    Hemoglobin 9.5 (L) 12.0 - 16.0 g/dL    Hematocrit 30.5 (L) 37.0 - 48.5 %    Platelets 289 150 - 450 K/uL     CMP  Sodium   Date Value Ref Range Status   07/08/2024 140 136 - 145 mmol/L Final     Potassium   Date Value Ref Range Status   07/08/2024 3.8 3.5 - 5.1 mmol/L Final     Chloride   Date Value Ref Range Status   07/08/2024 104 95 - 110 mmol/L Final     CO2   Date Value Ref Range Status   07/08/2024 28 23 - 29 mmol/L Final     Glucose   Date Value Ref Range Status   07/08/2024 152 (H) 70 - 110 mg/dL Final     BUN   Date Value Ref Range Status   07/08/2024 23 8 - 23 mg/dL Final     Creatinine   Date Value Ref Range Status   07/08/2024 1.0 0.5 - 1.4 mg/dL Final     Calcium   Date Value Ref Range Status   07/08/2024 8.4 (L) 8.7 - 10.5 mg/dL Final     Total Protein   Date Value Ref Range Status   07/08/2024 6.3 6.0 - 8.4 g/dL Final     Albumin   Date Value Ref Range Status   07/08/2024 3.2 (L) 3.5 - 5.2 g/dL Final     Total Bilirubin   Date Value Ref Range Status   07/08/2024 0.2 0.1 - 1.0 mg/dL Final     Comment:     For infants and newborns, interpretation of results should be based  on gestational age, weight and in agreement with clinical  observations.    Premature Infant recommended reference ranges:  Up to 24 hours.............<8.0 mg/dL  Up to 48 hours............<12.0 mg/dL  3-5 days..................<15.0 mg/dL  6-29 days.................<15.0 mg/dL       Alkaline Phosphatase   Date Value Ref Range Status   07/08/2024 56 55 - 135 U/L Final     AST   Date Value Ref Range Status   07/08/2024 11 10 - 40 U/L Final     ALT   Date Value Ref Range Status   07/08/2024 9 (L) 10 - 44 U/L Final     Anion Gap   Date Value Ref Range Status   07/08/2024 8 8 - 16 mmol/L Final     eGFR if    Date Value Ref Range Status   04/28/2022 40.6 (A)  ">60 mL/min/1.73 m^2 Final     eGFR if non    Date Value Ref Range Status   04/28/2022 35.3 (A) >60 mL/min/1.73 m^2 Final     Comment:     Calculation used to obtain the estimated glomerular filtration  rate (eGFR) is the CKD-EPI equation.        Lab Results   Component Value Date    CEA 1.3 07/23/2020     No results found for: "PSA"        Assessment/Plan:     Problem List Items Addressed This Visit       Primary malignant neoplasm of bronchus of right lower lobe - Primary     Patient is now on Alimta maintenance therapy and is doing well with this.  Labs look ok and she is feeling ok overall.  Will continue therapy, labs allowing.           Iron deficiency anemia due to chronic blood loss     Hb is 9.5g/dl currently.  Will need to watch closely as she may need blood at a higher Hb given her pulmonary disease.          Chronic obstructive pulmonary disease, unspecified COPD type     She is doing ok currently.  On continuous 02 and doing ok with this.  Will need to watch this closely throughout her therapy.                 Discussion:     Follow up in about 3 weeks (around 8/1/2024).      Electronically signed by Virgil Lloyd      "

## 2024-07-11 NOTE — ASSESSMENT & PLAN NOTE
Hb is 9.5g/dl currently.  Will need to watch closely as she may need blood at a higher Hb given her pulmonary disease.

## 2024-07-11 NOTE — ASSESSMENT & PLAN NOTE
She is doing ok currently.  On continuous 02 and doing ok with this.  Will need to watch this closely throughout her therapy.

## 2024-07-11 NOTE — ASSESSMENT & PLAN NOTE
Patient is now on Alimta maintenance therapy and is doing well with this.  Labs look ok and she is feeling ok overall.  Will continue therapy, labs allowing.

## 2024-07-17 ENCOUNTER — LAB VISIT (OUTPATIENT)
Dept: LAB | Facility: HOSPITAL | Age: 84
End: 2024-07-17
Attending: NURSE PRACTITIONER
Payer: MEDICARE

## 2024-07-17 DIAGNOSIS — D63.0 ANEMIA IN NEOPLASTIC DISEASE: ICD-10-CM

## 2024-07-17 DIAGNOSIS — C34.31 MALIGNANT NEOPLASM OF LOWER LOBE OF RIGHT LUNG: ICD-10-CM

## 2024-07-17 LAB
ALBUMIN SERPL BCP-MCNC: 3.1 G/DL (ref 3.5–5.2)
ALP SERPL-CCNC: 53 U/L (ref 55–135)
ALT SERPL W/O P-5'-P-CCNC: 14 U/L (ref 10–44)
ANION GAP SERPL CALC-SCNC: 9 MMOL/L (ref 8–16)
AST SERPL-CCNC: 14 U/L (ref 10–40)
BASOPHILS # BLD AUTO: 0.02 K/UL (ref 0–0.2)
BASOPHILS NFR BLD: 0.6 % (ref 0–1.9)
BILIRUB SERPL-MCNC: 0.3 MG/DL (ref 0.1–1)
BUN SERPL-MCNC: 22 MG/DL (ref 8–23)
CALCIUM SERPL-MCNC: 8.5 MG/DL (ref 8.7–10.5)
CHLORIDE SERPL-SCNC: 101 MMOL/L (ref 95–110)
CO2 SERPL-SCNC: 29 MMOL/L (ref 23–29)
CREAT SERPL-MCNC: 1 MG/DL (ref 0.5–1.4)
DIFFERENTIAL METHOD BLD: ABNORMAL
EOSINOPHIL # BLD AUTO: 0 K/UL (ref 0–0.5)
EOSINOPHIL NFR BLD: 0.8 % (ref 0–8)
ERYTHROCYTE [DISTWIDTH] IN BLOOD BY AUTOMATED COUNT: 14 % (ref 11.5–14.5)
EST. GFR  (NO RACE VARIABLE): 55.9 ML/MIN/1.73 M^2
GLUCOSE SERPL-MCNC: 145 MG/DL (ref 70–110)
HCT VFR BLD AUTO: 32.6 % (ref 37–48.5)
HGB BLD-MCNC: 9.7 G/DL (ref 12–16)
IMM GRANULOCYTES # BLD AUTO: 0.02 K/UL (ref 0–0.04)
IMM GRANULOCYTES NFR BLD AUTO: 0.6 % (ref 0–0.5)
LYMPHOCYTES # BLD AUTO: 0.5 K/UL (ref 1–4.8)
LYMPHOCYTES NFR BLD: 13.6 % (ref 18–48)
MAGNESIUM SERPL-MCNC: 1.7 MG/DL (ref 1.6–2.6)
MCH RBC QN AUTO: 29.2 PG (ref 27–31)
MCHC RBC AUTO-ENTMCNC: 29.8 G/DL (ref 32–36)
MCV RBC AUTO: 98 FL (ref 82–98)
MONOCYTES # BLD AUTO: 0.8 K/UL (ref 0.3–1)
MONOCYTES NFR BLD: 23.2 % (ref 4–15)
NEUTROPHILS # BLD AUTO: 2.2 K/UL (ref 1.8–7.7)
NEUTROPHILS NFR BLD: 61.2 % (ref 38–73)
NRBC BLD-RTO: 0 /100 WBC
PLATELET # BLD AUTO: 143 K/UL (ref 150–450)
PMV BLD AUTO: 9.7 FL (ref 9.2–12.9)
POTASSIUM SERPL-SCNC: 4.1 MMOL/L (ref 3.5–5.1)
PROT SERPL-MCNC: 6.4 G/DL (ref 6–8.4)
RBC # BLD AUTO: 3.32 M/UL (ref 4–5.4)
SODIUM SERPL-SCNC: 139 MMOL/L (ref 136–145)
WBC # BLD AUTO: 3.54 K/UL (ref 3.9–12.7)

## 2024-07-17 PROCEDURE — 80053 COMPREHEN METABOLIC PANEL: CPT | Performed by: NURSE PRACTITIONER

## 2024-07-17 PROCEDURE — 36415 COLL VENOUS BLD VENIPUNCTURE: CPT | Performed by: NURSE PRACTITIONER

## 2024-07-17 PROCEDURE — 85025 COMPLETE CBC W/AUTO DIFF WBC: CPT | Performed by: NURSE PRACTITIONER

## 2024-07-17 PROCEDURE — 83735 ASSAY OF MAGNESIUM: CPT | Performed by: NURSE PRACTITIONER

## 2024-07-25 NOTE — PROGRESS NOTES
Electrophysiology Clinic Note    Reason for new patient visit: Evaluation and recommendations regarding paroxysmal atrial fibrillation.     PRESENTING HISTORY:     History of Present Illness:  Ms. Alexa Otero is a mercedes 84-year-old woman who presents today for evaluation and recommendations regarding paroxysmal atrial fibrillation. She has a past medical history significant for reported postoperative paroxysmal atrial fibrillation, coronary artery disease s/p prior PCI of the LAD, LCX, and RCA in October 2023, a history of VF arrest during her prior angiogram with revascularization, postoperative atrial fibrillation noted following resuscitation, hypertension, hyperlipidemia, lung cancer s/p right lower lobectomy, presently maintained on continued chemotherapy and 2L of home oxygen, COPD, type II diabetes mellitus, CKD stage III, osteoarthritis, GERD, macular degeneration, and cataracts.     She is followed in general cardiology with Dr. Avelar and was recently seen in clinic on 6/26/2024. At that encounter, they discussed her history of lung cancer and the need to undergo a thoracentesis followed subsequently by a right lobe lobectomy. As part of her preoperative evaluation, she underwent assessment of her coronary arteries, and underwent placement of PCI to the LAD, LCX, and RCA at [facility] in [place]. Per chart review, her systolic function has remained normal on echocardiogram assessment. Per her report, this procedure was complicated by an event of VF arrest with revascularization, and she subsequently had an event of postoperative atrial fibrillation following resuscitation. She continues to see her general cardiologist in [place], Dr. Julio, but has established here in [place], as this is closer to her home. Initially following her diagnosis of postoperative atrial fibrillation, she was prescribed apixaban; however, she has remained on aspirin and clopidogrel therapy  "without apixaban at the instruction of Dr. Julio, once she has not evidenced any recurrent events of atrial fibrillation. She continues to follow with Hematology/Oncology and Pulmonology. She has since been discontinued from her prior amiodarone therapy. Since her surgery and evaluation with Dr. Avelar, she has completed a 30-day ambulatory event monitor that did not reveal any subsequent events of paroxysmal atrial fibrillation. She denies any symptoms of subsequent palpitations.     In discussion with Ms. Otero today, she tells me that she is feeling overall quite well. She denies any episodes of dizziness, lightheadedness, syncope/presyncope, chest pain or chest discomfort, recurrent palpitations, nausea or vomiting, orthopnea, or PND. She reports baseline shortness of breath and dyspnea with exertion that has remained stable on her current 2L of home oxygen therapy. She can climb 1 flight of stairs prior to needing to take a break, but would be "winded" at the top. She reports baseline trace bilateral pedal edema that is unchanged. She continues to attempt to increase her level of physical activity.     Review of Systems:  Review of Systems   Constitutional:  Positive for fatigue. Negative for activity change.        Fatigue reported with ongoing chemotherapy.    HENT:  Negative for nasal congestion, nosebleeds, postnasal drip, rhinorrhea, sinus pressure/congestion, sneezing and sore throat.    Respiratory:  Positive for shortness of breath. Negative for apnea, cough, chest tightness and wheezing.    Cardiovascular:  Positive for leg swelling. Negative for chest pain, palpitations and claudication.   Gastrointestinal:  Positive for reflux. Negative for abdominal distention, abdominal pain, blood in stool, change in bowel habit, constipation, diarrhea, nausea and vomiting.   Genitourinary:  Negative for dysuria and hematuria.   Musculoskeletal:  Positive for arthralgias and back pain. Negative for gait problem. "   Neurological:  Negative for dizziness, seizures, syncope, weakness, light-headedness, headaches and coordination difficulties.        PAST HISTORY:     Past Medical History:   Diagnosis Date    Arthritis     Cardiac arrest 10/2023    Cataract     Chronic obstructive pulmonary disease, unspecified COPD type 3/20/2024    Colon polyp     Coronary artery disease 3/20/2024    Diabetes mellitus type II 2012    Encounter for blood transfusion     Extra-ovarian endometrioid adenocarcinoma 2020    GERD (gastroesophageal reflux disease)     History of chronic cough     clear productive    Hyperlipidemia     Hypertension     Macular degeneration     Ovarian cancer     PONV (postoperative nausea and vomiting)     Squamous cell carcinoma 1980's    precancer of cervix       Past Surgical History:   Procedure Laterality Date    AUGMENTATION OF BREAST      BRONCHOSCOPY N/A 12/12/2023    Procedure: BRONCHOSCOPY;  Surgeon: Neva Christian MD;  Location: [facility];  Service: ENT;  Laterality: N/A;    BRONCHOSCOPY WITH FLUOROSCOPY Right 05/11/2023    Procedure: BRONCHOSCOPY, WITH FLUOROSCOPY;  Surgeon: Neva Christian MD;  Location: [facility];  Service: Pulmonary;  Laterality: Right;    BRONCHOSCOPY WITH FLUOROSCOPY N/A 12/15/2023    Procedure: BRONCHOSCOPY, WITH FLUOROSCOPY;  Surgeon: Neva Christian MD;  Location: [facility];  Service: Pulmonary;  Laterality: N/A;    CATARACT EXTRACTION BILATERAL W/ ANTERIOR VITRECTOMY Bilateral     CHOLECYSTECTOMY      COLONOSCOPY      COLONOSCOPY N/A 04/20/2023    Procedure: COLONOSCOPY;  Surgeon: Sabino Stephenson MD;  Location: [facility];  Service: Endoscopy;  Laterality: N/A;    CORONARY STENT PLACEMENT  10/2023    x 3    ESOPHAGOGASTRODUODENOSCOPY N/A 06/20/2018    Procedure: EGD (ESOPHAGOGASTRODUODENOSCOPY);  Surgeon: Sabino Stephenson MD;  Location: [facility];  Service: Endoscopy;  Laterality: N/A;    ESOPHAGOGASTRODUODENOSCOPY N/A 03/31/2023    Procedure: EGD  (ESOPHAGOGASTRODUODENOSCOPY);  Surgeon: Sabino Stephenson MD;  Location: [facility] ENDO;  Service: Endoscopy;  Laterality: N/A;    ESOPHAGOGASTRODUODENOSCOPY N/A 12/11/2023    Procedure: EGD (ESOPHAGOGASTRODUODENOSCOPY);  Surgeon: Pretty Salgado MD;  Location: [facility] ENDO;  Service: Endoscopy;  Laterality: N/A;    HYSTERECTOMY  1976    partial    INJECTION OF ANESTHETIC AGENT AROUND MEDIAL BRANCH NERVES INNERVATING LUMBAR FACET JOINT Right 05/10/2023    Procedure: Block-nerve-Lateral-branch-lumbar;  Surgeon: Agustin Robles MD;  Location: [facility] OR;  Service: Pain Management;  Laterality: Right;  L5 and s1,s2 LBB    INJECTION OF ANESTHETIC AGENT AROUND MEDIAL BRANCH NERVES INNERVATING LUMBAR FACET JOINT Right 05/30/2023    Procedure: Block-nerve-medial branch-lumbar;  Surgeon: Agustin Robles MD;  Location: [facility];  Service: Pain Management;  Laterality: Right;  L5, s1 ,s2 LBB #2    INJECTION OF ANESTHETIC AGENT AROUND NERVE Right 10/31/2023    Procedure: INTERCOSTAL NERVE BLOCK;  Surgeon: Washington Shankar MD;  Location: [facility];  Service: Cardiothoracic;  Laterality: Right;    INJECTION, SACROILIAC JOINT Right 01/26/2023    Procedure: INJECTION,SACROILIAC JOINT;  Surgeon: Agustin Robles MD;  Location: [facility] OR;  Service: Pain Management;  Laterality: Right;    INSERTION OF TUNNELED CENTRAL VENOUS CATHETER (CVC) WITH SUBCUTANEOUS PORT Right 10/19/2020    Procedure: INSERTION, PORT-A-CATH;  Surgeon: Misti Mcfarland MD;  Location: [facility] OR;  Service: General;  Laterality: Right;    INTRAMEDULLARY RODDING OF TROCHANTER OF FEMUR Right 11/28/2021    Procedure: INSERTION, INTRAMEDULLARY LUC, FEMUR, TROCHANTER/RIGHT TFN DR FAJARDO NOTIFIED REP;  Surgeon: Kp Fajardo MD;  Location: [facility] OR;  Service: Orthopedics;  Laterality: Right;  SKIP    ROBOT-ASSISTED LAPAROSCOPIC LYMPHADENECTOMY USING DA NELLA XI N/A 09/21/2020    Procedure: XI ROBOTIC LYMPHADENECTOMY-pelvic and kell-aortic;  Surgeon: Altagracia Ray MD;  Location:  [facility] OR;  Service: OB/GYN;  Laterality: N/A;    ROBOT-ASSISTED LAPAROSCOPIC OMENTECTOMY USING DA NELLA XI N/A 09/21/2020    Procedure: XI ROBOTIC OMENTECTOMY;  Surgeon: Altagracia Ray MD;  Location: [facility] OR;  Service: OB/GYN;  Laterality: N/A;    ROBOT-ASSISTED LAPAROSCOPIC SALPINGO-OOPHORECTOMY USING DA NELLA XI Bilateral 09/21/2020    Procedure: XI ROBOTIC SALPINGO-OOPHORECTOMY;  Surgeon: Altagracia Rya MD;  Location: [facility] OR;  Service: OB/GYN;  Laterality: Bilateral;    THORACOSCOPIC BIOPSY OF PLEURA Right 10/31/2023    Procedure: VATS, WITH PLEURA BIOPSY;  Surgeon: Washington Shankar MD;  Location: [facility] OR;  Service: Cardiothoracic;  Laterality: Right;  FROZEN SECTION   **KRISTEN-ASSISDT**    THORACOTOMY Right 10/31/2023    Procedure: THORACOTOMY;  Surgeon: Washington Shankar MD;  Location: [facility] OR;  Service: Cardiothoracic;  Laterality: Right;    UPPER GASTROINTESTINAL ENDOSCOPY         Family History:  Family History   Problem Relation Name Age of Onset    Cancer Mother  57        lung    Cancer Father  56        oral    Skin cancer Sister      Skin cancer Brother      Melanoma Brother      Skin cancer Brother      Breast cancer Maternal Grandmother      Breast cancer Paternal Grandmother      Colon polyps Daughter      Psoriasis Neg Hx      Lupus Neg Hx      Eczema Neg Hx      Colon cancer Neg Hx      Crohn's disease Neg Hx      Esophageal cancer Neg Hx      Stomach cancer Neg Hx      Ulcerative colitis Neg Hx         Social History:  She  reports that she quit smoking about 43 years ago. Her smoking use included cigarettes. She started smoking about 63 years ago. She has a 20 pack-year smoking history. She has never used smokeless tobacco. She reports current alcohol use of about 1.0 standard drink of alcohol per week. She reports that she does not use drugs.      MEDICATIONS & ALLERGIES:     Review of patient's allergies indicates:  No Known Allergies    Current Outpatient Medications on File Prior to  Visit   Medication Sig Dispense Refill    amiodarone (PACERONE) 200 MG Tab Take 1 tablet (200 mg total) by mouth 2 (two) times daily. 60 tablet 11    aspirin (ECOTRIN) 81 MG EC tablet Take 81 mg by mouth once daily.      benazepriL (LOTENSIN) 40 MG tablet Take 0.5 tablets (20 mg total) by mouth once daily. 45 tablet 3    clopidogreL (PLAVIX) 75 mg tablet Take 1 tablet (75 mg total) by mouth once daily. 90 tablet 2    folic acid (FOLVITE) 1 MG tablet Take 1 tablet (1 mg total) by mouth once daily. 100 tablet 3    gabapentin (NEURONTIN) 100 MG capsule Take 1 capsule (100 mg total) by mouth 2 (two) times daily. 180 capsule 3    HYDROcodone-acetaminophen (NORCO) 5-325 mg per tablet Take 1 tablet by mouth every 6 (six) hours as needed for Pain. (Patient not taking: Reported on 7/11/2024)      LORazepam (ATIVAN) 1 MG tablet Take 1 tablet (1 mg total) by mouth every 6 (six) hours as needed for Anxiety (insomnia). 30 tablet 2    magnesium oxide (MAG-OX) 400 mg (241.3 mg magnesium) tablet Take 1 tablet (400 mg total) by mouth once daily. Patient requested refill 90 tablet 3    melatonin 5 mg Chew Take 1 tablet by mouth nightly as needed (insomnia).      metoprolol succinate (TOPROL-XL) 25 MG 24 hr tablet Take 1 tablet (25 mg total) by mouth once daily. 90 tablet 3    omeprazole (PRILOSEC) 40 MG capsule Take 1 capsule by mouth daily. 90 capsule 2    ondansetron (ZOFRAN) 8 MG tablet Take 1 tablet (8 mg total) by mouth every 8 (eight) hours as needed. 30 tablet 5    potassium chloride (K-TAB) 20 mEq Take 1 tablet by mouth once daily.      potassium chloride SA (K-DUR,KLOR-CON) 20 MEQ tablet Take 1 tablet daily 90 tablet 1    promethazine (PHENERGAN) 25 MG tablet Take 1 tablet (25 mg total) by mouth every 4 (four) hours as needed for Nausea. 30 tablet 5    VIT A/VIT C/VIT E/ZINC/COPPER (ICAPS AREDS ORAL) Take 1 capsule by mouth 2 (two) times a day.        Current Facility-Administered Medications on File Prior to Visit  "  Medication Dose Route Frequency Provider Last Rate Last Admin    diphenhydrAMINE injection 25 mg  25 mg Intravenous Once Patito Gibson, NP-C            OBJECTIVE:     Vital Signs:  BP (!) 170/68 (BP Location: Right arm, Patient Position: Sitting)   Pulse 74   Ht 5' 3" (1.6 m)   Wt 52 kg (114 lb 10.2 oz)   BMI 20.31 kg/m²     Physical Exam:  Physical Exam  Constitutional:       General: She is not in acute distress.     Appearance: Normal appearance. She is not ill-appearing or diaphoretic.      Comments: Well-appearing woman on 2L oxygen via nasal cannula.   HENT:      Head: Normocephalic and atraumatic.      Nose: Nose normal.      Mouth/Throat:      Mouth: Mucous membranes are moist.      Pharynx: Oropharynx is clear.   Eyes:      Pupils: Pupils are equal, round, and reactive to light.   Cardiovascular:      Rate and Rhythm: Normal rate and regular rhythm.      Pulses: Normal pulses.      Heart sounds: Normal heart sounds. No murmur heard.     No friction rub. No gallop.   Pulmonary:      Effort: Pulmonary effort is normal. No respiratory distress.      Breath sounds: Normal breath sounds. No wheezing, rhonchi or rales.      Comments: Diminished breath sounds in the RLL base.   Chest:      Chest wall: No tenderness.   Abdominal:      General: There is no distension.      Palpations: Abdomen is soft.      Tenderness: There is no abdominal tenderness.   Musculoskeletal:         General: No swelling or tenderness.      Cervical back: Normal range of motion.      Right lower leg: No edema.      Left lower leg: No edema.   Skin:     General: Skin is warm and dry.      Findings: No erythema, lesion or rash.   Neurological:      General: No focal deficit present.      Mental Status: She is alert and oriented to person, place, and time. Mental status is at baseline.      Motor: No weakness.      Gait: Gait normal.   Psychiatric:         Mood and Affect: Mood normal.         Behavior: Behavior normal.      "   Laboratory Data:  Lab Results   Component Value Date    WBC 3.54 (L) 07/17/2024    HGB 9.7 (L) 07/17/2024    HCT 32.6 (L) 07/17/2024    MCV 98 07/17/2024     (L) 07/17/2024     Lab Results   Component Value Date     (H) 07/17/2024     07/17/2024    K 4.1 07/17/2024     07/17/2024    CO2 29 07/17/2024    BUN 22 07/17/2024    CREATININE 1.0 07/17/2024    CALCIUM 8.5 (L) 07/17/2024    MG 1.7 07/17/2024     Lab Results   Component Value Date    INR 1.3 (H) 12/03/2023    INR 0.9 10/27/2023    INR 1.1 10/15/2023       Pertinent Cardiac Data:  ECG: Normal sinus rhythm with rate of 85 bpm,  ms, QRS 68 ms, QT/QTc 368/437 ms.     Exercise Nuclear Cardiac Stress Test - 9/3/2014:  1. The perfusion scan is free of evidence for myocardial ischemia or injury.   2. Resting wall motion is physiologic.   3. Resting LV function is normal.  (normal is 55 - 69)   4. The ventricular volumes are normal at rest and stress.   5. The extracardiac distribution of radioactivity is normal.     Resting 2D Transthoracic Echocardiogram - 10/13/2023:    Left Ventricle: The left ventricle is normal in size. Normal wall thickness. Normal wall motion. There is normal systolic function with a visually estimated ejection fraction of 55 - 60%. Grade I diastolic dysfunction.    Right Ventricle: Normal right ventricular cavity size. Wall thickness is normal. Right ventricle wall motion  is normal. Systolic function is normal.    Aortic Valve: There is moderate aortic valve sclerosis.    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 There is mild mitral annular calcification present. There is no stenosis. The mean pressure gradient across the mitral valve is 3 mmHg at a heart rate of  bpm. There is mild regurgitation.    Tricuspid Valve: There is mild regurgitation.  No pulmonary hypertension.    IVC/SVC: Normal venous pressure at 3 mmHg.    30-Day Ambulatory Event Monitor - 6/27/2024:    Predominant  underlying rhythm was Sinus Rhythm.    Patient had a min HR of 52 bpm, max HR of 122 bpm, and avg HR of 68 bpm.    5 Supraventricular Tachycardia runs occurred, the run with the fastest interval lasting 5 beats with a max rate of 122 bpm, the longest lasting 10 beats with an avg rate of 104 bpm.    Isolated SVEs were rare (<1.0%, 1016), SVE Couplets were rare (<1.0%, 30), and SVE Triplets were rare (<1.0%, 5).    Isolated VEs were rare (<1.0%, 13), and no VE Couplets or VE Triplets were present.    There were no patient reported events.    There were 0 auto-triggered events.      ASSESSMENT & PLAN:   Ms. Alexa Otero is a mercedes 84-year-old woman who presents today for evaluation and recommendations regarding paroxysmal atrial fibrillation. She has a past medical history significant for reported postoperative paroxysmal atrial fibrillation, coronary artery disease s/p prior PCI of the LAD, LCX, and RCA in October 2023, a history of VF arrest during her prior angiogram with revascularization, postoperative atrial fibrillation noted following resuscitation, hypertension, hyperlipidemia, lung cancer s/p right lower lobectomy, presently maintained on continued chemotherapy and 2L of home oxygen, COPD, type II diabetes mellitus, CKD stage III, osteoarthritis, GERD, macular degeneration, and cataracts.     We reviewed her recent coronary angiogram with revascularization that was complicated by VF arrest and postoperative atrial fibrillation. We discussed that this event of arrhythmia was likely secondary to her coronary revascularization with reported low magnesium at that time. She has not had any subsequent events of arrhythmia, and was successfully discontinued from amiodarone therapy per her cardiologist in [place]. Given her history of lung cancer requiring oxygen therapy, we will attempt to avoid long-term amiodarone use. She remains on metoprolol succinate 25mg po daily. Review of her post-arrest echocardiogram  reveals preserved systolic function, LVEF 55-60%, with no evidence of regional wall motion abnormalities. Her ECG was reviewed with a narrow intrinsic QRSd. She has remained in sinus rhythm on all subsequent ECGs, and we reviewed the results of her recent 30-day ambulatory event monitor with no subsequent atrial fibrillation. She remains on DAPT with aspirin and clopidogrel following her stent placement, and remains off of apixaban therapy at this time. We discussed that should she evidence future atrial fibrillation, we will need to stop her aspirin and resume apixaban therapy in an effort to avoid triple therapy. We will continue to monitor for any future atrial fibrillation events. She remains on statin therapy for her hyperlipidemia, with BP at goal today on her current antihypertensive regimen.      This patient will return to clinic with me in six months with continued PCP, Hematology/Oncology, Pulmonary, and General Cardiology visits in the interim. All questions and concerns were addressed at this encounter.     Signing Physician:       JESE Villanueva MD  Electrophysiology Attending

## 2024-07-26 ENCOUNTER — LAB VISIT (OUTPATIENT)
Dept: LAB | Facility: HOSPITAL | Age: 84
End: 2024-07-26
Attending: NURSE PRACTITIONER
Payer: MEDICARE

## 2024-07-26 ENCOUNTER — OFFICE VISIT (OUTPATIENT)
Dept: CARDIOLOGY | Facility: CLINIC | Age: 84
End: 2024-07-26
Payer: MEDICARE

## 2024-07-26 VITALS
SYSTOLIC BLOOD PRESSURE: 170 MMHG | WEIGHT: 114.63 LBS | HEART RATE: 74 BPM | HEIGHT: 63 IN | DIASTOLIC BLOOD PRESSURE: 68 MMHG | BODY MASS INDEX: 20.31 KG/M2

## 2024-07-26 DIAGNOSIS — K21.9 GASTROESOPHAGEAL REFLUX DISEASE, UNSPECIFIED WHETHER ESOPHAGITIS PRESENT: ICD-10-CM

## 2024-07-26 DIAGNOSIS — I97.89 POSTOPERATIVE ATRIAL FIBRILLATION: Primary | ICD-10-CM

## 2024-07-26 DIAGNOSIS — G62.0 PERIPHERAL NEUROPATHY DUE TO CHEMOTHERAPY: ICD-10-CM

## 2024-07-26 DIAGNOSIS — I25.10 CORONARY ARTERY DISEASE INVOLVING NATIVE CORONARY ARTERY OF NATIVE HEART WITHOUT ANGINA PECTORIS: ICD-10-CM

## 2024-07-26 DIAGNOSIS — I10 PRIMARY HYPERTENSION: ICD-10-CM

## 2024-07-26 DIAGNOSIS — I70.0 ATHEROSCLEROSIS OF AORTA: ICD-10-CM

## 2024-07-26 DIAGNOSIS — E11.22 CONTROLLED TYPE 2 DIABETES MELLITUS WITH STAGE 3 CHRONIC KIDNEY DISEASE, WITHOUT LONG-TERM CURRENT USE OF INSULIN: ICD-10-CM

## 2024-07-26 DIAGNOSIS — D63.0 ANEMIA IN NEOPLASTIC DISEASE: ICD-10-CM

## 2024-07-26 DIAGNOSIS — N18.30 STAGE 3 CHRONIC KIDNEY DISEASE, UNSPECIFIED WHETHER STAGE 3A OR 3B CKD: ICD-10-CM

## 2024-07-26 DIAGNOSIS — Z90.2 STATUS POST LOBECTOMY OF LUNG: ICD-10-CM

## 2024-07-26 DIAGNOSIS — Z95.5 HISTORY OF CORONARY ANGIOPLASTY WITH INSERTION OF STENT: ICD-10-CM

## 2024-07-26 DIAGNOSIS — I46.9 CARDIOPULMONARY ARREST: ICD-10-CM

## 2024-07-26 DIAGNOSIS — I48.91 POSTOPERATIVE ATRIAL FIBRILLATION: Primary | ICD-10-CM

## 2024-07-26 DIAGNOSIS — C34.31 MALIGNANT NEOPLASM OF LOWER LOBE OF RIGHT LUNG: ICD-10-CM

## 2024-07-26 DIAGNOSIS — E78.2 MIXED HYPERLIPIDEMIA: ICD-10-CM

## 2024-07-26 DIAGNOSIS — I49.01 VF (VENTRICULAR FIBRILLATION): ICD-10-CM

## 2024-07-26 DIAGNOSIS — Z95.820 S/P ANGIOPLASTY WITH STENT: ICD-10-CM

## 2024-07-26 DIAGNOSIS — Z51.11 MAINTENANCE CHEMOTHERAPY: ICD-10-CM

## 2024-07-26 DIAGNOSIS — J44.9 CHRONIC OBSTRUCTIVE PULMONARY DISEASE, UNSPECIFIED COPD TYPE: ICD-10-CM

## 2024-07-26 DIAGNOSIS — T45.1X5A PERIPHERAL NEUROPATHY DUE TO CHEMOTHERAPY: ICD-10-CM

## 2024-07-26 DIAGNOSIS — N18.30 CONTROLLED TYPE 2 DIABETES MELLITUS WITH STAGE 3 CHRONIC KIDNEY DISEASE, WITHOUT LONG-TERM CURRENT USE OF INSULIN: ICD-10-CM

## 2024-07-26 DIAGNOSIS — M19.071 PRIMARY OSTEOARTHRITIS OF RIGHT ANKLE: ICD-10-CM

## 2024-07-26 LAB
ALBUMIN SERPL BCP-MCNC: 3.2 G/DL (ref 3.5–5.2)
ALP SERPL-CCNC: 63 U/L (ref 55–135)
ALT SERPL W/O P-5'-P-CCNC: 10 U/L (ref 10–44)
ANION GAP SERPL CALC-SCNC: 10 MMOL/L (ref 8–16)
AST SERPL-CCNC: 13 U/L (ref 10–40)
BASOPHILS # BLD AUTO: 0.01 K/UL (ref 0–0.2)
BASOPHILS NFR BLD: 0.1 % (ref 0–1.9)
BILIRUB SERPL-MCNC: 0.2 MG/DL (ref 0.1–1)
BUN SERPL-MCNC: 23 MG/DL (ref 8–23)
CALCIUM SERPL-MCNC: 8.6 MG/DL (ref 8.7–10.5)
CHLORIDE SERPL-SCNC: 103 MMOL/L (ref 95–110)
CO2 SERPL-SCNC: 26 MMOL/L (ref 23–29)
CREAT SERPL-MCNC: 1.1 MG/DL (ref 0.5–1.4)
DIFFERENTIAL METHOD BLD: ABNORMAL
EOSINOPHIL # BLD AUTO: 0.1 K/UL (ref 0–0.5)
EOSINOPHIL NFR BLD: 0.7 % (ref 0–8)
ERYTHROCYTE [DISTWIDTH] IN BLOOD BY AUTOMATED COUNT: 15 % (ref 11.5–14.5)
EST. GFR  (NO RACE VARIABLE): 49.9 ML/MIN/1.73 M^2
GLUCOSE SERPL-MCNC: 151 MG/DL (ref 70–110)
HCT VFR BLD AUTO: 31.2 % (ref 37–48.5)
HGB BLD-MCNC: 9.4 G/DL (ref 12–16)
IMM GRANULOCYTES # BLD AUTO: 0.05 K/UL (ref 0–0.04)
IMM GRANULOCYTES NFR BLD AUTO: 0.6 % (ref 0–0.5)
LYMPHOCYTES # BLD AUTO: 0.7 K/UL (ref 1–4.8)
LYMPHOCYTES NFR BLD: 7.5 % (ref 18–48)
MAGNESIUM SERPL-MCNC: 1.9 MG/DL (ref 1.6–2.6)
MCH RBC QN AUTO: 30 PG (ref 27–31)
MCHC RBC AUTO-ENTMCNC: 30.1 G/DL (ref 32–36)
MCV RBC AUTO: 100 FL (ref 82–98)
MONOCYTES # BLD AUTO: 1 K/UL (ref 0.3–1)
MONOCYTES NFR BLD: 11.2 % (ref 4–15)
NEUTROPHILS # BLD AUTO: 6.9 K/UL (ref 1.8–7.7)
NEUTROPHILS NFR BLD: 79.9 % (ref 38–73)
NRBC BLD-RTO: 0 /100 WBC
PLATELET # BLD AUTO: 329 K/UL (ref 150–450)
PMV BLD AUTO: 9.4 FL (ref 9.2–12.9)
POTASSIUM SERPL-SCNC: 4.1 MMOL/L (ref 3.5–5.1)
PROT SERPL-MCNC: 6.8 G/DL (ref 6–8.4)
RBC # BLD AUTO: 3.13 M/UL (ref 4–5.4)
SODIUM SERPL-SCNC: 139 MMOL/L (ref 136–145)
WBC # BLD AUTO: 8.63 K/UL (ref 3.9–12.7)

## 2024-07-26 PROCEDURE — 3078F DIAST BP <80 MM HG: CPT | Mod: HCNC,CPTII,S$GLB, | Performed by: STUDENT IN AN ORGANIZED HEALTH CARE EDUCATION/TRAINING PROGRAM

## 2024-07-26 PROCEDURE — 36415 COLL VENOUS BLD VENIPUNCTURE: CPT | Performed by: NURSE PRACTITIONER

## 2024-07-26 PROCEDURE — 3288F FALL RISK ASSESSMENT DOCD: CPT | Mod: HCNC,CPTII,S$GLB, | Performed by: STUDENT IN AN ORGANIZED HEALTH CARE EDUCATION/TRAINING PROGRAM

## 2024-07-26 PROCEDURE — 99999 PR PBB SHADOW E&M-EST. PATIENT-LVL IV: CPT | Mod: PBBFAC,HCNC,, | Performed by: STUDENT IN AN ORGANIZED HEALTH CARE EDUCATION/TRAINING PROGRAM

## 2024-07-26 PROCEDURE — 99205 OFFICE O/P NEW HI 60 MIN: CPT | Mod: HCNC,S$GLB,, | Performed by: STUDENT IN AN ORGANIZED HEALTH CARE EDUCATION/TRAINING PROGRAM

## 2024-07-26 PROCEDURE — 1157F ADVNC CARE PLAN IN RCRD: CPT | Mod: HCNC,CPTII,S$GLB, | Performed by: STUDENT IN AN ORGANIZED HEALTH CARE EDUCATION/TRAINING PROGRAM

## 2024-07-26 PROCEDURE — 80053 COMPREHEN METABOLIC PANEL: CPT | Performed by: NURSE PRACTITIONER

## 2024-07-26 PROCEDURE — 83735 ASSAY OF MAGNESIUM: CPT | Performed by: NURSE PRACTITIONER

## 2024-07-26 PROCEDURE — 3077F SYST BP >= 140 MM HG: CPT | Mod: HCNC,CPTII,S$GLB, | Performed by: STUDENT IN AN ORGANIZED HEALTH CARE EDUCATION/TRAINING PROGRAM

## 2024-07-26 PROCEDURE — 1126F AMNT PAIN NOTED NONE PRSNT: CPT | Mod: HCNC,CPTII,S$GLB, | Performed by: STUDENT IN AN ORGANIZED HEALTH CARE EDUCATION/TRAINING PROGRAM

## 2024-07-26 PROCEDURE — 85025 COMPLETE CBC W/AUTO DIFF WBC: CPT | Performed by: NURSE PRACTITIONER

## 2024-07-26 PROCEDURE — 1159F MED LIST DOCD IN RCRD: CPT | Mod: HCNC,CPTII,S$GLB, | Performed by: STUDENT IN AN ORGANIZED HEALTH CARE EDUCATION/TRAINING PROGRAM

## 2024-07-26 PROCEDURE — 1101F PT FALLS ASSESS-DOCD LE1/YR: CPT | Mod: HCNC,CPTII,S$GLB, | Performed by: STUDENT IN AN ORGANIZED HEALTH CARE EDUCATION/TRAINING PROGRAM

## 2024-07-26 RX ORDER — CYANOCOBALAMIN 1000 UG/ML
1000 INJECTION, SOLUTION INTRAMUSCULAR; SUBCUTANEOUS
OUTPATIENT
Start: 2024-07-29

## 2024-07-26 RX ORDER — HEPARIN 100 UNIT/ML
500 SYRINGE INTRAVENOUS
OUTPATIENT
Start: 2024-07-29

## 2024-07-26 RX ORDER — SODIUM CHLORIDE 0.9 % (FLUSH) 0.9 %
10 SYRINGE (ML) INJECTION
OUTPATIENT
Start: 2024-07-29

## 2024-07-27 PROBLEM — E78.2 MIXED HYPERLIPIDEMIA: Status: ACTIVE | Noted: 2024-07-27

## 2024-07-27 PROBLEM — I97.89 POSTOPERATIVE ATRIAL FIBRILLATION: Status: ACTIVE | Noted: 2023-11-05

## 2024-07-27 PROBLEM — I48.91 POSTOPERATIVE ATRIAL FIBRILLATION: Status: ACTIVE | Noted: 2023-11-05

## 2024-07-27 PROBLEM — R79.89 ELEVATED TROPONIN: Status: RESOLVED | Noted: 2023-12-03 | Resolved: 2024-07-27

## 2024-07-27 PROBLEM — I48.0 PAROXYSMAL ATRIAL FIBRILLATION: Status: RESOLVED | Noted: 2023-11-05 | Resolved: 2024-07-27

## 2024-07-29 ENCOUNTER — INFUSION (OUTPATIENT)
Dept: INFUSION THERAPY | Facility: HOSPITAL | Age: 84
End: 2024-07-29
Attending: INTERNAL MEDICINE
Payer: MEDICARE

## 2024-07-29 VITALS
HEIGHT: 63 IN | RESPIRATION RATE: 16 BRPM | HEART RATE: 62 BPM | OXYGEN SATURATION: 96 % | WEIGHT: 116 LBS | SYSTOLIC BLOOD PRESSURE: 166 MMHG | DIASTOLIC BLOOD PRESSURE: 74 MMHG | TEMPERATURE: 98 F | BODY MASS INDEX: 20.55 KG/M2

## 2024-07-29 DIAGNOSIS — C34.31 PRIMARY MALIGNANT NEOPLASM OF BRONCHUS OF RIGHT LOWER LOBE: Primary | ICD-10-CM

## 2024-07-29 PROCEDURE — A4216 STERILE WATER/SALINE, 10 ML: HCPCS | Performed by: INTERNAL MEDICINE

## 2024-07-29 PROCEDURE — 96409 CHEMO IV PUSH SNGL DRUG: CPT

## 2024-07-29 PROCEDURE — 96372 THER/PROPH/DIAG INJ SC/IM: CPT | Mod: 59

## 2024-07-29 PROCEDURE — 25000003 PHARM REV CODE 250: Performed by: INTERNAL MEDICINE

## 2024-07-29 PROCEDURE — 63600175 PHARM REV CODE 636 W HCPCS: Performed by: INTERNAL MEDICINE

## 2024-07-29 PROCEDURE — 96367 TX/PROPH/DG ADDL SEQ IV INF: CPT

## 2024-07-29 PROCEDURE — 96375 TX/PRO/DX INJ NEW DRUG ADDON: CPT

## 2024-07-29 RX ORDER — SODIUM CHLORIDE 0.9 % (FLUSH) 0.9 %
10 SYRINGE (ML) INJECTION
Status: DISCONTINUED | OUTPATIENT
Start: 2024-07-29 | End: 2024-07-29 | Stop reason: HOSPADM

## 2024-07-29 RX ORDER — HEPARIN 100 UNIT/ML
500 SYRINGE INTRAVENOUS
Status: DISCONTINUED | OUTPATIENT
Start: 2024-07-29 | End: 2024-07-29 | Stop reason: HOSPADM

## 2024-07-29 RX ORDER — CYANOCOBALAMIN 1000 UG/ML
1000 INJECTION, SOLUTION INTRAMUSCULAR; SUBCUTANEOUS
Status: COMPLETED | OUTPATIENT
Start: 2024-07-29 | End: 2024-07-29

## 2024-07-29 RX ADMIN — SODIUM CHLORIDE, PRESERVATIVE FREE 10 ML: 5 INJECTION INTRAVENOUS at 03:07

## 2024-07-29 RX ADMIN — PALONOSETRON HYDROCHLORIDE 0.25 MG: 0.25 INJECTION INTRAVENOUS at 02:07

## 2024-07-29 RX ADMIN — APREPITANT 130 MG: 130 INJECTION, EMULSION INTRAVENOUS at 02:07

## 2024-07-29 RX ADMIN — CYANOCOBALAMIN 1000 MCG: 1000 INJECTION, SOLUTION INTRAMUSCULAR at 03:07

## 2024-07-29 RX ADMIN — HEPARIN 500 UNITS: 100 SYRINGE at 03:07

## 2024-07-29 RX ADMIN — PEMETREXED DISODIUM 575 MG: 500 INJECTION, POWDER, LYOPHILIZED, FOR SOLUTION INTRAVENOUS at 03:07

## 2024-07-29 NOTE — PLAN OF CARE
Problem: Fatigue  Goal: Improved Activity Tolerance  Outcome: Progressing  Intervention: Promote Improved Energy  Flowsheets (Taken 7/29/2024 5638)  Sleep/Rest Enhancement:   relaxation techniques promoted   regular sleep/rest pattern promoted   reading promoted  Environmental Support: rest periods encouraged

## 2024-07-30 NOTE — PROGRESS NOTES
PROGRESS NOTE    Subjective:       Patient ID: Alexa Otero is a 84 y.o. female.    9/21/2020  Bilateral oophorectomy  Right endometrioid Carcinoma  [id number]  S/p Carbo/Taxol x 6    8/15/2023 PET:  IMPRESSION:  1. Multifocal right lower lobe and right pleural FDG hypermetabolism as described, with associated right lower lobe consolidation. These are nonspecific and could be of infectious or inflammatory etiology in the clinical setting of chronic pneumonia, and or metastatic disease. Correlation with previous bronchoscopy results is needed.  2. Multiple new non hypermetabolic pulmonary nodules in both lungs, which are generally below size resolution for PET, but suspicious for pulmonary metastases.  3. No additional findings of FDG avid metastatic disease.    10/31/2023 RLL Lobectomy:  LUNG SYNOPTIC REPORT   PROCEDURE:     Lobectomy.   SPECIMEN LATERALITY:    Right   TUMOR SITE:     Lower lobe.   TUMOR SIZE:     Cannot be determined, 4.5 cm area of cavitation but    tumor appears to involve most if not all of the resected lobe.   TUMOR FOCALITY:    Single tumor.   HISTOLOGIC TYPE:    Invasive mucinous adenocarcinoma with pneumonic    pattern.   VISCEROPLEURAL INVASION:   Present   LYMPHOVASCULAR INVASION:   Not identified.   SPREAD OF TUMOR THROUGH AIR SPACES:  Present   DIRECT INVASION OF ADJACENT STRUCTURES: No adjacent structures present.   MARGINS:     All margins are uninvolved by tumor, margins examined:         Bronchial, distance of invasion of tumor from closest         margin: cannot be determined.   TREATMENT EFFECT:    No known presurgical therapy.   ADDITIONAL PATHOLOGIC FINDINGS:  Organized pulmonary emboli, pulmonary    infarct.   REGIONAL LYMPH NODES:    NUMBER OF LYMPH NODES INVOLVED:  Zero, number of lymph nodes examined:     1, yue         structures examined: 10 (hilar)   PATHOLOGIC STAGE CLASSIFICATION    OF PRIMARY TUMOR:    pT3     REGIONAL LYMPH NODES:   pN0     Comment: The sections show invasive mucinous adenocarcinoma with    findings that suggest so-called pneumonic pattern. there is diffuse    involvement with tumor present in all the histologic sections, often    with a lepidic pattern suggesting spread through the air spaces. The    reported CT findings of a consolidated appearance correlates with the    histologic findings and the reported clinical presentation of a    year-long cough productive of purulent mucus. Mucus is also a common    presentation of this somewhat uncommon pulmonary malignancy. These    tumors are two complete stages pT3 when there appears to be involvement    of the entire lobe; this appears to be appropriate in this case. There    are also organizing/organized pulmonary emboli within the vasculature,    possibly indicating hypercoagulability of malignancy as can be seen    especially with mucinous tumors. The tissue sampled in block 2F is    likely an infarct related to this. The history of endometrioid    adenocarcinoma is noted, but the current tumor represents a primary    lung malignancy and is unrelated to the prior cancer.     10/13/2023-Echo:  Normal systolic function with EF 55-60%  Grade I diastolic dysfunction    12/10/2023-CT CAP:  Progression of the alveolar consolidation in the right mid and lower lung with moderate size right pleural effusion. There is progression of the airspace disease within the right upper lobe with patchy groundglass opacity left upper lobe and left lung base. Findings are compatible with multifocal.     12/15/2023-Bronchoscopy:  LUNG, RIGHT MIDDLE LOBE, BIOPSY:   - ATYPICAL ADENOMATOUS HYPERPLASIA.   - IN A BACKGROUND OF REACTIVE FIBROSIS AND FIBRIN.     Comment:   Given the patient's history, this lesion is concerning for well    differentiated lepidic type adenocarcinoma but is quantitatively    insufficient (approximately 1 mm) for a definite diagnosis.     Multiple  additional leveled sections were examined.     Carbo/Pemetrexed x 4  1/25/2024-4/11/2024    Maintenance Pemetrexed:  Cycle 5: 7/8/2024  Cycle 6: 7/29/2024  Cycle 7: 8/19/2024- Due    4/23/2024-PET:  1. Interval improvement in hypermetabolic right lung airspace opacities, presumably reflecting infectious or inflammatory etiology.  2. No convincing evidence of recurrent neoplasm or metastatic disease.  3. Interval increased size of right pleural effusion.  4. Additional stable observations as described.      Chief Complaint:  Stage IV Lung cancer follow up    History of Present Illness:   Alexa Oetro is a 84 y.o. female who presents for follow up of lung cancer.      Patient started on maintenance pem since last visit and is tolerating it well. She denies any fever, CP, SOB is stable. She denies any rash,  diarrhea or nausea.     Family and Social history reviewed and is unchanged from 12/1/2023             Current Outpatient Medications:     aspirin (ECOTRIN) 81 MG EC tablet, Take 81 mg by mouth once daily., Disp: , Rfl:     benazepriL (LOTENSIN) 40 MG tablet, Take 0.5 tablets (20 mg total) by mouth once daily., Disp: 45 tablet, Rfl: 3    clopidogreL (PLAVIX) 75 mg tablet, Take 1 tablet (75 mg total) by mouth once daily., Disp: 90 tablet, Rfl: 2    folic acid (FOLVITE) 1 MG tablet, Take 1 tablet (1 mg total) by mouth once daily., Disp: 100 tablet, Rfl: 3    gabapentin (NEURONTIN) 100 MG capsule, Take 1 capsule (100 mg total) by mouth 2 (two) times daily., Disp: 180 capsule, Rfl: 3    HYDROcodone-acetaminophen (NORCO) 5-325 mg per tablet, Take 1 tablet by mouth every 6 (six) hours as needed for Pain. (Patient not taking: Reported on 7/11/2024), Disp: , Rfl:     LORazepam (ATIVAN) 1 MG tablet, Take 1 tablet (1 mg total) by mouth every 6 (six) hours as needed for Anxiety (insomnia)., Disp: 30 tablet, Rfl: 2    magnesium oxide (MAG-OX) 400 mg (241.3 mg magnesium) tablet, Take 1 tablet (400 mg total) by mouth once  daily. Patient requested refill, Disp: 90 tablet, Rfl: 3    melatonin 5 mg Chew, Take 1 tablet by mouth nightly as needed (insomnia)., Disp: , Rfl:     metoprolol succinate (TOPROL-XL) 25 MG 24 hr tablet, Take 1 tablet (25 mg total) by mouth once daily., Disp: 90 tablet, Rfl: 3    ondansetron (ZOFRAN) 8 MG tablet, Take 1 tablet (8 mg total) by mouth every 8 (eight) hours as needed., Disp: 30 tablet, Rfl: 5    potassium chloride (K-TAB) 20 mEq, Take 1 tablet by mouth once daily., Disp: , Rfl:     potassium chloride SA (K-DUR,KLOR-CON) 20 MEQ tablet, Take 1 tablet daily, Disp: 90 tablet, Rfl: 1    promethazine (PHENERGAN) 25 MG tablet, Take 1 tablet (25 mg total) by mouth every 4 (four) hours as needed for Nausea., Disp: 30 tablet, Rfl: 5    VIT A/VIT C/VIT E/ZINC/COPPER (ICAPS AREDS ORAL), Take 1 capsule by mouth 2 (two) times a day. , Disp: , Rfl:     Current Facility-Administered Medications:     diphenhydrAMINE injection 25 mg, 25 mg, Intravenous, Once, Patito Gibson, [facility]-C        Objective:       Physical Examination:     BP (!) 175/75   Pulse 61   Temp 97.7 °F (36.5 °C)   Resp 17   Wt 53.6 kg (118 lb 3.2 oz)   BMI 20.94 kg/m²     Physical Exam  Constitutional:       Appearance: Normal appearance.   HENT:      Head: Normocephalic and atraumatic.   Eyes:      General: No scleral icterus.     Conjunctiva/sclera: Conjunctivae normal.   Cardiovascular:      Rate and Rhythm: Normal rate.   Pulmonary:      Effort: Pulmonary effort is normal.      Comments: Portable Oxygen  Abdominal:      General: Abdomen is flat.   Skin:     General: Skin is warm and dry.   Neurological:      General: No focal deficit present.      Mental Status: She is alert and oriented to person, place, and time.   Psychiatric:         Mood and Affect: Mood normal.         Behavior: Behavior normal.         Thought Content: Thought content normal.         Judgment: Judgment normal.         Labs:   Recent Results (from the past 336 hour(s))    CBC W/ AUTO DIFFERENTIAL    Collection Time: 07/26/24 12:01 PM   Result Value Ref Range    WBC 8.63 3.90 - 12.70 K/uL    Hemoglobin 9.4 (L) 12.0 - 16.0 g/dL    Hematocrit 31.2 (L) 37.0 - 48.5 %    Platelets 329 150 - 450 K/uL     CMP  Sodium   Date Value Ref Range Status   07/26/2024 139 136 - 145 mmol/L Final     Potassium   Date Value Ref Range Status   07/26/2024 4.1 3.5 - 5.1 mmol/L Final     Chloride   Date Value Ref Range Status   07/26/2024 103 95 - 110 mmol/L Final     CO2   Date Value Ref Range Status   07/26/2024 26 23 - 29 mmol/L Final     Glucose   Date Value Ref Range Status   07/26/2024 151 (H) 70 - 110 mg/dL Final     BUN   Date Value Ref Range Status   07/26/2024 23 8 - 23 mg/dL Final     Creatinine   Date Value Ref Range Status   07/26/2024 1.1 0.5 - 1.4 mg/dL Final     Calcium   Date Value Ref Range Status   07/26/2024 8.6 (L) 8.7 - 10.5 mg/dL Final     Total Protein   Date Value Ref Range Status   07/26/2024 6.8 6.0 - 8.4 g/dL Final     Albumin   Date Value Ref Range Status   07/26/2024 3.2 (L) 3.5 - 5.2 g/dL Final     Total Bilirubin   Date Value Ref Range Status   07/26/2024 0.2 0.1 - 1.0 mg/dL Final     Comment:     For infants and newborns, interpretation of results should be based  on gestational age, weight and in agreement with clinical  observations.    Premature Infant recommended reference ranges:  Up to 24 hours.............<8.0 mg/dL  Up to 48 hours............<12.0 mg/dL  3-5 days..................<15.0 mg/dL  6-29 days.................<15.0 mg/dL       Alkaline Phosphatase   Date Value Ref Range Status   07/26/2024 63 55 - 135 U/L Final     AST   Date Value Ref Range Status   07/26/2024 13 10 - 40 U/L Final     ALT   Date Value Ref Range Status   07/26/2024 10 10 - 44 U/L Final     Anion Gap   Date Value Ref Range Status   07/26/2024 10 8 - 16 mmol/L Final     eGFR if    Date Value Ref Range Status   04/28/2022 40.6 (A) >60 mL/min/1.73 m^2 Final     eGFR if non  "   Date Value Ref Range Status   04/28/2022 35.3 (A) >60 mL/min/1.73 m^2 Final     Comment:     Calculation used to obtain the estimated glomerular filtration  rate (eGFR) is the CKD-EPI equation.        Lab Results   Component Value Date    CEA 1.3 07/23/2020     No results found for: "PSA"        Assessment/Plan:     Problem List Items Addressed This Visit          Neuro    Peripheral neuropathy due to chemotherapy       Pulmonary    Chronic obstructive pulmonary disease, unspecified COPD type       Cardiac/Vascular    HTN (hypertension)       Oncology    Primary malignant neoplasm of bronchus of right lower lobe - Primary    Relevant Orders    NM PET CT FDG Skull Base to Mid Thigh     Other Visit Diagnoses       Constipation, unspecified constipation type                Lung cancer- Continue with Alimta maintenance due 8/19/2024; PET scan in   2. HTN- Follow up with PCP; Keep BP log  3. Constipation- Continue Dulcolax      Discussion:     No follow-ups on file.      Electronically signed by Patito Gibson, MSN, APRN, AGNP-C, OCN        "

## 2024-07-31 ENCOUNTER — OFFICE VISIT (OUTPATIENT)
Facility: CLINIC | Age: 84
End: 2024-07-31
Payer: MEDICARE

## 2024-07-31 ENCOUNTER — PATIENT MESSAGE (OUTPATIENT)
Dept: FAMILY MEDICINE | Facility: CLINIC | Age: 84
End: 2024-07-31
Payer: MEDICARE

## 2024-07-31 VITALS
TEMPERATURE: 98 F | DIASTOLIC BLOOD PRESSURE: 75 MMHG | BODY MASS INDEX: 20.94 KG/M2 | HEART RATE: 61 BPM | WEIGHT: 118.19 LBS | RESPIRATION RATE: 17 BRPM | SYSTOLIC BLOOD PRESSURE: 175 MMHG

## 2024-07-31 DIAGNOSIS — T45.1X5A PERIPHERAL NEUROPATHY DUE TO CHEMOTHERAPY: ICD-10-CM

## 2024-07-31 DIAGNOSIS — C34.31 PRIMARY MALIGNANT NEOPLASM OF BRONCHUS OF RIGHT LOWER LOBE: Primary | ICD-10-CM

## 2024-07-31 DIAGNOSIS — K59.00 CONSTIPATION, UNSPECIFIED CONSTIPATION TYPE: ICD-10-CM

## 2024-07-31 DIAGNOSIS — G62.0 PERIPHERAL NEUROPATHY DUE TO CHEMOTHERAPY: ICD-10-CM

## 2024-07-31 DIAGNOSIS — I10 HYPERTENSION, UNSPECIFIED TYPE: ICD-10-CM

## 2024-07-31 DIAGNOSIS — J44.9 CHRONIC OBSTRUCTIVE PULMONARY DISEASE, UNSPECIFIED COPD TYPE: ICD-10-CM

## 2024-07-31 PROCEDURE — 99215 OFFICE O/P EST HI 40 MIN: CPT | Mod: HCNC,S$GLB,, | Performed by: NURSE PRACTITIONER

## 2024-07-31 PROCEDURE — 1160F RVW MEDS BY RX/DR IN RCRD: CPT | Mod: HCNC,CPTII,S$GLB, | Performed by: NURSE PRACTITIONER

## 2024-07-31 PROCEDURE — 3077F SYST BP >= 140 MM HG: CPT | Mod: HCNC,CPTII,S$GLB, | Performed by: NURSE PRACTITIONER

## 2024-07-31 PROCEDURE — G2211 COMPLEX E/M VISIT ADD ON: HCPCS | Mod: HCNC,S$GLB,, | Performed by: NURSE PRACTITIONER

## 2024-07-31 PROCEDURE — 1126F AMNT PAIN NOTED NONE PRSNT: CPT | Mod: HCNC,CPTII,S$GLB, | Performed by: NURSE PRACTITIONER

## 2024-07-31 PROCEDURE — 3078F DIAST BP <80 MM HG: CPT | Mod: HCNC,CPTII,S$GLB, | Performed by: NURSE PRACTITIONER

## 2024-07-31 PROCEDURE — 99999 PR PBB SHADOW E&M-EST. PATIENT-LVL IV: CPT | Mod: PBBFAC,HCNC,, | Performed by: NURSE PRACTITIONER

## 2024-07-31 PROCEDURE — 1157F ADVNC CARE PLAN IN RCRD: CPT | Mod: HCNC,CPTII,S$GLB, | Performed by: NURSE PRACTITIONER

## 2024-07-31 PROCEDURE — 1101F PT FALLS ASSESS-DOCD LE1/YR: CPT | Mod: HCNC,CPTII,S$GLB, | Performed by: NURSE PRACTITIONER

## 2024-07-31 PROCEDURE — 3288F FALL RISK ASSESSMENT DOCD: CPT | Mod: HCNC,CPTII,S$GLB, | Performed by: NURSE PRACTITIONER

## 2024-07-31 PROCEDURE — 1159F MED LIST DOCD IN RCRD: CPT | Mod: HCNC,CPTII,S$GLB, | Performed by: NURSE PRACTITIONER

## 2024-08-07 ENCOUNTER — LAB VISIT (OUTPATIENT)
Dept: LAB | Facility: HOSPITAL | Age: 84
End: 2024-08-07
Attending: NURSE PRACTITIONER
Payer: MEDICARE

## 2024-08-07 DIAGNOSIS — D63.0 ANEMIA IN NEOPLASTIC DISEASE: ICD-10-CM

## 2024-08-07 DIAGNOSIS — C34.31 MALIGNANT NEOPLASM OF LOWER LOBE OF RIGHT LUNG: ICD-10-CM

## 2024-08-07 LAB
ALBUMIN SERPL BCP-MCNC: 3.1 G/DL (ref 3.5–5.2)
ALP SERPL-CCNC: 48 U/L (ref 55–135)
ALT SERPL W/O P-5'-P-CCNC: 12 U/L (ref 10–44)
ANION GAP SERPL CALC-SCNC: 8 MMOL/L (ref 8–16)
AST SERPL-CCNC: 12 U/L (ref 10–40)
BASOPHILS # BLD AUTO: 0.01 K/UL (ref 0–0.2)
BASOPHILS NFR BLD: 0.3 % (ref 0–1.9)
BILIRUB SERPL-MCNC: 0.3 MG/DL (ref 0.1–1)
BUN SERPL-MCNC: 22 MG/DL (ref 8–23)
CALCIUM SERPL-MCNC: 8.5 MG/DL (ref 8.7–10.5)
CHLORIDE SERPL-SCNC: 103 MMOL/L (ref 95–110)
CO2 SERPL-SCNC: 29 MMOL/L (ref 23–29)
CREAT SERPL-MCNC: 1.1 MG/DL (ref 0.5–1.4)
DIFFERENTIAL METHOD BLD: ABNORMAL
EOSINOPHIL # BLD AUTO: 0 K/UL (ref 0–0.5)
EOSINOPHIL NFR BLD: 0.3 % (ref 0–8)
ERYTHROCYTE [DISTWIDTH] IN BLOOD BY AUTOMATED COUNT: 15 % (ref 11.5–14.5)
EST. GFR  (NO RACE VARIABLE): 49.9 ML/MIN/1.73 M^2
GLUCOSE SERPL-MCNC: 130 MG/DL (ref 70–110)
HCT VFR BLD AUTO: 29.4 % (ref 37–48.5)
HGB BLD-MCNC: 8.8 G/DL (ref 12–16)
IMM GRANULOCYTES # BLD AUTO: 0.04 K/UL (ref 0–0.04)
IMM GRANULOCYTES NFR BLD AUTO: 1.3 % (ref 0–0.5)
LYMPHOCYTES # BLD AUTO: 0.5 K/UL (ref 1–4.8)
LYMPHOCYTES NFR BLD: 15.8 % (ref 18–48)
MAGNESIUM SERPL-MCNC: 1.7 MG/DL (ref 1.6–2.6)
MCH RBC QN AUTO: 29.2 PG (ref 27–31)
MCHC RBC AUTO-ENTMCNC: 29.9 G/DL (ref 32–36)
MCV RBC AUTO: 98 FL (ref 82–98)
MONOCYTES # BLD AUTO: 0.7 K/UL (ref 0.3–1)
MONOCYTES NFR BLD: 23.8 % (ref 4–15)
NEUTROPHILS # BLD AUTO: 1.7 K/UL (ref 1.8–7.7)
NEUTROPHILS NFR BLD: 58.5 % (ref 38–73)
NRBC BLD-RTO: 0 /100 WBC
PLATELET # BLD AUTO: 142 K/UL (ref 150–450)
PMV BLD AUTO: 9.1 FL (ref 9.2–12.9)
POTASSIUM SERPL-SCNC: 3.8 MMOL/L (ref 3.5–5.1)
PROT SERPL-MCNC: 6.5 G/DL (ref 6–8.4)
RBC # BLD AUTO: 3.01 M/UL (ref 4–5.4)
SODIUM SERPL-SCNC: 140 MMOL/L (ref 136–145)
WBC # BLD AUTO: 2.98 K/UL (ref 3.9–12.7)

## 2024-08-07 PROCEDURE — 80053 COMPREHEN METABOLIC PANEL: CPT | Performed by: NURSE PRACTITIONER

## 2024-08-07 PROCEDURE — 36415 COLL VENOUS BLD VENIPUNCTURE: CPT | Performed by: NURSE PRACTITIONER

## 2024-08-07 PROCEDURE — 85025 COMPLETE CBC W/AUTO DIFF WBC: CPT | Performed by: NURSE PRACTITIONER

## 2024-08-07 PROCEDURE — 83735 ASSAY OF MAGNESIUM: CPT | Performed by: NURSE PRACTITIONER

## 2024-08-15 ENCOUNTER — LAB VISIT (OUTPATIENT)
Dept: LAB | Facility: HOSPITAL | Age: 84
End: 2024-08-15
Attending: NURSE PRACTITIONER
Payer: MEDICARE

## 2024-08-15 DIAGNOSIS — D63.0 ANEMIA IN NEOPLASTIC DISEASE: ICD-10-CM

## 2024-08-15 DIAGNOSIS — C34.31 MALIGNANT NEOPLASM OF LOWER LOBE OF RIGHT LUNG: ICD-10-CM

## 2024-08-15 LAB
ALBUMIN SERPL BCP-MCNC: 3.2 G/DL (ref 3.5–5.2)
ALP SERPL-CCNC: 57 U/L (ref 55–135)
ALT SERPL W/O P-5'-P-CCNC: 16 U/L (ref 10–44)
ANION GAP SERPL CALC-SCNC: 9 MMOL/L (ref 8–16)
AST SERPL-CCNC: 16 U/L (ref 10–40)
BASOPHILS # BLD AUTO: 0.01 K/UL (ref 0–0.2)
BASOPHILS NFR BLD: 0.1 % (ref 0–1.9)
BILIRUB SERPL-MCNC: 0.3 MG/DL (ref 0.1–1)
BUN SERPL-MCNC: 23 MG/DL (ref 8–23)
CALCIUM SERPL-MCNC: 9 MG/DL (ref 8.7–10.5)
CHLORIDE SERPL-SCNC: 104 MMOL/L (ref 95–110)
CO2 SERPL-SCNC: 28 MMOL/L (ref 23–29)
CREAT SERPL-MCNC: 1.1 MG/DL (ref 0.5–1.4)
DIFFERENTIAL METHOD BLD: ABNORMAL
EOSINOPHIL # BLD AUTO: 0 K/UL (ref 0–0.5)
EOSINOPHIL NFR BLD: 0.3 % (ref 0–8)
ERYTHROCYTE [DISTWIDTH] IN BLOOD BY AUTOMATED COUNT: 16.6 % (ref 11.5–14.5)
EST. GFR  (NO RACE VARIABLE): 49.9 ML/MIN/1.73 M^2
GLUCOSE SERPL-MCNC: 123 MG/DL (ref 70–110)
HCT VFR BLD AUTO: 31.8 % (ref 37–48.5)
HGB BLD-MCNC: 9.5 G/DL (ref 12–16)
IMM GRANULOCYTES # BLD AUTO: 0.12 K/UL (ref 0–0.04)
IMM GRANULOCYTES NFR BLD AUTO: 1 % (ref 0–0.5)
LYMPHOCYTES # BLD AUTO: 0.8 K/UL (ref 1–4.8)
LYMPHOCYTES NFR BLD: 6.8 % (ref 18–48)
MAGNESIUM SERPL-MCNC: 1.8 MG/DL (ref 1.6–2.6)
MCH RBC QN AUTO: 29.4 PG (ref 27–31)
MCHC RBC AUTO-ENTMCNC: 29.9 G/DL (ref 32–36)
MCV RBC AUTO: 99 FL (ref 82–98)
MONOCYTES # BLD AUTO: 1.1 K/UL (ref 0.3–1)
MONOCYTES NFR BLD: 9.6 % (ref 4–15)
NEUTROPHILS # BLD AUTO: 9.6 K/UL (ref 1.8–7.7)
NEUTROPHILS NFR BLD: 82.2 % (ref 38–73)
NRBC BLD-RTO: 0 /100 WBC
PLATELET # BLD AUTO: 323 K/UL (ref 150–450)
PMV BLD AUTO: 9 FL (ref 9.2–12.9)
POTASSIUM SERPL-SCNC: 4.3 MMOL/L (ref 3.5–5.1)
PROT SERPL-MCNC: 7.3 G/DL (ref 6–8.4)
RBC # BLD AUTO: 3.23 M/UL (ref 4–5.4)
SODIUM SERPL-SCNC: 141 MMOL/L (ref 136–145)
WBC # BLD AUTO: 11.62 K/UL (ref 3.9–12.7)

## 2024-08-15 PROCEDURE — 36415 COLL VENOUS BLD VENIPUNCTURE: CPT | Performed by: NURSE PRACTITIONER

## 2024-08-15 PROCEDURE — 83735 ASSAY OF MAGNESIUM: CPT | Performed by: NURSE PRACTITIONER

## 2024-08-15 PROCEDURE — 85025 COMPLETE CBC W/AUTO DIFF WBC: CPT | Performed by: NURSE PRACTITIONER

## 2024-08-15 PROCEDURE — 80053 COMPREHEN METABOLIC PANEL: CPT | Performed by: NURSE PRACTITIONER

## 2024-08-16 RX ORDER — HEPARIN 100 UNIT/ML
500 SYRINGE INTRAVENOUS
OUTPATIENT
Start: 2024-08-19

## 2024-08-16 RX ORDER — CYANOCOBALAMIN 1000 UG/ML
1000 INJECTION, SOLUTION INTRAMUSCULAR; SUBCUTANEOUS
OUTPATIENT
Start: 2024-08-19

## 2024-08-16 RX ORDER — SODIUM CHLORIDE 0.9 % (FLUSH) 0.9 %
10 SYRINGE (ML) INJECTION
OUTPATIENT
Start: 2024-08-19

## 2024-08-19 ENCOUNTER — INFUSION (OUTPATIENT)
Dept: INFUSION THERAPY | Facility: HOSPITAL | Age: 84
End: 2024-08-19
Attending: INTERNAL MEDICINE
Payer: MEDICARE

## 2024-08-19 VITALS
OXYGEN SATURATION: 100 % | WEIGHT: 117.38 LBS | TEMPERATURE: 98 F | RESPIRATION RATE: 18 BRPM | DIASTOLIC BLOOD PRESSURE: 55 MMHG | BODY MASS INDEX: 20.8 KG/M2 | HEART RATE: 81 BPM | HEIGHT: 63 IN | SYSTOLIC BLOOD PRESSURE: 130 MMHG

## 2024-08-19 DIAGNOSIS — C34.31 PRIMARY MALIGNANT NEOPLASM OF BRONCHUS OF RIGHT LOWER LOBE: Primary | ICD-10-CM

## 2024-08-19 PROCEDURE — 63600175 PHARM REV CODE 636 W HCPCS: Performed by: INTERNAL MEDICINE

## 2024-08-19 PROCEDURE — 96372 THER/PROPH/DIAG INJ SC/IM: CPT | Mod: 59

## 2024-08-19 PROCEDURE — 25000003 PHARM REV CODE 250: Performed by: INTERNAL MEDICINE

## 2024-08-19 PROCEDURE — 96409 CHEMO IV PUSH SNGL DRUG: CPT

## 2024-08-19 PROCEDURE — 96375 TX/PRO/DX INJ NEW DRUG ADDON: CPT

## 2024-08-19 PROCEDURE — 96367 TX/PROPH/DG ADDL SEQ IV INF: CPT

## 2024-08-19 RX ORDER — SODIUM CHLORIDE 0.9 % (FLUSH) 0.9 %
10 SYRINGE (ML) INJECTION
Status: DISCONTINUED | OUTPATIENT
Start: 2024-08-19 | End: 2024-08-19 | Stop reason: HOSPADM

## 2024-08-19 RX ORDER — HEPARIN 100 UNIT/ML
500 SYRINGE INTRAVENOUS
Status: DISCONTINUED | OUTPATIENT
Start: 2024-08-19 | End: 2024-08-19 | Stop reason: HOSPADM

## 2024-08-19 RX ORDER — CYANOCOBALAMIN 1000 UG/ML
1000 INJECTION, SOLUTION INTRAMUSCULAR; SUBCUTANEOUS
Status: COMPLETED | OUTPATIENT
Start: 2024-08-19 | End: 2024-08-19

## 2024-08-19 RX ADMIN — PEMETREXED DISODIUM 575 MG: 500 INJECTION, POWDER, LYOPHILIZED, FOR SOLUTION INTRAVENOUS at 02:08

## 2024-08-19 RX ADMIN — PALONOSETRON HYDROCHLORIDE 0.25 MG: 0.25 INJECTION INTRAVENOUS at 02:08

## 2024-08-19 RX ADMIN — HEPARIN 500 UNITS: 100 SYRINGE at 02:08

## 2024-08-19 RX ADMIN — APREPITANT 130 MG: 130 INJECTION, EMULSION INTRAVENOUS at 02:08

## 2024-08-19 RX ADMIN — CYANOCOBALAMIN 1000 MCG: 1000 INJECTION, SOLUTION INTRAMUSCULAR at 02:08

## 2024-08-19 NOTE — PLAN OF CARE
Problem: Fatigue  Goal: Improved Activity Tolerance  Outcome: Progressing  Intervention: Promote Improved Energy  Flowsheets (Taken 8/19/2024 4778)  Fatigue Management:   fatigue-related activity identified   paced activity encouraged   frequent rest breaks encouraged  Sleep/Rest Enhancement:   noise level reduced   relaxation techniques promoted   regular sleep/rest pattern promoted  Activity Management:   Ambulated -L4   Up in chair - L3  Environmental Support:   distractions minimized   calm environment promoted   rest periods encouraged

## 2024-08-21 ENCOUNTER — OFFICE VISIT (OUTPATIENT)
Facility: CLINIC | Age: 84
End: 2024-08-21
Payer: MEDICARE

## 2024-08-21 ENCOUNTER — LAB VISIT (OUTPATIENT)
Dept: LAB | Facility: HOSPITAL | Age: 84
End: 2024-08-21
Attending: NURSE PRACTITIONER
Payer: MEDICARE

## 2024-08-21 VITALS
SYSTOLIC BLOOD PRESSURE: 147 MMHG | BODY MASS INDEX: 20.99 KG/M2 | DIASTOLIC BLOOD PRESSURE: 68 MMHG | HEART RATE: 80 BPM | RESPIRATION RATE: 18 BRPM | TEMPERATURE: 98 F | WEIGHT: 118.5 LBS

## 2024-08-21 DIAGNOSIS — J44.9 CHRONIC OBSTRUCTIVE PULMONARY DISEASE, UNSPECIFIED COPD TYPE: ICD-10-CM

## 2024-08-21 DIAGNOSIS — C34.31 MALIGNANT NEOPLASM OF LOWER LOBE OF RIGHT LUNG: ICD-10-CM

## 2024-08-21 DIAGNOSIS — C34.31 PRIMARY MALIGNANT NEOPLASM OF BRONCHUS OF RIGHT LOWER LOBE: Primary | ICD-10-CM

## 2024-08-21 DIAGNOSIS — D63.0 ANEMIA IN NEOPLASTIC DISEASE: ICD-10-CM

## 2024-08-21 LAB
ALBUMIN SERPL BCP-MCNC: 3.1 G/DL (ref 3.5–5.2)
ALP SERPL-CCNC: 51 U/L (ref 55–135)
ALT SERPL W/O P-5'-P-CCNC: 14 U/L (ref 10–44)
ANION GAP SERPL CALC-SCNC: 9 MMOL/L (ref 8–16)
AST SERPL-CCNC: 16 U/L (ref 10–40)
BASOPHILS # BLD AUTO: 0.03 K/UL (ref 0–0.2)
BASOPHILS NFR BLD: 0.2 % (ref 0–1.9)
BILIRUB SERPL-MCNC: 0.3 MG/DL (ref 0.1–1)
BUN SERPL-MCNC: 32 MG/DL (ref 8–23)
CALCIUM SERPL-MCNC: 8.8 MG/DL (ref 8.7–10.5)
CHLORIDE SERPL-SCNC: 102 MMOL/L (ref 95–110)
CO2 SERPL-SCNC: 29 MMOL/L (ref 23–29)
CREAT SERPL-MCNC: 1.2 MG/DL (ref 0.5–1.4)
DIFFERENTIAL METHOD BLD: ABNORMAL
EOSINOPHIL # BLD AUTO: 0 K/UL (ref 0–0.5)
EOSINOPHIL NFR BLD: 0.3 % (ref 0–8)
ERYTHROCYTE [DISTWIDTH] IN BLOOD BY AUTOMATED COUNT: 17.4 % (ref 11.5–14.5)
EST. GFR  (NO RACE VARIABLE): 44.9 ML/MIN/1.73 M^2
GLUCOSE SERPL-MCNC: 114 MG/DL (ref 70–110)
HCT VFR BLD AUTO: 30.3 % (ref 37–48.5)
HGB BLD-MCNC: 9 G/DL (ref 12–16)
IMM GRANULOCYTES # BLD AUTO: 0.05 K/UL (ref 0–0.04)
IMM GRANULOCYTES NFR BLD AUTO: 0.4 % (ref 0–0.5)
LYMPHOCYTES # BLD AUTO: 0.9 K/UL (ref 1–4.8)
LYMPHOCYTES NFR BLD: 6.7 % (ref 18–48)
MAGNESIUM SERPL-MCNC: 1.9 MG/DL (ref 1.6–2.6)
MCH RBC QN AUTO: 29.4 PG (ref 27–31)
MCHC RBC AUTO-ENTMCNC: 29.7 G/DL (ref 32–36)
MCV RBC AUTO: 99 FL (ref 82–98)
MONOCYTES # BLD AUTO: 0.9 K/UL (ref 0.3–1)
MONOCYTES NFR BLD: 7.1 % (ref 4–15)
NEUTROPHILS # BLD AUTO: 11.4 K/UL (ref 1.8–7.7)
NEUTROPHILS NFR BLD: 85.3 % (ref 38–73)
NRBC BLD-RTO: 0 /100 WBC
PLATELET # BLD AUTO: 429 K/UL (ref 150–450)
PMV BLD AUTO: 8.9 FL (ref 9.2–12.9)
POTASSIUM SERPL-SCNC: 3.8 MMOL/L (ref 3.5–5.1)
PROT SERPL-MCNC: 6.8 G/DL (ref 6–8.4)
RBC # BLD AUTO: 3.06 M/UL (ref 4–5.4)
SODIUM SERPL-SCNC: 140 MMOL/L (ref 136–145)
WBC # BLD AUTO: 13.33 K/UL (ref 3.9–12.7)

## 2024-08-21 PROCEDURE — 99999 PR PBB SHADOW E&M-EST. PATIENT-LVL III: CPT | Mod: PBBFAC,HCNC,, | Performed by: INTERNAL MEDICINE

## 2024-08-21 PROCEDURE — 1159F MED LIST DOCD IN RCRD: CPT | Mod: HCNC,CPTII,S$GLB, | Performed by: INTERNAL MEDICINE

## 2024-08-21 PROCEDURE — 36415 COLL VENOUS BLD VENIPUNCTURE: CPT | Performed by: NURSE PRACTITIONER

## 2024-08-21 PROCEDURE — 1157F ADVNC CARE PLAN IN RCRD: CPT | Mod: HCNC,CPTII,S$GLB, | Performed by: INTERNAL MEDICINE

## 2024-08-21 PROCEDURE — 3077F SYST BP >= 140 MM HG: CPT | Mod: HCNC,CPTII,S$GLB, | Performed by: INTERNAL MEDICINE

## 2024-08-21 PROCEDURE — G2211 COMPLEX E/M VISIT ADD ON: HCPCS | Mod: HCNC,S$GLB,, | Performed by: INTERNAL MEDICINE

## 2024-08-21 PROCEDURE — 83735 ASSAY OF MAGNESIUM: CPT | Performed by: NURSE PRACTITIONER

## 2024-08-21 PROCEDURE — 1101F PT FALLS ASSESS-DOCD LE1/YR: CPT | Mod: HCNC,CPTII,S$GLB, | Performed by: INTERNAL MEDICINE

## 2024-08-21 PROCEDURE — 80053 COMPREHEN METABOLIC PANEL: CPT | Performed by: NURSE PRACTITIONER

## 2024-08-21 PROCEDURE — 99215 OFFICE O/P EST HI 40 MIN: CPT | Mod: HCNC,S$GLB,, | Performed by: INTERNAL MEDICINE

## 2024-08-21 PROCEDURE — 1126F AMNT PAIN NOTED NONE PRSNT: CPT | Mod: HCNC,CPTII,S$GLB, | Performed by: INTERNAL MEDICINE

## 2024-08-21 PROCEDURE — 1160F RVW MEDS BY RX/DR IN RCRD: CPT | Mod: HCNC,CPTII,S$GLB, | Performed by: INTERNAL MEDICINE

## 2024-08-21 PROCEDURE — 3288F FALL RISK ASSESSMENT DOCD: CPT | Mod: HCNC,CPTII,S$GLB, | Performed by: INTERNAL MEDICINE

## 2024-08-21 PROCEDURE — 85025 COMPLETE CBC W/AUTO DIFF WBC: CPT | Performed by: NURSE PRACTITIONER

## 2024-08-21 PROCEDURE — 3078F DIAST BP <80 MM HG: CPT | Mod: HCNC,CPTII,S$GLB, | Performed by: INTERNAL MEDICINE

## 2024-08-21 NOTE — ASSESSMENT & PLAN NOTE
She is doing ok at this time.  Is chronically O2 dependent but stable.  Need to monitor this closely given complexity of treatment and health issues.

## 2024-08-21 NOTE — ASSESSMENT & PLAN NOTE
Patient is doing well and remains on the Alimta maintenance.  She will be due for imaging and PET has been ordered.  Her labs look ok and will continue aggressive follow up of labs and visits.

## 2024-08-21 NOTE — PROGRESS NOTES
PROGRESS NOTE    Subjective:       Patient ID: Alexa Otero is a 84 y.o. female.    9/21/2020  Bilateral oophorectomy  Right endometrioid Carcinoma  [id number]  S/p Carbo/Taxol x 6    8/15/2023 PET:  IMPRESSION:  1. Multifocal right lower lobe and right pleural FDG hypermetabolism as described, with associated right lower lobe consolidation. These are nonspecific and could be of infectious or inflammatory etiology in the clinical setting of chronic pneumonia, and or metastatic disease. Correlation with previous bronchoscopy results is needed.  2. Multiple new non hypermetabolic pulmonary nodules in both lungs, which are generally below size resolution for PET, but suspicious for pulmonary metastases.  3. No additional findings of FDG avid metastatic disease.    10/31/2023 RLL Lobectomy:  LUNG SYNOPTIC REPORT   PROCEDURE:     Lobectomy.   SPECIMEN LATERALITY:    Right   TUMOR SITE:     Lower lobe.   TUMOR SIZE:     Cannot be determined, 4.5 cm area of cavitation but    tumor appears to involve most if not all of the resected lobe.   TUMOR FOCALITY:    Single tumor.   HISTOLOGIC TYPE:    Invasive mucinous adenocarcinoma with pneumonic    pattern.   VISCEROPLEURAL INVASION:   Present   LYMPHOVASCULAR INVASION:   Not identified.   SPREAD OF TUMOR THROUGH AIR SPACES:  Present   DIRECT INVASION OF ADJACENT STRUCTURES: No adjacent structures present.   MARGINS:     All margins are uninvolved by tumor, margins examined:         Bronchial, distance of invasion of tumor from closest         margin: cannot be determined.   TREATMENT EFFECT:    No known presurgical therapy.   ADDITIONAL PATHOLOGIC FINDINGS:  Organized pulmonary emboli, pulmonary    infarct.   REGIONAL LYMPH NODES:    NUMBER OF LYMPH NODES INVOLVED:  Zero, number of lymph nodes examined:     1, yue         structures examined: 10 (hilar)   PATHOLOGIC STAGE CLASSIFICATION    OF PRIMARY TUMOR:    pT3     REGIONAL LYMPH NODES:   pN0     Comment: The sections show invasive mucinous adenocarcinoma with    findings that suggest so-called pneumonic pattern. there is diffuse    involvement with tumor present in all the histologic sections, often    with a lepidic pattern suggesting spread through the air spaces. The    reported CT findings of a consolidated appearance correlates with the    histologic findings and the reported clinical presentation of a    year-long cough productive of purulent mucus. Mucus is also a common    presentation of this somewhat uncommon pulmonary malignancy. These    tumors are two complete stages pT3 when there appears to be involvement    of the entire lobe; this appears to be appropriate in this case. There    are also organizing/organized pulmonary emboli within the vasculature,    possibly indicating hypercoagulability of malignancy as can be seen    especially with mucinous tumors. The tissue sampled in block 2F is    likely an infarct related to this. The history of endometrioid    adenocarcinoma is noted, but the current tumor represents a primary    lung malignancy and is unrelated to the prior cancer.     10/13/2023-Echo:  Normal systolic function with EF 55-60%  Grade I diastolic dysfunction    12/10/2023-CT CAP:  Progression of the alveolar consolidation in the right mid and lower lung with moderate size right pleural effusion. There is progression of the airspace disease within the right upper lobe with patchy groundglass opacity left upper lobe and left lung base. Findings are compatible with multifocal.     12/15/2023-Bronchoscopy:  LUNG, RIGHT MIDDLE LOBE, BIOPSY:   - ATYPICAL ADENOMATOUS HYPERPLASIA.   - IN A BACKGROUND OF REACTIVE FIBROSIS AND FIBRIN.     Comment:   Given the patient's history, this lesion is concerning for well    differentiated lepidic type adenocarcinoma but is quantitatively    insufficient (approximately 1 mm) for a definite diagnosis.     Multiple  additional leveled sections were examined.     Carbo/Pemetrexed x 4  1/25/2024-4/11/2024    Maintenance Pemetrexed:  Cycle 6: 7/8/2024  Cycle 7: 8/19/2024  Cycle 8: 9/9/2024-due    4/23/2024-PET:  1. Interval improvement in hypermetabolic right lung airspace opacities, presumably reflecting infectious or inflammatory etiology.  2. No convincing evidence of recurrent neoplasm or metastatic disease.  3. Interval increased size of right pleural effusion.  4. Additional stable observations as described.      Chief Complaint:  No chief complaint on file.  Stage IV Lung cancer follow up    History of Present Illness:   Alexa Otero is a 84 y.o. female who presents for follow up of lung cancer.      Patient is doing ok today.  Remains on Alimta maintenance.  Feels like she tolerates this ok.      Pet scheduled for 10/1/2024.     Family and Social history reviewed and is unchanged from 12/1/2023             Current Outpatient Medications:     aspirin (ECOTRIN) 81 MG EC tablet, Take 81 mg by mouth once daily., Disp: , Rfl:     benazepriL (LOTENSIN) 40 MG tablet, Take 0.5 tablets (20 mg total) by mouth once daily., Disp: 45 tablet, Rfl: 3    clopidogreL (PLAVIX) 75 mg tablet, Take 1 tablet (75 mg total) by mouth once daily., Disp: 90 tablet, Rfl: 2    folic acid (FOLVITE) 1 MG tablet, Take 1 tablet (1 mg total) by mouth once daily., Disp: 100 tablet, Rfl: 3    gabapentin (NEURONTIN) 100 MG capsule, Take 1 capsule (100 mg total) by mouth 2 (two) times daily., Disp: 180 capsule, Rfl: 3    HYDROcodone-acetaminophen (NORCO) 5-325 mg per tablet, Take 1 tablet by mouth every 6 (six) hours as needed for Pain. (Patient not taking: Reported on 7/11/2024), Disp: , Rfl:     LORazepam (ATIVAN) 1 MG tablet, Take 1 tablet (1 mg total) by mouth every 6 (six) hours as needed for Anxiety (insomnia)., Disp: 30 tablet, Rfl: 2    magnesium oxide (MAG-OX) 400 mg (241.3 mg magnesium) tablet, Take 1 tablet (400 mg total) by mouth once daily.  Patient requested refill, Disp: 90 tablet, Rfl: 3    melatonin 5 mg Chew, Take 1 tablet by mouth nightly as needed (insomnia)., Disp: , Rfl:     metoprolol succinate (TOPROL-XL) 25 MG 24 hr tablet, Take 1 tablet (25 mg total) by mouth once daily., Disp: 90 tablet, Rfl: 3    ondansetron (ZOFRAN) 8 MG tablet, Take 1 tablet (8 mg total) by mouth every 8 (eight) hours as needed., Disp: 30 tablet, Rfl: 5    potassium chloride (K-TAB) 20 mEq, Take 1 tablet by mouth once daily., Disp: , Rfl:     potassium chloride SA (K-DUR,KLOR-CON) 20 MEQ tablet, Take 1 tablet daily, Disp: 90 tablet, Rfl: 1    promethazine (PHENERGAN) 25 MG tablet, Take 1 tablet (25 mg total) by mouth every 4 (four) hours as needed for Nausea., Disp: 30 tablet, Rfl: 5    VIT A/VIT C/VIT E/ZINC/COPPER (ICAPS AREDS ORAL), Take 1 capsule by mouth 2 (two) times a day. , Disp: , Rfl:     Current Facility-Administered Medications:     diphenhydrAMINE injection 25 mg, 25 mg, Intravenous, Once, Patito Gibson NP-C        Objective:       Physical Examination:     BP (!) 147/68   Pulse 80   Temp 97.5 °F (36.4 °C)   Resp 18   Wt 53.8 kg (118 lb 8 oz)   BMI 20.99 kg/m²     Physical Exam  Constitutional:       Appearance: Normal appearance.   HENT:      Head: Normocephalic and atraumatic.   Eyes:      General: No scleral icterus.     Conjunctiva/sclera: Conjunctivae normal.   Cardiovascular:      Rate and Rhythm: Normal rate.   Pulmonary:      Effort: Pulmonary effort is normal.   Abdominal:      General: Abdomen is flat.   Neurological:      General: No focal deficit present.      Mental Status: She is alert and oriented to person, place, and time.   Psychiatric:         Mood and Affect: Mood normal.         Behavior: Behavior normal.         Thought Content: Thought content normal.         Judgment: Judgment normal.         Labs:   Recent Results (from the past 336 hour(s))   CBC W/ AUTO DIFFERENTIAL    Collection Time: 08/21/24  9:59 AM   Result Value Ref  Range    WBC 13.33 (H) 3.90 - 12.70 K/uL    Hemoglobin 9.0 (L) 12.0 - 16.0 g/dL    Hematocrit 30.3 (L) 37.0 - 48.5 %    Platelets 429 150 - 450 K/uL   CBC W/ AUTO DIFFERENTIAL    Collection Time: 08/15/24 11:08 AM   Result Value Ref Range    WBC 11.62 3.90 - 12.70 K/uL    Hemoglobin 9.5 (L) 12.0 - 16.0 g/dL    Hematocrit 31.8 (L) 37.0 - 48.5 %    Platelets 323 150 - 450 K/uL   CBC W/ AUTO DIFFERENTIAL    Collection Time: 08/07/24 10:59 AM   Result Value Ref Range    WBC 2.98 (L) 3.90 - 12.70 K/uL    Hemoglobin 8.8 (L) 12.0 - 16.0 g/dL    Hematocrit 29.4 (L) 37.0 - 48.5 %    Platelets 142 (L) 150 - 450 K/uL     CMP  Sodium   Date Value Ref Range Status   08/15/2024 141 136 - 145 mmol/L Final     Potassium   Date Value Ref Range Status   08/15/2024 4.3 3.5 - 5.1 mmol/L Final     Chloride   Date Value Ref Range Status   08/15/2024 104 95 - 110 mmol/L Final     CO2   Date Value Ref Range Status   08/15/2024 28 23 - 29 mmol/L Final     Glucose   Date Value Ref Range Status   08/15/2024 123 (H) 70 - 110 mg/dL Final     BUN   Date Value Ref Range Status   08/15/2024 23 8 - 23 mg/dL Final     Creatinine   Date Value Ref Range Status   08/15/2024 1.1 0.5 - 1.4 mg/dL Final     Calcium   Date Value Ref Range Status   08/15/2024 9.0 8.7 - 10.5 mg/dL Final     Total Protein   Date Value Ref Range Status   08/15/2024 7.3 6.0 - 8.4 g/dL Final     Albumin   Date Value Ref Range Status   08/15/2024 3.2 (L) 3.5 - 5.2 g/dL Final     Total Bilirubin   Date Value Ref Range Status   08/15/2024 0.3 0.1 - 1.0 mg/dL Final     Comment:     For infants and newborns, interpretation of results should be based  on gestational age, weight and in agreement with clinical  observations.    Premature Infant recommended reference ranges:  Up to 24 hours.............<8.0 mg/dL  Up to 48 hours............<12.0 mg/dL  3-5 days..................<15.0 mg/dL  6-29 days.................<15.0 mg/dL       Alkaline Phosphatase   Date Value Ref Range Status  "  08/15/2024 57 55 - 135 U/L Final     AST   Date Value Ref Range Status   08/15/2024 16 10 - 40 U/L Final     ALT   Date Value Ref Range Status   08/15/2024 16 10 - 44 U/L Final     Anion Gap   Date Value Ref Range Status   08/15/2024 9 8 - 16 mmol/L Final     eGFR if    Date Value Ref Range Status   04/28/2022 40.6 (A) >60 mL/min/1.73 m^2 Final     eGFR if non    Date Value Ref Range Status   04/28/2022 35.3 (A) >60 mL/min/1.73 m^2 Final     Comment:     Calculation used to obtain the estimated glomerular filtration  rate (eGFR) is the CKD-EPI equation.        Lab Results   Component Value Date    CEA 1.3 07/23/2020     No results found for: "PSA"        Assessment/Plan:     Problem List Items Addressed This Visit       Primary malignant neoplasm of bronchus of right lower lobe - Primary     Patient is doing well and remains on the Alimta maintenance.  She will be due for imaging and PET has been ordered.  Her labs look ok and will continue aggressive follow up of labs and visits.           Chronic obstructive pulmonary disease, unspecified COPD type     She is doing ok at this time.  Is chronically O2 dependent but stable.  Need to monitor this closely given complexity of treatment and health issues.                   Discussion:     Follow up in about 6 weeks (around 10/2/2024).      Electronically signed by Virgil Lloyd      "

## 2024-08-28 ENCOUNTER — LAB VISIT (OUTPATIENT)
Dept: LAB | Facility: HOSPITAL | Age: 84
End: 2024-08-28
Attending: NURSE PRACTITIONER
Payer: MEDICARE

## 2024-08-28 DIAGNOSIS — C34.31 MALIGNANT NEOPLASM OF LOWER LOBE OF RIGHT LUNG: ICD-10-CM

## 2024-08-28 DIAGNOSIS — D63.0 ANEMIA IN NEOPLASTIC DISEASE: ICD-10-CM

## 2024-08-28 LAB
ALBUMIN SERPL BCP-MCNC: 3.1 G/DL (ref 3.5–5.2)
ALP SERPL-CCNC: 47 U/L (ref 55–135)
ALT SERPL W/O P-5'-P-CCNC: 21 U/L (ref 10–44)
ANION GAP SERPL CALC-SCNC: 12 MMOL/L (ref 8–16)
AST SERPL-CCNC: 18 U/L (ref 10–40)
BASOPHILS # BLD AUTO: 0.02 K/UL (ref 0–0.2)
BASOPHILS NFR BLD: 0.5 % (ref 0–1.9)
BILIRUB SERPL-MCNC: 0.4 MG/DL (ref 0.1–1)
BUN SERPL-MCNC: 24 MG/DL (ref 8–23)
CALCIUM SERPL-MCNC: 8.8 MG/DL (ref 8.7–10.5)
CHLORIDE SERPL-SCNC: 102 MMOL/L (ref 95–110)
CO2 SERPL-SCNC: 26 MMOL/L (ref 23–29)
CREAT SERPL-MCNC: 1.1 MG/DL (ref 0.5–1.4)
DIFFERENTIAL METHOD BLD: ABNORMAL
EOSINOPHIL # BLD AUTO: 0 K/UL (ref 0–0.5)
EOSINOPHIL NFR BLD: 0.5 % (ref 0–8)
ERYTHROCYTE [DISTWIDTH] IN BLOOD BY AUTOMATED COUNT: 17 % (ref 11.5–14.5)
EST. GFR  (NO RACE VARIABLE): 49.9 ML/MIN/1.73 M^2
GLUCOSE SERPL-MCNC: 127 MG/DL (ref 70–110)
HCT VFR BLD AUTO: 28.4 % (ref 37–48.5)
HGB BLD-MCNC: 8.5 G/DL (ref 12–16)
IMM GRANULOCYTES # BLD AUTO: 0.05 K/UL (ref 0–0.04)
IMM GRANULOCYTES NFR BLD AUTO: 1.2 % (ref 0–0.5)
LYMPHOCYTES # BLD AUTO: 0.6 K/UL (ref 1–4.8)
LYMPHOCYTES NFR BLD: 14.1 % (ref 18–48)
MAGNESIUM SERPL-MCNC: 1.9 MG/DL (ref 1.6–2.6)
MCH RBC QN AUTO: 29.5 PG (ref 27–31)
MCHC RBC AUTO-ENTMCNC: 29.9 G/DL (ref 32–36)
MCV RBC AUTO: 99 FL (ref 82–98)
MONOCYTES # BLD AUTO: 0.9 K/UL (ref 0.3–1)
MONOCYTES NFR BLD: 22.2 % (ref 4–15)
NEUTROPHILS # BLD AUTO: 2.5 K/UL (ref 1.8–7.7)
NEUTROPHILS NFR BLD: 61.5 % (ref 38–73)
NRBC BLD-RTO: 0 /100 WBC
PLATELET # BLD AUTO: 166 K/UL (ref 150–450)
PMV BLD AUTO: 9.4 FL (ref 9.2–12.9)
POTASSIUM SERPL-SCNC: 4.3 MMOL/L (ref 3.5–5.1)
PROT SERPL-MCNC: 6.7 G/DL (ref 6–8.4)
RBC # BLD AUTO: 2.88 M/UL (ref 4–5.4)
SODIUM SERPL-SCNC: 140 MMOL/L (ref 136–145)
WBC # BLD AUTO: 4.05 K/UL (ref 3.9–12.7)

## 2024-08-28 PROCEDURE — 85025 COMPLETE CBC W/AUTO DIFF WBC: CPT | Performed by: NURSE PRACTITIONER

## 2024-08-28 PROCEDURE — 36415 COLL VENOUS BLD VENIPUNCTURE: CPT | Performed by: NURSE PRACTITIONER

## 2024-08-28 PROCEDURE — 80053 COMPREHEN METABOLIC PANEL: CPT | Performed by: NURSE PRACTITIONER

## 2024-08-28 PROCEDURE — 83735 ASSAY OF MAGNESIUM: CPT | Performed by: NURSE PRACTITIONER

## 2024-09-04 ENCOUNTER — LAB VISIT (OUTPATIENT)
Dept: LAB | Facility: HOSPITAL | Age: 84
End: 2024-09-04
Attending: NURSE PRACTITIONER
Payer: MEDICARE

## 2024-09-04 DIAGNOSIS — D63.0 ANEMIA IN NEOPLASTIC DISEASE: ICD-10-CM

## 2024-09-04 DIAGNOSIS — C34.31 MALIGNANT NEOPLASM OF LOWER LOBE OF RIGHT LUNG: ICD-10-CM

## 2024-09-04 LAB
ALBUMIN SERPL BCP-MCNC: 3.1 G/DL (ref 3.5–5.2)
ALP SERPL-CCNC: 56 U/L (ref 55–135)
ALT SERPL W/O P-5'-P-CCNC: 12 U/L (ref 10–44)
ANION GAP SERPL CALC-SCNC: 13 MMOL/L (ref 8–16)
AST SERPL-CCNC: 14 U/L (ref 10–40)
BASOPHILS # BLD AUTO: 0.01 K/UL (ref 0–0.2)
BASOPHILS NFR BLD: 0.1 % (ref 0–1.9)
BILIRUB SERPL-MCNC: 0.3 MG/DL (ref 0.1–1)
BUN SERPL-MCNC: 21 MG/DL (ref 8–23)
CALCIUM SERPL-MCNC: 8.7 MG/DL (ref 8.7–10.5)
CHLORIDE SERPL-SCNC: 103 MMOL/L (ref 95–110)
CO2 SERPL-SCNC: 24 MMOL/L (ref 23–29)
CREAT SERPL-MCNC: 1.1 MG/DL (ref 0.5–1.4)
DIFFERENTIAL METHOD BLD: ABNORMAL
EOSINOPHIL # BLD AUTO: 0 K/UL (ref 0–0.5)
EOSINOPHIL NFR BLD: 0.4 % (ref 0–8)
ERYTHROCYTE [DISTWIDTH] IN BLOOD BY AUTOMATED COUNT: 19 % (ref 11.5–14.5)
EST. GFR  (NO RACE VARIABLE): 49.9 ML/MIN/1.73 M^2
GLUCOSE SERPL-MCNC: 182 MG/DL (ref 70–110)
HCT VFR BLD AUTO: 28.3 % (ref 37–48.5)
HGB BLD-MCNC: 8.4 G/DL (ref 12–16)
IMM GRANULOCYTES # BLD AUTO: 0.09 K/UL (ref 0–0.04)
IMM GRANULOCYTES NFR BLD AUTO: 1 % (ref 0–0.5)
LYMPHOCYTES # BLD AUTO: 0.8 K/UL (ref 1–4.8)
LYMPHOCYTES NFR BLD: 8.3 % (ref 18–48)
MAGNESIUM SERPL-MCNC: 1.8 MG/DL (ref 1.6–2.6)
MCH RBC QN AUTO: 29.7 PG (ref 27–31)
MCHC RBC AUTO-ENTMCNC: 29.7 G/DL (ref 32–36)
MCV RBC AUTO: 100 FL (ref 82–98)
MONOCYTES # BLD AUTO: 1 K/UL (ref 0.3–1)
MONOCYTES NFR BLD: 10.8 % (ref 4–15)
NEUTROPHILS # BLD AUTO: 7.4 K/UL (ref 1.8–7.7)
NEUTROPHILS NFR BLD: 79.4 % (ref 38–73)
NRBC BLD-RTO: 0 /100 WBC
PLATELET # BLD AUTO: 316 K/UL (ref 150–450)
PMV BLD AUTO: 8.9 FL (ref 9.2–12.9)
POTASSIUM SERPL-SCNC: 4.1 MMOL/L (ref 3.5–5.1)
PROT SERPL-MCNC: 6.7 G/DL (ref 6–8.4)
RBC # BLD AUTO: 2.83 M/UL (ref 4–5.4)
SODIUM SERPL-SCNC: 140 MMOL/L (ref 136–145)
WBC # BLD AUTO: 9.29 K/UL (ref 3.9–12.7)

## 2024-09-04 PROCEDURE — 80053 COMPREHEN METABOLIC PANEL: CPT | Performed by: NURSE PRACTITIONER

## 2024-09-04 PROCEDURE — 36415 COLL VENOUS BLD VENIPUNCTURE: CPT | Performed by: NURSE PRACTITIONER

## 2024-09-04 PROCEDURE — 83735 ASSAY OF MAGNESIUM: CPT | Performed by: NURSE PRACTITIONER

## 2024-09-04 PROCEDURE — 85025 COMPLETE CBC W/AUTO DIFF WBC: CPT | Performed by: NURSE PRACTITIONER

## 2024-09-06 RX ORDER — SODIUM CHLORIDE 0.9 % (FLUSH) 0.9 %
10 SYRINGE (ML) INJECTION
OUTPATIENT
Start: 2024-09-09

## 2024-09-06 RX ORDER — HEPARIN 100 UNIT/ML
500 SYRINGE INTRAVENOUS
OUTPATIENT
Start: 2024-09-09

## 2024-09-06 RX ORDER — CYANOCOBALAMIN 1000 UG/ML
1000 INJECTION, SOLUTION INTRAMUSCULAR; SUBCUTANEOUS
OUTPATIENT
Start: 2024-09-09

## 2024-09-09 ENCOUNTER — INFUSION (OUTPATIENT)
Dept: INFUSION THERAPY | Facility: HOSPITAL | Age: 84
End: 2024-09-09
Attending: INTERNAL MEDICINE
Payer: MEDICARE

## 2024-09-09 VITALS
DIASTOLIC BLOOD PRESSURE: 64 MMHG | OXYGEN SATURATION: 100 % | WEIGHT: 118.81 LBS | BODY MASS INDEX: 21.05 KG/M2 | SYSTOLIC BLOOD PRESSURE: 133 MMHG | RESPIRATION RATE: 18 BRPM | HEART RATE: 75 BPM | HEIGHT: 63 IN | TEMPERATURE: 97 F

## 2024-09-09 DIAGNOSIS — C34.31 PRIMARY MALIGNANT NEOPLASM OF BRONCHUS OF RIGHT LOWER LOBE: Primary | ICD-10-CM

## 2024-09-09 PROCEDURE — 96409 CHEMO IV PUSH SNGL DRUG: CPT

## 2024-09-09 PROCEDURE — 96372 THER/PROPH/DIAG INJ SC/IM: CPT | Mod: 59

## 2024-09-09 PROCEDURE — 25000003 PHARM REV CODE 250: Performed by: INTERNAL MEDICINE

## 2024-09-09 PROCEDURE — 63600175 PHARM REV CODE 636 W HCPCS: Mod: JZ,JG | Performed by: INTERNAL MEDICINE

## 2024-09-09 PROCEDURE — 96367 TX/PROPH/DG ADDL SEQ IV INF: CPT

## 2024-09-09 RX ORDER — CYANOCOBALAMIN 1000 UG/ML
1000 INJECTION, SOLUTION INTRAMUSCULAR; SUBCUTANEOUS
Status: COMPLETED | OUTPATIENT
Start: 2024-09-09 | End: 2024-09-09

## 2024-09-09 RX ORDER — SODIUM CHLORIDE 0.9 % (FLUSH) 0.9 %
10 SYRINGE (ML) INJECTION
Status: DISCONTINUED | OUTPATIENT
Start: 2024-09-09 | End: 2024-09-09 | Stop reason: HOSPADM

## 2024-09-09 RX ORDER — HEPARIN 100 UNIT/ML
500 SYRINGE INTRAVENOUS
Status: DISCONTINUED | OUTPATIENT
Start: 2024-09-09 | End: 2024-09-09 | Stop reason: HOSPADM

## 2024-09-09 RX ADMIN — APREPITANT 130 MG: 130 INJECTION, EMULSION INTRAVENOUS at 01:09

## 2024-09-09 RX ADMIN — HEPARIN 500 UNITS: 100 SYRINGE at 02:09

## 2024-09-09 RX ADMIN — PALONOSETRON HYDROCHLORIDE 0.25 MG: 0.25 INJECTION INTRAVENOUS at 01:09

## 2024-09-09 RX ADMIN — CYANOCOBALAMIN 1000 MCG: 1000 INJECTION, SOLUTION INTRAMUSCULAR at 01:09

## 2024-09-09 RX ADMIN — PEMETREXED DISODIUM 575 MG: 500 INJECTION, POWDER, LYOPHILIZED, FOR SOLUTION INTRAVENOUS at 01:09

## 2024-09-09 NOTE — PLAN OF CARE
Problem: Fatigue  Goal: Improved Activity Tolerance  Intervention: Promote Improved Energy  Flowsheets (Taken 9/9/2024 1404)  Fatigue Management: fatigue-related activity identified  Activity Management: Ambulated -L4

## 2024-09-10 NOTE — TELEPHONE ENCOUNTER
Care Due:                  Date            Visit Type   Department     Provider  --------------------------------------------------------------------------------                                EP -                              PRIMARY      [facility]  Last Visit: 05-      CARE ([facility])   BENJAMIN Greco                              EP -                              PRIMARY      [facility]  Next Visit: 12-      CARE ([facility])   BENJAMIN Greco                                                            Last  Test          Frequency    Reason                     Performed    Due Date  --------------------------------------------------------------------------------    HBA1C.......  6 months...  metFORMIN................  05- 11-    [employer] Embedded Care Due Messages. Reference number: [id number].   9/25/2023 5:50:30 AM CDT   electronic

## 2024-09-11 ENCOUNTER — OFFICE VISIT (OUTPATIENT)
Facility: CLINIC | Age: 84
End: 2024-09-11
Payer: MEDICARE

## 2024-09-11 ENCOUNTER — LAB VISIT (OUTPATIENT)
Dept: LAB | Facility: HOSPITAL | Age: 84
End: 2024-09-11
Attending: NURSE PRACTITIONER
Payer: MEDICARE

## 2024-09-11 VITALS
BODY MASS INDEX: 21.43 KG/M2 | RESPIRATION RATE: 18 BRPM | HEART RATE: 75 BPM | TEMPERATURE: 98 F | SYSTOLIC BLOOD PRESSURE: 167 MMHG | DIASTOLIC BLOOD PRESSURE: 73 MMHG | WEIGHT: 121 LBS

## 2024-09-11 DIAGNOSIS — T45.1X5A PERIPHERAL NEUROPATHY DUE TO CHEMOTHERAPY: ICD-10-CM

## 2024-09-11 DIAGNOSIS — G62.0 PERIPHERAL NEUROPATHY DUE TO CHEMOTHERAPY: ICD-10-CM

## 2024-09-11 DIAGNOSIS — D64.9 ANEMIA, UNSPECIFIED TYPE: Primary | ICD-10-CM

## 2024-09-11 DIAGNOSIS — D63.0 ANEMIA IN NEOPLASTIC DISEASE: ICD-10-CM

## 2024-09-11 DIAGNOSIS — E43 UNSPECIFIED SEVERE PROTEIN-CALORIE MALNUTRITION: ICD-10-CM

## 2024-09-11 DIAGNOSIS — C34.31 MALIGNANT NEOPLASM OF LOWER LOBE OF RIGHT LUNG: ICD-10-CM

## 2024-09-11 LAB
ALBUMIN SERPL BCP-MCNC: 3.2 G/DL (ref 3.5–5.2)
ALP SERPL-CCNC: 58 U/L (ref 55–135)
ALT SERPL W/O P-5'-P-CCNC: 10 U/L (ref 10–44)
ANION GAP SERPL CALC-SCNC: 10 MMOL/L (ref 8–16)
AST SERPL-CCNC: 16 U/L (ref 10–40)
BASOPHILS # BLD AUTO: 0.06 K/UL (ref 0–0.2)
BASOPHILS NFR BLD: 0.6 % (ref 0–1.9)
BILIRUB SERPL-MCNC: 0.3 MG/DL (ref 0.1–1)
BUN SERPL-MCNC: 30 MG/DL (ref 8–23)
CALCIUM SERPL-MCNC: 8.6 MG/DL (ref 8.7–10.5)
CHLORIDE SERPL-SCNC: 104 MMOL/L (ref 95–110)
CO2 SERPL-SCNC: 28 MMOL/L (ref 23–29)
CREAT SERPL-MCNC: 1.2 MG/DL (ref 0.5–1.4)
DIFFERENTIAL METHOD BLD: ABNORMAL
EOSINOPHIL # BLD AUTO: 0.1 K/UL (ref 0–0.5)
EOSINOPHIL NFR BLD: 0.6 % (ref 0–8)
ERYTHROCYTE [DISTWIDTH] IN BLOOD BY AUTOMATED COUNT: 20.4 % (ref 11.5–14.5)
EST. GFR  (NO RACE VARIABLE): 44.9 ML/MIN/1.73 M^2
FERRITIN SERPL-MCNC: 390.4 NG/ML (ref 20–300)
FOLATE SERPL-MCNC: >22.3 NG/ML (ref 4–24)
GLUCOSE SERPL-MCNC: 101 MG/DL (ref 70–110)
HCT VFR BLD AUTO: 28.5 % (ref 37–48.5)
HGB BLD-MCNC: 8.4 G/DL (ref 12–16)
IMM GRANULOCYTES # BLD AUTO: 0.06 K/UL (ref 0–0.04)
IMM GRANULOCYTES NFR BLD AUTO: 0.6 % (ref 0–0.5)
LYMPHOCYTES # BLD AUTO: 0.8 K/UL (ref 1–4.8)
LYMPHOCYTES NFR BLD: 7.6 % (ref 18–48)
MAGNESIUM SERPL-MCNC: 2 MG/DL (ref 1.6–2.6)
MCH RBC QN AUTO: 29.9 PG (ref 27–31)
MCHC RBC AUTO-ENTMCNC: 29.5 G/DL (ref 32–36)
MCV RBC AUTO: 101 FL (ref 82–98)
MONOCYTES # BLD AUTO: 0.8 K/UL (ref 0.3–1)
MONOCYTES NFR BLD: 7.3 % (ref 4–15)
NEUTROPHILS # BLD AUTO: 8.6 K/UL (ref 1.8–7.7)
NEUTROPHILS NFR BLD: 83.3 % (ref 38–73)
NRBC BLD-RTO: 0 /100 WBC
PLATELET # BLD AUTO: 328 K/UL (ref 150–450)
PMV BLD AUTO: 8.5 FL (ref 9.2–12.9)
POTASSIUM SERPL-SCNC: 4.4 MMOL/L (ref 3.5–5.1)
PROT SERPL-MCNC: 6.6 G/DL (ref 6–8.4)
RBC # BLD AUTO: 2.81 M/UL (ref 4–5.4)
SODIUM SERPL-SCNC: 142 MMOL/L (ref 136–145)
VIT B12 SERPL-MCNC: >1500 PG/ML (ref 210–950)
WBC # BLD AUTO: 10.35 K/UL (ref 3.9–12.7)

## 2024-09-11 PROCEDURE — 82728 ASSAY OF FERRITIN: CPT | Performed by: NURSE PRACTITIONER

## 2024-09-11 PROCEDURE — 82607 VITAMIN B-12: CPT | Performed by: NURSE PRACTITIONER

## 2024-09-11 PROCEDURE — 1159F MED LIST DOCD IN RCRD: CPT | Mod: HCNC,CPTII,S$GLB, | Performed by: NURSE PRACTITIONER

## 2024-09-11 PROCEDURE — 3077F SYST BP >= 140 MM HG: CPT | Mod: HCNC,CPTII,S$GLB, | Performed by: NURSE PRACTITIONER

## 2024-09-11 PROCEDURE — 82746 ASSAY OF FOLIC ACID SERUM: CPT | Performed by: NURSE PRACTITIONER

## 2024-09-11 PROCEDURE — 99999 PR PBB SHADOW E&M-EST. PATIENT-LVL III: CPT | Mod: PBBFAC,HCNC,, | Performed by: NURSE PRACTITIONER

## 2024-09-11 PROCEDURE — 1160F RVW MEDS BY RX/DR IN RCRD: CPT | Mod: HCNC,CPTII,S$GLB, | Performed by: NURSE PRACTITIONER

## 2024-09-11 PROCEDURE — 36415 COLL VENOUS BLD VENIPUNCTURE: CPT | Performed by: NURSE PRACTITIONER

## 2024-09-11 PROCEDURE — 1101F PT FALLS ASSESS-DOCD LE1/YR: CPT | Mod: HCNC,CPTII,S$GLB, | Performed by: NURSE PRACTITIONER

## 2024-09-11 PROCEDURE — 1157F ADVNC CARE PLAN IN RCRD: CPT | Mod: HCNC,CPTII,S$GLB, | Performed by: NURSE PRACTITIONER

## 2024-09-11 PROCEDURE — 3288F FALL RISK ASSESSMENT DOCD: CPT | Mod: HCNC,CPTII,S$GLB, | Performed by: NURSE PRACTITIONER

## 2024-09-11 PROCEDURE — 99215 OFFICE O/P EST HI 40 MIN: CPT | Mod: HCNC,S$GLB,, | Performed by: NURSE PRACTITIONER

## 2024-09-11 PROCEDURE — 3078F DIAST BP <80 MM HG: CPT | Mod: HCNC,CPTII,S$GLB, | Performed by: NURSE PRACTITIONER

## 2024-09-11 PROCEDURE — 80053 COMPREHEN METABOLIC PANEL: CPT | Performed by: NURSE PRACTITIONER

## 2024-09-11 PROCEDURE — 83735 ASSAY OF MAGNESIUM: CPT | Performed by: NURSE PRACTITIONER

## 2024-09-11 PROCEDURE — 1126F AMNT PAIN NOTED NONE PRSNT: CPT | Mod: HCNC,CPTII,S$GLB, | Performed by: NURSE PRACTITIONER

## 2024-09-11 PROCEDURE — G2211 COMPLEX E/M VISIT ADD ON: HCPCS | Mod: HCNC,S$GLB,, | Performed by: NURSE PRACTITIONER

## 2024-09-11 PROCEDURE — 85025 COMPLETE CBC W/AUTO DIFF WBC: CPT | Performed by: NURSE PRACTITIONER

## 2024-09-11 NOTE — PROGRESS NOTES
PROGRESS NOTE    Subjective:       Patient ID: Alexa Otero is a 84 y.o. female.    9/21/2020  Bilateral oophorectomy  Right endometrioid Carcinoma  [id number]  S/p Carbo/Taxol x 6    8/15/2023 PET:  IMPRESSION:  1. Multifocal right lower lobe and right pleural FDG hypermetabolism as described, with associated right lower lobe consolidation. These are nonspecific and could be of infectious or inflammatory etiology in the clinical setting of chronic pneumonia, and or metastatic disease. Correlation with previous bronchoscopy results is needed.  2. Multiple new non hypermetabolic pulmonary nodules in both lungs, which are generally below size resolution for PET, but suspicious for pulmonary metastases.  3. No additional findings of FDG avid metastatic disease.    10/31/2023 RLL Lobectomy:  LUNG SYNOPTIC REPORT   PROCEDURE:     Lobectomy.   SPECIMEN LATERALITY:    Right   TUMOR SITE:     Lower lobe.   TUMOR SIZE:     Cannot be determined, 4.5 cm area of cavitation but    tumor appears to involve most if not all of the resected lobe.   TUMOR FOCALITY:    Single tumor.   HISTOLOGIC TYPE:    Invasive mucinous adenocarcinoma with pneumonic    pattern.   VISCEROPLEURAL INVASION:   Present   LYMPHOVASCULAR INVASION:   Not identified.   SPREAD OF TUMOR THROUGH AIR SPACES:  Present   DIRECT INVASION OF ADJACENT STRUCTURES: No adjacent structures present.   MARGINS:     All margins are uninvolved by tumor, margins examined:         Bronchial, distance of invasion of tumor from closest         margin: cannot be determined.   TREATMENT EFFECT:    No known presurgical therapy.   ADDITIONAL PATHOLOGIC FINDINGS:  Organized pulmonary emboli, pulmonary    infarct.   REGIONAL LYMPH NODES:    NUMBER OF LYMPH NODES INVOLVED:  Zero, number of lymph nodes examined:     1, yue         structures examined: 10 (hilar)   PATHOLOGIC STAGE CLASSIFICATION    OF PRIMARY TUMOR:    pT3     REGIONAL LYMPH NODES:   pN0     Comment: The sections show invasive mucinous adenocarcinoma with    findings that suggest so-called pneumonic pattern. there is diffuse    involvement with tumor present in all the histologic sections, often    with a lepidic pattern suggesting spread through the air spaces. The    reported CT findings of a consolidated appearance correlates with the    histologic findings and the reported clinical presentation of a    year-long cough productive of purulent mucus. Mucus is also a common    presentation of this somewhat uncommon pulmonary malignancy. These    tumors are two complete stages pT3 when there appears to be involvement    of the entire lobe; this appears to be appropriate in this case. There    are also organizing/organized pulmonary emboli within the vasculature,    possibly indicating hypercoagulability of malignancy as can be seen    especially with mucinous tumors. The tissue sampled in block 2F is    likely an infarct related to this. The history of endometrioid    adenocarcinoma is noted, but the current tumor represents a primary    lung malignancy and is unrelated to the prior cancer.     10/13/2023-Echo:  Normal systolic function with EF 55-60%  Grade I diastolic dysfunction    12/10/2023-CT CAP:  Progression of the alveolar consolidation in the right mid and lower lung with moderate size right pleural effusion. There is progression of the airspace disease within the right upper lobe with patchy groundglass opacity left upper lobe and left lung base. Findings are compatible with multifocal.     12/15/2023-Bronchoscopy:  LUNG, RIGHT MIDDLE LOBE, BIOPSY:   - ATYPICAL ADENOMATOUS HYPERPLASIA.   - IN A BACKGROUND OF REACTIVE FIBROSIS AND FIBRIN.     Comment:   Given the patient's history, this lesion is concerning for well    differentiated lepidic type adenocarcinoma but is quantitatively    insufficient (approximately 1 mm) for a definite diagnosis.     Multiple  additional leveled sections were examined.     Carbo/Pemetrexed x 4  1/25/2024-4/11/2024    Maintenance Pemetrexed:  Cycle 6: 7/8/2024  Cycle 7: 8/19/2024  Cycle 8: 9/9/2024  Cycle 9: 9/30/2024- Due    4/23/2024-PET:  1. Interval improvement in hypermetabolic right lung airspace opacities, presumably reflecting infectious or inflammatory etiology.  2. No convincing evidence of recurrent neoplasm or metastatic disease.  3. Interval increased size of right pleural effusion.  4. Additional stable observations as described.      Chief Complaint:  Stage IV Lung cancer follow up    History of Present Illness:   Alexa Otero is a 84 y.o. female who presents for follow up of lung cancer.      Patient is doing ok today.  Remains on Alimta maintenance.  Feels like she tolerates this ok.  She does have some fatigue for a few days after each treatment. Denies nay nausea, CP.    Continue on home portable oxygen.    Pet scheduled for 10/1/2024.     Family and Social history reviewed and is unchanged from 12/1/2023       Current Outpatient Medications:     aspirin (ECOTRIN) 81 MG EC tablet, Take 81 mg by mouth once daily., Disp: , Rfl:     benazepriL (LOTENSIN) 40 MG tablet, Take 0.5 tablets (20 mg total) by mouth once daily., Disp: 45 tablet, Rfl: 3    clopidogreL (PLAVIX) 75 mg tablet, Take 1 tablet (75 mg total) by mouth once daily., Disp: 90 tablet, Rfl: 2    folic acid (FOLVITE) 1 MG tablet, Take 1 tablet (1 mg total) by mouth once daily., Disp: 100 tablet, Rfl: 3    gabapentin (NEURONTIN) 100 MG capsule, Take 1 capsule (100 mg total) by mouth 2 (two) times daily., Disp: 180 capsule, Rfl: 3    HYDROcodone-acetaminophen (NORCO) 5-325 mg per tablet, Take 1 tablet by mouth every 6 (six) hours as needed for Pain. (Patient not taking: Reported on 7/11/2024), Disp: , Rfl:     LORazepam (ATIVAN) 1 MG tablet, Take 1 tablet (1 mg total) by mouth every 6 (six) hours as needed for Anxiety (insomnia)., Disp: 30 tablet, Rfl: 2     magnesium oxide (MAG-OX) 400 mg (241.3 mg magnesium) tablet, Take 1 tablet (400 mg total) by mouth once daily. Patient requested refill, Disp: 90 tablet, Rfl: 3    melatonin 5 mg Chew, Take 1 tablet by mouth nightly as needed (insomnia)., Disp: , Rfl:     metoprolol succinate (TOPROL-XL) 25 MG 24 hr tablet, Take 1 tablet (25 mg total) by mouth once daily., Disp: 90 tablet, Rfl: 3    ondansetron (ZOFRAN) 8 MG tablet, Take 1 tablet (8 mg total) by mouth every 8 (eight) hours as needed., Disp: 30 tablet, Rfl: 5    potassium chloride (K-TAB) 20 mEq, Take 1 tablet by mouth once daily., Disp: , Rfl:     potassium chloride SA (K-DUR,KLOR-CON) 20 MEQ tablet, Take 1 tablet daily, Disp: 90 tablet, Rfl: 1    promethazine (PHENERGAN) 25 MG tablet, Take 1 tablet (25 mg total) by mouth every 4 (four) hours as needed for Nausea., Disp: 30 tablet, Rfl: 5    VIT A/VIT C/VIT E/ZINC/COPPER (ICAPS AREDS ORAL), Take 1 capsule by mouth 2 (two) times a day. , Disp: , Rfl:     Current Facility-Administered Medications:     diphenhydrAMINE injection 25 mg, 25 mg, Intravenous, Once, Patito Gibson, NP-C        Objective:       Physical Examination:     BP (!) 167/73   Pulse 75   Temp 98 °F (36.7 °C)   Resp 18   Wt 54.9 kg (121 lb)   BMI 21.43 kg/m²     Physical Exam  Constitutional:       Appearance: Normal appearance.   HENT:      Head: Normocephalic and atraumatic.      Right Ear: External ear normal.      Left Ear: External ear normal.      Nose: Nose normal.      Mouth/Throat:      Mouth: Mucous membranes are moist.   Eyes:      General: No scleral icterus.     Conjunctiva/sclera: Conjunctivae normal.   Cardiovascular:      Rate and Rhythm: Normal rate.   Pulmonary:      Effort: Pulmonary effort is normal.      Comments: Portable oxygen via NC 2 L  Abdominal:      General: Abdomen is flat.   Neurological:      General: No focal deficit present.      Mental Status: She is alert and oriented to person, place, and time.   Psychiatric:          Mood and Affect: Mood normal.         Behavior: Behavior normal.         Thought Content: Thought content normal.         Judgment: Judgment normal.         Labs:   Recent Results (from the past 336 hour(s))   CBC W/ AUTO DIFFERENTIAL    Collection Time: 09/11/24 10:42 AM   Result Value Ref Range    WBC 10.35 3.90 - 12.70 K/uL    Hemoglobin 8.4 (L) 12.0 - 16.0 g/dL    Hematocrit 28.5 (L) 37.0 - 48.5 %    Platelets 328 150 - 450 K/uL   CBC W/ AUTO DIFFERENTIAL    Collection Time: 09/04/24 11:18 AM   Result Value Ref Range    WBC 9.29 3.90 - 12.70 K/uL    Hemoglobin 8.4 (L) 12.0 - 16.0 g/dL    Hematocrit 28.3 (L) 37.0 - 48.5 %    Platelets 316 150 - 450 K/uL     CMP  Sodium   Date Value Ref Range Status   09/11/2024 142 136 - 145 mmol/L Final     Potassium   Date Value Ref Range Status   09/11/2024 4.4 3.5 - 5.1 mmol/L Final     Chloride   Date Value Ref Range Status   09/11/2024 104 95 - 110 mmol/L Final     CO2   Date Value Ref Range Status   09/11/2024 28 23 - 29 mmol/L Final     Glucose   Date Value Ref Range Status   09/11/2024 101 70 - 110 mg/dL Final     BUN   Date Value Ref Range Status   09/11/2024 30 (H) 8 - 23 mg/dL Final     Creatinine   Date Value Ref Range Status   09/11/2024 1.2 0.5 - 1.4 mg/dL Final     Calcium   Date Value Ref Range Status   09/11/2024 8.6 (L) 8.7 - 10.5 mg/dL Final     Total Protein   Date Value Ref Range Status   09/11/2024 6.6 6.0 - 8.4 g/dL Final     Albumin   Date Value Ref Range Status   09/11/2024 3.2 (L) 3.5 - 5.2 g/dL Final     Total Bilirubin   Date Value Ref Range Status   09/11/2024 0.3 0.1 - 1.0 mg/dL Final     Comment:     For infants and newborns, interpretation of results should be based  on gestational age, weight and in agreement with clinical  observations.    Premature Infant recommended reference ranges:  Up to 24 hours.............<8.0 mg/dL  Up to 48 hours............<12.0 mg/dL  3-5 days..................<15.0 mg/dL  6-29 days.................<15.0  mg/dL       Alkaline Phosphatase   Date Value Ref Range Status   09/11/2024 58 55 - 135 U/L Final     AST   Date Value Ref Range Status   09/11/2024 16 10 - 40 U/L Final     ALT   Date Value Ref Range Status   09/11/2024 10 10 - 44 U/L Final     Anion Gap   Date Value Ref Range Status   09/11/2024 10 8 - 16 mmol/L Final     eGFR if    Date Value Ref Range Status   04/28/2022 40.6 (A) >60 mL/min/1.73 m^2 Final     eGFR if non    Date Value Ref Range Status   04/28/2022 35.3 (A) >60 mL/min/1.73 m^2 Final     Comment:     Calculation used to obtain the estimated glomerular filtration  rate (eGFR) is the CKD-EPI equation.              Assessment/Plan:     Problem List Items Addressed This Visit          Neuro    Peripheral neuropathy due to chemotherapy     Other Visit Diagnoses       Anemia, unspecified type    -  Primary    Relevant Orders    Vitamin B12    FOLATE    FERRITIN    Unspecified severe protein-calorie malnutrition        Relevant Orders    Vitamin B12    FOLATE    FERRITIN          Lung cancer- Continue with maint Alimta and PET as scheduled 10/1  Anemia- B12, folate and iron studies  3. Fatigue- Encouraged activity      Discussion:     Follow up in about 3 weeks (around 10/2/2024) for with Dr. Cassidy and 6 weeks with me.      Electronically signed by Patito Gibson, MSN, APRN, AGNP-C, OCN           No

## 2024-09-18 ENCOUNTER — LAB VISIT (OUTPATIENT)
Dept: LAB | Facility: HOSPITAL | Age: 84
End: 2024-09-18
Attending: NURSE PRACTITIONER
Payer: MEDICARE

## 2024-09-18 DIAGNOSIS — C34.31 MALIGNANT NEOPLASM OF LOWER LOBE OF RIGHT LUNG: ICD-10-CM

## 2024-09-18 DIAGNOSIS — D64.9 ANEMIA, UNSPECIFIED TYPE: ICD-10-CM

## 2024-09-18 DIAGNOSIS — E43 UNSPECIFIED SEVERE PROTEIN-CALORIE MALNUTRITION: ICD-10-CM

## 2024-09-18 DIAGNOSIS — D63.0 ANEMIA IN NEOPLASTIC DISEASE: ICD-10-CM

## 2024-09-18 LAB
ALBUMIN SERPL BCP-MCNC: 3.1 G/DL (ref 3.5–5.2)
ALP SERPL-CCNC: 52 U/L (ref 55–135)
ALT SERPL W/O P-5'-P-CCNC: 9 U/L (ref 10–44)
ANION GAP SERPL CALC-SCNC: 11 MMOL/L (ref 8–16)
AST SERPL-CCNC: 11 U/L (ref 10–40)
BASOPHILS # BLD AUTO: 0.02 K/UL (ref 0–0.2)
BASOPHILS NFR BLD: 0.3 % (ref 0–1.9)
BILIRUB SERPL-MCNC: 0.5 MG/DL (ref 0.1–1)
BUN SERPL-MCNC: 28 MG/DL (ref 8–23)
CALCIUM SERPL-MCNC: 8.6 MG/DL (ref 8.7–10.5)
CHLORIDE SERPL-SCNC: 103 MMOL/L (ref 95–110)
CO2 SERPL-SCNC: 26 MMOL/L (ref 23–29)
CREAT SERPL-MCNC: 1.3 MG/DL (ref 0.5–1.4)
DIFFERENTIAL METHOD BLD: ABNORMAL
EOSINOPHIL # BLD AUTO: 0 K/UL (ref 0–0.5)
EOSINOPHIL NFR BLD: 0.3 % (ref 0–8)
ERYTHROCYTE [DISTWIDTH] IN BLOOD BY AUTOMATED COUNT: 19.1 % (ref 11.5–14.5)
EST. GFR  (NO RACE VARIABLE): 40.8 ML/MIN/1.73 M^2
FERRITIN SERPL-MCNC: 1451.3 NG/ML (ref 20–300)
FOLATE SERPL-MCNC: >22.3 NG/ML (ref 4–24)
GLUCOSE SERPL-MCNC: 189 MG/DL (ref 70–110)
HCT VFR BLD AUTO: 28.1 % (ref 37–48.5)
HGB BLD-MCNC: 8.3 G/DL (ref 12–16)
IMM GRANULOCYTES # BLD AUTO: 0.03 K/UL (ref 0–0.04)
IMM GRANULOCYTES NFR BLD AUTO: 0.5 % (ref 0–0.5)
LYMPHOCYTES # BLD AUTO: 1.1 K/UL (ref 1–4.8)
LYMPHOCYTES NFR BLD: 18.6 % (ref 18–48)
MAGNESIUM SERPL-MCNC: 1.9 MG/DL (ref 1.6–2.6)
MCH RBC QN AUTO: 30.2 PG (ref 27–31)
MCHC RBC AUTO-ENTMCNC: 29.5 G/DL (ref 32–36)
MCV RBC AUTO: 102 FL (ref 82–98)
MONOCYTES # BLD AUTO: 1.3 K/UL (ref 0.3–1)
MONOCYTES NFR BLD: 22.9 % (ref 4–15)
NEUTROPHILS # BLD AUTO: 3.4 K/UL (ref 1.8–7.7)
NEUTROPHILS NFR BLD: 57.4 % (ref 38–73)
NRBC BLD-RTO: 0 /100 WBC
PLATELET # BLD AUTO: 188 K/UL (ref 150–450)
PMV BLD AUTO: 9.3 FL (ref 9.2–12.9)
POTASSIUM SERPL-SCNC: 4.3 MMOL/L (ref 3.5–5.1)
PROT SERPL-MCNC: 6.8 G/DL (ref 6–8.4)
RBC # BLD AUTO: 2.75 M/UL (ref 4–5.4)
SODIUM SERPL-SCNC: 140 MMOL/L (ref 136–145)
VIT B12 SERPL-MCNC: >1500 PG/ML (ref 210–950)
WBC # BLD AUTO: 5.85 K/UL (ref 3.9–12.7)

## 2024-09-18 PROCEDURE — 82607 VITAMIN B-12: CPT | Performed by: NURSE PRACTITIONER

## 2024-09-18 PROCEDURE — 82728 ASSAY OF FERRITIN: CPT | Performed by: NURSE PRACTITIONER

## 2024-09-18 PROCEDURE — 36415 COLL VENOUS BLD VENIPUNCTURE: CPT | Performed by: NURSE PRACTITIONER

## 2024-09-18 PROCEDURE — 85025 COMPLETE CBC W/AUTO DIFF WBC: CPT | Performed by: NURSE PRACTITIONER

## 2024-09-18 PROCEDURE — 82746 ASSAY OF FOLIC ACID SERUM: CPT | Performed by: NURSE PRACTITIONER

## 2024-09-18 PROCEDURE — 80053 COMPREHEN METABOLIC PANEL: CPT | Performed by: NURSE PRACTITIONER

## 2024-09-18 PROCEDURE — 83735 ASSAY OF MAGNESIUM: CPT | Performed by: NURSE PRACTITIONER

## 2024-09-19 ENCOUNTER — OFFICE VISIT (OUTPATIENT)
Dept: PULMONOLOGY | Facility: CLINIC | Age: 84
End: 2024-09-19
Payer: MEDICARE

## 2024-09-19 VITALS
BODY MASS INDEX: 21.09 KG/M2 | HEART RATE: 103 BPM | OXYGEN SATURATION: 95 % | DIASTOLIC BLOOD PRESSURE: 60 MMHG | WEIGHT: 119 LBS | SYSTOLIC BLOOD PRESSURE: 110 MMHG | HEIGHT: 63 IN

## 2024-09-19 DIAGNOSIS — C34.90 MUCINOUS ADENOCARCINOMA OF LUNG: ICD-10-CM

## 2024-09-19 DIAGNOSIS — I48.0 PAROXYSMAL ATRIAL FIBRILLATION: Primary | ICD-10-CM

## 2024-09-19 DIAGNOSIS — J96.11 CHRONIC RESPIRATORY FAILURE WITH HYPOXIA: ICD-10-CM

## 2024-09-19 DIAGNOSIS — C34.91 BRONCHOGENIC CANCER OF RIGHT LUNG: ICD-10-CM

## 2024-09-19 PROCEDURE — 99999 PR PBB SHADOW E&M-EST. PATIENT-LVL III: CPT | Mod: PBBFAC,HCNC,, | Performed by: INTERNAL MEDICINE

## 2024-09-19 RX ORDER — AMIODARONE HYDROCHLORIDE 200 MG/1
200 TABLET ORAL DAILY
Qty: 60 TABLET | Refills: 11 | Status: SHIPPED | OUTPATIENT
Start: 2024-09-19 | End: 2026-09-09

## 2024-09-19 NOTE — PROGRESS NOTES
SUBJECTIVE:    Patient ID: Alexa Otero is a 84 y.o. female.    Chief Complaint: Follow-up    Follow-up       The patient returns doing well.  She is taking the Alimta.  She has stopped producing mucus.  She still coughs but it is now a dry cough.  She has gained 4 lb.  Her next PET scan is scheduled for October 1st.  Past Medical History:   Diagnosis Date    Arthritis     Cardiac arrest 10/2023    Cataract     Chronic obstructive pulmonary disease, unspecified COPD type 3/20/2024    Colon polyp     Coronary artery disease 3/20/2024    Diabetes mellitus type II 2012    Encounter for blood transfusion     Extra-ovarian endometrioid adenocarcinoma 2020    GERD (gastroesophageal reflux disease)     History of chronic cough     clear productive    Hyperlipidemia     Hypertension     Macular degeneration     Ovarian cancer     PONV (postoperative nausea and vomiting)     Squamous cell carcinoma 1980's    precancer of cervix     Past Surgical History:   Procedure Laterality Date    AUGMENTATION OF BREAST      BRONCHOSCOPY N/A 12/12/2023    Procedure: BRONCHOSCOPY;  Surgeon: Neva Christian MD;  Location: [facility];  Service: ENT;  Laterality: N/A;    BRONCHOSCOPY WITH FLUOROSCOPY Right 05/11/2023    Procedure: BRONCHOSCOPY, WITH FLUOROSCOPY;  Surgeon: Neva Christian MD;  Location: [facility];  Service: Pulmonary;  Laterality: Right;    BRONCHOSCOPY WITH FLUOROSCOPY N/A 12/15/2023    Procedure: BRONCHOSCOPY, WITH FLUOROSCOPY;  Surgeon: Neva Christian MD;  Location: [facility];  Service: Pulmonary;  Laterality: N/A;    CATARACT EXTRACTION BILATERAL W/ ANTERIOR VITRECTOMY Bilateral     CHOLECYSTECTOMY      COLONOSCOPY      COLONOSCOPY N/A 04/20/2023    Procedure: COLONOSCOPY;  Surgeon: Sabino Stephenson MD;  Location: [facility];  Service: Endoscopy;  Laterality: N/A;    CORONARY STENT PLACEMENT  10/2023    x 3    ESOPHAGOGASTRODUODENOSCOPY N/A 06/20/2018    Procedure: EGD (ESOPHAGOGASTRODUODENOSCOPY);  Surgeon:  Sabino Stephenson MD;  Location: [facility] ENDO;  Service: Endoscopy;  Laterality: N/A;    ESOPHAGOGASTRODUODENOSCOPY N/A 03/31/2023    Procedure: EGD (ESOPHAGOGASTRODUODENOSCOPY);  Surgeon: Sabino Stephenson MD;  Location: [facility] ENDO;  Service: Endoscopy;  Laterality: N/A;    ESOPHAGOGASTRODUODENOSCOPY N/A 12/11/2023    Procedure: EGD (ESOPHAGOGASTRODUODENOSCOPY);  Surgeon: Pretty Salgado MD;  Location: [facility];  Service: Endoscopy;  Laterality: N/A;    HYSTERECTOMY  1976    partial    INJECTION OF ANESTHETIC AGENT AROUND MEDIAL BRANCH NERVES INNERVATING LUMBAR FACET JOINT Right 05/10/2023    Procedure: Block-nerve-Lateral-branch-lumbar;  Surgeon: Agustin Robles MD;  Location: [facility];  Service: Pain Management;  Laterality: Right;  L5 and s1,s2 LBB    INJECTION OF ANESTHETIC AGENT AROUND MEDIAL BRANCH NERVES INNERVATING LUMBAR FACET JOINT Right 05/30/2023    Procedure: Block-nerve-medial branch-lumbar;  Surgeon: Agustin Robles MD;  Location: [facility];  Service: Pain Management;  Laterality: Right;  L5, s1 ,s2 LBB #2    INJECTION OF ANESTHETIC AGENT AROUND NERVE Right 10/31/2023    Procedure: INTERCOSTAL NERVE BLOCK;  Surgeon: Washington Shankar MD;  Location: [facility];  Service: Cardiothoracic;  Laterality: Right;    INJECTION, SACROILIAC JOINT Right 01/26/2023    Procedure: INJECTION,SACROILIAC JOINT;  Surgeon: Agustin Robles MD;  Location: [facility] OR;  Service: Pain Management;  Laterality: Right;    INSERTION OF TUNNELED CENTRAL VENOUS CATHETER (CVC) WITH SUBCUTANEOUS PORT Right 10/19/2020    Procedure: INSERTION, PORT-A-CATH;  Surgeon: Misti Mcfarland MD;  Location: [facility] OR;  Service: General;  Laterality: Right;    INTRAMEDULLARY RODDING OF TROCHANTER OF FEMUR Right 11/28/2021    Procedure: INSERTION, INTRAMEDULLARY LUC, FEMUR, TROCHANTER/RIGHT TFN DR FAJARDO NOTIFIED REP;  Surgeon: Kp Fajardo MD;  Location: [facility];  Service: Orthopedics;  Laterality: Right;  SKIP    ROBOT-ASSISTED LAPAROSCOPIC LYMPHADENECTOMY  USING DA NELLA XI N/A 09/21/2020    Procedure: XI ROBOTIC LYMPHADENECTOMY-pelvic and kell-aortic;  Surgeon: Altagracia Ray MD;  Location: [facility] OR;  Service: OB/GYN;  Laterality: N/A;    ROBOT-ASSISTED LAPAROSCOPIC OMENTECTOMY USING DA NELLA XI N/A 09/21/2020    Procedure: XI ROBOTIC OMENTECTOMY;  Surgeon: Altagracia Ray MD;  Location: [facility] OR;  Service: OB/GYN;  Laterality: N/A;    ROBOT-ASSISTED LAPAROSCOPIC SALPINGO-OOPHORECTOMY USING DA NELLA XI Bilateral 09/21/2020    Procedure: XI ROBOTIC SALPINGO-OOPHORECTOMY;  Surgeon: Altagracia Ray MD;  Location: [facility] OR;  Service: OB/GYN;  Laterality: Bilateral;    THORACOSCOPIC BIOPSY OF PLEURA Right 10/31/2023    Procedure: VATS, WITH PLEURA BIOPSY;  Surgeon: Washington Shankar MD;  Location: [facility] OR;  Service: Cardiothoracic;  Laterality: Right;  FROZEN SECTION   **KRISTEN-ASSISDT**    THORACOTOMY Right 10/31/2023    Procedure: THORACOTOMY;  Surgeon: Washington Shankar MD;  Location: [facility] OR;  Service: Cardiothoracic;  Laterality: Right;    UPPER GASTROINTESTINAL ENDOSCOPY       Family History   Problem Relation Name Age of Onset    Cancer Mother  57        lung    Cancer Father  56        oral    Skin cancer Sister      Skin cancer Brother      Melanoma Brother      Skin cancer Brother      Breast cancer Maternal Grandmother      Breast cancer Paternal Grandmother      Colon polyps Daughter      Psoriasis Neg Hx      Lupus Neg Hx      Eczema Neg Hx      Colon cancer Neg Hx      Crohn's disease Neg Hx      Esophageal cancer Neg Hx      Stomach cancer Neg Hx      Ulcerative colitis Neg Hx          Social History:   Marital Status:   Occupation: Data Unavailable  Alcohol History:  reports current alcohol use of about 1.0 standard drink of alcohol per week.  Tobacco History:  reports that she quit smoking about 43 years ago. Her smoking use included cigarettes. She started smoking about 63 years ago. She has a 20 pack-year smoking history. She has never  used smokeless tobacco.  Drug History:  reports no history of drug use.    Review of patient's allergies indicates:  No Known Allergies    Current Outpatient Medications   Medication Sig Dispense Refill    aspirin (ECOTRIN) 81 MG EC tablet Take 81 mg by mouth once daily.      benazepriL (LOTENSIN) 40 MG tablet Take 0.5 tablets (20 mg total) by mouth once daily. 45 tablet 3    clopidogreL (PLAVIX) 75 mg tablet Take 1 tablet (75 mg total) by mouth once daily. 90 tablet 2    folic acid (FOLVITE) 1 MG tablet Take 1 tablet (1 mg total) by mouth once daily. 100 tablet 3    gabapentin (NEURONTIN) 100 MG capsule Take 1 capsule (100 mg total) by mouth 2 (two) times daily. 180 capsule 3    LORazepam (ATIVAN) 1 MG tablet Take 1 tablet (1 mg total) by mouth every 6 (six) hours as needed for Anxiety (insomnia). 30 tablet 2    magnesium oxide (MAG-OX) 400 mg (241.3 mg magnesium) tablet Take 1 tablet (400 mg total) by mouth once daily. Patient requested refill 90 tablet 3    melatonin 5 mg Chew Take 1 tablet by mouth nightly as needed (insomnia).      metoprolol succinate (TOPROL-XL) 25 MG 24 hr tablet Take 1 tablet (25 mg total) by mouth once daily. 90 tablet 3    ondansetron (ZOFRAN) 8 MG tablet Take 1 tablet (8 mg total) by mouth every 8 (eight) hours as needed. 30 tablet 5    potassium chloride (K-TAB) 20 mEq Take 1 tablet by mouth once daily.      potassium chloride SA (K-DUR,KLOR-CON) 20 MEQ tablet Take 1 tablet daily 90 tablet 1    promethazine (PHENERGAN) 25 MG tablet Take 1 tablet (25 mg total) by mouth every 4 (four) hours as needed for Nausea. 30 tablet 5    VIT A/VIT C/VIT E/ZINC/COPPER (ICAPS AREDS ORAL) Take 1 capsule by mouth 2 (two) times a day.       amiodarone (PACERONE) 200 MG Tab Take 1 tablet (200 mg total) by mouth once daily. 60 tablet 11    apixaban (ELIQUIS) 5 mg Tab Take 1 tablet (5 mg total) by mouth 2 (two) times daily. 2 tablets twice a day for a week, then decrease to 1 tablet twice a day 60 tablet 11  "   HYDROcodone-acetaminophen (NORCO) 5-325 mg per tablet Take 1 tablet by mouth every 6 (six) hours as needed for Pain. (Patient not taking: Reported on 7/11/2024)       Current Facility-Administered Medications   Medication Dose Route Frequency Provider Last Rate Last Admin    diphenhydrAMINE injection 25 mg  25 mg Intravenous Once Patito Gibson NP-C           Last PFT: 5/11/23 FEV1 1.72, no obstruction, no restriction, moderate diffusion defect.    Last CT:3/18/23  IMPRESSION:  No CT evidence for pulmonary embolism.   Focal area of airspace opacity/consolidation involving the posterior segment right lower lobe no significantly changed when compared to prior study.   Emphysematous changes.   Prominent right hilar and subcarinal lymph nodes, may be reactive in nature.   Inferior deformity with minimal increase sclerosis at T11 perhaps suggesting old compression deformity.    PET/CT  4/23/24    1. Interval improvement in hypermetabolic right lung airspace opacities, presumably reflecting infectious or inflammatory etiology.  2. No convincing evidence of recurrent neoplasm or metastatic disease.  3. Interval increased size of right pleural effusion.  4. Additional stable observations as described.    Review of Systems  General: Feeling ok  Eyes: Vision is good. She has macular degeneration.  ENT:  No sinusitis or pharyngitis.   Heart:: No chest pain or palpitations.  Lungs: Coughs, but now dry.  She is on her 2 liters of oxygen  GI:  Back to mostly normal bowel movements with bid stool softerner  : No dysuria, hesitancy, or nocturia.  Musculoskeletal: has R hip pain from time to time  Skin: No lesions or rashes.  Neuro: No headaches or neuropathy.  Lymph: No edema or adenopathy.  Psych: No anxiety or depression.  Endo:  she has gained 4lbs    OBJECTIVE:      /60 (BP Location: Right arm, Patient Position: Sitting, BP Method: Small (Manual))   Pulse 103   Ht 5' 3" (1.6 m)   Wt 54 kg (119 lb)   SpO2 95% " Comment: 2LPM PD  BMI 21.08 kg/m²     Physical Exam  GENERAL: Older patient in no distress.  HEENT: Pupils equal and reactive. Extraocular movements intact. Nose intact.      Pharynx moist.  NECK: Supple.   HEART:  Significantly tachycardic rate and rhythm.  Her rate is over 120.  No murmur or gallop auscultated.  LUNGS:  There are decreased breath sounds in the right posterior thorax. Lung excursion symmetrical. No change in fremitus.  ABDOMEN: Bowel sounds present. Non-tender, no masses palpated.  EXTREMITIES: Normal muscle tone and joint movement, no cyanosis or clubbing.   LYMPHATICS: No adenopathy palpated, no edema.  SKIN: Dry, intact, no lesions.   NEURO: Cranial nerves II-XII intact. Motor strength 5/5 bilaterally, upper and lower extremities.  PSYCH: Appropriate affect.      EKG shows AFib with RVR    Assessment:       1. Paroxysmal atrial fibrillation    2. Bronchogenic cancer of right lung    3. Chronic respiratory failure with hypoxia    4. Mucinous adenocarcinoma of lung          The patient is back in atrial fib with RVR.  She had seen Dr. Villanueva electrophysiologist, over the summer.  I will restart her amiodarone and put her on Eliquis.  She is taking Plavix now.  Will continue this as well.  She did not know she was in AFib she is not short of breath or edematous.  Her saturations are good on her 2 L nasal cannula.  Have no idea how long she has been in the AFib as she does not feel it.  Have asked the patient to call her tomorrow for an appointment and will secure chat her as well.        Plan:       Paroxysmal atrial fibrillation  -     apixaban (ELIQUIS) 5 mg Tab; Take 1 tablet (5 mg total) by mouth 2 (two) times daily. 2 tablets twice a day for a week, then decrease to 1 tablet twice a day  Dispense: 60 tablet; Refill: 11  -     amiodarone (PACERONE) 200 MG Tab; Take 1 tablet (200 mg total) by mouth once daily.  Dispense: 60 tablet; Refill: 11    Bronchogenic cancer of right lung    Chronic  respiratory failure with hypoxia    Mucinous adenocarcinoma of lung            Start Eliquis 10mg twice a day, then 5mg bid after a week  Amiodarone 200mg twice a day  Call Dr. Villanueva   Secure chat Dr. Villanueva  Follow up in about 3 months (around 12/19/2024).

## 2024-09-25 ENCOUNTER — TELEPHONE (OUTPATIENT)
Facility: CLINIC | Age: 84
End: 2024-09-25
Payer: MEDICARE

## 2024-09-25 ENCOUNTER — LAB VISIT (OUTPATIENT)
Dept: LAB | Facility: HOSPITAL | Age: 84
End: 2024-09-25
Attending: NURSE PRACTITIONER
Payer: MEDICARE

## 2024-09-25 DIAGNOSIS — C34.31 MALIGNANT NEOPLASM OF LOWER LOBE OF RIGHT LUNG: ICD-10-CM

## 2024-09-25 DIAGNOSIS — D64.9 ANEMIA, UNSPECIFIED TYPE: Primary | ICD-10-CM

## 2024-09-25 DIAGNOSIS — D63.0 ANEMIA IN NEOPLASTIC DISEASE: ICD-10-CM

## 2024-09-25 DIAGNOSIS — T45.1X5A ANEMIA ASSOCIATED WITH CHEMOTHERAPY: ICD-10-CM

## 2024-09-25 DIAGNOSIS — D63.0 ANEMIA IN NEOPLASTIC DISEASE: Primary | ICD-10-CM

## 2024-09-25 DIAGNOSIS — N18.9 CHRONIC KIDNEY DISEASE, UNSPECIFIED CKD STAGE: ICD-10-CM

## 2024-09-25 DIAGNOSIS — D64.81 ANEMIA ASSOCIATED WITH CHEMOTHERAPY: ICD-10-CM

## 2024-09-25 LAB
ALBUMIN SERPL BCP-MCNC: 3.2 G/DL (ref 3.5–5.2)
ALP SERPL-CCNC: 53 U/L (ref 55–135)
ALT SERPL W/O P-5'-P-CCNC: 11 U/L (ref 10–44)
ANION GAP SERPL CALC-SCNC: 11 MMOL/L (ref 8–16)
AST SERPL-CCNC: 14 U/L (ref 10–40)
BASOPHILS # BLD AUTO: 0.01 K/UL (ref 0–0.2)
BASOPHILS NFR BLD: 0.1 % (ref 0–1.9)
BILIRUB SERPL-MCNC: 0.3 MG/DL (ref 0.1–1)
BUN SERPL-MCNC: 23 MG/DL (ref 8–23)
CALCIUM SERPL-MCNC: 8.6 MG/DL (ref 8.7–10.5)
CHLORIDE SERPL-SCNC: 105 MMOL/L (ref 95–110)
CO2 SERPL-SCNC: 24 MMOL/L (ref 23–29)
CREAT SERPL-MCNC: 1.3 MG/DL (ref 0.5–1.4)
DIFFERENTIAL METHOD BLD: ABNORMAL
EOSINOPHIL # BLD AUTO: 0 K/UL (ref 0–0.5)
EOSINOPHIL NFR BLD: 0.2 % (ref 0–8)
ERYTHROCYTE [DISTWIDTH] IN BLOOD BY AUTOMATED COUNT: 21.2 % (ref 11.5–14.5)
EST. GFR  (NO RACE VARIABLE): 40.8 ML/MIN/1.73 M^2
GLUCOSE SERPL-MCNC: 151 MG/DL (ref 70–110)
HCT VFR BLD AUTO: 25.6 % (ref 37–48.5)
HGB BLD-MCNC: 7.6 G/DL (ref 12–16)
IMM GRANULOCYTES # BLD AUTO: 0.12 K/UL (ref 0–0.04)
IMM GRANULOCYTES NFR BLD AUTO: 1.1 % (ref 0–0.5)
LYMPHOCYTES # BLD AUTO: 0.8 K/UL (ref 1–4.8)
LYMPHOCYTES NFR BLD: 7 % (ref 18–48)
MAGNESIUM SERPL-MCNC: 2 MG/DL (ref 1.6–2.6)
MCH RBC QN AUTO: 31.3 PG (ref 27–31)
MCHC RBC AUTO-ENTMCNC: 29.7 G/DL (ref 32–36)
MCV RBC AUTO: 105 FL (ref 82–98)
MONOCYTES # BLD AUTO: 1.3 K/UL (ref 0.3–1)
MONOCYTES NFR BLD: 11.5 % (ref 4–15)
NEUTROPHILS # BLD AUTO: 8.9 K/UL (ref 1.8–7.7)
NEUTROPHILS NFR BLD: 80.1 % (ref 38–73)
NRBC BLD-RTO: 0 /100 WBC
PLATELET # BLD AUTO: 329 K/UL (ref 150–450)
PMV BLD AUTO: 8.5 FL (ref 9.2–12.9)
POTASSIUM SERPL-SCNC: 4.7 MMOL/L (ref 3.5–5.1)
PROT SERPL-MCNC: 6.6 G/DL (ref 6–8.4)
RBC # BLD AUTO: 2.43 M/UL (ref 4–5.4)
SODIUM SERPL-SCNC: 140 MMOL/L (ref 136–145)
WBC # BLD AUTO: 11.05 K/UL (ref 3.9–12.7)

## 2024-09-25 PROCEDURE — 85025 COMPLETE CBC W/AUTO DIFF WBC: CPT | Performed by: NURSE PRACTITIONER

## 2024-09-25 PROCEDURE — 83735 ASSAY OF MAGNESIUM: CPT | Performed by: NURSE PRACTITIONER

## 2024-09-25 PROCEDURE — 36415 COLL VENOUS BLD VENIPUNCTURE: CPT | Performed by: NURSE PRACTITIONER

## 2024-09-25 PROCEDURE — 80053 COMPREHEN METABOLIC PANEL: CPT | Performed by: NURSE PRACTITIONER

## 2024-09-25 RX ORDER — HYDROCODONE BITARTRATE AND ACETAMINOPHEN 500; 5 MG/1; MG/1
TABLET ORAL ONCE
Status: CANCELLED | OUTPATIENT
Start: 2024-09-25 | End: 2024-09-25

## 2024-09-25 RX ORDER — DIPHENHYDRAMINE HYDROCHLORIDE 50 MG/ML
25 INJECTION INTRAMUSCULAR; INTRAVENOUS
Status: CANCELLED | OUTPATIENT
Start: 2024-09-25

## 2024-09-25 RX ORDER — ACETAMINOPHEN 325 MG/1
650 TABLET ORAL
Status: CANCELLED | OUTPATIENT
Start: 2024-09-25

## 2024-09-25 NOTE — TELEPHONE ENCOUNTER
Called patient on regard to laboratory work, per Patito Gibson NP patient will need 2 units of RBC, also repeat CMP and Erythropoietin, due to kidney function out of range, a referral to Dr. Mendoza has been place. Patient stated understanding.

## 2024-09-26 ENCOUNTER — LAB VISIT (OUTPATIENT)
Dept: LAB | Facility: HOSPITAL | Age: 84
End: 2024-09-26
Attending: NURSE PRACTITIONER
Payer: MEDICARE

## 2024-09-26 DIAGNOSIS — T45.1X5A ANEMIA ASSOCIATED WITH CHEMOTHERAPY: ICD-10-CM

## 2024-09-26 DIAGNOSIS — D63.0 ANEMIA IN NEOPLASTIC DISEASE: ICD-10-CM

## 2024-09-26 DIAGNOSIS — D64.81 ANEMIA ASSOCIATED WITH CHEMOTHERAPY: ICD-10-CM

## 2024-09-26 LAB
ABO + RH BLD: NORMAL
ALBUMIN SERPL BCP-MCNC: 2.4 G/DL (ref 3.5–5.2)
ALP SERPL-CCNC: 57 U/L (ref 55–135)
ALT SERPL W/O P-5'-P-CCNC: 11 U/L (ref 10–44)
ANION GAP SERPL CALC-SCNC: 12 MMOL/L (ref 8–16)
AST SERPL-CCNC: 17 U/L (ref 10–40)
BILIRUB SERPL-MCNC: 0.3 MG/DL (ref 0.1–1)
BLD GP AB SCN CELLS X3 SERPL QL: NORMAL
BUN SERPL-MCNC: 20 MG/DL (ref 8–23)
CALCIUM SERPL-MCNC: 9.1 MG/DL (ref 8.7–10.5)
CHLORIDE SERPL-SCNC: 105 MMOL/L (ref 95–110)
CO2 SERPL-SCNC: 23 MMOL/L (ref 23–29)
CREAT SERPL-MCNC: 1.2 MG/DL (ref 0.5–1.4)
EST. GFR  (NO RACE VARIABLE): 45 ML/MIN/1.73 M^2
GLUCOSE SERPL-MCNC: 149 MG/DL (ref 70–110)
POTASSIUM SERPL-SCNC: 5 MMOL/L (ref 3.5–5.1)
PROT SERPL-MCNC: 6.6 G/DL (ref 6–8.4)
SODIUM SERPL-SCNC: 140 MMOL/L (ref 136–145)
SPECIMEN OUTDATE: NORMAL

## 2024-09-26 PROCEDURE — 82668 ASSAY OF ERYTHROPOIETIN: CPT | Performed by: NURSE PRACTITIONER

## 2024-09-26 PROCEDURE — 80053 COMPREHEN METABOLIC PANEL: CPT | Performed by: NURSE PRACTITIONER

## 2024-09-26 PROCEDURE — 86850 RBC ANTIBODY SCREEN: CPT | Performed by: NURSE PRACTITIONER

## 2024-09-26 PROCEDURE — 86900 BLOOD TYPING SEROLOGIC ABO: CPT | Performed by: NURSE PRACTITIONER

## 2024-09-26 RX ORDER — CYANOCOBALAMIN 1000 UG/ML
1000 INJECTION, SOLUTION INTRAMUSCULAR; SUBCUTANEOUS
OUTPATIENT
Start: 2024-09-30

## 2024-09-26 RX ORDER — HEPARIN 100 UNIT/ML
500 SYRINGE INTRAVENOUS
OUTPATIENT
Start: 2024-09-30

## 2024-09-26 RX ORDER — SODIUM CHLORIDE 0.9 % (FLUSH) 0.9 %
10 SYRINGE (ML) INJECTION
OUTPATIENT
Start: 2024-09-30

## 2024-09-27 ENCOUNTER — INFUSION (OUTPATIENT)
Dept: INFUSION THERAPY | Facility: HOSPITAL | Age: 84
End: 2024-09-27
Attending: NURSE PRACTITIONER
Payer: MEDICARE

## 2024-09-27 VITALS
TEMPERATURE: 99 F | DIASTOLIC BLOOD PRESSURE: 71 MMHG | SYSTOLIC BLOOD PRESSURE: 120 MMHG | WEIGHT: 119 LBS | HEIGHT: 63 IN | BODY MASS INDEX: 21.09 KG/M2 | HEART RATE: 105 BPM | OXYGEN SATURATION: 99 % | RESPIRATION RATE: 18 BRPM

## 2024-09-27 DIAGNOSIS — Z51.11 MAINTENANCE CHEMOTHERAPY: ICD-10-CM

## 2024-09-27 DIAGNOSIS — D63.0 ANEMIA IN NEOPLASTIC DISEASE: ICD-10-CM

## 2024-09-27 DIAGNOSIS — C56.1 CARCINOMA OF RIGHT OVARY: Primary | ICD-10-CM

## 2024-09-27 PROCEDURE — 25000003 PHARM REV CODE 250: Performed by: NURSE PRACTITIONER

## 2024-09-27 PROCEDURE — P9016 RBC LEUKOCYTES REDUCED: HCPCS | Performed by: NURSE PRACTITIONER

## 2024-09-27 PROCEDURE — 63600175 PHARM REV CODE 636 W HCPCS: Performed by: OBSTETRICS & GYNECOLOGY

## 2024-09-27 PROCEDURE — 36430 TRANSFUSION BLD/BLD COMPNT: CPT

## 2024-09-27 RX ORDER — HEPARIN 100 UNIT/ML
500 SYRINGE INTRAVENOUS
Status: COMPLETED | OUTPATIENT
Start: 2024-09-27 | End: 2024-09-27

## 2024-09-27 RX ORDER — SODIUM CHLORIDE 0.9 % (FLUSH) 0.9 %
10 SYRINGE (ML) INJECTION
Status: DISCONTINUED | OUTPATIENT
Start: 2024-09-27 | End: 2024-09-27 | Stop reason: HOSPADM

## 2024-09-27 RX ORDER — ACETAMINOPHEN 325 MG/1
650 TABLET ORAL
Status: COMPLETED | OUTPATIENT
Start: 2024-09-27 | End: 2024-09-27

## 2024-09-27 RX ORDER — HEPARIN 100 UNIT/ML
500 SYRINGE INTRAVENOUS
Start: 2024-09-27

## 2024-09-27 RX ORDER — HYDROCODONE BITARTRATE AND ACETAMINOPHEN 500; 5 MG/1; MG/1
TABLET ORAL ONCE
Status: COMPLETED | OUTPATIENT
Start: 2024-09-27 | End: 2024-09-27

## 2024-09-27 RX ORDER — SODIUM CHLORIDE 0.9 % (FLUSH) 0.9 %
10 SYRINGE (ML) INJECTION
Start: 2024-09-27

## 2024-09-27 RX ORDER — DIPHENHYDRAMINE HYDROCHLORIDE 50 MG/ML
25 INJECTION INTRAMUSCULAR; INTRAVENOUS
Status: ACTIVE | OUTPATIENT
Start: 2024-09-27

## 2024-09-27 RX ADMIN — SODIUM CHLORIDE: 9 INJECTION, SOLUTION INTRAVENOUS at 09:09

## 2024-09-27 RX ADMIN — HEPARIN 500 UNITS: 100 SYRINGE at 11:09

## 2024-09-27 RX ADMIN — ACETAMINOPHEN 650 MG: 325 TABLET ORAL at 09:09

## 2024-09-27 NOTE — PLAN OF CARE
Problem: Fatigue  Goal: Improved Activity Tolerance  Outcome: Progressing     Problem: Adult Inpatient Plan of Care  Goal: Plan of Care Review  Outcome: Progressing  Goal: Patient-Specific Goal (Individualized)  Outcome: Progressing  Goal: Absence of Hospital-Acquired Illness or Injury  Outcome: Progressing  Goal: Optimal Comfort and Wellbeing  Outcome: Progressing  Goal: Readiness for Transition of Care  Outcome: Progressing

## 2024-09-30 ENCOUNTER — INFUSION (OUTPATIENT)
Dept: INFUSION THERAPY | Facility: HOSPITAL | Age: 84
End: 2024-09-30
Attending: INTERNAL MEDICINE
Payer: MEDICARE

## 2024-09-30 VITALS
RESPIRATION RATE: 15 BRPM | BODY MASS INDEX: 21.51 KG/M2 | OXYGEN SATURATION: 99 % | SYSTOLIC BLOOD PRESSURE: 112 MMHG | HEART RATE: 118 BPM | DIASTOLIC BLOOD PRESSURE: 66 MMHG | TEMPERATURE: 98 F | WEIGHT: 121.38 LBS | HEIGHT: 63 IN

## 2024-09-30 DIAGNOSIS — C34.31 PRIMARY MALIGNANT NEOPLASM OF BRONCHUS OF RIGHT LOWER LOBE: Primary | ICD-10-CM

## 2024-09-30 LAB — EPO SERPL-ACNC: 58.4 MIU/ML (ref 2.6–18.5)

## 2024-09-30 PROCEDURE — A4216 STERILE WATER/SALINE, 10 ML: HCPCS | Performed by: INTERNAL MEDICINE

## 2024-09-30 PROCEDURE — 96375 TX/PRO/DX INJ NEW DRUG ADDON: CPT

## 2024-09-30 PROCEDURE — 96409 CHEMO IV PUSH SNGL DRUG: CPT

## 2024-09-30 PROCEDURE — 96367 TX/PROPH/DG ADDL SEQ IV INF: CPT

## 2024-09-30 PROCEDURE — 25000003 PHARM REV CODE 250: Performed by: INTERNAL MEDICINE

## 2024-09-30 PROCEDURE — 63600175 PHARM REV CODE 636 W HCPCS: Performed by: INTERNAL MEDICINE

## 2024-09-30 PROCEDURE — 96372 THER/PROPH/DIAG INJ SC/IM: CPT | Mod: 59

## 2024-09-30 RX ORDER — SODIUM CHLORIDE 0.9 % (FLUSH) 0.9 %
10 SYRINGE (ML) INJECTION
Status: DISCONTINUED | OUTPATIENT
Start: 2024-09-30 | End: 2024-09-30 | Stop reason: HOSPADM

## 2024-09-30 RX ORDER — HEPARIN 100 UNIT/ML
500 SYRINGE INTRAVENOUS
Status: DISCONTINUED | OUTPATIENT
Start: 2024-09-30 | End: 2024-09-30 | Stop reason: HOSPADM

## 2024-09-30 RX ORDER — CYANOCOBALAMIN 1000 UG/ML
1000 INJECTION, SOLUTION INTRAMUSCULAR; SUBCUTANEOUS
Status: COMPLETED | OUTPATIENT
Start: 2024-09-30 | End: 2024-09-30

## 2024-09-30 RX ADMIN — SODIUM CHLORIDE, PRESERVATIVE FREE 10 ML: 5 INJECTION INTRAVENOUS at 02:09

## 2024-09-30 RX ADMIN — PALONOSETRON HYDROCHLORIDE 0.25 MG: 0.25 INJECTION INTRAVENOUS at 02:09

## 2024-09-30 RX ADMIN — PEMETREXED DISODIUM 575 MG: 500 INJECTION, POWDER, LYOPHILIZED, FOR SOLUTION INTRAVENOUS at 02:09

## 2024-09-30 RX ADMIN — CYANOCOBALAMIN 1000 MCG: 1000 INJECTION, SOLUTION INTRAMUSCULAR at 01:09

## 2024-09-30 RX ADMIN — HEPARIN 500 UNITS: 100 SYRINGE at 02:09

## 2024-09-30 RX ADMIN — APREPITANT 130 MG: 130 INJECTION, EMULSION INTRAVENOUS at 01:09

## 2024-09-30 NOTE — NURSING
Educated pt to monitor HR at home and to seek immediate medical attention if needed for increased HR over 130s that sustains due to AFIB. Pt stated understanding.

## 2024-09-30 NOTE — PLAN OF CARE
Problem: Fall Injury Risk  Goal: Absence of Fall and Fall-Related Injury  Outcome: Progressing  Intervention: Identify and Manage Contributors  Flowsheets (Taken 9/30/2024 [id number])  Self-Care Promotion:   independence encouraged   BADL personal objects within reach   adaptive equipment use encouraged  Medication Review/Management: medications reviewed

## 2024-09-30 NOTE — NURSING
Pt currently in a fib -130s sustaining. Pt stated she was put on eliquis and amiodarone at her office visit with Dr. Christian 2 weeks ago. Pt has follow up with MD Ange and cardiologist. Per ZION Gibson ok to treat today.

## 2024-10-01 ENCOUNTER — HOSPITAL ENCOUNTER (OUTPATIENT)
Dept: RADIOLOGY | Facility: HOSPITAL | Age: 84
Discharge: HOME | End: 2024-10-01
Attending: NURSE PRACTITIONER
Payer: MEDICARE

## 2024-10-01 VITALS — WEIGHT: 119 LBS | BODY MASS INDEX: 21.09 KG/M2 | HEIGHT: 63 IN

## 2024-10-01 DIAGNOSIS — C34.31 PRIMARY MALIGNANT NEOPLASM OF BRONCHUS OF RIGHT LOWER LOBE: ICD-10-CM

## 2024-10-01 LAB — POCT GLUCOSE: 158 MG/DL (ref 70–110)

## 2024-10-01 PROCEDURE — 82962 GLUCOSE BLOOD TEST: CPT | Mod: PO

## 2024-10-01 PROCEDURE — 78815 PET IMAGE W/CT SKULL-THIGH: CPT | Mod: 26,PS,, | Performed by: RADIOLOGY

## 2024-10-01 PROCEDURE — A9552 F18 FDG: HCPCS | Mod: PO | Performed by: NURSE PRACTITIONER

## 2024-10-01 PROCEDURE — 78815 PET IMAGE W/CT SKULL-THIGH: CPT | Mod: TC,PO

## 2024-10-01 RX ORDER — FLUDEOXYGLUCOSE F18 500 MCI/ML
12.8 INJECTION INTRAVENOUS
Status: COMPLETED | OUTPATIENT
Start: 2024-10-01 | End: 2024-10-01

## 2024-10-01 RX ADMIN — FLUDEOXYGLUCOSE F-18 12.8 MILLICURIE: 500 INJECTION INTRAVENOUS at 01:10

## 2024-10-02 ENCOUNTER — OFFICE VISIT (OUTPATIENT)
Facility: CLINIC | Age: 84
End: 2024-10-02
Payer: MEDICARE

## 2024-10-02 VITALS
WEIGHT: 124.69 LBS | DIASTOLIC BLOOD PRESSURE: 70 MMHG | TEMPERATURE: 98 F | HEART RATE: 138 BPM | BODY MASS INDEX: 22.09 KG/M2 | SYSTOLIC BLOOD PRESSURE: 130 MMHG | RESPIRATION RATE: 17 BRPM

## 2024-10-02 DIAGNOSIS — C34.31 PRIMARY MALIGNANT NEOPLASM OF BRONCHUS OF RIGHT LOWER LOBE: Primary | ICD-10-CM

## 2024-10-02 DIAGNOSIS — R00.0 TACHYCARDIA: ICD-10-CM

## 2024-10-02 PROCEDURE — 1101F PT FALLS ASSESS-DOCD LE1/YR: CPT | Mod: HCNC,CPTII,S$GLB, | Performed by: INTERNAL MEDICINE

## 2024-10-02 PROCEDURE — 1157F ADVNC CARE PLAN IN RCRD: CPT | Mod: HCNC,CPTII,S$GLB, | Performed by: INTERNAL MEDICINE

## 2024-10-02 PROCEDURE — 1159F MED LIST DOCD IN RCRD: CPT | Mod: HCNC,CPTII,S$GLB, | Performed by: INTERNAL MEDICINE

## 2024-10-02 PROCEDURE — 1126F AMNT PAIN NOTED NONE PRSNT: CPT | Mod: HCNC,CPTII,S$GLB, | Performed by: INTERNAL MEDICINE

## 2024-10-02 PROCEDURE — 99999 PR PBB SHADOW E&M-EST. PATIENT-LVL IV: CPT | Mod: PBBFAC,HCNC,, | Performed by: INTERNAL MEDICINE

## 2024-10-02 PROCEDURE — 99215 OFFICE O/P EST HI 40 MIN: CPT | Mod: HCNC,S$GLB,, | Performed by: INTERNAL MEDICINE

## 2024-10-02 PROCEDURE — 3288F FALL RISK ASSESSMENT DOCD: CPT | Mod: HCNC,CPTII,S$GLB, | Performed by: INTERNAL MEDICINE

## 2024-10-02 PROCEDURE — G2211 COMPLEX E/M VISIT ADD ON: HCPCS | Mod: HCNC,S$GLB,, | Performed by: INTERNAL MEDICINE

## 2024-10-02 PROCEDURE — 3075F SYST BP GE 130 - 139MM HG: CPT | Mod: HCNC,CPTII,S$GLB, | Performed by: INTERNAL MEDICINE

## 2024-10-02 PROCEDURE — 3078F DIAST BP <80 MM HG: CPT | Mod: HCNC,CPTII,S$GLB, | Performed by: INTERNAL MEDICINE

## 2024-10-02 NOTE — ASSESSMENT & PLAN NOTE
Her HR is in the 130s but she is feeling fine with no symptoms.  Will message her cardiology group for recommendations.  Continue close monitoring as well.     Manual recheck done personally was 110.  Dr. Villanueva notified and will reach out to patient.

## 2024-10-02 NOTE — ASSESSMENT & PLAN NOTE
Patient is doing well with this maintenance therapy with apparent good response of the cancer on imaging.  She is having a more difficult time tolerating this so will begin her treatments now at 28 day cycle.  Discussed this today and will continue to watch her very closely.  Continue weekly labs.  Note is made of chemo induced anemia and will give blood transfusions as needed.

## 2024-10-02 NOTE — PROGRESS NOTES
PROGRESS NOTE    Subjective:       Patient ID: Alexa Otero is a 84 y.o. female.    9/21/2020  Bilateral oophorectomy  Right endometrioid Carcinoma  [id number]  S/p Carbo/Taxol x 6    8/15/2023 PET:  IMPRESSION:  1. Multifocal right lower lobe and right pleural FDG hypermetabolism as described, with associated right lower lobe consolidation. These are nonspecific and could be of infectious or inflammatory etiology in the clinical setting of chronic pneumonia, and or metastatic disease. Correlation with previous bronchoscopy results is needed.  2. Multiple new non hypermetabolic pulmonary nodules in both lungs, which are generally below size resolution for PET, but suspicious for pulmonary metastases.  3. No additional findings of FDG avid metastatic disease.    10/31/2023 RLL Lobectomy:  LUNG SYNOPTIC REPORT   PROCEDURE:     Lobectomy.   SPECIMEN LATERALITY:    Right   TUMOR SITE:     Lower lobe.   TUMOR SIZE:     Cannot be determined, 4.5 cm area of cavitation but    tumor appears to involve most if not all of the resected lobe.   TUMOR FOCALITY:    Single tumor.   HISTOLOGIC TYPE:    Invasive mucinous adenocarcinoma with pneumonic    pattern.   VISCEROPLEURAL INVASION:   Present   LYMPHOVASCULAR INVASION:   Not identified.   SPREAD OF TUMOR THROUGH AIR SPACES:  Present   DIRECT INVASION OF ADJACENT STRUCTURES: No adjacent structures present.   MARGINS:     All margins are uninvolved by tumor, margins examined:         Bronchial, distance of invasion of tumor from closest         margin: cannot be determined.   TREATMENT EFFECT:    No known presurgical therapy.   ADDITIONAL PATHOLOGIC FINDINGS:  Organized pulmonary emboli, pulmonary    infarct.   REGIONAL LYMPH NODES:    NUMBER OF LYMPH NODES INVOLVED:  Zero, number of lymph nodes examined:     1, yue         structures examined: 10 (hilar)   PATHOLOGIC STAGE CLASSIFICATION    OF PRIMARY TUMOR:    pT3     REGIONAL LYMPH NODES:   pN0     Comment: The sections show invasive mucinous adenocarcinoma with    findings that suggest so-called pneumonic pattern. there is diffuse    involvement with tumor present in all the histologic sections, often    with a lepidic pattern suggesting spread through the air spaces. The    reported CT findings of a consolidated appearance correlates with the    histologic findings and the reported clinical presentation of a    year-long cough productive of purulent mucus. Mucus is also a common    presentation of this somewhat uncommon pulmonary malignancy. These    tumors are two complete stages pT3 when there appears to be involvement    of the entire lobe; this appears to be appropriate in this case. There    are also organizing/organized pulmonary emboli within the vasculature,    possibly indicating hypercoagulability of malignancy as can be seen    especially with mucinous tumors. The tissue sampled in block 2F is    likely an infarct related to this. The history of endometrioid    adenocarcinoma is noted, but the current tumor represents a primary    lung malignancy and is unrelated to the prior cancer.     10/13/2023-Echo:  Normal systolic function with EF 55-60%  Grade I diastolic dysfunction    12/10/2023-CT CAP:  Progression of the alveolar consolidation in the right mid and lower lung with moderate size right pleural effusion. There is progression of the airspace disease within the right upper lobe with patchy groundglass opacity left upper lobe and left lung base. Findings are compatible with multifocal.     12/15/2023-Bronchoscopy:  LUNG, RIGHT MIDDLE LOBE, BIOPSY:   - ATYPICAL ADENOMATOUS HYPERPLASIA.   - IN A BACKGROUND OF REACTIVE FIBROSIS AND FIBRIN.     Comment:   Given the patient's history, this lesion is concerning for well    differentiated lepidic type adenocarcinoma but is quantitatively    insufficient (approximately 1 mm) for a definite diagnosis.     Multiple  additional leveled sections were examined.     Carbo/Pemetrexed x 4  1/25/2024-4/11/2024    Maintenance Pemetrexed:  Cycle 6: 7/8/2024  Cycle 7: 8/19/2024  Cycle 8: 9/9/2024  Cycle 9: 9/30/2024  Cycle 10: 10/21/2024-due    4/23/2024-PET:  1. Interval improvement in hypermetabolic right lung airspace opacities, presumably reflecting infectious or inflammatory etiology.  2. No convincing evidence of recurrent neoplasm or metastatic disease.  3. Interval increased size of right pleural effusion.  4. Additional stable observations as described.    10/1/2024-PET:  Lung opacities improved  No new disease    9/25/2024  Newly diagnosed with afib      Chief Complaint:  No chief complaint on file.  Stage IV Lung cancer follow up    History of Present Illness:   Alexa Otero is a 84 y.o. female who presents for follow up of lung cancer.      Patient is doing ok today.  Remains on Alimta maintenance.  She is doing ok at this time and has no new complaints.  Last chemo made her very tired.    She did have 2 units of blood on 9/29/2024.     Pet scheduled for 10/1/2024.     Family and Social history reviewed and is unchanged from 12/1/2023             Current Outpatient Medications:     amiodarone (PACERONE) 200 MG Tab, Take 1 tablet (200 mg total) by mouth once daily., Disp: 60 tablet, Rfl: 11    apixaban (ELIQUIS) 5 mg Tab, Take 1 tablet (5 mg total) by mouth 2 (two) times daily. 2 tablets twice a day for a week, then decrease to 1 tablet twice a day, Disp: 60 tablet, Rfl: 11    aspirin (ECOTRIN) 81 MG EC tablet, Take 81 mg by mouth once daily., Disp: , Rfl:     benazepriL (LOTENSIN) 40 MG tablet, Take 0.5 tablets (20 mg total) by mouth once daily., Disp: 45 tablet, Rfl: 3    clopidogreL (PLAVIX) 75 mg tablet, Take 1 tablet (75 mg total) by mouth once daily., Disp: 90 tablet, Rfl: 2    folic acid (FOLVITE) 1 MG tablet, Take 1 tablet (1 mg total) by mouth once daily., Disp: 100 tablet, Rfl: 3    gabapentin (NEURONTIN) 100 MG  capsule, Take 1 capsule (100 mg total) by mouth 2 (two) times daily., Disp: 180 capsule, Rfl: 3    HYDROcodone-acetaminophen (NORCO) 5-325 mg per tablet, Take 1 tablet by mouth every 6 (six) hours as needed for Pain. (Patient not taking: Reported on 7/11/2024), Disp: , Rfl:     LORazepam (ATIVAN) 1 MG tablet, Take 1 tablet (1 mg total) by mouth every 6 (six) hours as needed for Anxiety (insomnia)., Disp: 30 tablet, Rfl: 2    magnesium oxide (MAG-OX) 400 mg (241.3 mg magnesium) tablet, Take 1 tablet (400 mg total) by mouth once daily. Patient requested refill, Disp: 90 tablet, Rfl: 3    melatonin 5 mg Chew, Take 1 tablet by mouth nightly as needed (insomnia)., Disp: , Rfl:     metoprolol succinate (TOPROL-XL) 25 MG 24 hr tablet, Take 1 tablet (25 mg total) by mouth once daily., Disp: 90 tablet, Rfl: 3    ondansetron (ZOFRAN) 8 MG tablet, Take 1 tablet (8 mg total) by mouth every 8 (eight) hours as needed., Disp: 30 tablet, Rfl: 5    potassium chloride (K-TAB) 20 mEq, Take 1 tablet by mouth once daily., Disp: , Rfl:     potassium chloride SA (K-DUR,KLOR-CON) 20 MEQ tablet, Take 1 tablet daily, Disp: 90 tablet, Rfl: 1    promethazine (PHENERGAN) 25 MG tablet, Take 1 tablet (25 mg total) by mouth every 4 (four) hours as needed for Nausea., Disp: 30 tablet, Rfl: 5    VIT A/VIT C/VIT E/ZINC/COPPER (ICAPS AREDS ORAL), Take 1 capsule by mouth 2 (two) times a day. , Disp: , Rfl:     Current Facility-Administered Medications:     diphenhydrAMINE injection 25 mg, 25 mg, Intravenous, Once, Patito Gibson NP-C    diphenhydrAMINE injection 25 mg, 25 mg, Intravenous, PRN, Patito Gibson NP-C        Objective:       Physical Examination:     /70   Pulse (!) 138   Temp 97.7 °F (36.5 °C)   Resp 17   Wt 56.6 kg (124 lb 11.2 oz)   BMI 22.09 kg/m²     Physical Exam  Constitutional:       Appearance: Normal appearance.   HENT:      Head: Normocephalic and atraumatic.   Eyes:      General: No scleral icterus.      Conjunctiva/sclera: Conjunctivae normal.   Cardiovascular:      Rate and Rhythm: Normal rate.   Pulmonary:      Effort: Pulmonary effort is normal.   Abdominal:      General: Abdomen is flat.   Neurological:      General: No focal deficit present.      Mental Status: She is alert and oriented to person, place, and time.   Psychiatric:         Mood and Affect: Mood normal.         Behavior: Behavior normal.         Thought Content: Thought content normal.         Judgment: Judgment normal.         Labs:   Recent Results (from the past 2 weeks)   CBC W/ AUTO DIFFERENTIAL    Collection Time: 09/25/24  1:12 PM   Result Value Ref Range    WBC 11.05 3.90 - 12.70 K/uL    Hemoglobin 7.6 (L) 12.0 - 16.0 g/dL    Hematocrit 25.6 (L) 37.0 - 48.5 %    Platelets 329 150 - 450 K/uL     CMP  Sodium   Date Value Ref Range Status   09/26/2024 140 136 - 145 mmol/L Final     Potassium   Date Value Ref Range Status   09/26/2024 5.0 3.5 - 5.1 mmol/L Final     Chloride   Date Value Ref Range Status   09/26/2024 105 95 - 110 mmol/L Final     CO2   Date Value Ref Range Status   09/26/2024 23 23 - 29 mmol/L Final     Glucose   Date Value Ref Range Status   09/26/2024 149 (H) 70 - 110 mg/dL Final     BUN   Date Value Ref Range Status   09/26/2024 20 8 - 23 mg/dL Final     Creatinine   Date Value Ref Range Status   09/26/2024 1.2 0.5 - 1.4 mg/dL Final     Calcium   Date Value Ref Range Status   09/26/2024 9.1 8.7 - 10.5 mg/dL Final     Total Protein   Date Value Ref Range Status   09/26/2024 6.6 6.0 - 8.4 g/dL Final     Albumin   Date Value Ref Range Status   09/26/2024 2.4 (L) 3.5 - 5.2 g/dL Final     Total Bilirubin   Date Value Ref Range Status   09/26/2024 0.3 0.1 - 1.0 mg/dL Final     Comment:     For infants and newborns, interpretation of results should be based  on gestational age, weight and in agreement with clinical  observations.    Premature Infant recommended reference ranges:  Up to 24 hours.............<8.0 mg/dL  Up to 48  "hours............<12.0 mg/dL  3-5 days..................<15.0 mg/dL  6-29 days.................<15.0 mg/dL       Alkaline Phosphatase   Date Value Ref Range Status   09/26/2024 57 55 - 135 U/L Final     AST   Date Value Ref Range Status   09/26/2024 17 10 - 40 U/L Final     ALT   Date Value Ref Range Status   09/26/2024 11 10 - 44 U/L Final     Anion Gap   Date Value Ref Range Status   09/26/2024 12 8 - 16 mmol/L Final     eGFR if    Date Value Ref Range Status   04/28/2022 40.6 (A) >60 mL/min/1.73 m^2 Final     eGFR if non    Date Value Ref Range Status   04/28/2022 35.3 (A) >60 mL/min/1.73 m^2 Final     Comment:     Calculation used to obtain the estimated glomerular filtration  rate (eGFR) is the CKD-EPI equation.        Lab Results   Component Value Date    CEA 1.3 07/23/2020     No results found for: "PSA"        Assessment/Plan:     Problem List Items Addressed This Visit       Tachycardia     Her HR is in the 130s but she is feeling fine with no symptoms.  Will message her cardiology group for recommendations.  Continue close monitoring as well.     Manual recheck done personally was 110.  Dr. Villanueva notified and will reach out to patient.          Primary malignant neoplasm of bronchus of right lower lobe - Primary     Patient is doing well with this maintenance therapy with apparent good response of the cancer on imaging.  She is having a more difficult time tolerating this so will begin her treatments now at 28 day cycle.  Discussed this today and will continue to watch her very closely.  Continue weekly labs.  Note is made of chemo induced anemia and will give blood transfusions as needed.                     Discussion:     No follow-ups on file.      Electronically signed by Virgil Lloyd      "

## 2024-10-07 ENCOUNTER — OFFICE VISIT (OUTPATIENT)
Dept: FAMILY MEDICINE | Facility: CLINIC | Age: 84
End: 2024-10-07
Payer: MEDICARE

## 2024-10-07 ENCOUNTER — HOSPITAL ENCOUNTER (INPATIENT)
Facility: HOSPITAL | Age: 84
LOS: 4 days | Discharge: HOME HEALTH | DRG: 308 | End: 2024-10-11
Attending: STUDENT IN AN ORGANIZED HEALTH CARE EDUCATION/TRAINING PROGRAM | Admitting: HOSPITALIST
Payer: MEDICARE

## 2024-10-07 VITALS
HEIGHT: 63 IN | TEMPERATURE: 98 F | HEART RATE: 69 BPM | SYSTOLIC BLOOD PRESSURE: 124 MMHG | BODY MASS INDEX: 21.13 KG/M2 | WEIGHT: 119.25 LBS | OXYGEN SATURATION: 91 % | DIASTOLIC BLOOD PRESSURE: 70 MMHG

## 2024-10-07 DIAGNOSIS — R19.7 DIARRHEA, UNSPECIFIED TYPE: ICD-10-CM

## 2024-10-07 DIAGNOSIS — I48.91 ATRIAL FIBRILLATION, UNSPECIFIED TYPE: Primary | ICD-10-CM

## 2024-10-07 DIAGNOSIS — I48.91 ATRIAL FIBRILLATION WITH RAPID VENTRICULAR RESPONSE: ICD-10-CM

## 2024-10-07 DIAGNOSIS — R53.83 FATIGUE, UNSPECIFIED TYPE: ICD-10-CM

## 2024-10-07 DIAGNOSIS — I48.91 ATRIAL FIBRILLATION WITH RVR: ICD-10-CM

## 2024-10-07 DIAGNOSIS — R07.9 CHEST PAIN: ICD-10-CM

## 2024-10-07 DIAGNOSIS — R10.9 ABDOMINAL PAIN, UNSPECIFIED ABDOMINAL LOCATION: Primary | ICD-10-CM

## 2024-10-07 DIAGNOSIS — R10.9 ABDOMINAL PAIN, UNSPECIFIED ABDOMINAL LOCATION: ICD-10-CM

## 2024-10-07 DIAGNOSIS — J18.9 PNEUMONIA OF BOTH LUNGS DUE TO INFECTIOUS ORGANISM, UNSPECIFIED PART OF LUNG: ICD-10-CM

## 2024-10-07 PROBLEM — C34.91: Status: ACTIVE | Noted: 2024-10-07

## 2024-10-07 PROBLEM — R10.84 GENERALIZED ABDOMINAL PAIN: Status: ACTIVE | Noted: 2024-10-07

## 2024-10-07 LAB
ADENOVIRUS: NOT DETECTED
ALBUMIN SERPL BCP-MCNC: 3 G/DL (ref 3.5–5.2)
ALP SERPL-CCNC: 49 U/L (ref 55–135)
ALT SERPL W/O P-5'-P-CCNC: 13 U/L (ref 10–44)
ANION GAP SERPL CALC-SCNC: 9 MMOL/L (ref 8–16)
AST SERPL-CCNC: 13 U/L (ref 10–40)
BACTERIA #/AREA URNS HPF: NORMAL /HPF
BASOPHILS # BLD AUTO: 0.01 K/UL (ref 0–0.2)
BASOPHILS NFR BLD: 0.3 % (ref 0–1.9)
BILIRUB SERPL-MCNC: 0.5 MG/DL (ref 0.1–1)
BILIRUB UR QL STRIP: NEGATIVE
BNP SERPL-MCNC: 532 PG/ML (ref 0–99)
BORDETELLA PARAPERTUSSIS (IS1001): NOT DETECTED
BORDETELLA PERTUSSIS (PTXP): NOT DETECTED
BUN SERPL-MCNC: 28 MG/DL (ref 8–23)
CALCIUM SERPL-MCNC: 8.9 MG/DL (ref 8.7–10.5)
CHLAMYDIA PNEUMONIAE: NOT DETECTED
CHLORIDE SERPL-SCNC: 99 MMOL/L (ref 95–110)
CLARITY UR: CLEAR
CO2 SERPL-SCNC: 29 MMOL/L (ref 23–29)
COLOR UR: YELLOW
CORONAVIRUS 229E, COMMON COLD VIRUS: NOT DETECTED
CORONAVIRUS HKU1, COMMON COLD VIRUS: NOT DETECTED
CORONAVIRUS NL63, COMMON COLD VIRUS: NOT DETECTED
CORONAVIRUS OC43, COMMON COLD VIRUS: NOT DETECTED
CREAT SERPL-MCNC: 1.1 MG/DL (ref 0.5–1.4)
CREAT SERPL-MCNC: 1.2 MG/DL (ref 0.5–1.4)
DIFFERENTIAL METHOD BLD: ABNORMAL
EOSINOPHIL # BLD AUTO: 0 K/UL (ref 0–0.5)
EOSINOPHIL NFR BLD: 0.3 % (ref 0–8)
ERYTHROCYTE [DISTWIDTH] IN BLOOD BY AUTOMATED COUNT: 18.3 % (ref 11.5–14.5)
EST. GFR  (NO RACE VARIABLE): 44.9 ML/MIN/1.73 M^2
FLUBV RNA NPH QL NAA+NON-PROBE: NOT DETECTED
GLUCOSE SERPL-MCNC: 109 MG/DL (ref 70–110)
GLUCOSE UR QL STRIP: NEGATIVE
HCT VFR BLD AUTO: 33.8 % (ref 37–48.5)
HGB BLD-MCNC: 10.1 G/DL (ref 12–16)
HGB UR QL STRIP: ABNORMAL
HPIV1 RNA NPH QL NAA+NON-PROBE: NOT DETECTED
HPIV2 RNA NPH QL NAA+NON-PROBE: NOT DETECTED
HPIV3 RNA NPH QL NAA+NON-PROBE: NOT DETECTED
HPIV4 RNA NPH QL NAA+NON-PROBE: NOT DETECTED
HUMAN METAPNEUMOVIRUS: NOT DETECTED
HYALINE CASTS #/AREA URNS LPF: 0 /LPF
IMM GRANULOCYTES # BLD AUTO: 0.05 K/UL (ref 0–0.04)
IMM GRANULOCYTES NFR BLD AUTO: 1.5 % (ref 0–0.5)
INFLUENZA A (SUBTYPES H1,H1-2009,H3): NOT DETECTED
INFLUENZA A, MOLECULAR: NEGATIVE
INFLUENZA B, MOLECULAR: NEGATIVE
INR PPP: 1.1 (ref 0.8–1.2)
KETONES UR QL STRIP: NEGATIVE
LDH SERPL L TO P-CCNC: 1.01 MMOL/L (ref 0.5–2.2)
LEUKOCYTE ESTERASE UR QL STRIP: NEGATIVE
LIPASE SERPL-CCNC: 40 U/L (ref 4–60)
LYMPHOCYTES # BLD AUTO: 0.3 K/UL (ref 1–4.8)
LYMPHOCYTES NFR BLD: 9.1 % (ref 18–48)
MAGNESIUM SERPL-MCNC: 1.8 MG/DL (ref 1.6–2.6)
MCH RBC QN AUTO: 30.8 PG (ref 27–31)
MCHC RBC AUTO-ENTMCNC: 29.9 G/DL (ref 32–36)
MCV RBC AUTO: 103 FL (ref 82–98)
MICROSCOPIC COMMENT: NORMAL
MONOCYTES # BLD AUTO: 0.2 K/UL (ref 0.3–1)
MONOCYTES NFR BLD: 6.1 % (ref 4–15)
MYCOPLASMA PNEUMONIAE: NOT DETECTED
NEUTROPHILS # BLD AUTO: 2.7 K/UL (ref 1.8–7.7)
NEUTROPHILS NFR BLD: 82.7 % (ref 38–73)
NITRITE UR QL STRIP: NEGATIVE
NRBC BLD-RTO: 0 /100 WBC
PH UR STRIP: 6 [PH] (ref 5–8)
PHOSPHATE SERPL-MCNC: 3.2 MG/DL (ref 2.7–4.5)
PLATELET # BLD AUTO: 171 K/UL (ref 150–450)
PMV BLD AUTO: 9.3 FL (ref 9.2–12.9)
POTASSIUM SERPL-SCNC: 4.1 MMOL/L (ref 3.5–5.1)
PROT SERPL-MCNC: 6.9 G/DL (ref 6–8.4)
PROT UR QL STRIP: ABNORMAL
PROTHROMBIN TIME: 12.4 SEC (ref 9–12.5)
RBC # BLD AUTO: 3.28 M/UL (ref 4–5.4)
RBC #/AREA URNS HPF: 2 /HPF (ref 0–4)
RESPIRATORY INFECTION PANEL SOURCE: NORMAL
RSV RNA NPH QL NAA+NON-PROBE: NOT DETECTED
RV+EV RNA NPH QL NAA+NON-PROBE: NOT DETECTED
SAMPLE: NORMAL
SAMPLE: NORMAL
SARS-COV-2 RDRP RESP QL NAA+PROBE: NEGATIVE
SARS-COV-2 RNA RESP QL NAA+PROBE: NOT DETECTED
SODIUM SERPL-SCNC: 137 MMOL/L (ref 136–145)
SP GR UR STRIP: 1.03 (ref 1–1.03)
SPECIMEN SOURCE: NORMAL
SQUAMOUS #/AREA URNS HPF: 4 /HPF
TROPONIN I SERPL HS-MCNC: 13.3 PG/ML (ref 0–14.9)
TSH SERPL DL<=0.005 MIU/L-ACNC: 0.88 UIU/ML (ref 0.34–5.6)
URN SPEC COLLECT METH UR: ABNORMAL
UROBILINOGEN UR STRIP-ACNC: NEGATIVE EU/DL
WBC # BLD AUTO: 3.29 K/UL (ref 3.9–12.7)
WBC #/AREA URNS HPF: 2 /HPF (ref 0–5)

## 2024-10-07 PROCEDURE — 85610 PROTHROMBIN TIME: CPT | Performed by: STUDENT IN AN ORGANIZED HEALTH CARE EDUCATION/TRAINING PROGRAM

## 2024-10-07 PROCEDURE — 93005 ELECTROCARDIOGRAM TRACING: CPT | Performed by: GENERAL PRACTICE

## 2024-10-07 PROCEDURE — 1157F ADVNC CARE PLAN IN RCRD: CPT | Mod: HCNC,CPTII,S$GLB, | Performed by: NURSE PRACTITIONER

## 2024-10-07 PROCEDURE — 21000000 HC CCU ICU ROOM CHARGE

## 2024-10-07 PROCEDURE — U0002 COVID-19 LAB TEST NON-CDC: HCPCS | Performed by: STUDENT IN AN ORGANIZED HEALTH CARE EDUCATION/TRAINING PROGRAM

## 2024-10-07 PROCEDURE — 1125F AMNT PAIN NOTED PAIN PRSNT: CPT | Mod: HCNC,CPTII,S$GLB, | Performed by: NURSE PRACTITIONER

## 2024-10-07 PROCEDURE — 93005 ELECTROCARDIOGRAM TRACING: CPT | Mod: HCNC,S$GLB,, | Performed by: NURSE PRACTITIONER

## 2024-10-07 PROCEDURE — 63600175 PHARM REV CODE 636 W HCPCS: Performed by: STUDENT IN AN ORGANIZED HEALTH CARE EDUCATION/TRAINING PROGRAM

## 2024-10-07 PROCEDURE — 99285 EMERGENCY DEPT VISIT HI MDM: CPT | Mod: 25

## 2024-10-07 PROCEDURE — 87040 BLOOD CULTURE FOR BACTERIA: CPT | Performed by: STUDENT IN AN ORGANIZED HEALTH CARE EDUCATION/TRAINING PROGRAM

## 2024-10-07 PROCEDURE — 96366 THER/PROPH/DIAG IV INF ADDON: CPT

## 2024-10-07 PROCEDURE — 82565 ASSAY OF CREATININE: CPT

## 2024-10-07 PROCEDURE — 3078F DIAST BP <80 MM HG: CPT | Mod: HCNC,CPTII,S$GLB, | Performed by: NURSE PRACTITIONER

## 2024-10-07 PROCEDURE — 83690 ASSAY OF LIPASE: CPT | Performed by: STUDENT IN AN ORGANIZED HEALTH CARE EDUCATION/TRAINING PROGRAM

## 2024-10-07 PROCEDURE — 25500020 PHARM REV CODE 255: Performed by: STUDENT IN AN ORGANIZED HEALTH CARE EDUCATION/TRAINING PROGRAM

## 2024-10-07 PROCEDURE — 87502 INFLUENZA DNA AMP PROBE: CPT | Performed by: STUDENT IN AN ORGANIZED HEALTH CARE EDUCATION/TRAINING PROGRAM

## 2024-10-07 PROCEDURE — 84100 ASSAY OF PHOSPHORUS: CPT | Performed by: STUDENT IN AN ORGANIZED HEALTH CARE EDUCATION/TRAINING PROGRAM

## 2024-10-07 PROCEDURE — 93010 ELECTROCARDIOGRAM REPORT: CPT | Mod: ,,, | Performed by: GENERAL PRACTICE

## 2024-10-07 PROCEDURE — 84484 ASSAY OF TROPONIN QUANT: CPT | Performed by: STUDENT IN AN ORGANIZED HEALTH CARE EDUCATION/TRAINING PROGRAM

## 2024-10-07 PROCEDURE — 85025 COMPLETE CBC W/AUTO DIFF WBC: CPT | Performed by: STUDENT IN AN ORGANIZED HEALTH CARE EDUCATION/TRAINING PROGRAM

## 2024-10-07 PROCEDURE — 99214 OFFICE O/P EST MOD 30 MIN: CPT | Mod: HCNC,S$GLB,, | Performed by: NURSE PRACTITIONER

## 2024-10-07 PROCEDURE — 96375 TX/PRO/DX INJ NEW DRUG ADDON: CPT

## 2024-10-07 PROCEDURE — 3288F FALL RISK ASSESSMENT DOCD: CPT | Mod: HCNC,CPTII,S$GLB, | Performed by: NURSE PRACTITIONER

## 2024-10-07 PROCEDURE — 81001 URINALYSIS AUTO W/SCOPE: CPT | Performed by: STUDENT IN AN ORGANIZED HEALTH CARE EDUCATION/TRAINING PROGRAM

## 2024-10-07 PROCEDURE — 93010 ELECTROCARDIOGRAM REPORT: CPT | Mod: 76,,, | Performed by: GENERAL PRACTICE

## 2024-10-07 PROCEDURE — 25000003 PHARM REV CODE 250: Performed by: HOSPITALIST

## 2024-10-07 PROCEDURE — 36415 COLL VENOUS BLD VENIPUNCTURE: CPT | Performed by: STUDENT IN AN ORGANIZED HEALTH CARE EDUCATION/TRAINING PROGRAM

## 2024-10-07 PROCEDURE — 83880 ASSAY OF NATRIURETIC PEPTIDE: CPT | Performed by: STUDENT IN AN ORGANIZED HEALTH CARE EDUCATION/TRAINING PROGRAM

## 2024-10-07 PROCEDURE — 93010 ELECTROCARDIOGRAM REPORT: CPT | Mod: HCWC,S$GLB,, | Performed by: INTERNAL MEDICINE

## 2024-10-07 PROCEDURE — 84443 ASSAY THYROID STIM HORMONE: CPT | Performed by: STUDENT IN AN ORGANIZED HEALTH CARE EDUCATION/TRAINING PROGRAM

## 2024-10-07 PROCEDURE — 96365 THER/PROPH/DIAG IV INF INIT: CPT

## 2024-10-07 PROCEDURE — 1101F PT FALLS ASSESS-DOCD LE1/YR: CPT | Mod: HCNC,CPTII,S$GLB, | Performed by: NURSE PRACTITIONER

## 2024-10-07 PROCEDURE — 87486 CHLMYD PNEUM DNA AMP PROBE: CPT | Performed by: NURSE PRACTITIONER

## 2024-10-07 PROCEDURE — 25000003 PHARM REV CODE 250: Performed by: STUDENT IN AN ORGANIZED HEALTH CARE EDUCATION/TRAINING PROGRAM

## 2024-10-07 PROCEDURE — 25000003 PHARM REV CODE 250: Performed by: NURSE PRACTITIONER

## 2024-10-07 PROCEDURE — 96367 TX/PROPH/DG ADDL SEQ IV INF: CPT

## 2024-10-07 PROCEDURE — 83735 ASSAY OF MAGNESIUM: CPT | Performed by: STUDENT IN AN ORGANIZED HEALTH CARE EDUCATION/TRAINING PROGRAM

## 2024-10-07 PROCEDURE — 96376 TX/PRO/DX INJ SAME DRUG ADON: CPT

## 2024-10-07 PROCEDURE — 99999 PR PBB SHADOW E&M-EST. PATIENT-LVL V: CPT | Mod: PBBFAC,HCNC,, | Performed by: NURSE PRACTITIONER

## 2024-10-07 PROCEDURE — 3074F SYST BP LT 130 MM HG: CPT | Mod: HCNC,CPTII,S$GLB, | Performed by: NURSE PRACTITIONER

## 2024-10-07 PROCEDURE — 80053 COMPREHEN METABOLIC PANEL: CPT | Performed by: STUDENT IN AN ORGANIZED HEALTH CARE EDUCATION/TRAINING PROGRAM

## 2024-10-07 PROCEDURE — 87798 DETECT AGENT NOS DNA AMP: CPT | Performed by: NURSE PRACTITIONER

## 2024-10-07 PROCEDURE — 87581 M.PNEUMON DNA AMP PROBE: CPT | Performed by: NURSE PRACTITIONER

## 2024-10-07 RX ORDER — LANOLIN ALCOHOL/MO/W.PET/CERES
800 CREAM (GRAM) TOPICAL
Status: DISCONTINUED | OUTPATIENT
Start: 2024-10-07 | End: 2024-10-11 | Stop reason: HOSPADM

## 2024-10-07 RX ORDER — SODIUM,POTASSIUM PHOSPHATES 280-250MG
2 POWDER IN PACKET (EA) ORAL
Status: DISCONTINUED | OUTPATIENT
Start: 2024-10-07 | End: 2024-10-11 | Stop reason: HOSPADM

## 2024-10-07 RX ORDER — ASPIRIN 81 MG/1
81 TABLET ORAL DAILY
Status: DISCONTINUED | OUTPATIENT
Start: 2024-10-08 | End: 2024-10-08

## 2024-10-07 RX ORDER — MORPHINE SULFATE 2 MG/ML
1 INJECTION, SOLUTION INTRAMUSCULAR; INTRAVENOUS EVERY 4 HOURS PRN
Status: DISCONTINUED | OUTPATIENT
Start: 2024-10-07 | End: 2024-10-11 | Stop reason: HOSPADM

## 2024-10-07 RX ORDER — CLOPIDOGREL BISULFATE 75 MG/1
75 TABLET ORAL DAILY
Status: DISCONTINUED | OUTPATIENT
Start: 2024-10-08 | End: 2024-10-08

## 2024-10-07 RX ORDER — MORPHINE SULFATE 2 MG/ML
1 INJECTION, SOLUTION INTRAMUSCULAR; INTRAVENOUS EVERY 4 HOURS PRN
Status: DISCONTINUED | OUTPATIENT
Start: 2024-10-07 | End: 2024-10-07

## 2024-10-07 RX ORDER — ACETAMINOPHEN 325 MG/1
650 TABLET ORAL EVERY 4 HOURS PRN
Status: DISCONTINUED | OUTPATIENT
Start: 2024-10-07 | End: 2024-10-11 | Stop reason: HOSPADM

## 2024-10-07 RX ORDER — HYDROCODONE BITARTRATE AND ACETAMINOPHEN 10; 325 MG/1; MG/1
1 TABLET ORAL EVERY 6 HOURS PRN
Status: DISCONTINUED | OUTPATIENT
Start: 2024-10-07 | End: 2024-10-07

## 2024-10-07 RX ORDER — METOPROLOL TARTRATE 1 MG/ML
5 INJECTION, SOLUTION INTRAVENOUS EVERY 5 MIN PRN
Status: COMPLETED | OUTPATIENT
Start: 2024-10-07 | End: 2024-10-07

## 2024-10-07 RX ORDER — LANOLIN ALCOHOL/MO/W.PET/CERES
400 CREAM (GRAM) TOPICAL DAILY
Status: DISCONTINUED | OUTPATIENT
Start: 2024-10-08 | End: 2024-10-11 | Stop reason: HOSPADM

## 2024-10-07 RX ORDER — LORAZEPAM 1 MG/1
1 TABLET ORAL EVERY 6 HOURS PRN
Status: CANCELLED | OUTPATIENT
Start: 2024-10-07

## 2024-10-07 RX ORDER — VANCOMYCIN HCL IN 5 % DEXTROSE 1G/250ML
20 PLASTIC BAG, INJECTION (ML) INTRAVENOUS ONCE
Status: COMPLETED | OUTPATIENT
Start: 2024-10-07 | End: 2024-10-07

## 2024-10-07 RX ORDER — IBUPROFEN 200 MG
16 TABLET ORAL
Status: DISCONTINUED | OUTPATIENT
Start: 2024-10-07 | End: 2024-10-11 | Stop reason: HOSPADM

## 2024-10-07 RX ORDER — TALC
6 POWDER (GRAM) TOPICAL NIGHTLY PRN
Status: DISCONTINUED | OUTPATIENT
Start: 2024-10-07 | End: 2024-10-11 | Stop reason: HOSPADM

## 2024-10-07 RX ORDER — AMIODARONE HYDROCHLORIDE 200 MG/1
200 TABLET ORAL DAILY
Status: DISCONTINUED | OUTPATIENT
Start: 2024-10-08 | End: 2024-10-07

## 2024-10-07 RX ORDER — METOPROLOL SUCCINATE 25 MG/1
25 TABLET, EXTENDED RELEASE ORAL DAILY
Status: DISCONTINUED | OUTPATIENT
Start: 2024-10-08 | End: 2024-10-08

## 2024-10-07 RX ORDER — MUPIROCIN 20 MG/G
OINTMENT TOPICAL 2 TIMES DAILY
Status: DISCONTINUED | OUTPATIENT
Start: 2024-10-07 | End: 2024-10-11 | Stop reason: HOSPADM

## 2024-10-07 RX ORDER — GLUCAGON 1 MG
1 KIT INJECTION
Status: DISCONTINUED | OUTPATIENT
Start: 2024-10-07 | End: 2024-10-11 | Stop reason: HOSPADM

## 2024-10-07 RX ORDER — IBUPROFEN 200 MG
24 TABLET ORAL
Status: DISCONTINUED | OUTPATIENT
Start: 2024-10-07 | End: 2024-10-11 | Stop reason: HOSPADM

## 2024-10-07 RX ORDER — GABAPENTIN 100 MG/1
100 CAPSULE ORAL 2 TIMES DAILY
Status: DISCONTINUED | OUTPATIENT
Start: 2024-10-07 | End: 2024-10-11 | Stop reason: HOSPADM

## 2024-10-07 RX ORDER — ONDANSETRON HYDROCHLORIDE 2 MG/ML
4 INJECTION, SOLUTION INTRAVENOUS EVERY 6 HOURS PRN
Status: DISCONTINUED | OUTPATIENT
Start: 2024-10-07 | End: 2024-10-11 | Stop reason: HOSPADM

## 2024-10-07 RX ORDER — HYDROCODONE BITARTRATE AND ACETAMINOPHEN 5; 325 MG/1; MG/1
1 TABLET ORAL EVERY 6 HOURS PRN
Status: DISCONTINUED | OUTPATIENT
Start: 2024-10-07 | End: 2024-10-11 | Stop reason: HOSPADM

## 2024-10-07 RX ORDER — ALUMINUM HYDROXIDE, MAGNESIUM HYDROXIDE, AND SIMETHICONE 1200; 120; 1200 MG/30ML; MG/30ML; MG/30ML
30 SUSPENSION ORAL 4 TIMES DAILY PRN
Status: DISCONTINUED | OUTPATIENT
Start: 2024-10-07 | End: 2024-10-11 | Stop reason: HOSPADM

## 2024-10-07 RX ORDER — ACETAMINOPHEN 325 MG/1
650 TABLET ORAL EVERY 8 HOURS PRN
Status: DISCONTINUED | OUTPATIENT
Start: 2024-10-07 | End: 2024-10-11 | Stop reason: HOSPADM

## 2024-10-07 RX ORDER — FOLIC ACID 1 MG/1
1 TABLET ORAL DAILY
Status: DISCONTINUED | OUTPATIENT
Start: 2024-10-08 | End: 2024-10-11 | Stop reason: HOSPADM

## 2024-10-07 RX ORDER — IPRATROPIUM BROMIDE AND ALBUTEROL SULFATE 2.5; .5 MG/3ML; MG/3ML
3 SOLUTION RESPIRATORY (INHALATION) EVERY 6 HOURS PRN
Status: DISCONTINUED | OUTPATIENT
Start: 2024-10-07 | End: 2024-10-11 | Stop reason: HOSPADM

## 2024-10-07 RX ORDER — SODIUM CHLORIDE 0.9 % (FLUSH) 0.9 %
2 SYRINGE (ML) INJECTION
Status: DISCONTINUED | OUTPATIENT
Start: 2024-10-07 | End: 2024-10-11 | Stop reason: HOSPADM

## 2024-10-07 RX ORDER — NALOXONE HCL 0.4 MG/ML
0.02 VIAL (ML) INJECTION
Status: DISCONTINUED | OUTPATIENT
Start: 2024-10-07 | End: 2024-10-11 | Stop reason: HOSPADM

## 2024-10-07 RX ORDER — METOPROLOL TARTRATE 1 MG/ML
5 INJECTION, SOLUTION INTRAVENOUS
Status: COMPLETED | OUTPATIENT
Start: 2024-10-07 | End: 2024-10-07

## 2024-10-07 RX ORDER — LISINOPRIL 20 MG/1
40 TABLET ORAL DAILY
Status: DISCONTINUED | OUTPATIENT
Start: 2024-10-08 | End: 2024-10-08

## 2024-10-07 RX ORDER — DILTIAZEM HYDROCHLORIDE 5 MG/ML
0.25 INJECTION INTRAVENOUS ONCE
Status: COMPLETED | OUTPATIENT
Start: 2024-10-07 | End: 2024-10-07

## 2024-10-07 RX ADMIN — VANCOMYCIN HYDROCHLORIDE 1000 MG: 1 INJECTION, POWDER, LYOPHILIZED, FOR SOLUTION INTRAVENOUS at 10:10

## 2024-10-07 RX ADMIN — CEFEPIME 2 G: 2 INJECTION, POWDER, FOR SOLUTION INTRAVENOUS at 09:10

## 2024-10-07 RX ADMIN — METOPROLOL TARTRATE 5 MG: 1 INJECTION, SOLUTION INTRAVENOUS at 05:10

## 2024-10-07 RX ADMIN — GABAPENTIN 100 MG: 100 CAPSULE ORAL at 09:10

## 2024-10-07 RX ADMIN — DILTIAZEM HYDROCHLORIDE 13.5 MG: 5 INJECTION INTRAVENOUS at 09:10

## 2024-10-07 RX ADMIN — MUPIROCIN 1 G: 20 OINTMENT TOPICAL at 09:10

## 2024-10-07 RX ADMIN — APIXABAN 5 MG: 5 TABLET, FILM COATED ORAL at 09:10

## 2024-10-07 RX ADMIN — METOPROLOL TARTRATE 5 MG: 1 INJECTION, SOLUTION INTRAVENOUS at 07:10

## 2024-10-07 RX ADMIN — IOHEXOL 100 ML: 350 INJECTION, SOLUTION INTRAVENOUS at 06:10

## 2024-10-07 RX ADMIN — PIPERACILLIN SODIUM AND TAZOBACTAM SODIUM 4.5 G: 4; .5 INJECTION, POWDER, LYOPHILIZED, FOR SOLUTION INTRAVENOUS at 07:10

## 2024-10-07 RX ADMIN — METOPROLOL TARTRATE 5 MG: 1 INJECTION, SOLUTION INTRAVENOUS at 08:10

## 2024-10-07 RX ADMIN — DEXTROSE MONOHYDRATE 2.5 MG/HR: 50 INJECTION, SOLUTION INTRAVENOUS at 09:10

## 2024-10-07 NOTE — PROGRESS NOTES
Subjective:       Patient ID: Alexa Otero is a 84 y.o. female.    Chief Complaint: Abdominal Pain, Fatigue, Diarrhea, and Nausea     HPI:   85 y/o female patient with right lower lung cancer following pulmonary and hematology/oncology and currently on chemo q 4 weeks and chronic hypoxia with home oxygen 2L per NC presents for fatigue, belching, b/l abdominal soreness, nausea, off and on diarrhea, and fatigue for 2 weeks. Patient is here with . Patient is on chemo tx q 4 weeks and states last chemo was 2 weeks ago. Denies dizziness, chest pain, sob, palpitation. Patient states takes eliquis, aspirin and plavix for a fib.   Patient is on toprol xl 25 mg qd and amiodarone.     Labs reviewed- Hgb/Hct 7.6/25.6    Patient Active Problem List   Diagnosis    Sciatica    Syncope and collapse    Polycythemia, secondary    Hyperlipidemia associated with type 2 diabetes mellitus    Controlled type 2 diabetes mellitus with stage 3 chronic kidney disease, without long-term current use of insulin    Hypertension associated with diabetes    Vitamin D deficiency disease    Gastroesophageal reflux disease    History of Kwon's esophagus    Pancreatic pseudocyst/cyst    Hepatic cyst    Low back pain radiating to both legs    Primary osteoarthritis of right ankle    Kwon's esophagus    Chronic low back pain    Dupuytren's contracture of both hands    Right lower quadrant abdominal swelling, mass and lump    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     Closed fracture of femur, intertrochanteric, right, with routine healing, subsequent encounter    Right hip pain    Weakness of right lower extremity    Impaired functional mobility and activity tolerance    Maintenance chemotherapy    Atherosclerosis of aorta    History of colon polyps    Hypomagnesemia    Hypokalemia    Cardiopulmonary arrest    ACP (advance care planning)    Postoperative atrial fibrillation    Lung infiltrate    Status post lobectomy of lung     Malignant neoplasm of lower lobe of right lung    Pleural effusion    Epigastric discomfort    Iron deficiency anemia due to chronic blood loss    Primary malignant neoplasm of bronchus of right lower lobe    Oxygen dependent    Thrombocytopenia    Coronary artery disease    Peripheral neuropathy due to chemotherapy    S/P angioplasty with stent    Chronic obstructive pulmonary disease, unspecified COPD type    Sacroiliitis, not elsewhere classified    Pleural effusion, right    HTN (hypertension)    Mixed hyperlipidemia    Tachycardia      Review of patient's allergies indicates:  No Known Allergies    Past Surgical History:   Procedure Laterality Date    AUGMENTATION OF BREAST      BRONCHOSCOPY N/A 12/12/2023    Procedure: BRONCHOSCOPY;  Surgeon: Neva Christian MD;  Location: [facility];  Service: ENT;  Laterality: N/A;    BRONCHOSCOPY WITH FLUOROSCOPY Right 05/11/2023    Procedure: BRONCHOSCOPY, WITH FLUOROSCOPY;  Surgeon: Neva Christian MD;  Location: [facility] ENDO;  Service: Pulmonary;  Laterality: Right;    BRONCHOSCOPY WITH FLUOROSCOPY N/A 12/15/2023    Procedure: BRONCHOSCOPY, WITH FLUOROSCOPY;  Surgeon: Neva Christian MD;  Location: [facility];  Service: Pulmonary;  Laterality: N/A;    CATARACT EXTRACTION BILATERAL W/ ANTERIOR VITRECTOMY Bilateral     CHOLECYSTECTOMY      COLONOSCOPY      COLONOSCOPY N/A 04/20/2023    Procedure: COLONOSCOPY;  Surgeon: Sabino Stephenson MD;  Location: [facility];  Service: Endoscopy;  Laterality: N/A;    CORONARY STENT PLACEMENT  10/2023    x 3    ESOPHAGOGASTRODUODENOSCOPY N/A 06/20/2018    Procedure: EGD (ESOPHAGOGASTRODUODENOSCOPY);  Surgeon: Sabino Stephenson MD;  Location: [facility];  Service: Endoscopy;  Laterality: N/A;    ESOPHAGOGASTRODUODENOSCOPY N/A 03/31/2023    Procedure: EGD (ESOPHAGOGASTRODUODENOSCOPY);  Surgeon: Sabino Stephenson MD;  Location: [facility];  Service: Endoscopy;  Laterality: N/A;    ESOPHAGOGASTRODUODENOSCOPY N/A 12/11/2023    Procedure:  EGD (ESOPHAGOGASTRODUODENOSCOPY);  Surgeon: Pretty Salgado MD;  Location: [facility] ENDO;  Service: Endoscopy;  Laterality: N/A;    HYSTERECTOMY  1976    partial    INJECTION OF ANESTHETIC AGENT AROUND MEDIAL BRANCH NERVES INNERVATING LUMBAR FACET JOINT Right 05/10/2023    Procedure: Block-nerve-Lateral-branch-lumbar;  Surgeon: Agustin Robles MD;  Location: [facility] OR;  Service: Pain Management;  Laterality: Right;  L5 and s1,s2 LBB    INJECTION OF ANESTHETIC AGENT AROUND MEDIAL BRANCH NERVES INNERVATING LUMBAR FACET JOINT Right 05/30/2023    Procedure: Block-nerve-medial branch-lumbar;  Surgeon: Agustin Robles MD;  Location: [facility] OR;  Service: Pain Management;  Laterality: Right;  L5, s1 ,s2 LBB #2    INJECTION OF ANESTHETIC AGENT AROUND NERVE Right 10/31/2023    Procedure: INTERCOSTAL NERVE BLOCK;  Surgeon: Washington Shankar MD;  Location: [facility] OR;  Service: Cardiothoracic;  Laterality: Right;    INJECTION, SACROILIAC JOINT Right 01/26/2023    Procedure: INJECTION,SACROILIAC JOINT;  Surgeon: Agustin Robles MD;  Location: [facility] OR;  Service: Pain Management;  Laterality: Right;    INSERTION OF TUNNELED CENTRAL VENOUS CATHETER (CVC) WITH SUBCUTANEOUS PORT Right 10/19/2020    Procedure: INSERTION, PORT-A-CATH;  Surgeon: Misti Mcfarland MD;  Location: [facility] OR;  Service: General;  Laterality: Right;    INTRAMEDULLARY RODDING OF TROCHANTER OF FEMUR Right 11/28/2021    Procedure: INSERTION, INTRAMEDULLARY LUC, FEMUR, TROCHANTER/RIGHT TFN DR FAJARDO NOTIFIED REP;  Surgeon: Kp Fajardo MD;  Location: [facility] OR;  Service: Orthopedics;  Laterality: Right;  SKIP    ROBOT-ASSISTED LAPAROSCOPIC LYMPHADENECTOMY USING DA NELLA XI N/A 09/21/2020    Procedure: XI ROBOTIC LYMPHADENECTOMY-pelvic and kell-aortic;  Surgeon: Altagracia Ray MD;  Location: [facility] OR;  Service: OB/GYN;  Laterality: N/A;    ROBOT-ASSISTED LAPAROSCOPIC OMENTECTOMY USING DA NELLA XI N/A 09/21/2020    Procedure: XI ROBOTIC OMENTECTOMY;  Surgeon: Altagracia ESQUEDA  MD Flor;  Location: [facility] OR;  Service: OB/GYN;  Laterality: N/A;    ROBOT-ASSISTED LAPAROSCOPIC SALPINGO-OOPHORECTOMY USING DA NELLA XI Bilateral 09/21/2020    Procedure: XI ROBOTIC SALPINGO-OOPHORECTOMY;  Surgeon: Altagracia Ray MD;  Location: [facility] OR;  Service: OB/GYN;  Laterality: Bilateral;    THORACOSCOPIC BIOPSY OF PLEURA Right 10/31/2023    Procedure: VATS, WITH PLEURA BIOPSY;  Surgeon: Washington Shankar MD;  Location: [facility] OR;  Service: Cardiothoracic;  Laterality: Right;  FROZEN SECTION   **KRISTEN-ASSISDT**    THORACOTOMY Right 10/31/2023    Procedure: THORACOTOMY;  Surgeon: Washington Shankar MD;  Location: [facility] OR;  Service: Cardiothoracic;  Laterality: Right;    UPPER GASTROINTESTINAL ENDOSCOPY          Current Facility-Administered Medications:     diphenhydrAMINE injection 25 mg, 25 mg, Intravenous, Once, Patito Gibson NP-SABRINA    diphenhydrAMINE injection 25 mg, 25 mg, Intravenous, PRN, Patito Gibson NP-SABRINA    Current Outpatient Medications:     amiodarone (PACERONE) 200 MG Tab, Take 1 tablet (200 mg total) by mouth once daily., Disp: 60 tablet, Rfl: 11    apixaban (ELIQUIS) 5 mg Tab, Take 1 tablet (5 mg total) by mouth 2 (two) times daily. 2 tablets twice a day for a week, then decrease to 1 tablet twice a day, Disp: 60 tablet, Rfl: 11    aspirin (ECOTRIN) 81 MG EC tablet, Take 81 mg by mouth once daily., Disp: , Rfl:     benazepriL (LOTENSIN) 40 MG tablet, Take 0.5 tablets (20 mg total) by mouth once daily., Disp: 45 tablet, Rfl: 3    clopidogreL (PLAVIX) 75 mg tablet, Take 1 tablet (75 mg total) by mouth once daily., Disp: 90 tablet, Rfl: 2    folic acid (FOLVITE) 1 MG tablet, Take 1 tablet (1 mg total) by mouth once daily., Disp: 100 tablet, Rfl: 3    gabapentin (NEURONTIN) 100 MG capsule, Take 1 capsule (100 mg total) by mouth 2 (two) times daily., Disp: 180 capsule, Rfl: 3    HYDROcodone-acetaminophen (NORCO) 5-325 mg per tablet, Take 1 tablet by mouth every 6 (six) hours as needed for  "Pain., Disp: , Rfl:     magnesium oxide (MAG-OX) 400 mg (241.3 mg magnesium) tablet, Take 1 tablet (400 mg total) by mouth once daily. Patient requested refill, Disp: 90 tablet, Rfl: 3    melatonin 5 mg Chew, Take 1 tablet by mouth nightly as needed (insomnia)., Disp: , Rfl:     metoprolol succinate (TOPROL-XL) 25 MG 24 hr tablet, Take 1 tablet (25 mg total) by mouth once daily., Disp: 90 tablet, Rfl: 3    ondansetron (ZOFRAN) 8 MG tablet, Take 1 tablet (8 mg total) by mouth every 8 (eight) hours as needed., Disp: 30 tablet, Rfl: 5    potassium chloride (K-TAB) 20 mEq, Take 1 tablet by mouth once daily., Disp: , Rfl:     potassium chloride SA (K-DUR,KLOR-CON) 20 MEQ tablet, Take 1 tablet daily, Disp: 90 tablet, Rfl: 1    promethazine (PHENERGAN) 25 MG tablet, Take 1 tablet (25 mg total) by mouth every 4 (four) hours as needed for Nausea., Disp: 30 tablet, Rfl: 5    VIT A/VIT C/VIT E/ZINC/COPPER (ICAPS AREDS ORAL), Take 1 capsule by mouth 2 (two) times a day. , Disp: , Rfl:     LORazepam (ATIVAN) 1 MG tablet, Take 1 tablet (1 mg total) by mouth every 6 (six) hours as needed for Anxiety (insomnia)., Disp: 30 tablet, Rfl: 2    Review of Systems   Constitutional:  Positive for fatigue. Negative for chills and fever.   Respiratory:  Negative for cough and shortness of breath.    Cardiovascular:  Negative for chest pain and palpitations.   Gastrointestinal:  Positive for abdominal pain, diarrhea and nausea.   Neurological:  Negative for dizziness and headaches.       Objective:   /70 (BP Location: Right arm, Patient Position: Sitting)   Pulse 69   Temp 97.9 °F (36.6 °C) (Oral)   Ht 5' 3" (1.6 m)   Wt 54.1 kg (119 lb 4.3 oz)   SpO2 (!) 91%   BMI 21.13 kg/m²         Physical Exam  Constitutional:       Appearance: Normal appearance. She is ill-appearing.   HENT:      Head: Atraumatic.   Cardiovascular:      Rate and Rhythm: Tachycardia present. Rhythm irregular.      Pulses: Normal pulses.      Heart sounds: " Normal heart sounds.   Pulmonary:      Effort: Pulmonary effort is normal.      Breath sounds: Normal breath sounds.   Abdominal:      General: Abdomen is flat. Bowel sounds are normal.      Palpations: Abdomen is soft.      Tenderness: There is abdominal tenderness.   Neurological:      Mental Status: She is oriented to person, place, and time.         Assessment:       1. Abdominal pain, unspecified abdominal location    2. Diarrhea, unspecified type    3. Fatigue, unspecified type    4. Atrial fibrillation with rapid ventricular response        Plan:       1. Abdominal pain, unspecified abdominal location (Primary)    2. Diarrhea, unspecified type    3. Fatigue, unspecified type  - EKG 12-lead: A fib with rapid ventricular response,   - Will send patient to ED for further evaluation of A fib with RVR via EMS    4. Atrial fibrillation with rapid ventricular response    Patient with be reevaluated in  after ED evaluation  or sooner prem Valdez NP

## 2024-10-07 NOTE — Clinical Note
Diagnosis: Atrial fibrillation with RVR [[id number]]   Future Attending Provider: CHAPINCITO HERNANDEZ [[id number]]   Admit to which facility:: [facility] [[id number]]   Reason for IP Medical Treatment  (Clinical interventions that can only be accomplished in the IP setting? ) :: txmt   Plans for Post-Acute care--if anticipated (pick the single best option):: A. No post acute care anticipated at this time

## 2024-10-07 NOTE — ED PROVIDER NOTES
Encounter Date: 10/7/2024       History     Chief Complaint   Patient presents with    Abdominal Pain    Tachycardia     Patient brought in by EMS. Patient was at clinic for abd pain N/V/D x roughly 3 days. Upon nurses assessment at clinic patiets HR elevated and patient in A-fib RVR  On monitor here and with EMS patient shows A-fib RVR     HPI  84-year-old presenting with abdominal pain.  Reports that she was had this abdominal pain for 2 weeks and it has not been worsening but has stayed persistent.  She was never had abdominal pain like this before.  Itis like a band across the center of her abdomen.  Reports that she was had her gallbladder and her appendix removed.  She said that she was at her family doctor's office and they noticed that she was in AFib with RVR and therefore she was sent to the ER.  She reports that she was never had chest pain, shortness of breath, palpitations with her AFib including today.  She said that she was feeling fine today was apprised that she was told that she needed to go to the hospital.  She said that she did have nausea and vomiting and vomited all day yesterday.  She vomited once today.  She reports that the pain does not worsened but she does have severe vomiting that seems to be triggered by eating.  Otherwise she does not know what makes her pain better or worse.  She was not had any fevers, chills, chest pain, shortness of breath, palpitations, lightheadedness, dizzy, dysuria, urinary frequency, diarrhea,.  She says that she has been somewhat constipated recently but did have a bowel movement today.  She was no rash or leg swelling anywhere.  She reports that she was currently being treated for lung cancer.  Status post lobectomy last October.  She reports that she gets chemotherapy every 3 weeks and last had his about 2 weeks ago.  She says that she was never had too many bothersome side effects from her chemo before.  Review of patient's allergies indicates:  No Known  Allergies  Past Medical History:   Diagnosis Date    Arthritis     Cardiac arrest 10/2023    Cataract     Chronic obstructive pulmonary disease, unspecified COPD type 3/20/2024    Colon polyp     Coronary artery disease 3/20/2024    Diabetes mellitus type II 2012    Encounter for blood transfusion     Extra-ovarian endometrioid adenocarcinoma 2020    GERD (gastroesophageal reflux disease)     History of chronic cough     clear productive    Hyperlipidemia     Hypertension     Macular degeneration     Ovarian cancer     PONV (postoperative nausea and vomiting)     Squamous cell carcinoma 1980's    precancer of cervix     Past Surgical History:   Procedure Laterality Date    AUGMENTATION OF BREAST      BRONCHOSCOPY N/A 12/12/2023    Procedure: BRONCHOSCOPY;  Surgeon: Neva Christian MD;  Location: [facility] ENDO;  Service: ENT;  Laterality: N/A;    BRONCHOSCOPY WITH FLUOROSCOPY Right 05/11/2023    Procedure: BRONCHOSCOPY, WITH FLUOROSCOPY;  Surgeon: Neva Christian MD;  Location: [facility] ENDO;  Service: Pulmonary;  Laterality: Right;    BRONCHOSCOPY WITH FLUOROSCOPY N/A 12/15/2023    Procedure: BRONCHOSCOPY, WITH FLUOROSCOPY;  Surgeon: Neva Christian MD;  Location: [facility] ENDO;  Service: Pulmonary;  Laterality: N/A;    CATARACT EXTRACTION BILATERAL W/ ANTERIOR VITRECTOMY Bilateral     CHOLECYSTECTOMY      COLONOSCOPY      COLONOSCOPY N/A 04/20/2023    Procedure: COLONOSCOPY;  Surgeon: Sabino Stephenson MD;  Location: [facility];  Service: Endoscopy;  Laterality: N/A;    CORONARY STENT PLACEMENT  10/2023    x 3    ESOPHAGOGASTRODUODENOSCOPY N/A 06/20/2018    Procedure: EGD (ESOPHAGOGASTRODUODENOSCOPY);  Surgeon: Sabino Stephenson MD;  Location: [facility] ENDO;  Service: Endoscopy;  Laterality: N/A;    ESOPHAGOGASTRODUODENOSCOPY N/A 03/31/2023    Procedure: EGD (ESOPHAGOGASTRODUODENOSCOPY);  Surgeon: Sabino Stephenson MD;  Location: [facility] ENDO;  Service: Endoscopy;  Laterality: N/A;    ESOPHAGOGASTRODUODENOSCOPY N/A 12/11/2023     Procedure: EGD (ESOPHAGOGASTRODUODENOSCOPY);  Surgeon: Pretty Salgado MD;  Location: [facility] ENDO;  Service: Endoscopy;  Laterality: N/A;    HYSTERECTOMY  1976    partial    INJECTION OF ANESTHETIC AGENT AROUND MEDIAL BRANCH NERVES INNERVATING LUMBAR FACET JOINT Right 05/10/2023    Procedure: Block-nerve-Lateral-branch-lumbar;  Surgeon: Agustin Robles MD;  Location: [facility] OR;  Service: Pain Management;  Laterality: Right;  L5 and s1,s2 LBB    INJECTION OF ANESTHETIC AGENT AROUND MEDIAL BRANCH NERVES INNERVATING LUMBAR FACET JOINT Right 05/30/2023    Procedure: Block-nerve-medial branch-lumbar;  Surgeon: Agustin Robles MD;  Location: [facility] OR;  Service: Pain Management;  Laterality: Right;  L5, s1 ,s2 LBB #2    INJECTION OF ANESTHETIC AGENT AROUND NERVE Right 10/31/2023    Procedure: INTERCOSTAL NERVE BLOCK;  Surgeon: Washington Shankar MD;  Location: [facility] OR;  Service: Cardiothoracic;  Laterality: Right;    INJECTION, SACROILIAC JOINT Right 01/26/2023    Procedure: INJECTION,SACROILIAC JOINT;  Surgeon: Agustin Robles MD;  Location: [facility] OR;  Service: Pain Management;  Laterality: Right;    INSERTION OF TUNNELED CENTRAL VENOUS CATHETER (CVC) WITH SUBCUTANEOUS PORT Right 10/19/2020    Procedure: INSERTION, PORT-A-CATH;  Surgeon: Misti Mcfarland MD;  Location: [facility] OR;  Service: General;  Laterality: Right;    INTRAMEDULLARY RODDING OF TROCHANTER OF FEMUR Right 11/28/2021    Procedure: INSERTION, INTRAMEDULLARY LUC, FEMUR, TROCHANTER/RIGHT TFN DR FAJARDO NOTIFIED REP;  Surgeon: Kp Fajardo MD;  Location: [facility] OR;  Service: Orthopedics;  Laterality: Right;  SKIP    ROBOT-ASSISTED LAPAROSCOPIC LYMPHADENECTOMY USING DA NELLA XI N/A 09/21/2020    Procedure: XI ROBOTIC LYMPHADENECTOMY-pelvic and kell-aortic;  Surgeon: Altagracia Rya MD;  Location: [facility] OR;  Service: OB/GYN;  Laterality: N/A;    ROBOT-ASSISTED LAPAROSCOPIC OMENTECTOMY USING DA NELLA XI N/A 09/21/2020    Procedure: XI ROBOTIC OMENTECTOMY;  Surgeon:  Altagracia Ray MD;  Location: [facility] OR;  Service: OB/GYN;  Laterality: N/A;    ROBOT-ASSISTED LAPAROSCOPIC SALPINGO-OOPHORECTOMY USING DA NELLA XI Bilateral 2020    Procedure: XI ROBOTIC SALPINGO-OOPHORECTOMY;  Surgeon: Altagracia Ray MD;  Location: [facility] OR;  Service: OB/GYN;  Laterality: Bilateral;    THORACOSCOPIC BIOPSY OF PLEURA Right 10/31/2023    Procedure: VATS, WITH PLEURA BIOPSY;  Surgeon: Washington Shankar MD;  Location: [facility] OR;  Service: Cardiothoracic;  Laterality: Right;  FROZEN SECTION   **KRISTEN-ASSISDT**    THORACOTOMY Right 10/31/2023    Procedure: THORACOTOMY;  Surgeon: Washington Shankar MD;  Location: [facility];  Service: Cardiothoracic;  Laterality: Right;    UPPER GASTROINTESTINAL ENDOSCOPY       Family History   Problem Relation Name Age of Onset    Cancer Mother  57        lung    Cancer Father  56        oral    Skin cancer Sister      Skin cancer Brother      Melanoma Brother      Skin cancer Brother      Breast cancer Maternal Grandmother      Breast cancer Paternal Grandmother      Colon polyps Daughter      Psoriasis Neg Hx      Lupus Neg Hx      Eczema Neg Hx      Colon cancer Neg Hx      Crohn's disease Neg Hx      Esophageal cancer Neg Hx      Stomach cancer Neg Hx      Ulcerative colitis Neg Hx       Social History     Tobacco Use    Smoking status: Former     Current packs/day: 0.00     Average packs/day: 1 pack/day for 20.0 years (20.0 ttl pk-yrs)     Types: Cigarettes     Start date:      Quit date: 1981     Years since quittin.7    Smokeless tobacco: Never    Tobacco comments:     quit 40 yrs ago   Substance Use Topics    Alcohol use: Yes     Alcohol/week: 1.0 standard drink of alcohol     Types: 1 Glasses of wine per week     Comment: occasional    Drug use: No     Review of Systems   Constitutional:  Negative for chills and fever.   HENT:  Negative for congestion and sore throat.    Eyes:  Negative for redness and visual disturbance.   Respiratory:   Negative for cough and shortness of breath.    Cardiovascular:  Negative for chest pain, palpitations and leg swelling.   Gastrointestinal:  Positive for abdominal pain, nausea and vomiting. Negative for blood in stool, constipation and diarrhea.   Genitourinary:  Negative for dysuria, frequency and hematuria.   Musculoskeletal:  Negative for back pain, joint swelling, neck pain and neck stiffness.   Skin:  Negative for rash and wound.   Neurological:  Negative for weakness and numbness.   Psychiatric/Behavioral:  Negative for confusion.        Physical Exam     Initial Vitals   BP Pulse Resp Temp SpO2   10/07/24 1708 10/07/24 1708 10/07/24 1708 10/07/24 1716 10/07/24 1708   (!) 152/89 (!) 151 (!) 22 99 °F (37.2 °C) 100 %      MAP       --                Physical Exam    Nursing note and vitals reviewed.  Constitutional: She appears well-developed. She is not diaphoretic.   HENT:   Head: Normocephalic.   Eyes: Right eye exhibits no discharge. Left eye exhibits no discharge. No scleral icterus.   Neck: Neck supple. No tracheal deviation present.   Cardiovascular:            AFib with RVR in the 140s and 150s   Pulmonary/Chest: Breath sounds normal. No stridor. No respiratory distress. She has no wheezes. She has no rhonchi. She has no rales.   Abdominal: Abdomen is soft. She exhibits no distension. There is abdominal tenderness (mild, lower quadrants bilta). There is no rebound and no guarding.   Musculoskeletal:         General: No edema.      Cervical back: Neck supple.     Neurological: She is alert and oriented to person, place, and time.   Skin: Skin is warm and dry.         ED Course   Critical Care    Date/Time: 10/7/2024 9:59 PM    Performed by: France Leo MD  Authorized by: France Leo MD  Direct patient critical care time: 20 minutes  Additional history critical care time: 5 minutes  Ordering / reviewing critical care time: 3 minutes  Documentation critical care time: 7 minutes  Consulting other  physicians critical care time: 5 minutes  Total critical care time (exclusive of procedural time) : 40 minutes  Critical care was necessary to treat or prevent imminent or life-threatening deterioration of the following conditions: afib requiring iv meds.        Labs Reviewed   URINALYSIS, REFLEX TO URINE CULTURE - Abnormal       Result Value    Specimen UA Urine, Clean Catch      Color, UA Yellow      Appearance, UA Clear      pH, UA 6.0      Specific Gravity, UA 1.030      Protein, UA 1+ (*)     Glucose, UA Negative      Ketones, UA Negative      Bilirubin (UA) Negative      Occult Blood UA TRACE      Nitrite, UA Negative      Urobilinogen, UA Negative      Leukocytes, UA Negative      Narrative:     Specimen Source->Urine   CBC W/ AUTO DIFFERENTIAL - Abnormal    WBC 3.29 (*)     RBC 3.28 (*)     Hemoglobin 10.1 (*)     Hematocrit 33.8 (*)      (*)     MCH 30.8      MCHC 29.9 (*)     RDW 18.3 (*)     Platelets 171      MPV 9.3      Immature Granulocytes 1.5 (*)     Gran # (ANC) 2.7      Immature Grans (Abs) 0.05 (*)     Lymph # 0.3 (*)     Mono # 0.2 (*)     Eos # 0.0      Baso # 0.01      nRBC 0      Gran % 82.7 (*)     Lymph % 9.1 (*)     Mono % 6.1      Eosinophil % 0.3      Basophil % 0.3      Differential Method Automated     COMPREHENSIVE METABOLIC PANEL - Abnormal    Sodium 137      Potassium 4.1      Chloride 99      CO2 29      Glucose 109      BUN 28 (*)     Creatinine 1.2      Calcium 8.9      Total Protein 6.9      Albumin 3.0 (*)     Total Bilirubin 0.5      Alkaline Phosphatase 49 (*)     AST 13      ALT 13      eGFR 44.9 (*)     Anion Gap 9     B-TYPE NATRIURETIC PEPTIDE - Abnormal     (*)    RESPIRATORY INFECTION PANEL (PCR), NASOPHARYNGEAL    Respiratory Infection Panel Source NP swab      Narrative:     Respiratory Infection Panel source->NP Swab   CULTURE, BLOOD   CULTURE, BLOOD   CBC W/ AUTO DIFFERENTIAL   COMPREHENSIVE METABOLIC PANEL   INFLUENZA A AND B ANTIGEN    Influenza A,  Molecular Negative      Influenza B, Molecular Negative      Flu A & B Source Nasal swab      Narrative:     Specimen Source->Nasopharyngeal Swab   MAGNESIUM   PHOSPHORUS   PROTIME-INR   TROPONIN I HIGH SENSITIVITY   LIPASE   B-TYPE NATRIURETIC PEPTIDE   SARS-COV-2 RNA AMPLIFICATION, QUAL    SARS-CoV-2 RNA, Amplification, Qual Negative     TSH   MAGNESIUM    Magnesium 1.8     PHOSPHORUS    Phosphorus 3.2     TROPONIN I HIGH SENSITIVITY    Troponin I High Sensitivity 13.3     LIPASE    Lipase 40     TSH    TSH 0.876     PROTIME-INR    Prothrombin Time 12.4      INR 1.1     URINALYSIS MICROSCOPIC    RBC, UA 2      WBC, UA 2      Bacteria Occasional      Squam Epithel, UA 4      Hyaline Casts, UA 0      Microscopic Comment SEE COMMENT      Narrative:     Specimen Source->Urine   LACTIC ACID, PLASMA   ISTAT LACTATE    POC Lactate 1.01      Sample VENOUS     ISTAT CREATININE    POC Creatinine 1.1      Sample VENOUS     POCT LACTATE   POCT CREATININE          Imaging Results              X-Ray Chest AP Portable (Final result)  Result time 10/07/24 19:05:03      Final result by Virgil Gongora MD (10/07/24 19:05:03)                   Impression:      Abnormal chest radiograph as above.      Electronically signed by: Virgil Gongora MD  Date:    10/07/2024  Time:    19:05               Narrative:    EXAMINATION:  XR CHEST AP PORTABLE    CLINICAL HISTORY:  Sepsis;    TECHNIQUE:  Single frontal view of the chest was performed.    COMPARISON:  06/19/2024    FINDINGS:  There is a right-sided MediPort catheter in similar position.  Cardiac monitoring leads overlie the chest.  There is stable mild rightward deviation of the trachea.  Cardiac silhouette is stable in size and configuration.  There is aortic atherosclerosis.  There is a large loculated right-sided pleural effusion.  There are diffuse patchy airspace opacities throughout the aerated right lung as well as the left lung which may reflect edema, aspiration, or  multifocal infection.  No large volume of left-sided pleural fluid appreciated radiographically.  There are bilateral calcified breast prosthesis present.  No definite pneumothorax identified.  Osseous structures demonstrate degenerative changes.                                        CTA Chest Abdomen Pelvis (Final result)  Result time 10/07/24 19:09:26      Final result by Joycelyn Taveras MD (10/07/24 19:09:26)                   Impression:      Postsurgical changes of right lower lobectomy.  Loculated right pleural effusion similar to prior.  New left pleural effusion and worsening diffuse bilateral airspace opacities interlobular septal thickening, concerning for progression of malignancy and or superimposed pneumonia.    New mesenteric stranding worse along the ascending colon.  Findings could be due to volume overload or infectious/inflammatory colitis.    Severe aortic atheromatous plaque.  No aortic aneurysm or dissection.  The renal and mesenteric arteries are patent.    This report was flagged in Epic as abnormal.      Electronically signed by: Joycelyn Taveras  Date:    10/07/2024  Time:    19:09               Narrative:    EXAMINATION:  CTA CHEST ABDOMEN PELVIS    CLINICAL HISTORY:  mesenteric ischemia eval vs aortic eval;    TECHNIQUE:  Low dose axial images, sagittal and coronal reformations were obtained from the thoracic inlet through the pelvis following the IV administration of 100 mL of Omnipaque 350 .  No oral contrast was administered.    COMPARISON:  Chest radiograph 06/19/2024, CT chest abdomen pelvis 12/10/2023    FINDINGS:  Cardiomegaly.  No pericardial effusion.  No thoracic aortic aneurysm.  No acute pulmonary embolus.    No enlarged axillary or mediastinal nodes.  No enlarged hilar lymph nodes.    Loculated right pleural effusion similar to prior.  New small left pleural effusion.  Basilar predominant consolidation.  Central airways are clear.  Postsurgical changes of right lower lobectomy.   Diffuse ground-glass opacities, interlobular septal thickening and bronchial wall thickening increased compared to prior exam and now involving the left lung.  Bronchocentric consolidation in the right upper lobe.  Pulmonary emphysema.    Liver is normal in morphology and with smooth contour.  Stable right hepatic lobe cyst.    The spleen, pancreas, adrenal glands and kidneys are normal.  No intra or extrahepatic biliary ductal dilatation.  The gallbladder is surgically absent.  Right renal cysts better evaluated on prior CT.    Abdominal aorta with severe atherosclerotic calcification..  No aortic aneurysm or dissection.  The renal arteries and mesenteric arteries are patent.  No enlarged retroperitoneal lymph nodes.    Colonic diverticula.  New mesenteric stranding, worse about the ascending colon.  No bowel dilatation.  No organized fluid collection or free air.    Bladder is smooth walled.  Uterus is absent.  No enlarged pelvic lymph nodes.    Postsurgical changes of right femur ORIF with hardware intact.  Dextroscoliosis of the lumbar spine.  Bilateral breast implants in place.                                       Medications   vancomycin - pharmacy to dose (has no administration in time range)   vancomycin in dextrose 5 % 1 gram/250 mL IVPB 1,000 mg (has no administration in time range)   apixaban tablet 5 mg (5 mg Oral Given 10/7/24 2153)   aspirin EC tablet 81 mg (has no administration in time range)   lisinopriL tablet 40 mg (has no administration in time range)   clopidogreL tablet 75 mg (has no administration in time range)   folic acid tablet 1 mg (has no administration in time range)   gabapentin capsule 100 mg (100 mg Oral Given 10/7/24 2153)   HYDROcodone-acetaminophen 5-325 mg per tablet 1 tablet (has no administration in time range)   metoprolol succinate (TOPROL-XL) 24 hr tablet 25 mg (has no administration in time range)   magnesium oxide tablet 400 mg (has no administration in time range)    sodium chloride 0.9% flush 2 mL (has no administration in time range)   melatonin tablet 6 mg (has no administration in time range)   ondansetron injection 4 mg (has no administration in time range)   acetaminophen tablet 650 mg (has no administration in time range)   aluminum-magnesium hydroxide-simethicone 200-200-20 mg/5 mL suspension 30 mL (has no administration in time range)   acetaminophen tablet 650 mg (has no administration in time range)   naloxone 0.4 mg/mL injection 0.02 mg (has no administration in time range)   potassium bicarbonate disintegrating tablet 50 mEq (has no administration in time range)   potassium bicarbonate disintegrating tablet 35 mEq (has no administration in time range)   potassium bicarbonate disintegrating tablet 60 mEq (has no administration in time range)   magnesium oxide tablet 800 mg (has no administration in time range)   magnesium oxide tablet 800 mg (has no administration in time range)   potassium, sodium phosphates 280-160-250 mg packet 2 packet (has no administration in time range)   potassium, sodium phosphates 280-160-250 mg packet 2 packet (has no administration in time range)   potassium, sodium phosphates 280-160-250 mg packet 2 packet (has no administration in time range)   glucose chewable tablet 16 g (has no administration in time range)   glucose chewable tablet 24 g (has no administration in time range)   dextrose 50% injection 12.5 g (has no administration in time range)   dextrose 50% injection 25 g (has no administration in time range)   glucagon (human recombinant) injection 1 mg (has no administration in time range)   albuterol-ipratropium 2.5 mg-0.5 mg/3 mL nebulizer solution 3 mL (has no administration in time range)   mupirocin 2 % ointment (1 g Nasal Given 10/7/24 2153)   diltiaZEM 100 mg in dextrose 5% 100 mL IVPB (ready to mix) (titrating) (2.5 mg/hr Intravenous New Bag 10/7/24 2152)   cefepime 1g in dextrose 5% 100 mL IVPB (ready to mix) (has no  administration in time range)   morphine injection 1 mg (has no administration in time range)   metoprolol injection 5 mg (5 mg Intravenous Given 10/7/24 1734)   metoprolol injection 5 mg (5 mg Intravenous Given 10/7/24 2058)   iohexoL (OMNIPAQUE 350) injection 100 mL (100 mLs Intravenous Given 10/7/24 1835)   cefepime 2 g in dextrose 5% 100 mL IVPB (ready to mix) (0 g Intravenous Stopped 10/7/24 2133)   diltiaZEM injection 13.5 mg (13.5 mg Intravenous Given 10/7/24 2148)     Medical Decision Making  On my independent interpretation EKG with rate of, normal intervals, no sign of acute occlusion myocardial infarction      On my independent interpretation labs CBC, CMP, INR, BNP, magnesium, lipase, TSH, phos, troponin, flu, COVID, lactic, urinalysis within acceptable limits or near patient's baseline except for , BUN 28, WBC 3.29, previously 5-11    Per radiology CT abdomen and pelvis and chest x-ray within acceptable limits except for Postsurgical changes of right lower lobectomy.  Loculated right pleural effusion similar to prior.  New left pleural effusion and worsening diffuse bilateral airspace opacities interlobular septal thickening, concerning for progression of malignancy and or superimposed pneumonia.     New mesenteric stranding worse along the ascending colon.  Findings could be due to volume overload or infectious/inflammatory colitis.     Severe aortic atheromatous plaque.  No aortic aneurysm or dissection.  The renal and mesenteric arteries are patent.    Due to temp 99, tachycardia, afib with rvr, decreased in wbc <4 sepsis workup completed. Patient's abdominal pain most likely from possible colitis versus fluid overload secondary to her AFib which is likely triggered by her superimposed pneumonia on lung lying Yun as well as new left pleural effusion.  Initially due to patient reporting no chest pain, shortness breath, fevers, cough and it only abdominal pain patient was empirically covered  with Zosyn but once found to have superimposed pneumonia and or worsening malignancy Covered with cefepime, vanc as well.  Patient taking metoprolol at home therefore attempted heart rate control with metoprolol IV.  After 1st dose of metoprolol blood pressure stable and heart rate in the 120s but soon after increasing therefore given another 5 mg with decreased to 120s and then pt again in upper 130's. I asked nurse to give pt 5mg metoprolol but there was concern about her bp. We trended her bp and she remained in the 120's-130's systolic so I asked nurse to give metoprolol and consulted hospital medicine with plan to order pt's home dose metoprolol as well. Hospitalist NP Kelsy reported 3rd dose metoprolol had not been given and she again asked for it to be given now and would rather start a dilt drip now. Pt admitted after I updated pt . Pt reports she is dnr If her heart stops but if needs intubation prior for any other reason not sure if she wants this or not    France Leo MD  Emergency Medicine Staff Physician                                      Clinical Impression:  Final diagnoses:  [I48.91] Atrial fibrillation, unspecified type (Primary)  [J18.9] Pneumonia of both lungs due to infectious organism, unspecified part of lung  [R10.9] Abdominal pain, unspecified abdominal location          ED Disposition Condition    Admit Stable                France Leo MD  10/07/24 1682

## 2024-10-08 ENCOUNTER — TELEPHONE (OUTPATIENT)
Dept: PULMONOLOGY | Facility: CLINIC | Age: 84
End: 2024-10-08
Payer: MEDICARE

## 2024-10-08 ENCOUNTER — CLINICAL SUPPORT (OUTPATIENT)
Dept: CARDIOLOGY | Facility: HOSPITAL | Age: 84
End: 2024-10-08
Attending: HOSPITALIST
Payer: MEDICARE

## 2024-10-08 LAB
ALBUMIN SERPL BCP-MCNC: 2.5 G/DL (ref 3.5–5.2)
ALP SERPL-CCNC: 38 U/L (ref 55–135)
ALT SERPL W/O P-5'-P-CCNC: 8 U/L (ref 10–44)
ANION GAP SERPL CALC-SCNC: 6 MMOL/L (ref 8–16)
AORTIC ROOT ANNULUS: 2.3 CM
AORTIC VALVE CUSP SEPERATION: 1 CM
APICAL FOUR CHAMBER EJECTION FRACTION: 42 %
APICAL TWO CHAMBER EJECTION FRACTION: 47 %
AST SERPL-CCNC: 11 U/L (ref 10–40)
AV INDEX (PROSTH): 0.62
AV MEAN GRADIENT: 5 MMHG
AV PEAK GRADIENT: 9 MMHG
AV VALVE AREA BY VELOCITY RATIO: 1.2 CM²
AV VALVE AREA: 1.2 CM²
AV VELOCITY RATIO: 0.6
BASOPHILS # BLD AUTO: 0.01 K/UL (ref 0–0.2)
BASOPHILS NFR BLD: 0.4 % (ref 0–1.9)
BILIRUB SERPL-MCNC: 0.5 MG/DL (ref 0.1–1)
BSA FOR ECHO PROCEDURE: 1.55 M2
BUN SERPL-MCNC: 22 MG/DL (ref 8–23)
CALCIUM SERPL-MCNC: 7.9 MG/DL (ref 8.7–10.5)
CHLORIDE SERPL-SCNC: 102 MMOL/L (ref 95–110)
CO2 SERPL-SCNC: 29 MMOL/L (ref 23–29)
CREAT SERPL-MCNC: 1 MG/DL (ref 0.5–1.4)
CV ECHO LV RWT: 0.53 CM
DIFFERENTIAL METHOD BLD: ABNORMAL
DOP CALC AO PEAK VEL: 1.5 M/S
DOP CALC AO VTI: 21.1 CM
DOP CALC LVOT AREA: 2 CM2
DOP CALC LVOT DIAMETER: 1.6 CM
DOP CALC LVOT PEAK VEL: 0.9 M/S
DOP CALC LVOT STROKE VOLUME: 26.1 CM3
DOP CALC MV VTI: 36.8 CM
DOP CALCLVOT PEAK VEL VTI: 13 CM
E WAVE DECELERATION TIME: 254 MSEC
E/E' RATIO: 15.05 M/S
ECHO LV POSTERIOR WALL: 0.9 CM (ref 0.6–1.1)
EOSINOPHIL # BLD AUTO: 0 K/UL (ref 0–0.5)
EOSINOPHIL NFR BLD: 0.4 % (ref 0–8)
ERYTHROCYTE [DISTWIDTH] IN BLOOD BY AUTOMATED COUNT: 18 % (ref 11.5–14.5)
EST. GFR  (NO RACE VARIABLE): 55.9 ML/MIN/1.73 M^2
FRACTIONAL SHORTENING: 14.7 % (ref 28–44)
GLUCOSE SERPL-MCNC: 110 MG/DL (ref 70–110)
HCT VFR BLD AUTO: 26.1 % (ref 37–48.5)
HGB BLD-MCNC: 8 G/DL (ref 12–16)
IMM GRANULOCYTES # BLD AUTO: 0.03 K/UL (ref 0–0.04)
IMM GRANULOCYTES NFR BLD AUTO: 1.2 % (ref 0–0.5)
INTERVENTRICULAR SEPTUM: 1.3 CM (ref 0.6–1.1)
IVC DIAMETER: 1.79 CM
LEFT INTERNAL DIMENSION IN SYSTOLE: 2.9 CM (ref 2.1–4)
LEFT VENTRICLE DIASTOLIC VOLUME INDEX: 30.23 ML/M2
LEFT VENTRICLE DIASTOLIC VOLUME: 47.77 ML
LEFT VENTRICLE END DIASTOLIC VOLUME APICAL 2 CHAMBER: 62.5 ML
LEFT VENTRICLE END DIASTOLIC VOLUME APICAL 4 CHAMBER: 32.5 ML
LEFT VENTRICLE MASS INDEX: 72.2 G/M2
LEFT VENTRICLE SYSTOLIC VOLUME INDEX: 19.5 ML/M2
LEFT VENTRICLE SYSTOLIC VOLUME: 30.87 ML
LEFT VENTRICULAR INTERNAL DIMENSION IN DIASTOLE: 3.4 CM (ref 3.5–6)
LEFT VENTRICULAR MASS: 114 G
LV LATERAL E/E' RATIO: 13.17 M/S
LV SEPTAL E/E' RATIO: 17.56 M/S
LVED V (TEICH): 47.77 ML
LVES V (TEICH): 30.87 ML
LVOT MG: 2 MMHG
LVOT MV: 0.56 CM/S
LYMPHOCYTES # BLD AUTO: 0.5 K/UL (ref 1–4.8)
LYMPHOCYTES NFR BLD: 20.3 % (ref 18–48)
MCH RBC QN AUTO: 31.5 PG (ref 27–31)
MCHC RBC AUTO-ENTMCNC: 30.7 G/DL (ref 32–36)
MCV RBC AUTO: 103 FL (ref 82–98)
MONOCYTES # BLD AUTO: 0.3 K/UL (ref 0.3–1)
MONOCYTES NFR BLD: 10.8 % (ref 4–15)
MV MEAN GRADIENT: 8 MMHG
MV PEAK E VEL: 1.58 M/S
MV PEAK GRADIENT: 15 MMHG
MV VALVE AREA BY CONTINUITY EQUATION: 0.71 CM2
NEUTROPHILS # BLD AUTO: 1.6 K/UL (ref 1.8–7.7)
NEUTROPHILS NFR BLD: 66.9 % (ref 38–73)
NRBC BLD-RTO: 0 /100 WBC
OHS LV EJECTION FRACTION SIMPSONS BIPLANE MOD: 40 %
OHS QRS DURATION: 64 MS
OHS QTC CALCULATION: 431 MS
PISA MRMAX VEL: 4.42 M/S
PISA TR MAX VEL: 2.53 M/S
PLATELET # BLD AUTO: 121 K/UL (ref 150–450)
PMV BLD AUTO: 9.4 FL (ref 9.2–12.9)
POTASSIUM SERPL-SCNC: 3.8 MMOL/L (ref 3.5–5.1)
PROT SERPL-MCNC: 5.5 G/DL (ref 6–8.4)
PV MV: 0.72 M/S
PV PEAK GRADIENT: 4 MMHG
PV PEAK VELOCITY: 0.96 M/S
RA PRESSURE ESTIMATED: 3 MMHG
RBC # BLD AUTO: 2.54 M/UL (ref 4–5.4)
RV TB RVSP: 6 MMHG
RV TISSUE DOPPLER FREE WALL SYSTOLIC VELOCITY 1 (APICAL 4 CHAMBER VIEW): 8.49 CM/S
SODIUM SERPL-SCNC: 137 MMOL/L (ref 136–145)
TDI LATERAL: 0.12 M/S
TDI SEPTAL: 0.09 M/S
TDI: 0.11 M/S
TR MAX PG: 26 MMHG
TRICUSPID ANNULAR PLANE SYSTOLIC EXCURSION: 1.4 CM
TV REST PULMONARY ARTERY PRESSURE: 29 MMHG
VANCOMYCIN SERPL-MCNC: 6.6 UG/ML
WBC # BLD AUTO: 2.41 K/UL (ref 3.9–12.7)
Z-SCORE OF LEFT VENTRICULAR DIMENSION IN END DIASTOLE: -2.86
Z-SCORE OF LEFT VENTRICULAR DIMENSION IN END SYSTOLE: 0.26

## 2024-10-08 PROCEDURE — 96375 TX/PRO/DX INJ NEW DRUG ADDON: CPT

## 2024-10-08 PROCEDURE — 85025 COMPLETE CBC W/AUTO DIFF WBC: CPT | Performed by: NURSE PRACTITIONER

## 2024-10-08 PROCEDURE — 99900031 HC PATIENT EDUCATION (STAT)

## 2024-10-08 PROCEDURE — 96366 THER/PROPH/DIAG IV INF ADDON: CPT

## 2024-10-08 PROCEDURE — 99900035 HC TECH TIME PER 15 MIN (STAT)

## 2024-10-08 PROCEDURE — 25000003 PHARM REV CODE 250: Performed by: NURSE PRACTITIONER

## 2024-10-08 PROCEDURE — 25000003 PHARM REV CODE 250: Performed by: HOSPITALIST

## 2024-10-08 PROCEDURE — 80053 COMPREHEN METABOLIC PANEL: CPT | Performed by: NURSE PRACTITIONER

## 2024-10-08 PROCEDURE — 99223 1ST HOSP IP/OBS HIGH 75: CPT | Mod: ,,, | Performed by: INTERNAL MEDICINE

## 2024-10-08 PROCEDURE — 27000221 HC OXYGEN, UP TO 24 HOURS

## 2024-10-08 PROCEDURE — 93306 TTE W/DOPPLER COMPLETE: CPT | Mod: 26,,, | Performed by: INTERNAL MEDICINE

## 2024-10-08 PROCEDURE — 93306 TTE W/DOPPLER COMPLETE: CPT

## 2024-10-08 PROCEDURE — 94799 UNLISTED PULMONARY SVC/PX: CPT

## 2024-10-08 PROCEDURE — 94761 N-INVAS EAR/PLS OXIMETRY MLT: CPT

## 2024-10-08 PROCEDURE — 63600175 PHARM REV CODE 636 W HCPCS: Performed by: NURSE PRACTITIONER

## 2024-10-08 PROCEDURE — 63600175 PHARM REV CODE 636 W HCPCS: Performed by: INTERNAL MEDICINE

## 2024-10-08 PROCEDURE — 99406 BEHAV CHNG SMOKING 3-10 MIN: CPT

## 2024-10-08 PROCEDURE — 80202 ASSAY OF VANCOMYCIN: CPT | Performed by: HOSPITALIST

## 2024-10-08 PROCEDURE — 36415 COLL VENOUS BLD VENIPUNCTURE: CPT | Performed by: HOSPITALIST

## 2024-10-08 PROCEDURE — 21000000 HC CCU ICU ROOM CHARGE

## 2024-10-08 RX ORDER — FUROSEMIDE 10 MG/ML
40 INJECTION INTRAMUSCULAR; INTRAVENOUS ONCE
Status: COMPLETED | OUTPATIENT
Start: 2024-10-08 | End: 2024-10-08

## 2024-10-08 RX ADMIN — MUPIROCIN 1 G: 20 OINTMENT TOPICAL at 09:10

## 2024-10-08 RX ADMIN — CLOPIDOGREL BISULFATE 75 MG: 75 TABLET, FILM COATED ORAL at 09:10

## 2024-10-08 RX ADMIN — FOLIC ACID 1 MG: 1 TABLET ORAL at 09:10

## 2024-10-08 RX ADMIN — Medication 400 MG: at 09:10

## 2024-10-08 RX ADMIN — CEFEPIME 1 G: 1 INJECTION, POWDER, FOR SOLUTION INTRAMUSCULAR; INTRAVENOUS at 09:10

## 2024-10-08 RX ADMIN — APIXABAN 5 MG: 5 TABLET, FILM COATED ORAL at 09:10

## 2024-10-08 RX ADMIN — FUROSEMIDE 40 MG: 10 INJECTION, SOLUTION INTRAMUSCULAR; INTRAVENOUS at 12:10

## 2024-10-08 RX ADMIN — GABAPENTIN 100 MG: 100 CAPSULE ORAL at 08:10

## 2024-10-08 RX ADMIN — ASPIRIN 81 MG: 81 TABLET, COATED ORAL at 09:10

## 2024-10-08 RX ADMIN — DEXTROSE MONOHYDRATE 7.5 MG/HR: 50 INJECTION, SOLUTION INTRAVENOUS at 12:10

## 2024-10-08 RX ADMIN — MUPIROCIN 1 G: 20 OINTMENT TOPICAL at 08:10

## 2024-10-08 RX ADMIN — ACETAMINOPHEN 650 MG: 325 TABLET ORAL at 09:10

## 2024-10-08 RX ADMIN — DEXTROSE MONOHYDRATE 12.5 MG/HR: 50 INJECTION, SOLUTION INTRAVENOUS at 09:10

## 2024-10-08 RX ADMIN — APIXABAN 5 MG: 5 TABLET, FILM COATED ORAL at 08:10

## 2024-10-08 RX ADMIN — GABAPENTIN 100 MG: 100 CAPSULE ORAL at 09:10

## 2024-10-08 NOTE — ASSESSMENT & PLAN NOTE
Patient has paroxysmal (<7 days) atrial fibrillation. Patient is currently in atrial fibrillation. [id number] Score: 5. The patients heart rate in the last 24 hours is as follows:  Pulse  Min: 69  Max: 152     Antiarrhythmics  metoprolol succinate (TOPROL-XL) 24 hr tablet 25 mg, Daily, Oral  diltiaZEM injection 13.5 mg, Once, Intravenous  diltiaZEM 100 mg in dextrose 5% 100 mL IVPB (ready to mix) (titrating), Continuous, Intravenous    Anticoagulants  apixaban tablet 5 mg, 2 times daily, Oral    Plan  - Replete lytes with a goal of K>4, Mg >2  - Patient is anticoagulated, see medications listed above.  - Patient's afib is currently uncontrolled. Will adjust treatment as follows:  Start diltiazem weight based bolus with titratable drip .  We will keep Lopressor 5 mg IV push for breakthrough wants patient is at max of 15 milligrams/hour  hold patient's amiodarone continue patient's Eliquis restart p.o. metoprolol in a.m.

## 2024-10-08 NOTE — TELEPHONE ENCOUNTER
----- Message from Amira sent at 10/8/2024  9:44 AM CDT -----  Regarding: Patient update  Patient called to tell doctor that as of yesterday she has been admitted in to the hospital with Afip

## 2024-10-08 NOTE — PROGRESS NOTES
VANCOMYCIN PHARMACOKINETIC NOTE:  Vancomycin Day # 1    Objective/Assessment:    Diagnosis/Indication for Vancomycin:Pneumonia      84 y.o., female; Actual Body Weight = 54.9 kg (121 lb 0.5 oz).    The patient has the following labs:  10/8/2024 Estimated Creatinine Clearance: 30.7 mL/min (based on SCr of 1.2 mg/dL). Lab Results   Component Value Date    BUN 28 (H) 10/07/2024     Lab Results   Component Value Date    WBC 3.29 (L) 10/07/2024          Plan:  Adjust vancomycin dose and/or frequency based on the patient's actual weight and renal function:  Initiate Vancomycin 1000 mg IV once with subsequent dose following random level. Orders have been entered into patient's chart.        Vancomycin random level has been ordered for 2100 on 10/08.    Pharmacy will manage vancomycin therapy, monitor serum vancomycin levels, monitor renal function and adjust regimen as necessary.    Thank you for allowing us to participate in this patient's care.     Marcy Breaux 10/8/2024   Department of Pharmacy  Ext [phone]

## 2024-10-08 NOTE — ASSESSMENT & PLAN NOTE
Patient has paroxysmal (<7 days) atrial fibrillation. Patient is currently in atrial fibrillation. [id number] Score: 5. The patients heart rate in the last 24 hours is as follows:  Pulse  Min: 69  Max: 152     Antiarrhythmics  diltiaZEM 100 mg in dextrose 5% 100 mL IVPB (ready to mix) (titrating), Continuous, Intravenous    Anticoagulants  apixaban tablet 5 mg, 2 times daily, Oral    Plan  - Replete lytes with a goal of K>4, Mg >2  - Patient is anticoagulated, see medications listed above.  - Patient's afib is currently uncontrolled. Will adjust treatment as follows:  Start diltiazem weight based bolus with titratable drip .  We will keep Lopressor 5 mg IV push for breakthrough wants patient is at max of 15 milligrams/hour  hold patient's amiodarone continue patient's Eliquis, continue metoprolol

## 2024-10-08 NOTE — ASSESSMENT & PLAN NOTE
Supplemental oxygen   Antibiotic treatments  May improve with conversion of atrial fibrillation holding off on IV diuretic at this time

## 2024-10-08 NOTE — PROGRESS NOTES
Pharmacist Renal Dose Adjustment Note    Alexa Otero is a 84 y.o. female being treated with the medication Cefepime.    Patient Data:    Vital Signs (Most Recent):  Temp: 99 °F (37.2 °C) (10/07/24 1716)  Pulse: (!) 122 (10/07/24 1926)  Resp: 20 (10/07/24 1916)  BP: 131/69 ( & Ruebn NP wanted another dose to be given) (10/07/24 2058)  SpO2: 100 % (10/07/24 1926) Vital Signs (72h Range):  Temp:  [97.9 °F (36.6 °C)-99 °F (37.2 °C)]   Pulse:  []   Resp:  [20-26]   BP: (124-152)/(69-93)   SpO2:  [91 %-100 %]      Recent Labs   Lab 10/07/24  1713   CREATININE 1.2     Serum creatinine: 1.2 mg/dL 10/07/24 1713  Estimated creatinine clearance: 30.7 mL/min    Cefepime 1 gram IV every 24 hours will be changed to Cefepime 1 gram IV every 12 hours due to CrCl- 10-50 ml/min.    Pharmacist's Name: Nisreen Floyd  Pharmacist's Extension: [phone]

## 2024-10-08 NOTE — SUBJECTIVE & OBJECTIVE
Past Medical History:   Diagnosis Date    Arthritis     Cardiac arrest 10/2023    Cataract     Chronic obstructive pulmonary disease, unspecified COPD type 3/20/2024    Colon polyp     Coronary artery disease 3/20/2024    Diabetes mellitus type II 2012    Encounter for blood transfusion     Extra-ovarian endometrioid adenocarcinoma 2020    GERD (gastroesophageal reflux disease)     History of chronic cough     clear productive    Hyperlipidemia     Hypertension     Macular degeneration     Ovarian cancer     PONV (postoperative nausea and vomiting)     Squamous cell carcinoma 1980's    precancer of cervix       Past Surgical History:   Procedure Laterality Date    AUGMENTATION OF BREAST      BRONCHOSCOPY N/A 12/12/2023    Procedure: BRONCHOSCOPY;  Surgeon: Neva Christian MD;  Location: [facility] ENDO;  Service: ENT;  Laterality: N/A;    BRONCHOSCOPY WITH FLUOROSCOPY Right 05/11/2023    Procedure: BRONCHOSCOPY, WITH FLUOROSCOPY;  Surgeon: Neva Christian MD;  Location: [facility] ENDO;  Service: Pulmonary;  Laterality: Right;    BRONCHOSCOPY WITH FLUOROSCOPY N/A 12/15/2023    Procedure: BRONCHOSCOPY, WITH FLUOROSCOPY;  Surgeon: Neva Christian MD;  Location: [facility] ENDO;  Service: Pulmonary;  Laterality: N/A;    CATARACT EXTRACTION BILATERAL W/ ANTERIOR VITRECTOMY Bilateral     CHOLECYSTECTOMY      COLONOSCOPY      COLONOSCOPY N/A 04/20/2023    Procedure: COLONOSCOPY;  Surgeon: Sabino Stephenson MD;  Location: [facility];  Service: Endoscopy;  Laterality: N/A;    CORONARY STENT PLACEMENT  10/2023    x 3    ESOPHAGOGASTRODUODENOSCOPY N/A 06/20/2018    Procedure: EGD (ESOPHAGOGASTRODUODENOSCOPY);  Surgeon: Sabino Stephenson MD;  Location: [facility] ENDO;  Service: Endoscopy;  Laterality: N/A;    ESOPHAGOGASTRODUODENOSCOPY N/A 03/31/2023    Procedure: EGD (ESOPHAGOGASTRODUODENOSCOPY);  Surgeon: Sabino Stephenson MD;  Location: [facility] ENDO;  Service: Endoscopy;  Laterality: N/A;    ESOPHAGOGASTRODUODENOSCOPY N/A 12/11/2023     Procedure: EGD (ESOPHAGOGASTRODUODENOSCOPY);  Surgeon: Pretty Salgado MD;  Location: [facility] ENDO;  Service: Endoscopy;  Laterality: N/A;    HYSTERECTOMY  1976    partial    INJECTION OF ANESTHETIC AGENT AROUND MEDIAL BRANCH NERVES INNERVATING LUMBAR FACET JOINT Right 05/10/2023    Procedure: Block-nerve-Lateral-branch-lumbar;  Surgeon: Agustin Robles MD;  Location: [facility] OR;  Service: Pain Management;  Laterality: Right;  L5 and s1,s2 LBB    INJECTION OF ANESTHETIC AGENT AROUND MEDIAL BRANCH NERVES INNERVATING LUMBAR FACET JOINT Right 05/30/2023    Procedure: Block-nerve-medial branch-lumbar;  Surgeon: Agustin Robles MD;  Location: [facility] OR;  Service: Pain Management;  Laterality: Right;  L5, s1 ,s2 LBB #2    INJECTION OF ANESTHETIC AGENT AROUND NERVE Right 10/31/2023    Procedure: INTERCOSTAL NERVE BLOCK;  Surgeon: Washington Shankar MD;  Location: [facility] OR;  Service: Cardiothoracic;  Laterality: Right;    INJECTION, SACROILIAC JOINT Right 01/26/2023    Procedure: INJECTION,SACROILIAC JOINT;  Surgeon: Agustin Robles MD;  Location: [facility] OR;  Service: Pain Management;  Laterality: Right;    INSERTION OF TUNNELED CENTRAL VENOUS CATHETER (CVC) WITH SUBCUTANEOUS PORT Right 10/19/2020    Procedure: INSERTION, PORT-A-CATH;  Surgeon: Misti Mcfarland MD;  Location: [facility] OR;  Service: General;  Laterality: Right;    INTRAMEDULLARY RODDING OF TROCHANTER OF FEMUR Right 11/28/2021    Procedure: INSERTION, INTRAMEDULLARY LUC, FEMUR, TROCHANTER/RIGHT TFN DR FAJARDO NOTIFIED REP;  Surgeon: Kp Fajardo MD;  Location: [facility] OR;  Service: Orthopedics;  Laterality: Right;  SKIP    ROBOT-ASSISTED LAPAROSCOPIC LYMPHADENECTOMY USING DA NELLA XI N/A 09/21/2020    Procedure: XI ROBOTIC LYMPHADENECTOMY-pelvic and kell-aortic;  Surgeon: Altagracia Ray MD;  Location: [facility] OR;  Service: OB/GYN;  Laterality: N/A;    ROBOT-ASSISTED LAPAROSCOPIC OMENTECTOMY USING DA NELLA XI N/A 09/21/2020    Procedure: XI ROBOTIC OMENTECTOMY;  Surgeon:  Altagracia Ray MD;  Location: [facility] OR;  Service: OB/GYN;  Laterality: N/A;    ROBOT-ASSISTED LAPAROSCOPIC SALPINGO-OOPHORECTOMY USING DA NELLA XI Bilateral 09/21/2020    Procedure: XI ROBOTIC SALPINGO-OOPHORECTOMY;  Surgeon: Altagracia Ray MD;  Location: [facility] OR;  Service: OB/GYN;  Laterality: Bilateral;    THORACOSCOPIC BIOPSY OF PLEURA Right 10/31/2023    Procedure: VATS, WITH PLEURA BIOPSY;  Surgeon: Washington Shankar MD;  Location: [facility] OR;  Service: Cardiothoracic;  Laterality: Right;  FROZEN SECTION   **KRISTEN-ASSISDT**    THORACOTOMY Right 10/31/2023    Procedure: THORACOTOMY;  Surgeon: Washington Shankar MD;  Location: [facility] OR;  Service: Cardiothoracic;  Laterality: Right;    UPPER GASTROINTESTINAL ENDOSCOPY         Review of patient's allergies indicates:  No Known Allergies    Current Facility-Administered Medications on File Prior to Encounter   Medication    diphenhydrAMINE injection 25 mg    diphenhydrAMINE injection 25 mg     Current Outpatient Medications on File Prior to Encounter   Medication Sig    amiodarone (PACERONE) 200 MG Tab Take 1 tablet (200 mg total) by mouth once daily.    apixaban (ELIQUIS) 5 mg Tab Take 1 tablet (5 mg total) by mouth 2 (two) times daily. 2 tablets twice a day for a week, then decrease to 1 tablet twice a day    aspirin (ECOTRIN) 81 MG EC tablet Take 81 mg by mouth once daily.    benazepriL (LOTENSIN) 40 MG tablet Take 0.5 tablets (20 mg total) by mouth once daily.    clopidogreL (PLAVIX) 75 mg tablet Take 1 tablet (75 mg total) by mouth once daily.    folic acid (FOLVITE) 1 MG tablet Take 1 tablet (1 mg total) by mouth once daily.    gabapentin (NEURONTIN) 100 MG capsule Take 1 capsule (100 mg total) by mouth 2 (two) times daily.    HYDROcodone-acetaminophen (NORCO) 5-325 mg per tablet Take 1 tablet by mouth every 6 (six) hours as needed for Pain.    LORazepam (ATIVAN) 1 MG tablet Take 1 tablet (1 mg total) by mouth every 6 (six) hours as needed for Anxiety  (insomnia).    magnesium oxide (MAG-OX) 400 mg (241.3 mg magnesium) tablet Take 1 tablet (400 mg total) by mouth once daily. Patient requested refill    metoprolol succinate (TOPROL-XL) 25 MG 24 hr tablet Take 1 tablet (25 mg total) by mouth once daily.    potassium chloride (K-TAB) 20 mEq Take 1 tablet by mouth once daily.    promethazine (PHENERGAN) 25 MG tablet Take 1 tablet (25 mg total) by mouth every 4 (four) hours as needed for Nausea.    VIT A/VIT C/VIT E/ZINC/COPPER (ICAPS AREDS ORAL) Take 1 capsule by mouth 2 (two) times a day.     [DISCONTINUED] melatonin 5 mg Chew Take 1 tablet by mouth nightly as needed (insomnia).    [DISCONTINUED] ondansetron (ZOFRAN) 8 MG tablet Take 1 tablet (8 mg total) by mouth every 8 (eight) hours as needed.    [DISCONTINUED] potassium chloride SA (K-DUR,KLOR-CON) 20 MEQ tablet Take 1 tablet daily     Family History       Problem Relation (Age of Onset)    Breast cancer Maternal Grandmother, Paternal Grandmother    Cancer Mother (57), Father (56)    Colon polyps Daughter    Melanoma Brother    Skin cancer Sister, Brother, Brother          Tobacco Use    Smoking status: Former     Current packs/day: 0.00     Average packs/day: 1 pack/day for 20.0 years (20.0 ttl pk-yrs)     Types: Cigarettes     Start date:      Quit date: 1981     Years since quittin.7    Smokeless tobacco: Never    Tobacco comments:     quit 40 yrs ago   Substance and Sexual Activity    Alcohol use: Yes     Alcohol/week: 1.0 standard drink of alcohol     Types: 1 Glasses of wine per week     Comment: occasional    Drug use: No    Sexual activity: Not Currently     Partners: Male     Review of Systems   Respiratory:          Reports having stage IV lung cancer   Cardiovascular:  Negative for palpitations.   Gastrointestinal:  Positive for abdominal pain, diarrhea, nausea and vomiting.     Objective:     Vital Signs (Most Recent):  Temp: 99 °F (37.2 °C) (10/07/24 1716)  Pulse: (!) 122 (10/07/24  1926)  Resp: 20 (10/07/24 1916)  BP: 131/69 ( & Ruben NP wanted another dose to be given) (10/07/24 2058)  SpO2: 100 % (10/07/24 1926) Vital Signs (24h Range):  Temp:  [97.9 °F (36.6 °C)-99 °F (37.2 °C)] 99 °F (37.2 °C)  Pulse:  [] 122  Resp:  [20-26] 20  SpO2:  [91 %-100 %] 100 %  BP: (124-152)/(69-93) 131/69     Weight: 54.9 kg (121 lb 0.5 oz)  Body mass index is 20.78 kg/m².     Physical Exam  Constitutional:       General: She is awake.      Appearance: She is cachectic.      Interventions: Nasal cannula in place.   HENT:      Head: Normocephalic and atraumatic.   Eyes:      Extraocular Movements: Extraocular movements intact.      Pupils: Pupils are equal, round, and reactive to light.   Cardiovascular:      Rate and Rhythm: Tachycardia present. Rhythm irregular.      Pulses:           Radial pulses are 2+ on the right side and 2+ on the left side.        Posterior tibial pulses are 2+ on the right side and 2+ on the left side.   Pulmonary:      Effort: Accessory muscle usage present.      Breath sounds: Decreased air movement present.   Abdominal:      General: Abdomen is scaphoid.      Palpations: Abdomen is soft.      Tenderness: There is generalized abdominal tenderness.   Musculoskeletal:      Cervical back: Normal range of motion and neck supple.   Skin:     General: Skin is warm.      Capillary Refill: Capillary refill takes less than 2 seconds.   Neurological:      General: No focal deficit present.      Mental Status: She is alert and oriented to person, place, and time. Mental status is at baseline.   Psychiatric:         Attention and Perception: Attention normal.         Mood and Affect: Mood normal.         Speech: Speech normal.         Behavior: Behavior is cooperative.         Thought Content: Thought content normal.         Cognition and Memory: Cognition normal.              CRANIAL NERVES     CN III, IV, VI   Pupils are equal, round, and reactive to light.       Significant  Labs: All pertinent labs within the past 24 hours have been reviewed.  Recent Lab Results  (Last 5 results in the past 24 hours)        10/07/24  1914   10/07/24  1808   10/07/24  1802   10/07/24  1757   10/07/24  1713        Influenza A, Molecular       Negative         Influenza B, Molecular       Negative         Albumin         3.0       ALP         49       ALT         13       Anion Gap         9       Appearance, UA Clear               AST         13       Bacteria, UA Occasional               Baso #         0.01       Basophil %         0.3       Bilirubin (UA) Negative               BILIRUBIN TOTAL         0.5  Comment: For infants and newborns, interpretation of results should be based  on gestational age, weight and in agreement with clinical  observations.    Premature Infant recommended reference ranges:  Up to 24 hours.............<8.0 mg/dL  Up to 48 hours............<12.0 mg/dL  3-5 days..................<15.0 mg/dL  6-29 days.................<15.0 mg/dL         BNP         532  Comment: Values of less than 100 pg/ml are consistent with non-CHF populations.       BUN         28       Calcium         8.9       Chloride         99       CO2         29       Color, UA Yellow               Creatinine         1.2       Differential Method         Automated       eGFR         44.9       Eos #         0.0       Eos %         0.3       Flu A & B Source       Nasal swab         Glucose         109       Glucose, UA Negative               Gran # (ANC)         2.7       Gran %         82.7       Hematocrit         33.8       Hemoglobin         10.1       Hyaline Casts, UA 0               Immature Grans (Abs)         0.05  Comment: Mild elevation in immature granulocytes is non specific and   can be seen in a variety of conditions including stress response,   acute inflammation, trauma and pregnancy. Correlation with other   laboratory and clinical findings is essential.         Immature Granulocytes          1.5       INR         1.1  Comment: Coumadin Therapy:  2.0 - 3.0 for INR for all indicators except mechanical heart valves  and antiphospholipid syndromes which should use 2.5 - 3.5.         Ketones, UA Negative               Leukocyte Esterase, UA Negative               Lipase         40       Lymph #         0.3       Lymph %         9.1       Magnesium          1.8       MCH         30.8       MCHC         29.9       MCV         103       Microscopic Comment SEE COMMENT  Comment: Other formed elements not mentioned in the report are not   present in the microscopic examination.                  Mono #         0.2       Mono %         6.1       MPV         9.3       NITRITE UA Negative               nRBC         0       Blood, UA TRACE               pH, UA 6.0               Phosphorus Level         3.2       Platelet Count         171       POC Creatinine   1.1             POC Lactate     1.01           Potassium         4.1       PROTEIN TOTAL         6.9       Protein, UA 1+  Comment: Recommend a 24 hour urine protein or a urine   protein/creatinine ratio if globulin induced proteinuria is  clinically suspected.                 PT         12.4       RBC         3.28       RBC, UA 2               RDW         18.3       Sample   VENOUS   VENOUS           SARS-CoV-2 RNA, Amplification, Qual       Negative  Comment: This test utilizes isothermal nucleic acid amplification technology   to   detect the SARS-CoV-2 RdRp nucleic acid segment. The analytical   sensitivity   (limit of detection) is 500 copies/swab.     A POSITIVE result is indicative of the presence of SARS-CoV-2 RNA;   clinical   correlation with patient history and other diagnostic information is   necessary to determine patient infection status.    A NEGATIVE result means that SARS-CoV-2 nucleic acids are not present   above   the limit of detection. A NEGATIVE result should be treated as   presumptive.   It does not rule out the possibility of COVID-19  and should not be   the sole   basis for treatment decisions. If COVID-19 is strongly suspected   based on   clinical and exposure history, re-testing using an alternate   molecular assay   should be considered.     This test is FDA approved.Performance characteristics of this test   has been   independently verified by [facility] in conjunction   with   [facility] Department of Pathology and Laboratory   Medicine.           Sodium         137       Spec Grav UA 1.030               Specimen UA Urine, Clean Catch               Squam Epithel, UA 4               Troponin I High Sensitivity         13.3  Comment: Troponin results differ between methods. Do not use   results between Troponin methods interchangeably.    Alkaline Phospatase levels above 400 U/L may   cause false positive results.    Access hsTnI should not be used for patients taking   Asfotase lizzeth (Strensiq).         TSH         0.876       UROBILINOGEN UA Negative               WBC, UA 2               WBC         3.29                              Significant Imaging: I have reviewed all pertinent imaging results/findings within the past 24 hours.    CTA Chest Abdomen Pelvis  Narrative: EXAMINATION:  CTA CHEST ABDOMEN PELVIS    CLINICAL HISTORY:  mesenteric ischemia eval vs aortic eval;    TECHNIQUE:  Low dose axial images, sagittal and coronal reformations were obtained from the thoracic inlet through the pelvis following the IV administration of 100 mL of Omnipaque 350 .  No oral contrast was administered.    COMPARISON:  Chest radiograph 06/19/2024, CT chest abdomen pelvis 12/10/2023    FINDINGS:  Cardiomegaly.  No pericardial effusion.  No thoracic aortic aneurysm.  No acute pulmonary embolus.    No enlarged axillary or mediastinal nodes.  No enlarged hilar lymph nodes.    Loculated right pleural effusion similar to prior.  New small left pleural effusion.  Basilar predominant consolidation.  Central airways are clear.   Postsurgical changes of right lower lobectomy.  Diffuse ground-glass opacities, interlobular septal thickening and bronchial wall thickening increased compared to prior exam and now involving the left lung.  Bronchocentric consolidation in the right upper lobe.  Pulmonary emphysema.    Liver is normal in morphology and with smooth contour.  Stable right hepatic lobe cyst.    The spleen, pancreas, adrenal glands and kidneys are normal.  No intra or extrahepatic biliary ductal dilatation.  The gallbladder is surgically absent.  Right renal cysts better evaluated on prior CT.    Abdominal aorta with severe atherosclerotic calcification..  No aortic aneurysm or dissection.  The renal arteries and mesenteric arteries are patent.  No enlarged retroperitoneal lymph nodes.    Colonic diverticula.  New mesenteric stranding, worse about the ascending colon.  No bowel dilatation.  No organized fluid collection or free air.    Bladder is smooth walled.  Uterus is absent.  No enlarged pelvic lymph nodes.    Postsurgical changes of right femur ORIF with hardware intact.  Dextroscoliosis of the lumbar spine.  Bilateral breast implants in place.  Impression: Postsurgical changes of right lower lobectomy.  Loculated right pleural effusion similar to prior.  New left pleural effusion and worsening diffuse bilateral airspace opacities interlobular septal thickening, concerning for progression of malignancy and or superimposed pneumonia.    New mesenteric stranding worse along the ascending colon.  Findings could be due to volume overload or infectious/inflammatory colitis.    Severe aortic atheromatous plaque.  No aortic aneurysm or dissection.  The renal and mesenteric arteries are patent.    This report was flagged in Epic as abnormal.    Electronically signed by: Joycelyn Taveras  Date:    10/07/2024  Time:    19:09  X-Ray Chest AP Portable  Narrative: EXAMINATION:  XR CHEST AP PORTABLE    CLINICAL  HISTORY:  Sepsis;    TECHNIQUE:  Single frontal view of the chest was performed.    COMPARISON:  06/19/2024    FINDINGS:  There is a right-sided MediPort catheter in similar position.  Cardiac monitoring leads overlie the chest.  There is stable mild rightward deviation of the trachea.  Cardiac silhouette is stable in size and configuration.  There is aortic atherosclerosis.  There is a large loculated right-sided pleural effusion.  There are diffuse patchy airspace opacities throughout the aerated right lung as well as the left lung which may reflect edema, aspiration, or multifocal infection.  No large volume of left-sided pleural fluid appreciated radiographically.  There are bilateral calcified breast prosthesis present.  No definite pneumothorax identified.  Osseous structures demonstrate degenerative changes.  Impression: Abnormal chest radiograph as above.    Electronically signed by: Virgil Gongora MD  Date:    10/07/2024  Time:    19:05

## 2024-10-08 NOTE — ASSESSMENT & PLAN NOTE
"Patient has a diagnosis of pneumonia. The cause of the pneumonia is unknown at this time.  Pneumonia superimposed on stage IV lung cancer and immunocompromised patient.  The pneumonia is  initial assessment . The patient has the following signs/symptoms of pneumonia: persistent hypoxia  and shortness of breath. The patient does have a current oxygen requirement and the patient does have a home oxygen requirement. I have reviewed the pertinent imaging. The following cultures have been collected: Sputum culture The culture results are listed below.     Current antimicrobial regimen consists of the antibiotics listed below. Will monitor patient closely and continue current treatment plan unchanged.    Antibiotics (From admission, onward)      Start     Stop Route Frequency Ordered    10/08/24 0900  cefepime 1g in dextrose 5% 100 mL IVPB (ready to mix)         -- IV Every 12 hours (non-standard times) 10/07/24 2118    10/07/24 2215  mupirocin 2 % ointment         10/12/24 2059 Nasl 2 times daily 10/07/24 2107    10/07/24 2100  cefepime 2 g in dextrose 5% 100 mL IVPB (ready to mix)         -- IV Once 10/07/24 1955    10/07/24 2100  vancomycin in dextrose 5 % 1 gram/250 mL IVPB 1,000 mg         -- IV Once 10/07/24 2000    10/07/24 2054  vancomycin - pharmacy to dose  (vancomycin IVPB (PEDS and ADULTS))        Placed in "And" Linked Group    -- IV pharmacy to manage frequency 10/07/24 1955            Microbiology Results (last 7 days)       Procedure Component Value Units Date/Time    Respiratory Infection Panel (PCR), Nasopharyngeal [[id number]]     Order Status: No result Specimen: Nasopharyngeal Swab     Blood culture x two cultures. Draw prior to antibiotics. [[id number]] Collected: 10/07/24 1753    Order Status: Sent Specimen: Blood from Peripheral, Hand, Left Updated: 10/07/24 1806    Blood culture x two cultures. Draw prior to antibiotics. [[id number]] Collected: 10/07/24 1758    Order Status: Sent Specimen: Blood " from Peripheral, Hand, Right Updated: 10/07/24 0059

## 2024-10-08 NOTE — HPI
84-year-old female who presented to the ED from outpatient clinic via EMS with reports of abdominal pain, N/V/D x3 days found to be in atrial fib with RVR.  Patient reports that she has stage IV lung cancer and went to her clinic for follow up.  She states that she was having and has had severe abdominal pain with NVD since her last chemotherapy.  Patient denies any feelings of chest palpitations.  Had discussion with patient regarding being placed on a ventilator or having chest compressions. She stated that she did not want to have those things done and she was made a DNR in the system.     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  Loculated right pleural effusion similar to prior.  New left pleural effusion and worsening diffuse bilateral airspace opacities interlobular septal thickening concerning for progression of malignancy or superimposed pneumonia.  New mesenteric stranding worse along the ascending colon.  Could be volume overload or infectious/inflammatory colitis.  Severe aortic atheromatous plaque.  Blood work performed in ED BUN 28, WBCs 3.29, hemoglobin 10.1, hematocrit 33.8, platelets 171, .  Initial troponin 13.3

## 2024-10-08 NOTE — ASSESSMENT & PLAN NOTE
Patients blood pressure range in the last 24 hours was: BP  Min: 124/70  Max: 152/89.The patient's inpatient anti-hypertensive regimen is listed below:  Current Antihypertensives  lisinopriL tablet 40 mg, Daily, Oral  metoprolol succinate (TOPROL-XL) 24 hr tablet 25 mg, Daily, Oral  diltiaZEM injection 13.5 mg, Once, Intravenous  diltiaZEM 100 mg in dextrose 5% 100 mL IVPB (ready to mix) (titrating), Continuous, Intravenous    Plan  - BP is uncontrolled, will adjust as follows:  See list above holding lisinopril and metoprolol today we will restart p.o. metoprolol in a.m. patient placed on Cardizem drip

## 2024-10-08 NOTE — HOSPITAL COURSE
The patient was admitted and placed on a cardizem drip. Echo and cardiology consult were ordered. Her home beta blocker was restarted. She was seen by pulmonology, who did not feel patient has pneumonia, and stopped abx. Pulmonology felt pleural effusion was due to afib with RVR. One dose of IV lasix was given. Her kidney function was monitored.  Seen by Cardiology and loading dose of amiodarone started in addition to weaning diltiazem drip.  Underwent VASILIY/CV 10/10 with return to sinus rhythm.  Cardiology recommends:   Continue amiodarone 200 mg BID  Continue Toprol-XL 25 mg daily  Anticoagulated w/ Eliquis, dose reduced to 2.5 mg b.i.d., presumably d/t platelet drop  Continue to hold Plavix and aspirin and closely monitor CBC (daily)  Echocardiogram showed Efx 40-45%, moderate MV stenosis  Okay to discharge from Cardiology standpoint with follow up in the clinic in 1-2 weeks  Repeat CBC in next few days outpatient.

## 2024-10-08 NOTE — ASSESSMENT & PLAN NOTE
Creatine stable for now. BMP reviewed- noted Estimated Creatinine Clearance: 30.7 mL/min (based on SCr of 1.2 mg/dL). according to latest data. Based on current GFR, CKD stage is stage 3 - GFR 30-59.  Monitor UOP and serial BMP and adjust therapy as needed. Renally dose meds. Avoid nephrotoxic medications and procedures.     Most Recent Today 11 d ago 12 d ago 2 wk ago 3 wk ago 1 mo ago 1 mo ago   CHEM PROFILE   BUN 28 High  (Today) 28 High  20 23 28 High  30 High  21 24 High    Creatinine 1.2 (Today) 1.2 1.2 1.3 1.3 1.2 1.1 1.1   eGFR if non  35.3 Abnormal   (2 yr ago)          eGFR if African American 40.6 Abnormal  (2 yr ago)

## 2024-10-08 NOTE — ASSESSMENT & PLAN NOTE
With c/o N/V/D since her last treatment  Zofran PRN   No diarrhea currently  Reports slightly better at this time  Morphine 1 mg IV q4h prn

## 2024-10-08 NOTE — PROGRESS NOTES
Pulmonary/Critical Care Consult      PATIENT NAME: Alexa Otero  MRN: [id number]  TODAY'S DATE: 10/08/2024  9:41 AM  ADMIT DATE: 10/7/2024  AGE: 84 y.o. 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  Two weeks ago, shortly after her treatment, she developed severe abdominal pain which is precluding her from eating.  She has become very weak. She has been in paroxysmal A fib at least since  when she was supposed to follow up with her electrophysiologist but hasn't.  I had restarted her amiodarone and eliquis.  Today, she is still in A fib c RVR. Her CXR shows a new L effusion and increased ground glass markings bilaterally.  This is probably due to the AFib with RVR as opposed to a pneumonia which she has no symptoms of or progression of her disease which her markedly improved PET scan of 10/1 would argue against.  The patient occasionally produces sputum but much less now that her cancer has been treated.  She is not produced any purulent sputum.  Will try to get a sputum culture just for completeness sake.    REVIEW OF SYSTEMS  GENERAL: Feeling weak  EYES: Vision is good.  ENT: No sinusitis or pharyngitis.   HEART:  She can not feel her atrial fib  LUNGS:  She is coughing up some clear mucus  GI:  The severe abdominal pain she has had for 2 weeks has relented.  She had 1 large diarrheal stool.  She vomited everything she had eaten on .  : No dysuria, hesitancy, or nocturia.  SKIN: No lesions or rashes.  MUSCULOSKELETAL:  Her  is having to help her stand up because she was so weak.  NEURO: No headaches or neuropathy.  LYMPH: No edema or adenopathy.  PSYCH: No anxiety or depression.  ENDO: No weight change.    ALLERGIES  Review of patient's allergies indicates:  No Known Allergies    INPATIENT SCHEDULED MEDICATIONS    apixaban  5 mg Oral BID    aspirin  81 mg Oral Daily    ceFEPime IV (PEDS and ADULTS)  1 g Intravenous Q12H    clopidogreL  75 mg Oral Daily    diphenhydrAMINE  25 mg Intravenous Once    folic acid  1 mg Oral Daily    gabapentin  100 mg Oral BID    magnesium oxide  400 mg Oral Daily    mupirocin   Nasal BID      dilTIAZem  0-15 mg/hr Intravenous Continuous 9 mL/hr at 10/08/24 0700 9 mg/hr at 10/08/24 0700       MEDICAL AND SURGICAL HISTORY  Past Medical History:   Diagnosis Date    Arthritis     Cardiac arrest 10/2023    Cataract     Chronic obstructive pulmonary disease, unspecified COPD type 3/20/2024    Colon polyp     Coronary artery disease 3/20/2024    Diabetes mellitus type II 2012    Encounter for blood transfusion     Extra-ovarian endometrioid adenocarcinoma 2020    GERD (gastroesophageal reflux disease)     History of chronic cough     clear productive    Hyperlipidemia     Hypertension     Macular degeneration     Ovarian cancer     PONV (postoperative nausea and vomiting)     Squamous cell carcinoma 1980's    precancer of cervix     Past Surgical History:   Procedure Laterality Date    AUGMENTATION OF BREAST      BRONCHOSCOPY N/A 12/12/2023    Procedure: BRONCHOSCOPY;  Surgeon: Neva Christian MD;  Location: [facility];  Service: ENT;  Laterality: N/A;    BRONCHOSCOPY WITH FLUOROSCOPY Right 05/11/2023    Procedure: BRONCHOSCOPY, WITH FLUOROSCOPY;  Surgeon: Neva Christian MD;  Location: [facility];  Service: Pulmonary;  Laterality: Right;    BRONCHOSCOPY WITH FLUOROSCOPY N/A 12/15/2023    Procedure: BRONCHOSCOPY, WITH FLUOROSCOPY;  Surgeon: Neva Christian MD;  Location: [facility];  Service: Pulmonary;  Laterality: N/A;    CATARACT EXTRACTION BILATERAL W/ ANTERIOR VITRECTOMY Bilateral     CHOLECYSTECTOMY      COLONOSCOPY      COLONOSCOPY N/A 04/20/2023    Procedure: COLONOSCOPY;  Surgeon: Sabino Stephenson MD;  Location: [facility];  Service: Endoscopy;  Laterality: N/A;    CORONARY STENT PLACEMENT   10/2023    x 3    ESOPHAGOGASTRODUODENOSCOPY N/A 06/20/2018    Procedure: EGD (ESOPHAGOGASTRODUODENOSCOPY);  Surgeon: Sabino Stephenson MD;  Location: [facility] ENDO;  Service: Endoscopy;  Laterality: N/A;    ESOPHAGOGASTRODUODENOSCOPY N/A 03/31/2023    Procedure: EGD (ESOPHAGOGASTRODUODENOSCOPY);  Surgeon: Sabino Stephenson MD;  Location: [facility] ENDO;  Service: Endoscopy;  Laterality: N/A;    ESOPHAGOGASTRODUODENOSCOPY N/A 12/11/2023    Procedure: EGD (ESOPHAGOGASTRODUODENOSCOPY);  Surgeon: Pretty Salgado MD;  Location: [facility];  Service: Endoscopy;  Laterality: N/A;    HYSTERECTOMY  1976    partial    INJECTION OF ANESTHETIC AGENT AROUND MEDIAL BRANCH NERVES INNERVATING LUMBAR FACET JOINT Right 05/10/2023    Procedure: Block-nerve-Lateral-branch-lumbar;  Surgeon: Agustin Robles MD;  Location: [facility];  Service: Pain Management;  Laterality: Right;  L5 and s1,s2 LBB    INJECTION OF ANESTHETIC AGENT AROUND MEDIAL BRANCH NERVES INNERVATING LUMBAR FACET JOINT Right 05/30/2023    Procedure: Block-nerve-medial branch-lumbar;  Surgeon: Agustin Robles MD;  Location: [facility] beds [facility] OR;  Service: Pain Management;  Laterality: Right;  L5, s1 ,s2 LBB #2    INJECTION OF ANESTHETIC AGENT AROUND NERVE Right 10/31/2023    Procedure: INTERCOSTAL NERVE BLOCK;  Surgeon: Washington Shankar MD;  Location: [facility] OR;  Service: Cardiothoracic;  Laterality: Right;    INJECTION, SACROILIAC JOINT Right 01/26/2023    Procedure: INJECTION,SACROILIAC JOINT;  Surgeon: Agustin Robles MD;  Location: [facility] beds at [facility] OR;  Service: Pain Management;  Laterality: Right;    INSERTION OF TUNNELED CENTRAL VENOUS CATHETER (CVC) WITH SUBCUTANEOUS PORT Right 10/19/2020    Procedure: INSERTION, PORT-A-CATH;  Surgeon: Misti Mcfarland MD;  Location: [facility] OR;  Service: General;  Laterality: Right;    INTRAMEDULLARY RODDING OF TROCHANTER OF FEMUR Right 11/28/2021    Procedure: INSERTION, INTRAMEDULLARY LUC, FEMUR, TROCHANTER/RIGHT TFN DR FAJARDO NOTIFIED REP;  Surgeon: Kp Fajardo MD;   Location: [facility];  Service: Orthopedics;  Laterality: Right;  SKIP    ROBOT-ASSISTED LAPAROSCOPIC LYMPHADENECTOMY USING DA NELLA XI N/A 2020    Procedure: XI ROBOTIC LYMPHADENECTOMY-pelvic and kell-aortic;  Surgeon: Altagracia Ray MD;  Location: [facility] OR;  Service: OB/GYN;  Laterality: N/A;    ROBOT-ASSISTED LAPAROSCOPIC OMENTECTOMY USING DA NELLA XI N/A 2020    Procedure: XI ROBOTIC OMENTECTOMY;  Surgeon: Altagracia Ray MD;  Location: [facility] OR;  Service: OB/GYN;  Laterality: N/A;    ROBOT-ASSISTED LAPAROSCOPIC SALPINGO-OOPHORECTOMY USING DA NELLA XI Bilateral 2020    Procedure: XI ROBOTIC SALPINGO-OOPHORECTOMY;  Surgeon: Altagracia Ray MD;  Location: [facility] OR;  Service: OB/GYN;  Laterality: Bilateral;    THORACOSCOPIC BIOPSY OF PLEURA Right 10/31/2023    Procedure: VATS, WITH PLEURA BIOPSY;  Surgeon: Washington Shankar MD;  Location: [facility];  Service: Cardiothoracic;  Laterality: Right;  FROZEN SECTION   **KRISTEN-ASSISDT**    THORACOTOMY Right 10/31/2023    Procedure: THORACOTOMY;  Surgeon: Washington Shankar MD;  Location: [facility];  Service: Cardiothoracic;  Laterality: Right;    UPPER GASTROINTESTINAL ENDOSCOPY         ALCOHOL, TOBACCO AND DRUG USE  Social History     Tobacco Use   Smoking Status Former    Current packs/day: 0.00    Average packs/day: 1 pack/day for 20.0 years (20.0 ttl pk-yrs)    Types: Cigarettes    Start date:     Quit date:     Years since quittin.7   Smokeless Tobacco Never   Tobacco Comments    quit 40 yrs ago     Social History     Substance and Sexual Activity   Alcohol Use Yes    Alcohol/week: 1.0 standard drink of alcohol    Types: 1 Glasses of wine per week    Comment: occasional     Social History     Substance and Sexual Activity   Drug Use No       FAMILY HISTORY  Family History   Problem Relation Name Age of Onset    Cancer Mother  57        lung    Cancer Father  56        oral    Skin cancer Sister      Skin cancer Brother       Melanoma Brother      Skin cancer Brother      Breast cancer Maternal Grandmother      Breast cancer Paternal Grandmother      Colon polyps Daughter      Psoriasis Neg Hx      Lupus Neg Hx      Eczema Neg Hx      Colon cancer Neg Hx      Crohn's disease Neg Hx      Esophageal cancer Neg Hx      Stomach cancer Neg Hx      Ulcerative colitis Neg Hx         VITAL SIGNS (MOST RECENT)  Temp: 99 °F (37.2 °C) (10/07/24 1716)  Pulse: 110 (10/08/24 0830)  Resp: 17 (10/08/24 0830)  BP: 103/65 (10/08/24 0830)  SpO2: 100 % (10/08/24 0830)    INTAKE AND OUTPUT (LAST 24 HOURS):No intake or output data in the 24 hours ending 10/08/24 0941    WEIGHT  Wt Readings from Last 1 Encounters:   10/07/24 54.9 kg (121 lb 0.5 oz)       PHYSICAL EXAM  GENERAL: Older patient in no distress.  HEENT: Pupils equal and reactive. Extraocular movements intact. Nose intact. Pharynx moist.  NECK: Supple.   HEART:  Irregularly irregular and tachycardic rate and rhythm. No murmur or gallop auscultated.  LUNGS:  The right lung has consolidated breath sounds and decreased breath sounds throughout.  There are crackles in the left base.  Lung excursion symmetrical. No change in fremitus.   ABDOMEN: Bowel sounds present. Non-tender, no masses palpated.  : Normal anatomy.  EXTREMITIES: Normal muscle tone and joint movement, no cyanosis or clubbing.   LYMPHATICS: No adenopathy palpated, no edema.  SKIN: Dry, intact, no lesions.   NEURO: Cranial nerves II-XII intact. Motor strength 5/5 bilaterally, upper and lower extremities.  PSYCH: Appropriate affect      CBC LAST (LAST 24 HOURS)  Recent Labs   Lab 10/08/24  0513   WBC 2.41*   RBC 2.54*   HGB 8.0*   HCT 26.1*   *   MCH 31.5*   MCHC 30.7*   RDW 18.0*   *   MPV 9.4   GRAN 66.9  1.6*   LYMPH 20.3  0.5*   MONO 10.8  0.3   BASO 0.01   NRBC 0       CHEMISTRY LAST (LAST 24 HOURS)  Recent Labs   Lab 10/07/24  1713 10/08/24  0513    137   K 4.1 3.8   CL 99 102   CO2 29 29   ANIONGAP 9 6*    BUN 28* 22   CREATININE 1.2 1.0    110   CALCIUM 8.9 7.9*   MG 1.8  --    ALBUMIN 3.0* 2.5*   PROT 6.9 5.5*   ALKPHOS 49* 38*   ALT 13 8*   AST 13 11   BILITOT 0.5 0.5       COAGULATION LAST (LAST 24 HOURS)  Recent Labs   Lab 10/07/24  1713   INR 1.1       CARDIAC PROFILE (LAST 24 HOURS)  Recent Labs   Lab 10/07/24  1713   *   TROPONINIHS 13.3       LAST 7 DAYS MICROBIOLOGY   Microbiology Results (last 7 days)       Procedure Component Value Units Date/Time    Blood culture x two cultures. Draw prior to antibiotics. [[id number]] Collected: 10/07/24 1753    Order Status: Completed Specimen: Blood from Peripheral, Hand, Left Updated: 10/08/24 0158     Blood Culture, Routine No Growth to date    Narrative:      Aerobic and anaerobic    Blood culture x two cultures. Draw prior to antibiotics. [[id number]] Collected: 10/07/24 1758    Order Status: Completed Specimen: Blood from Peripheral, Hand, Right Updated: 10/08/24 0158     Blood Culture, Routine No Growth to date    Narrative:      Aerobic and anaerobic    Respiratory Infection Panel (PCR), Nasopharyngeal [[id number]] Collected: 10/07/24 2135    Order Status: Completed Specimen: Nasopharyngeal Swab Updated: 10/07/24 2323     Respiratory Infection Panel Source NP swab     Adenovirus Not Detected     Coronavirus 229E, Common Cold Virus Not Detected     Coronavirus HKU1, Common Cold Virus Not Detected     Coronavirus NL63, Common Cold Virus Not Detected     Coronavirus OC43, Common Cold Virus Not Detected     Comment: Coronavirus strains 229E, HKU1, NL63, and OC43 can cause the common   cold   and are not associated with the respiratory disease outbreak caused   by  the COVID-19 (SARS-CoV-2 novel Coronavirus) strain.           SARS-CoV2 (COVID-19) Qualitative PCR Not Detected     Human Metapneumovirus Not Detected     Human Rhinovirus/Enterovirus Not Detected     Influenza A (subtypes H1, H1-2009,H3) Not Detected     Influenza B Not Detected      Parainfluenza Virus 1 Not Detected     Parainfluenza Virus 2 Not Detected     Parainfluenza Virus 3 Not Detected     Parainfluenza Virus 4 Not Detected     Respiratory Syncytial Virus Not Detected     Bordetella Parapertussis (NI3950) Not Detected     Bordetella pertussis (ptxP) Not Detected     Chlamydia pneumoniae Not Detected     Mycoplasma pneumoniae Not Detected     Comment: Respiratory Infection Panel testing performed by Multiplex PCR.       Narrative:      Respiratory Infection Panel source->NP Swab            MOST RECENT IMAGING  CTA Chest Abdomen Pelvis  Narrative: EXAMINATION:  CTA CHEST ABDOMEN PELVIS    CLINICAL HISTORY:  mesenteric ischemia eval vs aortic eval;    TECHNIQUE:  Low dose axial images, sagittal and coronal reformations were obtained from the thoracic inlet through the pelvis following the IV administration of 100 mL of Omnipaque 350 .  No oral contrast was administered.    COMPARISON:  Chest radiograph 06/19/2024, CT chest abdomen pelvis 12/10/2023    FINDINGS:  Cardiomegaly.  No pericardial effusion.  No thoracic aortic aneurysm.  No acute pulmonary embolus.    No enlarged axillary or mediastinal nodes.  No enlarged hilar lymph nodes.    Loculated right pleural effusion similar to prior.  New small left pleural effusion.  Basilar predominant consolidation.  Central airways are clear.  Postsurgical changes of right lower lobectomy.  Diffuse ground-glass opacities, interlobular septal thickening and bronchial wall thickening increased compared to prior exam and now involving the left lung.  Bronchocentric consolidation in the right upper lobe.  Pulmonary emphysema.    Liver is normal in morphology and with smooth contour.  Stable right hepatic lobe cyst.    The spleen, pancreas, adrenal glands and kidneys are normal.  No intra or extrahepatic biliary ductal dilatation.  The gallbladder is surgically absent.  Right renal cysts better evaluated on prior CT.    Abdominal aorta with severe  atherosclerotic calcification..  No aortic aneurysm or dissection.  The renal arteries and mesenteric arteries are patent.  No enlarged retroperitoneal lymph nodes.    Colonic diverticula.  New mesenteric stranding, worse about the ascending colon.  No bowel dilatation.  No organized fluid collection or free air.    Bladder is smooth walled.  Uterus is absent.  No enlarged pelvic lymph nodes.    Postsurgical changes of right femur ORIF with hardware intact.  Dextroscoliosis of the lumbar spine.  Bilateral breast implants in place.  Impression: Postsurgical changes of right lower lobectomy.  Loculated right pleural effusion similar to prior.  New left pleural effusion and worsening diffuse bilateral airspace opacities interlobular septal thickening, concerning for progression of malignancy and or superimposed pneumonia.    New mesenteric stranding worse along the ascending colon.  Findings could be due to volume overload or infectious/inflammatory colitis.    Severe aortic atheromatous plaque.  No aortic aneurysm or dissection.  The renal and mesenteric arteries are patent.    This report was flagged in Epic as abnormal.    Electronically signed by: Joycelyn Taveras  Date:    10/07/2024  Time:    19:09  X-Ray Chest AP Portable  Narrative: EXAMINATION:  XR CHEST AP PORTABLE    CLINICAL HISTORY:  Sepsis;    TECHNIQUE:  Single frontal view of the chest was performed.    COMPARISON:  06/19/2024    FINDINGS:  There is a right-sided MediPort catheter in similar position.  Cardiac monitoring leads overlie the chest.  There is stable mild rightward deviation of the trachea.  Cardiac silhouette is stable in size and configuration.  There is aortic atherosclerosis.  There is a large loculated right-sided pleural effusion.  There are diffuse patchy airspace opacities throughout the aerated right lung as well as the left lung which may reflect edema, aspiration, or multifocal infection.  No large volume of left-sided pleural fluid  appreciated radiographically.  There are bilateral calcified breast prosthesis present.  No definite pneumothorax identified.  Osseous structures demonstrate degenerative changes.  Impression: Abnormal chest radiograph as above.    Electronically signed by: Virgil Gongora MD  Date:    10/07/2024  Time:    19:05      CURRENT VISIT EKG  Results for orders placed or performed during the hospital encounter of 10/07/24   EKG 12-lead   Result Value Ref Range    QRS Duration 66 ms    [facility] QTC Calculation 453 ms    Narrative    Test Reason : R07.9,    Vent. Rate : 137 BPM     Atrial Rate : 000 BPM     P-R Int : 000 ms          QRS Dur : 066 ms      QT Int : 300 ms       P-R-T Axes : 000 009 090 degrees     QTc Int : 453 ms    Atrial fibrillation with rapid ventricular response  Anteroseptal infarct (cited on or before 13-OCT-2023)  Abnormal ECG  When compared with ECG of 07-OCT-2024 17:03,  No significant change was found    Referred By: [email or web address]   SELF           Confirmed By:        ECHOCARDIOGRAM RESULTS  Results for orders placed during the hospital encounter of 10/13/23    Echo    Interpretation Summary    Left Ventricle: The left ventricle is normal in size. Normal wall thickness. Normal wall motion. There is normal systolic function with a visually estimated ejection fraction of 55 - 60%. Grade I diastolic dysfunction.    Right Ventricle: Normal right ventricular cavity size. Wall thickness is normal. Right ventricle wall motion  is normal. Systolic function is normal.    Aortic Valve: There is moderate aortic valve sclerosis.    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 There is mild mitral annular calcification present. There is no stenosis. The mean pressure gradient across the mitral valve is 3 mmHg at a heart rate of  bpm. There is mild regurgitation.    Tricuspid Valve: There is mild regurgitation.  No pulmonary hypertension.    IVC/SVC: Normal venous pressure at 3 mmHg.        Oxygen   "INFORMATION     3 L, the patient wears 2 at home         LAST ARTERIAL BLOOD GAS  ABG  No results for input(s): "PH", "PO2", "PCO2", "HCO3", "BE" in the last 168 hours.    IMPRESSION AND PLAN  Mild pulmonary edema with new left pleural effusion  - secondary to AFib and moderate malnutrition  -   - Lasix  - do not see a need for antibiotics  Paroxysmal atrial fib  - present since at least 9 19  - patient was supposed to follow up with Dr. Smith, but has not  - patient treated with amiodarone in the past from her electrophysiologist, so that is what I restarted  - continue Eliquis  Moderate malnutrition, moderate hypoalbuminemia  - has not been eating secondary to abdominal pain  Weakness  Lepidic pattern adenocarcinoma  - on Alimta  - improved PET scan  Anemia  - significant drop overnight  Leukopenia  - ANC adequate  Thrombocytopenia  - trend    Neva Christian MD  Date of Service: 10/08/2024  9:41 AM   "

## 2024-10-08 NOTE — ASSESSMENT & PLAN NOTE
With c/o N/V/D since her last treatment  Zofran PRN no bouts of nause/vomiting in ED  No diarrhea while in the ED  Reports slightly better at this time  Morphine 1 mg IV q4h prn

## 2024-10-08 NOTE — SUBJECTIVE & OBJECTIVE
Interval History: Patient states she feels a little better.    Review of Systems   Constitutional:  Negative for activity change, appetite change, chills and fever.   HENT:  Negative for congestion, ear pain, nosebleeds and sinus pain.    Eyes:  Negative for discharge and itching.   Respiratory:  Negative for apnea, cough, chest tightness and shortness of breath.    Cardiovascular:  Negative for chest pain, palpitations and leg swelling.   Gastrointestinal:  Positive for abdominal pain, diarrhea, nausea and vomiting. Negative for abdominal distention.   Genitourinary:  Negative for difficulty urinating, dysuria, flank pain and frequency.   Musculoskeletal:  Negative for arthralgias, back pain, joint swelling and myalgias.   Skin:  Negative for color change, pallor and rash.   Neurological:  Negative for dizziness, weakness, light-headedness and headaches.   Psychiatric/Behavioral:  Negative for agitation, behavioral problems, confusion and suicidal ideas.      Objective:     Vital Signs (Most Recent):  Temp: 99 °F (37.2 °C) (10/07/24 1716)  Pulse: (!) 111 (10/08/24 1144)  Resp: 16 (10/08/24 1144)  BP: (!) 103/55 (10/08/24 1144)  SpO2: 100 % (10/08/24 1144) Vital Signs (24h Range):  Temp:  [97.9 °F (36.6 °C)-99 °F (37.2 °C)] 99 °F (37.2 °C)  Pulse:  [] 111  Resp:  [4-26] 16  SpO2:  [91 %-100 %] 100 %  BP: ()/(52-93) 103/55     Weight: 54.9 kg (121 lb 0.5 oz)  Body mass index is 20.78 kg/m².    Intake/Output Summary (Last 24 hours) at 10/8/2024 1237  Last data filed at 10/8/2024 1203  Gross per 24 hour   Intake 200 ml   Output --   Net 200 ml         Physical Exam  Vitals reviewed.   Constitutional:       General: She is not in acute distress.     Appearance: Normal appearance. She is normal weight. She is not ill-appearing.      Comments: Cachectic   HENT:      Head: Normocephalic and atraumatic.      Nose: Nose normal.      Mouth/Throat:      Mouth: Mucous membranes are moist.      Pharynx: Oropharynx is  clear.   Eyes:      General: No scleral icterus.     Extraocular Movements: Extraocular movements intact.      Conjunctiva/sclera: Conjunctivae normal.      Pupils: Pupils are equal, round, and reactive to light.   Cardiovascular:      Rate and Rhythm: Tachycardia present. Rhythm irregular.      Pulses: Normal pulses.      Heart sounds: Normal heart sounds. No murmur heard.     No gallop.   Pulmonary:      Effort: Pulmonary effort is normal. No respiratory distress.      Breath sounds: No wheezing, rhonchi or rales.      Comments: Decreased air movement bases  Chest:      Chest wall: No tenderness.   Abdominal:      General: Abdomen is flat. There is no distension.      Palpations: Abdomen is soft.      Tenderness: There is abdominal tenderness.   Musculoskeletal:         General: No swelling or tenderness.      Cervical back: Normal range of motion and neck supple. No rigidity or tenderness.      Right lower leg: No edema.      Left lower leg: No edema.   Skin:     General: Skin is warm and dry.   Neurological:      General: No focal deficit present.      Mental Status: She is alert and oriented to person, place, and time. Mental status is at baseline.      Motor: No weakness.   Psychiatric:         Mood and Affect: Mood normal.         Behavior: Behavior normal.         Thought Content: Thought content normal.             Significant Labs: All pertinent labs within the past 24 hours have been reviewed.    Significant Imaging: I have reviewed all pertinent imaging results/findings within the past 24 hours.

## 2024-10-08 NOTE — ASSESSMENT & PLAN NOTE
Supplemental oxygen     May improve with conversion of atrial fibrillation   One dose of IV lasix given

## 2024-10-08 NOTE — PLAN OF CARE
[facility] - Emergency Dept  Initial Discharge Assessment       Primary Care Provider: Idalia Greco MD    Admission Diagnosis: Atrial fibrillation with RVR [I48.91]    Admission Date: 10/7/2024  Expected Discharge Date: 10/10/2024     met with the patient at the bedside to complete the initial discharge assessment plan. Demographics, PCP, and other information on the face sheet verified by patient as being correct.        The patient reported the following:       She has an Advance Directive and Living Will on file, her spouse is her HC POA. She lives with her spouse in a single story home.  DME: Home oxygen (portable concentrator and home stationary concentrator), 2 LPM, wih Rotek. Portable 02 concentrator at bedside. Pharmacy:[facility]. PCP: Idalia Greco- last seen about 6 months ago. She is independent in ADLs but reported that her daughter, Katerine, comes to the home every other week to help with meal prep, grocery shopping, and medication administration. She denies coumadin, HHC, and HD. Her spouse transport to/from MD appointments and will drive her home at discharge.      Anticipated DC Disposition: Home with family       CM will continue to follow and assess DC needs throughout this hospitalization.     Transition of Care Barriers: None    Payor: HU[employer]NA MANAGED MEDICARE / Plan: HU[employer]NA SNP HMO PPO SPECIAL NEEDS / Product Type: Medicare Advantage /     Extended Emergency Contact Information  Primary Emergency Contact: TRISTON LE  Address: [address] [place] [address], [place] [address] [place]  Mobile Phone: [phone]  Relation: Daughter  Secondary Emergency Contact: Porter Otero  Address: [address], [place] [address] [place] [address]  Home Phone: [phone]  Mobile Phone: [phone]  Relation: Spouse  Preferred language: English   needed? No    Discharge Plan A: Home with family  Discharge Plan B: Home Health      [facility]  [address]  [facility] [address]  Phone: [phone] Fax: [phone]    [facility] - [place], [place] - [address]  [address] [address]  Phone: [phone] Fax: [phone]      Initial Assessment (most recent)       Adult Discharge Assessment - 10/08/24 1247          Discharge Assessment    Assessment Type Discharge Planning Assessment     Confirmed/corrected address, phone number and insurance Yes     Confirmed Demographics Correct on Facesheet     Source of Information patient     When was your last doctors appointment? 04/10/24     Communicated ALYCIA with patient/caregiver Yes     Reason For Admission Bilateral Pneumonia     People in Home spouse     Do you expect to return to your current living situation? Yes     Do you have help at home or someone to help you manage your care at home? Yes     Who are your caregiver(s) and their phone number(s)? Porter Otero (spouse) [phone], Katerine (daughter)     Prior to hospitilization cognitive status: Alert/Oriented     Current cognitive status: Alert/Oriented     Walking or Climbing Stairs Difficulty no     Dressing/Bathing Difficulty no     Home Layout Able to live on 1st floor     Equipment Currently Used at Home oxygen     Readmission within 30 days? No     Do you currently have service(s) that help you manage your care at home? No     Do you take prescription medications? Yes     Do you have prescription coverage? Yes     Do you have any problems affording any of your prescribed medications? No     Who is going to help you get home at discharge? Spouse     How do you get to doctors appointments? family or friend will provide     Are you on dialysis? No     Do you take coumadin? No     Discharge Plan A Home with family     Discharge Plan B Home Health     Discharge Plan discussed with: Patient     Transition of Care Barriers None        Physical Activity    On average, how many  days per week do you engage in moderate to strenuous exercise (like a brisk walk)? 0 days     On average, how many minutes do you engage in exercise at this level? 0 min        Financial Resource Strain    How hard is it for you to pay for the very basics like food, housing, medical care, and heating? Not hard at all        Housing Stability    In the last 12 months, was there a time when you were not able to pay the mortgage or rent on time? No     At any time in the past 12 months, were you homeless or living in a shelter (including now)? No        Transportation Needs    Has the lack of transportation kept you from medical appointments, meetings, work or from getting things needed for daily living? No        Food Insecurity    Within the past 12 months, you worried that your food would run out before you got the money to buy more. Never true     Within the past 12 months, the food you bought just didn't last and you didn't have money to get more. Never true        Stress    Do you feel stress - tense, restless, nervous, or anxious, or unable to sleep at night because your mind is troubled all the time - these days? Not at all        Social Isolation    How often do you feel lonely or isolated from those around you?  Never        Alcohol Use    Q1: How often do you have a drink containing alcohol? Monthly or less     Q2: How many drinks containing alcohol do you have on a typical day when you are drinking? 1 or 2     Q3: How often do you have six or more drinks on one occasion? Never        Utilities    In the past 12 months has the electric, gas, oil, or water company threatened to shut off services in your home? No        Health Literacy    How often do you need to have someone help you when you read instructions, pamphlets, or other written material from your doctor or pharmacy? Never

## 2024-10-08 NOTE — PROGRESS NOTES
[facility] - Emergency Dept  Hospital Medicine  Progress Note    Patient Name: Alexa Otero  MRN: [id number]  Patient Class: IP- Inpatient   Admission Date: 10/7/2024  Length of Stay: 1 days  Attending Physician: Idalia Strong MD  Primary Care Provider: Idalia Greco MD        Subjective:     Principal Problem:Bilateral pneumonia        HPI:  84-year-old female who presented to the ED from outpatient clinic via EMS with reports of abdominal pain, N/V/D x3 days found to be in atrial fib with RVR.  Patient reports that she has stage IV lung cancer and went to her clinic for follow up.  She states that she was having and has had severe abdominal pain with NVD since her last chemotherapy.  Patient denies any feelings of chest palpitations.  Had discussion with patient regarding being placed on a ventilator or having chest compressions. She stated that she did not want to have those things done and she was made a DNR in the system.     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  Loculated right pleural effusion similar to prior.  New left pleural effusion and worsening diffuse bilateral airspace opacities interlobular septal thickening concerning for progression of malignancy or superimposed pneumonia.  New mesenteric stranding worse along the ascending colon.  Could be volume overload or infectious/inflammatory colitis.  Severe aortic atheromatous plaque.  Blood work performed in ED BUN 28, WBCs 3.29, hemoglobin 10.1, hematocrit 33.8, platelets 171, .  Initial troponin 13.3    Overview/Hospital Course:  The patient was admitted and placed on a cardizem drip. Echo and cardiology consult were ordered. Her home beta blocker was restarted. She was seen by pulmonology, who did not feel patient has pneumonia, and stopped abx. Pulmonology felt pleural effusion was due to afib with RVR. One dose of IV lasix was given. Her kidney function was monitored.    Interval History: Patient  states she feels a little better.    Review of Systems   Constitutional:  Negative for activity change, appetite change, chills and fever.   HENT:  Negative for congestion, ear pain, nosebleeds and sinus pain.    Eyes:  Negative for discharge and itching.   Respiratory:  Negative for apnea, cough, chest tightness and shortness of breath.    Cardiovascular:  Negative for chest pain, palpitations and leg swelling.   Gastrointestinal:  Positive for abdominal pain, diarrhea, nausea and vomiting. Negative for abdominal distention.   Genitourinary:  Negative for difficulty urinating, dysuria, flank pain and frequency.   Musculoskeletal:  Negative for arthralgias, back pain, joint swelling and myalgias.   Skin:  Negative for color change, pallor and rash.   Neurological:  Negative for dizziness, weakness, light-headedness and headaches.   Psychiatric/Behavioral:  Negative for agitation, behavioral problems, confusion and suicidal ideas.      Objective:     Vital Signs (Most Recent):  Temp: 99 °F (37.2 °C) (10/07/24 1716)  Pulse: (!) 111 (10/08/24 1144)  Resp: 16 (10/08/24 1144)  BP: (!) 103/55 (10/08/24 1144)  SpO2: 100 % (10/08/24 1144) Vital Signs (24h Range):  Temp:  [97.9 °F (36.6 °C)-99 °F (37.2 °C)] 99 °F (37.2 °C)  Pulse:  [] 111  Resp:  [4-26] 16  SpO2:  [91 %-100 %] 100 %  BP: ()/(52-93) 103/55     Weight: 54.9 kg (121 lb 0.5 oz)  Body mass index is 20.78 kg/m².    Intake/Output Summary (Last 24 hours) at 10/8/2024 1237  Last data filed at 10/8/2024 1203  Gross per 24 hour   Intake 200 ml   Output --   Net 200 ml         Physical Exam  Vitals reviewed.   Constitutional:       General: She is not in acute distress.     Appearance: Normal appearance. She is normal weight. She is not ill-appearing.      Comments: Cachectic   HENT:      Head: Normocephalic and atraumatic.      Nose: Nose normal.      Mouth/Throat:      Mouth: Mucous membranes are moist.      Pharynx: Oropharynx is clear.   Eyes:       General: No scleral icterus.     Extraocular Movements: Extraocular movements intact.      Conjunctiva/sclera: Conjunctivae normal.      Pupils: Pupils are equal, round, and reactive to light.   Cardiovascular:      Rate and Rhythm: Tachycardia present. Rhythm irregular.      Pulses: Normal pulses.      Heart sounds: Normal heart sounds. No murmur heard.     No gallop.   Pulmonary:      Effort: Pulmonary effort is normal. No respiratory distress.      Breath sounds: No wheezing, rhonchi or rales.      Comments: Decreased air movement bases  Chest:      Chest wall: No tenderness.   Abdominal:      General: Abdomen is flat. There is no distension.      Palpations: Abdomen is soft.      Tenderness: There is abdominal tenderness.   Musculoskeletal:         General: No swelling or tenderness.      Cervical back: Normal range of motion and neck supple. No rigidity or tenderness.      Right lower leg: No edema.      Left lower leg: No edema.   Skin:     General: Skin is warm and dry.   Neurological:      General: No focal deficit present.      Mental Status: She is alert and oriented to person, place, and time. Mental status is at baseline.      Motor: No weakness.   Psychiatric:         Mood and Affect: Mood normal.         Behavior: Behavior normal.         Thought Content: Thought content normal.             Significant Labs: All pertinent labs within the past 24 hours have been reviewed.    Significant Imaging: I have reviewed all pertinent imaging results/findings within the past 24 hours.    Assessment/Plan:      * Bilateral pneumonia  Ruled out by pulmonology      Malignant neoplasm of right lung stage 4  Noted         Generalized abdominal pain  With c/o N/V/D since her last treatment  Zofran PRN   No diarrhea currently  Reports slightly better at this time  Morphine 1 mg IV q4h prn     HTN (hypertension)  Patients blood pressure range in the last 24 hours was: BP  Min: 124/70  Max: 152/89.The patient's inpatient  anti-hypertensive regimen is listed below:  Current Antihypertensives  lisinopriL tablet 40 mg, Daily, Oral  metoprolol succinate (TOPROL-XL) 24 hr tablet 25 mg, Daily, Oral  diltiaZEM injection 13.5 mg, Once, Intravenous  diltiaZEM 100 mg in dextrose 5% 100 mL IVPB (ready to mix) (titrating), Continuous, Intravenous    Plan  - BP is uncontrolled, will adjust as follows:  See list above holding lisinopril and metoprolol today we will restart p.o. metoprolol in a.m. patient placed on Cardizem drip    Bilateral pleural effusion  Supplemental oxygen     May improve with conversion of atrial fibrillation   One dose of IV lasix given    Oxygen dependent  Continue oxygen as needed      Atrial fibrillation with RVR  Patient has paroxysmal (<7 days) atrial fibrillation. Patient is currently in atrial fibrillation. [id number] Score: 5. The patients heart rate in the last 24 hours is as follows:  Pulse  Min: 69  Max: 152     Antiarrhythmics  diltiaZEM 100 mg in dextrose 5% 100 mL IVPB (ready to mix) (titrating), Continuous, Intravenous    Anticoagulants  apixaban tablet 5 mg, 2 times daily, Oral    Plan  - Replete lytes with a goal of K>4, Mg >2  - Patient is anticoagulated, see medications listed above.  - Patient's afib is currently uncontrolled. Will adjust treatment as follows:  Start diltiazem weight based bolus with titratable drip .  We will keep Lopressor 5 mg IV push for breakthrough wants patient is at max of 15 milligrams/hour  hold patient's amiodarone continue patient's Eliquis, continue metoprolol          ACP (advance care planning)  Discussed code status with the patient she does not wish to be on the ventilator or have chest compressions.  She was changed to a DNR      CKD stage 3a, GFR 45-59 ml/min  Creatine stable for now. BMP reviewed- noted Estimated Creatinine Clearance: 30.7 mL/min (based on SCr of 1.2 mg/dL). according to latest data. Based on current GFR, CKD stage is stage 3 - GFR 30-59.  Monitor UOP  and serial BMP and adjust therapy as needed. Renally dose meds. Avoid nephrotoxic medications and procedures.     Most Recent Today 11 d ago 12 d ago 2 wk ago 3 wk ago 1 mo ago 1 mo ago   CHEM PROFILE   BUN 28 High  (Today) 28 High  20 23 28 High  30 High  21 24 High    Creatinine 1.2 (Today) 1.2 1.2 1.3 1.3 1.2 1.1 1.1   eGFR if non  35.3 Abnormal   (2 yr ago)          eGFR if African American 40.6 Abnormal  (2 yr ago)                VTE Risk Mitigation (From admission, onward)           Ordered     apixaban tablet 5 mg  2 times daily         10/07/24 2029     Reason for No Pharmacological VTE Prophylaxis  Once        Question:  Reasons:  Answer:  Already adequately anticoagulated on oral Anticoagulants    10/07/24 2029     IP VTE HIGH RISK PATIENT  Once         10/07/24 2029     Place sequential compression device  Until discontinued         10/07/24 2029                    Discharge Planning   ALYCIA:      Code Status: DNR   Is the patient medically ready for discharge?:     Reason for patient still in hospital (select all that apply): Patient trending condition                     Idalia Strong MD, MD  Department of Hospital Medicine   [facility] - Emergency Dept

## 2024-10-08 NOTE — PHARMACY MED REC
"Admission Medication History     The home medication history was taken by Sofie Robertson.    You may go to "Admission" then "Reconcile Home Medications" tabs to review and/or act upon these items.     The home medication list has been updated by the Pharmacy department.   Please read ALL comments highlighted in yellow.   Please address this information as you see fit.    Feel free to contact us if you have any questions or require assistance.      The medications listed below were removed from the home medication list. Please reorder if appropriate:  Patient reports no longer taking the following medication(s):  Melatonin  Zofran      Medications listed below were obtained from: Patient/family and Analytic software- [employer]  Current Facility-Administered Medications on File Prior to Encounter   Medication Dose Route Frequency Provider Last Rate Last Admin    diphenhydrAMINE injection 25 mg  25 mg Intravenous Once Patito Gibson NP-C        diphenhydrAMINE injection 25 mg  25 mg Intravenous PRN Patito Gibson NP-C         Current Outpatient Medications on File Prior to Encounter   Medication Sig Dispense Refill    amiodarone (PACERONE) 200 MG Tab Take 1 tablet (200 mg total) by mouth once daily. 60 tablet 11    apixaban (ELIQUIS) 5 mg Tab Take 1 tablet (5 mg total) by mouth 2 (two) times daily. 2 tablets twice a day for a week, then decrease to 1 tablet twice a day 60 tablet 11    aspirin (ECOTRIN) 81 MG EC tablet Take 81 mg by mouth once daily.      benazepriL (LOTENSIN) 40 MG tablet Take 0.5 tablets (20 mg total) by mouth once daily. 45 tablet 3    clopidogreL (PLAVIX) 75 mg tablet Take 1 tablet (75 mg total) by mouth once daily. 90 tablet 2    folic acid (FOLVITE) 1 MG tablet Take 1 tablet (1 mg total) by mouth once daily. 100 tablet 3    gabapentin (NEURONTIN) 100 MG capsule Take 1 capsule (100 mg total) by mouth 2 (two) times daily. 180 capsule 3    HYDROcodone-acetaminophen (NORCO) 5-325 mg per tablet " Take 1 tablet by mouth every 6 (six) hours as needed for Pain.      LORazepam (ATIVAN) 1 MG tablet Take 1 tablet (1 mg total) by mouth every 6 (six) hours as needed for Anxiety (insomnia). 30 tablet 2    magnesium oxide (MAG-OX) 400 mg (241.3 mg magnesium) tablet Take 1 tablet (400 mg total) by mouth once daily. Patient requested refill 90 tablet 3    metoprolol succinate (TOPROL-XL) 25 MG 24 hr tablet Take 1 tablet (25 mg total) by mouth once daily. 90 tablet 3    potassium chloride (K-TAB) 20 mEq Take 1 tablet by mouth once daily.      promethazine (PHENERGAN) 25 MG tablet Take 1 tablet (25 mg total) by mouth every 4 (four) hours as needed for Nausea. 30 tablet 5    VIT A/VIT C/VIT E/ZINC/COPPER (ICAPS AREDS ORAL) Take 1 capsule by mouth 2 (two) times a day.       [DISCONTINUED] melatonin 5 mg Chew Take 1 tablet by mouth nightly as needed (insomnia).      [DISCONTINUED] ondansetron (ZOFRAN) 8 MG tablet Take 1 tablet (8 mg total) by mouth every 8 (eight) hours as needed. 30 tablet 5    [DISCONTINUED] potassium chloride SA (K-DUR,KLOR-CON) 20 MEQ tablet Take 1 tablet daily 90 tablet 1           Sofie Robertson  EXT 1923                  .

## 2024-10-08 NOTE — CONSULTS
[facility]  Department of Cardiology  Consult Note      PATIENT NAME: Alexa Otero  MRN: [id number]  TODAY'S DATE: 10/08/2024  ADMIT DATE: 10/7/2024                          CONSULT REQUESTED BY: Idalia Strong MD    SUBJECTIVE     PRINCIPAL PROBLEM: Bilateral pneumonia      REASON FOR CONSULT:  Afib with RVR    HPI:  84-year-old female with PMH CAD, lung cancer stage IV, DM 2, who presented to the ED via EMS with reports of abdominal pain, N/V/D x3 days found to be in atrial fib with RVR.  Patient reports that she has stage IV lung cancer and went to her clinic for follow up.  She states that she was having and has had severe abdominal pain with NVD since her last chemotherapy.     Patient noted to be in atrial fibrillation with RVR on arrival to ER.  She denies prior history of atrial fibrillation but clinic notes indicate prior history.  She states she wore a heart monitor in the past that she thinks was normal.  She has been diagnosed with bilateral pneumonia.  She currently on IV Cardizem rate is variable from 100-120 bpm.  She states she does not feel palpitations or chest pain.  Her breathing is comfortable on low-flow oxygen.      Review of patient's allergies indicates:  No Known Allergies    Past Medical History:   Diagnosis Date    Arthritis     Cardiac arrest 10/2023    Cataract     Chronic obstructive pulmonary disease, unspecified COPD type 3/20/2024    Colon polyp     Coronary artery disease 3/20/2024    Diabetes mellitus type II 2012    Encounter for blood transfusion     Extra-ovarian endometrioid adenocarcinoma 2020    GERD (gastroesophageal reflux disease)     History of chronic cough     clear productive    Hyperlipidemia     Hypertension     Macular degeneration     Ovarian cancer     PONV (postoperative nausea and vomiting)     Squamous cell carcinoma 1980's    precancer of cervix     Past Surgical History:   Procedure Laterality Date    AUGMENTATION OF BREAST       BRONCHOSCOPY N/A 12/12/2023    Procedure: BRONCHOSCOPY;  Surgeon: Neva Christian MD;  Location: [facility] ENDO;  Service: ENT;  Laterality: N/A;    BRONCHOSCOPY WITH FLUOROSCOPY Right 05/11/2023    Procedure: BRONCHOSCOPY, WITH FLUOROSCOPY;  Surgeon: Neva Christian MD;  Location: [facility] ENDO;  Service: Pulmonary;  Laterality: Right;    BRONCHOSCOPY WITH FLUOROSCOPY N/A 12/15/2023    Procedure: BRONCHOSCOPY, WITH FLUOROSCOPY;  Surgeon: Neva Christian MD;  Location: [facility] ENDO;  Service: Pulmonary;  Laterality: N/A;    CATARACT EXTRACTION BILATERAL W/ ANTERIOR VITRECTOMY Bilateral     CHOLECYSTECTOMY      COLONOSCOPY      COLONOSCOPY N/A 04/20/2023    Procedure: COLONOSCOPY;  Surgeon: Sabino Stephenson MD;  Location: [facility] ENDO;  Service: Endoscopy;  Laterality: N/A;    CORONARY STENT PLACEMENT  10/2023    x 3    ESOPHAGOGASTRODUODENOSCOPY N/A 06/20/2018    Procedure: EGD (ESOPHAGOGASTRODUODENOSCOPY);  Surgeon: Sabino Stephenson MD;  Location: [facility];  Service: Endoscopy;  Laterality: N/A;    ESOPHAGOGASTRODUODENOSCOPY N/A 03/31/2023    Procedure: EGD (ESOPHAGOGASTRODUODENOSCOPY);  Surgeon: Sabino Stephenson MD;  Location: [facility];  Service: Endoscopy;  Laterality: N/A;    ESOPHAGOGASTRODUODENOSCOPY N/A 12/11/2023    Procedure: EGD (ESOPHAGOGASTRODUODENOSCOPY);  Surgeon: Pretty Salgado MD;  Location: [facility];  Service: Endoscopy;  Laterality: N/A;    HYSTERECTOMY  1976    partial    INJECTION OF ANESTHETIC AGENT AROUND MEDIAL BRANCH NERVES INNERVATING LUMBAR FACET JOINT Right 05/10/2023    Procedure: Block-nerve-Lateral-branch-lumbar;  Surgeon: Agustin Robles MD;  Location: [facility] OR;  Service: Pain Management;  Laterality: Right;  L5 and s1,s2 LBB    INJECTION OF ANESTHETIC AGENT AROUND MEDIAL BRANCH NERVES INNERVATING LUMBAR FACET JOINT Right 05/30/2023    Procedure: Block-nerve-medial branch-lumbar;  Surgeon: Agustin Robles MD;  Location: [facility] OR;  Service: Pain Management;  Laterality: Right;  L5, s1 ,s2 LBB  #2    INJECTION OF ANESTHETIC AGENT AROUND NERVE Right 10/31/2023    Procedure: INTERCOSTAL NERVE BLOCK;  Surgeon: Washington Shankar MD;  Location: [facility] OR;  Service: Cardiothoracic;  Laterality: Right;    INJECTION, SACROILIAC JOINT Right 01/26/2023    Procedure: INJECTION,SACROILIAC JOINT;  Surgeon: Agustin Robles MD;  Location: [facility] OR;  Service: Pain Management;  Laterality: Right;    INSERTION OF TUNNELED CENTRAL VENOUS CATHETER (CVC) WITH SUBCUTANEOUS PORT Right 10/19/2020    Procedure: INSERTION, PORT-A-CATH;  Surgeon: Misti Mcfarland MD;  Location: [facility] OR;  Service: General;  Laterality: Right;    INTRAMEDULLARY RODDING OF TROCHANTER OF FEMUR Right 11/28/2021    Procedure: INSERTION, INTRAMEDULLARY LUC, FEMUR, TROCHANTER/RIGHT TFN DR FAJARDO NOTIFIED REP;  Surgeon: Kp Fajardo MD;  Location: [facility];  Service: Orthopedics;  Laterality: Right;  SKIP    ROBOT-ASSISTED LAPAROSCOPIC LYMPHADENECTOMY USING DA NELLA XI N/A 09/21/2020    Procedure: XI ROBOTIC LYMPHADENECTOMY-pelvic and kell-aortic;  Surgeon: Altagracia Ray MD;  Location: [facility] OR;  Service: OB/GYN;  Laterality: N/A;    ROBOT-ASSISTED LAPAROSCOPIC OMENTECTOMY USING DA NELLA XI N/A 09/21/2020    Procedure: XI ROBOTIC OMENTECTOMY;  Surgeon: Altagracia Ray MD;  Location: [facility] OR;  Service: OB/GYN;  Laterality: N/A;    ROBOT-ASSISTED LAPAROSCOPIC SALPINGO-OOPHORECTOMY USING DA NELLA XI Bilateral 09/21/2020    Procedure: XI ROBOTIC SALPINGO-OOPHORECTOMY;  Surgeon: Altagracia Ray MD;  Location: [facility] OR;  Service: OB/GYN;  Laterality: Bilateral;    THORACOSCOPIC BIOPSY OF PLEURA Right 10/31/2023    Procedure: VATS, WITH PLEURA BIOPSY;  Surgeon: Washington Shankar MD;  Location: [facility] OR;  Service: Cardiothoracic;  Laterality: Right;  FROZEN SECTION   **KRISTEN-ASSISDT**    THORACOTOMY Right 10/31/2023    Procedure: THORACOTOMY;  Surgeon: Washington Shankar MD;  Location: [facility];  Service: Cardiothoracic;  Laterality: Right;    UPPER  GASTROINTESTINAL ENDOSCOPY       Social History     Tobacco Use    Smoking status: Former     Current packs/day: 0.00     Average packs/day: 1 pack/day for 20.0 years (20.0 ttl pk-yrs)     Types: Cigarettes     Start date:      Quit date:      Years since quittin.7    Smokeless tobacco: Never    Tobacco comments:     quit 40 yrs ago   Substance Use Topics    Alcohol use: Yes     Alcohol/week: 1.0 standard drink of alcohol     Types: 1 Glasses of wine per week     Comment: occasional    Drug use: No        REVIEW OF SYSTEMS  Negative except as mentioned in HPI    OBJECTIVE     VITAL SIGNS (Most Recent)  Temp: 99 °F (37.2 °C) (10/07/24 1716)  Pulse: 110 (10/08/24 0830)  Resp: 17 (10/08/24 0830)  BP: 103/65 (10/08/24 0830)  SpO2: 100 % (10/08/24 0830)    VENTILATION STATUS  Resp: 17 (10/08/24 0830)  SpO2: 100 % (10/08/24 0830)           I & O (Last 24H):No intake or output data in the 24 hours ending 10/08/24 0926    WEIGHTS  Wt Readings from Last 3 Encounters:   10/07/24 1750 54.9 kg (121 lb 0.5 oz)   10/07/24 1535 54.1 kg (119 lb 4.3 oz)   10/02/24 1026 56.6 kg (124 lb 11.2 oz)       PHYSICAL EXAM    GENERAL:  Elderly Female resting comfortably in bed in no apparent distress.  HEENT: Normocephalic.    NECK: No JVD.   CARDIAC:  Irregular tachycardic rate and rhythm. S1 is normal.S2 is normal.No gallops, clicks or murmurs noted at this time.  CHEST ANATOMY: normal.   LUNGS:  No cough, no conversational dyspnea, low-flow O2 in use. Breath sounds diminished at bases  ABDOMEN: Soft.   .  Normal bowel sounds.    EXTREMITIES: No edema  CENTRAL NERVOUS SYSTEM: AAO x 3  SKIN: No rash     HOME MEDICATIONS:  Current Facility-Administered Medications on File Prior to Encounter   Medication Dose Route Frequency Provider Last Rate Last Admin    diphenhydrAMINE injection 25 mg  25 mg Intravenous Once Patito Gibson NP-SABRINA        diphenhydrAMINE injection 25 mg  25 mg Intravenous PRN Patito Gibson NP-C          Current Outpatient Medications on File Prior to Encounter   Medication Sig Dispense Refill    amiodarone (PACERONE) 200 MG Tab Take 1 tablet (200 mg total) by mouth once daily. 60 tablet 11    apixaban (ELIQUIS) 5 mg Tab Take 1 tablet (5 mg total) by mouth 2 (two) times daily. 2 tablets twice a day for a week, then decrease to 1 tablet twice a day 60 tablet 11    aspirin (ECOTRIN) 81 MG EC tablet Take 81 mg by mouth once daily.      benazepriL (LOTENSIN) 40 MG tablet Take 0.5 tablets (20 mg total) by mouth once daily. 45 tablet 3    clopidogreL (PLAVIX) 75 mg tablet Take 1 tablet (75 mg total) by mouth once daily. 90 tablet 2    folic acid (FOLVITE) 1 MG tablet Take 1 tablet (1 mg total) by mouth once daily. 100 tablet 3    gabapentin (NEURONTIN) 100 MG capsule Take 1 capsule (100 mg total) by mouth 2 (two) times daily. 180 capsule 3    HYDROcodone-acetaminophen (NORCO) 5-325 mg per tablet Take 1 tablet by mouth every 6 (six) hours as needed for Pain.      LORazepam (ATIVAN) 1 MG tablet Take 1 tablet (1 mg total) by mouth every 6 (six) hours as needed for Anxiety (insomnia). 30 tablet 2    magnesium oxide (MAG-OX) 400 mg (241.3 mg magnesium) tablet Take 1 tablet (400 mg total) by mouth once daily. Patient requested refill 90 tablet 3    metoprolol succinate (TOPROL-XL) 25 MG 24 hr tablet Take 1 tablet (25 mg total) by mouth once daily. 90 tablet 3    potassium chloride (K-TAB) 20 mEq Take 1 tablet by mouth once daily.      promethazine (PHENERGAN) 25 MG tablet Take 1 tablet (25 mg total) by mouth every 4 (four) hours as needed for Nausea. 30 tablet 5    VIT A/VIT C/VIT E/ZINC/COPPER (ICAPS AREDS ORAL) Take 1 capsule by mouth 2 (two) times a day.          SCHEDULED MEDS:   apixaban  5 mg Oral BID    aspirin  81 mg Oral Daily    ceFEPime IV (PEDS and ADULTS)  1 g Intravenous Q12H    clopidogreL  75 mg Oral Daily    diphenhydrAMINE  25 mg Intravenous Once    folic acid  1 mg Oral Daily    gabapentin  100 mg Oral  BID    magnesium oxide  400 mg Oral Daily    mupirocin   Nasal BID       CONTINUOUS INFUSIONS:   dilTIAZem  0-15 mg/hr Intravenous Continuous 9 mL/hr at 10/08/24 0700 9 mg/hr at 10/08/24 0700       PRN MEDS:  Current Facility-Administered Medications:     acetaminophen, 650 mg, Oral, Q8H PRN    acetaminophen, 650 mg, Oral, Q4H PRN    albuterol-ipratropium, 3 mL, Nebulization, Q6H PRN    aluminum-magnesium hydroxide-simethicone, 30 mL, Oral, QID PRN    dextrose 50%, 12.5 g, Intravenous, PRN    dextrose 50%, 25 g, Intravenous, PRN    diphenhydrAMINE, 25 mg, Intravenous, PRN    glucagon (human recombinant), 1 mg, Intramuscular, PRN    glucose, 16 g, Oral, PRN    glucose, 24 g, Oral, PRN    HYDROcodone-acetaminophen, 1 tablet, Oral, Q6H PRN    magnesium oxide, 800 mg, Oral, PRN    magnesium oxide, 800 mg, Oral, PRN    melatonin, 6 mg, Oral, Nightly PRN    morphine, 1 mg, Intravenous, Q4H PRN    naloxone, 0.02 mg, Intravenous, PRN    ondansetron, 4 mg, Intravenous, Q6H PRN    potassium bicarbonate, 35 mEq, Oral, PRN    potassium bicarbonate, 50 mEq, Oral, PRN    potassium bicarbonate, 60 mEq, Oral, PRN    potassium, sodium phosphates, 2 packet, Oral, PRN    potassium, sodium phosphates, 2 packet, Oral, PRN    potassium, sodium phosphates, 2 packet, Oral, PRN    sodium chloride 0.9%, 2 mL, Intravenous, PRN    Pharmacy to dose Vancomycin consult, , , Once **AND** vancomycin - pharmacy to dose, , Intravenous, pharmacy to manage frequency    LABS AND DIAGNOSTICS     CBC LAST 3 DAYS  Recent Labs   Lab 10/07/24  1713 10/08/24  0513   WBC 3.29* 2.41*   RBC 3.28* 2.54*   HGB 10.1* 8.0*   HCT 33.8* 26.1*   * 103*   MCH 30.8 31.5*   MCHC 29.9* 30.7*   RDW 18.3* 18.0*    121*   MPV 9.3 9.4   GRAN 82.7*  2.7 66.9  1.6*   LYMPH 9.1*  0.3* 20.3  0.5*   MONO 6.1  0.2* 10.8  0.3   BASO 0.01 0.01   NRBC 0 0       COAGULATION LAST 3 DAYS  Recent Labs   Lab 10/07/24  1713   INR 1.1       CHEMISTRY LAST 3 DAYS  Recent  "Labs   Lab 10/07/24  1713 10/08/24  0513    137   K 4.1 3.8   CL 99 102   CO2 29 29   ANIONGAP 9 6*   BUN 28* 22   CREATININE 1.2 1.0    110   CALCIUM 8.9 7.9*   MG 1.8  --    ALBUMIN 3.0* 2.5*   PROT 6.9 5.5*   ALKPHOS 49* 38*   ALT 13 8*   AST 13 11   BILITOT 0.5 0.5       CARDIAC PROFILE LAST 3 DAYS  Recent Labs   Lab 10/07/24  1713   *       ENDOCRINE LAST 3 DAYS  Recent Labs   Lab 10/07/24  1713   TSH 0.876       LAST ARTERIAL BLOOD GAS  ABG  No results for input(s): "PH", "PO2", "PCO2", "HCO3", "BE" in the last 168 hours.    LAST 7 DAYS MICROBIOLOGY   Microbiology Results (last 7 days)       Procedure Component Value Units Date/Time    Blood culture x two cultures. Draw prior to antibiotics. [[id number]] Collected: 10/07/24 1753    Order Status: Completed Specimen: Blood from Peripheral, Hand, Left Updated: 10/08/24 0158     Blood Culture, Routine No Growth to date    Narrative:      Aerobic and anaerobic    Blood culture x two cultures. Draw prior to antibiotics. [[id number]] Collected: 10/07/24 1758    Order Status: Completed Specimen: Blood from Peripheral, Hand, Right Updated: 10/08/24 0158     Blood Culture, Routine No Growth to date    Narrative:      Aerobic and anaerobic    Respiratory Infection Panel (PCR), Nasopharyngeal [[id number]] Collected: 10/07/24 2135    Order Status: Completed Specimen: Nasopharyngeal Swab Updated: 10/07/24 2323     Respiratory Infection Panel Source NP swab     Adenovirus Not Detected     Coronavirus 229E, Common Cold Virus Not Detected     Coronavirus HKU1, Common Cold Virus Not Detected     Coronavirus NL63, Common Cold Virus Not Detected     Coronavirus OC43, Common Cold Virus Not Detected     Comment: Coronavirus strains 229E, HKU1, NL63, and OC43 can cause the common   cold   and are not associated with the respiratory disease outbreak caused   by  the COVID-19 (SARS-CoV-2 novel Coronavirus) strain.           SARS-CoV2 (COVID-19) Qualitative PCR Not " Detected     Human Metapneumovirus Not Detected     Human Rhinovirus/Enterovirus Not Detected     Influenza A (subtypes H1, H1-2009,H3) Not Detected     Influenza B Not Detected     Parainfluenza Virus 1 Not Detected     Parainfluenza Virus 2 Not Detected     Parainfluenza Virus 3 Not Detected     Parainfluenza Virus 4 Not Detected     Respiratory Syncytial Virus Not Detected     Bordetella Parapertussis (HR9319) Not Detected     Bordetella pertussis (ptxP) Not Detected     Chlamydia pneumoniae Not Detected     Mycoplasma pneumoniae Not Detected     Comment: Respiratory Infection Panel testing performed by Multiplex PCR.       Narrative:      Respiratory Infection Panel source->NP Swab            MOST RECENT IMAGING  CTA Chest Abdomen Pelvis  Narrative: EXAMINATION:  CTA CHEST ABDOMEN PELVIS    CLINICAL HISTORY:  mesenteric ischemia eval vs aortic eval;    TECHNIQUE:  Low dose axial images, sagittal and coronal reformations were obtained from the thoracic inlet through the pelvis following the IV administration of 100 mL of Omnipaque 350 .  No oral contrast was administered.    COMPARISON:  Chest radiograph 06/19/2024, CT chest abdomen pelvis 12/10/2023    FINDINGS:  Cardiomegaly.  No pericardial effusion.  No thoracic aortic aneurysm.  No acute pulmonary embolus.    No enlarged axillary or mediastinal nodes.  No enlarged hilar lymph nodes.    Loculated right pleural effusion similar to prior.  New small left pleural effusion.  Basilar predominant consolidation.  Central airways are clear.  Postsurgical changes of right lower lobectomy.  Diffuse ground-glass opacities, interlobular septal thickening and bronchial wall thickening increased compared to prior exam and now involving the left lung.  Bronchocentric consolidation in the right upper lobe.  Pulmonary emphysema.    Liver is normal in morphology and with smooth contour.  Stable right hepatic lobe cyst.    The spleen, pancreas, adrenal glands and kidneys are  normal.  No intra or extrahepatic biliary ductal dilatation.  The gallbladder is surgically absent.  Right renal cysts better evaluated on prior CT.    Abdominal aorta with severe atherosclerotic calcification..  No aortic aneurysm or dissection.  The renal arteries and mesenteric arteries are patent.  No enlarged retroperitoneal lymph nodes.    Colonic diverticula.  New mesenteric stranding, worse about the ascending colon.  No bowel dilatation.  No organized fluid collection or free air.    Bladder is smooth walled.  Uterus is absent.  No enlarged pelvic lymph nodes.    Postsurgical changes of right femur ORIF with hardware intact.  Dextroscoliosis of the lumbar spine.  Bilateral breast implants in place.  Impression: Postsurgical changes of right lower lobectomy.  Loculated right pleural effusion similar to prior.  New left pleural effusion and worsening diffuse bilateral airspace opacities interlobular septal thickening, concerning for progression of malignancy and or superimposed pneumonia.    New mesenteric stranding worse along the ascending colon.  Findings could be due to volume overload or infectious/inflammatory colitis.    Severe aortic atheromatous plaque.  No aortic aneurysm or dissection.  The renal and mesenteric arteries are patent.    This report was flagged in Epic as abnormal.    Electronically signed by: Joycelyn Taveras  Date:    10/07/2024  Time:    19:09  X-Ray Chest AP Portable  Narrative: EXAMINATION:  XR CHEST AP PORTABLE    CLINICAL HISTORY:  Sepsis;    TECHNIQUE:  Single frontal view of the chest was performed.    COMPARISON:  06/19/2024    FINDINGS:  There is a right-sided MediPort catheter in similar position.  Cardiac monitoring leads overlie the chest.  There is stable mild rightward deviation of the trachea.  Cardiac silhouette is stable in size and configuration.  There is aortic atherosclerosis.  There is a large loculated right-sided pleural effusion.  There are diffuse patchy  airspace opacities throughout the aerated right lung as well as the left lung which may reflect edema, aspiration, or multifocal infection.  No large volume of left-sided pleural fluid appreciated radiographically.  There are bilateral calcified breast prosthesis present.  No definite pneumothorax identified.  Osseous structures demonstrate degenerative changes.  Impression: Abnormal chest radiograph as above.    Electronically signed by: Virgil Gongora MD  Date:    10/07/2024  Time:    19:05      ECHOCARDIOGRAM RESULTS (last 5)  Results for orders placed during the hospital encounter of 10/13/23    Echo    Interpretation Summary    Left Ventricle: The left ventricle is normal in size. Normal wall thickness. Normal wall motion. There is normal systolic function with a visually estimated ejection fraction of 55 - 60%. Grade I diastolic dysfunction.    Right Ventricle: Normal right ventricular cavity size. Wall thickness is normal. Right ventricle wall motion  is normal. Systolic function is normal.    Aortic Valve: There is moderate aortic valve sclerosis.    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 There is mild mitral annular calcification present. There is no stenosis. The mean pressure gradient across the mitral valve is 3 mmHg at a heart rate of  bpm. There is mild regurgitation.    Tricuspid Valve: There is mild regurgitation.  No pulmonary hypertension.    IVC/SVC: Normal venous pressure at 3 mmHg.      CURRENT/PREVIOUS VISIT EKG  Results for orders placed or performed during the hospital encounter of 10/07/24   EKG 12-lead    Collection Time: 10/07/24  8:41 PM   Result Value Ref Range    QRS Duration 66 ms    [facility] QTC Calculation 453 ms    Narrative    Test Reason : R07.9,    Vent. Rate : 137 BPM     Atrial Rate : 000 BPM     P-R Int : 000 ms          QRS Dur : 066 ms      QT Int : 300 ms       P-R-T Axes : 000 009 090 degrees     QTc Int : 453 ms    Atrial fibrillation with rapid  ventricular response  Anteroseptal infarct (cited on or before 13-OCT-2023)  Abnormal ECG  When compared with ECG of 07-OCT-2024 17:03,  No significant change was found    Referred By: [email or web address]   SELF           Confirmed By:            ASSESSMENT/PLAN:     Active Hospital Problems    Diagnosis    *Bilateral pneumonia    Generalized abdominal pain    Malignant neoplasm of right lung stage 4    HTN (hypertension)    Bilateral pleural effusion    Oxygen dependent    Atrial fibrillation with RVR    ACP (advance care planning)    CKD stage 3a, GFR 45-59 ml/min       ASSESSMENT & PLAN:   CAD  Atrial fibrillation with RVR  HTN  Pneumonia  CKD  Right lung Ca stage 4      RECOMMENDATIONS:  Monitor response to IV lasix; obtain strict I/O & daily weight  Continue IV Cardizem drip for now  Meets criteria for anticoagulation: Continue Eliquis 5 mg BID  Hold Plavix and aspirin and closely monitor CBC (daily)  Echocardiogram was completed today, results are pending  TSH was normal this admission  Monitor Potassium & Magnesium daily; Follow electrolytes replacement protocol to keep K>/= 4 and magnesium >/= 2.       Erika Valdivia NP  [facility]  Department of Cardiology  Date of Service: 10/08/2024

## 2024-10-09 ENCOUNTER — ANESTHESIA (OUTPATIENT)
Dept: LONG TERM CARE ACUTE | Facility: HOSPITAL | Age: 84
End: 2024-10-09
Payer: MEDICARE

## 2024-10-09 ENCOUNTER — ANESTHESIA EVENT (OUTPATIENT)
Dept: LONG TERM CARE ACUTE | Facility: HOSPITAL | Age: 84
End: 2024-10-09
Payer: MEDICARE

## 2024-10-09 LAB
ALBUMIN SERPL BCP-MCNC: 2.8 G/DL (ref 3.5–5.2)
ALP SERPL-CCNC: 43 U/L (ref 55–135)
ALT SERPL W/O P-5'-P-CCNC: 10 U/L (ref 10–44)
ANION GAP SERPL CALC-SCNC: 9 MMOL/L (ref 8–16)
AST SERPL-CCNC: 13 U/L (ref 10–40)
BASOPHILS # BLD AUTO: 0.01 K/UL (ref 0–0.2)
BASOPHILS NFR BLD: 0.3 % (ref 0–1.9)
BILIRUB SERPL-MCNC: 0.6 MG/DL (ref 0.1–1)
BUN SERPL-MCNC: 23 MG/DL (ref 8–23)
CALCIUM SERPL-MCNC: 8.6 MG/DL (ref 8.7–10.5)
CHLORIDE SERPL-SCNC: 97 MMOL/L (ref 95–110)
CO2 SERPL-SCNC: 30 MMOL/L (ref 23–29)
CREAT SERPL-MCNC: 1.3 MG/DL (ref 0.5–1.4)
DIFFERENTIAL METHOD BLD: ABNORMAL
EOSINOPHIL # BLD AUTO: 0 K/UL (ref 0–0.5)
EOSINOPHIL NFR BLD: 0.3 % (ref 0–8)
ERYTHROCYTE [DISTWIDTH] IN BLOOD BY AUTOMATED COUNT: 17.7 % (ref 11.5–14.5)
EST. GFR  (NO RACE VARIABLE): 40.8 ML/MIN/1.73 M^2
GLUCOSE SERPL-MCNC: 136 MG/DL (ref 70–110)
HCT VFR BLD AUTO: 31.1 % (ref 37–48.5)
HGB BLD-MCNC: 9.4 G/DL (ref 12–16)
IMM GRANULOCYTES # BLD AUTO: 0.05 K/UL (ref 0–0.04)
IMM GRANULOCYTES NFR BLD AUTO: 1.7 % (ref 0–0.5)
LYMPHOCYTES # BLD AUTO: 0.4 K/UL (ref 1–4.8)
LYMPHOCYTES NFR BLD: 14.2 % (ref 18–48)
MAGNESIUM SERPL-MCNC: 1.6 MG/DL (ref 1.6–2.6)
MCH RBC QN AUTO: 30.3 PG (ref 27–31)
MCHC RBC AUTO-ENTMCNC: 30.2 G/DL (ref 32–36)
MCV RBC AUTO: 100 FL (ref 82–98)
MONOCYTES # BLD AUTO: 0.7 K/UL (ref 0.3–1)
MONOCYTES NFR BLD: 22.4 % (ref 4–15)
NEUTROPHILS # BLD AUTO: 1.8 K/UL (ref 1.8–7.7)
NEUTROPHILS NFR BLD: 61.1 % (ref 38–73)
NRBC BLD-RTO: 0 /100 WBC
PLATELET # BLD AUTO: 116 K/UL (ref 150–450)
PMV BLD AUTO: 9.7 FL (ref 9.2–12.9)
POTASSIUM SERPL-SCNC: 3.8 MMOL/L (ref 3.5–5.1)
PROT SERPL-MCNC: 6.3 G/DL (ref 6–8.4)
RBC # BLD AUTO: 3.1 M/UL (ref 4–5.4)
SODIUM SERPL-SCNC: 136 MMOL/L (ref 136–145)
WBC # BLD AUTO: 2.95 K/UL (ref 3.9–12.7)

## 2024-10-09 PROCEDURE — 27000221 HC OXYGEN, UP TO 24 HOURS

## 2024-10-09 PROCEDURE — 25000003 PHARM REV CODE 250: Performed by: NURSE PRACTITIONER

## 2024-10-09 PROCEDURE — 63600175 PHARM REV CODE 636 W HCPCS: Performed by: NURSE PRACTITIONER

## 2024-10-09 PROCEDURE — 94761 N-INVAS EAR/PLS OXIMETRY MLT: CPT

## 2024-10-09 PROCEDURE — 36415 COLL VENOUS BLD VENIPUNCTURE: CPT | Performed by: NURSE PRACTITIONER

## 2024-10-09 PROCEDURE — 99233 SBSQ HOSP IP/OBS HIGH 50: CPT | Mod: ,,, | Performed by: INTERNAL MEDICINE

## 2024-10-09 PROCEDURE — 21000000 HC CCU ICU ROOM CHARGE

## 2024-10-09 PROCEDURE — 85025 COMPLETE CBC W/AUTO DIFF WBC: CPT | Performed by: NURSE PRACTITIONER

## 2024-10-09 PROCEDURE — 36415 COLL VENOUS BLD VENIPUNCTURE: CPT | Performed by: STUDENT IN AN ORGANIZED HEALTH CARE EDUCATION/TRAINING PROGRAM

## 2024-10-09 PROCEDURE — 25000003 PHARM REV CODE 250: Performed by: HOSPITALIST

## 2024-10-09 PROCEDURE — 99232 SBSQ HOSP IP/OBS MODERATE 35: CPT | Mod: ,,, | Performed by: INTERNAL MEDICINE

## 2024-10-09 PROCEDURE — 99900035 HC TECH TIME PER 15 MIN (STAT)

## 2024-10-09 PROCEDURE — 80053 COMPREHEN METABOLIC PANEL: CPT | Performed by: NURSE PRACTITIONER

## 2024-10-09 PROCEDURE — 83735 ASSAY OF MAGNESIUM: CPT | Performed by: STUDENT IN AN ORGANIZED HEALTH CARE EDUCATION/TRAINING PROGRAM

## 2024-10-09 PROCEDURE — 99900031 HC PATIENT EDUCATION (STAT)

## 2024-10-09 RX ADMIN — DEXTROSE MONOHYDRATE 15 MG/HR: 50 INJECTION, SOLUTION INTRAVENOUS at 04:10

## 2024-10-09 RX ADMIN — MUPIROCIN 1 G: 20 OINTMENT TOPICAL at 08:10

## 2024-10-09 RX ADMIN — ACETAMINOPHEN 650 MG: 325 TABLET ORAL at 12:10

## 2024-10-09 RX ADMIN — POTASSIUM BICARBONATE 50 MEQ: 978 TABLET, EFFERVESCENT ORAL at 05:10

## 2024-10-09 RX ADMIN — Medication 400 MG: at 09:10

## 2024-10-09 RX ADMIN — AMIODARONE HYDROCHLORIDE 150 MG: 1.5 INJECTION, SOLUTION INTRAVENOUS at 11:10

## 2024-10-09 RX ADMIN — AMIODARONE HYDROCHLORIDE 1 MG/MIN: 1.8 INJECTION, SOLUTION INTRAVENOUS at 12:10

## 2024-10-09 RX ADMIN — GABAPENTIN 100 MG: 100 CAPSULE ORAL at 08:10

## 2024-10-09 RX ADMIN — APIXABAN 5 MG: 5 TABLET, FILM COATED ORAL at 08:10

## 2024-10-09 RX ADMIN — MUPIROCIN 1 G: 20 OINTMENT TOPICAL at 09:10

## 2024-10-09 RX ADMIN — DEXTROSE MONOHYDRATE 10 MG/HR: 50 INJECTION, SOLUTION INTRAVENOUS at 11:10

## 2024-10-09 RX ADMIN — Medication 800 MG: at 03:10

## 2024-10-09 RX ADMIN — DEXTROSE MONOHYDRATE 10 MG/HR: 50 INJECTION, SOLUTION INTRAVENOUS at 10:10

## 2024-10-09 RX ADMIN — GABAPENTIN 100 MG: 100 CAPSULE ORAL at 09:10

## 2024-10-09 RX ADMIN — Medication 800 MG: at 12:10

## 2024-10-09 RX ADMIN — AMIODARONE HYDROCHLORIDE 0.5 MG/MIN: 1.8 INJECTION, SOLUTION INTRAVENOUS at 05:10

## 2024-10-09 RX ADMIN — APIXABAN 5 MG: 5 TABLET, FILM COATED ORAL at 09:10

## 2024-10-09 RX ADMIN — FOLIC ACID 1 MG: 1 TABLET ORAL at 09:10

## 2024-10-09 NOTE — PROGRESS NOTES
Pulmonary/Critical Care Progress Note      PATIENT NAME: Alexa Otero  MRN: [id number]  TODAY'S DATE: 10/09/2024  9:41 AM  ADMIT DATE: 10/7/2024  AGE: 84 y.o. 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  Two weeks ago, shortly after her treatment, she developed severe abdominal pain which is precluding her from eating.  She has become very weak. She has been in paroxysmal A fib at least since  when she was supposed to follow up with her electrophysiologist but hasn't.  I had restarted her amiodarone and eliquis.  Today, she is still in A fib c RVR. Her CXR shows a new L effusion and increased ground glass markings bilaterally.  This is probably due to the AFib with RVR as opposed to a pneumonia which she has no symptoms of or progression of her disease which her markedly improved PET scan of 10/1 would argue against.  The patient occasionally produces sputum but much less now that her cancer has been treated.  She is not produced any purulent sputum.  Will try to get a sputum culture just for completeness sake.    10/9 the patient is tachycardic in the 120s.  She remains in AFib.  She is on 15 mg of Cardizem.  Her appointment with her electrophysiologist is not till .    REVIEW OF SYSTEMS  GENERAL: Feeling weak  EYES: Vision is good.  ENT: No sinusitis or pharyngitis.   HEART:  She can not feel her atrial fib  LUNGS:  She is coughing up some clear mucus  GI:  The severe abdominal pain she has had for 2 weeks has relented.  She had 1 large diarrheal stool.  She vomited everything she had eaten on .  : No dysuria, hesitancy, or nocturia.  SKIN: No lesions or rashes.  MUSCULOSKELETAL:  Her  is having to help her stand up because she was so weak.  NEURO: No headaches or neuropathy.  LYMPH: No edema or  adenopathy.  PSYCH: No anxiety or depression.  ENDO: No weight change.    No change in the patient's Past Medical History, Past Surgical History, Social History or Family History since admission.      VITAL SIGNS (MOST RECENT)  Temp: 98 °F (36.7 °C) (10/09/24 0300)  Pulse: 96 (10/09/24 0746)  Resp: 17 (10/09/24 0746)  BP: 105/60 (10/09/24 0701)  SpO2: 100 % (10/09/24 0746)    INTAKE AND OUTPUT (LAST 24 HOURS):  Intake/Output Summary (Last 24 hours) at 10/9/2024 0848  Last data filed at 10/9/2024 0656  Gross per 24 hour   Intake 560 ml   Output 500 ml   Net 60 ml       WEIGHT  Wt Readings from Last 1 Encounters:   10/09/24 54.9 kg (121 lb 0.5 oz)       PHYSICAL EXAM  GENERAL: Older patient in no distress.  HEENT: Pupils equal and reactive. Extraocular movements intact. Nose intact. Pharynx moist.  NECK: Supple.   HEART:  Irregularly irregular and tachycardic rate and rhythm. No murmur or gallop auscultated.  LUNGS:  The right lung has consolidated breath sounds and decreased breath sounds throughout.  There are much fewer crackles in the left base.  Lung excursion symmetrical. No change in fremitus.   ABDOMEN: Bowel sounds present. Non-tender, no masses palpated.  : Normal anatomy.  EXTREMITIES: Normal muscle tone and joint movement, no cyanosis or clubbing.   LYMPHATICS: No adenopathy palpated, no edema.  SKIN: Dry, intact, no lesions.   NEURO: Cranial nerves II-XII intact. Motor strength 5/5 bilaterally, upper and lower extremities.  PSYCH: Appropriate affect      CBC LAST (LAST 24 HOURS)  Recent Labs   Lab 10/09/24  0346   WBC 2.95*   RBC 3.10*   HGB 9.4*   HCT 31.1*   *   MCH 30.3   MCHC 30.2*   RDW 17.7*   *   MPV 9.7   GRAN 61.1  1.8   LYMPH 14.2*  0.4*   MONO 22.4*  0.7   BASO 0.01   NRBC 0       CHEMISTRY LAST (LAST 24 HOURS)  Recent Labs   Lab 10/09/24  0346      K 3.8   CL 97   CO2 30*   ANIONGAP 9   BUN 23   CREATININE 1.3   *   CALCIUM 8.6*   ALBUMIN 2.8*   PROT 6.3    ALKPHOS 43*   ALT 10   AST 13   BILITOT 0.6         CARDIAC PROFILE (LAST 24 HOURS)  Recent Labs   Lab 10/07/24  1713   *   TROPONINIHS 13.3       LAST 7 DAYS MICROBIOLOGY   Microbiology Results (last 7 days)       Procedure Component Value Units Date/Time    Blood culture x two cultures. Draw prior to antibiotics. [[id number]] Collected: 10/07/24 1753    Order Status: Completed Specimen: Blood from Peripheral, Hand, Left Updated: 10/08/24 2032     Blood Culture, Routine No Growth to date      No Growth to date    Narrative:      Aerobic and anaerobic    Blood culture x two cultures. Draw prior to antibiotics. [[id number]] Collected: 10/07/24 1758    Order Status: Completed Specimen: Blood from Peripheral, Hand, Right Updated: 10/08/24 2032     Blood Culture, Routine No Growth to date      No Growth to date    Narrative:      Aerobic and anaerobic    Respiratory Infection Panel (PCR), Nasopharyngeal [[id number]] Collected: 10/07/24 2135    Order Status: Completed Specimen: Nasopharyngeal Swab Updated: 10/07/24 2323     Respiratory Infection Panel Source NP swab     Adenovirus Not Detected     Coronavirus 229E, Common Cold Virus Not Detected     Coronavirus HKU1, Common Cold Virus Not Detected     Coronavirus NL63, Common Cold Virus Not Detected     Coronavirus OC43, Common Cold Virus Not Detected     Comment: Coronavirus strains 229E, HKU1, NL63, and OC43 can cause the common   cold   and are not associated with the respiratory disease outbreak caused   by  the COVID-19 (SARS-CoV-2 novel Coronavirus) strain.           SARS-CoV2 (COVID-19) Qualitative PCR Not Detected     Human Metapneumovirus Not Detected     Human Rhinovirus/Enterovirus Not Detected     Influenza A (subtypes H1, H1-2009,H3) Not Detected     Influenza B Not Detected     Parainfluenza Virus 1 Not Detected     Parainfluenza Virus 2 Not Detected     Parainfluenza Virus 3 Not Detected     Parainfluenza Virus 4 Not Detected     Respiratory  Syncytial Virus Not Detected     Bordetella Parapertussis (WA4523) Not Detected     Bordetella pertussis (ptxP) Not Detected     Chlamydia pneumoniae Not Detected     Mycoplasma pneumoniae Not Detected     Comment: Respiratory Infection Panel testing performed by Multiplex PCR.       Narrative:      Respiratory Infection Panel source->NP Swab            MOST RECENT IMAGING  Echo    Left Ventricle: The left ventricle is normal in size. Mildly increased   wall thickness. There is mild concentric hypertrophy. Moderate global   hypokinesis present. There is mildly reduced systolic function with a   visually estimated ejection fraction of 40 - 45%. There is normal   diastolic function.    Right Ventricle: Normal right ventricular cavity size. Wall thickness   is normal. Systolic function is normal.    Left Atrium: Left atrium is moderately dilated.    Mitral Valve: There is bileaflet sclerosis. There is moderate mitral   annular calcification present. There is moderate stenosis. The mean   pressure gradient across the mitral valve is 8 mmHg at a heart rate of    bpm. There is mild regurgitation with a centrally directed jet.    Pulmonary Artery: The estimated pulmonary artery systolic pressure is   29 mmHg.    IVC/SVC: Normal venous pressure at 3 mmHg.      CURRENT VISIT EKG  Results for orders placed or performed during the hospital encounter of 10/07/24   EKG 12-lead   Result Value Ref Range    QRS Duration 66 ms    [facility] QTC Calculation 453 ms    Narrative    Test Reason : R07.9,    Vent. Rate : 137 BPM     Atrial Rate : 000 BPM     P-R Int : 000 ms          QRS Dur : 066 ms      QT Int : 300 ms       P-R-T Axes : 000 009 090 degrees     QTc Int : 453 ms    Atrial fibrillation with rapid ventricular response  Anteroseptal infarct (cited on or before 13-OCT-2023)  Abnormal ECG  When compared with ECG of 07-OCT-2024 17:03,  No significant change was found    Referred By: [email or web address]   SELF           Confirmed By:         ECHOCARDIOGRAM RESULTS  Results for orders placed during the hospital encounter of 10/13/23    Echo    Interpretation Summary    Left Ventricle: The left ventricle is normal in size. Normal wall thickness. Normal wall motion. There is normal systolic function with a visually estimated ejection fraction of 55 - 60%. Grade I diastolic dysfunction.    Right Ventricle: Normal right ventricular cavity size. Wall thickness is normal. Right ventricle wall motion  is normal. Systolic function is normal.    Aortic Valve: There is moderate aortic valve sclerosis.    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 There is mild mitral annular calcification present. There is no stenosis. The mean pressure gradient across the mitral valve is 3 mmHg at a heart rate of  bpm. There is mild regurgitation.    Tricuspid Valve: There is mild regurgitation.  No pulmonary hypertension.    IVC/SVC: Normal venous pressure at 3 mmHg.        Oxygen  INFORMATION     1 L, the patient wears 2 at home         IMPRESSION AND PLAN  Mild pulmonary edema with new left pleural effusion  - secondary to AFib and moderate malnutrition  -   - Lasix  - do not see a need for antibiotics  - CXR in am  Paroxysmal atrial fib  - present since at least 9 19  - patient was supposed to follow up with Dr. Smith, but has not, her appointment is 11/1  - patient treated with amiodarone in the past from her electrophysiologist, so that is what I restarted  - continue Eliquis  - maxed on Cardizem  Moderate malnutrition, moderate hypoalbuminemia  - has not been eating secondary to abdominal pain  Weakness  Lepidic pattern adenocarcinoma  - on Alimta  - improved PET scan  Anemia  - a bit better  Leukopenia  - ANC adequate  Thrombocytopenia  - trend    Neva Christian MD  Date of Service: 10/09/2024  9:41 AM

## 2024-10-09 NOTE — PLAN OF CARE
Problem: Adult Inpatient Plan of Care  Goal: Plan of Care Review  Outcome: Progressing  Goal: Patient-Specific Goal (Individualized)  Outcome: Progressing  Goal: Absence of Hospital-Acquired Illness or Injury  Outcome: Progressing  Goal: Optimal Comfort and Wellbeing  Outcome: Progressing  Goal: Readiness for Transition of Care  Outcome: Progressing     Problem: Pneumonia  Goal: Fluid Balance  Outcome: Progressing  Goal: Resolution of Infection Signs and Symptoms  Outcome: Progressing  Goal: Effective Oxygenation and Ventilation  Outcome: Progressing     Problem: Infection  Goal: Absence of Infection Signs and Symptoms  Outcome: Progressing     Problem: Fall Injury Risk  Goal: Absence of Fall and Fall-Related Injury  Outcome: Progressing     Problem: Skin Injury Risk Increased  Goal: Skin Health and Integrity  Outcome: Progressing     Problem: Oral Intake Inadequate  Goal: Improved Oral Intake  Outcome: Progressing

## 2024-10-09 NOTE — ASSESSMENT & PLAN NOTE
Patient has paroxysmal (<7 days) atrial fibrillation. Patient is currently in atrial fibrillation. [id number] Score: 5. The patients heart rate in the last 24 hours is as follows:  Pulse  Min: 91  Max: 122     Antiarrhythmics  diltiaZEM 100 mg in dextrose 5% 100 mL IVPB (ready to mix) (titrating), Continuous, Intravenous  amiodarone 360 mg/200 mL (1.8 mg/mL) infusion, Continuous, Intravenous  amiodarone 360 mg/200 mL (1.8 mg/mL) infusion, Continuous, Intravenous    Anticoagulants  apixaban tablet 5 mg, 2 times daily, Oral    Plan  - Replete lytes with a goal of K>4, Mg >2  - Patient is anticoagulated, see medications listed above.  - still requiring diltiazem drip, seen by Cardiology and added loading dose of amiodarone and continue to wean drip.  Plan for VASILIY/CV 10/10.

## 2024-10-09 NOTE — PROGRESS NOTES
[facility]  Progress Note    Patient Name: Alexa Otero  MRN: [id number]  Patient Class: IP- Inpatient   Admission Date: 10/7/2024  Length of Stay: 2 days  Attending Physician: Karine Goodrich DO  Primary Care Provider: Idalia Greco MD        Subjective:     Principal Problem:Bilateral pneumonia        HPI:  84-year-old female who presented to the ED from outpatient clinic via EMS with reports of abdominal pain, N/V/D x3 days found to be in atrial fib with RVR.  Patient reports that she has stage IV lung cancer and went to her clinic for follow up.  She states that she was having and has had severe abdominal pain with NVD since her last chemotherapy.  Patient denies any feelings of chest palpitations.  Had discussion with patient regarding being placed on a ventilator or having chest compressions. She stated that she did not want to have those things done and she was made a DNR in the system.     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  Loculated right pleural effusion similar to prior.  New left pleural effusion and worsening diffuse bilateral airspace opacities interlobular septal thickening concerning for progression of malignancy or superimposed pneumonia.  New mesenteric stranding worse along the ascending colon.  Could be volume overload or infectious/inflammatory colitis.  Severe aortic atheromatous plaque.  Blood work performed in ED BUN 28, WBCs 3.29, hemoglobin 10.1, hematocrit 33.8, platelets 171, .  Initial troponin 13.3    Overview/Hospital Course:  The patient was admitted and placed on a cardizem drip. Echo and cardiology consult were ordered. Her home beta blocker was restarted. She was seen by pulmonology, who did not feel patient has pneumonia, and stopped abx. Pulmonology felt pleural effusion was due to afib with RVR. One dose of IV lasix was given. Her kidney function was monitored.  Seen by Cardiology and loading dose of  amiodarone started in addition to weaning diltiazem drip.  Plan for VASILIY/CV 10/10.  Kidney function is improving.    Interval History: Patient states she feels a little better.    Review of Systems   Constitutional:  Negative for activity change, appetite change, chills and fever.   HENT:  Negative for congestion, ear pain, nosebleeds and sinus pain.    Eyes:  Negative for discharge and itching.   Respiratory:  Negative for apnea, cough, chest tightness and shortness of breath.    Cardiovascular:  Negative for chest pain, palpitations and leg swelling.   Gastrointestinal:  Positive for abdominal pain. Negative for abdominal distention, diarrhea, nausea and vomiting.   Genitourinary:  Negative for difficulty urinating, dysuria, flank pain and frequency.   Musculoskeletal:  Negative for arthralgias, back pain, joint swelling and myalgias.   Skin:  Negative for color change, pallor and rash.   Neurological:  Negative for dizziness, weakness, light-headedness and headaches.   Psychiatric/Behavioral:  Negative for agitation, behavioral problems, confusion and suicidal ideas.      Objective:     Vital Signs (Most Recent):  Temp: 97.5 °F (36.4 °C) (10/09/24 0706)  Pulse: 91 (10/09/24 1500)  Resp: 17 (10/09/24 1500)  BP: (!) 103/59 (10/09/24 1500)  SpO2: 100 % (10/09/24 1500) Vital Signs (24h Range):  Temp:  [97.5 °F (36.4 °C)-98.1 °F (36.7 °C)] 97.5 °F (36.4 °C)  Pulse:  [] 91  Resp:  [11-23] 17  SpO2:  [95 %-100 %] 100 %  BP: ()/(51-79) 103/59     Weight: 54.9 kg (121 lb 0.5 oz)  Body mass index is 20.78 kg/m².    Intake/Output Summary (Last 24 hours) at 10/9/2024 1530  Last data filed at 10/9/2024 1203  Gross per 24 hour   Intake 818 ml   Output 650 ml   Net 168 ml         Physical Exam  Vitals reviewed.   Constitutional:       General: She is not in acute distress.     Appearance: Normal appearance. She is normal weight. She is not ill-appearing.      Comments: Cachectic   HENT:      Head: Normocephalic and  atraumatic.      Nose: Nose normal.      Mouth/Throat:      Mouth: Mucous membranes are moist.      Pharynx: Oropharynx is clear.   Eyes:      General: No scleral icterus.     Extraocular Movements: Extraocular movements intact.      Conjunctiva/sclera: Conjunctivae normal.      Pupils: Pupils are equal, round, and reactive to light.   Cardiovascular:      Rate and Rhythm: Tachycardia present. Rhythm irregular.      Pulses: Normal pulses.      Heart sounds: Normal heart sounds. No murmur heard.     No gallop.   Pulmonary:      Effort: Pulmonary effort is normal. No respiratory distress.      Breath sounds: No wheezing, rhonchi or rales.      Comments: Decreased air movement bases  Chest:      Chest wall: No tenderness.   Abdominal:      General: Abdomen is flat. There is no distension.      Palpations: Abdomen is soft.      Tenderness: There is abdominal tenderness.   Musculoskeletal:         General: No swelling or tenderness.      Cervical back: Normal range of motion and neck supple. No rigidity or tenderness.      Right lower leg: No edema.      Left lower leg: No edema.   Skin:     General: Skin is warm and dry.   Neurological:      General: No focal deficit present.      Mental Status: She is alert and oriented to person, place, and time. Mental status is at baseline.      Motor: No weakness.   Psychiatric:         Mood and Affect: Mood normal.         Behavior: Behavior normal.         Thought Content: Thought content normal.             Significant Labs: All pertinent labs within the past 24 hours have been reviewed.  Recent Lab Results         10/09/24  0346   10/09/24  0345   10/08/24  2100        Albumin 2.8           ALP 43           ALT 10           Anion Gap 9           AST 13           Baso # 0.01           Basophil % 0.3           BILIRUBIN TOTAL 0.6  Comment: For infants and newborns, interpretation of results should be based  on gestational age, weight and in agreement with  clinical  observations.    Premature Infant recommended reference ranges:  Up to 24 hours.............<8.0 mg/dL  Up to 48 hours............<12.0 mg/dL  3-5 days..................<15.0 mg/dL  6-29 days.................<15.0 mg/dL             BUN 23           Calcium 8.6           Chloride 97           CO2 30           Creatinine 1.3           Differential Method Automated           eGFR 40.8           Eos # 0.0           Eos % 0.3           Glucose 136           Gran # (ANC) 1.8           Gran % 61.1           Hematocrit 31.1           Hemoglobin 9.4           Immature Grans (Abs) 0.05  Comment: Mild elevation in immature granulocytes is non specific and   can be seen in a variety of conditions including stress response,   acute inflammation, trauma and pregnancy. Correlation with other   laboratory and clinical findings is essential.             Immature Granulocytes 1.7           Lymph # 0.4           Lymph % 14.2           Magnesium    1.6         MCH 30.3           MCHC 30.2                      Mono # 0.7           Mono % 22.4           MPV 9.7           nRBC 0           Platelet Count 116           Potassium 3.8           PROTEIN TOTAL 6.3           RBC 3.10           RDW 17.7           Sodium 136           Vancomycin, Random     6.6       WBC 2.95                   Significant Imaging: I have reviewed all pertinent imaging results/findings within the past 24 hours.  Imaging Results              X-Ray Chest AP Portable (Final result)  Result time 10/07/24 19:05:03      Final result by Virgil Gongora MD (10/07/24 19:05:03)                   Impression:      Abnormal chest radiograph as above.      Electronically signed by: Virgil Gongora MD  Date:    10/07/2024  Time:    19:05               Narrative:    EXAMINATION:  XR CHEST AP PORTABLE    CLINICAL HISTORY:  Sepsis;    TECHNIQUE:  Single frontal view of the chest was performed.    COMPARISON:  06/19/2024    FINDINGS:  There is a right-sided  MediPort catheter in similar position.  Cardiac monitoring leads overlie the chest.  There is stable mild rightward deviation of the trachea.  Cardiac silhouette is stable in size and configuration.  There is aortic atherosclerosis.  There is a large loculated right-sided pleural effusion.  There are diffuse patchy airspace opacities throughout the aerated right lung as well as the left lung which may reflect edema, aspiration, or multifocal infection.  No large volume of left-sided pleural fluid appreciated radiographically.  There are bilateral calcified breast prosthesis present.  No definite pneumothorax identified.  Osseous structures demonstrate degenerative changes.                                        CTA Chest Abdomen Pelvis (Final result)  Result time 10/07/24 19:09:26      Final result by Joycelyn Taveras MD (10/07/24 19:09:26)                   Impression:      Postsurgical changes of right lower lobectomy.  Loculated right pleural effusion similar to prior.  New left pleural effusion and worsening diffuse bilateral airspace opacities interlobular septal thickening, concerning for progression of malignancy and or superimposed pneumonia.    New mesenteric stranding worse along the ascending colon.  Findings could be due to volume overload or infectious/inflammatory colitis.    Severe aortic atheromatous plaque.  No aortic aneurysm or dissection.  The renal and mesenteric arteries are patent.    This report was flagged in Epic as abnormal.      Electronically signed by: Joycelyn Taveras  Date:    10/07/2024  Time:    19:09               Narrative:    EXAMINATION:  CTA CHEST ABDOMEN PELVIS    CLINICAL HISTORY:  mesenteric ischemia eval vs aortic eval;    TECHNIQUE:  Low dose axial images, sagittal and coronal reformations were obtained from the thoracic inlet through the pelvis following the IV administration of 100 mL of Omnipaque 350 .  No oral contrast was administered.    COMPARISON:  Chest radiograph  06/19/2024, CT chest abdomen pelvis 12/10/2023    FINDINGS:  Cardiomegaly.  No pericardial effusion.  No thoracic aortic aneurysm.  No acute pulmonary embolus.    No enlarged axillary or mediastinal nodes.  No enlarged hilar lymph nodes.    Loculated right pleural effusion similar to prior.  New small left pleural effusion.  Basilar predominant consolidation.  Central airways are clear.  Postsurgical changes of right lower lobectomy.  Diffuse ground-glass opacities, interlobular septal thickening and bronchial wall thickening increased compared to prior exam and now involving the left lung.  Bronchocentric consolidation in the right upper lobe.  Pulmonary emphysema.    Liver is normal in morphology and with smooth contour.  Stable right hepatic lobe cyst.    The spleen, pancreas, adrenal glands and kidneys are normal.  No intra or extrahepatic biliary ductal dilatation.  The gallbladder is surgically absent.  Right renal cysts better evaluated on prior CT.    Abdominal aorta with severe atherosclerotic calcification..  No aortic aneurysm or dissection.  The renal arteries and mesenteric arteries are patent.  No enlarged retroperitoneal lymph nodes.    Colonic diverticula.  New mesenteric stranding, worse about the ascending colon.  No bowel dilatation.  No organized fluid collection or free air.    Bladder is smooth walled.  Uterus is absent.  No enlarged pelvic lymph nodes.    Postsurgical changes of right femur ORIF with hardware intact.  Dextroscoliosis of the lumbar spine.  Bilateral breast implants in place.                                        Assessment/Plan:      * Bilateral pneumonia  Ruled out by pulmonology      Malignant neoplasm of right lung stage 4  Noted         Generalized abdominal pain  With c/o N/V/D since her last treatment  Zofran PRN   No diarrhea currently  Reports slightly better at this time  Morphine 1 mg IV q4h prn     HTN (hypertension)  Patients blood pressure range in the last 24  hours was: BP  Min: 124/70  Max: 152/89.The patient's inpatient anti-hypertensive regimen is listed below:  Current Antihypertensives  lisinopriL tablet 40 mg, Daily, Oral  metoprolol succinate (TOPROL-XL) 24 hr tablet 25 mg, Daily, Oral  diltiaZEM injection 13.5 mg, Once, Intravenous  diltiaZEM 100 mg in dextrose 5% 100 mL IVPB (ready to mix) (titrating), Continuous, Intravenous    Plan  - BP is uncontrolled, will adjust as follows:  See list above holding lisinopril and metoprolol today we will restart p.o. metoprolol in a.m. patient placed on Cardizem drip    Bilateral pleural effusion  Supplemental oxygen     May improve with conversion of atrial fibrillation   One dose of IV lasix given    Oxygen dependent  Continue oxygen as needed      Atrial fibrillation with RVR  Patient has paroxysmal (<7 days) atrial fibrillation. Patient is currently in atrial fibrillation. [id number] Score: 5. The patients heart rate in the last 24 hours is as follows:  Pulse  Min: 91  Max: 122     Antiarrhythmics  diltiaZEM 100 mg in dextrose 5% 100 mL IVPB (ready to mix) (titrating), Continuous, Intravenous  amiodarone 360 mg/200 mL (1.8 mg/mL) infusion, Continuous, Intravenous  amiodarone 360 mg/200 mL (1.8 mg/mL) infusion, Continuous, Intravenous    Anticoagulants  apixaban tablet 5 mg, 2 times daily, Oral    Plan  - Replete lytes with a goal of K>4, Mg >2  - Patient is anticoagulated, see medications listed above.  - still requiring diltiazem drip, seen by Cardiology and added loading dose of amiodarone and continue to wean drip.  Plan for VASILIY/CV 10/10.        ACP (advance care planning)  Discussed code status with the patient she does not wish to be on the ventilator or have chest compressions.  She was changed to a DNR      CKD stage 3a, GFR 45-59 ml/min  Creatine stable for now. BMP reviewed- noted Estimated Creatinine Clearance: 30.7 mL/min (based on SCr of 1.2 mg/dL). according to latest data. Based on current GFR, CKD stage  is stage 3 - GFR 30-59.  Monitor UOP and serial BMP and adjust therapy as needed. Renally dose meds. Avoid nephrotoxic medications and procedures.     Most Recent Today 11 d ago 12 d ago 2 wk ago 3 wk ago 1 mo ago 1 mo ago   CHEM PROFILE   BUN 28 High  (Today) 28 High  20 23 28 High  30 High  21 24 High    Creatinine 1.2 (Today) 1.2 1.2 1.3 1.3 1.2 1.1 1.1   eGFR if non  35.3 Abnormal   (2 yr ago)          eGFR if African American 40.6 Abnormal  (2 yr ago)                VTE Risk Mitigation (From admission, onward)           Ordered     apixaban tablet 5 mg  2 times daily         10/07/24 2029     Reason for No Pharmacological VTE Prophylaxis  Once        Question:  Reasons:  Answer:  Already adequately anticoagulated on oral Anticoagulants    10/07/24 2029     IP VTE HIGH RISK PATIENT  Once         10/07/24 2029     Place sequential compression device  Until discontinued         10/07/24 2029                    Discharge Planning   ALYCIA:      Code Status: DNR   Is the patient medically ready for discharge?:     Reason for patient still in hospital (select all that apply): Treatment, Consult recommendations, and Pending disposition  Discharge Plan A: Home with family                  Karine Goodrich DO  Department of Hospital Medicine   [facility]

## 2024-10-09 NOTE — PROGRESS NOTES
"[facility]  Adult Nutrition   Progress Note (Initial Assessment)     SUMMARY     Recommendations  1) RD recommends to change pts diet to a regular and add Ensure HP with meals d/t pt with lung cancer.   2)  to obtain/honor pts daily food selections.    Goals:   Pt to meet 75% or more of her EEN/EPN by follow up.    Nutrition Goal Status: goal not met    Communication of RD Recs: other (comment)    Nutrition Diagnosis PES Statement: Inadequate oral intake related to catabolism energy increases as evidenced by cancer pt with bilateral pneumonia.     Dietitian Rounds Brief  MST 3: Pt is an 84 yowf admitted with bilateral pneumonia with lepidic adenocarcinoma who has been under treatment with Alimta. Two weeks ago, shortly after her treatment, she developed severe abdominal pain which is precluding her from eating. She has become very weak. She has a PMH of HTN, MD, GERD, HLD, SCC, PONV, extra-ovarian endometrial adenocarcinoma, DMll, colon polyp cardiac arrest, CAD and COPD.     She is still not eating very well which is why her diet was changed to a regular with Ensure HP TID to assist in meeting her nutritional needs. Her skin appears to be intact and her LBM was on 10/7. Her labs have been reviewed and will follow biweekly and prn.    Nutrition Related Social Determinants of Health: SDOH: None Identified    Diet order:   Current Diet Order: Cardiac/1200 mL FR      Evaluation of Received Nutrient/Fluid Intake  Energy Calories Required: not meeting needs  Protein Required: not meeting needs  Fluid Required: not meeting needs  Tolerance: not tolerating     % Intake of Estimated Energy Needs: 0%  % Meal Intake: 0 - 25 %      Intake/Output Summary (Last 24 hours) at 10/9/2024 1025  Last data filed at 10/9/2024 0913  Gross per 24 hour   Intake 578 ml   Output 650 ml   Net -72 ml        Anthropometrics  Temp: 97.5 °F (36.4 °C)  Height Method: Stated  Height: 5' 4" (162.6 cm)  Height (inches): 64 " in  Weight Method: Bed Scale  Weight: 54.9 kg (121 lb 0.5 oz)  Weight (lb): 121.03 lb  Ideal Body Weight (IBW), Female: 120 lb  BMI (Calculated): 20.8  BMI Grade: 18.5-24.9 - normal       Estimated/Assessed Needs  Weight Used For Calorie Calculations: 54.9 kg (121 lb 0.5 oz)  Energy Calorie Requirements (kcal): 6926-2011 kcals/day (25-30 kcals/kg ABW)  Energy Need Method: Kcal/kg  Protein Requirements:  g/day (1.5-2.0 g/kg ABW)  Weight Used For Protein Calculations: 54.9 kg (121 lb 0.5 oz)     Estimated Fluid Requirement Method: RDA Method  RDA Method (mL): 1647       Reason for Assessment  Reason For Assessment: identified at risk by screening criteria (MST 3)  Diagnosis: other (see comments) (Bilateral pneumonia)  General Information Comments: PMH: Hypertension, Arthritis, Cataract, Macular degeneration, GERD (gastroesophageal reflux disease), Encounter for blood transfusion, Hyperlipidemia, 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, PONV (postoperative nausea and vomiting), Extra-ovarian endometrioid adenocarcinoma, Diabetes mellitus type II, Colon polyp, History of chronic cough - clear productive, Ovarian cancer, Cardiac arrest, Coronary artery disease, Chronic obstructive pulmonary disease, unspecified COPD type  Nutrition Discharge Planning: Cardiac/1200 mL FR diet with Ensure HP prn    Nutrition/Diet History  Spiritual, Cultural Beliefs, Faith Practices, Values that Affect Care: no  Food Allergies: NKFA  Factors Affecting Nutritional Intake: abdominal pain, nausea/vomiting, diarrhea    Nutrition Risk Screen  Nutrition Risk Screen: no indicators present     MST Score: 3  Have you recently lost weight without trying?: Unsure  Weight loss score: 2  Have you been eating poorly because of a decreased appetite?: Yes  Appetite score: 1       Weight History:  Wt Readings from Last 10 Encounters:   10/09/24 54.9 kg (121 lb 0.5 oz)   10/07/24 54.1 kg (119 lb 4.3 oz)   10/02/24 56.6 kg (124 lb 11.2  oz)   10/01/24 54 kg (119 lb)   09/30/24 55.1 kg (121 lb 6.4 oz)   09/27/24 54 kg (119 lb)   09/19/24 54 kg (119 lb)   09/11/24 54.9 kg (121 lb)   09/09/24 53.9 kg (118 lb 12.8 oz)   08/21/24 53.8 kg (118 lb 8 oz)        Lab/Procedures/Meds: Pertinent Labs/Meds Reviewed    Medications:Pertinent Medications Reviewed  Scheduled Meds:   apixaban  5 mg Oral BID    folic acid  1 mg Oral Daily    gabapentin  100 mg Oral BID    magnesium oxide  400 mg Oral Daily    mupirocin   Nasal BID     Continuous Infusions:   dilTIAZem  0-15 mg/hr Intravenous Continuous 15 mL/hr at 10/09/24 0403 15 mg/hr at 10/09/24 0403     PRN Meds:.  Current Facility-Administered Medications:     acetaminophen, 650 mg, Oral, Q8H PRN    acetaminophen, 650 mg, Oral, Q4H PRN    albuterol-ipratropium, 3 mL, Nebulization, Q6H PRN    aluminum-magnesium hydroxide-simethicone, 30 mL, Oral, QID PRN    dextrose 50%, 12.5 g, Intravenous, PRN    dextrose 50%, 25 g, Intravenous, PRN    glucagon (human recombinant), 1 mg, Intramuscular, PRN    glucose, 16 g, Oral, PRN    glucose, 24 g, Oral, PRN    HYDROcodone-acetaminophen, 1 tablet, Oral, Q6H PRN    magnesium oxide, 800 mg, Oral, PRN    magnesium oxide, 800 mg, Oral, PRN    melatonin, 6 mg, Oral, Nightly PRN    morphine, 1 mg, Intravenous, Q4H PRN    naloxone, 0.02 mg, Intravenous, PRN    ondansetron, 4 mg, Intravenous, Q6H PRN    potassium bicarbonate, 35 mEq, Oral, PRN    potassium bicarbonate, 50 mEq, Oral, PRN    potassium bicarbonate, 60 mEq, Oral, PRN    potassium, sodium phosphates, 2 packet, Oral, PRN    potassium, sodium phosphates, 2 packet, Oral, PRN    potassium, sodium phosphates, 2 packet, Oral, PRN    sodium chloride 0.9%, 2 mL, Intravenous, PRN    Labs: Pertinent Labs Reviewed  Clinical Chemistry:  Recent Labs   Lab 10/07/24  1713 10/08/24  0513 10/09/24  0346    137 136   K 4.1 3.8 3.8   CL 99 102 97   CO2 29 29 30*    110 136*   BUN 28* 22 23   CREATININE 1.2 1.0 1.3   CALCIUM  8.9 7.9* 8.6*   PROT 6.9 5.5* 6.3   ALBUMIN 3.0* 2.5* 2.8*   BILITOT 0.5 0.5 0.6   ALKPHOS 49* 38* 43*   AST 13 11 13   ALT 13 8* 10   ANIONGAP 9 6* 9   MG 1.8  --   --    PHOS 3.2  --   --    LIPASE 40  --   --      CBC:   Recent Labs   Lab 10/09/24  0346   WBC 2.95*   RBC 3.10*   HGB 9.4*   HCT 31.1*   *   *   MCH 30.3   MCHC 30.2*       Cardiac Profile:  Recent Labs   Lab 10/07/24  1713   *     Thyroid & Parathyroid:  Recent Labs   Lab 10/07/24  1713   TSH 0.876       Monitor and Evaluation  Food and Nutrient Intake: food and beverage intake, energy intake  Food and Nutrient Adminstration: diet order  Knowledge/Beliefs/Attitudes: food and nutrition knowledge/skill, beliefs and attitudes  Physical Activity and Function: nutrition-related ADLs and IADLs, factors affecting access to physical activity  Anthropometric Measurements: weight, weight change, body mass index  Biochemical Data, Medical Tests and Procedures: lipid profile, inflammatory profile, glucose/endocrine profile, gastrointestinal profile, electrolyte and renal panel  Nutrition-Focused Physical Findings: overall appearance     Nutrition Risk  Level of Risk/Frequency of Follow-up: high     Nutrition Follow-Up  RD Follow-up?: Yes

## 2024-10-09 NOTE — CARE UPDATE
10/08/24 2009   Patient Assessment/Suction   Level of Consciousness (AVPU) alert   Respiratory Effort Normal   Expansion/Accessory Muscles/Retractions no use of accessory muscles   PRE-TX-O2   Device (Oxygen Therapy) nasal cannula   $ Is the patient on Low Flow Oxygen? Yes   Flow (L/min) (Oxygen Therapy) 2   SpO2 100 %   Pulse Oximetry Type Continuous   $ Pulse Oximetry - Multiple Charge Pulse Oximetry - Multiple   Pulse (!) 118   Resp (!) 21   BP (!) 130/59   Aerosol Therapy   $ Aerosol Therapy Charges PRN treatment not required   Education   $ Education Bronchodilator;15 min;Oxygen   Tobacco Cessation Intervention   Do you use any type of tobacco product? No   $ Smoking Cessation Charges Smoking Cessation - Intermediate (Non-CTTS)   Respiratory Evaluation   $ Care Plan Tech Time 15 min   $ Respiratory Evaluation Complete   Evaluation For New Orders   Admitting Diagnosis BILAT PNA   Cardiac Diagnosis AFIB/RVR   Pulmonary Diagnosis COPD, PULM EDEMA W/ LT EFFUSION   Home Oxygen   Has Home Oxygen? Yes   Liter Flow 2   Duration continuous   Route nasal cannula   Mode continuous   Home Aerosol, MDI, DPI, and Other Treatments/Therapies   Home Respiratory Therapy Per Patient/Review of Chart No   Oxygen Care Plan   Oxygen Care Plan Per Protocol   Rationale Maintain Home oxygen   Bronchodilator Care Plan   Bronchodilator Care Plan Aerosol   Aerosol Meds w/ frequency Albuterol - Ipratropium (DUO-NEB) 0.5mg-3mg(2.5ml) 3ml Nebulizer Solution2.5mg PRN   Rationale Bronchitis/COPD   Atelectasis Care Plan   Atelectasis Care Plan Incentive Spiromentry   Frequency PRN   I.S. Goal (ml) 1000 ml   I.S. Minimum (ml) 760 ml   Rationale Diminished   Airway Clearance Care Plan   Airway Clearance Care Plan Other   Rationale No rationale found

## 2024-10-09 NOTE — SUBJECTIVE & OBJECTIVE
Interval History: Patient states she feels a little better.    Review of Systems   Constitutional:  Negative for activity change, appetite change, chills and fever.   HENT:  Negative for congestion, ear pain, nosebleeds and sinus pain.    Eyes:  Negative for discharge and itching.   Respiratory:  Negative for apnea, cough, chest tightness and shortness of breath.    Cardiovascular:  Negative for chest pain, palpitations and leg swelling.   Gastrointestinal:  Positive for abdominal pain. Negative for abdominal distention, diarrhea, nausea and vomiting.   Genitourinary:  Negative for difficulty urinating, dysuria, flank pain and frequency.   Musculoskeletal:  Negative for arthralgias, back pain, joint swelling and myalgias.   Skin:  Negative for color change, pallor and rash.   Neurological:  Negative for dizziness, weakness, light-headedness and headaches.   Psychiatric/Behavioral:  Negative for agitation, behavioral problems, confusion and suicidal ideas.      Objective:     Vital Signs (Most Recent):  Temp: 97.5 °F (36.4 °C) (10/09/24 0706)  Pulse: 91 (10/09/24 1500)  Resp: 17 (10/09/24 1500)  BP: (!) 103/59 (10/09/24 1500)  SpO2: 100 % (10/09/24 1500) Vital Signs (24h Range):  Temp:  [97.5 °F (36.4 °C)-98.1 °F (36.7 °C)] 97.5 °F (36.4 °C)  Pulse:  [] 91  Resp:  [11-23] 17  SpO2:  [95 %-100 %] 100 %  BP: ()/(51-79) 103/59     Weight: 54.9 kg (121 lb 0.5 oz)  Body mass index is 20.78 kg/m².    Intake/Output Summary (Last 24 hours) at 10/9/2024 1530  Last data filed at 10/9/2024 1203  Gross per 24 hour   Intake 818 ml   Output 650 ml   Net 168 ml         Physical Exam  Vitals reviewed.   Constitutional:       General: She is not in acute distress.     Appearance: Normal appearance. She is normal weight. She is not ill-appearing.      Comments: Cachectic   HENT:      Head: Normocephalic and atraumatic.      Nose: Nose normal.      Mouth/Throat:      Mouth: Mucous membranes are moist.      Pharynx:  Oropharynx is clear.   Eyes:      General: No scleral icterus.     Extraocular Movements: Extraocular movements intact.      Conjunctiva/sclera: Conjunctivae normal.      Pupils: Pupils are equal, round, and reactive to light.   Cardiovascular:      Rate and Rhythm: Tachycardia present. Rhythm irregular.      Pulses: Normal pulses.      Heart sounds: Normal heart sounds. No murmur heard.     No gallop.   Pulmonary:      Effort: Pulmonary effort is normal. No respiratory distress.      Breath sounds: No wheezing, rhonchi or rales.      Comments: Decreased air movement bases  Chest:      Chest wall: No tenderness.   Abdominal:      General: Abdomen is flat. There is no distension.      Palpations: Abdomen is soft.      Tenderness: There is abdominal tenderness.   Musculoskeletal:         General: No swelling or tenderness.      Cervical back: Normal range of motion and neck supple. No rigidity or tenderness.      Right lower leg: No edema.      Left lower leg: No edema.   Skin:     General: Skin is warm and dry.   Neurological:      General: No focal deficit present.      Mental Status: She is alert and oriented to person, place, and time. Mental status is at baseline.      Motor: No weakness.   Psychiatric:         Mood and Affect: Mood normal.         Behavior: Behavior normal.         Thought Content: Thought content normal.             Significant Labs: All pertinent labs within the past 24 hours have been reviewed.  Recent Lab Results         10/09/24  0346   10/09/24  0345   10/08/24  2100        Albumin 2.8           ALP 43           ALT 10           Anion Gap 9           AST 13           Baso # 0.01           Basophil % 0.3           BILIRUBIN TOTAL 0.6  Comment: For infants and newborns, interpretation of results should be based  on gestational age, weight and in agreement with clinical  observations.    Premature Infant recommended reference ranges:  Up to 24 hours.............<8.0 mg/dL  Up to 48  hours............<12.0 mg/dL  3-5 days..................<15.0 mg/dL  6-29 days.................<15.0 mg/dL             BUN 23           Calcium 8.6           Chloride 97           CO2 30           Creatinine 1.3           Differential Method Automated           eGFR 40.8           Eos # 0.0           Eos % 0.3           Glucose 136           Gran # (ANC) 1.8           Gran % 61.1           Hematocrit 31.1           Hemoglobin 9.4           Immature Grans (Abs) 0.05  Comment: Mild elevation in immature granulocytes is non specific and   can be seen in a variety of conditions including stress response,   acute inflammation, trauma and pregnancy. Correlation with other   laboratory and clinical findings is essential.             Immature Granulocytes 1.7           Lymph # 0.4           Lymph % 14.2           Magnesium    1.6         MCH 30.3           MCHC 30.2                      Mono # 0.7           Mono % 22.4           MPV 9.7           nRBC 0           Platelet Count 116           Potassium 3.8           PROTEIN TOTAL 6.3           RBC 3.10           RDW 17.7           Sodium 136           Vancomycin, Random     6.6       WBC 2.95                   Significant Imaging: I have reviewed all pertinent imaging results/findings within the past 24 hours.  Imaging Results              X-Ray Chest AP Portable (Final result)  Result time 10/07/24 19:05:03      Final result by Virgil Gongora MD (10/07/24 19:05:03)                   Impression:      Abnormal chest radiograph as above.      Electronically signed by: Virgil Gongora MD  Date:    10/07/2024  Time:    19:05               Narrative:    EXAMINATION:  XR CHEST AP PORTABLE    CLINICAL HISTORY:  Sepsis;    TECHNIQUE:  Single frontal view of the chest was performed.    COMPARISON:  06/19/2024    FINDINGS:  There is a right-sided MediPort catheter in similar position.  Cardiac monitoring leads overlie the chest.  There is stable mild rightward deviation of  the trachea.  Cardiac silhouette is stable in size and configuration.  There is aortic atherosclerosis.  There is a large loculated right-sided pleural effusion.  There are diffuse patchy airspace opacities throughout the aerated right lung as well as the left lung which may reflect edema, aspiration, or multifocal infection.  No large volume of left-sided pleural fluid appreciated radiographically.  There are bilateral calcified breast prosthesis present.  No definite pneumothorax identified.  Osseous structures demonstrate degenerative changes.                                        CTA Chest Abdomen Pelvis (Final result)  Result time 10/07/24 19:09:26      Final result by Joycelyn Taveras MD (10/07/24 19:09:26)                   Impression:      Postsurgical changes of right lower lobectomy.  Loculated right pleural effusion similar to prior.  New left pleural effusion and worsening diffuse bilateral airspace opacities interlobular septal thickening, concerning for progression of malignancy and or superimposed pneumonia.    New mesenteric stranding worse along the ascending colon.  Findings could be due to volume overload or infectious/inflammatory colitis.    Severe aortic atheromatous plaque.  No aortic aneurysm or dissection.  The renal and mesenteric arteries are patent.    This report was flagged in [employer] as abnormal.      Electronically signed by: Joycelyn Taveras  Date:    10/07/2024  Time:    19:09               Narrative:    EXAMINATION:  CTA CHEST ABDOMEN PELVIS    CLINICAL HISTORY:  mesenteric ischemia eval vs aortic eval;    TECHNIQUE:  Low dose axial images, sagittal and coronal reformations were obtained from the thoracic inlet through the pelvis following the IV administration of 100 mL of Omnipaque 350 .  No oral contrast was administered.    COMPARISON:  Chest radiograph 06/19/2024, CT chest abdomen pelvis 12/10/2023    FINDINGS:  Cardiomegaly.  No pericardial effusion.  No thoracic aortic aneurysm.   No acute pulmonary embolus.    No enlarged axillary or mediastinal nodes.  No enlarged hilar lymph nodes.    Loculated right pleural effusion similar to prior.  New small left pleural effusion.  Basilar predominant consolidation.  Central airways are clear.  Postsurgical changes of right lower lobectomy.  Diffuse ground-glass opacities, interlobular septal thickening and bronchial wall thickening increased compared to prior exam and now involving the left lung.  Bronchocentric consolidation in the right upper lobe.  Pulmonary emphysema.    Liver is normal in morphology and with smooth contour.  Stable right hepatic lobe cyst.    The spleen, pancreas, adrenal glands and kidneys are normal.  No intra or extrahepatic biliary ductal dilatation.  The gallbladder is surgically absent.  Right renal cysts better evaluated on prior CT.    Abdominal aorta with severe atherosclerotic calcification..  No aortic aneurysm or dissection.  The renal arteries and mesenteric arteries are patent.  No enlarged retroperitoneal lymph nodes.    Colonic diverticula.  New mesenteric stranding, worse about the ascending colon.  No bowel dilatation.  No organized fluid collection or free air.    Bladder is smooth walled.  Uterus is absent.  No enlarged pelvic lymph nodes.    Postsurgical changes of right femur ORIF with hardware intact.  Dextroscoliosis of the lumbar spine.  Bilateral breast implants in place.

## 2024-10-09 NOTE — PROGRESS NOTES
[facility]  Department of Cardiology  Consult Note      PATIENT NAME: Alexa Otero  MRN: [id number]  TODAY'S DATE: 10/09/2024  ADMIT DATE: 10/7/2024                          CONSULT REQUESTED BY: Karine Goodrich DO    SUBJECTIVE     PRINCIPAL PROBLEM: Bilateral pneumonia      REASON FOR CONSULT:  Afib with RVR    INTERVAL HISTORY:  10/9/24  HR trended up on max Cardizem  PO amiodarone started per pulmonology        HPI:  84-year-old female with PMH CAD, lung cancer stage IV, DM 2, who presented to the ED via EMS with reports of abdominal pain, N/V/D x3 days found to be in atrial fib with RVR.  Patient reports that she has stage IV lung cancer and went to her clinic for follow up.  She states that she was having and has had severe abdominal pain with NVD since her last chemotherapy.     Patient noted to be in atrial fibrillation with RVR on arrival to ER.  She denies prior history of atrial fibrillation but clinic notes indicate prior history.  She states she wore a heart monitor in the past that she thinks was normal.  She has been diagnosed with bilateral pneumonia.  She currently on IV Cardizem rate is variable from 100-120 bpm.  She states she does not feel palpitations or chest pain.  Her breathing is comfortable on low-flow oxygen.      Review of patient's allergies indicates:  No Known Allergies    Past Medical History:   Diagnosis Date    Arthritis     Cardiac arrest 10/2023    Cataract     Chronic obstructive pulmonary disease, unspecified COPD type 3/20/2024    Colon polyp     Coronary artery disease 3/20/2024    Diabetes mellitus type II 2012    Encounter for blood transfusion     Extra-ovarian endometrioid adenocarcinoma 2020    GERD (gastroesophageal reflux disease)     History of chronic cough     clear productive    Hyperlipidemia     Hypertension     Macular degeneration     Ovarian cancer     PONV (postoperative nausea and vomiting)     Squamous cell carcinoma 1980's    precancer of  cervix     Past Surgical History:   Procedure Laterality Date    AUGMENTATION OF BREAST      BRONCHOSCOPY N/A 12/12/2023    Procedure: BRONCHOSCOPY;  Surgeon: Neva Christian MD;  Location: [facility];  Service: ENT;  Laterality: N/A;    BRONCHOSCOPY WITH FLUOROSCOPY Right 05/11/2023    Procedure: BRONCHOSCOPY, WITH FLUOROSCOPY;  Surgeon: Neva Christian MD;  Location: [facility];  Service: Pulmonary;  Laterality: Right;    BRONCHOSCOPY WITH FLUOROSCOPY N/A 12/15/2023    Procedure: BRONCHOSCOPY, WITH FLUOROSCOPY;  Surgeon: Neva Christian MD;  Location: [facility];  Service: Pulmonary;  Laterality: N/A;    CATARACT EXTRACTION BILATERAL W/ ANTERIOR VITRECTOMY Bilateral     CHOLECYSTECTOMY      COLONOSCOPY      COLONOSCOPY N/A 04/20/2023    Procedure: COLONOSCOPY;  Surgeon: Sabino Stephenson MD;  Location: [facility];  Service: Endoscopy;  Laterality: N/A;    CORONARY STENT PLACEMENT  10/2023    x 3    ESOPHAGOGASTRODUODENOSCOPY N/A 06/20/2018    Procedure: EGD (ESOPHAGOGASTRODUODENOSCOPY);  Surgeon: Sabino Stephenson MD;  Location: [facility];  Service: Endoscopy;  Laterality: N/A;    ESOPHAGOGASTRODUODENOSCOPY N/A 03/31/2023    Procedure: EGD (ESOPHAGOGASTRODUODENOSCOPY);  Surgeon: Sabino Stephenson MD;  Location: [facility];  Service: Endoscopy;  Laterality: N/A;    ESOPHAGOGASTRODUODENOSCOPY N/A 12/11/2023    Procedure: EGD (ESOPHAGOGASTRODUODENOSCOPY);  Surgeon: Pretty Salgado MD;  Location: [facility];  Service: Endoscopy;  Laterality: N/A;    HYSTERECTOMY  1976    partial    INJECTION OF ANESTHETIC AGENT AROUND MEDIAL BRANCH NERVES INNERVATING LUMBAR FACET JOINT Right 05/10/2023    Procedure: Block-nerve-Lateral-branch-lumbar;  Surgeon: Agustin Robles MD;  Location: [facility] OR;  Service: Pain Management;  Laterality: Right;  L5 and s1,s2 LBB    INJECTION OF ANESTHETIC AGENT AROUND MEDIAL BRANCH NERVES INNERVATING LUMBAR FACET JOINT Right 05/30/2023    Procedure: Block-nerve-medial branch-lumbar;  Surgeon: Agustin  LILIBETH Robles MD;  Location: [facility] OR;  Service: Pain Management;  Laterality: Right;  L5, s1 ,s2 LBB #2    INJECTION OF ANESTHETIC AGENT AROUND NERVE Right 10/31/2023    Procedure: INTERCOSTAL NERVE BLOCK;  Surgeon: Washington Shankar MD;  Location: [facility] OR;  Service: Cardiothoracic;  Laterality: Right;    INJECTION, SACROILIAC JOINT Right 01/26/2023    Procedure: INJECTION,SACROILIAC JOINT;  Surgeon: Agustin Robles MD;  Location: [facility] OR;  Service: Pain Management;  Laterality: Right;    INSERTION OF TUNNELED CENTRAL VENOUS CATHETER (CVC) WITH SUBCUTANEOUS PORT Right 10/19/2020    Procedure: INSERTION, PORT-A-CATH;  Surgeon: Misti Mcfarland MD;  Location: [facility];  Service: General;  Laterality: Right;    INTRAMEDULLARY RODDING OF TROCHANTER OF FEMUR Right 11/28/2021    Procedure: INSERTION, INTRAMEDULLARY LUC, FEMUR, TROCHANTER/RIGHT TFN DR FAJARDO NOTIFIED REP;  Surgeon: Kp Fajardo MD;  Location: [facility];  Service: Orthopedics;  Laterality: Right;  SKIP    ROBOT-ASSISTED LAPAROSCOPIC LYMPHADENECTOMY USING DA NELLA XI N/A 09/21/2020    Procedure: XI ROBOTIC LYMPHADENECTOMY-pelvic and kell-aortic;  Surgeon: Altagracia Ray MD;  Location: [facility] OR;  Service: OB/GYN;  Laterality: N/A;    ROBOT-ASSISTED LAPAROSCOPIC OMENTECTOMY USING DA NELLA XI N/A 09/21/2020    Procedure: XI ROBOTIC OMENTECTOMY;  Surgeon: Altagracia Ray MD;  Location: [facility] OR;  Service: OB/GYN;  Laterality: N/A;    ROBOT-ASSISTED LAPAROSCOPIC SALPINGO-OOPHORECTOMY USING DA NELLA XI Bilateral 09/21/2020    Procedure: XI ROBOTIC SALPINGO-OOPHORECTOMY;  Surgeon: Altagracia Ray MD;  Location: [facility] OR;  Service: OB/GYN;  Laterality: Bilateral;    THORACOSCOPIC BIOPSY OF PLEURA Right 10/31/2023    Procedure: VATS, WITH PLEURA BIOPSY;  Surgeon: Washington Shankar MD;  Location: [facility] OR;  Service: Cardiothoracic;  Laterality: Right;  FROZEN SECTION   **KRISTEN-ASSTIFFT**    THORACOTOMY Right 10/31/2023    Procedure: THORACOTOMY;  Surgeon: Raad  Washington WELCH MD;  Location: [facility] OR;  Service: Cardiothoracic;  Laterality: Right;    UPPER GASTROINTESTINAL ENDOSCOPY       Social History     Tobacco Use    Smoking status: Former     Current packs/day: 0.00     Average packs/day: 1 pack/day for 20.0 years (20.0 ttl pk-yrs)     Types: Cigarettes     Start date:      Quit date:      Years since quittin.8    Smokeless tobacco: Never    Tobacco comments:     quit 40 yrs ago   Substance Use Topics    Alcohol use: Yes     Alcohol/week: 1.0 standard drink of alcohol     Types: 1 Glasses of wine per week     Comment: occasional    Drug use: No        REVIEW OF SYSTEMS  Negative except as mentioned in HPI    OBJECTIVE     VITAL SIGNS (Most Recent)  Temp: 97.5 °F (36.4 °C) (10/09/24 0706)  Pulse: 109 (10/09/24 0900)  Resp: 20 (10/09/24 0900)  BP: (!) 95/51 (10/09/24 0900)  SpO2: 100 % (10/09/24 0900)    VENTILATION STATUS  Resp: 20 (10/09/24 0900)  SpO2: 100 % (10/09/24 0900)           I & O (Last 24H):  Intake/Output Summary (Last 24 hours) at 10/9/2024 1048  Last data filed at 10/9/2024 0913  Gross per 24 hour   Intake 578 ml   Output 650 ml   Net -72 ml       WEIGHTS  Wt Readings from Last 3 Encounters:   10/09/24 0707 54.9 kg (121 lb 0.5 oz)   10/07/24 1750 54.9 kg (121 lb 0.5 oz)   10/07/24 1535 54.1 kg (119 lb 4.3 oz)   10/02/24 1026 56.6 kg (124 lb 11.2 oz)       PHYSICAL EXAM    GENERAL:  Elderly Female resting comfortably in bed in no apparent distress.  HEENT: Normocephalic.    NECK: No JVD.   CARDIAC:  Irregular tachycardic rate and rhythm. S1 is normal.S2 is normal.No gallops, clicks or murmurs noted at this time.  CHEST ANATOMY: normal. Right ant chest wall port in place  LUNGS:  No cough, no conversational dyspnea, low-flow O2 in use. Breath sounds diminished at bases  ABDOMEN: Soft.   .  Normal bowel sounds.    EXTREMITIES: No edema  CENTRAL NERVOUS SYSTEM: AAO x 3  SKIN: No rash     HOME MEDICATIONS:  No current facility-administered medications  on file prior to encounter.     Current Outpatient Medications on File Prior to Encounter   Medication Sig Dispense Refill    amiodarone (PACERONE) 200 MG Tab Take 1 tablet (200 mg total) by mouth once daily. 60 tablet 11    apixaban (ELIQUIS) 5 mg Tab Take 1 tablet (5 mg total) by mouth 2 (two) times daily. 2 tablets twice a day for a week, then decrease to 1 tablet twice a day 60 tablet 11    aspirin (ECOTRIN) 81 MG EC tablet Take 81 mg by mouth once daily.      benazepriL (LOTENSIN) 40 MG tablet Take 0.5 tablets (20 mg total) by mouth once daily. 45 tablet 3    clopidogreL (PLAVIX) 75 mg tablet Take 1 tablet (75 mg total) by mouth once daily. 90 tablet 2    folic acid (FOLVITE) 1 MG tablet Take 1 tablet (1 mg total) by mouth once daily. 100 tablet 3    gabapentin (NEURONTIN) 100 MG capsule Take 1 capsule (100 mg total) by mouth 2 (two) times daily. 180 capsule 3    HYDROcodone-acetaminophen (NORCO) 5-325 mg per tablet Take 1 tablet by mouth every 6 (six) hours as needed for Pain.      LORazepam (ATIVAN) 1 MG tablet Take 1 tablet (1 mg total) by mouth every 6 (six) hours as needed for Anxiety (insomnia). 30 tablet 2    magnesium oxide (MAG-OX) 400 mg (241.3 mg magnesium) tablet Take 1 tablet (400 mg total) by mouth once daily. Patient requested refill 90 tablet 3    metoprolol succinate (TOPROL-XL) 25 MG 24 hr tablet Take 1 tablet (25 mg total) by mouth once daily. 90 tablet 3    potassium chloride (K-TAB) 20 mEq Take 1 tablet by mouth once daily.      promethazine (PHENERGAN) 25 MG tablet Take 1 tablet (25 mg total) by mouth every 4 (four) hours as needed for Nausea. 30 tablet 5    VIT A/VIT C/VIT E/ZINC/COPPER (ICAPS AREDS ORAL) Take 1 capsule by mouth 2 (two) times a day.          SCHEDULED MEDS:   amiodarone in dextrose  150 mg Intravenous Once    apixaban  5 mg Oral BID    folic acid  1 mg Oral Daily    gabapentin  100 mg Oral BID    magnesium oxide  400 mg Oral Daily    mupirocin   Nasal BID       CONTINUOUS  INFUSIONS:   amiodarone in dextrose 5%  1 mg/min Intravenous Continuous        amiodarone in dextrose 5%  0.5 mg/min Intravenous Continuous        dilTIAZem  10 mg/hr Intravenous Continuous 10 mL/hr at 10/09/24 1032 10 mg/hr at 10/09/24 1032       PRN MEDS:  Current Facility-Administered Medications:     acetaminophen, 650 mg, Oral, Q8H PRN    acetaminophen, 650 mg, Oral, Q4H PRN    albuterol-ipratropium, 3 mL, Nebulization, Q6H PRN    aluminum-magnesium hydroxide-simethicone, 30 mL, Oral, QID PRN    dextrose 50%, 12.5 g, Intravenous, PRN    dextrose 50%, 25 g, Intravenous, PRN    glucagon (human recombinant), 1 mg, Intramuscular, PRN    glucose, 16 g, Oral, PRN    glucose, 24 g, Oral, PRN    HYDROcodone-acetaminophen, 1 tablet, Oral, Q6H PRN    magnesium oxide, 800 mg, Oral, PRN    magnesium oxide, 800 mg, Oral, PRN    melatonin, 6 mg, Oral, Nightly PRN    morphine, 1 mg, Intravenous, Q4H PRN    naloxone, 0.02 mg, Intravenous, PRN    ondansetron, 4 mg, Intravenous, Q6H PRN    potassium bicarbonate, 35 mEq, Oral, PRN    potassium bicarbonate, 50 mEq, Oral, PRN    potassium bicarbonate, 60 mEq, Oral, PRN    potassium, sodium phosphates, 2 packet, Oral, PRN    potassium, sodium phosphates, 2 packet, Oral, PRN    potassium, sodium phosphates, 2 packet, Oral, PRN    sodium chloride 0.9%, 2 mL, Intravenous, PRN    LABS AND DIAGNOSTICS     CBC LAST 3 DAYS  Recent Labs   Lab 10/07/24  1713 10/08/24  0513 10/09/24  0346   WBC 3.29* 2.41* 2.95*   RBC 3.28* 2.54* 3.10*   HGB 10.1* 8.0* 9.4*   HCT 33.8* 26.1* 31.1*   * 103* 100*   MCH 30.8 31.5* 30.3   MCHC 29.9* 30.7* 30.2*   RDW 18.3* 18.0* 17.7*    121* 116*   MPV 9.3 9.4 9.7   GRAN 82.7*  2.7 66.9  1.6* 61.1  1.8   LYMPH 9.1*  0.3* 20.3  0.5* 14.2*  0.4*   MONO 6.1  0.2* 10.8  0.3 22.4*  0.7   BASO 0.01 0.01 0.01   NRBC 0 0 0       COAGULATION LAST 3 DAYS  Recent Labs   Lab 10/07/24  1713   INR 1.1       CHEMISTRY LAST 3 DAYS  Recent Labs   Lab  "10/07/24  1713 10/08/24  0513 10/09/24  0345 10/09/24  0346    137  --  136   K 4.1 3.8  --  3.8   CL 99 102  --  97   CO2 29 29  --  30*   ANIONGAP 9 6*  --  9   BUN 28* 22  --  23   CREATININE 1.2 1.0  --  1.3    110  --  136*   CALCIUM 8.9 7.9*  --  8.6*   MG 1.8  --  1.6  --    ALBUMIN 3.0* 2.5*  --  2.8*   PROT 6.9 5.5*  --  6.3   ALKPHOS 49* 38*  --  43*   ALT 13 8*  --  10   AST 13 11  --  13   BILITOT 0.5 0.5  --  0.6       CARDIAC PROFILE LAST 3 DAYS  Recent Labs   Lab 10/07/24  1713   *       ENDOCRINE LAST 3 DAYS  Recent Labs   Lab 10/07/24  1713   TSH 0.876       LAST ARTERIAL BLOOD GAS  ABG  No results for input(s): "PH", "PO2", "PCO2", "HCO3", "BE" in the last 168 hours.    LAST 7 DAYS MICROBIOLOGY   Microbiology Results (last 7 days)       Procedure Component Value Units Date/Time    Blood culture x two cultures. Draw prior to antibiotics. [[id number]] Collected: 10/07/24 1753    Order Status: Completed Specimen: Blood from Peripheral, Hand, Left Updated: 10/08/24 2032     Blood Culture, Routine No Growth to date      No Growth to date    Narrative:      Aerobic and anaerobic    Blood culture x two cultures. Draw prior to antibiotics. [[id number]] Collected: 10/07/24 1758    Order Status: Completed Specimen: Blood from Peripheral, Hand, Right Updated: 10/08/24 2032     Blood Culture, Routine No Growth to date      No Growth to date    Narrative:      Aerobic and anaerobic    Respiratory Infection Panel (PCR), Nasopharyngeal [[id number]] Collected: 10/07/24 2135    Order Status: Completed Specimen: Nasopharyngeal Swab Updated: 10/07/24 2323     Respiratory Infection Panel Source NP swab     Adenovirus Not Detected     Coronavirus 229E, Common Cold Virus Not Detected     Coronavirus HKU1, Common Cold Virus Not Detected     Coronavirus NL63, Common Cold Virus Not Detected     Coronavirus OC43, Common Cold Virus Not Detected     Comment: Coronavirus strains 229E, HKU1, NL63, and OC43 " can cause the common   cold   and are not associated with the respiratory disease outbreak caused   by  the COVID-19 (SARS-CoV-2 novel Coronavirus) strain.           SARS-CoV2 (COVID-19) Qualitative PCR Not Detected     Human Metapneumovirus Not Detected     Human Rhinovirus/Enterovirus Not Detected     Influenza A (subtypes H1, H1-2009,H3) Not Detected     Influenza B Not Detected     Parainfluenza Virus 1 Not Detected     Parainfluenza Virus 2 Not Detected     Parainfluenza Virus 3 Not Detected     Parainfluenza Virus 4 Not Detected     Respiratory Syncytial Virus Not Detected     Bordetella Parapertussis (CV8426) Not Detected     Bordetella pertussis (ptxP) Not Detected     Chlamydia pneumoniae Not Detected     Mycoplasma pneumoniae Not Detected     Comment: Respiratory Infection Panel testing performed by Multiplex PCR.       Narrative:      Respiratory Infection Panel source->NP Swab            MOST RECENT IMAGING  Echo    Left Ventricle: The left ventricle is normal in size. Mildly increased   wall thickness. There is mild concentric hypertrophy. Moderate global   hypokinesis present. There is mildly reduced systolic function with a   visually estimated ejection fraction of 40 - 45%. There is normal   diastolic function.    Right Ventricle: Normal right ventricular cavity size. Wall thickness   is normal. Systolic function is normal.    Left Atrium: Left atrium is moderately dilated.    Mitral Valve: There is bileaflet sclerosis. There is moderate mitral   annular calcification present. There is moderate stenosis. The mean   pressure gradient across the mitral valve is 8 mmHg at a heart rate of    bpm. There is mild regurgitation with a centrally directed jet.    Pulmonary Artery: The estimated pulmonary artery systolic pressure is   29 mmHg.    IVC/SVC: Normal venous pressure at 3 mmHg.      ECHOCARDIOGRAM RESULTS (last 5)  Results for orders placed during the hospital encounter of  10/13/23    Echo    Interpretation Summary    Left Ventricle: The left ventricle is normal in size. Normal wall thickness. Normal wall motion. There is normal systolic function with a visually estimated ejection fraction of 55 - 60%. Grade I diastolic dysfunction.    Right Ventricle: Normal right ventricular cavity size. Wall thickness is normal. Right ventricle wall motion  is normal. Systolic function is normal.    Aortic Valve: There is moderate aortic valve sclerosis.    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 There is mild mitral annular calcification present. There is no stenosis. The mean pressure gradient across the mitral valve is 3 mmHg at a heart rate of  bpm. There is mild regurgitation.    Tricuspid Valve: There is mild regurgitation.  No pulmonary hypertension.    IVC/SVC: Normal venous pressure at 3 mmHg.      CURRENT/PREVIOUS VISIT EKG  Results for orders placed or performed during the hospital encounter of 10/07/24   EKG 12-lead    Collection Time: 10/07/24  8:41 PM   Result Value Ref Range    QRS Duration 66 ms    [facility] QTC Calculation 453 ms    Narrative    Test Reason : R07.9,    Vent. Rate : 137 BPM     Atrial Rate : 000 BPM     P-R Int : 000 ms          QRS Dur : 066 ms      QT Int : 300 ms       P-R-T Axes : 000 009 090 degrees     QTc Int : 453 ms    Atrial fibrillation with rapid ventricular response  Anteroseptal infarct (cited on or before 13-OCT-2023)  Abnormal ECG  When compared with ECG of 07-OCT-2024 17:03,  No significant change was found    Referred By: [email or web address]   SELF           Confirmed By:            ASSESSMENT/PLAN:     Active Hospital Problems    Diagnosis    *Bilateral pneumonia    Generalized abdominal pain    Malignant neoplasm of right lung stage 4    HTN (hypertension)    Bilateral pleural effusion    Oxygen dependent    Atrial fibrillation with RVR    ACP (advance care planning)    CKD stage 3a, GFR 45-59 ml/min       ASSESSMENT & PLAN:   CAD  Atrial  fibrillation with RVR  HTN  Pneumonia  CKD  Right lung Ca stage 4      RECOMMENDATIONS:  HR remains uncontrolled on maximum IV Cardizem  Ordered Amiodarone 150 mg IV bolus per protocol  Ordered Amiodarone IV gtt after per protocol  Reduce IV Cardizem to 10 mg/hr  Continue anticoagulation w/ Eliquis 5 mg BID  Continue to hold Plavix and aspirin and closely monitor CBC (daily)  Echocardiogram showed Efx 40-45%, moderate MV stenosis  TSH normal this admission  Mg 1.6 today. Replace today and follow electrolytes replacement protocol to keep K>/= 4 and magnesium >/= 2.   Obtain strict I/O & daily weight    Hold NPO after midnight for possible VASILIY/CV if pt does not convert overnight.  Risks of VASILIY were discussed with patient the risks include but not limited to oropharyngeal trauma, dental trauma, trauma to the esophagus, possible aspiration, and reaction to anesthesia. Risks of cardioversion include but are not limited to arrhythmia and stroke.     Above plan discussed with pt's nurses      Erika Valdivia NP  [facility]  Department of Cardiology  Date of Service: 10/09/2024

## 2024-10-09 NOTE — PLAN OF CARE
Problem: Skin Injury Risk Increased  Goal: Skin Health and Integrity  Intervention: Promote and Optimize Oral Intake  Flowsheets (Taken 10/9/2024 1024)  Oral Nutrition Promotion: calorie-dense liquids provided     Problem: Oral Intake Inadequate  Goal: Improved Oral Intake  Outcome: Progressing  Intervention: Promote and Optimize Oral Intake  Flowsheets (Taken 10/9/2024 1024)  Oral Nutrition Promotion: calorie-dense liquids provided

## 2024-10-09 NOTE — ANESTHESIA PREPROCEDURE EVALUATION
10/09/2024  Alexa Otero is a 84 y.o., female.      Tobacco Use:  The patient  reports that she quit smoking about 43 years ago. Her smoking use included cigarettes. She started smoking about 63 years ago. She has a 20 pack-year smoking history. She has never used smokeless tobacco.     Results for orders placed or performed during the hospital encounter of 10/07/24   EKG 12-lead    Collection Time: 10/07/24  8:41 PM   Result Value Ref Range    QRS Duration 66 ms    [facility] QTC Calculation 453 ms    Narrative    Test Reason : R07.9,    Vent. Rate : 137 BPM     Atrial Rate : 000 BPM     P-R Int : 000 ms          QRS Dur : 066 ms      QT Int : 300 ms       P-R-T Axes : 000 009 090 degrees     QTc Int : 453 ms    Atrial fibrillation with rapid ventricular response  Anteroseptal infarct (cited on or before 13-OCT-2023)  Abnormal ECG  When compared with ECG of 07-OCT-2024 17:03,  No significant change was found    Referred By: [email or web address]   SELF           Confirmed By:         Imaging Results              X-Ray Chest AP Portable (Final result)  Result time 10/07/24 19:05:03      Final result by Virgil Gongora MD (10/07/24 19:05:03)                   Impression:      Abnormal chest radiograph as above.      Electronically signed by: Virgil Gongora MD  Date:    10/07/2024  Time:    19:05               Narrative:    EXAMINATION:  XR CHEST AP PORTABLE    CLINICAL HISTORY:  Sepsis;    TECHNIQUE:  Single frontal view of the chest was performed.    COMPARISON:  06/19/2024    FINDINGS:  There is a right-sided MediPort catheter in similar position.  Cardiac monitoring leads overlie the chest.  There is stable mild rightward deviation of the trachea.  Cardiac silhouette is stable in size and configuration.  There is aortic atherosclerosis.  There is a large loculated right-sided pleural effusion.  There are diffuse patchy  airspace opacities throughout the aerated right lung as well as the left lung which may reflect edema, aspiration, or multifocal infection.  No large volume of left-sided pleural fluid appreciated radiographically.  There are bilateral calcified breast prosthesis present.  No definite pneumothorax identified.  Osseous structures demonstrate degenerative changes.                                        CTA Chest Abdomen Pelvis (Final result)  Result time 10/07/24 19:09:26      Final result by Joycelyn Taveras MD (10/07/24 19:09:26)                   Impression:      Postsurgical changes of right lower lobectomy.  Loculated right pleural effusion similar to prior.  New left pleural effusion and worsening diffuse bilateral airspace opacities interlobular septal thickening, concerning for progression of malignancy and or superimposed pneumonia.    New mesenteric stranding worse along the ascending colon.  Findings could be due to volume overload or infectious/inflammatory colitis.    Severe aortic atheromatous plaque.  No aortic aneurysm or dissection.  The renal and mesenteric arteries are patent.    This report was flagged in Epic as abnormal.      Electronically signed by: Joycelyn Taveras  Date:    10/07/2024  Time:    19:09               Narrative:    EXAMINATION:  CTA CHEST ABDOMEN PELVIS    CLINICAL HISTORY:  mesenteric ischemia eval vs aortic eval;    TECHNIQUE:  Low dose axial images, sagittal and coronal reformations were obtained from the thoracic inlet through the pelvis following the IV administration of 100 mL of Omnipaque 350 .  No oral contrast was administered.    COMPARISON:  Chest radiograph 06/19/2024, CT chest abdomen pelvis 12/10/2023    FINDINGS:  Cardiomegaly.  No pericardial effusion.  No thoracic aortic aneurysm.  No acute pulmonary embolus.    No enlarged axillary or mediastinal nodes.  No enlarged hilar lymph nodes.    Loculated right pleural effusion similar to prior.  New small left pleural  effusion.  Basilar predominant consolidation.  Central airways are clear.  Postsurgical changes of right lower lobectomy.  Diffuse ground-glass opacities, interlobular septal thickening and bronchial wall thickening increased compared to prior exam and now involving the left lung.  Bronchocentric consolidation in the right upper lobe.  Pulmonary emphysema.    Liver is normal in morphology and with smooth contour.  Stable right hepatic lobe cyst.    The spleen, pancreas, adrenal glands and kidneys are normal.  No intra or extrahepatic biliary ductal dilatation.  The gallbladder is surgically absent.  Right renal cysts better evaluated on prior CT.    Abdominal aorta with severe atherosclerotic calcification..  No aortic aneurysm or dissection.  The renal arteries and mesenteric arteries are patent.  No enlarged retroperitoneal lymph nodes.    Colonic diverticula.  New mesenteric stranding, worse about the ascending colon.  No bowel dilatation.  No organized fluid collection or free air.    Bladder is smooth walled.  Uterus is absent.  No enlarged pelvic lymph nodes.    Postsurgical changes of right femur ORIF with hardware intact.  Dextroscoliosis of the lumbar spine.  Bilateral breast implants in place.                                       Lab Results   Component Value Date    WBC 2.95 (L) 10/09/2024    HGB 9.4 (L) 10/09/2024    HCT 31.1 (L) 10/09/2024     (H) 10/09/2024     (L) 10/09/2024     BMP  Lab Results   Component Value Date     10/09/2024    K 3.8 10/09/2024    CL 97 10/09/2024    CO2 30 (H) 10/09/2024    BUN 23 10/09/2024    CREATININE 1.3 10/09/2024    CALCIUM 8.6 (L) 10/09/2024    ANIONGAP 9 10/09/2024     (H) 10/09/2024     10/08/2024     10/07/2024       Results for orders placed during the hospital encounter of 10/13/23    Echo    Interpretation Summary    Left Ventricle: The left ventricle is normal in size. Normal wall thickness. Normal wall motion. There is  normal systolic function with a visually estimated ejection fraction of 55 - 60%. Grade I diastolic dysfunction.    Right Ventricle: Normal right ventricular cavity size. Wall thickness is normal. Right ventricle wall motion  is normal. Systolic function is normal.    Aortic Valve: There is moderate aortic valve sclerosis.    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 There is mild mitral annular calcification present. There is no stenosis. The mean pressure gradient across the mitral valve is 3 mmHg at a heart rate of  bpm. There is mild regurgitation.    Tricuspid Valve: There is mild regurgitation.  No pulmonary hypertension.    IVC/SVC: Normal venous pressure at 3 mmHg.     10/27/23 08:52   Group & Rh A POS   INDIRECT ELIAS NEG   Specimen Outdate 11/10/2023 23:59       Pre-op Assessment    I have reviewed the Patient Summary Reports.     I have reviewed the Nursing Notes. I have reviewed the NPO Status.   I have reviewed the Medications.     Review of Systems  Anesthesia Hx:  No problems with previous Anesthesia   History of prior surgery of interest to airway management or planning: lung surgery.         Denies Family Hx of Anesthesia complications.   Personal Hx of Anesthesia complications, Post-Operative Nausea/Vomiting, in the past, but not with recent anesthetics / prophylaxis                    Social:  Alcohol Use, Former Smoker       Hematology/Oncology:           --  Thrombocytopenia:            Hematology Comments: Plavix Therapy       --  Cancer in past history:              surgery and chemotherapy   Oncology Comments: Extra-ovarian endometrioid adenocarcinoma  Squamous cell carcinoma  Lepidic adenocarcinoma     EENT/Dental:  EENT/Dental Normal  Macular degeneration Eyes: Visual Impairment   Has Bilateral and S/P Extraction - Bilateral Catarract                  Cardiovascular:     Hypertension, poorly controlled   CAD  asymptomatic CABG/stent Dysrhythmias atrial fibrillation       hyperlipidemia   ECG has been reviewed. Hx of cardiac arrest secondary to ventricular fibrillation during cardiac stent placement     Patient cleared for subsequent surgery by Dr. Julio                          Pulmonary:   COPD      History of chronic cough  Hx Aspiration pneumonia of right lung  Left Pleural Effusion   Home Oxygen Dependent 2 Liters / NC   Chronic Obstructive Pulmonary Disease (COPD):              Chest Tumor/Mass:    Lung Cancer, Adenocarcinoma         Renal/:  Chronic Renal Disease        Kidney Function/Disease, Chronic Kidney Disease (CKD) , CKD Stage III (GFR 30-59)            Hepatic/GI:     GERD, well controlled Liver Disease,  History of colon polyps  Pancreatic pseudocyst/cyst  Mid-epigastric pain   Patient denies N/V   Esophageal / Stomach Disorders Esophageal Disorder, Kwon's Esophagus        Pancreatic Disease   Musculoskeletal:  Arthritis      Joint Disease:  Arthritis, Osteoarthritis     Spine Disorders: lumbar Chronic Pain           Neurological:    Neuromuscular Disease,       Sciatica  Weakness of right lower extremity    Osteoarthritis  Peripheral Neuropathy                          Endocrine:  Diabetes, well controlled, type 2           Dermatological:  Skin Normal    Psych:  Psychiatric Normal                    Physical Exam  General: Well nourished, Cooperative, Alert and Oriented    Airway:  Mallampati: III / II  Mouth Opening: Normal  TM Distance: > 6 cm  Tongue: Normal  Neck ROM: Normal ROM    Dental:  Intact, Caps / Implants    Chest/Lungs:  Clear to auscultation, Normal Respiratory Rate    Heart:  Rate: Normal  Rhythm: Regular Rhythm        Anesthesia Plan  Type of Anesthesia, risks & benefits discussed:    Anesthesia Type: Gen Natural Airway  Intra-op Monitoring Plan: Standard ASA Monitors  Post Op Pain Control Plan:   (medical reason for not using multimodal pain management)  Induction:  IV  Airway Plan: Direct and Video, Post-Induction  Informed Consent:  Informed consent signed with the Patient and all parties understand the risks and agree with anesthesia plan.  All questions answered. Patient consented to blood products? Yes  ASA Score: 3 Emergent  Anesthesia Plan Notes:     GNA  Propofol  POM  Zofran     Ready For Surgery From Anesthesia Perspective.     .

## 2024-10-10 ENCOUNTER — ANESTHESIA (OUTPATIENT)
Dept: CARDIOLOGY | Facility: HOSPITAL | Age: 84
End: 2024-10-10
Payer: MEDICARE

## 2024-10-10 ENCOUNTER — CLINICAL SUPPORT (OUTPATIENT)
Dept: CARDIOLOGY | Facility: HOSPITAL | Age: 84
End: 2024-10-10
Attending: STUDENT IN AN ORGANIZED HEALTH CARE EDUCATION/TRAINING PROGRAM
Payer: MEDICARE

## 2024-10-10 ENCOUNTER — ANESTHESIA EVENT (OUTPATIENT)
Dept: CARDIOLOGY | Facility: HOSPITAL | Age: 84
End: 2024-10-10
Payer: MEDICARE

## 2024-10-10 VITALS
RESPIRATION RATE: 18 BRPM | HEIGHT: 64 IN | WEIGHT: 121.06 LBS | SYSTOLIC BLOOD PRESSURE: 101 MMHG | BODY MASS INDEX: 20.67 KG/M2 | HEART RATE: 74 BPM | OXYGEN SATURATION: 100 % | DIASTOLIC BLOOD PRESSURE: 53 MMHG

## 2024-10-10 LAB
ALBUMIN SERPL BCP-MCNC: 2.7 G/DL (ref 3.5–5.2)
ALP SERPL-CCNC: 51 U/L (ref 55–135)
ALT SERPL W/O P-5'-P-CCNC: 18 U/L (ref 10–44)
ANION GAP SERPL CALC-SCNC: 9 MMOL/L (ref 8–16)
AST SERPL-CCNC: 18 U/L (ref 10–40)
BASOPHILS # BLD AUTO: 0.01 K/UL (ref 0–0.2)
BASOPHILS NFR BLD: 0.3 % (ref 0–1.9)
BILIRUB SERPL-MCNC: 0.4 MG/DL (ref 0.1–1)
BUN SERPL-MCNC: 29 MG/DL (ref 8–23)
CALCIUM SERPL-MCNC: 8.5 MG/DL (ref 8.7–10.5)
CHLORIDE SERPL-SCNC: 97 MMOL/L (ref 95–110)
CO2 SERPL-SCNC: 30 MMOL/L (ref 23–29)
CREAT SERPL-MCNC: 1.2 MG/DL (ref 0.5–1.4)
DIFFERENTIAL METHOD BLD: ABNORMAL
EOSINOPHIL # BLD AUTO: 0 K/UL (ref 0–0.5)
EOSINOPHIL NFR BLD: 0.3 % (ref 0–8)
ERYTHROCYTE [DISTWIDTH] IN BLOOD BY AUTOMATED COUNT: 17.9 % (ref 11.5–14.5)
EST. GFR  (NO RACE VARIABLE): 44.9 ML/MIN/1.73 M^2
GLUCOSE SERPL-MCNC: 158 MG/DL (ref 70–110)
HCT VFR BLD AUTO: 28.8 % (ref 37–48.5)
HGB BLD-MCNC: 8.9 G/DL (ref 12–16)
IMM GRANULOCYTES # BLD AUTO: 0.02 K/UL (ref 0–0.04)
IMM GRANULOCYTES NFR BLD AUTO: 0.6 % (ref 0–0.5)
LYMPHOCYTES # BLD AUTO: 0.4 K/UL (ref 1–4.8)
LYMPHOCYTES NFR BLD: 13.2 % (ref 18–48)
MAGNESIUM SERPL-MCNC: 1.8 MG/DL (ref 1.6–2.6)
MCH RBC QN AUTO: 31.4 PG (ref 27–31)
MCHC RBC AUTO-ENTMCNC: 30.9 G/DL (ref 32–36)
MCV RBC AUTO: 102 FL (ref 82–98)
MONOCYTES # BLD AUTO: 0.8 K/UL (ref 0.3–1)
MONOCYTES NFR BLD: 23.3 % (ref 4–15)
NEUTROPHILS # BLD AUTO: 2 K/UL (ref 1.8–7.7)
NEUTROPHILS NFR BLD: 62.3 % (ref 38–73)
NRBC BLD-RTO: 0 /100 WBC
OHS QRS DURATION: 66 MS
OHS QTC CALCULATION: 462 MS
PLATELET # BLD AUTO: 102 K/UL (ref 150–450)
PMV BLD AUTO: 10 FL (ref 9.2–12.9)
POTASSIUM SERPL-SCNC: 4.7 MMOL/L (ref 3.5–5.1)
PROT SERPL-MCNC: 5.9 G/DL (ref 6–8.4)
RBC # BLD AUTO: 2.83 M/UL (ref 4–5.4)
SODIUM SERPL-SCNC: 136 MMOL/L (ref 136–145)
WBC # BLD AUTO: 3.26 K/UL (ref 3.9–12.7)

## 2024-10-10 PROCEDURE — 36415 COLL VENOUS BLD VENIPUNCTURE: CPT | Performed by: NURSE PRACTITIONER

## 2024-10-10 PROCEDURE — 83735 ASSAY OF MAGNESIUM: CPT | Performed by: FAMILY MEDICINE

## 2024-10-10 PROCEDURE — 92960 CARDIOVERSION ELECTRIC EXT: CPT

## 2024-10-10 PROCEDURE — 63600175 PHARM REV CODE 636 W HCPCS

## 2024-10-10 PROCEDURE — 37000008 HC ANESTHESIA 1ST 15 MINUTES: Performed by: ANESTHESIOLOGY

## 2024-10-10 PROCEDURE — 25000003 PHARM REV CODE 250

## 2024-10-10 PROCEDURE — 5A2204Z RESTORATION OF CARDIAC RHYTHM, SINGLE: ICD-10-PCS | Performed by: INTERNAL MEDICINE

## 2024-10-10 PROCEDURE — 99900031 HC PATIENT EDUCATION (STAT)

## 2024-10-10 PROCEDURE — 25000003 PHARM REV CODE 250: Performed by: FAMILY MEDICINE

## 2024-10-10 PROCEDURE — B24BZZ4 ULTRASONOGRAPHY OF HEART WITH AORTA, TRANSESOPHAGEAL: ICD-10-PCS | Performed by: INTERNAL MEDICINE

## 2024-10-10 PROCEDURE — 25000003 PHARM REV CODE 250: Performed by: NURSE PRACTITIONER

## 2024-10-10 PROCEDURE — 99233 SBSQ HOSP IP/OBS HIGH 50: CPT | Mod: ,,, | Performed by: STUDENT IN AN ORGANIZED HEALTH CARE EDUCATION/TRAINING PROGRAM

## 2024-10-10 PROCEDURE — 27000221 HC OXYGEN, UP TO 24 HOURS

## 2024-10-10 PROCEDURE — 37000009 HC ANESTHESIA EA ADD 15 MINS: Performed by: ANESTHESIOLOGY

## 2024-10-10 PROCEDURE — 99900035 HC TECH TIME PER 15 MIN (STAT)

## 2024-10-10 PROCEDURE — 99233 SBSQ HOSP IP/OBS HIGH 50: CPT | Mod: ,,, | Performed by: INTERNAL MEDICINE

## 2024-10-10 PROCEDURE — 21000000 HC CCU ICU ROOM CHARGE

## 2024-10-10 PROCEDURE — 80053 COMPREHEN METABOLIC PANEL: CPT | Performed by: NURSE PRACTITIONER

## 2024-10-10 PROCEDURE — 25000003 PHARM REV CODE 250: Performed by: HOSPITALIST

## 2024-10-10 PROCEDURE — 63600175 PHARM REV CODE 636 W HCPCS: Performed by: NURSE PRACTITIONER

## 2024-10-10 PROCEDURE — 94761 N-INVAS EAR/PLS OXIMETRY MLT: CPT

## 2024-10-10 PROCEDURE — 85025 COMPLETE CBC W/AUTO DIFF WBC: CPT | Performed by: NURSE PRACTITIONER

## 2024-10-10 PROCEDURE — C8925 2D TEE W OR W/O FOL W/CON,IN: HCPCS

## 2024-10-10 RX ORDER — AMIODARONE HYDROCHLORIDE 200 MG/1
200 TABLET ORAL 2 TIMES DAILY
Status: DISCONTINUED | OUTPATIENT
Start: 2024-10-10 | End: 2024-10-11 | Stop reason: HOSPADM

## 2024-10-10 RX ORDER — EPINEPHRINE 0.1 MG/ML
INJECTION INTRAVENOUS
Status: DISCONTINUED
Start: 2024-10-10 | End: 2024-10-10 | Stop reason: WASHOUT

## 2024-10-10 RX ORDER — LIDOCAINE HYDROCHLORIDE 20 MG/ML
INJECTION, SOLUTION EPIDURAL; INFILTRATION; INTRACAUDAL; PERINEURAL
Status: DISCONTINUED | OUTPATIENT
Start: 2024-10-10 | End: 2024-10-10

## 2024-10-10 RX ORDER — AMOXICILLIN 250 MG
1 CAPSULE ORAL DAILY PRN
Status: DISCONTINUED | OUTPATIENT
Start: 2024-10-10 | End: 2024-10-11 | Stop reason: HOSPADM

## 2024-10-10 RX ORDER — ATROPINE SULFATE 0.1 MG/ML
INJECTION INTRAVENOUS
Status: DISCONTINUED
Start: 2024-10-10 | End: 2024-10-10 | Stop reason: WASHOUT

## 2024-10-10 RX ORDER — PHENYLEPHRINE HYDROCHLORIDE 10 MG/ML
INJECTION INTRAVENOUS
Status: DISCONTINUED | OUTPATIENT
Start: 2024-10-10 | End: 2024-10-10

## 2024-10-10 RX ORDER — PROPOFOL 10 MG/ML
VIAL (ML) INTRAVENOUS
Status: DISCONTINUED | OUTPATIENT
Start: 2024-10-10 | End: 2024-10-10

## 2024-10-10 RX ADMIN — DEXTROSE MONOHYDRATE 10 MG/HR: 50 INJECTION, SOLUTION INTRAVENOUS at 09:10

## 2024-10-10 RX ADMIN — APIXABAN 5 MG: 5 TABLET, FILM COATED ORAL at 08:10

## 2024-10-10 RX ADMIN — SODIUM CHLORIDE: 0.9 INJECTION, SOLUTION INTRAVENOUS at 10:10

## 2024-10-10 RX ADMIN — APIXABAN 2.5 MG: 2.5 TABLET, FILM COATED ORAL at 08:10

## 2024-10-10 RX ADMIN — AMIODARONE HYDROCHLORIDE 200 MG: 200 TABLET ORAL at 08:10

## 2024-10-10 RX ADMIN — Medication 800 MG: at 06:10

## 2024-10-10 RX ADMIN — PROPOFOL 20 MG: 10 INJECTION, EMULSION INTRAVENOUS at 10:10

## 2024-10-10 RX ADMIN — PROPOFOL 60 MG: 10 INJECTION, EMULSION INTRAVENOUS at 10:10

## 2024-10-10 RX ADMIN — MUPIROCIN 1 G: 20 OINTMENT TOPICAL at 08:10

## 2024-10-10 RX ADMIN — AMIODARONE HYDROCHLORIDE 0.5 MG/MIN: 1.8 INJECTION, SOLUTION INTRAVENOUS at 05:10

## 2024-10-10 RX ADMIN — FOLIC ACID 1 MG: 1 TABLET ORAL at 08:10

## 2024-10-10 RX ADMIN — ACETAMINOPHEN 650 MG: 325 TABLET ORAL at 06:10

## 2024-10-10 RX ADMIN — PHENYLEPHRINE HYDROCHLORIDE 150 MCG: 10 INJECTION INTRAVENOUS at 11:10

## 2024-10-10 RX ADMIN — Medication 400 MG: at 08:10

## 2024-10-10 RX ADMIN — GABAPENTIN 100 MG: 100 CAPSULE ORAL at 08:10

## 2024-10-10 RX ADMIN — PHENYLEPHRINE HYDROCHLORIDE 100 MCG: 10 INJECTION INTRAVENOUS at 11:10

## 2024-10-10 RX ADMIN — LIDOCAINE HYDROCHLORIDE 60 MG: 20 INJECTION, SOLUTION INTRAVENOUS at 10:10

## 2024-10-10 NOTE — PROGRESS NOTES
[facility] Medicine  Progress Note    Patient Name: Alexa Otero  MRN: [id number]  Patient Class: IP- Inpatient   Admission Date: 10/7/2024  Length of Stay: 3 days  Attending Physician: Karine Goodrich DO  Primary Care Provider: Idalia Greco MD        Subjective:     Principal Problem:Bilateral pneumonia        HPI:  84-year-old female who presented to the ED from outpatient clinic via EMS with reports of abdominal pain, N/V/D x3 days found to be in atrial fib with RVR.  Patient reports that she has stage IV lung cancer and went to her clinic for follow up.  She states that she was having and has had severe abdominal pain with NVD since her last chemotherapy.  Patient denies any feelings of chest palpitations.  Had discussion with patient regarding being placed on a ventilator or having chest compressions. She stated that she did not want to have those things done and she was made a DNR in the system.     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  Loculated right pleural effusion similar to prior.  New left pleural effusion and worsening diffuse bilateral airspace opacities interlobular septal thickening concerning for progression of malignancy or superimposed pneumonia.  New mesenteric stranding worse along the ascending colon.  Could be volume overload or infectious/inflammatory colitis.  Severe aortic atheromatous plaque.  Blood work performed in ED BUN 28, WBCs 3.29, hemoglobin 10.1, hematocrit 33.8, platelets 171, .  Initial troponin 13.3    Overview/Hospital Course:  The patient was admitted and placed on a cardizem drip. Echo and cardiology consult were ordered. Her home beta blocker was restarted. She was seen by pulmonology, who did not feel patient has pneumonia, and stopped abx. Pulmonology felt pleural effusion was due to afib with RVR. One dose of IV lasix was given. Her kidney function was monitored.  Seen by Cardiology and loading dose of  amiodarone started in addition to weaning diltiazem drip.  Plan for VASILIY/CV 10/10.  Kidney function is improving.    Interval History: Patient states she feels a little better.    Review of Systems   Constitutional:  Negative for activity change, appetite change, chills and fever.   HENT:  Negative for congestion, ear pain, nosebleeds and sinus pain.    Eyes:  Negative for discharge and itching.   Respiratory:  Negative for apnea, cough, chest tightness and shortness of breath.    Cardiovascular:  Negative for chest pain, palpitations and leg swelling.   Gastrointestinal:  Positive for abdominal pain. Negative for abdominal distention, diarrhea, nausea and vomiting.   Genitourinary:  Negative for difficulty urinating, dysuria, flank pain and frequency.   Musculoskeletal:  Negative for arthralgias, back pain, joint swelling and myalgias.   Skin:  Negative for color change, pallor and rash.   Neurological:  Negative for dizziness, weakness, light-headedness and headaches.   Psychiatric/Behavioral:  Negative for agitation, behavioral problems, confusion and suicidal ideas.      Objective:     Vital Signs (Most Recent):  Temp: 98 °F (36.7 °C) (10/10/24 0701)  Pulse: 83 (10/10/24 1500)  Resp: 16 (10/10/24 1500)  BP: 128/60 (10/10/24 1500)  SpO2: 98 % (10/10/24 1500) Vital Signs (24h Range):  Temp:  [98 °F (36.7 °C)-98.7 °F (37.1 °C)] 98 °F (36.7 °C)  Pulse:  [] 83  Resp:  [9-21] 16  SpO2:  [92 %-100 %] 98 %  BP: ()/(51-66) 128/60  FiO2 (%):  [85 %-100 %] 91 %     Weight: 54.9 kg (121 lb 0.5 oz)  Body mass index is 20.78 kg/m².    Intake/Output Summary (Last 24 hours) at 10/10/2024 1607  Last data filed at 10/10/2024 1300  Gross per 24 hour   Intake 1070 ml   Output 775 ml   Net 295 ml         Physical Exam  Vitals reviewed.   Constitutional:       General: She is not in acute distress.     Appearance: Normal appearance. She is normal weight. She is not ill-appearing.      Comments: Cachectic   HENT:      Head:  Normocephalic and atraumatic.      Nose: Nose normal.      Mouth/Throat:      Mouth: Mucous membranes are moist.      Pharynx: Oropharynx is clear.   Eyes:      General: No scleral icterus.     Extraocular Movements: Extraocular movements intact.      Conjunctiva/sclera: Conjunctivae normal.      Pupils: Pupils are equal, round, and reactive to light.   Cardiovascular:      Rate and Rhythm: Tachycardia present. Rhythm irregular.      Pulses: Normal pulses.      Heart sounds: Normal heart sounds. No murmur heard.     No gallop.   Pulmonary:      Effort: Pulmonary effort is normal. No respiratory distress.      Breath sounds: No wheezing, rhonchi or rales.      Comments: Decreased air movement bases  Chest:      Chest wall: No tenderness.   Abdominal:      General: Abdomen is flat. There is no distension.      Palpations: Abdomen is soft.      Tenderness: There is abdominal tenderness.   Musculoskeletal:         General: No swelling or tenderness.      Cervical back: Normal range of motion and neck supple. No rigidity or tenderness.      Right lower leg: No edema.      Left lower leg: No edema.   Skin:     General: Skin is warm and dry.   Neurological:      General: No focal deficit present.      Mental Status: She is alert and oriented to person, place, and time. Mental status is at baseline.      Motor: No weakness.   Psychiatric:         Mood and Affect: Mood normal.         Behavior: Behavior normal.         Thought Content: Thought content normal.             Significant Labs: All pertinent labs within the past 24 hours have been reviewed.  Recent Lab Results         10/10/24  0354        Albumin 2.7       ALP 51       ALT 18       Anion Gap 9       AST 18       Baso # 0.01       Basophil % 0.3       BILIRUBIN TOTAL 0.4  Comment: For infants and newborns, interpretation of results should be based  on gestational age, weight and in agreement with clinical  observations.    Premature Infant recommended reference  ranges:  Up to 24 hours.............<8.0 mg/dL  Up to 48 hours............<12.0 mg/dL  3-5 days..................<15.0 mg/dL  6-29 days.................<15.0 mg/dL         BUN 29       Calcium 8.5       Chloride 97       CO2 30       Creatinine 1.2       Differential Method Automated       eGFR 44.9       Eos # 0.0       Eos % 0.3       Glucose 158       Gran # (ANC) 2.0       Gran % 62.3       Hematocrit 28.8       Hemoglobin 8.9       Immature Grans (Abs) 0.02  Comment: Mild elevation in immature granulocytes is non specific and   can be seen in a variety of conditions including stress response,   acute inflammation, trauma and pregnancy. Correlation with other   laboratory and clinical findings is essential.         Immature Granulocytes 0.6       Lymph # 0.4       Lymph % 13.2       Magnesium  1.8       MCH 31.4       MCHC 30.9              Mono # 0.8       Mono % 23.3       MPV 10.0       nRBC 0       Platelet Count 102       Potassium 4.7       PROTEIN TOTAL 5.9       RBC 2.83       RDW 17.9       Sodium 136       WBC 3.26               Significant Imaging: I have reviewed all pertinent imaging results/findings within the past 24 hours.  Imaging Results              X-Ray Chest AP Portable (Final result)  Result time 10/07/24 19:05:03      Final result by Virgil Gongora MD (10/07/24 19:05:03)                   Impression:      Abnormal chest radiograph as above.      Electronically signed by: Virgil Gongora MD  Date:    10/07/2024  Time:    19:05               Narrative:    EXAMINATION:  XR CHEST AP PORTABLE    CLINICAL HISTORY:  Sepsis;    TECHNIQUE:  Single frontal view of the chest was performed.    COMPARISON:  06/19/2024    FINDINGS:  There is a right-sided MediPort catheter in similar position.  Cardiac monitoring leads overlie the chest.  There is stable mild rightward deviation of the trachea.  Cardiac silhouette is stable in size and configuration.  There is aortic atherosclerosis.  There  is a large loculated right-sided pleural effusion.  There are diffuse patchy airspace opacities throughout the aerated right lung as well as the left lung which may reflect edema, aspiration, or multifocal infection.  No large volume of left-sided pleural fluid appreciated radiographically.  There are bilateral calcified breast prosthesis present.  No definite pneumothorax identified.  Osseous structures demonstrate degenerative changes.                                        CTA Chest Abdomen Pelvis (Final result)  Result time 10/07/24 19:09:26      Final result by Joycelyn Taveras MD (10/07/24 19:09:26)                   Impression:      Postsurgical changes of right lower lobectomy.  Loculated right pleural effusion similar to prior.  New left pleural effusion and worsening diffuse bilateral airspace opacities interlobular septal thickening, concerning for progression of malignancy and or superimposed pneumonia.    New mesenteric stranding worse along the ascending colon.  Findings could be due to volume overload or infectious/inflammatory colitis.    Severe aortic atheromatous plaque.  No aortic aneurysm or dissection.  The renal and mesenteric arteries are patent.    This report was flagged in Epic as abnormal.      Electronically signed by: Joycelyn Taveras  Date:    10/07/2024  Time:    19:09               Narrative:    EXAMINATION:  CTA CHEST ABDOMEN PELVIS    CLINICAL HISTORY:  mesenteric ischemia eval vs aortic eval;    TECHNIQUE:  Low dose axial images, sagittal and coronal reformations were obtained from the thoracic inlet through the pelvis following the IV administration of 100 mL of Omnipaque 350 .  No oral contrast was administered.    COMPARISON:  Chest radiograph 06/19/2024, CT chest abdomen pelvis 12/10/2023    FINDINGS:  Cardiomegaly.  No pericardial effusion.  No thoracic aortic aneurysm.  No acute pulmonary embolus.    No enlarged axillary or mediastinal nodes.  No enlarged hilar lymph  nodes.    Loculated right pleural effusion similar to prior.  New small left pleural effusion.  Basilar predominant consolidation.  Central airways are clear.  Postsurgical changes of right lower lobectomy.  Diffuse ground-glass opacities, interlobular septal thickening and bronchial wall thickening increased compared to prior exam and now involving the left lung.  Bronchocentric consolidation in the right upper lobe.  Pulmonary emphysema.    Liver is normal in morphology and with smooth contour.  Stable right hepatic lobe cyst.    The spleen, pancreas, adrenal glands and kidneys are normal.  No intra or extrahepatic biliary ductal dilatation.  The gallbladder is surgically absent.  Right renal cysts better evaluated on prior CT.    Abdominal aorta with severe atherosclerotic calcification..  No aortic aneurysm or dissection.  The renal arteries and mesenteric arteries are patent.  No enlarged retroperitoneal lymph nodes.    Colonic diverticula.  New mesenteric stranding, worse about the ascending colon.  No bowel dilatation.  No organized fluid collection or free air.    Bladder is smooth walled.  Uterus is absent.  No enlarged pelvic lymph nodes.    Postsurgical changes of right femur ORIF with hardware intact.  Dextroscoliosis of the lumbar spine.  Bilateral breast implants in place.                                        Assessment/Plan:      * Bilateral pneumonia  Ruled out by pulmonology      Malignant neoplasm of right lung stage 4  Noted         Generalized abdominal pain  With c/o N/V/D since her last treatment  Zofran PRN   No diarrhea currently  Reports slightly better at this time  Morphine 1 mg IV q4h prn     HTN (hypertension)  Patients blood pressure range in the last 24 hours was: BP  Min: 124/70  Max: 152/89.The patient's inpatient anti-hypertensive regimen is listed below:  Current Antihypertensives  lisinopriL tablet 40 mg, Daily, Oral  metoprolol succinate (TOPROL-XL) 24 hr tablet 25 mg, Daily,  Oral  diltiaZEM injection 13.5 mg, Once, Intravenous  diltiaZEM 100 mg in dextrose 5% 100 mL IVPB (ready to mix) (titrating), Continuous, Intravenous    Plan  - BP is uncontrolled, will adjust as follows:  See list above holding lisinopril and metoprolol today we will restart p.o. metoprolol in a.m. patient placed on Cardizem drip    Bilateral pleural effusion  Supplemental oxygen     May improve with conversion of atrial fibrillation   One dose of IV lasix given    Oxygen dependent  Continue oxygen as needed      Atrial fibrillation  Patient has paroxysmal (<7 days) atrial fibrillation. Patient is currently in atrial fibrillation. [id number] Score: 5. The patients heart rate in the last 24 hours is as follows:  Pulse  Min: 91  Max: 122     Antiarrhythmics  diltiaZEM 100 mg in dextrose 5% 100 mL IVPB (ready to mix) (titrating), Continuous, Intravenous  amiodarone 360 mg/200 mL (1.8 mg/mL) infusion, Continuous, Intravenous  amiodarone 360 mg/200 mL (1.8 mg/mL) infusion, Continuous, Intravenous    Anticoagulants  apixaban tablet 5 mg, 2 times daily, Oral    Plan  - Replete lytes with a goal of K>4, Mg >2  - Patient is anticoagulated, see medications listed above.  - still requiring diltiazem drip, seen by Cardiology and added loading dose of amiodarone and continue to wean drip.  Plan for VASILIY/CV 10/10.        ACP (advance care planning)  Discussed code status with the patient she does not wish to be on the ventilator or have chest compressions.  She was changed to a DNR      CKD stage 3a, GFR 45-59 ml/min  Creatine stable for now. BMP reviewed- noted Estimated Creatinine Clearance: 30.7 mL/min (based on SCr of 1.2 mg/dL). according to latest data. Based on current GFR, CKD stage is stage 3 - GFR 30-59.  Monitor UOP and serial BMP and adjust therapy as needed. Renally dose meds. Avoid nephrotoxic medications and procedures.     Most Recent Today 11 d ago 12 d ago 2 wk ago 3 wk ago 1 mo ago 1 mo ago   CHEM PROFILE   BUN  28 High  (Today) 28 High  20 23 28 High  30 High  21 24 High    Creatinine 1.2 (Today) 1.2 1.2 1.3 1.3 1.2 1.1 1.1   eGFR if non  35.3 Abnormal   (2 yr ago)          eGFR if African American 40.6 Abnormal  (2 yr ago)                VTE Risk Mitigation (From admission, onward)           Ordered     apixaban tablet 2.5 mg  2 times daily         10/10/24 1455     Reason for No Pharmacological VTE Prophylaxis  Once        Question:  Reasons:  Answer:  Already adequately anticoagulated on oral Anticoagulants    10/07/24 2029     IP VTE HIGH RISK PATIENT  Once         10/07/24 2029     Place sequential compression device  Until discontinued         10/07/24 2029                    Discharge Planning   ALYCIA: 10/11/2024     Code Status: DNR   Is the patient medically ready for discharge?:     Reason for patient still in hospital (select all that apply): Treatment, Consult recommendations, and Pending disposition  Discharge Plan A: Home with family                  Karine Goodrich DO  Department of Hospital Medicine   [facility]

## 2024-10-10 NOTE — PROGRESS NOTES
Pulmonary/Critical Care Progress Note      PATIENT NAME: Alexa Otero  MRN: [id number]  TODAY'S DATE: 10/10/2024  9:41 AM  ADMIT DATE: 10/7/2024  AGE: 84 y.o. 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  Two weeks ago, shortly after her treatment, she developed severe abdominal pain which is precluding her from eating.  She has become very weak. She has been in paroxysmal A fib at least since  when she was supposed to follow up with her electrophysiologist but hasn't.  I had restarted her amiodarone and eliquis.  Today, she is still in A fib c RVR. Her CXR shows a new L effusion and increased ground glass markings bilaterally.  This is probably due to the AFib with RVR as opposed to a pneumonia which she has no symptoms of or progression of her disease which her markedly improved PET scan of 10/1 would argue against.  The patient occasionally produces sputum but much less now that her cancer has been treated.  She is not produced any purulent sputum.  Will try to get a sputum culture just for completeness sake.    10/9 the patient is tachycardic in the 120s.  She remains in AFib.  She is on 15 mg of Cardizem.  Her appointment with her electrophysiologist is not till .    10/10- doing ok today, alert and awake, plans for cardioversion this am     REVIEW OF SYSTEMS  GENERAL: Feeling weak  EYES: Vision is good.  ENT: No sinusitis or pharyngitis.   HEART:  She can not feel her atrial fib  LUNGS:  She is coughing up some clear mucus  GI:  The severe abdominal pain she has had for 2 weeks has relented.  She had 1 large diarrheal stool.  She vomited everything she had eaten on .  : No dysuria, hesitancy, or nocturia.  SKIN: No lesions or rashes.  MUSCULOSKELETAL:  Her  is having to help her stand up because she was  so weak.  NEURO: No headaches or neuropathy.  LYMPH: No edema or adenopathy.  PSYCH: No anxiety or depression.  ENDO: No weight change.    No change in the patient's Past Medical History, Past Surgical History, Social History or Family History since admission.      VITAL SIGNS (MOST RECENT)  Temp: 98 °F (36.7 °C) (10/10/24 0701)  Pulse: 70 (10/10/24 1113)  Resp: 18 (10/10/24 1113)  BP: (!) 95/51 (10/10/24 1113)  SpO2: 100 % (10/10/24 1113)    INTAKE AND OUTPUT (LAST 24 HOURS):  Intake/Output Summary (Last 24 hours) at 10/10/2024 1301  Last data filed at 10/10/2024 1108  Gross per 24 hour   Intake 730 ml   Output 775 ml   Net -45 ml       WEIGHT  Wt Readings from Last 1 Encounters:   10/09/24 54.9 kg (121 lb 0.5 oz)       PHYSICAL EXAM  GENERAL: Older patient in no distress.  HEENT: Pupils equal and reactive. Extraocular movements intact. Nose intact. Pharynx moist.  NECK: Supple.   HEART:  Irregularly irregular and tachycardic rate and rhythm. No murmur or gallop auscultated.  LUNGS:  The right lung has consolidated breath sounds and decreased breath sounds throughout.  There are much fewer crackles in the left base.  Lung excursion symmetrical. No change in fremitus.   ABDOMEN: Bowel sounds present. Non-tender, no masses palpated.  : Normal anatomy.  EXTREMITIES: Normal muscle tone and joint movement, no cyanosis or clubbing.   LYMPHATICS: No adenopathy palpated, no edema.  SKIN: Dry, intact, no lesions.   NEURO: Cranial nerves II-XII intact. Motor strength 5/5 bilaterally, upper and lower extremities.  PSYCH: Appropriate affect      CBC LAST (LAST 24 HOURS)  Recent Labs   Lab 10/10/24  0354   WBC 3.26*   RBC 2.83*   HGB 8.9*   HCT 28.8*   *   MCH 31.4*   MCHC 30.9*   RDW 17.9*   *   MPV 10.0   GRAN 62.3  2.0   LYMPH 13.2*  0.4*   MONO 23.3*  0.8   BASO 0.01   NRBC 0       CHEMISTRY LAST (LAST 24 HOURS)  Recent Labs   Lab 10/10/24  0354      K 4.7   CL 97   CO2 30*   ANIONGAP 9   BUN 29*    CREATININE 1.2   *   CALCIUM 8.5*   MG 1.8   ALBUMIN 2.7*   PROT 5.9*   ALKPHOS 51*   ALT 18   AST 18   BILITOT 0.4         CARDIAC PROFILE (LAST 24 HOURS)  Recent Labs   Lab 10/07/24  1713   *   TROPONINIHS 13.3       LAST 7 DAYS MICROBIOLOGY   Microbiology Results (last 7 days)       Procedure Component Value Units Date/Time    Blood culture x two cultures. Draw prior to antibiotics. [[id number]] Collected: 10/07/24 1753    Order Status: Completed Specimen: Blood from Peripheral, Hand, Left Updated: 10/09/24 2032     Blood Culture, Routine No Growth to date      No Growth to date      No Growth to date    Narrative:      Aerobic and anaerobic    Blood culture x two cultures. Draw prior to antibiotics. [[id number]] Collected: 10/07/24 1758    Order Status: Completed Specimen: Blood from Peripheral, Hand, Right Updated: 10/09/24 2032     Blood Culture, Routine No Growth to date      No Growth to date      No Growth to date    Narrative:      Aerobic and anaerobic    Respiratory Infection Panel (PCR), Nasopharyngeal [[id number]] Collected: 10/07/24 2135    Order Status: Completed Specimen: Nasopharyngeal Swab Updated: 10/07/24 2323     Respiratory Infection Panel Source NP swab     Adenovirus Not Detected     Coronavirus 229E, Common Cold Virus Not Detected     Coronavirus HKU1, Common Cold Virus Not Detected     Coronavirus NL63, Common Cold Virus Not Detected     Coronavirus OC43, Common Cold Virus Not Detected     Comment: Coronavirus strains 229E, HKU1, NL63, and OC43 can cause the common   cold   and are not associated with the respiratory disease outbreak caused   by  the COVID-19 (SARS-CoV-2 novel Coronavirus) strain.           SARS-CoV2 (COVID-19) Qualitative PCR Not Detected     Human Metapneumovirus Not Detected     Human Rhinovirus/Enterovirus Not Detected     Influenza A (subtypes H1, H1-2009,H3) Not Detected     Influenza B Not Detected     Parainfluenza Virus 1 Not Detected      Parainfluenza Virus 2 Not Detected     Parainfluenza Virus 3 Not Detected     Parainfluenza Virus 4 Not Detected     Respiratory Syncytial Virus Not Detected     Bordetella Parapertussis (DV1872) Not Detected     Bordetella pertussis (ptxP) Not Detected     Chlamydia pneumoniae Not Detected     Mycoplasma pneumoniae Not Detected     Comment: Respiratory Infection Panel testing performed by Multiplex PCR.       Narrative:      Respiratory Infection Panel source->NP Swab            MOST RECENT IMAGING  X-Ray Chest AP Portable  Narrative: EXAMINATION:  XR CHEST AP PORTABLE    CLINICAL HISTORY:  L effusion and mild pulmonary edema;    FINDINGS:  Portable chest at 457 compared with 10/07/2024 shows unchanged cardiomediastinal silhouette. Right subclavian port catheter unchanged.    Pleural-parenchymal opacity affecting the right hemithorax, most prominent superiorly, is unchanged.  Linear opacities in right lower lung zone unchanged.  Reticulonodular opacities throughout the aerated right mid lower lung zone unchanged, with less prominent perihilar interstitial opacities in left lung unchanged.  Central pulmonary vasculature is unchanged.  No pneumothorax.  Degenerative changes affect the spine.  Right upper quadrant surgical clips unchanged.  Impression: No significant change.    Electronically signed by: Rei Carr  Date:    10/10/2024  Time:    07:17      CURRENT VISIT EKG  Results for orders placed or performed during the hospital encounter of 10/07/24   EKG 12-lead   Result Value Ref Range    QRS Duration 66 ms    [facility] QTC Calculation 453 ms    Narrative    Test Reason : R07.9,    Vent. Rate : 137 BPM     Atrial Rate : 000 BPM     P-R Int : 000 ms          QRS Dur : 066 ms      QT Int : 300 ms       P-R-T Axes : 000 009 090 degrees     QTc Int : 453 ms    Atrial fibrillation with rapid ventricular response  Anteroseptal infarct (cited on or before 13-OCT-2023)  Abnormal ECG  When compared with ECG of 07-OCT-2024  17:03,  No significant change was found    Referred By: [email or web address]   SELF           Confirmed By:        ECHOCARDIOGRAM RESULTS  Results for orders placed during the hospital encounter of 10/13/23    Echo    Interpretation Summary    Left Ventricle: The left ventricle is normal in size. Normal wall thickness. Normal wall motion. There is normal systolic function with a visually estimated ejection fraction of 55 - 60%. Grade I diastolic dysfunction.    Right Ventricle: Normal right ventricular cavity size. Wall thickness is normal. Right ventricle wall motion  is normal. Systolic function is normal.    Aortic Valve: There is moderate aortic valve sclerosis.    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 There is mild mitral annular calcification present. There is no stenosis. The mean pressure gradient across the mitral valve is 3 mmHg at a heart rate of  bpm. There is mild regurgitation.    Tricuspid Valve: There is mild regurgitation.  No pulmonary hypertension.    IVC/SVC: Normal venous pressure at 3 mmHg.        Oxygen  INFORMATION     1 L, the patient wears 2 at home         IMPRESSION AND PLAN  Mild pulmonary edema with new left pleural effusion  - secondary to AFib and moderate malnutrition  -   - Lasix  - continue hold antibiotics     Paroxysmal atrial fib  - present since at least 9 19  - patient was supposed to follow up with Dr. Smith, but has not, her appointment is 11/1  - was loaded on amio, on diltazem drip   - continue Eliquis  - plan for cardioversion today     Moderate malnutrition, moderate hypoalbuminemia  - has not been eating secondary to abdominal pain  Weakness  Lepidic pattern adenocarcinoma  - on Alimta  - improved PET scan  Anemia  - a bit better  Leukopenia  - ANC adequate  Thrombocytopenia  - trend    Reid Garrett MD  Date of Service: 10/10/2024  9:41 AM

## 2024-10-10 NOTE — CARE UPDATE
10/10/24 0815   Patient Assessment/Suction   Level of Consciousness (AVPU) alert   Respiratory Effort Normal;Unlabored   PRE-TX-O2   Device (Oxygen Therapy) nasal cannula   $ Is the patient on Low Flow Oxygen? Yes   Flow (L/min) (Oxygen Therapy) 2   SpO2 98 %   Pulse Oximetry Type Continuous   $ Pulse Oximetry - Multiple Charge Pulse Oximetry - Multiple   Pulse 103   Resp 19   Aerosol Therapy   $ Aerosol Therapy Charges PRN treatment not required   Education   $ Education Oxygen;15 min

## 2024-10-10 NOTE — CARE UPDATE
10/09/24 8421   Patient Assessment/Suction   Level of Consciousness (AVPU) alert   Respiratory Effort Unlabored   Expansion/Accessory Muscles/Retractions no use of accessory muscles   All Lung Fields Breath Sounds clear;diminished;equal bilaterally   Rhythm/Pattern, Respiratory no shortness of breath reported   Cough Frequency no cough   PRE-TX-O2   Device (Oxygen Therapy) nasal cannula   $ Is the patient on Low Flow Oxygen? Yes   Flow (L/min) (Oxygen Therapy) 1   SpO2 100 %   Pulse Oximetry Type Continuous   $ Pulse Oximetry - Multiple Charge Pulse Oximetry - Multiple   Pulse 94   Resp (!) 9   Positioning   Head of Bed (HOB) Positioning HOB elevated;HOB at 30 degrees   Aerosol Therapy   $ Aerosol Therapy Charges PRN treatment not required

## 2024-10-10 NOTE — NURSING
[facility]/Cardioversion    MD: Dr. Olson  Echo: Lety PHILIPPE: Garrett Gudino  RN: Kailey       Timeout 1058    Probe In: 1100  Probe Out: 1104    1105 120 J - Normal Sinus                                                        j

## 2024-10-10 NOTE — TRANSFER OF CARE
"Anesthesia Transfer of Care Note    Patient: Alexa Otero    Procedure(s) Performed: * No procedures listed *    Patient location: Telemetry/Step Down Unit    Anesthesia Type: general    Transport from OR: Transported from OR on room air with adequate spontaneous ventilation    Post pain: adequate analgesia    Post assessment: no apparent anesthetic complications and tolerated procedure well    Post vital signs: stable    Level of consciousness: sedated    Nausea/Vomiting: no nausea/vomiting    Complications: none    Transfer of care protocol was followed      Last vitals: Visit Vitals  /63   Pulse 107   Temp 36.7 °C (98 °F)   Resp 11   Ht 5' 4" (1.626 m)   Wt 54.9 kg (121 lb 0.5 oz)   SpO2 100%   Breastfeeding No   BMI 20.78 kg/m²     " No

## 2024-10-10 NOTE — ANESTHESIA PREPROCEDURE EVALUATION
10/10/2024  Alexa Otero is a 84 y.o., female.      Tobacco Use:  The patient  reports that she quit smoking about 43 years ago. Her smoking use included cigarettes. She started smoking about 63 years ago. She has a 20 pack-year smoking history. She has never used smokeless tobacco.     Results for orders placed or performed during the hospital encounter of 10/07/24   EKG 12-lead    Collection Time: 10/07/24  8:41 PM   Result Value Ref Range    QRS Duration 66 ms    [facility] QTC Calculation 453 ms    Narrative    Test Reason : R07.9,    Vent. Rate : 137 BPM     Atrial Rate : 000 BPM     P-R Int : 000 ms          QRS Dur : 066 ms      QT Int : 300 ms       P-R-T Axes : 000 009 090 degrees     QTc Int : 453 ms    Atrial fibrillation with rapid ventricular response  Anteroseptal infarct (cited on or before 13-OCT-2023)  Abnormal ECG  When compared with ECG of 07-OCT-2024 17:03,  No significant change was found    Referred By: [email or web address]   SELF           Confirmed By:                Lab Results   Component Value Date    WBC 3.26 (L) 10/10/2024    HGB 8.9 (L) 10/10/2024    HCT 28.8 (L) 10/10/2024     (H) 10/10/2024     (L) 10/10/2024     BMP  Lab Results   Component Value Date     10/10/2024    K 4.7 10/10/2024    CL 97 10/10/2024    CO2 30 (H) 10/10/2024    BUN 29 (H) 10/10/2024    CREATININE 1.2 10/10/2024    CALCIUM 8.5 (L) 10/10/2024    ANIONGAP 9 10/10/2024     (H) 10/10/2024     (H) 10/09/2024     10/08/2024       Results for orders placed during the hospital encounter of 10/13/23    Echo    Interpretation Summary    Left Ventricle: The left ventricle is normal in size. Normal wall thickness. Normal wall motion. There is normal systolic function with a visually estimated ejection fraction of 55 - 60%. Grade I diastolic dysfunction.    Right Ventricle: Normal right  ventricular cavity size. Wall thickness is normal. Right ventricle wall motion  is normal. Systolic function is normal.    Aortic Valve: There is moderate aortic valve sclerosis.    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 There is mild mitral annular calcification present. There is no stenosis. The mean pressure gradient across the mitral valve is 3 mmHg at a heart rate of  bpm. There is mild regurgitation.    Tricuspid Valve: There is mild regurgitation.  No pulmonary hypertension.    IVC/SVC: Normal venous pressure at 3 mmHg.     10/27/23 08:52   Group & Rh A POS   INDIRECT ELIAS NEG   Specimen Outdate 11/10/2023 23:59       Pre-op Assessment    I have reviewed the Patient Summary Reports.     I have reviewed the Nursing Notes. I have reviewed the NPO Status.   I have reviewed the Medications.     Review of Systems  Anesthesia Hx:  No problems with previous Anesthesia   History of prior surgery of interest to airway management or planning: lung surgery.         Denies Family Hx of Anesthesia complications.   Personal Hx of Anesthesia complications, Post-Operative Nausea/Vomiting, in the past, but not with recent anesthetics / prophylaxis                    Social:  Alcohol Use, Former Smoker       Hematology/Oncology:           --  Thrombocytopenia:            Hematology Comments: Plavix Therapy       --  Cancer in past history:              surgery and chemotherapy   Oncology Comments: Extra-ovarian endometrioid adenocarcinoma  Squamous cell carcinoma  Lepidic adenocarcinoma     EENT/Dental:  EENT/Dental Normal  Macular degeneration Eyes: Visual Impairment   Has Bilateral and S/P Extraction - Bilateral Catarract                  Cardiovascular:     Hypertension, poorly controlled   CAD  asymptomatic CABG/stent Dysrhythmias atrial fibrillation      hyperlipidemia   ECG has been reviewed. Hx of cardiac arrest secondary to ventricular fibrillation during cardiac stent placement     Patient  cleared for subsequent surgery by Dr. Julio                          Pulmonary:   COPD      History of chronic cough  Hx Aspiration pneumonia of right lung  Left Pleural Effusion   Home Oxygen Dependent 2 Liters / NC   Chronic Obstructive Pulmonary Disease (COPD):              Chest Tumor/Mass:    Lung Cancer, Adenocarcinoma         Renal/:  Chronic Renal Disease        Kidney Function/Disease, Chronic Kidney Disease (CKD) , CKD Stage III (GFR 30-59)            Hepatic/GI:     GERD, well controlled Liver Disease,  History of colon polyps  Pancreatic pseudocyst/cyst  Mid-epigastric pain   Patient denies N/V   Esophageal / Stomach Disorders Esophageal Disorder, Kwon's Esophagus        Pancreatic Disease   Musculoskeletal:  Arthritis      Joint Disease:  Arthritis, Osteoarthritis     Spine Disorders: lumbar Chronic Pain           Neurological:    Neuromuscular Disease,       Sciatica  Weakness of right lower extremity    Osteoarthritis  Peripheral Neuropathy                          Endocrine:  Diabetes, well controlled, type 2           Dermatological:  Skin Normal    Psych:  Psychiatric Normal                    Physical Exam  General: Well nourished, Cooperative, Alert and Oriented    Airway:  Mallampati: III / II  Mouth Opening: Normal  TM Distance: > 6 cm  Tongue: Normal  Neck ROM: Normal ROM    Dental:  Intact, Caps / Implants    Chest/Lungs:  Clear to auscultation, Normal Respiratory Rate    Heart:  Rate: Normal  Rhythm: Regular Rhythm        Anesthesia Plan  Type of Anesthesia, risks & benefits discussed:    Anesthesia Type: Gen Natural Airway  Intra-op Monitoring Plan: Standard ASA Monitors  Post Op Pain Control Plan:   (medical reason for not using multimodal pain management)  Induction:  IV  Airway Plan: Direct and Video, Post-Induction  Informed Consent: Informed consent signed with the Patient and all parties understand the risks and agree with anesthesia plan.  All questions answered. Patient  consented to blood products? Yes  ASA Score: 3 Emergent  Anesthesia Plan Notes: GNA  Propofol  POM      Ready For Surgery From Anesthesia Perspective.     .

## 2024-10-10 NOTE — SUBJECTIVE & OBJECTIVE
Interval History: Patient states she feels a little better.    Review of Systems   Constitutional:  Negative for activity change, appetite change, chills and fever.   HENT:  Negative for congestion, ear pain, nosebleeds and sinus pain.    Eyes:  Negative for discharge and itching.   Respiratory:  Negative for apnea, cough, chest tightness and shortness of breath.    Cardiovascular:  Negative for chest pain, palpitations and leg swelling.   Gastrointestinal:  Positive for abdominal pain. Negative for abdominal distention, diarrhea, nausea and vomiting.   Genitourinary:  Negative for difficulty urinating, dysuria, flank pain and frequency.   Musculoskeletal:  Negative for arthralgias, back pain, joint swelling and myalgias.   Skin:  Negative for color change, pallor and rash.   Neurological:  Negative for dizziness, weakness, light-headedness and headaches.   Psychiatric/Behavioral:  Negative for agitation, behavioral problems, confusion and suicidal ideas.      Objective:     Vital Signs (Most Recent):  Temp: 98 °F (36.7 °C) (10/10/24 0701)  Pulse: 83 (10/10/24 1500)  Resp: 16 (10/10/24 1500)  BP: 128/60 (10/10/24 1500)  SpO2: 98 % (10/10/24 1500) Vital Signs (24h Range):  Temp:  [98 °F (36.7 °C)-98.7 °F (37.1 °C)] 98 °F (36.7 °C)  Pulse:  [] 83  Resp:  [9-21] 16  SpO2:  [92 %-100 %] 98 %  BP: ()/(51-66) 128/60  FiO2 (%):  [85 %-100 %] 91 %     Weight: 54.9 kg (121 lb 0.5 oz)  Body mass index is 20.78 kg/m².    Intake/Output Summary (Last 24 hours) at 10/10/2024 1607  Last data filed at 10/10/2024 1300  Gross per 24 hour   Intake 1070 ml   Output 775 ml   Net 295 ml         Physical Exam  Vitals reviewed.   Constitutional:       General: She is not in acute distress.     Appearance: Normal appearance. She is normal weight. She is not ill-appearing.      Comments: Cachectic   HENT:      Head: Normocephalic and atraumatic.      Nose: Nose normal.      Mouth/Throat:      Mouth: Mucous membranes are moist.       Pharynx: Oropharynx is clear.   Eyes:      General: No scleral icterus.     Extraocular Movements: Extraocular movements intact.      Conjunctiva/sclera: Conjunctivae normal.      Pupils: Pupils are equal, round, and reactive to light.   Cardiovascular:      Rate and Rhythm: Tachycardia present. Rhythm irregular.      Pulses: Normal pulses.      Heart sounds: Normal heart sounds. No murmur heard.     No gallop.   Pulmonary:      Effort: Pulmonary effort is normal. No respiratory distress.      Breath sounds: No wheezing, rhonchi or rales.      Comments: Decreased air movement bases  Chest:      Chest wall: No tenderness.   Abdominal:      General: Abdomen is flat. There is no distension.      Palpations: Abdomen is soft.      Tenderness: There is abdominal tenderness.   Musculoskeletal:         General: No swelling or tenderness.      Cervical back: Normal range of motion and neck supple. No rigidity or tenderness.      Right lower leg: No edema.      Left lower leg: No edema.   Skin:     General: Skin is warm and dry.   Neurological:      General: No focal deficit present.      Mental Status: She is alert and oriented to person, place, and time. Mental status is at baseline.      Motor: No weakness.   Psychiatric:         Mood and Affect: Mood normal.         Behavior: Behavior normal.         Thought Content: Thought content normal.             Significant Labs: All pertinent labs within the past 24 hours have been reviewed.  Recent Lab Results         10/10/24  0354        Albumin 2.7       ALP 51       ALT 18       Anion Gap 9       AST 18       Baso # 0.01       Basophil % 0.3       BILIRUBIN TOTAL 0.4  Comment: For infants and newborns, interpretation of results should be based  on gestational age, weight and in agreement with clinical  observations.    Premature Infant recommended reference ranges:  Up to 24 hours.............<8.0 mg/dL  Up to 48 hours............<12.0 mg/dL  3-5 days..................<15.0  mg/dL  6-29 days.................<15.0 mg/dL         BUN 29       Calcium 8.5       Chloride 97       CO2 30       Creatinine 1.2       Differential Method Automated       eGFR 44.9       Eos # 0.0       Eos % 0.3       Glucose 158       Gran # (ANC) 2.0       Gran % 62.3       Hematocrit 28.8       Hemoglobin 8.9       Immature Grans (Abs) 0.02  Comment: Mild elevation in immature granulocytes is non specific and   can be seen in a variety of conditions including stress response,   acute inflammation, trauma and pregnancy. Correlation with other   laboratory and clinical findings is essential.         Immature Granulocytes 0.6       Lymph # 0.4       Lymph % 13.2       Magnesium  1.8       MCH 31.4       MCHC 30.9              Mono # 0.8       Mono % 23.3       MPV 10.0       nRBC 0       Platelet Count 102       Potassium 4.7       PROTEIN TOTAL 5.9       RBC 2.83       RDW 17.9       Sodium 136       WBC 3.26               Significant Imaging: I have reviewed all pertinent imaging results/findings within the past 24 hours.  Imaging Results              X-Ray Chest AP Portable (Final result)  Result time 10/07/24 19:05:03      Final result by Virgil Gongora MD (10/07/24 19:05:03)                   Impression:      Abnormal chest radiograph as above.      Electronically signed by: Virgil Gongora MD  Date:    10/07/2024  Time:    19:05               Narrative:    EXAMINATION:  XR CHEST AP PORTABLE    CLINICAL HISTORY:  Sepsis;    TECHNIQUE:  Single frontal view of the chest was performed.    COMPARISON:  06/19/2024    FINDINGS:  There is a right-sided MediPort catheter in similar position.  Cardiac monitoring leads overlie the chest.  There is stable mild rightward deviation of the trachea.  Cardiac silhouette is stable in size and configuration.  There is aortic atherosclerosis.  There is a large loculated right-sided pleural effusion.  There are diffuse patchy airspace opacities throughout the aerated  right lung as well as the left lung which may reflect edema, aspiration, or multifocal infection.  No large volume of left-sided pleural fluid appreciated radiographically.  There are bilateral calcified breast prosthesis present.  No definite pneumothorax identified.  Osseous structures demonstrate degenerative changes.                                        CTA Chest Abdomen Pelvis (Final result)  Result time 10/07/24 19:09:26      Final result by Joycelyn Taveras MD (10/07/24 19:09:26)                   Impression:      Postsurgical changes of right lower lobectomy.  Loculated right pleural effusion similar to prior.  New left pleural effusion and worsening diffuse bilateral airspace opacities interlobular septal thickening, concerning for progression of malignancy and or superimposed pneumonia.    New mesenteric stranding worse along the ascending colon.  Findings could be due to volume overload or infectious/inflammatory colitis.    Severe aortic atheromatous plaque.  No aortic aneurysm or dissection.  The renal and mesenteric arteries are patent.    This report was flagged in [employer] as abnormal.      Electronically signed by: Joycelyn Taveras  Date:    10/07/2024  Time:    19:09               Narrative:    EXAMINATION:  CTA CHEST ABDOMEN PELVIS    CLINICAL HISTORY:  mesenteric ischemia eval vs aortic eval;    TECHNIQUE:  Low dose axial images, sagittal and coronal reformations were obtained from the thoracic inlet through the pelvis following the IV administration of 100 mL of Omnipaque 350 .  No oral contrast was administered.    COMPARISON:  Chest radiograph 06/19/2024, CT chest abdomen pelvis 12/10/2023    FINDINGS:  Cardiomegaly.  No pericardial effusion.  No thoracic aortic aneurysm.  No acute pulmonary embolus.    No enlarged axillary or mediastinal nodes.  No enlarged hilar lymph nodes.    Loculated right pleural effusion similar to prior.  New small left pleural effusion.  Basilar predominant consolidation.   Central airways are clear.  Postsurgical changes of right lower lobectomy.  Diffuse ground-glass opacities, interlobular septal thickening and bronchial wall thickening increased compared to prior exam and now involving the left lung.  Bronchocentric consolidation in the right upper lobe.  Pulmonary emphysema.    Liver is normal in morphology and with smooth contour.  Stable right hepatic lobe cyst.    The spleen, pancreas, adrenal glands and kidneys are normal.  No intra or extrahepatic biliary ductal dilatation.  The gallbladder is surgically absent.  Right renal cysts better evaluated on prior CT.    Abdominal aorta with severe atherosclerotic calcification..  No aortic aneurysm or dissection.  The renal arteries and mesenteric arteries are patent.  No enlarged retroperitoneal lymph nodes.    Colonic diverticula.  New mesenteric stranding, worse about the ascending colon.  No bowel dilatation.  No organized fluid collection or free air.    Bladder is smooth walled.  Uterus is absent.  No enlarged pelvic lymph nodes.    Postsurgical changes of right femur ORIF with hardware intact.  Dextroscoliosis of the lumbar spine.  Bilateral breast implants in place.

## 2024-10-10 NOTE — ANESTHESIA POSTPROCEDURE EVALUATION
Anesthesia Post Evaluation    Patient: Alexa Otero    Procedure(s) Performed: * No procedures listed *    Final Anesthesia Type: general      Patient location during evaluation: ICU  Patient participation: Yes- Able to Participate  Level of consciousness: awake and alert  Post-procedure vital signs: reviewed and stable  Pain management: adequate  Airway patency: patent    PONV status at discharge: No PONV  Anesthetic complications: no      Cardiovascular status: blood pressure returned to baseline and stable  Respiratory status: unassisted and room air  Hydration status: euvolemic  Follow-up not needed.              Vitals Value Taken Time   BP 95/51 10/10/24 1113   Temp 36.7 °C (98 °F) 10/10/24 0701   Pulse 70 10/10/24 1113   Resp 18 10/10/24 1113   SpO2 100 % 10/10/24 1113         No case tracking events are documented in the log.      Pain/Jess Score: Pain Rating Prior to Med Admin: 3 (10/10/2024  6:36 AM)  Pain Rating Post Med Admin: 0 (10/10/2024  7:35 AM)

## 2024-10-10 NOTE — PROGRESS NOTES
[facility]  Department of Cardiology  Progress Note      PATIENT NAME: Alexa Otero  MRN: [id number]  TODAY'S DATE: 10/10/2024  ADMIT DATE: 10/7/2024                          CONSULT REQUESTED BY: Karine Goodrich DO    SUBJECTIVE     PRINCIPAL PROBLEM: Bilateral pneumonia      REASON FOR CONSULT:  Afib with RVR    INTERVAL HISTORY:  10/10/24:  Tolerated cardioversion w/o complications    10/9/24  HR trended up on max Cardizem  PO amiodarone started per pulmonology        HPI:  84-year-old female with PMH CAD, lung cancer stage IV, DM 2, who presented to the ED via EMS with reports of abdominal pain, N/V/D x3 days found to be in atrial fib with RVR.  Patient reports that she has stage IV lung cancer and went to her clinic for follow up.  She states that she was having and has had severe abdominal pain with NVD since her last chemotherapy.     Patient noted to be in atrial fibrillation with RVR on arrival to ER.  She denies prior history of atrial fibrillation but clinic notes indicate prior history.  She states she wore a heart monitor in the past that she thinks was normal.  She has been diagnosed with bilateral pneumonia.  She currently on IV Cardizem rate is variable from 100-120 bpm.  She states she does not feel palpitations or chest pain.  Her breathing is comfortable on low-flow oxygen.      Review of patient's allergies indicates:  No Known Allergies    Past Medical History:   Diagnosis Date    Arthritis     Cardiac arrest 10/2023    Cataract     Chronic obstructive pulmonary disease, unspecified COPD type 3/20/2024    Colon polyp     Coronary artery disease 3/20/2024    Diabetes mellitus type II 2012    Encounter for blood transfusion     Extra-ovarian endometrioid adenocarcinoma 2020    GERD (gastroesophageal reflux disease)     History of chronic cough     clear productive    Hyperlipidemia     Hypertension     Macular degeneration     Ovarian cancer     PONV (postoperative nausea and  vomiting)     Squamous cell carcinoma 1980's    precancer of cervix     Past Surgical History:   Procedure Laterality Date    AUGMENTATION OF BREAST      BRONCHOSCOPY N/A 12/12/2023    Procedure: BRONCHOSCOPY;  Surgeon: Neva Christian MD;  Location: [facility];  Service: ENT;  Laterality: N/A;    BRONCHOSCOPY WITH FLUOROSCOPY Right 05/11/2023    Procedure: BRONCHOSCOPY, WITH FLUOROSCOPY;  Surgeon: Neva Christian MD;  Location: [facility] END[facility];  Service: Pulmonary;  Laterality: Right;    BRONCHOSCOPY WITH FLUOROSCOPY N/A 12/15/2023    Procedure: BRONCHOSCOPY, WITH FLUOROSCOPY;  Surgeon: Neva Christian MD;  Location: [facility];  Service: Pulmonary;  Laterality: N/A;    CATARACT EXTRACTION BILATERAL W/ ANTERIOR VITRECTOMY Bilateral     CHOLECYSTECTOMY      COLONOSCOPY      COLONOSCOPY N/A 04/20/2023    Procedure: COLONOSCOPY;  Surgeon: Sabino Stephenson MD;  Location: [facility];  Service: Endoscopy;  Laterality: N/A;    CORONARY STENT PLACEMENT  10/2023    x 3    ESOPHAGOGASTRODUODENOSCOPY N/A 06/20/2018    Procedure: EGD (ESOPHAGOGASTRODUODENOSCOPY);  Surgeon: Sabino Stephenson MD;  Location: [facility];  Service: Endoscopy;  Laterality: N/A;    ESOPHAGOGASTRODUODENOSCOPY N/A 03/31/2023    Procedure: EGD (ESOPHAGOGASTRODUODENOSCOPY);  Surgeon: Sabino Stephenson MD;  Location: [facility];  Service: Endoscopy;  Laterality: N/A;    ESOPHAGOGASTRODUODENOSCOPY N/A 12/11/2023    Procedure: EGD (ESOPHAGOGASTRODUODENOSCOPY);  Surgeon: Pretty Salgado MD;  Location: [facility];  Service: Endoscopy;  Laterality: N/A;    HYSTERECTOMY  1976    partial    INJECTION OF ANESTHETIC AGENT AROUND MEDIAL BRANCH NERVES INNERVATING LUMBAR FACET JOINT Right 05/10/2023    Procedure: Block-nerve-Lateral-branch-lumbar;  Surgeon: Agustin Robles MD;  Location: [facility] OR;  Service: Pain Management;  Laterality: Right;  L5 and s1,s2 LBB    INJECTION OF ANESTHETIC AGENT AROUND MEDIAL BRANCH NERVES INNERVATING LUMBAR FACET JOINT Right 05/30/2023     Procedure: Block-nerve-medial branch-lumbar;  Surgeon: Agustin Robles MD;  Location: [facility] OR;  Service: Pain Management;  Laterality: Right;  L5, s1 ,s2 LBB #2    INJECTION OF ANESTHETIC AGENT AROUND NERVE Right 10/31/2023    Procedure: INTERCOSTAL NERVE BLOCK;  Surgeon: Washington Shankar MD;  Location: [facility] OR;  Service: Cardiothoracic;  Laterality: Right;    INJECTION, SACROILIAC JOINT Right 01/26/2023    Procedure: INJECTION,SACROILIAC JOINT;  Surgeon: Agustin Robles MD;  Location: [facility] OR;  Service: Pain Management;  Laterality: Right;    INSERTION OF TUNNELED CENTRAL VENOUS CATHETER (CVC) WITH SUBCUTANEOUS PORT Right 10/19/2020    Procedure: INSERTION, PORT-A-CATH;  Surgeon: Misti Mcfarland MD;  Location: [facility];  Service: General;  Laterality: Right;    INTRAMEDULLARY RODDING OF TROCHANTER OF FEMUR Right 11/28/2021    Procedure: INSERTION, INTRAMEDULLARY LUC, FEMUR, TROCHANTER/RIGHT TFN DR FAJARDO NOTIFIED REP;  Surgeon: Kp Fajardo MD;  Location: [facility];  Service: Orthopedics;  Laterality: Right;  SKIP    ROBOT-ASSISTED LAPAROSCOPIC LYMPHADENECTOMY USING DA NELLA XI N/A 09/21/2020    Procedure: XI ROBOTIC LYMPHADENECTOMY-pelvic and kell-aortic;  Surgeon: Altagracia Ray MD;  Location: [facility] OR;  Service: OB/GYN;  Laterality: N/A;    ROBOT-ASSISTED LAPAROSCOPIC OMENTECTOMY USING DA NELLA XI N/A 09/21/2020    Procedure: XI ROBOTIC OMENTECTOMY;  Surgeon: Altagracia Ray MD;  Location: [facility] OR;  Service: OB/GYN;  Laterality: N/A;    ROBOT-ASSISTED LAPAROSCOPIC SALPINGO-OOPHORECTOMY USING DA NELLA XI Bilateral 09/21/2020    Procedure: XI ROBOTIC SALPINGO-OOPHORECTOMY;  Surgeon: Altagracia Ray MD;  Location: [facility] OR;  Service: OB/GYN;  Laterality: Bilateral;    THORACOSCOPIC BIOPSY OF PLEURA Right 10/31/2023    Procedure: VATS, WITH PLEURA BIOPSY;  Surgeon: Washington Shankar MD;  Location: [facility];  Service: Cardiothoracic;  Laterality: Right;  FROZEN SECTION   **DESTINY**    THORACOTOMY  Right 10/31/2023    Procedure: THORACOTOMY;  Surgeon: Washington Shankar MD;  Location: [facility];  Service: Cardiothoracic;  Laterality: Right;    UPPER GASTROINTESTINAL ENDOSCOPY       Social History     Tobacco Use    Smoking status: Former     Current packs/day: 0.00     Average packs/day: 1 pack/day for 20.0 years (20.0 ttl pk-yrs)     Types: Cigarettes     Start date:      Quit date:      Years since quittin.8    Smokeless tobacco: Never    Tobacco comments:     quit 40 yrs ago   Substance Use Topics    Alcohol use: Yes     Alcohol/week: 1.0 standard drink of alcohol     Types: 1 Glasses of wine per week     Comment: occasional    Drug use: No        REVIEW OF SYSTEMS  Negative except as mentioned in HPI    OBJECTIVE     VITAL SIGNS (Most Recent)  Temp: 98 °F (36.7 °C) (10/10/24 0701)  Pulse: 70 (10/10/24 1113)  Resp: 18 (10/10/24 1113)  BP: (!) 95/51 (10/10/24 1113)  SpO2: 100 % (10/10/24 1113)    VENTILATION STATUS  Resp: 18 (10/10/24 1113)  SpO2: 100 % (10/10/24 1113)           I & O (Last 24H):  Intake/Output Summary (Last 24 hours) at 10/10/2024 1300  Last data filed at 10/10/2024 1108  Gross per 24 hour   Intake 730 ml   Output 775 ml   Net -45 ml       WEIGHTS  Wt Readings from Last 3 Encounters:   10/09/24 0707 54.9 kg (121 lb 0.5 oz)   10/07/24 1750 54.9 kg (121 lb 0.5 oz)   10/10/24 1101 54.9 kg (121 lb 0.5 oz)   10/07/24 1535 54.1 kg (119 lb 4.3 oz)       PHYSICAL EXAM    GENERAL:  Elderly female resting comfortably in bed in no apparent distress.  HEENT: Normocephalic.    NECK: No JVD.   CARDIAC:regular rate and rhythm. S1 is normal.S2 is normal.No gallops, clicks or murmurs noted at this time.  CHEST ANATOMY: normal. Right ant chest wall port in place  LUNGS:  No cough, BBS clear  ABDOMEN: Soft.   .  Normal bowel sounds.    EXTREMITIES: No edema  CENTRAL NERVOUS SYSTEM: AAO x 3  SKIN: No rash     HOME MEDICATIONS:  No current facility-administered medications on file prior to encounter.      Current Outpatient Medications on File Prior to Encounter   Medication Sig Dispense Refill    amiodarone (PACERONE) 200 MG Tab Take 1 tablet (200 mg total) by mouth once daily. 60 tablet 11    apixaban (ELIQUIS) 5 mg Tab Take 1 tablet (5 mg total) by mouth 2 (two) times daily. 2 tablets twice a day for a week, then decrease to 1 tablet twice a day 60 tablet 11    aspirin (ECOTRIN) 81 MG EC tablet Take 81 mg by mouth once daily.      benazepriL (LOTENSIN) 40 MG tablet Take 0.5 tablets (20 mg total) by mouth once daily. 45 tablet 3    clopidogreL (PLAVIX) 75 mg tablet Take 1 tablet (75 mg total) by mouth once daily. 90 tablet 2    folic acid (FOLVITE) 1 MG tablet Take 1 tablet (1 mg total) by mouth once daily. 100 tablet 3    gabapentin (NEURONTIN) 100 MG capsule Take 1 capsule (100 mg total) by mouth 2 (two) times daily. 180 capsule 3    HYDROcodone-acetaminophen (NORCO) 5-325 mg per tablet Take 1 tablet by mouth every 6 (six) hours as needed for Pain.      LORazepam (ATIVAN) 1 MG tablet Take 1 tablet (1 mg total) by mouth every 6 (six) hours as needed for Anxiety (insomnia). 30 tablet 2    magnesium oxide (MAG-OX) 400 mg (241.3 mg magnesium) tablet Take 1 tablet (400 mg total) by mouth once daily. Patient requested refill 90 tablet 3    metoprolol succinate (TOPROL-XL) 25 MG 24 hr tablet Take 1 tablet (25 mg total) by mouth once daily. 90 tablet 3    potassium chloride (K-TAB) 20 mEq Take 1 tablet by mouth once daily.      promethazine (PHENERGAN) 25 MG tablet Take 1 tablet (25 mg total) by mouth every 4 (four) hours as needed for Nausea. 30 tablet 5    VIT A/VIT C/VIT E/ZINC/COPPER (ICAPS AREDS ORAL) Take 1 capsule by mouth 2 (two) times a day.          SCHEDULED MEDS:   amiodarone  200 mg Oral BID    apixaban  5 mg Oral BID    folic acid  1 mg Oral Daily    gabapentin  100 mg Oral BID    magnesium oxide  400 mg Oral Daily    mupirocin   Nasal BID       CONTINUOUS INFUSIONS:   dilTIAZem  10 mg/hr Intravenous  Continuous 10 mL/hr at 10/10/24 0911 10 mg/hr at 10/10/24 0911       PRN MEDS:  Current Facility-Administered Medications:     acetaminophen, 650 mg, Oral, Q8H PRN    acetaminophen, 650 mg, Oral, Q4H PRN    albuterol-ipratropium, 3 mL, Nebulization, Q6H PRN    aluminum-magnesium hydroxide-simethicone, 30 mL, Oral, QID PRN    dextrose 50%, 12.5 g, Intravenous, PRN    dextrose 50%, 25 g, Intravenous, PRN    glucagon (human recombinant), 1 mg, Intramuscular, PRN    glucose, 16 g, Oral, PRN    glucose, 24 g, Oral, PRN    HYDROcodone-acetaminophen, 1 tablet, Oral, Q6H PRN    magnesium oxide, 800 mg, Oral, PRN    magnesium oxide, 800 mg, Oral, PRN    melatonin, 6 mg, Oral, Nightly PRN    morphine, 1 mg, Intravenous, Q4H PRN    naloxone, 0.02 mg, Intravenous, PRN    ondansetron, 4 mg, Intravenous, Q6H PRN    potassium bicarbonate, 35 mEq, Oral, PRN    potassium bicarbonate, 50 mEq, Oral, PRN    potassium bicarbonate, 60 mEq, Oral, PRN    potassium, sodium phosphates, 2 packet, Oral, PRN    potassium, sodium phosphates, 2 packet, Oral, PRN    potassium, sodium phosphates, 2 packet, Oral, PRN    senna-docusate 8.6-50 mg, 1 tablet, Oral, Daily PRN    sodium chloride 0.9%, 2 mL, Intravenous, PRN    LABS AND DIAGNOSTICS     CBC LAST 3 DAYS  Recent Labs   Lab 10/08/24  0513 10/09/24  0346 10/10/24  0354   WBC 2.41* 2.95* 3.26*   RBC 2.54* 3.10* 2.83*   HGB 8.0* 9.4* 8.9*   HCT 26.1* 31.1* 28.8*   * 100* 102*   MCH 31.5* 30.3 31.4*   MCHC 30.7* 30.2* 30.9*   RDW 18.0* 17.7* 17.9*   * 116* 102*   MPV 9.4 9.7 10.0   GRAN 66.9  1.6* 61.1  1.8 62.3  2.0   LYMPH 20.3  0.5* 14.2*  0.4* 13.2*  0.4*   MONO 10.8  0.3 22.4*  0.7 23.3*  0.8   BASO 0.01 0.01 0.01   NRBC 0 0 0       COAGULATION LAST 3 DAYS  Recent Labs   Lab 10/07/24  1713   INR 1.1       CHEMISTRY LAST 3 DAYS  Recent Labs   Lab 10/07/24  1713 10/08/24  0513 10/09/24  0345 10/09/24  0346 10/10/24  0354    137  --  136 136   K 4.1 3.8  --  3.8 4.7  "  CL 99 102  --  97 97   CO2 29 29  --  30* 30*   ANIONGAP 9 6*  --  9 9   BUN 28* 22  --  23 29*   CREATININE 1.2 1.0  --  1.3 1.2    110  --  136* 158*   CALCIUM 8.9 7.9*  --  8.6* 8.5*   MG 1.8  --  1.6  --  1.8   ALBUMIN 3.0* 2.5*  --  2.8* 2.7*   PROT 6.9 5.5*  --  6.3 5.9*   ALKPHOS 49* 38*  --  43* 51*   ALT 13 8*  --  10 18   AST 13 11  --  13 18   BILITOT 0.5 0.5  --  0.6 0.4       CARDIAC PROFILE LAST 3 DAYS  Recent Labs   Lab 10/07/24  1713   *       ENDOCRINE LAST 3 DAYS  Recent Labs   Lab 10/07/24  1713   TSH 0.876       LAST ARTERIAL BLOOD GAS  ABG  No results for input(s): "PH", "PO2", "PCO2", "HCO3", "BE" in the last 168 hours.    LAST 7 DAYS MICROBIOLOGY   Microbiology Results (last 7 days)       Procedure Component Value Units Date/Time    Blood culture x two cultures. Draw prior to antibiotics. [[id number]] Collected: 10/07/24 1753    Order Status: Completed Specimen: Blood from Peripheral, Hand, Left Updated: 10/09/24 2032     Blood Culture, Routine No Growth to date      No Growth to date      No Growth to date    Narrative:      Aerobic and anaerobic    Blood culture x two cultures. Draw prior to antibiotics. [[id number]] Collected: 10/07/24 1758    Order Status: Completed Specimen: Blood from Peripheral, Hand, Right Updated: 10/09/24 2032     Blood Culture, Routine No Growth to date      No Growth to date      No Growth to date    Narrative:      Aerobic and anaerobic    Respiratory Infection Panel (PCR), Nasopharyngeal [[id number]] Collected: 10/07/24 2135    Order Status: Completed Specimen: Nasopharyngeal Swab Updated: 10/07/24 2323     Respiratory Infection Panel Source NP swab     Adenovirus Not Detected     Coronavirus 229E, Common Cold Virus Not Detected     Coronavirus HKU1, Common Cold Virus Not Detected     Coronavirus NL63, Common Cold Virus Not Detected     Coronavirus OC43, Common Cold Virus Not Detected     Comment: Coronavirus strains 229E, HKU1, NL63, and OC43 can " cause the common   cold   and are not associated with the respiratory disease outbreak caused   by  the COVID-19 (SARS-CoV-2 novel Coronavirus) strain.           SARS-CoV2 (COVID-19) Qualitative PCR Not Detected     Human Metapneumovirus Not Detected     Human Rhinovirus/Enterovirus Not Detected     Influenza A (subtypes H1, H1-2009,H3) Not Detected     Influenza B Not Detected     Parainfluenza Virus 1 Not Detected     Parainfluenza Virus 2 Not Detected     Parainfluenza Virus 3 Not Detected     Parainfluenza Virus 4 Not Detected     Respiratory Syncytial Virus Not Detected     Bordetella Parapertussis ([id number]) Not Detected     Bordetella pertussis (ptxP) Not Detected     Chlamydia pneumoniae Not Detected     Mycoplasma pneumoniae Not Detected     Comment: Respiratory Infection Panel testing performed by Multiplex PCR.       Narrative:      Respiratory Infection Panel source->NP Swab            MOST RECENT IMAGING  X-Ray Chest AP Portable  Narrative: EXAMINATION:  XR CHEST AP PORTABLE    CLINICAL HISTORY:  L effusion and mild pulmonary edema;    FINDINGS:  Portable chest at 457 compared with 10/07/2024 shows unchanged cardiomediastinal silhouette. Right subclavian port catheter unchanged.    Pleural-parenchymal opacity affecting the right hemithorax, most prominent superiorly, is unchanged.  Linear opacities in right lower lung zone unchanged.  Reticulonodular opacities throughout the aerated right mid lower lung zone unchanged, with less prominent perihilar interstitial opacities in left lung unchanged.  Central pulmonary vasculature is unchanged.  No pneumothorax.  Degenerative changes affect the spine.  Right upper quadrant surgical clips unchanged.  Impression: No significant change.    Electronically signed by: Rei Carr  Date:    10/10/2024  Time:    07:17      ECHOCARDIOGRAM RESULTS (last 5)  Results for orders placed during the hospital encounter of 10/13/23    Echo    Interpretation Summary    Left  Ventricle: The left ventricle is normal in size. Normal wall thickness. Normal wall motion. There is normal systolic function with a visually estimated ejection fraction of 55 - 60%. Grade I diastolic dysfunction.    Right Ventricle: Normal right ventricular cavity size. Wall thickness is normal. Right ventricle wall motion  is normal. Systolic function is normal.    Aortic Valve: There is moderate aortic valve sclerosis.    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 There is mild mitral annular calcification present. There is no stenosis. The mean pressure gradient across the mitral valve is 3 mmHg at a heart rate of  bpm. There is mild regurgitation.    Tricuspid Valve: There is mild regurgitation.  No pulmonary hypertension.    IVC/SVC: Normal venous pressure at 3 mmHg.      CURRENT/PREVIOUS VISIT EKG  Results for orders placed or performed during the hospital encounter of 10/07/24   EKG 12-lead    Collection Time: 10/07/24  8:41 PM   Result Value Ref Range    QRS Duration 66 ms    [facility] QTC Calculation 453 ms    Narrative    Test Reason : R07.9,    Vent. Rate : 137 BPM     Atrial Rate : 000 BPM     P-R Int : 000 ms          QRS Dur : 066 ms      QT Int : 300 ms       P-R-T Axes : 000 009 090 degrees     QTc Int : 453 ms    Atrial fibrillation with rapid ventricular response  Anteroseptal infarct (cited on or before 13-OCT-2023)  Abnormal ECG  When compared with ECG of 07-OCT-2024 17:03,  No significant change was found    Referred By: [email or web address]   SELF           Confirmed By:            ASSESSMENT/PLAN:     Active Hospital Problems    Diagnosis    *Bilateral pneumonia    Generalized abdominal pain    Malignant neoplasm of right lung stage 4    HTN (hypertension)    Bilateral pleural effusion    Oxygen dependent    Atrial fibrillation with RVR    ACP (advance care planning)    CKD stage 3a, GFR 45-59 ml/min       ASSESSMENT & PLAN:   CAD  Atrial fibrillation with RVR  HTN  Pneumonia  CKD  Right  lung Ca stage 4      RECOMMENDATIONS:  Successful cardioversion w/ 120 joules to NSR  Obtain EKG today  Complete IV amiodarone and transition to PO amiodarone 200 mg BID  Continue anticoagulation w/ Eliquis 5 mg BID  Continue to hold Plavix and aspirin and closely monitor CBC (daily)  Echocardiogram showed Efx 40-45%, moderate MV stenosis  TSH normal this admission  Follow electrolytes replacement protocol to keep K>/= 4 and magnesium >/= 2.   Obtain strict I/O & daily weight      Erika Valdivia NP  [facility]  Department of Cardiology  Date of Service: 10/10/2024

## 2024-10-11 VITALS
DIASTOLIC BLOOD PRESSURE: 63 MMHG | HEART RATE: 88 BPM | BODY MASS INDEX: 20.67 KG/M2 | HEIGHT: 64 IN | WEIGHT: 121.06 LBS | OXYGEN SATURATION: 100 % | RESPIRATION RATE: 24 BRPM | SYSTOLIC BLOOD PRESSURE: 138 MMHG | TEMPERATURE: 98 F

## 2024-10-11 PROBLEM — J18.9 BILATERAL PNEUMONIA: Status: RESOLVED | Noted: 2023-12-12 | Resolved: 2024-10-11

## 2024-10-11 PROBLEM — R10.84 GENERALIZED ABDOMINAL PAIN: Status: RESOLVED | Noted: 2024-10-07 | Resolved: 2024-10-11

## 2024-10-11 PROBLEM — Z71.89 ACP (ADVANCE CARE PLANNING): Status: RESOLVED | Noted: 2023-10-13 | Resolved: 2024-10-11

## 2024-10-11 LAB
ALBUMIN SERPL BCP-MCNC: 2.6 G/DL (ref 3.5–5.2)
ALP SERPL-CCNC: 47 U/L (ref 55–135)
ALT SERPL W/O P-5'-P-CCNC: 16 U/L (ref 10–44)
ANION GAP SERPL CALC-SCNC: 6 MMOL/L (ref 8–16)
AST SERPL-CCNC: 14 U/L (ref 10–40)
BASOPHILS # BLD AUTO: 0.01 K/UL (ref 0–0.2)
BASOPHILS NFR BLD: 0.3 % (ref 0–1.9)
BILIRUB SERPL-MCNC: 0.4 MG/DL (ref 0.1–1)
BSA FOR ECHO PROCEDURE: 1.57 M2
BUN SERPL-MCNC: 23 MG/DL (ref 8–23)
CALCIUM SERPL-MCNC: 8 MG/DL (ref 8.7–10.5)
CHLORIDE SERPL-SCNC: 98 MMOL/L (ref 95–110)
CO2 SERPL-SCNC: 31 MMOL/L (ref 23–29)
CREAT SERPL-MCNC: 1.2 MG/DL (ref 0.5–1.4)
DIFFERENTIAL METHOD BLD: ABNORMAL
DOP CALC MV VTI: 19.3 CM
EOSINOPHIL # BLD AUTO: 0 K/UL (ref 0–0.5)
EOSINOPHIL NFR BLD: 0.3 % (ref 0–8)
ERYTHROCYTE [DISTWIDTH] IN BLOOD BY AUTOMATED COUNT: 17.5 % (ref 11.5–14.5)
EST. GFR  (NO RACE VARIABLE): 44.9 ML/MIN/1.73 M^2
GLUCOSE SERPL-MCNC: 157 MG/DL (ref 70–110)
HCT VFR BLD AUTO: 26 % (ref 37–48.5)
HGB BLD-MCNC: 8.1 G/DL (ref 12–16)
IMM GRANULOCYTES # BLD AUTO: 0.03 K/UL (ref 0–0.04)
IMM GRANULOCYTES NFR BLD AUTO: 0.9 % (ref 0–0.5)
LYMPHOCYTES # BLD AUTO: 0.4 K/UL (ref 1–4.8)
LYMPHOCYTES NFR BLD: 12.9 % (ref 18–48)
MAGNESIUM SERPL-MCNC: 1.8 MG/DL (ref 1.6–2.6)
MCH RBC QN AUTO: 31.6 PG (ref 27–31)
MCHC RBC AUTO-ENTMCNC: 31.2 G/DL (ref 32–36)
MCV RBC AUTO: 102 FL (ref 82–98)
MONOCYTES # BLD AUTO: 1 K/UL (ref 0.3–1)
MONOCYTES NFR BLD: 27.9 % (ref 4–15)
MV MEAN GRADIENT: 6 MMHG
MV PEAK GRADIENT: 10 MMHG
MV STENOSIS PRESSURE HALF TIME: 72 MS
MV VALVE AREA P 1/2 METHOD: 3.06 CM2
NEUTROPHILS # BLD AUTO: 2 K/UL (ref 1.8–7.7)
NEUTROPHILS NFR BLD: 57.7 % (ref 38–73)
NRBC BLD-RTO: 0 /100 WBC
OHS QRS DURATION: 66 MS
OHS QRS DURATION: 70 MS
OHS QTC CALCULATION: 451 MS
OHS QTC CALCULATION: 453 MS
PLATELET # BLD AUTO: 85 K/UL (ref 150–450)
PMV BLD AUTO: 10.6 FL (ref 9.2–12.9)
POTASSIUM SERPL-SCNC: 4.1 MMOL/L (ref 3.5–5.1)
PROT SERPL-MCNC: 5.8 G/DL (ref 6–8.4)
RBC # BLD AUTO: 2.56 M/UL (ref 4–5.4)
SODIUM SERPL-SCNC: 135 MMOL/L (ref 136–145)
WBC # BLD AUTO: 3.41 K/UL (ref 3.9–12.7)

## 2024-10-11 PROCEDURE — 25000003 PHARM REV CODE 250: Performed by: FAMILY MEDICINE

## 2024-10-11 PROCEDURE — 80053 COMPREHEN METABOLIC PANEL: CPT | Performed by: NURSE PRACTITIONER

## 2024-10-11 PROCEDURE — 27000221 HC OXYGEN, UP TO 24 HOURS

## 2024-10-11 PROCEDURE — 99233 SBSQ HOSP IP/OBS HIGH 50: CPT | Mod: ,,, | Performed by: INTERNAL MEDICINE

## 2024-10-11 PROCEDURE — 25000003 PHARM REV CODE 250: Performed by: HOSPITALIST

## 2024-10-11 PROCEDURE — 83735 ASSAY OF MAGNESIUM: CPT | Performed by: FAMILY MEDICINE

## 2024-10-11 PROCEDURE — 85025 COMPLETE CBC W/AUTO DIFF WBC: CPT | Performed by: NURSE PRACTITIONER

## 2024-10-11 PROCEDURE — 94761 N-INVAS EAR/PLS OXIMETRY MLT: CPT

## 2024-10-11 PROCEDURE — 25000003 PHARM REV CODE 250: Performed by: NURSE PRACTITIONER

## 2024-10-11 PROCEDURE — 99900035 HC TECH TIME PER 15 MIN (STAT)

## 2024-10-11 PROCEDURE — 36415 COLL VENOUS BLD VENIPUNCTURE: CPT | Performed by: NURSE PRACTITIONER

## 2024-10-11 RX ORDER — METOPROLOL SUCCINATE 25 MG/1
25 TABLET, EXTENDED RELEASE ORAL DAILY
Status: DISCONTINUED | OUTPATIENT
Start: 2024-10-11 | End: 2024-10-11 | Stop reason: HOSPADM

## 2024-10-11 RX ORDER — AMIODARONE HYDROCHLORIDE 200 MG/1
200 TABLET ORAL 2 TIMES DAILY
Qty: 60 TABLET | Refills: 0 | Status: SHIPPED | OUTPATIENT
Start: 2024-10-11

## 2024-10-11 RX ADMIN — GABAPENTIN 100 MG: 100 CAPSULE ORAL at 08:10

## 2024-10-11 RX ADMIN — APIXABAN 2.5 MG: 2.5 TABLET, FILM COATED ORAL at 08:10

## 2024-10-11 RX ADMIN — Medication 400 MG: at 08:10

## 2024-10-11 RX ADMIN — FOLIC ACID 1 MG: 1 TABLET ORAL at 08:10

## 2024-10-11 RX ADMIN — METOPROLOL SUCCINATE 25 MG: 25 TABLET, FILM COATED, EXTENDED RELEASE ORAL at 11:10

## 2024-10-11 RX ADMIN — MUPIROCIN 1 G: 20 OINTMENT TOPICAL at 08:10

## 2024-10-11 RX ADMIN — AMIODARONE HYDROCHLORIDE 200 MG: 200 TABLET ORAL at 08:10

## 2024-10-11 NOTE — NURSING
IV's dc'd. Tolerated well. DC instructions, follow up apps, and meds reviewed with pt. Verbalized understanding. Pt brought down via wheelchair with all belongings to family transport. Safety maintained.

## 2024-10-11 NOTE — PROGRESS NOTES
[facility]  Adult Nutrition   Progress Note (Follow-Up)    SUMMARY     Dietitian Rounds Brief  Follow up. PO intake of meals is ~50%. Pt is consuming 2 ensure plus HP per day. Pt drinks premier protein at home. Plans for discharge today. Encouraged pt to continue ONS at home.     Misti Christian RD 10/11/2024 4:22 PM

## 2024-10-11 NOTE — PLAN OF CARE
Problem: Adult Inpatient Plan of Care  Goal: Plan of Care Review  10/11/2024 1735 by Dot Franz RN  Outcome: Met  10/11/2024 1636 by Dot Franz RN  Outcome: Progressing  Goal: Patient-Specific Goal (Individualized)  10/11/2024 1735 by Dot Franz RN  Outcome: Met  10/11/2024 1636 by Dot Franz RN  Outcome: Progressing  Goal: Absence of Hospital-Acquired Illness or Injury  10/11/2024 1735 by Dot Franz RN  Outcome: Met  10/11/2024 1636 by Dot Franz RN  Outcome: Progressing  Goal: Optimal Comfort and Wellbeing  10/11/2024 1735 by Dot Franz RN  Outcome: Met  10/11/2024 1636 by Dot Franz RN  Outcome: Progressing  Goal: Readiness for Transition of Care  10/11/2024 1735 by Dot Franz RN  Outcome: Met  10/11/2024 1636 by Dot Franz RN  Outcome: Progressing     Problem: Pneumonia  Goal: Fluid Balance  10/11/2024 1735 by Dot Franz RN  Outcome: Met  10/11/2024 1636 by Dot Franz RN  Outcome: Progressing  Goal: Resolution of Infection Signs and Symptoms  10/11/2024 1735 by Dot Franz RN  Outcome: Met  10/11/2024 1636 by Dot Franz RN  Outcome: Progressing  Goal: Effective Oxygenation and Ventilation  10/11/2024 1735 by Dot Franz RN  Outcome: Met  10/11/2024 1636 by Dot Franz RN  Outcome: Progressing     Problem: Infection  Goal: Absence of Infection Signs and Symptoms  10/11/2024 1735 by Dot Franz RN  Outcome: Met  10/11/2024 1636 by Dot Franz RN  Outcome: Progressing     Problem: Fall Injury Risk  Goal: Absence of Fall and Fall-Related Injury  10/11/2024 1735 by Dot Franz RN  Outcome: Met  10/11/2024 1636 by Dot Franz RN  Outcome: Progressing     Problem: Skin Injury Risk Increased  Goal: Skin Health and Integrity  10/11/2024 1735 by Dot Franz, RN  Outcome: Met  10/11/2024 1636 by Dot Franz, RN  Outcome: Progressing     Problem: Oral Intake Inadequate  Goal: Improved Oral  Intake  10/11/2024 1735 by Dot Franz, RN  Outcome: Met  10/11/2024 1636 by Dot Franz, RN  Outcome: Progressing

## 2024-10-11 NOTE — CARE UPDATE
10/10/24 2125   Patient Assessment/Suction   Level of Consciousness (AVPU) alert   Respiratory Effort Unlabored   Expansion/Accessory Muscles/Retractions no use of accessory muscles   All Lung Fields Breath Sounds clear;equal bilaterally;diminished   Rhythm/Pattern, Respiratory no shortness of breath reported   Cough Frequency no cough   PRE-TX-O2   Device (Oxygen Therapy) nasal cannula   $ Is the patient on Low Flow Oxygen? Yes   Flow (L/min) (Oxygen Therapy) 2   SpO2 98 %   Pulse Oximetry Type Continuous   $ Pulse Oximetry - Multiple Charge Pulse Oximetry - Multiple   Pulse 103   Resp (!) 24   Positioning   Head of Bed (HOB) Positioning HOB elevated;HOB at 30 degrees   Aerosol Therapy   $ Aerosol Therapy Charges PRN treatment not required

## 2024-10-11 NOTE — DISCHARGE SUMMARY
[facility] Medicine  Discharge Summary      Patient Name: Alexa Otero  MRN: [id number]  LISA: [id number]  Patient Class: IP- Inpatient  Admission Date: 10/7/2024  Hospital Length of Stay: 4 days  Discharge Date and Time:  10/11/2024 4:19 PM  Attending Physician: Karine Goodrich DO   Discharging Provider: Karine Goodrich DO  Primary Care Provider: Idalia Greco MD    Primary Care Team: Networked reference to record PCT     HPI:   84-year-old female who presented to the ED from outpatient clinic via EMS with reports of abdominal pain, N/V/D x3 days found to be in atrial fib with RVR.  Patient reports that she has stage IV lung cancer and went to her clinic for follow up.  She states that she was having and has had severe abdominal pain with NVD since her last chemotherapy.  Patient denies any feelings of chest palpitations.  Had discussion with patient regarding being placed on a ventilator or having chest compressions. She stated that she did not want to have those things done and she was made a DNR in the system.     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  Loculated right pleural effusion similar to prior.  New left pleural effusion and worsening diffuse bilateral airspace opacities interlobular septal thickening concerning for progression of malignancy or superimposed pneumonia.  New mesenteric stranding worse along the ascending colon.  Could be volume overload or infectious/inflammatory colitis.  Severe aortic atheromatous plaque.  Blood work performed in ED BUN 28, WBCs 3.29, hemoglobin 10.1, hematocrit 33.8, platelets 171, .  Initial troponin 13.3    * No surgery found *      Hospital Course:   The patient was admitted and placed on a cardizem drip. Echo and cardiology consult were ordered. Her home beta blocker was restarted. She was seen by pulmonology, who did not feel patient has pneumonia, and stopped abx. Pulmonology felt pleural effusion was  due to afib with RVR. One dose of IV lasix was given. Her kidney function was monitored.  Seen by Cardiology and loading dose of amiodarone started in addition to weaning diltiazem drip.  Underwent VASILIY/CV 10/10 with return to sinus rhythm.  Cardiology recommends:   Continue amiodarone 200 mg BID  Continue Toprol-XL 25 mg daily  Anticoagulated w/ Eliquis, dose reduced to 2.5 mg b.i.d., presumably d/t platelet drop  Continue to hold Plavix and aspirin and closely monitor CBC (daily)  Echocardiogram showed Efx 40-45%, moderate MV stenosis  Okay to discharge from Cardiology standpoint with follow up in the clinic in 1-2 weeks  Repeat CBC in next few days outpatient.      Goals of Care Treatment Preferences:  Code Status: DNR    Living Will: Yes              SDOH Screening:  The patient was screened for utility difficulties, food insecurity, transport difficulties, housing insecurity, and interpersonal safety and there were no concerns identified this admission.     Consults:   Consults (From admission, onward)          Status Ordering Provider     Anesthesiology  Once        Provider:  Goyo Newman MD    Acknowledged BRITNEY DEL CID     Inpatient consult to Cardiology  Once        Provider:  Toby Olson MD    Completed CHAPINCITO HERNANDEZ     Inpatient consult to Pulmonology  Once        Provider:  Neva Christian MD    Completed CHAPINCITO HERNANDEZ S.            No new Assessment & Plan notes have been filed under this hospital service since the last note was generated.  Service: Hospital Medicine    Final Active Diagnoses:    Diagnosis Date Noted POA    Malignant neoplasm of right lung stage 4 [C34.91] 10/07/2024 Yes    HTN (hypertension) [I10] 06/19/2024 Yes    Bilateral pleural effusion [J90] 05/08/2024 Yes    Oxygen dependent [Z99.81] 01/29/2024 Not Applicable    Atrial fibrillation [I48.91] 11/05/2023 Yes    CKD stage 3a, GFR 45-59 ml/min [N18.31]  Yes      Problems Resolved During this Admission:    Diagnosis  Date Noted Date Resolved POA    PRINCIPAL PROBLEM:  Bilateral pneumonia [J18.9] 12/12/2023 10/11/2024 Yes    Generalized abdominal pain [R10.84] 10/07/2024 10/11/2024 Yes    ACP (advance care planning) [Z71.89] 10/13/2023 10/11/2024 Not Applicable       Discharged Condition: fair    Disposition: Home-Health Care Svc    Follow Up:   Follow-up Information       Idalia Greco MD Follow up in 1 week(s).    Specialty: Family Medicine  Contact information:  [address]  [place] [address]  [phone]               Neva Christian MD .    Specialties: Pulmonary Disease, Sleep Medicine  Contact information:  [address]  [address]  [place] [id number] [phone]               Toby Olson MD Follow up in 2 week(s).    Specialties: Interventional Cardiology, Cardiology  Contact information:  [address]  [address]  [place] [address]  [phone]                           Patient Instructions:      CBC auto differential   Standing Status: Future Standing Exp. Date: 01/09/26     Order Specific Question Answer Comments   Send normal result to authorizing provider's In Basket if patient is active on MyChart: Yes      Ambulatory referral/consult to Home Health   Referral Priority: Routine Referral Type: Home Health   Referral Reason: Specialty Services Required   Requested Specialty: Home Health Services   Number of Visits Requested: 1     Diet Cardiac     Notify your health care provider if you experience any of the following:  temperature >100.4     Notify your health care provider if you experience any of the following:  persistent nausea and vomiting or diarrhea     Notify your health care provider if you experience any of the following:  severe uncontrolled pain     Notify your health care provider if you experience any of the following:  persistent dizziness, light-headedness, or visual disturbances     Activity as tolerated       Significant Diagnostic Studies: Labs: BMP:   Recent Labs   Lab  "10/10/24  0354 10/11/24  0256   * 157*    135*   K 4.7 4.1   CL 97 98   CO2 30* 31*   BUN 29* 23   CREATININE 1.2 1.2   CALCIUM 8.5* 8.0*   MG 1.8 1.8   , CMP   Recent Labs   Lab 10/10/24  0354 10/11/24  0256    135*   K 4.7 4.1   CL 97 98   CO2 30* 31*   * 157*   BUN 29* 23   CREATININE 1.2 1.2   CALCIUM 8.5* 8.0*   PROT 5.9* 5.8*   ALBUMIN 2.7* 2.6*   BILITOT 0.4 0.4   ALKPHOS 51* 47*   AST 18 14   ALT 18 16   ANIONGAP 9 6*   , CBC   Recent Labs   Lab 10/10/24  0354 10/11/24  0256   WBC 3.26* 3.41*   HGB 8.9* 8.1*   HCT 28.8* 26.0*   * 85*   , INR   Lab Results   Component Value Date    INR 1.1 10/07/2024    INR 1.3 (H) 12/03/2023    INR 0.9 10/27/2023   , Lipid Panel   Lab Results   Component Value Date    CHOL 220 (H) 06/26/2024    HDL 55 06/26/2024    LDLCALC 148.0 06/26/2024    TRIG 85 06/26/2024    CHOLHDL 25.0 06/26/2024   , Troponin No results for input(s): "TROPONINI" in the last 168 hours., and A1C:   Recent Labs   Lab 06/26/24  0958   HGBA1C 5.5     Radiology:   Imaging Results              X-Ray Chest AP Portable (Final result)  Result time 10/07/24 19:05:03      Final result by Virgil Gongora MD (10/07/24 19:05:03)                   Impression:      Abnormal chest radiograph as above.      Electronically signed by: Virgil Gongora MD  Date:    10/07/2024  Time:    19:05               Narrative:    EXAMINATION:  XR CHEST AP PORTABLE    CLINICAL HISTORY:  Sepsis;    TECHNIQUE:  Single frontal view of the chest was performed.    COMPARISON:  06/19/2024    FINDINGS:  There is a right-sided MediPort catheter in similar position.  Cardiac monitoring leads overlie the chest.  There is stable mild rightward deviation of the trachea.  Cardiac silhouette is stable in size and configuration.  There is aortic atherosclerosis.  There is a large loculated right-sided pleural effusion.  There are diffuse patchy airspace opacities throughout the aerated right lung as well as the " left lung which may reflect edema, aspiration, or multifocal infection.  No large volume of left-sided pleural fluid appreciated radiographically.  There are bilateral calcified breast prosthesis present.  No definite pneumothorax identified.  Osseous structures demonstrate degenerative changes.                                        CTA Chest Abdomen Pelvis (Final result)  Result time 10/07/24 19:09:26      Final result by Joycelyn Taveras MD (10/07/24 19:09:26)                   Impression:      Postsurgical changes of right lower lobectomy.  Loculated right pleural effusion similar to prior.  New left pleural effusion and worsening diffuse bilateral airspace opacities interlobular septal thickening, concerning for progression of malignancy and or superimposed pneumonia.    New mesenteric stranding worse along the ascending colon.  Findings could be due to volume overload or infectious/inflammatory colitis.    Severe aortic atheromatous plaque.  No aortic aneurysm or dissection.  The renal and mesenteric arteries are patent.    This report was flagged in Epic as abnormal.      Electronically signed by: Joycelyn Taveras  Date:    10/07/2024  Time:    19:09               Narrative:    EXAMINATION:  CTA CHEST ABDOMEN PELVIS    CLINICAL HISTORY:  mesenteric ischemia eval vs aortic eval;    TECHNIQUE:  Low dose axial images, sagittal and coronal reformations were obtained from the thoracic inlet through the pelvis following the IV administration of 100 mL of Omnipaque 350 .  No oral contrast was administered.    COMPARISON:  Chest radiograph 06/19/2024, CT chest abdomen pelvis 12/10/2023    FINDINGS:  Cardiomegaly.  No pericardial effusion.  No thoracic aortic aneurysm.  No acute pulmonary embolus.    No enlarged axillary or mediastinal nodes.  No enlarged hilar lymph nodes.    Loculated right pleural effusion similar to prior.  New small left pleural effusion.  Basilar predominant consolidation.  Central airways are clear.   Postsurgical changes of right lower lobectomy.  Diffuse ground-glass opacities, interlobular septal thickening and bronchial wall thickening increased compared to prior exam and now involving the left lung.  Bronchocentric consolidation in the right upper lobe.  Pulmonary emphysema.    Liver is normal in morphology and with smooth contour.  Stable right hepatic lobe cyst.    The spleen, pancreas, adrenal glands and kidneys are normal.  No intra or extrahepatic biliary ductal dilatation.  The gallbladder is surgically absent.  Right renal cysts better evaluated on prior CT.    Abdominal aorta with severe atherosclerotic calcification..  No aortic aneurysm or dissection.  The renal arteries and mesenteric arteries are patent.  No enlarged retroperitoneal lymph nodes.    Colonic diverticula.  New mesenteric stranding, worse about the ascending colon.  No bowel dilatation.  No organized fluid collection or free air.    Bladder is smooth walled.  Uterus is absent.  No enlarged pelvic lymph nodes.    Postsurgical changes of right femur ORIF with hardware intact.  Dextroscoliosis of the lumbar spine.  Bilateral breast implants in place.                                      Cardiac Graphics: Echocardiogram: 2D echo with color flow doppler: No results found. However, due to the size of the patient record, not all encounters were searched. Please check Results Review for a complete set of results. and Transthoracic echo (TTE) complete (Cupid Only):   Results for orders placed or performed during the hospital encounter of 10/07/24   Echo   Result Value Ref Range    Holland's Biplane MOD Ejection Fraction 40 %    A2C EF 47 %    A4C EF 42 %    LVOT stroke volume 26.1 cm3    LVIDd 3.4 (A) 3.5 - 6.0 cm    LV Systolic Volume 30.87 mL    LV Systolic Volume Index 19.5 mL/m2    LVIDs 2.9 2.1 - 4.0 cm    LV Diastolic Volume 47.77 mL    LV Diastolic Volume Index 30.23 mL/m2    LV EDV A2C 62.5 mL    LV EDV A4C 32.50 mL    Left  Ventricular End Systolic Volume by Teichholz Method 30.87 mL    Left Ventricular End Diastolic Volume by Teichholz Method 47.77 mL    IVS 1.3 (A) 0.6 - 1.1 cm    LVOT diameter 1.6 cm    LVOT area 2.0 cm2    FS 14.7 (A) 28 - 44 %    Left Ventricle Relative Wall Thickness 0.53 cm    PW 0.9 0.6 - 1.1 cm    LV mass 114.0 g    LV Mass Index 72.2 g/m2    MV Peak E Tj 1.58 m/s    TDI LATERAL 0.12 m/s    TDI SEPTAL 0.09 m/s    E/E' ratio 15.05 m/s    TR Max Tj 2.53 m/s    E wave deceleration time 254.00 msec    LV SEPTAL E/E' RATIO 17.56 m/s    LV LATERAL E/E' RATIO 13.17 m/s    LVOT peak tj 0.9 m/s    Left Ventricular Outflow Tract Mean Velocity 0.56 cm/s    Left Ventricular Outflow Tract Mean Gradient 2.00 mmHg    RV S' 8.49 cm/s    TAPSE 1.40 cm    AV mean gradient 5.0 mmHg    AV peak gradient 9.0 mmHg    Ao peak tj 1.5 m/s    Ao VTI 21.1 cm    LVOT peak VTI 13.0 cm    AV valve area 1.2 cm²    AV Velocity Ratio 0.60     AV index (prosthetic) 0.62     ANG by Velocity Ratio 1.2 cm²    Mr max tj 4.42 m/s    MV mean gradient 8 mmHg    MV peak gradient 15 mmHg    MV valve area by continuity eq 0.71 cm2    MV VTI 36.8 cm    Triscuspid Valve Regurgitation Peak Gradient 26 mmHg    PV PEAK VELOCITY 0.96 m/s    PV peak gradient 4 mmHg    Pulmonary Valve Mean Velocity 0.72 m/s    Ao root annulus 2.30 cm    IVC diameter 1.79 cm    Mean e' 0.11 m/s    ZLVIDS 0.26     ZLVIDD -2.86     AORTIC VALVE CUSP SEPERATION 1.00 cm    BSA 1.55 m2    TV resting pulmonary artery pressure 29 mmHg    RV TB RVSP 6 mmHg    Est. RA pres 3 mmHg    Narrative      Left Ventricle: The left ventricle is normal in size. Mildly increased   wall thickness. There is mild concentric hypertrophy. Moderate global   hypokinesis present. There is mildly reduced systolic function with a   visually estimated ejection fraction of 40 - 45%. There is normal   diastolic function.    Right Ventricle: Normal right ventricular cavity size. Wall thickness   is normal.  Systolic function is normal.    Left Atrium: Left atrium is moderately dilated.    Mitral Valve: There is bileaflet sclerosis. There is moderate mitral   annular calcification present. There is moderate stenosis. The mean   pressure gradient across the mitral valve is 8 mmHg at a heart rate of    bpm. There is mild regurgitation with a centrally directed jet.    Pulmonary Artery: The estimated pulmonary artery systolic pressure is   29 mmHg.    IVC/SVC: Normal venous pressure at 3 mmHg.         Pending Diagnostic Studies:       None           Medications:  Reconciled Home Medications:      Medication List        CHANGE how you take these medications      amiodarone 200 MG Tab  Commonly known as: PACERONE  Take 1 tablet (200 mg total) by mouth 2 (two) times daily.  What changed: when to take this     apixaban 2.5 mg Tab  Commonly known as: ELIQUIS  Take 1 tablet (2.5 mg total) by mouth 2 (two) times daily.  What changed:   medication strength  how much to take  additional instructions            CONTINUE taking these medications      folic acid 1 MG tablet  Commonly known as: FOLVITE  Take 1 tablet (1 mg total) by mouth once daily.     gabapentin 100 MG capsule  Commonly known as: NEURONTIN  Take 1 capsule (100 mg total) by mouth 2 (two) times daily.     HYDROcodone-acetaminophen 5-325 mg per tablet  Commonly known as: NORCO  Take 1 tablet by mouth every 6 (six) hours as needed for Pain.     ICAPS AREDS ORAL  Take 1 capsule by mouth 2 (two) times a day.     LORazepam 1 MG tablet  Commonly known as: ATIVAN  Take 1 tablet (1 mg total) by mouth every 6 (six) hours as needed for Anxiety (insomnia).     magnesium oxide 400 mg (241.3 mg magnesium) tablet  Commonly known as: MAG-OX  Take 1 tablet (400 mg total) by mouth once daily. Patient requested refill     metoprolol succinate 25 MG 24 hr tablet  Commonly known as: TOPROL-XL  Take 1 tablet (25 mg total) by mouth once daily.            STOP taking these medications       aspirin 81 MG EC tablet  Commonly known as: ECOTRIN     benazepriL 40 MG tablet  Commonly known as: LOTENSIN     clopidogreL 75 mg tablet  Commonly known as: PLAVIX     potassium chloride 20 mEq  Commonly known as: K-TAB     promethazine 25 MG tablet  Commonly known as: PHENERGAN              Indwelling Lines/Drains at time of discharge:   Lines/Drains/Airways       Central Venous Catheter Line  Duration             Port A Cath Single Lumen Subclavian Right -- days              Drain  Duration             Female External Urinary Catheter w/ Suction 10/09/24 0933 2 days                    Time spent on the discharge of patient: 32 minutes         Karine Goodrich DO  Department of Hospital Medicine  formerly [facility]

## 2024-10-11 NOTE — PROGRESS NOTES
[facility]  Department of Cardiology  Progress Note      PATIENT NAME: Alexa Otero  MRN: [id number]  TODAY'S DATE: 10/11/2024  ADMIT DATE: 10/7/2024                          CONSULT REQUESTED BY: Karine Goodrich DO    SUBJECTIVE     PRINCIPAL PROBLEM: Bilateral pneumonia      REASON FOR CONSULT:  Afib with RVR    INTERVAL HISTORY:  10/11/2024  Remains in sinus rhythm  Breathing comfortably in flat position  Platelets decreased to 85    10/10/24:  Tolerated cardioversion w/o complications    10/9/24  HR trended up on max Cardizem  PO amiodarone started per pulmonology        HPI:  84-year-old female with PMH CAD, lung cancer stage IV, DM 2, who presented to the ED via EMS with reports of abdominal pain, N/V/D x3 days found to be in atrial fib with RVR.  Patient reports that she has stage IV lung cancer and went to her clinic for follow up.  She states that she was having and has had severe abdominal pain with NVD since her last chemotherapy.     Patient noted to be in atrial fibrillation with RVR on arrival to ER.  She denies prior history of atrial fibrillation but clinic notes indicate prior history.  She states she wore a heart monitor in the past that she thinks was normal.  She has been diagnosed with bilateral pneumonia.  She currently on IV Cardizem rate is variable from 100-120 bpm.  She states she does not feel palpitations or chest pain.  Her breathing is comfortable on low-flow oxygen.      Review of patient's allergies indicates:  No Known Allergies    Past Medical History:   Diagnosis Date    Arthritis     Cardiac arrest 10/2023    Cataract     Chronic obstructive pulmonary disease, unspecified COPD type 3/20/2024    Colon polyp     Coronary artery disease 3/20/2024    Diabetes mellitus type II 2012    Encounter for blood transfusion     Extra-ovarian endometrioid adenocarcinoma 2020    GERD (gastroesophageal reflux disease)     History of chronic cough     clear productive     Hyperlipidemia     Hypertension     Macular degeneration     Ovarian cancer     PONV (postoperative nausea and vomiting)     Squamous cell carcinoma 1980's    precancer of cervix     Past Surgical History:   Procedure Laterality Date    AUGMENTATION OF BREAST      BRONCHOSCOPY N/A 12/12/2023    Procedure: BRONCHOSCOPY;  Surgeon: Neva Chrisitan MD;  Location: [facility];  Service: ENT;  Laterality: N/A;    BRONCHOSCOPY WITH FLUOROSCOPY Right 05/11/2023    Procedure: BRONCHOSCOPY, WITH FLUOROSCOPY;  Surgeon: Neva Chritsian MD;  Location: [facility] ENDO;  Service: Pulmonary;  Laterality: Right;    BRONCHOSCOPY WITH FLUOROSCOPY N/A 12/15/2023    Procedure: BRONCHOSCOPY, WITH FLUOROSCOPY;  Surgeon: Neva Christian MD;  Location: [facility] ENDO;  Service: Pulmonary;  Laterality: N/A;    CATARACT EXTRACTION BILATERAL W/ ANTERIOR VITRECTOMY Bilateral     CHOLECYSTECTOMY      COLONOSCOPY      COLONOSCOPY N/A 04/20/2023    Procedure: COLONOSCOPY;  Surgeon: Sabino Stephenson MD;  Location: [facility];  Service: Endoscopy;  Laterality: N/A;    CORONARY STENT PLACEMENT  10/2023    x 3    ESOPHAGOGASTRODUODENOSCOPY N/A 06/20/2018    Procedure: EGD (ESOPHAGOGASTRODUODENOSCOPY);  Surgeon: Sabino Stephenson MD;  Location: [facility];  Service: Endoscopy;  Laterality: N/A;    ESOPHAGOGASTRODUODENOSCOPY N/A 03/31/2023    Procedure: EGD (ESOPHAGOGASTRODUODENOSCOPY);  Surgeon: Sabino Stephenson MD;  Location: [facility];  Service: Endoscopy;  Laterality: N/A;    ESOPHAGOGASTRODUODENOSCOPY N/A 12/11/2023    Procedure: EGD (ESOPHAGOGASTRODUODENOSCOPY);  Surgeon: Pretty Salgado MD;  Location: [facility];  Service: Endoscopy;  Laterality: N/A;    HYSTERECTOMY  1976    partial    INJECTION OF ANESTHETIC AGENT AROUND MEDIAL BRANCH NERVES INNERVATING LUMBAR FACET JOINT Right 05/10/2023    Procedure: Block-nerve-Lateral-branch-lumbar;  Surgeon: Agustin Robles MD;  Location: [facility] OR;  Service: Pain Management;  Laterality: Right;  L5 and s1,s2 LBB     INJECTION OF ANESTHETIC AGENT AROUND MEDIAL BRANCH NERVES INNERVATING LUMBAR FACET JOINT Right 05/30/2023    Procedure: Block-nerve-medial branch-lumbar;  Surgeon: Agustin Robles MD;  Location: [facility] OR;  Service: Pain Management;  Laterality: Right;  L5, s1 ,s2 LBB #2    INJECTION OF ANESTHETIC AGENT AROUND NERVE Right 10/31/2023    Procedure: INTERCOSTAL NERVE BLOCK;  Surgeon: Washington Shankar MD;  Location: [facility] OR;  Service: Cardiothoracic;  Laterality: Right;    INJECTION, SACROILIAC JOINT Right 01/26/2023    Procedure: INJECTION,SACROILIAC JOINT;  Surgeon: Agustin Robles MD;  Location: [facility] OR;  Service: Pain Management;  Laterality: Right;    INSERTION OF TUNNELED CENTRAL VENOUS CATHETER (CVC) WITH SUBCUTANEOUS PORT Right 10/19/2020    Procedure: INSERTION, PORT-A-CATH;  Surgeon: Misti Mcfarland MD;  Location: [facility] OR;  Service: General;  Laterality: Right;    INTRAMEDULLARY RODDING OF TROCHANTER OF FEMUR Right 11/28/2021    Procedure: INSERTION, INTRAMEDULLARY LUC, FEMUR, TROCHANTER/RIGHT TFN DR FAJARDO NOTIFIED REP;  Surgeon: Kp Fajardo MD;  Location: [facility] OR;  Service: Orthopedics;  Laterality: Right;  SKIP    ROBOT-ASSISTED LAPAROSCOPIC LYMPHADENECTOMY USING DA NELLA XI N/A 09/21/2020    Procedure: XI ROBOTIC LYMPHADENECTOMY-pelvic and kell-aortic;  Surgeon: Altagracia aRy MD;  Location: [facility] OR;  Service: OB/GYN;  Laterality: N/A;    ROBOT-ASSISTED LAPAROSCOPIC OMENTECTOMY USING DA NELLA XI N/A 09/21/2020    Procedure: XI ROBOTIC OMENTECTOMY;  Surgeon: Altagracia Ray MD;  Location: [facility] OR;  Service: OB/GYN;  Laterality: N/A;    ROBOT-ASSISTED LAPAROSCOPIC SALPINGO-OOPHORECTOMY USING DA NELLA XI Bilateral 09/21/2020    Procedure: XI ROBOTIC SALPINGO-OOPHORECTOMY;  Surgeon: Altagracia Ray MD;  Location: [facility] OR;  Service: OB/GYN;  Laterality: Bilateral;    THORACOSCOPIC BIOPSY OF PLEURA Right 10/31/2023    Procedure: VATS, WITH PLEURA BIOPSY;  Surgeon: Washington Shankar MD;   Location: [facility] OR;  Service: Cardiothoracic;  Laterality: Right;  FROZEN SECTION   **KRISTEN-ASSISDT**    THORACOTOMY Right 10/31/2023    Procedure: THORACOTOMY;  Surgeon: Washington Shankar MD;  Location: [facility] OR;  Service: Cardiothoracic;  Laterality: Right;    UPPER GASTROINTESTINAL ENDOSCOPY       Social History     Tobacco Use    Smoking status: Former     Current packs/day: 0.00     Average packs/day: 1 pack/day for 20.0 years (20.0 ttl pk-yrs)     Types: Cigarettes     Start date:      Quit date:      Years since quittin.8    Smokeless tobacco: Never    Tobacco comments:     quit 40 yrs ago   Substance Use Topics    Alcohol use: Yes     Alcohol/week: 1.0 standard drink of alcohol     Types: 1 Glasses of wine per week     Comment: occasional    Drug use: No        REVIEW OF SYSTEMS  Negative except as mentioned in HPI    OBJECTIVE     VITAL SIGNS (Most Recent)  Temp: 97.4 °F (36.3 °C) (10/11/24 0715)  Pulse: 90 (10/11/24 1145)  Resp: 20 (10/11/24 1145)  BP: 134/62 (10/11/24 1142)  SpO2: 100 % (10/11/24 1145)    VENTILATION STATUS  Resp: 20 (10/11/24 1145)  SpO2: 100 % (10/11/24 1145)           I & O (Last 24H):  Intake/Output Summary (Last 24 hours) at 10/11/2024 1234  Last data filed at 10/11/2024 1009  Gross per 24 hour   Intake 950 ml   Output 900 ml   Net 50 ml       WEIGHTS  Wt Readings from Last 3 Encounters:   10/09/24 0707 54.9 kg (121 lb 0.5 oz)   10/07/24 1750 54.9 kg (121 lb 0.5 oz)   10/10/24 1101 54.9 kg (121 lb 0.5 oz)   10/07/24 1535 54.1 kg (119 lb 4.3 oz)       PHYSICAL EXAM    GENERAL:  Elderly female resting comfortably in bed in no apparent distress.  HEENT: Normocephalic.    NECK: No JVD.   CARDIAC:regular rate and rhythm. S1 is normal.S2 is normal.No gallops, clicks or murmurs noted at this time.  CHEST ANATOMY: normal. Right ant chest wall port in place  LUNGS:  No cough, BBS clear  ABDOMEN: Soft.   .  Normal bowel sounds.    EXTREMITIES: No edema  CENTRAL NERVOUS SYSTEM: AAO  x 3  SKIN: No rash     HOME MEDICATIONS:  No current facility-administered medications on file prior to encounter.     Current Outpatient Medications on File Prior to Encounter   Medication Sig Dispense Refill    amiodarone (PACERONE) 200 MG Tab Take 1 tablet (200 mg total) by mouth once daily. 60 tablet 11    apixaban (ELIQUIS) 5 mg Tab Take 1 tablet (5 mg total) by mouth 2 (two) times daily. 2 tablets twice a day for a week, then decrease to 1 tablet twice a day 60 tablet 11    aspirin (ECOTRIN) 81 MG EC tablet Take 81 mg by mouth once daily.      benazepriL (LOTENSIN) 40 MG tablet Take 0.5 tablets (20 mg total) by mouth once daily. 45 tablet 3    clopidogreL (PLAVIX) 75 mg tablet Take 1 tablet (75 mg total) by mouth once daily. 90 tablet 2    folic acid (FOLVITE) 1 MG tablet Take 1 tablet (1 mg total) by mouth once daily. 100 tablet 3    gabapentin (NEURONTIN) 100 MG capsule Take 1 capsule (100 mg total) by mouth 2 (two) times daily. 180 capsule 3    HYDROcodone-acetaminophen (NORCO) 5-325 mg per tablet Take 1 tablet by mouth every 6 (six) hours as needed for Pain.      LORazepam (ATIVAN) 1 MG tablet Take 1 tablet (1 mg total) by mouth every 6 (six) hours as needed for Anxiety (insomnia). 30 tablet 2    magnesium oxide (MAG-OX) 400 mg (241.3 mg magnesium) tablet Take 1 tablet (400 mg total) by mouth once daily. Patient requested refill 90 tablet 3    metoprolol succinate (TOPROL-XL) 25 MG 24 hr tablet Take 1 tablet (25 mg total) by mouth once daily. 90 tablet 3    potassium chloride (K-TAB) 20 mEq Take 1 tablet by mouth once daily.      promethazine (PHENERGAN) 25 MG tablet Take 1 tablet (25 mg total) by mouth every 4 (four) hours as needed for Nausea. 30 tablet 5    VIT A/VIT C/VIT E/ZINC/COPPER (ICAPS AREDS ORAL) Take 1 capsule by mouth 2 (two) times a day.          SCHEDULED MEDS:   amiodarone  200 mg Oral BID    apixaban  2.5 mg Oral BID    folic acid  1 mg Oral Daily    gabapentin  100 mg Oral BID     magnesium oxide  400 mg Oral Daily    metoprolol succinate  25 mg Oral Daily    mupirocin   Nasal BID       CONTINUOUS INFUSIONS:   dilTIAZem  10 mg/hr Intravenous Continuous   Stopped at 10/10/24 1100       PRN MEDS:  Current Facility-Administered Medications:     acetaminophen, 650 mg, Oral, Q8H PRN    acetaminophen, 650 mg, Oral, Q4H PRN    albuterol-ipratropium, 3 mL, Nebulization, Q6H PRN    aluminum-magnesium hydroxide-simethicone, 30 mL, Oral, QID PRN    dextrose 50%, 12.5 g, Intravenous, PRN    dextrose 50%, 25 g, Intravenous, PRN    glucagon (human recombinant), 1 mg, Intramuscular, PRN    glucose, 16 g, Oral, PRN    glucose, 24 g, Oral, PRN    HYDROcodone-acetaminophen, 1 tablet, Oral, Q6H PRN    magnesium oxide, 800 mg, Oral, PRN    magnesium oxide, 800 mg, Oral, PRN    melatonin, 6 mg, Oral, Nightly PRN    morphine, 1 mg, Intravenous, Q4H PRN    naloxone, 0.02 mg, Intravenous, PRN    ondansetron, 4 mg, Intravenous, Q6H PRN    potassium bicarbonate, 35 mEq, Oral, PRN    potassium bicarbonate, 50 mEq, Oral, PRN    potassium bicarbonate, 60 mEq, Oral, PRN    potassium, sodium phosphates, 2 packet, Oral, PRN    potassium, sodium phosphates, 2 packet, Oral, PRN    potassium, sodium phosphates, 2 packet, Oral, PRN    senna-docusate 8.6-50 mg, 1 tablet, Oral, Daily PRN    sodium chloride 0.9%, 2 mL, Intravenous, PRN    LABS AND DIAGNOSTICS     CBC LAST 3 DAYS  Recent Labs   Lab 10/09/24  0346 10/10/24  0354 10/11/24  0256   WBC 2.95* 3.26* 3.41*   RBC 3.10* 2.83* 2.56*   HGB 9.4* 8.9* 8.1*   HCT 31.1* 28.8* 26.0*   * 102* 102*   MCH 30.3 31.4* 31.6*   MCHC 30.2* 30.9* 31.2*   RDW 17.7* 17.9* 17.5*   * 102* 85*   MPV 9.7 10.0 10.6   GRAN 61.1  1.8 62.3  2.0 57.7  2.0   LYMPH 14.2*  0.4* 13.2*  0.4* 12.9*  0.4*   MONO 22.4*  0.7 23.3*  0.8 27.9*  1.0   BASO 0.01 0.01 0.01   NRBC 0 0 0       COAGULATION LAST 3 DAYS  Recent Labs   Lab 10/07/24  1713   INR 1.1       CHEMISTRY LAST 3  "DAYS  Recent Labs   Lab 10/09/24  0345 10/09/24  0346 10/10/24  0354 10/11/24  0256   NA  --  136 136 135*   K  --  3.8 4.7 4.1   CL  --  97 97 98   CO2  --  30* 30* 31*   ANIONGAP  --  9 9 6*   BUN  --  23 29* 23   CREATININE  --  1.3 1.2 1.2   GLU  --  136* 158* 157*   CALCIUM  --  8.6* 8.5* 8.0*   MG 1.6  --  1.8 1.8   ALBUMIN  --  2.8* 2.7* 2.6*   PROT  --  6.3 5.9* 5.8*   ALKPHOS  --  43* 51* 47*   ALT  --  10 18 16   AST  --  13 18 14   BILITOT  --  0.6 0.4 0.4       CARDIAC PROFILE LAST 3 DAYS  Recent Labs   Lab 10/07/24  1713   *       ENDOCRINE LAST 3 DAYS  Recent Labs   Lab 10/07/24  1713   TSH 0.876       LAST ARTERIAL BLOOD GAS  ABG  No results for input(s): "PH", "PO2", "PCO2", "HCO3", "BE" in the last 168 hours.    LAST 7 DAYS MICROBIOLOGY   Microbiology Results (last 7 days)       Procedure Component Value Units Date/Time    Blood culture x two cultures. Draw prior to antibiotics. [[id number]] Collected: 10/07/24 1753    Order Status: Completed Specimen: Blood from Peripheral, Hand, Left Updated: 10/10/24 2032     Blood Culture, Routine No Growth to date      No Growth to date      No Growth to date      No Growth to date    Narrative:      Aerobic and anaerobic    Blood culture x two cultures. Draw prior to antibiotics. [[id number]] Collected: 10/07/24 1758    Order Status: Completed Specimen: Blood from Peripheral, Hand, Right Updated: 10/10/24 2032     Blood Culture, Routine No Growth to date      No Growth to date      No Growth to date      No Growth to date    Narrative:      Aerobic and anaerobic    Respiratory Infection Panel (PCR), Nasopharyngeal [[id number]] Collected: 10/07/24 2135    Order Status: Completed Specimen: Nasopharyngeal Swab Updated: 10/07/24 2323     Respiratory Infection Panel Source NP swab     Adenovirus Not Detected     Coronavirus 229E, Common Cold Virus Not Detected     Coronavirus HKU1, Common Cold Virus Not Detected     Coronavirus NL63, Common Cold Virus Not " Detected     Coronavirus OC43, Common Cold Virus Not Detected     Comment: Coronavirus strains 229E, HKU1, NL63, and OC43 can cause the common   cold   and are not associated with the respiratory disease outbreak caused   by  the COVID-19 (SARS-CoV-2 novel Coronavirus) strain.           SARS-CoV2 (COVID-19) Qualitative PCR Not Detected     Human Metapneumovirus Not Detected     Human Rhinovirus/Enterovirus Not Detected     Influenza A (subtypes H1, H1-2009,H3) Not Detected     Influenza B Not Detected     Parainfluenza Virus 1 Not Detected     Parainfluenza Virus 2 Not Detected     Parainfluenza Virus 3 Not Detected     Parainfluenza Virus 4 Not Detected     Respiratory Syncytial Virus Not Detected     Bordetella Parapertussis (UH2833) Not Detected     Bordetella pertussis (ptxP) Not Detected     Chlamydia pneumoniae Not Detected     Mycoplasma pneumoniae Not Detected     Comment: Respiratory Infection Panel testing performed by Multiplex PCR.       Narrative:      Respiratory Infection Panel source->NP Swab            MOST RECENT IMAGING  X-Ray Chest AP Portable  Narrative: EXAMINATION:  XR CHEST AP PORTABLE    CLINICAL HISTORY:  L effusion and mild pulmonary edema;    FINDINGS:  Portable chest at 457 compared with 10/07/2024 shows unchanged cardiomediastinal silhouette. Right subclavian port catheter unchanged.    Pleural-parenchymal opacity affecting the right hemithorax, most prominent superiorly, is unchanged.  Linear opacities in right lower lung zone unchanged.  Reticulonodular opacities throughout the aerated right mid lower lung zone unchanged, with less prominent perihilar interstitial opacities in left lung unchanged.  Central pulmonary vasculature is unchanged.  No pneumothorax.  Degenerative changes affect the spine.  Right upper quadrant surgical clips unchanged.  Impression: No significant change.    Electronically signed by: Rei Carr  Date:    10/10/2024  Time:    07:17      ECHOCARDIOGRAM  RESULTS (last 5)  Results for orders placed during the hospital encounter of 10/13/23    Echo    Interpretation Summary    Left Ventricle: The left ventricle is normal in size. Normal wall thickness. Normal wall motion. There is normal systolic function with a visually estimated ejection fraction of 55 - 60%. Grade I diastolic dysfunction.    Right Ventricle: Normal right ventricular cavity size. Wall thickness is normal. Right ventricle wall motion  is normal. Systolic function is normal.    Aortic Valve: There is moderate aortic valve sclerosis.    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 There is mild mitral annular calcification present. There is no stenosis. The mean pressure gradient across the mitral valve is 3 mmHg at a heart rate of  bpm. There is mild regurgitation.    Tricuspid Valve: There is mild regurgitation.  No pulmonary hypertension.    IVC/SVC: Normal venous pressure at 3 mmHg.      CURRENT/PREVIOUS VISIT EKG  Results for orders placed or performed during the hospital encounter of 10/07/24   EKG 12-LEAD    Collection Time: 10/10/24  1:30 PM   Result Value Ref Range    QRS Duration 66 ms    [facility] QTC Calculation 462 ms    Narrative    Test Reason : I48.91,    Vent. Rate : 083 BPM     Atrial Rate : 083 BPM     P-R Int : 152 ms          QRS Dur : 066 ms      QT Int : 394 ms       P-R-T Axes : 020 020 060 degrees     QTc Int : 462 ms    Normal sinus rhythm  Septal infarct (cited on or before 13-OCT-2023)  Abnormal ECG  When compared with ECG of 07-OCT-2024 20:41,  Sinus rhythm has replaced Atrial fibrillation  Vent. rate has decreased BY  54 BPM  Questionable change in initial forces of Anterior leads    Referred By: [email or web address]   SELF           Confirmed By:            ASSESSMENT/PLAN:     Active Hospital Problems    Diagnosis    *Bilateral pneumonia    Generalized abdominal pain    Malignant neoplasm of right lung stage 4    HTN (hypertension)    Bilateral pleural effusion     Oxygen dependent    Atrial fibrillation    ACP (advance care planning)    CKD stage 3a, GFR 45-59 ml/min       ASSESSMENT & PLAN:   CAD  Atrial fibrillation with RVR  HTN  Pneumonia  CKD  Right lung Ca stage 4      RECOMMENDATIONS:  Patient has remained in sinus rhythm post cardioversion  Continue amiodarone 200 mg BID  Continue Toprol-XL 25 mg daily  Anticoagulated w/ Eliquis, dose reduced to 2.5 mg b.i.d., presumably d/t platelet drop  Continue to hold Plavix and aspirin and closely monitor CBC (daily)  Echocardiogram showed Efx 40-45%, moderate MV stenosis  TSH normal this admission  Follow electrolytes replacement protocol to keep K>/= 4 and magnesium >/= 2.   Patient has outpatient follow up with EP already scheduled.  Okay to discharge from Cardiology standpoint with follow up in the clinic in 1-2 weeks  Repeat CBC in next few days outpatient      Erika Valdivia NP  [facility]  Department of Cardiology  Date of Service: 10/11/2024

## 2024-10-11 NOTE — PLAN OF CARE
1123 - DEB spoke to RODO [facility]enrostro CM regarding HH for pt.  [facility] stated they would like [facility].  DEB sent referral via careport and  awaiting a SOC date.     1328 - SOC date is 10/14       10/11/24 1123   Post-Acute Status   Post-Acute Authorization Home Health   Home Health Status Referrals Sent   Discharge Delays None known at this time   Discharge Plan   Discharge Plan A Home Health

## 2024-10-11 NOTE — PROGRESS NOTES
Pulmonary/Critical Care Progress Note      PATIENT NAME: Alexa Otero  MRN: [id number]  TODAY'S DATE: 10/11/2024  9:41 AM  ADMIT DATE: 10/7/2024  AGE: 84 y.o. 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  Two weeks ago, shortly after her treatment, she developed severe abdominal pain which is precluding her from eating.  She has become very weak. She has been in paroxysmal A fib at least since  when she was supposed to follow up with her electrophysiologist but hasn't.  I had restarted her amiodarone and eliquis.  Today, she is still in A fib c RVR. Her CXR shows a new L effusion and increased ground glass markings bilaterally.  This is probably due to the AFib with RVR as opposed to a pneumonia which she has no symptoms of or progression of her disease which her markedly improved PET scan of 10/1 would argue against.  The patient occasionally produces sputum but much less now that her cancer has been treated.  She is not produced any purulent sputum.  Will try to get a sputum culture just for completeness sake.    10/9 the patient is tachycardic in the 120s.  She remains in AFib.  She is on 15 mg of Cardizem.  Her appointment with her electrophysiologist is not till .    10/10- doing ok today, alert and awake, plans for cardioversion this am     10/11- feeling improved, cardioverted and now in NSR    REVIEW OF SYSTEMS  GENERAL: Feeling weak  EYES: Vision is good.  ENT: No sinusitis or pharyngitis.   HEART:  She can not feel her atrial fib  LUNGS:  She is coughing up some clear mucus  GI:  The severe abdominal pain she has had for 2 weeks has relented.  She had 1 large diarrheal stool.  She vomited everything she had eaten on .  : No dysuria, hesitancy, or nocturia.  SKIN: No lesions or rashes.  MUSCULOSKELETAL:  Her   is having to help her stand up because she was so weak.  NEURO: No headaches or neuropathy.  LYMPH: No edema or adenopathy.  PSYCH: No anxiety or depression.  ENDO: No weight change.    No change in the patient's Past Medical History, Past Surgical History, Social History or Family History since admission.      VITAL SIGNS (MOST RECENT)  Temp: 97.4 °F (36.3 °C) (10/11/24 0715)  Pulse: 95 (10/11/24 1000)  Resp: 20 (10/11/24 1000)  BP: 124/63 (10/11/24 1000)  SpO2: 100 % (10/11/24 1000)    INTAKE AND OUTPUT (LAST 24 HOURS):  Intake/Output Summary (Last 24 hours) at 10/11/2024 1058  Last data filed at 10/11/2024 0745  Gross per 24 hour   Intake 950 ml   Output 900 ml   Net 50 ml       WEIGHT  Wt Readings from Last 1 Encounters:   10/09/24 54.9 kg (121 lb 0.5 oz)       PHYSICAL EXAM  GENERAL: Older patient in no distress.  HEENT: Pupils equal and reactive. Extraocular movements intact. Nose intact. Pharynx moist.  NECK: Supple.   HEART:  Irregularly irregular and tachycardic rate and rhythm. No murmur or gallop auscultated.  LUNGS:  The right lung has consolidated breath sounds and decreased breath sounds throughout.  There are fine rales in the left base.  Lung excursion symmetrical. No change in fremitus.   ABDOMEN: Bowel sounds present. Non-tender, no masses palpated.  : Normal anatomy.  EXTREMITIES: Normal muscle tone and joint movement, no cyanosis or clubbing.   LYMPHATICS: No adenopathy palpated, no edema.  SKIN: Dry, intact, no lesions.   NEURO: Cranial nerves II-XII intact. Motor strength 5/5 bilaterally, upper and lower extremities.  PSYCH: Appropriate affect      CBC LAST (LAST 24 HOURS)  Recent Labs   Lab 10/11/24  0256   WBC 3.41*   RBC 2.56*   HGB 8.1*   HCT 26.0*   *   MCH 31.6*   MCHC 31.2*   RDW 17.5*   PLT 85*   MPV 10.6   GRAN 57.7  2.0   LYMPH 12.9*  0.4*   MONO 27.9*  1.0   BASO 0.01   NRBC 0       CHEMISTRY LAST (LAST 24 HOURS)  Recent Labs   Lab 10/11/24  0256   *   K 4.1    CL 98   CO2 31*   ANIONGAP 6*   BUN 23   CREATININE 1.2   *   CALCIUM 8.0*   MG 1.8   ALBUMIN 2.6*   PROT 5.8*   ALKPHOS 47*   ALT 16   AST 14   BILITOT 0.4         CARDIAC PROFILE (LAST 24 HOURS)  Recent Labs   Lab 10/07/24  1713   *   TROPONINIHS 13.3       LAST 7 DAYS MICROBIOLOGY   Microbiology Results (last 7 days)       Procedure Component Value Units Date/Time    Blood culture x two cultures. Draw prior to antibiotics. [[id number]] Collected: 10/07/24 1753    Order Status: Completed Specimen: Blood from Peripheral, Hand, Left Updated: 10/10/24 2032     Blood Culture, Routine No Growth to date      No Growth to date      No Growth to date      No Growth to date    Narrative:      Aerobic and anaerobic    Blood culture x two cultures. Draw prior to antibiotics. [[id number]] Collected: 10/07/24 1758    Order Status: Completed Specimen: Blood from Peripheral, Hand, Right Updated: 10/10/24 2032     Blood Culture, Routine No Growth to date      No Growth to date      No Growth to date      No Growth to date    Narrative:      Aerobic and anaerobic    Respiratory Infection Panel (PCR), Nasopharyngeal [[id number]] Collected: 10/07/24 2135    Order Status: Completed Specimen: Nasopharyngeal Swab Updated: 10/07/24 2323     Respiratory Infection Panel Source NP swab     Adenovirus Not Detected     Coronavirus 229E, Common Cold Virus Not Detected     Coronavirus HKU1, Common Cold Virus Not Detected     Coronavirus NL63, Common Cold Virus Not Detected     Coronavirus OC43, Common Cold Virus Not Detected     Comment: Coronavirus strains 229E, HKU1, NL63, and OC43 can cause the common   cold   and are not associated with the respiratory disease outbreak caused   by  the COVID-19 (SARS-CoV-2 novel Coronavirus) strain.           SARS-CoV2 (COVID-19) Qualitative PCR Not Detected     Human Metapneumovirus Not Detected     Human Rhinovirus/Enterovirus Not Detected     Influenza A (subtypes H1, H1-2009,H3) Not  Detected     Influenza B Not Detected     Parainfluenza Virus 1 Not Detected     Parainfluenza Virus 2 Not Detected     Parainfluenza Virus 3 Not Detected     Parainfluenza Virus 4 Not Detected     Respiratory Syncytial Virus Not Detected     Bordetella Parapertussis (UC2972) Not Detected     Bordetella pertussis (ptxP) Not Detected     Chlamydia pneumoniae Not Detected     Mycoplasma pneumoniae Not Detected     Comment: Respiratory Infection Panel testing performed by Multiplex PCR.       Narrative:      Respiratory Infection Panel source->NP Swab            MOST RECENT IMAGING  X-Ray Chest AP Portable  Narrative: EXAMINATION:  XR CHEST AP PORTABLE    CLINICAL HISTORY:  L effusion and mild pulmonary edema;    FINDINGS:  Portable chest at 457 compared with 10/07/2024 shows unchanged cardiomediastinal silhouette. Right subclavian port catheter unchanged.    Pleural-parenchymal opacity affecting the right hemithorax, most prominent superiorly, is unchanged.  Linear opacities in right lower lung zone unchanged.  Reticulonodular opacities throughout the aerated right mid lower lung zone unchanged, with less prominent perihilar interstitial opacities in left lung unchanged.  Central pulmonary vasculature is unchanged.  No pneumothorax.  Degenerative changes affect the spine.  Right upper quadrant surgical clips unchanged.  Impression: No significant change.    Electronically signed by: Rei Carr  Date:    10/10/2024  Time:    07:17      CURRENT VISIT EKG  Results for orders placed or performed during the hospital encounter of 10/07/24   EKG 12-lead   Result Value Ref Range    QRS Duration 66 ms    [facility] QTC Calculation 453 ms    Narrative    Test Reason : R07.9,    Vent. Rate : 137 BPM     Atrial Rate : 000 BPM     P-R Int : 000 ms          QRS Dur : 066 ms      QT Int : 300 ms       P-R-T Axes : 000 009 090 degrees     QTc Int : 453 ms    Atrial fibrillation with rapid ventricular response  Anteroseptal infarct (cited  on or before 13-OCT-2023)  Abnormal ECG  When compared with ECG of 07-OCT-2024 17:03,  No significant change was found    Referred By: [email or web address]   SELF           Confirmed By:        ECHOCARDIOGRAM RESULTS  Results for orders placed during the hospital encounter of 10/13/23    Echo    Interpretation Summary    Left Ventricle: The left ventricle is normal in size. Normal wall thickness. Normal wall motion. There is normal systolic function with a visually estimated ejection fraction of 55 - 60%. Grade I diastolic dysfunction.    Right Ventricle: Normal right ventricular cavity size. Wall thickness is normal. Right ventricle wall motion  is normal. Systolic function is normal.    Aortic Valve: There is moderate aortic valve sclerosis.    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 There is mild mitral annular calcification present. There is no stenosis. The mean pressure gradient across the mitral valve is 3 mmHg at a heart rate of  bpm. There is mild regurgitation.    Tricuspid Valve: There is mild regurgitation.  No pulmonary hypertension.    IVC/SVC: Normal venous pressure at 3 mmHg.        Oxygen  INFORMATION     1 L, the patient wears 2 at home         IMPRESSION AND PLAN  Mild pulmonary edema with new left pleural effusion  - secondary to AFib and moderate malnutrition  - diuresed and improved    Paroxysmal atrial fib s/p VASILIY cardioversion  - continue amiodarone  - continue Eliquis  - now off diltiazem drip  - cardiology following    Moderate malnutrition, moderate hypoalbuminemia  - has not been eating secondary to abdominal pain  Weakness  Lepidic pattern adenocarcinoma  - on Alimta  - improved PET scan  Pancytopenia likely due to chemo  - continue to trend  - transfuse if needed for Hgb goal of >7  - transfuse platelets if actively bleeding or plt <10    - no objection to discharge  - over the weekend please call Dr. Garrett if needed    Sudha Nuñez MD  Date of Service: 10/11/2024   9:41 AM

## 2024-10-12 ENCOUNTER — CLINICAL SUPPORT (OUTPATIENT)
Facility: CLINIC | Age: 84
End: 2024-10-12
Payer: MEDICARE

## 2024-10-12 ENCOUNTER — NURSE TRIAGE (OUTPATIENT)
Dept: ADMINISTRATIVE | Facility: CLINIC | Age: 84
End: 2024-10-12
Payer: MEDICARE

## 2024-10-12 DIAGNOSIS — I80.9 THROMBOPHLEBITIS: Primary | ICD-10-CM

## 2024-10-12 LAB
BACTERIA BLD CULT: NORMAL
BACTERIA BLD CULT: NORMAL

## 2024-10-12 NOTE — PROGRESS NOTES
[facility]-Tele-Medicine- Note       The patient location is:  Home  The chief complaint leading to consultation is:  Left arm redness  Visit type: Virtual visit with synchronous audio and video  Total time spent with patient: 13min  Each patient to whom he or she provides medical services by telemedicine is:  (1) informed of the relationship between the physician and patient and the respective role of any other health care provider with respect to management of the patient; and (2) notified that he or she may decline to receive medical services by telemedicine and may withdraw from such care at any time.    Patient ID: Alexa Otero is a 84 y.o. female.    Chief Complaint:  Left arm redness    The patient initiated a request through [facility] on 10/12/2024 for evaluation and management with a chief complaint of No chief complaint on file.     84-year-old female recently admitted to the hospital for suspected pneumonia, was subsequently diagnosed with AFib in RVR and CHF.  While in the hospital the patient had a left AC IV which was used for multiple medications including vancomycin another antibiotics.  Patient denies any fevers or chills.  She reports redness in the area of the IV, and just slight discomfort.  There is no numbness or tingling distal.  There is no loss of movement distal.    No questionnaires on file.    No diagnosis found.     No orders of the defined types were placed in this encounter.           No follow-ups on file.    Physical Exam:  General appearance: Well appearing, in no acute distress, alert and oriented x3.  Psych:  Mood and affect appropriate.  LUE:  Slight erythema noted at AC previous IV site, no reported tenderness to palpation by patient, full range of motion, and normal visually distally    Assessment:  Thrombophlebitis, I have a low suspicion of cellulitis at this time  Plan:  Cold compresses for 15 minutes q.2 hours   Elevate left arm on pillows  Will report back for any fevers,  worsening pain, or increasing redness  Patient and daughter in agreement with this plan  E-Visit Time Tracking:

## 2024-10-12 NOTE — TELEPHONE ENCOUNTER
[facility] NT PES escalation call -  Pt daughter, Katerine, reports Lt arm near inside elbow slightly swollen, warm to touch. Pt can move arm but Pain 3/10. Daughter reports pt discharged from hospital 10/11/24 possible IV site but pt /daughter unsure. IV site protocol followed and pt advised to see provider with in 4 hours. [facility] provider/team contacted via [facility]. Pt scheduled for VV at 1040 today with [facility] provider.    Encounter routed to PCP   Reason for Disposition   Skin swelling at IV site  (Exception: IV recently removed and has small lump or small amount of skin swelling.)    Additional Information   Pain, redness, swelling, or pus at IV site (current or recent)   Negative: SEVERE difficulty breathing (e.g., struggling for each breath, speaks in single words)   Negative: Shock suspected (e.g., cold/pale/clammy skin, too weak to stand, low BP, rapid pulse)   Negative: Cracked or broken central line (e.g., Broviac, Trevizo, Port) or PICC line   Negative: Sounds like a life-threatening emergency to the triager   Negative: IV not running or running slowly   Negative: [1] Chest pain AND [2] has central line (e.g., Broviac, Trevizo, Port) or PICC line   Negative: Moderate bleeding around IV site  (Exception: Mild bleeding that stops quickly with direct pressure.)   Negative: [1] Difficulty breathing AND [2] has central line (e.g., Broviac, Trevizo, Port) or PICC line   Negative: [1] Chest or neck swelling AND [2] has a central or PICC line  (Exception: Mild puffiness or swelling just around IV site.)   Negative: [1] Arm or leg swelling AND [2] has a central or PICC line  (Exception: Mild puffiness or swelling just around IV site.)   Negative: Fever > 100.4 F (38.0 C)   Negative: Patient sounds very sick or weak to the triager   Negative: Central line (e.g., Broviac, Trevizo, Port) or PICC line has moved out of position (or can see cuff slipping out of skin; or more line externally exposed than before)   Negative: Pus or cloudy  fluid from IV site   Negative: [1] Red streak at IV site AND [2] palpable cord   Negative: [1] Red streak at IV site AND [2] longer than 1 inch (2.5 cm)   Negative: [1] Skin redness AND [2] extends > 1 inch (2.5 cm) from IV site    Protocols used: Post-Hospitalization Follow-up Call-A-, IV Site and Other Symptoms-A-AH

## 2024-10-14 ENCOUNTER — PATIENT OUTREACH (OUTPATIENT)
Dept: ADMINISTRATIVE | Facility: CLINIC | Age: 84
End: 2024-10-14
Payer: MEDICARE

## 2024-10-14 ENCOUNTER — TELEPHONE (OUTPATIENT)
Dept: PULMONOLOGY | Facility: CLINIC | Age: 84
End: 2024-10-14
Payer: MEDICARE

## 2024-10-14 NOTE — PROGRESS NOTES
[facility] Post Discharge Call made non-applicable due to pt undergoing current chemotherapy regimen, last infusion was 9/30/24 and next infusion scheduled for 10/21/24. No messages routed at this time.

## 2024-10-14 NOTE — TELEPHONE ENCOUNTER
----- Message from Amira sent at 10/14/2024  9:22 AM CDT -----  Regarding: Follow up appt  Patient says she just got released from the hospital and was told to schedule a follow up appt with  but she doesn't remember what she needs to be seen for. She would like us to remind her of that when we call to schedule please   CB#[phone]

## 2024-10-17 ENCOUNTER — TELEPHONE (OUTPATIENT)
Facility: CLINIC | Age: 84
End: 2024-10-17
Payer: MEDICARE

## 2024-10-17 ENCOUNTER — HOSPITAL ENCOUNTER (OUTPATIENT)
Dept: RADIOLOGY | Facility: HOSPITAL | Age: 84
Discharge: HOME | End: 2024-10-17
Attending: NURSE PRACTITIONER
Payer: MEDICARE

## 2024-10-17 ENCOUNTER — OFFICE VISIT (OUTPATIENT)
Dept: FAMILY MEDICINE | Facility: CLINIC | Age: 84
End: 2024-10-17
Payer: MEDICARE

## 2024-10-17 VITALS
HEIGHT: 64 IN | DIASTOLIC BLOOD PRESSURE: 60 MMHG | HEART RATE: 70 BPM | BODY MASS INDEX: 20.78 KG/M2 | TEMPERATURE: 98 F | SYSTOLIC BLOOD PRESSURE: 120 MMHG | OXYGEN SATURATION: 98 %

## 2024-10-17 DIAGNOSIS — E46 PROTEIN-CALORIE MALNUTRITION, UNSPECIFIED SEVERITY: ICD-10-CM

## 2024-10-17 DIAGNOSIS — Z99.81 OXYGEN DEPENDENT: ICD-10-CM

## 2024-10-17 DIAGNOSIS — Z09 HOSPITAL DISCHARGE FOLLOW-UP: Primary | ICD-10-CM

## 2024-10-17 DIAGNOSIS — N18.30 CONTROLLED TYPE 2 DIABETES MELLITUS WITH STAGE 3 CHRONIC KIDNEY DISEASE, WITHOUT LONG-TERM CURRENT USE OF INSULIN: ICD-10-CM

## 2024-10-17 DIAGNOSIS — C34.91 MALIGNANT NEOPLASM OF RIGHT LUNG STAGE 4: ICD-10-CM

## 2024-10-17 DIAGNOSIS — D61.818 PANCYTOPENIA: ICD-10-CM

## 2024-10-17 DIAGNOSIS — R10.9 ABDOMINAL PAIN, UNSPECIFIED ABDOMINAL LOCATION: ICD-10-CM

## 2024-10-17 DIAGNOSIS — I48.91 ATRIAL FIBRILLATION, UNSPECIFIED TYPE: ICD-10-CM

## 2024-10-17 DIAGNOSIS — E11.22 CONTROLLED TYPE 2 DIABETES MELLITUS WITH STAGE 3 CHRONIC KIDNEY DISEASE, WITHOUT LONG-TERM CURRENT USE OF INSULIN: ICD-10-CM

## 2024-10-17 DIAGNOSIS — R11.2 NAUSEA AND VOMITING, UNSPECIFIED VOMITING TYPE: ICD-10-CM

## 2024-10-17 DIAGNOSIS — E78.5 HYPERLIPIDEMIA, UNSPECIFIED HYPERLIPIDEMIA TYPE: ICD-10-CM

## 2024-10-17 DIAGNOSIS — R53.83 FATIGUE, UNSPECIFIED TYPE: ICD-10-CM

## 2024-10-17 PROCEDURE — 25500020 PHARM REV CODE 255

## 2024-10-17 PROCEDURE — 1111F DSCHRG MED/CURRENT MED MERGE: CPT | Mod: HCNC,CPTII,S$GLB, | Performed by: NURSE PRACTITIONER

## 2024-10-17 PROCEDURE — 74177 CT ABD & PELVIS W/CONTRAST: CPT | Mod: TC

## 2024-10-17 PROCEDURE — 3074F SYST BP LT 130 MM HG: CPT | Mod: HCNC,CPTII,S$GLB, | Performed by: NURSE PRACTITIONER

## 2024-10-17 PROCEDURE — 1160F RVW MEDS BY RX/DR IN RCRD: CPT | Mod: HCNC,CPTII,S$GLB, | Performed by: NURSE PRACTITIONER

## 2024-10-17 PROCEDURE — 1157F ADVNC CARE PLAN IN RCRD: CPT | Mod: HCNC,CPTII,S$GLB, | Performed by: NURSE PRACTITIONER

## 2024-10-17 PROCEDURE — 99999 PR PBB SHADOW E&M-EST. PATIENT-LVL IV: CPT | Mod: PBBFAC,HCNC,, | Performed by: NURSE PRACTITIONER

## 2024-10-17 PROCEDURE — 99214 OFFICE O/P EST MOD 30 MIN: CPT | Mod: HCNC,S$GLB,, | Performed by: NURSE PRACTITIONER

## 2024-10-17 PROCEDURE — 74177 CT ABD & PELVIS W/CONTRAST: CPT | Mod: 26,,, | Performed by: RADIOLOGY

## 2024-10-17 PROCEDURE — 3078F DIAST BP <80 MM HG: CPT | Mod: HCNC,CPTII,S$GLB, | Performed by: NURSE PRACTITIONER

## 2024-10-17 PROCEDURE — 1101F PT FALLS ASSESS-DOCD LE1/YR: CPT | Mod: HCNC,CPTII,S$GLB, | Performed by: NURSE PRACTITIONER

## 2024-10-17 PROCEDURE — 3288F FALL RISK ASSESSMENT DOCD: CPT | Mod: HCNC,CPTII,S$GLB, | Performed by: NURSE PRACTITIONER

## 2024-10-17 PROCEDURE — 1159F MED LIST DOCD IN RCRD: CPT | Mod: HCNC,CPTII,S$GLB, | Performed by: NURSE PRACTITIONER

## 2024-10-17 PROCEDURE — 1126F AMNT PAIN NOTED NONE PRSNT: CPT | Mod: HCNC,CPTII,S$GLB, | Performed by: NURSE PRACTITIONER

## 2024-10-17 RX ADMIN — IOHEXOL 75 ML: 350 INJECTION, SOLUTION INTRAVENOUS at 05:10

## 2024-10-17 NOTE — TELEPHONE ENCOUNTER
----- Message from Rissa sent at 10/17/2024  3:15 PM CDT -----  Pt's  calling to ask if he could cancel chemo on Monday    Cb [phone]

## 2024-10-17 NOTE — TELEPHONE ENCOUNTER
Called patient on regard to request, Dr. Cassidy and Patito Gibson NP, made aware. Patient wants to reschedule chemotherapy for the week after 10/28/2024. Message sent to scheduling. Patient will get laboratory work and stated understanding.

## 2024-10-17 NOTE — PROGRESS NOTES
"Subjective:       Patient ID: lAexa Otero is a 84 y.o. female.    Chief Complaint: Abdominal Pain and Fatigue     HPI   85 y/o female patient with medical problems listed below presents for hospital discharge follow up. Patient is here with  and a daughter (Ms. Cook).    Patient Class: IP- Inpatient [facility]   Admission Date: 10/7/2024  Hospital Length of Stay: 4 days  Discharge Date and Time:  10/11/2024 4:19 PM  Attending Physician: Karine Goodrich DO   Discharging Provider: Karine Goodrich DO  Primary Care Provider: Idalia Greco MD     "84-year-old female who presented to the ED from outpatient clinic via EMS with reports of abdominal pain, N/V/D x3 days found to be in atrial fib with RVR. Patient reports that she has stage IV lung cancer and went to her clinic for follow up. She states that she was having and has had severe abdominal pain with NVD since her last chemotherapy. Patient denies any feelings of chest palpitations. Had discussion with patient regarding being placed on a ventilator or having chest compressions. She stated that she did not want to have those things done and she was made a DNR in the system.     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 Loculated right pleural effusion similar to prior. New left pleural effusion and worsening diffuse bilateral airspace opacities interlobular septal thickening concerning for progression of malignancy or superimposed pneumonia. New mesenteric stranding worse along the ascending colon. Could be volume overload or infectious/inflammatory colitis. Severe aortic atheromatous plaque. Blood work performed in ED BUN 28, WBCs 3.29, hemoglobin 10.1, hematocrit 33.8, platelets 171, . Initial troponin 13.3     Hospital Course:   The patient was admitted and placed on a cardizem drip. Echo and cardiology consult were ordered. Her home beta blocker was restarted. She was seen by pulmonology, who did not feel " "patient has pneumonia, and stopped abx. Pulmonology felt pleural effusion was due to afib with RVR. One dose of IV lasix was given. Her kidney function was monitored.  Seen by Cardiology and loading dose of amiodarone started in addition to weaning diltiazem drip.  Underwent VASILIY/CV 10/10 with return to sinus rhythm.  Cardiology recommends:   Continue amiodarone 200 mg BID  Continue Toprol-XL 25 mg daily  Anticoagulated w/ Eliquis, dose reduced to 2.5 mg b.i.d., presumably d/t platelet drop  Continue to hold Plavix and aspirin and closely monitor CBC (daily)  Echocardiogram showed Efx 40-45%, moderate MV stenosis  Okay to discharge from Cardiology standpoint with follow up in the clinic in 1-2 weeks  Repeat CBC in next few days outpatient."     Goals of Care Treatment Preferences:  Code Status: DNR     Living Will: Yes    Chest x-ray 10/10/2024  FINDINGS:  Portable chest at 457 compared with 10/07/2024 shows unchanged cardiomediastinal silhouette. Right subclavian port catheter unchanged.     Pleural-parenchymal opacity affecting the right hemithorax, most prominent superiorly, is unchanged.  Linear opacities in right lower lung zone unchanged.  Reticulonodular opacities throughout the aerated right mid lower lung zone unchanged, with less prominent perihilar interstitial opacities in left lung unchanged.  Central pulmonary vasculature is unchanged.  No pneumothorax.  Degenerative changes affect the spine.  Right upper quadrant surgical clips unchanged.     Impression:   No significant change.       Chest x-ray 10/7/2024  FINDINGS:  There is a right-sided MediPort catheter in similar position.  Cardiac monitoring leads overlie the chest.  There is stable mild rightward deviation of the trachea.  Cardiac silhouette is stable in size and configuration.  There is aortic atherosclerosis.  There is a large loculated right-sided pleural effusion.  There are diffuse patchy airspace opacities throughout the aerated right " lung as well as the left lung which may reflect edema, aspiration, or multifocal infection.  No large volume of left-sided pleural fluid appreciated radiographically.  There are bilateral calcified breast prosthesis present.  No definite pneumothorax identified.  Osseous structures demonstrate degenerative changes.     Impression:   Abnormal chest radiograph as above.    CTA Chest abdomen pelvis 10/7/2024  Impression:     Postsurgical changes of right lower lobectomy.  Loculated right pleural effusion similar to prior.  New left pleural effusion and worsening diffuse bilateral airspace opacities interlobular septal thickening, concerning for progression of malignancy and or superimposed pneumonia.     New mesenteric stranding worse along the ascending colon.  Findings could be due to volume overload or infectious/inflammatory colitis.     Severe aortic atheromatous plaque.  No aortic aneurysm or dissection.  The renal and mesenteric arteries are patent.    PET CT Skull Base to Mid Thigh 10/1/2024  Impression:     1. Further interval improvement in hypermetabolic right lung opacities, with a single hypermetabolic pleural-based opacity in the right lung posteriorly, most likely of infectious or inflammatory etiology.  A follow-up chest CT with IV contrast in 3 months is recommended.  2. Otherwise, no convincing evidence of recurrent neoplasm or metastatic disease.  3. Additional observations as described.       CHANGE how you take these medications       amiodarone 200 MG Tab  Commonly known as: PACERONE  Take 1 tablet (200 mg total) by mouth 2 (two) times daily.  What changed: when to take this      apixaban 2.5 mg Tab  Commonly known as: ELIQUIS  Take 1 tablet (2.5 mg total) by mouth 2 (two) times daily.  What changed:   medication strength  how much to take  additional instructions                CONTINUE taking these medications       folic acid 1 MG tablet  Commonly known as: FOLVITE  Take 1 tablet (1 mg total)  by mouth once daily.      gabapentin 100 MG capsule  Commonly known as: NEURONTIN  Take 1 capsule (100 mg total) by mouth 2 (two) times daily.      HYDROcodone-acetaminophen 5-325 mg per tablet  Commonly known as: NORCO  Take 1 tablet by mouth every 6 (six) hours as needed for Pain.      ICAPS AREDS ORAL  Take 1 capsule by mouth 2 (two) times a day.      LORazepam 1 MG tablet  Commonly known as: ATIVAN  Take 1 tablet (1 mg total) by mouth every 6 (six) hours as needed for Anxiety (insomnia).      magnesium oxide 400 mg (241.3 mg magnesium) tablet  Commonly known as: MAG-OX  Take 1 tablet (400 mg total) by mouth once daily. Patient requested refill      metoprolol succinate 25 MG 24 hr tablet  Commonly known as: TOPROL-XL  Take 1 tablet (25 mg total) by mouth once daily.                STOP taking these medications       aspirin 81 MG EC tablet  Commonly known as: ECOTRIN      benazepriL 40 MG tablet  Commonly known as: LOTENSIN      clopidogreL 75 mg tablet  Commonly known as: PLAVIX      potassium chloride 20 mEq  Commonly known as: K-TAB      promethazine 25 MG tablet  Commonly known as: PHENERGAN         Patient states had an episode of vomiting and diarrhea this Monday but the symptoms did not return since then.  still feels discomfort in mid to b/l low abdomen associated with burping but denies fever, chills. [facility] has pcp appointment next week and likes to do routine labs prior. Patient has  service.     Upper GI endoscopy 12/11/2023  Impression:            - No endoscopic esophageal abnormality to explain                          patient's dysphagia. Esophagus dilated. Dilated.                          - Gastroparesis, idiopathic etiology. Biopsied.                          - Normal examined duodenum.   Recommendation:        - Await pathology results.                          - Use Pepcid (famotidine) 40 mg PO daily for 2                          months.     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     Closed fracture of femur, intertrochanteric, right, with routine healing, subsequent encounter    Right hip pain    Weakness of right lower extremity    Impaired functional mobility and activity tolerance    Maintenance chemotherapy    Atherosclerosis of aorta    History of colon polyps    Hypomagnesemia    Hypokalemia    Cardiopulmonary arrest    Atrial fibrillation    Lung infiltrate    Status post lobectomy of lung    Malignant neoplasm of lower lobe of right lung    Pleural effusion    Epigastric discomfort    Iron deficiency anemia due to chronic blood loss    Primary malignant neoplasm of bronchus of right lower lobe    Oxygen dependent    Thrombocytopenia    Coronary artery disease    Peripheral neuropathy due to chemotherapy    S/P angioplasty with stent    Chronic obstructive pulmonary disease, unspecified COPD type    Sacroiliitis, not elsewhere classified    Bilateral pleural effusion    HTN (hypertension)    Mixed hyperlipidemia    Tachycardia    Malignant neoplasm of right lung stage 4      Review of patient's allergies indicates:  No Known Allergies    Past Surgical History:   Procedure Laterality Date    AUGMENTATION OF BREAST      BRONCHOSCOPY N/A 12/12/2023    Procedure: BRONCHOSCOPY;  Surgeon: Neva Christian MD;  Location: [facility];  Service: ENT;  Laterality: N/A;    BRONCHOSCOPY WITH FLUOROSCOPY Right 05/11/2023    Procedure:  BRONCHOSCOPY, WITH FLUOROSCOPY;  Surgeon: Neva Christian MD;  Location: [facility] ENDO;  Service: Pulmonary;  Laterality: Right;    BRONCHOSCOPY WITH FLUOROSCOPY N/A 12/15/2023    Procedure: BRONCHOSCOPY, WITH FLUOROSCOPY;  Surgeon: Neva Christian MD;  Location: [facility] ENDO;  Service: Pulmonary;  Laterality: N/A;    CATARACT EXTRACTION BILATERAL W/ ANTERIOR VITRECTOMY Bilateral     CHOLECYSTECTOMY      COLONOSCOPY      COLONOSCOPY N/A 04/20/2023    Procedure: COLONOSCOPY;  Surgeon: Sabino Stephenson MD;  Location: [facility];  Service: Endoscopy;  Laterality: N/A;    CORONARY STENT PLACEMENT  10/2023    x 3    ESOPHAGOGASTRODUODENOSCOPY N/A 06/20/2018    Procedure: EGD (ESOPHAGOGASTRODUODENOSCOPY);  Surgeon: Sabino Stephenson MD;  Location: [facility];  Service: Endoscopy;  Laterality: N/A;    ESOPHAGOGASTRODUODENOSCOPY N/A 03/31/2023    Procedure: EGD (ESOPHAGOGASTRODUODENOSCOPY);  Surgeon: Sabino Stephenson MD;  Location: [facility];  Service: Endoscopy;  Laterality: N/A;    ESOPHAGOGASTRODUODENOSCOPY N/A 12/11/2023    Procedure: EGD (ESOPHAGOGASTRODUODENOSCOPY);  Surgeon: Pretty Salgado MD;  Location: [facility];  Service: Endoscopy;  Laterality: N/A;    HYSTERECTOMY  1976    partial    INJECTION OF ANESTHETIC AGENT AROUND MEDIAL BRANCH NERVES INNERVATING LUMBAR FACET JOINT Right 05/10/2023    Procedure: Block-nerve-Lateral-branch-lumbar;  Surgeon: Agustin Robles MD;  Location: [facility];  Service: Pain Management;  Laterality: Right;  L5 and s1,s2 LBB    INJECTION OF ANESTHETIC AGENT AROUND MEDIAL BRANCH NERVES INNERVATING LUMBAR FACET JOINT Right 05/30/2023    Procedure: Block-nerve-medial branch-lumbar;  Surgeon: Agustin Robles MD;  Location: [facility] OR;  Service: Pain Management;  Laterality: Right;  L5, s1 ,s2 LBB #2    INJECTION OF ANESTHETIC AGENT AROUND NERVE Right 10/31/2023    Procedure: INTERCOSTAL NERVE BLOCK;  Surgeon: Washington Shankar MD;  Location: [facility];  Service: Cardiothoracic;  Laterality: Right;     INJECTION, SACROILIAC JOINT Right 01/26/2023    Procedure: INJECTION,SACROILIAC JOINT;  Surgeon: Agustin Robles MD;  Location: [facility] OR;  Service: Pain Management;  Laterality: Right;    INSERTION OF TUNNELED CENTRAL VENOUS CATHETER (CVC) WITH SUBCUTANEOUS PORT Right 10/19/2020    Procedure: INSERTION, PORT-A-CATH;  Surgeon: Misti Mcfarland MD;  Location: [facility] OR;  Service: General;  Laterality: Right;    INTRAMEDULLARY RODDING OF TROCHANTER OF FEMUR Right 11/28/2021    Procedure: INSERTION, INTRAMEDULLARY LUC, FEMUR, TROCHANTER/RIGHT TFN DR FAJARDO NOTIFIED REP;  Surgeon: Kp Fajardo MD;  Location: [facility] OR;  Service: Orthopedics;  Laterality: Right;  SKIP    ROBOT-ASSISTED LAPAROSCOPIC LYMPHADENECTOMY USING DA NELLA XI N/A 09/21/2020    Procedure: XI ROBOTIC LYMPHADENECTOMY-pelvic and kell-aortic;  Surgeon: Altagracia Ray MD;  Location: [facility] OR;  Service: OB/GYN;  Laterality: N/A;    ROBOT-ASSISTED LAPAROSCOPIC OMENTECTOMY USING DA NELLA XI N/A 09/21/2020    Procedure: XI ROBOTIC OMENTECTOMY;  Surgeon: Altagracia Ray MD;  Location: [facility] OR;  Service: OB/GYN;  Laterality: N/A;    ROBOT-ASSISTED LAPAROSCOPIC SALPINGO-OOPHORECTOMY USING DA NELLA XI Bilateral 09/21/2020    Procedure: XI ROBOTIC SALPINGO-OOPHORECTOMY;  Surgeon: Altagracia Ray MD;  Location: [facility] OR;  Service: OB/GYN;  Laterality: Bilateral;    THORACOSCOPIC BIOPSY OF PLEURA Right 10/31/2023    Procedure: VATS, WITH PLEURA BIOPSY;  Surgeon: Washington Shankar MD;  Location: [facility] OR;  Service: Cardiothoracic;  Laterality: Right;  FROZEN SECTION   **KRISTEN-BETOT**    THORACOTOMY Right 10/31/2023    Procedure: THORACOTOMY;  Surgeon: Washington Shankar MD;  Location: [facility] OR;  Service: Cardiothoracic;  Laterality: Right;    UPPER GASTROINTESTINAL ENDOSCOPY          Current Outpatient Medications:     amiodarone (PACERONE) 200 MG Tab, Take 1 tablet (200 mg total) by mouth 2 (two) times daily., Disp: 60 tablet, Rfl: 0    apixaban (ELIQUIS)  "2.5 mg Tab, Take 1 tablet (2.5 mg total) by mouth 2 (two) times daily., Disp: 60 tablet, Rfl: 0    folic acid (FOLVITE) 1 MG tablet, Take 1 tablet (1 mg total) by mouth once daily., Disp: 100 tablet, Rfl: 3    gabapentin (NEURONTIN) 100 MG capsule, Take 1 capsule (100 mg total) by mouth 2 (two) times daily., Disp: 180 capsule, Rfl: 3    HYDROcodone-acetaminophen (NORCO) 5-325 mg per tablet, Take 1 tablet by mouth every 6 (six) hours as needed for Pain., Disp: , Rfl:     magnesium oxide (MAG-OX) 400 mg (241.3 mg magnesium) tablet, Take 1 tablet (400 mg total) by mouth once daily. Patient requested refill, Disp: 90 tablet, Rfl: 3    metoprolol succinate (TOPROL-XL) 25 MG 24 hr tablet, Take 1 tablet (25 mg total) by mouth once daily., Disp: 90 tablet, Rfl: 3    VIT A/VIT C/VIT E/ZINC/COPPER (ICAPS AREDS ORAL), Take 1 capsule by mouth 2 (two) times a day. , Disp: , Rfl:     LORazepam (ATIVAN) 1 MG tablet, Take 1 tablet (1 mg total) by mouth every 6 (six) hours as needed for Anxiety (insomnia)., Disp: 30 tablet, Rfl: 2    Review of Systems   Constitutional:  Positive for fatigue. Negative for chills and fever.   Respiratory:  Negative for cough and shortness of breath.    Cardiovascular:  Negative for chest pain and palpitations.   Gastrointestinal:  Positive for abdominal pain and vomiting.   Neurological:  Negative for dizziness and headaches.       Objective:   /60 (BP Location: Right arm, Patient Position: Sitting)   Pulse 70   Temp 97.9 °F (36.6 °C) (Oral)   Ht 5' 4" (1.626 m)   SpO2 98%   BMI 20.78 kg/m²       Physical Exam  Constitutional:       General: She is not in acute distress.     Appearance: Normal appearance.   HENT:      Head: Atraumatic.   Cardiovascular:      Rate and Rhythm: Normal rate and regular rhythm.      Pulses: Normal pulses.      Heart sounds: Normal heart sounds.   Pulmonary:      Effort: Pulmonary effort is normal.      Breath sounds: Normal breath sounds.   Abdominal:      " General: Abdomen is flat. Bowel sounds are normal.      Palpations: Abdomen is soft.      Tenderness: There is abdominal tenderness.      Comments: +mild tenderness in b/l low abdomen    Neurological:      Mental Status: She is oriented to person, place, and time.         Assessment:       1. Hospital discharge follow-up    2. Fatigue, unspecified type    3. Pancytopenia    4. Abdominal pain, unspecified abdominal location    5. Controlled type 2 diabetes mellitus with stage 3 chronic kidney disease, without long-term current use of insulin    6. Hyperlipidemia, unspecified hyperlipidemia type    7. Nausea and vomiting, unspecified vomiting type    8. Protein-calorie malnutrition, unspecified severity    9. Oxygen dependent    10. Malignant neoplasm of right lung stage 4    11. Atrial fibrillation, unspecified type        Plan:       1. Fatigue, unspecified type  - CBC Auto Differential; Future  - Vitamin B12; Future  - Folate; Future  - Basic Metabolic Panel; Future    2. Pancytopenia  - CBC Auto Differential; Future  - Vitamin B12; Future  - Folate; Future    3. Abdominal pain, unspecified abdominal location  - Urinalysis; Future  - Urinalysis Microscopic; Future  - Urine culture; Future  - CT Abdomen Pelvis W Wo Contrast; Future  - Ambulatory referral/consult to Gastroenterology; Future    4. Controlled type 2 diabetes mellitus with stage 3 chronic kidney disease, without long-term current use of insulin  - Hemoglobin A1C; Future    5. Hyperlipidemia, unspecified hyperlipidemia type  - Lipid Panel; Future    6. Nausea and vomiting, unspecified vomiting type  - CT Abdomen Pelvis W Wo Contrast; Future    7. Protein-calorie malnutrition, unspecified severity  - Vitamin B12; Future  - Folate; Future    8. Oxygen dependent  - On home O2 2 L NC    9. Hospital discharge follow-up (Primary)    10. Malignant neoplasm of right lung stage 4  - Continue to follow up with pulm/onchology     11. Atrial fibrillation,  unspecified type  - Stable and continue to follow up with cardiology and arrhythmia as scheduled     Patient with be reevaluated in  as scheduled  or sooner prem Valdez NP

## 2024-10-18 ENCOUNTER — LAB VISIT (OUTPATIENT)
Dept: LAB | Facility: HOSPITAL | Age: 84
End: 2024-10-18
Attending: FAMILY MEDICINE
Payer: MEDICARE

## 2024-10-18 ENCOUNTER — LAB VISIT (OUTPATIENT)
Dept: LAB | Facility: HOSPITAL | Age: 84
End: 2024-10-18
Attending: NURSE PRACTITIONER
Payer: MEDICARE

## 2024-10-18 DIAGNOSIS — N18.31 CHRONIC KIDNEY DISEASE (CKD) STAGE G3A/A1, MODERATELY DECREASED GLOMERULAR FILTRATION RATE (GFR) BETWEEN 45-59 ML/MIN/1.73 SQUARE METER AND ALBUMINURIA CREATININE RATIO LESS THAN 30 MG/G: Primary | ICD-10-CM

## 2024-10-18 DIAGNOSIS — C34.31 SQUAMOUS CELL CARCINOMA OF BRONCHUS IN RIGHT LOWER LOBE: ICD-10-CM

## 2024-10-18 DIAGNOSIS — R10.9 ABDOMINAL PAIN, UNSPECIFIED ABDOMINAL LOCATION: ICD-10-CM

## 2024-10-18 LAB
BACTERIA #/AREA URNS AUTO: ABNORMAL /HPF
BASOPHILS # BLD AUTO: 0.01 K/UL (ref 0–0.2)
BASOPHILS NFR BLD: 0.1 % (ref 0–1.9)
BILIRUB UR QL STRIP: NEGATIVE
CLARITY UR REFRACT.AUTO: ABNORMAL
COLOR UR AUTO: ABNORMAL
DIFFERENTIAL METHOD BLD: ABNORMAL
EOSINOPHIL # BLD AUTO: 0 K/UL (ref 0–0.5)
EOSINOPHIL NFR BLD: 0.3 % (ref 0–8)
ERYTHROCYTE [DISTWIDTH] IN BLOOD BY AUTOMATED COUNT: 18.4 % (ref 11.5–14.5)
GLUCOSE UR QL STRIP: NEGATIVE
HCT VFR BLD AUTO: 27.2 % (ref 37–48.5)
HGB BLD-MCNC: 8.2 G/DL (ref 12–16)
HGB UR QL STRIP: NEGATIVE
HYALINE CASTS UR QL AUTO: 0 /LPF
IMM GRANULOCYTES # BLD AUTO: 0.09 K/UL (ref 0–0.04)
IMM GRANULOCYTES NFR BLD AUTO: 1 % (ref 0–0.5)
KETONES UR QL STRIP: NEGATIVE
LEUKOCYTE ESTERASE UR QL STRIP: NEGATIVE
LYMPHOCYTES # BLD AUTO: 0.5 K/UL (ref 1–4.8)
LYMPHOCYTES NFR BLD: 5.6 % (ref 18–48)
MCH RBC QN AUTO: 31.9 PG (ref 27–31)
MCHC RBC AUTO-ENTMCNC: 30.1 G/DL (ref 32–36)
MCV RBC AUTO: 106 FL (ref 82–98)
MICROSCOPIC COMMENT: ABNORMAL
MONOCYTES # BLD AUTO: 1.2 K/UL (ref 0.3–1)
MONOCYTES NFR BLD: 12.6 % (ref 4–15)
NEUTROPHILS # BLD AUTO: 7.5 K/UL (ref 1.8–7.7)
NEUTROPHILS NFR BLD: 80.4 % (ref 38–73)
NITRITE UR QL STRIP: NEGATIVE
NRBC BLD-RTO: 0 /100 WBC
PH UR STRIP: 7 [PH] (ref 5–8)
PLATELET # BLD AUTO: 238 K/UL (ref 150–450)
PMV BLD AUTO: 9.8 FL (ref 9.2–12.9)
PROT UR QL STRIP: ABNORMAL
RBC # BLD AUTO: 2.57 M/UL (ref 4–5.4)
RBC #/AREA URNS AUTO: 1 /HPF (ref 0–4)
SP GR UR STRIP: 1.01 (ref 1–1.03)
SQUAMOUS #/AREA URNS AUTO: 0 /HPF
URN SPEC COLLECT METH UR: ABNORMAL
WBC # BLD AUTO: 9.27 K/UL (ref 3.9–12.7)
WBC #/AREA URNS AUTO: 1 /HPF (ref 0–5)
YEAST UR QL AUTO: ABNORMAL

## 2024-10-18 PROCEDURE — 87086 URINE CULTURE/COLONY COUNT: CPT | Mod: HCNC | Performed by: NURSE PRACTITIONER

## 2024-10-18 PROCEDURE — 85025 COMPLETE CBC W/AUTO DIFF WBC: CPT | Performed by: FAMILY MEDICINE

## 2024-10-18 PROCEDURE — 81001 URINALYSIS AUTO W/SCOPE: CPT | Mod: HCNC | Performed by: NURSE PRACTITIONER

## 2024-10-18 PROCEDURE — 87088 URINE BACTERIA CULTURE: CPT | Mod: HCNC | Performed by: NURSE PRACTITIONER

## 2024-10-18 PROCEDURE — 36415 COLL VENOUS BLD VENIPUNCTURE: CPT | Performed by: FAMILY MEDICINE

## 2024-10-18 PROCEDURE — 87186 SC STD MICRODIL/AGAR DIL: CPT | Mod: HCNC | Performed by: NURSE PRACTITIONER

## 2024-10-18 RX ORDER — CYANOCOBALAMIN 1000 UG/ML
1000 INJECTION, SOLUTION INTRAMUSCULAR; SUBCUTANEOUS
OUTPATIENT
Start: 2024-10-28

## 2024-10-18 RX ORDER — SODIUM CHLORIDE 0.9 % (FLUSH) 0.9 %
10 SYRINGE (ML) INJECTION
OUTPATIENT
Start: 2024-10-28

## 2024-10-18 RX ORDER — HEPARIN 100 UNIT/ML
500 SYRINGE INTRAVENOUS
OUTPATIENT
Start: 2024-10-28

## 2024-10-20 ENCOUNTER — TELEPHONE (OUTPATIENT)
Dept: FAMILY MEDICINE | Facility: CLINIC | Age: 84
End: 2024-10-20
Payer: MEDICARE

## 2024-10-20 DIAGNOSIS — R10.9 ABDOMINAL PAIN, UNSPECIFIED ABDOMINAL LOCATION: Primary | ICD-10-CM

## 2024-10-20 DIAGNOSIS — K29.70 GASTRITIS, PRESENCE OF BLEEDING UNSPECIFIED, UNSPECIFIED CHRONICITY, UNSPECIFIED GASTRITIS TYPE: Primary | ICD-10-CM

## 2024-10-20 DIAGNOSIS — R11.2 NAUSEA AND VOMITING, UNSPECIFIED VOMITING TYPE: ICD-10-CM

## 2024-10-20 DIAGNOSIS — K29.70 GASTRITIS, PRESENCE OF BLEEDING UNSPECIFIED, UNSPECIFIED CHRONICITY, UNSPECIFIED GASTRITIS TYPE: ICD-10-CM

## 2024-10-20 RX ORDER — FAMOTIDINE 40 MG/1
40 TABLET, FILM COATED ORAL DAILY
Qty: 30 TABLET | Refills: 2 | Status: SHIPPED | OUTPATIENT
Start: 2024-10-20 | End: 2024-10-24

## 2024-10-21 DIAGNOSIS — N39.0 URINARY TRACT INFECTION WITHOUT HEMATURIA, SITE UNSPECIFIED: Primary | ICD-10-CM

## 2024-10-21 LAB — BACTERIA UR CULT: ABNORMAL

## 2024-10-21 RX ORDER — NITROFURANTOIN 25; 75 MG/1; MG/1
100 CAPSULE ORAL 2 TIMES DAILY
Qty: 14 CAPSULE | Refills: 0 | Status: SHIPPED | OUTPATIENT
Start: 2024-10-21 | End: 2024-10-28

## 2024-10-22 NOTE — PROGRESS NOTES
PROGRESS NOTE    Subjective:       Patient ID: Alexa Otero is a 84 y.o. female.    9/21/2020  Bilateral oophorectomy  Right endometrioid Carcinoma  [id number]  S/p Carbo/Taxol x 6    8/15/2023 PET:  IMPRESSION:  1. Multifocal right lower lobe and right pleural FDG hypermetabolism as described, with associated right lower lobe consolidation. These are nonspecific and could be of infectious or inflammatory etiology in the clinical setting of chronic pneumonia, and or metastatic disease. Correlation with previous bronchoscopy results is needed.  2. Multiple new non hypermetabolic pulmonary nodules in both lungs, which are generally below size resolution for PET, but suspicious for pulmonary metastases.  3. No additional findings of FDG avid metastatic disease.    10/31/2023 RLL Lobectomy:  LUNG SYNOPTIC REPORT   PROCEDURE:     Lobectomy.   SPECIMEN LATERALITY:    Right   TUMOR SITE:     Lower lobe.   TUMOR SIZE:     Cannot be determined, 4.5 cm area of cavitation but    tumor appears to involve most if not all of the resected lobe.   TUMOR FOCALITY:    Single tumor.   HISTOLOGIC TYPE:    Invasive mucinous adenocarcinoma with pneumonic    pattern.   VISCEROPLEURAL INVASION:   Present   LYMPHOVASCULAR INVASION:   Not identified.   SPREAD OF TUMOR THROUGH AIR SPACES:  Present   DIRECT INVASION OF ADJACENT STRUCTURES: No adjacent structures present.   MARGINS:     All margins are uninvolved by tumor, margins examined:         Bronchial, distance of invasion of tumor from closest         margin: cannot be determined.   TREATMENT EFFECT:    No known presurgical therapy.   ADDITIONAL PATHOLOGIC FINDINGS:  Organized pulmonary emboli, pulmonary    infarct.   REGIONAL LYMPH NODES:    NUMBER OF LYMPH NODES INVOLVED:  Zero, number of lymph nodes examined:     1, yue         structures examined: 10 (hilar)   PATHOLOGIC STAGE CLASSIFICATION    OF PRIMARY TUMOR:    pT3     REGIONAL LYMPH NODES:   pN0     Comment: The sections show invasive mucinous adenocarcinoma with    findings that suggest so-called pneumonic pattern. there is diffuse    involvement with tumor present in all the histologic sections, often    with a lepidic pattern suggesting spread through the air spaces. The    reported CT findings of a consolidated appearance correlates with the    histologic findings and the reported clinical presentation of a    year-long cough productive of purulent mucus. Mucus is also a common    presentation of this somewhat uncommon pulmonary malignancy. These    tumors are two complete stages pT3 when there appears to be involvement    of the entire lobe; this appears to be appropriate in this case. There    are also organizing/organized pulmonary emboli within the vasculature,    possibly indicating hypercoagulability of malignancy as can be seen    especially with mucinous tumors. The tissue sampled in block 2F is    likely an infarct related to this. The history of endometrioid    adenocarcinoma is noted, but the current tumor represents a primary    lung malignancy and is unrelated to the prior cancer.     10/13/2023-Echo:  Normal systolic function with EF 55-60%  Grade I diastolic dysfunction    12/10/2023-CT CAP:  Progression of the alveolar consolidation in the right mid and lower lung with moderate size right pleural effusion. There is progression of the airspace disease within the right upper lobe with patchy groundglass opacity left upper lobe and left lung base. Findings are compatible with multifocal.     12/15/2023-Bronchoscopy:  LUNG, RIGHT MIDDLE LOBE, BIOPSY:   - ATYPICAL ADENOMATOUS HYPERPLASIA.   - IN A BACKGROUND OF REACTIVE FIBROSIS AND FIBRIN.     Comment:   Given the patient's history, this lesion is concerning for well    differentiated lepidic type adenocarcinoma but is quantitatively    insufficient (approximately 1 mm) for a definite diagnosis.     Multiple  additional leveled sections were examined.     Carbo/Pemetrexed x 4  1/25/2024-4/11/2024    Maintenance Pemetrexed:  Cycle 6: 7/8/2024  Cycle 7: 8/19/2024  Cycle 8: 9/9/2024  Cycle 9: 9/30/2024  Cycle 10: 10/28/2024- Due      4/23/2024-PET:  1. Interval improvement in hypermetabolic right lung airspace opacities, presumably reflecting infectious or inflammatory etiology.  2. No convincing evidence of recurrent neoplasm or metastatic disease.  3. Interval increased size of right pleural effusion.  4. Additional stable observations as described.    10/1/2024-PET:  Lung opacities improved  No new disease    9/25/2024  Newly diagnosed with afib      Chief Complaint:  Stage IV Lung cancer follow up    History of Present Illness:   Alexa Otero is a 84 y.o. female who presents for follow up of lung cancer.      Patient is doing ok today.  Remains on Alimta maintenance.  She was in the hospital with gastric ulcer. Discussed this could be bleeding and causing recurrent anemia. Patient is on an iron supplement and has dark stool.     Pet scheduled for 10/1/2024 shows improvement of lung opacities and no other active disease.    Family and Social history reviewed and is unchanged from 12/1/2023       Current Outpatient Medications:     amiodarone (PACERONE) 200 MG Tab, Take 1 tablet (200 mg total) by mouth 2 (two) times daily., Disp: 60 tablet, Rfl: 0    apixaban (ELIQUIS) 2.5 mg Tab, Take 1 tablet (2.5 mg total) by mouth 2 (two) times daily., Disp: 60 tablet, Rfl: 0    famotidine (PEPCID) 40 MG tablet, Take 1 tablet (40 mg total) by mouth once daily., Disp: 30 tablet, Rfl: 2    folic acid (FOLVITE) 1 MG tablet, Take 1 tablet (1 mg total) by mouth once daily., Disp: 100 tablet, Rfl: 3    gabapentin (NEURONTIN) 100 MG capsule, Take 1 capsule (100 mg total) by mouth 2 (two) times daily., Disp: 180 capsule, Rfl: 3    HYDROcodone-acetaminophen (NORCO) 5-325 mg per tablet, Take 1 tablet by mouth every 6 (six) hours as needed for  Pain., Disp: , Rfl:     LORazepam (ATIVAN) 1 MG tablet, Take 1 tablet (1 mg total) by mouth every 6 (six) hours as needed for Anxiety (insomnia)., Disp: 30 tablet, Rfl: 2    magnesium oxide (MAG-OX) 400 mg (241.3 mg magnesium) tablet, Take 1 tablet (400 mg total) by mouth once daily. Patient requested refill, Disp: 90 tablet, Rfl: 3    metoprolol succinate (TOPROL-XL) 25 MG 24 hr tablet, Take 1 tablet (25 mg total) by mouth once daily., Disp: 90 tablet, Rfl: 3    nitrofurantoin, macrocrystal-monohydrate, (MACROBID) 100 MG capsule, Take 1 capsule (100 mg total) by mouth 2 (two) times daily. for 7 days, Disp: 14 capsule, Rfl: 0    VIT A/VIT C/VIT E/ZINC/COPPER (ICAPS AREDS ORAL), Take 1 capsule by mouth 2 (two) times a day. , Disp: , Rfl:     Review of Systems   Constitutional:  Positive for malaise/fatigue. Negative for fever.   HENT:  Negative for hearing loss.    Respiratory:  Positive for cough and shortness of breath.    Cardiovascular:  Negative for chest pain and leg swelling.   Gastrointestinal:  Positive for abdominal pain. Negative for blood in stool, diarrhea, nausea and vomiting.   Genitourinary:  Negative for dysuria.   Musculoskeletal:  Positive for myalgias. Negative for back pain.   Skin:  Negative for rash.   Neurological:  Positive for weakness. Negative for headaches.   Psychiatric/Behavioral:  Negative for depression.          Objective:       Physical Examination:     /71   Pulse 79   Temp 98 °F (36.7 °C)   Resp 17   Wt 52.8 kg (116 lb 6.4 oz)   BMI 19.98 kg/m²     Physical Exam  Constitutional:       Appearance: Normal appearance.   HENT:      Head: Normocephalic and atraumatic.   Eyes:      General: No scleral icterus.     Conjunctiva/sclera: Conjunctivae normal.   Cardiovascular:      Rate and Rhythm: Normal rate.   Pulmonary:      Effort: Pulmonary effort is normal.   Abdominal:      General: Abdomen is flat.   Neurological:      General: No focal deficit present.      Mental  Status: She is alert and oriented to person, place, and time.   Psychiatric:         Mood and Affect: Mood normal.         Behavior: Behavior normal.         Thought Content: Thought content normal.         Judgment: Judgment normal.         Labs:   Recent Results (from the past 2 weeks)   CBC W/ AUTO DIFFERENTIAL    Collection Time: 10/23/24 11:23 AM   Result Value Ref Range    WBC 11.40 3.90 - 12.70 K/uL    Hemoglobin 8.7 (L) 12.0 - 16.0 g/dL    Hematocrit 28.8 (L) 37.0 - 48.5 %    Platelets 299 150 - 450 K/uL   CBC Auto Differential    Collection Time: 10/18/24 10:30 AM   Result Value Ref Range    WBC 9.27 3.90 - 12.70 K/uL    Hemoglobin 8.2 (L) 12.0 - 16.0 g/dL    Hematocrit 27.2 (L) 37.0 - 48.5 %    Platelets 238 150 - 450 K/uL   CBC with Automated Differential    Collection Time: 10/11/24  2:56 AM   Result Value Ref Range    WBC 3.41 (L) 3.90 - 12.70 K/uL    Hemoglobin 8.1 (L) 12.0 - 16.0 g/dL    Hematocrit 26.0 (L) 37.0 - 48.5 %    Platelets 85 (L) 150 - 450 K/uL     CMP  Sodium   Date Value Ref Range Status   10/11/2024 135 (L) 136 - 145 mmol/L Final     Potassium   Date Value Ref Range Status   10/11/2024 4.1 3.5 - 5.1 mmol/L Final     Chloride   Date Value Ref Range Status   10/11/2024 98 95 - 110 mmol/L Final     CO2   Date Value Ref Range Status   10/11/2024 31 (H) 23 - 29 mmol/L Final     Glucose   Date Value Ref Range Status   10/11/2024 157 (H) 70 - 110 mg/dL Final     BUN   Date Value Ref Range Status   10/11/2024 23 8 - 23 mg/dL Final     Creatinine   Date Value Ref Range Status   10/11/2024 1.2 0.5 - 1.4 mg/dL Final     Calcium   Date Value Ref Range Status   10/11/2024 8.0 (L) 8.7 - 10.5 mg/dL Final     Total Protein   Date Value Ref Range Status   10/11/2024 5.8 (L) 6.0 - 8.4 g/dL Final     Albumin   Date Value Ref Range Status   10/11/2024 2.6 (L) 3.5 - 5.2 g/dL Final     Total Bilirubin   Date Value Ref Range Status   10/11/2024 0.4 0.1 - 1.0 mg/dL Final     Comment:     For infants and  "newborns, interpretation of results should be based  on gestational age, weight and in agreement with clinical  observations.    Premature Infant recommended reference ranges:  Up to 24 hours.............<8.0 mg/dL  Up to 48 hours............<12.0 mg/dL  3-5 days..................<15.0 mg/dL  6-29 days.................<15.0 mg/dL       Alkaline Phosphatase   Date Value Ref Range Status   10/11/2024 47 (L) 55 - 135 U/L Final     AST   Date Value Ref Range Status   10/11/2024 14 10 - 40 U/L Final     ALT   Date Value Ref Range Status   10/11/2024 16 10 - 44 U/L Final     Anion Gap   Date Value Ref Range Status   10/11/2024 6 (L) 8 - 16 mmol/L Final     eGFR if    Date Value Ref Range Status   04/28/2022 40.6 (A) >60 mL/min/1.73 m^2 Final     eGFR if non    Date Value Ref Range Status   04/28/2022 35.3 (A) >60 mL/min/1.73 m^2 Final     Comment:     Calculation used to obtain the estimated glomerular filtration  rate (eGFR) is the CKD-EPI equation.        Lab Results   Component Value Date    CEA 1.3 07/23/2020     No results found for: "PSA"    Assessment/Plan:     Problem List Items Addressed This Visit          Neuro    Peripheral neuropathy due to chemotherapy       Pulmonary    Chronic obstructive pulmonary disease, unspecified COPD type     Continue portable oxygen            Cardiac/Vascular    Atrial fibrillation     Continue follow up with cardiology as scheduled            Oncology    Malignant neoplasm of lower lobe of right lung - Primary     Continue Alimta next week as scheduled; PET shows continues good response to treatment         Primary malignant neoplasm of bronchus of right lower lobe    Relevant Orders    CT Chest With Contrast    Metastatic malignant neoplasm, unspecified site       GI    Peptic ulcer    Relevant Orders    Occult blood x 1, stool    Occult blood x 1, stool    Occult blood x 1, stool       Discussion:     Follow up in about 3 weeks (around " 11/13/2024) for with Dr. Cassidy and 6 weeks with me.      Electronically signed by Patito Gibson, DEBORAH< APRN, AGNP-C, OCN

## 2024-10-23 ENCOUNTER — OFFICE VISIT (OUTPATIENT)
Dept: PULMONOLOGY | Facility: CLINIC | Age: 84
End: 2024-10-23
Payer: MEDICARE

## 2024-10-23 ENCOUNTER — LAB VISIT (OUTPATIENT)
Dept: LAB | Facility: HOSPITAL | Age: 84
End: 2024-10-23
Attending: NURSE PRACTITIONER
Payer: MEDICARE

## 2024-10-23 ENCOUNTER — OFFICE VISIT (OUTPATIENT)
Facility: CLINIC | Age: 84
End: 2024-10-23
Payer: MEDICARE

## 2024-10-23 VITALS
HEART RATE: 93 BPM | OXYGEN SATURATION: 93 % | SYSTOLIC BLOOD PRESSURE: 170 MMHG | HEIGHT: 64 IN | WEIGHT: 116 LBS | BODY MASS INDEX: 19.81 KG/M2 | DIASTOLIC BLOOD PRESSURE: 70 MMHG

## 2024-10-23 VITALS
HEART RATE: 79 BPM | RESPIRATION RATE: 17 BRPM | BODY MASS INDEX: 19.98 KG/M2 | WEIGHT: 116.38 LBS | DIASTOLIC BLOOD PRESSURE: 71 MMHG | TEMPERATURE: 98 F | SYSTOLIC BLOOD PRESSURE: 137 MMHG

## 2024-10-23 DIAGNOSIS — K27.9 PEPTIC ULCER: ICD-10-CM

## 2024-10-23 DIAGNOSIS — C34.91 BRONCHOGENIC CANCER OF RIGHT LUNG: Primary | ICD-10-CM

## 2024-10-23 DIAGNOSIS — C79.9 METASTATIC MALIGNANT NEOPLASM, UNSPECIFIED SITE: ICD-10-CM

## 2024-10-23 DIAGNOSIS — J44.9 CHRONIC OBSTRUCTIVE PULMONARY DISEASE, UNSPECIFIED COPD TYPE: ICD-10-CM

## 2024-10-23 DIAGNOSIS — C34.31 MALIGNANT NEOPLASM OF LOWER LOBE OF RIGHT LUNG: Primary | ICD-10-CM

## 2024-10-23 DIAGNOSIS — D63.0 ANEMIA IN NEOPLASTIC DISEASE: ICD-10-CM

## 2024-10-23 DIAGNOSIS — C34.31 MALIGNANT NEOPLASM OF LOWER LOBE OF RIGHT LUNG: ICD-10-CM

## 2024-10-23 DIAGNOSIS — C34.31 PRIMARY MALIGNANT NEOPLASM OF BRONCHUS OF RIGHT LOWER LOBE: ICD-10-CM

## 2024-10-23 DIAGNOSIS — T45.1X5A PERIPHERAL NEUROPATHY DUE TO CHEMOTHERAPY: ICD-10-CM

## 2024-10-23 DIAGNOSIS — I48.91 ATRIAL FIBRILLATION, UNSPECIFIED TYPE: ICD-10-CM

## 2024-10-23 DIAGNOSIS — G62.0 PERIPHERAL NEUROPATHY DUE TO CHEMOTHERAPY: ICD-10-CM

## 2024-10-23 LAB
ALBUMIN SERPL BCP-MCNC: 3.2 G/DL (ref 3.5–5.2)
ALP SERPL-CCNC: 51 U/L (ref 55–135)
ALT SERPL W/O P-5'-P-CCNC: 11 U/L (ref 10–44)
ANION GAP SERPL CALC-SCNC: 13 MMOL/L (ref 8–16)
AST SERPL-CCNC: 18 U/L (ref 10–40)
BASOPHILS # BLD AUTO: 0.03 K/UL (ref 0–0.2)
BASOPHILS NFR BLD: 0.3 % (ref 0–1.9)
BILIRUB SERPL-MCNC: 0.3 MG/DL (ref 0.1–1)
BUN SERPL-MCNC: 17 MG/DL (ref 8–23)
CALCIUM SERPL-MCNC: 9 MG/DL (ref 8.7–10.5)
CHLORIDE SERPL-SCNC: 100 MMOL/L (ref 95–110)
CO2 SERPL-SCNC: 27 MMOL/L (ref 23–29)
CREAT SERPL-MCNC: 1.1 MG/DL (ref 0.5–1.4)
DIFFERENTIAL METHOD BLD: ABNORMAL
EOSINOPHIL # BLD AUTO: 0 K/UL (ref 0–0.5)
EOSINOPHIL NFR BLD: 0.4 % (ref 0–8)
ERYTHROCYTE [DISTWIDTH] IN BLOOD BY AUTOMATED COUNT: 19 % (ref 11.5–14.5)
EST. GFR  (NO RACE VARIABLE): 49.9 ML/MIN/1.73 M^2
GLUCOSE SERPL-MCNC: 152 MG/DL (ref 70–110)
HCT VFR BLD AUTO: 28.8 % (ref 37–48.5)
HGB BLD-MCNC: 8.7 G/DL (ref 12–16)
IMM GRANULOCYTES # BLD AUTO: 0.08 K/UL (ref 0–0.04)
IMM GRANULOCYTES NFR BLD AUTO: 0.7 % (ref 0–0.5)
LYMPHOCYTES # BLD AUTO: 0.4 K/UL (ref 1–4.8)
LYMPHOCYTES NFR BLD: 3.6 % (ref 18–48)
MAGNESIUM SERPL-MCNC: 1.8 MG/DL (ref 1.6–2.6)
MCH RBC QN AUTO: 31.5 PG (ref 27–31)
MCHC RBC AUTO-ENTMCNC: 30.2 G/DL (ref 32–36)
MCV RBC AUTO: 104 FL (ref 82–98)
MONOCYTES # BLD AUTO: 1.5 K/UL (ref 0.3–1)
MONOCYTES NFR BLD: 13.2 % (ref 4–15)
NEUTROPHILS # BLD AUTO: 9.3 K/UL (ref 1.8–7.7)
NEUTROPHILS NFR BLD: 81.8 % (ref 38–73)
NRBC BLD-RTO: 0 /100 WBC
PLATELET # BLD AUTO: 299 K/UL (ref 150–450)
PMV BLD AUTO: 9.5 FL (ref 9.2–12.9)
POTASSIUM SERPL-SCNC: 3.6 MMOL/L (ref 3.5–5.1)
PROT SERPL-MCNC: 6.8 G/DL (ref 6–8.4)
RBC # BLD AUTO: 2.76 M/UL (ref 4–5.4)
SODIUM SERPL-SCNC: 140 MMOL/L (ref 136–145)
WBC # BLD AUTO: 11.4 K/UL (ref 3.9–12.7)

## 2024-10-23 PROCEDURE — G2211 COMPLEX E/M VISIT ADD ON: HCPCS | Mod: HCNC,S$GLB,, | Performed by: NURSE PRACTITIONER

## 2024-10-23 PROCEDURE — 1126F AMNT PAIN NOTED NONE PRSNT: CPT | Mod: HCNC,CPTII,S$GLB, | Performed by: NURSE PRACTITIONER

## 2024-10-23 PROCEDURE — 3078F DIAST BP <80 MM HG: CPT | Mod: HCNC,CPTII,S$GLB, | Performed by: INTERNAL MEDICINE

## 2024-10-23 PROCEDURE — 1160F RVW MEDS BY RX/DR IN RCRD: CPT | Mod: HCNC,CPTII,S$GLB, | Performed by: NURSE PRACTITIONER

## 2024-10-23 PROCEDURE — 83735 ASSAY OF MAGNESIUM: CPT | Performed by: NURSE PRACTITIONER

## 2024-10-23 PROCEDURE — 99999 PR PBB SHADOW E&M-EST. PATIENT-LVL IV: CPT | Mod: PBBFAC,HCNC,, | Performed by: NURSE PRACTITIONER

## 2024-10-23 PROCEDURE — 1157F ADVNC CARE PLAN IN RCRD: CPT | Mod: HCNC,CPTII,S$GLB, | Performed by: NURSE PRACTITIONER

## 2024-10-23 PROCEDURE — 36415 COLL VENOUS BLD VENIPUNCTURE: CPT | Performed by: NURSE PRACTITIONER

## 2024-10-23 PROCEDURE — 90653 IIV ADJUVANT VACCINE IM: CPT | Mod: HCNC,S$GLB,, | Performed by: INTERNAL MEDICINE

## 2024-10-23 PROCEDURE — G0008 ADMIN INFLUENZA VIRUS VAC: HCPCS | Mod: HCNC,S$GLB,, | Performed by: INTERNAL MEDICINE

## 2024-10-23 PROCEDURE — 1111F DSCHRG MED/CURRENT MED MERGE: CPT | Mod: HCNC,CPTII,S$GLB, | Performed by: NURSE PRACTITIONER

## 2024-10-23 PROCEDURE — 3078F DIAST BP <80 MM HG: CPT | Mod: HCNC,CPTII,S$GLB, | Performed by: NURSE PRACTITIONER

## 2024-10-23 PROCEDURE — 1159F MED LIST DOCD IN RCRD: CPT | Mod: HCNC,CPTII,S$GLB, | Performed by: NURSE PRACTITIONER

## 2024-10-23 PROCEDURE — 85025 COMPLETE CBC W/AUTO DIFF WBC: CPT | Performed by: NURSE PRACTITIONER

## 2024-10-23 PROCEDURE — 99999 PR PBB SHADOW E&M-EST. PATIENT-LVL III: CPT | Mod: PBBFAC,HCNC,, | Performed by: INTERNAL MEDICINE

## 2024-10-23 PROCEDURE — 1101F PT FALLS ASSESS-DOCD LE1/YR: CPT | Mod: HCNC,CPTII,S$GLB, | Performed by: NURSE PRACTITIONER

## 2024-10-23 PROCEDURE — 99215 OFFICE O/P EST HI 40 MIN: CPT | Mod: HCNC,S$GLB,, | Performed by: NURSE PRACTITIONER

## 2024-10-23 PROCEDURE — 1157F ADVNC CARE PLAN IN RCRD: CPT | Mod: HCNC,CPTII,S$GLB, | Performed by: INTERNAL MEDICINE

## 2024-10-23 PROCEDURE — 80053 COMPREHEN METABOLIC PANEL: CPT | Performed by: NURSE PRACTITIONER

## 2024-10-23 PROCEDURE — 3077F SYST BP >= 140 MM HG: CPT | Mod: HCNC,CPTII,S$GLB, | Performed by: INTERNAL MEDICINE

## 2024-10-23 PROCEDURE — 3075F SYST BP GE 130 - 139MM HG: CPT | Mod: HCNC,CPTII,S$GLB, | Performed by: NURSE PRACTITIONER

## 2024-10-23 PROCEDURE — 1126F AMNT PAIN NOTED NONE PRSNT: CPT | Mod: HCNC,CPTII,S$GLB, | Performed by: INTERNAL MEDICINE

## 2024-10-23 PROCEDURE — 1111F DSCHRG MED/CURRENT MED MERGE: CPT | Mod: HCNC,CPTII,S$GLB, | Performed by: INTERNAL MEDICINE

## 2024-10-23 PROCEDURE — 99214 OFFICE O/P EST MOD 30 MIN: CPT | Mod: HCNC,S$GLB,, | Performed by: INTERNAL MEDICINE

## 2024-10-23 PROCEDURE — 3288F FALL RISK ASSESSMENT DOCD: CPT | Mod: HCNC,CPTII,S$GLB, | Performed by: NURSE PRACTITIONER

## 2024-10-24 ENCOUNTER — OFFICE VISIT (OUTPATIENT)
Dept: FAMILY MEDICINE | Facility: CLINIC | Age: 84
End: 2024-10-24
Payer: MEDICARE

## 2024-10-24 DIAGNOSIS — N18.31 CKD STAGE 3A, GFR 45-59 ML/MIN: ICD-10-CM

## 2024-10-24 DIAGNOSIS — R10.13 EPIGASTRIC ABDOMINAL PAIN: ICD-10-CM

## 2024-10-24 DIAGNOSIS — N18.30 CONTROLLED TYPE 2 DIABETES MELLITUS WITH STAGE 3 CHRONIC KIDNEY DISEASE, WITHOUT LONG-TERM CURRENT USE OF INSULIN: Primary | ICD-10-CM

## 2024-10-24 DIAGNOSIS — C56.1 CARCINOMA OF RIGHT OVARY: ICD-10-CM

## 2024-10-24 DIAGNOSIS — E78.5 HYPERLIPIDEMIA, UNSPECIFIED HYPERLIPIDEMIA TYPE: ICD-10-CM

## 2024-10-24 DIAGNOSIS — R10.9 ABDOMINAL PAIN, UNSPECIFIED ABDOMINAL LOCATION: ICD-10-CM

## 2024-10-24 DIAGNOSIS — Z95.820 S/P ANGIOPLASTY WITH STENT: ICD-10-CM

## 2024-10-24 DIAGNOSIS — E11.22 CONTROLLED TYPE 2 DIABETES MELLITUS WITH STAGE 3 CHRONIC KIDNEY DISEASE, WITHOUT LONG-TERM CURRENT USE OF INSULIN: Primary | ICD-10-CM

## 2024-10-24 DIAGNOSIS — I25.10 CORONARY ARTERY DISEASE, UNSPECIFIED VESSEL OR LESION TYPE, UNSPECIFIED WHETHER ANGINA PRESENT, UNSPECIFIED WHETHER NATIVE OR TRANSPLANTED HEART: ICD-10-CM

## 2024-10-24 DIAGNOSIS — K21.9 GASTROESOPHAGEAL REFLUX DISEASE, UNSPECIFIED WHETHER ESOPHAGITIS PRESENT: ICD-10-CM

## 2024-10-24 DIAGNOSIS — I48.91 ATRIAL FIBRILLATION, UNSPECIFIED TYPE: ICD-10-CM

## 2024-10-24 DIAGNOSIS — Z51.11 MAINTENANCE CHEMOTHERAPY: ICD-10-CM

## 2024-10-24 DIAGNOSIS — C34.91 MALIGNANT NEOPLASM OF RIGHT LUNG STAGE 4: ICD-10-CM

## 2024-10-24 DIAGNOSIS — E55.9 VITAMIN D DEFICIENCY DISEASE: ICD-10-CM

## 2024-10-24 PROCEDURE — 99999 PR PBB SHADOW E&M-EST. PATIENT-LVL III: CPT | Mod: PBBFAC,HCNC,, | Performed by: FAMILY MEDICINE

## 2024-10-24 RX ORDER — SUCRALFATE 1 G/10ML
1 SUSPENSION ORAL 4 TIMES DAILY
Qty: 473 ML | Refills: 5 | Status: SHIPPED | OUTPATIENT
Start: 2024-10-24

## 2024-10-24 RX ORDER — PANTOPRAZOLE SODIUM 40 MG/1
40 TABLET, DELAYED RELEASE ORAL DAILY
Qty: 90 TABLET | Refills: 3 | Status: SHIPPED | OUTPATIENT
Start: 2024-10-24 | End: 2025-10-24

## 2024-10-24 NOTE — PROGRESS NOTES
Subjective:       Patient ID: Alexa Otero is a 84 y.o. female.    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     Closed fracture of femur, intertrochanteric, right, with routine healing, subsequent encounter    Right hip pain    Weakness of right lower extremity    Impaired functional mobility and activity tolerance    Maintenance chemotherapy    Atherosclerosis of aorta    History of colon polyps    Hypomagnesemia    Hypokalemia    Cardiopulmonary arrest    Atrial fibrillation    Lung infiltrate    Status post lobectomy of lung    Malignant neoplasm of lower lobe of right lung    Pleural effusion    Epigastric discomfort    Iron deficiency anemia due to chronic blood loss    Primary malignant neoplasm of bronchus of right lower lobe    Oxygen dependent    Thrombocytopenia    Coronary artery disease    Peripheral neuropathy due to chemotherapy    S/P angioplasty with stent    Chronic obstructive pulmonary disease, unspecified COPD type    Sacroiliitis, not elsewhere classified    Bilateral pleural effusion    HTN (hypertension)    Mixed hyperlipidemia    Tachycardia    Malignant neoplasm of right lung stage 4    Peptic ulcer    Metastatic malignant neoplasm, unspecified site    Acute on chronic HFrEF (heart failure with reduced ejection fraction)     Patient is here for a chronic conditions follow up.    Reviewed labs 10/2024  Anemia stable H/H 8.7/28.8   History of Present  Illness    CHIEF COMPLAINT:  Ms. Otero presents today for follow-up on maintenance chemotherapy for lung cancer.    LUNG CANCER:  She reports continuing maintenance chemotherapy for lung cancer with good response to treatment, as evidenced by improvement on recent PET scan.    ATRIAL FIBRILLATION:  She recently experienced an episode of atrial fibrillation with rapid ventricular response, resulting in hospitalization. Cardioversion was performed after initial medication attempts were unsuccessful. She was started on amiodarone and restarted on Eliquis 2.5 mg twice daily. She reports no current symptoms related to atrial fibrillation and appears to have converted to normal sinus rhythm, though consistency is uncertain. Her heart rate is currently well-controlled.    GASTROINTESTINAL:  She reports ongoing stomach issues, including current soreness in the stomach area. A CT scan revealed thickening in the stomach wall, potentially indicative of an ulcer. She is currently taking famotidine for stomach symptoms and denies experiencing any blood in stool or black tarry stools.    FATIGUE AND WEAKNESS:  She reports extreme fatigue and weakness following physical therapy two days ago. She was managing during the therapy session but felt significantly worse the following day, describing inability to hold her head up due to weakness and tiredness. She notes some improvement today but still feels fatigued. She denies pain associated with this episode.    RESPIRATORY:  She uses supplemental oxygen due to respiratory difficulties. She experiences increased difficulty with physical activities while on oxygen, attributed to reduced lung ventilation capacity. She acknowledges fluid in the lower portion of her lungs, which was a concern during her recent hospitalization. The pulmonologist determined it was likely fluid accumulation due to cardiac distress and her recent surgery rather than pneumonia. She reports that oxygen therapy  makes breathing more difficult due to reduced lung ventilation.    VACCINATIONS:  She recently received a flu vaccine and expresses interest in obtaining a COVID booster, which she has not yet received but is aware she needs.    MEDICATIONS:  Current medications include Eliquis 2.5 mg twice daily, amiodarone, and famotidine.      ROS:  ROS findings as noted in HPI. Seen for abd pain NP Dayna 10/2024 . CT abd 10/17/2024 showed  Gastric wall thickening could relate to inadequate distension.  Gastritis or peptic ulcer disease are some additional possibilities in the appropriate clinical setting.  2. Loculated right pleural effusion, small left pleural effusion and patchy bibasilar airspace disease all similar to the recent comparison.  Differential considerations for the airspace disease include post treatment change, edema, pneumonia, residual malignancy.  Reference recent PET.  3. Unchanged hypodense lesions in the liver.  On pepcid 40mg.  Has appt GI 10/28/24   Has [facility].       History of Present Illness    CHIEF COMPLAINT:  Ms. Otero presents today for follow-up on maintenance chemotherapy for lung cancer.    LUNG CANCER:  She reports continuing maintenance chemotherapy for lung cancer with good response to treatment, as evidenced by improvement on recent PET scan.    ATRIAL FIBRILLATION:  She recently experienced an episode of atrial fibrillation with rapid ventricular response, resulting in hospitalization. Cardioversion was performed after initial medication attempts were unsuccessful. She was started on amiodarone and restarted on Eliquis 2.5 mg twice daily. She reports no current symptoms related to atrial fibrillation and appears to have converted to normal sinus rhythm, though consistency is uncertain. Her heart rate is currently well-controlled.    GASTROINTESTINAL:  She reports ongoing stomach issues, including current soreness in the stomach area. A CT scan revealed thickening in the stomach wall,  potentially indicative of an ulcer. She is currently taking famotidine for stomach symptoms and denies experiencing any blood in stool or black tarry stools.    FATIGUE AND WEAKNESS:  She reports extreme fatigue and weakness following physical therapy two days ago. She was managing during the therapy session but felt significantly worse the following day, describing inability to hold her head up due to weakness and tiredness. She notes some improvement today but still feels fatigued. She denies pain associated with this episode.    RESPIRATORY:  She uses supplemental oxygen due to respiratory difficulties. She experiences increased difficulty with physical activities while on oxygen, attributed to reduced lung ventilation capacity. She acknowledges fluid in the lower portion of her lungs, which was a concern during her recent hospitalization. The pulmonologist determined it was likely fluid accumulation due to cardiac distress and her recent surgery rather than pneumonia. She reports that oxygen therapy makes breathing more difficult due to reduced lung ventilation.    VACCINATIONS:  She recently received a flu vaccine and expresses interest in obtaining a COVID booster, which she has not yet received but is aware she needs.    MEDICATIONS:  Current medications include Eliquis 2.5 mg twice daily, amiodarone, and famotidine.      ROS:  ROS findings as noted in HPI.       Review of Systems   Constitutional:  Positive for fatigue. Negative for unexpected weight change.   Respiratory:  Negative for chest tightness and shortness of breath.    Cardiovascular:  Negative for chest pain, palpitations and leg swelling.   Gastrointestinal:  Positive for abdominal pain.   Musculoskeletal:  Negative for arthralgias.   Neurological:  Negative for dizziness, syncope, light-headedness and headaches.      Relevant History:  85 y/o female with past med h/o ovairan endometrioid ca s/p excision/chemo 2021, currently undergoing  treatment for bronchogenic lung ca. ,CAD s/p stents, afib and cardiac arrest/vfib after VATS 10/2023 with lobectomy, type 2 DM       Nephro CKD stage 3, mild anemia     Pulm Dr. Christian doing chemo currently for bronchogenic ca/mucinous adenoca lung . Has chronic hypoxia requiring oxygen per NC 2 L   Heme/onc Dr. Cassidy and gyn onc Dr. Ray -      Underwent right SO 9/20 which path revealed right ovarian endometrioid ca.  . completed chemo 2/2021  Right lung cancer s/p lobectomy diagnosed 2023 (see HPI)  Pet scan 10/24  1. Further interval improvement in hypermetabolic right lung opacities, with a single hypermetabolic pleural-based opacity in the right lung posteriorly, most likely of infectious or inflammatory etiology.  A follow-up chest CT with IV contrast in 3 months is recommended.  2. Otherwise, no convincing evidence of recurrent neoplasm or metastatic disease.  Currently on Alimta with good response     Card Dr. Julio/ [facility]io CAD s/p prior PCI of the LAD, LCX, and RCA in October 2023 , HPL. Afib after VATS. On pacerone , ASA, plavix and toprol. S/p angiogram with stents placed 10/2023 complicated by vfib and CP arrest after stent possibly due to low mag.   Was on eliquis but now asa and plavix   Admitted [facility] 10/11/24 for afib with RVR. Started on cardizem drip and b blocker restarted. Had pleural effusion since lung sx. Pulm did not feel this was infectious pneumonia related but due to afib with RVR. Lasix given and amiodarone started.  Underwent VASILIY/CV 10/10 with return to sinus rhythm. Sent home on amiodarone 200mg bid , toprol XL 25 mg daily, eliquis 2.5 mg bid . Plavix held.      GI Dr. Stephenson  colonoscopy 4/23 -2 polyps 1 tubular adenoma  EGD 3/23 -7 gastric polyps and small hiatal hernia, gastritis-path benign . H pylori neg.  Pepcid d/c'd and prilosec added 12/23  GI Dr. Stephenson treating GERD, h/o barretts diagnosed 2015 Dr. Jimenez.  treated with HALO and Stretta by Dr. Samuel.        Pain mgmt  Dr. Robles treating SI joint pain (s/p injection 1/23) and lumbar ddd and spondylosis s/p block 5/23.  C/o persistent pain  Right hip-can't sleep at night. Continuing to do PT. Taking gabapentin 100mg bid , mobic 15mg daily. Steps, walking and lying on that side the worst   h/o right hip fx 2021          Heme/onc Dr. Cassidy and gyn onc Dr. Ray - see above     Underwent right SO 9/20 which path revealed right ovarian endometrioid ca.  . completed chemo 2/2021  Right lung cancer s/p lobectomy diagnosed 2023 (see HPI)      Endo Type 2 DM- a1c 5.3 11/23 urine micro neg, lipids at goal. On ACE I and zocor. Dks tage 3      Eye Dr. Diehl -[facility]     Podiatry Dr. Ding DM neuropathy 6/23        Ortho knee pain- Dr. Mojica started on mobic 3/10/20. ? CHERELLE     Objective:      Physical Exam  Vitals and nursing note reviewed.   Constitutional:       Appearance: She is well-developed.   Cardiovascular:      Rate and Rhythm: Normal rate and regular rhythm.      Heart sounds: Normal heart sounds.   Pulmonary:      Effort: Pulmonary effort is normal.      Breath sounds: Normal breath sounds.   Skin:     General: Skin is warm and dry.   Neurological:      Mental Status: She is alert and oriented to person, place, and time.         Assessment:       ICD-10-CM ICD-9-CM    1. Controlled type 2 diabetes mellitus with stage 3 chronic kidney disease, without long-term current use of insulin  E11.22 250.40 Comprehensive Metabolic Panel    N18.30 585.3 Hemoglobin A1C      2. Hyperlipidemia, unspecified hyperlipidemia type  E78.5 272.4 Lipid Panel      3. Malignant neoplasm of right lung stage 4  C34.91 162.9       4. Abdominal pain, unspecified abdominal location  R10.9 789.00       5. 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 CKD stage 3a, GFR 45-59 ml/min  N18.31 585.3       7. Gastroesophageal reflux disease, unspecified whether esophagitis present  K21.9 530.81 pantoprazole (PROTONIX) 40 MG tablet       8. Maintenance chemotherapy  Z51.11 V58.11       9. Atrial fibrillation, unspecified type  I48.91 427.31       10. Coronary artery disease, unspecified vessel or lesion type, unspecified whether angina present, unspecified whether native or transplanted heart  I25.10 414.00       11. S/P angioplasty with stent  Z95.820 V45.89       12. Vitamin D deficiency disease  E55.9 268.9 Vitamin D      13. Epigastric abdominal pain  R10.13 789.06 sucralfate (CARAFATE) 100 mg/mL suspension         Plan:   1. Controlled type 2 diabetes mellitus with stage 3 chronic kidney disease, without long-term current use of insulin (Primary)  Stable condition.  Continue current medications.  Will adjust based on lab findings or if condition changes.      - Comprehensive Metabolic Panel; Future  - Hemoglobin A1C; Future    2. Hyperlipidemia, unspecified hyperlipidemia type  I recommend a heart healthy diet rich in fiber, fresh vegetables and fruit and low in saturated fats (fried foods, red meat, etc.).  I also recommend regular exercise including a minimum of 150 minutes of cardio exercise per week and 2-30 minute workouts of strength training like light weights, yoga, pilates, etc.  You should work toward a body mass index of < 25.      - Lipid Panel; Future    3. Malignant neoplasm of right lung stage 4  Cont onc mgmt     4. Abdominal pain, unspecified abdominal location  D/c pepcid. Add protonix 40mg daily. Add carafate 1 g qac and hs. F/u GI as planned.  EGD may be needed    5. 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 Serial pet    6. CKD stage 3a, GFR 45-59 ml/min  Stable and chronic.  Will continue to monitor q3-6 months and control chronic conditions as optimally as possible to preserve function.      7. Gastroesophageal reflux disease, unspecified whether esophagitis present  See above  - pantoprazole (PROTONIX) 40 MG tablet; Take 1 tablet (40 mg total) by mouth once daily.  Dispense: 90 tablet;  Refill: 3    8. Maintenance chemotherapy  Cont onc monitoring    9. Atrial fibrillation, unspecified type  Cont elquis and amiodarone.  Cont card monitoring    10. Coronary artery disease, unspecified vessel or lesion type, unspecified whether angina present, unspecified whether native or transplanted heart  Cont card monitoring    11. S/P angioplasty with stent  Cont current mgmt    12. Vitamin D deficiency disease  Screen and treat as indicated:    - Vitamin D; Future    13. Epigastric abdominal pain  See above. Monitor for worsening symptoms and return to clinic or go to the ER if these occur: fever > 100.4, severe abdominal pain, intractable vomiting, bleeding from the rectum or black tarry stools, dehydration, lethargy or other worsening symptoms.    - sucralfate (CARAFATE) 100 mg/mL suspension; Take 10 mLs (1 g total) by mouth 4 (four) times daily.  Dispense: 473 mL; Refill: 5  Assessment & Plan    - Emphasized the importance of rest when feeling fatigued or weak due to multiple ongoing health issues.  - Discussed the potential long-term side effects of amiodarone, including thyroid and eye problems, but emphasized its current necessity for heart rhythm control.  - Ms. Otero to adjust physical therapy intensity to prevent excessive fatigue.  - Recommend using a manual wheelchair for longer distances to conserve energy.  - Ms. Otero to monitor blood pressure at home, focusing on average readings rather than isolated elevated readings.  - Started pantoprazole (Protonix) daily for acid suppression.  - Started Carafate (sucralfate) liquid formulation for ulcer treatment.  - Continued Eliquis (apixaban) 2.5 mg twice daily for anticoagulation.  - Continued amiodarone for atrial fibrillation management.  - Discontinued famotidine (Pepcid).  - Fasting labs ordered to be done before next follow-up visit.  - Follow up in 4 months.  - Complete fasting labs prior to next appointment.         Time spent with patient: 20  minutes  Patient with be reevaluated in 4 months or sooner prn  Greater than 50% of this visit was spent counseling as described in above documentation:Yes  This note was generated with the assistance of ambient listening technology. Verbal consent was obtained by the patient and accompanying visitor(s) for the recording of patient appointment to facilitate this note. I attest to having reviewed and edited the generated note for accuracy, though some syntax or spelling errors may persist. Please contact the author of this note for any clarification.

## 2024-10-25 ENCOUNTER — EXTERNAL HOME HEALTH (OUTPATIENT)
Dept: HOME HEALTH SERVICES | Facility: HOSPITAL | Age: 84
End: 2024-10-25
Payer: MEDICARE

## 2024-10-25 ENCOUNTER — OFFICE VISIT (OUTPATIENT)
Dept: CARDIOLOGY | Facility: CLINIC | Age: 84
End: 2024-10-25
Payer: MEDICARE

## 2024-10-25 VITALS
WEIGHT: 115.31 LBS | BODY MASS INDEX: 19.68 KG/M2 | DIASTOLIC BLOOD PRESSURE: 60 MMHG | OXYGEN SATURATION: 95 % | RESPIRATION RATE: 18 BRPM | SYSTOLIC BLOOD PRESSURE: 124 MMHG | TEMPERATURE: 99 F | HEART RATE: 95 BPM | HEIGHT: 64 IN

## 2024-10-25 VITALS
DIASTOLIC BLOOD PRESSURE: 68 MMHG | HEART RATE: 82 BPM | HEIGHT: 64 IN | WEIGHT: 113.75 LBS | BODY MASS INDEX: 19.42 KG/M2 | OXYGEN SATURATION: 91 % | SYSTOLIC BLOOD PRESSURE: 132 MMHG

## 2024-10-25 DIAGNOSIS — Z95.820 S/P ANGIOPLASTY WITH STENT: ICD-10-CM

## 2024-10-25 DIAGNOSIS — I25.10 CORONARY ARTERY DISEASE, UNSPECIFIED VESSEL OR LESION TYPE, UNSPECIFIED WHETHER ANGINA PRESENT, UNSPECIFIED WHETHER NATIVE OR TRANSPLANTED HEART: ICD-10-CM

## 2024-10-25 DIAGNOSIS — I46.9 CARDIOPULMONARY ARREST: ICD-10-CM

## 2024-10-25 DIAGNOSIS — I70.0 ATHEROSCLEROSIS OF AORTA: ICD-10-CM

## 2024-10-25 DIAGNOSIS — I10 HYPERTENSION, UNSPECIFIED TYPE: ICD-10-CM

## 2024-10-25 DIAGNOSIS — I50.23 ACUTE ON CHRONIC HFREF (HEART FAILURE WITH REDUCED EJECTION FRACTION): ICD-10-CM

## 2024-10-25 DIAGNOSIS — I48.0 PAROXYSMAL ATRIAL FIBRILLATION: Primary | ICD-10-CM

## 2024-10-25 DIAGNOSIS — J44.9 CHRONIC OBSTRUCTIVE PULMONARY DISEASE, UNSPECIFIED COPD TYPE: ICD-10-CM

## 2024-10-25 DIAGNOSIS — E78.5 HYPERLIPIDEMIA, UNSPECIFIED HYPERLIPIDEMIA TYPE: ICD-10-CM

## 2024-10-25 PROCEDURE — 99999 PR PBB SHADOW E&M-EST. PATIENT-LVL IV: CPT | Mod: PBBFAC,HCNC,,

## 2024-10-25 RX ORDER — SPIRONOLACTONE 25 MG/1
12.5 TABLET ORAL DAILY
Qty: 45 TABLET | Refills: 3 | Status: SHIPPED | OUTPATIENT
Start: 2024-10-25 | End: 2025-10-25

## 2024-10-25 RX ORDER — FUROSEMIDE 20 MG/1
20 TABLET ORAL DAILY PRN
Qty: 30 TABLET | Refills: 1 | Status: SHIPPED | OUTPATIENT
Start: 2024-10-25 | End: 2025-10-25

## 2024-10-25 NOTE — ASSESSMENT & PLAN NOTE
Recommend statin therapy.  Low sodium heart healthy diet.  Reduce saturated fats.    Lipid panel when fasting.   Needs statin therapy Last . LDL <70 recommended.

## 2024-10-25 NOTE — ASSESSMENT & PLAN NOTE
HFrEF 40-45% with moderate MV stenosis.  1.5 L fluid restriction. 2g salt restriction. Daily weights.   Lasix PRN for >3lbs weight gain, increased shortness of breath, and lower extremity edema.  Start spironolactone 12.5 mg daily and metoprolol succinate 25mg daily.  Start Jardiance 10 mg daily.

## 2024-10-25 NOTE — ASSESSMENT & PLAN NOTE
New onset during recent hospitalization.  Stable. SR today in office. Continue amiodarone.  Reduce to once daily 11/11/24.  Continue Toprol.  K 3.6 on recent labs.  Start spironolactone 12.5 mg daily for GDMT and hypokalemia.  Continue magnesium oxide 400 mg daily

## 2024-10-25 NOTE — ASSESSMENT & PLAN NOTE
Lipid panel when fasting.  Low sodium heart healthy diet.  No acute ST-T wave changes on EKG.  Continue Eliquis.  Likely needs statin therapy.

## 2024-10-25 NOTE — ASSESSMENT & PLAN NOTE
Stable.  Low sodium heart healthy diet.  /68.  Continue current medication regimen. Will add spironolactone 12.5 mg daily for optimization of GDMT.

## 2024-10-25 NOTE — PROGRESS NOTES
Subjective:    Patient ID:  Alexa Otero is a 84 y.o. female patient here for evaluation Hospital Follow Up and Atrial Fibrillation      History of Present Illness:     Patient is here for a hospital follow up. Recent hospitalization for acute AFRVR (new onset), CHF exacerbation.  Patient was given amiodarone and weaned from diltizem gtt.  She underwent VASILIY/CV 10/10 with return to SR.  She remains on amio 200 mg BID and Toprol 25 mg daily.  She is on Eliquis 2.5 mg BID.  No longer on DAPT.  Acute on chronic anemia during hospitalization, improving outpatient.  2L NC at all times.  Will occasionally increase to 3 L when she uses her portable.   Denies chest pain, shortness of breath, lightheadedness, dizziness, jaw/neck/arm pain, palpitations, orthopnea, PND, edema, or bleeding.     Shortness of breath is stable.  Hx lung cancer. Has appt with Oncology soon.  Euvolemic in office today. NSR on EKG.   Echo during recent admission showed EF 40-45% with moderate MV stenosis.  Saw PCP and was started on PPI and Carafate.   S/p CAD PCI to LAD, LCX, and RCA by Dr. Julio Oc 2023.  Denies any CP.    Plans for follow up with GI next week for possible scope for further evaluation of abd pain per patient.       Review of patient's allergies indicates:  No Known Allergies    Past Medical History:   Diagnosis Date    Arthritis     Cardiac arrest 10/2023    Cataract     Chronic obstructive pulmonary disease, unspecified COPD type 3/20/2024    Colon polyp     Coronary artery disease 3/20/2024    Diabetes mellitus type II 2012    Encounter for blood transfusion     Extra-ovarian endometrioid adenocarcinoma 2020    GERD (gastroesophageal reflux disease)     History of chronic cough     clear productive    Hyperlipidemia     Hypertension     Macular degeneration     Ovarian cancer     PONV (postoperative nausea and vomiting)     Squamous cell carcinoma 1980's    precancer of cervix     Past Surgical History:   Procedure Laterality  Date    AUGMENTATION OF BREAST      BRONCHOSCOPY N/A 12/12/2023    Procedure: BRONCHOSCOPY;  Surgeon: Neva Christian MD;  Location: [facility] ENDO;  Service: ENT;  Laterality: N/A;    BRONCHOSCOPY WITH FLUOROSCOPY Right 05/11/2023    Procedure: BRONCHOSCOPY, WITH FLUOROSCOPY;  Surgeon: Neva Christian MD;  Location: [facility] ENDO;  Service: Pulmonary;  Laterality: Right;    BRONCHOSCOPY WITH FLUOROSCOPY N/A 12/15/2023    Procedure: BRONCHOSCOPY, WITH FLUOROSCOPY;  Surgeon: Neva Christian MD;  Location: [facility] ENDO;  Service: Pulmonary;  Laterality: N/A;    CATARACT EXTRACTION BILATERAL W/ ANTERIOR VITRECTOMY Bilateral     CHOLECYSTECTOMY      COLONOSCOPY      COLONOSCOPY N/A 04/20/2023    Procedure: COLONOSCOPY;  Surgeon: Sabino Stephenson MD;  Location: [facility];  Service: Endoscopy;  Laterality: N/A;    CORONARY STENT PLACEMENT  10/2023    x 3    ESOPHAGOGASTRODUODENOSCOPY N/A 06/20/2018    Procedure: EGD (ESOPHAGOGASTRODUODENOSCOPY);  Surgeon: Sabino Stephenson MD;  Location: [facility];  Service: Endoscopy;  Laterality: N/A;    ESOPHAGOGASTRODUODENOSCOPY N/A 03/31/2023    Procedure: EGD (ESOPHAGOGASTRODUODENOSCOPY);  Surgeon: Sabino Stephenson MD;  Location: [facility];  Service: Endoscopy;  Laterality: N/A;    ESOPHAGOGASTRODUODENOSCOPY N/A 12/11/2023    Procedure: EGD (ESOPHAGOGASTRODUODENOSCOPY);  Surgeon: Pretty Salgado MD;  Location: [facility];  Service: Endoscopy;  Laterality: N/A;    HYSTERECTOMY  1976    partial    INJECTION OF ANESTHETIC AGENT AROUND MEDIAL BRANCH NERVES INNERVATING LUMBAR FACET JOINT Right 05/10/2023    Procedure: Block-nerve-Lateral-branch-lumbar;  Surgeon: Agustin Robles MD;  [facility];  Service: Pain Management;  Laterality: Right;  L5 and s1,s2 LBB    INJECTION OF ANESTHETIC AGENT AROUND MEDIAL BRANCH NERVES INNERVATING LUMBAR FACET JOINT Right 05/30/2023    Procedure: Block-nerve-medial branch-lumbar;  Surgeon: Agustin Robles MD;  [facility];  Service: Pain Management;   Laterality: Right;  L5, s1 ,s2 LBB #2    INJECTION OF ANESTHETIC AGENT AROUND NERVE Right 10/31/2023    Procedure: INTERCOSTAL NERVE BLOCK;  Surgeon: Washington Shankar MD;  Location: [facility] OR;  Service: Cardiothoracic;  Laterality: Right;    INJECTION, SACROILIAC JOINT Right 01/26/2023    Procedure: INJECTION,SACROILIAC JOINT;  Surgeon: Agustin Robles MD;  Location: [facility] OR;  Service: Pain Management;  Laterality: Right;    INSERTION OF TUNNELED CENTRAL VENOUS CATHETER (CVC) WITH SUBCUTANEOUS PORT Right 10/19/2020    Procedure: INSERTION, PORT-A-CATH;  Surgeon: Misti Mcfarland MD;  Location: [facility] OR;  Service: General;  Laterality: Right;    INTRAMEDULLARY RODDING OF TROCHANTER OF FEMUR Right 11/28/2021    Procedure: INSERTION, INTRAMEDULLARY LUC, FEMUR, TROCHANTER/RIGHT TFN DR FAJARDO NOTIFIED REP;  Surgeon: Kp Fajardo MD;  Location: [facility];  Service: Orthopedics;  Laterality: Right;  [employer]    ROBOT-ASSISTED LAPAROSCOPIC LYMPHADENECTOMY USING DA NELLA XI N/A 09/21/2020    Procedure: XI ROBOTIC LYMPHADENECTOMY-pelvic and kell-aortic;  Surgeon: Altagracia Ray MD;  Location: [facility] OR;  Service: OB/GYN;  Laterality: N/A;    ROBOT-ASSISTED LAPAROSCOPIC OMENTECTOMY USING DA NELLA XI N/A 09/21/2020    Procedure: XI ROBOTIC OMENTECTOMY;  Surgeon: Altagracia Ray MD;  Location: [facility] OR;  Service: OB/GYN;  Laterality: N/A;    ROBOT-ASSISTED LAPAROSCOPIC SALPINGO-OOPHORECTOMY USING DA NELLA XI Bilateral 09/21/2020    Procedure: XI ROBOTIC SALPINGO-OOPHORECTOMY;  Surgeon: Altagracia Ray MD;  Location: [facility] OR;  Service: OB/GYN;  Laterality: Bilateral;    THORACOSCOPIC BIOPSY OF PLEURA Right 10/31/2023    Procedure: VATS, WITH PLEURA BIOPSY;  Surgeon: Washington Shankar MD;  Location: [facility];  Service: Cardiothoracic;  Laterality: Right;  FROZEN SECTION   **KRISTEN-ASSISDT**    THORACOTOMY Right 10/31/2023    Procedure: THORACOTOMY;  Surgeon: Washington Shankar MD;  Location: [facility];  Service: Cardiothoracic;   Laterality: Right;    UPPER GASTROINTESTINAL ENDOSCOPY       Social History     Tobacco Use    Smoking status: Former     Current packs/day: 0.00     Average packs/day: 1 pack/day for 20.0 years (20.0 ttl pk-yrs)     Types: Cigarettes     Start date:      Quit date:      Years since quittin.8    Smokeless tobacco: Never    Tobacco comments:     quit 40 yrs ago   Substance Use Topics    Alcohol use: Yes     Alcohol/week: 1.0 standard drink of alcohol     Types: 1 Glasses of wine per week     Comment: occasional    Drug use: No        Review of Systems:    As noted in HPI in addition      REVIEW OF SYSTEMS  CARDIOVASCULAR: No recent chest pain, palpitations, arm, neck, or jaw pain  RESPIRATORY: No recent fever, cough chills, SOB or congestion  : No blood in the urine  GI: No Nausea, vomiting, constipation, diarrhea, blood, or reflux.  MUSCULOSKELETAL: No myalgias  NEURO: No lightheadedness or dizziness  EYES: No Double vision, blurry, vision or headache              Objective        Vitals:    10/25/24 1043   BP: 132/68   Pulse: 82       LIPIDS - LAST 2   Lab Results   Component Value Date    CHOL 220 (H) 2024    CHOL 200 (H) 2023    HDL 55 2024    HDL 44 2023    LDLCALC 148.0 2024    LDLCALC 137.2 2023    TRIG 85 2024    TRIG 94 2023    CHOLHDL 25.0 2024    CHOLHDL 22.0 2023       CBC - LAST 2  Lab Results   Component Value Date    WBC 11.40 10/23/2024    WBC 9.27 10/18/2024    RBC 2.76 (L) 10/23/2024    RBC 2.57 (L) 10/18/2024    HGB 8.7 (L) 10/23/2024    HGB 8.2 (L) 10/18/2024    HCT 28.8 (L) 10/23/2024    HCT 27.2 (L) 10/18/2024     (H) 10/23/2024     (H) 10/18/2024    MCH 31.5 (H) 10/23/2024    MCH 31.9 (H) 10/18/2024    MCHC 30.2 (L) 10/23/2024    MCHC 30.1 (L) 10/18/2024    RDW 19.0 (H) 10/23/2024    RDW 18.4 (H) 10/18/2024     10/23/2024     10/18/2024    MPV 9.5 10/23/2024    MPV 9.8 10/18/2024    GRAN 9.3  (H) 10/23/2024    GRAN 81.8 (H) 10/23/2024    LYMPH 0.4 (L) 10/23/2024    LYMPH 3.6 (L) 10/23/2024    MONO 1.5 (H) 10/23/2024    MONO 13.2 10/23/2024    BASO 0.03 10/23/2024    BASO 0.01 10/18/2024    NRBC 0 10/23/2024    NRBC 0 10/18/2024       CHEMISTRY & LIVER FUNCTION - LAST 2  Lab Results   Component Value Date     10/23/2024     (L) 10/11/2024    K 3.6 10/23/2024    K 4.1 10/11/2024     10/23/2024    CL 98 10/11/2024    CO2 27 10/23/2024    CO2 31 (H) 10/11/2024    ANIONGAP 13 10/23/2024    ANIONGAP 6 (L) 10/11/2024    BUN 17 10/23/2024    BUN 23 10/11/2024    CREATININE 1.1 10/23/2024    CREATININE 1.2 10/11/2024     (H) 10/23/2024     (H) 10/11/2024    CALCIUM 9.0 10/23/2024    CALCIUM 8.0 (L) 10/11/2024    PH 7.335 (L) 10/31/2023    PH 7.412 10/31/2023    MG 1.8 10/23/2024    MG 1.8 10/11/2024    ALBUMIN 3.2 (L) 10/23/2024    ALBUMIN 2.6 (L) 10/11/2024    PROT 6.8 10/23/2024    PROT 5.8 (L) 10/11/2024    ALKPHOS 51 (L) 10/23/2024    ALKPHOS 47 (L) 10/11/2024    ALT 11 10/23/2024    ALT 16 10/11/2024    AST 18 10/23/2024    AST 14 10/11/2024    BILITOT 0.3 10/23/2024    BILITOT 0.4 10/11/2024        CARDIAC PROFILE - LAST 2  Lab Results   Component Value Date     (H) 10/07/2024     (H) 12/10/2023    CPK 35 03/18/2023     12/05/2023    TROPONINI 0.149 (HH) 10/13/2023    TROPONINI 0.033 10/13/2023    TROPONINIHS 13.3 10/07/2024    TROPONINIHS 9.7 12/10/2023        COAGULATION - LAST 2  Lab Results   Component Value Date    LABPT 14.4 10/15/2023    LABPT 14.0 10/14/2023    INR 1.1 10/07/2024    INR 1.3 (H) 12/03/2023    APTT 27.2 10/27/2023    APTT 180.9 (HH) 10/13/2023       ENDOCRINE & PSA - LAST 2  Lab Results   Component Value Date    HGBA1C 5.5 06/26/2024    HGBA1C 5.3 11/28/2023    TSH 0.876 10/07/2024    TSH 0.772 12/03/2023    PROCAL 0.362 12/10/2023    PROCAL 1.42 (H) 03/18/2023        ECHOCARDIOGRAM RESULTS  Results for orders placed during the  hospital encounter of 10/07/24    Transesophageal echo (VASILIY) with possible cardioversion    Interpretation Summary    Left Ventricle: The left ventricle is normal in size. There is normal systolic function with a visually estimated ejection fraction of 55 - 60%.    Right Ventricle: Normal right ventricular cavity size. Systolic function is normal.    Left Atrium: Left atrium is severely dilated. There is no thrombus in the left atrial appendage.    Right Atrium: Right atrium is moderately dilated.    Mitral Valve: There is mild to moderate stenosis. The mean pressure gradient across the mitral valve is 6 mmHg at a heart rate of  bpm. There is mild to moderate regurgitation.    Tricuspid Valve: There is mild to moderate regurgitation.    Successful cardioversion with 1 shock      CURRENT/PREVIOUS VISIT EKG  Results for orders placed or performed during the hospital encounter of 10/07/24   EKG 12-LEAD    Collection Time: 10/10/24  1:30 PM   Result Value Ref Range    QRS Duration 66 ms    OHS QTC Calculation 462 ms    Narrative    Test Reason : I48.91,    Vent. Rate : 083 BPM     Atrial Rate : 083 BPM     P-R Int : 152 ms          QRS Dur : 066 ms      QT Int : 394 ms       P-R-T Axes : 020 020 060 degrees     QTc Int : 462 ms    Normal sinus rhythm  Septal infarct (cited on or before 13-OCT-2023)  Abnormal ECG  When compared with ECG of 07-OCT-2024 20:41,  Sinus rhythm has replaced Atrial fibrillation  Vent. rate has decreased BY  54 BPM  Questionable change in initial forces of Anterior leads    Referred By: [email or web address]   SELF           Confirmed By:      No valid procedures specified.   No results found for this or any previous visit.    No valid procedures specified.    PHYSICAL EXAM  CONSTITUTIONAL: Well built, well nourished in no apparent distress  NECK: no carotid bruit, no JVD  LUNGS: CTA  CHEST WALL: no tenderness  HEART: regular rate and rhythm, S1, S2 normal, no murmur, click, rub or gallop   ABDOMEN: soft,  non-tender; bowel sounds normal; no masses,  no organomegaly  EXTREMITIES: Extremities normal, no edema, no calf tenderness noted  NEURO: AAO X 3    I HAVE REVIEWED :    The vital signs, nurses notes, and all the pertinent radiology and labs.        Current Outpatient Medications   Medication Instructions    amiodarone (PACERONE) 200 mg, Oral, 2 times daily    ELIQUIS 2.5 mg, Oral, 2 times daily    empagliflozin (JARDIANCE) 10 mg, Oral, Daily    folic acid (FOLVITE) 1 mg, Oral, Daily    furosemide (LASIX) 20 mg, Oral, Daily PRN    gabapentin (NEURONTIN) 100 mg, Oral, 2 times daily    HYDROcodone-acetaminophen (NORCO) 5-325 mg per tablet 1 tablet, Every 6 hours PRN    LORazepam (ATIVAN) 1 mg, Oral, Every 6 hours PRN    magnesium oxide (MAG-OX) 400 mg, Oral, Daily, Patient requested refill    metoprolol succinate (TOPROL-XL) 25 mg, Oral, Daily    nitrofurantoin, macrocrystal-monohydrate, (MACROBID) 100 MG capsule 100 mg, Oral, 2 times daily    pantoprazole (PROTONIX) 40 mg, Oral, Daily    spironolactone (ALDACTONE) 12.5 mg, Oral, Daily    sucralfate (CARAFATE) 1 g, Oral, 4 times daily    VIT A/VIT C/VIT E/ZINC/COPPER (ICAPS AREDS ORAL) 1 capsule, 2 times daily          Assessment & Plan     Chronic obstructive pulmonary disease, unspecified COPD type  Stable. On portable O2.  Managed by pulmonology.    Atherosclerosis of aorta  Risk factor modification. Low sodium heart healthy diet.  Not currently on statin therapy.  Need lipid panel when fasting. LDL <70.      Atrial fibrillation  New onset during recent hospitalization.  Stable. SR today in office. Continue amiodarone.  Reduce to once daily 11/11/24.  Continue Toprol.  K 3.6 on recent labs.  Start spironolactone 12.5 mg daily for GDMT and hypokalemia.  Continue magnesium oxide 400 mg daily    Cardiopulmonary arrest  Stable.  Hx VF arrest during outpatient PCI.  Continue amiodarone and Toprol.    Continue magnesium oxide 400 mg daily.     Coronary artery  disease  CAD s/p PCI October 2023.  Denies anginal equivalent symptoms.  On Eliquis 2.5 mg BID.  Recent hospitalization was taken off DAPT.  Check lipid panel when fasting. Not on statin therapy.  Needs statin.  Low sodium heart healthy diet.     HTN (hypertension)  Stable.  Low sodium heart healthy diet.  /68.  Continue current medication regimen. Will add spironolactone 12.5 mg daily for optimization of GDMT.    Hyperlipidemia  Recommend statin therapy.  Low sodium heart healthy diet.  Reduce saturated fats.    Lipid panel when fasting.   Needs statin therapy Last . LDL <70 recommended.      S/P angioplasty with stent  Lipid panel when fasting.  Low sodium heart healthy diet.  No acute ST-T wave changes on EKG.  Continue Eliquis.  Likely needs statin therapy.    Acute on chronic HFrEF (heart failure with reduced ejection fraction)  HFrEF 40-45% with moderate MV stenosis.  1.5 L fluid restriction. 2g salt restriction. Daily weights.   Lasix PRN for >3lbs weight gain, increased shortness of breath, and lower extremity edema.  Start spironolactone 12.5 mg daily and metoprolol succinate 25mg daily.  Start Jardiance 10 mg daily.           No follow-ups on file.

## 2024-10-25 NOTE — ASSESSMENT & PLAN NOTE
CAD s/p PCI October 2023.  Denies anginal equivalent symptoms.  On Eliquis 2.5 mg BID.  Recent hospitalization was taken off DAPT.  Check lipid panel when fasting. Not on statin therapy.  Needs statin.  Low sodium heart healthy diet.

## 2024-10-25 NOTE — ASSESSMENT & PLAN NOTE
Stable.  Hx VF arrest during outpatient PCI.  Continue amiodarone and Toprol.    Continue magnesium oxide 400 mg daily.

## 2024-10-25 NOTE — ASSESSMENT & PLAN NOTE
Risk factor modification. Low sodium heart healthy diet.  Not currently on statin therapy.  Need lipid panel when fasting. LDL <70.

## 2024-10-28 ENCOUNTER — INFUSION (OUTPATIENT)
Dept: INFUSION THERAPY | Facility: HOSPITAL | Age: 84
End: 2024-10-28
Attending: INTERNAL MEDICINE
Payer: MEDICARE

## 2024-10-28 VITALS
TEMPERATURE: 98 F | BODY MASS INDEX: 19.42 KG/M2 | HEART RATE: 80 BPM | HEIGHT: 64 IN | OXYGEN SATURATION: 94 % | DIASTOLIC BLOOD PRESSURE: 62 MMHG | RESPIRATION RATE: 18 BRPM | WEIGHT: 113.75 LBS | SYSTOLIC BLOOD PRESSURE: 149 MMHG

## 2024-10-28 DIAGNOSIS — C34.31 PRIMARY MALIGNANT NEOPLASM OF BRONCHUS OF RIGHT LOWER LOBE: Primary | ICD-10-CM

## 2024-10-28 PROCEDURE — A4216 STERILE WATER/SALINE, 10 ML: HCPCS | Performed by: INTERNAL MEDICINE

## 2024-10-28 PROCEDURE — 96372 THER/PROPH/DIAG INJ SC/IM: CPT | Mod: 59

## 2024-10-28 PROCEDURE — 96367 TX/PROPH/DG ADDL SEQ IV INF: CPT

## 2024-10-28 PROCEDURE — 25000003 PHARM REV CODE 250: Performed by: INTERNAL MEDICINE

## 2024-10-28 PROCEDURE — 96409 CHEMO IV PUSH SNGL DRUG: CPT

## 2024-10-28 PROCEDURE — 63600175 PHARM REV CODE 636 W HCPCS: Performed by: INTERNAL MEDICINE

## 2024-10-28 PROCEDURE — 96376 TX/PRO/DX INJ SAME DRUG ADON: CPT

## 2024-10-28 RX ORDER — SODIUM CHLORIDE 0.9 % (FLUSH) 0.9 %
10 SYRINGE (ML) INJECTION
Status: DISCONTINUED | OUTPATIENT
Start: 2024-10-28 | End: 2024-10-28 | Stop reason: HOSPADM

## 2024-10-28 RX ORDER — CYANOCOBALAMIN 1000 UG/ML
1000 INJECTION, SOLUTION INTRAMUSCULAR; SUBCUTANEOUS
Status: COMPLETED | OUTPATIENT
Start: 2024-10-28 | End: 2024-10-28

## 2024-10-28 RX ORDER — HEPARIN 100 UNIT/ML
500 SYRINGE INTRAVENOUS
Status: DISCONTINUED | OUTPATIENT
Start: 2024-10-28 | End: 2024-10-28 | Stop reason: HOSPADM

## 2024-10-28 RX ADMIN — APREPITANT 130 MG: 130 INJECTION, EMULSION INTRAVENOUS at 07:10

## 2024-10-28 RX ADMIN — DEXAMETHASONE SODIUM PHOSPHATE 0.25 MG: 4 INJECTION, SOLUTION INTRA-ARTICULAR; INTRALESIONAL; INTRAMUSCULAR; INTRAVENOUS; SOFT TISSUE at 07:10

## 2024-10-28 RX ADMIN — HEPARIN 500 UNITS: 100 SYRINGE at 08:10

## 2024-10-28 RX ADMIN — CYANOCOBALAMIN 1000 MCG: 1000 INJECTION, SOLUTION INTRAMUSCULAR at 07:10

## 2024-10-28 RX ADMIN — PEMETREXED DISODIUM 575 MG: 500 INJECTION, POWDER, LYOPHILIZED, FOR SOLUTION INTRAVENOUS at 08:10

## 2024-10-28 RX ADMIN — SODIUM CHLORIDE, PRESERVATIVE FREE 10 ML: 5 INJECTION INTRAVENOUS at 08:10

## 2024-10-31 ENCOUNTER — TELEPHONE (OUTPATIENT)
Dept: GASTROENTEROLOGY | Facility: CLINIC | Age: 84
End: 2024-10-31

## 2024-10-31 ENCOUNTER — OFFICE VISIT (OUTPATIENT)
Dept: GASTROENTEROLOGY | Facility: CLINIC | Age: 84
End: 2024-10-31
Payer: MEDICARE

## 2024-10-31 VITALS
HEART RATE: 75 BPM | WEIGHT: 114.88 LBS | DIASTOLIC BLOOD PRESSURE: 70 MMHG | BODY MASS INDEX: 19.72 KG/M2 | SYSTOLIC BLOOD PRESSURE: 162 MMHG

## 2024-10-31 DIAGNOSIS — K59.00 CONSTIPATION, UNSPECIFIED CONSTIPATION TYPE: ICD-10-CM

## 2024-10-31 DIAGNOSIS — R68.81 EARLY SATIETY: ICD-10-CM

## 2024-10-31 DIAGNOSIS — R93.5 ABNORMAL CT OF THE ABDOMEN: Primary | ICD-10-CM

## 2024-10-31 DIAGNOSIS — Z85.118 HISTORY OF LUNG CANCER: ICD-10-CM

## 2024-10-31 DIAGNOSIS — K21.9 GASTROESOPHAGEAL REFLUX DISEASE, UNSPECIFIED WHETHER ESOPHAGITIS PRESENT: ICD-10-CM

## 2024-10-31 DIAGNOSIS — R10.10 UPPER ABDOMINAL PAIN: ICD-10-CM

## 2024-10-31 DIAGNOSIS — K22.70 BARRETT'S ESOPHAGUS WITHOUT DYSPLASIA: ICD-10-CM

## 2024-10-31 DIAGNOSIS — Z86.0100 HISTORY OF COLON POLYPS: ICD-10-CM

## 2024-10-31 DIAGNOSIS — R14.2 BELCHING: ICD-10-CM

## 2024-10-31 PROCEDURE — 3078F DIAST BP <80 MM HG: CPT | Mod: HCNC,CPTII,S$GLB,

## 2024-10-31 PROCEDURE — 1157F ADVNC CARE PLAN IN RCRD: CPT | Mod: HCNC,CPTII,S$GLB,

## 2024-10-31 PROCEDURE — 1126F AMNT PAIN NOTED NONE PRSNT: CPT | Mod: HCNC,CPTII,S$GLB,

## 2024-10-31 PROCEDURE — 1111F DSCHRG MED/CURRENT MED MERGE: CPT | Mod: HCNC,CPTII,S$GLB,

## 2024-10-31 PROCEDURE — 99212 OFFICE O/P EST SF 10 MIN: CPT | Mod: HCNC,S$GLB,,

## 2024-10-31 PROCEDURE — 3077F SYST BP >= 140 MM HG: CPT | Mod: HCNC,CPTII,S$GLB,

## 2024-10-31 PROCEDURE — 99999 PR PBB SHADOW E&M-EST. PATIENT-LVL IV: CPT | Mod: PBBFAC,HCNC,,

## 2024-10-31 PROCEDURE — 1159F MED LIST DOCD IN RCRD: CPT | Mod: HCNC,CPTII,S$GLB,

## 2024-11-01 PROBLEM — J96.21 ACUTE ON CHRONIC HYPOXIC RESPIRATORY FAILURE: Status: ACTIVE | Noted: 2024-11-01

## 2024-11-01 PROBLEM — A41.9 SEVERE SEPSIS: Status: ACTIVE | Noted: 2024-11-01

## 2024-11-01 PROBLEM — R65.20 SEVERE SEPSIS: Status: ACTIVE | Noted: 2024-11-01

## 2024-11-01 PROBLEM — J18.9 MULTIFOCAL PNEUMONIA: Status: ACTIVE | Noted: 2024-11-01

## 2024-11-01 NOTE — PLAN OF CARE
Problem: Adult Inpatient Plan of Care  Goal: Plan of Care Review  Outcome: Progressing  Goal: Patient-Specific Goal (Individualized)  Outcome: Progressing  Goal: Absence of Hospital-Acquired Illness or Injury  Outcome: Progressing  Goal: Optimal Comfort and Wellbeing  Outcome: Progressing  Goal: Readiness for Transition of Care  Outcome: Progressing     Problem: Sepsis/Septic Shock  Goal: Optimal Coping  Outcome: Progressing  Goal: Absence of Bleeding  Outcome: Progressing  Goal: Blood Glucose Level Within Targeted Range  Outcome: Progressing  Goal: Absence of Infection Signs and Symptoms  Outcome: Progressing  Goal: Optimal Nutrition Intake  Outcome: Progressing     Problem: Pneumonia  Goal: Fluid Balance  Outcome: Progressing  Goal: Resolution of Infection Signs and Symptoms  Outcome: Progressing  Goal: Effective Oxygenation and Ventilation  Outcome: Progressing

## 2024-11-01 NOTE — ED NOTES
Asked patient if she needed urinate due to staff needing a urinalysis. Patient placed on purewick. Soiled underwear changed and brief put on.

## 2024-11-01 NOTE — PLAN OF CARE
Critical access hospital  Initial Discharge Assessment       Primary Care Provider: Idalia Greco MD    Admission Diagnosis: Pneumonia due to infectious organism, unspecified laterality, unspecified part of lung [J18.9]    Admission Date: 11/1/2024  Expected Discharge Date:     Transition of Care Barriers: None    Spoke to pt at bedside to complete assessment. Pt lives at listed address with her spouse. Spouse drives. PCP Angus. Pharmacy [facility]. DME rw and o2 through [facility]. Pt is active with [facility]. Denies hd/coumadin. Pt with active chemo therapy plan. CM to follow for dc needs    Payor: [employer] MEDICARE / Plan: [employer] MEDICARE HMO / Product Type: Capitation /     Extended Emergency Contact Information  Primary Emergency Contact: TRISTON LE  Address: [address] [place] [address] [place]  Mobile Phone: [phone]  Relation: Daughter  Secondary Emergency Contact: Porter Otero  Address: [address], [place] [address] [place] [address]  Home Phone: [phone]  Mobile Phone: [phone]  Relation: Spouse  Preferred language: English   needed? No    Discharge Plan A: Home Health  Discharge Plan B: Home with family      [facility]  [address]  [facility] [address]  Phone: [phone] Fax: [phone]    [facility] - [facility] [address]  [address] [facility] [address]  Phone: [phone] Fax: [phone]      Initial Assessment (most recent)       Adult Discharge Assessment - 11/01/24 1609          Discharge Assessment    Assessment Type Discharge Planning Assessment     Confirmed/corrected address, phone number and insurance Yes     Confirmed Demographics Correct on Facesheet     Source of Information patient     People in Home spouse     Do you expect to return to your current living situation? Yes     Do you have help at home or someone to help  you manage your care at home? Yes     Prior to hospitilization cognitive status: Unable to Assess     Current cognitive status: Alert/Oriented     Walking or Climbing Stairs Difficulty yes     Walking or Climbing Stairs ambulation difficulty, requires equipment     Dressing/Bathing Difficulty no     Equipment Currently Used at Home walker, rolling;oxygen     Readmission within 30 days? No     Patient currently being followed by outpatient case management? No     Do you currently have service(s) that help you manage your care at home? Yes     Name and Contact number of agency [facility]     Is the pt/caregiver preference to resume services with current agency Yes     Do you take prescription medications? Yes     Do you have prescription coverage? Yes     Coverage [employer]     Do you have any problems affording any of your prescribed medications? No     Is the patient taking medications as prescribed? yes     How do you get to doctors appointments? family or friend will provide     Are you on dialysis? No     Do you take coumadin? No     Discharge Plan A Home Health     Discharge Plan B Home with family     DME Needed Upon Discharge  none     Discharge Plan discussed with: Patient     Transition of Care Barriers None

## 2024-11-01 NOTE — H&P
[facility] - Emergency Dept  Hospital Medicine  History & Physical    Patient Name: Alexa Otero  MRN: [id number]  Patient Class: IP- Inpatient  Admission Date: 11/1/2024  Attending Physician: Jaimie Marcum MD  Primary Care Provider: Idalia Greco MD         Patient information was obtained from patient, spouse/SO, and ER records.     Subjective:     Principal Problem:Severe sepsis    Chief Complaint:   Chief Complaint   Patient presents with    Generalized Weakness     Per EMS, patient slipped out of bed onto floor hitting bottom. Did not hit head. Realized she was weaker than normal. Hx lung cancer. Sating 60s  on 4L in triage.        HPI: 84-year-old female with a past medical history of lung cancer actively getting chemo last of which was 5 days ago, who presented to the ER because of generalized weakness.  According to the patient, she woke up today, tried to get out of bed, but felt very weak, and slid beside the bed.  Did not hit her head, and did not lose consciousness.  She however could not get up however, and her  could not assist.  911 was called.  On arrival of EMS, patient was satting 88% on 4 L, and appeared to be short of breath.  She was brought to the ER for evaluation.    In the ER, vitals showed a blood pressure of 145/65, heart rate of 103, respiratory rate of 20, afebrile satting 60% on 4 L.  Immediately patient was placed on non-rebreather.  CBC with white count of 16.8, and macrocytic anemia.  CMP showed hypokalemia of 3.4.  First troponin is elevated at 46.  Point of care Lactic acid was 2.1.  Blood cultures were collected.  EKG showed sinus rhythm.  Chest x-ray showed no significant change of bilateral diffuse mixed interstitial and airspace lung opacities.  CTA chest was done, and it was negative for PE, but showed extensive interstitial and airspace opacities throughout both lungs, having significantly worsened in the left lung compared to prior CT. Unchanged  small to moderate sized bilateral pleural effusions.  Cefepime was ordered, and patient will be admitted to Medicine for further care of her severe sepsis.    Past Medical History:   Diagnosis Date    Arthritis     Cardiac arrest 10/2023    Cataract     Chronic obstructive pulmonary disease, unspecified COPD type 03/20/2024    Colon polyp     Coronary artery disease 03/20/2024    Diabetes mellitus type II 2012    Encounter for blood transfusion     Extra-ovarian endometrioid adenocarcinoma 2020    GERD (gastroesophageal reflux disease)     History of chronic cough     clear productive    Hyperlipidemia     Hypertension     Macular degeneration     Ovarian cancer     PONV (postoperative nausea and vomiting)     Squamous cell carcinoma 1980's    precancer of cervix       Past Surgical History:   Procedure Laterality Date    AUGMENTATION OF BREAST      BRONCHOSCOPY N/A 12/12/2023    Procedure: BRONCHOSCOPY;  Surgeon: Neva Chirstian MD;  Location: [facility];  Service: ENT;  Laterality: N/A;    BRONCHOSCOPY WITH FLUOROSCOPY Right 05/11/2023    Procedure: BRONCHOSCOPY, WITH FLUOROSCOPY;  Surgeon: Neva Christian MD;  Location: [facility];  Service: Pulmonary;  Laterality: Right;    BRONCHOSCOPY WITH FLUOROSCOPY N/A 12/15/2023    Procedure: BRONCHOSCOPY, WITH FLUOROSCOPY;  Surgeon: Neva Christian MD;  Location: [facility];  Service: Pulmonary;  Laterality: N/A;    CATARACT EXTRACTION BILATERAL W/ ANTERIOR VITRECTOMY Bilateral     CHOLECYSTECTOMY      COLONOSCOPY      COLONOSCOPY N/A 04/20/2023    Procedure: COLONOSCOPY;  Surgeon: Sabino Stephenson MD;  Location: [facility];  Service: Endoscopy;  Laterality: N/A;    CORONARY STENT PLACEMENT  10/2023    x 3    ESOPHAGOGASTRODUODENOSCOPY N/A 06/20/2018    Procedure: EGD (ESOPHAGOGASTRODUODENOSCOPY);  Surgeon: Sabino Stephenson MD;  Location: [facility];  Service: Endoscopy;  Laterality: N/A;    ESOPHAGOGASTRODUODENOSCOPY N/A 03/31/2023    Procedure: EGD  (ESOPHAGOGASTRODUODENOSCOPY);  Surgeon: Sabino Stephenson MD;  Location: [facility] ENDO;  Service: Endoscopy;  Laterality: N/A;    ESOPHAGOGASTRODUODENOSCOPY N/A 12/11/2023    Procedure: EGD (ESOPHAGOGASTRODUODENOSCOPY);  Surgeon: Pretty Salgado MD;  Location: [facility] ENDO;  Service: Endoscopy;  Laterality: N/A;    HYSTERECTOMY  1976    partial    INJECTION OF ANESTHETIC AGENT AROUND MEDIAL BRANCH NERVES INNERVATING LUMBAR FACET JOINT Right 05/10/2023    Procedure: Block-nerve-Lateral-branch-lumbar;  Surgeon: Agustin Robles MD;  Location: [facility] OR;  Service: Pain Management;  Laterality: Right;  L5 and s1,s2 LBB    INJECTION OF ANESTHETIC AGENT AROUND MEDIAL BRANCH NERVES INNERVATING LUMBAR FACET JOINT Right 05/30/2023    Procedure: Block-nerve-medial branch-lumbar;  Surgeon: Agustin Robles MD;  Location: [facility];  Service: Pain Management;  Laterality: Right;  L5, s1 ,s2 LBB #2    INJECTION OF ANESTHETIC AGENT AROUND NERVE Right 10/31/2023    Procedure: INTERCOSTAL NERVE BLOCK;  Surgeon: Washington Shankar MD;  Location: [facility];  Service: Cardiothoracic;  Laterality: Right;    INJECTION, SACROILIAC JOINT Right 01/26/2023    Procedure: INJECTION,SACROILIAC JOINT;  Surgeon: Agustin Robles MD;  Location: [facility] OR;  Service: Pain Management;  Laterality: Right;    INSERTION OF TUNNELED CENTRAL VENOUS CATHETER (CVC) WITH SUBCUTANEOUS PORT Right 10/19/2020    Procedure: INSERTION, PORT-A-CATH;  Surgeon: Misti Mcfarland MD;  Location: [facility] OR;  Service: General;  Laterality: Right;    INTRAMEDULLARY RODDING OF TROCHANTER OF FEMUR Right 11/28/2021    Procedure: INSERTION, INTRAMEDULLARY LUC, FEMUR, TROCHANTER/RIGHT TFN DR FAJARDO NOTIFIED REP;  Surgeon: Kp Fajardo MD;  Location: [facility] OR;  Service: Orthopedics;  Laterality: Right;  SKIP    ROBOT-ASSISTED LAPAROSCOPIC LYMPHADENECTOMY USING DA NELLA XI N/A 09/21/2020    Procedure: XI ROBOTIC LYMPHADENECTOMY-pelvic and kell-aortic;  Surgeon: Altagracia Ray MD;  Location:  [facility] OR;  Service: OB/GYN;  Laterality: N/A;    ROBOT-ASSISTED LAPAROSCOPIC OMENTECTOMY USING DA NELLA XI N/A 09/21/2020    Procedure: XI ROBOTIC OMENTECTOMY;  Surgeon: Altagracia Ray MD;  Location: [facility] OR;  Service: OB/GYN;  Laterality: N/A;    ROBOT-ASSISTED LAPAROSCOPIC SALPINGO-OOPHORECTOMY USING DA NELLA XI Bilateral 09/21/2020    Procedure: XI ROBOTIC SALPINGO-OOPHORECTOMY;  Surgeon: Altagracia Ray MD;  Location: [facility] OR;  Service: OB/GYN;  Laterality: Bilateral;    THORACOSCOPIC BIOPSY OF PLEURA Right 10/31/2023    Procedure: VATS, WITH PLEURA BIOPSY;  Surgeon: Washington Shankar MD;  Location: [facility] OR;  Service: Cardiothoracic;  Laterality: Right;  FROZEN SECTION   **KRISTEN-ASSISDT**    THORACOTOMY Right 10/31/2023    Procedure: THORACOTOMY;  Surgeon: Washington Shankar MD;  Location: [facility] OR;  Service: Cardiothoracic;  Laterality: Right;    UPPER GASTROINTESTINAL ENDOSCOPY         Review of patient's allergies indicates:  No Known Allergies    No current facility-administered medications on file prior to encounter.     Current Outpatient Medications on File Prior to Encounter   Medication Sig    amiodarone (PACERONE) 200 MG Tab Take 1 tablet (200 mg total) by mouth 2 (two) times daily.    apixaban (ELIQUIS) 2.5 mg Tab Take 1 tablet (2.5 mg total) by mouth 2 (two) times daily.    folic acid (FOLVITE) 1 MG tablet Take 1 tablet (1 mg total) by mouth once daily.    gabapentin (NEURONTIN) 100 MG capsule Take 1 capsule (100 mg total) by mouth 2 (two) times daily.    magnesium oxide (MAG-OX) 400 mg (241.3 mg magnesium) tablet Take 1 tablet (400 mg total) by mouth once daily. Patient requested refill    metoprolol succinate (TOPROL-XL) 25 MG 24 hr tablet Take 1 tablet (25 mg total) by mouth once daily.    pantoprazole (PROTONIX) 40 MG tablet Take 1 tablet (40 mg total) by mouth once daily. (Patient taking differently: Take 40 mg by mouth daily as needed (Heartburn).)    sucralfate (CARAFATE) 100 mg/mL  suspension Take 10 mLs (1 g total) by mouth 4 (four) times daily.    VIT A/VIT C/VIT E/ZINC/COPPER (ICAPS AREDS ORAL) Take 1 capsule by mouth 2 (two) times a day.     empagliflozin (JARDIANCE) 10 mg tablet Take 1 tablet (10 mg total) by mouth once daily. (Patient not taking: Reported on 2024)    furosemide (LASIX) 20 MG tablet Take 1 tablet (20 mg total) by mouth daily as needed (for lower extremity edema and shortness of breath or >3 lbs weight gain in 24 hours).    HYDROcodone-acetaminophen (NORCO) 5-325 mg per tablet Take 1 tablet by mouth every 6 (six) hours as needed for Pain. (Patient not taking: Reported on 2024)    LORazepam (ATIVAN) 1 MG tablet Take 1 tablet (1 mg total) by mouth every 6 (six) hours as needed for Anxiety (insomnia). (Patient not taking: Reported on 2024)    spironolactone (ALDACTONE) 25 MG tablet Take 0.5 tablets (12.5 mg total) by mouth once daily.     Family History       Problem Relation (Age of Onset)    Breast cancer Maternal Grandmother, Paternal Grandmother    Cancer Mother (57), Father (56)    Colon polyps Daughter    Melanoma Brother    Skin cancer Sister, Brother, Brother          Tobacco Use    Smoking status: Former     Current packs/day: 0.00     Average packs/day: 1 pack/day for 20.0 years (20.0 ttl pk-yrs)     Types: Cigarettes     Start date:      Quit date:      Years since quittin.8    Smokeless tobacco: Never    Tobacco comments:     quit 40 yrs ago   Substance and Sexual Activity    Alcohol use: Yes     Alcohol/week: 1.0 standard drink of alcohol     Types: 1 Glasses of wine per week     Comment: occasional    Drug use: No    Sexual activity: Not Currently     Partners: Male     Review of Systems   Constitutional:  Positive for chills. Negative for fever.   HENT:  Negative for sore throat.    Eyes:  Negative for visual disturbance.   Respiratory:  Positive for cough and shortness of breath. Negative for wheezing.    Cardiovascular:  Negative  for chest pain and palpitations.   Gastrointestinal:  Negative for abdominal pain, nausea and vomiting.   Endocrine: Negative for polydipsia and polyuria.   Genitourinary:  Negative for dysuria, frequency and urgency.   Skin:  Negative for rash.   Neurological:  Negative for syncope.   Psychiatric/Behavioral:  Negative for confusion.      Objective:     Vital Signs (Most Recent):  Temp: 98.7 °F (37.1 °C) (11/01/24 0926)  Pulse: 98 (11/01/24 1236)  Resp: 20 (11/01/24 0926)  BP: (!) 151/70 (11/01/24 1236)  SpO2: 100 % (11/01/24 1236) Vital Signs (24h Range):  Temp:  [98.7 °F (37.1 °C)] 98.7 °F (37.1 °C)  Pulse:  [] 98  Resp:  [20] 20  SpO2:  [60 %-100 %] 100 %  BP: (140-151)/(65-70) 151/70     Weight: 52.1 kg (114 lb 13.8 oz)  Body mass index is 19.72 kg/m².     Physical Exam  Vitals reviewed.   Constitutional:       Appearance: Normal appearance.   HENT:      Head: Normocephalic and atraumatic.      Mouth/Throat:      Mouth: Mucous membranes are dry.   Eyes:      Extraocular Movements: Extraocular movements intact.      Conjunctiva/sclera: Conjunctivae normal.      Pupils: Pupils are equal, round, and reactive to light.   Cardiovascular:      Rate and Rhythm: Normal rate and regular rhythm.      Pulses: Normal pulses.      Heart sounds: Normal heart sounds.   Pulmonary:      Comments: Decrease bilateral lung fields. Sitting in  bed in no acute distress on non rebreather.  Abdominal:      General: Abdomen is flat. Bowel sounds are normal. There is no distension.      Palpations: Abdomen is soft.      Tenderness: There is no abdominal tenderness.   Musculoskeletal:         General: Normal range of motion.      Cervical back: Normal range of motion and neck supple.   Skin:     General: Skin is warm.   Neurological:      General: No focal deficit present.      Mental Status: She is alert and oriented to person, place, and time.   Psychiatric:         Mood and Affect: Mood normal.         Behavior: Behavior normal.                Significant Labs: All pertinent labs within the past 24 hours have been reviewed.    Significant Imaging: I have reviewed all pertinent imaging results/findings within the past 24 hours.  Assessment/Plan:     * Severe sepsis  This patient does have evidence of infective focus  My overall impression is sepsis.  Source: Respiratory  Antibiotics given-   Antibiotics (72h ago, onward)      Start     Stop Route Frequency Ordered    11/01/24 1300  mupirocin 2 % ointment         11/06/24 0859 Nasl 2 times daily 11/01/24 1200    11/01/24 1130  ceFEPIme injection 2 g         11/01/24 2329 IV ED 1 Time 11/01/24 1129          Latest lactate reviewed-  Recent Labs   Lab 11/01/24  0955   POCLAC 2.17     - Organ dysfunction indicated by acute on chronic respiratory failure  - CTA chest showed finding of Extensive interstitial and airspace opacities throughout both lungs, having significantly worsened in the left lung compared to prior CT.  Multifocal pneumonia, drug reaction, and or developing ARDS could have this appearance.   - Patient was started on cefepime, I will add atypical coverage as well as vanc.  - Consult Pulmonary, and Infectious Disease.  - hold IV fluids given effusions seen on images.        Multifocal pneumonia  Patient was started on cefepime, I will add atypical coverage as well as vanc.  Consult Pulmonary, and Infectious Disease.    Antibiotics (From admission, onward)      Start     Stop Route Frequency Ordered    11/01/24 1300  mupirocin 2 % ointment         11/06/24 0859 Nasl 2 times daily 11/01/24 1200    11/01/24 1130  ceFEPIme injection 2 g         11/01/24 2329 IV ED 1 Time 11/01/24 1129            Microbiology Results (last 7 days)       Procedure Component Value Units Date/Time    Blood culture x two cultures. Draw prior to antibiotics. [[id number]] Collected: 11/01/24 0945    Order Status: Sent Specimen: Blood from Peripheral, Antecubital, Left Updated: 11/01/24 1007    Blood culture x  two cultures. Draw prior to antibiotics. [[id number]] Collected: 11/01/24 0951    Order Status: Sent Specimen: Blood from Peripheral, Antecubital, Right Updated: 11/01/24 1007            Acute on chronic hypoxic respiratory failure  Likely secondary to her bilateral extensive pneumonia.  Continue non-rebreather.  Will order ABG.  Wean as tolerated.  Patient was on 4 L nasal cannula outpt.   CTA chest ruled out PE.  Echo done last month showed normal systolic function.   Consult Pulmonary.    Pleural effusion  Bilateral, stable.   Appreciate pulmonary input regarding possible therapeutic and diagnostic thoracentesis.       Malignant neoplasm of lower lobe of right lung  Oncology follow up outpt.      Atrial fibrillation  Patient has paroxysmal (<7 days) atrial fibrillation. Patient is currently in sinus rhythm. [id number] Score: 5. The patients heart rate in the last 24 hours is as follows:  Pulse  Min: 89  Max: 103     Antiarrhythmics  Amiodarone and metoprolol    Anticoagulants  Eliquis    Plan  - Replete lytes with a goal of K>4, Mg >2  - Patient is anticoagulated, see medications listed above.  - Patient's afib is currently controlled      VTE Risk Mitigation (From admission, onward)           Ordered     apixaban tablet 2.5 mg  2 times daily         11/01/24 1316     IP VTE HIGH RISK PATIENT  Once         11/01/24 1158     Place sequential compression device  Until discontinued         11/01/24 1158                                    Jaimie Marcum MD  Department of Hospital Medicine  [facility] - Emergency Dept

## 2024-11-01 NOTE — ASSESSMENT & PLAN NOTE
Patient was started on cefepime, I will add atypical coverage as well as vanc.  Consult Pulmonary, and Infectious Disease.    Antibiotics (From admission, onward)      Start     Stop Route Frequency Ordered    11/01/24 1300  mupirocin 2 % ointment         11/06/24 0859 Nasl 2 times daily 11/01/24 1200    11/01/24 1130  ceFEPIme injection 2 g         11/01/24 2329 IV ED 1 Time 11/01/24 1129            Microbiology Results (last 7 days)       Procedure Component Value Units Date/Time    Blood culture x two cultures. Draw prior to antibiotics. [[id number]] Collected: 11/01/24 0945    Order Status: Sent Specimen: Blood from Peripheral, Antecubital, Left Updated: 11/01/24 1007    Blood culture x two cultures. Draw prior to antibiotics. [[id number]] Collected: 11/01/24 0951    Order Status: Sent Specimen: Blood from Peripheral, Antecubital, Right Updated: 11/01/24 1007

## 2024-11-01 NOTE — HPI
84-year-old female with a past medical history of lung cancer actively getting chemo last of which was 5 days ago, who presented to the ER because of generalized weakness.  According to the patient, she woke up today, tried to get out of bed, but felt very weak, and slid beside the bed.  Did not hit her head, and did not lose consciousness.  She however could not get up however, and her  could not assist.  911 was called.  On arrival of EMS, patient was satting 88% on 4 L, and appeared to be short of breath.  She was brought to the ER for evaluation.    In the ER, vitals showed a blood pressure of 145/65, heart rate of 103, respiratory rate of 20, afebrile satting 60% on 4 L.  Immediately patient was placed on non-rebreather.  CBC with white count of 16.8, and macrocytic anemia.  CMP showed hypokalemia of 3.4.  First troponin is elevated at 46.  Point of care Lactic acid was 2.1.  Blood cultures were collected.  EKG showed sinus rhythm.  Chest x-ray showed no significant change of bilateral diffuse mixed interstitial and airspace lung opacities.  CTA chest was done, and it was negative for PE, but showed extensive interstitial and airspace opacities throughout both lungs, having significantly worsened in the left lung compared to prior CT. Unchanged small to moderate sized bilateral pleural effusions.  Cefepime was ordered, and patient will be admitted to Medicine for further care of her severe sepsis.

## 2024-11-01 NOTE — PHARMACY MED REC
"Admission Medication History     The home medication history was taken by Louis Haro.    You may go to "Admission" then "Reconcile Home Medications" tabs to review and/or act upon these items.     The home medication list has been updated by the Pharmacy department.   Please read ALL comments highlighted in yellow.   Please address this information as you see fit.    Feel free to contact us if you have any questions or require assistance.        Medications listed below were obtained from: Patient/family and Analytic software- [employer]  No current facility-administered medications on file prior to encounter.     Current Outpatient Medications on File Prior to Encounter   Medication Sig Dispense Refill    amiodarone (PACERONE) 200 MG Tab Take 1 tablet (200 mg total) by mouth 2 (two) times daily. 60 tablet 0    apixaban (ELIQUIS) 2.5 mg Tab Take 1 tablet (2.5 mg total) by mouth 2 (two) times daily. 60 tablet 0    empagliflozin (JARDIANCE) 10 mg tablet Take 1 tablet (10 mg total) by mouth once daily. (Patient not taking: Reported on 11/1/2024) 90 tablet 3    folic acid (FOLVITE) 1 MG tablet Take 1 tablet (1 mg total) by mouth once daily. 100 tablet 3    furosemide (LASIX) 20 MG tablet Take 1 tablet (20 mg total) by mouth daily as needed (for lower extremity edema and shortness of breath or >3 lbs weight gain in 24 hours). 30 tablet 1    gabapentin (NEURONTIN) 100 MG capsule Take 1 capsule (100 mg total) by mouth 2 (two) times daily. 180 capsule 3    HYDROcodone-acetaminophen (NORCO) 5-325 mg per tablet Take 1 tablet by mouth every 6 (six) hours as needed for Pain. (Patient not taking: Reported on 11/1/2024)      LORazepam (ATIVAN) 1 MG tablet Take 1 tablet (1 mg total) by mouth every 6 (six) hours as needed for Anxiety (insomnia). (Patient not taking: Reported on 11/1/2024) 30 tablet 2    magnesium oxide (MAG-OX) 400 mg (241.3 mg magnesium) tablet Take 1 tablet (400 mg total) by mouth once daily. Patient requested " refill 90 tablet 3    metoprolol succinate (TOPROL-XL) 25 MG 24 hr tablet Take 1 tablet (25 mg total) by mouth once daily. 90 tablet 3    pantoprazole (PROTONIX) 40 MG tablet Take 1 tablet (40 mg total) by mouth once daily. (Patient taking differently: Take 40 mg by mouth daily as needed (Heartburn).) 90 tablet 3    spironolactone (ALDACTONE) 25 MG tablet Take 0.5 tablets (12.5 mg total) by mouth once daily. 45 tablet 3    sucralfate (CARAFATE) 100 mg/mL suspension Take 10 mLs (1 g total) by mouth 4 (four) times daily. 473 mL 5    VIT A/VIT C/VIT E/ZINC/COPPER (ICAPS AREDS ORAL) Take 1 capsule by mouth 2 (two) times a day.          Louis Haro  EXT 1924                .

## 2024-11-01 NOTE — ASSESSMENT & PLAN NOTE
Likely secondary to her bilateral extensive pneumonia.  Continue non-rebreather.  Will order ABG.  Wean as tolerated.  Patient was on 4 L nasal cannula outpt.   CTA chest ruled out PE.  Echo done last month showed normal systolic function.   Consult Pulmonary.

## 2024-11-01 NOTE — ED NOTES
Patient sating 60s on 4L NC during triage. Patient placed on NRB 15L. Patient states no complaints of SOB or chest pain at this moment.

## 2024-11-01 NOTE — PROGRESS NOTES
"Pharmacokinetic Initial Assessment: IV Vancomycin    Assessment/Plan:    Initiate intravenous vancomycin with loading dose of 1000 mg once with subsequent doses when random concentrations are less than 20 mcg/mL  Desired empiric serum trough concentration is 10 to 15 mcg/mL  Draw vancomycin random level on 11/2 at 1300.  Pharmacy will continue to follow and monitor vancomycin.      Please contact pharmacy at extension [phone] with any questions regarding this assessment.     Thank you for the consult,   Chadwick Patrickuyen       Patient brief summary:  Alexa Otero is a 84 y.o. female initiated on antimicrobial therapy with IV Vancomycin for treatment of suspected lower respiratory infection    Drug Allergies:   Review of patient's allergies indicates:  No Known Allergies    Actual Body Weight:   52.1 kg    Renal Function:   Estimated Creatinine Clearance: 35.1 mL/min (based on SCr of 1 mg/dL).,     Dialysis Method (if applicable):  N/A    CBC (last 72 hours):  Recent Labs   Lab Result Units 11/01/24  0950   WBC K/uL 16.89*   Hemoglobin g/dL 8.7*   Hematocrit % 28.5*   Platelets K/uL 253   Gran % % 96.8*   Lymph % % 0.9*   Mono % % 0.4*   Eosinophil % % 0.0   Basophil % % 0.1   Differential Method  Automated       Metabolic Panel (last 72 hours):  Recent Labs   Lab Result Units 11/01/24  0950   Sodium mmol/L 138   Potassium mmol/L 3.4*   Chloride mmol/L 99   CO2 mmol/L 29   Glucose mg/dL 191*   BUN mg/dL 24*   Creatinine mg/dL 1.0   Albumin g/dL 3.0*   Total Bilirubin mg/dL 0.6   Alkaline Phosphatase U/L 46*   AST U/L 18   ALT U/L 9*   Magnesium mg/dL 1.8       Drug levels (last 3 results):  No results for input(s): "VANCOMYCINRA", "VANCORANDOM", "VANCOMYCINPE", "VANCOPEAK", "VANCOMYCINTR", "VANCOTROUGH" in the last 72 hours.    Microbiologic Results:  Microbiology Results (last 7 days)       Procedure Component Value Units Date/Time    Blood culture x two cultures. Draw prior to antibiotics. [[id number]] Collected: " 11/01/24 0945    Order Status: Sent Specimen: Blood from Peripheral, Antecubital, Left Updated: 11/01/24 1007    Blood culture x two cultures. Draw prior to antibiotics. [[id number]] Collected: 11/01/24 0951    Order Status: Sent Specimen: Blood from Peripheral, Antecubital, Right Updated: 11/01/24 1007

## 2024-11-01 NOTE — ASSESSMENT & PLAN NOTE
Patient has paroxysmal (<7 days) atrial fibrillation. Patient is currently in sinus rhythm. [id number] Score: 5. The patients heart rate in the last 24 hours is as follows:  Pulse  Min: 89  Max: 103     Antiarrhythmics  Amiodarone and metoprolol    Anticoagulants  Eliquis    Plan  - Replete lytes with a goal of K>4, Mg >2  - Patient is anticoagulated, see medications listed above.  - Patient's afib is currently controlled

## 2024-11-01 NOTE — SUBJECTIVE & OBJECTIVE
Past Medical History:   Diagnosis Date    Arthritis     Cardiac arrest 10/2023    Cataract     Chronic obstructive pulmonary disease, unspecified COPD type 03/20/2024    Colon polyp     Coronary artery disease 03/20/2024    Diabetes mellitus type II 2012    Encounter for blood transfusion     Extra-ovarian endometrioid adenocarcinoma 2020    GERD (gastroesophageal reflux disease)     History of chronic cough     clear productive    Hyperlipidemia     Hypertension     Macular degeneration     Ovarian cancer     PONV (postoperative nausea and vomiting)     Squamous cell carcinoma 1980's    precancer of cervix       Past Surgical History:   Procedure Laterality Date    AUGMENTATION OF BREAST      BRONCHOSCOPY N/A 12/12/2023    Procedure: BRONCHOSCOPY;  Surgeon: Neva Christian MD;  Location: [facility] ENDO;  Service: ENT;  Laterality: N/A;    BRONCHOSCOPY WITH FLUOROSCOPY Right 05/11/2023    Procedure: BRONCHOSCOPY, WITH FLUOROSCOPY;  Surgeon: Neva Christian MD;  Location: [facility] ENDO;  Service: Pulmonary;  Laterality: Right;    BRONCHOSCOPY WITH FLUOROSCOPY N/A 12/15/2023    Procedure: BRONCHOSCOPY, WITH FLUOROSCOPY;  Surgeon: Neva Christian MD;  Location: [facility] ENDO;  Service: Pulmonary;  Laterality: N/A;    CATARACT EXTRACTION BILATERAL W/ ANTERIOR VITRECTOMY Bilateral     CHOLECYSTECTOMY      COLONOSCOPY      COLONOSCOPY N/A 04/20/2023    Procedure: COLONOSCOPY;  Surgeon: Sabino Stephenson MD;  Location: [facility];  Service: Endoscopy;  Laterality: N/A;    CORONARY STENT PLACEMENT  10/2023    x 3    ESOPHAGOGASTRODUODENOSCOPY N/A 06/20/2018    Procedure: EGD (ESOPHAGOGASTRODUODENOSCOPY);  Surgeon: Sabino Stephenson MD;  Location: [facility] ENDO;  Service: Endoscopy;  Laterality: N/A;    ESOPHAGOGASTRODUODENOSCOPY N/A 03/31/2023    Procedure: EGD (ESOPHAGOGASTRODUODENOSCOPY);  Surgeon: Sabino Stephenson MD;  Location: [facility] ENDO;  Service: Endoscopy;  Laterality: N/A;    ESOPHAGOGASTRODUODENOSCOPY N/A 12/11/2023     Procedure: EGD (ESOPHAGOGASTRODUODENOSCOPY);  Surgeon: Pretty Salgado MD;  Location: [facility] ENDO;  Service: Endoscopy;  Laterality: N/A;    HYSTERECTOMY  1976    partial    INJECTION OF ANESTHETIC AGENT AROUND MEDIAL BRANCH NERVES INNERVATING LUMBAR FACET JOINT Right 05/10/2023    Procedure: Block-nerve-Lateral-branch-lumbar;  Surgeon: Agustin Robles MD;  [facility];  Service: Pain Management;  Laterality: Right;  L5 and s1,s2 LBB    INJECTION OF ANESTHETIC AGENT AROUND MEDIAL BRANCH NERVES INNERVATING LUMBAR FACET JOINT Right 05/30/2023    Procedure: Block-nerve-medial branch-lumbar;  Surgeon: Agustin Robles MD;  [facility];  Service: Pain Management;  Laterality: Right;  L5, s1 ,s2 LBB #2    INJECTION OF ANESTHETIC AGENT AROUND NERVE Right 10/31/2023    Procedure: INTERCOSTAL NERVE BLOCK;  Surgeon: Washington Shankar MD;  Location: [facility] OR;  Service: Cardiothoracic;  Laterality: Right;    INJECTION, SACROILIAC JOINT Right 01/26/2023    Procedure: INJECTION,SACROILIAC JOINT;  Surgeon: Agustin Robles MD;  [facility];  Service: Pain Management;  Laterality: Right;    INSERTION OF TUNNELED CENTRAL VENOUS CATHETER (CVC) WITH SUBCUTANEOUS PORT Right 10/19/2020    Procedure: INSERTION, PORT-A-CATH;  Surgeon: Misti Mcfarland MD;  Location: [facility] OR;  Service: General;  Laterality: Right;    INTRAMEDULLARY RODDING OF TROCHANTER OF FEMUR Right 11/28/2021    Procedure: INSERTION, INTRAMEDULLARY LUC, FEMUR, TROCHANTER/RIGHT TFN DR FAJARDO NOTIFIED REP;  Surgeon: Kp Fajardo MD;  Location: [facility] OR;  Service: Orthopedics;  Laterality: Right;  SKIP    ROBOT-ASSISTED LAPAROSCOPIC LYMPHADENECTOMY USING DA NELLA XI N/A 09/21/2020    Procedure: XI ROBOTIC LYMPHADENECTOMY-pelvic and kell-aortic;  Surgeon: Altagracia Ray MD;  Location: [facility] OR;  Service: OB/GYN;  Laterality: N/A;    ROBOT-ASSISTED LAPAROSCOPIC OMENTECTOMY USING DA NELLA XI N/A 09/21/2020    Procedure: XI ROBOTIC OMENTECTOMY;  Surgeon:  Altagracia Ray MD;  Location: [facility] OR;  Service: OB/GYN;  Laterality: N/A;    ROBOT-ASSISTED LAPAROSCOPIC SALPINGO-OOPHORECTOMY USING DA NELLA XI Bilateral 09/21/2020    Procedure: XI ROBOTIC SALPINGO-OOPHORECTOMY;  Surgeon: Altagracia Ray MD;  Location: [facility] OR;  Service: OB/GYN;  Laterality: Bilateral;    THORACOSCOPIC BIOPSY OF PLEURA Right 10/31/2023    Procedure: VATS, WITH PLEURA BIOPSY;  Surgeon: Washington Shankar MD;  Location: [facility] OR;  Service: Cardiothoracic;  Laterality: Right;  FROZEN SECTION   **KRISTEN-ASSISDT**    THORACOTOMY Right 10/31/2023    Procedure: THORACOTOMY;  Surgeon: Washington Shankar MD;  Location: [facility] OR;  Service: Cardiothoracic;  Laterality: Right;    UPPER GASTROINTESTINAL ENDOSCOPY         Review of patient's allergies indicates:  No Known Allergies    No current facility-administered medications on file prior to encounter.     Current Outpatient Medications on File Prior to Encounter   Medication Sig    amiodarone (PACERONE) 200 MG Tab Take 1 tablet (200 mg total) by mouth 2 (two) times daily.    apixaban (ELIQUIS) 2.5 mg Tab Take 1 tablet (2.5 mg total) by mouth 2 (two) times daily.    folic acid (FOLVITE) 1 MG tablet Take 1 tablet (1 mg total) by mouth once daily.    gabapentin (NEURONTIN) 100 MG capsule Take 1 capsule (100 mg total) by mouth 2 (two) times daily.    magnesium oxide (MAG-OX) 400 mg (241.3 mg magnesium) tablet Take 1 tablet (400 mg total) by mouth once daily. Patient requested refill    metoprolol succinate (TOPROL-XL) 25 MG 24 hr tablet Take 1 tablet (25 mg total) by mouth once daily.    pantoprazole (PROTONIX) 40 MG tablet Take 1 tablet (40 mg total) by mouth once daily. (Patient taking differently: Take 40 mg by mouth daily as needed (Heartburn).)    sucralfate (CARAFATE) 100 mg/mL suspension Take 10 mLs (1 g total) by mouth 4 (four) times daily.    VIT A/VIT C/VIT E/ZINC/COPPER (ICAPS AREDS ORAL) Take 1 capsule by mouth 2 (two) times a day.      empagliflozin (JARDIANCE) 10 mg tablet Take 1 tablet (10 mg total) by mouth once daily. (Patient not taking: Reported on 2024)    furosemide (LASIX) 20 MG tablet Take 1 tablet (20 mg total) by mouth daily as needed (for lower extremity edema and shortness of breath or >3 lbs weight gain in 24 hours).    HYDROcodone-acetaminophen (NORCO) 5-325 mg per tablet Take 1 tablet by mouth every 6 (six) hours as needed for Pain. (Patient not taking: Reported on 2024)    LORazepam (ATIVAN) 1 MG tablet Take 1 tablet (1 mg total) by mouth every 6 (six) hours as needed for Anxiety (insomnia). (Patient not taking: Reported on 2024)    spironolactone (ALDACTONE) 25 MG tablet Take 0.5 tablets (12.5 mg total) by mouth once daily.     Family History       Problem Relation (Age of Onset)    Breast cancer Maternal Grandmother, Paternal Grandmother    Cancer Mother (57), Father (56)    Colon polyps Daughter    Melanoma Brother    Skin cancer Sister, Brother, Brother          Tobacco Use    Smoking status: Former     Current packs/day: 0.00     Average packs/day: 1 pack/day for 20.0 years (20.0 ttl pk-yrs)     Types: Cigarettes     Start date:      Quit date:      Years since quittin.8    Smokeless tobacco: Never    Tobacco comments:     quit 40 yrs ago   Substance and Sexual Activity    Alcohol use: Yes     Alcohol/week: 1.0 standard drink of alcohol     Types: 1 Glasses of wine per week     Comment: occasional    Drug use: No    Sexual activity: Not Currently     Partners: Male     Review of Systems   Constitutional:  Positive for chills. Negative for fever.   HENT:  Negative for sore throat.    Eyes:  Negative for visual disturbance.   Respiratory:  Positive for cough and shortness of breath. Negative for wheezing.    Cardiovascular:  Negative for chest pain and palpitations.   Gastrointestinal:  Negative for abdominal pain, nausea and vomiting.   Endocrine: Negative for polydipsia and polyuria.    Genitourinary:  Negative for dysuria, frequency and urgency.   Skin:  Negative for rash.   Neurological:  Negative for syncope.   Psychiatric/Behavioral:  Negative for confusion.      Objective:     Vital Signs (Most Recent):  Temp: 98.7 °F (37.1 °C) (11/01/24 0926)  Pulse: 98 (11/01/24 1236)  Resp: 20 (11/01/24 0926)  BP: (!) 151/70 (11/01/24 1236)  SpO2: 100 % (11/01/24 1236) Vital Signs (24h Range):  Temp:  [98.7 °F (37.1 °C)] 98.7 °F (37.1 °C)  Pulse:  [] 98  Resp:  [20] 20  SpO2:  [60 %-100 %] 100 %  BP: (140-151)/(65-70) 151/70     Weight: 52.1 kg (114 lb 13.8 oz)  Body mass index is 19.72 kg/m².     Physical Exam  Vitals reviewed.   Constitutional:       Appearance: Normal appearance.   HENT:      Head: Normocephalic and atraumatic.      Mouth/Throat:      Mouth: Mucous membranes are dry.   Eyes:      Extraocular Movements: Extraocular movements intact.      Conjunctiva/sclera: Conjunctivae normal.      Pupils: Pupils are equal, round, and reactive to light.   Cardiovascular:      Rate and Rhythm: Normal rate and regular rhythm.      Pulses: Normal pulses.      Heart sounds: Normal heart sounds.   Pulmonary:      Comments: Decrease bilateral lung fields. Sitting in  bed in no acute distress on non rebreather.  Abdominal:      General: Abdomen is flat. Bowel sounds are normal. There is no distension.      Palpations: Abdomen is soft.      Tenderness: There is no abdominal tenderness.   Musculoskeletal:         General: Normal range of motion.      Cervical back: Normal range of motion and neck supple.   Skin:     General: Skin is warm.   Neurological:      General: No focal deficit present.      Mental Status: She is alert and oriented to person, place, and time.   Psychiatric:         Mood and Affect: Mood normal.         Behavior: Behavior normal.               Significant Labs: All pertinent labs within the past 24 hours have been reviewed.    Significant Imaging: I have reviewed all pertinent  imaging results/findings within the past 24 hours.

## 2024-11-01 NOTE — ASSESSMENT & PLAN NOTE
Bilateral, stable.   Appreciate pulmonary input regarding possible therapeutic and diagnostic thoracentesis.

## 2024-11-01 NOTE — ASSESSMENT & PLAN NOTE
This patient does have evidence of infective focus  My overall impression is sepsis.  Source: Respiratory  Antibiotics given-   Antibiotics (72h ago, onward)      Start     Stop Route Frequency Ordered    11/01/24 1300  mupirocin 2 % ointment         11/06/24 0859 Nasl 2 times daily 11/01/24 1200    11/01/24 1130  ceFEPIme injection 2 g         11/01/24 2329 IV ED 1 Time 11/01/24 1129          Latest lactate reviewed-  Recent Labs   Lab 11/01/24  0955   POCLAC 2.17     - Organ dysfunction indicated by acute on chronic respiratory failure  - CTA chest showed finding of Extensive interstitial and airspace opacities throughout both lungs, having significantly worsened in the left lung compared to prior CT.  Multifocal pneumonia, drug reaction, and or developing ARDS could have this appearance.   - Patient was started on cefepime, I will add atypical coverage as well as vanc.  - Consult Pulmonary, and Infectious Disease.  - hold IV fluids given effusions seen on images.       Thalidomide Counseling: I discussed with the patient the risks of thalidomide including but not limited to birth defects, anxiety, weakness, chest pain, dizziness, cough and severe allergy.

## 2024-11-01 NOTE — ED PROVIDER NOTES
Encounter Date: 11/1/2024       History     Chief Complaint   Patient presents with    Generalized Weakness     Per EMS, patient slipped out of bed onto floor hitting bottom. Did not hit head. Realized she was weaker than normal. Hx lung cancer. Sating 60s  on 4L in triage.     Patient presents complaining of weakness, feeling faint, slipped to the floor did not hit her head.  Patient is on home oxygen has a history of lung cancer.  She was on chemotherapy.  At the worst symptoms are moderate to severe.  She complains of worsening shortness of breath.  Nothing seems to make it better or worse.      Review of patient's allergies indicates:  No Known Allergies  Past Medical History:   Diagnosis Date    Arthritis     Cardiac arrest 10/2023    Cataract     Chronic obstructive pulmonary disease, unspecified COPD type 03/20/2024    Colon polyp     Coronary artery disease 03/20/2024    Diabetes mellitus type II 2012    Encounter for blood transfusion     Extra-ovarian endometrioid adenocarcinoma 2020    GERD (gastroesophageal reflux disease)     History of chronic cough     clear productive    Hyperlipidemia     Hypertension     Macular degeneration     Ovarian cancer     PONV (postoperative nausea and vomiting)     Squamous cell carcinoma 1980's    precancer of cervix     Past Surgical History:   Procedure Laterality Date    AUGMENTATION OF BREAST      BRONCHOSCOPY N/A 12/12/2023    Procedure: BRONCHOSCOPY;  Surgeon: Neva Christian MD;  Location: [facility];  Service: ENT;  Laterality: N/A;    BRONCHOSCOPY WITH FLUOROSCOPY Right 05/11/2023    Procedure: BRONCHOSCOPY, WITH FLUOROSCOPY;  Surgeon: Neva Christian MD;  Location: [facility] ENDO;  Service: Pulmonary;  Laterality: Right;    BRONCHOSCOPY WITH FLUOROSCOPY N/A 12/15/2023    Procedure: BRONCHOSCOPY, WITH FLUOROSCOPY;  Surgeon: Neva Christian MD;  Location: [facility];  Service: Pulmonary;  Laterality: N/A;    CATARACT EXTRACTION BILATERAL W/ ANTERIOR VITRECTOMY  Bilateral     CHOLECYSTECTOMY      COLONOSCOPY      COLONOSCOPY N/A 04/20/2023    Procedure: COLONOSCOPY;  Surgeon: Sabino Stephenson MD;  Location: [facility] ENDO;  Service: Endoscopy;  Laterality: N/A;    CORONARY STENT PLACEMENT  10/2023    x 3    ESOPHAGOGASTRODUODENOSCOPY N/A 06/20/2018    Procedure: EGD (ESOPHAGOGASTRODUODENOSCOPY);  Surgeon: Sabino Stephenson MD;  Location: [facility] ENDO;  Service: Endoscopy;  Laterality: N/A;    ESOPHAGOGASTRODUODENOSCOPY N/A 03/31/2023    Procedure: EGD (ESOPHAGOGASTRODUODENOSCOPY);  Surgeon: Sabino Stehpenson MD;  Location: [facility] ENDO;  Service: Endoscopy;  Laterality: N/A;    ESOPHAGOGASTRODUODENOSCOPY N/A 12/11/2023    Procedure: EGD (ESOPHAGOGASTRODUODENOSCOPY);  Surgeon: Pretty Salgado MD;  Location: [facility];  Service: Endoscopy;  Laterality: N/A;    HYSTERECTOMY  1976    partial    INJECTION OF ANESTHETIC AGENT AROUND MEDIAL BRANCH NERVES INNERVATING LUMBAR FACET JOINT Right 05/10/2023    Procedure: Block-nerve-Lateral-branch-lumbar;  Surgeon: Agustin Robles MD;  Location: [facility];  Service: Pain Management;  Laterality: Right;  L5 and s1,s2 LBB    INJECTION OF ANESTHETIC AGENT AROUND MEDIAL BRANCH NERVES INNERVATING LUMBAR FACET JOINT Right 05/30/2023    Procedure: Block-nerve-medial branch-lumbar;  Surgeon: Agustin Robles MD;  Location: [facility];  Service: Pain Management;  Laterality: Right;  L5, s1 ,s2 LBB #2    INJECTION OF ANESTHETIC AGENT AROUND NERVE Right 10/31/2023    Procedure: INTERCOSTAL NERVE BLOCK;  Surgeon: Washington Shankar MD;  Location: [facility];  Service: Cardiothoracic;  Laterality: Right;    INJECTION, SACROILIAC JOINT Right 01/26/2023    Procedure: INJECTION,SACROILIAC JOINT;  Surgeon: Agustin Robles MD;  Location: [facility];  Service: Pain Management;  Laterality: Right;    INSERTION OF TUNNELED CENTRAL VENOUS CATHETER (CVC) WITH SUBCUTANEOUS PORT Right 10/19/2020    Procedure: INSERTION, PORT-A-CATH;  Surgeon: Misti Mcfarland MD;  Location: [facility] OR;  Service:  General;  Laterality: Right;    INTRAMEDULLARY RODDING OF TROCHANTER OF FEMUR Right 11/28/2021    Procedure: INSERTION, INTRAMEDULLARY LUC, FEMUR, TROCHANTER/RIGHT TFN DR FAJARDO NOTIFIED REP;  Surgeon: Kp Fajardo MD;  Location: [facility];  Service: Orthopedics;  Laterality: Right;  SKIP    ROBOT-ASSISTED LAPAROSCOPIC LYMPHADENECTOMY USING DA NELLA XI N/A 09/21/2020    Procedure: XI ROBOTIC LYMPHADENECTOMY-pelvic and kell-aortic;  Surgeon: Altagracia Ray MD;  Location: [facility] OR;  Service: OB/GYN;  Laterality: N/A;    ROBOT-ASSISTED LAPAROSCOPIC OMENTECTOMY USING DA NELLA XI N/A 09/21/2020    Procedure: XI ROBOTIC OMENTECTOMY;  Surgeon: Altagracia Ray MD;  Location: [facility] OR;  Service: OB/GYN;  Laterality: N/A;    ROBOT-ASSISTED LAPAROSCOPIC SALPINGO-OOPHORECTOMY USING DA NELLA XI Bilateral 09/21/2020    Procedure: XI ROBOTIC SALPINGO-OOPHORECTOMY;  Surgeon: Altagracia Ray MD;  Location: [facility] OR;  Service: OB/GYN;  Laterality: Bilateral;    THORACOSCOPIC BIOPSY OF PLEURA Right 10/31/2023    Procedure: VATS, WITH PLEURA BIOPSY;  Surgeon: Washington Shankar MD;  Location: [facility];  Service: Cardiothoracic;  Laterality: Right;  FROZEN SECTION   **KRISTEN-ASSISDT**    THORACOTOMY Right 10/31/2023    Procedure: THORACOTOMY;  Surgeon: Washington Shankar MD;  Location: [facility];  Service: Cardiothoracic;  Laterality: Right;    UPPER GASTROINTESTINAL ENDOSCOPY       Family History   Problem Relation Name Age of Onset    Cancer Mother  57        lung    Cancer Father  56        oral    Skin cancer Sister      Skin cancer Brother      Melanoma Brother      Skin cancer Brother      Breast cancer Maternal Grandmother      Breast cancer Paternal Grandmother      Colon polyps Daughter      Psoriasis Neg Hx      Lupus Neg Hx      Eczema Neg Hx      Colon cancer Neg Hx      Crohn's disease Neg Hx      Esophageal cancer Neg Hx      Stomach cancer Neg Hx      Ulcerative colitis Neg Hx       Social History      Tobacco Use    Smoking status: Former     Current packs/day: 0.00     Average packs/day: 1 pack/day for 20.0 years (20.0 ttl pk-yrs)     Types: Cigarettes     Start date:      Quit date: 1981     Years since quittin.8    Smokeless tobacco: Never    Tobacco comments:     quit 40 yrs ago   Substance Use Topics    Alcohol use: Yes     Alcohol/week: 1.0 standard drink of alcohol     Types: 1 Glasses of wine per week     Comment: occasional    Drug use: No     Review of Systems   All other systems reviewed and are negative.      Physical Exam     Initial Vitals [24 0926]   BP Pulse Resp Temp SpO2   (!) 145/65 103 20 98.7 °F (37.1 °C) (!) 60 %      MAP       --         Physical Exam    Nursing note and vitals reviewed.  Constitutional: She appears distressed.   Pleasant, polite, generally ill appearing   HENT:   Head: Normocephalic and atraumatic.   Eyes: EOM are normal.   Neck: Neck supple.   Normal range of motion.  Cardiovascular:  Intact distal pulses.           Pulmonary/Chest: No respiratory distress.   Abdominal: Abdomen is soft.   Musculoskeletal:      Cervical back: Normal range of motion and neck supple.     Neurological: She is alert and oriented to person, place, and time.   Skin: Skin is warm and dry. Capillary refill takes less than 2 seconds.   Psychiatric: She has a normal mood and affect. Her behavior is normal. Judgment and thought content normal.         ED Course   Procedures  Labs Reviewed   CBC W/ AUTO DIFFERENTIAL - Abnormal       Result Value    WBC 16.89 (*)     RBC 2.76 (*)     Hemoglobin 8.7 (*)     Hematocrit 28.5 (*)      (*)     MCH 31.5 (*)     MCHC 30.5 (*)     RDW 18.5 (*)     Platelets 253      MPV 8.9 (*)     Immature Granulocytes 1.8 (*)     Gran # (ANC) 16.4 (*)     Immature Grans (Abs) 0.31 (*)     Lymph # 0.2 (*)     Mono # 0.1 (*)     Eos # 0.0      Baso # 0.01      nRBC 0      Gran % 96.8 (*)     Lymph % 0.9 (*)     Mono % 0.4 (*)     Eosinophil % 0.0       Basophil % 0.1      Platelet Estimate Appears normal      Aniso Slight      Differential Method Automated     COMPREHENSIVE METABOLIC PANEL - Abnormal    Sodium 138      Potassium 3.4 (*)     Chloride 99      CO2 29      Glucose 191 (*)     BUN 24 (*)     Creatinine 1.0      Calcium 8.4 (*)     Total Protein 6.7      Albumin 3.0 (*)     Total Bilirubin 0.6      Alkaline Phosphatase 46 (*)     AST 18      ALT 9 (*)     eGFR 55.9 (*)     Anion Gap 10     B-TYPE NATRIURETIC PEPTIDE - Abnormal     (*)    TROPONIN I HIGH SENSITIVITY - Abnormal    Troponin I High Sensitivity 46.9 (*)    CULTURE, BLOOD   CULTURE, BLOOD   MAGNESIUM    Magnesium 1.8     URINALYSIS, REFLEX TO URINE CULTURE   LACTIC ACID, PLASMA   ISTAT LACTATE    POC Lactate 2.17      Sample VENOUS     POCT LACTATE        ECG Results              EKG 12-lead (In process)        Collection Time Result Time QRS Duration OHS QTC Calculation    11/01/24 09:53:28 11/01/24 11:26:22 56 482                     In process by Interface, Lab In [facility] (11/01/24 11:26:26)                   Narrative:    Test Reason : R06.02,    Vent. Rate : 097 BPM     Atrial Rate : 097 BPM     P-R Int : 156 ms          QRS Dur : 056 ms      QT Int : 380 ms       P-R-T Axes : 044 -06 100 degrees     QTc Int : 482 ms    Normal sinus rhythm  Low voltage QRS  Septal infarct (cited on or before 13-OCT-2023)  Abnormal ECG  When compared with ECG of 25-OCT-2024 10:48,  ST now depressed in Lateral leads    Referred By: [email or web address]   SELF           Confirmed By:                                   Imaging Results              CTA Chest Non-Coronary (PE Studies) (Final result)  Result time 11/01/24 12:51:32      Final result by Davide Jeffrey MD (11/01/24 12:51:32)                   Impression:      1. Negative for pulmonary thromboembolism.  2. Extensive interstitial and airspace opacities throughout both lungs, having significantly worsened in the left lung compared to prior CT.   Multifocal pneumonia, drug reaction, and or developing ARDS could have this appearance.  3. Unchanged small to moderate sized bilateral pleural effusions.  4. Additional observations as described.  .      Electronically signed by: Davide Jeffrey  Date:    11/01/2024  Time:    12:51               Narrative:    EXAMINATION:  CTA CHEST NON CORONARY (PE STUDIES)    CLINICAL HISTORY:  Pulmonary embolism (PE) suspected, high prob; lung cancer    TECHNIQUE:  Thin axial imaging through the chest was performed with 100 mL Omnipaque 350 IV contrast, with sagittal and coronal reformatted images and MIP reconstructions performed, with images stored in the patient's permanent electronic medical record.    FINDINGS:  Comparison to multiple prior exams.  There are no pulmonary arterial filling defects to suggest pulmonary thromboembolism.  The central pulmonary arteries are normal in caliber.    Diffuse calcified plaque involves normal-caliber thoracic aorta, with the heart enlarged and no pericardial effusion.  There are extensive coronary arterial calcifications, with dense mitral annular calcifications.  No enlarged mediastinal or hilar lymph nodes.    There is unchanged volume loss in the right hemithorax, with extensive interstitial and airspace opacities throughout both lungs, in the left lung having significantly worsened compared to the 10/07/2024.  There are scattered lucencies reflecting pulmonary emphysema.  Small to moderate sized low-density bilateral pleural effusions are unchanged, with the central airways patent.  No pneumothorax.    Images of the upper abdomen show stable hepatic hypodensity, cholecystectomy clips, and scattered colon diverticula.  There are no acute fractures or destructive osseous lesions.                                       X-Ray Chest AP Portable (Final result)  Result time 11/01/24 10:39:57      Final result by Kp Leiva DO (11/01/24 10:39:57)                   Impression:      No  significant change of bilateral diffuse mixed interstitial and airspace lung opacities.      Electronically signed by: Kp Leiva  Date:    11/01/2024  Time:    10:39               Narrative:    EXAMINATION:  XR CHEST AP PORTABLE    CLINICAL HISTORY:  Sepsis;    FINDINGS:  Portable chest with comparison chest x-ray 10/10/2024.  Normal cardiomediastinal silhouette.Bilateral diffuse mixed interstitial and airspace lung opacities are unchanged from previous exam.  Right subclavian Port-A-Cath is unchanged.  Pulmonary vasculature is normal. No acute osseous abnormality.                                       Medications   amiodarone tablet 200 mg (has no administration in time range)   apixaban tablet 2.5 mg (has no administration in time range)   folic acid tablet 1 mg (1 mg Oral Given 11/1/24 1402)   gabapentin capsule 100 mg (has no administration in time range)   magnesium oxide tablet 400 mg (400 mg Oral Given 11/1/24 1402)   metoprolol succinate (TOPROL-XL) 24 hr tablet 25 mg (has no administration in time range)   pantoprazole EC tablet 40 mg (40 mg Oral Given 11/1/24 1402)   sucralfate 100 mg/mL suspension 1 g (has no administration in time range)   sodium chloride 0.9% flush 2 mL (has no administration in time range)   melatonin tablet 6 mg (has no administration in time range)   ondansetron injection 4 mg (has no administration in time range)   senna-docusate 8.6-50 mg per tablet 2 tablet (has no administration in time range)   acetaminophen tablet 650 mg (has no administration in time range)   aluminum-magnesium hydroxide-simethicone 200-200-20 mg/5 mL suspension 30 mL (has no administration in time range)   acetaminophen tablet 650 mg (has no administration in time range)   HYDROcodone-acetaminophen 5-325 mg per tablet 1 tablet (has no administration in time range)   naloxone 0.4 mg/mL injection 0.02 mg (has no administration in time range)   potassium bicarbonate disintegrating tablet 50 mEq (has no  administration in time range)   potassium bicarbonate disintegrating tablet 35 mEq (has no administration in time range)   potassium bicarbonate disintegrating tablet 60 mEq (has no administration in time range)   magnesium oxide tablet 800 mg (has no administration in time range)   magnesium oxide tablet 800 mg (has no administration in time range)   potassium, sodium phosphates 280-160-250 mg packet 2 packet (has no administration in time range)   potassium, sodium phosphates 280-160-250 mg packet 2 packet (has no administration in time range)   potassium, sodium phosphates 280-160-250 mg packet 2 packet (has no administration in time range)   glucose chewable tablet 16 g (has no administration in time range)   glucose chewable tablet 24 g (has no administration in time range)   dextrose 50% injection 12.5 g (has no administration in time range)   dextrose 50% injection 25 g (has no administration in time range)   glucagon (human recombinant) injection 1 mg (has no administration in time range)   mupirocin 2 % ointment (1 g Nasal Given 11/1/24 1401)   vancomycin - pharmacy to dose (has no administration in time range)   doxycycline capsule 100 mg (100 mg Oral Given 11/1/24 1402)   vancomycin in dextrose 5 % 1 gram/250 mL IVPB 1,000 mg (1,000 mg Intravenous New Bag 11/1/24 1402)   ceFEPIme injection 2 g (2 g Intravenous Given 11/1/24 1401)   iohexoL (OMNIPAQUE 350) injection 100 mL (100 mLs Intravenous Given 11/1/24 1240)     Medical Decision Making  Patient appears weak and frail    Considerations include but are not limited to pneumonia, acute kidney injury, dehydration, electrolyte abnormalities, less likely heart failure, pulmonary edema    CTA of the chest indeed does reveal multifocal pneumonia    EKG regular rate and rhythm without ST elevation    Patient has received IV cefepime    Patient's lactate is only mildly elevated 2.1    This patient does have evidence of infective focus  My overall impression is  sepsis.  Source: Respiratory  Antibiotics given- Antibiotics (72h ago, onward)    Start     Stop Route Frequency Ordered    06/26/24 1215  piperacillin-tazobactam 3.375 g in dextrose 5 % 100 mL   IVPB (ready to mix)         -- IV Every 8 hours (non-standard times) 06/26/24 1105      Latest lactate reviewed-  Lab             06/26/24                       0901          POCLAC       2.89*         Organ dysfunction indicated by Acute respiratory failure    Fluid challenge Not needed - patient is not hypotensive      Post- resuscitation assessment No - Post resuscitation assessment not needed       Will Not start Pressors- Levophed for MAP of 65  Source control achieved by:  Antibiotics     Patient consulted hospital medicine secondary to multifocal pneumonia.    Amount and/or Complexity of Data Reviewed  Labs: ordered. Decision-making details documented in ED Course.  Radiology: ordered.    Risk  Prescription drug management.  Decision regarding hospitalization.               ED Course as of 11/01/24 1508   Fri Nov 01, 2024   1039 WBC(!): 16.89 [AP]   1040 Hemoglobin(!): 8.7 [AP]   1040 Hematocrit(!): 28.5 [AP]   1040 Platelet Count: 253 [AP]   1040 Sodium: 138 [AP]   1040 Potassium(!): 3.4 [AP]   1040 Chloride: 99 [AP]   1040 CO2: 29 [AP]   1040 Glucose(!): 191 [AP]   1040 BUN(!): 24 [AP]   1040 Creatinine: 1.0 [AP]   1040 Calcium(!): 8.4 [AP]   1040 PROTEIN TOTAL: 6.7 [AP]   1040 Albumin(!): 3.0 [AP]   1040 BILIRUBIN TOTAL: 0.6 [AP]   1040 ALP(!): 46 [AP]   1040 AST: 18 [AP]   1040 ALT(!): 9 [AP]   1040 eGFR(!): 55.9 [AP]   1040 Anion Gap: 10 [AP]   1040 Troponin I High Sensitivity(!): 46.9 [AP]   1040 BNP(!): 501 [AP]   1040 Magnesium : 1.8 [AP]   1040 POC Lactate: 2.17 [AP]      ED Course User Index  [AP] Quinton Briggs MD                           Clinical Impression:  Final diagnoses:  [R06.02] Shortness of breath  [J18.9] Pneumonia due to infectious organism, unspecified laterality, unspecified part of lung  (Primary)          ED Disposition Condition    Admit Stable                Quinton Briggs MD  11/01/24 1505       Quinton Briggs MD  11/01/24 1504

## 2024-11-02 NOTE — RESPIRATORY THERAPY
11/01/24 2030   Patient Assessment/Suction   Level of Consciousness (AVPU) alert   Respiratory Effort Normal;Unlabored   PRE-TX-O2   Device (Oxygen Therapy) high flow nasal cannula   Flow (L/min) (Oxygen Therapy) 15   SpO2 97 %   Pulse Oximetry Type Continuous   $ Pulse Oximetry - Multiple Charge Pulse Oximetry - Multiple   Education   $ Education Oxygen;15 min

## 2024-11-02 NOTE — CONSULTS
"[facility]   Department of Infectious Disease  Consult Note        PATIENT NAME: Alexa Otero  MRN: [id number]  TODAY'S DATE: 11/02/2024  ADMIT DATE: 11/1/2024  LOS: 1 days    CHIEF COMPLAINT: Generalized Weakness (Per EMS, patient slipped out of bed onto floor hitting bottom. Did not hit head. Realized she was weaker than normal. Hx lung cancer. Sating 60s  on 4L in triage.)      PRINCIPLE PROBLEM: Severe sepsis    REASON FOR CONSULT:     ASSESSMENT and PLAN   1. Respiratory failure in a patient with lung cancer and background COPD.  Also CT chest sure increase bilateral pulmonary infiltrates.  Agree with antibiotics for coverage of pneumonia.  Interstitial pneumonitis from Alimta is a possibility.  Check COVID-19 assay and respiratory panel.  Add remdesivir is COVID positive.  Continue other symptomatic management with steroid and oxygen as per hospitalist and pulmonologist.    2.   Lung cancer.  On Alimta    3. COPD.  Management as per pulmonologist and hospitalist.      4. Chronic upper abdominal pain with gastritis.       RECOMMENDATIONS:   Check sputum culture, respiratory panel   Check procalcitonin, LDH, CRP   Continue vancomycin, cefepime and doxycycline    Thank you for this consult. Please send Docitt secure chat with any questions.    Antibiotics (From admission, onward)      Start     Stop Route Frequency Ordered    11/02/24 0200  ceFEPIme injection 1 g         -- IV Every 12 hours (non-standard times) 11/01/24 2114    11/01/24 1422  vancomycin - pharmacy to dose  (vancomycin IVPB (PEDS and ADULTS))        Placed in "And" Linked Group    -- IV pharmacy to manage frequency 11/01/24 1322    11/01/24 1330  doxycycline capsule 100 mg         -- Oral Every 12 hours 11/01/24 1322    11/01/24 1300  mupirocin 2 % ointment         11/06/24 0859 Nasl 2 times daily 11/01/24 1200          Antifungals (From admission, onward)      None           Antivirals (From admission, onward)      None      "       ELLIE      Alexa Otero is a 84 y.o. female she has a history of lung cancer status post lower lobectomy in October 20, 2023 followed by adjuvant chemotherapy from December 20, 2023 through May 20, 2024.  She has resumed chemotherapy again in July 20, 2024 with Alimta.  She has a background COPD and is on 3L oxygen per minute at home.  Also with a chronic intermittent upper abdominal pain for the last 1 year has been diagnosed with gastritis.    Presents to the ER 11/01/2024 with fatigue.  She has slid down to the ground when she got out of bed due to fatigue.  EMS was activated.  She was saturating at 88% on 4 L. brought to the ER for inpatient care.  Chest x-ray was stable.  CT chest showed worsened bilateral infiltrate with also bilateral pleural effusion compared to previous CT.  She was admitted and placed on broad-spectrum antibiotics.  She has been evaluated by pulmonologist with plan for bronchoscopy early next week.      Antibiotic history:  Vancomycin: 11/01/2024-  Cefepime:  11/01/2024-  Doxycycline: 11/01/2024-      Microbiology:   Blood culture 11/01/2024: NGTD     Social History  Marital Status:   Alcohol History:  reports current alcohol use of about 1.0 standard drink of alcohol per week.  Tobacco History:  reports that she quit smoking about 43 years ago. Her smoking use included cigarettes. She started smoking about 63 years ago. She has a 20 pack-year smoking history. She has never used smokeless tobacco.  Drug History:  reports no history of drug use.      Review of patient's allergies indicates:  No Known Allergies  Past Medical History:   Diagnosis Date    Arthritis     Cardiac arrest 10/2023    Cataract     Chronic obstructive pulmonary disease, unspecified COPD type 03/20/2024    Colon polyp     Coronary artery disease 03/20/2024    Diabetes mellitus type II 2012    Encounter for blood transfusion     Extra-ovarian endometrioid adenocarcinoma 2020    GERD (gastroesophageal reflux  disease)     History of chronic cough     clear productive    Hyperlipidemia     Hypertension     Macular degeneration     Ovarian cancer     PONV (postoperative nausea and vomiting)     Squamous cell carcinoma 1980's    precancer of cervix     Past Surgical History:   Procedure Laterality Date    AUGMENTATION OF BREAST      BRONCHOSCOPY N/A 12/12/2023    Procedure: BRONCHOSCOPY;  Surgeon: Neva Christian MD;  Location: [facility] ENDO;  Service: ENT;  Laterality: N/A;    BRONCHOSCOPY WITH FLUOROSCOPY Right 05/11/2023    Procedure: BRONCHOSCOPY, WITH FLUOROSCOPY;  Surgeon: Neva Christian MD;  Location: [facility] ENDO;  Service: Pulmonary;  Laterality: Right;    BRONCHOSCOPY WITH FLUOROSCOPY N/A 12/15/2023    Procedure: BRONCHOSCOPY, WITH FLUOROSCOPY;  Surgeon: Neva Christian MD;  Location: [facility];  Service: Pulmonary;  Laterality: N/A;    CATARACT EXTRACTION BILATERAL W/ ANTERIOR VITRECTOMY Bilateral     CHOLECYSTECTOMY      COLONOSCOPY      COLONOSCOPY N/A 04/20/2023    Procedure: COLONOSCOPY;  Surgeon: Sabino Stephenson MD;  Location: [facility];  Service: Endoscopy;  Laterality: N/A;    CORONARY STENT PLACEMENT  10/2023    x 3    ESOPHAGOGASTRODUODENOSCOPY N/A 06/20/2018    Procedure: EGD (ESOPHAGOGASTRODUODENOSCOPY);  Surgeon: Sabino Stephenson MD;  Location: [facility];  Service: Endoscopy;  Laterality: N/A;    ESOPHAGOGASTRODUODENOSCOPY N/A 03/31/2023    Procedure: EGD (ESOPHAGOGASTRODUODENOSCOPY);  Surgeon: Sabino Stephenson MD;  Location: [facility];  Service: Endoscopy;  Laterality: N/A;    ESOPHAGOGASTRODUODENOSCOPY N/A 12/11/2023    Procedure: EGD (ESOPHAGOGASTRODUODENOSCOPY);  Surgeon: Pretty Salgado MD;  Location: [facility];  Service: Endoscopy;  Laterality: N/A;    HYSTERECTOMY  1976    partial    INJECTION OF ANESTHETIC AGENT AROUND MEDIAL BRANCH NERVES INNERVATING LUMBAR FACET JOINT Right 05/10/2023    Procedure: Block-nerve-Lateral-branch-lumbar;  Surgeon: Agustin Robles MD;  Location: [facility] OR;   Service: Pain Management;  Laterality: Right;  L5 and s1,s2 LBB    INJECTION OF ANESTHETIC AGENT AROUND MEDIAL BRANCH NERVES INNERVATING LUMBAR FACET JOINT Right 05/30/2023    Procedure: Block-nerve-medial branch-lumbar;  Surgeon: Agustin Robles MD;  Location: [facility] OR;  Service: Pain Management;  Laterality: Right;  L5, s1 ,s2 LBB #2    INJECTION OF ANESTHETIC AGENT AROUND NERVE Right 10/31/2023    Procedure: INTERCOSTAL NERVE BLOCK;  Surgeon: Washington Shankar MD;  Location: [facility] OR;  Service: Cardiothoracic;  Laterality: Right;    INJECTION, SACROILIAC JOINT Right 01/26/2023    Procedure: INJECTION,SACROILIAC JOINT;  Surgeon: Agustin Robles MD;  Location: [facility] OR;  Service: Pain Management;  Laterality: Right;    INSERTION OF TUNNELED CENTRAL VENOUS CATHETER (CVC) WITH SUBCUTANEOUS PORT Right 10/19/2020    Procedure: INSERTION, PORT-A-CATH;  Surgeon: Misti Mcfarland MD;  Location: [facility] OR;  Service: General;  Laterality: Right;    INTRAMEDULLARY RODDING OF TROCHANTER OF FEMUR Right 11/28/2021    Procedure: INSERTION, INTRAMEDULLARY LUC, FEMUR, TROCHANTER/RIGHT TFN DR FAJARDO NOTIFIED REP;  Surgeon: Kp Fajardo MD;  Location: [facility] OR;  Service: Orthopedics;  Laterality: Right;  SKIP    ROBOT-ASSISTED LAPAROSCOPIC LYMPHADENECTOMY USING DA NELLA XI N/A 09/21/2020    Procedure: XI ROBOTIC LYMPHADENECTOMY-pelvic and kell-aortic;  Surgeon: Altagracia Ray MD;  Location: [facility] OR;  Service: OB/GYN;  Laterality: N/A;    ROBOT-ASSISTED LAPAROSCOPIC OMENTECTOMY USING DA NELLA XI N/A 09/21/2020    Procedure: XI ROBOTIC OMENTECTOMY;  Surgeon: Altagracia Ray MD;  Location: [facility] OR;  Service: OB/GYN;  Laterality: N/A;    ROBOT-ASSISTED LAPAROSCOPIC SALPINGO-OOPHORECTOMY USING DA NELLA XI Bilateral 09/21/2020    Procedure: XI ROBOTIC SALPINGO-OOPHORECTOMY;  Surgeon: Altagracia Ray MD;  Location: [facility] OR;  Service: OB/GYN;  Laterality: Bilateral;    THORACOSCOPIC BIOPSY OF PLEURA Right 10/31/2023    Procedure:  VATS, WITH PLEURA BIOPSY;  Surgeon: Washington Shankar MD;  Location: [facility] OR;  Service: Cardiothoracic;  Laterality: Right;  FROZEN SECTION   **KRISTEN-ASSISDT**    THORACOTOMY Right 10/31/2023    Procedure: THORACOTOMY;  Surgeon: Washington Shankar MD;  Location: [facility] OR;  Service: Cardiothoracic;  Laterality: Right;    UPPER GASTROINTESTINAL ENDOSCOPY       Family History   Problem Relation Name Age of Onset    Cancer Mother  57        lung    Cancer Father  56        oral    Skin cancer Sister      Skin cancer Brother      Melanoma Brother      Skin cancer Brother      Breast cancer Maternal Grandmother      Breast cancer Paternal Grandmother      Colon polyps Daughter      Psoriasis Neg Hx      Lupus Neg Hx      Eczema Neg Hx      Colon cancer Neg Hx      Crohn's disease Neg Hx      Esophageal cancer Neg Hx      Stomach cancer Neg Hx      Ulcerative colitis Neg Hx         SUBJECTIVE     Review of systems: 10 system review unremarkable.  As in HPI     OBJECTIVE   Temp:  [97.8 °F (36.6 °C)-99.9 °F (37.7 °C)] 98.2 °F (36.8 °C)  Pulse:  [80-99] 84  Resp:  [18-24] 18  SpO2:  [92 %-100 %] 92 %  BP: (141-178)/(65-79) 162/74  Temp:  [97.8 °F (36.6 °C)-99.9 °F (37.7 °C)]   Temp: 98.2 °F (36.8 °C) (11/02/24 1114)  Pulse: 84 (11/02/24 1114)  Resp: 18 (11/02/24 0823)  BP: (!) 162/74 (11/02/24 1114)  SpO2: (!) 92 % (11/02/24 1114)    Intake/Output Summary (Last 24 hours) at 11/2/2024 1313  Last data filed at 11/2/2024 0534  Gross per 24 hour   Intake 360 ml   Output 440 ml   Net -80 ml       Physical Exam  General:  Thin elderly woman lying in bed.  She is in moderate respiratory distress.  On FiO2 12 liters/minutes   HEENT: No oral thrush, no cervical adenopathy   Respiratory:  Reduced breath sounds.  Clear to auscultation anteriorly   CVS: S1 and 2 heard   Abdomen:  Full, soft, nontender, no palpable organomegaly   Skin:  No rash appreciated   CNS: No focal deficits   Musculoskeletal: No joint or bony abnormalities  "appreciated    VAD:  Right chest MediPort  ISOLATION:  None    Wounds:  None    Significant Labs: All pertinent labs within the past 24 hours have been reviewed.    CBC LAST 7  Recent Labs   Lab 11/01/24  0950 11/02/24  0433   WBC 16.89* 12.30   RBC 2.76* 2.69*   HGB 8.7* 8.5*   HCT 28.5* 27.9*   * 104*   MCH 31.5* 31.6*   MCHC 30.5* 30.5*   RDW 18.5* 18.6*    233   MPV 8.9* 9.4   GRAN 96.8*  16.4* 94.6*  11.6*   LYMPH 0.9*  0.2* 3.5*  0.4*   MONO 0.4*  0.1* 0.8*  0.1*   BASO 0.01 0.01   NRBC 0 0       CHEMISTRY LAST 7  Recent Labs   Lab 11/01/24  0950 11/02/24  0433    138   K 3.4* 5.0   CL 99 98   CO2 29 31*   ANIONGAP 10 9   BUN 24* 22   CREATININE 1.0 1.0   * 140*   CALCIUM 8.4* 8.8   MG 1.8 1.9   ALBUMIN 3.0* 2.9*   PROT 6.7 6.7   ALKPHOS 46* 44*   ALT 9* 8*   AST 18 16   BILITOT 0.6 0.5       Estimated Creatinine Clearance: 33.3 mL/min (based on SCr of 1 mg/dL).    INFLAMMATORY/PROCAL  LAST 7  No results for input(s): "PROCAL", "ESR", "CRP" in the last 168 hours.  No results found for: "ESR"  CRP   Date Value Ref Range Status   12/11/2023 39.3 (H) 0.0 - 8.2 mg/L Final   12/10/2023 65.7 (H) 0.0 - 8.2 mg/L Final   12/04/2023 378.6 (H) 0.0 - 8.2 mg/L Final       PRIOR  MICROBIOLOGY:  Reviewed    Susceptibility data from last 90 days.  Collected Specimen Info Organism Amp/Sulbactam Ampicillin Cefazolin Cefepime Ceftriaxone Ciprofloxacin Ertapenem Gentamicin Levofloxacin Meropenem Nitrofurantoin PIPERACILLIN/TAZO SUSCEPTIBILITY Tobramycin Trimeth/Sulfa   10/18/24 Urine, Clean Catch Escherichia coli  R  R  S  S  S  S  S  S  S  S  S  S  S  R   10/07/24 Blood from Peripheral, Hand, Right No growth after 5 days.                 10/07/24 Blood from Peripheral, Hand, Left No growth after 5 days.                     LAST 7 DAYS MICROBIOLOGY   Microbiology Results (last 7 days)       Procedure Component Value Units Date/Time    Blood culture x two cultures. Draw prior to antibiotics. " [[id number]] Collected: 11/01/24 0945    Order Status: Completed Specimen: Blood from Peripheral, Antecubital, Left Updated: 11/02/24 1032     Blood Culture, Routine No Growth to date      No Growth to date    Narrative:      Aerobic and anaerobic    Blood culture x two cultures. Draw prior to antibiotics. [[id number]] Collected: 11/01/24 0951    Order Status: Completed Specimen: Blood from Peripheral, Antecubital, Right Updated: 11/02/24 1032     Blood Culture, Routine No Growth to date      No Growth to date    Narrative:      Aerobic and anaerobic            CURRENT/PREVIOUS VISIT EKG  Results for orders placed or performed during the hospital encounter of 11/01/24   EKG 12-lead    Collection Time: 11/01/24  9:53 AM   Result Value Ref Range    QRS Duration 56 ms    [facility] QTC Calculation 482 ms    Narrative    Test Reason : R06.02,    Vent. Rate : 097 BPM     Atrial Rate : 097 BPM     P-R Int : 156 ms          QRS Dur : 056 ms      QT Int : 380 ms       P-R-T Axes : 044 -06 100 degrees     QTc Int : 482 ms    Normal sinus rhythm  Low voltage QRS  Septal infarct (cited on or before 13-OCT-2023)  Abnormal ECG  When compared with ECG of 25-OCT-2024 10:48,  ST now depressed in Lateral leads    Referred By: [email or web address]   SELF           Confirmed By:         Significant Imaging: I have reviewed all relevant and available imaging results/findings within the past 24 hours.    I spent a total of 65 minutes on the day of the visit.This includes face to face time and non-face to face time preparing to see the patient (eg, review of tests), obtaining and/or reviewing separately obtained history, documenting clinical information in the electronic or other health record, independently interpreting results and communicating results to the patient/family/caregiver, or care coordinator.    Byron Galvez MD  Date of Service: 11/02/2024      This note was created using [employer] voice recognition software that occasionally  misinterpreted phrases or words.

## 2024-11-02 NOTE — CONSULTS
Pulmonary/Critical Care Consult      PATIENT NAME: Alexa Otero  MRN: [id number]  TODAY'S DATE: 2024  11:55 AM  ADMIT DATE: 2024  AGE: 84 y.o. 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    She reports progressive fatigue weakness prior to admission to the hospital.  She reports that she slid out of her bed and that is the reason she came to the hospital.  She reports that she has not had any fever cough night sweats, but she has some worsening shortness of breath.    Review of Systems   Constitutional:  Positive for appetite change and fatigue. Negative for chills, fever and unexpected weight change.   HENT:  Negative for nosebleeds, postnasal drip, trouble swallowing and voice change.    Eyes:  Negative for visual disturbance.   Respiratory:  Positive for shortness of breath. Negative for cough and wheezing.    Cardiovascular:  Negative for chest pain and leg swelling.   Gastrointestinal:  Negative for diarrhea, nausea and vomiting.   Endocrine: Negative for cold intolerance and heat intolerance.   Genitourinary:  Negative for dysuria, flank pain and frequency.   Musculoskeletal:  Negative for neck pain and neck stiffness.   Allergic/Immunologic: Negative for environmental allergies and immunocompromised state.   Neurological:  Negative for syncope, speech difficulty and weakness.   Hematological:  Negative for adenopathy.   Psychiatric/Behavioral:  Negative for confusion.            ALLERGIES  Review of patient's allergies indicates:  No Known Allergies    INPATIENT SCHEDULED MEDICATIONS   amiodarone  200 mg Oral BID     apixaban  2.5 mg Oral BID    ceFEPime IV (PEDS and ADULTS)  1 g Intravenous Q12H    doxycycline  100 mg Oral Q12H    folic acid  1 mg Oral Daily    gabapentin  100 mg Oral BID    magnesium oxide  400 mg Oral Daily    metoprolol succinate  25 mg Oral Daily    mupirocin   Nasal BID    pantoprazole  40 mg Oral Daily    sucralfate  1 g Oral QID         MEDICAL AND SURGICAL HISTORY  Past Medical History:   Diagnosis Date    Arthritis     Cardiac arrest 10/2023    Cataract     Chronic obstructive pulmonary disease, unspecified COPD type 03/20/2024    Colon polyp     Coronary artery disease 03/20/2024    Diabetes mellitus type II 2012    Encounter for blood transfusion     Extra-ovarian endometrioid adenocarcinoma 2020    GERD (gastroesophageal reflux disease)     History of chronic cough     clear productive    Hyperlipidemia     Hypertension     Macular degeneration     Ovarian cancer     PONV (postoperative nausea and vomiting)     Squamous cell carcinoma 1980's    precancer of cervix     Past Surgical History:   Procedure Laterality Date    AUGMENTATION OF BREAST      BRONCHOSCOPY N/A 12/12/2023    Procedure: BRONCHOSCOPY;  Surgeon: Neva Christian MD;  Location: [facility];  Service: ENT;  Laterality: N/A;    BRONCHOSCOPY WITH FLUOROSCOPY Right 05/11/2023    Procedure: BRONCHOSCOPY, WITH FLUOROSCOPY;  Surgeon: Neva Christian MD;  Location: [facility];  Service: Pulmonary;  Laterality: Right;    BRONCHOSCOPY WITH FLUOROSCOPY N/A 12/15/2023    Procedure: BRONCHOSCOPY, WITH FLUOROSCOPY;  Surgeon: Neva Christian MD;  Location: [facility];  Service: Pulmonary;  Laterality: N/A;    CATARACT EXTRACTION BILATERAL W/ ANTERIOR VITRECTOMY Bilateral     CHOLECYSTECTOMY      COLONOSCOPY      COLONOSCOPY N/A 04/20/2023    Procedure: COLONOSCOPY;  Surgeon: Sabino Stephenson MD;  Location: [facility];  Service: Endoscopy;  Laterality: N/A;    CORONARY STENT PLACEMENT  10/2023    x 3    ESOPHAGOGASTRODUODENOSCOPY N/A  06/20/2018    Procedure: EGD (ESOPHAGOGASTRODUODENOSCOPY);  Surgeon: Sabino Stephenson MD;  Location: [facility] END[facility];  Service: Endoscopy;  Laterality: N/A;    ESOPHAGOGASTRODUODENOSCOPY N/A 03/31/2023    Procedure: EGD (ESOPHAGOGASTRODUODENOSCOPY);  Surgeon: Sabino Stephenson MD;  Location: [facility] ENDO;  Service: Endoscopy;  Laterality: N/A;    ESOPHAGOGASTRODUODENOSCOPY N/A 12/11/2023    Procedure: EGD (ESOPHAGOGASTRODUODENOSCOPY);  Surgeon: Pretty Salgado MD;  Location: [facility];  Service: Endoscopy;  Laterality: N/A;    HYSTERECTOMY  1976    partial    INJECTION OF ANESTHETIC AGENT AROUND MEDIAL BRANCH NERVES INNERVATING LUMBAR FACET JOINT Right 05/10/2023    Procedure: Block-nerve-Lateral-branch-lumbar;  Surgeon: Agustin Robles MD;  Location: [facility];  Service: Pain Management;  Laterality: Right;  L5 and s1,s2 LBB    INJECTION OF ANESTHETIC AGENT AROUND MEDIAL BRANCH NERVES INNERVATING LUMBAR FACET JOINT Right 05/30/2023    Procedure: Block-nerve-medial branch-lumbar;  Surgeon: Agustin Robles MD;  Location: [facility] OR;  Service: Pain Management;  Laterality: Right;  L5, s1 ,s2 LBB #2    INJECTION OF ANESTHETIC AGENT AROUND NERVE Right 10/31/2023    Procedure: INTERCOSTAL NERVE BLOCK;  Surgeon: Washington Shankar MD;  Location: [facility];  Service: Cardiothoracic;  Laterality: Right;    INJECTION, SACROILIAC JOINT Right 01/26/2023    Procedure: INJECTION,SACROILIAC JOINT;  Surgeon: Agustin Robles MD;  Location: [facility] OR;  Service: Pain Management;  Laterality: Right;    INSERTION OF TUNNELED CENTRAL VENOUS CATHETER (CVC) WITH SUBCUTANEOUS PORT Right 10/19/2020    Procedure: INSERTION, PORT-A-CATH;  Surgeon: Misti Mcfarland MD;  Location: [facility] OR;  Service: General;  Laterality: Right;    INTRAMEDULLARY RODDING OF TROCHANTER OF FEMUR Right 11/28/2021    Procedure: INSERTION, INTRAMEDULLARY LUC, FEMUR, TROCHANTER/RIGHT TFN DR FAJARDO NOTIFIED REP;  Surgeon: Kp Fajardo MD;  Location: [facility];  Service: Orthopedics;   Laterality: Right;  SKIP    ROBOT-ASSISTED LAPAROSCOPIC LYMPHADENECTOMY USING DA NELLA XI N/A 2020    Procedure: XI ROBOTIC LYMPHADENECTOMY-pelvic and kell-aortic;  Surgeon: Altagracia Ray MD;  Location: [facility] OR;  Service: OB/GYN;  Laterality: N/A;    ROBOT-ASSISTED LAPAROSCOPIC OMENTECTOMY USING DA NELLA XI N/A 2020    Procedure: XI ROBOTIC OMENTECTOMY;  Surgeon: Altagracia Ray MD;  Location: [facility] OR;  Service: OB/GYN;  Laterality: N/A;    ROBOT-ASSISTED LAPAROSCOPIC SALPINGO-OOPHORECTOMY USING DA NELLA XI Bilateral 2020    Procedure: XI ROBOTIC SALPINGO-OOPHORECTOMY;  Surgeon: Altagracia Ray MD;  Location: [facility] OR;  Service: OB/GYN;  Laterality: Bilateral;    THORACOSCOPIC BIOPSY OF PLEURA Right 10/31/2023    Procedure: VATS, WITH PLEURA BIOPSY;  Surgeon: Washington Shankar MD;  Location: [facility] OR;  Service: Cardiothoracic;  Laterality: Right;  FROZEN SECTION   **KRISTEN-ASSISDT**    THORACOTOMY Right 10/31/2023    Procedure: THORACOTOMY;  Surgeon: Washington Shankar MD;  Location: [facility] OR;  Service: Cardiothoracic;  Laterality: Right;    UPPER GASTROINTESTINAL ENDOSCOPY         ALCOHOL, TOBACCO AND DRUG USE  Social History     Tobacco Use   Smoking Status Former    Current packs/day: 0.00    Average packs/day: 1 pack/day for 20.0 years (20.0 ttl pk-yrs)    Types: Cigarettes    Start date:     Quit date:     Years since quittin.8   Smokeless Tobacco Never   Tobacco Comments    quit 40 yrs ago     Social History     Substance and Sexual Activity   Alcohol Use Yes    Alcohol/week: 1.0 standard drink of alcohol    Types: 1 Glasses of wine per week    Comment: occasional     Social History     Substance and Sexual Activity   Drug Use No       FAMILY HISTORY  Family History   Problem Relation Name Age of Onset    Cancer Mother  57        lung    Cancer Father  56        oral    Skin cancer Sister      Skin cancer Brother      Melanoma Brother      Skin cancer Brother       Breast cancer Maternal Grandmother      Breast cancer Paternal Grandmother      Colon polyps Daughter      Psoriasis Neg Hx      Lupus Neg Hx      Eczema Neg Hx      Colon cancer Neg Hx      Crohn's disease Neg Hx      Esophageal cancer Neg Hx      Stomach cancer Neg Hx      Ulcerative colitis Neg Hx         VITAL SIGNS (MOST RECENT)  Temp: 98.2 °F (36.8 °C) (11/02/24 1114)  Pulse: 84 (11/02/24 1114)  Resp: 18 (11/02/24 0823)  BP: (!) 162/74 (11/02/24 1114)  SpO2: (!) 92 % (11/02/24 1114)    INTAKE AND OUTPUT (LAST 24 HOURS):  Intake/Output Summary (Last 24 hours) at 11/2/2024 1155  Last data filed at 11/2/2024 0534  Gross per 24 hour   Intake 360 ml   Output 440 ml   Net -80 ml       WEIGHT  Wt Readings from Last 1 Encounters:   11/01/24 49.5 kg (109 lb 2 oz)       Physical Exam  Vitals reviewed.   Constitutional:       General: She is not in acute distress.     Appearance: She is well-developed. She is not diaphoretic.   HENT:      Head: Normocephalic and atraumatic.      Mouth/Throat:      Pharynx: No oropharyngeal exudate or posterior oropharyngeal erythema.   Eyes:      General: No scleral icterus.     Pupils: Pupils are equal, round, and reactive to light.   Neck:      Vascular: No JVD.   Cardiovascular:      Rate and Rhythm: Normal rate and regular rhythm.      Heart sounds: Normal heart sounds. No murmur heard.  Pulmonary:      Effort: Pulmonary effort is normal. No respiratory distress.      Breath sounds: Rales present. No wheezing.   Abdominal:      General: Bowel sounds are normal. There is distension.      Palpations: Abdomen is soft.      Tenderness: There is no abdominal tenderness.   Musculoskeletal:         General: No swelling.      Cervical back: Normal range of motion and neck supple. No rigidity.   Skin:     General: Skin is warm and dry.      Capillary Refill: Capillary refill takes less than 2 seconds.      Coloration: Skin is not pale.      Findings: No rash.   Neurological:      General: No  "focal deficit present.      Mental Status: She is alert and oriented to person, place, and time.      Cranial Nerves: No cranial nerve deficit.      Motor: No weakness or abnormal muscle tone.           ACUTE PHASE REACTANT (LAST 24 HOURS)  No results for input(s): "FERRITIN", "CRP", "LDH", "DDIMER" in the last 24 hours.    CBC LAST (LAST 24 HOURS)  Recent Labs   Lab 11/02/24  0433   WBC 12.30   RBC 2.69*   HGB 8.5*   HCT 27.9*   *   MCH 31.6*   MCHC 30.5*   RDW 18.6*      MPV 9.4   GRAN 94.6*  11.6*   LYMPH 3.5*  0.4*   MONO 0.8*  0.1*   BASO 0.01   NRBC 0       CHEMISTRY LAST (LAST 24 HOURS)  Recent Labs   Lab 11/02/24  0433      K 5.0   CL 98   CO2 31*   ANIONGAP 9   BUN 22   CREATININE 1.0   *   CALCIUM 8.8   MG 1.9   ALBUMIN 2.9*   PROT 6.7   ALKPHOS 44*   ALT 8*   AST 16   BILITOT 0.5       COAGULATION LAST (LAST 24 HOURS)  No results for input(s): "LABPT", "INR", "APTT" in the last 24 hours.    CARDIAC PROFILE (LAST 24 HOURS)  Recent Labs   Lab 11/01/24  0950   *   TROPONINIHS 46.9*       LAST 7 DAYS MICROBIOLOGY   Microbiology Results (last 7 days)       Procedure Component Value Units Date/Time    Blood culture x two cultures. Draw prior to antibiotics. [[id number]] Collected: 11/01/24 0945    Order Status: Completed Specimen: Blood from Peripheral, Antecubital, Left Updated: 11/02/24 1032     Blood Culture, Routine No Growth to date      No Growth to date    Narrative:      Aerobic and anaerobic    Blood culture x two cultures. Draw prior to antibiotics. [[id number]] Collected: 11/01/24 0951    Order Status: Completed Specimen: Blood from Peripheral, Antecubital, Right Updated: 11/02/24 1032     Blood Culture, Routine No Growth to date      No Growth to date    Narrative:      Aerobic and anaerobic            MOST RECENT IMAGING  X-Ray KUB  Narrative: EXAMINATION:  XR KUB    CLINICAL HISTORY:  abd pain/N/V;    FINDINGS:  Two abdominal radiographs at 11:05 hours compared " to prior exams including CT 10/17/2024 show a nonobstructive bowel gas pattern.  There is scattered air in the colon, with moderate volume of stool in the distal colon and rectum.  No evidence of intraperitoneal free air.    Rim calcification in the right upper quadrant is unchanged, with no suspicious calcifications overlying the kidneys or ureters.  There are multiple calcified pelvic phleboliths, with right upper quadrant cholecystectomy clips.  There is rightward convex lumbar spinal curvature, with intervertebral disc space narrowing and facet arthropathy in the spine.    The bones are diffusely osteopenic.  Right hip hardware is incompletely visualized.  There are extensive interstitial and airspace opacities in the visualized lower lungs.  Impression: Nonobstructive bowel gas pattern.    Electronically signed by: Davide Jeffrey  Date:    11/02/2024  Time:    11:18      CURRENT VISIT EKG  Results for orders placed or performed during the hospital encounter of 11/01/24   EKG 12-lead   Result Value Ref Range    QRS Duration 56 ms    [facility] QTC Calculation 482 ms    Narrative    Test Reason : R06.02,    Vent. Rate : 097 BPM     Atrial Rate : 097 BPM     P-R Int : 156 ms          QRS Dur : 056 ms      QT Int : 380 ms       P-R-T Axes : 044 -06 100 degrees     QTc Int : 482 ms    Normal sinus rhythm  Low voltage QRS  Septal infarct (cited on or before 13-OCT-2023)  Abnormal ECG  When compared with ECG of 25-OCT-2024 10:48,  ST now depressed in Lateral leads    Referred By: [email or web address]   SELF           Confirmed By:        ECHOCARDIOGRAM RESULTS  Results for orders placed during the hospital encounter of 10/07/24    Echo    Interpretation Summary    Left Ventricle: The left ventricle is normal in size. Mildly increased wall thickness. There is mild concentric hypertrophy. Moderate global hypokinesis present. There is mildly reduced systolic function with a visually estimated ejection fraction of 40 - 45%. There is normal  "diastolic function.    Right Ventricle: Normal right ventricular cavity size. Wall thickness is normal. Systolic function is normal.    Left Atrium: Left atrium is moderately dilated.    Mitral Valve: There is bileaflet sclerosis. There is moderate mitral annular calcification present. There is moderate stenosis. The mean pressure gradient across the mitral valve is 8 mmHg at a heart rate of  bpm. There is mild regurgitation with a centrally directed jet.    Pulmonary Artery: The estimated pulmonary artery systolic pressure is 29 mmHg.    IVC/SVC: Normal venous pressure at 3 mmHg.        VENTILATOR INFORMATION  Oxygen Concentration (%):  [100] 100           LAST ARTERIAL BLOOD GAS  ABG  No results for input(s): "PH", "PO2", "PCO2", "HCO3", "BE" in the last 168 hours.                ASSESSMENT:   Hypoxemic respiratory failure  Abnormal CT  History of lung adenocarcinoma on Alimta    Ms. Otero is a 84-year-old woman who presents with abnormal CT imaging.     She has clear underlying lung disease related to likely hx of smoking and there is evidence of significant intralobular emphysema.     Additionally on review of her imaging, she clearly has evidence of right-sided lung cancer that has continued to be present on her CT scans and appears to be progressive.     Many differentials to consider- progression of malignancy, pneumonia and or atypical infections or potentially pneumonitis - there are some case reposrt related to Alimta.     Plan to observe while on antibiotics therapy. If no significant improvement tentative plan is bronchoscopy on Monday.     PLAN:   - Keep NPO at midnight on Sunday for tentative bronch next Mon  - Continue antibiotics as you are   - Ok to continue eliquis this will not affect bronchoscopy  - Continue duo nebs and will add prednisone 40mg   - Will try bipap at night she has mild elevation of bicarb which could suggest some chronic hypercapnia.     Reid Garrett MD  Date of Service: " 11/02/2024  11:55 AM

## 2024-11-02 NOTE — ASSESSMENT & PLAN NOTE
"Patient was started on cefepime, I will add atypical coverage as well as vanc.  Consult Pulmonary, and Infectious Disease.    Antibiotics (From admission, onward)    Start     Stop Route Frequency Ordered    11/02/24 0200  ceFEPIme injection 1 g         -- IV Every 12 hours (non-standard times) 11/01/24 2114    11/01/24 1422  vancomycin - pharmacy to dose  (vancomycin IVPB (PEDS and ADULTS))        Placed in "And" Linked Group    -- IV pharmacy to manage frequency 11/01/24 1322    11/01/24 1330  doxycycline capsule 100 mg         -- Oral Every 12 hours 11/01/24 1322    11/01/24 1300  mupirocin 2 % ointment         11/06/24 0859 Nasl 2 times daily 11/01/24 1200          Microbiology Results (last 7 days)     Procedure Component Value Units Date/Time    Respiratory Infection Panel (PCR), Nasopharyngeal [[id number]] Collected: 11/02/24 1329    Order Status: Completed Specimen: Nasopharyngeal Swab Updated: 11/02/24 1348     Respiratory Infection Panel Source NP swab    Narrative:      Respiratory Infection Panel source->NP Swab    MRSA Screen by PCR [[id number]] Collected: 11/02/24 1329    Order Status: Sent Specimen: Nasal Swab Updated: 11/02/24 1347    Culture, Respiratory with Gram Stain [[id number]]     Order Status: Sent Specimen: Respiratory from Sputum, Expectorated     Blood culture x two cultures. Draw prior to antibiotics. [[id number]] Collected: 11/01/24 0945    Order Status: Completed Specimen: Blood from Peripheral, Antecubital, Left Updated: 11/02/24 1032     Blood Culture, Routine No Growth to date      No Growth to date    Narrative:      Aerobic and anaerobic    Blood culture x two cultures. Draw prior to antibiotics. [[id number]] Collected: 11/01/24 0951    Order Status: Completed Specimen: Blood from Peripheral, Antecubital, Right Updated: 11/02/24 1032     Blood Culture, Routine No Growth to date      No Growth to date    Narrative:      Aerobic and anaerobic        "

## 2024-11-02 NOTE — ASSESSMENT & PLAN NOTE
Patient has paroxysmal (<7 days) atrial fibrillation. Patient is currently in sinus rhythm. [id number] Score: 5. The patients heart rate in the last 24 hours is as follows:  Pulse  Min: 80  Max: 99     Antiarrhythmics  Amiodarone and metoprolol    Anticoagulants  Eliquis    Plan  - Replete lytes with a goal of K>4, Mg >2  - Patient is anticoagulated, see medications listed above.  - Patient's afib is currently controlled

## 2024-11-02 NOTE — PLAN OF CARE
Problem: Adult Inpatient Plan of Care  Goal: Plan of Care Review  Outcome: Progressing  Goal: Patient-Specific Goal (Individualized)  Outcome: Progressing  Goal: Absence of Hospital-Acquired Illness or Injury  Outcome: Progressing  Goal: Optimal Comfort and Wellbeing  Outcome: Progressing  Goal: Readiness for Transition of Care  Outcome: Progressing     Problem: Sepsis/Septic Shock  Goal: Optimal Coping  Outcome: Progressing  Goal: Absence of Bleeding  Outcome: Progressing  Goal: Blood Glucose Level Within Targeted Range  Outcome: Progressing  Goal: Absence of Infection Signs and Symptoms  Outcome: Progressing  Goal: Optimal Nutrition Intake  Outcome: Progressing     Problem: Pneumonia  Goal: Fluid Balance  Outcome: Progressing  Goal: Resolution of Infection Signs and Symptoms  Outcome: Progressing  Goal: Effective Oxygenation and Ventilation  Outcome: Progressing     Problem: Fall Injury Risk  Goal: Absence of Fall and Fall-Related Injury  Outcome: Progressing     Problem: Skin Injury Risk Increased  Goal: Skin Health and Integrity  Outcome: Progressing

## 2024-11-02 NOTE — ASSESSMENT & PLAN NOTE
Likely secondary to her bilateral extensive pneumonia.  On 4L NC at home  Currently on high flow nasal cannula  Pulm consulted   CTA chest ruled out PE.  Echo done last month showed normal systolic function.   Pneumonia vs. Pneumonitis? - pulm added prednisone for possible Pemetrexed induced pneumonitis

## 2024-11-02 NOTE — PROGRESS NOTES
Pharmacokinetic Assessment Follow Up: IV Vancomycin    Vancomycin serum concentration assessment(s):    The random level was drawn correctly and can be used to guide therapy at this time. The measurement is below the desired definitive target range of 10 to 15 mcg/mL.    Vancomycin Regimen Plan:    Change regimen to Vancomycin 750 mg IV every 24 hours with next serum trough level due 11/4/24 at 16:00.    Drug levels (last 3 results):  Recent Labs   Lab Result Units 11/02/24  1335   Vancomycin, Random ug/mL 6.0       Pharmacy will continue to follow and monitor vancomycin.    Please contact pharmacy at extension [phone] for questions regarding this assessment.    Thank you for the consult,   Nisreen Floyd       Patient brief summary:  Alexa Otero is a 84 y.o. female initiated on antimicrobial therapy with IV Vancomycin for treatment of lower respiratory infection    The patient's current regimen is Vancomycin Intermittent dosing.    Drug Allergies:   Review of patient's allergies indicates:  No Known Allergies    Actual Body Weight:   49.5 kg    Renal Function:   Estimated Creatinine Clearance: 33.3 mL/min (based on SCr of 1 mg/dL).,     Dialysis Method (if applicable):  N/A    CBC (last 72 hours):  Recent Labs   Lab Result Units 11/01/24  0950 11/02/24  0433   WBC K/uL 16.89* 12.30   Hemoglobin g/dL 8.7* 8.5*   Hematocrit % 28.5* 27.9*   Platelets K/uL 253 233   Gran % % 96.8* 94.6*   Lymph % % 0.9* 3.5*   Mono % % 0.4* 0.8*   Eosinophil % % 0.0 0.2   Basophil % % 0.1 0.1   Differential Method  Automated Automated       Metabolic Panel (last 72 hours):  Recent Labs   Lab Result Units 11/01/24  0950 11/01/24  2239 11/02/24  0433   Sodium mmol/L 138  --  138   Potassium mmol/L 3.4*  --  5.0   Chloride mmol/L 99  --  98   CO2 mmol/L 29  --  31*   Glucose mg/dL 191*  --  140*   Glucose, UA   --  Trace*  --    BUN mg/dL 24*  --  22   Creatinine mg/dL 1.0  --  1.0   Albumin g/dL 3.0*  --  2.9*   Total Bilirubin mg/dL 0.6   --  0.5   Alkaline Phosphatase U/L 46*  --  44*   AST U/L 18  --  16   ALT U/L 9*  --  8*   Magnesium mg/dL 1.8  --  1.9       Vancomycin Administrations:  vancomycin given in the last 96 hours                     vancomycin 750 mg in dextrose 5 % 250 mL IVPB (ready to mix) (mg) 750 mg New Bag 11/02/24 1703    vancomycin in dextrose 5 % 1 gram/250 mL IVPB 1,000 mg (mg) 1,000 mg New Bag 11/01/24 1402                    Microbiologic Results:  Microbiology Results (last 7 days)       Procedure Component Value Units Date/Time    MRSA Screen by PCR [[id number]] Collected: 11/02/24 1329    Order Status: Completed Specimen: Nasal Swab Updated: 11/02/24 1532     MRSA SCREEN BY PCR Negative    Respiratory Infection Panel (PCR), Nasopharyngeal [[id number]] Collected: 11/02/24 1329    Order Status: Completed Specimen: Nasopharyngeal Swab Updated: 11/02/24 1509     Respiratory Infection Panel Source NP swab     Adenovirus Not Detected     Coronavirus 229E, Common Cold Virus Not Detected     Coronavirus HKU1, Common Cold Virus Not Detected     Coronavirus NL63, Common Cold Virus Not Detected     Coronavirus OC43, Common Cold Virus Not Detected     Comment: Coronavirus strains 229E, HKU1, NL63, and OC43 can cause the common   cold   and are not associated with the respiratory disease outbreak caused   by  the COVID-19 (SARS-CoV-2 novel Coronavirus) strain.           SARS-CoV2 (COVID-19) Qualitative PCR Not Detected     Human Metapneumovirus Not Detected     Human Rhinovirus/Enterovirus Not Detected     Influenza A (subtypes H1, H1-2009,H3) Not Detected     Influenza B Not Detected     Parainfluenza Virus 1 Not Detected     Parainfluenza Virus 2 Not Detected     Parainfluenza Virus 3 Not Detected     Parainfluenza Virus 4 Not Detected     Respiratory Syncytial Virus Not Detected     Bordetella Parapertussis ([id number]) Not Detected     Bordetella pertussis (ptxP) Not Detected     Chlamydia pneumoniae Not Detected     Mycoplasma  pneumoniae Not Detected     Comment: Respiratory Infection Panel testing performed by Multiplex PCR.       Narrative:      Respiratory Infection Panel source->NP Swab    Culture, Respiratory with Gram Stain [[id number]]     Order Status: Sent Specimen: Respiratory from Sputum, Expectorated     Blood culture x two cultures. Draw prior to antibiotics. [[id number]] Collected: 11/01/24 0945    Order Status: Completed Specimen: Blood from Peripheral, Antecubital, Left Updated: 11/02/24 1032     Blood Culture, Routine No Growth to date      No Growth to date    Narrative:      Aerobic and anaerobic    Blood culture x two cultures. Draw prior to antibiotics. [[id number]] Collected: 11/01/24 0951    Order Status: Completed Specimen: Blood from Peripheral, Antecubital, Right Updated: 11/02/24 1032     Blood Culture, Routine No Growth to date      No Growth to date    Narrative:      Aerobic and anaerobic

## 2024-11-02 NOTE — ASSESSMENT & PLAN NOTE
"Admitted to telemetry  This patient does have evidence of infective focus  My overall impression is sepsis.  Source: Respiratory  Antibiotics given-   Antibiotics (72h ago, onward)      Start     Stop Route Frequency Ordered    11/02/24 0200  ceFEPIme injection 1 g         -- IV Every 12 hours (non-standard times) 11/01/24 2114    11/01/24 1422  vancomycin - pharmacy to dose  (vancomycin IVPB (PEDS and ADULTS))        Placed in "And" Linked Group    -- IV pharmacy to manage frequency 11/01/24 1322    11/01/24 1330  doxycycline capsule 100 mg         -- Oral Every 12 hours 11/01/24 1322    11/01/24 1300  mupirocin 2 % ointment         11/06/24 0859 Nasl 2 times daily 11/01/24 1200          Latest lactate reviewed-  Recent Labs   Lab 11/01/24  0955 11/01/24  1442   LACTATE  --  0.7   POCLAC 2.17  --        - Organ dysfunction indicated by acute on chronic respiratory failure  - CTA chest showed finding of Extensive interstitial and airspace opacities throughout both lungs, having significantly worsened in the left lung compared to prior CT.  Multifocal pneumonia, drug reaction, and or developing ARDS could have this appearance.   - Patient was started on cefepime and vanc with doxycycline added by admitting  - Consult Pulmonary, and Infectious Disease.  - hold IV fluids given effusions seen on images.      "

## 2024-11-02 NOTE — HOSPITAL COURSE
Ms. Otero has been monitored closely during her hospitalization. She was admitted on 11/1/2024 with acute on chronic hypoxemic resp failure. CTA chest reveals extensive interstitial opacities c/w multifocal pna and/or ARDS. She requires high flow nasal cannula up to 15L now on 13L. Pulm and ID have been consulted. Pulm recommends broad spectrum abx - cefepime, doxy, and vanc initially. MRSA screen negative and vanc d/c'ed. She has stage IV lung ca and completed Carbo/Pemetrexed last year and is currently on pemetrexed (Alimta) maintenance with last dose 10/28. This can cause an interstitial pneumonitis and pulm has started prednisone. Plan for bronchoscopy on Monday if no improvement and recommended continuing eliquis. BiPAP qHS and naps. Duonebs Q6h. ID has ordered a respiratory infection panel that is negative. She's had abdominal bloating and discomfort for some time and had an outpt CT abd/pelvis with gastric wall thickening that is is nonspecific could be related to gastritis. She has a history of peptic ulcers and PCP started protonix/carafate. KUB on 11/2 with nonobstructive bowel gas pattern and moderate stool burden and pt was treated with laxative suppository. She has chronic macrocytic anemia that appears at baseline. She had a leukocytosis with left shift on admission that has trended down from 16K-->12K-->8K. CRP 36, procal 2.9. Pt had a BM on 11/2, but abd remained distended. She was found to be retaining a significant amount of urine on bladder scan >900 and priest was placed with 1500mL out with resolution of abd distention. Pulm increased steriods on 11/3 to IV solumedrol, she's requiring BiPAP support and was given IV lasix. Macrocytic anemia at baseline on admission but trending down and she will receive 1 unit PRBCs 11/4. She has developed steroid induced hyperglycemia and insulin sliding titrated up to moderate dose for tighter glucose control. Priest d/c'ed on 11/5 and had to be reinserted on  11/06. PT/OT consulted and pt up to chair on 11/5. She desatted with movement and took some time and increased supplemental O2 to recover. Prior to moving to the chair, O2 was weaned down to 10L high flow. 11/6/2024 pulmonology change patient to p.o. prednisone as her oxygen demand had improved.  Patient was being maintained on approximately 5 L of oxygen.  11/11/24 patient was working with physical therapy when she became hypoxic requiring Vapotherm.  At this time she is currently requiring max dose of Vapotherm.  Bronchoscopy is not suggested by pulmonology due to patient's fragile condition.  Patient was switched back to IV Solu-Medrol q.6 I was per pulmonology.  Long discussion was had with the patient concerning inpatient hospice versus comfort measures, patient and spouse would like comfort measures but not until the patient saw her family.  She was seen and evaluated by pulmonology as well as palliative Medicine she requested to remain her present room until her family was present in the action was made to transfer to comfort care.  Requested less lab work, so daily labs were discontinued. Patient and family wish to transition to patient centered focused care. Hospice consulted. Patient will transition to inpatient hospice.

## 2024-11-02 NOTE — SUBJECTIVE & OBJECTIVE
Interval History: reports and episode of vomiting yesterday. KUB with nonobstructive bowel gas pattern, will treat constipation with laxative suppository and start bowel regimen.    Review of Systems   Constitutional:  Positive for fatigue.   Respiratory:  Positive for cough and shortness of breath.    Gastrointestinal:  Positive for abdominal distention, constipation, nausea and vomiting.   Neurological:  Positive for weakness.     Objective:     Vital Signs (Most Recent):  Temp: 98.2 °F (36.8 °C) (11/02/24 1114)  Pulse: 84 (11/02/24 1114)  Resp: 18 (11/02/24 0823)  BP: (!) 162/74 (11/02/24 1114)  SpO2: (!) 92 % (11/02/24 1114) Vital Signs (24h Range):  Temp:  [97.8 °F (36.6 °C)-99.9 °F (37.7 °C)] 98.2 °F (36.8 °C)  Pulse:  [80-99] 84  Resp:  [18-24] 18  SpO2:  [92 %-100 %] 92 %  BP: (148-178)/(67-79) 162/74     Weight: 49.5 kg (109 lb 2 oz)  Body mass index is 18.73 kg/m².    Intake/Output Summary (Last 24 hours) at 11/2/2024 1401  Last data filed at 11/2/2024 0534  Gross per 24 hour   Intake 360 ml   Output 440 ml   Net -80 ml         Physical Exam  Constitutional:       Comments: Chronically ill appearing   HENT:      Head: Normocephalic and atraumatic.      Nose: Nose normal.      Mouth/Throat:      Mouth: Mucous membranes are moist.      Pharynx: Oropharynx is clear.   Eyes:      Pupils: Pupils are equal, round, and reactive to light.   Cardiovascular:      Rate and Rhythm: Normal rate.   Pulmonary:      Effort: Pulmonary effort is normal.      Comments: Coarse and diminished throughout  Abdominal:      Comments: Abd is firm, mildly distended and nontender Bsx4 quadrants   Musculoskeletal:         General: Normal range of motion.      Cervical back: Normal range of motion and neck supple.   Skin:     General: Skin is warm.      Capillary Refill: Capillary refill takes 2 to 3 seconds.      Findings: Bruising present.   Neurological:      General: No focal deficit present.      Mental Status: She is alert and  oriented to person, place, and time.   Psychiatric:         Mood and Affect: Mood normal.         Behavior: Behavior normal.             Significant Labs: All pertinent labs within the past 24 hours have been reviewed.  CBC:   Recent Labs   Lab 11/01/24  0950 11/02/24  0433   WBC 16.89* 12.30   HGB 8.7* 8.5*   HCT 28.5* 27.9*    233     CMP:   Recent Labs   Lab 11/01/24  0950 11/02/24  0433    138   K 3.4* 5.0   CL 99 98   CO2 29 31*   * 140*   BUN 24* 22   CREATININE 1.0 1.0   CALCIUM 8.4* 8.8   PROT 6.7 6.7   ALBUMIN 3.0* 2.9*   BILITOT 0.6 0.5   ALKPHOS 46* 44*   AST 18 16   ALT 9* 8*   ANIONGAP 10 9     Cardiac Markers:   Recent Labs   Lab 11/01/24  0950   *     Troponin:   Recent Labs   Lab 11/01/24  0950   TROPONINIHS 46.9*     TSH:   Recent Labs   Lab 10/07/24  1713   TSH 0.876     Urine Studies:   Recent Labs   Lab 11/01/24  2239   COLORU Yellow   APPEARANCEUA Clear   PHUR 7.0   SPECGRAV >1.030*   PROTEINUA 1+*   GLUCUA Trace*   KETONESU Negative   BILIRUBINUA Negative   OCCULTUA Negative   NITRITE Negative   UROBILINOGEN Negative   LEUKOCYTESUR Negative   RBCUA 3   WBCUA 1   BACTERIA Few*   SQUAMEPITHEL 3   HYALINECASTS 0       Significant Imaging: I have reviewed all pertinent imaging results/findings within the past 24 hours.    X-Ray KUB    Result Date: 11/2/2024  EXAMINATION: XR KUB CLINICAL HISTORY: abd pain/N/V; FINDINGS: Two abdominal radiographs at 11:05 hours compared to prior exams including CT 10/17/2024 show a nonobstructive bowel gas pattern.  There is scattered air in the colon, with moderate volume of stool in the distal colon and rectum.  No evidence of intraperitoneal free air. Rim calcification in the right upper quadrant is unchanged, with no suspicious calcifications overlying the kidneys or ureters.  There are multiple calcified pelvic phleboliths, with right upper quadrant cholecystectomy clips.  There is rightward convex lumbar spinal curvature, with  intervertebral disc space narrowing and facet arthropathy in the spine. The bones are diffusely osteopenic.  Right hip hardware is incompletely visualized.  There are extensive interstitial and airspace opacities in the visualized lower lungs.     Nonobstructive bowel gas pattern. Electronically signed by: Davide Jeffrey Date:    11/02/2024 Time:    11:18    CTA Chest Non-Coronary (PE Studies)    Result Date: 11/1/2024  EXAMINATION: CTA CHEST NON CORONARY (PE STUDIES) CLINICAL HISTORY: Pulmonary embolism (PE) suspected, high prob; lung cancer TECHNIQUE: Thin axial imaging through the chest was performed with 100 mL Omnipaque 350 IV contrast, with sagittal and coronal reformatted images and MIP reconstructions performed, with images stored in the patient's permanent electronic medical record. FINDINGS: Comparison to multiple prior exams.  There are no pulmonary arterial filling defects to suggest pulmonary thromboembolism.  The central pulmonary arteries are normal in caliber. Diffuse calcified plaque involves normal-caliber thoracic aorta, with the heart enlarged and no pericardial effusion.  There are extensive coronary arterial calcifications, with dense mitral annular calcifications.  No enlarged mediastinal or hilar lymph nodes. There is unchanged volume loss in the right hemithorax, with extensive interstitial and airspace opacities throughout both lungs, in the left lung having significantly worsened compared to the 10/07/2024.  There are scattered lucencies reflecting pulmonary emphysema.  Small to moderate sized low-density bilateral pleural effusions are unchanged, with the central airways patent.  No pneumothorax. Images of the upper abdomen show stable hepatic hypodensity, cholecystectomy clips, and scattered colon diverticula.  There are no acute fractures or destructive osseous lesions.     1. Negative for pulmonary thromboembolism. 2. Extensive interstitial and airspace opacities throughout both lungs,  having significantly worsened in the left lung compared to prior CT.  Multifocal pneumonia, drug reaction, and or developing ARDS could have this appearance. 3. Unchanged small to moderate sized bilateral pleural effusions. 4. Additional observations as described. . Electronically signed by: Davide Jeffrey Date:    11/01/2024 Time:    12:51    X-Ray Chest AP Portable    Result Date: 11/1/2024  EXAMINATION: XR CHEST AP PORTABLE CLINICAL HISTORY: Sepsis; FINDINGS: Portable chest with comparison chest x-ray 10/10/2024.  Normal cardiomediastinal silhouette.Bilateral diffuse mixed interstitial and airspace lung opacities are unchanged from previous exam.  Right subclavian Port-A-Cath is unchanged.  Pulmonary vasculature is normal. No acute osseous abnormality.     No significant change of bilateral diffuse mixed interstitial and airspace lung opacities. Electronically signed by: Kp Leiva Date:    11/01/2024 Time:    10:39

## 2024-11-02 NOTE — PROGRESS NOTES
[facility] Medicine  Progress Note    Patient Name: Alexa Otero  MRN: [id number]  Patient Class: IP- Inpatient   Admission Date: 11/1/2024  Length of Stay: 1 days  Attending Physician: Cirilo Valle MD  Primary Care Provider: Idalia Greco MD        Subjective:     Principal Problem:Severe sepsis        HPI:  84-year-old female with a past medical history of lung cancer actively getting chemo last of which was 5 days ago, who presented to the ER because of generalized weakness.  According to the patient, she woke up today, tried to get out of bed, but felt very weak, and slid beside the bed.  Did not hit her head, and did not lose consciousness.  She however could not get up however, and her  could not assist.  911 was called.  On arrival of EMS, patient was satting 88% on 4 L, and appeared to be short of breath.  She was brought to the ER for evaluation.    In the ER, vitals showed a blood pressure of 145/65, heart rate of 103, respiratory rate of 20, afebrile satting 60% on 4 L.  Immediately patient was placed on non-rebreather.  CBC with white count of 16.8, and macrocytic anemia.  CMP showed hypokalemia of 3.4.  First troponin is elevated at 46.  Point of care Lactic acid was 2.1.  Blood cultures were collected.  EKG showed sinus rhythm.  Chest x-ray showed no significant change of bilateral diffuse mixed interstitial and airspace lung opacities.  CTA chest was done, and it was negative for PE, but showed extensive interstitial and airspace opacities throughout both lungs, having significantly worsened in the left lung compared to prior CT. Unchanged small to moderate sized bilateral pleural effusions.  Cefepime was ordered, and patient will be admitted to Medicine for further care of her severe sepsis.    Overview/Hospital Course:  Ms. Otero has been monitored closely during her hospitalization. She was admitted on 11/1/2024 with acute on chronic hypoxemic resp failure.  CTA chest reveals extensive interstitial opacities c/w multifocal pna and/or ARDS. She requires high flow nasal cannula up to 15L now on 13L. Pulm and ID have been consulted. Pulm recommends broad spectrum abx for another day, she is currently on cefepime and doxycycline. She has stage IV lung ca and completed Carbo/Pemetrexed last year and is currently on pemetrexed (Alimta) maintenance with last dose 10/28. This can cause an interstitial pneumonitis and pulm has started prednisone. Plan for bronchoscopy on Monday if no improvement and recommended continuing eliquis. BiPAP qHS and naps. Duonebs Q6h. ID has ordered a respiratory infection panel that is pending. She's had abdominal bloating and discomfort for some time and had an outpt CT abd/pelvis with gastric wall thickening that is is nonspecific could be related to gastritis. She has a history of peptic ulcers and PCP started pepcid. KUB on 11/2 with nonobstructive bowel gas pattern and moderate stool burden and pt was treated with laxative suppository. She has chronic macrocytic anemia that appears at baseline. She had a leukocytosis with left shift on admission that has trended down from 16K-->12K. Inflammatory markers are ordered and pending.    Interval History: reports and episode of vomiting yesterday. KUB with nonobstructive bowel gas pattern, will treat constipation with laxative suppository and start bowel regimen.    Review of Systems   Constitutional:  Positive for fatigue.   Respiratory:  Positive for cough and shortness of breath.    Gastrointestinal:  Positive for abdominal distention, constipation, nausea and vomiting.   Neurological:  Positive for weakness.     Objective:     Vital Signs (Most Recent):  Temp: 98.2 °F (36.8 °C) (11/02/24 1114)  Pulse: 84 (11/02/24 1114)  Resp: 18 (11/02/24 0823)  BP: (!) 162/74 (11/02/24 1114)  SpO2: (!) 92 % (11/02/24 1114) Vital Signs (24h Range):  Temp:  [97.8 °F (36.6 °C)-99.9 °F (37.7 °C)] 98.2 °F (36.8  °C)  Pulse:  [80-99] 84  Resp:  [18-24] 18  SpO2:  [92 %-100 %] 92 %  BP: (148-178)/(67-79) 162/74     Weight: 49.5 kg (109 lb 2 oz)  Body mass index is 18.73 kg/m².    Intake/Output Summary (Last 24 hours) at 11/2/2024 1401  Last data filed at 11/2/2024 0534  Gross per 24 hour   Intake 360 ml   Output 440 ml   Net -80 ml         Physical Exam  Constitutional:       Comments: Chronically ill appearing   HENT:      Head: Normocephalic and atraumatic.      Nose: Nose normal.      Mouth/Throat:      Mouth: Mucous membranes are moist.      Pharynx: Oropharynx is clear.   Eyes:      Pupils: Pupils are equal, round, and reactive to light.   Cardiovascular:      Rate and Rhythm: Normal rate.   Pulmonary:      Effort: Pulmonary effort is normal.      Comments: Coarse and diminished throughout  Abdominal:      Comments: Abd is firm, mildly distended and nontender Bsx4 quadrants   Musculoskeletal:         General: Normal range of motion.      Cervical back: Normal range of motion and neck supple.   Skin:     General: Skin is warm.      Capillary Refill: Capillary refill takes 2 to 3 seconds.      Findings: Bruising present.   Neurological:      General: No focal deficit present.      Mental Status: She is alert and oriented to person, place, and time.   Psychiatric:         Mood and Affect: Mood normal.         Behavior: Behavior normal.             Significant Labs: All pertinent labs within the past 24 hours have been reviewed.  CBC:   Recent Labs   Lab 11/01/24  0950 11/02/24  0433   WBC 16.89* 12.30   HGB 8.7* 8.5*   HCT 28.5* 27.9*    233     CMP:   Recent Labs   Lab 11/01/24  0950 11/02/24  0433    138   K 3.4* 5.0   CL 99 98   CO2 29 31*   * 140*   BUN 24* 22   CREATININE 1.0 1.0   CALCIUM 8.4* 8.8   PROT 6.7 6.7   ALBUMIN 3.0* 2.9*   BILITOT 0.6 0.5   ALKPHOS 46* 44*   AST 18 16   ALT 9* 8*   ANIONGAP 10 9     Cardiac Markers:   Recent Labs   Lab 11/01/24  0950   *     Troponin:   Recent  Labs   Lab 11/01/24  0950   TROPONINIHS 46.9*     TSH:   Recent Labs   Lab 10/07/24  1713   TSH 0.876     Urine Studies:   Recent Labs   Lab 11/01/24  2239   COLORU Yellow   APPEARANCEUA Clear   PHUR 7.0   SPECGRAV >1.030*   PROTEINUA 1+*   GLUCUA Trace*   KETONESU Negative   BILIRUBINUA Negative   OCCULTUA Negative   NITRITE Negative   UROBILINOGEN Negative   LEUKOCYTESUR Negative   RBCUA 3   WBCUA 1   BACTERIA Few*   SQUAMEPITHEL 3   HYALINECASTS 0       Significant Imaging: I have reviewed all pertinent imaging results/findings within the past 24 hours.    X-Ray KUB    Result Date: 11/2/2024  EXAMINATION: XR KUB CLINICAL HISTORY: abd pain/N/V; FINDINGS: Two abdominal radiographs at 11:05 hours compared to prior exams including CT 10/17/2024 show a nonobstructive bowel gas pattern.  There is scattered air in the colon, with moderate volume of stool in the distal colon and rectum.  No evidence of intraperitoneal free air. Rim calcification in the right upper quadrant is unchanged, with no suspicious calcifications overlying the kidneys or ureters.  There are multiple calcified pelvic phleboliths, with right upper quadrant cholecystectomy clips.  There is rightward convex lumbar spinal curvature, with intervertebral disc space narrowing and facet arthropathy in the spine. The bones are diffusely osteopenic.  Right hip hardware is incompletely visualized.  There are extensive interstitial and airspace opacities in the visualized lower lungs.     Nonobstructive bowel gas pattern. Electronically signed by: Davide Jeffrey Date:    11/02/2024 Time:    11:18    CTA Chest Non-Coronary (PE Studies)    Result Date: 11/1/2024  EXAMINATION: CTA CHEST NON CORONARY (PE STUDIES) CLINICAL HISTORY: Pulmonary embolism (PE) suspected, high prob; lung cancer TECHNIQUE: Thin axial imaging through the chest was performed with 100 mL Omnipaque 350 IV contrast, with sagittal and coronal reformatted images and MIP reconstructions performed,  with images stored in the patient's permanent electronic medical record. FINDINGS: Comparison to multiple prior exams.  There are no pulmonary arterial filling defects to suggest pulmonary thromboembolism.  The central pulmonary arteries are normal in caliber. Diffuse calcified plaque involves normal-caliber thoracic aorta, with the heart enlarged and no pericardial effusion.  There are extensive coronary arterial calcifications, with dense mitral annular calcifications.  No enlarged mediastinal or hilar lymph nodes. There is unchanged volume loss in the right hemithorax, with extensive interstitial and airspace opacities throughout both lungs, in the left lung having significantly worsened compared to the 10/07/2024.  There are scattered lucencies reflecting pulmonary emphysema.  Small to moderate sized low-density bilateral pleural effusions are unchanged, with the central airways patent.  No pneumothorax. Images of the upper abdomen show stable hepatic hypodensity, cholecystectomy clips, and scattered colon diverticula.  There are no acute fractures or destructive osseous lesions.     1. Negative for pulmonary thromboembolism. 2. Extensive interstitial and airspace opacities throughout both lungs, having significantly worsened in the left lung compared to prior CT.  Multifocal pneumonia, drug reaction, and or developing ARDS could have this appearance. 3. Unchanged small to moderate sized bilateral pleural effusions. 4. Additional observations as described. . Electronically signed by: Davide Jeffrey Date:    11/01/2024 Time:    12:51    X-Ray Chest AP Portable    Result Date: 11/1/2024  EXAMINATION: XR CHEST AP PORTABLE CLINICAL HISTORY: Sepsis; FINDINGS: Portable chest with comparison chest x-ray 10/10/2024.  Normal cardiomediastinal silhouette.Bilateral diffuse mixed interstitial and airspace lung opacities are unchanged from previous exam.  Right subclavian Port-A-Cath is unchanged.  Pulmonary vasculature is  "normal. No acute osseous abnormality.     No significant change of bilateral diffuse mixed interstitial and airspace lung opacities. Electronically signed by: Kp Leiva Date:    11/01/2024 Time:    10:39        Assessment/Plan:      * Severe sepsis  Admitted to telemetry  This patient does have evidence of infective focus  My overall impression is sepsis.  Source: Respiratory  Antibiotics given-   Antibiotics (72h ago, onward)      Start     Stop Route Frequency Ordered    11/02/24 0200  ceFEPIme injection 1 g         -- IV Every 12 hours (non-standard times) 11/01/24 2114    11/01/24 1422  vancomycin - pharmacy to dose  (vancomycin IVPB (PEDS and ADULTS))        Placed in "And" Linked Group    -- IV pharmacy to manage frequency 11/01/24 1322    11/01/24 1330  doxycycline capsule 100 mg         -- Oral Every 12 hours 11/01/24 1322    11/01/24 1300  mupirocin 2 % ointment         11/06/24 0859 Nasl 2 times daily 11/01/24 1200          Latest lactate reviewed-  Recent Labs   Lab 11/01/24  0955 11/01/24  1442   LACTATE  --  0.7   POCLAC 2.17  --        - Organ dysfunction indicated by acute on chronic respiratory failure  - CTA chest showed finding of Extensive interstitial and airspace opacities throughout both lungs, having significantly worsened in the left lung compared to prior CT.  Multifocal pneumonia, drug reaction, and or developing ARDS could have this appearance.   - Patient was started on cefepime and vanc with doxycycline added by admitting  - Consult Pulmonary, and Infectious Disease.  - hold IV fluids given effusions seen on images.        Acute on chronic hypoxic respiratory failure  Likely secondary to her bilateral extensive pneumonia.  On 4L NC at home  Currently on high flow nasal cannula  Pulm consulted   CTA chest ruled out PE.  Echo done last month showed normal systolic function.   Pneumonia vs. Pneumonitis? - pulm added prednisone for possible Pemetrexed induced pneumonitis     Multifocal " "pneumonia  Patient was started on cefepime, I will add atypical coverage as well as vanc.  Consult Pulmonary, and Infectious Disease.    Antibiotics (From admission, onward)      Start     Stop Route Frequency Ordered    11/02/24 0200  ceFEPIme injection 1 g         -- IV Every 12 hours (non-standard times) 11/01/24 2114    11/01/24 1422  vancomycin - pharmacy to dose  (vancomycin IVPB (PEDS and ADULTS))        Placed in "And" Linked Group    -- IV pharmacy to manage frequency 11/01/24 1322    11/01/24 1330  doxycycline capsule 100 mg         -- Oral Every 12 hours 11/01/24 1322    11/01/24 1300  mupirocin 2 % ointment         11/06/24 0859 Nasl 2 times daily 11/01/24 1200            Microbiology Results (last 7 days)       Procedure Component Value Units Date/Time    Respiratory Infection Panel (PCR), Nasopharyngeal [[id number]] Collected: 11/02/24 1329    Order Status: Completed Specimen: Nasopharyngeal Swab Updated: 11/02/24 1348     Respiratory Infection Panel Source NP swab    Narrative:      Respiratory Infection Panel source->NP Swab    MRSA Screen by PCR [[id number]] Collected: 11/02/24 1329    Order Status: Sent Specimen: Nasal Swab Updated: 11/02/24 1347    Culture, Respiratory with Gram Stain [[id number]]     Order Status: Sent Specimen: Respiratory from Sputum, Expectorated     Blood culture x two cultures. Draw prior to antibiotics. [[id number]] Collected: 11/01/24 0945    Order Status: Completed Specimen: Blood from Peripheral, Antecubital, Left Updated: 11/02/24 1032     Blood Culture, Routine No Growth to date      No Growth to date    Narrative:      Aerobic and anaerobic    Blood culture x two cultures. Draw prior to antibiotics. [[id number]] Collected: 11/01/24 0951    Order Status: Completed Specimen: Blood from Peripheral, Antecubital, Right Updated: 11/02/24 1032     Blood Culture, Routine No Growth to date      No Growth to date    Narrative:      Aerobic and anaerobic            Pleural " effusion  Bilateral, stable.   Pulm consulted  In the setting of lung ca      Malignant neoplasm of lower lobe of right lung  Carbo/Pemetrexed until 4/2024 now on Pemetrexed maintenance with last dose 10/28/24      Atrial fibrillation  Patient has paroxysmal (<7 days) atrial fibrillation. Patient is currently in sinus rhythm. [id number] Score: 5. The patients heart rate in the last 24 hours is as follows:  Pulse  Min: 80  Max: 99     Antiarrhythmics  Amiodarone and metoprolol    Anticoagulants  Eliquis    Plan  - Replete lytes with a goal of K>4, Mg >2  - Patient is anticoagulated, see medications listed above.  - Patient's afib is currently controlled      VTE Risk Mitigation (From admission, onward)           Ordered     apixaban tablet 2.5 mg  2 times daily         11/01/24 1316     IP VTE HIGH RISK PATIENT  Once         11/01/24 1158     Place sequential compression device  Until discontinued         11/01/24 1158                    Discharge Planning   ALYCIA:      Code Status: DNR   Is the patient medically ready for discharge?:     Reason for patient still in hospital (select all that apply): Patient trending condition, Treatment, and Consult recommendations  Discharge Plan A: Home Health                  Gila Carranza NP  Department of Hospital Medicine   [facility]

## 2024-11-02 NOTE — PROGRESS NOTES
Pharmacist Renal Dose Adjustment Note    Alexa Otero is a 84 y.o. female being treated with the medication Cefepime.    Patient Data:    Vital Signs (Most Recent):  Temp: 99.9 °F (37.7 °C) (11/01/24 1930)  Pulse: 83 (11/01/24 1930)  Resp: 18 (11/01/24 1930)  BP: (!) 164/76 (11/01/24 1930)  SpO2: 95 % (11/01/24 1930) Vital Signs (72h Range):  Temp:  [98.7 °F (37.1 °C)-99.9 °F (37.7 °C)]   Pulse:  []   Resp:  [18-24]   BP: (133-178)/(62-78)   SpO2:  [60 %-100 %]      Recent Labs   Lab 11/01/24  0950   CREATININE 1.0     Serum creatinine: 1 mg/dL 11/01/24 0950  Estimated creatinine clearance: 33.3 mL/min    Cefepime 1 gram IV every 8 hours will be changed to Cefepime 1 gram IV every 12 hours due to CrCl- 30-60 ml/min.    Pharmacist's Name: Nisreen Floyd  Pharmacist's Extension: 1956

## 2024-11-03 ENCOUNTER — PATIENT MESSAGE (OUTPATIENT)
Dept: PULMONOLOGY | Facility: CLINIC | Age: 84
End: 2024-11-03
Payer: MEDICARE

## 2024-11-03 PROBLEM — R33.9 URINARY RETENTION: Status: ACTIVE | Noted: 2024-11-03

## 2024-11-03 PROBLEM — D53.9 MACROCYTIC ANEMIA: Status: ACTIVE | Noted: 2024-11-03

## 2024-11-03 NOTE — ASSESSMENT & PLAN NOTE
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 Most recent hemoglobin and hematocrit are listed below.  Recent Labs     11/01/24  0950 11/02/24  0433 11/03/24  0810   HGB 8.7* 8.5* 7.7*   HCT 28.5* 27.9* 24.9*     Plan  - Monitor serial CBC: Daily  - Transfuse PRBC if patient becomes hemodynamically unstable, symptomatic or H/H drops below 7/21.  - Patient has not received any PRBC transfusions to date  - Patient's anemia is currently worsening. Will continue current treatment  - T&S in AM in the event she requires a transfusion

## 2024-11-03 NOTE — PLAN OF CARE
Problem: Adult Inpatient Plan of Care  Goal: Plan of Care Review  Outcome: Progressing  Goal: Optimal Comfort and Wellbeing  Outcome: Progressing     Problem: Sepsis/Septic Shock  Goal: Absence of Infection Signs and Symptoms  Outcome: Progressing     Problem: Fall Injury Risk  Goal: Absence of Fall and Fall-Related Injury  Outcome: Progressing     Problem: Skin Injury Risk Increased  Goal: Skin Health and Integrity  Outcome: Progressing     Problem: Gas Exchange Impaired  Goal: Optimal Gas Exchange  Outcome: Progressing

## 2024-11-03 NOTE — CONSULTS
Pulmonary/Critical Care Consult      PATIENT NAME: Alexa Otero  MRN: [id number]  TODAY'S DATE: 2024  11:55 AM  ADMIT DATE: 2024  AGE: 84 y.o. 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    She reports progressive fatigue weakness prior to admission to the hospital.  She reports that she slid out of her bed and that is the reason she came to the hospital.  She reports that she has not had any fever cough night sweats, but she has some worsening shortness of breath.    Today   - rough night, had worsening shortness of breath and is required non-rebreather in addition no nasal cannula currently she is on high-flow at 10 liters/minute    Review of Systems   Constitutional:  Positive for appetite change and fatigue. Negative for chills, fever and unexpected weight change.   HENT:  Negative for nosebleeds, postnasal drip, trouble swallowing and voice change.    Eyes:  Negative for visual disturbance.   Respiratory:  Positive for shortness of breath. Negative for cough and wheezing.    Cardiovascular:  Negative for chest pain and leg swelling.   Gastrointestinal:  Negative for diarrhea, nausea and vomiting.   Endocrine: Negative for cold intolerance and heat intolerance.   Genitourinary:  Negative for dysuria, flank pain and frequency.   Musculoskeletal:  Negative for neck pain and neck stiffness.   Allergic/Immunologic: Negative for environmental allergies and immunocompromised state.   Neurological:  Negative for syncope, speech difficulty and weakness.   Hematological:  Negative for adenopathy.   Psychiatric/Behavioral:   Negative for confusion.            ALLERGIES  Review of patient's allergies indicates:  No Known Allergies    INPATIENT SCHEDULED MEDICATIONS   albuterol-ipratropium  3 mL Nebulization Q6H WAKE    amiodarone  200 mg Oral BID    apixaban  2.5 mg Oral BID    ceFEPime IV (PEDS and ADULTS)  1 g Intravenous Q12H    doxycycline  100 mg Oral Q12H    folic acid  1 mg Oral Daily    gabapentin  100 mg Oral BID    magnesium oxide  400 mg Oral Daily    metoprolol succinate  25 mg Oral Daily    mupirocin   Nasal BID    pantoprazole  40 mg Oral Daily    polyethylene glycol  17 g Oral Daily    predniSONE  40 mg Oral Daily    sucralfate  1 g Oral QID    vancomycin (VANCOCIN) IV (PEDS and ADULTS)  750 mg Intravenous Q24H         MEDICAL AND SURGICAL HISTORY  Past Medical History:   Diagnosis Date    Arthritis     Cardiac arrest 10/2023    Cataract     Chronic obstructive pulmonary disease, unspecified COPD type 03/20/2024    Colon polyp     Coronary artery disease 03/20/2024    Diabetes mellitus type II 2012    Encounter for blood transfusion     Extra-ovarian endometrioid adenocarcinoma 2020    GERD (gastroesophageal reflux disease)     History of chronic cough     clear productive    Hyperlipidemia     Hypertension     Macular degeneration     Ovarian cancer     PONV (postoperative nausea and vomiting)     Squamous cell carcinoma 1980's    precancer of cervix     Past Surgical History:   Procedure Laterality Date    AUGMENTATION OF BREAST      BRONCHOSCOPY N/A 12/12/2023    Procedure: BRONCHOSCOPY;  Surgeon: Neva Christian MD;  Location: [facility];  Service: ENT;  Laterality: N/A;    BRONCHOSCOPY WITH FLUOROSCOPY Right 05/11/2023    Procedure: BRONCHOSCOPY, WITH FLUOROSCOPY;  Surgeon: Neva Christian MD;  Location: [facility];  Service: Pulmonary;  Laterality: Right;    BRONCHOSCOPY WITH FLUOROSCOPY N/A 12/15/2023    Procedure: BRONCHOSCOPY, WITH FLUOROSCOPY;  Surgeon: Neva Christian MD;  Location: [facility];  Service:  Pulmonary;  Laterality: N/A;    CATARACT EXTRACTION BILATERAL W/ ANTERIOR VITRECTOMY Bilateral     CHOLECYSTECTOMY      COLONOSCOPY      COLONOSCOPY N/A 04/20/2023    Procedure: COLONOSCOPY;  Surgeon: Sabino Stephenson MD;  Location: [facility];  Service: Endoscopy;  Laterality: N/A;    CORONARY STENT PLACEMENT  10/2023    x 3    ESOPHAGOGASTRODUODENOSCOPY N/A 06/20/2018    Procedure: EGD (ESOPHAGOGASTRODUODENOSCOPY);  Surgeon: Sabino Stephenson MD;  Location: [facility] ENDO;  Service: Endoscopy;  Laterality: N/A;    ESOPHAGOGASTRODUODENOSCOPY N/A 03/31/2023    Procedure: EGD (ESOPHAGOGASTRODUODENOSCOPY);  Surgeon: Sabino Stephenson MD;  Location: [facility] ENDO;  Service: Endoscopy;  Laterality: N/A;    ESOPHAGOGASTRODUODENOSCOPY N/A 12/11/2023    Procedure: EGD (ESOPHAGOGASTRODUODENOSCOPY);  Surgeon: Pretty Salgado MD;  Location: [facility];  Service: Endoscopy;  Laterality: N/A;    HYSTERECTOMY  1976    partial    INJECTION OF ANESTHETIC AGENT AROUND MEDIAL BRANCH NERVES INNERVATING LUMBAR FACET JOINT Right 05/10/2023    Procedure: Block-nerve-Lateral-branch-lumbar;  Surgeon: Agustin Robles MD;  Location: [facility];  Service: Pain Management;  Laterality: Right;  L5 and s1,s2 LBB    INJECTION OF ANESTHETIC AGENT AROUND MEDIAL BRANCH NERVES INNERVATING LUMBAR FACET JOINT Right 05/30/2023    Procedure: Block-nerve-medial branch-lumbar;  Surgeon: Agustin Robles MD;  Location: [facility];  Service: Pain Management;  Laterality: Right;  L5, s1 ,s2 LBB #2    INJECTION OF ANESTHETIC AGENT AROUND NERVE Right 10/31/2023    Procedure: INTERCOSTAL NERVE BLOCK;  Surgeon: Washington Shankar MD;  Location: [facility] OR;  Service: Cardiothoracic;  Laterality: Right;    INJECTION, SACROILIAC JOINT Right 01/26/2023    Procedure: INJECTION,SACROILIAC JOINT;  Surgeon: Agustin Robles MD;  Location: [facility] OR;  Service: Pain Management;  Laterality: Right;    INSERTION OF TUNNELED CENTRAL VENOUS CATHETER (CVC) WITH SUBCUTANEOUS PORT Right 10/19/2020    Procedure:  INSERTION, PORT-A-CATH;  Surgeon: Misti Mcfarland MD;  Location: [facility];  Service: General;  Laterality: Right;    INTRAMEDULLARY RODDING OF TROCHANTER OF FEMUR Right 2021    Procedure: INSERTION, INTRAMEDULLARY LUC, FEMUR, TROCHANTER/RIGHT TFN DR FAJARDO NOTIFIED REP;  Surgeon: Kp Fajardo MD;  Location: [facility] OR;  Service: Orthopedics;  Laterality: Right;  SKIP    ROBOT-ASSISTED LAPAROSCOPIC LYMPHADENECTOMY USING DA NELLA XI N/A 2020    Procedure: XI ROBOTIC LYMPHADENECTOMY-pelvic and kell-aortic;  Surgeon: Altagracia Ray MD;  Location: [facility] OR;  Service: OB/GYN;  Laterality: N/A;    ROBOT-ASSISTED LAPAROSCOPIC OMENTECTOMY USING DA NELLA XI N/A 2020    Procedure: XI ROBOTIC OMENTECTOMY;  Surgeon: Altagracia Ray MD;  Location: [facility] OR;  Service: OB/GYN;  Laterality: N/A;    ROBOT-ASSISTED LAPAROSCOPIC SALPINGO-OOPHORECTOMY USING DA NELLA XI Bilateral 2020    Procedure: XI ROBOTIC SALPINGO-OOPHORECTOMY;  Surgeon: Altagracia Ray MD;  Location: [facility] OR;  Service: OB/GYN;  Laterality: Bilateral;    THORACOSCOPIC BIOPSY OF PLEURA Right 10/31/2023    Procedure: VATS, WITH PLEURA BIOPSY;  Surgeon: Washington Shankar MD;  Location: [facility];  Service: Cardiothoracic;  Laterality: Right;  FROZEN SECTION   **KRISTEN-ASSISDT**    THORACOTOMY Right 10/31/2023    Procedure: THORACOTOMY;  Surgeon: Washington Shankar MD;  Location: [facility];  Service: Cardiothoracic;  Laterality: Right;    UPPER GASTROINTESTINAL ENDOSCOPY         ALCOHOL, TOBACCO AND DRUG USE  Social History     Tobacco Use   Smoking Status Former    Current packs/day: 0.00    Average packs/day: 1 pack/day for 20.0 years (20.0 ttl pk-yrs)    Types: Cigarettes    Start date:     Quit date:     Years since quittin.8   Smokeless Tobacco Never   Tobacco Comments    quit 40 yrs ago     Social History     Substance and Sexual Activity   Alcohol Use Yes    Alcohol/week: 1.0 standard drink of alcohol    Types: 1  Glasses of wine per week    Comment: occasional     Social History     Substance and Sexual Activity   Drug Use No       FAMILY HISTORY  Family History   Problem Relation Name Age of Onset    Cancer Mother  57        lung    Cancer Father  56        oral    Skin cancer Sister      Skin cancer Brother      Melanoma Brother      Skin cancer Brother      Breast cancer Maternal Grandmother      Breast cancer Paternal Grandmother      Colon polyps Daughter      Psoriasis Neg Hx      Lupus Neg Hx      Eczema Neg Hx      Colon cancer Neg Hx      Crohn's disease Neg Hx      Esophageal cancer Neg Hx      Stomach cancer Neg Hx      Ulcerative colitis Neg Hx         VITAL SIGNS (MOST RECENT)  Temp: 97.4 °F (36.3 °C) (11/03/24 0802)  Pulse: 79 (11/03/24 0802)  Resp: 18 (11/03/24 0802)  BP: 107/60 (76) (11/03/24 0802)  SpO2: 99 % (11/03/24 0802)    INTAKE AND OUTPUT (LAST 24 HOURS):  Intake/Output Summary (Last 24 hours) at 11/3/2024 1020  Last data filed at 11/3/2024 0224  Gross per 24 hour   Intake 300 ml   Output 3330 ml   Net -3030 ml       WEIGHT  Wt Readings from Last 1 Encounters:   11/01/24 49.5 kg (109 lb 2 oz)       Physical Exam  Vitals reviewed.   Constitutional:       General: She is not in acute distress.     Appearance: She is well-developed. She is not diaphoretic.   HENT:      Head: Normocephalic and atraumatic.      Mouth/Throat:      Pharynx: No oropharyngeal exudate or posterior oropharyngeal erythema.   Eyes:      General: No scleral icterus.     Pupils: Pupils are equal, round, and reactive to light.   Neck:      Vascular: No JVD.   Cardiovascular:      Rate and Rhythm: Normal rate and regular rhythm.      Heart sounds: Normal heart sounds. No murmur heard.  Pulmonary:      Effort: Pulmonary effort is normal. No respiratory distress.      Breath sounds: Rales present. No wheezing.   Abdominal:      General: Bowel sounds are normal. There is distension.      Palpations: Abdomen is soft.      Tenderness:  "There is no abdominal tenderness.   Musculoskeletal:         General: No swelling.      Cervical back: Normal range of motion and neck supple. No rigidity.   Skin:     General: Skin is warm and dry.      Capillary Refill: Capillary refill takes less than 2 seconds.      Coloration: Skin is not pale.      Findings: No rash.   Neurological:      General: No focal deficit present.      Mental Status: She is alert and oriented to person, place, and time.      Cranial Nerves: No cranial nerve deficit.      Motor: No weakness or abnormal muscle tone.           ACUTE PHASE REACTANT (LAST 24 HOURS)  Recent Labs   Lab 11/02/24  1335   CRP 36.90*   *       CBC LAST (LAST 24 HOURS)  Recent Labs   Lab 11/03/24  0810   WBC 8.39   RBC 2.37*   HGB 7.7*   HCT 24.9*   *   MCH 32.5*   MCHC 30.9*   RDW 18.2*      MPV 9.3   GRAN 91.8*  7.7   LYMPH 5.1*  0.4*   MONO 1.9*  0.2*   BASO 0.01   NRBC 0       CHEMISTRY LAST (LAST 24 HOURS)  Recent Labs   Lab 11/03/24  0810      K 4.3   CL 96   CO2 33*   ANIONGAP 8   BUN 28*   CREATININE 1.1   *   CALCIUM 8.8   MG 2.0   ALBUMIN 2.7*   PROT 6.0   ALKPHOS 50*   ALT 9*   AST 13   BILITOT 0.4       COAGULATION LAST (LAST 24 HOURS)  No results for input(s): "LABPT", "INR", "APTT" in the last 24 hours.    CARDIAC PROFILE (LAST 24 HOURS)  Recent Labs   Lab 11/01/24  0950 11/02/24  1335   *  --    LDH  --  413*   TROPONINIHS 46.9*  --        LAST 7 DAYS MICROBIOLOGY   Microbiology Results (last 7 days)       Procedure Component Value Units Date/Time    MRSA Screen by PCR [[id number]] Collected: 11/02/24 1329    Order Status: Completed Specimen: Nasal Swab Updated: 11/02/24 1532     MRSA SCREEN BY PCR Negative    Respiratory Infection Panel (PCR), Nasopharyngeal [[id number]] Collected: 11/02/24 1329    Order Status: Completed Specimen: Nasopharyngeal Swab Updated: 11/02/24 1509     Respiratory Infection Panel Source NP swab     Adenovirus Not Detected     " Coronavirus 229E, Common Cold Virus Not Detected     Coronavirus HKU1, Common Cold Virus Not Detected     Coronavirus NL63, Common Cold Virus Not Detected     Coronavirus OC43, Common Cold Virus Not Detected     Comment: Coronavirus strains 229E, HKU1, NL63, and OC43 can cause the common   cold   and are not associated with the respiratory disease outbreak caused   by  the COVID-19 (SARS-CoV-2 novel Coronavirus) strain.           SARS-CoV2 (COVID-19) Qualitative PCR Not Detected     Human Metapneumovirus Not Detected     Human Rhinovirus/Enterovirus Not Detected     Influenza A (subtypes H1, H1-2009,H3) Not Detected     Influenza B Not Detected     Parainfluenza Virus 1 Not Detected     Parainfluenza Virus 2 Not Detected     Parainfluenza Virus 3 Not Detected     Parainfluenza Virus 4 Not Detected     Respiratory Syncytial Virus Not Detected     Bordetella Parapertussis (GB4473) Not Detected     Bordetella pertussis (ptxP) Not Detected     Chlamydia pneumoniae Not Detected     Mycoplasma pneumoniae Not Detected     Comment: Respiratory Infection Panel testing performed by Multiplex PCR.       Narrative:      Respiratory Infection Panel source->NP Swab    Culture, Respiratory with Gram Stain [[id number]]     Order Status: Sent Specimen: Respiratory from Sputum, Expectorated     Blood culture x two cultures. Draw prior to antibiotics. [[id number]] Collected: 11/01/24 0945    Order Status: Completed Specimen: Blood from Peripheral, Antecubital, Left Updated: 11/02/24 1032     Blood Culture, Routine No Growth to date      No Growth to date    Narrative:      Aerobic and anaerobic    Blood culture x two cultures. Draw prior to antibiotics. [[id number]] Collected: 11/01/24 0951    Order Status: Completed Specimen: Blood from Peripheral, Antecubital, Right Updated: 11/02/24 1032     Blood Culture, Routine No Growth to date      No Growth to date    Narrative:      Aerobic and anaerobic            MOST RECENT IMAGING  X-Ray  KUB  Narrative: EXAMINATION:  XR KUB    CLINICAL HISTORY:  abd pain/N/V;    FINDINGS:  Two abdominal radiographs at 11:05 hours compared to prior exams including CT 10/17/2024 show a nonobstructive bowel gas pattern.  There is scattered air in the colon, with moderate volume of stool in the distal colon and rectum.  No evidence of intraperitoneal free air.    Rim calcification in the right upper quadrant is unchanged, with no suspicious calcifications overlying the kidneys or ureters.  There are multiple calcified pelvic phleboliths, with right upper quadrant cholecystectomy clips.  There is rightward convex lumbar spinal curvature, with intervertebral disc space narrowing and facet arthropathy in the spine.    The bones are diffusely osteopenic.  Right hip hardware is incompletely visualized.  There are extensive interstitial and airspace opacities in the visualized lower lungs.  Impression: Nonobstructive bowel gas pattern.    Electronically signed by: Davide Jeffrey  Date:    11/02/2024  Time:    11:18      CURRENT VISIT EKG  Results for orders placed or performed during the hospital encounter of 11/01/24   EKG 12-lead   Result Value Ref Range    QRS Duration 56 ms    [facility] QTC Calculation 482 ms    Narrative    Test Reason : R06.02,    Vent. Rate : 097 BPM     Atrial Rate : 097 BPM     P-R Int : 156 ms          QRS Dur : 056 ms      QT Int : 380 ms       P-R-T Axes : 044 -06 100 degrees     QTc Int : 482 ms    Normal sinus rhythm  Low voltage QRS  Septal infarct (cited on or before 13-OCT-2023)  Abnormal ECG  When compared with ECG of 25-OCT-2024 10:48,  ST now depressed in Lateral leads    Referred By: [email or web address]   SELF           Confirmed By:        ECHOCARDIOGRAM RESULTS  Results for orders placed during the hospital encounter of 10/07/24    Echo    Interpretation Summary    Left Ventricle: The left ventricle is normal in size. Mildly increased wall thickness. There is mild concentric hypertrophy. Moderate global  "hypokinesis present. There is mildly reduced systolic function with a visually estimated ejection fraction of 40 - 45%. There is normal diastolic function.    Right Ventricle: Normal right ventricular cavity size. Wall thickness is normal. Systolic function is normal.    Left Atrium: Left atrium is moderately dilated.    Mitral Valve: There is bileaflet sclerosis. There is moderate mitral annular calcification present. There is moderate stenosis. The mean pressure gradient across the mitral valve is 8 mmHg at a heart rate of  bpm. There is mild regurgitation with a centrally directed jet.    Pulmonary Artery: The estimated pulmonary artery systolic pressure is 29 mmHg.    IVC/SVC: Normal venous pressure at 3 mmHg.        VENTILATOR INFORMATION              LAST ARTERIAL BLOOD GAS  ABG  No results for input(s): "PH", "PO2", "PCO2", "HCO3", "BE" in the last 168 hours.                ASSESSMENT:   Hypoxemic respiratory failure  Abnormal CT  History of lung adenocarcinoma on Alimta    Ms. Otero is a 84-year-old woman who presents with abnormal CT imaging.     She has clear underlying lung disease related to likely hx of smoking and there is evidence of significant intralobular emphysema.     Additionally on review of her imaging, she clearly has evidence of right-sided lung cancer that has continued to be present on her CT scans and appears to be progressive.  There is some indication that she has endobronchial disease as well.    Many differentials to consider- progression of malignancy, pneumonia and or atypical infections or potentially pneumonitis - there are some case reposrt related to Alimta.     Continue antibiotic therapy.   She has had worsening respiratory failure since yesterday.  She also had some urinary retention, this has provided some relief with indwelling Rausch  Protocol is positive  I have some concern as her respiratory failure is worsening, at her current level of oxygenation this would not allow " for a bronchoscopy without intubation.  Considering her code status, this would not be consistent with her current wishes.  Plan to treat her respiratory failure empirically, continue antibiotic therapy, we will plan on adding steroid therapy today    PLAN:.      - Keep NPO at midnight on Sunday for tentative bronch next Mon  - Continue antibiotics as you are   - Ok to continue eliquis this will not affect bronchoscopy  - Continue duo nebs and will add solumedrol about 1mg/kg daily   - Continue HFNC   - The patient is DNR/DNI- I would not upgrade her to ICU due to the fact she is not interested in extreme measures such as chest compressions and intubation.     Reid Garrett MD  Date of Service: 11/03/2024  11:55 AM

## 2024-11-03 NOTE — ASSESSMENT & PLAN NOTE
Likely secondary to her bilateral extensive pneumonia.  On 4L NC at home  Currently on high flow nasal cannula and intermittent bipap  Pulm consulted   CTA chest ruled out PE.  Echo done last month showed normal systolic function.   Pneumonia vs. Pneumonitis? - pulm added prednisone initially and escalated to IV solumedrol for possible Pemetrexed induced pneumonitis   Amiodarone contributing?  IV lasix on 11/3

## 2024-11-03 NOTE — PROGRESS NOTES
"[facility]   Department of Infectious Disease  Progress Note        PATIENT NAME: Alexa Otero  MRN: [id number]  TODAY'S DATE: 11/03/2024  ADMIT DATE: 11/1/2024  LOS: 2 days    CHIEF COMPLAINT: Generalized Weakness (Per EMS, patient slipped out of bed onto floor hitting bottom. Did not hit head. Realized she was weaker than normal. Hx lung cancer. Sating 60s  on 4L in triage.)      PRINCIPLE PROBLEM: Severe sepsis    INTERVAL HISTORY      11/03/2024:  Seen I was at bedside.  No acute issues overnight.  Currently on BiPAP.  Has left side pain which he said predated her fall and recent admission.  No mention of rib fracture on her recent CT chest.    Antibiotics (From admission, onward)      Start     Stop Route Frequency Ordered    11/02/24 1700  vancomycin 750 mg in dextrose 5 % 250 mL IVPB (ready to mix)         -- IV Every 24 hours (non-standard times) 11/02/24 1638    11/02/24 0200  ceFEPIme injection 1 g         -- IV Every 12 hours (non-standard times) 11/01/24 2114    11/01/24 1422  vancomycin - pharmacy to dose  (vancomycin IVPB (PEDS and ADULTS))        Placed in "And" Linked Group    -- IV pharmacy to manage frequency 11/01/24 1322    11/01/24 1330  doxycycline capsule 100 mg         -- Oral Every 12 hours 11/01/24 1322    11/01/24 1300  mupirocin 2 % ointment         11/06/24 0859 Nasl 2 times daily 11/01/24 1200          Antifungals (From admission, onward)      None           Antivirals (From admission, onward)      None            ASSESSMENT and PLAN      1. Respiratory failure in a patient with lung cancer and background COPD.  Also CT chest sure increase bilateral pulmonary infiltrates.  Agree with antibiotics for coverage of pneumonia.  Interstitial pneumonitis from Alimta is a possibility.  Check COVID-19 assay and respiratory panel.  Add remdesivir is COVID positive.  Continue other symptomatic management with steroid and oxygen as per hospitalist and pulmonologist.     2.   Lung " cancer.  On Alimta     3. COPD.  Management as per pulmonologist and hospitalist.       4. Chronic upper abdominal pain with gastritis.    5. Left posterior rib pain.  Maybe related to pneumoniae with pleurisy.  Need to rule out rib fracture.  No rebound abnormality mention on recent CT and x-ray of the chest.    RECOMMENDATIONS  Continue current antibiotics   Management of respiratory failure as per pulmonologist  Reviewed CT and x-ray with radiologist to rule out left posterior rib fracture    Please send Epic secure chat with any questions.  Discussed with patient and her daughter at bedside.      SUBJECTIVE    Alexa Otero is a 84 y.o. female she has a history of lung cancer status post lower lobectomy in October 20, 2023 followed by adjuvant chemotherapy from December 20, 2023 through May 20, 2024.  She has resumed chemotherapy again in July 20, 2024 with Alimta.  She has a background COPD and is on 3L oxygen per minute at home.  Also with a chronic intermittent upper abdominal pain for the last 1 year has been diagnosed with gastritis.     Presents to the ER 11/01/2024 with fatigue.  She has slid down to the ground when she got out of bed due to fatigue.  EMS was activated.  She was saturating at 88% on 4 L. brought to the ER for inpatient care.  Chest x-ray was stable.  CT chest showed worsened bilateral infiltrate with also bilateral pleural effusion compared to previous CT.  She was admitted and placed on broad-spectrum antibiotics.  She has been evaluated by pulmonologist with plan for bronchoscopy early next week.       Antibiotic history:  Vancomycin: 11/01/2024-  Cefepime:  11/01/2024-  Doxycycline: 11/01/2024-       Microbiology:   Blood culture 11/01/2024: NGTD     Review of Systems  Negative except as stated above in Interval History     OBJECTIVE   Temp:  [97 °F (36.1 °C)-97.7 °F (36.5 °C)] 97.1 °F (36.2 °C)  Pulse:  [72-91] 77  Resp:  [12-19] 18  SpO2:  [85 %-99 %] 96 %  BP: (107-174)/(60-84)  126/63  Temp:  [97 °F (36.1 °C)-97.7 °F (36.5 °C)]   Temp: 97.1 °F (36.2 °C) (11/03/24 1118)  Pulse: 77 (11/03/24 1118)  Resp: 18 (11/03/24 1118)  BP: 126/63 (84) (11/03/24 1118)  SpO2: 96 % (11/03/24 1300)    Intake/Output Summary (Last 24 hours) at 11/3/2024 1333  Last data filed at 11/3/2024 0900  Gross per 24 hour   Intake 420 ml   Output 3330 ml   Net -2910 ml     Physical Examination  General:  Thin elderly woman lying in bed.  She is in moderate respiratory distress.  Now on BiPAP 10/5, 70%.  HEENT: No oral thrush, no cervical adenopathy   Respiratory:  Coarse breath sounds left lung.  Otherwise Clear to auscultation anteriorly   CVS: S1 and 2 heard   Abdomen:  Full, soft, nontender, no palpable organomegaly   Skin:  No rash appreciated   CNS: No focal deficits   Musculoskeletal: No joint or bony abnormalities appreciated     VAD:  Right chest MediPort  ISOLATION:  None     Wounds:  None      Significant Labs: All pertinent labs within the past 24 hours have been reviewed.    CBC LAST 7 DAYS  Recent Labs   Lab 11/01/24  0950 11/02/24  0433 11/03/24  0810   WBC 16.89* 12.30 8.39   RBC 2.76* 2.69* 2.37*   HGB 8.7* 8.5* 7.7*   HCT 28.5* 27.9* 24.9*   * 104* 105*   MCH 31.5* 31.6* 32.5*   MCHC 30.5* 30.5* 30.9*   RDW 18.5* 18.6* 18.2*    233 163   MPV 8.9* 9.4 9.3   GRAN 96.8*  16.4* 94.6*  11.6* 91.8*  7.7   LYMPH 0.9*  0.2* 3.5*  0.4* 5.1*  0.4*   MONO 0.4*  0.1* 0.8*  0.1* 1.9*  0.2*   BASO 0.01 0.01 0.01   NRBC 0 0 0       CHEMISTRY LAST 7 DAYS  Recent Labs   Lab 11/01/24  0950 11/02/24  0433 11/03/24  0810    138 137   K 3.4* 5.0 4.3   CL 99 98 96   CO2 29 31* 33*   ANIONGAP 10 9 8   BUN 24* 22 28*   CREATININE 1.0 1.0 1.1   * 140* 146*   CALCIUM 8.4* 8.8 8.8   MG 1.8 1.9 2.0   ALBUMIN 3.0* 2.9* 2.7*   PROT 6.7 6.7 6.0   ALKPHOS 46* 44* 50*   ALT 9* 8* 9*   AST 18 16 13   BILITOT 0.6 0.5 0.4       Estimated Creatinine Clearance: 30.3 mL/min (based on SCr of 1.1  "mg/dL).    INFLAMMATORY/PROCAL  LAST 7 DAYS  Recent Labs   Lab 11/02/24  1335   PROCAL 2.950*   CRP 36.90*     No results found for: "ESR"  CRP   Date Value Ref Range Status   11/02/2024 36.90 (H) <1.00 mg/dL Final     Comment:     CRP-Normal Application expected values:   <1.0        mg/dL   Normal Range  1.0 - 5.0  mg/dL   Indicates mild inflammation  5.0 - 10.0 mg/dL   Indicates severe inflammation  >10.0        mg/dL   Represents serious processes and   frequently         indicates the presence of a bacterial   infection.      12/11/2023 39.3 (H) 0.0 - 8.2 mg/L Final   12/10/2023 65.7 (H) 0.0 - 8.2 mg/L Final   12/04/2023 378.6 (H) 0.0 - 8.2 mg/L Final       PRIOR  MICROBIOLOGY:    Susceptibility data from last 90 days.  Collected Specimen Info Organism Amp/Sulbactam Ampicillin Cefazolin Cefepime Ceftriaxone Ciprofloxacin Ertapenem Gentamicin Levofloxacin Meropenem Nitrofurantoin PIPERACILLIN/TAZO SUSCEPTIBILITY Tobramycin Trimeth/Sulfa   10/18/24 Urine, Clean Catch Escherichia coli  R  R  S  S  S  S  S  S  S  S  S  S  S  R   10/07/24 Blood from Peripheral, Hand, Right No growth after 5 days.                 10/07/24 Blood from Peripheral, Hand, Left No growth after 5 days.                     LAST 7 DAYS MICROBIOLOGY   Microbiology Results (last 7 days)       Procedure Component Value Units Date/Time    Blood culture x two cultures. Draw prior to antibiotics. [[id number]] Collected: 11/01/24 1045    Order Status: Completed Specimen: Blood from Peripheral, Antecubital, Left Updated: 11/03/24 1032     Blood Culture, Routine No Growth to date      No Growth to date      No Growth to date    Narrative:      Aerobic and anaerobic    Blood culture x two cultures. Draw prior to antibiotics. [[id number]] Collected: 11/01/24 1051    Order Status: Completed Specimen: Blood from Peripheral, Antecubital, Right Updated: 11/03/24 1032     Blood Culture, Routine No Growth to date      No Growth to date      No Growth to date "    Narrative:      Aerobic and anaerobic    MRSA Screen by PCR [[id number]] Collected: 11/02/24 1329    Order Status: Completed Specimen: Nasal Swab Updated: 11/02/24 1532     MRSA SCREEN BY PCR Negative    Respiratory Infection Panel (PCR), Nasopharyngeal [[id number]] Collected: 11/02/24 1329    Order Status: Completed Specimen: Nasopharyngeal Swab Updated: 11/02/24 1509     Respiratory Infection Panel Source NP swab     Adenovirus Not Detected     Coronavirus 229E, Common Cold Virus Not Detected     Coronavirus HKU1, Common Cold Virus Not Detected     Coronavirus NL63, Common Cold Virus Not Detected     Coronavirus OC43, Common Cold Virus Not Detected     Comment: Coronavirus strains 229E, HKU1, NL63, and OC43 can cause the common   cold   and are not associated with the respiratory disease outbreak caused   by  the COVID-19 (SARS-CoV-2 novel Coronavirus) strain.           SARS-CoV2 (COVID-19) Qualitative PCR Not Detected     Human Metapneumovirus Not Detected     Human Rhinovirus/Enterovirus Not Detected     Influenza A (subtypes H1, H1-2009,H3) Not Detected     Influenza B Not Detected     Parainfluenza Virus 1 Not Detected     Parainfluenza Virus 2 Not Detected     Parainfluenza Virus 3 Not Detected     Parainfluenza Virus 4 Not Detected     Respiratory Syncytial Virus Not Detected     Bordetella Parapertussis ([id number]) Not Detected     Bordetella pertussis (ptxP) Not Detected     Chlamydia pneumoniae Not Detected     Mycoplasma pneumoniae Not Detected     Comment: Respiratory Infection Panel testing performed by Multiplex PCR.       Narrative:      Respiratory Infection Panel source->NP Swab    Culture, Respiratory with Gram Stain [[id number]]     Order Status: Sent Specimen: Respiratory from Sputum, Expectorated               CURRENT/PREVIOUS VISIT EKG  Results for orders placed or performed during the hospital encounter of 11/01/24   EKG 12-lead    Collection Time: 11/01/24  9:53 AM   Result Value Ref Range     QRS Duration 56 ms    [facility] QTC Calculation 482 ms    Narrative    Test Reason : R06.02,    Vent. Rate : 097 BPM     Atrial Rate : 097 BPM     P-R Int : 156 ms          QRS Dur : 056 ms      QT Int : 380 ms       P-R-T Axes : 044 -06 100 degrees     QTc Int : 482 ms    Normal sinus rhythm  Low voltage QRS  Septal infarct (cited on or before 13-OCT-2023)  Abnormal ECG  When compared with ECG of 25-OCT-2024 10:48,  ST now depressed in Lateral leads    Referred By: [email or web address]   SELF           Confirmed By:        Significant Imaging: I have reviewed all relevant and available imaging results/findings within the past 24 hours.    I spent a total of 50 minutes on the day of the visit.This includes face to face time and non-face to face time preparing to see the patient (eg, review of tests), obtaining and/or reviewing separately obtained history, documenting clinical information in the electronic or other health record, independently interpreting results and communicating results to the patient/family/caregiver, or care coordinator.    Byron Galvez MD  Date of Service: 11/03/2024      This note was created using M "[employer] voice recognition software that occasionally misinterpreted phrases or words.

## 2024-11-03 NOTE — ASSESSMENT & PLAN NOTE
Constipation likely contributing  She had a couple of Bms and started bowel regimen  Rausch placed for significant retention   Can hopefully bladder train and remove soon

## 2024-11-03 NOTE — PROGRESS NOTES
[facility] Medicine  Progress Note    Patient Name: Alexa Otero  MRN: [id number]  Patient Class: IP- Inpatient   Admission Date: 11/1/2024  Length of Stay: 2 days  Attending Physician: Callie Cherry MD  Primary Care Provider: Idalia Greco MD        Subjective:     Principal Problem:Severe sepsis        HPI:  84-year-old female with a past medical history of lung cancer actively getting chemo last of which was 5 days ago, who presented to the ER because of generalized weakness.  According to the patient, she woke up today, tried to get out of bed, but felt very weak, and slid beside the bed.  Did not hit her head, and did not lose consciousness.  She however could not get up however, and her  could not assist.  911 was called.  On arrival of EMS, patient was satting 88% on 4 L, and appeared to be short of breath.  She was brought to the ER for evaluation.    In the ER, vitals showed a blood pressure of 145/65, heart rate of 103, respiratory rate of 20, afebrile satting 60% on 4 L.  Immediately patient was placed on non-rebreather.  CBC with white count of 16.8, and macrocytic anemia.  CMP showed hypokalemia of 3.4.  First troponin is elevated at 46.  Point of care Lactic acid was 2.1.  Blood cultures were collected.  EKG showed sinus rhythm.  Chest x-ray showed no significant change of bilateral diffuse mixed interstitial and airspace lung opacities.  CTA chest was done, and it was negative for PE, but showed extensive interstitial and airspace opacities throughout both lungs, having significantly worsened in the left lung compared to prior CT. Unchanged small to moderate sized bilateral pleural effusions.  Cefepime was ordered, and patient will be admitted to Medicine for further care of her severe sepsis.    Overview/Hospital Course:  Ms. Otero has been monitored closely during her hospitalization. She was admitted on 11/1/2024 with acute on chronic hypoxemic resp failure.  CTA chest reveals extensive interstitial opacities c/w multifocal pna and/or ARDS. She requires high flow nasal cannula up to 15L now on 13L. Pulm and ID have been consulted. Pulm recommends broad spectrum abx for another day, she is currently on cefepime and doxycycline. She has stage IV lung ca and completed Carbo/Pemetrexed last year and is currently on pemetrexed (Alimta) maintenance with last dose 10/28. This can cause an interstitial pneumonitis and pulm has started prednisone. Plan for bronchoscopy on Monday if no improvement and recommended continuing eliquis. BiPAP qHS and naps. Duonebs Q6h. ID has ordered a respiratory infection panel that is negative. She's had abdominal bloating and discomfort for some time and had an outpt CT abd/pelvis with gastric wall thickening that is is nonspecific could be related to gastritis. She has a history of peptic ulcers and PCP started protonix/carafate. KUB on 11/2 with nonobstructive bowel gas pattern and moderate stool burden and pt was treated with laxative suppository. She has chronic macrocytic anemia that appears at baseline. She had a leukocytosis with left shift on admission that has trended down from 16K-->12K-->8K. CRP 36, procal 2.9. Pt had a BM on 11/2, but abd remained distended. She was found to be retaining a significant amount of urine on bladder scan >900 and priest was placed with 1500mL out with resolution of abd distention. Pulm increased steriods on 11/3 to IV solumedrol, she's requiring BiPAP support and was given IV lasix. Macrocytic anemia at baseline on admission but trending down. She will have a T&S in AM and may require transfusion. Dr. Cassidy will be consulted in the morning.    Interval History: almost 3L out in 24 hours. Continue on intermittent BiPAP, difficulty tolerating it overnight, could do vapotherm. Will consult pt's oncologist, Dr. Cassidy in AM. Continuing empiric coverage for multifocal pna. MRSA negative - will d/w ID to see  if vanc can be discontinued.     Review of Systems   Constitutional:  Positive for fatigue.   Respiratory:  Positive for cough and shortness of breath.    Gastrointestinal:  Positive for abdominal distention, constipation, nausea and vomiting.   Neurological:  Positive for weakness.     Objective:     Vital Signs (Most Recent):  Temp: 97.1 °F (36.2 °C) (11/03/24 1118)  Pulse: 74 (11/03/24 1359)  Resp: (!) 25 (11/03/24 1359)  BP: 126/63 (84) (11/03/24 1118)  SpO2: 99 % (titrated to 70%) (11/03/24 1400) Vital Signs (24h Range):  Temp:  [97 °F (36.1 °C)-97.7 °F (36.5 °C)] 97.1 °F (36.2 °C)  Pulse:  [72-91] 74  Resp:  [12-25] 25  SpO2:  [85 %-99 %] 99 %  BP: (107-174)/(60-84) 126/63     Weight: 49.5 kg (109 lb 2 oz)  Body mass index is 18.73 kg/m².    Intake/Output Summary (Last 24 hours) at 11/3/2024 1510  Last data filed at 11/3/2024 0900  Gross per 24 hour   Intake 420 ml   Output 3330 ml   Net -2910 ml         Physical Exam  Constitutional:       Comments: Chronically ill appearing   HENT:      Head: Normocephalic and atraumatic.      Nose: Nose normal.      Mouth/Throat:      Mouth: Mucous membranes are moist.      Pharynx: Oropharynx is clear.   Eyes:      Pupils: Pupils are equal, round, and reactive to light.   Cardiovascular:      Rate and Rhythm: Normal rate.   Pulmonary:      Effort: Pulmonary effort is normal.      Comments: Coarse and diminished throughout  Abdominal:      Comments: Abd is firm, mildly distended and nontender Bsx4 quadrants   Musculoskeletal:         General: Normal range of motion.      Cervical back: Normal range of motion and neck supple.   Skin:     General: Skin is warm.      Capillary Refill: Capillary refill takes 2 to 3 seconds.      Findings: Bruising present.   Neurological:      General: No focal deficit present.      Mental Status: She is alert and oriented to person, place, and time.   Psychiatric:         Mood and Affect: Mood normal.         Behavior: Behavior normal.              Significant Labs: All pertinent labs within the past 24 hours have been reviewed.  CBC:   Recent Labs   Lab 11/02/24  0433 11/03/24  0810   WBC 12.30 8.39   HGB 8.5* 7.7*   HCT 27.9* 24.9*    163     CMP:   Recent Labs   Lab 11/02/24  0433 11/03/24  0810    137   K 5.0 4.3   CL 98 96   CO2 31* 33*   * 146*   BUN 22 28*   CREATININE 1.0 1.1   CALCIUM 8.8 8.8   PROT 6.7 6.0   ALBUMIN 2.9* 2.7*   BILITOT 0.5 0.4   ALKPHOS 44* 50*   AST 16 13   ALT 8* 9*   ANIONGAP 9 8       TSH:   Recent Labs   Lab 10/07/24  1713   TSH 0.876     Urine Studies:   Recent Labs   Lab 11/01/24  2239   COLORU Yellow   APPEARANCEUA Clear   PHUR 7.0   SPECGRAV >1.030*   PROTEINUA 1+*   GLUCUA Trace*   KETONESU Negative   BILIRUBINUA Negative   OCCULTUA Negative   NITRITE Negative   UROBILINOGEN Negative   LEUKOCYTESUR Negative   RBCUA 3   WBCUA 1   BACTERIA Few*   SQUAMEPITHEL 3   HYALINECASTS 0       Significant Imaging: I have reviewed all pertinent imaging results/findings within the past 24 hours.    X-Ray KUB    Result Date: 11/2/2024  EXAMINATION: XR KUB CLINICAL HISTORY: abd pain/N/V; FINDINGS: Two abdominal radiographs at 11:05 hours compared to prior exams including CT 10/17/2024 show a nonobstructive bowel gas pattern.  There is scattered air in the colon, with moderate volume of stool in the distal colon and rectum.  No evidence of intraperitoneal free air. Rim calcification in the right upper quadrant is unchanged, with no suspicious calcifications overlying the kidneys or ureters.  There are multiple calcified pelvic phleboliths, with right upper quadrant cholecystectomy clips.  There is rightward convex lumbar spinal curvature, with intervertebral disc space narrowing and facet arthropathy in the spine. The bones are diffusely osteopenic.  Right hip hardware is incompletely visualized.  There are extensive interstitial and airspace opacities in the visualized lower lungs.     Nonobstructive bowel gas  pattern. Electronically signed by: Davide Jianglou Date:    11/02/2024 Time:    11:18    CTA Chest Non-Coronary (PE Studies)    Result Date: 11/1/2024  EXAMINATION: CTA CHEST NON CORONARY (PE STUDIES) CLINICAL HISTORY: Pulmonary embolism (PE) suspected, high prob; lung cancer TECHNIQUE: Thin axial imaging through the chest was performed with 100 mL Omnipaque 350 IV contrast, with sagittal and coronal reformatted images and MIP reconstructions performed, with images stored in the patient's permanent electronic medical record. FINDINGS: Comparison to multiple prior exams.  There are no pulmonary arterial filling defects to suggest pulmonary thromboembolism.  The central pulmonary arteries are normal in caliber. Diffuse calcified plaque involves normal-caliber thoracic aorta, with the heart enlarged and no pericardial effusion.  There are extensive coronary arterial calcifications, with dense mitral annular calcifications.  No enlarged mediastinal or hilar lymph nodes. There is unchanged volume loss in the right hemithorax, with extensive interstitial and airspace opacities throughout both lungs, in the left lung having significantly worsened compared to the 10/07/2024.  There are scattered lucencies reflecting pulmonary emphysema.  Small to moderate sized low-density bilateral pleural effusions are unchanged, with the central airways patent.  No pneumothorax. Images of the upper abdomen show stable hepatic hypodensity, cholecystectomy clips, and scattered colon diverticula.  There are no acute fractures or destructive osseous lesions.     1. Negative for pulmonary thromboembolism. 2. Extensive interstitial and airspace opacities throughout both lungs, having significantly worsened in the left lung compared to prior CT.  Multifocal pneumonia, drug reaction, and or developing ARDS could have this appearance. 3. Unchanged small to moderate sized bilateral pleural effusions. 4. Additional observations as described. .  "Electronically signed by: Davide Jeffrey Date:    11/01/2024 Time:    12:51    X-Ray Chest AP Portable    Result Date: 11/1/2024  EXAMINATION: XR CHEST AP PORTABLE CLINICAL HISTORY: Sepsis; FINDINGS: Portable chest with comparison chest x-ray 10/10/2024.  Normal cardiomediastinal silhouette.Bilateral diffuse mixed interstitial and airspace lung opacities are unchanged from previous exam.  Right subclavian Port-A-Cath is unchanged.  Pulmonary vasculature is normal. No acute osseous abnormality.     No significant change of bilateral diffuse mixed interstitial and airspace lung opacities. Electronically signed by: Kp Leiva Date:    11/01/2024 Time:    10:39        Assessment/Plan:      * Severe sepsis  Admitted to telemetry  This patient does have evidence of infective focus  My overall impression is sepsis.  Source: Respiratory  Antibiotics given-   Antibiotics (72h ago, onward)      Start     Stop Route Frequency Ordered    11/02/24 0200  ceFEPIme injection 1 g         -- IV Every 12 hours (non-standard times) 11/01/24 2114    11/01/24 1422  vancomycin - pharmacy to dose  (vancomycin IVPB (PEDS and ADULTS))        Placed in "And" Linked Group    -- IV pharmacy to manage frequency 11/01/24 1322    11/01/24 1330  doxycycline capsule 100 mg         -- Oral Every 12 hours 11/01/24 1322    11/01/24 1300  mupirocin 2 % ointment         11/06/24 0859 Nasl 2 times daily 11/01/24 1200          Latest lactate reviewed-  Recent Labs   Lab 11/01/24  0955 11/01/24  1442   LACTATE  --  0.7   POCLAC 2.17  --        - Organ dysfunction indicated by acute on chronic respiratory failure  - CTA chest showed finding of Extensive interstitial and airspace opacities throughout both lungs, having significantly worsened in the left lung compared to prior CT.  Multifocal pneumonia, drug reaction, and or developing ARDS could have this appearance.   - Patient was started on cefepime and vanc with doxycycline added by admitting  - Consult " "Pulmonary, and Infectious Disease.  - hold IV fluids given effusions seen on images.        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 Most recent hemoglobin and hematocrit are listed below.  Recent Labs     11/01/24  0950 11/02/24  0433 11/03/24  0810   HGB 8.7* 8.5* 7.7*   HCT 28.5* 27.9* 24.9*     Plan  - Monitor serial CBC: Daily  - Transfuse PRBC if patient becomes hemodynamically unstable, symptomatic or H/H drops below 7/21.  - Patient has not received any PRBC transfusions to date  - Patient's anemia is currently worsening. Will continue current treatment  - T&S in AM in the event she requires a transfusion    Acute on chronic hypoxic respiratory failure  Likely secondary to her bilateral extensive pneumonia.  On 4L NC at home  Currently on high flow nasal cannula and intermittent bipap  Pulm consulted   CTA chest ruled out PE.  Echo done last month showed normal systolic function.   Pneumonia vs. Pneumonitis? - pulm added prednisone initially and escalated to IV solumedrol for possible Pemetrexed induced pneumonitis   Amiodarone contributing?  IV lasix on 11/3      Multifocal pneumonia  Patient was started on cefepime, doxy for atypicals, and Vanc   Consult Pulmonary, and Infectious Disease.    Antibiotics (From admission, onward)      Start     Stop Route Frequency Ordered    11/02/24 1700  vancomycin 750 mg in dextrose 5 % 250 mL IVPB (ready to mix)         -- IV Every 24 hours (non-standard times) 11/02/24 1638    11/02/24 0200  ceFEPIme injection 1 g         -- IV Every 12 hours (non-standard times) 11/01/24 2114    11/01/24 1422  vancomycin - pharmacy to dose  (vancomycin IVPB (PEDS and ADULTS))        Placed in "And" Linked Group    -- IV pharmacy to manage frequency 11/01/24 " 1322    11/01/24 1330  doxycycline capsule 100 mg         -- Oral Every 12 hours 11/01/24 1322    11/01/24 1300  mupirocin 2 % ointment         11/06/24 0859 Nasl 2 times daily 11/01/24 1200            Microbiology Results (last 7 days)       Procedure Component Value Units Date/Time    Blood culture x two cultures. Draw prior to antibiotics. [[id number]] Collected: 11/01/24 1045    Order Status: Completed Specimen: Blood from Peripheral, Antecubital, Left Updated: 11/03/24 1032     Blood Culture, Routine No Growth to date      No Growth to date      No Growth to date    Narrative:      Aerobic and anaerobic    Blood culture x two cultures. Draw prior to antibiotics. [[id number]] Collected: 11/01/24 1051    Order Status: Completed Specimen: Blood from Peripheral, Antecubital, Right Updated: 11/03/24 1032     Blood Culture, Routine No Growth to date      No Growth to date      No Growth to date    Narrative:      Aerobic and anaerobic    MRSA Screen by PCR [[id number]] Collected: 11/02/24 1329    Order Status: Completed Specimen: Nasal Swab Updated: 11/02/24 1532     MRSA SCREEN BY PCR Negative    Respiratory Infection Panel (PCR), Nasopharyngeal [[id number]] Collected: 11/02/24 1329    Order Status: Completed Specimen: Nasopharyngeal Swab Updated: 11/02/24 1509     Respiratory Infection Panel Source NP swab     Adenovirus Not Detected     Coronavirus 229E, Common Cold Virus Not Detected     Coronavirus HKU1, Common Cold Virus Not Detected     Coronavirus NL63, Common Cold Virus Not Detected     Coronavirus OC43, Common Cold Virus Not Detected     Comment: Coronavirus strains 229E, HKU1, NL63, and OC43 can cause the common   cold   and are not associated with the respiratory disease outbreak caused   by  the COVID-19 (SARS-CoV-2 novel Coronavirus) strain.           SARS-CoV2 (COVID-19) Qualitative PCR Not Detected     Human Metapneumovirus Not Detected     Human Rhinovirus/Enterovirus Not Detected     Influenza A  (subtypes H1, H1-2009,H3) Not Detected     Influenza B Not Detected     Parainfluenza Virus 1 Not Detected     Parainfluenza Virus 2 Not Detected     Parainfluenza Virus 3 Not Detected     Parainfluenza Virus 4 Not Detected     Respiratory Syncytial Virus Not Detected     Bordetella Parapertussis ([id number]) Not Detected     Bordetella pertussis (ptxP) Not Detected     Chlamydia pneumoniae Not Detected     Mycoplasma pneumoniae Not Detected     Comment: Respiratory Infection Panel testing performed by Multiplex PCR.       Narrative:      Respiratory Infection Panel source->NP Swab    Culture, Respiratory with Gram Stain [[id number]]     Order Status: Sent Specimen: Respiratory from Sputum, Expectorated             Pleural effusion  Bilateral, stable.   Pulm consulted  In the setting of lung ca      Malignant neoplasm of lower lobe of right lung  Carbo/Pemetrexed until 4/2024 now on Pemetrexed maintenance with last dose 10/28/24  Consult pt's oncologist, Dr. Cassidy, in AM      Atrial fibrillation  Patient has paroxysmal (<7 days) atrial fibrillation. Patient is currently in sinus rhythm. [id number] Score: 5. The patients heart rate in the last 24 hours is as follows:  Pulse  Min: 72  Max: 91     Antiarrhythmics  Amiodarone and metoprolol    Anticoagulants  Eliquis    Plan  - Replete lytes with a goal of K>4, Mg >2  - Patient is anticoagulated, see medications listed above.  - Patient's afib is currently controlled      VTE Risk Mitigation (From admission, onward)           Ordered     apixaban tablet 2.5 mg  2 times daily         11/01/24 1316     IP VTE HIGH RISK PATIENT  Once         11/01/24 1158     Place sequential compression device  Until discontinued         11/01/24 1158                    Discharge Planning   ALYCIA: 11/6/2024     Code Status: DNR   Is the patient medically ready for discharge?:     Reason for patient still in hospital (select all that apply): Patient trending condition, Treatment, and Consult  recommendations  Discharge Plan A: Home Health                  Gila Carranza NP  Department of Hospital Medicine   [facility]

## 2024-11-03 NOTE — RESPIRATORY THERAPY
11/02/24 2010   Patient Assessment/Suction   Level of Consciousness (AVPU) alert   Respiratory Effort Normal;Unlabored   Expansion/Accessory Muscles/Retractions no retractions;no use of accessory muscles   All Lung Fields Breath Sounds coarse   Rhythm/Pattern, Respiratory pattern regular;unlabored   Cough Frequency no cough   PRE-TX-O2   Device (Oxygen Therapy) high flow nasal cannula   Flow (L/min) (Oxygen Therapy) 15   SpO2 (!) 91 %   Pulse Oximetry Type Intermittent   $ Pulse Oximetry - Multiple Charge Pulse Oximetry - Multiple   Pulse 84   Resp 16   Aerosol Therapy   $ Aerosol Therapy Charges Aerosol Treatment   Daily Review of Necessity (SVN) completed   Respiratory Treatment Status (SVN) given   Treatment Route (SVN) mask;oxygen   Patient Position HOB elevated   Post Treatment Assessment (SVN) breath sounds unchanged   Signs of Intolerance (SVN) none   Education   $ Education Bronchodilator;Oxygen;15 min

## 2024-11-03 NOTE — ASSESSMENT & PLAN NOTE
Carbo/Pemetrexed until 4/2024 now on Pemetrexed maintenance with last dose 10/28/24  Consult pt's oncologist, Dr. Cassidy, in AM

## 2024-11-03 NOTE — ASSESSMENT & PLAN NOTE
"Patient was started on cefepime, doxy for atypicals, and Vanc   Consult Pulmonary, and Infectious Disease.    Antibiotics (From admission, onward)      Start     Stop Route Frequency Ordered    11/02/24 1700  vancomycin 750 mg in dextrose 5 % 250 mL IVPB (ready to mix)         -- IV Every 24 hours (non-standard times) 11/02/24 1638    11/02/24 0200  ceFEPIme injection 1 g         -- IV Every 12 hours (non-standard times) 11/01/24 2114    11/01/24 1422  vancomycin - pharmacy to dose  (vancomycin IVPB (PEDS and ADULTS))        Placed in "And" Linked Group    -- IV pharmacy to manage frequency 11/01/24 1322    11/01/24 1330  doxycycline capsule 100 mg         -- Oral Every 12 hours 11/01/24 1322    11/01/24 1300  mupirocin 2 % ointment         11/06/24 0859 Nasl 2 times daily 11/01/24 1200            Microbiology Results (last 7 days)       Procedure Component Value Units Date/Time    Blood culture x two cultures. Draw prior to antibiotics. [[id number]] Collected: 11/01/24 1045    Order Status: Completed Specimen: Blood from Peripheral, Antecubital, Left Updated: 11/03/24 1032     Blood Culture, Routine No Growth to date      No Growth to date      No Growth to date    Narrative:      Aerobic and anaerobic    Blood culture x two cultures. Draw prior to antibiotics. [[id number]] Collected: 11/01/24 1051    Order Status: Completed Specimen: Blood from Peripheral, Antecubital, Right Updated: 11/03/24 1032     Blood Culture, Routine No Growth to date      No Growth to date      No Growth to date    Narrative:      Aerobic and anaerobic    MRSA Screen by PCR [[id number]] Collected: 11/02/24 1329    Order Status: Completed Specimen: Nasal Swab Updated: 11/02/24 1532     MRSA SCREEN BY PCR Negative    Respiratory Infection Panel (PCR), Nasopharyngeal [[id number]] Collected: 11/02/24 1329    Order Status: Completed Specimen: Nasopharyngeal Swab Updated: 11/02/24 1509     Respiratory Infection Panel Source NP swab     " Adenovirus Not Detected     Coronavirus 229E, Common Cold Virus Not Detected     Coronavirus HKU1, Common Cold Virus Not Detected     Coronavirus NL63, Common Cold Virus Not Detected     Coronavirus OC43, Common Cold Virus Not Detected     Comment: Coronavirus strains 229E, HKU1, NL63, and OC43 can cause the common   cold   and are not associated with the respiratory disease outbreak caused   by  the COVID-19 (SARS-CoV-2 novel Coronavirus) strain.           SARS-CoV2 (COVID-19) Qualitative PCR Not Detected     Human Metapneumovirus Not Detected     Human Rhinovirus/Enterovirus Not Detected     Influenza A (subtypes H1, H1-2009,H3) Not Detected     Influenza B Not Detected     Parainfluenza Virus 1 Not Detected     Parainfluenza Virus 2 Not Detected     Parainfluenza Virus 3 Not Detected     Parainfluenza Virus 4 Not Detected     Respiratory Syncytial Virus Not Detected     Bordetella Parapertussis ([id number]) Not Detected     Bordetella pertussis (ptxP) Not Detected     Chlamydia pneumoniae Not Detected     Mycoplasma pneumoniae Not Detected     Comment: Respiratory Infection Panel testing performed by Multiplex PCR.       Narrative:      Respiratory Infection Panel source->NP Swab    Culture, Respiratory with Gram Stain [[id number]]     Order Status: Sent Specimen: Respiratory from Sputum, Expectorated

## 2024-11-03 NOTE — SUBJECTIVE & OBJECTIVE
Interval History: almost 3L out in 24 hours. Continue on intermittent BiPAP, difficulty tolerating it overnight, could do vapotherm. Will consult pt's oncologist, Dr. Cassidy in AM. Continuing empiric coverage for multifocal pna. MRSA negative - will d/w ID to see if vanc can be discontinued.     Review of Systems   Constitutional:  Positive for fatigue.   Respiratory:  Positive for cough and shortness of breath.    Gastrointestinal:  Positive for abdominal distention, constipation, nausea and vomiting.   Neurological:  Positive for weakness.     Objective:     Vital Signs (Most Recent):  Temp: 97.1 °F (36.2 °C) (11/03/24 1118)  Pulse: 74 (11/03/24 1359)  Resp: (!) 25 (11/03/24 1359)  BP: 126/63 (84) (11/03/24 1118)  SpO2: 99 % (titrated to 70%) (11/03/24 1400) Vital Signs (24h Range):  Temp:  [97 °F (36.1 °C)-97.7 °F (36.5 °C)] 97.1 °F (36.2 °C)  Pulse:  [72-91] 74  Resp:  [12-25] 25  SpO2:  [85 %-99 %] 99 %  BP: (107-174)/(60-84) 126/63     Weight: 49.5 kg (109 lb 2 oz)  Body mass index is 18.73 kg/m².    Intake/Output Summary (Last 24 hours) at 11/3/2024 1510  Last data filed at 11/3/2024 0900  Gross per 24 hour   Intake 420 ml   Output 3330 ml   Net -2910 ml         Physical Exam  Constitutional:       Comments: Chronically ill appearing   HENT:      Head: Normocephalic and atraumatic.      Nose: Nose normal.      Mouth/Throat:      Mouth: Mucous membranes are moist.      Pharynx: Oropharynx is clear.   Eyes:      Pupils: Pupils are equal, round, and reactive to light.   Cardiovascular:      Rate and Rhythm: Normal rate.   Pulmonary:      Effort: Pulmonary effort is normal.      Comments: Coarse and diminished throughout  Abdominal:      Comments: Abd is firm, mildly distended and nontender Bsx4 quadrants   Musculoskeletal:         General: Normal range of motion.      Cervical back: Normal range of motion and neck supple.   Skin:     General: Skin is warm.      Capillary Refill: Capillary refill takes 2 to 3  seconds.      Findings: Bruising present.   Neurological:      General: No focal deficit present.      Mental Status: She is alert and oriented to person, place, and time.   Psychiatric:         Mood and Affect: Mood normal.         Behavior: Behavior normal.             Significant Labs: All pertinent labs within the past 24 hours have been reviewed.  CBC:   Recent Labs   Lab 11/02/24  0433 11/03/24  0810   WBC 12.30 8.39   HGB 8.5* 7.7*   HCT 27.9* 24.9*    163     CMP:   Recent Labs   Lab 11/02/24  0433 11/03/24  0810    137   K 5.0 4.3   CL 98 96   CO2 31* 33*   * 146*   BUN 22 28*   CREATININE 1.0 1.1   CALCIUM 8.8 8.8   PROT 6.7 6.0   ALBUMIN 2.9* 2.7*   BILITOT 0.5 0.4   ALKPHOS 44* 50*   AST 16 13   ALT 8* 9*   ANIONGAP 9 8       TSH:   Recent Labs   Lab 10/07/24  1713   TSH 0.876     Urine Studies:   Recent Labs   Lab 11/01/24  2239   COLORU Yellow   APPEARANCEUA Clear   PHUR 7.0   SPECGRAV >1.030*   PROTEINUA 1+*   GLUCUA Trace*   KETONESU Negative   BILIRUBINUA Negative   OCCULTUA Negative   NITRITE Negative   UROBILINOGEN Negative   LEUKOCYTESUR Negative   RBCUA 3   WBCUA 1   BACTERIA Few*   SQUAMEPITHEL 3   HYALINECASTS 0       Significant Imaging: I have reviewed all pertinent imaging results/findings within the past 24 hours.    X-Ray KUB    Result Date: 11/2/2024  EXAMINATION: XR KUB CLINICAL HISTORY: abd pain/N/V; FINDINGS: Two abdominal radiographs at 11:05 hours compared to prior exams including CT 10/17/2024 show a nonobstructive bowel gas pattern.  There is scattered air in the colon, with moderate volume of stool in the distal colon and rectum.  No evidence of intraperitoneal free air. Rim calcification in the right upper quadrant is unchanged, with no suspicious calcifications overlying the kidneys or ureters.  There are multiple calcified pelvic phleboliths, with right upper quadrant cholecystectomy clips.  There is rightward convex lumbar spinal curvature, with  intervertebral disc space narrowing and facet arthropathy in the spine. The bones are diffusely osteopenic.  Right hip hardware is incompletely visualized.  There are extensive interstitial and airspace opacities in the visualized lower lungs.     Nonobstructive bowel gas pattern. Electronically signed by: Davide Jeffrey Date:    11/02/2024 Time:    11:18    CTA Chest Non-Coronary (PE Studies)    Result Date: 11/1/2024  EXAMINATION: CTA CHEST NON CORONARY (PE STUDIES) CLINICAL HISTORY: Pulmonary embolism (PE) suspected, high prob; lung cancer TECHNIQUE: Thin axial imaging through the chest was performed with 100 mL Omnipaque 350 IV contrast, with sagittal and coronal reformatted images and MIP reconstructions performed, with images stored in the patient's permanent electronic medical record. FINDINGS: Comparison to multiple prior exams.  There are no pulmonary arterial filling defects to suggest pulmonary thromboembolism.  The central pulmonary arteries are normal in caliber. Diffuse calcified plaque involves normal-caliber thoracic aorta, with the heart enlarged and no pericardial effusion.  There are extensive coronary arterial calcifications, with dense mitral annular calcifications.  No enlarged mediastinal or hilar lymph nodes. There is unchanged volume loss in the right hemithorax, with extensive interstitial and airspace opacities throughout both lungs, in the left lung having significantly worsened compared to the 10/07/2024.  There are scattered lucencies reflecting pulmonary emphysema.  Small to moderate sized low-density bilateral pleural effusions are unchanged, with the central airways patent.  No pneumothorax. Images of the upper abdomen show stable hepatic hypodensity, cholecystectomy clips, and scattered colon diverticula.  There are no acute fractures or destructive osseous lesions.     1. Negative for pulmonary thromboembolism. 2. Extensive interstitial and airspace opacities throughout both lungs,  having significantly worsened in the left lung compared to prior CT.  Multifocal pneumonia, drug reaction, and or developing ARDS could have this appearance. 3. Unchanged small to moderate sized bilateral pleural effusions. 4. Additional observations as described. . Electronically signed by: Davide Jeffrey Date:    11/01/2024 Time:    12:51    X-Ray Chest AP Portable    Result Date: 11/1/2024  EXAMINATION: XR CHEST AP PORTABLE CLINICAL HISTORY: Sepsis; FINDINGS: Portable chest with comparison chest x-ray 10/10/2024.  Normal cardiomediastinal silhouette.Bilateral diffuse mixed interstitial and airspace lung opacities are unchanged from previous exam.  Right subclavian Port-A-Cath is unchanged.  Pulmonary vasculature is normal. No acute osseous abnormality.     No significant change of bilateral diffuse mixed interstitial and airspace lung opacities. Electronically signed by: Kp Leiva Date:    11/01/2024 Time:    10:39

## 2024-11-03 NOTE — ASSESSMENT & PLAN NOTE
Patient has paroxysmal (<7 days) atrial fibrillation. Patient is currently in sinus rhythm. [id number] Score: 5. The patients heart rate in the last 24 hours is as follows:  Pulse  Min: 72  Max: 91     Antiarrhythmics  Amiodarone and metoprolol    Anticoagulants  Eliquis    Plan  - Replete lytes with a goal of K>4, Mg >2  - Patient is anticoagulated, see medications listed above.  - Patient's afib is currently controlled

## 2024-11-04 PROBLEM — A41.9 SEVERE SEPSIS: Status: RESOLVED | Noted: 2024-01-01 | Resolved: 2024-01-01

## 2024-11-04 PROBLEM — R65.20 SEVERE SEPSIS: Status: RESOLVED | Noted: 2024-01-01 | Resolved: 2024-01-01

## 2024-11-04 NOTE — ASSESSMENT & PLAN NOTE
"Admitted to telemetry  This patient does have evidence of infective focus  My overall impression is sepsis.  Source: Respiratory  Antibiotics given-   Antibiotics (72h ago, onward)      Start     Stop Route Frequency Ordered    11/02/24 0200  ceFEPIme injection 1 g         -- IV Every 12 hours (non-standard times) 11/01/24 2114    11/01/24 1330  doxycycline capsule 100 mg         -- Oral Every 12 hours 11/01/24 1322    11/01/24 1300  mupirocin 2 % ointment         11/06/24 0859 Nasl 2 times daily 11/01/24 1200          Latest lactate reviewed-  No results for input(s): "LACTATE", "POCLAC" in the last 72 hours.    - Organ dysfunction indicated by acute on chronic respiratory failure  - CTA chest showed finding of Extensive interstitial and airspace opacities throughout both lungs, having significantly worsened in the left lung compared to prior CT.  Multifocal pneumonia, drug reaction, and or developing ARDS could have this appearance.   - Patient was started on cefepime and vanc with doxycycline added by admitting, vanc d/c'ed with negative MRSA screen  - Consult Pulmonary, and Infectious Disease.  - hold IV fluids given effusions seen on images.      "

## 2024-11-04 NOTE — PT/OT/SLP EVAL
Physical Therapy Evaluation    Patient Name:  Alexa Otero   MRN:  [id number]    Recommendations:     Discharge Recommendations: Moderate Intensity Therapy (vs Low w/ 24/7 assist (pending progress/oxygen requirements))   Discharge Equipment Recommendations: walker, rolling   Barriers to discharge: Decreased caregiver support as pt currently requiring assist of 1-2 persons for bed mobility/transfers on 15L O2 with SPO2 dropping.     Assessment:     Alexa Otero is a 84 y.o. female admitted with a medical diagnosis of Severe sepsis.  She presents with the following impairments/functional limitations: weakness, impaired endurance, impaired self care skills, impaired functional mobility, gait instability, impaired balance, impaired cardiopulmonary response to activity.    Pt found HOB elevated on 15Lpm O2 by nc with daughter present and pt agreeable to working with PT. Pt A & O x  4 and has the following co-morbidities: Lung CA receiving chemotherapy, A-Fib, pna, anemia, urinary retention.  Pt tolerated session fairly due to SPO2 dropping with the work of sitting up EOB and required moderate to minimum assistance of 1-2 for safe mobilization during session today. Pt would benefit from acute PT during hospitalization to increase strength, endurance and safety with mobility.  The pt currently demonstrates a need for Moderate Intensity Therapy on a daily basis secondary to decline in functional status due to illness and currently requiring 15Lpm O2 and intermittent use of BiPap.         Rehab Prognosis: Fair; patient would benefit from acute skilled PT services to address these deficits and reach maximum level of function.    Recent Surgery: * No surgery found *      Plan:     During this hospitalization, patient to be seen 5 x/week to address the identified rehab impairments via gait training, therapeutic activities, therapeutic exercises and progress toward the following goals:    Plan of Care Expires:   "12/02/24    Subjective     Chief Complaint: Pt reported "I did not hurt myself" regarding sliding out of bed.  Patient/Family Comments/goals: return to prior level of functional mobility.  Pain/Comfort:  Pain Rating 1: 0/10  Pain Rating Post-Intervention 1: 0/10    Patients cultural, spiritual, Zoroastrianism conflicts given the current situation:      Living Environment:  Pt lives with her  in a [facility] w/ [employer].  Prior to admission, patients level of function was independent to MI with rolling walker until having chemo last week.  Equipment used at home: oxygen (the rolling walker at pt's house is her 's.).  DME owned (not currently used): wheelchair.  Upon discharge, patient will have assistance from her family.    Objective:     Communicated with RODO Hirsch prior to session.  Patient found HOB elevated with bed alarm, priest catheter, oxygen, peripheral IV, telemetry  upon PT entry to room.    General Precautions: Standard, fall  Orthopedic Precautions:N/A   Braces: N/A  Respiratory Status: High flow, flow 15 L/min, concentration  %    Exams:  Cognitive Exam:  Patient is oriented to Person, Place, Time, and Situation  RLE ROM: WFL  RLE Strength: grossly 4-/5  LLE ROM: WFL  LLE Strength: grossly 4-/5    Functional Mobility:  Bed Mobility:     Scooting: minimum assistance and in sitting, maximal assistance of 2 persons in supine  Supine to Sit: moderate assistance and of 1-2 persons; pt sat EOB several minutes with SPO2 fluctuating and with vc/ed with pt & daughter re: need for PLB in order to decrease CO2 levels  Sit to Supine: moderate assistance and of 2 persons; Pt's SPO2 at end of session 91%   Transfers:     Sit to Stand:  minimum assistance and moderate assistance with hand-held assist  Gait: x 3-4 side steps with HHA and moderate assistance for balance      AM-PAC 6 CLICK MOBILITY  Total Score:15       Treatment & Education:  Pt was educated on the following: call light use, importance of OOB activity " and functional mobility to negate the negative effects of prolonged bed rest during this hospitalization, safe transfers/ambulation, need for/proper technique for PLB and discharge planning recommendations/options.      Patient left HOB elevated with all lines intact, call button in reach, bed alarm on and pt's daughter present.    GOALS:   Multidisciplinary Problems       Physical Therapy Goals          Problem: Physical Therapy    Goal Priority Disciplines Outcome Interventions   Physical Therapy Goal     PT, PT/OT     Description: Goals to be met by: 24     Patient will increase functional independence with mobility by performin. Supine to sit with Supervision  2. Sit to stand transfer with Supervision  3. Bed to chair transfer with Supervision using Rolling Walker  4. Gait  x 100 feet with Supervision using Rolling Walker.                              History:     Past Medical History:   Diagnosis Date    Arthritis     Cardiac arrest 10/2023    Cataract     Chronic obstructive pulmonary disease, unspecified COPD type 2024    Colon polyp     Coronary artery disease 2024    Diabetes mellitus type II     Encounter for blood transfusion     Extra-ovarian endometrioid adenocarcinoma     GERD (gastroesophageal reflux disease)     History of chronic cough     clear productive    Hyperlipidemia     Hypertension     Macular degeneration     Ovarian cancer     PONV (postoperative nausea and vomiting)     Squamous cell carcinoma     precancer of cervix       Past Surgical History:   Procedure Laterality Date    AUGMENTATION OF BREAST      BRONCHOSCOPY N/A 2023    Procedure: BRONCHOSCOPY;  Surgeon: Neva Christian MD;  Location: [facility];  Service: ENT;  Laterality: N/A;    BRONCHOSCOPY WITH FLUOROSCOPY Right 2023    Procedure: BRONCHOSCOPY, WITH FLUOROSCOPY;  Surgeon: Neva Christian MD;  Location: [facility] ENDO;  Service: Pulmonary;  Laterality: Right;    BRONCHOSCOPY  WITH FLUOROSCOPY N/A 12/15/2023    Procedure: BRONCHOSCOPY, WITH FLUOROSCOPY;  Surgeon: Neva Christian MD;  Location: [facility] ENDO;  Service: Pulmonary;  Laterality: N/A;    CATARACT EXTRACTION BILATERAL W/ ANTERIOR VITRECTOMY Bilateral     CHOLECYSTECTOMY      COLONOSCOPY      COLONOSCOPY N/A 04/20/2023    Procedure: COLONOSCOPY;  Surgeon: Sabino Stephenson MD;  Location: [facility] ENDO;  Service: Endoscopy;  Laterality: N/A;    CORONARY STENT PLACEMENT  10/2023    x 3    ESOPHAGOGASTRODUODENOSCOPY N/A 06/20/2018    Procedure: EGD (ESOPHAGOGASTRODUODENOSCOPY);  Surgeon: Sabino Stephenson MD;  Location: [facility] ENDO;  Service: Endoscopy;  Laterality: N/A;    ESOPHAGOGASTRODUODENOSCOPY N/A 03/31/2023    Procedure: EGD (ESOPHAGOGASTRODUODENOSCOPY);  Surgeon: Sabino Stephenson MD;  Location: [facility] ENDO;  Service: Endoscopy;  Laterality: N/A;    ESOPHAGOGASTRODUODENOSCOPY N/A 12/11/2023    Procedure: EGD (ESOPHAGOGASTRODUODENOSCOPY);  Surgeon: Pretty Salgado MD;  Location: [facility];  Service: Endoscopy;  Laterality: N/A;    HYSTERECTOMY  1976    partial    INJECTION OF ANESTHETIC AGENT AROUND MEDIAL BRANCH NERVES INNERVATING LUMBAR FACET JOINT Right 05/10/2023    Procedure: Block-nerve-Lateral-branch-lumbar;  Surgeon: Agustin Robles MD;  Location: [facility];  Service: Pain Management;  Laterality: Right;  L5 and s1,s2 LBB    INJECTION OF ANESTHETIC AGENT AROUND MEDIAL BRANCH NERVES INNERVATING LUMBAR FACET JOINT Right 05/30/2023    Procedure: Block-nerve-medial branch-lumbar;  Surgeon: Agustin Robles MD;  Location: [facility];  Service: Pain Management;  Laterality: Right;  L5, s1 ,s2 LBB #2    INJECTION OF ANESTHETIC AGENT AROUND NERVE Right 10/31/2023    Procedure: INTERCOSTAL NERVE BLOCK;  Surgeon: Washington Shankar MD;  Location: [facility];  Service: Cardiothoracic;  Laterality: Right;    INJECTION, SACROILIAC JOINT Right 01/26/2023    Procedure: INJECTION,SACROILIAC JOINT;  Surgeon: Agustin Robles MD;  Location: [facility] OR;  Service:  Pain Management;  Laterality: Right;    INSERTION OF TUNNELED CENTRAL VENOUS CATHETER (CVC) WITH SUBCUTANEOUS PORT Right 10/19/2020    Procedure: INSERTION, PORT-A-CATH;  Surgeon: Misti Mcfarland MD;  Location: [facility] OR;  Service: General;  Laterality: Right;    INTRAMEDULLARY RODDING OF TROCHANTER OF FEMUR Right 11/28/2021    Procedure: INSERTION, INTRAMEDULLARY LUC, FEMUR, TROCHANTER/RIGHT TFN DR FAJARDO NOTIFIED REP;  Surgeon: Kp Fajardo MD;  Location: [facility] OR;  Service: Orthopedics;  Laterality: Right;  SKIP    ROBOT-ASSISTED LAPAROSCOPIC LYMPHADENECTOMY USING DA NELLA XI N/A 09/21/2020    Procedure: XI ROBOTIC LYMPHADENECTOMY-pelvic and kell-aortic;  Surgeon: Altagracia Ray MD;  Location: [facility] OR;  Service: OB/GYN;  Laterality: N/A;    ROBOT-ASSISTED LAPAROSCOPIC OMENTECTOMY USING DA NELLA XI N/A 09/21/2020    Procedure: XI ROBOTIC OMENTECTOMY;  Surgeon: Altagracia Ray MD;  Location: [facility] OR;  Service: OB/GYN;  Laterality: N/A;    ROBOT-ASSISTED LAPAROSCOPIC SALPINGO-OOPHORECTOMY USING DA NELLA XI Bilateral 09/21/2020    Procedure: XI ROBOTIC SALPINGO-OOPHORECTOMY;  Surgeon: Altagracia Ray MD;  Location: [facility] OR;  Service: OB/GYN;  Laterality: Bilateral;    THORACOSCOPIC BIOPSY OF PLEURA Right 10/31/2023    Procedure: VATS, WITH PLEURA BIOPSY;  Surgeon: Washington Shankar MD;  Location: [facility];  Service: Cardiothoracic;  Laterality: Right;  FROZEN SECTION   **KRISTEN-ASSISDT**    THORACOTOMY Right 10/31/2023    Procedure: THORACOTOMY;  Surgeon: Washington Shankar MD;  Location: [facility] OR;  Service: Cardiothoracic;  Laterality: Right;    UPPER GASTROINTESTINAL ENDOSCOPY         Time Tracking:     PT Received On: 11/04/24  PT Start Time: 1344     PT Stop Time: 1407  PT Total Time (min): 23 min     Billable Minutes: Evaluation 10 and Therapeutic Activity 13      11/04/2024

## 2024-11-04 NOTE — PLAN OF CARE
Problem: Adult Inpatient Plan of Care  Goal: Plan of Care Review  Outcome: Progressing  Goal: Patient-Specific Goal (Individualized)  Outcome: Progressing  Goal: Absence of Hospital-Acquired Illness or Injury  Outcome: Progressing  Goal: Optimal Comfort and Wellbeing  Outcome: Progressing  Goal: Readiness for Transition of Care  Outcome: Progressing     Problem: Sepsis/Septic Shock  Goal: Optimal Coping  Outcome: Progressing  Goal: Absence of Bleeding  Outcome: Progressing  Goal: Blood Glucose Level Within Targeted Range  Outcome: Progressing  Goal: Absence of Infection Signs and Symptoms  Outcome: Progressing  Goal: Optimal Nutrition Intake  Outcome: Progressing     Problem: Pneumonia  Goal: Fluid Balance  Outcome: Progressing  Goal: Resolution of Infection Signs and Symptoms  Outcome: Progressing  Goal: Effective Oxygenation and Ventilation  Outcome: Progressing     Problem: Fall Injury Risk  Goal: Absence of Fall and Fall-Related Injury  Outcome: Progressing     Problem: Skin Injury Risk Increased  Goal: Skin Health and Integrity  Outcome: Progressing     Problem: Infection  Goal: Absence of Infection Signs and Symptoms  Outcome: Progressing     Problem: Gas Exchange Impaired  Goal: Optimal Gas Exchange  Outcome: Progressing

## 2024-11-04 NOTE — PROGRESS NOTES
Pulmonary/Critical Care Consult      PATIENT NAME: Alexa Otero  MRN: [id number]  TODAY'S DATE: 2024  11:55 AM  ADMIT DATE: 2024  AGE: 84 y.o. 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    She reports progressive fatigue weakness prior to admission to the hospital.  She reports that she slid out of her bed and that is the reason she came to the hospital.  She reports that she has not had any fever cough night sweats, but she has some worsening shortness of breath.    11/3   - rough night, had worsening shortness of breath and is required non-rebreather in addition no nasal cannula currently she is on high-flow at 10 liters/minute     the patient is feeling a little better today.  She is on 15 L high-flow nasal cannula.  Her CT shows her chronically infiltrated right lung with the surrounding effusion.  Her left lung which was normal looking 2 weeks ago now is diffusely infiltrated with ground-glass opacities.  This must be the Alimta.    Review of Systems   Constitutional:  Positive for appetite change and fatigue. Negative for chills, fever and unexpected weight change.   HENT:  Negative for nosebleeds, postnasal drip, trouble swallowing and voice change.    Eyes:  Negative for visual disturbance.   Respiratory:  Positive for shortness of breath. Negative for cough and wheezing.    Cardiovascular:  Negative for chest pain and leg swelling.   Gastrointestinal:  Negative for diarrhea, nausea and vomiting.   Endocrine: Negative for cold intolerance and heat intolerance.   Genitourinary:  Negative for dysuria, flank  pain and frequency (.jppe).   Musculoskeletal:  Negative for neck pain and neck stiffness.   Skin:  Positive for wound.   Allergic/Immunologic: Negative for environmental allergies and immunocompromised state.   Neurological:  Negative for syncope, speech difficulty and weakness.   Hematological:  Negative for adenopathy.   Psychiatric/Behavioral:  Negative for confusion.          No change in the patient's Past Medical History, Past Surgical History, Social History or Family History since admission.    VITAL SIGNS (MOST RECENT)  Temp: (P) 97.1 °F (36.2 °C) (11/04/24 1223)  Pulse: (P) 69 (11/04/24 1223)  Resp: (P) 16 (11/04/24 1223)  BP: (!) 130/54 (11/04/24 0858)  SpO2: (P) 98 % (11/04/24 1223)    INTAKE AND OUTPUT (LAST 24 HOURS):  Intake/Output Summary (Last 24 hours) at 11/4/2024 1256  Last data filed at 11/4/2024 1235  Gross per 24 hour   Intake 60 ml   Output 1850 ml   Net -1790 ml       WEIGHT  Wt Readings from Last 1 Encounters:   11/01/24 49.5 kg (109 lb 2 oz)         PHYSICAL EXAM  GENERAL: Older patient in no distress, looking frail.  HEENT: Pupils equal and reactive. Extraocular movements intact. Nose intact.  Pharynx moist.  NECK: Supple.   HEART: Regular rate and rhythm. No murmur or gallop auscultated.  LUNGS:  There are diminished breath sounds on the right.  On the left there are diffuse crackles.  Lung excursion symmetrical. No change in fremitus.  There is a port in thorax.  ABDOMEN: Bowel sounds present. Non-tender, no masses palpated.  : Normal anatomy.  EXTREMITIES: Normal muscle tone and joint movement, no cyanosis or clubbing.   LYMPHATICS: No adenopathy palpated, no edema.  SKIN: Dry, intact, no lesions.  There is a stage I decubitus on the buttock.    NEURO: Cranial nerves II-XII intact. Motor strength 5/5 bilaterally, upper and lower extremities.  PSYCH: Appropriate affect.        CBC LAST (LAST 24 HOURS)  Recent Labs   Lab 11/04/24  0627   WBC 3.85*   RBC 2.40*   HGB 7.6*   HCT 24.9*    *   MCH 31.7*   MCHC 30.5*   RDW 18.1*   *   MPV 10.1   GRAN 87.0*  3.4   LYMPH 5.5*  0.2*   MONO 4.4  0.2*   BASO 0.00   NRBC 0       CHEMISTRY LAST (LAST 24 HOURS)  Recent Labs   Lab 11/04/24  0627   *   K 4.4   CL 92*   CO2 33*   ANIONGAP 7*   BUN 37*   CREATININE 1.4   *   CALCIUM 9.3   MG 2.2   ALBUMIN 3.0*   PROT 6.4   ALKPHOS 57   ALT 10   AST 13   BILITOT 0.4         CARDIAC PROFILE (LAST 24 HOURS)  Recent Labs   Lab 11/01/24  0950 11/02/24  1335   *  --    LDH  --  413*   TROPONINIHS 46.9*  --        LAST 7 DAYS MICROBIOLOGY   Microbiology Results (last 7 days)       Procedure Component Value Units Date/Time    Blood culture x two cultures. Draw prior to antibiotics. [[id number]] Collected: 11/01/24 1051    Order Status: Completed Specimen: Blood from Peripheral, Antecubital, Right Updated: 11/04/24 1032     Blood Culture, Routine No Growth to date      No Growth to date      No Growth to date      No Growth to date    Narrative:      Aerobic and anaerobic    Blood culture x two cultures. Draw prior to antibiotics. [[id number]] Collected: 11/01/24 1045    Order Status: Completed Specimen: Blood from Peripheral, Antecubital, Left Updated: 11/04/24 1032     Blood Culture, Routine No Growth to date      No Growth to date      No Growth to date      No Growth to date    Narrative:      Aerobic and anaerobic    MRSA Screen by PCR [[id number]] Collected: 11/02/24 1329    Order Status: Completed Specimen: Nasal Swab Updated: 11/02/24 1532     MRSA SCREEN BY PCR Negative    Respiratory Infection Panel (PCR), Nasopharyngeal [[id number]] Collected: 11/02/24 1329    Order Status: Completed Specimen: Nasopharyngeal Swab Updated: 11/02/24 1509     Respiratory Infection Panel Source NP swab     Adenovirus Not Detected     Coronavirus 229E, Common Cold Virus Not Detected     Coronavirus HKU1, Common Cold Virus Not Detected     Coronavirus NL63, Common Cold Virus Not Detected      Coronavirus OC43, Common Cold Virus Not Detected     Comment: Coronavirus strains 229E, HKU1, NL63, and OC43 can cause the common   cold   and are not associated with the respiratory disease outbreak caused   by  the COVID-19 (SARS-CoV-2 novel Coronavirus) strain.           SARS-CoV2 (COVID-19) Qualitative PCR Not Detected     Human Metapneumovirus Not Detected     Human Rhinovirus/Enterovirus Not Detected     Influenza A (subtypes H1, H1-2009,H3) Not Detected     Influenza B Not Detected     Parainfluenza Virus 1 Not Detected     Parainfluenza Virus 2 Not Detected     Parainfluenza Virus 3 Not Detected     Parainfluenza Virus 4 Not Detected     Respiratory Syncytial Virus Not Detected     Bordetella Parapertussis ([id number]) Not Detected     Bordetella pertussis (ptxP) Not Detected     Chlamydia pneumoniae Not Detected     Mycoplasma pneumoniae Not Detected     Comment: Respiratory Infection Panel testing performed by Multiplex PCR.       Narrative:      Respiratory Infection Panel source->NP Swab    Culture, Respiratory with Gram Stain [[id number]]     Order Status: Sent Specimen: Respiratory from Sputum, Expectorated             MOST RECENT IMAGING  X-Ray KUB  Narrative: EXAMINATION:  XR KUB    CLINICAL HISTORY:  abd pain/N/V;    FINDINGS:  Two abdominal radiographs at 11:05 hours compared to prior exams including CT 10/17/2024 show a nonobstructive bowel gas pattern.  There is scattered air in the colon, with moderate volume of stool in the distal colon and rectum.  No evidence of intraperitoneal free air.    Rim calcification in the right upper quadrant is unchanged, with no suspicious calcifications overlying the kidneys or ureters.  There are multiple calcified pelvic phleboliths, with right upper quadrant cholecystectomy clips.  There is rightward convex lumbar spinal curvature, with intervertebral disc space narrowing and facet arthropathy in the spine.    The bones are diffusely osteopenic.  Right hip  hardware is incompletely visualized.  There are extensive interstitial and airspace opacities in the visualized lower lungs.  Impression: Nonobstructive bowel gas pattern.    Electronically signed by: Davide Jeffrey  Date:    11/02/2024  Time:    11:18      CURRENT VISIT EKG  Results for orders placed or performed during the hospital encounter of 11/01/24   EKG 12-lead   Result Value Ref Range    QRS Duration 56 ms    [facility] QTC Calculation 482 ms    Narrative    Test Reason : R06.02,    Vent. Rate : 097 BPM     Atrial Rate : 097 BPM     P-R Int : 156 ms          QRS Dur : 056 ms      QT Int : 380 ms       P-R-T Axes : 044 -06 100 degrees     QTc Int : 482 ms    Normal sinus rhythm  Low voltage QRS  Septal infarct (cited on or before 13-OCT-2023)  Abnormal ECG  When compared with ECG of 25-OCT-2024 10:48,  ST now depressed in Lateral leads    Referred By: [email or web address]   SELF           Confirmed By:        ECHOCARDIOGRAM RESULTS  Results for orders placed during the hospital encounter of 10/07/24    Echo    Interpretation Summary    Left Ventricle: The left ventricle is normal in size. Mildly increased wall thickness. There is mild concentric hypertrophy. Moderate global hypokinesis present. There is mildly reduced systolic function with a visually estimated ejection fraction of 40 - 45%. There is normal diastolic function.    Right Ventricle: Normal right ventricular cavity size. Wall thickness is normal. Systolic function is normal.    Left Atrium: Left atrium is moderately dilated.    Mitral Valve: There is bileaflet sclerosis. There is moderate mitral annular calcification present. There is moderate stenosis. The mean pressure gradient across the mitral valve is 8 mmHg at a heart rate of  bpm. There is mild regurgitation with a centrally directed jet.    Pulmonary Artery: The estimated pulmonary artery systolic pressure is 29 mmHg.    IVC/SVC: Normal venous pressure at 3 mmHg.        Oxygen INFORMATION  Oxygen  Concentration (%):  [60-80] 70    15 L           PLAN:.      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  Hopefully the steroids will settle this.  Discussed with nursing and the patient and her  and daughter.    Neva Christian MD  Date of Service: 11/04/2024  11:55 AM

## 2024-11-04 NOTE — PROGRESS NOTES
[facility] Medicine  Progress Note    Patient Name: Alexa Otero  MRN: [id number]  Patient Class: IP- Inpatient   Admission Date: 11/1/2024  Length of Stay: 3 days  Attending Physician: Callie Cherry MD  Primary Care Provider: Idalia Greco MD        Subjective:     Principal Problem:Acute on chronic hypoxic respiratory failure        HPI:  84-year-old female with a past medical history of lung cancer actively getting chemo last of which was 5 days ago, who presented to the ER because of generalized weakness.  According to the patient, she woke up today, tried to get out of bed, but felt very weak, and slid beside the bed.  Did not hit her head, and did not lose consciousness.  She however could not get up however, and her  could not assist.  911 was called.  On arrival of EMS, patient was satting 88% on 4 L, and appeared to be short of breath.  She was brought to the ER for evaluation.    In the ER, vitals showed a blood pressure of 145/65, heart rate of 103, respiratory rate of 20, afebrile satting 60% on 4 L.  Immediately patient was placed on non-rebreather.  CBC with white count of 16.8, and macrocytic anemia.  CMP showed hypokalemia of 3.4.  First troponin is elevated at 46.  Point of care Lactic acid was 2.1.  Blood cultures were collected.  EKG showed sinus rhythm.  Chest x-ray showed no significant change of bilateral diffuse mixed interstitial and airspace lung opacities.  CTA chest was done, and it was negative for PE, but showed extensive interstitial and airspace opacities throughout both lungs, having significantly worsened in the left lung compared to prior CT. Unchanged small to moderate sized bilateral pleural effusions.  Cefepime was ordered, and patient will be admitted to Medicine for further care of her severe sepsis.    Overview/Hospital Course:  Ms. Otero has been monitored closely during her hospitalization. She was admitted on 11/1/2024 with acute on  chronic hypoxemic resp failure. CTA chest reveals extensive interstitial opacities c/w multifocal pna and/or ARDS. She requires high flow nasal cannula up to 15L now on 13L. Pulm and ID have been consulted. Pulm recommends broad spectrum abx - cefepime, doxy, and vanc initially. MRSA screen negative and vanc d/c'ed. She has stage IV lung ca and completed Carbo/Pemetrexed last year and is currently on pemetrexed (Alimta) maintenance with last dose 10/28. This can cause an interstitial pneumonitis and pulm has started prednisone. Plan for bronchoscopy on Monday if no improvement and recommended continuing eliquis. BiPAP qHS and naps. Duonebs Q6h. ID has ordered a respiratory infection panel that is negative. She's had abdominal bloating and discomfort for some time and had an outpt CT abd/pelvis with gastric wall thickening that is is nonspecific could be related to gastritis. She has a history of peptic ulcers and PCP started protonix/carafate. KUB on 11/2 with nonobstructive bowel gas pattern and moderate stool burden and pt was treated with laxative suppository. She has chronic macrocytic anemia that appears at baseline. She had a leukocytosis with left shift on admission that has trended down from 16K-->12K-->8K. CRP 36, procal 2.9. Pt had a BM on 11/2, but abd remained distended. She was found to be retaining a significant amount of urine on bladder scan >900 and priest was placed with 1500mL out with resolution of abd distention. Pulm increased steriods on 11/3 to IV solumedrol, she's requiring BiPAP support and was given IV lasix. Macrocytic anemia at baseline on admission but trending down and she will receive 1 unit PRBCs 11/4. She has     Interval History: Still requiring 15L of O2 with intermittent bipap use. IV lasix with a contraction alkalosis. Will get 1 unit of blood today with 20mg lasix. Awaiting heme/onc eval.     Review of Systems   Constitutional:  Positive for fatigue.   Respiratory:  Positive for  cough and shortness of breath.    Gastrointestinal:  Negative for abdominal distention, constipation, nausea and vomiting.   Neurological:  Positive for weakness.     Objective:     Vital Signs (Most Recent):  Temp: 96.4 °F (35.8 °C) (11/04/24 1515)  Pulse: 77 (11/04/24 1515)  Resp: 19 (11/04/24 1515)  BP: (!) 103/56 (11/04/24 1515)  SpO2: 97 % (11/04/24 1515) Vital Signs (24h Range):  Temp:  [96.4 °F (35.8 °C)-97.5 °F (36.4 °C)] 96.4 °F (35.8 °C)  Pulse:  [67-83] 77  Resp:  [16-24] 19  SpO2:  [95 %-100 %] 97 %  BP: (100-158)/(52-72) 103/56     Weight: 49.5 kg (109 lb 2 oz)  Body mass index is 18.73 kg/m².    Intake/Output Summary (Last 24 hours) at 11/4/2024 1543  Last data filed at 11/4/2024 1235  Gross per 24 hour   Intake 60 ml   Output 1850 ml   Net -1790 ml         Physical Exam  Constitutional:       Comments: Chronically ill appearing   HENT:      Head: Normocephalic and atraumatic.      Nose: Nose normal.      Mouth/Throat:      Mouth: Mucous membranes are moist.      Pharynx: Oropharynx is clear.   Eyes:      Pupils: Pupils are equal, round, and reactive to light.   Cardiovascular:      Rate and Rhythm: Normal rate.   Pulmonary:      Effort: Pulmonary effort is normal.      Comments: Coarse and diminished throughout  Abdominal:      Comments: Abd is firm, mildly distended and nontender Bsx4 quadrants   Musculoskeletal:         General: Normal range of motion.      Cervical back: Normal range of motion and neck supple.   Skin:     General: Skin is warm.      Capillary Refill: Capillary refill takes 2 to 3 seconds.      Findings: Bruising present.   Neurological:      General: No focal deficit present.      Mental Status: She is alert and oriented to person, place, and time.   Psychiatric:         Mood and Affect: Mood normal.         Behavior: Behavior normal.             Significant Labs: All pertinent labs within the past 24 hours have been reviewed.  CBC:   Recent Labs   Lab 11/03/24  0810 11/04/24  0627    WBC 8.39 3.85*   HGB 7.7* 7.6*   HCT 24.9* 24.9*    137*     CMP:   Recent Labs   Lab 11/03/24  0810 11/04/24  0627    132*   K 4.3 4.4   CL 96 92*   CO2 33* 33*   * 230*   BUN 28* 37*   CREATININE 1.1 1.4   CALCIUM 8.8 9.3   PROT 6.0 6.4   ALBUMIN 2.7* 3.0*   BILITOT 0.4 0.4   ALKPHOS 50* 57   AST 13 13   ALT 9* 10   ANIONGAP 8 7*       TSH:   Recent Labs   Lab 10/07/24  1713   TSH 0.876           Significant Imaging: I have reviewed all pertinent imaging results/findings within the past 24 hours.    X-Ray KUB    Result Date: 11/2/2024  EXAMINATION: XR KUB CLINICAL HISTORY: abd pain/N/V; FINDINGS: Two abdominal radiographs at 11:05 hours compared to prior exams including CT 10/17/2024 show a nonobstructive bowel gas pattern.  There is scattered air in the colon, with moderate volume of stool in the distal colon and rectum.  No evidence of intraperitoneal free air. Rim calcification in the right upper quadrant is unchanged, with no suspicious calcifications overlying the kidneys or ureters.  There are multiple calcified pelvic phleboliths, with right upper quadrant cholecystectomy clips.  There is rightward convex lumbar spinal curvature, with intervertebral disc space narrowing and facet arthropathy in the spine. The bones are diffusely osteopenic.  Right hip hardware is incompletely visualized.  There are extensive interstitial and airspace opacities in the visualized lower lungs.     Nonobstructive bowel gas pattern. Electronically signed by: Davide Jeffrey Date:    11/02/2024 Time:    11:18    CTA Chest Non-Coronary (PE Studies)    Result Date: 11/1/2024  EXAMINATION: CTA CHEST NON CORONARY (PE STUDIES) CLINICAL HISTORY: Pulmonary embolism (PE) suspected, high prob; lung cancer TECHNIQUE: Thin axial imaging through the chest was performed with 100 mL Omnipaque 350 IV contrast, with sagittal and coronal reformatted images and MIP reconstructions performed, with images stored in the patient's  permanent electronic medical record. FINDINGS: Comparison to multiple prior exams.  There are no pulmonary arterial filling defects to suggest pulmonary thromboembolism.  The central pulmonary arteries are normal in caliber. Diffuse calcified plaque involves normal-caliber thoracic aorta, with the heart enlarged and no pericardial effusion.  There are extensive coronary arterial calcifications, with dense mitral annular calcifications.  No enlarged mediastinal or hilar lymph nodes. There is unchanged volume loss in the right hemithorax, with extensive interstitial and airspace opacities throughout both lungs, in the left lung having significantly worsened compared to the 10/07/2024.  There are scattered lucencies reflecting pulmonary emphysema.  Small to moderate sized low-density bilateral pleural effusions are unchanged, with the central airways patent.  No pneumothorax. Images of the upper abdomen show stable hepatic hypodensity, cholecystectomy clips, and scattered colon diverticula.  There are no acute fractures or destructive osseous lesions.     1. Negative for pulmonary thromboembolism. 2. Extensive interstitial and airspace opacities throughout both lungs, having significantly worsened in the left lung compared to prior CT.  Multifocal pneumonia, drug reaction, and or developing ARDS could have this appearance. 3. Unchanged small to moderate sized bilateral pleural effusions. 4. Additional observations as described. . Electronically signed by: Davide Jeffrey Date:    11/01/2024 Time:    12:51    X-Ray Chest AP Portable    Result Date: 11/1/2024  EXAMINATION: XR CHEST AP PORTABLE CLINICAL HISTORY: Sepsis; FINDINGS: Portable chest with comparison chest x-ray 10/10/2024.  Normal cardiomediastinal silhouette.Bilateral diffuse mixed interstitial and airspace lung opacities are unchanged from previous exam.  Right subclavian Port-A-Cath is unchanged.  Pulmonary vasculature is normal. No acute osseous abnormality.      No significant change of bilateral diffuse mixed interstitial and airspace lung opacities. Electronically signed by: Kp Leiva Date:    11/01/2024 Time:    10:39        Assessment/Plan:      * Acute on chronic hypoxic respiratory failure  Likely secondary to her bilateral extensive pneumonia.  On 4L NC at home  Currently on high flow nasal cannula and intermittent bipap  Pulm consulted   CTA chest ruled out PE.  Echo done last month showed normal systolic function.   Pneumonia vs. Pneumonitis? - pulm added prednisone initially and escalated to IV solumedrol for possible Pemetrexed induced pneumonitis   Amiodarone contributing?  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 Most recent hemoglobin and hematocrit are listed below.  Recent Labs     11/02/24  0433 11/03/24  0810 11/04/24  0627   HGB 8.5* 7.7* 7.6*   HCT 27.9* 24.9* 24.9*       Plan  - Monitor serial CBC: Daily  - Transfuse PRBC if patient becomes hemodynamically unstable, symptomatic or H/H drops below 7/21.  - Patient has received 1 units of PRBCs on 11/4  - Patient's anemia is currently worsening. Will continue current treatment  - T&S in AM in the event she requires a transfusion    Multifocal pneumonia vs. pneumonitis  Patient was started on cefepime, doxy for atypicals, and Vanc, vanc d/c'ed with negative MRSA screen  Consult Pulmonary, and Infectious Disease.    Antibiotics (From admission, onward)      Start     Stop Route Frequency Ordered    11/02/24 0200  ceFEPIme injection 1 g         -- IV Every 12 hours (non-standard times) 11/01/24 2114    11/01/24 1330  doxycycline capsule 100 mg         -- Oral Every 12 hours 11/01/24 1322    11/01/24 1300  mupirocin 2 % ointment         11/06/24 0859  Nasl 2 times daily 11/01/24 1200            Microbiology Results (last 7 days)       Procedure Component Value Units Date/Time    Blood culture x two cultures. Draw prior to antibiotics. [[id number]] Collected: 11/01/24 1051    Order Status: Completed Specimen: Blood from Peripheral, Antecubital, Right Updated: 11/04/24 1032     Blood Culture, Routine No Growth to date      No Growth to date      No Growth to date      No Growth to date    Narrative:      Aerobic and anaerobic    Blood culture x two cultures. Draw prior to antibiotics. [[id number]] Collected: 11/01/24 1045    Order Status: Completed Specimen: Blood from Peripheral, Antecubital, Left Updated: 11/04/24 1032     Blood Culture, Routine No Growth to date      No Growth to date      No Growth to date      No Growth to date    Narrative:      Aerobic and anaerobic    MRSA Screen by PCR [[id number]] Collected: 11/02/24 1329    Order Status: Completed Specimen: Nasal Swab Updated: 11/02/24 1532     MRSA SCREEN BY PCR Negative    Respiratory Infection Panel (PCR), Nasopharyngeal [[id number]] Collected: 11/02/24 1329    Order Status: Completed Specimen: Nasopharyngeal Swab Updated: 11/02/24 1509     Respiratory Infection Panel Source NP swab     Adenovirus Not Detected     Coronavirus 229E, Common Cold Virus Not Detected     Coronavirus HKU1, Common Cold Virus Not Detected     Coronavirus NL63, Common Cold Virus Not Detected     Coronavirus OC43, Common Cold Virus Not Detected     Comment: Coronavirus strains 229E, HKU1, NL63, and OC43 can cause the common   cold   and are not associated with the respiratory disease outbreak caused   by  the COVID-19 (SARS-CoV-2 novel Coronavirus) strain.           SARS-CoV2 (COVID-19) Qualitative PCR Not Detected     Human Metapneumovirus Not Detected     Human Rhinovirus/Enterovirus Not Detected     Influenza A (subtypes H1, H1-2009,H3) Not Detected     Influenza B Not Detected     Parainfluenza Virus 1 Not Detected      Parainfluenza Virus 2 Not Detected     Parainfluenza Virus 3 Not Detected     Parainfluenza Virus 4 Not Detected     Respiratory Syncytial Virus Not Detected     Bordetella Parapertussis ([id number]) Not Detected     Bordetella pertussis (ptxP) Not Detected     Chlamydia pneumoniae Not Detected     Mycoplasma pneumoniae Not Detected     Comment: Respiratory Infection Panel testing performed by Multiplex PCR.       Narrative:      Respiratory Infection Panel source->NP Swab    Culture, Respiratory with Gram Stain [[id number]]     Order Status: Sent Specimen: Respiratory from Sputum, Expectorated             Pleural effusion  Bilateral, stable.   Pulm consulted  In the setting of lung ca      Malignant neoplasm of lower lobe of right lung  Carbo/Pemetrexed until 4/2024 now on Pemetrexed maintenance with last dose 10/28/24  Consult pt's oncologist, Dr. Cassidy, in AM      Atrial fibrillation  Patient has paroxysmal (<7 days) atrial fibrillation. Patient is currently in sinus rhythm. [id number] Score: 5. The patients heart rate in the last 24 hours is as follows:  Pulse  Min: 67  Max: 83     Antiarrhythmics  Amiodarone and metoprolol    Anticoagulants  Eliquis    Plan  - Replete lytes with a goal of K>4, Mg >2  - Patient is anticoagulated, see medications listed above.  - Patient's afib is currently controlled      VTE Risk Mitigation (From admission, onward)           Ordered     apixaban tablet 2.5 mg  2 times daily         11/01/24 1316     IP VTE HIGH RISK PATIENT  Once         11/01/24 1158     Place sequential compression device  Until discontinued         11/01/24 1158                    Discharge Planning   ALYCIA:      Code Status: DNR   Is the patient medically ready for discharge?:     Reason for patient still in hospital (select all that apply): Patient trending condition, Treatment, and Consult recommendations  Discharge Plan A: Home Health                  Gila Carranza NP  Department of Hospital Medicine    [facility]

## 2024-11-04 NOTE — PROGRESS NOTES
Pharmacy Consult Discontinuation: Vancomycin    Alexa Otero [id number] is a 84 y.o. female was consulted for vancomycin pharmacotherapy management by pharmacy.    Pharmacy consult for vancomycin dosing is no longer required.  Vancomycin was discontinued 11/04/2024.    Thank you for allowing us to participate in this patient's care. Should you have any questions or concerns please feel free to contact the pharmacy department at [phone].    Chadwick Breaux, NeerajD

## 2024-11-04 NOTE — CARE UPDATE
11/04/24 0753   PRE-TX-O2   SpO2 100 %   Pulse 70   Resp 16   Aerosol Therapy   $ Aerosol Therapy Charges Aerosol Treatment   Daily Review of Necessity (SVN) completed   Respiratory Treatment Status (SVN) given   Treatment Route (SVN) mask;oxygen   Patient Position HOB elevated   Post Treatment Assessment (SVN) breath sounds unchanged   Signs of Intolerance (SVN) none   Breath Sounds Post-Respiratory Treatment   Post-treatment Heart Rate (beats/min) 74   Post-treatment Resp Rate (breaths/min) 18   Preset CPAP/BiPAP Settings   Mode Of Delivery Standby   CPAP/BIPAP charged w/in last 24 h YES

## 2024-11-04 NOTE — SUBJECTIVE & OBJECTIVE
Interval History: Still requiring 15L of O2 with intermittent bipap use. IV lasix with a contraction alkalosis. Will get 1 unit of blood today with 20mg lasix. Awaiting heme/onc eval.     Review of Systems   Constitutional:  Positive for fatigue.   Respiratory:  Positive for cough and shortness of breath.    Gastrointestinal:  Negative for abdominal distention, constipation, nausea and vomiting.   Neurological:  Positive for weakness.     Objective:     Vital Signs (Most Recent):  Temp: 96.4 °F (35.8 °C) (11/04/24 1515)  Pulse: 77 (11/04/24 1515)  Resp: 19 (11/04/24 1515)  BP: (!) 103/56 (11/04/24 1515)  SpO2: 97 % (11/04/24 1515) Vital Signs (24h Range):  Temp:  [96.4 °F (35.8 °C)-97.5 °F (36.4 °C)] 96.4 °F (35.8 °C)  Pulse:  [67-83] 77  Resp:  [16-24] 19  SpO2:  [95 %-100 %] 97 %  BP: (100-158)/(52-72) 103/56     Weight: 49.5 kg (109 lb 2 oz)  Body mass index is 18.73 kg/m².    Intake/Output Summary (Last 24 hours) at 11/4/2024 1543  Last data filed at 11/4/2024 1235  Gross per 24 hour   Intake 60 ml   Output 1850 ml   Net -1790 ml         Physical Exam  Constitutional:       Comments: Chronically ill appearing   HENT:      Head: Normocephalic and atraumatic.      Nose: Nose normal.      Mouth/Throat:      Mouth: Mucous membranes are moist.      Pharynx: Oropharynx is clear.   Eyes:      Pupils: Pupils are equal, round, and reactive to light.   Cardiovascular:      Rate and Rhythm: Normal rate.   Pulmonary:      Effort: Pulmonary effort is normal.      Comments: Coarse and diminished throughout  Abdominal:      Comments: Abd is firm, mildly distended and nontender Bsx4 quadrants   Musculoskeletal:         General: Normal range of motion.      Cervical back: Normal range of motion and neck supple.   Skin:     General: Skin is warm.      Capillary Refill: Capillary refill takes 2 to 3 seconds.      Findings: Bruising present.   Neurological:      General: No focal deficit present.      Mental Status: She is alert  and oriented to person, place, and time.   Psychiatric:         Mood and Affect: Mood normal.         Behavior: Behavior normal.             Significant Labs: All pertinent labs within the past 24 hours have been reviewed.  CBC:   Recent Labs   Lab 11/03/24  0810 11/04/24 0627   WBC 8.39 3.85*   HGB 7.7* 7.6*   HCT 24.9* 24.9*    137*     CMP:   Recent Labs   Lab 11/03/24  0810 11/04/24 0627    132*   K 4.3 4.4   CL 96 92*   CO2 33* 33*   * 230*   BUN 28* 37*   CREATININE 1.1 1.4   CALCIUM 8.8 9.3   PROT 6.0 6.4   ALBUMIN 2.7* 3.0*   BILITOT 0.4 0.4   ALKPHOS 50* 57   AST 13 13   ALT 9* 10   ANIONGAP 8 7*       TSH:   Recent Labs   Lab 10/07/24  1713   TSH 0.876           Significant Imaging: I have reviewed all pertinent imaging results/findings within the past 24 hours.    X-Ray KUB    Result Date: 11/2/2024  EXAMINATION: XR KUB CLINICAL HISTORY: abd pain/N/V; FINDINGS: Two abdominal radiographs at 11:05 hours compared to prior exams including CT 10/17/2024 show a nonobstructive bowel gas pattern.  There is scattered air in the colon, with moderate volume of stool in the distal colon and rectum.  No evidence of intraperitoneal free air. Rim calcification in the right upper quadrant is unchanged, with no suspicious calcifications overlying the kidneys or ureters.  There are multiple calcified pelvic phleboliths, with right upper quadrant cholecystectomy clips.  There is rightward convex lumbar spinal curvature, with intervertebral disc space narrowing and facet arthropathy in the spine. The bones are diffusely osteopenic.  Right hip hardware is incompletely visualized.  There are extensive interstitial and airspace opacities in the visualized lower lungs.     Nonobstructive bowel gas pattern. Electronically signed by: Davide Jeffrey Date:    11/02/2024 Time:    11:18    CTA Chest Non-Coronary (PE Studies)    Result Date: 11/1/2024  EXAMINATION: CTA CHEST NON CORONARY (PE STUDIES) CLINICAL  HISTORY: Pulmonary embolism (PE) suspected, high prob; lung cancer TECHNIQUE: Thin axial imaging through the chest was performed with 100 mL Omnipaque 350 IV contrast, with sagittal and coronal reformatted images and MIP reconstructions performed, with images stored in the patient's permanent electronic medical record. FINDINGS: Comparison to multiple prior exams.  There are no pulmonary arterial filling defects to suggest pulmonary thromboembolism.  The central pulmonary arteries are normal in caliber. Diffuse calcified plaque involves normal-caliber thoracic aorta, with the heart enlarged and no pericardial effusion.  There are extensive coronary arterial calcifications, with dense mitral annular calcifications.  No enlarged mediastinal or hilar lymph nodes. There is unchanged volume loss in the right hemithorax, with extensive interstitial and airspace opacities throughout both lungs, in the left lung having significantly worsened compared to the 10/07/2024.  There are scattered lucencies reflecting pulmonary emphysema.  Small to moderate sized low-density bilateral pleural effusions are unchanged, with the central airways patent.  No pneumothorax. Images of the upper abdomen show stable hepatic hypodensity, cholecystectomy clips, and scattered colon diverticula.  There are no acute fractures or destructive osseous lesions.     1. Negative for pulmonary thromboembolism. 2. Extensive interstitial and airspace opacities throughout both lungs, having significantly worsened in the left lung compared to prior CT.  Multifocal pneumonia, drug reaction, and or developing ARDS could have this appearance. 3. Unchanged small to moderate sized bilateral pleural effusions. 4. Additional observations as described. . Electronically signed by: Davide Jeffrey Date:    11/01/2024 Time:    12:51    X-Ray Chest AP Portable    Result Date: 11/1/2024  EXAMINATION: XR CHEST AP PORTABLE CLINICAL HISTORY: Sepsis; FINDINGS: Portable chest  with comparison chest x-ray 10/10/2024.  Normal cardiomediastinal silhouette.Bilateral diffuse mixed interstitial and airspace lung opacities are unchanged from previous exam.  Right subclavian Port-A-Cath is unchanged.  Pulmonary vasculature is normal. No acute osseous abnormality.     No significant change of bilateral diffuse mixed interstitial and airspace lung opacities. Electronically signed by: Kp Leiva Date:    11/01/2024 Time:    10:39

## 2024-11-04 NOTE — ASSESSMENT & PLAN NOTE
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 Most recent hemoglobin and hematocrit are listed below.  Recent Labs     11/02/24  0433 11/03/24  0810 11/04/24  0627   HGB 8.5* 7.7* 7.6*   HCT 27.9* 24.9* 24.9*       Plan  - Monitor serial CBC: Daily  - Transfuse PRBC if patient becomes hemodynamically unstable, symptomatic or H/H drops below 7/21.  - Patient has received 1 units of PRBCs on 11/4  - Patient's anemia is currently worsening. Will continue current treatment  - T&S in AM in the event she requires a transfusion

## 2024-11-04 NOTE — PROGRESS NOTES
"[facility]   Department of Infectious Disease  Progress Note        PATIENT NAME: Alexa Otero  MRN: [id number]  TODAY'S DATE: 11/04/2024  ADMIT DATE: 11/1/2024  LOS: 3 days    CHIEF COMPLAINT: Generalized Weakness (Per EMS, patient slipped out of bed onto floor hitting bottom. Did not hit head. Realized she was weaker than normal. Hx lung cancer. Sating 60s  on 4L in triage.)      PRINCIPLE PROBLEM: Severe sepsis    INTERVAL HISTORY      11/03/2024:  Seen I was at bedside.  No acute issues overnight.  Currently on BiPAP.  Has left side pain which he said predated her fall and recent admission.  No mention of rib fracture on her recent CT chest.    11/04/2024:  Remains about the same.  No acute issues overnight.  MRSA screen negative.  She is off BiPAP and currently on HFNO.    Antibiotics (From admission, onward)      Start     Stop Route Frequency Ordered    11/02/24 1700  vancomycin 750 mg in dextrose 5 % 250 mL IVPB (ready to mix)         -- IV Every 24 hours (non-standard times) 11/02/24 1638    11/02/24 0200  ceFEPIme injection 1 g         -- IV Every 12 hours (non-standard times) 11/01/24 2114    11/01/24 1422  vancomycin - pharmacy to dose  (vancomycin IVPB (PEDS and ADULTS))        Placed in "And" Linked Group    -- IV pharmacy to manage frequency 11/01/24 1322    11/01/24 1330  doxycycline capsule 100 mg         -- Oral Every 12 hours 11/01/24 1322    11/01/24 1300  mupirocin 2 % ointment         11/06/24 0859 Nasl 2 times daily 11/01/24 1200          Antifungals (From admission, onward)      None           Antivirals (From admission, onward)      None            ASSESSMENT and PLAN      1. Respiratory failure in a patient with lung cancer and background COPD.  Also CT chest sure increase bilateral pulmonary infiltrates.  Agree with antibiotics for coverage of pneumonia x7 days through 11/08/2024.  Interstitial pneumonitis from Alimta is a possibility.  Continue other symptomatic management " with steroid and oxygen as per hospitalist and pulmonologist.     2.   Lung cancer.  On Alimta     3. COPD.  Management as per pulmonologist and hospitalist.       4. Chronic upper abdominal pain with gastritis.    5. Left posterior rib pain.  Maybe related to pneumoniae with pleurisy.      RECOMMENDATIONS  Complete 7 days of cefepime and doxycycline 11/08/2024   Management of respiratory failure as per pulmonologist    Thank you for involving ID in the care of this patient.  ID service will drop off today.  Please call with other questions.      SUBJECTIVE    Alexa Otero is a 84 y.o. female she has a history of lung cancer status post lower lobectomy in October 20, 2023 followed by adjuvant chemotherapy from December 20, 2023 through May 20, 2024.  She has resumed chemotherapy again in July 20, 2024 with Alimta.  She has a background COPD and is on 3L oxygen per minute at home.  Also with a chronic intermittent upper abdominal pain for the last 1 year has been diagnosed with gastritis.     Presents to the ER 11/01/2024 with fatigue.  She has slid down to the ground when she got out of bed due to fatigue.  EMS was activated.  She was saturating at 88% on 4 L. brought to the ER for inpatient care.  Chest x-ray was stable.  CT chest showed worsened bilateral infiltrate with also bilateral pleural effusion compared to previous CT.  She was admitted and placed on broad-spectrum antibiotics.  She has been evaluated by pulmonologist with plan for bronchoscopy early next week.       Antibiotic history:  Vancomycin: 11/01/2024-  Cefepime:  11/01/2024-  Doxycycline: 11/01/2024-       Microbiology:   Blood culture 11/01/2024: NGTD   Respiratory panel 11/02/2024: Negative    Review of Systems  Negative except as stated above in Interval History     OBJECTIVE   Temp:  [97 °F (36.1 °C)-97.5 °F (36.4 °C)] 97.1 °F (36.2 °C)  Pulse:  [67-83] 83  Resp:  [16-25] 16  SpO2:  [95 %-100 %] 98 %  BP: (123-158)/(53-72) 130/54  Temp:   [97 °F (36.1 °C)-97.5 °F (36.4 °C)]   Temp: 97.1 °F (36.2 °C) (11/04/24 0858)  Pulse: 83 (11/04/24 0858)  Resp: 16 (11/04/24 0858)  BP: (!) 130/54 (11/04/24 0858)  SpO2: 98 % (11/04/24 0858)    Intake/Output Summary (Last 24 hours) at 11/4/2024 1015  Last data filed at 11/3/2024 1817  Gross per 24 hour   Intake 60 ml   Output 1000 ml   Net -940 ml     Physical Examination  General:  Thin elderly woman lying in bed.   HEENT: No oral thrush, no cervical adenopathy   Respiratory:  on HFNO. Clear to auscultation anteriorly  CVS: S1 and 2 heard   Abdomen:  Full, soft, nontender, no palpable organomegaly   Skin:  No rash appreciated   CNS: No focal deficits   Musculoskeletal: No joint or bony abnormalities appreciated     VAD:  Right chest MediPort  ISOLATION:  None     Wounds:  None      Significant Labs: All pertinent labs within the past 24 hours have been reviewed.    CBC LAST 7 DAYS  Recent Labs   Lab 11/01/24  0950 11/02/24  0433 11/03/24  0810 11/04/24  0627   WBC 16.89* 12.30 8.39 3.85*   RBC 2.76* 2.69* 2.37* 2.40*   HGB 8.7* 8.5* 7.7* 7.6*   HCT 28.5* 27.9* 24.9* 24.9*   * 104* 105* 104*   MCH 31.5* 31.6* 32.5* 31.7*   MCHC 30.5* 30.5* 30.9* 30.5*   RDW 18.5* 18.6* 18.2* 18.1*    233 163 137*   MPV 8.9* 9.4 9.3 10.1   GRAN 96.8*  16.4* 94.6*  11.6* 91.8*  7.7 87.0*  3.4   LYMPH 0.9*  0.2* 3.5*  0.4* 5.1*  0.4* 5.5*  0.2*   MONO 0.4*  0.1* 0.8*  0.1* 1.9*  0.2* 4.4  0.2*   BASO 0.01 0.01 0.01 0.00   NRBC 0 0 0 0       CHEMISTRY LAST 7 DAYS  Recent Labs   Lab 11/01/24  0950 11/02/24  0433 11/03/24  0810 11/04/24  0627    138 137 132*   K 3.4* 5.0 4.3 4.4   CL 99 98 96 92*   CO2 29 31* 33* 33*   ANIONGAP 10 9 8 7*   BUN 24* 22 28* 37*   CREATININE 1.0 1.0 1.1 1.4   * 140* 146* 230*   CALCIUM 8.4* 8.8 8.8 9.3   MG 1.8 1.9 2.0 2.2   ALBUMIN 3.0* 2.9* 2.7* 3.0*   PROT 6.7 6.7 6.0 6.4   ALKPHOS 46* 44* 50* 57   ALT 9* 8* 9* 10   AST 18 16 13 13   BILITOT 0.6 0.5 0.4 0.4  "      Estimated Creatinine Clearance: 23.8 mL/min (based on SCr of 1.4 mg/dL).    INFLAMMATORY/PROCAL  LAST 7 DAYS  Recent Labs   Lab 11/02/24  1335   PROCAL 2.950*   CRP 36.90*     No results found for: "ESR"  CRP   Date Value Ref Range Status   11/02/2024 36.90 (H) <1.00 mg/dL Final     Comment:     CRP-Normal Application expected values:   <1.0        mg/dL   Normal Range  1.0 - 5.0  mg/dL   Indicates mild inflammation  5.0 - 10.0 mg/dL   Indicates severe inflammation  >10.0        mg/dL   Represents serious processes and   frequently         indicates the presence of a bacterial   infection.      12/11/2023 39.3 (H) 0.0 - 8.2 mg/L Final   12/10/2023 65.7 (H) 0.0 - 8.2 mg/L Final   12/04/2023 378.6 (H) 0.0 - 8.2 mg/L Final       PRIOR  MICROBIOLOGY:    Susceptibility data from last 90 days.  Collected Specimen Info Organism Amp/Sulbactam Ampicillin Cefazolin Cefepime Ceftriaxone Ciprofloxacin Ertapenem Gentamicin Levofloxacin Meropenem Nitrofurantoin PIPERACILLIN/TAZO SUSCEPTIBILITY Tobramycin Trimeth/Sulfa   10/18/24 Urine, Clean Catch Escherichia coli  R  R  S  S  S  S  S  S  S  S  S  S  S  R   10/07/24 Blood from Peripheral, Hand, Right No growth after 5 days.                 10/07/24 Blood from Peripheral, Hand, Left No growth after 5 days.                     LAST 7 DAYS MICROBIOLOGY   Microbiology Results (last 7 days)       Procedure Component Value Units Date/Time    Blood culture x two cultures. Draw prior to antibiotics. [[id number]] Collected: 11/01/24 1045    Order Status: Completed Specimen: Blood from Peripheral, Antecubital, Left Updated: 11/03/24 1032     Blood Culture, Routine No Growth to date      No Growth to date      No Growth to date    Narrative:      Aerobic and anaerobic    Blood culture x two cultures. Draw prior to antibiotics. [[id number]] Collected: 11/01/24 1051    Order Status: Completed Specimen: Blood from Peripheral, Antecubital, Right Updated: 11/03/24 1032     Blood Culture, " Routine No Growth to date      No Growth to date      No Growth to date    Narrative:      Aerobic and anaerobic    MRSA Screen by PCR [[id number]] Collected: 11/02/24 1329    Order Status: Completed Specimen: Nasal Swab Updated: 11/02/24 1532     MRSA SCREEN BY PCR Negative    Respiratory Infection Panel (PCR), Nasopharyngeal [[id number]] Collected: 11/02/24 1329    Order Status: Completed Specimen: Nasopharyngeal Swab Updated: 11/02/24 1509     Respiratory Infection Panel Source NP swab     Adenovirus Not Detected     Coronavirus 229E, Common Cold Virus Not Detected     Coronavirus HKU1, Common Cold Virus Not Detected     Coronavirus NL63, Common Cold Virus Not Detected     Coronavirus OC43, Common Cold Virus Not Detected     Comment: Coronavirus strains 229E, HKU1, NL63, and OC43 can cause the common   cold   and are not associated with the respiratory disease outbreak caused   by  the COVID-19 (SARS-CoV-2 novel Coronavirus) strain.           SARS-CoV2 (COVID-19) Qualitative PCR Not Detected     Human Metapneumovirus Not Detected     Human Rhinovirus/Enterovirus Not Detected     Influenza A (subtypes H1, H1-2009,H3) Not Detected     Influenza B Not Detected     Parainfluenza Virus 1 Not Detected     Parainfluenza Virus 2 Not Detected     Parainfluenza Virus 3 Not Detected     Parainfluenza Virus 4 Not Detected     Respiratory Syncytial Virus Not Detected     Bordetella Parapertussis ([id number]) Not Detected     Bordetella pertussis (ptxP) Not Detected     Chlamydia pneumoniae Not Detected     Mycoplasma pneumoniae Not Detected     Comment: Respiratory Infection Panel testing performed by Multiplex PCR.       Narrative:      Respiratory Infection Panel source->NP Swab    Culture, Respiratory with Gram Stain [[id number]]     Order Status: Sent Specimen: Respiratory from Sputum, Expectorated           CURRENT/PREVIOUS VISIT EKG  Results for orders placed or performed during the hospital encounter of 11/01/24   EKG  12-lead    Collection Time: 11/01/24  9:53 AM   Result Value Ref Range    QRS Duration 56 ms    [facility] QTC Calculation 482 ms    Narrative    Test Reason : R06.02,    Vent. Rate : 097 BPM     Atrial Rate : 097 BPM     P-R Int : 156 ms          QRS Dur : 056 ms      QT Int : 380 ms       P-R-T Axes : 044 -06 100 degrees     QTc Int : 482 ms    Normal sinus rhythm  Low voltage QRS  Septal infarct (cited on or before 13-OCT-2023)  Abnormal ECG  When compared with ECG of 25-OCT-2024 10:48,  ST now depressed in Lateral leads    Referred By: [email or web address]   SELF           Confirmed By:        Significant Imaging: I have reviewed all relevant and available imaging results/findings within the past 24 hours.    I spent a total of 45 minutes on the day of the visit.This includes face to face time and non-face to face time preparing to see the patient (eg, review of tests), obtaining and/or reviewing separately obtained history, documenting clinical information in the electronic or other health record, independently interpreting results and communicating results to the patient/family/caregiver, or care coordinator.    Byron Galvez MD  Date of Service: 11/04/2024      This note was created using [employer] voice recognition software that occasionally misinterpreted phrases or words.

## 2024-11-04 NOTE — PLAN OF CARE
Goals to be met by: 24     Patient will increase functional independence with mobility by performin. Supine to sit with Supervision  2. Sit to stand transfer with Supervision  3. Bed to chair transfer with Supervision using Rolling Walker  4. Gait  x 100 feet with Supervision using Rolling Walker.

## 2024-11-04 NOTE — ASSESSMENT & PLAN NOTE
Patient was started on cefepime, doxy for atypicals, and Vanc, vanc d/c'ed with negative MRSA screen  Consult Pulmonary, and Infectious Disease.    Antibiotics (From admission, onward)      Start     Stop Route Frequency Ordered    11/02/24 0200  ceFEPIme injection 1 g         -- IV Every 12 hours (non-standard times) 11/01/24 2114    11/01/24 1330  doxycycline capsule 100 mg         -- Oral Every 12 hours 11/01/24 1322    11/01/24 1300  mupirocin 2 % ointment         11/06/24 0859 Nasl 2 times daily 11/01/24 1200            Microbiology Results (last 7 days)       Procedure Component Value Units Date/Time    Blood culture x two cultures. Draw prior to antibiotics. [[id number]] Collected: 11/01/24 1051    Order Status: Completed Specimen: Blood from Peripheral, Antecubital, Right Updated: 11/04/24 1032     Blood Culture, Routine No Growth to date      No Growth to date      No Growth to date      No Growth to date    Narrative:      Aerobic and anaerobic    Blood culture x two cultures. Draw prior to antibiotics. [[id number]] Collected: 11/01/24 1045    Order Status: Completed Specimen: Blood from Peripheral, Antecubital, Left Updated: 11/04/24 1032     Blood Culture, Routine No Growth to date      No Growth to date      No Growth to date      No Growth to date    Narrative:      Aerobic and anaerobic    MRSA Screen by PCR [[id number]] Collected: 11/02/24 1329    Order Status: Completed Specimen: Nasal Swab Updated: 11/02/24 1532     MRSA SCREEN BY PCR Negative    Respiratory Infection Panel (PCR), Nasopharyngeal [[id number]] Collected: 11/02/24 1329    Order Status: Completed Specimen: Nasopharyngeal Swab Updated: 11/02/24 1509     Respiratory Infection Panel Source NP swab     Adenovirus Not Detected     Coronavirus 229E, Common Cold Virus Not Detected     Coronavirus HKU1, Common Cold Virus Not Detected     Coronavirus NL63, Common Cold Virus Not Detected     Coronavirus OC43, Common Cold Virus Not Detected      Comment: Coronavirus strains 229E, HKU1, NL63, and OC43 can cause the common   cold   and are not associated with the respiratory disease outbreak caused   by  the COVID-19 (SARS-CoV-2 novel Coronavirus) strain.           SARS-CoV2 (COVID-19) Qualitative PCR Not Detected     Human Metapneumovirus Not Detected     Human Rhinovirus/Enterovirus Not Detected     Influenza A (subtypes H1, H1-2009,H3) Not Detected     Influenza B Not Detected     Parainfluenza Virus 1 Not Detected     Parainfluenza Virus 2 Not Detected     Parainfluenza Virus 3 Not Detected     Parainfluenza Virus 4 Not Detected     Respiratory Syncytial Virus Not Detected     Bordetella Parapertussis ([id number]) Not Detected     Bordetella pertussis (ptxP) Not Detected     Chlamydia pneumoniae Not Detected     Mycoplasma pneumoniae Not Detected     Comment: Respiratory Infection Panel testing performed by Multiplex PCR.       Narrative:      Respiratory Infection Panel source->NP Swab    Culture, Respiratory with Gram Stain [[id number]]     Order Status: Sent Specimen: Respiratory from Sputum, Expectorated

## 2024-11-04 NOTE — ASSESSMENT & PLAN NOTE
Patient has paroxysmal (<7 days) atrial fibrillation. Patient is currently in sinus rhythm. [id number] Score: 5. The patients heart rate in the last 24 hours is as follows:  Pulse  Min: 67  Max: 83     Antiarrhythmics  Amiodarone and metoprolol    Anticoagulants  Eliquis    Plan  - Replete lytes with a goal of K>4, Mg >2  - Patient is anticoagulated, see medications listed above.  - Patient's afib is currently controlled

## 2024-11-05 NOTE — ASSESSMENT & PLAN NOTE
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 Most recent hemoglobin and hematocrit are listed below.  Recent Labs     11/03/24  0810 11/04/24  0627 11/05/24  0654   HGB 7.7* 7.6* 8.3*   HCT 24.9* 24.9* 25.5*       Plan  - Monitor serial CBC: Daily  - Transfuse PRBC if patient becomes hemodynamically unstable, symptomatic or H/H drops below 7/21.  - Patient has received 1 units of PRBCs on 11/4  - Patient's anemia is currently worsening. Will continue current treatment  - T&S in AM in the event she requires a transfusion

## 2024-11-05 NOTE — SUBJECTIVE & OBJECTIVE
Oncology Treatment Plan:   OP NSCLC PEMETREXED + CARBOPLATIN (AUC) Q3W    Medications:  Continuous Infusions:  Scheduled Meds:   albuterol-ipratropium  3 mL Nebulization Q6H WAKE    amiodarone  200 mg Oral BID    apixaban  2.5 mg Oral BID    ceFEPime IV (PEDS and ADULTS)  1 g Intravenous Q12H    doxycycline  100 mg Oral Q12H    folic acid  1 mg Oral Daily    gabapentin  100 mg Oral BID    magnesium oxide  400 mg Oral Daily    methylPREDNISolone injection (PEDS and ADULTS)  40 mg Intravenous Q8H    metoprolol succinate  25 mg Oral Daily    mupirocin   Nasal BID    pantoprazole  40 mg Oral Daily    polyethylene glycol  17 g Oral Daily    sucralfate  1 g Oral QID     PRN Meds:  Current Facility-Administered Medications:     0.9%  NaCl infusion (for blood administration), , Intravenous, Q24H PRN    acetaminophen, 650 mg, Oral, Q8H PRN    acetaminophen, 650 mg, Oral, Q4H PRN    aluminum-magnesium hydroxide-simethicone, 30 mL, Oral, QID PRN    dextrose 50%, 12.5 g, Intravenous, PRN    dextrose 50%, 25 g, Intravenous, PRN    glucagon (human recombinant), 1 mg, Intramuscular, PRN    glucose, 16 g, Oral, PRN    glucose, 24 g, Oral, PRN    HYDROcodone-acetaminophen, 1 tablet, Oral, Q6H PRN    insulin aspart U-100, 0-10 Units, Subcutaneous, QID (AC + HS) PRN    LORazepam, 0.5 mg, Oral, Q8H PRN    magnesium oxide, 800 mg, Oral, PRN    magnesium oxide, 800 mg, Oral, PRN    melatonin, 6 mg, Oral, Nightly PRN    naloxone, 0.02 mg, Intravenous, PRN    ondansetron, 4 mg, Intravenous, Q6H PRN    potassium bicarbonate, 35 mEq, Oral, PRN    potassium bicarbonate, 50 mEq, Oral, PRN    potassium bicarbonate, 60 mEq, Oral, PRN    potassium, sodium phosphates, 2 packet, Oral, PRN    potassium, sodium phosphates, 2 packet, Oral, PRN    potassium, sodium phosphates, 2 packet, Oral, PRN    senna-docusate 8.6-50 mg, 2 tablet, Oral, BID PRN    sodium chloride 0.9%, 2 mL, Intravenous, Q12H PRN     Review of patient's allergies indicates:  No  Known Allergies     Past Medical History:   Diagnosis Date    Arthritis     Cardiac arrest 10/2023    Cataract     Chronic obstructive pulmonary disease, unspecified COPD type 03/20/2024    Colon polyp     Coronary artery disease 03/20/2024    Diabetes mellitus type II 2012    Encounter for blood transfusion     Extra-ovarian endometrioid adenocarcinoma 2020    GERD (gastroesophageal reflux disease)     History of chronic cough     clear productive    Hyperlipidemia     Hypertension     Macular degeneration     Ovarian cancer     PONV (postoperative nausea and vomiting)     Squamous cell carcinoma 1980's    precancer of cervix     Past Surgical History:   Procedure Laterality Date    AUGMENTATION OF BREAST      BRONCHOSCOPY N/A 12/12/2023    Procedure: BRONCHOSCOPY;  Surgeon: Neva Christian MD;  Location: [facility] ENDO;  Service: ENT;  Laterality: N/A;    BRONCHOSCOPY WITH FLUOROSCOPY Right 05/11/2023    Procedure: BRONCHOSCOPY, WITH FLUOROSCOPY;  Surgeon: Neva Christian MD;  Location: [facility] ENDO;  Service: Pulmonary;  Laterality: Right;    BRONCHOSCOPY WITH FLUOROSCOPY N/A 12/15/2023    Procedure: BRONCHOSCOPY, WITH FLUOROSCOPY;  Surgeon: Neva Christian MD;  Location: [facility];  Service: Pulmonary;  Laterality: N/A;    CATARACT EXTRACTION BILATERAL W/ ANTERIOR VITRECTOMY Bilateral     CHOLECYSTECTOMY      COLONOSCOPY      COLONOSCOPY N/A 04/20/2023    Procedure: COLONOSCOPY;  Surgeon: Sabino Stephenson MD;  Location: [facility];  Service: Endoscopy;  Laterality: N/A;    CORONARY STENT PLACEMENT  10/2023    x 3    ESOPHAGOGASTRODUODENOSCOPY N/A 06/20/2018    Procedure: EGD (ESOPHAGOGASTRODUODENOSCOPY);  Surgeon: Sabino Stephenson MD;  Location: [facility] ENDO;  Service: Endoscopy;  Laterality: N/A;    ESOPHAGOGASTRODUODENOSCOPY N/A 03/31/2023    Procedure: EGD (ESOPHAGOGASTRODUODENOSCOPY);  Surgeon: Sabino Stephenson MD;  Location: [facility] ENDO;  Service: Endoscopy;  Laterality: N/A;    ESOPHAGOGASTRODUODENOSCOPY N/A  12/11/2023    Procedure: EGD (ESOPHAGOGASTRODUODENOSCOPY);  Surgeon: Pretty Salgado MD;  Location: [facility] ENDO;  Service: Endoscopy;  Laterality: N/A;    HYSTERECTOMY  1976    partial    INJECTION OF ANESTHETIC AGENT AROUND MEDIAL BRANCH NERVES INNERVATING LUMBAR FACET JOINT Right 05/10/2023    Procedure: Block-nerve-Lateral-branch-lumbar;  Surgeon: Agustin Robles MD;  Location: [facility] OR;  Service: Pain Management;  Laterality: Right;  L5 and s1,s2 LBB    INJECTION OF ANESTHETIC AGENT AROUND MEDIAL BRANCH NERVES INNERVATING LUMBAR FACET JOINT Right 05/30/2023    Procedure: Block-nerve-medial branch-lumbar;  Surgeon: Agustin Robles MD;  Location: [facility] OR;  Service: Pain Management;  Laterality: Right;  L5, s1 ,s2 LBB #2    INJECTION OF ANESTHETIC AGENT AROUND NERVE Right 10/31/2023    Procedure: INTERCOSTAL NERVE BLOCK;  Surgeon: Washington Shankar MD;  Location: [facility] OR;  Service: Cardiothoracic;  Laterality: Right;    INJECTION, SACROILIAC JOINT Right 01/26/2023    Procedure: INJECTION,SACROILIAC JOINT;  Surgeon: Agustin Robles MD;  Location: [facility] OR;  Service: Pain Management;  Laterality: Right;    INSERTION OF TUNNELED CENTRAL VENOUS CATHETER (CVC) WITH SUBCUTANEOUS PORT Right 10/19/2020    Procedure: INSERTION, PORT-A-CATH;  Surgeon: Misti Mcfarland MD;  Location: [facility] OR;  Service: General;  Laterality: Right;    INTRAMEDULLARY RODDING OF TROCHANTER OF FEMUR Right 11/28/2021    Procedure: INSERTION, INTRAMEDULLARY LUC, FEMUR, TROCHANTER/RIGHT TFN DR FAJARDO NOTIFIED REP;  Surgeon: Kp Fajardo MD;  Location: [facility] OR;  Service: Orthopedics;  Laterality: Right;  SKIP    ROBOT-ASSISTED LAPAROSCOPIC LYMPHADENECTOMY USING DA NELLA XI N/A 09/21/2020    Procedure: XI ROBOTIC LYMPHADENECTOMY-pelvic and kell-aortic;  Surgeon: Altagracia Ray MD;  Location: [facility] OR;  Service: OB/GYN;  Laterality: N/A;    ROBOT-ASSISTED LAPAROSCOPIC OMENTECTOMY USING DA NELLA XI N/A 09/21/2020    Procedure: XI ROBOTIC OMENTECTOMY;   Surgeon: Altagracia Ray MD;  Location: [facility] OR;  Service: OB/GYN;  Laterality: N/A;    ROBOT-ASSISTED LAPAROSCOPIC SALPINGO-OOPHORECTOMY USING DA NELLA XI Bilateral 2020    Procedure: XI ROBOTIC SALPINGO-OOPHORECTOMY;  Surgeon: Altagracia Ray MD;  Location: [facility] OR;  Service: OB/GYN;  Laterality: Bilateral;    THORACOSCOPIC BIOPSY OF PLEURA Right 10/31/2023    Procedure: VATS, WITH PLEURA BIOPSY;  Surgeon: Washington Shankar MD;  Location: [facility] OR;  Service: Cardiothoracic;  Laterality: Right;  FROZEN SECTION   **KIRSTEN-ASSISDT**    THORACOTOMY Right 10/31/2023    Procedure: THORACOTOMY;  Surgeon: Washington Shankar MD;  Location: [facility] OR;  Service: Cardiothoracic;  Laterality: Right;    UPPER GASTROINTESTINAL ENDOSCOPY       Family History       Problem Relation (Age of Onset)    Breast cancer Maternal Grandmother, Paternal Grandmother    Cancer Mother (57), Father (56)    Colon polyps Daughter    Melanoma Brother    Skin cancer Sister, Brother, Brother          Tobacco Use    Smoking status: Former     Current packs/day: 0.00     Average packs/day: 1 pack/day for 20.0 years (20.0 ttl pk-yrs)     Types: Cigarettes     Start date:      Quit date:      Years since quittin.8    Smokeless tobacco: Never    Tobacco comments:     quit 40 yrs ago   Substance and Sexual Activity    Alcohol use: Yes     Alcohol/week: 1.0 standard drink of alcohol     Types: 1 Glasses of wine per week     Comment: occasional    Drug use: No    Sexual activity: Not Currently     Partners: Male       Review of Systems   Constitutional:  Negative for fever.   Respiratory:  Positive for shortness of breath.    Cardiovascular:  Negative for chest pain and leg swelling.   Gastrointestinal:  Negative for abdominal pain and blood in stool.   Genitourinary:  Negative for hematuria.   Skin:  Negative for rash.     Objective:     Vital Signs (Most Recent):  Temp: 98 °F (36.7 °C) (24 0448)  Pulse: 64 (24  0805)  Resp: 18 (11/05/24 0805)  BP: (!) 125/51 (11/05/24 0448)  SpO2: 100 % (11/05/24 0805) Vital Signs (24h Range):  Temp:  [96.4 °F (35.8 °C)-98.1 °F (36.7 °C)] 98 °F (36.7 °C)  Pulse:  [64-83] 64  Resp:  [16-19] 18  SpO2:  [94 %-100 %] 100 %  BP: (100-130)/(51-63) 125/51     Weight: 49.5 kg (109 lb 2 oz)  Body mass index is 18.73 kg/m².  Body surface area is 1.5 meters squared.      Intake/Output Summary (Last 24 hours) at 11/5/2024 0807  Last data filed at 11/5/2024 0524  Gross per 24 hour   Intake 275 ml   Output 1350 ml   Net -1075 ml        Physical Exam  Constitutional:       Appearance: Normal appearance.   HENT:      Head: Normocephalic and atraumatic.   Eyes:      General: No scleral icterus.     Conjunctiva/sclera: Conjunctivae normal.   Cardiovascular:      Rate and Rhythm: Normal rate.   Pulmonary:      Effort: Pulmonary effort is normal.   Abdominal:      General: Abdomen is flat.   Neurological:      General: No focal deficit present.      Mental Status: She is alert and oriented to person, place, and time.   Psychiatric:         Mood and Affect: Mood normal.         Behavior: Behavior normal.          Significant Labs:   CBC:   Recent Labs   Lab 11/03/24  0810 11/04/24 0627 11/05/24  0654   WBC 8.39 3.85* 3.76*   HGB 7.7* 7.6* 8.3*   HCT 24.9* 24.9* 25.5*    137* 117*    and CMP:   Recent Labs   Lab 11/03/24  0810 11/04/24 0627    132*   K 4.3 4.4   CL 96 92*   CO2 33* 33*   * 230*   BUN 28* 37*   CREATININE 1.1 1.4   CALCIUM 8.8 9.3   PROT 6.0 6.4   ALBUMIN 2.7* 3.0*   BILITOT 0.4 0.4   ALKPHOS 50* 57   AST 13 13   ALT 9* 10   ANIONGAP 8 7*       Diagnostic Results:  None

## 2024-11-05 NOTE — SUBJECTIVE & OBJECTIVE
Interval History:     Oncology Treatment Plan:   OP NSCLC PEMETREXED + CARBOPLATIN (AUC) Q3W    Medications:  Continuous Infusions:  Scheduled Meds:   albuterol-ipratropium  3 mL Nebulization Q6H WAKE    amiodarone  200 mg Oral BID    apixaban  2.5 mg Oral BID    ceFEPime IV (PEDS and ADULTS)  1 g Intravenous Q12H    doxycycline  100 mg Oral Q12H    folic acid  1 mg Oral Daily    gabapentin  100 mg Oral BID    magnesium oxide  400 mg Oral Daily    methylPREDNISolone injection (PEDS and ADULTS)  40 mg Intravenous Q8H    metoprolol succinate  25 mg Oral Daily    mupirocin   Nasal BID    pantoprazole  40 mg Oral Daily    polyethylene glycol  17 g Oral Daily    sucralfate  1 g Oral QID     PRN Meds:  Current Facility-Administered Medications:     0.9%  NaCl infusion (for blood administration), , Intravenous, Q24H PRN    acetaminophen, 650 mg, Oral, Q8H PRN    acetaminophen, 650 mg, Oral, Q4H PRN    aluminum-magnesium hydroxide-simethicone, 30 mL, Oral, QID PRN    dextrose 50%, 12.5 g, Intravenous, PRN    dextrose 50%, 25 g, Intravenous, PRN    glucagon (human recombinant), 1 mg, Intramuscular, PRN    glucose, 16 g, Oral, PRN    glucose, 24 g, Oral, PRN    HYDROcodone-acetaminophen, 1 tablet, Oral, Q6H PRN    insulin aspart U-100, 0-10 Units, Subcutaneous, QID (AC + HS) PRN    LORazepam, 0.5 mg, Oral, Q8H PRN    magnesium oxide, 800 mg, Oral, PRN    magnesium oxide, 800 mg, Oral, PRN    melatonin, 6 mg, Oral, Nightly PRN    naloxone, 0.02 mg, Intravenous, PRN    ondansetron, 4 mg, Intravenous, Q6H PRN    potassium bicarbonate, 35 mEq, Oral, PRN    potassium bicarbonate, 50 mEq, Oral, PRN    potassium bicarbonate, 60 mEq, Oral, PRN    potassium, sodium phosphates, 2 packet, Oral, PRN    potassium, sodium phosphates, 2 packet, Oral, PRN    potassium, sodium phosphates, 2 packet, Oral, PRN    senna-docusate 8.6-50 mg, 2 tablet, Oral, BID PRN    sodium chloride 0.9%, 2 mL, Intravenous, Q12H PRN       Objective:     Vital  Signs (Most Recent):  Temp: 98 °F (36.7 °C) (11/05/24 0448)  Pulse: 64 (11/05/24 0805)  Resp: 18 (11/05/24 0805)  BP: (!) 125/51 (11/05/24 0448)  SpO2: 100 % (11/05/24 0805) Vital Signs (24h Range):  Temp:  [96.4 °F (35.8 °C)-98.1 °F (36.7 °C)] 98 °F (36.7 °C)  Pulse:  [64-83] 64  Resp:  [16-19] 18  SpO2:  [94 %-100 %] 100 %  BP: (100-130)/(51-63) 125/51     Weight: 49.5 kg (109 lb 2 oz)  Body mass index is 18.73 kg/m².  Body surface area is 1.5 meters squared.      Intake/Output Summary (Last 24 hours) at 11/5/2024 0812  Last data filed at 11/5/2024 0524  Gross per 24 hour   Intake 275 ml   Output 1350 ml   Net -1075 ml        Physical Exam  Constitutional:       Appearance: Normal appearance.   HENT:      Head: Normocephalic and atraumatic.   Eyes:      General: No scleral icterus.     Conjunctiva/sclera: Conjunctivae normal.   Cardiovascular:      Rate and Rhythm: Normal rate.   Pulmonary:      Effort: Pulmonary effort is normal.   Abdominal:      General: Abdomen is flat.   Neurological:      General: No focal deficit present.      Mental Status: She is alert and oriented to person, place, and time.   Psychiatric:         Mood and Affect: Mood normal.         Behavior: Behavior normal.          Significant Labs:   CBC:   Recent Labs   Lab 11/04/24 0627 11/05/24 0654   WBC 3.85* 3.76*   HGB 7.6* 8.3*   HCT 24.9* 25.5*   * 117*    and CMP:   Recent Labs   Lab 11/04/24 0627   *   K 4.4   CL 92*   CO2 33*   *   BUN 37*   CREATININE 1.4   CALCIUM 9.3   PROT 6.4   ALBUMIN 3.0*   BILITOT 0.4   ALKPHOS 57   AST 13   ALT 10   ANIONGAP 7*       Diagnostic Results:  None

## 2024-11-05 NOTE — PROGRESS NOTES
[facility]  Hematology/Oncology  Progress Note    Patient Name: Alexa Otero  Admission Date: 11/1/2024  Hospital Length of Stay: 4 days  Code Status: DNR     Subjective:     HPI:  Ms. Otero is is doing a bit better today.  She is breathing better.  She did sit up on the side of the bed yesterday.     Interval History:     Oncology Treatment Plan:   OP NSCLC PEMETREXED + CARBOPLATIN (AUC) Q3W    Medications:  Continuous Infusions:  Scheduled Meds:   albuterol-ipratropium  3 mL Nebulization Q6H WAKE    amiodarone  200 mg Oral BID    apixaban  2.5 mg Oral BID    ceFEPime IV (PEDS and ADULTS)  1 g Intravenous Q12H    doxycycline  100 mg Oral Q12H    folic acid  1 mg Oral Daily    gabapentin  100 mg Oral BID    magnesium oxide  400 mg Oral Daily    methylPREDNISolone injection (PEDS and ADULTS)  40 mg Intravenous Q8H    metoprolol succinate  25 mg Oral Daily    mupirocin   Nasal BID    pantoprazole  40 mg Oral Daily    polyethylene glycol  17 g Oral Daily    sucralfate  1 g Oral QID     PRN Meds:  Current Facility-Administered Medications:     0.9%  NaCl infusion (for blood administration), , Intravenous, Q24H PRN    acetaminophen, 650 mg, Oral, Q8H PRN    acetaminophen, 650 mg, Oral, Q4H PRN    aluminum-magnesium hydroxide-simethicone, 30 mL, Oral, QID PRN    dextrose 50%, 12.5 g, Intravenous, PRN    dextrose 50%, 25 g, Intravenous, PRN    glucagon (human recombinant), 1 mg, Intramuscular, PRN    glucose, 16 g, Oral, PRN    glucose, 24 g, Oral, PRN    HYDROcodone-acetaminophen, 1 tablet, Oral, Q6H PRN    insulin aspart U-100, 0-10 Units, Subcutaneous, QID (AC + HS) PRN    LORazepam, 0.5 mg, Oral, Q8H PRN    magnesium oxide, 800 mg, Oral, PRN    magnesium oxide, 800 mg, Oral, PRN    melatonin, 6 mg, Oral, Nightly PRN    naloxone, 0.02 mg, Intravenous, PRN    ondansetron, 4 mg, Intravenous, Q6H PRN    potassium bicarbonate, 35 mEq, Oral, PRN    potassium bicarbonate, 50 mEq, Oral, PRN    potassium  bicarbonate, 60 mEq, Oral, PRN    potassium, sodium phosphates, 2 packet, Oral, PRN    potassium, sodium phosphates, 2 packet, Oral, PRN    potassium, sodium phosphates, 2 packet, Oral, PRN    senna-docusate 8.6-50 mg, 2 tablet, Oral, BID PRN    sodium chloride 0.9%, 2 mL, Intravenous, Q12H PRN       Objective:     Vital Signs (Most Recent):  Temp: 98 °F (36.7 °C) (11/05/24 0448)  Pulse: 64 (11/05/24 0805)  Resp: 18 (11/05/24 0805)  BP: (!) 125/51 (11/05/24 0448)  SpO2: 100 % (11/05/24 0805) Vital Signs (24h Range):  Temp:  [96.4 °F (35.8 °C)-98.1 °F (36.7 °C)] 98 °F (36.7 °C)  Pulse:  [64-83] 64  Resp:  [16-19] 18  SpO2:  [94 %-100 %] 100 %  BP: (100-130)/(51-63) 125/51     Weight: 49.5 kg (109 lb 2 oz)  Body mass index is 18.73 kg/m².  Body surface area is 1.5 meters squared.      Intake/Output Summary (Last 24 hours) at 11/5/2024 0812  Last data filed at 11/5/2024 0524  Gross per 24 hour   Intake 275 ml   Output 1350 ml   Net -1075 ml        Physical Exam  Constitutional:       Appearance: Normal appearance.   HENT:      Head: Normocephalic and atraumatic.   Eyes:      General: No scleral icterus.     Conjunctiva/sclera: Conjunctivae normal.   Cardiovascular:      Rate and Rhythm: Normal rate.   Pulmonary:      Effort: Pulmonary effort is normal.   Abdominal:      General: Abdomen is flat.   Neurological:      General: No focal deficit present.      Mental Status: She is alert and oriented to person, place, and time.   Psychiatric:         Mood and Affect: Mood normal.         Behavior: Behavior normal.          Significant Labs:   CBC:   Recent Labs   Lab 11/04/24  0627 11/05/24  0654   WBC 3.85* 3.76*   HGB 7.6* 8.3*   HCT 24.9* 25.5*   * 117*    and CMP:   Recent Labs   Lab 11/04/24  0627   *   K 4.4   CL 92*   CO2 33*   *   BUN 37*   CREATININE 1.4   CALCIUM 9.3   PROT 6.4   ALBUMIN 3.0*   BILITOT 0.4   ALKPHOS 57   AST 13   ALT 10   ANIONGAP 7*       Diagnostic  Results:  None  Assessment/Plan:     Multifocal pneumonia vs. pneumonitis  Pneumonitis:  She continues to feel better today.  Will continue to monitor this process.  Appreciate pulmonary assistance.      Lung cancer:  At this point all treatment is on hold.  Will see how she does on this admission prior to making any further decisions on treatment.      Anemia:  Hb is 8.3g/dl today.  Continue to monitor.     UTI:  ID is following.  Seems to be doing ok from this standpoint.         Thank you for your consult. I will follow-up with patient. Please contact us if you have any additional questions.     Virgil Lloyd MD  Hematology/Oncology  [facility]

## 2024-11-05 NOTE — CARE UPDATE
11/04/24 1925   Patient Assessment/Suction   Level of Consciousness (AVPU) alert   Respiratory Effort Normal;Unlabored   Expansion/Accessory Muscles/Retractions no use of accessory muscles;no retractions;expansion symmetric   All Lung Fields Breath Sounds diminished;coarse   PRE-TX-O2   Device (Oxygen Therapy) high flow nasal cannula   Flow (L/min) (Oxygen Therapy) 12   SpO2 99 %   Pulse Oximetry Type Intermittent   Pulse 77   Resp 16   Aerosol Therapy   $ Aerosol Therapy Charges Aerosol Treatment   Daily Review of Necessity (SVN) completed   Respiratory Treatment Status (SVN) given   Treatment Route (SVN) mask;oxygen   Patient Position HOB elevated   Post Treatment Assessment (SVN) breath sounds unchanged   Signs of Intolerance (SVN) none   Preset CPAP/BiPAP Settings   Mode Of Delivery BiPAP;Standby   $ CPAP/BiPAP Daily Charge 1   CPAP/BIPAP charged w/in last 24 h YES   $ Is patient using? No/refused   Reason patient is not wearing? Patient refused   Education   $ Education 15 min;Bronchodilator;BiPAP

## 2024-11-05 NOTE — ASSESSMENT & PLAN NOTE
Patient was started on cefepime, doxy for atypicals, and Vanc, vanc d/c'ed with negative MRSA screen  Consult Pulmonary, and Infectious Disease.    Antibiotics (From admission, onward)    Start     Stop Route Frequency Ordered    11/02/24 0200  ceFEPIme injection 1 g         -- IV Every 12 hours (non-standard times) 11/01/24 2114    11/01/24 1330  doxycycline capsule 100 mg         -- Oral Every 12 hours 11/01/24 1322    11/01/24 1300  mupirocin 2 % ointment         11/06/24 0859 Nasl 2 times daily 11/01/24 1200          Microbiology Results (last 7 days)     Procedure Component Value Units Date/Time    Blood culture x two cultures. Draw prior to antibiotics. [[id number]] Collected: 11/01/24 1045    Order Status: Completed Specimen: Blood from Peripheral, Antecubital, Left Updated: 11/05/24 1032     Blood Culture, Routine No Growth to date      No Growth to date      No Growth to date      No Growth to date      No Growth to date    Narrative:      Aerobic and anaerobic    Blood culture x two cultures. Draw prior to antibiotics. [[id number]] Collected: 11/01/24 1051    Order Status: Completed Specimen: Blood from Peripheral, Antecubital, Right Updated: 11/05/24 1032     Blood Culture, Routine No Growth to date      No Growth to date      No Growth to date      No Growth to date      No Growth to date    Narrative:      Aerobic and anaerobic    MRSA Screen by PCR [[id number]] Collected: 11/02/24 1329    Order Status: Completed Specimen: Nasal Swab Updated: 11/02/24 1532     MRSA SCREEN BY PCR Negative    Respiratory Infection Panel (PCR), Nasopharyngeal [[id number]] Collected: 11/02/24 1329    Order Status: Completed Specimen: Nasopharyngeal Swab Updated: 11/02/24 1509     Respiratory Infection Panel Source NP swab     Adenovirus Not Detected     Coronavirus 229E, Common Cold Virus Not Detected     Coronavirus HKU1, Common Cold Virus Not Detected     Coronavirus NL63, Common Cold Virus Not Detected     Coronavirus  OC43, Common Cold Virus Not Detected     Comment: Coronavirus strains 229E, HKU1, NL63, and OC43 can cause the common   cold   and are not associated with the respiratory disease outbreak caused   by  the COVID-19 (SARS-CoV-2 novel Coronavirus) strain.           SARS-CoV2 (COVID-19) Qualitative PCR Not Detected     Human Metapneumovirus Not Detected     Human Rhinovirus/Enterovirus Not Detected     Influenza A (subtypes H1, H1-2009,H3) Not Detected     Influenza B Not Detected     Parainfluenza Virus 1 Not Detected     Parainfluenza Virus 2 Not Detected     Parainfluenza Virus 3 Not Detected     Parainfluenza Virus 4 Not Detected     Respiratory Syncytial Virus Not Detected     Bordetella Parapertussis (EC3248) Not Detected     Bordetella pertussis (ptxP) Not Detected     Chlamydia pneumoniae Not Detected     Mycoplasma pneumoniae Not Detected     Comment: Respiratory Infection Panel testing performed by Multiplex PCR.       Narrative:      Respiratory Infection Panel source->NP Swab    Culture, Respiratory with Gram Stain [[id number]]     Order Status: Sent Specimen: Respiratory from Sputum, Expectorated

## 2024-11-05 NOTE — PT/OT/SLP PROGRESS
Physical Therapy Treatment    Patient Name:  Alexa Otero   MRN:  [id number]    Recommendations:     Discharge Recommendations: Moderate Intensity Therapy (vs Low w/ 24/7 assist (pending progress/oxygen requirements))  Discharge Equipment Recommendations: walker, rolling  Barriers to discharge:  O2 Requirements    Assessment:     Alexa Otero is a 84 y.o. female admitted with a medical diagnosis of Acute on chronic hypoxic respiratory failure.  She presents with the following impairments/functional limitations: weakness, impaired endurance, impaired self care skills, impaired functional mobility, gait instability, impaired balance, impaired cardiopulmonary response to activity . Pt seen with HOB elevated and agreeable to PT. O2 sats read as 98% on 10L via HFNC. She was able to mobilize to the eob min a from S/L to sitting. She presents with a L sided posterolateral lean sitting eob, but was able to correct with cueing, however could not maintain midline for long. Stand pivot t/f completed to bed side chair min a HHA. O2 saturation decreased to 70's following t/f prompting and increased in O2 to 12L. AL wanted to give the pt time to recover on 12L. Pt left up in bed side chair with AL present.     Rehab Prognosis: Fair; patient would benefit from acute skilled PT services to address these deficits and reach maximum level of function.    Recent Surgery: * No surgery found *      Plan:     During this hospitalization, patient to be seen 5 x/week to address the identified rehab impairments via gait training, therapeutic activities, therapeutic exercises and progress toward the following goals:    Plan of Care Expires:  12/02/24    Subjective     Chief Complaint: None stated  Patient/Family Comments/goals: I'm always cold.  Pain/Comfort:  Pain Rating 1: 0/10  Pain Rating Post-Intervention 1: 0/10      Objective:     Communicated with RN prior to session.  Patient found HOB elevated with bed alarm, priest catheter,  peripheral IV, telemetry upon PT entry to room.     General Precautions: Standard, fall  Orthopedic Precautions: N/A  Braces: N/A  Respiratory Status: High flow, flow 10 L/min. Increased to 12 for recovery.      Functional Mobility:  Bed Mobility:     Supine to Sit: minimum assistance  Transfers:     Bed to Chair: minimum assistance with  hand-held assist  using  Stand Pivot      AM-PAC 6 CLICK MOBILITY          Treatment & Education:  Pt was educated on the following: call light use, importance of OOB activity and functional mobility to negate the negative effects of prolonged bed rest during this hospitalization, safe transfers/ambulation and discharge planning recommendations/options.     Patient left HOB elevated with all lines intact, call button in reach. Family and AL present..    GOALS:   Multidisciplinary Problems       Physical Therapy Goals          Problem: Physical Therapy    Goal Priority Disciplines Outcome Interventions   Physical Therapy Goal     PT, PT/OT     Description: Goals to be met by: 24     Patient will increase functional independence with mobility by performin. Supine to sit with Supervision  2. Sit to stand transfer with Supervision  3. Bed to chair transfer with Supervision using Rolling Walker  4. Gait  x 100 feet with Supervision using Rolling Walker.                              Time Tracking:     PT Received On: 24  PT Start Time: 1052     PT Stop Time: 1115  PT Total Time (min): 23 min     Billable Minutes: Therapeutic Activity 23    Treatment Type: Treatment  PT/PTA: PTA     Number of PTA visits since last PT visit: 2024

## 2024-11-05 NOTE — PT/OT/SLP PROGRESS
Occupational Therapy      Patient Name:  Alexa Otero   MRN:  [id number]    Patient not seen today secondary to  (pt unable to participate due to recoveriong from O2 desaturation after PT session.). Will follow-up in pm if time permits otherwise 10/6/2024.    11/5/2024

## 2024-11-05 NOTE — ASSESSMENT & PLAN NOTE
Carbo/Pemetrexed until 4/2024 now on Pemetrexed maintenance with last dose 10/28/24  Consult pt's oncologist, Dr. Cassidy

## 2024-11-05 NOTE — PROGRESS NOTES
Pulmonary/Critical Care Progress Note      PATIENT NAME: Alexa Otero  MRN: [id number]  TODAY'S DATE: 2024  11:55 AM  ADMIT DATE: 2024  AGE: 84 y.o. 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    She reports progressive fatigue weakness prior to admission to the hospital.  She reports that she slid out of her bed and that is the reason she came to the hospital.  She reports that she has not had any fever cough night sweats, but she has some worsening shortness of breath.    11/3   - rough night, had worsening shortness of breath and is required non-rebreather in addition no nasal cannula currently she is on high-flow at 10 liters/minute     the patient is feeling a little better today.  She is on 15 L high-flow nasal cannula.  Her CT shows her chronically infiltrated right lung with the surrounding effusion.  Her left lung which was normal looking 2 weeks ago now is diffusely infiltrated with ground-glass opacities.  This must be the Alimta.     the patient is improving with the steroids.  She looks much brighter today.  She is feeling better.  She is on 10 L high-flow nasal cannula.    Review of Systems   Constitutional:  Positive for appetite change. Negative for chills, fever and unexpected weight change.   HENT:  Negative for nosebleeds, postnasal drip, trouble swallowing and voice change.    Eyes:  Negative for visual disturbance.   Respiratory:  Positive for shortness of breath. Negative for cough and wheezing.    Cardiovascular:  Negative for chest pain and leg swelling.   Gastrointestinal:  Negative  for diarrhea, nausea and vomiting.   Endocrine: Negative for cold intolerance and heat intolerance.   Genitourinary:  Negative for dysuria, flank pain and frequency (.jppe).   Musculoskeletal:  Negative for neck pain and neck stiffness.   Skin:  Positive for wound.   Allergic/Immunologic: Negative for environmental allergies and immunocompromised state.   Neurological:  Negative for syncope, speech difficulty and weakness.   Hematological:  Negative for adenopathy.   Psychiatric/Behavioral:  Negative for confusion.          No change in the patient's Past Medical History, Past Surgical History, Social History or Family History since admission.    VITAL SIGNS (MOST RECENT)  Temp: 97.5 °F (36.4 °C) (11/05/24 0820)  Pulse: 69 (11/05/24 0820)  Resp: 18 (11/05/24 0805)  BP: (!) 124/58 (11/05/24 0820)  SpO2: 100 % (11/05/24 0805)    INTAKE AND OUTPUT (LAST 24 HOURS):  Intake/Output Summary (Last 24 hours) at 11/5/2024 0852  Last data filed at 11/5/2024 0524  Gross per 24 hour   Intake 275 ml   Output 1350 ml   Net -1075 ml       WEIGHT  Wt Readings from Last 1 Encounters:   11/01/24 49.5 kg (109 lb 2 oz)         PHYSICAL EXAM  GENERAL: Older patient in no distress, looking frail.  HEENT: Pupils equal and reactive. Extraocular movements intact. Nose intact.  Pharynx moist.  NECK: Supple.   HEART: Regular rate and rhythm. No murmur or gallop auscultated.  LUNGS:  There are diminished breath sounds on the right.  On the left there are diffuse crackles.  Lung excursion symmetrical. No change in fremitus.  There is a port in thorax which is accessed.  ABDOMEN: Bowel sounds present. Non-tender, no masses palpated.  : Normal anatomy.  EXTREMITIES: Normal muscle tone and joint movement, no cyanosis or clubbing.   LYMPHATICS: No adenopathy palpated, no edema.  SKIN: Dry, intact, no lesions.  There is a stage I decubitus on the buttock.    NEURO: Cranial nerves II-XII intact. Motor strength 5/5 bilaterally, upper and lower  "extremities.  PSYCH: Appropriate affect.        CBC LAST (LAST 24 HOURS)  Recent Labs   Lab 11/05/24  0654   WBC 3.76*   RBC 2.55*   HGB 8.3*   HCT 25.5*   *   MCH 32.5*   MCHC 32.5   RDW 18.5*   *   MPV 9.3   GRAN 75.5*  2.8   LYMPH 8.5*  0.3*   MONO 13.6  0.5   BASO 0.00   NRBC 0       CHEMISTRY LAST (LAST 24 HOURS)  No results for input(s): "NA", "K", "CL", "CO2", "ANIONGAP", "BUN", "CREATININE", "GLU", "CALCIUM", "PH", "MG", "ALBUMIN", "PROT", "ALKPHOS", "ALT", "AST", "BILITOT" in the last 24 hours.        CARDIAC PROFILE (LAST 24 HOURS)  Recent Labs   Lab 11/01/24  0950 11/02/24  1335   *  --    LDH  --  413*   TROPONINIHS 46.9*  --        LAST 7 DAYS MICROBIOLOGY   Microbiology Results (last 7 days)       Procedure Component Value Units Date/Time    Blood culture x two cultures. Draw prior to antibiotics. [[id number]] Collected: 11/01/24 1051    Order Status: Completed Specimen: Blood from Peripheral, Antecubital, Right Updated: 11/04/24 1032     Blood Culture, Routine No Growth to date      No Growth to date      No Growth to date      No Growth to date    Narrative:      Aerobic and anaerobic    Blood culture x two cultures. Draw prior to antibiotics. [[id number]] Collected: 11/01/24 1045    Order Status: Completed Specimen: Blood from Peripheral, Antecubital, Left Updated: 11/04/24 1032     Blood Culture, Routine No Growth to date      No Growth to date      No Growth to date      No Growth to date    Narrative:      Aerobic and anaerobic    MRSA Screen by PCR [[id number]] Collected: 11/02/24 1329    Order Status: Completed Specimen: Nasal Swab Updated: 11/02/24 1532     MRSA SCREEN BY PCR Negative    Respiratory Infection Panel (PCR), Nasopharyngeal [[id number]] Collected: 11/02/24 1329    Order Status: Completed Specimen: Nasopharyngeal Swab Updated: 11/02/24 1509     Respiratory Infection Panel Source NP swab     Adenovirus Not Detected     Coronavirus 229E, Common Cold Virus Not " Detected     Coronavirus HKU1, Common Cold Virus Not Detected     Coronavirus NL63, Common Cold Virus Not Detected     Coronavirus OC43, Common Cold Virus Not Detected     Comment: Coronavirus strains 229E, HKU1, NL63, and OC43 can cause the common   cold   and are not associated with the respiratory disease outbreak caused   by  the COVID-19 (SARS-CoV-2 novel Coronavirus) strain.           SARS-CoV2 (COVID-19) Qualitative PCR Not Detected     Human Metapneumovirus Not Detected     Human Rhinovirus/Enterovirus Not Detected     Influenza A (subtypes H1, H1-2009,H3) Not Detected     Influenza B Not Detected     Parainfluenza Virus 1 Not Detected     Parainfluenza Virus 2 Not Detected     Parainfluenza Virus 3 Not Detected     Parainfluenza Virus 4 Not Detected     Respiratory Syncytial Virus Not Detected     Bordetella Parapertussis (YT2051) Not Detected     Bordetella pertussis (ptxP) Not Detected     Chlamydia pneumoniae Not Detected     Mycoplasma pneumoniae Not Detected     Comment: Respiratory Infection Panel testing performed by Multiplex PCR.       Narrative:      Respiratory Infection Panel source->NP Swab    Culture, Respiratory with Gram Stain [[id number]]     Order Status: Sent Specimen: Respiratory from Sputum, Expectorated             MOST RECENT IMAGING  X-Ray KUB  Narrative: EXAMINATION:  XR KUB    CLINICAL HISTORY:  abd pain/N/V;    FINDINGS:  Two abdominal radiographs at 11:05 hours compared to prior exams including CT 10/17/2024 show a nonobstructive bowel gas pattern.  There is scattered air in the colon, with moderate volume of stool in the distal colon and rectum.  No evidence of intraperitoneal free air.    Rim calcification in the right upper quadrant is unchanged, with no suspicious calcifications overlying the kidneys or ureters.  There are multiple calcified pelvic phleboliths, with right upper quadrant cholecystectomy clips.  There is rightward convex lumbar spinal curvature, with  intervertebral disc space narrowing and facet arthropathy in the spine.    The bones are diffusely osteopenic.  Right hip hardware is incompletely visualized.  There are extensive interstitial and airspace opacities in the visualized lower lungs.  Impression: Nonobstructive bowel gas pattern.    Electronically signed by: Davide Jeffrey  Date:    11/02/2024  Time:    11:18      CURRENT VISIT EKG  Results for orders placed or performed during the hospital encounter of 11/01/24   EKG 12-lead   Result Value Ref Range    QRS Duration 56 ms    [facility] QTC Calculation 482 ms    Narrative    Test Reason : R06.02,    Vent. Rate : 097 BPM     Atrial Rate : 097 BPM     P-R Int : 156 ms          QRS Dur : 056 ms      QT Int : 380 ms       P-R-T Axes : 044 -06 100 degrees     QTc Int : 482 ms    Normal sinus rhythm  Low voltage QRS  Septal infarct (cited on or before 13-OCT-2023)  Abnormal ECG  When compared with ECG of 25-OCT-2024 10:48,  ST now depressed in Lateral leads    Referred By: [email or web address]   SELF           Confirmed By:        ECHOCARDIOGRAM RESULTS  Results for orders placed during the hospital encounter of 10/07/24    Echo    Interpretation Summary    Left Ventricle: The left ventricle is normal in size. Mildly increased wall thickness. There is mild concentric hypertrophy. Moderate global hypokinesis present. There is mildly reduced systolic function with a visually estimated ejection fraction of 40 - 45%. There is normal diastolic function.    Right Ventricle: Normal right ventricular cavity size. Wall thickness is normal. Systolic function is normal.    Left Atrium: Left atrium is moderately dilated.    Mitral Valve: There is bileaflet sclerosis. There is moderate mitral annular calcification present. There is moderate stenosis. The mean pressure gradient across the mitral valve is 8 mmHg at a heart rate of  bpm. There is mild regurgitation with a centrally directed jet.    Pulmonary Artery: The estimated pulmonary artery  systolic pressure is 29 mmHg.    IVC/SVC: Normal venous pressure at 3 mmHg.        Oxygen INFORMATION       15 L           PLAN:.      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  Hopefully the steroids will settle this.  Discussed with nursing and the patient and her and daughter.    Neva Christian MD  Date of Service: 11/05/2024  11:55 AM

## 2024-11-05 NOTE — CONSULTS
Wound care consult completed by Bette Bassett today. Wound care team will follow up as needed throughout hospital stay.   sinus rhythm with 1 st degree AV Block,

## 2024-11-05 NOTE — PROGRESS NOTES
[facility] Medicine  Progress Note    Patient Name: Alexa Otero  MRN: [id number]  Patient Class: IP- Inpatient   Admission Date: 11/1/2024  Length of Stay: 4 days  Attending Physician: Callie Cherry MD  Primary Care Provider: Idalia Greco MD        Subjective:     Principal Problem:Acute on chronic hypoxic respiratory failure        HPI:  84-year-old female with a past medical history of lung cancer actively getting chemo last of which was 5 days ago, who presented to the ER because of generalized weakness.  According to the patient, she woke up today, tried to get out of bed, but felt very weak, and slid beside the bed.  Did not hit her head, and did not lose consciousness.  She however could not get up however, and her  could not assist.  911 was called.  On arrival of EMS, patient was satting 88% on 4 L, and appeared to be short of breath.  She was brought to the ER for evaluation.    In the ER, vitals showed a blood pressure of 145/65, heart rate of 103, respiratory rate of 20, afebrile satting 60% on 4 L.  Immediately patient was placed on non-rebreather.  CBC with white count of 16.8, and macrocytic anemia.  CMP showed hypokalemia of 3.4.  First troponin is elevated at 46.  Point of care Lactic acid was 2.1.  Blood cultures were collected.  EKG showed sinus rhythm.  Chest x-ray showed no significant change of bilateral diffuse mixed interstitial and airspace lung opacities.  CTA chest was done, and it was negative for PE, but showed extensive interstitial and airspace opacities throughout both lungs, having significantly worsened in the left lung compared to prior CT. Unchanged small to moderate sized bilateral pleural effusions.  Cefepime was ordered, and patient will be admitted to Medicine for further care of her severe sepsis.    Overview/Hospital Course:  Ms. Otero has been monitored closely during her hospitalization. She was admitted on 11/1/2024 with acute on  chronic hypoxemic resp failure. CTA chest reveals extensive interstitial opacities c/w multifocal pna and/or ARDS. She requires high flow nasal cannula up to 15L now on 13L. Pulm and ID have been consulted. Pulm recommends broad spectrum abx - cefepime, doxy, and vanc initially. MRSA screen negative and vanc d/c'ed. She has stage IV lung ca and completed Carbo/Pemetrexed last year and is currently on pemetrexed (Alimta) maintenance with last dose 10/28. This can cause an interstitial pneumonitis and pulm has started prednisone. Plan for bronchoscopy on Monday if no improvement and recommended continuing eliquis. BiPAP qHS and naps. Duonebs Q6h. ID has ordered a respiratory infection panel that is negative. She's had abdominal bloating and discomfort for some time and had an outpt CT abd/pelvis with gastric wall thickening that is is nonspecific could be related to gastritis. She has a history of peptic ulcers and PCP started protonix/carafate. KUB on 11/2 with nonobstructive bowel gas pattern and moderate stool burden and pt was treated with laxative suppository. She has chronic macrocytic anemia that appears at baseline. She had a leukocytosis with left shift on admission that has trended down from 16K-->12K-->8K. CRP 36, procal 2.9. Pt had a BM on 11/2, but abd remained distended. She was found to be retaining a significant amount of urine on bladder scan >900 and priest was placed with 1500mL out with resolution of abd distention. Pulm increased steriods on 11/3 to IV solumedrol, she's requiring BiPAP support and was given IV lasix. Macrocytic anemia at baseline on admission but trending down and she will receive 1 unit PRBCs 11/4. She has developed steroid induced hyperglycemia and insulin sliding titrated up to moderate dose for tighter glucose control. Priest d/c'ed on 11/5. PT/OT consulted and pt up to chair on 11/5. She desatted with movement and took some time and increased supplemental O2 to recover. Prior  to moving to the chair, O2 was weaned down to 10L high flow.    Interval History: She is dry after lasix doses with a mild CHERELLE today. Will order oral rehydration with pedialyte to gently rehydrate.      Review of Systems   Constitutional:  Positive for fatigue.   Respiratory:  Positive for cough and shortness of breath.    Gastrointestinal:  Negative for abdominal distention, constipation, nausea and vomiting.   Neurological:  Positive for weakness.     Objective:     Vital Signs (Most Recent):  Temp: 97.7 °F (36.5 °C) (11/05/24 1045)  Pulse: 75 (11/05/24 1323)  Resp: (!) 24 (11/05/24 1323)  BP: (!) 111/57 (11/05/24 1045)  SpO2: (!) 90 % (11/05/24 1323) Vital Signs (24h Range):  Temp:  [97.5 °F (36.4 °C)-98.1 °F (36.7 °C)] 97.7 °F (36.5 °C)  Pulse:  [64-78] 75  Resp:  [16-24] 24  SpO2:  [90 %-100 %] 90 %  BP: (108-125)/(51-63) 111/57     Weight: 49.5 kg (109 lb 2 oz)  Body mass index is 18.73 kg/m².    Intake/Output Summary (Last 24 hours) at 11/5/2024 1543  Last data filed at 11/5/2024 0915  Gross per 24 hour   Intake 375 ml   Output 500 ml   Net -125 ml         Physical Exam  Constitutional:       Comments: Chronically ill appearing   HENT:      Head: Normocephalic and atraumatic.      Nose: Nose normal.      Mouth/Throat:      Mouth: Mucous membranes are moist.      Pharynx: Oropharynx is clear.   Eyes:      Pupils: Pupils are equal, round, and reactive to light.   Cardiovascular:      Rate and Rhythm: Normal rate.   Pulmonary:      Effort: Pulmonary effort is normal.      Comments: Coarse and diminished throughout  Abdominal:      Comments: Abd is firm, mildly distended and nontender Bsx4 quadrants   Musculoskeletal:         General: Normal range of motion.      Cervical back: Normal range of motion and neck supple.   Skin:     General: Skin is warm.      Capillary Refill: Capillary refill takes 2 to 3 seconds.      Findings: Bruising present.   Neurological:      General: No focal deficit present.      Mental  Status: She is alert and oriented to person, place, and time.   Psychiatric:         Mood and Affect: Mood normal.         Behavior: Behavior normal.             Significant Labs: All pertinent labs within the past 24 hours have been reviewed.  CBC:   Recent Labs   Lab 11/04/24 0627 11/05/24  0654   WBC 3.85* 3.76*   HGB 7.6* 8.3*   HCT 24.9* 25.5*   * 117*     CMP:   Recent Labs   Lab 11/04/24 0627 11/05/24  0654   * 136   K 4.4 4.4   CL 92* 95   CO2 33* 33*   * 192*   BUN 37* 56*   CREATININE 1.4 1.5*   CALCIUM 9.3 8.8   PROT 6.4 5.6*   ALBUMIN 3.0* 2.5*   BILITOT 0.4 0.4   ALKPHOS 57 48*   AST 13 18   ALT 10 19   ANIONGAP 7* 8       TSH:   Recent Labs   Lab 10/07/24  1713   TSH 0.876           Significant Imaging: I have reviewed all pertinent imaging results/findings within the past 24 hours.    X-Ray KUB    Result Date: 11/2/2024  EXAMINATION: XR KUB CLINICAL HISTORY: abd pain/N/V; FINDINGS: Two abdominal radiographs at 11:05 hours compared to prior exams including CT 10/17/2024 show a nonobstructive bowel gas pattern.  There is scattered air in the colon, with moderate volume of stool in the distal colon and rectum.  No evidence of intraperitoneal free air. Rim calcification in the right upper quadrant is unchanged, with no suspicious calcifications overlying the kidneys or ureters.  There are multiple calcified pelvic phleboliths, with right upper quadrant cholecystectomy clips.  There is rightward convex lumbar spinal curvature, with intervertebral disc space narrowing and facet arthropathy in the spine. The bones are diffusely osteopenic.  Right hip hardware is incompletely visualized.  There are extensive interstitial and airspace opacities in the visualized lower lungs.     Nonobstructive bowel gas pattern. Electronically signed by: Davide Jeffrey Date:    11/02/2024 Time:    11:18    CTA Chest Non-Coronary (PE Studies)    Result Date: 11/1/2024  EXAMINATION: CTA CHEST NON CORONARY (PE  STUDIES) CLINICAL HISTORY: Pulmonary embolism (PE) suspected, high prob; lung cancer TECHNIQUE: Thin axial imaging through the chest was performed with 100 mL Omnipaque 350 IV contrast, with sagittal and coronal reformatted images and MIP reconstructions performed, with images stored in the patient's permanent electronic medical record. FINDINGS: Comparison to multiple prior exams.  There are no pulmonary arterial filling defects to suggest pulmonary thromboembolism.  The central pulmonary arteries are normal in caliber. Diffuse calcified plaque involves normal-caliber thoracic aorta, with the heart enlarged and no pericardial effusion.  There are extensive coronary arterial calcifications, with dense mitral annular calcifications.  No enlarged mediastinal or hilar lymph nodes. There is unchanged volume loss in the right hemithorax, with extensive interstitial and airspace opacities throughout both lungs, in the left lung having significantly worsened compared to the 10/07/2024.  There are scattered lucencies reflecting pulmonary emphysema.  Small to moderate sized low-density bilateral pleural effusions are unchanged, with the central airways patent.  No pneumothorax. Images of the upper abdomen show stable hepatic hypodensity, cholecystectomy clips, and scattered colon diverticula.  There are no acute fractures or destructive osseous lesions.     1. Negative for pulmonary thromboembolism. 2. Extensive interstitial and airspace opacities throughout both lungs, having significantly worsened in the left lung compared to prior CT.  Multifocal pneumonia, drug reaction, and or developing ARDS could have this appearance. 3. Unchanged small to moderate sized bilateral pleural effusions. 4. Additional observations as described. . Electronically signed by: Davide Jeffrey Date:    11/01/2024 Time:    12:51    X-Ray Chest AP Portable    Result Date: 11/1/2024  EXAMINATION: XR CHEST AP PORTABLE CLINICAL HISTORY: Sepsis; FINDINGS:  Portable chest with comparison chest x-ray 10/10/2024.  Normal cardiomediastinal silhouette.Bilateral diffuse mixed interstitial and airspace lung opacities are unchanged from previous exam.  Right subclavian Port-A-Cath is unchanged.  Pulmonary vasculature is normal. No acute osseous abnormality.     No significant change of bilateral diffuse mixed interstitial and airspace lung opacities. Electronically signed by: Kp Leiva Date:    11/01/2024 Time:    10:39        Assessment/Plan:      * Acute on chronic hypoxic respiratory failure  Likely secondary to her bilateral extensive pneumonia.  On 4L NC at home  Currently on high flow nasal cannula and intermittent bipap  Pulm consulted   CTA chest ruled out PE.  Echo done last month showed normal systolic function.   Pneumonia vs. Pneumonitis? - pulm added prednisone initially and escalated to IV solumedrol for possible Pemetrexed induced pneumonitis   Amiodarone contributing?  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 Most recent hemoglobin and hematocrit are listed below.  Recent Labs     11/03/24  0810 11/04/24  0627 11/05/24  0654   HGB 7.7* 7.6* 8.3*   HCT 24.9* 24.9* 25.5*       Plan  - Monitor serial CBC: Daily  - Transfuse PRBC if patient becomes hemodynamically unstable, symptomatic or H/H drops below 7/21.  - Patient has received 1 units of PRBCs on 11/4  - Patient's anemia is currently worsening. Will continue current treatment  - T&S in AM in the event she requires a transfusion    Multifocal pneumonia vs. pneumonitis  Patient was started on cefepime, doxy for atypicals, and Vanc, vanc d/c'ed with negative MRSA screen  Consult Pulmonary, and Infectious Disease.    Antibiotics (From admission, onward)      Start     Stop Route  Frequency Ordered    11/02/24 0200  ceFEPIme injection 1 g         -- IV Every 12 hours (non-standard times) 11/01/24 2114    11/01/24 1330  doxycycline capsule 100 mg         -- Oral Every 12 hours 11/01/24 1322    11/01/24 1300  mupirocin 2 % ointment         11/06/24 0859 Nasl 2 times daily 11/01/24 1200            Microbiology Results (last 7 days)       Procedure Component Value Units Date/Time    Blood culture x two cultures. Draw prior to antibiotics. [[id number]] Collected: 11/01/24 1045    Order Status: Completed Specimen: Blood from Peripheral, Antecubital, Left Updated: 11/05/24 1032     Blood Culture, Routine No Growth to date      No Growth to date      No Growth to date      No Growth to date      No Growth to date    Narrative:      Aerobic and anaerobic    Blood culture x two cultures. Draw prior to antibiotics. [[id number]] Collected: 11/01/24 1051    Order Status: Completed Specimen: Blood from Peripheral, Antecubital, Right Updated: 11/05/24 1032     Blood Culture, Routine No Growth to date      No Growth to date      No Growth to date      No Growth to date      No Growth to date    Narrative:      Aerobic and anaerobic    MRSA Screen by PCR [[id number]] Collected: 11/02/24 1329    Order Status: Completed Specimen: Nasal Swab Updated: 11/02/24 1532     MRSA SCREEN BY PCR Negative    Respiratory Infection Panel (PCR), Nasopharyngeal [[id number]] Collected: 11/02/24 1329    Order Status: Completed Specimen: Nasopharyngeal Swab Updated: 11/02/24 1509     Respiratory Infection Panel Source NP swab     Adenovirus Not Detected     Coronavirus 229E, Common Cold Virus Not Detected     Coronavirus HKU1, Common Cold Virus Not Detected     Coronavirus NL63, Common Cold Virus Not Detected     Coronavirus OC43, Common Cold Virus Not Detected     Comment: Coronavirus strains 229E, HKU1, NL63, and OC43 can cause the common   cold   and are not associated with the respiratory disease outbreak caused   by  the  COVID-19 (SARS-CoV-2 novel Coronavirus) strain.           SARS-CoV2 (COVID-19) Qualitative PCR Not Detected     Human Metapneumovirus Not Detected     Human Rhinovirus/Enterovirus Not Detected     Influenza A (subtypes H1, H1-2009,H3) Not Detected     Influenza B Not Detected     Parainfluenza Virus 1 Not Detected     Parainfluenza Virus 2 Not Detected     Parainfluenza Virus 3 Not Detected     Parainfluenza Virus 4 Not Detected     Respiratory Syncytial Virus Not Detected     Bordetella Parapertussis (BR3193) Not Detected     Bordetella pertussis (ptxP) Not Detected     Chlamydia pneumoniae Not Detected     Mycoplasma pneumoniae Not Detected     Comment: Respiratory Infection Panel testing performed by Multiplex PCR.       Narrative:      Respiratory Infection Panel source->NP Swab    Culture, Respiratory with Gram Stain [[id number]]     Order Status: Sent Specimen: Respiratory from Sputum, Expectorated             Pleural effusion  Bilateral, stable.   Pulm consulted  In the setting of lung ca      Malignant neoplasm of lower lobe of right lung  Carbo/Pemetrexed until 4/2024 now on Pemetrexed maintenance with last dose 10/28/24  Consult pt's oncologist, Dr. Cassidy      Atrial fibrillation  Patient has paroxysmal (<7 days) atrial fibrillation. Patient is currently in sinus rhythm. [id number] Score: 5. The patients heart rate in the last 24 hours is as follows:  Pulse  Min: 64  Max: 78     Antiarrhythmics  Amiodarone and metoprolol    Anticoagulants  Eliquis    Plan  - Replete lytes with a goal of K>4, Mg >2  - Patient is anticoagulated, see medications listed above.  - Patient's afib is currently controlled      VTE Risk Mitigation (From admission, onward)           Ordered     apixaban tablet 2.5 mg  2 times daily         11/01/24 1316     IP VTE HIGH RISK PATIENT  Once         11/01/24 1158     Place sequential compression device  Until discontinued         11/01/24 1158                    Discharge Planning    ALYCIA:      Code Status: DNR   Is the patient medically ready for discharge?:     Reason for patient still in hospital (select all that apply): Patient trending condition, Treatment, and Consult recommendations  Discharge Plan A: Home Health                  Gila Carranza NP  Department of Hospital Medicine   [facility]

## 2024-11-05 NOTE — PLAN OF CARE
Contacted [facility] regarding oxygen.  States they don't provide pt oxygen.  [facility].  Contacted [facility] at [phone].  They provide pt oxygen, has portable (pulse) and home concentrator.  States able to deliver up to 5 L oxygen.

## 2024-11-05 NOTE — ASSESSMENT & PLAN NOTE
Pneumonitis:  She continues to feel better today.  Will continue to monitor this process.  Appreciate pulmonary assistance.      Lung cancer:  At this point all treatment is on hold.  Will see how she does on this admission prior to making any further decisions on treatment.      Anemia:  Hb is 8.3g/dl today.  Continue to monitor.     UTI:  ID is following.  Seems to be doing ok from this standpoint.

## 2024-11-05 NOTE — CONSULTS
Chief complaint:  Generalized Weakness (Per EMS, patient slipped out of bed onto floor hitting bottom. Did not hit head. Realized she was weaker than normal. Hx lung cancer. Sating 60s  on 4L in triage.)      HPI:  Alexa Otero is a 84 y.o. female presenting with DTI of the BL buttocks and sacrum. Area was not photographed on admit, but patient was seen with her daughter and reported it may have been there and is just getting worse. Unable to determine if the wound was POA or HA. No other complaints today    PMH:  As per HPI and below:  Past Medical History:   Diagnosis Date    Arthritis     Cardiac arrest 10/2023    Cataract     Chronic obstructive pulmonary disease, unspecified COPD type 2024    Colon polyp     Coronary artery disease 2024    Diabetes mellitus type II     Encounter for blood transfusion     Extra-ovarian endometrioid adenocarcinoma     GERD (gastroesophageal reflux disease)     History of chronic cough     clear productive    Hyperlipidemia     Hypertension     Macular degeneration     Ovarian cancer     PONV (postoperative nausea and vomiting)     Squamous cell carcinoma 's    precancer of cervix       Social History     Socioeconomic History    Marital status:    Tobacco Use    Smoking status: Former     Current packs/day: 0.00     Average packs/day: 1 pack/day for 20.0 years (20.0 ttl pk-yrs)     Types: Cigarettes     Start date:      Quit date:      Years since quittin.8    Smokeless tobacco: Never    Tobacco comments:     quit 40 yrs ago   Substance and Sexual Activity    Alcohol use: Yes     Alcohol/week: 1.0 standard drink of alcohol     Types: 1 Glasses of wine per week     Comment: occasional    Drug use: No    Sexual activity: Not Currently     Partners: Male     Social Drivers of Health     Financial Resource Strain: Patient Declined (2024)    Overall Financial Resource Strain (CARDIA)     Difficulty of Paying Living Expenses: Patient  declined   Food Insecurity: Patient Declined (11/1/2024)    Hunger Vital Sign     Worried About Running Out of Food in the Last Year: Patient declined     Ran Out of Food in the Last Year: Patient declined   Transportation Needs: Patient Declined (11/1/2024)    TRANSPORTATION NEEDS     Transportation : Patient declined   Physical Activity: Inactive (10/8/2024)    Exercise Vital Sign     Days of Exercise per Week: 0 days     Minutes of Exercise per Session: 0 min   Stress: Patient Declined (11/1/2024)    [employer] - Occupational Stress Questionnaire     Feeling of Stress : Patient declined   Housing Stability: Patient Declined (11/1/2024)    Housing Stability Vital Sign     Unable to Pay for Housing in the Last Year: Patient declined     Homeless in the Last Year: Patient declined       Past Surgical History:   Procedure Laterality Date    AUGMENTATION OF BREAST      BRONCHOSCOPY N/A 12/12/2023    Procedure: BRONCHOSCOPY;  Surgeon: Neva Christian MD;  Location: [facility];  Service: ENT;  Laterality: N/A;    BRONCHOSCOPY WITH FLUOROSCOPY Right 05/11/2023    Procedure: BRONCHOSCOPY, WITH FLUOROSCOPY;  Surgeon: Neva Christian MD;  Location: [facility];  Service: Pulmonary;  Laterality: Right;    BRONCHOSCOPY WITH FLUOROSCOPY N/A 12/15/2023    Procedure: BRONCHOSCOPY, WITH FLUOROSCOPY;  Surgeon: Neva Christian MD;  Location: [facility];  Service: Pulmonary;  Laterality: N/A;    CATARACT EXTRACTION BILATERAL W/ ANTERIOR VITRECTOMY Bilateral     CHOLECYSTECTOMY      COLONOSCOPY      COLONOSCOPY N/A 04/20/2023    Procedure: COLONOSCOPY;  Surgeon: Sabino Stephenson MD;  Location: [facility];  Service: Endoscopy;  Laterality: N/A;    CORONARY STENT PLACEMENT  10/2023    x 3    ESOPHAGOGASTRODUODENOSCOPY N/A 06/20/2018    Procedure: EGD (ESOPHAGOGASTRODUODENOSCOPY);  Surgeon: Sabino Stephenson MD;  Location: [facility];  Service: Endoscopy;  Laterality: N/A;    ESOPHAGOGASTRODUODENOSCOPY  N/A 03/31/2023    Procedure: EGD (ESOPHAGOGASTRODUODENOSCOPY);  Surgeon: Sabino Stephenson MD;  Location: [facility] ENDO;  Service: Endoscopy;  Laterality: N/A;    ESOPHAGOGASTRODUODENOSCOPY N/A 12/11/2023    Procedure: EGD (ESOPHAGOGASTRODUODENOSCOPY);  Surgeon: Pretty Salgado MD;  Location: [facility] ENDO;  Service: Endoscopy;  Laterality: N/A;    HYSTERECTOMY  1976    partial    INJECTION OF ANESTHETIC AGENT AROUND MEDIAL BRANCH NERVES INNERVATING LUMBAR FACET JOINT Right 05/10/2023    Procedure: Block-nerve-Lateral-branch-lumbar;  Surgeon: Agustin Robles MD;  Location: [facility] OR;  Service: Pain Management;  Laterality: Right;  L5 and s1,s2 LBB    INJECTION OF ANESTHETIC AGENT AROUND MEDIAL BRANCH NERVES INNERVATING LUMBAR FACET JOINT Right 05/30/2023    Procedure: Block-nerve-medial branch-lumbar;  Surgeon: Agustin Robles MD;  Location: [facility] OR;  Service: Pain Management;  Laterality: Right;  L5, s1 ,s2 LBB #2    INJECTION OF ANESTHETIC AGENT AROUND NERVE Right 10/31/2023    Procedure: INTERCOSTAL NERVE BLOCK;  Surgeon: Washington Shankar MD;  Location: [facility] OR;  Service: Cardiothoracic;  Laterality: Right;    INJECTION, SACROILIAC JOINT Right 01/26/2023    Procedure: INJECTION,SACROILIAC JOINT;  Surgeon: Agustin Robles MD;  Location: [facility] OR;  Service: Pain Management;  Laterality: Right;    INSERTION OF TUNNELED CENTRAL VENOUS CATHETER (CVC) WITH SUBCUTANEOUS PORT Right 10/19/2020    Procedure: INSERTION, PORT-A-CATH;  Surgeon: Misti Mcfarland MD;  Location: [facility] OR;  Service: General;  Laterality: Right;    INTRAMEDULLARY RODDING OF TROCHANTER OF FEMUR Right 11/28/2021    Procedure: INSERTION, INTRAMEDULLARY LUC, FEMUR, TROCHANTER/RIGHT TFN DR FAJARDO NOTIFIED REP;  Surgeon: Kp Fajardo MD;  Location: [facility] OR;  Service: Orthopedics;  Laterality: Right;  SKIP    ROBOT-ASSISTED LAPAROSCOPIC LYMPHADENECTOMY USING DA NELLA XI N/A 09/21/2020    Procedure: XI ROBOTIC LYMPHADENECTOMY-pelvic and kell-aortic;  Surgeon:  Altagracia Ray MD;  Location: [facility] OR;  Service: OB/GYN;  Laterality: N/A;    ROBOT-ASSISTED LAPAROSCOPIC OMENTECTOMY USING DA NELLA XI N/A 09/21/2020    Procedure: XI ROBOTIC OMENTECTOMY;  Surgeon: Altagracia Ray MD;  Location: [facility] OR;  Service: OB/GYN;  Laterality: N/A;    ROBOT-ASSISTED LAPAROSCOPIC SALPINGO-OOPHORECTOMY USING DA NELLA XI Bilateral 09/21/2020    Procedure: XI ROBOTIC SALPINGO-OOPHORECTOMY;  Surgeon: Altagracia Ray MD;  Location: [facility] OR;  Service: OB/GYN;  Laterality: Bilateral;    THORACOSCOPIC BIOPSY OF PLEURA Right 10/31/2023    Procedure: VATS, WITH PLEURA BIOPSY;  Surgeon: Washington Shankar MD;  Location: [facility] OR;  Service: Cardiothoracic;  Laterality: Right;  FROZEN SECTION   **KRISTEN-ASSISDT**    THORACOTOMY Right 10/31/2023    Procedure: THORACOTOMY;  Surgeon: Washington Shankar MD;  Location: [facility] OR;  Service: Cardiothoracic;  Laterality: Right;    UPPER GASTROINTESTINAL ENDOSCOPY         Family History   Problem Relation Name Age of Onset    Cancer Mother  57        lung    Cancer Father  56        oral    Skin cancer Sister      Skin cancer Brother      Melanoma Brother      Skin cancer Brother      Breast cancer Maternal Grandmother      Breast cancer Paternal Grandmother      Colon polyps Daughter      Psoriasis Neg Hx      Lupus Neg Hx      Eczema Neg Hx      Colon cancer Neg Hx      Crohn's disease Neg Hx      Esophageal cancer Neg Hx      Stomach cancer Neg Hx      Ulcerative colitis Neg Hx         Review of patient's allergies indicates:  No Known Allergies    No current facility-administered medications on file prior to encounter.     Current Outpatient Medications on File Prior to Encounter   Medication Sig Dispense Refill    amiodarone (PACERONE) 200 MG Tab Take 1 tablet (200 mg total) by mouth 2 (two) times daily. 60 tablet 0    apixaban (ELIQUIS) 2.5 mg Tab Take 1 tablet (2.5 mg total) by mouth 2 (two) times daily. 60 tablet 0    folic acid (FOLVITE) 1 MG  tablet Take 1 tablet (1 mg total) by mouth once daily. 100 tablet 3    gabapentin (NEURONTIN) 100 MG capsule Take 1 capsule (100 mg total) by mouth 2 (two) times daily. 180 capsule 3    magnesium oxide (MAG-OX) 400 mg (241.3 mg magnesium) tablet Take 1 tablet (400 mg total) by mouth once daily. Patient requested refill 90 tablet 3    metoprolol succinate (TOPROL-XL) 25 MG 24 hr tablet Take 1 tablet (25 mg total) by mouth once daily. 90 tablet 3    pantoprazole (PROTONIX) 40 MG tablet Take 1 tablet (40 mg total) by mouth once daily. (Patient taking differently: Take 40 mg by mouth daily as needed (Heartburn).) 90 tablet 3    sucralfate (CARAFATE) 100 mg/mL suspension Take 10 mLs (1 g total) by mouth 4 (four) times daily. 473 mL 5    VIT A/VIT C/VIT E/ZINC/COPPER (ICAPS AREDS ORAL) Take 1 capsule by mouth 2 (two) times a day.       empagliflozin (JARDIANCE) 10 mg tablet Take 1 tablet (10 mg total) by mouth once daily. (Patient not taking: Reported on 11/1/2024) 90 tablet 3    furosemide (LASIX) 20 MG tablet Take 1 tablet (20 mg total) by mouth daily as needed (for lower extremity edema and shortness of breath or >3 lbs weight gain in 24 hours). 30 tablet 1    HYDROcodone-acetaminophen (NORCO) 5-325 mg per tablet Take 1 tablet by mouth every 6 (six) hours as needed for Pain. (Patient not taking: Reported on 11/1/2024)      LORazepam (ATIVAN) 1 MG tablet Take 1 tablet (1 mg total) by mouth every 6 (six) hours as needed for Anxiety (insomnia). (Patient not taking: Reported on 11/1/2024) 30 tablet 2    spironolactone (ALDACTONE) 25 MG tablet Take 0.5 tablets (12.5 mg total) by mouth once daily. 45 tablet 3       ROS: As per HPI and below:  Pertinent items are noted in HPI.      Physical Exam:     Vitals:    11/05/24 0820 11/05/24 1045 11/05/24 1323 11/05/24 1554   BP: (!) 124/58 (!) 111/57  112/62   Pulse: 69 72 75 67   Resp:  19 (!) 24 19   Temp: 97.5 °F (36.4 °C) 97.7 °F (36.5 °C)  97.5 °F (36.4 °C)   TempSrc: Oral    "Oral   SpO2:  98% (!) 90% 98%   Weight:       Height:           BP  Min: 100/52  Max: 178/78  Temp  Av.7 °F (36.5 °C)  Min: 96.4 °F (35.8 °C)  Max: 99.9 °F (37.7 °C)  Pulse  Av.2  Min: 64  Max: 103  Resp  Av.8  Min: 12  Max: 25  SpO2  Av.2 %  Min: 60 %  Max: 100 %  Height  Av' 4" (162.6 cm)  Min: 5' 4" (162.6 cm)  Max: 5' 4" (162.6 cm)  Weight  Av.2 kg (112 lb 15.2 oz)  Min: 49.5 kg (109 lb 2 oz)  Max: 52.1 kg (114 lb 13.8 oz)    Body mass index is 18.73 kg/m².          General:             Well developed, well nourished, no apparent distress  HEENT:              NCAT, no JVD, mucous membranes moist, EOM intact  Cardiovascular:  Regular rate and rhythm, normal S1, normal S2, No murmurs, rubs, or gallops  Respiratory:        Normal breath sounds, no wheezes, no rales, no rhonchi  Abdomen:           Bowel sounds present, non tender, non distended, no masses, no hepatojugular reflux  Extremities:        No clubbing, no cyanosis, no edema  Vascular:            2+ b/l radial.  Peripheral pulses intact.  No carotid bruits.  Neurological:      No focal deficits  Skin:                   No obvious rashes or erythema, DTI of the BL buttocks and sacrum      Lab Results   Component Value Date    WBC 3.76 (L) 2024    HGB 8.3 (L) 2024    HCT 25.5 (L) 2024     (H) 2024     (L) 2024     Lab Results   Component Value Date    CHOL 220 (H) 2024    CHOL 200 (H) 2023    CHOL 169 2023     Lab Results   Component Value Date    HDL 55 2024    HDL 44 2023    HDL 57 2023     Lab Results   Component Value Date    LDLCALC 148.0 2024    LDLCALC 137.2 2023    LDLCALC 98.2 2023     Lab Results   Component Value Date    TRIG 85 2024    TRIG 94 2023    TRIG 69 2023     Lab Results   Component Value Date    CHOLHDL 25.0 2024    CHOLHDL 22.0 2023    CHOLHDL 33.7 2023     CMP  Recent Labs "   Lab 11/05/24  0654   *   CALCIUM 8.8   ALBUMIN 2.5*   PROT 5.6*      K 4.4   CO2 33*   CL 95   BUN 56*   CREATININE 1.5*   ALKPHOS 48*   ALT 19   AST 18   BILITOT 0.4      Lab Results   Component Value Date    TSH 0.876 10/07/2024           Assessment and Recommendations       Diagnoses:    DTI of the BL buttocks and sacrum    Plan:  Triad to the BL buttocks and sacrum daily    Complexity:    moderate

## 2024-11-05 NOTE — PLAN OF CARE
Problem: Adult Inpatient Plan of Care  Goal: Plan of Care Review  Outcome: Progressing  Goal: Patient-Specific Goal (Individualized)  Outcome: Progressing  Goal: Absence of Hospital-Acquired Illness or Injury  Outcome: Progressing  Goal: Optimal Comfort and Wellbeing  Outcome: Progressing  Goal: Readiness for Transition of Care  Outcome: Progressing     Problem: Sepsis/Septic Shock  Goal: Optimal Coping  Outcome: Progressing  Goal: Absence of Bleeding  Outcome: Progressing  Goal: Blood Glucose Level Within Targeted Range  Outcome: Progressing  Goal: Absence of Infection Signs and Symptoms  Outcome: Progressing  Goal: Optimal Nutrition Intake  Outcome: Progressing     Problem: Pneumonia  Goal: Fluid Balance  Outcome: Progressing  Goal: Resolution of Infection Signs and Symptoms  Outcome: Progressing  Goal: Effective Oxygenation and Ventilation  Outcome: Progressing     Problem: Fall Injury Risk  Goal: Absence of Fall and Fall-Related Injury  Outcome: Progressing     Problem: Skin Injury Risk Increased  Goal: Skin Health and Integrity  Outcome: Progressing     Problem: Infection  Goal: Absence of Infection Signs and Symptoms  Outcome: Progressing     Problem: Gas Exchange Impaired  Goal: Optimal Gas Exchange  Outcome: Progressing     Problem: Wound  Goal: Optimal Coping  Outcome: Progressing  Goal: Optimal Functional Ability  Outcome: Progressing  Goal: Absence of Infection Signs and Symptoms  Outcome: Progressing  Goal: Improved Oral Intake  Outcome: Progressing  Goal: Optimal Pain Control and Function  Outcome: Progressing  Goal: Skin Health and Integrity  Outcome: Progressing  Goal: Optimal Wound Healing  Outcome: Progressing

## 2024-11-05 NOTE — ASSESSMENT & PLAN NOTE
Patient has paroxysmal (<7 days) atrial fibrillation. Patient is currently in sinus rhythm. [id number] Score: 5. The patients heart rate in the last 24 hours is as follows:  Pulse  Min: 64  Max: 78     Antiarrhythmics  Amiodarone and metoprolol    Anticoagulants  Eliquis    Plan  - Replete lytes with a goal of K>4, Mg >2  - Patient is anticoagulated, see medications listed above.  - Patient's afib is currently controlled

## 2024-11-05 NOTE — CONSULTS
[facility]  Hematology/Oncology  Consult Note    Patient Name: Alexa Otero  MRN: [id number]  Admission Date: 11/1/2024  Hospital Length of Stay: 4 days  Code Status: DNR   Attending Provider: Callie Cherry MD  Consulting Provider: Virgil Lloyd MD  Primary Care Physician: Idalia Greco MD  Principal Problem:Acute on chronic hypoxic respiratory failure    Consults  Subjective:     HPI:  Ms. Otero is a 83yo female known to me with a diagnosis of stage IV lung cancer who has been under treatment with chemotherapy-Alimta-single agent.  Additionally, she does have a history of COPD, CAD and DM as well as a history of endometrial carcinoma.  Oncology history shown below.  She is admitted with a cc of increasing fatigue and sob and found to have in increase in diffuse infiltrative changes on her CT scan.  She is being evaluated by pulmonary medicine with differential of pneumonia, pneumonitis, vs progressive malignancy.  Also being considered is alimta induced pulmonary disease.      She is on NC oxygen and this morning states that she is actually feeling better then when she was admitted.  She has tested negative for corona and flu and blood cultures have been negative.  She does however, have E.Coli growing in her urine.  ID is following as well.              ONCOLOGY HISTORY  9/21/2020  Bilateral oophorectomy  Right endometrioid Carcinoma  [id number]  S/p Carbo/Taxol x 6     8/15/2023 PET:  IMPRESSION:  1. Multifocal right lower lobe and right pleural FDG hypermetabolism as described, with associated right lower lobe consolidation. These are nonspecific and could be of infectious or inflammatory etiology in the clinical setting of chronic pneumonia, and or metastatic disease. Correlation with previous bronchoscopy results is needed.  2. Multiple new non hypermetabolic pulmonary nodules in both lungs, which are generally below size resolution for PET, but suspicious for pulmonary  metastases.  3. No additional findings of FDG avid metastatic disease.     10/31/2023 RLL Lobectomy:  LUNG SYNOPTIC REPORT   PROCEDURE:     Lobectomy.   SPECIMEN LATERALITY:    Right   TUMOR SITE:     Lower lobe.   TUMOR SIZE:     Cannot be determined, 4.5 cm area of cavitation but    tumor appears to involve most if not all of the resected lobe.   TUMOR FOCALITY:    Single tumor.   HISTOLOGIC TYPE:    Invasive mucinous adenocarcinoma with pneumonic    pattern.   VISCEROPLEURAL INVASION:   Present   LYMPHOVASCULAR INVASION:   Not identified.   SPREAD OF TUMOR THROUGH AIR SPACES:  Present   DIRECT INVASION OF ADJACENT STRUCTURES: No adjacent structures present.   MARGINS:     All margins are uninvolved by tumor, margins examined:         Bronchial, distance of invasion of tumor from closest         margin: cannot be determined.   TREATMENT EFFECT:    No known presurgical therapy.   ADDITIONAL PATHOLOGIC FINDINGS:  Organized pulmonary emboli, pulmonary    infarct.   REGIONAL LYMPH NODES:    NUMBER OF LYMPH NODES INVOLVED:  Zero, number of lymph nodes examined:     1, yue         structures examined: 10 (hilar)   PATHOLOGIC STAGE CLASSIFICATION    OF PRIMARY TUMOR:    pT3    REGIONAL LYMPH NODES:   pN0      Comment: The sections show invasive mucinous adenocarcinoma with    findings that suggest so-called pneumonic pattern. there is diffuse    involvement with tumor present in all the histologic sections, often    with a lepidic pattern suggesting spread through the air spaces. The    reported CT findings of a consolidated appearance correlates with the    histologic findings and the reported clinical presentation of a    year-long cough productive of purulent mucus. Mucus is also a common    presentation of this somewhat uncommon pulmonary malignancy. These    tumors are two complete stages pT3 when there appears to be involvement    of the entire lobe; this appears to be appropriate in this case. There    are also  organizing/organized pulmonary emboli within the vasculature,    possibly indicating hypercoagulability of malignancy as can be seen    especially with mucinous tumors. The tissue sampled in block 2F is    likely an infarct related to this. The history of endometrioid    adenocarcinoma is noted, but the current tumor represents a primary    lung malignancy and is unrelated to the prior cancer.      10/13/2023-Echo:  Normal systolic function with EF 55-60%  Grade I diastolic dysfunction     12/10/2023-CT CAP:  Progression of the alveolar consolidation in the right mid and lower lung with moderate size right pleural effusion. There is progression of the airspace disease within the right upper lobe with patchy groundglass opacity left upper lobe and left lung base. Findings are compatible with multifocal.      12/15/2023-Bronchoscopy:  LUNG, RIGHT MIDDLE LOBE, BIOPSY:   - ATYPICAL ADENOMATOUS HYPERPLASIA.   - IN A BACKGROUND OF REACTIVE FIBROSIS AND FIBRIN.     Comment:   Given the patient's history, this lesion is concerning for well    differentiated lepidic type adenocarcinoma but is quantitatively    insufficient (approximately 1 mm) for a definite diagnosis.     Multiple additional leveled sections were examined.      Carbo/Pemetrexed x 4  1/25/2024-4/11/2024     Maintenance Pemetrexed:  Cycle 6:7/8/2024  Cycle 7:8/19/2024  Cycle 8:9/9/2024  Cycle 9:9/30/2024  Cycle 10:10/28/2024- Due        4/23/2024-PET:  1. Interval improvement in hypermetabolic right lung airspace opacities, presumably reflecting infectious or inflammatory etiology.  2. No convincing evidence of recurrent neoplasm or metastatic disease.  3. Interval increased size of right pleural effusion.  4. Additional stable observations as described.     10/1/2024-PET:  Lung opacities improved  No new disease     9/25/2024  Newly diagnosed with afib    Oncology Treatment Plan:   OP NSCLC PEMETREXED + CARBOPLATIN (AUC) Q3W    Medications:  Continuous  Infusions:  Scheduled Meds:   albuterol-ipratropium  3 mL Nebulization Q6H WAKE    amiodarone  200 mg Oral BID    apixaban  2.5 mg Oral BID    ceFEPime IV (PEDS and ADULTS)  1 g Intravenous Q12H    doxycycline  100 mg Oral Q12H    folic acid  1 mg Oral Daily    gabapentin  100 mg Oral BID    magnesium oxide  400 mg Oral Daily    methylPREDNISolone injection (PEDS and ADULTS)  40 mg Intravenous Q8H    metoprolol succinate  25 mg Oral Daily    mupirocin   Nasal BID    pantoprazole  40 mg Oral Daily    polyethylene glycol  17 g Oral Daily    sucralfate  1 g Oral QID     PRN Meds:  Current Facility-Administered Medications:     0.9%  NaCl infusion (for blood administration), , Intravenous, Q24H PRN    acetaminophen, 650 mg, Oral, Q8H PRN    acetaminophen, 650 mg, Oral, Q4H PRN    aluminum-magnesium hydroxide-simethicone, 30 mL, Oral, QID PRN    dextrose 50%, 12.5 g, Intravenous, PRN    dextrose 50%, 25 g, Intravenous, PRN    glucagon (human recombinant), 1 mg, Intramuscular, PRN    glucose, 16 g, Oral, PRN    glucose, 24 g, Oral, PRN    HYDROcodone-acetaminophen, 1 tablet, Oral, Q6H PRN    insulin aspart U-100, 0-10 Units, Subcutaneous, QID (AC + HS) PRN    LORazepam, 0.5 mg, Oral, Q8H PRN    magnesium oxide, 800 mg, Oral, PRN    magnesium oxide, 800 mg, Oral, PRN    melatonin, 6 mg, Oral, Nightly PRN    naloxone, 0.02 mg, Intravenous, PRN    ondansetron, 4 mg, Intravenous, Q6H PRN    potassium bicarbonate, 35 mEq, Oral, PRN    potassium bicarbonate, 50 mEq, Oral, PRN    potassium bicarbonate, 60 mEq, Oral, PRN    potassium, sodium phosphates, 2 packet, Oral, PRN    potassium, sodium phosphates, 2 packet, Oral, PRN    potassium, sodium phosphates, 2 packet, Oral, PRN    senna-docusate 8.6-50 mg, 2 tablet, Oral, BID PRN    sodium chloride 0.9%, 2 mL, Intravenous, Q12H PRN     Review of patient's allergies indicates:  No Known Allergies     Past Medical History:   Diagnosis Date    Arthritis     Cardiac arrest 10/2023     Cataract     Chronic obstructive pulmonary disease, unspecified COPD type 03/20/2024    Colon polyp     Coronary artery disease 03/20/2024    Diabetes mellitus type II 2012    Encounter for blood transfusion     Extra-ovarian endometrioid adenocarcinoma 2020    GERD (gastroesophageal reflux disease)     History of chronic cough     clear productive    Hyperlipidemia     Hypertension     Macular degeneration     Ovarian cancer     PONV (postoperative nausea and vomiting)     Squamous cell carcinoma 1980's    precancer of cervix     Past Surgical History:   Procedure Laterality Date    AUGMENTATION OF BREAST      BRONCHOSCOPY N/A 12/12/2023    Procedure: BRONCHOSCOPY;  Surgeon: Neva Christian MD;  Location: [facility];  Service: ENT;  Laterality: N/A;    BRONCHOSCOPY WITH FLUOROSCOPY Right 05/11/2023    Procedure: BRONCHOSCOPY, WITH FLUOROSCOPY;  Surgeon: Neva Christian MD;  Location: [facility] ENDO;  Service: Pulmonary;  Laterality: Right;    BRONCHOSCOPY WITH FLUOROSCOPY N/A 12/15/2023    Procedure: BRONCHOSCOPY, WITH FLUOROSCOPY;  Surgeon: Neva Christian MD;  Location: [facility];  Service: Pulmonary;  Laterality: N/A;    CATARACT EXTRACTION BILATERAL W/ ANTERIOR VITRECTOMY Bilateral     CHOLECYSTECTOMY      COLONOSCOPY      COLONOSCOPY N/A 04/20/2023    Procedure: COLONOSCOPY;  Surgeon: Sabino Stephenson MD;  Location: [facility];  Service: Endoscopy;  Laterality: N/A;    CORONARY STENT PLACEMENT  10/2023    x 3    ESOPHAGOGASTRODUODENOSCOPY N/A 06/20/2018    Procedure: EGD (ESOPHAGOGASTRODUODENOSCOPY);  Surgeon: Sabino Stephenson MD;  Location: [facility];  Service: Endoscopy;  Laterality: N/A;    ESOPHAGOGASTRODUODENOSCOPY N/A 03/31/2023    Procedure: EGD (ESOPHAGOGASTRODUODENOSCOPY);  Surgeon: Sabino Stephenson MD;  Location: [facility];  Service: Endoscopy;  Laterality: N/A;    ESOPHAGOGASTRODUODENOSCOPY N/A 12/11/2023    Procedure: EGD (ESOPHAGOGASTRODUODENOSCOPY);  Surgeon: Pretty Salgado MD;  Location:  [facility] ENDO;  Service: Endoscopy;  Laterality: N/A;    HYSTERECTOMY  1976    partial    INJECTION OF ANESTHETIC AGENT AROUND MEDIAL BRANCH NERVES INNERVATING LUMBAR FACET JOINT Right 05/10/2023    Procedure: Block-nerve-Lateral-branch-lumbar;  Surgeon: Agustin Robles MD;  Location: [facility] OR;  Service: Pain Management;  Laterality: Right;  L5 and s1,s2 LBB    INJECTION OF ANESTHETIC AGENT AROUND MEDIAL BRANCH NERVES INNERVATING LUMBAR FACET JOINT Right 05/30/2023    Procedure: Block-nerve-medial branch-lumbar;  Surgeon: Agustin Robles MD;  Location: [facility] OR;  Service: Pain Management;  Laterality: Right;  L5, s1 ,s2 LBB #2    INJECTION OF ANESTHETIC AGENT AROUND NERVE Right 10/31/2023    Procedure: INTERCOSTAL NERVE BLOCK;  Surgeon: Washington Shankar MD;  Location: [facility] OR;  Service: Cardiothoracic;  Laterality: Right;    INJECTION, SACROILIAC JOINT Right 01/26/2023    Procedure: INJECTION,SACROILIAC JOINT;  Surgeon: Agustin Robles MD;  Location: [facility] OR;  Service: Pain Management;  Laterality: Right;    INSERTION OF TUNNELED CENTRAL VENOUS CATHETER (CVC) WITH SUBCUTANEOUS PORT Right 10/19/2020    Procedure: INSERTION, PORT-A-CATH;  Surgeon: Misti Mcfarland MD;  Location: [facility] OR;  Service: General;  Laterality: Right;    INTRAMEDULLARY RODDING OF TROCHANTER OF FEMUR Right 11/28/2021    Procedure: INSERTION, INTRAMEDULLARY ULC, FEMUR, TROCHANTER/RIGHT TFN DR FAJARDO NOTIFIED REP;  Surgeon: Kp Fajardo MD;  Location: [facility] OR;  Service: Orthopedics;  Laterality: Right;  SKIP    ROBOT-ASSISTED LAPAROSCOPIC LYMPHADENECTOMY USING DA NELLA XI N/A 09/21/2020    Procedure: XI ROBOTIC LYMPHADENECTOMY-pelvic and kell-aortic;  Surgeon: Altagracia Ray MD;  Location: [facility] OR;  Service: OB/GYN;  Laterality: N/A;    ROBOT-ASSISTED LAPAROSCOPIC OMENTECTOMY USING DA NELLA XI N/A 09/21/2020    Procedure: XI ROBOTIC OMENTECTOMY;  Surgeon: Altagracia Ray MD;  Location: [facility] OR;  Service: OB/GYN;  Laterality: N/A;     ROBOT-ASSISTED LAPAROSCOPIC SALPINGO-OOPHORECTOMY USING DA NELLA XI Bilateral 2020    Procedure: XI ROBOTIC SALPINGO-OOPHORECTOMY;  Surgeon: Altagracia Ray MD;  Location: [facility];  Service: OB/GYN;  Laterality: Bilateral;    THORACOSCOPIC BIOPSY OF PLEURA Right 10/31/2023    Procedure: VATS, WITH PLEURA BIOPSY;  Surgeon: Washington Shankar MD;  Location: [facility] OR;  Service: Cardiothoracic;  Laterality: Right;  FROZEN SECTION   **KRISTEN-ASSISDT**    THORACOTOMY Right 10/31/2023    Procedure: THORACOTOMY;  Surgeon: Washington Shankar MD;  Location: [facility] OR;  Service: Cardiothoracic;  Laterality: Right;    UPPER GASTROINTESTINAL ENDOSCOPY       Family History       Problem Relation (Age of Onset)    Breast cancer Maternal Grandmother, Paternal Grandmother    Cancer Mother (57), Father (56)    Colon polyps Daughter    Melanoma Brother    Skin cancer Sister, Brother, Brother          Tobacco Use    Smoking status: Former     Current packs/day: 0.00     Average packs/day: 1 pack/day for 20.0 years (20.0 ttl pk-yrs)     Types: Cigarettes     Start date:      Quit date: 1981     Years since quittin.8    Smokeless tobacco: Never    Tobacco comments:     quit 40 yrs ago   Substance and Sexual Activity    Alcohol use: Yes     Alcohol/week: 1.0 standard drink of alcohol     Types: 1 Glasses of wine per week     Comment: occasional    Drug use: No    Sexual activity: Not Currently     Partners: Male       Review of Systems   Constitutional:  Negative for fever.   Respiratory:  Positive for shortness of breath.    Cardiovascular:  Negative for chest pain and leg swelling.   Gastrointestinal:  Negative for abdominal pain and blood in stool.   Genitourinary:  Negative for hematuria.   Skin:  Negative for rash.     Objective:     Vital Signs (Most Recent):  Temp: 98 °F (36.7 °C) (24)  Pulse: 64 (24)  Resp: 18 (24)  BP: (!) 125/51 (24)  SpO2: 100 % (24) Vital  Signs (24h Range):  Temp:  [96.4 °F (35.8 °C)-98.1 °F (36.7 °C)] 98 °F (36.7 °C)  Pulse:  [64-83] 64  Resp:  [16-19] 18  SpO2:  [94 %-100 %] 100 %  BP: (100-130)/(51-63) 125/51     Weight: 49.5 kg (109 lb 2 oz)  Body mass index is 18.73 kg/m².  Body surface area is 1.5 meters squared.      Intake/Output Summary (Last 24 hours) at 11/5/2024 0807  Last data filed at 11/5/2024 0524  Gross per 24 hour   Intake 275 ml   Output 1350 ml   Net -1075 ml        Physical Exam  Constitutional:       Appearance: Normal appearance.   HENT:      Head: Normocephalic and atraumatic.   Eyes:      General: No scleral icterus.     Conjunctiva/sclera: Conjunctivae normal.   Cardiovascular:      Rate and Rhythm: Normal rate.   Pulmonary:      Effort: Pulmonary effort is normal.   Abdominal:      General: Abdomen is flat.   Neurological:      General: No focal deficit present.      Mental Status: She is alert and oriented to person, place, and time.   Psychiatric:         Mood and Affect: Mood normal.         Behavior: Behavior normal.          Significant Labs:   CBC:   Recent Labs   Lab 11/03/24  0810 11/04/24  0627 11/05/24  0654   WBC 8.39 3.85* 3.76*   HGB 7.7* 7.6* 8.3*   HCT 24.9* 24.9* 25.5*    137* 117*    and CMP:   Recent Labs   Lab 11/03/24  0810 11/04/24  0627    132*   K 4.3 4.4   CL 96 92*   CO2 33* 33*   * 230*   BUN 28* 37*   CREATININE 1.1 1.4   CALCIUM 8.8 9.3   PROT 6.0 6.4   ALBUMIN 2.7* 3.0*   BILITOT 0.4 0.4   ALKPHOS 50* 57   AST 13 13   ALT 9* 10   ANIONGAP 8 7*       Diagnostic Results:  None  Assessment/Plan:     Multifocal pneumonia vs. pneumonitis  Pneumonitis:  Patient does appear to be getting somewhat better.  I discussed the possibility that this could be from chemotherapy.  I will say this is a very rare event but is nevertheless, possible.  I don't think it would be wise to use this drug further.  Will continue to monitor and hope for improvement.  Continue current care per pulmonary  and ID.     Lung cancer:  At this point all treatment is on hold.  Will see how she does on this admission prior to making any further decisions on treatment.      Anemia:  Likely chemotherapy induced.  If this falls any further then a unit of blood my be reasonable, joao in light of her pulmonary status.      UTI:  ID is following.  Seems to be doing ok from this standpoint.         Thank you for your consult. I will follow-up with patient. Please contact us if you have any additional questions.    Virgil Lloyd MD  Hematology/Oncology  Formerly [facility]

## 2024-11-05 NOTE — ASSESSMENT & PLAN NOTE
Constipation likely contributing  She had a couple of Bms and started bowel regimen  Priest placed for significant retention   D/C priest today

## 2024-11-05 NOTE — SUBJECTIVE & OBJECTIVE
Interval History: She is dry after lasix doses with a mild CHERELLE today. Will order oral rehydration with pedialyte to gently rehydrate.      Review of Systems   Constitutional:  Positive for fatigue.   Respiratory:  Positive for cough and shortness of breath.    Gastrointestinal:  Negative for abdominal distention, constipation, nausea and vomiting.   Neurological:  Positive for weakness.     Objective:     Vital Signs (Most Recent):  Temp: 97.7 °F (36.5 °C) (11/05/24 1045)  Pulse: 75 (11/05/24 1323)  Resp: (!) 24 (11/05/24 1323)  BP: (!) 111/57 (11/05/24 1045)  SpO2: (!) 90 % (11/05/24 1323) Vital Signs (24h Range):  Temp:  [97.5 °F (36.4 °C)-98.1 °F (36.7 °C)] 97.7 °F (36.5 °C)  Pulse:  [64-78] 75  Resp:  [16-24] 24  SpO2:  [90 %-100 %] 90 %  BP: (108-125)/(51-63) 111/57     Weight: 49.5 kg (109 lb 2 oz)  Body mass index is 18.73 kg/m².    Intake/Output Summary (Last 24 hours) at 11/5/2024 1543  Last data filed at 11/5/2024 0915  Gross per 24 hour   Intake 375 ml   Output 500 ml   Net -125 ml         Physical Exam  Constitutional:       Comments: Chronically ill appearing   HENT:      Head: Normocephalic and atraumatic.      Nose: Nose normal.      Mouth/Throat:      Mouth: Mucous membranes are moist.      Pharynx: Oropharynx is clear.   Eyes:      Pupils: Pupils are equal, round, and reactive to light.   Cardiovascular:      Rate and Rhythm: Normal rate.   Pulmonary:      Effort: Pulmonary effort is normal.      Comments: Coarse and diminished throughout  Abdominal:      Comments: Abd is firm, mildly distended and nontender Bsx4 quadrants   Musculoskeletal:         General: Normal range of motion.      Cervical back: Normal range of motion and neck supple.   Skin:     General: Skin is warm.      Capillary Refill: Capillary refill takes 2 to 3 seconds.      Findings: Bruising present.   Neurological:      General: No focal deficit present.      Mental Status: She is alert and oriented to person, place, and time.    Psychiatric:         Mood and Affect: Mood normal.         Behavior: Behavior normal.             Significant Labs: All pertinent labs within the past 24 hours have been reviewed.  CBC:   Recent Labs   Lab 11/04/24 0627 11/05/24  0654   WBC 3.85* 3.76*   HGB 7.6* 8.3*   HCT 24.9* 25.5*   * 117*     CMP:   Recent Labs   Lab 11/04/24 0627 11/05/24  0654   * 136   K 4.4 4.4   CL 92* 95   CO2 33* 33*   * 192*   BUN 37* 56*   CREATININE 1.4 1.5*   CALCIUM 9.3 8.8   PROT 6.4 5.6*   ALBUMIN 3.0* 2.5*   BILITOT 0.4 0.4   ALKPHOS 57 48*   AST 13 18   ALT 10 19   ANIONGAP 7* 8       TSH:   Recent Labs   Lab 10/07/24  1713   TSH 0.876           Significant Imaging: I have reviewed all pertinent imaging results/findings within the past 24 hours.    X-Ray KUB    Result Date: 11/2/2024  EXAMINATION: XR KUB CLINICAL HISTORY: abd pain/N/V; FINDINGS: Two abdominal radiographs at 11:05 hours compared to prior exams including CT 10/17/2024 show a nonobstructive bowel gas pattern.  There is scattered air in the colon, with moderate volume of stool in the distal colon and rectum.  No evidence of intraperitoneal free air. Rim calcification in the right upper quadrant is unchanged, with no suspicious calcifications overlying the kidneys or ureters.  There are multiple calcified pelvic phleboliths, with right upper quadrant cholecystectomy clips.  There is rightward convex lumbar spinal curvature, with intervertebral disc space narrowing and facet arthropathy in the spine. The bones are diffusely osteopenic.  Right hip hardware is incompletely visualized.  There are extensive interstitial and airspace opacities in the visualized lower lungs.     Nonobstructive bowel gas pattern. Electronically signed by: Davide Jeffrey Date:    11/02/2024 Time:    11:18    CTA Chest Non-Coronary (PE Studies)    Result Date: 11/1/2024  EXAMINATION: CTA CHEST NON CORONARY (PE STUDIES) CLINICAL HISTORY: Pulmonary embolism (PE) suspected,  high prob; lung cancer TECHNIQUE: Thin axial imaging through the chest was performed with 100 mL Omnipaque 350 IV contrast, with sagittal and coronal reformatted images and MIP reconstructions performed, with images stored in the patient's permanent electronic medical record. FINDINGS: Comparison to multiple prior exams.  There are no pulmonary arterial filling defects to suggest pulmonary thromboembolism.  The central pulmonary arteries are normal in caliber. Diffuse calcified plaque involves normal-caliber thoracic aorta, with the heart enlarged and no pericardial effusion.  There are extensive coronary arterial calcifications, with dense mitral annular calcifications.  No enlarged mediastinal or hilar lymph nodes. There is unchanged volume loss in the right hemithorax, with extensive interstitial and airspace opacities throughout both lungs, in the left lung having significantly worsened compared to the 10/07/2024.  There are scattered lucencies reflecting pulmonary emphysema.  Small to moderate sized low-density bilateral pleural effusions are unchanged, with the central airways patent.  No pneumothorax. Images of the upper abdomen show stable hepatic hypodensity, cholecystectomy clips, and scattered colon diverticula.  There are no acute fractures or destructive osseous lesions.     1. Negative for pulmonary thromboembolism. 2. Extensive interstitial and airspace opacities throughout both lungs, having significantly worsened in the left lung compared to prior CT.  Multifocal pneumonia, drug reaction, and or developing ARDS could have this appearance. 3. Unchanged small to moderate sized bilateral pleural effusions. 4. Additional observations as described. . Electronically signed by: Davide Jeffrey Date:    11/01/2024 Time:    12:51    X-Ray Chest AP Portable    Result Date: 11/1/2024  EXAMINATION: XR CHEST AP PORTABLE CLINICAL HISTORY: Sepsis; FINDINGS: Portable chest with comparison chest x-ray 10/10/2024.   Normal cardiomediastinal silhouette.Bilateral diffuse mixed interstitial and airspace lung opacities are unchanged from previous exam.  Right subclavian Port-A-Cath is unchanged.  Pulmonary vasculature is normal. No acute osseous abnormality.     No significant change of bilateral diffuse mixed interstitial and airspace lung opacities. Electronically signed by: Kp Leiva Date:    11/01/2024 Time:    10:39

## 2024-11-05 NOTE — ASSESSMENT & PLAN NOTE
Pneumonitis:  Patient does appear to be getting somewhat better.  I discussed the possibility that this could be from chemotherapy.  I will say this is a very rare event but is nevertheless, possible.  I don't think it would be wise to use this drug further.  Will continue to monitor and hope for improvement.  Continue current care per pulmonary and ID.     Lung cancer:  At this point all treatment is on hold.  Will see how she does on this admission prior to making any further decisions on treatment.      Anemia:  Likely chemotherapy induced.  If this falls any further then a unit of blood my be reasonable, joao in light of her pulmonary status.      UTI:  ID is following.  Seems to be doing ok from this standpoint.

## 2024-11-06 NOTE — PT/OT/SLP EVAL
Occupational Therapy   Evaluation    Name: Alexa Otero  MRN: [id number]  Admitting Diagnosis: Acute on chronic hypoxic respiratory failure  Recent Surgery: * No surgery found *      Recommendations:     Discharge Recommendations: Moderate Intensity Therapy  Discharge Equipment Recommendations:   (to be determined by next level of care)  Barriers to discharge:  Decreased caregiver support (Increased assistance with ADLs, fall risk)    Assessment:     Alexa Otero is a 84 y.o. female with a medical diagnosis of Acute on chronic hypoxic respiratory failure. Performance deficits affecting function: weakness, impaired endurance, impaired self care skills, impaired functional mobility, gait instability, impaired balance, decreased upper extremity function, decreased lower extremity function, impaired cardiopulmonary response to activity.      Rehab Prognosis: Good; patient would benefit from acute skilled OT services to address these deficits and reach maximum level of function.       Plan:     Patient to be seen 6 x/week to address the above listed problems via self-care/home management, therapeutic activities, therapeutic exercises  Plan of Care Expires: 12/06/24  Plan of Care Reviewed with: patient    Subjective     Chief Complaint: She lives with her  who is not in good health either and is not able to provide needed care for her if necessary.  Patient/Family Comments/goals: To go to a SNF to get stronger before she returns home.    Occupational Profile:  Living Environment: She lives with her  in a H with a threshold step.   Previous level of function: Independent with self care, no longer driving .  She has a cleaning lady  every 2 weeks.  She has friends that bring her and her  food.   Roles and Routines: ill, sedentary  Equipment Used at Home: bedside commode, rollator, walker, rolling, oxygen, bath bench  Assistance upon Discharge: facility staff    Pain/Comfort:  Pain Rating 1:  0/10    Patients cultural, spiritual, Roman Catholic conflicts given the current situation: no    Objective:     Communicated with: nurse prior to session.  Patient found supine with PureWick (Power port) upon OT entry to room.    General Precautions: Standard, fall  Orthopedic Precautions: N/A  Braces: N/A  Respiratory Status: Nasal cannula, flow 5 L/min    Occupational Performance:    Bed Mobility:    Patient completed Rolling/Turning to Right with minimum assistance  Patient completed Scooting/Bridging with contact guard assistance  Patient completed Supine to Sit with minimum assistance  Patient completed Sit to Supine with moderate assistance      Activities of Daily Living:  Grooming: set up assistance for oral care and face washing seated EOB. She was leaning to the left and required Mod A to maintain an erect posture.  Therapeutic exercise  Anterior/ posterior tilt x 5,  shoulder rollsx 10 forward and backward, scapula retraction/ protraction x 5.       Cognitive/Visual Perceptual:  Cognitive/Psychosocial Skills:     -       Oriented to: Person, Place, Time, and Situation   -       Follows Commands/attention:Follows one-step commands  -       Communication: clear/fluent  -       Memory: No Deficits noted  -       Safety awareness/insight to disability: intact   -       Mood/Affect/Coping skills/emotional control: Appropriate to situation    Physical Exam:  Balance:    -       fair- sitting balance  Dominant hand:    -       Right  Upper Extremity Range of Motion:     -       Right Upper Extremity: WFL  -       Left Upper Extremity: WFL  Upper Extremity Strength:    -       Right Upper Extremity: WFL  -       Left Upper Extremity: WFL   Strength:    -       Right Upper Extremity: WFL  -       Left Upper Extremity: WFL  Fine Motor Coordination:    -       Intact  Gross motor coordination:   WFL    AMPAC 6 Click ADL:  AMPAC Total Score: 16    Treatment & Education:  She was educated on Role of Occupational  Therapy, goals and plan of care.  Discussed importance of OOB activity and functional mobility to negate the negative effects of prolonged bedrest during this hospitalization, safe transfers and d/c planning recommendations. She sat EOB and performed self care and trunk exercises.    Patient left HOB elevated with all lines intact, call button in reach, and bed alarm on    GOALS:   Multidisciplinary Problems       Occupational Therapy Goals          Problem: Occupational Therapy    Goal Priority Disciplines Outcome Interventions   Occupational Therapy Goal     OT, PT/OT     Description: Goals to be met by: 12/06/2024     Patient will increase functional independence with ADLs by performing:    UE Dressing with Supervision.  LE Dressing with Supervision.  Grooming while standing at sink with Supervision.  Toileting from bedside commode with Supervision for hygiene and clothing management.   Toilet transfer to bedside commode with Supervision.                         History:     Past Medical History:   Diagnosis Date    Arthritis     Cardiac arrest 10/2023    Cataract     Chronic obstructive pulmonary disease, unspecified COPD type 03/20/2024    Colon polyp     Coronary artery disease 03/20/2024    Diabetes mellitus type II 2012    Encounter for blood transfusion     Extra-ovarian endometrioid adenocarcinoma 2020    GERD (gastroesophageal reflux disease)     History of chronic cough     clear productive    Hyperlipidemia     Hypertension     Macular degeneration     Ovarian cancer     PONV (postoperative nausea and vomiting)     Squamous cell carcinoma 1980's    precancer of cervix         Past Surgical History:   Procedure Laterality Date    AUGMENTATION OF BREAST      BRONCHOSCOPY N/A 12/12/2023    Procedure: BRONCHOSCOPY;  Surgeon: Neva Christian MD;  Location: [facility];  Service: ENT;  Laterality: N/A;    BRONCHOSCOPY WITH FLUOROSCOPY Right 05/11/2023    Procedure: BRONCHOSCOPY, WITH FLUOROSCOPY;  Surgeon:  Neva Christian MD;  Location: [facility] ENDO;  Service: Pulmonary;  Laterality: Right;    BRONCHOSCOPY WITH FLUOROSCOPY N/A 12/15/2023    Procedure: BRONCHOSCOPY, WITH FLUOROSCOPY;  Surgeon: Neva Christian MD;  Location: [facility] ENDO;  Service: Pulmonary;  Laterality: N/A;    CATARACT EXTRACTION BILATERAL W/ ANTERIOR VITRECTOMY Bilateral     CHOLECYSTECTOMY      COLONOSCOPY      COLONOSCOPY N/A 04/20/2023    Procedure: COLONOSCOPY;  Surgeon: Sabino Stephenson MD;  Location: [facility] ENDO;  Service: Endoscopy;  Laterality: N/A;    CORONARY STENT PLACEMENT  10/2023    x 3    ESOPHAGOGASTRODUODENOSCOPY N/A 06/20/2018    Procedure: EGD (ESOPHAGOGASTRODUODENOSCOPY);  Surgeon: Sabino Stephenson MD;  Location: [facility] ENDO;  Service: Endoscopy;  Laterality: N/A;    ESOPHAGOGASTRODUODENOSCOPY N/A 03/31/2023    Procedure: EGD (ESOPHAGOGASTRODUODENOSCOPY);  Surgeon: Sabino Stephenson MD;  Location: [facility] ENDO;  Service: Endoscopy;  Laterality: N/A;    ESOPHAGOGASTRODUODENOSCOPY N/A 12/11/2023    Procedure: EGD (ESOPHAGOGASTRODUODENOSCOPY);  Surgeon: Pretty Salgado MD;  Location: [facility];  Service: Endoscopy;  Laterality: N/A;    HYSTERECTOMY  1976    partial    INJECTION OF ANESTHETIC AGENT AROUND MEDIAL BRANCH NERVES INNERVATING LUMBAR FACET JOINT Right 05/10/2023    Procedure: Block-nerve-Lateral-branch-lumbar;  Surgeon: Agustin Robles MD;  Location: [facility] OR;  Service: Pain Management;  Laterality: Right;  L5 and s1,s2 LBB    INJECTION OF ANESTHETIC AGENT AROUND MEDIAL BRANCH NERVES INNERVATING LUMBAR FACET JOINT Right 05/30/2023    Procedure: Block-nerve-medial branch-lumbar;  Surgeon: Agustin Robles MD;  Location: [facility] OR;  Service: Pain Management;  Laterality: Right;  L5, s1 ,s2 LBB #2    INJECTION OF ANESTHETIC AGENT AROUND NERVE Right 10/31/2023    Procedure: INTERCOSTAL NERVE BLOCK;  Surgeon: Washington Shankar MD;  Location: [facility] OR;  Service: Cardiothoracic;  Laterality: Right;    INJECTION, SACROILIAC JOINT Right 01/26/2023     Procedure: INJECTION,SACROILIAC JOINT;  Surgeon: Agustin Robles MD;  Location: [facility];  Service: Pain Management;  Laterality: Right;    INSERTION OF TUNNELED CENTRAL VENOUS CATHETER (CVC) WITH SUBCUTANEOUS PORT Right 10/19/2020    Procedure: INSERTION, PORT-A-CATH;  Surgeon: Misti Mcfarland MD;  Location: [facility];  Service: General;  Laterality: Right;    INTRAMEDULLARY RODDING OF TROCHANTER OF FEMUR Right 11/28/2021    Procedure: INSERTION, INTRAMEDULLARY LUC, FEMUR, TROCHANTER/RIGHT TFN DR FAJARDO NOTIFIED REP;  Surgeon: Kp Fajardo MD;  Location: [facility];  Service: Orthopedics;  Laterality: Right;  SKIP    ROBOT-ASSISTED LAPAROSCOPIC LYMPHADENECTOMY USING DA NELLA XI N/A 09/21/2020    Procedure: XI ROBOTIC LYMPHADENECTOMY-pelvic and kell-aortic;  Surgeon: Altagracia Ray MD;  Location: [facility];  Service: OB/GYN;  Laterality: N/A;    ROBOT-ASSISTED LAPAROSCOPIC OMENTECTOMY USING DA NELLA XI N/A 09/21/2020    Procedure: XI ROBOTIC OMENTECTOMY;  Surgeon: Altagracia Ray MD;  Location: [facility] OR;  Service: OB/GYN;  Laterality: N/A;    ROBOT-ASSISTED LAPAROSCOPIC SALPINGO-OOPHORECTOMY USING DA NELLA XI Bilateral 09/21/2020    Procedure: XI ROBOTIC SALPINGO-OOPHORECTOMY;  Surgeon: Altagracia Ray MD;  Location: [facility];  Service: OB/GYN;  Laterality: Bilateral;    THORACOSCOPIC BIOPSY OF PLEURA Right 10/31/2023    Procedure: VATS, WITH PLEURA BIOPSY;  Surgeon: Washington Shankar MD;  Location: [facility];  Service: Cardiothoracic;  Laterality: Right;  FROZEN SECTION   **KRISTEN-ASSISDT**    THORACOTOMY Right 10/31/2023    Procedure: THORACOTOMY;  Surgeon: Washington Shankar MD;  Location: [facility];  Service: Cardiothoracic;  Laterality: Right;    UPPER GASTROINTESTINAL ENDOSCOPY         Time Tracking:     OT Date of Treatment: 11/06/24  OT Start Time: 1102  OT Stop Time: 1130  OT Total Time (min): 28 min    Billable Minutes:Evaluation 15  Self Care/Home Management 13    11/6/2024

## 2024-11-06 NOTE — ASSESSMENT & PLAN NOTE
Patient has paroxysmal (<7 days) atrial fibrillation. Patient is currently in sinus rhythm. [id number] Score: 5. The patients heart rate in the last 24 hours is as follows:  Pulse  Min: 64  Max: 73     Antiarrhythmics  Amiodarone and metoprolol    Anticoagulants  Eliquis    Plan  - Replete lytes with a goal of K>4, Mg >2  - Patient is anticoagulated, see medications listed above.  - Patient's afib is currently controlled

## 2024-11-06 NOTE — ASSESSMENT & PLAN NOTE
Carbo/Pemetrexed until 4/2024 now on Pemetrexed maintenance with last dose 10/28/24  Consult pt's oncologist, Dr. Cassidy following

## 2024-11-06 NOTE — ASSESSMENT & PLAN NOTE
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 Most recent hemoglobin and hematocrit are listed below.  Recent Labs     11/04/24  0627 11/05/24  0654 11/06/24  0321   HGB 7.6* 8.3* 9.7*   HCT 24.9* 25.5* 31.8*       Plan  - Monitor serial CBC: Daily  - Transfuse PRBC if patient becomes hemodynamically unstable, symptomatic or H/H drops below 7/21.  - Patient has received 1 units of PRBCs on 11/4  - Patient's anemia is currently worsening. Will continue current treatment  - T&S in AM in the event she requires a transfusion

## 2024-11-06 NOTE — CARE UPDATE
11/05/24 2001   Patient Assessment/Suction   Level of Consciousness (AVPU) alert   Respiratory Effort Normal;Unlabored   Expansion/Accessory Muscles/Retractions no use of accessory muscles   All Lung Fields Breath Sounds diminished;clear;equal bilaterally   Rhythm/Pattern, Respiratory pattern regular;unlabored   Cough Frequency no cough   PRE-TX-O2   Device (Oxygen Therapy) high flow nasal cannula   $ Is the patient on Low Flow Oxygen? Yes   Flow (L/min) (Oxygen Therapy) 120   SpO2 98 %   Pulse Oximetry Type Intermittent   $ Pulse Oximetry - Multiple Charge Pulse Oximetry - Multiple   Pulse 72   Resp 20   Positioning Supine   Positioning   Head of Bed (HOB) Positioning HOB at 15 degrees   Positioning/Transfer Devices pillows;in use   Aerosol Therapy   $ Aerosol Therapy Charges Aerosol Treatment   Daily Review of Necessity (SVN) completed   Respiratory Treatment Status (SVN) given   Treatment Route (SVN) mask;oxygen   Patient Position HOB elevated   Post Treatment Assessment (SVN) patient reports breathing improved   Signs of Intolerance (SVN) none   Preset CPAP/BiPAP Settings   Mode Of Delivery BiPAP;Standby   CPAP/BIPAP charged w/in last 24 h NO   $ Initial CPAP/BiPAP Setup? No   $ Is patient using? No/refused   Reason patient is not wearing? Patient refused   Who was contacted if refused? Rufina Chavira, LPN   Education   $ Education Bronchodilator;Oxygen;BiPAP;15 min

## 2024-11-06 NOTE — PROGRESS NOTES
Pharmacist Renal Dose Adjustment Note    Alexa Otero is a 84 y.o. female being treated with the medication cefepime      Recent Labs   Lab 11/04/24  0627 11/05/24  0654 11/06/24  0321   CREATININE 1.4 1.5* 1.5*     Serum creatinine: 1.5 mg/dL (H) 11/06/24 0321  Estimated creatinine clearance: 22.2 mL/min (A)    Medication:cefepime dose: 1g frequency q12h will be changed to medication:cefepime dose:1 g frequency:q24h  Due to CrCl < 30 mL/min    Pharmacist's Name: Chadwick Breaux  Pharmacist's Extension: [phone]

## 2024-11-06 NOTE — ASSESSMENT & PLAN NOTE
Constipation likely contributing  She had a couple of Bms and started bowel regimen  Priest placed for significant retention   D/C priest 11/5 and had to be reinserted on 11/6

## 2024-11-06 NOTE — SUBJECTIVE & OBJECTIVE
Interval History:   Patient seen and examined.  NAD.  Oxygen weaned.  Review of Systems   Constitutional:  Positive for fatigue. Negative for chills and fever.   Respiratory:  Positive for cough and shortness of breath.    Cardiovascular: Negative.    Gastrointestinal: Negative.    Genitourinary: Negative.    Neurological:  Positive for weakness.     Objective:     Vital Signs (Most Recent):  Temp: 97.4 °F (36.3 °C) (11/06/24 1130)  Pulse: 64 (11/06/24 1331)  Resp: 15 (11/06/24 1331)  BP: 119/62 (11/06/24 1130)  SpO2: 98 % (11/06/24 1331) Vital Signs (24h Range):  Temp:  [97.3 °F (36.3 °C)-97.9 °F (36.6 °C)] 97.4 °F (36.3 °C)  Pulse:  [64-73] 64  Resp:  [15-20] 15  SpO2:  [91 %-99 %] 98 %  BP: (112-148)/(62-72) 119/62     Weight: 49.5 kg (109 lb 2 oz)  Body mass index is 18.73 kg/m².    Intake/Output Summary (Last 24 hours) at 11/6/2024 1448  Last data filed at 11/6/2024 1312  Gross per 24 hour   Intake 1660 ml   Output 750 ml   Net 910 ml         Physical Exam  Constitutional:       Comments: Chronically ill appearing   HENT:      Head: Normocephalic and atraumatic.      Nose: Nose normal.      Mouth/Throat:      Mouth: Mucous membranes are moist.      Pharynx: Oropharynx is clear.   Eyes:      Pupils: Pupils are equal, round, and reactive to light.   Cardiovascular:      Rate and Rhythm: Normal rate.   Pulmonary:      Effort: Pulmonary effort is normal.      Comments: Coarse and diminished throughout  Abdominal:      Comments: Abd is firm, mildly distended and nontender Bsx4 quadrants   Musculoskeletal:         General: Normal range of motion.      Cervical back: Normal range of motion and neck supple.   Skin:     General: Skin is warm.      Capillary Refill: Capillary refill takes 2 to 3 seconds.      Findings: Bruising present.   Neurological:      General: No focal deficit present.      Mental Status: She is alert and oriented to person, place, and time.   Psychiatric:         Mood and Affect: Mood normal.          Behavior: Behavior normal.             Significant Labs: All pertinent labs within the past 24 hours have been reviewed.  A1C:   Recent Labs   Lab 06/26/24  0958   HGBA1C 5.5     ABGs:   Recent Labs   Lab 11/06/24  1249   PH 7.442   PCO2 50.9*   HCO3 34.7*   POCSATURATED 94   BE 11*   PO2 69*     CBC:   Recent Labs   Lab 11/05/24  0654 11/06/24  0321   WBC 3.76* 2.92*   HGB 8.3* 9.7*   HCT 25.5* 31.8*   * 103*     CMP:   Recent Labs   Lab 11/05/24  0654 11/06/24  0321    133*   K 4.4 5.2*   CL 95 96   CO2 33* 31*   * 262*   BUN 56* 58*   CREATININE 1.5* 1.5*   CALCIUM 8.8 8.7   PROT 5.6* 5.7*   ALBUMIN 2.5* 2.7*   BILITOT 0.4 0.4   ALKPHOS 48* 50*   AST 18 15   ALT 19 21   ANIONGAP 8 6*     Magnesium:   Recent Labs   Lab 11/05/24  0654 11/06/24  0321   MG 2.2 2.2     POCT Glucose:   Recent Labs   Lab 11/05/24  2031 11/06/24  0841 11/06/24  1202   POCTGLUCOSE 309* 270* 306*       Significant Imaging: I have reviewed all pertinent imaging results/findings within the past 24 hours.

## 2024-11-06 NOTE — PT/OT/SLP PROGRESS
Physical Therapy Treatment    Patient Name:  Alexa Otero   MRN:  [id number]    Recommendations:     Discharge Recommendations: Moderate Intensity Therapy (vs Low w/ 24/7 assist (pending progress/oxygen requirements))  Discharge Equipment Recommendations: walker, rolling  Barriers to discharge:  increased assist with mobility, decreased activity tolerance, balance deficits    Assessment:     Alexa Otero is a 84 y.o. female admitted with a medical diagnosis of Acute on chronic hypoxic respiratory failure.  She presents with the following impairments/functional limitations: weakness, impaired endurance, impaired self care skills, impaired functional mobility, gait instability, impaired balance, impaired cardiopulmonary response to activity.    Pt agreeable to sit EOB, reports feeling fatigued today. Spouse present.    Pt required min assist for supine to sit transfer. Pt required contact guard to min assist for sitting balance EOB due to right lateral lean.    Pt request to transfer to BSC to attempt to have BM. Pt required mod to max assist x 1-2 persons for stand pivot transfer from bed to BSC. Pt able to have small BM.    Pt required max assist x 1-2 for sit to stand transfer with no AD, holding onto tech, for hygiene.    Pt transfer back to bed via SPT with max assist x 1-2.    Pt returned to supine with min assist, mostly for BLE.    Rehab Prognosis: Fair; patient would benefit from acute skilled PT services to address these deficits and reach maximum level of function.    Recent Surgery: * No surgery found *      Plan:     During this hospitalization, patient to be seen 5 x/week to address the identified rehab impairments via gait training, therapeutic activities, therapeutic exercises and progress toward the following goals:    Plan of Care Expires:  12/02/24    Subjective     Chief Complaint: fatigue  Patient/Family Comments/goals: to get better  Pain/Comfort:  Pain Rating 1: 0/10      Objective:      Communicated with nurse prior to session.  Patient found HOB elevated with bed alarm, priest catheter, peripheral IV, telemetry upon PT entry to room.     General Precautions: Standard, fall  Orthopedic Precautions: N/A  Braces: N/A  Respiratory Status: Nasal cannula, flow 6 L/min     Functional Mobility:  Bed Mobility:     Scooting: maximal assistance and of 2 persons  Supine to Sit: minimum assistance  Sit to Supine: minimum assistance  Transfers:     Toilet Transfer: moderate assistance, maximal assistance, and of 1-2 persons with  no AD  using  Stand Pivot      AM-PAC 6 CLICK MOBILITY          Treatment & Education:  Pt educated on importance of time OOB, importance of intermittent mobility, safe techniques for transfers/ambulation, discharge recommendations/options, and use of call light for assistance and fall prevention.      Patient left HOB elevated with all lines intact, call button in reach, bed alarm on, and nurse notified..    GOALS:   Multidisciplinary Problems       Physical Therapy Goals          Problem: Physical Therapy    Goal Priority Disciplines Outcome Interventions   Physical Therapy Goal     PT, PT/OT     Description: Goals to be met by: 24     Patient will increase functional independence with mobility by performin. Supine to sit with Supervision  2. Sit to stand transfer with Supervision  3. Bed to chair transfer with Supervision using Rolling Walker  4. Gait  x 100 feet with Supervision using Rolling Walker.                              Time Tracking:     PT Received On: 24  PT Start Time: 1404     PT Stop Time: 1422  PT Total Time (min): 18 min     Billable Minutes: Therapeutic Activity 18    Treatment Type: Treatment  PT/PTA: PTA     Number of PTA visits since last PT visit: 2     2024

## 2024-11-06 NOTE — ASSESSMENT & PLAN NOTE
Likely secondary to her bilateral extensive pneumonia.  On 4L NC at home  Currently on high flow nasal cannula and intermittent bipap  Pulm consulted   CTA chest ruled out PE.  Echo done last month showed normal systolic function.   Pneumonia vs. Pneumonitis? - pulm added prednisone initially and escalated to IV solumedrol for possible Pemetrexed induced pneumonitis   IV lasix on 11/3

## 2024-11-06 NOTE — ASSESSMENT & PLAN NOTE
Patient was started on cefepime, doxy for atypicals, and Vanc, vanc d/c'ed with negative MRSA screen  Pulmonary and Infectious Disease following    Antibiotics (From admission, onward)      Start     Stop Route Frequency Ordered    11/07/24 0100  ceFEPIme injection 1 g         -- IV Every 24 hours (non-standard times) 11/06/24 1252    11/01/24 1330  doxycycline capsule 100 mg         -- Oral Every 12 hours 11/01/24 1322    11/01/24 1300  mupirocin 2 % ointment         11/06/24 0859 Nasl 2 times daily 11/01/24 1200            Microbiology Results (last 7 days)       Procedure Component Value Units Date/Time    Blood culture x two cultures. Draw prior to antibiotics. [[id number]] Collected: 11/01/24 1045    Order Status: Completed Specimen: Blood from Peripheral, Antecubital, Left Updated: 11/06/24 1032     Blood Culture, Routine No growth after 5 days.    Narrative:      Aerobic and anaerobic    Blood culture x two cultures. Draw prior to antibiotics. [[id number]] Collected: 11/01/24 1051    Order Status: Completed Specimen: Blood from Peripheral, Antecubital, Right Updated: 11/06/24 1032     Blood Culture, Routine No growth after 5 days.    Narrative:      Aerobic and anaerobic    MRSA Screen by PCR [[id number]] Collected: 11/02/24 1329    Order Status: Completed Specimen: Nasal Swab Updated: 11/02/24 1532     MRSA SCREEN BY PCR Negative    Respiratory Infection Panel (PCR), Nasopharyngeal [[id number]] Collected: 11/02/24 1329    Order Status: Completed Specimen: Nasopharyngeal Swab Updated: 11/02/24 1509     Respiratory Infection Panel Source NP swab     Adenovirus Not Detected     Coronavirus 229E, Common Cold Virus Not Detected     Coronavirus HKU1, Common Cold Virus Not Detected     Coronavirus NL63, Common Cold Virus Not Detected     Coronavirus OC43, Common Cold Virus Not Detected     Comment: Coronavirus strains 229E, HKU1, NL63, and OC43 can cause the common   cold   and are not associated with the  respiratory disease outbreak caused   by  the COVID-19 (SARS-CoV-2 novel Coronavirus) strain.           SARS-CoV2 (COVID-19) Qualitative PCR Not Detected     Human Metapneumovirus Not Detected     Human Rhinovirus/Enterovirus Not Detected     Influenza A (subtypes H1, H1-2009,H3) Not Detected     Influenza B Not Detected     Parainfluenza Virus 1 Not Detected     Parainfluenza Virus 2 Not Detected     Parainfluenza Virus 3 Not Detected     Parainfluenza Virus 4 Not Detected     Respiratory Syncytial Virus Not Detected     Bordetella Parapertussis ([id number]) Not Detected     Bordetella pertussis (ptxP) Not Detected     Chlamydia pneumoniae Not Detected     Mycoplasma pneumoniae Not Detected     Comment: Respiratory Infection Panel testing performed by Multiplex PCR.       Narrative:      Respiratory Infection Panel source->NP Swab    Culture, Respiratory with Gram Stain [[id number]]     Order Status: Sent Specimen: Respiratory from Sputum, Expectorated

## 2024-11-06 NOTE — PROGRESS NOTES
[facility]  Progress Note    Patient Name: Alexa Otero  MRN: [id number]  Patient Class: IP- Inpatient   Admission Date: 11/1/2024  Length of Stay: 5 days  Attending Physician: Callie Cherry MD  Primary Care Provider: Idalia Greco MD        Subjective:     Principal Problem:Acute on chronic hypoxic respiratory failure        HPI:  84-year-old female with a past medical history of lung cancer actively getting chemo last of which was 5 days ago, who presented to the ER because of generalized weakness.  According to the patient, she woke up today, tried to get out of bed, but felt very weak, and slid beside the bed.  Did not hit her head, and did not lose consciousness.  She however could not get up however, and her  could not assist.  911 was called.  On arrival of EMS, patient was satting 88% on 4 L, and appeared to be short of breath.  She was brought to the ER for evaluation.    In the ER, vitals showed a blood pressure of 145/65, heart rate of 103, respiratory rate of 20, afebrile satting 60% on 4 L.  Immediately patient was placed on non-rebreather.  CBC with white count of 16.8, and macrocytic anemia.  CMP showed hypokalemia of 3.4.  First troponin is elevated at 46.  Point of care Lactic acid was 2.1.  Blood cultures were collected.  EKG showed sinus rhythm.  Chest x-ray showed no significant change of bilateral diffuse mixed interstitial and airspace lung opacities.  CTA chest was done, and it was negative for PE, but showed extensive interstitial and airspace opacities throughout both lungs, having significantly worsened in the left lung compared to prior CT. Unchanged small to moderate sized bilateral pleural effusions.  Cefepime was ordered, and patient will be admitted to Medicine for further care of her severe sepsis.    Overview/Hospital Course:  Ms. Otero has been monitored closely during her hospitalization. She was admitted on 11/1/2024 with acute on  chronic hypoxemic resp failure. CTA chest reveals extensive interstitial opacities c/w multifocal pna and/or ARDS. She requires high flow nasal cannula up to 15L now on 13L. Pulm and ID have been consulted. Pulm recommends broad spectrum abx - cefepime, doxy, and vanc initially. MRSA screen negative and vanc d/c'ed. She has stage IV lung ca and completed Carbo/Pemetrexed last year and is currently on pemetrexed (Alimta) maintenance with last dose 10/28. This can cause an interstitial pneumonitis and pulm has started prednisone. Plan for bronchoscopy on Monday if no improvement and recommended continuing eliquis. BiPAP qHS and naps. Duonebs Q6h. ID has ordered a respiratory infection panel that is negative. She's had abdominal bloating and discomfort for some time and had an outpt CT abd/pelvis with gastric wall thickening that is is nonspecific could be related to gastritis. She has a history of peptic ulcers and PCP started protonix/carafate. KUB on 11/2 with nonobstructive bowel gas pattern and moderate stool burden and pt was treated with laxative suppository. She has chronic macrocytic anemia that appears at baseline. She had a leukocytosis with left shift on admission that has trended down from 16K-->12K-->8K. CRP 36, procal 2.9. Pt had a BM on 11/2, but abd remained distended. She was found to be retaining a significant amount of urine on bladder scan >900 and priest was placed with 1500mL out with resolution of abd distention. Pulm increased steriods on 11/3 to IV solumedrol, she's requiring BiPAP support and was given IV lasix. Macrocytic anemia at baseline on admission but trending down and she will receive 1 unit PRBCs 11/4. She has developed steroid induced hyperglycemia and insulin sliding titrated up to moderate dose for tighter glucose control. Priest d/c'ed on 11/5. PT/OT consulted and pt up to chair on 11/5. She desatted with movement and took some time and increased supplemental O2 to recover. Prior  to moving to the chair, O2 was weaned down to 10L high flow.    Interval History:   Patient seen and examined.  NAD.  Oxygen weaned.  Review of Systems   Constitutional:  Positive for fatigue. Negative for chills and fever.   Respiratory:  Positive for cough and shortness of breath.    Cardiovascular: Negative.    Gastrointestinal: Negative.    Genitourinary: Negative.    Neurological:  Positive for weakness.     Objective:     Vital Signs (Most Recent):  Temp: 97.4 °F (36.3 °C) (11/06/24 1130)  Pulse: 64 (11/06/24 1331)  Resp: 15 (11/06/24 1331)  BP: 119/62 (11/06/24 1130)  SpO2: 98 % (11/06/24 1331) Vital Signs (24h Range):  Temp:  [97.3 °F (36.3 °C)-97.9 °F (36.6 °C)] 97.4 °F (36.3 °C)  Pulse:  [64-73] 64  Resp:  [15-20] 15  SpO2:  [91 %-99 %] 98 %  BP: (112-148)/(62-72) 119/62     Weight: 49.5 kg (109 lb 2 oz)  Body mass index is 18.73 kg/m².    Intake/Output Summary (Last 24 hours) at 11/6/2024 1448  Last data filed at 11/6/2024 1312  Gross per 24 hour   Intake 1660 ml   Output 750 ml   Net 910 ml         Physical Exam  Constitutional:       Comments: Chronically ill appearing   HENT:      Head: Normocephalic and atraumatic.      Nose: Nose normal.      Mouth/Throat:      Mouth: Mucous membranes are moist.      Pharynx: Oropharynx is clear.   Eyes:      Pupils: Pupils are equal, round, and reactive to light.   Cardiovascular:      Rate and Rhythm: Normal rate.   Pulmonary:      Effort: Pulmonary effort is normal.      Comments: Coarse and diminished throughout  Abdominal:      Comments: Abd is firm, mildly distended and nontender Bsx4 quadrants   Musculoskeletal:         General: Normal range of motion.      Cervical back: Normal range of motion and neck supple.   Skin:     General: Skin is warm.      Capillary Refill: Capillary refill takes 2 to 3 seconds.      Findings: Bruising present.   Neurological:      General: No focal deficit present.      Mental Status: She is alert and oriented to person, place, and  time.   Psychiatric:         Mood and Affect: Mood normal.         Behavior: Behavior normal.             Significant Labs: All pertinent labs within the past 24 hours have been reviewed.  A1C:   Recent Labs   Lab 06/26/24  0958   HGBA1C 5.5     ABGs:   Recent Labs   Lab 11/06/24  1249   PH 7.442   PCO2 50.9*   HCO3 34.7*   POCSATURATED 94   BE 11*   PO2 69*     CBC:   Recent Labs   Lab 11/05/24  0654 11/06/24  0321   WBC 3.76* 2.92*   HGB 8.3* 9.7*   HCT 25.5* 31.8*   * 103*     CMP:   Recent Labs   Lab 11/05/24  0654 11/06/24  0321    133*   K 4.4 5.2*   CL 95 96   CO2 33* 31*   * 262*   BUN 56* 58*   CREATININE 1.5* 1.5*   CALCIUM 8.8 8.7   PROT 5.6* 5.7*   ALBUMIN 2.5* 2.7*   BILITOT 0.4 0.4   ALKPHOS 48* 50*   AST 18 15   ALT 19 21   ANIONGAP 8 6*     Magnesium:   Recent Labs   Lab 11/05/24  0654 11/06/24  0321   MG 2.2 2.2     POCT Glucose:   Recent Labs   Lab 11/05/24  2031 11/06/24  0841 11/06/24  1202   POCTGLUCOSE 309* 270* 306*       Significant Imaging: I have reviewed all pertinent imaging results/findings within the past 24 hours.    Assessment/Plan:      * Acute on chronic hypoxic respiratory failure  Likely secondary to her bilateral extensive pneumonia.  On 4L NC at home  Currently on high flow nasal cannula and intermittent bipap  Pulm consulted   CTA chest ruled out PE.  Echo done last month showed normal systolic function.   Pneumonia vs. Pneumonitis? 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 Most recent hemoglobin and hematocrit are listed below.  Recent Labs     11/04/24  0627 11/05/24  0654 11/06/24  0321   HGB 7.6*  8.3* 9.7*   HCT 24.9* 25.5* 31.8*       Plan  - Monitor serial CBC: Daily  - Transfuse PRBC if patient becomes hemodynamically unstable, symptomatic or H/H drops below 7/21.  - Patient has received 1 units of PRBCs on 11/4  - Patient's anemia is currently worsening. Will continue current treatment  - T&S in AM in the event she requires a transfusion    Multifocal pneumonia vs. pneumonitis  Patient was started on cefepime, doxy for atypicals, and Vanc, vanc d/c'ed with negative MRSA screen  Pulmonary and Infectious Disease following    Antibiotics (From admission, onward)      Start     Stop Route Frequency Ordered    11/07/24 0100  ceFEPIme injection 1 g         -- IV Every 24 hours (non-standard times) 11/06/24 1252    11/01/24 1330  doxycycline capsule 100 mg         -- Oral Every 12 hours 11/01/24 1322    11/01/24 1300  mupirocin 2 % ointment         11/06/24 0859 Nasl 2 times daily 11/01/24 1200            Microbiology Results (last 7 days)       Procedure Component Value Units Date/Time    Blood culture x two cultures. Draw prior to antibiotics. [[id number]] Collected: 11/01/24 1045    Order Status: Completed Specimen: Blood from Peripheral, Antecubital, Left Updated: 11/06/24 1032     Blood Culture, Routine No growth after 5 days.    Narrative:      Aerobic and anaerobic    Blood culture x two cultures. Draw prior to antibiotics. [[id number]] Collected: 11/01/24 1051    Order Status: Completed Specimen: Blood from Peripheral, Antecubital, Right Updated: 11/06/24 1032     Blood Culture, Routine No growth after 5 days.    Narrative:      Aerobic and anaerobic    MRSA Screen by PCR [[id number]] Collected: 11/02/24 1329    Order Status: Completed Specimen: Nasal Swab Updated: 11/02/24 1532     MRSA SCREEN BY PCR Negative    Respiratory Infection Panel (PCR), Nasopharyngeal [[id number]] Collected: 11/02/24 1329    Order Status: Completed Specimen: Nasopharyngeal Swab Updated: 11/02/24 1509     Respiratory Infection  Panel Source NP swab     Adenovirus Not Detected     Coronavirus 229E, Common Cold Virus Not Detected     Coronavirus HKU1, Common Cold Virus Not Detected     Coronavirus NL63, Common Cold Virus Not Detected     Coronavirus OC43, Common Cold Virus Not Detected     Comment: Coronavirus strains 229E, HKU1, NL63, and OC43 can cause the common   cold   and are not associated with the respiratory disease outbreak caused   by  the COVID-19 (SARS-CoV-2 novel Coronavirus) strain.           SARS-CoV2 (COVID-19) Qualitative PCR Not Detected     Human Metapneumovirus Not Detected     Human Rhinovirus/Enterovirus Not Detected     Influenza A (subtypes H1, H1-2009,H3) Not Detected     Influenza B Not Detected     Parainfluenza Virus 1 Not Detected     Parainfluenza Virus 2 Not Detected     Parainfluenza Virus 3 Not Detected     Parainfluenza Virus 4 Not Detected     Respiratory Syncytial Virus Not Detected     Bordetella Parapertussis (HR8986) Not Detected     Bordetella pertussis (ptxP) Not Detected     Chlamydia pneumoniae Not Detected     Mycoplasma pneumoniae Not Detected     Comment: Respiratory Infection Panel testing performed by Multiplex PCR.       Narrative:      Respiratory Infection Panel source->NP Swab    Culture, Respiratory with Gram Stain [[id number]]     Order Status: Sent Specimen: Respiratory from Sputum, Expectorated             Pleural effusion  Bilateral, stable.   Pulm consulted  In the setting of lung ca      Malignant neoplasm of lower lobe of right lung  Carbo/Pemetrexed until 4/2024 now on Pemetrexed maintenance with last dose 10/28/24  Consult pt's oncologist, Dr. Cassidy following      Atrial fibrillation  Patient has paroxysmal (<7 days) atrial fibrillation. Patient is currently in sinus rhythm. [id number] Score: 5. The patients heart rate in the last 24 hours is as follows:  Pulse  Min: 64  Max: 73     Antiarrhythmics  Amiodarone and metoprolol    Anticoagulants  Eliquis    Plan  - Replete lymarcellus  with a goal of K>4, Mg >2  - Patient is anticoagulated, see medications listed above.  - Patient's afib is currently controlled      VTE Risk Mitigation (From admission, onward)           Ordered     apixaban tablet 2.5 mg  2 times daily         11/01/24 1316     IP VTE HIGH RISK PATIENT  Once         11/01/24 1158     Place sequential compression device  Until discontinued         11/01/24 1158                    Discharge Planning   ALYCIA:      Code Status: DNR   Is the patient medically ready for discharge?:     Reason for patient still in hospital (select all that apply): Patient trending condition, Treatment, Consult recommendations, and Pending disposition  Discharge Plan A: Home Health                  Susan Medellin NP  Department of Hospital Medicine   [facility]

## 2024-11-06 NOTE — CARE UPDATE
11/06/24 0722   Patient Assessment/Suction   Level of Consciousness (AVPU) alert   Respiratory Effort Unlabored   Expansion/Accessory Muscles/Retractions no use of accessory muscles   All Lung Fields Breath Sounds diminished   Rhythm/Pattern, Respiratory depth regular;pattern regular   Cough Frequency no cough   Skin Integrity   $ Wound Care Tech Time 15 min   Area Observed Bridge of nose   Skin Appearance without discoloration   PRE-TX-O2   Device (Oxygen Therapy) high flow nasal cannula w/device   $ Is the patient on Low Flow Oxygen? Yes   Flow (L/min) (Oxygen Therapy) 10   SpO2 98 %   Pulse Oximetry Type Intermittent   $ Pulse Oximetry - Multiple Charge Pulse Oximetry - Multiple   Pulse 68   Resp 15   Aerosol Therapy   $ Aerosol Therapy Charges Aerosol Treatment   Daily Review of Necessity (SVN) completed   Respiratory Treatment Status (SVN) given   Treatment Route (SVN) mask   Patient Position semi-Morrell's   Post Treatment Assessment (SVN) increased aeration   Signs of Intolerance (SVN) none   Breath Sounds Post-Respiratory Treatment   Throughout All Fields Post-Treatment aeration increased   Post-treatment Heart Rate (beats/min) 69   Post-treatment Resp Rate (breaths/min) 15   Preset CPAP/BiPAP Settings   $ CPAP/BiPAP Daily Charge 1   CPAP/BIPAP charged w/in last 24 h YES   Education   $ Education Bronchodilator;15 min   Respiratory Evaluation   $ Care Plan Tech Time 15 min

## 2024-11-06 NOTE — PROGRESS NOTES
Pulmonary/Critical Care Progress Note      PATIENT NAME: Alexa Otero  MRN: [id number]  TODAY'S DATE: 2024  11:55 AM  ADMIT DATE: 2024  AGE: 84 y.o. 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    She reports progressive fatigue weakness prior to admission to the hospital.  She reports that she slid out of her bed and that is the reason she came to the hospital.  She reports that she has not had any fever cough night sweats, but she has some worsening shortness of breath.    11/3   - rough night, had worsening shortness of breath and is required non-rebreather in addition no nasal cannula currently she is on high-flow at 10 liters/minute     the patient is feeling a little better today.  She is on 15 L high-flow nasal cannula.  Her CT shows her chronically infiltrated right lung with the surrounding effusion.  Her left lung which was normal looking 2 weeks ago now is diffusely infiltrated with ground-glass opacities.  This must be the Alimta.     the patient is improving with the steroids.  She looks much brighter today.  She is feeling better.  She is on 10 L high-flow nasal cannula.     the patient is very sleepy today.  The nurse reports she was up earlier and ate breakfast and sat up.  Her oxygen is set on 10 L.  Her sats remain 99% on 5 L. I have asked the nurse to try to wean this.    Review of Systems   Constitutional:  Positive for appetite change. Negative for chills, fever and unexpected weight change.   HENT:  Negative for nosebleeds, postnasal drip, trouble swallowing and voice change.    Eyes:   Negative for visual disturbance.   Respiratory:  Positive for shortness of breath. Negative for cough and wheezing.    Cardiovascular:  Negative for chest pain and leg swelling.   Gastrointestinal:  Negative for diarrhea, nausea and vomiting.   Endocrine: Negative for cold intolerance and heat intolerance.   Genitourinary:  Negative for dysuria, flank pain and frequency (.jppe).   Musculoskeletal:  Negative for neck pain and neck stiffness.   Skin:  Positive for wound.   Allergic/Immunologic: Negative for environmental allergies and immunocompromised state.   Neurological:  Negative for syncope, speech difficulty and weakness.   Hematological:  Negative for adenopathy.   Psychiatric/Behavioral:  Negative for confusion.          No change in the patient's Past Medical History, Past Surgical History, Social History or Family History since admission.    VITAL SIGNS (MOST RECENT)  Temp: 97.5 °F (36.4 °C) (11/06/24 1559)  Pulse: 77 (11/06/24 1559)  Resp: 15 (11/06/24 1331)  BP: 124/70 (11/06/24 1559)  SpO2: 97 % (11/06/24 1559)    INTAKE AND OUTPUT (LAST 24 HOURS):  Intake/Output Summary (Last 24 hours) at 11/6/2024 1615  Last data filed at 11/6/2024 1312  Gross per 24 hour   Intake 1660 ml   Output 750 ml   Net 910 ml       WEIGHT  Wt Readings from Last 1 Encounters:   11/01/24 49.5 kg (109 lb 2 oz)         PHYSICAL EXAM  GENERAL: Older patient in no distress, looking frail.  HEENT: Pupils equal and reactive. Extraocular movements intact. Nose intact.  Pharynx moist.  NECK: Supple.   HEART: Regular rate and rhythm. No murmur or gallop auscultated.  LUNGS:  There are diminished breath sounds on the right.  On the left there are diffuse crackles.  Lung excursion symmetrical. No change in fremitus.  There is a port in thorax which is accessed.  ABDOMEN: Bowel sounds present. Non-tender, no masses palpated.  : Normal anatomy.  EXTREMITIES: Normal muscle tone and joint movement, no cyanosis or clubbing.   LYMPHATICS: No  adenopathy palpated, no edema.  SKIN: Dry, intact, no lesions.  There is a stage I decubitus on the buttock.    NEURO: Cranial nerves II-XII intact. Motor strength 5/5 bilaterally, upper and lower extremities.  PSYCH: Appropriate affect.        CBC LAST (LAST 24 HOURS)  Recent Labs   Lab 11/06/24  0321   WBC 2.92*   RBC 3.00*   HGB 9.7*   HCT 31.8*   *   MCH 32.3*   MCHC 30.5*   RDW 18.2*   *   MPV 9.8   GRAN 95.0*   LYMPH 5.0*   MONO 0.0*   NRBC 0       CHEMISTRY LAST (LAST 24 HOURS)  Recent Labs   Lab 11/06/24  0321 11/06/24  1249   *  --    K 5.2*  --    CL 96  --    CO2 31*  --    ANIONGAP 6*  --    BUN 58*  --    CREATININE 1.5*  --    *  --    CALCIUM 8.7  --    PH  --  7.442   MG 2.2  --    ALBUMIN 2.7*  --    PROT 5.7*  --    ALKPHOS 50*  --    ALT 21  --    AST 15  --    BILITOT 0.4  --            CARDIAC PROFILE (LAST 24 HOURS)  Recent Labs   Lab 11/01/24  0950 11/02/24  1335   *  --    LDH  --  413*   TROPONINIHS 46.9*  --        LAST 7 DAYS MICROBIOLOGY   Microbiology Results (last 7 days)       Procedure Component Value Units Date/Time    Blood culture x two cultures. Draw prior to antibiotics. [[id number]] Collected: 11/01/24 1045    Order Status: Completed Specimen: Blood from Peripheral, Antecubital, Left Updated: 11/06/24 1032     Blood Culture, Routine No growth after 5 days.    Narrative:      Aerobic and anaerobic    Blood culture x two cultures. Draw prior to antibiotics. [[id number]] Collected: 11/01/24 1051    Order Status: Completed Specimen: Blood from Peripheral, Antecubital, Right Updated: 11/06/24 1032     Blood Culture, Routine No growth after 5 days.    Narrative:      Aerobic and anaerobic    MRSA Screen by PCR [[id number]] Collected: 11/02/24 1329    Order Status: Completed Specimen: Nasal Swab Updated: 11/02/24 1532     MRSA SCREEN BY PCR Negative    Respiratory Infection Panel (PCR), Nasopharyngeal [[id number]] Collected: 11/02/24 1329    Order  Status: Completed Specimen: Nasopharyngeal Swab Updated: 11/02/24 1509     Respiratory Infection Panel Source NP swab     Adenovirus Not Detected     Coronavirus 229E, Common Cold Virus Not Detected     Coronavirus HKU1, Common Cold Virus Not Detected     Coronavirus NL63, Common Cold Virus Not Detected     Coronavirus OC43, Common Cold Virus Not Detected     Comment: Coronavirus strains 229E, HKU1, NL63, and OC43 can cause the common   cold   and are not associated with the respiratory disease outbreak caused   by  the COVID-19 (SARS-CoV-2 novel Coronavirus) strain.           SARS-CoV2 (COVID-19) Qualitative PCR Not Detected     Human Metapneumovirus Not Detected     Human Rhinovirus/Enterovirus Not Detected     Influenza A (subtypes H1, H1-2009,H3) Not Detected     Influenza B Not Detected     Parainfluenza Virus 1 Not Detected     Parainfluenza Virus 2 Not Detected     Parainfluenza Virus 3 Not Detected     Parainfluenza Virus 4 Not Detected     Respiratory Syncytial Virus Not Detected     Bordetella Parapertussis ([id number]) Not Detected     Bordetella pertussis (ptxP) Not Detected     Chlamydia pneumoniae Not Detected     Mycoplasma pneumoniae Not Detected     Comment: Respiratory Infection Panel testing performed by Multiplex PCR.       Narrative:      Respiratory Infection Panel source->NP Swab    Culture, Respiratory with Gram Stain [[id number]]     Order Status: Sent Specimen: Respiratory from Sputum, Expectorated             MOST RECENT IMAGING  X-Ray KUB  Narrative: EXAMINATION:  XR KUB    CLINICAL HISTORY:  abd pain/N/V;    FINDINGS:  Two abdominal radiographs at 11:05 hours compared to prior exams including CT 10/17/2024 show a nonobstructive bowel gas pattern.  There is scattered air in the colon, with moderate volume of stool in the distal colon and rectum.  No evidence of intraperitoneal free air.    Rim calcification in the right upper quadrant is unchanged, with no suspicious calcifications overlying  the kidneys or ureters.  There are multiple calcified pelvic phleboliths, with right upper quadrant cholecystectomy clips.  There is rightward convex lumbar spinal curvature, with intervertebral disc space narrowing and facet arthropathy in the spine.    The bones are diffusely osteopenic.  Right hip hardware is incompletely visualized.  There are extensive interstitial and airspace opacities in the visualized lower lungs.  Impression: Nonobstructive bowel gas pattern.    Electronically signed by: Davide Jeffrey  Date:    11/02/2024  Time:    11:18      CURRENT VISIT EKG  Results for orders placed or performed during the hospital encounter of 11/01/24   EKG 12-lead   Result Value Ref Range    QRS Duration 56 ms    OHS QTC Calculation 482 ms    Narrative    Test Reason : R06.02,    Vent. Rate : 097 BPM     Atrial Rate : 097 BPM     P-R Int : 156 ms          QRS Dur : 056 ms      QT Int : 380 ms       P-R-T Axes : 044 -06 100 degrees     QTc Int : 482 ms    Normal sinus rhythm  Low voltage QRS  Septal infarct (cited on or before 13-OCT-2023)  Abnormal ECG  When compared with ECG of 25-OCT-2024 10:48,  ST now depressed in Lateral leads    Referred By: [email or web address]   SELF           Confirmed By:        ECHOCARDIOGRAM RESULTS  Results for orders placed during the hospital encounter of 10/07/24    Echo    Interpretation Summary    Left Ventricle: The left ventricle is normal in size. Mildly increased wall thickness. There is mild concentric hypertrophy. Moderate global hypokinesis present. There is mildly reduced systolic function with a visually estimated ejection fraction of 40 - 45%. There is normal diastolic function.    Right Ventricle: Normal right ventricular cavity size. Wall thickness is normal. Systolic function is normal.    Left Atrium: Left atrium is moderately dilated.    Mitral Valve: There is bileaflet sclerosis. There is moderate mitral annular calcification present. There is moderate stenosis. The mean  pressure gradient across the mitral valve is 8 mmHg at a heart rate of  bpm. There is mild regurgitation with a centrally directed jet.    Pulmonary Artery: The estimated pulmonary artery systolic pressure is 29 mmHg.    IVC/SVC: Normal venous pressure at 3 mmHg.        Oxygen INFORMATION       5 L           PLAN:.      Pneumonitis involving the left lung  - patient has no signs or symptoms of pneumonia  - this was not there 2 weeks ago making progression of her cancer unlikely  - most likely the Alimta  - do not feel a bronchoscopy which would require intubation for this patient is necessary at this time  - Continue duo nebs   - will check chest x-ray in a.m. if it is improved, as I perceive it will be, will start cutting her steroids significantly  - Continue HFNC   - The patient is DNR/DNI which is appropriate  Lepidic adenocarcinoma  - involving the remainder of the right lung  - there appears to be endobronchial disease now as well on the right  Pleural effusion  - persisting and circumferential  Emphysema  - continuing bronchodilators  Acute on chronic hypoxemic respiratory failure  - continue weaning oxygen      Neva Christian MD  Date of Service: 11/06/2024  11:55 AM

## 2024-11-07 NOTE — ASSESSMENT & PLAN NOTE
Likely secondary to her bilateral extensive pneumonia.  On 4L NC at home  Currently on high flow nasal cannula and intermittent bipap  Pulm consulted   CTA chest ruled out PE.  Echo done last month showed normal systolic function.   Pneumonia vs. Pneumonitis? - pulm added prednisone initially and escalated to IV solumedrol for possible Pemetrexed induced pneumonitis   IV lasix on 11/3  Currently on 3 L

## 2024-11-07 NOTE — PLAN OF CARE
Problem: Physical Therapy  Goal: Physical Therapy Goal  Description: Goals to be met by: 24     Patient will increase functional independence with mobility by performin. Supine to sit with Supervision  2. Sit to stand transfer with Supervision  3. Bed to chair transfer with Supervision using Rolling Walker  4. Gait  x 100 feet with Supervision using Rolling Walker.         Outcome: Progressing

## 2024-11-07 NOTE — ASSESSMENT & PLAN NOTE
Patient was started on cefepime, doxy for atypicals, and Vanc, vanc d/c'ed with negative MRSA screen  Pulmonary and Infectious Disease following  Antibiotic  therapy complete    Antibiotics (From admission, onward)      Start     Stop Route Frequency Ordered    11/01/24 1300  mupirocin 2 % ointment         11/06/24 0859 Nasl 2 times daily 11/01/24 1200            Microbiology Results (last 7 days)       Procedure Component Value Units Date/Time    Blood culture x two cultures. Draw prior to antibiotics. [[id number]] Collected: 11/01/24 1045    Order Status: Completed Specimen: Blood from Peripheral, Antecubital, Left Updated: 11/06/24 1032     Blood Culture, Routine No growth after 5 days.    Narrative:      Aerobic and anaerobic    Blood culture x two cultures. Draw prior to antibiotics. [[id number]] Collected: 11/01/24 1051    Order Status: Completed Specimen: Blood from Peripheral, Antecubital, Right Updated: 11/06/24 1032     Blood Culture, Routine No growth after 5 days.    Narrative:      Aerobic and anaerobic    MRSA Screen by PCR [[id number]] Collected: 11/02/24 1329    Order Status: Completed Specimen: Nasal Swab Updated: 11/02/24 1532     MRSA SCREEN BY PCR Negative    Respiratory Infection Panel (PCR), Nasopharyngeal [[id number]] Collected: 11/02/24 1329    Order Status: Completed Specimen: Nasopharyngeal Swab Updated: 11/02/24 1509     Respiratory Infection Panel Source NP swab     Adenovirus Not Detected     Coronavirus 229E, Common Cold Virus Not Detected     Coronavirus HKU1, Common Cold Virus Not Detected     Coronavirus NL63, Common Cold Virus Not Detected     Coronavirus OC43, Common Cold Virus Not Detected     Comment: Coronavirus strains 229E, HKU1, NL63, and OC43 can cause the common   cold   and are not associated with the respiratory disease outbreak caused   by  the COVID-19 (SARS-CoV-2 novel Coronavirus) strain.           SARS-CoV2 (COVID-19) Qualitative PCR Not Detected     Human  Metapneumovirus Not Detected     Human Rhinovirus/Enterovirus Not Detected     Influenza A (subtypes H1, H1-2009,H3) Not Detected     Influenza B Not Detected     Parainfluenza Virus 1 Not Detected     Parainfluenza Virus 2 Not Detected     Parainfluenza Virus 3 Not Detected     Parainfluenza Virus 4 Not Detected     Respiratory Syncytial Virus Not Detected     Bordetella Parapertussis (ME8080) Not Detected     Bordetella pertussis (ptxP) Not Detected     Chlamydia pneumoniae Not Detected     Mycoplasma pneumoniae Not Detected     Comment: Respiratory Infection Panel testing performed by Multiplex PCR.       Narrative:      Respiratory Infection Panel source->NP Swab    Culture, Respiratory with Gram Stain [[id number]]     Order Status: Sent Specimen: Respiratory from Sputum, Expectorated

## 2024-11-07 NOTE — ASSESSMENT & PLAN NOTE
Constipation likely contributing  She had a couple of Bms and started bowel regimen  Priest placed for significant retention   D/C priest 11/5 and had to be reinserted on 11/6  Flomax initiated  Urology consulted

## 2024-11-07 NOTE — PLAN OF CARE
Patient: Praveen Vilchis is a 31 y.o. male who presents today with the following Chief Complaint(s):  Chief Complaint   Patient presents with    New Patient     Needs a primary care physician, currently in [facility]     Hemorrhoids     Wants an Anusol script        HPI  New patient.  Presents to the office complaining of a cough and rectal pain related to hemorrhoids.  -He is a patient of [facility].    Cough  This is a chronic problem. The current episode started 1 to 4 weeks ago. The problem has been gradually improving. The problem occurs every few minutes. The cough is Productive of sputum. Associated symptoms include nasal congestion and rhinorrhea. Pertinent negatives include no ear congestion, sore throat or wheezing. Nothing aggravates the symptoms. Treatments tried: Antibiotics. The treatment provided mild relief.   Hemorrhoids  The current episode started more than 1 month ago. The problem occurs constantly. Associated symptoms include coughing. Pertinent negatives include no abdominal pain or sore throat. Associated symptoms comments: Shooting rectal pain. Nothing aggravates the symptoms. He has tried nothing for the symptoms.         Current Outpatient Medications   Medication Sig Dispense Refill    ARIPiprazole (ABILIFY) 5 MG tablet Take 1 tablet by mouth daily      divalproex (DEPAKOTE) 500 MG DR tablet Take 1 tablet by mouth 2 times daily      benzonatate (TESSALON) 100 MG capsule Take 1-2 capsules by mouth 3 times daily as needed for Cough 60 capsule 0    fluticasone (FLONASE) 50 MCG/ACT nasal spray 2 sprays by Each Nostril route daily 16 g 0    hydrocortisone (ANUSOL-HC) 25 MG suppository Place 1 suppository rectally in the morning and 1 suppository in the evening. 12 suppository 0    pimozide (ORAP) 2 MG tablet Take 1 tablet by mouth 2 times daily       No current facility-administered medications for this visit.       Patient's past medical history, surgical    Problem: Adult Inpatient Plan of Care  Goal: Plan of Care Review  Outcome: Progressing  Goal: Patient-Specific Goal (Individualized)  Outcome: Progressing  Goal: Absence of Hospital-Acquired Illness or Injury  Outcome: Progressing  Goal: Optimal Comfort and Wellbeing  Outcome: Progressing  Goal: Readiness for Transition of Care  Outcome: Progressing     Problem: Sepsis/Septic Shock  Goal: Optimal Coping  Outcome: Progressing  Goal: Absence of Bleeding  Outcome: Progressing  Goal: Blood Glucose Level Within Targeted Range  Outcome: Progressing  Goal: Absence of Infection Signs and Symptoms  Outcome: Progressing  Goal: Optimal Nutrition Intake  Outcome: Progressing     Problem: Pneumonia  Goal: Fluid Balance  Outcome: Progressing  Goal: Resolution of Infection Signs and Symptoms  Outcome: Progressing  Goal: Effective Oxygenation and Ventilation  Outcome: Progressing     Problem: Fall Injury Risk  Goal: Absence of Fall and Fall-Related Injury  Outcome: Progressing     Problem: Skin Injury Risk Increased  Goal: Skin Health and Integrity  Outcome: Progressing     Problem: Infection  Goal: Absence of Infection Signs and Symptoms  Outcome: Progressing     Problem: Gas Exchange Impaired  Goal: Optimal Gas Exchange  Outcome: Progressing     Problem: Wound  Goal: Optimal Coping  Outcome: Progressing  Goal: Optimal Functional Ability  Outcome: Progressing  Goal: Absence of Infection Signs and Symptoms  Outcome: Progressing  Goal: Improved Oral Intake  Outcome: Progressing  Goal: Optimal Pain Control and Function  Outcome: Progressing  Goal: Skin Health and Integrity  Outcome: Progressing  Goal: Optimal Wound Healing  Outcome: Progressing

## 2024-11-07 NOTE — PT/OT/SLP PROGRESS
Physical Therapy Treatment    Patient Name:  Alexa Otero   MRN:  [id number]    Recommendations:     Discharge Recommendations: Moderate Intensity Therapy (vs Low w/ 24/7 assist (pending progress/oxygen requirements))  Discharge Equipment Recommendations: walker, rolling  Barriers to discharge:  increased assist with mobility, decreased activity tolerance, balance deficits    Assessment:     Alexa Otero is a 84 y.o. female admitted with a medical diagnosis of Acute on chronic hypoxic respiratory failure.  She presents with the following impairments/functional limitations: weakness, impaired endurance, impaired self care skills, impaired functional mobility, gait instability, impaired balance, impaired cardiopulmonary response to activity.    Pt agreeable to visit, wanting to attempt to ambulate and sit in chair.    Pt min assist for supine to sit transfer. Pt on 3 L O2 via NC. Pt performed sit to stand transfer with mod assist x 2 and RW. Pt unable to maintain standing for long before requesting to sit. Pt desatted to 70s requiring increased to 6 L O2 and extra time focusing on pursed lip breathing to recover to the 90s.    Ambulation deferred due to pt desatting with just standing. Pt performed stand pivot transfer from bed to chair with mod assist x 2. Pt desatted to 85% on 6 L O2, but was able to recover > 90% in under 2 minutes with verbal cuing for pursed lip breathing. Nurse informed and pt to stay on 6 L O2 as she desatted when attempted to titrate her back down.    Rehab Prognosis: Fair; patient would benefit from acute skilled PT services to address these deficits and reach maximum level of function.    Recent Surgery: * No surgery found *      Plan:     During this hospitalization, patient to be seen 5 x/week to address the identified rehab impairments via gait training, therapeutic activities, therapeutic exercises and progress toward the following goals:    Plan of Care Expires:  12/02/24    Subjective      Chief Complaint: fatigue  Patient/Family Comments/goals: to get better  Pain/Comfort:  Pain Rating 1: 0/10      Objective:     Communicated with nurse prior to session.  Patient found HOB elevated with PureWick, telemetry, pulse ox (continuous), oxygen upon PT entry to room.     General Precautions: Standard, fall  Orthopedic Precautions: N/A  Braces: N/A  Respiratory Status: Nasal cannula, flow 3 L/min increased to 6 L/min     Functional Mobility:  Bed Mobility:     Supine to Sit: minimum assistance  Sit to Supine: minimum assistance  Transfers:     Sit to Stand:  moderate assistance and of 2 persons with rolling walker  Bed to Chair: moderate assistance and of 2 persons with  no AD  using  Stand Pivot      AM-PAC 6 CLICK MOBILITY          Treatment & Education:  Pt educated on importance of time OOB, importance of intermittent mobility, safe techniques for transfers/ambulation, discharge recommendations/options, and use of call light for assistance and fall prevention.      Patient left up in chair with all lines intact, call button in reach, chair alarm on, and nurse notified..    GOALS:   Multidisciplinary Problems       Physical Therapy Goals          Problem: Physical Therapy    Goal Priority Disciplines Outcome Interventions   Physical Therapy Goal     PT, PT/OT     Description: Goals to be met by: 24     Patient will increase functional independence with mobility by performin. Supine to sit with Supervision  2. Sit to stand transfer with Supervision  3. Bed to chair transfer with Supervision using Rolling Walker  4. Gait  x 100 feet with Supervision using Rolling Walker.                              Time Tracking:     PT Received On: 24  PT Start Time: 952     PT Stop Time: 1022  PT Total Time (min): 30 min     Billable Minutes: Therapeutic Activity 30    Treatment Type: Treatment  PT/PTA: PTA     Number of PTA visits since last PT visit: 3     2024

## 2024-11-07 NOTE — RESPIRATORY THERAPY
11/07/24 0639   Patient Assessment/Suction   Level of Consciousness (AVPU) alert   Respiratory Effort Unlabored   All Lung Fields Breath Sounds   (fairly clear, diminished)   PRE-TX-O2   Device (Oxygen Therapy) high flow nasal cannula   Flow (L/min) (Oxygen Therapy) 4  (Decreased to 3LPM)   SpO2 96 %   Pulse Oximetry Type Intermittent   $ Pulse Oximetry - Multiple Charge Pulse Oximetry - Multiple   Pulse 67   Resp 16   Aerosol Therapy   $ Aerosol Therapy Charges Aerosol Treatment   Respiratory Treatment Status (SVN) given   Treatment Route (SVN) mask   Patient Position HOB elevated   Post Treatment Assessment (SVN) increased aeration   Signs of Intolerance (SVN) none   Breath Sounds Post-Respiratory Treatment   Post-treatment Heart Rate (beats/min) 68   Post-treatment Resp Rate (breaths/min) 16

## 2024-11-07 NOTE — CARE UPDATE
"D/W ZION Medellin of hospital medicine concern of pt in retention, noting to have failed voiding trial 2d ago and priest reinserted.   Only details are 11/3 had 1L retention  No further details since. ZION Medellin unaware, just noted in report priest needed to be readmitted  Pt had Ecoli UTI as outpt 10/18. Admit UA negative  Admitted for "acute respiratory failure secondary to pneumonia versus pneumonitis receiving IV steroids and oxygen supplementation."  Noted last BM today, unknown prior to today  CT outpt 10/18 has full rectal vault.   Noted to have started flomax  Having had retention x2 this admit while debilitated, advised ok to cont flomax, use bowel regimen to avoid/resolve underlying constipation, and priest would need to remain minimum of 5-7 days on this regimen before and a attempts at voiding trial, and she can be set up for outpatient urology follow-up.  New patient appointment booked with ZION Campos on Wednesday 11/13/24 in clinic at 8:00 a.m. for further outpatient evaluation.  No further inpatient recommendation or evalaution.  Communicated to ZION Medellin  "

## 2024-11-07 NOTE — PROGRESS NOTES
[facility] Medicine  Progress Note    Patient Name: Alexa Otero  MRN: [id number]  Patient Class: IP- Inpatient   Admission Date: 11/1/2024  Length of Stay: 6 days  Attending Physician: Callie Cherry MD  Primary Care Provider: Idalia Greco MD        Subjective:     Principal Problem:Acute on chronic hypoxic respiratory failure        HPI:  84-year-old female with a past medical history of lung cancer actively getting chemo last of which was 5 days ago, who presented to the ER because of generalized weakness.  According to the patient, she woke up today, tried to get out of bed, but felt very weak, and slid beside the bed.  Did not hit her head, and did not lose consciousness.  She however could not get up however, and her  could not assist.  911 was called.  On arrival of EMS, patient was satting 88% on 4 L, and appeared to be short of breath.  She was brought to the ER for evaluation.    In the ER, vitals showed a blood pressure of 145/65, heart rate of 103, respiratory rate of 20, afebrile satting 60% on 4 L.  Immediately patient was placed on non-rebreather.  CBC with white count of 16.8, and macrocytic anemia.  CMP showed hypokalemia of 3.4.  First troponin is elevated at 46.  Point of care Lactic acid was 2.1.  Blood cultures were collected.  EKG showed sinus rhythm.  Chest x-ray showed no significant change of bilateral diffuse mixed interstitial and airspace lung opacities.  CTA chest was done, and it was negative for PE, but showed extensive interstitial and airspace opacities throughout both lungs, having significantly worsened in the left lung compared to prior CT. Unchanged small to moderate sized bilateral pleural effusions.  Cefepime was ordered, and patient will be admitted to Medicine for further care of her severe sepsis.    Overview/Hospital Course:  Ms. Otero has been monitored closely during her hospitalization. She was admitted on 11/1/2024 with acute on  chronic hypoxemic resp failure. CTA chest reveals extensive interstitial opacities c/w multifocal pna and/or ARDS. She requires high flow nasal cannula up to 15L now on 13L. Pulm and ID have been consulted. Pulm recommends broad spectrum abx - cefepime, doxy, and vanc initially. MRSA screen negative and vanc d/c'ed. She has stage IV lung ca and completed Carbo/Pemetrexed last year and is currently on pemetrexed (Alimta) maintenance with last dose 10/28. This can cause an interstitial pneumonitis and pulm has started prednisone. Plan for bronchoscopy on Monday if no improvement and recommended continuing eliquis. BiPAP qHS and naps. Duonebs Q6h. ID has ordered a respiratory infection panel that is negative. She's had abdominal bloating and discomfort for some time and had an outpt CT abd/pelvis with gastric wall thickening that is is nonspecific could be related to gastritis. She has a history of peptic ulcers and PCP started protonix/carafate. KUB on 11/2 with nonobstructive bowel gas pattern and moderate stool burden and pt was treated with laxative suppository. She has chronic macrocytic anemia that appears at baseline. She had a leukocytosis with left shift on admission that has trended down from 16K-->12K-->8K. CRP 36, procal 2.9. Pt had a BM on 11/2, but abd remained distended. She was found to be retaining a significant amount of urine on bladder scan >900 and priest was placed with 1500mL out with resolution of abd distention. Pulm increased steriods on 11/3 to IV solumedrol, she's requiring BiPAP support and was given IV lasix. Macrocytic anemia at baseline on admission but trending down and she will receive 1 unit PRBCs 11/4. She has developed steroid induced hyperglycemia and insulin sliding titrated up to moderate dose for tighter glucose control. Priest d/c'ed on 11/5. PT/OT consulted and pt up to chair on 11/5. She desatted with movement and took some time and increased supplemental O2 to recover. Prior  to moving to the chair, O2 was weaned down to 10L high flow.    Interval History:   Patient seen and examined.  Patient currently on 3 L nasal cannula.  Patient is sitting up in chair.  Chest x-ray shows improvement.  If thorax is not yet back to normal.  Patient had urinary retention urology has been consulted.  Review of Systems   Constitutional:  Positive for fatigue. Negative for chills and fever.   Respiratory:  Positive for cough and shortness of breath (With Exertion).    Cardiovascular: Negative.    Gastrointestinal: Negative.    Genitourinary: Negative.    Neurological:  Positive for weakness.     Objective:     Vital Signs (Most Recent):  Temp: 97.4 °F (36.3 °C) (11/07/24 1142)  Pulse: 65 (11/07/24 1332)  Resp: 16 (11/07/24 1332)  BP: 132/77 (11/07/24 1142)  SpO2: 98 % (11/07/24 1332) Vital Signs (24h Range):  Temp:  [97.4 °F (36.3 °C)-97.7 °F (36.5 °C)] 97.4 °F (36.3 °C)  Pulse:  [65-77] 65  Resp:  [16-22] 16  SpO2:  [92 %-98 %] 98 %  BP: (124-151)/(64-77) 132/77     Weight: 49.5 kg (109 lb 2 oz)  Body mass index is 18.73 kg/m².    Intake/Output Summary (Last 24 hours) at 11/7/2024 1424  Last data filed at 11/7/2024 1049  Gross per 24 hour   Intake 1000 ml   Output 2150 ml   Net -1150 ml         Physical Exam  Constitutional:       Comments: Chronically ill appearing   HENT:      Head: Normocephalic and atraumatic.      Nose: Nose normal.      Mouth/Throat:      Mouth: Mucous membranes are moist.      Pharynx: Oropharynx is clear.   Eyes:      Pupils: Pupils are equal, round, and reactive to light.   Cardiovascular:      Rate and Rhythm: Normal rate.   Pulmonary:      Effort: Pulmonary effort is normal.      Comments: Coarse and diminished throughout  Abdominal:      Comments: Abd is firm, mildly distended and nontender Bsx4 quadrants   Musculoskeletal:         General: Normal range of motion.      Cervical back: Normal range of motion and neck supple.   Skin:     General: Skin is warm.      Capillary  Refill: Capillary refill takes 2 to 3 seconds.      Findings: Bruising present.   Neurological:      General: No focal deficit present.      Mental Status: She is alert and oriented to person, place, and time.   Psychiatric:         Mood and Affect: Mood normal.         Behavior: Behavior normal.             Significant Labs: All pertinent labs within the past 24 hours have been reviewed.  CBC:   Recent Labs   Lab 11/06/24 0321 11/07/24  0600   WBC 2.92* 4.70   HGB 9.7* 9.1*   HCT 31.8* 28.5*   * 101*     CMP:   Recent Labs   Lab 11/06/24 0321 11/07/24  0600   * 136   K 5.2* 5.3*   CL 96 95   CO2 31* 36*   * 227*   BUN 58* 53*   CREATININE 1.5* 1.4   CALCIUM 8.7 8.7   PROT 5.7* 5.5*   ALBUMIN 2.7* 2.6*   BILITOT 0.4 0.4   ALKPHOS 50* 48*   AST 15 12   ALT 21 20   ANIONGAP 6* 5*     Magnesium:   Recent Labs   Lab 11/06/24 0321 11/07/24  0600   MG 2.2 2.1       Significant Imaging: I have reviewed all pertinent imaging results/findings within the past 24 hours.  Imaging Results              CTA Chest Non-Coronary (PE Studies) (Final result)  Result time 11/01/24 12:51:32      Final result by Davide Jeffrey MD (11/01/24 12:51:32)                   Impression:      1. Negative for pulmonary thromboembolism.  2. Extensive interstitial and airspace opacities throughout both lungs, having significantly worsened in the left lung compared to prior CT.  Multifocal pneumonia, drug reaction, and or developing ARDS could have this appearance.  3. Unchanged small to moderate sized bilateral pleural effusions.  4. Additional observations as described.  .      Electronically signed by: Davide Jeffrey  Date:    11/01/2024  Time:    12:51               Narrative:    EXAMINATION:  CTA CHEST NON CORONARY (PE STUDIES)    CLINICAL HISTORY:  Pulmonary embolism (PE) suspected, high prob; lung cancer    TECHNIQUE:  Thin axial imaging through the chest was performed with 100 mL Omnipaque 350 IV contrast, with sagittal  and coronal reformatted images and MIP reconstructions performed, with images stored in the patient's permanent electronic medical record.    FINDINGS:  Comparison to multiple prior exams.  There are no pulmonary arterial filling defects to suggest pulmonary thromboembolism.  The central pulmonary arteries are normal in caliber.    Diffuse calcified plaque involves normal-caliber thoracic aorta, with the heart enlarged and no pericardial effusion.  There are extensive coronary arterial calcifications, with dense mitral annular calcifications.  No enlarged mediastinal or hilar lymph nodes.    There is unchanged volume loss in the right hemithorax, with extensive interstitial and airspace opacities throughout both lungs, in the left lung having significantly worsened compared to the 10/07/2024.  There are scattered lucencies reflecting pulmonary emphysema.  Small to moderate sized low-density bilateral pleural effusions are unchanged, with the central airways patent.  No pneumothorax.    Images of the upper abdomen show stable hepatic hypodensity, cholecystectomy clips, and scattered colon diverticula.  There are no acute fractures or destructive osseous lesions.                                       X-Ray Chest AP Portable (Final result)  Result time 11/01/24 10:39:57      Final result by Kp Leiva DO (11/01/24 10:39:57)                   Impression:      No significant change of bilateral diffuse mixed interstitial and airspace lung opacities.      Electronically signed by: Kp Leiva  Date:    11/01/2024  Time:    10:39               Narrative:    EXAMINATION:  XR CHEST AP PORTABLE    CLINICAL HISTORY:  Sepsis;    FINDINGS:  Portable chest with comparison chest x-ray 10/10/2024.  Normal cardiomediastinal silhouette.Bilateral diffuse mixed interstitial and airspace lung opacities are unchanged from previous exam.  Right subclavian Port-A-Cath is unchanged.  Pulmonary vasculature is normal. No acute  osseous abnormality.                                       Assessment/Plan:      * Acute on chronic hypoxic respiratory failure  Likely secondary to her bilateral extensive pneumonia.  On 4L NC at home  Currently on high flow nasal cannula and intermittent bipap  Pulm consulted   CTA chest ruled out PE.  Echo done last month showed normal systolic function.   Pneumonia vs. Pneumonitis? 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 Most recent hemoglobin and hematocrit are listed below.  Recent Labs     11/05/24  0654 11/06/24  0321 11/07/24  0600   HGB 8.3* 9.7* 9.1*   HCT 25.5* 31.8* 28.5*       Plan  - Monitor serial CBC: Daily  - Transfuse PRBC if patient becomes hemodynamically unstable, symptomatic or H/H drops below 7/21.  - Patient has received 1 units of PRBCs on 11/4  - Patient's anemia is currently worsening. Will continue current treatment  - T&S in AM in the event she requires a transfusion    Multifocal pneumonia vs. pneumonitis  Patient was started on cefepime, doxy for atypicals, and Vanc, vanc d/c'ed with negative MRSA screen  Pulmonary and Infectious Disease following  Antibiotic  therapy complete    Antibiotics (From admission, onward)      Start     Stop Route Frequency Ordered    11/01/24 1300  mupirocin 2 % ointment         11/06/24 0859 Nasl 2 times daily 11/01/24 1200            Microbiology Results (last 7 days)       Procedure Component Value Units Date/Time    Blood culture x two cultures. Draw prior to antibiotics. [[id number]] Collected: 11/01/24 1045    Order Status: Completed  Specimen: Blood from Peripheral, Antecubital, Left Updated: 11/06/24 1032     Blood Culture, Routine No growth after 5 days.    Narrative:      Aerobic and anaerobic    Blood culture x two cultures. Draw prior to antibiotics. [[id number]] Collected: 11/01/24 1051    Order Status: Completed Specimen: Blood from Peripheral, Antecubital, Right Updated: 11/06/24 1032     Blood Culture, Routine No growth after 5 days.    Narrative:      Aerobic and anaerobic    MRSA Screen by PCR [[id number]] Collected: 11/02/24 1329    Order Status: Completed Specimen: Nasal Swab Updated: 11/02/24 1532     MRSA SCREEN BY PCR Negative    Respiratory Infection Panel (PCR), Nasopharyngeal [[id number]] Collected: 11/02/24 1329    Order Status: Completed Specimen: Nasopharyngeal Swab Updated: 11/02/24 1509     Respiratory Infection Panel Source NP swab     Adenovirus Not Detected     Coronavirus 229E, Common Cold Virus Not Detected     Coronavirus HKU1, Common Cold Virus Not Detected     Coronavirus NL63, Common Cold Virus Not Detected     Coronavirus OC43, Common Cold Virus Not Detected     Comment: Coronavirus strains 229E, HKU1, NL63, and OC43 can cause the common   cold   and are not associated with the respiratory disease outbreak caused   by  the COVID-19 (SARS-CoV-2 novel Coronavirus) strain.           SARS-CoV2 (COVID-19) Qualitative PCR Not Detected     Human Metapneumovirus Not Detected     Human Rhinovirus/Enterovirus Not Detected     Influenza A (subtypes H1, H1-2009,H3) Not Detected     Influenza B Not Detected     Parainfluenza Virus 1 Not Detected     Parainfluenza Virus 2 Not Detected     Parainfluenza Virus 3 Not Detected     Parainfluenza Virus 4 Not Detected     Respiratory Syncytial Virus Not Detected     Bordetella Parapertussis (IT2856) Not Detected     Bordetella pertussis (ptxP) Not Detected     Chlamydia pneumoniae Not Detected     Mycoplasma pneumoniae Not Detected     Comment: Respiratory Infection Panel testing  performed by Multiplex PCR.       Narrative:      Respiratory Infection Panel source->NP Swab    Culture, Respiratory with Gram Stain [[id number]]     Order Status: Sent Specimen: Respiratory from Sputum, Expectorated             Pleural effusion  Bilateral, stable.   Pulm consulted  In the setting of lung ca      Malignant neoplasm of lower lobe of right lung  Carbo/Pemetrexed until 4/2024 now on Pemetrexed maintenance with last dose 10/28/24  Consult pt's oncologist, Dr. Cassidy following      Atrial fibrillation  Patient has paroxysmal (<7 days) atrial fibrillation. Patient is currently in sinus rhythm. [id number] Score: 5. The patients heart rate in the last 24 hours is as follows:  Pulse  Min: 64  Max: 73     Antiarrhythmics  Amiodarone and metoprolol    Anticoagulants  Eliquis    Plan  - Replete lytes with a goal of K>4, Mg >2  - Patient is anticoagulated, see medications listed above.  - Patient's afib is currently controlled      VTE Risk Mitigation (From admission, onward)           Ordered     apixaban tablet 2.5 mg  2 times daily         11/01/24 1316     IP VTE HIGH RISK PATIENT  Once         11/01/24 1158     Place sequential compression device  Until discontinued         11/01/24 1158                    Discharge Planning   ALYCIA: 11/11/2024     Code Status: DNR   Is the patient medically ready for discharge?:     Reason for patient still in hospital (select all that apply): Patient trending condition, Treatment, Imaging, and Consult recommendations  Discharge Plan A: Home Health                  Susan Medellin NP  Department of Hospital Medicine   [facility]

## 2024-11-07 NOTE — CARE UPDATE
11/06/24 1933   Patient Assessment/Suction   Level of Consciousness (AVPU) alert   Respiratory Effort Unlabored   Expansion/Accessory Muscles/Retractions no retractions;no use of accessory muscles   All Lung Fields Breath Sounds Anterior:;Lateral:;diminished   Rhythm/Pattern, Respiratory depth regular;pattern regular   Skin Integrity   $ Wound Care Tech Time 15 min   Area Observed Left;Nares   Skin Appearance without discoloration   PRE-TX-O2   Device (Oxygen Therapy) high flow nasal cannula   Flow (L/min) (Oxygen Therapy) 4   SpO2 (!) 92 %   Pulse Oximetry Type Intermittent   $ Pulse Oximetry - Multiple Charge Pulse Oximetry - Multiple   Pulse 72   Resp 18   Aerosol Therapy   $ Aerosol Therapy Charges Aerosol Treatment   Daily Review of Necessity (SVN) completed   Respiratory Treatment Status (SVN) given   Treatment Route (SVN) mask   Patient Position HOB elevated   Post Treatment Assessment (SVN) increased aeration   Signs of Intolerance (SVN) none   Breath Sounds Post-Respiratory Treatment   Throughout All Fields Post-Treatment All Fields   Throughout All Fields Post-Treatment aeration increased   Post-treatment Heart Rate (beats/min) 75   Post-treatment Resp Rate (breaths/min) 18   Preset CPAP/BiPAP Settings   Mode Of Delivery BiPAP;Standby   CPAP/BIPAP charged w/in last 24 h YES   $ Is patient using? No/refused   Reason patient is not wearing? Patient refused   Equipment Type V60

## 2024-11-07 NOTE — ASSESSMENT & PLAN NOTE
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 Most recent hemoglobin and hematocrit are listed below.  Recent Labs     11/05/24  0654 11/06/24  0321 11/07/24  0600   HGB 8.3* 9.7* 9.1*   HCT 25.5* 31.8* 28.5*       Plan  - Monitor serial CBC: Daily  - Transfuse PRBC if patient becomes hemodynamically unstable, symptomatic or H/H drops below 7/21.  - Patient has received 1 units of PRBCs on 11/4  - Patient's anemia is currently worsening. Will continue current treatment  - T&S in AM in the event she requires a transfusion

## 2024-11-07 NOTE — SUBJECTIVE & OBJECTIVE
Interval History:   Patient seen and examined.  Patient currently on 3 L nasal cannula.  Patient is sitting up in chair.  Chest x-ray shows improvement.  If thorax is not yet back to normal.  Patient had urinary retention urology has been consulted.  Review of Systems   Constitutional:  Positive for fatigue. Negative for chills and fever.   Respiratory:  Positive for cough and shortness of breath (With Exertion).    Cardiovascular: Negative.    Gastrointestinal: Negative.    Genitourinary: Negative.    Neurological:  Positive for weakness.     Objective:     Vital Signs (Most Recent):  Temp: 97.4 °F (36.3 °C) (11/07/24 1142)  Pulse: 65 (11/07/24 1332)  Resp: 16 (11/07/24 1332)  BP: 132/77 (11/07/24 1142)  SpO2: 98 % (11/07/24 1332) Vital Signs (24h Range):  Temp:  [97.4 °F (36.3 °C)-97.7 °F (36.5 °C)] 97.4 °F (36.3 °C)  Pulse:  [65-77] 65  Resp:  [16-22] 16  SpO2:  [92 %-98 %] 98 %  BP: (124-151)/(64-77) 132/77     Weight: 49.5 kg (109 lb 2 oz)  Body mass index is 18.73 kg/m².    Intake/Output Summary (Last 24 hours) at 11/7/2024 1424  Last data filed at 11/7/2024 1049  Gross per 24 hour   Intake 1000 ml   Output 2150 ml   Net -1150 ml         Physical Exam  Constitutional:       Comments: Chronically ill appearing   HENT:      Head: Normocephalic and atraumatic.      Nose: Nose normal.      Mouth/Throat:      Mouth: Mucous membranes are moist.      Pharynx: Oropharynx is clear.   Eyes:      Pupils: Pupils are equal, round, and reactive to light.   Cardiovascular:      Rate and Rhythm: Normal rate.   Pulmonary:      Effort: Pulmonary effort is normal.      Comments: Coarse and diminished throughout  Abdominal:      Comments: Abd is firm, mildly distended and nontender Bsx4 quadrants   Musculoskeletal:         General: Normal range of motion.      Cervical back: Normal range of motion and neck supple.   Skin:     General: Skin is warm.      Capillary Refill: Capillary refill takes 2 to 3 seconds.      Findings:  Bruising present.   Neurological:      General: No focal deficit present.      Mental Status: She is alert and oriented to person, place, and time.   Psychiatric:         Mood and Affect: Mood normal.         Behavior: Behavior normal.             Significant Labs: All pertinent labs within the past 24 hours have been reviewed.  CBC:   Recent Labs   Lab 11/06/24 0321 11/07/24  0600   WBC 2.92* 4.70   HGB 9.7* 9.1*   HCT 31.8* 28.5*   * 101*     CMP:   Recent Labs   Lab 11/06/24 0321 11/07/24  0600   * 136   K 5.2* 5.3*   CL 96 95   CO2 31* 36*   * 227*   BUN 58* 53*   CREATININE 1.5* 1.4   CALCIUM 8.7 8.7   PROT 5.7* 5.5*   ALBUMIN 2.7* 2.6*   BILITOT 0.4 0.4   ALKPHOS 50* 48*   AST 15 12   ALT 21 20   ANIONGAP 6* 5*     Magnesium:   Recent Labs   Lab 11/06/24 0321 11/07/24  0600   MG 2.2 2.1       Significant Imaging: I have reviewed all pertinent imaging results/findings within the past 24 hours.  Imaging Results              CTA Chest Non-Coronary (PE Studies) (Final result)  Result time 11/01/24 12:51:32      Final result by Davide Jeffrey MD (11/01/24 12:51:32)                   Impression:      1. Negative for pulmonary thromboembolism.  2. Extensive interstitial and airspace opacities throughout both lungs, having significantly worsened in the left lung compared to prior CT.  Multifocal pneumonia, drug reaction, and or developing ARDS could have this appearance.  3. Unchanged small to moderate sized bilateral pleural effusions.  4. Additional observations as described.  .      Electronically signed by: Davide Jefrfey  Date:    11/01/2024  Time:    12:51               Narrative:    EXAMINATION:  CTA CHEST NON CORONARY (PE STUDIES)    CLINICAL HISTORY:  Pulmonary embolism (PE) suspected, high prob; lung cancer    TECHNIQUE:  Thin axial imaging through the chest was performed with 100 mL Omnipaque 350 IV contrast, with sagittal and coronal reformatted images and MIP reconstructions  performed, with images stored in the patient's permanent electronic medical record.    FINDINGS:  Comparison to multiple prior exams.  There are no pulmonary arterial filling defects to suggest pulmonary thromboembolism.  The central pulmonary arteries are normal in caliber.    Diffuse calcified plaque involves normal-caliber thoracic aorta, with the heart enlarged and no pericardial effusion.  There are extensive coronary arterial calcifications, with dense mitral annular calcifications.  No enlarged mediastinal or hilar lymph nodes.    There is unchanged volume loss in the right hemithorax, with extensive interstitial and airspace opacities throughout both lungs, in the left lung having significantly worsened compared to the 10/07/2024.  There are scattered lucencies reflecting pulmonary emphysema.  Small to moderate sized low-density bilateral pleural effusions are unchanged, with the central airways patent.  No pneumothorax.    Images of the upper abdomen show stable hepatic hypodensity, cholecystectomy clips, and scattered colon diverticula.  There are no acute fractures or destructive osseous lesions.                                       X-Ray Chest AP Portable (Final result)  Result time 11/01/24 10:39:57      Final result by Kp Leiva DO (11/01/24 10:39:57)                   Impression:      No significant change of bilateral diffuse mixed interstitial and airspace lung opacities.      Electronically signed by: Kp Leiva  Date:    11/01/2024  Time:    10:39               Narrative:    EXAMINATION:  XR CHEST AP PORTABLE    CLINICAL HISTORY:  Sepsis;    FINDINGS:  Portable chest with comparison chest x-ray 10/10/2024.  Normal cardiomediastinal silhouette.Bilateral diffuse mixed interstitial and airspace lung opacities are unchanged from previous exam.  Right subclavian Port-A-Cath is unchanged.  Pulmonary vasculature is normal. No acute osseous abnormality.

## 2024-11-07 NOTE — PROGRESS NOTES
Pulmonary/Critical Care Progress Note      PATIENT NAME: Alexa Otero  MRN: [id number]  TODAY'S DATE: 2024  11:55 AM  ADMIT DATE: 2024  AGE: 84 y.o. 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    She reports progressive fatigue weakness prior to admission to the hospital.  She reports that she slid out of her bed and that is the reason she came to the hospital.  She reports that she has not had any fever cough night sweats, but she has some worsening shortness of breath.    11/3   - rough night, had worsening shortness of breath and is required non-rebreather in addition no nasal cannula currently she is on high-flow at 10 liters/minute     the patient is feeling a little better today.  She is on 15 L high-flow nasal cannula.  Her CT shows her chronically infiltrated right lung with the surrounding effusion.  Her left lung which was normal looking 2 weeks ago now is diffusely infiltrated with ground-glass opacities.  This must be the Alimta.     the patient is improving with the steroids.  She looks much brighter today.  She is feeling better.  She is on 10 L high-flow nasal cannula.     the patient is very sleepy today.  The nurse reports she was up earlier and ate breakfast and sat up.  Her oxygen is set on 10 L.  Her sats remain 99% on 5 L. I have asked the nurse to try to wean this.     the patient is looking good today.  She is down to 3 L nasal cannula.  Her chest x-ray is improving but her left thorax is not yet normal. We need to get her priest out and her walking.    Review of Systems    Constitutional:  Negative for chills, fever and unexpected weight change.   HENT:  Negative for nosebleeds, postnasal drip, trouble swallowing and voice change.    Eyes:  Negative for visual disturbance.   Respiratory:  Negative for cough and wheezing.    Cardiovascular:  Negative for chest pain and leg swelling.   Gastrointestinal:  Positive for constipation. Negative for diarrhea, nausea and vomiting.   Endocrine: Negative for cold intolerance and heat intolerance.   Genitourinary:  Negative for dysuria, flank pain and frequency (.jppe).   Musculoskeletal:  Negative for neck pain and neck stiffness.   Skin:  Positive for wound.   Allergic/Immunologic: Negative for environmental allergies and immunocompromised state.   Neurological:  Negative for syncope, speech difficulty and weakness.   Hematological:  Negative for adenopathy.   Psychiatric/Behavioral:  Negative for confusion.          No change in the patient's Past Medical History, Past Surgical History, Social History or Family History since admission.    VITAL SIGNS (MOST RECENT)  Temp: 97.4 °F (36.3 °C) (11/07/24 0757)  Pulse: 73 (11/07/24 0757)  Resp: 16 (11/07/24 0639)  BP: 137/64 (11/07/24 0757)  SpO2: (!) 92 % (11/07/24 0757)    INTAKE AND OUTPUT (LAST 24 HOURS):  Intake/Output Summary (Last 24 hours) at 11/7/2024 0902  Last data filed at 11/7/2024 0443  Gross per 24 hour   Intake 900 ml   Output 1850 ml   Net -950 ml       WEIGHT  Wt Readings from Last 1 Encounters:   11/01/24 49.5 kg (109 lb 2 oz)         PHYSICAL EXAM  GENERAL: Older patient in no distress, looking frail.  HEENT: Pupils equal and reactive. Extraocular movements intact. Nose intact.  Pharynx moist.  NECK: Supple.   HEART: Regular rate and rhythm. No murmur or gallop auscultated.  LUNGS:  There are diminished breath sounds on the right.  On the left there are diffuse crackles.  Lung excursion symmetrical. No change in fremitus.  There is a port in thorax which is accessed.  ABDOMEN:  Bowel sounds present. Non-tender, no masses palpated.  : Normal anatomy. Rausch with yellow urine.  EXTREMITIES: Normal muscle tone and joint movement, no cyanosis or clubbing.   LYMPHATICS: No adenopathy palpated, no edema.  SKIN: Dry, intact, no lesions.  There is a stage I decubitus on the buttock.    NEURO: Cranial nerves II-XII intact. Motor strength 5/5 bilaterally, upper and lower extremities.  PSYCH: Appropriate affect.        CBC LAST (LAST 24 HOURS)  Recent Labs   Lab 11/07/24  0600   WBC 4.70   RBC 2.82*   HGB 9.1*   HCT 28.5*   *   MCH 32.3*   MCHC 31.9*   RDW 17.7*   *   MPV 10.5   GRAN 77.6*  3.7   LYMPH 6.8*  0.3*   MONO 14.3  0.7   BASO 0.00   NRBC 0       CHEMISTRY LAST (LAST 24 HOURS)  Recent Labs   Lab 11/06/24  1249 11/07/24  0600   NA  --  136   K  --  5.3*   CL  --  95   CO2  --  36*   ANIONGAP  --  5*   BUN  --  53*   CREATININE  --  1.4   GLU  --  227*   CALCIUM  --  8.7   PH 7.442  --    MG  --  2.1   ALBUMIN  --  2.6*   PROT  --  5.5*   ALKPHOS  --  48*   ALT  --  20   AST  --  12   BILITOT  --  0.4           CARDIAC PROFILE (LAST 24 HOURS)  Recent Labs   Lab 11/01/24  0950 11/02/24  1335   *  --    LDH  --  413*   TROPONINIHS 46.9*  --        LAST 7 DAYS MICROBIOLOGY   Microbiology Results (last 7 days)       Procedure Component Value Units Date/Time    Blood culture x two cultures. Draw prior to antibiotics. [[id number]] Collected: 11/01/24 1045    Order Status: Completed Specimen: Blood from Peripheral, Antecubital, Left Updated: 11/06/24 1032     Blood Culture, Routine No growth after 5 days.    Narrative:      Aerobic and anaerobic    Blood culture x two cultures. Draw prior to antibiotics. [[id number]] Collected: 11/01/24 1051    Order Status: Completed Specimen: Blood from Peripheral, Antecubital, Right Updated: 11/06/24 1032     Blood Culture, Routine No growth after 5 days.    Narrative:      Aerobic and anaerobic    MRSA Screen by PCR [[id number]]  Collected: 11/02/24 1329    Order Status: Completed Specimen: Nasal Swab Updated: 11/02/24 1532     MRSA SCREEN BY PCR Negative    Respiratory Infection Panel (PCR), Nasopharyngeal [[id number]] Collected: 11/02/24 1329    Order Status: Completed Specimen: Nasopharyngeal Swab Updated: 11/02/24 1509     Respiratory Infection Panel Source NP swab     Adenovirus Not Detected     Coronavirus 229E, Common Cold Virus Not Detected     Coronavirus HKU1, Common Cold Virus Not Detected     Coronavirus NL63, Common Cold Virus Not Detected     Coronavirus OC43, Common Cold Virus Not Detected     Comment: Coronavirus strains 229E, HKU1, NL63, and OC43 can cause the common   cold   and are not associated with the respiratory disease outbreak caused   by  the COVID-19 (SARS-CoV-2 novel Coronavirus) strain.           SARS-CoV2 (COVID-19) Qualitative PCR Not Detected     Human Metapneumovirus Not Detected     Human Rhinovirus/Enterovirus Not Detected     Influenza A (subtypes H1, H1-2009,H3) Not Detected     Influenza B Not Detected     Parainfluenza Virus 1 Not Detected     Parainfluenza Virus 2 Not Detected     Parainfluenza Virus 3 Not Detected     Parainfluenza Virus 4 Not Detected     Respiratory Syncytial Virus Not Detected     Bordetella Parapertussis (LY0070) Not Detected     Bordetella pertussis (ptxP) Not Detected     Chlamydia pneumoniae Not Detected     Mycoplasma pneumoniae Not Detected     Comment: Respiratory Infection Panel testing performed by Multiplex PCR.       Narrative:      Respiratory Infection Panel source->NP Swab    Culture, Respiratory with Gram Stain [[id number]]     Order Status: Sent Specimen: Respiratory from Sputum, Expectorated             MOST RECENT IMAGING  X-Ray Chest AP Portable  Narrative: EXAMINATION:  XR CHEST AP PORTABLE    CLINICAL HISTORY:  Pneumonitis and bronchogenic carcinoma with a fusion;    FINDINGS:  Portable chest radiograph at 06:09 hours compared to prior exams shows right  subclavian injection port type central venous catheter, unchanged in position.  The cardiomediastinal silhouette and pulmonary vasculature are stable.    Volume loss in the right hemithorax is unchanged, with stable bilateral interstitial and airspace opacities, and bilateral pleural effusions.  No new pleural or parenchymal abnormality.  Impression: No significant interval change.    Electronically signed by: Davide Jeffrey  Date:    11/07/2024  Time:    07:17      CURRENT VISIT EKG  Results for orders placed or performed during the hospital encounter of 11/01/24   EKG 12-lead   Result Value Ref Range    QRS Duration 56 ms    [facility] QTC Calculation 482 ms    Narrative    Test Reason : R06.02,    Vent. Rate : 097 BPM     Atrial Rate : 097 BPM     P-R Int : 156 ms          QRS Dur : 056 ms      QT Int : 380 ms       P-R-T Axes : 044 -06 100 degrees     QTc Int : 482 ms    Normal sinus rhythm  Low voltage QRS  Septal infarct (cited on or before 13-OCT-2023)  Abnormal ECG  When compared with ECG of 25-OCT-2024 10:48,  ST now depressed in Lateral leads    Referred By: [email or web address]   SELF           Confirmed By:        ECHOCARDIOGRAM RESULTS  Results for orders placed during the hospital encounter of 10/07/24    Echo    Interpretation Summary    Left Ventricle: The left ventricle is normal in size. Mildly increased wall thickness. There is mild concentric hypertrophy. Moderate global hypokinesis present. There is mildly reduced systolic function with a visually estimated ejection fraction of 40 - 45%. There is normal diastolic function.    Right Ventricle: Normal right ventricular cavity size. Wall thickness is normal. Systolic function is normal.    Left Atrium: Left atrium is moderately dilated.    Mitral Valve: There is bileaflet sclerosis. There is moderate mitral annular calcification present. There is moderate stenosis. The mean pressure gradient across the mitral valve is 8 mmHg at a heart rate of  bpm. There is mild  regurgitation with a centrally directed jet.    Pulmonary Artery: The estimated pulmonary artery systolic pressure is 29 mmHg.    IVC/SVC: Normal venous pressure at 3 mmHg.        Oxygen INFORMATION       3 L           PLAN:.      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

## 2024-11-08 NOTE — ASSESSMENT & PLAN NOTE
Likely secondary to her bilateral extensive pneumonia vs pneumonitis  On 4L NC at home  Currently on high flow nasal cannula and intermittent bipap  Pulm consulted   CTA chest ruled out PE.  Echo done last month showed normal systolic function.   Pneumonia vs. Pneumonitis? - pulm added prednisone initially and escalated to IV solumedrol for possible Pemetrexed induced pneumonitis   IV lasix on 11/3  Currently on 3 L  11/7 pulmonale switch patient to p.o. prednisone.  And recommended a taper over several weeks  Repeat chest x-ray in 2 weeks

## 2024-11-08 NOTE — ASSESSMENT & PLAN NOTE
Patient has paroxysmal (<7 days) atrial fibrillation. Patient is currently in sinus rhythm. PIVCK2MREc Score: 5. The patients heart rate in the last 24 hours is as follows:  Pulse  Min: 69  Max: 78     Antiarrhythmics  Amiodarone and metoprolol    Anticoagulants  Eliquis    Plan  - Replete lytes with a goal of K>4, Mg >2  - Patient is anticoagulated, see medications listed above.  - Patient's afib is currently controlled

## 2024-11-08 NOTE — SUBJECTIVE & OBJECTIVE
Interval History:   Patient seen and examined.  NAD.  Stable on 3L of oxygen.  Steroids switch to p.o. per pulmonology. sCr improving.  Case management working on SNF placement  Review of Systems   Constitutional:  Positive for fatigue. Negative for chills and fever.   Respiratory:  Positive for cough and shortness of breath (With Exertion).    Cardiovascular: Negative.    Gastrointestinal: Negative.    Genitourinary: Negative.    Neurological:  Positive for weakness.     Objective:     Vital Signs (Most Recent):  Temp: 97.3 °F (36.3 °C) (11/08/24 1122)  Pulse: 72 (11/08/24 1122)  Resp: 20 (11/08/24 1122)  BP: (!) 117/56 (11/08/24 1122)  SpO2: 100 % (11/08/24 0644) Vital Signs (24h Range):  Temp:  [97 °F (36.1 °C)-97.6 °F (36.4 °C)] 97.3 °F (36.3 °C)  Pulse:  [69-78] 72  Resp:  [18-20] 20  SpO2:  [94 %-100 %] 100 %  BP: (117-147)/(52-73) 117/56     Weight: 49.5 kg (109 lb 2 oz)  Body mass index is 18.73 kg/m².    Intake/Output Summary (Last 24 hours) at 11/8/2024 1348  Last data filed at 11/8/2024 1059  Gross per 24 hour   Intake 1240 ml   Output 3945 ml   Net -2705 ml         Physical Exam  Vitals and nursing note reviewed. Exam conducted with a chaperone present.   Constitutional:       Comments: Chronically ill appearing   HENT:      Head: Normocephalic and atraumatic.      Nose: Nose normal.      Mouth/Throat:      Mouth: Mucous membranes are moist.      Pharynx: Oropharynx is clear.   Eyes:      Pupils: Pupils are equal, round, and reactive to light.   Cardiovascular:      Rate and Rhythm: Normal rate.   Pulmonary:      Effort: Pulmonary effort is normal.      Comments: Coarse and diminished throughout  Abdominal:      Comments: Abd is firm, mildly distended and nontender Bsx4 quadrants   Musculoskeletal:         General: Normal range of motion.      Cervical back: Normal range of motion and neck supple.   Skin:     General: Skin is warm.      Capillary Refill: Capillary refill takes 2 to 3 seconds.       Findings: Bruising present.   Neurological:      General: No focal deficit present.      Mental Status: She is alert and oriented to person, place, and time.   Psychiatric:         Mood and Affect: Mood normal.         Behavior: Behavior normal.             Significant Labs: All pertinent labs within the past 24 hours have been reviewed.  CBC:   Recent Labs   Lab 11/07/24 0600 11/08/24  0441   WBC 4.70 5.34   HGB 9.1* 9.0*   HCT 28.5* 27.9*   * 85*     CMP:   Recent Labs   Lab 11/07/24 0600 11/08/24  0441    136   K 5.3* 4.7   CL 95 96   CO2 36* 36*   * 143*   BUN 53* 46*   CREATININE 1.4 1.4   CALCIUM 8.7 8.4*   PROT 5.5* 5.0*   ALBUMIN 2.6* 2.5*   BILITOT 0.4 0.4   ALKPHOS 48* 42*   AST 12 13   ALT 20 18   ANIONGAP 5* 4*     Magnesium:   Recent Labs   Lab 11/07/24 0600 11/08/24 0441   MG 2.1 2.0     POCT Glucose:   Recent Labs   Lab 11/07/24 1958 11/08/24  0821 11/08/24  1212   POCTGLUCOSE 263* 126* 228*       Significant Imaging: I have reviewed all pertinent imaging results/findings within the past 24 hours.  Imaging Results              CTA Chest Non-Coronary (PE Studies) (Final result)  Result time 11/01/24 12:51:32      Final result by Davide Jeffrey MD (11/01/24 12:51:32)                   Impression:      1. Negative for pulmonary thromboembolism.  2. Extensive interstitial and airspace opacities throughout both lungs, having significantly worsened in the left lung compared to prior CT.  Multifocal pneumonia, drug reaction, and or developing ARDS could have this appearance.  3. Unchanged small to moderate sized bilateral pleural effusions.  4. Additional observations as described.  .      Electronically signed by: Davide Jeffrey  Date:    11/01/2024  Time:    12:51               Narrative:    EXAMINATION:  CTA CHEST NON CORONARY (PE STUDIES)    CLINICAL HISTORY:  Pulmonary embolism (PE) suspected, high prob; lung cancer    TECHNIQUE:  Thin axial imaging through the chest was  performed with 100 mL Omnipaque 350 IV contrast, with sagittal and coronal reformatted images and MIP reconstructions performed, with images stored in the patient's permanent electronic medical record.    FINDINGS:  Comparison to multiple prior exams.  There are no pulmonary arterial filling defects to suggest pulmonary thromboembolism.  The central pulmonary arteries are normal in caliber.    Diffuse calcified plaque involves normal-caliber thoracic aorta, with the heart enlarged and no pericardial effusion.  There are extensive coronary arterial calcifications, with dense mitral annular calcifications.  No enlarged mediastinal or hilar lymph nodes.    There is unchanged volume loss in the right hemithorax, with extensive interstitial and airspace opacities throughout both lungs, in the left lung having significantly worsened compared to the 10/07/2024.  There are scattered lucencies reflecting pulmonary emphysema.  Small to moderate sized low-density bilateral pleural effusions are unchanged, with the central airways patent.  No pneumothorax.    Images of the upper abdomen show stable hepatic hypodensity, cholecystectomy clips, and scattered colon diverticula.  There are no acute fractures or destructive osseous lesions.                                       X-Ray Chest AP Portable (Final result)  Result time 11/01/24 10:39:57      Final result by Kp Leiva DO (11/01/24 10:39:57)                   Impression:      No significant change of bilateral diffuse mixed interstitial and airspace lung opacities.      Electronically signed by: Kp Leiva  Date:    11/01/2024  Time:    10:39               Narrative:    EXAMINATION:  XR CHEST AP PORTABLE    CLINICAL HISTORY:  Sepsis;    FINDINGS:  Portable chest with comparison chest x-ray 10/10/2024.  Normal cardiomediastinal silhouette.Bilateral diffuse mixed interstitial and airspace lung opacities are unchanged from previous exam.  Right subclavian Port-A-Cath  is unchanged.  Pulmonary vasculature is normal. No acute osseous abnormality.

## 2024-11-08 NOTE — ASSESSMENT & PLAN NOTE
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 Most recent hemoglobin and hematocrit are listed below.  Recent Labs     11/06/24  0321 11/07/24  0600 11/08/24  0441   HGB 9.7* 9.1* 9.0*   HCT 31.8* 28.5* 27.9*       Plan  - Monitor serial CBC: Daily  - Transfuse PRBC if patient becomes hemodynamically unstable, symptomatic or H/H drops below 7/21.  - Patient has received 1 units of PRBCs on 11/4  - Patient's anemia is currently stable  - T&S in AM in the event she requires a transfusion

## 2024-11-08 NOTE — PT/OT/SLP PROGRESS
Occupational Therapy   Treatment    Name: Alexa Otero  MRN: [id number]  Admitting Diagnosis:  Acute on chronic hypoxic respiratory failure       Recommendations:     Discharge Recommendations: Moderate Intensity Therapy  Discharge Equipment Recommendations:   (to be determined by next level of care)  Barriers to discharge:  Decreased caregiver support, Other (Comment) (increased assist with ADLs and mobility)    Assessment:     Alexa Otero is a 84 y.o. female with a medical diagnosis of Acute on chronic hypoxic respiratory failure.  She presents with forward flexion and L lean after scooting to edge of chair with hip dissociation with ModA, heavily flexed, almost pushing, forward, requiring ModA and constant VC to facilitate pt cooperation to counteract this movement. She is Mod/MaxA for chair<>BSC transfer this date and unable to assist with any portion of toileting except the voiding of the large BM. Pt bed mobility was much improved with increased time and only SBA for encouragement and completion. Pt is motivated. Performance deficits affecting function are weakness, impaired endurance, impaired self care skills, impaired functional mobility, gait instability, impaired balance, impaired cognition, decreased upper extremity function, decreased lower extremity function, decreased safety awareness, pain, impaired cardiopulmonary response to activity.     Rehab Prognosis:  Fair; patient would benefit from acute skilled OT services to address these deficits and reach maximum level of function.       Plan:     Patient to be seen 6 x/week to address the above listed problems via self-care/home management, therapeutic activities, therapeutic exercises  Plan of Care Expires: 12/06/24  Plan of Care Reviewed with: patient, spouse, daughter    Subjective     Chief Complaint: need to void BM  Patient/Family Comments/goals: get to inpatient rehab  Pain/Comfort:  Pain Rating 1: 0/10    Objective:     Communicated with:  nurseJoycelyn prior to session.  Patient found up in chair with PureWick, oxygen, telemetry, pulse ox (continuous), blood pressure cuff, peripheral IV upon OT entry to room.    General Precautions: Standard, fall    Orthopedic Precautions:N/A  Braces: N/A  Respiratory Status: Nasal cannula, flow 6 L/min     Occupational Performance:     Bed Mobility:    Patient completed Rolling/Turning to Left with  contact guard assistance and with side rail for brief mgmnt.  Patient completed Rolling/Turning to Right with minimum assistance and with side rail for brief mgmnt.  Patient completed Scooting backwards into bed with stand by assistance, increased time and VC.  Patient completed scooting forward to edge of chair and edge of BSC with MaxA and no understanding of hip dissociation.  Patient completed Sit to Supine with contact guard assistance and at BLE.     Functional Mobility/Transfers:  Chair to BSC to R with ModA one step transfer  BSC>EOB to R with Rosanna and one step transfer  Functional Mobility: POOR    Activities of Daily Living:  Toileting: maximal assistance as pt cannot manage brief or hygiene at this time.    [facility] 6 Click ADL: 16    Treatment & Education:  -PEREIRA ed for call button use, role of IZZY, benefit to increased OOB time more frequently rather prolonged sitting, use of lateral WS to scoot forward.  -pt and family v/u and agreeable.    Patient left HOB elevated with all lines intact, call button in reach, and nurseJoycelyn notified    GOALS:   Multidisciplinary Problems       Occupational Therapy Goals          Problem: Occupational Therapy    Goal Priority Disciplines Outcome Interventions   Occupational Therapy Goal     OT, PT/OT Progressing    Description: Goals to be met by: 12/06/2024     Patient will increase functional independence with ADLs by performing:    UE Dressing with Supervision.  LE Dressing with Supervision.  Grooming while standing at sink with Supervision.  Toileting from bedside  commode with Supervision for hygiene and clothing management.   Toilet transfer to bedside commode with Supervision.                         Time Tracking:     OT Date of Treatment: 11/07/24  OT Start Time: 1402  OT Stop Time: 1430  OT Total Time (min): 28 min    Billable Minutes:Self Care/Home Management 28 min    OT/IZZY: IZZY     Number of IZZY visits since last OT visit: 1    11/7/2024

## 2024-11-08 NOTE — PLAN OF CARE
PT is recommending moderate intensity. Pt spoke with provider and perez Mcdaniel. DEB sent referral via [facility] and messaged Fracne.     11/08/24 1337   Post-Acute Status   Post-Acute Authorization Placement   Post-Acute Placement Status Referrals Sent   Discharge Plan   Discharge Plan A Skilled Nursing Facility       1439- LOCET called and PASRR faxed to the Office of Aging. ([phone]). PASRR scanned into . DEB awaiting [phone] approved and scanned into .

## 2024-11-08 NOTE — PROGRESS NOTES
[facility] Medicine  Progress Note    Patient Name: Alexa Otero  MRN: [id number]  Patient Class: IP- Inpatient   Admission Date: 11/1/2024  Length of Stay: 7 days  Attending Physician: Arthur Vyas MD  Primary Care Provider: Idalia Greco MD        Subjective:     Principal Problem:Acute on chronic hypoxic respiratory failure        HPI:  84-year-old female with a past medical history of lung cancer actively getting chemo last of which was 5 days ago, who presented to the ER because of generalized weakness.  According to the patient, she woke up today, tried to get out of bed, but felt very weak, and slid beside the bed.  Did not hit her head, and did not lose consciousness.  She however could not get up however, and her  could not assist.  911 was called.  On arrival of EMS, patient was satting 88% on 4 L, and appeared to be short of breath.  She was brought to the ER for evaluation.    In the ER, vitals showed a blood pressure of 145/65, heart rate of 103, respiratory rate of 20, afebrile satting 60% on 4 L.  Immediately patient was placed on non-rebreather.  CBC with white count of 16.8, and macrocytic anemia.  CMP showed hypokalemia of 3.4.  First troponin is elevated at 46.  Point of care Lactic acid was 2.1.  Blood cultures were collected.  EKG showed sinus rhythm.  Chest x-ray showed no significant change of bilateral diffuse mixed interstitial and airspace lung opacities.  CTA chest was done, and it was negative for PE, but showed extensive interstitial and airspace opacities throughout both lungs, having significantly worsened in the left lung compared to prior CT. Unchanged small to moderate sized bilateral pleural effusions.  Cefepime was ordered, and patient will be admitted to Medicine for further care of her severe sepsis.    Overview/Hospital Course:  Ms. Otero has been monitored closely during her hospitalization. She was admitted on 11/1/2024 with acute on  chronic hypoxemic resp failure. CTA chest reveals extensive interstitial opacities c/w multifocal pna and/or ARDS. She requires high flow nasal cannula up to 15L now on 13L. Pulm and ID have been consulted. Pulm recommends broad spectrum abx - cefepime, doxy, and vanc initially. MRSA screen negative and vanc d/c'ed. She has stage IV lung ca and completed Carbo/Pemetrexed last year and is currently on pemetrexed (Alimta) maintenance with last dose 10/28. This can cause an interstitial pneumonitis and pulm has started prednisone. Plan for bronchoscopy on Monday if no improvement and recommended continuing eliquis. BiPAP qHS and naps. Duonebs Q6h. ID has ordered a respiratory infection panel that is negative. She's had abdominal bloating and discomfort for some time and had an outpt CT abd/pelvis with gastric wall thickening that is is nonspecific could be related to gastritis. She has a history of peptic ulcers and PCP started protonix/carafate. KUB on 11/2 with nonobstructive bowel gas pattern and moderate stool burden and pt was treated with laxative suppository. She has chronic macrocytic anemia that appears at baseline. She had a leukocytosis with left shift on admission that has trended down from 16K-->12K-->8K. CRP 36, procal 2.9. Pt had a BM on 11/2, but abd remained distended. She was found to be retaining a significant amount of urine on bladder scan >900 and priest was placed with 1500mL out with resolution of abd distention. Pulm increased steriods on 11/3 to IV solumedrol, she's requiring BiPAP support and was given IV lasix. Macrocytic anemia at baseline on admission but trending down and she will receive 1 unit PRBCs 11/4. She has developed steroid induced hyperglycemia and insulin sliding titrated up to moderate dose for tighter glucose control. Priest d/c'ed on 11/5. PT/OT consulted and pt up to chair on 11/5. She desatted with movement and took some time and increased supplemental O2 to recover. Prior  to moving to the chair, O2 was weaned down to 10L high flow.    Interval History:   Patient seen and examined.  NAD.  Stable on 3L of oxygen.  Steroids switch to p.o. per pulmonology. sCr improving.  Case management working on SNF placement  Review of Systems   Constitutional:  Positive for fatigue. Negative for chills and fever.   Respiratory:  Positive for cough and shortness of breath (With Exertion).    Cardiovascular: Negative.    Gastrointestinal: Negative.    Genitourinary: Negative.    Neurological:  Positive for weakness.     Objective:     Vital Signs (Most Recent):  Temp: 97.3 °F (36.3 °C) (11/08/24 1122)  Pulse: 72 (11/08/24 1122)  Resp: 20 (11/08/24 1122)  BP: (!) 117/56 (11/08/24 1122)  SpO2: 100 % (11/08/24 0644) Vital Signs (24h Range):  Temp:  [97 °F (36.1 °C)-97.6 °F (36.4 °C)] 97.3 °F (36.3 °C)  Pulse:  [69-78] 72  Resp:  [18-20] 20  SpO2:  [94 %-100 %] 100 %  BP: (117-147)/(52-73) 117/56     Weight: 49.5 kg (109 lb 2 oz)  Body mass index is 18.73 kg/m².    Intake/Output Summary (Last 24 hours) at 11/8/2024 1348  Last data filed at 11/8/2024 1059  Gross per 24 hour   Intake 1240 ml   Output 3945 ml   Net -2705 ml         Physical Exam  Vitals and nursing note reviewed. Exam conducted with a chaperone present.   Constitutional:       Comments: Chronically ill appearing   HENT:      Head: Normocephalic and atraumatic.      Nose: Nose normal.      Mouth/Throat:      Mouth: Mucous membranes are moist.      Pharynx: Oropharynx is clear.   Eyes:      Pupils: Pupils are equal, round, and reactive to light.   Cardiovascular:      Rate and Rhythm: Normal rate.   Pulmonary:      Effort: Pulmonary effort is normal.      Comments: Coarse and diminished throughout  Abdominal:      Comments: Abd is firm, mildly distended and nontender Bsx4 quadrants   Musculoskeletal:         General: Normal range of motion.      Cervical back: Normal range of motion and neck supple.   Skin:     General: Skin is warm.       Capillary Refill: Capillary refill takes 2 to 3 seconds.      Findings: Bruising present.   Neurological:      General: No focal deficit present.      Mental Status: She is alert and oriented to person, place, and time.   Psychiatric:         Mood and Affect: Mood normal.         Behavior: Behavior normal.             Significant Labs: All pertinent labs within the past 24 hours have been reviewed.  CBC:   Recent Labs   Lab 11/07/24 0600 11/08/24  0441   WBC 4.70 5.34   HGB 9.1* 9.0*   HCT 28.5* 27.9*   * 85*     CMP:   Recent Labs   Lab 11/07/24 0600 11/08/24  0441    136   K 5.3* 4.7   CL 95 96   CO2 36* 36*   * 143*   BUN 53* 46*   CREATININE 1.4 1.4   CALCIUM 8.7 8.4*   PROT 5.5* 5.0*   ALBUMIN 2.6* 2.5*   BILITOT 0.4 0.4   ALKPHOS 48* 42*   AST 12 13   ALT 20 18   ANIONGAP 5* 4*     Magnesium:   Recent Labs   Lab 11/07/24  0600 11/08/24  0441   MG 2.1 2.0     POCT Glucose:   Recent Labs   Lab 11/07/24 1958 11/08/24  0821 11/08/24  1212   POCTGLUCOSE 263* 126* 228*       Significant Imaging: I have reviewed all pertinent imaging results/findings within the past 24 hours.  Imaging Results              CTA Chest Non-Coronary (PE Studies) (Final result)  Result time 11/01/24 12:51:32      Final result by Davide Jeffrey MD (11/01/24 12:51:32)                   Impression:      1. Negative for pulmonary thromboembolism.  2. Extensive interstitial and airspace opacities throughout both lungs, having significantly worsened in the left lung compared to prior CT.  Multifocal pneumonia, drug reaction, and or developing ARDS could have this appearance.  3. Unchanged small to moderate sized bilateral pleural effusions.  4. Additional observations as described.  .      Electronically signed by: Davide Jeffrey  Date:    11/01/2024  Time:    12:51               Narrative:    EXAMINATION:  CTA CHEST NON CORONARY (PE STUDIES)    CLINICAL HISTORY:  Pulmonary embolism (PE) suspected, high prob; lung  cancer    TECHNIQUE:  Thin axial imaging through the chest was performed with 100 mL Omnipaque 350 IV contrast, with sagittal and coronal reformatted images and MIP reconstructions performed, with images stored in the patient's permanent electronic medical record.    FINDINGS:  Comparison to multiple prior exams.  There are no pulmonary arterial filling defects to suggest pulmonary thromboembolism.  The central pulmonary arteries are normal in caliber.    Diffuse calcified plaque involves normal-caliber thoracic aorta, with the heart enlarged and no pericardial effusion.  There are extensive coronary arterial calcifications, with dense mitral annular calcifications.  No enlarged mediastinal or hilar lymph nodes.    There is unchanged volume loss in the right hemithorax, with extensive interstitial and airspace opacities throughout both lungs, in the left lung having significantly worsened compared to the 10/07/2024.  There are scattered lucencies reflecting pulmonary emphysema.  Small to moderate sized low-density bilateral pleural effusions are unchanged, with the central airways patent.  No pneumothorax.    Images of the upper abdomen show stable hepatic hypodensity, cholecystectomy clips, and scattered colon diverticula.  There are no acute fractures or destructive osseous lesions.                                       X-Ray Chest AP Portable (Final result)  Result time 11/01/24 10:39:57      Final result by Kp Leiva DO (11/01/24 10:39:57)                   Impression:      No significant change of bilateral diffuse mixed interstitial and airspace lung opacities.      Electronically signed by: Kp Leiva  Date:    11/01/2024  Time:    10:39               Narrative:    EXAMINATION:  XR CHEST AP PORTABLE    CLINICAL HISTORY:  Sepsis;    FINDINGS:  Portable chest with comparison chest x-ray 10/10/2024.  Normal cardiomediastinal silhouette.Bilateral diffuse mixed interstitial and airspace lung opacities  are unchanged from previous exam.  Right subclavian Port-A-Cath is unchanged.  Pulmonary vasculature is normal. No acute osseous abnormality.                                       Assessment/Plan:      * Acute on chronic hypoxic respiratory failure  Likely secondary to her bilateral extensive pneumonia vs pneumonitis  On 4L NC at home  Currently on high flow nasal cannula and intermittent bipap  Pulm consulted   CTA chest ruled out PE.  Echo done last month showed normal systolic function.   Pneumonia vs. Pneumonitis? - pulm added prednisone initially and escalated to IV solumedrol for possible Pemetrexed induced pneumonitis   IV lasix on 11/3  Currently on 3 L  11/7 pulmonale switch patient to p.o. prednisone.  And recommended a taper over several weeks  Repeat chest x-ray in 2 weeks      Urinary retention  Constipation likely contributing  She had a couple of Bms and started bowel regimen  Priest placed for significant retention   D/C priest 11/5 and had to be reinserted on 11/6  Flomax initiated  Urology consulted:  Recommended follow up outpatient.  Keep Priest in place for 5-7 days before voiding trial.  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 Most recent hemoglobin and hematocrit are listed below.  Recent Labs     11/06/24  0321 11/07/24  0600 11/08/24  0441   HGB 9.7* 9.1* 9.0*   HCT 31.8* 28.5* 27.9*       Plan  - Monitor serial CBC: Daily  - Transfuse PRBC if patient becomes hemodynamically unstable, symptomatic or H/H drops below 7/21.  - Patient has received 1 units of PRBCs on 11/4  - Patient's anemia is currently stable  - T&S in AM in the event she requires a transfusion    Multifocal pneumonia vs. pneumonitis  Patient was started on cefepime, doxy for atypicals, and Vanc, vanc d/c'ed with negative MRSA screen  Pulmonary and Infectious Disease following  Antibiotic therapy complete    Antibiotics (From admission, onward)      Start     Stop Route  Frequency Ordered    11/01/24 1300  mupirocin 2 % ointment         11/06/24 0859 Nasl 2 times daily 11/01/24 1200            Microbiology Results (last 7 days)       Procedure Component Value Units Date/Time    Blood culture x two cultures. Draw prior to antibiotics. [[id number]] Collected: 11/01/24 1045    Order Status: Completed Specimen: Blood from Peripheral, Antecubital, Left Updated: 11/06/24 1032     Blood Culture, Routine No growth after 5 days.    Narrative:      Aerobic and anaerobic    Blood culture x two cultures. Draw prior to antibiotics. [[id number]] Collected: 11/01/24 1051    Order Status: Completed Specimen: Blood from Peripheral, Antecubital, Right Updated: 11/06/24 1032     Blood Culture, Routine No growth after 5 days.    Narrative:      Aerobic and anaerobic    MRSA Screen by PCR [[id number]] Collected: 11/02/24 1329    Order Status: Completed Specimen: Nasal Swab Updated: 11/02/24 1532     MRSA SCREEN BY PCR Negative    Respiratory Infection Panel (PCR), Nasopharyngeal [[id number]] Collected: 11/02/24 1329    Order Status: Completed Specimen: Nasopharyngeal Swab Updated: 11/02/24 1509     Respiratory Infection Panel Source NP swab     Adenovirus Not Detected     Coronavirus 229E, Common Cold Virus Not Detected     Coronavirus HKU1, Common Cold Virus Not Detected     Coronavirus NL63, Common Cold Virus Not Detected     Coronavirus OC43, Common Cold Virus Not Detected     Comment: Coronavirus strains 229E, HKU1, NL63, and OC43 can cause the common   cold   and are not associated with the respiratory disease outbreak caused   by  the COVID-19 (SARS-CoV-2 novel Coronavirus) strain.           SARS-CoV2 (COVID-19) Qualitative PCR Not Detected     Human Metapneumovirus Not Detected     Human Rhinovirus/Enterovirus Not Detected     Influenza A (subtypes H1, H1-2009,H3) Not Detected     Influenza B Not Detected     Parainfluenza Virus 1 Not Detected     Parainfluenza Virus 2 Not Detected      Parainfluenza Virus 3 Not Detected     Parainfluenza Virus 4 Not Detected     Respiratory Syncytial Virus Not Detected     Bordetella Parapertussis (ZR1199) Not Detected     Bordetella pertussis (ptxP) Not Detected     Chlamydia pneumoniae Not Detected     Mycoplasma pneumoniae Not Detected     Comment: Respiratory Infection Panel testing performed by Multiplex PCR.       Narrative:      Respiratory Infection Panel source->NP Swab    Culture, Respiratory with Gram Stain [[id number]]     Order Status: Sent Specimen: Respiratory from Sputum, Expectorated             Pleural effusion  Bilateral, stable.   Pulm consulted  In the setting of lung ca      Malignant neoplasm of lower lobe of right lung  Carbo/Pemetrexed until 4/2024 now on Pemetrexed maintenance with last dose 10/28/24  Consult pt's oncologist, Dr. Cassidy following      Atrial fibrillation  Patient has paroxysmal (<7 days) atrial fibrillation. Patient is currently in sinus rhythm. [id number] Score: 5. The patients heart rate in the last 24 hours is as follows:  Pulse  Min: 69  Max: 78     Antiarrhythmics  Amiodarone and metoprolol    Anticoagulants  Eliquis    Plan  - Replete lytes with a goal of K>4, Mg >2  - Patient is anticoagulated, see medications listed above.  - Patient's afib is currently controlled      VTE Risk Mitigation (From admission, onward)           Ordered     apixaban tablet 2.5 mg  2 times daily         11/01/24 1316     IP VTE HIGH RISK PATIENT  Once         11/01/24 1158     Place sequential compression device  Until discontinued         11/01/24 1158                    Discharge Planning   ALYCIA: 11/11/2024     Code Status: DNR   Is the patient medically ready for discharge?:     Reason for patient still in hospital (select all that apply): Patient trending condition, Laboratory test, Treatment, Consult recommendations, and Pending disposition  Discharge Plan A: Skilled Nursing Facility                  Susan Medellin  NP  Department of Hospital Medicine   [facility]

## 2024-11-08 NOTE — ASSESSMENT & PLAN NOTE
Constipation likely contributing  She had a couple of Bms and started bowel regimen  Priest placed for significant retention   D/C priest 11/5 and had to be reinserted on 11/6  Flomax initiated  Urology consulted:  Recommended follow up outpatient.  Keep Priest in place for 5-7 days before voiding trial.  Continued Flomax

## 2024-11-08 NOTE — PT/OT/SLP PROGRESS
Occupational Therapy   Treatment    Name: Alexa Otero  MRN: [id number]  Admitting Diagnosis:  Acute on chronic hypoxic respiratory failure       Recommendations:     Discharge Recommendations: Moderate Intensity Therapy  Discharge Equipment Recommendations:   (to be determined by next level of care)  Barriers to discharge:  Decreased caregiver support (increased physical assistance for ADLs and functional mobility.)    Assessment:     Alexa Otero is a 84 y.o. female with a medical diagnosis of Acute on chronic hypoxic respiratory failure.  She presents with general weakness. Patient participated in bed mobility, unsupported sitting EOB and LB dressing sitting EOB. Performance deficits affecting function are weakness, impaired endurance, impaired self care skills, impaired functional mobility, gait instability, impaired balance, decreased safety awareness, decreased lower extremity function, decreased upper extremity function, impaired cardiopulmonary response to activity.     Rehab Prognosis:  Fair; patient would benefit from acute skilled OT services to address these deficits and reach maximum level of function.       Plan:     Patient to be seen 6 x/week to address the above listed problems via self-care/home management, therapeutic activities, therapeutic exercises  Plan of Care Expires: 12/06/24  Plan of Care Reviewed with: patient, spouse    Subjective     Chief Complaint: General weakness  Patient/Family Comments/goals: Improved functional mobility and ADL independence.  Pain/Comfort:  Pain Rating 1: 0/10  Pain Rating Post-Intervention 1: 0/10    Objective:     Communicated with: nurse prior to session.  Patient found HOB elevated with telemetry, peripheral IV, pulse ox (continuous), oxygen, priest catheter upon OT entry to room.    General Precautions: Standard, fall    Orthopedic Precautions:N/A  Braces: N/A  Respiratory Status: Nasal cannula, flow 6 L/min     Occupational Performance:     Bed Mobility:     Patient completed Scooting/Bridging with minimum assistance  Patient completed Supine to Sit with minimum assistance  Patient completed Sit to Supine with minimum assistance   Performed unsupported sitting EOB for 12 minutes with contact guard assistance.    Activities of Daily Living:  Lower Body Dressing: minimum assistance to don/doff socks sitting EOB.      [facility] 6 Click ADL:      Treatment & Education:  Patient tolerated sitting EOB for 12 minutes before fatigue and back pain set in. Returned to supine position at end of session.    Patient left HOB elevated with all lines intact, call button in reach, and spouse present    GOALS:   Multidisciplinary Problems       Occupational Therapy Goals          Problem: Occupational Therapy    Goal Priority Disciplines Outcome Interventions   Occupational Therapy Goal     OT, PT/OT Progressing    Description: Goals to be met by: 12/06/2024     Patient will increase functional independence with ADLs by performing:    UE Dressing with Supervision.  LE Dressing with Supervision.  Grooming while standing at sink with Supervision.  Toileting from bedside commode with Supervision for hygiene and clothing management.   Toilet transfer to bedside commode with Supervision.                         Time Tracking:     OT Date of Treatment: 11/08/24  OT Start Time: 1042  OT Stop Time: 1107  OT Total Time (min): 25 min    Billable Minutes:Self Care/Home Management 13  Therapeutic Activity 12    OT/IZZY: OT     Number of IZZY visits since last OT visit: 1    11/8/2024

## 2024-11-08 NOTE — PROGRESS NOTES
Pulmonary/Critical Care Progress Note      PATIENT NAME: Alexa Oteor  MRN: [id number]  TODAY'S DATE: 2024  11:55 AM  ADMIT DATE: 2024  AGE: 84 y.o. 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    She reports progressive fatigue weakness prior to admission to the hospital.  She reports that she slid out of her bed and that is the reason she came to the hospital.  She reports that she has not had any fever cough night sweats, but she has some worsening shortness of breath.    11/3   - rough night, had worsening shortness of breath and is required non-rebreather in addition no nasal cannula currently she is on high-flow at 10 liters/minute     the patient is feeling a little better today.  She is on 15 L high-flow nasal cannula.  Her CT shows her chronically infiltrated right lung with the surrounding effusion.  Her left lung which was normal looking 2 weeks ago now is diffusely infiltrated with ground-glass opacities.  This must be the Alimta.     the patient is improving with the steroids.  She looks much brighter today.  She is feeling better.  She is on 10 L high-flow nasal cannula.     the patient is very sleepy today.  The nurse reports she was up earlier and ate breakfast and sat up.  Her oxygen is set on 10 L.  Her sats remain 99% on 5 L. I have asked the nurse to try to wean this.     the patient is looking good today.  She is down to 3 L nasal cannula.  Her chest x-ray is improving but her left thorax is not yet normal. We need to get her priest out and her walking.     the patient is too weak  to participate with PT she says.  She also states that it makes her very short of breath.  Her saturations are difficult to .  Urology would like to leave the Rausch in.  Obviously, the patient is going to need to go to a nursing home from here.  She states she will be here over the weekend.    Review of Systems   Constitutional:  Negative for chills, fever and unexpected weight change.   HENT:  Negative for nosebleeds, postnasal drip, trouble swallowing and voice change.    Eyes:  Negative for visual disturbance.   Respiratory:  Negative for cough and wheezing.    Cardiovascular:  Negative for chest pain and leg swelling.   Gastrointestinal:  Positive for constipation. Negative for diarrhea, nausea and vomiting.   Endocrine: Negative for cold intolerance and heat intolerance.   Genitourinary:  Negative for dysuria, flank pain and frequency (.jppe).   Musculoskeletal:  Negative for neck pain and neck stiffness.   Skin:  Positive for wound.   Allergic/Immunologic: Negative for environmental allergies and immunocompromised state.   Neurological:  Negative for syncope, speech difficulty and weakness.   Hematological:  Negative for adenopathy.   Psychiatric/Behavioral:  Negative for confusion.          No change in the patient's Past Medical History, Past Surgical History, Social History or Family History since admission.    VITAL SIGNS (MOST RECENT)  Temp: 97.4 °F (36.3 °C) (11/08/24 0810)  Pulse: 74 (11/08/24 0644)  Resp: 18 (11/08/24 0644)  BP: (!) 122/52 (11/08/24 0810)  SpO2: 100 % (11/08/24 0644)    INTAKE AND OUTPUT (LAST 24 HOURS):  Intake/Output Summary (Last 24 hours) at 11/8/2024 1125  Last data filed at 11/8/2024 1059  Gross per 24 hour   Intake 1240 ml   Output 3945 ml   Net -2705 ml       WEIGHT  Wt Readings from Last 1 Encounters:   11/01/24 49.5 kg (109 lb 2 oz)         PHYSICAL EXAM  GENERAL: Older patient in no distress, looking frail.  HEENT: Pupils equal and reactive. Extraocular movements  intact. Nose intact.  Pharynx moist.  NECK: Supple.   HEART: Regular rate and rhythm. No murmur or gallop auscultated.  LUNGS:  There are diminished breath sounds on the right.  On the left there are diffuse crackles.  Lung excursion symmetrical. No change in fremitus.  There is a port in thorax which is accessed.  ABDOMEN: Bowel sounds present. Non-tender, no masses palpated.  : Normal anatomy. Rausch with yellow urine.  EXTREMITIES: Normal muscle tone and joint movement, no cyanosis or clubbing.   LYMPHATICS: No adenopathy palpated, no edema.  SKIN: Dry, intact, no lesions.  There is a stage I decubitus on the buttock.    NEURO: Cranial nerves II-XII intact. Motor strength 5/5 bilaterally, upper and lower extremities.  PSYCH: Appropriate affect.        CBC LAST (LAST 24 HOURS)  Recent Labs   Lab 11/08/24  0441   WBC 5.34   RBC 2.77*   HGB 9.0*   HCT 27.9*   *   MCH 32.5*   MCHC 32.3   RDW 17.6*   PLT 85*   MPV 10.4   GRAN 65.5  3.5   LYMPH 10.7*  0.6*   MONO 22.5*  1.2*   BASO 0.00   NRBC 0       CHEMISTRY LAST (LAST 24 HOURS)  Recent Labs   Lab 11/08/24  0441      K 4.7   CL 96   CO2 36*   ANIONGAP 4*   BUN 46*   CREATININE 1.4   *   CALCIUM 8.4*   MG 2.0   ALBUMIN 2.5*   PROT 5.0*   ALKPHOS 42*   ALT 18   AST 13   BILITOT 0.4           CARDIAC PROFILE (LAST 24 HOURS)  Recent Labs   Lab 11/02/24  1335   *       LAST 7 DAYS MICROBIOLOGY   Microbiology Results (last 7 days)       Procedure Component Value Units Date/Time    Blood culture x two cultures. Draw prior to antibiotics. [[id number]] Collected: 11/01/24 1045    Order Status: Completed Specimen: Blood from Peripheral, Antecubital, Left Updated: 11/06/24 1032     Blood Culture, Routine No growth after 5 days.    Narrative:      Aerobic and anaerobic    Blood culture x two cultures. Draw prior to antibiotics. [[id number]] Collected: 11/01/24 1051    Order Status: Completed Specimen: Blood from Peripheral, Antecubital, Right  Updated: 11/06/24 1032     Blood Culture, Routine No growth after 5 days.    Narrative:      Aerobic and anaerobic    MRSA Screen by PCR [[id number]] Collected: 11/02/24 1329    Order Status: Completed Specimen: Nasal Swab Updated: 11/02/24 1532     MRSA SCREEN BY PCR Negative    Respiratory Infection Panel (PCR), Nasopharyngeal [[id number]] Collected: 11/02/24 1329    Order Status: Completed Specimen: Nasopharyngeal Swab Updated: 11/02/24 1509     Respiratory Infection Panel Source NP swab     Adenovirus Not Detected     Coronavirus 229E, Common Cold Virus Not Detected     Coronavirus HKU1, Common Cold Virus Not Detected     Coronavirus NL63, Common Cold Virus Not Detected     Coronavirus OC43, Common Cold Virus Not Detected     Comment: Coronavirus strains 229E, HKU1, NL63, and OC43 can cause the common   cold   and are not associated with the respiratory disease outbreak caused   by  the COVID-19 (SARS-CoV-2 novel Coronavirus) strain.           SARS-CoV2 (COVID-19) Qualitative PCR Not Detected     Human Metapneumovirus Not Detected     Human Rhinovirus/Enterovirus Not Detected     Influenza A (subtypes H1, H1-2009,H3) Not Detected     Influenza B Not Detected     Parainfluenza Virus 1 Not Detected     Parainfluenza Virus 2 Not Detected     Parainfluenza Virus 3 Not Detected     Parainfluenza Virus 4 Not Detected     Respiratory Syncytial Virus Not Detected     Bordetella Parapertussis (EP7221) Not Detected     Bordetella pertussis (ptxP) Not Detected     Chlamydia pneumoniae Not Detected     Mycoplasma pneumoniae Not Detected     Comment: Respiratory Infection Panel testing performed by Multiplex PCR.       Narrative:      Respiratory Infection Panel source->NP Swab    Culture, Respiratory with Gram Stain [[id number]]     Order Status: Sent Specimen: Respiratory from Sputum, Expectorated             MOST RECENT IMAGING  X-Ray Chest AP Portable  Narrative: EXAMINATION:  XR CHEST AP PORTABLE    CLINICAL  HISTORY:  Pneumonitis and bronchogenic carcinoma with a fusion;    FINDINGS:  Portable chest radiograph at 06:09 hours compared to prior exams shows right subclavian injection port type central venous catheter, unchanged in position.  The cardiomediastinal silhouette and pulmonary vasculature are stable.    Volume loss in the right hemithorax is unchanged, with stable bilateral interstitial and airspace opacities, and bilateral pleural effusions.  No new pleural or parenchymal abnormality.  Impression: No significant interval change.    Electronically signed by: Davide Jeffrey  Date:    11/07/2024  Time:    07:17      CURRENT VISIT EKG  Results for orders placed or performed during the hospital encounter of 11/01/24   EKG 12-lead   Result Value Ref Range    QRS Duration 56 ms    [facility] QTC Calculation 482 ms    Narrative    Test Reason : R06.02,    Vent. Rate : 097 BPM     Atrial Rate : 097 BPM     P-R Int : 156 ms          QRS Dur : 056 ms      QT Int : 380 ms       P-R-T Axes : 044 -06 100 degrees     QTc Int : 482 ms    Normal sinus rhythm  Low voltage QRS  Septal infarct (cited on or before 13-OCT-2023)  Abnormal ECG  When compared with ECG of 25-OCT-2024 10:48,  ST now depressed in Lateral leads    Referred By: [email or web address]   SELF           Confirmed By:        ECHOCARDIOGRAM RESULTS  Results for orders placed during the hospital encounter of 10/07/24    Echo    Interpretation Summary    Left Ventricle: The left ventricle is normal in size. Mildly increased wall thickness. There is mild concentric hypertrophy. Moderate global hypokinesis present. There is mildly reduced systolic function with a visually estimated ejection fraction of 40 - 45%. There is normal diastolic function.    Right Ventricle: Normal right ventricular cavity size. Wall thickness is normal. Systolic function is normal.    Left Atrium: Left atrium is moderately dilated.    Mitral Valve: There is bileaflet sclerosis. There is moderate mitral annular  calcification present. There is moderate stenosis. The mean pressure gradient across the mitral valve is 8 mmHg at a heart rate of  bpm. There is mild regurgitation with a centrally directed jet.    Pulmonary Artery: The estimated pulmonary artery systolic pressure is 29 mmHg.    IVC/SVC: Normal venous pressure at 3 mmHg.        Oxygen INFORMATION       3 L           PLAN:.      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

## 2024-11-08 NOTE — PLAN OF CARE
Problem: Occupational Therapy  Goal: Occupational Therapy Goal  Description: Goals to be met by: 12/06/2024     Patient will increase functional independence with ADLs by performing:    UE Dressing with Supervision.  LE Dressing with Supervision.  Grooming while standing at sink with Supervision.  Toileting from bedside commode with Supervision for hygiene and clothing management. -MaxA  Toilet transfer to bedside commode with Supervision.- ModA  Outcome: Progressing; pt with severe forward flexion this session during scooting attempts and BSC transfer attempt requiring shipman and frequent verbal cueing to increase awareness and gain safe cooperation with the transfer.

## 2024-11-08 NOTE — ASSESSMENT & PLAN NOTE
Patient was started on cefepime, doxy for atypicals, and Vanc, vanc d/c'ed with negative MRSA screen  Pulmonary and Infectious Disease following  Antibiotic therapy complete    Antibiotics (From admission, onward)      Start     Stop Route Frequency Ordered    11/01/24 1300  mupirocin 2 % ointment         11/06/24 0859 Nasl 2 times daily 11/01/24 1200            Microbiology Results (last 7 days)       Procedure Component Value Units Date/Time    Blood culture x two cultures. Draw prior to antibiotics. [[id number]] Collected: 11/01/24 1045    Order Status: Completed Specimen: Blood from Peripheral, Antecubital, Left Updated: 11/06/24 1032     Blood Culture, Routine No growth after 5 days.    Narrative:      Aerobic and anaerobic    Blood culture x two cultures. Draw prior to antibiotics. [[id number]] Collected: 11/01/24 1051    Order Status: Completed Specimen: Blood from Peripheral, Antecubital, Right Updated: 11/06/24 1032     Blood Culture, Routine No growth after 5 days.    Narrative:      Aerobic and anaerobic    MRSA Screen by PCR [[id number]] Collected: 11/02/24 1329    Order Status: Completed Specimen: Nasal Swab Updated: 11/02/24 1532     MRSA SCREEN BY PCR Negative    Respiratory Infection Panel (PCR), Nasopharyngeal [[id number]] Collected: 11/02/24 1329    Order Status: Completed Specimen: Nasopharyngeal Swab Updated: 11/02/24 1509     Respiratory Infection Panel Source NP swab     Adenovirus Not Detected     Coronavirus 229E, Common Cold Virus Not Detected     Coronavirus HKU1, Common Cold Virus Not Detected     Coronavirus NL63, Common Cold Virus Not Detected     Coronavirus OC43, Common Cold Virus Not Detected     Comment: Coronavirus strains 229E, HKU1, NL63, and OC43 can cause the common   cold   and are not associated with the respiratory disease outbreak caused   by  the COVID-19 (SARS-CoV-2 novel Coronavirus) strain.           SARS-CoV2 (COVID-19) Qualitative PCR Not Detected     Human  Metapneumovirus Not Detected     Human Rhinovirus/Enterovirus Not Detected     Influenza A (subtypes H1, H1-2009,H3) Not Detected     Influenza B Not Detected     Parainfluenza Virus 1 Not Detected     Parainfluenza Virus 2 Not Detected     Parainfluenza Virus 3 Not Detected     Parainfluenza Virus 4 Not Detected     Respiratory Syncytial Virus Not Detected     Bordetella Parapertussis ([id number]) Not Detected     Bordetella pertussis (ptxP) Not Detected     Chlamydia pneumoniae Not Detected     Mycoplasma pneumoniae Not Detected     Comment: Respiratory Infection Panel testing performed by Multiplex PCR.       Narrative:      Respiratory Infection Panel source->NP Swab    Culture, Respiratory with Gram Stain [[id number]]     Order Status: Sent Specimen: Respiratory from Sputum, Expectorated

## 2024-11-08 NOTE — CARE UPDATE
11/07/24 2038   Patient Assessment/Suction   Level of Consciousness (AVPU) alert   Respiratory Effort Normal;Unlabored   Expansion/Accessory Muscles/Retractions no use of accessory muscles   All Lung Fields Breath Sounds diminished;clear   Rhythm/Pattern, Respiratory unlabored;pattern regular   Cough Frequency no cough   PRE-TX-O2   Device (Oxygen Therapy) high flow nasal cannula   $ Is the patient on Low Flow Oxygen? Yes   Flow (L/min) (Oxygen Therapy) 5   SpO2 97 %   Pulse Oximetry Type Intermittent   $ Pulse Oximetry - Multiple Charge Pulse Oximetry - Multiple   Pulse 78   Resp 18   Positioning Supine   Positioning   Head of Bed (HOB) Positioning HOB at 20 degrees   Positioning/Transfer Devices pillows;removed   Aerosol Therapy   $ Aerosol Therapy Charges Aerosol Treatment   Daily Review of Necessity (SVN) completed   Respiratory Treatment Status (SVN) given   Treatment Route (SVN) mask;oxygen   Patient Position HOB elevated   Post Treatment Assessment (SVN) patient reports breathing improved   Signs of Intolerance (SVN) none   Preset CPAP/BiPAP Settings   Mode Of Delivery BiPAP;Standby   CPAP/BIPAP charged w/in last 24 h YES   $ Is patient using? No/refused   Reason patient is not wearing? Patient refused   Who was contacted if refused? Joycelyn Hale, RN   Equipment Type V60   Education   $ Education BiPAP;Bronchodilator;Oxygen;30 min

## 2024-11-08 NOTE — CONSULTS
Bilateral buttock red, slow to davidson.  Cleaned and dried and applied Triad and Mepilex. Nurse will place waffle.  Patient is positioned to the right with pillows and heels are elevated. Skin assessment complete.

## 2024-11-08 NOTE — PT/OT/SLP PROGRESS
Physical Therapy Treatment    Patient Name:  Alexa Otero   MRN:  [id number]    Recommendations:     Discharge Recommendations: Moderate Intensity Therapy  Discharge Equipment Recommendations: none  Barriers to discharge: None    Assessment:     Alexa Otero is a 84 y.o. female admitted with a medical diagnosis of Acute on chronic hypoxic respiratory failure.  She presents with the following impairments/functional limitations: weakness, impaired endurance, impaired self care skills, impaired functional mobility, gait instability, impaired balance, impaired cardiopulmonary response to activity Pt found awake in bed visiting with daughter. Currently on 6 l NC.Pt requested to use BSC prior to gait. Performed SPT to BSC with RW and min A. After BM, pt stood with RW min A x 3 trials with max standing time of 45 seconds for cleaning. She required two seated rests to complete toileting needs..Increasingly flexed as she fatigues. Plan was up to BS chair however,pt reports too fatigued to sit up and requested BTB. Portable pulse oximeter not able to tract SPO2 this am.Motivated to improve.    Rehab Prognosis: Fair; patient would benefit from acute skilled PT services to address these deficits and reach maximum level of function.    Recent Surgery: * No surgery found *      Plan:     During this hospitalization, patient to be seen 5 x/week to address the identified rehab impairments via gait training, therapeutic activities, therapeutic exercises and progress toward the following goals:    Plan of Care Expires:  12/02/24    Subjective     Chief Complaint: weakness  Patient/Family Comments/goals: stronger  Pain/Comfort:  Pain Rating 1: 0/10  Pain Rating Post-Intervention 1: 0/10      Objective:     Communicated with nurse prior to session.  Patient found HOB elevated with   upon PT entry to room.     General Precautions: Standard, fall  Orthopedic Precautions: N/A  Braces: N/A  Respiratory Status: Nasal cannula, flow 6  L/min     Functional Mobility:  Bed Mobility:     Scooting: moderate assistance and of 2 persons  Supine to Sit: contact guard assistance  Sit to Supine: moderate assistance  Transfers:     Sit to Stand:  minimum assistance with rolling walker  Toilet Transfer: minimum assistance with  rolling walker  using  Stand Pivot  Balance: fair      AM-PAC 6 CLICK MOBILITY  Turning over in bed (including adjusting bedclothes, sheets and blankets)?: 3  Sitting down on and standing up from a chair with arms (e.g., wheelchair, bedside commode, etc.): 3  Moving from lying on back to sitting on the side of the bed?: 3  Moving to and from a bed to a chair (including a wheelchair)?: 3  Need to walk in hospital room?: 2  Climbing 3-5 steps with a railing?: 1  Basic Mobility Total Score: 15       Treatment & Education:  Pt educated on safety with RW for SPT transfers, benefits of increased time/activity OOB, PLB/ use of supplemental O2, pacing self, fall prevention, use of call light    Patient left HOB elevated with all lines intact, call button in reach, bed alarm on, and daughter present..    GOALS:   Multidisciplinary Problems       Physical Therapy Goals          Problem: Physical Therapy    Goal Priority Disciplines Outcome Interventions   Physical Therapy Goal     PT, PT/OT Progressing    Description: Goals to be met by: 24     Patient will increase functional independence with mobility by performin. Supine to sit with Supervision  2. Sit to stand transfer with Supervision  3. Bed to chair transfer with Supervision using Rolling Walker  4. Gait  x 100 feet with Supervision using Rolling Walker.                              Time Tracking:     PT Received On: 24  PT Start Time: 906     PT Stop Time: 929  PT Total Time (min): 23 min     Billable Minutes: Therapeutic Activity 23    Treatment Type: Treatment  PT/PTA: PT     Number of PTA visits since last PT visit: 3     2024

## 2024-11-09 NOTE — ASSESSMENT & PLAN NOTE
Likely secondary to her bilateral extensive pneumonia vs pneumonitis  On 4L NC at home  Currently on high flow nasal cannula and intermittent bipap  Pulm consulted   CTA chest ruled out PE.  Echo done last month showed normal systolic function.   Pneumonia vs. Pneumonitis? - pulm added prednisone initially and escalated to IV solumedrol for possible Pemetrexed induced pneumonitis   IV lasix on 11/3  Currently on 4 L  11/7 pulmonale switch patient to p.o. prednisone.  And recommended a taper over several weeks  Repeat chest x-ray in 2 weeks

## 2024-11-09 NOTE — PLAN OF CARE
Problem: Adult Inpatient Plan of Care  Goal: Plan of Care Review  Outcome: Progressing  Goal: Patient-Specific Goal (Individualized)  Outcome: Progressing  Goal: Absence of Hospital-Acquired Illness or Injury  Outcome: Progressing  Goal: Optimal Comfort and Wellbeing  Outcome: Progressing  Goal: Readiness for Transition of Care  Outcome: Progressing     Problem: Sepsis/Septic Shock  Goal: Optimal Coping  Outcome: Progressing  Goal: Absence of Bleeding  Outcome: Progressing  Goal: Blood Glucose Level Within Targeted Range  Outcome: Progressing  Goal: Absence of Infection Signs and Symptoms  Outcome: Progressing  Goal: Optimal Nutrition Intake  Outcome: Progressing     Problem: Pneumonia  Goal: Fluid Balance  Outcome: Progressing  Goal: Resolution of Infection Signs and Symptoms  Outcome: Progressing  Goal: Effective Oxygenation and Ventilation  Outcome: Progressing     Problem: Fall Injury Risk  Goal: Absence of Fall and Fall-Related Injury  Outcome: Progressing     Problem: Skin Injury Risk Increased  Goal: Skin Health and Integrity  Outcome: Progressing     Problem: Gas Exchange Impaired  Goal: Optimal Gas Exchange  Outcome: Progressing     Problem: Wound  Goal: Optimal Coping  Outcome: Progressing  Goal: Optimal Functional Ability  Outcome: Progressing  Goal: Absence of Infection Signs and Symptoms  Outcome: Progressing  Goal: Improved Oral Intake  Outcome: Progressing  Goal: Optimal Pain Control and Function  Outcome: Progressing  Goal: Skin Health and Integrity  Outcome: Progressing  Goal: Optimal Wound Healing  Outcome: Progressing

## 2024-11-09 NOTE — ASSESSMENT & PLAN NOTE
Pneumonitis:  Patient on 4L of O2.  Doing ok.  Awaiting SNF placement.     Lung cancer:  At this point all treatment is on hold.  Will see how she does on this admission prior to making any further decisions on treatment.      Anemia:  Hb is 8.4g/dl.  will continue to follow.

## 2024-11-09 NOTE — PT/OT/SLP PROGRESS
"Occupational Therapy   Treatment    Name: Alexa Otero  MRN: [id number]  Admitting Diagnosis:  Acute on chronic hypoxic respiratory failure       Recommendations:     Discharge Recommendations: Moderate Intensity Therapy  Discharge Equipment Recommendations:  to be determined by next level of care  Barriers to discharge:  Decreased caregiver support (increased assistance with ADLs, fall risk)    Assessment:     Alexa Otero is a 84 y.o. female with a medical diagnosis of Acute on chronic hypoxic respiratory failure. Performance deficits affecting function are weakness, impaired endurance, impaired self care skills, impaired functional mobility, gait instability, impaired balance, decreased safety awareness, decreased lower extremity function, decreased upper extremity function, impaired cardiopulmonary response to activity.     Rehab Prognosis:  Good; patient would benefit from acute skilled OT services to address these deficits and reach maximum level of function.       Plan:     Patient to be seen 6 x/week to address the above listed problems via self-care/home management, therapeutic activities, therapeutic exercises  Plan of Care Expires: 12/06/24  Plan of Care Reviewed with: patient, spouse    Subjective     Chief Complaint: "I am easily winded today."  Patient/Family Comments/goals: She was cooperative but weak and unable to tolerate much today.  Pain/Comfort:  Pain Rating 1: 0/10    Objective:     Communicated with: nurse prior to session.  Patient found supine with telemetry, oxygen, peripheral IV, priest, bed alarm upon OT entry to room.    General Precautions: Standard, fall    Orthopedic Precautions:N/A  Braces: N/A  Respiratory Status: Nasal cannula, flow 6 L/min     Occupational Performance:     Bed Mobility:    Patient completed Rolling/Turning to Right with supervision  Patient completed Supine to Sit with modified independence  Patient completed Sit to Supine with minimum assistance for lower " extremity management    Activities of Daily Living:  Grooming: set up assistance for oral care, face and ear washing seated EOB.     Used pursed lip breathing techniques for energy conservation.  She sat EOB with Min A to maintain erect sitting balance during self care task.    Treatment & Education:  She was educated on Role of Occupational Therapy, goals and plan of care.  Discussed importance of OOB activity and functional mobility to negate the negative effects of prolonged bedrest during this hospitalization, safe transfers and d/c planning recommendations. She sat EOB and performed g/h with pursed lip breathing for energy conservation.    Patient left supine with all lines intact, call button in reach, bed alarm on, and daughter present    GOALS:   Multidisciplinary Problems       Occupational Therapy Goals          Problem: Occupational Therapy    Goal Priority Disciplines Outcome Interventions   Occupational Therapy Goal     OT, PT/OT Progressing    Description: Goals to be met by: 12/06/2024     Patient will increase functional independence with ADLs by performing:    UE Dressing with Supervision.  LE Dressing with Supervision.  Grooming while standing at sink with Supervision.  Toileting from bedside commode with Supervision for hygiene and clothing management.   Toilet transfer to bedside commode with Supervision.                         Time Tracking:     OT Date of Treatment: 11/09/24  OT Start Time: 1457  OT Stop Time: 1511  OT Total Time (min): 14 min    Billable Minutes:Self Care/Home Management 14    OT/IZZY: OT     Number of IZZY visits since last OT visit: 1 11/9/2024

## 2024-11-09 NOTE — CARE UPDATE
11/09/24 0703   Patient Assessment/Suction   Level of Consciousness (AVPU) alert   Respiratory Effort Unlabored   Expansion/Accessory Muscles/Retractions no use of accessory muscles   All Lung Fields Breath Sounds Anterior:;Lateral:;diminished   Rhythm/Pattern, Respiratory unlabored;pattern regular;depth regular   Skin Integrity   $ Wound Care Tech Time 15 min   Area Observed Left;Right;Behind ear;Cheek   Skin Appearance without discoloration   PRE-TX-O2   Device (Oxygen Therapy) high flow nasal cannula   $ Is the patient on Low Flow Oxygen? Yes   Flow (L/min) (Oxygen Therapy) 4   SpO2 97 %   Pulse Oximetry Type Intermittent   $ Pulse Oximetry - Multiple Charge Pulse Oximetry - Multiple   Pulse 75   Resp 18   Positioning   Body Position sitting up in bed   Head of Bed (HOB) Positioning HOB lowered   Aerosol Therapy   $ Aerosol Therapy Charges Aerosol Treatment   Daily Review of Necessity (SVN) completed   Respiratory Treatment Status (SVN) given   Treatment Route (SVN) mask   Patient Position HOB elevated   Post Treatment Assessment (SVN) breath sounds unchanged   Signs of Intolerance (SVN) none   Breath Sounds Post-Respiratory Treatment   Throughout All Fields Post-Treatment All Fields   Throughout All Fields Post-Treatment Anterior:;Posterior:;Lateral:   Post-treatment Heart Rate (beats/min) 80   Post-treatment Resp Rate (breaths/min) 16   Incentive Spirometer   $ Incentive Spirometer Charges done with encouragement   Incentive Spirometer Predicted Level (mL) 1700   Administration (IS) proper technique demonstrated   Number of Repetitions (IS) 10   Level Incentive Spirometer (mL) 750   Patient Tolerance (IS) fair   Respiratory Interventions   Cough And Deep Breathing done with encouragement   Preset CPAP/BiPAP Settings   Mode Of Delivery BiPAP;Standby   $ CPAP/BiPAP Daily Charge 1   CPAP/BIPAP charged w/in last 24 h YES

## 2024-11-09 NOTE — PROGRESS NOTES
Pulmonary/Critical Care Progress Note      PATIENT NAME: Alexa Otero  MRN: [id number]  TODAY'S DATE: 2024  11:55 AM  ADMIT DATE: 2024  AGE: 84 y.o. 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    She reports progressive fatigue weakness prior to admission to the hospital.  She reports that she slid out of her bed and that is the reason she came to the hospital.  She reports that she has not had any fever cough night sweats, but she has some worsening shortness of breath.    11/3   - rough night, had worsening shortness of breath and is required non-rebreather in addition no nasal cannula currently she is on high-flow at 10 liters/minute     the patient is feeling a little better today.  She is on 15 L high-flow nasal cannula.  Her CT shows her chronically infiltrated right lung with the surrounding effusion.  Her left lung which was normal looking 2 weeks ago now is diffusely infiltrated with ground-glass opacities.  This must be the Alimta.     the patient is improving with the steroids.  She looks much brighter today.  She is feeling better.  She is on 10 L high-flow nasal cannula.     the patient is very sleepy today.  The nurse reports she was up earlier and ate breakfast and sat up.  Her oxygen is set on 10 L.  Her sats remain 99% on 5 L. I have asked the nurse to try to wean this.     the patient is looking good today.  She is down to 3 L nasal cannula.  Her chest x-ray is improving but her left thorax is not yet normal. We need to get her priest out and her walking.     the patient is too  weak to participate with PT she says.  She also states that it makes her very short of breath.  Her saturations are difficult to .  Urology would like to leave the Rausch in.  Obviously, the patient is going to need to go to a nursing home from here.  She states she will be here over the weekend.    11/9 the patient states physical therapy has not yet come to see her today.  States she is feeling good from the waist up.  She is on 4 L nasal cannula.    Review of Systems   Constitutional:  Negative for chills, fever and unexpected weight change.   HENT:  Negative for nosebleeds, postnasal drip, trouble swallowing and voice change.    Eyes:  Negative for visual disturbance.   Respiratory:  Negative for cough and wheezing.    Cardiovascular:  Negative for chest pain and leg swelling.   Gastrointestinal:  Positive for constipation. Negative for diarrhea, nausea and vomiting.   Endocrine: Negative for cold intolerance and heat intolerance.   Genitourinary:  Negative for dysuria, flank pain and frequency (.jppe).   Musculoskeletal:  Negative for neck pain and neck stiffness.   Skin:  Positive for wound.   Allergic/Immunologic: Negative for environmental allergies and immunocompromised state.   Neurological:  Negative for syncope, speech difficulty and weakness.   Hematological:  Negative for adenopathy.   Psychiatric/Behavioral:  Negative for confusion.          No change in the patient's Past Medical History, Past Surgical History, Social History or Family History since admission.    VITAL SIGNS (MOST RECENT)  Temp: 98.5 °F (36.9 °C) (11/09/24 1130)  Pulse: 78 (11/09/24 1130)  Resp: 16 (11/09/24 1130)  BP: 124/64 (11/09/24 1130)  SpO2: 96 % (11/09/24 1130)    INTAKE AND OUTPUT (LAST 24 HOURS):  Intake/Output Summary (Last 24 hours) at 11/9/2024 1255  Last data filed at 11/9/2024 1252  Gross per 24 hour   Intake 1100 ml   Output 2950 ml   Net -1850 ml       WEIGHT  Wt Readings from Last 1 Encounters:   11/01/24 49.5  "kg (109 lb 2 oz)         PHYSICAL EXAM  GENERAL: Older patient in no distress, looking frail.  HEENT: Pupils equal and reactive. Extraocular movements intact. Nose intact.  Pharynx moist.  NECK: Supple.   HEART: Regular rate and rhythm. No murmur or gallop auscultated.  LUNGS:  There are diminished breath sounds on the right.  On the left there are diffuse crackles.  Lung excursion symmetrical. No change in fremitus.  There is a port in thorax which is accessed.  ABDOMEN: Bowel sounds present. Non-tender, no masses palpated.  : Normal anatomy. Rausch with clear urine.  EXTREMITIES: Normal muscle tone and joint movement, no cyanosis or clubbing.   LYMPHATICS: No adenopathy palpated, no edema.  SKIN: Dry, intact, no lesions.  There is a stage I decubitus on the buttock.    NEURO: Cranial nerves II-XII intact. Motor strength 5/5 bilaterally, upper and lower extremities.  PSYCH: Appropriate affect.        CBC LAST (LAST 24 HOURS)  Recent Labs   Lab 11/09/24  0631   WBC 7.43   RBC 2.60*   HGB 8.4*   HCT 26.7*   *   MCH 32.3*   MCHC 31.5*   RDW 17.7*   PLT 73*   MPV 10.4   GRAN 71.1  5.3   LYMPH 10.0*  0.7*   MONO 17.8*  1.3*   BASO 0.00   NRBC 0       CHEMISTRY LAST (LAST 24 HOURS)  Recent Labs   Lab 11/09/24  0631      K 4.6   CL 99   CO2 38*   ANIONGAP 1*   BUN 42*   CREATININE 1.3   *   CALCIUM 8.3*   MG 1.8   ALBUMIN 2.5*   PROT 4.9*   ALKPHOS 45*   ALT 20   AST 13   BILITOT 0.4           CARDIAC PROFILE (LAST 24 HOURS)  No results for input(s): "BNP", "CPK", "CPKMB", "LDH", "TROPONINI", "TROPONINIHS" in the last 168 hours.      LAST 7 DAYS MICROBIOLOGY   Microbiology Results (last 7 days)       Procedure Component Value Units Date/Time    Blood culture x two cultures. Draw prior to antibiotics. [[id number]] Collected: 11/01/24 1045    Order Status: Completed Specimen: Blood from Peripheral, Antecubital, Left Updated: 11/06/24 1032     Blood Culture, Routine No growth after 5 days.    " Narrative:      Aerobic and anaerobic    Blood culture x two cultures. Draw prior to antibiotics. [[id number]] Collected: 11/01/24 1051    Order Status: Completed Specimen: Blood from Peripheral, Antecubital, Right Updated: 11/06/24 1032     Blood Culture, Routine No growth after 5 days.    Narrative:      Aerobic and anaerobic    MRSA Screen by PCR [[id number]] Collected: 11/02/24 1329    Order Status: Completed Specimen: Nasal Swab Updated: 11/02/24 1532     MRSA SCREEN BY PCR Negative    Respiratory Infection Panel (PCR), Nasopharyngeal [[id number]] Collected: 11/02/24 1329    Order Status: Completed Specimen: Nasopharyngeal Swab Updated: 11/02/24 1509     Respiratory Infection Panel Source NP swab     Adenovirus Not Detected     Coronavirus 229E, Common Cold Virus Not Detected     Coronavirus HKU1, Common Cold Virus Not Detected     Coronavirus NL63, Common Cold Virus Not Detected     Coronavirus OC43, Common Cold Virus Not Detected     Comment: Coronavirus strains 229E, HKU1, NL63, and OC43 can cause the common   cold   and are not associated with the respiratory disease outbreak caused   by  the COVID-19 (SARS-CoV-2 novel Coronavirus) strain.           SARS-CoV2 (COVID-19) Qualitative PCR Not Detected     Human Metapneumovirus Not Detected     Human Rhinovirus/Enterovirus Not Detected     Influenza A (subtypes H1, H1-2009,H3) Not Detected     Influenza B Not Detected     Parainfluenza Virus 1 Not Detected     Parainfluenza Virus 2 Not Detected     Parainfluenza Virus 3 Not Detected     Parainfluenza Virus 4 Not Detected     Respiratory Syncytial Virus Not Detected     Bordetella Parapertussis (RY7386) Not Detected     Bordetella pertussis (ptxP) Not Detected     Chlamydia pneumoniae Not Detected     Mycoplasma pneumoniae Not Detected     Comment: Respiratory Infection Panel testing performed by Multiplex PCR.       Narrative:      Respiratory Infection Panel source->NP Swab    Culture, Respiratory with Gram  Stain [[id number]]     Order Status: Sent Specimen: Respiratory from Sputum, Expectorated             MOST RECENT IMAGING  X-Ray Chest AP Portable  Narrative: EXAMINATION:  XR CHEST AP PORTABLE    CLINICAL HISTORY:  Pneumonitis and bronchogenic carcinoma with a fusion;    FINDINGS:  Portable chest radiograph at 06:09 hours compared to prior exams shows right subclavian injection port type central venous catheter, unchanged in position.  The cardiomediastinal silhouette and pulmonary vasculature are stable.    Volume loss in the right hemithorax is unchanged, with stable bilateral interstitial and airspace opacities, and bilateral pleural effusions.  No new pleural or parenchymal abnormality.  Impression: No significant interval change.    Electronically signed by: Davide Jeffrey  Date:    11/07/2024  Time:    07:17      CURRENT VISIT EKG  Results for orders placed or performed during the hospital encounter of 11/01/24   EKG 12-lead   Result Value Ref Range    QRS Duration 56 ms    [facility] QTC Calculation 482 ms    Narrative    Test Reason : R06.02,    Vent. Rate : 097 BPM     Atrial Rate : 097 BPM     P-R Int : 156 ms          QRS Dur : 056 ms      QT Int : 380 ms       P-R-T Axes : 044 -06 100 degrees     QTc Int : 482 ms    Normal sinus rhythm  Low voltage QRS  Septal infarct (cited on or before 13-OCT-2023)  Abnormal ECG  When compared with ECG of 25-OCT-2024 10:48,  ST now depressed in Lateral leads    Referred By: [email or web address]   SELF           Confirmed By:        ECHOCARDIOGRAM RESULTS  Results for orders placed during the hospital encounter of 10/07/24    Echo    Interpretation Summary    Left Ventricle: The left ventricle is normal in size. Mildly increased wall thickness. There is mild concentric hypertrophy. Moderate global hypokinesis present. There is mildly reduced systolic function with a visually estimated ejection fraction of 40 - 45%. There is normal diastolic function.    Right Ventricle: Normal right  ventricular cavity size. Wall thickness is normal. Systolic function is normal.    Left Atrium: Left atrium is moderately dilated.    Mitral Valve: There is bileaflet sclerosis. There is moderate mitral annular calcification present. There is moderate stenosis. The mean pressure gradient across the mitral valve is 8 mmHg at a heart rate of  bpm. There is mild regurgitation with a centrally directed jet.    Pulmonary Artery: The estimated pulmonary artery systolic pressure is 29 mmHg.    IVC/SVC: Normal venous pressure at 3 mmHg.        Oxygen INFORMATION       4 L           PLAN:.      Pneumonitis involving the left lung  - patient has no signs or symptoms of pneumonia  - this was not there 2 weeks ago making progression of her cancer unlikely  - most likely the Alimta  - do not feel a bronchoscopy which would require intubation for this patient is necessary at this time  - Continue duo nebs   - Continue prednisone  - Continue her oxygen she is at 4 L now  - The patient is DNR/DNI which is appropriate  Lepidic adenocarcinoma  - involving the remainder of the right lung  - there appears to be endobronchial disease now as well on the right  Pleural effusion  - persisting and circumferential  Emphysema  - continuing bronchodilators  Acute on chronic hypoxemic respiratory failure  - on 4 L  Thrombocytopenia  - worsening  - heme Onc following  Moderate hypoalbuminemia  - encouraged patient to eat her protein    Urology says leave the Rausch in  Patient states she will try to work with PT today  Oxygenation stable on prednisone so far  The patient and  agreeable with a skilled nursing facility, [facility] is their choice      Neva Christian MD  Date of Service: 11/09/2024  11:55 AM

## 2024-11-09 NOTE — ASSESSMENT & PLAN NOTE
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 Most recent hemoglobin and hematocrit are listed below.  Recent Labs     11/07/24  0600 11/08/24  0441 11/09/24  0631   HGB 9.1* 9.0* 8.4*   HCT 28.5* 27.9* 26.7*       Plan  - Monitor serial CBC: Daily  - Transfuse PRBC if patient becomes hemodynamically unstable, symptomatic or H/H drops below 7/21.  - Patient has received 1 units of PRBCs on 11/4  - Patient's anemia is currently stable  - T&S in AM in the event she requires a transfusion

## 2024-11-09 NOTE — SUBJECTIVE & OBJECTIVE
Interval History:   Patient currently on 4 L via nasal cannula.  She endorses she feels better today than she did on yesterday.  Patient is willing to work with physical therapy on today.  Discharge disposition pending   Review of Systems   Constitutional:  Positive for fatigue. Negative for chills and fever.   Respiratory:  Positive for cough and shortness of breath (With Exertion).    Cardiovascular: Negative.    Gastrointestinal: Negative.    Genitourinary: Negative.    Neurological:  Positive for weakness.     Objective:     Vital Signs (Most Recent):  Temp: 98.5 °F (36.9 °C) (11/09/24 1130)  Pulse: 80 (11/09/24 1353)  Resp: 18 (11/09/24 1353)  BP: 124/64 (11/09/24 1130)  SpO2: 97 % (11/09/24 1353) Vital Signs (24h Range):  Temp:  [97.3 °F (36.3 °C)-98.5 °F (36.9 °C)] 98.5 °F (36.9 °C)  Pulse:  [68-83] 80  Resp:  [16-20] 18  SpO2:  [93 %-97 %] 97 %  BP: (124-153)/(55-66) 124/64     Weight: 49.5 kg (109 lb 2 oz)  Body mass index is 18.73 kg/m².    Intake/Output Summary (Last 24 hours) at 11/9/2024 1412  Last data filed at 11/9/2024 1252  Gross per 24 hour   Intake 1100 ml   Output 2950 ml   Net -1850 ml         Physical Exam  Vitals and nursing note reviewed. Exam conducted with a chaperone present.   Constitutional:       Comments: Chronically ill appearing   HENT:      Head: Normocephalic and atraumatic.      Nose: Nose normal.      Mouth/Throat:      Mouth: Mucous membranes are moist.      Pharynx: Oropharynx is clear.   Eyes:      Pupils: Pupils are equal, round, and reactive to light.   Cardiovascular:      Rate and Rhythm: Normal rate.   Pulmonary:      Effort: Pulmonary effort is normal.      Comments: Coarse and diminished throughout  Abdominal:      Comments: Abd is firm, mildly distended and nontender Bsx4 quadrants   Musculoskeletal:         General: Normal range of motion.      Cervical back: Normal range of motion and neck supple.   Skin:     General: Skin is warm.      Capillary Refill: Capillary  refill takes 2 to 3 seconds.      Findings: Bruising present.   Neurological:      General: No focal deficit present.      Mental Status: She is alert and oriented to person, place, and time.   Psychiatric:         Mood and Affect: Mood normal.         Behavior: Behavior normal.             Significant Labs: All pertinent labs within the past 24 hours have been reviewed.  CBC:   Recent Labs   Lab 11/08/24 0441 11/09/24  0631   WBC 5.34 7.43   HGB 9.0* 8.4*   HCT 27.9* 26.7*   PLT 85* 73*     CMP:   Recent Labs   Lab 11/08/24  0441 11/09/24  0631    138   K 4.7 4.6   CL 96 99   CO2 36* 38*   * 123*   BUN 46* 42*   CREATININE 1.4 1.3   CALCIUM 8.4* 8.3*   PROT 5.0* 4.9*   ALBUMIN 2.5* 2.5*   BILITOT 0.4 0.4   ALKPHOS 42* 45*   AST 13 13   ALT 18 20   ANIONGAP 4* 1*     Magnesium:   Recent Labs   Lab 11/08/24  0441 11/09/24  0631   MG 2.0 1.8     POCT Glucose:   Recent Labs   Lab 11/08/24  1212 11/08/24  1648 11/08/24  1929   POCTGLUCOSE 228* 281* 338*       Significant Imaging: I have reviewed all pertinent imaging results/findings within the past 24 hours.  Imaging Results              CTA Chest Non-Coronary (PE Studies) (Final result)  Result time 11/01/24 12:51:32      Final result by Davide Jeffrey MD (11/01/24 12:51:32)                   Impression:      1. Negative for pulmonary thromboembolism.  2. Extensive interstitial and airspace opacities throughout both lungs, having significantly worsened in the left lung compared to prior CT.  Multifocal pneumonia, drug reaction, and or developing ARDS could have this appearance.  3. Unchanged small to moderate sized bilateral pleural effusions.  4. Additional observations as described.  .      Electronically signed by: Davide Jeffrey  Date:    11/01/2024  Time:    12:51               Narrative:    EXAMINATION:  CTA CHEST NON CORONARY (PE STUDIES)    CLINICAL HISTORY:  Pulmonary embolism (PE) suspected, high prob; lung cancer    TECHNIQUE:  Thin axial  imaging through the chest was performed with 100 mL Omnipaque 350 IV contrast, with sagittal and coronal reformatted images and MIP reconstructions performed, with images stored in the patient's permanent electronic medical record.    FINDINGS:  Comparison to multiple prior exams.  There are no pulmonary arterial filling defects to suggest pulmonary thromboembolism.  The central pulmonary arteries are normal in caliber.    Diffuse calcified plaque involves normal-caliber thoracic aorta, with the heart enlarged and no pericardial effusion.  There are extensive coronary arterial calcifications, with dense mitral annular calcifications.  No enlarged mediastinal or hilar lymph nodes.    There is unchanged volume loss in the right hemithorax, with extensive interstitial and airspace opacities throughout both lungs, in the left lung having significantly worsened compared to the 10/07/2024.  There are scattered lucencies reflecting pulmonary emphysema.  Small to moderate sized low-density bilateral pleural effusions are unchanged, with the central airways patent.  No pneumothorax.    Images of the upper abdomen show stable hepatic hypodensity, cholecystectomy clips, and scattered colon diverticula.  There are no acute fractures or destructive osseous lesions.                                       X-Ray Chest AP Portable (Final result)  Result time 11/01/24 10:39:57      Final result by Kp Leiva DO (11/01/24 10:39:57)                   Impression:      No significant change of bilateral diffuse mixed interstitial and airspace lung opacities.      Electronically signed by: Kp Leiva  Date:    11/01/2024  Time:    10:39               Narrative:    EXAMINATION:  XR CHEST AP PORTABLE    CLINICAL HISTORY:  Sepsis;    FINDINGS:  Portable chest with comparison chest x-ray 10/10/2024.  Normal cardiomediastinal silhouette.Bilateral diffuse mixed interstitial and airspace lung opacities are unchanged from previous exam.   Right subclavian Port-A-Cath is unchanged.  Pulmonary vasculature is normal. No acute osseous abnormality.

## 2024-11-09 NOTE — CARE UPDATE
11/08/24 1933   Patient Assessment/Suction   Level of Consciousness (AVPU) alert   Respiratory Effort Unlabored   Expansion/Accessory Muscles/Retractions no retractions;no use of accessory muscles   All Lung Fields Breath Sounds Anterior:;Lateral:;diminished   Rhythm/Pattern, Respiratory pattern regular;depth regular   PRE-TX-O2   Device (Oxygen Therapy) high flow nasal cannula   Flow (L/min) (Oxygen Therapy) 5   SpO2 (!) 93 %   Pulse Oximetry Type Intermittent   $ Pulse Oximetry - Multiple Charge Pulse Oximetry - Multiple   Pulse 83   Resp 18   Aerosol Therapy   $ Aerosol Therapy Charges Aerosol Treatment   Daily Review of Necessity (SVN) completed   Respiratory Treatment Status (SVN) given   Treatment Route (SVN) mask;oxygen   Patient Position HOB elevated   Post Treatment Assessment (SVN) breath sounds unchanged   Signs of Intolerance (SVN) none   Breath Sounds Post-Respiratory Treatment   Throughout All Fields Post-Treatment All Fields   Throughout All Fields Post-Treatment no change   Post-treatment Heart Rate (beats/min) 85   Post-treatment Resp Rate (breaths/min) 18   Incentive Spirometer   $ Incentive Spirometer Charges done with encouragement   Incentive Spirometer Predicted Level (mL) 1700   Administration (IS) self-administered   Number of Repetitions (IS) 10   Level Incentive Spirometer (mL) 800   Patient Tolerance (IS) fair   Education   $ Education Bronchodilator;Incentive Spirometry;Oxygen;15 min

## 2024-11-09 NOTE — SUBJECTIVE & OBJECTIVE
Interval History:     Oncology Treatment Plan:   OP NSCLC PEMETREXED + CARBOPLATIN (AUC) Q3W    Medications:  Continuous Infusions:  Scheduled Meds:   albuterol-ipratropium  3 mL Nebulization Q6H WAKE    amiodarone  200 mg Oral BID    apixaban  2.5 mg Oral BID    electrolytes-dextrose  200 mL Oral Q4H    folic acid  1 mg Oral Daily    gabapentin  100 mg Oral BID    insulin aspart U-100  5 Units Subcutaneous TIDWM    magnesium oxide  400 mg Oral Daily    metoprolol succinate  25 mg Oral Daily    pantoprazole  40 mg Oral Daily    polyethylene glycol  17 g Oral Daily    [START ON 11/21/2024] predniSONE  30 mg Oral Daily    predniSONE  40 mg Oral Daily    sucralfate  1 g Oral QID    tamsulosin  0.4 mg Oral Daily     PRN Meds:  Current Facility-Administered Medications:     0.9%  NaCl infusion (for blood administration), , Intravenous, Q24H PRN    acetaminophen, 650 mg, Oral, Q8H PRN    acetaminophen, 650 mg, Oral, Q4H PRN    aluminum-magnesium hydroxide-simethicone, 30 mL, Oral, QID PRN    dextrose 50%, 12.5 g, Intravenous, PRN    dextrose 50%, 25 g, Intravenous, PRN    glucagon (human recombinant), 1 mg, Intramuscular, PRN    glucose, 16 g, Oral, PRN    glucose, 24 g, Oral, PRN    HYDROcodone-acetaminophen, 1 tablet, Oral, Q6H PRN    insulin aspart U-100, 0-10 Units, Subcutaneous, QID (AC + HS) PRN    lactulose, 20 g, Oral, Q6H PRN    LORazepam, 0.5 mg, Oral, Q8H PRN    magnesium oxide, 800 mg, Oral, PRN    magnesium oxide, 800 mg, Oral, PRN    melatonin, 6 mg, Oral, Nightly PRN    naloxone, 0.02 mg, Intravenous, PRN    ondansetron, 4 mg, Intravenous, Q6H PRN    potassium bicarbonate, 35 mEq, Oral, PRN    potassium bicarbonate, 50 mEq, Oral, PRN    potassium bicarbonate, 60 mEq, Oral, PRN    potassium, sodium phosphates, 2 packet, Oral, PRN    potassium, sodium phosphates, 2 packet, Oral, PRN    potassium, sodium phosphates, 2 packet, Oral, PRN    sodium chloride 0.9%, 2 mL, Intravenous, Q12H PRN       Objective:      Vital Signs (Most Recent):  Temp: 98.1 °F (36.7 °C) (11/09/24 0730)  Pulse: 68 (11/09/24 0856)  Resp: 16 (11/09/24 0730)  BP: (!) 144/61 (11/09/24 0730)  SpO2: 97 % (11/09/24 0703) Vital Signs (24h Range):  Temp:  [97.3 °F (36.3 °C)-98.2 °F (36.8 °C)] 98.1 °F (36.7 °C)  Pulse:  [68-85] 68  Resp:  [16-20] 16  SpO2:  [93 %-99 %] 97 %  BP: (117-153)/(55-66) 144/61     Weight: 49.5 kg (109 lb 2 oz)  Body mass index is 18.73 kg/m².  Body surface area is 1.5 meters squared.      Intake/Output Summary (Last 24 hours) at 11/9/2024 1007  Last data filed at 11/9/2024 0856  Gross per 24 hour   Intake 1000 ml   Output 2700 ml   Net -1700 ml        Physical Exam  Constitutional:       Appearance: Normal appearance.   HENT:      Head: Normocephalic and atraumatic.   Eyes:      General: No scleral icterus.     Conjunctiva/sclera: Conjunctivae normal.   Cardiovascular:      Rate and Rhythm: Normal rate.   Pulmonary:      Effort: Pulmonary effort is normal.   Abdominal:      General: Abdomen is flat.   Neurological:      General: No focal deficit present.      Mental Status: She is alert and oriented to person, place, and time.   Psychiatric:         Mood and Affect: Mood normal.         Behavior: Behavior normal.          Significant Labs:   CBC:   Recent Labs   Lab 11/08/24  0441 11/09/24 0631   WBC 5.34 7.43   HGB 9.0* 8.4*   HCT 27.9* 26.7*   PLT 85* 73*    and CMP:   Recent Labs   Lab 11/07/24  1321 11/08/24  0441 11/09/24  0631   * 136 138   K 4.9 4.7 4.6   CL 93* 96 99   CO2 30* 36* 38*   * 143* 123*   BUN 50* 46* 42*   CREATININE 1.4 1.4 1.3   CALCIUM 9.1 8.4* 8.3*   PROT  --  5.0* 4.9*   ALBUMIN  --  2.5* 2.5*   BILITOT  --  0.4 0.4   ALKPHOS  --  42* 45*   AST  --  13 13   ALT  --  18 20   ANIONGAP 12 4* 1*       Diagnostic Results:  None  Review of Systems

## 2024-11-09 NOTE — PROGRESS NOTES
[facility] hospital  Hematology/Oncology  Progress Note    Patient Name: Alexa Otero  Admission Date: 11/1/2024  Hospital Length of Stay: 8 days  Code Status: DNR     Subjective:     HPI:  Ms. Otero is doing ok currently.  On 4L O2.  No new complaints.     Interval History:     Oncology Treatment Plan:   OP NSCLC PEMETREXED + CARBOPLATIN (AUC) Q3W    Medications:  Continuous Infusions:  Scheduled Meds:   albuterol-ipratropium  3 mL Nebulization Q6H WAKE    amiodarone  200 mg Oral BID    apixaban  2.5 mg Oral BID    electrolytes-dextrose  200 mL Oral Q4H    folic acid  1 mg Oral Daily    gabapentin  100 mg Oral BID    insulin aspart U-100  5 Units Subcutaneous TIDWM    magnesium oxide  400 mg Oral Daily    metoprolol succinate  25 mg Oral Daily    pantoprazole  40 mg Oral Daily    polyethylene glycol  17 g Oral Daily    [START ON 11/21/2024] predniSONE  30 mg Oral Daily    predniSONE  40 mg Oral Daily    sucralfate  1 g Oral QID    tamsulosin  0.4 mg Oral Daily     PRN Meds:  Current Facility-Administered Medications:     0.9%  NaCl infusion (for blood administration), , Intravenous, Q24H PRN    acetaminophen, 650 mg, Oral, Q8H PRN    acetaminophen, 650 mg, Oral, Q4H PRN    aluminum-magnesium hydroxide-simethicone, 30 mL, Oral, QID PRN    dextrose 50%, 12.5 g, Intravenous, PRN    dextrose 50%, 25 g, Intravenous, PRN    glucagon (human recombinant), 1 mg, Intramuscular, PRN    glucose, 16 g, Oral, PRN    glucose, 24 g, Oral, PRN    HYDROcodone-acetaminophen, 1 tablet, Oral, Q6H PRN    insulin aspart U-100, 0-10 Units, Subcutaneous, QID (AC + HS) PRN    lactulose, 20 g, Oral, Q6H PRN    LORazepam, 0.5 mg, Oral, Q8H PRN    magnesium oxide, 800 mg, Oral, PRN    magnesium oxide, 800 mg, Oral, PRN    melatonin, 6 mg, Oral, Nightly PRN    naloxone, 0.02 mg, Intravenous, PRN    ondansetron, 4 mg, Intravenous, Q6H PRN    potassium bicarbonate, 35 mEq, Oral, PRN    potassium bicarbonate, 50 mEq, Oral, PRN     potassium bicarbonate, 60 mEq, Oral, PRN    potassium, sodium phosphates, 2 packet, Oral, PRN    potassium, sodium phosphates, 2 packet, Oral, PRN    potassium, sodium phosphates, 2 packet, Oral, PRN    sodium chloride 0.9%, 2 mL, Intravenous, Q12H PRN       Objective:     Vital Signs (Most Recent):  Temp: 98.1 °F (36.7 °C) (11/09/24 0730)  Pulse: 68 (11/09/24 0856)  Resp: 16 (11/09/24 0730)  BP: (!) 144/61 (11/09/24 0730)  SpO2: 97 % (11/09/24 0703) Vital Signs (24h Range):  Temp:  [97.3 °F (36.3 °C)-98.2 °F (36.8 °C)] 98.1 °F (36.7 °C)  Pulse:  [68-85] 68  Resp:  [16-20] 16  SpO2:  [93 %-99 %] 97 %  BP: (117-153)/(55-66) 144/61     Weight: 49.5 kg (109 lb 2 oz)  Body mass index is 18.73 kg/m².  Body surface area is 1.5 meters squared.      Intake/Output Summary (Last 24 hours) at 11/9/2024 1007  Last data filed at 11/9/2024 0856  Gross per 24 hour   Intake 1000 ml   Output 2700 ml   Net -1700 ml        Physical Exam  Constitutional:       Appearance: Normal appearance.   HENT:      Head: Normocephalic and atraumatic.   Eyes:      General: No scleral icterus.     Conjunctiva/sclera: Conjunctivae normal.   Cardiovascular:      Rate and Rhythm: Normal rate.   Pulmonary:      Effort: Pulmonary effort is normal.   Abdominal:      General: Abdomen is flat.   Neurological:      General: No focal deficit present.      Mental Status: She is alert and oriented to person, place, and time.   Psychiatric:         Mood and Affect: Mood normal.         Behavior: Behavior normal.          Significant Labs:   CBC:   Recent Labs   Lab 11/08/24  0441 11/09/24 0631   WBC 5.34 7.43   HGB 9.0* 8.4*   HCT 27.9* 26.7*   PLT 85* 73*    and CMP:   Recent Labs   Lab 11/07/24  1321 11/08/24  0441 11/09/24 0631   * 136 138   K 4.9 4.7 4.6   CL 93* 96 99   CO2 30* 36* 38*   * 143* 123*   BUN 50* 46* 42*   CREATININE 1.4 1.4 1.3   CALCIUM 9.1 8.4* 8.3*   PROT  --  5.0* 4.9*   ALBUMIN  --  2.5* 2.5*   BILITOT  --  0.4 0.4    ALKPHOS  --  42* 45*   AST  --  13 13   ALT  --  18 20   ANIONGAP 12 4* 1*       Diagnostic Results:  None  Review of Systems  Assessment/Plan:     Multifocal pneumonia vs. pneumonitis  Pneumonitis:  Patient on 4L of O2.  Doing ok.  Awaiting SNF placement.     Lung cancer:  At this point all treatment is on hold.  Will see how she does on this admission prior to making any further decisions on treatment.      Anemia:  Hb is 8.4g/dl.  will continue to follow.             Thank you for your consult. I will follow-up with patient. Please contact us if you have any additional questions.     Virgil Lloyd MD  Hematology/Oncology  [facility]

## 2024-11-09 NOTE — PROGRESS NOTES
[facility] Medicine  Progress Note    Patient Name: Alexa Otero  MRN: [id number]  Patient Class: IP- Inpatient   Admission Date: 11/1/2024  Length of Stay: 8 days  Attending Physician: Judy Birmingham MD  Primary Care Provider: Idalia Greco MD        Subjective:     Principal Problem:Acute on chronic hypoxic respiratory failure        HPI:  84-year-old female with a past medical history of lung cancer actively getting chemo last of which was 5 days ago, who presented to the ER because of generalized weakness.  According to the patient, she woke up today, tried to get out of bed, but felt very weak, and slid beside the bed.  Did not hit her head, and did not lose consciousness.  She however could not get up however, and her  could not assist.  911 was called.  On arrival of EMS, patient was satting 88% on 4 L, and appeared to be short of breath.  She was brought to the ER for evaluation.    In the ER, vitals showed a blood pressure of 145/65, heart rate of 103, respiratory rate of 20, afebrile satting 60% on 4 L.  Immediately patient was placed on non-rebreather.  CBC with white count of 16.8, and macrocytic anemia.  CMP showed hypokalemia of 3.4.  First troponin is elevated at 46.  Point of care Lactic acid was 2.1.  Blood cultures were collected.  EKG showed sinus rhythm.  Chest x-ray showed no significant change of bilateral diffuse mixed interstitial and airspace lung opacities.  CTA chest was done, and it was negative for PE, but showed extensive interstitial and airspace opacities throughout both lungs, having significantly worsened in the left lung compared to prior CT. Unchanged small to moderate sized bilateral pleural effusions.  Cefepime was ordered, and patient will be admitted to Medicine for further care of her severe sepsis.    Overview/Hospital Course:  Ms. Otero has been monitored closely during her hospitalization. She was admitted on 11/1/2024 with acute  on chronic hypoxemic resp failure. CTA chest reveals extensive interstitial opacities c/w multifocal pna and/or ARDS. She requires high flow nasal cannula up to 15L now on 13L. Pulm and ID have been consulted. Pulm recommends broad spectrum abx - cefepime, doxy, and vanc initially. MRSA screen negative and vanc d/c'ed. She has stage IV lung ca and completed Carbo/Pemetrexed last year and is currently on pemetrexed (Alimta) maintenance with last dose 10/28. This can cause an interstitial pneumonitis and pulm has started prednisone. Plan for bronchoscopy on Monday if no improvement and recommended continuing eliquis. BiPAP qHS and naps. Duonebs Q6h. ID has ordered a respiratory infection panel that is negative. She's had abdominal bloating and discomfort for some time and had an outpt CT abd/pelvis with gastric wall thickening that is is nonspecific could be related to gastritis. She has a history of peptic ulcers and PCP started protonix/carafate. KUB on 11/2 with nonobstructive bowel gas pattern and moderate stool burden and pt was treated with laxative suppository. She has chronic macrocytic anemia that appears at baseline. She had a leukocytosis with left shift on admission that has trended down from 16K-->12K-->8K. CRP 36, procal 2.9. Pt had a BM on 11/2, but abd remained distended. She was found to be retaining a significant amount of urine on bladder scan >900 and priest was placed with 1500mL out with resolution of abd distention. Pulm increased steriods on 11/3 to IV solumedrol, she's requiring BiPAP support and was given IV lasix. Macrocytic anemia at baseline on admission but trending down and she will receive 1 unit PRBCs 11/4. She has developed steroid induced hyperglycemia and insulin sliding titrated up to moderate dose for tighter glucose control. Priest d/c'ed on 11/5. PT/OT consulted and pt up to chair on 11/5. She desatted with movement and took some time and increased supplemental O2 to recover.  Prior to moving to the chair, O2 was weaned down to 10L high flow.    Interval History:   Patient currently on 4 L via nasal cannula.  She endorses she feels better today than she did on yesterday.  Patient is willing to work with physical therapy on today.  Discharge disposition pending   Review of Systems   Constitutional:  Positive for fatigue. Negative for chills and fever.   Respiratory:  Positive for cough and shortness of breath (With Exertion).    Cardiovascular: Negative.    Gastrointestinal: Negative.    Genitourinary: Negative.    Neurological:  Positive for weakness.     Objective:     Vital Signs (Most Recent):  Temp: 98.5 °F (36.9 °C) (11/09/24 1130)  Pulse: 80 (11/09/24 1353)  Resp: 18 (11/09/24 1353)  BP: 124/64 (11/09/24 1130)  SpO2: 97 % (11/09/24 1353) Vital Signs (24h Range):  Temp:  [97.3 °F (36.3 °C)-98.5 °F (36.9 °C)] 98.5 °F (36.9 °C)  Pulse:  [68-83] 80  Resp:  [16-20] 18  SpO2:  [93 %-97 %] 97 %  BP: (124-153)/(55-66) 124/64     Weight: 49.5 kg (109 lb 2 oz)  Body mass index is 18.73 kg/m².    Intake/Output Summary (Last 24 hours) at 11/9/2024 1412  Last data filed at 11/9/2024 1252  Gross per 24 hour   Intake 1100 ml   Output 2950 ml   Net -1850 ml         Physical Exam  Vitals and nursing note reviewed. Exam conducted with a chaperone present.   Constitutional:       Comments: Chronically ill appearing   HENT:      Head: Normocephalic and atraumatic.      Nose: Nose normal.      Mouth/Throat:      Mouth: Mucous membranes are moist.      Pharynx: Oropharynx is clear.   Eyes:      Pupils: Pupils are equal, round, and reactive to light.   Cardiovascular:      Rate and Rhythm: Normal rate.   Pulmonary:      Effort: Pulmonary effort is normal.      Comments: Coarse and diminished throughout  Abdominal:      Comments: Abd is firm, mildly distended and nontender Bsx4 quadrants   Musculoskeletal:         General: Normal range of motion.      Cervical back: Normal range of motion and neck supple.    Skin:     General: Skin is warm.      Capillary Refill: Capillary refill takes 2 to 3 seconds.      Findings: Bruising present.   Neurological:      General: No focal deficit present.      Mental Status: She is alert and oriented to person, place, and time.   Psychiatric:         Mood and Affect: Mood normal.         Behavior: Behavior normal.             Significant Labs: All pertinent labs within the past 24 hours have been reviewed.  CBC:   Recent Labs   Lab 11/08/24  0441 11/09/24  0631   WBC 5.34 7.43   HGB 9.0* 8.4*   HCT 27.9* 26.7*   PLT 85* 73*     CMP:   Recent Labs   Lab 11/08/24  0441 11/09/24  0631    138   K 4.7 4.6   CL 96 99   CO2 36* 38*   * 123*   BUN 46* 42*   CREATININE 1.4 1.3   CALCIUM 8.4* 8.3*   PROT 5.0* 4.9*   ALBUMIN 2.5* 2.5*   BILITOT 0.4 0.4   ALKPHOS 42* 45*   AST 13 13   ALT 18 20   ANIONGAP 4* 1*     Magnesium:   Recent Labs   Lab 11/08/24  0441 11/09/24  0631   MG 2.0 1.8     POCT Glucose:   Recent Labs   Lab 11/08/24  1212 11/08/24  1648 11/08/24  1929   POCTGLUCOSE 228* 281* 338*       Significant Imaging: I have reviewed all pertinent imaging results/findings within the past 24 hours.  Imaging Results              CTA Chest Non-Coronary (PE Studies) (Final result)  Result time 11/01/24 12:51:32      Final result by Davide Jeffrey MD (11/01/24 12:51:32)                   Impression:      1. Negative for pulmonary thromboembolism.  2. Extensive interstitial and airspace opacities throughout both lungs, having significantly worsened in the left lung compared to prior CT.  Multifocal pneumonia, drug reaction, and or developing ARDS could have this appearance.  3. Unchanged small to moderate sized bilateral pleural effusions.  4. Additional observations as described.  .      Electronically signed by: Davide Jeffrey  Date:    11/01/2024  Time:    12:51               Narrative:    EXAMINATION:  CTA CHEST NON CORONARY (PE STUDIES)    CLINICAL HISTORY:  Pulmonary embolism  (PE) suspected, high prob; lung cancer    TECHNIQUE:  Thin axial imaging through the chest was performed with 100 mL Omnipaque 350 IV contrast, with sagittal and coronal reformatted images and MIP reconstructions performed, with images stored in the patient's permanent electronic medical record.    FINDINGS:  Comparison to multiple prior exams.  There are no pulmonary arterial filling defects to suggest pulmonary thromboembolism.  The central pulmonary arteries are normal in caliber.    Diffuse calcified plaque involves normal-caliber thoracic aorta, with the heart enlarged and no pericardial effusion.  There are extensive coronary arterial calcifications, with dense mitral annular calcifications.  No enlarged mediastinal or hilar lymph nodes.    There is unchanged volume loss in the right hemithorax, with extensive interstitial and airspace opacities throughout both lungs, in the left lung having significantly worsened compared to the 10/07/2024.  There are scattered lucencies reflecting pulmonary emphysema.  Small to moderate sized low-density bilateral pleural effusions are unchanged, with the central airways patent.  No pneumothorax.    Images of the upper abdomen show stable hepatic hypodensity, cholecystectomy clips, and scattered colon diverticula.  There are no acute fractures or destructive osseous lesions.                                       X-Ray Chest AP Portable (Final result)  Result time 11/01/24 10:39:57      Final result by Kp Leiva DO (11/01/24 10:39:57)                   Impression:      No significant change of bilateral diffuse mixed interstitial and airspace lung opacities.      Electronically signed by: Kp Leiva  Date:    11/01/2024  Time:    10:39               Narrative:    EXAMINATION:  XR CHEST AP PORTABLE    CLINICAL HISTORY:  Sepsis;    FINDINGS:  Portable chest with comparison chest x-ray 10/10/2024.  Normal cardiomediastinal silhouette.Bilateral diffuse mixed  interstitial and airspace lung opacities are unchanged from previous exam.  Right subclavian Port-A-Cath is unchanged.  Pulmonary vasculature is normal. No acute osseous abnormality.                                       Assessment/Plan:      * Acute on chronic hypoxic respiratory failure  Likely secondary to her bilateral extensive pneumonia vs pneumonitis  On 4L NC at home  Currently on high flow nasal cannula and intermittent bipap  Pulm consulted   CTA chest ruled out PE.  Echo done last month showed normal systolic function.   Pneumonia vs. Pneumonitis? - pulm added prednisone initially and escalated to IV solumedrol for possible Pemetrexed induced pneumonitis   IV lasix on 11/3  Currently on 4 L  11/7 pulmonale switch patient to p.o. prednisone.  And recommended a taper over several weeks  Repeat chest x-ray in 2 weeks      Urinary retention  Constipation likely contributing  She had a couple of Bms and started bowel regimen  Priest placed for significant retention   D/C priest 11/5 and had to be reinserted on 11/6  Flomax initiated  Urology consulted:  Recommended follow up outpatient.  Keep Priest in place for 5-7 days before voiding trial.  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 Most recent hemoglobin and hematocrit are listed below.  Recent Labs     11/07/24  0600 11/08/24  0441 11/09/24  0631   HGB 9.1* 9.0* 8.4*   HCT 28.5* 27.9* 26.7*       Plan  - Monitor serial CBC: Daily  - Transfuse PRBC if patient becomes hemodynamically unstable, symptomatic or H/H drops below 7/21.  - Patient has received 1 units of PRBCs on 11/4  - Patient's anemia is currently stable  - T&S in AM in the event she requires a transfusion    Multifocal pneumonia vs. pneumonitis  Patient was started on cefepime, doxy for atypicals, and Vanc, vanc d/c'ed with negative MRSA screen  Pulmonary and Infectious Disease following  Antibiotic therapy complete    Antibiotics (From  admission, onward)      Start     Stop Route Frequency Ordered    11/01/24 1300  mupirocin 2 % ointment         11/06/24 0859 Nasl 2 times daily 11/01/24 1200            Microbiology Results (last 7 days)       Procedure Component Value Units Date/Time    Blood culture x two cultures. Draw prior to antibiotics. [[id number]] Collected: 11/01/24 1045    Order Status: Completed Specimen: Blood from Peripheral, Antecubital, Left Updated: 11/06/24 1032     Blood Culture, Routine No growth after 5 days.    Narrative:      Aerobic and anaerobic    Blood culture x two cultures. Draw prior to antibiotics. [[id number]] Collected: 11/01/24 1051    Order Status: Completed Specimen: Blood from Peripheral, Antecubital, Right Updated: 11/06/24 1032     Blood Culture, Routine No growth after 5 days.    Narrative:      Aerobic and anaerobic    MRSA Screen by PCR [[id number]] Collected: 11/02/24 1329    Order Status: Completed Specimen: Nasal Swab Updated: 11/02/24 1532     MRSA SCREEN BY PCR Negative    Respiratory Infection Panel (PCR), Nasopharyngeal [[id number]] Collected: 11/02/24 1329    Order Status: Completed Specimen: Nasopharyngeal Swab Updated: 11/02/24 1509     Respiratory Infection Panel Source NP swab     Adenovirus Not Detected     Coronavirus 229E, Common Cold Virus Not Detected     Coronavirus HKU1, Common Cold Virus Not Detected     Coronavirus NL63, Common Cold Virus Not Detected     Coronavirus OC43, Common Cold Virus Not Detected     Comment: Coronavirus strains 229E, HKU1, NL63, and OC43 can cause the common   cold   and are not associated with the respiratory disease outbreak caused   by  the COVID-19 (SARS-CoV-2 novel Coronavirus) strain.           SARS-CoV2 (COVID-19) Qualitative PCR Not Detected     Human Metapneumovirus Not Detected     Human Rhinovirus/Enterovirus Not Detected     Influenza A (subtypes H1, H1-2009,H3) Not Detected     Influenza B Not Detected     Parainfluenza Virus 1 Not Detected      Parainfluenza Virus 2 Not Detected     Parainfluenza Virus 3 Not Detected     Parainfluenza Virus 4 Not Detected     Respiratory Syncytial Virus Not Detected     Bordetella Parapertussis (FG6238) Not Detected     Bordetella pertussis (ptxP) Not Detected     Chlamydia pneumoniae Not Detected     Mycoplasma pneumoniae Not Detected     Comment: Respiratory Infection Panel testing performed by Multiplex PCR.       Narrative:      Respiratory Infection Panel source->NP Swab    Culture, Respiratory with Gram Stain [[id number]]     Order Status: Sent Specimen: Respiratory from Sputum, Expectorated             Pleural effusion  Bilateral, stable.   Pulm consulted  In the setting of lung ca      Malignant neoplasm of lower lobe of right lung  Carbo/Pemetrexed until 4/2024 now on Pemetrexed maintenance with last dose 10/28/24  Consult pt's oncologist, Dr. Cassidy following      Atrial fibrillation  Patient has paroxysmal (<7 days) atrial fibrillation. Patient is currently in sinus rhythm. [id number] Score: 5. The patients heart rate in the last 24 hours is as follows:  Pulse  Min: 69  Max: 78     Antiarrhythmics  Amiodarone and metoprolol    Anticoagulants  Eliquis    Plan  - Replete lytes with a goal of K>4, Mg >2  - Patient is anticoagulated, see medications listed above.  - Patient's afib is currently controlled      VTE Risk Mitigation (From admission, onward)           Ordered     apixaban tablet 2.5 mg  2 times daily         11/01/24 1316     IP VTE HIGH RISK PATIENT  Once         11/01/24 1158     Place sequential compression device  Until discontinued         11/01/24 1158                    Discharge Planning   ALYCIA: 11/11/2024     Code Status: DNR   Is the patient medically ready for discharge?:     Reason for patient still in hospital (select all that apply): Patient trending condition, Treatment, Consult recommendations, and Pending disposition  Discharge Plan A: Skilled Nursing Facility                  Tasha  SAUL Medellin NP  Department of Hospital Medicine   [facility]

## 2024-11-10 NOTE — SUBJECTIVE & OBJECTIVE
Interval History:   Patient seen and examined. NAD. Oxygen demand increased to 6l. Pulmonology increasing steroids.   Review of Systems   Constitutional:  Positive for fatigue. Negative for chills and fever.   Respiratory:  Positive for shortness of breath (With Exertion). Negative for cough.    Cardiovascular: Negative.    Gastrointestinal: Negative.    Genitourinary: Negative.    Neurological:  Positive for weakness.     Objective:     Vital Signs (Most Recent):  Temp: 97.8 °F (36.6 °C) (11/10/24 1137)  Pulse: 80 (11/10/24 1137)  Resp: 20 (11/10/24 1137)  BP: 139/62 (11/10/24 1137)  SpO2: 95 % (11/10/24 1137) Vital Signs (24h Range):  Temp:  [97.7 °F (36.5 °C)-98 °F (36.7 °C)] 97.8 °F (36.6 °C)  Pulse:  [78-87] 80  Resp:  [18-20] 20  SpO2:  [82 %-97 %] 95 %  BP: (139-163)/(61-69) 139/62     Weight: 49.5 kg (109 lb 2 oz)  Body mass index is 18.73 kg/m².    Intake/Output Summary (Last 24 hours) at 11/10/2024 1316  Last data filed at 11/10/2024 0909  Gross per 24 hour   Intake 1040 ml   Output 2250 ml   Net -1210 ml         Physical Exam  Vitals and nursing note reviewed. Exam conducted with a chaperone present.   Constitutional:       Comments: Chronically ill appearing   HENT:      Head: Normocephalic and atraumatic.      Nose: Nose normal.      Mouth/Throat:      Mouth: Mucous membranes are moist.      Pharynx: Oropharynx is clear.   Eyes:      Pupils: Pupils are equal, round, and reactive to light.   Cardiovascular:      Rate and Rhythm: Normal rate.   Pulmonary:      Effort: Pulmonary effort is normal.      Comments: Coarse and diminished throughout  Abdominal:      Comments: Abd is firm, mildly distended and nontender Bsx4 quadrants   Musculoskeletal:         General: Normal range of motion.      Cervical back: Normal range of motion and neck supple.   Skin:     General: Skin is warm.      Capillary Refill: Capillary refill takes 2 to 3 seconds.      Findings: Bruising present.   Neurological:      General: No  focal deficit present.      Mental Status: She is alert and oriented to person, place, and time.   Psychiatric:         Mood and Affect: Mood normal.         Behavior: Behavior normal.             Significant Labs: All pertinent labs within the past 24 hours have been reviewed.  CBC:   Recent Labs   Lab 11/09/24  0631 11/10/24  0837   WBC 7.43 8.79   HGB 8.4* 8.4*   HCT 26.7* 26.1*   PLT 73* 78*     CMP:   Recent Labs   Lab 11/09/24  0631 11/10/24  0837    138   K 4.6 4.2   CL 99 99   CO2 38* 37*   * 141*   BUN 42* 39*   CREATININE 1.3 1.4   CALCIUM 8.3* 8.4*   PROT 4.9* 5.1*   ALBUMIN 2.5* 2.6*   BILITOT 0.4 0.5   ALKPHOS 45* 47*   AST 13 13   ALT 20 17   ANIONGAP 1* 2*     Magnesium:   Recent Labs   Lab 11/09/24  0631 11/10/24  0837   MG 1.8 1.8     POCT Glucose:   Recent Labs   Lab 11/09/24  1919 11/10/24  0753 11/10/24  1200   POCTGLUCOSE 311* 155* 277*       Significant Imaging: I have reviewed all pertinent imaging results/findings within the past 24 hours.

## 2024-11-10 NOTE — ASSESSMENT & PLAN NOTE
Likely secondary to her bilateral extensive pneumonia vs pneumonitis  On 4L NC at home  Currently on high flow nasal cannula and intermittent bipap  Pulm consulted   CTA chest ruled out PE.  Echo done last month showed normal systolic function.   Pneumonia vs. Pneumonitis? - pulm added prednisone initially and escalated to IV solumedrol for possible Pemetrexed induced pneumonitis   IV lasix on 11/3  Currently on 4 L  11/10 PO steroids increased

## 2024-11-10 NOTE — CARE UPDATE
11/10/24 0704   Patient Assessment/Suction   Level of Consciousness (AVPU) alert   Respiratory Effort Unlabored   Expansion/Accessory Muscles/Retractions no use of accessory muscles   All Lung Fields Breath Sounds Anterior:;Lateral:;diminished   Rhythm/Pattern, Respiratory unlabored;pattern regular;depth regular   Skin Integrity   $ Wound Care Tech Time 15 min   Area Observed Left;Right;Behind ear;Cheek   Skin Appearance without discoloration   PRE-TX-O2   Device (Oxygen Therapy) high flow nasal cannula   $ Is the patient on High Flow Oxygen? Yes   Flow (L/min) (Oxygen Therapy) 5   SpO2 (!) 92 %   Pulse Oximetry Type Intermittent   $ Pulse Oximetry - Multiple Charge Pulse Oximetry - Multiple   Pulse 81   Resp 20   Positioning   Body Position sitting up in bed   Head of Bed (HOB) Positioning HOB lowered   Aerosol Therapy   $ Aerosol Therapy Charges Aerosol Treatment   Daily Review of Necessity (SVN) completed   Respiratory Treatment Status (SVN) given   Treatment Route (SVN) mask   Patient Position HOB elevated   Post Treatment Assessment (SVN) breath sounds unchanged   Signs of Intolerance (SVN) none   Breath Sounds Post-Respiratory Treatment   Throughout All Fields Post-Treatment All Fields   Throughout All Fields Post-Treatment Anterior:;Posterior:;Lateral:   Post-treatment Heart Rate (beats/min) 90   Post-treatment Resp Rate (breaths/min) 18   Incentive Spirometer   $ Incentive Spirometer Charges done with encouragement   Incentive Spirometer Predicted Level (mL) 1750   Administration (IS) proper technique demonstrated   Number of Repetitions (IS) 10   Level Incentive Spirometer (mL) 500   Patient Tolerance (IS) fair   Respiratory Interventions   Cough And Deep Breathing done with encouragement   Preset CPAP/BiPAP Settings   Mode Of Delivery BiPAP;Standby   $ CPAP/BiPAP Daily Charge 1   CPAP/BIPAP charged w/in last 24 h YES

## 2024-11-10 NOTE — ASSESSMENT & PLAN NOTE
Patient was started on cefepime, doxy for atypicals, and Vanc, vanc d/c'ed with negative MRSA screen  Pulmonary and Infectious Disease following  Steroids increased by pulmonology   Antibiotic therapy complete    Antibiotics (From admission, onward)      Start     Stop Route Frequency Ordered    11/01/24 1300  mupirocin 2 % ointment         11/06/24 0859 Nasl 2 times daily 11/01/24 1200            Microbiology Results (last 7 days)       Procedure Component Value Units Date/Time    Blood culture x two cultures. Draw prior to antibiotics. [[id number]] Collected: 11/01/24 1045    Order Status: Completed Specimen: Blood from Peripheral, Antecubital, Left Updated: 11/06/24 1032     Blood Culture, Routine No growth after 5 days.    Narrative:      Aerobic and anaerobic    Blood culture x two cultures. Draw prior to antibiotics. [[id number]] Collected: 11/01/24 1051    Order Status: Completed Specimen: Blood from Peripheral, Antecubital, Right Updated: 11/06/24 1032     Blood Culture, Routine No growth after 5 days.    Narrative:      Aerobic and anaerobic           Is This A New Presentation, Or A Follow-Up?: Skin Lesion How Severe Is Your Skin Lesion?: moderate Has Your Skin Lesion Been Treated?: not been treated

## 2024-11-10 NOTE — PROGRESS NOTES
[facility] Medicine  Progress Note    Patient Name: Alexa Otero  MRN: [id number]  Patient Class: IP- Inpatient   Admission Date: 11/1/2024  Length of Stay: 9 days  Attending Physician: Judy Birmingham MD  Primary Care Provider: Idalia Greco MD        Subjective:     Principal Problem:Acute on chronic hypoxic respiratory failure        HPI:  84-year-old female with a past medical history of lung cancer actively getting chemo last of which was 5 days ago, who presented to the ER because of generalized weakness.  According to the patient, she woke up today, tried to get out of bed, but felt very weak, and slid beside the bed.  Did not hit her head, and did not lose consciousness.  She however could not get up however, and her  could not assist.  911 was called.  On arrival of EMS, patient was satting 88% on 4 L, and appeared to be short of breath.  She was brought to the ER for evaluation.    In the ER, vitals showed a blood pressure of 145/65, heart rate of 103, respiratory rate of 20, afebrile satting 60% on 4 L.  Immediately patient was placed on non-rebreather.  CBC with white count of 16.8, and macrocytic anemia.  CMP showed hypokalemia of 3.4.  First troponin is elevated at 46.  Point of care Lactic acid was 2.1.  Blood cultures were collected.  EKG showed sinus rhythm.  Chest x-ray showed no significant change of bilateral diffuse mixed interstitial and airspace lung opacities.  CTA chest was done, and it was negative for PE, but showed extensive interstitial and airspace opacities throughout both lungs, having significantly worsened in the left lung compared to prior CT. Unchanged small to moderate sized bilateral pleural effusions.  Cefepime was ordered, and patient will be admitted to Medicine for further care of her severe sepsis.    Overview/Hospital Course:  Ms. Otero has been monitored closely during her hospitalization. She was admitted on 11/1/2024 with acute  on chronic hypoxemic resp failure. CTA chest reveals extensive interstitial opacities c/w multifocal pna and/or ARDS. She requires high flow nasal cannula up to 15L now on 13L. Pulm and ID have been consulted. Pulm recommends broad spectrum abx - cefepime, doxy, and vanc initially. MRSA screen negative and vanc d/c'ed. She has stage IV lung ca and completed Carbo/Pemetrexed last year and is currently on pemetrexed (Alimta) maintenance with last dose 10/28. This can cause an interstitial pneumonitis and pulm has started prednisone. Plan for bronchoscopy on Monday if no improvement and recommended continuing eliquis. BiPAP qHS and naps. Duonebs Q6h. ID has ordered a respiratory infection panel that is negative. She's had abdominal bloating and discomfort for some time and had an outpt CT abd/pelvis with gastric wall thickening that is is nonspecific could be related to gastritis. She has a history of peptic ulcers and PCP started protonix/carafate. KUB on 11/2 with nonobstructive bowel gas pattern and moderate stool burden and pt was treated with laxative suppository. She has chronic macrocytic anemia that appears at baseline. She had a leukocytosis with left shift on admission that has trended down from 16K-->12K-->8K. CRP 36, procal 2.9. Pt had a BM on 11/2, but abd remained distended. She was found to be retaining a significant amount of urine on bladder scan >900 and priest was placed with 1500mL out with resolution of abd distention. Pulm increased steriods on 11/3 to IV solumedrol, she's requiring BiPAP support and was given IV lasix. Macrocytic anemia at baseline on admission but trending down and she will receive 1 unit PRBCs 11/4. She has developed steroid induced hyperglycemia and insulin sliding titrated up to moderate dose for tighter glucose control. Priest d/c'ed on 11/5. PT/OT consulted and pt up to chair on 11/5. She desatted with movement and took some time and increased supplemental O2 to recover.  Prior to moving to the chair, O2 was weaned down to 10L high flow.    Interval History:   Patient seen and examined. NAD. Oxygen demand increased to 6l. Pulmonology increasing steroids.   Review of Systems   Constitutional:  Positive for fatigue. Negative for chills and fever.   Respiratory:  Positive for shortness of breath (With Exertion). Negative for cough.    Cardiovascular: Negative.    Gastrointestinal: Negative.    Genitourinary: Negative.    Neurological:  Positive for weakness.     Objective:     Vital Signs (Most Recent):  Temp: 97.8 °F (36.6 °C) (11/10/24 1137)  Pulse: 80 (11/10/24 1137)  Resp: 20 (11/10/24 1137)  BP: 139/62 (11/10/24 1137)  SpO2: 95 % (11/10/24 1137) Vital Signs (24h Range):  Temp:  [97.7 °F (36.5 °C)-98 °F (36.7 °C)] 97.8 °F (36.6 °C)  Pulse:  [78-87] 80  Resp:  [18-20] 20  SpO2:  [82 %-97 %] 95 %  BP: (139-163)/(61-69) 139/62     Weight: 49.5 kg (109 lb 2 oz)  Body mass index is 18.73 kg/m².    Intake/Output Summary (Last 24 hours) at 11/10/2024 1316  Last data filed at 11/10/2024 0909  Gross per 24 hour   Intake 1040 ml   Output 2250 ml   Net -1210 ml         Physical Exam  Vitals and nursing note reviewed. Exam conducted with a chaperone present.   Constitutional:       Comments: Chronically ill appearing   HENT:      Head: Normocephalic and atraumatic.      Nose: Nose normal.      Mouth/Throat:      Mouth: Mucous membranes are moist.      Pharynx: Oropharynx is clear.   Eyes:      Pupils: Pupils are equal, round, and reactive to light.   Cardiovascular:      Rate and Rhythm: Normal rate.   Pulmonary:      Effort: Pulmonary effort is normal.      Comments: Coarse and diminished throughout  Abdominal:      Comments: Abd is firm, mildly distended and nontender Bsx4 quadrants   Musculoskeletal:         General: Normal range of motion.      Cervical back: Normal range of motion and neck supple.   Skin:     General: Skin is warm.      Capillary Refill: Capillary refill takes 2 to 3  seconds.      Findings: Bruising present.   Neurological:      General: No focal deficit present.      Mental Status: She is alert and oriented to person, place, and time.   Psychiatric:         Mood and Affect: Mood normal.         Behavior: Behavior normal.             Significant Labs: All pertinent labs within the past 24 hours have been reviewed.  CBC:   Recent Labs   Lab 11/09/24  0631 11/10/24  0837   WBC 7.43 8.79   HGB 8.4* 8.4*   HCT 26.7* 26.1*   PLT 73* 78*     CMP:   Recent Labs   Lab 11/09/24  0631 11/10/24  0837    138   K 4.6 4.2   CL 99 99   CO2 38* 37*   * 141*   BUN 42* 39*   CREATININE 1.3 1.4   CALCIUM 8.3* 8.4*   PROT 4.9* 5.1*   ALBUMIN 2.5* 2.6*   BILITOT 0.4 0.5   ALKPHOS 45* 47*   AST 13 13   ALT 20 17   ANIONGAP 1* 2*     Magnesium:   Recent Labs   Lab 11/09/24  0631 11/10/24  0837   MG 1.8 1.8     POCT Glucose:   Recent Labs   Lab 11/09/24  1919 11/10/24  0753 11/10/24  1200   POCTGLUCOSE 311* 155* 277*       Significant Imaging: I have reviewed all pertinent imaging results/findings within the past 24 hours.      Assessment/Plan:      * Acute on chronic hypoxic respiratory failure  Likely secondary to her bilateral extensive pneumonia vs pneumonitis  On 4L NC at home  Currently on high flow nasal cannula and intermittent bipap  Pulm consulted   CTA chest ruled out PE.  Echo done last month showed normal systolic function.   Pneumonia vs. Pneumonitis? - pulm added prednisone initially and escalated to IV solumedrol for possible Pemetrexed induced pneumonitis   IV lasix on 11/3  Currently on 4 L  11/10 PO steroids increased        Urinary retention  Constipation likely contributing  She had a couple of Bms and started bowel regimen  Priest placed for significant retention   D/C priest 11/5 and had to be reinserted on 11/6  Flomax initiated  Urology consulted:  Recommended follow up outpatient.  Keep Priest in place for 5-7 days before voiding trial.  Continued  Flomax    Macrocytic anemia  Anemia is likely due to chronic disease due to Malignancy. Most recent hemoglobin and hematocrit are listed below.  Recent Labs     11/08/24  0441 11/09/24  0631 11/10/24  0837   HGB 9.0* 8.4* 8.4*   HCT 27.9* 26.7* 26.1*       Plan  - Monitor serial CBC: Daily  - Transfuse PRBC if patient becomes hemodynamically unstable, symptomatic or H/H drops below 7/21.  - Patient has received 1 units of PRBCs on 11/4  - Patient's anemia is currently stable  - T&S in AM in the event she requires a transfusion    Multifocal pneumonia vs. pneumonitis  Patient was started on cefepime, doxy for atypicals, and Vanc, vanc d/c'ed with negative MRSA screen  Pulmonary and Infectious Disease following  Steroids increased by pulmonology   Antibiotic therapy complete    Antibiotics (From admission, onward)      Start     Stop Route Frequency Ordered    11/01/24 1300  mupirocin 2 % ointment         11/06/24 0859 Nasl 2 times daily 11/01/24 1200            Microbiology Results (last 7 days)       Procedure Component Value Units Date/Time    Blood culture x two cultures. Draw prior to antibiotics. [[id number]] Collected: 11/01/24 1045    Order Status: Completed Specimen: Blood from Peripheral, Antecubital, Left Updated: 11/06/24 1032     Blood Culture, Routine No growth after 5 days.    Narrative:      Aerobic and anaerobic    Blood culture x two cultures. Draw prior to antibiotics. [[id number]] Collected: 11/01/24 1051    Order Status: Completed Specimen: Blood from Peripheral, Antecubital, Right Updated: 11/06/24 1032     Blood Culture, Routine No growth after 5 days.    Narrative:      Aerobic and anaerobic            Pleural effusion  Bilateral, stable.   Pulm consulted  In the setting of lung ca      Malignant neoplasm of lower lobe of right lung  Carbo/Pemetrexed until 4/2024 now on Pemetrexed maintenance with last dose 10/28/24  Consult pt's oncologist, Dr. Cassidy following      Atrial  fibrillation  Patient has paroxysmal (<7 days) atrial fibrillation. Patient is currently in sinus rhythm. [id number] Score: 5. The patients heart rate in the last 24 hours is as follows:  Pulse  Min: 69  Max: 78     Antiarrhythmics  Amiodarone and metoprolol    Anticoagulants  Eliquis    Plan  - Replete lytes with a goal of K>4, Mg >2  - Patient is anticoagulated, see medications listed above.  - Patient's afib is currently controlled      VTE Risk Mitigation (From admission, onward)           Ordered     apixaban tablet 2.5 mg  2 times daily         11/01/24 1316     IP VTE HIGH RISK PATIENT  Once         11/01/24 1158     Place sequential compression device  Until discontinued         11/01/24 1158                    Discharge Planning   ALYCIA: 11/11/2024     Code Status: DNR   Is the patient medically ready for discharge?:     Reason for patient still in hospital (select all that apply): Patient trending condition, Treatment, PT / OT recommendations, and Pending disposition  Discharge Plan A: Skilled Nursing Facility                  Susan Medellin NP  Department of Hospital Medicine   [facility]

## 2024-11-10 NOTE — ASSESSMENT & PLAN NOTE
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 Most recent hemoglobin and hematocrit are listed below.  Recent Labs     11/08/24  0441 11/09/24  0631 11/10/24  0837   HGB 9.0* 8.4* 8.4*   HCT 27.9* 26.7* 26.1*       Plan  - Monitor serial CBC: Daily  - Transfuse PRBC if patient becomes hemodynamically unstable, symptomatic or H/H drops below 7/21.  - Patient has received 1 units of PRBCs on 11/4  - Patient's anemia is currently stable  - T&S in AM in the event she requires a transfusion

## 2024-11-10 NOTE — CARE UPDATE
11/09/24 1958   Patient Assessment/Suction   Level of Consciousness (AVPU) alert   Respiratory Effort Unlabored   Expansion/Accessory Muscles/Retractions no retractions;no use of accessory muscles   All Lung Fields Breath Sounds Anterior:;Lateral:   ROSINA Breath Sounds diminished   Rhythm/Pattern, Respiratory pattern regular;unlabored   PRE-TX-O2   Device (Oxygen Therapy) high flow nasal cannula   Flow (L/min) (Oxygen Therapy) 5   SpO2 (!) 91 %   Pulse Oximetry Type Intermittent   $ Pulse Oximetry - Multiple Charge Pulse Oximetry - Multiple   Pulse 83   Resp 18   Aerosol Therapy   $ Aerosol Therapy Charges Aerosol Treatment   Daily Review of Necessity (SVN) completed   Respiratory Treatment Status (SVN) given   Treatment Route (SVN) mask   Patient Position HOB elevated   Post Treatment Assessment (SVN) breath sounds unchanged   Signs of Intolerance (SVN) none   Preset CPAP/BiPAP Settings   Mode Of Delivery BiPAP;Standby   CPAP/BIPAP charged w/in last 24 h YES   $ Is patient using? No/refused   Reason patient is not wearing? Patient refused   Education   $ Education Bronchodilator;Oxygen;15 min        DISPLAY PLAN FREE TEXT

## 2024-11-10 NOTE — PROGRESS NOTES
Pulmonary/Critical Care Progress Note      PATIENT NAME: Alexa Otero  MRN: [id number]  TODAY'S DATE: 11/10/2024  11:55 AM  ADMIT DATE: 2024  AGE: 84 y.o. 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    She reports progressive fatigue weakness prior to admission to the hospital.  She reports that she slid out of her bed and that is the reason she came to the hospital.  She reports that she has not had any fever cough night sweats, but she has some worsening shortness of breath.    11/3   - rough night, had worsening shortness of breath and is required non-rebreather in addition no nasal cannula currently she is on high-flow at 10 liters/minute     the patient is feeling a little better today.  She is on 15 L high-flow nasal cannula.  Her CT shows her chronically infiltrated right lung with the surrounding effusion.  Her left lung which was normal looking 2 weeks ago now is diffusely infiltrated with ground-glass opacities.  This must be the Alimta.     the patient is improving with the steroids.  She looks much brighter today.  She is feeling better.  She is on 10 L high-flow nasal cannula.     the patient is very sleepy today.  The nurse reports she was up earlier and ate breakfast and sat up.  Her oxygen is set on 10 L.  Her sats remain 99% on 5 L. I have asked the nurse to try to wean this.     the patient is looking good today.  She is down to 3 L nasal cannula.  Her chest x-ray is improving but her left thorax is not yet normal. We need to get her priest out and her walking.     the patient is too  weak to participate with PT she says.  She also states that it makes her very short of breath.  Her saturations are difficult to .  Urology would like to leave the Rausch in.  Obviously, the patient is going to need to go to a nursing home from here.  She states she will be here over the weekend.    11/9 the patient states physical therapy has not yet come to see her today.  States she is feeling good from the waist up.  She is on 4 L nasal cannula.    11/10 the patient was not seen by physical therapy yesterday.  This is a shame.  She is concerned about still having a Rausch catheter.  Explained that Dr. Jon isn't coming to the hospital to see her, but she needs to get to him to be evaluated. The patient's oxygen requirements are higher again today.  Will increase her prednisone to 60 mg and see if we can not stabilize this.      Review of Systems   Constitutional:  Negative for chills, fever and unexpected weight change.   HENT:  Negative for nosebleeds, postnasal drip, trouble swallowing and voice change.    Eyes:  Negative for visual disturbance.   Respiratory:  Negative for cough and wheezing.    Cardiovascular:  Negative for chest pain and leg swelling.   Gastrointestinal:  Negative for diarrhea, nausea and vomiting.   Endocrine: Negative for cold intolerance and heat intolerance.   Genitourinary:  Negative for dysuria, flank pain and frequency (.jppe).        She is worrying about still having a Rausch catheter.   Musculoskeletal:  Negative for neck pain and neck stiffness.   Skin:  Positive for wound.   Allergic/Immunologic: Negative for environmental allergies and immunocompromised state.   Neurological:  Negative for syncope, speech difficulty and weakness.   Hematological:  Negative for adenopathy.   Psychiatric/Behavioral:  Negative for confusion.          No change in the patient's Past Medical History, Past Surgical History, Social History or Family History since admission.    VITAL SIGNS (MOST  RECENT)  Temp: 97.9 °F (36.6 °C) (11/10/24 0755)  Pulse: 81 (11/10/24 0704)  Resp: 20 (11/10/24 0704)  BP: (!) 149/69 (11/10/24 0755)  SpO2: (!) 92 % (11/10/24 0704)    INTAKE AND OUTPUT (LAST 24 HOURS):  Intake/Output Summary (Last 24 hours) at 11/10/2024 1018  Last data filed at 11/10/2024 0909  Gross per 24 hour   Intake 1820 ml   Output 2500 ml   Net -680 ml       WEIGHT  Wt Readings from Last 1 Encounters:   11/01/24 49.5 kg (109 lb 2 oz)         PHYSICAL EXAM  GENERAL: Older patient in no distress, looking frail.  HEENT: Pupils equal and reactive. Extraocular movements intact. Nose intact.  Pharynx moist.  NECK: Supple.   HEART: Regular rate and rhythm. No murmur or gallop auscultated.  LUNGS:  There are diminished breath sounds on the right.  On the left there are diffuse crackles.  Lung excursion symmetrical. No change in fremitus.  There is a port in thorax which is accessed.  ABDOMEN: Bowel sounds present. Non-tender, no masses palpated.  : Normal anatomy. Rausch with clear urine.  EXTREMITIES: Normal muscle tone and joint movement, no cyanosis or clubbing.  She is very weak.  LYMPHATICS: No adenopathy palpated, no edema.  SKIN: Dry, intact, no lesions.  There is a stage I decubitus on the buttock.    NEURO: Cranial nerves II-XII intact. Motor strength 5/5 bilaterally, upper and lower extremities.  PSYCH: Appropriate affect.        CBC LAST (LAST 24 HOURS)  Recent Labs   Lab 11/10/24  0837   WBC 8.79   RBC 2.62*   HGB 8.4*   HCT 26.1*   *   MCH 32.1*   MCHC 32.2   RDW 17.8*   PLT 78*   MPV 10.2   GRAN 77.0*  6.8   LYMPH 7.7*  0.7*   MONO 13.3  1.2*   BASO 0.01   NRBC 0       CHEMISTRY LAST (LAST 24 HOURS)  Recent Labs   Lab 11/10/24  0837      K 4.2   CL 99   CO2 37*   ANIONGAP 2*   BUN 39*   CREATININE 1.4   *   CALCIUM 8.4*   MG 1.8   ALBUMIN 2.6*   PROT 5.1*   ALKPHOS 47*   ALT 17   AST 13   BILITOT 0.5         LAST 7 DAYS MICROBIOLOGY   Microbiology Results (last 7 days)        Procedure Component Value Units Date/Time    Blood culture x two cultures. Draw prior to antibiotics. [[id number]] Collected: 11/01/24 1045    Order Status: Completed Specimen: Blood from Peripheral, Antecubital, Left Updated: 11/06/24 1032     Blood Culture, Routine No growth after 5 days.    Narrative:      Aerobic and anaerobic    Blood culture x two cultures. Draw prior to antibiotics. [[id number]] Collected: 11/01/24 1051    Order Status: Completed Specimen: Blood from Peripheral, Antecubital, Right Updated: 11/06/24 1032     Blood Culture, Routine No growth after 5 days.    Narrative:      Aerobic and anaerobic            MOST RECENT IMAGING  X-Ray Chest AP Portable  Narrative: EXAMINATION:  XR CHEST AP PORTABLE    CLINICAL HISTORY:  Pneumonitis and bronchogenic carcinoma with a fusion;    FINDINGS:  Portable chest radiograph at 06:09 hours compared to prior exams shows right subclavian injection port type central venous catheter, unchanged in position.  The cardiomediastinal silhouette and pulmonary vasculature are stable.    Volume loss in the right hemithorax is unchanged, with stable bilateral interstitial and airspace opacities, and bilateral pleural effusions.  No new pleural or parenchymal abnormality.  Impression: No significant interval change.    Electronically signed by: Davide Jeffrey  Date:    11/07/2024  Time:    07:17      CURRENT VISIT EKG  Results for orders placed or performed during the hospital encounter of 11/01/24   EKG 12-lead   Result Value Ref Range    QRS Duration 56 ms    [facility] QTC Calculation 482 ms    Narrative    Test Reason : R06.02,    Vent. Rate : 097 BPM     Atrial Rate : 097 BPM     P-R Int : 156 ms          QRS Dur : 056 ms      QT Int : 380 ms       P-R-T Axes : 044 -06 100 degrees     QTc Int : 482 ms    Normal sinus rhythm  Low voltage QRS  Septal infarct (cited on or before 13-OCT-2023)  Abnormal ECG  When compared with ECG of 25-OCT-2024 10:48,  ST now depressed in Lateral  leads    Referred By: [email or web address]   SELF           Confirmed By:        ECHOCARDIOGRAM RESULTS  Results for orders placed during the hospital encounter of 10/07/24    Echo    Interpretation Summary    Left Ventricle: The left ventricle is normal in size. Mildly increased wall thickness. There is mild concentric hypertrophy. Moderate global hypokinesis present. There is mildly reduced systolic function with a visually estimated ejection fraction of 40 - 45%. There is normal diastolic function.    Right Ventricle: Normal right ventricular cavity size. Wall thickness is normal. Systolic function is normal.    Left Atrium: Left atrium is moderately dilated.    Mitral Valve: There is bileaflet sclerosis. There is moderate mitral annular calcification present. There is moderate stenosis. The mean pressure gradient across the mitral valve is 8 mmHg at a heart rate of  bpm. There is mild regurgitation with a centrally directed jet.    Pulmonary Artery: The estimated pulmonary artery systolic pressure is 29 mmHg.    IVC/SVC: Normal venous pressure at 3 mmHg.        Oxygen INFORMATION       5 L           PLAN:.      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  Will increase the prednisone to 60.  Will check chest x-ray in a.m.  The patient and  agreeable with a skilled nursing facility, [facility] is their choice      Neva Christian MD  Date of Service: 11/10/2024  11:55 AM

## 2024-11-11 ENCOUNTER — TELEPHONE (OUTPATIENT)
Dept: GASTROENTEROLOGY | Facility: CLINIC | Age: 84
End: 2024-11-11
Payer: MEDICARE

## 2024-11-11 ENCOUNTER — TELEPHONE (OUTPATIENT)
Dept: CARDIOLOGY | Facility: CLINIC | Age: 84
End: 2024-11-11
Payer: MEDICARE

## 2024-11-11 ENCOUNTER — E-VISIT (OUTPATIENT)
Dept: CARDIOLOGY | Facility: CLINIC | Age: 84
End: 2024-11-11
Payer: MEDICARE

## 2024-11-11 DIAGNOSIS — Z01.810 ENCOUNTER FOR PRE-OPERATIVE CARDIOVASCULAR CLEARANCE: Primary | ICD-10-CM

## 2024-11-11 PROBLEM — J98.4 PNEUMONITIS: Status: ACTIVE | Noted: 2024-11-01

## 2024-11-11 NOTE — TELEPHONE ENCOUNTER
Call placed to Ms. Otero in regards to blood thinner clearance received. No answer, left message to return call.

## 2024-11-11 NOTE — ASSESSMENT & PLAN NOTE
Patient was started on cefepime, doxy for atypicals, and Vanc, vanc d/c'ed with negative MRSA screen  Pulmonary and Infectious Disease following  Steroids increased by pulmonology   Antibiotic therapy complete    Antibiotics (From admission, onward)    Start     Stop Route Frequency Ordered    11/12/24 0900  sulfamethoxazole-trimethoprim 800-160mg per tablet 1 tablet         -- Oral Every other day 11/11/24 0839    11/01/24 1300  mupirocin 2 % ointment         11/06/24 0859 Nasl 2 times daily 11/01/24 1200          Microbiology Results (last 7 days)     Procedure Component Value Units Date/Time    Blood culture x two cultures. Draw prior to antibiotics. [[id number]] Collected: 11/01/24 1045    Order Status: Completed Specimen: Blood from Peripheral, Antecubital, Left Updated: 11/06/24 1032     Blood Culture, Routine No growth after 5 days.    Narrative:      Aerobic and anaerobic    Blood culture x two cultures. Draw prior to antibiotics. [[id number]] Collected: 11/01/24 1051    Order Status: Completed Specimen: Blood from Peripheral, Antecubital, Right Updated: 11/06/24 1032     Blood Culture, Routine No growth after 5 days.    Narrative:      Aerobic and anaerobic

## 2024-11-11 NOTE — PLAN OF CARE
DEB sent updated clinicals to [facility] to see if they are now able to accept pt. Awaiting response.    1112 - DEB sent referral to [facility].       11/11/24 1056   Post-Acute Status   Post-Acute Authorization Placement   Post-Acute Placement Status Referrals Sent   Coverage [employer] MANAGED MEDICARE - [employer] MEDICARE HMO   Discharge Delays None known at this time   Discharge Plan   Discharge Plan A Skilled Nursing Facility

## 2024-11-11 NOTE — PROGRESS NOTES
Patient requesting clearance for EGD. Last appt 10/25/24. NAD.  VSS.   CAD s/p PCI to LAD, LCX, and RCA by Dr. Julio Oct 2023.  NSR. No acute ST-T wave changes on EKG 11/1/24.  Recent VASILIY EF 55-60%, s/p VASILIY CV  Recommend 2 months anticoagulation prior to EGD  Clearance letter signed and faxed to GI

## 2024-11-11 NOTE — PLAN OF CARE
SW met with patient and daughter/Joyce at bedside to discuss placement. Patient was denied at [facility] for SNF. SW provided the family with a list of facilities to choose from. Daughter/Joyce and patient decided on [facility]. SW informed family that they would need to take a look at the list for more back up options.    SW informed Katerine BOYD.

## 2024-11-11 NOTE — PLAN OF CARE
Problem: Adult Inpatient Plan of Care  Goal: Plan of Care Review  Outcome: Progressing     Problem: Adult Inpatient Plan of Care  Goal: Absence of Hospital-Acquired Illness or Injury  Outcome: Progressing     Problem: Adult Inpatient Plan of Care  Goal: Optimal Comfort and Wellbeing  Outcome: Progressing     Problem: Sepsis/Septic Shock  Goal: Absence of Infection Signs and Symptoms  Outcome: Progressing     Problem: Sepsis/Septic Shock  Goal: Optimal Nutrition Intake  Outcome: Progressing     Problem: Pneumonia  Goal: Fluid Balance  Outcome: Progressing  Goal: Resolution of Infection Signs and Symptoms  Outcome: Progressing  Goal: Effective Oxygenation and Ventilation  Outcome: Progressing

## 2024-11-11 NOTE — PLAN OF CARE
DEB spoke to Daughter/Katerine [phone] to inform of denials. DEB presented the sister with the option of sending the referral out for a 30 mile radius and then going over acceptance. Daughter agreed and consented for referral to be sent.       11/11/24 3889   Discharge Reassessment   Assessment Type Discharge Planning Reassessment   Did the patient's condition or plan change since previous assessment? No   Discharge Plan discussed with: Patient;Adult children   Communicated ALYCIA with patient/caregiver Date not available/Unable to determine   Discharge Plan A Skilled Nursing Facility   Discharge Plan B Skilled Nursing Facility   DME Needed Upon Discharge  none   Transition of Care Barriers None   Why the patient remains in the hospital Requires continued medical care;Placement issues   Post-Acute Status   Post-Acute Authorization Placement   Post-Acute Placement Status Referrals Sent   Discharge Delays None known at this time

## 2024-11-11 NOTE — PT/OT/SLP PROGRESS
Physical Therapy Treatment    Patient Name:  Alexa Otero   MRN:  [id number]    Recommendations:     Discharge Recommendations: Moderate Intensity Therapy  Discharge Equipment Recommendations: none  Barriers to discharge:  increased assist with mobility, decreased activity tolerance, balance deficits, respiratory deficits    Assessment:     Alexa Otero is a 84 y.o. female admitted with a medical diagnosis of Acute on chronic hypoxic respiratory failure.  She presents with the following impairments/functional limitations: weakness, impaired endurance, impaired self care skills, impaired functional mobility, gait instability, impaired balance, decreased safety awareness, decreased lower extremity function, decreased upper extremity function, impaired cardiopulmonary response to activity.    Pt agreeable to visit. Pt on 5 L O2 with SPO2 87-88% at rest, increased to 6 L with SPO2 90-91%.    NP and nurse entered briefly to speak with pt. NP instructed therapist to increased O2 all the way up in preparation for mobility as pt desats so much with mobility. Pt increased to 12 L O2.    Pt performed supine to sit transfer with contact guard assist. Pt desated to 80s and required extended rest break and verbal cuing for pursed lip breathing to recover > 90%.    Pt performed stand pivot transfer from bed to chair with mod assist. Pt desatted to 76% with transfer. Pt increased to 15 L O2 to recover to 90% with verbal cuing for pursed lip breathing.    Nurse entered to give pt medication. Pt able to be titrated down to 10 L O2 but unable to titrate any lower as pt would desat 86-87% on 9 L.    Pt fatigued post session but encouraged to remain up in chair for at least an hour. Pt left up in chair with nurse informed.    Rehab Prognosis: Fair; patient would benefit from acute skilled PT services to address these deficits and reach maximum level of function.    Recent Surgery: * No surgery found *      Plan:     During this  hospitalization, patient to be seen 5 x/week to address the identified rehab impairments via gait training, therapeutic activities, therapeutic exercises and progress toward the following goals:    Plan of Care Expires:  24    Subjective     Chief Complaint: fatigue  Patient/Family Comments/goals: to get better  Pain/Comfort:  Pain Rating 1: 0/10      Objective:     Communicated with nurse prior to session.  Patient found HOB elevated with PureWick, telemetry, pulse ox (continuous), oxygen upon PT entry to room.     General Precautions: Standard, fall  Orthopedic Precautions: N/A  Braces: N/A  Respiratory Status: High flow, flow 5 > 15 L/min, concentration  %     Functional Mobility:  Bed Mobility:     Supine to Sit: contact guard assistance  Transfers:     Bed to Chair: moderate assistance with  no AD  using  Stand Pivot      AM-PAC 6 CLICK MOBILITY          Treatment & Education:  Pt educated on importance of time OOB, importance of intermittent mobility, safe techniques for transfers/ambulation, discharge recommendations/options, and use of call light for assistance and fall prevention.      Patient left up in chair with all lines intact, call button in reach, chair alarm on, and nurse notified..    GOALS:   Multidisciplinary Problems       Physical Therapy Goals          Problem: Physical Therapy    Goal Priority Disciplines Outcome Interventions   Physical Therapy Goal     PT, PT/OT Progressing    Description: Goals to be met by: 24     Patient will increase functional independence with mobility by performin. Supine to sit with Supervision  2. Sit to stand transfer with Supervision  3. Bed to chair transfer with Supervision using Rolling Walker  4. Gait  x 100 feet with Supervision using Rolling Walker.                              Time Tracking:     PT Received On: 24  PT Start Time: 0935     PT Stop Time: 1019  PT Total Time (min): 44 min     Billable Minutes: Therapeutic Activity  44    Treatment Type: Treatment  PT/PTA: PTA     Number of PTA visits since last PT visit: 1     11/11/2024

## 2024-11-11 NOTE — LETTER
..  [facility]  [address]  [address]  [place] [address]  Phone: [phone]  Fax: [phone] Date: 2024    Patient: Alexa Otero                      MRN#:[id number]  : 1940  Referring Physician: Sabino Stephenson             Procedure: EGD    No current facility-administered medications for this visit.     No current outpatient medications on file.     Facility-Administered Medications Ordered in Other Visits   Medication Dose Route Frequency Provider Last Rate Last Admin    0.9%  NaCl infusion (for blood administration)   Intravenous Q24H PRN Gila Carranza, ZION        acetaminophen tablet 650 mg  650 mg Oral Q8H PRN Jaimie Marcum MD        acetaminophen tablet 650 mg  650 mg Oral Q4H PRN Jaimie Marcum MD   650 mg at 24 1104    albuterol-ipratropium 2.5 mg-0.5 mg/3 mL nebulizer solution 3 mL  3 mL Nebulization Q6H WAKE Reid Garrett MD   3 mL at 24 0712    aluminum-magnesium hydroxide-simethicone 200-200-20 mg/5 mL suspension 30 mL  30 mL Oral QID PRN Jaimie Marcum MD   30 mL at 24 0803    amiodarone tablet 200 mg  200 mg Oral BID Jaimie Marcum MD   200 mg at 11/10/24 2032    apixaban tablet 2.5 mg  2.5 mg Oral BID Jaimie Marcum MD   2.5 mg at 11/10/24 2032    dextrose 50% injection 12.5 g  12.5 g Intravenous PRN Jaimie Marcum MD        dextrose 50% injection 25 g  25 g Intravenous PRN Jaimie Marcum MD        electrolytes-dextrose (Pedialyte) oral solution 200 mL  200 mL Oral Q4H Gila Carranza, NP   200 mL at 24 0500    folic acid tablet 1 mg  1 mg Oral Daily Jaimie Marcum MD   1 mg at 11/10/24 0849    gabapentin capsule 100 mg  100 mg Oral BID Jaimie Marcum MD   100 mg at 11/10/24 2032    glucagon (human recombinant) injection 1 mg  1 mg Intramuscular PRN Jaimie Marcum MD        glucose chewable tablet 16 g  16 g Oral PRN Jaimie Marcum MD        glucose chewable tablet 24 g   24 g Oral PRN Jaimie Marcum MD        HYDROcodone-acetaminophen 5-325 mg per tablet 1 tablet  1 tablet Oral Q6H PRN Jaimie Marcum MD   1 tablet at 11/04/24 2221    insulin aspart U-100 pen 0-10 Units  0-10 Units Subcutaneous QID (AC + HS) PRN Gila Carranza NP   2 Units at 11/10/24 2032    insulin aspart U-100 pen 5 Units  5 Units Subcutaneous TIDWM Susan Medellin NP   5 Units at 11/10/24 1655    lactulose 20 gram/30 mL solution Soln 20 g  20 g Oral Q6H PRN Neva Christian MD        LORazepam tablet 0.5 mg  0.5 mg Oral Q8H PRN Gila Carranza, NP   0.5 mg at 11/10/24 2328    magnesium oxide tablet 400 mg  400 mg Oral Daily Jaimie Marcum MD   400 mg at 11/10/24 0850    magnesium oxide tablet 800 mg  800 mg Oral PRN Jaimie Marcum MD        magnesium oxide tablet 800 mg  800 mg Oral PRN Jaimie Marcum MD        melatonin tablet 6 mg  6 mg Oral Nightly PRN Jaimie Marcum MD   6 mg at 11/05/24 2046    metoprolol succinate (TOPROL-XL) 24 hr tablet 25 mg  25 mg Oral Daily Jaimie Marcum MD   25 mg at 11/10/24 0849    naloxone 0.4 mg/mL injection 0.02 mg  0.02 mg Intravenous PRN Jaimie Marcum MD        ondansetron injection 4 mg  4 mg Intravenous Q6H PRN Jaimie Marcum MD   4 mg at 11/02/24 0838    pantoprazole EC tablet 40 mg  40 mg Oral Daily Jaimie Marcum MD   40 mg at 11/11/24 0500    polyethylene glycol packet 17 g  17 g Oral Daily Arthur Vyas MD   17 g at 11/10/24 0850    potassium bicarbonate disintegrating tablet 35 mEq  35 mEq Oral PRN Jaimie Marcum MD   35 mEq at 11/01/24 2230    potassium bicarbonate disintegrating tablet 50 mEq  50 mEq Oral PRN Jaimie Marcum MD        potassium bicarbonate disintegrating tablet 60 mEq  60 mEq Oral PRN Jaimie Marcum MD        potassium, sodium phosphates 280-160-250 mg packet 2 packet  2 packet Oral PRN Jaimie Marcum MD        potassium, sodium phosphates 280-160-250 mg packet 2 packet  2 packet Oral PRN Jaimie Marcum MD         potassium, sodium phosphates 280-160-250 mg packet 2 packet  2 packet Oral PRN Jaimie Marcum MD        predniSONE tablet 60 mg  60 mg Oral Daily Neva Christian MD        sodium chloride 0.9% flush 2 mL  2 mL Intravenous Q12H PRN Jaimie Marcum MD        sucralfate 100 mg/mL suspension 1 g  1 g Oral QID Jaimie Marcum MD   1 g at 11/10/24 2032    [START ON 11/12/2024] sulfamethoxazole-trimethoprim 800-160mg per tablet 1 tablet  1 tablet Oral Every other day Neva Christian MD        tamsulosin 24 hr capsule 0.4 mg  0.4 mg Oral Daily Susan Medellin NP   0.4 mg at 11/10/24 0850       This patient has been assessed for risk factors for clearance of surgery with the following stipulations:    [] No Contraindications.      [x] Recommendations for {Medications to Hold:74429}: Hold  3 DAYS.    [x] Patient is {Patient Risk:23993}    [] Cleared for surgery with the following restrictions:    [] Not Cleared for surgery due to the following reasons:    If you have any questions regarding the above, please contact my office at [phone]    Clearing Clinician:

## 2024-11-11 NOTE — CARE UPDATE
11/11/24 0712   Patient Assessment/Suction   Level of Consciousness (AVPU) alert   Respiratory Effort Unlabored   Expansion/Accessory Muscles/Retractions no retractions;no use of accessory muscles   All Lung Fields Breath Sounds Anterior:;Lateral:;coarse;diminished   Rhythm/Pattern, Respiratory unlabored;pattern regular   Cough Frequency infrequent   Cough Type dry;nonproductive   Skin Integrity   $ Wound Care Tech Time 15 min   Area Observed Left;Right;Behind ear;Cheek;Upper lip;Nares   Skin Appearance without discoloration   PRE-TX-O2   Device (Oxygen Therapy) high flow nasal cannula   $ Is the patient on Low Flow Oxygen? Yes   Flow (L/min) (Oxygen Therapy) 5   SpO2 (!) 93 %   Pulse Oximetry Type Intermittent   $ Pulse Oximetry - Multiple Charge Pulse Oximetry - Multiple   Pulse 87   Resp 19   Aerosol Therapy   $ Aerosol Therapy Charges Aerosol Treatment   Daily Review of Necessity (SVN) completed   Respiratory Treatment Status (SVN) given   Treatment Route (SVN) mask;oxygen   Patient Position semi-Morrell's   Post Treatment Assessment (SVN) breath sounds unchanged   Signs of Intolerance (SVN) none   Breath Sounds Post-Respiratory Treatment   Throughout All Fields Post-Treatment All Fields   Throughout All Fields Post-Treatment no change   Post-treatment Heart Rate (beats/min) 90   Post-treatment Resp Rate (breaths/min) 18   Incentive Spirometer   $ Incentive Spirometer Charges done with encouragement   Incentive Spirometer Predicted Level (mL) 1750   Administration (IS) instruction provided, follow-up   Number of Repetitions (IS) 5   Level Incentive Spirometer (mL) 500   Patient Tolerance (IS) fair;no adverse signs/symptoms present   Preset CPAP/BiPAP Settings   Mode Of Delivery BiPAP S/T;Standby   CPAP/BIPAP charged w/in last 24 h NO   Education   $ Education Bronchodilator;Incentive Spirometry;15 min

## 2024-11-11 NOTE — PT/OT/SLP PROGRESS
Occupational Therapy   Treatment    Name: Alexa Otero  MRN: [id number]  Admitting Diagnosis:  Acute on chronic hypoxic respiratory failure       Recommendations:     Discharge Recommendations: Moderate Intensity Therapy  Discharge Equipment Recommendations:  to be determined by next level of care  Barriers to discharge:   (increased physical assistance with ADLs and functional mobility.)    Assessment:     Alexa Otero is a 84 y.o. female with a medical diagnosis of Acute on chronic hypoxic respiratory failure.  She presents with general weakness. Patient participated in chair/bed transfer and bed mobility. Performance deficits affecting function are weakness, impaired endurance, impaired self care skills, impaired functional mobility, gait instability, impaired balance, decreased safety awareness, decreased lower extremity function, decreased upper extremity function, impaired cardiopulmonary response to activity.     Rehab Prognosis:  Fair; patient would benefit from acute skilled OT services to address these deficits and reach maximum level of function.       Plan:     Patient to be seen 6 x/week to address the above listed problems via self-care/home management, therapeutic activities, therapeutic exercises  Plan of Care Expires: 12/06/24  Plan of Care Reviewed with: patient    Subjective     Chief Complaint: General weakness and desating with movement  Patient/Family Comments/goals: improved functional mobility and ADL independence.  Pain/Comfort:  Pain Rating 1: 0/10  Pain Rating Post-Intervention 1: 0/10    Objective:     Communicated with: nurse prior to session.  Patient found up in chair with telemetry, peripheral IV, priest catheter, oxygen, pulse ox (continuous) upon OT entry to room.    General Precautions: Standard, fall    Orthopedic Precautions:N/A  Braces: N/A  Respiratory Status:  10L O2 via HFNC upon entering room. Respiratory Therapist came in during session and changed O2 to 30L @ 70% via  Vapotherm.     Occupational Performance:     Bed Mobility:    Patient completed Sit to Supine with moderate assistance     Functional Mobility/Transfers:  Patient completed Sit <> Stand Transfer with minimum assistance  with  hand-held assist     [facility] 6 Click ADL:      Treatment & Education:  Patient tolerated sitting up in chair for 1 hour today. Respiratory Therapist in room change patient to 30L @ 70% via Vapotherm.    Patient left HOB elevated with all lines intact, call button in reach, bed alarm on, and daughter present    GOALS:   Multidisciplinary Problems       Occupational Therapy Goals          Problem: Occupational Therapy    Goal Priority Disciplines Outcome Interventions   Occupational Therapy Goal     OT, PT/OT Progressing    Description: Goals to be met by: 12/06/2024     Patient will increase functional independence with ADLs by performing:    UE Dressing with Supervision.  LE Dressing with Supervision.  Grooming while standing at sink with Supervision.  Toileting from bedside commode with Supervision for hygiene and clothing management.   Toilet transfer to bedside commode with Supervision.                         Time Tracking:     OT Date of Treatment: 11/11/24  OT Start Time: 1117  OT Stop Time: 1133  OT Total Time (min): 16 min    Billable Minutes:Therapeutic Activity 16    OT/IZZY: OT     Number of IZZY visits since last OT visit: 1    11/11/2024

## 2024-11-11 NOTE — ASSESSMENT & PLAN NOTE
Likely secondary to her bilateral extensive pneumonia vs pneumonitis  On 4L NC at home  Currently on high flow nasal cannula and intermittent bipap  Pulm consulted   CTA chest ruled out PE.  Echo done last month showed normal systolic function.   Pneumonia vs. Pneumonitis? - pulm added prednisone initially and escalated to IV solumedrol for possible Pemetrexed induced pneumonitis   IV lasix on 11/3  Currently on 4 L  P.o. steroids changed to Solu-Medrol  Bactrim to prevent PCP

## 2024-11-11 NOTE — ASSESSMENT & PLAN NOTE
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 Most recent hemoglobin and hematocrit are listed below.  Recent Labs     11/09/24  0631 11/10/24  0837 11/11/24  0509   HGB 8.4* 8.4* 8.5*   HCT 26.7* 26.1* 27.0*       Plan  - Monitor serial CBC: Daily  - Transfuse PRBC if patient becomes hemodynamically unstable, symptomatic or H/H drops below 7/21.  - Patient has received 1 units of PRBCs on 11/4  - Patient's anemia is currently stable  - T&S in AM in the event she requires a transfusion

## 2024-11-11 NOTE — PROGRESS NOTES
[facility] Medicine  Progress Note    Patient Name: Alexa Otero  MRN: [id number]  Patient Class: IP- Inpatient   Admission Date: 11/1/2024  Length of Stay: 10 days  Attending Physician: Judy Birmingham MD  Primary Care Provider: Idalia Greco MD        Subjective:     Principal Problem:Acute on chronic hypoxic respiratory failure        HPI:  84-year-old female with a past medical history of lung cancer actively getting chemo last of which was 5 days ago, who presented to the ER because of generalized weakness.  According to the patient, she woke up today, tried to get out of bed, but felt very weak, and slid beside the bed.  Did not hit her head, and did not lose consciousness.  She however could not get up however, and her  could not assist.  911 was called.  On arrival of EMS, patient was satting 88% on 4 L, and appeared to be short of breath.  She was brought to the ER for evaluation.    In the ER, vitals showed a blood pressure of 145/65, heart rate of 103, respiratory rate of 20, afebrile satting 60% on 4 L.  Immediately patient was placed on non-rebreather.  CBC with white count of 16.8, and macrocytic anemia.  CMP showed hypokalemia of 3.4.  First troponin is elevated at 46.  Point of care Lactic acid was 2.1.  Blood cultures were collected.  EKG showed sinus rhythm.  Chest x-ray showed no significant change of bilateral diffuse mixed interstitial and airspace lung opacities.  CTA chest was done, and it was negative for PE, but showed extensive interstitial and airspace opacities throughout both lungs, having significantly worsened in the left lung compared to prior CT. Unchanged small to moderate sized bilateral pleural effusions.  Cefepime was ordered, and patient will be admitted to Medicine for further care of her severe sepsis.    Overview/Hospital Course:  Ms. Otero has been monitored closely during her hospitalization. She was admitted on 11/1/2024 with acute  on chronic hypoxemic resp failure. CTA chest reveals extensive interstitial opacities c/w multifocal pna and/or ARDS. She requires high flow nasal cannula up to 15L now on 13L. Pulm and ID have been consulted. Pulm recommends broad spectrum abx - cefepime, doxy, and vanc initially. MRSA screen negative and vanc d/c'ed. She has stage IV lung ca and completed Carbo/Pemetrexed last year and is currently on pemetrexed (Alimta) maintenance with last dose 10/28. This can cause an interstitial pneumonitis and pulm has started prednisone. Plan for bronchoscopy on Monday if no improvement and recommended continuing eliquis. BiPAP qHS and naps. Duonebs Q6h. ID has ordered a respiratory infection panel that is negative. She's had abdominal bloating and discomfort for some time and had an outpt CT abd/pelvis with gastric wall thickening that is is nonspecific could be related to gastritis. She has a history of peptic ulcers and PCP started protonix/carafate. KUB on 11/2 with nonobstructive bowel gas pattern and moderate stool burden and pt was treated with laxative suppository. She has chronic macrocytic anemia that appears at baseline. She had a leukocytosis with left shift on admission that has trended down from 16K-->12K-->8K. CRP 36, procal 2.9. Pt had a BM on 11/2, but abd remained distended. She was found to be retaining a significant amount of urine on bladder scan >900 and priest was placed with 1500mL out with resolution of abd distention. Pulm increased steriods on 11/3 to IV solumedrol, she's requiring BiPAP support and was given IV lasix. Macrocytic anemia at baseline on admission but trending down and she will receive 1 unit PRBCs 11/4. She has developed steroid induced hyperglycemia and insulin sliding titrated up to moderate dose for tighter glucose control. Priest d/c'ed on 11/5. PT/OT consulted and pt up to chair on 11/5. She desatted with movement and took some time and increased supplemental O2 to recover.  Prior to moving to the chair, O2 was weaned down to 10L high flow.    Interval History:   Patient is breathing has worsened.  She was hypoxic requiring oxygen.  She is dyspneic with speech.  Steroids changed to IV Solu-Medrol with the addition of Bactrim DS prevent PCP.  Pulmonology following. Patient is Vapotherm at this.  Review of Systems   Constitutional:  Positive for fatigue. Negative for chills and fever.   Respiratory:  Positive for shortness of breath. Negative for cough.    Cardiovascular: Negative.    Gastrointestinal: Negative.    Genitourinary: Negative.    Skin: Negative.    Neurological:  Positive for weakness.     Objective:     Vital Signs (Most Recent):  Temp: 97.5 °F (36.4 °C) (11/11/24 1148)  Pulse: 92 (11/11/24 1119)  Resp: 20 (11/11/24 1119)  BP: 129/62 (11/11/24 1119)  SpO2: 100 % (11/11/24 1205) Vital Signs (24h Range):  Temp:  [97.5 °F (36.4 °C)-98.8 °F (37.1 °C)] 97.5 °F (36.4 °C)  Pulse:  [82-92] 92  Resp:  [18-20] 20  SpO2:  [90 %-100 %] 100 %  BP: (117-154)/(50-68) 129/62     Weight: 49.5 kg (109 lb 2 oz)  Body mass index is 18.73 kg/m².    Intake/Output Summary (Last 24 hours) at 11/11/2024 1335  Last data filed at 11/11/2024 1229  Gross per 24 hour   Intake 1600 ml   Output 2350 ml   Net -750 ml         Physical Exam  Vitals and nursing note reviewed. Exam conducted with a chaperone present.   Constitutional:       Comments: Chronically ill appearing   HENT:      Head: Normocephalic and atraumatic.      Nose: Nose normal.      Mouth/Throat:      Mouth: Mucous membranes are moist.      Pharynx: Oropharynx is clear.   Eyes:      Pupils: Pupils are equal, round, and reactive to light.   Cardiovascular:      Rate and Rhythm: Normal rate.   Pulmonary:      Comments: Coarse and diminished throughout  Abdominal:      Comments: Abd is firm, mildly distended and nontender Bsx4 quadrants   Musculoskeletal:         General: Normal range of motion.      Cervical back: Normal range of motion and  neck supple.      Right lower leg: No edema.      Left lower leg: No edema.   Skin:     General: Skin is warm.      Capillary Refill: Capillary refill takes 2 to 3 seconds.      Findings: Bruising present.   Neurological:      General: No focal deficit present.      Mental Status: She is alert and oriented to person, place, and time.   Psychiatric:         Mood and Affect: Mood normal.         Behavior: Behavior normal.             Significant Labs: All pertinent labs within the past 24 hours have been reviewed.  CBC:   Recent Labs   Lab 11/10/24  0837 11/11/24  0509   WBC 8.79 12.28   HGB 8.4* 8.5*   HCT 26.1* 27.0*   PLT 78* 96*     CMP:   Recent Labs   Lab 11/10/24  0837 11/11/24  0509    138   K 4.2 4.3   CL 99 99   CO2 37* 35*   * 156*   BUN 39* 44*   CREATININE 1.4 1.3   CALCIUM 8.4* 8.6*   PROT 5.1* 5.4*   ALBUMIN 2.6* 2.6*   BILITOT 0.5 0.4   ALKPHOS 47* 47*   AST 13 13   ALT 17 16   ANIONGAP 2* 4*     Magnesium:   Recent Labs   Lab 11/10/24  0837 11/11/24  0509   MG 1.8 1.7     POCT Glucose:   Recent Labs   Lab 11/10/24  1921 11/11/24  0818 11/11/24  1112   POCTGLUCOSE 232* 147* 208*       Significant Imaging: I have reviewed all pertinent imaging results/findings within the past 24 hours.  Imaging Results              CTA Chest Non-Coronary (PE Studies) (Final result)  Result time 11/01/24 12:51:32      Final result by Davide Jeffrey MD (11/01/24 12:51:32)                   Impression:      1. Negative for pulmonary thromboembolism.  2. Extensive interstitial and airspace opacities throughout both lungs, having significantly worsened in the left lung compared to prior CT.  Multifocal pneumonia, drug reaction, and or developing ARDS could have this appearance.  3. Unchanged small to moderate sized bilateral pleural effusions.  4. Additional observations as described.  .      Electronically signed by: Davide Jeffrey  Date:    11/01/2024  Time:    12:51               Narrative:     EXAMINATION:  CTA CHEST NON CORONARY (PE STUDIES)    CLINICAL HISTORY:  Pulmonary embolism (PE) suspected, high prob; lung cancer    TECHNIQUE:  Thin axial imaging through the chest was performed with 100 mL Omnipaque 350 IV contrast, with sagittal and coronal reformatted images and MIP reconstructions performed, with images stored in the patient's permanent electronic medical record.    FINDINGS:  Comparison to multiple prior exams.  There are no pulmonary arterial filling defects to suggest pulmonary thromboembolism.  The central pulmonary arteries are normal in caliber.    Diffuse calcified plaque involves normal-caliber thoracic aorta, with the heart enlarged and no pericardial effusion.  There are extensive coronary arterial calcifications, with dense mitral annular calcifications.  No enlarged mediastinal or hilar lymph nodes.    There is unchanged volume loss in the right hemithorax, with extensive interstitial and airspace opacities throughout both lungs, in the left lung having significantly worsened compared to the 10/07/2024.  There are scattered lucencies reflecting pulmonary emphysema.  Small to moderate sized low-density bilateral pleural effusions are unchanged, with the central airways patent.  No pneumothorax.    Images of the upper abdomen show stable hepatic hypodensity, cholecystectomy clips, and scattered colon diverticula.  There are no acute fractures or destructive osseous lesions.                                       X-Ray Chest AP Portable (Final result)  Result time 11/01/24 10:39:57      Final result by Kp Leiva DO (11/01/24 10:39:57)                   Impression:      No significant change of bilateral diffuse mixed interstitial and airspace lung opacities.      Electronically signed by: Kp Leiva  Date:    11/01/2024  Time:    10:39               Narrative:    EXAMINATION:  XR CHEST AP PORTABLE    CLINICAL HISTORY:  Sepsis;    FINDINGS:  Portable chest with comparison  chest x-ray 10/10/2024.  Normal cardiomediastinal silhouette.Bilateral diffuse mixed interstitial and airspace lung opacities are unchanged from previous exam.  Right subclavian Port-A-Cath is unchanged.  Pulmonary vasculature is normal. No acute osseous abnormality.                                       Assessment/Plan:      * Acute on chronic hypoxic respiratory failure  Likely secondary to her bilateral extensive pneumonia vs pneumonitis  On 4L NC at home  Currently on high flow nasal cannula and intermittent bipap  Pulm consulted   CTA chest ruled out PE.  Echo done last month showed normal systolic function.   Pneumonia vs. Pneumonitis? - pulm added prednisone initially and escalated to IV solumedrol for possible Pemetrexed induced pneumonitis   IV lasix on 11/3  Currently on 4 L  P.o. steroids changed to Solu-Medrol  Bactrim to prevent PCP      Urinary retention  Constipation likely contributing  She had a couple of Bms and started bowel regimen  Priest placed for significant retention   D/C priest 11/5 and had to be reinserted on 11/6  Flomax initiated  Urology consulted:  Recommended follow up outpatient.  Keep Priest in place for 5-7 days before voiding trial.  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 Most recent hemoglobin and hematocrit are listed below.  Recent Labs     11/09/24  0631 11/10/24  0837 11/11/24  0509   HGB 8.4* 8.4* 8.5*   HCT 26.7* 26.1* 27.0*       Plan  - Monitor serial CBC: Daily  - Transfuse PRBC if patient becomes hemodynamically unstable, symptomatic or H/H drops below 7/21.  - Patient has received 1 units of PRBCs on 11/4  - Patient's anemia is currently stable  - T&S in AM in the event she requires a transfusion    Pneumonitis  Patient was started on cefepime, doxy for atypicals, and Vanc, vanc d/c'ed with negative MRSA screen  Pulmonary and Infectious Disease following  Steroids increased by pulmonology   Antibiotic therapy  complete    Antibiotics (From admission, onward)      Start     Stop Route Frequency Ordered    11/12/24 0900  sulfamethoxazole-trimethoprim 800-160mg per tablet 1 tablet         -- Oral Every other day 11/11/24 0839    11/01/24 1300  mupirocin 2 % ointment         11/06/24 0859 Nasl 2 times daily 11/01/24 1200            Microbiology Results (last 7 days)       Procedure Component Value Units Date/Time    Blood culture x two cultures. Draw prior to antibiotics. [[id number]] Collected: 11/01/24 1045    Order Status: Completed Specimen: Blood from Peripheral, Antecubital, Left Updated: 11/06/24 1032     Blood Culture, Routine No growth after 5 days.    Narrative:      Aerobic and anaerobic    Blood culture x two cultures. Draw prior to antibiotics. [[id number]] Collected: 11/01/24 1051    Order Status: Completed Specimen: Blood from Peripheral, Antecubital, Right Updated: 11/06/24 1032     Blood Culture, Routine No growth after 5 days.    Narrative:      Aerobic and anaerobic            Pleural effusion  Bilateral, stable.   Pulm consulted  In the setting of lung ca      Malignant neoplasm of lower lobe of right lung  Carbo/Pemetrexed until 4/2024 now on Pemetrexed maintenance with last dose 10/28/24  Consult pt's oncologist, Dr. Cassidy following      Atrial fibrillation  Patient has paroxysmal (<7 days) atrial fibrillation. Patient is currently in sinus rhythm. [id number] Score: 5. The patients heart rate in the last 24 hours is as follows:  Pulse  Min: 69  Max: 78     Antiarrhythmics  Amiodarone and metoprolol    Anticoagulants  Eliquis    Plan  - Replete lytes with a goal of K>4, Mg >2  - Patient is anticoagulated, see medications listed above.  - Patient's afib is currently controlled      VTE Risk Mitigation (From admission, onward)           Ordered     apixaban tablet 2.5 mg  2 times daily         11/01/24 1316     IP VTE HIGH RISK PATIENT  Once         11/01/24 1158     Place sequential compression device   Until discontinued         11/01/24 1158                    Discharge Planning   ALYCIA: 11/12/2024     Code Status: DNR   Is the patient medically ready for discharge?:     Reason for patient still in hospital (select all that apply): Patient unstable, Patient new problem, Patient trending condition, Treatment, Consult recommendations, and PT / OT recommendations  Discharge Plan A: Skilled Nursing Facility   Discharge Delays: None known at this time              Susan Medellin NP  Department of Hospital Medicine   [facility]

## 2024-11-11 NOTE — PROGRESS NOTES
Pulmonary/Critical Care Progress Note      PATIENT NAME: Alexa Otero  MRN: [id number]  TODAY'S DATE: 2024  11:55 AM  ADMIT DATE: 2024  AGE: 84 y.o. 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    She reports progressive fatigue weakness prior to admission to the hospital.  She reports that she slid out of her bed and that is the reason she came to the hospital.  She reports that she has not had any fever cough night sweats, but she has some worsening shortness of breath.    11/3   - rough night, had worsening shortness of breath and is required non-rebreather in addition no nasal cannula currently she is on high-flow at 10 liters/minute     the patient is feeling a little better today.  She is on 15 L high-flow nasal cannula.  Her CT shows her chronically infiltrated right lung with the surrounding effusion.  Her left lung which was normal looking 2 weeks ago now is diffusely infiltrated with ground-glass opacities.  This must be the Alimta.     the patient is improving with the steroids.  She looks much brighter today.  She is feeling better.  She is on 10 L high-flow nasal cannula.     the patient is very sleepy today.  The nurse reports she was up earlier and ate breakfast and sat up.  Her oxygen is set on 10 L.  Her sats remain 99% on 5 L. I have asked the nurse to try to wean this.     the patient is looking good today.  She is down to 3 L nasal cannula.  Her chest x-ray is improving but her left thorax is not yet normal. We need to get her priest out and her walking.     the patient is too  weak to participate with PT she says.  She also states that it makes her very short of breath.  Her saturations are difficult to .  Urology would like to leave the Rausch in.  Obviously, the patient is going to need to go to a nursing home from here.  She states she will be here over the weekend.    11/9 the patient states physical therapy has not yet come to see her today.  States she is feeling good from the waist up.  She is on 4 L nasal cannula.    11/10 the patient was not seen by physical therapy yesterday.  This is a shame.  She is concerned about still having a Rausch catheter.  Explained that Dr. Jon isn't coming to the hospital to see her, but she needs to get to him to be evaluated. The patient's oxygen requirements are higher again today.  Will increase her prednisone to 60 mg and see if we can not stabilize this.      11/11 .  The patient's breathing is not as good.  She is dyspneic with speech.  Will resume Solu-Medrol.  Have added Bactrim DS q.o.d. for PCP prevention.    REVIEW OF SYSTEMS  General: Feeling weak  Eyes: Vision is good.  ENT:  No sinusitis or pharyngitis.   Heart: No chest pain or palpitations.  Lungs:  Feeling more short of breath.  GI: No Nausea, vomiting, constipation, diarrhea, or reflux.  : No dysuria, hesitancy, or nocturia.  Skin: No lesions or rashes.  Musculoskeletal:  Very weak..  Neuro: No headaches or neuropathy.  Lymph: No edema or adenopathy.  Psych: No anxiety or depression.  Endo: No weight change.        No change in the patient's Past Medical History, Past Surgical History, Social History or Family History since admission.    VITAL SIGNS (MOST RECENT)  Temp: 98.8 °F (37.1 °C) (11/11/24 0807)  Pulse: 87 (11/11/24 0712)  Resp: 19 (11/11/24 0712)  BP: (!) 117/50 (11/11/24 0807)  SpO2: (!) 93 % (11/11/24 0712)    INTAKE AND OUTPUT (LAST 24 HOURS):  Intake/Output Summary (Last 24 hours) at 11/11/2024 0836  Last data filed at 11/11/2024 0500  Gross per 24 hour    Intake 1080 ml   Output 2600 ml   Net -1520 ml       WEIGHT  Wt Readings from Last 1 Encounters:   11/01/24 49.5 kg (109 lb 2 oz)         PHYSICAL EXAM  GENERAL: Older patient in no distress, looking frail.  HEENT: Pupils equal and reactive. Extraocular movements intact. Nose intact.  Pharynx moist.  NECK: Supple.   HEART: Regular rate and rhythm. No murmur or gallop auscultated.  LUNGS:  There are diminished breath sounds on the right.  On the left there are diffuse crackles.  There is a rub on the left as well.  Lung excursion symmetrical. No change in fremitus.  There is a port in thorax which is accessed.  ABDOMEN: Bowel sounds present. Non-tender, no masses palpated.  : Normal anatomy. Rausch with clear urine.  EXTREMITIES: Normal muscle tone and joint movement, no cyanosis or clubbing.  She is very weak.  LYMPHATICS: No adenopathy palpated, no edema.  SKIN: Dry, intact, no lesions.  There is a stage I decubitus on the buttock.    NEURO: Cranial nerves II-XII intact. Motor strength 5/5 bilaterally, upper and lower extremities.  PSYCH: Appropriate affect.        CBC LAST (LAST 24 HOURS)  Recent Labs   Lab 11/11/24  0509   WBC 12.28   RBC 2.65*   HGB 8.5*   HCT 27.0*   *   MCH 32.1*   MCHC 31.5*   RDW 18.1*   PLT 96*   MPV 10.2   GRAN 82.8*  10.2*   LYMPH 4.9*  0.6*   MONO 10.8  1.3*   BASO 0.01   NRBC 0       CHEMISTRY LAST (LAST 24 HOURS)  Recent Labs   Lab 11/11/24  0509      K 4.3   CL 99   CO2 35*   ANIONGAP 4*   BUN 44*   CREATININE 1.3   *   CALCIUM 8.6*   MG 1.7   ALBUMIN 2.6*   PROT 5.4*   ALKPHOS 47*   ALT 16   AST 13   BILITOT 0.4         LAST 7 DAYS MICROBIOLOGY   Microbiology Results (last 7 days)       Procedure Component Value Units Date/Time    Blood culture x two cultures. Draw prior to antibiotics. [[id number]] Collected: 11/01/24 1045    Order Status: Completed Specimen: Blood from Peripheral, Antecubital, Left Updated: 11/06/24 1032     Blood Culture, Routine No  growth after 5 days.    Narrative:      Aerobic and anaerobic    Blood culture x two cultures. Draw prior to antibiotics. [[id number]] Collected: 11/01/24 1051    Order Status: Completed Specimen: Blood from Peripheral, Antecubital, Right Updated: 11/06/24 1032     Blood Culture, Routine No growth after 5 days.    Narrative:      Aerobic and anaerobic            MOST RECENT IMAGING  X-Ray Chest 1 View  Narrative: EXAMINATION:  XR CHEST 1 VIEW    CLINICAL HISTORY:  Pneumonitis;    COMPARISON:  November 7, 2024    FINDINGS:  Heart size is normal.  The mediastinum is unremarkable.  Right-sided Port-A-Cath is unchanged in position.    Diffuse bilateral abnormal interstitial prominence and faint ground-glass opacities are unchanged.  Apical opacity on the right is shown on previous CT to be related to loculated pleural fluid, stable.  Blunting of the lateral costophrenic angles also indicate small amounts of pleural fluid.  There is no pneumothorax.  Impression: 1. No significant radiographic change compared to November 7.    Electronically signed by: Washington Johnson  Date:    11/10/2024  Time:    11:55      CURRENT VISIT EKG  Results for orders placed or performed during the hospital encounter of 11/01/24   EKG 12-lead   Result Value Ref Range    QRS Duration 56 ms    OHS QTC Calculation 482 ms    Narrative    Test Reason : R06.02,    Vent. Rate : 097 BPM     Atrial Rate : 097 BPM     P-R Int : 156 ms          QRS Dur : 056 ms      QT Int : 380 ms       P-R-T Axes : 044 -06 100 degrees     QTc Int : 482 ms    Normal sinus rhythm  Low voltage QRS  Septal infarct (cited on or before 13-OCT-2023)  Abnormal ECG  When compared with ECG of 25-OCT-2024 10:48,  ST now depressed in Lateral leads    Referred By: [email or web address]   SELF           Confirmed By:        ECHOCARDIOGRAM RESULTS  Results for orders placed during the hospital encounter of 10/07/24    Echo    Interpretation Summary    Left Ventricle: The left ventricle is  normal in size. Mildly increased wall thickness. There is mild concentric hypertrophy. Moderate global hypokinesis present. There is mildly reduced systolic function with a visually estimated ejection fraction of 40 - 45%. There is normal diastolic function.    Right Ventricle: Normal right ventricular cavity size. Wall thickness is normal. Systolic function is normal.    Left Atrium: Left atrium is moderately dilated.    Mitral Valve: There is bileaflet sclerosis. There is moderate mitral annular calcification present. There is moderate stenosis. The mean pressure gradient across the mitral valve is 8 mmHg at a heart rate of  bpm. There is mild regurgitation with a centrally directed jet.    Pulmonary Artery: The estimated pulmonary artery systolic pressure is 29 mmHg.    IVC/SVC: Normal venous pressure at 3 mmHg.        Oxygen INFORMATION       5 L           PLAN:.      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

## 2024-11-11 NOTE — SUBJECTIVE & OBJECTIVE
Interval History:   Patient is breathing has worsened.  She was hypoxic requiring oxygen.  She is dyspneic with speech.  Steroids changed to IV Solu-Medrol with the addition of Bactrim DS prevent PCP.  Pulmonology following. Patient is Vapotherm at this.  Review of Systems   Constitutional:  Positive for fatigue. Negative for chills and fever.   Respiratory:  Positive for shortness of breath. Negative for cough.    Cardiovascular: Negative.    Gastrointestinal: Negative.    Genitourinary: Negative.    Skin: Negative.    Neurological:  Positive for weakness.     Objective:     Vital Signs (Most Recent):  Temp: 97.5 °F (36.4 °C) (11/11/24 1148)  Pulse: 92 (11/11/24 1119)  Resp: 20 (11/11/24 1119)  BP: 129/62 (11/11/24 1119)  SpO2: 100 % (11/11/24 1205) Vital Signs (24h Range):  Temp:  [97.5 °F (36.4 °C)-98.8 °F (37.1 °C)] 97.5 °F (36.4 °C)  Pulse:  [82-92] 92  Resp:  [18-20] 20  SpO2:  [90 %-100 %] 100 %  BP: (117-154)/(50-68) 129/62     Weight: 49.5 kg (109 lb 2 oz)  Body mass index is 18.73 kg/m².    Intake/Output Summary (Last 24 hours) at 11/11/2024 1335  Last data filed at 11/11/2024 1229  Gross per 24 hour   Intake 1600 ml   Output 2350 ml   Net -750 ml         Physical Exam  Vitals and nursing note reviewed. Exam conducted with a chaperone present.   Constitutional:       Comments: Chronically ill appearing   HENT:      Head: Normocephalic and atraumatic.      Nose: Nose normal.      Mouth/Throat:      Mouth: Mucous membranes are moist.      Pharynx: Oropharynx is clear.   Eyes:      Pupils: Pupils are equal, round, and reactive to light.   Cardiovascular:      Rate and Rhythm: Normal rate.   Pulmonary:      Comments: Coarse and diminished throughout  Abdominal:      Comments: Abd is firm, mildly distended and nontender Bsx4 quadrants   Musculoskeletal:         General: Normal range of motion.      Cervical back: Normal range of motion and neck supple.      Right lower leg: No edema.      Left lower leg: No  edema.   Skin:     General: Skin is warm.      Capillary Refill: Capillary refill takes 2 to 3 seconds.      Findings: Bruising present.   Neurological:      General: No focal deficit present.      Mental Status: She is alert and oriented to person, place, and time.   Psychiatric:         Mood and Affect: Mood normal.         Behavior: Behavior normal.             Significant Labs: All pertinent labs within the past 24 hours have been reviewed.  CBC:   Recent Labs   Lab 11/10/24  0837 11/11/24  0509   WBC 8.79 12.28   HGB 8.4* 8.5*   HCT 26.1* 27.0*   PLT 78* 96*     CMP:   Recent Labs   Lab 11/10/24  0837 11/11/24  0509    138   K 4.2 4.3   CL 99 99   CO2 37* 35*   * 156*   BUN 39* 44*   CREATININE 1.4 1.3   CALCIUM 8.4* 8.6*   PROT 5.1* 5.4*   ALBUMIN 2.6* 2.6*   BILITOT 0.5 0.4   ALKPHOS 47* 47*   AST 13 13   ALT 17 16   ANIONGAP 2* 4*     Magnesium:   Recent Labs   Lab 11/10/24  0837 11/11/24  0509   MG 1.8 1.7     POCT Glucose:   Recent Labs   Lab 11/10/24  1921 11/11/24  0818 11/11/24  1112   POCTGLUCOSE 232* 147* 208*       Significant Imaging: I have reviewed all pertinent imaging results/findings within the past 24 hours.  Imaging Results              CTA Chest Non-Coronary (PE Studies) (Final result)  Result time 11/01/24 12:51:32      Final result by Davide Jeffrey MD (11/01/24 12:51:32)                   Impression:      1. Negative for pulmonary thromboembolism.  2. Extensive interstitial and airspace opacities throughout both lungs, having significantly worsened in the left lung compared to prior CT.  Multifocal pneumonia, drug reaction, and or developing ARDS could have this appearance.  3. Unchanged small to moderate sized bilateral pleural effusions.  4. Additional observations as described.  .      Electronically signed by: Davide Jeffrey  Date:    11/01/2024  Time:    12:51               Narrative:    EXAMINATION:  CTA CHEST NON CORONARY (PE STUDIES)    CLINICAL HISTORY:  Pulmonary  embolism (PE) suspected, high prob; lung cancer    TECHNIQUE:  Thin axial imaging through the chest was performed with 100 mL Omnipaque 350 IV contrast, with sagittal and coronal reformatted images and MIP reconstructions performed, with images stored in the patient's permanent electronic medical record.    FINDINGS:  Comparison to multiple prior exams.  There are no pulmonary arterial filling defects to suggest pulmonary thromboembolism.  The central pulmonary arteries are normal in caliber.    Diffuse calcified plaque involves normal-caliber thoracic aorta, with the heart enlarged and no pericardial effusion.  There are extensive coronary arterial calcifications, with dense mitral annular calcifications.  No enlarged mediastinal or hilar lymph nodes.    There is unchanged volume loss in the right hemithorax, with extensive interstitial and airspace opacities throughout both lungs, in the left lung having significantly worsened compared to the 10/07/2024.  There are scattered lucencies reflecting pulmonary emphysema.  Small to moderate sized low-density bilateral pleural effusions are unchanged, with the central airways patent.  No pneumothorax.    Images of the upper abdomen show stable hepatic hypodensity, cholecystectomy clips, and scattered colon diverticula.  There are no acute fractures or destructive osseous lesions.                                       X-Ray Chest AP Portable (Final result)  Result time 11/01/24 10:39:57      Final result by Kp Leiva DO (11/01/24 10:39:57)                   Impression:      No significant change of bilateral diffuse mixed interstitial and airspace lung opacities.      Electronically signed by: Kp Leiva  Date:    11/01/2024  Time:    10:39               Narrative:    EXAMINATION:  XR CHEST AP PORTABLE    CLINICAL HISTORY:  Sepsis;    FINDINGS:  Portable chest with comparison chest x-ray 10/10/2024.  Normal cardiomediastinal silhouette.Bilateral diffuse mixed  interstitial and airspace lung opacities are unchanged from previous exam.  Right subclavian Port-A-Cath is unchanged.  Pulmonary vasculature is normal. No acute osseous abnormality.

## 2024-11-11 NOTE — TELEPHONE ENCOUNTER
11/11/24 patient requesting sx cl  for a EGD with Dr. Sabino Moody on 12/9/24 will send request to provider for approval

## 2024-11-11 NOTE — LETTER
.  [facility]  [address]  [address]  [place] [address]  Phone: [phone]  Fax: [phone] Date: 2024    Patient: Alexa Otero                      MRN#:[id number]  : 1940  Referring Physician: Sabino Stephenson             Procedure: EGD    No current facility-administered medications for this visit.     No current outpatient medications on file.     Facility-Administered Medications Ordered in Other Visits   Medication Dose Route Frequency Provider Last Rate Last Admin    0.9%  NaCl infusion (for blood administration)   Intravenous Q24H PRN Gila Carranza, ZION        acetaminophen tablet 650 mg  650 mg Oral Q8H PRN Jaimie Marcum MD        acetaminophen tablet 650 mg  650 mg Oral Q4H PRN Jaimie Marcum MD   650 mg at 24 1104    albuterol-ipratropium 2.5 mg-0.5 mg/3 mL nebulizer solution 3 mL  3 mL Nebulization Q6H WAKE Reid Garrett MD   3 mL at 24 0712    aluminum-magnesium hydroxide-simethicone 200-200-20 mg/5 mL suspension 30 mL  30 mL Oral QID PRN Jaimie Marcum MD   30 mL at 24 0803    amiodarone tablet 200 mg  200 mg Oral BID Jaimie aMrcum MD   200 mg at 24 1008    apixaban tablet 2.5 mg  2.5 mg Oral BID Jaimie Marcum MD   2.5 mg at 24 1008    dextrose 50% injection 12.5 g  12.5 g Intravenous PRN Jaimie Marcum MD        dextrose 50% injection 25 g  25 g Intravenous PRN Jaimie Marcum MD        electrolytes-dextrose (Pedialyte) oral solution 200 mL  200 mL Oral Q4H Gila Carranza, NP   200 mL at 24 1011    folic acid tablet 1 mg  1 mg Oral Daily Jaimie Marcum MD   1 mg at 24 1009    gabapentin capsule 100 mg  100 mg Oral BID Jaimie Marcum MD   100 mg at 24 1009    glucagon (human recombinant) injection 1 mg  1 mg Intramuscular PRN Jaimie Marcum MD        glucose chewable tablet 16 g  16 g Oral PRN Jaimie Marcum MD        glucose chewable tablet 24 g  24 g  Oral PRN Jaimie Marcum MD        HYDROcodone-acetaminophen 5-325 mg per tablet 1 tablet  1 tablet Oral Q6H PRN Jaimie Marcum MD   1 tablet at 11/04/24 2221    insulin aspart U-100 pen 0-10 Units  0-10 Units Subcutaneous QID (AC + HS) PRN Gila Carranza NP   2 Units at 11/10/24 2032    insulin aspart U-100 pen 5 Units  5 Units Subcutaneous TIDWM Susan Medellin NP   5 Units at 11/10/24 1655    lactulose 20 gram/30 mL solution Soln 20 g  20 g Oral Q6H PRN Neva Christian MD        LORazepam tablet 0.5 mg  0.5 mg Oral Q8H PRN Gila Carranza NP   0.5 mg at 11/10/24 2328    magnesium oxide tablet 400 mg  400 mg Oral Daily Jaimie Marcum MD   400 mg at 11/11/24 1009    magnesium oxide tablet 800 mg  800 mg Oral PRN Jaimie Marcum MD        magnesium oxide tablet 800 mg  800 mg Oral PRN Jaimie Marcum MD        melatonin tablet 6 mg  6 mg Oral Nightly PRN Jaimie Marcum MD   6 mg at 11/05/24 2046    metoprolol succinate (TOPROL-XL) 24 hr tablet 25 mg  25 mg Oral Daily Jaimie Marcum MD   25 mg at 11/11/24 1008    naloxone 0.4 mg/mL injection 0.02 mg  0.02 mg Intravenous PRN Jaimie Marcum MD        ondansetron injection 4 mg  4 mg Intravenous Q6H PRN Jaimie Marcum MD   4 mg at 11/02/24 0838    pantoprazole EC tablet 40 mg  40 mg Oral Daily Jaimie Marcum MD   40 mg at 11/11/24 0500    polyethylene glycol packet 17 g  17 g Oral Daily Arthur Vyas MD   17 g at 11/11/24 1009    potassium bicarbonate disintegrating tablet 35 mEq  35 mEq Oral PRN Jaimie Marcum MD   35 mEq at 11/01/24 2230    potassium bicarbonate disintegrating tablet 50 mEq  50 mEq Oral PRN Jaimie Marcum MD        potassium bicarbonate disintegrating tablet 60 mEq  60 mEq Oral PRN Jaimie Marcum MD        potassium, sodium phosphates 280-160-250 mg packet 2 packet  2 packet Oral PRN Jaimie Marcum MD        potassium, sodium phosphates 280-160-250 mg packet 2 packet  2 packet Oral PRN Jaimie Marcum MD        potassium,  sodium phosphates 280-160-250 mg packet 2 packet  2 packet Oral PRN Jaimie Marcum MD        predniSONE tablet 60 mg  60 mg Oral Daily Neva Christian MD   60 mg at 11/11/24 1009    sodium chloride 0.9% flush 2 mL  2 mL Intravenous Q12H PRN Jaimie Marcum MD        sucralfate 100 mg/mL suspension 1 g  1 g Oral QID Jaimie Marcum MD   1 g at 11/11/24 1009    [START ON 11/12/2024] sulfamethoxazole-trimethoprim 800-160mg per tablet 1 tablet  1 tablet Oral Every other day Neva Christian MD        tamsulosin 24 hr capsule 0.4 mg  0.4 mg Oral Daily Susan Medellin NP   0.4 mg at 11/11/24 1009       This patient has been assessed for risk factors for clearance of surgery with the following stipulations:    [x] No Contraindications.      [x] Recommendations for ELIQUIS: Hold X 3 DAYS.    [x] Patient is MODERATE RISK    [x] Cleared for surgery     [] Not Cleared for surgery due to the following reasons:    If you have any questions regarding the above, please contact my office at [phone]    Clearing Clinician:            SHYAM LANIER NP

## 2024-11-12 NOTE — PROGRESS NOTES
Pulmonary/Critical Care Progress Note      PATIENT NAME: Alexa Otero  MRN: [id number]  TODAY'S DATE: 2024  11:55 AM  ADMIT DATE: 2024  AGE: 84 y.o. 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    She reports progressive fatigue weakness prior to admission to the hospital.  She reports that she slid out of her bed and that is the reason she came to the hospital.  She reports that she has not had any fever cough night sweats, but she has some worsening shortness of breath.    11/3   - rough night, had worsening shortness of breath and is required non-rebreather in addition no nasal cannula currently she is on high-flow at 10 liters/minute     the patient is feeling a little better today.  She is on 15 L high-flow nasal cannula.  Her CT shows her chronically infiltrated right lung with the surrounding effusion.  Her left lung which was normal looking 2 weeks ago now is diffusely infiltrated with ground-glass opacities.  This must be the Alimta.     the patient is improving with the steroids.  She looks much brighter today.  She is feeling better.  She is on 10 L high-flow nasal cannula.     the patient is very sleepy today.  The nurse reports she was up earlier and ate breakfast and sat up.  Her oxygen is set on 10 L.  Her sats remain 99% on 5 L. I have asked the nurse to try to wean this.     the patient is looking good today.  She is down to 3 L nasal cannula.  Her chest x-ray is improving but her left thorax is not yet normal. We need to get her priest out and her walking.     the patient is too  weak to participate with PT she says.  She also states that it makes her very short of breath.  Her saturations are difficult to .  Urology would like to leave the Rausch in.  Obviously, the patient is going to need to go to a nursing home from here.  She states she will be here over the weekend.    11/9 the patient states physical therapy has not yet come to see her today.  States she is feeling good from the waist up.  She is on 4 L nasal cannula.    11/10 the patient was not seen by physical therapy yesterday.  This is a shame.  She is concerned about still having a Rausch catheter.  Explained that Dr. Jon isn't coming to the hospital to see her, but she needs to get to him to be evaluated. The patient's oxygen requirements are higher again today.  Will increase her prednisone to 60 mg and see if we can not stabilize this.      11/11 .  The patient's breathing is not as good.  She is dyspneic with speech.  Will resume Solu-Medrol.  Have added Bactrim DS q.o.d. for PCP prevention.    11/12 The patient is now requiring nearly maximum vapotherm to oxygenate.  Her CXR is not appreciably changed.  Have pushed her back to 60 q 6 of solumedrol. Discussed with Dr. Cassidy the utility of bronchoscopy.  Given her compromised state she would need to be intubated and I don't know if I could get her extubated. Unfortunately, all we have to offer is steroids and will see if she turns around again.     REVIEW OF SYSTEMS  General: Feeling weak  Eyes: Vision is good.  ENT:  No sinusitis or pharyngitis.   Heart: No chest pain or palpitations.  Lungs:  Feeling more short of breath.  GI: No Nausea, vomiting, constipation, diarrhea, or reflux.  : No dysuria, hesitancy, or nocturia.  Skin: No lesions or rashes.  Musculoskeletal:  Very weak..  Neuro: No headaches or neuropathy.  Lymph: No edema or adenopathy.  Psych: No anxiety or depression.  Endo: No weight change.        No change in the patient's Past Medical History,  Past Surgical History, Social History or Family History since admission.    VITAL SIGNS (MOST RECENT)  Temp: 97.5 °F (36.4 °C) (11/12/24 0725)  Pulse: 78 (11/12/24 0725)  Resp: 18 (11/12/24 0711)  BP: 119/60 (11/12/24 0725)  SpO2: (!) 89 % (11/12/24 0725)    INTAKE AND OUTPUT (LAST 24 HOURS):  Intake/Output Summary (Last 24 hours) at 11/12/2024 1001  Last data filed at 11/12/2024 0859  Gross per 24 hour   Intake 2250 ml   Output 950 ml   Net 1300 ml       WEIGHT  Wt Readings from Last 1 Encounters:   11/01/24 49.5 kg (109 lb 2 oz)         PHYSICAL EXAM  GENERAL: Older patient in no distress, looking frail.  HEENT: Pupils equal and reactive. Extraocular movements intact. Nose intact.  Pharynx moist.  NECK: Supple.   HEART: Regular rate and rhythm. No murmur or gallop auscultated.  LUNGS:  There are diminished breath sounds on the right.  On the left there are diffuse crackles anteriorly and posteriorly. Lung excursion symmetrical. No change in fremitus.  There is a port in thorax which is accessed.  ABDOMEN: Bowel sounds present. Non-tender, no masses palpated.  : Normal anatomy. Rausch with clear urine.  EXTREMITIES: Normal muscle tone and joint movement, no cyanosis or clubbing.  She is very weak.  LYMPHATICS: No adenopathy palpated, no edema.  SKIN: Dry, intact, no lesions.  There is a stage I decubitus on the buttock.    NEURO: Cranial nerves II-XII intact. Motor strength 5/5 bilaterally, upper and lower extremities.  PSYCH: Appropriate affect.        CBC LAST (LAST 24 HOURS)  Recent Labs   Lab 11/12/24  0511   WBC 10.84   RBC 2.43*   HGB 7.9*   HCT 24.9*   *   MCH 32.5*   MCHC 31.7*   RDW 17.9*   *   MPV 11.0   GRAN 95.6*  10.4*   LYMPH 1.3*  0.1*   MONO 2.2*  0.2*   BASO 0.00   NRBC 0       CHEMISTRY LAST (LAST 24 HOURS)  Recent Labs   Lab 11/12/24  0511      K 4.8   CL 97   CO2 33*   ANIONGAP 6*   BUN 48*   CREATININE 1.3   *   CALCIUM 8.6*   MG 1.7   ALBUMIN 2.6*   PROT 5.3*    ALKPHOS 45*   ALT 15   AST 10   BILITOT 0.3         LAST 7 DAYS MICROBIOLOGY   Microbiology Results (last 7 days)       Procedure Component Value Units Date/Time    Blood culture x two cultures. Draw prior to antibiotics. [[id number]] Collected: 11/01/24 1045    Order Status: Completed Specimen: Blood from Peripheral, Antecubital, Left Updated: 11/06/24 1032     Blood Culture, Routine No growth after 5 days.    Narrative:      Aerobic and anaerobic    Blood culture x two cultures. Draw prior to antibiotics. [[id number]] Collected: 11/01/24 1051    Order Status: Completed Specimen: Blood from Peripheral, Antecubital, Right Updated: 11/06/24 1032     Blood Culture, Routine No growth after 5 days.    Narrative:      Aerobic and anaerobic            MOST RECENT IMAGING  X-Ray Chest AP Portable  Narrative: EXAMINATION:  XR CHEST AP PORTABLE    CLINICAL HISTORY:  pneumonitis;    FINDINGS:  Portable chest radiograph at 06:00 hours compared to numerous prior exams including 11/10/2024 shows no significant interval change.  Impression: No significant interval change.    Electronically signed by: Davide Jeffrey  Date:    11/11/2024  Time:    09:04      CURRENT VISIT EKG  Results for orders placed or performed during the hospital encounter of 11/01/24   EKG 12-lead   Result Value Ref Range    QRS Duration 56 ms    [facility] QTC Calculation 482 ms    Narrative    Test Reason : R06.02,    Vent. Rate : 097 BPM     Atrial Rate : 097 BPM     P-R Int : 156 ms          QRS Dur : 056 ms      QT Int : 380 ms       P-R-T Axes : 044 -06 100 degrees     QTc Int : 482 ms    Normal sinus rhythm  Low voltage QRS  Septal infarct (cited on or before 13-OCT-2023)  Abnormal ECG  When compared with ECG of 25-OCT-2024 10:48,  ST now depressed in Lateral leads    Referred By: [email or web address]   SELF           Confirmed By:        ECHOCARDIOGRAM RESULTS  Results for orders placed during the hospital encounter of 10/07/24    Echo    Interpretation Summary    Left  Ventricle: The left ventricle is normal in size. Mildly increased wall thickness. There is mild concentric hypertrophy. Moderate global hypokinesis present. There is mildly reduced systolic function with a visually estimated ejection fraction of 40 - 45%. There is normal diastolic function.    Right Ventricle: Normal right ventricular cavity size. Wall thickness is normal. Systolic function is normal.    Left Atrium: Left atrium is moderately dilated.    Mitral Valve: There is bileaflet sclerosis. There is moderate mitral annular calcification present. There is moderate stenosis. The mean pressure gradient across the mitral valve is 8 mmHg at a heart rate of  bpm. There is mild regurgitation with a centrally directed jet.    Pulmonary Artery: The estimated pulmonary artery systolic pressure is 29 mmHg.    IVC/SVC: Normal venous pressure at 3 mmHg.        Oxygen INFORMATION  Oxygen Concentration (%):  [] 100   Vapotherms 35l/min and 90%           PLAN:.      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  If not, it will be time for hospice.        Neva Christian MD  Date of Service: 11/12/2024  11:55 AM

## 2024-11-12 NOTE — PROGRESS NOTES
[facility]  Adult Nutrition   Progress Note (Initial Assessment)     SUMMARY     Recommendations  1. Add Ensure chocolate daily.  2. Encourage pt to eat well at meals.   3. Consider appetite stimulant.   4. Add MVI daily    Goals: Pt to consume > 50% of meals.  Nutrition Goal Status: new    Nutrition Diagnosis PES Statement: Severe Protein Calorie Malnutrition in context of chronic illness related to acute on chronic hypoxic respiratory failure as evidenced by loss of appetite and intake for >1 year; moderate fat and muscle losses.     Dietitian Rounds Brief  Pt admitted with acute hypoxic respiratory failure. Visited with patient and  in room today. Reports appetite and intake had been down in the past year but has been improving in past month. Reports # 1 year ago. Chart review shows 13% wt loss of 17# in past year. NKFA. Denies any issues with meal, N/V. Pt likes Ensure chocolate. LBM 11/11. Hypoxic requiring oxygen. Dyspneic with speech. No edema.    Nutrition Related Social Determinants of Health: SDOH: Adequate food in home environment    Malnutrition Assessment  Malnutrition Context: chronic illness  Malnutrition Level: severe    Weight Loss (Malnutrition):  (13% wt loss of 17# in past year)   Orbital Region (Subcutaneous Fat Loss): moderate depletion  Upper Arm Region (Subcutaneous Fat Loss): moderate depletion  Thoracic and Lumbar Region: moderate depletion   [place] Region (Muscle Loss): moderate depletion  Clavicle Bone Region (Muscle Loss): moderate depletion  Clavicle and Acromion Bone Region (Muscle Loss): moderate depletion       Diet order:   Current Diet Order: Consisten Carbohydrate; 2000 calories       Evaluation of Received Nutrient/Fluid Intake  % Intake of Estimated Energy Needs: 25 - 50 %  % Meal Intake: 25 - 50 %      Intake/Output Summary (Last 24 hours) at 11/12/2024 1158  Last data filed at 11/12/2024 0859  Gross per 24 hour   Intake 2050 ml   Output 200 ml  "  Net 1850 ml        Anthropometrics  Temp: 97.4 °F (36.3 °C)  Height Method: Stated  Height: 5' 4" (162.6 cm)  Height (inches): 64 in  Weight Method: Bed Scale  Weight: 49.5 kg (109 lb 2 oz)  Weight (lb): 109.13 lb  Ideal Body Weight (IBW), Female: 120 lb  % Ideal Body Weight, Female (lb): 95.72 %  BMI (Calculated): 18.7  BMI Grade: 17 - 18.4 protein-energy malnutrition grade I       Estimated/Assessed Needs  Weight Used For Calorie Calculations: 49.5 kg (109 lb 2 oz)  Energy Calorie Requirements (kcal): 3665-9071 kcal daily  Energy Need Method: Kcal/kg (30-35 kcal/kg)  Protein Requirements: 60 gm daily  Weight Used For Protein Calculations: 49.5 kg (109 lb 2 oz) (1.2 gm/kg)  Fluid Requirements (mL): 1 mL/kcal or per RD  Estimated Fluid Requirement Method: RDA Method  RDA Method (mL): 1485  CHO Requirement: 185 gm daily    Reason for Assessment  Reason For Assessment: length of stay  Diagnosis:  (acute on chronic hypoxic respiratory failure)  Nutrition Discharge Planning: Pt to discharge on a Diabetic diet.    Nutrition/Diet History  Spiritual, Cultural Beliefs, Holiness Practices, Values that Affect Care: no  Food Allergies: NKFA  Nutrition Support Formula Prior to Admit: Ensure Plus    Nutrition Risk Screen  Nutrition Risk Screen: reduced oral intake over the last month     MST Score: 0  Have you recently lost weight without trying?: No  Weight loss score: 0  Have you been eating poorly because of a decreased appetite?: No  Appetite score: 0       Weight History:  Wt Readings from Last 30 Encounters:   11/01/24 49.5 kg (109 lb 2 oz)   10/31/24 52.1 kg (114 lb 13.8 oz)   10/28/24 51.6 kg (113 lb 12.1 oz)   10/25/24 51.6 kg (113 lb 12.1 oz)   10/24/24 52.3 kg (115 lb 4.8 oz)   10/23/24 52.6 kg (116 lb)   10/23/24 52.8 kg (116 lb 6.4 oz)   10/09/24 54.9 kg (121 lb 0.5 oz)   10/10/24 54.9 kg (121 lb 0.5 oz)   10/07/24 54.1 kg (119 lb 4.3 oz)   10/02/24 56.6 kg (124 lb 11.2 oz)   10/01/24 54 kg (119 lb)   09/30/24 " 55.1 kg (121 lb 6.4 oz)   09/27/24 54 kg (119 lb)   09/19/24 54 kg (119 lb)   09/11/24 54.9 kg (121 lb)   09/09/24 53.9 kg (118 lb 12.8 oz)   08/21/24 53.8 kg (118 lb 8 oz)   08/19/24 53.3 kg (117 lb 6.4 oz)   07/31/24 53.6 kg (118 lb 3.2 oz)   07/29/24 52.6 kg (116 lb)   07/26/24 52 kg (114 lb 10.2 oz)   07/11/24 52.1 kg (114 lb 12.8 oz)   07/08/24 52.3 kg (115 lb 3.2 oz)   06/26/24 51 kg (112 lb 7 oz)   06/21/24 52.3 kg (115 lb 4.8 oz)   06/19/24 52.3 kg (115 lb 3.2 oz)   06/19/24 52.1 kg (114 lb 12.8 oz)   06/03/24 52.8 kg (116 lb 6.4 oz)   05/23/24 52.4 kg (115 lb 9.6 oz)       Lab/Procedures/Meds: Pertinent Labs/Meds Reviewed      Medications:Pertinent Medications Reviewed  Scheduled Meds:   albuterol-ipratropium  3 mL Nebulization Q6H WAKE    amiodarone  200 mg Oral BID    apixaban  2.5 mg Oral BID    electrolytes-dextrose  200 mL Oral Q4H    folic acid  1 mg Oral Daily    gabapentin  100 mg Oral BID    insulin aspart U-100  5 Units Subcutaneous TIDWM    magnesium oxide  400 mg Oral Daily    methylPREDNISolone injection (PEDS and ADULTS)  60 mg Intravenous Q6H    metoprolol succinate  25 mg Oral Daily    pantoprazole  40 mg Oral Daily    polyethylene glycol  17 g Oral Daily    sucralfate  1 g Oral QID    sulfamethoxazole-trimethoprim 800-160mg  1 tablet Oral Every other day    tamsulosin  0.4 mg Oral Daily         Labs: Pertinent Labs Reviewed  Clinical Chemistry:  Recent Labs   Lab 11/10/24  0837 11/11/24  0509 11/12/24  0511    138 136   K 4.2 4.3 4.8   CL 99 99 97   CO2 37* 35* 33*   * 156* 261*   BUN 39* 44* 48*   CREATININE 1.4 1.3 1.3   CALCIUM 8.4* 8.6* 8.6*   PROT 5.1* 5.4* 5.3*   ALBUMIN 2.6* 2.6* 2.6*   BILITOT 0.5 0.4 0.3   ALKPHOS 47* 47* 45*   AST 13 13 10   ALT 17 16 15   ANIONGAP 2* 4* 6*   MG 1.8 1.7 1.7       CBC:   Recent Labs   Lab 11/12/24  0511   WBC 10.84   RBC 2.43*   HGB 7.9*   HCT 24.9*   *   *   MCH 32.5*   MCHC 31.7*       Diabetes:  Recent Labs   Lab  11/11/24  1952 11/12/24  0807 11/12/24  1120   POCTGLUCOSE 320* 286* 259*       Monitor and Evaluation  Food and Nutrient Intake: energy intake  Food and Nutrient Adminstration: diet order  Knowledge/Beliefs/Attitudes: food and nutrition knowledge/skill  Anthropometric Measurements: weight, weight change  Biochemical Data, Medical Tests and Procedures: electrolyte and renal panel, gastrointestinal profile, glucose/endocrine profile, inflammatory profile, lipid profile  Nutrition-Focused Physical Findings: overall appearance       Nutrition Risk  Level of Risk/Frequency of Follow-up:  (weekly)       Nutrition Follow-Up  RD Follow-up?: Yes

## 2024-11-12 NOTE — CARE UPDATE
11/11/24 1919   Patient Assessment/Suction   Level of Consciousness (AVPU) alert   Respiratory Effort Normal;Unlabored   Expansion/Accessory Muscles/Retractions no use of accessory muscles;no retractions   ROSINA Breath Sounds clear   LLL Breath Sounds diminished   RUL Breath Sounds clear   RML Breath Sounds diminished   RLL Breath Sounds diminished   Rhythm/Pattern, Respiratory unlabored;pattern regular;no shortness of breath reported   Cough Frequency infrequent   Cough Type fair;congested;nonproductive   PRE-TX-O2   Device (Oxygen Therapy) high flow nasal cannula w/device  (decreased to 85%)   $ Is the patient on High Flow Oxygen? Yes   Humidification temp set 33   Flow (L/min) (Oxygen Therapy) 35   Oxygen Concentration (%) 90   SpO2 100 %   Pulse Oximetry Type Continuous   $ Pulse Oximetry - Single Charge Pulse Oximetry - Single   Incentive Spirometer   $ Incentive Spirometer Charges done with encouragement   Incentive Spirometer Predicted Level (mL) 1750   Administration (IS) proper technique demonstrated   Number of Repetitions (IS) 8   Level Incentive Spirometer (mL) 500   Patient Tolerance (IS) fair;no adverse signs/symptoms present   Ready to Wean/Extubation Screen   FIO2<=50 (chart decimal) (!) 0.9   Education   $ Education Bronchodilator;Incentive Spirometry;15 min

## 2024-11-12 NOTE — PLAN OF CARE
SW was stopped by the patients  and daughter/Joyce outside of the room. Daughter wanted an updated on placements vs. Other options. DEB informed the family that we have received denials due to the patients high oxygen levels and chemo treatment. Family stated they had previously discussed if they decided to stop the chemo treatment then the patient would go home on hospice. DEB suggested palliative consult to help with this process of making a decision.    DEB informed DEB, Katerine and Latha as well as NP, Vignesh.

## 2024-11-12 NOTE — PLAN OF CARE
Problem: Gas Exchange Impaired  Goal: Optimal Gas Exchange  11/12/2024 0617 by Fina Peterson, RN  Outcome: Not Progressing  11/12/2024 0351 by Fina Peterson, RN  Outcome: Not Progressing

## 2024-11-12 NOTE — PT/OT/SLP PROGRESS
Occupational Therapy      Patient Name:  Alexa Otero   MRN:  [id number]    Patient not seen today secondary to Patient fatigue (pt requiring increased O2 with all exertion, evidenced by NC removed to blow her nose and SpO2 drop to 80% on vapotherm at 35LPM/85% at bed level. Pt had just gotten back to bed and requesting to rest. defer tx.). Will follow-up 11/13/24.    11/12/2024

## 2024-11-12 NOTE — PLAN OF CARE
Problem: Adult Inpatient Plan of Care  Goal: Plan of Care Review  Outcome: Progressing  Goal: Patient-Specific Goal (Individualized)  Outcome: Progressing  Goal: Absence of Hospital-Acquired Illness or Injury  Outcome: Progressing  Goal: Optimal Comfort and Wellbeing  Outcome: Progressing  Goal: Readiness for Transition of Care  Outcome: Progressing     Problem: Sepsis/Septic Shock  Goal: Optimal Coping  Outcome: Progressing  Goal: Absence of Bleeding  Outcome: Progressing  Goal: Blood Glucose Level Within Targeted Range  Outcome: Progressing  Goal: Absence of Infection Signs and Symptoms  Outcome: Progressing  Goal: Optimal Nutrition Intake  Outcome: Progressing     Problem: Pneumonia  Goal: Fluid Balance  Outcome: Progressing  Goal: Resolution of Infection Signs and Symptoms  Outcome: Progressing  Goal: Effective Oxygenation and Ventilation  Outcome: Progressing     Problem: Fall Injury Risk  Goal: Absence of Fall and Fall-Related Injury  Outcome: Progressing     Problem: Skin Injury Risk Increased  Goal: Skin Health and Integrity  Outcome: Progressing     Problem: Infection  Goal: Absence of Infection Signs and Symptoms  Outcome: Progressing     Problem: Gas Exchange Impaired  Goal: Optimal Gas Exchange  Outcome: Progressing     Problem: Wound  Goal: Optimal Coping  Outcome: Progressing  Goal: Optimal Functional Ability  Outcome: Progressing  Goal: Absence of Infection Signs and Symptoms  Outcome: Progressing  Goal: Improved Oral Intake  Outcome: Progressing  Goal: Optimal Pain Control and Function  Outcome: Progressing  Goal: Skin Health and Integrity  Outcome: Progressing  Goal: Optimal Wound Healing  Outcome: Progressing     Problem: Malnutrition  Goal: Improved Nutritional Intake  Outcome: Progressing     Problem: Coping Ineffective  Goal: Effective Coping  Outcome: Progressing

## 2024-11-12 NOTE — PLAN OF CARE
Problem: Wound  Goal: Optimal Coping  Outcome: Not Progressing  Goal: Optimal Functional Ability  Outcome: Not Progressing  Goal: Absence of Infection Signs and Symptoms  Outcome: Progressing  Goal: Improved Oral Intake  Outcome: Progressing  Goal: Optimal Pain Control and Function  Outcome: Progressing  Goal: Skin Health and Integrity  Outcome: Not Progressing   pATIENT REMAINS ON VAPOTHERM AT 90%, SATS BETWEEN %, SOLUMEDROL AND STEROID CONTINUED

## 2024-11-12 NOTE — PT/OT/SLP PROGRESS
Physical Therapy Treatment    Patient Name:  Alexa Otero   MRN:  [id number]    Recommendations:     Discharge Recommendations: Moderate Intensity Therapy  Discharge Equipment Recommendations: none  Barriers to discharge:  increased assist with mobility, decreased activity tolerance, balance deficits, respiratory deficits    Assessment:     Alexa Otero is a 84 y.o. female admitted with a medical diagnosis of Acute on chronic hypoxic respiratory failure.  She presents with the following impairments/functional limitations: weakness, impaired endurance, impaired self care skills, impaired functional mobility, gait instability, impaired balance, decreased safety awareness, decreased lower extremity function, decreased upper extremity function, impaired cardiopulmonary response to activity.    Pt agreeable to visit. Pt on vapotherm 35L 90% with SPO2 96%.    Pt performed supine to sit transfer with contact guard assist. Pt tolerated sitting EOB with min to mod assist due to posterior lean. Verbal cuing for improved posture. Pt satting 94% sitting EOB.    Pt required mod assist for stand pivot transfer from bed to chair with no AD, pt holding onto therapist's arms.    Pt desatted to 84% with transfer to chair. Oxygen had to be increased to 40L and 100% for pt to recover to 90%. Verbal cuing for pursed lip breathing.    After a few minutes, pt was satting 97% on 40L and 100%. Pt was slowly titrated back down to 35L and 90% with pt able to maintain sats 94-95%. Nurse informed.    Rehab Prognosis: Fair; patient would benefit from acute skilled PT services to address these deficits and reach maximum level of function.    Recent Surgery: * No surgery found *      Plan:     During this hospitalization, patient to be seen 5 x/week to address the identified rehab impairments via gait training, therapeutic activities, therapeutic exercises and progress toward the following goals:    Plan of Care Expires:  12/02/24    Subjective      Chief Complaint: fatigue  Patient/Family Comments/goals: to get better  Pain/Comfort:  Pain Rating 1: 0/10      Objective:     Communicated with nurse prior to session.  Patient found HOB elevated with telemetry, peripheral IV, priest catheter, oxygen, pulse ox (continuous) upon PT entry to room.     General Precautions: Standard, fall  Orthopedic Precautions: N/A  Braces: N/A  Respiratory Status: High flow, flow 35 > 40 L/min, concentration 90 > 100%     Functional Mobility:  Bed Mobility:     Supine to Sit: contact guard assistance  Transfers:     Bed to Chair: moderate assistance with  no AD  using  Stand Pivot      AM-PAC 6 CLICK MOBILITY          Treatment & Education:  Pt educated on importance of time OOB, importance of intermittent mobility, safe techniques for transfers/ambulation, discharge recommendations/options, and use of call light for assistance and fall prevention.      Patient left up in chair with all lines intact, call button in reach, nurse notified, and spouse present..    GOALS:   Multidisciplinary Problems       Physical Therapy Goals          Problem: Physical Therapy    Goal Priority Disciplines Outcome Interventions   Physical Therapy Goal     PT, PT/OT Progressing    Description: Goals to be met by: 24     Patient will increase functional independence with mobility by performin. Supine to sit with Supervision  2. Sit to stand transfer with Supervision  3. Bed to chair transfer with Supervision using Rolling Walker  4. Gait  x 100 feet with Supervision using Rolling Walker.                              Time Tracking:     PT Received On: 24  PT Start Time: 958     PT Stop Time: 1026  PT Total Time (min): 28 min     Billable Minutes: Therapeutic Activity 28    Treatment Type: Treatment  PT/PTA: PTA     Number of PTA visits since last PT visit: 2     2024

## 2024-11-12 NOTE — CARE UPDATE
11/11/24 1919   Patient Assessment/Suction   Level of Consciousness (AVPU) alert   Respiratory Effort Normal;Unlabored   Expansion/Accessory Muscles/Retractions no use of accessory muscles;no retractions   ROSINA Breath Sounds clear   LLL Breath Sounds diminished   RUL Breath Sounds clear   RML Breath Sounds diminished   RLL Breath Sounds diminished   Rhythm/Pattern, Respiratory unlabored;pattern regular;no shortness of breath reported   Cough Frequency infrequent   Cough Type fair;congested;nonproductive   PRE-TX-O2   Device (Oxygen Therapy) high flow nasal cannula w/device   $ Is the patient on High Flow Oxygen? Yes   Humidification temp set 33   Flow (L/min) (Oxygen Therapy) 35   Oxygen Concentration (%) 90   SpO2 100 %   Pulse Oximetry Type Continuous   $ Pulse Oximetry - Single Charge Pulse Oximetry - Single   Incentive Spirometer   $ Incentive Spirometer Charges done with encouragement   Incentive Spirometer Predicted Level (mL) 1750   Administration (IS) proper technique demonstrated   Number of Repetitions (IS) 8   Level Incentive Spirometer (mL) 500   Patient Tolerance (IS) fair;no adverse signs/symptoms present   Ready to Wean/Extubation Screen   FIO2<=50 (chart decimal) (!) 0.9   Education   $ Education Bronchodilator;Incentive Spirometry;15 min

## 2024-11-12 NOTE — PLAN OF CARE
DEB attempted to talk to pt at bedside regarding HH options due to having a difficult time finding an accepting SNF placement.  Pt was asleep.  DEB called pt's daughter, Joyce, to discuss HH options with no answer.  DEB left a VM requesting a call back.       11/12/24 1509   Post-Acute Status   Post-Acute Authorization Home Health   Coverage [employer] MANAGED MEDICARE - [employer] MED[employer] HMO   Discharge Plan   Discharge Plan A Skilled Nursing Facility   Discharge Plan B Home Health

## 2024-11-13 PROBLEM — Z71.89 GOALS OF CARE, COUNSELING/DISCUSSION: Status: ACTIVE | Noted: 2024-11-13

## 2024-11-13 NOTE — ASSESSMENT & PLAN NOTE
Likely secondary to her bilateral extensive pneumonia vs pneumonitis  On 4L NC at home  Currently on high flow nasal cannula and intermittent bipap  Pulm consulted   CTA chest ruled out PE.  Echo done last month showed normal systolic function.   Currently on high flow 35%  P.o. steroids changed to Solu-Medrol  Bactrim to prevent PCP  Respiratory status is not improving.  Palliative care consulted.

## 2024-11-13 NOTE — PROGRESS NOTES
[facility] Medicine  Progress Note    Patient Name: Alexa Otero  MRN: [id number]  Patient Class: IP- Inpatient   Admission Date: 11/1/2024  Length of Stay: 12 days  Attending Physician: Judy Birmingham MD  Primary Care Provider: Idalia Greco MD        Subjective:     Principal Problem:Acute on chronic hypoxic respiratory failure        HPI:  84-year-old female with a past medical history of lung cancer actively getting chemo last of which was 5 days ago, who presented to the ER because of generalized weakness.  According to the patient, she woke up today, tried to get out of bed, but felt very weak, and slid beside the bed.  Did not hit her head, and did not lose consciousness.  She however could not get up however, and her  could not assist.  911 was called.  On arrival of EMS, patient was satting 88% on 4 L, and appeared to be short of breath.  She was brought to the ER for evaluation.    In the ER, vitals showed a blood pressure of 145/65, heart rate of 103, respiratory rate of 20, afebrile satting 60% on 4 L.  Immediately patient was placed on non-rebreather.  CBC with white count of 16.8, and macrocytic anemia.  CMP showed hypokalemia of 3.4.  First troponin is elevated at 46.  Point of care Lactic acid was 2.1.  Blood cultures were collected.  EKG showed sinus rhythm.  Chest x-ray showed no significant change of bilateral diffuse mixed interstitial and airspace lung opacities.  CTA chest was done, and it was negative for PE, but showed extensive interstitial and airspace opacities throughout both lungs, having significantly worsened in the left lung compared to prior CT. Unchanged small to moderate sized bilateral pleural effusions.  Cefepime was ordered, and patient will be admitted to Medicine for further care of her severe sepsis.    Overview/Hospital Course:  Ms. Otero has been monitored closely during her hospitalization. She was admitted on 11/1/2024 with acute  on chronic hypoxemic resp failure. CTA chest reveals extensive interstitial opacities c/w multifocal pna and/or ARDS. She requires high flow nasal cannula up to 15L now on 13L. Pulm and ID have been consulted. Pulm recommends broad spectrum abx - cefepime, doxy, and vanc initially. MRSA screen negative and vanc d/c'ed. She has stage IV lung ca and completed Carbo/Pemetrexed last year and is currently on pemetrexed (Alimta) maintenance with last dose 10/28. This can cause an interstitial pneumonitis and pulm has started prednisone. Plan for bronchoscopy on Monday if no improvement and recommended continuing eliquis. BiPAP qHS and naps. Duonebs Q6h. ID has ordered a respiratory infection panel that is negative. She's had abdominal bloating and discomfort for some time and had an outpt CT abd/pelvis with gastric wall thickening that is is nonspecific could be related to gastritis. She has a history of peptic ulcers and PCP started protonix/carafate. KUB on 11/2 with nonobstructive bowel gas pattern and moderate stool burden and pt was treated with laxative suppository. She has chronic macrocytic anemia that appears at baseline. She had a leukocytosis with left shift on admission that has trended down from 16K-->12K-->8K. CRP 36, procal 2.9. Pt had a BM on 11/2, but abd remained distended. She was found to be retaining a significant amount of urine on bladder scan >900 and priest was placed with 1500mL out with resolution of abd distention. Pulm increased steriods on 11/3 to IV solumedrol, she's requiring BiPAP support and was given IV lasix. Macrocytic anemia at baseline on admission but trending down and she will receive 1 unit PRBCs 11/4. She has developed steroid induced hyperglycemia and insulin sliding titrated up to moderate dose for tighter glucose control. Priest d/c'ed on 11/5 and had to be reinserted on 11/06. PT/OT consulted and pt up to chair on 11/5. She desatted with movement and took some time and  increased supplemental O2 to recover. Prior to moving to the chair, O2 was weaned down to 10L high flow. 11/6/2024 pulmonology change patient to p.o. prednisone as her oxygen demand had improved.  Patient was being maintained on approximately 5 L of oxygen.  11/11/24 patient was working with physical therapy when she became hypoxic requiring Vapotherm.  At this time she is currently requiring max dose of Vapotherm.  Bronchoscopy is not suggested by pulmonology due to patient's fragile condition.  Patient was switched back to IV Solu-Medrol q.6 I was per pulmonology.    Interval History: On high flow 35% at time of my assessment.  She has been seen by palliative care today and is considering IP hospice.  She has family coming to visit from out of town and would like the opportunity to see them prior to withdrawing any care; however, we will do what is necessary to keep her comfortable meanwhile.    Review of Systems   Constitutional:  Positive for fatigue. Negative for chills and fever.   Respiratory:  Positive for shortness of breath. Negative for cough.    Cardiovascular: Negative.    Gastrointestinal: Negative.    Genitourinary: Negative.    Skin: Negative.    Neurological:  Positive for weakness.     Objective:     Vital Signs (Most Recent):  Temp: 97.8 °F (36.6 °C) (11/13/24 1328)  Pulse: 77 (11/13/24 1328)  Resp: (!) 22 (11/13/24 1307)  BP: (!) 108/52 (11/13/24 1328)  SpO2: (!) 94 % (11/13/24 1328) Vital Signs (24h Range):  Temp:  [97 °F (36.1 °C)-98.4 °F (36.9 °C)] 97.8 °F (36.6 °C)  Pulse:  [68-81] 77  Resp:  [16-22] 22  SpO2:  [93 %-100 %] 94 %  BP: ()/(46-66) 108/52     Weight: 49.5 kg (109 lb 2 oz)  Body mass index is 18.73 kg/m².    Intake/Output Summary (Last 24 hours) at 11/13/2024 1446  Last data filed at 11/13/2024 1241  Gross per 24 hour   Intake 1120 ml   Output 750 ml   Net 370 ml         Physical Exam  Vitals and nursing note reviewed. Exam conducted with a chaperone present.    Constitutional:       Interventions: Nasal cannula in place.      Comments: Chronically ill appearing   HENT:      Head: Normocephalic and atraumatic.      Nose: Nose normal.      Mouth/Throat:      Mouth: Mucous membranes are moist.      Pharynx: Oropharynx is clear.   Eyes:      Pupils: Pupils are equal, round, and reactive to light.   Cardiovascular:      Rate and Rhythm: Normal rate.   Pulmonary:      Effort: Tachypnea present.      Breath sounds: Decreased breath sounds present.   Abdominal:      General: There is distension.      Palpations: Abdomen is soft.   Musculoskeletal:         General: Normal range of motion.      Cervical back: Normal range of motion and neck supple.      Right lower leg: No edema.      Left lower leg: No edema.   Skin:     General: Skin is warm.      Capillary Refill: Capillary refill takes 2 to 3 seconds.      Comments: Multiple areas of ecchymosis to BLE   Neurological:      General: No focal deficit present.      Mental Status: She is alert and oriented to person, place, and time.   Psychiatric:         Mood and Affect: Mood normal. Affect is tearful.         Behavior: Behavior normal.             Significant Labs: All pertinent labs within the past 24 hours have been reviewed.  CBC:   Recent Labs   Lab 11/12/24  0511 11/13/24  0459   WBC 10.84 16.97*   HGB 7.9* 7.5*   HCT 24.9* 23.7*   * 137*     CMP:   Recent Labs   Lab 11/12/24  0511 11/13/24  0459    138   K 4.8 4.7   CL 97 99   CO2 33* 35*   * 240*   BUN 48* 51*   CREATININE 1.3 1.4   CALCIUM 8.6* 7.9*   PROT 5.3* 4.9*   ALBUMIN 2.6* 2.4*   BILITOT 0.3 0.3   ALKPHOS 45* 44*   AST 10 10   ALT 15 16   ANIONGAP 6* 4*       Significant Imaging: I have reviewed all pertinent imaging results/findings within the past 24 hours.    Assessment/Plan:      * Acute on chronic hypoxic respiratory failure  Likely secondary to her bilateral extensive pneumonia vs pneumonitis  On 4L NC at home  Currently on high flow  nasal cannula and intermittent bipap  Pulm consulted   CTA chest ruled out PE.  Echo done last month showed normal systolic function.   Currently on high flow 35%  P.o. steroids changed to Solu-Medrol  Bactrim to prevent PCP  Respiratory status is not improving.  Palliative care consulted.      Goals of care, counseling/discussion  Advance Care Planning    See palliative care note    Urinary retention  Constipation likely contributing  She had a couple of Bms and started bowel regimen  Priest placed for significant retention   D/C priest 11/5 and had to be reinserted on 11/6  Flomax initiated  Urology consulted:  Recommended follow up outpatient.  Keep Priest in place for 5-7 days before voiding trial.  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 Most recent hemoglobin and hematocrit are listed below.  Recent Labs     11/11/24  0509 11/12/24  0511 11/13/24  0459   HGB 8.5* 7.9* 7.5*   HCT 27.0* 24.9* 23.7*       Plan  - Monitor serial CBC: Daily  - Transfuse PRBC if patient becomes hemodynamically unstable, symptomatic or H/H drops below 7/21.  - Patient has received 1 units of PRBCs on 11/4  - Patient's anemia is currently decreasing  - T&S in AM in the event she requires a transfusion    Pneumonitis  Patient was started on cefepime, doxy for atypicals, and Vanc, vanc d/c'ed with negative MRSA screen  Pulmonary and Infectious Disease following  Steroids increased by pulmonology   Antibiotic therapy complete    Antibiotics (From admission, onward)      Start     Stop Route Frequency Ordered    11/12/24 0900  sulfamethoxazole-trimethoprim 800-160mg per tablet 1 tablet         -- Oral Every other day 11/11/24 0839    11/01/24 1300  mupirocin 2 % ointment         11/06/24 0859 Nasl 2 times daily 11/01/24 1200            Microbiology Results (last 7 days)       ** No results found for the last 168 hours. **            Pleural effusion  Bilateral, stable.   Pulm  consulted  In the setting of lung ca      Malignant neoplasm of lower lobe of right lung  Carbo/Pemetrexed until 4/2024 now on Pemetrexed maintenance with last dose 10/28/24  Consult pt's oncologist, Dr. Cassidy following      Atrial fibrillation  Patient has paroxysmal (<7 days) atrial fibrillation. Patient is currently in sinus rhythm. [id number] Score: 5. The patients heart rate in the last 24 hours is as follows:  Pulse  Min: 68  Max: 81     Antiarrhythmics  Amiodarone and metoprolol    Anticoagulants  Eliquis    Plan  - Replete lytes with a goal of K>4, Mg >2  - Patient is anticoagulated, see medications listed above.  - Patient's afib is currently controlled    ACP (advance care planning)  See palliative care consult        VTE Risk Mitigation (From admission, onward)           Ordered     apixaban tablet 2.5 mg  2 times daily         11/01/24 1316     IP VTE HIGH RISK PATIENT  Once         11/01/24 1158     Place sequential compression device  Until discontinued         11/01/24 1158                    Discharge Planning   ALYCIA: 11/15/2024     Code Status: DNR   Is the patient medically ready for discharge?:     Reason for patient still in hospital (select all that apply): Patient trending condition, Treatment, Consult recommendations, and Pending disposition  Discharge Plan A: Skilled Nursing Facility   Discharge Delays: None known at this time              BRITTON Sylvester  Department of Hospital Medicine   [facility]

## 2024-11-13 NOTE — CARE UPDATE
11/13/24 0759   Patient Assessment/Suction   Level of Consciousness (AVPU) alert   Respiratory Effort Normal;Unlabored   Expansion/Accessory Muscles/Retractions no use of accessory muscles;no retractions;expansion symmetric   All Lung Fields Breath Sounds diminished   Rhythm/Pattern, Respiratory unlabored;pattern regular;depth regular   Cough Frequency no cough   Cough Type weak   PRE-TX-O2   Device (Oxygen Therapy) high flow nasal cannula w/device   Flow (L/min) (Oxygen Therapy) 35   Oxygen Concentration (%) 85  (attempted to wean to 30L and 70%, sats dropped to 85%)   SpO2 100 %   Pulse Oximetry Type Intermittent   $ Pulse Oximetry - Multiple Charge Pulse Oximetry - Multiple   Pulse 75   Resp 18   Aerosol Therapy   $ Aerosol Therapy Charges Aerosol Treatment   Daily Review of Necessity (SVN) completed   Respiratory Treatment Status (SVN) given   Treatment Route (SVN) in-line   Patient Position HOB elevated   Post Treatment Assessment (SVN) breath sounds unchanged   Signs of Intolerance (SVN) none   Incentive Spirometer   $ Incentive Spirometer Charges done with encouragement   Incentive Spirometer Predicted Level (mL) 1700   Administration (IS) proper technique demonstrated   Number of Repetitions (IS) 10   Level Incentive Spirometer (mL) 500   Patient Tolerance (IS) fair;no adverse signs/symptoms present   Ready to Wean/Extubation Screen   FIO2<=50 (chart decimal) (!) 0.85

## 2024-11-13 NOTE — PT/OT/SLP PROGRESS
"Occupational Therapy   Treatment    Name: Alexa Otero  MRN: [id number]  Admitting Diagnosis:  Acute on chronic hypoxic respiratory failure       Recommendations:     Discharge Recommendations: No Therapy Indicated  Discharge Equipment Recommendations:  to be determined by next level of care  Barriers to discharge:  Other (Comment) (O2 demand)    Assessment:     Alexa Otero is a 84 y.o. female with a medical diagnosis of Acute on chronic hypoxic respiratory failure.  She presents with oxygen demands so great that she will not be able to leave the hospital. Pt has decided it is her "time to go" and is at peace with this per her report during our session of self-care and therapeutic activity to bring lydia and quality to her life in these final days. Pt was participatory with 3 rounds of balloon volley at bed level with HOB raised. She incorporated BUE integration, breathing techniques and expression of autonomy by self terminating each round at her max fatigue level. Pt is happy that she can still do something and is anticipating being able to play and have fun with her visitors that will be coming to say good bye this week.  Performance deficits affecting function are weakness, impaired endurance, impaired self care skills, impaired functional mobility, impaired balance, gait instability, decreased upper extremity function, decreased lower extremity function, pain, impaired cardiopulmonary response to activity.     Rehab Prognosis:  Poor; patient would benefit from acute skilled OT services to address these deficits and reach maximum level of function.       Plan:     Patient to be seen 6 x/week to address the above listed problems via self-care/home management, therapeutic activities  Plan of Care Expires: 12/06/24  Plan of Care Reviewed with: patient    Subjective     Chief Complaint: none  Patient/Family Comments/goals: "I don't know what I CAN still do?"  Pain/Comfort:  Pain Rating 1: other (see comments) (no " c/o pain)    Objective:     Communicated with: RT prior to session.  Patient found HOB elevated with telemetry, peripheral IV, priest catheter, oxygen, pulse ox (continuous), blood pressure cuff upon OT entry to room.    General Precautions: Standard, fall    Orthopedic Precautions:N/A  Braces: N/A  Respiratory Status: High flow, flow 35 L/min, concentration 80% but increased to 40 LPM and 100% for activity participation this session.     Occupational Performance:     Bed Mobility:    N/A    Functional Mobility/Transfers:  N/A  Functional Mobility: N/A    Activities of Daily Living:  PEREIRA encouraging pt to continue self-feeding and grooming as long as she wants to and it makes her feel good. Pt v/u and agreeable. She currently does feed herself w/o (except some packaging) and has her daughter set her up for dental and facial care each day.    [facility] 6 Click ADL: 14    Treatment & Education:  -At bed level with HOB raised to 80°, Pt completed 3 rounds of balloon volley with the following times in order of occurrence and at least 5 min rest break between activity: 1:27, 3:00, 2:30. Pt willingly and joyfully incorporating BUE integration, core muscles, AROM BUE, increased mood and circulation with pt reporting it was a bit easier to breath but very tired at session end..   -PEREIRA encouraging pt to enjoy her time with her family and to interact with them in fun ways, like balloon volley, bubble popping, and laughing.  Laughter is the best medicine! Pt v/u.    Patient left HOB elevated with all lines intact, call button in reach, and nurseHumberto notified    GOALS:   Multidisciplinary Problems       Occupational Therapy Goals          Problem: Occupational Therapy    Goal Priority Disciplines Outcome Interventions   Occupational Therapy Goal     OT, PT/OT Progressing    Description: Goals to be met by: 12/06/2024     Patient will increase functional independence with ADLs by performing:    UE Dressing with  Supervision.  LE Dressing with Supervision.  Grooming while standing at sink with Supervision.  Toileting from bedside commode with Supervision for hygiene and clothing management.   Toilet transfer to bedside commode with Supervision.                         Time Tracking:     OT Date of Treatment: 11/13/24  OT Start Time: 1305  OT Stop Time: 1347  OT Total Time (min): 42 min    Billable Minutes:Self Care/Home Management 23 min  Therapeutic Activity 19 min    OT/IZZY: IZZY     Number of IZZY visits since last OT visit: 1    11/13/2024

## 2024-11-13 NOTE — ASSESSMENT & PLAN NOTE
Patient has paroxysmal (<7 days) atrial fibrillation. Patient is currently in sinus rhythm. [id number] Score: 5. The patients heart rate in the last 24 hours is as follows:  Pulse  Min: 68  Max: 81     Antiarrhythmics  Amiodarone and metoprolol    Anticoagulants  Eliquis    Plan  - Replete lytes with a goal of K>4, Mg >2  - Patient is anticoagulated, see medications listed above.  - Patient's afib is currently controlled

## 2024-11-13 NOTE — PT/OT/SLP PROGRESS
Physical Therapy      Patient Name:  Alexa Otero   MRN:  [id number]    Patient not seen today secondary to Patient fatigue (x 2 attempts). Will follow-up 11/14/24.

## 2024-11-13 NOTE — SUBJECTIVE & OBJECTIVE
Interval History: Pt seen today for palliative medicine consult to discuss goals of care.     Past Medical History:   Diagnosis Date    Arthritis     Cardiac arrest 10/2023    Cataract     Chronic obstructive pulmonary disease, unspecified COPD type 03/20/2024    Colon polyp     Coronary artery disease 03/20/2024    Diabetes mellitus type II 2012    Encounter for blood transfusion     Extra-ovarian endometrioid adenocarcinoma 2020    GERD (gastroesophageal reflux disease)     History of chronic cough     clear productive    Hyperlipidemia     Hypertension     Macular degeneration     Ovarian cancer     PONV (postoperative nausea and vomiting)     Squamous cell carcinoma 1980's    precancer of cervix       Past Surgical History:   Procedure Laterality Date    AUGMENTATION OF BREAST      BRONCHOSCOPY N/A 12/12/2023    Procedure: BRONCHOSCOPY;  Surgeon: Neva Christian MD;  Location: [facility];  Service: ENT;  Laterality: N/A;    BRONCHOSCOPY WITH FLUOROSCOPY Right 05/11/2023    Procedure: BRONCHOSCOPY, WITH FLUOROSCOPY;  Surgeon: Neva Christian MD;  Location: [facility];  Service: Pulmonary;  Laterality: Right;    BRONCHOSCOPY WITH FLUOROSCOPY N/A 12/15/2023    Procedure: BRONCHOSCOPY, WITH FLUOROSCOPY;  Surgeon: Neva Christian MD;  Location: [facility];  Service: Pulmonary;  Laterality: N/A;    CATARACT EXTRACTION BILATERAL W/ ANTERIOR VITRECTOMY Bilateral     CHOLECYSTECTOMY      COLONOSCOPY      COLONOSCOPY N/A 04/20/2023    Procedure: COLONOSCOPY;  Surgeon: Sabino Stephenson MD;  Location: [facility];  Service: Endoscopy;  Laterality: N/A;    CORONARY STENT PLACEMENT  10/2023    x 3    ESOPHAGOGASTRODUODENOSCOPY N/A 06/20/2018    Procedure: EGD (ESOPHAGOGASTRODUODENOSCOPY);  Surgeon: Sabino Stephenson MD;  Location: [facility];  Service: Endoscopy;  Laterality: N/A;    ESOPHAGOGASTRODUODENOSCOPY N/A 03/31/2023    Procedure: EGD (ESOPHAGOGASTRODUODENOSCOPY);  Surgeon: Sabino Stephenson MD;  Location: [facility]  ENDO;  Service: Endoscopy;  Laterality: N/A;    ESOPHAGOGASTRODUODENOSCOPY N/A 12/11/2023    Procedure: EGD (ESOPHAGOGASTRODUODENOSCOPY);  Surgeon: Pretty Salgado MD;  Location: [facility] ENDO;  Service: Endoscopy;  Laterality: N/A;    HYSTERECTOMY  1976    partial    INJECTION OF ANESTHETIC AGENT AROUND MEDIAL BRANCH NERVES INNERVATING LUMBAR FACET JOINT Right 05/10/2023    Procedure: Block-nerve-Lateral-branch-lumbar;  Surgeon: Agustin Robles MD;  Location: [facility] OR;  Service: Pain Management;  Laterality: Right;  L5 and s1,s2 LBB    INJECTION OF ANESTHETIC AGENT AROUND MEDIAL BRANCH NERVES INNERVATING LUMBAR FACET JOINT Right 05/30/2023    Procedure: Block-nerve-medial branch-lumbar;  Surgeon: Agustin Robles MD;  Location: [facility] OR;  Service: Pain Management;  Laterality: Right;  L5, s1 ,s2 LBB #2    INJECTION OF ANESTHETIC AGENT AROUND NERVE Right 10/31/2023    Procedure: INTERCOSTAL NERVE BLOCK;  Surgeon: Washington Shankar MD;  Location: [facility] OR;  Service: Cardiothoracic;  Laterality: Right;    INJECTION, SACROILIAC JOINT Right 01/26/2023    Procedure: INJECTION,SACROILIAC JOINT;  Surgeon: Agustin Robles MD;  Location: [facility] OR;  Service: Pain Management;  Laterality: Right;    INSERTION OF TUNNELED CENTRAL VENOUS CATHETER (CVC) WITH SUBCUTANEOUS PORT Right 10/19/2020    Procedure: INSERTION, PORT-A-CATH;  Surgeon: Misti Mcfarland MD;  Location: [facility] OR;  Service: General;  Laterality: Right;    INTRAMEDULLARY RODDING OF TROCHANTER OF FEMUR Right 11/28/2021    Procedure: INSERTION, INTRAMEDULLARY LUC, FEMUR, TROCHANTER/RIGHT TFN DR FAJARDO NOTIFIED REP;  Surgeon: Kp Fajardo MD;  Location: [facility] OR;  Service: Orthopedics;  Laterality: Right;  SKIP    ROBOT-ASSISTED LAPAROSCOPIC LYMPHADENECTOMY USING DA NELLA XI N/A 09/21/2020    Procedure: XI ROBOTIC LYMPHADENECTOMY-pelvic and kell-aortic;  Surgeon: Altagracia Ray MD;  Location: [facility] OR;  Service: OB/GYN;  Laterality: N/A;    ROBOT-ASSISTED LAPAROSCOPIC  OMENTECTOMY USING DA NELLA XI N/A 09/21/2020    Procedure: XI ROBOTIC OMENTECTOMY;  Surgeon: Altagracia Ray MD;  Location: [facility] OR;  Service: OB/GYN;  Laterality: N/A;    ROBOT-ASSISTED LAPAROSCOPIC SALPINGO-OOPHORECTOMY USING DA NELLA XI Bilateral 09/21/2020    Procedure: XI ROBOTIC SALPINGO-OOPHORECTOMY;  Surgeon: Altagracia Ray MD;  Location: [facility] OR;  Service: OB/GYN;  Laterality: Bilateral;    THORACOSCOPIC BIOPSY OF PLEURA Right 10/31/2023    Procedure: VATS, WITH PLEURA BIOPSY;  Surgeon: Washington Shankar MD;  Location: [facility] OR;  Service: Cardiothoracic;  Laterality: Right;  FROZEN SECTION   **KRISTEN-ASSISDT**    THORACOTOMY Right 10/31/2023    Procedure: THORACOTOMY;  Surgeon: Washington Shankar MD;  Location: [facility] OR;  Service: Cardiothoracic;  Laterality: Right;    UPPER GASTROINTESTINAL ENDOSCOPY         Review of patient's allergies indicates:  No Known Allergies    Medications:  Continuous Infusions:  Scheduled Meds:   albuterol-ipratropium  3 mL Nebulization Q6H WAKE    amiodarone  200 mg Oral BID    apixaban  2.5 mg Oral BID    electrolytes-dextrose  200 mL Oral Q4H    folic acid  1 mg Oral Daily    gabapentin  100 mg Oral BID    insulin aspart U-100  5 Units Subcutaneous TIDWM    magnesium oxide  400 mg Oral Daily    methylPREDNISolone injection (PEDS and ADULTS)  60 mg Intravenous Q6H    metoprolol succinate  25 mg Oral Daily    pantoprazole  40 mg Oral Daily    polyethylene glycol  17 g Oral Daily    sucralfate  1 g Oral QID    sulfamethoxazole-trimethoprim 800-160mg  1 tablet Oral Every other day    tamsulosin  0.4 mg Oral Daily     PRN Meds:  Current Facility-Administered Medications:     0.9%  NaCl infusion (for blood administration), , Intravenous, Q24H PRN    acetaminophen, 650 mg, Oral, Q8H PRN    acetaminophen, 650 mg, Oral, Q4H PRN    aluminum-magnesium hydroxide-simethicone, 30 mL, Oral, QID PRN    dextrose 50%, 12.5 g, Intravenous, PRN    dextrose 50%, 25 g, Intravenous, PRN     glucagon (human recombinant), 1 mg, Intramuscular, PRN    glucose, 16 g, Oral, PRN    glucose, 24 g, Oral, PRN    HYDROcodone-acetaminophen, 1 tablet, Oral, Q6H PRN    insulin aspart U-100, 0-10 Units, Subcutaneous, QID (AC + HS) PRN    lactulose, 20 g, Oral, Q6H PRN    LORazepam, 0.5 mg, Oral, Q8H PRN    magnesium oxide, 800 mg, Oral, PRN    magnesium oxide, 800 mg, Oral, PRN    melatonin, 6 mg, Oral, Nightly PRN    naloxone, 0.02 mg, Intravenous, PRN    ondansetron, 4 mg, Intravenous, Q6H PRN    potassium bicarbonate, 35 mEq, Oral, PRN    potassium bicarbonate, 50 mEq, Oral, PRN    potassium bicarbonate, 60 mEq, Oral, PRN    potassium, sodium phosphates, 2 packet, Oral, PRN    potassium, sodium phosphates, 2 packet, Oral, PRN    potassium, sodium phosphates, 2 packet, Oral, PRN    sodium chloride 0.9%, 2 mL, Intravenous, Q12H PRN    Family History       Problem Relation (Age of Onset)    Breast cancer Maternal Grandmother, Paternal Grandmother    Cancer Mother (57), Father (56)    Colon polyps Daughter    Melanoma Brother    Skin cancer Sister, Brother, Brother          Tobacco Use    Smoking status: Former     Current packs/day: 0.00     Average packs/day: 1 pack/day for 20.0 years (20.0 ttl pk-yrs)     Types: Cigarettes     Start date:      Quit date: 1981     Years since quittin.8    Smokeless tobacco: Never    Tobacco comments:     quit 40 yrs ago   Substance and Sexual Activity    Alcohol use: Yes     Alcohol/week: 1.0 standard drink of alcohol     Types: 1 Glasses of wine per week     Comment: occasional    Drug use: No    Sexual activity: Not Currently     Partners: Male       Review of Systems   Constitutional:  Positive for activity change and fatigue.   Respiratory:  Positive for shortness of breath.    All other systems reviewed and are negative.    Objective:     Vital Signs (Most Recent):  Temp: 97.5 °F (36.4 °C) (24)  Pulse: 71 (24 09)  Resp: 18 (24 0759)  BP: 113/60  (11/13/24 0830)  SpO2: 97 % (11/13/24 0930) Vital Signs (24h Range):  Temp:  [97 °F (36.1 °C)-98.4 °F (36.9 °C)] 97.5 °F (36.4 °C)  Pulse:  [71-81] 71  Resp:  [16-20] 18  SpO2:  [94 %-100 %] 97 %  BP: ()/(46-66) 113/60     Weight: 49.5 kg (109 lb 2 oz)  Body mass index is 18.73 kg/m².       Physical Exam  Vitals and nursing note reviewed.   Constitutional:       General: She is not in acute distress.     Appearance: She is ill-appearing.   HENT:      Mouth/Throat:      Mouth: Mucous membranes are moist.   Eyes:      General: Scleral icterus present.      Pupils: Pupils are equal, round, and reactive to light.   Cardiovascular:      Rate and Rhythm: Normal rate and regular rhythm.      Pulses: Normal pulses.   Pulmonary:      Effort: No respiratory distress.      Comments: Mild tachypnea when speaking, able to complete full sentences  Skin:     General: Skin is warm and dry.   Neurological:      General: No focal deficit present.      Mental Status: She is alert and oriented to person, place, and time.   Psychiatric:         Mood and Affect: Mood normal.         Behavior: Behavior normal.         Thought Content: Thought content normal.         Judgment: Judgment normal.            Review of Symptoms      Symptom Assessment (ESAS 0-10 Scale)  Pain:  0  Dyspnea:  5  Anxiety:  0  Nausea:  0  Depression:  0  Anorexia:  0  Fatigue:  7  Insomnia:  0  Restlessness:  0  Agitation:  0         Performance Status:  40    Living Arrangements:  Lives with spouse    Psychosocial/Cultural:   See Palliative Psychosocial Note: No  **Primary  to Follow**  Palliative Care  Consult: No        Advance Care Planning   Advance Directives:   Living Will: Yes        Copy on chart: Yes    Do Not Resuscitate Status: Yes    Medical Power of : Yes      Decision Making:  Patient answered questions  Goals of Care: The patient endorses that what is most important right now is to focus on remaining as  independent as possible, symptom/pain control, and quality of life, even if it means sacrificing a little time    Accordingly, we have decided that the best plan to meet the patient's goals includes continuing with treatment         Significant Labs: All pertinent labs within the past 24 hours have been reviewed.  CBC:   Recent Labs   Lab 11/13/24 0459   WBC 16.97*   HGB 7.5*   HCT 23.7*   *   *     BMP:  Recent Labs   Lab 11/13/24 0459   *      K 4.7   CL 99   CO2 35*   BUN 51*   CREATININE 1.4   CALCIUM 7.9*   MG 1.7     LFT:  Lab Results   Component Value Date    AST 10 11/13/2024    ALKPHOS 44 (L) 11/13/2024    BILITOT 0.3 11/13/2024     Albumin:   Albumin   Date Value Ref Range Status   11/13/2024 2.4 (L) 3.5 - 5.2 g/dL Final     Protein:   Total Protein   Date Value Ref Range Status   11/13/2024 4.9 (L) 6.0 - 8.4 g/dL Final     Lactic acid:   Lab Results   Component Value Date    LACTATE 0.7 11/01/2024    LACTATE 0.7 12/10/2023       Significant Imaging: I have reviewed all pertinent imaging results/findings within the past 24 hours.

## 2024-11-13 NOTE — CARE UPDATE
11/12/24 1949   Patient Assessment/Suction   Level of Consciousness (AVPU) alert   Respiratory Effort Normal;Unlabored   Expansion/Accessory Muscles/Retractions no retractions;no use of accessory muscles   ROSINA Breath Sounds clear   LLL Breath Sounds crackles   RUL Breath Sounds clear   RML Breath Sounds crackles   RLL Breath Sounds crackles   Rhythm/Pattern, Respiratory unlabored;pattern regular;no shortness of breath reported   Cough Frequency no cough   PRE-TX-O2   Device (Oxygen Therapy) high flow nasal cannula w/device   $ Is the patient on High Flow Oxygen? Yes   Humidification temp set 33   Flow (L/min) (Oxygen Therapy) 35   Oxygen Concentration (%) 85   SpO2 97 %   Pulse Oximetry Type Intermittent   $ Pulse Oximetry - Multiple Charge Pulse Oximetry - Multiple   Aerosol Therapy   $ Aerosol Therapy Charges Aerosol Treatment   Daily Review of Necessity (SVN) completed   Respiratory Treatment Status (SVN) given   Treatment Route (SVN) in-line   Patient Position HOB elevated   Signs of Intolerance (SVN) none   Inhaler   Daily Review of Necessity (Inhaler) completed   Respiratory Treatment Status (Inhaler) given   Incentive Spirometer   $ Incentive Spirometer Charges done with encouragement   Incentive Spirometer Predicted Level (mL) 1700   Administration (IS) proper technique demonstrated   Number of Repetitions (IS) 10   Level Incentive Spirometer (mL) 600   Patient Tolerance (IS) fair;no adverse signs/symptoms present   Ready to Wean/Extubation Screen   FIO2<=50 (chart decimal) (!) 0.85   Education   $ Education Bronchodilator;15 min

## 2024-11-13 NOTE — ASSESSMENT & PLAN NOTE
I reviewed the patient's chart and discussed the case with the patient's team.      I examined Alexa Otero at bedside.  The patient maintains capacity for complex medical decision-making.  I spoke with pt, her spouse and daughter at bedside.    I introduced myself and my role as palliative care NP. They were agreeable to speaking.    We discussed the patient's medical illness, prognosis, and values in detail.  Below is a brief summary of our discussion.     Pt has a good understanding of her current hospitalization and medical illnesses. She understands she is experiencing resp failure and requiring high oxygen flow. We also spoke about her journey with lung cancer.      We discussed prognosis. I let her know I would not be surprised if she had a complication in the coming hours, days or weeks that resulted in her death. She was not surprised by this news.     We discussed her values. She is hoping that her oxygen requirements will improve and she will be able to get stronger and regain some independence. She is willing to cont with procedures and treatment for now. She feels she was living a good quality of life prior to this hospitalization and she would like to be able to get back home and enjoy spending time with her friends and family again. She finds great strength in her friends and angus.  She is also very realistic that she may not achieve these goals and that her options are limited. She does not fear dying. Her only worry is that her family will be ok after she is gone.     I did use this opportunity to discuss hospice and the philosophy of hospice care. I discussed some of the rubrics of hospice care including the availability of physicians, nurses, chaplains, social workers, and aides. I did explain that the hospice benefit generally cover medications and equipment. I also explained that hospice does not provide someone in the home 24/7. Ultimately, hospice is about living as well as you can, as long  as you can, given the circumstances of your illness. When a complication occurs, the goal will be to provide relief of suffering and allowing the patients to die naturally. This generally does not involve 911, EMS, and repeat hospitalizations.     She has a lot of trust in her oncologist and pulmonologist and would like to speak with them about what options she has left before making a decision to move towards hospice. I let her know this is a reasonable request.     I updated Dr. Christian and Dr. Cassidy.     Dr. Christian saw pt shortly after my visit. She d/w pt that she is not a candidate for further procedures and she spoke with her about transitioning to comfort focused care.     Myself and Dr. Mendes went back and revisited with pt. We spoke about her conversation with Dr. Christian. Pt understands her prognosis is poor and life expectancy is limited. She would like to focus on comfort and quality of life.     We discussed inpt hospice and she is agreeable. She has family coming in from out of town over the next several days and she would like to visit with them before transitioning to hospice/comfort care. She understands that if she experiences further decompensation then we will start hospice/comfort care sooner.     I updated pts medical team and CM. I have placed a consult for inpt hospice eval.    We appreciate being involved in pts care. We will cont to follow along. Please reach out if we can be of any assistance.

## 2024-11-13 NOTE — ASSESSMENT & PLAN NOTE
Patient was started on cefepime, doxy for atypicals, and Vanc, vanc d/c'ed with negative MRSA screen  Pulmonary and Infectious Disease following  Steroids increased by pulmonology   Antibiotic therapy complete    Antibiotics (From admission, onward)    Start     Stop Route Frequency Ordered    11/12/24 0900  sulfamethoxazole-trimethoprim 800-160mg per tablet 1 tablet         -- Oral Every other day 11/11/24 0839    11/01/24 1300  mupirocin 2 % ointment         11/06/24 0859 Nasl 2 times daily 11/01/24 1200          Microbiology Results (last 7 days)     ** No results found for the last 168 hours. **

## 2024-11-13 NOTE — ASSESSMENT & PLAN NOTE
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 Most recent hemoglobin and hematocrit are listed below.  Recent Labs     11/11/24  0509 11/12/24  0511 11/13/24  0459   HGB 8.5* 7.9* 7.5*   HCT 27.0* 24.9* 23.7*       Plan  - Monitor serial CBC: Daily  - Transfuse PRBC if patient becomes hemodynamically unstable, symptomatic or H/H drops below 7/21.  - Patient has received 1 units of PRBCs on 11/4  - Patient's anemia is currently decreasing  - T&S in AM in the event she requires a transfusion

## 2024-11-13 NOTE — CONSULTS
[facility]  Palliative Medicine  Consult Note    Patient Name: Alexa Otero  MRN: [id number]  Admission Date: 11/1/2024  Hospital Length of Stay: 12 days  Code Status: DNR   Attending Provider: Judy Birmingham MD  Consulting Provider: Sudha Briceno NP  Primary Care Physician: Idalia Greco MD  Principal Problem:Acute on chronic hypoxic respiratory failure    Patient information was obtained from patient, spouse/SO, relative(s), past medical records, ER records, and primary team.      Inpatient consult to Palliative Care  Consult performed by: Sudha Briceno NP  Consult ordered by: Susan Medellin NP        Assessment/Plan:     Palliative Care  Goals of care, counseling/discussion  I reviewed the patient's chart and discussed the case with the patient's team.      I examined Alexa Otero at bedside.  The patient maintains capacity for complex medical decision-making.  I spoke with pt, her spouse and daughter at bedside.    I introduced myself and my role as palliative care NP. They were agreeable to speaking.    We discussed the patient's medical illness, prognosis, and values in detail.  Below is a brief summary of our discussion.     Pt has a good understanding of her current hospitalization and medical illnesses. She understands she is experiencing resp failure and requiring high oxygen flow. We also spoke about her journey with lung cancer.      We discussed prognosis. I let her know I would not be surprised if she had a complication in the coming hours, days or weeks that resulted in her death. She was not surprised by this news.     We discussed her values. She is hoping that her oxygen requirements will improve and she will be able to get stronger and regain some independence. She is willing to cont with procedures and treatment for now. She feels she was living a good quality of life prior to this hospitalization and she would like to be able to get back home and  enjoy spending time with her friends and family again. She finds great strength in her friends and angus.  She is also very realistic that she may not achieve these goals and that her options are limited. She does not fear dying. Her only worry is that her family will be ok after she is gone.     I did use this opportunity to discuss hospice and the philosophy of hospice care. I discussed some of the rubrics of hospice care including the availability of physicians, nurses, chaplains, social workers, and aides. I did explain that the hospice benefit generally cover medications and equipment. I also explained that hospice does not provide someone in the home 24/7. Ultimately, hospice is about living as well as you can, as long as you can, given the circumstances of your illness. When a complication occurs, the goal will be to provide relief of suffering and allowing the patients to die naturally. This generally does not involve 911, EMS, and repeat hospitalizations.     She has a lot of trust in her oncologist and pulmonologist and would like to speak with them about what options she has left before making a decision to move towards hospice. I let her know this is a reasonable request.     I updated Dr. Christian and Dr. Cassidy.     Dr. Christian saw pt shortly after my visit. She d/w pt that she is not a candidate for further procedures and she spoke with her about transitioning to comfort focused care.     Myself and Dr. Mendes went back and revisited with pt. We spoke about her conversation with Dr. Christian. Pt understands her prognosis is poor and life expectancy is limited. She would like to focus on comfort and quality of life.     We discussed inpt hospice and she is agreeable. She has family coming in from out of town over the next several days and she would like to visit with them before transitioning to hospice/comfort care. She understands that if she experiences further decompensation then we will start  hospice/comfort care sooner.     I updated pts medical team and CM. I have placed a consult for inpt hospice eval.    We appreciate being involved in pts care. We will cont to follow along. Please reach out if we can be of any assistance.       ACP (advance care planning)  Advance Care Planning    Date: 11/13/2024    [facility]  I engaged the patient in a voluntary conversation about advance care planning and we specifically addressed what the goals of care would be moving forward, in light of the patient's change in clinical status, specifically resp failure, lung cancer.  We did specifically address the patient's likely prognosis, which is poor.  We explored the patient's values and preferences for future care.  The patient endorses that what is most important right now is to focus on comfort and QOL     Accordingly, we have decided that the best plan to meet the patient's goals includes enrolling in hospice care and pivot to comfort-focused care    Hospice  I did explain the role for hospice care at this stage of the patient's illness, including its ability to help the patient live with the best quality of life possible.  We will be making a hospice referral.              Thank you for your consult.       Subjective:     HPI:   From admission HPI:  84-year-old female with a past medical history of lung cancer actively getting chemo last of which was 5 days ago, who presented to the ER because of generalized weakness.  According to the patient, she woke up today, tried to get out of bed, but felt very weak, and slid beside the bed.  Did not hit her head, and did not lose consciousness.  She however could not get up however, and her  could not assist.  911 was called.  On arrival of EMS, patient was satting 88% on 4 L, and appeared to be short of breath.  She was brought to the ER for evaluation.   In the ER, vitals showed a blood pressure of 145/65, heart rate of 103, respiratory rate of 20, afebrile satting 60%  on 4 L.  Immediately patient was placed on non-rebreather.  CBC with white count of 16.8, and macrocytic anemia.  CMP showed hypokalemia of 3.4.  First troponin is elevated at 46.  Point of care Lactic acid was 2.1.  Blood cultures were collected.  EKG showed sinus rhythm.  Chest x-ray showed no significant change of bilateral diffuse mixed interstitial and airspace lung opacities.  CTA chest was done, and it was negative for PE, but showed extensive interstitial and airspace opacities throughout both lungs, having significantly worsened in the left lung compared to prior CT. Unchanged small to moderate sized bilateral pleural effusions.  Cefepime was ordered, and patient will be admitted to Medicine for further care of her severe sepsis.    Palliative medicine consult:  Pt currently admitted and being tx for acute hypoxemic resp failure and pneumonia. She has h/o lung cancer and was on alimta tx. Chemo currently on hold. Pts O2 requirements have increased and she is currently on vapotherm at 30 L and 85% FiO2. We have been consulted for goals of care discussion.       Hospital Course:  No notes on file    Interval History: Pt seen today for palliative medicine consult to discuss goals of care.     Past Medical History:   Diagnosis Date    Arthritis     Cardiac arrest 10/2023    Cataract     Chronic obstructive pulmonary disease, unspecified COPD type 03/20/2024    Colon polyp     Coronary artery disease 03/20/2024    Diabetes mellitus type II 2012    Encounter for blood transfusion     Extra-ovarian endometrioid adenocarcinoma 2020    GERD (gastroesophageal reflux disease)     History of chronic cough     clear productive    Hyperlipidemia     Hypertension     Macular degeneration     Ovarian cancer     PONV (postoperative nausea and vomiting)     Squamous cell carcinoma 1980's    precancer of cervix       Past Surgical History:   Procedure Laterality Date    AUGMENTATION OF BREAST      BRONCHOSCOPY N/A 12/12/2023     Procedure: BRONCHOSCOPY;  Surgeon: Neva Christian MD;  Location: [facility] ENDO;  Service: ENT;  Laterality: N/A;    BRONCHOSCOPY WITH FLUOROSCOPY Right 05/11/2023    Procedure: BRONCHOSCOPY, WITH FLUOROSCOPY;  Surgeon: Neva Christian MD;  Location: [facility] ENDO;  Service: Pulmonary;  Laterality: Right;    BRONCHOSCOPY WITH FLUOROSCOPY N/A 12/15/2023    Procedure: BRONCHOSCOPY, WITH FLUOROSCOPY;  Surgeon: Neva Christian MD;  Location: [facility] ENDO;  Service: Pulmonary;  Laterality: N/A;    CATARACT EXTRACTION BILATERAL W/ ANTERIOR VITRECTOMY Bilateral     CHOLECYSTECTOMY      COLONOSCOPY      COLONOSCOPY N/A 04/20/2023    Procedure: COLONOSCOPY;  Surgeon: Sabino Stephenson MD;  Location: [facility] ENDO;  Service: Endoscopy;  Laterality: N/A;    CORONARY STENT PLACEMENT  10/2023    x 3    ESOPHAGOGASTRODUODENOSCOPY N/A 06/20/2018    Procedure: EGD (ESOPHAGOGASTRODUODENOSCOPY);  Surgeon: Sabino Stephenson MD;  Location: [facility] ENDO;  Service: Endoscopy;  Laterality: N/A;    ESOPHAGOGASTRODUODENOSCOPY N/A 03/31/2023    Procedure: EGD (ESOPHAGOGASTRODUODENOSCOPY);  Surgeon: Sabino Stephenson MD;  Location: [facility];  Service: Endoscopy;  Laterality: N/A;    ESOPHAGOGASTRODUODENOSCOPY N/A 12/11/2023    Procedure: EGD (ESOPHAGOGASTRODUODENOSCOPY);  Surgeon: Pretty Salgado MD;  Location: [facility] ENDO;  Service: Endoscopy;  Laterality: N/A;    HYSTERECTOMY  1976    partial    INJECTION OF ANESTHETIC AGENT AROUND MEDIAL BRANCH NERVES INNERVATING LUMBAR FACET JOINT Right 05/10/2023    Procedure: Block-nerve-Lateral-branch-lumbar;  Surgeon: Agustin Robles MD;  Location: [facility] OR;  Service: Pain Management;  Laterality: Right;  L5 and s1,s2 LBB    INJECTION OF ANESTHETIC AGENT AROUND MEDIAL BRANCH NERVES INNERVATING LUMBAR FACET JOINT Right 05/30/2023    Procedure: Block-nerve-medial branch-lumbar;  Surgeon: Agustin Robles MD;  Location: [facility] OR;  Service: Pain Management;  Laterality: Right;  L5, s1 ,s2 LBB #2    INJECTION OF  ANESTHETIC AGENT AROUND NERVE Right 10/31/2023    Procedure: INTERCOSTAL NERVE BLOCK;  Surgeon: Washington Shankar MD;  Location: [facility] OR;  Service: Cardiothoracic;  Laterality: Right;    INJECTION, SACROILIAC JOINT Right 01/26/2023    Procedure: INJECTION,SACROILIAC JOINT;  Surgeon: Agustin Robles MD;  Location: [facility] OR;  Service: Pain Management;  Laterality: Right;    INSERTION OF TUNNELED CENTRAL VENOUS CATHETER (CVC) WITH SUBCUTANEOUS PORT Right 10/19/2020    Procedure: INSERTION, PORT-A-CATH;  Surgeon: Misti Mcfarland MD;  Location: [facility] OR;  Service: General;  Laterality: Right;    INTRAMEDULLARY RODDING OF TROCHANTER OF FEMUR Right 11/28/2021    Procedure: INSERTION, INTRAMEDULLARY LUC, FEMUR, TROCHANTER/RIGHT TFN DR FAJARDO NOTIFIED REP;  Surgeon: Kp Fajardo MD;  Location: [facility];  Service: Orthopedics;  Laterality: Right;  SKIP    ROBOT-ASSISTED LAPAROSCOPIC LYMPHADENECTOMY USING DA NELLA XI N/A 09/21/2020    Procedure: XI ROBOTIC LYMPHADENECTOMY-pelvic and kell-aortic;  Surgeon: Altagracia Ray MD;  Location: [facility] OR;  Service: OB/GYN;  Laterality: N/A;    ROBOT-ASSISTED LAPAROSCOPIC OMENTECTOMY USING DA NELLA XI N/A 09/21/2020    Procedure: XI ROBOTIC OMENTECTOMY;  Surgeon: Altagracia Ray MD;  Location: [facility] OR;  Service: OB/GYN;  Laterality: N/A;    ROBOT-ASSISTED LAPAROSCOPIC SALPINGO-OOPHORECTOMY USING DA NELLA XI Bilateral 09/21/2020    Procedure: XI ROBOTIC SALPINGO-OOPHORECTOMY;  Surgeon: Altagracia Ray MD;  Location: [facility] OR;  Service: OB/GYN;  Laterality: Bilateral;    THORACOSCOPIC BIOPSY OF PLEURA Right 10/31/2023    Procedure: VATS, WITH PLEURA BIOPSY;  Surgeon: Washington Shankar MD;  Location: [facility] OR;  Service: Cardiothoracic;  Laterality: Right;  FROZEN SECTION   **KRISTEN-ASSISDT**    THORACOTOMY Right 10/31/2023    Procedure: THORACOTOMY;  Surgeon: Washington Shankar MD;  Location: [facility] OR;  Service: Cardiothoracic;  Laterality: Right;    UPPER GASTROINTESTINAL  ENDOSCOPY         Review of patient's allergies indicates:  No Known Allergies    Medications:  Continuous Infusions:  Scheduled Meds:   albuterol-ipratropium  3 mL Nebulization Q6H WAKE    amiodarone  200 mg Oral BID    apixaban  2.5 mg Oral BID    electrolytes-dextrose  200 mL Oral Q4H    folic acid  1 mg Oral Daily    gabapentin  100 mg Oral BID    insulin aspart U-100  5 Units Subcutaneous TIDWM    magnesium oxide  400 mg Oral Daily    methylPREDNISolone injection (PEDS and ADULTS)  60 mg Intravenous Q6H    metoprolol succinate  25 mg Oral Daily    pantoprazole  40 mg Oral Daily    polyethylene glycol  17 g Oral Daily    sucralfate  1 g Oral QID    sulfamethoxazole-trimethoprim 800-160mg  1 tablet Oral Every other day    tamsulosin  0.4 mg Oral Daily     PRN Meds:  Current Facility-Administered Medications:     0.9%  NaCl infusion (for blood administration), , Intravenous, Q24H PRN    acetaminophen, 650 mg, Oral, Q8H PRN    acetaminophen, 650 mg, Oral, Q4H PRN    aluminum-magnesium hydroxide-simethicone, 30 mL, Oral, QID PRN    dextrose 50%, 12.5 g, Intravenous, PRN    dextrose 50%, 25 g, Intravenous, PRN    glucagon (human recombinant), 1 mg, Intramuscular, PRN    glucose, 16 g, Oral, PRN    glucose, 24 g, Oral, PRN    HYDROcodone-acetaminophen, 1 tablet, Oral, Q6H PRN    insulin aspart U-100, 0-10 Units, Subcutaneous, QID (AC + HS) PRN    lactulose, 20 g, Oral, Q6H PRN    LORazepam, 0.5 mg, Oral, Q8H PRN    magnesium oxide, 800 mg, Oral, PRN    magnesium oxide, 800 mg, Oral, PRN    melatonin, 6 mg, Oral, Nightly PRN    naloxone, 0.02 mg, Intravenous, PRN    ondansetron, 4 mg, Intravenous, Q6H PRN    potassium bicarbonate, 35 mEq, Oral, PRN    potassium bicarbonate, 50 mEq, Oral, PRN    potassium bicarbonate, 60 mEq, Oral, PRN    potassium, sodium phosphates, 2 packet, Oral, PRN    potassium, sodium phosphates, 2 packet, Oral, PRN    potassium, sodium phosphates, 2 packet, Oral, PRN    sodium chloride 0.9%, 2  mL, Intravenous, Q12H PRN    Family History       Problem Relation (Age of Onset)    Breast cancer Maternal Grandmother, Paternal Grandmother    Cancer Mother (57), Father (56)    Colon polyps Daughter    Melanoma Brother    Skin cancer Sister, Brother, Brother          Tobacco Use    Smoking status: Former     Current packs/day: 0.00     Average packs/day: 1 pack/day for 20.0 years (20.0 ttl pk-yrs)     Types: Cigarettes     Start date:      Quit date:      Years since quittin.8    Smokeless tobacco: Never    Tobacco comments:     quit 40 yrs ago   Substance and Sexual Activity    Alcohol use: Yes     Alcohol/week: 1.0 standard drink of alcohol     Types: 1 Glasses of wine per week     Comment: occasional    Drug use: No    Sexual activity: Not Currently     Partners: Male       Review of Systems   Constitutional:  Positive for activity change and fatigue.   Respiratory:  Positive for shortness of breath.    All other systems reviewed and are negative.    Objective:     Vital Signs (Most Recent):  Temp: 97.5 °F (36.4 °C) (24 0930)  Pulse: 71 (24 0930)  Resp: 18 (24 0759)  BP: 113/60 (24 0830)  SpO2: 97 % (24 0930) Vital Signs (24h Range):  Temp:  [97 °F (36.1 °C)-98.4 °F (36.9 °C)] 97.5 °F (36.4 °C)  Pulse:  [71-81] 71  Resp:  [16-20] 18  SpO2:  [94 %-100 %] 97 %  BP: ()/(46-66) 113/60     Weight: 49.5 kg (109 lb 2 oz)  Body mass index is 18.73 kg/m².       Physical Exam  Vitals and nursing note reviewed.   Constitutional:       General: She is not in acute distress.     Appearance: She is ill-appearing.   HENT:      Mouth/Throat:      Mouth: Mucous membranes are moist.   Eyes:      General: Scleral icterus present.      Pupils: Pupils are equal, round, and reactive to light.   Cardiovascular:      Rate and Rhythm: Normal rate and regular rhythm.      Pulses: Normal pulses.   Pulmonary:      Effort: No respiratory distress.      Comments: Mild tachypnea when  speaking, able to complete full sentences  Skin:     General: Skin is warm and dry.   Neurological:      General: No focal deficit present.      Mental Status: She is alert and oriented to person, place, and time.   Psychiatric:         Mood and Affect: Mood normal.         Behavior: Behavior normal.         Thought Content: Thought content normal.         Judgment: Judgment normal.            Review of Symptoms      Symptom Assessment (ESAS 0-10 Scale)  Pain:  0  Dyspnea:  5  Anxiety:  0  Nausea:  0  Depression:  0  Anorexia:  0  Fatigue:  7  Insomnia:  0  Restlessness:  0  Agitation:  0         Performance Status:  40    Living Arrangements:  Lives with spouse    Psychosocial/Cultural:   See Palliative Psychosocial Note: No  **Primary  to Follow**  Palliative Care  Consult: No        Advance Care Planning  Advance Directives:   Living Will: Yes        Copy on chart: Yes    Do Not Resuscitate Status: Yes    Medical Power of : Yes      Decision Making:  Patient answered questions  Goals of Care: The patient endorses that what is most important right now is to focus on remaining as independent as possible, symptom/pain control, and quality of life, even if it means sacrificing a little time    Accordingly, we have decided that the best plan to meet the patient's goals includes continuing with treatment         Significant Labs: All pertinent labs within the past 24 hours have been reviewed.  CBC:   Recent Labs   Lab 11/13/24 0459   WBC 16.97*   HGB 7.5*   HCT 23.7*   *   *     BMP:  Recent Labs   Lab 11/13/24 0459   *      K 4.7   CL 99   CO2 35*   BUN 51*   CREATININE 1.4   CALCIUM 7.9*   MG 1.7     LFT:  Lab Results   Component Value Date    AST 10 11/13/2024    ALKPHOS 44 (L) 11/13/2024    BILITOT 0.3 11/13/2024     Albumin:   Albumin   Date Value Ref Range Status   11/13/2024 2.4 (L) 3.5 - 5.2 g/dL Final     Protein:   Total Protein   Date Value Ref  Range Status   11/13/2024 4.9 (L) 6.0 - 8.4 g/dL Final     Lactic acid:   Lab Results   Component Value Date    LACTATE 0.7 11/01/2024    LACTATE 0.7 12/10/2023       Significant Imaging: I have reviewed all pertinent imaging results/findings within the past 24 hours.      I spent a total of 75 minutes on the day of the visit. This includes face to face time in discussion of goals of care, symptom assessment, coordination of care and emotional support.  This also includes non-face to face time preparing to see the patient (eg, review of tests/imaging), obtaining and/or reviewing separately obtained history, documenting clinical information in the electronic or other health record, independently interpreting results and communicating results to the patient/family/caregiver, or care coordinator.    I spent an additional 25 minute discussing advance care planning.     Sudha Briceno, NP  Palliative Medicine  Formerly [facility], later [facility]

## 2024-11-13 NOTE — ASSESSMENT & PLAN NOTE
Advance Care Planning     Date: 11/13/2024    [facility]  I engaged the patient in a voluntary conversation about advance care planning and we specifically addressed what the goals of care would be moving forward, in light of the patient's change in clinical status, specifically resp failure, lung cancer.  We did specifically address the patient's likely prognosis, which is poor.  We explored the patient's values and preferences for future care.  The patient endorses that what is most important right now is to focus on comfort and QOL     Accordingly, we have decided that the best plan to meet the patient's goals includes enrolling in hospice care and pivot to comfort-focused care    Hospice  I did explain the role for hospice care at this stage of the patient's illness, including its ability to help the patient live with the best quality of life possible.  We will be making a hospice referral.

## 2024-11-13 NOTE — SUBJECTIVE & OBJECTIVE
Interval History: On high flow 35% at time of my assessment.  She has been seen by palliative care today and is considering IP hospice.  She has family coming to visit from out of town and would like the opportunity to see them prior to withdrawing any care; however, we will do what is necessary to keep her comfortable meanwhile.    Review of Systems   Constitutional:  Positive for fatigue. Negative for chills and fever.   Respiratory:  Positive for shortness of breath. Negative for cough.    Cardiovascular: Negative.    Gastrointestinal: Negative.    Genitourinary: Negative.    Skin: Negative.    Neurological:  Positive for weakness.     Objective:     Vital Signs (Most Recent):  Temp: 97.8 °F (36.6 °C) (11/13/24 1328)  Pulse: 77 (11/13/24 1328)  Resp: (!) 22 (11/13/24 1307)  BP: (!) 108/52 (11/13/24 1328)  SpO2: (!) 94 % (11/13/24 1328) Vital Signs (24h Range):  Temp:  [97 °F (36.1 °C)-98.4 °F (36.9 °C)] 97.8 °F (36.6 °C)  Pulse:  [68-81] 77  Resp:  [16-22] 22  SpO2:  [93 %-100 %] 94 %  BP: ()/(46-66) 108/52     Weight: 49.5 kg (109 lb 2 oz)  Body mass index is 18.73 kg/m².    Intake/Output Summary (Last 24 hours) at 11/13/2024 1446  Last data filed at 11/13/2024 1241  Gross per 24 hour   Intake 1120 ml   Output 750 ml   Net 370 ml         Physical Exam  Vitals and nursing note reviewed. Exam conducted with a chaperone present.   Constitutional:       Interventions: Nasal cannula in place.      Comments: Chronically ill appearing   HENT:      Head: Normocephalic and atraumatic.      Nose: Nose normal.      Mouth/Throat:      Mouth: Mucous membranes are moist.      Pharynx: Oropharynx is clear.   Eyes:      Pupils: Pupils are equal, round, and reactive to light.   Cardiovascular:      Rate and Rhythm: Normal rate.   Pulmonary:      Effort: Tachypnea present.      Breath sounds: Decreased breath sounds present.   Abdominal:      General: There is distension.      Palpations: Abdomen is soft.   Musculoskeletal:          General: Normal range of motion.      Cervical back: Normal range of motion and neck supple.      Right lower leg: No edema.      Left lower leg: No edema.   Skin:     General: Skin is warm.      Capillary Refill: Capillary refill takes 2 to 3 seconds.      Comments: Multiple areas of ecchymosis to BLE   Neurological:      General: No focal deficit present.      Mental Status: She is alert and oriented to person, place, and time.   Psychiatric:         Mood and Affect: Mood normal. Affect is tearful.         Behavior: Behavior normal.             Significant Labs: All pertinent labs within the past 24 hours have been reviewed.  CBC:   Recent Labs   Lab 11/12/24 0511 11/13/24 0459   WBC 10.84 16.97*   HGB 7.9* 7.5*   HCT 24.9* 23.7*   * 137*     CMP:   Recent Labs   Lab 11/12/24 0511 11/13/24 0459    138   K 4.8 4.7   CL 97 99   CO2 33* 35*   * 240*   BUN 48* 51*   CREATININE 1.3 1.4   CALCIUM 8.6* 7.9*   PROT 5.3* 4.9*   ALBUMIN 2.6* 2.4*   BILITOT 0.3 0.3   ALKPHOS 45* 44*   AST 10 10   ALT 15 16   ANIONGAP 6* 4*       Significant Imaging: I have reviewed all pertinent imaging results/findings within the past 24 hours.

## 2024-11-13 NOTE — PROGRESS NOTES
Pulmonary/Critical Care Progress Note      PATIENT NAME: Alexa Otero  MRN: [id number]  TODAY'S DATE: 2024  11:55 AM  ADMIT DATE: 2024  AGE: 84 y.o. 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    She reports progressive fatigue weakness prior to admission to the hospital.  She reports that she slid out of her bed and that is the reason she came to the hospital.  She reports that she has not had any fever cough night sweats, but she has some worsening shortness of breath.    11/3   - rough night, had worsening shortness of breath and is required non-rebreather in addition no nasal cannula currently she is on high-flow at 10 liters/minute     the patient is feeling a little better today.  She is on 15 L high-flow nasal cannula.  Her CT shows her chronically infiltrated right lung with the surrounding effusion.  Her left lung which was normal looking 2 weeks ago now is diffusely infiltrated with ground-glass opacities.  This must be the Alimta.     the patient is improving with the steroids.  She looks much brighter today.  She is feeling better.  She is on 10 L high-flow nasal cannula.     the patient is very sleepy today.  The nurse reports she was up earlier and ate breakfast and sat up.  Her oxygen is set on 10 L.  Her sats remain 99% on 5 L. I have asked the nurse to try to wean this.     the patient is looking good today.  She is down to 3 L nasal cannula.  Her chest x-ray is improving but her left thorax is not yet normal. We need to get her priest out and her walking.     the patient is too  weak to participate with PT she says.  She also states that it makes her very short of breath.  Her saturations are difficult to .  Urology would like to leave the Rausch in.  Obviously, the patient is going to need to go to a nursing home from here.  She states she will be here over the weekend.    11/9 the patient states physical therapy has not yet come to see her today.  States she is feeling good from the waist up.  She is on 4 L nasal cannula.    11/10 the patient was not seen by physical therapy yesterday.  This is a shame.  She is concerned about still having a Rausch catheter.  Explained that Dr. Jon isn't coming to the hospital to see her, but she needs to get to him to be evaluated. The patient's oxygen requirements are higher again today.  Will increase her prednisone to 60 mg and see if we can not stabilize this.      11/11 .  The patient's breathing is not as good.  She is dyspneic with speech.  Will resume Solu-Medrol.  Have added Bactrim DS q.o.d. for PCP prevention.    11/12 The patient is now requiring nearly maximum vapotherm to oxygenate.  Her CXR is not appreciably changed.  Have pushed her back to 60 q 6 of solumedrol. Discussed with Dr. Cassidy the utility of bronchoscopy.  Given her compromised state she would need to be intubated and I don't know if I could get her extubated. Unfortunately, all we have to offer is steroids and will see if she turns around again.     11/13 the patient is on 30 L and 85% Vapotherm.  Looks very fatigued.  She had multiple questions after being visited by Sudha from palliative care.  Obviously, the patient can not go home.  We have no way to get enough oxygen to her.  I think that if she is not better by tomorrow we should choose hospice and then the 2 choices will be to try to get her to an LTAC with hospice or to do inpatient hospice here and withdraw care.  Discussed the patient, her , and her daughter.    REVIEW OF SYSTEMS  General:  Feeling weak  Eyes: Vision is good.  ENT:  No sinusitis or pharyngitis.   Heart: No chest pain or palpitations.  Lungs:  Feeling more short of breath.  GI: No Nausea, vomiting, constipation, diarrhea, or reflux.  : No dysuria, hesitancy, or nocturia.  Skin: No lesions or rashes.  Musculoskeletal:  Very weak..  Neuro: No headaches or neuropathy.  Lymph: No edema or adenopathy.  Psych: No anxiety or depression.  Endo: No weight change.        No change in the patient's Past Medical History, Past Surgical History, Social History or Family History since admission.    VITAL SIGNS (MOST RECENT)  Temp: 97.5 °F (36.4 °C) (11/13/24 0930)  Pulse: 71 (11/13/24 0930)  Resp: 18 (11/13/24 0759)  BP: 113/60 (11/13/24 0830)  SpO2: 97 % (11/13/24 0930)    INTAKE AND OUTPUT (LAST 24 HOURS):  Intake/Output Summary (Last 24 hours) at 11/13/2024 1043  Last data filed at 11/13/2024 0518  Gross per 24 hour   Intake 1420 ml   Output 1250 ml   Net 170 ml       WEIGHT  Wt Readings from Last 1 Encounters:   11/01/24 49.5 kg (109 lb 2 oz)         PHYSICAL EXAM  GENERAL: Older patient in no distress, looking frail.  HEENT: Pupils equal and reactive. Extraocular movements intact. Nose intact.  Pharynx moist.  NECK: Supple.   HEART: Regular rate and rhythm. No murmur or gallop auscultated.  LUNGS:  There are diminished breath sounds on the right.  On the left there are diffuse crackles anteriorly and posteriorly. Lung excursion symmetrical. No change in fremitus.  There is a port in thorax which is accessed.  ABDOMEN: Bowel sounds present. Non-tender, no masses palpated.  : Normal anatomy. Rausch with clear urine.  EXTREMITIES: Normal muscle tone and joint movement, no cyanosis or clubbing.  She is very weak.  LYMPHATICS: No adenopathy palpated, no edema.  SKIN: Dry, intact, no lesions.  There is a stage I decubitus on the buttock.    NEURO: Cranial nerves II-XII intact. Motor strength 5/5 bilaterally, upper and lower extremities.  PSYCH:  Appropriate affect.        CBC LAST (LAST 24 HOURS)  Recent Labs   Lab 11/13/24  0459   WBC 16.97*   RBC 2.30*   HGB 7.5*   HCT 23.7*   *   MCH 32.6*   MCHC 31.6*   RDW 18.0*   *   MPV 10.7   GRAN 94.1*  16.0*   LYMPH 1.1*  0.2*   MONO 3.5*  0.6   BASO 0.01   NRBC 0       CHEMISTRY LAST (LAST 24 HOURS)  Recent Labs   Lab 11/13/24  0459      K 4.7   CL 99   CO2 35*   ANIONGAP 4*   BUN 51*   CREATININE 1.4   *   CALCIUM 7.9*   MG 1.7   ALBUMIN 2.4*   PROT 4.9*   ALKPHOS 44*   ALT 16   AST 10   BILITOT 0.3         LAST 7 DAYS MICROBIOLOGY   Microbiology Results (last 7 days)       ** No results found for the last 168 hours. **            MOST RECENT IMAGING  X-Ray Chest AP Portable  Narrative: EXAMINATION:  XR CHEST AP PORTABLE    CLINICAL HISTORY:  pneumonitis;    FINDINGS:  Portable chest radiograph at 06:00 hours compared to numerous prior exams including 11/10/2024 shows no significant interval change.  Impression: No significant interval change.    Electronically signed by: Davide Jeffrey  Date:    11/11/2024  Time:    09:04      CURRENT VISIT EKG  Results for orders placed or performed during the hospital encounter of 11/01/24   EKG 12-lead   Result Value Ref Range    QRS Duration 56 ms    OHS QTC Calculation 482 ms    Narrative    Test Reason : R06.02,    Vent. Rate : 097 BPM     Atrial Rate : 097 BPM     P-R Int : 156 ms          QRS Dur : 056 ms      QT Int : 380 ms       P-R-T Axes : 044 -06 100 degrees     QTc Int : 482 ms    Normal sinus rhythm  Low voltage QRS  Septal infarct (cited on or before 13-OCT-2023)  Abnormal ECG  When compared with ECG of 25-OCT-2024 10:48,  ST now depressed in Lateral leads    Referred By: [email or web address]   SELF           Confirmed By:        ECHOCARDIOGRAM RESULTS  Results for orders placed during the hospital encounter of 10/07/24    Echo    Interpretation Summary    Left Ventricle: The left ventricle is normal in size. Mildly increased wall thickness.  There is mild concentric hypertrophy. Moderate global hypokinesis present. There is mildly reduced systolic function with a visually estimated ejection fraction of 40 - 45%. There is normal diastolic function.    Right Ventricle: Normal right ventricular cavity size. Wall thickness is normal. Systolic function is normal.    Left Atrium: Left atrium is moderately dilated.    Mitral Valve: There is bileaflet sclerosis. There is moderate mitral annular calcification present. There is moderate stenosis. The mean pressure gradient across the mitral valve is 8 mmHg at a heart rate of  bpm. There is mild regurgitation with a centrally directed jet.    Pulmonary Artery: The estimated pulmonary artery systolic pressure is 29 mmHg.    IVC/SVC: Normal venous pressure at 3 mmHg.        Oxygen INFORMATION  Oxygen Concentration (%):  [85-90] 85   Vapotherms 35l/min and 90%           PLAN:.      Pneumonitis involving the left lung  - oxygenation is quite despite changing prednisone to solumedrol  - patient has no signs or symptoms of pneumonia  - this was not there 2 weeks ago making progression of her cancer less likely but possible especially given the apparent tumor in the airway and the way the tumor recurred after her lobectomy  - most likely the Alimta  - Continue duo nebs   - continue Solu-Medrol  - add bactrim DS qod  - Continue her oxygen to maintain sats of 90%  - The patient is DNR/DNI which is appropriate  Lepidic adenocarcinoma  - involving the remainder of the right lung  - there appears to be endobronchial disease now as well on the right  Pleural effusion  - persisting and circumferential  Emphysema  - continuing bronchodilators  Acute on chronic hypoxemic respiratory failure  - on 30 liters and 80%  Thrombocytopenia  - improved  - heme Onc following  Moderate hypoalbuminemia  - encouraged patient to eat her protein    Discussed her severe Respiratory failure which is present because of the disease in the left  lung.  Her right lung is basically nonfunctional.  This is either her cancer or her Alimta.  Bronch in her would leave her stuck on a ventilator.  Will continue on this path for another 24 hours and if not better tomorrow will need to discuss either going to an LTAC with hospice for whatever time she has or doing inpatient hospice here with withdrawal of care.  Discussed with the patient and her  and her daughter and updated the nurse.        Neva Christian MD  Date of Service: 11/13/2024  11:55 AM

## 2024-11-13 NOTE — HPI
From admission HPI:  84-year-old female with a past medical history of lung cancer actively getting chemo last of which was 5 days ago, who presented to the ER because of generalized weakness.  According to the patient, she woke up today, tried to get out of bed, but felt very weak, and slid beside the bed.  Did not hit her head, and did not lose consciousness.  She however could not get up however, and her  could not assist.  911 was called.  On arrival of EMS, patient was satting 88% on 4 L, and appeared to be short of breath.  She was brought to the ER for evaluation.   In the ER, vitals showed a blood pressure of 145/65, heart rate of 103, respiratory rate of 20, afebrile satting 60% on 4 L.  Immediately patient was placed on non-rebreather.  CBC with white count of 16.8, and macrocytic anemia.  CMP showed hypokalemia of 3.4.  First troponin is elevated at 46.  Point of care Lactic acid was 2.1.  Blood cultures were collected.  EKG showed sinus rhythm.  Chest x-ray showed no significant change of bilateral diffuse mixed interstitial and airspace lung opacities.  CTA chest was done, and it was negative for PE, but showed extensive interstitial and airspace opacities throughout both lungs, having significantly worsened in the left lung compared to prior CT. Unchanged small to moderate sized bilateral pleural effusions.  Cefepime was ordered, and patient will be admitted to Medicine for further care of her severe sepsis.    Palliative medicine consult:  Pt currently admitted and being tx for acute hypoxemic resp failure and pneumonia. She has h/o lung cancer and was on alimta tx. Chemo currently on hold. Pts O2 requirements have increased and she is currently on vapotherm at 30 L and 85% FiO2. We have been consulted for goals of care discussion.

## 2024-11-13 NOTE — PLAN OF CARE
SW met with pt at bedside. Pt states she understands she cannot go anywhere due to her O2 needs and she is just wanting to say goodbye to her family this weekend. Per pt she is tired and wants to start GIP next week.

## 2024-11-14 NOTE — ASSESSMENT & PLAN NOTE
See palliative care consult  Patient and spouse would like comfort measures but not until their family arrives.

## 2024-11-14 NOTE — PROGRESS NOTES
[facility]  Palliative Medicine  Progress Note    Patient Name: Alexa Otero  MRN: [id number]  Admission Date: 11/1/2024  Hospital Length of Stay: 13 days  Code Status: DNR   Attending Provider: Judy Birmingham MD  Consulting Provider: Sudha Briceno NP  Primary Care Physician: Idalia Greco MD  Principal Problem:Acute on chronic hypoxic respiratory failure    Patient information was obtained from patient, relative(s), past medical records, ER records, and primary team.      Assessment/Plan:     Palliative Care  Goals of care, counseling/discussion  I reviewed the patient's chart and discussed the case with the patient's team.      I examined Alexa Otero at bedside.  The patient maintains capacity for complex medical decision-making.  I spoke with pt and family members at .    I introduced myself and my role as palliative care NP. They were agreeable to speaking.    She recalls my visit from yesterday. She is in good spirits today. Her family and friends have been coming to visit with her. She is getting her affairs in order.     Her symptoms are currently managed.     Her goals remain clear that she wants to transition to comfort focused care after this weekend. She has several more family members and friends coming to visit her. She would also like some more time to finish getting her affairs in order.     I spent some time and provided emotional support.     Will cont with supportive care.     We appreciate being involved in pts care. We will cont to follow along. Please reach out if we can be of any assistance.                Subjective:     Chief Complaint:   Chief Complaint   Patient presents with    Generalized Weakness     Per EMS, patient slipped out of bed onto floor hitting bottom. Did not hit head. Realized she was weaker than normal. Hx lung cancer. Sating 60s  on 4L in triage.       HPI:   From admission HPI:  84-year-old female with a past medical history of lung  cancer actively getting chemo last of which was 5 days ago, who presented to the ER because of generalized weakness.  According to the patient, she woke up today, tried to get out of bed, but felt very weak, and slid beside the bed.  Did not hit her head, and did not lose consciousness.  She however could not get up however, and her  could not assist.  911 was called.  On arrival of EMS, patient was satting 88% on 4 L, and appeared to be short of breath.  She was brought to the ER for evaluation.   In the ER, vitals showed a blood pressure of 145/65, heart rate of 103, respiratory rate of 20, afebrile satting 60% on 4 L.  Immediately patient was placed on non-rebreather.  CBC with white count of 16.8, and macrocytic anemia.  CMP showed hypokalemia of 3.4.  First troponin is elevated at 46.  Point of care Lactic acid was 2.1.  Blood cultures were collected.  EKG showed sinus rhythm.  Chest x-ray showed no significant change of bilateral diffuse mixed interstitial and airspace lung opacities.  CTA chest was done, and it was negative for PE, but showed extensive interstitial and airspace opacities throughout both lungs, having significantly worsened in the left lung compared to prior CT. Unchanged small to moderate sized bilateral pleural effusions.  Cefepime was ordered, and patient will be admitted to Medicine for further care of her severe sepsis.    Palliative medicine consult:  Pt currently admitted and being tx for acute hypoxemic resp failure and pneumonia. She has h/o lung cancer and was on alimta tx. Chemo currently on hold. Pts O2 requirements have increased and she is currently on vapotherm at 30 L and 85% FiO2. We have been consulted for goals of care discussion.       Hospital Course:  No notes on file    Interval History: Pt doing well today. She conts on vapotherm. Family present at BS.    Medications:  Continuous Infusions:  Scheduled Meds:   albuterol-ipratropium  3 mL Nebulization Q6H WAKE     amiodarone  200 mg Oral BID    apixaban  2.5 mg Oral BID    electrolytes-dextrose  200 mL Oral Q4H    folic acid  1 mg Oral Daily    gabapentin  100 mg Oral BID    insulin aspart U-100  5 Units Subcutaneous TIDWM    magnesium oxide  400 mg Oral Daily    methylPREDNISolone injection (PEDS and ADULTS)  60 mg Intravenous Q6H    metoprolol succinate  25 mg Oral Daily    pantoprazole  40 mg Oral Daily    polyethylene glycol  17 g Oral Daily    sucralfate  1 g Oral QID    sulfamethoxazole-trimethoprim 800-160mg  1 tablet Oral Every other day    tamsulosin  0.4 mg Oral Daily     PRN Meds:  Current Facility-Administered Medications:     0.9%  NaCl infusion (for blood administration), , Intravenous, Q24H PRN    acetaminophen, 650 mg, Oral, Q8H PRN    acetaminophen, 650 mg, Oral, Q4H PRN    aluminum-magnesium hydroxide-simethicone, 30 mL, Oral, QID PRN    dextrose 50%, 12.5 g, Intravenous, PRN    dextrose 50%, 25 g, Intravenous, PRN    glucagon (human recombinant), 1 mg, Intramuscular, PRN    glucose, 16 g, Oral, PRN    glucose, 24 g, Oral, PRN    HYDROcodone-acetaminophen, 1 tablet, Oral, Q6H PRN    insulin aspart U-100, 0-10 Units, Subcutaneous, QID (AC + HS) PRN    lactulose, 20 g, Oral, Q6H PRN    LORazepam, 0.5 mg, Oral, Q8H PRN    magnesium oxide, 800 mg, Oral, PRN    magnesium oxide, 800 mg, Oral, PRN    melatonin, 6 mg, Oral, Nightly PRN    naloxone, 0.02 mg, Intravenous, PRN    ondansetron, 4 mg, Intravenous, Q6H PRN    potassium bicarbonate, 35 mEq, Oral, PRN    potassium bicarbonate, 50 mEq, Oral, PRN    potassium bicarbonate, 60 mEq, Oral, PRN    potassium, sodium phosphates, 2 packet, Oral, PRN    potassium, sodium phosphates, 2 packet, Oral, PRN    potassium, sodium phosphates, 2 packet, Oral, PRN    sodium chloride 0.9%, 2 mL, Intravenous, Q12H PRN    Objective:     Vital Signs (Most Recent):  Temp: 97.5 °F (36.4 °C) (11/14/24 1130)  Pulse: 69 (11/14/24 1130)  Resp: 17 (11/14/24 0745)  BP: 123/65 (11/14/24  1130)  SpO2: 100 % (11/14/24 1200) Vital Signs (24h Range):  Temp:  [97.5 °F (36.4 °C)-98 °F (36.7 °C)] 97.5 °F (36.4 °C)  Pulse:  [67-78] 69  Resp:  [16-20] 17  SpO2:  [94 %-100 %] 100 %  BP: (121-128)/(57-69) 123/65     Weight: 49.5 kg (109 lb 2 oz)  Body mass index is 18.73 kg/m².       Physical Exam  Vitals and nursing note reviewed.   Constitutional:       General: She is not in acute distress.     Appearance: She is ill-appearing.   HENT:      Mouth/Throat:      Mouth: Mucous membranes are moist.   Eyes:      General: No scleral icterus.     Pupils: Pupils are equal, round, and reactive to light.   Cardiovascular:      Rate and Rhythm: Normal rate and regular rhythm.      Pulses: Normal pulses.   Pulmonary:      Effort: No respiratory distress.      Comments: Mild tachypnea when speaking, able to complete full sentences  Skin:     General: Skin is warm and dry.   Neurological:      General: No focal deficit present.      Mental Status: She is alert and oriented to person, place, and time.   Psychiatric:         Mood and Affect: Mood normal.         Behavior: Behavior normal.         Thought Content: Thought content normal.         Judgment: Judgment normal.            Review of Symptoms      Symptom Assessment (ESAS 0-10 Scale)  Pain:  0  Dyspnea:  5  Anxiety:  0  Nausea:  0  Depression:  0  Anorexia:  0  Fatigue:  5  Insomnia:  0  Restlessness:  0  Agitation:  0         Performance Status:  30    Living Arrangements:  Lives with spouse    Psychosocial/Cultural:   See Palliative Psychosocial Note: No  **Primary  to Follow**  Palliative Care  Consult: No        Advance Care Planning  Advance Directives:   Living Will: Yes        Copy on chart: Yes    Do Not Resuscitate Status: Yes    Medical Power of : Yes      Decision Making:  Patient answered questions  Goals of Care: What is most important right now is to focus on comfort and QOL . Accordingly, we have decided that the best  plan to meet the patient's goals includes enrolling in hospice care.         Significant Labs: All pertinent labs within the past 24 hours have been reviewed.  CBC:   Recent Labs   Lab 11/14/24  0553   WBC 16.97*   HGB 7.3*   HCT 23.3*   *        BMP:  Recent Labs   Lab 11/14/24  0553   *   *   K 5.0   CL 97   CO2 32*   BUN 54*   CREATININE 1.4   CALCIUM 8.0*   MG 1.7     LFT:  Lab Results   Component Value Date    AST 10 11/14/2024    ALKPHOS 43 (L) 11/14/2024    BILITOT 0.3 11/14/2024     Albumin:   Albumin   Date Value Ref Range Status   11/14/2024 2.5 (L) 3.5 - 5.2 g/dL Final     Protein:   Total Protein   Date Value Ref Range Status   11/14/2024 4.6 (L) 6.0 - 8.4 g/dL Final     Lactic acid:   Lab Results   Component Value Date    LACTATE 0.7 11/01/2024    LACTATE 0.7 12/10/2023       Significant Imaging: I have reviewed all pertinent imaging results/findings within the past 24 hours.    60 min visit spent in chart review, face to face discussion of goals of care,  symptom assessment, coordination of care and emotional support.     Sudha Briceno NP  Palliative Medicine  [facility]

## 2024-11-14 NOTE — PROGRESS NOTES
Pulmonary/Critical Care Progress Note      PATIENT NAME: Alexa Otero  MRN: [id number]  TODAY'S DATE: 2024  11:55 AM  ADMIT DATE: 2024  AGE: 84 y.o. 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    She reports progressive fatigue weakness prior to admission to the hospital.  She reports that she slid out of her bed and that is the reason she came to the hospital.  She reports that she has not had any fever cough night sweats, but she has some worsening shortness of breath.    11/3   - rough night, had worsening shortness of breath and is required non-rebreather in addition no nasal cannula currently she is on high-flow at 10 liters/minute     the patient is feeling a little better today.  She is on 15 L high-flow nasal cannula.  Her CT shows her chronically infiltrated right lung with the surrounding effusion.  Her left lung which was normal looking 2 weeks ago now is diffusely infiltrated with ground-glass opacities.  This must be the Alimta.     the patient is improving with the steroids.  She looks much brighter today.  She is feeling better.  She is on 10 L high-flow nasal cannula.     the patient is very sleepy today.  The nurse reports she was up earlier and ate breakfast and sat up.  Her oxygen is set on 10 L.  Her sats remain 99% on 5 L. I have asked the nurse to try to wean this.     the patient is looking good today.  She is down to 3 L nasal cannula.  Her chest x-ray is improving but her left thorax is not yet normal. We need to get her priest out and her walking.     the patient is too  weak to participate with PT she says.  She also states that it makes her very short of breath.  Her saturations are difficult to .  Urology would like to leave the Rausch in.  Obviously, the patient is going to need to go to a nursing home from here.  She states she will be here over the weekend.    11/9 the patient states physical therapy has not yet come to see her today.  States she is feeling good from the waist up.  She is on 4 L nasal cannula.    11/10 the patient was not seen by physical therapy yesterday.  This is a shame.  She is concerned about still having a Rausch catheter.  Explained that Dr. Jon isn't coming to the hospital to see her, but she needs to get to him to be evaluated. The patient's oxygen requirements are higher again today.  Will increase her prednisone to 60 mg and see if we can not stabilize this.      11/11 .  The patient's breathing is not as good.  She is dyspneic with speech.  Will resume Solu-Medrol.  Have added Bactrim DS q.o.d. for PCP prevention.    11/12 The patient is now requiring nearly maximum vapotherm to oxygenate.  Her CXR is not appreciably changed.  Have pushed her back to 60 q 6 of solumedrol. Discussed with Dr. Cassidy the utility of bronchoscopy.  Given her compromised state she would need to be intubated and I don't know if I could get her extubated. Unfortunately, all we have to offer is steroids and will see if she turns around again.     11/13 the patient is on 30 L and 85% Vapotherm.  Looks very fatigued.  She had multiple questions after being visited by Sudha from palliative care.  Obviously, the patient can not go home.  We have no way to get enough oxygen to her.  I think that if she is not better by tomorrow we should choose hospice and then the 2 choices will be to try to get her to an LTAC with hospice or to do inpatient hospice here and withdraw care.  Discussed the patient, her , and her daughter.    11/14 the patient is still  requiring maximum Vapotherm to oxygenate.  She desaturates severely when she eats.  She has decided to go to hospice next Monday or Tuesday after her friends and family have visited.  We can certainly move the patient to a medical-surgical unit.    REVIEW OF SYSTEMS  General: Feeling weak  Eyes: Vision is good.  ENT:  No sinusitis or pharyngitis.   Heart: No chest pain or palpitations.  Lungs:  Feeling more short of breath.  GI: No Nausea, vomiting, constipation, diarrhea, or reflux.  : No dysuria, hesitancy, or nocturia.  Skin: No lesions or rashes.  Musculoskeletal:  Very weak..  Neuro: No headaches or neuropathy.  Lymph: No edema or adenopathy.  Psych: No anxiety or depression.  Endo: No weight change.        No change in the patient's Past Medical History, Past Surgical History, Social History or Family History since admission.    VITAL SIGNS (MOST RECENT)  Temp: 98 °F (36.7 °C) (11/14/24 0745)  Pulse: 75 (11/14/24 0745)  Resp: 17 (11/14/24 0745)  BP: 121/60 (11/14/24 0745)  SpO2: 96 % (11/14/24 0745)    INTAKE AND OUTPUT (LAST 24 HOURS):  Intake/Output Summary (Last 24 hours) at 11/14/2024 0941  Last data filed at 11/14/2024 0826  Gross per 24 hour   Intake 1120 ml   Output 1400 ml   Net -280 ml       WEIGHT  Wt Readings from Last 1 Encounters:   11/01/24 49.5 kg (109 lb 2 oz)         PHYSICAL EXAM  GENERAL: Older patient in no distress, looking frail.  HEENT: Pupils equal and reactive. Extraocular movements intact. Nose intact.  Pharynx moist.  NECK: Supple.   HEART: Regular rate and rhythm. No murmur or gallop auscultated.  LUNGS:  There are diminished breath sounds on the right.  On the left there are diffuse crackles anteriorly and posteriorly. Lung excursion symmetrical. No change in fremitus.  There is a port in thorax which is accessed.  ABDOMEN: Bowel sounds present. Non-tender, no masses palpated.  : Normal anatomy. Rausch with clear urine.  EXTREMITIES: Normal muscle tone and joint movement, no  cyanosis or clubbing.  She is very weak.  LYMPHATICS: No adenopathy palpated, no edema.  SKIN: Dry, intact, no lesions.  There is a stage I decubitus on the buttock.    NEURO: Cranial nerves II-XII intact. Motor strength 5/5 bilaterally, upper and lower extremities.  PSYCH: Appropriate affect.        CBC LAST (LAST 24 HOURS)  Recent Labs   Lab 11/14/24  0553   WBC 16.97*   RBC 2.32*   HGB 7.3*   HCT 23.3*   *   MCH 31.5*   MCHC 31.3*   RDW 18.0*      MPV 10.6   GRAN 90.2*  15.3*   LYMPH 1.0*  0.2*   MONO 7.2  1.2*   BASO 0.01   NRBC 0       CHEMISTRY LAST (LAST 24 HOURS)  Recent Labs   Lab 11/14/24  0553   *   K 5.0   CL 97   CO2 32*   ANIONGAP 6*   BUN 54*   CREATININE 1.4   *   CALCIUM 8.0*   MG 1.7   ALBUMIN 2.5*   PROT 4.6*   ALKPHOS 43*   ALT 18   AST 10   BILITOT 0.3         LAST 7 DAYS MICROBIOLOGY   Microbiology Results (last 7 days)       ** No results found for the last 168 hours. **            MOST RECENT IMAGING  X-Ray Chest AP Portable  Narrative: EXAMINATION:  XR CHEST AP PORTABLE    CLINICAL HISTORY:  pneumonitis;    FINDINGS:  Portable chest radiograph at 06:00 hours compared to numerous prior exams including 11/10/2024 shows no significant interval change.  Impression: No significant interval change.    Electronically signed by: Davide Jeffrey  Date:    11/11/2024  Time:    09:04      CURRENT VISIT EKG  Results for orders placed or performed during the hospital encounter of 11/01/24   EKG 12-lead   Result Value Ref Range    QRS Duration 56 ms    OHS QTC Calculation 482 ms    Narrative    Test Reason : R06.02,    Vent. Rate : 097 BPM     Atrial Rate : 097 BPM     P-R Int : 156 ms          QRS Dur : 056 ms      QT Int : 380 ms       P-R-T Axes : 044 -06 100 degrees     QTc Int : 482 ms    Normal sinus rhythm  Low voltage QRS  Septal infarct (cited on or before 13-OCT-2023)  Abnormal ECG  When compared with ECG of 25-OCT-2024 10:48,  ST now depressed in Lateral  leads    Referred By: [email or web address]   SELF           Confirmed By:        ECHOCARDIOGRAM RESULTS  Results for orders placed during the hospital encounter of 10/07/24    Echo    Interpretation Summary    Left Ventricle: The left ventricle is normal in size. Mildly increased wall thickness. There is mild concentric hypertrophy. Moderate global hypokinesis present. There is mildly reduced systolic function with a visually estimated ejection fraction of 40 - 45%. There is normal diastolic function.    Right Ventricle: Normal right ventricular cavity size. Wall thickness is normal. Systolic function is normal.    Left Atrium: Left atrium is moderately dilated.    Mitral Valve: There is bileaflet sclerosis. There is moderate mitral annular calcification present. There is moderate stenosis. The mean pressure gradient across the mitral valve is 8 mmHg at a heart rate of  bpm. There is mild regurgitation with a centrally directed jet.    Pulmonary Artery: The estimated pulmonary artery systolic pressure is 29 mmHg.    IVC/SVC: Normal venous pressure at 3 mmHg.        Oxygen INFORMATION  Oxygen Concentration (%):  [] 85   Vapotherms 35l/min and 90%           PLAN:.      Pneumonitis involving the left lung  - oxygenation is quite poor despite changing prednisone to solumedrol  - patient has no signs or symptoms of pneumonia  - this was not there 2 weeks ago making progression of her cancer less likely but possible especially given the apparent tumor in the airway and the way the tumor recurred after her lobectomy  - most likely the Alimta  - Continue duo nebs   - continue Solu-Medrol  - add bactrim DS qod  - Continue her oxygen to maintain sats of 90%  - The patient is DNR/DNI which is appropriate  Lepidic adenocarcinoma  - involving the remainder of the right lung  - there appears to be endobronchial disease now as well on the right  Pleural effusion  - persisting and circumferential  Emphysema  - continuing  bronchodilators  Acute on chronic hypoxemic respiratory failure  - on 40 liters and 100%  Thrombocytopenia  - improved  - heme Onc following  Moderate hypoalbuminemia      Discussed her severe Respiratory failure which is present because of the disease in the left lung.  Her right lung is basically nonfunctional.  This is either her cancer or her Alimta.  Bronching her would leave her stuck on a ventilator.  The patient has decided to do hospice. She will wait until Monday or Tuesday after she has had time to visit with family and friends.  Suggest moving to med surg floor.        Neva Christian MD  Date of Service: 11/14/2024  11:55 AM

## 2024-11-14 NOTE — NURSING
"Attempted to see pt.for Wound Care F/U.  Therapy x 2 at bedside trying to assist pt. to bedside chair.  Pt. refuses assessment and reports "doesn't feel well today". She does appear more gaunt, pale and more short of breath compared to my last assessment. Will will attempt to F/U with her.   "
"Pt o2 sats dropped to 77% on 15 L HFNC. Placed NRB with oxygen maxed out and sats up to 89-90%. Asked pt to try bipap once again and pt agreeable if she can be "knocked out." Dr Hayes notified. Valium ordered and administered and pt placed on bipap. Sats now 96%.    Bladder scan on pt and showing >1100 ccs. bladder feels hard and distended. Pt placed on BSC with little urine produced. Unable to measure as pt had bowel movement. Bladder scan showing 950 ccs still in bladder. Dr washington notified. Rausch ordered and placed. Pt put out 1500ccs of urine.  "
"Pt. Seen for Wound Consult.    No other issues noted other then Rodolfo. Buttocks.    Pt. has a very small non-blanchable area to the Rodolfo. Buttocks where excess skin touched on each side of the inner buttocks causing pressure. Pt. reports "I know something is back there".  Daughter and spouse at bedside and showed daughter the reason for area.  All were instructed on pressure prevention and report they understand instructions.     Cleansed Rodolfo. Buttocks with NS and gauze. Patted dry with gauze.  Applied thin layer of Triad and then applied an Adhesive Bordered Foam dressing and somewhat creased the center in order to separate the buttocks from each other. Blanca. Well. Floated heels on pillows.     "
BS-435. Administered scheduled insulin aspart 5 units, as well as the PRN sliding scale dose of insulin asparte 10 units. John DONOVAN notified via secure chat. Call light in reach, bed in lowest position, wheels locked, side rails up x2, and bed alarm set.   
Pt arrived to unit, 2522, via stretcher. Pt on 15 L non-re breather alert and AAOx's 4. Telemetry on. Pt oriented to room. Bed in the lowest position and locked. Bed alarm on. Side rails up X's 2. Call light within reach. Instructed pt to call for assistance. Pt voices understanding.   
Pt asking to take bipap off. Education provided. Pt will not wear.  
Pt. Seen for wound care F/U.      Cleansed Rodolfo. Buttocks with NS and gauze. Patted dry with gauze.  Applied thin layer of Triad and then applied an Adhesive Bordered Foam dressing and somewhat creased the center in order to separate the buttocks from each other. Blanca. Well. Floated heels on pillows.     Pink and blanchable.     
RT called to place bipap on pt. Per RT pt refused earlier in the shift. RT was able to get pt on bipap, but after 10 minutes pt states she cannot tolerate it. Pt placed back on 15 L HFNC.  
Plan: Recommended daily sunscreen of SPF 30 or higher, reapplied after 2 hours of cumulative sun exposure.  Sunscreens should be applied at least 15 minutes prior to sun exposure.  Sun protective clothing was discussed.
Detail Level: Zone

## 2024-11-14 NOTE — PT/OT/SLP PROGRESS
Physical Therapy      Patient Name:  Alexa Otero   MRN:  [id number]    Patient not seen today secondary to Nursing care. Will follow-up 11/15/24.

## 2024-11-14 NOTE — CARE UPDATE
11/14/24 0705   Patient Assessment/Suction   Level of Consciousness (AVPU) responds to voice  (pt asleep)   Respiratory Effort Normal;Unlabored   Expansion/Accessory Muscles/Retractions no use of accessory muscles;no retractions;expansion symmetric   Rhythm/Pattern, Respiratory unlabored;pattern regular;depth regular   Cough Frequency infrequent   PRE-TX-O2   Device (Oxygen Therapy) high flow nasal cannula w/device   $ Is the patient on High Flow Oxygen? Yes   SpO2 100 %   Pulse Oximetry Type Continuous   $ Pulse Oximetry - Multiple Charge Pulse Oximetry - Multiple   Pulse 74   Resp 16   Aerosol Therapy   $ Aerosol Therapy Charges Aerosol Treatment   Daily Review of Necessity (SVN) completed   Respiratory Treatment Status (SVN) given   Treatment Route (SVN) oxygen;mask   Patient Position HOB elevated   Post Treatment Assessment (SVN) breath sounds unchanged   Signs of Intolerance (SVN) none   Incentive Spirometer   $ Incentive Spirometer Charges other (see comments)  (pt asleep)

## 2024-11-14 NOTE — ASSESSMENT & PLAN NOTE
I reviewed the patient's chart and discussed the case with the patient's team.      I examined Alexa Otero at bedside.  The patient maintains capacity for complex medical decision-making.  I spoke with pt and family members at .    I introduced myself and my role as palliative care NP. They were agreeable to speaking.    She recalls my visit from yesterday. She is in good spirits today. Her family and friends have been coming to visit with her. She is getting her affairs in order.     Her symptoms are currently managed.     Her goals remain clear that she wants to transition to comfort focused care after this weekend. She has several more family members and friends coming to visit her. She would also like some more time to finish getting her affairs in order.     I spent some time and provided emotional support.     Will cont with supportive care.     We appreciate being involved in pts care. We will cont to follow along. Please reach out if we can be of any assistance.

## 2024-11-14 NOTE — SUBJECTIVE & OBJECTIVE
Interval History: Pt doing well today. She conts on vapotherm. Family present at BS.    Medications:  Continuous Infusions:  Scheduled Meds:   albuterol-ipratropium  3 mL Nebulization Q6H WAKE    amiodarone  200 mg Oral BID    apixaban  2.5 mg Oral BID    electrolytes-dextrose  200 mL Oral Q4H    folic acid  1 mg Oral Daily    gabapentin  100 mg Oral BID    insulin aspart U-100  5 Units Subcutaneous TIDWM    magnesium oxide  400 mg Oral Daily    methylPREDNISolone injection (PEDS and ADULTS)  60 mg Intravenous Q6H    metoprolol succinate  25 mg Oral Daily    pantoprazole  40 mg Oral Daily    polyethylene glycol  17 g Oral Daily    sucralfate  1 g Oral QID    sulfamethoxazole-trimethoprim 800-160mg  1 tablet Oral Every other day    tamsulosin  0.4 mg Oral Daily     PRN Meds:  Current Facility-Administered Medications:     0.9%  NaCl infusion (for blood administration), , Intravenous, Q24H PRN    acetaminophen, 650 mg, Oral, Q8H PRN    acetaminophen, 650 mg, Oral, Q4H PRN    aluminum-magnesium hydroxide-simethicone, 30 mL, Oral, QID PRN    dextrose 50%, 12.5 g, Intravenous, PRN    dextrose 50%, 25 g, Intravenous, PRN    glucagon (human recombinant), 1 mg, Intramuscular, PRN    glucose, 16 g, Oral, PRN    glucose, 24 g, Oral, PRN    HYDROcodone-acetaminophen, 1 tablet, Oral, Q6H PRN    insulin aspart U-100, 0-10 Units, Subcutaneous, QID (AC + HS) PRN    lactulose, 20 g, Oral, Q6H PRN    LORazepam, 0.5 mg, Oral, Q8H PRN    magnesium oxide, 800 mg, Oral, PRN    magnesium oxide, 800 mg, Oral, PRN    melatonin, 6 mg, Oral, Nightly PRN    naloxone, 0.02 mg, Intravenous, PRN    ondansetron, 4 mg, Intravenous, Q6H PRN    potassium bicarbonate, 35 mEq, Oral, PRN    potassium bicarbonate, 50 mEq, Oral, PRN    potassium bicarbonate, 60 mEq, Oral, PRN    potassium, sodium phosphates, 2 packet, Oral, PRN    potassium, sodium phosphates, 2 packet, Oral, PRN    potassium, sodium phosphates, 2 packet, Oral, PRN    sodium chloride  Nenita Collins is a 15 year old female presenting for   Chief Complaint   Patient presents with   • Toe Pain     possible ingrown toenail on great toe of right foot, with some swelling      Denies Latex allergy or sensitivity.    Medication verified, no changes  Refills needed today: No  Tetanus Vaccine reviewed and is current  There are no preventive care reminders to display for this patient.  Patient is up to date, no discussion needed.                       0.9%, 2 mL, Intravenous, Q12H PRN    Objective:     Vital Signs (Most Recent):  Temp: 97.5 °F (36.4 °C) (11/14/24 1130)  Pulse: 69 (11/14/24 1130)  Resp: 17 (11/14/24 0745)  BP: 123/65 (11/14/24 1130)  SpO2: 100 % (11/14/24 1200) Vital Signs (24h Range):  Temp:  [97.5 °F (36.4 °C)-98 °F (36.7 °C)] 97.5 °F (36.4 °C)  Pulse:  [67-78] 69  Resp:  [16-20] 17  SpO2:  [94 %-100 %] 100 %  BP: (121-128)/(57-69) 123/65     Weight: 49.5 kg (109 lb 2 oz)  Body mass index is 18.73 kg/m².       Physical Exam  Vitals and nursing note reviewed.   Constitutional:       General: She is not in acute distress.     Appearance: She is ill-appearing.   HENT:      Mouth/Throat:      Mouth: Mucous membranes are moist.   Eyes:      General: No scleral icterus.     Pupils: Pupils are equal, round, and reactive to light.   Cardiovascular:      Rate and Rhythm: Normal rate and regular rhythm.      Pulses: Normal pulses.   Pulmonary:      Effort: No respiratory distress.      Comments: Mild tachypnea when speaking, able to complete full sentences  Skin:     General: Skin is warm and dry.   Neurological:      General: No focal deficit present.      Mental Status: She is alert and oriented to person, place, and time.   Psychiatric:         Mood and Affect: Mood normal.         Behavior: Behavior normal.         Thought Content: Thought content normal.         Judgment: Judgment normal.            Review of Symptoms      Symptom Assessment (ESAS 0-10 Scale)  Pain:  0  Dyspnea:  5  Anxiety:  0  Nausea:  0  Depression:  0  Anorexia:  0  Fatigue:  5  Insomnia:  0  Restlessness:  0  Agitation:  0         Performance Status:  30    Living Arrangements:  Lives with spouse    Psychosocial/Cultural:   See Palliative Psychosocial Note: No  **Primary  to Follow**  Palliative Care  Consult: No        Advance Care Planning   Advance Directives:   Living Will: Yes        Copy on chart: Yes    Do Not Resuscitate Status: Yes    Medical  Power of : Yes      Decision Making:  Patient answered questions  Goals of Care: What is most important right now is to focus on comfort and QOL . Accordingly, we have decided that the best plan to meet the patient's goals includes enrolling in hospice care.         Significant Labs: All pertinent labs within the past 24 hours have been reviewed.  CBC:   Recent Labs   Lab 11/14/24  0553   WBC 16.97*   HGB 7.3*   HCT 23.3*   *        BMP:  Recent Labs   Lab 11/14/24  0553   *   *   K 5.0   CL 97   CO2 32*   BUN 54*   CREATININE 1.4   CALCIUM 8.0*   MG 1.7     LFT:  Lab Results   Component Value Date    AST 10 11/14/2024    ALKPHOS 43 (L) 11/14/2024    BILITOT 0.3 11/14/2024     Albumin:   Albumin   Date Value Ref Range Status   11/14/2024 2.5 (L) 3.5 - 5.2 g/dL Final     Protein:   Total Protein   Date Value Ref Range Status   11/14/2024 4.6 (L) 6.0 - 8.4 g/dL Final     Lactic acid:   Lab Results   Component Value Date    LACTATE 0.7 11/01/2024    LACTATE 0.7 12/10/2023       Significant Imaging: I have reviewed all pertinent imaging results/findings within the past 24 hours.

## 2024-11-14 NOTE — CARE UPDATE
11/13/24 1908   Patient Assessment/Suction   Level of Consciousness (AVPU) alert   Respiratory Effort Unlabored   Expansion/Accessory Muscles/Retractions no retractions;no use of accessory muscles   All Lung Fields Breath Sounds Anterior:;Lateral:;coarse   Rhythm/Pattern, Respiratory no shortness of breath reported   Cough Frequency infrequent   Skin Integrity   $ Wound Care Tech Time 15 min   Area Observed Left;Right;Behind ear;Nares   Skin Appearance without discoloration   PRE-TX-O2   Device (Oxygen Therapy) high flow nasal cannula w/device   Flow (L/min) (Oxygen Therapy) 30   Oxygen Concentration (%) 85   SpO2 (!) 94 %   Pulse Oximetry Type Intermittent   $ Pulse Oximetry - Multiple Charge Pulse Oximetry - Multiple   Pulse 74   Resp 20   Aerosol Therapy   $ Aerosol Therapy Charges Aerosol Treatment   Daily Review of Necessity (SVN) completed   Respiratory Treatment Status (SVN) given   Treatment Route (SVN) in-line   Patient Position head tilt down   Post Treatment Assessment (SVN) breath sounds unchanged   Signs of Intolerance (SVN) none   Breath Sounds Post-Respiratory Treatment   Throughout All Fields Post-Treatment All Fields   Throughout All Fields Post-Treatment no change   Post-treatment Heart Rate (beats/min) 76   Post-treatment Resp Rate (breaths/min) 20   Incentive Spirometer   $ Incentive Spirometer Charges done with encouragement   Incentive Spirometer Predicted Level (mL) 1700   Administration (IS) self-administered   Number of Repetitions (IS) 5   Level Incentive Spirometer (mL) 300   Patient Tolerance (IS) fair   Ready to Wean/Extubation Screen   FIO2<=50 (chart decimal) (!) 0.85   Education   $ Education Bronchodilator;Oxygen;Incentive Spirometry;15 min

## 2024-11-14 NOTE — PT/OT/SLP PROGRESS
Occupational Therapy      Patient Name:  Alexa Otero   MRN:  [id number]    Patient not seen today secondary to  (Occupational Therapist Unavailable.). Will follow-up tomorrow.    11/14/2024

## 2024-11-14 NOTE — PROGRESS NOTES
[facility] Medicine  Progress Note    Patient Name: Alexa Otero  MRN: [id number]  Patient Class: IP- Inpatient   Admission Date: 11/1/2024  Length of Stay: 13 days  Attending Physician: Judy Birmingham MD  Primary Care Provider: Idalia Greco MD        Subjective:     Principal Problem:Acute on chronic hypoxic respiratory failure        HPI:  84-year-old female with a past medical history of lung cancer actively getting chemo last of which was 5 days ago, who presented to the ER because of generalized weakness.  According to the patient, she woke up today, tried to get out of bed, but felt very weak, and slid beside the bed.  Did not hit her head, and did not lose consciousness.  She however could not get up however, and her  could not assist.  911 was called.  On arrival of EMS, patient was satting 88% on 4 L, and appeared to be short of breath.  She was brought to the ER for evaluation.    In the ER, vitals showed a blood pressure of 145/65, heart rate of 103, respiratory rate of 20, afebrile satting 60% on 4 L.  Immediately patient was placed on non-rebreather.  CBC with white count of 16.8, and macrocytic anemia.  CMP showed hypokalemia of 3.4.  First troponin is elevated at 46.  Point of care Lactic acid was 2.1.  Blood cultures were collected.  EKG showed sinus rhythm.  Chest x-ray showed no significant change of bilateral diffuse mixed interstitial and airspace lung opacities.  CTA chest was done, and it was negative for PE, but showed extensive interstitial and airspace opacities throughout both lungs, having significantly worsened in the left lung compared to prior CT. Unchanged small to moderate sized bilateral pleural effusions.  Cefepime was ordered, and patient will be admitted to Medicine for further care of her severe sepsis.    Overview/Hospital Course:  Ms. Otero has been monitored closely during her hospitalization. She was admitted on 11/1/2024 with acute  on chronic hypoxemic resp failure. CTA chest reveals extensive interstitial opacities c/w multifocal pna and/or ARDS. She requires high flow nasal cannula up to 15L now on 13L. Pulm and ID have been consulted. Pulm recommends broad spectrum abx - cefepime, doxy, and vanc initially. MRSA screen negative and vanc d/c'ed. She has stage IV lung ca and completed Carbo/Pemetrexed last year and is currently on pemetrexed (Alimta) maintenance with last dose 10/28. This can cause an interstitial pneumonitis and pulm has started prednisone. Plan for bronchoscopy on Monday if no improvement and recommended continuing eliquis. BiPAP qHS and naps. Duonebs Q6h. ID has ordered a respiratory infection panel that is negative. She's had abdominal bloating and discomfort for some time and had an outpt CT abd/pelvis with gastric wall thickening that is is nonspecific could be related to gastritis. She has a history of peptic ulcers and PCP started protonix/carafate. KUB on 11/2 with nonobstructive bowel gas pattern and moderate stool burden and pt was treated with laxative suppository. She has chronic macrocytic anemia that appears at baseline. She had a leukocytosis with left shift on admission that has trended down from 16K-->12K-->8K. CRP 36, procal 2.9. Pt had a BM on 11/2, but abd remained distended. She was found to be retaining a significant amount of urine on bladder scan >900 and priest was placed with 1500mL out with resolution of abd distention. Pulm increased steriods on 11/3 to IV solumedrol, she's requiring BiPAP support and was given IV lasix. Macrocytic anemia at baseline on admission but trending down and she will receive 1 unit PRBCs 11/4. She has developed steroid induced hyperglycemia and insulin sliding titrated up to moderate dose for tighter glucose control. Priest d/c'ed on 11/5 and had to be reinserted on 11/06. PT/OT consulted and pt up to chair on 11/5. She desatted with movement and took some time and  increased supplemental O2 to recover. Prior to moving to the chair, O2 was weaned down to 10L high flow. 11/6/2024 pulmonology change patient to p.o. prednisone as her oxygen demand had improved.  Patient was being maintained on approximately 5 L of oxygen.  11/11/24 patient was working with physical therapy when she became hypoxic requiring Vapotherm.  At this time she is currently requiring max dose of Vapotherm.  Bronchoscopy is not suggested by pulmonology due to patient's fragile condition.  Patient was switched back to IV Solu-Medrol q.6 I was per pulmonology.  Long discussion was had with the patient concerning inpatient hospice versus comfort measures, patient and spouse would like comfort measures but not until the patient saw her family.    Interval History:  Patient seen and examined at bedside during morning rounds.   was present.  Reports no new events overnight.  Patient is awaiting family from out of town.    Review of Systems   Constitutional:         Positive for generalized weakness.   Respiratory:  Positive for shortness of breath.    All other systems reviewed and are negative.    Objective:     Vital Signs (Most Recent):  Temp: 98 °F (36.7 °C) (11/14/24 0745)  Pulse: 75 (11/14/24 0745)  Resp: 17 (11/14/24 0745)  BP: 121/60 (11/14/24 0745)  SpO2: 96 % (11/14/24 0745) Vital Signs (24h Range):  Temp:  [97.6 °F (36.4 °C)-98 °F (36.7 °C)] 98 °F (36.7 °C)  Pulse:  [71-78] 75  Resp:  [16-22] 17  SpO2:  [93 %-100 %] 96 %  BP: (108-128)/(52-69) 121/60     Weight: 49.5 kg (109 lb 2 oz)  Body mass index is 18.73 kg/m².    Intake/Output Summary (Last 24 hours) at 11/14/2024 1043  Last data filed at 11/14/2024 0826  Gross per 24 hour   Intake 1270 ml   Output 1400 ml   Net -130 ml         Physical Exam  Constitutional:       Appearance: Normal appearance.   HENT:      Head: Normocephalic.      Right Ear: Tympanic membrane normal.      Left Ear: Tympanic membrane normal.      Nose: Nose normal. No  congestion or rhinorrhea.      Mouth/Throat:      Mouth: Mucous membranes are dry.      Pharynx: Oropharynx is clear. No oropharyngeal exudate or posterior oropharyngeal erythema.      Comments: Mucous membranes are tacky  Eyes:      Pupils: Pupils are equal, round, and reactive to light.   Cardiovascular:      Rate and Rhythm: Normal rate and regular rhythm.   Pulmonary:      Effort: Pulmonary effort is normal.      Breath sounds: Rhonchi present. No wheezing or rales.      Comments: Decreased aeration  Abdominal:      General: Abdomen is flat.      Palpations: Abdomen is soft.   Musculoskeletal:         General: Normal range of motion.      Cervical back: Normal range of motion.   Skin:     General: Skin is warm and dry.   Neurological:      General: No focal deficit present.      Mental Status: She is alert.   Psychiatric:         Mood and Affect: Mood normal.             Significant Labs: All pertinent labs within the past 24 hours have been reviewed.    Significant Imaging: I have reviewed all pertinent imaging results/findings within the past 24 hours.    Assessment/Plan:      * Acute on chronic hypoxic respiratory failure  Likely secondary to her bilateral extensive pneumonia vs pneumonitis  On 4L NC at home  Currently on high flow nasal cannula and intermittent bipap  Pulm consulted   CTA chest ruled out PE.  Echo done last month showed normal systolic function.   Currently on high flow 35%  P.o. steroids changed to Solu-Medrol  Bactrim to prevent PCP  Respiratory status is not improving.  Palliative care consulted.      Goals of care, counseling/discussion  Advance Care Planning    See palliative care note    Urinary retention  Constipation likely contributing  She had a couple of Bms and started bowel regimen  Priest placed for significant retention   D/C priest 11/5 and had to be reinserted on 11/6  Flomax initiated  Urology consulted:  Recommended follow up outpatient.  Keep Priest in place for 5-7 days  before voiding trial.  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 Most recent hemoglobin and hematocrit are listed below.  Recent Labs     11/11/24  0509 11/12/24  0511 11/13/24  0459   HGB 8.5* 7.9* 7.5*   HCT 27.0* 24.9* 23.7*       Plan  - Monitor serial CBC: Daily  - Transfuse PRBC if patient becomes hemodynamically unstable, symptomatic or H/H drops below 7/21.  - Patient has received 1 units of PRBCs on 11/4  - Patient's anemia is currently decreasing  - T&S in AM in the event she requires a transfusion    Pneumonitis  Patient was started on cefepime, doxy for atypicals, and Vanc, vanc d/c'ed with negative MRSA screen  Pulmonary and Infectious Disease following  Steroids increased by pulmonology   Antibiotic therapy complete    Antibiotics (From admission, onward)      Start     Stop Route Frequency Ordered    11/12/24 0900  sulfamethoxazole-trimethoprim 800-160mg per tablet 1 tablet         -- Oral Every other day 11/11/24 0839    11/01/24 1300  mupirocin 2 % ointment         11/06/24 0859 Nasl 2 times daily 11/01/24 1200            Microbiology Results (last 7 days)       ** No results found for the last 168 hours. **            Pleural effusion  Bilateral, stable.   Pulm consulted  In the setting of lung ca      Malignant neoplasm of lower lobe of right lung  Carbo/Pemetrexed until 4/2024 now on Pemetrexed maintenance with last dose 10/28/24  Consult pt's oncologist, Dr. Cassidy following      Atrial fibrillation  Patient has paroxysmal (<7 days) atrial fibrillation. Patient is currently in sinus rhythm. [id number] Score: 5. The patients heart rate in the last 24 hours is as follows:  Pulse  Min: 68  Max: 81     Antiarrhythmics  Amiodarone and metoprolol    Anticoagulants  Eliquis    Plan  - Replete lytes with a goal of K>4, Mg >2  - Patient is anticoagulated, see medications listed above.  - Patient's afib is currently controlled    ACP (advance care  planning)  See palliative care consult  Patient and spouse would like comfort measures but not until their family arrives.      VTE Risk Mitigation (From admission, onward)           Ordered     apixaban tablet 2.5 mg  2 times daily         11/01/24 1316     IP VTE HIGH RISK PATIENT  Once         11/01/24 1158     Place sequential compression device  Until discontinued         11/01/24 1158                    Discharge Planning   ALYCIA:      Code Status: DNR   Is the patient medically ready for discharge?:     Reason for patient still in hospital (select all that apply): Pending disposition and Other (specify) awaiting family  Discharge Plan A: Skilled Nursing Facility   Discharge Delays: None known at this time              John Islas NP  Department of Hospital Medicine   [facility]

## 2024-11-14 NOTE — SUBJECTIVE & OBJECTIVE
Interval History:  Patient seen and examined at bedside during morning rounds.   was present.  Reports no new events overnight.  Patient is awaiting family from out of town.    Review of Systems   Constitutional:         Positive for generalized weakness.   Respiratory:  Positive for shortness of breath.    All other systems reviewed and are negative.    Objective:     Vital Signs (Most Recent):  Temp: 98 °F (36.7 °C) (11/14/24 0745)  Pulse: 75 (11/14/24 0745)  Resp: 17 (11/14/24 0745)  BP: 121/60 (11/14/24 0745)  SpO2: 96 % (11/14/24 0745) Vital Signs (24h Range):  Temp:  [97.6 °F (36.4 °C)-98 °F (36.7 °C)] 98 °F (36.7 °C)  Pulse:  [71-78] 75  Resp:  [16-22] 17  SpO2:  [93 %-100 %] 96 %  BP: (108-128)/(52-69) 121/60     Weight: 49.5 kg (109 lb 2 oz)  Body mass index is 18.73 kg/m².    Intake/Output Summary (Last 24 hours) at 11/14/2024 1043  Last data filed at 11/14/2024 0826  Gross per 24 hour   Intake 1270 ml   Output 1400 ml   Net -130 ml         Physical Exam  Constitutional:       Appearance: Normal appearance.   HENT:      Head: Normocephalic.      Right Ear: Tympanic membrane normal.      Left Ear: Tympanic membrane normal.      Nose: Nose normal. No congestion or rhinorrhea.      Mouth/Throat:      Mouth: Mucous membranes are dry.      Pharynx: Oropharynx is clear. No oropharyngeal exudate or posterior oropharyngeal erythema.      Comments: Mucous membranes are tacky  Eyes:      Pupils: Pupils are equal, round, and reactive to light.   Cardiovascular:      Rate and Rhythm: Normal rate and regular rhythm.   Pulmonary:      Effort: Pulmonary effort is normal.      Breath sounds: Rhonchi present. No wheezing or rales.      Comments: Decreased aeration  Abdominal:      General: Abdomen is flat.      Palpations: Abdomen is soft.   Musculoskeletal:         General: Normal range of motion.      Cervical back: Normal range of motion.   Skin:     General: Skin is warm and dry.   Neurological:      General: No  focal deficit present.      Mental Status: She is alert.   Psychiatric:         Mood and Affect: Mood normal.             Significant Labs: All pertinent labs within the past 24 hours have been reviewed.    Significant Imaging: I have reviewed all pertinent imaging results/findings within the past 24 hours.

## 2024-11-14 NOTE — PLAN OF CARE
[facility]  Discharge Reassessment    Primary Care Provider: Idalia Greco MD    Expected Discharge Date: Unknown    Per palliative care, pt does desire comfort measures but wants to wait until this weekend for her family to get here and to get her affairs in order. Pt currently on 35L vapotherm and unable to DC home. CM following for hospice/withdrawal of care.     Reassessment (most recent)       Discharge Reassessment - 11/14/24 8351          Discharge Reassessment    Assessment Type Discharge Planning Reassessment     Did the patient's condition or plan change since previous assessment? Yes     Discharge Plan discussed with: Patient;Adult children     Communicated ALYCIA with patient/caregiver Date not available/Unable to determine     Discharge Plan A Comfort care/withdrawal     Discharge Plan B Hospice/home     DME Needed Upon Discharge  none     Transition of Care Barriers None     Why the patient remains in the hospital Requires continued medical care;Other (see comment)   preparing for comfort care

## 2024-11-15 NOTE — ASSESSMENT & PLAN NOTE
Pneumonitis:  Patient again on high flow O2.  Discussed with Dr. Christian cancer vs progressive lung disease.  No further cancer treatment options exist.  Likely home with hospice if no improvement.     Lung cancer:  No further treatment indicated.

## 2024-11-15 NOTE — SUBJECTIVE & OBJECTIVE
Interval History:     Oncology Treatment Plan:   OP NSCLC PEMETREXED + CARBOPLATIN (AUC) Q3W    Medications:  Continuous Infusions:  Scheduled Meds:   albuterol-ipratropium  3 mL Nebulization Q6H WAKE    amiodarone  200 mg Oral BID    apixaban  2.5 mg Oral BID    electrolytes-dextrose  200 mL Oral Q4H    folic acid  1 mg Oral Daily    gabapentin  100 mg Oral BID    insulin aspart U-100  5 Units Subcutaneous TIDWM    magnesium oxide  400 mg Oral Daily    methylPREDNISolone injection (PEDS and ADULTS)  60 mg Intravenous Q6H    metoprolol succinate  25 mg Oral Daily    pantoprazole  40 mg Oral Daily    polyethylene glycol  17 g Oral Daily    sucralfate  1 g Oral QID    sulfamethoxazole-trimethoprim 800-160mg  1 tablet Oral Every other day    tamsulosin  0.4 mg Oral Daily     PRN Meds:  Current Facility-Administered Medications:     0.9%  NaCl infusion (for blood administration), , Intravenous, Q24H PRN    acetaminophen, 650 mg, Oral, Q8H PRN    acetaminophen, 650 mg, Oral, Q4H PRN    aluminum-magnesium hydroxide-simethicone, 30 mL, Oral, QID PRN    dextrose 50%, 12.5 g, Intravenous, PRN    dextrose 50%, 25 g, Intravenous, PRN    glucagon (human recombinant), 1 mg, Intramuscular, PRN    glucose, 16 g, Oral, PRN    glucose, 24 g, Oral, PRN    HYDROcodone-acetaminophen, 1 tablet, Oral, Q6H PRN    insulin aspart U-100, 0-10 Units, Subcutaneous, QID (AC + HS) PRN    lactulose, 20 g, Oral, Q6H PRN    LORazepam, 0.5 mg, Oral, Q8H PRN    magnesium oxide, 800 mg, Oral, PRN    magnesium oxide, 800 mg, Oral, PRN    melatonin, 6 mg, Oral, Nightly PRN    naloxone, 0.02 mg, Intravenous, PRN    ondansetron, 4 mg, Intravenous, Q6H PRN    potassium bicarbonate, 35 mEq, Oral, PRN    potassium bicarbonate, 50 mEq, Oral, PRN    potassium bicarbonate, 60 mEq, Oral, PRN    potassium, sodium phosphates, 2 packet, Oral, PRN    potassium, sodium phosphates, 2 packet, Oral, PRN    potassium, sodium phosphates, 2 packet, Oral, PRN    sodium  chloride 0.9%, 2 mL, Intravenous, Q12H PRN       Objective:     Vital Signs (Most Recent):  Temp: 97.7 °F (36.5 °C) (11/15/24 1152)  Pulse: 72 (11/15/24 1352)  Resp: (!) 24 (11/15/24 1352)  BP: 139/64 (11/15/24 1152)  SpO2: 100 % (11/15/24 1352) Vital Signs (24h Range):  Temp:  [97.3 °F (36.3 °C)-98 °F (36.7 °C)] 97.7 °F (36.5 °C)  Pulse:  [65-75] 72  Resp:  [18-24] 24  SpO2:  [93 %-100 %] 100 %  BP: (121-147)/(53-66) 139/64     Weight: 49.5 kg (109 lb 2 oz)  Body mass index is 18.73 kg/m².  Body surface area is 1.5 meters squared.      Intake/Output Summary (Last 24 hours) at 11/15/2024 1453  Last data filed at 11/15/2024 1033  Gross per 24 hour   Intake 240 ml   Output 1750 ml   Net -1510 ml        Physical Exam  Constitutional:       Appearance: Normal appearance.   HENT:      Head: Normocephalic and atraumatic.   Eyes:      General: No scleral icterus.     Conjunctiva/sclera: Conjunctivae normal.   Cardiovascular:      Rate and Rhythm: Normal rate.   Pulmonary:      Effort: Pulmonary effort is normal.   Abdominal:      General: Abdomen is flat.   Neurological:      General: No focal deficit present.      Mental Status: She is alert and oriented to person, place, and time.   Psychiatric:         Mood and Affect: Mood normal.         Behavior: Behavior normal.          Significant Labs:   CBC:   Recent Labs   Lab 11/14/24  0553 11/15/24  0448   WBC 16.97* 15.42*   HGB 7.3* 7.6*   HCT 23.3* 23.9*    195    and CMP:   Recent Labs   Lab 11/14/24  0553 11/15/24  0448   * 137   K 5.0 5.2*   CL 97 99   CO2 32* 36*   * 246*   BUN 54* 50*   CREATININE 1.4 1.4   CALCIUM 8.0* 7.9*   PROT 4.6* 4.5*   ALBUMIN 2.5* 2.3*   BILITOT 0.3 0.3   ALKPHOS 43* 43*   AST 10 12   ALT 18 30   ANIONGAP 6* 2*       Diagnostic Results:  None  Review of Systems

## 2024-11-15 NOTE — PROGRESS NOTES
Pending sale to [facility]  Hematology/Oncology  Progress Note    Patient Name: Alexa Otero  Admission Date: 11/1/2024  Hospital Length of Stay: 14 days  Code Status: DNR     Subjective:     HPI:  Ms. Otero back on high flow o2. Feels comfortable today.  No new complaints.     Interval History:     Oncology Treatment Plan:   OP NSCLC PEMETREXED + CARBOPLATIN (AUC) Q3W    Medications:  Continuous Infusions:  Scheduled Meds:   albuterol-ipratropium  3 mL Nebulization Q6H WAKE    amiodarone  200 mg Oral BID    apixaban  2.5 mg Oral BID    electrolytes-dextrose  200 mL Oral Q4H    folic acid  1 mg Oral Daily    gabapentin  100 mg Oral BID    insulin aspart U-100  5 Units Subcutaneous TIDWM    magnesium oxide  400 mg Oral Daily    methylPREDNISolone injection (PEDS and ADULTS)  60 mg Intravenous Q6H    metoprolol succinate  25 mg Oral Daily    pantoprazole  40 mg Oral Daily    polyethylene glycol  17 g Oral Daily    sucralfate  1 g Oral QID    sulfamethoxazole-trimethoprim 800-160mg  1 tablet Oral Every other day    tamsulosin  0.4 mg Oral Daily     PRN Meds:  Current Facility-Administered Medications:     0.9%  NaCl infusion (for blood administration), , Intravenous, Q24H PRN    acetaminophen, 650 mg, Oral, Q8H PRN    acetaminophen, 650 mg, Oral, Q4H PRN    aluminum-magnesium hydroxide-simethicone, 30 mL, Oral, QID PRN    dextrose 50%, 12.5 g, Intravenous, PRN    dextrose 50%, 25 g, Intravenous, PRN    glucagon (human recombinant), 1 mg, Intramuscular, PRN    glucose, 16 g, Oral, PRN    glucose, 24 g, Oral, PRN    HYDROcodone-acetaminophen, 1 tablet, Oral, Q6H PRN    insulin aspart U-100, 0-10 Units, Subcutaneous, QID (AC + HS) PRN    lactulose, 20 g, Oral, Q6H PRN    LORazepam, 0.5 mg, Oral, Q8H PRN    magnesium oxide, 800 mg, Oral, PRN    magnesium oxide, 800 mg, Oral, PRN    melatonin, 6 mg, Oral, Nightly PRN    naloxone, 0.02 mg, Intravenous, PRN    ondansetron, 4 mg, Intravenous, Q6H PRN    potassium  bicarbonate, 35 mEq, Oral, PRN    potassium bicarbonate, 50 mEq, Oral, PRN    potassium bicarbonate, 60 mEq, Oral, PRN    potassium, sodium phosphates, 2 packet, Oral, PRN    potassium, sodium phosphates, 2 packet, Oral, PRN    potassium, sodium phosphates, 2 packet, Oral, PRN    sodium chloride 0.9%, 2 mL, Intravenous, Q12H PRN       Objective:     Vital Signs (Most Recent):  Temp: 97.7 °F (36.5 °C) (11/15/24 1152)  Pulse: 72 (11/15/24 1352)  Resp: (!) 24 (11/15/24 1352)  BP: 139/64 (11/15/24 1152)  SpO2: 100 % (11/15/24 1352) Vital Signs (24h Range):  Temp:  [97.3 °F (36.3 °C)-98 °F (36.7 °C)] 97.7 °F (36.5 °C)  Pulse:  [65-75] 72  Resp:  [18-24] 24  SpO2:  [93 %-100 %] 100 %  BP: (121-147)/(53-66) 139/64     Weight: 49.5 kg (109 lb 2 oz)  Body mass index is 18.73 kg/m².  Body surface area is 1.5 meters squared.      Intake/Output Summary (Last 24 hours) at 11/15/2024 1453  Last data filed at 11/15/2024 1033  Gross per 24 hour   Intake 240 ml   Output 1750 ml   Net -1510 ml        Physical Exam  Constitutional:       Appearance: Normal appearance.   HENT:      Head: Normocephalic and atraumatic.   Eyes:      General: No scleral icterus.     Conjunctiva/sclera: Conjunctivae normal.   Cardiovascular:      Rate and Rhythm: Normal rate.   Pulmonary:      Effort: Pulmonary effort is normal.   Abdominal:      General: Abdomen is flat.   Neurological:      General: No focal deficit present.      Mental Status: She is alert and oriented to person, place, and time.   Psychiatric:         Mood and Affect: Mood normal.         Behavior: Behavior normal.          Significant Labs:   CBC:   Recent Labs   Lab 11/14/24  0553 11/15/24  0448   WBC 16.97* 15.42*   HGB 7.3* 7.6*   HCT 23.3* 23.9*    195    and CMP:   Recent Labs   Lab 11/14/24  0553 11/15/24  0448   * 137   K 5.0 5.2*   CL 97 99   CO2 32* 36*   * 246*   BUN 54* 50*   CREATININE 1.4 1.4   CALCIUM 8.0* 7.9*   PROT 4.6* 4.5*   ALBUMIN 2.5* 2.3*    BILITOT 0.3 0.3   ALKPHOS 43* 43*   AST 10 12   ALT 18 30   ANIONGAP 6* 2*       Diagnostic Results:  None  Review of Systems  Assessment/Plan:     Pneumonitis  Pneumonitis:  Patient again on high flow O2.  Discussed with Dr. Christian cancer vs progressive lung disease.  No further cancer treatment options exist.  Likely home with hospice if no improvement.     Lung cancer:  No further treatment indicated.                   Thank you for your consult. I will follow-up with patient. Please contact us if you have any additional questions.     Virgil Lloyd MD  Hematology/Oncology  [facility]

## 2024-11-15 NOTE — PROGRESS NOTES
[facility]  Palliative Medicine  Progress Note    Patient Name: Alexa Otero  MRN: [id number]  Admission Date: 11/1/2024  Hospital Length of Stay: 14 days  Code Status: DNR   Attending Provider: Judy Birmingham MD  Consulting Provider: Sudha Briceno NP  Primary Care Physician: Idalia Greco MD  Principal Problem:Acute on chronic hypoxic respiratory failure    Patient information was obtained from patient, spouse/SO, past medical records, ER records, and primary team.      Assessment/Plan:     Palliative Care  Goals of care, counseling/discussion  I reviewed the patient's chart and discussed the case with the patient's team.      I examined Alexa Otero at bedside.  The patient maintains capacity for complex medical decision-making.  I spoke with pt and spouse at .    Pt states she feels well today. D/w nursing and no significant events overnight.     Pt states she has a couple episodes of sob but it only occurs when her oxygen slips out of her nose. Once O2 is reposititoned she recovers quickly. She has not required any medications for distress.     She let me know all of her family and friends are arriving this weekend and she is planning on saying her goodbyes. She has gotten her affairs in order. She feels she will be ready to transition to comfort focused care on Monday.     We spoke back and forth about what it will look like transitioning to comfort care. She is happy to hear that she will not be on vapotherm and states it is loud. She is looking forward to a peaceful and comfortable environment and transition.     She remains in good spirits. She is very receptive of her overall condition. Her only concern is that she will not suffer and will be provided with medications if she experiences sob. I assured her we will make sure she has adequate medications to ease her symptoms. Pt also requested that she can remain in her current room. I let her know I would do my best to  advocate for this and that I would let her team know her request.     I updated her medical team and CM.     We appreciate being involved in pts care. We will follow up on Monday and asst with comfort measures. Please reach out if we can be of any assistance.                Subjective:     Chief Complaint:   Chief Complaint   Patient presents with    Generalized Weakness     Per EMS, patient slipped out of bed onto floor hitting bottom. Did not hit head. Realized she was weaker than normal. Hx lung cancer. Sating 60s  on 4L in triage.       HPI:   From admission HPI:  84-year-old female with a past medical history of lung cancer actively getting chemo last of which was 5 days ago, who presented to the ER because of generalized weakness.  According to the patient, she woke up today, tried to get out of bed, but felt very weak, and slid beside the bed.  Did not hit her head, and did not lose consciousness.  She however could not get up however, and her  could not assist.  911 was called.  On arrival of EMS, patient was satting 88% on 4 L, and appeared to be short of breath.  She was brought to the ER for evaluation.   In the ER, vitals showed a blood pressure of 145/65, heart rate of 103, respiratory rate of 20, afebrile satting 60% on 4 L.  Immediately patient was placed on non-rebreather.  CBC with white count of 16.8, and macrocytic anemia.  CMP showed hypokalemia of 3.4.  First troponin is elevated at 46.  Point of care Lactic acid was 2.1.  Blood cultures were collected.  EKG showed sinus rhythm.  Chest x-ray showed no significant change of bilateral diffuse mixed interstitial and airspace lung opacities.  CTA chest was done, and it was negative for PE, but showed extensive interstitial and airspace opacities throughout both lungs, having significantly worsened in the left lung compared to prior CT. Unchanged small to moderate sized bilateral pleural effusions.  Cefepime was ordered, and patient will be  admitted to Medicine for further care of her severe sepsis.    Palliative medicine consult:  Pt currently admitted and being tx for acute hypoxemic resp failure and pneumonia. She has h/o lung cancer and was on alimta tx. Chemo currently on hold. Pts O2 requirements have increased and she is currently on vapotherm at 30 L and 85% FiO2. We have been consulted for goals of care discussion.       Hospital Course:  No notes on file    Interval History: Pt resting comfortably in bed. Family present at BS. No significant changes overnight.     Medications:  Continuous Infusions:  Scheduled Meds:   albuterol-ipratropium  3 mL Nebulization Q6H WAKE    amiodarone  200 mg Oral BID    apixaban  2.5 mg Oral BID    electrolytes-dextrose  200 mL Oral Q4H    folic acid  1 mg Oral Daily    gabapentin  100 mg Oral BID    insulin aspart U-100  5 Units Subcutaneous TIDWM    magnesium oxide  400 mg Oral Daily    methylPREDNISolone injection (PEDS and ADULTS)  60 mg Intravenous Q6H    metoprolol succinate  25 mg Oral Daily    pantoprazole  40 mg Oral Daily    polyethylene glycol  17 g Oral Daily    sucralfate  1 g Oral QID    sulfamethoxazole-trimethoprim 800-160mg  1 tablet Oral Every other day    tamsulosin  0.4 mg Oral Daily     PRN Meds:  Current Facility-Administered Medications:     0.9%  NaCl infusion (for blood administration), , Intravenous, Q24H PRN    acetaminophen, 650 mg, Oral, Q8H PRN    acetaminophen, 650 mg, Oral, Q4H PRN    aluminum-magnesium hydroxide-simethicone, 30 mL, Oral, QID PRN    dextrose 50%, 12.5 g, Intravenous, PRN    dextrose 50%, 25 g, Intravenous, PRN    glucagon (human recombinant), 1 mg, Intramuscular, PRN    glucose, 16 g, Oral, PRN    glucose, 24 g, Oral, PRN    HYDROcodone-acetaminophen, 1 tablet, Oral, Q6H PRN    insulin aspart U-100, 0-10 Units, Subcutaneous, QID (AC + HS) PRN    lactulose, 20 g, Oral, Q6H PRN    LORazepam, 0.5 mg, Oral, Q8H PRN    magnesium oxide, 800 mg, Oral, PRN    magnesium  oxide, 800 mg, Oral, PRN    melatonin, 6 mg, Oral, Nightly PRN    naloxone, 0.02 mg, Intravenous, PRN    ondansetron, 4 mg, Intravenous, Q6H PRN    potassium bicarbonate, 35 mEq, Oral, PRN    potassium bicarbonate, 50 mEq, Oral, PRN    potassium bicarbonate, 60 mEq, Oral, PRN    potassium, sodium phosphates, 2 packet, Oral, PRN    potassium, sodium phosphates, 2 packet, Oral, PRN    potassium, sodium phosphates, 2 packet, Oral, PRN    sodium chloride 0.9%, 2 mL, Intravenous, Q12H PRN    Objective:     Vital Signs (Most Recent):  Temp: 97.6 °F (36.4 °C) (11/15/24 0759)  Pulse: 65 (11/15/24 0751)  Resp: (!) 21 (11/15/24 0751)  BP: 137/63 (11/15/24 0759)  SpO2: 100 % (11/15/24 0751) Vital Signs (24h Range):  Temp:  [97.3 °F (36.3 °C)-98 °F (36.7 °C)] 97.6 °F (36.4 °C)  Pulse:  [65-78] 65  Resp:  [18-21] 21  SpO2:  [93 %-100 %] 100 %  BP: (121-147)/(53-66) 137/63     Weight: 49.5 kg (109 lb 2 oz)  Body mass index is 18.73 kg/m².       Physical Exam  Vitals and nursing note reviewed.   Constitutional:       General: She is not in acute distress.     Appearance: She is ill-appearing.   HENT:      Mouth/Throat:      Mouth: Mucous membranes are moist.   Eyes:      General: No scleral icterus.     Pupils: Pupils are equal, round, and reactive to light.   Cardiovascular:      Rate and Rhythm: Normal rate and regular rhythm.      Pulses: Normal pulses.   Pulmonary:      Effort: No respiratory distress.      Comments: Mild tachypnea when speaking, able to complete full sentences. Vapotherm in use.   Skin:     General: Skin is warm and dry.   Neurological:      General: No focal deficit present.      Mental Status: She is alert and oriented to person, place, and time.   Psychiatric:         Mood and Affect: Mood normal.         Behavior: Behavior normal.         Thought Content: Thought content normal.         Judgment: Judgment normal.            Review of Symptoms      Symptom Assessment (ESAS 0-10 Scale)  Pain:  0  Dyspnea:   0  Anxiety:  0  Nausea:  0  Depression:  0  Anorexia:  0  Fatigue:  0  Insomnia:  0  Restlessness:  0  Agitation:  0         Performance Status:  30    Living Arrangements:  Lives with spouse    Psychosocial/Cultural:   See Palliative Psychosocial Note: No  **Primary  to Follow**  Palliative Care  Consult: No        Advance Care Planning  Advance Directives:   Living Will: Yes        Copy on chart: Yes    Do Not Resuscitate Status: Yes    Medical Power of : Yes    Goals of Care: What is most important right now is to focus on comfort and QOL . Accordingly, we have decided that the best plan to meet the patient's goals includes enrolling in hospice care.         Significant Labs: All pertinent labs within the past 24 hours have been reviewed.  CBC:   Recent Labs   Lab 11/15/24  0448   WBC 15.42*   HGB 7.6*   HCT 23.9*   *        BMP:  Recent Labs   Lab 11/15/24  0448   *      K 5.2*   CL 99   CO2 36*   BUN 50*   CREATININE 1.4   CALCIUM 7.9*   MG 1.8     LFT:  Lab Results   Component Value Date    AST 12 11/15/2024    ALKPHOS 43 (L) 11/15/2024    BILITOT 0.3 11/15/2024     Albumin:   Albumin   Date Value Ref Range Status   11/15/2024 2.3 (L) 3.5 - 5.2 g/dL Final     Protein:   Total Protein   Date Value Ref Range Status   11/15/2024 4.5 (L) 6.0 - 8.4 g/dL Final     Lactic acid:   Lab Results   Component Value Date    LACTATE 0.7 11/01/2024    LACTATE 0.7 12/10/2023       Significant Imaging: I have reviewed all pertinent imaging results/findings within the past 24 hours.    > 55 min visit spent in chart review, face to face discussion of goals of care,  symptom assessment, coordination of care and emotional support.     Sudha Briceno NP  Palliative Medicine  [facility]

## 2024-11-15 NOTE — PLAN OF CARE
11/15/24 0751   Patient Assessment/Suction   Level of Consciousness (AVPU) alert   Respiratory Effort Normal;Unlabored   Expansion/Accessory Muscles/Retractions no use of accessory muscles   All Lung Fields Breath Sounds Anterior:;Lateral:;diminished   Rhythm/Pattern, Respiratory pattern regular   Cough Frequency infrequent   Cough Type nonproductive   Skin Integrity   $ Wound Care Tech Time 15 min   Area Observed Left;Right;Behind ear;Nares   Skin Appearance without discoloration   PRE-TX-O2   Device (Oxygen Therapy) high flow nasal cannula w/device   $ Is the patient on High Flow Oxygen? Yes   $ Noninvasive Daily Charge Noninvasive Daily   Humidification temp set 33   Humidification temp actual 33   Flow (L/min) (Oxygen Therapy) 40   Oxygen Concentration (%) 100   SpO2 100 %   Pulse Oximetry Type Continuous   $ Pulse Oximetry - Multiple Charge Pulse Oximetry - Multiple   Pulse 65   Resp (!) 21   Aerosol Therapy   $ Aerosol Therapy Charges Aerosol Treatment  (duoneb)   Daily Review of Necessity (SVN) completed   Respiratory Treatment Status (SVN) given   Treatment Route (SVN) in-line   Patient Position HOB elevated   Post Treatment Assessment (SVN) breath sounds unchanged   Signs of Intolerance (SVN) none   Breath Sounds Post-Respiratory Treatment   Throughout All Fields Post-Treatment All Fields   Throughout All Fields Post-Treatment no change   Post-treatment Heart Rate (beats/min) 66   Post-treatment Resp Rate (breaths/min) 19   Incentive Spirometer   $ Incentive Spirometer Charges unable to perform   General Safety Checklist   Safety Promotion/Fall Prevention side rails raised   Ready to Wean/Extubation Screen   FIO2<=50 (chart decimal) (!) 1   Education   $ Education Bronchodilator;Oxygen;15 min

## 2024-11-15 NOTE — PROGRESS NOTES
[facility] Medicine  Progress Note    Patient Name: Alexa Otero  MRN: [id number]  Patient Class: IP- Inpatient   Admission Date: 11/1/2024  Length of Stay: 14 days  Attending Physician: Judy Birmingham MD  Primary Care Provider: Idalia Greco MD        Subjective:     Principal Problem:Acute on chronic hypoxic respiratory failure        HPI:  84-year-old female with a past medical history of lung cancer actively getting chemo last of which was 5 days ago, who presented to the ER because of generalized weakness.  According to the patient, she woke up today, tried to get out of bed, but felt very weak, and slid beside the bed.  Did not hit her head, and did not lose consciousness.  She however could not get up however, and her  could not assist.  911 was called.  On arrival of EMS, patient was satting 88% on 4 L, and appeared to be short of breath.  She was brought to the ER for evaluation.    In the ER, vitals showed a blood pressure of 145/65, heart rate of 103, respiratory rate of 20, afebrile satting 60% on 4 L.  Immediately patient was placed on non-rebreather.  CBC with white count of 16.8, and macrocytic anemia.  CMP showed hypokalemia of 3.4.  First troponin is elevated at 46.  Point of care Lactic acid was 2.1.  Blood cultures were collected.  EKG showed sinus rhythm.  Chest x-ray showed no significant change of bilateral diffuse mixed interstitial and airspace lung opacities.  CTA chest was done, and it was negative for PE, but showed extensive interstitial and airspace opacities throughout both lungs, having significantly worsened in the left lung compared to prior CT. Unchanged small to moderate sized bilateral pleural effusions.  Cefepime was ordered, and patient will be admitted to Medicine for further care of her severe sepsis.    Overview/Hospital Course:  Ms. Otero has been monitored closely during her hospitalization. She was admitted on 11/1/2024 with acute  on chronic hypoxemic resp failure. CTA chest reveals extensive interstitial opacities c/w multifocal pna and/or ARDS. She requires high flow nasal cannula up to 15L now on 13L. Pulm and ID have been consulted. Pulm recommends broad spectrum abx - cefepime, doxy, and vanc initially. MRSA screen negative and vanc d/c'ed. She has stage IV lung ca and completed Carbo/Pemetrexed last year and is currently on pemetrexed (Alimta) maintenance with last dose 10/28. This can cause an interstitial pneumonitis and pulm has started prednisone. Plan for bronchoscopy on Monday if no improvement and recommended continuing eliquis. BiPAP qHS and naps. Duonebs Q6h. ID has ordered a respiratory infection panel that is negative. She's had abdominal bloating and discomfort for some time and had an outpt CT abd/pelvis with gastric wall thickening that is is nonspecific could be related to gastritis. She has a history of peptic ulcers and PCP started protonix/carafate. KUB on 11/2 with nonobstructive bowel gas pattern and moderate stool burden and pt was treated with laxative suppository. She has chronic macrocytic anemia that appears at baseline. She had a leukocytosis with left shift on admission that has trended down from 16K-->12K-->8K. CRP 36, procal 2.9. Pt had a BM on 11/2, but abd remained distended. She was found to be retaining a significant amount of urine on bladder scan >900 and priest was placed with 1500mL out with resolution of abd distention. Pulm increased steriods on 11/3 to IV solumedrol, she's requiring BiPAP support and was given IV lasix. Macrocytic anemia at baseline on admission but trending down and she will receive 1 unit PRBCs 11/4. She has developed steroid induced hyperglycemia and insulin sliding titrated up to moderate dose for tighter glucose control. Priest d/c'ed on 11/5 and had to be reinserted on 11/06. PT/OT consulted and pt up to chair on 11/5. She desatted with movement and took some time and  increased supplemental O2 to recover. Prior to moving to the chair, O2 was weaned down to 10L high flow. 11/6/2024 pulmonology change patient to p.o. prednisone as her oxygen demand had improved.  Patient was being maintained on approximately 5 L of oxygen.  11/11/24 patient was working with physical therapy when she became hypoxic requiring Vapotherm.  At this time she is currently requiring max dose of Vapotherm.  Bronchoscopy is not suggested by pulmonology due to patient's fragile condition.  Patient was switched back to IV Solu-Medrol q.6 I was per pulmonology.  Long discussion was had with the patient concerning inpatient hospice versus comfort measures, patient and spouse would like comfort measures but not until the patient saw her family.  She was seen and evaluated by pulmonology as well as palliative Medicine she requested to remain her present room until her family was present in the action was made to transfer to comfort care.    Interval History:  She was seen and examined at bedside during morning rounds.  Reports no new events overnight.  Requesting not to be transferred to a different room until her family arrives in the action is made to transfer her to palliative care.    Review of Systems  Objective:     Vital Signs (Most Recent):  Temp: 97.6 °F (36.4 °C) (11/15/24 0759)  Pulse: 65 (11/15/24 0751)  Resp: (!) 21 (11/15/24 0751)  BP: 137/63 (11/15/24 0759)  SpO2: 100 % (11/15/24 0751) Vital Signs (24h Range):  Temp:  [97.3 °F (36.3 °C)-98 °F (36.7 °C)] 97.6 °F (36.4 °C)  Pulse:  [65-78] 65  Resp:  [18-21] 21  SpO2:  [93 %-100 %] 100 %  BP: (121-147)/(53-66) 137/63     Weight: 49.5 kg (109 lb 2 oz)  Body mass index is 18.73 kg/m².    Intake/Output Summary (Last 24 hours) at 11/15/2024 1017  Last data filed at 11/14/2024 2300  Gross per 24 hour   Intake 740 ml   Output 1400 ml   Net -660 ml         Physical Exam        Significant Labs: All pertinent labs within the past 24 hours have been  reviewed.    Significant Imaging: I have reviewed all pertinent imaging results/findings within the past 24 hours.    Assessment/Plan:      * Acute on chronic hypoxic respiratory failure  Likely secondary to her bilateral extensive pneumonia vs pneumonitis  On 4L NC at home  Currently on high flow nasal cannula and intermittent bipap  Pulm consulted   CTA chest ruled out PE.  Echo done last month showed normal systolic function.   Currently on high flow 35%  P.o. steroids changed to Solu-Medrol  Bactrim to prevent PCP  Respiratory status is not improving.  Palliative care consulted.      Goals of care, counseling/discussion  Advance Care Planning    See palliative care note    Urinary retention  Constipation likely contributing  She had a couple of Bms and started bowel regimen  Priest placed for significant retention   D/C priest 11/5 and had to be reinserted on 11/6  Flomax initiated  Urology consulted:  Recommended follow up outpatient.  Keep Priest in place for 5-7 days before voiding trial.  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 Most recent hemoglobin and hematocrit are listed below.  Recent Labs     11/11/24  0509 11/12/24  0511 11/13/24  0459   HGB 8.5* 7.9* 7.5*   HCT 27.0* 24.9* 23.7*       Plan  - Monitor serial CBC: Daily  - Transfuse PRBC if patient becomes hemodynamically unstable, symptomatic or H/H drops below 7/21.  - Patient has received 1 units of PRBCs on 11/4  - Patient's anemia is currently decreasing  - T&S in AM in the event she requires a transfusion    Pneumonitis  Patient was started on cefepime, doxy for atypicals, and Vanc, vanc d/c'ed with negative MRSA screen  Pulmonary and Infectious Disease following  Steroids increased by pulmonology   Antibiotic therapy complete    Antibiotics (From admission, onward)      Start     Stop Route Frequency Ordered    11/12/24 0900  sulfamethoxazole-trimethoprim 800-160mg per tablet 1 tablet          -- Oral Every other day 11/11/24 0839    11/01/24 1300  mupirocin 2 % ointment         11/06/24 0859 Nasl 2 times daily 11/01/24 1200            Microbiology Results (last 7 days)       ** No results found for the last 168 hours. **            Pleural effusion  Bilateral, stable.   Pulm consulted  In the setting of lung ca      Malignant neoplasm of lower lobe of right lung  Carbo/Pemetrexed until 4/2024 now on Pemetrexed maintenance with last dose 10/28/24  Consult pt's oncologist, Dr. Cassidy following      Atrial fibrillation  Patient has paroxysmal (<7 days) atrial fibrillation. Patient is currently in sinus rhythm. [id number] Score: 5. The patients heart rate in the last 24 hours is as follows:  Pulse  Min: 68  Max: 81     Antiarrhythmics  Amiodarone and metoprolol    Anticoagulants  Eliquis    Plan  - Replete lytes with a goal of K>4, Mg >2  - Patient is anticoagulated, see medications listed above.  - Patient's afib is currently controlled    ACP (advance care planning)  See palliative care consult  Patient and spouse would like comfort measures but not until their family arrives.      VTE Risk Mitigation (From admission, onward)           Ordered     apixaban tablet 2.5 mg  2 times daily         11/01/24 1316     IP VTE HIGH RISK PATIENT  Once         11/01/24 1158     Place sequential compression device  Until discontinued         11/01/24 1158                    Discharge Planning   ALYCIA:      Code Status: DNR   Is the patient medically ready for discharge?:     Reason for patient still in hospital (select all that apply): Patient unstable  Discharge Plan A: Comfort care/withdrawal   Discharge Delays: None known at this time              John Islas NP  Department of Hospital Medicine   [facility]

## 2024-11-15 NOTE — CARE UPDATE
11/14/24 1942   Patient Assessment/Suction   Level of Consciousness (AVPU) alert   Respiratory Effort Unlabored   Expansion/Accessory Muscles/Retractions no retractions;expansion symmetric   All Lung Fields Breath Sounds Anterior:   ROSINA Breath Sounds Lateral:;coarse   Skin Integrity   $ Wound Care Tech Time 15 min   Area Observed Left;Right;Behind ear;Nares   Skin Appearance without discoloration   PRE-TX-O2   Device (Oxygen Therapy) high flow nasal cannula w/device   Flow (L/min) (Oxygen Therapy) 35   Oxygen Concentration (%) 100   SpO2 (!) 93 %   Pulse Oximetry Type Continuous   $ Pulse Oximetry - Multiple Charge Pulse Oximetry - Multiple   Pulse 75   Resp 18   Aerosol Therapy   $ Aerosol Therapy Charges Aerosol Treatment   Daily Review of Necessity (SVN) completed   Respiratory Treatment Status (SVN) given   Treatment Route (SVN) in-line   Patient Position HOB elevated   Post Treatment Assessment (SVN) breath sounds unchanged   Signs of Intolerance (SVN) none   Breath Sounds Post-Respiratory Treatment   Throughout All Fields Post-Treatment All Fields   Throughout All Fields Post-Treatment no change   Post-treatment Heart Rate (beats/min) 78   Post-treatment Resp Rate (breaths/min) 18   Incentive Spirometer   $ Incentive Spirometer Charges done with encouragement   Incentive Spirometer Predicted Level (mL) 1700   Administration (IS) self-administered   Number of Repetitions (IS) 5   Level Incentive Spirometer (mL) 250   Patient Tolerance (IS) poor   Ready to Wean/Extubation Screen   FIO2<=50 (chart decimal) (!) 1   Education   $ Education Oxygen;Bronchodilator;Incentive Spirometry;15 min

## 2024-11-15 NOTE — PROGRESS NOTES
Pulmonary/Critical Care Progress Note      PATIENT NAME: Alexa Otero  MRN: [id number]  TODAY'S DATE: 11/15/2024  11:55 AM  ADMIT DATE: 2024  AGE: 84 y.o. 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    She reports progressive fatigue weakness prior to admission to the hospital.  She reports that she slid out of her bed and that is the reason she came to the hospital.  She reports that she has not had any fever cough night sweats, but she has some worsening shortness of breath.    11/3   - rough night, had worsening shortness of breath and is required non-rebreather in addition no nasal cannula currently she is on high-flow at 10 liters/minute     the patient is feeling a little better today.  She is on 15 L high-flow nasal cannula.  Her CT shows her chronically infiltrated right lung with the surrounding effusion.  Her left lung which was normal looking 2 weeks ago now is diffusely infiltrated with ground-glass opacities.  This must be the Alimta.     the patient is improving with the steroids.  She looks much brighter today.  She is feeling better.  She is on 10 L high-flow nasal cannula.     the patient is very sleepy today.  The nurse reports she was up earlier and ate breakfast and sat up.  Her oxygen is set on 10 L.  Her sats remain 99% on 5 L. I have asked the nurse to try to wean this.     the patient is looking good today.  She is down to 3 L nasal cannula.  Her chest x-ray is improving but her left thorax is not yet normal. We need to get her priest out and her walking.     the patient is too  weak to participate with PT she says.  She also states that it makes her very short of breath.  Her saturations are difficult to .  Urology would like to leave the Rausch in.  Obviously, the patient is going to need to go to a nursing home from here.  She states she will be here over the weekend.    11/9 the patient states physical therapy has not yet come to see her today.  States she is feeling good from the waist up.  She is on 4 L nasal cannula.    11/10 the patient was not seen by physical therapy yesterday.  This is a shame.  She is concerned about still having a Rausch catheter.  Explained that Dr. Jon isn't coming to the hospital to see her, but she needs to get to him to be evaluated. The patient's oxygen requirements are higher again today.  Will increase her prednisone to 60 mg and see if we can not stabilize this.      11/11 .  The patient's breathing is not as good.  She is dyspneic with speech.  Will resume Solu-Medrol.  Have added Bactrim DS q.o.d. for PCP prevention.    11/12 The patient is now requiring nearly maximum vapotherm to oxygenate.  Her CXR is not appreciably changed.  Have pushed her back to 60 q 6 of solumedrol. Discussed with Dr. Cassidy the utility of bronchoscopy.  Given her compromised state she would need to be intubated and I don't know if I could get her extubated. Unfortunately, all we have to offer is steroids and will see if she turns around again.     11/13 the patient is on 30 L and 85% Vapotherm.  Looks very fatigued.  She had multiple questions after being visited by Sudha from palliative care.  Obviously, the patient can not go home.  We have no way to get enough oxygen to her.  I think that if she is not better by tomorrow we should choose hospice and then the 2 choices will be to try to get her to an LTAC with hospice or to do inpatient hospice here and withdraw care.  Discussed the patient, her , and her daughter.    11/14 the patient is still  requiring maximum Vapotherm to oxygenate.  She desaturates severely when she eats.  She has decided to go to hospice next Monday or Tuesday after her friends and family have visited.  We can certainly move the patient to a medical-surgical unit.    11/15/2024 - Stable overnight still on vapotherm and desats easily.  No distress and we discussed her goals and she wants to focus on comfort.    REVIEW OF SYSTEMS  General: Feeling weak  Eyes: Vision is good.  ENT:  No sinusitis or pharyngitis.   Heart: No chest pain or palpitations.  Lungs:  Feeling more short of breath.  GI: No Nausea, vomiting, constipation, diarrhea, or reflux.  : No dysuria, hesitancy, or nocturia.  Skin: No lesions or rashes.  Musculoskeletal:  Very weak..  Neuro: No headaches or neuropathy.  Lymph: No edema or adenopathy.  Psych: No anxiety or depression.  Endo: No weight change.        No change in the patient's Past Medical History, Past Surgical History, Social History or Family History since admission.    VITAL SIGNS (MOST RECENT)  Temp: 97.7 °F (36.5 °C) (11/15/24 1152)  Pulse: 68 (11/15/24 1152)  Resp: 20 (11/15/24 1152)  BP: 139/64 (11/15/24 1152)  SpO2: (!) 93 % (11/15/24 1152)    INTAKE AND OUTPUT (LAST 24 HOURS):  Intake/Output Summary (Last 24 hours) at 11/15/2024 1329  Last data filed at 11/15/2024 1033  Gross per 24 hour   Intake 240 ml   Output 1750 ml   Net -1510 ml       WEIGHT  Wt Readings from Last 1 Encounters:   11/01/24 49.5 kg (109 lb 2 oz)         PHYSICAL EXAM  GENERAL: Older patient in no distress, looking frail.  HEENT: Pupils equal and reactive. Extraocular movements intact. Nose intact.  Pharynx moist.  NECK: Supple.   HEART: Regular rate and rhythm. No murmur or gallop auscultated.  LUNGS:  There are diminished breath sounds on the right.  On the left there are diffuse crackles anteriorly and posteriorly. Lung excursion symmetrical. No change in fremitus.  There is a port in thorax which is accessed.  ABDOMEN: Bowel  sounds present. Non-tender, no masses palpated.  : Normal anatomy. Rausch with clear urine.  EXTREMITIES: Normal muscle tone and joint movement, no cyanosis or clubbing.  She is very weak.  LYMPHATICS: No adenopathy palpated, no edema.  SKIN: Dry, intact, no lesions.  There is a stage I decubitus on the buttock.    NEURO: Cranial nerves II-XII intact. Motor strength 5/5 bilaterally, upper and lower extremities.  PSYCH: Appropriate affect.        CBC LAST (LAST 24 HOURS)  Recent Labs   Lab 11/15/24  0448   WBC 15.42*   RBC 2.35*   HGB 7.6*   HCT 23.9*   *   MCH 32.3*   MCHC 31.8*   RDW 17.8*      MPV 10.2   GRAN 93.7*  14.5*   LYMPH 0.7*  0.1*   MONO 4.2  0.7   BASO 0.01   NRBC 0       CHEMISTRY LAST (LAST 24 HOURS)  Recent Labs   Lab 11/15/24  0448      K 5.2*   CL 99   CO2 36*   ANIONGAP 2*   BUN 50*   CREATININE 1.4   *   CALCIUM 7.9*   MG 1.8   ALBUMIN 2.3*   PROT 4.5*   ALKPHOS 43*   ALT 30   AST 12   BILITOT 0.3         LAST 7 DAYS MICROBIOLOGY   Microbiology Results (last 7 days)       ** No results found for the last 168 hours. **            MOST RECENT IMAGING  X-Ray Chest AP Portable  Narrative: EXAMINATION:  XR CHEST AP PORTABLE    CLINICAL HISTORY:  pneumonitis;    FINDINGS:  Portable chest radiograph at 06:00 hours compared to numerous prior exams including 11/10/2024 shows no significant interval change.  Impression: No significant interval change.    Electronically signed by: Davide Jeffrey  Date:    11/11/2024  Time:    09:04      CURRENT VISIT EKG  Results for orders placed or performed during the hospital encounter of 11/01/24   EKG 12-lead   Result Value Ref Range    QRS Duration 56 ms    [facility] QTC Calculation 482 ms    Narrative    Test Reason : R06.02,    Vent. Rate : 097 BPM     Atrial Rate : 097 BPM     P-R Int : 156 ms          QRS Dur : 056 ms      QT Int : 380 ms       P-R-T Axes : 044 -06 100 degrees     QTc Int : 482 ms    Normal sinus rhythm  Low voltage  QRS  Septal infarct (cited on or before 13-OCT-2023)  Abnormal ECG  When compared with ECG of 25-OCT-2024 10:48,  ST now depressed in Lateral leads    Referred By: [email or web address]   SELF           Confirmed By:        ECHOCARDIOGRAM RESULTS  Results for orders placed during the hospital encounter of 10/07/24    Echo    Interpretation Summary    Left Ventricle: The left ventricle is normal in size. Mildly increased wall thickness. There is mild concentric hypertrophy. Moderate global hypokinesis present. There is mildly reduced systolic function with a visually estimated ejection fraction of 40 - 45%. There is normal diastolic function.    Right Ventricle: Normal right ventricular cavity size. Wall thickness is normal. Systolic function is normal.    Left Atrium: Left atrium is moderately dilated.    Mitral Valve: There is bileaflet sclerosis. There is moderate mitral annular calcification present. There is moderate stenosis. The mean pressure gradient across the mitral valve is 8 mmHg at a heart rate of  bpm. There is mild regurgitation with a centrally directed jet.    Pulmonary Artery: The estimated pulmonary artery systolic pressure is 29 mmHg.    IVC/SVC: Normal venous pressure at 3 mmHg.        Oxygen INFORMATION  Oxygen Concentration (%):  [100] 100   Vapotherms 35l/min and 90%           PLAN:.      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  Her right lung is basically nonfunctional.  This is either her cancer or her Alimta.  Bronching her would leave her stuck on a ventilator.  The patient has decided to do hospice. She will wait until Monday or Tuesday after she has had time to visit with family and friends.  Suggest moving to med surg floor.      Virgil Padilla MD  [facility] Pulmonary/Critical Care  11/15/2024

## 2024-11-15 NOTE — SUBJECTIVE & OBJECTIVE
Interval History: Pt resting comfortably in bed. Family present at BS. No significant changes overnight.     Medications:  Continuous Infusions:  Scheduled Meds:   albuterol-ipratropium  3 mL Nebulization Q6H WAKE    amiodarone  200 mg Oral BID    apixaban  2.5 mg Oral BID    electrolytes-dextrose  200 mL Oral Q4H    folic acid  1 mg Oral Daily    gabapentin  100 mg Oral BID    insulin aspart U-100  5 Units Subcutaneous TIDWM    magnesium oxide  400 mg Oral Daily    methylPREDNISolone injection (PEDS and ADULTS)  60 mg Intravenous Q6H    metoprolol succinate  25 mg Oral Daily    pantoprazole  40 mg Oral Daily    polyethylene glycol  17 g Oral Daily    sucralfate  1 g Oral QID    sulfamethoxazole-trimethoprim 800-160mg  1 tablet Oral Every other day    tamsulosin  0.4 mg Oral Daily     PRN Meds:  Current Facility-Administered Medications:     0.9%  NaCl infusion (for blood administration), , Intravenous, Q24H PRN    acetaminophen, 650 mg, Oral, Q8H PRN    acetaminophen, 650 mg, Oral, Q4H PRN    aluminum-magnesium hydroxide-simethicone, 30 mL, Oral, QID PRN    dextrose 50%, 12.5 g, Intravenous, PRN    dextrose 50%, 25 g, Intravenous, PRN    glucagon (human recombinant), 1 mg, Intramuscular, PRN    glucose, 16 g, Oral, PRN    glucose, 24 g, Oral, PRN    HYDROcodone-acetaminophen, 1 tablet, Oral, Q6H PRN    insulin aspart U-100, 0-10 Units, Subcutaneous, QID (AC + HS) PRN    lactulose, 20 g, Oral, Q6H PRN    LORazepam, 0.5 mg, Oral, Q8H PRN    magnesium oxide, 800 mg, Oral, PRN    magnesium oxide, 800 mg, Oral, PRN    melatonin, 6 mg, Oral, Nightly PRN    naloxone, 0.02 mg, Intravenous, PRN    ondansetron, 4 mg, Intravenous, Q6H PRN    potassium bicarbonate, 35 mEq, Oral, PRN    potassium bicarbonate, 50 mEq, Oral, PRN    potassium bicarbonate, 60 mEq, Oral, PRN    potassium, sodium phosphates, 2 packet, Oral, PRN    potassium, sodium phosphates, 2 packet, Oral, PRN    potassium, sodium phosphates, 2 packet, Oral, PRN     sodium chloride 0.9%, 2 mL, Intravenous, Q12H PRN    Objective:     Vital Signs (Most Recent):  Temp: 97.6 °F (36.4 °C) (11/15/24 0759)  Pulse: 65 (11/15/24 0751)  Resp: (!) 21 (11/15/24 0751)  BP: 137/63 (11/15/24 0759)  SpO2: 100 % (11/15/24 0751) Vital Signs (24h Range):  Temp:  [97.3 °F (36.3 °C)-98 °F (36.7 °C)] 97.6 °F (36.4 °C)  Pulse:  [65-78] 65  Resp:  [18-21] 21  SpO2:  [93 %-100 %] 100 %  BP: (121-147)/(53-66) 137/63     Weight: 49.5 kg (109 lb 2 oz)  Body mass index is 18.73 kg/m².       Physical Exam  Vitals and nursing note reviewed.   Constitutional:       General: She is not in acute distress.     Appearance: She is ill-appearing.   HENT:      Mouth/Throat:      Mouth: Mucous membranes are moist.   Eyes:      General: No scleral icterus.     Pupils: Pupils are equal, round, and reactive to light.   Cardiovascular:      Rate and Rhythm: Normal rate and regular rhythm.      Pulses: Normal pulses.   Pulmonary:      Effort: No respiratory distress.      Comments: Mild tachypnea when speaking, able to complete full sentences. Vapotherm in use.   Skin:     General: Skin is warm and dry.   Neurological:      General: No focal deficit present.      Mental Status: She is alert and oriented to person, place, and time.   Psychiatric:         Mood and Affect: Mood normal.         Behavior: Behavior normal.         Thought Content: Thought content normal.         Judgment: Judgment normal.            Review of Symptoms      Symptom Assessment (ESAS 0-10 Scale)  Pain:  0  Dyspnea:  0  Anxiety:  0  Nausea:  0  Depression:  0  Anorexia:  0  Fatigue:  0  Insomnia:  0  Restlessness:  0  Agitation:  0         Performance Status:  30    Living Arrangements:  Lives with spouse    Psychosocial/Cultural:   See Palliative Psychosocial Note: No  **Primary  to Follow**  Palliative Care  Consult: No        Advance Care Planning   Advance Directives:   Living Will: Yes        Copy on chart: Yes    Do  Not Resuscitate Status: Yes    Medical Power of : Yes    Goals of Care: What is most important right now is to focus on comfort and QOL . Accordingly, we have decided that the best plan to meet the patient's goals includes enrolling in hospice care.         Significant Labs: All pertinent labs within the past 24 hours have been reviewed.  CBC:   Recent Labs   Lab 11/15/24  0448   WBC 15.42*   HGB 7.6*   HCT 23.9*   *        BMP:  Recent Labs   Lab 11/15/24  0448   *      K 5.2*   CL 99   CO2 36*   BUN 50*   CREATININE 1.4   CALCIUM 7.9*   MG 1.8     LFT:  Lab Results   Component Value Date    AST 12 11/15/2024    ALKPHOS 43 (L) 11/15/2024    BILITOT 0.3 11/15/2024     Albumin:   Albumin   Date Value Ref Range Status   11/15/2024 2.3 (L) 3.5 - 5.2 g/dL Final     Protein:   Total Protein   Date Value Ref Range Status   11/15/2024 4.5 (L) 6.0 - 8.4 g/dL Final     Lactic acid:   Lab Results   Component Value Date    LACTATE 0.7 11/01/2024    LACTATE 0.7 12/10/2023       Significant Imaging: I have reviewed all pertinent imaging results/findings within the past 24 hours.

## 2024-11-15 NOTE — SUBJECTIVE & OBJECTIVE
Interval History:  She was seen and examined at bedside during morning rounds.  Reports no new events overnight.  Requesting not to be transferred to a different room until her family arrives in the action is made to transfer her to palliative care.    Review of Systems  Objective:     Vital Signs (Most Recent):  Temp: 97.6 °F (36.4 °C) (11/15/24 0759)  Pulse: 65 (11/15/24 0751)  Resp: (!) 21 (11/15/24 0751)  BP: 137/63 (11/15/24 0759)  SpO2: 100 % (11/15/24 0751) Vital Signs (24h Range):  Temp:  [97.3 °F (36.3 °C)-98 °F (36.7 °C)] 97.6 °F (36.4 °C)  Pulse:  [65-78] 65  Resp:  [18-21] 21  SpO2:  [93 %-100 %] 100 %  BP: (121-147)/(53-66) 137/63     Weight: 49.5 kg (109 lb 2 oz)  Body mass index is 18.73 kg/m².    Intake/Output Summary (Last 24 hours) at 11/15/2024 1017  Last data filed at 11/14/2024 2300  Gross per 24 hour   Intake 740 ml   Output 1400 ml   Net -660 ml         Physical Exam        Significant Labs: All pertinent labs within the past 24 hours have been reviewed.    Significant Imaging: I have reviewed all pertinent imaging results/findings within the past 24 hours.

## 2024-11-15 NOTE — ASSESSMENT & PLAN NOTE
I reviewed the patient's chart and discussed the case with the patient's team.      I examined Alexa Otero at bedside.  The patient maintains capacity for complex medical decision-making.  I spoke with pt and spouse at .    Pt states she feels well today. D/w nursing and no significant events overnight.     Pt states she has a couple episodes of sob but it only occurs when her oxygen slips out of her nose. Once O2 is reposititoned she recovers quickly. She has not required any medications for distress.     She let me know all of her family and friends are arriving this weekend and she is planning on saying her goodbyes. She has gotten her affairs in order. She feels she will be ready to transition to comfort focused care on Monday.     We spoke back and forth about what it will look like transitioning to comfort care. She is happy to hear that she will not be on vapotherm and states it is loud. She is looking forward to a peaceful and comfortable environment and transition.     She remains in good spirits. She is very receptive of her overall condition. Her only concern is that she will not suffer and will be provided with medications if she experiences sob. I assured her we will make sure she has adequate medications to ease her symptoms. Pt also requested that she can remain in her current room. I let her know I would do my best to advocate for this and that I would let her team know her request.     I updated her medical team and CM.     We appreciate being involved in pts care. We will follow up on Monday and asst with comfort measures. Please reach out if we can be of any assistance.

## 2024-11-16 NOTE — CARE UPDATE
11/16/24 0718   Patient Assessment/Suction   Level of Consciousness (AVPU) alert   Respiratory Effort Unlabored   Expansion/Accessory Muscles/Retractions no use of accessory muscles   All Lung Fields Breath Sounds diminished   Rhythm/Pattern, Respiratory depth regular;pattern regular;unlabored   Cough Frequency no cough   PRE-TX-O2   Device (Oxygen Therapy)   (vapotherm)   $ Is the patient on High Flow Oxygen? Yes   $ Noninvasive Daily Charge Noninvasive Daily   Humidification temp set 33   Humidification temp actual 33   Flow (L/min) (Oxygen Therapy) 4   Oxygen Concentration (%) 100   SpO2 99 %   Pulse Oximetry Type Continuous   $ Pulse Oximetry - Multiple Charge Pulse Oximetry - Multiple   Pulse 68   Resp 18   Aerosol Therapy   $ Aerosol Therapy Charges Aerosol Treatment   Daily Review of Necessity (SVN) completed   Respiratory Treatment Status (SVN) given   Treatment Route (SVN) in-line   Patient Position semi-Morrell's   Post Treatment Assessment (SVN) increased aeration   Signs of Intolerance (SVN) none   Breath Sounds Post-Respiratory Treatment   Throughout All Fields Post-Treatment aeration increased   Post-treatment Heart Rate (beats/min) 69   Post-treatment Resp Rate (breaths/min) 15   Incentive Spirometer   $ Incentive Spirometer Charges proper technique demonstrated   Incentive Spirometer Predicted Level (mL) 1700   Administration (IS) proper technique demonstrated   Number of Repetitions (IS) 5   Level Incentive Spirometer (mL) 250   Patient Tolerance (IS) fair   Ready to Wean/Extubation Screen   FIO2<=50 (chart decimal) (!) 1   Education   $ Education Bronchodilator;15 min   Respiratory Evaluation   $ Care Plan Tech Time 15 min

## 2024-11-16 NOTE — CARE UPDATE
11/15/24 1922   Patient Assessment/Suction   Level of Consciousness (AVPU) alert   Respiratory Effort Normal;Unlabored   Expansion/Accessory Muscles/Retractions no use of accessory muscles;no retractions   All Lung Fields Breath Sounds diminished   Rhythm/Pattern, Respiratory unlabored;pattern regular;no shortness of breath reported   Cough Frequency infrequent   PRE-TX-O2   Device (Oxygen Therapy) high flow nasal cannula w/device   Humidification temp set 33   Humidification temp actual 33   Flow (L/min) (Oxygen Therapy) 40   Oxygen Concentration (%) 100   SpO2 100 %   Pulse Oximetry Type Continuous   Pulse 76   Resp 20   Temp 98 °F (36.7 °C)   Aerosol Therapy   $ Aerosol Therapy Charges Aerosol Treatment  (duo)   Daily Review of Necessity (SVN) completed   Respiratory Treatment Status (SVN) given   Treatment Route (SVN) in-line   Patient Position HOB elevated   Post Treatment Assessment (SVN) increased aeration   Signs of Intolerance (SVN) none   Breath Sounds Post-Respiratory Treatment   Throughout All Fields Post-Treatment All Fields   Throughout All Fields Post-Treatment aeration increased   Post-treatment Heart Rate (beats/min) 77   Post-treatment Resp Rate (breaths/min) 20   Incentive Spirometer   $ Incentive Spirometer Charges proper technique demonstrated;done with encouragement   Incentive Spirometer Predicted Level (mL) 1700   Administration (IS) instruction provided, follow-up;mouthpiece utilized;proper technique demonstrated   Number of Repetitions (IS) 5   Level Incentive Spirometer (mL) 250   Patient Tolerance (IS) no adverse signs/symptoms present   Ready to Wean/Extubation Screen   FIO2<=50 (chart decimal) (!) 1

## 2024-11-16 NOTE — PLAN OF CARE
Problem: Adult Inpatient Plan of Care  Goal: Plan of Care Review  Outcome: Progressing     Problem: Adult Inpatient Plan of Care  Goal: Optimal Comfort and Wellbeing  Outcome: Progressing     Problem: Sepsis/Septic Shock  Goal: Optimal Nutrition Intake  Outcome: Progressing     Problem: Pneumonia  Goal: Effective Oxygenation and Ventilation  Outcome: Progressing     Problem: Fall Injury Risk  Goal: Absence of Fall and Fall-Related Injury  Outcome: Progressing     Problem: Skin Injury Risk Increased  Goal: Skin Health and Integrity  Outcome: Progressing     Problem: Gas Exchange Impaired  Goal: Optimal Gas Exchange  Outcome: Progressing     Problem: Malnutrition  Goal: Improved Nutritional Intake  Outcome: Progressing     Problem: Coping Ineffective  Goal: Effective Coping  Outcome: Progressing     Problem: End-of-Life Care  Goal: Comfort, Peace and Preserved Dignity  Outcome: Progressing

## 2024-11-16 NOTE — SUBJECTIVE & OBJECTIVE
Interval History: no acute events overnight.  Says she is feeling pretty good today and had a good night's sleep. Blood glucose elevated likely 2/2 steroids.  Will continue prn aspart.  Considered long acting insulin but would prefer not to risk hypoglycemia as long as she is not having symptoms r/t hyperglycemia.  D/w patient, family and bedside nurse who are in agreement.    Review of Systems   Respiratory:  Positive for shortness of breath.    Psychiatric/Behavioral:  Negative for agitation and confusion.      Objective:     Vital Signs (Most Recent):  Temp: 97.7 °F (36.5 °C) (11/16/24 0805)  Pulse: 76 (11/16/24 1139)  Resp: 18 (11/16/24 1120)  BP: 126/65 (11/16/24 1139)  SpO2: 96 % (11/16/24 1139) Vital Signs (24h Range):  Temp:  [97.4 °F (36.3 °C)-98.5 °F (36.9 °C)] 97.7 °F (36.5 °C)  Pulse:  [64-76] 76  Resp:  [17-24] 18  SpO2:  [87 %-100 %] 96 %  BP: (115-134)/(60-65) 126/65     Weight: 49.5 kg (109 lb 2 oz)  Body mass index is 18.73 kg/m².    Intake/Output Summary (Last 24 hours) at 11/16/2024 1226  Last data filed at 11/16/2024 0903  Gross per 24 hour   Intake 1480 ml   Output 1375 ml   Net 105 ml         Physical Exam  Constitutional:       Appearance: Normal appearance.   HENT:      Head: Normocephalic.      Right Ear: Tympanic membrane normal.      Left Ear: Tympanic membrane normal.      Nose: Nose normal. No congestion or rhinorrhea.      Mouth/Throat:      Mouth: Mucous membranes are dry.      Pharynx: Oropharynx is clear. No oropharyngeal exudate or posterior oropharyngeal erythema.      Comments: Mucous membranes are tacky  Eyes:      Pupils: Pupils are equal, round, and reactive to light.   Cardiovascular:      Rate and Rhythm: Normal rate and regular rhythm.   Pulmonary:      Effort: Pulmonary effort is normal.      Breath sounds: Rhonchi present. No wheezing or rales.      Comments: Decreased aeration  Abdominal:      General: Abdomen is flat. There is no distension.      Palpations: Abdomen is  soft.   Musculoskeletal:         General: Normal range of motion.      Cervical back: Normal range of motion.   Skin:     General: Skin is warm and dry.   Neurological:      General: No focal deficit present.      Mental Status: She is alert.   Psychiatric:         Mood and Affect: Mood normal.             Significant Labs: All pertinent labs within the past 24 hours have been reviewed.  CBC:   Recent Labs   Lab 11/15/24  0448   WBC 15.42*   HGB 7.6*   HCT 23.9*        CMP:   Recent Labs   Lab 11/15/24  0448      K 5.2*   CL 99   CO2 36*   *   BUN 50*   CREATININE 1.4   CALCIUM 7.9*   PROT 4.5*   ALBUMIN 2.3*   BILITOT 0.3   ALKPHOS 43*   AST 12   ALT 30   ANIONGAP 2*       Significant Imaging: I have reviewed all pertinent imaging results/findings within the past 24 hours.

## 2024-11-16 NOTE — ASSESSMENT & PLAN NOTE
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 Most recent hemoglobin and hematocrit are listed below.  Recent Labs     11/14/24  0553 11/15/24  0448   HGB 7.3* 7.6*   HCT 23.3* 23.9*       Plan  - Monitor serial CBC: Daily  - Transfuse PRBC if patient becomes hemodynamically unstable, symptomatic or H/H drops below 7/21.  - Patient has received 1 units of PRBCs on 11/4  - Patient's anemia is currently stable  - pt requests decreased labs, so am labs discontinued.  Will check cbc prn

## 2024-11-16 NOTE — PROGRESS NOTES
Pulmonary/Critical Care Progress Note      PATIENT NAME: Alexa Otero  MRN: [id number]  TODAY'S DATE: 2024  11:55 AM  ADMIT DATE: 2024  AGE: 84 y.o. 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    She reports progressive fatigue weakness prior to admission to the hospital.  She reports that she slid out of her bed and that is the reason she came to the hospital.  She reports that she has not had any fever cough night sweats, but she has some worsening shortness of breath.    11/3   - rough night, had worsening shortness of breath and is required non-rebreather in addition no nasal cannula currently she is on high-flow at 10 liters/minute     the patient is feeling a little better today.  She is on 15 L high-flow nasal cannula.  Her CT shows her chronically infiltrated right lung with the surrounding effusion.  Her left lung which was normal looking 2 weeks ago now is diffusely infiltrated with ground-glass opacities.  This must be the Alimta.     the patient is improving with the steroids.  She looks much brighter today.  She is feeling better.  She is on 10 L high-flow nasal cannula.     the patient is very sleepy today.  The nurse reports she was up earlier and ate breakfast and sat up.  Her oxygen is set on 10 L.  Her sats remain 99% on 5 L. I have asked the nurse to try to wean this.     the patient is looking good today.  She is down to 3 L nasal cannula.  Her chest x-ray is improving but her left thorax is not yet normal. We need to get her priest out and her walking.     the patient is too weak  to participate with PT she says.  She also states that it makes her very short of breath.  Her saturations are difficult to .  Urology would like to leave the Rausch in.  Obviously, the patient is going to need to go to a nursing home from here.  She states she will be here over the weekend.    11/9 the patient states physical therapy has not yet come to see her today.  States she is feeling good from the waist up.  She is on 4 L nasal cannula.    11/10 the patient was not seen by physical therapy yesterday.  This is a shame.  She is concerned about still having a Rausch catheter.  Explained that Dr. Jon isn't coming to the hospital to see her, but she needs to get to him to be evaluated. The patient's oxygen requirements are higher again today.  Will increase her prednisone to 60 mg and see if we can not stabilize this.      11/11 .  The patient's breathing is not as good.  She is dyspneic with speech.  Will resume Solu-Medrol.  Have added Bactrim DS q.o.d. for PCP prevention.    11/12 The patient is now requiring nearly maximum vapotherm to oxygenate.  Her CXR is not appreciably changed.  Have pushed her back to 60 q 6 of solumedrol. Discussed with Dr. Cassidy the utility of bronchoscopy.  Given her compromised state she would need to be intubated and I don't know if I could get her extubated. Unfortunately, all we have to offer is steroids and will see if she turns around again.     11/13 the patient is on 30 L and 85% Vapotherm.  Looks very fatigued.  She had multiple questions after being visited by Sudha from palliative care.  Obviously, the patient can not go home.  We have no way to get enough oxygen to her.  I think that if she is not better by tomorrow we should choose hospice and then the 2 choices will be to try to get her to an LTAC with hospice or to do inpatient hospice here and withdraw care.  Discussed the patient, her , and her daughter.    11/14 the patient is still requiring  maximum Vapotherm to oxygenate.  She desaturates severely when she eats.  She has decided to go to hospice next Monday or Tuesday after her friends and family have visited.  We can certainly move the patient to a medical-surgical unit.    11/15/2024 - Stable overnight still on vapotherm and desats easily.  No distress and we discussed her goals and she wants to focus on comfort.    11/16/2024 - Stable overnight, had good night and no new complaints.  No new needs at this time.  She is waiting for a few other family members to arrive before she decides on transition fully to comfort measures but she wants the focus of her care to be comfort and would like less blood work, etc.    REVIEW OF SYSTEMS  General: Feeling weak  Eyes: Vision is good.  ENT:  No sinusitis or pharyngitis.   Heart: No chest pain or palpitations.  Lungs:  Feeling more short of breath.  GI: No Nausea, vomiting, constipation, diarrhea, or reflux.  : No dysuria, hesitancy, or nocturia.  Skin: No lesions or rashes.  Musculoskeletal:  Very weak..  Neuro: No headaches or neuropathy.  Lymph: No edema or adenopathy.  Psych: No anxiety or depression.  Endo: No weight change.        No change in the patient's Past Medical History, Past Surgical History, Social History or Family History since admission.    VITAL SIGNS (MOST RECENT)  Temp: 97.7 °F (36.5 °C) (11/16/24 0805)  Pulse: 75 (11/16/24 1120)  Resp: 18 (11/16/24 1120)  BP: 130/63 (11/16/24 0805)  SpO2: (!) 88 % (11/16/24 1120)    INTAKE AND OUTPUT (LAST 24 HOURS):  Intake/Output Summary (Last 24 hours) at 11/16/2024 1137  Last data filed at 11/16/2024 0903  Gross per 24 hour   Intake 1480 ml   Output 1375 ml   Net 105 ml       WEIGHT  Wt Readings from Last 1 Encounters:   11/01/24 49.5 kg (109 lb 2 oz)         PHYSICAL EXAM  GENERAL: Older patient in no distress, looking frail.  HEENT: Pupils equal and reactive. Extraocular movements intact. Nose intact.  Pharynx moist.  NECK: Supple.   HEART:  "Regular rate and rhythm. No murmur or gallop auscultated.  LUNGS:  There are diminished breath sounds on the right.  On the left there are diffuse crackles anteriorly and posteriorly. Lung excursion symmetrical. No change in fremitus.  There is a port in thorax which is accessed.  ABDOMEN: Bowel sounds present. Non-tender, no masses palpated.  : Normal anatomy. Rausch with clear urine.  EXTREMITIES: Normal muscle tone and joint movement, no cyanosis or clubbing.  She is very weak.  LYMPHATICS: No adenopathy palpated, no edema.  SKIN: Dry, intact, no lesions.  There is a stage I decubitus on the buttock.    NEURO: Cranial nerves II-XII intact. Motor strength 5/5 bilaterally, upper and lower extremities.  PSYCH: Appropriate affect.        CBC LAST (LAST 24 HOURS)  No results for input(s): "WBC", "RBC", "HGB", "HCT", "MCV", "MCH", "MCHC", "RDW", "PLT", "MPV", "GRAN", "LYMPH", "MONO", "BASO", "NRBC" in the last 24 hours.      CHEMISTRY LAST (LAST 24 HOURS)  No results for input(s): "NA", "K", "CL", "CO2", "ANIONGAP", "BUN", "CREATININE", "GLU", "CALCIUM", "PH", "MG", "ALBUMIN", "PROT", "ALKPHOS", "ALT", "AST", "BILITOT" in the last 24 hours.        LAST 7 DAYS MICROBIOLOGY   Microbiology Results (last 7 days)       ** No results found for the last 168 hours. **            MOST RECENT IMAGING  X-Ray Chest AP Portable  Narrative: EXAMINATION:  XR CHEST AP PORTABLE    CLINICAL HISTORY:  pneumonitis;    FINDINGS:  Portable chest radiograph at 06:00 hours compared to numerous prior exams including 11/10/2024 shows no significant interval change.  Impression: No significant interval change.    Electronically signed by: Davide Jeffrey  Date:    11/11/2024  Time:    09:04      CURRENT VISIT EKG  Results for orders placed or performed during the hospital encounter of 11/01/24   EKG 12-lead   Result Value Ref Range    QRS Duration 56 ms    [facility] QTC Calculation 482 ms    Narrative    Test Reason : R06.02,    Vent. Rate : 097 BPM     " Atrial Rate : 097 BPM     P-R Int : 156 ms          QRS Dur : 056 ms      QT Int : 380 ms       P-R-T Axes : 044 -06 100 degrees     QTc Int : 482 ms    Normal sinus rhythm  Low voltage QRS  Septal infarct (cited on or before 13-OCT-2023)  Abnormal ECG  When compared with ECG of 25-OCT-2024 10:48,  ST now depressed in Lateral leads    Referred By: [email or web address]   SELF           Confirmed By:        ECHOCARDIOGRAM RESULTS  Results for orders placed during the hospital encounter of 10/07/24    Echo    Interpretation Summary    Left Ventricle: The left ventricle is normal in size. Mildly increased wall thickness. There is mild concentric hypertrophy. Moderate global hypokinesis present. There is mildly reduced systolic function with a visually estimated ejection fraction of 40 - 45%. There is normal diastolic function.    Right Ventricle: Normal right ventricular cavity size. Wall thickness is normal. Systolic function is normal.    Left Atrium: Left atrium is moderately dilated.    Mitral Valve: There is bileaflet sclerosis. There is moderate mitral annular calcification present. There is moderate stenosis. The mean pressure gradient across the mitral valve is 8 mmHg at a heart rate of  bpm. There is mild regurgitation with a centrally directed jet.    Pulmonary Artery: The estimated pulmonary artery systolic pressure is 29 mmHg.    IVC/SVC: Normal venous pressure at 3 mmHg.        Oxygen INFORMATION  Oxygen Concentration (%):  [100] 100   Vapotherms 35l/min and 90%           PLAN:.      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  Her right lung is basically nonfunctional.  This is either her cancer or her Alimta.  Bronching her would leave her stuck on a ventilator.  The patient has decided to do hospice. She will wait until Monday or Tuesday after she has had time to visit with family and friends.  Suggest moving to med surg floor.      Virgil Padilla MD  [facility] Pulmonary/Critical Care  11/16/2024

## 2024-11-16 NOTE — PROGRESS NOTES
Critical access hospital Medicine  Progress Note    Patient Name: Alexa Otero  MRN: [id number]  Patient Class: IP- Inpatient   Admission Date: 11/1/2024  Length of Stay: 15 days  Attending Physician: Idalia Strong MD  Primary Care Provider: Idalia Greco MD        Subjective:     Principal Problem:Acute on chronic hypoxic respiratory failure        HPI:  84-year-old female with a past medical history of lung cancer actively getting chemo last of which was 5 days ago, who presented to the ER because of generalized weakness.  According to the patient, she woke up today, tried to get out of bed, but felt very weak, and slid beside the bed.  Did not hit her head, and did not lose consciousness.  She however could not get up however, and her  could not assist.  911 was called.  On arrival of EMS, patient was satting 88% on 4 L, and appeared to be short of breath.  She was brought to the ER for evaluation.    In the ER, vitals showed a blood pressure of 145/65, heart rate of 103, respiratory rate of 20, afebrile satting 60% on 4 L.  Immediately patient was placed on non-rebreather.  CBC with white count of 16.8, and macrocytic anemia.  CMP showed hypokalemia of 3.4.  First troponin is elevated at 46.  Point of care Lactic acid was 2.1.  Blood cultures were collected.  EKG showed sinus rhythm.  Chest x-ray showed no significant change of bilateral diffuse mixed interstitial and airspace lung opacities.  CTA chest was done, and it was negative for PE, but showed extensive interstitial and airspace opacities throughout both lungs, having significantly worsened in the left lung compared to prior CT. Unchanged small to moderate sized bilateral pleural effusions.  Cefepime was ordered, and patient will be admitted to Medicine for further care of her severe sepsis.    Overview/Hospital Course:  Ms. Otero has been monitored closely during her hospitalization. She was admitted on 11/1/2024 with acute on  chronic hypoxemic resp failure. CTA chest reveals extensive interstitial opacities c/w multifocal pna and/or ARDS. She requires high flow nasal cannula up to 15L now on 13L. Pulm and ID have been consulted. Pulm recommends broad spectrum abx - cefepime, doxy, and vanc initially. MRSA screen negative and vanc d/c'ed. She has stage IV lung ca and completed Carbo/Pemetrexed last year and is currently on pemetrexed (Alimta) maintenance with last dose 10/28. This can cause an interstitial pneumonitis and pulm has started prednisone. Plan for bronchoscopy on Monday if no improvement and recommended continuing eliquis. BiPAP qHS and naps. Duonebs Q6h. ID has ordered a respiratory infection panel that is negative. She's had abdominal bloating and discomfort for some time and had an outpt CT abd/pelvis with gastric wall thickening that is is nonspecific could be related to gastritis. She has a history of peptic ulcers and PCP started protonix/carafate. KUB on 11/2 with nonobstructive bowel gas pattern and moderate stool burden and pt was treated with laxative suppository. She has chronic macrocytic anemia that appears at baseline. She had a leukocytosis with left shift on admission that has trended down from 16K-->12K-->8K. CRP 36, procal 2.9. Pt had a BM on 11/2, but abd remained distended. She was found to be retaining a significant amount of urine on bladder scan >900 and priest was placed with 1500mL out with resolution of abd distention. Pulm increased steriods on 11/3 to IV solumedrol, she's requiring BiPAP support and was given IV lasix. Macrocytic anemia at baseline on admission but trending down and she will receive 1 unit PRBCs 11/4. She has developed steroid induced hyperglycemia and insulin sliding titrated up to moderate dose for tighter glucose control. Priest d/c'ed on 11/5 and had to be reinserted on 11/06. PT/OT consulted and pt up to chair on 11/5. She desatted with movement and took some time and increased  supplemental O2 to recover. Prior to moving to the chair, O2 was weaned down to 10L high flow. 11/6/2024 pulmonology change patient to p.o. prednisone as her oxygen demand had improved.  Patient was being maintained on approximately 5 L of oxygen.  11/11/24 patient was working with physical therapy when she became hypoxic requiring Vapotherm.  At this time she is currently requiring max dose of Vapotherm.  Bronchoscopy is not suggested by pulmonology due to patient's fragile condition.  Patient was switched back to IV Solu-Medrol q.6 I was per pulmonology.  Long discussion was had with the patient concerning inpatient hospice versus comfort measures, patient and spouse would like comfort measures but not until the patient saw her family.  She was seen and evaluated by pulmonology as well as palliative Medicine she requested to remain her present room until her family was present in the action was made to transfer to comfort care.  Requested less lab work, so daily labs were discontinued.    Interval History: no acute events overnight.  Says she is feeling pretty good today and had a good night's sleep. Blood glucose elevated likely 2/2 steroids.  Will continue prn aspart.  Considered long acting insulin but would prefer not to risk hypoglycemia as long as she is not having symptoms r/t hyperglycemia.  D/w patient, family and bedside nurse who are in agreement.    Review of Systems   Respiratory:  Positive for shortness of breath.    Psychiatric/Behavioral:  Negative for agitation and confusion.      Objective:     Vital Signs (Most Recent):  Temp: 97.7 °F (36.5 °C) (11/16/24 0805)  Pulse: 76 (11/16/24 1139)  Resp: 18 (11/16/24 1120)  BP: 126/65 (11/16/24 1139)  SpO2: 96 % (11/16/24 1139) Vital Signs (24h Range):  Temp:  [97.4 °F (36.3 °C)-98.5 °F (36.9 °C)] 97.7 °F (36.5 °C)  Pulse:  [64-76] 76  Resp:  [17-24] 18  SpO2:  [87 %-100 %] 96 %  BP: (115-134)/(60-65) 126/65     Weight: 49.5 kg (109 lb 2 oz)  Body mass  index is 18.73 kg/m².    Intake/Output Summary (Last 24 hours) at 11/16/2024 1226  Last data filed at 11/16/2024 0903  Gross per 24 hour   Intake 1480 ml   Output 1375 ml   Net 105 ml         Physical Exam  Constitutional:       Appearance: Normal appearance.   HENT:      Head: Normocephalic.      Right Ear: Tympanic membrane normal.      Left Ear: Tympanic membrane normal.      Nose: Nose normal. No congestion or rhinorrhea.      Mouth/Throat:      Mouth: Mucous membranes are dry.      Pharynx: Oropharynx is clear. No oropharyngeal exudate or posterior oropharyngeal erythema.      Comments: Mucous membranes are tacky  Eyes:      Pupils: Pupils are equal, round, and reactive to light.   Cardiovascular:      Rate and Rhythm: Normal rate and regular rhythm.   Pulmonary:      Effort: Pulmonary effort is normal.      Breath sounds: Rhonchi present. No wheezing or rales.      Comments: Decreased aeration  Abdominal:      General: Abdomen is flat. There is no distension.      Palpations: Abdomen is soft.   Musculoskeletal:         General: Normal range of motion.      Cervical back: Normal range of motion.   Skin:     General: Skin is warm and dry.   Neurological:      General: No focal deficit present.      Mental Status: She is alert.   Psychiatric:         Mood and Affect: Mood normal.             Significant Labs: All pertinent labs within the past 24 hours have been reviewed.  CBC:   Recent Labs   Lab 11/15/24  0448   WBC 15.42*   HGB 7.6*   HCT 23.9*        CMP:   Recent Labs   Lab 11/15/24  0448      K 5.2*   CL 99   CO2 36*   *   BUN 50*   CREATININE 1.4   CALCIUM 7.9*   PROT 4.5*   ALBUMIN 2.3*   BILITOT 0.3   ALKPHOS 43*   AST 12   ALT 30   ANIONGAP 2*       Significant Imaging: I have reviewed all pertinent imaging results/findings within the past 24 hours.    Assessment/Plan:      * Acute on chronic hypoxic respiratory failure  Likely secondary to her bilateral extensive pneumonia vs  pneumonitis  On 4L NC at home  Currently on high flow nasal cannula and intermittent bipap  Pulm consulted   CTA chest ruled out PE.  Echo done last month showed normal systolic function.   Currently on high flow 35%  P.o. steroids changed to Solu-Medrol  Bactrim to prevent PCP  Respiratory status is not improving.  Palliative care consulted.      Goals of care, counseling/discussion  Advance Care Planning    See palliative care note    Urinary retention  Constipation likely contributing  She had a couple of Bms and started bowel regimen  Priest placed for significant retention   D/C priest 11/5 and had to be reinserted on 11/6  Flomax initiated  Urology consulted:  Recommended follow up outpatient.  Keep Priest in place for 5-7 days before voiding trial.  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 Most recent hemoglobin and hematocrit are listed below.  Recent Labs     11/14/24  0553 11/15/24  0448   HGB 7.3* 7.6*   HCT 23.3* 23.9*       Plan  - Monitor serial CBC: Daily  - Transfuse PRBC if patient becomes hemodynamically unstable, symptomatic or H/H drops below 7/21.  - Patient has received 1 units of PRBCs on 11/4  - Patient's anemia is currently stable  - pt requests decreased labs, so am labs discontinued.  Will check cbc prn    Pneumonitis  Patient was started on cefepime, doxy for atypicals, and Vanc, vanc d/c'ed with negative MRSA screen  Pulmonary and Infectious Disease following  Steroids increased by pulmonology   Antibiotic therapy complete    Antibiotics (From admission, onward)      Start     Stop Route Frequency Ordered    11/12/24 0900  sulfamethoxazole-trimethoprim 800-160mg per tablet 1 tablet         -- Oral Every other day 11/11/24 0839    11/01/24 1300  mupirocin 2 % ointment         11/06/24 0859 Nasl 2 times daily 11/01/24 1200            Microbiology Results (last 7 days)       ** No results found for the last 168 hours. **             Pleural effusion  Bilateral, stable.   Pulm consulted  In the setting of lung ca      Malignant neoplasm of lower lobe of right lung  Carbo/Pemetrexed until 4/2024 now on Pemetrexed maintenance with last dose 10/28/24  Consult pt's oncologist, Dr. Cassidy following      Atrial fibrillation  Patient has paroxysmal (<7 days) atrial fibrillation. Patient is currently in sinus rhythm. [id number] Score: 5. The patients heart rate in the last 24 hours is as follows:  Pulse  Min: 68  Max: 81     Antiarrhythmics  Amiodarone and metoprolol    Anticoagulants  Eliquis    Plan  - Replete lytes with a goal of K>4, Mg >2  - Patient is anticoagulated, see medications listed above.  - Patient's afib is currently controlled    ACP (advance care planning)  See palliative care consult  Patient and spouse would like comfort measures but not until their family arrives.      VTE Risk Mitigation (From admission, onward)           Ordered     apixaban tablet 2.5 mg  2 times daily         11/01/24 1316     IP VTE HIGH RISK PATIENT  Once         11/01/24 1158     Place sequential compression device  Until discontinued         11/01/24 1158                    Discharge Planning   ALYCIA:      Code Status: DNR   Is the patient medically ready for discharge?:     Reason for patient still in hospital (select all that apply): Patient trending condition, Treatment, Consult recommendations, and Pending disposition  Discharge Plan A: Comfort care/withdrawal   Discharge Delays: None known at this time              BRITTON Sylvester  Department of Hospital Medicine   [facility]

## 2024-11-17 NOTE — PROGRESS NOTES
Critical access hospital Medicine  Progress Note    Patient Name: Alexa Otero  MRN: [id number]  Patient Class: IP- Inpatient   Admission Date: 11/1/2024  Length of Stay: 16 days  Attending Physician: Idalia Strong MD  Primary Care Provider: Idalia Greco MD        Subjective:     Principal Problem:Acute on chronic hypoxic respiratory failure        HPI:  84-year-old female with a past medical history of lung cancer actively getting chemo last of which was 5 days ago, who presented to the ER because of generalized weakness.  According to the patient, she woke up today, tried to get out of bed, but felt very weak, and slid beside the bed.  Did not hit her head, and did not lose consciousness.  She however could not get up however, and her  could not assist.  911 was called.  On arrival of EMS, patient was satting 88% on 4 L, and appeared to be short of breath.  She was brought to the ER for evaluation.    In the ER, vitals showed a blood pressure of 145/65, heart rate of 103, respiratory rate of 20, afebrile satting 60% on 4 L.  Immediately patient was placed on non-rebreather.  CBC with white count of 16.8, and macrocytic anemia.  CMP showed hypokalemia of 3.4.  First troponin is elevated at 46.  Point of care Lactic acid was 2.1.  Blood cultures were collected.  EKG showed sinus rhythm.  Chest x-ray showed no significant change of bilateral diffuse mixed interstitial and airspace lung opacities.  CTA chest was done, and it was negative for PE, but showed extensive interstitial and airspace opacities throughout both lungs, having significantly worsened in the left lung compared to prior CT. Unchanged small to moderate sized bilateral pleural effusions.  Cefepime was ordered, and patient will be admitted to Medicine for further care of her severe sepsis.    Overview/Hospital Course:  Ms. Otero has been monitored closely during her hospitalization. She was admitted on 11/1/2024 with acute on  chronic hypoxemic resp failure. CTA chest reveals extensive interstitial opacities c/w multifocal pna and/or ARDS. She requires high flow nasal cannula up to 15L now on 13L. Pulm and ID have been consulted. Pulm recommends broad spectrum abx - cefepime, doxy, and vanc initially. MRSA screen negative and vanc d/c'ed. She has stage IV lung ca and completed Carbo/Pemetrexed last year and is currently on pemetrexed (Alimta) maintenance with last dose 10/28. This can cause an interstitial pneumonitis and pulm has started prednisone. Plan for bronchoscopy on Monday if no improvement and recommended continuing eliquis. BiPAP qHS and naps. Duonebs Q6h. ID has ordered a respiratory infection panel that is negative. She's had abdominal bloating and discomfort for some time and had an outpt CT abd/pelvis with gastric wall thickening that is is nonspecific could be related to gastritis. She has a history of peptic ulcers and PCP started protonix/carafate. KUB on 11/2 with nonobstructive bowel gas pattern and moderate stool burden and pt was treated with laxative suppository. She has chronic macrocytic anemia that appears at baseline. She had a leukocytosis with left shift on admission that has trended down from 16K-->12K-->8K. CRP 36, procal 2.9. Pt had a BM on 11/2, but abd remained distended. She was found to be retaining a significant amount of urine on bladder scan >900 and priest was placed with 1500mL out with resolution of abd distention. Pulm increased steriods on 11/3 to IV solumedrol, she's requiring BiPAP support and was given IV lasix. Macrocytic anemia at baseline on admission but trending down and she will receive 1 unit PRBCs 11/4. She has developed steroid induced hyperglycemia and insulin sliding titrated up to moderate dose for tighter glucose control. Priest d/c'ed on 11/5 and had to be reinserted on 11/06. PT/OT consulted and pt up to chair on 11/5. She desatted with movement and took some time and increased  supplemental O2 to recover. Prior to moving to the chair, O2 was weaned down to 10L high flow. 11/6/2024 pulmonology change patient to p.o. prednisone as her oxygen demand had improved.  Patient was being maintained on approximately 5 L of oxygen.  11/11/24 patient was working with physical therapy when she became hypoxic requiring Vapotherm.  At this time she is currently requiring max dose of Vapotherm.  Bronchoscopy is not suggested by pulmonology due to patient's fragile condition.  Patient was switched back to IV Solu-Medrol q.6 I was per pulmonology.  Long discussion was had with the patient concerning inpatient hospice versus comfort measures, patient and spouse would like comfort measures but not until the patient saw her family.  She was seen and evaluated by pulmonology as well as palliative Medicine she requested to remain her present room until her family was present in the action was made to transfer to comfort care.  Requested less lab work, so daily labs were discontinued.    Interval History: Pateint evaluated in bed with no acute distress noted.  She is AAO, answers all question appropriately.  She continues to require vapotherm but reports she feels good.  Continues with some shortness of breath.  Denies chest pain.  Denies any needs.  Reports good appetite.  Family at bedside, POC discussed with patient and family.  Discharge planning discussed - likely Palliative care vs hospice on Wednesday.  CM/SS following for discharge planning.     Review of Systems   Respiratory:  Positive for shortness of breath.    Psychiatric/Behavioral:  Negative for agitation and confusion.      Objective:     Vital Signs (Most Recent):  Temp: 97 °F (36.1 °C) (11/17/24 1132)  Pulse: 73 (11/17/24 1132)  Resp: 18 (11/17/24 1132)  BP: 134/64 (11/17/24 1132)  SpO2: 98 % (11/17/24 1132) Vital Signs (24h Range):  Temp:  [96.6 °F (35.9 °C)-97.4 °F (36.3 °C)] 97 °F (36.1 °C)  Pulse:  [68-89] 73  Resp:  [15-22] 18  SpO2:  [90  "%-100 %] 98 %  BP: (134-186)/(64-83) 134/64     Weight: 49.5 kg (109 lb 2 oz)  Body mass index is 18.73 kg/m².    Intake/Output Summary (Last 24 hours) at 11/17/2024 1220  Last data filed at 11/17/2024 1212  Gross per 24 hour   Intake 1430 ml   Output 1400 ml   Net 30 ml         Physical Exam  Constitutional:       Appearance: Normal appearance.   HENT:      Head: Normocephalic.      Right Ear: Tympanic membrane normal.      Left Ear: Tympanic membrane normal.      Nose: Nose normal. No congestion or rhinorrhea.      Mouth/Throat:      Mouth: Mucous membranes are dry.      Pharynx: Oropharynx is clear. No oropharyngeal exudate or posterior oropharyngeal erythema.   Eyes:      Pupils: Pupils are equal, round, and reactive to light.   Cardiovascular:      Rate and Rhythm: Normal rate and regular rhythm.   Pulmonary:      Effort: Pulmonary effort is normal.      Breath sounds: Rhonchi present. No wheezing or rales.      Comments: Decreased aeration  Abdominal:      General: Abdomen is flat. There is no distension.      Palpations: Abdomen is soft.   Musculoskeletal:         General: Normal range of motion.      Cervical back: Normal range of motion.   Skin:     General: Skin is warm and dry.   Neurological:      General: No focal deficit present.      Mental Status: She is alert.   Psychiatric:         Mood and Affect: Mood normal.             Significant Labs: All pertinent labs within the past 24 hours have been reviewed.  CBC: No results for input(s): "WBC", "HGB", "HCT", "PLT" in the last 48 hours.  CMP: No results for input(s): "NA", "K", "CL", "CO2", "GLU", "BUN", "CREATININE", "CALCIUM", "PROT", "ALBUMIN", "BILITOT", "ALKPHOS", "AST", "ALT", "ANIONGAP", "EGFRNONAA" in the last 48 hours.    Invalid input(s): "ESTGFAFRICA"    Significant Imaging: I have reviewed all pertinent imaging results/findings within the past 24 hours.    Assessment/Plan:      * Acute on chronic hypoxic respiratory failure  Likely secondary " to her bilateral extensive pneumonia vs pneumonitis  On 4L NC at home  Currently on high flow nasal cannula and intermittent bipap  Pulm consulted   CTA chest ruled out PE.  Echo done last month showed normal systolic function.   Currently on high flow 35%  P.o. steroids changed to Solu-Medrol  Bactrim to prevent PCP  Respiratory status is not improving.  Palliative care consulted.      Goals of care, counseling/discussion  Advance Care Planning    See palliative care note    Urinary retention  Constipation likely contributing  She had a couple of Bms and started bowel regimen  Priest placed for significant retention   D/C priest 11/5 and had to be reinserted on 11/6  Flomax initiated  Urology consulted:  Recommended follow up outpatient.  Keep Priest in place for 5-7 days before voiding trial.  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 Most recent hemoglobin and hematocrit are listed below.  Recent Labs     11/14/24  0553 11/15/24  0448   HGB 7.3* 7.6*   HCT 23.3* 23.9*       Plan  - Monitor serial CBC: Daily  - Transfuse PRBC if patient becomes hemodynamically unstable, symptomatic or H/H drops below 7/21.  - Patient has received 1 units of PRBCs on 11/4  - Patient's anemia is currently stable  - pt requests decreased labs, so am labs discontinued.  Will check cbc prn    Pneumonitis  Patient was started on cefepime, doxy for atypicals, and Vanc, vanc d/c'ed with negative MRSA screen  Pulmonary and Infectious Disease following  Steroids increased by pulmonology   Antibiotic therapy complete    Antibiotics (From admission, onward)      Start     Stop Route Frequency Ordered    11/12/24 0900  sulfamethoxazole-trimethoprim 800-160mg per tablet 1 tablet         -- Oral Every other day 11/11/24 0839    11/01/24 1300  mupirocin 2 % ointment         11/06/24 0859 Nasl 2 times daily 11/01/24 1200            Microbiology Results (last 7 days)       ** No results found for  the last 168 hours. **            Pleural effusion  Bilateral, stable.   Pulm consulted  In the setting of lung ca      Malignant neoplasm of lower lobe of right lung  Carbo/Pemetrexed until 4/2024 now on Pemetrexed maintenance with last dose 10/28/24  Consult pt's oncologist, Dr. Cassidy following      Atrial fibrillation  Patient has paroxysmal (<7 days) atrial fibrillation. Patient is currently in sinus rhythm. [id number] Score: 5. The patients heart rate in the last 24 hours is as follows:  Pulse  Min: 68  Max: 81     Antiarrhythmics  Amiodarone and metoprolol    Anticoagulants  Eliquis    Plan  - Replete lytes with a goal of K>4, Mg >2  - Patient is anticoagulated, see medications listed above.  - Patient's afib is currently controlled    ACP (advance care planning)  See palliative care consult  Patient and spouse would like comfort measures but not until their family arrives.      VTE Risk Mitigation (From admission, onward)           Ordered     apixaban tablet 2.5 mg  2 times daily         11/01/24 1316     IP VTE HIGH RISK PATIENT  Once         11/01/24 1158     Place sequential compression device  Until discontinued         11/01/24 1158                    Discharge Planning   ALYCIA:      Code Status: DNR   Is the patient medically ready for discharge?:     Reason for patient still in hospital (select all that apply): Treatment  Discharge Plan A: Comfort care/withdrawal   Discharge Delays: None known at this time              Alyx Robles NP  Department of Hospital Medicine   [facility]

## 2024-11-17 NOTE — CARE UPDATE
11/16/24 1924   Patient Assessment/Suction   Level of Consciousness (AVPU) alert   Respiratory Effort Normal;Unlabored   Expansion/Accessory Muscles/Retractions no use of accessory muscles;no retractions   All Lung Fields Breath Sounds clear;diminished   Rhythm/Pattern, Respiratory unlabored;pattern regular;no shortness of breath reported   Cough Frequency no cough   Skin Integrity   $ Wound Care Tech Time 15 min   Area Observed Left;Right;Behind ear;Cheek;Nares   Skin Appearance without discoloration   PRE-TX-O2   Humidification temp set 33   Humidification temp actual 33   Flow (L/min) (Oxygen Therapy) 40   Oxygen Concentration (%) 100   SpO2 100 %   Pulse Oximetry Type Continuous   Pulse 76   Resp 18   Aerosol Therapy   $ Aerosol Therapy Charges Aerosol Treatment  (duo)   Daily Review of Necessity (SVN) completed   Respiratory Treatment Status (SVN) given   Treatment Route (SVN) in-line   Patient Position HOB elevated   Post Treatment Assessment (SVN) increased aeration   Signs of Intolerance (SVN) none   Breath Sounds Post-Respiratory Treatment   Throughout All Fields Post-Treatment All Fields   Throughout All Fields Post-Treatment aeration increased   Post-treatment Heart Rate (beats/min) 76   Post-treatment Resp Rate (breaths/min) 18   Incentive Spirometer   $ Incentive Spirometer Charges proper technique demonstrated   Incentive Spirometer Predicted Level (mL) 1700   Administration (IS) proper technique demonstrated   Number of Repetitions (IS) 10   Level Incentive Spirometer (mL) 250   Patient Tolerance (IS) no adverse signs/symptoms present   Ready to Wean/Extubation Screen   FIO2<=50 (chart decimal) (!) 1

## 2024-11-17 NOTE — PLAN OF CARE
Problem: Adult Inpatient Plan of Care  Goal: Plan of Care Review  Outcome: Progressing  Goal: Patient-Specific Goal (Individualized)  Outcome: Progressing  Goal: Absence of Hospital-Acquired Illness or Injury  Outcome: Progressing  Goal: Optimal Comfort and Wellbeing  Outcome: Progressing  Goal: Readiness for Transition of Care  Outcome: Progressing     Problem: Sepsis/Septic Shock  Goal: Optimal Coping  Outcome: Progressing  Goal: Absence of Bleeding  Outcome: Progressing  Goal: Blood Glucose Level Within Targeted Range  Outcome: Progressing  Goal: Absence of Infection Signs and Symptoms  Outcome: Progressing  Goal: Optimal Nutrition Intake  Outcome: Progressing     Problem: Pneumonia  Goal: Fluid Balance  Outcome: Progressing  Goal: Resolution of Infection Signs and Symptoms  Outcome: Progressing  Goal: Effective Oxygenation and Ventilation  Outcome: Progressing     Problem: Fall Injury Risk  Goal: Absence of Fall and Fall-Related Injury  Outcome: Progressing     Problem: Skin Injury Risk Increased  Goal: Skin Health and Integrity  Outcome: Progressing     Problem: Infection  Goal: Absence of Infection Signs and Symptoms  Outcome: Progressing     Problem: Gas Exchange Impaired  Goal: Optimal Gas Exchange  Outcome: Progressing     Problem: Wound  Goal: Optimal Coping  Outcome: Progressing  Goal: Optimal Functional Ability  Outcome: Progressing  Goal: Absence of Infection Signs and Symptoms  Outcome: Progressing  Goal: Improved Oral Intake  Outcome: Progressing  Goal: Optimal Pain Control and Function  Outcome: Progressing  Goal: Skin Health and Integrity  Outcome: Progressing  Goal: Optimal Wound Healing  Outcome: Progressing     Problem: Malnutrition  Goal: Improved Nutritional Intake  Outcome: Progressing     Problem: Coping Ineffective  Goal: Effective Coping  Outcome: Progressing     Problem: End-of-Life Care  Goal: Comfort, Peace and Preserved Dignity  Outcome: Progressing

## 2024-11-17 NOTE — SUBJECTIVE & OBJECTIVE
Interval History: Pateint evaluated in bed with no acute distress noted.  She is AAO, answers all question appropriately.  She continues to require vapotherm but reports she feels good.  Continues with some shortness of breath.  Denies chest pain.  Denies any needs.  Reports good appetite.  Family at bedside, POC discussed with patient and family.  Discharge planning discussed - likely Palliative care vs hospice on Wednesday.  CM/SS following for discharge planning.     Review of Systems   Respiratory:  Positive for shortness of breath.    Psychiatric/Behavioral:  Negative for agitation and confusion.      Objective:     Vital Signs (Most Recent):  Temp: 97 °F (36.1 °C) (11/17/24 1132)  Pulse: 73 (11/17/24 1132)  Resp: 18 (11/17/24 1132)  BP: 134/64 (11/17/24 1132)  SpO2: 98 % (11/17/24 1132) Vital Signs (24h Range):  Temp:  [96.6 °F (35.9 °C)-97.4 °F (36.3 °C)] 97 °F (36.1 °C)  Pulse:  [68-89] 73  Resp:  [15-22] 18  SpO2:  [90 %-100 %] 98 %  BP: (134-186)/(64-83) 134/64     Weight: 49.5 kg (109 lb 2 oz)  Body mass index is 18.73 kg/m².    Intake/Output Summary (Last 24 hours) at 11/17/2024 1220  Last data filed at 11/17/2024 1212  Gross per 24 hour   Intake 1430 ml   Output 1400 ml   Net 30 ml         Physical Exam  Constitutional:       Appearance: Normal appearance.   HENT:      Head: Normocephalic.      Right Ear: Tympanic membrane normal.      Left Ear: Tympanic membrane normal.      Nose: Nose normal. No congestion or rhinorrhea.      Mouth/Throat:      Mouth: Mucous membranes are dry.      Pharynx: Oropharynx is clear. No oropharyngeal exudate or posterior oropharyngeal erythema.   Eyes:      Pupils: Pupils are equal, round, and reactive to light.   Cardiovascular:      Rate and Rhythm: Normal rate and regular rhythm.   Pulmonary:      Effort: Pulmonary effort is normal.      Breath sounds: Rhonchi present. No wheezing or rales.      Comments: Decreased aeration  Abdominal:      General: Abdomen is flat.  "There is no distension.      Palpations: Abdomen is soft.   Musculoskeletal:         General: Normal range of motion.      Cervical back: Normal range of motion.   Skin:     General: Skin is warm and dry.   Neurological:      General: No focal deficit present.      Mental Status: She is alert.   Psychiatric:         Mood and Affect: Mood normal.             Significant Labs: All pertinent labs within the past 24 hours have been reviewed.  CBC: No results for input(s): "WBC", "HGB", "HCT", "PLT" in the last 48 hours.  CMP: No results for input(s): "NA", "K", "CL", "CO2", "GLU", "BUN", "CREATININE", "CALCIUM", "PROT", "ALBUMIN", "BILITOT", "ALKPHOS", "AST", "ALT", "ANIONGAP", "EGFRNONAA" in the last 48 hours.    Invalid input(s): "ESTGFAFRICA"    Significant Imaging: I have reviewed all pertinent imaging results/findings within the past 24 hours.  "

## 2024-11-17 NOTE — PROGRESS NOTES
Pulmonary/Critical Care Progress Note      PATIENT NAME: Alexa Otero  MRN: [id number]  TODAY'S DATE: 2024  11:55 AM  ADMIT DATE: 2024  AGE: 84 y.o. 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    She reports progressive fatigue weakness prior to admission to the hospital.  She reports that she slid out of her bed and that is the reason she came to the hospital.  She reports that she has not had any fever cough night sweats, but she has some worsening shortness of breath.    11/3   - rough night, had worsening shortness of breath and is required non-rebreather in addition no nasal cannula currently she is on high-flow at 10 liters/minute     the patient is feeling a little better today.  She is on 15 L high-flow nasal cannula.  Her CT shows her chronically infiltrated right lung with the surrounding effusion.  Her left lung which was normal looking 2 weeks ago now is diffusely infiltrated with ground-glass opacities.  This must be the Alimta.     the patient is improving with the steroids.  She looks much brighter today.  She is feeling better.  She is on 10 L high-flow nasal cannula.     the patient is very sleepy today.  The nurse reports she was up earlier and ate breakfast and sat up.  Her oxygen is set on 10 L.  Her sats remain 99% on 5 L. I have asked the nurse to try to wean this.     the patient is looking good today.  She is down to 3 L nasal cannula.  Her chest x-ray is improving but her left thorax is not yet normal. We need to get her priest out and her walking.     the patient is too weak  to participate with PT she says.  She also states that it makes her very short of breath.  Her saturations are difficult to .  Urology would like to leave the Rausch in.  Obviously, the patient is going to need to go to a nursing home from here.  She states she will be here over the weekend.    11/9 the patient states physical therapy has not yet come to see her today.  States she is feeling good from the waist up.  She is on 4 L nasal cannula.    11/10 the patient was not seen by physical therapy yesterday.  This is a shame.  She is concerned about still having a Rausch catheter.  Explained that Dr. Jon isn't coming to the hospital to see her, but she needs to get to him to be evaluated. The patient's oxygen requirements are higher again today.  Will increase her prednisone to 60 mg and see if we can not stabilize this.      11/11 .  The patient's breathing is not as good.  She is dyspneic with speech.  Will resume Solu-Medrol.  Have added Bactrim DS q.o.d. for PCP prevention.    11/12 The patient is now requiring nearly maximum vapotherm to oxygenate.  Her CXR is not appreciably changed.  Have pushed her back to 60 q 6 of solumedrol. Discussed with Dr. Cassidy the utility of bronchoscopy.  Given her compromised state she would need to be intubated and I don't know if I could get her extubated. Unfortunately, all we have to offer is steroids and will see if she turns around again.     11/13 the patient is on 30 L and 85% Vapotherm.  Looks very fatigued.  She had multiple questions after being visited by Sudha from palliative care.  Obviously, the patient can not go home.  We have no way to get enough oxygen to her.  I think that if she is not better by tomorrow we should choose hospice and then the 2 choices will be to try to get her to an LTAC with hospice or to do inpatient hospice here and withdraw care.  Discussed the patient, her , and her daughter.    11/14 the patient is still requiring  maximum Vapotherm to oxygenate.  She desaturates severely when she eats.  She has decided to go to hospice next Monday or Tuesday after her friends and family have visited.  We can certainly move the patient to a medical-surgical unit.    11/15/2024 - Stable overnight still on vapotherm and desats easily.  No distress and we discussed her goals and she wants to focus on comfort.    11/16/2024 - Stable overnight, had good night and no new complaints.  No new needs at this time.  She is waiting for a few other family members to arrive before she decides on transition fully to comfort measures but she wants the focus of her care to be comfort and would like less blood work, etc.    11/17/2024 - Stable overnight, no new issues reported.  No new needs reported.      REVIEW OF SYSTEMS  General: Feeling weak  Eyes: Vision is good.  ENT:  No sinusitis or pharyngitis.   Heart: No chest pain or palpitations.  Lungs:  Feeling more short of breath.  GI: No Nausea, vomiting, constipation, diarrhea, or reflux.  : No dysuria, hesitancy, or nocturia.  Skin: No lesions or rashes.  Musculoskeletal:  Very weak..  Neuro: No headaches or neuropathy.  Lymph: No edema or adenopathy.  Psych: No anxiety or depression.  Endo: No weight change.        No change in the patient's Past Medical History, Past Surgical History, Social History or Family History since admission.    VITAL SIGNS (MOST RECENT)  Temp: 96.6 °F (35.9 °C) (11/17/24 0444)  Pulse: 78 (11/17/24 0754)  Resp: 18 (11/17/24 0754)  BP: 136/73 (11/17/24 0754)  SpO2: (!) 93 % (11/17/24 0754)    INTAKE AND OUTPUT (LAST 24 HOURS):  Intake/Output Summary (Last 24 hours) at 11/17/2024 1058  Last data filed at 11/17/2024 0836  Gross per 24 hour   Intake 1430 ml   Output 1000 ml   Net 430 ml       WEIGHT  Wt Readings from Last 1 Encounters:   11/01/24 49.5 kg (109 lb 2 oz)         PHYSICAL EXAM  GENERAL: Older patient in no distress, looking frail.  HEENT: Pupils equal and reactive.  "Extraocular movements intact. Nose intact.  Pharynx moist.  NECK: Supple.   HEART: Regular rate and rhythm. No murmur or gallop auscultated.  LUNGS:  There are diminished breath sounds on the right.  On the left there are diffuse crackles anteriorly and posteriorly. Lung excursion symmetrical. No change in fremitus.  There is a port in thorax which is accessed.  ABDOMEN: Bowel sounds present. Non-tender, no masses palpated.  : Normal anatomy. Rausch with clear urine.  EXTREMITIES: Normal muscle tone and joint movement, no cyanosis or clubbing.  She is very weak.  LYMPHATICS: No adenopathy palpated, no edema.  SKIN: Dry, intact, no lesions.  There is a stage I decubitus on the buttock.    NEURO: Cranial nerves II-XII intact. Motor strength 5/5 bilaterally, upper and lower extremities.  PSYCH: Appropriate affect.        CBC LAST (LAST 24 HOURS)  No results for input(s): "WBC", "RBC", "HGB", "HCT", "MCV", "MCH", "MCHC", "RDW", "PLT", "MPV", "GRAN", "LYMPH", "MONO", "BASO", "NRBC" in the last 24 hours.      CHEMISTRY LAST (LAST 24 HOURS)  No results for input(s): "NA", "K", "CL", "CO2", "ANIONGAP", "BUN", "CREATININE", "GLU", "CALCIUM", "PH", "MG", "ALBUMIN", "PROT", "ALKPHOS", "ALT", "AST", "BILITOT" in the last 24 hours.        LAST 7 DAYS MICROBIOLOGY   Microbiology Results (last 7 days)       ** No results found for the last 168 hours. **            MOST RECENT IMAGING  X-Ray Chest AP Portable  Narrative: EXAMINATION:  XR CHEST AP PORTABLE    CLINICAL HISTORY:  pneumonitis;    FINDINGS:  Portable chest radiograph at 06:00 hours compared to numerous prior exams including 11/10/2024 shows no significant interval change.  Impression: No significant interval change.    Electronically signed by: Davide Jeffrey  Date:    11/11/2024  Time:    09:04      CURRENT VISIT EKG  Results for orders placed or performed during the hospital encounter of 11/01/24   EKG 12-lead   Result Value Ref Range    QRS Duration 56 ms    [facility] QTC " Calculation 482 ms    Narrative    Test Reason : R06.02,    Vent. Rate : 097 BPM     Atrial Rate : 097 BPM     P-R Int : 156 ms          QRS Dur : 056 ms      QT Int : 380 ms       P-R-T Axes : 044 -06 100 degrees     QTc Int : 482 ms    Normal sinus rhythm  Low voltage QRS  Septal infarct (cited on or before 13-OCT-2023)  Abnormal ECG  When compared with ECG of 25-OCT-2024 10:48,  ST now depressed in Lateral leads    Referred By: [email or web address]   SELF           Confirmed By:        ECHOCARDIOGRAM RESULTS  Results for orders placed during the hospital encounter of 10/07/24    Echo    Interpretation Summary    Left Ventricle: The left ventricle is normal in size. Mildly increased wall thickness. There is mild concentric hypertrophy. Moderate global hypokinesis present. There is mildly reduced systolic function with a visually estimated ejection fraction of 40 - 45%. There is normal diastolic function.    Right Ventricle: Normal right ventricular cavity size. Wall thickness is normal. Systolic function is normal.    Left Atrium: Left atrium is moderately dilated.    Mitral Valve: There is bileaflet sclerosis. There is moderate mitral annular calcification present. There is moderate stenosis. The mean pressure gradient across the mitral valve is 8 mmHg at a heart rate of  bpm. There is mild regurgitation with a centrally directed jet.    Pulmonary Artery: The estimated pulmonary artery systolic pressure is 29 mmHg.    IVC/SVC: Normal venous pressure at 3 mmHg.        Oxygen INFORMATION  Oxygen Concentration (%):  [100] 100   Vapotherms 35l/min and 90%           PLAN:.      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

## 2024-11-17 NOTE — CARE UPDATE
11/17/24 0638   Patient Assessment/Suction   Level of Consciousness (AVPU) responds to voice   Respiratory Effort Unlabored   Expansion/Accessory Muscles/Retractions no use of accessory muscles   All Lung Fields Breath Sounds clear;diminished   Rhythm/Pattern, Respiratory pattern regular;depth regular;unlabored   Cough Frequency no cough   PRE-TX-O2   Device (Oxygen Therapy)   (vapotherm)   $ Is the patient on High Flow Oxygen? Yes   $ Noninvasive Daily Charge Noninvasive Daily   Humidification temp set 33   Humidification temp actual 33   Flow (L/min) (Oxygen Therapy) 40   Oxygen Concentration (%) 100   SpO2 100 %   Pulse Oximetry Type Continuous   $ Pulse Oximetry - Multiple Charge Pulse Oximetry - Multiple   Pulse 68   Resp 18   Aerosol Therapy   $ Aerosol Therapy Charges Aerosol Treatment   Daily Review of Necessity (SVN) completed   Respiratory Treatment Status (SVN) given   Treatment Route (SVN) in-line   Patient Position semi-Morrell's   Post Treatment Assessment (SVN) increased aeration   Breath Sounds Post-Respiratory Treatment   Throughout All Fields Post-Treatment aeration increased   Post-treatment Heart Rate (beats/min) 67   Post-treatment Resp Rate (breaths/min) 18   Incentive Spirometer   $ Incentive Spirometer Charges proper technique demonstrated   Incentive Spirometer Predicted Level (mL) 1700   Administration (IS) proper technique demonstrated   Number of Repetitions (IS) 10   Level Incentive Spirometer (mL) 250   Patient Tolerance (IS) good   Ready to Wean/Extubation Screen   FIO2<=50 (chart decimal) (!) 1   Education   $ Education Bronchodilator;15 min   Respiratory Evaluation   $ Care Plan Tech Time 15 min

## 2024-11-18 PROBLEM — Z51.5 COMFORT MEASURES ONLY STATUS: Status: ACTIVE | Noted: 2024-01-01

## 2024-11-18 NOTE — ASSESSMENT & PLAN NOTE
See palliative care consult  Patient and spouse would like comfort measures but not until their family arrives.  Plan to transition to patient focused care.  Add morphine 2 mg IV every 4 hours and Ativan every 4 hours.  Scopolamine patch.

## 2024-11-18 NOTE — ASSESSMENT & PLAN NOTE
Likely secondary to her bilateral extensive pneumonia vs pneumonitis  On 4L NC at home  Currently on high flow nasal cannula and intermittent bipap  Pulm consulted   CTA chest ruled out PE.  Echo done last month showed normal systolic function.   Currently on high flow 35%  P.o. steroids changed to Solu-Medrol  Bactrim to prevent PCP  Respiratory status is not improving.  Palliative care consulted.    Now on 100% non-rebreather.  Discontinue p.o. meds.

## 2024-11-18 NOTE — CARE UPDATE
11/18/24 0613   Patient Assessment/Suction   Level of Consciousness (AVPU) alert   Respiratory Effort Short of breath   Expansion/Accessory Muscles/Retractions no use of accessory muscles   Skin Integrity   $ Wound Care Tech Time 15 min   Area Observed Left;Right;Cheek;Behind ear;Nares   Skin Appearance without discoloration   PRE-TX-O2   Device (Oxygen Therapy) high flow nasal cannula w/device   $ Is the patient on High Flow Oxygen? Yes   Humidification temp set 33   Humidification temp actual 33   Flow (L/min) (Oxygen Therapy) 40   Oxygen Concentration (%) 100   SpO2 (!) 94 %   Pulse Oximetry Type Continuous   $ Pulse Oximetry - Multiple Charge Pulse Oximetry - Multiple   Incentive Spirometer   $ Incentive Spirometer Charges unable to perform   Ready to Wean/Extubation Screen   FIO2<=50 (chart decimal) (!) 1

## 2024-11-18 NOTE — PT/OT/SLP DISCHARGE
Occupational Therapy Discharge Summary    Alexa Otreo  MRN: [id number]   Principal Problem: Acute on chronic hypoxic respiratory failure      Patient Discharged from acute Occupational Therapy on 11/18/24.  Please refer to prior OT note dated 11/13/24 for functional status.    Assessment:      Patient appropriate for care in another setting.    Objective:     GOALS:   Multidisciplinary Problems       Occupational Therapy Goals          Problem: Occupational Therapy    Goal Priority Disciplines Outcome Interventions   Occupational Therapy Goal     OT, PT/OT Progressing    Description: Goals to be met by: 12/06/2024     Patient will increase functional independence with ADLs by performing:    UE Dressing with Supervision.  LE Dressing with Supervision.  Grooming while standing at sink with Supervision.  Toileting from bedside commode with Supervision for hygiene and clothing management.   Toilet transfer to bedside commode with Supervision.                         Reasons for Discontinuation of Therapy Services  Transfer to alternate level of care.      Plan:     Patient Discharged to: Palliative Care/Hospice    11/18/2024

## 2024-11-18 NOTE — PT/OT/SLP DISCHARGE
Physical Therapy Discharge Summary    Name: Alexa Otero  MRN: [id number]   Principal Problem: Acute on chronic hypoxic respiratory failure     Patient Discharged from acute Physical Therapy on 2024.  Please refer to prior PT noted date on  for functional status.     Assessment:     Patient appropriate for care in another setting.    Objective:     GOALS:   Multidisciplinary Problems       Physical Therapy Goals          Problem: Physical Therapy    Goal Priority Disciplines Outcome Interventions   Physical Therapy Goal     PT, PT/OT Progressing    Description: Goals to be met by: 24     Patient will increase functional independence with mobility by performin. Supine to sit with Supervision  2. Sit to stand transfer with Supervision  3. Bed to chair transfer with Supervision using Rolling Walker  4. Gait  x 100 feet with Supervision using Rolling Walker.                              Reasons for Discontinuation of Therapy Services  Patient is unable to continue work toward goals because of medical or psychosocial complications.      Plan:     Patient Discharged to: Palliative Care/Hospice.      2024

## 2024-11-18 NOTE — RESPIRATORY THERAPY
RT CALLED TO BEDSIDE DUE TO PT DESATTING TO 88-89%. PT STATES SHE FEEL AS THOUGH SHE CAN NOT TAKE A DEEP BREATH AND HAVING TROUBLE BREATHING. I GAVE HER A.M. SCHEDULED [place] EARLY. SHE STATES IT HAS HELPED AT THIS TIME AND PT'S O2 SAT IS READING 95% HEART RATE 77. PT REMAINS ON VAPOTHERM 40L 100% FIO2

## 2024-11-18 NOTE — RESPIRATORY THERAPY
11/17/24 2033   Patient Assessment/Suction   Level of Consciousness (AVPU) responds to voice   Respiratory Effort Normal;Unlabored   Expansion/Accessory Muscles/Retractions no use of accessory muscles;expansion symmetric;no retractions   All Lung Fields Breath Sounds Anterior:;Lateral:;diminished   Rhythm/Pattern, Respiratory no shortness of breath reported;unlabored   Cough Frequency no cough   Skin Integrity   $ Wound Care Tech Time 15 min   Area Observed Left;Right;Nares;Behind ear   Skin Appearance without discoloration   PRE-TX-O2   Device (Oxygen Therapy) high flow nasal cannula w/device  (VAPO)   Humidification temp set 33   Humidification temp actual 33   Flow (L/min) (Oxygen Therapy) 40   Oxygen Concentration (%) 100   SpO2 97 %   Pulse Oximetry Type Continuous  (BEDSIDE)   $ Pulse Oximetry - Multiple Charge Pulse Oximetry - Multiple   Pulse 74   Resp 17   Aerosol Therapy   $ Aerosol Therapy Charges Aerosol Treatment   Daily Review of Necessity (SVN) completed   Respiratory Treatment Status (SVN) given   Treatment Route (SVN) in-line   Patient Position semi-Morrell's   Post Treatment Assessment (SVN) breath sounds unchanged   Signs of Intolerance (SVN) none   Incentive Spirometer   $ Incentive Spirometer Charges unable to perform  (PT SLEEPING)   Ready to Wean/Extubation Screen   FIO2<=50 (chart decimal) (!) 1   Education   $ Education Bronchodilator;15 min

## 2024-11-18 NOTE — SUBJECTIVE & OBJECTIVE
Interval History: Pateint evaluated in bed on 100% non-rebreather.  Patient reports she was uncomfortable all night and did not sleep well.  She appears uncomfortable and dyspneic with conversation She is not eating or drinking per family.    She is refusing vital signs.     Discussed with patient and family patient focused care.  They are agreeable.  Will order IV morphine and Ativan every 4 hours for comfort.  Offered spiritual counseling.  Family declined as this has been addressed.    Patient would benefit from inpatient hospice.    Review of Systems   Respiratory:  Positive for shortness of breath.    Psychiatric/Behavioral:  Negative for agitation and confusion.      Objective:     Vital Signs (Most Recent):  Temp: 97.7 °F (36.5 °C) (11/18/24 0748)  Pulse: 80 (11/18/24 0747)  Resp: 20 (11/18/24 0747)  BP: (!) 152/75 (11/18/24 0747)  SpO2: 99 % (11/18/24 0747) Vital Signs (24h Range):  Temp:  [97 °F (36.1 °C)-97.7 °F (36.5 °C)] 97.7 °F (36.5 °C)  Pulse:  [60-87] 80  Resp:  [17-21] 20  SpO2:  [87 %-99 %] 99 %  BP: (134-176)/(64-81) 152/75     Weight: 49.5 kg (109 lb 2 oz)  Body mass index is 18.73 kg/m².    Intake/Output Summary (Last 24 hours) at 11/18/2024 1029  Last data filed at 11/18/2024 0853  Gross per 24 hour   Intake 1120 ml   Output 1400 ml   Net -280 ml         Physical Exam  Constitutional:       Appearance: Normal appearance.   HENT:      Nose: No congestion.      Mouth/Throat:      Mouth: Mucous membranes are dry.   Cardiovascular:      Rate and Rhythm: Normal rate and regular rhythm.   Pulmonary:      Effort: Respiratory distress present.      Breath sounds: Rhonchi present. No wheezing or rales.      Comments: 100% NRB  Abdominal:      General: Abdomen is flat.      Palpations: Abdomen is soft.      Tenderness: There is abdominal tenderness (epigastric).   Skin:     General: Skin is warm and dry.   Neurological:      General: No focal deficit present.      Mental Status: She is alert. She is  "disoriented.   Psychiatric:         Mood and Affect: Mood normal.             Significant Labs: All pertinent labs within the past 24 hours have been reviewed.  CBC: No results for input(s): "WBC", "HGB", "HCT", "PLT" in the last 48 hours.  CMP: No results for input(s): "NA", "K", "CL", "CO2", "GLU", "BUN", "CREATININE", "CALCIUM", "PROT", "ALBUMIN", "BILITOT", "ALKPHOS", "AST", "ALT", "ANIONGAP", "EGFRNONAA" in the last 48 hours.    Invalid input(s): "ESTGFAFRICA"    Significant Imaging: I have reviewed all pertinent imaging results/findings within the past 24 hours.  "

## 2024-11-18 NOTE — PROGRESS NOTES
Pulmonary/Critical Care Progress Note      PATIENT NAME: Alexa Otero  MRN: [id number]  TODAY'S DATE: 2024  11:55 AM  ADMIT DATE: 2024  AGE: 84 y.o. 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    She reports progressive fatigue weakness prior to admission to the hospital.  She reports that she slid out of her bed and that is the reason she came to the hospital.  She reports that she has not had any fever cough night sweats, but she has some worsening shortness of breath.    11/3   - rough night, had worsening shortness of breath and is required non-rebreather in addition no nasal cannula currently she is on high-flow at 10 liters/minute     the patient is feeling a little better today.  She is on 15 L high-flow nasal cannula.  Her CT shows her chronically infiltrated right lung with the surrounding effusion.  Her left lung which was normal looking 2 weeks ago now is diffusely infiltrated with ground-glass opacities.  This must be the Alimta.     the patient is improving with the steroids.  She looks much brighter today.  She is feeling better.  She is on 10 L high-flow nasal cannula.     the patient is very sleepy today.  The nurse reports she was up earlier and ate breakfast and sat up.  Her oxygen is set on 10 L.  Her sats remain 99% on 5 L. I have asked the nurse to try to wean this.     the patient is looking good today.  She is down to 3 L nasal cannula.  Her chest x-ray is improving but her left thorax is not yet normal. We need to get her priest out and her walking.     the patient is too weak  to participate with PT she says.  She also states that it makes her very short of breath.  Her saturations are difficult to .  Urology would like to leave the Rausch in.  Obviously, the patient is going to need to go to a nursing home from here.  She states she will be here over the weekend.    11/9 the patient states physical therapy has not yet come to see her today.  States she is feeling good from the waist up.  She is on 4 L nasal cannula.    11/10 the patient was not seen by physical therapy yesterday.  This is a shame.  She is concerned about still having a Rausch catheter.  Explained that Dr. Jon isn't coming to the hospital to see her, but she needs to get to him to be evaluated. The patient's oxygen requirements are higher again today.  Will increase her prednisone to 60 mg and see if we can not stabilize this.      11/11 .  The patient's breathing is not as good.  She is dyspneic with speech.  Will resume Solu-Medrol.  Have added Bactrim DS q.o.d. for PCP prevention.    11/12 The patient is now requiring nearly maximum vapotherm to oxygenate.  Her CXR is not appreciably changed.  Have pushed her back to 60 q 6 of solumedrol. Discussed with Dr. Cassidy the utility of bronchoscopy.  Given her compromised state she would need to be intubated and I don't know if I could get her extubated. Unfortunately, all we have to offer is steroids and will see if she turns around again.     11/13 the patient is on 30 L and 85% Vapotherm.  Looks very fatigued.  She had multiple questions after being visited by Sudha from palliative care.  Obviously, the patient can not go home.  We have no way to get enough oxygen to her.  I think that if she is not better by tomorrow we should choose hospice and then the 2 choices will be to try to get her to an LTAC with hospice or to do inpatient hospice here and withdraw care.  Discussed the patient, her , and her daughter.    11/14 the patient is still requiring  maximum Vapotherm to oxygenate.  She desaturates severely when she eats.  She has decided to go to hospice next Monday or Tuesday after her friends and family have visited.  We can certainly move the patient to a medical-surgical unit.    11/15/2024 - Stable overnight still on vapotherm and desats easily.  No distress and we discussed her goals and she wants to focus on comfort.    11/16/2024 - Stable overnight, had good night and no new complaints.  No new needs at this time.  She is waiting for a few other family members to arrive before she decides on transition fully to comfort measures but she wants the focus of her care to be comfort and would like less blood work, etc.    11/17/2024 - Stable overnight, no new issues reported.  No new needs reported.      11/18/2024 - Had a rough night, sleeping this AM and is declining.  Seen by Dr Mendes and she is transitioning to comfort care and hospice.    REVIEW OF SYSTEMS  General: Feeling weak  Eyes: Vision is good.  ENT:  No sinusitis or pharyngitis.   Heart: No chest pain or palpitations.  Lungs:  Feeling more short of breath.  GI: No Nausea, vomiting, constipation, diarrhea, or reflux.  : No dysuria, hesitancy, or nocturia.  Skin: No lesions or rashes.  Musculoskeletal:  Very weak..  Neuro: No headaches or neuropathy.  Lymph: No edema or adenopathy.  Psych: No anxiety or depression.  Endo: No weight change.        No change in the patient's Past Medical History, Past Surgical History, Social History or Family History since admission.    VITAL SIGNS (MOST RECENT)  Temp: 97.7 °F (36.5 °C) (11/18/24 0748)  Pulse: 74 (11/18/24 1444)  Resp: 18 (11/18/24 1520)  BP: (!) 152/75 (11/18/24 0747)  SpO2: (!) 91 % (11/18/24 1258)    INTAKE AND OUTPUT (LAST 24 HOURS):  Intake/Output Summary (Last 24 hours) at 11/18/2024 1546  Last data filed at 11/18/2024 0853  Gross per 24 hour   Intake 920 ml   Output 1000 ml   Net -80 ml       WEIGHT  Wt Readings from Last 1 Encounters:  "  11/01/24 49.5 kg (109 lb 2 oz)         PHYSICAL EXAM  GENERAL: Older patient in no distress, looking frail.  HEENT: Pupils equal and reactive. Extraocular movements intact. Nose intact.  Pharynx moist.  NECK: Supple.   HEART: Regular rate and rhythm. No murmur or gallop auscultated.  LUNGS:  There are diminished breath sounds on the right.  On the left there are diffuse crackles anteriorly and posteriorly. Lung excursion symmetrical. No change in fremitus.  There is a port in thorax which is accessed.  ABDOMEN: Bowel sounds present. Non-tender, no masses palpated.  : Normal anatomy. Rausch with clear urine.  EXTREMITIES: Normal muscle tone and joint movement, no cyanosis or clubbing.  She is very weak.  LYMPHATICS: No adenopathy palpated, no edema.  SKIN: Dry, intact, no lesions.  There is a stage I decubitus on the buttock.    NEURO: Cranial nerves II-XII intact. Motor strength 5/5 bilaterally, upper and lower extremities.  PSYCH: Appropriate affect.        CBC LAST (LAST 24 HOURS)  No results for input(s): "WBC", "RBC", "HGB", "HCT", "MCV", "MCH", "MCHC", "RDW", "PLT", "MPV", "GRAN", "LYMPH", "MONO", "BASO", "NRBC" in the last 24 hours.      CHEMISTRY LAST (LAST 24 HOURS)  No results for input(s): "NA", "K", "CL", "CO2", "ANIONGAP", "BUN", "CREATININE", "GLU", "CALCIUM", "PH", "MG", "ALBUMIN", "PROT", "ALKPHOS", "ALT", "AST", "BILITOT" in the last 24 hours.        LAST 7 DAYS MICROBIOLOGY   Microbiology Results (last 7 days)       ** No results found for the last 168 hours. **            MOST RECENT IMAGING  X-Ray Chest AP Portable  Narrative: EXAMINATION:  XR CHEST AP PORTABLE    CLINICAL HISTORY:  pneumonitis;    FINDINGS:  Portable chest radiograph at 06:00 hours compared to numerous prior exams including 11/10/2024 shows no significant interval change.  Impression: No significant interval change.    Electronically signed by: Davide Jeffrey  Date:    11/11/2024  Time:    09:04      CURRENT VISIT EKG  Results " for orders placed or performed during the hospital encounter of 11/01/24   EKG 12-lead   Result Value Ref Range    QRS Duration 56 ms    [facility] QTC Calculation 482 ms    Narrative    Test Reason : R06.02,    Vent. Rate : 097 BPM     Atrial Rate : 097 BPM     P-R Int : 156 ms          QRS Dur : 056 ms      QT Int : 380 ms       P-R-T Axes : 044 -06 100 degrees     QTc Int : 482 ms    Normal sinus rhythm  Low voltage QRS  Septal infarct (cited on or before 13-OCT-2023)  Abnormal ECG  When compared with ECG of 25-OCT-2024 10:48,  ST now depressed in Lateral leads    Referred By: [email or web address]   SELF           Confirmed By:        ECHOCARDIOGRAM RESULTS  Results for orders placed during the hospital encounter of 10/07/24    Echo    Interpretation Summary    Left Ventricle: The left ventricle is normal in size. Mildly increased wall thickness. There is mild concentric hypertrophy. Moderate global hypokinesis present. There is mildly reduced systolic function with a visually estimated ejection fraction of 40 - 45%. There is normal diastolic function.    Right Ventricle: Normal right ventricular cavity size. Wall thickness is normal. Systolic function is normal.    Left Atrium: Left atrium is moderately dilated.    Mitral Valve: There is bileaflet sclerosis. There is moderate mitral annular calcification present. There is moderate stenosis. The mean pressure gradient across the mitral valve is 8 mmHg at a heart rate of  bpm. There is mild regurgitation with a centrally directed jet.    Pulmonary Artery: The estimated pulmonary artery systolic pressure is 29 mmHg.    IVC/SVC: Normal venous pressure at 3 mmHg.        Oxygen INFORMATION  Oxygen Concentration (%):  [100] 100   Vapotherms 35l/min and 90%           PLAN:.      Pneumonitis involving the left lung    Lepidic adenocarcinoma    Pleural effusion  l  Emphysema  - continuing bronchodilators  Acute on chronic hypoxemic respiratory failure  - on 40 liters and  100%  Thrombocytopenia  nemia    Agree with plans to transition to comfort care, meds have been changed to comfort    I will sign off please reconsult if I can be of further assistance      Virgil Padilla MD  [facility] Pulmonary/Critical Care  11/18/2024

## 2024-11-18 NOTE — SUBJECTIVE & OBJECTIVE
Interval History:  Having significant respiratory distress overnight and this morning.  She was much improved during my visit and without complaints.  Enjoying a doughnut.    Medications:  Continuous Infusions:  Scheduled Meds:   albuterol-ipratropium  3 mL Nebulization Q6H WAKE    scopolamine  1 patch Transdermal Q3 Days     PRN Meds:  Current Facility-Administered Medications:     acetaminophen, 650 mg, Oral, Q4H PRN    insulin aspart U-100, 0-10 Units, Subcutaneous, QID (AC + HS) PRN    lactulose, 20 g, Oral, Q6H PRN    lorazepam, 1 mg, Intravenous, Q1H PRN    LORazepam, 0.5 mg, Oral, Q4H PRN    melatonin, 6 mg, Oral, Nightly PRN    morphine, 2 mg, Intravenous, Q15 Min PRN    ondansetron, 4 mg, Intravenous, Q6H PRN    Objective:     Vital Signs (Most Recent):  Temp: 97.7 °F (36.5 °C) (11/18/24 0748)  Pulse: 80 (11/18/24 1258)  Resp: (!) 22 (11/18/24 1258)  BP: (!) 152/75 (11/18/24 0747)  SpO2: (!) 91 % (11/18/24 1258) Vital Signs (24h Range):  Temp:  [97 °F (36.1 °C)-97.7 °F (36.5 °C)] 97.7 °F (36.5 °C)  Pulse:  [60-87] 80  Resp:  [17-22] 22  SpO2:  [89 %-99 %] 91 %  BP: (138-176)/(69-81) 152/75     Weight: 49.5 kg (109 lb 2 oz)  Body mass index is 18.73 kg/m².       Physical Exam  Vitals and nursing note reviewed.   Constitutional:       General: She is not in acute distress.     Appearance: She is ill-appearing.   HENT:      Mouth/Throat:      Mouth: Mucous membranes are moist.   Eyes:      General: No scleral icterus.     Pupils: Pupils are equal, round, and reactive to light.   Cardiovascular:      Rate and Rhythm: Normal rate and regular rhythm.      Pulses: Normal pulses.   Pulmonary:      Effort: No respiratory distress.      Comments: Mild tachypnea when speaking, able to complete full sentences. Vapotherm in use.   Skin:     General: Skin is warm and dry.   Neurological:      General: No focal deficit present.      Mental Status: She is alert and oriented to person, place, and time.   Psychiatric:         " Mood and Affect: Mood normal.         Behavior: Behavior normal.         Thought Content: Thought content normal.         Judgment: Judgment normal.            Review of Symptoms      Symptom Assessment (ESAS 0-10 Scale)  Pain:  0  Dyspnea:  1  Anxiety:  2  Nausea:  0  Depression:  0  Anorexia:  0  Fatigue:  5  Insomnia:  0  Restlessness:  0  Agitation:  0         Performance Status:  20    Living Arrangements:  Lives with spouse    Psychosocial/Cultural:   See Palliative Psychosocial Note: No  **Primary  to Follow**  Palliative Care  Consult: No        Advance Care Planning   Advance Directives:   Living Will: Yes        Copy on chart: Yes    Do Not Resuscitate Status: Yes    Medical Power of : Yes      Decision Making:  Patient answered questions and Family answered questions  Goals of Care: What is most important right now is to focus on comfort and QOL . Accordingly, we have decided that the best plan to meet the patient's goals includes enrolling in hospice care.         Significant Labs: All pertinent labs within the past 24 hours have been reviewed.  CBC:   Recent Labs   Lab 11/15/24  0448   WBC 15.42*   HGB 7.6*   HCT 23.9*   *        BMP:  No results for input(s): "GLU", "NA", "K", "CL", "CO2", "BUN", "CREATININE", "CALCIUM", "MG" in the last 24 hours.    LFT:  Lab Results   Component Value Date    AST 12 11/15/2024    ALKPHOS 43 (L) 11/15/2024    BILITOT 0.3 11/15/2024     Albumin:   Albumin   Date Value Ref Range Status   11/15/2024 2.3 (L) 3.5 - 5.2 g/dL Final     Protein:   Total Protein   Date Value Ref Range Status   11/15/2024 4.5 (L) 6.0 - 8.4 g/dL Final     Lactic acid:   Lab Results   Component Value Date    LACTATE 0.7 11/01/2024    LACTATE 0.7 12/10/2023       Significant Imaging: I have reviewed all pertinent imaging results/findings within the past 24 hours.  "

## 2024-11-18 NOTE — PLAN OF CARE
[facility] will convert patient to [facility] today - multiple family members at bedside - all aware & agree with plan     [facility]  Discharge Final Note    Primary Care Provider: Idalia Greco MD    Expected Discharge Date: 11/19/2024    Final Discharge Note (most recent)       Final Note - 11/18/24 1334          Final Note    Assessment Type Final Discharge Note     Anticipated Discharge Disposition Hospice/Medical Facility        Post-Acute Status    Post-Acute Authorization Hospice     Hospice Status Set-up Complete/Auth obtained     Discharge Delays None known at this time                     Important Message from Medicare  Important Message from Medicare regarding Discharge Appeal Rights: Given to patient/caregiver, Explained to patient/caregiver, Signed/date by patient/caregiver     Date IMM was signed: 11/18/24  Time IMM was signed: [id number]

## 2024-11-18 NOTE — PLAN OF CARE
Problem: Adult Inpatient Plan of Care  Goal: Plan of Care Review  Outcome: Progressing  Goal: Patient-Specific Goal (Individualized)  Outcome: Progressing  Goal: Optimal Comfort and Wellbeing  Outcome: Progressing     Problem: Sepsis/Septic Shock  Goal: Absence of Bleeding  Outcome: Progressing  Goal: Blood Glucose Level Within Targeted Range  Outcome: Progressing  Goal: Absence of Infection Signs and Symptoms  Outcome: Progressing  Goal: Optimal Nutrition Intake  Outcome: Progressing     Problem: Pneumonia  Goal: Fluid Balance  Outcome: Progressing  Goal: Resolution of Infection Signs and Symptoms  Outcome: Progressing  Goal: Effective Oxygenation and Ventilation  Outcome: Progressing     Problem: Fall Injury Risk  Goal: Absence of Fall and Fall-Related Injury  Outcome: Progressing     Problem: Skin Injury Risk Increased  Goal: Skin Health and Integrity  Outcome: Progressing     Problem: Infection  Goal: Absence of Infection Signs and Symptoms  Outcome: Progressing     Problem: Gas Exchange Impaired  Goal: Optimal Gas Exchange  Outcome: Progressing     Problem: Wound  Goal: Optimal Coping  Outcome: Progressing  Goal: Optimal Functional Ability  Outcome: Progressing  Goal: Absence of Infection Signs and Symptoms  Outcome: Progressing  Goal: Improved Oral Intake  Outcome: Progressing  Goal: Optimal Pain Control and Function  Outcome: Progressing  Goal: Skin Health and Integrity  Outcome: Progressing  Goal: Optimal Wound Healing  Outcome: Progressing     Problem: Malnutrition  Goal: Improved Nutritional Intake  Outcome: Progressing     Problem: End-of-Life Care  Goal: Comfort, Peace and Preserved Dignity  Outcome: Progressing

## 2024-11-18 NOTE — PROGRESS NOTES
Formerly [facility], later [facility]  Palliative Medicine  Progress Note    Patient Name: Alexa Otero  MRN: [id number]  Admission Date: 11/1/2024  Hospital Length of Stay: 17 days  Code Status: DNR   Attending Provider: Idalia Strong MD  Consulting Provider: Zev Mendes MD  Primary Care Physician: Idalia Greco MD  Principal Problem:Acute on chronic hypoxic respiratory failure    Patient information was obtained from patient, spouse/SO, relative(s), past medical records, and primary team.      Assessment/Plan:     Palliative Care  Goals of care, counseling/discussion  I reviewed the patient's chart and discussed the case with the patient's team.      I examined Alexa Otero at bedside.  The patient maintains capacity for complex medical decision-making.  I spoke with patient and family at bedside.    They remain well up-to-date on her overall care.  At this point her primary goals are comfort and relief of suffering.  I recommended enlisting the services of hospice today.  They agree.    They specifically asked my opinion on continuing with intense oxygen therapy.  I explained without the oxygen therapy I would not expect her to live alone time; likely minutes to hours.  They asked about the timing of discontinuing high-flow oxygen therapy.  I explained this timing would be up to them, or if her condition changes and high-flow oxygen therapies only prolonging suffering in the point of death.  They understood.    Discussed the above with inpatient hospice service and primary team.    I have adjusted her comfort care regimen as well.    I appreciate being involved.  I hope I have been helpful.          I will follow-up with patient. Please contact us if you have any additional questions.    Subjective:     Chief Complaint:   Chief Complaint   Patient presents with    Generalized Weakness     Per EMS, patient slipped out of bed onto floor hitting bottom. Did not hit head. Realized she was weaker than normal. Hx  lung cancer. Sating 60s  on 4L in triage.       HPI:   From admission HPI:  84-year-old female with a past medical history of lung cancer actively getting chemo last of which was 5 days ago, who presented to the ER because of generalized weakness.  According to the patient, she woke up today, tried to get out of bed, but felt very weak, and slid beside the bed.  Did not hit her head, and did not lose consciousness.  She however could not get up however, and her  could not assist.  911 was called.  On arrival of EMS, patient was satting 88% on 4 L, and appeared to be short of breath.  She was brought to the ER for evaluation.   In the ER, vitals showed a blood pressure of 145/65, heart rate of 103, respiratory rate of 20, afebrile satting 60% on 4 L.  Immediately patient was placed on non-rebreather.  CBC with white count of 16.8, and macrocytic anemia.  CMP showed hypokalemia of 3.4.  First troponin is elevated at 46.  Point of care Lactic acid was 2.1.  Blood cultures were collected.  EKG showed sinus rhythm.  Chest x-ray showed no significant change of bilateral diffuse mixed interstitial and airspace lung opacities.  CTA chest was done, and it was negative for PE, but showed extensive interstitial and airspace opacities throughout both lungs, having significantly worsened in the left lung compared to prior CT. Unchanged small to moderate sized bilateral pleural effusions.  Cefepime was ordered, and patient will be admitted to Medicine for further care of her severe sepsis.    Palliative medicine consult:  Pt currently admitted and being tx for acute hypoxemic resp failure and pneumonia. She has h/o lung cancer and was on alimta tx. Chemo currently on hold. Pts O2 requirements have increased and she is currently on vapotherm at 30 L and 85% FiO2. We have been consulted for goals of care discussion.       Hospital Course:  No notes on file    Interval History:  Having significant respiratory distress  overnight and this morning.  She was much improved during my visit and without complaints.  Enjoying a doughnut.    Medications:  Continuous Infusions:  Scheduled Meds:   albuterol-ipratropium  3 mL Nebulization Q6H WAKE    scopolamine  1 patch Transdermal Q3 Days     PRN Meds:  Current Facility-Administered Medications:     acetaminophen, 650 mg, Oral, Q4H PRN    insulin aspart U-100, 0-10 Units, Subcutaneous, QID (AC + HS) PRN    lactulose, 20 g, Oral, Q6H PRN    lorazepam, 1 mg, Intravenous, Q1H PRN    LORazepam, 0.5 mg, Oral, Q4H PRN    melatonin, 6 mg, Oral, Nightly PRN    morphine, 2 mg, Intravenous, Q15 Min PRN    ondansetron, 4 mg, Intravenous, Q6H PRN    Objective:     Vital Signs (Most Recent):  Temp: 97.7 °F (36.5 °C) (11/18/24 0748)  Pulse: 80 (11/18/24 1258)  Resp: (!) 22 (11/18/24 1258)  BP: (!) 152/75 (11/18/24 0747)  SpO2: (!) 91 % (11/18/24 1258) Vital Signs (24h Range):  Temp:  [97 °F (36.1 °C)-97.7 °F (36.5 °C)] 97.7 °F (36.5 °C)  Pulse:  [60-87] 80  Resp:  [17-22] 22  SpO2:  [89 %-99 %] 91 %  BP: (138-176)/(69-81) 152/75     Weight: 49.5 kg (109 lb 2 oz)  Body mass index is 18.73 kg/m².       Physical Exam  Vitals and nursing note reviewed.   Constitutional:       General: She is not in acute distress.     Appearance: She is ill-appearing.   HENT:      Mouth/Throat:      Mouth: Mucous membranes are moist.   Eyes:      General: No scleral icterus.     Pupils: Pupils are equal, round, and reactive to light.   Cardiovascular:      Rate and Rhythm: Normal rate and regular rhythm.      Pulses: Normal pulses.   Pulmonary:      Effort: No respiratory distress.      Comments: Mild tachypnea when speaking, able to complete full sentences. Vapotherm in use.   Skin:     General: Skin is warm and dry.   Neurological:      General: No focal deficit present.      Mental Status: She is alert and oriented to person, place, and time.   Psychiatric:         Mood and Affect: Mood normal.         Behavior: Behavior  "normal.         Thought Content: Thought content normal.         Judgment: Judgment normal.            Review of Symptoms      Symptom Assessment (ESAS 0-10 Scale)  Pain:  0  Dyspnea:  1  Anxiety:  2  Nausea:  0  Depression:  0  Anorexia:  0  Fatigue:  5  Insomnia:  0  Restlessness:  0  Agitation:  0         Performance Status:  20    Living Arrangements:  Lives with spouse    Psychosocial/Cultural:   See Palliative Psychosocial Note: No  **Primary  to Follow**  Palliative Care  Consult: No        Advance Care Planning  Advance Directives:   Living Will: Yes        Copy on chart: Yes    Do Not Resuscitate Status: Yes    Medical Power of : Yes      Decision Making:  Patient answered questions and Family answered questions  Goals of Care: What is most important right now is to focus on comfort and QOL . Accordingly, we have decided that the best plan to meet the patient's goals includes enrolling in hospice care.         Significant Labs: All pertinent labs within the past 24 hours have been reviewed.  CBC:   Recent Labs   Lab 11/15/24  0448   WBC 15.42*   HGB 7.6*   HCT 23.9*   *        BMP:  No results for input(s): "GLU", "NA", "K", "CL", "CO2", "BUN", "CREATININE", "CALCIUM", "MG" in the last 24 hours.    LFT:  Lab Results   Component Value Date    AST 12 11/15/2024    ALKPHOS 43 (L) 11/15/2024    BILITOT 0.3 11/15/2024     Albumin:   Albumin   Date Value Ref Range Status   11/15/2024 2.3 (L) 3.5 - 5.2 g/dL Final     Protein:   Total Protein   Date Value Ref Range Status   11/15/2024 4.5 (L) 6.0 - 8.4 g/dL Final     Lactic acid:   Lab Results   Component Value Date    LACTATE 0.7 11/01/2024    LACTATE 0.7 12/10/2023       Significant Imaging: I have reviewed all pertinent imaging results/findings within the past 24 hours.    > 60 min visit spent in chart review, face to face discussion of goals of care,  symptom assessment, coordination of care and emotional " support.      Zev Mendes MD  Palliative Medicine  [facility]

## 2024-11-18 NOTE — PROGRESS NOTES
[facility] Medicine  Progress Note    Patient Name: Alexa Otero  MRN: [id number]  Patient Class: IP- Inpatient   Admission Date: 11/1/2024  Length of Stay: 17 days  Attending Physician: Idalia Strong MD  Primary Care Provider: Idalia Greco MD        Subjective:     Principal Problem:Acute on chronic hypoxic respiratory failure        HPI:  84-year-old female with a past medical history of lung cancer actively getting chemo last of which was 5 days ago, who presented to the ER because of generalized weakness.  According to the patient, she woke up today, tried to get out of bed, but felt very weak, and slid beside the bed.  Did not hit her head, and did not lose consciousness.  She however could not get up however, and her  could not assist.  911 was called.  On arrival of EMS, patient was satting 88% on 4 L, and appeared to be short of breath.  She was brought to the ER for evaluation.    In the ER, vitals showed a blood pressure of 145/65, heart rate of 103, respiratory rate of 20, afebrile satting 60% on 4 L.  Immediately patient was placed on non-rebreather.  CBC with white count of 16.8, and macrocytic anemia.  CMP showed hypokalemia of 3.4.  First troponin is elevated at 46.  Point of care Lactic acid was 2.1.  Blood cultures were collected.  EKG showed sinus rhythm.  Chest x-ray showed no significant change of bilateral diffuse mixed interstitial and airspace lung opacities.  CTA chest was done, and it was negative for PE, but showed extensive interstitial and airspace opacities throughout both lungs, having significantly worsened in the left lung compared to prior CT. Unchanged small to moderate sized bilateral pleural effusions.  Cefepime was ordered, and patient will be admitted to Medicine for further care of her severe sepsis.    Overview/Hospital Course:  Ms. Otero has been monitored closely during her hospitalization. She was admitted on 11/1/2024 with acute on  chronic hypoxemic resp failure. CTA chest reveals extensive interstitial opacities c/w multifocal pna and/or ARDS. She requires high flow nasal cannula up to 15L now on 13L. Pulm and ID have been consulted. Pulm recommends broad spectrum abx - cefepime, doxy, and vanc initially. MRSA screen negative and vanc d/c'ed. She has stage IV lung ca and completed Carbo/Pemetrexed last year and is currently on pemetrexed (Alimta) maintenance with last dose 10/28. This can cause an interstitial pneumonitis and pulm has started prednisone. Plan for bronchoscopy on Monday if no improvement and recommended continuing eliquis. BiPAP qHS and naps. Duonebs Q6h. ID has ordered a respiratory infection panel that is negative. She's had abdominal bloating and discomfort for some time and had an outpt CT abd/pelvis with gastric wall thickening that is is nonspecific could be related to gastritis. She has a history of peptic ulcers and PCP started protonix/carafate. KUB on 11/2 with nonobstructive bowel gas pattern and moderate stool burden and pt was treated with laxative suppository. She has chronic macrocytic anemia that appears at baseline. She had a leukocytosis with left shift on admission that has trended down from 16K-->12K-->8K. CRP 36, procal 2.9. Pt had a BM on 11/2, but abd remained distended. She was found to be retaining a significant amount of urine on bladder scan >900 and priest was placed with 1500mL out with resolution of abd distention. Pulm increased steriods on 11/3 to IV solumedrol, she's requiring BiPAP support and was given IV lasix. Macrocytic anemia at baseline on admission but trending down and she will receive 1 unit PRBCs 11/4. She has developed steroid induced hyperglycemia and insulin sliding titrated up to moderate dose for tighter glucose control. Priest d/c'ed on 11/5 and had to be reinserted on 11/06. PT/OT consulted and pt up to chair on 11/5. She desatted with movement and took some time and increased  supplemental O2 to recover. Prior to moving to the chair, O2 was weaned down to 10L high flow. 11/6/2024 pulmonology change patient to p.o. prednisone as her oxygen demand had improved.  Patient was being maintained on approximately 5 L of oxygen.  11/11/24 patient was working with physical therapy when she became hypoxic requiring Vapotherm.  At this time she is currently requiring max dose of Vapotherm.  Bronchoscopy is not suggested by pulmonology due to patient's fragile condition.  Patient was switched back to IV Solu-Medrol q.6 I was per pulmonology.  Long discussion was had with the patient concerning inpatient hospice versus comfort measures, patient and spouse would like comfort measures but not until the patient saw her family.  She was seen and evaluated by pulmonology as well as palliative Medicine she requested to remain her present room until her family was present in the action was made to transfer to comfort care.  Requested less lab work, so daily labs were discontinued.    Interval History: Pateint evaluated in bed on 100% non-rebreather.  Patient reports she was uncomfortable all night and did not sleep well.  She appears uncomfortable and dyspneic with conversation She is not eating or drinking per family.    She is refusing vital signs.     Discussed with patient and family patient focused care.  They are agreeable.  Will order IV morphine and Ativan every 4 hours for comfort.  Offered spiritual counseling.  Family declined as this has been addressed.    Patient would benefit from inpatient hospice.    Review of Systems   Respiratory:  Positive for shortness of breath.    Psychiatric/Behavioral:  Negative for agitation and confusion.      Objective:     Vital Signs (Most Recent):  Temp: 97.7 °F (36.5 °C) (11/18/24 0748)  Pulse: 80 (11/18/24 0747)  Resp: 20 (11/18/24 0747)  BP: (!) 152/75 (11/18/24 0747)  SpO2: 99 % (11/18/24 0747) Vital Signs (24h Range):  Temp:  [97 °F (36.1 °C)-97.7 °F (36.5  "°C)] 97.7 °F (36.5 °C)  Pulse:  [60-87] 80  Resp:  [17-21] 20  SpO2:  [87 %-99 %] 99 %  BP: (134-176)/(64-81) 152/75     Weight: 49.5 kg (109 lb 2 oz)  Body mass index is 18.73 kg/m².    Intake/Output Summary (Last 24 hours) at 11/18/2024 1029  Last data filed at 11/18/2024 0853  Gross per 24 hour   Intake 1120 ml   Output 1400 ml   Net -280 ml         Physical Exam  Constitutional:       Appearance: Normal appearance.   HENT:      Nose: No congestion.      Mouth/Throat:      Mouth: Mucous membranes are dry.   Cardiovascular:      Rate and Rhythm: Normal rate and regular rhythm.   Pulmonary:      Effort: Respiratory distress present.      Breath sounds: Rhonchi present. No wheezing or rales.      Comments: 100% NRB  Abdominal:      General: Abdomen is flat.      Palpations: Abdomen is soft.      Tenderness: There is abdominal tenderness (epigastric).   Skin:     General: Skin is warm and dry.   Neurological:      General: No focal deficit present.      Mental Status: She is alert. She is disoriented.   Psychiatric:         Mood and Affect: Mood normal.             Significant Labs: All pertinent labs within the past 24 hours have been reviewed.  CBC: No results for input(s): "WBC", "HGB", "HCT", "PLT" in the last 48 hours.  CMP: No results for input(s): "NA", "K", "CL", "CO2", "GLU", "BUN", "CREATININE", "CALCIUM", "PROT", "ALBUMIN", "BILITOT", "ALKPHOS", "AST", "ALT", "ANIONGAP", "EGFRNONAA" in the last 48 hours.    Invalid input(s): "ESTGFAFRICA"    Significant Imaging: I have reviewed all pertinent imaging results/findings within the past 24 hours.    Assessment/Plan:      * Acute on chronic hypoxic respiratory failure  Likely secondary to her bilateral extensive pneumonia vs pneumonitis  On 4L NC at home  Currently on high flow nasal cannula and intermittent bipap  Pulm consulted   CTA chest ruled out PE.  Echo done last month showed normal systolic function.   Currently on high flow 35%  P.o. steroids changed " to Solu-Medrol  Bactrim to prevent PCP  Respiratory status is not improving.  Palliative care consulted.    Now on 100% non-rebreather.  Discontinue p.o. meds.      Goals of care, counseling/discussion  Advance Care Planning    See palliative care note    Urinary retention  Constipation likely contributing  She had a couple of Bms and started bowel regimen  Priest placed for significant retention   D/C priest 11/5 and had to be reinserted on 11/6  Flomax initiated  Urology consulted:  Recommended follow up outpatient.  Keep Priest in place for 5-7 days before voiding trial.  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 Most recent hemoglobin and hematocrit are listed below.  Recent Labs     11/14/24  0553 11/15/24  0448   HGB 7.3* 7.6*   HCT 23.3* 23.9*       Plan  - Monitor serial CBC: Daily  - Transfuse PRBC if patient becomes hemodynamically unstable, symptomatic or H/H drops below 7/21.  - Patient has received 1 units of PRBCs on 11/4  - Patient's anemia is currently stable  - pt requests decreased labs, so am labs discontinued.  Will check cbc prn    Pneumonitis  Patient was started on cefepime, doxy for atypicals, and Vanc, vanc d/c'ed with negative MRSA screen  Pulmonary and Infectious Disease following  Steroids increased by pulmonology   Antibiotic therapy complete    Antibiotics (From admission, onward)      Start     Stop Route Frequency Ordered    11/12/24 0900  sulfamethoxazole-trimethoprim 800-160mg per tablet 1 tablet         -- Oral Every other day 11/11/24 0839    11/01/24 1300  mupirocin 2 % ointment         11/06/24 0859 Nasl 2 times daily 11/01/24 1200            Microbiology Results (last 7 days)       ** No results found for the last 168 hours. **            Pleural effusion  Bilateral, stable.   Pulm consulted  In the setting of lung ca      Malignant neoplasm of lower lobe of right lung  Carbo/Pemetrexed until 4/2024 now on Pemetrexed  maintenance with last dose 10/28/24  Consult pt's oncologist, Dr. Cassidy following      Atrial fibrillation  Patient has paroxysmal (<7 days) atrial fibrillation. Patient is currently in sinus rhythm. [id number] Score: 5. The patients heart rate in the last 24 hours is as follows:  Pulse  Min: 68  Max: 81     Antiarrhythmics  Amiodarone and metoprolol    Anticoagulants  Eliquis    Plan  - Replete lytes with a goal of K>4, Mg >2  - Patient is anticoagulated, see medications listed above.  - Patient's afib is currently controlled    ACP (advance care planning)  See palliative care consult  Patient and spouse would like comfort measures but not until their family arrives.  Plan to transition to patient focused care.  Add morphine 2 mg IV every 4 hours and Ativan every 4 hours.  Scopolamine patch.      VTE Risk Mitigation (From admission, onward)           Ordered     IP VTE HIGH RISK PATIENT  Once         11/01/24 1158     Place sequential compression device  Until discontinued         11/01/24 1158                    Discharge Planning   ALYCIA: 11/19/2024     Code Status: DNR   Is the patient medically ready for discharge?:     Reason for patient still in hospital (select all that apply): Treatment  Discharge Plan A: Comfort care/withdrawal   Discharge Delays: None known at this time              Sudha Wise NP  Department of Hospital Medicine   [facility]

## 2024-11-18 NOTE — PLAN OF CARE
Salvador Mancilla is at the hospital to discuss GIP with pt and her family members at bedside.     11/18/24 1252   Post-Acute Status   Post-Acute Authorization Hospice   Hospice Status Referrals Sent   Discharge Plan   Discharge Plan A Inpatient Hospice   Discharge Plan B Hospice/home

## 2024-11-19 NOTE — HOSPITAL COURSE
The patient was admitted to inpatient hospice and placed on comfort care medications and treatments. Doses were increased as needed for comfort. The patient was weaned off high flow oxygen. On  she was noted to have no respiratory effort or pulse. She was declared  at 14:35 on 2024. The family was present and my condolences were given.

## 2024-11-19 NOTE — H&P
[facility] Medicine  History & Physical    Patient Name: Alexa Otero  MRN: [id number]  Patient Class: IP- Hospice  Admission Date: 11/18/2024  Attending Physician: Idalia Strong MD   Primary Care Provider: Idalia Greco MD         Patient information was obtained from family and chart.     Subjective:     Principal Problem:<principal problem not specified>    Chief Complaint:   Chief Complaint   Patient presents with    Shortness of Breath        HPI: Principal Problem:Acute on chronic hypoxic respiratory failure           HPI:  84-year-old female with a past medical history of lung cancer actively getting chemo last of which was 5 days ago, who presented to the ER because of generalized weakness.  According to the patient, she woke up today, tried to get out of bed, but felt very weak, and slid beside the bed.  Did not hit her head, and did not lose consciousness.  She however could not get up however, and her  could not assist.  911 was called.  On arrival of EMS, patient was satting 88% on 4 L, and appeared to be short of breath.  She was brought to the ER for evaluation.     In the ER, vitals showed a blood pressure of 145/65, heart rate of 103, respiratory rate of 20, afebrile satting 60% on 4 L.  Immediately patient was placed on non-rebreather.  CBC with white count of 16.8, and macrocytic anemia.  CMP showed hypokalemia of 3.4.  First troponin is elevated at 46.  Point of care Lactic acid was 2.1.  Blood cultures were collected.  EKG showed sinus rhythm.  Chest x-ray showed no significant change of bilateral diffuse mixed interstitial and airspace lung opacities.  CTA chest was done, and it was negative for PE, but showed extensive interstitial and airspace opacities throughout both lungs, having significantly worsened in the left lung compared to prior CT. Unchanged small to moderate sized bilateral pleural effusions.  Cefepime was ordered, and patient will be  admitted to Medicine for further care of her severe sepsis.    Ms. Otero has been monitored closely during her hospitalization. She was admitted on 11/1/2024 with acute on chronic hypoxemic resp failure. CTA chest reveals extensive interstitial opacities c/w multifocal pna and/or ARDS. She requires high flow nasal cannula up to 15L now on 13L. Pulm and ID have been consulted. Pulm recommends broad spectrum abx - cefepime, doxy, and vanc initially. MRSA screen negative and vanc d/c'ed. She has stage IV lung ca and completed Carbo/Pemetrexed last year and is currently on pemetrexed (Alimta) maintenance with last dose 10/28. This can cause an interstitial pneumonitis and pulm has started prednisone. Plan for bronchoscopy on Monday if no improvement and recommended continuing eliquis. BiPAP qHS and naps. Duonebs Q6h. ID has ordered a respiratory infection panel that is negative. She's had abdominal bloating and discomfort for some time and had an outpt CT abd/pelvis with gastric wall thickening that is is nonspecific could be related to gastritis. She has a history of peptic ulcers and PCP started protonix/carafate. KUB on 11/2 with nonobstructive bowel gas pattern and moderate stool burden and pt was treated with laxative suppository. She has chronic macrocytic anemia that appears at baseline. She had a leukocytosis with left shift on admission that has trended down from 16K-->12K-->8K. CRP 36, procal 2.9. Pt had a BM on 11/2, but abd remained distended. She was found to be retaining a significant amount of urine on bladder scan >900 and priest was placed with 1500mL out with resolution of abd distention. Pulm increased steriods on 11/3 to IV solumedrol, she's requiring BiPAP support and was given IV lasix. Macrocytic anemia at baseline on admission but trending down and she will receive 1 unit PRBCs 11/4. She has developed steroid induced hyperglycemia and insulin sliding titrated up to moderate dose for tighter  glucose control. Miracle d/c'ed on 11/5 and had to be reinserted on 11/06. PT/OT consulted and pt up to chair on 11/5. She desatted with movement and took some time and increased supplemental O2 to recover. Prior to moving to the chair, O2 was weaned down to 10L high flow. 11/6/2024 pulmonology change patient to p.o. prednisone as her oxygen demand had improved.  Patient was being maintained on approximately 5 L of oxygen.  11/11/24 patient was working with physical therapy when she became hypoxic requiring Vapotherm.  At this time she is currently requiring max dose of Vapotherm.  Bronchoscopy is not suggested by pulmonology due to patient's fragile condition.  Patient was switched back to IV Solu-Medrol q.6 I was per pulmonology.  Long discussion was had with the patient concerning inpatient hospice versus comfort measures, patient and spouse would like comfort measures but not until the patient saw her family.  She was seen and evaluated by pulmonology as well as palliative Medicine she requested to remain her present room until her family was present in the action was made to transfer to comfort care.  Requested less lab work, so daily labs were discontinued.    The patient and her family opted for inpatient hospice. She was admitted to inpatient hospice with comfort measures.        Past Medical History:   Diagnosis Date    Arthritis     Cardiac arrest 10/2023    Cataract     Chronic obstructive pulmonary disease, unspecified COPD type 03/20/2024    Colon polyp     Coronary artery disease 03/20/2024    Diabetes mellitus type II 2012    Encounter for blood transfusion     Extra-ovarian endometrioid adenocarcinoma 2020    GERD (gastroesophageal reflux disease)     History of chronic cough     clear productive    Hyperlipidemia     Hypertension     Macular degeneration     Ovarian cancer     PONV (postoperative nausea and vomiting)     Squamous cell carcinoma 1980's    precancer of cervix       Past Surgical  History:   Procedure Laterality Date    AUGMENTATION OF BREAST      BRONCHOSCOPY N/A 12/12/2023    Procedure: BRONCHOSCOPY;  Surgeon: Neva Christian MD;  Location: [facility];  Service: ENT;  Laterality: N/A;    BRONCHOSCOPY WITH FLUOROSCOPY Right 05/11/2023    Procedure: BRONCHOSCOPY, WITH FLUOROSCOPY;  Surgeon: Neva Christian MD;  Location: [facility] ENDO;  Service: Pulmonary;  Laterality: Right;    BRONCHOSCOPY WITH FLUOROSCOPY N/A 12/15/2023    Procedure: BRONCHOSCOPY, WITH FLUOROSCOPY;  Surgeon: Neva Christian MD;  Location: [facility];  Service: Pulmonary;  Laterality: N/A;    CATARACT EXTRACTION BILATERAL W/ ANTERIOR VITRECTOMY Bilateral     CHOLECYSTECTOMY      COLONOSCOPY      COLONOSCOPY N/A 04/20/2023    Procedure: COLONOSCOPY;  Surgeon: Sabino Stephenson MD;  Location: [facility];  Service: Endoscopy;  Laterality: N/A;    CORONARY STENT PLACEMENT  10/2023    x 3    ESOPHAGOGASTRODUODENOSCOPY N/A 06/20/2018    Procedure: EGD (ESOPHAGOGASTRODUODENOSCOPY);  Surgeon: Sabino Stephenson MD;  Location: [facility];  Service: Endoscopy;  Laterality: N/A;    ESOPHAGOGASTRODUODENOSCOPY N/A 03/31/2023    Procedure: EGD (ESOPHAGOGASTRODUODENOSCOPY);  Surgeon: aSbino Stephenson MD;  Location: [facility];  Service: Endoscopy;  Laterality: N/A;    ESOPHAGOGASTRODUODENOSCOPY N/A 12/11/2023    Procedure: EGD (ESOPHAGOGASTRODUODENOSCOPY);  Surgeon: Pretty Salgado MD;  Location: [facility];  Service: Endoscopy;  Laterality: N/A;    HYSTERECTOMY  1976    partial    INJECTION OF ANESTHETIC AGENT AROUND MEDIAL BRANCH NERVES INNERVATING LUMBAR FACET JOINT Right 05/10/2023    Procedure: Block-nerve-Lateral-branch-lumbar;  Surgeon: Agustin Robles MD;  Location: [facility] OR;  Service: Pain Management;  Laterality: Right;  L5 and s1,s2 LBB    INJECTION OF ANESTHETIC AGENT AROUND MEDIAL BRANCH NERVES INNERVATING LUMBAR FACET JOINT Right 05/30/2023    Procedure: Block-nerve-medial branch-lumbar;  Surgeon: Agustin Robles MD;  Location: [facility]  OR;  Service: Pain Management;  Laterality: Right;  L5, s1 ,s2 LBB #2    INJECTION OF ANESTHETIC AGENT AROUND NERVE Right 10/31/2023    Procedure: INTERCOSTAL NERVE BLOCK;  Surgeon: Washington Shankar MD;  Location: [facility] OR;  Service: Cardiothoracic;  Laterality: Right;    INJECTION, SACROILIAC JOINT Right 01/26/2023    Procedure: INJECTION,SACROILIAC JOINT;  Surgeon: Agustin Robles MD;  Location: [facility] OR;  Service: Pain Management;  Laterality: Right;    INSERTION OF TUNNELED CENTRAL VENOUS CATHETER (CVC) WITH SUBCUTANEOUS PORT Right 10/19/2020    Procedure: INSERTION, PORT-A-CATH;  Surgeon: Misti Mcfarland MD;  Location: [facility] OR;  Service: General;  Laterality: Right;    INTRAMEDULLARY RODDING OF TROCHANTER OF FEMUR Right 11/28/2021    Procedure: INSERTION, INTRAMEDULLARY LUC, FEMUR, TROCHANTER/RIGHT TFN DR FAJARDO NOTIFIED REP;  Surgeon: Kp Fajardo MD;  Location: [facility] OR;  Service: Orthopedics;  Laterality: Right;  SKIP    ROBOT-ASSISTED LAPAROSCOPIC LYMPHADENECTOMY USING DA NELLA XI N/A 09/21/2020    Procedure: XI ROBOTIC LYMPHADENECTOMY-pelvic and kell-aortic;  Surgeon: Altagracia Ray MD;  Location: [facility] OR;  Service: OB/GYN;  Laterality: N/A;    ROBOT-ASSISTED LAPAROSCOPIC OMENTECTOMY USING DA NELLA XI N/A 09/21/2020    Procedure: XI ROBOTIC OMENTECTOMY;  Surgeon: Altagracia Ray MD;  Location: [facility] OR;  Service: OB/GYN;  Laterality: N/A;    ROBOT-ASSISTED LAPAROSCOPIC SALPINGO-OOPHORECTOMY USING DA NELLA XI Bilateral 09/21/2020    Procedure: XI ROBOTIC SALPINGO-OOPHORECTOMY;  Surgeon: Altagracia Ray MD;  Location: [facility] OR;  Service: OB/GYN;  Laterality: Bilateral;    THORACOSCOPIC BIOPSY OF PLEURA Right 10/31/2023    Procedure: VATS, WITH PLEURA BIOPSY;  Surgeon: Washington Shankar MD;  Location: [facility] OR;  Service: Cardiothoracic;  Laterality: Right;  FROZEN SECTION   **KRISTEN-BETOT**    THORACOTOMY Right 10/31/2023    Procedure: THORACOTOMY;  Surgeon: Washington Shankar MD;  Location:  [facility] OR;  Service: Cardiothoracic;  Laterality: Right;    UPPER GASTROINTESTINAL ENDOSCOPY         Review of patient's allergies indicates:  No Known Allergies    Current Facility-Administered Medications on File Prior to Encounter   Medication    [DISCONTINUED] 0.9%  NaCl infusion (for blood administration)    [DISCONTINUED] acetaminophen tablet 650 mg    [DISCONTINUED] acetaminophen tablet 650 mg    [DISCONTINUED] albuterol-ipratropium 2.5 mg-0.5 mg/3 mL nebulizer solution 3 mL    [DISCONTINUED] aluminum-magnesium hydroxide-simethicone 200-200-20 mg/5 mL suspension 30 mL    [DISCONTINUED] dextrose 50% injection 12.5 g    [DISCONTINUED] dextrose 50% injection 25 g    [DISCONTINUED] glucagon (human recombinant) injection 1 mg    [DISCONTINUED] glucose chewable tablet 16 g    [DISCONTINUED] glucose chewable tablet 24 g    [DISCONTINUED] HYDROcodone-acetaminophen 5-325 mg per tablet 1 tablet    [DISCONTINUED] insulin aspart U-100 pen 0-10 Units    [DISCONTINUED] lactulose 20 gram/30 mL solution Soln 20 g    [DISCONTINUED] LORazepam injection 1 mg    [DISCONTINUED] LORazepam tablet 0.5 mg    [DISCONTINUED] magnesium oxide tablet 800 mg    [DISCONTINUED] magnesium oxide tablet 800 mg    [DISCONTINUED] melatonin tablet 6 mg    [DISCONTINUED] morphine injection 2 mg    [DISCONTINUED] morphine injection 2 mg    [DISCONTINUED] naloxone 0.4 mg/mL injection 0.02 mg    [DISCONTINUED] ondansetron injection 4 mg    [DISCONTINUED] potassium bicarbonate disintegrating tablet 35 mEq    [DISCONTINUED] potassium bicarbonate disintegrating tablet 50 mEq    [DISCONTINUED] potassium bicarbonate disintegrating tablet 60 mEq    [DISCONTINUED] potassium, sodium phosphates 280-160-250 mg packet 2 packet    [DISCONTINUED] potassium, sodium phosphates 280-160-250 mg packet 2 packet    [DISCONTINUED] potassium, sodium phosphates 280-160-250 mg packet 2 packet    [DISCONTINUED] scopolamine 1.3-1.5 mg (1 mg over 3 days) 1 patch    [DISCONTINUED]  sodium chloride 0.9% flush 2 mL     Current Outpatient Medications on File Prior to Encounter   Medication Sig    amiodarone (PACERONE) 200 MG Tab Take 1 tablet (200 mg total) by mouth 2 (two) times daily.    apixaban (ELIQUIS) 2.5 mg Tab Take 1 tablet (2.5 mg total) by mouth 2 (two) times daily.    empagliflozin (JARDIANCE) 10 mg tablet Take 1 tablet (10 mg total) by mouth once daily. (Patient not taking: Reported on 11/1/2024)    folic acid (FOLVITE) 1 MG tablet Take 1 tablet (1 mg total) by mouth once daily.    furosemide (LASIX) 20 MG tablet Take 1 tablet (20 mg total) by mouth daily as needed (for lower extremity edema and shortness of breath or >3 lbs weight gain in 24 hours).    gabapentin (NEURONTIN) 100 MG capsule Take 1 capsule (100 mg total) by mouth 2 (two) times daily.    HYDROcodone-acetaminophen (NORCO) 5-325 mg per tablet Take 1 tablet by mouth every 6 (six) hours as needed for Pain. (Patient not taking: Reported on 11/1/2024)    LORazepam (ATIVAN) 1 MG tablet Take 1 tablet (1 mg total) by mouth every 6 (six) hours as needed for Anxiety (insomnia). (Patient not taking: Reported on 11/1/2024)    magnesium oxide (MAG-OX) 400 mg (241.3 mg magnesium) tablet Take 1 tablet (400 mg total) by mouth once daily. Patient requested refill    metoprolol succinate (TOPROL-XL) 25 MG 24 hr tablet Take 1 tablet (25 mg total) by mouth once daily.    pantoprazole (PROTONIX) 40 MG tablet Take 1 tablet (40 mg total) by mouth once daily. (Patient taking differently: Take 40 mg by mouth daily as needed (Heartburn).)    spironolactone (ALDACTONE) 25 MG tablet Take 0.5 tablets (12.5 mg total) by mouth once daily.    sucralfate (CARAFATE) 100 mg/mL suspension Take 10 mLs (1 g total) by mouth 4 (four) times daily.    VIT A/VIT C/VIT E/ZINC/COPPER (ICAPS AREDS ORAL) Take 1 capsule by mouth 2 (two) times a day.      Family History       Problem Relation (Age of Onset)    Breast cancer Maternal Grandmother, Paternal Grandmother     Cancer Mother (57), Father (56)    Colon polyps Daughter    Melanoma Brother    Skin cancer Sister, Brother, Brother          Tobacco Use    Smoking status: Former     Current packs/day: 0.00     Average packs/day: 1 pack/day for 20.0 years (20.0 ttl pk-yrs)     Types: Cigarettes     Start date:      Quit date:      Years since quittin.9    Smokeless tobacco: Never    Tobacco comments:     quit 40 yrs ago   Substance and Sexual Activity    Alcohol use: Yes     Alcohol/week: 1.0 standard drink of alcohol     Types: 1 Glasses of wine per week     Comment: occasional    Drug use: No    Sexual activity: Not Currently     Partners: Male     Review of Systems   Constitutional:  Negative for activity change, appetite change, chills and fever.   HENT:  Negative for congestion, ear pain, nosebleeds and sinus pain.    Eyes:  Negative for discharge and itching.   Respiratory:  Positive for shortness of breath. Negative for apnea, cough and chest tightness.    Cardiovascular:  Negative for chest pain, palpitations and leg swelling.   Gastrointestinal:  Negative for abdominal distention, abdominal pain and vomiting.   Genitourinary:  Negative for difficulty urinating, dysuria, flank pain and frequency.   Musculoskeletal:  Negative for arthralgias, back pain, joint swelling and myalgias.   Skin:  Negative for color change, pallor and rash.   Neurological:  Positive for weakness. Negative for dizziness, light-headedness and headaches.   Psychiatric/Behavioral:  Negative for agitation, behavioral problems, confusion and suicidal ideas.      Objective:     Vital Signs (Most Recent):  Temp: 97.7 °F (36.5 °C) (24 0747)  Pulse: 81 (24 1103)  Resp: 14 (24 0952)  BP: (!) 152/75 (24 0747)  SpO2: (!) 91 % (24 1103) Vital Signs (24h Range):  Pulse:  [67-84] 81  Resp:  [14-22] 14  SpO2:  [88 %-95 %] 91 %     Weight: 49.5 kg (109 lb 2 oz)  Body mass index is 18.73 kg/m².     Physical Exam  Vitals  reviewed.   Constitutional:       General: She is not in acute distress.     Appearance: She is normal weight. She is ill-appearing.   HENT:      Head: Normocephalic and atraumatic.      Nose: Nose normal.      Mouth/Throat:      Mouth: Mucous membranes are moist.      Pharynx: Oropharynx is clear.   Eyes:      General: No scleral icterus.     Extraocular Movements: Extraocular movements intact.      Conjunctiva/sclera: Conjunctivae normal.      Pupils: Pupils are equal, round, and reactive to light.   Cardiovascular:      Rate and Rhythm: Normal rate and regular rhythm.      Pulses: Normal pulses.      Heart sounds: Normal heart sounds. No murmur heard.     No gallop.   Pulmonary:      Effort: Pulmonary effort is normal. No respiratory distress.      Breath sounds: Rhonchi present. No wheezing or rales.      Comments: Dyspnea  Chest:      Chest wall: No tenderness.   Abdominal:      General: Abdomen is flat. There is no distension.      Palpations: Abdomen is soft.      Tenderness: There is no abdominal tenderness.   Musculoskeletal:         General: No swelling or tenderness.      Cervical back: Normal range of motion and neck supple. No rigidity or tenderness.      Right lower leg: No edema.      Left lower leg: No edema.   Skin:     General: Skin is warm and dry.   Neurological:      Mental Status: She is alert. She is disoriented.      Motor: Weakness present.   Psychiatric:         Mood and Affect: Mood normal.         Behavior: Behavior normal.         Thought Content: Thought content normal.              CRANIAL NERVES     CN III, IV, VI   Pupils are equal, round, and reactive to light.       Significant Labs: All pertinent labs within the past 24 hours have been reviewed.    Significant Imaging: I have reviewed all pertinent imaging results/findings within the past 24 hours.  Assessment/Plan:     Comfort measures only status  Will continue IV morphine and ativan infusions for comfort. Plan is to start to  wean off high flow oxygen today. Discussed plan with family.      VTE Risk Mitigation (From admission, onward)           Ordered     IP VTE HIGH RISK PATIENT  Once         11/18/24 1754     Place sequential compression device  Until discontinued         11/18/24 1754                                    Idalia Strong MD, MD  Department of Hospital Medicine  [facility]

## 2024-11-19 NOTE — ASSESSMENT & PLAN NOTE
Will continue IV morphine and ativan infusions for comfort. Plan is to start to wean off high flow oxygen today. Discussed plan with family.

## 2024-11-19 NOTE — CARE UPDATE
11/19/24 1400   Patient Assessment/Suction   Level of Consciousness (AVPU) unresponsive   PRE-TX-O2   Device (Oxygen Therapy) nasal cannula   Flow (L/min) (Oxygen Therapy) 2   SpO2 (!) 41 %     Pt taken off vapo and NRB at this time and placed on 2lnc per MD and family requst.

## 2024-11-19 NOTE — PLAN OF CARE
[facility]  Initial Discharge Assessment       Primary Care Provider: Idalia Greco MD    Admission Diagnosis: End of life care [Z51.5]    Admission Date: 11/18/2024  Expected Discharge Date:        Pt is GIP with [facility].  Payor: [employer] MANAGED MEDICARE / Plan: [employer] MEDICARE HMO / Product Type: Capitation /     Extended Emergency Contact Information  Primary Emergency Contact: Katerine Montemayor  Address: [address] [place] [address] [place]  Mobile Phone: [phone]  Relation: Daughter  Secondary Emergency Contact: Porter Otero  Address: [address]           [place], [place] [id number] [place] [address]  Home Phone: [phone]  Mobile Phone: [phone]  Relation: Spouse  Preferred language: English   needed? No    Discharge Plan A: Inpatient Hospice  Discharge Plan B: Inpatient Hospice      [facility]  [address]  [facility] [address]  Phone: [phone] Fax: [phone]    [facility] - [facility] [address] [facility]  [address] [facility] [address]  Phone: [phone] Fax: [phone]      Initial Assessment (most recent)       Adult Discharge Assessment - 11/19/24 1315          Discharge Assessment    Assessment Type Discharge Planning Assessment     Confirmed/corrected address, phone number and insurance Yes     Confirmed Demographics Correct on Facesheet     Source of Information family     Communicated ALYCIA with patient/caregiver Date not available/Unable to determine     Discharge Plan A Inpatient Hospice     Discharge Plan B Inpatient Hospice

## 2024-11-19 NOTE — CARE UPDATE
Notified by nursing that patient had . Patient examined. No heart tones, no respirations, no carotid pulse, pupils nonreactive. Death declared at 14:35 on 2024. Condolences given to family who were present.

## 2024-11-19 NOTE — HPI
Principal Problem:Acute on chronic hypoxic respiratory failure           HPI:  84-year-old female with a past medical history of lung cancer actively getting chemo last of which was 5 days ago, who presented to the ER because of generalized weakness.  According to the patient, she woke up today, tried to get out of bed, but felt very weak, and slid beside the bed.  Did not hit her head, and did not lose consciousness.  She however could not get up however, and her  could not assist.  911 was called.  On arrival of EMS, patient was satting 88% on 4 L, and appeared to be short of breath.  She was brought to the ER for evaluation.     In the ER, vitals showed a blood pressure of 145/65, heart rate of 103, respiratory rate of 20, afebrile satting 60% on 4 L.  Immediately patient was placed on non-rebreather.  CBC with white count of 16.8, and macrocytic anemia.  CMP showed hypokalemia of 3.4.  First troponin is elevated at 46.  Point of care Lactic acid was 2.1.  Blood cultures were collected.  EKG showed sinus rhythm.  Chest x-ray showed no significant change of bilateral diffuse mixed interstitial and airspace lung opacities.  CTA chest was done, and it was negative for PE, but showed extensive interstitial and airspace opacities throughout both lungs, having significantly worsened in the left lung compared to prior CT. Unchanged small to moderate sized bilateral pleural effusions.  Cefepime was ordered, and patient will be admitted to Medicine for further care of her severe sepsis.    Ms. Otero has been monitored closely during her hospitalization. She was admitted on 11/1/2024 with acute on chronic hypoxemic resp failure. CTA chest reveals extensive interstitial opacities c/w multifocal pna and/or ARDS. She requires high flow nasal cannula up to 15L now on 13L. Pulm and ID have been consulted. Pulm recommends broad spectrum abx - cefepime, doxy, and vanc initially. MRSA screen negative and vanc d/c'ed. She  has stage IV lung ca and completed Carbo/Pemetrexed last year and is currently on pemetrexed (Alimta) maintenance with last dose 10/28. This can cause an interstitial pneumonitis and pulm has started prednisone. Plan for bronchoscopy on Monday if no improvement and recommended continuing eliquis. BiPAP qHS and naps. Duonebs Q6h. ID has ordered a respiratory infection panel that is negative. She's had abdominal bloating and discomfort for some time and had an outpt CT abd/pelvis with gastric wall thickening that is is nonspecific could be related to gastritis. She has a history of peptic ulcers and PCP started protonix/carafate. KUB on 11/2 with nonobstructive bowel gas pattern and moderate stool burden and pt was treated with laxative suppository. She has chronic macrocytic anemia that appears at baseline. She had a leukocytosis with left shift on admission that has trended down from 16K-->12K-->8K. CRP 36, procal 2.9. Pt had a BM on 11/2, but abd remained distended. She was found to be retaining a significant amount of urine on bladder scan >900 and priest was placed with 1500mL out with resolution of abd distention. Pulm increased steriods on 11/3 to IV solumedrol, she's requiring BiPAP support and was given IV lasix. Macrocytic anemia at baseline on admission but trending down and she will receive 1 unit PRBCs 11/4. She has developed steroid induced hyperglycemia and insulin sliding titrated up to moderate dose for tighter glucose control. Priest d/c'ed on 11/5 and had to be reinserted on 11/06. PT/OT consulted and pt up to chair on 11/5. She desatted with movement and took some time and increased supplemental O2 to recover. Prior to moving to the chair, O2 was weaned down to 10L high flow. 11/6/2024 pulmonology change patient to p.o. prednisone as her oxygen demand had improved.  Patient was being maintained on approximately 5 L of oxygen.  11/11/24 patient was working with physical therapy when she became hypoxic  requiring Vapotherm.  At this time she is currently requiring max dose of Vapotherm.  Bronchoscopy is not suggested by pulmonology due to patient's fragile condition.  Patient was switched back to IV Solu-Medrol q.6 I was per pulmonology.  Long discussion was had with the patient concerning inpatient hospice versus comfort measures, patient and spouse would like comfort measures but not until the patient saw her family.  She was seen and evaluated by pulmonology as well as palliative Medicine she requested to remain her present room until her family was present in the action was made to transfer to comfort care.  Requested less lab work, so daily labs were discontinued.    The patient and her family opted for inpatient hospice. She was admitted to inpatient hospice with comfort measures.

## 2024-11-19 NOTE — NURSING
Per family request Oxygen was turned off. Pt passed at 1435 hrs declared by Dr. Ligia LAWSON. [facility] was notified and RN is en route.     1450 hrs JOSEF notified of death.

## 2024-11-19 NOTE — CARE UPDATE
11/18/24 5759   Patient Assessment/Suction   Level of Consciousness (AVPU)   (pt was resting)   Respiratory Effort Mild   Expansion/Accessory Muscles/Retractions accessory muscle use   PRE-TX-O2   Device (Oxygen Therapy) high flow nasal cannula w/device;partial rebreather mask   $ Is the patient on Low Flow Oxygen? Yes   $ Is the patient on High Flow Oxygen? Yes   $ Noninvasive Daily Charge Noninvasive Daily   Humidification temp set 33   Humidification temp actual 33   Flow (L/min) (Oxygen Therapy) 40   Oxygen Concentration (%) 100   SpO2 (!) 94 %   Pulse Oximetry Type Continuous   $ Pulse Oximetry - Multiple Charge Pulse Oximetry - Multiple   Pulse 67   Resp 18   Positioning Supine;HOB elevated 30 degrees   Positioning   Head of Bed (HOB) Positioning HOB at 30 degrees   Positioning/Transfer Devices pillows;in use   Aerosol Therapy   $ Aerosol Therapy Charges Refused  (pts family refused)   Incentive Spirometer   $ Incentive Spirometer Charges unable to perform   Ready to Wean/Extubation Screen   FIO2<=50 (chart decimal) (!) 1   Education   $ Education Oxygen;15 min

## 2024-11-19 NOTE — SUBJECTIVE & OBJECTIVE
Past Medical History:   Diagnosis Date    Arthritis     Cardiac arrest 10/2023    Cataract     Chronic obstructive pulmonary disease, unspecified COPD type 03/20/2024    Colon polyp     Coronary artery disease 03/20/2024    Diabetes mellitus type II 2012    Encounter for blood transfusion     Extra-ovarian endometrioid adenocarcinoma 2020    GERD (gastroesophageal reflux disease)     History of chronic cough     clear productive    Hyperlipidemia     Hypertension     Macular degeneration     Ovarian cancer     PONV (postoperative nausea and vomiting)     Squamous cell carcinoma 1980's    precancer of cervix       Past Surgical History:   Procedure Laterality Date    AUGMENTATION OF BREAST      BRONCHOSCOPY N/A 12/12/2023    Procedure: BRONCHOSCOPY;  Surgeon: Neva Christian MD;  Location: [facility] ENDO;  Service: ENT;  Laterality: N/A;    BRONCHOSCOPY WITH FLUOROSCOPY Right 05/11/2023    Procedure: BRONCHOSCOPY, WITH FLUOROSCOPY;  Surgeon: Neva Christian MD;  Location: [facility] ENDO;  Service: Pulmonary;  Laterality: Right;    BRONCHOSCOPY WITH FLUOROSCOPY N/A 12/15/2023    Procedure: BRONCHOSCOPY, WITH FLUOROSCOPY;  Surgeon: Neva Christian MD;  Location: [facility] ENDO;  Service: Pulmonary;  Laterality: N/A;    CATARACT EXTRACTION BILATERAL W/ ANTERIOR VITRECTOMY Bilateral     CHOLECYSTECTOMY      COLONOSCOPY      COLONOSCOPY N/A 04/20/2023    Procedure: COLONOSCOPY;  Surgeon: Sabino Stephenson MD;  Location: [facility];  Service: Endoscopy;  Laterality: N/A;    CORONARY STENT PLACEMENT  10/2023    x 3    ESOPHAGOGASTRODUODENOSCOPY N/A 06/20/2018    Procedure: EGD (ESOPHAGOGASTRODUODENOSCOPY);  Surgeon: Sabino Stephenson MD;  Location: [facility] ENDO;  Service: Endoscopy;  Laterality: N/A;    ESOPHAGOGASTRODUODENOSCOPY N/A 03/31/2023    Procedure: EGD (ESOPHAGOGASTRODUODENOSCOPY);  Surgeon: Sabino Stephenson MD;  Location: [facility] ENDO;  Service: Endoscopy;  Laterality: N/A;    ESOPHAGOGASTRODUODENOSCOPY N/A 12/11/2023     Procedure: EGD (ESOPHAGOGASTRODUODENOSCOPY);  Surgeon: Pretty Salgado MD;  Location: [facility] ENDO;  Service: Endoscopy;  Laterality: N/A;    HYSTERECTOMY  1976    partial    INJECTION OF ANESTHETIC AGENT AROUND MEDIAL BRANCH NERVES INNERVATING LUMBAR FACET JOINT Right 05/10/2023    Procedure: Block-nerve-Lateral-branch-lumbar;  Surgeon: Agustin Robles MD;  Location: [facility] OR;  Service: Pain Management;  Laterality: Right;  L5 and s1,s2 LBB    INJECTION OF ANESTHETIC AGENT AROUND MEDIAL BRANCH NERVES INNERVATING LUMBAR FACET JOINT Right 05/30/2023    Procedure: Block-nerve-medial branch-lumbar;  Surgeon: Agustin Robles MD;  Location: [facility] OR;  Service: Pain Management;  Laterality: Right;  L5, s1 ,s2 LBB #2    INJECTION OF ANESTHETIC AGENT AROUND NERVE Right 10/31/2023    Procedure: INTERCOSTAL NERVE BLOCK;  Surgeon: Washington Shankar MD;  Location: [facility] OR;  Service: Cardiothoracic;  Laterality: Right;    INJECTION, SACROILIAC JOINT Right 01/26/2023    Procedure: INJECTION,SACROILIAC JOINT;  Surgeon: Agustin Robles MD;  Location: [facility] OR;  Service: Pain Management;  Laterality: Right;    INSERTION OF TUNNELED CENTRAL VENOUS CATHETER (CVC) WITH SUBCUTANEOUS PORT Right 10/19/2020    Procedure: INSERTION, PORT-A-CATH;  Surgeon: Misti Mcfarland MD;  Location: [facility] OR;  Service: General;  Laterality: Right;    INTRAMEDULLARY RODDING OF TROCHANTER OF FEMUR Right 11/28/2021    Procedure: INSERTION, INTRAMEDULLARY LUC, FEMUR, TROCHANTER/RIGHT TFN DR FAJARDO NOTIFIED REP;  Surgeon: Kp Fajardo MD;  Location: [facility] OR;  Service: Orthopedics;  Laterality: Right;  SKIP    ROBOT-ASSISTED LAPAROSCOPIC LYMPHADENECTOMY USING DA NELLA XI N/A 09/21/2020    Procedure: XI ROBOTIC LYMPHADENECTOMY-pelvic and kell-aortic;  Surgeon: Altagracia Ray MD;  Location: [facility] OR;  Service: OB/GYN;  Laterality: N/A;    ROBOT-ASSISTED LAPAROSCOPIC OMENTECTOMY USING DA NELLA XI N/A 09/21/2020    Procedure: XI ROBOTIC OMENTECTOMY;  Surgeon:  Altagracia Rya MD;  Location: [facility] OR;  Service: OB/GYN;  Laterality: N/A;    ROBOT-ASSISTED LAPAROSCOPIC SALPINGO-OOPHORECTOMY USING DA NELLA XI Bilateral 09/21/2020    Procedure: XI ROBOTIC SALPINGO-OOPHORECTOMY;  Surgeon: Altagracia Ray MD;  Location: [facility] OR;  Service: OB/GYN;  Laterality: Bilateral;    THORACOSCOPIC BIOPSY OF PLEURA Right 10/31/2023    Procedure: VATS, WITH PLEURA BIOPSY;  Surgeon: Washington Shankar MD;  Location: [facility] OR;  Service: Cardiothoracic;  Laterality: Right;  FROZEN SECTION   **KRISTEN-ASSISDT**    THORACOTOMY Right 10/31/2023    Procedure: THORACOTOMY;  Surgeon: Washington Shankar MD;  Location: [facility] OR;  Service: Cardiothoracic;  Laterality: Right;    UPPER GASTROINTESTINAL ENDOSCOPY         Review of patient's allergies indicates:  No Known Allergies    Current Facility-Administered Medications on File Prior to Encounter   Medication    [DISCONTINUED] 0.9%  NaCl infusion (for blood administration)    [DISCONTINUED] acetaminophen tablet 650 mg    [DISCONTINUED] acetaminophen tablet 650 mg    [DISCONTINUED] albuterol-ipratropium 2.5 mg-0.5 mg/3 mL nebulizer solution 3 mL    [DISCONTINUED] aluminum-magnesium hydroxide-simethicone 200-200-20 mg/5 mL suspension 30 mL    [DISCONTINUED] dextrose 50% injection 12.5 g    [DISCONTINUED] dextrose 50% injection 25 g    [DISCONTINUED] glucagon (human recombinant) injection 1 mg    [DISCONTINUED] glucose chewable tablet 16 g    [DISCONTINUED] glucose chewable tablet 24 g    [DISCONTINUED] HYDROcodone-acetaminophen 5-325 mg per tablet 1 tablet    [DISCONTINUED] insulin aspart U-100 pen 0-10 Units    [DISCONTINUED] lactulose 20 gram/30 mL solution Soln 20 g    [DISCONTINUED] LORazepam injection 1 mg    [DISCONTINUED] LORazepam tablet 0.5 mg    [DISCONTINUED] magnesium oxide tablet 800 mg    [DISCONTINUED] magnesium oxide tablet 800 mg    [DISCONTINUED] melatonin tablet 6 mg    [DISCONTINUED] morphine injection 2 mg    [DISCONTINUED]  morphine injection 2 mg    [DISCONTINUED] naloxone 0.4 mg/mL injection 0.02 mg    [DISCONTINUED] ondansetron injection 4 mg    [DISCONTINUED] potassium bicarbonate disintegrating tablet 35 mEq    [DISCONTINUED] potassium bicarbonate disintegrating tablet 50 mEq    [DISCONTINUED] potassium bicarbonate disintegrating tablet 60 mEq    [DISCONTINUED] potassium, sodium phosphates 280-160-250 mg packet 2 packet    [DISCONTINUED] potassium, sodium phosphates 280-160-250 mg packet 2 packet    [DISCONTINUED] potassium, sodium phosphates 280-160-250 mg packet 2 packet    [DISCONTINUED] scopolamine 1.3-1.5 mg (1 mg over 3 days) 1 patch    [DISCONTINUED] sodium chloride 0.9% flush 2 mL     Current Outpatient Medications on File Prior to Encounter   Medication Sig    amiodarone (PACERONE) 200 MG Tab Take 1 tablet (200 mg total) by mouth 2 (two) times daily.    apixaban (ELIQUIS) 2.5 mg Tab Take 1 tablet (2.5 mg total) by mouth 2 (two) times daily.    empagliflozin (JARDIANCE) 10 mg tablet Take 1 tablet (10 mg total) by mouth once daily. (Patient not taking: Reported on 11/1/2024)    folic acid (FOLVITE) 1 MG tablet Take 1 tablet (1 mg total) by mouth once daily.    furosemide (LASIX) 20 MG tablet Take 1 tablet (20 mg total) by mouth daily as needed (for lower extremity edema and shortness of breath or >3 lbs weight gain in 24 hours).    gabapentin (NEURONTIN) 100 MG capsule Take 1 capsule (100 mg total) by mouth 2 (two) times daily.    HYDROcodone-acetaminophen (NORCO) 5-325 mg per tablet Take 1 tablet by mouth every 6 (six) hours as needed for Pain. (Patient not taking: Reported on 11/1/2024)    LORazepam (ATIVAN) 1 MG tablet Take 1 tablet (1 mg total) by mouth every 6 (six) hours as needed for Anxiety (insomnia). (Patient not taking: Reported on 11/1/2024)    magnesium oxide (MAG-OX) 400 mg (241.3 mg magnesium) tablet Take 1 tablet (400 mg total) by mouth once daily. Patient requested refill    metoprolol succinate (TOPROL-XL)  25 MG 24 hr tablet Take 1 tablet (25 mg total) by mouth once daily.    pantoprazole (PROTONIX) 40 MG tablet Take 1 tablet (40 mg total) by mouth once daily. (Patient taking differently: Take 40 mg by mouth daily as needed (Heartburn).)    spironolactone (ALDACTONE) 25 MG tablet Take 0.5 tablets (12.5 mg total) by mouth once daily.    sucralfate (CARAFATE) 100 mg/mL suspension Take 10 mLs (1 g total) by mouth 4 (four) times daily.    VIT A/VIT C/VIT E/ZINC/COPPER (ICAPS AREDS ORAL) Take 1 capsule by mouth 2 (two) times a day.      Family History       Problem Relation (Age of Onset)    Breast cancer Maternal Grandmother, Paternal Grandmother    Cancer Mother (57), Father (56)    Colon polyps Daughter    Melanoma Brother    Skin cancer Sister, Brother, Brother          Tobacco Use    Smoking status: Former     Current packs/day: 0.00     Average packs/day: 1 pack/day for 20.0 years (20.0 ttl pk-yrs)     Types: Cigarettes     Start date:      Quit date:      Years since quittin.9    Smokeless tobacco: Never    Tobacco comments:     quit 40 yrs ago   Substance and Sexual Activity    Alcohol use: Yes     Alcohol/week: 1.0 standard drink of alcohol     Types: 1 Glasses of wine per week     Comment: occasional    Drug use: No    Sexual activity: Not Currently     Partners: Male     Review of Systems   Constitutional:  Negative for activity change, appetite change, chills and fever.   HENT:  Negative for congestion, ear pain, nosebleeds and sinus pain.    Eyes:  Negative for discharge and itching.   Respiratory:  Positive for shortness of breath. Negative for apnea, cough and chest tightness.    Cardiovascular:  Negative for chest pain, palpitations and leg swelling.   Gastrointestinal:  Negative for abdominal distention, abdominal pain and vomiting.   Genitourinary:  Negative for difficulty urinating, dysuria, flank pain and frequency.   Musculoskeletal:  Negative for arthralgias, back pain, joint swelling and  myalgias.   Skin:  Negative for color change, pallor and rash.   Neurological:  Positive for weakness. Negative for dizziness, light-headedness and headaches.   Psychiatric/Behavioral:  Negative for agitation, behavioral problems, confusion and suicidal ideas.      Objective:     Vital Signs (Most Recent):  Temp: 97.7 °F (36.5 °C) (11/18/24 0747)  Pulse: 81 (11/19/24 1103)  Resp: 14 (11/19/24 0952)  BP: (!) 152/75 (11/18/24 0747)  SpO2: (!) 91 % (11/19/24 1103) Vital Signs (24h Range):  Pulse:  [67-84] 81  Resp:  [14-22] 14  SpO2:  [88 %-95 %] 91 %     Weight: 49.5 kg (109 lb 2 oz)  Body mass index is 18.73 kg/m².     Physical Exam  Vitals reviewed.   Constitutional:       General: She is not in acute distress.     Appearance: She is normal weight. She is ill-appearing.   HENT:      Head: Normocephalic and atraumatic.      Nose: Nose normal.      Mouth/Throat:      Mouth: Mucous membranes are moist.      Pharynx: Oropharynx is clear.   Eyes:      General: No scleral icterus.     Extraocular Movements: Extraocular movements intact.      Conjunctiva/sclera: Conjunctivae normal.      Pupils: Pupils are equal, round, and reactive to light.   Cardiovascular:      Rate and Rhythm: Normal rate and regular rhythm.      Pulses: Normal pulses.      Heart sounds: Normal heart sounds. No murmur heard.     No gallop.   Pulmonary:      Effort: Pulmonary effort is normal. No respiratory distress.      Breath sounds: Rhonchi present. No wheezing or rales.      Comments: Dyspnea  Chest:      Chest wall: No tenderness.   Abdominal:      General: Abdomen is flat. There is no distension.      Palpations: Abdomen is soft.      Tenderness: There is no abdominal tenderness.   Musculoskeletal:         General: No swelling or tenderness.      Cervical back: Normal range of motion and neck supple. No rigidity or tenderness.      Right lower leg: No edema.      Left lower leg: No edema.   Skin:     General: Skin is warm and dry.    Neurological:      Mental Status: She is alert. She is disoriented.      Motor: Weakness present.   Psychiatric:         Mood and Affect: Mood normal.         Behavior: Behavior normal.         Thought Content: Thought content normal.              CRANIAL NERVES     CN III, IV, VI   Pupils are equal, round, and reactive to light.       Significant Labs: All pertinent labs within the past 24 hours have been reviewed.    Significant Imaging: I have reviewed all pertinent imaging results/findings within the past 24 hours.

## 2024-11-19 NOTE — DISCHARGE SUMMARY
[facility] Medicine  Discharge Summary      Patient Name: Alexa Otero  MRN: [id number]  [facility]: [id number]  Patient Class: IP- Hospice  Admission Date: 11/18/2024  Hospital Length of Stay: 1 days  Discharge Date and Time:  11/19/2024 3:02 PM  Attending Physician: Idalia Strong MD   Discharging Provider: Idalia Strong MD, MD  Primary Care Provider: Idalia Greco MD    Primary Care Team: Networked reference to record PCT     HPI:   Principal Problem:Acute on chronic hypoxic respiratory failure           HPI:  84-year-old female with a past medical history of lung cancer actively getting chemo last of which was 5 days ago, who presented to the ER because of generalized weakness.  According to the patient, she woke up today, tried to get out of bed, but felt very weak, and slid beside the bed.  Did not hit her head, and did not lose consciousness.  She however could not get up however, and her  could not assist.  911 was called.  On arrival of EMS, patient was satting 88% on 4 L, and appeared to be short of breath.  She was brought to the ER for evaluation.     In the ER, vitals showed a blood pressure of 145/65, heart rate of 103, respiratory rate of 20, afebrile satting 60% on 4 L.  Immediately patient was placed on non-rebreather.  CBC with white count of 16.8, and macrocytic anemia.  CMP showed hypokalemia of 3.4.  First troponin is elevated at 46.  Point of care Lactic acid was 2.1.  Blood cultures were collected.  EKG showed sinus rhythm.  Chest x-ray showed no significant change of bilateral diffuse mixed interstitial and airspace lung opacities.  CTA chest was done, and it was negative for PE, but showed extensive interstitial and airspace opacities throughout both lungs, having significantly worsened in the left lung compared to prior CT. Unchanged small to moderate sized bilateral pleural effusions.  Cefepime was ordered, and patient will be admitted to Medicine for  further care of her severe sepsis.    Ms. Otero has been monitored closely during her hospitalization. She was admitted on 11/1/2024 with acute on chronic hypoxemic resp failure. CTA chest reveals extensive interstitial opacities c/w multifocal pna and/or ARDS. She requires high flow nasal cannula up to 15L now on 13L. Pulm and ID have been consulted. Pulm recommends broad spectrum abx - cefepime, doxy, and vanc initially. MRSA screen negative and vanc d/c'ed. She has stage IV lung ca and completed Carbo/Pemetrexed last year and is currently on pemetrexed (Alimta) maintenance with last dose 10/28. This can cause an interstitial pneumonitis and pulm has started prednisone. Plan for bronchoscopy on Monday if no improvement and recommended continuing eliquis. BiPAP qHS and naps. Duonebs Q6h. ID has ordered a respiratory infection panel that is negative. She's had abdominal bloating and discomfort for some time and had an outpt CT abd/pelvis with gastric wall thickening that is is nonspecific could be related to gastritis. She has a history of peptic ulcers and PCP started protonix/carafate. KUB on 11/2 with nonobstructive bowel gas pattern and moderate stool burden and pt was treated with laxative suppository. She has chronic macrocytic anemia that appears at baseline. She had a leukocytosis with left shift on admission that has trended down from 16K-->12K-->8K. CRP 36, procal 2.9. Pt had a BM on 11/2, but abd remained distended. She was found to be retaining a significant amount of urine on bladder scan >900 and priest was placed with 1500mL out with resolution of abd distention. Pulm increased steriods on 11/3 to IV solumedrol, she's requiring BiPAP support and was given IV lasix. Macrocytic anemia at baseline on admission but trending down and she will receive 1 unit PRBCs 11/4. She has developed steroid induced hyperglycemia and insulin sliding titrated up to moderate dose for tighter glucose control. Priest d/c'ed  on  and had to be reinserted on . PT/OT consulted and pt up to chair on . She desatted with movement and took some time and increased supplemental O2 to recover. Prior to moving to the chair, O2 was weaned down to 10L high flow. 2024 pulmonology change patient to p.o. prednisone as her oxygen demand had improved.  Patient was being maintained on approximately 5 L of oxygen.  24 patient was working with physical therapy when she became hypoxic requiring Vapotherm.  At this time she is currently requiring max dose of Vapotherm.  Bronchoscopy is not suggested by pulmonology due to patient's fragile condition.  Patient was switched back to IV Solu-Medrol q.6 I was per pulmonology.  Long discussion was had with the patient concerning inpatient hospice versus comfort measures, patient and spouse would like comfort measures but not until the patient saw her family.  She was seen and evaluated by pulmonology as well as palliative Medicine she requested to remain her present room until her family was present in the action was made to transfer to comfort care.  Requested less lab work, so daily labs were discontinued.    The patient and her family opted for inpatient hospice. She was admitted to inpatient hospice with comfort measures.        * No surgery found *      Hospital Course:   The patient was admitted to inpatient hospice and placed on comfort care medications and treatments. Doses were increased as needed for comfort. The patient was weaned off high flow oxygen. On  she was noted to have no respiratory effort or pulse. She was declared  at 14:35 on 2024. The family was present and my condolences were given.     Goals of Care Treatment Preferences:  Code Status: DNR    Living Will: Yes     What is most important right now is to focus on comfort and QOL .  Accordingly, we have decided that the best plan to meet the patient's goals includes enrolling in hospice care, pivot to  comfort-focused care.      SDOH Screening:  The patient declined to be screened for utility difficulties, food insecurity, transport difficulties, housing insecurity, and interpersonal safety, so no concerns could be identified this admission.     Consults:   Consults (From admission, onward)          Status Ordering Provider     Inpatient consult to Hematology Oncology  Once        Provider:  Virgil Cassidy MD    Acknowledged GENE HERNÁNDEZ            Other  Comfort measures only status        Final Active Diagnoses:    Diagnosis Date Noted POA    Comfort measures only status [Z51.5] 2024 Not Applicable      Problems Resolved During this Admission:       Discharged Condition:     Disposition:     Follow Up:    Patient Instructions:   No discharge procedures on file.    Significant Diagnostic Studies: N/A    Pending Diagnostic Studies:       None           Medications:  None    Indwelling Lines/Drains at time of discharge:   Lines/Drains/Airways       Central Venous Catheter Line  Duration                  PowerPort A Cath Single Lumen Atrial Right -- days              Drain  Duration                  Urethral Catheter 24 0315 16 Fr. 13 days                    Time spent on the discharge of patient: 15 minutes         Idalia Strong MD, MD  Department of Hospital Medicine  [facility]

## 2024-11-20 NOTE — PROGRESS NOTES
[facility]  Palliative Medicine  Progress Note    Patient Name: Alexa Otero  MRN: [id number]  Admission Date: 11/18/2024  Hospital Length of Stay: 1 days  Code Status: Prior   Attending Provider: No att. providers found  Consulting Provider: Zev Mendes MD  Primary Care Physician: Idalia Greco MD  Principal Problem:<principal problem not specified>    Patient information was obtained from patient, spouse/SO, relative(s), past medical records, and primary team.      Assessment/Plan:     Palliative Care  Comfort measures only status  She had a rough night last night.  I have ordered a morphine and Ativan infusion after discussing with family.  I have also increased the dose of available p.r.n. bolus morphine.    Spent considerable time attending in the emotion of her family.  They were particularly distressed (and reasonably so) over her poor symptom control overnight.    Discussed with primary team and bedside RN via secure chat        I will follow-up with patient. Please contact us if you have any additional questions.    Subjective:     Chief Complaint:   Chief Complaint   Patient presents with    Shortness of Breath       HPI:   84-year-old female with lung cancer being treated for acute on chronic respiratory failure requiring high-flow nasal cannula oxygen.  Unfortunately her condition is not improving and she appears to be dying.  Patient and family have elected for hospice care.    I am assisting with symptom management.    Hospital Course:  No notes on file    Interval History:  See HPI    Past Medical History:   Diagnosis Date    Arthritis     Cardiac arrest 10/2023    Cataract     Chronic obstructive pulmonary disease, unspecified COPD type 03/20/2024    Colon polyp     Coronary artery disease 03/20/2024    Diabetes mellitus type II 2012    Encounter for blood transfusion     Extra-ovarian endometrioid adenocarcinoma 2020    GERD (gastroesophageal reflux disease)     History of  chronic cough     clear productive    Hyperlipidemia     Hypertension     Macular degeneration     Ovarian cancer     PONV (postoperative nausea and vomiting)     Squamous cell carcinoma 1980's    precancer of cervix       Past Surgical History:   Procedure Laterality Date    AUGMENTATION OF BREAST      BRONCHOSCOPY N/A 12/12/2023    Procedure: BRONCHOSCOPY;  Surgeon: Neva Christian MD;  Location: [facility];  Service: ENT;  Laterality: N/A;    BRONCHOSCOPY WITH FLUOROSCOPY Right 05/11/2023    Procedure: BRONCHOSCOPY, WITH FLUOROSCOPY;  Surgeon: Neva Christian MD;  Location: [facility] ENDO;  Service: Pulmonary;  Laterality: Right;    BRONCHOSCOPY WITH FLUOROSCOPY N/A 12/15/2023    Procedure: BRONCHOSCOPY, WITH FLUOROSCOPY;  Surgeon: Neva Christian MD;  Location: [facility] ENDO;  Service: Pulmonary;  Laterality: N/A;    CATARACT EXTRACTION BILATERAL W/ ANTERIOR VITRECTOMY Bilateral     CHOLECYSTECTOMY      COLONOSCOPY      COLONOSCOPY N/A 04/20/2023    Procedure: COLONOSCOPY;  Surgeon: Sabino Stephenson MD;  Location: [facility];  Service: Endoscopy;  Laterality: N/A;    CORONARY STENT PLACEMENT  10/2023    x 3    ESOPHAGOGASTRODUODENOSCOPY N/A 06/20/2018    Procedure: EGD (ESOPHAGOGASTRODUODENOSCOPY);  Surgeon: Sabino Stephenson MD;  Location: [facility];  Service: Endoscopy;  Laterality: N/A;    ESOPHAGOGASTRODUODENOSCOPY N/A 03/31/2023    Procedure: EGD (ESOPHAGOGASTRODUODENOSCOPY);  Surgeon: Sabino Stephenson MD;  Location: [facility];  Service: Endoscopy;  Laterality: N/A;    ESOPHAGOGASTRODUODENOSCOPY N/A 12/11/2023    Procedure: EGD (ESOPHAGOGASTRODUODENOSCOPY);  Surgeon: Pretty Salgado MD;  Location: [facility];  Service: Endoscopy;  Laterality: N/A;    HYSTERECTOMY  1976    partial    INJECTION OF ANESTHETIC AGENT AROUND MEDIAL BRANCH NERVES INNERVATING LUMBAR FACET JOINT Right 05/10/2023    Procedure: Block-nerve-Lateral-branch-lumbar;  Surgeon: Agustin Robles MD;  Location: [facility];  Service: Pain Management;   Laterality: Right;  L5 and s1,s2 LBB    INJECTION OF ANESTHETIC AGENT AROUND MEDIAL BRANCH NERVES INNERVATING LUMBAR FACET JOINT Right 05/30/2023    Procedure: Block-nerve-medial branch-lumbar;  Surgeon: Agustin Robles MD;  Location: [facility] OR;  Service: Pain Management;  Laterality: Right;  L5, s1 ,s2 LBB #2    INJECTION OF ANESTHETIC AGENT AROUND NERVE Right 10/31/2023    Procedure: INTERCOSTAL NERVE BLOCK;  Surgeon: Washington Shankar MD;  Location: [facility] OR;  Service: Cardiothoracic;  Laterality: Right;    INJECTION, SACROILIAC JOINT Right 01/26/2023    Procedure: INJECTION,SACROILIAC JOINT;  Surgeon: Agustin Robles MD;  Location: [facility] OR;  Service: Pain Management;  Laterality: Right;    INSERTION OF TUNNELED CENTRAL VENOUS CATHETER (CVC) WITH SUBCUTANEOUS PORT Right 10/19/2020    Procedure: INSERTION, PORT-A-CATH;  Surgeon: Misti Mcfarland MD;  Location: [facility] OR;  Service: General;  Laterality: Right;    INTRAMEDULLARY RODDING OF TROCHANTER OF FEMUR Right 11/28/2021    Procedure: INSERTION, INTRAMEDULLARY LUC, FEMUR, TROCHANTER/RIGHT TFN DR FAJARDO NOTIFIED REP;  Surgeon: Kp Fajardo MD;  Location: [facility] OR;  Service: Orthopedics;  Laterality: Right;  SKIP    ROBOT-ASSISTED LAPAROSCOPIC LYMPHADENECTOMY USING DA NELLA XI N/A 09/21/2020    Procedure: XI ROBOTIC LYMPHADENECTOMY-pelvic and kell-aortic;  Surgeon: Altagracia Ray MD;  Location: [facility] OR;  Service: OB/GYN;  Laterality: N/A;    ROBOT-ASSISTED LAPAROSCOPIC OMENTECTOMY USING DA NELLA XI N/A 09/21/2020    Procedure: XI ROBOTIC OMENTECTOMY;  Surgeon: Altagracia Ray MD;  Location: [facility] OR;  Service: OB/GYN;  Laterality: N/A;    ROBOT-ASSISTED LAPAROSCOPIC SALPINGO-OOPHORECTOMY USING DA NELLA XI Bilateral 09/21/2020    Procedure: XI ROBOTIC SALPINGO-OOPHORECTOMY;  Surgeon: Altagracia Ray MD;  Location: [facility] OR;  Service: OB/GYN;  Laterality: Bilateral;    THORACOSCOPIC BIOPSY OF PLEURA Right 10/31/2023    Procedure: VATS, WITH PLEURA BIOPSY;   Surgeon: Washington Shankar MD;  Location: [facility];  Service: Cardiothoracic;  Laterality: Right;  FROZEN SECTION   **KRISTEN-ASSISDT**    THORACOTOMY Right 10/31/2023    Procedure: THORACOTOMY;  Surgeon: Washington Shankar MD;  Location: [facility];  Service: Cardiothoracic;  Laterality: Right;    UPPER GASTROINTESTINAL ENDOSCOPY         Review of patient's allergies indicates:  No Known Allergies    Medications:  Continuous Infusions:  Scheduled Meds:  PRN Meds:    Family History       Problem Relation (Age of Onset)    Breast cancer Maternal Grandmother, Paternal Grandmother    Cancer Mother (57), Father (56)    Colon polyps Daughter    Melanoma Brother    Skin cancer Sister, Brother, Brother          Tobacco Use    Smoking status: Former     Current packs/day: 0.00     Average packs/day: 1 pack/day for 20.0 years (20.0 ttl pk-yrs)     Types: Cigarettes     Start date:      Quit date:      Years since quittin.9    Smokeless tobacco: Never    Tobacco comments:     quit 40 yrs ago   Substance and Sexual Activity    Alcohol use: Yes     Alcohol/week: 1.0 standard drink of alcohol     Types: 1 Glasses of wine per week     Comment: occasional    Drug use: No    Sexual activity: Not Currently     Partners: Male       Review of Systems  Objective:     Vital Signs (Most Recent):  Temp: 97.7 °F (36.5 °C) (24 0747)  Pulse: 81 (24 1103)  Resp: 10 (24 1405)  BP: (!) 152/75 (24 0747)  SpO2: (!) 41 % (24 1400) Vital Signs (24h Range):  Pulse:  [81] 81  Resp:  [10-16] 10  SpO2:  [41 %-91 %] 41 %     Weight: 49.5 kg (109 lb 2 oz)  Body mass index is 18.73 kg/m².       Physical Exam  Vitals reviewed.   Constitutional:       General: She is not in acute distress.     Appearance: She is ill-appearing.   HENT:      Head: Normocephalic and atraumatic.      Right Ear: External ear normal.      Left Ear: External ear normal.      Nose: Nose normal.      Mouth/Throat:      Mouth: Mucous membranes  "are dry.   Eyes:      General:         Right eye: No discharge.   Cardiovascular:      Rate and Rhythm: Tachycardia present.   Pulmonary:      Effort: Pulmonary effort is normal.      Comments: Death rattle present  Abdominal:      General: There is no distension.      Palpations: Abdomen is soft.      Tenderness: There is no abdominal tenderness.   Skin:     Comments: No mottling present, extremities are warm   Neurological:      Comments: Minimally responsive and unable to assess   Psychiatric:      Comments: Minimally responsive and unable to assess            Review of Symptoms      Symptom Assessment (ESAS 0-10 Scale)  Unable to complete assessment due to Patient nonverbal         Pain Assessment in Advanced Demential Scale (PAINAD)   Breathing - Independent of vocalization:  0  Negative vocalization:  0  Facial expression:  0  Body language:  0  Consolability:  0  Total:  0    Performance Status:  10    Psychosocial/Cultural:   See Palliative Psychosocial Note: No  **Primary  to Follow**  Palliative Care  Consult: No        Advance Care Planning  Advance Directives:   Living Will: Yes        Copy on chart: Yes    Do Not Resuscitate Status: Yes    Medical Power of : Yes    Goals of Care: What is most important right now is to focus on comfort and QOL . Accordingly, we have decided that the best plan to meet the patient's goals includes enrolling in hospice care.         Significant Labs: BMP: No results for input(s): "GLU", "NA", "K", "CL", "CO2", "BUN", "CREATININE", "CALCIUM", "MG" in the last 48 hours.  CBC: No results for input(s): "WBC", "HGB", "HCT", "PLT" in the last 48 hours.  CBC:   Recent Labs   Lab 11/15/24  0448   WBC 15.42*   HGB 7.6*   HCT 23.9*   *        BMP:  No results for input(s): "GLU", "NA", "K", "CL", "CO2", "BUN", "CREATININE", "CALCIUM", "MG" in the last 24 hours.  LFT:  Lab Results   Component Value Date    AST 12 11/15/2024    ALKPHOS 43 " (L) 11/15/2024    BILITOT 0.3 11/15/2024     Albumin:   Albumin   Date Value Ref Range Status   11/15/2024 2.3 (L) 3.5 - 5.2 g/dL Final     Protein:   Total Protein   Date Value Ref Range Status   11/15/2024 4.5 (L) 6.0 - 8.4 g/dL Final     Lactic acid:   Lab Results   Component Value Date    LACTATE 0.7 11/01/2024    LACTATE 0.7 12/10/2023       Significant Imaging: I have reviewed all pertinent imaging results/findings within the past 24 hours.    > 55 min visit spent in chart review, face to face discussion of goals of care,  symptom assessment, coordination of care and emotional support.      Zev Mendes MD  Palliative Medicine  [facility]

## 2024-11-20 NOTE — DISCHARGE SUMMARY
Critical access hospital Medicine  Discharge Summary      Patient Name: Alexa Otero  MRN: [id number]  LISA: [id number]  Patient Class: IP- Inpatient  Admission Date: 11/1/2024  Hospital Length of Stay: 17 days  Discharge Date and Time: 11/18/2024  5:52 PM  Attending Physician: No att. providers found   Discharging Provider: Sudha Wise NP  Primary Care Provider: Idalia Greco MD    Primary Care Team: Networked reference to record PCT     HPI:   84-year-old female with a past medical history of lung cancer actively getting chemo last of which was 5 days ago, who presented to the ER because of generalized weakness.  According to the patient, she woke up today, tried to get out of bed, but felt very weak, and slid beside the bed.  Did not hit her head, and did not lose consciousness.  She however could not get up however, and her  could not assist.  911 was called.  On arrival of EMS, patient was satting 88% on 4 L, and appeared to be short of breath.  She was brought to the ER for evaluation.    In the ER, vitals showed a blood pressure of 145/65, heart rate of 103, respiratory rate of 20, afebrile satting 60% on 4 L.  Immediately patient was placed on non-rebreather.  CBC with white count of 16.8, and macrocytic anemia.  CMP showed hypokalemia of 3.4.  First troponin is elevated at 46.  Point of care Lactic acid was 2.1.  Blood cultures were collected.  EKG showed sinus rhythm.  Chest x-ray showed no significant change of bilateral diffuse mixed interstitial and airspace lung opacities.  CTA chest was done, and it was negative for PE, but showed extensive interstitial and airspace opacities throughout both lungs, having significantly worsened in the left lung compared to prior CT. Unchanged small to moderate sized bilateral pleural effusions.  Cefepime was ordered, and patient will be admitted to Medicine for further care of her severe sepsis.    * No surgery found *      Hospital Course:    Ms. Otero has been monitored closely during her hospitalization. She was admitted on 11/1/2024 with acute on chronic hypoxemic resp failure. CTA chest reveals extensive interstitial opacities c/w multifocal pna and/or ARDS. She requires high flow nasal cannula up to 15L now on 13L. Pulm and ID have been consulted. Pulm recommends broad spectrum abx - cefepime, doxy, and vanc initially. MRSA screen negative and vanc d/c'ed. She has stage IV lung ca and completed Carbo/Pemetrexed last year and is currently on pemetrexed (Alimta) maintenance with last dose 10/28. This can cause an interstitial pneumonitis and pulm has started prednisone. Plan for bronchoscopy on Monday if no improvement and recommended continuing eliquis. BiPAP qHS and naps. Duonebs Q6h. ID has ordered a respiratory infection panel that is negative. She's had abdominal bloating and discomfort for some time and had an outpt CT abd/pelvis with gastric wall thickening that is is nonspecific could be related to gastritis. She has a history of peptic ulcers and PCP started protonix/carafate. KUB on 11/2 with nonobstructive bowel gas pattern and moderate stool burden and pt was treated with laxative suppository. She has chronic macrocytic anemia that appears at baseline. She had a leukocytosis with left shift on admission that has trended down from 16K-->12K-->8K. CRP 36, procal 2.9. Pt had a BM on 11/2, but abd remained distended. She was found to be retaining a significant amount of urine on bladder scan >900 and priest was placed with 1500mL out with resolution of abd distention. Pulm increased steriods on 11/3 to IV solumedrol, she's requiring BiPAP support and was given IV lasix. Macrocytic anemia at baseline on admission but trending down and she will receive 1 unit PRBCs 11/4. She has developed steroid induced hyperglycemia and insulin sliding titrated up to moderate dose for tighter glucose control. Priest d/c'ed on 11/5 and had to be reinserted on  11/06. PT/OT consulted and pt up to chair on 11/5. She desatted with movement and took some time and increased supplemental O2 to recover. Prior to moving to the chair, O2 was weaned down to 10L high flow. 11/6/2024 pulmonology change patient to p.o. prednisone as her oxygen demand had improved.  Patient was being maintained on approximately 5 L of oxygen.  11/11/24 patient was working with physical therapy when she became hypoxic requiring Vapotherm.  At this time she is currently requiring max dose of Vapotherm.  Bronchoscopy is not suggested by pulmonology due to patient's fragile condition.  Patient was switched back to IV Solu-Medrol q.6 I was per pulmonology.  Long discussion was had with the patient concerning inpatient hospice versus comfort measures, patient and spouse would like comfort measures but not until the patient saw her family.  She was seen and evaluated by pulmonology as well as palliative Medicine she requested to remain her present room until her family was present in the action was made to transfer to comfort care.  Requested less lab work, so daily labs were discontinued. Patient and family wish to transition to patient centered focused care. Hospice consulted. Patient will transition to inpatient hospice.      Goals of Care Treatment Preferences:  Code Status: DNR    Living Will: Yes     What is most important right now is to focus on comfort and QOL .  Accordingly, we have decided that the best plan to meet the patient's goals includes enrolling in hospice care, pivot to comfort-focused care.      SDOH Screening:  The patient declined to be screened for utility difficulties, food insecurity, transport difficulties, housing insecurity, and interpersonal safety, so no concerns could be identified this admission.     Consults:   Consults (From admission, onward)          Status Ordering Provider     Inpatient consult to Palliative Care  Once        Provider:  Zev Mendes MD     Completed LANI GIFFORD     Inpatient consult to Social Work/Case Management  Once        Provider:  (Not yet assigned)    Completed LANI GIFFORD     Inpatient consult to Urology  Once        Provider:  David Jon MD    Completed LANI GIFFORD     Inpatient consult to Pulmonology  Once        Provider:  Reid Garrett MD    Completed CHAPINCITO HERNANDEZ SAutumn     Inpatient consult to Infectious Diseases  Once        Provider:  Byron Galvez MD    Completed CHAPINCITO HERNANDEZ SAutumn            No new Assessment & Plan notes have been filed under this hospital service since the last note was generated.  Service: Hospital Medicine    Final Active Diagnoses:    Diagnosis Date Noted POA    PRINCIPAL PROBLEM:  Acute on chronic hypoxic respiratory failure [J96.21] 11/01/2024 Yes    Comfort measures only status [Z51.5] 11/18/2024 Not Applicable    Goals of care, counseling/discussion [Z71.89] 11/13/2024 Not Applicable    Macrocytic anemia [D53.9] 11/03/2024 Yes    Urinary retention [R33.9] 11/03/2024 Yes    Pneumonitis [J98.4] 11/01/2024 Yes    Pleural effusion [J90] 12/04/2023 Yes    Malignant neoplasm of lower lobe of right lung [C34.31] 12/01/2023 Yes    Atrial fibrillation [I48.91] 11/05/2023 Yes    ACP (advance care planning) [Z71.89] 10/13/2023 Not Applicable      Problems Resolved During this Admission:    Diagnosis Date Noted Date Resolved POA    Severe sepsis [A41.9, R65.20] 11/01/2024 11/04/2024 Yes       Discharged Condition: poor    Disposition: Hospice/Medical Facility    Follow Up:    Patient Instructions:      Ambulatory referral/consult to Outpatient Case Management   Referral Priority: Routine Referral Type: Consultation   Referral Reason: Specialty Services Required   Number of Visits Requested: 1       Significant Diagnostic Studies: N/A    Pending Diagnostic Studies:       Procedure Component Value Units Date/Time    COVID-19 Routine Screening [[id number]] Collected: 11/02/24 1329    Order  Status: Sent Lab Status: No result     Specimen: Nasopharyngeal            Medications:  Reconciled Home Medications:      Medication List      You have not been prescribed any medications.         Indwelling Lines/Drains at time of discharge:   Lines/Drains/Airways       Central Venous Catheter Line  Duration                  PowerPort A Cath Single Lumen Atrial Right -- days              Drain  Duration                  Urethral Catheter 11/06/24 0315 16 Fr. 14 days                    Time spent on the discharge of patient: 60 minutes  Discussed POC with Dr. Mendes- palliative care          Sudha Wise NP  Department of Hospital Medicine  [facility]

## 2024-11-20 NOTE — SUBJECTIVE & OBJECTIVE
Interval History:  See HPI    Past Medical History:   Diagnosis Date    Arthritis     Cardiac arrest 10/2023    Cataract     Chronic obstructive pulmonary disease, unspecified COPD type 03/20/2024    Colon polyp     Coronary artery disease 03/20/2024    Diabetes mellitus type II 2012    Encounter for blood transfusion     Extra-ovarian endometrioid adenocarcinoma 2020    GERD (gastroesophageal reflux disease)     History of chronic cough     clear productive    Hyperlipidemia     Hypertension     Macular degeneration     Ovarian cancer     PONV (postoperative nausea and vomiting)     Squamous cell carcinoma 1980's    precancer of cervix       Past Surgical History:   Procedure Laterality Date    AUGMENTATION OF BREAST      BRONCHOSCOPY N/A 12/12/2023    Procedure: BRONCHOSCOPY;  Surgeon: Neva Christian MD;  Location: [facility];  Service: ENT;  Laterality: N/A;    BRONCHOSCOPY WITH FLUOROSCOPY Right 05/11/2023    Procedure: BRONCHOSCOPY, WITH FLUOROSCOPY;  Surgeon: Neva Christian MD;  Location: [facility] ENDO;  Service: Pulmonary;  Laterality: Right;    BRONCHOSCOPY WITH FLUOROSCOPY N/A 12/15/2023    Procedure: BRONCHOSCOPY, WITH FLUOROSCOPY;  Surgeon: Neva Christian MD;  Location: [facility];  Service: Pulmonary;  Laterality: N/A;    CATARACT EXTRACTION BILATERAL W/ ANTERIOR VITRECTOMY Bilateral     CHOLECYSTECTOMY      COLONOSCOPY      COLONOSCOPY N/A 04/20/2023    Procedure: COLONOSCOPY;  Surgeon: Saibno Stephenson MD;  Location: [facility];  Service: Endoscopy;  Laterality: N/A;    CORONARY STENT PLACEMENT  10/2023    x 3    ESOPHAGOGASTRODUODENOSCOPY N/A 06/20/2018    Procedure: EGD (ESOPHAGOGASTRODUODENOSCOPY);  Surgeon: Sabino Stephenson MD;  Location: [facility];  Service: Endoscopy;  Laterality: N/A;    ESOPHAGOGASTRODUODENOSCOPY N/A 03/31/2023    Procedure: EGD (ESOPHAGOGASTRODUODENOSCOPY);  Surgeon: Sabino Stephenson MD;  Location: [facility] ENDO;  Service: Endoscopy;  Laterality: N/A;     ESOPHAGOGASTRODUODENOSCOPY N/A 12/11/2023    Procedure: EGD (ESOPHAGOGASTRODUODENOSCOPY);  Surgeon: Pretty Salgado MD;  Location: [facility] ENDO;  Service: Endoscopy;  Laterality: N/A;    HYSTERECTOMY  1976    partial    INJECTION OF ANESTHETIC AGENT AROUND MEDIAL BRANCH NERVES INNERVATING LUMBAR FACET JOINT Right 05/10/2023    Procedure: Block-nerve-Lateral-branch-lumbar;  Surgeon: Agustin Robles MD;  Location: [facility] OR;  Service: Pain Management;  Laterality: Right;  L5 and s1,s2 LBB    INJECTION OF ANESTHETIC AGENT AROUND MEDIAL BRANCH NERVES INNERVATING LUMBAR FACET JOINT Right 05/30/2023    Procedure: Block-nerve-medial branch-lumbar;  Surgeon: Agustin Robles MD;  Location: [facility] OR;  Service: Pain Management;  Laterality: Right;  L5, s1 ,s2 LBB #2    INJECTION OF ANESTHETIC AGENT AROUND NERVE Right 10/31/2023    Procedure: INTERCOSTAL NERVE BLOCK;  Surgeon: Washington Shankar MD;  Location: [facility] OR;  Service: Cardiothoracic;  Laterality: Right;    INJECTION, SACROILIAC JOINT Right 01/26/2023    Procedure: INJECTION,SACROILIAC JOINT;  Surgeon: Agustin Robles MD;  Location: [facility] OR;  Service: Pain Management;  Laterality: Right;    INSERTION OF TUNNELED CENTRAL VENOUS CATHETER (CVC) WITH SUBCUTANEOUS PORT Right 10/19/2020    Procedure: INSERTION, PORT-A-CATH;  Surgeon: Misti Mcfarland MD;  Location: [facility] OR;  Service: General;  Laterality: Right;    INTRAMEDULLARY RODDING OF TROCHANTER OF FEMUR Right 11/28/2021    Procedure: INSERTION, INTRAMEDULLARY LUC, FEMUR, TROCHANTER/RIGHT TFN DR FAJARDO NOTIFIED REP;  Surgeon: Kp Fajardo MD;  Location: [facility] OR;  Service: Orthopedics;  Laterality: Right;  SKIP    ROBOT-ASSISTED LAPAROSCOPIC LYMPHADENECTOMY USING DA NELLA XI N/A 09/21/2020    Procedure: XI ROBOTIC LYMPHADENECTOMY-pelvic and kell-aortic;  Surgeon: Altagracia Ray MD;  Location: [facility] OR;  Service: OB/GYN;  Laterality: N/A;    ROBOT-ASSISTED LAPAROSCOPIC OMENTECTOMY USING DA NELLA XI N/A 09/21/2020     Procedure: XI ROBOTIC OMENTECTOMY;  Surgeon: Altagracia Ray MD;  Location: [facility] OR;  Service: OB/GYN;  Laterality: N/A;    ROBOT-ASSISTED LAPAROSCOPIC SALPINGO-OOPHORECTOMY USING DA NELLA XI Bilateral 2020    Procedure: XI ROBOTIC SALPINGO-OOPHORECTOMY;  Surgeon: Altagracia Ray MD;  Location: [facility] OR;  Service: OB/GYN;  Laterality: Bilateral;    THORACOSCOPIC BIOPSY OF PLEURA Right 10/31/2023    Procedure: VATS, WITH PLEURA BIOPSY;  Surgeon: Washington Shankar MD;  Location: [facility];  Service: Cardiothoracic;  Laterality: Right;  FROZEN SECTION   **KRISTEN-ASSISDT**    THORACOTOMY Right 10/31/2023    Procedure: THORACOTOMY;  Surgeon: Washington Shankar MD;  Location: [facility] OR;  Service: Cardiothoracic;  Laterality: Right;    UPPER GASTROINTESTINAL ENDOSCOPY         Review of patient's allergies indicates:  No Known Allergies    Medications:  Continuous Infusions:  Scheduled Meds:  PRN Meds:    Family History       Problem Relation (Age of Onset)    Breast cancer Maternal Grandmother, Paternal Grandmother    Cancer Mother (57), Father (56)    Colon polyps Daughter    Melanoma Brother    Skin cancer Sister, Brother, Brother          Tobacco Use    Smoking status: Former     Current packs/day: 0.00     Average packs/day: 1 pack/day for 20.0 years (20.0 ttl pk-yrs)     Types: Cigarettes     Start date:      Quit date: 1981     Years since quittin.9    Smokeless tobacco: Never    Tobacco comments:     quit 40 yrs ago   Substance and Sexual Activity    Alcohol use: Yes     Alcohol/week: 1.0 standard drink of alcohol     Types: 1 Glasses of wine per week     Comment: occasional    Drug use: No    Sexual activity: Not Currently     Partners: Male       Review of Systems  Objective:     Vital Signs (Most Recent):  Temp: 97.7 °F (36.5 °C) (24 0747)  Pulse: 81 (24 1103)  Resp: 10 (24 1405)  BP: (!) 152/75 (24 0747)  SpO2: (!) 41 % (24 1400) Vital Signs (24h Range):  Pulse:   "[81] 81  Resp:  [10-16] 10  SpO2:  [41 %-91 %] 41 %     Weight: 49.5 kg (109 lb 2 oz)  Body mass index is 18.73 kg/m².       Physical Exam  Vitals reviewed.   Constitutional:       General: She is not in acute distress.     Appearance: She is ill-appearing.   HENT:      Head: Normocephalic and atraumatic.      Right Ear: External ear normal.      Left Ear: External ear normal.      Nose: Nose normal.      Mouth/Throat:      Mouth: Mucous membranes are dry.   Eyes:      General:         Right eye: No discharge.   Cardiovascular:      Rate and Rhythm: Tachycardia present.   Pulmonary:      Effort: Pulmonary effort is normal.      Comments: Death rattle present  Abdominal:      General: There is no distension.      Palpations: Abdomen is soft.      Tenderness: There is no abdominal tenderness.   Skin:     Comments: No mottling present, extremities are warm   Neurological:      Comments: Minimally responsive and unable to assess   Psychiatric:      Comments: Minimally responsive and unable to assess            Review of Symptoms      Symptom Assessment (ESAS 0-10 Scale)  Unable to complete assessment due to Patient nonverbal         Pain Assessment in Advanced Demential Scale (PAINAD)   Breathing - Independent of vocalization:  0  Negative vocalization:  0  Facial expression:  0  Body language:  0  Consolability:  0  Total:  0    Performance Status:  10    Psychosocial/Cultural:   See Palliative Psychosocial Note: No  **Primary  to Follow**  Palliative Care  Consult: No        Advance Care Planning   Advance Directives:   Living Will: Yes        Copy on chart: Yes    Do Not Resuscitate Status: Yes    Medical Power of : Yes    Goals of Care: What is most important right now is to focus on comfort and QOL . Accordingly, we have decided that the best plan to meet the patient's goals includes enrolling in hospice care.         Significant Labs: BMP: No results for input(s): "GLU", "NA", " ""K", "CL", "CO2", "BUN", "CREATININE", "CALCIUM", "MG" in the last 48 hours.  CBC: No results for input(s): "WBC", "HGB", "HCT", "PLT" in the last 48 hours.  CBC:   Recent Labs   Lab 11/15/24  0448   WBC 15.42*   HGB 7.6*   HCT 23.9*   *        BMP:  No results for input(s): "GLU", "NA", "K", "CL", "CO2", "BUN", "CREATININE", "CALCIUM", "MG" in the last 24 hours.  LFT:  Lab Results   Component Value Date    AST 12 11/15/2024    ALKPHOS 43 (L) 11/15/2024    BILITOT 0.3 11/15/2024     Albumin:   Albumin   Date Value Ref Range Status   11/15/2024 2.3 (L) 3.5 - 5.2 g/dL Final     Protein:   Total Protein   Date Value Ref Range Status   11/15/2024 4.5 (L) 6.0 - 8.4 g/dL Final     Lactic acid:   Lab Results   Component Value Date    LACTATE 0.7 11/01/2024    LACTATE 0.7 12/10/2023       Significant Imaging: I have reviewed all pertinent imaging results/findings within the past 24 hours.    "

## 2024-11-20 NOTE — ASSESSMENT & PLAN NOTE
She had a rough night last night.  I have ordered a morphine and Ativan infusion after discussing with family.  I have also increased the dose of available p.r.n. bolus morphine.    Spent considerable time attending in the emotion of her family.  They were particularly distressed (and reasonably so) over her poor symptom control overnight.    Discussed with primary team and bedside RN via secure chat

## 2024-12-13 NOTE — ANESTHESIA PREPROCEDURE EVALUATION
10/27/2023  Alexa Otero is a 83 y.o., female.      Tobacco Use:  The patient  reports that she quit smoking about 42 years ago. Her smoking use included cigarettes. She started smoking about 62 years ago. She has a 20.0 pack-year smoking history. She has never used smokeless tobacco.     Results for orders placed or performed during the hospital encounter of 10/13/23   EKG 12-lead    Collection Time: 10/13/23 10:53 AM    Narrative    Test Reason : R41.82,    Vent. Rate : 104 BPM     Atrial Rate : 104 BPM     P-R Int : 134 ms          QRS Dur : 080 ms      QT Int : 356 ms       P-R-T Axes : 070 045 081 degrees     QTc Int : 468 ms    Sinus tachycardia  Cannot rule out Anterior infarct ,age undetermined  Abnormal ECG  When compared with ECG of 18-MAR-2023 20:22,  ST elevation has replaced ST depression in Anterior leads  Confirmed by Adrian Membreno ([id number]) on 10/19/2023 5:32:02 PM    Referred By: [email or web address]   SELF           Confirmed By:Adrian Membreno             Lab Results   Component Value Date    WBC 8.43 10/27/2023    HGB 10.9 (L) 10/27/2023    HCT 35.8 (L) 10/27/2023    MCV 93 10/27/2023     10/27/2023     BMP  Lab Results   Component Value Date     10/27/2023    K 3.8 10/30/2023     10/27/2023    CO2 31 (H) 10/27/2023    BUN 28 (H) 10/27/2023    CREATININE 1.0 10/27/2023    CALCIUM 9.7 10/27/2023    ANIONGAP 7 (L) 10/27/2023    GLU 99 10/27/2023    GLU 85 10/23/2023     10/16/2023       Results for orders placed during the hospital encounter of 10/13/23    Echo    Interpretation Summary    Left Ventricle: The left ventricle is normal in size. Normal wall thickness. Normal wall motion. There is normal systolic function with a visually estimated ejection fraction of 55 - 60%. Grade I diastolic dysfunction.    Right Ventricle: Normal right ventricular cavity size. Wall  thickness is normal. Right ventricle wall motion  is normal. Systolic function is normal.    Aortic Valve: There is moderate aortic valve sclerosis.  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 There is mild mitral annular calcification present. There is no stenosis. The mean pressure gradient across the mitral valve is 3 mmHg at a heart rate of  bpm. There is mild regurgitation.    Tricuspid Valve: There is mild regurgitation.  No pulmonary hypertension.    IVC/SVC: Normal venous pressure at 3 mmHg.     10/27/23 08:52   Group & Rh A POS   INDIRECT ELIAS NEG   Specimen Outdate 11/10/2023 23:59       Pre-op Assessment    I have reviewed the Patient Summary Reports.     I have reviewed the Nursing Notes. I have reviewed the NPO Status.   I have reviewed the Medications.     Review of Systems  Anesthesia Hx:  No problems with previous Anesthesia  Denies Family Hx of Anesthesia complications.  Personal Hx of Anesthesia complications, Post-Operative Nausea/Vomiting, in the past, but not with recent anesthetics / prophylaxis   Social:  Alcohol Use, Former Smoker    Hematology/Oncology:        Hematology Comments: Plavix Therapy  --  Cancer in past history:  surgery and chemotherapy  Oncology Comments: Extra-ovarian endometrioid adenocarcinoma    Squamous cell carcinoma     EENT/Dental:  EENT/Dental Normal Macular degeneration Eyes: Visual Impairment Has Bilateral and S/P Extraction - Bilateral Catarract    Cardiovascular:   Hypertension, poorly controlled CAD asymptomatic CABG/stent  hyperlipidemia ECG has been reviewed. Patient with recent cardiac arrest secondary to ventricular fibrillation during cardiac stent placement     Patient cleared for surgery by Dr. Julio    Pulmonary:   History of chronic cough    Hx Aspiration pneumonia of right lung   Renal/:   Chronic Renal Disease  Kidney Function/Disease, Chronic Kidney Disease (CKD) , CKD Stage III (GFR 30-59)    Hepatic/GI:   GERD, well  controlled Liver Disease, History of colon polyps    Pancreatic pseudocyst/cyst Esophageal / Stomach Disorders Esophageal Disorder, Kwon's Esophagus  Pancreatic Disease   Musculoskeletal:   Arthritis   Joint Disease:  Arthritis, Osteoarthritis  Spine Disorders: lumbar Chronic Pain    Neurological:   Neuromuscular Disease, Sciatica    Weakness of right lower extremity Osteoarthritis    Endocrine:   Diabetes, type 2    Dermatological:  Skin Normal    Psych:  Psychiatric Normal           Physical Exam  General: Well nourished, Cooperative, Alert and Oriented    Airway:  Mallampati: III   Mouth Opening: Normal  TM Distance: > 6 cm  Tongue: Normal  Neck ROM: Normal ROM    Dental:  Intact, Caps / Implants    Chest/Lungs:  Clear to auscultation, Normal Respiratory Rate    Heart:  Rate: Normal  Rhythm: Regular Rhythm        Anesthesia Plan  Type of Anesthesia, risks & benefits discussed:    Anesthesia Type: Gen ETT  Intra-op Monitoring Plan: Standard ASA Monitors and Art Line  Post Op Pain Control Plan: multimodal analgesia and IV/PO Opioids PRN  Induction:  IV  Airway Plan: Direct and Video, Post-Induction  Informed Consent: Informed consent signed with the Patient and all parties understand the risks and agree with anesthesia plan.  All questions answered. Patient consented to blood products? Yes  ASA Score: 3  Anesthesia Plan Notes:       GETA  No Versed   LTA  Double Lumen Tube   Radial Artery Catheter   Benadryl 6.25 mg iv, Decadron 8 mg, iv Zofran 4 mg iv, Pepcid 20 mg iv, Scopolamine Patch     Ofirmev 1000 mg iv, Precedex IV  Sugammadex     Ready For Surgery From Anesthesia Perspective.     .       English
